# Patient Record
Sex: MALE | Race: OTHER | HISPANIC OR LATINO
[De-identification: names, ages, dates, MRNs, and addresses within clinical notes are randomized per-mention and may not be internally consistent; named-entity substitution may affect disease eponyms.]

---

## 2020-03-18 ENCOUNTER — TRANSCRIPTION ENCOUNTER (OUTPATIENT)
Age: 35
End: 2020-03-18

## 2020-04-07 ENCOUNTER — TRANSCRIPTION ENCOUNTER (OUTPATIENT)
Age: 35
End: 2020-04-07

## 2020-04-09 ENCOUNTER — APPOINTMENT (OUTPATIENT)
Dept: DISASTER EMERGENCY | Facility: CLINIC | Age: 35
End: 2020-04-09

## 2020-04-17 ENCOUNTER — EMERGENCY (EMERGENCY)
Facility: HOSPITAL | Age: 35
LOS: 1 days | Discharge: STILL ADMITTED OR ADMINISTRATIVE | End: 2020-04-17
Attending: EMERGENCY MEDICINE
Payer: COMMERCIAL

## 2020-04-17 VITALS
HEART RATE: 140 BPM | RESPIRATION RATE: 20 BRPM | HEIGHT: 69 IN | SYSTOLIC BLOOD PRESSURE: 116 MMHG | DIASTOLIC BLOOD PRESSURE: 69 MMHG | OXYGEN SATURATION: 100 % | WEIGHT: 212.97 LBS | TEMPERATURE: 99 F

## 2020-04-17 LAB
ABO RH CONFIRMATION: SIGNIFICANT CHANGE UP
ALBUMIN SERPL ELPH-MCNC: 4.3 G/DL — SIGNIFICANT CHANGE UP (ref 3.3–5.2)
ALP SERPL-CCNC: 77 U/L — SIGNIFICANT CHANGE UP (ref 40–120)
ALT FLD-CCNC: 19 U/L — SIGNIFICANT CHANGE UP
ANION GAP SERPL CALC-SCNC: 16 MMOL/L — SIGNIFICANT CHANGE UP (ref 5–17)
APTT BLD: 27.1 SEC — LOW (ref 27.5–36.3)
AST SERPL-CCNC: 9 U/L — SIGNIFICANT CHANGE UP
BASE EXCESS BLDV CALC-SCNC: -2.8 MMOL/L — LOW (ref -2–2)
BASOPHILS # BLD AUTO: 0 K/UL — SIGNIFICANT CHANGE UP (ref 0–0.2)
BASOPHILS NFR BLD AUTO: 0 % — SIGNIFICANT CHANGE UP (ref 0–2)
BILIRUB SERPL-MCNC: 0.5 MG/DL — SIGNIFICANT CHANGE UP (ref 0.4–2)
BLD GP AB SCN SERPL QL: SIGNIFICANT CHANGE UP
BUN SERPL-MCNC: 17 MG/DL — SIGNIFICANT CHANGE UP (ref 8–20)
CA-I SERPL-SCNC: 1.11 MMOL/L — LOW (ref 1.15–1.33)
CALCIUM SERPL-MCNC: 9.3 MG/DL — SIGNIFICANT CHANGE UP (ref 8.6–10.2)
CHLORIDE BLDV-SCNC: 106 MMOL/L — SIGNIFICANT CHANGE UP (ref 98–107)
CHLORIDE SERPL-SCNC: 99 MMOL/L — SIGNIFICANT CHANGE UP (ref 98–107)
CO2 SERPL-SCNC: 21 MMOL/L — LOW (ref 22–29)
CREAT SERPL-MCNC: 0.9 MG/DL — SIGNIFICANT CHANGE UP (ref 0.5–1.3)
D DIMER BLD IA.RAPID-MCNC: <150 NG/ML DDU — SIGNIFICANT CHANGE UP
DIR ANTIGLOB POLYSPECIFIC INTERPRETATION: SIGNIFICANT CHANGE UP
EOSINOPHIL # BLD AUTO: 0.01 K/UL — SIGNIFICANT CHANGE UP (ref 0–0.5)
EOSINOPHIL NFR BLD AUTO: 0.3 % — SIGNIFICANT CHANGE UP (ref 0–6)
ETHANOL SERPL-MCNC: <10 MG/DL — SIGNIFICANT CHANGE UP
FIBRINOGEN PPP-MCNC: 674 MG/DL — HIGH (ref 300–520)
GAS PNL BLDV: 140 MMOL/L — SIGNIFICANT CHANGE UP (ref 135–145)
GAS PNL BLDV: SIGNIFICANT CHANGE UP
GAS PNL BLDV: SIGNIFICANT CHANGE UP
GLUCOSE BLDV-MCNC: 129 MG/DL — HIGH (ref 70–99)
GLUCOSE SERPL-MCNC: 136 MG/DL — HIGH (ref 70–99)
HAPTOGLOB SERPL-MCNC: 386 MG/DL — HIGH (ref 34–200)
HCO3 BLDV-SCNC: 22 MMOL/L — SIGNIFICANT CHANGE UP (ref 21–29)
HCT VFR BLD CALC: 9.9 % — CRITICAL LOW (ref 39–50)
HCT VFR BLDA CALC: 11 — CRITICAL LOW (ref 39–50)
HGB BLD CALC-MCNC: <4.8 G/DL — CRITICAL LOW (ref 13–17)
HGB BLD-MCNC: 3.6 G/DL — CRITICAL LOW (ref 13–17)
IMM GRANULOCYTES NFR BLD AUTO: 0.3 % — SIGNIFICANT CHANGE UP (ref 0–1.5)
INR BLD: 1.19 RATIO — HIGH (ref 0.88–1.16)
LACTATE BLDV-MCNC: 2.9 MMOL/L — HIGH (ref 0.5–2)
LDH SERPL L TO P-CCNC: 100 U/L — SIGNIFICANT CHANGE UP (ref 98–192)
LYMPHOCYTES # BLD AUTO: 2.95 K/UL — SIGNIFICANT CHANGE UP (ref 1–3.3)
LYMPHOCYTES # BLD AUTO: 89.7 % — HIGH (ref 13–44)
MAGNESIUM SERPL-MCNC: 2.3 MG/DL — SIGNIFICANT CHANGE UP (ref 1.6–2.6)
MCHC RBC-ENTMCNC: 32.1 PG — SIGNIFICANT CHANGE UP (ref 27–34)
MCHC RBC-ENTMCNC: 36.4 GM/DL — HIGH (ref 32–36)
MCV RBC AUTO: 88.4 FL — SIGNIFICANT CHANGE UP (ref 80–100)
MONOCYTES # BLD AUTO: 0.08 K/UL — SIGNIFICANT CHANGE UP (ref 0–0.9)
MONOCYTES NFR BLD AUTO: 2.4 % — SIGNIFICANT CHANGE UP (ref 2–14)
NEUTROPHILS # BLD AUTO: 0.24 K/UL — LOW (ref 1.8–7.4)
NEUTROPHILS NFR BLD AUTO: 7.3 % — LOW (ref 43–77)
OB PNL STL: POSITIVE
OTHER CELLS CSF MANUAL: 4 ML/DL — LOW (ref 18–22)
PCO2 BLDV: 36 MMHG — SIGNIFICANT CHANGE UP (ref 35–50)
PH BLDV: 7.39 — SIGNIFICANT CHANGE UP (ref 7.32–7.43)
PLATELET # BLD AUTO: 2 K/UL — CRITICAL LOW (ref 150–400)
PO2 BLDV: 49 MMHG — HIGH (ref 25–45)
POTASSIUM BLDV-SCNC: 3.9 MMOL/L — SIGNIFICANT CHANGE UP (ref 3.4–4.5)
POTASSIUM SERPL-MCNC: 4 MMOL/L — SIGNIFICANT CHANGE UP (ref 3.5–5.3)
POTASSIUM SERPL-SCNC: 4 MMOL/L — SIGNIFICANT CHANGE UP (ref 3.5–5.3)
PROT SERPL-MCNC: 7.4 G/DL — SIGNIFICANT CHANGE UP (ref 6.6–8.7)
PROTHROM AB SERPL-ACNC: 13.5 SEC — HIGH (ref 10–12.9)
RBC # BLD: 1.12 M/UL — LOW (ref 4.2–5.8)
RBC # FLD: 12.9 % — SIGNIFICANT CHANGE UP (ref 10.3–14.5)
SAO2 % BLDV: 84 % — SIGNIFICANT CHANGE UP
SARS-COV-2 RNA SPEC QL NAA+PROBE: SIGNIFICANT CHANGE UP
SODIUM SERPL-SCNC: 136 MMOL/L — SIGNIFICANT CHANGE UP (ref 135–145)
WBC # BLD: 3.29 K/UL — LOW (ref 3.8–10.5)
WBC # FLD AUTO: 3.29 K/UL — LOW (ref 3.8–10.5)

## 2020-04-17 PROCEDURE — 71045 X-RAY EXAM CHEST 1 VIEW: CPT | Mod: 26

## 2020-04-17 PROCEDURE — 99291 CRITICAL CARE FIRST HOUR: CPT

## 2020-04-17 PROCEDURE — 99233 SBSQ HOSP IP/OBS HIGH 50: CPT

## 2020-04-17 PROCEDURE — 93010 ELECTROCARDIOGRAM REPORT: CPT

## 2020-04-17 PROCEDURE — 70450 CT HEAD/BRAIN W/O DYE: CPT | Mod: 26

## 2020-04-17 RX ORDER — VANCOMYCIN HCL 1 G
1000 VIAL (EA) INTRAVENOUS ONCE
Refills: 0 | Status: DISCONTINUED | OUTPATIENT
Start: 2020-04-17 | End: 2020-04-17

## 2020-04-17 RX ORDER — ACETAMINOPHEN 500 MG
650 TABLET ORAL ONCE
Refills: 0 | Status: COMPLETED | OUTPATIENT
Start: 2020-04-17 | End: 2020-04-17

## 2020-04-17 RX ORDER — CEFEPIME 1 G/1
2000 INJECTION, POWDER, FOR SOLUTION INTRAMUSCULAR; INTRAVENOUS ONCE
Refills: 0 | Status: COMPLETED | OUTPATIENT
Start: 2020-04-17 | End: 2020-04-17

## 2020-04-17 RX ADMIN — CEFEPIME 100 MILLIGRAM(S): 1 INJECTION, POWDER, FOR SOLUTION INTRAMUSCULAR; INTRAVENOUS at 19:55

## 2020-04-17 RX ADMIN — Medication 650 MILLIGRAM(S): at 19:55

## 2020-04-17 RX ADMIN — CEFEPIME 2000 MILLIGRAM(S): 1 INJECTION, POWDER, FOR SOLUTION INTRAMUSCULAR; INTRAVENOUS at 20:25

## 2020-04-17 NOTE — ED PROVIDER NOTE - CARE PLAN
Principal Discharge DX:	Anemia  Secondary Diagnosis:	Thrombocytopenia  Secondary Diagnosis:	Leukocytopenia  Secondary Diagnosis:	Gingival bleeding Principal Discharge DX:	Pancytopenia  Secondary Diagnosis:	Gingival bleeding

## 2020-04-17 NOTE — ED ADULT TRIAGE NOTE - CHIEF COMPLAINT QUOTE
Pt with syncopal episode at home, pt reports dizziness x's 10 days, dark stool x's 8 days, fever x's 3 days, bleeding gums x's 7 days, pt pale in color, c/o fatigue and dizziness, MD called to bedside

## 2020-04-17 NOTE — ED PROVIDER NOTE - ATTENDING CONTRIBUTION TO CARE
Agree with resident assessment. Otherwise healthy 34yom w/ pathologic bleeding, labs remarkable for pancytopenia, decreased reticulocyte count, work up so far concerning for acute leukemia, less likely TTP or other hemolytic process. Evaluated by hematology who recommends continued work up at [facility], will need bone marrow biopsy, flow cytometry, etc. Tachycardic but maintaining peripheral perfusion, BP and mental status. Getting 2u PRBC in ED, 1 u platelets. Blood culture sent and empiric antibiotics being given due to neutropenia.

## 2020-04-17 NOTE — ED ADULT NURSE REASSESSMENT NOTE - NS ED NURSE REASSESS COMMENT FT1
blood explained and started, possible reactions explained and pt given call light and instructions to call, RN in room with patient for 10 min and checked again at 15 minutes for vitals, pt stable

## 2020-04-17 NOTE — ED PROVIDER NOTE - NS ED ROS FT
Constitutional: +fever, sweats, and chills, fatigue  Eyes: no pain, no redness, and no visual changes.  ENMT: no ear pain and no hearing problems, no nasal congestion/drainage, no dysphagia, and no throat pain, no neck pain, no stiffness, +bleeding gums  CV: no chest pain, no edema.  Resp: no cough, no dyspnea  GI: no abdominal pain, no bloating, no constipation, no diarrhea, no nausea and no vomiting, +melena  : no dysuria, no hematuria  MSK: no msk pain, no weakness  Skin: no lesions, and no rashes.  Neuro: no LOC, no headache, no sensory deficits, and no weakness.

## 2020-04-17 NOTE — ED ADULT NURSE NOTE - OBJECTIVE STATEMENT
Pt presents with c/o progressively worsening dizziness, tactile fever for the past 8-10 days. + lightheadedness and " not feeling right". skin pale and sl jaundice on exam. denies chest pain or sob.

## 2020-04-17 NOTE — ED ADULT NURSE REASSESSMENT NOTE - NS ED NURSE REASSESS COMMENT FT1
pt doing well, no needs at this time, no signs of transfusion reaction, blood still transfusing, pt aware of wait status

## 2020-04-17 NOTE — ED PROVIDER NOTE - PROGRESS NOTE DETAILS
Willow Campbell, Resident: Pt noted to be anemic, thrombocytopenic, leukocytopenic. pRBCs and platelets ordered. Willow Campbell, Resident: Heme/Onc consulted, spoke to Dr. Schulte who will see the patient. Willow Campbell, Resident: Spoke to Dr. Schulte who recommends transfer to [facility] for further workup by hematology/oncology team including rule out leukemia, MDS etc... Explained reason for transfer to patient and he agrees Brent: Case discussed with Dr. Dent (Heme fellow), [facility] hospitalist, pt to be transferred direct to [address] [place] [address] under hematology service. Will complete 2u PRBC here and then initiate ambulance transfer.

## 2020-04-17 NOTE — ED PROVIDER NOTE - PHYSICAL EXAMINATION
General: FATIGUED, PALLOR OF THE SKIN, NAD  Head:  NC, AT  Eyes: EOMI, PERRLA, no scleral icterus  Ears: no erythema/drainage  Nose: midline, no bleeding/drainage  Throat: MMM  Cardiac: RRR, no m/r/g, no lower extremity edema  Respiratory: CTABL, no wheezes/rales/rhonchi, equal chest wall expansions  Abdomen: soft, ND, NT, no rebound tenderness, no guarding, nonperitonitic  MSK/Vascular: full ROM, distal pulses intact, soft compartments, warm extremities  Neuro: AAOx3, strength 5/5, sensation to light touch intact,  cranial nerves 2-12 intact  Psych: calm, cooperative, normal affect

## 2020-04-17 NOTE — ED PROVIDER NOTE - OBJECTIVE STATEMENT
Pt is a 34 y.o. M PMH asthma presenting with dizziness, dark stools, and fever for 8 days. The pt states he has been progressively more dizzy making it difficult to concentrate. Denies vertigo. +tactile fever, chills. Dark stools, no BRBPR. Denies chest and shortness of breath. +gingival bleeding. Denies abdominal pain, nausea, vomiting, headache.

## 2020-04-17 NOTE — ED ADULT NURSE REASSESSMENT NOTE - NS ED NURSE REASSESS COMMENT FT1
pt updated on plan of care and transfer to [facility] after 2nd unit for higher level of care, questions asked and answered. c/o headache, verbal order for tylenol received.

## 2020-04-17 NOTE — CONSULT NOTE ADULT - SUBJECTIVE AND OBJECTIVE BOX
REASON FOR CONSULTATION: anemia, thrombocytopenia    HPI:  34 year old with pmhx of Asthma who presented with dizziness.   He reports 15 lb weight loss x 3 months, intermittent night sweats. No fevers.  Reports 1 large episode of prolonged epistaxis a few weeks ago.  Denies blood per rectum, melena, hematuria.  Denies any recent infections.  Hgb on admission 3.6, WBC 3, platelets 2000.         REVIEW OF SYSTEMS:  Constitutional, Eyes, ENT, Cardiovascular, Respiratory, Gastrointestinal, Genitourinary, Musculoskeletal, Integumentary, Neurological, Psychiatric, Endocrine, Heme/Lymph, and Allergic/Immunologic review of systems are otherwise negative except as noted in the HPI.    PAST MEDICAL & SURGICAL HISTORY:  Redundant prepuce and phimosis  Asthma, stable  No significant past surgical history      FAMILY HISTORY:  No pertinent family history in first degree relatives      SOCIAL HISTORY:  Denies  smoking.     Allergies    No Known Allergies    Intolerances        MEDICATIONS  (STANDING):  cefepime   IVPB 2000 milliGRAM(s) IV Intermittent once    MEDICATIONS  (PRN):      Vital Signs Last 24 Hrs  T(C): 37.2 (17 Apr 2020 18:47), Max: 37.7 (17 Apr 2020 14:28)  T(F): 98.9 (17 Apr 2020 18:47), Max: 99.8 (17 Apr 2020 14:28)  HR: 128 (17 Apr 2020 18:47) (128 - 140)  BP: 120/69 (17 Apr 2020 18:47) (116/69 - 120/69)  BP(mean): --  RR: 20 (17 Apr 2020 18:47) (20 - 20)  SpO2: 98% (17 Apr 2020 18:47) (98% - 100%)    PHYSICAL EXAM:    GENERAL: pale appearing gentleman, in no acute distress  HEAD:  Atraumatic, Normocephalic  EYES: pale conjunctivae  NECK: Supple, No JVD, Normal thyroid  NERVOUS SYSTEM:  alert awake, slow to respond  CHEST/LUNG: Clear to auscultation bilaterally;   HEART: Regular rate and rhythm;   ABDOMEN: Soft, Nontender, Nondistended;  EXTREMITIES: No edema  LYMPH: No lymphadenopathy noted  SKIN: No rashes       LABS:                        3.6    3.29  )-----------( 2        ( 17 Apr 2020 14:39 )             9.9      04-17    136  |  99  |  17.0  ----------------------------<  136<H>  4.0   |  21.0<L>  |  0.90    Ca    9.3      17 Apr 2020 14:39  Phos  3.0     04-17  Mg     2.3     04-17    TPro  7.4  /  Alb  4.3  /  TBili  0.5  /  DBili  x   /  AST  9   /  ALT  19  /  AlkPhos  77  04-17    PT/INR - ( 17 Apr 2020 14:39 )   PT: 13.5 sec;   INR: 1.19 ratio         PTT - ( 17 Apr 2020 14:39 )  PTT:27.1 sec        RADIOLOGY & ADDITIONAL STUDIES:  CXR - clear

## 2020-04-17 NOTE — CONSULT NOTE ADULT - ASSESSMENT
34 year old gentleman presenting with dizziness, noted to have pancytopenia, severe anemia and severe thrombocytopenia.  Hgb 3.6, platelets 2K, WBC 3, primarily lymphocytosis.  Mildly elevated PT/INR, Normal ddmier  +gingival bleeding, +B symptoms  Peripheral smear with a few large immature cells, essentially no platelets.     Plan:  Transfuse 1 unit platelets and 2 units prbc  R/o acute leukemia, transfer to [facility]  Discussed with ED attending and heme fellow at [facility]

## 2020-04-18 ENCOUNTER — INPATIENT (INPATIENT)
Facility: HOSPITAL | Age: 35
LOS: 31 days | Discharge: HOME | DRG: 810 | End: 2020-05-20
Attending: INTERNAL MEDICINE
Payer: COMMERCIAL

## 2020-04-18 VITALS
SYSTOLIC BLOOD PRESSURE: 145 MMHG | RESPIRATION RATE: 16 BRPM | DIASTOLIC BLOOD PRESSURE: 85 MMHG | TEMPERATURE: 98 F | HEIGHT: 69 IN | WEIGHT: 215.39 LBS | OXYGEN SATURATION: 99 % | HEART RATE: 98 BPM

## 2020-04-18 VITALS
SYSTOLIC BLOOD PRESSURE: 105 MMHG | HEART RATE: 99 BPM | TEMPERATURE: 98 F | DIASTOLIC BLOOD PRESSURE: 55 MMHG | RESPIRATION RATE: 17 BRPM | OXYGEN SATURATION: 100 %

## 2020-04-18 DIAGNOSIS — D69.6 THROMBOCYTOPENIA, UNSPECIFIED: ICD-10-CM

## 2020-04-18 DIAGNOSIS — Z98.890 OTHER SPECIFIED POSTPROCEDURAL STATES: Chronic | ICD-10-CM

## 2020-04-18 DIAGNOSIS — D50.0 IRON DEFICIENCY ANEMIA SECONDARY TO BLOOD LOSS (CHRONIC): ICD-10-CM

## 2020-04-18 DIAGNOSIS — H53.9 UNSPECIFIED VISUAL DISTURBANCE: ICD-10-CM

## 2020-04-18 DIAGNOSIS — C95.90 LEUKEMIA, UNSPECIFIED NOT HAVING ACHIEVED REMISSION: ICD-10-CM

## 2020-04-18 LAB
ALBUMIN SERPL ELPH-MCNC: 3.9 G/DL — SIGNIFICANT CHANGE UP (ref 3.3–5)
ALBUMIN SERPL ELPH-MCNC: 4.3 G/DL — SIGNIFICANT CHANGE UP (ref 3.3–5)
ALP SERPL-CCNC: 62 U/L — SIGNIFICANT CHANGE UP (ref 40–120)
ALP SERPL-CCNC: 75 U/L — SIGNIFICANT CHANGE UP (ref 40–120)
ALT FLD-CCNC: 18 U/L — SIGNIFICANT CHANGE UP (ref 10–45)
ALT FLD-CCNC: 21 U/L — SIGNIFICANT CHANGE UP (ref 10–45)
ANION GAP SERPL CALC-SCNC: 13 MMOL/L — SIGNIFICANT CHANGE UP (ref 5–17)
ANION GAP SERPL CALC-SCNC: 9 MMOL/L — SIGNIFICANT CHANGE UP (ref 5–17)
APPEARANCE UR: CLEAR — SIGNIFICANT CHANGE UP
APTT BLD: 25.9 SEC — LOW (ref 27.5–36.3)
APTT BLD: 27.5 SEC — SIGNIFICANT CHANGE UP (ref 27.5–36.3)
AST SERPL-CCNC: 12 U/L — SIGNIFICANT CHANGE UP (ref 10–40)
AST SERPL-CCNC: 9 U/L — LOW (ref 10–40)
BASE EXCESS BLDV CALC-SCNC: -0.6 MMOL/L — SIGNIFICANT CHANGE UP (ref -2–2)
BASOPHILS # BLD AUTO: 0 K/UL — SIGNIFICANT CHANGE UP (ref 0–0.2)
BASOPHILS NFR BLD AUTO: 0 % — SIGNIFICANT CHANGE UP (ref 0–2)
BILIRUB SERPL-MCNC: 1 MG/DL — SIGNIFICANT CHANGE UP (ref 0.2–1.2)
BILIRUB SERPL-MCNC: 1.1 MG/DL — SIGNIFICANT CHANGE UP (ref 0.2–1.2)
BILIRUB UR-MCNC: NEGATIVE — SIGNIFICANT CHANGE UP
BLD GP AB SCN SERPL QL: NEGATIVE — SIGNIFICANT CHANGE UP
BUN SERPL-MCNC: 15 MG/DL — SIGNIFICANT CHANGE UP (ref 7–23)
BUN SERPL-MCNC: 16 MG/DL — SIGNIFICANT CHANGE UP (ref 7–23)
CA-I SERPL-SCNC: 1.19 MMOL/L — SIGNIFICANT CHANGE UP (ref 1.12–1.3)
CALCIUM SERPL-MCNC: 8.7 MG/DL — SIGNIFICANT CHANGE UP (ref 8.4–10.5)
CALCIUM SERPL-MCNC: 9.2 MG/DL — SIGNIFICANT CHANGE UP (ref 8.4–10.5)
CHLORIDE BLDV-SCNC: 106 MMOL/L — SIGNIFICANT CHANGE UP (ref 96–108)
CHLORIDE SERPL-SCNC: 104 MMOL/L — SIGNIFICANT CHANGE UP (ref 96–108)
CHLORIDE SERPL-SCNC: 104 MMOL/L — SIGNIFICANT CHANGE UP (ref 96–108)
CK MB CFR SERPL CALC: 1 NG/ML — SIGNIFICANT CHANGE UP (ref 0–6.7)
CK SERPL-CCNC: 86 U/L — SIGNIFICANT CHANGE UP (ref 30–200)
CO2 BLDV-SCNC: 25 MMOL/L — SIGNIFICANT CHANGE UP (ref 22–30)
CO2 SERPL-SCNC: 23 MMOL/L — SIGNIFICANT CHANGE UP (ref 22–31)
CO2 SERPL-SCNC: 23 MMOL/L — SIGNIFICANT CHANGE UP (ref 22–31)
COLOR SPEC: YELLOW — SIGNIFICANT CHANGE UP
CREAT SERPL-MCNC: 0.77 MG/DL — SIGNIFICANT CHANGE UP (ref 0.5–1.3)
CREAT SERPL-MCNC: 0.79 MG/DL — SIGNIFICANT CHANGE UP (ref 0.5–1.3)
D DIMER BLD IA.RAPID-MCNC: 193 NG/ML DDU — SIGNIFICANT CHANGE UP
D DIMER BLD IA.RAPID-MCNC: 230 NG/ML DDU — HIGH
DIFF PNL FLD: NEGATIVE — SIGNIFICANT CHANGE UP
EOSINOPHIL # BLD AUTO: 0 K/UL — SIGNIFICANT CHANGE UP (ref 0–0.5)
EOSINOPHIL NFR BLD AUTO: 0 % — SIGNIFICANT CHANGE UP (ref 0–6)
FIBRINOGEN PPP-MCNC: 614 MG/DL — HIGH (ref 350–510)
GAS PNL BLDV: 137 MMOL/L — SIGNIFICANT CHANGE UP (ref 135–145)
GAS PNL BLDV: SIGNIFICANT CHANGE UP
GAS PNL BLDV: SIGNIFICANT CHANGE UP
GLUCOSE BLDV-MCNC: 97 MG/DL — SIGNIFICANT CHANGE UP (ref 70–99)
GLUCOSE SERPL-MCNC: 109 MG/DL — HIGH (ref 70–99)
GLUCOSE SERPL-MCNC: 97 MG/DL — SIGNIFICANT CHANGE UP (ref 70–99)
GLUCOSE UR QL: NEGATIVE MG/DL — SIGNIFICANT CHANGE UP
HAV IGM SER-ACNC: SIGNIFICANT CHANGE UP
HBV CORE AB SER-ACNC: SIGNIFICANT CHANGE UP
HBV CORE IGM SER-ACNC: SIGNIFICANT CHANGE UP
HBV SURFACE AB SER-ACNC: REACTIVE
HBV SURFACE AG SER-ACNC: SIGNIFICANT CHANGE UP
HCO3 BLDV-SCNC: 24 MMOL/L — SIGNIFICANT CHANGE UP (ref 21–29)
HCT VFR BLD CALC: 11.9 % — CRITICAL LOW (ref 39–50)
HCT VFR BLD CALC: 14.8 % — CRITICAL LOW (ref 39–50)
HCT VFR BLDA CALC: 14 % — CRITICAL LOW (ref 39–50)
HCV AB S/CO SERPL IA: 0.09 S/CO — SIGNIFICANT CHANGE UP (ref 0–0.99)
HCV AB SERPL-IMP: SIGNIFICANT CHANGE UP
HGB BLD CALC-MCNC: 4.2 G/DL — CRITICAL LOW (ref 13–17)
HGB BLD-MCNC: 4.2 G/DL — CRITICAL LOW (ref 13–17)
HGB BLD-MCNC: 5.3 G/DL — CRITICAL LOW (ref 13–17)
HIV 1+2 AB+HIV1 P24 AG SERPL QL IA: SIGNIFICANT CHANGE UP
INR BLD: 1.1 RATIO — SIGNIFICANT CHANGE UP (ref 0.88–1.16)
INR BLD: 1.17 RATIO — HIGH (ref 0.88–1.16)
KETONES UR-MCNC: NEGATIVE — SIGNIFICANT CHANGE UP
LACTATE BLDV-MCNC: 0.8 MMOL/L — SIGNIFICANT CHANGE UP (ref 0.7–2)
LDH SERPL L TO P-CCNC: 102 U/L — SIGNIFICANT CHANGE UP (ref 50–242)
LDH SERPL L TO P-CCNC: 109 U/L — SIGNIFICANT CHANGE UP (ref 50–242)
LEUKOCYTE ESTERASE UR-ACNC: NEGATIVE — SIGNIFICANT CHANGE UP
LYMPHOCYTES # BLD AUTO: 1.77 K/UL — SIGNIFICANT CHANGE UP (ref 1–3.3)
LYMPHOCYTES # BLD AUTO: 94.4 % — HIGH (ref 13–44)
MAGNESIUM SERPL-MCNC: 2.3 MG/DL — SIGNIFICANT CHANGE UP (ref 1.6–2.6)
MAGNESIUM SERPL-MCNC: 2.4 MG/DL — SIGNIFICANT CHANGE UP (ref 1.6–2.6)
MANUAL SMEAR VERIFICATION: SIGNIFICANT CHANGE UP
MCHC RBC-ENTMCNC: 30.8 PG — SIGNIFICANT CHANGE UP (ref 27–34)
MCHC RBC-ENTMCNC: 31.1 PG — SIGNIFICANT CHANGE UP (ref 27–34)
MCHC RBC-ENTMCNC: 35.3 GM/DL — SIGNIFICANT CHANGE UP (ref 32–36)
MCHC RBC-ENTMCNC: 35.8 GM/DL — SIGNIFICANT CHANGE UP (ref 32–36)
MCV RBC AUTO: 86 FL — SIGNIFICANT CHANGE UP (ref 80–100)
MCV RBC AUTO: 88.1 FL — SIGNIFICANT CHANGE UP (ref 80–100)
MONOCYTES # BLD AUTO: 0.04 K/UL — SIGNIFICANT CHANGE UP (ref 0–0.9)
MONOCYTES NFR BLD AUTO: 1.9 % — LOW (ref 2–14)
NEUTROPHILS # BLD AUTO: 0.07 K/UL — LOW (ref 1.8–7.4)
NEUTROPHILS NFR BLD AUTO: 3.7 % — LOW (ref 43–77)
NITRITE UR-MCNC: NEGATIVE — SIGNIFICANT CHANGE UP
NRBC # BLD: 0 /100 WBCS — SIGNIFICANT CHANGE UP (ref 0–0)
OTHER CELLS CSF MANUAL: 6 ML/DL — LOW (ref 18–22)
PCO2 BLDV: 38 MMHG — SIGNIFICANT CHANGE UP (ref 35–50)
PH BLDV: 7.4 — SIGNIFICANT CHANGE UP (ref 7.35–7.45)
PH UR: 7 — SIGNIFICANT CHANGE UP (ref 5–8)
PHOSPHATE SERPL-MCNC: 3.1 MG/DL — SIGNIFICANT CHANGE UP (ref 2.5–4.5)
PHOSPHATE SERPL-MCNC: 3.6 MG/DL — SIGNIFICANT CHANGE UP (ref 2.5–4.5)
PLAT MORPH BLD: NORMAL — SIGNIFICANT CHANGE UP
PLATELET # BLD AUTO: 43 K/UL — LOW (ref 150–400)
PLATELET # BLD AUTO: 45 K/UL — LOW (ref 150–400)
PO2 BLDV: 84 MMHG — HIGH (ref 25–45)
POTASSIUM BLDV-SCNC: 4.1 MMOL/L — SIGNIFICANT CHANGE UP (ref 3.5–5.3)
POTASSIUM SERPL-MCNC: 4.1 MMOL/L — SIGNIFICANT CHANGE UP (ref 3.5–5.3)
POTASSIUM SERPL-MCNC: 4.1 MMOL/L — SIGNIFICANT CHANGE UP (ref 3.5–5.3)
POTASSIUM SERPL-SCNC: 4.1 MMOL/L — SIGNIFICANT CHANGE UP (ref 3.5–5.3)
POTASSIUM SERPL-SCNC: 4.1 MMOL/L — SIGNIFICANT CHANGE UP (ref 3.5–5.3)
PROT SERPL-MCNC: 6.5 G/DL — SIGNIFICANT CHANGE UP (ref 6–8.3)
PROT SERPL-MCNC: 7.4 G/DL — SIGNIFICANT CHANGE UP (ref 6–8.3)
PROT UR-MCNC: NEGATIVE MG/DL — SIGNIFICANT CHANGE UP
PROTHROM AB SERPL-ACNC: 12.7 SEC — SIGNIFICANT CHANGE UP (ref 10–12.9)
PROTHROM AB SERPL-ACNC: 13.4 SEC — HIGH (ref 10–12.9)
RBC # BLD: 1.35 M/UL — LOW (ref 4.2–5.8)
RBC # BLD: 1.72 M/UL — LOW (ref 4.2–5.8)
RBC # FLD: 12.8 % — SIGNIFICANT CHANGE UP (ref 10.3–14.5)
RBC # FLD: 12.9 % — SIGNIFICANT CHANGE UP (ref 10.3–14.5)
RBC BLD AUTO: SIGNIFICANT CHANGE UP
RH IG SCN BLD-IMP: POSITIVE — SIGNIFICANT CHANGE UP
RH IG SCN BLD-IMP: POSITIVE — SIGNIFICANT CHANGE UP
SAO2 % BLDV: 98 % — HIGH (ref 67–88)
SODIUM SERPL-SCNC: 136 MMOL/L — SIGNIFICANT CHANGE UP (ref 135–145)
SODIUM SERPL-SCNC: 140 MMOL/L — SIGNIFICANT CHANGE UP (ref 135–145)
SP GR SPEC: 1.01 — SIGNIFICANT CHANGE UP (ref 1.01–1.02)
TROPONIN T, HIGH SENSITIVITY RESULT: 8 NG/L — SIGNIFICANT CHANGE UP (ref 0–51)
URATE SERPL-MCNC: 2.9 MG/DL — LOW (ref 3.4–8.8)
URATE SERPL-MCNC: 3.6 MG/DL — SIGNIFICANT CHANGE UP (ref 3.4–8.8)
UROBILINOGEN FLD QL: NEGATIVE MG/DL — SIGNIFICANT CHANGE UP
WBC # BLD: 1.64 K/UL — LOW (ref 3.8–10.5)
WBC # BLD: 1.87 K/UL — LOW (ref 3.8–10.5)
WBC # FLD AUTO: 1.64 K/UL — LOW (ref 3.8–10.5)
WBC # FLD AUTO: 1.87 K/UL — LOW (ref 3.8–10.5)

## 2020-04-18 PROCEDURE — 93005 ELECTROCARDIOGRAM TRACING: CPT

## 2020-04-18 PROCEDURE — 70450 CT HEAD/BRAIN W/O DYE: CPT

## 2020-04-18 PROCEDURE — 71045 X-RAY EXAM CHEST 1 VIEW: CPT

## 2020-04-18 PROCEDURE — 86850 RBC ANTIBODY SCREEN: CPT

## 2020-04-18 PROCEDURE — 85027 COMPLETE CBC AUTOMATED: CPT

## 2020-04-18 PROCEDURE — 99232 SBSQ HOSP IP/OBS MODERATE 35: CPT | Mod: GC

## 2020-04-18 PROCEDURE — 82330 ASSAY OF CALCIUM: CPT

## 2020-04-18 PROCEDURE — 85384 FIBRINOGEN ACTIVITY: CPT

## 2020-04-18 PROCEDURE — 86900 BLOOD TYPING SEROLOGIC ABO: CPT

## 2020-04-18 PROCEDURE — 80053 COMPREHEN METABOLIC PANEL: CPT

## 2020-04-18 PROCEDURE — 83010 ASSAY OF HAPTOGLOBIN QUANT: CPT

## 2020-04-18 PROCEDURE — 80307 DRUG TEST PRSMV CHEM ANLYZR: CPT

## 2020-04-18 PROCEDURE — 86880 COOMBS TEST DIRECT: CPT

## 2020-04-18 PROCEDURE — 82272 OCCULT BLD FECES 1-3 TESTS: CPT

## 2020-04-18 PROCEDURE — 99291 CRITICAL CARE FIRST HOUR: CPT | Mod: 25

## 2020-04-18 PROCEDURE — 85014 HEMATOCRIT: CPT

## 2020-04-18 PROCEDURE — 84132 ASSAY OF SERUM POTASSIUM: CPT

## 2020-04-18 PROCEDURE — P9037: CPT

## 2020-04-18 PROCEDURE — 85379 FIBRIN DEGRADATION QUANT: CPT

## 2020-04-18 PROCEDURE — 82435 ASSAY OF BLOOD CHLORIDE: CPT

## 2020-04-18 PROCEDURE — 85610 PROTHROMBIN TIME: CPT

## 2020-04-18 PROCEDURE — 82962 GLUCOSE BLOOD TEST: CPT

## 2020-04-18 PROCEDURE — 85045 AUTOMATED RETICULOCYTE COUNT: CPT

## 2020-04-18 PROCEDURE — 83615 LACTATE (LD) (LDH) ENZYME: CPT

## 2020-04-18 PROCEDURE — G0452: CPT | Mod: 26

## 2020-04-18 PROCEDURE — 82947 ASSAY GLUCOSE BLOOD QUANT: CPT

## 2020-04-18 PROCEDURE — 86923 COMPATIBILITY TEST ELECTRIC: CPT

## 2020-04-18 PROCEDURE — 84295 ASSAY OF SERUM SODIUM: CPT

## 2020-04-18 PROCEDURE — 99223 1ST HOSP IP/OBS HIGH 75: CPT

## 2020-04-18 PROCEDURE — 87635 SARS-COV-2 COVID-19 AMP PRB: CPT

## 2020-04-18 PROCEDURE — 83605 ASSAY OF LACTIC ACID: CPT

## 2020-04-18 PROCEDURE — P9016: CPT

## 2020-04-18 PROCEDURE — 88189 FLOWCYTOMETRY/READ 16 & >: CPT

## 2020-04-18 PROCEDURE — 96365 THER/PROPH/DIAG IV INF INIT: CPT | Mod: XU

## 2020-04-18 PROCEDURE — 86901 BLOOD TYPING SEROLOGIC RH(D): CPT

## 2020-04-18 PROCEDURE — 85730 THROMBOPLASTIN TIME PARTIAL: CPT

## 2020-04-18 PROCEDURE — 82803 BLOOD GASES ANY COMBINATION: CPT

## 2020-04-18 PROCEDURE — 93010 ELECTROCARDIOGRAM REPORT: CPT

## 2020-04-18 PROCEDURE — 84100 ASSAY OF PHOSPHORUS: CPT

## 2020-04-18 PROCEDURE — 36430 TRANSFUSION BLD/BLD COMPNT: CPT

## 2020-04-18 PROCEDURE — 81003 URINALYSIS AUTO W/O SCOPE: CPT

## 2020-04-18 PROCEDURE — 84550 ASSAY OF BLOOD/URIC ACID: CPT

## 2020-04-18 PROCEDURE — 36415 COLL VENOUS BLD VENIPUNCTURE: CPT

## 2020-04-18 PROCEDURE — 83735 ASSAY OF MAGNESIUM: CPT

## 2020-04-18 RX ORDER — SODIUM CHLORIDE 9 MG/ML
1000 INJECTION INTRAMUSCULAR; INTRAVENOUS; SUBCUTANEOUS
Refills: 0 | Status: DISCONTINUED | OUTPATIENT
Start: 2020-04-18 | End: 2020-05-15

## 2020-04-18 RX ORDER — TRETINOIN 10 MG/1
50 CAPSULE, LIQUID FILLED ORAL EVERY 12 HOURS
Refills: 0 | Status: DISCONTINUED | OUTPATIENT
Start: 2020-04-18 | End: 2020-04-18

## 2020-04-18 RX ORDER — ACETAMINOPHEN 500 MG
650 TABLET ORAL EVERY 6 HOURS
Refills: 0 | Status: DISCONTINUED | OUTPATIENT
Start: 2020-04-18 | End: 2020-05-11

## 2020-04-18 RX ORDER — SALIVA SUBSTITUTE COMB NO.11 351 MG
5 POWDER IN PACKET (EA) MUCOUS MEMBRANE
Refills: 0 | Status: DISCONTINUED | OUTPATIENT
Start: 2020-04-18 | End: 2020-05-20

## 2020-04-18 RX ORDER — ONDANSETRON 8 MG/1
4 TABLET, FILM COATED ORAL EVERY 6 HOURS
Refills: 0 | Status: DISCONTINUED | OUTPATIENT
Start: 2020-04-18 | End: 2020-05-20

## 2020-04-18 RX ORDER — ALLOPURINOL 300 MG
300 TABLET ORAL DAILY
Refills: 0 | Status: DISCONTINUED | OUTPATIENT
Start: 2020-04-18 | End: 2020-04-24

## 2020-04-18 RX ADMIN — Medication 5 MILLILITER(S): at 12:12

## 2020-04-18 RX ADMIN — SODIUM CHLORIDE 75 MILLILITER(S): 9 INJECTION INTRAMUSCULAR; INTRAVENOUS; SUBCUTANEOUS at 14:00

## 2020-04-18 RX ADMIN — Medication 5 MILLILITER(S): at 22:54

## 2020-04-18 RX ADMIN — SODIUM CHLORIDE 75 MILLILITER(S): 9 INJECTION INTRAMUSCULAR; INTRAVENOUS; SUBCUTANEOUS at 22:55

## 2020-04-18 RX ADMIN — Medication 300 MILLIGRAM(S): at 14:35

## 2020-04-18 RX ADMIN — Medication 650 MILLIGRAM(S): at 22:30

## 2020-04-18 NOTE — H&P ADULT - PROBLEM SELECTOR PLAN 4
pt endorsing some difficulty seeing from R eye; CTH at OSH noted and reviewed, no infarcts or bleeding, orbits appear normal   given severe thrombocytopenia, would obtain ophtho consult for further eval

## 2020-04-18 NOTE — ED ADULT NURSE REASSESSMENT NOTE - NS ED NURSE REASSESS COMMENT FT1
PRBCs finished, vitals taken, platelets started, RN calling transfer center and will tx to heme/onc unit at [facility]

## 2020-04-18 NOTE — CONSULT NOTE ADULT - ATTENDING COMMENTS
34M with no PMH presented to OSH with dizziness x 2 weeks, easy bruising x 2-4 weeks. At home had a syncopal event and called 911, found to have hemoglobin 3s, Platelets 2K, no evidence of DIC. Noted mild petechiae and has had gum bleeding. These have improved. Noted dark stools for 2 weeks. Peripheral smear with immature forms concerning for acute leukemia. Flow and FISH pending.   -f/u flow cytometry, FISH results  -will continue ATRA 50 mg BID until PML-CHANTELLE negative   -CBC, TLS, DIC labs q 12h   -start allopurinol   -transfuse hb< 7, Plt <50k until PML-CHANTELLE negative   -monitor for s/s of bleeding   -will need bone marrow biopsy on Monday

## 2020-04-18 NOTE — CHART NOTE - NSCHARTNOTEFT_GEN_A_CORE
Pt is a 34 y.o. M PMH asthma presenting with 8 days hx of dizziness, gingival bleeding, dark stools, and fever for 8 days. The pt states he has been progressively more dizzy making it difficult to concentrate.  Denies abdominal pain, nausea, vomiting, headache. CBC at [facility] showed pancytopenia with. Pt was seen by Dr. Pierre (aba) at [facility] who noted a few immature cells concerning for acute leukemia. Transferred to [facility] further management. Pt received PRBC x2 and plt x1 at [facility]. Resquested to give a dose of ATRA at [facility].    # Pancytopenia  - Wbc 3.29, lymphocyte predominant, Hgb 3.6, plt 2. Large, immature  cells were noted at [facility] per Dr. Pierre. Will order PBS to review.  - likely acute leukemia (ALL?)  - Pt has mild elevation of PT, normal D-dimer, high fibrinogen and does not appear to be in DIC.  - requested a dose of ATRA to be given at [facility] as pt has gingival bleeding, dark stool which can be seen in APL   - Hemolysis is unlikely with LDH of 139, high haptoglobin and normal bili  - pt received 2 units of PRBC and 1 unit of plt at [facility]  - please check TLS labs daily (uric acid, LDH, phosph).  Replete calcium as needed  - please check daily coags,fibrinogen and d-dimer  - needs bone marrow biopsy on Monday  - will need MUGA and line placement in anticipation of chemotherapy   - monitor labs q12h for now   - transfuse to keep Hg>7 and plts >10 or 15 if febrile    Case discussed with Dr. Atif Larson Chi, MD. PGY 5  Heme-Onc fellow  [phone]; [id number]

## 2020-04-18 NOTE — H&P ADULT - PROBLEM SELECTOR PLAN 2
pt with Hb 3.6 on arrival to OSH - likely 2/2 leukemia and GI bleeding with +FOBT  s/p 2u PRBC at OSH  will recheck CBC stat, transfuse for Hb <7  normal d-dimer and high fibrinogen, less likely DIC  normal LDH and hapto, unlikely hemolysis   check labs q12h for now

## 2020-04-18 NOTE — H&P ADULT - PROBLEM SELECTOR PLAN 1
given pancytopenia with B symptoms, c/f acute leukemic process   hematology at OSH noted few large immature cells  will check peripheral blood smear   check TLS labs daily   check daily coags,fibrinogen and d-dimer  plan for BM bx on Monday  will order MUGA; will need port in prep for chemo

## 2020-04-18 NOTE — H&P ADULT - NSHPLABSRESULTS_GEN_ALL_CORE
Labs, imaging personally reviewed and interpreted by me                           3.6    3.29  )-----------( 2        ( 17 Apr 2020 14:39 )             9.9      04-17    136  |  99  |  17.0  ----------------------------<  136<H>  4.0   |  21.0<L>  |  0.90    Ca    9.3      17 Apr 2020 14:39  Phos  3.0     04-17  Mg     2.3     04-17    TPro  7.4  /  Alb  4.3  /  TBili  0.5  /  DBili  x   /  AST  9   /  ALT  19  /  AlkPhos  77  04-17        PT/INR - ( 17 Apr 2020 14:39 )   PT: 13.5 sec;   INR: 1.19 ratio    PTT - ( 17 Apr 2020 14:39 )  PTT:27.1 sec    < from: CT Head No Cont (04.17.20 @ 16:11) >  There is no acute intracranial hemorrhage or mass effect. The ventricles and sulci are normal in size for patient's age.   There is no extraaxial fluid collection.   There is no displaced calvarial fracture. The visualized orbits are within normal limits. Opacified bilateral frontal sinuses with hyperdense material suggesting inspissated secretions versus fungal sinusitis. Partially opacifiedbilateral ethmoid sinuses. Left maxillary sinus mucosal thickening. The mastoid air cells are well aerated.  IMPRESSION:   1.  No acute intracranial hemorrhage or mass effect.   2.  Paranasal sinusitis.      < from: Xray Chest 1 View- PORTABLE-Urgent (04.17.20 @ 15:19) >  IMPRESSION: Normal chest

## 2020-04-18 NOTE — H&P ADULT - NSHPPHYSICALEXAM_GEN_ALL_CORE
Vital Signs Last 24 Hrs  T(C): 36.6 (18 Apr 2020 03:26), Max: 37.7 (17 Apr 2020 14:28)  T(F): 97.9 (18 Apr 2020 03:26), Max: 99.8 (17 Apr 2020 14:28)  HR: 98 (18 Apr 2020 03:26) (98 - 140)  BP: 145/85 (18 Apr 2020 03:26) (105/55 - 145/85)  BP(mean): --  RR: 16 (18 Apr 2020 03:26) (16 - 20)  SpO2: 99% (18 Apr 2020 03:26) (97% - 100%)    PHYSICAL EXAM:  GENERAL: NAD, well-developed  HEAD:  Atraumatic, normocephalic  EYES: EOMI, pale conjunctiva, sclera clear   NECK: Supple, no JVD  CHEST/LUNG: Clear to auscultation bilaterally; no wheezing or rales  HEART: Regular rate and rhythm; no murmurs  ABDOMEN: Soft, nontender, nondistended; bowel sounds present  EXTREMITIES:  2+ Peripheral Pulses, no edema  PSYCH: calm affect, not anxious  NEUROLOGY: non-focal, AAOx3  SKIN: No rashes or lesions, no ecchymoses/bruising noted   MUSCULOSKELETAL: no back pain, moving all extremities

## 2020-04-18 NOTE — H&P ADULT - ASSESSMENT
34M with no PMH p/w dizziness in setting of 2 weeks of fevers/chills, melena and easy bruising, found to be pancytopenic with Hb 3.6 and platelets of 2, c/f acute leukemic process - s/p PRBC/platelets transfusions and now transferred to [facility] for further work-up.

## 2020-04-18 NOTE — H&P ADULT - NSHPREVIEWOFSYSTEMS_GEN_ALL_CORE
REVIEW OF SYSTEMS:    CONSTITUTIONAL: +weakness, +fevers, +chills  EYES/ENT: +visual changes;  no throat pain   NECK: No pain or stiffness  RESPIRATORY: No cough, +shortness of breath  CARDIOVASCULAR: No chest pain or palpitations  GASTROINTESTINAL: +nausea, no vomiting, no abdominal pain, +melena   GENITOURINARY: no hematuria, no dysuria  NEUROLOGICAL: no numbness, +headaches, no confusion   MUSCULOSKELETAL: no back pain, no focal weakness   SKIN: No rashes, or lesions   PSYCH: no anxiety, depression  HEME: +gum bleeding, +bruising

## 2020-04-18 NOTE — H&P ADULT - PROBLEM SELECTOR PLAN 3
pt with platelets of 2 at OSH; s/p 1u platelets   recheck CBC stat   transfuse for platelets <10 or <15 if febrile   check labs q12h for now

## 2020-04-18 NOTE — CONSULT NOTE ADULT - ASSESSMENT
34M with no PMH p/w dizziness in setting of 2 weeks of fevers/chills, melena and easy bruising, found to be pancytopenic with Hb 3.6 and platelets of 2, c/f acute leukemic process - s/p PRBC/platelets transfusions and now transferred to [facility] for further work-up.     # Pancytopenia  - Wbc 3.29, lymphocyte predominant, Hgb 3.6, plt 2. Large, immature  cells were noted at [facility] per Dr. Pierre. Will order PBS to review.  - likely acute leukemia (ALL?)  - Pt has mild elevation of PT, normal D-dimer, high fibrinogen and does not appear to be in DIC.  - requested a dose of ATRA to be given at [facility] as pt has gingival bleeding, dark stool which can be seen in APL   - Hemolysis is unlikely with LDH of 139, high haptoglobin and normal bili  - pt received 2 units of PRBC and 1 unit of plt at H  - please check TLS labs daily (uric acid, LDH, phosph).  Replete calcium as needed  - please check daily coags,fibrinogen and d-dimer  - needs bone marrow biopsy on Monday  - will need MUGA and line placement in anticipation of chemotherapy   - monitor labs q12h for now   - transfuse to keep Hg>7 and plts >10 or 15 if febrile        Case discussed with Dr. Atif Larson Chi, MD. PGY 5  Heme-Onc fellow  [phone]; [id number]. 34M with no PMH p/w dizziness in setting of 2 weeks of fevers/chills, melena and easy bruising, found to be pancytopenic with Hb 3.6 and platelets of 2, c/f acute leukemic process - s/p PRBC/platelets transfusions and now transferred to [facility] for further work-up.     # Pancytopenia  - Wbc 3.29, lymphocyte predominant, Hgb 3.6, plt 2. Large, immature  cells were noted at [facility] per Dr. Pierre.  - PBS reviewed (4/18)- no blasts were seen.   - likely acute leukemia (ALL?)  - Pt has mild elevation of PT, normal D-dimer, high fibrinogen and does not appear to be in DIC.  - requested a dose of ATRA to be given at [facility] as pt has gingival bleeding, dark stool which can be seen in APL   - Hemolysis is unlikely with LDH of 139, high haptoglobin and normal bili  - pt received 2 units of PRBC and 1 unit of plt at [facility]  - please check TLS labs daily (uric acid, LDH, phosph).  Replete calcium as needed  - please check daily coags,fibrinogen and d-dimer  - needs bone marrow biopsy on Monday  - will need MUGA and line placement in anticipation of chemotherapy   - monitor labs q12h for now   - transfuse to keep Hg>7 and plts >10 or 15 if febrile  - flowcytometry ordered- requisition form left with RN. Spoke to staff in flowcytometry for expedited processing        Case discussed with Dr. Atif Larson Chi, MD. PGY 5  Heme-Onc fellow  [phone]; [id number]. 34M with no PMH p/w dizziness in setting of 2 weeks of fevers/chills, melena and easy bruising, found to be pancytopenic with Hb 3.6 and platelets of 2, c/f acute leukemic process - s/p PRBC/platelets transfusions and now transferred to [facility] for further work-up.     # Pancytopenia  - Wbc 3.29, lymphocyte predominant, Hgb 3.6, plt 2. Large, immature  cells were noted at [facility] per Dr. Pierre.  - PBS reviewed (4/18)- no blasts were seen.   - likely acute leukemia, will follow up flow   - Pt has mild elevation of PT, normal D-dimer, high fibrinogen and does not appear to be in DIC.  - requested a dose of ATRA to be given at [facility] as pt has gingival bleeding, dark stool which can be seen in APL   - Hemolysis is unlikely with LDH of 139, high haptoglobin and normal bili  - pt received 2 units of PRBC and 1 unit of plt at [facility]  - please check TLS labs daily (uric acid, LDH, phosph).  Replete calcium as needed  - please check daily coags,fibrinogen and d-dimer  - needs bone marrow biopsy on Monday  - will need MUGA and line placement in anticipation of chemotherapy   - monitor labs q12h for now   - transfuse to keep Hg>7 and plts >10 or 15 if febrile  - flowcytometry ordered- requisition form left with RN. Spoke to staff in flowcytometry for expedited processing        Case discussed with Dr. Atif Larson Chi, MD. PGY 5  Heme-Onc fellow  [phone]; [id number].

## 2020-04-18 NOTE — H&P ADULT - HISTORY OF PRESENT ILLNESS
34M with no PMH presented to OSH for c/o dizziness and feeling unwell, transferred to [facility] for further hematologic evaluation. Per pt, initially started noticing scattered bruising throughout his body (mostly thighs and arms) about 6 weeks ago; followed by one episode of epistaxis that lasted an hour, which pt has never had before. In the past 2 weeks, pt has also started noticing intermittent fevers and chills, responsive to tylenol. Has also started noticing very dark stools for the same time period and within the past week has started noticing spontaneous gum bleeding and endorsing severe fatigue and SOB with exertion. Also noted 15 lb weight loss, partially intentional over past 2-3 months. Pt with worsening dizziness in the past few days and inability to concentrate or think clearly, precipitating ED visit. +low grade headache loacted in posterior aspect of his head, denies sore throat, cough, +nausea but no vomiting, no abdominal pain, no diarrhea, no BRBPR, no urinary symptoms, no LE swelling, +visual changes.

## 2020-04-19 ENCOUNTER — TRANSCRIPTION ENCOUNTER (OUTPATIENT)
Age: 35
End: 2020-04-19

## 2020-04-19 DIAGNOSIS — Z29.9 ENCOUNTER FOR PROPHYLACTIC MEASURES, UNSPECIFIED: ICD-10-CM

## 2020-04-19 DIAGNOSIS — H53.8 OTHER VISUAL DISTURBANCES: ICD-10-CM

## 2020-04-19 DIAGNOSIS — D61.818 OTHER PANCYTOPENIA: ICD-10-CM

## 2020-04-19 DIAGNOSIS — B99.9 UNSPECIFIED INFECTIOUS DISEASE: ICD-10-CM

## 2020-04-19 LAB
ALBUMIN SERPL ELPH-MCNC: 3.7 G/DL — SIGNIFICANT CHANGE UP (ref 3.3–5)
ALBUMIN SERPL ELPH-MCNC: 4 G/DL — SIGNIFICANT CHANGE UP (ref 3.3–5)
ALP SERPL-CCNC: 66 U/L — SIGNIFICANT CHANGE UP (ref 40–120)
ALP SERPL-CCNC: 84 U/L — SIGNIFICANT CHANGE UP (ref 40–120)
ALT FLD-CCNC: 17 U/L — SIGNIFICANT CHANGE UP (ref 10–45)
ALT FLD-CCNC: 18 U/L — SIGNIFICANT CHANGE UP (ref 10–45)
ANION GAP SERPL CALC-SCNC: 11 MMOL/L — SIGNIFICANT CHANGE UP (ref 5–17)
ANION GAP SERPL CALC-SCNC: 12 MMOL/L — SIGNIFICANT CHANGE UP (ref 5–17)
APTT BLD: 26.6 SEC — LOW (ref 27.5–36.3)
APTT BLD: 27.9 SEC — SIGNIFICANT CHANGE UP (ref 27.5–36.3)
AST SERPL-CCNC: 7 U/L — LOW (ref 10–40)
AST SERPL-CCNC: 9 U/L — LOW (ref 10–40)
BASOPHILS # BLD AUTO: 0 K/UL — SIGNIFICANT CHANGE UP (ref 0–0.2)
BASOPHILS # BLD AUTO: 0 K/UL — SIGNIFICANT CHANGE UP (ref 0–0.2)
BASOPHILS NFR BLD AUTO: 0 % — SIGNIFICANT CHANGE UP (ref 0–2)
BASOPHILS NFR BLD AUTO: 0 % — SIGNIFICANT CHANGE UP (ref 0–2)
BILIRUB SERPL-MCNC: 0.7 MG/DL — SIGNIFICANT CHANGE UP (ref 0.2–1.2)
BILIRUB SERPL-MCNC: 1 MG/DL — SIGNIFICANT CHANGE UP (ref 0.2–1.2)
BLD GP AB SCN SERPL QL: NEGATIVE — SIGNIFICANT CHANGE UP
BUN SERPL-MCNC: 14 MG/DL — SIGNIFICANT CHANGE UP (ref 7–23)
BUN SERPL-MCNC: 14 MG/DL — SIGNIFICANT CHANGE UP (ref 7–23)
CALCIUM SERPL-MCNC: 9 MG/DL — SIGNIFICANT CHANGE UP (ref 8.4–10.5)
CALCIUM SERPL-MCNC: 9.1 MG/DL — SIGNIFICANT CHANGE UP (ref 8.4–10.5)
CHLORIDE SERPL-SCNC: 105 MMOL/L — SIGNIFICANT CHANGE UP (ref 96–108)
CHLORIDE SERPL-SCNC: 106 MMOL/L — SIGNIFICANT CHANGE UP (ref 96–108)
CO2 SERPL-SCNC: 22 MMOL/L — SIGNIFICANT CHANGE UP (ref 22–31)
CO2 SERPL-SCNC: 22 MMOL/L — SIGNIFICANT CHANGE UP (ref 22–31)
CREAT SERPL-MCNC: 0.78 MG/DL — SIGNIFICANT CHANGE UP (ref 0.5–1.3)
CREAT SERPL-MCNC: 0.79 MG/DL — SIGNIFICANT CHANGE UP (ref 0.5–1.3)
D DIMER BLD IA.RAPID-MCNC: 218 NG/ML DDU — SIGNIFICANT CHANGE UP
D DIMER BLD IA.RAPID-MCNC: 360 NG/ML DDU — HIGH
DAT POLY-SP REAG RBC QL: POSITIVE — SIGNIFICANT CHANGE UP
EOSINOPHIL # BLD AUTO: 0.01 K/UL — SIGNIFICANT CHANGE UP (ref 0–0.5)
EOSINOPHIL # BLD AUTO: 0.01 K/UL — SIGNIFICANT CHANGE UP (ref 0–0.5)
EOSINOPHIL NFR BLD AUTO: 0.6 % — SIGNIFICANT CHANGE UP (ref 0–6)
EOSINOPHIL NFR BLD AUTO: 0.6 % — SIGNIFICANT CHANGE UP (ref 0–6)
FIBRINOGEN PPP-MCNC: 562 MG/DL — HIGH (ref 320–500)
FIBRINOGEN PPP-MCNC: 633 MG/DL — HIGH (ref 320–500)
GLUCOSE SERPL-MCNC: 108 MG/DL — HIGH (ref 70–99)
GLUCOSE SERPL-MCNC: 94 MG/DL — SIGNIFICANT CHANGE UP (ref 70–99)
HCT VFR BLD CALC: 13.7 % — CRITICAL LOW (ref 39–50)
HCT VFR BLD CALC: 18.2 % — CRITICAL LOW (ref 39–50)
HGB BLD-MCNC: 4.8 G/DL — CRITICAL LOW (ref 13–17)
HGB BLD-MCNC: 6.6 G/DL — CRITICAL LOW (ref 13–17)
INR BLD: 1.12 RATIO — SIGNIFICANT CHANGE UP (ref 0.88–1.16)
INR BLD: 1.14 RATIO — SIGNIFICANT CHANGE UP (ref 0.88–1.16)
LDH SERPL L TO P-CCNC: 116 U/L — SIGNIFICANT CHANGE UP (ref 50–242)
LDH SERPL L TO P-CCNC: 98 U/L — SIGNIFICANT CHANGE UP (ref 50–242)
LYMPHOCYTES # BLD AUTO: 1.54 K/UL — SIGNIFICANT CHANGE UP (ref 1–3.3)
LYMPHOCYTES # BLD AUTO: 1.6 K/UL — SIGNIFICANT CHANGE UP (ref 1–3.3)
LYMPHOCYTES # BLD AUTO: 87.5 % — HIGH (ref 13–44)
LYMPHOCYTES # BLD AUTO: 89.9 % — HIGH (ref 13–44)
MAGNESIUM SERPL-MCNC: 2.4 MG/DL — SIGNIFICANT CHANGE UP (ref 1.6–2.6)
MAGNESIUM SERPL-MCNC: 2.4 MG/DL — SIGNIFICANT CHANGE UP (ref 1.6–2.6)
MCHC RBC-ENTMCNC: 30.6 PG — SIGNIFICANT CHANGE UP (ref 27–34)
MCHC RBC-ENTMCNC: 31.6 PG — SIGNIFICANT CHANGE UP (ref 27–34)
MCHC RBC-ENTMCNC: 35 GM/DL — SIGNIFICANT CHANGE UP (ref 32–36)
MCHC RBC-ENTMCNC: 36.3 GM/DL — HIGH (ref 32–36)
MCV RBC AUTO: 87.1 FL — SIGNIFICANT CHANGE UP (ref 80–100)
MCV RBC AUTO: 87.3 FL — SIGNIFICANT CHANGE UP (ref 80–100)
MONOCYTES # BLD AUTO: 0.05 K/UL — SIGNIFICANT CHANGE UP (ref 0–0.9)
MONOCYTES # BLD AUTO: 0.07 K/UL — SIGNIFICANT CHANGE UP (ref 0–0.9)
MONOCYTES NFR BLD AUTO: 2.8 % — SIGNIFICANT CHANGE UP (ref 2–14)
MONOCYTES NFR BLD AUTO: 4 % — SIGNIFICANT CHANGE UP (ref 2–14)
NEUTROPHILS # BLD AUTO: 0.12 K/UL — LOW (ref 1.8–7.4)
NEUTROPHILS # BLD AUTO: 0.14 K/UL — LOW (ref 1.8–7.4)
NEUTROPHILS NFR BLD AUTO: 6.7 % — LOW (ref 43–77)
NEUTROPHILS NFR BLD AUTO: 7.9 % — LOW (ref 43–77)
NRBC # BLD: 0 /100 WBCS — SIGNIFICANT CHANGE UP (ref 0–0)
NRBC # BLD: 0 /100 WBCS — SIGNIFICANT CHANGE UP (ref 0–0)
PHOSPHATE SERPL-MCNC: 3.7 MG/DL — SIGNIFICANT CHANGE UP (ref 2.5–4.5)
PHOSPHATE SERPL-MCNC: 3.9 MG/DL — SIGNIFICANT CHANGE UP (ref 2.5–4.5)
PLATELET # BLD AUTO: 36 K/UL — LOW (ref 150–400)
PLATELET # BLD AUTO: 38 K/UL — LOW (ref 150–400)
POTASSIUM SERPL-MCNC: 4.1 MMOL/L — SIGNIFICANT CHANGE UP (ref 3.5–5.3)
POTASSIUM SERPL-MCNC: 4.3 MMOL/L — SIGNIFICANT CHANGE UP (ref 3.5–5.3)
POTASSIUM SERPL-SCNC: 4.1 MMOL/L — SIGNIFICANT CHANGE UP (ref 3.5–5.3)
POTASSIUM SERPL-SCNC: 4.3 MMOL/L — SIGNIFICANT CHANGE UP (ref 3.5–5.3)
PROT SERPL-MCNC: 6.6 G/DL — SIGNIFICANT CHANGE UP (ref 6–8.3)
PROT SERPL-MCNC: 7 G/DL — SIGNIFICANT CHANGE UP (ref 6–8.3)
PROTHROM AB SERPL-ACNC: 12.9 SEC — SIGNIFICANT CHANGE UP (ref 10–12.9)
PROTHROM AB SERPL-ACNC: 13.1 SEC — HIGH (ref 10–12.9)
RBC # BLD: 1.57 M/UL — LOW (ref 4.2–5.8)
RBC # BLD: 2.09 M/UL — LOW (ref 4.2–5.8)
RBC # FLD: 12.9 % — SIGNIFICANT CHANGE UP (ref 10.3–14.5)
RBC # FLD: 13.2 % — SIGNIFICANT CHANGE UP (ref 10.3–14.5)
RH IG SCN BLD-IMP: POSITIVE — SIGNIFICANT CHANGE UP
SODIUM SERPL-SCNC: 138 MMOL/L — SIGNIFICANT CHANGE UP (ref 135–145)
SODIUM SERPL-SCNC: 140 MMOL/L — SIGNIFICANT CHANGE UP (ref 135–145)
URATE SERPL-MCNC: 2.3 MG/DL — LOW (ref 3.4–8.8)
URATE SERPL-MCNC: 2.7 MG/DL — LOW (ref 3.4–8.8)
WBC # BLD: 1.76 K/UL — LOW (ref 3.8–10.5)
WBC # BLD: 1.78 K/UL — LOW (ref 3.8–10.5)
WBC # FLD AUTO: 1.76 K/UL — LOW (ref 3.8–10.5)
WBC # FLD AUTO: 1.78 K/UL — LOW (ref 3.8–10.5)

## 2020-04-19 RX ADMIN — Medication 5 MILLILITER(S): at 23:23

## 2020-04-19 RX ADMIN — Medication 5 MILLILITER(S): at 21:01

## 2020-04-19 RX ADMIN — Medication 5 MILLILITER(S): at 18:50

## 2020-04-19 RX ADMIN — Medication 5 MILLILITER(S): at 06:39

## 2020-04-19 RX ADMIN — SODIUM CHLORIDE 75 MILLILITER(S): 9 INJECTION INTRAMUSCULAR; INTRAVENOUS; SUBCUTANEOUS at 12:21

## 2020-04-19 RX ADMIN — Medication 300 MILLIGRAM(S): at 12:21

## 2020-04-19 RX ADMIN — SODIUM CHLORIDE 75 MILLILITER(S): 9 INJECTION INTRAMUSCULAR; INTRAVENOUS; SUBCUTANEOUS at 06:40

## 2020-04-19 RX ADMIN — Medication 5 MILLILITER(S): at 12:21

## 2020-04-19 RX ADMIN — Medication 5 MILLILITER(S): at 11:23

## 2020-04-19 NOTE — DISCHARGE NOTE PROVIDER - HOSPITAL COURSE
34M with no PMH presented to OSH for c/o dizziness and feeling unwell, transferred to [facility] for further hematologic evaluation. Per pt, initially started noticing scattered bruising throughout his body (mostly thighs and arms) about 6 weeks ago; followed by one episode of epistaxis that lasted an hour, which pt has never had before. In the past 2 weeks, pt has also started noticing intermittent fevers and chills, responsive to tylenol. Has also started noticing very dark stools for the same time period and within the past week has started noticing spontaneous gum bleeding and endorsing severe fatigue and SOB with exertion. Also noted 15 lb weight loss, partially intentional over past 2-3 months. Pt with worsening dizziness in the past few days and inability to concentrate or think clearly, precipitating ED visit. +low grade headache loacted in posterior aspect of his head, denies sore throat, cough, +nausea but no vomiting, no abdominal pain, no diarrhea, no BRBPR, no urinary symptoms, no LE swelling, +visual changes.             Patient transferred to [facility] from [facility] on 4/18. Patient was started on IVF, Levaquin, and allopurinol. Patient transfused blood and platelets. Bone Marrow biopsy performed on ________________ 34M with no PMH presented to OSH for c/o dizziness and feeling unwell, transferred to [facility] for further hematologic evaluation. Per pt, initially started noticing scattered bruising throughout his body (mostly thighs and arms) about 6 weeks ago; followed by one episode of epistaxis that lasted an hour, which pt has never had before. In the past 2 weeks, pt has also started noticing intermittent fevers and chills, responsive to tylenol. Has also started noticing very dark stools for the same time period and within the past week has started noticing spontaneous gum bleeding and endorsing severe fatigue and SOB with exertion. Also noted 15 lb weight loss, partially intentional over past 2-3 months. Pt with worsening dizziness in the past few days and inability to concentrate or think clearly, precipitating ED visit. +low grade headache loacted in posterior aspect of his head, denies sore throat, cough, +nausea but no vomiting, no abdominal pain, no diarrhea, no BRBPR, no urinary symptoms, no LE swelling, +visual changes.             Patient transferred to [facility] from [facility] on 4/18. Patient was started on IVF, Levaquin, and allopurinol. Patient transfused blood and platelets. Bone Marrow biopsy performed on 4/20 revealing ____. 33 y/o M with no PMH presented to OSH for c/o dizziness and feeling unwell, transferred to [facility] for further hematologic evaluation. Per pt, initially started noticing scattered bruising throughout his body (mostly thighs and arms) about 6 weeks ago; followed by one episode of epistaxis that lasted an hour, which pt has never had before. In the past 2 weeks, pt has also started noticing intermittent fevers and chills, responsive to tylenol. Has also started noticing very dark stools for the same time period and within the past week has started noticing spontaneous gum bleeding and endorsing severe fatigue and SOB with exertion. Also noted 15 lbs weight loss, partially intentional over past 2-3 months. Pt with worsening dizziness in the past few days and inability to concentrate or think clearly, precipitating ED visit.  Also c/o mild headache located in posterior aspect of his head, denies sore throat, cough, +nausea but no vomiting, no abdominal pain, no diarrhea, no BRBPR, no urinary symptoms, no LE swelling, +visual changes.             Patient transferred to [facility] from [facility] on 4/18. Patient was started on IVF, Levaquin, and allopurinol. Patient transfused blood and platelets. Bone Marrow biopsy performed on 4/20 consistent with Aplastic Anemia. Horse ATG test dose applied 4/22 with no reaction. Treatment started 4/23 consisting of equine ATG at 40mg/kg/day x 4 days, Cyclosporine 10mg/kg/day divided doses BID, Eltrombopag 150mg daily. Patient had episode of hives with ATG infusion on Day 1 thus steroid regimen was changed from oral prednisone to IV Solumedrol Q8 for the duration of ATG treatment and transitioned back to oral prednisone 1mg/kg daily for days 5-10 then taper. 35 y/o M with no PMH presented to OSH for c/o dizziness and feeling unwell, transferred to [facility] for further hematologic evaluation. Per pt, initially started noticing scattered bruising throughout his body (mostly thighs and arms) about 6 weeks ago; followed by one episode of epistaxis that lasted an hour, which pt has never had before. In the past 2 weeks, pt has also started noticing intermittent fevers and chills, responsive to tylenol. Has also started noticing very dark stools for the same time period and within the past week has started noticing spontaneous gum bleeding and endorsing severe fatigue and SOB with exertion. Also noted 15 lbs weight loss, partially intentional over past 2-3 months. Pt with worsening dizziness in the past few days and inability to concentrate or think clearly, precipitating ED visit.  Also c/o mild headache located in posterior aspect of his head, denies sore throat, cough, +nausea but no vomiting, no abdominal pain, no diarrhea, no BRBPR, no urinary symptoms, no LE swelling, +visual changes.             Patient transferred to [facility] from [facility] on 4/18. Patient was started on IVF, Levaquin, and allopurinol. Patient transfused blood and platelets. Bone Marrow biopsy performed on 4/20 consistent with Aplastic Anemia. Horse ATG test dose applied 4/22 with no reaction. Treatment started 4/23 consisting of equine ATG at 40mg/kg/day x 4 days, Cyclosporine 10mg/kg/day divided doses BID, Eltrombopag 150mg daily. Patient had episode of hives with ATG infusion on Day 1 thus steroid regimen was changed from oral prednisone to IV Solumedrol Q8 for the duration of ATG treatment and transitioned back to oral prednisone 1mg/kg daily for days 5-10 then taper.        The patient had grade 3 transaminitis llikely from Promacta and ATG and Promacta was held as of 5/1. 35 y/o M with no PMH presented to OSH for c/o dizziness and feeling unwell, transferred to [facility] for further hematologic evaluation. Per pt, initially started noticing scattered bruising throughout his body (mostly thighs and arms) about 6 weeks ago; followed by one episode of epistaxis that lasted an hour, which pt has never had before. In the past 2 weeks, pt has also started noticing intermittent fevers and chills, responsive to tylenol. Has also started noticing very dark stools for the same time period and within the past week has started noticing spontaneous gum bleeding and endorsing severe fatigue and SOB with exertion. Also noted 15 lbs weight loss, partially intentional over past 2-3 months. Pt with worsening dizziness in the past few days and inability to concentrate or think clearly, precipitating ED visit.  Also c/o mild headache located in posterior aspect of his head, denies sore throat, cough, +nausea but no vomiting, no abdominal pain, no diarrhea, no BRBPR, no urinary symptoms, no LE swelling, +visual changes.             Patient transferred to [facility] from [facility] on 4/18. Patient was started on IVF, Levaquin, and allopurinol. Patient transfused blood and platelets. Bone Marrow biopsy performed on 4/20 consistent with Aplastic Anemia. Horse ATG test dose applied 4/22 with no reaction. Treatment started 4/23 consisting of equine ATG at 40mg/kg/day x 4 days, Cyclosporine 10mg/kg/day divided doses BID, Eltrombopag 150mg daily. Patient had episode of hives with ATG infusion on Day 1 thus steroid regimen was changed from oral prednisone to IV Solumedrol Q8 for the duration of ATG treatment and transitioned back to oral prednisone 1mg/kg daily for days 5-10 then taper.        The patient had grade 3 transaminitis llikely from Promacta and ATG and Promacta was held as of 5/1.  Pt's ALT transaminitis is slowly improving 35 y/o M with no PMH presented to OSH for c/o dizziness and feeling unwell, transferred to [facility] for further hematologic evaluation. Per pt, initially started noticing scattered bruising throughout his body (mostly thighs and arms) about 6 weeks ago; followed by one episode of epistaxis that lasted an hour, which pt has never had before. In the past 2 weeks, pt has also started noticing intermittent fevers and chills, responsive to tylenol. Has also started noticing very dark stools for the same time period and within the past week has started noticing spontaneous gum bleeding and endorsing severe fatigue and SOB with exertion. Also noted 15 lbs weight loss, partially intentional over past 2-3 months. Pt with worsening dizziness in the past few days and inability to concentrate or think clearly, precipitating ED visit.  Also c/o mild headache located in posterior aspect of his head, denies sore throat, cough, +nausea but no vomiting, no abdominal pain, no diarrhea, no BRBPR, no urinary symptoms, no LE swelling, +visual changes.         Patient transferred to [facility] from [facility] on 4/18. Patient was started on IVF, Levaquin, and allopurinol. Patient transfused blood and platelets. Bone Marrow biopsy performed on 4/20 consistent with Aplastic Anemia. Horse ATG test dose applied 4/22 with no reaction. Treatment started 4/23 consisting of equine ATG at 40mg/kg/day x 4 days, Cyclosporine 10mg/kg/day divided doses BID, Eltrombopag 150mg daily. Patient had episode of hives with ATG infusion on Day 1 thus steroid regimen was changed from oral prednisone to IV Solumedrol Q8 for the duration of ATG treatment and transitioned back to oral prednisone 1mg/kg daily for days 5-10 then taper. The patient had grade 3 transaminitis likely from Promacta and ATG and Promacta was held as of 5/1. 35 y/o M with no PMH presented to OSH for c/o dizziness and feeling unwell, transferred to [facility] for further hematologic evaluation. Per pt, initially started noticing scattered bruising throughout his body (mostly thighs and arms) about 6 weeks ago; followed by one episode of epistaxis that lasted an hour, which pt has never had before. In the past 2 weeks, pt has also started noticing intermittent fevers and chills, responsive to tylenol. Has also started noticing very dark stools for the same time period and within the past week has started noticing spontaneous gum bleeding and endorsing severe fatigue and SOB with exertion. Also noted 15 lbs weight loss, partially intentional over past 2-3 months. Pt with worsening dizziness in the past few days and inability to concentrate or think clearly, precipitating ED visit.  Also c/o mild headache located in posterior aspect of his head, denies sore throat, cough, +nausea but no vomiting, no abdominal pain, no diarrhea, no BRBPR, no urinary symptoms, no LE swelling, +visual changes.         Patient transferred to [facility] from [facility] on 4/18. Patient was started on IVF, Levaquin, and allopurinol. Patient transfused blood and platelets. Bone Marrow biopsy performed on 4/20 consistent with Aplastic Anemia. Horse ATG test dose applied 4/22 with no reaction. Treatment started 4/23 consisting of equine ATG at 40mg/kg/day x 4 days, Cyclosporine 10mg/kg/day divided doses BID, Eltrombopag 150mg daily. Patient had episode of hives with ATG infusion on Day 1 thus steroid regimen was changed from oral prednisone to IV Solumedrol Q8 for the duration of ATG treatment and transitioned back to oral prednisone 1mg/kg daily for days 5-10 then taper. The patient had grade 3 transaminitis likely from Promacta and ATG and Promacta was held as of 5/1.      on 5/12, pt's transaminitis improved(grade 2 total bili, grade 1 AST, grade 2 ALT) Promacta 150 mg po QD resumed. 33 y/o M with no PMH presented to OSH for c/o dizziness and feeling unwell, transferred to [facility] for further hematologic evaluation. Per pt, initially started noticing scattered bruising throughout his body (mostly thighs and arms) about 6 weeks ago; followed by one episode of epistaxis that lasted an hour, which pt has never had before. In the past 2 weeks, pt has also started noticing intermittent fevers and chills, responsive to tylenol. Has also started noticing very dark stools for the same time period and within the past week has started noticing spontaneous gum bleeding and endorsing severe fatigue and SOB with exertion. Also noted 15 lbs weight loss, partially intentional over past 2-3 months. Pt with worsening dizziness in the past few days and inability to concentrate or think clearly, precipitating ED visit.  Also c/o mild headache located in posterior aspect of his head, denies sore throat, cough, +nausea but no vomiting, no abdominal pain, no diarrhea, no BRBPR, no urinary symptoms, no LE swelling, +visual changes.         Patient transferred to [facility] from [facility] on 4/18. Patient was started on IVF, Levaquin, and allopurinol. Patient transfused blood and platelets. Bone Marrow biopsy performed on 4/20 consistent with Aplastic Anemia. Horse ATG test dose applied 4/22 with no reaction. Treatment started 4/23 consisting of equine ATG at 40mg/kg/day x 4 days, Cyclosporine 10mg/kg/day divided doses BID, Eltrombopag 150mg daily. Patient had episode of hives with ATG infusion on Day 1 thus steroid regimen was changed from oral prednisone to IV Solumedrol Q8 for the duration of ATG treatment and transitioned back to oral prednisone 1mg/kg daily for days 5-10 then taper. The patient had grade 3 transaminitis likely from Promacta and ATG and Promacta was held as of 5/1.      on 5/12, pt's transaminitis improved(grade 2 total bili, grade 1 AST, grade 2 ALT) Promacta 150 mg po QD resumed.     Pt's cyclosporine dose was adjusted  according to  the  level(goal 200-400)  and he is on 500 mg po q12h. 33 y/o M with no PMH presented to OSH for c/o dizziness and feeling unwell, transferred to [facility] for further hematologic evaluation. Per pt, initially started noticing scattered bruising throughout his body (mostly thighs and arms) about 6 weeks ago; followed by one episode of epistaxis that lasted an hour, which pt has never had before. In the past 2 weeks, pt has also started noticing intermittent fevers and chills, responsive to tylenol. Has also started noticing very dark stools for the same time period and within the past week has started noticing spontaneous gum bleeding and endorsing severe fatigue and SOB with exertion. Also noted 15 lbs weight loss, partially intentional over past 2-3 months. Pt with worsening dizziness in the past few days and inability to concentrate or think clearly, precipitating ED visit.  Also c/o mild headache located in posterior aspect of his head, denies sore throat, cough, +nausea but no vomiting, no abdominal pain, no diarrhea, no BRBPR, no urinary symptoms, no LE swelling, +visual changes.         Patient transferred to [facility] from [facility] on 4/18. Patient was started on IVF, Levaquin, and allopurinol. Patient transfused blood and platelets. Bone Marrow biopsy performed on 4/20 consistent with Aplastic Anemia. Horse ATG test dose applied 4/22 with no reaction. Treatment started 4/23 consisting of equine ATG at 40mg/kg/day x 4 days, Cyclosporine 10mg/kg/day divided doses BID, Eltrombopag 150mg daily. Patient had episode of hives with ATG infusion on Day 1 thus steroid regimen was changed from oral prednisone to IV Solumedrol Q8 for the duration of ATG treatment and transitioned back to oral prednisone 1mg/kg daily for days 5-10 then taper. The patient had grade 3 transaminitis likely from Promacta and ATG and Promacta was held as of 5/1.      on 5/12, pt's transaminitis improved(grade 2 total bili, grade 1 AST, grade 2 ALT) Promacta 150 mg po QD resumed.     Pt's cyclosporine dose was adjusted  according to  the  level(goal 200-400)  and he is on 500 mg po q12h. On 5/16, patient developed chest discomfort during platelet transfusion which improved with benadryl. EKG and CXR were wnl. Patient originally refused benadryl pre-medication prior to platelets that day. 35 y/o M with no PMH presented to OSH for c/o dizziness and feeling unwell, transferred to [facility] for further hematologic evaluation. Per pt, initially started noticing scattered bruising throughout his body (mostly thighs and arms) about 6 weeks ago; followed by one episode of epistaxis that lasted an hour, which pt has never had before. In the past 2 weeks, pt has also started noticing intermittent fevers and chills, responsive to tylenol. Has also started noticing very dark stools for the same time period and within the past week has started noticing spontaneous gum bleeding and endorsing severe fatigue and SOB with exertion. Also noted 15 lbs weight loss, partially intentional over past 2-3 months. Pt with worsening dizziness in the past few days and inability to concentrate or think clearly, precipitating ED visit.  Also c/o mild headache located in posterior aspect of his head, denies sore throat, cough, +nausea but no vomiting, no abdominal pain, no diarrhea, no BRBPR, no urinary symptoms, no LE swelling, +visual changes.         Patient transferred to [facility] from [facility] on 4/18. Patient was started on IVF, Levaquin, and allopurinol. Patient transfused blood and platelets. Bone Marrow biopsy performed on 4/20 consistent with Aplastic Anemia. Horse ATG test dose applied 4/22 with no reaction. Treatment started 4/23 consisting of equine ATG at 40mg/kg/day x 4 days, Cyclosporine 10mg/kg/day divided doses BID, Eltrombopag 150mg daily. Patient had episode of hives with ATG infusion on Day 1 thus steroid regimen was changed from oral prednisone to IV Solumedrol Q8 for the duration of ATG treatment and transitioned back to oral prednisone 1mg/kg daily for days 5-10 then taper. The patient had grade 3 transaminitis likely from Promacta and ATG and Promacta was held as of 5/1.      on 5/12, pt's transaminitis improved(grade 2 total bili, grade 1 AST, grade 2 ALT) Promacta 150 mg po QD resumed.     Pt's cyclosporine dose was adjusted  according to  the  level(goal 200-400)  and he is on 400 mg po q12h as of 5/18. On 5/16, patient developed chest discomfort during platelet transfusion which improved with benadryl. EKG and CXR were wnl. Patient originally refused benadryl pre-medication prior to platelets that day.  Pt reported side effect with taking Benadryl, it was decided to premedicate pt with Claritin or Zyrtec pre blood transfusion. 35 y/o M with no PMH presented to OSH for c/o dizziness and feeling unwell, transferred to [facility] for further hematologic evaluation. Per pt, initially started noticing scattered bruising throughout his body (mostly thighs and arms) about 6 weeks ago; followed by one episode of epistaxis that lasted an hour, which pt has never had before. In the past 2 weeks, pt has also started noticing intermittent fevers and chills, responsive to tylenol. Has also started noticing very dark stools for the same time period and within the past week has started noticing spontaneous gum bleeding and endorsing severe fatigue and SOB with exertion. Also noted 15 lbs weight loss, partially intentional over past 2-3 months. Pt with worsening dizziness in the past few days and inability to concentrate or think clearly, precipitating ED visit.  Also c/o mild headache located in posterior aspect of his head, denies sore throat, cough, +nausea but no vomiting, no abdominal pain, no diarrhea, no BRBPR, no urinary symptoms, no LE swelling, +visual changes.         Patient transferred to [facility] from [facility] on 4/18. Patient was started on IVF, Levaquin, and allopurinol. Patient transfused blood and platelets. Bone Marrow biopsy performed on 4/20 consistent with Aplastic Anemia. Horse ATG test dose applied 4/22 with no reaction. Treatment started 4/23 consisting of equine ATG at 40mg/kg/day x 4 days, Cyclosporine 10mg/kg/day divided doses BID, Eltrombopag 150mg daily. Patient had episode of hives with ATG infusion on Day 1 thus steroid regimen was changed from oral prednisone to IV Solumedrol Q8 for the duration of ATG treatment and transitioned back to oral prednisone 1mg/kg daily for days 5-10 then taper. The patient had grade 3 transaminitis likely from Promacta and ATG and Promacta was held as of 5/1.      on 5/12, pt's transaminitis improved(grade 2 total bili, grade 1 AST, grade 2 ALT) Promacta 150 mg po QD resumed.     Pt's cyclosporine dose was adjusted  according to  the  level(goal 200-400)  and he is on 400 mg po q12h as of 5/18. On 5/16, patient developed chest discomfort during platelet transfusion which improved with benadryl. EKG and CXR were wnl. Patient originally refused benadryl pre-medication prior to platelets that day.  Pt reported side effect with taking Benadryl, it was decided to premedicate pt with Claritin or Zyrtec pre blood transfusion. On 5/20, cyclosporin level was 60; dose lowered to 300 mg PO BID and will follow up level as an outpatient.

## 2020-04-19 NOTE — PROGRESS NOTE ADULT - SUBJECTIVE AND OBJECTIVE BOX
Diagnosis: r/o new leukemia vs. aplastic anemia0    Protocol/Chemo Regimen: to be determined  Day: n/a     Pt endorsed:    Review of Systems:    Pain scale:                                        Location:    Diet: regular    Allergies: No Known Allergies    ANTIMICROBIALS  levoFLOXacin  Tablet 500 milliGRAM(s) Oral every 24 hours      STANDING MEDICATIONS  allopurinol 300 milliGRAM(s) Oral daily  Biotene Dry Mouth Oral Rinse 5 milliLiter(s) Swish and Spit five times a day  sodium chloride 0.9%. 1000 milliLiter(s) IV Continuous <Continuous>      PRN MEDICATIONS  acetaminophen   Tablet .. 650 milliGRAM(s) Oral every 6 hours PRN  ondansetron Injectable 4 milliGRAM(s) IV Push every 6 hours PRN      Vital Signs Last 24 Hrs  T(C): 37 (19 Apr 2020 00:30), Max: 37.1 (18 Apr 2020 17:40)  T(F): 98.6 (19 Apr 2020 00:30), Max: 98.8 (18 Apr 2020 17:40)  HR: 96 (19 Apr 2020 00:30) (92 - 104)  BP: 115/68 (19 Apr 2020 00:30) (112/68 - 129/70)  RR: 18 (19 Apr 2020 00:30) (16 - 20)  SpO2: 98% (19 Apr 2020 00:30) (97% - 100%)    PHYSICAL EXAM  General: adult in NAD  HEENT: clear oropharynx, anicteric sclera, pink conjunctiva  Neck: supple  CV: normal S1/S2 RRR  Lungs: positive air movement b/l ant lungs,clear to auscultation, no wheezes, no rales  Abdomen: soft non-tender non-distended  Ext: no clubbing cyanosis or edema  Skin: no rashes and no petechiae  Neuro: alert and oriented X 4, no focal deficits  Central Line: normal    LABS:    Cultures:     Culture - Urine (09.24.15 @ 19:14)    Specimen Source: .Urine Clean Catch (Midstream)    Culture Results:   No growth                  RADIOLOGY & ADDITIONAL STUDIES:    from: CT Head No Cont (04.17.20 @ 16:11)   IMPRESSION:   1.  No acute intracranial hemorrhage or mass effect.   2.  Paranasal sinusitis. Diagnosis: r/o new leukemia vs. aplastic anemia0    Protocol/Chemo Regimen: to be determined  Day: n/a     Pt endorsed:    Review of Systems:    Pain scale:                                        Location:    Diet: regular    Allergies: No Known Allergies    ANTIMICROBIALS  levoFLOXacin  Tablet 500 milliGRAM(s) Oral every 24 hours      STANDING MEDICATIONS  allopurinol 300 milliGRAM(s) Oral daily  Biotene Dry Mouth Oral Rinse 5 milliLiter(s) Swish and Spit five times a day  sodium chloride 0.9%. 1000 milliLiter(s) IV Continuous <Continuous>      PRN MEDICATIONS  acetaminophen   Tablet .. 650 milliGRAM(s) Oral every 6 hours PRN  ondansetron Injectable 4 milliGRAM(s) IV Push every 6 hours PRN      Vital Signs Last 24 Hrs  T(C): 37 (2020 00:30), Max: 37.1 (2020 17:40)  T(F): 98.6 (2020 00:30), Max: 98.8 (2020 17:40)  HR: 96 (2020 00:30) (92 - 104)  BP: 115/68 (2020 00:30) (112/68 - 129/70)  RR: 18 (2020 00:30) (16 - 20)  SpO2: 98% (2020 00:30) (97% - 100%)    PHYSICAL EXAM  General: adult in NAD  HEENT: clear oropharynx, anicteric sclera, pink conjunctiva  Neck: supple  CV: normal S1/S2 RRR  Lungs: positive air movement b/l ant lungs,clear to auscultation, no wheezes, no rales  Abdomen: soft non-tender non-distended  Ext: no clubbing cyanosis or edema  Skin: no rashes and no petechiae  Neuro: alert and oriented X 4, no focal deficits  Central Line: normal    LABS:    Cultures:     Culture - Urine (09.24.15 @ 19:14)    Specimen Source: .Urine Clean Catch (Midstream)    Culture Results:   No growth    CARDIAC MARKERS ( 2020 17:50 )  x     / x     / 86 U/L / x     / 1.0 ng/mL                          4.8    1.78  )-----------( 38       ( 2020 07:04 )             13.7     2020 07:04    140    |  106    |  14     ----------------------------<  94     4.3     |  22     |  0.78     Ca    9.0        2020 07:04  Phos  3.7       2020 07:04  Mg     2.4       2020 07:04    TPro  6.6    /  Alb  3.7    /  TBili  0.7    /  DBili  x      /  AST  9      /  ALT  17     /  AlkPhos  66     2020 07:04    PT/INR - ( 2020 07:04 )   PT: 12.9 sec;   INR: 1.12 ratio    PTT - ( 2020 07:04 )  PTT:26.6 sec      LIVER FUNCTIONS - ( 2020 07:04 )  Alb: 3.7 g/dL / Pro: 6.6 g/dL / ALK PHOS: 66 U/L / ALT: 17 U/L / AST: 9 U/L / GGT: x           Urinalysis Basic - ( 2020 09:36 )    Color: Yellow / Appearance: Clear / S.010 / pH: x  Gluc: x / Ketone: Negative  / Bili: Negative / Urobili: Negative mg/dL   Blood: x / Protein: Negative mg/dL / Nitrite: Negative   Leuk Esterase: Negative / RBC: x / WBC x   Sq Epi: x / Non Sq Epi: x / Bacteria: x      RADIOLOGY & ADDITIONAL STUDIES:    from: CT Head No Cont (20 @ 16:11)   IMPRESSION:   1.  No acute intracranial hemorrhage or mass effect.   2.  Paranasal sinusitis. Diagnosis: r/o new leukemia vs. aplastic anemia0    Protocol/Chemo Regimen: to be determined  Day: n/a     Pt endorsed: blurry vision, intermittent headaches; chest discomfort resolved    Review of Systems: denies nausea, vomiting, diarrhea, chest pain, SOB.    Pain scale: 0                                           Diet: regular    Allergies: No Known Allergies    ANTIMICROBIALS  levoFLOXacin  Tablet 500 milliGRAM(s) Oral every 24 hours      STANDING MEDICATIONS  allopurinol 300 milliGRAM(s) Oral daily  Biotene Dry Mouth Oral Rinse 5 milliLiter(s) Swish and Spit five times a day  sodium chloride 0.9%. 1000 milliLiter(s) IV Continuous <Continuous>      PRN MEDICATIONS  acetaminophen   Tablet .. 650 milliGRAM(s) Oral every 6 hours PRN  ondansetron Injectable 4 milliGRAM(s) IV Push every 6 hours PRN      Vital Signs Last 24 Hrs  T(C): 37 (2020 00:30), Max: 37.1 (2020 17:40)  T(F): 98.6 (2020 00:30), Max: 98.8 (2020 17:40)  HR: 96 (2020 00:30) (92 - 104)  BP: 115/68 (2020 00:30) (112/68 - 129/70)  RR: 18 (2020 00:30) (16 - 20)  SpO2: 98% (2020 00:30) (97% - 100%)    PHYSICAL EXAM  General: adult in NAD  HEENT: clear oropharynx, anicteric sclera, pink conjunctiva  Neck: supple  CV: normal S1/S2 RRR  Lungs: positive air movement b/l ant lungs,clear to auscultation, no wheezes, no rales  Abdomen: soft non-tender non-distended  Ext: no clubbing cyanosis or edema  Skin: no rashes and no petechiae  Neuro: alert and oriented X 4, no focal deficits  Central Line: PIV    LABS:    Cultures:     Culture - Urine (09.24.15 @ 19:14)    Specimen Source: .Urine Clean Catch (Midstream)    Culture Results:   No growth    CARDIAC MARKERS ( 2020 17:50 )  x     / x     / 86 U/L / x     / 1.0 ng/mL                          4.8    1.78  )-----------( 38       ( 2020 07:04 )             13.7     2020 07:04    140    |  106    |  14     ----------------------------<  94     4.3     |  22     |  0.78     Ca    9.0        2020 07:04  Phos  3.7       2020 07:04  Mg     2.4       2020 07:04    TPro  6.6    /  Alb  3.7    /  TBili  0.7    /  DBili  x      /  AST  9      /  ALT  17     /  AlkPhos  66     2020 07:04    PT/INR - ( 2020 07:04 )   PT: 12.9 sec;   INR: 1.12 ratio    PTT - ( 2020 07:04 )  PTT:26.6 sec      LIVER FUNCTIONS - ( 2020 07:04 )  Alb: 3.7 g/dL / Pro: 6.6 g/dL / ALK PHOS: 66 U/L / ALT: 17 U/L / AST: 9 U/L / GGT: x           Urinalysis Basic - ( 2020 09:36 )    Color: Yellow / Appearance: Clear / S.010 / pH: x  Gluc: x / Ketone: Negative  / Bili: Negative / Urobili: Negative mg/dL   Blood: x / Protein: Negative mg/dL / Nitrite: Negative   Leuk Esterase: Negative / RBC: x / WBC x   Sq Epi: x / Non Sq Epi: x / Bacteria: x      RADIOLOGY & ADDITIONAL STUDIES:    from: CT Head No Cont (20 @ 16:11)   IMPRESSION:   1.  No acute intracranial hemorrhage or mass effect.   2.  Paranasal sinusitis. Diagnosis: r/o new leukemia vs. aplastic anemia    Protocol/Chemo Regimen: to be determined  Day: n/a     Pt endorsed: blurry vision, intermittent headaches; chest discomfort resolved    Review of Systems: denies nausea, vomiting, diarrhea, chest pain, SOB.    Pain scale: 0                                           Diet: regular    Allergies: No Known Allergies    ANTIMICROBIALS  levoFLOXacin  Tablet 500 milliGRAM(s) Oral every 24 hours      STANDING MEDICATIONS  allopurinol 300 milliGRAM(s) Oral daily  Biotene Dry Mouth Oral Rinse 5 milliLiter(s) Swish and Spit five times a day  sodium chloride 0.9%. 1000 milliLiter(s) IV Continuous <Continuous>      PRN MEDICATIONS  acetaminophen   Tablet .. 650 milliGRAM(s) Oral every 6 hours PRN  ondansetron Injectable 4 milliGRAM(s) IV Push every 6 hours PRN      Vital Signs Last 24 Hrs  T(C): 37 (2020 00:30), Max: 37.1 (2020 17:40)  T(F): 98.6 (2020 00:30), Max: 98.8 (2020 17:40)  HR: 96 (2020 00:30) (92 - 104)  BP: 115/68 (2020 00:30) (112/68 - 129/70)  RR: 18 (2020 00:30) (16 - 20)  SpO2: 98% (2020 00:30) (97% - 100%)    PHYSICAL EXAM  General: adult in NAD  HEENT: clear oropharynx, anicteric sclera, pink conjunctiva  Neck: supple  CV: normal S1/S2 RRR  Lungs: positive air movement b/l ant lungs,clear to auscultation, no wheezes, no rales  Abdomen: soft non-tender non-distended  Ext: no clubbing cyanosis or edema  Skin: no rashes and no petechiae  Neuro: alert and oriented X 4, no focal deficits  Central Line: PIV    LABS:    Cultures:     Culture - Urine (09.24.15 @ 19:14)    Specimen Source: .Urine Clean Catch (Midstream)    Culture Results:   No growth    CARDIAC MARKERS ( 2020 17:50 )  x     / x     / 86 U/L / x     / 1.0 ng/mL                          4.8    1.78  )-----------( 38       ( 2020 07:04 )             13.7     2020 07:04    140    |  106    |  14     ----------------------------<  94     4.3     |  22     |  0.78     Ca    9.0        2020 07:04  Phos  3.7       2020 07:04  Mg     2.4       2020 07:04    TPro  6.6    /  Alb  3.7    /  TBili  0.7    /  DBili  x      /  AST  9      /  ALT  17     /  AlkPhos  66     2020 07:04    PT/INR - ( 2020 07:04 )   PT: 12.9 sec;   INR: 1.12 ratio    PTT - ( 2020 07:04 )  PTT:26.6 sec      LIVER FUNCTIONS - ( 2020 07:04 )  Alb: 3.7 g/dL / Pro: 6.6 g/dL / ALK PHOS: 66 U/L / ALT: 17 U/L / AST: 9 U/L / GGT: x           Urinalysis Basic - ( 2020 09:36 )    Color: Yellow / Appearance: Clear / S.010 / pH: x  Gluc: x / Ketone: Negative  / Bili: Negative / Urobili: Negative mg/dL   Blood: x / Protein: Negative mg/dL / Nitrite: Negative   Leuk Esterase: Negative / RBC: x / WBC x   Sq Epi: x / Non Sq Epi: x / Bacteria: x      RADIOLOGY & ADDITIONAL STUDIES:    from: CT Head No Cont (20 @ 16:11)   IMPRESSION:   1.  No acute intracranial hemorrhage or mass effect.   2.  Paranasal sinusitis.

## 2020-04-19 NOTE — DISCHARGE NOTE PROVIDER - NSDCFUSCHEDAPPT_GEN_ALL_CORE_FT
RENETTA CARDENAS ; 05/28/2020 ; [facility] RENETTA CARDENAS ; 05/23/2020 ; [facility]  RENETTA CARDENAS ; 05/23/2020 ; [facility]  RENETTA CARDENAS ; 05/26/2020 ; [facility]  RENETTA CARDENAS ; 05/28/2020 ; [facility]  RENETTA CARDENAS ; 05/29/2020 ; [facility]

## 2020-04-19 NOTE — DISCHARGE NOTE PROVIDER - NSDCCPCAREPLAN_GEN_ALL_CORE_FT
PRINCIPAL DISCHARGE DIAGNOSIS  Diagnosis: Aplastic anemia  Assessment and Plan of Treatment: Maintain counts, remain free from infection  Notify MD and report to ER for any temperature greater than or equal to 100.4 degrees, intractable nausea, vomiting, diarrhea, or uncontrolled bleeding. PRINCIPAL DISCHARGE DIAGNOSIS  Diagnosis: Aplastic anemia  Assessment and Plan of Treatment: Maintain counts, remain free from infection  Notify MD and report to ER for any temperature greater than or equal to 100.4 degrees, intractable nausea, vomiting, diarrhea, or uncontrolled bleeding. Take Cyclosporin/ Promacta as directed        SECONDARY DISCHARGE DIAGNOSES  Diagnosis: Hypertension  Assessment and Plan of Treatment: Continue to take hydrochlorothizide as directed.    Diagnosis: Prophylactic measure  Assessment and Plan of Treatment: Continue to Acyclovir, Levaquin  and Diflucan as directed

## 2020-04-19 NOTE — PROGRESS NOTE ADULT - ATTENDING COMMENTS
34M with no PMH presented to OSH with dizziness x 2 weeks, easy bruising x 2-4 weeks. At home had a syncopal event and called 911, found to have hemoglobin 3s, Platelets 2K, no evidence of DIC. Noted mild petechiae and has had gum bleeding. These have improved. Noted dark stools for 2 weeks. Peripheral smear with immature forms concerning for acute leukemia. Flow and FISH pending.   -f/u flow cytometry, FISH results  -will continue ATRA 50 mg BID until PML-CHANTELLE negative   -CBC, TLS, DIC labs q 12h   -start allopurinol   -transfuse hb< 7, Plt <50k until PML-CHANTELLE negative   -monitor for s/s of bleeding   -will need bone marrow biopsy on Monday 34M with no PMH presented to OSH with dizziness x 2 weeks, easy bruising x 2-4 weeks. At home had a syncopal event and called 911, found to have hemoglobin 3s, Platelets 2K, no evidence of DIC. Noted mild petechiae and has had gum bleeding. These have improved. Noted dark stools for 2 weeks. Peripheral smear with immature forms concerning for acute leukemia vs. aplastic anemia   -peripheral flow without blast population, FISH pending   -will continue ATRA 50 mg BID until PML-CHANTELLE negative   -CBC, TLS, DIC labs q 12h   -start allopurinol   -transfuse hb< 7, Plt <50k   -optho consult given hazy vision, suspect rentinal bleed given low platelets   -monitor for s/s of bleeding, gum bleeding resolved   -will need bone marrow biopsy on Monday

## 2020-04-19 NOTE — DISCHARGE NOTE PROVIDER - NSDCFUADDAPPT_GEN_ALL_CORE_FT
Follow-Up:  Patient should follow up his/her ophthalmologist or in the [facility] within one week  [address] [facility]. [address] [place] [address] [id number] [phone] Follow-Up:  Patient should follow up his/her ophthalmologist or in the [facility] within one week  [address] [facility]. [address] [place] [id number]  [phone]    You have a telehealth appointment with Dr Goldberg on Thursday 5/28/2- at 3pm Follow-Up:  Patient should follow up his/her ophthalmologist or in the [facility] within one week  [address] [facility]. [address] [place] [id number]  [phone]    You have a telehealth appointment with Dr Goldberg on Thursday 5/28/2- at 3pm  You are scheduled for possible platelet transfusion appointments as followed:  Saturday 5/23 at 1pm  Tuesday 5/26 at 3:30pm  Friday 5/29 at 3:30 pm Follow-Up:  Patient should follow up his/her ophthalmologist or in the [facility] within one week  [address] [facility]. [address] [place] [address]  [phone]    You will have your Cyclosporin level/phos  checked on Saturday 5/23, DON"T take your morning CYCLOSPORIN dose; bring your morning dose with you and follow up your results with Dr Goldberg.     You have a telehealth appointment with Dr Goldberg on Thursday 5/28/2- at 3pm  You are scheduled for possible platelet transfusion appointments as followed:  Saturday 5/23 at 1pm  Tuesday 5/26 at 3:30pm  Friday 5/29 at 3:30 pm

## 2020-04-19 NOTE — PROGRESS NOTE ADULT - ASSESSMENT
34M with no PMH p/w dizziness in setting of 2 weeks of fevers/chills, melena and easy bruising, found to be pancytopenic with Hb 3.6 and platelets of 2. Rule out acute leukemic process vs aplastic anemia. S/p PRBC/platelets transfusions and now transferred to [facility] from [facility] for further work-up. 35 y/o Male with no PMH p/w dizziness in setting of 2 weeks of fevers/chills, melena and easy bruising, found to be pancytopenic with Hb 3.6 and platelets of 2. Rule out acute leukemic process vs aplastic anemia. Patient  PRBC/platelets transfusions and now transferred to [facility] from [facility] for further work-up. Patient has pancytopenia secondary to disease process.

## 2020-04-19 NOTE — DISCHARGE NOTE PROVIDER - NSDCMRMEDTOKEN_GEN_ALL_CORE_FT
albuterol CFC free 90 mcg/inh inhalation aerosol: 2 puff(s) inhaled 4 times a day cycloSPORINE 100 mg/mL oral solution: 4.85 milliliter(s) orally every 12 hours  eltrombopag 25 mg oral tablet: 6 tab(s) orally once a day cycloSPORINE 100 mg/mL oral solution: 4.85 milliliter(s) orally every 12 hours  cycloSPORINE 100 mg/mL oral solution: 6 milliliter(s) orally every 12 hours   eltrombopag 25 mg oral tablet: 6 tab(s) orally once a day acetaminophen 325 mg oral tablet: 2 tab(s) orally every 6 hours, As needed, Temp greater or equal to 38C (100.4F), Mild Pain (1 - 3) over the counter   acyclovir 400 mg oral tablet: 1 tab(s) orally every 8 hours  cycloSPORINE 100 mg oral capsule: 4 cap(s) orally every 12 hours  Diflucan 200 mg oral tablet: 1 tab(s) orally once a day   eltrombopag 25 mg oral tablet: 6 tab(s) orally once a day  famotidine 20 mg oral tablet: 1 tab(s) orally 2 times a day  Levaquin 500 mg oral tablet: 1 tab(s) orally every 24 hours   Maalox Antacid [place] Regular Strength oral suspension: 10 milliliter(s) orally every 6 hours, As Needed dyspepsia Over the counter   sucralfate 1 g/10 mL oral suspension: 10 milliliter(s) orally every 6 hours  Zofran 8 mg oral tablet: 1 tab(s) orally every 8 hours, As Needed -for nausea   ZyrTEC 10 mg oral tablet: 1 tab(s) orally once a day, As Needed pre blood transfusion acetaminophen 325 mg oral tablet: 2 tab(s) orally every 6 hours, As needed, Temp greater or equal to 38C (100.4F), Mild Pain (1 - 3) over the counter   acyclovir 400 mg oral tablet: 1 tab(s) orally every 8 hours  cycloSPORINE 100 mg oral capsule: 4 cap(s) orally every 12 hours  Diflucan 200 mg oral tablet: 1 tab(s) orally once a day   eltrombopag 25 mg oral tablet: 6 tab(s) orally once a day  famotidine 20 mg oral tablet: 1 tab(s) orally 2 times a day  hydroCHLOROthiazide 25 mg oral tablet: 1 tab(s) orally once a day MDD:1  Levaquin 500 mg oral tablet: 1 tab(s) orally every 24 hours   Maalox Antacid [place] Regular Strength oral suspension: 10 milliliter(s) orally every 6 hours, As Needed dyspepsia Over the counter   sucralfate 1 g/10 mL oral suspension: 10 milliliter(s) orally every 6 hours  Zofran 8 mg oral tablet: 1 tab(s) orally every 8 hours, As Needed -for nausea   ZyrTEC 10 mg oral tablet: 1 tab(s) orally once a day, As Needed pre blood transfusion acyclovir 400 mg oral tablet: 1 tab(s) orally every 8 hours  calcium acetate 667 mg oral tablet: 2 tab(s) orally 2 times a day MDD:4 tabs  cycloSPORINE 100 mg oral capsule: 3 cap(s) orally every 12 hours  Diflucan 200 mg oral tablet: 1 tab(s) orally once a day   eltrombopag 25 mg oral tablet: 6 tab(s) orally once a day  famotidine 20 mg oral tablet: 1 tab(s) orally 2 times a day  hydroCHLOROthiazide 25 mg oral tablet: 1 tab(s) orally once a day MDD:1  Levaquin 500 mg oral tablet: 1 tab(s) orally every 24 hours   Maalox Antacid [place] Regular Strength oral suspension: 10 milliliter(s) orally every 6 hours, As Needed dyspepsia Over the counter   sucralfate 1 g/10 mL oral suspension: 10 milliliter(s) orally every 6 hours  Zofran 8 mg oral tablet: 1 tab(s) orally every 8 hours, As Needed -for nausea   ZyrTEC 10 mg oral tablet: 1 tab(s) orally once a day, As Needed pre blood transfusion

## 2020-04-19 NOTE — PROGRESS NOTE ADULT - PROBLEM SELECTOR PLAN 1
rule acute leukemia vs aplastic anemia  peripheral flow cytometry/FISH sent 4/18  Need Bone Marrow biopsy 4/20  ATRA order stopped in lieu of possible aplastic anemia  follow up CBC/TLS/DIC panel Q12  Strict I&Os, daily weighjts, mouth care  Allopurinol 300mg daily  MUGA/Echo pending  HLA typing to send out 4/20  Follow up Willy test, viral serologies CMV, EBV, Parvovirus  HIV/Hepatitis panel negative rule acute leukemia vs aplastic anemia  peripheral flow cytometry/FISH sent 4/18 -follow up results  Need Bone Marrow biopsy 4/20  ATRA order stopped in lieu of possible aplastic anemia  follow up CBC/TLS/DIC panel Q12  Strict I&Os, daily weighjts, mouth care  Allopurinol 300mg daily  MUGA/Echo pending  HLA typing to send out 4/20  Follow up Willy test, viral serologies CMV, EBV, Parvovirus  HIV/Hepatitis panel negative

## 2020-04-19 NOTE — DISCHARGE NOTE PROVIDER - CARE PROVIDER_API CALL
Goldberg, Bradley H (MD)  Hematology; Internal Medicine; Medical Oncology  [address] [place] [address]  Phone: [phone]  Fax: [phone]  Follow Up Time:

## 2020-04-20 ENCOUNTER — RESULT REVIEW (OUTPATIENT)
Age: 35
End: 2020-04-20

## 2020-04-20 LAB
ALBUMIN SERPL ELPH-MCNC: 3.9 G/DL — SIGNIFICANT CHANGE UP (ref 3.3–5)
ALBUMIN SERPL ELPH-MCNC: 4.1 G/DL — SIGNIFICANT CHANGE UP (ref 3.3–5)
ALP SERPL-CCNC: 70 U/L — SIGNIFICANT CHANGE UP (ref 40–120)
ALP SERPL-CCNC: 87 U/L — SIGNIFICANT CHANGE UP (ref 40–120)
ALT FLD-CCNC: 15 U/L — SIGNIFICANT CHANGE UP (ref 10–45)
ALT FLD-CCNC: 16 U/L — SIGNIFICANT CHANGE UP (ref 10–45)
ANION GAP SERPL CALC-SCNC: 12 MMOL/L — SIGNIFICANT CHANGE UP (ref 5–17)
ANION GAP SERPL CALC-SCNC: 15 MMOL/L — SIGNIFICANT CHANGE UP (ref 5–17)
APTT BLD: 26.8 SEC — LOW (ref 27.5–36.3)
APTT BLD: 29.6 SEC — SIGNIFICANT CHANGE UP (ref 27.5–36.3)
AST SERPL-CCNC: 8 U/L — LOW (ref 10–40)
AST SERPL-CCNC: 9 U/L — LOW (ref 10–40)
B19V IGG SER-ACNC: 0.7 INDEX — SIGNIFICANT CHANGE UP (ref 0–0.8)
B19V IGG+IGM SER-IMP: NEGATIVE — SIGNIFICANT CHANGE UP
B19V IGG+IGM SER-IMP: SIGNIFICANT CHANGE UP
B19V IGM FLD-ACNC: 0.1 INDEX — SIGNIFICANT CHANGE UP (ref 0–0.8)
B19V IGM SER-ACNC: NEGATIVE — SIGNIFICANT CHANGE UP
BASOPHILS # BLD AUTO: 0 K/UL — SIGNIFICANT CHANGE UP (ref 0–0.2)
BASOPHILS NFR BLD AUTO: 0 % — SIGNIFICANT CHANGE UP (ref 0–2)
BILIRUB SERPL-MCNC: 0.7 MG/DL — SIGNIFICANT CHANGE UP (ref 0.2–1.2)
BILIRUB SERPL-MCNC: 1.2 MG/DL — SIGNIFICANT CHANGE UP (ref 0.2–1.2)
BLD GP AB SCN SERPL QL: NEGATIVE — SIGNIFICANT CHANGE UP
BUN SERPL-MCNC: 14 MG/DL — SIGNIFICANT CHANGE UP (ref 7–23)
BUN SERPL-MCNC: 15 MG/DL — SIGNIFICANT CHANGE UP (ref 7–23)
CALCIUM SERPL-MCNC: 8.9 MG/DL — SIGNIFICANT CHANGE UP (ref 8.4–10.5)
CALCIUM SERPL-MCNC: 9.1 MG/DL — SIGNIFICANT CHANGE UP (ref 8.4–10.5)
CHLORIDE SERPL-SCNC: 103 MMOL/L — SIGNIFICANT CHANGE UP (ref 96–108)
CHLORIDE SERPL-SCNC: 105 MMOL/L — SIGNIFICANT CHANGE UP (ref 96–108)
CMV DNA CSF QL NAA+PROBE: SIGNIFICANT CHANGE UP
CO2 SERPL-SCNC: 21 MMOL/L — LOW (ref 22–31)
CO2 SERPL-SCNC: 22 MMOL/L — SIGNIFICANT CHANGE UP (ref 22–31)
CREAT SERPL-MCNC: 0.81 MG/DL — SIGNIFICANT CHANGE UP (ref 0.5–1.3)
CREAT SERPL-MCNC: 0.81 MG/DL — SIGNIFICANT CHANGE UP (ref 0.5–1.3)
D DIMER BLD IA.RAPID-MCNC: 531 NG/ML DDU — HIGH
D DIMER BLD IA.RAPID-MCNC: 680 NG/ML DDU — HIGH
EBV EA AB SER IA-ACNC: <5 U/ML — SIGNIFICANT CHANGE UP
EBV EA AB TITR SER IF: POSITIVE
EBV EA IGG SER-ACNC: NEGATIVE — SIGNIFICANT CHANGE UP
EBV NA IGG SER IA-ACNC: >600 U/ML — HIGH
EBV PATRN SPEC IB-IMP: SIGNIFICANT CHANGE UP
EBV VCA IGG AVIDITY SER QL IA: POSITIVE
EBV VCA IGM SER IA-ACNC: 382 U/ML — HIGH
EBV VCA IGM SER IA-ACNC: <10 U/ML — SIGNIFICANT CHANGE UP
EBV VCA IGM TITR FLD: NEGATIVE — SIGNIFICANT CHANGE UP
EOSINOPHIL # BLD AUTO: 0.01 K/UL — SIGNIFICANT CHANGE UP (ref 0–0.5)
EOSINOPHIL NFR BLD AUTO: 0.6 % — SIGNIFICANT CHANGE UP (ref 0–6)
FIBRINOGEN PPP-MCNC: 599 MG/DL — HIGH (ref 320–500)
FIBRINOGEN PPP-MCNC: 655 MG/DL — HIGH (ref 350–510)
GLUCOSE SERPL-MCNC: 144 MG/DL — HIGH (ref 70–99)
GLUCOSE SERPL-MCNC: 92 MG/DL — SIGNIFICANT CHANGE UP (ref 70–99)
HCT VFR BLD CALC: 16.7 % — CRITICAL LOW (ref 39–50)
HCT VFR BLD CALC: 21.2 % — LOW (ref 39–50)
HGB BLD-MCNC: 5.9 G/DL — CRITICAL LOW (ref 13–17)
HGB BLD-MCNC: 7.6 G/DL — LOW (ref 13–17)
INR BLD: 1.09 RATIO — SIGNIFICANT CHANGE UP (ref 0.88–1.16)
INR BLD: 1.17 RATIO — HIGH (ref 0.88–1.16)
LDH SERPL L TO P-CCNC: 106 U/L — SIGNIFICANT CHANGE UP (ref 50–242)
LDH SERPL L TO P-CCNC: 124 U/L — SIGNIFICANT CHANGE UP (ref 50–242)
LYMPHOCYTES # BLD AUTO: 1.62 K/UL — SIGNIFICANT CHANGE UP (ref 1–3.3)
LYMPHOCYTES # BLD AUTO: 91 % — HIGH (ref 13–44)
MAGNESIUM SERPL-MCNC: 2.2 MG/DL — SIGNIFICANT CHANGE UP (ref 1.6–2.6)
MAGNESIUM SERPL-MCNC: 2.3 MG/DL — SIGNIFICANT CHANGE UP (ref 1.6–2.6)
MCHC RBC-ENTMCNC: 31.1 PG — SIGNIFICANT CHANGE UP (ref 27–34)
MCHC RBC-ENTMCNC: 31.4 PG — SIGNIFICANT CHANGE UP (ref 27–34)
MCHC RBC-ENTMCNC: 35.3 GM/DL — SIGNIFICANT CHANGE UP (ref 32–36)
MCHC RBC-ENTMCNC: 35.8 GM/DL — SIGNIFICANT CHANGE UP (ref 32–36)
MCV RBC AUTO: 87.6 FL — SIGNIFICANT CHANGE UP (ref 80–100)
MCV RBC AUTO: 87.9 FL — SIGNIFICANT CHANGE UP (ref 80–100)
MONOCYTES # BLD AUTO: 0.06 K/UL — SIGNIFICANT CHANGE UP (ref 0–0.9)
MONOCYTES NFR BLD AUTO: 3.4 % — SIGNIFICANT CHANGE UP (ref 2–14)
NEUTROPHILS # BLD AUTO: 0.09 K/UL — LOW (ref 1.8–7.4)
NEUTROPHILS NFR BLD AUTO: 5 % — LOW (ref 43–77)
NRBC # BLD: 0 /100 WBCS — SIGNIFICANT CHANGE UP (ref 0–0)
NRBC # BLD: 1 /100 WBCS — HIGH (ref 0–0)
PHOSPHATE SERPL-MCNC: 3.9 MG/DL — SIGNIFICANT CHANGE UP (ref 2.5–4.5)
PHOSPHATE SERPL-MCNC: 4.3 MG/DL — SIGNIFICANT CHANGE UP (ref 2.5–4.5)
PLATELET # BLD AUTO: 62 K/UL — LOW (ref 150–400)
PLATELET # BLD AUTO: 67 K/UL — LOW (ref 150–400)
POTASSIUM SERPL-MCNC: 3.8 MMOL/L — SIGNIFICANT CHANGE UP (ref 3.5–5.3)
POTASSIUM SERPL-MCNC: 4.2 MMOL/L — SIGNIFICANT CHANGE UP (ref 3.5–5.3)
POTASSIUM SERPL-SCNC: 3.8 MMOL/L — SIGNIFICANT CHANGE UP (ref 3.5–5.3)
POTASSIUM SERPL-SCNC: 4.2 MMOL/L — SIGNIFICANT CHANGE UP (ref 3.5–5.3)
PROT SERPL-MCNC: 6.6 G/DL — SIGNIFICANT CHANGE UP (ref 6–8.3)
PROT SERPL-MCNC: 6.9 G/DL — SIGNIFICANT CHANGE UP (ref 6–8.3)
PROTHROM AB SERPL-ACNC: 12.5 SEC — SIGNIFICANT CHANGE UP (ref 10–12.9)
PROTHROM AB SERPL-ACNC: 13.5 SEC — HIGH (ref 10–12.9)
RBC # BLD: 1.9 M/UL — LOW (ref 4.2–5.8)
RBC # BLD: 2.42 M/UL — LOW (ref 4.2–5.8)
RBC # FLD: 12.5 % — SIGNIFICANT CHANGE UP (ref 10.3–14.5)
RBC # FLD: 12.7 % — SIGNIFICANT CHANGE UP (ref 10.3–14.5)
RH IG SCN BLD-IMP: POSITIVE — SIGNIFICANT CHANGE UP
SODIUM SERPL-SCNC: 139 MMOL/L — SIGNIFICANT CHANGE UP (ref 135–145)
SODIUM SERPL-SCNC: 139 MMOL/L — SIGNIFICANT CHANGE UP (ref 135–145)
TM INTERPRETATION: SIGNIFICANT CHANGE UP
URATE SERPL-MCNC: 2.3 MG/DL — LOW (ref 3.4–8.8)
URATE SERPL-MCNC: 2.7 MG/DL — LOW (ref 3.4–8.8)
WBC # BLD: 1.78 K/UL — LOW (ref 3.8–10.5)
WBC # BLD: 2.02 K/UL — LOW (ref 3.8–10.5)
WBC # FLD AUTO: 1.78 K/UL — LOW (ref 3.8–10.5)
WBC # FLD AUTO: 2.02 K/UL — LOW (ref 3.8–10.5)

## 2020-04-20 PROCEDURE — 88189 FLOWCYTOMETRY/READ 16 & >: CPT

## 2020-04-20 PROCEDURE — 88341 IMHCHEM/IMCYTCHM EA ADD ANTB: CPT | Mod: 26,59

## 2020-04-20 PROCEDURE — 99232 SBSQ HOSP IP/OBS MODERATE 35: CPT

## 2020-04-20 PROCEDURE — 88365 INSITU HYBRIDIZATION (FISH): CPT | Mod: 26

## 2020-04-20 PROCEDURE — 88360 TUMOR IMMUNOHISTOCHEM/MANUAL: CPT | Mod: 26

## 2020-04-20 PROCEDURE — 99231 SBSQ HOSP IP/OBS SF/LOW 25: CPT

## 2020-04-20 PROCEDURE — 78472 GATED HEART PLANAR SINGLE: CPT | Mod: 26

## 2020-04-20 PROCEDURE — 88342 IMHCHEM/IMCYTCHM 1ST ANTB: CPT | Mod: 26,59

## 2020-04-20 PROCEDURE — 88313 SPECIAL STAINS GROUP 2: CPT | Mod: 26

## 2020-04-20 PROCEDURE — 88305 TISSUE EXAM BY PATHOLOGIST: CPT | Mod: 26

## 2020-04-20 PROCEDURE — 38222 DX BONE MARROW BX & ASPIR: CPT | Mod: AS

## 2020-04-20 PROCEDURE — 93306 TTE W/DOPPLER COMPLETE: CPT | Mod: 26

## 2020-04-20 RX ORDER — POSACONAZOLE 100 MG/1
300 TABLET, DELAYED RELEASE ORAL DAILY
Refills: 0 | Status: DISCONTINUED | OUTPATIENT
Start: 2020-04-20 | End: 2020-04-29

## 2020-04-20 RX ADMIN — Medication 5 MILLILITER(S): at 07:49

## 2020-04-20 RX ADMIN — POSACONAZOLE 300 MILLIGRAM(S): 100 TABLET, DELAYED RELEASE ORAL at 12:36

## 2020-04-20 RX ADMIN — Medication 300 MILLIGRAM(S): at 12:35

## 2020-04-20 RX ADMIN — ONDANSETRON 4 MILLIGRAM(S): 8 TABLET, FILM COATED ORAL at 10:15

## 2020-04-20 RX ADMIN — SODIUM CHLORIDE 75 MILLILITER(S): 9 INJECTION INTRAMUSCULAR; INTRAVENOUS; SUBCUTANEOUS at 21:30

## 2020-04-20 RX ADMIN — Medication 5 MILLILITER(S): at 14:42

## 2020-04-20 RX ADMIN — Medication 5 MILLILITER(S): at 12:36

## 2020-04-20 RX ADMIN — Medication 5 MILLILITER(S): at 21:30

## 2020-04-20 NOTE — PROGRESS NOTE ADULT - ASSESSMENT
33 y/o Male with no PMH p/w dizziness in setting of 2 weeks of fevers/chills, melena and easy bruising, found to be pancytopenic with Hb 3.6 and platelets of 2,000, rule out acute leukemic process vs aplastic anemia. Patient has been transferred to [facility] from [facility] for further hematologic work-up. Patient has pancytopenia secondary to disease process.

## 2020-04-20 NOTE — PROGRESS NOTE ADULT - SUBJECTIVE AND OBJECTIVE BOX
Diagnosis: r/o acute leukemia vs. aplastic anemia    Protocol/Chemo Regimen: to be determined    Day: n/a     Pt endorsed: blurry vision, intermittent headaches; chest discomfort resolved    Review of Systems: Denies nausea, vomiting, diarrhea, chest pain, SOB     Pain scale: 0                                           Diet: regular    Allergies: No Known Allergies      =========== Diagnosis: r/o acute leukemia vs. aplastic anemia    Protocol/Chemo Regimen: to be determined    Day: n/a     Pt endorsed: blurry vision, intermittent headaches; chest discomfort resolved    Review of Systems: Denies nausea, vomiting, diarrhea, chest pain, SOB     Pain scale: 0                                           Diet: regular    Allergies: No Known Allergies    ANTIMICROBIALS  levoFLOXacin  Tablet 500 milliGRAM(s) Oral every 24 hours    STANDING MEDICATIONS  allopurinol 300 milliGRAM(s) Oral daily  Biotene Dry Mouth Oral Rinse 5 milliLiter(s) Swish and Spit five times a day  sodium chloride 0.9%. 1000 milliLiter(s) IV Continuous <Continuous>    PRN MEDICATIONS  acetaminophen   Tablet .. 650 milliGRAM(s) Oral every 6 hours PRN  ondansetron Injectable 4 milliGRAM(s) IV Push every 6 hours PRN    Vital Signs Last 24 Hrs  T(C): 37.5 (20 Apr 2020 05:20), Max: 37.5 (20 Apr 2020 05:20)  T(F): 99.5 (20 Apr 2020 05:20), Max: 99.5 (20 Apr 2020 05:20)  HR: 94 (20 Apr 2020 05:20) (91 - 97)  BP: 128/75 (20 Apr 2020 05:20) (109/68 - 144/81)  BP(mean): --  RR: 18 (20 Apr 2020 05:20) (18 - 18)  SpO2: 98% (20 Apr 2020 05:20) (97% - 100%)    PHYSICAL EXAM  General: adult in NAD  HEENT: clear oropharynx, no erythema, no ulcers  Neck: supple  CV: normal S1, S2, RRR  Lungs: clear to auscultation, no wheezes, no rales  Abdomen: soft, nontender, nondistended, normal BS  Ext: no edema  Skin: no rash  Neuro: alert and oriented x 3    LABS:                        5.9    1.78  )-----------( 67       ( 20 Apr 2020 07:18 )             16.7         Mean Cell Volume : 87.9 fl  Mean Cell Hemoglobin : 31.1 pg  Mean Cell Hemoglobin Concentration : 35.3 gm/dL  Auto Neutrophil # : x  Auto Lymphocyte # : x  Auto Monocyte # : x  Auto Eosinophil # : x  Auto Basophil # : x  Auto Neutrophil % : x  Auto Lymphocyte % : x  Auto Monocyte % : x  Auto Eosinophil % : x  Auto Basophil % : x    04-20  139  |  105  |  15  ----------------------------<  92  4.2   |  22  |  0.81    Ca    8.9      20 Apr 2020 07:16  Phos  4.3     04-20  Mg     2.3     04-20    TPro  6.6  /  Alb  3.9  /  TBili  0.7  /  DBili  x   /  AST  9<L>  /  ALT  16  /  AlkPhos  70  04-20    Mg 2.3  Phos 4.3  Mg 2.4  Phos 3.9    PT/INR - ( 20 Apr 2020 07:18 )   PT: 12.5 sec;   INR: 1.09 ratio      PTT - ( 20 Apr 2020 07:18 )  PTT:26.8 sec    Uric Acid 2.7    Uric Acid 2.3 Diagnosis: r/o acute leukemia vs. aplastic anemia    Protocol/Chemo Regimen: to be determined    Day: n/a     Pt endorsed: blurry vision     Review of Systems: Denies nausea, vomiting, diarrhea, chest pain, SOB     Pain scale: 0                                           Diet: regular    Allergies: No Known Allergies    ANTIMICROBIALS  levoFLOXacin  Tablet 500 milliGRAM(s) Oral every 24 hours    STANDING MEDICATIONS  allopurinol 300 milliGRAM(s) Oral daily  Biotene Dry Mouth Oral Rinse 5 milliLiter(s) Swish and Spit five times a day  sodium chloride 0.9%. 1000 milliLiter(s) IV Continuous <Continuous>    PRN MEDICATIONS  acetaminophen   Tablet .. 650 milliGRAM(s) Oral every 6 hours PRN  ondansetron Injectable 4 milliGRAM(s) IV Push every 6 hours PRN    Vital Signs Last 24 Hrs  T(C): 37.5 (20 Apr 2020 05:20), Max: 37.5 (20 Apr 2020 05:20)  T(F): 99.5 (20 Apr 2020 05:20), Max: 99.5 (20 Apr 2020 05:20)  HR: 94 (20 Apr 2020 05:20) (91 - 97)  BP: 128/75 (20 Apr 2020 05:20) (109/68 - 144/81)  BP(mean): --  RR: 18 (20 Apr 2020 05:20) (18 - 18)  SpO2: 98% (20 Apr 2020 05:20) (97% - 100%)    PHYSICAL EXAM  General: adult in NAD  HEENT: clear oropharynx, no erythema, no ulcers  Neck: supple  CV: normal S1, S2, RRR  Lungs: clear to auscultation, no wheezes, no rales  Abdomen: soft, nontender, nondistended, normal BS  Ext: no edema  Skin: no rash  Neuro: alert and oriented x 3    LABS:                        5.9    1.78  )-----------( 67       ( 20 Apr 2020 07:18 )             16.7         Mean Cell Volume : 87.9 fl  Mean Cell Hemoglobin : 31.1 pg  Mean Cell Hemoglobin Concentration : 35.3 gm/dL  Auto Neutrophil # : x  Auto Lymphocyte # : x  Auto Monocyte # : x  Auto Eosinophil # : x  Auto Basophil # : x  Auto Neutrophil % : x  Auto Lymphocyte % : x  Auto Monocyte % : x  Auto Eosinophil % : x  Auto Basophil % : x    04-20  139  |  105  |  15  ----------------------------<  92  4.2   |  22  |  0.81    Ca    8.9      20 Apr 2020 07:16  Phos  4.3     04-20  Mg     2.3     04-20    TPro  6.6  /  Alb  3.9  /  TBili  0.7  /  DBili  x   /  AST  9<L>  /  ALT  16  /  AlkPhos  70  04-20    Mg 2.3  Phos 4.3  Mg 2.4  Phos 3.9    PT/INR - ( 20 Apr 2020 07:18 )   PT: 12.5 sec;   INR: 1.09 ratio      PTT - ( 20 Apr 2020 07:18 )  PTT:26.8 sec    Uric Acid 2.7    Uric Acid 2.3 Diagnosis: r/o acute leukemia vs. aplastic anemia    Protocol/Chemo Regimen: to be determined    Day: n/a     Pt endorsed: blurry vision, gum mucosal bleeding this morning     Review of Systems: Denies nausea, vomiting, diarrhea, chest pain, SOB     Pain scale: 0                                           Diet: regular    Allergies: No Known Allergies    ANTIMICROBIALS  levoFLOXacin  Tablet 500 milliGRAM(s) Oral every 24 hours    STANDING MEDICATIONS  allopurinol 300 milliGRAM(s) Oral daily  Biotene Dry Mouth Oral Rinse 5 milliLiter(s) Swish and Spit five times a day  sodium chloride 0.9%. 1000 milliLiter(s) IV Continuous <Continuous>    PRN MEDICATIONS  acetaminophen   Tablet .. 650 milliGRAM(s) Oral every 6 hours PRN  ondansetron Injectable 4 milliGRAM(s) IV Push every 6 hours PRN    Vital Signs Last 24 Hrs  T(C): 37.5 (20 Apr 2020 05:20), Max: 37.5 (20 Apr 2020 05:20)  T(F): 99.5 (20 Apr 2020 05:20), Max: 99.5 (20 Apr 2020 05:20)  HR: 94 (20 Apr 2020 05:20) (91 - 97)  BP: 128/75 (20 Apr 2020 05:20) (109/68 - 144/81)  BP(mean): --  RR: 18 (20 Apr 2020 05:20) (18 - 18)  SpO2: 98% (20 Apr 2020 05:20) (97% - 100%)    PHYSICAL EXAM  General: adult in NAD  HEENT: clear oropharynx, no erythema, no ulcers  Neck: supple  CV: normal S1, S2, RRR  Lungs: clear to auscultation, no wheezes, no rales  Abdomen: soft, nontender, nondistended, normal BS  Ext: no edema  Skin: no rash  Neuro: alert and oriented x 3    LABS:                        5.9    1.78  )-----------( 67       ( 20 Apr 2020 07:18 )             16.7         Mean Cell Volume : 87.9 fl  Mean Cell Hemoglobin : 31.1 pg  Mean Cell Hemoglobin Concentration : 35.3 gm/dL  Auto Neutrophil # : x  Auto Lymphocyte # : x  Auto Monocyte # : x  Auto Eosinophil # : x  Auto Basophil # : x  Auto Neutrophil % : x  Auto Lymphocyte % : x  Auto Monocyte % : x  Auto Eosinophil % : x  Auto Basophil % : x    04-20  139  |  105  |  15  ----------------------------<  92  4.2   |  22  |  0.81    Ca    8.9      20 Apr 2020 07:16  Phos  4.3     04-20  Mg     2.3     04-20    TPro  6.6  /  Alb  3.9  /  TBili  0.7  /  DBili  x   /  AST  9<L>  /  ALT  16  /  AlkPhos  70  04-20    Mg 2.3  Phos 4.3  Mg 2.4  Phos 3.9    PT/INR - ( 20 Apr 2020 07:18 )   PT: 12.5 sec;   INR: 1.09 ratio      PTT - ( 20 Apr 2020 07:18 )  PTT:26.8 sec    Uric Acid 2.7    Uric Acid 2.3

## 2020-04-20 NOTE — PROGRESS NOTE ADULT - PROBLEM SELECTOR PLAN 1
Rule acute leukemia vs aplastic anemia  Peripheral flow cytometry/FISH sent 4/18 -follow up results  Will need Bone Marrow biopsy 4/20  Follow up CBC/TLS/DIC panel Q12  Strict I&Os, daily weights, mouth care  Continue Allopurinol 300mg PO daily  MUGA/Echo pending to evaluate EF prior to chemothearpy   HLA typing to send out 4/20  Follow up Willy test, viral serologies CMV, EBV, Parvovirus  HIV/Hepatitis panel negative Rule acute leukemia vs aplastic anemia  Peripheral flow cytometry/FISH sent 4/18 -follow up results  Will need Bone Marrow biopsy 4/20  Follow up CBC/TLS/DIC panel Q12  Strict I&Os, daily weights, mouth care  Continue Allopurinol 300mg PO daily  MUGA/Echo pending to evaluate EF prior to chemothearpy   HLA typing to send out 4/20  Follow up Willy test, viral serologies CMV, EBV, Parvovirus  HIV/Hepatitis panel negative  - Anemia: PRBCs 1 unit today Rule acute leukemia vs aplastic anemia  Peripheral flow cytometry/FISH sent 4/18 - follow up results  Follow up Bone Marrow biopsy results - performed 4/20   Follow up CBC/TLS/DIC panel Q12  Strict I&Os, daily weights, mouth care  Continue Allopurinol 300mg PO daily  HLA typing to send out 4/20  Follow up Willy test, viral serologies CMV, EBV, Parvovirus  HIV/Hepatitis panel negative  Offered sperm banking to patient prior to possible chemotherapy treatment   Information given to patient regarding   - Anemia: PRBCs 1 unit today Rule acute leukemia vs aplastic anemia  Peripheral flow cytometry/FISH sent 4/18 - follow up results  Follow up Bone Marrow biopsy results - performed 4/20   Follow up CBC/TLS/DIC panel Q12  Strict I&Os, daily weights, mouth care  Continue Allopurinol 300mg PO daily  HLA typing to send out 4/20  Follow up Willy test, viral serologies CMV, EBV, Parvovirus  HIV/Hepatitis panel negative  Offered sperm banking to patient prior to possible chemotherapy treatment regimen. D/w urologist Dr. Maximo England office   - Anemia: PRBCs 1 unit today Rule acute leukemia vs aplastic anemia  Peripheral flow cytometry/FISH sent 4/18 - follow up results  Follow up Bone Marrow biopsy results - performed 4/20   Follow up CBC/TLS/DIC labs panel Q12  Strict I&Os, daily weights, mouth care  Continue Allopurinol 300mg PO daily  HLA typing to send out 4/20  Follow up Willy test, viral serologies CMV, EBV, Parvovirus  HIV/Hepatitis panel negative  Offered sperm banking to patient prior to possible chemotherapy treatment regimen. D/w urologist Dr. Maximo England office. Tentatively scheduled for Wednesday am (4/22)   - Anemia: PRBCs 1 unit today

## 2020-04-20 NOTE — CONSULT NOTE ADULT - ASSESSMENT
Assessment:   34M with no PMH presented to OSH for c/o dizziness and feeling unwell, transferred to [facility] for further hematologic evaluation. Leukemia vs aplastic anemia.   Complained blurred vision for 2-3 days OU  Exam showed intraretinal hemorrhage, Bravo spots and cotton wool spots, which supports anemia retinopathy.   (Thrombocytopenia alone, even severe, is rarely sufficient to cause retinal hemorrhage. However, thrombocytopenia combined with anemia is a known risk factor)  - no emergent intervention from ophthalmology. In most cases, only treatment of the underline etiology is needed, retinopathy will resolve on its own.   - management of the underline anemia and thrombocytopenia per primary team  - findings were discussed with the patient. Patient was instructed to tell the team if any symptoms are getting worse.   - patient needs to follow up in the clinic after being discharge within one week for further evaluation and may need some tests, such as OCT, FA.     S/D/W Dr. Owens  Follow-Up:  Patient should follow up his/her ophthalmologist or in the [facility] within one week  [address] [facility]. [address] [place] [id number]  [phone]

## 2020-04-20 NOTE — PROGRESS NOTE ADULT - PROBLEM SELECTOR PLAN 3
Suspect retinal hemorrhage from thrombocytopenia  Transfuse to keep PLTs above 50k for now  Appreciate Opthalmology consult Suspect retinal hemorrhage from thrombocytopenia  Transfuse to keep PLTs above 50k for now  Appreciate Opthalmology consultation

## 2020-04-20 NOTE — CHART NOTE - NSCHARTNOTEFT_GEN_A_CORE
Hematology/Oncology Procedure Note    Bone Marrow Aspiration/Biopsy    Indication:    Bone marrow aspiration and biopsy procedure description, risks, and benefits were discussed in detail with the patient.  All questions were answered.  Informed consent was obtained and time-out performed.      The area of the left  posterior iliac crest was prepped and draped using sterile technique. Local anesthetic with  2% Lidocaine.    Bone marrow aspiration and biopsy  was performed using sterile technique  by myself. Specimens were obtained.    The procedure was well tolerated and no local bleeding or other complications were observed.  Pressure was applied to the procedure site and a wound dressing was placed.  The patient and nursing staff were advised that the patient is to lie flat for 30 minutes post procedure. Tylenol may be used if no contraindications for pain at the procedure site.

## 2020-04-20 NOTE — PROGRESS NOTE ADULT - PROBLEM SELECTOR PLAN 2
Patient is neutropenic, afebrile  Continue Levaquin for prophylaxis  If febrile, panculture and change Levaquin to Cefepime Patient is neutropenic, afebrile  Continue Levaquin and posaconazole for prophylaxis  If febrile, panculture and change Levaquin to Cefepime

## 2020-04-20 NOTE — CONSULT NOTE ADULT - SUBJECTIVE AND OBJECTIVE BOX
34M with no PMH presented to OSH for c/o dizziness and feeling unwell, transferred to [facility] for further hematologic evaluation. Per pt, initially started noticing scattered bruising throughout his body (mostly thighs and arms) about 6 weeks ago; followed by one episode of epistaxis that lasted an hour, which pt has never had before. In the past 2 weeks, pt has also started noticing intermittent fevers and chills, responsive to tylenol. Has also started noticing very dark stools for the same time period and within the past week has started noticing spontaneous gum bleeding and endorsing severe fatigue and SOB with exertion. Also noted 15 lb weight loss, partially intentional over past 2-3 months. Pt with worsening dizziness in the past few days and inability to concentrate or think clearly, precipitating ED visit. +low grade headache loacted in posterior aspect of his head, denies sore throat, cough, +nausea but no vomiting, no abdominal pain, no diarrhea, no BRBPR, no urinary symptoms, no LE swelling, +visual changes.    CBC at [facility] showed pancytopenia with Wbc 3.29, lymphocyte predominant, Hgb 3.6, plt 2for acute leukemia. Transferred to [facility] further management. Pt received PRBC x2 and plt x1 at [facility]. Resquested to give a dose of ATRA at [facility].      Allergies  No Known Allergies    PAST MEDICAL & SURGICAL HISTORY:  Redundant prepuce and phimosis  Asthma, stable  H/O circumcision      FAMILY HISTORY:  No pertinent family history in first degree relatives      Social History:   former social smoker, quit 5 y ago; social etoh, no drugs, no vaping   lives with roommate    REVIEW OF SYSTEMS:    CONSTITUTIONAL: + fevers, +fatigue or chills  EYES/ENT: No visual changes, no throat pain   RESPIRATORY: No cough, wheezing, hemoptysis; + shortness of breath  CARDIOVASCULAR: No chest pain or palpitations  GASTROINTESTINAL: + nausea, No vomiting, or hematemesis; No diarrhea or constipation. +melena.  GENITOURINARY: No dysuria, frequency or hematuria  MUSCULOSKELETAL: No joint pain.  NEUROLOGICAL: No dizziness, numbness, or weakness  Hematologic: +gum bleeding, + easy bruising  SKIN: + easy bruising. No itching, burning, rashes, or lesions   All other review of systems is negative unless indicated above.    VITAL SIGNS:  Vital Signs Last 24 Hrs  T(C): 36.1 (18 Apr 2020 06:27), Max: 37.7 (17 Apr 2020 14:28)  T(F): 97 (18 Apr 2020 06:27), Max: 99.8 (17 Apr 2020 14:28)  HR: 101 (18 Apr 2020 06:27) (98 - 140)  BP: 100/64 (18 Apr 2020 06:27) (100/64 - 145/85)  BP(mean): --  RR: 16 (18 Apr 2020 06:27) (16 - 20)  SpO2: 99% (18 Apr 2020 06:27) (97% - 100%)      PHYSICAL EXAM:     GENERAL: no acute distress  HEENT: EOMI, neck supple  RESPIRATORY: LCTAB/L, no rhonchi, rales, or wheezing  CARDIOVASCULAR: RRR, no murmurs, gallops, rubs  ABDOMINAL: soft, non-tender, non-distended, positive bowel sounds   EXTREMITIES: no clubbing, cyanosis, or edema  NEUROLOGICAL: alert and oriented x 3, non-focal  SKIN: no rashes or lesions   MUSCULOSKELETAL: no gross joint deformity                          3.6    3.29  )-----------( 2        ( 17 Apr 2020 14:39 )             9.9      04-17    136  |  99  |  17.0  ----------------------------<  136<H>  4.0   |  21.0<L>  |  0.90    Ca    9.3      17 Apr 2020 14:39  Phos  3.0     04-17  Mg     2.3     04-17    TPro  7.4  /  Alb  4.3  /  TBili  0.5  /  DBili  x   /  AST  9   /  ALT  19  /  AlkPhos  77  04-17      MEDICATIONS  (STANDING):
[facility] Ophthalmology Consult Note    HPI: 34M with no PMH presented to OSH for c/o dizziness and feeling unwell, transferred to [facility] for further hematologic evaluation. Per pt, initially started noticing scattered bruising throughout his body (mostly thighs and arms) about 6 weeks ago; followed by one episode of epistaxis that lasted an hour, which pt has never had before. In the past 2 weeks, pt has also started noticing intermittent fevers and chills, responsive to tylenol. Has also started noticing very dark stools for the same time period and within the past week has started noticing spontaneous gum bleeding and endorsing severe fatigue and SOB with exertion. Also noted 15 lb weight loss, partially intentional over past 2-3 months. Pt with worsening dizziness in the past few days and inability to concentrate or think clearly, precipitating ED visit. +low grade headache loacted in posterior aspect of his head, denies sore throat, cough, +nausea but no vomiting, no abdominal pain, no diarrhea, no BRBPR, no urinary symptoms, no LE swelling, +visual changes  Ophthalmology: patient stated the blurry vision started about 2-3 days ago. He denied floaters or flashes. Denied shadows. He stated when he read the chart, some parts in the central vision are a little bit gray.     PMH:   Redundant prepuce and phimosis  Asthma, stable    H/O circumcision  No significant past surgical history    Meds:   acetaminophen   Tablet .. 650 milliGRAM(s) Oral every 6 hours PRN  allopurinol 300 milliGRAM(s) Oral daily  Biotene Dry Mouth Oral Rinse 5 milliLiter(s) Swish and Spit five times a day  levoFLOXacin  Tablet 500 milliGRAM(s) Oral every 24 hours  ondansetron Injectable 4 milliGRAM(s) IV Push every 6 hours PRN  posaconazole DR Tablet 300 milliGRAM(s) Oral daily  sodium chloride 0.9%. 1000 milliLiter(s) IV Continuous <Continuous>    POcHx (including surgeries/lasers/trauma):  denied  Drops: no  FamHx: No pertinent family history in first degree relatives  No glaucoma or other inherited eye problem  Social history: no smoke or drink  Allergies: No Known Allergies      Mood and Affect Appropriate ( x ),  Oriented to Time, Place, and Person x 3 ( x )  T(C): 36.9 (04-20-20 @ 15:40), Max: 37.5 (04-20-20 @ 05:20)  HR: 92 (04-20-20 @ 15:40) (87 - 99)  BP: 106/65 (04-20-20 @ 15:40) (106/65 - 149/86)  RR: 18 (04-20-20 @ 15:40) (18 - 18)  SpO2: 99% (04-20-20 @ 15:40) (97% - 100%)    Ophthalmology Exam    Visual acuity (sc): 20/30 OD; 20/25 os  Pupils: PERRL OU, no APD  Ttono: 12/13 OU,   Extraocular movements (EOMs): grossly full OU,   Confrontational Visual Field (CVF):  FULL OU    Pen light exam  External: wnl   Lids/Lashes/Lacrimal Ducts: Flat OU  Sclera/Conjunctiva:  W+Q OU  Cornea: Cl OU  Anterior Chamber: D+F OU   Iris:  Flat OU  Lens:  Cl OU    Fundus Exam: dilated with 1% tropicamide and 2.5% phenylephrine  Approval obtained from primary team for dilation  Patient aware that pupils can remained dilated for at least 4-6 hours  Exam performed with 20D lens    Vitreous: wnl, clear, OU  Disc, cup/disc: C/D wnl; flame-shape intraretinal hemorrhage on the inferior edge of the optic nerve, white spot in the center of the hemorrhage, OU  Macula:  wnl OU  Vessels:  cotton wool spots along the hemorrhage. OU  Periphery: wnl OU    Diagnostic Testing:  Complete Blood Count + Automated Diff (04.20.20 @ 18:05)    WBC Count: 2.02 K/uL    RBC Count: 2.42 M/uL    Hemoglobin: 7.6 g/dL    Hematocrit: 21.2 %    Mean Cell Volume: 87.6 fl    Mean Cell Hemoglobin: 31.4 pg    Mean Cell Hemoglobin Conc: 35.8 gm/dL    Red Cell Distrib Width: 12.5 %    Platelet Count - Automated: 62 K/uL    Nucleated RBC: 0 /100 WBCs

## 2020-04-20 NOTE — PROGRESS NOTE ADULT - ATTENDING COMMENTS
34M with no PMH presented to OSH with dizziness x 2 weeks, easy bruising x 2-4 weeks. At home had a syncopal event and called 911, found to have hemoglobin 3s, Platelets 2K, no evidence of DIC. Noted mild petechiae and has had gum bleeding. These have improved. Noted dark stools for 2 weeks. Peripheral smear with immature forms concerning for acute leukemia vs. aplastic anemia   -peripheral flow without blast population, FISH pending   -will continue ATRA 50 mg BID until PML-CHANTELLE negative   -CBC, TLS, DIC labs q 12h   -start allopurinol   -transfuse hb< 7, Plt <50k   -optho consult given hazy vision, suspect rentinal bleed given low platelets   -monitor for s/s of bleeding, gum bleeding resolved   -will need bone marrow biopsy on Monday 34M with no PMH presented to OSH with dizziness x 2 weeks, easy bruising x 2-4 weeks. At home had a syncopal event and called 911, found to have hemoglobin 3s, Platelets 2K, no evidence of DIC. Noted mild petechiae and has had gum bleeding. These have improved. Noted dark stools for 2 weeks. Peripheral smear with immature forms concerning for acute leukemia vs. aplastic anemia   -peripheral flow without blast population, FISH pending   -receiving ATRA 50 mg BID until PML-CHANTELLE negative   -CBC, TLS, DIC labs q 12h   -cont allopurinol, IVF, mouth care  -receiving transfusions if Hgb< 7, Plt <50k   -optho consult given hazy vision, suspect retinal bleed given low platelets   -monitor for s/s of bleeding, gum bleeding intermittent   -bone marrow biopsy today 34M with no PMH presented to OSH with dizziness x 2 weeks, easy bruising x 2-4 weeks. At home had a syncopal event and called 911, found to have hemoglobin 3s, Platelets 2K, no evidence of DIC. Noted mild petechiae and has had gum bleeding. These have improved. Noted dark stools for 2 weeks. Peripheral smear with immature forms concerning for acute leukemia vs. aplastic anemia   -peripheral flow without blast population, FISH pending   -receiving ATRA 50 mg BID until PML-CHANTELLE negative   -CBC, TLS, DIC labs q 12h   -cont allopurinol, IVF, mouth care  -receiving transfusions if Hgb< 7, Plt <50k   -optho consult given hazy vision, suspect retinal bleed given low platelets   -monitor for s/s of bleeding, gum bleeding intermittent   -bone marrow biopsy today  -Paranasal sinusitis on CT head(4/17)- cont Levaquin, begin Posaconazole

## 2020-04-21 LAB
ALBUMIN SERPL ELPH-MCNC: 3.7 G/DL — SIGNIFICANT CHANGE UP (ref 3.3–5)
ALBUMIN SERPL ELPH-MCNC: 4.1 G/DL — SIGNIFICANT CHANGE UP (ref 3.3–5)
ALP SERPL-CCNC: 72 U/L — SIGNIFICANT CHANGE UP (ref 40–120)
ALP SERPL-CCNC: 92 U/L — SIGNIFICANT CHANGE UP (ref 40–120)
ALT FLD-CCNC: 17 U/L — SIGNIFICANT CHANGE UP (ref 10–45)
ALT FLD-CCNC: 19 U/L — SIGNIFICANT CHANGE UP (ref 10–45)
ANION GAP SERPL CALC-SCNC: 12 MMOL/L — SIGNIFICANT CHANGE UP (ref 5–17)
ANION GAP SERPL CALC-SCNC: 14 MMOL/L — SIGNIFICANT CHANGE UP (ref 5–17)
APTT BLD: 26.6 SEC — LOW (ref 27.5–36.3)
APTT BLD: 28 SEC — SIGNIFICANT CHANGE UP (ref 27.5–36.3)
AST SERPL-CCNC: 9 U/L — LOW (ref 10–40)
AST SERPL-CCNC: 9 U/L — LOW (ref 10–40)
B19V DNA FLD QL NAA+PROBE: SIGNIFICANT CHANGE UP IU/ML
BASOPHILS # BLD AUTO: 0 K/UL — SIGNIFICANT CHANGE UP (ref 0–0.2)
BASOPHILS NFR BLD AUTO: 0 % — SIGNIFICANT CHANGE UP (ref 0–2)
BILIRUB SERPL-MCNC: 1.1 MG/DL — SIGNIFICANT CHANGE UP (ref 0.2–1.2)
BILIRUB SERPL-MCNC: 1.5 MG/DL — HIGH (ref 0.2–1.2)
BUN SERPL-MCNC: 14 MG/DL — SIGNIFICANT CHANGE UP (ref 7–23)
BUN SERPL-MCNC: 16 MG/DL — SIGNIFICANT CHANGE UP (ref 7–23)
CALCIUM SERPL-MCNC: 9 MG/DL — SIGNIFICANT CHANGE UP (ref 8.4–10.5)
CALCIUM SERPL-MCNC: 9.1 MG/DL — SIGNIFICANT CHANGE UP (ref 8.4–10.5)
CHLORIDE SERPL-SCNC: 104 MMOL/L — SIGNIFICANT CHANGE UP (ref 96–108)
CHLORIDE SERPL-SCNC: 106 MMOL/L — SIGNIFICANT CHANGE UP (ref 96–108)
CO2 SERPL-SCNC: 20 MMOL/L — LOW (ref 22–31)
CO2 SERPL-SCNC: 21 MMOL/L — LOW (ref 22–31)
CREAT SERPL-MCNC: 0.84 MG/DL — SIGNIFICANT CHANGE UP (ref 0.5–1.3)
CREAT SERPL-MCNC: 0.9 MG/DL — SIGNIFICANT CHANGE UP (ref 0.5–1.3)
D DIMER BLD IA.RAPID-MCNC: 341 NG/ML DDU — HIGH
D DIMER BLD IA.RAPID-MCNC: 341 NG/ML DDU — HIGH
EOSINOPHIL # BLD AUTO: 0 K/UL — SIGNIFICANT CHANGE UP (ref 0–0.5)
EOSINOPHIL NFR BLD AUTO: 0 % — SIGNIFICANT CHANGE UP (ref 0–6)
FIBRINOGEN PPP-MCNC: 577 MG/DL — HIGH (ref 320–500)
FIBRINOGEN PPP-MCNC: 645 MG/DL — HIGH (ref 350–510)
G6PD RBC-CCNC: 10.6 U/G HGB — SIGNIFICANT CHANGE UP (ref 7–20.5)
GLUCOSE SERPL-MCNC: 100 MG/DL — HIGH (ref 70–99)
GLUCOSE SERPL-MCNC: 93 MG/DL — SIGNIFICANT CHANGE UP (ref 70–99)
HCT VFR BLD CALC: 17.6 % — CRITICAL LOW (ref 39–50)
HCT VFR BLD CALC: 22.4 % — LOW (ref 39–50)
HCT VFR BLD CALC: 22.5 % — LOW (ref 39–50)
HGB BLD-MCNC: 6.5 G/DL — CRITICAL LOW (ref 13–17)
HGB BLD-MCNC: 8.1 G/DL — LOW (ref 13–17)
HLX FLT3 FINAL REPORT: SIGNIFICANT CHANGE UP
INR BLD: 1.17 RATIO — HIGH (ref 0.88–1.16)
INR BLD: 1.19 RATIO — HIGH (ref 0.88–1.16)
LDH SERPL L TO P-CCNC: 103 U/L — SIGNIFICANT CHANGE UP (ref 50–242)
LDH SERPL L TO P-CCNC: 128 U/L — SIGNIFICANT CHANGE UP (ref 50–242)
LYMPHOCYTES # BLD AUTO: 1.67 K/UL — SIGNIFICANT CHANGE UP (ref 1–3.3)
LYMPHOCYTES # BLD AUTO: 96 % — HIGH (ref 13–44)
MAGNESIUM SERPL-MCNC: 2.3 MG/DL — SIGNIFICANT CHANGE UP (ref 1.6–2.6)
MAGNESIUM SERPL-MCNC: 2.3 MG/DL — SIGNIFICANT CHANGE UP (ref 1.6–2.6)
MCHC RBC-ENTMCNC: 30.7 PG — SIGNIFICANT CHANGE UP (ref 27–34)
MCHC RBC-ENTMCNC: 32 PG — SIGNIFICANT CHANGE UP (ref 27–34)
MCHC RBC-ENTMCNC: 36 GM/DL — SIGNIFICANT CHANGE UP (ref 32–36)
MCHC RBC-ENTMCNC: 36.9 GM/DL — HIGH (ref 32–36)
MCV RBC AUTO: 85.2 FL — SIGNIFICANT CHANGE UP (ref 80–100)
MCV RBC AUTO: 86.7 FL — SIGNIFICANT CHANGE UP (ref 80–100)
MONOCYTES # BLD AUTO: 0.07 K/UL — SIGNIFICANT CHANGE UP (ref 0–0.9)
MONOCYTES NFR BLD AUTO: 4 % — SIGNIFICANT CHANGE UP (ref 2–14)
NEUTROPHILS # BLD AUTO: 0 K/UL — LOW (ref 1.8–7.4)
NEUTROPHILS NFR BLD AUTO: 0 % — LOW (ref 43–77)
NRBC # BLD: 0 /100 WBCS — SIGNIFICANT CHANGE UP (ref 0–0)
PHOSPHATE SERPL-MCNC: 3.9 MG/DL — SIGNIFICANT CHANGE UP (ref 2.5–4.5)
PHOSPHATE SERPL-MCNC: 4.4 MG/DL — SIGNIFICANT CHANGE UP (ref 2.5–4.5)
PLATELET # BLD AUTO: 46 K/UL — LOW (ref 150–400)
PLATELET # BLD AUTO: 53 K/UL — LOW (ref 150–400)
POTASSIUM SERPL-MCNC: 4.2 MMOL/L — SIGNIFICANT CHANGE UP (ref 3.5–5.3)
POTASSIUM SERPL-MCNC: 4.2 MMOL/L — SIGNIFICANT CHANGE UP (ref 3.5–5.3)
POTASSIUM SERPL-SCNC: 4.2 MMOL/L — SIGNIFICANT CHANGE UP (ref 3.5–5.3)
POTASSIUM SERPL-SCNC: 4.2 MMOL/L — SIGNIFICANT CHANGE UP (ref 3.5–5.3)
PROT SERPL-MCNC: 6.5 G/DL — SIGNIFICANT CHANGE UP (ref 6–8.3)
PROT SERPL-MCNC: 7.2 G/DL — SIGNIFICANT CHANGE UP (ref 6–8.3)
PROTHROM AB SERPL-ACNC: 13.5 SEC — HIGH (ref 10–12.9)
PROTHROM AB SERPL-ACNC: 13.6 SEC — HIGH (ref 10–12.9)
RBC # BLD: 2.03 M/UL — LOW (ref 4.2–5.8)
RBC # BLD: 2.64 M/UL — LOW (ref 4.2–5.8)
RBC # FLD: 12.6 % — SIGNIFICANT CHANGE UP (ref 10.3–14.5)
RBC # FLD: 13.3 % — SIGNIFICANT CHANGE UP (ref 10.3–14.5)
SODIUM SERPL-SCNC: 138 MMOL/L — SIGNIFICANT CHANGE UP (ref 135–145)
SODIUM SERPL-SCNC: 139 MMOL/L — SIGNIFICANT CHANGE UP (ref 135–145)
TM INTERPRETATION: SIGNIFICANT CHANGE UP
URATE SERPL-MCNC: 2.5 MG/DL — LOW (ref 3.4–8.8)
URATE SERPL-MCNC: 2.8 MG/DL — LOW (ref 3.4–8.8)
VIT B12 SERPL-MCNC: 325 PG/ML — SIGNIFICANT CHANGE UP (ref 232–1245)
WBC # BLD: 1.73 K/UL — LOW (ref 3.8–10.5)
WBC # BLD: 1.74 K/UL — LOW (ref 3.8–10.5)
WBC # FLD AUTO: 1.73 K/UL — LOW (ref 3.8–10.5)
WBC # FLD AUTO: 1.74 K/UL — LOW (ref 3.8–10.5)

## 2020-04-21 PROCEDURE — 99232 SBSQ HOSP IP/OBS MODERATE 35: CPT

## 2020-04-21 RX ADMIN — Medication 5 MILLILITER(S): at 21:23

## 2020-04-21 RX ADMIN — POSACONAZOLE 300 MILLIGRAM(S): 100 TABLET, DELAYED RELEASE ORAL at 12:13

## 2020-04-21 RX ADMIN — Medication 5 MILLILITER(S): at 00:42

## 2020-04-21 RX ADMIN — Medication 5 MILLILITER(S): at 15:56

## 2020-04-21 RX ADMIN — Medication 5 MILLILITER(S): at 08:44

## 2020-04-21 RX ADMIN — Medication 5 MILLILITER(S): at 12:13

## 2020-04-21 RX ADMIN — Medication 300 MILLIGRAM(S): at 12:13

## 2020-04-21 NOTE — PROGRESS NOTE ADULT - PROBLEM SELECTOR PLAN 1
Rule acute leukemia vs aplastic anemia  Peripheral flow cytometry/FISH sent 4/18 - follow up results  Follow up Bone Marrow biopsy results - performed 4/20   Follow up CBC/TLS/DIC labs panel Q12  Strict I&Os, daily weights, mouth care  Continue Allopurinol 300mg PO daily  HLA typing to send out 4/20  Follow up Willy test, viral serologies CMV, EBV, Parvovirus  HIV/Hepatitis panel negative  Offered sperm banking to patient prior to possible chemotherapy treatment regimen. D/w urologist Dr. Maximo England office. Tentatively scheduled for Wednesday am (4/22)   - Anemia: PRBCs 1 unit today Rule acute leukemia vs aplastic anemia  Peripheral flow cytometry/FISH sent 4/18 - follow up results  Follow up Bone Marrow biopsy results - performed 4/20   Follow up CBC/TLS/DIC labs panel Q12  Strict I&Os, daily weights, mouth care  Continue Allopurinol 300mg PO daily  HLA typing to send out 4/20  Follow up Willy test,   CMV (-)  EBV, Parvovirus  HIV/Hepatitis panel negative  Sperm banking to patient prior to possible chemotherapy treatment regimen. D/w urologist Dr. Maximo England office. Tentatively scheduled for Wednesday am (4/22)   - Anemia: PRBCs 1 unit today  B12, folic acid and EMILIA sent 4/21

## 2020-04-21 NOTE — PROGRESS NOTE ADULT - SUBJECTIVE AND OBJECTIVE BOX
Diagnosis: r/o acute leukemia vs. aplastic anemia    Protocol/Chemo Regimen: to be determined    Day: n/a     Pt endorsed: blurry vision, gum mucosal bleeding this morning     Review of Systems: Denies nausea, vomiting, diarrhea, chest pain, SOB     Pain scale: 0                                           Diet: regular    Allergies    No Known Allergies    Intolerances        ANTIMICROBIALS  levoFLOXacin  Tablet 500 milliGRAM(s) Oral every 24 hours  posaconazole DR Tablet 300 milliGRAM(s) Oral daily      HEME/ONC MEDICATIONS      STANDING MEDICATIONS  allopurinol 300 milliGRAM(s) Oral daily  Biotene Dry Mouth Oral Rinse 5 milliLiter(s) Swish and Spit five times a day  sodium chloride 0.9%. 1000 milliLiter(s) IV Continuous <Continuous>      PRN MEDICATIONS  acetaminophen   Tablet .. 650 milliGRAM(s) Oral every 6 hours PRN  ondansetron Injectable 4 milliGRAM(s) IV Push every 6 hours PRN        Vital Signs Last 24 Hrs  T(C): 36.5 (21 Apr 2020 06:08), Max: 37.2 (20 Apr 2020 18:57)  T(F): 97.7 (21 Apr 2020 06:08), Max: 99 (20 Apr 2020 18:57)  HR: 69 (21 Apr 2020 06:08) (69 - 99)  BP: 109/67 (21 Apr 2020 06:08) (106/65 - 149/86)  BP(mean): --  RR: 16 (21 Apr 2020 06:08) (16 - 18)  SpO2: 98% (21 Apr 2020 06:08) (97% - 100%)    PHYSICAL EXAM  General: adult in NAD  HEENT: clear oropharynx, no erythema, no ulcers  Neck: supple  CV: normal S1, S2, RRR  Lungs: clear to auscultation, no wheezes, no rales  Abdomen: soft, nontender, nondistended, normal BS  Ext: no edema  Skin: no rash  Neuro: alert and oriented x 3    RECENT CULTURES:        LABS: Diagnosis: r/o acute leukemia vs. aplastic anemia    Protocol/Chemo Regimen: to be determined    Day: n/a     Pt endorsed:    Review of Systems: Denies nausea, vomiting, diarrhea, chest pain, SOB     Pain scale: 0                                           Diet: regular    Allergies    No Known Allergies    Intolerances        ANTIMICROBIALS  levoFLOXacin  Tablet 500 milliGRAM(s) Oral every 24 hours  posaconazole DR Tablet 300 milliGRAM(s) Oral daily      HEME/ONC MEDICATIONS      STANDING MEDICATIONS  allopurinol 300 milliGRAM(s) Oral daily  Biotene Dry Mouth Oral Rinse 5 milliLiter(s) Swish and Spit five times a day  sodium chloride 0.9%. 1000 milliLiter(s) IV Continuous <Continuous>      PRN MEDICATIONS  acetaminophen   Tablet .. 650 milliGRAM(s) Oral every 6 hours PRN  ondansetron Injectable 4 milliGRAM(s) IV Push every 6 hours PRN        Vital Signs Last 24 Hrs  T(C): 36.5 (21 Apr 2020 06:08), Max: 37.2 (20 Apr 2020 18:57)  T(F): 97.7 (21 Apr 2020 06:08), Max: 99 (20 Apr 2020 18:57)  HR: 69 (21 Apr 2020 06:08) (69 - 99)  BP: 109/67 (21 Apr 2020 06:08) (106/65 - 149/86)  BP(mean): --  RR: 16 (21 Apr 2020 06:08) (16 - 18)  SpO2: 98% (21 Apr 2020 06:08) (97% - 100%)    PHYSICAL EXAM  General: adult in NAD  HEENT: clear oropharynx, no erythema, no ulcers  Neck: supple  CV: normal S1, S2, RRR  Lungs: clear to auscultation, no wheezes, no rales  Abdomen: soft, nontender, nondistended, normal BS  Ext: no edema  Skin: no rash  Neuro: alert and oriented x 3    RECENT CULTURES:        LABS: Diagnosis: r/o acute leukemia vs. aplastic anemia    Protocol/Chemo Regimen: to be determined    Day: n/a     Pt endorsed:    Review of Systems: Denies nausea, vomiting, diarrhea, chest pain, SOB     Pain scale: 0                                           Diet: regular    Allergies    No Known Allergies    Intolerances        ANTIMICROBIALS  levoFLOXacin  Tablet 500 milliGRAM(s) Oral every 24 hours  posaconazole DR Tablet 300 milliGRAM(s) Oral daily      HEME/ONC MEDICATIONS      STANDING MEDICATIONS  allopurinol 300 milliGRAM(s) Oral daily  Biotene Dry Mouth Oral Rinse 5 milliLiter(s) Swish and Spit five times a day  sodium chloride 0.9%. 1000 milliLiter(s) IV Continuous <Continuous>      PRN MEDICATIONS  acetaminophen   Tablet .. 650 milliGRAM(s) Oral every 6 hours PRN  ondansetron Injectable 4 milliGRAM(s) IV Push every 6 hours PRN        Vital Signs Last 24 Hrs  T(C): 36.5 (21 Apr 2020 06:08), Max: 37.2 (20 Apr 2020 18:57)  T(F): 97.7 (21 Apr 2020 06:08), Max: 99 (20 Apr 2020 18:57)  HR: 69 (21 Apr 2020 06:08) (69 - 99)  BP: 109/67 (21 Apr 2020 06:08) (106/65 - 149/86)  BP(mean): --  RR: 16 (21 Apr 2020 06:08) (16 - 18)  SpO2: 98% (21 Apr 2020 06:08) (97% - 100%)    PHYSICAL EXAM  General: adult in NAD  CV: normal S1, S2, RRR  Lungs: clear to auscultation, no wheezes, no rales  Abdomen: soft, nontender, nondistended, normal BS  Ext: no edema  Skin: no rash  Neuro: alert and oriented x 3    LABS:    Blood Cultures:                           6.5    1.74  )-----------( 53       ( 21 Apr 2020 07:07 )             17.6         Mean Cell Volume : 86.7 fl  Mean Cell Hemoglobin : 32.0 pg  Mean Cell Hemoglobin Concentration : 36.9 gm/dL  Auto Neutrophil # : 0.00 K/uL  Auto Lymphocyte # : 1.67 K/uL  Auto Monocyte # : 0.07 K/uL  Auto Eosinophil # : 0.00 K/uL  Auto Basophil # : 0.00 K/uL  Auto Neutrophil % : 0.0 %  Auto Lymphocyte % : 96.0 %  Auto Monocyte % : 4.0 %  Auto Eosinophil % : 0.0 %  Auto Basophil % : 0.0 %      04-21    139  |  106  |  14  ----------------------------<  93  4.2   |  21<L>  |  0.84    Ca    9.0      21 Apr 2020 07:07  Phos  4.4     04-21  Mg     2.3     04-21    TPro  6.5  /  Alb  3.7  /  TBili  1.1  /  DBili  x   /  AST  9<L>  /  ALT  17  /  AlkPhos  72  04-21      Mg 2.3  Phos 4.4  Mg 2.2  Phos 3.9      PT/INR - ( 21 Apr 2020 07:07 )   PT: 13.5 sec;   INR: 1.17 ratio         PTT - ( 21 Apr 2020 07:07 )  PTT:28.0 sec      Uric Acid 2.8      Uric Acid 2.3 Diagnosis: r/o acute leukemia vs. aplastic anemia    Protocol/Chemo Regimen: to be determined    Day: n/a     Pt endorsed: no complaints    Review of Systems: Denies nausea, vomiting, diarrhea, chest pain, SOB     Pain scale: 0                                           Diet: regular    Allergies    No Known Allergies    Intolerances        ANTIMICROBIALS  levoFLOXacin  Tablet 500 milliGRAM(s) Oral every 24 hours  posaconazole DR Tablet 300 milliGRAM(s) Oral daily      HEME/ONC MEDICATIONS      STANDING MEDICATIONS  allopurinol 300 milliGRAM(s) Oral daily  Biotene Dry Mouth Oral Rinse 5 milliLiter(s) Swish and Spit five times a day  sodium chloride 0.9%. 1000 milliLiter(s) IV Continuous <Continuous>      PRN MEDICATIONS  acetaminophen   Tablet .. 650 milliGRAM(s) Oral every 6 hours PRN  ondansetron Injectable 4 milliGRAM(s) IV Push every 6 hours PRN        Vital Signs Last 24 Hrs  T(C): 36.5 (21 Apr 2020 06:08), Max: 37.2 (20 Apr 2020 18:57)  T(F): 97.7 (21 Apr 2020 06:08), Max: 99 (20 Apr 2020 18:57)  HR: 69 (21 Apr 2020 06:08) (69 - 99)  BP: 109/67 (21 Apr 2020 06:08) (106/65 - 149/86)  BP(mean): --  RR: 16 (21 Apr 2020 06:08) (16 - 18)  SpO2: 98% (21 Apr 2020 06:08) (97% - 100%)    PHYSICAL EXAM  General: adult in NAD  CV: normal S1, S2, RRR  Lungs: clear to auscultation, no wheezes, no rales  Abdomen: soft, nontender, nondistended, normal BS  Ext: no edema  Skin: no rash  Neuro: alert and oriented x 3    LABS:    Blood Cultures:                           6.5    1.74  )-----------( 53       ( 21 Apr 2020 07:07 )             17.6         Mean Cell Volume : 86.7 fl  Mean Cell Hemoglobin : 32.0 pg  Mean Cell Hemoglobin Concentration : 36.9 gm/dL  Auto Neutrophil # : 0.00 K/uL  Auto Lymphocyte # : 1.67 K/uL  Auto Monocyte # : 0.07 K/uL  Auto Eosinophil # : 0.00 K/uL  Auto Basophil # : 0.00 K/uL  Auto Neutrophil % : 0.0 %  Auto Lymphocyte % : 96.0 %  Auto Monocyte % : 4.0 %  Auto Eosinophil % : 0.0 %  Auto Basophil % : 0.0 %      04-21    139  |  106  |  14  ----------------------------<  93  4.2   |  21<L>  |  0.84    Ca    9.0      21 Apr 2020 07:07  Phos  4.4     04-21  Mg     2.3     04-21    TPro  6.5  /  Alb  3.7  /  TBili  1.1  /  DBili  x   /  AST  9<L>  /  ALT  17  /  AlkPhos  72  04-21      Mg 2.3  Phos 4.4  Mg 2.2  Phos 3.9      PT/INR - ( 21 Apr 2020 07:07 )   PT: 13.5 sec;   INR: 1.17 ratio         PTT - ( 21 Apr 2020 07:07 )  PTT:28.0 sec      Uric Acid 2.8      Uric Acid 2.3

## 2020-04-21 NOTE — PROGRESS NOTE ADULT - PROBLEM SELECTOR PLAN 2
Patient is neutropenic, afebrile  Continue Levaquin and posaconazole for prophylaxis  If febrile, panculture and change Levaquin to Cefepime

## 2020-04-21 NOTE — PROGRESS NOTE ADULT - ASSESSMENT
35 y/o Male with no PMH p/w dizziness in setting of 2 weeks of fevers/chills, melena and easy bruising, found to be pancytopenic with Hb 3.6 and platelets of 2,000, rule out acute leukemic process vs aplastic anemia. Patient has been transferred to [facility] from [facility] for further hematologic work-up. Patient has pancytopenia secondary to disease process.

## 2020-04-21 NOTE — PROGRESS NOTE ADULT - PROBLEM SELECTOR PLAN 3
Suspect retinal hemorrhage from thrombocytopenia  Transfuse to keep PLTs above 50k for now  Appreciate Opthalmology consultation Suspect retinal hemorrhage from thrombocytopenia  Transfuse to keep PLTs above 50k for now  Opthalmology [id number]:  Patient should follow up his/her ophthalmologist or in the [facility] Ophthalmology Practice within one week  [address]  [place] [address] [phone]

## 2020-04-21 NOTE — PROGRESS NOTE ADULT - ATTENDING COMMENTS
34M with no PMH presented to OSH with dizziness x 2 weeks, easy bruising x 2-4 weeks. At home had a syncopal event and called 911, found to have hemoglobin 3s, Platelets 2K, no evidence of DIC. Noted mild petechiae and has had gum bleeding. These have improved. Noted dark stools for 2 weeks. Peripheral smear with immature forms concerning for acute leukemia vs. aplastic anemia   -peripheral flow without blast population, FISH pending   -receiving ATRA 50 mg BID until PML-CHANTELLE negative   -CBC, TLS, DIC labs q 12h   -cont allopurinol, IVF, mouth care  -receiving transfusions if Hgb< 7, Plt <50k   -optho consult given hazy vision, suspect retinal bleed given low platelets   -monitor for s/s of bleeding, gum bleeding intermittent   -bone marrow biopsy today  -Paranasal sinusitis on CT head(4/17)- cont Levaquin, begin Posaconazole 34M with no PMH presented to OSH with dizziness x 2 weeks, easy bruising x 2-4 weeks. At home had a syncopal event and called 911, found to have hemoglobin 3s, Platelets 2K, no evidence of DIC. Noted mild petechiae and has had gum bleeding. These have improved. Noted dark stools for 2 weeks. Peripheral smear with immature forms concerning for acute leukemia vs. aplastic anemia   -peripheral flow without blast population, FISH pending   -receiving ATRA 50 mg BID until PML-CHANTELLE negative   -CBC, TLS, DIC labs q 12h   -cont allopurinol, IVF, mouth care  -receiving transfusions if Hgb< 7, Plt <50k   -optho consult given hazy vision, suspect retinal bleed given low platelets   -monitor for s/s of bleeding, gum bleeding intermittent   -await bone marrow biopsy results done on 4/20  -Paranasal sinusitis on CT head(4/17)- cont Levaquin, begin Posaconazole

## 2020-04-22 LAB
ALBUMIN SERPL ELPH-MCNC: 3.8 G/DL — SIGNIFICANT CHANGE UP (ref 3.3–5)
ALBUMIN SERPL ELPH-MCNC: 4.2 G/DL — SIGNIFICANT CHANGE UP (ref 3.3–5)
ALP SERPL-CCNC: 81 U/L — SIGNIFICANT CHANGE UP (ref 40–120)
ALP SERPL-CCNC: 94 U/L — SIGNIFICANT CHANGE UP (ref 40–120)
ALT FLD-CCNC: 21 U/L — SIGNIFICANT CHANGE UP (ref 10–45)
ALT FLD-CCNC: 23 U/L — SIGNIFICANT CHANGE UP (ref 10–45)
ANA TITR SER: NEGATIVE — SIGNIFICANT CHANGE UP
ANION GAP SERPL CALC-SCNC: 12 MMOL/L — SIGNIFICANT CHANGE UP (ref 5–17)
ANION GAP SERPL CALC-SCNC: 17 MMOL/L — SIGNIFICANT CHANGE UP (ref 5–17)
APTT BLD: 19.9 SEC — LOW (ref 27.5–36.3)
APTT BLD: 30.2 SEC — SIGNIFICANT CHANGE UP (ref 27.5–36.3)
AST SERPL-CCNC: 12 U/L — SIGNIFICANT CHANGE UP (ref 10–40)
AST SERPL-CCNC: 12 U/L — SIGNIFICANT CHANGE UP (ref 10–40)
BASOPHILS # BLD AUTO: 0 K/UL — SIGNIFICANT CHANGE UP (ref 0–0.2)
BASOPHILS NFR BLD AUTO: 0 % — SIGNIFICANT CHANGE UP (ref 0–2)
BILIRUB SERPL-MCNC: 0.7 MG/DL — SIGNIFICANT CHANGE UP (ref 0.2–1.2)
BILIRUB SERPL-MCNC: 0.8 MG/DL — SIGNIFICANT CHANGE UP (ref 0.2–1.2)
BUN SERPL-MCNC: 16 MG/DL — SIGNIFICANT CHANGE UP (ref 7–23)
BUN SERPL-MCNC: 16 MG/DL — SIGNIFICANT CHANGE UP (ref 7–23)
CALCIUM SERPL-MCNC: 9.1 MG/DL — SIGNIFICANT CHANGE UP (ref 8.4–10.5)
CALCIUM SERPL-MCNC: 9.5 MG/DL — SIGNIFICANT CHANGE UP (ref 8.4–10.5)
CHLORIDE SERPL-SCNC: 102 MMOL/L — SIGNIFICANT CHANGE UP (ref 96–108)
CHLORIDE SERPL-SCNC: 106 MMOL/L — SIGNIFICANT CHANGE UP (ref 96–108)
CO2 SERPL-SCNC: 20 MMOL/L — LOW (ref 22–31)
CO2 SERPL-SCNC: 21 MMOL/L — LOW (ref 22–31)
CREAT SERPL-MCNC: 0.83 MG/DL — SIGNIFICANT CHANGE UP (ref 0.5–1.3)
CREAT SERPL-MCNC: 0.85 MG/DL — SIGNIFICANT CHANGE UP (ref 0.5–1.3)
D DIMER BLD IA.RAPID-MCNC: 272 NG/ML DDU — HIGH
D DIMER BLD IA.RAPID-MCNC: 279 NG/ML DDU — HIGH
EOSINOPHIL # BLD AUTO: 0 K/UL — SIGNIFICANT CHANGE UP (ref 0–0.5)
EOSINOPHIL NFR BLD AUTO: 0 % — SIGNIFICANT CHANGE UP (ref 0–6)
FIBRINOGEN PPP-MCNC: 580 MG/DL — HIGH (ref 320–500)
FIBRINOGEN PPP-MCNC: 623 MG/DL — HIGH (ref 320–500)
FOLATE RBC-MCNC: 1375 NG/ML — SIGNIFICANT CHANGE UP (ref 499–1504)
GLUCOSE SERPL-MCNC: 116 MG/DL — HIGH (ref 70–99)
GLUCOSE SERPL-MCNC: 140 MG/DL — HIGH (ref 70–99)
HCT VFR BLD CALC: 21.6 % — LOW (ref 39–50)
HCT VFR BLD CALC: 22.2 % — LOW (ref 39–50)
HEMATOPATHOLOGY REPORT: SIGNIFICANT CHANGE UP
HGB BLD-MCNC: 7.8 G/DL — LOW (ref 13–17)
HGB BLD-MCNC: 7.9 G/DL — LOW (ref 13–17)
INR BLD: 1.12 RATIO — SIGNIFICANT CHANGE UP (ref 0.88–1.16)
INR BLD: 1.15 RATIO — SIGNIFICANT CHANGE UP (ref 0.88–1.16)
LDH SERPL L TO P-CCNC: 125 U/L — SIGNIFICANT CHANGE UP (ref 50–242)
LDH SERPL L TO P-CCNC: 128 U/L — SIGNIFICANT CHANGE UP (ref 50–242)
LYMPHOCYTES # BLD AUTO: 1.64 K/UL — SIGNIFICANT CHANGE UP (ref 1–3.3)
LYMPHOCYTES # BLD AUTO: 94.8 % — HIGH (ref 13–44)
MAGNESIUM SERPL-MCNC: 2.2 MG/DL — SIGNIFICANT CHANGE UP (ref 1.6–2.6)
MAGNESIUM SERPL-MCNC: 2.3 MG/DL — SIGNIFICANT CHANGE UP (ref 1.6–2.6)
MCHC RBC-ENTMCNC: 30.6 PG — SIGNIFICANT CHANGE UP (ref 27–34)
MCHC RBC-ENTMCNC: 31 PG — SIGNIFICANT CHANGE UP (ref 27–34)
MCHC RBC-ENTMCNC: 35.6 GM/DL — SIGNIFICANT CHANGE UP (ref 32–36)
MCHC RBC-ENTMCNC: 36.1 GM/DL — HIGH (ref 32–36)
MCV RBC AUTO: 85.7 FL — SIGNIFICANT CHANGE UP (ref 80–100)
MCV RBC AUTO: 86 FL — SIGNIFICANT CHANGE UP (ref 80–100)
MONOCYTES # BLD AUTO: 0.02 K/UL — SIGNIFICANT CHANGE UP (ref 0–0.9)
MONOCYTES NFR BLD AUTO: 0.9 % — LOW (ref 2–14)
NEUTROPHILS # BLD AUTO: 0.07 K/UL — LOW (ref 1.8–7.4)
NEUTROPHILS NFR BLD AUTO: 4.3 % — LOW (ref 43–77)
NRBC # BLD: 0 /100 WBCS — SIGNIFICANT CHANGE UP (ref 0–0)
PHOSPHATE SERPL-MCNC: 3.8 MG/DL — SIGNIFICANT CHANGE UP (ref 2.5–4.5)
PHOSPHATE SERPL-MCNC: 4 MG/DL — SIGNIFICANT CHANGE UP (ref 2.5–4.5)
PLATELET # BLD AUTO: 38 K/UL — LOW (ref 150–400)
PLATELET # BLD AUTO: 74 K/UL — LOW (ref 150–400)
POTASSIUM SERPL-MCNC: 4 MMOL/L — SIGNIFICANT CHANGE UP (ref 3.5–5.3)
POTASSIUM SERPL-MCNC: 4 MMOL/L — SIGNIFICANT CHANGE UP (ref 3.5–5.3)
POTASSIUM SERPL-SCNC: 4 MMOL/L — SIGNIFICANT CHANGE UP (ref 3.5–5.3)
POTASSIUM SERPL-SCNC: 4 MMOL/L — SIGNIFICANT CHANGE UP (ref 3.5–5.3)
PROT SERPL-MCNC: 7 G/DL — SIGNIFICANT CHANGE UP (ref 6–8.3)
PROT SERPL-MCNC: 7.4 G/DL — SIGNIFICANT CHANGE UP (ref 6–8.3)
PROTHROM AB SERPL-ACNC: 12.9 SEC — SIGNIFICANT CHANGE UP (ref 10–12.9)
PROTHROM AB SERPL-ACNC: 13.3 SEC — HIGH (ref 10–12.9)
RBC # BLD: 2.52 M/UL — LOW (ref 4.2–5.8)
RBC # BLD: 2.58 M/UL — LOW (ref 4.2–5.8)
RBC # FLD: 13.1 % — SIGNIFICANT CHANGE UP (ref 10.3–14.5)
RBC # FLD: 13.3 % — SIGNIFICANT CHANGE UP (ref 10.3–14.5)
SARS-COV-2 RNA SPEC QL NAA+PROBE: SIGNIFICANT CHANGE UP
SODIUM SERPL-SCNC: 139 MMOL/L — SIGNIFICANT CHANGE UP (ref 135–145)
SODIUM SERPL-SCNC: 139 MMOL/L — SIGNIFICANT CHANGE UP (ref 135–145)
URATE SERPL-MCNC: 2.5 MG/DL — LOW (ref 3.4–8.8)
URATE SERPL-MCNC: 3 MG/DL — LOW (ref 3.4–8.8)
WBC # BLD: 1.62 K/UL — LOW (ref 3.8–10.5)
WBC # BLD: 1.73 K/UL — LOW (ref 3.8–10.5)
WBC # FLD AUTO: 1.62 K/UL — LOW (ref 3.8–10.5)
WBC # FLD AUTO: 1.73 K/UL — LOW (ref 3.8–10.5)

## 2020-04-22 PROCEDURE — 99233 SBSQ HOSP IP/OBS HIGH 50: CPT

## 2020-04-22 RX ORDER — EPINEPHRINE 0.3 MG/.3ML
0.3 INJECTION INTRAMUSCULAR; SUBCUTANEOUS ONCE
Refills: 0 | Status: DISCONTINUED | OUTPATIENT
Start: 2020-04-22 | End: 2020-05-20

## 2020-04-22 RX ADMIN — Medication 5 MILLILITER(S): at 00:07

## 2020-04-22 RX ADMIN — POSACONAZOLE 300 MILLIGRAM(S): 100 TABLET, DELAYED RELEASE ORAL at 10:57

## 2020-04-22 RX ADMIN — Medication 5 MILLILITER(S): at 23:03

## 2020-04-22 RX ADMIN — Medication 300 MILLIGRAM(S): at 10:56

## 2020-04-22 RX ADMIN — Medication 5 MILLILITER(S): at 07:41

## 2020-04-22 RX ADMIN — Medication 5 MILLILITER(S): at 10:57

## 2020-04-22 RX ADMIN — Medication 5 MILLILITER(S): at 15:38

## 2020-04-22 RX ADMIN — Medication 5 MILLILITER(S): at 21:16

## 2020-04-22 RX ADMIN — SODIUM CHLORIDE 75 MILLILITER(S): 9 INJECTION INTRAMUSCULAR; INTRAVENOUS; SUBCUTANEOUS at 17:21

## 2020-04-22 NOTE — PROGRESS NOTE ADULT - PROBLEM SELECTOR PLAN 2
Patient is neutropenic, afebrile  Continue Levaquin and posaconazole for prophylaxis  If febrile, panculture and change Levaquin to Cefepime Patient is neutropenic, afebrile  Continue Levaquin and posaconazole for prophylaxis  Will need to add Mepron, Acyclovir tomorrow  If febrile, panculture and change Levaquin to Cefepime Patient is neutropenic, afebrile  Continue Levaquin and posaconazole for prophylaxis  Will need to add Mepron, Acyclovir tomorrow  If febrile, panculture and change Levaquin to Cefepime  COVID-19 surveillance 4/22 (-) negative

## 2020-04-22 NOTE — PROGRESS NOTE ADULT - PROBLEM SELECTOR PLAN 1
Rule acute leukemia vs aplastic anemia  Peripheral flow cytometry/FISH sent 4/18 - follow up results  Follow up Bone Marrow biopsy results - performed 4/20   Follow up CBC/TLS/DIC labs panel Q12  Strict I&Os, daily weights, mouth care  Continue Allopurinol 300mg PO daily  HLA typing to send out 4/20  Follow up Willy test,   CMV (-)  EBV, Parvovirus  HIV/Hepatitis panel negative  Sperm banking to patient prior to possible chemotherapy treatment regimen. D/w urologist Dr. Maximo England office. Tentatively scheduled for Wednesday am (4/22)   - Anemia: PRBCs 1 unit today  B12, folic acid and EMILIA sent 4/21 Rule acute leukemia vs aplastic anemia  Peripheral flow cytometry/FISH sent 4/18 - shows increased lymphocytes  Follow up Bone Marrow biopsy results - performed 4/20   FLT3 negative  Follow up CBC/TLS/DIC labs panel Q12  Strict I&Os, daily weights, mouth care  Continue Allopurinol 300mg PO daily  HLA typing sent out 4/20  CMV (-)  EBV, Parvovirus  HIV/Hepatitis panel negative  Sperm banking to patient prior to possible chemotherapy treatment regimen. D/w urologist Dr. Maximo England office. Tentatively scheduled for Wednesday am (4/22)   B12, folic acid and EMILIA sent 4/21 4/20 BM Bx consistent with aplastic anemia  ATG Intradermal test dose today  Plan to start h-ATG, Cyclosporine, Prednisone, Eltrombopag tomorrow:  Horse ATG 40mg/kg/day = 3880 mg day x 4 days  Cyclosporine 10mg/kg/day divided BID = 485mg BID  Prednisone 1mg/kg day = 100mg daily x 10 days  Eltrombopag 150mg daily  Pre-medication prior to ATG with Tylenol/Benadryl/Solu-medrol  Follow up CBC/TLS/DIC labs panel Q12  Strict I&Os, daily weights, mouth care  Continue Allopurinol 300mg PO daily  HLA typing sent out 4/20  CMV (-)  EBV, Parvovirus  HIV/Hepatitis panel negative  Sperm banking to patient prior to possible chemotherapy treatment regimen. D/w urologist Dr. Maximo England office. Tentatively scheduled for Wednesday am (4/22)   B12, folic acid and EMILIA sent 4/21 4/20 BM Bx consistent with aplastic anemia  ATG Intradermal test dose today  Plan to start h-ATG, Cyclosporine, Prednisone, Eltrombopag tomorrow:  Horse ATG 40mg/kg/day = 3880 mg daily x 4 days  Cyclosporine 10mg/kg/day divided BID = 485mg BID  Prednisone 1mg/kg/day = 100mg daily x 10 days, then taper, to prevent serum sickness  Eltrombopag 150mg daily  Pre-medication prior to ATG with Tylenol/Benadryl/Solu-medrol  Follow up CBC/TLS/DIC labs panel Q12  Strict I&Os, daily weights, mouth care  Continue Allopurinol 300mg PO daily  HLA typing sent out 4/20  CMV (-)  EBV, Parvovirus  HIV/Hepatitis panel negative  Sperm banking to patient prior to possible chemotherapy treatment regimen. D/w urologist Dr. Maximo England office. Tentatively scheduled for Wednesday am (4/22)   B12, folic acid and EMILIA sent 4/21

## 2020-04-22 NOTE — PROGRESS NOTE ADULT - ASSESSMENT
35 y/o Male with no PMH p/w dizziness in setting of 2 weeks of fevers/chills, melena and easy bruising, found to be pancytopenic with Hb 3.6 and platelets of 2,000, rule out acute leukemic process vs aplastic anemia. Patient has been transferred to [facility] from [facility] for further hematologic work-up. Patient has pancytopenia secondary to disease process. 35 y/o Male with no PMH p/w dizziness in setting of 2 weeks of fevers/chills, melena and easy bruising, found to be pancytopenic with Hb 3.6 and platelets of 2,000.  Patient has been transferred to [facility] from [facility] for further hematologic work-up. Bone Marrow biopsy performed 4/20 revealed Aplastic Anemia. Patient has pancytopenia secondary to disease process.

## 2020-04-22 NOTE — PROGRESS NOTE ADULT - ATTENDING COMMENTS
34M with no PMH presented to OSH with dizziness x 2 weeks, easy bruising x 2-4 weeks. At home had a syncopal event and called 911, found to have hemoglobin 3s, Platelets 2K, no evidence of DIC. Noted mild petechiae and has had gum bleeding. These have improved. Noted dark stools for 2 weeks. Peripheral smear with immature forms concerning for acute leukemia vs. aplastic anemia   -peripheral flow without blast population, FISH pending   -receiving ATRA 50 mg BID until PML-CHANTELLE negative   -CBC, TLS, DIC labs q 12h   -cont allopurinol, IVF, mouth care  -receiving transfusions if Hgb< 7, Plt <50k   -optho consult given hazy vision, suspect retinal bleed given low platelets   -monitor for s/s of bleeding, gum bleeding intermittent   -await bone marrow biopsy results done on 4/20  -Paranasal sinusitis on CT head(4/17)- cont Levaquin, begin Posaconazole 34M with no PMH presented to OSH with dizziness x 2 weeks, easy bruising x 2-4 weeks. At home had a syncopal event and called 911, found to have hemoglobin 3s, Platelets 2K, no evidence of DIC. Noted mild petechiae and has had gum bleeding. These have improved. Noted dark stools for 2 weeks. Peripheral smear with immature forms concerning for acute leukemia vs. aplastic anemia   -peripheral flow without blast population, FISH pending   -received ATRA 50 mg BID until PML-CHANTELLE negative   -Bone marrow evaluation(4/20) in conjunction with lab studies document severe aplastic anemia(no inciting factors identified).  I had lengthy discussion with patient regarding the diagnosis, prognosis and treatment recommendations at present time: hATG, Cyclosporine, Eltrombopag. Potential benefits as well as side effects of this combination regimen include(but not limited to) fever, chills, headache, dizziness, nausea, vomiting, diarrhea, musculoskeletal pains, liver dysfunction, and rare risk of serum sickness(ATG); kidney dysfunction, electrolyte abnormalities, hypertension, tremors, and with Eltrombopag- increased risk of blood clots if platelet count rapidly rises, liver dysfunction, new or worsening cataracts. Patient asked all of his questions and after all questions addressed, he made the decision to begin recommended treatment regimen. I explained that Prednisone use started with ATG is to prevent serum sickness.   - IVF, mouth care; discontinue Allopurinol  -receiving transfusions if Hgb< 7, Plt <50k   -optho consult given hazy vision, suspect retinal bleed given low platelets   -monitor for s/s of bleeding, gum bleeding intermittent   -Paranasal sinusitis on CT head(4/17)- cont Levaquin, begin Posaconazole; initiate Mepron and Acyclovir with immunosuppressive therapy.

## 2020-04-22 NOTE — PROGRESS NOTE ADULT - PROBLEM SELECTOR PLAN 3
Suspect retinal hemorrhage from thrombocytopenia  Transfuse to keep PLTs above 50k for now  Opthalmology [id number]-Aa:  Patient should follow up his/her ophthalmologist or in the [facility] Ophthalmology Practice within one week  [address]  [place] [address] [phone] Intraretinal hemorrhage from thrombocytopenia + anemia  Transfuse to keep PLTs above 50k for now  Opthalmology [id number]:  Patient should follow up his/her ophthalmologist or in the [facility] Ophthalmology Practice within one week  [address]  [place] [address] [phone]

## 2020-04-22 NOTE — PROGRESS NOTE ADULT - SUBJECTIVE AND OBJECTIVE BOX
Diagnosis:    Protocol/Chemo Regimen:    Day:     Pt endorsed:    Review of Systems:     Pain scale:     Diet:     Allergies    No Known Allergies    Intolerances        ANTIMICROBIALS  levoFLOXacin  Tablet 500 milliGRAM(s) Oral every 24 hours  posaconazole DR Tablet 300 milliGRAM(s) Oral daily      HEME/ONC MEDICATIONS      STANDING MEDICATIONS  allopurinol 300 milliGRAM(s) Oral daily  Biotene Dry Mouth Oral Rinse 5 milliLiter(s) Swish and Spit five times a day  sodium chloride 0.9%. 1000 milliLiter(s) IV Continuous <Continuous>      PRN MEDICATIONS  acetaminophen   Tablet .. 650 milliGRAM(s) Oral every 6 hours PRN  ondansetron Injectable 4 milliGRAM(s) IV Push every 6 hours PRN        Vital Signs Last 24 Hrs  T(C): 36.4 (22 Apr 2020 05:43), Max: 36.8 (21 Apr 2020 09:09)  T(F): 97.5 (22 Apr 2020 05:43), Max: 98.3 (21 Apr 2020 21:08)  HR: 84 (22 Apr 2020 05:43) (72 - 84)  BP: 105/62 (22 Apr 2020 05:43) (105/62 - 125/68)  BP(mean): --  RR: 18 (22 Apr 2020 05:43) (18 - 18)  SpO2: 99% (22 Apr 2020 05:43) (98% - 99%)    PHYSICAL EXAM  General: NAD  HEENT: PERRLA, EOMI, clear oropharynx  Neck: supple  CV: (+) S1/S2 RRR  Lungs: clear to auscultation, no wheezes or rales  Abdomen: soft, non-tender, non-distended (+) BS  Ext: no edema  Skin: no rashes   Neuro: alert and oriented X 3, no focal deficits  Central Line:     RECENT CULTURES:        LABS:                        x      x     )-----------( x        ( 21 Apr 2020 19:25 )             22.4         Mean Cell Volume : 85.2 fl  Mean Cell Hemoglobin : 30.7 pg  Mean Cell Hemoglobin Concentration : 36.0 gm/dL  Auto Neutrophil # : x  Auto Lymphocyte # : x  Auto Monocyte # : x  Auto Eosinophil # : x  Auto Basophil # : x  Auto Neutrophil % : x  Auto Lymphocyte % : x  Auto Monocyte % : x  Auto Eosinophil % : x  Auto Basophil % : x      04-21    138  |  104  |  16  ----------------------------<  100<H>  4.2   |  20<L>  |  0.90    Ca    9.1      21 Apr 2020 17:29  Phos  3.9     04-21  Mg     2.3     04-21    TPro  7.2  /  Alb  4.1  /  TBili  1.5<H>  /  DBili  x   /  AST  9<L>  /  ALT  19  /  AlkPhos  92  04-21      Mg 2.3  Phos 3.9      PT/INR - ( 21 Apr 2020 17:29 )   PT: 13.6 sec;   INR: 1.19 ratio         PTT - ( 21 Apr 2020 17:29 )  PTT:26.6 sec      Uric Acid 2.5        RADIOLOGY & ADDITIONAL STUDIES: Diagnosis: r/o leukemia vs. aplastic anemia    Protocol/Chemo Regimen: to be determined    Day: n/a    Pt endorsed:    Review of Systems:     Pain scale:     Diet: regular    Allergies: No Known Allergies      ANTIMICROBIALS  levoFLOXacin  Tablet 500 milliGRAM(s) Oral every 24 hours  posaconazole DR Tablet 300 milliGRAM(s) Oral daily      STANDING MEDICATIONS  allopurinol 300 milliGRAM(s) Oral daily  Biotene Dry Mouth Oral Rinse 5 milliLiter(s) Swish and Spit five times a day  sodium chloride 0.9%. 1000 milliLiter(s) IV Continuous <Continuous>      PRN MEDICATIONS  acetaminophen   Tablet .. 650 milliGRAM(s) Oral every 6 hours PRN  ondansetron Injectable 4 milliGRAM(s) IV Push every 6 hours PRN      Vital Signs Last 24 Hrs  T(C): 36.4 (22 Apr 2020 05:43), Max: 36.8 (21 Apr 2020 09:09)  T(F): 97.5 (22 Apr 2020 05:43), Max: 98.3 (21 Apr 2020 21:08)  HR: 84 (22 Apr 2020 05:43) (72 - 84)  BP: 105/62 (22 Apr 2020 05:43) (105/62 - 125/68)  RR: 18 (22 Apr 2020 05:43) (18 - 18)  SpO2: 99% (22 Apr 2020 05:43) (98% - 99%)      PHYSICAL EXAM  General: adult in NAD  CV: normal S1, S2, RRR  Lungs: clear to auscultation, no wheezes, no rales  Abdomen: soft, nontender, nondistended, normal BS  Ext: no edema  Skin: no rash  Neuro: alert and oriented x 3    CULTURES: no recent      LABS:                        x      x     )-----------( x        ( 21 Apr 2020 19:25 )             22.4         Mean Cell Volume : 85.2 fl  Mean Cell Hemoglobin : 30.7 pg  Mean Cell Hemoglobin Concentration : 36.0 gm/dL  Auto Neutrophil # : x  Auto Lymphocyte # : x  Auto Monocyte # : x  Auto Eosinophil # : x  Auto Basophil # : x  Auto Neutrophil % : x  Auto Lymphocyte % : x  Auto Monocyte % : x  Auto Eosinophil % : x  Auto Basophil % : x      04-21    138  |  104  |  16  ----------------------------<  100<H>  4.2   |  20<L>  |  0.90    Ca    9.1      21 Apr 2020 17:29  Phos  3.9     04-21  Mg     2.3     04-21    TPro  7.2  /  Alb  4.1  /  TBili  1.5<H>  /  DBili  x   /  AST  9<L>  /  ALT  19  /  AlkPhos  92  04-21      Mg 2.3  Phos 3.9      PT/INR - ( 21 Apr 2020 17:29 )   PT: 13.6 sec;   INR: 1.19 ratio         PTT - ( 21 Apr 2020 17:29 )  PTT:26.6 sec      Uric Acid 2.5        RADIOLOGY & ADDITIONAL STUDIES: Diagnosis: r/o leukemia vs. aplastic anemia    Protocol/Chemo Regimen: to be determined    Day: n/a    Pt endorsed: no complaints    Review of Systems: denies nausea, vomiting, diarrhea, chest pain, SOB    Pain scale: 0    Diet: regular    Allergies: No Known Allergies      ANTIMICROBIALS  levoFLOXacin  Tablet 500 milliGRAM(s) Oral every 24 hours  posaconazole DR Tablet 300 milliGRAM(s) Oral daily      STANDING MEDICATIONS  allopurinol 300 milliGRAM(s) Oral daily  Biotene Dry Mouth Oral Rinse 5 milliLiter(s) Swish and Spit five times a day  sodium chloride 0.9%. 1000 milliLiter(s) IV Continuous <Continuous>      PRN MEDICATIONS  acetaminophen   Tablet .. 650 milliGRAM(s) Oral every 6 hours PRN  ondansetron Injectable 4 milliGRAM(s) IV Push every 6 hours PRN      Vital Signs Last 24 Hrs  T(C): 36.4 (22 Apr 2020 05:43), Max: 36.8 (21 Apr 2020 09:09)  T(F): 97.5 (22 Apr 2020 05:43), Max: 98.3 (21 Apr 2020 21:08)  HR: 84 (22 Apr 2020 05:43) (72 - 84)  BP: 105/62 (22 Apr 2020 05:43) (105/62 - 125/68)  RR: 18 (22 Apr 2020 05:43) (18 - 18)  SpO2: 99% (22 Apr 2020 05:43) (98% - 99%)      PHYSICAL EXAM  General: adult in NAD  CV: normal S1, S2, RRR  Lungs: clear to auscultation, no wheezes, no rales  Abdomen: soft, nontender, nondistended, normal BS  Ext: no edema  Skin: no rash  Neuro: alert and oriented x 3    CULTURES: no recent      LABS:                                  7.8    1.73  )-----------( 38       ( 22 Apr 2020 09:56 )             21.6     22 Apr 2020 09:56    139    |  106    |  16     ----------------------------<  116    4.0     |  21     |  0.85     Ca    9.1        22 Apr 2020 09:56  Phos  3.8       22 Apr 2020 09:56  Mg     2.3       22 Apr 2020 09:56    TPro  7.0    /  Alb  3.8    /  TBili  0.8    /  DBili  x      /  AST  12     /  ALT  21     /  AlkPhos  81     22 Apr 2020 09:56    PT/INR - ( 22 Apr 2020 09:56 )   PT: 12.9 sec;   INR: 1.12 ratio    PTT - ( 22 Apr 2020 09:56 )  PTT:19.9 sec      LIVER FUNCTIONS - ( 22 Apr 2020 09:56 )  Alb: 3.8 g/dL / Pro: 7.0 g/dL / ALK PHOS: 81 U/L / ALT: 21 U/L / AST: 12 U/L / GGT: x             RADIOLOGY & ADDITIONAL STUDIES:    from: CT Head No Cont (04.17.20 @ 16:11)   IMPRESSION:   1.  No acute intracranial hemorrhage or mass effect.   2.  Paranasal sinusitis. Diagnosis: Aplastic anemia    Protocol/Chemo Regimen: ATG/Cyclosporine/Prednisone/Eltrombopag    Day: 0    Pt endorsed: no complaints    Review of Systems: denies nausea, vomiting, diarrhea, chest pain, SOB    Pain scale: 0    Diet: regular    Allergies: No Known Allergies      ANTIMICROBIALS  levoFLOXacin  Tablet 500 milliGRAM(s) Oral every 24 hours  posaconazole DR Tablet 300 milliGRAM(s) Oral daily      STANDING MEDICATIONS  allopurinol 300 milliGRAM(s) Oral daily  Biotene Dry Mouth Oral Rinse 5 milliLiter(s) Swish and Spit five times a day  sodium chloride 0.9%. 1000 milliLiter(s) IV Continuous <Continuous>      PRN MEDICATIONS  acetaminophen   Tablet .. 650 milliGRAM(s) Oral every 6 hours PRN  ondansetron Injectable 4 milliGRAM(s) IV Push every 6 hours PRN      Vital Signs Last 24 Hrs  T(C): 36.4 (22 Apr 2020 05:43), Max: 36.8 (21 Apr 2020 09:09)  T(F): 97.5 (22 Apr 2020 05:43), Max: 98.3 (21 Apr 2020 21:08)  HR: 84 (22 Apr 2020 05:43) (72 - 84)  BP: 105/62 (22 Apr 2020 05:43) (105/62 - 125/68)  RR: 18 (22 Apr 2020 05:43) (18 - 18)  SpO2: 99% (22 Apr 2020 05:43) (98% - 99%)      PHYSICAL EXAM  General: adult in NAD  CV: normal S1, S2, RRR  Lungs: clear to auscultation, no wheezes, no rales  Abdomen: soft, nontender, nondistended, normal BS  Ext: no edema  Skin: no rash  Neuro: alert and oriented x 3    CULTURES: no recent      LABS:                                  7.8    1.73  )-----------( 38       ( 22 Apr 2020 09:56 )             21.6     22 Apr 2020 09:56    139    |  106    |  16     ----------------------------<  116    4.0     |  21     |  0.85     Ca    9.1        22 Apr 2020 09:56  Phos  3.8       22 Apr 2020 09:56  Mg     2.3       22 Apr 2020 09:56    TPro  7.0    /  Alb  3.8    /  TBili  0.8    /  DBili  x      /  AST  12     /  ALT  21     /  AlkPhos  81     22 Apr 2020 09:56    PT/INR - ( 22 Apr 2020 09:56 )   PT: 12.9 sec;   INR: 1.12 ratio    PTT - ( 22 Apr 2020 09:56 )  PTT:19.9 sec      LIVER FUNCTIONS - ( 22 Apr 2020 09:56 )  Alb: 3.8 g/dL / Pro: 7.0 g/dL / ALK PHOS: 81 U/L / ALT: 21 U/L / AST: 12 U/L / GGT: x             RADIOLOGY & ADDITIONAL STUDIES:    from: CT Head No Cont (04.17.20 @ 16:11)   IMPRESSION:   1.  No acute intracranial hemorrhage or mass effect.   2.  Paranasal sinusitis. Diagnosis: Aplastic anemia    Protocol/Chemo Regimen: ATG/Cyclosporine/Prednisone/Eltrombopag    Day: 0    Pt endorsed: slight blurry vision    Review of Systems: denies nausea, vomiting, diarrhea, chest pain, SOB    Pain scale: 0    Diet: regular    Allergies: No Known Allergies      ANTIMICROBIALS  levoFLOXacin  Tablet 500 milliGRAM(s) Oral every 24 hours  posaconazole DR Tablet 300 milliGRAM(s) Oral daily      STANDING MEDICATIONS  allopurinol 300 milliGRAM(s) Oral daily  Biotene Dry Mouth Oral Rinse 5 milliLiter(s) Swish and Spit five times a day  sodium chloride 0.9%. 1000 milliLiter(s) IV Continuous <Continuous>      PRN MEDICATIONS  acetaminophen   Tablet .. 650 milliGRAM(s) Oral every 6 hours PRN  ondansetron Injectable 4 milliGRAM(s) IV Push every 6 hours PRN      Vital Signs Last 24 Hrs  T(C): 36.4 (22 Apr 2020 05:43), Max: 36.8 (21 Apr 2020 09:09)  T(F): 97.5 (22 Apr 2020 05:43), Max: 98.3 (21 Apr 2020 21:08)  HR: 84 (22 Apr 2020 05:43) (72 - 84)  BP: 105/62 (22 Apr 2020 05:43) (105/62 - 125/68)  RR: 18 (22 Apr 2020 05:43) (18 - 18)  SpO2: 99% (22 Apr 2020 05:43) (98% - 99%)      PHYSICAL EXAM  General: adult in NAD  CV: normal S1, S2, RRR  Lungs: clear to auscultation, no wheezes, no rales  Abdomen: soft, nontender, nondistended, normal BS  Ext: no edema  Skin: no rash  Neuro: alert and oriented x 3    CULTURES: no recent      LABS:                                  7.8    1.73  )-----------( 38       ( 22 Apr 2020 09:56 )             21.6     22 Apr 2020 09:56    139    |  106    |  16     ----------------------------<  116    4.0     |  21     |  0.85     Ca    9.1        22 Apr 2020 09:56  Phos  3.8       22 Apr 2020 09:56  Mg     2.3       22 Apr 2020 09:56    TPro  7.0    /  Alb  3.8    /  TBili  0.8    /  DBili  x      /  AST  12     /  ALT  21     /  AlkPhos  81     22 Apr 2020 09:56    PT/INR - ( 22 Apr 2020 09:56 )   PT: 12.9 sec;   INR: 1.12 ratio    PTT - ( 22 Apr 2020 09:56 )  PTT:19.9 sec      LIVER FUNCTIONS - ( 22 Apr 2020 09:56 )  Alb: 3.8 g/dL / Pro: 7.0 g/dL / ALK PHOS: 81 U/L / ALT: 21 U/L / AST: 12 U/L / GGT: x             RADIOLOGY & ADDITIONAL STUDIES:    from: CT Head No Cont (04.17.20 @ 16:11)   IMPRESSION:   1.  No acute intracranial hemorrhage or mass effect.   2.  Paranasal sinusitis.

## 2020-04-23 LAB
ALBUMIN SERPL ELPH-MCNC: 3.9 G/DL — SIGNIFICANT CHANGE UP (ref 3.3–5)
ALBUMIN SERPL ELPH-MCNC: 4.4 G/DL — SIGNIFICANT CHANGE UP (ref 3.3–5)
ALP SERPL-CCNC: 79 U/L — SIGNIFICANT CHANGE UP (ref 40–120)
ALP SERPL-CCNC: 90 U/L — SIGNIFICANT CHANGE UP (ref 40–120)
ALT FLD-CCNC: 24 U/L — SIGNIFICANT CHANGE UP (ref 10–45)
ALT FLD-CCNC: 30 U/L — SIGNIFICANT CHANGE UP (ref 10–45)
ANION GAP SERPL CALC-SCNC: 13 MMOL/L — SIGNIFICANT CHANGE UP (ref 5–17)
ANION GAP SERPL CALC-SCNC: 15 MMOL/L — SIGNIFICANT CHANGE UP (ref 5–17)
APTT BLD: 26.2 SEC — LOW (ref 27.5–36.3)
APTT BLD: 28.5 SEC — SIGNIFICANT CHANGE UP (ref 27.5–36.3)
AST SERPL-CCNC: 13 U/L — SIGNIFICANT CHANGE UP (ref 10–40)
AST SERPL-CCNC: 19 U/L — SIGNIFICANT CHANGE UP (ref 10–40)
BASOPHILS # BLD AUTO: 0 K/UL — SIGNIFICANT CHANGE UP (ref 0–0.2)
BASOPHILS NFR BLD AUTO: 0 % — SIGNIFICANT CHANGE UP (ref 0–2)
BCR/ABL BY RT - PCR QUANTITATIVE: SIGNIFICANT CHANGE UP
BILIRUB SERPL-MCNC: 0.5 MG/DL — SIGNIFICANT CHANGE UP (ref 0.2–1.2)
BILIRUB SERPL-MCNC: 0.6 MG/DL — SIGNIFICANT CHANGE UP (ref 0.2–1.2)
BUN SERPL-MCNC: 15 MG/DL — SIGNIFICANT CHANGE UP (ref 7–23)
BUN SERPL-MCNC: 16 MG/DL — SIGNIFICANT CHANGE UP (ref 7–23)
CALCIUM SERPL-MCNC: 9.2 MG/DL — SIGNIFICANT CHANGE UP (ref 8.4–10.5)
CALCIUM SERPL-MCNC: 9.6 MG/DL — SIGNIFICANT CHANGE UP (ref 8.4–10.5)
CHLORIDE SERPL-SCNC: 105 MMOL/L — SIGNIFICANT CHANGE UP (ref 96–108)
CHLORIDE SERPL-SCNC: 106 MMOL/L — SIGNIFICANT CHANGE UP (ref 96–108)
CO2 SERPL-SCNC: 19 MMOL/L — LOW (ref 22–31)
CO2 SERPL-SCNC: 20 MMOL/L — LOW (ref 22–31)
CREAT SERPL-MCNC: 0.88 MG/DL — SIGNIFICANT CHANGE UP (ref 0.5–1.3)
CREAT SERPL-MCNC: 0.88 MG/DL — SIGNIFICANT CHANGE UP (ref 0.5–1.3)
D DIMER BLD IA.RAPID-MCNC: 267 NG/ML DDU — HIGH
D DIMER BLD IA.RAPID-MCNC: 751 NG/ML DDU — HIGH
EOSINOPHIL # BLD AUTO: 0.01 K/UL — SIGNIFICANT CHANGE UP (ref 0–0.5)
EOSINOPHIL NFR BLD AUTO: 0.5 % — SIGNIFICANT CHANGE UP (ref 0–6)
FIBRINOGEN PPP-MCNC: 598 MG/DL — HIGH (ref 320–500)
FIBRINOGEN PPP-MCNC: 658 MG/DL — HIGH (ref 350–510)
GLUCOSE SERPL-MCNC: 149 MG/DL — HIGH (ref 70–99)
GLUCOSE SERPL-MCNC: 95 MG/DL — SIGNIFICANT CHANGE UP (ref 70–99)
HCT VFR BLD CALC: 20.8 % — CRITICAL LOW (ref 39–50)
HCT VFR BLD CALC: 23 % — LOW (ref 39–50)
HGB BLD-MCNC: 7.2 G/DL — LOW (ref 13–17)
HGB BLD-MCNC: 8.2 G/DL — LOW (ref 13–17)
INR BLD: 1.15 RATIO — SIGNIFICANT CHANGE UP (ref 0.88–1.16)
INR BLD: 1.25 RATIO — HIGH (ref 0.88–1.16)
LDH SERPL L TO P-CCNC: 114 U/L — SIGNIFICANT CHANGE UP (ref 50–242)
LDH SERPL L TO P-CCNC: 163 U/L — SIGNIFICANT CHANGE UP (ref 50–242)
LYMPHOCYTES # BLD AUTO: 1.66 K/UL — SIGNIFICANT CHANGE UP (ref 1–3.3)
LYMPHOCYTES # BLD AUTO: 90.7 % — HIGH (ref 13–44)
MAGNESIUM SERPL-MCNC: 2 MG/DL — SIGNIFICANT CHANGE UP (ref 1.6–2.6)
MAGNESIUM SERPL-MCNC: 2.3 MG/DL — SIGNIFICANT CHANGE UP (ref 1.6–2.6)
MCHC RBC-ENTMCNC: 30.4 PG — SIGNIFICANT CHANGE UP (ref 27–34)
MCHC RBC-ENTMCNC: 30.8 PG — SIGNIFICANT CHANGE UP (ref 27–34)
MCHC RBC-ENTMCNC: 34.6 GM/DL — SIGNIFICANT CHANGE UP (ref 32–36)
MCHC RBC-ENTMCNC: 35.7 GM/DL — SIGNIFICANT CHANGE UP (ref 32–36)
MCV RBC AUTO: 86.5 FL — SIGNIFICANT CHANGE UP (ref 80–100)
MCV RBC AUTO: 87.8 FL — SIGNIFICANT CHANGE UP (ref 80–100)
MONOCYTES # BLD AUTO: 0.06 K/UL — SIGNIFICANT CHANGE UP (ref 0–0.9)
MONOCYTES NFR BLD AUTO: 3.3 % — SIGNIFICANT CHANGE UP (ref 2–14)
NEUTROPHILS # BLD AUTO: 0.1 K/UL — LOW (ref 1.8–7.4)
NEUTROPHILS NFR BLD AUTO: 5.5 % — LOW (ref 43–77)
NRBC # BLD: 0 /100 WBCS — SIGNIFICANT CHANGE UP (ref 0–0)
NRBC # BLD: 0 /100 WBCS — SIGNIFICANT CHANGE UP (ref 0–0)
PHOSPHATE SERPL-MCNC: 2.1 MG/DL — LOW (ref 2.5–4.5)
PHOSPHATE SERPL-MCNC: 4.1 MG/DL — SIGNIFICANT CHANGE UP (ref 2.5–4.5)
PLATELET # BLD AUTO: 33 K/UL — LOW (ref 150–400)
PLATELET # BLD AUTO: 64 K/UL — LOW (ref 150–400)
POTASSIUM SERPL-MCNC: 3.9 MMOL/L — SIGNIFICANT CHANGE UP (ref 3.5–5.3)
POTASSIUM SERPL-MCNC: 4.2 MMOL/L — SIGNIFICANT CHANGE UP (ref 3.5–5.3)
POTASSIUM SERPL-SCNC: 3.9 MMOL/L — SIGNIFICANT CHANGE UP (ref 3.5–5.3)
POTASSIUM SERPL-SCNC: 4.2 MMOL/L — SIGNIFICANT CHANGE UP (ref 3.5–5.3)
PROT SERPL-MCNC: 6.9 G/DL — SIGNIFICANT CHANGE UP (ref 6–8.3)
PROT SERPL-MCNC: 7.8 G/DL — SIGNIFICANT CHANGE UP (ref 6–8.3)
PROTHROM AB SERPL-ACNC: 13.3 SEC — HIGH (ref 10–12.9)
PROTHROM AB SERPL-ACNC: 14.4 SEC — HIGH (ref 10–12.9)
RBC # BLD: 2.37 M/UL — LOW (ref 4.2–5.8)
RBC # BLD: 2.66 M/UL — LOW (ref 4.2–5.8)
RBC # FLD: 12.5 % — SIGNIFICANT CHANGE UP (ref 10.3–14.5)
RBC # FLD: 12.9 % — SIGNIFICANT CHANGE UP (ref 10.3–14.5)
SODIUM SERPL-SCNC: 139 MMOL/L — SIGNIFICANT CHANGE UP (ref 135–145)
SODIUM SERPL-SCNC: 139 MMOL/L — SIGNIFICANT CHANGE UP (ref 135–145)
URATE SERPL-MCNC: 2.8 MG/DL — LOW (ref 3.4–8.8)
URATE SERPL-MCNC: 3.2 MG/DL — LOW (ref 3.4–8.8)
WBC # BLD: 0.14 K/UL — CRITICAL LOW (ref 3.8–10.5)
WBC # BLD: 1.83 K/UL — LOW (ref 3.8–10.5)
WBC # FLD AUTO: 0.14 K/UL — CRITICAL LOW (ref 3.8–10.5)
WBC # FLD AUTO: 1.83 K/UL — LOW (ref 3.8–10.5)

## 2020-04-23 PROCEDURE — 99232 SBSQ HOSP IP/OBS MODERATE 35: CPT

## 2020-04-23 RX ORDER — ACYCLOVIR SODIUM 500 MG
400 VIAL (EA) INTRAVENOUS EVERY 8 HOURS
Refills: 0 | Status: DISCONTINUED | OUTPATIENT
Start: 2020-04-23 | End: 2020-05-20

## 2020-04-23 RX ORDER — DIPHENHYDRAMINE HCL 50 MG
50 CAPSULE ORAL ONCE
Refills: 0 | Status: COMPLETED | OUTPATIENT
Start: 2020-04-23 | End: 2020-04-23

## 2020-04-23 RX ORDER — ELTROMBOPAG OLAMINE 50 MG/1
150 TABLET, FILM COATED ORAL DAILY
Refills: 0 | Status: DISCONTINUED | OUTPATIENT
Start: 2020-04-23 | End: 2020-05-01

## 2020-04-23 RX ORDER — DIPHENHYDRAMINE HCL 50 MG
50 CAPSULE ORAL DAILY
Refills: 0 | Status: COMPLETED | OUTPATIENT
Start: 2020-04-23 | End: 2020-04-26

## 2020-04-23 RX ORDER — ACETAMINOPHEN 500 MG
650 TABLET ORAL ONCE
Refills: 0 | Status: COMPLETED | OUTPATIENT
Start: 2020-04-23 | End: 2020-04-23

## 2020-04-23 RX ORDER — ATOVAQUONE 750 MG/5ML
750 SUSPENSION ORAL EVERY 12 HOURS
Refills: 0 | Status: DISCONTINUED | OUTPATIENT
Start: 2020-04-23 | End: 2020-05-20

## 2020-04-23 RX ORDER — ACETAMINOPHEN 500 MG
650 TABLET ORAL DAILY
Refills: 0 | Status: COMPLETED | OUTPATIENT
Start: 2020-04-23 | End: 2020-04-26

## 2020-04-23 RX ORDER — CYCLOSPORINE 100 MG/1
485 CAPSULE ORAL EVERY 12 HOURS
Refills: 0 | Status: DISCONTINUED | OUTPATIENT
Start: 2020-04-23 | End: 2020-04-30

## 2020-04-23 RX ADMIN — Medication 5 MILLILITER(S): at 11:20

## 2020-04-23 RX ADMIN — Medication 650 MILLIGRAM(S): at 11:20

## 2020-04-23 RX ADMIN — Medication 100 MILLIGRAM(S): at 10:46

## 2020-04-23 RX ADMIN — Medication 5 MILLILITER(S): at 08:20

## 2020-04-23 RX ADMIN — Medication 300 MILLIGRAM(S): at 11:20

## 2020-04-23 RX ADMIN — ELTROMBOPAG OLAMINE 150 MILLIGRAM(S): 50 TABLET, FILM COATED ORAL at 11:21

## 2020-04-23 RX ADMIN — POSACONAZOLE 300 MILLIGRAM(S): 100 TABLET, DELAYED RELEASE ORAL at 11:20

## 2020-04-23 RX ADMIN — Medication 50 MILLIGRAM(S): at 20:08

## 2020-04-23 RX ADMIN — Medication 50 MILLIGRAM(S): at 10:46

## 2020-04-23 RX ADMIN — Medication 5 MILLILITER(S): at 20:09

## 2020-04-23 RX ADMIN — Medication 100 MILLIGRAM(S): at 11:50

## 2020-04-23 RX ADMIN — Medication 5 MILLILITER(S): at 22:56

## 2020-04-23 RX ADMIN — Medication 100 MILLIGRAM(S): at 20:21

## 2020-04-23 RX ADMIN — CYCLOSPORINE 485 MILLIGRAM(S): 100 CAPSULE ORAL at 18:20

## 2020-04-23 RX ADMIN — Medication 400 MILLIGRAM(S): at 14:22

## 2020-04-23 RX ADMIN — Medication 5 MILLILITER(S): at 15:26

## 2020-04-23 RX ADMIN — Medication 100 MILLIGRAM(S): at 18:21

## 2020-04-23 RX ADMIN — SODIUM CHLORIDE 75 MILLILITER(S): 9 INJECTION INTRAMUSCULAR; INTRAVENOUS; SUBCUTANEOUS at 04:45

## 2020-04-23 RX ADMIN — Medication 400 MILLIGRAM(S): at 20:10

## 2020-04-23 RX ADMIN — Medication 650 MILLIGRAM(S): at 15:26

## 2020-04-23 RX ADMIN — ATOVAQUONE 750 MILLIGRAM(S): 750 SUSPENSION ORAL at 18:20

## 2020-04-23 NOTE — PROGRESS NOTE ADULT - SUBJECTIVE AND OBJECTIVE BOX
Diagnosis: Aplastic anemia    Protocol/Chemo Regimen: ATG/Cyclosporine/Prednisone/Eltrombopag    Day: 1    Pt endorsed: slight blurry vision    Review of Systems: denies nausea, vomiting, diarrhea, chest pain, SOB    Pain scale: 0    Diet: regular    Allergies: No Known Allergies      ANTIMICROBIALS  levoFLOXacin  Tablet 500 milliGRAM(s) Oral every 24 hours  posaconazole DR Tablet 300 milliGRAM(s) Oral daily      STANDING MEDICATIONS  allopurinol 300 milliGRAM(s) Oral daily  antithymocyte globulin equine Skin Test 0.1 milliLiter(s) IntraDermal once  Biotene Dry Mouth Oral Rinse 5 milliLiter(s) Swish and Spit five times a day  sodium chloride 0.9%. 1000 milliLiter(s) IV Continuous <Continuous>      PRN MEDICATIONS  acetaminophen   Tablet .. 650 milliGRAM(s) Oral every 6 hours PRN  EPINEPHrine     1 mG/mL Injectable 0.3 milliGRAM(s) IntraMuscular once PRN  methylPREDNISolone sodium succinate IVPB 500 milliGRAM(s) IV Intermittent once PRN  ondansetron Injectable 4 milliGRAM(s) IV Push every 6 hours PRN      Vital Signs Last 24 Hrs  T(C): 36.5 (23 Apr 2020 05:55), Max: 36.9 (22 Apr 2020 16:45)  T(F): 97.7 (23 Apr 2020 05:55), Max: 98.5 (22 Apr 2020 16:45)  HR: 81 (23 Apr 2020 05:55) (80 - 95)  BP: 107/64 (23 Apr 2020 05:55) (107/64 - 141/70)  RR: 18 (23 Apr 2020 05:55) (18 - 18)  SpO2: 99% (23 Apr 2020 05:55) (98% - 99%)      PHYSICAL EXAM  General: adult in NAD  CV: normal S1, S2, RRR  Lungs: clear to auscultation, no wheezes, no rales  Abdomen: soft, nontender, nondistended, normal BS  Ext: no edema. L foreaem no skin reaction to ATG test dose  Skin: no rash  Neuro: alert and oriented x 3    CULTURES: no recent                 RADIOLOGY & ADDITIONAL STUDIES:    from: CT Head No Cont (04.17.20 @ 16:11)   IMPRESSION:   1.  No acute intracranial hemorrhage or mass effect.   2.  Paranasal sinusitis. Diagnosis: Aplastic anemia    Protocol/Chemo Regimen: ATG/Cyclosporine/Prednisone/Eltrombopag    Day: 1    Pt endorsed: blurry vision improved    Review of Systems: denies nausea, vomiting, diarrhea, chest pain, SOB    Pain scale: 0    Diet: regular    Allergies: No Known Allergies      ANTIMICROBIALS  levoFLOXacin  Tablet 500 milliGRAM(s) Oral every 24 hours  posaconazole DR Tablet 300 milliGRAM(s) Oral daily      STANDING MEDICATIONS  allopurinol 300 milliGRAM(s) Oral daily  antithymocyte globulin equine Skin Test 0.1 milliLiter(s) IntraDermal once  Biotene Dry Mouth Oral Rinse 5 milliLiter(s) Swish and Spit five times a day  sodium chloride 0.9%. 1000 milliLiter(s) IV Continuous <Continuous>      PRN MEDICATIONS  acetaminophen   Tablet .. 650 milliGRAM(s) Oral every 6 hours PRN  EPINEPHrine     1 mG/mL Injectable 0.3 milliGRAM(s) IntraMuscular once PRN  methylPREDNISolone sodium succinate IVPB 500 milliGRAM(s) IV Intermittent once PRN  ondansetron Injectable 4 milliGRAM(s) IV Push every 6 hours PRN      Vital Signs Last 24 Hrs  T(C): 36.5 (23 Apr 2020 05:55), Max: 36.9 (22 Apr 2020 16:45)  T(F): 97.7 (23 Apr 2020 05:55), Max: 98.5 (22 Apr 2020 16:45)  HR: 81 (23 Apr 2020 05:55) (80 - 95)  BP: 107/64 (23 Apr 2020 05:55) (107/64 - 141/70)  RR: 18 (23 Apr 2020 05:55) (18 - 18)  SpO2: 99% (23 Apr 2020 05:55) (98% - 99%)      PHYSICAL EXAM  General: adult in NAD  CV: normal S1, S2, RRR  Lungs: clear to auscultation, no wheezes, no rales  Abdomen: soft, nontender, nondistended, normal BS  Ext: no edema. L foreaem no skin reaction to ATG test dose  Skin: no rash  Neuro: alert and oriented x 3    CULTURES: no recent                          7.2    1.83  )-----------( 64       ( 23 Apr 2020 07:11 )             20.8     23 Apr 2020 07:11    139    |  106    |  15     ----------------------------<  95     4.2     |  20     |  0.88     Ca    9.2        23 Apr 2020 07:11  Phos  4.1       23 Apr 2020 07:11  Mg     2.3       23 Apr 2020 07:11    TPro  6.9    /  Alb  3.9    /  TBili  0.6    /  DBili  x      /  AST  13     /  ALT  24     /  AlkPhos  79     23 Apr 2020 07:11    PT/INR - ( 23 Apr 2020 07:11 )   PT: 13.3 sec;   INR: 1.15 ratio    PTT - ( 23 Apr 2020 07:11 )  PTT:28.5 sec    LIVER FUNCTIONS - ( 23 Apr 2020 07:11 )  Alb: 3.9 g/dL / Pro: 6.9 g/dL / ALK PHOS: 79 U/L / ALT: 24 U/L / AST: 13 U/L / GGT: x           Anti-Nuclear Antibody (04.21.20 @ 21:59)    Anti Nuclear Factor Titer: Negative    Vitamin B12, Serum (04.21.20 @ 22:14)    Vitamin B12, Serum: 325 pg/mL    Folic Acid, RBC (04.21.20 @ 19:25)    Hematocrit: 22.4 %    Folate, RBC: 1375 ng/mL      RADIOLOGY & ADDITIONAL STUDIES:    from: CT Head No Cont (04.17.20 @ 16:11)   IMPRESSION:   1.  No acute intracranial hemorrhage or mass effect.   2.  Paranasal sinusitis.

## 2020-04-23 NOTE — PROGRESS NOTE ADULT - PROBLEM SELECTOR PLAN 3
Intraretinal hemorrhage from thrombocytopenia + anemia  Transfuse to keep PLTs above 50k for now  Opthalmology [id number]:  Patient should follow up his/her ophthalmologist or in the [facility] within one week  [address]  [place] [address] [phone]

## 2020-04-23 NOTE — PROVIDER CONTACT NOTE (OTHER) - ASSESSMENT
Pt denies pain & SOB. Pt in no acute signs of distress. cluster of hives noted on bilateral arms & high. HR >100 Pt denies pain & SOB. Pt in no acute signs of distress. cluster of hives noted on bilateral arms & thighs. HR >100

## 2020-04-23 NOTE — PROGRESS NOTE ADULT - ASSESSMENT
35 y/o Male with no PMH p/w dizziness in setting of 2 weeks of fevers/chills, melena and easy bruising, found to be pancytopenic with Hb 3.6 and platelets of 2,000.  Patient has been transferred to [facility] from [facility] for further hematologic work-up. Bone Marrow biopsy performed 4/20 revealed Aplastic Anemia. Patient has pancytopenia secondary to disease process.

## 2020-04-23 NOTE — PROGRESS NOTE ADULT - ATTENDING COMMENTS
34M with no PMH presented to OSH with dizziness x 2 weeks, easy bruising x 2-4 weeks. At home had a syncopal event and called 911, found to have hemoglobin 3s, Platelets 2K, no evidence of DIC. Noted mild petechiae and has had gum bleeding. These have improved. Noted dark stools for 2 weeks. Peripheral smear with immature forms concerning for acute leukemia vs. aplastic anemia   -peripheral flow without blast population, FISH pending   -received ATRA 50 mg BID until PML-CHANTELLE negative   -Bone marrow evaluation(4/20) in conjunction with lab studies document severe aplastic anemia(no inciting factors identified).  I had lengthy discussion with patient regarding the diagnosis, prognosis and treatment recommendations at present time: hATG, Cyclosporine, Eltrombopag. Potential benefits as well as side effects of this combination regimen include(but not limited to) fever, chills, headache, dizziness, nausea, vomiting, diarrhea, musculoskeletal pains, liver dysfunction, and rare risk of serum sickness(ATG); kidney dysfunction, electrolyte abnormalities, hypertension, tremors, and with Eltrombopag- increased risk of blood clots if platelet count rapidly rises, liver dysfunction, new or worsening cataracts. Patient asked all of his questions and after all questions addressed, he made the decision to begin recommended treatment regimen. I explained that Prednisone use started with ATG is to prevent serum sickness.   - IVF, mouth care; discontinue Allopurinol  -receiving transfusions if Hgb< 7, Plt <50k   -optho consult given hazy vision, suspect retinal bleed given low platelets   -monitor for s/s of bleeding, gum bleeding intermittent   -Paranasal sinusitis on CT head(4/17)- cont Levaquin, begin Posaconazole; initiate Mepron and Acyclovir with immunosuppressive therapy. 34M with no PMH presented to OSH with dizziness x 2 weeks, easy bruising x 2-4 weeks. At home had a syncopal event and called 911, found to have hemoglobin 3s, Platelets 2K, no evidence of DIC. Noted mild petechiae and has had gum bleeding. These have improved. Noted dark stools for 2 weeks. Peripheral smear with immature forms concerning for acute leukemia vs. aplastic anemia   -peripheral flow without blast population, FISH pending   -received ATRA 50 mg BID until PML-CHANTELLE negative   -Bone marrow evaluation(4/20) in conjunction with lab studies document severe aplastic anemia(no inciting factors identified).  I had lengthy discussion with patient on 4/22/20 regarding the diagnosis, prognosis and treatment recommendations at present time: hATG, Cyclosporine, Eltrombopag. Potential benefits as well as side effects of this combination regimen include(but not limited to) fever, chills, headache, dizziness, nausea, vomiting, diarrhea, musculoskeletal pains, liver dysfunction, and rare risk of serum sickness(ATG); kidney dysfunction, electrolyte abnormalities, hypertension, tremors, and with Eltrombopag- increased risk of blood clots if platelet count rapidly rises, liver dysfunction, new or worsening cataracts. Patient asked all of his questions and after all questions addressed, he made the decision to begin recommended treatment regimen. I explained that Prednisone use started with ATG is to prevent serum sickness.   - ATG test dose negative. Begin ATG, CSA, Eltrombopag today  - IVF, mouth care; discontinue Allopurinol  -receiving transfusions if Hgb< 7, Plt <50k   -optho consult given hazy vision, suspect retinal bleed given low platelets   -monitor for s/s of bleeding, gum bleeding intermittent   -Paranasal sinusitis on CT head(4/17)- cont Levaquin, begin Posaconazole; initiate Mepron and Acyclovir with immunosuppressive therapy.

## 2020-04-23 NOTE — PROGRESS NOTE ADULT - PROBLEM SELECTOR PLAN 1
4/20 BM Bx consistent with aplastic anemia  s/p ATG Intradermal test dose 4/22 - no reaction  Plan to start h-ATG, Cyclosporine, Prednisone, Eltrombopag today:  Horse ATG 40mg/kg/day = 3880 mg daily x 4 days  Cyclosporine 10mg/kg/day divided BID = 485mg BID  Prednisone 1mg/kg/day = 100mg daily x 10 days, then taper, to prevent serum sickness  Eltrombopag 150mg daily  Pre-medication prior to ATG with Tylenol/Benadryl/Solu-medrol  Follow up CBC/TLS/DIC labs panel Q12  Strict I&Os, daily weights, mouth care  Continue Allopurinol 300mg PO daily  HLA typing sent out 4/20  CMV (-)  EBV, Parvovirus  HIV/Hepatitis panel negative  Sperm banking to patient prior to possible chemotherapy treatment regimen. D/w urologist Dr. Maximo England office. Tentatively scheduled for Wednesday am (4/22)   B12, folic acid and EMILIA sent 4/21 4/20 BM Bx consistent with aplastic anemia  s/p ATG Intradermal test dose 4/22 - no reaction  start h-ATG, Cyclosporine, Prednisone, Eltrombopag today:  Horse ATG 40mg/kg/day = 3880 mg daily x 4 days  Cyclosporine 10mg/kg/day divided BID = 485mg BID  Prednisone 1mg/kg/day = 100mg daily x 10 days, then taper, to prevent serum sickness  Eltrombopag 150mg daily  Pre-medication prior to ATG with Tylenol/Benadryl/Solu-medrol  Follow up CBC/TLS/DIC labs panel Q12  Strict I&Os, daily weights, mouth care  Continue Allopurinol 300mg PO daily  HLA typing sent out 4/20  CMV (-)  EBV, Parvovirus  HIV/Hepatitis panel negative  EMILIA negative

## 2020-04-23 NOTE — CHART NOTE - NSCHARTNOTEFT_GEN_A_CORE
Notified by RN pt with hives while on AGT infusion  Pt examined at a bedside, NAD, non-toxic appearing, A&O  Pt admits to itching on b/l foramrs+ elbow Notified by RN pt with hives while  AGT infusion  Pt examined at a bedside, NAD, non-toxic appearing, A&O  Pt admits to itching on b/l forearms+ elbow and b/l thighs  Pt declines, SOB, CP, abd pain, tongue swelling, HA,     PE:   GEN: NAD, Non-toxic, A&Ox4  CV: RRR  resp: CTA b/l  abd: soft, NT,NS  Ms: no edema, normal gait   neuro: grossly intact  skin: diffuse hives b/l forearms+ elbow and b/l thighs    33 y/o Male w Bone Marrow biopsy performed 4/20 revealed Aplastic Anemia. Pt on day 1 of  ATG/Cyclosporine/Prednisone/Eltrombopag  Now with itchiness and hives while AGT infusing( currently at a rate of 100cc/hr until finished)   Physical exam unremarkable other than hives  which were marked: will monitor for worsening overnight. No angioedema, lungs CTA.   - 50 mg Benadryl IVG administered  -100 mg Solumedrol IVF x1 administered  -rate decreased to 50cc/ hr: will stop infusion if sx worsen   - will monitor/ round frequently overnight  - will call allergy consult in AM  -d/w on-call fellow   - will endorse to day team in AM    Adri Gracia PA-C  BMTU

## 2020-04-23 NOTE — PROGRESS NOTE ADULT - PROBLEM SELECTOR PLAN 2
Patient is neutropenic, afebrile  Continue Levaquin and posaconazole for prophylaxis  Will need to add Mepron, Acyclovir tomorrow  If febrile, panculture and change Levaquin to Cefepime  COVID-19 surveillance 4/22 (-) negative Patient is neutropenic, afebrile  Continue Levaquin and posaconazole for prophylaxis  adding Mepron, Acyclovir today  If febrile, panculture and change Levaquin to Cefepime  COVID-19 surveillance 4/22 (-) negative

## 2020-04-23 NOTE — PROVIDER CONTACT NOTE (OTHER) - ACTION/TREATMENT ORDERED:
IV benadryl & solu-medrol, decrease ATG rate to 50 cc/hr until finished, marked hives on pt's body. continue to monitor

## 2020-04-24 LAB
ALBUMIN SERPL ELPH-MCNC: 4.1 G/DL — SIGNIFICANT CHANGE UP (ref 3.3–5)
ALP SERPL-CCNC: 88 U/L — SIGNIFICANT CHANGE UP (ref 40–120)
ALT FLD-CCNC: 33 U/L — SIGNIFICANT CHANGE UP (ref 10–45)
ANION GAP SERPL CALC-SCNC: 16 MMOL/L — SIGNIFICANT CHANGE UP (ref 5–17)
APTT BLD: 26.9 SEC — LOW (ref 27.5–36.3)
AST SERPL-CCNC: 20 U/L — SIGNIFICANT CHANGE UP (ref 10–40)
BILIRUB SERPL-MCNC: 0.7 MG/DL — SIGNIFICANT CHANGE UP (ref 0.2–1.2)
BLD GP AB SCN SERPL QL: NEGATIVE — SIGNIFICANT CHANGE UP
BUN SERPL-MCNC: 17 MG/DL — SIGNIFICANT CHANGE UP (ref 7–23)
CALCIUM SERPL-MCNC: 9.1 MG/DL — SIGNIFICANT CHANGE UP (ref 8.4–10.5)
CHLORIDE SERPL-SCNC: 104 MMOL/L — SIGNIFICANT CHANGE UP (ref 96–108)
CO2 SERPL-SCNC: 17 MMOL/L — LOW (ref 22–31)
CREAT SERPL-MCNC: 0.73 MG/DL — SIGNIFICANT CHANGE UP (ref 0.5–1.3)
D DIMER BLD IA.RAPID-MCNC: 2254 NG/ML DDU — HIGH
FIBRINOGEN PPP-MCNC: 649 MG/DL — HIGH (ref 320–500)
GLUCOSE SERPL-MCNC: 164 MG/DL — HIGH (ref 70–99)
HCT VFR BLD CALC: 21.1 % — LOW (ref 39–50)
HGB BLD-MCNC: 7.6 G/DL — LOW (ref 13–17)
INR BLD: 1.38 RATIO — HIGH (ref 0.88–1.16)
LDH SERPL L TO P-CCNC: 171 U/L — SIGNIFICANT CHANGE UP (ref 50–242)
MAGNESIUM SERPL-MCNC: 2 MG/DL — SIGNIFICANT CHANGE UP (ref 1.6–2.6)
MCHC RBC-ENTMCNC: 30.8 PG — SIGNIFICANT CHANGE UP (ref 27–34)
MCHC RBC-ENTMCNC: 36 GM/DL — SIGNIFICANT CHANGE UP (ref 32–36)
MCV RBC AUTO: 85.4 FL — SIGNIFICANT CHANGE UP (ref 80–100)
NRBC # BLD: 0 /100 WBCS — SIGNIFICANT CHANGE UP (ref 0–0)
PHOSPHATE SERPL-MCNC: 3.5 MG/DL — SIGNIFICANT CHANGE UP (ref 2.5–4.5)
PLATELET # BLD AUTO: 28 K/UL — LOW (ref 150–400)
POTASSIUM SERPL-MCNC: 4.2 MMOL/L — SIGNIFICANT CHANGE UP (ref 3.5–5.3)
POTASSIUM SERPL-SCNC: 4.2 MMOL/L — SIGNIFICANT CHANGE UP (ref 3.5–5.3)
PROT SERPL-MCNC: 7.4 G/DL — SIGNIFICANT CHANGE UP (ref 6–8.3)
PROTHROM AB SERPL-ACNC: 15.9 SEC — HIGH (ref 10–12.9)
RBC # BLD: 2.47 M/UL — LOW (ref 4.2–5.8)
RBC # FLD: 12.3 % — SIGNIFICANT CHANGE UP (ref 10.3–14.5)
RH IG SCN BLD-IMP: POSITIVE — SIGNIFICANT CHANGE UP
SODIUM SERPL-SCNC: 137 MMOL/L — SIGNIFICANT CHANGE UP (ref 135–145)
URATE SERPL-MCNC: 2.5 MG/DL — LOW (ref 3.4–8.8)
WBC # BLD: 0.22 K/UL — CRITICAL LOW (ref 3.8–10.5)
WBC # FLD AUTO: 0.22 K/UL — CRITICAL LOW (ref 3.8–10.5)

## 2020-04-24 PROCEDURE — 99232 SBSQ HOSP IP/OBS MODERATE 35: CPT

## 2020-04-24 RX ORDER — PHYTONADIONE (VIT K1) 5 MG
10 TABLET ORAL DAILY
Refills: 0 | Status: COMPLETED | OUTPATIENT
Start: 2020-04-24 | End: 2020-04-26

## 2020-04-24 RX ADMIN — CYCLOSPORINE 485 MILLIGRAM(S): 100 CAPSULE ORAL at 06:09

## 2020-04-24 RX ADMIN — SODIUM CHLORIDE 75 MILLILITER(S): 9 INJECTION INTRAMUSCULAR; INTRAVENOUS; SUBCUTANEOUS at 06:09

## 2020-04-24 RX ADMIN — ELTROMBOPAG OLAMINE 150 MILLIGRAM(S): 50 TABLET, FILM COATED ORAL at 12:14

## 2020-04-24 RX ADMIN — ATOVAQUONE 750 MILLIGRAM(S): 750 SUSPENSION ORAL at 18:33

## 2020-04-24 RX ADMIN — Medication 400 MILLIGRAM(S): at 06:08

## 2020-04-24 RX ADMIN — POSACONAZOLE 300 MILLIGRAM(S): 100 TABLET, DELAYED RELEASE ORAL at 12:14

## 2020-04-24 RX ADMIN — Medication 5 MILLILITER(S): at 12:14

## 2020-04-24 RX ADMIN — Medication 300 MILLIGRAM(S): at 12:14

## 2020-04-24 RX ADMIN — CYCLOSPORINE 485 MILLIGRAM(S): 100 CAPSULE ORAL at 18:34

## 2020-04-24 RX ADMIN — Medication 100 MILLIGRAM(S): at 04:31

## 2020-04-24 RX ADMIN — Medication 100 MILLIGRAM(S): at 21:13

## 2020-04-24 RX ADMIN — Medication 5 MILLILITER(S): at 23:04

## 2020-04-24 RX ADMIN — Medication 10 MILLIGRAM(S): at 18:33

## 2020-04-24 RX ADMIN — Medication 650 MILLIGRAM(S): at 15:12

## 2020-04-24 RX ADMIN — Medication 62.5 MILLIMOLE(S): at 04:33

## 2020-04-24 RX ADMIN — Medication 50 MILLIGRAM(S): at 15:15

## 2020-04-24 RX ADMIN — Medication 100 MILLIGRAM(S): at 06:08

## 2020-04-24 RX ADMIN — Medication 400 MILLIGRAM(S): at 21:13

## 2020-04-24 RX ADMIN — Medication 5 MILLILITER(S): at 20:09

## 2020-04-24 RX ADMIN — Medication 100 MILLIGRAM(S): at 15:11

## 2020-04-24 RX ADMIN — ATOVAQUONE 750 MILLIGRAM(S): 750 SUSPENSION ORAL at 06:08

## 2020-04-24 RX ADMIN — Medication 400 MILLIGRAM(S): at 15:12

## 2020-04-24 NOTE — PROGRESS NOTE ADULT - PROBLEM SELECTOR PLAN 3
Intraretinal hemorrhage from thrombocytopenia + anemia  Transfuse to keep PLTs above 50k for now  Opthalmology [id number]:  Patient should follow up his/her ophthalmologist or in the [facility] within one week  [address]  [place] [address] [phone] Intraretinal hemorrhage from thrombocytopenia + anemia  Transfuse to keep PLTs above 50k for now  Opthalmology [id number]:  Patient should follow up his/her ophthalmologist or in the [facility] within one week of discharge.    [address]  [place], [place] [id number] [phone]

## 2020-04-24 NOTE — DIETITIAN INITIAL EVALUATION ADULT. - OTHER INFO
Diet PTA: Pt reports eating well with good appetite at home, reports consuming 5 meals per day with no recent changes in appetite or intake. Pt does not follow any formal dietary restrictions at home.    Pt reports UBW of 212 lbs, pt seems to have had slight weight gain to current admission weight 215.4 lbs (4/18), further weight gain in house 219.9 lbs (4/23), continue to monitor.    Pt with no food allergies, no micronutrient supplementation PTA. No N+V, last BM this morning. No difficulty chewing/swallowing.     RD reviewed importance of adequate po intake with overall goal for weight maintenance, pt requesting protein shake.

## 2020-04-24 NOTE — DIETITIAN INITIAL EVALUATION ADULT. - PHYSICAL APPEARANCE
other (specify)/well nourished/obese Ht: 5'9", Wt: 215.4 lbs, BMI: 31.8 kg/m2, IBW: 160 lbs +/- 10%, %IBW: 135%  No edema, Skin intact per nursing flowsheets

## 2020-04-24 NOTE — PROGRESS NOTE ADULT - ATTENDING COMMENTS
34M with no PMH presented to OSH with dizziness x 2 weeks, easy bruising x 2-4 weeks. At home had a syncopal event and called 911, found to have hemoglobin 3s, Platelets 2K, no evidence of DIC. Noted mild petechiae and has had gum bleeding. These have improved. Noted dark stools for 2 weeks. Peripheral smear with immature forms concerning for acute leukemia vs. aplastic anemia   -peripheral flow without blast population, FISH pending   -received ATRA 50 mg BID until PML-CHANTELLE negative   -Bone marrow evaluation(4/20) in conjunction with lab studies document severe aplastic anemia(no inciting factors identified).  I had lengthy discussion with patient on 4/22/20 regarding the diagnosis, prognosis and treatment recommendations at present time: hATG, Cyclosporine, Eltrombopag. Potential benefits as well as side effects of this combination regimen include(but not limited to) fever, chills, headache, dizziness, nausea, vomiting, diarrhea, musculoskeletal pains, liver dysfunction, and rare risk of serum sickness(ATG); kidney dysfunction, electrolyte abnormalities, hypertension, tremors, and with Eltrombopag- increased risk of blood clots if platelet count rapidly rises, liver dysfunction, new or worsening cataracts. Patient asked all of his questions and after all questions addressed, he made the decision to begin recommended treatment regimen. I explained that Prednisone use started with ATG is to prevent serum sickness.   - ATG test dose negative. Begin ATG, CSA, Eltrombopag today  - IVF, mouth care; discontinue Allopurinol  -receiving transfusions if Hgb< 7, Plt <50k   -optho consult given hazy vision, suspect retinal bleed given low platelets   -monitor for s/s of bleeding, gum bleeding intermittent   -Paranasal sinusitis on CT head(4/17)- cont Levaquin, begin Posaconazole; initiate Mepron and Acyclovir with immunosuppressive therapy. 34M with no PMH presented to OSH with dizziness x 2 weeks, easy bruising x 2-4 weeks. At home had a syncopal event and called 911, found to have hemoglobin 3s, Platelets 2K, no evidence of DIC. Noted mild petechiae and has had gum bleeding. These have improved. Noted dark stools for 2 weeks. Peripheral smear with immature forms concerning for acute leukemia vs. aplastic anemia   -peripheral flow without blast population, FISH pending   -received ATRA 50 mg BID until PML-CHANTELLE negative   -Bone marrow evaluation(4/20) in conjunction with lab studies document severe aplastic anemia(no inciting factors identified).  I had lengthy discussion with patient on 4/22/20 regarding the diagnosis, prognosis and treatment recommendations at present time: hATG, Cyclosporine, Eltrombopag. Potential benefits as well as side effects of this combination regimen include(but not limited to) fever, chills, headache, dizziness, nausea, vomiting, diarrhea, musculoskeletal pains, liver dysfunction, and rare risk of serum sickness(ATG); kidney dysfunction, electrolyte abnormalities, hypertension, tremors, and with Eltrombopag- increased risk of blood clots if platelet count rapidly rises, liver dysfunction, new or worsening cataracts. Patient asked all of his questions and after all questions addressed, he made the decision to begin recommended treatment regimen. I explained that Prednisone use started with ATG is to prevent serum sickness.   - ATG test dose negative. Begin ATG, CSA, Eltrombopag on 4/23  -patient developed hives and short episode of wheezing with ATG infusion, which resolved- will continue Solumedrol pre-med with ATG(hold Prednisone), then resume Prednisone on day 5 to complete recommended course for prevention of serum sickness.  - IVF, mouth care; discontinued Allopurinol  -receiving transfusions if Hgb< 7, Plt <50k   -optho consult given hazy vision, suspect retinal bleed given low platelets   -monitor for s/s of bleeding, gum bleeding intermittent   -Paranasal sinusitis on CT head(4/17)- cont Levaquin, begin Posaconazole; initiate Mepron and Acyclovir with immunosuppressive therapy.

## 2020-04-24 NOTE — PROGRESS NOTE ADULT - ASSESSMENT
35 y/o Male with no PMH p/w dizziness in setting of 2 weeks of fevers/chills, melena and easy bruising, found to be pancytopenic with Hb 3.6 and platelets of 2,000.  Patient has been transferred to [facility] from [facility] for further hematologic work-up. Bone Marrow biopsy performed 4/20 revealed Aplastic Anemia. Patient has pancytopenia secondary to disease process.

## 2020-04-24 NOTE — PROGRESS NOTE ADULT - PROBLEM SELECTOR PLAN 2
Patient is neutropenic, afebrile  Continue Levaquin and posaconazole for prophylaxis  adding Mepron, Acyclovir today  If febrile, panculture and change Levaquin to Cefepime  COVID-19 surveillance 4/22 (-) negative Patient is neutropenic, afebrile  Continue Levaquin and posaconazole for prophylaxis  adding Mepron, Acyclovir.   If febrile, panculture and change Levaquin to Cefepime  COVID-19 surveillance 4/22 (-) negative

## 2020-04-24 NOTE — PROGRESS NOTE ADULT - SUBJECTIVE AND OBJECTIVE BOX
Diagnosis: Aplastic anemia    Protocol/Chemo Regimen: ATG/Cyclosporine/Prednisone/Eltrombopag    Day: 2    Pt endorsed: blurry vision improved    Review of Systems: denies nausea, vomiting, diarrhea, chest pain, SOB    Pain scale: 0    Diet: regular    Allergies    No Known Allergies    Intolerances        ANTIMICROBIALS  acyclovir   Oral Tab/Cap 400 milliGRAM(s) Oral every 8 hours  atovaquone Suspension 750 milliGRAM(s) Oral every 12 hours  levoFLOXacin  Tablet 500 milliGRAM(s) Oral every 24 hours  posaconazole DR Tablet 300 milliGRAM(s) Oral daily      HEME/ONC MEDICATIONS  eltrombopag 150 milliGRAM(s) Oral daily      STANDING MEDICATIONS  acetaminophen   Tablet .. 650 milliGRAM(s) Oral daily  allopurinol 300 milliGRAM(s) Oral daily  antithymocyte globulin equine IVPB 3880 milliGRAM(s) IV Intermittent daily  antithymocyte globulin equine Skin Test 0.1 milliLiter(s) IntraDermal once  Biotene Dry Mouth Oral Rinse 5 milliLiter(s) Swish and Spit five times a day  cycloSPORINE  (SandIMMUNE)  Solution 485 milliGRAM(s) Oral every 12 hours  diphenhydrAMINE   Injectable 50 milliGRAM(s) IV Push daily  methylPREDNISolone sodium succinate Injectable 100 milliGRAM(s) IV Push every 8 hours  predniSONE   Tablet 100 milliGRAM(s) Oral daily  sodium chloride 0.9%. 1000 milliLiter(s) IV Continuous <Continuous>      PRN MEDICATIONS  acetaminophen   Tablet .. 650 milliGRAM(s) Oral every 6 hours PRN  EPINEPHrine     1 mG/mL Injectable 0.3 milliGRAM(s) IntraMuscular once PRN  methylPREDNISolone sodium succinate IVPB 500 milliGRAM(s) IV Intermittent once PRN  ondansetron Injectable 4 milliGRAM(s) IV Push every 6 hours PRN        Vital Signs Last 24 Hrs  T(C): 36.1 (24 Apr 2020 06:00), Max: 37.7 (23 Apr 2020 21:20)  T(F): 97 (24 Apr 2020 06:00), Max: 99.8 (23 Apr 2020 21:20)  HR: 87 (24 Apr 2020 06:00) (74 - 108)  BP: 131/84 (24 Apr 2020 06:00) (108/66 - 161/98)  BP(mean): --  RR: 17 (24 Apr 2020 06:00) (16 - 18)  SpO2: 99% (24 Apr 2020 06:00) (95% - 100%)     PHYSICAL EXAM  General: adult in NAD  CV: normal S1, S2, RRR  Lungs: clear to auscultation, no wheezes, no rales  Abdomen: soft, nontender, nondistended, normal BS  Ext: no edema. L foreaem no skin reaction to ATG test dose  Skin: no rash  Neuro: alert and oriented x 3    RECENT CULTURES:        LABS:                        7.6    0.22  )-----------( 28       ( 24 Apr 2020 04:48 )             21.1         Mean Cell Volume : 85.4 fl  Mean Cell Hemoglobin : 30.8 pg  Mean Cell Hemoglobin Concentration : 36.0 gm/dL  Auto Neutrophil # : x  Auto Lymphocyte # : x  Auto Monocyte # : x  Auto Eosinophil # : x  Auto Basophil # : x  Auto Neutrophil % : x  Auto Lymphocyte % : x  Auto Monocyte % : x  Auto Eosinophil % : x  Auto Basophil % : x      04-24    137  |  104  |  17  ----------------------------<  164<H>  4.2   |  17<L>  |  0.73    Ca    9.1      24 Apr 2020 04:48  Phos  3.5     04-24  Mg     2.0     04-24    TPro  7.4  /  Alb  4.1  /  TBili  0.7  /  DBili  x   /  AST  20  /  ALT  33  /  AlkPhos  88  04-24      Mg 2.0  Phos 3.5  Mg 2.0  Phos 2.1      PT/INR - ( 24 Apr 2020 04:48 )   PT: 15.9 sec;   INR: 1.38 ratio         PTT - ( 24 Apr 2020 04:48 )  PTT:26.9 sec      Uric Acid 2.5      Uric Acid 2.8        RADIOLOGY & ADDITIONAL STUDIES: Diagnosis: Aplastic anemia    Protocol/Chemo Regimen: ATG/Cyclosporine/Prednisone/Eltrombopag    Day: 2    Pt endorsed: hives from ATG infusion. no other complaints.     Review of Systems: denies nausea, vomiting, diarrhea, chest pain, SOB    Pain scale: 0    Diet: regular    Allergies    No Known Allergies    Intolerances        ANTIMICROBIALS  acyclovir   Oral Tab/Cap 400 milliGRAM(s) Oral every 8 hours  atovaquone Suspension 750 milliGRAM(s) Oral every 12 hours  levoFLOXacin  Tablet 500 milliGRAM(s) Oral every 24 hours  posaconazole DR Tablet 300 milliGRAM(s) Oral daily      HEME/ONC MEDICATIONS  eltrombopag 150 milliGRAM(s) Oral daily      STANDING MEDICATIONS  acetaminophen   Tablet .. 650 milliGRAM(s) Oral daily  allopurinol 300 milliGRAM(s) Oral daily  antithymocyte globulin equine IVPB 3880 milliGRAM(s) IV Intermittent daily  antithymocyte globulin equine Skin Test 0.1 milliLiter(s) IntraDermal once  Biotene Dry Mouth Oral Rinse 5 milliLiter(s) Swish and Spit five times a day  cycloSPORINE  (SandIMMUNE)  Solution 485 milliGRAM(s) Oral every 12 hours  diphenhydrAMINE   Injectable 50 milliGRAM(s) IV Push daily  methylPREDNISolone sodium succinate Injectable 100 milliGRAM(s) IV Push every 8 hours  predniSONE   Tablet 100 milliGRAM(s) Oral daily  sodium chloride 0.9%. 1000 milliLiter(s) IV Continuous <Continuous>      PRN MEDICATIONS  acetaminophen   Tablet .. 650 milliGRAM(s) Oral every 6 hours PRN  EPINEPHrine     1 mG/mL Injectable 0.3 milliGRAM(s) IntraMuscular once PRN  methylPREDNISolone sodium succinate IVPB 500 milliGRAM(s) IV Intermittent once PRN  ondansetron Injectable 4 milliGRAM(s) IV Push every 6 hours PRN        Vital Signs Last 24 Hrs  T(C): 36.1 (24 Apr 2020 06:00), Max: 37.7 (23 Apr 2020 21:20)  T(F): 97 (24 Apr 2020 06:00), Max: 99.8 (23 Apr 2020 21:20)  HR: 87 (24 Apr 2020 06:00) (74 - 108)  BP: 131/84 (24 Apr 2020 06:00) (108/66 - 161/98)  BP(mean): --  RR: 17 (24 Apr 2020 06:00) (16 - 18)  SpO2: 99% (24 Apr 2020 06:00) (95% - 100%)     PHYSICAL EXAM  General: adult in NAD  CV: normal S1, S2, RRR  Lungs: clear to auscultation, no wheezes, no rales  Abdomen: soft, nontender, nondistended, normal BS  Ext: no edema. L foreaem no skin reaction to ATG test dose  Skin: no rash  Neuro: alert and oriented x 3    RECENT CULTURES:        LABS:                        7.6    0.22  )-----------( 28       ( 24 Apr 2020 04:48 )             21.1         Mean Cell Volume : 85.4 fl  Mean Cell Hemoglobin : 30.8 pg  Mean Cell Hemoglobin Concentration : 36.0 gm/dL  Auto Neutrophil # : x  Auto Lymphocyte # : x  Auto Monocyte # : x  Auto Eosinophil # : x  Auto Basophil # : x  Auto Neutrophil % : x  Auto Lymphocyte % : x  Auto Monocyte % : x  Auto Eosinophil % : x  Auto Basophil % : x      04-24    137  |  104  |  17  ----------------------------<  164<H>  4.2   |  17<L>  |  0.73    Ca    9.1      24 Apr 2020 04:48  Phos  3.5     04-24  Mg     2.0     04-24    TPro  7.4  /  Alb  4.1  /  TBili  0.7  /  DBili  x   /  AST  20  /  ALT  33  /  AlkPhos  88  04-24      Mg 2.0  Phos 3.5  Mg 2.0  Phos 2.1      PT/INR - ( 24 Apr 2020 04:48 )   PT: 15.9 sec;   INR: 1.38 ratio         PTT - ( 24 Apr 2020 04:48 )  PTT:26.9 sec      Uric Acid 2.5      Uric Acid 2.8        RADIOLOGY & ADDITIONAL STUDIES:

## 2020-04-24 NOTE — PROGRESS NOTE ADULT - PROBLEM SELECTOR PLAN 1
4/20 BM Bx consistent with aplastic anemia  s/p ATG Intradermal test dose 4/22 - no reaction  start h-ATG, Cyclosporine, Prednisone, Eltrombopag today:  Horse ATG 40mg/kg/day = 3880 mg daily x 4 days  Cyclosporine 10mg/kg/day divided BID = 485mg BID  Prednisone 1mg/kg/day = 100mg daily x 10 days, then taper, to prevent serum sickness  Eltrombopag 150mg daily  Pre-medication prior to ATG with Tylenol/Benadryl/Solu-medrol  Follow up CBC/TLS/DIC labs panel Q12  Strict I&Os, daily weights, mouth care  Continue Allopurinol 300mg PO daily  HLA typing sent out 4/20  CMV (-)  EBV, Parvovirus  HIV/Hepatitis panel negative  EMILIA negative 4/20 BM Bx consistent with aplastic anemia  s/p ATG Intradermal test dose 4/22 - no reaction  start h-ATG, Cyclosporine, Prednisone, Eltrombopag 4/23  Horse ATG 40mg/kg/day = 3880 mg daily x 4 days  Cyclosporine 10mg/kg/day divided BID = 485mg BID  Prednisone 1mg/kg/day = 100mg daily x 10 days, then taper, to prevent serum sickness  Eltrombopag 150mg daily  Pre-medication prior to ATG with Tylenol/Benadryl/Solu-medrol  Strict I&Os, daily weights, mouth care  HLA typing sent out 4/20  CMV (-)  EBV,   Parvovirus (-)  HIV/Hepatitis panel negative  EMILIA negative  s/p reaction from ATG on 4/23 (hives) 4/20 BM Bx consistent with aplastic anemia  s/p ATG Intradermal test dose 4/22 - no reaction  start h-ATG, Cyclosporine, Prednisone, Eltrombopag 4/23  Horse ATG 40mg/kg/day = 3880 mg daily x 4 days  Cyclosporine 10mg/kg/day divided BID = 485mg BID  Eltrombopag 150mg daily  Strict I&Os, daily weights, mouth care  HLA typing sent out 4/20  CMV (-)  EBV,   Parvovirus (-)  HIV/Hepatitis panel negative  EMILIA negative  s/p reaction from ATG on 4/23 (hives)  4/24 will leave solumedrol 100mg IV q8 for the 4 days with ATG. Will d/c prednisione and start prednisone on day 5-10  (4/27-5/2) and then order taper.

## 2020-04-24 NOTE — DIETITIAN INITIAL EVALUATION ADULT. - REASON INDICATOR FOR ASSESSMENT
Length of stay. Pt with newly diagnosed aplastic anemia receiving treatment.    Information obtained from pt.

## 2020-04-25 LAB
ALBUMIN SERPL ELPH-MCNC: 4 G/DL — SIGNIFICANT CHANGE UP (ref 3.3–5)
ALP SERPL-CCNC: 74 U/L — SIGNIFICANT CHANGE UP (ref 40–120)
ALT FLD-CCNC: 38 U/L — SIGNIFICANT CHANGE UP (ref 10–45)
ANION GAP SERPL CALC-SCNC: 15 MMOL/L — SIGNIFICANT CHANGE UP (ref 5–17)
APTT BLD: 25.1 SEC — LOW (ref 27.5–36.3)
AST SERPL-CCNC: 17 U/L — SIGNIFICANT CHANGE UP (ref 10–40)
BILIRUB SERPL-MCNC: 0.6 MG/DL — SIGNIFICANT CHANGE UP (ref 0.2–1.2)
BUN SERPL-MCNC: 17 MG/DL — SIGNIFICANT CHANGE UP (ref 7–23)
CALCIUM SERPL-MCNC: 9.2 MG/DL — SIGNIFICANT CHANGE UP (ref 8.4–10.5)
CHLORIDE SERPL-SCNC: 103 MMOL/L — SIGNIFICANT CHANGE UP (ref 96–108)
CO2 SERPL-SCNC: 21 MMOL/L — LOW (ref 22–31)
COMMENT - PATHOLOGY: SIGNIFICANT CHANGE UP
CREAT SERPL-MCNC: 0.61 MG/DL — SIGNIFICANT CHANGE UP (ref 0.5–1.3)
D DIMER BLD IA.RAPID-MCNC: 588 NG/ML DDU — HIGH
GLUCOSE SERPL-MCNC: 164 MG/DL — HIGH (ref 70–99)
HCT VFR BLD CALC: 19.4 % — CRITICAL LOW (ref 39–50)
HGB BLD-MCNC: 6.9 G/DL — CRITICAL LOW (ref 13–17)
INR BLD: 1.32 RATIO — HIGH (ref 0.88–1.16)
LDH SERPL L TO P-CCNC: 155 U/L — SIGNIFICANT CHANGE UP (ref 50–242)
MAGNESIUM SERPL-MCNC: 2.2 MG/DL — SIGNIFICANT CHANGE UP (ref 1.6–2.6)
MCHC RBC-ENTMCNC: 30.5 PG — SIGNIFICANT CHANGE UP (ref 27–34)
MCHC RBC-ENTMCNC: 35.6 GM/DL — SIGNIFICANT CHANGE UP (ref 32–36)
MCV RBC AUTO: 85.8 FL — SIGNIFICANT CHANGE UP (ref 80–100)
NRBC # BLD: 0 /100 WBCS — SIGNIFICANT CHANGE UP (ref 0–0)
PHOSPHATE SERPL-MCNC: 3.2 MG/DL — SIGNIFICANT CHANGE UP (ref 2.5–4.5)
PLATELET # BLD AUTO: 29 K/UL — LOW (ref 150–400)
PNH RBC-COMPLETE AG LOSS: 0.01 % — SIGNIFICANT CHANGE UP (ref 0–0.01)
PNH RBC-PARTIAL AG LOSS: 0.01 % — SIGNIFICANT CHANGE UP (ref 0–0.99)
POTASSIUM SERPL-MCNC: 4 MMOL/L — SIGNIFICANT CHANGE UP (ref 3.5–5.3)
POTASSIUM SERPL-SCNC: 4 MMOL/L — SIGNIFICANT CHANGE UP (ref 3.5–5.3)
PROT SERPL-MCNC: 7.2 G/DL — SIGNIFICANT CHANGE UP (ref 6–8.3)
PROTHROM AB SERPL-ACNC: 15.3 SEC — HIGH (ref 10–12.9)
RBC # BLD: 2.26 M/UL — LOW (ref 4.2–5.8)
RBC # FLD: 12.5 % — SIGNIFICANT CHANGE UP (ref 10.3–14.5)
SODIUM SERPL-SCNC: 139 MMOL/L — SIGNIFICANT CHANGE UP (ref 135–145)
URATE SERPL-MCNC: 1.9 MG/DL — LOW (ref 3.4–8.8)
WBC # BLD: 0.17 K/UL — CRITICAL LOW (ref 3.8–10.5)
WBC # FLD AUTO: 0.17 K/UL — CRITICAL LOW (ref 3.8–10.5)

## 2020-04-25 PROCEDURE — 99233 SBSQ HOSP IP/OBS HIGH 50: CPT

## 2020-04-25 RX ORDER — ACETAMINOPHEN 500 MG
650 TABLET ORAL ONCE
Refills: 0 | Status: COMPLETED | OUTPATIENT
Start: 2020-04-25 | End: 2020-05-07

## 2020-04-25 RX ADMIN — Medication 5 MILLILITER(S): at 08:55

## 2020-04-25 RX ADMIN — Medication 5 MILLILITER(S): at 15:26

## 2020-04-25 RX ADMIN — Medication 50 MILLIGRAM(S): at 15:08

## 2020-04-25 RX ADMIN — POSACONAZOLE 300 MILLIGRAM(S): 100 TABLET, DELAYED RELEASE ORAL at 12:38

## 2020-04-25 RX ADMIN — Medication 650 MILLIGRAM(S): at 14:19

## 2020-04-25 RX ADMIN — SODIUM CHLORIDE 75 MILLILITER(S): 9 INJECTION INTRAMUSCULAR; INTRAVENOUS; SUBCUTANEOUS at 17:31

## 2020-04-25 RX ADMIN — Medication 100 MILLIGRAM(S): at 05:10

## 2020-04-25 RX ADMIN — Medication 100 MILLIGRAM(S): at 15:00

## 2020-04-25 RX ADMIN — Medication 400 MILLIGRAM(S): at 05:10

## 2020-04-25 RX ADMIN — Medication 100 MILLIGRAM(S): at 22:40

## 2020-04-25 RX ADMIN — CYCLOSPORINE 485 MILLIGRAM(S): 100 CAPSULE ORAL at 17:25

## 2020-04-25 RX ADMIN — CYCLOSPORINE 485 MILLIGRAM(S): 100 CAPSULE ORAL at 05:08

## 2020-04-25 RX ADMIN — ATOVAQUONE 750 MILLIGRAM(S): 750 SUSPENSION ORAL at 05:10

## 2020-04-25 RX ADMIN — Medication 10 MILLIGRAM(S): at 12:38

## 2020-04-25 RX ADMIN — Medication 5 MILLILITER(S): at 19:57

## 2020-04-25 RX ADMIN — ELTROMBOPAG OLAMINE 150 MILLIGRAM(S): 50 TABLET, FILM COATED ORAL at 12:38

## 2020-04-25 RX ADMIN — Medication 5 MILLILITER(S): at 12:37

## 2020-04-25 RX ADMIN — Medication 400 MILLIGRAM(S): at 14:19

## 2020-04-25 RX ADMIN — ATOVAQUONE 750 MILLIGRAM(S): 750 SUSPENSION ORAL at 17:25

## 2020-04-25 RX ADMIN — Medication 400 MILLIGRAM(S): at 22:40

## 2020-04-25 NOTE — PROGRESS NOTE ADULT - PROBLEM SELECTOR PLAN 2
Patient is neutropenic, afebrile  Continue Levaquin and posaconazole for prophylaxis  adding Mepron, Acyclovir.   If febrile, panculture and change Levaquin to Cefepime  COVID-19 surveillance 4/22 (-) negative

## 2020-04-25 NOTE — PROGRESS NOTE ADULT - ASSESSMENT
33 y/o Male with no PMH p/w dizziness in setting of 2 weeks of fevers/chills, melena and easy bruising, found to be pancytopenic with Hb 3.6 and platelets of 2,000.  Patient has been transferred to [facility] from [facility] for further hematologic work-up. Bone Marrow biopsy performed 4/20 revealed Aplastic Anemia. Patient has pancytopenia secondary to disease process.

## 2020-04-25 NOTE — PROGRESS NOTE ADULT - SUBJECTIVE AND OBJECTIVE BOX
Diagnosis: Aplastic anemia    Protocol/Chemo Regimen: ATG/Cyclosporine/Prednisone/Eltrombopag    Day: 3    Pt endorsed: hives from ATG infusion. no other complaints.     Review of Systems: denies nausea, vomiting, diarrhea, chest pain, SOB    Pain scale: 0    Diet: regular    Allergies    No Known Allergies    Intolerances        ANTIMICROBIALS  acyclovir   Oral Tab/Cap 400 milliGRAM(s) Oral every 8 hours  atovaquone Suspension 750 milliGRAM(s) Oral every 12 hours  levoFLOXacin  Tablet 500 milliGRAM(s) Oral every 24 hours  posaconazole DR Tablet 300 milliGRAM(s) Oral daily      HEME/ONC MEDICATIONS  eltrombopag 150 milliGRAM(s) Oral daily      STANDING MEDICATIONS  acetaminophen   Tablet .. 650 milliGRAM(s) Oral daily  antithymocyte globulin equine IVPB 3880 milliGRAM(s) IV Intermittent daily  antithymocyte globulin equine Skin Test 0.1 milliLiter(s) IntraDermal once  Biotene Dry Mouth Oral Rinse 5 milliLiter(s) Swish and Spit five times a day  cycloSPORINE  (SandIMMUNE)  Solution 485 milliGRAM(s) Oral every 12 hours  diphenhydrAMINE   Injectable 50 milliGRAM(s) IV Push daily  methylPREDNISolone sodium succinate Injectable 100 milliGRAM(s) IV Push every 8 hours  phytonadione   Solution 10 milliGRAM(s) Oral daily  sodium chloride 0.9%. 1000 milliLiter(s) IV Continuous <Continuous>      PRN MEDICATIONS  acetaminophen   Tablet .. 650 milliGRAM(s) Oral every 6 hours PRN  EPINEPHrine     1 mG/mL Injectable 0.3 milliGRAM(s) IntraMuscular once PRN  methylPREDNISolone sodium succinate IVPB 500 milliGRAM(s) IV Intermittent once PRN  ondansetron Injectable 4 milliGRAM(s) IV Push every 6 hours PRN        Vital Signs Last 24 Hrs  T(C): 36.2 (25 Apr 2020 08:15), Max: 36.9 (24 Apr 2020 16:40)  T(F): 97.1 (25 Apr 2020 08:15), Max: 98.4 (24 Apr 2020 16:40)  HR: 61 (25 Apr 2020 08:15) (61 - 98)  BP: 130/65 (25 Apr 2020 08:15) (122/75 - 156/82)  BP(mean): --  RR: 18 (25 Apr 2020 08:15) (18 - 19)  SpO2: 98% (25 Apr 2020 08:15) (96% - 99%)     PHYSICAL EXAM  General: adult in NAD  CV: normal S1, S2, RRR  Lungs: clear to auscultation, no wheezes, no rales  Abdomen: soft, nontender, nondistended, normal BS  Ext: no edema. L foreaem no skin reaction to ATG test dose  Skin: no rash  Neuro: alert and oriented x 3        RECENT CULTURES:        LABS:                        6.9    0.17  )-----------( 29       ( 25 Apr 2020 05:00 )             19.4         Mean Cell Volume : 85.8 fl  Mean Cell Hemoglobin : 30.5 pg  Mean Cell Hemoglobin Concentration : 35.6 gm/dL  Auto Neutrophil # : x  Auto Lymphocyte # : x  Auto Monocyte # : x  Auto Eosinophil # : x  Auto Basophil # : x  Auto Neutrophil % : x  Auto Lymphocyte % : x  Auto Monocyte % : x  Auto Eosinophil % : x  Auto Basophil % : x      04-25    139  |  103  |  17  ----------------------------<  164<H>  4.0   |  21<L>  |  0.61    Ca    9.2      25 Apr 2020 05:00  Phos  3.2     04-25  Mg     2.2     04-25    TPro  7.2  /  Alb  4.0  /  TBili  0.6  /  DBili  x   /  AST  17  /  ALT  38  /  AlkPhos  74  04-25      Mg 2.2  Phos 3.2      PT/INR - ( 25 Apr 2020 05:00 )   PT: 15.3 sec;   INR: 1.32 ratio         PTT - ( 25 Apr 2020 05:00 )  PTT:25.1 sec      Uric Acid 1.9        RADIOLOGY & ADDITIONAL STUDIES: Diagnosis: Aplastic anemia    Protocol/Chemo Regimen: ATG/Cyclosporine/Prednisone/Eltrombopag    Day: 3    Pt endorsed: No complaints    Review of Systems: denies nausea, vomiting, diarrhea, chest pain, SOB    Pain scale: 0    Diet: regular    Allergies    No Known Allergies    Intolerances        ANTIMICROBIALS  acyclovir   Oral Tab/Cap 400 milliGRAM(s) Oral every 8 hours  atovaquone Suspension 750 milliGRAM(s) Oral every 12 hours  levoFLOXacin  Tablet 500 milliGRAM(s) Oral every 24 hours  posaconazole DR Tablet 300 milliGRAM(s) Oral daily      HEME/ONC MEDICATIONS  eltrombopag 150 milliGRAM(s) Oral daily      STANDING MEDICATIONS  acetaminophen   Tablet .. 650 milliGRAM(s) Oral daily  antithymocyte globulin equine IVPB 3880 milliGRAM(s) IV Intermittent daily  antithymocyte globulin equine Skin Test 0.1 milliLiter(s) IntraDermal once  Biotene Dry Mouth Oral Rinse 5 milliLiter(s) Swish and Spit five times a day  cycloSPORINE  (SandIMMUNE)  Solution 485 milliGRAM(s) Oral every 12 hours  diphenhydrAMINE   Injectable 50 milliGRAM(s) IV Push daily  methylPREDNISolone sodium succinate Injectable 100 milliGRAM(s) IV Push every 8 hours  phytonadione   Solution 10 milliGRAM(s) Oral daily  sodium chloride 0.9%. 1000 milliLiter(s) IV Continuous <Continuous>      PRN MEDICATIONS  acetaminophen   Tablet .. 650 milliGRAM(s) Oral every 6 hours PRN  EPINEPHrine     1 mG/mL Injectable 0.3 milliGRAM(s) IntraMuscular once PRN  methylPREDNISolone sodium succinate IVPB 500 milliGRAM(s) IV Intermittent once PRN  ondansetron Injectable 4 milliGRAM(s) IV Push every 6 hours PRN        Vital Signs Last 24 Hrs  T(C): 36.2 (25 Apr 2020 08:15), Max: 36.9 (24 Apr 2020 16:40)  T(F): 97.1 (25 Apr 2020 08:15), Max: 98.4 (24 Apr 2020 16:40)  HR: 61 (25 Apr 2020 08:15) (61 - 98)  BP: 130/65 (25 Apr 2020 08:15) (122/75 - 156/82)  BP(mean): --  RR: 18 (25 Apr 2020 08:15) (18 - 19)  SpO2: 98% (25 Apr 2020 08:15) (96% - 99%)     PHYSICAL EXAM  General: adult in NAD  CV: normal S1, S2, RRR  Lungs: clear to auscultation, no wheezes, no rales  Abdomen: soft, nontender, nondistended, normal BS  Ext: no edema. L foreaem no skin reaction to ATG test dose  Skin: no rash  Neuro: alert and oriented x 3        RECENT CULTURES:        LABS:                        6.9    0.17  )-----------( 29       ( 25 Apr 2020 05:00 )             19.4         Mean Cell Volume : 85.8 fl  Mean Cell Hemoglobin : 30.5 pg  Mean Cell Hemoglobin Concentration : 35.6 gm/dL  Auto Neutrophil # : x  Auto Lymphocyte # : x  Auto Monocyte # : x  Auto Eosinophil # : x  Auto Basophil # : x  Auto Neutrophil % : x  Auto Lymphocyte % : x  Auto Monocyte % : x  Auto Eosinophil % : x  Auto Basophil % : x      04-25    139  |  103  |  17  ----------------------------<  164<H>  4.0   |  21<L>  |  0.61    Ca    9.2      25 Apr 2020 05:00  Phos  3.2     04-25  Mg     2.2     04-25    TPro  7.2  /  Alb  4.0  /  TBili  0.6  /  DBili  x   /  AST  17  /  ALT  38  /  AlkPhos  74  04-25      Mg 2.2  Phos 3.2      PT/INR - ( 25 Apr 2020 05:00 )   PT: 15.3 sec;   INR: 1.32 ratio         PTT - ( 25 Apr 2020 05:00 )  PTT:25.1 sec      Uric Acid 1.9        RADIOLOGY & ADDITIONAL STUDIES: Diagnosis: Aplastic anemia    Protocol/Chemo Regimen: ATG/Cyclosporine/Eltrombopag (solumedrol until day 4 then prednisone day 5-10).     Day: 3    Pt endorsed: No complaints    Review of Systems: denies nausea, vomiting, diarrhea, chest pain, SOB    Pain scale: 0    Diet: regular    Allergies    No Known Allergies    Intolerances        ANTIMICROBIALS  acyclovir   Oral Tab/Cap 400 milliGRAM(s) Oral every 8 hours  atovaquone Suspension 750 milliGRAM(s) Oral every 12 hours  levoFLOXacin  Tablet 500 milliGRAM(s) Oral every 24 hours  posaconazole DR Tablet 300 milliGRAM(s) Oral daily      HEME/ONC MEDICATIONS  eltrombopag 150 milliGRAM(s) Oral daily      STANDING MEDICATIONS  acetaminophen   Tablet .. 650 milliGRAM(s) Oral daily  antithymocyte globulin equine IVPB 3880 milliGRAM(s) IV Intermittent daily  antithymocyte globulin equine Skin Test 0.1 milliLiter(s) IntraDermal once  Biotene Dry Mouth Oral Rinse 5 milliLiter(s) Swish and Spit five times a day  cycloSPORINE  (SandIMMUNE)  Solution 485 milliGRAM(s) Oral every 12 hours  diphenhydrAMINE   Injectable 50 milliGRAM(s) IV Push daily  methylPREDNISolone sodium succinate Injectable 100 milliGRAM(s) IV Push every 8 hours  phytonadione   Solution 10 milliGRAM(s) Oral daily  sodium chloride 0.9%. 1000 milliLiter(s) IV Continuous <Continuous>      PRN MEDICATIONS  acetaminophen   Tablet .. 650 milliGRAM(s) Oral every 6 hours PRN  EPINEPHrine     1 mG/mL Injectable 0.3 milliGRAM(s) IntraMuscular once PRN  methylPREDNISolone sodium succinate IVPB 500 milliGRAM(s) IV Intermittent once PRN  ondansetron Injectable 4 milliGRAM(s) IV Push every 6 hours PRN        Vital Signs Last 24 Hrs  T(C): 36.2 (25 Apr 2020 08:15), Max: 36.9 (24 Apr 2020 16:40)  T(F): 97.1 (25 Apr 2020 08:15), Max: 98.4 (24 Apr 2020 16:40)  HR: 61 (25 Apr 2020 08:15) (61 - 98)  BP: 130/65 (25 Apr 2020 08:15) (122/75 - 156/82)  BP(mean): --  RR: 18 (25 Apr 2020 08:15) (18 - 19)  SpO2: 98% (25 Apr 2020 08:15) (96% - 99%)     PHYSICAL EXAM  General: adult in NAD  CV: normal S1, S2, RRR  Lungs: clear to auscultation, no wheezes, no rales  Abdomen: soft, nontender, nondistended, normal BS  Ext: no edema. L foreaem no skin reaction to ATG test dose  Skin: no rash  Neuro: alert and oriented x 3        RECENT CULTURES:        LABS:                        6.9    0.17  )-----------( 29       ( 25 Apr 2020 05:00 )             19.4         Mean Cell Volume : 85.8 fl  Mean Cell Hemoglobin : 30.5 pg  Mean Cell Hemoglobin Concentration : 35.6 gm/dL  Auto Neutrophil # : x  Auto Lymphocyte # : x  Auto Monocyte # : x  Auto Eosinophil # : x  Auto Basophil # : x  Auto Neutrophil % : x  Auto Lymphocyte % : x  Auto Monocyte % : x  Auto Eosinophil % : x  Auto Basophil % : x      04-25    139  |  103  |  17  ----------------------------<  164<H>  4.0   |  21<L>  |  0.61    Ca    9.2      25 Apr 2020 05:00  Phos  3.2     04-25  Mg     2.2     04-25    TPro  7.2  /  Alb  4.0  /  TBili  0.6  /  DBili  x   /  AST  17  /  ALT  38  /  AlkPhos  74  04-25      Mg 2.2  Phos 3.2      PT/INR - ( 25 Apr 2020 05:00 )   PT: 15.3 sec;   INR: 1.32 ratio         PTT - ( 25 Apr 2020 05:00 )  PTT:25.1 sec      Uric Acid 1.9        RADIOLOGY & ADDITIONAL STUDIES:

## 2020-04-25 NOTE — PROGRESS NOTE ADULT - ATTENDING COMMENTS
34M with no PMH presented to OSH with dizziness x 2 weeks, easy bruising x 2-4 weeks. At home had a syncopal event and called 911, found to have hemoglobin 3s, Platelets 2K, no evidence of DIC. Noted mild petechiae and has had gum bleeding. These have improved. Noted dark stools for 2 weeks. Peripheral smear with immature forms concerning for acute leukemia vs. aplastic anemia   -peripheral flow without blast population, FISH pending   -received ATRA 50 mg BID until PML-CHANTELLE negative   -Bone marrow evaluation(4/20) in conjunction with lab studies document severe aplastic anemia(no inciting factors identified).  I had lengthy discussion with patient on 4/22/20 regarding the diagnosis, prognosis and treatment recommendations at present time: hATG, Cyclosporine, Eltrombopag. Potential benefits as well as side effects of this combination regimen include(but not limited to) fever, chills, headache, dizziness, nausea, vomiting, diarrhea, musculoskeletal pains, liver dysfunction, and rare risk of serum sickness(ATG); kidney dysfunction, electrolyte abnormalities, hypertension, tremors, and with Eltrombopag- increased risk of blood clots if platelet count rapidly rises, liver dysfunction, new or worsening cataracts. Patient asked all of his questions and after all questions addressed, he made the decision to begin recommended treatment regimen. I explained that Prednisone use started with ATG is to prevent serum sickness.   - ATG test dose negative. Begin ATG, CSA, Eltrombopag on 4/23  -patient developed hives and short episode of wheezing with ATG infusion, which resolved- will continue Solumedrol pre-med with ATG(hold Prednisone), then resume Prednisone on day 5 to complete recommended course for prevention of serum sickness.  - IVF, mouth care; discontinued Allopurinol  -receiving transfusions if Hgb< 7, Plt <50k   -optho consult given hazy vision, suspect retinal bleed given low platelets   -monitor for s/s of bleeding, gum bleeding intermittent   -Paranasal sinusitis on CT head(4/17)- cont Levaquin, begin Posaconazole; initiate Mepron and Acyclovir with immunosuppressive therapy. 34M with no PMH presented to OSH with dizziness x 2 weeks, easy bruising x 2-4 weeks. At home had a syncopal event and called 911, found to have hemoglobin 3s, Platelets 2K, no evidence of DIC. Noted mild petechiae and has had gum bleeding. These have improved. Noted dark stools for 2 weeks. Peripheral smear with immature forms concerning for acute leukemia vs. aplastic anemia   -peripheral flow without blast population, FISH pending   -received ATRA 50 mg BID until PML-CHANTELLE negative   -Bone marrow evaluation(4/20) in conjunction with lab studies document severe aplastic anemia(no inciting factors identified).  I had lengthy discussion with patient on 4/22/20 regarding the diagnosis, prognosis and treatment recommendations at present time: hATG, Cyclosporine, Eltrombopag. Potential benefits as well as side effects of this combination regimen include(but not limited to) fever, chills, headache, dizziness, nausea, vomiting, diarrhea, musculoskeletal pains, liver dysfunction, and rare risk of serum sickness(ATG); kidney dysfunction, electrolyte abnormalities, hypertension, tremors, and with Eltrombopag- increased risk of blood clots if platelet count rapidly rises, liver dysfunction, new or worsening cataracts. Patient asked all of his questions and after all questions addressed, he made the decision to begin recommended treatment regimen. I explained that Prednisone use started with ATG is to prevent serum sickness.   - ATG test dose negative. Begin ATG, CSA, Eltrombopag on 4/23  -patient developed hives and short episode of wheezing with ATG infusion, which resolved- will continue Solumedrol pre-med with ATG(hold Prednisone), then resume Prednisone on day 5 to complete recommended course for prevention of serum sickness.  - IVF, mouth care; discontinued Allopurinol  -receiving transfusions if Hgb< 7, Plt <50k   - vit K for elevated INR  -optho consult given hazy vision, suspect retinal bleed given low platelets   -monitor for s/s of bleeding, gum bleeding intermittent   -Paranasal sinusitis on CT head(4/17)- cont Levaquin, begin Posaconazole; initiate Mepron and Acyclovir with immunosuppressive therapy.

## 2020-04-25 NOTE — PROGRESS NOTE ADULT - PROBLEM SELECTOR PLAN 3
Intraretinal hemorrhage from thrombocytopenia + anemia  Transfuse to keep PLTs above 50k for now  Opthalmology [id number]:  Patient should follow up his/her ophthalmologist or in the [facility] within one week of discharge.    [address]  [place], [place] [id number] [phone]

## 2020-04-26 LAB
ALBUMIN SERPL ELPH-MCNC: 3.7 G/DL — SIGNIFICANT CHANGE UP (ref 3.3–5)
ALBUMIN SERPL ELPH-MCNC: 3.7 G/DL — SIGNIFICANT CHANGE UP (ref 3.3–5)
ALP SERPL-CCNC: 73 U/L — SIGNIFICANT CHANGE UP (ref 40–120)
ALP SERPL-CCNC: 74 U/L — SIGNIFICANT CHANGE UP (ref 40–120)
ALT FLD-CCNC: 110 U/L — HIGH (ref 10–45)
ALT FLD-CCNC: 121 U/L — HIGH (ref 10–45)
ANION GAP SERPL CALC-SCNC: 13 MMOL/L — SIGNIFICANT CHANGE UP (ref 5–17)
ANION GAP SERPL CALC-SCNC: 16 MMOL/L — SIGNIFICANT CHANGE UP (ref 5–17)
APTT BLD: 22 SEC — LOW (ref 27.5–36.3)
APTT BLD: 22.9 SEC — LOW (ref 27.5–36.3)
AST SERPL-CCNC: 25 U/L — SIGNIFICANT CHANGE UP (ref 10–40)
AST SERPL-CCNC: 46 U/L — HIGH (ref 10–40)
BILIRUB SERPL-MCNC: 1.4 MG/DL — HIGH (ref 0.2–1.2)
BILIRUB SERPL-MCNC: 1.4 MG/DL — HIGH (ref 0.2–1.2)
BUN SERPL-MCNC: 18 MG/DL — SIGNIFICANT CHANGE UP (ref 7–23)
BUN SERPL-MCNC: 18 MG/DL — SIGNIFICANT CHANGE UP (ref 7–23)
CALCIUM SERPL-MCNC: 8.8 MG/DL — SIGNIFICANT CHANGE UP (ref 8.4–10.5)
CALCIUM SERPL-MCNC: 8.9 MG/DL — SIGNIFICANT CHANGE UP (ref 8.4–10.5)
CHLORIDE SERPL-SCNC: 102 MMOL/L — SIGNIFICANT CHANGE UP (ref 96–108)
CHLORIDE SERPL-SCNC: 103 MMOL/L — SIGNIFICANT CHANGE UP (ref 96–108)
CO2 SERPL-SCNC: 18 MMOL/L — LOW (ref 22–31)
CO2 SERPL-SCNC: 21 MMOL/L — LOW (ref 22–31)
CREAT SERPL-MCNC: 0.59 MG/DL — SIGNIFICANT CHANGE UP (ref 0.5–1.3)
CREAT SERPL-MCNC: 0.65 MG/DL — SIGNIFICANT CHANGE UP (ref 0.5–1.3)
D DIMER BLD IA.RAPID-MCNC: 362 NG/ML DDU — HIGH
D DIMER BLD IA.RAPID-MCNC: 377 NG/ML DDU — HIGH
FIBRINOGEN PPP-MCNC: 416 MG/DL — SIGNIFICANT CHANGE UP (ref 320–500)
FIBRINOGEN PPP-MCNC: 432 MG/DL — SIGNIFICANT CHANGE UP (ref 320–500)
GLUCOSE SERPL-MCNC: 162 MG/DL — HIGH (ref 70–99)
GLUCOSE SERPL-MCNC: 189 MG/DL — HIGH (ref 70–99)
HCT VFR BLD CALC: 22.4 % — LOW (ref 39–50)
HCT VFR BLD CALC: 23.4 % — LOW (ref 39–50)
HGB BLD-MCNC: 8 G/DL — LOW (ref 13–17)
HGB BLD-MCNC: 8.4 G/DL — LOW (ref 13–17)
INR BLD: 1.17 RATIO — HIGH (ref 0.88–1.16)
INR BLD: 1.25 RATIO — HIGH (ref 0.88–1.16)
LDH SERPL L TO P-CCNC: 147 U/L — SIGNIFICANT CHANGE UP (ref 50–242)
LDH SERPL L TO P-CCNC: 185 U/L — SIGNIFICANT CHANGE UP (ref 50–242)
MAGNESIUM SERPL-MCNC: 2.1 MG/DL — SIGNIFICANT CHANGE UP (ref 1.6–2.6)
MAGNESIUM SERPL-MCNC: 2.2 MG/DL — SIGNIFICANT CHANGE UP (ref 1.6–2.6)
MCHC RBC-ENTMCNC: 30.1 PG — SIGNIFICANT CHANGE UP (ref 27–34)
MCHC RBC-ENTMCNC: 30.4 PG — SIGNIFICANT CHANGE UP (ref 27–34)
MCHC RBC-ENTMCNC: 35.7 GM/DL — SIGNIFICANT CHANGE UP (ref 32–36)
MCHC RBC-ENTMCNC: 35.9 GM/DL — SIGNIFICANT CHANGE UP (ref 32–36)
MCV RBC AUTO: 83.9 FL — SIGNIFICANT CHANGE UP (ref 80–100)
MCV RBC AUTO: 85.2 FL — SIGNIFICANT CHANGE UP (ref 80–100)
NRBC # BLD: 0 /100 WBCS — SIGNIFICANT CHANGE UP (ref 0–0)
NRBC # BLD: 0 /100 WBCS — SIGNIFICANT CHANGE UP (ref 0–0)
PHOSPHATE SERPL-MCNC: 2.7 MG/DL — SIGNIFICANT CHANGE UP (ref 2.5–4.5)
PHOSPHATE SERPL-MCNC: 2.8 MG/DL — SIGNIFICANT CHANGE UP (ref 2.5–4.5)
PLATELET # BLD AUTO: 24 K/UL — LOW (ref 150–400)
PLATELET # BLD AUTO: 25 K/UL — LOW (ref 150–400)
POTASSIUM SERPL-MCNC: 3.8 MMOL/L — SIGNIFICANT CHANGE UP (ref 3.5–5.3)
POTASSIUM SERPL-MCNC: 3.8 MMOL/L — SIGNIFICANT CHANGE UP (ref 3.5–5.3)
POTASSIUM SERPL-SCNC: 3.8 MMOL/L — SIGNIFICANT CHANGE UP (ref 3.5–5.3)
POTASSIUM SERPL-SCNC: 3.8 MMOL/L — SIGNIFICANT CHANGE UP (ref 3.5–5.3)
PROT SERPL-MCNC: 6.7 G/DL — SIGNIFICANT CHANGE UP (ref 6–8.3)
PROT SERPL-MCNC: 6.9 G/DL — SIGNIFICANT CHANGE UP (ref 6–8.3)
PROTHROM AB SERPL-ACNC: 13.4 SEC — HIGH (ref 10–12.9)
PROTHROM AB SERPL-ACNC: 14.5 SEC — HIGH (ref 10–12.9)
RBC # BLD: 2.63 M/UL — LOW (ref 4.2–5.8)
RBC # BLD: 2.79 M/UL — LOW (ref 4.2–5.8)
RBC # FLD: 12.5 % — SIGNIFICANT CHANGE UP (ref 10.3–14.5)
RBC # FLD: 12.6 % — SIGNIFICANT CHANGE UP (ref 10.3–14.5)
SODIUM SERPL-SCNC: 136 MMOL/L — SIGNIFICANT CHANGE UP (ref 135–145)
SODIUM SERPL-SCNC: 137 MMOL/L — SIGNIFICANT CHANGE UP (ref 135–145)
URATE SERPL-MCNC: 2.2 MG/DL — LOW (ref 3.4–8.8)
URATE SERPL-MCNC: 2.6 MG/DL — LOW (ref 3.4–8.8)
WBC # BLD: 0.17 K/UL — CRITICAL LOW (ref 3.8–10.5)
WBC # BLD: 0.28 K/UL — CRITICAL LOW (ref 3.8–10.5)
WBC # FLD AUTO: 0.17 K/UL — CRITICAL LOW (ref 3.8–10.5)
WBC # FLD AUTO: 0.28 K/UL — CRITICAL LOW (ref 3.8–10.5)

## 2020-04-26 PROCEDURE — 99233 SBSQ HOSP IP/OBS HIGH 50: CPT

## 2020-04-26 RX ADMIN — Medication 650 MILLIGRAM(S): at 14:04

## 2020-04-26 RX ADMIN — Medication 100 MILLIGRAM(S): at 17:21

## 2020-04-26 RX ADMIN — Medication 10 MILLIGRAM(S): at 12:07

## 2020-04-26 RX ADMIN — Medication 5 MILLILITER(S): at 12:07

## 2020-04-26 RX ADMIN — Medication 400 MILLIGRAM(S): at 21:40

## 2020-04-26 RX ADMIN — Medication 400 MILLIGRAM(S): at 14:04

## 2020-04-26 RX ADMIN — ATOVAQUONE 750 MILLIGRAM(S): 750 SUSPENSION ORAL at 05:23

## 2020-04-26 RX ADMIN — Medication 5 MILLILITER(S): at 19:49

## 2020-04-26 RX ADMIN — Medication 30 MILLILITER(S): at 00:35

## 2020-04-26 RX ADMIN — CYCLOSPORINE 485 MILLIGRAM(S): 100 CAPSULE ORAL at 05:23

## 2020-04-26 RX ADMIN — POSACONAZOLE 300 MILLIGRAM(S): 100 TABLET, DELAYED RELEASE ORAL at 12:07

## 2020-04-26 RX ADMIN — Medication 50 MILLIGRAM(S): at 14:09

## 2020-04-26 RX ADMIN — Medication 100 MILLIGRAM(S): at 10:19

## 2020-04-26 RX ADMIN — Medication 400 MILLIGRAM(S): at 05:23

## 2020-04-26 RX ADMIN — Medication 5 MILLILITER(S): at 16:25

## 2020-04-26 RX ADMIN — CYCLOSPORINE 485 MILLIGRAM(S): 100 CAPSULE ORAL at 17:22

## 2020-04-26 RX ADMIN — Medication 30 MILLILITER(S): at 17:27

## 2020-04-26 RX ADMIN — ATOVAQUONE 750 MILLIGRAM(S): 750 SUSPENSION ORAL at 17:22

## 2020-04-26 RX ADMIN — Medication 30 MILLILITER(S): at 10:38

## 2020-04-26 RX ADMIN — ELTROMBOPAG OLAMINE 150 MILLIGRAM(S): 50 TABLET, FILM COATED ORAL at 12:07

## 2020-04-26 NOTE — PROGRESS NOTE ADULT - ASSESSMENT
33 y/o Male with no PMH p/w dizziness in setting of 2 weeks of fevers/chills, melena and easy bruising, found to be pancytopenic with Hb 3.6 and platelets of 2,000.  Patient has been transferred to [facility] from [facility] for further hematologic work-up. Bone Marrow biopsy performed 4/20 revealed Aplastic Anemia. Patient has pancytopenia secondary to disease process.

## 2020-04-26 NOTE — PROGRESS NOTE ADULT - PROBLEM SELECTOR PLAN 1
4/20 BM Bx consistent with aplastic anemia  s/p ATG Intradermal test dose 4/22 - no reaction  start h-ATG, Cyclosporine, Prednisone, Eltrombopag 4/23  Horse ATG 40mg/kg/day = 3880 mg daily x 4 days  Cyclosporine 10mg/kg/day divided BID = 485mg BID  Eltrombopag 150mg daily  Strict I&Os, daily weights, mouth care  HLA typing sent out 4/20  CMV (-)  EBV,   Parvovirus (-)  HIV/Hepatitis panel negative  EMILIA negative  s/p reaction from ATG on 4/23 (hives)  4/24 will leave solumedrol 100mg IV q8 for the 4 days with ATG. d/c'd  prednisone. Start prednisone on day 5-10  (4/27-5/2) and then order taper.  Anemia-replace PRBC  Thrombocytopenia-replace plt 4/20 BM Bx consistent with aplastic anemia  s/p ATG Intradermal test dose 4/22 - no reaction  start h-ATG, Cyclosporine, Prednisone, Eltrombopag 4/23  Horse ATG 40mg/kg/day = 3880 mg daily x 4 days  Cyclosporine 10mg/kg/day divided BID = 485mg BID  Eltrombopag 150mg daily  Strict I&Os, daily weights, mouth care  HLA typing sent out 4/20  CMV (-)  EBV,   Parvovirus (-)  HIV/Hepatitis panel negative  EMILIA negative  s/p reaction from ATG on 4/23 (hives)  4/24 will leave solumedrol 100mg IV q8 for the 4 days with ATG. d/c'd  prednisone. Start prednisone on day 5-10  (4/27-5/2) and then order taper.  Thrombocytopenia-replace plt

## 2020-04-26 NOTE — PROGRESS NOTE ADULT - SUBJECTIVE AND OBJECTIVE BOX
Diagnosis: Aplastic anemia    Protocol/Chemo Regimen: ATG/Cyclosporine/Eltrombopag (solumedrol until day 4 then prednisone day 5-10).     Day: 4    Pt endorsed: No complaints    Review of Systems: denies nausea, vomiting, diarrhea, chest pain, SOB    Pain scale: 0    Diet: regular    Allergies    No Known Allergies    Intolerances        ANTIMICROBIALS  acyclovir   Oral Tab/Cap 400 milliGRAM(s) Oral every 8 hours  atovaquone Suspension 750 milliGRAM(s) Oral every 12 hours  levoFLOXacin  Tablet 500 milliGRAM(s) Oral every 24 hours  posaconazole DR Tablet 300 milliGRAM(s) Oral daily      HEME/ONC MEDICATIONS  eltrombopag 150 milliGRAM(s) Oral daily      STANDING MEDICATIONS  acetaminophen   Tablet .. 650 milliGRAM(s) Oral daily  antithymocyte globulin equine IVPB 3880 milliGRAM(s) IV Intermittent daily  antithymocyte globulin equine Skin Test 0.1 milliLiter(s) IntraDermal once  Biotene Dry Mouth Oral Rinse 5 milliLiter(s) Swish and Spit five times a day  cycloSPORINE  (SandIMMUNE)  Solution 485 milliGRAM(s) Oral every 12 hours  diphenhydrAMINE   Injectable 50 milliGRAM(s) IV Push daily  methylPREDNISolone sodium succinate Injectable 100 milliGRAM(s) IV Push every 8 hours  phytonadione   Solution 10 milliGRAM(s) Oral daily  sodium chloride 0.9%. 1000 milliLiter(s) IV Continuous <Continuous>      PRN MEDICATIONS  acetaminophen   Tablet .. 650 milliGRAM(s) Oral every 6 hours PRN  acetaminophen   Tablet .. 650 milliGRAM(s) Oral once PRN  EPINEPHrine     1 mG/mL Injectable 0.3 milliGRAM(s) IntraMuscular once PRN  methylPREDNISolone sodium succinate IVPB 500 milliGRAM(s) IV Intermittent once PRN  ondansetron Injectable 4 milliGRAM(s) IV Push every 6 hours PRN        Vital Signs Last 24 Hrs  T(C): 36.2 (26 Apr 2020 06:41), Max: 36.6 (25 Apr 2020 15:15)  T(F): 97.1 (26 Apr 2020 06:41), Max: 97.9 (25 Apr 2020 15:15)  HR: 62 (26 Apr 2020 06:41) (54 - 96)  BP: 122/79 (26 Apr 2020 06:41) (121/84 - 162/93)  BP(mean): --  RR: 18 (26 Apr 2020 06:41) (17 - 18)  SpO2: 99% (26 Apr 2020 06:41) (94% - 99%)    PHYSICAL EXAM  General: NAD  HEENT: clear oropharynx, anicteric sclera, pink conjunctiva  Neck: supple  CV: (+) S1/S2 RRR  Lungs: positive air movement b/l ant lungs, clear to auscultation, no wheezes, no rales  Abdomen: soft, non-tender, non-distended  Ext: no clubbing cyanosis or edema  Skin: no rashes and no petechiae  Neuro: alert and oriented X 3, no focal deficits  Central Line:     RECENT CULTURES:        LABS:                        6.9    0.17  )-----------( 29       ( 25 Apr 2020 05:00 )             19.4         Mean Cell Volume : 85.8 fl  Mean Cell Hemoglobin : 30.5 pg  Mean Cell Hemoglobin Concentration : 35.6 gm/dL  Auto Neutrophil # : x  Auto Lymphocyte # : x  Auto Monocyte # : x  Auto Eosinophil # : x  Auto Basophil # : x  Auto Neutrophil % : x  Auto Lymphocyte % : x  Auto Monocyte % : x  Auto Eosinophil % : x  Auto Basophil % : x      04-26    137  |  103  |  18  ----------------------------<  162<H>  3.8   |  18<L>  |  0.59    Ca    8.9      26 Apr 2020 07:22  Phos  2.8     04-26  Mg     2.1     04-26    TPro  6.7  /  Alb  3.7  /  TBili  1.4<H>  /  DBili  x   /  AST  46<H>  /  ALT  121<H>  /  AlkPhos  73  04-26      Mg 2.1  Phos 2.8      PT/INR - ( 25 Apr 2020 05:00 )   PT: 15.3 sec;   INR: 1.32 ratio         PTT - ( 25 Apr 2020 05:00 )  PTT:25.1 sec      Uric Acid 2.2        RADIOLOGY & ADDITIONAL STUDIES:

## 2020-04-26 NOTE — PROGRESS NOTE ADULT - PROBLEM SELECTOR PLAN 3
Intraretinal hemorrhage from thrombocytopenia + anemia  Transfuse to keep PLTs above 50k for now  Opthalmology [id number]:  Patient should follow up his/her ophthalmologist or in the [facility] within one week of discharge.    [address] [facility]  [place], [place] [id number] [phone]

## 2020-04-26 NOTE — CHART NOTE - NSCHARTNOTEFT_GEN_A_CORE
Called by RN to evaluate Pt. forabdominal pain    .  Pt seen and evaluated at bedside.  Denies headache, dizziness, visual disturbance, chest pain, cough, SOB, palpitations, abdominal pain, N/V/D , dysuria.   Offers no complaints at present time.      Vital Signs Last 24 Hrs  T(C): 36.2 (26 Apr 2020 00:30), Max: 36.6 (25 Apr 2020 02:55)  T(F): 97.1 (26 Apr 2020 00:30), Max: 97.9 (25 Apr 2020 02:55)  HR: 62 (26 Apr 2020 00:30) (54 - 96)  BP: 151/91 (26 Apr 2020 00:30) (126/69 - 162/93)  BP(mean): --  RR: 18 (26 Apr 2020 00:30) (17 - 19)  SpO2: 98% (26 Apr 2020 00:30) (94% - 99%)    Labs:                        6.9    0.17  )-----------( 29       ( 25 Apr 2020 05:00 )             19.4       04-25    139  |  103  |  17  ----------------------------<  164<H>  4.0   |  21<L>  |  0.61    Ca    9.2      25 Apr 2020 05:00  Phos  3.2     04-25  Mg     2.2     04-25    TPro  7.2  /  Alb  4.0  /  TBili  0.6  /  DBili  x   /  AST  17  /  ALT  38  /  AlkPhos  74  04-25      Antimicrobials:  MEDICATIONS  (STANDING):  acetaminophen   Tablet .. 650 milliGRAM(s) Oral daily  acyclovir   Oral Tab/Cap 400 milliGRAM(s) Oral every 8 hours  antithymocyte globulin equine IVPB 3880 milliGRAM(s) IV Intermittent daily  antithymocyte globulin equine Skin Test 0.1 milliLiter(s) IntraDermal once  atovaquone Suspension 750 milliGRAM(s) Oral every 12 hours  Biotene Dry Mouth Oral Rinse 5 milliLiter(s) Swish and Spit five times a day  cycloSPORINE  (SandIMMUNE)  Solution 485 milliGRAM(s) Oral every 12 hours  diphenhydrAMINE   Injectable 50 milliGRAM(s) IV Push daily  eltrombopag 150 milliGRAM(s) Oral daily  levoFLOXacin  Tablet 500 milliGRAM(s) Oral every 24 hours  methylPREDNISolone sodium succinate Injectable 100 milliGRAM(s) IV Push every 8 hours  phytonadione   Solution 10 milliGRAM(s) Oral daily  posaconazole DR Tablet 300 milliGRAM(s) Oral daily  sodium chloride 0.9%. 1000 milliLiter(s) (75 mL/Hr) IV Continuous <Continuous>        PE:    General: Alert & Oriented x 3, non toxic, in no acute distress  Neuro: non focal  Cardiac: S1, S2, tachycardic  Pulm: Lungs CTA  GI: Abd soft, NT/ND, + bowel sounds x 4 quadrants  Ext: no edema, + pedal pulses BL  Skin: intact      AP:     1.  Neutropenic fever       - continue with current antimicrobials       - continue antipyretic/cooling measures      - continue IV hydration      - BC x 2      - UA/CS      - f/u panculture results      - cxr      - continue to closely monitor      - f/u with primary team in AM    Elli Martinez, ANP x Called by RN to evaluate Pt. for abdominal pain.   Pt seen and evaluated at bedside.  A&O x 3, non-toxic, in no acute distress. Pt endorses having lower abdominal pain, aching 4/10 on pain scale since yesterday, when Day 2 of ATG was started.  Pt. reports +bowel movement earlier this evening, and + gas.  Endorses 4 episodes of loose stool. Denies headache, dizziness, visual disturbance, chest pain, cough, SOB, palpitations, nausea, vomitting , dysuria.  Day 3 ATG currently being adminstered.    Vital Signs Last 24 Hrs  T(C): 36.2 (26 Apr 2020 00:30), Max: 36.6 (25 Apr 2020 02:55)  T(F): 97.1 (26 Apr 2020 00:30), Max: 97.9 (25 Apr 2020 02:55)  HR: 62 (26 Apr 2020 00:30) (54 - 96)  BP: 151/91 (26 Apr 2020 00:30) (126/69 - 162/93)  BP(mean): --  RR: 18 (26 Apr 2020 00:30) (17 - 19)  SpO2: 98% (26 Apr 2020 00:30) (94% - 99%)    MEDICATIONS  (STANDING):  acetaminophen   Tablet .. 650 milliGRAM(s) Oral daily  acyclovir   Oral Tab/Cap 400 milliGRAM(s) Oral every 8 hours  antithymocyte globulin equine IVPB 3880 milliGRAM(s) IV Intermittent daily  antithymocyte globulin equine Skin Test 0.1 milliLiter(s) IntraDermal once  atovaquone Suspension 750 milliGRAM(s) Oral every 12 hours  Biotene Dry Mouth Oral Rinse 5 milliLiter(s) Swish and Spit five times a day  cycloSPORINE  (SandIMMUNE)  Solution 485 milliGRAM(s) Oral every 12 hours  diphenhydrAMINE   Injectable 50 milliGRAM(s) IV Push daily  eltrombopag 150 milliGRAM(s) Oral daily  levoFLOXacin  Tablet 500 milliGRAM(s) Oral every 24 hours  methylPREDNISolone sodium succinate Injectable 100 milliGRAM(s) IV Push every 8 hours  phytonadione   Solution 10 milliGRAM(s) Oral daily  posaconazole DR Tablet 300 milliGRAM(s) Oral daily  sodium chloride 0.9%. 1000 milliLiter(s) (75 mL/Hr) IV Continuous <Continuous>        PE:    General: Alert & Oriented x 3, non toxic, in no acute distress  Neuro: non focal  Cardiac: S1, S2, RRR  Pulm: Lungs CTA  GI: Abd soft, NT/ND, + bowel sounds x 4 quadrants  Ext: no edema, + pedal pulses BL  Skin: intact      AP:     1.  abdominal pain       - continue with current antimicrobials       - continue antipyretic/cooling measures      - continue IV hydration      - BC x 2      - UA/CS      - f/u panculture results      - cxr      - continue to closely monitor      - f/u with primary team in AM    Elli Martinez, ANP x Called by RN to evaluate Pt. for abdominal pain.   Pt seen and evaluated at bedside.  A&O x 3, non-toxic, in no acute distress. Pt endorses having lower abdominal pain, aching 4/10 on pain scale since yesterday, when Day 2 of ATG was started.  Pt. reports +bowel movement earlier this evening, and + gas.  Endorses 4 episodes of loose stool. Denies headache, dizziness, visual disturbance, chest pain, cough, SOB, palpitations, nausea, vomiting , dysuria.  Day 3 ATG currently being administered.    Vital Signs Last 24 Hrs  T(C): 36.2 (26 Apr 2020 00:30), Max: 36.6 (25 Apr 2020 02:55)  T(F): 97.1 (26 Apr 2020 00:30), Max: 97.9 (25 Apr 2020 02:55)  HR: 62 (26 Apr 2020 00:30) (54 - 96)  BP: 151/91 (26 Apr 2020 00:30) (126/69 - 162/93)  BP(mean): --  RR: 18 (26 Apr 2020 00:30) (17 - 19)  SpO2: 98% (26 Apr 2020 00:30) (94% - 99%)    MEDICATIONS  (STANDING):  acetaminophen   Tablet .. 650 milliGRAM(s) Oral daily  acyclovir   Oral Tab/Cap 400 milliGRAM(s) Oral every 8 hours  antithymocyte globulin equine IVPB 3880 milliGRAM(s) IV Intermittent daily  antithymocyte globulin equine Skin Test 0.1 milliLiter(s) IntraDermal once  atovaquone Suspension 750 milliGRAM(s) Oral every 12 hours  Biotene Dry Mouth Oral Rinse 5 milliLiter(s) Swish and Spit five times a day  cycloSPORINE  (SandIMMUNE)  Solution 485 milliGRAM(s) Oral every 12 hours  diphenhydrAMINE   Injectable 50 milliGRAM(s) IV Push daily  eltrombopag 150 milliGRAM(s) Oral daily  levoFLOXacin  Tablet 500 milliGRAM(s) Oral every 24 hours  methylPREDNISolone sodium succinate Injectable 100 milliGRAM(s) IV Push every 8 hours  phytonadione   Solution 10 milliGRAM(s) Oral daily  posaconazole DR Tablet 300 milliGRAM(s) Oral daily  sodium chloride 0.9%. 1000 milliLiter(s) (75 mL/Hr) IV Continuous <Continuous>        PE:    General: Alert & Oriented x 3, non toxic, in no acute distress  Neuro: non focal  Cardiac: S1, S2, RRR  Pulm: Lungs CTA  GI: Abd soft, NT/ND, + bowel sounds x 4 quadrants  Ext: no edema, + pedal pulses BL  Skin: intact      AP: 33 y/o Male with no PMH p/w dizziness in setting of 2 weeks of fevers/chills, melena and easy bruising, found to be pancytopenic with Hb 3.6 and platelets of 2,000.  Patient has been transferred to [facility] from [facility] for further hematologic work-up. Bone Marrow biopsy performed 4/20 revealed Aplastic Anemia. Patient has pancytopenia secondary to disease process. Now with abdominal pain.    1.  abdominal pain       - spoke to hem/onc fellow - pain not likely ATG reaction; OK to continue ATG      - stool culture      - stool cdiff      - tylenol 650 mg PO x 1 for pain      - f/u with primary team in AM    Elli Martinez, ANP x [phone]

## 2020-04-26 NOTE — PROGRESS NOTE ADULT - ATTENDING COMMENTS
34M with no PMH presented to OSH with dizziness x 2 weeks, easy bruising x 2-4 weeks. At home had a syncopal event and called 911, found to have hemoglobin 3s, Platelets 2K, no evidence of DIC. Noted mild petechiae and has had gum bleeding. These have improved. Noted dark stools for 2 weeks. Peripheral smear with immature forms concerning for acute leukemia vs. aplastic anemia   -peripheral flow without blast population, FISH pending   -received ATRA 50 mg BID until PML-CHANTELLE negative   -Bone marrow evaluation(4/20) in conjunction with lab studies document severe aplastic anemia(no inciting factors identified).  I had lengthy discussion with patient on 4/22/20 regarding the diagnosis, prognosis and treatment recommendations at present time: hATG, Cyclosporine, Eltrombopag. Potential benefits as well as side effects of this combination regimen include(but not limited to) fever, chills, headache, dizziness, nausea, vomiting, diarrhea, musculoskeletal pains, liver dysfunction, and rare risk of serum sickness(ATG); kidney dysfunction, electrolyte abnormalities, hypertension, tremors, and with Eltrombopag- increased risk of blood clots if platelet count rapidly rises, liver dysfunction, new or worsening cataracts. Patient asked all of his questions and after all questions addressed, he made the decision to begin recommended treatment regimen. I explained that Prednisone use started with ATG is to prevent serum sickness.   - ATG test dose negative. Begin ATG, CSA, Eltrombopag on 4/23  -patient developed hives and short episode of wheezing with ATG infusion, which resolved- will continue Solumedrol pre-med with ATG(hold Prednisone), then resume Prednisone on day 5 to complete recommended course for prevention of serum sickness.  - IVF, mouth care; discontinued Allopurinol  -receiving transfusions if Hgb< 7, Plt <50k   - vit K for elevated INR  -optho consult given hazy vision, suspect retinal bleed given low platelets   -monitor for s/s of bleeding, gum bleeding intermittent   -Paranasal sinusitis on CT head(4/17)- cont Levaquin, begin Posaconazole; initiate Mepron and Acyclovir with immunosuppressive therapy. 34M with no PMH presented to OSH with dizziness x 2 weeks, easy bruising x 2-4 weeks. At home had a syncopal event and called 911, found to have hemoglobin 3s, Platelets 2K, no evidence of DIC. Noted mild petechiae and has had gum bleeding. These have improved. Noted dark stools for 2 weeks. Peripheral smear with immature forms concerning for acute leukemia vs. aplastic anemia   -peripheral flow without blast population, FISH pending   -received ATRA 50 mg BID until PML-CHANTELLE negative   -Bone marrow evaluation(4/20)c/w severe aplastic anemia. Treatment plan: hATG, Cyclosporine, Eltrombopag.   - ATG test dose negative. Begin ATG, CSA, Eltrombopag on 4/23  -patient developed hives and short episode of wheezing with ATG infusion, which resolved- will continue Solumedrol pre-med with ATG(hold Prednisone), then resume Prednisone on day 5 to complete recommended course for prevention of serum sickness.  - IVF, mouth care; discontinued Allopurinol  -receiving transfusions if Hgb< 7, Plt <50k   - s/p vit K for elevated INR  -optho consult given hazy vision, suspect retinal bleed given low platelets   -monitor for s/s of bleeding, gum bleeding intermittent   -Paranasal sinusitis on CT head(4/17)- cont Levaquin, begin Posaconazole; on Mepron and Acyclovir with immunosuppressive therapy.  - d4 of treatment, cont transfusion support prn

## 2020-04-27 LAB
ALBUMIN SERPL ELPH-MCNC: 3.6 G/DL — SIGNIFICANT CHANGE UP (ref 3.3–5)
ALBUMIN SERPL ELPH-MCNC: 3.9 G/DL — SIGNIFICANT CHANGE UP (ref 3.3–5)
ALP SERPL-CCNC: 64 U/L — SIGNIFICANT CHANGE UP (ref 40–120)
ALP SERPL-CCNC: 74 U/L — SIGNIFICANT CHANGE UP (ref 40–120)
ALT FLD-CCNC: 93 U/L — HIGH (ref 10–45)
ALT FLD-CCNC: 94 U/L — HIGH (ref 10–45)
ANION GAP SERPL CALC-SCNC: 13 MMOL/L — SIGNIFICANT CHANGE UP (ref 5–17)
ANION GAP SERPL CALC-SCNC: 15 MMOL/L — SIGNIFICANT CHANGE UP (ref 5–17)
APTT BLD: 21.7 SEC — LOW (ref 27.5–36.3)
APTT BLD: 22.6 SEC — LOW (ref 27.5–36.3)
AST SERPL-CCNC: 17 U/L — SIGNIFICANT CHANGE UP (ref 10–40)
AST SERPL-CCNC: 21 U/L — SIGNIFICANT CHANGE UP (ref 10–40)
BILIRUB SERPL-MCNC: 1.2 MG/DL — SIGNIFICANT CHANGE UP (ref 0.2–1.2)
BILIRUB SERPL-MCNC: 1.6 MG/DL — HIGH (ref 0.2–1.2)
BUN SERPL-MCNC: 18 MG/DL — SIGNIFICANT CHANGE UP (ref 7–23)
BUN SERPL-MCNC: 20 MG/DL — SIGNIFICANT CHANGE UP (ref 7–23)
CALCIUM SERPL-MCNC: 8.8 MG/DL — SIGNIFICANT CHANGE UP (ref 8.4–10.5)
CALCIUM SERPL-MCNC: 9 MG/DL — SIGNIFICANT CHANGE UP (ref 8.4–10.5)
CHLORIDE SERPL-SCNC: 101 MMOL/L — SIGNIFICANT CHANGE UP (ref 96–108)
CHLORIDE SERPL-SCNC: 103 MMOL/L — SIGNIFICANT CHANGE UP (ref 96–108)
CO2 SERPL-SCNC: 21 MMOL/L — LOW (ref 22–31)
CO2 SERPL-SCNC: 21 MMOL/L — LOW (ref 22–31)
CREAT SERPL-MCNC: 0.61 MG/DL — SIGNIFICANT CHANGE UP (ref 0.5–1.3)
CREAT SERPL-MCNC: 0.64 MG/DL — SIGNIFICANT CHANGE UP (ref 0.5–1.3)
CYCLOSPORINE SER-MCNC: 218 NG/ML — SIGNIFICANT CHANGE UP (ref 150–400)
D DIMER BLD IA.RAPID-MCNC: 576 NG/ML DDU — HIGH
D DIMER BLD IA.RAPID-MCNC: 579 NG/ML DDU — HIGH
FIBRINOGEN PPP-MCNC: 360 MG/DL — SIGNIFICANT CHANGE UP (ref 320–500)
FIBRINOGEN PPP-MCNC: 403 MG/DL — SIGNIFICANT CHANGE UP (ref 320–500)
GLUCOSE SERPL-MCNC: 157 MG/DL — HIGH (ref 70–99)
GLUCOSE SERPL-MCNC: 178 MG/DL — HIGH (ref 70–99)
HCT VFR BLD CALC: 21.1 % — LOW (ref 39–50)
HCT VFR BLD CALC: 24.4 % — LOW (ref 39–50)
HGB BLD-MCNC: 7.6 G/DL — LOW (ref 13–17)
HGB BLD-MCNC: 8.9 G/DL — LOW (ref 13–17)
INR BLD: 1.17 RATIO — HIGH (ref 0.88–1.16)
INR BLD: 1.24 RATIO — HIGH (ref 0.88–1.16)
LDH SERPL L TO P-CCNC: 149 U/L — SIGNIFICANT CHANGE UP (ref 50–242)
LDH SERPL L TO P-CCNC: 160 U/L — SIGNIFICANT CHANGE UP (ref 50–242)
MAGNESIUM SERPL-MCNC: 2.1 MG/DL — SIGNIFICANT CHANGE UP (ref 1.6–2.6)
MAGNESIUM SERPL-MCNC: 2.1 MG/DL — SIGNIFICANT CHANGE UP (ref 1.6–2.6)
MCHC RBC-ENTMCNC: 30.3 PG — SIGNIFICANT CHANGE UP (ref 27–34)
MCHC RBC-ENTMCNC: 30.3 PG — SIGNIFICANT CHANGE UP (ref 27–34)
MCHC RBC-ENTMCNC: 36 GM/DL — SIGNIFICANT CHANGE UP (ref 32–36)
MCHC RBC-ENTMCNC: 36.5 GM/DL — HIGH (ref 32–36)
MCV RBC AUTO: 83 FL — SIGNIFICANT CHANGE UP (ref 80–100)
MCV RBC AUTO: 84.1 FL — SIGNIFICANT CHANGE UP (ref 80–100)
NRBC # BLD: 0 /100 WBCS — SIGNIFICANT CHANGE UP (ref 0–0)
NRBC # BLD: 4 /100 WBCS — HIGH (ref 0–0)
PHOSPHATE SERPL-MCNC: 2.4 MG/DL — LOW (ref 2.5–4.5)
PHOSPHATE SERPL-MCNC: 3.1 MG/DL — SIGNIFICANT CHANGE UP (ref 2.5–4.5)
PLATELET # BLD AUTO: 29 K/UL — LOW (ref 150–400)
PLATELET # BLD AUTO: 68 K/UL — LOW (ref 150–400)
POTASSIUM SERPL-MCNC: 3.4 MMOL/L — LOW (ref 3.5–5.3)
POTASSIUM SERPL-MCNC: 3.9 MMOL/L — SIGNIFICANT CHANGE UP (ref 3.5–5.3)
POTASSIUM SERPL-SCNC: 3.4 MMOL/L — LOW (ref 3.5–5.3)
POTASSIUM SERPL-SCNC: 3.9 MMOL/L — SIGNIFICANT CHANGE UP (ref 3.5–5.3)
PROT SERPL-MCNC: 6.6 G/DL — SIGNIFICANT CHANGE UP (ref 6–8.3)
PROT SERPL-MCNC: 7.1 G/DL — SIGNIFICANT CHANGE UP (ref 6–8.3)
PROTHROM AB SERPL-ACNC: 13.4 SEC — HIGH (ref 10–12.9)
PROTHROM AB SERPL-ACNC: 14.4 SEC — HIGH (ref 10–12.9)
RBC # BLD: 2.51 M/UL — LOW (ref 4.2–5.8)
RBC # BLD: 2.94 M/UL — LOW (ref 4.2–5.8)
RBC # FLD: 12.3 % — SIGNIFICANT CHANGE UP (ref 10.3–14.5)
RBC # FLD: 12.4 % — SIGNIFICANT CHANGE UP (ref 10.3–14.5)
SODIUM SERPL-SCNC: 137 MMOL/L — SIGNIFICANT CHANGE UP (ref 135–145)
SODIUM SERPL-SCNC: 137 MMOL/L — SIGNIFICANT CHANGE UP (ref 135–145)
URATE SERPL-MCNC: 2.3 MG/DL — LOW (ref 3.4–8.8)
URATE SERPL-MCNC: 2.3 MG/DL — LOW (ref 3.4–8.8)
WBC # BLD: 0.24 K/UL — CRITICAL LOW (ref 3.8–10.5)
WBC # BLD: 0.41 K/UL — CRITICAL LOW (ref 3.8–10.5)
WBC # FLD AUTO: 0.24 K/UL — CRITICAL LOW (ref 3.8–10.5)
WBC # FLD AUTO: 0.41 K/UL — CRITICAL LOW (ref 3.8–10.5)

## 2020-04-27 PROCEDURE — 99232 SBSQ HOSP IP/OBS MODERATE 35: CPT | Mod: GC

## 2020-04-27 RX ORDER — CYCLOSPORINE 100 MG/1
4.85 CAPSULE ORAL
Qty: 291 | Refills: 0
Start: 2020-04-27 | End: 2020-05-26

## 2020-04-27 RX ORDER — PANTOPRAZOLE SODIUM 20 MG/1
40 TABLET, DELAYED RELEASE ORAL EVERY 12 HOURS
Refills: 0 | Status: COMPLETED | OUTPATIENT
Start: 2020-04-27 | End: 2020-04-27

## 2020-04-27 RX ORDER — PANTOPRAZOLE SODIUM 20 MG/1
40 TABLET, DELAYED RELEASE ORAL
Refills: 0 | Status: DISCONTINUED | OUTPATIENT
Start: 2020-04-28 | End: 2020-04-28

## 2020-04-27 RX ORDER — ELTROMBOPAG OLAMINE 50 MG/1
6 TABLET, FILM COATED ORAL
Qty: 180 | Refills: 3
Start: 2020-04-27 | End: 2020-08-24

## 2020-04-27 RX ADMIN — CYCLOSPORINE 485 MILLIGRAM(S): 100 CAPSULE ORAL at 06:19

## 2020-04-27 RX ADMIN — Medication 400 MILLIGRAM(S): at 21:57

## 2020-04-27 RX ADMIN — Medication 100 MILLIGRAM(S): at 01:44

## 2020-04-27 RX ADMIN — ATOVAQUONE 750 MILLIGRAM(S): 750 SUSPENSION ORAL at 06:18

## 2020-04-27 RX ADMIN — Medication 5 MILLILITER(S): at 00:11

## 2020-04-27 RX ADMIN — Medication 5 MILLILITER(S): at 15:56

## 2020-04-27 RX ADMIN — Medication 5 MILLILITER(S): at 22:11

## 2020-04-27 RX ADMIN — Medication 5 MILLILITER(S): at 19:09

## 2020-04-27 RX ADMIN — Medication 5 MILLILITER(S): at 09:18

## 2020-04-27 RX ADMIN — Medication 400 MILLIGRAM(S): at 06:18

## 2020-04-27 RX ADMIN — SODIUM CHLORIDE 75 MILLILITER(S): 9 INJECTION INTRAMUSCULAR; INTRAVENOUS; SUBCUTANEOUS at 19:09

## 2020-04-27 RX ADMIN — Medication 5 MILLILITER(S): at 11:13

## 2020-04-27 RX ADMIN — POSACONAZOLE 300 MILLIGRAM(S): 100 TABLET, DELAYED RELEASE ORAL at 11:13

## 2020-04-27 RX ADMIN — Medication 30 MILLILITER(S): at 06:26

## 2020-04-27 RX ADMIN — ELTROMBOPAG OLAMINE 150 MILLIGRAM(S): 50 TABLET, FILM COATED ORAL at 13:33

## 2020-04-27 RX ADMIN — Medication 400 MILLIGRAM(S): at 13:33

## 2020-04-27 RX ADMIN — SODIUM CHLORIDE 75 MILLILITER(S): 9 INJECTION INTRAMUSCULAR; INTRAVENOUS; SUBCUTANEOUS at 21:57

## 2020-04-27 RX ADMIN — Medication 30 MILLILITER(S): at 15:56

## 2020-04-27 RX ADMIN — Medication 100 MILLIGRAM(S): at 06:21

## 2020-04-27 RX ADMIN — Medication 30 MILLILITER(S): at 19:12

## 2020-04-27 RX ADMIN — ATOVAQUONE 750 MILLIGRAM(S): 750 SUSPENSION ORAL at 19:08

## 2020-04-27 RX ADMIN — CYCLOSPORINE 485 MILLIGRAM(S): 100 CAPSULE ORAL at 19:08

## 2020-04-27 RX ADMIN — PANTOPRAZOLE SODIUM 40 MILLIGRAM(S): 20 TABLET, DELAYED RELEASE ORAL at 11:12

## 2020-04-27 NOTE — PROGRESS NOTE ADULT - PROBLEM SELECTOR PLAN 1
4/20 BM Bx consistent with aplastic anemia  s/p ATG Intradermal test dose 4/22 - no reaction  start h-ATG, Cyclosporine, Prednisone, Eltrombopag 4/23  Horse ATG 40mg/kg/day = 3880 mg daily x 4 days  Cyclosporine 10mg/kg/day divided BID = 485mg BID  Eltrombopag 150mg daily  Strict I&Os, daily weights, mouth care  HLA typing sent out 4/20  CMV (-)  EBV,   Parvovirus (-)  HIV/Hepatitis panel negative  EMILIA negative  s/p reaction from ATG on 4/23 (hives)  4/24 will leave solumedrol 100mg IV q8 for the 4 days with ATG. d/c'd  prednisone. Start prednisone on day 5-10  (4/27-5/2) and then order taper.  Thrombocytopenia-replace plt 4/20 BM Bx consistent with aplastic anemia  s/p ATG Intradermal test dose 4/22 - no reaction  started h-ATG, Cyclosporine, Prednisone, Eltrombopag 4/23  Horse ATG 40mg/kg/day = 3880 mg daily x 4 days  Cyclosporine 10mg/kg/day divided BID = 485mg BID  Eltrombopag 150mg daily  Strict I&Os, daily weights, mouth care  HLA typing sent out 4/20  CMV (-)  EBV,   Parvovirus (-)  HIV/Hepatitis panel negative  EMILIA negative  s/p reaction from ATG on 4/23 (hives)  4/24 will leave solumedrol 100mg IV q8 for the 4 days with ATG. d/c'd  prednisone. Start prednisone on day 5-10  (4/27-5/2) and then order taper.  Thrombocytopenia-replace plt 4/20 BM Bx consistent with aplastic anemia  s/p ATG Intradermal test dose 4/22 - no reaction  started h-ATG, Cyclosporine, Prednisone, Eltrombopag 4/23  s/p Horse ATG 40mg/kg/day = 3880 mg daily x 4 days (4/23- 4/26)  Cyclosporine 10mg/kg/day divided BID = 485mg BID  Eltrombopag 150mg daily  Strict I&Os, daily weights, mouth care  HLA typing sent out 4/20  CMV (-)  EBV,   Parvovirus (-)  HIV/Hepatitis panel negative  EMILIA negative  s/p reaction from ATG on 4/23 (hives)  s/p solumedrol 100mg IV q8 for the 4 days with ATG.   Start prednisone today  for  days 5-10  (4/27-5/2) and then order taper.  Thrombocytopenia-replace plt  abdominal discomfort - PPI added today

## 2020-04-27 NOTE — PROGRESS NOTE ADULT - ASSESSMENT
33 y/o Male with no PMH p/w dizziness in setting of 2 weeks of fevers/chills, melena and easy bruising, found to be pancytopenic with Hb 3.6 and platelets of 2,000.  Patient has been transferred to [facility] from [facility] for further hematologic work-up. Bone Marrow biopsy performed 4/20 revealed Aplastic Anemia. Patient has pancytopenia secondary to disease process.

## 2020-04-27 NOTE — PROGRESS NOTE ADULT - PROBLEM SELECTOR PLAN 3
Intraretinal hemorrhage from thrombocytopenia + anemia  Transfuse to keep PLTs above 50k for now  Opthalmology [id number]:  Patient should follow up his/her ophthalmologist or in the [facility] within one week of discharge.    [address]  [place], [place] [id number] [phone] Intraretinal hemorrhage from thrombocytopenia + anemia  Transfuse to keep PLTs above 50k for now  Opthalmology [id number]-Ilfcvo-Pp:  Patient should follow up his/her ophthalmologist or in the [facility] within one week of discharge:    [address]  [place], [place] [id number] [phone]

## 2020-04-27 NOTE — PROGRESS NOTE ADULT - ATTENDING COMMENTS
34M with no PMH presented to OSH with dizziness x 2 weeks, easy bruising x 2-4 weeks. At home had a syncopal event and called 911, found to have hemoglobin 3s, Platelets 2K, no evidence of DIC. Noted mild petechiae and has had gum bleeding. These have improved. Noted dark stools for 2 weeks. Peripheral smear with immature forms concerning for acute leukemia vs. aplastic anemia   -peripheral flow without blast population, FISH pending   -received ATRA 50 mg BID until PML-CHANTELLE negative   -Bone marrow evaluation(4/20)c/w severe aplastic anemia. Treatment plan: hATG, Cyclosporine, Eltrombopag.   - ATG test dose negative. Begin ATG, CSA, Eltrombopag on 4/23  -patient developed hives and short episode of wheezing with ATG infusion, which resolved- will continue Solumedrol pre-med with ATG(hold Prednisone), then resume Prednisone on day 5 to complete recommended course for prevention of serum sickness.  - IVF, mouth care; discontinued Allopurinol  -receiving transfusions if Hgb< 7, Plt <50k   - s/p vit K for elevated INR  -optho consult given hazy vision, suspect retinal bleed given low platelets   -monitor for s/s of bleeding, gum bleeding intermittent   -Paranasal sinusitis on CT head(4/17)- cont Levaquin, begin Posaconazole; on Mepron and Acyclovir with immunosuppressive therapy.  - d4 of treatment, cont transfusion support prn 34M with no PMH presented to OSH with dizziness x 2 weeks, easy bruising x 2-4 weeks. At home had a syncopal event and called 911, found to have hemoglobin 3s, Platelets 2K, no evidence of DIC. Noted mild petechiae and has had gum bleeding. These have improved. Noted dark stools for 2 weeks. Peripheral smear with immature forms concerning for acute leukemia vs. aplastic anemia   -peripheral flow without blast population, FISH pending   -received ATRA 50 mg BID until PML-CHANTELLE negative   -Bone marrow evaluation(4/20)c/w severe aplastic anemia. Treatment plan: hATG, Cyclosporine, Eltrombopag.   - ATG test dose negative. Begin ATG, CSA, Eltrombopag on 4/23  -patient developed hives and short episode of wheezing with ATG infusion, which resolved- will continue Solumedrol pre-med with ATG(hold Prednisone), then resume Prednisone on day 5 to complete recommended course for prevention of serum sickness.  - IVF, mouth care; discontinued Allopurinol  -receiving transfusions if Hgb< 7, Plt <50k   - s/p vit K for elevated INR  -optho consult given hazy vision, suspect retinal bleed given low platelets. Platelet transfusional support for goal >50k.   -monitor for s/s of bleeding, gum bleeding intermittent   -Paranasal sinusitis on CT head(4/17)- cont Levaquin, begin Posaconazole; on Mepron and Acyclovir with immunosuppressive therapy.  - d5 of treatment, cont transfusion support prn

## 2020-04-27 NOTE — PROGRESS NOTE ADULT - PROBLEM SELECTOR PLAN 2
Patient is neutropenic, afebrile  Continue Levaquin and posaconazole for prophylaxis  adding Mepron, Acyclovir.   If febrile, panculture and change Levaquin to Cefepime  COVID-19 surveillance 4/22 (-) negative Patient is neutropenic, afebrile  Continue Levaquin, posaconazole, Mepron, Acyclovir for prophylaxis  If febrile, panculture and change Levaquin to Cefepime  COVID-19 surveillance 4/22 (-) negative

## 2020-04-27 NOTE — PROGRESS NOTE ADULT - SUBJECTIVE AND OBJECTIVE BOX
Diagnosis: Aplastic anemia    Protocol/Chemo Regimen: ATG/Cyclosporine/Eltrombopag (solumedrol until day 4 then prednisone day 5-10).     Day: 5    Pt endorsed: No complaints    Review of Systems: denies nausea, vomiting, diarrhea, chest pain, SOB    Pain scale: 0    Diet: regular    Allergies: No Known Allergies      ANTIMICROBIALS  acyclovir   Oral Tab/Cap 400 milliGRAM(s) Oral every 8 hours  atovaquone Suspension 750 milliGRAM(s) Oral every 12 hours  levoFLOXacin  Tablet 500 milliGRAM(s) Oral every 24 hours  posaconazole DR Tablet 300 milliGRAM(s) Oral daily      HEME/ONC MEDICATIONS  eltrombopag 150 milliGRAM(s) Oral daily      STANDING MEDICATIONS  antithymocyte globulin equine Skin Test 0.1 milliLiter(s) IntraDermal once  Biotene Dry Mouth Oral Rinse 5 milliLiter(s) Swish and Spit five times a day  cycloSPORINE  (SandIMMUNE)  Solution 485 milliGRAM(s) Oral every 12 hours  predniSONE   Tablet 100 milliGRAM(s) Oral daily  sodium chloride 0.9%. 1000 milliLiter(s) IV Continuous <Continuous>      PRN MEDICATIONS  acetaminophen   Tablet .. 650 milliGRAM(s) Oral every 6 hours PRN  acetaminophen   Tablet .. 650 milliGRAM(s) Oral once PRN  aluminum hydroxide/magnesium hydroxide/simethicone Suspension 30 milliLiter(s) Oral every 4 hours PRN  EPINEPHrine     1 mG/mL Injectable 0.3 milliGRAM(s) IntraMuscular once PRN  methylPREDNISolone sodium succinate IVPB 500 milliGRAM(s) IV Intermittent once PRN  ondansetron Injectable 4 milliGRAM(s) IV Push every 6 hours PRN        Vital Signs Last 24 Hrs  T(C): 36.6 (27 Apr 2020 05:34), Max: 36.9 (26 Apr 2020 19:15)  T(F): 97.9 (27 Apr 2020 05:34), Max: 98.5 (26 Apr 2020 20:15)  HR: 57 (27 Apr 2020 05:34) (56 - 86)  BP: 118/72 (27 Apr 2020 05:34) (112/71 - 146/78)  BP(mean): --  RR: 18 (27 Apr 2020 05:34) (18 - 18)  SpO2: 96% (27 Apr 2020 05:34) (93% - 99%)    PHYSICAL EXAM  General: adult in NAD  HEENT: clear oropharynx, anicteric sclera, pink conjunctiva  Neck: supple  CV: normal S1/S2 RRR  Lungs: positive air movement b/l ant lungs,clear to auscultation, no wheezes, no rales  Abdomen: soft non-tender non-distended, no hepatosplenomegaly  Ext: no clubbing cyanosis or edema  Skin: no rashes and no petechiae  Neuro: alert and oriented X 4, no focal deficits  Central Line: normal    LABS:    Blood Cultures:                           7.6    0.24  )-----------( 29       ( 27 Apr 2020 05:41 )             21.1         Mean Cell Volume : 84.1 fl  Mean Cell Hemoglobin : 30.3 pg  Mean Cell Hemoglobin Concentration : 36.0 gm/dL  Auto Neutrophil # : x  Auto Lymphocyte # : x  Auto Monocyte # : x  Auto Eosinophil # : x  Auto Basophil # : x  Auto Neutrophil % : x  Auto Lymphocyte % : x  Auto Monocyte % : x  Auto Eosinophil % : x  Auto Basophil % : x      04-27    137  |  103  |  20  ----------------------------<  157<H>  3.9   |  21<L>  |  0.64    Ca    9.0      27 Apr 2020 05:41  Phos  3.1     04-27  Mg     2.1     04-27    TPro  6.6  /  Alb  3.6  /  TBili  1.2  /  DBili  x   /  AST  21  /  ALT  94<H>  /  AlkPhos  64  04-27      Mg 2.1  Phos 3.1  Mg 2.2  Phos 2.7      PT/INR - ( 27 Apr 2020 05:41 )   PT: 14.4 sec;   INR: 1.24 ratio         PTT - ( 27 Apr 2020 05:41 )  PTT:22.6 sec      Uric Acid 2.3      Uric Acid 2.6        RADIOLOGY & ADDITIONAL STUDIES: Diagnosis: Aplastic anemia    Protocol/Chemo Regimen: ATG/Cyclosporine/Eltrombopag (solumedrol until day 4 then prednisone day 5-10).     Day: 5    Pt endorsed: No complaints    Review of Systems: denies nausea, vomiting, diarrhea, chest pain, SOB    Pain scale: 0    Diet: regular    Allergies: No Known Allergies      ANTIMICROBIALS  acyclovir   Oral Tab/Cap 400 milliGRAM(s) Oral every 8 hours  atovaquone Suspension 750 milliGRAM(s) Oral every 12 hours  levoFLOXacin  Tablet 500 milliGRAM(s) Oral every 24 hours  posaconazole DR Tablet 300 milliGRAM(s) Oral daily      HEME/ONC MEDICATIONS  eltrombopag 150 milliGRAM(s) Oral daily      STANDING MEDICATIONS  antithymocyte globulin equine Skin Test 0.1 milliLiter(s) IntraDermal once  Biotene Dry Mouth Oral Rinse 5 milliLiter(s) Swish and Spit five times a day  cycloSPORINE  (SandIMMUNE)  Solution 485 milliGRAM(s) Oral every 12 hours  predniSONE   Tablet 100 milliGRAM(s) Oral daily  sodium chloride 0.9%. 1000 milliLiter(s) IV Continuous <Continuous>      PRN MEDICATIONS  acetaminophen   Tablet .. 650 milliGRAM(s) Oral every 6 hours PRN  acetaminophen   Tablet .. 650 milliGRAM(s) Oral once PRN  aluminum hydroxide/magnesium hydroxide/simethicone Suspension 30 milliLiter(s) Oral every 4 hours PRN  EPINEPHrine     1 mG/mL Injectable 0.3 milliGRAM(s) IntraMuscular once PRN  methylPREDNISolone sodium succinate IVPB 500 milliGRAM(s) IV Intermittent once PRN  ondansetron Injectable 4 milliGRAM(s) IV Push every 6 hours PRN        Vital Signs Last 24 Hrs  T(C): 36.6 (27 Apr 2020 05:34), Max: 36.9 (26 Apr 2020 19:15)  T(F): 97.9 (27 Apr 2020 05:34), Max: 98.5 (26 Apr 2020 20:15)  HR: 57 (27 Apr 2020 05:34) (56 - 86)  BP: 118/72 (27 Apr 2020 05:34) (112/71 - 146/78)  RR: 18 (27 Apr 2020 05:34) (18 - 18)  SpO2: 96% (27 Apr 2020 05:34) (93% - 99%)      PHYSICAL EXAM  General: NAD  HEENT: clear oropharynx, anicteric sclera, pink conjunctiva  Neck: supple  CV: (+) S1/S2 RRR  Lungs: positive air movement b/l ant lungs, clear to auscultation, no wheezes, no rales  Abdomen: soft, non-tender, non-distended  Ext: no clubbing cyanosis or edema  Skin: no rashes and no petechiae  Neuro: alert and oriented X 3, no focal deficits  Central Line: PIV      LABS:    Cultures:   Culture - Stool (04.26.20 @ 04:51)    Specimen Source: .Stool Feces    Culture Results:   No enteric pathogens to date: Final culture pending  No enteric gram negative rods isolated      Culture - Urine (09.24.15 @ 19:14)    Specimen Source: .Urine Clean Catch (Midstream)    Culture Results:   No growth                          7.6    0.24  )-----------( 29       ( 27 Apr 2020 05:41 )             21.1       Mean Cell Volume : 84.1 fl  Mean Cell Hemoglobin : 30.3 pg  Mean Cell Hemoglobin Concentration : 36.0 gm/dL  Auto Neutrophil # : x  Auto Lymphocyte # : x  Auto Monocyte # : x  Auto Eosinophil # : x  Auto Basophil # : x  Auto Neutrophil % : x  Auto Lymphocyte % : x  Auto Monocyte % : x  Auto Eosinophil % : x  Auto Basophil % : x      04-27    137  |  103  |  20  ----------------------------<  157<H>  3.9   |  21<L>  |  0.64    Ca    9.0      27 Apr 2020 05:41  Phos  3.1     04-27  Mg     2.1     04-27    TPro  6.6  /  Alb  3.6  /  TBili  1.2  /  DBili  x   /  AST  21  /  ALT  94<H>  /  AlkPhos  64  04-27      PT/INR - ( 27 Apr 2020 05:41 )   PT: 14.4 sec;   INR: 1.24 ratio    PTT - ( 27 Apr 2020 05:41 )  PTT:22.6 sec      Uric Acid 2.3      RADIOLOGY & ADDITIONAL STUDIES:    from: CT Head No Cont (04.17.20 @ 16:11)   FINDINGS:    There is no acute intracranial hemorrhage or mass effect. The ventricles and sulci are normal in size for patient's age.     There is no extraaxial fluid collection.     There is no displaced calvarial fracture. The visualized orbits are within normal limits. Opacified bilateral frontal sinuses with hyperdense material suggesting inspissated secretions versus fungal sinusitis. Partially opacifiedbilateral ethmoid sinuses. Left maxillary sinus mucosal thickening. The mastoid air cells are well aerated.    IMPRESSION:   1.  No acute intracranial hemorrhage or mass effect.   2.  Paranasal sinusitis. Diagnosis: Aplastic anemia    Protocol/Chemo Regimen: ATG/Cyclosporine/Eltrombopag (solumedrol until day 4 then prednisone day 5-10).     Day: 5    Pt endorsed: abdominal discomfort    Review of Systems: denies nausea, vomiting, diarrhea, chest pain, SOB    Pain scale: 0    Diet: regular    Allergies: No Known Allergies      ANTIMICROBIALS  acyclovir   Oral Tab/Cap 400 milliGRAM(s) Oral every 8 hours  atovaquone Suspension 750 milliGRAM(s) Oral every 12 hours  levoFLOXacin  Tablet 500 milliGRAM(s) Oral every 24 hours  posaconazole DR Tablet 300 milliGRAM(s) Oral daily      HEME/ONC MEDICATIONS  eltrombopag 150 milliGRAM(s) Oral daily      STANDING MEDICATIONS  antithymocyte globulin equine Skin Test 0.1 milliLiter(s) IntraDermal once  Biotene Dry Mouth Oral Rinse 5 milliLiter(s) Swish and Spit five times a day  cycloSPORINE  (SandIMMUNE)  Solution 485 milliGRAM(s) Oral every 12 hours  predniSONE   Tablet 100 milliGRAM(s) Oral daily  sodium chloride 0.9%. 1000 milliLiter(s) IV Continuous <Continuous>      PRN MEDICATIONS  acetaminophen   Tablet .. 650 milliGRAM(s) Oral every 6 hours PRN  acetaminophen   Tablet .. 650 milliGRAM(s) Oral once PRN  aluminum hydroxide/magnesium hydroxide/simethicone Suspension 30 milliLiter(s) Oral every 4 hours PRN  EPINEPHrine     1 mG/mL Injectable 0.3 milliGRAM(s) IntraMuscular once PRN  methylPREDNISolone sodium succinate IVPB 500 milliGRAM(s) IV Intermittent once PRN  ondansetron Injectable 4 milliGRAM(s) IV Push every 6 hours PRN        Vital Signs Last 24 Hrs  T(C): 36.6 (27 Apr 2020 05:34), Max: 36.9 (26 Apr 2020 19:15)  T(F): 97.9 (27 Apr 2020 05:34), Max: 98.5 (26 Apr 2020 20:15)  HR: 57 (27 Apr 2020 05:34) (56 - 86)  BP: 118/72 (27 Apr 2020 05:34) (112/71 - 146/78)  RR: 18 (27 Apr 2020 05:34) (18 - 18)  SpO2: 96% (27 Apr 2020 05:34) (93% - 99%)      PHYSICAL EXAM  General: NAD  HEENT: clear oropharynx, anicteric sclera, pink conjunctiva  Neck: supple  CV: (+) S1/S2 RRR  Lungs: positive air movement b/l ant lungs, clear to auscultation, no wheezes, no rales  Abdomen: soft, non-tender, non-distended  Ext: no clubbing cyanosis or edema  Skin: no rashes and no petechiae  Neuro: alert and oriented X 3, no focal deficits  Central Line: PIV      LABS:    Cultures:   Culture - Stool (04.26.20 @ 04:51)    Specimen Source: .Stool Feces    Culture Results:   No enteric pathogens to date: Final culture pending  No enteric gram negative rods isolated      Culture - Urine (09.24.15 @ 19:14)    Specimen Source: .Urine Clean Catch (Midstream)    Culture Results:   No growth                          7.6    0.24  )-----------( 29       ( 27 Apr 2020 05:41 )             21.1       Mean Cell Volume : 84.1 fl  Mean Cell Hemoglobin : 30.3 pg  Mean Cell Hemoglobin Concentration : 36.0 gm/dL  Auto Neutrophil # : x  Auto Lymphocyte # : x  Auto Monocyte # : x  Auto Eosinophil # : x  Auto Basophil # : x  Auto Neutrophil % : x  Auto Lymphocyte % : x  Auto Monocyte % : x  Auto Eosinophil % : x  Auto Basophil % : x      04-27    137  |  103  |  20  ----------------------------<  157<H>  3.9   |  21<L>  |  0.64    Ca    9.0      27 Apr 2020 05:41  Phos  3.1     04-27  Mg     2.1     04-27    TPro  6.6  /  Alb  3.6  /  TBili  1.2  /  DBili  x   /  AST  21  /  ALT  94<H>  /  AlkPhos  64  04-27      PT/INR - ( 27 Apr 2020 05:41 )   PT: 14.4 sec;   INR: 1.24 ratio    PTT - ( 27 Apr 2020 05:41 )  PTT:22.6 sec      Uric Acid 2.3      RADIOLOGY & ADDITIONAL STUDIES:    from: CT Head No Cont (04.17.20 @ 16:11)   FINDINGS:    There is no acute intracranial hemorrhage or mass effect. The ventricles and sulci are normal in size for patient's age.     There is no extraaxial fluid collection.     There is no displaced calvarial fracture. The visualized orbits are within normal limits. Opacified bilateral frontal sinuses with hyperdense material suggesting inspissated secretions versus fungal sinusitis. Partially opacifiedbilateral ethmoid sinuses. Left maxillary sinus mucosal thickening. The mastoid air cells are well aerated.    IMPRESSION:   1.  No acute intracranial hemorrhage or mass effect.   2.  Paranasal sinusitis.

## 2020-04-28 LAB
ALBUMIN SERPL ELPH-MCNC: 3.6 G/DL — SIGNIFICANT CHANGE UP (ref 3.3–5)
ALBUMIN SERPL ELPH-MCNC: 3.9 G/DL — SIGNIFICANT CHANGE UP (ref 3.3–5)
ALP SERPL-CCNC: 64 U/L — SIGNIFICANT CHANGE UP (ref 40–120)
ALP SERPL-CCNC: 72 U/L — SIGNIFICANT CHANGE UP (ref 40–120)
ALT FLD-CCNC: 113 U/L — HIGH (ref 10–45)
ALT FLD-CCNC: 157 U/L — HIGH (ref 10–45)
ANION GAP SERPL CALC-SCNC: 12 MMOL/L — SIGNIFICANT CHANGE UP (ref 5–17)
ANION GAP SERPL CALC-SCNC: 13 MMOL/L — SIGNIFICANT CHANGE UP (ref 5–17)
APTT BLD: 21.9 SEC — LOW (ref 27.5–36.3)
APTT BLD: 22.4 SEC — LOW (ref 27.5–36.3)
AST SERPL-CCNC: 30 U/L — SIGNIFICANT CHANGE UP (ref 10–40)
AST SERPL-CCNC: 56 U/L — HIGH (ref 10–40)
BASOPHILS # BLD AUTO: 0 K/UL — SIGNIFICANT CHANGE UP (ref 0–0.2)
BASOPHILS NFR BLD AUTO: 0 % — SIGNIFICANT CHANGE UP (ref 0–2)
BILIRUB SERPL-MCNC: 1.7 MG/DL — HIGH (ref 0.2–1.2)
BILIRUB SERPL-MCNC: 2.3 MG/DL — HIGH (ref 0.2–1.2)
BUN SERPL-MCNC: 22 MG/DL — SIGNIFICANT CHANGE UP (ref 7–23)
BUN SERPL-MCNC: 26 MG/DL — HIGH (ref 7–23)
CALCIUM SERPL-MCNC: 8.6 MG/DL — SIGNIFICANT CHANGE UP (ref 8.4–10.5)
CALCIUM SERPL-MCNC: 9 MG/DL — SIGNIFICANT CHANGE UP (ref 8.4–10.5)
CHLORIDE SERPL-SCNC: 100 MMOL/L — SIGNIFICANT CHANGE UP (ref 96–108)
CHLORIDE SERPL-SCNC: 102 MMOL/L — SIGNIFICANT CHANGE UP (ref 96–108)
CHROM ANALY OVERALL INTERP SPEC-IMP: SIGNIFICANT CHANGE UP
CO2 SERPL-SCNC: 23 MMOL/L — SIGNIFICANT CHANGE UP (ref 22–31)
CO2 SERPL-SCNC: 24 MMOL/L — SIGNIFICANT CHANGE UP (ref 22–31)
CREAT SERPL-MCNC: 0.72 MG/DL — SIGNIFICANT CHANGE UP (ref 0.5–1.3)
CREAT SERPL-MCNC: 0.87 MG/DL — SIGNIFICANT CHANGE UP (ref 0.5–1.3)
CULTURE RESULTS: SIGNIFICANT CHANGE UP
D DIMER BLD IA.RAPID-MCNC: 449 NG/ML DDU — HIGH
D DIMER BLD IA.RAPID-MCNC: 468 NG/ML DDU — HIGH
EOSINOPHIL # BLD AUTO: 0 K/UL — SIGNIFICANT CHANGE UP (ref 0–0.5)
EOSINOPHIL NFR BLD AUTO: 0 % — SIGNIFICANT CHANGE UP (ref 0–6)
FIBRINOGEN PPP-MCNC: 363 MG/DL — SIGNIFICANT CHANGE UP (ref 320–500)
GLUCOSE SERPL-MCNC: 128 MG/DL — HIGH (ref 70–99)
GLUCOSE SERPL-MCNC: 150 MG/DL — HIGH (ref 70–99)
HCT VFR BLD CALC: 23.7 % — LOW (ref 39–50)
HCT VFR BLD CALC: 24.3 % — LOW (ref 39–50)
HGB BLD-MCNC: 8.6 G/DL — LOW (ref 13–17)
HGB BLD-MCNC: 8.8 G/DL — LOW (ref 13–17)
INR BLD: 1.07 RATIO — SIGNIFICANT CHANGE UP (ref 0.88–1.16)
INR BLD: 1.14 RATIO — SIGNIFICANT CHANGE UP (ref 0.88–1.16)
LDH SERPL L TO P-CCNC: 158 U/L — SIGNIFICANT CHANGE UP (ref 50–242)
LDH SERPL L TO P-CCNC: 190 U/L — SIGNIFICANT CHANGE UP (ref 50–242)
LYMPHOCYTES # BLD AUTO: 0.18 K/UL — LOW (ref 1–3.3)
LYMPHOCYTES # BLD AUTO: 37.7 % — SIGNIFICANT CHANGE UP (ref 13–44)
MAGNESIUM SERPL-MCNC: 2.1 MG/DL — SIGNIFICANT CHANGE UP (ref 1.6–2.6)
MAGNESIUM SERPL-MCNC: 2.2 MG/DL — SIGNIFICANT CHANGE UP (ref 1.6–2.6)
MCHC RBC-ENTMCNC: 29.6 PG — SIGNIFICANT CHANGE UP (ref 27–34)
MCHC RBC-ENTMCNC: 29.9 PG — SIGNIFICANT CHANGE UP (ref 27–34)
MCHC RBC-ENTMCNC: 36.2 GM/DL — HIGH (ref 32–36)
MCHC RBC-ENTMCNC: 36.3 GM/DL — HIGH (ref 32–36)
MCV RBC AUTO: 81.8 FL — SIGNIFICANT CHANGE UP (ref 80–100)
MCV RBC AUTO: 82.3 FL — SIGNIFICANT CHANGE UP (ref 80–100)
MONOCYTES # BLD AUTO: 0.06 K/UL — SIGNIFICANT CHANGE UP (ref 0–0.9)
MONOCYTES NFR BLD AUTO: 11.6 % — SIGNIFICANT CHANGE UP (ref 2–14)
NEUTROPHILS # BLD AUTO: 0.24 K/UL — LOW (ref 1.8–7.4)
NEUTROPHILS NFR BLD AUTO: 50.7 % — SIGNIFICANT CHANGE UP (ref 43–77)
NRBC # BLD: 0 /100 WBCS — SIGNIFICANT CHANGE UP (ref 0–0)
PHOSPHATE SERPL-MCNC: 2.9 MG/DL — SIGNIFICANT CHANGE UP (ref 2.5–4.5)
PHOSPHATE SERPL-MCNC: 3.2 MG/DL — SIGNIFICANT CHANGE UP (ref 2.5–4.5)
PLATELET # BLD AUTO: 58 K/UL — LOW (ref 150–400)
PLATELET # BLD AUTO: 59 K/UL — LOW (ref 150–400)
POTASSIUM SERPL-MCNC: 3.5 MMOL/L — SIGNIFICANT CHANGE UP (ref 3.5–5.3)
POTASSIUM SERPL-MCNC: 3.8 MMOL/L — SIGNIFICANT CHANGE UP (ref 3.5–5.3)
POTASSIUM SERPL-SCNC: 3.5 MMOL/L — SIGNIFICANT CHANGE UP (ref 3.5–5.3)
POTASSIUM SERPL-SCNC: 3.8 MMOL/L — SIGNIFICANT CHANGE UP (ref 3.5–5.3)
PROT SERPL-MCNC: 6.5 G/DL — SIGNIFICANT CHANGE UP (ref 6–8.3)
PROT SERPL-MCNC: 7 G/DL — SIGNIFICANT CHANGE UP (ref 6–8.3)
PROTHROM AB SERPL-ACNC: 12.3 SEC — SIGNIFICANT CHANGE UP (ref 10–12.9)
PROTHROM AB SERPL-ACNC: 13.2 SEC — HIGH (ref 10–12.9)
RBC # BLD: 2.88 M/UL — LOW (ref 4.2–5.8)
RBC # BLD: 2.97 M/UL — LOW (ref 4.2–5.8)
RBC # FLD: 12.2 % — SIGNIFICANT CHANGE UP (ref 10.3–14.5)
RBC # FLD: 12.3 % — SIGNIFICANT CHANGE UP (ref 10.3–14.5)
SODIUM SERPL-SCNC: 137 MMOL/L — SIGNIFICANT CHANGE UP (ref 135–145)
SODIUM SERPL-SCNC: 137 MMOL/L — SIGNIFICANT CHANGE UP (ref 135–145)
SPECIMEN SOURCE: SIGNIFICANT CHANGE UP
URATE SERPL-MCNC: 2.9 MG/DL — LOW (ref 3.4–8.8)
URATE SERPL-MCNC: 2.9 MG/DL — LOW (ref 3.4–8.8)
WBC # BLD: 0.48 K/UL — CRITICAL LOW (ref 3.8–10.5)
WBC # BLD: 0.48 K/UL — CRITICAL LOW (ref 3.8–10.5)
WBC # FLD AUTO: 0.48 K/UL — CRITICAL LOW (ref 3.8–10.5)
WBC # FLD AUTO: 0.48 K/UL — CRITICAL LOW (ref 3.8–10.5)

## 2020-04-28 PROCEDURE — 99233 SBSQ HOSP IP/OBS HIGH 50: CPT | Mod: GC

## 2020-04-28 RX ORDER — FAMOTIDINE 10 MG/ML
20 INJECTION INTRAVENOUS DAILY
Refills: 0 | Status: DISCONTINUED | OUTPATIENT
Start: 2020-04-28 | End: 2020-04-30

## 2020-04-28 RX ADMIN — Medication 30 MILLILITER(S): at 22:15

## 2020-04-28 RX ADMIN — SODIUM CHLORIDE 75 MILLILITER(S): 9 INJECTION INTRAMUSCULAR; INTRAVENOUS; SUBCUTANEOUS at 05:29

## 2020-04-28 RX ADMIN — Medication 400 MILLIGRAM(S): at 22:14

## 2020-04-28 RX ADMIN — CYCLOSPORINE 485 MILLIGRAM(S): 100 CAPSULE ORAL at 05:29

## 2020-04-28 RX ADMIN — CYCLOSPORINE 485 MILLIGRAM(S): 100 CAPSULE ORAL at 17:19

## 2020-04-28 RX ADMIN — Medication 5 MILLILITER(S): at 17:19

## 2020-04-28 RX ADMIN — POSACONAZOLE 300 MILLIGRAM(S): 100 TABLET, DELAYED RELEASE ORAL at 11:22

## 2020-04-28 RX ADMIN — Medication 5 MILLILITER(S): at 22:15

## 2020-04-28 RX ADMIN — ATOVAQUONE 750 MILLIGRAM(S): 750 SUSPENSION ORAL at 05:29

## 2020-04-28 RX ADMIN — ELTROMBOPAG OLAMINE 150 MILLIGRAM(S): 50 TABLET, FILM COATED ORAL at 11:22

## 2020-04-28 RX ADMIN — Medication 400 MILLIGRAM(S): at 13:16

## 2020-04-28 RX ADMIN — Medication 30 MILLILITER(S): at 17:22

## 2020-04-28 RX ADMIN — Medication 100 MILLIGRAM(S): at 05:29

## 2020-04-28 RX ADMIN — FAMOTIDINE 20 MILLIGRAM(S): 10 INJECTION INTRAVENOUS at 11:21

## 2020-04-28 RX ADMIN — Medication 5 MILLILITER(S): at 09:06

## 2020-04-28 RX ADMIN — Medication 5 MILLILITER(S): at 11:21

## 2020-04-28 RX ADMIN — Medication 30 MILLILITER(S): at 05:40

## 2020-04-28 RX ADMIN — Medication 400 MILLIGRAM(S): at 05:29

## 2020-04-28 RX ADMIN — ATOVAQUONE 750 MILLIGRAM(S): 750 SUSPENSION ORAL at 17:19

## 2020-04-28 NOTE — PROGRESS NOTE ADULT - ATTENDING COMMENTS
34M with no PMH presented to OSH with dizziness x 2 weeks, easy bruising x 2-4 weeks. At home had a syncopal event and called 911, found to have hemoglobin 3s, Platelets 2K, no evidence of DIC. Noted mild petechiae and has had gum bleeding. These have improved. Noted dark stools for 2 weeks. Peripheral smear with immature forms concerning for acute leukemia vs. aplastic anemia   -peripheral flow without blast population, FISH pending   -received ATRA 50 mg BID until PML-CHANTELLE negative   -Bone marrow evaluation(4/20)c/w severe aplastic anemia. Treatment plan: hATG, Cyclosporine, Eltrombopag.   - ATG test dose negative. Begin ATG, CSA, Eltrombopag on 4/23  -patient developed hives and short episode of wheezing with ATG infusion, which resolved- will continue Solumedrol pre-med with ATG(hold Prednisone), then resume Prednisone on day 5 to complete recommended course for prevention of serum sickness.  - IVF, mouth care; discontinued Allopurinol  -receiving transfusions if Hgb< 7, Plt <50k   - s/p vit K for elevated INR  -optho consult given hazy vision, suspect retinal bleed given low platelets. Platelet transfusional support for goal >50k.   -monitor for s/s of bleeding, gum bleeding intermittent   -Paranasal sinusitis on CT head(4/17)- cont Levaquin, begin Posaconazole; on Mepron and Acyclovir with immunosuppressive therapy.  - d5 of treatment, cont transfusion support prn 34M with no PMH presented to OSH with dizziness x 2 weeks, easy bruising x 2-4 weeks. At home had a syncopal event and called 911, found to have hemoglobin 3s, Platelets 2K, no evidence of DIC. Noted mild petechiae and has had gum bleeding. These have improved. Noted dark stools for 2 weeks. Peripheral smear with immature forms concerning for acute leukemia vs. aplastic anemia   -peripheral flow without blast population, FISH pending   -received ATRA 50 mg BID until PML-CHANTELLE negative   -Bone marrow evaluation(4/20)c/w severe aplastic anemia. Treatment plan: hATG, Cyclosporine, Eltrombopag.   - ATG test dose negative. Begin ATG, CSA, Eltrombopag on 4/23  -patient developed hives and short episode of wheezing with ATG infusion, which resolved- will continue Solumedrol pre-med with ATG(hold Prednisone), then resume Prednisone on day 5 to complete recommended course for prevention of serum sickness.  - IVF, mouth care; discontinued Allopurinol  -receiving transfusions if Hgb< 7, Plt <50k   - s/p vit K for elevated INR  -optho consult given hazy vision, suspect retinal bleed given low platelets. Platelet transfusional support for goal >50k.   -monitor for s/s of bleeding, gum bleeding intermittent   -Paranasal sinusitis on CT head(4/17)- cont Levaquin, begin Posaconazole; on Mepron and Acyclovir with immunosuppressive therapy.  - d6 of treatment, cont transfusion support prn

## 2020-04-28 NOTE — PROGRESS NOTE ADULT - PROBLEM SELECTOR PLAN 1
4/20 BM Bx consistent with aplastic anemia  s/p ATG Intradermal test dose 4/22 - no reaction  started h-ATG, Cyclosporine, Prednisone, Eltrombopag 4/23  s/p Horse ATG 40mg/kg/day = 3880 mg daily x 4 days (4/23- 4/26)  Cyclosporine 10mg/kg/day divided BID = 485mg BID  Eltrombopag 150mg daily  Follow up CBC, CMP daily  Keep PLTs > 50k for retinal hemorrhage  Strict I&Os, daily weights, mouth care  HLA typing sent out 4/20  CMV (-)  EBV,   Parvovirus (-)  HIV/Hepatitis panel negative  EMILIA negative  s/p reaction from ATG on 4/23 (hives)  s/p solumedrol 100mg IV q8 for the 4 days with ATG.   Contniue prednisone for days 5-10  (4/27-5/2) and then order taper.  abdominal discomfort - PPI added 4/20 BM Bx consistent with aplastic anemia  s/p ATG Intradermal test dose 4/22 - no reaction  started h-ATG, Cyclosporine, Prednisone, Eltrombopag 4/23  s/p Horse ATG 40mg/kg/day = 3880 mg daily x 4 days (4/23- 4/26)  Cyclosporine 10mg/kg/day divided BID = 485mg BID  Eltrombopag 150mg daily  Follow up CBC, CMP daily  Keep PLTs > 50k for retinal hemorrhage  Strict I&Os, daily weights, mouth care  HLA typing sent out 4/20  CMV (-)  EBV,   Parvovirus (-)  HIV/Hepatitis panel negative  EMILIA negative  s/p reaction from ATG on 4/23 (hives)  s/p solumedrol 100mg IV q8 for the 4 days with ATG.   Contniue prednisone for days 5-10  (4/27-5/2) and then order taper.  abdominal discomfort - pepcid started, symptoms improved with Maalox 4/20 BM Bx consistent with aplastic anemia  started h-ATG, Cyclosporine, Prednisone, Eltrombopag 4/23  s/p Horse ATG 40mg/kg/day = 3880 mg daily x 4 days (4/23- 4/26)  Cyclosporine 10mg/kg/day divided BID = 485mg BID  Eltrombopag 150mg daily  Follow up CBC, CMP daily  Keep PLTs > 50k for retinal hemorrhage  Strict I&Os, daily weights, mouth care  s/p reaction from ATG on 4/23 (hives)  s/p solumedrol 100mg IV q8 for the 4 days with ATG.   Contniue prednisone for days 5-10  (4/27-5/2) and then order taper.  abdominal discomfort - pepcid started, symptoms improved with Maalox  Hyperbilirubinemia 2.3 - will check Direct Bili  Mild Grade 1  ALT Transaminitis will follow repeat levels

## 2020-04-28 NOTE — PROGRESS NOTE ADULT - ASSESSMENT
35 y/o Male with no PMH p/w dizziness in setting of 2 weeks of fevers/chills, melena and easy bruising, found to be pancytopenic with Hb 3.6 and platelets of 2,000.  Patient has been transferred to [facility] from [facility] for further hematologic work-up. Bone Marrow biopsy performed 4/20 revealed Aplastic Anemia. Patient has pancytopenia secondary to disease process.

## 2020-04-28 NOTE — PROGRESS NOTE ADULT - SUBJECTIVE AND OBJECTIVE BOX
Diagnosis: Aplastic anemia    Protocol/Chemo Regimen: ATG/Cyclosporine/Eltrombopag (solumedrol until day 4 then prednisone day 5-10).     Day: 6    Pt endorsed: abdominal discomfort    Review of Systems: denies nausea, vomiting, diarrhea, chest pain, SOB    Pain scale: 0    Diet: regular    Allergies: No Known Allergies    ANTIMICROBIALS  acyclovir   Oral Tab/Cap 400 milliGRAM(s) Oral every 8 hours  atovaquone Suspension 750 milliGRAM(s) Oral every 12 hours  levoFLOXacin  Tablet 500 milliGRAM(s) Oral every 24 hours  posaconazole DR Tablet 300 milliGRAM(s) Oral daily      HEME/ONC MEDICATIONS  eltrombopag 150 milliGRAM(s) Oral daily      STANDING MEDICATIONS  antithymocyte globulin equine Skin Test 0.1 milliLiter(s) IntraDermal once  Biotene Dry Mouth Oral Rinse 5 milliLiter(s) Swish and Spit five times a day  cycloSPORINE  (SandIMMUNE)  Solution 485 milliGRAM(s) Oral every 12 hours  pantoprazole    Tablet 40 milliGRAM(s) Oral before breakfast  predniSONE   Tablet 100 milliGRAM(s) Oral daily  sodium chloride 0.9%. 1000 milliLiter(s) IV Continuous <Continuous>      PRN MEDICATIONS  acetaminophen   Tablet .. 650 milliGRAM(s) Oral every 6 hours PRN  acetaminophen   Tablet .. 650 milliGRAM(s) Oral once PRN  aluminum hydroxide/magnesium hydroxide/simethicone Suspension 30 milliLiter(s) Oral every 4 hours PRN  EPINEPHrine     1 mG/mL Injectable 0.3 milliGRAM(s) IntraMuscular once PRN  methylPREDNISolone sodium succinate IVPB 500 milliGRAM(s) IV Intermittent once PRN  ondansetron Injectable 4 milliGRAM(s) IV Push every 6 hours PRN      Vital Signs Last 24 Hrs  T(C): 35.9 (28 Apr 2020 05:22), Max: 36.6 (27 Apr 2020 21:12)  T(F): 96.6 (28 Apr 2020 05:22), Max: 97.9 (27 Apr 2020 21:12)  HR: 62 (28 Apr 2020 05:22) (56 - 73)  BP: 128/82 (28 Apr 2020 05:22) (122/75 - 134/74)  RR: 18 (28 Apr 2020 05:22) (18 - 18)  SpO2: 97% (28 Apr 2020 05:22) (96% - 98%)    PHYSICAL EXAM  General: NAD  HEENT: clear oropharynx, anicteric sclera, pink conjunctiva  Neck: supple  CV: (+) S1/S2 RRR  Lungs: positive air movement b/l ant lungs, clear to auscultation, no wheezes, no rales  Abdomen: soft, non-tender, non-distended  Ext: no clubbing cyanosis or edema  Skin: no rashes and no petechiae  Neuro: alert and oriented X 3, no focal deficits  Central Line: PIV      LABS:    Cultures:   Culture - Stool (04.26.20 @ 04:51)    Specimen Source: .Stool Feces    Culture Results:   No enteric pathogens to date: Final culture pending  No enteric gram negative rods isolated      Culture - Urine (09.24.15 @ 19:14)    Specimen Source: .Urine Clean Catch (Midstream)    Culture Results:   No growth             RADIOLOGY & ADDITIONAL STUDIES:    from: CT Head No Cont (04.17.20 @ 16:11)   IMPRESSION:   1.  No acute intracranial hemorrhage or mass effect.   2.  Paranasal sinusitis. Diagnosis: Aplastic anemia    Protocol/Chemo Regimen: ATG/Cyclosporine/Eltrombopag (solumedrol until day 4 then prednisone day 5-10).     Day: 6    Pt endorsed: abdominal discomfort improved with Maalox    Review of Systems: denies nausea, vomiting, diarrhea, chest pain, SOB    Pain scale: 0    Diet: regular    Allergies: No Known Allergies    ANTIMICROBIALS  acyclovir   Oral Tab/Cap 400 milliGRAM(s) Oral every 8 hours  atovaquone Suspension 750 milliGRAM(s) Oral every 12 hours  levoFLOXacin  Tablet 500 milliGRAM(s) Oral every 24 hours  posaconazole DR Tablet 300 milliGRAM(s) Oral daily      HEME/ONC MEDICATIONS  eltrombopag 150 milliGRAM(s) Oral daily      STANDING MEDICATIONS  antithymocyte globulin equine Skin Test 0.1 milliLiter(s) IntraDermal once  Biotene Dry Mouth Oral Rinse 5 milliLiter(s) Swish and Spit five times a day  cycloSPORINE  (SandIMMUNE)  Solution 485 milliGRAM(s) Oral every 12 hours  pantoprazole    Tablet 40 milliGRAM(s) Oral before breakfast  predniSONE   Tablet 100 milliGRAM(s) Oral daily  sodium chloride 0.9%. 1000 milliLiter(s) IV Continuous <Continuous>      PRN MEDICATIONS  acetaminophen   Tablet .. 650 milliGRAM(s) Oral every 6 hours PRN  acetaminophen   Tablet .. 650 milliGRAM(s) Oral once PRN  aluminum hydroxide/magnesium hydroxide/simethicone Suspension 30 milliLiter(s) Oral every 4 hours PRN  EPINEPHrine     1 mG/mL Injectable 0.3 milliGRAM(s) IntraMuscular once PRN  methylPREDNISolone sodium succinate IVPB 500 milliGRAM(s) IV Intermittent once PRN  ondansetron Injectable 4 milliGRAM(s) IV Push every 6 hours PRN      Vital Signs Last 24 Hrs  T(C): 35.9 (28 Apr 2020 05:22), Max: 36.6 (27 Apr 2020 21:12)  T(F): 96.6 (28 Apr 2020 05:22), Max: 97.9 (27 Apr 2020 21:12)  HR: 62 (28 Apr 2020 05:22) (56 - 73)  BP: 128/82 (28 Apr 2020 05:22) (122/75 - 134/74)  RR: 18 (28 Apr 2020 05:22) (18 - 18)  SpO2: 97% (28 Apr 2020 05:22) (96% - 98%)    PHYSICAL EXAM  General: NAD  HEENT: clear oropharynx, anicteric sclera, pink conjunctiva  Neck: supple  CV: (+) S1/S2 RRR  Lungs: positive air movement b/l ant lungs, clear to auscultation, no wheezes, no rales  Abdomen: soft, non-tender, non-distended  Ext: no clubbing cyanosis or edema  Skin: no rashes and no petechiae  Neuro: alert and oriented X 3, no focal deficits  Central Line: PIV      LABS:    Cultures:   Culture - Stool (04.26.20 @ 04:51)    Specimen Source: .Stool Feces    Culture Results:   No enteric pathogens to date: Final culture pending  No enteric gram negative rods isolated      Culture - Urine (09.24.15 @ 19:14)    Specimen Source: .Urine Clean Catch (Midstream)    Culture Results:   No growth                          8.8    0.48  )-----------( 59       ( 28 Apr 2020 09:58 )             24.3     28 Apr 2020 09:58    137    |  100    |  22     ----------------------------<  128    3.8     |  24     |  0.72     Ca    9.0        28 Apr 2020 09:58  Phos  3.2       28 Apr 2020 09:58  Mg     2.2       28 Apr 2020 09:58    TPro  7.0    /  Alb  3.9    /  TBili  2.3    /  DBili  x      /  AST  30     /  ALT  113    /  AlkPhos  64     28 Apr 2020 09:58    PT/INR - ( 28 Apr 2020 09:58 )   PT: 13.2 sec;   INR: 1.14 ratio    PTT - ( 28 Apr 2020 09:58 )  PTT:21.9 sec    LIVER FUNCTIONS - ( 28 Apr 2020 09:58 )  Alb: 3.9 g/dL / Pro: 7.0 g/dL / ALK PHOS: 64 U/L / ALT: 113 U/L / AST: 30 U/L / GGT: x       	           RADIOLOGY & ADDITIONAL STUDIES:    from: CT Head No Cont (04.17.20 @ 16:11)   IMPRESSION:   1.  No acute intracranial hemorrhage or mass effect.   2.  Paranasal sinusitis.

## 2020-04-28 NOTE — PROGRESS NOTE ADULT - PROBLEM SELECTOR PLAN 3
Intraretinal hemorrhage from thrombocytopenia + anemia  Transfuse to keep PLTs above 50k for now  Opthalmology [id number]:  Patient should follow up his/her ophthalmologist or in the [facility] within one week of discharge:    [address]  [place], [place] [id number] [phone]

## 2020-04-28 NOTE — PROGRESS NOTE ADULT - PROBLEM SELECTOR PLAN 2
Patient is neutropenic, afebrile  Continue Levaquin, posaconazole, Mepron, Acyclovir for prophylaxis  If febrile, panculture and change Levaquin to Cefepime  COVID-19 surveillance 4/22 (-) negative

## 2020-04-29 LAB
ALBUMIN SERPL ELPH-MCNC: 3.2 G/DL — LOW (ref 3.3–5)
ALBUMIN SERPL ELPH-MCNC: 3.7 G/DL — SIGNIFICANT CHANGE UP (ref 3.3–5)
ALP SERPL-CCNC: 61 U/L — SIGNIFICANT CHANGE UP (ref 40–120)
ALP SERPL-CCNC: 74 U/L — SIGNIFICANT CHANGE UP (ref 40–120)
ALT FLD-CCNC: 241 U/L — HIGH (ref 10–45)
ALT FLD-CCNC: 248 U/L — HIGH (ref 10–45)
ANION GAP SERPL CALC-SCNC: 11 MMOL/L — SIGNIFICANT CHANGE UP (ref 5–17)
ANION GAP SERPL CALC-SCNC: 16 MMOL/L — SIGNIFICANT CHANGE UP (ref 5–17)
APTT BLD: 22.6 SEC — LOW (ref 27.5–36.3)
APTT BLD: 23 SEC — LOW (ref 27.5–36.3)
AST SERPL-CCNC: 55 U/L — HIGH (ref 10–40)
AST SERPL-CCNC: 71 U/L — HIGH (ref 10–40)
BASOPHILS # BLD AUTO: 0 K/UL — SIGNIFICANT CHANGE UP (ref 0–0.2)
BASOPHILS NFR BLD AUTO: 0 % — SIGNIFICANT CHANGE UP (ref 0–2)
BILIRUB DIRECT SERPL-MCNC: 0.4 MG/DL — HIGH (ref 0–0.2)
BILIRUB SERPL-MCNC: 1.5 MG/DL — HIGH (ref 0.2–1.2)
BILIRUB SERPL-MCNC: 1.9 MG/DL — HIGH (ref 0.2–1.2)
BUN SERPL-MCNC: 21 MG/DL — SIGNIFICANT CHANGE UP (ref 7–23)
BUN SERPL-MCNC: 28 MG/DL — HIGH (ref 7–23)
CALCIUM SERPL-MCNC: 8.2 MG/DL — LOW (ref 8.4–10.5)
CALCIUM SERPL-MCNC: 8.3 MG/DL — LOW (ref 8.4–10.5)
CHLORIDE SERPL-SCNC: 101 MMOL/L — SIGNIFICANT CHANGE UP (ref 96–108)
CHLORIDE SERPL-SCNC: 104 MMOL/L — SIGNIFICANT CHANGE UP (ref 96–108)
CO2 SERPL-SCNC: 21 MMOL/L — LOW (ref 22–31)
CO2 SERPL-SCNC: 24 MMOL/L — SIGNIFICANT CHANGE UP (ref 22–31)
CREAT SERPL-MCNC: 0.7 MG/DL — SIGNIFICANT CHANGE UP (ref 0.5–1.3)
CREAT SERPL-MCNC: 0.85 MG/DL — SIGNIFICANT CHANGE UP (ref 0.5–1.3)
D DIMER BLD IA.RAPID-MCNC: 372 NG/ML DDU — HIGH
D DIMER BLD IA.RAPID-MCNC: 380 NG/ML DDU — HIGH
EOSINOPHIL # BLD AUTO: 0 K/UL — SIGNIFICANT CHANGE UP (ref 0–0.5)
EOSINOPHIL NFR BLD AUTO: 0 % — SIGNIFICANT CHANGE UP (ref 0–6)
FIBRINOGEN PPP-MCNC: 330 MG/DL — SIGNIFICANT CHANGE UP (ref 320–500)
FIBRINOGEN PPP-MCNC: 375 MG/DL — SIGNIFICANT CHANGE UP (ref 320–500)
FIBRINOGEN PPP-MCNC: 432 MG/DL — SIGNIFICANT CHANGE UP (ref 320–500)
GLUCOSE SERPL-MCNC: 153 MG/DL — HIGH (ref 70–99)
GLUCOSE SERPL-MCNC: 98 MG/DL — SIGNIFICANT CHANGE UP (ref 70–99)
HCT VFR BLD CALC: 22.5 % — LOW (ref 39–50)
HCT VFR BLD CALC: 23.3 % — LOW (ref 39–50)
HGB BLD-MCNC: 8.1 G/DL — LOW (ref 13–17)
HGB BLD-MCNC: 8.5 G/DL — LOW (ref 13–17)
INR BLD: 1.03 RATIO — SIGNIFICANT CHANGE UP (ref 0.88–1.16)
INR BLD: 1.08 RATIO — SIGNIFICANT CHANGE UP (ref 0.88–1.16)
LDH SERPL L TO P-CCNC: 167 U/L — SIGNIFICANT CHANGE UP (ref 50–242)
LDH SERPL L TO P-CCNC: 172 U/L — SIGNIFICANT CHANGE UP (ref 50–242)
LYMPHOCYTES # BLD AUTO: 0.44 K/UL — LOW (ref 1–3.3)
LYMPHOCYTES # BLD AUTO: 67.2 % — HIGH (ref 13–44)
MAGNESIUM SERPL-MCNC: 2 MG/DL — SIGNIFICANT CHANGE UP (ref 1.6–2.6)
MAGNESIUM SERPL-MCNC: 2 MG/DL — SIGNIFICANT CHANGE UP (ref 1.6–2.6)
MCHC RBC-ENTMCNC: 30 PG — SIGNIFICANT CHANGE UP (ref 27–34)
MCHC RBC-ENTMCNC: 30 PG — SIGNIFICANT CHANGE UP (ref 27–34)
MCHC RBC-ENTMCNC: 36 GM/DL — SIGNIFICANT CHANGE UP (ref 32–36)
MCHC RBC-ENTMCNC: 36.5 GM/DL — HIGH (ref 32–36)
MCV RBC AUTO: 82.3 FL — SIGNIFICANT CHANGE UP (ref 80–100)
MCV RBC AUTO: 83.3 FL — SIGNIFICANT CHANGE UP (ref 80–100)
MONOCYTES # BLD AUTO: 0.06 K/UL — SIGNIFICANT CHANGE UP (ref 0–0.9)
MONOCYTES NFR BLD AUTO: 9.8 % — SIGNIFICANT CHANGE UP (ref 2–14)
NEUTROPHILS # BLD AUTO: 0.15 K/UL — LOW (ref 1.8–7.4)
NEUTROPHILS NFR BLD AUTO: 23 % — LOW (ref 43–77)
NRBC # BLD: 0 /100 WBCS — SIGNIFICANT CHANGE UP (ref 0–0)
PHOSPHATE SERPL-MCNC: 2.6 MG/DL — SIGNIFICANT CHANGE UP (ref 2.5–4.5)
PHOSPHATE SERPL-MCNC: 3 MG/DL — SIGNIFICANT CHANGE UP (ref 2.5–4.5)
PLATELET # BLD AUTO: 42 K/UL — LOW (ref 150–400)
PLATELET # BLD AUTO: 84 K/UL — LOW (ref 150–400)
POTASSIUM SERPL-MCNC: 3.5 MMOL/L — SIGNIFICANT CHANGE UP (ref 3.5–5.3)
POTASSIUM SERPL-MCNC: 3.9 MMOL/L — SIGNIFICANT CHANGE UP (ref 3.5–5.3)
POTASSIUM SERPL-SCNC: 3.5 MMOL/L — SIGNIFICANT CHANGE UP (ref 3.5–5.3)
POTASSIUM SERPL-SCNC: 3.9 MMOL/L — SIGNIFICANT CHANGE UP (ref 3.5–5.3)
PROT SERPL-MCNC: 5.9 G/DL — LOW (ref 6–8.3)
PROT SERPL-MCNC: 6.5 G/DL — SIGNIFICANT CHANGE UP (ref 6–8.3)
PROTHROM AB SERPL-ACNC: 11.8 SEC — SIGNIFICANT CHANGE UP (ref 10–12.9)
PROTHROM AB SERPL-ACNC: 12.3 SEC — SIGNIFICANT CHANGE UP (ref 10–12.9)
RBC # BLD: 2.7 M/UL — LOW (ref 4.2–5.8)
RBC # BLD: 2.83 M/UL — LOW (ref 4.2–5.8)
RBC # FLD: 12.2 % — SIGNIFICANT CHANGE UP (ref 10.3–14.5)
RBC # FLD: 12.4 % — SIGNIFICANT CHANGE UP (ref 10.3–14.5)
SARS-COV-2 RNA SPEC QL NAA+PROBE: SIGNIFICANT CHANGE UP
SODIUM SERPL-SCNC: 138 MMOL/L — SIGNIFICANT CHANGE UP (ref 135–145)
SODIUM SERPL-SCNC: 139 MMOL/L — SIGNIFICANT CHANGE UP (ref 135–145)
URATE SERPL-MCNC: 2.1 MG/DL — LOW (ref 3.4–8.8)
URATE SERPL-MCNC: 3 MG/DL — LOW (ref 3.4–8.8)
WBC # BLD: 0.46 K/UL — CRITICAL LOW (ref 3.8–10.5)
WBC # BLD: 0.65 K/UL — CRITICAL LOW (ref 3.8–10.5)
WBC # FLD AUTO: 0.46 K/UL — CRITICAL LOW (ref 3.8–10.5)
WBC # FLD AUTO: 0.65 K/UL — CRITICAL LOW (ref 3.8–10.5)

## 2020-04-29 PROCEDURE — 99232 SBSQ HOSP IP/OBS MODERATE 35: CPT | Mod: GC

## 2020-04-29 RX ORDER — CASPOFUNGIN ACETATE 7 MG/ML
70 INJECTION, POWDER, LYOPHILIZED, FOR SOLUTION INTRAVENOUS ONCE
Refills: 0 | Status: COMPLETED | OUTPATIENT
Start: 2020-04-29 | End: 2020-04-29

## 2020-04-29 RX ORDER — CASPOFUNGIN ACETATE 7 MG/ML
INJECTION, POWDER, LYOPHILIZED, FOR SOLUTION INTRAVENOUS
Refills: 0 | Status: DISCONTINUED | OUTPATIENT
Start: 2020-04-29 | End: 2020-05-20

## 2020-04-29 RX ORDER — CASPOFUNGIN ACETATE 7 MG/ML
50 INJECTION, POWDER, LYOPHILIZED, FOR SOLUTION INTRAVENOUS EVERY 24 HOURS
Refills: 0 | Status: DISCONTINUED | OUTPATIENT
Start: 2020-04-30 | End: 2020-05-20

## 2020-04-29 RX ADMIN — CASPOFUNGIN ACETATE 260 MILLIGRAM(S): 7 INJECTION, POWDER, LYOPHILIZED, FOR SOLUTION INTRAVENOUS at 12:24

## 2020-04-29 RX ADMIN — ATOVAQUONE 750 MILLIGRAM(S): 750 SUSPENSION ORAL at 17:03

## 2020-04-29 RX ADMIN — Medication 400 MILLIGRAM(S): at 21:50

## 2020-04-29 RX ADMIN — ATOVAQUONE 750 MILLIGRAM(S): 750 SUSPENSION ORAL at 05:43

## 2020-04-29 RX ADMIN — SODIUM CHLORIDE 75 MILLILITER(S): 9 INJECTION INTRAMUSCULAR; INTRAVENOUS; SUBCUTANEOUS at 05:43

## 2020-04-29 RX ADMIN — Medication 30 MILLILITER(S): at 06:42

## 2020-04-29 RX ADMIN — Medication 30 MILLILITER(S): at 21:50

## 2020-04-29 RX ADMIN — ELTROMBOPAG OLAMINE 150 MILLIGRAM(S): 50 TABLET, FILM COATED ORAL at 12:23

## 2020-04-29 RX ADMIN — Medication 100 MILLIGRAM(S): at 05:43

## 2020-04-29 RX ADMIN — FAMOTIDINE 20 MILLIGRAM(S): 10 INJECTION INTRAVENOUS at 12:23

## 2020-04-29 RX ADMIN — Medication 400 MILLIGRAM(S): at 05:43

## 2020-04-29 RX ADMIN — Medication 5 MILLILITER(S): at 08:42

## 2020-04-29 RX ADMIN — Medication 400 MILLIGRAM(S): at 13:26

## 2020-04-29 RX ADMIN — CYCLOSPORINE 485 MILLIGRAM(S): 100 CAPSULE ORAL at 17:05

## 2020-04-29 RX ADMIN — Medication 30 MILLILITER(S): at 17:05

## 2020-04-29 RX ADMIN — Medication 5 MILLILITER(S): at 12:25

## 2020-04-29 RX ADMIN — CYCLOSPORINE 485 MILLIGRAM(S): 100 CAPSULE ORAL at 05:43

## 2020-04-29 RX ADMIN — Medication 5 MILLILITER(S): at 21:50

## 2020-04-29 NOTE — PROGRESS NOTE ADULT - PROBLEM SELECTOR PLAN 1
4/20 BM Bx consistent with aplastic anemia  started h-ATG, Cyclosporine, Prednisone, Eltrombopag 4/23  s/p Horse ATG 40mg/kg/day = 3880 mg daily x 4 days (4/23- 4/26)  Cyclosporine 10mg/kg/day divided BID = 485mg BID  Eltrombopag 150mg daily  Follow up CBC, CMP daily  Keep PLTs > 50k for retinal hemorrhage  Strict I&Os, daily weights, mouth care  s/p reaction from ATG on 4/23 (hives)  s/p solumedrol 100mg IV q8 for the 4 days with ATG.   Contniue prednisone for days 5-10  (4/27-5/2) and then order taper.  abdominal discomfort - pepcid started, symptoms improved with Maalox  Hyperbilirubinemia 2.3 - will check Direct Bili  Mild Grade 1  ALT Transaminitis will follow repeat levels 4/20 BM Bx consistent with aplastic anemia  started h-ATG, Cyclosporine, Prednisone, Eltrombopag 4/23  s/p Horse ATG 40mg/kg/day = 3880 mg daily x 4 days (4/23- 4/26)  Cyclosporine 10mg/kg/day divided BID = 485mg BID  Eltrombopag 150mg daily  Follow up CBC, CMP daily  Keep PLTs > 50k for retinal hemorrhage  Strict I&Os, daily weights, mouth care  s/p reaction from ATG on 4/23 (hives)  s/p solumedrol 100mg IV q8 for the 4 days with ATG.   Contniue prednisone for days 5-10  (4/27-5/2) and then order taper.  abdominal discomfort - pepcid started, symptoms improved with Maalox  Hyperbilirubinemia 2.3 - improved today 1.5  Grade 3  ALT Transaminitis will discontinue posaconazole, if still rising will consider holding promacta

## 2020-04-29 NOTE — PROGRESS NOTE ADULT - SUBJECTIVE AND OBJECTIVE BOX
Diagnosis: Aplastic anemia    Protocol/Chemo Regimen: ATG/Cyclosporine/Eltrombopag (solumedrol until day 4 then prednisone day 5-10)     Day: 7    Pt endorsed: abdominal discomfort improved with Maalox    Review of Systems: denies nausea, vomiting, diarrhea, chest pain, SOB    Pain scale: 0    Diet: regular    Allergies: No Known Allergies      ANTIMICROBIALS  acyclovir   Oral Tab/Cap 400 milliGRAM(s) Oral every 8 hours  atovaquone Suspension 750 milliGRAM(s) Oral every 12 hours  levoFLOXacin  Tablet 500 milliGRAM(s) Oral every 24 hours  posaconazole DR Tablet 300 milliGRAM(s) Oral daily      HEME/ONC MEDICATIONS  eltrombopag 150 milliGRAM(s) Oral daily      STANDING MEDICATIONS  antithymocyte globulin equine Skin Test 0.1 milliLiter(s) IntraDermal once  Biotene Dry Mouth Oral Rinse 5 milliLiter(s) Swish and Spit five times a day  cycloSPORINE  (SandIMMUNE)  Solution 485 milliGRAM(s) Oral every 12 hours  famotidine    Tablet 20 milliGRAM(s) Oral daily  predniSONE   Tablet 100 milliGRAM(s) Oral daily  sodium chloride 0.9%. 1000 milliLiter(s) IV Continuous <Continuous>      PRN MEDICATIONS  acetaminophen   Tablet .. 650 milliGRAM(s) Oral every 6 hours PRN  acetaminophen   Tablet .. 650 milliGRAM(s) Oral once PRN  aluminum hydroxide/magnesium hydroxide/simethicone Suspension 30 milliLiter(s) Oral every 4 hours PRN  EPINEPHrine     1 mG/mL Injectable 0.3 milliGRAM(s) IntraMuscular once PRN  methylPREDNISolone sodium succinate IVPB 500 milliGRAM(s) IV Intermittent once PRN  ondansetron Injectable 4 milliGRAM(s) IV Push every 6 hours PRN      Vital Signs Last 24 Hrs  T(C): 36.1 (29 Apr 2020 05:57), Max: 36.7 (28 Apr 2020 13:40)  T(F): 96.9 (29 Apr 2020 05:57), Max: 98 (28 Apr 2020 13:40)  HR: 62 (29 Apr 2020 05:57) (62 - 85)  BP: 121/77 (29 Apr 2020 05:57) (111/70 - 143/77)  RR: 18 (29 Apr 2020 05:57) (17 - 18)  SpO2: 98% (29 Apr 2020 05:57) (97% - 98%)    PHYSICAL EXAM  General: NAD  HEENT: clear oropharynx, anicteric sclera, pink conjunctiva  Neck: supple  CV: (+) S1/S2 RRR  Lungs: positive air movement b/l ant lungs, clear to auscultation, no wheezes, no rales  Abdomen: soft, non-tender, non-distended  Ext: no clubbing cyanosis or edema  Skin: no rashes and no petechiae  Neuro: alert and oriented X 3, no focal deficits  Central Line: PIV      LABS:    Cultures:   Culture - Stool (04.26.20 @ 04:51)    Specimen Source: .Stool Feces    Culture Results:   No enteric pathogens to date: Final culture pending  No enteric gram negative rods isolated      Culture - Urine (09.24.15 @ 19:14)    Specimen Source: .Urine Clean Catch (Midstream)    Culture Results:   No growth                                       8.1    x     )-----------( 42       ( 29 Apr 2020 07:23 )             22.5         Mean Cell Volume : 83.3 fl  Mean Cell Hemoglobin : 30.0 pg  Mean Cell Hemoglobin Concentration : 36.0 gm/dL  Auto Neutrophil # : x  Auto Lymphocyte # : x  Auto Monocyte # : x  Auto Eosinophil # : x  Auto Basophil # : x  Auto Neutrophil % : x  Auto Lymphocyte % : x  Auto Monocyte % : x  Auto Eosinophil % : x  Auto Basophil % : x            RADIOLOGY & ADDITIONAL STUDIES:    from: CT Head No Cont (04.17.20 @ 16:11) >  IMPRESSION:   1.  No acute intracranial hemorrhage or mass effect.   2.  Paranasal sinusitis. Diagnosis: Aplastic anemia    Protocol/Chemo Regimen: ATG/Cyclosporine/Eltrombopag (solumedrol until day 4 then prednisone day 5-10)     Day: 7    Pt endorsed: abdominal discomfort improved with Maalox    Review of Systems: denies nausea, vomiting, diarrhea, chest pain, SOB    Pain scale: 0    Diet: regular    Allergies: No Known Allergies      ANTIMICROBIALS  acyclovir   Oral Tab/Cap 400 milliGRAM(s) Oral every 8 hours  atovaquone Suspension 750 milliGRAM(s) Oral every 12 hours  levoFLOXacin  Tablet 500 milliGRAM(s) Oral every 24 hours  posaconazole DR Tablet 300 milliGRAM(s) Oral daily      HEME/ONC MEDICATIONS  eltrombopag 150 milliGRAM(s) Oral daily      STANDING MEDICATIONS  antithymocyte globulin equine Skin Test 0.1 milliLiter(s) IntraDermal once  Biotene Dry Mouth Oral Rinse 5 milliLiter(s) Swish and Spit five times a day  cycloSPORINE  (SandIMMUNE)  Solution 485 milliGRAM(s) Oral every 12 hours  famotidine    Tablet 20 milliGRAM(s) Oral daily  predniSONE   Tablet 100 milliGRAM(s) Oral daily  sodium chloride 0.9%. 1000 milliLiter(s) IV Continuous <Continuous>      PRN MEDICATIONS  acetaminophen   Tablet .. 650 milliGRAM(s) Oral every 6 hours PRN  acetaminophen   Tablet .. 650 milliGRAM(s) Oral once PRN  aluminum hydroxide/magnesium hydroxide/simethicone Suspension 30 milliLiter(s) Oral every 4 hours PRN  EPINEPHrine     1 mG/mL Injectable 0.3 milliGRAM(s) IntraMuscular once PRN  methylPREDNISolone sodium succinate IVPB 500 milliGRAM(s) IV Intermittent once PRN  ondansetron Injectable 4 milliGRAM(s) IV Push every 6 hours PRN      Vital Signs Last 24 Hrs  T(C): 36.1 (29 Apr 2020 05:57), Max: 36.7 (28 Apr 2020 13:40)  T(F): 96.9 (29 Apr 2020 05:57), Max: 98 (28 Apr 2020 13:40)  HR: 62 (29 Apr 2020 05:57) (62 - 85)  BP: 121/77 (29 Apr 2020 05:57) (111/70 - 143/77)  RR: 18 (29 Apr 2020 05:57) (17 - 18)  SpO2: 98% (29 Apr 2020 05:57) (97% - 98%)    PHYSICAL EXAM  General: NAD  HEENT: clear oropharynx, anicteric sclera, pink conjunctiva  Neck: supple  CV: (+) S1/S2 RRR  Lungs: positive air movement b/l ant lungs, clear to auscultation, no wheezes, no rales  Abdomen: soft, non-tender, non-distended  Ext: no clubbing cyanosis or edema  Skin: no rashes and no petechiae  Neuro: alert and oriented X 3, no focal deficits  Central Line: PIV      LABS:    Cultures:   Culture - Stool (04.26.20 @ 04:51)    Specimen Source: .Stool Feces    Culture Results:   No enteric pathogens to date: Final culture pending  No enteric gram negative rods isolated      Culture - Urine (09.24.15 @ 19:14)    Specimen Source: .Urine Clean Catch (Midstream)    Culture Results:   No growth                                    8.1    0.65   )-----------( 42       ( 29 Apr 2020 07:23 )               22.5       Mean Cell Volume : 83.3 fl  Mean Cell Hemoglobin : 30.0 pg  Mean Cell Hemoglobin Concentration : 36.0 gm/dL  Auto Neutrophil # : x  Auto Lymphocyte # : x  Auto Monocyte # : x  Auto Eosinophil # : x  Auto Basophil # : x  Auto Neutrophil % : x  Auto Lymphocyte % : x  Auto Monocyte % : x  Auto Eosinophil % : x  Auto Basophil % : x      29 Apr 2020 07:23    139    |  104    |  28     ----------------------------<  98     3.5     |  24     |  0.85     Ca    8.2        29 Apr 2020 07:23  Phos  2.6       29 Apr 2020 07:23  Mg     2.0       29 Apr 2020 07:23    TPro  5.9    /  Alb  3.2    /  TBili  1.5    /  DBili  0.4    /  AST  71     /  ALT  241    /  AlkPhos  61     29 Apr 2020 07:23    PT/INR - ( 29 Apr 2020 07:23 )   PT: 11.8 sec;   INR: 1.03 ratio    PTT - ( 29 Apr 2020 07:23 )  PTT:22.6 sec      LIVER FUNCTIONS - ( 29 Apr 2020 07:23 )  Alb: 3.2 g/dL / Pro: 5.9 g/dL / ALK PHOS: 61 U/L / ALT: 241 U/L / AST: 71 U/L / GGT: x             RADIOLOGY & ADDITIONAL STUDIES:    from: CT Head No Cont (04.17.20 @ 16:11) >  IMPRESSION:   1.  No acute intracranial hemorrhage or mass effect.   2.  Paranasal sinusitis. Diagnosis: Aplastic anemia    Protocol/Chemo Regimen: ATG/Cyclosporine/Eltrombopag (solumedrol until day 4 then prednisone day 5-10)     Day: 7    Pt endorsed: abdominal discomfort improved with Maalox    Review of Systems: denies nausea, vomiting, diarrhea, chest pain, SOB    Pain scale: 0    Diet: regular    Allergies: No Known Allergies      ANTIMICROBIALS  acyclovir   Oral Tab/Cap 400 milliGRAM(s) Oral every 8 hours  atovaquone Suspension 750 milliGRAM(s) Oral every 12 hours  levoFLOXacin  Tablet 500 milliGRAM(s) Oral every 24 hours  posaconazole DR Tablet 300 milliGRAM(s) Oral daily      HEME/ONC MEDICATIONS  eltrombopag 150 milliGRAM(s) Oral daily      STANDING MEDICATIONS  antithymocyte globulin equine Skin Test 0.1 milliLiter(s) IntraDermal once  Biotene Dry Mouth Oral Rinse 5 milliLiter(s) Swish and Spit five times a day  cycloSPORINE  (SandIMMUNE)  Solution 485 milliGRAM(s) Oral every 12 hours  famotidine    Tablet 20 milliGRAM(s) Oral daily  predniSONE   Tablet 100 milliGRAM(s) Oral daily  sodium chloride 0.9%. 1000 milliLiter(s) IV Continuous <Continuous>      PRN MEDICATIONS  acetaminophen   Tablet .. 650 milliGRAM(s) Oral every 6 hours PRN  acetaminophen   Tablet .. 650 milliGRAM(s) Oral once PRN  aluminum hydroxide/magnesium hydroxide/simethicone Suspension 30 milliLiter(s) Oral every 4 hours PRN  EPINEPHrine     1 mG/mL Injectable 0.3 milliGRAM(s) IntraMuscular once PRN  methylPREDNISolone sodium succinate IVPB 500 milliGRAM(s) IV Intermittent once PRN  ondansetron Injectable 4 milliGRAM(s) IV Push every 6 hours PRN      Vital Signs Last 24 Hrs  T(C): 36.1 (29 Apr 2020 05:57), Max: 36.7 (28 Apr 2020 13:40)  T(F): 96.9 (29 Apr 2020 05:57), Max: 98 (28 Apr 2020 13:40)  HR: 62 (29 Apr 2020 05:57) (62 - 85)  BP: 121/77 (29 Apr 2020 05:57) (111/70 - 143/77)  RR: 18 (29 Apr 2020 05:57) (17 - 18)  SpO2: 98% (29 Apr 2020 05:57) (97% - 98%)    PHYSICAL EXAM  General: NAD  HEENT: clear oropharynx, anicteric sclera, pink conjunctiva  Neck: supple  CV: (+) S1/S2 RRR  Lungs: positive air movement b/l ant lungs, clear to auscultation, no wheezes, no rales  Abdomen: soft, non-tender, non-distended  Ext: no clubbing cyanosis or edema  Skin: no rashes and no petechiae  Neuro: alert and oriented X 3, no focal deficits  Central Line: PIV    Cultures:     Culture - Stool (04.26.20 @ 04:51)    Specimen Source: .Stool Feces    Culture Results:   No enteric pathogens to date: Final culture pending  No enteric gram negative rods isolated      Culture - Urine (09.24.15 @ 19:14)    Specimen Source: .Urine Clean Catch (Midstream)    Culture Results:   No growth    LABS:                                  8.1    0.65   )-----------( 42       ( 29 Apr 2020 07:23 )               22.5       Mean Cell Volume : 83.3 fl  Mean Cell Hemoglobin : 30.0 pg  Mean Cell Hemoglobin Concentration : 36.0 gm/dL  Auto Neutrophil # : x  Auto Lymphocyte # : x  Auto Monocyte # : x  Auto Eosinophil # : x  Auto Basophil # : x  Auto Neutrophil % : x  Auto Lymphocyte % : x  Auto Monocyte % : x  Auto Eosinophil % : x  Auto Basophil % : x      29 Apr 2020 07:23    139    |  104    |  28     ----------------------------<  98     3.5     |  24     |  0.85     Ca    8.2        29 Apr 2020 07:23  Phos  2.6       29 Apr 2020 07:23  Mg     2.0       29 Apr 2020 07:23    TPro  5.9    /  Alb  3.2    /  TBili  1.5    /  DBili  0.4    /  AST  71     /  ALT  241    /  AlkPhos  61     29 Apr 2020 07:23    PT/INR - ( 29 Apr 2020 07:23 )   PT: 11.8 sec;   INR: 1.03 ratio    PTT - ( 29 Apr 2020 07:23 )  PTT:22.6 sec      LIVER FUNCTIONS - ( 29 Apr 2020 07:23 )  Alb: 3.2 g/dL / Pro: 5.9 g/dL / ALK PHOS: 61 U/L / ALT: 241 U/L / AST: 71 U/L / GGT: x             RADIOLOGY & ADDITIONAL STUDIES:    from: CT Head No Cont (04.17.20 @ 16:11) >  IMPRESSION:   1.  No acute intracranial hemorrhage or mass effect.   2.  Paranasal sinusitis.

## 2020-04-29 NOTE — PROGRESS NOTE ADULT - PROBLEM SELECTOR PLAN 3
Intraretinal hemorrhage from thrombocytopenia + anemia  Transfuse to keep PLTs above 50k for now  Opthalmology [id number]:  Patient should follow up his/her ophthalmologist or in the [facility] within one week of discharge:    [address]  [place], [place] [id number] [phone]

## 2020-04-29 NOTE — PROGRESS NOTE ADULT - PROBLEM SELECTOR PLAN 2
Patient is neutropenic, afebrile  Continue Levaquin, posaconazole, Mepron, Acyclovir for prophylaxis  If febrile, panculture and change Levaquin to Cefepime  COVID-19 surveillance 4/22 (-) negative Patient is neutropenic, afebrile  Continue Levaquin, Mepron, Acyclovir for prophylaxis  discontinued Posaconazole due to Grade 3 transaminitis, changed to Caspofungin  If febrile, panculture and change Levaquin to Cefepime  COVID-19 surveillance 4/22 (-) negative

## 2020-04-29 NOTE — PROGRESS NOTE ADULT - ASSESSMENT
33 y/o Male with no PMH p/w dizziness in setting of 2 weeks of fevers/chills, melena and easy bruising, found to be pancytopenic with Hb 3.6 and platelets of 2,000.  Patient has been transferred to [facility] from [facility] for further hematologic work-up. Bone Marrow biopsy performed 4/20 revealed Aplastic Anemia. Patient has pancytopenia secondary to disease process. 33 y/o Male with no PMH p/w dizziness in setting of 2 weeks of fevers/chills, melena and easy bruising, found to be pancytopenic with Hb 3.6 and platelets of 2,000.  Patient has been transferred to [facility] from [facility] for further hematologic work-up. Bone Marrow biopsy performed 4/20 revealed Aplastic Anemia. Treatment consisting of equine ATG, Cyclosporine, Prednisone, and Eltrombopag started 4/23. Hospital course complicated by retinal hemorrhage, reaction to ATG of hives treated with IV solumedrol, hyperbilirubinemia, and grade 3 transaminitis likely drug induced. Patient has pancytopenia secondary to disease process.

## 2020-04-29 NOTE — PROGRESS NOTE ADULT - ATTENDING COMMENTS
34M with no PMH presented to OSH with dizziness x 2 weeks, easy bruising x 2-4 weeks. At home had a syncopal event and called 911, found to have hemoglobin 3s, Platelets 2K, no evidence of DIC. Noted mild petechiae and has had gum bleeding. These have improved. Noted dark stools for 2 weeks. Peripheral smear with immature forms concerning for acute leukemia vs. aplastic anemia   -peripheral flow without blast population, FISH pending   -received ATRA 50 mg BID until PML-CHANTELLE negative   -Bone marrow evaluation(4/20)c/w severe aplastic anemia. Treatment plan: hATG, Cyclosporine, Eltrombopag.   - ATG test dose negative. Begin ATG, CSA, Eltrombopag on 4/23  -patient developed hives and short episode of wheezing with ATG infusion, which resolved- will continue Solumedrol pre-med with ATG(hold Prednisone), then resume Prednisone on day 5 to complete recommended course for prevention of serum sickness.  - IVF, mouth care; discontinued Allopurinol  -receiving transfusions if Hgb< 7, Plt <50k   - s/p vit K for elevated INR  -optho consult given hazy vision, suspect retinal bleed given low platelets. Platelet transfusional support for goal >50k.   -monitor for s/s of bleeding, gum bleeding intermittent   -Paranasal sinusitis on CT head(4/17)- cont Levaquin, begin Posaconazole; on Mepron and Acyclovir with immunosuppressive therapy.  - d6 of treatment, cont transfusion support prn 34M with no PMH presented to OSH with dizziness x 2 weeks, easy bruising x 2-4 weeks. At home had a syncopal event and called 911, found to have hemoglobin 3s, Platelets 2K, no evidence of DIC. Noted mild petechiae and has had gum bleeding. These have improved. Noted dark stools for 2 weeks. Peripheral smear with immature forms concerning for acute leukemia vs. aplastic anemia   -peripheral flow without blast population, FISH pending   -received ATRA 50 mg BID until PML-CHANTELLE negative   -Bone marrow evaluation(4/20)c/w severe aplastic anemia. Treatment plan: hATG, Cyclosporine, Eltrombopag.   - ATG test dose negative. Begin ATG, CSA, Eltrombopag on 4/23  -patient developed hives and short episode of wheezing with ATG infusion, which resolved- will continue Solumedrol pre-med with ATG(hold Prednisone), then resume Prednisone on day 5 to complete recommended course for prevention of serum sickness.  - IVF, mouth care; discontinued Allopurinol  -receiving transfusions if Hgb< 7, Plt <50k   - s/p vit K for elevated INR  -optho consult given hazy vision, suspect retinal bleed given low platelets. Platelet transfusional support for goal >50k.   -monitor for s/s of bleeding, gum bleeding intermittent   -Paranasal sinusitis on CT head(4/17)- cont Levaquin, begin Posaconazole; on Mepron and Acyclovir with immunosuppressive therapy.  - d7 of treatment, cont transfusion support prn

## 2020-04-30 DIAGNOSIS — H35.60 RETINAL HEMORRHAGE, UNSPECIFIED EYE: ICD-10-CM

## 2020-04-30 DIAGNOSIS — R74.0 NONSPECIFIC ELEVATION OF LEVELS OF TRANSAMINASE AND LACTIC ACID DEHYDROGENASE [LDH]: ICD-10-CM

## 2020-04-30 DIAGNOSIS — D61.9 APLASTIC ANEMIA, UNSPECIFIED: ICD-10-CM

## 2020-04-30 LAB
ALBUMIN SERPL ELPH-MCNC: 3.2 G/DL — LOW (ref 3.3–5)
ALBUMIN SERPL ELPH-MCNC: 3.7 G/DL — SIGNIFICANT CHANGE UP (ref 3.3–5)
ALP SERPL-CCNC: 64 U/L — SIGNIFICANT CHANGE UP (ref 40–120)
ALP SERPL-CCNC: 83 U/L — SIGNIFICANT CHANGE UP (ref 40–120)
ALT FLD-CCNC: 275 U/L — HIGH (ref 10–45)
ALT FLD-CCNC: 438 U/L — HIGH (ref 10–45)
ANION GAP SERPL CALC-SCNC: 10 MMOL/L — SIGNIFICANT CHANGE UP (ref 5–17)
ANION GAP SERPL CALC-SCNC: 13 MMOL/L — SIGNIFICANT CHANGE UP (ref 5–17)
APTT BLD: 22.4 SEC — LOW (ref 27.5–36.3)
APTT BLD: 22.8 SEC — LOW (ref 27.5–36.3)
AST SERPL-CCNC: 145 U/L — HIGH (ref 10–40)
AST SERPL-CCNC: 73 U/L — HIGH (ref 10–40)
BASOPHILS # BLD AUTO: 0 K/UL — SIGNIFICANT CHANGE UP (ref 0–0.2)
BASOPHILS NFR BLD AUTO: 0 % — SIGNIFICANT CHANGE UP (ref 0–2)
BILIRUB SERPL-MCNC: 1.6 MG/DL — HIGH (ref 0.2–1.2)
BILIRUB SERPL-MCNC: 2.1 MG/DL — HIGH (ref 0.2–1.2)
BUN SERPL-MCNC: 20 MG/DL — SIGNIFICANT CHANGE UP (ref 7–23)
BUN SERPL-MCNC: 21 MG/DL — SIGNIFICANT CHANGE UP (ref 7–23)
CALCIUM SERPL-MCNC: 8.4 MG/DL — SIGNIFICANT CHANGE UP (ref 8.4–10.5)
CALCIUM SERPL-MCNC: 8.7 MG/DL — SIGNIFICANT CHANGE UP (ref 8.4–10.5)
CHLORIDE SERPL-SCNC: 100 MMOL/L — SIGNIFICANT CHANGE UP (ref 96–108)
CHLORIDE SERPL-SCNC: 104 MMOL/L — SIGNIFICANT CHANGE UP (ref 96–108)
CO2 SERPL-SCNC: 23 MMOL/L — SIGNIFICANT CHANGE UP (ref 22–31)
CO2 SERPL-SCNC: 23 MMOL/L — SIGNIFICANT CHANGE UP (ref 22–31)
CREAT SERPL-MCNC: 0.79 MG/DL — SIGNIFICANT CHANGE UP (ref 0.5–1.3)
CREAT SERPL-MCNC: 0.93 MG/DL — SIGNIFICANT CHANGE UP (ref 0.5–1.3)
CYCLOSPORINE SER-MCNC: 95 NG/ML — LOW (ref 150–400)
D DIMER BLD IA.RAPID-MCNC: 310 NG/ML DDU — HIGH
D DIMER BLD IA.RAPID-MCNC: 334 NG/ML DDU — HIGH
EOSINOPHIL # BLD AUTO: 0 K/UL — SIGNIFICANT CHANGE UP (ref 0–0.5)
EOSINOPHIL NFR BLD AUTO: 0 % — SIGNIFICANT CHANGE UP (ref 0–6)
FIBRINOGEN PPP-MCNC: 382 MG/DL — SIGNIFICANT CHANGE UP (ref 350–510)
FIBRINOGEN PPP-MCNC: 443 MG/DL — SIGNIFICANT CHANGE UP (ref 320–500)
GLUCOSE SERPL-MCNC: 126 MG/DL — HIGH (ref 70–99)
GLUCOSE SERPL-MCNC: 97 MG/DL — SIGNIFICANT CHANGE UP (ref 70–99)
HCT VFR BLD CALC: 20.6 % — CRITICAL LOW (ref 39–50)
HCT VFR BLD CALC: 21.6 % — LOW (ref 39–50)
HGB BLD-MCNC: 7.6 G/DL — LOW (ref 13–17)
HGB BLD-MCNC: 7.9 G/DL — LOW (ref 13–17)
INR BLD: 1.06 RATIO — SIGNIFICANT CHANGE UP (ref 0.88–1.16)
INR BLD: 1.1 RATIO — SIGNIFICANT CHANGE UP (ref 0.88–1.16)
LDH SERPL L TO P-CCNC: 177 U/L — SIGNIFICANT CHANGE UP (ref 50–242)
LDH SERPL L TO P-CCNC: 281 U/L — HIGH (ref 50–242)
LYMPHOCYTES # BLD AUTO: 0.64 K/UL — LOW (ref 1–3.3)
LYMPHOCYTES # BLD AUTO: 64 % — HIGH (ref 13–44)
MAGNESIUM SERPL-MCNC: 1.8 MG/DL — SIGNIFICANT CHANGE UP (ref 1.6–2.6)
MAGNESIUM SERPL-MCNC: 1.9 MG/DL — SIGNIFICANT CHANGE UP (ref 1.6–2.6)
MCHC RBC-ENTMCNC: 29.8 PG — SIGNIFICANT CHANGE UP (ref 27–34)
MCHC RBC-ENTMCNC: 30.5 PG — SIGNIFICANT CHANGE UP (ref 27–34)
MCHC RBC-ENTMCNC: 36.6 GM/DL — HIGH (ref 32–36)
MCHC RBC-ENTMCNC: 36.9 GM/DL — HIGH (ref 32–36)
MCV RBC AUTO: 81.5 FL — SIGNIFICANT CHANGE UP (ref 80–100)
MCV RBC AUTO: 82.7 FL — SIGNIFICANT CHANGE UP (ref 80–100)
MONOCYTES # BLD AUTO: 0.16 K/UL — SIGNIFICANT CHANGE UP (ref 0–0.9)
MONOCYTES NFR BLD AUTO: 16 % — HIGH (ref 2–14)
NEUTROPHILS # BLD AUTO: 0.2 K/UL — LOW (ref 1.8–7.4)
NEUTROPHILS NFR BLD AUTO: 20 % — LOW (ref 43–77)
NRBC # BLD: 1 /100 WBCS — HIGH (ref 0–0)
PHOSPHATE SERPL-MCNC: 2.9 MG/DL — SIGNIFICANT CHANGE UP (ref 2.5–4.5)
PHOSPHATE SERPL-MCNC: 3.1 MG/DL — SIGNIFICANT CHANGE UP (ref 2.5–4.5)
PLATELET # BLD AUTO: 59 K/UL — LOW (ref 150–400)
PLATELET # BLD AUTO: 63 K/UL — LOW (ref 150–400)
POTASSIUM SERPL-MCNC: 3.7 MMOL/L — SIGNIFICANT CHANGE UP (ref 3.5–5.3)
POTASSIUM SERPL-MCNC: 4 MMOL/L — SIGNIFICANT CHANGE UP (ref 3.5–5.3)
POTASSIUM SERPL-SCNC: 3.7 MMOL/L — SIGNIFICANT CHANGE UP (ref 3.5–5.3)
POTASSIUM SERPL-SCNC: 4 MMOL/L — SIGNIFICANT CHANGE UP (ref 3.5–5.3)
PROT SERPL-MCNC: 5.9 G/DL — LOW (ref 6–8.3)
PROT SERPL-MCNC: 6.6 G/DL — SIGNIFICANT CHANGE UP (ref 6–8.3)
PROTHROM AB SERPL-ACNC: 12.1 SEC — SIGNIFICANT CHANGE UP (ref 10–12.9)
PROTHROM AB SERPL-ACNC: 12.7 SEC — SIGNIFICANT CHANGE UP (ref 10–12.9)
RBC # BLD: 2.49 M/UL — LOW (ref 4.2–5.8)
RBC # BLD: 2.65 M/UL — LOW (ref 4.2–5.8)
RBC # FLD: 12.2 % — SIGNIFICANT CHANGE UP (ref 10.3–14.5)
RBC # FLD: 12.2 % — SIGNIFICANT CHANGE UP (ref 10.3–14.5)
SODIUM SERPL-SCNC: 136 MMOL/L — SIGNIFICANT CHANGE UP (ref 135–145)
SODIUM SERPL-SCNC: 137 MMOL/L — SIGNIFICANT CHANGE UP (ref 135–145)
URATE SERPL-MCNC: 2.4 MG/DL — LOW (ref 3.4–8.8)
URATE SERPL-MCNC: 2.5 MG/DL — LOW (ref 3.4–8.8)
WBC # BLD: 0.7 K/UL — CRITICAL LOW (ref 3.8–10.5)
WBC # BLD: 1 K/UL — CRITICAL LOW (ref 3.8–10.5)
WBC # FLD AUTO: 0.7 K/UL — CRITICAL LOW (ref 3.8–10.5)
WBC # FLD AUTO: 1 K/UL — CRITICAL LOW (ref 3.8–10.5)

## 2020-04-30 PROCEDURE — 99232 SBSQ HOSP IP/OBS MODERATE 35: CPT | Mod: GC

## 2020-04-30 RX ORDER — FAMOTIDINE 10 MG/ML
20 INJECTION INTRAVENOUS
Refills: 0 | Status: DISCONTINUED | OUTPATIENT
Start: 2020-04-30 | End: 2020-05-20

## 2020-04-30 RX ORDER — CYCLOSPORINE 100 MG/1
600 CAPSULE ORAL EVERY 12 HOURS
Refills: 0 | Status: DISCONTINUED | OUTPATIENT
Start: 2020-04-30 | End: 2020-05-13

## 2020-04-30 RX ADMIN — ELTROMBOPAG OLAMINE 150 MILLIGRAM(S): 50 TABLET, FILM COATED ORAL at 12:15

## 2020-04-30 RX ADMIN — Medication 5 MILLILITER(S): at 15:01

## 2020-04-30 RX ADMIN — Medication 30 MILLILITER(S): at 17:43

## 2020-04-30 RX ADMIN — Medication 30 MILLILITER(S): at 22:17

## 2020-04-30 RX ADMIN — Medication 100 MILLIGRAM(S): at 06:35

## 2020-04-30 RX ADMIN — Medication 30 MILLILITER(S): at 06:35

## 2020-04-30 RX ADMIN — ATOVAQUONE 750 MILLIGRAM(S): 750 SUSPENSION ORAL at 17:43

## 2020-04-30 RX ADMIN — Medication 400 MILLIGRAM(S): at 06:35

## 2020-04-30 RX ADMIN — Medication 400 MILLIGRAM(S): at 22:17

## 2020-04-30 RX ADMIN — CYCLOSPORINE 485 MILLIGRAM(S): 100 CAPSULE ORAL at 06:35

## 2020-04-30 RX ADMIN — Medication 5 MILLILITER(S): at 12:15

## 2020-04-30 RX ADMIN — FAMOTIDINE 20 MILLIGRAM(S): 10 INJECTION INTRAVENOUS at 17:43

## 2020-04-30 RX ADMIN — ATOVAQUONE 750 MILLIGRAM(S): 750 SUSPENSION ORAL at 06:35

## 2020-04-30 RX ADMIN — CASPOFUNGIN ACETATE 260 MILLIGRAM(S): 7 INJECTION, POWDER, LYOPHILIZED, FOR SOLUTION INTRAVENOUS at 08:22

## 2020-04-30 RX ADMIN — Medication 400 MILLIGRAM(S): at 15:01

## 2020-04-30 RX ADMIN — Medication 5 MILLILITER(S): at 22:17

## 2020-04-30 RX ADMIN — SODIUM CHLORIDE 75 MILLILITER(S): 9 INJECTION INTRAMUSCULAR; INTRAVENOUS; SUBCUTANEOUS at 06:36

## 2020-04-30 RX ADMIN — CYCLOSPORINE 600 MILLIGRAM(S): 100 CAPSULE ORAL at 17:43

## 2020-04-30 NOTE — PROGRESS NOTE ADULT - PROBLEM SELECTOR PLAN 3
Intraretinal hemorrhage from thrombocytopenia + anemia  Transfuse to keep PLTs above 50k for now  Opthalmology [id number]:  Patient should follow up his/her ophthalmologist or in the [facility] Practice within one week of discharge:    [address]  [place], [place] [id number] [phone] Grade 3 transaminitis with hyperbilirubinemia hopefully plateauing  Will consider holding Promacta Grade 3 transaminitis with hyperbilirubinemia  Bilirubin improving and transaminitis appears to be plateauing  Will consider holding Promacta if any further increase in ALT

## 2020-04-30 NOTE — PROGRESS NOTE ADULT - PROBLEM SELECTOR PLAN 1
4/20 BM Bx consistent with aplastic anemia  started h-ATG, Cyclosporine, Prednisone, Eltrombopag 4/23  s/p Horse ATG 40mg/kg/day = 3880 mg daily x 4 days (4/23- 4/26)  Cyclosporine 10mg/kg/day divided BID = 485mg BID  Eltrombopag 150mg daily  Follow up CBC, CMP daily  Keep PLTs > 50k for retinal hemorrhage  Strict I&Os, daily weights, mouth care  s/p reaction from ATG on 4/23 (hives)  s/p solumedrol 100mg IV q8 for the 4 days with ATG.   Contniue prednisone for days 5-10  (4/27-5/2) and then order taper.  abdominal discomfort - pepcid started, symptoms improved with Maalox  Hyperbilirubinemia 2.3 - improved today 1.5  Grade 3  ALT Transaminitis will discontinue posaconazole, if still rising will consider holding promacta Most likely due to Sepsis. Resolved after IVF resuscitation .  Continue to monitor BP  fall precautions. 4/20 BM Bx consistent with Aplastic Anemia  Status post Horse ATG 40mg/kg/day daily x 4 days (4/23- 4/26)  Continue CSA 10mg/kg/day divided BID, Eltrombopag 150mg daily and Prednisone (Days 5-10)  Monitor CBC/Lytes and transfuse/replete PRN  Keep PLT> 50k for retinal hemorrhage  Strict Is and Os/Daily weights/Mouth Care  IVF 4/20 BM Bx consistent with Aplastic Anemia  Status post Horse ATG 40mg/kg/day daily x 4 days (4/23- 4/26)  Continue Prednisone (taper to continue through 5/17), CSA and Eltrombopag  CSA level today 95. Dose increased to 600mh PO q 12 and repeat level due on 5/3  Monitor CBC/Lytes and transfuse/replete PRN  Keep PLT> 50k for retinal hemorrhage  Strict Is and Os/Daily weights/Mouth Care  IVF

## 2020-04-30 NOTE — PROGRESS NOTE ADULT - PROBLEM SELECTOR PLAN 2
Patient is neutropenic, afebrile  Continue Levaquin, Mepron, Acyclovir for prophylaxis  discontinued Posaconazole due to Grade 3 transaminitis, changed to Caspofungin  If febrile, panculture and change Levaquin to Cefepime  COVID-19 surveillance 4/22 (-) negative The patient is neutropenic, afebrile  If ferbile Pan Cx, CXR and change Levaquin to Cefepime  Continue Levaquin, Mepron, Caspofungin and Acyclovir for prophylaxis  COVID-19 surveillance (-) x 2

## 2020-04-30 NOTE — PROGRESS NOTE ADULT - SUBJECTIVE AND OBJECTIVE BOX
Diagnosis:    Protocol/Chemo Regimen:    Day:     Pt endorsed:    Review of Systems:     Pain scale:     Diet:     Allergies    No Known Allergies    Intolerances        ANTIMICROBIALS  acyclovir   Oral Tab/Cap 400 milliGRAM(s) Oral every 8 hours  atovaquone Suspension 750 milliGRAM(s) Oral every 12 hours  caspofungin IVPB      caspofungin IVPB 50 milliGRAM(s) IV Intermittent every 24 hours  levoFLOXacin  Tablet 500 milliGRAM(s) Oral every 24 hours      HEME/ONC MEDICATIONS  eltrombopag 150 milliGRAM(s) Oral daily      STANDING MEDICATIONS  antithymocyte globulin equine Skin Test 0.1 milliLiter(s) IntraDermal once  Biotene Dry Mouth Oral Rinse 5 milliLiter(s) Swish and Spit five times a day  cycloSPORINE  (SandIMMUNE)  Solution 485 milliGRAM(s) Oral every 12 hours  famotidine    Tablet 20 milliGRAM(s) Oral daily  predniSONE   Tablet 100 milliGRAM(s) Oral daily  sodium chloride 0.9%. 1000 milliLiter(s) IV Continuous <Continuous>      PRN MEDICATIONS  acetaminophen   Tablet .. 650 milliGRAM(s) Oral every 6 hours PRN  acetaminophen   Tablet .. 650 milliGRAM(s) Oral once PRN  aluminum hydroxide/magnesium hydroxide/simethicone Suspension 30 milliLiter(s) Oral every 4 hours PRN  EPINEPHrine     1 mG/mL Injectable 0.3 milliGRAM(s) IntraMuscular once PRN  methylPREDNISolone sodium succinate IVPB 500 milliGRAM(s) IV Intermittent once PRN  ondansetron Injectable 4 milliGRAM(s) IV Push every 6 hours PRN        Vital Signs Last 24 Hrs  T(C): 36.1 (30 Apr 2020 05:15), Max: 36.2 (29 Apr 2020 10:27)  T(F): 96.9 (30 Apr 2020 05:15), Max: 97.2 (29 Apr 2020 10:27)  HR: 54 (30 Apr 2020 05:15) (54 - 99)  BP: 137/80 (30 Apr 2020 05:15) (125/78 - 152/79)  BP(mean): --  RR: 18 (30 Apr 2020 05:15) (18 - 18)  SpO2: 98% (30 Apr 2020 05:15) (98% - 99%)    PHYSICAL EXAM  General: NAD  HEENT: PERRLA, EOMI, clear oropharynx  Neck: supple  CV: (+) S1/S2 RRR  Lungs: clear to auscultation, no wheezes or rales  Abdomen: soft, non-tender, non-distended (+) BS  Ext: no edema  Skin: no rashes   Neuro: alert and oriented X 3, no focal deficits  Central Line:     RECENT CULTURES:  04-26 @ 04:51  .Stool Feces  --  --  --    No enteric pathogens isolated.  (Stool culture examined for Salmonella,  Shigella, Campylobacter, Aeromonas, Plesiomonas,  Vibrio, E.coli O157 and Yersinia)  --        LABS:                        8.5    0.46  )-----------( 84       ( 29 Apr 2020 16:33 )             23.3         Mean Cell Volume : 82.3 fl  Mean Cell Hemoglobin : 30.0 pg  Mean Cell Hemoglobin Concentration : 36.5 gm/dL  Auto Neutrophil # : x  Auto Lymphocyte # : x  Auto Monocyte # : x  Auto Eosinophil # : x  Auto Basophil # : x  Auto Neutrophil % : x  Auto Lymphocyte % : x  Auto Monocyte % : x  Auto Eosinophil % : x  Auto Basophil % : x      04-30    137  |  104  |  21  ----------------------------<  97  3.7   |  23  |  0.79    Ca    8.4      30 Apr 2020 07:17  Phos  2.9     04-30  Mg     1.9     04-30    TPro  5.9<L>  /  Alb  3.2<L>  /  TBili  1.6<H>  /  DBili  x   /  AST  73<H>  /  ALT  275<H>  /  AlkPhos  64  04-30      Mg 1.9  Phos 2.9  Mg 2.0  Phos 3.0      PT/INR - ( 30 Apr 2020 07:17 )   PT: 12.1 sec;   INR: 1.06 ratio         PTT - ( 29 Apr 2020 16:33 )  PTT:23.0 sec      Uric Acid 2.5      Uric Acid 2.1        RADIOLOGY & ADDITIONAL STUDIES: Diagnosis: Aplastic anemia    Protocol/Chemo Regimen: ATG/Cyclosporine/Eltrombopag/Prednisone (Previously received Solumedrol Days 1-4)    Day: 8    Pt endorsed: no complaints    Review of Systems: Patient denies headache, dizziness, visual changes, chest pain, palpitations, SOB, abdominal pain, nausea, vomiting, diarrhea or dysuria.    Pain scale: 0    Diet: Regular    Allergies: No Known Allergies      ANTIMICROBIALS  acyclovir   Oral Tab/Cap 400 milliGRAM(s) Oral every 8 hours  atovaquone Suspension 750 milliGRAM(s) Oral every 12 hours   caspofungin IVPB 50 milliGRAM(s) IV Intermittent every 24 hours  levoFLOXacin  Tablet 500 milliGRAM(s) Oral every 24 hours      HEME/ONC MEDICATIONS  eltrombopag 150 milliGRAM(s) Oral daily      STANDING MEDICATIONS  antithymocyte globulin equine Skin Test 0.1 milliLiter(s) IntraDermal once  Biotene Dry Mouth Oral Rinse 5 milliLiter(s) Swish and Spit five times a day  cycloSPORINE  (SandIMMUNE)  Solution 485 milliGRAM(s) Oral every 12 hours  famotidine    Tablet 20 milliGRAM(s) Oral daily  predniSONE   Tablet 100 milliGRAM(s) Oral daily  sodium chloride 0.9%. 1000 milliLiter(s) IV Continuous <Continuous>      PRN MEDICATIONS  acetaminophen   Tablet .. 650 milliGRAM(s) Oral every 6 hours PRN  acetaminophen   Tablet .. 650 milliGRAM(s) Oral once PRN  aluminum hydroxide/magnesium hydroxide/simethicone Suspension 30 milliLiter(s) Oral every 4 hours PRN  EPINEPHrine     1 mG/mL Injectable 0.3 milliGRAM(s) IntraMuscular once PRN  methylPREDNISolone sodium succinate IVPB 500 milliGRAM(s) IV Intermittent once PRN  ondansetron Injectable 4 milliGRAM(s) IV Push every 6 hours PRN      Vital Signs Last 24 Hrs  T(C): 36.1 (30 Apr 2020 05:15), Max: 36.2 (29 Apr 2020 10:27)  T(F): 96.9 (30 Apr 2020 05:15), Max: 97.2 (29 Apr 2020 10:27)  HR: 54 (30 Apr 2020 05:15) (54 - 99)  BP: 137/80 (30 Apr 2020 05:15) (125/78 - 152/79)  BP(mean): --  RR: 18 (30 Apr 2020 05:15) (18 - 18)  SpO2: 98% (30 Apr 2020 05:15) (98% - 99%)      PHYSICAL EXAM  General: NAD  HEENT: PERRLA, EOMI, clear oropharynx  CV: (+) S1/S2 RRR  Lungs: clear to auscultation, no wheezes or rales  Abdomen: soft, non-tender, non-distended (+) BS  Ext: no edema  Skin: no rashes   Neuro: alert and oriented X 3, no focal deficits  PIV: C/D/I           LABS:                        7.6    1.00  )-----------( 63       ( 30 Apr 2020 07:17 )             20.6         Mean Cell Volume : 82.7 fl  Mean Cell Hemoglobin : 30.5 pg  Mean Cell Hemoglobin Concentration : 36.9 gm/dL  Auto Neutrophil # : 0.20 K/uL  Auto Lymphocyte # : 0.64 K/uL  Auto Monocyte # : 0.16 K/uL  Auto Eosinophil # : 0.00 K/uL  Auto Basophil # : 0.00 K/uL  Auto Neutrophil % : 20.0 %  Auto Lymphocyte % : 64.0 %  Auto Monocyte % : 16.0 %  Auto Eosinophil % : 0.0 %  Auto Basophil % : 0.0 %      04-30    137  |  104  |  21  ----------------------------<  97  3.7   |  23  |  0.79    Ca    8.4      30 Apr 2020 07:17  Phos  2.9     04-30  Mg     1.9     04-30    TPro  5.9<L>  /  Alb  3.2<L>  /  TBili  1.6<H>  /  DBili  x   /  AST  73<H>  /  ALT  275<H>  /  AlkPhos  64  04-30      Mg 1.9  Phos 2.9      PT/INR - ( 30 Apr 2020 07:17 )   PT: 12.1 sec;   INR: 1.06 ratio         PTT - ( 30 Apr 2020 07:17 )  PTT:22.8 sec      Uric Acid 2.5 Diagnosis: Aplastic anemia    Protocol/Chemo Regimen: ATG/Cyclosporine/Eltrombopag/Prednisone (Previously received Solumedrol Days 1-4)    Day: 8    Pt endorsed: indigestion relieved with Maalox    Review of Systems: Patient denies headache, dizziness, visual changes, chest pain, palpitations, SOB, abdominal pain, nausea, vomiting, diarrhea or dysuria.    Pain scale: 0    Diet: Regular    Allergies: No Known Allergies      ANTIMICROBIALS  acyclovir   Oral Tab/Cap 400 milliGRAM(s) Oral every 8 hours  atovaquone Suspension 750 milliGRAM(s) Oral every 12 hours   caspofungin IVPB 50 milliGRAM(s) IV Intermittent every 24 hours  levoFLOXacin  Tablet 500 milliGRAM(s) Oral every 24 hours      HEME/ONC MEDICATIONS  eltrombopag 150 milliGRAM(s) Oral daily      STANDING MEDICATIONS  antithymocyte globulin equine Skin Test 0.1 milliLiter(s) IntraDermal once  Biotene Dry Mouth Oral Rinse 5 milliLiter(s) Swish and Spit five times a day  cycloSPORINE  (SandIMMUNE)  Solution 485 milliGRAM(s) Oral every 12 hours  famotidine    Tablet 20 milliGRAM(s) Oral daily  predniSONE   Tablet 100 milliGRAM(s) Oral daily  sodium chloride 0.9%. 1000 milliLiter(s) IV Continuous <Continuous>      PRN MEDICATIONS  acetaminophen   Tablet .. 650 milliGRAM(s) Oral every 6 hours PRN  acetaminophen   Tablet .. 650 milliGRAM(s) Oral once PRN  aluminum hydroxide/magnesium hydroxide/simethicone Suspension 30 milliLiter(s) Oral every 4 hours PRN  EPINEPHrine     1 mG/mL Injectable 0.3 milliGRAM(s) IntraMuscular once PRN  methylPREDNISolone sodium succinate IVPB 500 milliGRAM(s) IV Intermittent once PRN  ondansetron Injectable 4 milliGRAM(s) IV Push every 6 hours PRN      Vital Signs Last 24 Hrs  T(C): 36.1 (30 Apr 2020 05:15), Max: 36.2 (29 Apr 2020 10:27)  T(F): 96.9 (30 Apr 2020 05:15), Max: 97.2 (29 Apr 2020 10:27)  HR: 54 (30 Apr 2020 05:15) (54 - 99)  BP: 137/80 (30 Apr 2020 05:15) (125/78 - 152/79)  BP(mean): --  RR: 18 (30 Apr 2020 05:15) (18 - 18)  SpO2: 98% (30 Apr 2020 05:15) (98% - 99%)      PHYSICAL EXAM  General: NAD  HEENT: PERRLA, EOMI, clear oropharynx  CV: (+) S1/S2 RRR  Lungs: clear to auscultation, no wheezes or rales  Abdomen: soft, non-tender, non-distended (+) BS  Ext: no edema  Skin: no rashes   Neuro: alert and oriented X 3, no focal deficits  PIV: C/D/I           LABS:                        7.6    1.00  )-----------( 63       ( 30 Apr 2020 07:17 )             20.6         Mean Cell Volume : 82.7 fl  Mean Cell Hemoglobin : 30.5 pg  Mean Cell Hemoglobin Concentration : 36.9 gm/dL  Auto Neutrophil # : 0.20 K/uL  Auto Lymphocyte # : 0.64 K/uL  Auto Monocyte # : 0.16 K/uL  Auto Eosinophil # : 0.00 K/uL  Auto Basophil # : 0.00 K/uL  Auto Neutrophil % : 20.0 %  Auto Lymphocyte % : 64.0 %  Auto Monocyte % : 16.0 %  Auto Eosinophil % : 0.0 %  Auto Basophil % : 0.0 %      04-30    137  |  104  |  21  ----------------------------<  97  3.7   |  23  |  0.79    Ca    8.4      30 Apr 2020 07:17  Phos  2.9     04-30  Mg     1.9     04-30    TPro  5.9<L>  /  Alb  3.2<L>  /  TBili  1.6<H>  /  DBili  x   /  AST  73<H>  /  ALT  275<H>  /  AlkPhos  64  04-30      Mg 1.9  Phos 2.9      PT/INR - ( 30 Apr 2020 07:17 )   PT: 12.1 sec;   INR: 1.06 ratio         PTT - ( 30 Apr 2020 07:17 )  PTT:22.8 sec      Uric Acid 2.5

## 2020-04-30 NOTE — PROGRESS NOTE ADULT - ATTENDING COMMENTS
34M with no PMH presented to OSH with dizziness x 2 weeks, easy bruising x 2-4 weeks. At home had a syncopal event and called 911, found to have hemoglobin 3s, Platelets 2K, no evidence of DIC. Noted mild petechiae and has had gum bleeding. These have improved. Noted dark stools for 2 weeks. Peripheral smear with immature forms concerning for acute leukemia vs. aplastic anemia   -peripheral flow without blast population, FISH pending   -received ATRA 50 mg BID until PML-CHANTEL[employer] negative   -Bone marrow evaluation(4/20)c/w severe aplastic anemia. Treatment plan: hATG, Cyclosporine, Eltrombopag.   - ATG test dose negative. Begin ATG, CSA, Eltrombopag on 4/23  -patient developed hives and short episode of wheezing with ATG infusion, which resolved- will continue Solumedrol pre-med with ATG(hold Prednisone), then resume Prednisone on day 5 to complete recommended course for prevention of serum sickness.  - IVF, mouth care; discontinued Allopurinol  -receiving transfusions if Hgb< 7, Plt <50k   - s/p vit K for elevated INR  -optho consult given hazy vision, suspect retinal bleed given low platelets. Platelet transfusional support for goal >50k.   -monitor for s/s of bleeding, gum bleeding intermittent   -Paranasal sinusitis on CT head(4/17)- cont Levaquin, begin Posaconazole; on Mepron and Acyclovir with immunosuppressive therapy.  - d7 of treatment, cont transfusion support prn 34M with no PMH presented to OSH with dizziness x 2 weeks, easy bruising x 2-4 weeks. At home had a syncopal event and called 911, found to have hemoglobin 3s, Platelets 2K, no evidence of DIC. Noted mild petechiae and has had gum bleeding. These have improved. Noted dark stools for 2 weeks. Peripheral smear with immature forms concerning for acute leukemia vs. aplastic anemia   -peripheral flow without blast population, FISH pending   -received ATRA 50 mg BID until PML-[employer] negative   -Bone marrow evaluation(4/20)c/w severe aplastic anemia. Treatment plan: hATG, Cyclosporine, Eltrombopag.   - ATG test dose negative. Begin ATG, CSA, Eltrombopag on 4/23  -patient developed hives and short episode of wheezing with ATG infusion, which resolved- will continue Solumedrol pre-med with ATG(hold Prednisone), then resume Prednisone on day 5 to complete recommended course for prevention of serum sickness.  - IVF, mouth care; discontinued Allopurinol  -receiving transfusions if Hgb< 7, Plt <50k   - s/p vit K for elevated INR  -optho consult given hazy vision, suspect retinal bleed given low platelets. Platelet transfusional support for goal >50k.   -monitor for s/s of bleeding, gum bleeding intermittent   -Paranasal sinusitis on CT head(4/17)- cont Levaquin, begin Posaconazole; on Mepron and Acyclovir with immunosuppressive therapy.  - d8 of treatment, cont transfusion support prn  -some abdominal discomfort, likely gastritis type symptoms due to steroid, increased pepcid to BID.

## 2020-04-30 NOTE — PROGRESS NOTE ADULT - ASSESSMENT
33 y/o Male with no PMH p/w dizziness in setting of 2 weeks of fevers/chills, melena and easy bruising, found to be pancytopenic with Hb 3.6 and platelets of 2,000.  Patient has been transferred to [facility] from [facility] for further hematologic work-up. Bone Marrow biopsy performed 4/20 revealed Aplastic Anemia. Treatment consisting of equine ATG, Cyclosporine, Prednisone, and Eltrombopag started 4/23. Hospital course complicated by retinal hemorrhage, reaction to ATG of hives treated with IV solumedrol, hyperbilirubinemia, and grade 3 transaminitis likely drug induced. Patient has pancytopenia secondary to disease process. This is a 33 yo M with no significant PMHx admitted for management of newly diagnosed Aplastic Anemia. The patient is status post Equine ATG and continues CSA/Prednisone/Eltrombopag. The patients hospital course has been complicated by retinal hemorrhage, ATG reaction and liver dysfunction with hyperbilirubinemia and grade 3 transaminitis likely drug induced. The patient has pancytopenia secondary to treatment and/or disease process.

## 2020-04-30 NOTE — CHART NOTE - NSCHARTNOTEFT_GEN_A_CORE
Nutrition Follow Up Note  Patient seen for: Nutrition follow up. Pt with newly diagnosed Aplastic anemia receiving treatment.     Source: Pt    Diet : Regular diet with ensure enlive 1 daily     Patient reports: No N+V, last BM overnight      PO intake : Pt reports intake is decreased in the morning which pt attributes to morning medication, pt stated his stomach will settle right before lunch and pt is able to eat >75% of lunch and dinner. Pt will take small snacks in between such as applesauce and will drink ensure enlive intermittently.      Weight: 215.4 lbs (4/18), 212.9 lbs (4/24), 211.2 lbs (4/27), 217.5 lbs (4/28), 210.5 lbs (4/29), weight slightly decreased from admission, seems to be stabilizing over the past week with minor fluctuations.     Pertinent Medications: MEDICATIONS  (STANDING):  acyclovir   Oral Tab/Cap 400 milliGRAM(s) Oral every 8 hours  antithymocyte globulin equine Skin Test 0.1 milliLiter(s) IntraDermal once  atovaquone Suspension 750 milliGRAM(s) Oral every 12 hours  Biotene Dry Mouth Oral Rinse 5 milliLiter(s) Swish and Spit five times a day  caspofungin IVPB      caspofungin IVPB 50 milliGRAM(s) IV Intermittent every 24 hours  cycloSPORINE  (SandIMMUNE)  Solution 485 milliGRAM(s) Oral every 12 hours  eltrombopag 150 milliGRAM(s) Oral daily  famotidine    Tablet 20 milliGRAM(s) Oral daily  levoFLOXacin  Tablet 500 milliGRAM(s) Oral every 24 hours  predniSONE   Tablet 100 milliGRAM(s) Oral daily  sodium chloride 0.9%. 1000 milliLiter(s) (75 mL/Hr) IV Continuous <Continuous>    MEDICATIONS  (PRN):  acetaminophen   Tablet .. 650 milliGRAM(s) Oral every 6 hours PRN Temp greater or equal to 38C (100.4F), Mild Pain (1 - 3)  acetaminophen   Tablet .. 650 milliGRAM(s) Oral once PRN Moderate Pain (4 - 6)  aluminum hydroxide/magnesium hydroxide/simethicone Suspension 30 milliLiter(s) Oral every 4 hours PRN Dyspepsia  EPINEPHrine     1 mG/mL Injectable 0.3 milliGRAM(s) IntraMuscular once PRN anaphylaxis  methylPREDNISolone sodium succinate IVPB 500 milliGRAM(s) IV Intermittent once PRN anaphylaxis  ondansetron Injectable 4 milliGRAM(s) IV Push every 6 hours PRN Nausea and/or Vomiting    Pertinent Labs: 04-30 @ 07:17: Na 137, BUN 21, Cr 0.79, BG 97, K+ 3.7, Phos 2.9, Mg 1.9, Alk Phos 64, ALT/SGPT 275<H>, AST/SGOT 73<H>, HbA1c --  04-29 @ 16:33: Na 138, BUN 21, Cr 0.70, <H>, K+ 3.9, Phos 3.0, Mg 2.0, Alk Phos 74, ALT/SGPT 248<H>, AST/SGOT 55<H>, HbA1c --    Finger Sticks: none       Skin per nursing documentation: free of pressure injury   Edema: none     Estimated Needs:   [ x] no change since previous assessment  [ ] recalculated:     Previous Nutrition Diagnosis: Predicted suboptimal energy intake   Nutrition Diagnosis is: ongoing     New Nutrition Diagnosis:  Related to:    As evidenced by:      Interventions:     Recommend  1) Continue regular diet with ensure enlive 1 daily, pt requesting double portions of proteins-will accommodate as able  2) Continue to encourage po intake and obtain/honor food preferences as able , RD reviewed ordering encouraged pt to order nutrient dense snacks with meals to consume in between to mimic smaller, more frequent meals in house with emphasis on foods with protein    Monitoring and Evaluation:     Continue to monitor Nutritional intake, Tolerance to diet prescription, weights, labs, skin integrity    RD remains available upon request and will follow up per protocol    Angie Maki R.D., [facility], Pager #[phone]

## 2020-04-30 NOTE — PROGRESS NOTE ADULT - PROBLEM SELECTOR PLAN 4
Pharmacologic DVT prophylaxis on hold due to thrombocytopenia    Contact: [phone] Intraretinal hemorrhage from thrombocytopenia and anemia  Transfuse to keep PLT >50k   Opthalmology consultation appreciated. Will need to follow up outpatient

## 2020-05-01 ENCOUNTER — TRANSCRIPTION ENCOUNTER (OUTPATIENT)
Age: 35
End: 2020-05-01

## 2020-05-01 LAB
ALBUMIN SERPL ELPH-MCNC: 3.5 G/DL — SIGNIFICANT CHANGE UP (ref 3.3–5)
ALP SERPL-CCNC: 82 U/L — SIGNIFICANT CHANGE UP (ref 40–120)
ALT FLD-CCNC: 528 U/L — HIGH (ref 10–45)
ANION GAP SERPL CALC-SCNC: 11 MMOL/L — SIGNIFICANT CHANGE UP (ref 5–17)
APTT BLD: 22.6 SEC — LOW (ref 27.5–36.3)
AST SERPL-CCNC: 163 U/L — HIGH (ref 10–40)
BASOPHILS # BLD AUTO: 0 K/UL — SIGNIFICANT CHANGE UP (ref 0–0.2)
BASOPHILS NFR BLD AUTO: 0 % — SIGNIFICANT CHANGE UP (ref 0–2)
BILIRUB SERPL-MCNC: 2.9 MG/DL — HIGH (ref 0.2–1.2)
BUN SERPL-MCNC: 19 MG/DL — SIGNIFICANT CHANGE UP (ref 7–23)
CALCIUM SERPL-MCNC: 8.5 MG/DL — SIGNIFICANT CHANGE UP (ref 8.4–10.5)
CHLORIDE SERPL-SCNC: 100 MMOL/L — SIGNIFICANT CHANGE UP (ref 96–108)
CO2 SERPL-SCNC: 26 MMOL/L — SIGNIFICANT CHANGE UP (ref 22–31)
CREAT SERPL-MCNC: 0.82 MG/DL — SIGNIFICANT CHANGE UP (ref 0.5–1.3)
D DIMER BLD IA.RAPID-MCNC: 297 NG/ML DDU — HIGH
EOSINOPHIL # BLD AUTO: 0.02 K/UL — SIGNIFICANT CHANGE UP (ref 0–0.5)
EOSINOPHIL NFR BLD AUTO: 2.5 % — SIGNIFICANT CHANGE UP (ref 0–6)
FIBRINOGEN PPP-MCNC: 437 MG/DL — SIGNIFICANT CHANGE UP (ref 320–500)
GLUCOSE SERPL-MCNC: 124 MG/DL — HIGH (ref 70–99)
HCT VFR BLD CALC: 21.2 % — LOW (ref 39–50)
HGB BLD-MCNC: 7.7 G/DL — LOW (ref 13–17)
INR BLD: 1.07 RATIO — SIGNIFICANT CHANGE UP (ref 0.88–1.16)
LDH SERPL L TO P-CCNC: 258 U/L — HIGH (ref 50–242)
LYMPHOCYTES # BLD AUTO: 0.44 K/UL — LOW (ref 1–3.3)
LYMPHOCYTES # BLD AUTO: 53.2 % — HIGH (ref 13–44)
MAGNESIUM SERPL-MCNC: 1.7 MG/DL — SIGNIFICANT CHANGE UP (ref 1.6–2.6)
MCHC RBC-ENTMCNC: 29.8 PG — SIGNIFICANT CHANGE UP (ref 27–34)
MCHC RBC-ENTMCNC: 36.3 GM/DL — HIGH (ref 32–36)
MCV RBC AUTO: 82.2 FL — SIGNIFICANT CHANGE UP (ref 80–100)
MONOCYTES # BLD AUTO: 0.04 K/UL — SIGNIFICANT CHANGE UP (ref 0–0.9)
MONOCYTES NFR BLD AUTO: 5.1 % — SIGNIFICANT CHANGE UP (ref 2–14)
NEUTROPHILS # BLD AUTO: 0.3 K/UL — LOW (ref 1.8–7.4)
NEUTROPHILS NFR BLD AUTO: 34.2 % — LOW (ref 43–77)
PHOSPHATE SERPL-MCNC: 2.6 MG/DL — SIGNIFICANT CHANGE UP (ref 2.5–4.5)
PLATELET # BLD AUTO: 46 K/UL — LOW (ref 150–400)
POTASSIUM SERPL-MCNC: 3.9 MMOL/L — SIGNIFICANT CHANGE UP (ref 3.5–5.3)
POTASSIUM SERPL-SCNC: 3.9 MMOL/L — SIGNIFICANT CHANGE UP (ref 3.5–5.3)
PROT SERPL-MCNC: 6.4 G/DL — SIGNIFICANT CHANGE UP (ref 6–8.3)
PROTHROM AB SERPL-ACNC: 12.3 SEC — SIGNIFICANT CHANGE UP (ref 10–12.9)
RBC # BLD: 2.58 M/UL — LOW (ref 4.2–5.8)
RBC # FLD: 12.3 % — SIGNIFICANT CHANGE UP (ref 10.3–14.5)
SODIUM SERPL-SCNC: 137 MMOL/L — SIGNIFICANT CHANGE UP (ref 135–145)
URATE SERPL-MCNC: 2.4 MG/DL — LOW (ref 3.4–8.8)
WBC # BLD: 0.82 K/UL — CRITICAL LOW (ref 3.8–10.5)
WBC # FLD AUTO: 0.82 K/UL — CRITICAL LOW (ref 3.8–10.5)

## 2020-05-01 PROCEDURE — 99233 SBSQ HOSP IP/OBS HIGH 50: CPT | Mod: GC

## 2020-05-01 RX ORDER — SUCRALFATE 1 G
1 TABLET ORAL EVERY 6 HOURS
Refills: 0 | Status: DISCONTINUED | OUTPATIENT
Start: 2020-05-01 | End: 2020-05-20

## 2020-05-01 RX ADMIN — Medication 5 MILLILITER(S): at 23:52

## 2020-05-01 RX ADMIN — FAMOTIDINE 20 MILLIGRAM(S): 10 INJECTION INTRAVENOUS at 04:43

## 2020-05-01 RX ADMIN — Medication 1 GRAM(S): at 23:52

## 2020-05-01 RX ADMIN — Medication 400 MILLIGRAM(S): at 21:16

## 2020-05-01 RX ADMIN — CASPOFUNGIN ACETATE 260 MILLIGRAM(S): 7 INJECTION, POWDER, LYOPHILIZED, FOR SOLUTION INTRAVENOUS at 08:56

## 2020-05-01 RX ADMIN — Medication 30 MILLILITER(S): at 04:58

## 2020-05-01 RX ADMIN — ATOVAQUONE 750 MILLIGRAM(S): 750 SUSPENSION ORAL at 04:36

## 2020-05-01 RX ADMIN — Medication 400 MILLIGRAM(S): at 15:18

## 2020-05-01 RX ADMIN — Medication 400 MILLIGRAM(S): at 04:36

## 2020-05-01 RX ADMIN — Medication 5 MILLILITER(S): at 08:56

## 2020-05-01 RX ADMIN — Medication 1 GRAM(S): at 17:40

## 2020-05-01 RX ADMIN — Medication 100 MILLIGRAM(S): at 04:36

## 2020-05-01 RX ADMIN — FAMOTIDINE 20 MILLIGRAM(S): 10 INJECTION INTRAVENOUS at 17:40

## 2020-05-01 RX ADMIN — Medication 1 GRAM(S): at 11:49

## 2020-05-01 RX ADMIN — CYCLOSPORINE 600 MILLIGRAM(S): 100 CAPSULE ORAL at 17:40

## 2020-05-01 RX ADMIN — Medication 5 MILLILITER(S): at 11:49

## 2020-05-01 RX ADMIN — Medication 5 MILLILITER(S): at 15:18

## 2020-05-01 RX ADMIN — CYCLOSPORINE 600 MILLIGRAM(S): 100 CAPSULE ORAL at 04:35

## 2020-05-01 RX ADMIN — Medication 30 MILLILITER(S): at 17:40

## 2020-05-01 RX ADMIN — ATOVAQUONE 750 MILLIGRAM(S): 750 SUSPENSION ORAL at 17:43

## 2020-05-01 RX ADMIN — SODIUM CHLORIDE 75 MILLILITER(S): 9 INJECTION INTRAMUSCULAR; INTRAVENOUS; SUBCUTANEOUS at 11:50

## 2020-05-01 RX ADMIN — Medication 5 MILLILITER(S): at 20:26

## 2020-05-01 NOTE — PROGRESS NOTE ADULT - ATTENDING COMMENTS
34M with no PMH presented to OSH with dizziness x 2 weeks, easy bruising x 2-4 weeks. At home had a syncopal event and called 911, found to have hemoglobin 3s, Platelets 2K, no evidence of DIC. Noted mild petechiae and has had gum bleeding. These have improved. Noted dark stools for 2 weeks. Peripheral smear with immature forms concerning for acute leukemia vs. aplastic anemia   -peripheral flow without blast population, FISH pending   -received ATRA 50 mg BID until PML-[employer] negative   -Bone marrow evaluation(4/20)c/w severe aplastic anemia. Treatment plan: hATG, Cyclosporine, Eltrombopag.   - ATG test dose negative. Begin ATG, CSA, Eltrombopag on 4/23  -patient developed hives and short episode of wheezing with ATG infusion, which resolved- will continue Solumedrol pre-med with ATG(hold Prednisone), then resume Prednisone on day 5 to complete recommended course for prevention of serum sickness.  - IVF, mouth care; discontinued Allopurinol  -receiving transfusions if Hgb< 7, Plt <50k   - s/p vit K for elevated INR  -optho consult given hazy vision, suspect retinal bleed given low platelets. Platelet transfusional support for goal >50k.   -monitor for s/s of bleeding, gum bleeding intermittent   -Paranasal sinusitis on CT head(4/17)- cont Levaquin, begin Posaconazole; on Mepron and Acyclovir with immunosuppressive therapy.  - d8 of treatment, cont transfusion support prn  -some abdominal discomfort, likely gastritis type symptoms due to steroid, increased pepcid to BID. 34M with no PMH presented to OSH with dizziness x 2 weeks, easy bruising x 2-4 weeks. At home had a syncopal event and called 911, found to have hemoglobin 3s, Platelets 2K, no evidence of DIC. Noted mild petechiae and has had gum bleeding. These have improved. Noted dark stools for 2 weeks. Peripheral smear with immature forms concerning for acute leukemia vs. aplastic anemia   -peripheral flow without blast population, FISH pending   -received ATRA 50 mg BID until PML-[employer] negative   -Bone marrow evaluation(4/20)c/w severe aplastic anemia. Treatment plan: hATG, Cyclosporine, Eltrombopag.   - ATG test dose negative. Begin ATG, CSA, Eltrombopag on 4/23  -patient developed hives and short episode of wheezing with ATG infusion, which resolved- will continue Solumedrol pre-med with ATG(hold Prednisone), then resume Prednisone on day 5 to complete recommended course for prevention of serum sickness.  - IVF, mouth care; discontinued Allopurinol  -receiving transfusions if Hgb< 7, Plt <50k   - s/p vit K for elevated INR  -optho consult given hazy vision, suspect retinal bleed given low platelets. Platelet transfusional support for goal >50k.   -monitor for s/s of bleeding, gum bleeding intermittent   -Paranasal sinusitis on CT head(4/17)- cont Levaquin, begin Posaconazole; on Mepron and Acyclovir with immunosuppressive therapy.  - d9 of treatment, cont transfusion support prn  -LFts increasing, had switched posaconazole to caspofungin on 4/29 and now holding promacta.   -some abdominal discomfort, likely gastritis type symptoms due to steroid, increased pepcid to BID yesterday and added carafate today.

## 2020-05-01 NOTE — PROGRESS NOTE ADULT - PROBLEM SELECTOR PLAN 1
4/20 BM Bx consistent with Aplastic Anemia  Status post Horse ATG 40mg/kg/day daily x 4 days (4/23- 4/26)  Continue Prednisone (taper to continue through 5/17), CSA and Eltrombopag  CSA level today 95. Dose increased to 600mh PO q 12 and repeat level due on 5/3  Monitor CBC/Lytes and transfuse/replete PRN  Keep PLT> 50k for retinal hemorrhage  Strict Is and Os/Daily weights/Mouth Care  IVF 4/20 BM Bx consistent with Aplastic Anemia  Status post Horse ATG 40mg/kg/day daily x 4 days (4/23- 4/26)  Continue Prednisone (taper to continue through 5/17) and CSA   CSA level today 95. Dose increased to 600mh PO q 12 and repeat level due on 5/3  Promacta on hold for increasing liver disfunction  Monitor CBC/Lytes and transfuse/replete PRN  Keep PLT> 50k for retinal hemorrhage  Strict Is and Os/Daily weights/Mouth Care  IVF 4/20 BM Bx consistent with Aplastic Anemia  Status post Horse ATG 40mg/kg/day daily x 4 days (4/23- 4/26)  Continue Prednisone (taper to continue through 5/17) and CSA   CSA level today 95. Dose increased to 600mh PO q 12 and repeat level due on 5/3  Promacta on hold for increasing liver disfunction  Monitor CBC/Lytes and transfuse/replete PRN  Keep PLT> 50k for retinal hemorrhage: 1 bag PLT today  Strict Is and Os/Daily weights/Mouth Care  IVF

## 2020-05-01 NOTE — PROGRESS NOTE ADULT - PROBLEM SELECTOR PLAN 4
Intraretinal hemorrhage from thrombocytopenia and anemia  Transfuse to keep PLT >50k   Opthalmology consultation appreciated. Will need to follow up outpatient

## 2020-05-01 NOTE — PROGRESS NOTE ADULT - PROBLEM SELECTOR PLAN 2
The patient is neutropenic, afebrile  If ferbile Pan Cx, CXR and change Levaquin to Cefepime  Continue Levaquin, Mepron, Caspofungin and Acyclovir for prophylaxis  COVID-19 surveillance (-) x 2 The patient is neutropenic, afebrile  If febrile Pan Cx, CXR and change Levaquin to Cefepime  Continue Levaquin, Mepron, Caspofungin and Acyclovir for prophylaxis  COVID-19 surveillance (-) x 2

## 2020-05-01 NOTE — PROGRESS NOTE ADULT - SUBJECTIVE AND OBJECTIVE BOX
Diagnosis:    Protocol/Chemo Regimen:    Day:     Pt endorsed:    Review of Systems:     Pain scale:     Diet:     Allergies    No Known Allergies    Intolerances        ANTIMICROBIALS  acyclovir   Oral Tab/Cap 400 milliGRAM(s) Oral every 8 hours  atovaquone Suspension 750 milliGRAM(s) Oral every 12 hours  caspofungin IVPB      caspofungin IVPB 50 milliGRAM(s) IV Intermittent every 24 hours  levoFLOXacin  Tablet 500 milliGRAM(s) Oral every 24 hours      HEME/ONC MEDICATIONS      STANDING MEDICATIONS  antithymocyte globulin equine Skin Test 0.1 milliLiter(s) IntraDermal once  Biotene Dry Mouth Oral Rinse 5 milliLiter(s) Swish and Spit five times a day  cycloSPORINE  (SandIMMUNE)  Solution 600 milliGRAM(s) Oral every 12 hours  famotidine    Tablet 20 milliGRAM(s) Oral two times a day  predniSONE   Tablet 100 milliGRAM(s) Oral daily  sodium chloride 0.9%. 1000 milliLiter(s) IV Continuous <Continuous>      PRN MEDICATIONS  acetaminophen   Tablet .. 650 milliGRAM(s) Oral every 6 hours PRN  acetaminophen   Tablet .. 650 milliGRAM(s) Oral once PRN  aluminum hydroxide/magnesium hydroxide/simethicone Suspension 30 milliLiter(s) Oral every 4 hours PRN  EPINEPHrine     1 mG/mL Injectable 0.3 milliGRAM(s) IntraMuscular once PRN  methylPREDNISolone sodium succinate IVPB 500 milliGRAM(s) IV Intermittent once PRN  ondansetron Injectable 4 milliGRAM(s) IV Push every 6 hours PRN        Vital Signs Last 24 Hrs  T(C): 36.1 (01 May 2020 05:35), Max: 36.9 (30 Apr 2020 18:14)  T(F): 97 (01 May 2020 05:35), Max: 98.4 (30 Apr 2020 18:14)  HR: 60 (01 May 2020 05:35) (60 - 72)  BP: 144/86 (01 May 2020 05:35) (123/70 - 144/86)  BP(mean): --  RR: 18 (01 May 2020 05:35) (17 - 18)  SpO2: 98% (01 May 2020 05:35) (97% - 98%)    PHYSICAL EXAM  General: NAD  HEENT: PERRLA, EOMI, clear oropharynx  Neck: supple  CV: (+) S1/S2 RRR  Lungs: clear to auscultation, no wheezes or rales  Abdomen: soft, non-tender, non-distended (+) BS  Ext: no edema  Skin: no rashes   Neuro: alert and oriented X 3, no focal deficits  Central Line:     RECENT CULTURES:  04-26 @ 04:51  .Stool Feces  --  --  --    No enteric pathogens isolated.  (Stool culture examined for Salmonella,  Shigella, Campylobacter, Aeromonas, Plesiomonas,  Vibrio, E.coli O157 and Yersinia)  --        LABS:                        7.9    0.70  )-----------( 59       ( 30 Apr 2020 19:49 )             21.6         Mean Cell Volume : 81.5 fl  Mean Cell Hemoglobin : 29.8 pg  Mean Cell Hemoglobin Concentration : 36.6 gm/dL  Auto Neutrophil # : x  Auto Lymphocyte # : x  Auto Monocyte # : x  Auto Eosinophil # : x  Auto Basophil # : x  Auto Neutrophil % : x  Auto Lymphocyte % : x  Auto Monocyte % : x  Auto Eosinophil % : x  Auto Basophil % : x      04-30    136  |  100  |  20  ----------------------------<  126<H>  4.0   |  23  |  0.93    Ca    8.7      30 Apr 2020 19:49  Phos  3.1     04-30  Mg     1.8     04-30    TPro  6.6  /  Alb  3.7  /  TBili  2.1<H>  /  DBili  x   /  AST  145<H>  /  ALT  438<H>  /  AlkPhos  83  04-30      Mg 1.8  Phos 3.1      PT/INR - ( 30 Apr 2020 19:49 )   PT: 12.7 sec;   INR: 1.10 ratio         PTT - ( 30 Apr 2020 19:49 )  PTT:22.4 sec      Uric Acid 2.4        RADIOLOGY & ADDITIONAL STUDIES: Diagnosis: Aplastic anemia    Protocol/Chemo Regimen: ATG/Cyclosporine/Eltrombopag/Prednisone (Previously received Solumedrol Days 1-4)    Day: 9    Pt endorsed: indigestion relieved with Maalox    Review of Systems: Patient denies headache, dizziness, visual changes, chest pain, palpitations, SOB, abdominal pain, nausea, vomiting, diarrhea or dysuria.    Pain scale: 0    Diet: Regular    Allergies: No Known Allergies      ANTIMICROBIALS  acyclovir   Oral Tab/Cap 400 milliGRAM(s) Oral every 8 hours  atovaquone Suspension 750 milliGRAM(s) Oral every 12 hours  caspofungin IVPB 50 milliGRAM(s) IV Intermittent every 24 hours  levoFLOXacin  Tablet 500 milliGRAM(s) Oral every 24 hours      STANDING MEDICATIONS  antithymocyte globulin equine Skin Test 0.1 milliLiter(s) IntraDermal once  Biotene Dry Mouth Oral Rinse 5 milliLiter(s) Swish and Spit five times a day  cycloSPORINE  (SandIMMUNE)  Solution 600 milliGRAM(s) Oral every 12 hours  famotidine    Tablet 20 milliGRAM(s) Oral two times a day  predniSONE   Tablet 100 milliGRAM(s) Oral daily  sodium chloride 0.9%. 1000 milliLiter(s) IV Continuous <Continuous>      PRN MEDICATIONS  acetaminophen   Tablet .. 650 milliGRAM(s) Oral every 6 hours PRN  acetaminophen   Tablet .. 650 milliGRAM(s) Oral once PRN  aluminum hydroxide/magnesium hydroxide/simethicone Suspension 30 milliLiter(s) Oral every 4 hours PRN  EPINEPHrine     1 mG/mL Injectable 0.3 milliGRAM(s) IntraMuscular once PRN  methylPREDNISolone sodium succinate IVPB 500 milliGRAM(s) IV Intermittent once PRN  ondansetron Injectable 4 milliGRAM(s) IV Push every 6 hours PRN      Vital Signs Last 24 Hrs  T(C): 36.1 (01 May 2020 05:35), Max: 36.9 (30 Apr 2020 18:14)  T(F): 97 (01 May 2020 05:35), Max: 98.4 (30 Apr 2020 18:14)  HR: 60 (01 May 2020 05:35) (60 - 72)  BP: 144/86 (01 May 2020 05:35) (123/70 - 144/86)  BP(mean): --  RR: 18 (01 May 2020 05:35) (17 - 18)  SpO2: 98% (01 May 2020 05:35) (97% - 98%)      PHYSICAL EXAM  General: NAD  HEENT: PERRLA, EOMI, clear oropharynx  CV: (+) S1/S2 RRR  Lungs: clear to auscultation, no wheezes or rales  Abdomen: soft, non-tender, non-distended (+) BS  Ext: no edema  Skin: no rashes   Neuro: alert and oriented X 3, no focal deficits  PIV: C/D/I       LABS: Diagnosis: Aplastic anemia    Protocol/Chemo Regimen: ATG/Cyclosporine/Prednisone (Previously received Solumedrol Days 1-4) Promacta on hold as of today for Grade 3 transaminitis    Day: 9    Pt endorsed: indigestion relieved with Maalox    Review of Systems: Patient denies headache, dizziness, visual changes, chest pain, palpitations, SOB, abdominal pain, nausea, vomiting, diarrhea or dysuria.    Pain scale: 0    Diet: Regular    Allergies: No Known Allergies      ANTIMICROBIALS  acyclovir   Oral Tab/Cap 400 milliGRAM(s) Oral every 8 hours  atovaquone Suspension 750 milliGRAM(s) Oral every 12 hours  caspofungin IVPB 50 milliGRAM(s) IV Intermittent every 24 hours  levoFLOXacin  Tablet 500 milliGRAM(s) Oral every 24 hours      STANDING MEDICATIONS  antithymocyte globulin equine Skin Test 0.1 milliLiter(s) IntraDermal once  Biotene Dry Mouth Oral Rinse 5 milliLiter(s) Swish and Spit five times a day  cycloSPORINE  (SandIMMUNE)  Solution 600 milliGRAM(s) Oral every 12 hours  famotidine    Tablet 20 milliGRAM(s) Oral two times a day  predniSONE   Tablet 100 milliGRAM(s) Oral daily  sodium chloride 0.9%. 1000 milliLiter(s) IV Continuous <Continuous>      PRN MEDICATIONS  acetaminophen   Tablet .. 650 milliGRAM(s) Oral every 6 hours PRN  acetaminophen   Tablet .. 650 milliGRAM(s) Oral once PRN  aluminum hydroxide/magnesium hydroxide/simethicone Suspension 30 milliLiter(s) Oral every 4 hours PRN  EPINEPHrine     1 mG/mL Injectable 0.3 milliGRAM(s) IntraMuscular once PRN  methylPREDNISolone sodium succinate IVPB 500 milliGRAM(s) IV Intermittent once PRN  ondansetron Injectable 4 milliGRAM(s) IV Push every 6 hours PRN      Vital Signs Last 24 Hrs  T(C): 36.1 (01 May 2020 05:35), Max: 36.9 (30 Apr 2020 18:14)  T(F): 97 (01 May 2020 05:35), Max: 98.4 (30 Apr 2020 18:14)  HR: 60 (01 May 2020 05:35) (60 - 72)  BP: 144/86 (01 May 2020 05:35) (123/70 - 144/86)  BP(mean): --  RR: 18 (01 May 2020 05:35) (17 - 18)  SpO2: 98% (01 May 2020 05:35) (97% - 98%)      PHYSICAL EXAM  General: NAD  HEENT: PERRLA, EOMI, clear oropharynx  CV: (+) S1/S2 RRR  Lungs: clear to auscultation, no wheezes or rales  Abdomen: soft, non-tender, non-distended (+) BS  Ext: no edema  Skin: no rashes   Neuro: alert and oriented X 3, no focal deficits  PIV: C/D/I         LABS:                        7.7    0.82  )-----------( 46       ( 01 May 2020 08:58 )             21.2         Mean Cell Volume : 82.2 fl  Mean Cell Hemoglobin : 29.8 pg  Mean Cell Hemoglobin Concentration : 36.3 gm/dL  Auto Neutrophil # : x  Auto Lymphocyte # : x  Auto Monocyte # : x  Auto Eosinophil # : x  Auto Basophil # : x  Auto Neutrophil % : x  Auto Lymphocyte % : x  Auto Monocyte % : x  Auto Eosinophil % : x  Auto Basophil % : x      05-01    137  |  100  |  19  ----------------------------<  124<H>  3.9   |  26  |  0.82    Ca    8.5      01 May 2020 09:00  Phos  2.6     05-01  Mg     1.7     05-01    TPro  6.4  /  Alb  3.5  /  TBili  2.9<H>  /  DBili  x   /  AST  163<H>  /  ALT  528<H>  /  AlkPhos  82  05-01      Mg 1.7  Phos 2.6      PT/INR - ( 01 May 2020 09:00 )   PT: 12.3 sec;   INR: 1.07 ratio         PTT - ( 01 May 2020 09:00 )  PTT:22.6 sec      Uric Acid 2.4 Diagnosis: Aplastic anemia    Protocol/Chemo Regimen: ATG/Cyclosporine/Prednisone (Previously received Solumedrol Days 1-4) Promacta on hold as of today for Grade 3 transaminitis    Day: 9    Pt endorsed: indigestion in the morning    Review of Systems: Patient denies headache, dizziness, visual changes, chest pain, palpitations, SOB, abdominal pain, nausea, vomiting, diarrhea or dysuria.    Pain scale: 0    Diet: Regular    Allergies: No Known Allergies      ANTIMICROBIALS  acyclovir   Oral Tab/Cap 400 milliGRAM(s) Oral every 8 hours  atovaquone Suspension 750 milliGRAM(s) Oral every 12 hours  caspofungin IVPB 50 milliGRAM(s) IV Intermittent every 24 hours  levoFLOXacin  Tablet 500 milliGRAM(s) Oral every 24 hours      STANDING MEDICATIONS  antithymocyte globulin equine Skin Test 0.1 milliLiter(s) IntraDermal once  Biotene Dry Mouth Oral Rinse 5 milliLiter(s) Swish and Spit five times a day  cycloSPORINE  (SandIMMUNE)  Solution 600 milliGRAM(s) Oral every 12 hours  famotidine    Tablet 20 milliGRAM(s) Oral two times a day  predniSONE   Tablet 100 milliGRAM(s) Oral daily  sodium chloride 0.9%. 1000 milliLiter(s) IV Continuous <Continuous>      PRN MEDICATIONS  acetaminophen   Tablet .. 650 milliGRAM(s) Oral every 6 hours PRN  acetaminophen   Tablet .. 650 milliGRAM(s) Oral once PRN  aluminum hydroxide/magnesium hydroxide/simethicone Suspension 30 milliLiter(s) Oral every 4 hours PRN  EPINEPHrine     1 mG/mL Injectable 0.3 milliGRAM(s) IntraMuscular once PRN  methylPREDNISolone sodium succinate IVPB 500 milliGRAM(s) IV Intermittent once PRN  ondansetron Injectable 4 milliGRAM(s) IV Push every 6 hours PRN      Vital Signs Last 24 Hrs  T(C): 36.1 (01 May 2020 05:35), Max: 36.9 (30 Apr 2020 18:14)  T(F): 97 (01 May 2020 05:35), Max: 98.4 (30 Apr 2020 18:14)  HR: 60 (01 May 2020 05:35) (60 - 72)  BP: 144/86 (01 May 2020 05:35) (123/70 - 144/86)  BP(mean): --  RR: 18 (01 May 2020 05:35) (17 - 18)  SpO2: 98% (01 May 2020 05:35) (97% - 98%)      PHYSICAL EXAM  General: NAD  HEENT: PERRLA, EOMI, clear oropharynx  CV: (+) S1/S2 RRR  Lungs: clear to auscultation, no wheezes or rales  Abdomen: soft, non-tender, non-distended (+) BS  Ext: no edema  Skin: no rashes   Neuro: alert and oriented X 3, no focal deficits  PIV: C/D/I         LABS:                        7.7    0.82  )-----------( 46       ( 01 May 2020 08:58 )             21.2         Mean Cell Volume : 82.2 fl  Mean Cell Hemoglobin : 29.8 pg  Mean Cell Hemoglobin Concentration : 36.3 gm/dL  Auto Neutrophil # : x  Auto Lymphocyte # : x  Auto Monocyte # : x  Auto Eosinophil # : x  Auto Basophil # : x  Auto Neutrophil % : x  Auto Lymphocyte % : x  Auto Monocyte % : x  Auto Eosinophil % : x  Auto Basophil % : x      05-01    137  |  100  |  19  ----------------------------<  124<H>  3.9   |  26  |  0.82    Ca    8.5      01 May 2020 09:00  Phos  2.6     05-01  Mg     1.7     05-01    TPro  6.4  /  Alb  3.5  /  TBili  2.9<H>  /  DBili  x   /  AST  163<H>  /  ALT  528<H>  /  AlkPhos  82  05-01      Mg 1.7  Phos 2.6      PT/INR - ( 01 May 2020 09:00 )   PT: 12.3 sec;   INR: 1.07 ratio         PTT - ( 01 May 2020 09:00 )  PTT:22.6 sec      Uric Acid 2.4

## 2020-05-01 NOTE — PROGRESS NOTE ADULT - ASSESSMENT
This is a 35 yo M with no significant PMHx admitted for management of newly diagnosed Aplastic Anemia. The patient is status post Equine ATG and continues CSA/Prednisone/Eltrombopag. The patients hospital course has been complicated by retinal hemorrhage, ATG reaction and liver dysfunction with hyperbilirubinemia and grade 3 transaminitis likely drug induced. The patient has pancytopenia secondary to treatment and/or disease process.

## 2020-05-01 NOTE — PROGRESS NOTE ADULT - PROBLEM SELECTOR PLAN 3
Grade 3 transaminitis with hyperbilirubinemia  Bilirubin improving and transaminitis appears to be plateauing  Will consider holding Promacta if any further increase in ALT Grade 3 transaminitis with hyperbilirubinemia, increased  Promacta on hold as of today. Will re-evaluate when to restart

## 2020-05-02 LAB
ALBUMIN SERPL ELPH-MCNC: 3.5 G/DL — SIGNIFICANT CHANGE UP (ref 3.3–5)
ALP SERPL-CCNC: 90 U/L — SIGNIFICANT CHANGE UP (ref 40–120)
ALT FLD-CCNC: 555 U/L — HIGH (ref 10–45)
ANION GAP SERPL CALC-SCNC: 12 MMOL/L — SIGNIFICANT CHANGE UP (ref 5–17)
AST SERPL-CCNC: 138 U/L — HIGH (ref 10–40)
BASOPHILS # BLD AUTO: 0 K/UL — SIGNIFICANT CHANGE UP (ref 0–0.2)
BASOPHILS NFR BLD AUTO: 0 % — SIGNIFICANT CHANGE UP (ref 0–2)
BILIRUB SERPL-MCNC: 2.1 MG/DL — HIGH (ref 0.2–1.2)
BUN SERPL-MCNC: 24 MG/DL — HIGH (ref 7–23)
CALCIUM SERPL-MCNC: 8.8 MG/DL — SIGNIFICANT CHANGE UP (ref 8.4–10.5)
CHLORIDE SERPL-SCNC: 101 MMOL/L — SIGNIFICANT CHANGE UP (ref 96–108)
CO2 SERPL-SCNC: 26 MMOL/L — SIGNIFICANT CHANGE UP (ref 22–31)
CREAT SERPL-MCNC: 0.78 MG/DL — SIGNIFICANT CHANGE UP (ref 0.5–1.3)
EOSINOPHIL # BLD AUTO: 0 K/UL — SIGNIFICANT CHANGE UP (ref 0–0.5)
EOSINOPHIL NFR BLD AUTO: 0 % — SIGNIFICANT CHANGE UP (ref 0–6)
GLUCOSE SERPL-MCNC: 117 MG/DL — HIGH (ref 70–99)
HCT VFR BLD CALC: 20.6 % — CRITICAL LOW (ref 39–50)
HGB BLD-MCNC: 7.5 G/DL — LOW (ref 13–17)
LDH SERPL L TO P-CCNC: 241 U/L — SIGNIFICANT CHANGE UP (ref 50–242)
LYMPHOCYTES # BLD AUTO: 0.95 K/UL — LOW (ref 1–3.3)
LYMPHOCYTES # BLD AUTO: 71.6 % — HIGH (ref 13–44)
MAGNESIUM SERPL-MCNC: 1.7 MG/DL — SIGNIFICANT CHANGE UP (ref 1.6–2.6)
MCHC RBC-ENTMCNC: 30.1 PG — SIGNIFICANT CHANGE UP (ref 27–34)
MCHC RBC-ENTMCNC: 36.4 GM/DL — HIGH (ref 32–36)
MCV RBC AUTO: 82.7 FL — SIGNIFICANT CHANGE UP (ref 80–100)
MONOCYTES # BLD AUTO: 0.05 K/UL — SIGNIFICANT CHANGE UP (ref 0–0.9)
MONOCYTES NFR BLD AUTO: 3.7 % — SIGNIFICANT CHANGE UP (ref 2–14)
NEUTROPHILS # BLD AUTO: 0.3 K/UL — LOW (ref 1.8–7.4)
NEUTROPHILS NFR BLD AUTO: 22.9 % — LOW (ref 43–77)
PHOSPHATE SERPL-MCNC: 3.3 MG/DL — SIGNIFICANT CHANGE UP (ref 2.5–4.5)
PLATELET # BLD AUTO: 69 K/UL — LOW (ref 150–400)
POTASSIUM SERPL-MCNC: 3.1 MMOL/L — LOW (ref 3.5–5.3)
POTASSIUM SERPL-SCNC: 3.1 MMOL/L — LOW (ref 3.5–5.3)
PROT SERPL-MCNC: 6.2 G/DL — SIGNIFICANT CHANGE UP (ref 6–8.3)
RBC # BLD: 2.49 M/UL — LOW (ref 4.2–5.8)
RBC # FLD: 12.3 % — SIGNIFICANT CHANGE UP (ref 10.3–14.5)
SODIUM SERPL-SCNC: 139 MMOL/L — SIGNIFICANT CHANGE UP (ref 135–145)
URATE SERPL-MCNC: 2.8 MG/DL — LOW (ref 3.4–8.8)
WBC # BLD: 1.32 K/UL — LOW (ref 3.8–10.5)
WBC # FLD AUTO: 1.32 K/UL — LOW (ref 3.8–10.5)

## 2020-05-02 PROCEDURE — 99232 SBSQ HOSP IP/OBS MODERATE 35: CPT

## 2020-05-02 RX ORDER — POTASSIUM CHLORIDE 20 MEQ
40 PACKET (EA) ORAL EVERY 4 HOURS
Refills: 0 | Status: COMPLETED | OUTPATIENT
Start: 2020-05-02 | End: 2020-05-02

## 2020-05-02 RX ADMIN — ATOVAQUONE 750 MILLIGRAM(S): 750 SUSPENSION ORAL at 05:42

## 2020-05-02 RX ADMIN — Medication 5 MILLILITER(S): at 08:22

## 2020-05-02 RX ADMIN — FAMOTIDINE 20 MILLIGRAM(S): 10 INJECTION INTRAVENOUS at 05:42

## 2020-05-02 RX ADMIN — Medication 1 GRAM(S): at 17:07

## 2020-05-02 RX ADMIN — CYCLOSPORINE 600 MILLIGRAM(S): 100 CAPSULE ORAL at 17:08

## 2020-05-02 RX ADMIN — Medication 40 MILLIEQUIVALENT(S): at 13:28

## 2020-05-02 RX ADMIN — Medication 5 MILLILITER(S): at 23:10

## 2020-05-02 RX ADMIN — Medication 5 MILLILITER(S): at 20:24

## 2020-05-02 RX ADMIN — Medication 5 MILLILITER(S): at 17:07

## 2020-05-02 RX ADMIN — Medication 30 MILLILITER(S): at 23:11

## 2020-05-02 RX ADMIN — Medication 400 MILLIGRAM(S): at 13:28

## 2020-05-02 RX ADMIN — CYCLOSPORINE 600 MILLIGRAM(S): 100 CAPSULE ORAL at 05:42

## 2020-05-02 RX ADMIN — Medication 1 GRAM(S): at 23:11

## 2020-05-02 RX ADMIN — Medication 1 GRAM(S): at 13:28

## 2020-05-02 RX ADMIN — Medication 1 GRAM(S): at 05:43

## 2020-05-02 RX ADMIN — Medication 400 MILLIGRAM(S): at 21:24

## 2020-05-02 RX ADMIN — Medication 100 MILLIGRAM(S): at 05:42

## 2020-05-02 RX ADMIN — Medication 5 MILLILITER(S): at 13:28

## 2020-05-02 RX ADMIN — FAMOTIDINE 20 MILLIGRAM(S): 10 INJECTION INTRAVENOUS at 17:07

## 2020-05-02 RX ADMIN — CASPOFUNGIN ACETATE 260 MILLIGRAM(S): 7 INJECTION, POWDER, LYOPHILIZED, FOR SOLUTION INTRAVENOUS at 08:23

## 2020-05-02 RX ADMIN — ATOVAQUONE 750 MILLIGRAM(S): 750 SUSPENSION ORAL at 17:07

## 2020-05-02 RX ADMIN — Medication 40 MILLIEQUIVALENT(S): at 08:23

## 2020-05-02 RX ADMIN — Medication 400 MILLIGRAM(S): at 05:43

## 2020-05-02 NOTE — PROGRESS NOTE ADULT - PROBLEM SELECTOR PLAN 2
The patient is neutropenic, afebrile  If febrile Pan Cx, CXR and change Levaquin to Cefepime  Continue Levaquin, Mepron, Caspofungin and Acyclovir for prophylaxis  COVID-19 surveillance (-) x 2

## 2020-05-02 NOTE — PROGRESS NOTE ADULT - SUBJECTIVE AND OBJECTIVE BOX
Diagnosis:    Protocol/Chemo Regimen:    Day:     Pt endorsed:    Review of Systems:     Pain scale:     Diet:     Allergies    No Known Allergies    Intolerances        ANTIMICROBIALS  acyclovir   Oral Tab/Cap 400 milliGRAM(s) Oral every 8 hours  atovaquone Suspension 750 milliGRAM(s) Oral every 12 hours  caspofungin IVPB      caspofungin IVPB 50 milliGRAM(s) IV Intermittent every 24 hours  levoFLOXacin  Tablet 500 milliGRAM(s) Oral every 24 hours      HEME/ONC MEDICATIONS      STANDING MEDICATIONS  antithymocyte globulin equine Skin Test 0.1 milliLiter(s) IntraDermal once  Biotene Dry Mouth Oral Rinse 5 milliLiter(s) Swish and Spit five times a day  cycloSPORINE  (SandIMMUNE)  Solution 600 milliGRAM(s) Oral every 12 hours  famotidine    Tablet 20 milliGRAM(s) Oral two times a day  potassium chloride    Tablet ER 40 milliEquivalent(s) Oral every 4 hours  sodium chloride 0.9%. 1000 milliLiter(s) IV Continuous <Continuous>  sucralfate suspension 1 Gram(s) Oral every 6 hours      PRN MEDICATIONS  acetaminophen   Tablet .. 650 milliGRAM(s) Oral every 6 hours PRN  acetaminophen   Tablet .. 650 milliGRAM(s) Oral once PRN  aluminum hydroxide/magnesium hydroxide/simethicone Suspension 30 milliLiter(s) Oral every 4 hours PRN  EPINEPHrine     1 mG/mL Injectable 0.3 milliGRAM(s) IntraMuscular once PRN  methylPREDNISolone sodium succinate IVPB 500 milliGRAM(s) IV Intermittent once PRN  ondansetron Injectable 4 milliGRAM(s) IV Push every 6 hours PRN        Vital Signs Last 24 Hrs  T(C): 36.6 (02 May 2020 05:04), Max: 36.9 (01 May 2020 12:00)  T(F): 97.8 (02 May 2020 05:04), Max: 98.4 (01 May 2020 12:00)  HR: 73 (02 May 2020 05:04) (61 - 84)  BP: 135/80 (02 May 2020 05:04) (132/76 - 159/95)  BP(mean): --  RR: 18 (02 May 2020 05:04) (18 - 18)  SpO2: 96% (02 May 2020 05:04) (96% - 98%)    PHYSICAL EXAM  General: NAD  HEENT: PERRLA, EOMI, clear oropharynx  Neck: supple  CV: (+) S1/S2 RRR  Lungs: clear to auscultation, no wheezes or rales  Abdomen: soft, non-tender, non-distended (+) BS  Ext: no edema  Skin: no rashes   Neuro: alert and oriented X 3, no focal deficits  Central Line:     RECENT CULTURES:  04-26 @ 04:51  .Stool Feces  --  --  --    No enteric pathogens isolated.  (Stool culture examined for Salmonella,  Shigella, Campylobacter, Aeromonas, Plesiomonas,  Vibrio, E.coli O157 and Yersinia)  --        LABS:                        7.5    1.32  )-----------( 69       ( 02 May 2020 05:58 )             20.6         Mean Cell Volume : 82.7 fl  Mean Cell Hemoglobin : 30.1 pg  Mean Cell Hemoglobin Concentration : 36.4 gm/dL  Auto Neutrophil # : 0.30 K/uL  Auto Lymphocyte # : 0.95 K/uL  Auto Monocyte # : 0.05 K/uL  Auto Eosinophil # : 0.00 K/uL  Auto Basophil # : 0.00 K/uL  Auto Neutrophil % : 22.9 %  Auto Lymphocyte % : 71.6 %  Auto Monocyte % : 3.7 %  Auto Eosinophil % : 0.0 %  Auto Basophil % : 0.0 %      05-02    139  |  101  |  24<H>  ----------------------------<  117<H>  3.1<L>   |  26  |  0.78    Ca    8.8      02 May 2020 05:58  Phos  3.3     05-02  Mg     1.7     05-02    TPro  6.2  /  Alb  3.5  /  TBili  2.1<H>  /  DBili  x   /  AST  138<H>  /  ALT  555<H>  /  AlkPhos  90  05-02      Mg 1.7  Phos 3.3  Mg 1.7  Phos 2.6      PT/INR - ( 01 May 2020 09:00 )   PT: 12.3 sec;   INR: 1.07 ratio         PTT - ( 01 May 2020 09:00 )  PTT:22.6 sec      Uric Acid 2.8      Uric Acid 2.4        RADIOLOGY & ADDITIONAL STUDIES: Diagnosis: Aplastic anemia    Protocol/Chemo Regimen: ATG/Cyclosporine/Prednisone (Previously received Solumedrol Days 1-4) Promacta on hold as of today for Grade 3 transaminitis    Day: 10    Pt endorsed: indigestion in the morning    Review of Systems: Patient denies headache, dizziness, visual changes, chest pain, palpitations, SOB, abdominal pain, nausea, vomiting, diarrhea or dysuria.    Pain scale: 0    Diet: Regular    Allergies: No Known Allergies      ANTIMICROBIALS  acyclovir   Oral Tab/Cap 400 milliGRAM(s) Oral every 8 hours  atovaquone Suspension 750 milliGRAM(s) Oral every 12 hours  caspofungin IVPB 50 milliGRAM(s) IV Intermittent every 24 hours  levoFLOXacin  Tablet 500 milliGRAM(s) Oral every 24 hour      STANDING MEDICATIONS  antithymocyte globulin equine Skin Test 0.1 milliLiter(s) IntraDermal once  Biotene Dry Mouth Oral Rinse 5 milliLiter(s) Swish and Spit five times a day  cycloSPORINE  (SandIMMUNE)  Solution 600 milliGRAM(s) Oral every 12 hours  famotidine    Tablet 20 milliGRAM(s) Oral two times a day  potassium chloride    Tablet ER 40 milliEquivalent(s) Oral every 4 hours  sodium chloride 0.9%. 1000 milliLiter(s) IV Continuous <Continuous>  sucralfate suspension 1 Gram(s) Oral every 6 hours      PRN MEDICATIONS  acetaminophen   Tablet .. 650 milliGRAM(s) Oral every 6 hours PRN  acetaminophen   Tablet .. 650 milliGRAM(s) Oral once PRN  aluminum hydroxide/magnesium hydroxide/simethicone Suspension 30 milliLiter(s) Oral every 4 hours PRN  EPINEPHrine     1 mG/mL Injectable 0.3 milliGRAM(s) IntraMuscular once PRN  methylPREDNISolone sodium succinate IVPB 500 milliGRAM(s) IV Intermittent once PRN  ondansetron Injectable 4 milliGRAM(s) IV Push every 6 hours PRN      Vital Signs Last 24 Hrs  T(C): 36.6 (02 May 2020 05:04), Max: 36.9 (01 May 2020 12:00)  T(F): 97.8 (02 May 2020 05:04), Max: 98.4 (01 May 2020 12:00)  HR: 73 (02 May 2020 05:04) (61 - 84)  BP: 135/80 (02 May 2020 05:04) (132/76 - 159/95)  BP(mean): --  RR: 18 (02 May 2020 05:04) (18 - 18)  SpO2: 96% (02 May 2020 05:04) (96% - 98%)      PHYSICAL EXAM  General: NAD  HEENT: PERRLA, EOMI, clear oropharynx  CV: (+) S1/S2 RRR  Lungs: clear to auscultation, no wheezes or rales  Abdomen: soft, non-tender, non-distended (+) BS  Ext: no edema  Skin: no rashes   Neuro: alert and oriented X 3, no focal deficits  PIV: C/D/I       LABS:                        7.5    1.32  )-----------( 69       ( 02 May 2020 05:58 )             20.6         Mean Cell Volume : 82.7 fl  Mean Cell Hemoglobin : 30.1 pg  Mean Cell Hemoglobin Concentration : 36.4 gm/dL  Auto Neutrophil # : 0.30 K/uL  Auto Lymphocyte # : 0.95 K/uL  Auto Monocyte # : 0.05 K/uL  Auto Eosinophil # : 0.00 K/uL  Auto Basophil # : 0.00 K/uL  Auto Neutrophil % : 22.9 %  Auto Lymphocyte % : 71.6 %  Auto Monocyte % : 3.7 %  Auto Eosinophil % : 0.0 %  Auto Basophil % : 0.0 %      05-02    139  |  101  |  24<H>  ----------------------------<  117<H>  3.1<L>   |  26  |  0.78    Ca    8.8      02 May 2020 05:58  Phos  3.3     05-02  Mg     1.7     05-02    TPro  6.2  /  Alb  3.5  /  TBili  2.1<H>  /  DBili  x   /  AST  138<H>  /  ALT  555<H>  /  AlkPhos  90  05-02      Mg 1.7  Phos 3.3    PT/INR - ( 01 May 2020 09:00 )   PT: 12.3 sec;   INR: 1.07 ratio         PTT - ( 01 May 2020 09:00 )  PTT:22.6 sec      Uric Acid 2.8

## 2020-05-02 NOTE — PROGRESS NOTE ADULT - ATTENDING COMMENTS
34M with no PMH presented to OSH with dizziness x 2 weeks, easy bruising x 2-4 weeks. At home had a syncopal event and called 911, found to have hemoglobin 3s, Platelets 2K, no evidence of DIC. Noted mild petechiae and has had gum bleeding. These have improved. Noted dark stools for 2 weeks. Peripheral smear with immature forms concerning for acute leukemia vs. aplastic anemia   -peripheral flow without blast population, FISH pending   -received ATRA 50 mg BID until PML-[employer] negative   -Bone marrow evaluation(4/20)c/w severe aplastic anemia. Treatment plan: hATG, Cyclosporine, Eltrombopag.   - ATG test dose negative. Begin ATG, CSA, Eltrombopag on 4/23  -patient developed hives and short episode of wheezing with ATG infusion, which resolved- will continue Solumedrol pre-med with ATG(hold Prednisone), then resume Prednisone on day 5 to complete recommended course for prevention of serum sickness.  - IVF, mouth care; discontinued Allopurinol  -receiving transfusions if Hgb< 7, Plt <50k   - s/p vit K for elevated INR  -optho consult given hazy vision, suspect retinal bleed given low platelets. Platelet transfusional support for goal >50k.   -monitor for s/s of bleeding, gum bleeding intermittent   -Paranasal sinusitis on CT head(4/17)- cont Levaquin, begin Posaconazole; on Mepron and Acyclovir with immunosuppressive therapy.  - d9 of treatment, cont transfusion support prn  -LFts increasing, had switched posaconazole to caspofungin on 4/29 and now holding promacta.   -some abdominal discomfort, likely gastritis type symptoms due to steroid, increased pepcid to BID yesterday and added carafate today. 34M with no PMH presented to OSH with dizziness x 2 weeks, easy bruising x 2-4 weeks. At home had a syncopal event and called 911, found to have hemoglobin 3s, Platelets 2K, no evidence of DIC. Noted mild petechiae and has had gum bleeding. These have improved. Noted dark stools for 2 weeks. Peripheral smear with immature forms concerning for acute leukemia vs. aplastic anemia   -peripheral flow without blast population, FISH pending   -received ATRA 50 mg BID until PML-[employer] negative   -Bone marrow evaluation(4/20)c/w severe aplastic anemia. Treatment plan: hATG, Cyclosporine, Eltrombopag.   - ATG test dose negative. Begin ATG, CSA, Eltrombopag on 4/23  -patient developed hives and short episode of wheezing with ATG infusion, which resolved- will continue Solumedrol pre-med with ATG(hold Prednisone), then resume Prednisone on day 5 to complete recommended course for prevention of serum sickness.  - IVF, mouth care; discontinued Allopurinol  -receiving transfusions if Hgb< 7, Plt <50k   - s/p vit K for elevated INR  -optho consult given hazy vision, suspect retinal bleed given low platelets. Platelet transfusional support for goal >50k.   -monitor for s/s of bleeding, gum bleeding intermittent   -Paranasal sinusitis on CT head(4/17)- cont Levaquin, begin Posaconazole; on Mepron and Acyclovir with immunosuppressive therapy.  - d10 of treatment, cont transfusion support prn  -LFts increasing, had switched posaconazole to caspofungin on 4/29 and now holding promacta. LFTs plateauing now. Bili downtrending 5/2.   -some abdominal discomfort, likely gastritis type symptoms due to steroid, increased pepcid to BID yesterday and added carafate.  -Carafate helped significantly with abdominal symptoms.

## 2020-05-02 NOTE — PROGRESS NOTE ADULT - ASSESSMENT
This is a 33 yo M with no significant PMHx admitted for management of newly diagnosed Aplastic Anemia. The patient is status post Equine ATG and continues CSA/Prednisone/Eltrombopag. The patients hospital course has been complicated by retinal hemorrhage, ATG reaction and liver dysfunction with hyperbilirubinemia and grade 3 transaminitis likely drug induced. The patient has pancytopenia secondary to treatment and/or disease process.

## 2020-05-02 NOTE — PROGRESS NOTE ADULT - PROBLEM SELECTOR PLAN 4
Intraretinal hemorrhage from thrombocytopenia and anemia  Transfuse to keep PLT >50k   Opthalmology consultation appreciated. Will need to follow up outpatient Normal rate, regular rhythm.  Heart sounds S1, S2.  No murmurs, rubs or gallops.

## 2020-05-02 NOTE — PROGRESS NOTE ADULT - PROBLEM SELECTOR PLAN 1
4/20 BM Bx consistent with Aplastic Anemia  Status post Horse ATG 40mg/kg/day daily x 4 days (4/23- 4/26)  Continue Prednisone (taper to continue through 5/17) and CSA   CSA level today 95. Dose increased to 600mh PO q 12 and repeat level due on 5/3  Promacta on hold for increasing liver disfunction  Monitor CBC/Lytes and transfuse/replete PRN  Keep PLT> 50k for retinal hemorrhage: 1 bag PLT today  Strict Is and Os/Daily weights/Mouth Care  IVF 4/20 BM Bx consistent with Aplastic Anemia  Status post Horse ATG 40mg/kg/day daily x 4 days (4/23- 4/26)  Continue Prednisone (taper to continue through 5/17) and CSA   CSA level today 95. Dose increased to 600mh PO q 12 and repeat level due on 5/3  Promacta on hold for increasing liver disfunction  Monitor CBC/Lytes and transfuse/replete PRN  Keep PLT> 50k for retinal hemorrhage  Strict Is and Os/Daily weights/Mouth Care  IVF

## 2020-05-02 NOTE — PROGRESS NOTE ADULT - PROBLEM SELECTOR PLAN 3
Grade 3 transaminitis with hyperbilirubinemia, increased  Promacta on hold as of today. Will re-evaluate when to restart Grade 3 transaminitis with hyperbilirubinemia, stable from last CMP  Promacta on hold as of 5/1. Will re-evaluate when to restart

## 2020-05-03 LAB
ALBUMIN SERPL ELPH-MCNC: 3.6 G/DL — SIGNIFICANT CHANGE UP (ref 3.3–5)
ALP SERPL-CCNC: 100 U/L — SIGNIFICANT CHANGE UP (ref 40–120)
ALT FLD-CCNC: 660 U/L — HIGH (ref 10–45)
ANION GAP SERPL CALC-SCNC: 13 MMOL/L — SIGNIFICANT CHANGE UP (ref 5–17)
AST SERPL-CCNC: 149 U/L — HIGH (ref 10–40)
BASOPHILS # BLD AUTO: 0 K/UL — SIGNIFICANT CHANGE UP (ref 0–0.2)
BASOPHILS NFR BLD AUTO: 0 % — SIGNIFICANT CHANGE UP (ref 0–2)
BILIRUB SERPL-MCNC: 2.2 MG/DL — HIGH (ref 0.2–1.2)
BUN SERPL-MCNC: 22 MG/DL — SIGNIFICANT CHANGE UP (ref 7–23)
CALCIUM SERPL-MCNC: 8.8 MG/DL — SIGNIFICANT CHANGE UP (ref 8.4–10.5)
CHLORIDE SERPL-SCNC: 100 MMOL/L — SIGNIFICANT CHANGE UP (ref 96–108)
CO2 SERPL-SCNC: 25 MMOL/L — SIGNIFICANT CHANGE UP (ref 22–31)
CREAT SERPL-MCNC: 0.83 MG/DL — SIGNIFICANT CHANGE UP (ref 0.5–1.3)
CYCLOSPORINE SER-MCNC: 123 NG/ML — LOW (ref 150–400)
EOSINOPHIL # BLD AUTO: 0.01 K/UL — SIGNIFICANT CHANGE UP (ref 0–0.5)
EOSINOPHIL NFR BLD AUTO: 0.9 % — SIGNIFICANT CHANGE UP (ref 0–6)
GLUCOSE SERPL-MCNC: 113 MG/DL — HIGH (ref 70–99)
HCT VFR BLD CALC: 21.1 % — LOW (ref 39–50)
HGB BLD-MCNC: 7.4 G/DL — LOW (ref 13–17)
LDH SERPL L TO P-CCNC: 259 U/L — HIGH (ref 50–242)
LYMPHOCYTES # BLD AUTO: 0.89 K/UL — LOW (ref 1–3.3)
LYMPHOCYTES # BLD AUTO: 63.7 % — HIGH (ref 13–44)
MAGNESIUM SERPL-MCNC: 1.8 MG/DL — SIGNIFICANT CHANGE UP (ref 1.6–2.6)
MCHC RBC-ENTMCNC: 29.5 PG — SIGNIFICANT CHANGE UP (ref 27–34)
MCHC RBC-ENTMCNC: 35.1 GM/DL — SIGNIFICANT CHANGE UP (ref 32–36)
MCV RBC AUTO: 84.1 FL — SIGNIFICANT CHANGE UP (ref 80–100)
MONOCYTES # BLD AUTO: 0.05 K/UL — SIGNIFICANT CHANGE UP (ref 0–0.9)
MONOCYTES NFR BLD AUTO: 3.5 % — SIGNIFICANT CHANGE UP (ref 2–14)
NEUTROPHILS # BLD AUTO: 0.37 K/UL — LOW (ref 1.8–7.4)
NEUTROPHILS NFR BLD AUTO: 26.6 % — LOW (ref 43–77)
PHOSPHATE SERPL-MCNC: 3.7 MG/DL — SIGNIFICANT CHANGE UP (ref 2.5–4.5)
PLATELET # BLD AUTO: 52 K/UL — LOW (ref 150–400)
POTASSIUM SERPL-MCNC: 3.7 MMOL/L — SIGNIFICANT CHANGE UP (ref 3.5–5.3)
POTASSIUM SERPL-SCNC: 3.7 MMOL/L — SIGNIFICANT CHANGE UP (ref 3.5–5.3)
PROT SERPL-MCNC: 6.3 G/DL — SIGNIFICANT CHANGE UP (ref 6–8.3)
RBC # BLD: 2.51 M/UL — LOW (ref 4.2–5.8)
RBC # FLD: 12.4 % — SIGNIFICANT CHANGE UP (ref 10.3–14.5)
SODIUM SERPL-SCNC: 138 MMOL/L — SIGNIFICANT CHANGE UP (ref 135–145)
URATE SERPL-MCNC: 2.8 MG/DL — LOW (ref 3.4–8.8)
WBC # BLD: 1.39 K/UL — LOW (ref 3.8–10.5)
WBC # FLD AUTO: 1.39 K/UL — LOW (ref 3.8–10.5)

## 2020-05-03 PROCEDURE — 99232 SBSQ HOSP IP/OBS MODERATE 35: CPT

## 2020-05-03 RX ADMIN — Medication 80 MILLIGRAM(S): at 05:30

## 2020-05-03 RX ADMIN — Medication 1 GRAM(S): at 17:13

## 2020-05-03 RX ADMIN — Medication 1 GRAM(S): at 13:40

## 2020-05-03 RX ADMIN — FAMOTIDINE 20 MILLIGRAM(S): 10 INJECTION INTRAVENOUS at 05:30

## 2020-05-03 RX ADMIN — ATOVAQUONE 750 MILLIGRAM(S): 750 SUSPENSION ORAL at 17:13

## 2020-05-03 RX ADMIN — Medication 5 MILLILITER(S): at 13:41

## 2020-05-03 RX ADMIN — ATOVAQUONE 750 MILLIGRAM(S): 750 SUSPENSION ORAL at 05:31

## 2020-05-03 RX ADMIN — Medication 5 MILLILITER(S): at 23:22

## 2020-05-03 RX ADMIN — FAMOTIDINE 20 MILLIGRAM(S): 10 INJECTION INTRAVENOUS at 17:13

## 2020-05-03 RX ADMIN — Medication 1 GRAM(S): at 05:29

## 2020-05-03 RX ADMIN — Medication 400 MILLIGRAM(S): at 05:30

## 2020-05-03 RX ADMIN — Medication 400 MILLIGRAM(S): at 21:32

## 2020-05-03 RX ADMIN — Medication 1 GRAM(S): at 23:22

## 2020-05-03 RX ADMIN — Medication 400 MILLIGRAM(S): at 13:41

## 2020-05-03 RX ADMIN — CYCLOSPORINE 600 MILLIGRAM(S): 100 CAPSULE ORAL at 05:30

## 2020-05-03 RX ADMIN — CYCLOSPORINE 600 MILLIGRAM(S): 100 CAPSULE ORAL at 17:14

## 2020-05-03 RX ADMIN — CASPOFUNGIN ACETATE 260 MILLIGRAM(S): 7 INJECTION, POWDER, LYOPHILIZED, FOR SOLUTION INTRAVENOUS at 07:53

## 2020-05-03 RX ADMIN — Medication 5 MILLILITER(S): at 07:53

## 2020-05-03 RX ADMIN — Medication 5 MILLILITER(S): at 20:22

## 2020-05-03 NOTE — PROGRESS NOTE ADULT - PROBLEM SELECTOR PLAN 1
4/20 BM Bx consistent with Aplastic Anemia  Status post Horse ATG 40mg/kg/day daily x 4 days (4/23- 4/26)  Continue Prednisone (taper to continue through 5/17) and CSA   CSA level today 95. Dose increased to 600mh PO q 12 and repeat level due on 5/3  Promacta on hold for increasing liver disfunction  Monitor CBC/Lytes and transfuse/replete PRN  Keep PLT> 50k for retinal hemorrhage  Strict Is and Os/Daily weights/Mouth Care  IVF 4/20 BM Bx consistent with Aplastic Anemia  Status post Horse ATG 40mg/kg/day daily x 4 days (4/23- 4/26)  Continue Prednisone (taper to continue through 5/17) and CSA   CSA level today 123. will continue same dose,  increased to 600mg PO q12 on 4/30  Promacta on hold for increasing liver disfunction  Monitor CBC/Lytes and transfuse/replete PRN  Keep PLT> 50k for retinal hemorrhage  Strict Is and Os/Daily weights/Mouth Care  IVF

## 2020-05-03 NOTE — PROGRESS NOTE ADULT - SUBJECTIVE AND OBJECTIVE BOX
Diagnosis: Aplastic anemia    Protocol/Chemo Regimen: ATG/Cyclosporine/Prednisone (Previously received Solumedrol Days 1-4) Promacta on hold as of today for Grade 3 transaminitis    Day: 11    Pt endorsed: indigestion in the morning    Review of Systems: Patient denies headache, dizziness, visual changes, chest pain, palpitations, SOB, abdominal pain, nausea, vomiting, diarrhea or dysuria.    Pain scale: 0    Diet: Regular    Allergies: No Known Allergies      ANTIMICROBIALS  acyclovir   Oral Tab/Cap 400 milliGRAM(s) Oral every 8 hours  atovaquone Suspension 750 milliGRAM(s) Oral every 12 hours      caspofungin IVPB 50 milliGRAM(s) IV Intermittent every 24 hours  levoFLOXacin  Tablet 500 milliGRAM(s) Oral every 24 hours      STANDING MEDICATIONS  antithymocyte globulin equine Skin Test 0.1 milliLiter(s) IntraDermal once  Biotene Dry Mouth Oral Rinse 5 milliLiter(s) Swish and Spit five times a day  cycloSPORINE  (SandIMMUNE)  Solution 600 milliGRAM(s) Oral every 12 hours  famotidine    Tablet 20 milliGRAM(s) Oral two times a day  predniSONE   Tablet 80 milliGRAM(s) Oral daily  sodium chloride 0.9%. 1000 milliLiter(s) IV Continuous <Continuous>  sucralfate suspension 1 Gram(s) Oral every 6 hours      PRN MEDICATIONS  acetaminophen   Tablet .. 650 milliGRAM(s) Oral every 6 hours PRN  acetaminophen   Tablet .. 650 milliGRAM(s) Oral once PRN  aluminum hydroxide/magnesium hydroxide/simethicone Suspension 30 milliLiter(s) Oral every 4 hours PRN  EPINEPHrine     1 mG/mL Injectable 0.3 milliGRAM(s) IntraMuscular once PRN  methylPREDNISolone sodium succinate IVPB 500 milliGRAM(s) IV Intermittent once PRN  ondansetron Injectable 4 milliGRAM(s) IV Push every 6 hours PRN      Vital Signs Last 24 Hrs  T(C): 36.4 (03 May 2020 05:28), Max: 37.2 (02 May 2020 13:10)  T(F): 97.6 (03 May 2020 05:28), Max: 99 (02 May 2020 13:10)  HR: 76 (03 May 2020 05:28) (60 - 76)  BP: 134/79 (03 May 2020 05:28) (122/78 - 150/83)  RR: 18 (03 May 2020 05:28) (18 - 18)  SpO2: 98% (03 May 2020 05:28) (96% - 98%)    PHYSICAL EXAM  General: adult in NAD  HEENT: clear oropharynx, anicteric sclera, pink conjunctiva  Neck: supple  CV: normal S1/S2 RRR  Lungs: positive air movement b/l ant lungs,clear to auscultation, no wheezes, no rales  Abdomen: soft non-tender non-distended, no hepatosplenomegaly  Ext: no clubbing cyanosis or edema  Skin: no rashes and no petechiae  Neuro: alert and oriented X 4, no focal deficits  Central Line: normal    LABS:    Blood Cultures:                           7.5    1.32  )-----------( 69       ( 02 May 2020 05:58 )             20.6         Mean Cell Volume : 82.7 fl  Mean Cell Hemoglobin : 30.1 pg  Mean Cell Hemoglobin Concentration : 36.4 gm/dL  Auto Neutrophil # : 0.30 K/uL  Auto Lymphocyte # : 0.95 K/uL  Auto Monocyte # : 0.05 K/uL  Auto Eosinophil # : 0.00 K/uL  Auto Basophil # : 0.00 K/uL  Auto Neutrophil % : 22.9 %  Auto Lymphocyte % : 71.6 %  Auto Monocyte % : 3.7 %  Auto Eosinophil % : 0.0 %  Auto Basophil % : 0.0 %      05-02    139  |  101  |  24<H>  ----------------------------<  117<H>  3.1<L>   |  26  |  0.78    Ca    8.8      02 May 2020 05:58  Phos  3.3     05-02  Mg     1.7     05-02    TPro  6.2  /  Alb  3.5  /  TBili  2.1<H>  /  DBili  x   /  AST  138<H>  /  ALT  555<H>  /  AlkPhos  90  05-02          PT/INR - ( 01 May 2020 09:00 )   PT: 12.3 sec;   INR: 1.07 ratio         PTT - ( 01 May 2020 09:00 )  PTT:22.6 sec        RADIOLOGY & ADDITIONAL STUDIES: Diagnosis: Aplastic anemia    Protocol/Chemo Regimen: ATG/Cyclosporine/Prednisone (Previously received Solumedrol Days 1-4) Promacta on hold as of today for Grade 3 transaminitis    Day: 11    Pt endorsed: abdominal discomfort improved    Review of Systems: Patient denies headache, dizziness, visual changes, chest pain, palpitations, SOB, abdominal pain, nausea, vomiting, diarrhea or dysuria.    Pain scale: 0    Diet: Regular    Allergies: No Known Allergies      ANTIMICROBIALS  acyclovir   Oral Tab/Cap 400 milliGRAM(s) Oral every 8 hours  atovaquone Suspension 750 milliGRAM(s) Oral every 12 hours      caspofungin IVPB 50 milliGRAM(s) IV Intermittent every 24 hours  levoFLOXacin  Tablet 500 milliGRAM(s) Oral every 24 hours      STANDING MEDICATIONS  antithymocyte globulin equine Skin Test 0.1 milliLiter(s) IntraDermal once  Biotene Dry Mouth Oral Rinse 5 milliLiter(s) Swish and Spit five times a day  cycloSPORINE  (SandIMMUNE)  Solution 600 milliGRAM(s) Oral every 12 hours  famotidine    Tablet 20 milliGRAM(s) Oral two times a day  predniSONE   Tablet 80 milliGRAM(s) Oral daily  sodium chloride 0.9%. 1000 milliLiter(s) IV Continuous <Continuous>  sucralfate suspension 1 Gram(s) Oral every 6 hours      PRN MEDICATIONS  acetaminophen   Tablet .. 650 milliGRAM(s) Oral every 6 hours PRN  acetaminophen   Tablet .. 650 milliGRAM(s) Oral once PRN  aluminum hydroxide/magnesium hydroxide/simethicone Suspension 30 milliLiter(s) Oral every 4 hours PRN  EPINEPHrine     1 mG/mL Injectable 0.3 milliGRAM(s) IntraMuscular once PRN  methylPREDNISolone sodium succinate IVPB 500 milliGRAM(s) IV Intermittent once PRN  ondansetron Injectable 4 milliGRAM(s) IV Push every 6 hours PRN      Vital Signs Last 24 Hrs  T(C): 36.4 (03 May 2020 05:28), Max: 37.2 (02 May 2020 13:10)  T(F): 97.6 (03 May 2020 05:28), Max: 99 (02 May 2020 13:10)  HR: 76 (03 May 2020 05:28) (60 - 76)  BP: 134/79 (03 May 2020 05:28) (122/78 - 150/83)  RR: 18 (03 May 2020 05:28) (18 - 18)  SpO2: 98% (03 May 2020 05:28) (96% - 98%)      PHYSICAL EXAM  General: NAD  HEENT: PERRLA, EOMI, clear oropharynx  CV: (+) S1/S2 RRR  Lungs: clear to auscultation, no wheezes or rales  Abdomen: soft, non-tender, non-distended (+) BS  Ext: no edema  Skin: no rashes   Neuro: alert and oriented X 3, no focal deficits  PIV: C/D/I     Cultures:     Culture - Stool (04.26.20 @ 04:51)    Specimen Source: .Stool Feces    Culture Results:   No enteric pathogens isolated.  (Stool culture examined for Salmonella,  Shigella, Campylobacter, Aeromonas, Plesiomonas,  Vibrio, E.coli O157 and Yersinia)    Culture - Stool (04.26.20 @ 04:51)    Specimen Source: .Stool Feces    Culture Results:   No enteric pathogens isolated.  (Stool culture examined for Salmonella,  Shigella, Campylobacter, Aeromonas, Plesiomonas,  Vibrio, E.coli O157 and Yersinia)    Culture - Urine (09.24.15 @ 19:14)    Specimen Source: .Urine Clean Catch (Midstream)    Culture Results:   No growth      LABS:                                 7.4    1.39  )-----------( 52       ( 03 May 2020 07:13 )             21.1     03 May 2020 07:13    138    |  100    |  22     ----------------------------<  113    3.7     |  25     |  0.83     Ca    8.8        03 May 2020 07:13  Phos  3.7       03 May 2020 07:13  Mg     1.8       03 May 2020 07:13    TPro  6.3    /  Alb  3.6    /  TBili  2.2    /  DBili  x      /  AST  149    /  ALT  660    /  AlkPhos  100    03 May 2020 07:13    LIVER FUNCTIONS - ( 03 May 2020 07:13 )  Alb: 3.6 g/dL / Pro: 6.3 g/dL / ALK PHOS: 100 U/L / ALT: 660 U/L / AST: 149 U/L / GGT: x             RADIOLOGY & ADDITIONAL STUDIES:    from: CT Head No Cont (04.17.20 @ 16:11)     IMPRESSION:   1.  No acute intracranial hemorrhage or mass effect.   2.  Paranasal sinusitis.

## 2020-05-03 NOTE — PROGRESS NOTE ADULT - PROBLEM SELECTOR PLAN 3
Grade 3 transaminitis with hyperbilirubinemia, stable from last CMP  Promacta on hold as of 5/1. Will re-evaluate when to restart Grade 3 transaminitis with hyperbilirubinemia, still rising  Posaconazole changed to caspofungin  Promacta on hold as of 5/1. Will re-evaluate when to restart  US Abdomen ordered- follow up results

## 2020-05-03 NOTE — PROGRESS NOTE ADULT - ATTENDING COMMENTS
34M with no PMH presented to OSH with dizziness x 2 weeks, easy bruising x 2-4 weeks. At home had a syncopal event and called 911, found to have hemoglobin 3s, Platelets 2K, no evidence of DIC. Noted mild petechiae and has had gum bleeding. These have improved. Noted dark stools for 2 weeks. Peripheral smear with immature forms concerning for acute leukemia vs. aplastic anemia   -peripheral flow without blast population, FISH pending   -received ATRA 50 mg BID until PML-[employer] negative   -Bone marrow evaluation(4/20)c/w severe aplastic anemia. Treatment plan: hATG, Cyclosporine, Eltrombopag.   - ATG test dose negative. Begin ATG, CSA, Eltrombopag on 4/23  -patient developed hives and short episode of wheezing with ATG infusion, which resolved- will continue Solumedrol pre-med with ATG(hold Prednisone), then resume Prednisone on day 5 to complete recommended course for prevention of serum sickness.  - IVF, mouth care; discontinued Allopurinol  -receiving transfusions if Hgb< 7, Plt <50k   - s/p vit K for elevated INR  -optho consult given hazy vision, suspect retinal bleed given low platelets. Platelet transfusional support for goal >50k.   -monitor for s/s of bleeding, gum bleeding intermittent   -Paranasal sinusitis on CT head(4/17)- cont Levaquin, begin Posaconazole; on Mepron and Acyclovir with immunosuppressive therapy.  - d10 of treatment, cont transfusion support prn  -LFts increasing, had switched posaconazole to caspofungin on 4/29 and now holding promacta. LFTs plateauing now. Bili downtrending 5/2.   -some abdominal discomfort, likely gastritis type symptoms due to steroid, increased pepcid to BID yesterday and added carafate.  -Carafate helped significantly with abdominal symptoms. 34M with no PMH presented to OSH with dizziness x 2 weeks, easy bruising x 2-4 weeks. At home had a syncopal event and called 911, found to have hemoglobin 3s, Platelets 2K, no evidence of DIC. Noted mild petechiae and has had gum bleeding. These have improved. Noted dark stools for 2 weeks. Peripheral smear with immature forms concerning for acute leukemia vs. aplastic anemia   -peripheral flow without blast population, FISH pending   -received ATRA 50 mg BID until PML-[employer] negative   -Bone marrow evaluation(4/20)c/w severe aplastic anemia. Treatment plan: hATG, Cyclosporine, Eltrombopag.   - ATG test dose negative. Begin ATG, CSA, Eltrombopag on 4/23  -patient developed hives and short episode of wheezing with ATG infusion, which resolved- will continue Solumedrol pre-med with ATG(hold Prednisone), then resume Prednisone on day 5 to complete recommended course for prevention of serum sickness.  - IVF, mouth care; discontinued Allopurinol  -receiving transfusions if Hgb< 7, Plt <50k   - s/p vit K for elevated INR  -optho consult given hazy vision, suspect retinal bleed given low platelets. Platelet transfusional support for goal >50k.   -monitor for s/s of bleeding, gum bleeding intermittent   -Paranasal sinusitis on CT head(4/17)- cont Levaquin, begin Posaconazole; on Mepron and Acyclovir with immunosuppressive therapy.  - d11 of treatment, cont transfusion support prn  -LFts increasing, had switched posaconazole to caspofungin on 4/29 and when still uptrending subsequently held promacta. LFTs plateauing now. Bili downtrending 5/2-3. ALT still uptrending. Ordered abdominal sono on 5/3.    -some abdominal discomfort, likely gastritis type symptoms due to steroid, increased pepcid to BID and added carafate.  -Carafate helped significantly with abdominal symptoms, which are now mostly resolved.   -Cyclosporine level 123 on Beltran 5/3, however he is on a high dose and liver function unsteady. Will recheck Tuesday 5/5 and if still low may need to increase dose further.

## 2020-05-04 LAB
ALBUMIN SERPL ELPH-MCNC: 3.3 G/DL — SIGNIFICANT CHANGE UP (ref 3.3–5)
ALP SERPL-CCNC: 90 U/L — SIGNIFICANT CHANGE UP (ref 40–120)
ALT FLD-CCNC: 570 U/L — HIGH (ref 10–45)
ANION GAP SERPL CALC-SCNC: 12 MMOL/L — SIGNIFICANT CHANGE UP (ref 5–17)
APTT BLD: 22.8 SEC — LOW (ref 27.5–36.3)
AST SERPL-CCNC: 101 U/L — HIGH (ref 10–40)
BASOPHILS # BLD AUTO: 0 K/UL — SIGNIFICANT CHANGE UP (ref 0–0.2)
BASOPHILS NFR BLD AUTO: 0 % — SIGNIFICANT CHANGE UP (ref 0–2)
BILIRUB SERPL-MCNC: 2.2 MG/DL — HIGH (ref 0.2–1.2)
BLD GP AB SCN SERPL QL: NEGATIVE — SIGNIFICANT CHANGE UP
BUN SERPL-MCNC: 20 MG/DL — SIGNIFICANT CHANGE UP (ref 7–23)
CALCIUM SERPL-MCNC: 8.5 MG/DL — SIGNIFICANT CHANGE UP (ref 8.4–10.5)
CHLORIDE SERPL-SCNC: 100 MMOL/L — SIGNIFICANT CHANGE UP (ref 96–108)
CO2 SERPL-SCNC: 24 MMOL/L — SIGNIFICANT CHANGE UP (ref 22–31)
CREAT SERPL-MCNC: 0.75 MG/DL — SIGNIFICANT CHANGE UP (ref 0.5–1.3)
EOSINOPHIL # BLD AUTO: 0.01 K/UL — SIGNIFICANT CHANGE UP (ref 0–0.5)
EOSINOPHIL NFR BLD AUTO: 0.9 % — SIGNIFICANT CHANGE UP (ref 0–6)
FIBRINOGEN PPP-MCNC: 436 MG/DL — SIGNIFICANT CHANGE UP (ref 350–510)
GLUCOSE SERPL-MCNC: 102 MG/DL — HIGH (ref 70–99)
HCT VFR BLD CALC: 18.7 % — CRITICAL LOW (ref 39–50)
HGB BLD-MCNC: 6.7 G/DL — CRITICAL LOW (ref 13–17)
INR BLD: 1.01 RATIO — SIGNIFICANT CHANGE UP (ref 0.88–1.16)
LDH SERPL L TO P-CCNC: 186 U/L — SIGNIFICANT CHANGE UP (ref 50–242)
LYMPHOCYTES # BLD AUTO: 0.99 K/UL — LOW (ref 1–3.3)
LYMPHOCYTES # BLD AUTO: 75 % — HIGH (ref 13–44)
MAGNESIUM SERPL-MCNC: 1.6 MG/DL — SIGNIFICANT CHANGE UP (ref 1.6–2.6)
MCHC RBC-ENTMCNC: 30 PG — SIGNIFICANT CHANGE UP (ref 27–34)
MCHC RBC-ENTMCNC: 35.8 GM/DL — SIGNIFICANT CHANGE UP (ref 32–36)
MCV RBC AUTO: 83.9 FL — SIGNIFICANT CHANGE UP (ref 80–100)
MONOCYTES # BLD AUTO: 0.05 K/UL — SIGNIFICANT CHANGE UP (ref 0–0.9)
MONOCYTES NFR BLD AUTO: 3.7 % — SIGNIFICANT CHANGE UP (ref 2–14)
NEUTROPHILS # BLD AUTO: 0.27 K/UL — LOW (ref 1.8–7.4)
NEUTROPHILS NFR BLD AUTO: 19.5 % — LOW (ref 43–77)
PHOSPHATE SERPL-MCNC: 4.2 MG/DL — SIGNIFICANT CHANGE UP (ref 2.5–4.5)
PLATELET # BLD AUTO: 31 K/UL — LOW (ref 150–400)
POTASSIUM SERPL-MCNC: 3.9 MMOL/L — SIGNIFICANT CHANGE UP (ref 3.5–5.3)
POTASSIUM SERPL-SCNC: 3.9 MMOL/L — SIGNIFICANT CHANGE UP (ref 3.5–5.3)
PROT SERPL-MCNC: 5.9 G/DL — LOW (ref 6–8.3)
PROTHROM AB SERPL-ACNC: 11.5 SEC — SIGNIFICANT CHANGE UP (ref 10–12.9)
RBC # BLD: 2.23 M/UL — LOW (ref 4.2–5.8)
RBC # FLD: 12.5 % — SIGNIFICANT CHANGE UP (ref 10.3–14.5)
RH IG SCN BLD-IMP: POSITIVE — SIGNIFICANT CHANGE UP
SODIUM SERPL-SCNC: 136 MMOL/L — SIGNIFICANT CHANGE UP (ref 135–145)
URATE SERPL-MCNC: 2.6 MG/DL — LOW (ref 3.4–8.8)
WBC # BLD: 1.32 K/UL — LOW (ref 3.8–10.5)
WBC # FLD AUTO: 1.32 K/UL — LOW (ref 3.8–10.5)

## 2020-05-04 PROCEDURE — 99232 SBSQ HOSP IP/OBS MODERATE 35: CPT | Mod: GC

## 2020-05-04 PROCEDURE — 76705 ECHO EXAM OF ABDOMEN: CPT | Mod: 26,RT

## 2020-05-04 RX ADMIN — Medication 5 MILLILITER(S): at 12:31

## 2020-05-04 RX ADMIN — CYCLOSPORINE 600 MILLIGRAM(S): 100 CAPSULE ORAL at 05:48

## 2020-05-04 RX ADMIN — ATOVAQUONE 750 MILLIGRAM(S): 750 SUSPENSION ORAL at 05:48

## 2020-05-04 RX ADMIN — Medication 400 MILLIGRAM(S): at 05:48

## 2020-05-04 RX ADMIN — CASPOFUNGIN ACETATE 260 MILLIGRAM(S): 7 INJECTION, POWDER, LYOPHILIZED, FOR SOLUTION INTRAVENOUS at 07:32

## 2020-05-04 RX ADMIN — Medication 5 MILLILITER(S): at 20:23

## 2020-05-04 RX ADMIN — FAMOTIDINE 20 MILLIGRAM(S): 10 INJECTION INTRAVENOUS at 05:49

## 2020-05-04 RX ADMIN — Medication 80 MILLIGRAM(S): at 05:48

## 2020-05-04 RX ADMIN — Medication 400 MILLIGRAM(S): at 23:28

## 2020-05-04 RX ADMIN — Medication 5 MILLILITER(S): at 23:26

## 2020-05-04 RX ADMIN — Medication 1 GRAM(S): at 14:01

## 2020-05-04 RX ADMIN — Medication 400 MILLIGRAM(S): at 14:01

## 2020-05-04 RX ADMIN — Medication 1 GRAM(S): at 17:27

## 2020-05-04 RX ADMIN — CYCLOSPORINE 600 MILLIGRAM(S): 100 CAPSULE ORAL at 17:27

## 2020-05-04 RX ADMIN — FAMOTIDINE 20 MILLIGRAM(S): 10 INJECTION INTRAVENOUS at 17:27

## 2020-05-04 RX ADMIN — ATOVAQUONE 750 MILLIGRAM(S): 750 SUSPENSION ORAL at 17:27

## 2020-05-04 RX ADMIN — Medication 1 GRAM(S): at 23:28

## 2020-05-04 RX ADMIN — Medication 1 GRAM(S): at 05:48

## 2020-05-04 RX ADMIN — Medication 5 MILLILITER(S): at 07:32

## 2020-05-04 RX ADMIN — Medication 5 MILLILITER(S): at 17:27

## 2020-05-04 NOTE — PROGRESS NOTE ADULT - PROBLEM SELECTOR PLAN 3
Grade 3 transaminitis with hyperbilirubinemia, still rising  Posaconazole changed to caspofungin  Promacta on hold as of 5/1. Will re-evaluate when to restart  s/p US Abdomen today- follow up results Grade 3 transaminitis with hyperbilirubinemia, improved today  Posaconazole changed to caspofungin  Promacta on hold as of 5/1. Will re-evaluate when to restart  s/p US Abdomen today- unremarkable

## 2020-05-04 NOTE — PROGRESS NOTE ADULT - ATTENDING COMMENTS
34M with no PMH presented to OSH with dizziness x 2 weeks, easy bruising x 2-4 weeks. At home had a syncopal event and called 911, found to have hemoglobin 3s, Platelets 2K, no evidence of DIC. Noted mild petechiae and has had gum bleeding. These have improved. Noted dark stools for 2 weeks. Peripheral smear with immature forms concerning for acute leukemia vs. aplastic anemia   -peripheral flow without blast population, FISH pending   -received ATRA 50 mg BID until PML-[employer] negative   -Bone marrow evaluation(4/20)c/w severe aplastic anemia. Treatment plan: hATG, Cyclosporine, Eltrombopag.   - ATG test dose negative. Begin ATG, CSA, Eltrombopag on 4/23  -patient developed hives and short episode of wheezing with ATG infusion, which resolved- will continue Solumedrol pre-med with ATG(hold Prednisone), then resume Prednisone on day 5 to complete recommended course for prevention of serum sickness.  - IVF, mouth care; discontinued Allopurinol  -receiving transfusions if Hgb< 7, Plt <50k   - s/p vit K for elevated INR  -optho consult given hazy vision, suspect retinal bleed given low platelets. Platelet transfusional support for goal >50k.   -monitor for s/s of bleeding, gum bleeding intermittent   -Paranasal sinusitis on CT head(4/17)- cont Levaquin, begin Posaconazole; on Mepron and Acyclovir with immunosuppressive therapy.  - d11 of treatment, cont transfusion support prn  -LFts increasing, had switched posaconazole to caspofungin on 4/29 and when still uptrending subsequently held promacta. LFTs plateauing now. Bili downtrending 5/2-3. ALT still uptrending. Ordered abdominal sono on 5/3.    -some abdominal discomfort, likely gastritis type symptoms due to steroid, increased pepcid to BID and added carafate.  -Carafate helped significantly with abdominal symptoms, which are now mostly resolved.   -Cyclosporine level 123 on Beltran 5/3, however he is on a high dose and liver function unsteady. Will recheck Tuesday 5/5 and if still low may need to increase dose further. 34M with no PMH presented to OSH with dizziness x 2 weeks, easy bruising x 2-4 weeks. At home had a syncopal event and called 911, found to have hemoglobin 3s, Platelets 2K, no evidence of DIC. Noted mild petechiae and has had gum bleeding. These have improved. Noted dark stools for 2 weeks. Peripheral smear with immature forms concerning for acute leukemia vs. aplastic anemia   -peripheral flow without blast population, FISH pending   -received ATRA 50 mg BID until PML-[employer] negative   -Bone marrow evaluation(4/20) c/w severe aplastic anemia. Treatment plan: hATG, Cyclosporine, Eltrombopag.   - ATG test dose negative. Begin ATG, CSA, Eltrombopag on 4/23, today is day 12  -patient developed hives and short episode of wheezing with ATG infusion, which resolved- will continue Solumedrol pre-med with ATG(hold Prednisone), then resume Prednisone on day 5 to complete recommended course for prevention of serum sickness.  - IVF, mouth care; discontinued Allopurinol  -receiving transfusions if Hgb< 7, Plt <50k   - s/p vit K for elevated INR  -optho consult given hazy vision, suspect retinal bleed given low platelets. Platelet transfusional support for goal >50k.   -monitor for s/s of bleeding, gum bleeding intermittent   -Paranasal sinusitis on CT head(4/17)- cont Levaquin, begin Posaconazole; on Mepron and Acyclovir with immunosuppressive therapy.  - d11 of treatment, cont transfusion support prn  -LFTs increasing, had switched posaconazole to caspofungin on 4/29 and when still uptrending subsequently held promacta on 5/1. Ordered abdominal sono on 5/3 - normal.  LFTs now improving  -some abdominal discomfort, likely gastritis type symptoms due to steroid, increased pepcid to BID and added carafate.  -Carafate helped significantly with abdominal symptoms, which are now mostly resolved.   -Cyclosporine level 123 on Beltran 5/3, however he is on a high dose and liver function unsteady. Will recheck Tuesday 5/5 and if still low may need to increase dose further.

## 2020-05-04 NOTE — PROGRESS NOTE ADULT - SUBJECTIVE AND OBJECTIVE BOX
Diagnosis: Aplastic anemia    Protocol/Chemo Regimen: ATG/Cyclosporine/Prednisone (Previously received Solumedrol Days 1-4) Promacta on hold as of today for Grade 3 transaminitis    Day: 12    Pt endorsed: abdominal discomfort improved    Review of Systems: Patient denies headache, dizziness, visual changes, chest pain, palpitations, SOB, abdominal pain, nausea, vomiting, diarrhea or dysuria.    Pain scale: 0    Diet: Regular    Allergies: No Known Allergies    ANTIMICROBIALS  acyclovir   Oral Tab/Cap 400 milliGRAM(s) Oral every 8 hours  atovaquone Suspension 750 milliGRAM(s) Oral every 12 hours    caspofungin IVPB 50 milliGRAM(s) IV Intermittent every 24 hours  levoFLOXacin  Tablet 500 milliGRAM(s) Oral every 24 hours      STANDING MEDICATIONS  antithymocyte globulin equine Skin Test 0.1 milliLiter(s) IntraDermal once  Biotene Dry Mouth Oral Rinse 5 milliLiter(s) Swish and Spit five times a day  cycloSPORINE  (SandIMMUNE)  Solution 600 milliGRAM(s) Oral every 12 hours  famotidine    Tablet 20 milliGRAM(s) Oral two times a day  predniSONE   Tablet 80 milliGRAM(s) Oral daily  sodium chloride 0.9%. 1000 milliLiter(s) IV Continuous <Continuous>  sucralfate suspension 1 Gram(s) Oral every 6 hours      PRN MEDICATIONS  acetaminophen   Tablet .. 650 milliGRAM(s) Oral every 6 hours PRN  acetaminophen   Tablet .. 650 milliGRAM(s) Oral once PRN  aluminum hydroxide/magnesium hydroxide/simethicone Suspension 30 milliLiter(s) Oral every 4 hours PRN  EPINEPHrine     1 mG/mL Injectable 0.3 milliGRAM(s) IntraMuscular once PRN  methylPREDNISolone sodium succinate IVPB 500 milliGRAM(s) IV Intermittent once PRN  ondansetron Injectable 4 milliGRAM(s) IV Push every 6 hours PRN      Vital Signs Last 24 Hrs  T(C): 36.1 (04 May 2020 05:16), Max: 37.4 (03 May 2020 17:25)  T(F): 97 (04 May 2020 05:16), Max: 99.3 (03 May 2020 17:25)  HR: 70 (04 May 2020 05:16) (70 - 100)  BP: 120/79 (04 May 2020 05:16) (120/79 - 134/78)  RR: 18 (04 May 2020 05:16) (18 - 18)  SpO2: 98% (04 May 2020 05:16) (96% - 98%)    PHYSICAL EXAM  General: NAD  HEENT: PERRLA, EOMI, clear oropharynx  CV: (+) S1/S2 RRR  Lungs: clear to auscultation, no wheezes or rales  Abdomen: soft, non-tender, non-distended (+) BS  Ext: no edema  Skin: no rashes   Neuro: alert and oriented X 3, no focal deficits  PIV: C/D/I     Cultures:     Culture - Stool (04.26.20 @ 04:51)    Specimen Source: .Stool Feces    Culture Results:   No enteric pathogens isolated.  (Stool culture examined for Salmonella,  Shigella, Campylobacter, Aeromonas, Plesiomonas,  Vibrio, E.coli O157 and Yersinia)    Culture - Stool (04.26.20 @ 04:51)    Specimen Source: .Stool Feces    Culture Results:   No enteric pathogens isolated.  (Stool culture examined for Salmonella,  Shigella, Campylobacter, Aeromonas, Plesiomonas,  Vibrio, E.coli O157 and Yersinia)    Culture - Urine (09.24.15 @ 19:14)    Specimen Source: .Urine Clean Catch (Midstream)    Culture Results:   No growth    LABS:                        6.7    1.32  )-----------( 31       ( 04 May 2020 06:49 )             18.7         Mean Cell Volume : 83.9 fl  Mean Cell Hemoglobin : 30.0 pg  Mean Cell Hemoglobin Concentration : 35.8 gm/dL  Auto Neutrophil # : 0.27 K/uL  Auto Lymphocyte # : 0.99 K/uL  Auto Monocyte # : 0.05 K/uL  Auto Eosinophil # : 0.01 K/uL  Auto Basophil # : 0.00 K/uL  Auto Neutrophil % : 19.5 %  Auto Lymphocyte % : 75.0 %  Auto Monocyte % : 3.7 %  Auto Eosinophil % : 0.9 %  Auto Basophil % : 0.0 %      05-04    136  |  100  |  20  ----------------------------<  102<H>  3.9   |  24  |  0.75    Ca    8.5      04 May 2020 06:49  Phos  4.2     05-04  Mg     1.6     05-04    TPro  5.9<L>  /  Alb  3.3  /  TBili  2.2<H>  /  DBili  x   /  AST  101<H>  /  ALT  570<H>  /  AlkPhos  90  05-04    PT/INR - ( 04 May 2020 06:49 )   PT: 11.5 sec;   INR: 1.01 ratio    PTT - ( 04 May 2020 06:49 )  PTT:22.8 sec      Uric Acid 2.6      RADIOLOGY & ADDITIONAL STUDIES:    from: CT Head No Cont (04.17.20 @ 16:11)  IMPRESSION:   1.  No acute intracranial hemorrhage or mass effect.   2.  Paranasal sinusitis.

## 2020-05-05 LAB
ALBUMIN SERPL ELPH-MCNC: 3.3 G/DL — SIGNIFICANT CHANGE UP (ref 3.3–5)
ALP SERPL-CCNC: 104 U/L — SIGNIFICANT CHANGE UP (ref 40–120)
ALT FLD-CCNC: 556 U/L — HIGH (ref 10–45)
ANION GAP SERPL CALC-SCNC: 13 MMOL/L — SIGNIFICANT CHANGE UP (ref 5–17)
AST SERPL-CCNC: 100 U/L — HIGH (ref 10–40)
BASOPHILS # BLD AUTO: 0 K/UL — SIGNIFICANT CHANGE UP (ref 0–0.2)
BASOPHILS NFR BLD AUTO: 0 % — SIGNIFICANT CHANGE UP (ref 0–2)
BILIRUB SERPL-MCNC: 2.7 MG/DL — HIGH (ref 0.2–1.2)
BUN SERPL-MCNC: 20 MG/DL — SIGNIFICANT CHANGE UP (ref 7–23)
CALCIUM SERPL-MCNC: 8.8 MG/DL — SIGNIFICANT CHANGE UP (ref 8.4–10.5)
CHLORIDE SERPL-SCNC: 97 MMOL/L — SIGNIFICANT CHANGE UP (ref 96–108)
CO2 SERPL-SCNC: 26 MMOL/L — SIGNIFICANT CHANGE UP (ref 22–31)
CREAT SERPL-MCNC: 0.82 MG/DL — SIGNIFICANT CHANGE UP (ref 0.5–1.3)
CYCLOSPORINE SER-MCNC: 192 NG/ML — SIGNIFICANT CHANGE UP (ref 150–400)
EOSINOPHIL # BLD AUTO: 0 K/UL — SIGNIFICANT CHANGE UP (ref 0–0.5)
EOSINOPHIL NFR BLD AUTO: 0 % — SIGNIFICANT CHANGE UP (ref 0–6)
GLUCOSE SERPL-MCNC: 108 MG/DL — HIGH (ref 70–99)
HCT VFR BLD CALC: 22.8 % — LOW (ref 39–50)
HGB BLD-MCNC: 8.2 G/DL — LOW (ref 13–17)
LDH SERPL L TO P-CCNC: 205 U/L — SIGNIFICANT CHANGE UP (ref 50–242)
LYMPHOCYTES # BLD AUTO: 1.06 K/UL — SIGNIFICANT CHANGE UP (ref 1–3.3)
LYMPHOCYTES # BLD AUTO: 72.2 % — HIGH (ref 13–44)
MAGNESIUM SERPL-MCNC: 1.7 MG/DL — SIGNIFICANT CHANGE UP (ref 1.6–2.6)
MCHC RBC-ENTMCNC: 29.8 PG — SIGNIFICANT CHANGE UP (ref 27–34)
MCHC RBC-ENTMCNC: 36 GM/DL — SIGNIFICANT CHANGE UP (ref 32–36)
MCV RBC AUTO: 82.9 FL — SIGNIFICANT CHANGE UP (ref 80–100)
MONOCYTES # BLD AUTO: 0.1 K/UL — SIGNIFICANT CHANGE UP (ref 0–0.9)
MONOCYTES NFR BLD AUTO: 6.5 % — SIGNIFICANT CHANGE UP (ref 2–14)
NEUTROPHILS # BLD AUTO: 0.26 K/UL — LOW (ref 1.8–7.4)
NEUTROPHILS NFR BLD AUTO: 17.6 % — LOW (ref 43–77)
PHOSPHATE SERPL-MCNC: 4.9 MG/DL — HIGH (ref 2.5–4.5)
PLATELET # BLD AUTO: 58 K/UL — LOW (ref 150–400)
POTASSIUM SERPL-MCNC: 3.6 MMOL/L — SIGNIFICANT CHANGE UP (ref 3.5–5.3)
POTASSIUM SERPL-SCNC: 3.6 MMOL/L — SIGNIFICANT CHANGE UP (ref 3.5–5.3)
PROT SERPL-MCNC: 5.9 G/DL — LOW (ref 6–8.3)
RBC # BLD: 2.75 M/UL — LOW (ref 4.2–5.8)
RBC # FLD: 13.1 % — SIGNIFICANT CHANGE UP (ref 10.3–14.5)
SODIUM SERPL-SCNC: 136 MMOL/L — SIGNIFICANT CHANGE UP (ref 135–145)
URATE SERPL-MCNC: 2.9 MG/DL — LOW (ref 3.4–8.8)
WBC # BLD: 1.47 K/UL — LOW (ref 3.8–10.5)
WBC # FLD AUTO: 1.47 K/UL — LOW (ref 3.8–10.5)

## 2020-05-05 PROCEDURE — 99232 SBSQ HOSP IP/OBS MODERATE 35: CPT | Mod: GC

## 2020-05-05 RX ORDER — ELTROMBOPAG OLAMINE 50 MG/1
6 TABLET, FILM COATED ORAL
Qty: 180 | Refills: 3
Start: 2020-05-05 | End: 2020-09-01

## 2020-05-05 RX ORDER — CYCLOSPORINE 100 MG/1
6 CAPSULE ORAL
Qty: 360 | Refills: 1
Start: 2020-05-05 | End: 2020-07-03

## 2020-05-05 RX ADMIN — Medication 1 GRAM(S): at 18:29

## 2020-05-05 RX ADMIN — Medication 5 MILLILITER(S): at 09:42

## 2020-05-05 RX ADMIN — ATOVAQUONE 750 MILLIGRAM(S): 750 SUSPENSION ORAL at 18:29

## 2020-05-05 RX ADMIN — CYCLOSPORINE 600 MILLIGRAM(S): 100 CAPSULE ORAL at 05:22

## 2020-05-05 RX ADMIN — Medication 400 MILLIGRAM(S): at 05:23

## 2020-05-05 RX ADMIN — FAMOTIDINE 20 MILLIGRAM(S): 10 INJECTION INTRAVENOUS at 05:23

## 2020-05-05 RX ADMIN — CASPOFUNGIN ACETATE 260 MILLIGRAM(S): 7 INJECTION, POWDER, LYOPHILIZED, FOR SOLUTION INTRAVENOUS at 09:42

## 2020-05-05 RX ADMIN — Medication 400 MILLIGRAM(S): at 21:51

## 2020-05-05 RX ADMIN — ATOVAQUONE 750 MILLIGRAM(S): 750 SUSPENSION ORAL at 05:23

## 2020-05-05 RX ADMIN — Medication 1 GRAM(S): at 13:14

## 2020-05-05 RX ADMIN — Medication 5 MILLILITER(S): at 23:24

## 2020-05-05 RX ADMIN — Medication 400 MILLIGRAM(S): at 13:14

## 2020-05-05 RX ADMIN — CYCLOSPORINE 600 MILLIGRAM(S): 100 CAPSULE ORAL at 18:29

## 2020-05-05 RX ADMIN — Medication 5 MILLILITER(S): at 13:17

## 2020-05-05 RX ADMIN — Medication 5 MILLILITER(S): at 18:29

## 2020-05-05 RX ADMIN — Medication 5 MILLILITER(S): at 21:54

## 2020-05-05 RX ADMIN — Medication 80 MILLIGRAM(S): at 05:23

## 2020-05-05 RX ADMIN — Medication 1 GRAM(S): at 23:24

## 2020-05-05 RX ADMIN — Medication 1 GRAM(S): at 05:23

## 2020-05-05 RX ADMIN — FAMOTIDINE 20 MILLIGRAM(S): 10 INJECTION INTRAVENOUS at 18:29

## 2020-05-05 NOTE — PROGRESS NOTE ADULT - ATTENDING COMMENTS
34M with no PMH presented to OSH with dizziness x 2 weeks, easy bruising x 2-4 weeks. At home had a syncopal event and called 911, found to have hemoglobin 3s, Platelets 2K, no evidence of DIC. Noted mild petechiae and has had gum bleeding. These have improved. Noted dark stools for 2 weeks. Peripheral smear with immature forms concerning for acute leukemia vs. aplastic anemia   -peripheral flow without blast population, FISH pending   -received ATRA 50 mg BID until PML-CHANTELLE negative   -Bone marrow evaluation(4/20) c/w severe aplastic anemia. Treatment plan: hATG, Cyclosporine, Eltrombopag.   - ATG test dose negative. Begin ATG, CSA, Eltrombopag on 4/23, today is day 12  -patient developed hives and short episode of wheezing with ATG infusion, which resolved- will continue Solumedrol pre-med with ATG(hold Prednisone), then resume Prednisone on day 5 to complete recommended course for prevention of serum sickness.  - IVF, mouth care; discontinued Allopurinol  -receiving transfusions if Hgb< 7, Plt <50k   - s/p vit K for elevated INR  -optho consult given hazy vision, suspect retinal bleed given low platelets. Platelet transfusional support for goal >50k.   -monitor for s/s of bleeding, gum bleeding intermittent   -Paranasal sinusitis on CT head(4/17)- cont Levaquin, begin Posaconazole; on Mepron and Acyclovir with immunosuppressive therapy.  - d11 of treatment, cont transfusion support prn  -LFTs increasing, had switched posaconazole to caspofungin on 4/29 and when still uptrending subsequently held promacta on 5/1. Ordered abdominal sono on 5/3 - normal.  LFTs now improving  -some abdominal discomfort, likely gastritis type symptoms due to steroid, increased pepcid to BID and added carafate.  -Carafate helped significantly with abdominal symptoms, which are now mostly resolved.   -Cyclosporine level 123 on [place] 5/3, however he is on a high dose and liver function unsteady. Will recheck Tuesday 5/5 and if still low may need to increase dose further. 34M with no PMH presented to OSH with dizziness x 2 weeks, easy bruising x 2-4 weeks. At home had a syncopal event and called 911, found to have hemoglobin 3s, Platelets 2K, no evidence of DIC. Noted mild petechiae and has had gum bleeding. These have improved. Noted dark stools for 2 weeks.   -Bone marrow evaluation (4/20) c/w severe aplastic anemia. Treatment plan: hATG, Cyclosporine, Eltrombopag.   - ATG test dose negative. Begin ATG, CSA, Eltrombopag on 4/23, today is day 13  -patient developed hives and short episode of wheezing with ATG infusion, which resolved- will continue Solumedrol pre-med with ATG(hold Prednisone), then resume Prednisone on day 5 to complete recommended course for prevention of serum sickness.  - IVF, mouth care; discontinued Allopurinol  -receiving transfusions if Hgb< 7, Plt <50k   - s/p vit K for elevated INR  -optho consult given hazy vision, suspect retinal bleed given low platelets. Platelet transfusional support for goal >50k.   -monitor for s/s of bleeding, gum bleeding intermittent   -Paranasal sinusitis on CT head(4/17)- cont Levaquin, begin Posaconazole; on Mepron and Acyclovir with immunosuppressive therapy.  -LFTs increasing, had switched posaconazole to caspofungin on 4/29 and when still uptrending subsequently held promacta on 5/1. Ordered abdominal sono on 5/3 - normal.  LFTs stable  -some abdominal discomfort, likely gastritis type symptoms due to steroid, increased pepcid to BID and added carafate.  -Carafate helped significantly with abdominal symptoms, which are now mostly resolved.   -Cyclosporine level 192 today, cont same dose

## 2020-05-05 NOTE — PROGRESS NOTE ADULT - PROBLEM SELECTOR PLAN 3
Grade 3 transaminitis with hyperbilirubinemia, improved today  Posaconazole changed to caspofungin  Promacta on hold as of 5/1. Will re-evaluate when to restart  s/p US Abdomen today- unremarkable Grade 3 transaminitis with hyperbilirubinemia, seems to have plateaued   Posaconazole changed to caspofungin  Promacta on hold as of 5/1. Will re-evaluate when to restart  s/p US Abdomen 5/4- unremarkable

## 2020-05-05 NOTE — PROGRESS NOTE ADULT - SUBJECTIVE AND OBJECTIVE BOX
Diagnosis: Aplastic anemia    Protocol/Chemo Regimen: ATG/Cyclosporine/Prednisone (Previously received Solumedrol Days 1-4) Promacta on hold as of today for Grade 3 transaminitis    Day: 13    Pt endorsed: abdominal discomfort improved    Review of Systems: Patient denies headache, dizziness, visual changes, chest pain, palpitations, SOB, abdominal pain, nausea, vomiting, diarrhea or dysuria.    Pain scale: 0    Diet: Regular    Allergies: No Known Allergies    ANTIMICROBIALS  acyclovir   Oral Tab/Cap 400 milliGRAM(s) Oral every 8 hours  atovaquone Suspension 750 milliGRAM(s) Oral every 12 hours    caspofungin IVPB 50 milliGRAM(s) IV Intermittent every 24 hours  levoFLOXacin  Tablet 500 milliGRAM(s) Oral every 24 hours      STANDING MEDICATIONS  antithymocyte globulin equine Skin Test 0.1 milliLiter(s) IntraDermal once  Biotene Dry Mouth Oral Rinse 5 milliLiter(s) Swish and Spit five times a day  cycloSPORINE  (SandIMMUNE)  Solution 600 milliGRAM(s) Oral every 12 hours  famotidine    Tablet 20 milliGRAM(s) Oral two times a day  sodium chloride 0.9%. 1000 milliLiter(s) IV Continuous <Continuous>  sucralfate suspension 1 Gram(s) Oral every 6 hours      PRN MEDICATIONS  acetaminophen   Tablet .. 650 milliGRAM(s) Oral every 6 hours PRN  acetaminophen   Tablet .. 650 milliGRAM(s) Oral once PRN  aluminum hydroxide/magnesium hydroxide/simethicone Suspension 30 milliLiter(s) Oral every 4 hours PRN  EPINEPHrine     1 mG/mL Injectable 0.3 milliGRAM(s) IntraMuscular once PRN  methylPREDNISolone sodium succinate IVPB 500 milliGRAM(s) IV Intermittent once PRN  ondansetron Injectable 4 milliGRAM(s) IV Push every 6 hours PRN      Vital Signs Last 24 Hrs  T(C): 36.4 (05 May 2020 04:30), Max: 37.1 (04 May 2020 12:05)  T(F): 97.6 (05 May 2020 04:30), Max: 98.8 (04 May 2020 12:05)  HR: 67 (05 May 2020 04:30) (62 - 81)  BP: 146/80 (05 May 2020 04:30) (124/81 - 149/85)  RR: 18 (05 May 2020 04:30) (18 - 18)  SpO2: 98% (05 May 2020 04:30) (96% - 98%)    PHYSICAL EXAM  General: NAD  HEENT: PERRLA, EOMI, clear oropharynx  CV: (+) S1/S2 RRR  Lungs: clear to auscultation, no wheezes or rales  Abdomen: soft, non-tender, non-distended (+) BS  Ext: no edema  Skin: no rashes   Neuro: alert and oriented X 3, no focal deficits  PIV: C/D/I     Cultures:     Culture - Stool (04.26.20 @ 04:51)    Specimen Source: .Stool Feces    Culture Results:   No enteric pathogens isolated.  (Stool culture examined for Salmonella,  Shigella, Campylobacter, Aeromonas, Plesiomonas,  Vibrio, E.coli O157 and Yersinia)    Culture - Stool (04.26.20 @ 04:51)    Specimen Source: .Stool Feces    Culture Results:   No enteric pathogens isolated.  (Stool culture examined for Salmonella,  Shigella, Campylobacter, Aeromonas, Plesiomonas,  Vibrio, E.coli O157 and Yersinia)    Culture - Urine (09.24.15 @ 19:14)    Specimen Source: .Urine Clean Catch (Midstream)    Culture Results:   No growth    LABS:                        8.2    1.47  )-----------( 58       ( 05 May 2020 04:40 )             22.8         Mean Cell Volume : 82.9 fl  Mean Cell Hemoglobin : 29.8 pg  Mean Cell Hemoglobin Concentration : 36.0 gm/dL  Auto Neutrophil # : 0.26 K/uL  Auto Lymphocyte # : 1.06 K/uL  Auto Monocyte # : 0.10 K/uL  Auto Eosinophil # : 0.00 K/uL  Auto Basophil # : 0.00 K/uL  Auto Neutrophil % : 17.6 %  Auto Lymphocyte % : 72.2 %  Auto Monocyte % : 6.5 %  Auto Eosinophil % : 0.0 %  Auto Basophil % : 0.0 %      05-05    136  |  97  |  20  ----------------------------<  108<H>  3.6   |  26  |  0.82    Ca    8.8      05 May 2020 04:40  Phos  4.9     05-05  Mg     1.7     05-05    TPro  5.9<L>  /  Alb  3.3  /  TBili  2.7<H>  /  DBili  x   /  AST  100<H>  /  ALT  556<H>  /  AlkPhos  104  05-05    PT/INR - ( 04 May 2020 06:49 )   PT: 11.5 sec;   INR: 1.01 ratio    PTT - ( 04 May 2020 06:49 )  PTT:22.8 sec      Uric Acid 2.9    RADIOLOGY & ADDITIONAL STUDIES:    from: US Abdomen Upper Quadrant Right (05.04.20 @ 09:45)   IMPRESSION:   Normal right upper quadrant abdominal ultrasound.

## 2020-05-05 NOTE — PROGRESS NOTE ADULT - PROBLEM SELECTOR PLAN 1
4/20 BM Bx consistent with Aplastic Anemia  Status post Horse ATG 40mg/kg/day daily x 4 days (4/23- 4/26)  Continue Prednisone (taper to continue through 5/17) and CSA   CSA level today 123. will continue same dose,  increased to 600mg PO q12 on 4/30  Promacta on hold for increasing liver disfunction  Monitor CBC/Lytes and transfuse/replete PRN  Keep PLT> 50k for retinal hemorrhage  Strict Is and Os/Daily weights/Mouth Care  IVF 4/20 BM Bx consistent with Aplastic Anemia  Status post Horse ATG 40mg/kg/day daily x 4 days (4/23- 4/26)  Continue Prednisone (taper to continue through 5/17) and CSA   CSA level today 192. will continue same dose,  increased to 600mg PO q12 on 4/30  Promacta on hold for increasing liver disfunction  Monitor CBC/Lytes and transfuse/replete PRN  Keep PLT> 50k for retinal hemorrhage  Strict Is and Os/Daily weights/Mouth Care  IVF  Awaiting approval for outpatient medications cyclosporine and promacta, letter of medical necessity submitted

## 2020-05-06 LAB
ALBUMIN SERPL ELPH-MCNC: 3.5 G/DL — SIGNIFICANT CHANGE UP (ref 3.3–5)
ALP SERPL-CCNC: 100 U/L — SIGNIFICANT CHANGE UP (ref 40–120)
ALT FLD-CCNC: 456 U/L — HIGH (ref 10–45)
ANION GAP SERPL CALC-SCNC: 13 MMOL/L — SIGNIFICANT CHANGE UP (ref 5–17)
AST SERPL-CCNC: 54 U/L — HIGH (ref 10–40)
BASOPHILS # BLD AUTO: 0 K/UL — SIGNIFICANT CHANGE UP (ref 0–0.2)
BASOPHILS NFR BLD AUTO: 0 % — SIGNIFICANT CHANGE UP (ref 0–2)
BILIRUB SERPL-MCNC: 2.2 MG/DL — HIGH (ref 0.2–1.2)
BUN SERPL-MCNC: 27 MG/DL — HIGH (ref 7–23)
CALCIUM SERPL-MCNC: 9.3 MG/DL — SIGNIFICANT CHANGE UP (ref 8.4–10.5)
CHLORIDE SERPL-SCNC: 99 MMOL/L — SIGNIFICANT CHANGE UP (ref 96–108)
CO2 SERPL-SCNC: 24 MMOL/L — SIGNIFICANT CHANGE UP (ref 22–31)
CREAT SERPL-MCNC: 0.84 MG/DL — SIGNIFICANT CHANGE UP (ref 0.5–1.3)
EOSINOPHIL # BLD AUTO: 0 K/UL — SIGNIFICANT CHANGE UP (ref 0–0.5)
EOSINOPHIL NFR BLD AUTO: 0 % — SIGNIFICANT CHANGE UP (ref 0–6)
GLUCOSE SERPL-MCNC: 109 MG/DL — HIGH (ref 70–99)
HCT VFR BLD CALC: 24.5 % — LOW (ref 39–50)
HGB BLD-MCNC: 8.6 G/DL — LOW (ref 13–17)
LDH SERPL L TO P-CCNC: 164 U/L — SIGNIFICANT CHANGE UP (ref 50–242)
LYMPHOCYTES # BLD AUTO: 1.34 K/UL — SIGNIFICANT CHANGE UP (ref 1–3.3)
LYMPHOCYTES # BLD AUTO: 90.8 % — HIGH (ref 13–44)
MAGNESIUM SERPL-MCNC: 1.7 MG/DL — SIGNIFICANT CHANGE UP (ref 1.6–2.6)
MCHC RBC-ENTMCNC: 29.7 PG — SIGNIFICANT CHANGE UP (ref 27–34)
MCHC RBC-ENTMCNC: 35.1 GM/DL — SIGNIFICANT CHANGE UP (ref 32–36)
MCV RBC AUTO: 84.5 FL — SIGNIFICANT CHANGE UP (ref 80–100)
MONOCYTES # BLD AUTO: 0.01 K/UL — SIGNIFICANT CHANGE UP (ref 0–0.9)
MONOCYTES NFR BLD AUTO: 0.9 % — LOW (ref 2–14)
NEUTROPHILS # BLD AUTO: 0.12 K/UL — LOW (ref 1.8–7.4)
NEUTROPHILS NFR BLD AUTO: 7.4 % — LOW (ref 43–77)
PHOSPHATE SERPL-MCNC: 5.1 MG/DL — HIGH (ref 2.5–4.5)
PLATELET # BLD AUTO: 40 K/UL — LOW (ref 150–400)
PNH GRANULOCYTES: SIGNIFICANT CHANGE UP
PNH MONOCYTES: SIGNIFICANT CHANGE UP
POTASSIUM SERPL-MCNC: 3.6 MMOL/L — SIGNIFICANT CHANGE UP (ref 3.5–5.3)
POTASSIUM SERPL-SCNC: 3.6 MMOL/L — SIGNIFICANT CHANGE UP (ref 3.5–5.3)
PROT SERPL-MCNC: 6.4 G/DL — SIGNIFICANT CHANGE UP (ref 6–8.3)
RBC # BLD: 2.9 M/UL — LOW (ref 4.2–5.8)
RBC # FLD: 13.2 % — SIGNIFICANT CHANGE UP (ref 10.3–14.5)
SODIUM SERPL-SCNC: 136 MMOL/L — SIGNIFICANT CHANGE UP (ref 135–145)
URATE SERPL-MCNC: 3.8 MG/DL — SIGNIFICANT CHANGE UP (ref 3.4–8.8)
WBC # BLD: 1.48 K/UL — LOW (ref 3.8–10.5)
WBC # FLD AUTO: 1.48 K/UL — LOW (ref 3.8–10.5)

## 2020-05-06 PROCEDURE — 99232 SBSQ HOSP IP/OBS MODERATE 35: CPT | Mod: GC

## 2020-05-06 RX ORDER — CALCIUM ACETATE 667 MG
667 TABLET ORAL
Refills: 0 | Status: DISCONTINUED | OUTPATIENT
Start: 2020-05-06 | End: 2020-05-07

## 2020-05-06 RX ADMIN — Medication 1 GRAM(S): at 11:13

## 2020-05-06 RX ADMIN — Medication 1 GRAM(S): at 23:56

## 2020-05-06 RX ADMIN — FAMOTIDINE 20 MILLIGRAM(S): 10 INJECTION INTRAVENOUS at 17:35

## 2020-05-06 RX ADMIN — FAMOTIDINE 20 MILLIGRAM(S): 10 INJECTION INTRAVENOUS at 06:03

## 2020-05-06 RX ADMIN — CYCLOSPORINE 600 MILLIGRAM(S): 100 CAPSULE ORAL at 06:04

## 2020-05-06 RX ADMIN — CASPOFUNGIN ACETATE 260 MILLIGRAM(S): 7 INJECTION, POWDER, LYOPHILIZED, FOR SOLUTION INTRAVENOUS at 09:21

## 2020-05-06 RX ADMIN — ATOVAQUONE 750 MILLIGRAM(S): 750 SUSPENSION ORAL at 06:02

## 2020-05-06 RX ADMIN — Medication 667 MILLIGRAM(S): at 11:09

## 2020-05-06 RX ADMIN — Medication 5 MILLILITER(S): at 23:56

## 2020-05-06 RX ADMIN — Medication 400 MILLIGRAM(S): at 06:03

## 2020-05-06 RX ADMIN — Medication 5 MILLILITER(S): at 11:13

## 2020-05-06 RX ADMIN — SODIUM CHLORIDE 75 MILLILITER(S): 9 INJECTION INTRAMUSCULAR; INTRAVENOUS; SUBCUTANEOUS at 06:05

## 2020-05-06 RX ADMIN — Medication 667 MILLIGRAM(S): at 21:20

## 2020-05-06 RX ADMIN — Medication 400 MILLIGRAM(S): at 21:17

## 2020-05-06 RX ADMIN — Medication 667 MILLIGRAM(S): at 17:38

## 2020-05-06 RX ADMIN — Medication 5 MILLILITER(S): at 17:35

## 2020-05-06 RX ADMIN — Medication 400 MILLIGRAM(S): at 13:18

## 2020-05-06 RX ADMIN — Medication 30 MILLILITER(S): at 06:06

## 2020-05-06 RX ADMIN — CYCLOSPORINE 600 MILLIGRAM(S): 100 CAPSULE ORAL at 17:35

## 2020-05-06 RX ADMIN — Medication 1 GRAM(S): at 17:35

## 2020-05-06 RX ADMIN — Medication 60 MILLIGRAM(S): at 06:03

## 2020-05-06 RX ADMIN — Medication 5 MILLILITER(S): at 21:17

## 2020-05-06 RX ADMIN — ATOVAQUONE 750 MILLIGRAM(S): 750 SUSPENSION ORAL at 17:35

## 2020-05-06 RX ADMIN — Medication 1 GRAM(S): at 06:04

## 2020-05-06 RX ADMIN — ONDANSETRON 4 MILLIGRAM(S): 8 TABLET, FILM COATED ORAL at 08:06

## 2020-05-06 NOTE — PROGRESS NOTE ADULT - ATTENDING COMMENTS
34M with no PMH presented to OSH with dizziness x 2 weeks, easy bruising x 2-4 weeks. At home had a syncopal event and called 911, found to have hemoglobin 3s, Platelets 2K, no evidence of DIC. Noted mild petechiae and has had gum bleeding. These have improved. Noted dark stools for 2 weeks.   -Bone marrow evaluation (4/20) c/w severe aplastic anemia. Treatment plan: hATG, Cyclosporine, Eltrombopag.   - ATG test dose negative. Begin ATG, CSA, Eltrombopag on 4/23, today is day 13  -patient developed hives and short episode of wheezing with ATG infusion, which resolved- will continue Solumedrol pre-med with ATG(hold Prednisone), then resume Prednisone on day 5 to complete recommended course for prevention of serum sickness.  - IVF, mouth care; discontinued Allopurinol  -receiving transfusions if Hgb< 7, Plt <50k   - s/p vit K for elevated INR  -optho consult given hazy vision, suspect retinal bleed given low platelets. Platelet transfusional support for goal >50k.   -monitor for s/s of bleeding, gum bleeding intermittent   -Paranasal sinusitis on CT head(4/17)- cont Levaquin, begin Posaconazole; on Mepron and Acyclovir with immunosuppressive therapy.  -LFTs increasing, had switched posaconazole to caspofungin on 4/29 and when still uptrending subsequently held promacta on 5/1. Ordered abdominal sono on 5/3 - normal.  LFTs stable  -some abdominal discomfort, likely gastritis type symptoms due to steroid, increased pepcid to BID and added carafate.  -Carafate helped significantly with abdominal symptoms, which are now mostly resolved.   -Cyclosporine level 192 today, cont same dose 34M with no PMH presented to OSH with dizziness x 2 weeks, easy bruising x 2-4 weeks. At home had a syncopal event and called 911, found to have hemoglobin 3s, Platelets 2K, no evidence of DIC. Noted mild petechiae and has had gum bleeding. These have improved. Noted dark stools for 2 weeks.   -Bone marrow evaluation (4/20) c/w severe aplastic anemia. Treatment plan: hATG, Cyclosporine, Eltrombopag.   - ATG test dose negative. Begin ATG, CSA, Eltrombopag on 4/23, today is day 14  -patient developed hives and short episode of wheezing with ATG infusion, which resolved- will continue Solumedrol pre-med with ATG(hold Prednisone), then resume Prednisone on day 5 to complete recommended course for prevention of serum sickness.  - IVF, mouth care; discontinued Allopurinol  -receiving transfusions if Hgb< 7, Plt <50k   - s/p vit K for elevated INR  -optho consult given hazy vision, suspect retinal bleed given low platelets. Platelet transfusional support for goal >50k.   -monitor for s/s of bleeding, gum bleeding intermittent   -Paranasal sinusitis on CT head(4/17)- cont Levaquin, begin Posaconazole; on Mepron and Acyclovir with immunosuppressive therapy.  -LFTs increasing, had switched posaconazole to caspofungin on 4/29 and when still uptrending subsequently held promacta on 5/1. Ordered abdominal sono on 5/3 - normal.  LFTs improving  -some abdominal discomfort, likely gastritis type symptoms due to steroid, increased pepcid to BID and added carafate.  -Carafate helped significantly with abdominal symptoms, which are now mostly resolved.   -Cyclosporine level 192 5/5, cont same dose

## 2020-05-06 NOTE — PROGRESS NOTE ADULT - PROBLEM SELECTOR PLAN 3
Grade 3 transaminitis with hyperbilirubinemia, seems to have plateaued   Posaconazole changed to caspofungin  Promacta on hold as of 5/1. Will re-evaluate when to restart  s/p US Abdomen 5/4- unremarkable

## 2020-05-06 NOTE — PROGRESS NOTE ADULT - SUBJECTIVE AND OBJECTIVE BOX
Diagnosis: Aplastic anemia    Protocol/Chemo Regimen: ATG/Cyclosporine/Prednisone (Previously received Solumedrol Days 1-4) Promacta on hold as of today for Grade 3 transaminitis    Day: 14    Pt endorsed: abdominal discomfort improved    Review of Systems: Patient denies headache, dizziness, visual changes, chest pain, palpitations, SOB, abdominal pain, nausea, vomiting, diarrhea or dysuria.    Pain scale: 0    Diet: Regular    Allergies: No Known Allergies      ANTIMICROBIALS  acyclovir   Oral Tab/Cap 400 milliGRAM(s) Oral every 8 hours  atovaquone Suspension 750 milliGRAM(s) Oral every 12 hours  caspofungin IVPB      caspofungin IVPB 50 milliGRAM(s) IV Intermittent every 24 hours  levoFLOXacin  Tablet 500 milliGRAM(s) Oral every 24 hours      STANDING MEDICATIONS  antithymocyte globulin equine Skin Test 0.1 milliLiter(s) IntraDermal once  Biotene Dry Mouth Oral Rinse 5 milliLiter(s) Swish and Spit five times a day  cycloSPORINE  (SandIMMUNE)  Solution 600 milliGRAM(s) Oral every 12 hours  famotidine    Tablet 20 milliGRAM(s) Oral two times a day  predniSONE   Tablet 60 milliGRAM(s) Oral daily  sodium chloride 0.9%. 1000 milliLiter(s) IV Continuous <Continuous>  sucralfate suspension 1 Gram(s) Oral every 6 hours      PRN MEDICATIONS  acetaminophen   Tablet .. 650 milliGRAM(s) Oral every 6 hours PRN  acetaminophen   Tablet .. 650 milliGRAM(s) Oral once PRN  aluminum hydroxide/magnesium hydroxide/simethicone Suspension 30 milliLiter(s) Oral every 4 hours PRN  EPINEPHrine     1 mG/mL Injectable 0.3 milliGRAM(s) IntraMuscular once PRN  methylPREDNISolone sodium succinate IVPB 500 milliGRAM(s) IV Intermittent once PRN  ondansetron Injectable 4 milliGRAM(s) IV Push every 6 hours PRN      Vital Signs Last 24 Hrs  T(C): 36.3 (06 May 2020 04:31), Max: 36.8 (05 May 2020 18:09)  T(F): 97.3 (06 May 2020 04:31), Max: 98.2 (05 May 2020 18:09)  HR: 107 (06 May 2020 04:31) (72 - 107)  BP: 109/68 (06 May 2020 04:31) (109/68 - 148/85)  BP(mean): --  RR: 18 (06 May 2020 04:31) (17 - 18)  SpO2: 97% (06 May 2020 04:31) (96% - 98%)      PHYSICAL EXAM  General: NAD  HEENT: clear oropharynx  CV: (+) S1/S2 RRR  Lungs: clear to auscultation, no wheezes or rales  Abdomen: soft, non-tender, non-distended (+) BS  Ext: no edema  Skin: no rashes   Neuro: alert and oriented X 3  PIV: C/D/I       LABS:                        8.6    1.48  )-----------( 40       ( 06 May 2020 06:51 )             24.5         Mean Cell Volume : 84.5 fl  Mean Cell Hemoglobin : 29.7 pg  Mean Cell Hemoglobin Concentration : 35.1 gm/dL  Auto Neutrophil # : x  Auto Lymphocyte # : x  Auto Monocyte # : x  Auto Eosinophil # : x  Auto Basophil # : x  Auto Neutrophil % : x  Auto Lymphocyte % : x  Auto Monocyte % : x  Auto Eosinophil % : x  Auto Basophil % : x      05-06    136  |  99  |  27<H>  ----------------------------<  109<H>  3.6   |  24  |  0.84    Ca    9.3      06 May 2020 06:51  Phos  5.1     05-06  Mg     1.7     05-06    TPro  6.4  /  Alb  3.5  /  TBili  2.2<H>  /  DBili  x   /  AST  54<H>  /  ALT  456<H>  /  AlkPhos  100  05-06      Mg 1.7  Phos 5.1            Uric Acid 3.8        RADIOLOGY & ADDITIONAL STUDIES:    from: US Abdomen Upper Quadrant Right (05.04.20 @ 09:45)   IMPRESSION:   Normal right upper quadrant abdominal ultrasound. Diagnosis: Aplastic anemia    Protocol/Chemo Regimen: ATG/Cyclosporine/Prednisone (Previously received Solumedrol Days 1-4) Promacta on hold as of today for Grade 3 transaminitis    Day: 14    Pt endorsed: nausea; lips tingling 1-2 times in the past week; abdominal pain improved; right eye haziness much better     Review of Systems: Patient denies headache, dizziness, chest pain, palpitations, SOB, nausea, vomiting, diarrhea or dysuria.    Pain scale: 4-5/10 abd this am, none now     Diet: Regular, ensure enlive qd    Allergies: No Known Allergies      ANTIMICROBIALS  acyclovir   Oral Tab/Cap 400 milliGRAM(s) Oral every 8 hours  atovaquone Suspension 750 milliGRAM(s) Oral every 12 hours  caspofungin IVPB      caspofungin IVPB 50 milliGRAM(s) IV Intermittent every 24 hours  levoFLOXacin  Tablet 500 milliGRAM(s) Oral every 24 hours      STANDING MEDICATIONS  antithymocyte globulin equine Skin Test 0.1 milliLiter(s) IntraDermal once  Biotene Dry Mouth Oral Rinse 5 milliLiter(s) Swish and Spit five times a day  cycloSPORINE  (SandIMMUNE)  Solution 600 milliGRAM(s) Oral every 12 hours  famotidine    Tablet 20 milliGRAM(s) Oral two times a day  predniSONE   Tablet 60 milliGRAM(s) Oral daily  sodium chloride 0.9%. 1000 milliLiter(s) IV Continuous <Continuous>  sucralfate suspension 1 Gram(s) Oral every 6 hours      PRN MEDICATIONS  acetaminophen   Tablet .. 650 milliGRAM(s) Oral every 6 hours PRN  acetaminophen   Tablet .. 650 milliGRAM(s) Oral once PRN  aluminum hydroxide/magnesium hydroxide/simethicone Suspension 30 milliLiter(s) Oral every 4 hours PRN  EPINEPHrine     1 mG/mL Injectable 0.3 milliGRAM(s) IntraMuscular once PRN  methylPREDNISolone sodium succinate IVPB 500 milliGRAM(s) IV Intermittent once PRN  ondansetron Injectable 4 milliGRAM(s) IV Push every 6 hours PRN      Vital Signs Last 24 Hrs  T(C): 36.3 (06 May 2020 04:31), Max: 36.8 (05 May 2020 18:09)  T(F): 97.3 (06 May 2020 04:31), Max: 98.2 (05 May 2020 18:09)  HR: 107 (06 May 2020 04:31) (72 - 107)  BP: 109/68 (06 May 2020 04:31) (109/68 - 148/85)  BP(mean): --  RR: 18 (06 May 2020 04:31) (17 - 18)  SpO2: 97% (06 May 2020 04:31) (96% - 98%)      PHYSICAL EXAM  General: NAD  HEENT: clear oropharynx  CV: (+) S1/S2 RRR  Lungs: clear to auscultation, no wheezes or rales  Abdomen: soft, non-tender, non-distended (+) BS  Ext: no edema  Skin: no rashes   Neuro: alert and oriented X 3  PIV: C/D/I       LABS:                        8.6    1.48  )-----------( 40       ( 06 May 2020 06:51 )             24.5         Mean Cell Volume : 84.5 fl  Mean Cell Hemoglobin : 29.7 pg  Mean Cell Hemoglobin Concentration : 35.1 gm/dL  Auto Neutrophil # : 0.12 K/uL  Auto Lymphocyte # : 1.34 K/uL  Auto Monocyte # : 0.01 K/uL  Auto Eosinophil # : 0.00 K/uL  Auto Basophil # : 0.00 K/uL  Auto Neutrophil % : 7.4 %  Auto Lymphocyte % : 90.8 %  Auto Monocyte % : 0.9 %  Auto Eosinophil % : 0.0 %  Auto Basophil % : 0.0 %      05-06    136  |  99  |  27<H>  ----------------------------<  109<H>  3.6   |  24  |  0.84    Ca    9.3      06 May 2020 06:51  Phos  5.1     05-06  Mg     1.7     05-06    TPro  6.4  /  Alb  3.5  /  TBili  2.2<H>  /  DBili  x   /  AST  54<H>  /  ALT  456<H>  /  AlkPhos  100  05-06      Uric Acid 3.8      RADIOLOGY & ADDITIONAL STUDIES:    from: US Abdomen Upper Quadrant Right (05.04.20 @ 09:45)   IMPRESSION:   Normal right upper quadrant abdominal ultrasound.

## 2020-05-06 NOTE — PROGRESS NOTE ADULT - ASSESSMENT
This is a 35 yo M with no significant PMHx admitted for management of newly diagnosed Aplastic Anemia. The patient is status post Equine ATG and continues CSA/Prednisone/Eltrombopag. The patients hospital course has been complicated by retinal hemorrhage, ATG reaction and liver dysfunction with hyperbilirubinemia and grade 3 transaminitis likely drug induced. The patient has pancytopenia secondary to treatment and/or disease process. This is a 35 yo M with no significant PMHx admitted for management of newly diagnosed Aplastic Anemia. The patient is status post Equine ATG and continues CSA/Prednisone/Eltrombopag. The patients hospital course has been complicated by retinal hemorrhage, ATG reaction and liver dysfunction with hyperbilirubinemia and grade 3 transaminitis likely drug induced. The patient has pancytopenia secondary to chemotherapy and/or disease process.

## 2020-05-06 NOTE — PROGRESS NOTE ADULT - PROBLEM SELECTOR PLAN 1
4/20 BM Bx consistent with Aplastic Anemia  Status post Horse ATG 40mg/kg/day daily x 4 days (4/23- 4/26)  Continue Prednisone (taper to continue through 5/17) and CSA   CSA level today 192. will continue same dose,  increased to 600mg PO q12 on 4/30  Promacta on hold for increasing liver disfunction  Monitor CBC/Lytes and transfuse/replete PRN  Keep PLT> 50k for retinal hemorrhage  Strict Is and Os/Daily weights/Mouth Care  IVF  Awaiting approval for outpatient medications cyclosporine and promacta, letter of medical necessity submitted 4/20 BM Bx consistent with Aplastic Anemia  Status post Horse ATG 40mg/kg/day daily x 4 days (4/23- 4/26)  Continue Prednisone (taper to continue through 5/17) and CSA   CSA level today 192. will continue same dose,  increased to 600mg PO q12 on 4/30  Promacta on hold for increasing liver disfunction  Monitor CBC/Lytes and transfuse/replete PRN  Keep PLT> 50k for retinal hemorrhage  Thrombocytopenia: PLT x1 today  Mild hyperphosphatemia: Phoslo x 4 doses   Strict Is and Os/Daily weights/Mouth Care  IVF  Awaiting approval for outpatient medications cyclosporine and promacta, letter of medical necessity submitted

## 2020-05-06 NOTE — CHART NOTE - NSCHARTNOTEFT_GEN_A_CORE
Nutrition Follow Up Note  Patient seen for: Nutrition follow up. Pt with Aplastic anemia receiving treatment.     Source: Pt    Diet : Regular diet with ensure enlive 1 daily     Patient reports: Slight nausea this morning-first time he had experienced this but stated it has since resolved, pt reports abdominal discomfort in the morning has been much improved and has not been impeding on intake, last BM yesterday.      PO intake : Pt reports appetite and intake have improved, stated he is now able to eat 100% of all meals and will take ensure shakes as well. Pt will order double portions of proteins as desired.       Weight: 215.4 lbs (4/18), 211.2 lbs (4/27), 210.7 lbs (5/3), 212.7 lbs (5/5), weight decreased from admission but has stabilized recently.     Pertinent Medications: MEDICATIONS  (STANDING):  acyclovir   Oral Tab/Cap 400 milliGRAM(s) Oral every 8 hours  antithymocyte globulin equine Skin Test 0.1 milliLiter(s) IntraDermal once  atovaquone Suspension 750 milliGRAM(s) Oral every 12 hours  Biotene Dry Mouth Oral Rinse 5 milliLiter(s) Swish and Spit five times a day  caspofungin IVPB      caspofungin IVPB 50 milliGRAM(s) IV Intermittent every 24 hours  cycloSPORINE  (SandIMMUNE)  Solution 600 milliGRAM(s) Oral every 12 hours  famotidine    Tablet 20 milliGRAM(s) Oral two times a day  levoFLOXacin  Tablet 500 milliGRAM(s) Oral every 24 hours  predniSONE   Tablet 60 milliGRAM(s) Oral daily  sodium chloride 0.9%. 1000 milliLiter(s) (75 mL/Hr) IV Continuous <Continuous>  sucralfate suspension 1 Gram(s) Oral every 6 hours    MEDICATIONS  (PRN):  acetaminophen   Tablet .. 650 milliGRAM(s) Oral every 6 hours PRN Temp greater or equal to 38C (100.4F), Mild Pain (1 - 3)  acetaminophen   Tablet .. 650 milliGRAM(s) Oral once PRN Moderate Pain (4 - 6)  aluminum hydroxide/magnesium hydroxide/simethicone Suspension 30 milliLiter(s) Oral every 4 hours PRN Dyspepsia  EPINEPHrine     1 mG/mL Injectable 0.3 milliGRAM(s) IntraMuscular once PRN anaphylaxis  methylPREDNISolone sodium succinate IVPB 500 milliGRAM(s) IV Intermittent once PRN anaphylaxis  ondansetron Injectable 4 milliGRAM(s) IV Push every 6 hours PRN Nausea and/or Vomiting    Pertinent Labs: 05-06 @ 06:51: Na 136, BUN 27<H>, Cr 0.84, <H>, K+ 3.6, Phos 5.1<H>, Mg 1.7, Alk Phos 100, ALT/SGPT 456<H>, AST/SGOT 54<H>, HbA1c --    Finger Sticks: none       Skin per nursing documentation: free of pressure injury   Edema: none     Estimated Needs:   [ x] no change since previous assessment  [ ] recalculated:     Previous Nutrition Diagnosis: Predicted suboptimal energy intake   Nutrition Diagnosis is: ongoing, addressed with supplements and improved appetite     New Nutrition Diagnosis:  Related to:    As evidenced by:      Interventions:     Recommend  1) Continue regular diet with ensure enlive 1 daily as ordered  2) Continue to encourage po intake and obtain/honor food preferences as able    Monitoring and Evaluation:     Continue to monitor Nutritional intake, Tolerance to diet prescription, weights, labs, skin integrity    RD remains available upon request and will follow up per protocol    Angie Maki R.D., [facility], Pager #[phone]

## 2020-05-07 LAB
ALBUMIN SERPL ELPH-MCNC: 3.2 G/DL — LOW (ref 3.3–5)
ALP SERPL-CCNC: 90 U/L — SIGNIFICANT CHANGE UP (ref 40–120)
ALT FLD-CCNC: 331 U/L — HIGH (ref 10–45)
ANION GAP SERPL CALC-SCNC: 12 MMOL/L — SIGNIFICANT CHANGE UP (ref 5–17)
APTT BLD: 23.9 SEC — LOW (ref 27.5–36.3)
AST SERPL-CCNC: 44 U/L — HIGH (ref 10–40)
BASOPHILS # BLD AUTO: 0 K/UL — SIGNIFICANT CHANGE UP (ref 0–0.2)
BASOPHILS NFR BLD AUTO: 0 % — SIGNIFICANT CHANGE UP (ref 0–2)
BILIRUB SERPL-MCNC: 1.9 MG/DL — HIGH (ref 0.2–1.2)
BUN SERPL-MCNC: 24 MG/DL — HIGH (ref 7–23)
CALCIUM SERPL-MCNC: 9 MG/DL — SIGNIFICANT CHANGE UP (ref 8.4–10.5)
CHLORIDE SERPL-SCNC: 101 MMOL/L — SIGNIFICANT CHANGE UP (ref 96–108)
CO2 SERPL-SCNC: 24 MMOL/L — SIGNIFICANT CHANGE UP (ref 22–31)
CREAT SERPL-MCNC: 0.86 MG/DL — SIGNIFICANT CHANGE UP (ref 0.5–1.3)
EOSINOPHIL # BLD AUTO: 0.01 K/UL — SIGNIFICANT CHANGE UP (ref 0–0.5)
EOSINOPHIL NFR BLD AUTO: 0.9 % — SIGNIFICANT CHANGE UP (ref 0–6)
FIBRINOGEN PPP-MCNC: 496 MG/DL — SIGNIFICANT CHANGE UP (ref 320–500)
GLUCOSE SERPL-MCNC: 100 MG/DL — HIGH (ref 70–99)
HCT VFR BLD CALC: 22.8 % — LOW (ref 39–50)
HGB BLD-MCNC: 8.1 G/DL — LOW (ref 13–17)
INR BLD: 0.93 RATIO — SIGNIFICANT CHANGE UP (ref 0.88–1.16)
LDH SERPL L TO P-CCNC: 160 U/L — SIGNIFICANT CHANGE UP (ref 50–242)
LYMPHOCYTES # BLD AUTO: 1.34 K/UL — SIGNIFICANT CHANGE UP (ref 1–3.3)
LYMPHOCYTES # BLD AUTO: 83.8 % — HIGH (ref 13–44)
MAGNESIUM SERPL-MCNC: 1.6 MG/DL — SIGNIFICANT CHANGE UP (ref 1.6–2.6)
MCHC RBC-ENTMCNC: 30 PG — SIGNIFICANT CHANGE UP (ref 27–34)
MCHC RBC-ENTMCNC: 35.5 GM/DL — SIGNIFICANT CHANGE UP (ref 32–36)
MCV RBC AUTO: 84.4 FL — SIGNIFICANT CHANGE UP (ref 80–100)
MONOCYTES # BLD AUTO: 0.01 K/UL — SIGNIFICANT CHANGE UP (ref 0–0.9)
MONOCYTES NFR BLD AUTO: 0.9 % — LOW (ref 2–14)
NEUTROPHILS # BLD AUTO: 0.19 K/UL — LOW (ref 1.8–7.4)
NEUTROPHILS NFR BLD AUTO: 10.8 % — LOW (ref 43–77)
PHOSPHATE SERPL-MCNC: 5.7 MG/DL — HIGH (ref 2.5–4.5)
PLATELET # BLD AUTO: 60 K/UL — LOW (ref 150–400)
POTASSIUM SERPL-MCNC: 3.9 MMOL/L — SIGNIFICANT CHANGE UP (ref 3.5–5.3)
POTASSIUM SERPL-SCNC: 3.9 MMOL/L — SIGNIFICANT CHANGE UP (ref 3.5–5.3)
PROT SERPL-MCNC: 5.9 G/DL — LOW (ref 6–8.3)
PROTHROM AB SERPL-ACNC: 10.5 SEC — SIGNIFICANT CHANGE UP (ref 10–13.1)
RBC # BLD: 2.7 M/UL — LOW (ref 4.2–5.8)
RBC # FLD: 13.3 % — SIGNIFICANT CHANGE UP (ref 10.3–14.5)
SODIUM SERPL-SCNC: 137 MMOL/L — SIGNIFICANT CHANGE UP (ref 135–145)
URATE SERPL-MCNC: 3.1 MG/DL — LOW (ref 3.4–8.8)
WBC # BLD: 1.6 K/UL — LOW (ref 3.8–10.5)
WBC # FLD AUTO: 1.6 K/UL — LOW (ref 3.8–10.5)

## 2020-05-07 PROCEDURE — 99232 SBSQ HOSP IP/OBS MODERATE 35: CPT | Mod: GC

## 2020-05-07 RX ORDER — CALCIUM ACETATE 667 MG
1334 TABLET ORAL
Refills: 0 | Status: COMPLETED | OUTPATIENT
Start: 2020-05-07 | End: 2020-05-08

## 2020-05-07 RX ADMIN — Medication 5 MILLILITER(S): at 16:48

## 2020-05-07 RX ADMIN — Medication 650 MILLIGRAM(S): at 22:09

## 2020-05-07 RX ADMIN — CASPOFUNGIN ACETATE 260 MILLIGRAM(S): 7 INJECTION, POWDER, LYOPHILIZED, FOR SOLUTION INTRAVENOUS at 08:41

## 2020-05-07 RX ADMIN — Medication 400 MILLIGRAM(S): at 05:44

## 2020-05-07 RX ADMIN — Medication 400 MILLIGRAM(S): at 22:08

## 2020-05-07 RX ADMIN — Medication 400 MILLIGRAM(S): at 13:25

## 2020-05-07 RX ADMIN — Medication 60 MILLIGRAM(S): at 05:44

## 2020-05-07 RX ADMIN — CYCLOSPORINE 600 MILLIGRAM(S): 100 CAPSULE ORAL at 05:44

## 2020-05-07 RX ADMIN — Medication 1334 MILLIGRAM(S): at 22:08

## 2020-05-07 RX ADMIN — FAMOTIDINE 20 MILLIGRAM(S): 10 INJECTION INTRAVENOUS at 16:49

## 2020-05-07 RX ADMIN — Medication 5 MILLILITER(S): at 22:08

## 2020-05-07 RX ADMIN — ATOVAQUONE 750 MILLIGRAM(S): 750 SUSPENSION ORAL at 05:44

## 2020-05-07 RX ADMIN — Medication 1 GRAM(S): at 13:24

## 2020-05-07 RX ADMIN — Medication 1 GRAM(S): at 05:45

## 2020-05-07 RX ADMIN — CYCLOSPORINE 600 MILLIGRAM(S): 100 CAPSULE ORAL at 16:48

## 2020-05-07 RX ADMIN — Medication 5 MILLILITER(S): at 13:24

## 2020-05-07 RX ADMIN — Medication 1334 MILLIGRAM(S): at 13:29

## 2020-05-07 RX ADMIN — Medication 1334 MILLIGRAM(S): at 07:49

## 2020-05-07 RX ADMIN — Medication 5 MILLILITER(S): at 07:50

## 2020-05-07 RX ADMIN — ATOVAQUONE 750 MILLIGRAM(S): 750 SUSPENSION ORAL at 16:48

## 2020-05-07 RX ADMIN — Medication 1 GRAM(S): at 16:48

## 2020-05-07 RX ADMIN — FAMOTIDINE 20 MILLIGRAM(S): 10 INJECTION INTRAVENOUS at 05:44

## 2020-05-07 RX ADMIN — Medication 1334 MILLIGRAM(S): at 16:47

## 2020-05-07 NOTE — PROGRESS NOTE ADULT - SUBJECTIVE AND OBJECTIVE BOX
Diagnosis: Aplastic anemia    Protocol/Chemo Regimen: ATG/Cyclosporine/Prednisone (Previously received Solumedrol Days 1-4) Promacta on hold as of today for Grade 3 transaminitis    Day: 15    Pt endorsed: nausea; lips tingling 1-2 times in the past week; abdominal pain improved; right eye haziness much better     Review of Systems: Patient denies headache, dizziness, chest pain, palpitations, SOB, nausea, vomiting, diarrhea or dysuria.    Pain scale: 4-5/10 abd this am, none now     Diet: Regular, ensure enlive qd    Allergies: No Known Allergies      ANTIMICROBIALS  acyclovir   Oral Tab/Cap 400 milliGRAM(s) Oral every 8 hours  atovaquone Suspension 750 milliGRAM(s) Oral every 12 hours  caspofungin IVPB      caspofungin IVPB 50 milliGRAM(s) IV Intermittent every 24 hours  levoFLOXacin  Tablet 500 milliGRAM(s) Oral every 24 hours      STANDING MEDICATIONS  antithymocyte globulin equine Skin Test 0.1 milliLiter(s) IntraDermal once  Biotene Dry Mouth Oral Rinse 5 milliLiter(s) Swish and Spit five times a day  calcium acetate 1334 milliGRAM(s) Oral four times a day with meals  cycloSPORINE  (SandIMMUNE)  Solution 600 milliGRAM(s) Oral every 12 hours  famotidine    Tablet 20 milliGRAM(s) Oral two times a day  predniSONE   Tablet 60 milliGRAM(s) Oral daily  sodium chloride 0.9%. 1000 milliLiter(s) IV Continuous <Continuous>  sucralfate suspension 1 Gram(s) Oral every 6 hours      PRN MEDICATIONS  acetaminophen   Tablet .. 650 milliGRAM(s) Oral every 6 hours PRN  acetaminophen   Tablet .. 650 milliGRAM(s) Oral once PRN  aluminum hydroxide/magnesium hydroxide/simethicone Suspension 30 milliLiter(s) Oral every 4 hours PRN  EPINEPHrine     1 mG/mL Injectable 0.3 milliGRAM(s) IntraMuscular once PRN  methylPREDNISolone sodium succinate IVPB 500 milliGRAM(s) IV Intermittent once PRN  ondansetron Injectable 4 milliGRAM(s) IV Push every 6 hours PRN      Vital Signs Last 24 Hrs  T(C): 36.4 (07 May 2020 05:13), Max: 36.7 (06 May 2020 08:39)  T(F): 97.6 (07 May 2020 05:13), Max: 98.1 (06 May 2020 08:39)  HR: 69 (07 May 2020 05:13) (67 - 82)  BP: 130/77 (07 May 2020 05:13) (124/83 - 151/90)  BP(mean): --  RR: 18 (07 May 2020 05:13) (18 - 18)  SpO2: 99% (07 May 2020 05:13) (96% - 99%)      PHYSICAL EXAM  General: NAD  HEENT: clear oropharynx  CV: (+) S1/S2 RRR  Lungs: clear to auscultation, no wheezes or rales  Abdomen: soft, non-tender, non-distended (+) BS  Ext: no edema  Skin: no rashes   Neuro: alert and oriented X 3  PIV: C/D/I         LABS:                        8.1    1.60  )-----------( 60       ( 07 May 2020 06:52 )             22.8         Mean Cell Volume : 84.4 fl  Mean Cell Hemoglobin : 30.0 pg  Mean Cell Hemoglobin Concentration : 35.5 gm/dL  Auto Neutrophil # : x  Auto Lymphocyte # : x  Auto Monocyte # : x  Auto Eosinophil # : x  Auto Basophil # : x  Auto Neutrophil % : x  Auto Lymphocyte % : x  Auto Monocyte % : x  Auto Eosinophil % : x  Auto Basophil % : x      05-07    137  |  101  |  24<H>  ----------------------------<  100<H>  3.9   |  24  |  0.86    Ca    9.0      07 May 2020 06:52  Phos  5.7     05-07  Mg     1.6     05-07    TPro  5.9<L>  /  Alb  3.2<L>  /  TBili  1.9<H>  /  DBili  x   /  AST  44<H>  /  ALT  331<H>  /  AlkPhos  90  05-07      Uric Acid 3.1      RADIOLOGY & ADDITIONAL STUDIES:    from: US Abdomen Upper Quadrant Right (05.04.20 @ 09:45)   IMPRESSION:   Normal right upper quadrant abdominal ultrasound. Diagnosis: Aplastic anemia    Protocol/Chemo Regimen: ATG/Cyclosporine/Prednisone (Previously received Solumedrol Days 1-4) Promacta on hold as of today for Grade 3 transaminitis    Day: 15    Pt endorsed: nausea; lips tingling 1-2 times in the past week; abdominal pain improved; right eye haziness much better     Review of Systems: Patient denies headache, dizziness, chest pain, palpitations, SOB, nausea, vomiting, diarrhea or dysuria.    Pain scale: 1/10 abd this am, none now     Diet: Regular, ensure enlive qd    Allergies: No Known Allergies      ANTIMICROBIALS  acyclovir   Oral Tab/Cap 400 milliGRAM(s) Oral every 8 hours  atovaquone Suspension 750 milliGRAM(s) Oral every 12 hours  caspofungin IVPB      caspofungin IVPB 50 milliGRAM(s) IV Intermittent every 24 hours  levoFLOXacin  Tablet 500 milliGRAM(s) Oral every 24 hours      STANDING MEDICATIONS  antithymocyte globulin equine Skin Test 0.1 milliLiter(s) IntraDermal once  Biotene Dry Mouth Oral Rinse 5 milliLiter(s) Swish and Spit five times a day  calcium acetate 1334 milliGRAM(s) Oral four times a day with meals  cycloSPORINE  (SandIMMUNE)  Solution 600 milliGRAM(s) Oral every 12 hours  famotidine    Tablet 20 milliGRAM(s) Oral two times a day  predniSONE   Tablet 60 milliGRAM(s) Oral daily  sodium chloride 0.9%. 1000 milliLiter(s) IV Continuous <Continuous>  sucralfate suspension 1 Gram(s) Oral every 6 hours      PRN MEDICATIONS  acetaminophen   Tablet .. 650 milliGRAM(s) Oral every 6 hours PRN  acetaminophen   Tablet .. 650 milliGRAM(s) Oral once PRN  aluminum hydroxide/magnesium hydroxide/simethicone Suspension 30 milliLiter(s) Oral every 4 hours PRN  EPINEPHrine     1 mG/mL Injectable 0.3 milliGRAM(s) IntraMuscular once PRN  methylPREDNISolone sodium succinate IVPB 500 milliGRAM(s) IV Intermittent once PRN  ondansetron Injectable 4 milliGRAM(s) IV Push every 6 hours PRN      Vital Signs Last 24 Hrs  T(C): 36.4 (07 May 2020 05:13), Max: 36.7 (06 May 2020 08:39)  T(F): 97.6 (07 May 2020 05:13), Max: 98.1 (06 May 2020 08:39)  HR: 69 (07 May 2020 05:13) (67 - 82)  BP: 130/77 (07 May 2020 05:13) (124/83 - 151/90)  BP(mean): --  RR: 18 (07 May 2020 05:13) (18 - 18)  SpO2: 99% (07 May 2020 05:13) (96% - 99%)      PHYSICAL EXAM  General: NAD  HEENT: clear oropharynx  CV: (+) S1/S2 RRR  Lungs: clear to auscultation, no wheezes or rales  Abdomen: soft, non-tender, non-distended (+) BS  Ext: no edema  Skin: no rashes   Neuro: alert and oriented X 3  PIV: C/D/I       LABS:                        8.1    1.60  )-----------( 60       ( 07 May 2020 06:52 )             22.8         Mean Cell Volume : 84.4 fl  Mean Cell Hemoglobin : 30.0 pg  Mean Cell Hemoglobin Concentration : 35.5 gm/dL  Auto Neutrophil # : 0.19 K/uL  Auto Lymphocyte # : 1.34 K/uL  Auto Monocyte # : 0.01 K/uL  Auto Eosinophil # : 0.01 K/uL  Auto Basophil # : 0.00 K/uL  Auto Neutrophil % : 10.8 %  Auto Lymphocyte % : 83.8 %  Auto Monocyte % : 0.9 %  Auto Eosinophil % : 0.9 %  Auto Basophil % : 0.0 %      05-07    137  |  101  |  24<H>  ----------------------------<  100<H>  3.9   |  24  |  0.86    Ca    9.0      07 May 2020 06:52  Phos  5.7     05-07  Mg     1.6     05-07    TPro  5.9<L>  /  Alb  3.2<L>  /  TBili  1.9<H>  /  DBili  x   /  AST  44<H>  /  ALT  331<H>  /  AlkPhos  90  05-07      PT/INR - ( 07 May 2020 08:35 )   PT: 10.5 sec;   INR: 0.93 ratio         PTT - ( 07 May 2020 08:35 )  PTT:23.9 sec      Uric Acid 3.1      RADIOLOGY & ADDITIONAL STUDIES:    from: US Abdomen Upper Quadrant Right (05.04.20 @ 09:45)   IMPRESSION:   Normal right upper quadrant abdominal ultrasound.

## 2020-05-07 NOTE — PROGRESS NOTE ADULT - ASSESSMENT
This is a 35 yo M with no significant PMHx admitted for management of newly diagnosed Aplastic Anemia. The patient is status post Equine ATG and continues CSA/Prednisone/Eltrombopag. The patients hospital course has been complicated by retinal hemorrhage, ATG reaction and liver dysfunction with hyperbilirubinemia and grade 3 transaminitis likely drug induced. The patient has pancytopenia secondary to chemotherapy and/or disease process.

## 2020-05-07 NOTE — PROGRESS NOTE ADULT - PROBLEM SELECTOR PLAN 3
Grade 3 transaminitis with hyperbilirubinemia, seems to have plateaued   Posaconazole changed to caspofungin  Promacta on hold as of 5/1. Will re-evaluate when to restart  s/p US Abdomen 5/4- unremarkable Grade 3 transaminitis with hyperbilirubinemia, improving slowly  Posaconazole changed to caspofungin  Promacta on hold as of 5/1. Will re-evaluate when to restart  s/p US Abdomen 5/4- unremarkable

## 2020-05-07 NOTE — PROGRESS NOTE ADULT - ATTENDING COMMENTS
34M with no PMH presented to OSH with dizziness x 2 weeks, easy bruising x 2-4 weeks. At home had a syncopal event and called 911, found to have hemoglobin 3s, Platelets 2K, no evidence of DIC. Noted mild petechiae and has had gum bleeding. These have improved. Noted dark stools for 2 weeks.   -Bone marrow evaluation (4/20) c/w severe aplastic anemia. Treatment plan: hATG, Cyclosporine, Eltrombopag.   - ATG test dose negative. Begin ATG, CSA, Eltrombopag on 4/23, today is day 14  -patient developed hives and short episode of wheezing with ATG infusion, which resolved- will continue Solumedrol pre-med with ATG(hold Prednisone), then resume Prednisone on day 5 to complete recommended course for prevention of serum sickness.  - IVF, mouth care; discontinued Allopurinol  -receiving transfusions if Hgb< 7, Plt <50k   - s/p vit K for elevated INR  -optho consult given hazy vision, suspect retinal bleed given low platelets. Platelet transfusional support for goal >50k.   -monitor for s/s of bleeding, gum bleeding intermittent   -Paranasal sinusitis on CT head(4/17)- cont Levaquin, begin Posaconazole; on Mepron and Acyclovir with immunosuppressive therapy.  -LFTs increasing, had switched posaconazole to caspofungin on 4/29 and when still uptrending subsequently held promacta on 5/1. Ordered abdominal sono on 5/3 - normal.  LFTs improving  -some abdominal discomfort, likely gastritis type symptoms due to steroid, increased pepcid to BID and added carafate.  -Carafate helped significantly with abdominal symptoms, which are now mostly resolved.   -Cyclosporine level 192 5/5, cont same dose 34M with no PMH presented to OSH with dizziness x 2 weeks, easy bruising x 2-4 weeks. At home had a syncopal event and called 911, found to have hemoglobin 3s, Platelets 2K, no evidence of DIC. Noted mild petechiae and has had gum bleeding. These have improved. Noted dark stools for 2 weeks.   -Bone marrow evaluation (4/20) c/w severe aplastic anemia. Treatment plan: hATG, Cyclosporine, Eltrombopag.   - ATG test dose negative. Begin ATG, CSA, Eltrombopag on 4/23, today is day 15  -patient developed hives and short episode of wheezing with ATG infusion, which resolved- will continue Solumedrol pre-med with ATG(hold Prednisone), then resume Prednisone on day 5 to complete recommended course for prevention of serum sickness. Now on steroid taper  - IVF, mouth care; discontinued Allopurinol  -receiving transfusions if Hgb< 7, Plt <50k   - s/p vit K for elevated INR  -optho consult given hazy vision, suspect retinal bleed given low platelets. Platelet transfusional support for goal >50k.   -monitor for s/s of bleeding, gum bleeding intermittent   -Paranasal sinusitis on CT head(4/17)- cont Levaquin, begin Posaconazole; on Mepron and Acyclovir with immunosuppressive therapy.  -LFTs increasing, had switched posaconazole to caspofungin on 4/29 and when still uptrending subsequently held promacta on 5/1. Ordered abdominal sono on 5/3 - normal.  LFTs improving  -some abdominal discomfort, likely gastritis type symptoms due to steroid, increased pepcid to BID and added carafate.  -Carafate helped significantly with abdominal symptoms, which are now mostly resolved.   -Cyclosporine level 192 5/5, cont same dose. Will repeat tomorrow

## 2020-05-07 NOTE — PROGRESS NOTE ADULT - PROBLEM SELECTOR PLAN 1
4/20 BM Bx consistent with Aplastic Anemia  Status post Horse ATG 40mg/kg/day daily x 4 days (4/23- 4/26)  Continue Prednisone (taper to continue through 5/17) and CSA   CSA level today 192. will continue same dose,  increased to 600mg PO q12 on 4/30  Promacta on hold for increasing liver disfunction  Monitor CBC/Lytes and transfuse/replete PRN  Keep PLT> 50k for retinal hemorrhage  Thrombocytopenia: PLT x1 today  Hyperphosphatemia: increased Phoslo to 1334 mg x 5 doses   Strict Is and Os/Daily weights/Mouth Care  IVF  Awaiting approval for outpatient medications cyclosporine and promacta, letter of medical necessity submitted 4/20 BM Bx consistent with Aplastic Anemia  Status post Horse ATG 40mg/kg/day daily x 4 days (4/23- 4/26)  Continue Prednisone (taper to continue through 5/17) and CSA   CSA level today 192. will continue same dose,  increased to 600mg PO q12 on 4/30  Promacta on hold for increasing liver disfunction, still grade 3 ALT  Monitor CBC/Lytes and transfuse/replete PRN  Keep PLT> 50k for retinal hemorrhage  Hyperphosphatemia: increased Phoslo to 1334 mg x 5 doses   Strict Is and Os/Daily weights/Mouth Care  IVF  Awaiting approval for outpatient medications cyclosporine and promacta, letter of medical necessity submitted

## 2020-05-08 LAB
ALBUMIN SERPL ELPH-MCNC: 3.2 G/DL — LOW (ref 3.3–5)
ALP SERPL-CCNC: 92 U/L — SIGNIFICANT CHANGE UP (ref 40–120)
ALT FLD-CCNC: 296 U/L — HIGH (ref 10–45)
ANION GAP SERPL CALC-SCNC: 11 MMOL/L — SIGNIFICANT CHANGE UP (ref 5–17)
AST SERPL-CCNC: 49 U/L — HIGH (ref 10–40)
BASOPHILS # BLD AUTO: 0 K/UL — SIGNIFICANT CHANGE UP (ref 0–0.2)
BASOPHILS NFR BLD AUTO: 0 % — SIGNIFICANT CHANGE UP (ref 0–2)
BILIRUB SERPL-MCNC: 2.2 MG/DL — HIGH (ref 0.2–1.2)
BUN SERPL-MCNC: 21 MG/DL — SIGNIFICANT CHANGE UP (ref 7–23)
CALCIUM SERPL-MCNC: 8.8 MG/DL — SIGNIFICANT CHANGE UP (ref 8.4–10.5)
CHLORIDE SERPL-SCNC: 100 MMOL/L — SIGNIFICANT CHANGE UP (ref 96–108)
CO2 SERPL-SCNC: 26 MMOL/L — SIGNIFICANT CHANGE UP (ref 22–31)
CREAT SERPL-MCNC: 0.84 MG/DL — SIGNIFICANT CHANGE UP (ref 0.5–1.3)
CYCLOSPORINE SER-MCNC: 283 NG/ML — SIGNIFICANT CHANGE UP (ref 150–400)
EOSINOPHIL # BLD AUTO: 0 K/UL — SIGNIFICANT CHANGE UP (ref 0–0.5)
EOSINOPHIL NFR BLD AUTO: 0 % — SIGNIFICANT CHANGE UP (ref 0–6)
GLUCOSE SERPL-MCNC: 96 MG/DL — SIGNIFICANT CHANGE UP (ref 70–99)
HCT VFR BLD CALC: 21.1 % — LOW (ref 39–50)
HGB BLD-MCNC: 7.4 G/DL — LOW (ref 13–17)
LDH SERPL L TO P-CCNC: 140 U/L — SIGNIFICANT CHANGE UP (ref 50–242)
LYMPHOCYTES # BLD AUTO: 1.4 K/UL — SIGNIFICANT CHANGE UP (ref 1–3.3)
LYMPHOCYTES # BLD AUTO: 85 % — HIGH (ref 13–44)
MAGNESIUM SERPL-MCNC: 1.5 MG/DL — LOW (ref 1.6–2.6)
MCHC RBC-ENTMCNC: 29.5 PG — SIGNIFICANT CHANGE UP (ref 27–34)
MCHC RBC-ENTMCNC: 35.1 GM/DL — SIGNIFICANT CHANGE UP (ref 32–36)
MCV RBC AUTO: 84.1 FL — SIGNIFICANT CHANGE UP (ref 80–100)
MONOCYTES # BLD AUTO: 0.1 K/UL — SIGNIFICANT CHANGE UP (ref 0–0.9)
MONOCYTES NFR BLD AUTO: 6 % — SIGNIFICANT CHANGE UP (ref 2–14)
NEUTROPHILS # BLD AUTO: 0.12 K/UL — LOW (ref 1.8–7.4)
NEUTROPHILS NFR BLD AUTO: 7 % — LOW (ref 43–77)
PHOSPHATE SERPL-MCNC: 5.8 MG/DL — HIGH (ref 2.5–4.5)
PLATELET # BLD AUTO: 43 K/UL — LOW (ref 150–400)
POTASSIUM SERPL-MCNC: 4.3 MMOL/L — SIGNIFICANT CHANGE UP (ref 3.5–5.3)
POTASSIUM SERPL-SCNC: 4.3 MMOL/L — SIGNIFICANT CHANGE UP (ref 3.5–5.3)
PROT SERPL-MCNC: 5.9 G/DL — LOW (ref 6–8.3)
RBC # BLD: 2.51 M/UL — LOW (ref 4.2–5.8)
RBC # FLD: 12.9 % — SIGNIFICANT CHANGE UP (ref 10.3–14.5)
SODIUM SERPL-SCNC: 137 MMOL/L — SIGNIFICANT CHANGE UP (ref 135–145)
URATE SERPL-MCNC: 3.1 MG/DL — LOW (ref 3.4–8.8)
WBC # BLD: 1.65 K/UL — LOW (ref 3.8–10.5)
WBC # FLD AUTO: 1.65 K/UL — LOW (ref 3.8–10.5)

## 2020-05-08 PROCEDURE — 99232 SBSQ HOSP IP/OBS MODERATE 35: CPT | Mod: GC

## 2020-05-08 RX ORDER — CALCIUM ACETATE 667 MG
667 TABLET ORAL
Refills: 0 | Status: COMPLETED | OUTPATIENT
Start: 2020-05-08 | End: 2020-05-08

## 2020-05-08 RX ORDER — MAGNESIUM SULFATE 500 MG/ML
2 VIAL (ML) INJECTION ONCE
Refills: 0 | Status: COMPLETED | OUTPATIENT
Start: 2020-05-08 | End: 2020-05-08

## 2020-05-08 RX ADMIN — Medication 60 MILLIGRAM(S): at 05:27

## 2020-05-08 RX ADMIN — Medication 50 GRAM(S): at 11:23

## 2020-05-08 RX ADMIN — Medication 667 MILLIGRAM(S): at 15:03

## 2020-05-08 RX ADMIN — Medication 30 MILLILITER(S): at 23:57

## 2020-05-08 RX ADMIN — Medication 5 MILLILITER(S): at 20:04

## 2020-05-08 RX ADMIN — Medication 5 MILLILITER(S): at 00:16

## 2020-05-08 RX ADMIN — FAMOTIDINE 20 MILLIGRAM(S): 10 INJECTION INTRAVENOUS at 18:12

## 2020-05-08 RX ADMIN — Medication 400 MILLIGRAM(S): at 21:43

## 2020-05-08 RX ADMIN — CYCLOSPORINE 600 MILLIGRAM(S): 100 CAPSULE ORAL at 05:28

## 2020-05-08 RX ADMIN — Medication 1 GRAM(S): at 00:16

## 2020-05-08 RX ADMIN — Medication 5 MILLILITER(S): at 23:56

## 2020-05-08 RX ADMIN — ATOVAQUONE 750 MILLIGRAM(S): 750 SUSPENSION ORAL at 18:12

## 2020-05-08 RX ADMIN — Medication 1 GRAM(S): at 05:27

## 2020-05-08 RX ADMIN — CASPOFUNGIN ACETATE 260 MILLIGRAM(S): 7 INJECTION, POWDER, LYOPHILIZED, FOR SOLUTION INTRAVENOUS at 09:15

## 2020-05-08 RX ADMIN — Medication 5 MILLILITER(S): at 11:23

## 2020-05-08 RX ADMIN — Medication 1 GRAM(S): at 23:56

## 2020-05-08 RX ADMIN — Medication 5 MILLILITER(S): at 09:16

## 2020-05-08 RX ADMIN — ATOVAQUONE 750 MILLIGRAM(S): 750 SUSPENSION ORAL at 05:27

## 2020-05-08 RX ADMIN — Medication 400 MILLIGRAM(S): at 15:04

## 2020-05-08 RX ADMIN — Medication 5 MILLILITER(S): at 15:03

## 2020-05-08 RX ADMIN — Medication 1334 MILLIGRAM(S): at 09:19

## 2020-05-08 RX ADMIN — Medication 667 MILLIGRAM(S): at 18:13

## 2020-05-08 RX ADMIN — Medication 400 MILLIGRAM(S): at 05:27

## 2020-05-08 RX ADMIN — Medication 1 GRAM(S): at 11:24

## 2020-05-08 RX ADMIN — CYCLOSPORINE 600 MILLIGRAM(S): 100 CAPSULE ORAL at 18:10

## 2020-05-08 RX ADMIN — FAMOTIDINE 20 MILLIGRAM(S): 10 INJECTION INTRAVENOUS at 05:27

## 2020-05-08 RX ADMIN — Medication 1 GRAM(S): at 18:12

## 2020-05-08 NOTE — PROGRESS NOTE ADULT - PROBLEM SELECTOR PLAN 3
Grade 3 transaminitis with hyperbilirubinemia, improving slowly  Posaconazole changed to caspofungin  Promacta on hold as of 5/1. Will re-evaluate when to restart  s/p US Abdomen 5/4- unremarkable

## 2020-05-08 NOTE — PROGRESS NOTE ADULT - PROBLEM SELECTOR PLAN 1
4/20 BM Bx consistent with Aplastic Anemia  Status post Horse ATG 40mg/kg/day daily x 4 days (4/23- 4/26)  Continue Prednisone (taper to continue through 5/17) and CSA   5/5 CSA level 192. will continue same dose,  increased to 600mg PO q12 on 4/30,   Promacta on hold for increasing liver disfunction, still grade 3 ALT  Monitor CBC/Lytes and transfuse/replete PRN  Keep PLT> 50k for retinal hemorrhage  Hyperphosphatemia: increased Phoslo to 1334 mg x 5 doses   Strict Is and Os/Daily weights/Mouth Care  IVF  Awaiting approval for outpatient medications cyclosporine and promacta, letter of medical necessity submitted 4/20 BM Bx consistent with Aplastic Anemia  Status post Horse ATG 40mg/kg/day daily x 4 days (4/23- 4/26)  Continue Prednisone (taper to continue through 5/17) and CSA   5/5 CSA level 192. will continue same dose,  increased to 600mg PO q12 on 4/30,   Promacta on hold for increasing liver disfunction, still grade 3 ALT  Monitor CBC/Lytes and transfuse/replete PRN  Keep PLT> 50k for retinal hemorrhage  platelet x 1 unit   Hyperphosphatemia: increased Phoslo 1 tab x 2 doses   Hypomagnesemia - magnesium sulphate 2 g IV x 1  Strict Is and Os/Daily weights/Mouth Care  IVF  Awaiting approval for outpatient medications cyclosporine and promacta, letter of medical necessity submitted 4/20 BM Bx consistent with Aplastic Anemia  Status post Horse ATG 40mg/kg/day daily x 4 days (4/23- 4/26)  Continue Prednisone (taper to continue through 5/17) and CSA   5/8 CSA level 283. will continue same dose,  increased to 600mg PO q12 on 4/30,  Follow up next level on 5/13/20  Promacta on hold for increasing liver disfunction, still grade 3 ALT  Monitor CBC/Lytes and transfuse/replete PRN  Keep PLT> 50k for retinal hemorrhage  platelet x 1 unit   Hyperphosphatemia: Phoslo 1 tab x 2 doses   Hypomagnesemia - magnesium sulphate 2 g IV x 1  Strict Is and Os/Daily weights/Mouth Care  IVF  Awaiting approval for outpatient medications cyclosporine and promacta, letter of medical necessity submitted

## 2020-05-08 NOTE — PROGRESS NOTE ADULT - ATTENDING COMMENTS
34M with no PMH presented to OSH with dizziness x 2 weeks, easy bruising x 2-4 weeks. At home had a syncopal event and called 911, found to have hemoglobin 3s, Platelets 2K, no evidence of DIC. Noted mild petechiae and has had gum bleeding. These have improved. Noted dark stools for 2 weeks.   -Bone marrow evaluation (4/20) c/w severe aplastic anemia. Treatment plan: hATG, Cyclosporine, Eltrombopag.   - ATG test dose negative. Begin ATG, CSA, Eltrombopag on 4/23, today is day 15  -patient developed hives and short episode of wheezing with ATG infusion, which resolved- will continue Solumedrol pre-med with ATG(hold Prednisone), then resume Prednisone on day 5 to complete recommended course for prevention of serum sickness. Now on steroid taper  - IVF, mouth care; discontinued Allopurinol  -receiving transfusions if Hgb< 7, Plt <50k   - s/p vit K for elevated INR  -optho consult given hazy vision, suspect retinal bleed given low platelets. Platelet transfusional support for goal >50k.   -monitor for s/s of bleeding, gum bleeding intermittent   -Paranasal sinusitis on CT head(4/17)- cont Levaquin, begin Posaconazole; on Mepron and Acyclovir with immunosuppressive therapy.  -LFTs increasing, had switched posaconazole to caspofungin on 4/29 and when still uptrending subsequently held promacta on 5/1. Ordered abdominal sono on 5/3 - normal.  LFTs improving  -some abdominal discomfort, likely gastritis type symptoms due to steroid, increased pepcid to BID and added carafate.  -Carafate helped significantly with abdominal symptoms, which are now mostly resolved.   -Cyclosporine level 192 5/5, cont same dose. Will repeat tomorrow 33yo M with no PMH presented to OSH with dizziness x 2 weeks, easy bruising x 2-4 weeks. At home had a syncopal event and called 911, found to have hemoglobin 3s, Platelets 2K, no evidence of DIC. Noted mild petechiae and has had gum bleeding. These have improved. Noted dark stools for 2 weeks.   -Bone marrow evaluation (4/20) c/w severe aplastic anemia. Treatment plan: hATG, Cyclosporine, Eltrombopag.   - ATG test dose negative. Begin ATG, CSA, Eltrombopag on 4/23, today is day 16  -patient developed hives and short episode of wheezing with ATG infusion, which resolved- will continue Solumedrol pre-med with ATG(hold Prednisone), then resume Prednisone on day 5 to complete recommended course for prevention of serum sickness. Now on steroid taper  - IVF, mouth care; discontinued Allopurinol  -receiving transfusions if Hgb< 7, Plt <50k   - s/p vit K for elevated INR  -optho consult given hazy vision, suspect retinal bleed given low platelets. Platelet transfusional support for goal >50k.   -monitor for s/s of bleeding, gum bleeding intermittent   -Paranasal sinusitis on CT head(4/17)- cont Levaquin, begin Posaconazole; on Mepron and Acyclovir with immunosuppressive therapy.  -LFTs increasing, had switched posaconazole to caspofungin on 4/29 and when still uptrending subsequently held promacta on 5/1. Ordered abdominal sono on 5/3 - normal.  LFTs improving  -some abdominal discomfort, likely gastritis type symptoms due to steroid, increased pepcid to BID and added carafate.  -Carafate helped significantly with abdominal symptoms, which are now mostly resolved.   -Cyclosporine level 283 5/8, cont same dose. Will repeat next week

## 2020-05-08 NOTE — PROGRESS NOTE ADULT - SUBJECTIVE AND OBJECTIVE BOX
Diagnosis: Aplastic anemia    Protocol/Chemo Regimen: ATG/Cyclosporine/Prednisone (Previously received Solumedrol Days 1-4) Promacta on hold as of today for Grade 3 transaminitis    Day: 16    Pt endorsed: nausea; lips tingling 1-2 times in the past week; abdominal pain improved; right eye haziness much better     Review of Systems: Patient denies headache, dizziness, chest pain, palpitations, SOB, nausea, vomiting, diarrhea or dysuria.    Pain scale: denies now     Diet: Regular, ensure enlive qd    Allergies    No Known Allergies    Intolerances        ANTIMICROBIALS  acyclovir   Oral Tab/Cap 400 milliGRAM(s) Oral every 8 hours  atovaquone Suspension 750 milliGRAM(s) Oral every 12 hours  caspofungin IVPB      caspofungin IVPB 50 milliGRAM(s) IV Intermittent every 24 hours  levoFLOXacin  Tablet 500 milliGRAM(s) Oral every 24 hours      HEME/ONC MEDICATIONS      STANDING MEDICATIONS  antithymocyte globulin equine Skin Test 0.1 milliLiter(s) IntraDermal once  Biotene Dry Mouth Oral Rinse 5 milliLiter(s) Swish and Spit five times a day  calcium acetate 1334 milliGRAM(s) Oral four times a day with meals  calcium acetate 667 milliGRAM(s) Oral four times a day with meals  cycloSPORINE  (SandIMMUNE)  Solution 600 milliGRAM(s) Oral every 12 hours  famotidine    Tablet 20 milliGRAM(s) Oral two times a day  magnesium sulfate  IVPB 2 Gram(s) IV Intermittent once  sodium chloride 0.9%. 1000 milliLiter(s) IV Continuous <Continuous>  sucralfate suspension 1 Gram(s) Oral every 6 hours      PRN MEDICATIONS  acetaminophen   Tablet .. 650 milliGRAM(s) Oral every 6 hours PRN  aluminum hydroxide/magnesium hydroxide/simethicone Suspension 30 milliLiter(s) Oral every 4 hours PRN  EPINEPHrine     1 mG/mL Injectable 0.3 milliGRAM(s) IntraMuscular once PRN  methylPREDNISolone sodium succinate IVPB 500 milliGRAM(s) IV Intermittent once PRN  ondansetron Injectable 4 milliGRAM(s) IV Push every 6 hours PRN        Vital Signs Last 24 Hrs  T(C): 36.7 (08 May 2020 04:55), Max: 36.7 (08 May 2020 04:55)  T(F): 98 (08 May 2020 04:55), Max: 98 (08 May 2020 04:55)  HR: 61 (08 May 2020 04:55) (61 - 75)  BP: 136/82 (08 May 2020 04:55) (131/81 - 140/87)  BP(mean): --  RR: 18 (08 May 2020 04:55) (17 - 18)  SpO2: 99% (08 May 2020 04:55) (96% - 99%)    PHYSICAL EXAM  General: NAD  HEENT:  clear oropharynx, anicteric sclera  CV: (+) S1/S2 RRR  Lungs: clear to auscultation, no wheezes or rales  Abdomen: soft, non-tender, non-distended (+) BS x 4Q  Ext: no edema  Skin: no rash  Neuro: alert and oriented X 3  Central Line: PIVL CDI           LABS:                        7.4    1.65  )-----------( 43       ( 08 May 2020 05:05 )             21.1         Mean Cell Volume : 84.1 fl  Mean Cell Hemoglobin : 29.5 pg  Mean Cell Hemoglobin Concentration : 35.1 gm/dL  Auto Neutrophil # : 0.12 K/uL  Auto Lymphocyte # : 1.40 K/uL  Auto Monocyte # : 0.10 K/uL  Auto Eosinophil # : 0.00 K/uL  Auto Basophil # : 0.00 K/uL  Auto Neutrophil % : 7.0 %  Auto Lymphocyte % : 85.0 %  Auto Monocyte % : 6.0 %  Auto Eosinophil % : 0.0 %  Auto Basophil % : 0.0 %      05-08    137  |  100  |  21  ----------------------------<  96  4.3   |  26  |  0.84    Ca    8.8      08 May 2020 05:05  Phos  5.8     05-08  Mg     1.5     05-08    TPro  5.9<L>  /  Alb  3.2<L>  /  TBili  2.2<H>  /  DBili  x   /  AST  49<H>  /  ALT  296<H>  /  AlkPhos  92  05-08        PT/INR - ( 07 May 2020 08:35 )   PT: 10.5 sec;   INR: 0.93 ratio         PTT - ( 07 May 2020 08:35 )  PTT:23.9 sec      Uric Acid 3.1        RADIOLOGY & ADDITIONAL STUDIES:    from: US Abdomen Upper Quadrant Right (05.04.20 @ 09:45)   IMPRESSION:   Normal right upper quadrant abdominal ultrasound. Diagnosis: Aplastic anemia    Protocol/Chemo Regimen: ATG/Cyclosporine/Prednisone (Previously received Solumedrol Days 1-4) Promacta on hold as of today for Grade 3 transaminitis    Day: 16    Pt endorsed: right abdomen pain yesterday    Review of Systems: Patient denies headache, dizziness, chest pain, palpitations, SOB, nausea, vomiting, diarrhea or dysuria.    Pain scale: denies now     Diet: Regular, ensure enlive qd    Allergies    No Known Allergies    Intolerances        ANTIMICROBIALS  acyclovir   Oral Tab/Cap 400 milliGRAM(s) Oral every 8 hours  atovaquone Suspension 750 milliGRAM(s) Oral every 12 hours  caspofungin IVPB      caspofungin IVPB 50 milliGRAM(s) IV Intermittent every 24 hours  levoFLOXacin  Tablet 500 milliGRAM(s) Oral every 24 hours      HEME/ONC MEDICATIONS      STANDING MEDICATIONS  antithymocyte globulin equine Skin Test 0.1 milliLiter(s) IntraDermal once  Biotene Dry Mouth Oral Rinse 5 milliLiter(s) Swish and Spit five times a day  calcium acetate 1334 milliGRAM(s) Oral four times a day with meals  calcium acetate 667 milliGRAM(s) Oral four times a day with meals  cycloSPORINE  (SandIMMUNE)  Solution 600 milliGRAM(s) Oral every 12 hours  famotidine    Tablet 20 milliGRAM(s) Oral two times a day  magnesium sulfate  IVPB 2 Gram(s) IV Intermittent once  sodium chloride 0.9%. 1000 milliLiter(s) IV Continuous <Continuous>  sucralfate suspension 1 Gram(s) Oral every 6 hours      PRN MEDICATIONS  acetaminophen   Tablet .. 650 milliGRAM(s) Oral every 6 hours PRN  aluminum hydroxide/magnesium hydroxide/simethicone Suspension 30 milliLiter(s) Oral every 4 hours PRN  EPINEPHrine     1 mG/mL Injectable 0.3 milliGRAM(s) IntraMuscular once PRN  methylPREDNISolone sodium succinate IVPB 500 milliGRAM(s) IV Intermittent once PRN  ondansetron Injectable 4 milliGRAM(s) IV Push every 6 hours PRN        Vital Signs Last 24 Hrs  T(C): 36.7 (08 May 2020 04:55), Max: 36.7 (08 May 2020 04:55)  T(F): 98 (08 May 2020 04:55), Max: 98 (08 May 2020 04:55)  HR: 61 (08 May 2020 04:55) (61 - 75)  BP: 136/82 (08 May 2020 04:55) (131/81 - 140/87)  BP(mean): --  RR: 18 (08 May 2020 04:55) (17 - 18)  SpO2: 99% (08 May 2020 04:55) (96% - 99%)    PHYSICAL EXAM  General: NAD  HEENT:  clear oropharynx, +icteric sclera  CV: (+) S1/S2 RRR  Lungs: clear to auscultation, no wheezes or rales  Abdomen: soft, non-tender, non-distended (+) BS x 4Q  Ext: no edema  Skin: no rash  Neuro: alert and oriented X 3  Central Line: PIVL CDI           LABS:                        7.4    1.65  )-----------( 43       ( 08 May 2020 05:05 )             21.1         Mean Cell Volume : 84.1 fl  Mean Cell Hemoglobin : 29.5 pg  Mean Cell Hemoglobin Concentration : 35.1 gm/dL  Auto Neutrophil # : 0.12 K/uL  Auto Lymphocyte # : 1.40 K/uL  Auto Monocyte # : 0.10 K/uL  Auto Eosinophil # : 0.00 K/uL  Auto Basophil # : 0.00 K/uL  Auto Neutrophil % : 7.0 %  Auto Lymphocyte % : 85.0 %  Auto Monocyte % : 6.0 %  Auto Eosinophil % : 0.0 %  Auto Basophil % : 0.0 %      05-08    137  |  100  |  21  ----------------------------<  96  4.3   |  26  |  0.84    Ca    8.8      08 May 2020 05:05  Phos  5.8     05-08  Mg     1.5     05-08    TPro  5.9<L>  /  Alb  3.2<L>  /  TBili  2.2<H>  /  DBili  x   /  AST  49<H>  /  ALT  296<H>  /  AlkPhos  92  05-08        PT/INR - ( 07 May 2020 08:35 )   PT: 10.5 sec;   INR: 0.93 ratio         PTT - ( 07 May 2020 08:35 )  PTT:23.9 sec      Uric Acid 3.1        RADIOLOGY & ADDITIONAL STUDIES:    from: US Abdomen Upper Quadrant Right (05.04.20 @ 09:45)   IMPRESSION:   Normal right upper quadrant abdominal ultrasound. Diagnosis: Aplastic anemia    Protocol/Chemo Regimen: ATG/Cyclosporine/Prednisone (Previously received Solumedrol Days 1-4) Promacta on hold as of today for Grade 3 transaminitis    Day: 16    Pt endorsed: right abdomen pain yesterday    Review of Systems: Patient denies headache, dizziness, chest pain, palpitations, SOB, nausea, vomiting, diarrhea or dysuria.    Pain scale: denies now     Diet: Regular, ensure enlive qd    Allergies    No Known Allergies    Intolerances        ANTIMICROBIALS  acyclovir   Oral Tab/Cap 400 milliGRAM(s) Oral every 8 hours  atovaquone Suspension 750 milliGRAM(s) Oral every 12 hours  caspofungin IVPB      caspofungin IVPB 50 milliGRAM(s) IV Intermittent every 24 hours  levoFLOXacin  Tablet 500 milliGRAM(s) Oral every 24 hours      HEME/ONC MEDICATIONS      STANDING MEDICATIONS  antithymocyte globulin equine Skin Test 0.1 milliLiter(s) IntraDermal once  Biotene Dry Mouth Oral Rinse 5 milliLiter(s) Swish and Spit five times a day  calcium acetate 1334 milliGRAM(s) Oral four times a day with meals  calcium acetate 667 milliGRAM(s) Oral four times a day with meals  cycloSPORINE  (SandIMMUNE)  Solution 600 milliGRAM(s) Oral every 12 hours  famotidine    Tablet 20 milliGRAM(s) Oral two times a day  magnesium sulfate  IVPB 2 Gram(s) IV Intermittent once  sodium chloride 0.9%. 1000 milliLiter(s) IV Continuous <Continuous>  sucralfate suspension 1 Gram(s) Oral every 6 hours      PRN MEDICATIONS  acetaminophen   Tablet .. 650 milliGRAM(s) Oral every 6 hours PRN  aluminum hydroxide/magnesium hydroxide/simethicone Suspension 30 milliLiter(s) Oral every 4 hours PRN  EPINEPHrine     1 mG/mL Injectable 0.3 milliGRAM(s) IntraMuscular once PRN  methylPREDNISolone sodium succinate IVPB 500 milliGRAM(s) IV Intermittent once PRN  ondansetron Injectable 4 milliGRAM(s) IV Push every 6 hours PRN        Vital Signs Last 24 Hrs  T(C): 36.7 (08 May 2020 04:55), Max: 36.7 (08 May 2020 04:55)  T(F): 98 (08 May 2020 04:55), Max: 98 (08 May 2020 04:55)  HR: 61 (08 May 2020 04:55) (61 - 75)  BP: 136/82 (08 May 2020 04:55) (131/81 - 140/87)  BP(mean): --  RR: 18 (08 May 2020 04:55) (17 - 18)  SpO2: 99% (08 May 2020 04:55) (96% - 99%)    PHYSICAL EXAM  General: NAD  HEENT:  clear oropharynx, +icteric sclera  CV: (+) S1/S2 RRR  Lungs: clear to auscultation, no wheezes or rales  Abdomen: soft, non-tender, non-distended (+) BS x 4Q  Ext: no edema  Skin: no rash  Neuro: alert and oriented X 3  Central Line: PIVL CDI           LABS:                        7.4    1.65  )-----------( 43       ( 08 May 2020 05:05 )             21.1         Mean Cell Volume : 84.1 fl  Mean Cell Hemoglobin : 29.5 pg  Mean Cell Hemoglobin Concentration : 35.1 gm/dL  Auto Neutrophil # : 0.12 K/uL  Auto Lymphocyte # : 1.40 K/uL  Auto Monocyte # : 0.10 K/uL  Auto Eosinophil # : 0.00 K/uL  Auto Basophil # : 0.00 K/uL  Auto Neutrophil % : 7.0 %  Auto Lymphocyte % : 85.0 %  Auto Monocyte % : 6.0 %  Auto Eosinophil % : 0.0 %  Auto Basophil % : 0.0 %      05-08    137  |  100  |  21  ----------------------------<  96  4.3   |  26  |  0.84    Ca    8.8      08 May 2020 05:05  Phos  5.8     05-08  Mg     1.5     05-08    TPro  5.9<L>  /  Alb  3.2<L>  /  TBili  2.2<H>  /  DBili  x   /  AST  49<H>  /  ALT  296<H>  /  AlkPhos  92  05-08        PT/INR - ( 07 May 2020 08:35 )   PT: 10.5 sec;   INR: 0.93 ratio         PTT - ( 07 May 2020 08:35 )  PTT:23.9 sec      Uric Acid 3.1  Cyclosporine Level: 283: CYCLOSPORINE: Assay performed by Chemiluminescent Microparticle  Immunoassay (CMIA) on the [employer] system.  All immunoassay tests  are subject to rare interferences from heterophile antibodies so that if  atypical or unexpected results are obtained the lab should be notified to  confirm this result by an alternative method before adjusting medication  dosage. The therapeutic range of 150-400 ng/mL is based on trough levels  of Cyclosporine but levels for clinical management will vary depending on  the organ transplanted, post-dose time of draw, length of time  post-transplant and the individual patient's metabolism. ng/mL (05.08.20 @ 08:08)            RADIOLOGY & ADDITIONAL STUDIES:    from: US Abdomen Upper Quadrant Right (05.04.20 @ 09:45)   IMPRESSION:   Normal right upper quadrant abdominal ultrasound. Diagnosis: Aplastic anemia    Protocol/Chemo Regimen: ATG/Cyclosporine/Prednisone (Previously received Solumedrol Days 1-4) Promacta on hold as of today for Grade 3 transaminitis    Day: 16    Pt endorsed: right abdomen pain yesterday    Review of Systems: Patient denies headache, dizziness, chest pain, palpitations, SOB, nausea, vomiting, diarrhea or dysuria.    Pain scale: denies now     Diet: Regular, ensure enlive qd, no concentrated phos     Allergies    No Known Allergies    Intolerances        ANTIMICROBIALS  acyclovir   Oral Tab/Cap 400 milliGRAM(s) Oral every 8 hours  atovaquone Suspension 750 milliGRAM(s) Oral every 12 hours  caspofungin IVPB      caspofungin IVPB 50 milliGRAM(s) IV Intermittent every 24 hours  levoFLOXacin  Tablet 500 milliGRAM(s) Oral every 24 hours      HEME/ONC MEDICATIONS      STANDING MEDICATIONS  antithymocyte globulin equine Skin Test 0.1 milliLiter(s) IntraDermal once  Biotene Dry Mouth Oral Rinse 5 milliLiter(s) Swish and Spit five times a day  calcium acetate 1334 milliGRAM(s) Oral four times a day with meals  calcium acetate 667 milliGRAM(s) Oral four times a day with meals  cycloSPORINE  (SandIMMUNE)  Solution 600 milliGRAM(s) Oral every 12 hours  famotidine    Tablet 20 milliGRAM(s) Oral two times a day  magnesium sulfate  IVPB 2 Gram(s) IV Intermittent once  sodium chloride 0.9%. 1000 milliLiter(s) IV Continuous <Continuous>  sucralfate suspension 1 Gram(s) Oral every 6 hours      PRN MEDICATIONS  acetaminophen   Tablet .. 650 milliGRAM(s) Oral every 6 hours PRN  aluminum hydroxide/magnesium hydroxide/simethicone Suspension 30 milliLiter(s) Oral every 4 hours PRN  EPINEPHrine     1 mG/mL Injectable 0.3 milliGRAM(s) IntraMuscular once PRN  methylPREDNISolone sodium succinate IVPB 500 milliGRAM(s) IV Intermittent once PRN  ondansetron Injectable 4 milliGRAM(s) IV Push every 6 hours PRN        Vital Signs Last 24 Hrs  T(C): 36.7 (08 May 2020 04:55), Max: 36.7 (08 May 2020 04:55)  T(F): 98 (08 May 2020 04:55), Max: 98 (08 May 2020 04:55)  HR: 61 (08 May 2020 04:55) (61 - 75)  BP: 136/82 (08 May 2020 04:55) (131/81 - 140/87)  BP(mean): --  RR: 18 (08 May 2020 04:55) (17 - 18)  SpO2: 99% (08 May 2020 04:55) (96% - 99%)    PHYSICAL EXAM  General: NAD  HEENT:  clear oropharynx, +icteric sclera  CV: (+) S1/S2 RRR  Lungs: clear to auscultation, no wheezes or rales  Abdomen: soft, non-tender, non-distended (+) BS x 4Q  Ext: no edema  Skin: no rash  Neuro: alert and oriented X 3  Central Line: PIVL CDI           LABS:                        7.4    1.65  )-----------( 43       ( 08 May 2020 05:05 )             21.1         Mean Cell Volume : 84.1 fl  Mean Cell Hemoglobin : 29.5 pg  Mean Cell Hemoglobin Concentration : 35.1 gm/dL  Auto Neutrophil # : 0.12 K/uL  Auto Lymphocyte # : 1.40 K/uL  Auto Monocyte # : 0.10 K/uL  Auto Eosinophil # : 0.00 K/uL  Auto Basophil # : 0.00 K/uL  Auto Neutrophil % : 7.0 %  Auto Lymphocyte % : 85.0 %  Auto Monocyte % : 6.0 %  Auto Eosinophil % : 0.0 %  Auto Basophil % : 0.0 %      05-08    137  |  100  |  21  ----------------------------<  96  4.3   |  26  |  0.84    Ca    8.8      08 May 2020 05:05  Phos  5.8     05-08  Mg     1.5     05-08    TPro  5.9<L>  /  Alb  3.2<L>  /  TBili  2.2<H>  /  DBili  x   /  AST  49<H>  /  ALT  296<H>  /  AlkPhos  92  05-08        PT/INR - ( 07 May 2020 08:35 )   PT: 10.5 sec;   INR: 0.93 ratio         PTT - ( 07 May 2020 08:35 )  PTT:23.9 sec      Uric Acid 3.1  Cyclosporine Level: 283: CYCLOSPORINE: Assay performed by Chemiluminescent Microparticle  Immunoassay (CMIA) on the [employer] system.  All immunoassay tests  are subject to rare interferences from heterophile antibodies so that if  atypical or unexpected results are obtained the lab should be notified to  confirm this result by an alternative method before adjusting medication  dosage. The therapeutic range of 150-400 ng/mL is based on trough levels  of Cyclosporine but levels for clinical management will vary depending on  the organ transplanted, post-dose time of draw, length of time  post-transplant and the individual patient's metabolism. ng/mL (05.08.20 @ 08:08)            RADIOLOGY & ADDITIONAL STUDIES:    from: US Abdomen Upper Quadrant Right (05.04.20 @ 09:45)   IMPRESSION:   Normal right upper quadrant abdominal ultrasound.

## 2020-05-09 LAB
ALBUMIN SERPL ELPH-MCNC: 3.3 G/DL — SIGNIFICANT CHANGE UP (ref 3.3–5)
ALP SERPL-CCNC: 95 U/L — SIGNIFICANT CHANGE UP (ref 40–120)
ALT FLD-CCNC: 257 U/L — HIGH (ref 10–45)
ANION GAP SERPL CALC-SCNC: 14 MMOL/L — SIGNIFICANT CHANGE UP (ref 5–17)
AST SERPL-CCNC: 48 U/L — HIGH (ref 10–40)
BASOPHILS # BLD AUTO: 0 K/UL — SIGNIFICANT CHANGE UP (ref 0–0.2)
BASOPHILS NFR BLD AUTO: 0 % — SIGNIFICANT CHANGE UP (ref 0–2)
BILIRUB DIRECT SERPL-MCNC: 0.8 MG/DL — HIGH (ref 0–0.2)
BILIRUB SERPL-MCNC: 2.1 MG/DL — HIGH (ref 0.2–1.2)
BUN SERPL-MCNC: 20 MG/DL — SIGNIFICANT CHANGE UP (ref 7–23)
CALCIUM SERPL-MCNC: 8.9 MG/DL — SIGNIFICANT CHANGE UP (ref 8.4–10.5)
CHLORIDE SERPL-SCNC: 100 MMOL/L — SIGNIFICANT CHANGE UP (ref 96–108)
CO2 SERPL-SCNC: 25 MMOL/L — SIGNIFICANT CHANGE UP (ref 22–31)
CREAT SERPL-MCNC: 0.88 MG/DL — SIGNIFICANT CHANGE UP (ref 0.5–1.3)
EOSINOPHIL # BLD AUTO: 0 K/UL — SIGNIFICANT CHANGE UP (ref 0–0.5)
EOSINOPHIL NFR BLD AUTO: 0 % — SIGNIFICANT CHANGE UP (ref 0–6)
GLUCOSE SERPL-MCNC: 93 MG/DL — SIGNIFICANT CHANGE UP (ref 70–99)
HCT VFR BLD CALC: 21.8 % — LOW (ref 39–50)
HGB BLD-MCNC: 7.8 G/DL — LOW (ref 13–17)
LDH SERPL L TO P-CCNC: 155 U/L — SIGNIFICANT CHANGE UP (ref 50–242)
LYMPHOCYTES # BLD AUTO: 1.72 K/UL — SIGNIFICANT CHANGE UP (ref 1–3.3)
LYMPHOCYTES # BLD AUTO: 86.2 % — HIGH (ref 13–44)
MAGNESIUM SERPL-MCNC: 1.7 MG/DL — SIGNIFICANT CHANGE UP (ref 1.6–2.6)
MCHC RBC-ENTMCNC: 30.4 PG — SIGNIFICANT CHANGE UP (ref 27–34)
MCHC RBC-ENTMCNC: 35.8 GM/DL — SIGNIFICANT CHANGE UP (ref 32–36)
MCV RBC AUTO: 84.8 FL — SIGNIFICANT CHANGE UP (ref 80–100)
MONOCYTES # BLD AUTO: 0 K/UL — SIGNIFICANT CHANGE UP (ref 0–0.9)
MONOCYTES NFR BLD AUTO: 0 % — LOW (ref 2–14)
NEUTROPHILS # BLD AUTO: 0.26 K/UL — LOW (ref 1.8–7.4)
NEUTROPHILS NFR BLD AUTO: 12.9 % — LOW (ref 43–77)
PHOSPHATE SERPL-MCNC: 5.4 MG/DL — HIGH (ref 2.5–4.5)
PLATELET # BLD AUTO: 63 K/UL — LOW (ref 150–400)
POTASSIUM SERPL-MCNC: 4 MMOL/L — SIGNIFICANT CHANGE UP (ref 3.5–5.3)
POTASSIUM SERPL-SCNC: 4 MMOL/L — SIGNIFICANT CHANGE UP (ref 3.5–5.3)
PROT SERPL-MCNC: 6 G/DL — SIGNIFICANT CHANGE UP (ref 6–8.3)
RBC # BLD: 2.57 M/UL — LOW (ref 4.2–5.8)
RBC # FLD: 13 % — SIGNIFICANT CHANGE UP (ref 10.3–14.5)
SODIUM SERPL-SCNC: 139 MMOL/L — SIGNIFICANT CHANGE UP (ref 135–145)
URATE SERPL-MCNC: 3.4 MG/DL — SIGNIFICANT CHANGE UP (ref 3.4–8.8)
WBC # BLD: 2 K/UL — LOW (ref 3.8–10.5)
WBC # FLD AUTO: 2 K/UL — LOW (ref 3.8–10.5)

## 2020-05-09 PROCEDURE — 99232 SBSQ HOSP IP/OBS MODERATE 35: CPT

## 2020-05-09 RX ORDER — CALCIUM ACETATE 667 MG
667 TABLET ORAL
Refills: 0 | Status: COMPLETED | OUTPATIENT
Start: 2020-05-09 | End: 2020-05-09

## 2020-05-09 RX ADMIN — Medication 1 GRAM(S): at 05:49

## 2020-05-09 RX ADMIN — CASPOFUNGIN ACETATE 260 MILLIGRAM(S): 7 INJECTION, POWDER, LYOPHILIZED, FOR SOLUTION INTRAVENOUS at 08:42

## 2020-05-09 RX ADMIN — ATOVAQUONE 750 MILLIGRAM(S): 750 SUSPENSION ORAL at 17:08

## 2020-05-09 RX ADMIN — Medication 400 MILLIGRAM(S): at 22:22

## 2020-05-09 RX ADMIN — FAMOTIDINE 20 MILLIGRAM(S): 10 INJECTION INTRAVENOUS at 17:09

## 2020-05-09 RX ADMIN — Medication 5 MILLILITER(S): at 11:46

## 2020-05-09 RX ADMIN — Medication 667 MILLIGRAM(S): at 10:09

## 2020-05-09 RX ADMIN — ATOVAQUONE 750 MILLIGRAM(S): 750 SUSPENSION ORAL at 05:47

## 2020-05-09 RX ADMIN — Medication 667 MILLIGRAM(S): at 17:09

## 2020-05-09 RX ADMIN — CYCLOSPORINE 600 MILLIGRAM(S): 100 CAPSULE ORAL at 05:47

## 2020-05-09 RX ADMIN — Medication 400 MILLIGRAM(S): at 05:46

## 2020-05-09 RX ADMIN — FAMOTIDINE 20 MILLIGRAM(S): 10 INJECTION INTRAVENOUS at 05:49

## 2020-05-09 RX ADMIN — CYCLOSPORINE 600 MILLIGRAM(S): 100 CAPSULE ORAL at 17:08

## 2020-05-09 RX ADMIN — Medication 40 MILLIGRAM(S): at 05:49

## 2020-05-09 RX ADMIN — Medication 1 GRAM(S): at 22:23

## 2020-05-09 RX ADMIN — Medication 667 MILLIGRAM(S): at 22:23

## 2020-05-09 RX ADMIN — Medication 1 GRAM(S): at 11:50

## 2020-05-09 RX ADMIN — Medication 5 MILLILITER(S): at 15:09

## 2020-05-09 RX ADMIN — Medication 5 MILLILITER(S): at 20:42

## 2020-05-09 RX ADMIN — Medication 667 MILLIGRAM(S): at 13:48

## 2020-05-09 RX ADMIN — Medication 1 GRAM(S): at 17:09

## 2020-05-09 RX ADMIN — Medication 400 MILLIGRAM(S): at 13:47

## 2020-05-09 NOTE — PROGRESS NOTE ADULT - ATTENDING COMMENTS
35yo M with no PMH presented to OSH with dizziness x 2 weeks, easy bruising x 2-4 weeks. At home had a syncopal event and called 911, found to have hemoglobin 3s, Platelets 2K, no evidence of DIC. Noted mild petechiae and has had gum bleeding. These have improved. Noted dark stools for 2 weeks.   -Bone marrow evaluation (4/20) c/w severe aplastic anemia. Treatment plan: hATG, Cyclosporine, Eltrombopag.   - ATG test dose negative. Begin ATG, CSA, Eltrombopag on 4/23, today is day 16  -patient developed hives and short episode of wheezing with ATG infusion, which resolved- will continue Solumedrol pre-med with ATG(hold Prednisone), then resume Prednisone on day 5 to complete recommended course for prevention of serum sickness. Now on steroid taper  - IVF, mouth care; discontinued Allopurinol  -receiving transfusions if Hgb< 7, Plt <50k   - s/p vit K for elevated INR  -optho consult given hazy vision, suspect retinal bleed given low platelets. Platelet transfusional support for goal >50k.   -monitor for s/s of bleeding, gum bleeding intermittent   -Paranasal sinusitis on CT head(4/17)- cont Levaquin, begin Posaconazole; on Mepron and Acyclovir with immunosuppressive therapy.  -LFTs increasing, had switched posaconazole to caspofungin on 4/29 and when still uptrending subsequently held promacta on 5/1. Ordered abdominal sono on 5/3 - normal.  LFTs improving  -some abdominal discomfort, likely gastritis type symptoms due to steroid, increased pepcid to BID and added carafate.  -Carafate helped significantly with abdominal symptoms, which are now mostly resolved.   -Cyclosporine level 283 5/8, cont same dose. Will repeat next week 33yo M with no PMH presented to OSH with dizziness x 2 weeks, easy bruising x 2-4 weeks. At home had a syncopal event and called 911, found to have hemoglobin 3s, Platelets 2K, no evidence of DIC. Noted mild petechiae and has had gum bleeding. These have improved. Noted dark stools for 2 weeks.   -Bone marrow evaluation (4/20) c/w severe aplastic anemia. Treatment plan: hATG, Cyclosporine, Eltrombopag.   - ATG test dose negative. Begin ATG, CSA, Eltrombopag on 4/23, today is day 17  -patient developed hives and short episode of wheezing with ATG infusion, which resolved- will continue Solumedrol pre-med with ATG(hold Prednisone), then resume Prednisone on day 5 to complete recommended course for prevention of serum sickness. Now on steroid taper  - IVF, mouth care; discontinued Allopurinol  -receiving transfusions if Hgb< 7, Plt <50k   - s/p vit K for elevated INR  -optho consult given hazy vision, suspect retinal bleed given low platelets. Platelet transfusional support for goal >50k.   -monitor for s/s of bleeding, gum bleeding intermittent   -Paranasal sinusitis on CT head(4/17)- cont Levaquin, begin Posaconazole; on Mepron and Acyclovir with immunosuppressive therapy.  -LFTs increasing, had switched posaconazole to caspofungin on 4/29 and when still uptrending subsequently held promacta on 5/1. Ordered abdominal sono on 5/3 - normal.  LFTs improving but currently still grade 3. Consider restarting when < grade 2  -some abdominal discomfort, likely gastritis type symptoms due to steroid, increased pepcid to BID and added carafate.  -Carafate helped significantly with abdominal symptoms, which are now mostly resolved.   -Cyclosporine level 283 5/8, cont same dose. Will repeat next week

## 2020-05-09 NOTE — PROGRESS NOTE ADULT - PROBLEM SELECTOR PLAN 3
Grade 3 transaminitis with hyperbilirubinemia, improving slowly  Posaconazole changed to caspofungin  Promacta on hold as of 5/1. Will re-evaluate when to restart  s/p US Abdomen 5/4- unremarkable Grade 3 transaminitis with hyperbilirubinemia, improving   Posaconazole changed to caspofungin  5/1 Promacta placed on hold. Once transaminitis is grade 2 can consider restarting  5/4 US Abdomen unremarkable Grade 3 transaminitis with hyperbilirubinemia, improving   Posaconazole changed to caspofungin  5/1 Promacta placed on hold. Once transaminitis is Grade 2 or less can consider restarting  5/4 US Abdomen unremarkable

## 2020-05-09 NOTE — PROGRESS NOTE ADULT - SUBJECTIVE AND OBJECTIVE BOX
Diagnosis:    Protocol/Chemo Regimen:    Day:     Pt endorsed:    Review of Systems:     Pain scale:     Diet:     Allergies    No Known Allergies    Intolerances        ANTIMICROBIALS  acyclovir   Oral Tab/Cap 400 milliGRAM(s) Oral every 8 hours  atovaquone Suspension 750 milliGRAM(s) Oral every 12 hours  caspofungin IVPB      caspofungin IVPB 50 milliGRAM(s) IV Intermittent every 24 hours  levoFLOXacin  Tablet 500 milliGRAM(s) Oral every 24 hours      HEME/ONC MEDICATIONS      STANDING MEDICATIONS  antithymocyte globulin equine Skin Test 0.1 milliLiter(s) IntraDermal once  Biotene Dry Mouth Oral Rinse 5 milliLiter(s) Swish and Spit five times a day  cycloSPORINE  (SandIMMUNE)  Solution 600 milliGRAM(s) Oral every 12 hours  famotidine    Tablet 20 milliGRAM(s) Oral two times a day  predniSONE   Tablet 40 milliGRAM(s) Oral daily  sodium chloride 0.9%. 1000 milliLiter(s) IV Continuous <Continuous>  sucralfate suspension 1 Gram(s) Oral every 6 hours      PRN MEDICATIONS  acetaminophen   Tablet .. 650 milliGRAM(s) Oral every 6 hours PRN  aluminum hydroxide/magnesium hydroxide/simethicone Suspension 30 milliLiter(s) Oral every 4 hours PRN  EPINEPHrine     1 mG/mL Injectable 0.3 milliGRAM(s) IntraMuscular once PRN  methylPREDNISolone sodium succinate IVPB 500 milliGRAM(s) IV Intermittent once PRN  ondansetron Injectable 4 milliGRAM(s) IV Push every 6 hours PRN        Vital Signs Last 24 Hrs  T(C): 36.2 (09 May 2020 05:51), Max: 36.2 (09 May 2020 05:51)  T(F): 97.1 (09 May 2020 05:51), Max: 97.1 (09 May 2020 05:51)  HR: 62 (09 May 2020 05:51) (62 - 96)  BP: 143/85 (09 May 2020 05:51) (129/87 - 158/92)  BP(mean): --  RR: 18 (09 May 2020 05:51) (18 - 18)  SpO2: 98% (09 May 2020 05:51) (97% - 99%)    PHYSICAL EXAM  General: NAD  HEENT: PERRLA, EOMI, clear oropharynx  Neck: supple  CV: (+) S1/S2 RRR  Lungs: clear to auscultation, no wheezes or rales  Abdomen: soft, non-tender, non-distended (+) BS  Ext: no edema  Skin: no rashes   Neuro: alert and oriented X 3, no focal deficits  Central Line:     RECENT CULTURES:        LABS:                        7.8    2.00  )-----------( 63       ( 09 May 2020 06:52 )             21.8         Mean Cell Volume : 84.8 fl  Mean Cell Hemoglobin : 30.4 pg  Mean Cell Hemoglobin Concentration : 35.8 gm/dL  Auto Neutrophil # : 0.26 K/uL  Auto Lymphocyte # : 1.72 K/uL  Auto Monocyte # : 0.00 K/uL  Auto Eosinophil # : 0.00 K/uL  Auto Basophil # : 0.00 K/uL  Auto Neutrophil % : 12.9 %  Auto Lymphocyte % : 86.2 %  Auto Monocyte % : 0.0 %  Auto Eosinophil % : 0.0 %  Auto Basophil % : 0.0 %      05-09    139  |  100  |  20  ----------------------------<  93  4.0   |  25  |  0.88    Ca    8.9      09 May 2020 06:49  Phos  5.4     05-09  Mg     1.7     05-09    TPro  6.0  /  Alb  3.3  /  TBili  2.1<H>  /  DBili  0.8<H>  /  AST  48<H>  /  ALT  257<H>  /  AlkPhos  95  05-09      Mg 1.7  Phos 5.4            Uric Acid 3.4        RADIOLOGY & ADDITIONAL STUDIES: Diagnosis: Aplastic anemia    Protocol/Chemo Regimen: ATG/Cyclosporine/Prednisone (Previously received Solumedrol Days 1-4) Promacta on hold as of today for Grade 3 transaminitis    Day: 17    Pt endorsed: no complaints    Review of Systems: Patient denies headache, dizziness, visual changes, chest pain, palpitations, SOB, abdominal pain, nausea, vomiting, diarrhea or dysuria.    Pain scale: denies     Diet: Regular    Allergies: No Known Allergies      ANTIMICROBIALS  acyclovir   Oral Tab/Cap 400 milliGRAM(s) Oral every 8 hours  atovaquone Suspension 750 milliGRAM(s) Oral every 12 hours    caspofungin IVPB 50 milliGRAM(s) IV Intermittent every 24 hours  levoFLOXacin  Tablet 500 milliGRAM(s) Oral every 24 hours      STANDING MEDICATIONS  antithymocyte globulin equine Skin Test 0.1 milliLiter(s) IntraDermal once  Biotene Dry Mouth Oral Rinse 5 milliLiter(s) Swish and Spit five times a day  cycloSPORINE  (SandIMMUNE)  Solution 600 milliGRAM(s) Oral every 12 hours  famotidine    Tablet 20 milliGRAM(s) Oral two times a day  predniSONE   Tablet 40 milliGRAM(s) Oral daily  sodium chloride 0.9%. 1000 milliLiter(s) IV Continuous <Continuous>  sucralfate suspension 1 Gram(s) Oral every 6 hours      PRN MEDICATIONS  acetaminophen   Tablet .. 650 milliGRAM(s) Oral every 6 hours PRN  aluminum hydroxide/magnesium hydroxide/simethicone Suspension 30 milliLiter(s) Oral every 4 hours PRN  EPINEPHrine     1 mG/mL Injectable 0.3 milliGRAM(s) IntraMuscular once PRN  methylPREDNISolone sodium succinate IVPB 500 milliGRAM(s) IV Intermittent once PRN  ondansetron Injectable 4 milliGRAM(s) IV Push every 6 hours PRN      Vital Signs Last 24 Hrs  T(C): 36.2 (09 May 2020 05:51), Max: 36.2 (09 May 2020 05:51)  T(F): 97.1 (09 May 2020 05:51), Max: 97.1 (09 May 2020 05:51)  HR: 62 (09 May 2020 05:51) (62 - 96)  BP: 143/85 (09 May 2020 05:51) (129/87 - 158/92)  BP(mean): --  RR: 18 (09 May 2020 05:51) (18 - 18)  SpO2: 98% (09 May 2020 05:51) (97% - 99%)      PHYSICAL EXAM  General: NAD  HEENT: (+) icteric sclera  CV: (+) S1/S2 RRR  Lungs: clear to auscultation, no wheezes or rales  Abdomen: soft, non-tender, non-distended (+) BS  Ext: no edema  Skin: no rashes   Neuro: alert and oriented X 3, no focal deficits  PIV: C/D/I       LABS:                        7.8    2.00  )-----------( 63       ( 09 May 2020 06:52 )             21.8         Mean Cell Volume : 84.8 fl  Mean Cell Hemoglobin : 30.4 pg  Mean Cell Hemoglobin Concentration : 35.8 gm/dL  Auto Neutrophil # : 0.26 K/uL  Auto Lymphocyte # : 1.72 K/uL  Auto Monocyte # : 0.00 K/uL  Auto Eosinophil # : 0.00 K/uL  Auto Basophil # : 0.00 K/uL  Auto Neutrophil % : 12.9 %  Auto Lymphocyte % : 86.2 %  Auto Monocyte % : 0.0 %  Auto Eosinophil % : 0.0 %  Auto Basophil % : 0.0 %      05-09    139  |  100  |  20  ----------------------------<  93  4.0   |  25  |  0.88    Ca    8.9      09 May 2020 06:49  Phos  5.4     05-09  Mg     1.7     05-09    TPro  6.0  /  Alb  3.3  /  TBili  2.1<H>  /  DBili  0.8<H>  /  AST  48<H>  /  ALT  257<H>  /  AlkPhos  95  05-09      Mg 1.7  Phos 5.4      Uric Acid 3.4        RADIOLOGY & ADDITIONAL STUDIES:    EXAM:  US ABDOMEN RT UPR QUADRANT                        PROCEDURE DATE:  05/04/2020   IMPRESSION: Normal right upper quadrant abdominal ultrasound.

## 2020-05-09 NOTE — PROGRESS NOTE ADULT - PROBLEM SELECTOR PLAN 1
4/20 BM Bx consistent with Aplastic Anemia  Status post Horse ATG 40mg/kg/day daily x 4 days (4/23- 4/26)  Continue Prednisone (taper to continue through 5/17) and CSA   5/8 CSA level 283. will continue same dose,  increased to 600mg PO q12 on 4/30,  Follow up next level on 5/13/20  Promacta on hold for increasing liver disfunction, still grade 3 ALT  Monitor CBC/Lytes and transfuse/replete PRN  Keep PLT> 50k for retinal hemorrhage  platelet x 1 unit   Hyperphosphatemia: Phoslo 1 tab x 2 doses   Hypomagnesemia - magnesium sulphate 2 g IV x 1  Strict Is and Os/Daily weights/Mouth Care  IVF  Awaiting approval for outpatient medications cyclosporine and promacta, letter of medical necessity submitted 4/20 BM Bx consistent with Aplastic Anemia  Status post Horse ATG 40mg/kg/day daily x 4 days (4/23- 4/26)  Continue Prednisone (taper to continue through 5/17) and CSA   5/8 CSA level 288 continue same dose and follow up next level on 5/13  Promacta on hold for increasing liver disfunction  Monitor CBC/Lytes and transfuse/replete PRN  Keep PLT> 50k for retinal hemorrhage  Hyperphosphatemia: Phoslo 4 tabs today  Strict Is and Os/Daily weights/Mouth Care  IVF

## 2020-05-10 LAB
ALBUMIN SERPL ELPH-MCNC: 3.3 G/DL — SIGNIFICANT CHANGE UP (ref 3.3–5)
ALP SERPL-CCNC: 90 U/L — SIGNIFICANT CHANGE UP (ref 40–120)
ALT FLD-CCNC: 231 U/L — HIGH (ref 10–45)
ANION GAP SERPL CALC-SCNC: 14 MMOL/L — SIGNIFICANT CHANGE UP (ref 5–17)
AST SERPL-CCNC: 42 U/L — HIGH (ref 10–40)
BASOPHILS # BLD AUTO: 0 K/UL — SIGNIFICANT CHANGE UP (ref 0–0.2)
BASOPHILS NFR BLD AUTO: 0 % — SIGNIFICANT CHANGE UP (ref 0–2)
BILIRUB DIRECT SERPL-MCNC: 0.8 MG/DL — HIGH (ref 0–0.2)
BILIRUB SERPL-MCNC: 2.2 MG/DL — HIGH (ref 0.2–1.2)
BUN SERPL-MCNC: 20 MG/DL — SIGNIFICANT CHANGE UP (ref 7–23)
CALCIUM SERPL-MCNC: 9.1 MG/DL — SIGNIFICANT CHANGE UP (ref 8.4–10.5)
CHLORIDE SERPL-SCNC: 101 MMOL/L — SIGNIFICANT CHANGE UP (ref 96–108)
CO2 SERPL-SCNC: 22 MMOL/L — SIGNIFICANT CHANGE UP (ref 22–31)
CREAT SERPL-MCNC: 0.87 MG/DL — SIGNIFICANT CHANGE UP (ref 0.5–1.3)
EOSINOPHIL # BLD AUTO: 0 K/UL — SIGNIFICANT CHANGE UP (ref 0–0.5)
EOSINOPHIL NFR BLD AUTO: 0 % — SIGNIFICANT CHANGE UP (ref 0–6)
GLUCOSE SERPL-MCNC: 91 MG/DL — SIGNIFICANT CHANGE UP (ref 70–99)
HCT VFR BLD CALC: 22.4 % — LOW (ref 39–50)
HGB BLD-MCNC: 8 G/DL — LOW (ref 13–17)
LDH SERPL L TO P-CCNC: 170 U/L — SIGNIFICANT CHANGE UP (ref 50–242)
LYMPHOCYTES # BLD AUTO: 1.73 K/UL — SIGNIFICANT CHANGE UP (ref 1–3.3)
LYMPHOCYTES # BLD AUTO: 88 % — HIGH (ref 13–44)
MAGNESIUM SERPL-MCNC: 1.6 MG/DL — SIGNIFICANT CHANGE UP (ref 1.6–2.6)
MCHC RBC-ENTMCNC: 30.7 PG — SIGNIFICANT CHANGE UP (ref 27–34)
MCHC RBC-ENTMCNC: 35.7 GM/DL — SIGNIFICANT CHANGE UP (ref 32–36)
MCV RBC AUTO: 85.8 FL — SIGNIFICANT CHANGE UP (ref 80–100)
MONOCYTES # BLD AUTO: 0.08 K/UL — SIGNIFICANT CHANGE UP (ref 0–0.9)
MONOCYTES NFR BLD AUTO: 4 % — SIGNIFICANT CHANGE UP (ref 2–14)
NEUTROPHILS # BLD AUTO: 0.16 K/UL — LOW (ref 1.8–7.4)
NEUTROPHILS NFR BLD AUTO: 8 % — LOW (ref 43–77)
PHOSPHATE SERPL-MCNC: 5.1 MG/DL — HIGH (ref 2.5–4.5)
PLATELET # BLD AUTO: 51 K/UL — LOW (ref 150–400)
POTASSIUM SERPL-MCNC: 4.1 MMOL/L — SIGNIFICANT CHANGE UP (ref 3.5–5.3)
POTASSIUM SERPL-SCNC: 4.1 MMOL/L — SIGNIFICANT CHANGE UP (ref 3.5–5.3)
PROT SERPL-MCNC: 6 G/DL — SIGNIFICANT CHANGE UP (ref 6–8.3)
RBC # BLD: 2.61 M/UL — LOW (ref 4.2–5.8)
RBC # FLD: 13 % — SIGNIFICANT CHANGE UP (ref 10.3–14.5)
SODIUM SERPL-SCNC: 137 MMOL/L — SIGNIFICANT CHANGE UP (ref 135–145)
URATE SERPL-MCNC: 3.6 MG/DL — SIGNIFICANT CHANGE UP (ref 3.4–8.8)
WBC # BLD: 1.97 K/UL — LOW (ref 3.8–10.5)
WBC # FLD AUTO: 1.97 K/UL — LOW (ref 3.8–10.5)

## 2020-05-10 PROCEDURE — 99232 SBSQ HOSP IP/OBS MODERATE 35: CPT

## 2020-05-10 RX ORDER — CALCIUM ACETATE 667 MG
667 TABLET ORAL
Refills: 0 | Status: COMPLETED | OUTPATIENT
Start: 2020-05-10 | End: 2020-05-11

## 2020-05-10 RX ADMIN — ATOVAQUONE 750 MILLIGRAM(S): 750 SUSPENSION ORAL at 05:44

## 2020-05-10 RX ADMIN — Medication 1 GRAM(S): at 23:38

## 2020-05-10 RX ADMIN — Medication 5 MILLILITER(S): at 11:39

## 2020-05-10 RX ADMIN — Medication 400 MILLIGRAM(S): at 05:44

## 2020-05-10 RX ADMIN — Medication 400 MILLIGRAM(S): at 13:24

## 2020-05-10 RX ADMIN — CYCLOSPORINE 600 MILLIGRAM(S): 100 CAPSULE ORAL at 17:10

## 2020-05-10 RX ADMIN — FAMOTIDINE 20 MILLIGRAM(S): 10 INJECTION INTRAVENOUS at 05:45

## 2020-05-10 RX ADMIN — Medication 667 MILLIGRAM(S): at 17:10

## 2020-05-10 RX ADMIN — CASPOFUNGIN ACETATE 260 MILLIGRAM(S): 7 INJECTION, POWDER, LYOPHILIZED, FOR SOLUTION INTRAVENOUS at 07:37

## 2020-05-10 RX ADMIN — Medication 1 GRAM(S): at 05:45

## 2020-05-10 RX ADMIN — Medication 667 MILLIGRAM(S): at 22:32

## 2020-05-10 RX ADMIN — Medication 5 MILLILITER(S): at 17:10

## 2020-05-10 RX ADMIN — Medication 667 MILLIGRAM(S): at 11:37

## 2020-05-10 RX ADMIN — CYCLOSPORINE 600 MILLIGRAM(S): 100 CAPSULE ORAL at 05:45

## 2020-05-10 RX ADMIN — Medication 1 GRAM(S): at 17:10

## 2020-05-10 RX ADMIN — SODIUM CHLORIDE 75 MILLILITER(S): 9 INJECTION INTRAMUSCULAR; INTRAVENOUS; SUBCUTANEOUS at 05:44

## 2020-05-10 RX ADMIN — Medication 1 GRAM(S): at 11:37

## 2020-05-10 RX ADMIN — Medication 5 MILLILITER(S): at 23:38

## 2020-05-10 RX ADMIN — Medication 30 MILLILITER(S): at 22:32

## 2020-05-10 RX ADMIN — ATOVAQUONE 750 MILLIGRAM(S): 750 SUSPENSION ORAL at 17:10

## 2020-05-10 RX ADMIN — Medication 40 MILLIGRAM(S): at 05:45

## 2020-05-10 RX ADMIN — Medication 667 MILLIGRAM(S): at 08:10

## 2020-05-10 RX ADMIN — FAMOTIDINE 20 MILLIGRAM(S): 10 INJECTION INTRAVENOUS at 17:10

## 2020-05-10 RX ADMIN — Medication 5 MILLILITER(S): at 01:11

## 2020-05-10 RX ADMIN — Medication 400 MILLIGRAM(S): at 22:29

## 2020-05-10 RX ADMIN — Medication 5 MILLILITER(S): at 20:14

## 2020-05-10 NOTE — PROGRESS NOTE ADULT - PROBLEM SELECTOR PLAN 3
Grade 3 transaminitis with hyperbilirubinemia, improving   Posaconazole changed to caspofungin  5/1 Promacta placed on hold. Once transaminitis is Grade 2 or less can consider restarting  5/4 US Abdomen unremarkable

## 2020-05-10 NOTE — PROGRESS NOTE ADULT - PROBLEM SELECTOR PLAN 1
4/20 BM Bx consistent with Aplastic Anemia  Status post Horse ATG 40mg/kg/day daily x 4 days (4/23- 4/26)  Continue Prednisone (taper to continue through 5/17) and CSA   5/8 CSA level 288 continue same dose and follow up next level on 5/13  Promacta on hold for increasing liver disfunction  Monitor CBC/Lytes and transfuse/replete PRN  Keep PLT> 50k for retinal hemorrhage  Hyperphosphatemia: Phoslo 4 tabs today  Strict Is and Os/Daily weights/Mouth Care  IVF 4/20 BM Bx consistent with Aplastic Anemia  Status post Horse ATG 40mg/kg/day daily x 4 days (4/23- 4/26)  Continue Prednisone (taper to continue through 5/17) and CSA   5/8 CSA level 288 continue same dose and follow up next level on 5/13  Promacta on hold for increasing liver disfunction  Monitor CBC/Lytes and transfuse/replete PRN  Keep PLT> 50k for retinal hemorrhage  Hyperphosphatemia: Continue Phoslo.  Strict Is and Os/Daily weights/Mouth Care  IVF

## 2020-05-10 NOTE — PROGRESS NOTE ADULT - SUBJECTIVE AND OBJECTIVE BOX
Diagnosis:    Protocol/Chemo Regimen:    Day:     Pt endorsed:    Review of Systems:     Pain scale:     Diet:     Allergies    No Known Allergies    Intolerances        ANTIMICROBIALS  acyclovir   Oral Tab/Cap 400 milliGRAM(s) Oral every 8 hours  atovaquone Suspension 750 milliGRAM(s) Oral every 12 hours  caspofungin IVPB      caspofungin IVPB 50 milliGRAM(s) IV Intermittent every 24 hours  levoFLOXacin  Tablet 500 milliGRAM(s) Oral every 24 hours      HEME/ONC MEDICATIONS      STANDING MEDICATIONS  antithymocyte globulin equine Skin Test 0.1 milliLiter(s) IntraDermal once  Biotene Dry Mouth Oral Rinse 5 milliLiter(s) Swish and Spit five times a day  calcium acetate 667 milliGRAM(s) Oral four times a day with meals  cycloSPORINE  (SandIMMUNE)  Solution 600 milliGRAM(s) Oral every 12 hours  famotidine    Tablet 20 milliGRAM(s) Oral two times a day  predniSONE   Tablet 40 milliGRAM(s) Oral daily  sodium chloride 0.9%. 1000 milliLiter(s) IV Continuous <Continuous>  sucralfate suspension 1 Gram(s) Oral every 6 hours      PRN MEDICATIONS  acetaminophen   Tablet .. 650 milliGRAM(s) Oral every 6 hours PRN  aluminum hydroxide/magnesium hydroxide/simethicone Suspension 30 milliLiter(s) Oral every 4 hours PRN  EPINEPHrine     1 mG/mL Injectable 0.3 milliGRAM(s) IntraMuscular once PRN  methylPREDNISolone sodium succinate IVPB 500 milliGRAM(s) IV Intermittent once PRN  ondansetron Injectable 4 milliGRAM(s) IV Push every 6 hours PRN        Vital Signs Last 24 Hrs  T(C): 36.8 (10 May 2020 05:20), Max: 37 (09 May 2020 21:34)  T(F): 98.3 (10 May 2020 05:20), Max: 98.6 (09 May 2020 21:34)  HR: 65 (10 May 2020 05:20) (65 - 94)  BP: 152/91 (10 May 2020 05:20) (130/80 - 152/91)  BP(mean): --  RR: 18 (10 May 2020 05:20) (18 - 18)  SpO2: 99% (10 May 2020 05:20) (98% - 99%)    PHYSICAL EXAM  General: NAD  HEENT: PERRLA, EOMOI, clear oropharynx, anicteric sclera, pink conjunctiva  Neck: supple  CV: (+) S1/S2 RRR  Lungs: clear to auscultation, no wheezes or rales  Abdomen: soft, non-tender, non-distended (+) BS  Ext: no clubbing, cyanosis or edema  Skin: no rashes and no petechiae  Neuro: alert and oriented X 3, no focal deficits  Central Line:     RECENT CULTURES:        LABS:                        8.0    1.97  )-----------( 51       ( 10 May 2020 07:06 )             22.4         Mean Cell Volume : 85.8 fl  Mean Cell Hemoglobin : 30.7 pg  Mean Cell Hemoglobin Concentration : 35.7 gm/dL  Auto Neutrophil # : 0.16 K/uL  Auto Lymphocyte # : 1.73 K/uL  Auto Monocyte # : 0.08 K/uL  Auto Eosinophil # : 0.00 K/uL  Auto Basophil # : 0.00 K/uL  Auto Neutrophil % : 8.0 %  Auto Lymphocyte % : 88.0 %  Auto Monocyte % : 4.0 %  Auto Eosinophil % : 0.0 %  Auto Basophil % : 0.0 %      05-10    137  |  101  |  20  ----------------------------<  91  4.1   |  22  |  0.87    Ca    9.1      10 May 2020 07:06  Phos  5.1     05-10  Mg     1.6     05-10    TPro  6.0  /  Alb  3.3  /  TBili  2.2<H>  /  DBili  0.8<H>  /  AST  42<H>  /  ALT  231<H>  /  AlkPhos  90  05-10      Mg 1.6  Phos 5.1            Uric Acid 3.6        RADIOLOGY & ADDITIONAL STUDIES: Diagnosis: Aplastic anemia    Protocol/Chemo Regimen: ATG/Cyclosporine/Prednisone (Previously received Solumedrol Days 1-4) Promacta on hold as of today for Grade 3 transaminitis    Day: 18    Pt endorsed: no complaints    Review of Systems: Patient denies headache, dizziness, visual changes, chest pain, palpitations, SOB, abdominal pain, nausea, vomiting, diarrhea or dysuria.    Pain scale: denies     Diet: Regular    Allergies: No Known Allergies    ANTIMICROBIALS  acyclovir   Oral Tab/Cap 400 milliGRAM(s) Oral every 8 hours  atovaquone Suspension 750 milliGRAM(s) Oral every 12 hours  caspofungin IVPB      caspofungin IVPB 50 milliGRAM(s) IV Intermittent every 24 hours  levoFLOXacin  Tablet 500 milliGRAM(s) Oral every 24 hours    STANDING MEDICATIONS  antithymocyte globulin equine Skin Test 0.1 milliLiter(s) IntraDermal once  Biotene Dry Mouth Oral Rinse 5 milliLiter(s) Swish and Spit five times a day  calcium acetate 667 milliGRAM(s) Oral four times a day with meals  cycloSPORINE  (SandIMMUNE)  Solution 600 milliGRAM(s) Oral every 12 hours  famotidine    Tablet 20 milliGRAM(s) Oral two times a day  predniSONE   Tablet 40 milliGRAM(s) Oral daily  sodium chloride 0.9%. 1000 milliLiter(s) IV Continuous <Continuous>  sucralfate suspension 1 Gram(s) Oral every 6 hours    PRN MEDICATIONS  acetaminophen   Tablet .. 650 milliGRAM(s) Oral every 6 hours PRN  aluminum hydroxide/magnesium hydroxide/simethicone Suspension 30 milliLiter(s) Oral every 4 hours PRN  EPINEPHrine     1 mG/mL Injectable 0.3 milliGRAM(s) IntraMuscular once PRN  methylPREDNISolone sodium succinate IVPB 500 milliGRAM(s) IV Intermittent once PRN  ondansetron Injectable 4 milliGRAM(s) IV Push every 6 hours PRN    Vital Signs Last 24 Hrs  T(C): 36.8 (10 May 2020 05:20), Max: 37 (09 May 2020 21:34)  T(F): 98.3 (10 May 2020 05:20), Max: 98.6 (09 May 2020 21:34)  HR: 65 (10 May 2020 05:20) (65 - 94)  BP: 152/91 (10 May 2020 05:20) (130/80 - 152/91)  BP(mean): --  RR: 18 (10 May 2020 05:20) (18 - 18)  SpO2: 99% (10 May 2020 05:20) (98% - 99%)    PHYSICAL EXAM  General: NAD  HEENT: (+) icteric sclera  CV: (+) S1/S2 RRR  Lungs: clear to auscultation, no wheezes or rales  Abdomen: soft, non-tender, non-distended (+) BS  Ext: no edema  Skin: no rashes   Neuro: alert and oriented X 3, no focal deficits  PIV: C/D/I     LABS:                        8.0    1.97  )-----------( 51       ( 10 May 2020 07:06 )             22.4     Mean Cell Volume : 85.8 fl  Mean Cell Hemoglobin : 30.7 pg  Mean Cell Hemoglobin Concentration : 35.7 gm/dL  Auto Neutrophil # : 0.16 K/uL  Auto Lymphocyte # : 1.73 K/uL  Auto Monocyte # : 0.08 K/uL  Auto Eosinophil # : 0.00 K/uL  Auto Basophil # : 0.00 K/uL  Auto Neutrophil % : 8.0 %  Auto Lymphocyte % : 88.0 %  Auto Monocyte % : 4.0 %  Auto Eosinophil % : 0.0 %  Auto Basophil % : 0.0 %      05-10    137  |  101  |  20  ----------------------------<  91  4.1   |  22  |  0.87    Ca    9.1      10 May 2020 07:06  Phos  5.1     05-10  Mg     1.6     05-10    TPro  6.0  /  Alb  3.3  /  TBili  2.2<H>  /  DBili  0.8<H>  /  AST  42<H>  /  ALT  231<H>  /  AlkPhos  90  05-10  Mg 1.6  Phos 5.1    Uric Acid 3.6    RADIOLOGY & ADDITIONAL STUDIES:   US Abdomen Upper Quadrant Right (05.04.20 @ 09:45) >  Normal right upper quadrant abdominal ultrasound.

## 2020-05-10 NOTE — PROGRESS NOTE ADULT - ATTENDING COMMENTS
35yo M with no PMH presented to OSH with dizziness x 2 weeks, easy bruising x 2-4 weeks. At home had a syncopal event and called 911, found to have hemoglobin 3s, Platelets 2K, no evidence of DIC. Noted mild petechiae and has had gum bleeding. These have improved. Noted dark stools for 2 weeks.   -Bone marrow evaluation (4/20) c/w severe aplastic anemia. Treatment plan: hATG, Cyclosporine, Eltrombopag.   - ATG test dose negative. Begin ATG, CSA, Eltrombopag on 4/23, today is day 17  -patient developed hives and short episode of wheezing with ATG infusion, which resolved- will continue Solumedrol pre-med with ATG(hold Prednisone), then resume Prednisone on day 5 to complete recommended course for prevention of serum sickness. Now on steroid taper  - IVF, mouth care; discontinued Allopurinol  -receiving transfusions if Hgb< 7, Plt <50k   - s/p vit K for elevated INR  -optho consult given hazy vision, suspect retinal bleed given low platelets. Platelet transfusional support for goal >50k.   -monitor for s/s of bleeding, gum bleeding intermittent   -Paranasal sinusitis on CT head(4/17)- cont Levaquin, begin Posaconazole; on Mepron and Acyclovir with immunosuppressive therapy.  -LFTs increasing, had switched posaconazole to caspofungin on 4/29 and when still uptrending subsequently held promacta on 5/1. Ordered abdominal sono on 5/3 - normal.  LFTs improving but currently still grade 3. Consider restarting when < grade 2  -some abdominal discomfort, likely gastritis type symptoms due to steroid, increased pepcid to BID and added carafate.  -Carafate helped significantly with abdominal symptoms, which are now mostly resolved.   -Cyclosporine level 283 5/8, cont same dose. Will repeat next week 35yo M with no PMH presented to OSH with dizziness x 2 weeks, easy bruising x 2-4 weeks. At home had a syncopal event and called 911, found to have hemoglobin 3s, Platelets 2K, no evidence of DIC. Noted mild petechiae and has had gum bleeding. These have improved. Noted dark stools for 2 weeks.   -Bone marrow evaluation (4/20) c/w severe aplastic anemia. Treatment plan: hATG, Cyclosporine, Eltrombopag.   - ATG test dose negative. Begin ATG, CSA, Eltrombopag on 4/23, today is day 18  -patient developed hives and short episode of wheezing with ATG infusion, which resolved- will continue Solumedrol pre-med with ATG(hold Prednisone), then resume Prednisone on day 5 to complete recommended course for prevention of serum sickness. Now on steroid taper  - IVF, mouth care; discontinued Allopurinol  -receiving transfusions if Hgb< 7, Plt <50k   - s/p vit K for elevated INR  -optho consult given hazy vision, suspect retinal bleed given low platelets. Platelet transfusional support for goal >50k.   -monitor for s/s of bleeding, gum bleeding intermittent   -Paranasal sinusitis on CT head(4/17)- cont Levaquin, begin Posaconazole; on Mepron and Acyclovir with immunosuppressive therapy.  -LFTs increasing, had switched posaconazole to caspofungin on 4/29 and when still uptrending subsequently held promacta on 5/1. Ordered abdominal sono on 5/3 - normal.  LFTs improving but currently still grade 3. Consider restarting when < grade 2  -some abdominal discomfort, likely gastritis type symptoms due to steroid, increased pepcid to BID and added carafate.  -Carafate helped significantly with abdominal symptoms, which are now mostly resolved.   -Cyclosporine level 283 5/8, cont same dose. Will repeat this week

## 2020-05-11 LAB
ALBUMIN SERPL ELPH-MCNC: 3.3 G/DL — SIGNIFICANT CHANGE UP (ref 3.3–5)
ALP SERPL-CCNC: 84 U/L — SIGNIFICANT CHANGE UP (ref 40–120)
ALT FLD-CCNC: 199 U/L — HIGH (ref 10–45)
ANION GAP SERPL CALC-SCNC: 14 MMOL/L — SIGNIFICANT CHANGE UP (ref 5–17)
APTT BLD: 23.8 SEC — LOW (ref 27.5–36.3)
AST SERPL-CCNC: 46 U/L — HIGH (ref 10–40)
BASOPHILS # BLD AUTO: 0 K/UL — SIGNIFICANT CHANGE UP (ref 0–0.2)
BASOPHILS NFR BLD AUTO: 0 % — SIGNIFICANT CHANGE UP (ref 0–2)
BILIRUB DIRECT SERPL-MCNC: 0.7 MG/DL — HIGH (ref 0–0.2)
BILIRUB SERPL-MCNC: 1.9 MG/DL — HIGH (ref 0.2–1.2)
BLD GP AB SCN SERPL QL: NEGATIVE — SIGNIFICANT CHANGE UP
BUN SERPL-MCNC: 18 MG/DL — SIGNIFICANT CHANGE UP (ref 7–23)
CALCIUM SERPL-MCNC: 9.1 MG/DL — SIGNIFICANT CHANGE UP (ref 8.4–10.5)
CHLORIDE SERPL-SCNC: 102 MMOL/L — SIGNIFICANT CHANGE UP (ref 96–108)
CO2 SERPL-SCNC: 22 MMOL/L — SIGNIFICANT CHANGE UP (ref 22–31)
CREAT SERPL-MCNC: 0.9 MG/DL — SIGNIFICANT CHANGE UP (ref 0.5–1.3)
EOSINOPHIL # BLD AUTO: 0 K/UL — SIGNIFICANT CHANGE UP (ref 0–0.5)
EOSINOPHIL NFR BLD AUTO: 0 % — SIGNIFICANT CHANGE UP (ref 0–6)
FIBRINOGEN PPP-MCNC: 533 MG/DL — HIGH (ref 350–510)
GLUCOSE SERPL-MCNC: 98 MG/DL — SIGNIFICANT CHANGE UP (ref 70–99)
HCT VFR BLD CALC: 20.3 % — CRITICAL LOW (ref 39–50)
HCT VFR BLD CALC: 23.2 % — LOW (ref 39–50)
HGB BLD-MCNC: 6.9 G/DL — CRITICAL LOW (ref 13–17)
HGB BLD-MCNC: 8.1 G/DL — LOW (ref 13–17)
INR BLD: 0.93 RATIO — SIGNIFICANT CHANGE UP (ref 0.88–1.16)
LDH SERPL L TO P-CCNC: 155 U/L — SIGNIFICANT CHANGE UP (ref 50–242)
LYMPHOCYTES # BLD AUTO: 2.04 K/UL — SIGNIFICANT CHANGE UP (ref 1–3.3)
LYMPHOCYTES # BLD AUTO: 91.1 % — HIGH (ref 13–44)
MAGNESIUM SERPL-MCNC: 1.6 MG/DL — SIGNIFICANT CHANGE UP (ref 1.6–2.6)
MCHC RBC-ENTMCNC: 29 PG — SIGNIFICANT CHANGE UP (ref 27–34)
MCHC RBC-ENTMCNC: 29.9 PG — SIGNIFICANT CHANGE UP (ref 27–34)
MCHC RBC-ENTMCNC: 34 GM/DL — SIGNIFICANT CHANGE UP (ref 32–36)
MCHC RBC-ENTMCNC: 34.9 GM/DL — SIGNIFICANT CHANGE UP (ref 32–36)
MCV RBC AUTO: 85.3 FL — SIGNIFICANT CHANGE UP (ref 80–100)
MCV RBC AUTO: 85.6 FL — SIGNIFICANT CHANGE UP (ref 80–100)
MONOCYTES # BLD AUTO: 0.08 K/UL — SIGNIFICANT CHANGE UP (ref 0–0.9)
MONOCYTES NFR BLD AUTO: 3.6 % — SIGNIFICANT CHANGE UP (ref 2–14)
NEUTROPHILS # BLD AUTO: 0.12 K/UL — LOW (ref 1.8–7.4)
NEUTROPHILS NFR BLD AUTO: 5.3 % — LOW (ref 43–77)
NRBC # BLD: 0 /100 WBCS — SIGNIFICANT CHANGE UP (ref 0–0)
NRBC # BLD: 0 /100 WBCS — SIGNIFICANT CHANGE UP (ref 0–0)
PHOSPHATE SERPL-MCNC: 5.2 MG/DL — HIGH (ref 2.5–4.5)
PLATELET # BLD AUTO: 38 K/UL — LOW (ref 150–400)
PLATELET # BLD AUTO: 99 K/UL — LOW (ref 150–400)
PLATELET # BLD AUTO: 99 K/UL — LOW (ref 150–400)
POTASSIUM SERPL-MCNC: 3.9 MMOL/L — SIGNIFICANT CHANGE UP (ref 3.5–5.3)
POTASSIUM SERPL-SCNC: 3.9 MMOL/L — SIGNIFICANT CHANGE UP (ref 3.5–5.3)
PROT SERPL-MCNC: 5.5 G/DL — LOW (ref 6–8.3)
PROTHROM AB SERPL-ACNC: 10.7 SEC — SIGNIFICANT CHANGE UP (ref 10–12.9)
RBC # BLD: 2.38 M/UL — LOW (ref 4.2–5.8)
RBC # BLD: 2.71 M/UL — LOW (ref 4.2–5.8)
RBC # FLD: 13 % — SIGNIFICANT CHANGE UP (ref 10.3–14.5)
RBC # FLD: 13.1 % — SIGNIFICANT CHANGE UP (ref 10.3–14.5)
RH IG SCN BLD-IMP: POSITIVE — SIGNIFICANT CHANGE UP
SODIUM SERPL-SCNC: 138 MMOL/L — SIGNIFICANT CHANGE UP (ref 135–145)
URATE SERPL-MCNC: 3.6 MG/DL — SIGNIFICANT CHANGE UP (ref 3.4–8.8)
WBC # BLD: 1.22 K/UL — LOW (ref 3.8–10.5)
WBC # BLD: 2.24 K/UL — LOW (ref 3.8–10.5)
WBC # FLD AUTO: 1.22 K/UL — LOW (ref 3.8–10.5)
WBC # FLD AUTO: 2.24 K/UL — LOW (ref 3.8–10.5)

## 2020-05-11 PROCEDURE — 86078 PHYS BLOOD BANK SERV REACTJ: CPT

## 2020-05-11 PROCEDURE — 99232 SBSQ HOSP IP/OBS MODERATE 35: CPT | Mod: GC

## 2020-05-11 RX ORDER — DIPHENHYDRAMINE HCL 50 MG
25 CAPSULE ORAL EVERY 12 HOURS
Refills: 0 | Status: DISCONTINUED | OUTPATIENT
Start: 2020-05-11 | End: 2020-05-18

## 2020-05-11 RX ORDER — HYDROCORTISONE 20 MG
50 TABLET ORAL EVERY 12 HOURS
Refills: 0 | Status: DISCONTINUED | OUTPATIENT
Start: 2020-05-11 | End: 2020-05-20

## 2020-05-11 RX ORDER — ACETAMINOPHEN 500 MG
650 TABLET ORAL EVERY 6 HOURS
Refills: 0 | Status: DISCONTINUED | OUTPATIENT
Start: 2020-05-11 | End: 2020-05-20

## 2020-05-11 RX ORDER — DIPHENHYDRAMINE HCL 50 MG
25 CAPSULE ORAL ONCE
Refills: 0 | Status: DISCONTINUED | OUTPATIENT
Start: 2020-05-11 | End: 2020-05-20

## 2020-05-11 RX ORDER — HYDROCORTISONE 20 MG
50 TABLET ORAL ONCE
Refills: 0 | Status: DISCONTINUED | OUTPATIENT
Start: 2020-05-11 | End: 2020-05-20

## 2020-05-11 RX ADMIN — CYCLOSPORINE 600 MILLIGRAM(S): 100 CAPSULE ORAL at 05:43

## 2020-05-11 RX ADMIN — Medication 667 MILLIGRAM(S): at 17:48

## 2020-05-11 RX ADMIN — SODIUM CHLORIDE 75 MILLILITER(S): 9 INJECTION INTRAMUSCULAR; INTRAVENOUS; SUBCUTANEOUS at 08:32

## 2020-05-11 RX ADMIN — CYCLOSPORINE 600 MILLIGRAM(S): 100 CAPSULE ORAL at 17:48

## 2020-05-11 RX ADMIN — Medication 5 MILLILITER(S): at 11:44

## 2020-05-11 RX ADMIN — Medication 5 MILLILITER(S): at 08:00

## 2020-05-11 RX ADMIN — ATOVAQUONE 750 MILLIGRAM(S): 750 SUSPENSION ORAL at 05:43

## 2020-05-11 RX ADMIN — Medication 5 MILLILITER(S): at 21:50

## 2020-05-11 RX ADMIN — ATOVAQUONE 750 MILLIGRAM(S): 750 SUSPENSION ORAL at 17:48

## 2020-05-11 RX ADMIN — CASPOFUNGIN ACETATE 260 MILLIGRAM(S): 7 INJECTION, POWDER, LYOPHILIZED, FOR SOLUTION INTRAVENOUS at 08:32

## 2020-05-11 RX ADMIN — Medication 667 MILLIGRAM(S): at 08:32

## 2020-05-11 RX ADMIN — Medication 400 MILLIGRAM(S): at 05:42

## 2020-05-11 RX ADMIN — Medication 667 MILLIGRAM(S): at 21:50

## 2020-05-11 RX ADMIN — Medication 40 MILLIGRAM(S): at 05:42

## 2020-05-11 RX ADMIN — FAMOTIDINE 20 MILLIGRAM(S): 10 INJECTION INTRAVENOUS at 17:48

## 2020-05-11 RX ADMIN — Medication 400 MILLIGRAM(S): at 21:50

## 2020-05-11 RX ADMIN — Medication 667 MILLIGRAM(S): at 11:44

## 2020-05-11 RX ADMIN — Medication 400 MILLIGRAM(S): at 14:32

## 2020-05-11 RX ADMIN — Medication 1 GRAM(S): at 11:44

## 2020-05-11 RX ADMIN — Medication 5 MILLILITER(S): at 17:48

## 2020-05-11 RX ADMIN — Medication 1 GRAM(S): at 05:43

## 2020-05-11 RX ADMIN — FAMOTIDINE 20 MILLIGRAM(S): 10 INJECTION INTRAVENOUS at 05:42

## 2020-05-11 RX ADMIN — Medication 1 GRAM(S): at 17:48

## 2020-05-11 RX ADMIN — Medication 1 GRAM(S): at 23:58

## 2020-05-11 RX ADMIN — SODIUM CHLORIDE 75 MILLILITER(S): 9 INJECTION INTRAMUSCULAR; INTRAVENOUS; SUBCUTANEOUS at 05:43

## 2020-05-11 NOTE — PROGRESS NOTE ADULT - ATTENDING COMMENTS
35yo M with no PMH presented to OSH with dizziness x 2 weeks, easy bruising x 2-4 weeks. At home had a syncopal event and called 911, found to have hemoglobin 3s, Platelets 2K, no evidence of DIC. Noted mild petechiae and has had gum bleeding. These have improved. Noted dark stools for 2 weeks.   -Bone marrow evaluation (4/20) c/w severe aplastic anemia. Treatment plan: hATG, Cyclosporine, Eltrombopag.   - ATG test dose negative. Begin ATG, CSA, Eltrombopag on 4/23, today is day 18  -patient developed hives and short episode of wheezing with ATG infusion, which resolved- will continue Solumedrol pre-med with ATG(hold Prednisone), then resume Prednisone on day 5 to complete recommended course for prevention of serum sickness. Now on steroid taper  - IVF, mouth care; discontinued Allopurinol  -receiving transfusions if Hgb< 7, Plt <50k   - s/p vit K for elevated INR  -optho consult given hazy vision, suspect retinal bleed given low platelets. Platelet transfusional support for goal >50k.   -monitor for s/s of bleeding, gum bleeding intermittent   -Paranasal sinusitis on CT head(4/17)- cont Levaquin, begin Posaconazole; on Mepron and Acyclovir with immunosuppressive therapy.  -LFTs increasing, had switched posaconazole to caspofungin on 4/29 and when still uptrending subsequently held promacta on 5/1. Ordered abdominal sono on 5/3 - normal.  LFTs improving but currently still grade 3. Consider restarting when < grade 2  -some abdominal discomfort, likely gastritis type symptoms due to steroid, increased pepcid to BID and added carafate.  -Carafate helped significantly with abdominal symptoms, which are now mostly resolved.   -Cyclosporine level 283 5/8, cont same dose. Will repeat this week 33yo M with no PMH presented to OSH with dizziness x 2 weeks, easy bruising x 2-4 weeks. At home had a syncopal event and called 911, found to have hemoglobin 3's, Platelets 2K, no evidence of DIC. Noted mild petechiae and has had gum bleeding. These have improved. Noted dark stools for 2 weeks.   -Bone marrow evaluation (4/20) c/w severe aplastic anemia. Treatment plan: hATG, Cyclosporine, Eltrombopag.   - ATG test dose negative. Begin ATG, CSA, Eltrombopag on 4/23, today is day 19  -patient developed hives and short episode of wheezing with ATG infusion, which resolved- will continue Solumedrol pre-med with ATG(hold Prednisone), then resume Prednisone on day 5 to complete recommended course for prevention of serum sickness. Now on steroid taper  - IVF, mouth care; discontinued Allopurinol  -receiving transfusions if Hgb< 7, Plt <50k   - s/p vit K for elevated INR  -optho consult given hazy vision, suspect retinal bleed given low platelets. Platelet transfusional support for goal >50k.   -monitor for s/s of bleeding, gum bleeding intermittent   -Paranasal sinusitis on CT head(4/17)- cont Levaquin, begin Posaconazole; on Mepron and Acyclovir with immunosuppressive therapy.  -LFTs increasing, had switched posaconazole to caspofungin on 4/29 and when still uptrending subsequently held promacta on 5/1. Ordered abdominal sono on 5/3 - normal.  LFTs improving, now < grade 2. Restart Promacta on 5/12  -some abdominal discomfort, likely gastritis type symptoms due to steroid, increased pepcid to BID and added carafate.  -Carafate helped significantly with abdominal symptoms, which are now mostly resolved.   -Cyclosporine level 283(5/8), cont same dose. Will repeat this week  -OOB

## 2020-05-11 NOTE — PROGRESS NOTE ADULT - PROBLEM SELECTOR PLAN 1
4/20 BM Bx consistent with Aplastic Anemia  Status post Horse ATG 40mg/kg/day daily x 4 days (4/23- 4/26)  Continue Prednisone (taper to continue through 5/17) and CSA   5/8 CSA level 288 continue same dose and follow up next level on 5/13  Promacta on hold for increasing liver disfunction  Monitor CBC/Lytes and transfuse/replete PRN  Keep PLT> 50k for retinal hemorrhage  Hyperphosphatemia: Continue Phoslo.  Strict Is and Os/Daily weights/Mouth Care  IVF

## 2020-05-11 NOTE — PROGRESS NOTE ADULT - SUBJECTIVE AND OBJECTIVE BOX
Diagnosis: Aplastic anemia    Protocol/Chemo Regimen: ATG/Cyclosporine/Prednisone (Previously received Solumedrol Days 1-4) Promacta on hold as of today for Grade 3 transaminitis    Day: 19    Pt endorsed: no complaints    Review of Systems: Patient denies headache, dizziness, visual changes, chest pain, palpitations, SOB, abdominal pain, nausea, vomiting, diarrhea or dysuria.    Pain scale: denies     Diet: Regular    Allergies: No Known Allergies    ANTIMICROBIALS  acyclovir   Oral Tab/Cap 400 milliGRAM(s) Oral every 8 hours  atovaquone Suspension 750 milliGRAM(s) Oral every 12 hours  caspofungin IVPB      caspofungin IVPB 50 milliGRAM(s) IV Intermittent every 24 hours  levoFLOXacin  Tablet 500 milliGRAM(s) Oral every 24 hours    STANDING MEDICATIONS  antithymocyte globulin equine Skin Test 0.1 milliLiter(s) IntraDermal once  Biotene Dry Mouth Oral Rinse 5 milliLiter(s) Swish and Spit five times a day  calcium acetate 667 milliGRAM(s) Oral four times a day with meals  cycloSPORINE  (SandIMMUNE)  Solution 600 milliGRAM(s) Oral every 12 hours  famotidine    Tablet 20 milliGRAM(s) Oral two times a day  predniSONE   Tablet 40 milliGRAM(s) Oral daily  sodium chloride 0.9%. 1000 milliLiter(s) IV Continuous <Continuous>  sucralfate suspension 1 Gram(s) Oral every 6 hours    PRN MEDICATIONS  acetaminophen   Tablet .. 650 milliGRAM(s) Oral every 6 hours PRN  aluminum hydroxide/magnesium hydroxide/simethicone Suspension 30 milliLiter(s) Oral every 4 hours PRN  EPINEPHrine     1 mG/mL Injectable 0.3 milliGRAM(s) IntraMuscular once PRN  methylPREDNISolone sodium succinate IVPB 500 milliGRAM(s) IV Intermittent once PRN  ondansetron Injectable 4 milliGRAM(s) IV Push every 6 hours PRN    Vital Signs Last 24 Hrs  T(C): 36.8 (11 May 2020 05:30), Max: 37.1 (10 May 2020 13:24)  T(F): 98.2 (11 May 2020 05:30), Max: 98.7 (10 May 2020 13:24)  HR: 94 (11 May 2020 05:30) (70 - 109)  BP: 126/82 (11 May 2020 05:30) (126/82 - 145/88)  BP(mean): --  RR: 18 (11 May 2020 05:30) (18 - 18)  SpO2: 99% (11 May 2020 05:30) (98% - 99%)    PHYSICAL EXAM  General: NAD  HEENT: (+) icteric sclera  CV: (+) S1/S2 RRR  Lungs: clear to auscultation, no wheezes or rales  Abdomen: soft, non-tender, non-distended (+) BS  Ext: no edema  Skin: no rashes   Neuro: alert and oriented X 3, no focal deficits  PIV: CDI        RADIOLOGY & ADDITIONAL STUDIES:   US Abdomen Upper Quadrant Right (05.04.20 @ 09:45) >  Normal right upper quadrant abdominal ultrasound. Diagnosis: Aplastic anemia    Protocol/Chemo Regimen: ATG/Cyclosporine/Prednisone (Previously received Solumedrol Days 1-4) Promacta on hold as of today for Grade 3 transaminitis    Day: 19    Pt endorsed: no acute complaints today     Review of Systems: Patient denies headache, dizziness, visual changes, chest pain, palpitations, SOB, abdominal pain, nausea, vomiting, diarrhea or dysuria.    Pain scale: denies     Diet: Regular    Allergies: No Known Allergies    ANTIMICROBIALS  acyclovir   Oral Tab/Cap 400 milliGRAM(s) Oral every 8 hours  atovaquone Suspension 750 milliGRAM(s) Oral every 12 hours  caspofungin IVPB      caspofungin IVPB 50 milliGRAM(s) IV Intermittent every 24 hours  levoFLOXacin  Tablet 500 milliGRAM(s) Oral every 24 hours    STANDING MEDICATIONS  antithymocyte globulin equine Skin Test 0.1 milliLiter(s) IntraDermal once  Biotene Dry Mouth Oral Rinse 5 milliLiter(s) Swish and Spit five times a day  calcium acetate 667 milliGRAM(s) Oral four times a day with meals  cycloSPORINE  (SandIMMUNE)  Solution 600 milliGRAM(s) Oral every 12 hours  famotidine    Tablet 20 milliGRAM(s) Oral two times a day  predniSONE   Tablet 40 milliGRAM(s) Oral daily  sodium chloride 0.9%. 1000 milliLiter(s) IV Continuous <Continuous>  sucralfate suspension 1 Gram(s) Oral every 6 hours    PRN MEDICATIONS  acetaminophen   Tablet .. 650 milliGRAM(s) Oral every 6 hours PRN  aluminum hydroxide/magnesium hydroxide/simethicone Suspension 30 milliLiter(s) Oral every 4 hours PRN  EPINEPHrine     1 mG/mL Injectable 0.3 milliGRAM(s) IntraMuscular once PRN  methylPREDNISolone sodium succinate IVPB 500 milliGRAM(s) IV Intermittent once PRN  ondansetron Injectable 4 milliGRAM(s) IV Push every 6 hours PRN    Vital Signs Last 24 Hrs  T(C): 36.8 (11 May 2020 05:30), Max: 37.1 (10 May 2020 13:24)  T(F): 98.2 (11 May 2020 05:30), Max: 98.7 (10 May 2020 13:24)  HR: 94 (11 May 2020 05:30) (70 - 109)  BP: 126/82 (11 May 2020 05:30) (126/82 - 145/88)  BP(mean): --  RR: 18 (11 May 2020 05:30) (18 - 18)  SpO2: 99% (11 May 2020 05:30) (98% - 99%)    PHYSICAL EXAM  General: NAD  HEENT: Clear oropharynx  (+) icteric sclera  CV: (+) S1/S2 RRR  Lungs: clear to auscultation, no wheezes or rales  Abdomen: soft, non-tender, non-distended (+) BS  Ext: no edema  Skin: no rash  Neuro: alert and oriented X 3  PIV: CDI    LABS:                          6.9    2.24  )-----------( 38       ( 11 May 2020 07:24 )             20.3         Mean Cell Volume : 85.3 fl  Mean Cell Hemoglobin : 29.0 pg  Mean Cell Hemoglobin Concentration : 34.0 gm/dL  Auto Neutrophil # : 0.12 K/uL  Auto Lymphocyte # : 2.04 K/uL  Auto Monocyte # : 0.08 K/uL  Auto Eosinophil # : 0.00 K/uL  Auto Basophil # : 0.00 K/uL  Auto Neutrophil % : 5.3 %  Auto Lymphocyte % : 91.1 %  Auto Monocyte % : 3.6 %  Auto Eosinophil % : 0.0 %  Auto Basophil % : 0.0 %      05-11    138  |  102  |  18  ----------------------------<  98  3.9   |  22  |  0.90    Ca    9.1      11 May 2020 07:24  Phos  5.2     05-11  Mg     1.6     05-11    TPro  5.5<L>  /  Alb  3.3  /  TBili  1.9<H>  /  DBili  0.7<H>  /  AST  46<H>  /  ALT  199<H>  /  AlkPhos  84  05-11    PT/INR - ( 11 May 2020 07:24 )   PT: 10.7 sec;   INR: 0.93 ratio         PTT - ( 11 May 2020 07:24 )  PTT:23.8 sec      Uric Acid 3.6        RADIOLOGY & ADDITIONAL STUDIES:   US Abdomen Upper Quadrant Right (05.04.20 @ 09:45) >  Normal right upper quadrant abdominal ultrasound.

## 2020-05-12 LAB
ALBUMIN SERPL ELPH-MCNC: 3.8 G/DL — SIGNIFICANT CHANGE UP (ref 3.3–5)
ALP SERPL-CCNC: 85 U/L — SIGNIFICANT CHANGE UP (ref 40–120)
ALT FLD-CCNC: 193 U/L — HIGH (ref 10–45)
ANION GAP SERPL CALC-SCNC: 13 MMOL/L — SIGNIFICANT CHANGE UP (ref 5–17)
AST SERPL-CCNC: 41 U/L — HIGH (ref 10–40)
BASOPHILS # BLD AUTO: 0 K/UL — SIGNIFICANT CHANGE UP (ref 0–0.2)
BASOPHILS NFR BLD AUTO: 0 % — SIGNIFICANT CHANGE UP (ref 0–2)
BILIRUB DIRECT SERPL-MCNC: 0.8 MG/DL — HIGH (ref 0–0.2)
BILIRUB SERPL-MCNC: 2 MG/DL — HIGH (ref 0.2–1.2)
BUN SERPL-MCNC: 17 MG/DL — SIGNIFICANT CHANGE UP (ref 7–23)
CALCIUM SERPL-MCNC: 9.2 MG/DL — SIGNIFICANT CHANGE UP (ref 8.4–10.5)
CHLORIDE SERPL-SCNC: 101 MMOL/L — SIGNIFICANT CHANGE UP (ref 96–108)
CO2 SERPL-SCNC: 22 MMOL/L — SIGNIFICANT CHANGE UP (ref 22–31)
CREAT SERPL-MCNC: 0.85 MG/DL — SIGNIFICANT CHANGE UP (ref 0.5–1.3)
EOSINOPHIL # BLD AUTO: 0 K/UL — SIGNIFICANT CHANGE UP (ref 0–0.5)
EOSINOPHIL NFR BLD AUTO: 0 % — SIGNIFICANT CHANGE UP (ref 0–6)
GLUCOSE SERPL-MCNC: 137 MG/DL — HIGH (ref 70–99)
HCT VFR BLD CALC: 20.5 % — CRITICAL LOW (ref 39–50)
HGB BLD-MCNC: 7 G/DL — CRITICAL LOW (ref 13–17)
LDH SERPL L TO P-CCNC: 153 U/L — SIGNIFICANT CHANGE UP (ref 50–242)
LYMPHOCYTES # BLD AUTO: 1.2 K/UL — SIGNIFICANT CHANGE UP (ref 1–3.3)
LYMPHOCYTES # BLD AUTO: 87.6 % — HIGH (ref 13–44)
MAGNESIUM SERPL-MCNC: 1.4 MG/DL — LOW (ref 1.6–2.6)
MCHC RBC-ENTMCNC: 29.5 PG — SIGNIFICANT CHANGE UP (ref 27–34)
MCHC RBC-ENTMCNC: 34.1 GM/DL — SIGNIFICANT CHANGE UP (ref 32–36)
MCV RBC AUTO: 86.5 FL — SIGNIFICANT CHANGE UP (ref 80–100)
MONOCYTES # BLD AUTO: 0.07 K/UL — SIGNIFICANT CHANGE UP (ref 0–0.9)
MONOCYTES NFR BLD AUTO: 5.1 % — SIGNIFICANT CHANGE UP (ref 2–14)
NEUTROPHILS # BLD AUTO: 0.1 K/UL — LOW (ref 1.8–7.4)
NEUTROPHILS NFR BLD AUTO: 7.3 % — LOW (ref 43–77)
NRBC # BLD: 0 /100 WBCS — SIGNIFICANT CHANGE UP (ref 0–0)
PHOSPHATE SERPL-MCNC: 3.9 MG/DL — SIGNIFICANT CHANGE UP (ref 2.5–4.5)
PLATELET # BLD AUTO: 67 K/UL — LOW (ref 150–400)
POTASSIUM SERPL-MCNC: 3.9 MMOL/L — SIGNIFICANT CHANGE UP (ref 3.5–5.3)
POTASSIUM SERPL-SCNC: 3.9 MMOL/L — SIGNIFICANT CHANGE UP (ref 3.5–5.3)
PROT SERPL-MCNC: 6.4 G/DL — SIGNIFICANT CHANGE UP (ref 6–8.3)
RBC # BLD: 2.37 M/UL — LOW (ref 4.2–5.8)
RBC # FLD: 13.2 % — SIGNIFICANT CHANGE UP (ref 10.3–14.5)
SODIUM SERPL-SCNC: 136 MMOL/L — SIGNIFICANT CHANGE UP (ref 135–145)
URATE SERPL-MCNC: 3 MG/DL — LOW (ref 3.4–8.8)
WBC # BLD: 1.37 K/UL — LOW (ref 3.8–10.5)
WBC # FLD AUTO: 1.37 K/UL — LOW (ref 3.8–10.5)

## 2020-05-12 PROCEDURE — 99232 SBSQ HOSP IP/OBS MODERATE 35: CPT | Mod: GC

## 2020-05-12 RX ORDER — MAGNESIUM SULFATE 500 MG/ML
2 VIAL (ML) INJECTION ONCE
Refills: 0 | Status: COMPLETED | OUTPATIENT
Start: 2020-05-12 | End: 2020-05-12

## 2020-05-12 RX ORDER — ELTROMBOPAG OLAMINE 50 MG/1
150 TABLET, FILM COATED ORAL DAILY
Refills: 0 | Status: DISCONTINUED | OUTPATIENT
Start: 2020-05-12 | End: 2020-05-20

## 2020-05-12 RX ADMIN — ATOVAQUONE 750 MILLIGRAM(S): 750 SUSPENSION ORAL at 18:02

## 2020-05-12 RX ADMIN — CASPOFUNGIN ACETATE 260 MILLIGRAM(S): 7 INJECTION, POWDER, LYOPHILIZED, FOR SOLUTION INTRAVENOUS at 08:26

## 2020-05-12 RX ADMIN — Medication 30 MILLILITER(S): at 21:04

## 2020-05-12 RX ADMIN — Medication 5 MILLILITER(S): at 11:18

## 2020-05-12 RX ADMIN — Medication 5 MILLILITER(S): at 00:09

## 2020-05-12 RX ADMIN — Medication 400 MILLIGRAM(S): at 13:15

## 2020-05-12 RX ADMIN — Medication 1 GRAM(S): at 11:18

## 2020-05-12 RX ADMIN — ATOVAQUONE 750 MILLIGRAM(S): 750 SUSPENSION ORAL at 06:04

## 2020-05-12 RX ADMIN — CYCLOSPORINE 600 MILLIGRAM(S): 100 CAPSULE ORAL at 18:03

## 2020-05-12 RX ADMIN — Medication 5 MILLILITER(S): at 21:05

## 2020-05-12 RX ADMIN — CYCLOSPORINE 600 MILLIGRAM(S): 100 CAPSULE ORAL at 06:48

## 2020-05-12 RX ADMIN — Medication 1 GRAM(S): at 06:04

## 2020-05-12 RX ADMIN — Medication 20 MILLIGRAM(S): at 06:04

## 2020-05-12 RX ADMIN — Medication 50 GRAM(S): at 11:18

## 2020-05-12 RX ADMIN — Medication 400 MILLIGRAM(S): at 06:04

## 2020-05-12 RX ADMIN — Medication 1 GRAM(S): at 23:19

## 2020-05-12 RX ADMIN — Medication 5 MILLILITER(S): at 18:03

## 2020-05-12 RX ADMIN — FAMOTIDINE 20 MILLIGRAM(S): 10 INJECTION INTRAVENOUS at 06:04

## 2020-05-12 RX ADMIN — FAMOTIDINE 20 MILLIGRAM(S): 10 INJECTION INTRAVENOUS at 18:03

## 2020-05-12 RX ADMIN — Medication 400 MILLIGRAM(S): at 21:04

## 2020-05-12 RX ADMIN — ELTROMBOPAG OLAMINE 150 MILLIGRAM(S): 50 TABLET, FILM COATED ORAL at 12:41

## 2020-05-12 RX ADMIN — Medication 1 GRAM(S): at 18:03

## 2020-05-12 NOTE — PROGRESS NOTE ADULT - PROBLEM SELECTOR PLAN 3
Grade 3 transaminitis with hyperbilirubinemia, improving   Posaconazole changed to caspofungin  5/1 Promacta placed on hold. Once transaminitis is Grade 2 or less can consider restarting  5/4 US Abdomen unremarkable Grade 3 transaminitis with hyperbilirubinemia, much improved   Posaconazole changed to caspofungin  5/1 Promacta placed on hold. Once transaminitis is Grade 2 or less can consider restarting  5/4 US Abdomen unremarkable

## 2020-05-12 NOTE — CHART NOTE - NSCHARTNOTEFT_GEN_A_CORE
Nutrition Follow Up Note  Patient seen for: Nutrition follow up. Pt with Aplastic anemia receiving treatment. Pt recently with hyperphosphatemia-now resolved     Source: Pt    Diet : No concentrated phosphorus diet with ensure enlive 1 daily     Patient reports: No N+V, last BM yesterday x 2     PO intake : Pt reports appetite continues to improve and pt stated he has been eating 100% of meal and taking ensure enlive.       Weight: 215.4 lbs (4/18), 214.2 lbs (5/6), 213.8 lbs (5/11), weight relatively stable, minor fluctuations noted.     Pertinent Medications: MEDICATIONS  (STANDING):  acyclovir   Oral Tab/Cap 400 milliGRAM(s) Oral every 8 hours  antithymocyte globulin equine Skin Test 0.1 milliLiter(s) IntraDermal once  atovaquone Suspension 750 milliGRAM(s) Oral every 12 hours  Biotene Dry Mouth Oral Rinse 5 milliLiter(s) Swish and Spit five times a day  caspofungin IVPB      caspofungin IVPB 50 milliGRAM(s) IV Intermittent every 24 hours  cycloSPORINE  (SandIMMUNE)  Solution 600 milliGRAM(s) Oral every 12 hours  diphenhydrAMINE   Injectable 25 milliGRAM(s) IV Push once  eltrombopag 150 milliGRAM(s) Oral daily  famotidine    Tablet 20 milliGRAM(s) Oral two times a day  hydrocortisone sodium succinate Injectable 50 milliGRAM(s) IV Push once  levoFLOXacin  Tablet 500 milliGRAM(s) Oral every 24 hours  predniSONE   Tablet 20 milliGRAM(s) Oral daily  sodium chloride 0.9%. 1000 milliLiter(s) (75 mL/Hr) IV Continuous <Continuous>  sucralfate suspension 1 Gram(s) Oral every 6 hours    MEDICATIONS  (PRN):  acetaminophen   Tablet .. 650 milliGRAM(s) Oral every 6 hours PRN Temp greater or equal to 38C (100.4F), Mild Pain (1 - 3)  aluminum hydroxide/magnesium hydroxide/simethicone Suspension 30 milliLiter(s) Oral every 4 hours PRN Dyspepsia  diphenhydrAMINE   Injectable 25 milliGRAM(s) IV Push every 12 hours PRN Itching  EPINEPHrine     1 mG/mL Injectable 0.3 milliGRAM(s) IntraMuscular once PRN anaphylaxis  hydrocortisone sodium succinate Injectable 50 milliGRAM(s) IV Push every 12 hours PRN Pre transfusion for blood products  methylPREDNISolone sodium succinate IVPB 500 milliGRAM(s) IV Intermittent once PRN anaphylaxis  ondansetron Injectable 4 milliGRAM(s) IV Push every 6 hours PRN Nausea and/or Vomiting    Pertinent Labs: 05-12 @ 09:45: Na 136, BUN 17, Cr 0.85, <H>, K+ 3.9, Phos 3.9, Mg 1.4<L>, Alk Phos 85, ALT/SGPT 193<H>, AST/SGOT 41<H>, HbA1c --    Finger Sticks: none       Skin per nursing documentation: free of pressure injury   Edema: none     Estimated Needs:   [x ] no change since previous assessment  [ ] recalculated:     Previous Nutrition Diagnosis: Predicted suboptimal energy intake   Nutrition Diagnosis is: ongoing, addressed with supplements and preferences     New Nutrition Diagnosis:  Related to:    As evidenced by:      Interventions:     Recommend  1) Consider liberalizing no concentrated phosphorus diet as medically feasible-phosphorus levels within desirable limits today, continue ensure enlive 1 daily per pt preferences  2) Continue to encourage po intake and obtain/honor food preferences as able     Monitoring and Evaluation:     Continue to monitor Nutritional intake, Tolerance to diet prescription, weights, labs, skin integrity    RD remains available upon request and will follow up per protocol    Angie Maki R.D., [facility], Pager #[phone]

## 2020-05-12 NOTE — PROGRESS NOTE ADULT - ATTENDING COMMENTS
35yo M with no PMH presented to OSH with dizziness x 2 weeks, easy bruising x 2-4 weeks. At home had a syncopal event and called 911, found to have hemoglobin 3's, Platelets 2K, no evidence of DIC. Noted mild petechiae and has had gum bleeding. These have improved. Noted dark stools for 2 weeks.   -Bone marrow evaluation (4/20) c/w severe aplastic anemia. Treatment plan: hATG, Cyclosporine, Eltrombopag.   - ATG test dose negative. Begin ATG, CSA, Eltrombopag on 4/23, today is day 19  -patient developed hives and short episode of wheezing with ATG infusion, which resolved- will continue Solumedrol pre-med with ATG(hold Prednisone), then resume Prednisone on day 5 to complete recommended course for prevention of serum sickness. Now on steroid taper  - IVF, mouth care; discontinued Allopurinol  -receiving transfusions if Hgb< 7, Plt <50k   - s/p vit K for elevated INR  -optho consult given hazy vision, suspect retinal bleed given low platelets. Platelet transfusional support for goal >50k.   -monitor for s/s of bleeding, gum bleeding intermittent   -Paranasal sinusitis on CT head(4/17)- cont Levaquin, begin Posaconazole; on Mepron and Acyclovir with immunosuppressive therapy.  -LFTs increasing, had switched posaconazole to caspofungin on 4/29 and when still uptrending subsequently held promacta on 5/1. Ordered abdominal sono on 5/3 - normal.  LFTs improving, now < grade 2. Restart Promacta on 5/12  -some abdominal discomfort, likely gastritis type symptoms due to steroid, increased pepcid to BID and added carafate.  -Carafate helped significantly with abdominal symptoms, which are now mostly resolved.   -Cyclosporine level 283(5/8), cont same dose. Will repeat this week  -OOB 33yo M with no PMH presented to OSH with dizziness x 2 weeks, easy bruising x 2-4 weeks. At home had a syncopal event and called 911, found to have hemoglobin 3's, Platelets 2K, no evidence of DIC. Noted mild petechiae and has had gum bleeding. These have improved. Noted dark stools for 2 weeks.   -Bone marrow evaluation (4/20) c/w severe aplastic anemia. Treatment plan: hATG, Cyclosporine, Eltrombopag.   - ATG test dose negative. Begin ATG, CSA, Eltrombopag on 4/23, today is day 20  -patient developed hives and short episode of wheezing with ATG infusion, which resolved- will continue Solumedrol pre-med with ATG(hold Prednisone), then resume Prednisone on day 5 to complete recommended course for prevention of serum sickness. Now on steroid taper  - IVF, mouth care; discontinued Allopurinol  -receiving transfusions if Hgb< 7, Plt <50k   - s/p vit K for elevated INR  -optho consult given hazy vision, suspect retinal bleed given low platelets. Platelet transfusional support for goal >50k.   -monitor for s/s of bleeding, gum bleeding intermittent   -Paranasal sinusitis on CT head(4/17)- cont Levaquin, begin Posaconazole; on Mepron and Acyclovir with immunosuppressive therapy.  -LFTs increasing, had switched posaconazole to caspofungin on 4/29 and when still uptrending subsequently held promacta on 5/1. Ordered abdominal sono on 5/3 - normal.  LFTs improving, now < grade 2. Restart Promacta on 5/12  -some abdominal discomfort, likely gastritis type symptoms due to steroid, increased pepcid to BID and added carafate.  -Carafate helped significantly with abdominal symptoms, which are now mostly resolved.   -Cyclosporine level 283(5/8), cont same dose. Will repeat this week  -OOB/ambulate

## 2020-05-12 NOTE — PROGRESS NOTE ADULT - PROBLEM SELECTOR PLAN 1
4/20 BM Bx consistent with Aplastic Anemia  Status post Horse ATG 40mg/kg/day daily x 4 days (4/23- 4/26)  Continue Prednisone (taper to continue through 5/17) and CSA   5/8 CSA level 288 continue same dose and follow up next level on 5/13  Promacta on hold for increasing liver disfunction  Monitor CBC/Lytes and transfuse/replete PRN  Keep PLT> 50k for retinal hemorrhage  Strict Is and Os/Daily weights/Mouth Care  IVF 4/20 BM Bx consistent with Aplastic Anemia  Status post Horse ATG 40mg/kg/day daily x 4 days (4/23- 4/26)  Continue Prednisone (taper to continue through 5/17) and CSA   5/8 CSA level 288 continue same dose and follow up next level on 5/13  Promacta was held for transaminitis, now improved. Resume Promacta on 5/12   Monitor CBC/Lytes and transfuse/replete PRN  Hypomagnesemia: Mg 2 g IVPB x1  Keep PLT> 50k for retinal hemorrhage  Strict Is and Os/Daily weights/Mouth Care  IVF

## 2020-05-12 NOTE — PROGRESS NOTE ADULT - SUBJECTIVE AND OBJECTIVE BOX
Diagnosis: Aplastic anemia    Protocol/Chemo Regimen: ATG/Cyclosporine/Prednisone (Previously received Solumedrol Days 1-4) Promacta on hold as of today for Grade 3 transaminitis    Day: 20    Pt endorsed: no acute complaints today     Review of Systems: Patient denies headache, dizziness, visual changes, chest pain, palpitations, SOB, abdominal pain, nausea, vomiting, diarrhea or dysuria.    Pain scale: denies     Diet: Regular    Allergies: No Known Allergies    ANTIMICROBIALS  acyclovir   Oral Tab/Cap 400 milliGRAM(s) Oral every 8 hours  atovaquone Suspension 750 milliGRAM(s) Oral every 12 hours  caspofungin IVPB      caspofungin IVPB 50 milliGRAM(s) IV Intermittent every 24 hours  levoFLOXacin  Tablet 500 milliGRAM(s) Oral every 24 hours      STANDING MEDICATIONS  antithymocyte globulin equine Skin Test 0.1 milliLiter(s) IntraDermal once  Biotene Dry Mouth Oral Rinse 5 milliLiter(s) Swish and Spit five times a day  cycloSPORINE  (SandIMMUNE)  Solution 600 milliGRAM(s) Oral every 12 hours  diphenhydrAMINE   Injectable 25 milliGRAM(s) IV Push once  famotidine    Tablet 20 milliGRAM(s) Oral two times a day  hydrocortisone sodium succinate Injectable 50 milliGRAM(s) IV Push once  predniSONE   Tablet 20 milliGRAM(s) Oral daily  sodium chloride 0.9%. 1000 milliLiter(s) IV Continuous <Continuous>  sucralfate suspension 1 Gram(s) Oral every 6 hours      PRN MEDICATIONS  acetaminophen   Tablet .. 650 milliGRAM(s) Oral every 6 hours PRN  aluminum hydroxide/magnesium hydroxide/simethicone Suspension 30 milliLiter(s) Oral every 4 hours PRN  diphenhydrAMINE   Injectable 25 milliGRAM(s) IV Push every 12 hours PRN  EPINEPHrine     1 mG/mL Injectable 0.3 milliGRAM(s) IntraMuscular once PRN  hydrocortisone sodium succinate Injectable 50 milliGRAM(s) IV Push every 12 hours PRN  methylPREDNISolone sodium succinate IVPB 500 milliGRAM(s) IV Intermittent once PRN  ondansetron Injectable 4 milliGRAM(s) IV Push every 6 hours PRN      Vital Signs Last 24 Hrs  T(C): 36.1 (12 May 2020 04:41), Max: 36.8 (11 May 2020 18:09)  T(F): 96.9 (12 May 2020 04:41), Max: 98.3 (11 May 2020 18:09)  HR: 65 (12 May 2020 04:41) (65 - 96)  BP: 147/79 (12 May 2020 04:41) (131/83 - 147/79)  BP(mean): --  RR: 18 (12 May 2020 04:41) (18 - 18)  SpO2: 99% (12 May 2020 04:41) (97% - 100%)      PHYSICAL EXAM  General: NAD  HEENT: Clear oropharynx  (+) icteric sclera; no ulcer or erythema  CV: (+) S1/S2 RRR  Lungs: clear to auscultation, no wheezes or rales  Abdomen: soft, non-tender, non-distended (+) BS  Ext: no edema  Skin: no rash  Neuro: alert and oriented X 3  PIV: CDI      LABS:                        8.1    1.22  )-----------( 99       ( 11 May 2020 10:41 )             23.2         Mean Cell Volume : 85.6 fl  Mean Cell Hemoglobin : 29.9 pg  Mean Cell Hemoglobin Concentration : 34.9 gm/dL  Auto Neutrophil # : x  Auto Lymphocyte # : x  Auto Monocyte # : x  Auto Eosinophil # : x  Auto Basophil # : x  Auto Neutrophil % : x  Auto Lymphocyte % : x  Auto Monocyte % : x  Auto Eosinophil % : x  Auto Basophil % : x      05-11    138  |  102  |  18  ----------------------------<  98  3.9   |  22  |  0.90    Ca    9.1      11 May 2020 07:24  Phos  5.2     05-11  Mg     1.6     05-11    TPro  5.5<L>  /  Alb  3.3  /  TBili  1.9<H>  /  DBili  0.7<H>  /  AST  46<H>  /  ALT  199<H>  /  AlkPhos  84  05-11          PT/INR - ( 11 May 2020 07:24 )   PT: 10.7 sec;   INR: 0.93 ratio         PTT - ( 11 May 2020 07:24 )  PTT:23.8 sec          RADIOLOGY & ADDITIONAL STUDIES:    US Abdomen Upper Quadrant Right (05.04.20 @ 09:45) >  Normal right upper quadrant abdominal ultrasound. Diagnosis: Aplastic anemia    Protocol/Chemo Regimen: ATG/Cyclosporine/Prednisone (Previously received Solumedrol Days 1-4) Promacta on hold as of today for Grade 3 transaminitis    Day: 20    Pt endorsed: no acute complaints today     Review of Systems: Patient denies headache, dizziness, visual changes, chest pain, palpitations, SOB, abdominal pain, nausea, vomiting, diarrhea or dysuria.    Pain scale: denies     Diet: Regular    Allergies: No Known Allergies    ANTIMICROBIALS  acyclovir   Oral Tab/Cap 400 milliGRAM(s) Oral every 8 hours  atovaquone Suspension 750 milliGRAM(s) Oral every 12 hours  caspofungin IVPB      caspofungin IVPB 50 milliGRAM(s) IV Intermittent every 24 hours  levoFLOXacin  Tablet 500 milliGRAM(s) Oral every 24 hours      STANDING MEDICATIONS  antithymocyte globulin equine Skin Test 0.1 milliLiter(s) IntraDermal once  Biotene Dry Mouth Oral Rinse 5 milliLiter(s) Swish and Spit five times a day  cycloSPORINE  (SandIMMUNE)  Solution 600 milliGRAM(s) Oral every 12 hours  diphenhydrAMINE   Injectable 25 milliGRAM(s) IV Push once  famotidine    Tablet 20 milliGRAM(s) Oral two times a day  hydrocortisone sodium succinate Injectable 50 milliGRAM(s) IV Push once  predniSONE   Tablet 20 milliGRAM(s) Oral daily  sodium chloride 0.9%. 1000 milliLiter(s) IV Continuous <Continuous>  sucralfate suspension 1 Gram(s) Oral every 6 hours  Promacta 150 mg po QD       PRN MEDICATIONS  acetaminophen   Tablet .. 650 milliGRAM(s) Oral every 6 hours PRN  aluminum hydroxide/magnesium hydroxide/simethicone Suspension 30 milliLiter(s) Oral every 4 hours PRN  diphenhydrAMINE   Injectable 25 milliGRAM(s) IV Push every 12 hours PRN  EPINEPHrine     1 mG/mL Injectable 0.3 milliGRAM(s) IntraMuscular once PRN  hydrocortisone sodium succinate Injectable 50 milliGRAM(s) IV Push every 12 hours PRN  methylPREDNISolone sodium succinate IVPB 500 milliGRAM(s) IV Intermittent once PRN  ondansetron Injectable 4 milliGRAM(s) IV Push every 6 hours PRN      Vital Signs Last 24 Hrs  T(C): 36.1 (12 May 2020 04:41), Max: 36.8 (11 May 2020 18:09)  T(F): 96.9 (12 May 2020 04:41), Max: 98.3 (11 May 2020 18:09)  HR: 65 (12 May 2020 04:41) (65 - 96)  BP: 147/79 (12 May 2020 04:41) (131/83 - 147/79)  BP(mean): --  RR: 18 (12 May 2020 04:41) (18 - 18)  SpO2: 99% (12 May 2020 04:41) (97% - 100%)      PHYSICAL EXAM  General: NAD  HEENT: Clear oropharynx  (+) icteric sclera; no ulcer or erythema  CV: (+) S1/S2 RRR  Lungs: clear to auscultation, no wheezes or rales  Abdomen: soft, non-tender, non-distended (+) BS  Ext: no edema  Skin: no rash  Neuro: alert and oriented X 3  PIV: CDI      LABS:                          7.0    1.37  )-----------( 67       ( 12 May 2020 09:45 )             20.5         Mean Cell Volume : 86.5 fl  Mean Cell Hemoglobin : 29.5 pg  Mean Cell Hemoglobin Concentration : 34.1 gm/dL  Auto Neutrophil # : x  Auto Lymphocyte # : x  Auto Monocyte # : x  Auto Eosinophil # : x  Auto Basophil # : x  Auto Neutrophil % : x  Auto Lymphocyte % : x  Auto Monocyte % : x  Auto Eosinophil % : x  Auto Basophil % : x      05-12    136  |  101  |  17  ----------------------------<  137<H>  3.9   |  22  |  0.85    Ca    9.2      12 May 2020 09:45  Phos  3.9     05-12  Mg     1.4     05-12    TPro  6.4  /  Alb  3.8  /  TBili  2.0<H>  /  DBili  0.8<H>  /  AST  41<H>  /  ALT  193<H>  /  AlkPhos  85  05-12        PT/INR - ( 11 May 2020 07:24 )   PT: 10.7 sec;   INR: 0.93 ratio         PTT - ( 11 May 2020 07:24 )  PTT:23.8 sec      Uric Acid 3.0    COVID-19 PCR . (04.29.20 @ 11:32)    COVID-19 PCR: NotDetec        RADIOLOGY & ADDITIONAL STUDIES:    US Abdomen Upper Quadrant Right (05.04.20 @ 09:45) >  Normal right upper quadrant abdominal ultrasound. Diagnosis: Aplastic anemia    Protocol/Chemo Regimen: ATG/Cyclosporine/Prednisone (Previously received Solumedrol Days 1-4) Promacta on hold as of today for Grade 3 transaminitis    Day: 20    Pt endorsed: no acute complaints today     Review of Systems: Patient denies headache, dizziness, visual changes, chest pain, palpitations, SOB, abdominal pain, nausea, vomiting, diarrhea or dysuria.    Pain scale: denies     Diet: Regular    Allergies: No Known Allergies    ANTIMICROBIALS  acyclovir   Oral Tab/Cap 400 milliGRAM(s) Oral every 8 hours  atovaquone Suspension 750 milliGRAM(s) Oral every 12 hours  caspofungin IVPB      caspofungin IVPB 50 milliGRAM(s) IV Intermittent every 24 hours  levoFLOXacin  Tablet 500 milliGRAM(s) Oral every 24 hours      STANDING MEDICATIONS  antithymocyte globulin equine Skin Test 0.1 milliLiter(s) IntraDermal once  Biotene Dry Mouth Oral Rinse 5 milliLiter(s) Swish and Spit five times a day  cycloSPORINE  (SandIMMUNE)  Solution 600 milliGRAM(s) Oral every 12 hours  diphenhydrAMINE   Injectable 25 milliGRAM(s) IV Push once  famotidine    Tablet 20 milliGRAM(s) Oral two times a day  hydrocortisone sodium succinate Injectable 50 milliGRAM(s) IV Push once  predniSONE   Tablet 20 milliGRAM(s) Oral daily  sodium chloride 0.9%. 1000 milliLiter(s) IV Continuous <Continuous>  sucralfate suspension 1 Gram(s) Oral every 6 hours  Promacta 150 mg po QD       PRN MEDICATIONS  acetaminophen   Tablet .. 650 milliGRAM(s) Oral every 6 hours PRN  aluminum hydroxide/magnesium hydroxide/simethicone Suspension 30 milliLiter(s) Oral every 4 hours PRN  diphenhydrAMINE   Injectable 25 milliGRAM(s) IV Push every 12 hours PRN  EPINEPHrine     1 mG/mL Injectable 0.3 milliGRAM(s) IntraMuscular once PRN  hydrocortisone sodium succinate Injectable 50 milliGRAM(s) IV Push every 12 hours PRN  methylPREDNISolone sodium succinate IVPB 500 milliGRAM(s) IV Intermittent once PRN  ondansetron Injectable 4 milliGRAM(s) IV Push every 6 hours PRN      Vital Signs Last 24 Hrs  T(C): 36.1 (12 May 2020 04:41), Max: 36.8 (11 May 2020 18:09)  T(F): 96.9 (12 May 2020 04:41), Max: 98.3 (11 May 2020 18:09)  HR: 65 (12 May 2020 04:41) (65 - 96)  BP: 147/79 (12 May 2020 04:41) (131/83 - 147/79)  BP(mean): --  RR: 18 (12 May 2020 04:41) (18 - 18)  SpO2: 99% (12 May 2020 04:41) (97% - 100%)      PHYSICAL EXAM  General: NAD  HEENT: Clear oropharynx, no ulcer or erythema;  (+) icteric sclera  CV: (+) S1/S2 RRR  Lungs: clear to auscultation, no wheezes or rales  Abdomen: soft, non-tender, non-distended (+) BS  Ext: no edema  Skin: no rash  Neuro: alert and oriented X 3  PIV: CDI      LABS:                          7.0    1.37  )-----------( 67       ( 12 May 2020 09:45 )             20.5         Mean Cell Volume : 86.5 fl  Mean Cell Hemoglobin : 29.5 pg  Mean Cell Hemoglobin Concentration : 34.1 gm/dL  Auto Neutrophil # : x  Auto Lymphocyte # : x  Auto Monocyte # : x  Auto Eosinophil # : x  Auto Basophil # : x  Auto Neutrophil % : x  Auto Lymphocyte % : x  Auto Monocyte % : x  Auto Eosinophil % : x  Auto Basophil % : x      05-12    136  |  101  |  17  ----------------------------<  137<H>  3.9   |  22  |  0.85    Ca    9.2      12 May 2020 09:45  Phos  3.9     05-12  Mg     1.4     05-12    TPro  6.4  /  Alb  3.8  /  TBili  2.0<H>  /  DBili  0.8<H>  /  AST  41<H>  /  ALT  193<H>  /  AlkPhos  85  05-12        PT/INR - ( 11 May 2020 07:24 )   PT: 10.7 sec;   INR: 0.93 ratio         PTT - ( 11 May 2020 07:24 )  PTT:23.8 sec      Uric Acid 3.0    COVID-19 PCR . (04.29.20 @ 11:32)    COVID-19 PCR: NotDetec        RADIOLOGY & ADDITIONAL STUDIES:    US Abdomen Upper Quadrant Right (05.04.20 @ 09:45) >  Normal right upper quadrant abdominal ultrasound.

## 2020-05-13 LAB
ALBUMIN SERPL ELPH-MCNC: 3.4 G/DL — SIGNIFICANT CHANGE UP (ref 3.3–5)
ALP SERPL-CCNC: 88 U/L — SIGNIFICANT CHANGE UP (ref 40–120)
ALT FLD-CCNC: 170 U/L — HIGH (ref 10–45)
ANION GAP SERPL CALC-SCNC: 14 MMOL/L — SIGNIFICANT CHANGE UP (ref 5–17)
AST SERPL-CCNC: 41 U/L — HIGH (ref 10–40)
BASOPHILS # BLD AUTO: 0.02 K/UL — SIGNIFICANT CHANGE UP (ref 0–0.2)
BASOPHILS NFR BLD AUTO: 0.9 % — SIGNIFICANT CHANGE UP (ref 0–2)
BILIRUB DIRECT SERPL-MCNC: 0.7 MG/DL — HIGH (ref 0–0.2)
BILIRUB SERPL-MCNC: 1.7 MG/DL — HIGH (ref 0.2–1.2)
BUN SERPL-MCNC: 21 MG/DL — SIGNIFICANT CHANGE UP (ref 7–23)
CALCIUM SERPL-MCNC: 8.7 MG/DL — SIGNIFICANT CHANGE UP (ref 8.4–10.5)
CHLORIDE SERPL-SCNC: 103 MMOL/L — SIGNIFICANT CHANGE UP (ref 96–108)
CO2 SERPL-SCNC: 23 MMOL/L — SIGNIFICANT CHANGE UP (ref 22–31)
CREAT SERPL-MCNC: 0.93 MG/DL — SIGNIFICANT CHANGE UP (ref 0.5–1.3)
CYCLOSPORINE SER-MCNC: 420 NG/ML — HIGH (ref 150–400)
EOSINOPHIL # BLD AUTO: 0 K/UL — SIGNIFICANT CHANGE UP (ref 0–0.5)
EOSINOPHIL NFR BLD AUTO: 0 % — SIGNIFICANT CHANGE UP (ref 0–6)
GLUCOSE SERPL-MCNC: 98 MG/DL — SIGNIFICANT CHANGE UP (ref 70–99)
HCT VFR BLD CALC: 18.7 % — CRITICAL LOW (ref 39–50)
HGB BLD-MCNC: 6.6 G/DL — CRITICAL LOW (ref 13–17)
LDH SERPL L TO P-CCNC: 136 U/L — SIGNIFICANT CHANGE UP (ref 50–242)
LYMPHOCYTES # BLD AUTO: 1.73 K/UL — SIGNIFICANT CHANGE UP (ref 1–3.3)
LYMPHOCYTES # BLD AUTO: 95.5 % — HIGH (ref 13–44)
MAGNESIUM SERPL-MCNC: 1.7 MG/DL — SIGNIFICANT CHANGE UP (ref 1.6–2.6)
MCHC RBC-ENTMCNC: 30.4 PG — SIGNIFICANT CHANGE UP (ref 27–34)
MCHC RBC-ENTMCNC: 35.3 GM/DL — SIGNIFICANT CHANGE UP (ref 32–36)
MCV RBC AUTO: 86.2 FL — SIGNIFICANT CHANGE UP (ref 80–100)
MONOCYTES # BLD AUTO: 0.03 K/UL — SIGNIFICANT CHANGE UP (ref 0–0.9)
MONOCYTES NFR BLD AUTO: 1.8 % — LOW (ref 2–14)
NEUTROPHILS # BLD AUTO: 0.03 K/UL — LOW (ref 1.8–7.4)
NEUTROPHILS NFR BLD AUTO: 1.8 % — LOW (ref 43–77)
OB PNL STL: NEGATIVE — SIGNIFICANT CHANGE UP
PHOSPHATE SERPL-MCNC: 5.1 MG/DL — HIGH (ref 2.5–4.5)
PLATELET # BLD AUTO: 56 K/UL — LOW (ref 150–400)
POTASSIUM SERPL-MCNC: 4 MMOL/L — SIGNIFICANT CHANGE UP (ref 3.5–5.3)
POTASSIUM SERPL-SCNC: 4 MMOL/L — SIGNIFICANT CHANGE UP (ref 3.5–5.3)
PROT SERPL-MCNC: 6 G/DL — SIGNIFICANT CHANGE UP (ref 6–8.3)
RBC # BLD: 2.17 M/UL — LOW (ref 4.2–5.8)
RBC # FLD: 13.2 % — SIGNIFICANT CHANGE UP (ref 10.3–14.5)
SODIUM SERPL-SCNC: 140 MMOL/L — SIGNIFICANT CHANGE UP (ref 135–145)
URATE SERPL-MCNC: 3.1 MG/DL — LOW (ref 3.4–8.8)
WBC # BLD: 1.81 K/UL — LOW (ref 3.8–10.5)
WBC # FLD AUTO: 1.81 K/UL — LOW (ref 3.8–10.5)

## 2020-05-13 PROCEDURE — 99232 SBSQ HOSP IP/OBS MODERATE 35: CPT | Mod: GC

## 2020-05-13 RX ORDER — CYCLOSPORINE 100 MG/1
500 CAPSULE ORAL EVERY 12 HOURS
Refills: 0 | Status: DISCONTINUED | OUTPATIENT
Start: 2020-05-13 | End: 2020-05-13

## 2020-05-13 RX ORDER — CYCLOSPORINE 100 MG/1
500 CAPSULE ORAL EVERY 12 HOURS
Refills: 0 | Status: DISCONTINUED | OUTPATIENT
Start: 2020-05-13 | End: 2020-05-18

## 2020-05-13 RX ADMIN — Medication 5 MILLILITER(S): at 00:35

## 2020-05-13 RX ADMIN — Medication 25 MILLIGRAM(S): at 12:14

## 2020-05-13 RX ADMIN — CASPOFUNGIN ACETATE 260 MILLIGRAM(S): 7 INJECTION, POWDER, LYOPHILIZED, FOR SOLUTION INTRAVENOUS at 08:24

## 2020-05-13 RX ADMIN — Medication 400 MILLIGRAM(S): at 22:16

## 2020-05-13 RX ADMIN — CYCLOSPORINE 500 MILLIGRAM(S): 100 CAPSULE ORAL at 17:28

## 2020-05-13 RX ADMIN — Medication 5 MILLILITER(S): at 08:25

## 2020-05-13 RX ADMIN — Medication 5 MILLILITER(S): at 22:15

## 2020-05-13 RX ADMIN — SODIUM CHLORIDE 75 MILLILITER(S): 9 INJECTION INTRAMUSCULAR; INTRAVENOUS; SUBCUTANEOUS at 05:01

## 2020-05-13 RX ADMIN — FAMOTIDINE 20 MILLIGRAM(S): 10 INJECTION INTRAVENOUS at 04:59

## 2020-05-13 RX ADMIN — FAMOTIDINE 20 MILLIGRAM(S): 10 INJECTION INTRAVENOUS at 17:28

## 2020-05-13 RX ADMIN — Medication 50 MILLIGRAM(S): at 11:44

## 2020-05-13 RX ADMIN — Medication 400 MILLIGRAM(S): at 13:21

## 2020-05-13 RX ADMIN — ATOVAQUONE 750 MILLIGRAM(S): 750 SUSPENSION ORAL at 05:00

## 2020-05-13 RX ADMIN — Medication 650 MILLIGRAM(S): at 11:44

## 2020-05-13 RX ADMIN — ATOVAQUONE 750 MILLIGRAM(S): 750 SUSPENSION ORAL at 17:27

## 2020-05-13 RX ADMIN — Medication 20 MILLIGRAM(S): at 04:59

## 2020-05-13 RX ADMIN — Medication 1 GRAM(S): at 17:28

## 2020-05-13 RX ADMIN — Medication 5 MILLILITER(S): at 11:43

## 2020-05-13 RX ADMIN — Medication 1 GRAM(S): at 05:00

## 2020-05-13 RX ADMIN — Medication 5 MILLILITER(S): at 17:27

## 2020-05-13 RX ADMIN — Medication 400 MILLIGRAM(S): at 04:59

## 2020-05-13 RX ADMIN — CYCLOSPORINE 600 MILLIGRAM(S): 100 CAPSULE ORAL at 05:00

## 2020-05-13 RX ADMIN — Medication 1 GRAM(S): at 22:15

## 2020-05-13 RX ADMIN — ELTROMBOPAG OLAMINE 150 MILLIGRAM(S): 50 TABLET, FILM COATED ORAL at 11:43

## 2020-05-13 RX ADMIN — Medication 1 GRAM(S): at 11:43

## 2020-05-13 NOTE — PROGRESS NOTE ADULT - PROBLEM SELECTOR PLAN 4
Intraretinal hemorrhage from thrombocytopenia and anemia  Transfuse to keep PLT >50k   Opthalmology consultation appreciated. Will need to follow up as outpatient

## 2020-05-13 NOTE — PROGRESS NOTE ADULT - ATTENDING COMMENTS
33yo M with no PMH presented to OSH with dizziness x 2 weeks, easy bruising x 2-4 weeks. At home had a syncopal event and called 911, found to have hemoglobin 3's, Platelets 2K, no evidence of DIC. Noted mild petechiae and has had gum bleeding. These have improved. Noted dark stools for 2 weeks.   -Bone marrow evaluation (4/20) c/w severe aplastic anemia. Treatment plan: hATG, Cyclosporine, Eltrombopag.   - ATG test dose negative. Begin ATG, CSA, Eltrombopag on 4/23, today is day 20  -patient developed hives and short episode of wheezing with ATG infusion, which resolved- will continue Solumedrol pre-med with ATG(hold Prednisone), then resume Prednisone on day 5 to complete recommended course for prevention of serum sickness. Now on steroid taper  - IVF, mouth care; discontinued Allopurinol  -receiving transfusions if Hgb< 7, Plt <50k   - s/p vit K for elevated INR  -optho consult given hazy vision, suspect retinal bleed given low platelets. Platelet transfusional support for goal >50k.   -monitor for s/s of bleeding, gum bleeding intermittent   -Paranasal sinusitis on CT head(4/17)- cont Levaquin, begin Posaconazole; on Mepron and Acyclovir with immunosuppressive therapy.  -LFTs increasing, had switched posaconazole to caspofungin on 4/29 and when still uptrending subsequently held promacta on 5/1. Ordered abdominal sono on 5/3 - normal.  LFTs improving, now < grade 2. Restart Promacta on 5/12  -some abdominal discomfort, likely gastritis type symptoms due to steroid, increased pepcid to BID and added carafate.  -Carafate helped significantly with abdominal symptoms, which are now mostly resolved.   -Cyclosporine level 283(5/8), cont same dose. Will repeat this week  -OOB/ambulate 35yo M with no PMH presented to OSH with dizziness x 2 weeks, easy bruising x 2-4 weeks. At home had a syncopal event and called 911, found to have hemoglobin 3's, Platelets 2K, no evidence of DIC. Noted mild petechiae and has had gum bleeding. These have improved. Noted dark stools for 2 weeks.   -Bone marrow evaluation (4/20) c/w severe aplastic anemia. Treatment plan: hATG, Cyclosporine, Eltrombopag.   - ATG test dose negative. Begin ATG, CSA, Eltrombopag on 4/23, today is day 21  -patient developed hives and short episode of wheezing with ATG infusion, which resolved- will continue Solumedrol pre-med with ATG(hold Prednisone), then resume Prednisone on day 5 to complete recommended course for prevention of serum sickness. Now on steroid taper  - IVF, mouth care; discontinued Allopurinol  -receiving transfusions if Hgb< 7, Plt <50k   - s/p vit K for elevated INR  -optho consult given hazy vision, suspect retinal bleed given low platelets. Platelet transfusional support for goal >50k.   -monitor for s/s of bleeding, gum bleeding intermittent   -Paranasal sinusitis on CT head(4/17)- cont Levaquin, begin Posaconazole; on Mepron and Acyclovir with immunosuppressive therapy.  -LFTs increasing, had switched posaconazole to caspofungin on 4/29 and when still uptrending subsequently held promacta on 5/1. Ordered abdominal sono on 5/3 - normal.  LFTs improving, now < grade 2. Restart Promacta on 5/12  -some abdominal discomfort, likely gastritis type symptoms due to steroid, increased pepcid to BID and added carafate.  -Carafate helped significantly with abdominal symptoms, which are now mostly resolved.   -Cyclosporine level monitored, adjust level to keep therapeutic  -OOB/ambulate

## 2020-05-13 NOTE — PROGRESS NOTE ADULT - SUBJECTIVE AND OBJECTIVE BOX
Diagnosis: Aplastic Anemia     Protocol/Chemo Regimen: ATG/Cyclosporine/Prednisone (Previously received Solumedrol Days 1-4) Promacta on hold as of today for Grade 3 transaminitis      Day: 21    Pt endorsed: No acute complaints     Review of Systems:  Patient denies headache, dizziness, visual changes, chest pain, palpitations, SOB, abdominal pain, nausea, vomiting, diarrhea or dysuria.    Pain scale: Denies     Diet: Regular     Allergies    No Known Allergies    Intolerances        ANTIMICROBIALS  acyclovir   Oral Tab/Cap 400 milliGRAM(s) Oral every 8 hours  atovaquone Suspension 750 milliGRAM(s) Oral every 12 hours  caspofungin IVPB      caspofungin IVPB 50 milliGRAM(s) IV Intermittent every 24 hours  levoFLOXacin  Tablet 500 milliGRAM(s) Oral every 24 hours      HEME/ONC MEDICATIONS  eltrombopag 150 milliGRAM(s) Oral daily      STANDING MEDICATIONS  antithymocyte globulin equine Skin Test 0.1 milliLiter(s) IntraDermal once  Biotene Dry Mouth Oral Rinse 5 milliLiter(s) Swish and Spit five times a day  cycloSPORINE  (SandIMMUNE)  Solution 600 milliGRAM(s) Oral every 12 hours  diphenhydrAMINE   Injectable 25 milliGRAM(s) IV Push once  famotidine    Tablet 20 milliGRAM(s) Oral two times a day  hydrocortisone sodium succinate Injectable 50 milliGRAM(s) IV Push once  predniSONE   Tablet 20 milliGRAM(s) Oral daily  sodium chloride 0.9%. 1000 milliLiter(s) IV Continuous <Continuous>  sucralfate suspension 1 Gram(s) Oral every 6 hours      PRN MEDICATIONS  acetaminophen   Tablet .. 650 milliGRAM(s) Oral every 6 hours PRN  aluminum hydroxide/magnesium hydroxide/simethicone Suspension 30 milliLiter(s) Oral every 4 hours PRN  diphenhydrAMINE   Injectable 25 milliGRAM(s) IV Push every 12 hours PRN  EPINEPHrine     1 mG/mL Injectable 0.3 milliGRAM(s) IntraMuscular once PRN  hydrocortisone sodium succinate Injectable 50 milliGRAM(s) IV Push every 12 hours PRN  methylPREDNISolone sodium succinate IVPB 500 milliGRAM(s) IV Intermittent once PRN  ondansetron Injectable 4 milliGRAM(s) IV Push every 6 hours PRN        Vital Signs Last 24 Hrs  T(C): 36.5 (13 May 2020 09:16), Max: 36.9 (12 May 2020 13:44)  T(F): 97.7 (13 May 2020 09:16), Max: 98.4 (12 May 2020 13:44)  HR: 84 (13 May 2020 09:16) (74 - 98)  BP: 124/78 (13 May 2020 09:16) (119/74 - 150/91)  BP(mean): --  RR: 18 (13 May 2020 09:16) (18 - 18)  SpO2: 98% (13 May 2020 09:16) (98% - 100%)    PHYSICAL EXAM  General: NAD  Oral: no erythema or ulcers  HEENT: PERRLA, EOMI, clear oropharynx  Neck: supple  CV: (+) S1/S2 RRR  Lungs: clear to auscultation, no wheezes or rales  Abdomen: soft, non-tender, non-distended (+) BS  Ext: no edema  Skin: no rash  Neuro: alert and oriented X 3, no focal deficits  Central Line:     RECENT CULTURES:    Culture - Stool (04.26.20 @ 04:51)    Specimen Source: .Stool Feces    Culture Results:   No enteric pathogens isolated.  (Stool culture examined for Salmonella,  Shigella, Campylobacter, Aeromonas, Plesiomonas,  Vibrio, E.coli O157 and Yersinia)    COVID-19 PCR . (04.29.20 @ 11:32)    COVID-19 PCR: NotDetec: This test has been validated by [employer] to be accurate;  though it has not been FDA cleared/approved by the usual pathway.  As with all laboratory tests, results should be correlated with clinical  findings.  https://www.fda.gov/media/986997/download  https://www.fda.gov/media/622423/download            LABS:                        6.6    1.81  )-----------( 56       ( 13 May 2020 07:22 )             18.7         Mean Cell Volume : 86.2 fl  Mean Cell Hemoglobin : 30.4 pg  Mean Cell Hemoglobin Concentration : 35.3 gm/dL  Auto Neutrophil # : 0.03 K/uL  Auto Lymphocyte # : 1.73 K/uL  Auto Monocyte # : 0.03 K/uL  Auto Eosinophil # : 0.00 K/uL  Auto Basophil # : 0.02 K/uL  Auto Neutrophil % : 1.8 %  Auto Lymphocyte % : 95.5 %  Auto Monocyte % : 1.8 %  Auto Eosinophil % : 0.0 %  Auto Basophil % : 0.9 %      05-13    140  |  103  |  21  ----------------------------<  98  4.0   |  23  |  0.93    Ca    8.7      13 May 2020 07:22  Phos  5.1     05-13  Mg     1.7     05-13    TPro  6.0  /  Alb  3.4  /  TBili  1.7<H>  /  DBili  0.7<H>  /  AST  41<H>  /  ALT  170<H>  /  AlkPhos  88  05-13      Mg 1.7  Phos 5.1        Uric Acid 3.1        RADIOLOGY & ADDITIONAL STUDIES:  US Abdomen Upper Quadrant Right (05.04.20 @ 09:45) >  Liver: The right lower liver measures 14.6 cm in length. No focal lesion. Liver contour is smooth. The main portal vein is patent with hepatopedal blood flow. Within normal limits.  Bile ducts: Normal caliber. Common bile duct measures 3 mm.   Gallbladder: No cholelithiasis. Gallbladder wall is not thickened. Within normal limits.      Pancreas: Visualized portions are within normal limits. Distal tail not seen.  Right kidney: 12.5 cm. No hydronephrosis.  Ascites: None.  IVC: Visualized portions are within normal limits. Diagnosis: Aplastic Anemia     Protocol/Chemo Regimen: ATG/Cyclosporine/Prednisone (Previously received Solumedrol Days 1-4) Promacta on hold as of today for Grade 3 transaminitis    Day: 21    Pt endorsed: No acute complaints     Review of Systems:  Patient denies  chest pain, palpitations, SOB, abdominal pain, nausea, vomiting or diarrhea     Pain scale: Denies     Diet: Regular     Allergies    No Known Allergies    Intolerances        ANTIMICROBIALS  acyclovir   Oral Tab/Cap 400 milliGRAM(s) Oral every 8 hours  atovaquone Suspension 750 milliGRAM(s) Oral every 12 hours  caspofungin IVPB      caspofungin IVPB 50 milliGRAM(s) IV Intermittent every 24 hours  levoFLOXacin  Tablet 500 milliGRAM(s) Oral every 24 hours      HEME/ONC MEDICATIONS  eltrombopag 150 milliGRAM(s) Oral daily      STANDING MEDICATIONS  antithymocyte globulin equine Skin Test 0.1 milliLiter(s) IntraDermal once  Biotene Dry Mouth Oral Rinse 5 milliLiter(s) Swish and Spit five times a day  cycloSPORINE  (SandIMMUNE)  Solution 600 milliGRAM(s) Oral every 12 hours  diphenhydrAMINE   Injectable 25 milliGRAM(s) IV Push once  famotidine    Tablet 20 milliGRAM(s) Oral two times a day  hydrocortisone sodium succinate Injectable 50 milliGRAM(s) IV Push once  predniSONE   Tablet 20 milliGRAM(s) Oral daily  sodium chloride 0.9%. 1000 milliLiter(s) IV Continuous <Continuous>  sucralfate suspension 1 Gram(s) Oral every 6 hours      PRN MEDICATIONS  acetaminophen   Tablet .. 650 milliGRAM(s) Oral every 6 hours PRN  aluminum hydroxide/magnesium hydroxide/simethicone Suspension 30 milliLiter(s) Oral every 4 hours PRN  diphenhydrAMINE   Injectable 25 milliGRAM(s) IV Push every 12 hours PRN  EPINEPHrine     1 mG/mL Injectable 0.3 milliGRAM(s) IntraMuscular once PRN  hydrocortisone sodium succinate Injectable 50 milliGRAM(s) IV Push every 12 hours PRN  methylPREDNISolone sodium succinate IVPB 500 milliGRAM(s) IV Intermittent once PRN  ondansetron Injectable 4 milliGRAM(s) IV Push every 6 hours PRN        Vital Signs Last 24 Hrs  T(C): 36.5 (13 May 2020 09:16), Max: 36.9 (12 May 2020 13:44)  T(F): 97.7 (13 May 2020 09:16), Max: 98.4 (12 May 2020 13:44)  HR: 84 (13 May 2020 09:16) (74 - 98)  BP: 124/78 (13 May 2020 09:16) (119/74 - 150/91)  BP(mean): --  RR: 18 (13 May 2020 09:16) (18 - 18)  SpO2: 98% (13 May 2020 09:16) (98% - 100%)    PHYSICAL EXAM  General: NAD  Oral: no erythema or ulcers  HEENT: PERRLA, EOMI, clear oropharynx  Neck: supple  CV: (+) S1/S2 RRR  Lungs: clear to auscultation, no wheezes or rales  Abdomen: soft, non-tender, non-distended (+) BS  Ext: no edema  Skin: no rash  Neuro: alert and oriented X 3, no focal deficits  Central Line:     RECENT CULTURES:    Culture - Stool (04.26.20 @ 04:51)    Specimen Source: .Stool Feces    Culture Results:   No enteric pathogens isolated.  (Stool culture examined for Salmonella,  Shigella, Campylobacter, Aeromonas, Plesiomonas,  Vibrio, E.coli O157 and Yersinia)    COVID-19 PCR . (04.29.20 @ 11:32)    COVID-19 PCR: NotDetec: This test has been validated by [employer] to be accurate;  though it has not been FDA cleared/approved by the usual pathway.  As with all laboratory tests, results should be correlated with clinical  findings.  https://www.fda.gov/media/972365/download  https://www.fda.gov/media/907281/download            LABS:                        6.6    1.81  )-----------( 56       ( 13 May 2020 07:22 )             18.7         Mean Cell Volume : 86.2 fl  Mean Cell Hemoglobin : 30.4 pg  Mean Cell Hemoglobin Concentration : 35.3 gm/dL  Auto Neutrophil # : 0.03 K/uL  Auto Lymphocyte # : 1.73 K/uL  Auto Monocyte # : 0.03 K/uL  Auto Eosinophil # : 0.00 K/uL  Auto Basophil # : 0.02 K/uL  Auto Neutrophil % : 1.8 %  Auto Lymphocyte % : 95.5 %  Auto Monocyte % : 1.8 %  Auto Eosinophil % : 0.0 %  Auto Basophil % : 0.9 %      05-13    140  |  103  |  21  ----------------------------<  98  4.0   |  23  |  0.93    Ca    8.7      13 May 2020 07:22  Phos  5.1     05-13  Mg     1.7     05-13    TPro  6.0  /  Alb  3.4  /  TBili  1.7<H>  /  DBili  0.7<H>  /  AST  41<H>  /  ALT  170<H>  /  AlkPhos  88  05-13      Mg 1.7  Phos 5.1        Uric Acid 3.1        RADIOLOGY & ADDITIONAL STUDIES:  US Abdomen Upper Quadrant Right (05.04.20 @ 09:45) >  Liver: The right lower liver measures 14.6 cm in length. No focal lesion. Liver contour is smooth. The main portal vein is patent with hepatopedal blood flow. Within normal limits.  Bile ducts: Normal caliber. Common bile duct measures 3 mm.   Gallbladder: No cholelithiasis. Gallbladder wall is not thickened. Within normal limits.      Pancreas: Visualized portions are within normal limits. Distal tail not seen.  Right kidney: 12.5 cm. No hydronephrosis.  Ascites: None.  IVC: Visualized portions are within normal limits. Diagnosis: Aplastic Anemia     Protocol/Chemo Regimen: ATG/Cyclosporine/Prednisone (Previously received Solumedrol Days 1-4) Promacta on hold as of today for Grade 3 transaminitis    Day: 21    Pt endorsed: No acute complaints     Review of Systems:  Patient denies  chest pain, palpitations, SOB, abdominal pain, nausea, vomiting or diarrhea     Pain scale: Denies     Diet: Regular     Allergies    No Known Allergies    Intolerances        ANTIMICROBIALS  acyclovir   Oral Tab/Cap 400 milliGRAM(s) Oral every 8 hours  atovaquone Suspension 750 milliGRAM(s) Oral every 12 hours  caspofungin IVPB      caspofungin IVPB 50 milliGRAM(s) IV Intermittent every 24 hours  levoFLOXacin  Tablet 500 milliGRAM(s) Oral every 24 hours      HEME/ONC MEDICATIONS  eltrombopag 150 milliGRAM(s) Oral daily      STANDING MEDICATIONS  antithymocyte globulin equine Skin Test 0.1 milliLiter(s) IntraDermal once  Biotene Dry Mouth Oral Rinse 5 milliLiter(s) Swish and Spit five times a day  cycloSPORINE  (SandIMMUNE)  Solution 600 milliGRAM(s) Oral every 12 hours  diphenhydrAMINE   Injectable 25 milliGRAM(s) IV Push once  famotidine    Tablet 20 milliGRAM(s) Oral two times a day  hydrocortisone sodium succinate Injectable 50 milliGRAM(s) IV Push once  predniSONE   Tablet 20 milliGRAM(s) Oral daily  sodium chloride 0.9%. 1000 milliLiter(s) IV Continuous <Continuous>  sucralfate suspension 1 Gram(s) Oral every 6 hours      PRN MEDICATIONS  acetaminophen   Tablet .. 650 milliGRAM(s) Oral every 6 hours PRN  aluminum hydroxide/magnesium hydroxide/simethicone Suspension 30 milliLiter(s) Oral every 4 hours PRN  diphenhydrAMINE   Injectable 25 milliGRAM(s) IV Push every 12 hours PRN  EPINEPHrine     1 mG/mL Injectable 0.3 milliGRAM(s) IntraMuscular once PRN  hydrocortisone sodium succinate Injectable 50 milliGRAM(s) IV Push every 12 hours PRN  methylPREDNISolone sodium succinate IVPB 500 milliGRAM(s) IV Intermittent once PRN  ondansetron Injectable 4 milliGRAM(s) IV Push every 6 hours PRN        Vital Signs Last 24 Hrs  T(C): 36.5 (13 May 2020 09:16), Max: 36.9 (12 May 2020 13:44)  T(F): 97.7 (13 May 2020 09:16), Max: 98.4 (12 May 2020 13:44)  HR: 84 (13 May 2020 09:16) (74 - 98)  BP: 124/78 (13 May 2020 09:16) (119/74 - 150/91)  BP(mean): --  RR: 18 (13 May 2020 09:16) (18 - 18)  SpO2: 98% (13 May 2020 09:16) (98% - 100%)    PHYSICAL EXAM  General: NAD  CV: (+) S1/S2 RRR  Lungs: clear to auscultation, no wheezes or rales  Abdomen: soft, non-tender, non-distended (+) BS  Ext: no edema  Skin: no rash  Neuro: alert and oriented X 3, no focal deficits  PIV: C/D/I     RECENT CULTURES:    Culture - Stool (04.26.20 @ 04:51)    Specimen Source: .Stool Feces    Culture Results:   No enteric pathogens isolated.  (Stool culture examined for Salmonella,  Shigella, Campylobacter, Aeromonas, Plesiomonas,  Vibrio, E.coli O157 and Yersinia)    COVID-19 PCR . (04.29.20 @ 11:32)    COVID-19 PCR: NotDetec: This test has been validated by [employer] to be accurate;  though it has not been FDA cleared/approved by the usual pathway.  As with all laboratory tests, results should be correlated with clinical  findings.  https://www.fda.gov/media/506162/download  https://www.fda.gov/media/955287/download            LABS:                        6.6    1.81  )-----------( 56       ( 13 May 2020 07:22 )             18.7         Mean Cell Volume : 86.2 fl  Mean Cell Hemoglobin : 30.4 pg  Mean Cell Hemoglobin Concentration : 35.3 gm/dL  Auto Neutrophil # : 0.03 K/uL  Auto Lymphocyte # : 1.73 K/uL  Auto Monocyte # : 0.03 K/uL  Auto Eosinophil # : 0.00 K/uL  Auto Basophil # : 0.02 K/uL  Auto Neutrophil % : 1.8 %  Auto Lymphocyte % : 95.5 %  Auto Monocyte % : 1.8 %  Auto Eosinophil % : 0.0 %  Auto Basophil % : 0.9 %      05-13    140  |  103  |  21  ----------------------------<  98  4.0   |  23  |  0.93    Ca    8.7      13 May 2020 07:22  Phos  5.1     05-13  Mg     1.7     05-13    TPro  6.0  /  Alb  3.4  /  TBili  1.7<H>  /  DBili  0.7<H>  /  AST  41<H>  /  ALT  170<H>  /  AlkPhos  88  05-13      Mg 1.7  Phos 5.1        Uric Acid 3.1        RADIOLOGY & ADDITIONAL STUDIES:  US Abdomen Upper Quadrant Right (05.04.20 @ 09:45) >  Liver: The right lower liver measures 14.6 cm in length. No focal lesion. Liver contour is smooth. The main portal vein is patent with hepatopedal blood flow. Within normal limits.  Bile ducts: Normal caliber. Common bile duct measures 3 mm.   Gallbladder: No cholelithiasis. Gallbladder wall is not thickened. Within normal limits.      Pancreas: Visualized portions are within normal limits. Distal tail not seen.  Right kidney: 12.5 cm. No hydronephrosis.  Ascites: None.  IVC: Visualized portions are within normal limits.

## 2020-05-13 NOTE — PROGRESS NOTE ADULT - PROBLEM SELECTOR PLAN 2
The patient is neutropenic, afebrile  If febrile Pan Cx, CXR and change Levaquin to Cefepime  Continue Levaquin, Mepron, Caspofungin and Acyclovir for prophylaxis  COVID-19 surveillance (-) x 2 ( 4/22, 4/29)

## 2020-05-13 NOTE — PROGRESS NOTE ADULT - ASSESSMENT
This is a 33 yo M with no significant PMHx admitted for management of newly diagnosed Aplastic Anemia. The patient is status post Equine ATG and continues CSA/Prednisone/Eltrombopag. The patients hospital course has been complicated by retinal hemorrhage, ATG reaction and liver dysfunction with hyperbilirubinemia and grade 3 transaminitis likely drug induced. The patient has pancytopenia secondary to chemotherapy and/or disease process.

## 2020-05-13 NOTE — PROGRESS NOTE ADULT - PROBLEM SELECTOR PLAN 1
4/20 BM Bx consistent with Aplastic Anemia  Status post Horse ATG 40mg/kg/day daily x 4 days (4/23- 4/26)  Continue Prednisone (taper to continue through 5/17) and CSA   5/8 CSA level 288 continue same dose and follow up next level on 5/13  Promacta was held for transaminitis, now improved. Promacta resumed on 5/12   Monitor CBC/Lytes and transfuse/replete PRN  Hypomagnesemia: Mg 2 g IVPB x1  Keep PLT> 50k for retinal hemorrhage  Strict Is and Os/Daily weights/Mouth Care  IVF hydration 4/20 BM Bx consistent with Aplastic Anemia  Status post Horse ATG 40mg/kg/day daily x 4 days (4/23- 4/26)  Continue Prednisone (taper to continue through 5/17) and CSA   5/8 CSA level 288 continue same dose and follow up next level on 5/13  Promacta was held for transaminitis, now improved. Promacta resumed on 5/12   Monitor CBC/Lytes and transfuse/replete PRN  Keep PLT> 50k for retinal hemorrhage  Strict Is and Os/Daily weights/Mouth Care  IVF hydration 4/20 BM Bx consistent with Aplastic Anemia  Status post Horse ATG 40mg/kg/day daily x 4 days (4/23- 4/26)  Continue Prednisone (taper to continue through 5/17)   5/13 CSA level = 420, CSA dose increased to 500 mg twice daily.   Promacta previously held for transaminitis, resumed on 5/12   Monitor CBC/Lytes and transfuse/replete PRN  Keep PLT> 50k for retinal hemorrhage  Strict Is and Os/Daily weights/Mouth Care  IVF hydration  5/13 Anemia - 1 unit of PRBC today

## 2020-05-14 LAB
ALBUMIN SERPL ELPH-MCNC: 3.4 G/DL — SIGNIFICANT CHANGE UP (ref 3.3–5)
ALP SERPL-CCNC: 82 U/L — SIGNIFICANT CHANGE UP (ref 40–120)
ALT FLD-CCNC: 145 U/L — HIGH (ref 10–45)
ANION GAP SERPL CALC-SCNC: 12 MMOL/L — SIGNIFICANT CHANGE UP (ref 5–17)
APTT BLD: 24.8 SEC — LOW (ref 27.5–36.3)
AST SERPL-CCNC: 30 U/L — SIGNIFICANT CHANGE UP (ref 10–40)
BASOPHILS # BLD AUTO: 0 K/UL — SIGNIFICANT CHANGE UP (ref 0–0.2)
BASOPHILS NFR BLD AUTO: 0 % — SIGNIFICANT CHANGE UP (ref 0–2)
BILIRUB DIRECT SERPL-MCNC: 0.6 MG/DL — HIGH (ref 0–0.2)
BILIRUB SERPL-MCNC: 1.6 MG/DL — HIGH (ref 0.2–1.2)
BUN SERPL-MCNC: 21 MG/DL — SIGNIFICANT CHANGE UP (ref 7–23)
CALCIUM SERPL-MCNC: 9 MG/DL — SIGNIFICANT CHANGE UP (ref 8.4–10.5)
CHLORIDE SERPL-SCNC: 103 MMOL/L — SIGNIFICANT CHANGE UP (ref 96–108)
CO2 SERPL-SCNC: 23 MMOL/L — SIGNIFICANT CHANGE UP (ref 22–31)
CREAT SERPL-MCNC: 0.91 MG/DL — SIGNIFICANT CHANGE UP (ref 0.5–1.3)
EOSINOPHIL # BLD AUTO: 0 K/UL — SIGNIFICANT CHANGE UP (ref 0–0.5)
EOSINOPHIL NFR BLD AUTO: 0 % — SIGNIFICANT CHANGE UP (ref 0–6)
FIBRINOGEN PPP-MCNC: 586 MG/DL — HIGH (ref 350–510)
GLUCOSE SERPL-MCNC: 98 MG/DL — SIGNIFICANT CHANGE UP (ref 70–99)
HCT VFR BLD CALC: 21.2 % — LOW (ref 39–50)
HGB BLD-MCNC: 7.3 G/DL — LOW (ref 13–17)
INR BLD: 0.95 RATIO — SIGNIFICANT CHANGE UP (ref 0.88–1.16)
LDH SERPL L TO P-CCNC: 151 U/L — SIGNIFICANT CHANGE UP (ref 50–242)
LYMPHOCYTES # BLD AUTO: 1.89 K/UL — SIGNIFICANT CHANGE UP (ref 1–3.3)
LYMPHOCYTES # BLD AUTO: 89.2 % — HIGH (ref 13–44)
MAGNESIUM SERPL-MCNC: 1.4 MG/DL — LOW (ref 1.6–2.6)
MCHC RBC-ENTMCNC: 29.2 PG — SIGNIFICANT CHANGE UP (ref 27–34)
MCHC RBC-ENTMCNC: 34.4 GM/DL — SIGNIFICANT CHANGE UP (ref 32–36)
MCV RBC AUTO: 84.8 FL — SIGNIFICANT CHANGE UP (ref 80–100)
MONOCYTES # BLD AUTO: 0.11 K/UL — SIGNIFICANT CHANGE UP (ref 0–0.9)
MONOCYTES NFR BLD AUTO: 5.2 % — SIGNIFICANT CHANGE UP (ref 2–14)
NEUTROPHILS # BLD AUTO: 0.12 K/UL — LOW (ref 1.8–7.4)
NEUTROPHILS NFR BLD AUTO: 5.6 % — LOW (ref 43–77)
NRBC # BLD: 0 /100 WBCS — SIGNIFICANT CHANGE UP (ref 0–0)
PHOSPHATE SERPL-MCNC: 4.7 MG/DL — HIGH (ref 2.5–4.5)
PLATELET # BLD AUTO: 40 K/UL — LOW (ref 150–400)
POTASSIUM SERPL-MCNC: 4 MMOL/L — SIGNIFICANT CHANGE UP (ref 3.5–5.3)
POTASSIUM SERPL-SCNC: 4 MMOL/L — SIGNIFICANT CHANGE UP (ref 3.5–5.3)
PROT SERPL-MCNC: 6 G/DL — SIGNIFICANT CHANGE UP (ref 6–8.3)
PROTHROM AB SERPL-ACNC: 10.8 SEC — SIGNIFICANT CHANGE UP (ref 10–12.9)
RBC # BLD: 2.5 M/UL — LOW (ref 4.2–5.8)
RBC # FLD: 14.1 % — SIGNIFICANT CHANGE UP (ref 10.3–14.5)
SARS-COV-2 RNA SPEC QL NAA+PROBE: SIGNIFICANT CHANGE UP
SODIUM SERPL-SCNC: 138 MMOL/L — SIGNIFICANT CHANGE UP (ref 135–145)
URATE SERPL-MCNC: 2.7 MG/DL — LOW (ref 3.4–8.8)
WBC # BLD: 2.12 K/UL — LOW (ref 3.8–10.5)
WBC # FLD AUTO: 2.12 K/UL — LOW (ref 3.8–10.5)

## 2020-05-14 PROCEDURE — 99232 SBSQ HOSP IP/OBS MODERATE 35: CPT | Mod: GC

## 2020-05-14 RX ORDER — MAGNESIUM SULFATE 500 MG/ML
2 VIAL (ML) INJECTION ONCE
Refills: 0 | Status: COMPLETED | OUTPATIENT
Start: 2020-05-14 | End: 2020-05-14

## 2020-05-14 RX ADMIN — CYCLOSPORINE 500 MILLIGRAM(S): 100 CAPSULE ORAL at 17:13

## 2020-05-14 RX ADMIN — Medication 50 GRAM(S): at 10:06

## 2020-05-14 RX ADMIN — Medication 20 MILLIGRAM(S): at 05:55

## 2020-05-14 RX ADMIN — Medication 1 GRAM(S): at 11:51

## 2020-05-14 RX ADMIN — Medication 650 MILLIGRAM(S): at 11:48

## 2020-05-14 RX ADMIN — ATOVAQUONE 750 MILLIGRAM(S): 750 SUSPENSION ORAL at 05:55

## 2020-05-14 RX ADMIN — ELTROMBOPAG OLAMINE 150 MILLIGRAM(S): 50 TABLET, FILM COATED ORAL at 11:51

## 2020-05-14 RX ADMIN — FAMOTIDINE 20 MILLIGRAM(S): 10 INJECTION INTRAVENOUS at 05:55

## 2020-05-14 RX ADMIN — Medication 5 MILLILITER(S): at 17:11

## 2020-05-14 RX ADMIN — Medication 30 MILLILITER(S): at 20:48

## 2020-05-14 RX ADMIN — CYCLOSPORINE 500 MILLIGRAM(S): 100 CAPSULE ORAL at 05:56

## 2020-05-14 RX ADMIN — Medication 400 MILLIGRAM(S): at 05:55

## 2020-05-14 RX ADMIN — Medication 1 GRAM(S): at 23:04

## 2020-05-14 RX ADMIN — CASPOFUNGIN ACETATE 260 MILLIGRAM(S): 7 INJECTION, POWDER, LYOPHILIZED, FOR SOLUTION INTRAVENOUS at 07:55

## 2020-05-14 RX ADMIN — FAMOTIDINE 20 MILLIGRAM(S): 10 INJECTION INTRAVENOUS at 17:12

## 2020-05-14 RX ADMIN — Medication 400 MILLIGRAM(S): at 13:25

## 2020-05-14 RX ADMIN — Medication 1 GRAM(S): at 05:55

## 2020-05-14 RX ADMIN — Medication 400 MILLIGRAM(S): at 23:04

## 2020-05-14 RX ADMIN — Medication 50 MILLIGRAM(S): at 11:47

## 2020-05-14 RX ADMIN — Medication 5 MILLILITER(S): at 07:56

## 2020-05-14 RX ADMIN — Medication 5 MILLILITER(S): at 11:52

## 2020-05-14 RX ADMIN — Medication 5 MILLILITER(S): at 20:47

## 2020-05-14 RX ADMIN — ATOVAQUONE 750 MILLIGRAM(S): 750 SUSPENSION ORAL at 17:11

## 2020-05-14 RX ADMIN — Medication 1 GRAM(S): at 17:12

## 2020-05-14 NOTE — PROGRESS NOTE ADULT - PROBLEM SELECTOR PLAN 1
4/20 BM Bx consistent with Aplastic Anemia  Status post Horse ATG 40mg/kg/day daily x 4 days (4/23- 4/26)  Continue Prednisone (taper to continue through 5/17)   5/13 CSA level = 420, CSA dose increased to 500 mg twice daily.   Promacta previously held for transaminitis, resumed on 5/12   Monitor CBC/Lytes and transfuse/replete PRN  Keep PLT> 50k for retinal hemorrhage  Thrombocytopenia: PLT x1  Strict Is and Os/Daily weights/Mouth Care  IVF hydration  5/13 Anemia - 1 unit of PRBC today 4/20 BM Bx consistent with Aplastic Anemia  Status post Horse ATG 40mg/kg/day daily x 4 days (4/23- 4/26)  Continue Prednisone (taper to continue through 5/17)   5/13 CSA level = 420, CSA dose increased to 500 mg twice daily.   Promacta previously held for transaminitis, resumed on 5/12   Monitor CBC/Lytes and transfuse/replete PRN  Keep PLT> 50k for retinal hemorrhage  Thrombocytopenia: PLT x1  Hypomagnesemia: Mg sulfate 2 g IVPB x1  No concentrated phos diet for mild hyperphosphatemia  Strict Is and Os/Daily weights/Mouth Care  IVF hydration

## 2020-05-14 NOTE — PROGRESS NOTE ADULT - PROBLEM SELECTOR PLAN 2
The patient is neutropenic, afebrile  If febrile Pan Cx, CXR and change Levaquin to Cefepime  Continue Levaquin, Mepron, Caspofungin and Acyclovir for prophylaxis  COVID-19 surveillance (-) x 2 ( 4/22, 4/29) The patient is neutropenic, afebrile  If febrile Pan Cx, CXR and change Levaquin to Cefepime  Continue Levaquin, Mepron, Caspofungin and Acyclovir for prophylaxis  COVID-19 surveillance (-) x 2 ( 4/22, 4/29), x1 today

## 2020-05-14 NOTE — PROGRESS NOTE ADULT - ATTENDING COMMENTS
35yo M with no PMH presented to OSH with dizziness x 2 weeks, easy bruising x 2-4 weeks. At home had a syncopal event and called 911, found to have hemoglobin 3's, Platelets 2K, no evidence of DIC. Noted mild petechiae and has had gum bleeding. These have improved. Noted dark stools for 2 weeks.   -Bone marrow evaluation (4/20) c/w severe aplastic anemia. Treatment plan: hATG, Cyclosporine, Eltrombopag.   - ATG test dose negative. Begin ATG, CSA, Eltrombopag on 4/23, today is day 21  -patient developed hives and short episode of wheezing with ATG infusion, which resolved- will continue Solumedrol pre-med with ATG(hold Prednisone), then resume Prednisone on day 5 to complete recommended course for prevention of serum sickness. Now on steroid taper  - IVF, mouth care; discontinued Allopurinol  -receiving transfusions if Hgb< 7, Plt <50k   - s/p vit K for elevated INR  -optho consult given hazy vision, suspect retinal bleed given low platelets. Platelet transfusional support for goal >50k.   -monitor for s/s of bleeding, gum bleeding intermittent   -Paranasal sinusitis on CT head(4/17)- cont Levaquin, begin Posaconazole; on Mepron and Acyclovir with immunosuppressive therapy.  -LFTs increasing, had switched posaconazole to caspofungin on 4/29 and when still uptrending subsequently held promacta on 5/1. Ordered abdominal sono on 5/3 - normal.  LFTs improving, now < grade 2. Restart Promacta on 5/12  -some abdominal discomfort, likely gastritis type symptoms due to steroid, increased pepcid to BID and added carafate.  -Carafate helped significantly with abdominal symptoms, which are now mostly resolved.   -Cyclosporine level monitored, adjust level to keep therapeutic  -OOB/ambulate 33yo M with no PMH presented to OSH with dizziness x 2 weeks, easy bruising x 2-4 weeks. At home had a syncopal event and called 911, found to have hemoglobin 3's, Platelets 2K, no evidence of DIC. Diagnosed with AA on BM bx 4/20/20 -started horse ATG/Cyclosporine/Eltrombopag day +22    -receiving transfusions if Hgb< 7, Plt <50k   -optho consult given hazy vision, suspect retinal bleed given low platelets. Platelet transfusional support for goal >50k.   -Paranasal sinusitis on CT head(4/17)- cont Levaquin, begin Posaconazole; on Mepron and Acyclovir with immunosuppressive therapy.  -LFTs grade 3 - switched posaconazole to caspofungin on 4/29 and when still uptrending subsequently held promacta on 5/1. Ordered abdominal sono on 5/3 - normal.  LFTs improving, now < grade 2. Restart Promacta on 5/12  -abdominal discomfort, likely gastritis -improved, on Pepcid and Carafate  -Cyclosporine level monitored q2-3 days, adjust level to keep therapeutic  -OOB/ambulate

## 2020-05-14 NOTE — PROGRESS NOTE ADULT - SUBJECTIVE AND OBJECTIVE BOX
Diagnosis: Aplastic Anemia     Protocol/Chemo Regimen: ATG/Cyclosporine/Prednisone (Previously received Solumedrol Days 1-4) Promacta on hold as of today for Grade 3 transaminitis    Day: 22    Pt endorsed: No acute complaints     Review of Systems:  Patient denies  chest pain, palpitations, SOB, abdominal pain, nausea, vomiting or diarrhea     Pain scale: Denies     Diet: Regular     Allergies: No Known Allergies      ANTIMICROBIALS  acyclovir   Oral Tab/Cap 400 milliGRAM(s) Oral every 8 hours  atovaquone Suspension 750 milliGRAM(s) Oral every 12 hours  caspofungin IVPB      caspofungin IVPB 50 milliGRAM(s) IV Intermittent every 24 hours  levoFLOXacin  Tablet 500 milliGRAM(s) Oral every 24 hours      HEME/ONC MEDICATIONS  eltrombopag 150 milliGRAM(s) Oral daily      STANDING MEDICATIONS  antithymocyte globulin equine Skin Test 0.1 milliLiter(s) IntraDermal once  Biotene Dry Mouth Oral Rinse 5 milliLiter(s) Swish and Spit five times a day  cycloSPORINE  (SandIMMUNE) 500 milliGRAM(s) Oral every 12 hours  diphenhydrAMINE   Injectable 25 milliGRAM(s) IV Push once  famotidine    Tablet 20 milliGRAM(s) Oral two times a day  hydrocortisone sodium succinate Injectable 50 milliGRAM(s) IV Push once  sodium chloride 0.9%. 1000 milliLiter(s) IV Continuous <Continuous>  sucralfate suspension 1 Gram(s) Oral every 6 hours      PRN MEDICATIONS  acetaminophen   Tablet .. 650 milliGRAM(s) Oral every 6 hours PRN  aluminum hydroxide/magnesium hydroxide/simethicone Suspension 30 milliLiter(s) Oral every 4 hours PRN  diphenhydrAMINE   Injectable 25 milliGRAM(s) IV Push every 12 hours PRN  EPINEPHrine     1 mG/mL Injectable 0.3 milliGRAM(s) IntraMuscular once PRN  hydrocortisone sodium succinate Injectable 50 milliGRAM(s) IV Push every 12 hours PRN  methylPREDNISolone sodium succinate IVPB 500 milliGRAM(s) IV Intermittent once PRN  ondansetron Injectable 4 milliGRAM(s) IV Push every 6 hours PRN      Vital Signs Last 24 Hrs  T(C): 36.3 (14 May 2020 06:30), Max: 36.5 (13 May 2020 09:16)  T(F): 97.4 (14 May 2020 06:30), Max: 97.7 (13 May 2020 09:16)  HR: 78 (14 May 2020 06:06) (77 - 90)  BP: 130/88 (14 May 2020 06:06) (122/74 - 154/71)  BP(mean): --  RR: 18 (14 May 2020 06:06) (18 - 18)  SpO2: 97% (14 May 2020 06:06) (95% - 98%)      PHYSICAL EXAM  General: NAD  HEENT: no oral ulcer or erythema  CV: (+) S1/S2 RRR  Lungs: clear to auscultation, no wheezes or rales  Abdomen: soft, non-tender, non-distended (+) BS  Ext: no edema  Skin: no rash  Neuro: alert and oriented X 3  PIV: C/D/I         LABS:                        7.3    2.12  )-----------( 40       ( 14 May 2020 07:07 )             21.2         Mean Cell Volume : 84.8 fl  Mean Cell Hemoglobin : 29.2 pg  Mean Cell Hemoglobin Concentration : 34.4 gm/dL  Auto Neutrophil # : x  Auto Lymphocyte # : x  Auto Monocyte # : x  Auto Eosinophil # : x  Auto Basophil # : x  Auto Neutrophil % : x  Auto Lymphocyte % : x  Auto Monocyte % : x  Auto Eosinophil % : x  Auto Basophil % : x      05-14    138  |  103  |  21  ----------------------------<  98  4.0   |  23  |  0.91    Ca    9.0      14 May 2020 07:07  Phos  4.7     05-14  Mg     1.4     05-14    TPro  6.0  /  Alb  3.4  /  TBili  1.6<H>  /  DBili  0.6<H>  /  AST  30  /  ALT  145<H>  /  AlkPhos  82  05-14      Mg 1.4  Phos 4.7      PT/INR - ( 14 May 2020 07:07 )   PT: 10.8 sec;   INR: 0.95 ratio         PTT - ( 14 May 2020 07:07 )  PTT:24.8 sec      Uric Acid 2.7    COVID-19 PCR . (04.29.20 @ 11:32)    COVID-19 PCR: NotDetec      RADIOLOGY & ADDITIONAL STUDIES:    < from: US Abdomen Upper Quadrant Right (05.04.20 @ 09:45) >  IMPRESSION:   Normal right upper quadrant abdominal ultrasound. Diagnosis: Aplastic Anemia     Protocol/Chemo Regimen: ATG/Cyclosporine/Prednisone (Previously received Solumedrol Days 1-4) Promacta on hold as of today for Grade 3 transaminitis    Day: 22    Pt endorsed: No acute complaints; stable haziness right eye     Review of Systems:  Patient denies  chest pain, palpitations, SOB, abdominal pain, nausea, vomiting or diarrhea     Pain scale: Denies     Diet: Regular, no concentrated phos     Allergies: No Known Allergies      ANTIMICROBIALS  acyclovir   Oral Tab/Cap 400 milliGRAM(s) Oral every 8 hours  atovaquone Suspension 750 milliGRAM(s) Oral every 12 hours  caspofungin IVPB      caspofungin IVPB 50 milliGRAM(s) IV Intermittent every 24 hours  levoFLOXacin  Tablet 500 milliGRAM(s) Oral every 24 hours      HEME/ONC MEDICATIONS  eltrombopag 150 milliGRAM(s) Oral daily      STANDING MEDICATIONS  antithymocyte globulin equine Skin Test 0.1 milliLiter(s) IntraDermal once  Biotene Dry Mouth Oral Rinse 5 milliLiter(s) Swish and Spit five times a day  cycloSPORINE  (SandIMMUNE) 500 milliGRAM(s) Oral every 12 hours  diphenhydrAMINE   Injectable 25 milliGRAM(s) IV Push once  famotidine    Tablet 20 milliGRAM(s) Oral two times a day  hydrocortisone sodium succinate Injectable 50 milliGRAM(s) IV Push once  sodium chloride 0.9%. 1000 milliLiter(s) IV Continuous <Continuous>  sucralfate suspension 1 Gram(s) Oral every 6 hours      PRN MEDICATIONS  acetaminophen   Tablet .. 650 milliGRAM(s) Oral every 6 hours PRN  aluminum hydroxide/magnesium hydroxide/simethicone Suspension 30 milliLiter(s) Oral every 4 hours PRN  diphenhydrAMINE   Injectable 25 milliGRAM(s) IV Push every 12 hours PRN  EPINEPHrine     1 mG/mL Injectable 0.3 milliGRAM(s) IntraMuscular once PRN  hydrocortisone sodium succinate Injectable 50 milliGRAM(s) IV Push every 12 hours PRN  methylPREDNISolone sodium succinate IVPB 500 milliGRAM(s) IV Intermittent once PRN  ondansetron Injectable 4 milliGRAM(s) IV Push every 6 hours PRN      Vital Signs Last 24 Hrs  T(C): 36.3 (14 May 2020 06:30), Max: 36.5 (13 May 2020 09:16)  T(F): 97.4 (14 May 2020 06:30), Max: 97.7 (13 May 2020 09:16)  HR: 78 (14 May 2020 06:06) (77 - 90)  BP: 130/88 (14 May 2020 06:06) (122/74 - 154/71)  BP(mean): --  RR: 18 (14 May 2020 06:06) (18 - 18)  SpO2: 97% (14 May 2020 06:06) (95% - 98%)      PHYSICAL EXAM  General: NAD  HEENT: no oral ulcer or erythema  CV: (+) S1/S2 RRR  Lungs: clear to auscultation, no wheezes or rales  Abdomen: soft, non-tender, non-distended (+) BS  Ext: no edema  Skin: no rash  Neuro: alert and oriented X 3  PIV: C/D/I       LABS:                        7.3    2.12  )-----------( 40       ( 14 May 2020 07:07 )             21.2         Mean Cell Volume : 84.8 fl  Mean Cell Hemoglobin : 29.2 pg  Mean Cell Hemoglobin Concentration : 34.4 gm/dL  Auto Neutrophil # : 0.12 K/uL  Auto Lymphocyte # : 1.89 K/uL  Auto Monocyte # : 0.11 K/uL  Auto Eosinophil # : 0.00 K/uL  Auto Basophil # : 0.00 K/uL  Auto Neutrophil % : 5.6 %  Auto Lymphocyte % : 89.2 %  Auto Monocyte % : 5.2 %  Auto Eosinophil % : 0.0 %  Auto Basophil % : 0.0 %      05-14    138  |  103  |  21  ----------------------------<  98  4.0   |  23  |  0.91    Ca    9.0      14 May 2020 07:07  Phos  4.7     05-14  Mg     1.4     05-14    TPro  6.0  /  Alb  3.4  /  TBili  1.6<H>  /  DBili  0.6<H>  /  AST  30  /  ALT  145<H>  /  AlkPhos  82  05-14        PT/INR - ( 14 May 2020 07:07 )   PT: 10.8 sec;   INR: 0.95 ratio         PTT - ( 14 May 2020 07:07 )  PTT:24.8 sec      Uric Acid 2.7        COVID-19 PCR . (04.29.20 @ 11:32)    COVID-19 PCR: NotDetec      RADIOLOGY & ADDITIONAL STUDIES:    < from: US Abdomen Upper Quadrant Right (05.04.20 @ 09:45) >  IMPRESSION:   Normal right upper quadrant abdominal ultrasound. Diagnosis: Aplastic Anemia     Protocol/Chemo Regimen: ATG/Cyclosporine/Prednisone (Previously received Solumedrol Days 1-4) Promacta on hold as of today for Grade 3 transaminitis    Day: 22    Pt endorsed: No acute complaints; stable haziness right eye     Review of Systems:  Patient denies  chest pain, palpitations, SOB, abdominal pain, nausea, vomiting or diarrhea     Pain scale: Denies     Diet: Regular, no concentrated phos     Allergies: No Known Allergies      ANTIMICROBIALS  acyclovir   Oral Tab/Cap 400 milliGRAM(s) Oral every 8 hours  atovaquone Suspension 750 milliGRAM(s) Oral every 12 hours  caspofungin IVPB      caspofungin IVPB 50 milliGRAM(s) IV Intermittent every 24 hours  levoFLOXacin  Tablet 500 milliGRAM(s) Oral every 24 hours      HEME/ONC MEDICATIONS  eltrombopag 150 milliGRAM(s) Oral daily      STANDING MEDICATIONS  antithymocyte globulin equine Skin Test 0.1 milliLiter(s) IntraDermal once  Biotene Dry Mouth Oral Rinse 5 milliLiter(s) Swish and Spit five times a day  cycloSPORINE  (SandIMMUNE) 500 milliGRAM(s) Oral every 12 hours  diphenhydrAMINE   Injectable 25 milliGRAM(s) IV Push once  famotidine    Tablet 20 milliGRAM(s) Oral two times a day  hydrocortisone sodium succinate Injectable 50 milliGRAM(s) IV Push once  sodium chloride 0.9%. 1000 milliLiter(s) IV Continuous <Continuous>  sucralfate suspension 1 Gram(s) Oral every 6 hours      PRN MEDICATIONS  acetaminophen   Tablet .. 650 milliGRAM(s) Oral every 6 hours PRN  aluminum hydroxide/magnesium hydroxide/simethicone Suspension 30 milliLiter(s) Oral every 4 hours PRN  diphenhydrAMINE   Injectable 25 milliGRAM(s) IV Push every 12 hours PRN  EPINEPHrine     1 mG/mL Injectable 0.3 milliGRAM(s) IntraMuscular once PRN  hydrocortisone sodium succinate Injectable 50 milliGRAM(s) IV Push every 12 hours PRN  methylPREDNISolone sodium succinate IVPB 500 milliGRAM(s) IV Intermittent once PRN  ondansetron Injectable 4 milliGRAM(s) IV Push every 6 hours PRN      Vital Signs Last 24 Hrs  T(C): 36.3 (14 May 2020 06:30), Max: 36.5 (13 May 2020 09:16)  T(F): 97.4 (14 May 2020 06:30), Max: 97.7 (13 May 2020 09:16)  HR: 78 (14 May 2020 06:06) (77 - 90)  BP: 130/88 (14 May 2020 06:06) (122/74 - 154/71)  BP(mean): --  RR: 18 (14 May 2020 06:06) (18 - 18)  SpO2: 97% (14 May 2020 06:06) (95% - 98%)      PHYSICAL EXAM  General: NAD  HEENT: no oral ulcer or erythema  CV: (+) S1/S2 RRR  Lungs: clear to auscultation, no wheezes or rales  Abdomen: soft, non-tender, non-distended (+) BS  Ext: no edema  Skin: no rash  Neuro: alert and oriented X 3  PIV: C/D/I       LABS:                        7.3    2.12  )-----------( 40       ( 14 May 2020 07:07 )             21.2         Mean Cell Volume : 84.8 fl  Mean Cell Hemoglobin : 29.2 pg  Mean Cell Hemoglobin Concentration : 34.4 gm/dL  Auto Neutrophil # : 0.12 K/uL  Auto Lymphocyte # : 1.89 K/uL  Auto Monocyte # : 0.11 K/uL  Auto Eosinophil # : 0.00 K/uL  Auto Basophil # : 0.00 K/uL  Auto Neutrophil % : 5.6 %  Auto Lymphocyte % : 89.2 %  Auto Monocyte % : 5.2 %  Auto Eosinophil % : 0.0 %  Auto Basophil % : 0.0 %      05-14    138  |  103  |  21  ----------------------------<  98  4.0   |  23  |  0.91    Ca    9.0      14 May 2020 07:07  Phos  4.7     05-14  Mg     1.4     05-14    TPro  6.0  /  Alb  3.4  /  TBili  1.6<H>  /  DBili  0.6<H>  /  AST  30  /  ALT  145<H>  /  AlkPhos  82  05-14        PT/INR - ( 14 May 2020 07:07 )   PT: 10.8 sec;   INR: 0.95 ratio         PTT - ( 14 May 2020 07:07 )  PTT:24.8 sec      Uric Acid 2.7    Cyclosporine Level (05.13.20 @ 08:10)    Cyclosporine Level: 420      COVID-19 PCR . (04.29.20 @ 11:32)    COVID-19 PCR: NotDetec      RADIOLOGY & ADDITIONAL STUDIES:    < from: US Abdomen Upper Quadrant Right (05.04.20 @ 09:45) >  IMPRESSION:   Normal right upper quadrant abdominal ultrasound.

## 2020-05-14 NOTE — PROGRESS NOTE ADULT - PROBLEM SELECTOR PLAN 3
Grade 3 transaminitis with hyperbilirubinemia, improving   Posaconazole changed to caspofungin  5/1 Promacta placed on hold. Once transaminitis is Grade 2 or less can consider restarting  5/4 US Abdomen unremarkable  Promacta resumed on 5/12 and cyclosporin resumed on 5/13, decreased to 500 mg q12h  check cyclosporin level on 5/15

## 2020-05-15 LAB
ALBUMIN SERPL ELPH-MCNC: 3.5 G/DL — SIGNIFICANT CHANGE UP (ref 3.3–5)
ALP SERPL-CCNC: 89 U/L — SIGNIFICANT CHANGE UP (ref 40–120)
ALT FLD-CCNC: 134 U/L — HIGH (ref 10–45)
ANION GAP SERPL CALC-SCNC: 14 MMOL/L — SIGNIFICANT CHANGE UP (ref 5–17)
AST SERPL-CCNC: 43 U/L — HIGH (ref 10–40)
BASOPHILS # BLD AUTO: 0 K/UL — SIGNIFICANT CHANGE UP (ref 0–0.2)
BASOPHILS NFR BLD AUTO: 0 % — SIGNIFICANT CHANGE UP (ref 0–2)
BILIRUB DIRECT SERPL-MCNC: 0.6 MG/DL — HIGH (ref 0–0.2)
BILIRUB SERPL-MCNC: 1.8 MG/DL — HIGH (ref 0.2–1.2)
BUN SERPL-MCNC: 16 MG/DL — SIGNIFICANT CHANGE UP (ref 7–23)
CALCIUM SERPL-MCNC: 9.2 MG/DL — SIGNIFICANT CHANGE UP (ref 8.4–10.5)
CHLORIDE SERPL-SCNC: 102 MMOL/L — SIGNIFICANT CHANGE UP (ref 96–108)
CO2 SERPL-SCNC: 22 MMOL/L — SIGNIFICANT CHANGE UP (ref 22–31)
CREAT SERPL-MCNC: 0.81 MG/DL — SIGNIFICANT CHANGE UP (ref 0.5–1.3)
CYCLOSPORINE SER-MCNC: 342 NG/ML — SIGNIFICANT CHANGE UP (ref 150–400)
EOSINOPHIL # BLD AUTO: 0 K/UL — SIGNIFICANT CHANGE UP (ref 0–0.5)
EOSINOPHIL NFR BLD AUTO: 0 % — SIGNIFICANT CHANGE UP (ref 0–6)
GLUCOSE SERPL-MCNC: 110 MG/DL — HIGH (ref 70–99)
HCT VFR BLD CALC: 20.8 % — CRITICAL LOW (ref 39–50)
HGB BLD-MCNC: 7.1 G/DL — LOW (ref 13–17)
LDH SERPL L TO P-CCNC: 203 U/L — SIGNIFICANT CHANGE UP (ref 50–242)
LYMPHOCYTES # BLD AUTO: 1.79 K/UL — SIGNIFICANT CHANGE UP (ref 1–3.3)
LYMPHOCYTES # BLD AUTO: 94.7 % — HIGH (ref 13–44)
MAGNESIUM SERPL-MCNC: 1.6 MG/DL — SIGNIFICANT CHANGE UP (ref 1.6–2.6)
MCHC RBC-ENTMCNC: 29.1 PG — SIGNIFICANT CHANGE UP (ref 27–34)
MCHC RBC-ENTMCNC: 34.1 GM/DL — SIGNIFICANT CHANGE UP (ref 32–36)
MCV RBC AUTO: 85.2 FL — SIGNIFICANT CHANGE UP (ref 80–100)
MONOCYTES # BLD AUTO: 0.03 K/UL — SIGNIFICANT CHANGE UP (ref 0–0.9)
MONOCYTES NFR BLD AUTO: 1.8 % — LOW (ref 2–14)
NEUTROPHILS # BLD AUTO: 0.07 K/UL — LOW (ref 1.8–7.4)
NEUTROPHILS NFR BLD AUTO: 3.5 % — LOW (ref 43–77)
PHOSPHATE SERPL-MCNC: 4.3 MG/DL — SIGNIFICANT CHANGE UP (ref 2.5–4.5)
PLATELET # BLD AUTO: 67 K/UL — LOW (ref 150–400)
POTASSIUM SERPL-MCNC: 3.5 MMOL/L — SIGNIFICANT CHANGE UP (ref 3.5–5.3)
POTASSIUM SERPL-SCNC: 3.5 MMOL/L — SIGNIFICANT CHANGE UP (ref 3.5–5.3)
PROT SERPL-MCNC: 6.2 G/DL — SIGNIFICANT CHANGE UP (ref 6–8.3)
RBC # BLD: 2.44 M/UL — LOW (ref 4.2–5.8)
RBC # FLD: 13.7 % — SIGNIFICANT CHANGE UP (ref 10.3–14.5)
SODIUM SERPL-SCNC: 138 MMOL/L — SIGNIFICANT CHANGE UP (ref 135–145)
URATE SERPL-MCNC: 2.9 MG/DL — LOW (ref 3.4–8.8)
WBC # BLD: 1.89 K/UL — LOW (ref 3.8–10.5)
WBC # FLD AUTO: 1.89 K/UL — LOW (ref 3.8–10.5)

## 2020-05-15 PROCEDURE — 99232 SBSQ HOSP IP/OBS MODERATE 35: CPT | Mod: GC

## 2020-05-15 RX ADMIN — Medication 400 MILLIGRAM(S): at 22:20

## 2020-05-15 RX ADMIN — ATOVAQUONE 750 MILLIGRAM(S): 750 SUSPENSION ORAL at 17:24

## 2020-05-15 RX ADMIN — Medication 1 GRAM(S): at 17:24

## 2020-05-15 RX ADMIN — Medication 10 MILLIGRAM(S): at 05:05

## 2020-05-15 RX ADMIN — CASPOFUNGIN ACETATE 260 MILLIGRAM(S): 7 INJECTION, POWDER, LYOPHILIZED, FOR SOLUTION INTRAVENOUS at 07:51

## 2020-05-15 RX ADMIN — Medication 400 MILLIGRAM(S): at 05:04

## 2020-05-15 RX ADMIN — CYCLOSPORINE 500 MILLIGRAM(S): 100 CAPSULE ORAL at 17:24

## 2020-05-15 RX ADMIN — Medication 5 MILLILITER(S): at 00:29

## 2020-05-15 RX ADMIN — Medication 5 MILLILITER(S): at 11:27

## 2020-05-15 RX ADMIN — FAMOTIDINE 20 MILLIGRAM(S): 10 INJECTION INTRAVENOUS at 05:04

## 2020-05-15 RX ADMIN — Medication 1 GRAM(S): at 05:04

## 2020-05-15 RX ADMIN — Medication 400 MILLIGRAM(S): at 13:36

## 2020-05-15 RX ADMIN — Medication 5 MILLILITER(S): at 22:21

## 2020-05-15 RX ADMIN — ELTROMBOPAG OLAMINE 150 MILLIGRAM(S): 50 TABLET, FILM COATED ORAL at 11:25

## 2020-05-15 RX ADMIN — FAMOTIDINE 20 MILLIGRAM(S): 10 INJECTION INTRAVENOUS at 17:24

## 2020-05-15 RX ADMIN — Medication 30 MILLILITER(S): at 22:27

## 2020-05-15 RX ADMIN — ATOVAQUONE 750 MILLIGRAM(S): 750 SUSPENSION ORAL at 05:04

## 2020-05-15 RX ADMIN — Medication 1 GRAM(S): at 23:31

## 2020-05-15 RX ADMIN — CYCLOSPORINE 500 MILLIGRAM(S): 100 CAPSULE ORAL at 05:05

## 2020-05-15 RX ADMIN — Medication 1 GRAM(S): at 11:25

## 2020-05-15 RX ADMIN — Medication 5 MILLILITER(S): at 17:23

## 2020-05-15 NOTE — PROGRESS NOTE ADULT - ATTENDING COMMENTS
33yo M with no PMH presented to OSH with dizziness x 2 weeks, easy bruising x 2-4 weeks. At home had a syncopal event and called 911, found to have hemoglobin 3's, Platelets 2K, no evidence of DIC. Diagnosed with AA on BM bx 4/20/20 -started horse ATG/Cyclosporine/Eltrombopag day +22    -receiving transfusions if Hgb< 7, Plt <50k   -optho consult given hazy vision, suspect retinal bleed given low platelets. Platelet transfusional support for goal >50k.   -Paranasal sinusitis on CT head(4/17)- cont Levaquin, begin Posaconazole; on Mepron and Acyclovir with immunosuppressive therapy.  -LFTs grade 3 - switched posaconazole to caspofungin on 4/29 and when still uptrending subsequently held promacta on 5/1. Ordered abdominal sono on 5/3 - normal.  LFTs improving, now < grade 2. Restart Promacta on 5/12  -abdominal discomfort, likely gastritis -improved, on Pepcid and Carafate  -Cyclosporine level monitored q2-3 days, adjust level to keep therapeutic  -OOB/ambulate 33yo M with no PMH presented to OSH with dizziness x 2 weeks, easy bruising x 2-4 weeks. At home had a syncopal event and called 911, found to have hemoglobin 3's, Platelets 2K, no evidence of DIC. Diagnosed with AA on BM bx 4/20/20 -started horse ATG/Cyclosporine/Eltrombopag day +23    -receiving transfusions if Hgb< 7, Plt <50k   -optho consult given hazy vision, suspect retinal bleed given low platelets. Platelet transfusional support for goal >50k.   -Paranasal sinusitis on CT head(4/17)- cont Levaquin, begin Posaconazole; on Mepron and Acyclovir with immunosuppressive therapy.  -LFTs grade 3 - switched posaconazole to caspofungin on 4/29 and when still uptrending subsequently held promacta on 5/1. Ordered abdominal sono on 5/3 - normal.  LFTs improving, now < grade 2. Restart Promacta on 5/12  -abdominal discomfort, likely gastritis -improved, on Pepcid and Carafate  -Cyclosporine level monitored q2-3 days, adjust level to keep therapeutic, today is 342  -OOB/ambulate

## 2020-05-15 NOTE — PROGRESS NOTE ADULT - PROBLEM SELECTOR PLAN 2
The patient is neutropenic, afebrile  If febrile Pan Cx, CXR and change Levaquin to Cefepime  Continue Levaquin, Mepron, Caspofungin and Acyclovir for prophylaxis  COVID-19 surveillance (-) x 3( 4/22, 4/29 & 5/14 The patient is neutropenic, afebrile  If febrile Pan Cx, CXR and change Levaquin to Cefepime  Continue Levaquin, Mepron, Caspofungin and Acyclovir for prophylaxis  COVID-19 surveillance (-) x 3( 4/22, 4/29 & 5/14)

## 2020-05-15 NOTE — PROGRESS NOTE ADULT - SUBJECTIVE AND OBJECTIVE BOX
Diagnosis: Aplastic Anemia     Protocol/Chemo Regimen: ATG/Cyclosporine/Prednisone (Previously received Solumedrol Days 1-4) Promacta on hold as of today for Grade 3 transaminitis    Day: 23    Pt endorsed: No acute complaints; stable haziness right eye     Review of Systems:  Patient denies  chest pain, palpitations, SOB, abdominal pain, nausea, vomiting or diarrhea     Pain scale: Denies     Diet: Regular, no concentrated phos     Allergies: No Known Allergies      ANTIMICROBIALS  acyclovir   Oral Tab/Cap 400 milliGRAM(s) Oral every 8 hours  atovaquone Suspension 750 milliGRAM(s) Oral every 12 hours  caspofungin IVPB      caspofungin IVPB 50 milliGRAM(s) IV Intermittent every 24 hours  levoFLOXacin  Tablet 500 milliGRAM(s) Oral every 24 hours      HEME/ONC MEDICATIONS  eltrombopag 150 milliGRAM(s) Oral daily      STANDING MEDICATIONS  antithymocyte globulin equine Skin Test 0.1 milliLiter(s) IntraDermal once  Biotene Dry Mouth Oral Rinse 5 milliLiter(s) Swish and Spit five times a day  cycloSPORINE  (SandIMMUNE) 500 milliGRAM(s) Oral every 12 hours  diphenhydrAMINE   Injectable 25 milliGRAM(s) IV Push once  famotidine    Tablet 20 milliGRAM(s) Oral two times a day  hydrocortisone sodium succinate Injectable 50 milliGRAM(s) IV Push once  predniSONE   Tablet 10 milliGRAM(s) Oral daily  sodium chloride 0.9%. 1000 milliLiter(s) IV Continuous <Continuous>  sucralfate suspension 1 Gram(s) Oral every 6 hours      PRN MEDICATIONS  acetaminophen   Tablet .. 650 milliGRAM(s) Oral every 6 hours PRN  aluminum hydroxide/magnesium hydroxide/simethicone Suspension 30 milliLiter(s) Oral every 4 hours PRN  diphenhydrAMINE   Injectable 25 milliGRAM(s) IV Push every 12 hours PRN  EPINEPHrine     1 mG/mL Injectable 0.3 milliGRAM(s) IntraMuscular once PRN  hydrocortisone sodium succinate Injectable 50 milliGRAM(s) IV Push every 12 hours PRN  methylPREDNISolone sodium succinate IVPB 500 milliGRAM(s) IV Intermittent once PRN  ondansetron Injectable 4 milliGRAM(s) IV Push every 6 hours PRN      Vital Signs Last 24 Hrs  T(C): 36.1 (15 May 2020 04:51), Max: 36.8 (14 May 2020 17:00)  T(F): 96.9 (15 May 2020 04:51), Max: 98.2 (14 May 2020 17:00)  HR: 88 (15 May 2020 04:51) (73 - 98)  BP: 127/72 (15 May 2020 04:51) (125/83 - 146/97)  BP(mean): --  RR: 18 (15 May 2020 04:51) (18 - 20)  SpO2: 98% (15 May 2020 04:51) (98% - 98%)      PHYSICAL EXAM  General: NAD  HEENT: no oral ulcer or erythema  CV: (+) S1/S2 RRR  Lungs: clear to auscultation, no wheezes or rales  Abdomen: soft, non-tender, non-distended (+) BS  Ext: no edema  Skin: no rash  Neuro: alert and oriented X 3  PIV: C/D/I       LABS:                        7.3    2.12  )-----------( 40       ( 14 May 2020 07:07 )             21.2         Mean Cell Volume : 84.8 fl  Mean Cell Hemoglobin : 29.2 pg  Mean Cell Hemoglobin Concentration : 34.4 gm/dL  Auto Neutrophil # : 0.12 K/uL  Auto Lymphocyte # : 1.89 K/uL  Auto Monocyte # : 0.11 K/uL  Auto Eosinophil # : 0.00 K/uL  Auto Basophil # : 0.00 K/uL  Auto Neutrophil % : 5.6 %  Auto Lymphocyte % : 89.2 %  Auto Monocyte % : 5.2 %  Auto Eosinophil % : 0.0 %  Auto Basophil % : 0.0 %      05-14    138  |  103  |  21  ----------------------------<  98  4.0   |  23  |  0.91    Ca    9.0      14 May 2020 07:07  Phos  4.7     05-14  Mg     1.4     05-14    TPro  6.0  /  Alb  3.4  /  TBili  1.6<H>  /  DBili  0.6<H>  /  AST  30  /  ALT  145<H>  /  AlkPhos  82  05-14          PT/INR - ( 14 May 2020 07:07 )   PT: 10.8 sec;   INR: 0.95 ratio         PTT - ( 14 May 2020 07:07 )  PTT:24.8 sec        Cyclosporine Level (05.13.20 @ 08:10)    Cyclosporine Level: 420      RECENT CULTURES:    COVID-19 PCR . (05.14.20 @ 14:35)    COVID-19 PCR: NotDetec    RADIOLOGY & ADDITIONAL STUDIES:      < from: US Abdomen Upper Quadrant Right (05.04.20 @ 09:45) >  IMPRESSION:   Normal right upper quadrant abdominal ultrasound. Diagnosis: Aplastic Anemia     Protocol/Chemo Regimen: ATG/Cyclosporine/Prednisone (Previously received Solumedrol Days 1-4) Promacta on hold as of today for Grade 3 transaminitis    Day: 23    Pt endorsed: No acute complaints; stable haziness right eye     Review of Systems:  Patient denies  chest pain, palpitations, SOB, abdominal pain, nausea, vomiting or diarrhea     Pain scale: Denies     Diet: Regular, no concentrated phos     Allergies: No Known Allergies      ANTIMICROBIALS  acyclovir   Oral Tab/Cap 400 milliGRAM(s) Oral every 8 hours  atovaquone Suspension 750 milliGRAM(s) Oral every 12 hours  caspofungin IVPB      caspofungin IVPB 50 milliGRAM(s) IV Intermittent every 24 hours  levoFLOXacin  Tablet 500 milliGRAM(s) Oral every 24 hours      HEME/ONC MEDICATIONS  eltrombopag 150 milliGRAM(s) Oral daily      STANDING MEDICATIONS  antithymocyte globulin equine Skin Test 0.1 milliLiter(s) IntraDermal once  Biotene Dry Mouth Oral Rinse 5 milliLiter(s) Swish and Spit five times a day  cycloSPORINE  (SandIMMUNE) 500 milliGRAM(s) Oral every 12 hours  diphenhydrAMINE   Injectable 25 milliGRAM(s) IV Push once  famotidine    Tablet 20 milliGRAM(s) Oral two times a day  hydrocortisone sodium succinate Injectable 50 milliGRAM(s) IV Push once  predniSONE   Tablet 10 milliGRAM(s) Oral daily  sodium chloride 0.9%. 1000 milliLiter(s) IV Continuous <Continuous>  sucralfate suspension 1 Gram(s) Oral every 6 hours      PRN MEDICATIONS  acetaminophen   Tablet .. 650 milliGRAM(s) Oral every 6 hours PRN  aluminum hydroxide/magnesium hydroxide/simethicone Suspension 30 milliLiter(s) Oral every 4 hours PRN  diphenhydrAMINE   Injectable 25 milliGRAM(s) IV Push every 12 hours PRN  EPINEPHrine     1 mG/mL Injectable 0.3 milliGRAM(s) IntraMuscular once PRN  hydrocortisone sodium succinate Injectable 50 milliGRAM(s) IV Push every 12 hours PRN  methylPREDNISolone sodium succinate IVPB 500 milliGRAM(s) IV Intermittent once PRN  ondansetron Injectable 4 milliGRAM(s) IV Push every 6 hours PRN      Vital Signs Last 24 Hrs  T(C): 36.1 (15 May 2020 04:51), Max: 36.8 (14 May 2020 17:00)  T(F): 96.9 (15 May 2020 04:51), Max: 98.2 (14 May 2020 17:00)  HR: 88 (15 May 2020 04:51) (73 - 98)  BP: 127/72 (15 May 2020 04:51) (125/83 - 146/97)  BP(mean): --  RR: 18 (15 May 2020 04:51) (18 - 20)  SpO2: 98% (15 May 2020 04:51) (98% - 98%)      PHYSICAL EXAM  General: NAD  HEENT: no oral ulcer or erythema  CV: (+) S1/S2 RRR  Lungs: clear to auscultation, no wheezes or rales  Abdomen: soft, non-tender, non-distended (+) BS  Ext: no edema  Skin: no rash  Neuro: alert and oriented X 3  PIV: C/D/I       LABS:                        7.1    1.89  )-----------( 67       ( 15 May 2020 06:58 )             20.8         Mean Cell Volume : 85.2 fl  Mean Cell Hemoglobin : 29.1 pg  Mean Cell Hemoglobin Concentration : 34.1 gm/dL  Auto Neutrophil # : 0.07 K/uL  Auto Lymphocyte # : 1.79 K/uL  Auto Monocyte # : 0.03 K/uL  Auto Eosinophil # : 0.00 K/uL  Auto Basophil # : 0.00 K/uL  Auto Neutrophil % : 3.5 %  Auto Lymphocyte % : 94.7 %  Auto Monocyte % : 1.8 %  Auto Eosinophil % : 0.0 %  Auto Basophil % : 0.0 %      05-15    138  |  102  |  16  ----------------------------<  110<H>  3.5   |  22  |  0.81    Ca    9.2      15 May 2020 06:58  Phos  4.3     05-15  Mg     1.6     05-15    TPro  6.2  /  Alb  3.5  /  TBili  1.8<H>  /  DBili  0.6<H>  /  AST  43<H>  /  ALT  134<H>  /  AlkPhos  89  05-15      PT/INR - ( 14 May 2020 07:07 )   PT: 10.8 sec;   INR: 0.95 ratio         PTT - ( 14 May 2020 07:07 )  PTT:24.8 sec      Uric Acid 2.9    Cyclosporine Level (05.15.20 @ 08:24)    Cyclosporine Level: 342    RECENT CULTURES:    COVID-19 PCR . (05.14.20 @ 14:35)    COVID-19 PCR: NotDetec    RADIOLOGY & ADDITIONAL STUDIES:      < from: US Abdomen Upper Quadrant Right (05.04.20 @ 09:45) >  IMPRESSION:   Normal right upper quadrant abdominal ultrasound.

## 2020-05-15 NOTE — PROGRESS NOTE ADULT - PROBLEM SELECTOR PLAN 1
4/20 BM Bx consistent with Aplastic Anemia  Status post Horse ATG 40mg/kg/day daily x 4 days (4/23- 4/26)  Continue Prednisone (taper to continue through 5/17)   5/13 CSA level = 420, CSA dose increased to 500 mg twice daily.   Promacta previously held for transaminitis, resumed on 5/12   Monitor CBC/Lytes and transfuse/replete PRN  Keep PLT> 50k for retinal hemorrhage  No concentrated phos diet due to mild hyperphosphatemia  Strict Is and Os/Daily weights/Mouth Care 4/20 BM Bx consistent with Aplastic Anemia  Status post Horse ATG 40mg/kg/day daily x 4 days (4/23- 4/26)  Continue Prednisone (taper to continue through 5/17)   5/13 CSA level = 420, CSA dose decreased to 500 mg twice daily,  today.   Recheck CSA level on 5/18  Promacta previously held for transaminitis, resumed on 5/12   Monitor CBC/Lytes and transfuse/replete PRN  Keep PLT> 50k for retinal hemorrhage  No concentrated phos diet due to mild hyperphosphatemia  Strict Is and Os/Daily weights/Mouth Care

## 2020-05-15 NOTE — PROGRESS NOTE ADULT - PROBLEM SELECTOR PLAN 3
Grade 3 transaminitis with hyperbilirubinemia, improving   Posaconazole changed to caspofungin  5/1 Promacta placed on hold. Once transaminitis is Grade 2 or less can consider restarting  5/4 US Abdomen unremarkable  Promacta resumed on 5/12 and cyclosporin resumed on 5/13, decreased to 500 mg q12h  check cyclosporin level on 5/15 Grade 3 transaminitis with hyperbilirubinemia, improving   Posaconazole changed to caspofungin  5/1 Promacta placed on hold  5/4 US Abdomen unremarkable  Promacta resumed on 5/12

## 2020-05-16 LAB
ALBUMIN SERPL ELPH-MCNC: 3.3 G/DL — SIGNIFICANT CHANGE UP (ref 3.3–5)
ALP SERPL-CCNC: 96 U/L — SIGNIFICANT CHANGE UP (ref 40–120)
ALT FLD-CCNC: 190 U/L — HIGH (ref 10–45)
ANION GAP SERPL CALC-SCNC: 10 MMOL/L — SIGNIFICANT CHANGE UP (ref 5–17)
AST SERPL-CCNC: 89 U/L — HIGH (ref 10–40)
BASOPHILS # BLD AUTO: 0 K/UL — SIGNIFICANT CHANGE UP (ref 0–0.2)
BASOPHILS NFR BLD AUTO: 0 % — SIGNIFICANT CHANGE UP (ref 0–2)
BILIRUB DIRECT SERPL-MCNC: 1.1 MG/DL — HIGH (ref 0–0.2)
BILIRUB SERPL-MCNC: 2.2 MG/DL — HIGH (ref 0.2–1.2)
BUN SERPL-MCNC: 18 MG/DL — SIGNIFICANT CHANGE UP (ref 7–23)
CALCIUM SERPL-MCNC: 8.8 MG/DL — SIGNIFICANT CHANGE UP (ref 8.4–10.5)
CHLORIDE SERPL-SCNC: 102 MMOL/L — SIGNIFICANT CHANGE UP (ref 96–108)
CO2 SERPL-SCNC: 25 MMOL/L — SIGNIFICANT CHANGE UP (ref 22–31)
CREAT SERPL-MCNC: 0.87 MG/DL — SIGNIFICANT CHANGE UP (ref 0.5–1.3)
EOSINOPHIL # BLD AUTO: 0 K/UL — SIGNIFICANT CHANGE UP (ref 0–0.5)
EOSINOPHIL NFR BLD AUTO: 0 % — SIGNIFICANT CHANGE UP (ref 0–6)
GLUCOSE SERPL-MCNC: 96 MG/DL — SIGNIFICANT CHANGE UP (ref 70–99)
HCT VFR BLD CALC: 20.3 % — CRITICAL LOW (ref 39–50)
HGB BLD-MCNC: 7 G/DL — CRITICAL LOW (ref 13–17)
LDH SERPL L TO P-CCNC: 195 U/L — SIGNIFICANT CHANGE UP (ref 50–242)
LYMPHOCYTES # BLD AUTO: 1.7 K/UL — SIGNIFICANT CHANGE UP (ref 1–3.3)
LYMPHOCYTES # BLD AUTO: 94.7 % — HIGH (ref 13–44)
MAGNESIUM SERPL-MCNC: 1.4 MG/DL — LOW (ref 1.6–2.6)
MCHC RBC-ENTMCNC: 29.5 PG — SIGNIFICANT CHANGE UP (ref 27–34)
MCHC RBC-ENTMCNC: 34.5 GM/DL — SIGNIFICANT CHANGE UP (ref 32–36)
MCV RBC AUTO: 85.7 FL — SIGNIFICANT CHANGE UP (ref 80–100)
MONOCYTES # BLD AUTO: 0.03 K/UL — SIGNIFICANT CHANGE UP (ref 0–0.9)
MONOCYTES NFR BLD AUTO: 1.8 % — LOW (ref 2–14)
NEUTROPHILS # BLD AUTO: 0.06 K/UL — LOW (ref 1.8–7.4)
NEUTROPHILS NFR BLD AUTO: 3.5 % — LOW (ref 43–77)
PHOSPHATE SERPL-MCNC: 4.8 MG/DL — HIGH (ref 2.5–4.5)
PLATELET # BLD AUTO: 46 K/UL — LOW (ref 150–400)
POTASSIUM SERPL-MCNC: 4.1 MMOL/L — SIGNIFICANT CHANGE UP (ref 3.5–5.3)
POTASSIUM SERPL-SCNC: 4.1 MMOL/L — SIGNIFICANT CHANGE UP (ref 3.5–5.3)
PROT SERPL-MCNC: 6 G/DL — SIGNIFICANT CHANGE UP (ref 6–8.3)
RBC # BLD: 2.37 M/UL — LOW (ref 4.2–5.8)
RBC # FLD: 13.6 % — SIGNIFICANT CHANGE UP (ref 10.3–14.5)
SODIUM SERPL-SCNC: 137 MMOL/L — SIGNIFICANT CHANGE UP (ref 135–145)
URATE SERPL-MCNC: 2.6 MG/DL — LOW (ref 3.4–8.8)
WBC # BLD: 1.8 K/UL — LOW (ref 3.8–10.5)
WBC # FLD AUTO: 1.8 K/UL — LOW (ref 3.8–10.5)

## 2020-05-16 PROCEDURE — 86078 PHYS BLOOD BANK SERV REACTJ: CPT

## 2020-05-16 PROCEDURE — 71045 X-RAY EXAM CHEST 1 VIEW: CPT | Mod: 26

## 2020-05-16 PROCEDURE — 99232 SBSQ HOSP IP/OBS MODERATE 35: CPT | Mod: GC

## 2020-05-16 PROCEDURE — 93010 ELECTROCARDIOGRAM REPORT: CPT

## 2020-05-16 RX ORDER — MAGNESIUM SULFATE 500 MG/ML
2 VIAL (ML) INJECTION ONCE
Refills: 0 | Status: COMPLETED | OUTPATIENT
Start: 2020-05-16 | End: 2020-05-16

## 2020-05-16 RX ORDER — MORPHINE SULFATE 50 MG/1
2 CAPSULE, EXTENDED RELEASE ORAL ONCE
Refills: 0 | Status: DISCONTINUED | OUTPATIENT
Start: 2020-05-16 | End: 2020-05-16

## 2020-05-16 RX ORDER — IPRATROPIUM/ALBUTEROL SULFATE 18-103MCG
3 AEROSOL WITH ADAPTER (GRAM) INHALATION ONCE
Refills: 0 | Status: COMPLETED | OUTPATIENT
Start: 2020-05-16 | End: 2020-05-16

## 2020-05-16 RX ADMIN — Medication 25 MILLIGRAM(S): at 12:05

## 2020-05-16 RX ADMIN — Medication 400 MILLIGRAM(S): at 05:47

## 2020-05-16 RX ADMIN — CYCLOSPORINE 500 MILLIGRAM(S): 100 CAPSULE ORAL at 05:49

## 2020-05-16 RX ADMIN — Medication 5 MILLILITER(S): at 11:17

## 2020-05-16 RX ADMIN — Medication 3 MILLILITER(S): at 12:41

## 2020-05-16 RX ADMIN — Medication 5 MILLILITER(S): at 21:09

## 2020-05-16 RX ADMIN — Medication 50 GRAM(S): at 11:21

## 2020-05-16 RX ADMIN — Medication 30 MILLILITER(S): at 17:08

## 2020-05-16 RX ADMIN — Medication 1 GRAM(S): at 11:18

## 2020-05-16 RX ADMIN — FAMOTIDINE 20 MILLIGRAM(S): 10 INJECTION INTRAVENOUS at 05:47

## 2020-05-16 RX ADMIN — ATOVAQUONE 750 MILLIGRAM(S): 750 SUSPENSION ORAL at 17:05

## 2020-05-16 RX ADMIN — Medication 650 MILLIGRAM(S): at 10:36

## 2020-05-16 RX ADMIN — FAMOTIDINE 20 MILLIGRAM(S): 10 INJECTION INTRAVENOUS at 17:07

## 2020-05-16 RX ADMIN — ATOVAQUONE 750 MILLIGRAM(S): 750 SUSPENSION ORAL at 05:47

## 2020-05-16 RX ADMIN — CASPOFUNGIN ACETATE 260 MILLIGRAM(S): 7 INJECTION, POWDER, LYOPHILIZED, FOR SOLUTION INTRAVENOUS at 08:47

## 2020-05-16 RX ADMIN — Medication 1 GRAM(S): at 17:07

## 2020-05-16 RX ADMIN — Medication 5 MILLILITER(S): at 01:00

## 2020-05-16 RX ADMIN — Medication 400 MILLIGRAM(S): at 13:12

## 2020-05-16 RX ADMIN — Medication 5 MILLILITER(S): at 08:47

## 2020-05-16 RX ADMIN — Medication 5 MILLILITER(S): at 17:05

## 2020-05-16 RX ADMIN — CYCLOSPORINE 500 MILLIGRAM(S): 100 CAPSULE ORAL at 17:06

## 2020-05-16 RX ADMIN — Medication 400 MILLIGRAM(S): at 21:15

## 2020-05-16 RX ADMIN — ELTROMBOPAG OLAMINE 150 MILLIGRAM(S): 50 TABLET, FILM COATED ORAL at 11:18

## 2020-05-16 RX ADMIN — Medication 10 MILLIGRAM(S): at 05:47

## 2020-05-16 RX ADMIN — Medication 50 MILLIGRAM(S): at 10:35

## 2020-05-16 RX ADMIN — Medication 1 GRAM(S): at 05:47

## 2020-05-16 NOTE — PROGRESS NOTE ADULT - PROBLEM SELECTOR PLAN 1
4/20 BM Bx consistent with Aplastic Anemia  Status post Horse ATG 40mg/kg/day daily x 4 days (4/23- 4/26)  Continue Prednisone (taper to continue through 5/17)   5/13 CSA level = 420, CSA dose decreased to 500 mg twice daily,  today.   Recheck CSA level on 5/18  Promacta previously held for transaminitis, resumed on 5/12   Monitor CBC/Lytes and transfuse/replete PRN  Keep PLT> 50k for retinal hemorrhage  No concentrated phos diet due to mild hyperphosphatemia  Strict Is and Os/Daily weights/Mouth Care 4/20 BM Bx consistent with Aplastic Anemia  Status post Horse ATG 40mg/kg/day daily x 4 days (4/23- 4/26)  Continue Prednisone (taper to continue through 5/17)   5/13 CSA level = 420, CSA dose decreased to 500 mg twice daily,  today.   Recheck CSA level on 5/18  Promacta previously held for transaminitis, resumed on 5/12   Monitor CBC/Lytes and transfuse/replete PRN  Keep PLT> 50k for retinal hemorrhage  No concentrated phos diet due to mild hyperphosphatemia  Strict Is and Os/Daily weights/Mouth Care  - Thrombocytopenia: Platelets 1 bag today  - Hypomagnesemia: replace magnesium sulfate 2gm iv x 1 4/20 BM Bx consistent with Aplastic Anemia  Status post Horse ATG 40mg/kg/day daily x 4 days (4/23- 4/26)  Continue Prednisone (taper to continue through 5/17)   CSA dose decreased to 500 mg twice daily. Recheck CSA level on 5/18  Promacta previously held for transaminitis, resumed on 5/12   Monitor CBC/Lytes and transfuse/replete PRN  Keep PLT> 50k for retinal hemorrhage  No concentrated phos diet due to mild hyperphosphatemia  Strict Is and Os/Daily weights/Mouth Care  - Thrombocytopenia: Platelets 1 bag today  - Hypomagnesemia: replace magnesium sulfate 2gm iv x 1 4/20 BM Bx consistent with Aplastic Anemia  Status post Horse ATG 40mg/kg/day daily x 4 days (4/23- 4/26)  Continue Prednisone (taper to continue through 5/17)   CSA dose decreased to 500 mg twice daily. Recheck CSA level on 5/18  Promacta previously held for transaminitis, resumed on 5/12   Monitor CBC/Lytes and transfuse/replete PRN  Keep PLT> 50k for retinal hemorrhage  No concentrated phos diet due to mild hyperphosphatemia  Strict Is and Os/Daily weights/Mouth Care  - Thrombocytopenia: Platelets 1 bag today. Reaction during completion of transfusion with chest discomfort (pt initially refused benadyl pre-medication) and improved with administration of benadryl). EKG and CXR wnl   - Hypomagnesemia: replace magnesium sulfate 2gm iv x 1

## 2020-05-16 NOTE — PROGRESS NOTE ADULT - ATTENDING COMMENTS
35yo M with no PMH presented to OSH with dizziness x 2 weeks, easy bruising x 2-4 weeks. At home had a syncopal event and called 911, found to have hemoglobin 3's, Platelets 2K, no evidence of DIC. Diagnosed with AA on BM bx 4/20/20 -started horse ATG/Cyclosporine/Eltrombopag day +23    -receiving transfusions if Hgb< 7, Plt <50k   -optho consult given hazy vision, suspect retinal bleed given low platelets. Platelet transfusional support for goal >50k.   -Paranasal sinusitis on CT head(4/17)- cont Levaquin, begin Posaconazole; on Mepron and Acyclovir with immunosuppressive therapy.  -LFTs grade 3 - switched posaconazole to caspofungin on 4/29 and when still uptrending subsequently held promacta on 5/1. Ordered abdominal sono on 5/3 - normal.  LFTs improving, now < grade 2. Restart Promacta on 5/12  -abdominal discomfort, likely gastritis -improved, on Pepcid and Carafate  -Cyclosporine level monitored q2-3 days, adjust level to keep therapeutic, today is 342  -OOB/ambulate 35yo M with no PMH presented to OSH with dizziness x 2 weeks, easy bruising x 2-4 weeks. At home had a syncopal event and called 911, found to have hemoglobin 3's, Platelets 2K, no evidence of DIC. Diagnosed with AA on BM bx 4/20/20 -started horse ATG/Cyclosporine/Eltrombopag day +24    -receiving transfusions if Hgb< 7, Plt <50k   -optho consult given hazy vision, suspect retinal bleed given low platelets. Platelet transfusional support for goal >50k.   -Paranasal sinusitis on CT head(4/17)- cont Levaquin, begin Posaconazole; on Mepron and Acyclovir with immunosuppressive therapy.  -LFTs grade 3 - switched posaconazole to caspofungin on 4/29 and when still uptrending subsequently held promacta on 5/1. Ordered abdominal sono on 5/3 - normal.  LFTs improving, now < grade 2. Restarted Promacta on 5/12  -hx gastritis -improved, on Pepcid and Carafate  -Cyclosporine level monitored q2-3 days, adjust level to keep therapeutic, next due on 5/18  -OOB/ambulate

## 2020-05-16 NOTE — PROGRESS NOTE ADULT - PROBLEM SELECTOR PLAN 2
The patient is neutropenic, afebrile  If febrile Pan Cx, CXR and change Levaquin to Cefepime  Continue Levaquin, Mepron, Caspofungin and Acyclovir for prophylaxis  COVID-19 surveillance (-) x 3( 4/22, 4/29 & 5/14)

## 2020-05-17 LAB
ALBUMIN SERPL ELPH-MCNC: 3.6 G/DL — SIGNIFICANT CHANGE UP (ref 3.3–5)
ALP SERPL-CCNC: 112 U/L — SIGNIFICANT CHANGE UP (ref 40–120)
ALT FLD-CCNC: 198 U/L — HIGH (ref 10–45)
ANION GAP SERPL CALC-SCNC: 16 MMOL/L — SIGNIFICANT CHANGE UP (ref 5–17)
AST SERPL-CCNC: 71 U/L — HIGH (ref 10–40)
BASOPHILS # BLD AUTO: 0 K/UL — SIGNIFICANT CHANGE UP (ref 0–0.2)
BASOPHILS NFR BLD AUTO: 0 % — SIGNIFICANT CHANGE UP (ref 0–2)
BILIRUB DIRECT SERPL-MCNC: 1.1 MG/DL — HIGH (ref 0–0.2)
BILIRUB SERPL-MCNC: 2.5 MG/DL — HIGH (ref 0.2–1.2)
BUN SERPL-MCNC: 14 MG/DL — SIGNIFICANT CHANGE UP (ref 7–23)
CALCIUM SERPL-MCNC: 9.2 MG/DL — SIGNIFICANT CHANGE UP (ref 8.4–10.5)
CHLORIDE SERPL-SCNC: 101 MMOL/L — SIGNIFICANT CHANGE UP (ref 96–108)
CO2 SERPL-SCNC: 23 MMOL/L — SIGNIFICANT CHANGE UP (ref 22–31)
CREAT SERPL-MCNC: 0.87 MG/DL — SIGNIFICANT CHANGE UP (ref 0.5–1.3)
EOSINOPHIL # BLD AUTO: 0 K/UL — SIGNIFICANT CHANGE UP (ref 0–0.5)
EOSINOPHIL NFR BLD AUTO: 0 % — SIGNIFICANT CHANGE UP (ref 0–6)
GLUCOSE SERPL-MCNC: 89 MG/DL — SIGNIFICANT CHANGE UP (ref 70–99)
HCT VFR BLD CALC: 20.6 % — CRITICAL LOW (ref 39–50)
HGB BLD-MCNC: 7.3 G/DL — LOW (ref 13–17)
LDH SERPL L TO P-CCNC: 207 U/L — SIGNIFICANT CHANGE UP (ref 50–242)
LYMPHOCYTES # BLD AUTO: 1.37 K/UL — SIGNIFICANT CHANGE UP (ref 1–3.3)
LYMPHOCYTES # BLD AUTO: 92 % — HIGH (ref 13–44)
MAGNESIUM SERPL-MCNC: 1.6 MG/DL — SIGNIFICANT CHANGE UP (ref 1.6–2.6)
MCHC RBC-ENTMCNC: 29.9 PG — SIGNIFICANT CHANGE UP (ref 27–34)
MCHC RBC-ENTMCNC: 35.4 GM/DL — SIGNIFICANT CHANGE UP (ref 32–36)
MCV RBC AUTO: 84.4 FL — SIGNIFICANT CHANGE UP (ref 80–100)
MONOCYTES # BLD AUTO: 0.06 K/UL — SIGNIFICANT CHANGE UP (ref 0–0.9)
MONOCYTES NFR BLD AUTO: 4 % — SIGNIFICANT CHANGE UP (ref 2–14)
NEUTROPHILS # BLD AUTO: 0.06 K/UL — LOW (ref 1.8–7.4)
NEUTROPHILS NFR BLD AUTO: 4 % — LOW (ref 43–77)
PHOSPHATE SERPL-MCNC: 4.4 MG/DL — SIGNIFICANT CHANGE UP (ref 2.5–4.5)
PLATELET # BLD AUTO: 62 K/UL — LOW (ref 150–400)
POTASSIUM SERPL-MCNC: 4.1 MMOL/L — SIGNIFICANT CHANGE UP (ref 3.5–5.3)
POTASSIUM SERPL-SCNC: 4.1 MMOL/L — SIGNIFICANT CHANGE UP (ref 3.5–5.3)
PROT SERPL-MCNC: 6.6 G/DL — SIGNIFICANT CHANGE UP (ref 6–8.3)
RBC # BLD: 2.44 M/UL — LOW (ref 4.2–5.8)
RBC # FLD: 13.4 % — SIGNIFICANT CHANGE UP (ref 10.3–14.5)
SODIUM SERPL-SCNC: 140 MMOL/L — SIGNIFICANT CHANGE UP (ref 135–145)
URATE SERPL-MCNC: 2.5 MG/DL — LOW (ref 3.4–8.8)
WBC # BLD: 1.49 K/UL — LOW (ref 3.8–10.5)
WBC # FLD AUTO: 1.49 K/UL — LOW (ref 3.8–10.5)

## 2020-05-17 PROCEDURE — 99232 SBSQ HOSP IP/OBS MODERATE 35: CPT | Mod: GC

## 2020-05-17 RX ADMIN — Medication 1 GRAM(S): at 23:43

## 2020-05-17 RX ADMIN — Medication 30 MILLILITER(S): at 17:28

## 2020-05-17 RX ADMIN — Medication 400 MILLIGRAM(S): at 13:28

## 2020-05-17 RX ADMIN — ONDANSETRON 4 MILLIGRAM(S): 8 TABLET, FILM COATED ORAL at 13:28

## 2020-05-17 RX ADMIN — Medication 1 GRAM(S): at 00:00

## 2020-05-17 RX ADMIN — Medication 5 MILLILITER(S): at 08:04

## 2020-05-17 RX ADMIN — CYCLOSPORINE 500 MILLIGRAM(S): 100 CAPSULE ORAL at 05:28

## 2020-05-17 RX ADMIN — Medication 10 MILLIGRAM(S): at 05:27

## 2020-05-17 RX ADMIN — ATOVAQUONE 750 MILLIGRAM(S): 750 SUSPENSION ORAL at 17:22

## 2020-05-17 RX ADMIN — Medication 400 MILLIGRAM(S): at 21:53

## 2020-05-17 RX ADMIN — Medication 5 MILLILITER(S): at 00:00

## 2020-05-17 RX ADMIN — Medication 1 GRAM(S): at 05:28

## 2020-05-17 RX ADMIN — Medication 5 MILLILITER(S): at 21:52

## 2020-05-17 RX ADMIN — Medication 400 MILLIGRAM(S): at 05:27

## 2020-05-17 RX ADMIN — FAMOTIDINE 20 MILLIGRAM(S): 10 INJECTION INTRAVENOUS at 05:27

## 2020-05-17 RX ADMIN — ATOVAQUONE 750 MILLIGRAM(S): 750 SUSPENSION ORAL at 05:27

## 2020-05-17 RX ADMIN — CASPOFUNGIN ACETATE 260 MILLIGRAM(S): 7 INJECTION, POWDER, LYOPHILIZED, FOR SOLUTION INTRAVENOUS at 08:05

## 2020-05-17 RX ADMIN — Medication 5 MILLILITER(S): at 11:04

## 2020-05-17 RX ADMIN — Medication 1 GRAM(S): at 17:23

## 2020-05-17 RX ADMIN — FAMOTIDINE 20 MILLIGRAM(S): 10 INJECTION INTRAVENOUS at 17:23

## 2020-05-17 RX ADMIN — Medication 1 GRAM(S): at 11:04

## 2020-05-17 RX ADMIN — Medication 5 MILLILITER(S): at 17:21

## 2020-05-17 RX ADMIN — Medication 5 MILLILITER(S): at 23:44

## 2020-05-17 RX ADMIN — ELTROMBOPAG OLAMINE 150 MILLIGRAM(S): 50 TABLET, FILM COATED ORAL at 11:04

## 2020-05-17 RX ADMIN — CYCLOSPORINE 500 MILLIGRAM(S): 100 CAPSULE ORAL at 17:23

## 2020-05-17 NOTE — PROGRESS NOTE ADULT - SUBJECTIVE AND OBJECTIVE BOX
Diagnosis: Aplastic Anemia     Protocol/Chemo Regimen: ATG/Cyclosporine/Prednisone/Promacta   (Promacta was held 5/1-5/11 d/t transaminitis)     Day: 25    Pt endorsed: chest discomfort during platelet transfusion     Review of Systems: Denies nausea, vomiting, diarrhea, chest pain, SOB     Pain scale: Denies     Diet: Regular, no concentrated phos     Allergies: No Known Allergies      ANTIMICROBIALS  acyclovir   Oral Tab/Cap 400 milliGRAM(s) Oral every 8 hours  atovaquone Suspension 750 milliGRAM(s) Oral every 12 hours  caspofungin IVPB      caspofungin IVPB 50 milliGRAM(s) IV Intermittent every 24 hours  levoFLOXacin  Tablet 500 milliGRAM(s) Oral every 24 hours      HEME/ONC MEDICATIONS  eltrombopag 150 milliGRAM(s) Oral daily      STANDING MEDICATIONS  antithymocyte globulin equine Skin Test 0.1 milliLiter(s) IntraDermal once  Biotene Dry Mouth Oral Rinse 5 milliLiter(s) Swish and Spit five times a day  cycloSPORINE  (SandIMMUNE) 500 milliGRAM(s) Oral every 12 hours  diphenhydrAMINE   Injectable 25 milliGRAM(s) IV Push once  famotidine    Tablet 20 milliGRAM(s) Oral two times a day  hydrocortisone sodium succinate Injectable 50 milliGRAM(s) IV Push once  sucralfate suspension 1 Gram(s) Oral every 6 hours      PRN MEDICATIONS  acetaminophen   Tablet .. 650 milliGRAM(s) Oral every 6 hours PRN  aluminum hydroxide/magnesium hydroxide/simethicone Suspension 30 milliLiter(s) Oral every 4 hours PRN  diphenhydrAMINE   Injectable 25 milliGRAM(s) IV Push every 12 hours PRN  EPINEPHrine     1 mG/mL Injectable 0.3 milliGRAM(s) IntraMuscular once PRN  hydrocortisone sodium succinate Injectable 50 milliGRAM(s) IV Push every 12 hours PRN  methylPREDNISolone sodium succinate IVPB 500 milliGRAM(s) IV Intermittent once PRN  ondansetron Injectable 4 milliGRAM(s) IV Push every 6 hours PRN      Vital Signs Last 24 Hrs  T(C): 36.2 (17 May 2020 06:00), Max: 36.9 (16 May 2020 12:00)  T(F): 97.2 (17 May 2020 06:00), Max: 98.4 (16 May 2020 12:00)  HR: 95 (17 May 2020 06:00) (86 - 108)  BP: 147/92 (17 May 2020 06:00) (134/78 - 159/89)  BP(mean): --  RR: 18 (17 May 2020 06:00) (18 - 18)  SpO2: 98% (17 May 2020 06:00) (97% - 100%)      PHYSICAL EXAM  General: adult in NAD  HEENT: clear oropharynx, no erythema, no ulcers  Neck: supple  CV: normal S1, S2, RRR  Lungs: clear to auscultation, no wheezes, no rales  Abdomen: soft, nontender, nondistended, +BS  Ext: no edema  Skin: no rash  Neuro: alert and oriented x 3  Peripheral  line: C/D/I        LABS:                        7.0    1.80  )-----------( 46       ( 16 May 2020 07:05 )             20.3         Mean Cell Volume : 85.7 fl  Mean Cell Hemoglobin : 29.5 pg  Mean Cell Hemoglobin Concentration : 34.5 gm/dL  Auto Neutrophil # : 0.06 K/uL  Auto Lymphocyte # : 1.70 K/uL  Auto Monocyte # : 0.03 K/uL  Auto Eosinophil # : 0.00 K/uL  Auto Basophil # : 0.00 K/uL  Auto Neutrophil % : 3.5 %  Auto Lymphocyte % : 94.7 %  Auto Monocyte % : 1.8 %  Auto Eosinophil % : 0.0 %  Auto Basophil % : 0.0 %      05-16    137  |  102  |  18  ----------------------------<  96  4.1   |  25  |  0.87    Ca    8.8      16 May 2020 07:04  Phos  4.8     05-16  Mg     1.4     05-16    TPro  6.0  /  Alb  3.3  /  TBili  2.2<H>  /  DBili  1.1<H>  /  AST  89<H>  /  ALT  190<H>  /  AlkPhos  96  05-16      RECENT CULTURES:    COVID-19 PCR . (05.14.20 @ 14:35)    COVID-19 PCR: NotDetec    RADIOLOGY & ADDITIONAL STUDIES:    < from: US Abdomen Upper Quadrant Right (05.04.20 @ 09:45) >  IMPRESSION:   Normal right upper quadrant abdominal ultrasound. Diagnosis: Aplastic Anemia     Protocol/Chemo Regimen: ATG/Cyclosporine/Prednisone/Promacta   (Promacta was held 5/1-5/11 d/t transaminitis)     Day: 25    Pt endorsed: chest discomfort during platelet transfusion     Review of Systems: Denies nausea, vomiting, diarrhea, chest pain, SOB     Pain scale: Denies     Diet: Regular, no concentrated phos     Allergies: No Known Allergies      ANTIMICROBIALS  acyclovir   Oral Tab/Cap 400 milliGRAM(s) Oral every 8 hours  atovaquone Suspension 750 milliGRAM(s) Oral every 12 hours  caspofungin IVPB      caspofungin IVPB 50 milliGRAM(s) IV Intermittent every 24 hours  levoFLOXacin  Tablet 500 milliGRAM(s) Oral every 24 hours      HEME/ONC MEDICATIONS  eltrombopag 150 milliGRAM(s) Oral daily      STANDING MEDICATIONS  antithymocyte globulin equine Skin Test 0.1 milliLiter(s) IntraDermal once  Biotene Dry Mouth Oral Rinse 5 milliLiter(s) Swish and Spit five times a day  cycloSPORINE  (SandIMMUNE) 500 milliGRAM(s) Oral every 12 hours  diphenhydrAMINE   Injectable 25 milliGRAM(s) IV Push once  famotidine    Tablet 20 milliGRAM(s) Oral two times a day  hydrocortisone sodium succinate Injectable 50 milliGRAM(s) IV Push once  sucralfate suspension 1 Gram(s) Oral every 6 hours      PRN MEDICATIONS  acetaminophen   Tablet .. 650 milliGRAM(s) Oral every 6 hours PRN  aluminum hydroxide/magnesium hydroxide/simethicone Suspension 30 milliLiter(s) Oral every 4 hours PRN  diphenhydrAMINE   Injectable 25 milliGRAM(s) IV Push every 12 hours PRN  EPINEPHrine     1 mG/mL Injectable 0.3 milliGRAM(s) IntraMuscular once PRN  hydrocortisone sodium succinate Injectable 50 milliGRAM(s) IV Push every 12 hours PRN  methylPREDNISolone sodium succinate IVPB 500 milliGRAM(s) IV Intermittent once PRN  ondansetron Injectable 4 milliGRAM(s) IV Push every 6 hours PRN      Vital Signs Last 24 Hrs  T(C): 36.2 (17 May 2020 06:00), Max: 36.9 (16 May 2020 12:00)  T(F): 97.2 (17 May 2020 06:00), Max: 98.4 (16 May 2020 12:00)  HR: 95 (17 May 2020 06:00) (86 - 108)  BP: 147/92 (17 May 2020 06:00) (134/78 - 159/89)  BP(mean): --  RR: 18 (17 May 2020 06:00) (18 - 18)  SpO2: 98% (17 May 2020 06:00) (97% - 100%)      PHYSICAL EXAM  General: adult in NAD  HEENT: clear oropharynx, no erythema, no ulcers  Neck: supple  CV: normal S1, S2, RRR  Lungs: clear to auscultation, no wheezes, no rales  Abdomen: soft, nontender, nondistended, +BS  Ext: no edema  Skin: no rash  Neuro: alert and oriented x 3  Peripheral  line: C/D/I      LABS:                          7.3    1.49  )-----------( 62       ( 17 May 2020 07:10 )             20.6         Mean Cell Volume : 84.4 fl  Mean Cell Hemoglobin : 29.9 pg  Mean Cell Hemoglobin Concentration : 35.4 gm/dL  Auto Neutrophil # : 0.06 K/uL  Auto Lymphocyte # : 1.37 K/uL  Auto Monocyte # : 0.06 K/uL  Auto Eosinophil # : 0.00 K/uL  Auto Basophil # : 0.00 K/uL  Auto Neutrophil % : 4.0 %  Auto Lymphocyte % : 92.0 %  Auto Monocyte % : 4.0 %  Auto Eosinophil % : 0.0 %  Auto Basophil % : 0.0 %      05-17    140  |  101  |  14  ----------------------------<  89  4.1   |  23  |  0.87    Ca    9.2      17 May 2020 07:10  Phos  4.4     05-17  Mg     1.6     05-17    TPro  6.6  /  Alb  3.6  /  TBili  2.5<H>  /  DBili  1.1<H>  /  AST  71<H>  /  ALT  198<H>  /  AlkPhos  112  05-17      Uric Acid 2.5      RECENT CULTURES:    COVID-19 PCR . (05.14.20 @ 14:35)    COVID-19 PCR: NotDetec    RADIOLOGY & ADDITIONAL STUDIES:    < from: US Abdomen Upper Quadrant Right (05.04.20 @ 09:45) >  IMPRESSION:   Normal right upper quadrant abdominal ultrasound.

## 2020-05-17 NOTE — PROGRESS NOTE ADULT - ATTENDING COMMENTS
35yo M with no PMH presented to OSH with dizziness x 2 weeks, easy bruising x 2-4 weeks. At home had a syncopal event and called 911, found to have hemoglobin 3's, Platelets 2K, no evidence of DIC. Diagnosed with AA on BM bx 4/20/20 -started horse ATG/Cyclosporine/Eltrombopag day +24    -receiving transfusions if Hgb< 7, Plt <50k   -optho consult given hazy vision, suspect retinal bleed given low platelets. Platelet transfusional support for goal >50k.   -Paranasal sinusitis on CT head(4/17)- cont Levaquin, begin Posaconazole; on Mepron and Acyclovir with immunosuppressive therapy.  -LFTs grade 3 - switched posaconazole to caspofungin on 4/29 and when still uptrending subsequently held promacta on 5/1. Ordered abdominal sono on 5/3 - normal.  LFTs improving, now < grade 2. Restarted Promacta on 5/12  -hx gastritis -improved, on Pepcid and Carafate  -Cyclosporine level monitored q2-3 days, adjust level to keep therapeutic, next due on 5/18  -OOB/ambulate 33yo M with no PMH presented to OSH with dizziness x 2 weeks, easy bruising x 2-4 weeks. At home had a syncopal event and called 911, found to have hemoglobin 3's, Platelets 2K, no evidence of DIC. Diagnosed with AA on BM bx 4/20/20 -started horse ATG/Cyclosporine/Eltrombopag day +25    -receiving transfusions if Hgb< 7, Plt <50k   -optho consult given hazy vision, suspect retinal bleed given low platelets. Platelet transfusional support for goal >50k.   -Paranasal sinusitis on CT head(4/17)- cont Levaquin, begin Posaconazole; on Mepron and Acyclovir with immunosuppressive therapy.  -LFTs grade 3 - switched posaconazole to caspofungin on 4/29 and when still uptrending subsequently held promacta on 5/1. Ordered abdominal sono on 5/3 - normal.  Restarted Promacta on 5/12. Now AST grade 1, ALT grade 2  -hx gastritis -improved, on Pepcid and Carafate  -Cyclosporine level monitored q2-3 days, adjust level to keep therapeutic, next due on 5/18  -OOB/ambulate

## 2020-05-17 NOTE — PROGRESS NOTE ADULT - PROBLEM SELECTOR PLAN 1
4/20 BM Bx consistent with Aplastic Anemia  Status post Horse ATG 40mg/kg/day daily x 4 days (4/23- 4/26)  Continue Prednisone (taper to continue through 5/17)   CSA dose decreased to 500 mg twice daily. Recheck CSA level on 5/18  Promacta previously held for transaminitis, resumed on 5/12   Monitor CBC/Lytes and transfuse/replete PRN  Keep PLT> 50k for retinal hemorrhage  No concentrated phos diet due to mild hyperphosphatemia  Strict Is and Os/Daily weights/Mouth Care  PLT transfusion reaction during completion of transfusion with chest discomfort (pt initially refused benadryl pre-medication) and improved with administration of benadryl). EKG and CXR wnl 4/20 BM Bx consistent with Aplastic Anemia  Status post Horse ATG 40mg/kg/day daily x 4 days (4/23- 4/26)  Continue Prednisone (taper to continue through 5/17)   CSA dose decreased to 500 mg twice daily. Recheck CSA level on 5/18  Promacta previously held for transaminitis, resumed on 5/12   Monitor CBC/Lytes and transfuse/replete PRN  Keep PLT> 50k for retinal hemorrhage  No concentrated phos diet due to mild hyperphosphatemia  Strict Is and Os/Daily weights/Mouth Care  PLT transfusion reaction during completion of transfusion with chest discomfort (pt initially refused benadryl pre-medication) and improved with administration of benadryl). EKG and CXR wnl.

## 2020-05-17 NOTE — PROGRESS NOTE ADULT - PROBLEM SELECTOR PLAN 3
Grade 3 transaminitis with hyperbilirubinemia, improving   Posaconazole changed to caspofungin  5/1 Promacta placed on hold  5/4 US Abdomen unremarkable  Promacta resumed on 5/12 Grade 3 transaminitis with hyperbilirubinemia, improved, AST grade 1, ALT/bili grade 2  Posaconazole changed to caspofungin  5/1 Promacta placed on hold  5/4 US Abdomen unremarkable  Promacta resumed on 5/12

## 2020-05-18 DIAGNOSIS — Z02.9 ENCOUNTER FOR ADMINISTRATIVE EXAMINATIONS, UNSPECIFIED: ICD-10-CM

## 2020-05-18 LAB
ALBUMIN SERPL ELPH-MCNC: 3.5 G/DL — SIGNIFICANT CHANGE UP (ref 3.3–5)
ALP SERPL-CCNC: 111 U/L — SIGNIFICANT CHANGE UP (ref 40–120)
ALT FLD-CCNC: 157 U/L — HIGH (ref 10–45)
ANION GAP SERPL CALC-SCNC: 13 MMOL/L — SIGNIFICANT CHANGE UP (ref 5–17)
APTT BLD: 23 SEC — LOW (ref 27.5–36.3)
AST SERPL-CCNC: 47 U/L — HIGH (ref 10–40)
BASOPHILS # BLD AUTO: 0 K/UL — SIGNIFICANT CHANGE UP (ref 0–0.2)
BASOPHILS NFR BLD AUTO: 0 % — SIGNIFICANT CHANGE UP (ref 0–2)
BILIRUB DIRECT SERPL-MCNC: 1 MG/DL — HIGH (ref 0–0.2)
BILIRUB SERPL-MCNC: 2.3 MG/DL — HIGH (ref 0.2–1.2)
BLD GP AB SCN SERPL QL: NEGATIVE — SIGNIFICANT CHANGE UP
BUN SERPL-MCNC: 24 MG/DL — HIGH (ref 7–23)
CALCIUM SERPL-MCNC: 9.4 MG/DL — SIGNIFICANT CHANGE UP (ref 8.4–10.5)
CHLORIDE SERPL-SCNC: 103 MMOL/L — SIGNIFICANT CHANGE UP (ref 96–108)
CO2 SERPL-SCNC: 22 MMOL/L — SIGNIFICANT CHANGE UP (ref 22–31)
CREAT SERPL-MCNC: 0.91 MG/DL — SIGNIFICANT CHANGE UP (ref 0.5–1.3)
CYCLOSPORINE SER-MCNC: 514 NG/ML — CRITICAL HIGH (ref 150–400)
EOSINOPHIL # BLD AUTO: 0 K/UL — SIGNIFICANT CHANGE UP (ref 0–0.5)
EOSINOPHIL NFR BLD AUTO: 0 % — SIGNIFICANT CHANGE UP (ref 0–6)
FIBRINOGEN PPP-MCNC: 661 MG/DL — HIGH (ref 350–510)
GLUCOSE SERPL-MCNC: 94 MG/DL — SIGNIFICANT CHANGE UP (ref 70–99)
HCT VFR BLD CALC: 19.4 % — CRITICAL LOW (ref 39–50)
HGB BLD-MCNC: 6.6 G/DL — CRITICAL LOW (ref 13–17)
INR BLD: 0.98 RATIO — SIGNIFICANT CHANGE UP (ref 0.88–1.16)
LDH SERPL L TO P-CCNC: 199 U/L — SIGNIFICANT CHANGE UP (ref 50–242)
LYMPHOCYTES # BLD AUTO: 1.46 K/UL — SIGNIFICANT CHANGE UP (ref 1–3.3)
LYMPHOCYTES # BLD AUTO: 88 % — HIGH (ref 13–44)
MAGNESIUM SERPL-MCNC: 1.6 MG/DL — SIGNIFICANT CHANGE UP (ref 1.6–2.6)
MCHC RBC-ENTMCNC: 29.1 PG — SIGNIFICANT CHANGE UP (ref 27–34)
MCHC RBC-ENTMCNC: 34 GM/DL — SIGNIFICANT CHANGE UP (ref 32–36)
MCV RBC AUTO: 85.5 FL — SIGNIFICANT CHANGE UP (ref 80–100)
MONOCYTES # BLD AUTO: 0.06 K/UL — SIGNIFICANT CHANGE UP (ref 0–0.9)
MONOCYTES NFR BLD AUTO: 3.6 % — SIGNIFICANT CHANGE UP (ref 2–14)
NEUTROPHILS # BLD AUTO: 0.14 K/UL — LOW (ref 1.8–7.4)
NEUTROPHILS NFR BLD AUTO: 8.4 % — LOW (ref 43–77)
NRBC # BLD: 0 /100 WBCS — SIGNIFICANT CHANGE UP (ref 0–0)
PHOSPHATE SERPL-MCNC: 5.4 MG/DL — HIGH (ref 2.5–4.5)
PLATELET # BLD AUTO: 49 K/UL — LOW (ref 150–400)
POTASSIUM SERPL-MCNC: 4.1 MMOL/L — SIGNIFICANT CHANGE UP (ref 3.5–5.3)
POTASSIUM SERPL-SCNC: 4.1 MMOL/L — SIGNIFICANT CHANGE UP (ref 3.5–5.3)
PROT SERPL-MCNC: 6.3 G/DL — SIGNIFICANT CHANGE UP (ref 6–8.3)
PROTHROM AB SERPL-ACNC: 11.2 SEC — SIGNIFICANT CHANGE UP (ref 10–12.9)
RBC # BLD: 2.27 M/UL — LOW (ref 4.2–5.8)
RBC # FLD: 13.5 % — SIGNIFICANT CHANGE UP (ref 10.3–14.5)
RH IG SCN BLD-IMP: POSITIVE — SIGNIFICANT CHANGE UP
SODIUM SERPL-SCNC: 138 MMOL/L — SIGNIFICANT CHANGE UP (ref 135–145)
URATE SERPL-MCNC: 2.7 MG/DL — LOW (ref 3.4–8.8)
WBC # BLD: 1.66 K/UL — LOW (ref 3.8–10.5)
WBC # FLD AUTO: 1.66 K/UL — LOW (ref 3.8–10.5)

## 2020-05-18 PROCEDURE — 99232 SBSQ HOSP IP/OBS MODERATE 35: CPT | Mod: GC

## 2020-05-18 RX ORDER — ACETAMINOPHEN 500 MG
2 TABLET ORAL
Qty: 0 | Refills: 0 | DISCHARGE
Start: 2020-05-18

## 2020-05-18 RX ORDER — CIPROFLOXACIN LACTATE 400MG/40ML
1 VIAL (ML) INTRAVENOUS
Qty: 30 | Refills: 0
Start: 2020-05-18 | End: 2020-06-16

## 2020-05-18 RX ORDER — ONDANSETRON 8 MG/1
1 TABLET, FILM COATED ORAL
Qty: 90 | Refills: 3
Start: 2020-05-18 | End: 2020-09-14

## 2020-05-18 RX ORDER — ACYCLOVIR SODIUM 500 MG
1 VIAL (EA) INTRAVENOUS
Qty: 90 | Refills: 0
Start: 2020-05-18 | End: 2020-06-16

## 2020-05-18 RX ORDER — SUCRALFATE 1 G
10 TABLET ORAL
Qty: 1200 | Refills: 0
Start: 2020-05-18 | End: 2020-06-16

## 2020-05-18 RX ORDER — CALCIUM ACETATE 667 MG
667 TABLET ORAL
Refills: 0 | Status: DISCONTINUED | OUTPATIENT
Start: 2020-05-18 | End: 2020-05-20

## 2020-05-18 RX ORDER — CYCLOSPORINE 100 MG/1
400 CAPSULE ORAL EVERY 12 HOURS
Refills: 0 | Status: DISCONTINUED | OUTPATIENT
Start: 2020-05-18 | End: 2020-05-20

## 2020-05-18 RX ORDER — CYCLOSPORINE 100 MG/1
4 CAPSULE ORAL
Qty: 240 | Refills: 0
Start: 2020-05-18 | End: 2020-06-16

## 2020-05-18 RX ORDER — LORATADINE 10 MG/1
10 TABLET ORAL DAILY
Refills: 0 | Status: DISCONTINUED | OUTPATIENT
Start: 2020-05-18 | End: 2020-05-20

## 2020-05-18 RX ORDER — FAMOTIDINE 10 MG/ML
1 INJECTION INTRAVENOUS
Qty: 60 | Refills: 0
Start: 2020-05-18 | End: 2020-06-16

## 2020-05-18 RX ORDER — FLUCONAZOLE 150 MG/1
1 TABLET ORAL
Qty: 30 | Refills: 0
Start: 2020-05-18 | End: 2020-06-16

## 2020-05-18 RX ADMIN — Medication 5 MILLILITER(S): at 12:00

## 2020-05-18 RX ADMIN — Medication 650 MILLIGRAM(S): at 09:26

## 2020-05-18 RX ADMIN — Medication 667 MILLIGRAM(S): at 21:31

## 2020-05-18 RX ADMIN — Medication 25 MILLIGRAM(S): at 10:32

## 2020-05-18 RX ADMIN — CASPOFUNGIN ACETATE 260 MILLIGRAM(S): 7 INJECTION, POWDER, LYOPHILIZED, FOR SOLUTION INTRAVENOUS at 07:39

## 2020-05-18 RX ADMIN — Medication 1 GRAM(S): at 12:00

## 2020-05-18 RX ADMIN — Medication 5 MILLILITER(S): at 17:55

## 2020-05-18 RX ADMIN — Medication 667 MILLIGRAM(S): at 17:55

## 2020-05-18 RX ADMIN — Medication 1 GRAM(S): at 17:55

## 2020-05-18 RX ADMIN — ONDANSETRON 4 MILLIGRAM(S): 8 TABLET, FILM COATED ORAL at 09:24

## 2020-05-18 RX ADMIN — Medication 5 MILLILITER(S): at 07:40

## 2020-05-18 RX ADMIN — Medication 30 MILLILITER(S): at 21:32

## 2020-05-18 RX ADMIN — Medication 1 GRAM(S): at 06:03

## 2020-05-18 RX ADMIN — FAMOTIDINE 20 MILLIGRAM(S): 10 INJECTION INTRAVENOUS at 17:55

## 2020-05-18 RX ADMIN — ATOVAQUONE 750 MILLIGRAM(S): 750 SUSPENSION ORAL at 17:55

## 2020-05-18 RX ADMIN — CYCLOSPORINE 500 MILLIGRAM(S): 100 CAPSULE ORAL at 06:02

## 2020-05-18 RX ADMIN — Medication 50 MILLIGRAM(S): at 09:26

## 2020-05-18 RX ADMIN — Medication 400 MILLIGRAM(S): at 06:03

## 2020-05-18 RX ADMIN — Medication 400 MILLIGRAM(S): at 21:32

## 2020-05-18 RX ADMIN — CYCLOSPORINE 400 MILLIGRAM(S): 100 CAPSULE ORAL at 17:55

## 2020-05-18 RX ADMIN — Medication 400 MILLIGRAM(S): at 13:15

## 2020-05-18 RX ADMIN — Medication 5 MILLILITER(S): at 21:31

## 2020-05-18 RX ADMIN — FAMOTIDINE 20 MILLIGRAM(S): 10 INJECTION INTRAVENOUS at 06:03

## 2020-05-18 RX ADMIN — ELTROMBOPAG OLAMINE 150 MILLIGRAM(S): 50 TABLET, FILM COATED ORAL at 12:00

## 2020-05-18 RX ADMIN — Medication 667 MILLIGRAM(S): at 10:31

## 2020-05-18 RX ADMIN — ATOVAQUONE 750 MILLIGRAM(S): 750 SUSPENSION ORAL at 06:04

## 2020-05-18 NOTE — PROGRESS NOTE ADULT - PROBLEM SELECTOR PLAN 4
Intraretinal hemorrhage from thrombocytopenia and anemia  Transfuse to keep PLT >50k   Opthalmology consultation appreciated. Will need to follow up as outpatient Intraretinal hemorrhage from thrombocytopenia and anemia  Transfuse to keep PLT >=20k   Opthalmology consultation appreciated. Will need to follow up as outpatient

## 2020-05-18 NOTE — PROVIDER CONTACT NOTE (CRITICAL VALUE NOTIFICATION) - ASSESSMENT
NAD
A/Ox4, no signs of bleeding, no c/o abdominal pain
Asymptomatic
NAD, afebrile  Pt denies SOB, chest pain
NAD, no signs of bleeding, VSS
No signs of bleeding, VSS
Pt A&Ox4. VSS, afebrile. OOB, independent. Denies chest pain, headaches, SOB, N/V/D, bruising, swelling, bleeding.
Pt alert and oriented. No acute distress noted. No signs/symptoms of bleeding noted.
asymptomatic

## 2020-05-18 NOTE — PROGRESS NOTE ADULT - PROBLEM SELECTOR PLAN 3
Grade 3 transaminitis with hyperbilirubinemia, improved, AST grade 1, ALT/bili grade 2  Posaconazole changed to caspofungin  5/1 Promacta placed on hold  5/4 US Abdomen unremarkable  Promacta resumed on 5/12

## 2020-05-18 NOTE — CHART NOTE - NSCHARTNOTEFT_GEN_A_CORE
Nutrition Follow Up Note  Patient seen for: Nutrition follow up. Pt with newly diagnosed aplastic anemia receiving treatment. Pt currently with hyperphosphatemia.     Source: Pt    Diet : No concentrated phosphorus diet with ensure enlive 1 daily     Patient reports: recent nausea, no episodes of vomiting, normal bowel movements      PO intake : Pt reports appetite has been fair and due to nausea he has been eating more starchy foods and less meat, pt will try to have eggs and turkey sausage and chicken noodle soup which he tolerates more. Pt has not been taking ensure enlive as frequently, requests these to be discontinued.       Weight: 215.4 lbs (4/18), 214.2 lbs (5/6), 213.8 lbs (5/11), 213.9 lbs (5/17), minor fluctuations noted, relatively stable.     Pertinent Medications: MEDICATIONS  (STANDING):  acyclovir   Oral Tab/Cap 400 milliGRAM(s) Oral every 8 hours  antithymocyte globulin equine Skin Test 0.1 milliLiter(s) IntraDermal once  atovaquone Suspension 750 milliGRAM(s) Oral every 12 hours  Biotene Dry Mouth Oral Rinse 5 milliLiter(s) Swish and Spit five times a day  calcium acetate 667 milliGRAM(s) Oral four times a day with meals  caspofungin IVPB      caspofungin IVPB 50 milliGRAM(s) IV Intermittent every 24 hours  cycloSPORINE  (SandIMMUNE) 400 milliGRAM(s) Oral every 12 hours  diphenhydrAMINE   Injectable 25 milliGRAM(s) IV Push once  eltrombopag 150 milliGRAM(s) Oral daily  famotidine    Tablet 20 milliGRAM(s) Oral two times a day  hydrocortisone sodium succinate Injectable 50 milliGRAM(s) IV Push once  levoFLOXacin  Tablet 500 milliGRAM(s) Oral every 24 hours  sucralfate suspension 1 Gram(s) Oral every 6 hours    MEDICATIONS  (PRN):  acetaminophen   Tablet .. 650 milliGRAM(s) Oral every 6 hours PRN Temp greater or equal to 38C (100.4F), Mild Pain (1 - 3)  aluminum hydroxide/magnesium hydroxide/simethicone Suspension 30 milliLiter(s) Oral every 4 hours PRN Dyspepsia  EPINEPHrine     1 mG/mL Injectable 0.3 milliGRAM(s) IntraMuscular once PRN anaphylaxis  hydrocortisone sodium succinate Injectable 50 milliGRAM(s) IV Push every 12 hours PRN Pre transfusion for blood products  loratadine 10 milliGRAM(s) Oral daily PRN pre blood product  methylPREDNISolone sodium succinate IVPB 500 milliGRAM(s) IV Intermittent once PRN anaphylaxis  ondansetron Injectable 4 milliGRAM(s) IV Push every 6 hours PRN Nausea and/or Vomiting    Pertinent Labs: 05-18 @ 07:00: Na 138, BUN 24<H>, Cr 0.91, BG 94, K+ 4.1, Phos 5.4<H>, Mg 1.6, Alk Phos 111, ALT/SGPT 157<H>, AST/SGOT 47<H>, HbA1c --    Finger Sticks: none       Skin per nursing documentation: free of pressure injury   Edema: none     Estimated Needs:   [ x] no change since previous assessment  [ ] recalculated:     Previous Nutrition Diagnosis: Predicted suboptimal energy intake   Nutrition Diagnosis is: ongoing, addressed with supplements and preferences     New Nutrition Diagnosis:  Related to:    As evidenced by:      Interventions:     Recommend  1) Continue no concentrated phosphorus diet as ordered, consider discontinuing ensure shakes for now  2) Continue to encourage po intake and obtain/honor food preferences as able, RD reviewed N+V nutrition therapy    Monitoring and Evaluation:     Continue to monitor Nutritional intake, Tolerance to diet prescription, weights, labs, skin integrity    RD remains available upon request and will follow up per protocol    Angie Maki R.D., [facility], Pager #[phone]

## 2020-05-18 NOTE — PROGRESS NOTE ADULT - ATTENDING COMMENTS
33yo M with no PMH presented to OSH with dizziness x 2 weeks, easy bruising x 2-4 weeks. At home had a syncopal event and called 911, found to have hemoglobin 3's, Platelets 2K, no evidence of DIC. Diagnosed with AA on BM bx 4/20/20 -started horse ATG/Cyclosporine/Eltrombopag day +25    -receiving transfusions if Hgb< 7, Plt <50k   -optho consult given hazy vision, suspect retinal bleed given low platelets. Platelet transfusional support for goal >50k.   -Paranasal sinusitis on CT head(4/17)- cont Levaquin, begin Posaconazole; on Mepron and Acyclovir with immunosuppressive therapy.  -LFTs grade 3 - switched posaconazole to caspofungin on 4/29 and when still uptrending subsequently held promacta on 5/1. Ordered abdominal sono on 5/3 - normal.  Restarted Promacta on 5/12. Now AST grade 1, ALT grade 2  -hx gastritis -improved, on Pepcid and Carafate  -Cyclosporine level monitored q2-3 days, adjust level to keep therapeutic, next due on 5/18  -OOB/ambulate 33yo M with no PMH presented to OSH with dizziness x 2 weeks, easy bruising x 2-4 weeks. At home had a syncopal event and called 911, found to have hemoglobin 3's, Platelets 2K, no evidence of DIC. Diagnosed with AA on BM bx 4/20/20 -started horse ATG/Cyclosporine/Eltrombopag day +26    -receiving transfusions if Hgb< 7, Plt <50k   -optho consult given hazy vision, suspect retinal bleed given low platelets. Platelet transfusional support for goal >50k.   -Paranasal sinusitis on CT head(4/17)- cont Levaquin, begin Posaconazole; on Mepron and Acyclovir with immunosuppressive therapy.  -LFTs went up to grade 3 - switched posaconazole to caspofungin on 4/29 and when still up trending subsequently held promacta on 5/1. Ordered abdominal sono on 5/3 - normal.  Restarted Promacta on 5/12. Now AST grade 1, ALT grade 2  -hx gastritis -improved, on Pepcid and Carafate  -Cyclosporine level monitored q2-3 days, adjust level to keep therapeutic, due on 5/18 -f/u results  -OOB/ambulate

## 2020-05-18 NOTE — PROGRESS NOTE ADULT - SUBJECTIVE AND OBJECTIVE BOX
Diagnosis: Aplastic Anemia     Protocol/Chemo Regimen: ATG/Cyclosporine/Prednisone/Promacta   (Promacta was held 5/1-5/11 d/t transaminitis)     Day: 26    Pt endorsed: chest discomfort during platelet transfusion, improved      Review of Systems: Denies nausea, vomiting, diarrhea, chest pain, SOB     Pain scale: Denies     Diet: Regular, no concentrated phos     Allergies: No Known Allergies      ANTIMICROBIALS  acyclovir   Oral Tab/Cap 400 milliGRAM(s) Oral every 8 hours  atovaquone Suspension 750 milliGRAM(s) Oral every 12 hours  caspofungin IVPB      caspofungin IVPB 50 milliGRAM(s) IV Intermittent every 24 hours  levoFLOXacin  Tablet 500 milliGRAM(s) Oral every 24 hours      HEME/ONC MEDICATIONS  eltrombopag 150 milliGRAM(s) Oral daily      STANDING MEDICATIONS  antithymocyte globulin equine Skin Test 0.1 milliLiter(s) IntraDermal once  Biotene Dry Mouth Oral Rinse 5 milliLiter(s) Swish and Spit five times a day  cycloSPORINE  (SandIMMUNE) 500 milliGRAM(s) Oral every 12 hours  diphenhydrAMINE   Injectable 25 milliGRAM(s) IV Push once  famotidine    Tablet 20 milliGRAM(s) Oral two times a day  hydrocortisone sodium succinate Injectable 50 milliGRAM(s) IV Push once  sucralfate suspension 1 Gram(s) Oral every 6 hours      PRN MEDICATIONS  acetaminophen   Tablet .. 650 milliGRAM(s) Oral every 6 hours PRN  aluminum hydroxide/magnesium hydroxide/simethicone Suspension 30 milliLiter(s) Oral every 4 hours PRN  diphenhydrAMINE   Injectable 25 milliGRAM(s) IV Push every 12 hours PRN  EPINEPHrine     1 mG/mL Injectable 0.3 milliGRAM(s) IntraMuscular once PRN  hydrocortisone sodium succinate Injectable 50 milliGRAM(s) IV Push every 12 hours PRN  methylPREDNISolone sodium succinate IVPB 500 milliGRAM(s) IV Intermittent once PRN  ondansetron Injectable 4 milliGRAM(s) IV Push every 6 hours PRN      Vital Signs Last 24 Hrs  T(C): 35.8 (18 May 2020 05:10), Max: 36.4 (17 May 2020 17:20)  T(F): 96.5 (18 May 2020 05:10), Max: 97.5 (17 May 2020 17:20)  HR: 91 (18 May 2020 05:10) (91 - 101)  BP: 128/77 (18 May 2020 05:10) (128/77 - 152/90)  BP(mean): --  RR: 18 (18 May 2020 05:10) (18 - 18)  SpO2: 98% (18 May 2020 05:10) (97% - 99%)      PHYSICAL EXAM  General: adult in NAD  HEENT: clear oropharynx, no erythema, no ulcers  Neck: supple  CV: normal S1, S2, RRR  Lungs: clear to auscultation, no wheezes, no rales  Abdomen: soft, nontender, nondistended, +BS  Ext: no edema  Skin: no rash  Neuro: alert and oriented x 3  Peripheral  line: C/D/I        LABS:                        7.3    1.49  )-----------( 62       ( 17 May 2020 07:10 )             20.6         Mean Cell Volume : 84.4 fl  Mean Cell Hemoglobin : 29.9 pg  Mean Cell Hemoglobin Concentration : 35.4 gm/dL  Auto Neutrophil # : 0.06 K/uL  Auto Lymphocyte # : 1.37 K/uL  Auto Monocyte # : 0.06 K/uL  Auto Eosinophil # : 0.00 K/uL  Auto Basophil # : 0.00 K/uL  Auto Neutrophil % : 4.0 %  Auto Lymphocyte % : 92.0 %  Auto Monocyte % : 4.0 %  Auto Eosinophil % : 0.0 %  Auto Basophil % : 0.0 %      05-17    140  |  101  |  14  ----------------------------<  89  4.1   |  23  |  0.87    Ca    9.2      17 May 2020 07:10  Phos  4.4     05-17  Mg     1.6     05-17    TPro  6.6  /  Alb  3.6  /  TBili  2.5<H>  /  DBili  1.1<H>  /  AST  71<H>  /  ALT  198<H>  /  AlkPhos  112  05-17                  RECENT CULTURES:    COVID-19 PCR . (05.14.20 @ 14:35)    COVID-19 PCR: NotDetec      RADIOLOGY & ADDITIONAL STUDIES:    < from: US Abdomen Upper Quadrant Right (05.04.20 @ 09:45) >  IMPRESSION:   Normal right upper quadrant abdominal ultrasound. Diagnosis: Aplastic Anemia     Protocol/Chemo Regimen: ATG/Cyclosporine/Prednisone/Promacta   (Promacta was held 5/1-5/11 d/t transaminitis)     Day: 26    Pt endorsed: mild nausea; mild abd pain     Review of Systems: Denies nausea, vomiting, diarrhea, chest pain, SOB     Pain scale: 3-4/10    Diet: Regular, no concentrated phos     Allergies: No Known Allergies      ANTIMICROBIALS  acyclovir   Oral Tab/Cap 400 milliGRAM(s) Oral every 8 hours  atovaquone Suspension 750 milliGRAM(s) Oral every 12 hours  caspofungin IVPB      caspofungin IVPB 50 milliGRAM(s) IV Intermittent every 24 hours  levoFLOXacin  Tablet 500 milliGRAM(s) Oral every 24 hours      HEME/ONC MEDICATIONS  eltrombopag 150 milliGRAM(s) Oral daily      STANDING MEDICATIONS  antithymocyte globulin equine Skin Test 0.1 milliLiter(s) IntraDermal once  Biotene Dry Mouth Oral Rinse 5 milliLiter(s) Swish and Spit five times a day  cycloSPORINE  (SandIMMUNE) 500 milliGRAM(s) Oral every 12 hours  diphenhydrAMINE   Injectable 25 milliGRAM(s) IV Push once  famotidine    Tablet 20 milliGRAM(s) Oral two times a day  hydrocortisone sodium succinate Injectable 50 milliGRAM(s) IV Push once  sucralfate suspension 1 Gram(s) Oral every 6 hours      PRN MEDICATIONS  acetaminophen   Tablet .. 650 milliGRAM(s) Oral every 6 hours PRN  aluminum hydroxide/magnesium hydroxide/simethicone Suspension 30 milliLiter(s) Oral every 4 hours PRN  diphenhydrAMINE   Injectable 25 milliGRAM(s) IV Push every 12 hours PRN  EPINEPHrine     1 mG/mL Injectable 0.3 milliGRAM(s) IntraMuscular once PRN  hydrocortisone sodium succinate Injectable 50 milliGRAM(s) IV Push every 12 hours PRN  methylPREDNISolone sodium succinate IVPB 500 milliGRAM(s) IV Intermittent once PRN  ondansetron Injectable 4 milliGRAM(s) IV Push every 6 hours PRN      Vital Signs Last 24 Hrs  T(C): 35.8 (18 May 2020 05:10), Max: 36.4 (17 May 2020 17:20)  T(F): 96.5 (18 May 2020 05:10), Max: 97.5 (17 May 2020 17:20)  HR: 91 (18 May 2020 05:10) (91 - 101)  BP: 128/77 (18 May 2020 05:10) (128/77 - 152/90)  BP(mean): --  RR: 18 (18 May 2020 05:10) (18 - 18)  SpO2: 98% (18 May 2020 05:10) (97% - 99%)      PHYSICAL EXAM  General: adult in NAD  HEENT: clear oropharynx, no erythema, no ulcers  Neck: supple  CV: normal S1, S2, RRR  Lungs: clear to auscultation, no wheezes, no rales  Abdomen: soft, nontender, nondistended, +BS  Ext: no edema  Skin: no rash  Neuro: alert and oriented x 3  Peripheral  line: C/D/I      LABS:                        6.6    1.66  )-----------( 49       ( 18 May 2020 07:00 )             19.4         Mean Cell Volume : 85.5 fl  Mean Cell Hemoglobin : 29.1 pg  Mean Cell Hemoglobin Concentration : 34.0 gm/dL  Auto Neutrophil # : 0.14 K/uL  Auto Lymphocyte # : 1.46 K/uL  Auto Monocyte # : 0.06 K/uL  Auto Eosinophil # : 0.00 K/uL  Auto Basophil # : 0.00 K/uL  Auto Neutrophil % : 8.4 %  Auto Lymphocyte % : 88.0 %  Auto Monocyte % : 3.6 %  Auto Eosinophil % : 0.0 %  Auto Basophil % : 0.0 %      05-18    138  |  103  |  24<H>  ----------------------------<  94  4.1   |  22  |  0.91    Ca    9.4      18 May 2020 07:00  Phos  5.4     05-18  Mg     1.6     05-18    TPro  6.3  /  Alb  3.5  /  TBili  2.3<H>  /  DBili  1.0<H>  /  AST  47<H>  /  ALT  157<H>  /  AlkPhos  111  05-18    PT/INR - ( 18 May 2020 07:00 )   PT: 11.2 sec;   INR: 0.98 ratio         PTT - ( 18 May 2020 07:00 )  PTT:23.0 sec      Uric Acid 2.7      RECENT CULTURES:    COVID-19 PCR . (05.14.20 @ 14:35)    COVID-19 PCR: NotDetec      RADIOLOGY & ADDITIONAL STUDIES:    < from: US Abdomen Upper Quadrant Right (05.04.20 @ 09:45) >  IMPRESSION:   Normal right upper quadrant abdominal ultrasound. Diagnosis: Aplastic Anemia     Protocol/Chemo Regimen: ATG/Cyclosporine/Prednisone/Promacta   (Promacta was held 5/1-5/11 d/t transaminitis)     Day: 26    Pt endorsed: mild nausea; mild abd pain     Review of Systems: Denies nausea, vomiting, diarrhea, chest pain, SOB     Pain scale: 3-4/10    Diet: Regular, no concentrated phos     Allergies: No Known Allergies      ANTIMICROBIALS  acyclovir   Oral Tab/Cap 400 milliGRAM(s) Oral every 8 hours  atovaquone Suspension 750 milliGRAM(s) Oral every 12 hours  caspofungin IVPB      caspofungin IVPB 50 milliGRAM(s) IV Intermittent every 24 hours  levoFLOXacin  Tablet 500 milliGRAM(s) Oral every 24 hours      HEME/ONC MEDICATIONS  eltrombopag 150 milliGRAM(s) Oral daily      STANDING MEDICATIONS  antithymocyte globulin equine Skin Test 0.1 milliLiter(s) IntraDermal once  Biotene Dry Mouth Oral Rinse 5 milliLiter(s) Swish and Spit five times a day  cycloSPORINE  (SandIMMUNE) 500 milliGRAM(s) Oral every 12 hours  diphenhydrAMINE   Injectable 25 milliGRAM(s) IV Push once  famotidine    Tablet 20 milliGRAM(s) Oral two times a day  hydrocortisone sodium succinate Injectable 50 milliGRAM(s) IV Push once  sucralfate suspension 1 Gram(s) Oral every 6 hours      PRN MEDICATIONS  acetaminophen   Tablet .. 650 milliGRAM(s) Oral every 6 hours PRN  aluminum hydroxide/magnesium hydroxide/simethicone Suspension 30 milliLiter(s) Oral every 4 hours PRN  diphenhydrAMINE   Injectable 25 milliGRAM(s) IV Push every 12 hours PRN  EPINEPHrine     1 mG/mL Injectable 0.3 milliGRAM(s) IntraMuscular once PRN  hydrocortisone sodium succinate Injectable 50 milliGRAM(s) IV Push every 12 hours PRN  methylPREDNISolone sodium succinate IVPB 500 milliGRAM(s) IV Intermittent once PRN  ondansetron Injectable 4 milliGRAM(s) IV Push every 6 hours PRN      Vital Signs Last 24 Hrs  T(C): 35.8 (18 May 2020 05:10), Max: 36.4 (17 May 2020 17:20)  T(F): 96.5 (18 May 2020 05:10), Max: 97.5 (17 May 2020 17:20)  HR: 91 (18 May 2020 05:10) (91 - 101)  BP: 128/77 (18 May 2020 05:10) (128/77 - 152/90)  BP(mean): --  RR: 18 (18 May 2020 05:10) (18 - 18)  SpO2: 98% (18 May 2020 05:10) (97% - 99%)      PHYSICAL EXAM  General: adult in NAD  HEENT: clear oropharynx, no erythema, no ulcers  Neck: supple  CV: normal S1, S2, RRR  Lungs: clear to auscultation, no wheezes, no rales  Abdomen: soft, nontender, nondistended, +BS  Ext: no edema  Skin: no rash  Neuro: alert and oriented x 3  Peripheral  line: C/D/I      LABS:                        6.6    1.66  )-----------( 49       ( 18 May 2020 07:00 )             19.4         Mean Cell Volume : 85.5 fl  Mean Cell Hemoglobin : 29.1 pg  Mean Cell Hemoglobin Concentration : 34.0 gm/dL  Auto Neutrophil # : 0.14 K/uL  Auto Lymphocyte # : 1.46 K/uL  Auto Monocyte # : 0.06 K/uL  Auto Eosinophil # : 0.00 K/uL  Auto Basophil # : 0.00 K/uL  Auto Neutrophil % : 8.4 %  Auto Lymphocyte % : 88.0 %  Auto Monocyte % : 3.6 %  Auto Eosinophil % : 0.0 %  Auto Basophil % : 0.0 %      05-18    138  |  103  |  24<H>  ----------------------------<  94  4.1   |  22  |  0.91    Ca    9.4      18 May 2020 07:00  Phos  5.4     05-18  Mg     1.6     05-18    TPro  6.3  /  Alb  3.5  /  TBili  2.3<H>  /  DBili  1.0<H>  /  AST  47<H>  /  ALT  157<H>  /  AlkPhos  111  05-18    PT/INR - ( 18 May 2020 07:00 )   PT: 11.2 sec;   INR: 0.98 ratio         PTT - ( 18 May 2020 07:00 )  PTT:23.0 sec      Uric Acid 2.7    < from: 12 Lead ECG (05.16.20 @ 12:17) >  QTC Calculation(Bezet) 415 ms  Diagnosis Line SINUS RHYTHM WITH MARKED SINUS ARRHYTHMIA  OTHERWISE NORMAL ECG      RECENT CULTURES:    COVID-19 PCR . (05.14.20 @ 14:35)    COVID-19 PCR: NotDetec      RADIOLOGY & ADDITIONAL STUDIES:    < from: US Abdomen Upper Quadrant Right (05.04.20 @ 09:45) >  IMPRESSION:   Normal right upper quadrant abdominal ultrasound.

## 2020-05-18 NOTE — PROVIDER CONTACT NOTE (CRITICAL VALUE NOTIFICATION) - ACTION/TREATMENT ORDERED:
No orders at this time
No orders given
transfusion
no orders received
1U PRBCs
Cyclosporine dose decreased from 500 mg to 400 mg PO
1 unit PRBC
1 unit PRBCs will pass onto day RN. Will continue to monitor.
1 unit of PRBCs
1 unit plt  1 unit PRBC
No new orders at this time
No orders at this time.

## 2020-05-18 NOTE — PROGRESS NOTE ADULT - PROBLEM SELECTOR PLAN 1
4/20 BM Bx consistent with Aplastic Anemia  Status post Horse ATG 40mg/kg/day daily x 4 days (4/23- 4/26)  Continue Prednisone (taper to continue through 5/17)   CSA dose decreased to 500 mg twice daily. Recheck CSA level on 5/18  Promacta previously held for transaminitis, resumed on 5/12   Monitor CBC/Lytes and transfuse/replete PRN  Keep PLT> 50k for retinal hemorrhage  No concentrated phos diet due to mild hyperphosphatemia  Strict Is and Os/Daily weights/Mouth Care  PLT transfusion reaction during completion of transfusion with chest discomfort (pt initially refused benadryl pre-medication) and improved with administration of benadryl). EKG and CXR wnl. 4/20 BM Bx consistent with Aplastic Anemia  Status post Horse ATG 40mg/kg/day daily x 4 days (4/23- 4/26)  Continue Prednisone (taper to continue through 5/17)   CSA dose decreased to 500 mg twice daily. Recheck CSA level on 5/18  Promacta previously held for transaminitis, resumed on 5/12   Monitor CBC/Lytes and transfuse/replete PRN  Keep PLT>=20k for retinal hemorrhage(seen by ophthal 4/20)  Add phoslo and continue No concentrated phos diet due to mild hyperphosphatemia  Strict Is and Os/Daily weights/Mouth Care  PLT transfusion reaction during completion of transfusion with chest discomfort (pt initially refused benadryl pre-medication) and improved with administration of benadryl). EKG and CXR wnl.

## 2020-05-18 NOTE — PROGRESS NOTE ADULT - PROBLEM SELECTOR PLAN 6
will switch to Diflucan upon DC home   To send new pt e mail   FU meds coverage and delivery from [facility] will switch to Diflucan upon DC home   Pt has MD appt   [facility]meds sent to [facility], pending PA for Zofran, Carafate and cyclosporin

## 2020-05-18 NOTE — PROGRESS NOTE ADULT - PROBLEM SELECTOR PROBLEM 3
Subjective


Progress Note for:: 12/31/19


Subjective:: 


Patient is a 75 year old female with a past medical history of MI and PNA who 

was admitted 12/29/19 for left hip fracture and underwent hip arthroplasty by 

Dr. Grigsby today.





Patient was seen on afternoon rounds while preparing to work with physical 

therapy for the first time postoperatively.  She was lying in bed, comfortably, 

on room air.  She had no specific questions or concerns at the time of my visit,

though understandably distracted by physical therapy activity.


Denies chest pain, shortness breath, abdominal pain, nausea and vomiting.


No other questions or concerns at this time.


No concerns per nursing.





Reason For Visit: 


LEFT FEMORAL NECK FRACTURE








Physical Exam


Vital Signs: 


                                        











Temp Pulse Resp BP Pulse Ox


 


 98.4 F   79   15   139/74 H  91 L


 


 12/31/19 16:30  12/31/19 16:30  12/31/19 16:30  12/31/19 16:30  12/31/19 16:30








                                 Intake & Output











 12/30/19 12/31/19 01/01/20





 06:59 06:59 06:59


 


Intake Total   1867


 


Output Total 550 600 550


 


Balance -550 -600 1317


 


Weight 60.3 kg 60.3 kg 











General appearance: PRESENT: no acute distress, cooperative, well-developed, 

well-nourished


Head exam: PRESENT: atraumatic, normocephalic


Eye exam: PRESENT: conjunctiva pink, EOMI, PERRLA.  ABSENT: scleral icterus


Ear exam: PRESENT: normal external ear exam


Mouth exam: PRESENT: moist, tongue midline


Respiratory exam: PRESENT: clear to auscultation phoebe, symmetrical, unlabored.  

ABSENT: rales, rhonchi, wheezes


Cardiovascular exam: PRESENT: RRR, +S1, +S2.  ABSENT: diastolic murmur, rubs, 

systolic murmur


Pulses: PRESENT: normal dorsalis pedis pul


Vascular exam: PRESENT: normal capillary refill


GI/Abdominal exam: PRESENT: normal bowel sounds, soft.  ABSENT: distended, 

guarding, mass, organolmegaly, rebound, tenderness


Rectal exam: PRESENT: deferred


Extremities exam: ABSENT: calf tenderness, clubbing, full ROM - LUE to sling; 

limited LLE ROM r/t pain, pedal edema


Neurological exam: PRESENT: alert, awake, oriented to person, oriented to place,

oriented to time, oriented to situation, CN II-XII grossly intact.  ABSENT: 

motor sensory deficit


Psychiatric exam: PRESENT: appropriate affect, normal mood.  ABSENT: homicidal 

ideation, suicidal ideation


Skin exam: PRESENT: dry, warm.  ABSENT: cyanosis, intact - Lt hip post op 

dressing not visualized, rash





Results


Laboratory Results: 


                                        





                                 12/31/19 04:00 





                                 12/31/19 04:00 





                                        











  12/31/19 12/31/19





  04:00 04:00


 


WBC  10.0 


 


RBC  3.56 L 


 


Hgb  11.9 L 


 


Hct  34.9 L 


 


MCV  98 H 


 


MCH  33.4 


 


MCHC  34.1 


 


RDW  13.5 


 


Plt Count  239 


 


Sodium   138.0


 


Potassium   3.9


 


Chloride   103


 


Carbon Dioxide   28


 


Anion Gap   7


 


BUN   19


 


Creatinine   0.77


 


Est GFR (African Amer)   > 60


 


Glucose   107


 


Calcium   8.9


 


Total Bilirubin   0.6


 


AST   26


 


Alkaline Phosphatase   71


 


Total Protein   5.7 L


 


Albumin   3.4 L








                                        











  12/29/19 12/29/19





  23:56 23:56


 


Creatine Kinase  220 H 


 


Troponin I   < 0.012











Impressions: 


                                        





Chest X-Ray  12/29/19 23:13


IMPRESSION:


 


No acute cardiopulmonary process


 


 


copyright 2011 [employer]- All Rights Reserved


 








Shoulder X-Ray  12/30/19 01:20


IMPRESSION:


 


1. Interval reduction of the left shoulder dislocation.


2. Mildly displaced fracture through the greater tuberosity.


3. Mild diffuse bone demineralization. 


 








Fluoroscopy  12/31/19 00:00


IMPRESSION:  IMAGE(S) OBTAINED DURING PROCEDURE.


 








Hip X-Ray  12/31/19 00:00


IMPRESSION:  IMAGE(S) OBTAINED DURING PROCEDURE.


 








Pelvis X-Ray  12/31/19 00:00


IMPRESSION:   SATISFACTORY POSTOPERATIVE LEFT HIP.


 














Assessment and Plan





- Diagnosis


(1) Hip fracture, left


Qualifiers: 


   Encounter type: initial encounter   Fracture type: closed   Qualified 

Code(s): S72.002A - Fracture of unspecified part of neck of left femur, initial 

encounter for closed fracture   


Is this a current diagnosis for this admission?: Yes   


Plan: 


Now s/p left hip arthroplasty by Dr. Grigsby. 


Recommends full dose aspirin daily x6 weeks for DVT prophylaxis.


Analgesics as needed.


PT/OT consulted.


Discharge planning consulted.








(2) HLD (hyperlipidemia)


Is this a current diagnosis for this admission?: Yes   


Plan: 


Patient is placed on a regular diet.


Continue home dose statin therapy.








(3) Tobacco dependence


Is this a current diagnosis for this admission?: Yes   


Plan: 


Smoking cessation encouraged.


Nicotine replacement therapies provided.





Aggressive pulmonary toilet during the intraoperative period; IS and flutter 

valve to bedside.  As needed nebulizer treatments available.








(4) Shoulder fracture, left


Qualifiers: 


   Encounter type: initial encounter   Fracture type: closed   Qualified 

Code(s): S42.92XA - Fracture of left shoulder girdle, part unspecified, initial 

encounter for closed fracture   


Is this a current diagnosis for this admission?: Yes   


Plan: 


Primary management per orthopedic team.


Sling.


Analgesics as needed.








(5) Dislocated shoulder


Qualifiers: 


   Laterality: left 


Is this a current diagnosis for this admission?: Yes   


Plan: 


Status post reduction while in the emergency department.


Primary management per orthopedic team.


Analgesics as needed.








(6) Acute alcohol intoxication


Qualifiers: 


   Complication of substance-induced condition: uncomplicated   Qualified 

Code(s): F10.920 - Alcohol use, unspecified with intoxication, uncomplicated   


Is this a current diagnosis for this admission?: Yes   


Plan: 


At time of admission, serum alcohol level of 111.


Patient admits to occasional/social alcohol intake.  She denies alcohol 

dependence or history of withdrawal.





We will provide daily multivitamin, folic acid, and thiamine supplementation.


Monitor for evidence of alcohol dependence/withdrawal.








- Time


Time Spent with patient: Less than 15 minutes


Anticipated discharge: SNF


Within: when bed available Transaminitis

## 2020-05-19 DIAGNOSIS — I10 ESSENTIAL (PRIMARY) HYPERTENSION: ICD-10-CM

## 2020-05-19 LAB
ALBUMIN SERPL ELPH-MCNC: 3.7 G/DL — SIGNIFICANT CHANGE UP (ref 3.3–5)
ALP SERPL-CCNC: 127 U/L — HIGH (ref 40–120)
ALT FLD-CCNC: 145 U/L — HIGH (ref 10–45)
ANION GAP SERPL CALC-SCNC: 16 MMOL/L — SIGNIFICANT CHANGE UP (ref 5–17)
AST SERPL-CCNC: 41 U/L — HIGH (ref 10–40)
BASOPHILS # BLD AUTO: 0 K/UL — SIGNIFICANT CHANGE UP (ref 0–0.2)
BASOPHILS NFR BLD AUTO: 0 % — SIGNIFICANT CHANGE UP (ref 0–2)
BILIRUB DIRECT SERPL-MCNC: 1 MG/DL — HIGH (ref 0–0.2)
BILIRUB SERPL-MCNC: 2.6 MG/DL — HIGH (ref 0.2–1.2)
BUN SERPL-MCNC: 19 MG/DL — SIGNIFICANT CHANGE UP (ref 7–23)
CALCIUM SERPL-MCNC: 9.9 MG/DL — SIGNIFICANT CHANGE UP (ref 8.4–10.5)
CHLORIDE SERPL-SCNC: 101 MMOL/L — SIGNIFICANT CHANGE UP (ref 96–108)
CO2 SERPL-SCNC: 23 MMOL/L — SIGNIFICANT CHANGE UP (ref 22–31)
CREAT SERPL-MCNC: 0.99 MG/DL — SIGNIFICANT CHANGE UP (ref 0.5–1.3)
EOSINOPHIL # BLD AUTO: 0 K/UL — SIGNIFICANT CHANGE UP (ref 0–0.5)
EOSINOPHIL NFR BLD AUTO: 0 % — SIGNIFICANT CHANGE UP (ref 0–6)
GLUCOSE SERPL-MCNC: 89 MG/DL — SIGNIFICANT CHANGE UP (ref 70–99)
HCT VFR BLD CALC: 22.7 % — LOW (ref 39–50)
HGB BLD-MCNC: 7.9 G/DL — LOW (ref 13–17)
IMM GRANULOCYTES NFR BLD AUTO: 0.6 % — SIGNIFICANT CHANGE UP (ref 0–1.5)
LDH SERPL L TO P-CCNC: 188 U/L — SIGNIFICANT CHANGE UP (ref 50–242)
LYMPHOCYTES # BLD AUTO: 1.41 K/UL — SIGNIFICANT CHANGE UP (ref 1–3.3)
LYMPHOCYTES # BLD AUTO: 86 % — HIGH (ref 13–44)
MAGNESIUM SERPL-MCNC: 1.5 MG/DL — LOW (ref 1.6–2.6)
MCHC RBC-ENTMCNC: 29.5 PG — SIGNIFICANT CHANGE UP (ref 27–34)
MCHC RBC-ENTMCNC: 34.8 GM/DL — SIGNIFICANT CHANGE UP (ref 32–36)
MCV RBC AUTO: 84.7 FL — SIGNIFICANT CHANGE UP (ref 80–100)
MONOCYTES # BLD AUTO: 0.08 K/UL — SIGNIFICANT CHANGE UP (ref 0–0.9)
MONOCYTES NFR BLD AUTO: 4.9 % — SIGNIFICANT CHANGE UP (ref 2–14)
NEUTROPHILS # BLD AUTO: 0.14 K/UL — LOW (ref 1.8–7.4)
NEUTROPHILS NFR BLD AUTO: 8.5 % — LOW (ref 43–77)
NRBC # BLD: 0 /100 WBCS — SIGNIFICANT CHANGE UP (ref 0–0)
PHOSPHATE SERPL-MCNC: 5.5 MG/DL — HIGH (ref 2.5–4.5)
PLATELET # BLD AUTO: 32 K/UL — LOW (ref 150–400)
POTASSIUM SERPL-MCNC: 3.8 MMOL/L — SIGNIFICANT CHANGE UP (ref 3.5–5.3)
POTASSIUM SERPL-SCNC: 3.8 MMOL/L — SIGNIFICANT CHANGE UP (ref 3.5–5.3)
PROT SERPL-MCNC: 6.6 G/DL — SIGNIFICANT CHANGE UP (ref 6–8.3)
RBC # BLD: 2.68 M/UL — LOW (ref 4.2–5.8)
RBC # FLD: 13 % — SIGNIFICANT CHANGE UP (ref 10.3–14.5)
SODIUM SERPL-SCNC: 140 MMOL/L — SIGNIFICANT CHANGE UP (ref 135–145)
URATE SERPL-MCNC: 3.3 MG/DL — LOW (ref 3.4–8.8)
WBC # BLD: 1.64 K/UL — LOW (ref 3.8–10.5)
WBC # FLD AUTO: 1.64 K/UL — LOW (ref 3.8–10.5)

## 2020-05-19 PROCEDURE — 99232 SBSQ HOSP IP/OBS MODERATE 35: CPT | Mod: GC

## 2020-05-19 RX ORDER — MAGNESIUM SULFATE 500 MG/ML
2 VIAL (ML) INJECTION ONCE
Refills: 0 | Status: COMPLETED | OUTPATIENT
Start: 2020-05-19 | End: 2020-05-19

## 2020-05-19 RX ORDER — HYDROCHLOROTHIAZIDE 25 MG
25 TABLET ORAL DAILY
Refills: 0 | Status: DISCONTINUED | OUTPATIENT
Start: 2020-05-19 | End: 2020-05-20

## 2020-05-19 RX ORDER — HYDROCHLOROTHIAZIDE 25 MG
1 TABLET ORAL
Qty: 30 | Refills: 1
Start: 2020-05-19 | End: 2020-07-17

## 2020-05-19 RX ADMIN — Medication 667 MILLIGRAM(S): at 22:13

## 2020-05-19 RX ADMIN — Medication 25 MILLIGRAM(S): at 13:13

## 2020-05-19 RX ADMIN — Medication 1 GRAM(S): at 13:12

## 2020-05-19 RX ADMIN — Medication 5 MILLILITER(S): at 15:43

## 2020-05-19 RX ADMIN — Medication 400 MILLIGRAM(S): at 13:15

## 2020-05-19 RX ADMIN — Medication 667 MILLIGRAM(S): at 13:13

## 2020-05-19 RX ADMIN — Medication 5 MILLILITER(S): at 13:14

## 2020-05-19 RX ADMIN — ATOVAQUONE 750 MILLIGRAM(S): 750 SUSPENSION ORAL at 17:14

## 2020-05-19 RX ADMIN — Medication 5 MILLILITER(S): at 20:57

## 2020-05-19 RX ADMIN — CYCLOSPORINE 400 MILLIGRAM(S): 100 CAPSULE ORAL at 06:04

## 2020-05-19 RX ADMIN — Medication 400 MILLIGRAM(S): at 06:04

## 2020-05-19 RX ADMIN — FAMOTIDINE 20 MILLIGRAM(S): 10 INJECTION INTRAVENOUS at 17:15

## 2020-05-19 RX ADMIN — Medication 667 MILLIGRAM(S): at 07:56

## 2020-05-19 RX ADMIN — Medication 1 GRAM(S): at 06:04

## 2020-05-19 RX ADMIN — Medication 1 GRAM(S): at 23:28

## 2020-05-19 RX ADMIN — ATOVAQUONE 750 MILLIGRAM(S): 750 SUSPENSION ORAL at 06:04

## 2020-05-19 RX ADMIN — Medication 400 MILLIGRAM(S): at 22:13

## 2020-05-19 RX ADMIN — ELTROMBOPAG OLAMINE 150 MILLIGRAM(S): 50 TABLET, FILM COATED ORAL at 13:14

## 2020-05-19 RX ADMIN — Medication 5 MILLILITER(S): at 07:56

## 2020-05-19 RX ADMIN — Medication 667 MILLIGRAM(S): at 17:14

## 2020-05-19 RX ADMIN — FAMOTIDINE 20 MILLIGRAM(S): 10 INJECTION INTRAVENOUS at 06:04

## 2020-05-19 RX ADMIN — Medication 5 MILLILITER(S): at 00:10

## 2020-05-19 RX ADMIN — CYCLOSPORINE 400 MILLIGRAM(S): 100 CAPSULE ORAL at 17:14

## 2020-05-19 RX ADMIN — Medication 50 GRAM(S): at 09:35

## 2020-05-19 RX ADMIN — Medication 1 GRAM(S): at 17:15

## 2020-05-19 RX ADMIN — Medication 1 GRAM(S): at 00:09

## 2020-05-19 RX ADMIN — Medication 5 MILLILITER(S): at 23:28

## 2020-05-19 RX ADMIN — CASPOFUNGIN ACETATE 260 MILLIGRAM(S): 7 INJECTION, POWDER, LYOPHILIZED, FOR SOLUTION INTRAVENOUS at 07:56

## 2020-05-19 NOTE — PROGRESS NOTE ADULT - PROBLEM SELECTOR PLAN 1
4/20 BM Bx consistent with Aplastic Anemia  Status post Horse ATG 40mg/kg/day daily x 4 days (4/23- 4/26)  Continue Prednisone (taper to continue through 5/17)   CSA dose decreased to 500 mg twice daily. Recheck CSA level on 5/18  Promacta previously held for transaminitis, resumed on 5/12   Monitor CBC/Lytes and transfuse/replete PRN  Keep PLT>=20k for retinal hemorrhage(seen by ophthal 4/20)  Add phoslo and continue No concentrated phos diet due to mild hyperphosphatemia  Strict Is and Os/Daily weights/Mouth Care  PLT transfusion reaction during completion of transfusion with chest discomfort (pt initially refused benadryl pre-medication) and improved with administration of benadryl). EKG and CXR wnl. 4/20 BM Bx consistent with Aplastic Anemia  Status post Horse ATG 40mg/kg/day daily x 4 days (4/23- 4/26)  Continue Prednisone (taper to continue through 5/17)   CSA dose decreased to 400 mg twice daily. Recheck CSA level 5/20  Promacta previously held for transaminitis, resumed on 5/12   Monitor CBC/Lytes and transfuse/replete PRN  Keep PLT>=20k for retinal hemorrhage(seen by ophthal 4/20)  Add phoslo and continue No concentrated phos diet due to mild hyperphosphatemia  Strict Is and Os/Daily weights/Mouth Care  PLT transfusion reaction during completion of transfusion with chest discomfort (pt initially refused benadryl pre-medication) and improved with administration of benadryl). EKG and CXR wnl.

## 2020-05-19 NOTE — PROGRESS NOTE ADULT - PROBLEM SELECTOR PLAN 6
will switch to Diflucan upon DC home   Pt has MD appt   Allmeds sent to [facility], pending PA for Zofran, Carafate and cyclosporin will switch to Diflucan upon DC home   Pt has MD appt   Allmeds sent to [facility], pending PA for carafate which will be sent to pts home after d/c all other meds to be delivered 5/20 in afternoon.

## 2020-05-19 NOTE — PROGRESS NOTE ADULT - ATTENDING COMMENTS
35yo M with no PMH presented to OSH with dizziness x 2 weeks, easy bruising x 2-4 weeks. At home had a syncopal event and called 911, found to have hemoglobin 3's, Platelets 2K, no evidence of DIC. Diagnosed with AA on BM bx 4/20/20 -started horse ATG/Cyclosporine/Eltrombopag day +26    -receiving transfusions if Hgb< 7, Plt <50k   -optho consult given hazy vision, suspect retinal bleed given low platelets. Platelet transfusional support for goal >50k.   -Paranasal sinusitis on CT head(4/17)- cont Levaquin, begin Posaconazole; on Mepron and Acyclovir with immunosuppressive therapy.  -LFTs went up to grade 3 - switched posaconazole to caspofungin on 4/29 and when still up trending subsequently held promacta on 5/1. Ordered abdominal sono on 5/3 - normal.  Restarted Promacta on 5/12. Now AST grade 1, ALT grade 2  -hx gastritis -improved, on Pepcid and Carafate  -Cyclosporine level monitored q2-3 days, adjust level to keep therapeutic, due on 5/18 -f/u results  -OOB/ambulate 35yo M with no PMH presented to OSH with dizziness x 2 weeks, easy bruising x 2-4 weeks. At home had a syncopal event and called 911, found to have hemoglobin 3's, Platelets 2K, no evidence of DIC. Diagnosed with AA on BM bx 4/20/20 -started horse ATG/Cyclosporine/Eltrombopag day +27    -receiving transfusions if Hgb< 7. Changed plt parameters to give if less than 20k/ul. Pt had retinal bleed about 1 month ago -should f/u with optho  -Paranasal sinusitis on CT head(4/17)- cont Levaquin, on Mepron and Acyclovir with immunosuppressive therapy.  -LFTs went up to grade 3 - switched posaconazole to caspofungin on 4/29 and when still up trending subsequently held promacta on 5/1. Ordered abdominal sono on 5/3 - normal.  Restarted Promacta on 5/12. Now AST grade 1, ALT grade 2, down trending. Will d/c home on Fluconazole (less hepatotoxicity)  -hx gastritis -improved, on Pepcid and Carafate  -Cyclosporine level monitored q2-3 days, adjust level to keep therapeutic. High CSA level on 5/18, adjusted dose down to 400mg twice daily, repeat tomorrow  -OOB/ambulate

## 2020-05-19 NOTE — PROGRESS NOTE ADULT - PROBLEM SELECTOR PLAN 4
Intraretinal hemorrhage from thrombocytopenia and anemia  Transfuse to keep PLT >=20k   Opthalmology consultation appreciated. Will need to follow up as outpatient

## 2020-05-19 NOTE — PROGRESS NOTE ADULT - SUBJECTIVE AND OBJECTIVE BOX
Diagnosis: Aplastic Anemia     Protocol/Chemo Regimen: ATG/Cyclosporine/Prednisone/Promacta   (Promacta was held 5/1-5/11 d/t transaminitis)     Day: 27    Pt endorsed: mild nausea; mild abd pain     Review of Systems: Denies nausea, vomiting, diarrhea, chest pain, SOB     Pain scale: 3-4/10    Diet: Regular, no concentrated phos     Allergies    No Known Allergies    Intolerances        ANTIMICROBIALS  acyclovir   Oral Tab/Cap 400 milliGRAM(s) Oral every 8 hours  atovaquone Suspension 750 milliGRAM(s) Oral every 12 hours  caspofungin IVPB      caspofungin IVPB 50 milliGRAM(s) IV Intermittent every 24 hours  levoFLOXacin  Tablet 500 milliGRAM(s) Oral every 24 hours      HEME/ONC MEDICATIONS  eltrombopag 150 milliGRAM(s) Oral daily      STANDING MEDICATIONS  antithymocyte globulin equine Skin Test 0.1 milliLiter(s) IntraDermal once  Biotene Dry Mouth Oral Rinse 5 milliLiter(s) Swish and Spit five times a day  calcium acetate 667 milliGRAM(s) Oral four times a day with meals  cycloSPORINE  (SandIMMUNE) 400 milliGRAM(s) Oral every 12 hours  diphenhydrAMINE   Injectable 25 milliGRAM(s) IV Push once  famotidine    Tablet 20 milliGRAM(s) Oral two times a day  hydrocortisone sodium succinate Injectable 50 milliGRAM(s) IV Push once  sucralfate suspension 1 Gram(s) Oral every 6 hours      PRN MEDICATIONS  acetaminophen   Tablet .. 650 milliGRAM(s) Oral every 6 hours PRN  aluminum hydroxide/magnesium hydroxide/simethicone Suspension 30 milliLiter(s) Oral every 4 hours PRN  EPINEPHrine     1 mG/mL Injectable 0.3 milliGRAM(s) IntraMuscular once PRN  hydrocortisone sodium succinate Injectable 50 milliGRAM(s) IV Push every 12 hours PRN  loratadine 10 milliGRAM(s) Oral daily PRN  methylPREDNISolone sodium succinate IVPB 500 milliGRAM(s) IV Intermittent once PRN  ondansetron Injectable 4 milliGRAM(s) IV Push every 6 hours PRN        Vital Signs Last 24 Hrs  T(C): 36.2 (19 May 2020 04:55), Max: 36.6 (18 May 2020 14:30)  T(F): 97.1 (19 May 2020 04:55), Max: 97.9 (18 May 2020 14:30)  HR: 66 (19 May 2020 04:55) (66 - 96)  BP: 129/83 (19 May 2020 04:55) (129/83 - 158/98)  BP(mean): --  RR: 18 (19 May 2020 04:55) (18 - 183)  SpO2: 98% (19 May 2020 04:55) (96% - 99%)    PHYSICAL EXAM  General: adult in NAD  HEENT: clear oropharynx, no erythema, no ulcers  Neck: supple  CV: normal S1, S2, RRR  Lungs: clear to auscultation, no wheezes, no rales  Abdomen: soft, nontender, nondistended, +BS  Ext: no edema  Skin: no rash  Neuro: alert and oriented x 3  Peripheral  line: C/D/I     RECENT CULTURES:        LABS: Diagnosis: Aplastic Anemia     Protocol/Chemo Regimen: ATG/Cyclosporine/Prednisone/Promacta   (Promacta was held 5/1-5/11 d/t transaminitis)     Day: 27    Pt endorsed: no complaints.     Review of Systems: Denies nausea, vomiting, diarrhea, chest pain, SOB     Pain scale: Denies    Diet: Regular, no concentrated phos     Allergies    No Known Allergies    Intolerances        ANTIMICROBIALS  acyclovir   Oral Tab/Cap 400 milliGRAM(s) Oral every 8 hours  atovaquone Suspension 750 milliGRAM(s) Oral every 12 hours  caspofungin IVPB      caspofungin IVPB 50 milliGRAM(s) IV Intermittent every 24 hours  levoFLOXacin  Tablet 500 milliGRAM(s) Oral every 24 hours      HEME/ONC MEDICATIONS  eltrombopag 150 milliGRAM(s) Oral daily      STANDING MEDICATIONS  antithymocyte globulin equine Skin Test 0.1 milliLiter(s) IntraDermal once  Biotene Dry Mouth Oral Rinse 5 milliLiter(s) Swish and Spit five times a day  calcium acetate 667 milliGRAM(s) Oral four times a day with meals  cycloSPORINE  (SandIMMUNE) 400 milliGRAM(s) Oral every 12 hours  diphenhydrAMINE   Injectable 25 milliGRAM(s) IV Push once  famotidine    Tablet 20 milliGRAM(s) Oral two times a day  hydrocortisone sodium succinate Injectable 50 milliGRAM(s) IV Push once  sucralfate suspension 1 Gram(s) Oral every 6 hours      PRN MEDICATIONS  acetaminophen   Tablet .. 650 milliGRAM(s) Oral every 6 hours PRN  aluminum hydroxide/magnesium hydroxide/simethicone Suspension 30 milliLiter(s) Oral every 4 hours PRN  EPINEPHrine     1 mG/mL Injectable 0.3 milliGRAM(s) IntraMuscular once PRN  hydrocortisone sodium succinate Injectable 50 milliGRAM(s) IV Push every 12 hours PRN  loratadine 10 milliGRAM(s) Oral daily PRN  methylPREDNISolone sodium succinate IVPB 500 milliGRAM(s) IV Intermittent once PRN  ondansetron Injectable 4 milliGRAM(s) IV Push every 6 hours PRN        Vital Signs Last 24 Hrs  T(C): 36.2 (19 May 2020 04:55), Max: 36.6 (18 May 2020 14:30)  T(F): 97.1 (19 May 2020 04:55), Max: 97.9 (18 May 2020 14:30)  HR: 66 (19 May 2020 04:55) (66 - 96)  BP: 129/83 (19 May 2020 04:55) (129/83 - 158/98)  BP(mean): --  RR: 18 (19 May 2020 04:55) (18 - 183)  SpO2: 98% (19 May 2020 04:55) (96% - 99%)    PHYSICAL EXAM  General: adult in NAD  HEENT: clear oropharynx, no erythema, no ulcers  Neck: supple  CV: normal S1, S2, RRR  Lungs: clear to auscultation, no wheezes, no rales  Abdomen: soft, nontender, nondistended, +BS  Ext: no edema  Skin: no rash  Neuro: alert and oriented x 3  Peripheral  line: C/D/I     RECENT CULTURES:    LABS:    Blood Cultures:                           7.9    1.64  )-----------( 32       ( 19 May 2020 07:09 )             22.7         Mean Cell Volume : 84.7 fl  Mean Cell Hemoglobin : 29.5 pg  Mean Cell Hemoglobin Concentration : 34.8 gm/dL  Auto Neutrophil # : 0.14 K/uL  Auto Lymphocyte # : 1.41 K/uL  Auto Monocyte # : 0.08 K/uL  Auto Eosinophil # : 0.00 K/uL  Auto Basophil # : 0.00 K/uL  Auto Neutrophil % : 8.5 %  Auto Lymphocyte % : 86.0 %  Auto Monocyte % : 4.9 %  Auto Eosinophil % : 0.0 %  Auto Basophil % : 0.0 %      05-19    140  |  101  |  19  ----------------------------<  89  3.8   |  23  |  0.99    Ca    9.9      19 May 2020 07:09  Phos  5.5     05-19  Mg     1.5     05-19    TPro  6.6  /  Alb  3.7  /  TBili  2.6<H>  /  DBili  1.0<H>  /  AST  41<H>  /  ALT  145<H>  /  AlkPhos  127<H>  05-19      Mg 1.5  Phos 5.5      PT/INR - ( 18 May 2020 07:00 )   PT: 11.2 sec;   INR: 0.98 ratio         PTT - ( 18 May 2020 07:00 )  PTT:23.0 sec      Uric Acid 3.3

## 2020-05-20 ENCOUNTER — OUTPATIENT (OUTPATIENT)
Dept: OUTPATIENT SERVICES | Facility: HOSPITAL | Age: 35
LOS: 1 days | Discharge: HOME | End: 2020-05-20

## 2020-05-20 ENCOUNTER — TRANSCRIPTION ENCOUNTER (OUTPATIENT)
Age: 35
End: 2020-05-20

## 2020-05-20 VITALS
TEMPERATURE: 97 F | DIASTOLIC BLOOD PRESSURE: 76 MMHG | OXYGEN SATURATION: 97 % | HEART RATE: 97 BPM | RESPIRATION RATE: 18 BRPM | SYSTOLIC BLOOD PRESSURE: 130 MMHG

## 2020-05-20 DIAGNOSIS — I10 ESSENTIAL (PRIMARY) HYPERTENSION: ICD-10-CM

## 2020-05-20 DIAGNOSIS — Z98.890 OTHER SPECIFIED POSTPROCEDURAL STATES: Chronic | ICD-10-CM

## 2020-05-20 DIAGNOSIS — D64.9 ANEMIA, UNSPECIFIED: ICD-10-CM

## 2020-05-20 LAB
ALBUMIN SERPL ELPH-MCNC: 3.8 G/DL — SIGNIFICANT CHANGE UP (ref 3.3–5)
ALP SERPL-CCNC: 152 U/L — HIGH (ref 40–120)
ALT FLD-CCNC: 134 U/L — HIGH (ref 10–45)
ANION GAP SERPL CALC-SCNC: 16 MMOL/L — SIGNIFICANT CHANGE UP (ref 5–17)
AST SERPL-CCNC: 38 U/L — SIGNIFICANT CHANGE UP (ref 10–40)
BASOPHILS # BLD AUTO: 0 K/UL — SIGNIFICANT CHANGE UP (ref 0–0.2)
BASOPHILS NFR BLD AUTO: 0 % — SIGNIFICANT CHANGE UP (ref 0–2)
BILIRUB DIRECT SERPL-MCNC: 1.2 MG/DL — HIGH (ref 0–0.2)
BILIRUB SERPL-MCNC: 2.9 MG/DL — HIGH (ref 0.2–1.2)
BUN SERPL-MCNC: 20 MG/DL — SIGNIFICANT CHANGE UP (ref 7–23)
CALCIUM SERPL-MCNC: 9.7 MG/DL — SIGNIFICANT CHANGE UP (ref 8.4–10.5)
CHLORIDE SERPL-SCNC: 97 MMOL/L — SIGNIFICANT CHANGE UP (ref 96–108)
CO2 SERPL-SCNC: 24 MMOL/L — SIGNIFICANT CHANGE UP (ref 22–31)
CREAT SERPL-MCNC: 1.01 MG/DL — SIGNIFICANT CHANGE UP (ref 0.5–1.3)
CYCLOSPORINE SER-MCNC: 460 NG/ML — HIGH (ref 150–400)
EOSINOPHIL # BLD AUTO: 0 K/UL — SIGNIFICANT CHANGE UP (ref 0–0.5)
EOSINOPHIL NFR BLD AUTO: 0 % — SIGNIFICANT CHANGE UP (ref 0–6)
GLUCOSE SERPL-MCNC: 97 MG/DL — SIGNIFICANT CHANGE UP (ref 70–99)
HCT VFR BLD CALC: 24.1 % — LOW (ref 39–50)
HGB BLD-MCNC: 8.5 G/DL — LOW (ref 13–17)
LDH SERPL L TO P-CCNC: 209 U/L — SIGNIFICANT CHANGE UP (ref 50–242)
LYMPHOCYTES # BLD AUTO: 1.22 K/UL — SIGNIFICANT CHANGE UP (ref 1–3.3)
LYMPHOCYTES # BLD AUTO: 81.6 % — HIGH (ref 13–44)
MAGNESIUM SERPL-MCNC: 1.5 MG/DL — LOW (ref 1.6–2.6)
MCHC RBC-ENTMCNC: 29.7 PG — SIGNIFICANT CHANGE UP (ref 27–34)
MCHC RBC-ENTMCNC: 35.3 GM/DL — SIGNIFICANT CHANGE UP (ref 32–36)
MCV RBC AUTO: 84.3 FL — SIGNIFICANT CHANGE UP (ref 80–100)
MONOCYTES # BLD AUTO: 0.09 K/UL — SIGNIFICANT CHANGE UP (ref 0–0.9)
MONOCYTES NFR BLD AUTO: 5.8 % — SIGNIFICANT CHANGE UP (ref 2–14)
NEUTROPHILS # BLD AUTO: 0.19 K/UL — LOW (ref 1.8–7.4)
NEUTROPHILS NFR BLD AUTO: 12.6 % — LOW (ref 43–77)
PHOSPHATE SERPL-MCNC: 6.2 MG/DL — HIGH (ref 2.5–4.5)
PLATELET # BLD AUTO: 28 K/UL — LOW (ref 150–400)
POTASSIUM SERPL-MCNC: 3.8 MMOL/L — SIGNIFICANT CHANGE UP (ref 3.5–5.3)
POTASSIUM SERPL-SCNC: 3.8 MMOL/L — SIGNIFICANT CHANGE UP (ref 3.5–5.3)
PROT SERPL-MCNC: 6.8 G/DL — SIGNIFICANT CHANGE UP (ref 6–8.3)
RBC # BLD: 2.86 M/UL — LOW (ref 4.2–5.8)
RBC # FLD: 13 % — SIGNIFICANT CHANGE UP (ref 10.3–14.5)
SARS-COV-2 RNA SPEC QL NAA+PROBE: SIGNIFICANT CHANGE UP
SODIUM SERPL-SCNC: 137 MMOL/L — SIGNIFICANT CHANGE UP (ref 135–145)
URATE SERPL-MCNC: 3.4 MG/DL — SIGNIFICANT CHANGE UP (ref 3.4–8.8)
WBC # BLD: 1.49 K/UL — LOW (ref 3.8–10.5)
WBC # FLD AUTO: 1.49 K/UL — LOW (ref 3.8–10.5)

## 2020-05-20 PROCEDURE — C8929: CPT

## 2020-05-20 PROCEDURE — 84100 ASSAY OF PHOSPHORUS: CPT

## 2020-05-20 PROCEDURE — P9037: CPT

## 2020-05-20 PROCEDURE — 87799 DETECT AGENT NOS DNA QUANT: CPT

## 2020-05-20 PROCEDURE — 87046 STOOL CULTR AEROBIC BACT EA: CPT

## 2020-05-20 PROCEDURE — 81382 HLA II TYPING 1 LOC HR: CPT

## 2020-05-20 PROCEDURE — 88313 SPECIAL STAINS GROUP 2: CPT

## 2020-05-20 PROCEDURE — 82550 ASSAY OF CK (CPK): CPT

## 2020-05-20 PROCEDURE — P9040: CPT

## 2020-05-20 PROCEDURE — 85014 HEMATOCRIT: CPT

## 2020-05-20 PROCEDURE — 87635 SARS-COV-2 COVID-19 AMP PRB: CPT

## 2020-05-20 PROCEDURE — 86747 PARVOVIRUS ANTIBODY: CPT

## 2020-05-20 PROCEDURE — 86664 EPSTEIN-BARR NUCLEAR ANTIGEN: CPT

## 2020-05-20 PROCEDURE — 81379 HLA I TYPING COMPLETE HR: CPT

## 2020-05-20 PROCEDURE — 81206 BCR/ABL1 GENE MAJOR BP: CPT

## 2020-05-20 PROCEDURE — 82955 ASSAY OF G6PD ENZYME: CPT

## 2020-05-20 PROCEDURE — 84132 ASSAY OF SERUM POTASSIUM: CPT

## 2020-05-20 PROCEDURE — 80053 COMPREHEN METABOLIC PANEL: CPT

## 2020-05-20 PROCEDURE — 86706 HEP B SURFACE ANTIBODY: CPT

## 2020-05-20 PROCEDURE — 83605 ASSAY OF LACTIC ACID: CPT

## 2020-05-20 PROCEDURE — 76705 ECHO EXAM OF ABDOMEN: CPT

## 2020-05-20 PROCEDURE — 82747 ASSAY OF FOLIC ACID RBC: CPT

## 2020-05-20 PROCEDURE — 82272 OCCULT BLD FECES 1-3 TESTS: CPT

## 2020-05-20 PROCEDURE — 82803 BLOOD GASES ANY COMBINATION: CPT

## 2020-05-20 PROCEDURE — 84484 ASSAY OF TROPONIN QUANT: CPT

## 2020-05-20 PROCEDURE — 82553 CREATINE MB FRACTION: CPT

## 2020-05-20 PROCEDURE — 87389 HIV-1 AG W/HIV-1&-2 AB AG IA: CPT

## 2020-05-20 PROCEDURE — 86900 BLOOD TYPING SEROLOGIC ABO: CPT

## 2020-05-20 PROCEDURE — 87205 SMEAR GRAM STAIN: CPT

## 2020-05-20 PROCEDURE — 88342 IMHCHEM/IMCYTCHM 1ST ANTB: CPT

## 2020-05-20 PROCEDURE — 85379 FIBRIN DEGRADATION QUANT: CPT

## 2020-05-20 PROCEDURE — 88264 CHROMOSOME ANALYSIS 20-25: CPT

## 2020-05-20 PROCEDURE — 88305 TISSUE EXAM BY PATHOLOGIST: CPT

## 2020-05-20 PROCEDURE — 71045 X-RAY EXAM CHEST 1 VIEW: CPT

## 2020-05-20 PROCEDURE — 78472 GATED HEART PLANAR SINGLE: CPT

## 2020-05-20 PROCEDURE — 88237 TISSUE CULTURE BONE MARROW: CPT

## 2020-05-20 PROCEDURE — 85610 PROTHROMBIN TIME: CPT

## 2020-05-20 PROCEDURE — 94640 AIRWAY INHALATION TREATMENT: CPT

## 2020-05-20 PROCEDURE — 85049 AUTOMATED PLATELET COUNT: CPT

## 2020-05-20 PROCEDURE — 83735 ASSAY OF MAGNESIUM: CPT

## 2020-05-20 PROCEDURE — 82330 ASSAY OF CALCIUM: CPT

## 2020-05-20 PROCEDURE — 99231 SBSQ HOSP IP/OBS SF/LOW 25: CPT | Mod: GC

## 2020-05-20 PROCEDURE — 85384 FIBRINOGEN ACTIVITY: CPT

## 2020-05-20 PROCEDURE — 80074 ACUTE HEPATITIS PANEL: CPT

## 2020-05-20 PROCEDURE — 82947 ASSAY GLUCOSE BLOOD QUANT: CPT

## 2020-05-20 PROCEDURE — 85027 COMPLETE CBC AUTOMATED: CPT

## 2020-05-20 PROCEDURE — 81207 BCR/ABL1 GENE MINOR BP: CPT

## 2020-05-20 PROCEDURE — 36430 TRANSFUSION BLD/BLD COMPNT: CPT

## 2020-05-20 PROCEDURE — 88280 CHROMOSOME KARYOTYPE STUDY: CPT

## 2020-05-20 PROCEDURE — 83615 LACTATE (LD) (LDH) ENZYME: CPT

## 2020-05-20 PROCEDURE — 82435 ASSAY OF BLOOD CHLORIDE: CPT

## 2020-05-20 PROCEDURE — 88341 IMHCHEM/IMCYTCHM EA ADD ANTB: CPT

## 2020-05-20 PROCEDURE — 86923 COMPATIBILITY TEST ELECTRIC: CPT

## 2020-05-20 PROCEDURE — 86880 COOMBS TEST DIRECT: CPT

## 2020-05-20 PROCEDURE — 93005 ELECTROCARDIOGRAM TRACING: CPT

## 2020-05-20 PROCEDURE — 88360 TUMOR IMMUNOHISTOCHEM/MANUAL: CPT

## 2020-05-20 PROCEDURE — 86901 BLOOD TYPING SEROLOGIC RH(D): CPT

## 2020-05-20 PROCEDURE — A9560: CPT

## 2020-05-20 PROCEDURE — 81245 FLT3 GENE: CPT

## 2020-05-20 PROCEDURE — 84550 ASSAY OF BLOOD/URIC ACID: CPT

## 2020-05-20 PROCEDURE — 84295 ASSAY OF SERUM SODIUM: CPT

## 2020-05-20 PROCEDURE — 82248 BILIRUBIN DIRECT: CPT

## 2020-05-20 PROCEDURE — 82607 VITAMIN B-12: CPT

## 2020-05-20 PROCEDURE — 88185 FLOWCYTOMETRY/TC ADD-ON: CPT

## 2020-05-20 PROCEDURE — 87045 FECES CULTURE AEROBIC BACT: CPT

## 2020-05-20 PROCEDURE — 86038 ANTINUCLEAR ANTIBODIES: CPT

## 2020-05-20 PROCEDURE — 86663 EPSTEIN-BARR ANTIBODY: CPT

## 2020-05-20 PROCEDURE — 86850 RBC ANTIBODY SCREEN: CPT

## 2020-05-20 PROCEDURE — 80158 DRUG ASSAY CYCLOSPORINE: CPT

## 2020-05-20 PROCEDURE — 86665 EPSTEIN-BARR CAPSID VCA: CPT

## 2020-05-20 PROCEDURE — 86704 HEP B CORE ANTIBODY TOTAL: CPT

## 2020-05-20 PROCEDURE — 85730 THROMBOPLASTIN TIME PARTIAL: CPT

## 2020-05-20 PROCEDURE — 88365 INSITU HYBRIDIZATION (FISH): CPT

## 2020-05-20 PROCEDURE — 88184 FLOWCYTOMETRY/ TC 1 MARKER: CPT

## 2020-05-20 RX ORDER — CYCLOSPORINE 100 MG/1
3 CAPSULE ORAL
Qty: 0 | Refills: 0 | DISCHARGE
Start: 2020-05-20

## 2020-05-20 RX ORDER — CYCLOSPORINE 100 MG/1
300 CAPSULE ORAL EVERY 12 HOURS
Refills: 0 | Status: DISCONTINUED | OUTPATIENT
Start: 2020-05-20 | End: 2020-05-20

## 2020-05-20 RX ORDER — CALCIUM ACETATE 667 MG
2 TABLET ORAL
Qty: 16 | Refills: 0
Start: 2020-05-20 | End: 2020-05-23

## 2020-05-20 RX ORDER — MAGNESIUM SULFATE 500 MG/ML
2 VIAL (ML) INJECTION ONCE
Refills: 0 | Status: COMPLETED | OUTPATIENT
Start: 2020-05-20 | End: 2020-05-20

## 2020-05-20 RX ORDER — CALCIUM ACETATE 667 MG
1334 TABLET ORAL
Refills: 0 | Status: DISCONTINUED | OUTPATIENT
Start: 2020-05-20 | End: 2020-05-20

## 2020-05-20 RX ADMIN — Medication 667 MILLIGRAM(S): at 08:25

## 2020-05-20 RX ADMIN — Medication 5 MILLILITER(S): at 08:24

## 2020-05-20 RX ADMIN — ONDANSETRON 4 MILLIGRAM(S): 8 TABLET, FILM COATED ORAL at 08:57

## 2020-05-20 RX ADMIN — Medication 400 MILLIGRAM(S): at 13:08

## 2020-05-20 RX ADMIN — LORATADINE 10 MILLIGRAM(S): 10 TABLET ORAL at 11:30

## 2020-05-20 RX ADMIN — Medication 5 MILLILITER(S): at 11:30

## 2020-05-20 RX ADMIN — CYCLOSPORINE 400 MILLIGRAM(S): 100 CAPSULE ORAL at 05:23

## 2020-05-20 RX ADMIN — Medication 400 MILLIGRAM(S): at 05:23

## 2020-05-20 RX ADMIN — Medication 1 GRAM(S): at 11:30

## 2020-05-20 RX ADMIN — Medication 1334 MILLIGRAM(S): at 11:31

## 2020-05-20 RX ADMIN — CASPOFUNGIN ACETATE 260 MILLIGRAM(S): 7 INJECTION, POWDER, LYOPHILIZED, FOR SOLUTION INTRAVENOUS at 08:25

## 2020-05-20 RX ADMIN — FAMOTIDINE 20 MILLIGRAM(S): 10 INJECTION INTRAVENOUS at 05:23

## 2020-05-20 RX ADMIN — Medication 50 MILLIGRAM(S): at 11:30

## 2020-05-20 RX ADMIN — Medication 650 MILLIGRAM(S): at 11:29

## 2020-05-20 RX ADMIN — Medication 1 GRAM(S): at 05:24

## 2020-05-20 RX ADMIN — Medication 50 GRAM(S): at 10:14

## 2020-05-20 RX ADMIN — ELTROMBOPAG OLAMINE 150 MILLIGRAM(S): 50 TABLET, FILM COATED ORAL at 11:31

## 2020-05-20 RX ADMIN — ATOVAQUONE 750 MILLIGRAM(S): 750 SUSPENSION ORAL at 05:23

## 2020-05-20 RX ADMIN — Medication 25 MILLIGRAM(S): at 05:23

## 2020-05-20 NOTE — PROGRESS NOTE ADULT - SUBJECTIVE AND OBJECTIVE BOX
Diagnosis: Aplastic Anemia     Protocol/Chemo Regimen: ATG/Cyclosporine/Prednisone/Promacta   (Promacta was held 5/1-5/11 d/t transaminitis)     Day: 28    Pt endorsed: no complaints.     Review of Systems: Denies nausea, vomiting, diarrhea, chest pain, SOB     Pain scale: Denies    Diet: Regular, no concentrated phos     Allergies    No Known Allergies    Intolerances        ANTIMICROBIALS  acyclovir   Oral Tab/Cap 400 milliGRAM(s) Oral every 8 hours  atovaquone Suspension 750 milliGRAM(s) Oral every 12 hours  caspofungin IVPB      caspofungin IVPB 50 milliGRAM(s) IV Intermittent every 24 hours  levoFLOXacin  Tablet 500 milliGRAM(s) Oral every 24 hours      HEME/ONC MEDICATIONS  eltrombopag 150 milliGRAM(s) Oral daily      STANDING MEDICATIONS  antithymocyte globulin equine Skin Test 0.1 milliLiter(s) IntraDermal once  Biotene Dry Mouth Oral Rinse 5 milliLiter(s) Swish and Spit five times a day  calcium acetate 667 milliGRAM(s) Oral four times a day with meals  cycloSPORINE  (SandIMMUNE) 400 milliGRAM(s) Oral every 12 hours  diphenhydrAMINE   Injectable 25 milliGRAM(s) IV Push once  famotidine    Tablet 20 milliGRAM(s) Oral two times a day  hydrochlorothiazide 25 milliGRAM(s) Oral daily  hydrocortisone sodium succinate Injectable 50 milliGRAM(s) IV Push once  magnesium sulfate  IVPB 2 Gram(s) IV Intermittent once  sucralfate suspension 1 Gram(s) Oral every 6 hours      PRN MEDICATIONS  acetaminophen   Tablet .. 650 milliGRAM(s) Oral every 6 hours PRN  aluminum hydroxide/magnesium hydroxide/simethicone Suspension 30 milliLiter(s) Oral every 4 hours PRN  EPINEPHrine     1 mG/mL Injectable 0.3 milliGRAM(s) IntraMuscular once PRN  hydrocortisone sodium succinate Injectable 50 milliGRAM(s) IV Push every 12 hours PRN  loratadine 10 milliGRAM(s) Oral daily PRN  methylPREDNISolone sodium succinate IVPB 500 milliGRAM(s) IV Intermittent once PRN  ondansetron Injectable 4 milliGRAM(s) IV Push every 6 hours PRN        Vital Signs Last 24 Hrs  T(C): 36.4 (20 May 2020 05:22), Max: 36.6 (19 May 2020 17:00)  T(F): 97.6 (20 May 2020 05:22), Max: 97.9 (19 May 2020 17:00)  HR: 67 (20 May 2020 05:22) (67 - 94)  BP: 146/96 (20 May 2020 05:22) (132/90 - 150/92)  BP(mean): --  RR: 18 (20 May 2020 05:22) (18 - 20)  SpO2: 97% (20 May 2020 05:22) (97% - 100%)    PHYSICAL EXAM  General: NAD  HEENT:  clear oropharynx, +icteric sclera  CV: (+) S1/S2 RRR  Lungs: clear to auscultation, no wheezes or rales  Abdomen: soft, non-tender, non-distended (+) BS x 4Q  Ext: no edema  Skin: no rash  Neuro: alert and oriented X 3  Central Line: PIVL CDI           LABS:                        8.5    1.49  )-----------( 28       ( 20 May 2020 06:47 )             24.1         Mean Cell Volume : 84.3 fl  Mean Cell Hemoglobin : 29.7 pg  Mean Cell Hemoglobin Concentration : 35.3 gm/dL  Auto Neutrophil # : 0.19 K/uL  Auto Lymphocyte # : 1.22 K/uL  Auto Monocyte # : 0.09 K/uL  Auto Eosinophil # : 0.00 K/uL  Auto Basophil # : 0.00 K/uL  Auto Neutrophil % : 12.6 %  Auto Lymphocyte % : 81.6 %  Auto Monocyte % : 5.8 %  Auto Eosinophil % : 0.0 %  Auto Basophil % : 0.0 %      05-20    137  |  97  |  20  ----------------------------<  97  3.8   |  24  |  1.01    Ca    9.7      20 May 2020 06:47  Phos  6.2     05-20  Mg     1.5     05-20    TPro  6.8  /  Alb  3.8  /  TBili  2.9<H>  /  DBili  1.2<H>  /  AST  38  /  ALT  134<H>  /  AlkPhos  152<H>  05-20      Uric Acid 3.4      RADIOLOGY & ADDITIONAL STUDIES:  Xray Chest 1 View- PORTABLE-Urgent (05.16.20 @ 12:44)   IMPRESSION:   Clear lungs. Diagnosis: Aplastic Anemia     Protocol/Chemo Regimen: ATG/Cyclosporine/Prednisone/Promacta   (Promacta was held 5/1-5/11 d/t transaminitis)     Day: 28    Pt endorsed: Intermittent nausea    Review of Systems: Denies  vomiting, diarrhea, chest pain, SOB     Pain scale: Denies    Diet: Regular, no concentrated phos     Allergies    No Known Allergies    Intolerances        ANTIMICROBIALS  acyclovir   Oral Tab/Cap 400 milliGRAM(s) Oral every 8 hours  atovaquone Suspension 750 milliGRAM(s) Oral every 12 hours  caspofungin IVPB      caspofungin IVPB 50 milliGRAM(s) IV Intermittent every 24 hours  levoFLOXacin  Tablet 500 milliGRAM(s) Oral every 24 hours      HEME/ONC MEDICATIONS  eltrombopag 150 milliGRAM(s) Oral daily      STANDING MEDICATIONS  antithymocyte globulin equine Skin Test 0.1 milliLiter(s) IntraDermal once  Biotene Dry Mouth Oral Rinse 5 milliLiter(s) Swish and Spit five times a day  calcium acetate 667 milliGRAM(s) Oral four times a day with meals  cycloSPORINE  (SandIMMUNE) 400 milliGRAM(s) Oral every 12 hours  diphenhydrAMINE   Injectable 25 milliGRAM(s) IV Push once  famotidine    Tablet 20 milliGRAM(s) Oral two times a day  hydrochlorothiazide 25 milliGRAM(s) Oral daily  hydrocortisone sodium succinate Injectable 50 milliGRAM(s) IV Push once  magnesium sulfate  IVPB 2 Gram(s) IV Intermittent once  sucralfate suspension 1 Gram(s) Oral every 6 hours      PRN MEDICATIONS  acetaminophen   Tablet .. 650 milliGRAM(s) Oral every 6 hours PRN  aluminum hydroxide/magnesium hydroxide/simethicone Suspension 30 milliLiter(s) Oral every 4 hours PRN  EPINEPHrine     1 mG/mL Injectable 0.3 milliGRAM(s) IntraMuscular once PRN  hydrocortisone sodium succinate Injectable 50 milliGRAM(s) IV Push every 12 hours PRN  loratadine 10 milliGRAM(s) Oral daily PRN  methylPREDNISolone sodium succinate IVPB 500 milliGRAM(s) IV Intermittent once PRN  ondansetron Injectable 4 milliGRAM(s) IV Push every 6 hours PRN        Vital Signs Last 24 Hrs  T(C): 36.4 (20 May 2020 05:22), Max: 36.6 (19 May 2020 17:00)  T(F): 97.6 (20 May 2020 05:22), Max: 97.9 (19 May 2020 17:00)  HR: 67 (20 May 2020 05:22) (67 - 94)  BP: 146/96 (20 May 2020 05:22) (132/90 - 150/92)  BP(mean): --  RR: 18 (20 May 2020 05:22) (18 - 20)  SpO2: 97% (20 May 2020 05:22) (97% - 100%)    PHYSICAL EXAM  General: NAD  HEENT:  clear oropharynx, +icteric sclera  CV: (+) S1/S2 RRR  Lungs: clear to auscultation, no wheezes or rales  Abdomen: soft, non-tender, non-distended (+) BS x 4Q  Ext: no edema  Skin: no rash  Neuro: alert and oriented X 3  Central Line: PIVL CDI           LABS:                        8.5    1.49  )-----------( 28       ( 20 May 2020 06:47 )             24.1         Mean Cell Volume : 84.3 fl  Mean Cell Hemoglobin : 29.7 pg  Mean Cell Hemoglobin Concentration : 35.3 gm/dL  Auto Neutrophil # : 0.19 K/uL  Auto Lymphocyte # : 1.22 K/uL  Auto Monocyte # : 0.09 K/uL  Auto Eosinophil # : 0.00 K/uL  Auto Basophil # : 0.00 K/uL  Auto Neutrophil % : 12.6 %  Auto Lymphocyte % : 81.6 %  Auto Monocyte % : 5.8 %  Auto Eosinophil % : 0.0 %  Auto Basophil % : 0.0 %      05-20    137  |  97  |  20  ----------------------------<  97  3.8   |  24  |  1.01    Ca    9.7      20 May 2020 06:47  Phos  6.2     05-20  Mg     1.5     05-20    TPro  6.8  /  Alb  3.8  /  TBili  2.9<H>  /  DBili  1.2<H>  /  AST  38  /  ALT  134<H>  /  AlkPhos  152<H>  05-20      Uric Acid 3.4      RADIOLOGY & ADDITIONAL STUDIES:  Xray Chest 1 View- PORTABLE-Urgent (05.16.20 @ 12:44)   IMPRESSION:   Clear lungs.

## 2020-05-20 NOTE — PROGRESS NOTE ADULT - PROBLEM SELECTOR PLAN 1
4/20 BM Bx consistent with Aplastic Anemia  Status post Horse ATG 40mg/kg/day daily x 4 days (4/23- 4/26)  status post Prednisone (taper to continue through 5/17)   CSA dose decreased to 400 mg twice daily. Recheck CSA level 5/20  Promacta previously held for transaminitis, resumed on 5/12   Monitor CBC/Lytes and transfuse/replete PRN  Keep PLT>=20k for retinal hemorrhage(seen by ophthal 4/20)  Add phoslo and continue No concentrated phos diet due to mild hyperphosphatemia  Strict Is and Os/Daily weights/Mouth Care  PLT transfusion reaction during completion of transfusion with chest discomfort (pt initially refused benadryl pre-medication) and improved with administration of benadryl). EKG and CXR wnl. 4/20 BM Bx consistent with Aplastic Anemia  Status post Horse ATG 40mg/kg/day daily x 4 days (4/23- 4/26)  status post Prednisone (taper to continue through 5/17)   CSA dose decreased to 400 mg twice daily. Recheck CSA level 5/20  Promacta previously held for transaminitis, resumed on 5/12   Monitor CBC/Lytes and transfuse/replete PRN  Hypomagnesemia: Magnesium sulphate 2g IV x 1    Keep PLT>=20k for retinal hemorrhage(seen by ophthal 4/20)  Add phoslo and continue No concentrated phos diet due to mild hyperphosphatemia  Strict Is and Os/Daily weights/Mouth Care  PLT transfusion reaction during completion of transfusion with chest discomfort (pt initially refused benadryl pre-medication) and improved with administration of benadryl). EKG and CXR wnl.

## 2020-05-20 NOTE — PROGRESS NOTE ADULT - COVID-19 NEGATIVE LAB RESULT
COVID-19 ruled out

## 2020-05-20 NOTE — PROGRESS NOTE ADULT - PROBLEM SELECTOR PROBLEM 2
Infectious disease

## 2020-05-20 NOTE — PROGRESS NOTE ADULT - NSHPATTENDINGPLANDISCUSS_GEN_ALL_CORE
team

## 2020-05-20 NOTE — PROGRESS NOTE ADULT - PROBLEM SELECTOR PLAN 6
will switch to Diflucan upon DC home   Pt has MD appt   [facility]meds sent to [facility], pending [place] for carafate which will be sent to pts home after d/c all other meds to be delivered 5/20 in afternoon. Add HCTZ on 5/19 for mildly elevated BP likely due to CSA.

## 2020-05-20 NOTE — PROGRESS NOTE ADULT - PROBLEM SELECTOR PLAN 5
No pharmacologic  prophylaxis  due to thrombocytopenia  Encourage ambulation         Contact Information [phone]
No pharmacologic  prophylaxis  due to thrombocytopenia  Encourage ambulation         Contact Information [phone]
No pharmacologic  prophylaxis  due to thrombocytopenia        Contact Information [phone]
No pharmacologic  prophylaxis  due to thrombocytopenia        Contact Information [phone]
No pharmacologic  prophylaxis  due to thrombocytopenia        Contact Information [phone]
No pharmacologic  prophylaxis  due to thrombocytopenia        Contact Information [phone]
No pharmacologic  prophylaxis  due to thrombocytopenia        Contact Information [phone]
No pharmacologic  prophylaxis  due to thrombocytopenia  Encourage ambulation         Contact Information [phone]
No pharmacologic  prophylaxis  due to thrombocytopenia  Encourage ambulation         Contact Information [phone]
No pharmacologic  prophylaxis  due to thrombocytopenia  Encourage ambulation         Contact Information [phone]
No pharmacologic  prophylaxis  due to thrombocytopenia  Encourage ambulation         Contact Information [phone]
No pharmacologic  prophylaxis  due to thrombocytopenia  Encourage ambulation         Contact Information [phone]
No pharmacologic  prophylaxis  due to thrombocytopenia  Encourage ambulation         Contact Information [phone]
No pharmacologic  prophylaxis  due to thrombocytopenia  Encourage ambulation         Contact Information [phone]
No pharmacologic  prophylaxis  due to thrombocytopenia  Encourage ambulation         Contact Information [phone]
No pharmacologic  prophylaxis  due to thrombocytopenia  Encourage ambulation         Contact Information [phone]
No pharmacologic  prophylaxis  due to thrombocytopenia  Encourage ambulation         Contact Information [phone]
No pharmacologic  prophylaxis  due to thrombocytopenia  Encourage ambulation         Contact Information [phone]
No pharmacologic  prophylaxis  due to thrombocytopenia  Encourage ambulation         Contact Information [phone]
No pharmacologic  prophylaxis  due to thrombocytopenia  Encourage ambulation         Contact Information [phone]
No pharmacologic  prophylaxis  due to thrombocytopenia        Contact Information [phone]

## 2020-05-20 NOTE — PROGRESS NOTE ADULT - PROBLEM SELECTOR PLAN 7
Add HCTZ on 5/19 for mildly elevated BP likely due to CSA. will switch to Diflucan upon DC home   Pt has MD appt   All meds sent to [facility], pending PA for carafate which will be sent to pts home after d/c all other meds to be delivered 5/20 in afternoon.

## 2020-05-20 NOTE — PROGRESS NOTE ADULT - REASON FOR ADMISSION
dizziness

## 2020-05-20 NOTE — PHARMACOTHERAPY INTERVENTION NOTE - COMMENTS
Discussed discharge medication name, dose, frequency, and side effects. Medication information handout provided from Med Essential Fact Sheet ([employer]® [employer]®).

## 2020-05-20 NOTE — PROGRESS NOTE ADULT - ATTENDING COMMENTS
33yo M with no PMH presented to OSH with dizziness x 2 weeks, easy bruising x 2-4 weeks. At home had a syncopal event and called 911, found to have hemoglobin 3's, Platelets 2K, no evidence of DIC. Diagnosed with AA on BM bx 4/20/20 -started horse ATG/Cyclosporine/Eltrombopag day +27    -receiving transfusions if Hgb< 7. Changed plt parameters to give if less than 20k/ul. Pt had retinal bleed about 1 month ago -should f/u with optho  -Paranasal sinusitis on CT head(4/17)- cont Levaquin, on Mepron and Acyclovir with immunosuppressive therapy.  -LFTs went up to grade 3 - switched posaconazole to caspofungin on 4/29 and when still up trending subsequently held promacta on 5/1. Ordered abdominal sono on 5/3 - normal.  Restarted Promacta on 5/12. Now AST grade 1, ALT grade 2, down trending. Will d/c home on Fluconazole (less hepatotoxicity)  -hx gastritis -improved, on Pepcid and Carafate  -Cyclosporine level monitored q2-3 days, adjust level to keep therapeutic. High CSA level on 5/18, adjusted dose down to 400mg twice daily, repeat tomorrow  -OOB/ambulate 35yo M with no PMH presented to OSH with dizziness x 2 weeks, easy bruising x 2-4 weeks. At home had a syncopal event and called 911, found to have hemoglobin 3's, Platelets 2K, no evidence of DIC. Diagnosed with AA on BM bx 4/20/20 -started horse ATG/Cyclosporine/Eltrombopag day +28    -receiving transfusions if Hgb< 7. Changed plt parameters to give if less than 20k/ul. Pt had retinal bleed about 1 month ago -should f/u with optho  -Paranasal sinusitis on CT head(4/17)- cont Levaquin, on Mepron and Acyclovir with immunosuppressive therapy.  -LFTs went up to grade 3 - switched posaconazole to caspofungin on 4/29 and when still up trending subsequently held promacta on 5/1. Ordered abdominal sono on 5/3 - normal.  Restarted Promacta on 5/12. Now AST grade 1, ALT grade 2, down trending. Will d/c home on Fluconazole (less hepatotoxicity)  -hx gastritis -improved, on Pepcid and Carafate  -Cyclosporine level monitored q2-3 days, adjust level to keep therapeutic. High CSA level on 5/18, adjusted dose down to 400mg twice daily, repeat tomorrow  -OOB/ambulate  -d/c home today with close outpt f/u

## 2020-05-20 NOTE — DISCHARGE NOTE NURSING/CASE MANAGEMENT/SOCIAL WORK - PATIENT PORTAL LINK FT
You can access the FollowMyHealth Patient Portal offered by [facility] by registering at the following website: [email or web address]. By joining [employer]’s FollowMyHealth portal, you will also be able to view your health information using other applications (apps) compatible with our system.

## 2020-05-20 NOTE — DISCHARGE NOTE NURSING/CASE MANAGEMENT/SOCIAL WORK - NSDCFUADDAPPT_GEN_ALL_CORE_FT
Follow-Up:  Patient should follow up his/her ophthalmologist or in the [facility] within one week  [address] [facility]. [address] [place] [id number]  [phone]    You will have your Cyclosporin level/phos  checked on Saturday 5/23, DON"T take your morning CYCLOSPORIN dose; bring your morning dose with you and follow up your results with Dr Goldberg.     You have a telehealth appointment with Dr Goldberg on Thursday 5/28/2- at 3pm  You are scheduled for possible platelet transfusion appointments as followed:  Saturday 5/23 at 1pm  Tuesday 5/26 at 3:30pm  Friday 5/29 at 3:30 pm

## 2020-05-22 ENCOUNTER — OUTPATIENT (OUTPATIENT)
Dept: OUTPATIENT SERVICES | Facility: HOSPITAL | Age: 35
LOS: 1 days | End: 2020-05-22
Payer: COMMERCIAL

## 2020-05-22 DIAGNOSIS — Z98.890 OTHER SPECIFIED POSTPROCEDURAL STATES: Chronic | ICD-10-CM

## 2020-05-22 DIAGNOSIS — D64.9 ANEMIA, UNSPECIFIED: ICD-10-CM

## 2020-05-23 ENCOUNTER — APPOINTMENT (OUTPATIENT)
Dept: INFUSION THERAPY | Facility: HOSPITAL | Age: 35
End: 2020-05-23

## 2020-05-23 ENCOUNTER — RESULT REVIEW (OUTPATIENT)
Age: 35
End: 2020-05-23

## 2020-05-23 LAB
BASOPHILS # BLD AUTO: 0 K/UL — SIGNIFICANT CHANGE UP (ref 0–0.2)
BASOPHILS NFR BLD AUTO: 0 % — SIGNIFICANT CHANGE UP (ref 0–2)
EOSINOPHIL # BLD AUTO: 0 K/UL — SIGNIFICANT CHANGE UP (ref 0–0.5)
EOSINOPHIL NFR BLD AUTO: 0 % — SIGNIFICANT CHANGE UP (ref 0–6)
HCT VFR BLD CALC: 21 % — CRITICAL LOW (ref 39–50)
HGB BLD-MCNC: 7.7 G/DL — LOW (ref 13–17)
LYMPHOCYTES # BLD AUTO: 1.39 K/UL — SIGNIFICANT CHANGE UP (ref 1–3.3)
LYMPHOCYTES # BLD AUTO: 81 % — HIGH (ref 13–44)
MCHC RBC-ENTMCNC: 31.4 PG — SIGNIFICANT CHANGE UP (ref 27–34)
MCHC RBC-ENTMCNC: 36.7 GM/DL — HIGH (ref 32–36)
MCV RBC AUTO: 85.7 FL — SIGNIFICANT CHANGE UP (ref 80–100)
MONOCYTES # BLD AUTO: 0.1 K/UL — SIGNIFICANT CHANGE UP (ref 0–0.9)
MONOCYTES NFR BLD AUTO: 6 % — SIGNIFICANT CHANGE UP (ref 2–14)
NEUTROPHILS # BLD AUTO: 0.22 K/UL — LOW (ref 1.8–7.4)
NEUTROPHILS NFR BLD AUTO: 11 % — LOW (ref 43–77)
NEUTS BAND # BLD: 2 % — SIGNIFICANT CHANGE UP (ref 0–8)
NRBC # BLD: 1 /100 — HIGH (ref 0–0)
NRBC # BLD: SIGNIFICANT CHANGE UP /100 WBCS (ref 0–0)
PLAT MORPH BLD: NORMAL — SIGNIFICANT CHANGE UP
PLATELET # BLD AUTO: 37 K/UL — LOW (ref 150–400)
RBC # BLD: 2.45 M/UL — LOW (ref 4.2–5.8)
RBC # FLD: 13.2 % — SIGNIFICANT CHANGE UP (ref 10.3–14.5)
RBC BLD AUTO: SIGNIFICANT CHANGE UP
SMUDGE CELLS # BLD: PRESENT — SIGNIFICANT CHANGE UP
WBC # BLD: 1.71 K/UL — LOW (ref 3.8–10.5)
WBC # FLD AUTO: 1.71 K/UL — LOW (ref 3.8–10.5)

## 2020-05-26 ENCOUNTER — APPOINTMENT (OUTPATIENT)
Dept: INFUSION THERAPY | Facility: HOSPITAL | Age: 35
End: 2020-05-26

## 2020-05-28 ENCOUNTER — APPOINTMENT (OUTPATIENT)
Dept: HEMATOLOGY ONCOLOGY | Facility: CLINIC | Age: 35
End: 2020-05-28
Payer: MEDICAID

## 2020-05-28 DIAGNOSIS — Z78.9 OTHER SPECIFIED HEALTH STATUS: ICD-10-CM

## 2020-05-28 PROCEDURE — 99205 OFFICE O/P NEW HI 60 MIN: CPT | Mod: 95

## 2020-05-28 NOTE — RESULTS/DATA
[FreeTextEntry1] : Labs on presentation:\par WBC 3.29\par Hb 3.6\par Hct 9.9\par Plt 2\par Abs Retic 6.8\par  (dropped to 70 the next day) \par \par \par ECHO 4/20/2020\par Conclusions:\par 1. Normal mitral valve. Minimal mitral regurgitation.\par 2. Normal trileaflet aortic valve. No aortic valve\par regurgitation seen.\par 3. Endocardial visualization enhanced with intravenous\par injection of Ultrasonic Enhancing Agent (Definity). Normal\par left ventricular systolic function. No segmental wall\par motion abnormalities. No left ventricular thrombus.\par 4. Normal diastolic function\par 5. Normal right ventricular size and function.\par *** No previous Echo exam.

## 2020-05-28 NOTE — ASSESSMENT
[FreeTextEntry1] : 33 y/o M with recent diagnosis of Aplastic anemia (very severe AA) confirmed on bone marrow biopsy at [facility] and now s/p inpatient treatment beginning 4/23/2020 with horse ATG + CsA + promacta, now following up to establish outpatient care. \par \par -Patient will come to treatment room tomorrow for possible platelet appointment, suspect he may need platelet transfusion given gum bleeding. \par -Patient with Hb 7.7 last Saturday 4/23, will plan for blood transfusion this Saturday. Will need consent and T&S tomorrow. \par -Currently on CsA 300 mg BID, will check trough level tomorrow AM with platelet appointment directly before AM dose (~10 AM). Goal trough is 200-400. \par -Will check CMP as well tomorrow. \par -Will re-prescribe sucralfate. \par -Plan will be for transfusional and antibiotic support for the time being. He is still neutropenic and on prophylaxis with Levaquin and diflucan. CsA and Promacta will be planned for 6 months, followed by a taper based on response. \par -Patient instructed to report to ER if any fever over 100.3. He understands and agrees to this. Instructed patient on COVID19 precautions, and educated on proper hand washing and social distancing. \par - All questions answered to the best of my ability and to the patient's apparent satisfaction.\par \par The visit was completed via tele-medicine visit due to the restrictions of the COVID-19 pandemic. All issues as above were discussed and addressed but no physical exam was performed. If it was felt that the patient should be evaluated in the clinic then they were directed there. The patient verbally consented to visit.\par

## 2020-05-28 NOTE — HISTORY OF PRESENT ILLNESS
[Disease:__________________________] : Disease: [unfilled] [Therapy: ___] : Therapy: [unfilled] [Home] : at home, [unfilled] , at the time of the visit. [Medical Office: ([facility])___] : at the medical office located in  [de-identified] : 35 y/o M with no PMH presented to OSH originally for c/o dizziness and feeling unwell and was then transferred to [facility] for further hematologic evaluation as patient was found to be severely pancytopenic. Patient first started noticing scattered bruising throughout his body (mostly thighs and arms) about 6 weeks prior to presentation, and also had epistaxis. Pt had also noticed intermittent fevers and chills, responsive to tylenol, and noticed very dark stools for the same time period. Also with spontaneous gum bleeding and endorsed severe fatigue and SOB with exertion. Also noted 15 lbs weight loss, partially intentional over past 2-3 months prior to presentation.  Also c/o mild headache located in posterior aspect of his head, denies sore throat, cough, +nausea but no vomiting, no abdominal pain, no diarrhea, no BRBPR, no urinary symptoms, no LE swelling, +visual changes.     \par \par Patient was transferred to [facility] from [facility] on 4/18. Patient was started on IVF, Levaquin, and allopurinol. Patient transfused blood and platelets. Bone Marrow biopsy performed on 4/20 consistent with Aplastic Anemia. 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 Because of this, patient's platelet transfusion threshold was made 50k. Horse ATG test dose was applied 4/22 with no reaction. Treatment started 4/23 consisting of equine ATG at 40mg/kg/day x 4 days, Cyclosporine 10mg/kg/day divided doses BID, Eltrombopag 150mg daily. Patient had episode of hives with ATG infusion on Day 1 thus steroid regimen was changed from oral prednisone to IV Solumedrol Q8 for the duration of ATG treatment and transitioned back to oral prednisone 1mg/kg daily for days 5-10 then tapered. The patient had grade 3 transaminitis likely from Promacta and ATG and Promacta was held as of 5/1. While patient was in the hospital, his course was also complicated by epigastric pain consistent with dyspepsia, most likely due to cyclosporine. Was managed on pepcid BID and sucralfate q6 hours and mylanta as needed with good relief. \par \par On 5/12 the patient's transaminitis had improved(grade 2 total bili, grade 1 AST, grade 2 ALT) and Promacta 150 mg po QD was resumed. Pt's cyclosporine dose was adjusted  according to the level (goal 200-400) and he was on 400 mg po q12h as of 5/18. On 5/16, patient developed chest discomfort during platelet transfusion which improved with benadryl. EKG and CXR were wnl. Patient originally refused benadryl pre-medication prior to platelets that day due to side effects with taking Benadryl, and it was decided to premedicate pt with Claritin or Zyrtec pre blood transfusion instead. On 5/20, cyclosporine level was 460, therefore dose was lowered to 300 mg PO BID. Patient's vision had improved, so platelet transfusion threshold liberalized to 20k. \par  [Verbal consent obtained from patient] : the patient, [unfilled] [de-identified] : - Markedly hypocellular bone marrow (mostly 5% and focal 10-\par 15%) with focal minimal erythropoiesis, minimal to absent\par myelopoiesis, absent megakaryocytes, increased iron stores. Aplastic bone marrow.  [de-identified] : very severe Aplastic Anemia [de-identified] : Cytogenetics: \par \par Result: Male karyotype\par Karyotype: 46,XY {12 } [de-identified] : Since patient had been home he reports that he feels OK, only his epigastric pain is back. He notices that he feels like he is hungry even after eating a meal. This improves slightly with Tylenol. He has not been able to obtain sucralfate since discharge. Otherwise he does complain of a sharp infrequent R sided chest pain without palpitations or dyspnea, which improves after Tylenol. He does not recall if it is reproducible. It typically lasts 1 hour. He denies fatigue or dizziness, however he has had the recurrence of some mild gum bleeding, mainly after brushing teeth and first thing in the morning. His appetite is OK, but he is slightly constipated. He reports some nausea managed with Zofran, but no vomiting. Denies fevers and chills.   [FreeTextEntry1] : Therapy initiated 4/23/2020

## 2020-05-28 NOTE — REVIEW OF SYSTEMS
[Fever] : no fever [Fatigue] : no fatigue [Night Sweats] : no night sweats [Chills] : no chills [Vision Problems] : vision problems [Eye Pain] : no eye pain [Nosebleeds] : no nosebleeds [Dysphagia] : no dysphagia [Odynophagia] : no odynophagia [Chest Pain] : chest pain [Palpitations] : no palpitations [Lower Ext Edema] : no lower extremity edema [Wheezing] : no wheezing [Shortness Of Breath] : no shortness of breath [Cough] : no cough [SOB on Exertion] : no shortness of breath during exertion [Vomiting] : no vomiting [Abdominal Pain] : no abdominal pain [Constipation] : constipation [Joint Pain] : no joint pain [Dysuria] : no dysuria [Joint Stiffness] : no joint stiffness [Skin Rash] : no skin rash [Dizziness] : no dizziness [Fainting] : no fainting [Anxiety] : no anxiety [Depression] : no depression [Easy Bleeding] : a tendency for easy bleeding [Easy Bruising] : a tendency for easy bruising [FreeTextEntry3] : still not at 100%, but vision improved significantly.  [FreeTextEntry4] : gum bleeding as per interval history.  [FreeTextEntry5] : atypical pattern of chest pain.  [FreeTextEntry7] : +nausea

## 2020-05-28 NOTE — PHYSICAL EXAM
[de-identified] : Cannot perform physical exam due to nature of telemedicine visit, however on telemedicine patient appears to not be in any acute distress

## 2020-05-29 ENCOUNTER — RESULT REVIEW (OUTPATIENT)
Age: 35
End: 2020-05-29

## 2020-05-29 ENCOUNTER — APPOINTMENT (OUTPATIENT)
Dept: HEMATOLOGY ONCOLOGY | Facility: CLINIC | Age: 35
End: 2020-05-29

## 2020-05-29 ENCOUNTER — APPOINTMENT (OUTPATIENT)
Dept: INFUSION THERAPY | Facility: HOSPITAL | Age: 35
End: 2020-05-29

## 2020-05-29 LAB
BASOPHILS # BLD AUTO: 0 K/UL — SIGNIFICANT CHANGE UP (ref 0–0.2)
BASOPHILS NFR BLD AUTO: 0 % — SIGNIFICANT CHANGE UP (ref 0–2)
EOSINOPHIL # BLD AUTO: 0 K/UL — SIGNIFICANT CHANGE UP (ref 0–0.5)
EOSINOPHIL NFR BLD AUTO: 0 % — SIGNIFICANT CHANGE UP (ref 0–6)
HCT VFR BLD CALC: 20.6 % — CRITICAL LOW (ref 39–50)
HGB BLD-MCNC: 7.4 G/DL — LOW (ref 13–17)
LYMPHOCYTES # BLD AUTO: 0.9 K/UL — LOW (ref 1–3.3)
LYMPHOCYTES # BLD AUTO: 61 % — HIGH (ref 13–44)
MCHC RBC-ENTMCNC: 30.8 PG — SIGNIFICANT CHANGE UP (ref 27–34)
MCHC RBC-ENTMCNC: 35.9 GM/DL — SIGNIFICANT CHANGE UP (ref 32–36)
MCV RBC AUTO: 85.8 FL — SIGNIFICANT CHANGE UP (ref 80–100)
MONOCYTES # BLD AUTO: 0.24 K/UL — SIGNIFICANT CHANGE UP (ref 0–0.9)
MONOCYTES NFR BLD AUTO: 16 % — HIGH (ref 2–14)
NEUTROPHILS # BLD AUTO: 0.34 K/UL — LOW (ref 1.8–7.4)
NEUTROPHILS NFR BLD AUTO: 23 % — LOW (ref 43–77)
NRBC # BLD: 0 /100 — SIGNIFICANT CHANGE UP (ref 0–0)
NRBC # BLD: SIGNIFICANT CHANGE UP /100 WBCS (ref 0–0)
PLAT MORPH BLD: NORMAL — SIGNIFICANT CHANGE UP
PLATELET # BLD AUTO: 23 K/UL — LOW (ref 150–400)
RBC # BLD: 2.4 M/UL — LOW (ref 4.2–5.8)
RBC # FLD: 13.2 % — SIGNIFICANT CHANGE UP (ref 10.3–14.5)
RBC BLD AUTO: SIGNIFICANT CHANGE UP
WBC # BLD: 1.48 K/UL — LOW (ref 3.8–10.5)
WBC # FLD AUTO: 1.48 K/UL — LOW (ref 3.8–10.5)

## 2020-05-29 PROCEDURE — 86923 COMPATIBILITY TEST ELECTRIC: CPT

## 2020-05-29 PROCEDURE — 86900 BLOOD TYPING SEROLOGIC ABO: CPT

## 2020-05-29 PROCEDURE — 86850 RBC ANTIBODY SCREEN: CPT

## 2020-05-29 PROCEDURE — 86901 BLOOD TYPING SEROLOGIC RH(D): CPT

## 2020-05-29 RX ORDER — SUCRALFATE 1 G/1
1 TABLET ORAL 4 TIMES DAILY
Qty: 1 | Refills: 0 | Status: DISCONTINUED | COMMUNITY
Start: 2020-05-28 | End: 2020-05-29

## 2020-05-30 ENCOUNTER — APPOINTMENT (OUTPATIENT)
Dept: INFUSION THERAPY | Facility: HOSPITAL | Age: 35
End: 2020-05-30

## 2020-06-01 DIAGNOSIS — R11.2 NAUSEA WITH VOMITING, UNSPECIFIED: ICD-10-CM

## 2020-06-01 DIAGNOSIS — Z51.11 ENCOUNTER FOR ANTINEOPLASTIC CHEMOTHERAPY: ICD-10-CM

## 2020-06-01 LAB
ALBUMIN SERPL ELPH-MCNC: 4.4 G/DL
ALP BLD-CCNC: 147 U/L
ALT SERPL-CCNC: 31 U/L
ANION GAP SERPL CALC-SCNC: 15 MMOL/L
AST SERPL-CCNC: 14 U/L
BILIRUB SERPL-MCNC: 1.2 MG/DL
BUN SERPL-MCNC: 28 MG/DL
CALCIUM SERPL-MCNC: 9.5 MG/DL
CHLORIDE SERPL-SCNC: 101 MMOL/L
CO2 SERPL-SCNC: 23 MMOL/L
CREAT SERPL-MCNC: 1.53 MG/DL
CYCLOSPORINE SER-MCNC: 366 NG/ML
GLUCOSE SERPL-MCNC: 126 MG/DL
PHOSPHATE SERPL-MCNC: 5.1 MG/DL
POTASSIUM SERPL-SCNC: 4.4 MMOL/L
PROT SERPL-MCNC: 7.3 G/DL
SODIUM SERPL-SCNC: 139 MMOL/L

## 2020-06-03 ENCOUNTER — APPOINTMENT (OUTPATIENT)
Dept: INFUSION THERAPY | Facility: HOSPITAL | Age: 35
End: 2020-06-03

## 2020-06-03 ENCOUNTER — RESULT REVIEW (OUTPATIENT)
Age: 35
End: 2020-06-03

## 2020-06-03 LAB
BASOPHILS # BLD AUTO: 0 K/UL — SIGNIFICANT CHANGE UP (ref 0–0.2)
BASOPHILS NFR BLD AUTO: 0 % — SIGNIFICANT CHANGE UP (ref 0–2)
EOSINOPHIL # BLD AUTO: 0 K/UL — SIGNIFICANT CHANGE UP (ref 0–0.5)
EOSINOPHIL NFR BLD AUTO: 0 % — SIGNIFICANT CHANGE UP (ref 0–6)
HCT VFR BLD CALC: 22 % — LOW (ref 39–50)
HGB BLD-MCNC: 8.1 G/DL — LOW (ref 13–17)
LYMPHOCYTES # BLD AUTO: 1.08 K/UL — SIGNIFICANT CHANGE UP (ref 1–3.3)
LYMPHOCYTES # BLD AUTO: 44 % — SIGNIFICANT CHANGE UP (ref 13–44)
MCHC RBC-ENTMCNC: 31.3 PG — SIGNIFICANT CHANGE UP (ref 27–34)
MCHC RBC-ENTMCNC: 36.2 GM/DL — HIGH (ref 32–36)
MCV RBC AUTO: 86.5 FL — SIGNIFICANT CHANGE UP (ref 80–100)
MONOCYTES # BLD AUTO: 0.64 K/UL — SIGNIFICANT CHANGE UP (ref 0–0.9)
MONOCYTES NFR BLD AUTO: 26 % — HIGH (ref 2–14)
NEUTROPHILS # BLD AUTO: 0.74 K/UL — LOW (ref 1.8–7.4)
NEUTROPHILS NFR BLD AUTO: 30 % — LOW (ref 43–77)
NRBC # BLD: 0 /100 — SIGNIFICANT CHANGE UP (ref 0–0)
NRBC # BLD: SIGNIFICANT CHANGE UP /100 WBCS (ref 0–0)
PLAT MORPH BLD: NORMAL — SIGNIFICANT CHANGE UP
PLATELET # BLD AUTO: 35 K/UL — LOW (ref 150–400)
RBC # BLD: 2.59 M/UL — LOW (ref 4.2–5.8)
RBC # FLD: 14.8 % — HIGH (ref 10.3–14.5)
RBC BLD AUTO: SIGNIFICANT CHANGE UP
WBC # BLD: 2.46 K/UL — LOW (ref 3.8–10.5)
WBC # FLD AUTO: 2.46 K/UL — LOW (ref 3.8–10.5)

## 2020-06-05 ENCOUNTER — APPOINTMENT (OUTPATIENT)
Dept: INFUSION THERAPY | Facility: HOSPITAL | Age: 35
End: 2020-06-05

## 2020-06-05 ENCOUNTER — APPOINTMENT (OUTPATIENT)
Dept: HEMATOLOGY ONCOLOGY | Facility: CLINIC | Age: 35
End: 2020-06-05

## 2020-06-05 ENCOUNTER — RESULT REVIEW (OUTPATIENT)
Age: 35
End: 2020-06-05

## 2020-06-05 ENCOUNTER — LABORATORY RESULT (OUTPATIENT)
Age: 35
End: 2020-06-05

## 2020-06-05 LAB
ANISOCYTOSIS BLD QL: SLIGHT — SIGNIFICANT CHANGE UP
BASOPHILS # BLD AUTO: 0 K/UL — SIGNIFICANT CHANGE UP (ref 0–0.2)
BASOPHILS NFR BLD AUTO: 0 % — SIGNIFICANT CHANGE UP (ref 0–2)
DACRYOCYTES BLD QL SMEAR: SLIGHT — SIGNIFICANT CHANGE UP
EOSINOPHIL # BLD AUTO: 0 K/UL — SIGNIFICANT CHANGE UP (ref 0–0.5)
EOSINOPHIL NFR BLD AUTO: 0 % — SIGNIFICANT CHANGE UP (ref 0–6)
HCT VFR BLD CALC: 21.7 % — LOW (ref 39–50)
HGB BLD-MCNC: 7.9 G/DL — LOW (ref 13–17)
LYMPHOCYTES # BLD AUTO: 1.1 K/UL — SIGNIFICANT CHANGE UP (ref 1–3.3)
LYMPHOCYTES # BLD AUTO: 44 % — SIGNIFICANT CHANGE UP (ref 13–44)
MCHC RBC-ENTMCNC: 31.7 PG — SIGNIFICANT CHANGE UP (ref 27–34)
MCHC RBC-ENTMCNC: 36.4 GM/DL — HIGH (ref 32–36)
MCV RBC AUTO: 87.1 FL — SIGNIFICANT CHANGE UP (ref 80–100)
MONOCYTES # BLD AUTO: 0.65 K/UL — SIGNIFICANT CHANGE UP (ref 0–0.9)
MONOCYTES NFR BLD AUTO: 26 % — HIGH (ref 2–14)
NEUTROPHILS # BLD AUTO: 0.75 K/UL — LOW (ref 1.8–7.4)
NEUTROPHILS NFR BLD AUTO: 30 % — LOW (ref 43–77)
NRBC # BLD: 0 /100 — SIGNIFICANT CHANGE UP (ref 0–0)
NRBC # BLD: SIGNIFICANT CHANGE UP /100 WBCS (ref 0–0)
PLAT MORPH BLD: NORMAL — SIGNIFICANT CHANGE UP
PLATELET # BLD AUTO: 46 K/UL — LOW (ref 150–400)
POIKILOCYTOSIS BLD QL AUTO: SLIGHT — SIGNIFICANT CHANGE UP
RBC # BLD: 2.49 M/UL — LOW (ref 4.2–5.8)
RBC # FLD: 15.9 % — HIGH (ref 10.3–14.5)
RBC BLD AUTO: ABNORMAL
WBC # BLD: 2.51 K/UL — LOW (ref 3.8–10.5)
WBC # FLD AUTO: 2.51 K/UL — LOW (ref 3.8–10.5)

## 2020-06-08 ENCOUNTER — APPOINTMENT (OUTPATIENT)
Dept: INFUSION THERAPY | Facility: HOSPITAL | Age: 35
End: 2020-06-08

## 2020-06-08 ENCOUNTER — APPOINTMENT (OUTPATIENT)
Dept: HEMATOLOGY ONCOLOGY | Facility: CLINIC | Age: 35
End: 2020-06-08
Payer: COMMERCIAL

## 2020-06-08 ENCOUNTER — RESULT REVIEW (OUTPATIENT)
Age: 35
End: 2020-06-08

## 2020-06-08 VITALS
HEIGHT: 68.9 IN | TEMPERATURE: 98.4 F | WEIGHT: 188.5 LBS | SYSTOLIC BLOOD PRESSURE: 114 MMHG | BODY MASS INDEX: 27.92 KG/M2 | RESPIRATION RATE: 17 BRPM | OXYGEN SATURATION: 97 % | HEART RATE: 110 BPM | DIASTOLIC BLOOD PRESSURE: 80 MMHG

## 2020-06-08 LAB
BASOPHILS # BLD AUTO: 0.01 K/UL — SIGNIFICANT CHANGE UP (ref 0–0.2)
BASOPHILS NFR BLD AUTO: 0.3 % — SIGNIFICANT CHANGE UP (ref 0–2)
EOSINOPHIL # BLD AUTO: 0.01 K/UL — SIGNIFICANT CHANGE UP (ref 0–0.5)
EOSINOPHIL NFR BLD AUTO: 0.3 % — SIGNIFICANT CHANGE UP (ref 0–6)
HCT VFR BLD CALC: 25.1 % — LOW (ref 39–50)
HGB BLD-MCNC: 8.7 G/DL — LOW (ref 13–17)
IMM GRANULOCYTES NFR BLD AUTO: 1.5 % — SIGNIFICANT CHANGE UP (ref 0–1.5)
LYMPHOCYTES # BLD AUTO: 1.27 K/UL — SIGNIFICANT CHANGE UP (ref 1–3.3)
LYMPHOCYTES # BLD AUTO: 38.4 % — SIGNIFICANT CHANGE UP (ref 13–44)
MCHC RBC-ENTMCNC: 31.2 PG — SIGNIFICANT CHANGE UP (ref 27–34)
MCHC RBC-ENTMCNC: 34.7 GM/DL — SIGNIFICANT CHANGE UP (ref 32–36)
MCV RBC AUTO: 90 FL — SIGNIFICANT CHANGE UP (ref 80–100)
MONOCYTES # BLD AUTO: 0.52 K/UL — SIGNIFICANT CHANGE UP (ref 0–0.9)
MONOCYTES NFR BLD AUTO: 15.7 % — HIGH (ref 2–14)
NEUTROPHILS # BLD AUTO: 1.45 K/UL — LOW (ref 1.8–7.4)
NEUTROPHILS NFR BLD AUTO: 43.8 % — SIGNIFICANT CHANGE UP (ref 43–77)
NRBC # BLD: 0 /100 WBCS — SIGNIFICANT CHANGE UP (ref 0–0)
PLATELET # BLD AUTO: 60 K/UL — LOW (ref 150–400)
RBC # BLD: 2.79 M/UL — LOW (ref 4.2–5.8)
RBC # FLD: 17.9 % — HIGH (ref 10.3–14.5)
WBC # BLD: 3.31 K/UL — LOW (ref 3.8–10.5)
WBC # FLD AUTO: 3.31 K/UL — LOW (ref 3.8–10.5)

## 2020-06-08 PROCEDURE — 99214 OFFICE O/P EST MOD 30 MIN: CPT

## 2020-06-08 RX ORDER — LEVOFLOXACIN 500 MG/1
500 TABLET, FILM COATED ORAL DAILY
Refills: 0 | Status: DISCONTINUED | COMMUNITY
End: 2020-06-08

## 2020-06-08 RX ORDER — FLUCONAZOLE 200 MG/1
200 TABLET ORAL DAILY
Qty: 30 | Refills: 0 | Status: DISCONTINUED | COMMUNITY
End: 2020-06-08

## 2020-06-08 NOTE — REVIEW OF SYSTEMS
[Vision Problems] : vision problems [Easy Bleeding] : a tendency for easy bleeding [Easy Bruising] : a tendency for easy bruising [Vomiting] : vomiting [Chills] : no chills [Fever] : no fever [Night Sweats] : no night sweats [Fatigue] : no fatigue [Eye Pain] : no eye pain [Dysphagia] : no dysphagia [Nosebleeds] : no nosebleeds [Chest Pain] : no chest pain [Odynophagia] : no odynophagia [Palpitations] : no palpitations [Lower Ext Edema] : no lower extremity edema [Shortness Of Breath] : no shortness of breath [Wheezing] : no wheezing [Cough] : no cough [SOB on Exertion] : no shortness of breath during exertion [Abdominal Pain] : no abdominal pain [Constipation] : no constipation [Diarrhea] : no diarrhea [Dysuria] : no dysuria [Joint Pain] : no joint pain [Joint Stiffness] : no joint stiffness [Skin Rash] : no skin rash [Dizziness] : no dizziness [Fainting] : no fainting [Anxiety] : no anxiety [Depression] : no depression [FreeTextEntry3] : still not at 100%, but vision improved significantly.  [FreeTextEntry7] : +nausea

## 2020-06-08 NOTE — HISTORY OF PRESENT ILLNESS
[Disease:__________________________] : Disease: [unfilled] [Therapy: ___] : Therapy: [unfilled] [de-identified] : 35 y/o M with no PMH presented to OSH originally for c/o dizziness and feeling unwell and was then transferred to [facility] for further hematologic evaluation as patient was found to be severely pancytopenic. Patient first started noticing scattered bruising throughout his body (mostly thighs and arms) about 6 weeks prior to presentation, and also had epistaxis. Pt had also noticed intermittent fevers and chills, responsive to tylenol, and noticed very dark stools for the same time period. Also with spontaneous gum bleeding and endorsed severe fatigue and SOB with exertion. Also noted 15 lbs weight loss, partially intentional over past 2-3 months prior to presentation.  Also c/o mild headache located in posterior aspect of his head, denies sore throat, cough, +nausea but no vomiting, no abdominal pain, no diarrhea, no BRBPR, no urinary symptoms, no LE swelling, +visual changes.     \par \par Patient was transferred to [facility] from [facility] on 4/18. Patient was started on IVF, Levaquin, and allopurinol. Patient transfused blood and platelets. Bone Marrow biopsy performed on 4/20 consistent with Aplastic Anemia. 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 Because of this, patient's platelet transfusion threshold was made 50k. Horse ATG test dose was applied 4/22 with no reaction. Treatment started 4/23 consisting of equine ATG at 40mg/kg/day x 4 days, Cyclosporine 10mg/kg/day divided doses BID, Eltrombopag 150mg daily. Patient had episode of hives with ATG infusion on Day 1 thus steroid regimen was changed from oral prednisone to IV Solumedrol Q8 for the duration of ATG treatment and transitioned back to oral prednisone 1mg/kg daily for days 5-10 then tapered. The patient had grade 3 transaminitis likely from Promacta and ATG and Promacta was held as of 5/1. While patient was in the hospital, his course was also complicated by epigastric pain consistent with dyspepsia, most likely due to cyclosporine. Was managed on pepcid BID and sucralfate q6 hours and mylanta as needed with good relief. \par \par On 5/12 the patient's transaminitis had improved(grade 2 total bili, grade 1 AST, grade 2 ALT) and Promacta 150 mg po QD was resumed. Pt's cyclosporine dose was adjusted  according to the level (goal 200-400) and he was on 400 mg po q12h as of 5/18. On 5/16, patient developed chest discomfort during platelet transfusion which improved with benadryl. EKG and CXR were wnl. Patient originally refused benadryl pre-medication prior to platelets that day due to side effects with taking Benadryl, and it was decided to premedicate pt with Claritin or Zyrtec pre blood transfusion instead. On 5/20, cyclosporine level was 460, therefore dose was lowered to 300 mg PO BID. Patient's vision had improved, so platelet transfusion threshold liberalized to 20k. \par \par Since discharge, patients blood counts have continued to improve. \par  [de-identified] : - Markedly hypocellular bone marrow (mostly 5% and focal 10-\par 15%) with focal minimal erythropoiesis, minimal to absent\par myelopoiesis, absent megakaryocytes, increased iron stores. Aplastic bone marrow.  [de-identified] : very severe Aplastic Anemia [de-identified] : Cytogenetics: \par \par Result: Male karyotype\par Karyotype: 46,XY  {12  } [FreeTextEntry1] : Therapy initiated 4/23/2020 [de-identified] : Patient reports that he had one episode of nausea and vomiting this weekend, however that has mostly resolved. He still feels a little nauseous but not as severe. He took Zofran but that didn't help. Otherwise he still has epigastric pain however not as intense and it comes and goes. He has not been able to obtain sucralfate as of yet. No fevers or chills, no diarrhea, no chest pain or dyspnea.

## 2020-06-08 NOTE — PHYSICAL EXAM
[Fully active, able to carry on all pre-disease performance without restriction] : Status 0 - Fully active, able to carry on all pre-disease performance without restriction [Normal] : affect appropriate [Ulcers] : no ulcers [Thrush] : no thrush [Mucositis] : no mucositis

## 2020-06-08 NOTE — ASSESSMENT
[FreeTextEntry1] : 35 y/o M with recent diagnosis of Aplastic anemia (very severe AA) confirmed on bone marrow biopsy at [facility] and now s/p inpatient treatment beginning 4/23/2020 with horse ATG + CsA + promacta.\par \par -Hematologic parameters improving, indicating response to IST. No longer requiring transfusional support. \par -Platelets up to 60k, will have one more possible plt appointment Thursday but then can move to weekly checks.\par -Patient now only mildly neutropenic, can stop diflucan and levaquin. \par -Currently on CsA 300 mg BID, rechecking trough today.\par -Creatinine previously uptrending but now normal, likely was a result of supratherapeutic CsA level inpatient.  \par -Will follow up appeal for sucralfate. \par - CsA and Promacta will be planned for 6 months, followed by a taper based on response. \par -Instructed patient on COVID19 precautions, and educated on proper hand washing and social distancing. \par - All questions answered to the best of my ability and to the patient's apparent satisfaction.

## 2020-06-10 LAB — CYCLOSPORINE SER-MCNC: 617 NG/ML

## 2020-06-12 ENCOUNTER — RESULT REVIEW (OUTPATIENT)
Age: 35
End: 2020-06-12

## 2020-06-12 ENCOUNTER — APPOINTMENT (OUTPATIENT)
Dept: INFUSION THERAPY | Facility: HOSPITAL | Age: 35
End: 2020-06-12

## 2020-06-12 LAB
BASOPHILS # BLD AUTO: 0.03 K/UL — SIGNIFICANT CHANGE UP (ref 0–0.2)
BASOPHILS NFR BLD AUTO: 1 % — SIGNIFICANT CHANGE UP (ref 0–2)
EOSINOPHIL # BLD AUTO: 0.08 K/UL — SIGNIFICANT CHANGE UP (ref 0–0.5)
EOSINOPHIL NFR BLD AUTO: 3 % — SIGNIFICANT CHANGE UP (ref 0–6)
HCT VFR BLD CALC: 23.3 % — LOW (ref 39–50)
HGB BLD-MCNC: 8.2 G/DL — LOW (ref 13–17)
LYMPHOCYTES # BLD AUTO: 0.79 K/UL — LOW (ref 1–3.3)
LYMPHOCYTES # BLD AUTO: 31 % — SIGNIFICANT CHANGE UP (ref 13–44)
MCHC RBC-ENTMCNC: 32.4 PG — SIGNIFICANT CHANGE UP (ref 27–34)
MCHC RBC-ENTMCNC: 35.2 GM/DL — SIGNIFICANT CHANGE UP (ref 32–36)
MCV RBC AUTO: 92.1 FL — SIGNIFICANT CHANGE UP (ref 80–100)
MONOCYTES # BLD AUTO: 0.38 K/UL — SIGNIFICANT CHANGE UP (ref 0–0.9)
MONOCYTES NFR BLD AUTO: 15 % — HIGH (ref 2–14)
NEUTROPHILS # BLD AUTO: 1.28 K/UL — LOW (ref 1.8–7.4)
NEUTROPHILS NFR BLD AUTO: 50 % — SIGNIFICANT CHANGE UP (ref 43–77)
NRBC # BLD: 1 /100 — HIGH (ref 0–0)
NRBC # BLD: SIGNIFICANT CHANGE UP /100 WBCS (ref 0–0)
PLAT MORPH BLD: NORMAL — SIGNIFICANT CHANGE UP
PLATELET # BLD AUTO: 69 K/UL — LOW (ref 150–400)
RBC # BLD: 2.53 M/UL — LOW (ref 4.2–5.8)
RBC # FLD: 20.1 % — HIGH (ref 10.3–14.5)
RBC BLD AUTO: SIGNIFICANT CHANGE UP
WBC # BLD: 2.55 K/UL — LOW (ref 3.8–10.5)
WBC # FLD AUTO: 2.55 K/UL — LOW (ref 3.8–10.5)

## 2020-06-15 LAB
ALBUMIN SERPL ELPH-MCNC: 4.6 G/DL
ALP BLD-CCNC: 108 U/L
ALT SERPL-CCNC: 23 U/L
ANION GAP SERPL CALC-SCNC: 14 MMOL/L
AST SERPL-CCNC: 18 U/L
BILIRUB SERPL-MCNC: 0.8 MG/DL
BUN SERPL-MCNC: 32 MG/DL
CALCIUM SERPL-MCNC: 9.4 MG/DL
CHLORIDE SERPL-SCNC: 103 MMOL/L
CO2 SERPL-SCNC: 24 MMOL/L
CREAT SERPL-MCNC: 1.28 MG/DL
CYCLOSPORINE SER-MCNC: 449 NG/ML
GLUCOSE SERPL-MCNC: 136 MG/DL
POTASSIUM SERPL-SCNC: 4.1 MMOL/L
PROT SERPL-MCNC: 7.2 G/DL
SODIUM SERPL-SCNC: 141 MMOL/L

## 2020-06-17 ENCOUNTER — OUTPATIENT (OUTPATIENT)
Dept: OUTPATIENT SERVICES | Facility: HOSPITAL | Age: 35
LOS: 1 days | Discharge: HOME | End: 2020-06-17

## 2020-06-17 DIAGNOSIS — D64.9 ANEMIA, UNSPECIFIED: ICD-10-CM

## 2020-06-17 DIAGNOSIS — Z98.890 OTHER SPECIFIED POSTPROCEDURAL STATES: Chronic | ICD-10-CM

## 2020-06-22 ENCOUNTER — APPOINTMENT (OUTPATIENT)
Dept: HEMATOLOGY ONCOLOGY | Facility: CLINIC | Age: 35
End: 2020-06-22
Payer: MEDICAID

## 2020-06-22 PROCEDURE — 99214 OFFICE O/P EST MOD 30 MIN: CPT | Mod: 95

## 2020-06-22 RX ORDER — SUCRALFATE 1 G/10ML
1 SUSPENSION ORAL 4 TIMES DAILY
Qty: 3 | Refills: 3 | Status: DISCONTINUED | COMMUNITY
Start: 2020-05-29 | End: 2020-06-22

## 2020-06-22 RX ORDER — CYCLOSPORINE 100 MG/1
100 CAPSULE, GELATIN COATED ORAL TWICE DAILY
Refills: 0 | Status: DISCONTINUED | COMMUNITY
End: 2020-06-22

## 2020-06-22 NOTE — ASSESSMENT
[FreeTextEntry1] : 35 y/o M with recent diagnosis of Aplastic anemia (very severe AA) confirmed on bone marrow biopsy at [facility] and now s/p inpatient treatment beginning 4/23/2020 with horse ATG + CsA + promacta.\par \par -Hematologic parameters improving, indicating response to IST. No longer requiring transfusional support. \par -Weekly CBC checks. \par -Patient now only mildly neutropenic, previously stopped diflucan and levaquin. \par -Currently on CsA 250 mg BID, rechecking trough tomorrow AM.\par -Creatinine previously uptrending but now normal, likely was a result of supratherapeutic CsA level.  \par - CsA and Promacta will be planned for 6 months, followed by a taper based on response. \par -Instructed patient on COVID19 precautions, and educated on proper hand washing and social distancing. \par - All questions answered to the best of my ability and to the patient's apparent satisfaction.\par \par The visit was completed via tele-medicine visit due to the restrictions of the COVID-19 pandemic. All issues as above were discussed and addressed but no physical exam was performed. If it was felt that the patient should be evaluated in the clinic then they were directed there. The patient verbally consented to visit.\par

## 2020-06-22 NOTE — REVIEW OF SYSTEMS
[Night Sweats] : no night sweats [Chills] : no chills [Fever] : no fever [Fatigue] : no fatigue [Vision Problems] : no vision problems [Eye Pain] : no eye pain [Odynophagia] : no odynophagia [Nosebleeds] : no nosebleeds [Dysphagia] : no dysphagia [Chest Pain] : no chest pain [Palpitations] : no palpitations [Lower Ext Edema] : no lower extremity edema [Shortness Of Breath] : no shortness of breath [Wheezing] : no wheezing [Cough] : no cough [SOB on Exertion] : no shortness of breath during exertion [Vomiting] : no vomiting [Constipation] : no constipation [Abdominal Pain] : no abdominal pain [Diarrhea] : no diarrhea [Joint Pain] : no joint pain [Dysuria] : no dysuria [Joint Stiffness] : no joint stiffness [Skin Rash] : no skin rash [Dizziness] : no dizziness [Depression] : no depression [Anxiety] : no anxiety [Fainting] : no fainting [Easy Bleeding] : a tendency for easy bleeding [Easy Bruising] : a tendency for easy bruising

## 2020-06-22 NOTE — HISTORY OF PRESENT ILLNESS
[Verbal consent obtained from patient] : the patient, [unfilled] [Medical Office: ([facility])___] : at the medical office located in  [Home] : at home, [unfilled] , at the time of the visit. [de-identified] : 35 y/o M with no PMH presented to OSH originally for c/o dizziness and feeling unwell and was then transferred to [facility] for further hematologic evaluation as patient was found to be severely pancytopenic. Patient first started noticing scattered bruising throughout his body (mostly thighs and arms) about 6 weeks prior to presentation, and also had epistaxis. Pt had also noticed intermittent fevers and chills, responsive to tylenol, and noticed very dark stools for the same time period. Also with spontaneous gum bleeding and endorsed severe fatigue and SOB with exertion. Also noted 15 lbs weight loss, partially intentional over past 2-3 months prior to presentation.  Also c/o mild headache located in posterior aspect of his head, denies sore throat, cough, +nausea but no vomiting, no abdominal pain, no diarrhea, no BRBPR, no urinary symptoms, no LE swelling, +visual changes.     \par \par Patient was transferred to [facility] from [facility] on 4/18. Patient was started on IVF, Levaquin, and allopurinol. Patient transfused blood and platelets. Bone Marrow biopsy performed on 4/20 consistent with Aplastic Anemia. 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 Because of this, patient's platelet transfusion threshold was made 50k. Horse ATG test dose was applied 4/22 with no reaction. Treatment started 4/23 consisting of equine ATG at 40mg/kg/day x 4 days, Cyclosporine 10mg/kg/day divided doses BID, Eltrombopag 150mg daily. Patient had episode of hives with ATG infusion on Day 1 thus steroid regimen was changed from oral prednisone to IV Solumedrol Q8 for the duration of ATG treatment and transitioned back to oral prednisone 1mg/kg daily for days 5-10 then tapered. The patient had grade 3 transaminitis likely from Promacta and ATG and Promacta was held as of 5/1. While patient was in the hospital, his course was also complicated by epigastric pain consistent with dyspepsia, most likely due to cyclosporine. Was managed on pepcid BID and sucralfate q6 hours and mylanta as needed with good relief. \par \par On 5/12 the patient's transaminitis had improved(grade 2 total bili, grade 1 AST, grade 2 ALT) and Promacta 150 mg po QD was resumed. Pt's cyclosporine dose was adjusted  according to the level (goal 200-400) and he was on 400 mg po q12h as of 5/18. On 5/16, patient developed chest discomfort during platelet transfusion which improved with benadryl. EKG and CXR were wnl. Patient originally refused benadryl pre-medication prior to platelets that day due to side effects with taking Benadryl, and it was decided to premedicate pt with Claritin or Zyrtec pre blood transfusion instead. On 5/20, cyclosporine level was 460, therefore dose was lowered to 300 mg PO BID. Patient's vision had improved, so platelet transfusion threshold liberalized to 20k. \par \par Since discharge, patients blood counts have continued to improve.\par  [Disease:__________________________] : Disease: [unfilled] [de-identified] : - Markedly hypocellular bone marrow (mostly 5% and focal 10-\par 15%) with focal minimal erythropoiesis, minimal to absent\par myelopoiesis, absent megakaryocytes, increased iron stores. Aplastic bone marrow.  [de-identified] : Cytogenetics: \par \par Result: Male karyotype\par Karyotype: 46,XY   {12   } [de-identified] : very severe Aplastic Anemia [Therapy: ___] : Therapy: [unfilled] [de-identified] : Patient reports that he feels very well at this point. He has no more stomach pain. Taking the liquid cyclosporine now, as his level was still elevated on last check and changed to 250 mg BID.  [FreeTextEntry1] : Therapy initiated 4/23/2020

## 2020-06-22 NOTE — PHYSICAL EXAM
[de-identified] : Cannot perform physical exam due to nature of telemedicine visit, however on telemedicine patient appears to not be in any acute distress [Fully active, able to carry on all pre-disease performance without restriction] : Status 0 - Fully active, able to carry on all pre-disease performance without restriction

## 2020-06-23 ENCOUNTER — RESULT REVIEW (OUTPATIENT)
Age: 35
End: 2020-06-23

## 2020-06-23 ENCOUNTER — APPOINTMENT (OUTPATIENT)
Dept: HEMATOLOGY ONCOLOGY | Facility: CLINIC | Age: 35
End: 2020-06-23

## 2020-06-23 LAB
ANISOCYTOSIS BLD QL: SLIGHT — SIGNIFICANT CHANGE UP
BASOPHILS # BLD AUTO: 0.03 K/UL — SIGNIFICANT CHANGE UP (ref 0–0.2)
BASOPHILS NFR BLD AUTO: 1 % — SIGNIFICANT CHANGE UP (ref 0–2)
EOSINOPHIL # BLD AUTO: 0.12 K/UL — SIGNIFICANT CHANGE UP (ref 0–0.5)
EOSINOPHIL NFR BLD AUTO: 4 % — SIGNIFICANT CHANGE UP (ref 0–6)
HCT VFR BLD CALC: 25.2 % — LOW (ref 39–50)
HGB BLD-MCNC: 8.7 G/DL — LOW (ref 13–17)
HYPOCHROMIA BLD QL: SLIGHT — SIGNIFICANT CHANGE UP
LYMPHOCYTES # BLD AUTO: 1.01 K/UL — SIGNIFICANT CHANGE UP (ref 1–3.3)
LYMPHOCYTES # BLD AUTO: 34 % — SIGNIFICANT CHANGE UP (ref 13–44)
MACROCYTES BLD QL: SLIGHT — SIGNIFICANT CHANGE UP
MCHC RBC-ENTMCNC: 33.3 PG — SIGNIFICANT CHANGE UP (ref 27–34)
MCHC RBC-ENTMCNC: 34.5 GM/DL — SIGNIFICANT CHANGE UP (ref 32–36)
MCV RBC AUTO: 96.6 FL — SIGNIFICANT CHANGE UP (ref 80–100)
MICROCYTES BLD QL: SLIGHT — SIGNIFICANT CHANGE UP
MONOCYTES # BLD AUTO: 0.27 K/UL — SIGNIFICANT CHANGE UP (ref 0–0.9)
MONOCYTES NFR BLD AUTO: 9 % — SIGNIFICANT CHANGE UP (ref 2–14)
NEUTROPHILS # BLD AUTO: 1.54 K/UL — LOW (ref 1.8–7.4)
NEUTROPHILS NFR BLD AUTO: 52 % — SIGNIFICANT CHANGE UP (ref 43–77)
NRBC # BLD: 2 /100 — HIGH (ref 0–0)
NRBC # BLD: SIGNIFICANT CHANGE UP /100 WBCS (ref 0–0)
PLAT MORPH BLD: NORMAL — SIGNIFICANT CHANGE UP
PLATELET # BLD AUTO: 89 K/UL — LOW (ref 150–400)
POLYCHROMASIA BLD QL SMEAR: SLIGHT — SIGNIFICANT CHANGE UP
RBC # BLD: 2.61 M/UL — LOW (ref 4.2–5.8)
RBC # FLD: SIGNIFICANT CHANGE UP (ref 10.3–14.5)
RBC BLD AUTO: ABNORMAL
WBC # BLD: 2.96 K/UL — LOW (ref 3.8–10.5)
WBC # FLD AUTO: 2.96 K/UL — LOW (ref 3.8–10.5)

## 2020-06-24 LAB
ALBUMIN SERPL ELPH-MCNC: 4.5 G/DL
ALP BLD-CCNC: 88 U/L
ALT SERPL-CCNC: 24 U/L
ANION GAP SERPL CALC-SCNC: 11 MMOL/L
AST SERPL-CCNC: 21 U/L
BILIRUB SERPL-MCNC: 0.7 MG/DL
BUN SERPL-MCNC: 20 MG/DL
CALCIUM SERPL-MCNC: 9.2 MG/DL
CHLORIDE SERPL-SCNC: 105 MMOL/L
CO2 SERPL-SCNC: 23 MMOL/L
CREAT SERPL-MCNC: 0.98 MG/DL
CYCLOSPORINE SER-MCNC: 249 NG/ML
GLUCOSE SERPL-MCNC: 101 MG/DL
HLA-A,B SEROLOGICPLT RESULT: SIGNIFICANT CHANGE UP
POTASSIUM SERPL-SCNC: 4.2 MMOL/L
PROT SERPL-MCNC: 7 G/DL
SODIUM SERPL-SCNC: 139 MMOL/L

## 2020-07-10 ENCOUNTER — RESULT REVIEW (OUTPATIENT)
Age: 35
End: 2020-07-10

## 2020-07-10 ENCOUNTER — APPOINTMENT (OUTPATIENT)
Dept: HEMATOLOGY ONCOLOGY | Facility: CLINIC | Age: 35
End: 2020-07-10

## 2020-07-10 LAB
BASOPHILS # BLD AUTO: 0.02 K/UL — SIGNIFICANT CHANGE UP (ref 0–0.2)
BASOPHILS NFR BLD AUTO: 0.5 % — SIGNIFICANT CHANGE UP (ref 0–2)
EOSINOPHIL # BLD AUTO: 0.1 K/UL — SIGNIFICANT CHANGE UP (ref 0–0.5)
EOSINOPHIL NFR BLD AUTO: 2.4 % — SIGNIFICANT CHANGE UP (ref 0–6)
HCT VFR BLD CALC: 29.6 % — LOW (ref 39–50)
HGB BLD-MCNC: 10.3 G/DL — LOW (ref 13–17)
IMM GRANULOCYTES NFR BLD AUTO: 1.4 % — SIGNIFICANT CHANGE UP (ref 0–1.5)
LYMPHOCYTES # BLD AUTO: 1.43 K/UL — SIGNIFICANT CHANGE UP (ref 1–3.3)
LYMPHOCYTES # BLD AUTO: 34.4 % — SIGNIFICANT CHANGE UP (ref 13–44)
MCHC RBC-ENTMCNC: 33.8 PG — SIGNIFICANT CHANGE UP (ref 27–34)
MCHC RBC-ENTMCNC: 34.8 GM/DL — SIGNIFICANT CHANGE UP (ref 32–36)
MCV RBC AUTO: 97 FL — SIGNIFICANT CHANGE UP (ref 80–100)
MONOCYTES # BLD AUTO: 0.42 K/UL — SIGNIFICANT CHANGE UP (ref 0–0.9)
MONOCYTES NFR BLD AUTO: 10.1 % — SIGNIFICANT CHANGE UP (ref 2–14)
NEUTROPHILS # BLD AUTO: 2.13 K/UL — SIGNIFICANT CHANGE UP (ref 1.8–7.4)
NEUTROPHILS NFR BLD AUTO: 51.2 % — SIGNIFICANT CHANGE UP (ref 43–77)
NRBC # BLD: 0 /100 WBCS — SIGNIFICANT CHANGE UP (ref 0–0)
PLATELET # BLD AUTO: 171 K/UL — SIGNIFICANT CHANGE UP (ref 150–400)
RBC # BLD: 3.05 M/UL — LOW (ref 4.2–5.8)
RBC # FLD: 18.2 % — HIGH (ref 10.3–14.5)
WBC # BLD: 4.16 K/UL — SIGNIFICANT CHANGE UP (ref 3.8–10.5)
WBC # FLD AUTO: 4.16 K/UL — SIGNIFICANT CHANGE UP (ref 3.8–10.5)

## 2020-07-13 LAB
ALBUMIN SERPL ELPH-MCNC: 4.8 G/DL
ALP BLD-CCNC: 85 U/L
ALT SERPL-CCNC: 32 U/L
ANION GAP SERPL CALC-SCNC: 13 MMOL/L
AST SERPL-CCNC: 20 U/L
BILIRUB SERPL-MCNC: 0.6 MG/DL
BUN SERPL-MCNC: 26 MG/DL
CALCIUM SERPL-MCNC: 9.5 MG/DL
CHLORIDE SERPL-SCNC: 104 MMOL/L
CO2 SERPL-SCNC: 24 MMOL/L
CREAT SERPL-MCNC: 0.94 MG/DL
CYCLOSPORINE SER-MCNC: 235 NG/ML
GLUCOSE SERPL-MCNC: 119 MG/DL
POTASSIUM SERPL-SCNC: 4.1 MMOL/L
PROT SERPL-MCNC: 7.3 G/DL
SODIUM SERPL-SCNC: 141 MMOL/L

## 2020-07-17 ENCOUNTER — RESULT REVIEW (OUTPATIENT)
Age: 35
End: 2020-07-17

## 2020-07-17 ENCOUNTER — LABORATORY RESULT (OUTPATIENT)
Age: 35
End: 2020-07-17

## 2020-07-17 ENCOUNTER — APPOINTMENT (OUTPATIENT)
Dept: HEMATOLOGY ONCOLOGY | Facility: CLINIC | Age: 35
End: 2020-07-17

## 2020-07-17 LAB
BASOPHILS # BLD AUTO: 0.02 K/UL — SIGNIFICANT CHANGE UP (ref 0–0.2)
BASOPHILS NFR BLD AUTO: 0.5 % — SIGNIFICANT CHANGE UP (ref 0–2)
EOSINOPHIL # BLD AUTO: 0.15 K/UL — SIGNIFICANT CHANGE UP (ref 0–0.5)
EOSINOPHIL NFR BLD AUTO: 3.5 % — SIGNIFICANT CHANGE UP (ref 0–6)
HCT VFR BLD CALC: 32.6 % — LOW (ref 39–50)
HGB BLD-MCNC: 11.5 G/DL — LOW (ref 13–17)
IMM GRANULOCYTES NFR BLD AUTO: 0.9 % — SIGNIFICANT CHANGE UP (ref 0–1.5)
LYMPHOCYTES # BLD AUTO: 1.54 K/UL — SIGNIFICANT CHANGE UP (ref 1–3.3)
LYMPHOCYTES # BLD AUTO: 36.2 % — SIGNIFICANT CHANGE UP (ref 13–44)
MCHC RBC-ENTMCNC: 34 PG — SIGNIFICANT CHANGE UP (ref 27–34)
MCHC RBC-ENTMCNC: 35.3 GM/DL — SIGNIFICANT CHANGE UP (ref 32–36)
MCV RBC AUTO: 96.4 FL — SIGNIFICANT CHANGE UP (ref 80–100)
MONOCYTES # BLD AUTO: 0.36 K/UL — SIGNIFICANT CHANGE UP (ref 0–0.9)
MONOCYTES NFR BLD AUTO: 8.5 % — SIGNIFICANT CHANGE UP (ref 2–14)
NEUTROPHILS # BLD AUTO: 2.14 K/UL — SIGNIFICANT CHANGE UP (ref 1.8–7.4)
NEUTROPHILS NFR BLD AUTO: 50.4 % — SIGNIFICANT CHANGE UP (ref 43–77)
NRBC # BLD: 0 /100 WBCS — SIGNIFICANT CHANGE UP (ref 0–0)
PLATELET # BLD AUTO: 186 K/UL — SIGNIFICANT CHANGE UP (ref 150–400)
RBC # BLD: 3.38 M/UL — LOW (ref 4.2–5.8)
RBC # FLD: 16.1 % — HIGH (ref 10.3–14.5)
WBC # BLD: 4.25 K/UL — SIGNIFICANT CHANGE UP (ref 3.8–10.5)
WBC # FLD AUTO: 4.25 K/UL — SIGNIFICANT CHANGE UP (ref 3.8–10.5)

## 2020-07-21 ENCOUNTER — OUTPATIENT (OUTPATIENT)
Dept: OUTPATIENT SERVICES | Facility: HOSPITAL | Age: 35
LOS: 1 days | Discharge: HOME | End: 2020-07-21

## 2020-07-21 DIAGNOSIS — D64.9 ANEMIA, UNSPECIFIED: ICD-10-CM

## 2020-07-21 DIAGNOSIS — Z98.890 OTHER SPECIFIED POSTPROCEDURAL STATES: Chronic | ICD-10-CM

## 2020-07-21 LAB
ALBUMIN SERPL ELPH-MCNC: 4.8 G/DL
ALP BLD-CCNC: 87 U/L
ALT SERPL-CCNC: 26 U/L
ANION GAP SERPL CALC-SCNC: 14 MMOL/L
AST SERPL-CCNC: 16 U/L
BILIRUB SERPL-MCNC: 0.7 MG/DL
BUN SERPL-MCNC: 17 MG/DL
CALCIUM SERPL-MCNC: 9.5 MG/DL
CHLORIDE SERPL-SCNC: 104 MMOL/L
CO2 SERPL-SCNC: 24 MMOL/L
CREAT SERPL-MCNC: 1.06 MG/DL
GLUCOSE SERPL-MCNC: 134 MG/DL
POTASSIUM SERPL-SCNC: 4.2 MMOL/L
PROT SERPL-MCNC: 7.4 G/DL
SODIUM SERPL-SCNC: 143 MMOL/L

## 2020-07-24 ENCOUNTER — APPOINTMENT (OUTPATIENT)
Dept: HEMATOLOGY ONCOLOGY | Facility: CLINIC | Age: 35
End: 2020-07-24
Payer: COMMERCIAL

## 2020-07-24 ENCOUNTER — RESULT REVIEW (OUTPATIENT)
Age: 35
End: 2020-07-24

## 2020-07-24 ENCOUNTER — APPOINTMENT (OUTPATIENT)
Dept: HEMATOLOGY ONCOLOGY | Facility: CLINIC | Age: 35
End: 2020-07-24

## 2020-07-24 VITALS
BODY MASS INDEX: 34.09 KG/M2 | TEMPERATURE: 98 F | OXYGEN SATURATION: 99 % | WEIGHT: 230.16 LBS | SYSTOLIC BLOOD PRESSURE: 130 MMHG | RESPIRATION RATE: 18 BRPM | DIASTOLIC BLOOD PRESSURE: 83 MMHG | HEART RATE: 99 BPM

## 2020-07-24 LAB
BASOPHILS # BLD AUTO: 0.03 K/UL — SIGNIFICANT CHANGE UP (ref 0–0.2)
BASOPHILS NFR BLD AUTO: 0.5 % — SIGNIFICANT CHANGE UP (ref 0–2)
EOSINOPHIL # BLD AUTO: 0.17 K/UL — SIGNIFICANT CHANGE UP (ref 0–0.5)
EOSINOPHIL NFR BLD AUTO: 3.1 % — SIGNIFICANT CHANGE UP (ref 0–6)
HCT VFR BLD CALC: 32.8 % — LOW (ref 39–50)
HGB BLD-MCNC: 11.8 G/DL — LOW (ref 13–17)
IMM GRANULOCYTES NFR BLD AUTO: 2 % — HIGH (ref 0–1.5)
LYMPHOCYTES # BLD AUTO: 2.05 K/UL — SIGNIFICANT CHANGE UP (ref 1–3.3)
LYMPHOCYTES # BLD AUTO: 37.1 % — SIGNIFICANT CHANGE UP (ref 13–44)
MCHC RBC-ENTMCNC: 33.8 PG — SIGNIFICANT CHANGE UP (ref 27–34)
MCHC RBC-ENTMCNC: 36 GM/DL — SIGNIFICANT CHANGE UP (ref 32–36)
MCV RBC AUTO: 94 FL — SIGNIFICANT CHANGE UP (ref 80–100)
MONOCYTES # BLD AUTO: 0.49 K/UL — SIGNIFICANT CHANGE UP (ref 0–0.9)
MONOCYTES NFR BLD AUTO: 8.9 % — SIGNIFICANT CHANGE UP (ref 2–14)
NEUTROPHILS # BLD AUTO: 2.68 K/UL — SIGNIFICANT CHANGE UP (ref 1.8–7.4)
NEUTROPHILS NFR BLD AUTO: 48.4 % — SIGNIFICANT CHANGE UP (ref 43–77)
NRBC # BLD: 0 /100 WBCS — SIGNIFICANT CHANGE UP (ref 0–0)
PLATELET # BLD AUTO: 188 K/UL — SIGNIFICANT CHANGE UP (ref 150–400)
RBC # BLD: 3.49 M/UL — LOW (ref 4.2–5.8)
RBC # FLD: 14.5 % — SIGNIFICANT CHANGE UP (ref 10.3–14.5)
WBC # BLD: 5.53 K/UL — SIGNIFICANT CHANGE UP (ref 3.8–10.5)
WBC # FLD AUTO: 5.53 K/UL — SIGNIFICANT CHANGE UP (ref 3.8–10.5)

## 2020-07-24 PROCEDURE — 99214 OFFICE O/P EST MOD 30 MIN: CPT

## 2020-07-24 NOTE — REVIEW OF SYSTEMS
[Fever] : no fever [Night Sweats] : no night sweats [Fatigue] : no fatigue [Chills] : no chills [Vision Problems] : no vision problems [Eye Pain] : no eye pain [Nosebleeds] : no nosebleeds [Odynophagia] : no odynophagia [Dysphagia] : no dysphagia [Lower Ext Edema] : no lower extremity edema [Chest Pain] : no chest pain [Palpitations] : no palpitations [Wheezing] : no wheezing [Cough] : no cough [Shortness Of Breath] : no shortness of breath [Vomiting] : no vomiting [SOB on Exertion] : no shortness of breath during exertion [Abdominal Pain] : no abdominal pain [Constipation] : no constipation [Diarrhea] : no diarrhea [Dysuria] : no dysuria [Skin Rash] : no skin rash [Joint Stiffness] : no joint stiffness [Joint Pain] : no joint pain [Fainting] : no fainting [Anxiety] : no anxiety [Dizziness] : no dizziness [Easy Bruising] : no tendency for easy bruising [Depression] : no depression [Easy Bleeding] : no tendency for easy bleeding

## 2020-07-24 NOTE — HISTORY OF PRESENT ILLNESS
[de-identified] : 33 y/o M with no PMH presented to OSH originally for c/o dizziness and feeling unwell and was then transferred to [facility] for further hematologic evaluation as patient was found to be severely pancytopenic. Patient first started noticing scattered bruising throughout his body (mostly thighs and arms) about 6 weeks prior to presentation, and also had epistaxis. Pt had also noticed intermittent fevers and chills, responsive to tylenol, and noticed very dark stools for the same time period. Also with spontaneous gum bleeding and endorsed severe fatigue and SOB with exertion. Also noted 15 lbs weight loss, partially intentional over past 2-3 months prior to presentation.  Also c/o mild headache located in posterior aspect of his head, denies sore throat, cough, +nausea but no vomiting, no abdominal pain, no diarrhea, no BRBPR, no urinary symptoms, no LE swelling, +visual changes.     \par \par Patient was transferred to [facility] from [facility] on 4/18. Patient was started on IVF, Levaquin, and allopurinol. Patient transfused blood and platelets. Bone Marrow biopsy performed on 4/20 consistent with Aplastic Anemia. 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 Because of this, patient's platelet transfusion threshold was made 50k. Horse ATG test dose was applied 4/22 with no reaction. Treatment started 4/23 consisting of equine ATG at 40mg/kg/day x 4 days, Cyclosporine 10mg/kg/day divided doses BID, Eltrombopag 150mg daily. Patient had episode of hives with ATG infusion on Day 1 thus steroid regimen was changed from oral prednisone to IV Solumedrol Q8 for the duration of ATG treatment and transitioned back to oral prednisone 1mg/kg daily for days 5-10 then tapered. The patient had grade 3 transaminitis likely from Promacta and ATG and Promacta was held as of 5/1. While patient was in the hospital, his course was also complicated by epigastric pain consistent with dyspepsia, most likely due to cyclosporine. Was managed on pepcid BID and sucralfate q6 hours and mylanta as needed with good relief. \par \par On 5/12 the patient's transaminitis had improved(grade 2 total bili, grade 1 AST, grade 2 ALT) and Promacta 150 mg po QD was resumed. Pt's cyclosporine dose was adjusted  according to the level (goal 200-400) and he was on 400 mg po q12h as of 5/18. On 5/16, patient developed chest discomfort during platelet transfusion which improved with benadryl. EKG and CXR were wnl. Patient originally refused benadryl pre-medication prior to platelets that day due to side effects with taking Benadryl, and it was decided to premedicate pt with Claritin or Zyrtec pre blood transfusion instead. On 5/20, cyclosporine level was 460, therefore dose was lowered to 300 mg PO BID. Patient's vision had improved, so platelet transfusion threshold liberalized to 20k. \par \par Since discharge, patients blood counts have continued to improve.\par  [Disease:__________________________] : Disease: [unfilled] [de-identified] : very severe Aplastic Anemia [de-identified] : Cytogenetics: \par \par Result: Male karyotype\par Karyotype: 46,XY    {12    } [Therapy: ___] : Therapy: [unfilled] [de-identified] : - Markedly hypocellular bone marrow (mostly 5% and focal 10-\par 15%) with focal minimal erythropoiesis, minimal to absent\par myelopoiesis, absent megakaryocytes, increased iron stores. Aplastic bone marrow.  [FreeTextEntry1] : Therapy initiated 4/23/2020 [de-identified] : Today patient feels "very well". He reports his stomach issues are in the past and he feels strong and back to his normal self.

## 2020-07-24 NOTE — ASSESSMENT
[FreeTextEntry1] : 35 y/o M with recent diagnosis of Aplastic anemia (very severe AA) confirmed on bone marrow biopsy at [facility] and now s/p inpatient treatment beginning 4/23/2020 with horse ATG + CsA + promacta.\par \par -Hematologic parameters back to almost completely normal (outside of still slightly anemic), indicating excellent response to IST. No longer requiring any transfusional support. \par -CBC checks q 2weeeks. \par -Currently on CsA 250 mg BID, trough levels have been therapeutic.\par -Creatinine previously uptrending but now normal, likely was a result of supratherapeutic CsA level.  \par - CsA and Promacta will be planned for 6 months, followed by a taper based on response. This will be scheduled to end October 23rd 2020. \par -Instructed patient on COVID19 precautions, and educated on proper hand washing and social distancing. \par - All questions answered to the best of my ability and to the patient's apparent satisfaction.\par

## 2020-07-31 ENCOUNTER — APPOINTMENT (OUTPATIENT)
Dept: HEMATOLOGY ONCOLOGY | Facility: CLINIC | Age: 35
End: 2020-07-31

## 2020-08-07 ENCOUNTER — APPOINTMENT (OUTPATIENT)
Dept: HEMATOLOGY ONCOLOGY | Facility: CLINIC | Age: 35
End: 2020-08-07

## 2020-08-16 ENCOUNTER — OUTPATIENT (OUTPATIENT)
Dept: OUTPATIENT SERVICES | Facility: HOSPITAL | Age: 35
LOS: 1 days | Discharge: HOME | End: 2020-08-16

## 2020-08-16 DIAGNOSIS — Z98.890 OTHER SPECIFIED POSTPROCEDURAL STATES: Chronic | ICD-10-CM

## 2020-08-16 DIAGNOSIS — D64.9 ANEMIA, UNSPECIFIED: ICD-10-CM

## 2020-08-17 ENCOUNTER — RESULT REVIEW (OUTPATIENT)
Age: 35
End: 2020-08-17

## 2020-08-21 ENCOUNTER — APPOINTMENT (OUTPATIENT)
Dept: HEMATOLOGY ONCOLOGY | Facility: CLINIC | Age: 35
End: 2020-08-21

## 2020-08-21 ENCOUNTER — RESULT REVIEW (OUTPATIENT)
Age: 35
End: 2020-08-21

## 2020-08-21 ENCOUNTER — LABORATORY RESULT (OUTPATIENT)
Age: 35
End: 2020-08-21

## 2020-08-21 LAB
BASOPHILS # BLD AUTO: 0.03 K/UL — SIGNIFICANT CHANGE UP (ref 0–0.2)
BASOPHILS NFR BLD AUTO: 0.6 % — SIGNIFICANT CHANGE UP (ref 0–2)
EOSINOPHIL # BLD AUTO: 0.23 K/UL — SIGNIFICANT CHANGE UP (ref 0–0.5)
EOSINOPHIL NFR BLD AUTO: 4.2 % — SIGNIFICANT CHANGE UP (ref 0–6)
HCT VFR BLD CALC: 37.7 % — LOW (ref 39–50)
HGB BLD-MCNC: 13.2 G/DL — SIGNIFICANT CHANGE UP (ref 13–17)
IMM GRANULOCYTES NFR BLD AUTO: 0.6 % — SIGNIFICANT CHANGE UP (ref 0–1.5)
LYMPHOCYTES # BLD AUTO: 1.49 K/UL — SIGNIFICANT CHANGE UP (ref 1–3.3)
LYMPHOCYTES # BLD AUTO: 27.3 % — SIGNIFICANT CHANGE UP (ref 13–44)
MCHC RBC-ENTMCNC: 33.8 PG — SIGNIFICANT CHANGE UP (ref 27–34)
MCHC RBC-ENTMCNC: 35 GM/DL — SIGNIFICANT CHANGE UP (ref 32–36)
MCV RBC AUTO: 96.4 FL — SIGNIFICANT CHANGE UP (ref 80–100)
MONOCYTES # BLD AUTO: 0.38 K/UL — SIGNIFICANT CHANGE UP (ref 0–0.9)
MONOCYTES NFR BLD AUTO: 7 % — SIGNIFICANT CHANGE UP (ref 2–14)
NEUTROPHILS # BLD AUTO: 3.29 K/UL — SIGNIFICANT CHANGE UP (ref 1.8–7.4)
NEUTROPHILS NFR BLD AUTO: 60.3 % — SIGNIFICANT CHANGE UP (ref 43–77)
NRBC # BLD: 0 /100 WBCS — SIGNIFICANT CHANGE UP (ref 0–0)
PLATELET # BLD AUTO: 206 K/UL — SIGNIFICANT CHANGE UP (ref 150–400)
RBC # BLD: 3.91 M/UL — LOW (ref 4.2–5.8)
RBC # FLD: 11.8 % — SIGNIFICANT CHANGE UP (ref 10.3–14.5)
WBC # BLD: 5.45 K/UL — SIGNIFICANT CHANGE UP (ref 3.8–10.5)
WBC # FLD AUTO: 5.45 K/UL — SIGNIFICANT CHANGE UP (ref 3.8–10.5)

## 2020-08-26 ENCOUNTER — APPOINTMENT (OUTPATIENT)
Dept: HEMATOLOGY ONCOLOGY | Facility: CLINIC | Age: 35
End: 2020-08-26

## 2020-08-28 ENCOUNTER — APPOINTMENT (OUTPATIENT)
Dept: HEMATOLOGY ONCOLOGY | Facility: CLINIC | Age: 35
End: 2020-08-28
Payer: COMMERCIAL

## 2020-08-28 PROCEDURE — 99214 OFFICE O/P EST MOD 30 MIN: CPT | Mod: 95

## 2020-08-28 NOTE — HISTORY OF PRESENT ILLNESS
[Medical Office: ([facility])___] : at the medical office located in  [Home] : at home, [unfilled] , at the time of the visit. [Verbal consent obtained from patient] : the patient, [unfilled] [Disease:__________________________] : Disease: [unfilled] [de-identified] : 35 y/o M with no PMH presented to OSH originally for c/o dizziness and feeling unwell and was then transferred to [facility] for further hematologic evaluation as patient was found to be severely pancytopenic. Patient first started noticing scattered bruising throughout his body (mostly thighs and arms) about 6 weeks prior to presentation, and also had epistaxis. Pt had also noticed intermittent fevers and chills, responsive to tylenol, and noticed very dark stools for the same time period. Also with spontaneous gum bleeding and endorsed severe fatigue and SOB with exertion. Also noted 15 lbs weight loss, partially intentional over past 2-3 months prior to presentation.  Also c/o mild headache located in posterior aspect of his head, denies sore throat, cough, +nausea but no vomiting, no abdominal pain, no diarrhea, no BRBPR, no urinary symptoms, no LE swelling, +visual changes.     \par \par Patient was transferred to [facility] from [facility] on 4/18. Patient was started on IVF, Levaquin, and allopurinol. Patient transfused blood and platelets. Bone Marrow biopsy performed on 4/20 consistent with Aplastic Anemia. 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 Because of this, patient's platelet transfusion threshold was made 50k. Horse ATG test dose was applied 4/22 with no reaction. Treatment started 4/23 consisting of equine ATG at 40mg/kg/day x 4 days, Cyclosporine 10mg/kg/day divided doses BID, Eltrombopag 150mg daily. Patient had episode of hives with ATG infusion on Day 1 thus steroid regimen was changed from oral prednisone to IV Solumedrol Q8 for the duration of ATG treatment and transitioned back to oral prednisone 1mg/kg daily for days 5-10 then tapered. The patient had grade 3 transaminitis likely from Promacta and ATG and Promacta was held as of 5/1. While patient was in the hospital, his course was also complicated by epigastric pain consistent with dyspepsia, most likely due to cyclosporine. Was managed on pepcid BID and sucralfate q6 hours and mylanta as needed with good relief. \par \par On 5/12 the patient's transaminitis had improved(grade 2 total bili, grade 1 AST, grade 2 ALT) and Promacta 150 mg po QD was resumed. Pt's cyclosporine dose was adjusted  according to the level (goal 200-400) and he was on 400 mg po q12h as of 5/18. On 5/16, patient developed chest discomfort during platelet transfusion which improved with benadryl. EKG and CXR were wnl. Patient originally refused benadryl pre-medication prior to platelets that day due to side effects with taking Benadryl, and it was decided to premedicate pt with Claritin or Zyrtec pre blood transfusion instead. On 5/20, cyclosporine level was 460, therefore dose was lowered to 300 mg PO BID. Patient's vision had improved, so platelet transfusion threshold liberalized to 20k. \par \par Patient has since had a complete recovery of blood counts. \par  [de-identified] : Cytogenetics: \par \par Result: Male karyotype\par Karyotype: 46,XY     {12     } [de-identified] : - Markedly hypocellular bone marrow (mostly 5% and focal 10-\par 15%) with focal minimal erythropoiesis, minimal to absent\par myelopoiesis, absent megakaryocytes, increased iron stores. Aplastic bone marrow.  [Therapy: ___] : Therapy: [unfilled] [FreeTextEntry1] : Therapy initiated 4/23/2020 [de-identified] : very severe Aplastic Anemia [de-identified] : Patient has no complaints. His stomach no longer bothers him. Normal appetite. Normal energy levels.

## 2020-08-28 NOTE — ASSESSMENT
[FreeTextEntry1] : 33 y/o M presented April 2020 with aplastic anemia (very severe AA) confirmed on bone marrow biopsy at [facility] and now s/p inpatient treatment beginning 4/23/2020 with horse ATG + CsA + promacta with complete remission.\par \par -Hematologic parameters back to normal, excellent response to IST. No longer requiring any transfusional support. \par -CBC check monthly. \par -Currently on CsA 250 mg BID, trough levels have been therapeutic. Most recent trough measurement at 197, will repeat next week (prefer between 200-250). \par -Creatinine previously uptrending but now normal, likely was a result of supratherapeutic CsA level at that time.  \par - CsA and Promacta will be planned for 6 months, followed by a taper. This will be scheduled to end October 23rd 2020. \par -Instructed patient on COVID19 precautions, and educated on proper hand washing and social distancing. \par - All questions answered to the best of my ability and to the patient's apparent satisfaction.\par \par The visit was completed via tele-medicine visit due to the restrictions of the COVID-19 pandemic. All issues as above were discussed and addressed but no physical exam was performed. If it was felt that the patient should be evaluated in the clinic then they were directed there. The patient verbally consented to visit.\par \par

## 2020-08-28 NOTE — PHYSICAL EXAM
[Fully active, able to carry on all pre-disease performance without restriction] : Status 0 - Fully active, able to carry on all pre-disease performance without restriction [de-identified] : Cannot perform physical exam due to nature of telemedicine visit, however on telemedicine patient appears to not be in any acute distress

## 2020-08-28 NOTE — REVIEW OF SYSTEMS
[Chills] : no chills [Fever] : no fever [Fatigue] : no fatigue [Eye Pain] : no eye pain [Night Sweats] : no night sweats [Vision Problems] : no vision problems [Dysphagia] : no dysphagia [Nosebleeds] : no nosebleeds [Palpitations] : no palpitations [Chest Pain] : no chest pain [Odynophagia] : no odynophagia [Lower Ext Edema] : no lower extremity edema [Wheezing] : no wheezing [Cough] : no cough [Shortness Of Breath] : no shortness of breath [Abdominal Pain] : no abdominal pain [SOB on Exertion] : no shortness of breath during exertion [Diarrhea] : no diarrhea [Constipation] : no constipation [Vomiting] : no vomiting [Dysuria] : no dysuria [Joint Pain] : no joint pain [Joint Stiffness] : no joint stiffness [Skin Rash] : no skin rash [Anxiety] : no anxiety [Fainting] : no fainting [Dizziness] : no dizziness [Depression] : no depression [Easy Bleeding] : no tendency for easy bleeding [Easy Bruising] : no tendency for easy bruising

## 2020-09-04 ENCOUNTER — RESULT REVIEW (OUTPATIENT)
Age: 35
End: 2020-09-04

## 2020-09-04 ENCOUNTER — APPOINTMENT (OUTPATIENT)
Dept: HEMATOLOGY ONCOLOGY | Facility: CLINIC | Age: 35
End: 2020-09-04

## 2020-09-04 LAB
BASOPHILS # BLD AUTO: 0.01 K/UL — SIGNIFICANT CHANGE UP (ref 0–0.2)
BASOPHILS NFR BLD AUTO: 0.2 % — SIGNIFICANT CHANGE UP (ref 0–2)
EOSINOPHIL # BLD AUTO: 0.07 K/UL — SIGNIFICANT CHANGE UP (ref 0–0.5)
EOSINOPHIL NFR BLD AUTO: 1.4 % — SIGNIFICANT CHANGE UP (ref 0–6)
HCT VFR BLD CALC: 39.8 % — SIGNIFICANT CHANGE UP (ref 39–50)
HGB BLD-MCNC: 14 G/DL — SIGNIFICANT CHANGE UP (ref 13–17)
IMM GRANULOCYTES NFR BLD AUTO: 0.2 % — SIGNIFICANT CHANGE UP (ref 0–1.5)
LYMPHOCYTES # BLD AUTO: 1.39 K/UL — SIGNIFICANT CHANGE UP (ref 1–3.3)
LYMPHOCYTES # BLD AUTO: 27.3 % — SIGNIFICANT CHANGE UP (ref 13–44)
MCHC RBC-ENTMCNC: 33.6 PG — SIGNIFICANT CHANGE UP (ref 27–34)
MCHC RBC-ENTMCNC: 35.2 GM/DL — SIGNIFICANT CHANGE UP (ref 32–36)
MCV RBC AUTO: 95.4 FL — SIGNIFICANT CHANGE UP (ref 80–100)
MONOCYTES # BLD AUTO: 0.41 K/UL — SIGNIFICANT CHANGE UP (ref 0–0.9)
MONOCYTES NFR BLD AUTO: 8.1 % — SIGNIFICANT CHANGE UP (ref 2–14)
NEUTROPHILS # BLD AUTO: 3.2 K/UL — SIGNIFICANT CHANGE UP (ref 1.8–7.4)
NEUTROPHILS NFR BLD AUTO: 62.8 % — SIGNIFICANT CHANGE UP (ref 43–77)
NRBC # BLD: 0 /100 WBCS — SIGNIFICANT CHANGE UP (ref 0–0)
PLATELET # BLD AUTO: 205 K/UL — SIGNIFICANT CHANGE UP (ref 150–400)
RBC # BLD: 4.17 M/UL — LOW (ref 4.2–5.8)
RBC # FLD: 11.4 % — SIGNIFICANT CHANGE UP (ref 10.3–14.5)
WBC # BLD: 5.09 K/UL — SIGNIFICANT CHANGE UP (ref 3.8–10.5)
WBC # FLD AUTO: 5.09 K/UL — SIGNIFICANT CHANGE UP (ref 3.8–10.5)

## 2020-09-08 LAB — CYCLOSPORINE SER-MCNC: 212 NG/ML

## 2020-09-16 ENCOUNTER — OUTPATIENT (OUTPATIENT)
Dept: OUTPATIENT SERVICES | Facility: HOSPITAL | Age: 35
LOS: 1 days | Discharge: HOME | End: 2020-09-16

## 2020-09-16 DIAGNOSIS — D64.9 ANEMIA, UNSPECIFIED: ICD-10-CM

## 2020-09-16 DIAGNOSIS — Z98.890 OTHER SPECIFIED POSTPROCEDURAL STATES: Chronic | ICD-10-CM

## 2020-09-18 ENCOUNTER — APPOINTMENT (OUTPATIENT)
Dept: HEMATOLOGY ONCOLOGY | Facility: CLINIC | Age: 35
End: 2020-09-18

## 2020-09-18 ENCOUNTER — RESULT REVIEW (OUTPATIENT)
Age: 35
End: 2020-09-18

## 2020-09-18 LAB
BASOPHILS # BLD AUTO: 0.02 K/UL — SIGNIFICANT CHANGE UP (ref 0–0.2)
BASOPHILS NFR BLD AUTO: 0.4 % — SIGNIFICANT CHANGE UP (ref 0–2)
EOSINOPHIL # BLD AUTO: 0.07 K/UL — SIGNIFICANT CHANGE UP (ref 0–0.5)
EOSINOPHIL NFR BLD AUTO: 1.4 % — SIGNIFICANT CHANGE UP (ref 0–6)
HCT VFR BLD CALC: 39 % — SIGNIFICANT CHANGE UP (ref 39–50)
HGB BLD-MCNC: 13.7 G/DL — SIGNIFICANT CHANGE UP (ref 13–17)
IMM GRANULOCYTES NFR BLD AUTO: 0.6 % — SIGNIFICANT CHANGE UP (ref 0–1.5)
LYMPHOCYTES # BLD AUTO: 1.42 K/UL — SIGNIFICANT CHANGE UP (ref 1–3.3)
LYMPHOCYTES # BLD AUTO: 28.3 % — SIGNIFICANT CHANGE UP (ref 13–44)
MCHC RBC-ENTMCNC: 33 PG — SIGNIFICANT CHANGE UP (ref 27–34)
MCHC RBC-ENTMCNC: 35.1 G/DL — SIGNIFICANT CHANGE UP (ref 32–36)
MCV RBC AUTO: 94 FL — SIGNIFICANT CHANGE UP (ref 80–100)
MONOCYTES # BLD AUTO: 0.4 K/UL — SIGNIFICANT CHANGE UP (ref 0–0.9)
MONOCYTES NFR BLD AUTO: 8 % — SIGNIFICANT CHANGE UP (ref 2–14)
NEUTROPHILS # BLD AUTO: 3.08 K/UL — SIGNIFICANT CHANGE UP (ref 1.8–7.4)
NEUTROPHILS NFR BLD AUTO: 61.3 % — SIGNIFICANT CHANGE UP (ref 43–77)
NRBC # BLD: 0 /100 WBCS — SIGNIFICANT CHANGE UP (ref 0–0)
PLATELET # BLD AUTO: 209 K/UL — SIGNIFICANT CHANGE UP (ref 150–400)
RBC # BLD: 4.15 M/UL — LOW (ref 4.2–5.8)
RBC # FLD: 11.4 % — SIGNIFICANT CHANGE UP (ref 10.3–14.5)
WBC # BLD: 5.02 K/UL — SIGNIFICANT CHANGE UP (ref 3.8–10.5)
WBC # FLD AUTO: 5.02 K/UL — SIGNIFICANT CHANGE UP (ref 3.8–10.5)

## 2020-09-22 LAB
ALBUMIN SERPL ELPH-MCNC: 4.9 G/DL
ALP BLD-CCNC: 93 U/L
ALT SERPL-CCNC: 37 U/L
ANION GAP SERPL CALC-SCNC: 13 MMOL/L
AST SERPL-CCNC: 23 U/L
BILIRUB SERPL-MCNC: 0.6 MG/DL
BUN SERPL-MCNC: 19 MG/DL
CALCIUM SERPL-MCNC: 10 MG/DL
CHLORIDE SERPL-SCNC: 102 MMOL/L
CO2 SERPL-SCNC: 26 MMOL/L
CREAT SERPL-MCNC: 1.04 MG/DL
CYCLOSPORINE SER-MCNC: 205 NG/ML
GLUCOSE SERPL-MCNC: 116 MG/DL
LDH SERPL-CCNC: 200 U/L
POTASSIUM SERPL-SCNC: 4.1 MMOL/L
PROT SERPL-MCNC: 7.5 G/DL
SODIUM SERPL-SCNC: 141 MMOL/L

## 2020-09-25 ENCOUNTER — RESULT REVIEW (OUTPATIENT)
Age: 35
End: 2020-09-25

## 2020-09-25 ENCOUNTER — APPOINTMENT (OUTPATIENT)
Dept: HEMATOLOGY ONCOLOGY | Facility: CLINIC | Age: 35
End: 2020-09-25
Payer: MEDICAID

## 2020-09-25 VITALS
SYSTOLIC BLOOD PRESSURE: 146 MMHG | TEMPERATURE: 98 F | RESPIRATION RATE: 14 BRPM | OXYGEN SATURATION: 99 % | BODY MASS INDEX: 34.28 KG/M2 | HEART RATE: 75 BPM | WEIGHT: 231.49 LBS | DIASTOLIC BLOOD PRESSURE: 92 MMHG

## 2020-09-25 LAB
BASOPHILS # BLD AUTO: 0.02 K/UL — SIGNIFICANT CHANGE UP (ref 0–0.2)
BASOPHILS NFR BLD AUTO: 0.3 % — SIGNIFICANT CHANGE UP (ref 0–2)
EOSINOPHIL # BLD AUTO: 0.1 K/UL — SIGNIFICANT CHANGE UP (ref 0–0.5)
EOSINOPHIL NFR BLD AUTO: 1.7 % — SIGNIFICANT CHANGE UP (ref 0–6)
HCT VFR BLD CALC: 37.4 % — LOW (ref 39–50)
HGB BLD-MCNC: 13.1 G/DL — SIGNIFICANT CHANGE UP (ref 13–17)
IMM GRANULOCYTES NFR BLD AUTO: 0.7 % — SIGNIFICANT CHANGE UP (ref 0–1.5)
LYMPHOCYTES # BLD AUTO: 1.66 K/UL — SIGNIFICANT CHANGE UP (ref 1–3.3)
LYMPHOCYTES # BLD AUTO: 27.6 % — SIGNIFICANT CHANGE UP (ref 13–44)
MCHC RBC-ENTMCNC: 32.5 PG — SIGNIFICANT CHANGE UP (ref 27–34)
MCHC RBC-ENTMCNC: 35 G/DL — SIGNIFICANT CHANGE UP (ref 32–36)
MCV RBC AUTO: 92.8 FL — SIGNIFICANT CHANGE UP (ref 80–100)
MONOCYTES # BLD AUTO: 0.63 K/UL — SIGNIFICANT CHANGE UP (ref 0–0.9)
MONOCYTES NFR BLD AUTO: 10.5 % — SIGNIFICANT CHANGE UP (ref 2–14)
NEUTROPHILS # BLD AUTO: 3.57 K/UL — SIGNIFICANT CHANGE UP (ref 1.8–7.4)
NEUTROPHILS NFR BLD AUTO: 59.2 % — SIGNIFICANT CHANGE UP (ref 43–77)
NRBC # BLD: 0 /100 WBCS — SIGNIFICANT CHANGE UP (ref 0–0)
PLATELET # BLD AUTO: 213 K/UL — SIGNIFICANT CHANGE UP (ref 150–400)
RBC # BLD: 4.03 M/UL — LOW (ref 4.2–5.8)
RBC # FLD: 11.6 % — SIGNIFICANT CHANGE UP (ref 10.3–14.5)
WBC # BLD: 6.02 K/UL — SIGNIFICANT CHANGE UP (ref 3.8–10.5)
WBC # FLD AUTO: 6.02 K/UL — SIGNIFICANT CHANGE UP (ref 3.8–10.5)

## 2020-09-25 PROCEDURE — 99214 OFFICE O/P EST MOD 30 MIN: CPT

## 2020-09-25 NOTE — REVIEW OF SYSTEMS
[Fever] : no fever [Chills] : no chills [Night Sweats] : no night sweats [Fatigue] : no fatigue [Eye Pain] : no eye pain [Vision Problems] : no vision problems [Dysphagia] : no dysphagia [Nosebleeds] : no nosebleeds [Odynophagia] : no odynophagia [Chest Pain] : no chest pain [Palpitations] : no palpitations [Lower Ext Edema] : no lower extremity edema [Shortness Of Breath] : no shortness of breath [Wheezing] : no wheezing [Cough] : no cough [SOB on Exertion] : no shortness of breath during exertion [Abdominal Pain] : no abdominal pain [Vomiting] : no vomiting [Constipation] : no constipation [Diarrhea] : no diarrhea [Dysuria] : no dysuria [Joint Pain] : no joint pain [Joint Stiffness] : no joint stiffness [Skin Rash] : no skin rash [Dizziness] : no dizziness [Fainting] : no fainting [Anxiety] : no anxiety [Depression] : no depression [Easy Bleeding] : no tendency for easy bleeding [Easy Bruising] : no tendency for easy bruising

## 2020-09-25 NOTE — HISTORY OF PRESENT ILLNESS
[Disease:__________________________] : Disease: [unfilled] [Therapy: ___] : Therapy: [unfilled] [Home] : at home, [unfilled] , at the time of the visit. [Medical Office: ([facility])___] : at the medical office located in  [Verbal consent obtained from patient] : the patient, [unfilled] [de-identified] : 35 y/o M with no PMH presented to OSH originally for c/o dizziness and feeling unwell and was then transferred to [facility] for further hematologic evaluation as patient was found to be severely pancytopenic. Patient first started noticing scattered bruising throughout his body (mostly thighs and arms) about 6 weeks prior to presentation, and also had epistaxis. Pt had also noticed intermittent fevers and chills, responsive to tylenol, and noticed very dark stools for the same time period. Also with spontaneous gum bleeding and endorsed severe fatigue and SOB with exertion. Also noted 15 lbs weight loss, partially intentional over past 2-3 months prior to presentation.  Also c/o mild headache located in posterior aspect of his head, denies sore throat, cough, +nausea but no vomiting, no abdominal pain, no diarrhea, no BRBPR, no urinary symptoms, no LE swelling, +visual changes.     \par \par Patient was transferred to [facility] from [facility] on 4/18. Patient was started on IVF, Levaquin, and allopurinol. Patient transfused blood and platelets. Bone Marrow biopsy performed on 4/20 consistent with Aplastic Anemia. 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 Because of this, patient's platelet transfusion threshold was made 50k. Horse ATG test dose was applied 4/22 with no reaction. Treatment started 4/23 consisting of equine ATG at 40mg/kg/day x 4 days, Cyclosporine 10mg/kg/day divided doses BID, Eltrombopag 150mg daily. Patient had episode of hives with ATG infusion on Day 1 thus steroid regimen was changed from oral prednisone to IV Solumedrol Q8 for the duration of ATG treatment and transitioned back to oral prednisone 1mg/kg daily for days 5-10 then tapered. The patient had grade 3 transaminitis likely from Promacta and ATG and Promacta was held as of 5/1. While patient was in the hospital, his course was also complicated by epigastric pain consistent with dyspepsia, most likely due to cyclosporine. Was managed on pepcid BID and sucralfate q6 hours and mylanta as needed with good relief. \par \par On 5/12 the patient's transaminitis had improved(grade 2 total bili, grade 1 AST, grade 2 ALT) and Promacta 150 mg po QD was resumed. Pt's cyclosporine dose was adjusted  according to the level (goal 200-400) and he was on 400 mg po q12h as of 5/18. On 5/16, patient developed chest discomfort during platelet transfusion which improved with benadryl. EKG and CXR were wnl. Patient originally refused benadryl pre-medication prior to platelets that day due to side effects with taking Benadryl, and it was decided to premedicate pt with Claritin or Zyrtec pre blood transfusion instead. On 5/20, cyclosporine level was 460, therefore dose was lowered to 300 mg PO BID. Patient's vision had improved, so platelet transfusion threshold liberalized to 20k. \par \par Patient has since had a complete recovery of blood counts. \par  [de-identified] : - Markedly hypocellular bone marrow (mostly 5% and focal 10-\par 15%) with focal minimal erythropoiesis, minimal to absent\par myelopoiesis, absent megakaryocytes, increased iron stores. Aplastic bone marrow.  [de-identified] : Cytogenetics: \par \par Result: Male karyotype\par Karyotype: 46,XY      {12      } [de-identified] : very severe Aplastic Anemia [FreeTextEntry1] : Therapy initiated 4/23/2020 [de-identified] : Patient still taking both cyclosporine and promacta. Cyclosporine levels have been therapeutic. Patient reports that he feels OK now, he takes the sucralfate only as needed when he has abdominal pain which is extremely rare now. He denies any bleeding. He reports good appetite, no fevers or chills, and no further nausea symptoms. His energy level is good.

## 2020-09-25 NOTE — ASSESSMENT
[FreeTextEntry1] : 33 y/o M presented April 2020 with aplastic anemia (very severe AA) confirmed on bone marrow biopsy at [facility] and now s/p inpatient treatment beginning 4/23/2020 with horse ATG + CsA + promacta with complete remission.\par \par -Hematologic parameters back to normal, excellent response to IST. No longer requiring any transfusional support. \par -CBC check monthly. \par -Currently on CsA 250 mg BID, trough levels have been therapeutic. Continue current management with CsA for 12 months, at which time will begin taper (end of April 2021).  \par - Promacta was planned for 6 months of therapy. This will end next month. Will perform bone marrow biopsy for confirmation of remission. \par -Instructed patient on COVID19 precautions, and educated on proper hand washing and social distancing. \par - All questions answered to the best of my ability and to the patient's apparent satisfaction.\par \par

## 2020-09-30 LAB
ALBUMIN SERPL ELPH-MCNC: 4.8 G/DL
ALP BLD-CCNC: 90 U/L
ALT SERPL-CCNC: 25 U/L
ANION GAP SERPL CALC-SCNC: 15 MMOL/L
AST SERPL-CCNC: 18 U/L
BILIRUB SERPL-MCNC: 0.7 MG/DL
BUN SERPL-MCNC: 21 MG/DL
CALCIUM SERPL-MCNC: 9.7 MG/DL
CHLORIDE SERPL-SCNC: 99 MMOL/L
CO2 SERPL-SCNC: 26 MMOL/L
CREAT SERPL-MCNC: 1.03 MG/DL
CYCLOSPORINE SER-MCNC: 918 NG/ML
GLUCOSE SERPL-MCNC: 106 MG/DL
LDH SERPL-CCNC: 215 U/L
POTASSIUM SERPL-SCNC: 3.8 MMOL/L
PROT SERPL-MCNC: 7.4 G/DL
SODIUM SERPL-SCNC: 140 MMOL/L

## 2020-10-02 ENCOUNTER — RESULT REVIEW (OUTPATIENT)
Age: 35
End: 2020-10-02

## 2020-10-02 ENCOUNTER — APPOINTMENT (OUTPATIENT)
Dept: HEMATOLOGY ONCOLOGY | Facility: CLINIC | Age: 35
End: 2020-10-02

## 2020-10-02 LAB
BASOPHILS # BLD AUTO: 0.01 K/UL — SIGNIFICANT CHANGE UP (ref 0–0.2)
BASOPHILS NFR BLD AUTO: 0.2 % — SIGNIFICANT CHANGE UP (ref 0–2)
EOSINOPHIL # BLD AUTO: 0.07 K/UL — SIGNIFICANT CHANGE UP (ref 0–0.5)
EOSINOPHIL NFR BLD AUTO: 1.3 % — SIGNIFICANT CHANGE UP (ref 0–6)
HCT VFR BLD CALC: 38.2 % — LOW (ref 39–50)
HGB BLD-MCNC: 13.1 G/DL — SIGNIFICANT CHANGE UP (ref 13–17)
IMM GRANULOCYTES NFR BLD AUTO: 0.8 % — SIGNIFICANT CHANGE UP (ref 0–1.5)
LYMPHOCYTES # BLD AUTO: 1.46 K/UL — SIGNIFICANT CHANGE UP (ref 1–3.3)
LYMPHOCYTES # BLD AUTO: 27.8 % — SIGNIFICANT CHANGE UP (ref 13–44)
MCHC RBC-ENTMCNC: 32.3 PG — SIGNIFICANT CHANGE UP (ref 27–34)
MCHC RBC-ENTMCNC: 34.3 G/DL — SIGNIFICANT CHANGE UP (ref 32–36)
MCV RBC AUTO: 94.3 FL — SIGNIFICANT CHANGE UP (ref 80–100)
MONOCYTES # BLD AUTO: 0.36 K/UL — SIGNIFICANT CHANGE UP (ref 0–0.9)
MONOCYTES NFR BLD AUTO: 6.8 % — SIGNIFICANT CHANGE UP (ref 2–14)
NEUTROPHILS # BLD AUTO: 3.32 K/UL — SIGNIFICANT CHANGE UP (ref 1.8–7.4)
NEUTROPHILS NFR BLD AUTO: 63.1 % — SIGNIFICANT CHANGE UP (ref 43–77)
NRBC # BLD: 0 /100 WBCS — SIGNIFICANT CHANGE UP (ref 0–0)
PLATELET # BLD AUTO: 230 K/UL — SIGNIFICANT CHANGE UP (ref 150–400)
RBC # BLD: 4.05 M/UL — LOW (ref 4.2–5.8)
RBC # FLD: 11.9 % — SIGNIFICANT CHANGE UP (ref 10.3–14.5)
WBC # BLD: 5.26 K/UL — SIGNIFICANT CHANGE UP (ref 3.8–10.5)
WBC # FLD AUTO: 5.26 K/UL — SIGNIFICANT CHANGE UP (ref 3.8–10.5)

## 2020-10-05 ENCOUNTER — APPOINTMENT (OUTPATIENT)
Dept: FAMILY MEDICINE | Facility: CLINIC | Age: 35
End: 2020-10-05
Payer: COMMERCIAL

## 2020-10-05 VITALS
SYSTOLIC BLOOD PRESSURE: 120 MMHG | DIASTOLIC BLOOD PRESSURE: 84 MMHG | OXYGEN SATURATION: 98 % | BODY MASS INDEX: 33.95 KG/M2 | HEIGHT: 68 IN | HEART RATE: 85 BPM | WEIGHT: 224 LBS | TEMPERATURE: 98.7 F

## 2020-10-05 VITALS — SYSTOLIC BLOOD PRESSURE: 118 MMHG | DIASTOLIC BLOOD PRESSURE: 74 MMHG

## 2020-10-05 DIAGNOSIS — Z82.49 FAMILY HISTORY OF ISCHEMIC HEART DISEASE AND OTHER DISEASES OF THE CIRCULATORY SYSTEM: ICD-10-CM

## 2020-10-05 DIAGNOSIS — Z23 ENCOUNTER FOR IMMUNIZATION: ICD-10-CM

## 2020-10-05 DIAGNOSIS — Z13.21 ENCOUNTER FOR SCREENING FOR NUTRITIONAL DISORDER: ICD-10-CM

## 2020-10-05 DIAGNOSIS — Z00.00 ENCOUNTER FOR GENERAL ADULT MEDICAL EXAMINATION W/OUT ABNORMAL FINDINGS: ICD-10-CM

## 2020-10-05 PROCEDURE — 99385 PREV VISIT NEW AGE 18-39: CPT | Mod: 25

## 2020-10-05 PROCEDURE — 36415 COLL VENOUS BLD VENIPUNCTURE: CPT

## 2020-10-05 NOTE — HISTORY OF PRESENT ILLNESS
[FreeTextEntry1] : NP-CPE.  [de-identified] : CPE \par as above +ve FAST no CP/SOB c activity no dizziness no palpitations no N/V/D +BM qd NL no bloody/black stools \par no urinary complaints \par \par +ve CV exercise  2-3x/week  'hiking"

## 2020-10-05 NOTE — PHYSICAL EXAM
[No Acute Distress] : no acute distress [Well Nourished] : well nourished [Well Developed] : well developed [Well-Appearing] : well-appearing [EOMI] : extraocular movements intact [Normal Outer Ear/Nose] : the outer ears and nose were normal in appearance [No JVD] : no jugular venous distention [No Respiratory Distress] : no respiratory distress  [No Accessory Muscle Use] : no accessory muscle use [Clear to Auscultation] : lungs were clear to auscultation bilaterally [Normal Rate] : normal rate  [Regular Rhythm] : with a regular rhythm [Normal S1, S2] : normal S1 and S2 [No Carotid Bruits] : no carotid bruits [No Edema] : there was no peripheral edema [No Palpable Aorta] : no palpable aorta [No Extremity Clubbing/Cyanosis] : no extremity clubbing/cyanosis [Normal Appearance] : normal in appearance [Soft] : abdomen soft [Non Tender] : non-tender [Non-distended] : non-distended [No Masses] : no abdominal mass palpated [No HSM] : no HSM [Normal Bowel Sounds] : normal bowel sounds [Normal Posterior Cervical Nodes] : no posterior cervical lymphadenopathy [Normal Anterior Cervical Nodes] : no anterior cervical lymphadenopathy [No CVA Tenderness] : no CVA  tenderness [Grossly Normal Strength/Tone] : grossly normal strength/tone [No Rash] : no rash [Coordination Grossly Intact] : coordination grossly intact [No Focal Deficits] : no focal deficits [Normal Gait] : normal gait [Normal Affect] : the affect was normal [Normal Mood] : the mood was normal [Normal Insight/Judgement] : insight and judgment were intact

## 2020-10-05 NOTE — PLAN
[FreeTextEntry1] : cont'd f/u c Dr. Bradley Goldberg (Haem/Onc)  [facility] ([place])    f/u MONTHLY \par \par cont current meds \par \par see lab orders \par \par EKG UTD, medical record to be obtained

## 2020-10-07 LAB
25(OH)D3 SERPL-MCNC: 14.7 NG/ML
ALBUMIN SERPL ELPH-MCNC: 5 G/DL
ALP BLD-CCNC: 89 U/L
ALT SERPL-CCNC: 22 U/L
ANION GAP SERPL CALC-SCNC: 13 MMOL/L
APPEARANCE: CLEAR
AST SERPL-CCNC: 15 U/L
BACTERIA: NEGATIVE
BASOPHILS # BLD AUTO: 0.03 K/UL
BASOPHILS NFR BLD AUTO: 0.5 %
BILIRUB SERPL-MCNC: 0.8 MG/DL
BILIRUBIN URINE: NEGATIVE
BLOOD URINE: NEGATIVE
BUN SERPL-MCNC: 18 MG/DL
CALCIUM SERPL-MCNC: 9.6 MG/DL
CHLORIDE SERPL-SCNC: 101 MMOL/L
CHOLEST SERPL-MCNC: 273 MG/DL
CHOLEST/HDLC SERPL: 12.9 RATIO
CO2 SERPL-SCNC: 23 MMOL/L
COLOR: YELLOW
CREAT SERPL-MCNC: 1.13 MG/DL
EOSINOPHIL # BLD AUTO: 0.08 K/UL
EOSINOPHIL NFR BLD AUTO: 1.4 %
ESTIMATED AVERAGE GLUCOSE: 120 MG/DL
GLUCOSE QUALITATIVE U: NEGATIVE
GLUCOSE SERPL-MCNC: 99 MG/DL
HBA1C MFR BLD HPLC: 5.8 %
HCT VFR BLD CALC: 41.4 %
HDLC SERPL-MCNC: 21 MG/DL
HGB BLD-MCNC: 13.9 G/DL
HYALINE CASTS: 1 /LPF
IMM GRANULOCYTES NFR BLD AUTO: 0.9 %
KETONES URINE: NEGATIVE
LDLC SERPL CALC-MCNC: 195 MG/DL
LEUKOCYTE ESTERASE URINE: NEGATIVE
LYMPHOCYTES # BLD AUTO: 1.75 K/UL
LYMPHOCYTES NFR BLD AUTO: 30.4 %
MAN DIFF?: NORMAL
MCHC RBC-ENTMCNC: 31.9 PG
MCHC RBC-ENTMCNC: 33.6 GM/DL
MCV RBC AUTO: 95 FL
MICROSCOPIC-UA: NORMAL
MONOCYTES # BLD AUTO: 0.47 K/UL
MONOCYTES NFR BLD AUTO: 8.2 %
NEUTROPHILS # BLD AUTO: 3.37 K/UL
NEUTROPHILS NFR BLD AUTO: 58.6 %
NITRITE URINE: NEGATIVE
PH URINE: 6
PLATELET # BLD AUTO: 247 K/UL
POTASSIUM SERPL-SCNC: 4.3 MMOL/L
PROT SERPL-MCNC: 7.4 G/DL
PROTEIN URINE: NORMAL
RBC # BLD: 4.36 M/UL
RBC # FLD: 11.9 %
RED BLOOD CELLS URINE: 1 /HPF
SARS-COV-2 IGG SERPL IA-ACNC: 0.09 INDEX
SARS-COV-2 IGG SERPL QL IA: NEGATIVE
SODIUM SERPL-SCNC: 137 MMOL/L
SPECIFIC GRAVITY URINE: 1.03
SQUAMOUS EPITHELIAL CELLS: 0 /HPF
T3 SERPL-MCNC: 104 NG/DL
T4 SERPL-MCNC: 5.8 UG/DL
TRIGL SERPL-MCNC: 285 MG/DL
TSH SERPL-ACNC: 1.72 UIU/ML
URATE SERPL-MCNC: 6.8 MG/DL
UROBILINOGEN URINE: NORMAL
WBC # FLD AUTO: 5.75 K/UL
WHITE BLOOD CELLS URINE: 0 /HPF

## 2020-10-08 LAB
ALBUMIN SERPL ELPH-MCNC: 4.8 G/DL
ALP BLD-CCNC: 91 U/L
ALT SERPL-CCNC: 28 U/L
ANION GAP SERPL CALC-SCNC: 14 MMOL/L
AST SERPL-CCNC: 15 U/L
BILIRUB SERPL-MCNC: 0.6 MG/DL
BUN SERPL-MCNC: 22 MG/DL
CALCIUM SERPL-MCNC: 9.6 MG/DL
CHLORIDE SERPL-SCNC: 103 MMOL/L
CO2 SERPL-SCNC: 25 MMOL/L
CREAT SERPL-MCNC: 1.17 MG/DL
CYCLOSPORINE SER-MCNC: 228 NG/ML
GLUCOSE SERPL-MCNC: 118 MG/DL
POTASSIUM SERPL-SCNC: 4 MMOL/L
PROT SERPL-MCNC: 7.1 G/DL
SODIUM SERPL-SCNC: 142 MMOL/L

## 2020-10-16 ENCOUNTER — APPOINTMENT (OUTPATIENT)
Dept: HEMATOLOGY ONCOLOGY | Facility: CLINIC | Age: 35
End: 2020-10-16

## 2020-10-16 ENCOUNTER — RESULT REVIEW (OUTPATIENT)
Age: 35
End: 2020-10-16

## 2020-10-16 LAB
BASOPHILS # BLD AUTO: 0.02 K/UL — SIGNIFICANT CHANGE UP (ref 0–0.2)
BASOPHILS NFR BLD AUTO: 0.3 % — SIGNIFICANT CHANGE UP (ref 0–2)
EOSINOPHIL # BLD AUTO: 0.08 K/UL — SIGNIFICANT CHANGE UP (ref 0–0.5)
EOSINOPHIL NFR BLD AUTO: 1.3 % — SIGNIFICANT CHANGE UP (ref 0–6)
HCT VFR BLD CALC: 39.8 % — SIGNIFICANT CHANGE UP (ref 39–50)
HGB BLD-MCNC: 13.9 G/DL — SIGNIFICANT CHANGE UP (ref 13–17)
IMM GRANULOCYTES NFR BLD AUTO: 1 % — SIGNIFICANT CHANGE UP (ref 0–1.5)
LYMPHOCYTES # BLD AUTO: 1.79 K/UL — SIGNIFICANT CHANGE UP (ref 1–3.3)
LYMPHOCYTES # BLD AUTO: 29.8 % — SIGNIFICANT CHANGE UP (ref 13–44)
MCHC RBC-ENTMCNC: 32.6 PG — SIGNIFICANT CHANGE UP (ref 27–34)
MCHC RBC-ENTMCNC: 34.9 G/DL — SIGNIFICANT CHANGE UP (ref 32–36)
MCV RBC AUTO: 93.4 FL — SIGNIFICANT CHANGE UP (ref 80–100)
MONOCYTES # BLD AUTO: 0.54 K/UL — SIGNIFICANT CHANGE UP (ref 0–0.9)
MONOCYTES NFR BLD AUTO: 9 % — SIGNIFICANT CHANGE UP (ref 2–14)
NEUTROPHILS # BLD AUTO: 3.52 K/UL — SIGNIFICANT CHANGE UP (ref 1.8–7.4)
NEUTROPHILS NFR BLD AUTO: 58.6 % — SIGNIFICANT CHANGE UP (ref 43–77)
NRBC # BLD: 0 /100 WBCS — SIGNIFICANT CHANGE UP (ref 0–0)
PLATELET # BLD AUTO: 252 K/UL — SIGNIFICANT CHANGE UP (ref 150–400)
RBC # BLD: 4.26 M/UL — SIGNIFICANT CHANGE UP (ref 4.2–5.8)
RBC # FLD: 11.9 % — SIGNIFICANT CHANGE UP (ref 10.3–14.5)
WBC # BLD: 6.01 K/UL — SIGNIFICANT CHANGE UP (ref 3.8–10.5)
WBC # FLD AUTO: 6.01 K/UL — SIGNIFICANT CHANGE UP (ref 3.8–10.5)

## 2020-10-20 NOTE — ED PROVIDER NOTE - RATE
Please follow-up with your primary care doctor and/or the orthopedic surgeon for reassessment.  You may take acetaminophen (less than 3 g per 24 hours for symptom control).  Please use the knee brace for stability and a walker/cane if needed.  
134

## 2020-10-22 LAB
ALBUMIN SERPL ELPH-MCNC: 5.1 G/DL
ALP BLD-CCNC: 85 U/L
ALT SERPL-CCNC: 22 U/L
ANION GAP SERPL CALC-SCNC: 12 MMOL/L
AST SERPL-CCNC: 14 U/L
BILIRUB SERPL-MCNC: 0.8 MG/DL
BUN SERPL-MCNC: 21 MG/DL
CALCIUM SERPL-MCNC: 9.8 MG/DL
CHLORIDE SERPL-SCNC: 102 MMOL/L
CO2 SERPL-SCNC: 27 MMOL/L
CREAT SERPL-MCNC: 1.26 MG/DL
CYCLOSPORINE SER-MCNC: 252 NG/ML
GLUCOSE SERPL-MCNC: 104 MG/DL
POTASSIUM SERPL-SCNC: 4.6 MMOL/L
PROT SERPL-MCNC: 7.7 G/DL
SODIUM SERPL-SCNC: 141 MMOL/L
URATE SERPL-MCNC: 8.2 MG/DL

## 2020-10-26 ENCOUNTER — OUTPATIENT (OUTPATIENT)
Dept: OUTPATIENT SERVICES | Facility: HOSPITAL | Age: 35
LOS: 1 days | Discharge: HOME | End: 2020-10-26

## 2020-10-26 DIAGNOSIS — D64.9 ANEMIA, UNSPECIFIED: ICD-10-CM

## 2020-10-26 DIAGNOSIS — Z98.890 OTHER SPECIFIED POSTPROCEDURAL STATES: Chronic | ICD-10-CM

## 2020-10-30 ENCOUNTER — APPOINTMENT (OUTPATIENT)
Dept: HEMATOLOGY ONCOLOGY | Facility: CLINIC | Age: 35
End: 2020-10-30

## 2020-11-06 ENCOUNTER — OUTPATIENT (OUTPATIENT)
Dept: OUTPATIENT SERVICES | Facility: HOSPITAL | Age: 35
LOS: 1 days | End: 2020-11-06
Payer: COMMERCIAL

## 2020-11-06 DIAGNOSIS — D61.9 APLASTIC ANEMIA, UNSPECIFIED: ICD-10-CM

## 2020-11-06 DIAGNOSIS — Z98.890 OTHER SPECIFIED POSTPROCEDURAL STATES: Chronic | ICD-10-CM

## 2020-11-09 ENCOUNTER — RESULT REVIEW (OUTPATIENT)
Age: 35
End: 2020-11-09

## 2020-11-09 ENCOUNTER — LABORATORY RESULT (OUTPATIENT)
Age: 35
End: 2020-11-09

## 2020-11-09 ENCOUNTER — APPOINTMENT (OUTPATIENT)
Dept: HEMATOLOGY ONCOLOGY | Facility: CLINIC | Age: 35
End: 2020-11-09
Payer: COMMERCIAL

## 2020-11-09 VITALS
BODY MASS INDEX: 34.02 KG/M2 | SYSTOLIC BLOOD PRESSURE: 128 MMHG | TEMPERATURE: 98.1 F | OXYGEN SATURATION: 99 % | DIASTOLIC BLOOD PRESSURE: 84 MMHG | HEIGHT: 68.9 IN | RESPIRATION RATE: 14 BRPM | WEIGHT: 229.72 LBS | HEART RATE: 75 BPM

## 2020-11-09 VITALS
HEART RATE: 75 BPM | HEIGHT: 68.9 IN | DIASTOLIC BLOOD PRESSURE: 84 MMHG | WEIGHT: 229.72 LBS | SYSTOLIC BLOOD PRESSURE: 128 MMHG | TEMPERATURE: 98.1 F | BODY MASS INDEX: 34.02 KG/M2 | OXYGEN SATURATION: 99 % | RESPIRATION RATE: 14 BRPM

## 2020-11-09 LAB
BASOPHILS # BLD AUTO: 0.01 K/UL — SIGNIFICANT CHANGE UP (ref 0–0.2)
BASOPHILS NFR BLD AUTO: 0.2 % — SIGNIFICANT CHANGE UP (ref 0–2)
EOSINOPHIL # BLD AUTO: 0.07 K/UL — SIGNIFICANT CHANGE UP (ref 0–0.5)
EOSINOPHIL NFR BLD AUTO: 1.4 % — SIGNIFICANT CHANGE UP (ref 0–6)
HCT VFR BLD CALC: 37.6 % — LOW (ref 39–50)
HGB BLD-MCNC: 13.2 G/DL — SIGNIFICANT CHANGE UP (ref 13–17)
IMM GRANULOCYTES NFR BLD AUTO: 1 % — SIGNIFICANT CHANGE UP (ref 0–1.5)
LYMPHOCYTES # BLD AUTO: 1.73 K/UL — SIGNIFICANT CHANGE UP (ref 1–3.3)
LYMPHOCYTES # BLD AUTO: 33.7 % — SIGNIFICANT CHANGE UP (ref 13–44)
MCHC RBC-ENTMCNC: 32 PG — SIGNIFICANT CHANGE UP (ref 27–34)
MCHC RBC-ENTMCNC: 35.1 G/DL — SIGNIFICANT CHANGE UP (ref 32–36)
MCV RBC AUTO: 91.3 FL — SIGNIFICANT CHANGE UP (ref 80–100)
MONOCYTES # BLD AUTO: 0.46 K/UL — SIGNIFICANT CHANGE UP (ref 0–0.9)
MONOCYTES NFR BLD AUTO: 8.9 % — SIGNIFICANT CHANGE UP (ref 2–14)
NEUTROPHILS # BLD AUTO: 2.82 K/UL — SIGNIFICANT CHANGE UP (ref 1.8–7.4)
NEUTROPHILS NFR BLD AUTO: 54.8 % — SIGNIFICANT CHANGE UP (ref 43–77)
NRBC # BLD: 0 /100 WBCS — SIGNIFICANT CHANGE UP (ref 0–0)
PLATELET # BLD AUTO: 267 K/UL — SIGNIFICANT CHANGE UP (ref 150–400)
RBC # BLD: 4.12 M/UL — LOW (ref 4.2–5.8)
RBC # FLD: 12.5 % — SIGNIFICANT CHANGE UP (ref 10.3–14.5)
WBC # BLD: 5.14 K/UL — SIGNIFICANT CHANGE UP (ref 3.8–10.5)
WBC # FLD AUTO: 5.14 K/UL — SIGNIFICANT CHANGE UP (ref 3.8–10.5)

## 2020-11-09 PROCEDURE — 88264 CHROMOSOME ANALYSIS 20-25: CPT

## 2020-11-09 PROCEDURE — 88280 CHROMOSOME KARYOTYPE STUDY: CPT

## 2020-11-09 PROCEDURE — 99072 ADDL SUPL MATRL&STAF TM PHE: CPT

## 2020-11-09 PROCEDURE — 38222 DX BONE MARROW BX & ASPIR: CPT | Mod: RT

## 2020-11-09 PROCEDURE — 88237 TISSUE CULTURE BONE MARROW: CPT

## 2020-11-09 PROCEDURE — 99213 OFFICE O/P EST LOW 20 MIN: CPT | Mod: 25

## 2020-11-09 NOTE — PROCEDURE
[Bone Marrow Biopsy] : bone marrow biopsy [Bone Marrow Aspiration] : bone marrow aspiration  [Patient] : the patient [Patient identification verified] : patient identification verified [Procedure verified and consent obtained] : procedure verified and consent obtained [Correct positioning] : correct positioning [Prone] : prone [The right posterior iliac crest was prepped with betadine and draped, using sterile technique.] : The right posterior iliac crest was prepped with betadine and draped, using sterile technique. [Lidocaine was injected and into the periosteum overlying the site.] : Lidocaine was injected and into the periosteum overlying the site. [Aspirate] : aspirate [Cytogenetics] : cytogenetics [FISH] : FISH [Biopsy] : biopsy [Flow Cytometry] : flow cytometry [] : The patient was instructed to remove the bandage the following AM. The patient may bathe. Acetaminophen may be taken for discomfort, as per package directions.If there are any other problems, the patient was instructed to call the office. The patient verbalized understanding, and is aware of the office contact numbers. [FreeTextEntry1] : 26 yo male dxed w/ aplastic anemia s/p tx w/ horse ATG+CSA+promacta w/CR, BMBX to assess remission  [FreeTextEntry2] : CBC prior to procedure\par WBC 5.14\par Hgb  13.2 Hct 37.6\par Plt 267\par BM Bx and aspiration was performed by KISHA Tavarez. 2 lavender + 2 green top tubes of BM aspirate and 1 cassette of BM core specimen sent to lab.\par \par

## 2020-11-09 NOTE — HISTORY OF PRESENT ILLNESS
[Disease:__________________________] : Disease: [unfilled] [Therapy: ___] : Therapy: [unfilled] [de-identified] : 35 y/o M with no PMH presented to OSH originally for c/o dizziness and feeling unwell and was then transferred to [facility] for further hematologic evaluation as patient was found to be severely pancytopenic. Patient first started noticing scattered bruising throughout his body (mostly thighs and arms) about 6 weeks prior to presentation, and also had epistaxis. Pt had also noticed intermittent fevers and chills, responsive to tylenol, and noticed very dark stools for the same time period. Also with spontaneous gum bleeding and endorsed severe fatigue and SOB with exertion. Also noted 15 lbs weight loss, partially intentional over past 2-3 months prior to presentation.  Also c/o mild headache located in posterior aspect of his head, denies sore throat, cough, +nausea but no vomiting, no abdominal pain, no diarrhea, no BRBPR, no urinary symptoms, no LE swelling, +visual changes.     \par \par Patient was transferred to [facility] from [facility] on 4/18. Patient was started on IVF, Levaquin, and allopurinol. Patient transfused blood and platelets. Bone Marrow biopsy performed on 4/20 consistent with Aplastic Anemia. 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 Because of this, patient's platelet transfusion threshold was made 50k. Horse ATG test dose was applied 4/22 with no reaction. Treatment started 4/23 consisting of equine ATG at 40mg/kg/day x 4 days, Cyclosporine 10mg/kg/day divided doses BID, Eltrombopag 150mg daily. Patient had episode of hives with ATG infusion on Day 1 thus steroid regimen was changed from oral prednisone to IV Solumedrol Q8 for the duration of ATG treatment and transitioned back to oral prednisone 1mg/kg daily for days 5-10 then tapered. The patient had grade 3 transaminitis likely from Promacta and ATG and Promacta was held as of 5/1. While patient was in the hospital, his course was also complicated by epigastric pain consistent with dyspepsia, most likely due to cyclosporine. Was managed on pepcid BID and sucralfate q6 hours and mylanta as needed with good relief. \par \par On 5/12 the patient's transaminitis had improved(grade 2 total bili, grade 1 AST, grade 2 ALT) and Promacta 150 mg po QD was resumed. Pt's cyclosporine dose was adjusted  according to the level (goal 200-400) and he was on 400 mg po q12h as of 5/18. On 5/16, patient developed chest discomfort during platelet transfusion which improved with benadryl. EKG and CXR were wnl. Patient originally refused benadryl pre-medication prior to platelets that day due to side effects with taking Benadryl, and it was decided to premedicate pt with Claritin or Zyrtec pre blood transfusion instead. On 5/20, cyclosporine level was 460, therefore dose was lowered to 300 mg PO BID. Patient's vision had improved, so platelet transfusion threshold liberalized to 20k. \par \par Patient has since had a complete recovery of blood counts. \par  [de-identified] : - Markedly hypocellular bone marrow (mostly 5% and focal 10-\par 15%) with focal minimal erythropoiesis, minimal to absent\par myelopoiesis, absent megakaryocytes, increased iron stores. Aplastic bone marrow.  [de-identified] : Cytogenetics: \par \par Result: Male karyotype\par Karyotype: 46,XY       {12       } [de-identified] : very severe Aplastic Anemia [FreeTextEntry1] : Therapy initiated 4/23/2020 [de-identified] : Weird cramping feeling in R abdomen, not painful and not related to eating. No nausea or vomiting, no fevers or chills. Appetite is normal, weight is stable. No bleeding. No headaches dizziness or lightheadedness. Normal urination, normal bowel movements. No new skin rash. No shortness of breath or cough, no chest pain.

## 2020-11-09 NOTE — REASON FOR VISIT
[Bone Marrow Biopsy] : bone marrow biopsy [Bone Marrow Aspiration] : bone marrow aspiration [FreeTextEntry2] : 24 yo male dxed w/ aplastic anemia s/p tx w/ horse ATG+CSA+promacta w/CR, BMBX to assess remission

## 2020-11-09 NOTE — ASSESSMENT
[FreeTextEntry1] : 35 y/o M presented April 2020 with aplastic anemia (very severe AA) confirmed on bone marrow biopsy at [facility] and now s/p inpatient treatment beginning 4/23/2020 with horse ATG + CsA + promacta with complete remission.\par \par -Hematologic parameters back to normal, excellent response to IST. No longer requiring any transfusional support. \par -CBC check monthly. \par -Currently on CsA 250 mg BID, trough levels have been therapeutic. Continue current management with CsA for 12 months, at which time will begin taper (end of April 2021).  \par - Promacta was planned for 6 months of therapy. This is the point we are at now. Will plan for bone marrow biopsy, and if stable will discontinue Promacta and check CBC in 2 weeks. \par -Instructed patient on COVID19 precautions, and educated on proper hand washing and social distancing. \par - All questions answered to the best of my ability and to the patient's apparent satisfaction.\par \par

## 2020-11-11 LAB
ALBUMIN SERPL ELPH-MCNC: 4.8 G/DL
ALP BLD-CCNC: 81 U/L
ALT SERPL-CCNC: 26 U/L
ANION GAP SERPL CALC-SCNC: 12 MMOL/L
AST SERPL-CCNC: 15 U/L
BILIRUB SERPL-MCNC: 1 MG/DL
BUN SERPL-MCNC: 24 MG/DL
CALCIUM SERPL-MCNC: 9.4 MG/DL
CHLORIDE SERPL-SCNC: 101 MMOL/L
CO2 SERPL-SCNC: 25 MMOL/L
CREAT SERPL-MCNC: 1.23 MG/DL
CYCLOSPORINE SER-MCNC: 215 NG/ML
GLUCOSE SERPL-MCNC: 99 MG/DL
LDH SERPL-CCNC: 195 U/L
POTASSIUM SERPL-SCNC: 4.2 MMOL/L
PROT SERPL-MCNC: 7.2 G/DL
SODIUM SERPL-SCNC: 139 MMOL/L
URATE SERPL-MCNC: 8.2 MG/DL

## 2020-11-19 LAB — CHROM ANALY OVERALL INTERP SPEC-IMP: SIGNIFICANT CHANGE UP

## 2020-11-27 ENCOUNTER — OUTPATIENT (OUTPATIENT)
Dept: OUTPATIENT SERVICES | Facility: HOSPITAL | Age: 35
LOS: 1 days | Discharge: HOME | End: 2020-11-27

## 2020-11-27 DIAGNOSIS — Z98.890 OTHER SPECIFIED POSTPROCEDURAL STATES: Chronic | ICD-10-CM

## 2020-11-27 DIAGNOSIS — D64.9 ANEMIA, UNSPECIFIED: ICD-10-CM

## 2020-12-01 ENCOUNTER — RESULT REVIEW (OUTPATIENT)
Age: 35
End: 2020-12-01

## 2020-12-01 ENCOUNTER — APPOINTMENT (OUTPATIENT)
Dept: HEMATOLOGY ONCOLOGY | Facility: CLINIC | Age: 35
End: 2020-12-01
Payer: COMMERCIAL

## 2020-12-01 VITALS
HEART RATE: 85 BPM | BODY MASS INDEX: 33.96 KG/M2 | OXYGEN SATURATION: 98 % | SYSTOLIC BLOOD PRESSURE: 126 MMHG | RESPIRATION RATE: 16 BRPM | TEMPERATURE: 98.6 F | DIASTOLIC BLOOD PRESSURE: 82 MMHG | HEIGHT: 68.9 IN | WEIGHT: 229.28 LBS

## 2020-12-01 LAB
BASOPHILS # BLD AUTO: 0.02 K/UL — SIGNIFICANT CHANGE UP (ref 0–0.2)
BASOPHILS NFR BLD AUTO: 0.3 % — SIGNIFICANT CHANGE UP (ref 0–2)
EOSINOPHIL # BLD AUTO: 0.07 K/UL — SIGNIFICANT CHANGE UP (ref 0–0.5)
EOSINOPHIL NFR BLD AUTO: 1.1 % — SIGNIFICANT CHANGE UP (ref 0–6)
HCT VFR BLD CALC: 37.2 % — LOW (ref 39–50)
HGB BLD-MCNC: 13.2 G/DL — SIGNIFICANT CHANGE UP (ref 13–17)
IMM GRANULOCYTES NFR BLD AUTO: 0.5 % — SIGNIFICANT CHANGE UP (ref 0–1.5)
LYMPHOCYTES # BLD AUTO: 1.49 K/UL — SIGNIFICANT CHANGE UP (ref 1–3.3)
LYMPHOCYTES # BLD AUTO: 24.3 % — SIGNIFICANT CHANGE UP (ref 13–44)
MCHC RBC-ENTMCNC: 32.5 PG — SIGNIFICANT CHANGE UP (ref 27–34)
MCHC RBC-ENTMCNC: 35.5 G/DL — SIGNIFICANT CHANGE UP (ref 32–36)
MCV RBC AUTO: 91.6 FL — SIGNIFICANT CHANGE UP (ref 80–100)
MONOCYTES # BLD AUTO: 0.5 K/UL — SIGNIFICANT CHANGE UP (ref 0–0.9)
MONOCYTES NFR BLD AUTO: 8.2 % — SIGNIFICANT CHANGE UP (ref 2–14)
NEUTROPHILS # BLD AUTO: 4.01 K/UL — SIGNIFICANT CHANGE UP (ref 1.8–7.4)
NEUTROPHILS NFR BLD AUTO: 65.6 % — SIGNIFICANT CHANGE UP (ref 43–77)
NRBC # BLD: 0 /100 WBCS — SIGNIFICANT CHANGE UP (ref 0–0)
PLATELET # BLD AUTO: 305 K/UL — SIGNIFICANT CHANGE UP (ref 150–400)
RBC # BLD: 4.06 M/UL — LOW (ref 4.2–5.8)
RBC # FLD: 12.7 % — SIGNIFICANT CHANGE UP (ref 10.3–14.5)
WBC # BLD: 6.12 K/UL — SIGNIFICANT CHANGE UP (ref 3.8–10.5)
WBC # FLD AUTO: 6.12 K/UL — SIGNIFICANT CHANGE UP (ref 3.8–10.5)

## 2020-12-01 PROCEDURE — 99072 ADDL SUPL MATRL&STAF TM PHE: CPT

## 2020-12-01 PROCEDURE — 99214 OFFICE O/P EST MOD 30 MIN: CPT

## 2020-12-11 LAB
ALBUMIN SERPL ELPH-MCNC: 5.2 G/DL
ALP BLD-CCNC: 87 U/L
ALT SERPL-CCNC: 23 U/L
ANION GAP SERPL CALC-SCNC: 13 MMOL/L
AST SERPL-CCNC: 17 U/L
BILIRUB SERPL-MCNC: 1.1 MG/DL
BUN SERPL-MCNC: 23 MG/DL
CALCIUM SERPL-MCNC: 9.9 MG/DL
CHLORIDE SERPL-SCNC: 102 MMOL/L
CO2 SERPL-SCNC: 24 MMOL/L
CREAT SERPL-MCNC: 1.2 MG/DL
CYCLOSPORINE SER-MCNC: 854 NG/ML
GLUCOSE SERPL-MCNC: 114 MG/DL
LDH SERPL-CCNC: 210 U/L
POTASSIUM SERPL-SCNC: 4.2 MMOL/L
PROT SERPL-MCNC: 7.4 G/DL
SODIUM SERPL-SCNC: 139 MMOL/L
URATE SERPL-MCNC: 8.4 MG/DL

## 2020-12-15 ENCOUNTER — RESULT REVIEW (OUTPATIENT)
Age: 35
End: 2020-12-15

## 2020-12-15 ENCOUNTER — APPOINTMENT (OUTPATIENT)
Dept: HEMATOLOGY ONCOLOGY | Facility: CLINIC | Age: 35
End: 2020-12-15

## 2020-12-15 LAB
BASOPHILS # BLD AUTO: 0.03 K/UL — SIGNIFICANT CHANGE UP (ref 0–0.2)
BASOPHILS NFR BLD AUTO: 0.6 % — SIGNIFICANT CHANGE UP (ref 0–2)
EOSINOPHIL # BLD AUTO: 0.13 K/UL — SIGNIFICANT CHANGE UP (ref 0–0.5)
EOSINOPHIL NFR BLD AUTO: 2.4 % — SIGNIFICANT CHANGE UP (ref 0–6)
HCT VFR BLD CALC: 40.4 % — SIGNIFICANT CHANGE UP (ref 39–50)
HGB BLD-MCNC: 13.6 G/DL — SIGNIFICANT CHANGE UP (ref 13–17)
IMM GRANULOCYTES NFR BLD AUTO: 0.6 % — SIGNIFICANT CHANGE UP (ref 0–1.5)
LYMPHOCYTES # BLD AUTO: 1.57 K/UL — SIGNIFICANT CHANGE UP (ref 1–3.3)
LYMPHOCYTES # BLD AUTO: 28.9 % — SIGNIFICANT CHANGE UP (ref 13–44)
MCHC RBC-ENTMCNC: 31.8 PG — SIGNIFICANT CHANGE UP (ref 27–34)
MCHC RBC-ENTMCNC: 33.7 G/DL — SIGNIFICANT CHANGE UP (ref 32–36)
MCV RBC AUTO: 94.4 FL — SIGNIFICANT CHANGE UP (ref 80–100)
MONOCYTES # BLD AUTO: 0.52 K/UL — SIGNIFICANT CHANGE UP (ref 0–0.9)
MONOCYTES NFR BLD AUTO: 9.6 % — SIGNIFICANT CHANGE UP (ref 2–14)
NEUTROPHILS # BLD AUTO: 3.15 K/UL — SIGNIFICANT CHANGE UP (ref 1.8–7.4)
NEUTROPHILS NFR BLD AUTO: 57.9 % — SIGNIFICANT CHANGE UP (ref 43–77)
NRBC # BLD: 0 /100 WBCS — SIGNIFICANT CHANGE UP (ref 0–0)
PLATELET # BLD AUTO: 302 K/UL — SIGNIFICANT CHANGE UP (ref 150–400)
RBC # BLD: 4.28 M/UL — SIGNIFICANT CHANGE UP (ref 4.2–5.8)
RBC # FLD: 12.8 % — SIGNIFICANT CHANGE UP (ref 10.3–14.5)
WBC # BLD: 5.43 K/UL — SIGNIFICANT CHANGE UP (ref 3.8–10.5)
WBC # FLD AUTO: 5.43 K/UL — SIGNIFICANT CHANGE UP (ref 3.8–10.5)

## 2020-12-16 LAB
ALBUMIN SERPL ELPH-MCNC: 5 G/DL
ALP BLD-CCNC: 98 U/L
ALT SERPL-CCNC: 34 U/L
ANION GAP SERPL CALC-SCNC: 13 MMOL/L
AST SERPL-CCNC: 17 U/L
BILIRUB SERPL-MCNC: 1 MG/DL
BUN SERPL-MCNC: 19 MG/DL
CALCIUM SERPL-MCNC: 9.7 MG/DL
CHLORIDE SERPL-SCNC: 99 MMOL/L
CO2 SERPL-SCNC: 28 MMOL/L
CREAT SERPL-MCNC: 1.3 MG/DL
CYCLOSPORINE SER-MCNC: 270 NG/ML
GLUCOSE SERPL-MCNC: 101 MG/DL
LDH SERPL-CCNC: 199 U/L
POTASSIUM SERPL-SCNC: 4.1 MMOL/L
PROT SERPL-MCNC: 7.4 G/DL
SODIUM SERPL-SCNC: 140 MMOL/L
URATE SERPL-MCNC: 8.9 MG/DL

## 2020-12-17 NOTE — RESULTS/DATA
[FreeTextEntry1] : Labs on presentation:\par WBC 3.29\par Hb 3.6\par Hct 9.9\par Plt 2\par Abs Retic 6.8\par  (dropped to 70 the next day) \par \par \par ECHO 4/20/2020\par Conclusions:\par 1. Normal mitral valve. Minimal mitral regurgitation.\par 2. Normal trileaflet aortic valve. No aortic valve\par regurgitation seen.\par 3. Endocardial visualization enhanced with intravenous\par injection of Ultrasonic Enhancing Agent (Definity). Normal\par left ventricular systolic function. No segmental wall\par motion abnormalities. No left ventricular thrombus.\par 4. Normal diastolic function\par 5. Normal right ventricular size and function.\par *** No previous Echo exam.\par \par Bone Marrow Biopsy 11/9/20\par Hematopathology Report\par Final Diagnosis\par           1, 2, 3. Bone marrow biopsy, bone marrow clot, and bone marrow\par           aspirate\par - Regeneration marrow; hypocellular for age bone marrow (20-\par           50% cellularity) with trilineage maturation and slight myeloid\par           predominance.\par - Hematogones are present (by flow cytometry).\par           See note and description.\par Diagnostic note:\par           The current biopsy shows hypocellular bone marrow for age\par           withnormal myeloid to erythroid ratio. In comparison with\par           patients previous biopsy, the overall cellularity has improved\par           (ranging from 20-50%) showing marrow regeneration. Correlation\par           with ancillary cytogenetic studies and overall clinical history\par           is recommended. Comprehensive report with results of pending\par           ancillary studies to follow.\par \par Ancillary studies\par           Special stains:\par           Bone marrow aspirate iron stain: Iron stores are increased; no\par           ring sideroblasts are seen.\par Flow cytometry:  The myeloid immunophenotype shows a normal\par           myeloid blast population (<1% of total cells); there is normal\par           myeloid granularity, no increase in myeloid immaturity, and left\par           shifted myeloid antigen maturation pattern.\par           The lymphocyte immunophenotypic findings show no diagnostic\par           abnormalities. Hematogones are present.\par Immunohistochemical stains: (block 1A: CD34, ,\par           myeloperoxidase, CD71, E-cadherin, factor VIII, CD15, CD61) show\par           no significant increase in CD34 positive blasts (less than 1%),\par           myeloid predominance (positive with myeloperoxidase and CD15)\par           with normal proportion of erythroid elements (positive with\par           CD71), normal proportion of pronormoblasts (positive with E-\par           cadherin, ), and normal proportion of megakaryocytes\par           (positive with factor VIII, CD61).\par           Cytogenetics/FISH:  pending and will be reported in an addendum.\par  Microscopic description:\par           1, 2. Biopsy/clot:  Sections of bone marrow biopsy (with crush\par           and aspiration artefact) and bone marrow fragments in clot show\par           normocellularity (approximately 20-50% cellularity), trilineage\par           hematopoiesis, normal M:E ratio,megakaryocytes (predominately in the clot section) are normal in\par           number and morphology. Iron stores are present and increased.\par           Cortical bone is histopathologically unremarkable.\par \par           3. Aspirate:  A few hypocellular spicules are present for\par           evaluation.  Maturing and mature myeloid and erythroid elements\par           are present with normal M:E ratio (2.78:1). No dysplasia is\par           present or increase in blasts. Megakaryocytes appear normal in\par           morphology.\par Bone Marrow Aspirate Differential: (200 Cells).\par           Type            %    Normal*\par           Blast                1%   0-3\par           Neutrophil and\par              Precursors        61%  33-63\par           Eosinophil           0%   1-5\par           Basophil        0%   0-1\par           Pronormoblast        1%   0-2\par           Normoblast           21%  15-25\par           Monocyte        0%   0-2\par           Lymphocyte           15%  10-15\par           Plasma cell          0%   0-1\par                 *Adult Range\par \par           Peripheral smear evaluation: Mature and maturing myeloids and\par           rare lymphocytes.\par \par           Comment\par           Iron stain (examined to evaluate for iron stores; see microscopic\par           description) and Giemsa stain (shows appropriate\par           staining pattern) are performed and evaluated on block(s): 1A,\par           2A.

## 2020-12-17 NOTE — ASSESSMENT
[FreeTextEntry1] : 36 y/o M presented April 2020 with aplastic anemia (very severe AA) confirmed on bone marrow biopsy at [facility] and now s/p inpatient treatment beginning 4/23/2020 with horse ATG + CsA + promacta with complete remission. Bone marrow bx on 11/9/20 revealed hypocellular marrow, overall cellularity improved (20-50%) showing marrow regeneration. \par \par -Hematologic parameters back to normal, excellent response to IST. No longer requiring any transfusional support. \par -Currently on CsA 250 mg BID, trough levels have been therapeutic. Continue current management with CsA for 12 months, at which time will begin taper (end of April 2021).  \par - Promacta was planned for 6 months of therapy. This is the point we are at now. Counts stable will stop at this time. \par -Check CBC in 2 weeks\par \par -Follow up with Dr. Goldberg in 1 month\par \par

## 2020-12-17 NOTE — HISTORY OF PRESENT ILLNESS
[Disease:__________________________] : Disease: [unfilled] [Therapy: ___] : Therapy: [unfilled] [de-identified] : 33 y/o M with no PMH presented to OSH originally for c/o dizziness and feeling unwell and was then transferred to [facility] for further hematologic evaluation as patient was found to be severely pancytopenic. Patient first started noticing scattered bruising throughout his body (mostly thighs and arms) about 6 weeks prior to presentation, and also had epistaxis. Pt had also noticed intermittent fevers and chills, responsive to tylenol, and noticed very dark stools for the same time period. Also with spontaneous gum bleeding and endorsed severe fatigue and SOB with exertion. Also noted 15 lbs weight loss, partially intentional over past 2-3 months prior to presentation.  Also c/o mild headache located in posterior aspect of his head, denies sore throat, cough, +nausea but no vomiting, no abdominal pain, no diarrhea, no BRBPR, no urinary symptoms, no LE swelling, +visual changes.     \par \par Patient was transferred to [facility] from [facility] on 4/18. Patient was started on IVF, Levaquin, and allopurinol. Patient transfused blood and platelets. Bone Marrow biopsy performed on 4/20 consistent with Aplastic Anemia. 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 Because of this, patient's platelet transfusion threshold was made 50k. Horse ATG test dose was applied 4/22 with no reaction. Treatment started 4/23 consisting of equine ATG at 40mg/kg/day x 4 days, Cyclosporine 10mg/kg/day divided doses BID, Eltrombopag 150mg daily. Patient had episode of hives with ATG infusion on Day 1 thus steroid regimen was changed from oral prednisone to IV Solumedrol Q8 for the duration of ATG treatment and transitioned back to oral prednisone 1mg/kg daily for days 5-10 then tapered. The patient had grade 3 transaminitis likely from Promacta and ATG and Promacta was held as of 5/1. While patient was in the hospital, his course was also complicated by epigastric pain consistent with dyspepsia, most likely due to cyclosporine. Was managed on pepcid BID and sucralfate q6 hours and mylanta as needed with good relief. \par \par On 5/12 the patient's transaminitis had improved(grade 2 total bili, grade 1 AST, grade 2 ALT) and Promacta 150 mg po QD was resumed. Pt's cyclosporine dose was adjusted  according to the level (goal 200-400) and he was on 400 mg po q12h as of 5/18. On 5/16, patient developed chest discomfort during platelet transfusion which improved with benadryl. EKG and CXR were wnl. Patient originally refused benadryl pre-medication prior to platelets that day due to side effects with taking Benadryl, and it was decided to premedicate pt with Claritin or Zyrtec pre blood transfusion instead. On 5/20, cyclosporine level was 460, therefore dose was lowered to 300 mg PO BID. Patient's vision had improved, so platelet transfusion threshold liberalized to 20k. \par \par Patient has since had a complete recovery of blood counts. \par  [de-identified] : - Markedly hypocellular bone marrow (mostly 5% and focal 10-\par 15%) with focal minimal erythropoiesis, minimal to absent\par myelopoiesis, absent megakaryocytes, increased iron stores. Aplastic bone marrow.  [de-identified] : Cytogenetics: \par \par Result: Male karyotype\par Karyotype: 46,XY        {12        } [de-identified] : very severe Aplastic Anemia [FreeTextEntry1] : Therapy initiated 4/23/2020 [de-identified] : Weird cramping feeling in R side of abdomen, since BM bx, not painful. This has improved in frequency and intensity over the past few weeks. No nausea or vomiting, no fevers or chills. Appetite is normal, weight is stable. No bleeding. No headaches dizziness or lightheadedness. Normal urination, normal bowel movements. No new skin rash. No shortness of breath or cough, no chest pain.

## 2020-12-17 NOTE — REVIEW OF SYSTEMS
[Negative] : Heme/Lymph [Eye Pain] : no eye pain [Vision Problems] : no vision problems [Dysphagia] : no dysphagia [Nosebleeds] : no nosebleeds [Odynophagia] : no odynophagia [Chest Pain] : no chest pain [Palpitations] : no palpitations [Lower Ext Edema] : no lower extremity edema [Dysuria] : no dysuria [Joint Pain] : no joint pain [Joint Stiffness] : no joint stiffness [Muscle Pain] : no muscle pain [Dizziness] : no dizziness [Fainting] : no fainting [Insomnia] : no insomnia [Anxiety] : no anxiety [Hot Flashes] : no hot flashes

## 2020-12-17 NOTE — PHYSICAL EXAM
[Fully active, able to carry on all pre-disease performance without restriction] : Status 0 - Fully active, able to carry on all pre-disease performance without restriction [Normal] : affect appropriate [de-identified] : JEANETH [de-identified] : supple [de-identified] : (+) S1S2 RRR [de-identified] : no edema (+)pp [de-identified] : no tenderness [de-identified] : ROM intact [de-identified] : warm/dry [de-identified] : A&Ox3

## 2020-12-23 ENCOUNTER — OUTPATIENT (OUTPATIENT)
Dept: OUTPATIENT SERVICES | Facility: HOSPITAL | Age: 35
LOS: 1 days | Discharge: HOME | End: 2020-12-23

## 2020-12-23 DIAGNOSIS — D64.9 ANEMIA, UNSPECIFIED: ICD-10-CM

## 2020-12-23 DIAGNOSIS — Z98.890 OTHER SPECIFIED POSTPROCEDURAL STATES: Chronic | ICD-10-CM

## 2020-12-29 ENCOUNTER — RESULT REVIEW (OUTPATIENT)
Age: 35
End: 2020-12-29

## 2020-12-29 ENCOUNTER — APPOINTMENT (OUTPATIENT)
Dept: HEMATOLOGY ONCOLOGY | Facility: CLINIC | Age: 35
End: 2020-12-29
Payer: COMMERCIAL

## 2020-12-29 VITALS
HEART RATE: 95 BPM | OXYGEN SATURATION: 99 % | HEIGHT: 68.9 IN | SYSTOLIC BLOOD PRESSURE: 136 MMHG | RESPIRATION RATE: 16 BRPM | DIASTOLIC BLOOD PRESSURE: 85 MMHG | TEMPERATURE: 97.9 F | WEIGHT: 229.5 LBS | BODY MASS INDEX: 33.99 KG/M2

## 2020-12-29 LAB
BASOPHILS # BLD AUTO: 0.03 K/UL — SIGNIFICANT CHANGE UP (ref 0–0.2)
BASOPHILS NFR BLD AUTO: 0.5 % — SIGNIFICANT CHANGE UP (ref 0–2)
EOSINOPHIL # BLD AUTO: 0.1 K/UL — SIGNIFICANT CHANGE UP (ref 0–0.5)
EOSINOPHIL NFR BLD AUTO: 1.7 % — SIGNIFICANT CHANGE UP (ref 0–6)
HCT VFR BLD CALC: 37.8 % — LOW (ref 39–50)
HGB BLD-MCNC: 13.1 G/DL — SIGNIFICANT CHANGE UP (ref 13–17)
IMM GRANULOCYTES NFR BLD AUTO: 0.3 % — SIGNIFICANT CHANGE UP (ref 0–1.5)
LYMPHOCYTES # BLD AUTO: 1.34 K/UL — SIGNIFICANT CHANGE UP (ref 1–3.3)
LYMPHOCYTES # BLD AUTO: 23.3 % — SIGNIFICANT CHANGE UP (ref 13–44)
MCHC RBC-ENTMCNC: 32 PG — SIGNIFICANT CHANGE UP (ref 27–34)
MCHC RBC-ENTMCNC: 34.7 G/DL — SIGNIFICANT CHANGE UP (ref 32–36)
MCV RBC AUTO: 92.4 FL — SIGNIFICANT CHANGE UP (ref 80–100)
MONOCYTES # BLD AUTO: 0.4 K/UL — SIGNIFICANT CHANGE UP (ref 0–0.9)
MONOCYTES NFR BLD AUTO: 7 % — SIGNIFICANT CHANGE UP (ref 2–14)
NEUTROPHILS # BLD AUTO: 3.86 K/UL — SIGNIFICANT CHANGE UP (ref 1.8–7.4)
NEUTROPHILS NFR BLD AUTO: 67.2 % — SIGNIFICANT CHANGE UP (ref 43–77)
NRBC # BLD: 0 /100 WBCS — SIGNIFICANT CHANGE UP (ref 0–0)
PLATELET # BLD AUTO: 264 K/UL — SIGNIFICANT CHANGE UP (ref 150–400)
RBC # BLD: 4.09 M/UL — LOW (ref 4.2–5.8)
RBC # FLD: 12.1 % — SIGNIFICANT CHANGE UP (ref 10.3–14.5)
WBC # BLD: 5.75 K/UL — SIGNIFICANT CHANGE UP (ref 3.8–10.5)
WBC # FLD AUTO: 5.75 K/UL — SIGNIFICANT CHANGE UP (ref 3.8–10.5)

## 2020-12-29 PROCEDURE — 99072 ADDL SUPL MATRL&STAF TM PHE: CPT

## 2020-12-29 PROCEDURE — 99214 OFFICE O/P EST MOD 30 MIN: CPT

## 2020-12-29 RX ORDER — ELTROMBOPAG OLAMINE 25 MG/1
25 TABLET, FILM COATED ORAL DAILY
Qty: 180 | Refills: 2 | Status: DISCONTINUED | COMMUNITY
End: 2020-12-29

## 2020-12-30 LAB
ALBUMIN SERPL ELPH-MCNC: 4.7 G/DL
ALP BLD-CCNC: 87 U/L
ALT SERPL-CCNC: 30 U/L
ANION GAP SERPL CALC-SCNC: 12 MMOL/L
AST SERPL-CCNC: 18 U/L
BILIRUB SERPL-MCNC: 0.6 MG/DL
BUN SERPL-MCNC: 17 MG/DL
CALCIUM SERPL-MCNC: 9.5 MG/DL
CHLORIDE SERPL-SCNC: 103 MMOL/L
CO2 SERPL-SCNC: 25 MMOL/L
CREAT SERPL-MCNC: 1.25 MG/DL
CYCLOSPORINE SER-MCNC: 255 NG/ML
GLUCOSE SERPL-MCNC: 101 MG/DL
LDH SERPL-CCNC: 197 U/L
POTASSIUM SERPL-SCNC: 4.3 MMOL/L
PROT SERPL-MCNC: 7.1 G/DL
SODIUM SERPL-SCNC: 140 MMOL/L
URATE SERPL-MCNC: 8.3 MG/DL

## 2020-12-30 NOTE — HISTORY OF PRESENT ILLNESS
[Disease:__________________________] : Disease: [unfilled] [Therapy: ___] : Therapy: [unfilled] [de-identified] : 33 y/o M with no PMH presented to OSH originally for c/o dizziness and feeling unwell and was then transferred to [facility] for further hematologic evaluation as patient was found to be severely pancytopenic. Patient first started noticing scattered bruising throughout his body (mostly thighs and arms) about 6 weeks prior to presentation, and also had epistaxis. Pt had also noticed intermittent fevers and chills, responsive to tylenol, and noticed very dark stools for the same time period. Also with spontaneous gum bleeding and endorsed severe fatigue and SOB with exertion. Also noted 15 lbs weight loss, partially intentional over past 2-3 months prior to presentation.  Also c/o mild headache located in posterior aspect of his head, denies sore throat, cough, +nausea but no vomiting, no abdominal pain, no diarrhea, no BRBPR, no urinary symptoms, no LE swelling, +visual changes.     \par \par Patient was transferred to [facility] from [facility] on 4/18. Patient was started on IVF, Levaquin, and allopurinol. Patient transfused blood and platelets. Bone Marrow biopsy performed on 4/20 consistent with Aplastic Anemia. 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 Because of this, patient's platelet transfusion threshold was made 50k. Horse ATG test dose was applied 4/22 with no reaction. Treatment started 4/23 consisting of equine ATG at 40mg/kg/day x 4 days, Cyclosporine 10mg/kg/day divided doses BID, Eltrombopag 150mg daily. Patient had episode of hives with ATG infusion on Day 1 thus steroid regimen was changed from oral prednisone to IV Solumedrol Q8 for the duration of ATG treatment and transitioned back to oral prednisone 1mg/kg daily for days 5-10 then tapered. The patient had grade 3 transaminitis likely from Promacta and ATG and Promacta was held as of 5/1. While patient was in the hospital, his course was also complicated by epigastric pain consistent with dyspepsia, most likely due to cyclosporine. Was managed on pepcid BID and sucralfate q6 hours and mylanta as needed with good relief. \par \par On 5/12 the patient's transaminitis had improved(grade 2 total bili, grade 1 AST, grade 2 ALT) and Promacta 150 mg po QD was resumed. Pt's cyclosporine dose was adjusted  according to the level (goal 200-400) and he was on 400 mg po q12h as of 5/18. On 5/16, patient developed chest discomfort during platelet transfusion which improved with benadryl. EKG and CXR were wnl. Patient originally refused benadryl pre-medication prior to platelets that day due to side effects with taking Benadryl, and it was decided to premedicate pt with Claritin or Zyrtec pre blood transfusion instead. On 5/20, cyclosporine level was 460, therefore dose was lowered to 300 mg PO BID. Patient's vision had improved, so platelet transfusion threshold liberalized to 20k. \par \par Patient has since had a complete recovery of blood counts. \par  [de-identified] : - Markedly hypocellular bone marrow (mostly 5% and focal 10-\par 15%) with focal minimal erythropoiesis, minimal to absent\par myelopoiesis, absent megakaryocytes, increased iron stores. Aplastic bone marrow.  [de-identified] : Cytogenetics: \par \par Result: Male karyotype\par Karyotype: 46,XY         {12         } [de-identified] : very severe Aplastic Anemia [FreeTextEntry1] : Therapy initiated 4/23/2020 [de-identified] : Feeling well, no longer has cramping abdominal pain. No nausea or vomiting, fevers or chills. Appetite is normal, weight is stable. No bleeding. No headaches dizziness or lightheadedness. Normal urination, normal bowel movements. No new skin rash. No shortness of breath or cough, no chest pain.

## 2020-12-30 NOTE — ASSESSMENT
[FreeTextEntry1] : 34 y/o M presented April 2020 with aplastic anemia (very severe AA) confirmed on bone marrow biopsy at [facility] and now s/p inpatient treatment beginning 4/23/2020 with horse ATG + CsA + promacta with complete remission. Bone marrow bx on 11/9/20 revealed hypocellular marrow, overall cellularity improved (20-50%) showing marrow regeneration. \par \par -Hematologic parameters back to normal, excellent response to IST. No longer requiring any transfusional support. \par -Currently on CsA 250 mg BID, trough levels have been therapeutic with the exception of one level that was elevated due to patient taking dose prior to visit. Patient held dose today as previously instructed for accurate level. Continue current management with CsA for 12 months, at which time will begin taper (end of April 2021).\par -Promacta was planned for 6 months of therapy, this had been discontinued earlier this month. Follow up CBC showed stable counts. \par -Continue Acyclovir while on CSA\par \par -Follow up with Dr. Goldberg in 1 month\par \par

## 2020-12-30 NOTE — PHYSICAL EXAM
[Fully active, able to carry on all pre-disease performance without restriction] : Status 0 - Fully active, able to carry on all pre-disease performance without restriction [Normal] : affect appropriate [de-identified] : JEANETH [de-identified] : supple [de-identified] : (+) S1S2 RRR [de-identified] : no edema (+)pp [de-identified] : no tenderness [de-identified] : ROM intact [de-identified] : warm/dry [de-identified] : A&Ox3

## 2020-12-30 NOTE — REVIEW OF SYSTEMS
[Negative] : Heme/Lymph [Eye Pain] : no eye pain [Vision Problems] : no vision problems [Dysphagia] : no dysphagia [Nosebleeds] : no nosebleeds [Odynophagia] : no odynophagia [Chest Pain] : no chest pain [Palpitations] : no palpitations [Lower Ext Edema] : no lower extremity edema [Joint Pain] : no joint pain [Joint Stiffness] : no joint stiffness [Muscle Pain] : no muscle pain [Dizziness] : no dizziness [Fainting] : no fainting [Insomnia] : no insomnia [Anxiety] : no anxiety [Hot Flashes] : no hot flashes

## 2021-01-08 ENCOUNTER — LABORATORY RESULT (OUTPATIENT)
Age: 36
End: 2021-01-08

## 2021-01-11 ENCOUNTER — EMERGENCY (EMERGENCY)
Facility: HOSPITAL | Age: 36
LOS: 1 days | Discharge: HOME | End: 2021-01-11
Attending: EMERGENCY MEDICINE
Payer: COMMERCIAL

## 2021-01-11 ENCOUNTER — NON-APPOINTMENT (OUTPATIENT)
Age: 36
End: 2021-01-11

## 2021-01-11 VITALS — RESPIRATION RATE: 18 BRPM | OXYGEN SATURATION: 99 %

## 2021-01-11 VITALS
WEIGHT: 229.06 LBS | DIASTOLIC BLOOD PRESSURE: 105 MMHG | HEIGHT: 69 IN | HEART RATE: 93 BPM | SYSTOLIC BLOOD PRESSURE: 147 MMHG | RESPIRATION RATE: 18 BRPM | OXYGEN SATURATION: 99 % | TEMPERATURE: 99 F

## 2021-01-11 DIAGNOSIS — Z98.890 OTHER SPECIFIED POSTPROCEDURAL STATES: Chronic | ICD-10-CM

## 2021-01-11 LAB
ALBUMIN SERPL ELPH-MCNC: 4.7 G/DL — SIGNIFICANT CHANGE UP (ref 3.3–5)
ALP SERPL-CCNC: 83 U/L — SIGNIFICANT CHANGE UP (ref 40–120)
ALT FLD-CCNC: 33 U/L — SIGNIFICANT CHANGE UP (ref 10–45)
ANION GAP SERPL CALC-SCNC: 12 MMOL/L — SIGNIFICANT CHANGE UP (ref 5–17)
AST SERPL-CCNC: 24 U/L — SIGNIFICANT CHANGE UP (ref 10–40)
BASOPHILS # BLD AUTO: 0.01 K/UL — SIGNIFICANT CHANGE UP (ref 0–0.2)
BASOPHILS NFR BLD AUTO: 0.1 % — SIGNIFICANT CHANGE UP (ref 0–2)
BILIRUB SERPL-MCNC: 0.7 MG/DL — SIGNIFICANT CHANGE UP (ref 0.2–1.2)
BUN SERPL-MCNC: 19 MG/DL — SIGNIFICANT CHANGE UP (ref 7–23)
CALCIUM SERPL-MCNC: 9.4 MG/DL — SIGNIFICANT CHANGE UP (ref 8.4–10.5)
CHLORIDE SERPL-SCNC: 101 MMOL/L — SIGNIFICANT CHANGE UP (ref 96–108)
CO2 SERPL-SCNC: 26 MMOL/L — SIGNIFICANT CHANGE UP (ref 22–31)
CREAT SERPL-MCNC: 1.03 MG/DL — SIGNIFICANT CHANGE UP (ref 0.5–1.3)
EOSINOPHIL # BLD AUTO: 0.04 K/UL — SIGNIFICANT CHANGE UP (ref 0–0.5)
EOSINOPHIL NFR BLD AUTO: 0.6 % — SIGNIFICANT CHANGE UP (ref 0–6)
GLUCOSE SERPL-MCNC: 90 MG/DL — SIGNIFICANT CHANGE UP (ref 70–99)
HCT VFR BLD CALC: 38.9 % — LOW (ref 39–50)
HGB BLD-MCNC: 13.5 G/DL — SIGNIFICANT CHANGE UP (ref 13–17)
IMM GRANULOCYTES NFR BLD AUTO: 0.4 % — SIGNIFICANT CHANGE UP (ref 0–1.5)
LYMPHOCYTES # BLD AUTO: 1.59 K/UL — SIGNIFICANT CHANGE UP (ref 1–3.3)
LYMPHOCYTES # BLD AUTO: 23.6 % — SIGNIFICANT CHANGE UP (ref 13–44)
MCHC RBC-ENTMCNC: 31.8 PG — SIGNIFICANT CHANGE UP (ref 27–34)
MCHC RBC-ENTMCNC: 34.7 GM/DL — SIGNIFICANT CHANGE UP (ref 32–36)
MCV RBC AUTO: 91.7 FL — SIGNIFICANT CHANGE UP (ref 80–100)
MONOCYTES # BLD AUTO: 0.5 K/UL — SIGNIFICANT CHANGE UP (ref 0–0.9)
MONOCYTES NFR BLD AUTO: 7.4 % — SIGNIFICANT CHANGE UP (ref 2–14)
NEUTROPHILS # BLD AUTO: 4.56 K/UL — SIGNIFICANT CHANGE UP (ref 1.8–7.4)
NEUTROPHILS NFR BLD AUTO: 67.9 % — SIGNIFICANT CHANGE UP (ref 43–77)
NRBC # BLD: 0 /100 WBCS — SIGNIFICANT CHANGE UP (ref 0–0)
PLATELET # BLD AUTO: 187 K/UL — SIGNIFICANT CHANGE UP (ref 150–400)
POTASSIUM SERPL-MCNC: 4.1 MMOL/L — SIGNIFICANT CHANGE UP (ref 3.5–5.3)
POTASSIUM SERPL-SCNC: 4.1 MMOL/L — SIGNIFICANT CHANGE UP (ref 3.5–5.3)
PROT SERPL-MCNC: 7.8 G/DL — SIGNIFICANT CHANGE UP (ref 6–8.3)
RBC # BLD: 4.24 M/UL — SIGNIFICANT CHANGE UP (ref 4.2–5.8)
RBC # FLD: 12 % — SIGNIFICANT CHANGE UP (ref 10.3–14.5)
SODIUM SERPL-SCNC: 139 MMOL/L — SIGNIFICANT CHANGE UP (ref 135–145)
WBC # BLD: 6.73 K/UL — SIGNIFICANT CHANGE UP (ref 3.8–10.5)
WBC # FLD AUTO: 6.73 K/UL — SIGNIFICANT CHANGE UP (ref 3.8–10.5)

## 2021-01-11 PROCEDURE — 99284 EMERGENCY DEPT VISIT MOD MDM: CPT

## 2021-01-11 PROCEDURE — 99283 EMERGENCY DEPT VISIT LOW MDM: CPT

## 2021-01-11 PROCEDURE — 85025 COMPLETE CBC W/AUTO DIFF WBC: CPT

## 2021-01-11 PROCEDURE — 80158 DRUG ASSAY CYCLOSPORINE: CPT

## 2021-01-11 PROCEDURE — 80053 COMPREHEN METABOLIC PANEL: CPT

## 2021-01-11 RX ORDER — SODIUM CHLORIDE 9 MG/ML
1000 INJECTION INTRAMUSCULAR; INTRAVENOUS; SUBCUTANEOUS ONCE
Refills: 0 | Status: COMPLETED | OUTPATIENT
Start: 2021-01-11 | End: 2021-01-11

## 2021-01-11 RX ADMIN — SODIUM CHLORIDE 1000 MILLILITER(S): 9 INJECTION INTRAMUSCULAR; INTRAVENOUS; SUBCUTANEOUS at 17:10

## 2021-01-11 NOTE — ED PROVIDER NOTE - NS_ ATTENDINGSCRIBEDETAILS _ED_A_ED_FT
I performed a history and physical exam of the patient and discussed their management with the resident. I reviewed the scribe's note and agree with the documented findings and plan of care.  Janette Saucedo MD

## 2021-01-11 NOTE — ED ADULT TRIAGE NOTE - CHIEF COMPLAINT QUOTE
pt hx of aplastic anemia positive for Covid sent in for renal failure ]e and fluids pt hx of aplastic anemia positive for Covid sent in for renal failure  and fluids

## 2021-01-11 NOTE — ED PROVIDER NOTE - PROGRESS NOTE DETAILS
Pablo Garcia, PGY-1- Patient monitored in ED. Received IVF. Labs WNL. Cyclosporine level as requested by Heme/Onc is pending. Patient aware of this. Given copy of labs to follow up out patient with. All questions answered regarding plans. Discussed results of work up with patient. Patient agrees with plan to discharge home. Strict return precautions given.

## 2021-01-11 NOTE — ED PROVIDER NOTE - OBJECTIVE STATEMENT
Janette Saucedo (MD): 35y M with PMHx of aplastic anemia dx 2020 has been doing well until Friday when he had a fever, tmax 101. Pt went to the Dr and was found to be covid positive. BW was done and told today to come to ED for elevated creatinine 1.6. Pt has had no symptoms since Friday. Denies f/c, sore throat, cough, urinary symptoms.

## 2021-01-11 NOTE — ED PROVIDER NOTE - CLINICAL SUMMARY MEDICAL DECISION MAKING FREE TEXT BOX
Janette Saucedo (MD): 35y M with hx of aplastic anemia, covid pos 1/8/21, here for elevated creatinine with no symptoms. Will repeat labs, IV hydration, contact hematology oncology and reassess. Janette Saucedo (MD): 35y M with hx of aplastic anemia, covid pos 1/8/21, here for elevated creatinine with no symptoms. Will repeat labs, IV hydration, contact hematology oncology and reassess. Labs WNL. Cyclosporine level pending

## 2021-01-11 NOTE — ED CLERICAL - NS ED CLERK NOTE PRE-ARRIVAL INFORMATION; ADDITIONAL PRE-ARRIVAL INFORMATION
CC/Reason For referral: hx aplastic anemia, elevated creat - COVID POSITIVE 1/7  Preferred Consultant(if applicable):  Who admits for you (if needed):  Do you have documents you would like to fax over? NO  Would you still like to speak to an ED attending? YES

## 2021-01-11 NOTE — ED ADULT NURSE NOTE - NSIMPLEMENTINTERV_GEN_ALL_ED
Implemented All Universal Safety Interventions:  [place]er to call system. Call bell, personal items and telephone within reach. Instruct patient to call for assistance. Room bathroom lighting operational. Non-slip footwear when patient is off stretcher. Physically safe environment: no spills, clutter or unnecessary equipment. Stretcher in lowest position, wheels locked, appropriate side rails in place.

## 2021-01-11 NOTE — ED PROVIDER NOTE - PATIENT PORTAL LINK FT
You can access the FollowMyHealth Patient Portal offered by [facility] by registering at the following website: [email or web address]. By joining [employer]’s FollowMyHealth portal, you will also be able to view your health information using other applications (apps) compatible with our system.

## 2021-01-11 NOTE — ED PROVIDER NOTE - NSFOLLOWUPINSTRUCTIONS_ED_ALL_ED_FT
1) Follow up with your doctor within 1 week of being evaluated in the Emergency Department   2) Return to the ED immediately for new or worsening symptoms difficulty breathing, chest pain, shortness of breath, fevers unable to be controlled with Tylenol/Motrin, decrease urine out put   3) Please continue to take any home medications as prescribed  4) Your test results from your ED visit were discussed with you prior to discharge  5) You were provided with a copy of your test results

## 2021-01-11 NOTE — ED PROVIDER NOTE - ATTENDING CONTRIBUTION TO CARE
I performed a history and physical exam of the patient and discussed their management with the resident. I reviewed the resident's note and agree with the documented findings and plan of care.  Janette Saucedo MD

## 2021-01-11 NOTE — ED ADULT NURSE NOTE - OBJECTIVE STATEMENT
pt here for covid + and high creatine of 1.6  pt appears well and "feels fine"  sats are above 94% sitting and walking  denies fever

## 2021-01-12 LAB — CYCLOSPORINE SER-MCNC: 444 NG/ML — HIGH (ref 150–400)

## 2021-01-13 LAB
ALBUMIN SERPL ELPH-MCNC: 5.2 G/DL
ALP BLD-CCNC: 90 U/L
ALT SERPL-CCNC: 40 U/L
ANION GAP SERPL CALC-SCNC: 13 MMOL/L
AST SERPL-CCNC: 21 U/L
BASOPHILS # BLD AUTO: 0.01 K/UL
BASOPHILS NFR BLD AUTO: 0.2 %
BILIRUB SERPL-MCNC: 0.6 MG/DL
BUN SERPL-MCNC: 26 MG/DL
CALCIUM SERPL-MCNC: 9.8 MG/DL
CHLORIDE SERPL-SCNC: 102 MMOL/L
CO2 SERPL-SCNC: 24 MMOL/L
CREAT SERPL-MCNC: 1.62 MG/DL
EOSINOPHIL # BLD AUTO: 0.02 K/UL
EOSINOPHIL NFR BLD AUTO: 0.5 %
GLUCOSE SERPL-MCNC: 94 MG/DL
HCT VFR BLD CALC: 44.7 %
HGB BLD-MCNC: 14.9 G/DL
IMM GRANULOCYTES NFR BLD AUTO: 0.2 %
LDH SERPL-CCNC: 189 U/L
LYMPHOCYTES # BLD AUTO: 1.65 K/UL
LYMPHOCYTES NFR BLD AUTO: 37.5 %
MAN DIFF?: NORMAL
MCHC RBC-ENTMCNC: 32 PG
MCHC RBC-ENTMCNC: 33.3 GM/DL
MCV RBC AUTO: 95.9 FL
MONOCYTES # BLD AUTO: 0.78 K/UL
MONOCYTES NFR BLD AUTO: 17.7 %
NEUTROPHILS # BLD AUTO: 1.93 K/UL
NEUTROPHILS NFR BLD AUTO: 43.9 %
PLATELET # BLD AUTO: 219 K/UL
POTASSIUM SERPL-SCNC: 4.5 MMOL/L
PROT SERPL-MCNC: 7.6 G/DL
RAPID RVP RESULT: DETECTED
RBC # BLD: 4.66 M/UL
RBC # FLD: 13 %
SARS-COV-2 RNA PNL RESP NAA+PROBE: DETECTED
SODIUM SERPL-SCNC: 139 MMOL/L
WBC # FLD AUTO: 4.4 K/UL

## 2021-01-13 NOTE — ED POST DISCHARGE NOTE - DETAILS
1/13/20- [facility] fo 152.  Ruma Spoke with patient and informed him of abnormal result.  Patient reports that he had taken a dose of cyclosporine before coming to the ER so this was not a true trough level.  patient instructed to follow up with his heme/onc for further management.  He expressed understanding.  -Ambrosio Gasca PA-C

## 2021-01-14 ENCOUNTER — TRANSCRIPTION ENCOUNTER (OUTPATIENT)
Age: 36
End: 2021-01-14

## 2021-01-15 ENCOUNTER — APPOINTMENT (OUTPATIENT)
Dept: DISASTER EMERGENCY | Facility: CLINIC | Age: 36
End: 2021-01-15

## 2021-01-15 ENCOUNTER — OUTPATIENT (OUTPATIENT)
Dept: INPATIENT UNIT | Facility: HOSPITAL | Age: 36
LOS: 1 days | Discharge: HOME | End: 2021-01-15

## 2021-01-15 VITALS
OXYGEN SATURATION: 97 % | DIASTOLIC BLOOD PRESSURE: 73 MMHG | HEART RATE: 81 BPM | TEMPERATURE: 98 F | RESPIRATION RATE: 19 BRPM | SYSTOLIC BLOOD PRESSURE: 125 MMHG

## 2021-01-15 VITALS
DIASTOLIC BLOOD PRESSURE: 97 MMHG | OXYGEN SATURATION: 98 % | TEMPERATURE: 98 F | RESPIRATION RATE: 19 BRPM | HEART RATE: 95 BPM | SYSTOLIC BLOOD PRESSURE: 146 MMHG

## 2021-01-15 DIAGNOSIS — Z98.890 OTHER SPECIFIED POSTPROCEDURAL STATES: Chronic | ICD-10-CM

## 2021-01-15 RX ORDER — BAMLANIVIMAB 35 MG/ML
700 INJECTION, SOLUTION INTRAVENOUS ONCE
Refills: 0 | Status: COMPLETED | OUTPATIENT
Start: 2021-01-15 | End: 2021-01-15

## 2021-01-15 RX ADMIN — BAMLANIVIMAB 270 MILLIGRAM(S): 35 INJECTION, SOLUTION INTRAVENOUS at 11:00

## 2021-01-15 NOTE — CHART NOTE - NSCHARTNOTEFT_GEN_A_CORE
CC: Monoclonal Antibody Infusion/COVID 19 Positive  35yMale    Pt is a 36 yo male, COVID + 1/8. Symptoms began 1/7 with fever and body aches.    PMH: aplastic anemia    MEDS: cyclosporin, acyclovir, pepsid, HCTZ    ALLERGIES: NKDA    HT/WT: 5'9" 230 LBS    exam/findings:  T(C): --  HR: --  BP: --  RR: --  SpO2: --      PE:   Appearance: NAD	  HEENT:   Normal oral mucosa,   Lymphatic: No lymphadenopathy  Cardiovascular: Normal S1 S2, No JVD, No murmurs, No edema  Respiratory: Lungs clear to auscultation	  Gastrointestinal:  Soft, Non-tender, + BS	  Skin: warm and dry  Neurologic: Non-focal  Extremities: Normal range of motion,    ASSESSMENT:  Pt is a 36 yo male, Covid + 1/8 referred by Dr. Bender, who presents to infusion center for Monoclonal antibody infusion (Bamlanivimab)  Symptoms/ Criteria:  fever, body aches  Risk Profile includes: immuno suppressant disease (aplastic anemia)    PLAN:  - infusion procedure explained to patient   -Consent for monoclonal antibody infusion obtained   - Risk & benefits discussed/all questions answered   -infuse  Bamlanivimab 700mg  IV over one hour   -observe patient for one hour post infusion   - pt was discharged in stable condition.  - pt tolerated the procedure well.  - pt was instructed to continue quarantine and hand hygiene  - pt was instructed to go to the emergency department if they experience difficulty breathing, shortness of breath, fever over 100.4 and/or pulse ox under 94%    Jillian D'Amico, PA-C CC: Monoclonal Antibody Infusion/COVID 19 Positive  35yMale    Pt is a 36 yo male, COVID + 1/8. Symptoms began 1/7 with fever and body aches.    PMH: aplastic anemia    MEDS: cyclosporin, acyclovir, pepsid, HCTZ    ALLERGIES: NKDA    HT/WT: 5'9" 230 LBS    exam/findings:  Vital Signs Last 24 Hrs  T(C): 36.6 (15 Dilan 2021 11:00), Max: 36.6 (15 Dilan 2021 11:00)  T(F): 97.8 (15 Dilan 2021 11:00), Max: 97.8 (15 Dilan 2021 11:00)  HR: 95 (15 Dilan 2021 11:00) (95 - 95)  BP: 146/97 (15 Dilan 2021 11:00) (146/97 - 146/97)  BP(mean): --  RR: 19 (15 Dilan 2021 11:00) (19 - 19)  SpO2: 98% (15 Dilan 2021 11:00) (98% - 98%)    PE:   Appearance: NAD	  HEENT:   Normal oral mucosa,   Lymphatic: No lymphadenopathy  Cardiovascular: Normal S1 S2, No JVD, No murmurs, No edema  Respiratory: Lungs clear to auscultation	  Gastrointestinal:  Soft, Non-tender, + BS	  Skin: warm and dry  Neurologic: Non-focal  Extremities: Normal range of motion,    ASSESSMENT:  Pt is a 36 yo male, Covid + 1/8 referred by Dr. Bender, who presents to infusion center for Monoclonal antibody infusion (Bamlanivimab)  Symptoms/ Criteria:  fever, body aches  Risk Profile includes: immuno suppressant disease (aplastic anemia)    PLAN:  - infusion procedure explained to patient   -Consent for monoclonal antibody infusion obtained   - Risk & benefits discussed/all questions answered   -infuse  Bamlanivimab 700mg  IV over one hour   -observe patient for one hour post infusion   - pt was discharged in stable condition.  - pt tolerated the procedure well.  - pt was instructed to continue quarantine and hand hygiene  - pt was instructed to go to the emergency department if they experience difficulty breathing, shortness of breath, fever over 100.4 and/or pulse ox under 94%    Jillian D'Amico, PA-C

## 2021-01-16 ENCOUNTER — TRANSCRIPTION ENCOUNTER (OUTPATIENT)
Age: 36
End: 2021-01-16

## 2021-01-19 DIAGNOSIS — U07.1 COVID-19: ICD-10-CM

## 2021-01-20 ENCOUNTER — TRANSCRIPTION ENCOUNTER (OUTPATIENT)
Age: 36
End: 2021-01-20

## 2021-02-02 ENCOUNTER — OUTPATIENT (OUTPATIENT)
Dept: OUTPATIENT SERVICES | Facility: HOSPITAL | Age: 36
LOS: 1 days | Discharge: HOME | End: 2021-02-02

## 2021-02-02 DIAGNOSIS — D64.9 ANEMIA, UNSPECIFIED: ICD-10-CM

## 2021-02-02 DIAGNOSIS — Z98.890 OTHER SPECIFIED POSTPROCEDURAL STATES: Chronic | ICD-10-CM

## 2021-02-05 ENCOUNTER — RESULT REVIEW (OUTPATIENT)
Age: 36
End: 2021-02-05

## 2021-02-05 ENCOUNTER — APPOINTMENT (OUTPATIENT)
Dept: HEMATOLOGY ONCOLOGY | Facility: CLINIC | Age: 36
End: 2021-02-05
Payer: COMMERCIAL

## 2021-02-05 VITALS
HEIGHT: 68.9 IN | OXYGEN SATURATION: 98 % | WEIGHT: 225.75 LBS | DIASTOLIC BLOOD PRESSURE: 87 MMHG | BODY MASS INDEX: 33.44 KG/M2 | TEMPERATURE: 98.4 F | HEART RATE: 86 BPM | RESPIRATION RATE: 16 BRPM | SYSTOLIC BLOOD PRESSURE: 143 MMHG

## 2021-02-05 LAB
BASOPHILS # BLD AUTO: 0.02 K/UL — SIGNIFICANT CHANGE UP (ref 0–0.2)
BASOPHILS NFR BLD AUTO: 0.3 % — SIGNIFICANT CHANGE UP (ref 0–2)
EOSINOPHIL # BLD AUTO: 0.02 K/UL — SIGNIFICANT CHANGE UP (ref 0–0.5)
EOSINOPHIL NFR BLD AUTO: 0.3 % — SIGNIFICANT CHANGE UP (ref 0–6)
HCT VFR BLD CALC: 37.9 % — LOW (ref 39–50)
HGB BLD-MCNC: 14.1 G/DL — SIGNIFICANT CHANGE UP (ref 13–17)
IMM GRANULOCYTES NFR BLD AUTO: 0.3 % — SIGNIFICANT CHANGE UP (ref 0–1.5)
LYMPHOCYTES # BLD AUTO: 2.04 K/UL — SIGNIFICANT CHANGE UP (ref 1–3.3)
LYMPHOCYTES # BLD AUTO: 33.5 % — SIGNIFICANT CHANGE UP (ref 13–44)
MCHC RBC-ENTMCNC: 32.5 PG — SIGNIFICANT CHANGE UP (ref 27–34)
MCHC RBC-ENTMCNC: 37.2 G/DL — HIGH (ref 32–36)
MCV RBC AUTO: 87.3 FL — SIGNIFICANT CHANGE UP (ref 80–100)
MONOCYTES # BLD AUTO: 0.57 K/UL — SIGNIFICANT CHANGE UP (ref 0–0.9)
MONOCYTES NFR BLD AUTO: 9.4 % — SIGNIFICANT CHANGE UP (ref 2–14)
NEUTROPHILS # BLD AUTO: 3.42 K/UL — SIGNIFICANT CHANGE UP (ref 1.8–7.4)
NEUTROPHILS NFR BLD AUTO: 56.2 % — SIGNIFICANT CHANGE UP (ref 43–77)
NRBC # BLD: 0 /100 WBCS — SIGNIFICANT CHANGE UP (ref 0–0)
PLATELET # BLD AUTO: 229 K/UL — SIGNIFICANT CHANGE UP (ref 150–400)
RBC # BLD: 4.34 M/UL — SIGNIFICANT CHANGE UP (ref 4.2–5.8)
RBC # FLD: 11.9 % — SIGNIFICANT CHANGE UP (ref 10.3–14.5)
WBC # BLD: 6.09 K/UL — SIGNIFICANT CHANGE UP (ref 3.8–10.5)
WBC # FLD AUTO: 6.09 K/UL — SIGNIFICANT CHANGE UP (ref 3.8–10.5)

## 2021-02-05 PROCEDURE — 99214 OFFICE O/P EST MOD 30 MIN: CPT

## 2021-02-05 PROCEDURE — 99072 ADDL SUPL MATRL&STAF TM PHE: CPT

## 2021-02-05 NOTE — PHYSICAL EXAM
[Fully active, able to carry on all pre-disease performance without restriction] : Status 0 - Fully active, able to carry on all pre-disease performance without restriction [Normal] : affect appropriate [de-identified] : JEANETH [de-identified] : supple [de-identified] : (+) S1S2 RRR [de-identified] : no edema (+)pp [de-identified] : no tenderness [de-identified] : ROM intact [de-identified] : warm/dry [de-identified] : A&Ox3

## 2021-02-05 NOTE — ASSESSMENT
[FreeTextEntry1] : 34 y/o M presented April 2020 with aplastic anemia (very severe AA) confirmed on bone marrow biopsy at [facility] and now s/p inpatient treatment beginning 4/23/2020 with horse ATG + CsA + promacta with complete remission. Bone marrow bx on 11/9/20 revealed hypocellular marrow, overall cellularity improved (20-50%) showing marrow regeneration. \par \par -Hematologic parameters back to normal, excellent response to IST. No longer requiring any transfusional support. \par -Currently on CsA 250 mg BID, trough levels have been therapeutic with the exception of one level that was elevated due to patient taking dose prior to visit. Patient held dose today as previously instructed for accurate level. Continue current management with CsA for 12 months, at which time will begin taper (end of April 2021).\par -Promacta was planned for 6 months of therapy, this had been discontinued earlier this month. Follow up CBC showed stable counts. \par -Continue Acyclovir while on CSA\par -Patient with mild elevation in Cr on previous measurement due to COVID, will recheck today with cyclosporine level. \par -RTC in 1 month. \par \par

## 2021-02-05 NOTE — HISTORY OF PRESENT ILLNESS
[Disease:__________________________] : Disease: [unfilled] [Therapy: ___] : Therapy: [unfilled] [de-identified] : 33 y/o M with no PMH presented to OSH originally for c/o dizziness and feeling unwell and was then transferred to [facility] for further hematologic evaluation as patient was found to be severely pancytopenic. Patient first started noticing scattered bruising throughout his body (mostly thighs and arms) about 6 weeks prior to presentation, and also had epistaxis. Pt had also noticed intermittent fevers and chills, responsive to tylenol, and noticed very dark stools for the same time period. Also with spontaneous gum bleeding and endorsed severe fatigue and SOB with exertion. Also noted 15 lbs weight loss, partially intentional over past 2-3 months prior to presentation.  Also c/o mild headache located in posterior aspect of his head, denies sore throat, cough, +nausea but no vomiting, no abdominal pain, no diarrhea, no BRBPR, no urinary symptoms, no LE swelling, +visual changes.     \par \par Patient was transferred to [facility] from [facility] on 4/18. Patient was started on IVF, Levaquin, and allopurinol. Patient transfused blood and platelets. Bone Marrow biopsy performed on 4/20 consistent with Aplastic Anemia. 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 Because of this, patient's platelet transfusion threshold was made 50k. Horse ATG test dose was applied 4/22 with no reaction. Treatment started 4/23 consisting of equine ATG at 40mg/kg/day x 4 days, Cyclosporine 10mg/kg/day divided doses BID, Eltrombopag 150mg daily. Patient had episode of hives with ATG infusion on Day 1 thus steroid regimen was changed from oral prednisone to IV Solumedrol Q8 for the duration of ATG treatment and transitioned back to oral prednisone 1mg/kg daily for days 5-10 then tapered. The patient had grade 3 transaminitis likely from Promacta and ATG and Promacta was held as of 5/1. While patient was in the hospital, his course was also complicated by epigastric pain consistent with dyspepsia, most likely due to cyclosporine. Was managed on pepcid BID and sucralfate q6 hours and mylanta as needed with good relief. \par \par On 5/12 the patient's transaminitis had improved(grade 2 total bili, grade 1 AST, grade 2 ALT) and Promacta 150 mg po QD was resumed. Pt's cyclosporine dose was adjusted  according to the level (goal 200-400) and he was on 400 mg po q12h as of 5/18. On 5/16, patient developed chest discomfort during platelet transfusion which improved with benadryl. EKG and CXR were wnl. Patient originally refused benadryl pre-medication prior to platelets that day due to side effects with taking Benadryl, and it was decided to premedicate pt with Claritin or Zyrtec pre blood transfusion instead. On 5/20, cyclosporine level was 460, therefore dose was lowered to 300 mg PO BID. Patient's vision had improved, so platelet transfusion threshold liberalized to 20k. \par \par Patient has since had a complete recovery of blood counts. \par  [de-identified] : - Markedly hypocellular bone marrow (mostly 5% and focal 10-\par 15%) with focal minimal erythropoiesis, minimal to absent\par myelopoiesis, absent megakaryocytes, increased iron stores. Aplastic bone marrow.  [de-identified] : Cytogenetics: \par \par Result: Male karyotype\par Karyotype: 46,XY          {12          } [de-identified] : very severe Aplastic Anemia [FreeTextEntry1] : Therapy initiated 4/23/2020 [de-identified] : Patient without complaints today doing well.

## 2021-02-10 LAB
ALBUMIN SERPL ELPH-MCNC: 5 G/DL
ALP BLD-CCNC: 85 U/L
ALT SERPL-CCNC: 31 U/L
ANION GAP SERPL CALC-SCNC: 14 MMOL/L
AST SERPL-CCNC: 17 U/L
BILIRUB SERPL-MCNC: 0.7 MG/DL
BUN SERPL-MCNC: 20 MG/DL
CALCIUM SERPL-MCNC: 9.6 MG/DL
CHLORIDE SERPL-SCNC: 101 MMOL/L
CO2 SERPL-SCNC: 23 MMOL/L
CREAT SERPL-MCNC: 1.46 MG/DL
CYCLOSPORINE SER-MCNC: 37 NG/ML
GLUCOSE SERPL-MCNC: 96 MG/DL
LDH SERPL-CCNC: 193 U/L
POTASSIUM SERPL-SCNC: 4.1 MMOL/L
PROT SERPL-MCNC: 7.1 G/DL
SODIUM SERPL-SCNC: 138 MMOL/L
URATE SERPL-MCNC: 9.2 MG/DL

## 2021-02-12 ENCOUNTER — RESULT REVIEW (OUTPATIENT)
Age: 36
End: 2021-02-12

## 2021-02-12 ENCOUNTER — APPOINTMENT (OUTPATIENT)
Dept: HEMATOLOGY ONCOLOGY | Facility: CLINIC | Age: 36
End: 2021-02-12

## 2021-02-12 LAB
BASOPHILS # BLD AUTO: 0.01 K/UL — SIGNIFICANT CHANGE UP (ref 0–0.2)
BASOPHILS NFR BLD AUTO: 0.2 % — SIGNIFICANT CHANGE UP (ref 0–2)
EOSINOPHIL # BLD AUTO: 0.02 K/UL — SIGNIFICANT CHANGE UP (ref 0–0.5)
EOSINOPHIL NFR BLD AUTO: 0.4 % — SIGNIFICANT CHANGE UP (ref 0–6)
HCT VFR BLD CALC: 39 % — SIGNIFICANT CHANGE UP (ref 39–50)
HGB BLD-MCNC: 13.8 G/DL — SIGNIFICANT CHANGE UP (ref 13–17)
IMM GRANULOCYTES NFR BLD AUTO: 0.4 % — SIGNIFICANT CHANGE UP (ref 0–1.5)
LYMPHOCYTES # BLD AUTO: 1.7 K/UL — SIGNIFICANT CHANGE UP (ref 1–3.3)
LYMPHOCYTES # BLD AUTO: 35.6 % — SIGNIFICANT CHANGE UP (ref 13–44)
MCHC RBC-ENTMCNC: 31.8 PG — SIGNIFICANT CHANGE UP (ref 27–34)
MCHC RBC-ENTMCNC: 35.4 G/DL — SIGNIFICANT CHANGE UP (ref 32–36)
MCV RBC AUTO: 89.9 FL — SIGNIFICANT CHANGE UP (ref 80–100)
MONOCYTES # BLD AUTO: 0.39 K/UL — SIGNIFICANT CHANGE UP (ref 0–0.9)
MONOCYTES NFR BLD AUTO: 8.2 % — SIGNIFICANT CHANGE UP (ref 2–14)
NEUTROPHILS # BLD AUTO: 2.63 K/UL — SIGNIFICANT CHANGE UP (ref 1.8–7.4)
NEUTROPHILS NFR BLD AUTO: 55.2 % — SIGNIFICANT CHANGE UP (ref 43–77)
NRBC # BLD: 0 /100 WBCS — SIGNIFICANT CHANGE UP (ref 0–0)
PLATELET # BLD AUTO: 226 K/UL — SIGNIFICANT CHANGE UP (ref 150–400)
RBC # BLD: 4.34 M/UL — SIGNIFICANT CHANGE UP (ref 4.2–5.8)
RBC # FLD: 12.2 % — SIGNIFICANT CHANGE UP (ref 10.3–14.5)
WBC # BLD: 4.77 K/UL — SIGNIFICANT CHANGE UP (ref 3.8–10.5)
WBC # FLD AUTO: 4.77 K/UL — SIGNIFICANT CHANGE UP (ref 3.8–10.5)

## 2021-02-17 LAB — CYCLOSPORINE SER-MCNC: 258 NG/ML

## 2021-03-02 ENCOUNTER — OUTPATIENT (OUTPATIENT)
Dept: OUTPATIENT SERVICES | Facility: HOSPITAL | Age: 36
LOS: 1 days | Discharge: HOME | End: 2021-03-02

## 2021-03-02 DIAGNOSIS — D64.9 ANEMIA, UNSPECIFIED: ICD-10-CM

## 2021-03-02 DIAGNOSIS — Z98.890 OTHER SPECIFIED POSTPROCEDURAL STATES: Chronic | ICD-10-CM

## 2021-03-05 ENCOUNTER — APPOINTMENT (OUTPATIENT)
Dept: HEMATOLOGY ONCOLOGY | Facility: CLINIC | Age: 36
End: 2021-03-05
Payer: COMMERCIAL

## 2021-03-05 DIAGNOSIS — Z20.822 CONTACT WITH AND (SUSPECTED) EXPOSURE TO COVID-19: ICD-10-CM

## 2021-03-05 PROCEDURE — 99214 OFFICE O/P EST MOD 30 MIN: CPT | Mod: 95

## 2021-03-07 PROBLEM — Z20.822 EXPOSURE TO 2019 NOVEL CORONAVIRUS: Status: ACTIVE | Noted: 2020-10-05

## 2021-03-07 NOTE — REVIEW OF SYSTEMS
[Eye Pain] : no eye pain [Vision Problems] : no vision problems [Dysphagia] : no dysphagia [Nosebleeds] : no nosebleeds [Odynophagia] : no odynophagia [Chest Pain] : no chest pain [Palpitations] : no palpitations [Lower Ext Edema] : no lower extremity edema [Joint Pain] : no joint pain [Joint Stiffness] : no joint stiffness [Muscle Pain] : no muscle pain [Dizziness] : no dizziness [Fainting] : no fainting [Insomnia] : no insomnia [Anxiety] : no anxiety [Hot Flashes] : no hot flashes [Negative] : Heme/Lymph

## 2021-03-07 NOTE — PHYSICAL EXAM
[Fully active, able to carry on all pre-disease performance without restriction] : Status 0 - Fully active, able to carry on all pre-disease performance without restriction [Normal] : affect appropriate [de-identified] : JEANETH [de-identified] : supple [de-identified] : (+) S1S2 RRR [de-identified] : no edema (+)pp [de-identified] : no tenderness [de-identified] : ROM intact [de-identified] : warm/dry [de-identified] : A&Ox3

## 2021-03-07 NOTE — ASSESSMENT
[FreeTextEntry1] : 36 y/o M presented April 2020 with aplastic anemia (very severe AA) confirmed on bone marrow biopsy at [facility] and now s/p inpatient treatment beginning 4/23/2020 with horse ATG + CsA + promacta with complete remission. Bone marrow bx on 11/9/20 revealed hypocellular marrow, overall cellularity improved (20-50%) showing marrow regeneration. \par \par -Hematologic parameters back to normal, excellent response to IST. No longer requiring any transfusional support. \par -Currently on CsA 250 mg BID, trough levels have been therapeutic.. Continue current management with CsA for 12 months, at which time will begin taper (end of April 2021).\par -Promacta was planned for 6 months of therapy, this had been discontinued earlier this month. Follow up CBC showed stable counts. \par -Continue Acyclovir while on CSA\par -Patient with mild elevation in Cr on previous measurement due to COVID, this was trending towards normal. Will follow this to resolution. \par -RTC in 1 month. Will get bone marrow biopsy at end of April. \par \par

## 2021-03-07 NOTE — HISTORY OF PRESENT ILLNESS
[Home] : at home, [unfilled] , at the time of the visit. [Medical Office: ([facility])___] : at the medical office located in  [Verbal consent obtained from patient] : the patient, [unfilled] [Disease:__________________________] : Disease: [unfilled] [de-identified] : 35 y/o M with no PMH presented to OSH originally for c/o dizziness and feeling unwell and was then transferred to [facility] for further hematologic evaluation as patient was found to be severely pancytopenic. Patient first started noticing scattered bruising throughout his body (mostly thighs and arms) about 6 weeks prior to presentation, and also had epistaxis. Pt had also noticed intermittent fevers and chills, responsive to tylenol, and noticed very dark stools for the same time period. Also with spontaneous gum bleeding and endorsed severe fatigue and SOB with exertion. Also noted 15 lbs weight loss, partially intentional over past 2-3 months prior to presentation.  Also c/o mild headache located in posterior aspect of his head, denies sore throat, cough, +nausea but no vomiting, no abdominal pain, no diarrhea, no BRBPR, no urinary symptoms, no LE swelling, +visual changes.     \par \par Patient was transferred to [facility] from [facility] on 4/18. Patient was started on IVF, Levaquin, and allopurinol. Patient transfused blood and platelets. Bone Marrow biopsy performed on 4/20 consistent with Aplastic Anemia. 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 Because of this, patient's platelet transfusion threshold was made 50k. Horse ATG test dose was applied 4/22 with no reaction. Treatment started 4/23 consisting of equine ATG at 40mg/kg/day x 4 days, Cyclosporine 10mg/kg/day divided doses BID, Eltrombopag 150mg daily. Patient had episode of hives with ATG infusion on Day 1 thus steroid regimen was changed from oral prednisone to IV Solumedrol Q8 for the duration of ATG treatment and transitioned back to oral prednisone 1mg/kg daily for days 5-10 then tapered. The patient had grade 3 transaminitis likely from Promacta and ATG and Promacta was held as of 5/1. While patient was in the hospital, his course was also complicated by epigastric pain consistent with dyspepsia, most likely due to cyclosporine. Was managed on pepcid BID and sucralfate q6 hours and mylanta as needed with good relief. \par \par On 5/12 the patient's transaminitis had improved(grade 2 total bili, grade 1 AST, grade 2 ALT) and Promacta 150 mg po QD was resumed. Pt's cyclosporine dose was adjusted  according to the level (goal 200-400) and he was on 400 mg po q12h as of 5/18. On 5/16, patient developed chest discomfort during platelet transfusion which improved with benadryl. EKG and CXR were wnl. Patient originally refused benadryl pre-medication prior to platelets that day due to side effects with taking Benadryl, and it was decided to premedicate pt with Claritin or Zyrtec pre blood transfusion instead. On 5/20, cyclosporine level was 460, therefore dose was lowered to 300 mg PO BID. Patient's vision had improved, so platelet transfusion threshold liberalized to 20k. \par \par Patient has since had a complete recovery of blood counts. He discontinued Promacta after 6 months of therapy. He has been monitored since then wit stable CBC and therapeutic cyclosporine levels. \par  [de-identified] : - Markedly hypocellular bone marrow (mostly 5% and focal 10-\par 15%) with focal minimal erythropoiesis, minimal to absent\par myelopoiesis, absent megakaryocytes, increased iron stores. Aplastic bone marrow.  [de-identified] : Cytogenetics: \par \par Result: Male karyotype\par Karyotype: 46,XY           {12           } [de-identified] : very severe Aplastic Anemia [Therapy: ___] : Therapy: [unfilled] [FreeTextEntry1] : Therapy initiated 4/23/2020 [de-identified] : Patient without complaints today doing well.

## 2021-03-11 ENCOUNTER — APPOINTMENT (OUTPATIENT)
Dept: HEMATOLOGY ONCOLOGY | Facility: CLINIC | Age: 36
End: 2021-03-11

## 2021-03-11 ENCOUNTER — RESULT REVIEW (OUTPATIENT)
Age: 36
End: 2021-03-11

## 2021-03-11 LAB
BASOPHILS # BLD AUTO: 0.02 K/UL — SIGNIFICANT CHANGE UP (ref 0–0.2)
BASOPHILS NFR BLD AUTO: 0.4 % — SIGNIFICANT CHANGE UP (ref 0–2)
EOSINOPHIL # BLD AUTO: 0.03 K/UL — SIGNIFICANT CHANGE UP (ref 0–0.5)
EOSINOPHIL NFR BLD AUTO: 0.6 % — SIGNIFICANT CHANGE UP (ref 0–6)
HCT VFR BLD CALC: 39.2 % — SIGNIFICANT CHANGE UP (ref 39–50)
HGB BLD-MCNC: 13.8 G/DL — SIGNIFICANT CHANGE UP (ref 13–17)
IMM GRANULOCYTES NFR BLD AUTO: 0.4 % — SIGNIFICANT CHANGE UP (ref 0–1.5)
LYMPHOCYTES # BLD AUTO: 1.63 K/UL — SIGNIFICANT CHANGE UP (ref 1–3.3)
LYMPHOCYTES # BLD AUTO: 35.3 % — SIGNIFICANT CHANGE UP (ref 13–44)
MCHC RBC-ENTMCNC: 31.4 PG — SIGNIFICANT CHANGE UP (ref 27–34)
MCHC RBC-ENTMCNC: 35.2 G/DL — SIGNIFICANT CHANGE UP (ref 32–36)
MCV RBC AUTO: 89.1 FL — SIGNIFICANT CHANGE UP (ref 80–100)
MONOCYTES # BLD AUTO: 0.37 K/UL — SIGNIFICANT CHANGE UP (ref 0–0.9)
MONOCYTES NFR BLD AUTO: 8 % — SIGNIFICANT CHANGE UP (ref 2–14)
NEUTROPHILS # BLD AUTO: 2.55 K/UL — SIGNIFICANT CHANGE UP (ref 1.8–7.4)
NEUTROPHILS NFR BLD AUTO: 55.3 % — SIGNIFICANT CHANGE UP (ref 43–77)
NRBC # BLD: 0 /100 WBCS — SIGNIFICANT CHANGE UP (ref 0–0)
PLATELET # BLD AUTO: 228 K/UL — SIGNIFICANT CHANGE UP (ref 150–400)
RBC # BLD: 4.4 M/UL — SIGNIFICANT CHANGE UP (ref 4.2–5.8)
RBC # FLD: 12.3 % — SIGNIFICANT CHANGE UP (ref 10.3–14.5)
WBC # BLD: 4.62 K/UL — SIGNIFICANT CHANGE UP (ref 3.8–10.5)
WBC # FLD AUTO: 4.62 K/UL — SIGNIFICANT CHANGE UP (ref 3.8–10.5)

## 2021-04-07 LAB
ALBUMIN SERPL ELPH-MCNC: 4.8 G/DL
ALP BLD-CCNC: 82 U/L
ALT SERPL-CCNC: 32 U/L
ANION GAP SERPL CALC-SCNC: 10 MMOL/L
AST SERPL-CCNC: 20 U/L
BILIRUB SERPL-MCNC: 0.8 MG/DL
BUN SERPL-MCNC: 25 MG/DL
CALCIUM SERPL-MCNC: 9.6 MG/DL
CHLORIDE SERPL-SCNC: 102 MMOL/L
CO2 SERPL-SCNC: 26 MMOL/L
CREAT SERPL-MCNC: 1.26 MG/DL
CYCLOSPORINE SER-MCNC: 274 NG/ML
GLUCOSE SERPL-MCNC: 103 MG/DL
MAGNESIUM SERPL-MCNC: 2 MG/DL
PHOSPHATE SERPL-MCNC: 3.1 MG/DL
POTASSIUM SERPL-SCNC: 4.9 MMOL/L
PROT SERPL-MCNC: 7.4 G/DL
SODIUM SERPL-SCNC: 138 MMOL/L

## 2021-05-14 ENCOUNTER — OUTPATIENT (OUTPATIENT)
Dept: OUTPATIENT SERVICES | Facility: HOSPITAL | Age: 36
LOS: 1 days | Discharge: HOME | End: 2021-05-14

## 2021-05-14 DIAGNOSIS — Z98.890 OTHER SPECIFIED POSTPROCEDURAL STATES: Chronic | ICD-10-CM

## 2021-05-14 DIAGNOSIS — D64.9 ANEMIA, UNSPECIFIED: ICD-10-CM

## 2021-05-18 ENCOUNTER — RESULT REVIEW (OUTPATIENT)
Age: 36
End: 2021-05-18

## 2021-05-18 ENCOUNTER — LABORATORY RESULT (OUTPATIENT)
Age: 36
End: 2021-05-18

## 2021-05-18 ENCOUNTER — APPOINTMENT (OUTPATIENT)
Dept: HEMATOLOGY ONCOLOGY | Facility: CLINIC | Age: 36
End: 2021-05-18
Payer: COMMERCIAL

## 2021-05-18 VITALS
BODY MASS INDEX: 33.43 KG/M2 | HEART RATE: 75 BPM | WEIGHT: 225.75 LBS | SYSTOLIC BLOOD PRESSURE: 148 MMHG | RESPIRATION RATE: 14 BRPM | OXYGEN SATURATION: 99 % | DIASTOLIC BLOOD PRESSURE: 87 MMHG | TEMPERATURE: 98 F

## 2021-05-18 LAB
BASOPHILS # BLD AUTO: 0.02 K/UL — SIGNIFICANT CHANGE UP (ref 0–0.2)
BASOPHILS NFR BLD AUTO: 0.3 % — SIGNIFICANT CHANGE UP (ref 0–2)
EOSINOPHIL # BLD AUTO: 0.09 K/UL — SIGNIFICANT CHANGE UP (ref 0–0.5)
EOSINOPHIL NFR BLD AUTO: 1.5 % — SIGNIFICANT CHANGE UP (ref 0–6)
HCT VFR BLD CALC: 38.5 % — LOW (ref 39–50)
HGB BLD-MCNC: 14 G/DL — SIGNIFICANT CHANGE UP (ref 13–17)
IMM GRANULOCYTES NFR BLD AUTO: 0.5 % — SIGNIFICANT CHANGE UP (ref 0–1.5)
LYMPHOCYTES # BLD AUTO: 2.03 K/UL — SIGNIFICANT CHANGE UP (ref 1–3.3)
LYMPHOCYTES # BLD AUTO: 34 % — SIGNIFICANT CHANGE UP (ref 13–44)
MCHC RBC-ENTMCNC: 32.4 PG — SIGNIFICANT CHANGE UP (ref 27–34)
MCHC RBC-ENTMCNC: 36.4 G/DL — HIGH (ref 32–36)
MCV RBC AUTO: 89.1 FL — SIGNIFICANT CHANGE UP (ref 80–100)
MONOCYTES # BLD AUTO: 0.54 K/UL — SIGNIFICANT CHANGE UP (ref 0–0.9)
MONOCYTES NFR BLD AUTO: 9 % — SIGNIFICANT CHANGE UP (ref 2–14)
NEUTROPHILS # BLD AUTO: 3.26 K/UL — SIGNIFICANT CHANGE UP (ref 1.8–7.4)
NEUTROPHILS NFR BLD AUTO: 54.7 % — SIGNIFICANT CHANGE UP (ref 43–77)
NRBC # BLD: 0 /100 WBCS — SIGNIFICANT CHANGE UP (ref 0–0)
PLATELET # BLD AUTO: 221 K/UL — SIGNIFICANT CHANGE UP (ref 150–400)
RBC # BLD: 4.32 M/UL — SIGNIFICANT CHANGE UP (ref 4.2–5.8)
RBC # FLD: 12.8 % — SIGNIFICANT CHANGE UP (ref 10.3–14.5)
WBC # BLD: 5.97 K/UL — SIGNIFICANT CHANGE UP (ref 3.8–10.5)
WBC # FLD AUTO: 5.97 K/UL — SIGNIFICANT CHANGE UP (ref 3.8–10.5)

## 2021-05-18 PROCEDURE — 38222 DX BONE MARROW BX & ASPIR: CPT | Mod: LT

## 2021-05-18 RX ORDER — ONDANSETRON 8 MG/1
8 TABLET ORAL
Refills: 0 | Status: DISCONTINUED | COMMUNITY
End: 2021-05-18

## 2021-05-18 RX ORDER — MAG HYDROX/ALUMINUM HYD/SIMETH 200-200-20
SUSPENSION, ORAL (FINAL DOSE FORM) ORAL
Refills: 0 | Status: DISCONTINUED | COMMUNITY
End: 2021-05-18

## 2021-05-18 NOTE — REASON FOR VISIT
[Bone Marrow Biopsy] : bone marrow biopsy [Bone Marrow Aspiration] : bone marrow aspiration [FreeTextEntry2] : 36 yo male dxed w/ aplastic anemia s/p tx w/ horse ATG+CSA+promacta w/CR, BMBX to assess remission

## 2021-05-18 NOTE — PROCEDURE
[Bone Marrow Biopsy] : bone marrow biopsy [Bone Marrow Aspiration] : bone marrow aspiration  [Patient] : the patient [Verbal Consent Obtained] : verbal consent was obtained prior to the procedure [Patient identification verified] : patient identification verified [Procedure verified and consent obtained] : procedure verified and consent obtained [Laterality verified and correct site marked] : laterality verified and correct site marked [Correct positioning] : correct positioning [Prone] : prone [Superior iliac spine was identified] : the superior iliac spine was identified. [Lidocaine was injected and into the periosteum overlying the site.] : Lidocaine was injected and into the periosteum overlying the site. [Aspirate] : aspirate [Cytogenetics] : cytogenetics [FISH] : FISH [Biopsy] : biopsy [Flow Cytometry] : flow cytometry [] : The patient was instructed to remove the bandage the following AM. The patient may bathe. Acetaminophen may be taken for discomfort, as per package directions.If there are any other problems, the patient was instructed to call the office. The patient verbalized understanding, and is aware of the office contact numbers. [Left] : site: left [The left posterior iliac crest was prepped with betadine and draped, using sterile technique.] : The left posterior iliac crest was prepped with betadine and draped, using sterile technique. [FreeTextEntry1] : 34 yo male dxed w/ aplastic anemia s/p tx w/ horse ATG+CSA+promacta w/CR, BMBX to assess remission  [FreeTextEntry2] : WBC: 5.97\par Hgb: 14\par PLT: 221K\par \par Bone marrow biopsy and aspiration completed. Patient completed without difficulty.

## 2021-05-19 ENCOUNTER — LABORATORY RESULT (OUTPATIENT)
Age: 36
End: 2021-05-19

## 2021-05-25 ENCOUNTER — RESULT REVIEW (OUTPATIENT)
Age: 36
End: 2021-05-25

## 2021-05-25 ENCOUNTER — APPOINTMENT (OUTPATIENT)
Dept: HEMATOLOGY ONCOLOGY | Facility: CLINIC | Age: 36
End: 2021-05-25
Payer: COMMERCIAL

## 2021-05-25 ENCOUNTER — NON-APPOINTMENT (OUTPATIENT)
Age: 36
End: 2021-05-25

## 2021-05-25 ENCOUNTER — LABORATORY RESULT (OUTPATIENT)
Age: 36
End: 2021-05-25

## 2021-05-25 VITALS
BODY MASS INDEX: 33.96 KG/M2 | DIASTOLIC BLOOD PRESSURE: 89 MMHG | RESPIRATION RATE: 14 BRPM | SYSTOLIC BLOOD PRESSURE: 145 MMHG | HEIGHT: 68.9 IN | WEIGHT: 229.28 LBS | HEART RATE: 62 BPM | TEMPERATURE: 97.3 F | OXYGEN SATURATION: 99 %

## 2021-05-25 LAB
BASOPHILS # BLD AUTO: 0.02 K/UL — SIGNIFICANT CHANGE UP (ref 0–0.2)
BASOPHILS NFR BLD AUTO: 0.4 % — SIGNIFICANT CHANGE UP (ref 0–2)
EOSINOPHIL # BLD AUTO: 0.06 K/UL — SIGNIFICANT CHANGE UP (ref 0–0.5)
EOSINOPHIL NFR BLD AUTO: 1.1 % — SIGNIFICANT CHANGE UP (ref 0–6)
HCT VFR BLD CALC: 41.2 % — SIGNIFICANT CHANGE UP (ref 39–50)
HGB BLD-MCNC: 14.2 G/DL — SIGNIFICANT CHANGE UP (ref 13–17)
IMM GRANULOCYTES NFR BLD AUTO: 0.6 % — SIGNIFICANT CHANGE UP (ref 0–1.5)
LYMPHOCYTES # BLD AUTO: 2.11 K/UL — SIGNIFICANT CHANGE UP (ref 1–3.3)
LYMPHOCYTES # BLD AUTO: 39.1 % — SIGNIFICANT CHANGE UP (ref 13–44)
MCHC RBC-ENTMCNC: 31 PG — SIGNIFICANT CHANGE UP (ref 27–34)
MCHC RBC-ENTMCNC: 34.5 G/DL — SIGNIFICANT CHANGE UP (ref 32–36)
MCV RBC AUTO: 90 FL — SIGNIFICANT CHANGE UP (ref 80–100)
MONOCYTES # BLD AUTO: 0.45 K/UL — SIGNIFICANT CHANGE UP (ref 0–0.9)
MONOCYTES NFR BLD AUTO: 8.3 % — SIGNIFICANT CHANGE UP (ref 2–14)
NEUTROPHILS # BLD AUTO: 2.72 K/UL — SIGNIFICANT CHANGE UP (ref 1.8–7.4)
NEUTROPHILS NFR BLD AUTO: 50.5 % — SIGNIFICANT CHANGE UP (ref 43–77)
NRBC # BLD: 0 /100 WBCS — SIGNIFICANT CHANGE UP (ref 0–0)
PLATELET # BLD AUTO: 227 K/UL — SIGNIFICANT CHANGE UP (ref 150–400)
RBC # BLD: 4.58 M/UL — SIGNIFICANT CHANGE UP (ref 4.2–5.8)
RBC # FLD: 12.8 % — SIGNIFICANT CHANGE UP (ref 10.3–14.5)
WBC # BLD: 5.39 K/UL — SIGNIFICANT CHANGE UP (ref 3.8–10.5)
WBC # FLD AUTO: 5.39 K/UL — SIGNIFICANT CHANGE UP (ref 3.8–10.5)

## 2021-05-25 PROCEDURE — 99214 OFFICE O/P EST MOD 30 MIN: CPT

## 2021-05-25 NOTE — ASSESSMENT
[FreeTextEntry1] : 36 y/o M presented April 2020 with aplastic anemia (very severe AA) confirmed on bone marrow biopsy at [facility] and now s/p inpatient treatment beginning 4/23/2020 with horse ATG + CsA + promacta with complete remission. Bone marrow bx on 11/9/20 revealed hypocellular marrow, overall cellularity improved (20-50%) showing marrow regeneration. BMBx on 5/18/2021 showing normal morphology with normal cellularity. \par \par -Hematologic parameters back to normal, excellent response to IST. No longer requiring any transfusional support. \par -Currently on CsA 250 mg BID, trough levels have been therapeutic.\par -Patient is over a year out from diagnosis now. With normal bone marrow biopsy, will now begin cyclosporine taper. Will taper slowly, plan will be to decrease by 50 mg total dose every 2 weeks and check CBC every 2 weeks. With this schedule will plan to be off cyclosporine by end of September 2021. Discussed this with patient and elda out taper schedule. He agrees with this plan. \par -Promacta was planned for 6 months of therapy, and was successfully discontinued.Platelets have been stable.\par -Continue Acyclovir while on CSA\par -Checking COVID antibodies, may wait until after taper for vaccine as may be more effective. \par -RTC in 2 months. \par

## 2021-05-25 NOTE — HISTORY OF PRESENT ILLNESS
[Disease:__________________________] : Disease: [unfilled] [de-identified] : 33 y/o M with no PMH presented to OSH originally for c/o dizziness and feeling unwell and was then transferred to [facility] for further hematologic evaluation as patient was found to be severely pancytopenic. Patient first started noticing scattered bruising throughout his body (mostly thighs and arms) about 6 weeks prior to presentation, and also had epistaxis. Pt had also noticed intermittent fevers and chills, responsive to tylenol, and noticed very dark stools for the same time period. Also with spontaneous gum bleeding and endorsed severe fatigue and SOB with exertion. Also noted 15 lbs weight loss, partially intentional over past 2-3 months prior to presentation.  Also c/o mild headache located in posterior aspect of his head, denies sore throat, cough, +nausea but no vomiting, no abdominal pain, no diarrhea, no BRBPR, no urinary symptoms, no LE swelling, +visual changes.     \par \par Patient was transferred to [facility] from [facility] on 4/18. Patient was started on IVF, Levaquin, and allopurinol. Patient transfused blood and platelets. Bone Marrow biopsy performed on 4/20 consistent with Aplastic Anemia. 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 Because of this, patient's platelet transfusion threshold was made 50k. Horse ATG test dose was applied 4/22 with no reaction. Treatment started 4/23 consisting of equine ATG at 40mg/kg/day x 4 days, Cyclosporine 10mg/kg/day divided doses BID, Eltrombopag 150mg daily. Patient had episode of hives with ATG infusion on Day 1 thus steroid regimen was changed from oral prednisone to IV Solumedrol Q8 for the duration of ATG treatment and transitioned back to oral prednisone 1mg/kg daily for days 5-10 then tapered. The patient had grade 3 transaminitis likely from Promacta and ATG and Promacta was held as of 5/1. While patient was in the hospital, his course was also complicated by epigastric pain consistent with dyspepsia, most likely due to cyclosporine. Was managed on pepcid BID and sucralfate q6 hours and mylanta as needed with good relief. \par \par On 5/12 the patient's transaminitis had improved(grade 2 total bili, grade 1 AST, grade 2 ALT) and Promacta 150 mg po QD was resumed. Pt's cyclosporine dose was adjusted  according to the level (goal 200-400) and he was on 400 mg po q12h as of 5/18. On 5/16, patient developed chest discomfort during platelet transfusion which improved with benadryl. EKG and CXR were wnl. Patient originally refused benadryl pre-medication prior to platelets that day due to side effects with taking Benadryl, and it was decided to premedicate pt with Claritin or Zyrtec pre blood transfusion instead. On 5/20, cyclosporine level was 460, therefore dose was lowered to 300 mg PO BID. Patient's vision had improved, so platelet transfusion threshold liberalized to 20k. \par \par Patient has since had a complete recovery of blood counts. He discontinued Promacta after 6 months of therapy. He has been monitored since then wit stable CBC and therapeutic cyclosporine levels. \par \par He has since had repeat bone marrow biopsy on 5/18/2021 with normal morphology and cellularity\par  [de-identified] : - Markedly hypocellular bone marrow (mostly 5% and focal 10-\par 15%) with focal minimal erythropoiesis, minimal to absent\par myelopoiesis, absent megakaryocytes, increased iron stores. Aplastic bone marrow.  [de-identified] : Cytogenetics: \par \par Result: Male karyotype\par Karyotype: 46,XY            {12            } [de-identified] : very severe Aplastic Anemia [Therapy: ___] : Therapy: [unfilled] [FreeTextEntry1] : Therapy initiated 4/23/2020 [de-identified] : Patient without complaints today doing well. He reports that his stomach feels completely normal and he is off sucralfate at this point. His bone marrow results were reviewed with him in the office today.

## 2021-05-25 NOTE — PHYSICAL EXAM
[Fully active, able to carry on all pre-disease performance without restriction] : Status 0 - Fully active, able to carry on all pre-disease performance without restriction [Normal] : affect appropriate [de-identified] : JEANETH [de-identified] : supple [de-identified] : (+) S1S2 RRR [de-identified] : no edema (+)pp [de-identified] : no tenderness [de-identified] : ROM intact [de-identified] : warm/dry [de-identified] : A&Ox3

## 2021-05-26 LAB
ALBUMIN SERPL ELPH-MCNC: 4.8 G/DL
ALP BLD-CCNC: 75 U/L
ALT SERPL-CCNC: 26 U/L
ANION GAP SERPL CALC-SCNC: 14 MMOL/L
AST SERPL-CCNC: 18 U/L
BILIRUB SERPL-MCNC: 0.8 MG/DL
BUN SERPL-MCNC: 21 MG/DL
CALCIUM SERPL-MCNC: 9.8 MG/DL
CHLORIDE SERPL-SCNC: 100 MMOL/L
CO2 SERPL-SCNC: 24 MMOL/L
CREAT SERPL-MCNC: 1.17 MG/DL
CYCLOSPORINE SER-MCNC: 662 NG/ML
GLUCOSE SERPL-MCNC: 106 MG/DL
LDH SERPL-CCNC: 194 U/L
POTASSIUM SERPL-SCNC: 4.4 MMOL/L
PROT SERPL-MCNC: 7.5 G/DL
SODIUM SERPL-SCNC: 138 MMOL/L
URATE SERPL-MCNC: 8.5 MG/DL

## 2021-06-08 ENCOUNTER — RESULT REVIEW (OUTPATIENT)
Age: 36
End: 2021-06-08

## 2021-06-08 ENCOUNTER — APPOINTMENT (OUTPATIENT)
Dept: HEMATOLOGY ONCOLOGY | Facility: CLINIC | Age: 36
End: 2021-06-08

## 2021-06-08 LAB
BASOPHILS # BLD AUTO: 0.01 K/UL — SIGNIFICANT CHANGE UP (ref 0–0.2)
BASOPHILS NFR BLD AUTO: 0.2 % — SIGNIFICANT CHANGE UP (ref 0–2)
EOSINOPHIL # BLD AUTO: 0.03 K/UL — SIGNIFICANT CHANGE UP (ref 0–0.5)
EOSINOPHIL NFR BLD AUTO: 0.5 % — SIGNIFICANT CHANGE UP (ref 0–6)
HCT VFR BLD CALC: 39.1 % — SIGNIFICANT CHANGE UP (ref 39–50)
HGB BLD-MCNC: 13.7 G/DL — SIGNIFICANT CHANGE UP (ref 13–17)
IMM GRANULOCYTES NFR BLD AUTO: 0.7 % — SIGNIFICANT CHANGE UP (ref 0–1.5)
LYMPHOCYTES # BLD AUTO: 1.18 K/UL — SIGNIFICANT CHANGE UP (ref 1–3.3)
LYMPHOCYTES # BLD AUTO: 19.3 % — SIGNIFICANT CHANGE UP (ref 13–44)
MCHC RBC-ENTMCNC: 31.5 PG — SIGNIFICANT CHANGE UP (ref 27–34)
MCHC RBC-ENTMCNC: 35 G/DL — SIGNIFICANT CHANGE UP (ref 32–36)
MCV RBC AUTO: 89.9 FL — SIGNIFICANT CHANGE UP (ref 80–100)
MONOCYTES # BLD AUTO: 0.43 K/UL — SIGNIFICANT CHANGE UP (ref 0–0.9)
MONOCYTES NFR BLD AUTO: 7 % — SIGNIFICANT CHANGE UP (ref 2–14)
NEUTROPHILS # BLD AUTO: 4.42 K/UL — SIGNIFICANT CHANGE UP (ref 1.8–7.4)
NEUTROPHILS NFR BLD AUTO: 72.3 % — SIGNIFICANT CHANGE UP (ref 43–77)
NRBC # BLD: 0 /100 WBCS — SIGNIFICANT CHANGE UP (ref 0–0)
PLATELET # BLD AUTO: 226 K/UL — SIGNIFICANT CHANGE UP (ref 150–400)
RBC # BLD: 4.35 M/UL — SIGNIFICANT CHANGE UP (ref 4.2–5.8)
RBC # FLD: 12.3 % — SIGNIFICANT CHANGE UP (ref 10.3–14.5)
WBC # BLD: 6.11 K/UL — SIGNIFICANT CHANGE UP (ref 3.8–10.5)
WBC # FLD AUTO: 6.11 K/UL — SIGNIFICANT CHANGE UP (ref 3.8–10.5)

## 2021-06-18 ENCOUNTER — OUTPATIENT (OUTPATIENT)
Dept: OUTPATIENT SERVICES | Facility: HOSPITAL | Age: 36
LOS: 1 days | Discharge: HOME | End: 2021-06-18

## 2021-06-18 DIAGNOSIS — D64.9 ANEMIA, UNSPECIFIED: ICD-10-CM

## 2021-06-18 DIAGNOSIS — Z98.890 OTHER SPECIFIED POSTPROCEDURAL STATES: Chronic | ICD-10-CM

## 2021-06-22 ENCOUNTER — RESULT REVIEW (OUTPATIENT)
Age: 36
End: 2021-06-22

## 2021-06-22 ENCOUNTER — APPOINTMENT (OUTPATIENT)
Dept: HEMATOLOGY ONCOLOGY | Facility: CLINIC | Age: 36
End: 2021-06-22

## 2021-06-22 LAB
BASOPHILS # BLD AUTO: 0.01 K/UL — SIGNIFICANT CHANGE UP (ref 0–0.2)
BASOPHILS NFR BLD AUTO: 0.2 % — SIGNIFICANT CHANGE UP (ref 0–2)
EOSINOPHIL # BLD AUTO: 0.05 K/UL — SIGNIFICANT CHANGE UP (ref 0–0.5)
EOSINOPHIL NFR BLD AUTO: 1 % — SIGNIFICANT CHANGE UP (ref 0–6)
HCT VFR BLD CALC: 40.2 % — SIGNIFICANT CHANGE UP (ref 39–50)
HGB BLD-MCNC: 14.2 G/DL — SIGNIFICANT CHANGE UP (ref 13–17)
IMM GRANULOCYTES NFR BLD AUTO: 0.4 % — SIGNIFICANT CHANGE UP (ref 0–1.5)
LYMPHOCYTES # BLD AUTO: 1.97 K/UL — SIGNIFICANT CHANGE UP (ref 1–3.3)
LYMPHOCYTES # BLD AUTO: 38 % — SIGNIFICANT CHANGE UP (ref 13–44)
MCHC RBC-ENTMCNC: 31.9 PG — SIGNIFICANT CHANGE UP (ref 27–34)
MCHC RBC-ENTMCNC: 35.3 G/DL — SIGNIFICANT CHANGE UP (ref 32–36)
MCV RBC AUTO: 90.3 FL — SIGNIFICANT CHANGE UP (ref 80–100)
MONOCYTES # BLD AUTO: 0.51 K/UL — SIGNIFICANT CHANGE UP (ref 0–0.9)
MONOCYTES NFR BLD AUTO: 9.8 % — SIGNIFICANT CHANGE UP (ref 2–14)
NEUTROPHILS # BLD AUTO: 2.63 K/UL — SIGNIFICANT CHANGE UP (ref 1.8–7.4)
NEUTROPHILS NFR BLD AUTO: 50.6 % — SIGNIFICANT CHANGE UP (ref 43–77)
NRBC # BLD: 0 /100 WBCS — SIGNIFICANT CHANGE UP (ref 0–0)
PLATELET # BLD AUTO: 230 K/UL — SIGNIFICANT CHANGE UP (ref 150–400)
RBC # BLD: 4.45 M/UL — SIGNIFICANT CHANGE UP (ref 4.2–5.8)
RBC # FLD: 12.5 % — SIGNIFICANT CHANGE UP (ref 10.3–14.5)
WBC # BLD: 5.19 K/UL — SIGNIFICANT CHANGE UP (ref 3.8–10.5)
WBC # FLD AUTO: 5.19 K/UL — SIGNIFICANT CHANGE UP (ref 3.8–10.5)

## 2021-06-23 ENCOUNTER — NON-APPOINTMENT (OUTPATIENT)
Age: 36
End: 2021-06-23

## 2021-07-06 ENCOUNTER — RESULT REVIEW (OUTPATIENT)
Age: 36
End: 2021-07-06

## 2021-07-06 ENCOUNTER — APPOINTMENT (OUTPATIENT)
Dept: HEMATOLOGY ONCOLOGY | Facility: CLINIC | Age: 36
End: 2021-07-06

## 2021-07-06 LAB
BASOPHILS # BLD AUTO: 0.02 K/UL — SIGNIFICANT CHANGE UP (ref 0–0.2)
BASOPHILS NFR BLD AUTO: 0.3 % — SIGNIFICANT CHANGE UP (ref 0–2)
EOSINOPHIL # BLD AUTO: 0.22 K/UL — SIGNIFICANT CHANGE UP (ref 0–0.5)
EOSINOPHIL NFR BLD AUTO: 3.8 % — SIGNIFICANT CHANGE UP (ref 0–6)
HCT VFR BLD CALC: 39.9 % — SIGNIFICANT CHANGE UP (ref 39–50)
HGB BLD-MCNC: 13.9 G/DL — SIGNIFICANT CHANGE UP (ref 13–17)
IMM GRANULOCYTES NFR BLD AUTO: 0.5 % — SIGNIFICANT CHANGE UP (ref 0–1.5)
LYMPHOCYTES # BLD AUTO: 2.05 K/UL — SIGNIFICANT CHANGE UP (ref 1–3.3)
LYMPHOCYTES # BLD AUTO: 35.6 % — SIGNIFICANT CHANGE UP (ref 13–44)
MCHC RBC-ENTMCNC: 31.4 PG — SIGNIFICANT CHANGE UP (ref 27–34)
MCHC RBC-ENTMCNC: 34.8 G/DL — SIGNIFICANT CHANGE UP (ref 32–36)
MCV RBC AUTO: 90.1 FL — SIGNIFICANT CHANGE UP (ref 80–100)
MONOCYTES # BLD AUTO: 0.49 K/UL — SIGNIFICANT CHANGE UP (ref 0–0.9)
MONOCYTES NFR BLD AUTO: 8.5 % — SIGNIFICANT CHANGE UP (ref 2–14)
NEUTROPHILS # BLD AUTO: 2.95 K/UL — SIGNIFICANT CHANGE UP (ref 1.8–7.4)
NEUTROPHILS NFR BLD AUTO: 51.3 % — SIGNIFICANT CHANGE UP (ref 43–77)
NRBC # BLD: 0 /100 WBCS — SIGNIFICANT CHANGE UP (ref 0–0)
PLATELET # BLD AUTO: 221 K/UL — SIGNIFICANT CHANGE UP (ref 150–400)
RBC # BLD: 4.43 M/UL — SIGNIFICANT CHANGE UP (ref 4.2–5.8)
RBC # FLD: 12.2 % — SIGNIFICANT CHANGE UP (ref 10.3–14.5)
WBC # BLD: 5.76 K/UL — SIGNIFICANT CHANGE UP (ref 3.8–10.5)
WBC # FLD AUTO: 5.76 K/UL — SIGNIFICANT CHANGE UP (ref 3.8–10.5)

## 2021-07-16 ENCOUNTER — OUTPATIENT (OUTPATIENT)
Dept: OUTPATIENT SERVICES | Facility: HOSPITAL | Age: 36
LOS: 1 days | Discharge: HOME | End: 2021-07-16

## 2021-07-16 DIAGNOSIS — D64.9 ANEMIA, UNSPECIFIED: ICD-10-CM

## 2021-07-16 DIAGNOSIS — Z98.890 OTHER SPECIFIED POSTPROCEDURAL STATES: Chronic | ICD-10-CM

## 2021-07-19 ENCOUNTER — APPOINTMENT (OUTPATIENT)
Dept: HEMATOLOGY ONCOLOGY | Facility: CLINIC | Age: 36
End: 2021-07-19
Payer: COMMERCIAL

## 2021-07-19 ENCOUNTER — RESULT REVIEW (OUTPATIENT)
Age: 36
End: 2021-07-19

## 2021-07-19 VITALS
RESPIRATION RATE: 16 BRPM | WEIGHT: 227.07 LBS | BODY MASS INDEX: 34.41 KG/M2 | OXYGEN SATURATION: 98 % | HEIGHT: 68.19 IN | SYSTOLIC BLOOD PRESSURE: 127 MMHG | DIASTOLIC BLOOD PRESSURE: 75 MMHG | TEMPERATURE: 98.2 F | HEART RATE: 98 BPM

## 2021-07-19 LAB
BASOPHILS # BLD AUTO: 0.01 K/UL — SIGNIFICANT CHANGE UP (ref 0–0.2)
BASOPHILS NFR BLD AUTO: 0.2 % — SIGNIFICANT CHANGE UP (ref 0–2)
EOSINOPHIL # BLD AUTO: 0.1 K/UL — SIGNIFICANT CHANGE UP (ref 0–0.5)
EOSINOPHIL NFR BLD AUTO: 1.9 % — SIGNIFICANT CHANGE UP (ref 0–6)
HCT VFR BLD CALC: 40.4 % — SIGNIFICANT CHANGE UP (ref 39–50)
HGB BLD-MCNC: 14.2 G/DL — SIGNIFICANT CHANGE UP (ref 13–17)
IMM GRANULOCYTES NFR BLD AUTO: 0.6 % — SIGNIFICANT CHANGE UP (ref 0–1.5)
LYMPHOCYTES # BLD AUTO: 1.79 K/UL — SIGNIFICANT CHANGE UP (ref 1–3.3)
LYMPHOCYTES # BLD AUTO: 34 % — SIGNIFICANT CHANGE UP (ref 13–44)
MCHC RBC-ENTMCNC: 31.1 PG — SIGNIFICANT CHANGE UP (ref 27–34)
MCHC RBC-ENTMCNC: 35.1 G/DL — SIGNIFICANT CHANGE UP (ref 32–36)
MCV RBC AUTO: 88.6 FL — SIGNIFICANT CHANGE UP (ref 80–100)
MONOCYTES # BLD AUTO: 0.51 K/UL — SIGNIFICANT CHANGE UP (ref 0–0.9)
MONOCYTES NFR BLD AUTO: 9.7 % — SIGNIFICANT CHANGE UP (ref 2–14)
NEUTROPHILS # BLD AUTO: 2.82 K/UL — SIGNIFICANT CHANGE UP (ref 1.8–7.4)
NEUTROPHILS NFR BLD AUTO: 53.6 % — SIGNIFICANT CHANGE UP (ref 43–77)
NRBC # BLD: 0 /100 WBCS — SIGNIFICANT CHANGE UP (ref 0–0)
PLATELET # BLD AUTO: 232 K/UL — SIGNIFICANT CHANGE UP (ref 150–400)
RBC # BLD: 4.56 M/UL — SIGNIFICANT CHANGE UP (ref 4.2–5.8)
RBC # FLD: 12.3 % — SIGNIFICANT CHANGE UP (ref 10.3–14.5)
WBC # BLD: 5.26 K/UL — SIGNIFICANT CHANGE UP (ref 3.8–10.5)
WBC # FLD AUTO: 5.26 K/UL — SIGNIFICANT CHANGE UP (ref 3.8–10.5)

## 2021-07-19 PROCEDURE — 99213 OFFICE O/P EST LOW 20 MIN: CPT

## 2021-07-19 RX ORDER — CETIRIZINE HCL 10 MG
10 TABLET ORAL
Refills: 0 | Status: DISCONTINUED | COMMUNITY
End: 2021-07-19

## 2021-07-20 ENCOUNTER — APPOINTMENT (OUTPATIENT)
Dept: HEMATOLOGY ONCOLOGY | Facility: CLINIC | Age: 36
End: 2021-07-20

## 2021-07-25 LAB
ALBUMIN SERPL ELPH-MCNC: 4.7 G/DL
ALP BLD-CCNC: 81 U/L
ALT SERPL-CCNC: 27 U/L
ANION GAP SERPL CALC-SCNC: 11 MMOL/L
AST SERPL-CCNC: 16 U/L
BILIRUB SERPL-MCNC: 0.5 MG/DL
BUN SERPL-MCNC: 16 MG/DL
CALCIUM SERPL-MCNC: 9.6 MG/DL
CHLORIDE SERPL-SCNC: 101 MMOL/L
CO2 SERPL-SCNC: 26 MMOL/L
CREAT SERPL-MCNC: 1.08 MG/DL
GLUCOSE SERPL-MCNC: 131 MG/DL
LDH SERPL-CCNC: 178 U/L
POTASSIUM SERPL-SCNC: 4.1 MMOL/L
PROT SERPL-MCNC: 7.2 G/DL
SODIUM SERPL-SCNC: 138 MMOL/L
URATE SERPL-MCNC: 8 MG/DL

## 2021-08-02 NOTE — ASSESSMENT
[FreeTextEntry1] : 34 y/o M presented April 2020 with aplastic anemia (very severe AA) confirmed on bone marrow biopsy at [facility] and now s/p inpatient treatment beginning 4/23/2020 with horse ATG + CsA + promacta with complete remission. Bone marrow bx on 11/9/20 revealed hypocellular marrow, overall cellularity improved (20-50%) showing marrow regeneration. BMBx on 5/18/2021 showing normal morphology with normal cellularity. \par \par -Hematologic parameters back to normal, excellent response to IST. No longer requiring any transfusional support. \par -Patient is over a year out from diagnosis now. With normal bone marrow biopsy, will now begin cyclosporine taper. Will taper slowly, plan will be to decrease by 50 mg total dose every 2 weeks and check CBC every 2 weeks. With this schedule will plan to be off cyclosporine by end of September 2021. Discussed this with patient and elda out taper schedule. He agrees with this plan. He will be starting 1mL/1.5mL tomorrow\par -Promacta was planned for 6 months of therapy, and was successfully discontinued. Platelets have been stable.\par -Continue Acyclovir while on CSA\par -COVID antibodies previously checked and positive, may wait until after taper for vaccine as may be more effective. \par -RTC in 2 months. \par

## 2021-08-02 NOTE — PHYSICAL EXAM
[Fully active, able to carry on all pre-disease performance without restriction] : Status 0 - Fully active, able to carry on all pre-disease performance without restriction [Normal] : affect appropriate [de-identified] : JEANETH [de-identified] : (+) S1S2 RRR [de-identified] : supple [de-identified] : no edema (+)pp [de-identified] : no tenderness [de-identified] : ROM intact [de-identified] : warm/dry [de-identified] : A&Ox3

## 2021-08-02 NOTE — HISTORY OF PRESENT ILLNESS
[Disease:__________________________] : Disease: [unfilled] [Therapy: ___] : Therapy: [unfilled] [de-identified] : 33 y/o M with no PMH presented to OSH originally for c/o dizziness and feeling unwell and was then transferred to [facility] for further hematologic evaluation as patient was found to be severely pancytopenic. Patient first started noticing scattered bruising throughout his body (mostly thighs and arms) about 6 weeks prior to presentation, and also had epistaxis. Pt had also noticed intermittent fevers and chills, responsive to tylenol, and noticed very dark stools for the same time period. Also with spontaneous gum bleeding and endorsed severe fatigue and SOB with exertion. Also noted 15 lbs weight loss, partially intentional over past 2-3 months prior to presentation.  Also c/o mild headache located in posterior aspect of his head, denies sore throat, cough, +nausea but no vomiting, no abdominal pain, no diarrhea, no BRBPR, no urinary symptoms, no LE swelling, +visual changes.     \par \par Patient was transferred to [facility] from [facility] on 4/18. Patient was started on IVF, Levaquin, and allopurinol. Patient transfused blood and platelets. Bone Marrow biopsy performed on 4/20 consistent with Aplastic Anemia. 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 Because of this, patient's platelet transfusion threshold was made 50k. Horse ATG test dose was applied 4/22 with no reaction. Treatment started 4/23 consisting of equine ATG at 40mg/kg/day x 4 days, Cyclosporine 10mg/kg/day divided doses BID, Eltrombopag 150mg daily. Patient had episode of hives with ATG infusion on Day 1 thus steroid regimen was changed from oral prednisone to IV Solumedrol Q8 for the duration of ATG treatment and transitioned back to oral prednisone 1mg/kg daily for days 5-10 then tapered. The patient had grade 3 transaminitis likely from Promacta and ATG and Promacta was held as of 5/1. While patient was in the hospital, his course was also complicated by epigastric pain consistent with dyspepsia, most likely due to cyclosporine. Was managed on pepcid BID and sucralfate q6 hours and mylanta as needed with good relief. \par \par On 5/12 the patient's transaminitis had improved(grade 2 total bili, grade 1 AST, grade 2 ALT) and Promacta 150 mg po QD was resumed. Pt's cyclosporine dose was adjusted  according to the level (goal 200-400) and he was on 400 mg po q12h as of 5/18. On 5/16, patient developed chest discomfort during platelet transfusion which improved with benadryl. EKG and CXR were wnl. Patient originally refused benadryl pre-medication prior to platelets that day due to side effects with taking Benadryl, and it was decided to premedicate pt with Claritin or Zyrtec pre blood transfusion instead. On 5/20, cyclosporine level was 460, therefore dose was lowered to 300 mg PO BID. Patient's vision had improved, so platelet transfusion threshold liberalized to 20k. \par \par Patient has since had a complete recovery of blood counts. He discontinued Promacta after 6 months of therapy. He has been monitored since then wit stable CBC and therapeutic cyclosporine levels. \par \par He has since had repeat bone marrow biopsy on 5/18/2021 with normal morphology and cellularity\par  [de-identified] : - Markedly hypocellular bone marrow (mostly 5% and focal 10-\par 15%) with focal minimal erythropoiesis, minimal to absent\par myelopoiesis, absent megakaryocytes, increased iron stores. Aplastic bone marrow.  [de-identified] : Cytogenetics: \par \par Result: Male karyotype\par Karyotype: 46,XY             {12             } [de-identified] : very severe Aplastic Anemia [FreeTextEntry1] : Therapy initiated 4/23/2020 [de-identified] : Patient without complaints today doing well.

## 2021-08-03 ENCOUNTER — APPOINTMENT (OUTPATIENT)
Dept: HEMATOLOGY ONCOLOGY | Facility: CLINIC | Age: 36
End: 2021-08-03

## 2021-08-03 ENCOUNTER — RESULT REVIEW (OUTPATIENT)
Age: 36
End: 2021-08-03

## 2021-08-03 LAB
BASOPHILS # BLD AUTO: 0.01 K/UL — SIGNIFICANT CHANGE UP (ref 0–0.2)
BASOPHILS NFR BLD AUTO: 0.2 % — SIGNIFICANT CHANGE UP (ref 0–2)
EOSINOPHIL # BLD AUTO: 0.06 K/UL — SIGNIFICANT CHANGE UP (ref 0–0.5)
EOSINOPHIL NFR BLD AUTO: 1.3 % — SIGNIFICANT CHANGE UP (ref 0–6)
HCT VFR BLD CALC: 41.8 % — SIGNIFICANT CHANGE UP (ref 39–50)
HGB BLD-MCNC: 14.7 G/DL — SIGNIFICANT CHANGE UP (ref 13–17)
IMM GRANULOCYTES NFR BLD AUTO: 0.4 % — SIGNIFICANT CHANGE UP (ref 0–1.5)
LYMPHOCYTES # BLD AUTO: 1.65 K/UL — SIGNIFICANT CHANGE UP (ref 1–3.3)
LYMPHOCYTES # BLD AUTO: 35.9 % — SIGNIFICANT CHANGE UP (ref 13–44)
MCHC RBC-ENTMCNC: 31.3 PG — SIGNIFICANT CHANGE UP (ref 27–34)
MCHC RBC-ENTMCNC: 35.2 G/DL — SIGNIFICANT CHANGE UP (ref 32–36)
MCV RBC AUTO: 89.1 FL — SIGNIFICANT CHANGE UP (ref 80–100)
MONOCYTES # BLD AUTO: 0.37 K/UL — SIGNIFICANT CHANGE UP (ref 0–0.9)
MONOCYTES NFR BLD AUTO: 8.1 % — SIGNIFICANT CHANGE UP (ref 2–14)
NEUTROPHILS # BLD AUTO: 2.48 K/UL — SIGNIFICANT CHANGE UP (ref 1.8–7.4)
NEUTROPHILS NFR BLD AUTO: 54.1 % — SIGNIFICANT CHANGE UP (ref 43–77)
NRBC # BLD: 0 /100 WBCS — SIGNIFICANT CHANGE UP (ref 0–0)
PLATELET # BLD AUTO: 227 K/UL — SIGNIFICANT CHANGE UP (ref 150–400)
RBC # BLD: 4.69 M/UL — SIGNIFICANT CHANGE UP (ref 4.2–5.8)
RBC # FLD: 12.5 % — SIGNIFICANT CHANGE UP (ref 10.3–14.5)
WBC # BLD: 4.59 K/UL — SIGNIFICANT CHANGE UP (ref 3.8–10.5)
WBC # FLD AUTO: 4.59 K/UL — SIGNIFICANT CHANGE UP (ref 3.8–10.5)

## 2021-08-13 ENCOUNTER — OUTPATIENT (OUTPATIENT)
Dept: OUTPATIENT SERVICES | Facility: HOSPITAL | Age: 36
LOS: 1 days | Discharge: HOME | End: 2021-08-13

## 2021-08-13 DIAGNOSIS — Z98.890 OTHER SPECIFIED POSTPROCEDURAL STATES: Chronic | ICD-10-CM

## 2021-08-13 DIAGNOSIS — D64.9 ANEMIA, UNSPECIFIED: ICD-10-CM

## 2021-08-17 ENCOUNTER — RESULT REVIEW (OUTPATIENT)
Age: 36
End: 2021-08-17

## 2021-08-17 ENCOUNTER — APPOINTMENT (OUTPATIENT)
Dept: HEMATOLOGY ONCOLOGY | Facility: CLINIC | Age: 36
End: 2021-08-17

## 2021-08-17 LAB
BASOPHILS # BLD AUTO: 0.01 K/UL — SIGNIFICANT CHANGE UP (ref 0–0.2)
BASOPHILS NFR BLD AUTO: 0.2 % — SIGNIFICANT CHANGE UP (ref 0–2)
EOSINOPHIL # BLD AUTO: 0.07 K/UL — SIGNIFICANT CHANGE UP (ref 0–0.5)
EOSINOPHIL NFR BLD AUTO: 1.6 % — SIGNIFICANT CHANGE UP (ref 0–6)
HCT VFR BLD CALC: 42.1 % — SIGNIFICANT CHANGE UP (ref 39–50)
HGB BLD-MCNC: 14.6 G/DL — SIGNIFICANT CHANGE UP (ref 13–17)
IMM GRANULOCYTES NFR BLD AUTO: 0.2 % — SIGNIFICANT CHANGE UP (ref 0–1.5)
LYMPHOCYTES # BLD AUTO: 1.75 K/UL — SIGNIFICANT CHANGE UP (ref 1–3.3)
LYMPHOCYTES # BLD AUTO: 40 % — SIGNIFICANT CHANGE UP (ref 13–44)
MCHC RBC-ENTMCNC: 31.1 PG — SIGNIFICANT CHANGE UP (ref 27–34)
MCHC RBC-ENTMCNC: 34.7 G/DL — SIGNIFICANT CHANGE UP (ref 32–36)
MCV RBC AUTO: 89.6 FL — SIGNIFICANT CHANGE UP (ref 80–100)
MONOCYTES # BLD AUTO: 0.53 K/UL — SIGNIFICANT CHANGE UP (ref 0–0.9)
MONOCYTES NFR BLD AUTO: 12.1 % — SIGNIFICANT CHANGE UP (ref 2–14)
NEUTROPHILS # BLD AUTO: 2.01 K/UL — SIGNIFICANT CHANGE UP (ref 1.8–7.4)
NEUTROPHILS NFR BLD AUTO: 45.9 % — SIGNIFICANT CHANGE UP (ref 43–77)
NRBC # BLD: 0 /100 WBCS — SIGNIFICANT CHANGE UP (ref 0–0)
PLATELET # BLD AUTO: 222 K/UL — SIGNIFICANT CHANGE UP (ref 150–400)
RBC # BLD: 4.7 M/UL — SIGNIFICANT CHANGE UP (ref 4.2–5.8)
RBC # FLD: 12.4 % — SIGNIFICANT CHANGE UP (ref 10.3–14.5)
WBC # BLD: 4.38 K/UL — SIGNIFICANT CHANGE UP (ref 3.8–10.5)
WBC # FLD AUTO: 4.38 K/UL — SIGNIFICANT CHANGE UP (ref 3.8–10.5)

## 2021-08-24 LAB
ALBUMIN SERPL ELPH-MCNC: 4.8 G/DL
ALP BLD-CCNC: 82 U/L
ALT SERPL-CCNC: 28 U/L
ANION GAP SERPL CALC-SCNC: 14 MMOL/L
AST SERPL-CCNC: 19 U/L
BILIRUB SERPL-MCNC: 0.7 MG/DL
BUN SERPL-MCNC: 15 MG/DL
CALCIUM SERPL-MCNC: 9.6 MG/DL
CHLORIDE SERPL-SCNC: 102 MMOL/L
CO2 SERPL-SCNC: 23 MMOL/L
CREAT SERPL-MCNC: 1.04 MG/DL
GLUCOSE SERPL-MCNC: 108 MG/DL
POTASSIUM SERPL-SCNC: 4.3 MMOL/L
PROT SERPL-MCNC: 7.3 G/DL
SODIUM SERPL-SCNC: 139 MMOL/L

## 2021-08-31 ENCOUNTER — RESULT REVIEW (OUTPATIENT)
Age: 36
End: 2021-08-31

## 2021-08-31 ENCOUNTER — APPOINTMENT (OUTPATIENT)
Dept: HEMATOLOGY ONCOLOGY | Facility: CLINIC | Age: 36
End: 2021-08-31

## 2021-08-31 LAB
BASOPHILS # BLD AUTO: 0.01 K/UL — SIGNIFICANT CHANGE UP (ref 0–0.2)
BASOPHILS NFR BLD AUTO: 0.2 % — SIGNIFICANT CHANGE UP (ref 0–2)
EOSINOPHIL # BLD AUTO: 0.07 K/UL — SIGNIFICANT CHANGE UP (ref 0–0.5)
EOSINOPHIL NFR BLD AUTO: 1.4 % — SIGNIFICANT CHANGE UP (ref 0–6)
HCT VFR BLD CALC: 40.3 % — SIGNIFICANT CHANGE UP (ref 39–50)
HGB BLD-MCNC: 14 G/DL — SIGNIFICANT CHANGE UP (ref 13–17)
IMM GRANULOCYTES NFR BLD AUTO: 0.6 % — SIGNIFICANT CHANGE UP (ref 0–1.5)
LYMPHOCYTES # BLD AUTO: 1.97 K/UL — SIGNIFICANT CHANGE UP (ref 1–3.3)
LYMPHOCYTES # BLD AUTO: 38.8 % — SIGNIFICANT CHANGE UP (ref 13–44)
MCHC RBC-ENTMCNC: 31.3 PG — SIGNIFICANT CHANGE UP (ref 27–34)
MCHC RBC-ENTMCNC: 34.7 G/DL — SIGNIFICANT CHANGE UP (ref 32–36)
MCV RBC AUTO: 90.2 FL — SIGNIFICANT CHANGE UP (ref 80–100)
MONOCYTES # BLD AUTO: 0.56 K/UL — SIGNIFICANT CHANGE UP (ref 0–0.9)
MONOCYTES NFR BLD AUTO: 11 % — SIGNIFICANT CHANGE UP (ref 2–14)
NEUTROPHILS # BLD AUTO: 2.44 K/UL — SIGNIFICANT CHANGE UP (ref 1.8–7.4)
NEUTROPHILS NFR BLD AUTO: 48 % — SIGNIFICANT CHANGE UP (ref 43–77)
NRBC # BLD: 0 /100 WBCS — SIGNIFICANT CHANGE UP (ref 0–0)
PLATELET # BLD AUTO: 233 K/UL — SIGNIFICANT CHANGE UP (ref 150–400)
RBC # BLD: 4.47 M/UL — SIGNIFICANT CHANGE UP (ref 4.2–5.8)
RBC # FLD: 12.6 % — SIGNIFICANT CHANGE UP (ref 10.3–14.5)
WBC # BLD: 5.08 K/UL — SIGNIFICANT CHANGE UP (ref 3.8–10.5)
WBC # FLD AUTO: 5.08 K/UL — SIGNIFICANT CHANGE UP (ref 3.8–10.5)

## 2021-09-08 LAB
ALBUMIN SERPL ELPH-MCNC: 4.7 G/DL
ALP BLD-CCNC: 73 U/L
ALT SERPL-CCNC: 43 U/L
ANION GAP SERPL CALC-SCNC: 10 MMOL/L
AST SERPL-CCNC: 29 U/L
BILIRUB SERPL-MCNC: 0.5 MG/DL
BUN SERPL-MCNC: 15 MG/DL
CALCIUM SERPL-MCNC: 9.7 MG/DL
CHLORIDE SERPL-SCNC: 104 MMOL/L
CO2 SERPL-SCNC: 25 MMOL/L
CREAT SERPL-MCNC: 1.02 MG/DL
GLUCOSE SERPL-MCNC: 108 MG/DL
POTASSIUM SERPL-SCNC: 4.1 MMOL/L
PROT SERPL-MCNC: 7 G/DL
SODIUM SERPL-SCNC: 140 MMOL/L

## 2021-09-12 ENCOUNTER — OUTPATIENT (OUTPATIENT)
Dept: OUTPATIENT SERVICES | Facility: HOSPITAL | Age: 36
LOS: 1 days | Discharge: HOME | End: 2021-09-12

## 2021-09-12 DIAGNOSIS — D64.9 ANEMIA, UNSPECIFIED: ICD-10-CM

## 2021-09-12 DIAGNOSIS — Z98.890 OTHER SPECIFIED POSTPROCEDURAL STATES: Chronic | ICD-10-CM

## 2021-09-14 ENCOUNTER — APPOINTMENT (OUTPATIENT)
Dept: HEMATOLOGY ONCOLOGY | Facility: CLINIC | Age: 36
End: 2021-09-14

## 2021-09-28 ENCOUNTER — RESULT REVIEW (OUTPATIENT)
Age: 36
End: 2021-09-28

## 2021-09-28 ENCOUNTER — APPOINTMENT (OUTPATIENT)
Dept: HEMATOLOGY ONCOLOGY | Facility: CLINIC | Age: 36
End: 2021-09-28

## 2021-09-28 LAB
BASOPHILS # BLD AUTO: 0.02 K/UL — SIGNIFICANT CHANGE UP (ref 0–0.2)
BASOPHILS NFR BLD AUTO: 0.4 % — SIGNIFICANT CHANGE UP (ref 0–2)
EOSINOPHIL # BLD AUTO: 0.07 K/UL — SIGNIFICANT CHANGE UP (ref 0–0.5)
EOSINOPHIL NFR BLD AUTO: 1.3 % — SIGNIFICANT CHANGE UP (ref 0–6)
HCT VFR BLD CALC: 43.9 % — SIGNIFICANT CHANGE UP (ref 39–50)
HGB BLD-MCNC: 15.1 G/DL — SIGNIFICANT CHANGE UP (ref 13–17)
IMM GRANULOCYTES NFR BLD AUTO: 0.6 % — SIGNIFICANT CHANGE UP (ref 0–1.5)
LYMPHOCYTES # BLD AUTO: 1.9 K/UL — SIGNIFICANT CHANGE UP (ref 1–3.3)
LYMPHOCYTES # BLD AUTO: 35.8 % — SIGNIFICANT CHANGE UP (ref 13–44)
MCHC RBC-ENTMCNC: 30.4 PG — SIGNIFICANT CHANGE UP (ref 27–34)
MCHC RBC-ENTMCNC: 34.4 G/DL — SIGNIFICANT CHANGE UP (ref 32–36)
MCV RBC AUTO: 88.5 FL — SIGNIFICANT CHANGE UP (ref 80–100)
MONOCYTES # BLD AUTO: 0.51 K/UL — SIGNIFICANT CHANGE UP (ref 0–0.9)
MONOCYTES NFR BLD AUTO: 9.6 % — SIGNIFICANT CHANGE UP (ref 2–14)
NEUTROPHILS # BLD AUTO: 2.77 K/UL — SIGNIFICANT CHANGE UP (ref 1.8–7.4)
NEUTROPHILS NFR BLD AUTO: 52.3 % — SIGNIFICANT CHANGE UP (ref 43–77)
NRBC # BLD: 0 /100 WBCS — SIGNIFICANT CHANGE UP (ref 0–0)
PLATELET # BLD AUTO: 235 K/UL — SIGNIFICANT CHANGE UP (ref 150–400)
RBC # BLD: 4.96 M/UL — SIGNIFICANT CHANGE UP (ref 4.2–5.8)
RBC # FLD: 12.6 % — SIGNIFICANT CHANGE UP (ref 10.3–14.5)
WBC # BLD: 5.3 K/UL — SIGNIFICANT CHANGE UP (ref 3.8–10.5)
WBC # FLD AUTO: 5.3 K/UL — SIGNIFICANT CHANGE UP (ref 3.8–10.5)

## 2021-09-29 LAB
ALBUMIN SERPL ELPH-MCNC: 4.8 G/DL
ALP BLD-CCNC: 78 U/L
ALT SERPL-CCNC: 37 U/L
ANION GAP SERPL CALC-SCNC: 12 MMOL/L
AST SERPL-CCNC: 20 U/L
BILIRUB SERPL-MCNC: 0.5 MG/DL
BUN SERPL-MCNC: 14 MG/DL
CALCIUM SERPL-MCNC: 9.7 MG/DL
CHLORIDE SERPL-SCNC: 101 MMOL/L
CO2 SERPL-SCNC: 27 MMOL/L
CREAT SERPL-MCNC: 1.07 MG/DL
CYCLOSPORINE SER-MCNC: <30 NG/ML
GLUCOSE SERPL-MCNC: 105 MG/DL
LDH SERPL-CCNC: 179 U/L
POTASSIUM SERPL-SCNC: 4.2 MMOL/L
PROT SERPL-MCNC: 7.2 G/DL
SODIUM SERPL-SCNC: 140 MMOL/L
URATE SERPL-MCNC: 8.7 MG/DL

## 2021-10-12 ENCOUNTER — APPOINTMENT (OUTPATIENT)
Dept: HEMATOLOGY ONCOLOGY | Facility: CLINIC | Age: 36
End: 2021-10-12

## 2021-10-26 ENCOUNTER — RESULT REVIEW (OUTPATIENT)
Age: 36
End: 2021-10-26

## 2021-10-26 ENCOUNTER — OUTPATIENT (OUTPATIENT)
Dept: OUTPATIENT SERVICES | Facility: HOSPITAL | Age: 36
LOS: 1 days | Discharge: HOME | End: 2021-10-26

## 2021-10-26 ENCOUNTER — APPOINTMENT (OUTPATIENT)
Dept: HEMATOLOGY ONCOLOGY | Facility: CLINIC | Age: 36
End: 2021-10-26

## 2021-10-26 DIAGNOSIS — D64.9 ANEMIA, UNSPECIFIED: ICD-10-CM

## 2021-10-26 DIAGNOSIS — Z98.890 OTHER SPECIFIED POSTPROCEDURAL STATES: Chronic | ICD-10-CM

## 2021-10-26 LAB
BASOPHILS # BLD AUTO: 0 K/UL — SIGNIFICANT CHANGE UP (ref 0–0.2)
BASOPHILS NFR BLD AUTO: 0 % — SIGNIFICANT CHANGE UP (ref 0–2)
EOSINOPHIL # BLD AUTO: 0.32 K/UL — SIGNIFICANT CHANGE UP (ref 0–0.5)
EOSINOPHIL NFR BLD AUTO: 7 % — HIGH (ref 0–6)
HCT VFR BLD CALC: 42.4 % — SIGNIFICANT CHANGE UP (ref 39–50)
HGB BLD-MCNC: 14.5 G/DL — SIGNIFICANT CHANGE UP (ref 13–17)
LYMPHOCYTES # BLD AUTO: 2.09 K/UL — SIGNIFICANT CHANGE UP (ref 1–3.3)
LYMPHOCYTES # BLD AUTO: 46 % — HIGH (ref 13–44)
MCHC RBC-ENTMCNC: 30.9 PG — SIGNIFICANT CHANGE UP (ref 27–34)
MCHC RBC-ENTMCNC: 34.2 G/DL — SIGNIFICANT CHANGE UP (ref 32–36)
MCV RBC AUTO: 90.2 FL — SIGNIFICANT CHANGE UP (ref 80–100)
MONOCYTES # BLD AUTO: 0.36 K/UL — SIGNIFICANT CHANGE UP (ref 0–0.9)
MONOCYTES NFR BLD AUTO: 8 % — SIGNIFICANT CHANGE UP (ref 2–14)
NEUTROPHILS # BLD AUTO: 1.77 K/UL — LOW (ref 1.8–7.4)
NEUTROPHILS NFR BLD AUTO: 39 % — LOW (ref 43–77)
NRBC # BLD: 0 /100 — SIGNIFICANT CHANGE UP (ref 0–0)
NRBC # BLD: SIGNIFICANT CHANGE UP /100 WBCS (ref 0–0)
PLAT MORPH BLD: NORMAL — SIGNIFICANT CHANGE UP
PLATELET # BLD AUTO: 136 K/UL — LOW (ref 150–400)
RBC # BLD: 4.7 M/UL — SIGNIFICANT CHANGE UP (ref 4.2–5.8)
RBC # FLD: 12.6 % — SIGNIFICANT CHANGE UP (ref 10.3–14.5)
RBC BLD AUTO: SIGNIFICANT CHANGE UP
WBC # BLD: 4.54 K/UL — SIGNIFICANT CHANGE UP (ref 3.8–10.5)
WBC # FLD AUTO: 4.54 K/UL — SIGNIFICANT CHANGE UP (ref 3.8–10.5)

## 2021-10-29 LAB
ALBUMIN SERPL ELPH-MCNC: 4.7 G/DL
ALP BLD-CCNC: 70 U/L
ALT SERPL-CCNC: 29 U/L
ANION GAP SERPL CALC-SCNC: 9 MMOL/L
AST SERPL-CCNC: 17 U/L
BILIRUB SERPL-MCNC: 0.3 MG/DL
BUN SERPL-MCNC: 12 MG/DL
CALCIUM SERPL-MCNC: 9.3 MG/DL
CHLORIDE SERPL-SCNC: 106 MMOL/L
CO2 SERPL-SCNC: 25 MMOL/L
CREAT SERPL-MCNC: 0.9 MG/DL
GLUCOSE SERPL-MCNC: 99 MG/DL
POTASSIUM SERPL-SCNC: 4.4 MMOL/L
PROT SERPL-MCNC: 7.1 G/DL
SODIUM SERPL-SCNC: 140 MMOL/L

## 2021-11-22 ENCOUNTER — RESULT REVIEW (OUTPATIENT)
Age: 36
End: 2021-11-22

## 2021-11-22 ENCOUNTER — NON-APPOINTMENT (OUTPATIENT)
Age: 36
End: 2021-11-22

## 2021-11-22 ENCOUNTER — OUTPATIENT (OUTPATIENT)
Dept: OUTPATIENT SERVICES | Facility: HOSPITAL | Age: 36
LOS: 1 days | End: 2021-11-22
Payer: COMMERCIAL

## 2021-11-22 ENCOUNTER — APPOINTMENT (OUTPATIENT)
Dept: INFUSION THERAPY | Facility: HOSPITAL | Age: 36
End: 2021-11-22

## 2021-11-22 ENCOUNTER — APPOINTMENT (OUTPATIENT)
Dept: HEMATOLOGY ONCOLOGY | Facility: CLINIC | Age: 36
End: 2021-11-22

## 2021-11-22 DIAGNOSIS — D61.9 APLASTIC ANEMIA, UNSPECIFIED: ICD-10-CM

## 2021-11-22 DIAGNOSIS — Z98.890 OTHER SPECIFIED POSTPROCEDURAL STATES: Chronic | ICD-10-CM

## 2021-11-22 LAB
BASOPHILS # BLD AUTO: 0 K/UL — SIGNIFICANT CHANGE UP (ref 0–0.2)
BASOPHILS NFR BLD AUTO: 0 % — SIGNIFICANT CHANGE UP (ref 0–2)
EOSINOPHIL # BLD AUTO: 0.07 K/UL — SIGNIFICANT CHANGE UP (ref 0–0.5)
EOSINOPHIL NFR BLD AUTO: 1.6 % — SIGNIFICANT CHANGE UP (ref 0–6)
HCT VFR BLD CALC: 38.3 % — LOW (ref 39–50)
HGB BLD-MCNC: 13.4 G/DL — SIGNIFICANT CHANGE UP (ref 13–17)
IMM GRANULOCYTES NFR BLD AUTO: 0.2 % — SIGNIFICANT CHANGE UP (ref 0–1.5)
LYMPHOCYTES # BLD AUTO: 2.34 K/UL — SIGNIFICANT CHANGE UP (ref 1–3.3)
LYMPHOCYTES # BLD AUTO: 52.1 % — HIGH (ref 13–44)
MCHC RBC-ENTMCNC: 31.2 PG — SIGNIFICANT CHANGE UP (ref 27–34)
MCHC RBC-ENTMCNC: 35 G/DL — SIGNIFICANT CHANGE UP (ref 32–36)
MCV RBC AUTO: 89.3 FL — SIGNIFICANT CHANGE UP (ref 80–100)
MONOCYTES # BLD AUTO: 0.2 K/UL — SIGNIFICANT CHANGE UP (ref 0–0.9)
MONOCYTES NFR BLD AUTO: 4.5 % — SIGNIFICANT CHANGE UP (ref 2–14)
NEUTROPHILS # BLD AUTO: 1.87 K/UL — SIGNIFICANT CHANGE UP (ref 1.8–7.4)
NEUTROPHILS NFR BLD AUTO: 41.6 % — LOW (ref 43–77)
NRBC # BLD: 0 /100 WBCS — SIGNIFICANT CHANGE UP (ref 0–0)
PLATELET # BLD AUTO: 7 K/UL — CRITICAL LOW (ref 150–400)
RBC # BLD: 4.29 M/UL — SIGNIFICANT CHANGE UP (ref 4.2–5.8)
RBC # FLD: 12.9 % — SIGNIFICANT CHANGE UP (ref 10.3–14.5)
WBC # BLD: 4.49 K/UL — SIGNIFICANT CHANGE UP (ref 3.8–10.5)
WBC # FLD AUTO: 4.49 K/UL — SIGNIFICANT CHANGE UP (ref 3.8–10.5)

## 2021-11-22 PROCEDURE — 86901 BLOOD TYPING SEROLOGIC RH(D): CPT

## 2021-11-22 PROCEDURE — 86078 PHYS BLOOD BANK SERV REACTJ: CPT

## 2021-11-22 PROCEDURE — 86850 RBC ANTIBODY SCREEN: CPT

## 2021-11-22 PROCEDURE — 86900 BLOOD TYPING SEROLOGIC ABO: CPT

## 2021-11-23 DIAGNOSIS — R11.2 NAUSEA WITH VOMITING, UNSPECIFIED: ICD-10-CM

## 2021-11-23 DIAGNOSIS — D50.9 IRON DEFICIENCY ANEMIA, UNSPECIFIED: ICD-10-CM

## 2021-11-24 ENCOUNTER — RESULT REVIEW (OUTPATIENT)
Age: 36
End: 2021-11-24

## 2021-11-24 ENCOUNTER — OUTPATIENT (OUTPATIENT)
Dept: OUTPATIENT SERVICES | Facility: HOSPITAL | Age: 36
LOS: 1 days | Discharge: HOME | End: 2021-11-24

## 2021-11-24 ENCOUNTER — APPOINTMENT (OUTPATIENT)
Dept: INFUSION THERAPY | Facility: HOSPITAL | Age: 36
End: 2021-11-24

## 2021-11-24 DIAGNOSIS — D64.9 ANEMIA, UNSPECIFIED: ICD-10-CM

## 2021-11-24 DIAGNOSIS — Z98.890 OTHER SPECIFIED POSTPROCEDURAL STATES: Chronic | ICD-10-CM

## 2021-11-24 LAB
BASOPHILS # BLD AUTO: 0 K/UL — SIGNIFICANT CHANGE UP (ref 0–0.2)
BASOPHILS NFR BLD AUTO: 0 % — SIGNIFICANT CHANGE UP (ref 0–2)
EOSINOPHIL # BLD AUTO: 0.03 K/UL — SIGNIFICANT CHANGE UP (ref 0–0.5)
EOSINOPHIL NFR BLD AUTO: 1 % — SIGNIFICANT CHANGE UP (ref 0–6)
HCT VFR BLD CALC: 37 % — LOW (ref 39–50)
HGB BLD-MCNC: 13.4 G/DL — SIGNIFICANT CHANGE UP (ref 13–17)
IMM GRANULOCYTES NFR BLD AUTO: 0.3 % — SIGNIFICANT CHANGE UP (ref 0–1.5)
LYMPHOCYTES # BLD AUTO: 1.86 K/UL — SIGNIFICANT CHANGE UP (ref 1–3.3)
LYMPHOCYTES # BLD AUTO: 59.6 % — HIGH (ref 13–44)
MCHC RBC-ENTMCNC: 31.1 PG — SIGNIFICANT CHANGE UP (ref 27–34)
MCHC RBC-ENTMCNC: 36.2 G/DL — HIGH (ref 32–36)
MCV RBC AUTO: 85.8 FL — SIGNIFICANT CHANGE UP (ref 80–100)
MONOCYTES # BLD AUTO: 0.11 K/UL — SIGNIFICANT CHANGE UP (ref 0–0.9)
MONOCYTES NFR BLD AUTO: 3.5 % — SIGNIFICANT CHANGE UP (ref 2–14)
NEUTROPHILS # BLD AUTO: 1.11 K/UL — LOW (ref 1.8–7.4)
NEUTROPHILS NFR BLD AUTO: 35.6 % — LOW (ref 43–77)
NRBC # BLD: 0 /100 WBCS — SIGNIFICANT CHANGE UP (ref 0–0)
PLATELET # BLD AUTO: 32 K/UL — LOW (ref 150–400)
RBC # BLD: 4.31 M/UL — SIGNIFICANT CHANGE UP (ref 4.2–5.8)
RBC # FLD: 12.7 % — SIGNIFICANT CHANGE UP (ref 10.3–14.5)
WBC # BLD: 3.12 K/UL — LOW (ref 3.8–10.5)
WBC # FLD AUTO: 3.12 K/UL — LOW (ref 3.8–10.5)

## 2021-11-25 LAB
ALBUMIN SERPL ELPH-MCNC: 4.7 G/DL
ALP BLD-CCNC: 82 U/L
ALT SERPL-CCNC: 26 U/L
ANION GAP SERPL CALC-SCNC: 12 MMOL/L
AST SERPL-CCNC: 15 U/L
BILIRUB SERPL-MCNC: 0.4 MG/DL
BUN SERPL-MCNC: 15 MG/DL
CALCIUM SERPL-MCNC: 9.2 MG/DL
CHLORIDE SERPL-SCNC: 103 MMOL/L
CO2 SERPL-SCNC: 24 MMOL/L
CREAT SERPL-MCNC: 0.97 MG/DL
GLUCOSE SERPL-MCNC: 97 MG/DL
LDH SERPL-CCNC: 174 U/L
POTASSIUM SERPL-SCNC: 4.4 MMOL/L
PROT SERPL-MCNC: 7.3 G/DL
SODIUM SERPL-SCNC: 138 MMOL/L
URATE SERPL-MCNC: 5.1 MG/DL

## 2021-11-27 ENCOUNTER — RESULT REVIEW (OUTPATIENT)
Age: 36
End: 2021-11-27

## 2021-11-27 ENCOUNTER — APPOINTMENT (OUTPATIENT)
Dept: INFUSION THERAPY | Facility: HOSPITAL | Age: 36
End: 2021-11-27

## 2021-11-27 LAB
BASOPHILS # BLD AUTO: 0.01 K/UL — SIGNIFICANT CHANGE UP (ref 0–0.2)
BASOPHILS NFR BLD AUTO: 0.3 % — SIGNIFICANT CHANGE UP (ref 0–2)
EOSINOPHIL # BLD AUTO: 0.04 K/UL — SIGNIFICANT CHANGE UP (ref 0–0.5)
EOSINOPHIL NFR BLD AUTO: 1.3 % — SIGNIFICANT CHANGE UP (ref 0–6)
HCT VFR BLD CALC: 37.3 % — LOW (ref 39–50)
HGB BLD-MCNC: 13.4 G/DL — SIGNIFICANT CHANGE UP (ref 13–17)
IMM GRANULOCYTES NFR BLD AUTO: 3.7 % — HIGH (ref 0–1.5)
LYMPHOCYTES # BLD AUTO: 1.81 K/UL — SIGNIFICANT CHANGE UP (ref 1–3.3)
LYMPHOCYTES # BLD AUTO: 60.7 % — HIGH (ref 13–44)
MCHC RBC-ENTMCNC: 30.8 PG — SIGNIFICANT CHANGE UP (ref 27–34)
MCHC RBC-ENTMCNC: 35.9 G/DL — SIGNIFICANT CHANGE UP (ref 32–36)
MCV RBC AUTO: 85.7 FL — SIGNIFICANT CHANGE UP (ref 80–100)
MONOCYTES # BLD AUTO: 0.11 K/UL — SIGNIFICANT CHANGE UP (ref 0–0.9)
MONOCYTES NFR BLD AUTO: 3.7 % — SIGNIFICANT CHANGE UP (ref 2–14)
NEUTROPHILS # BLD AUTO: 0.9 K/UL — LOW (ref 1.8–7.4)
NEUTROPHILS NFR BLD AUTO: 30.3 % — LOW (ref 43–77)
NRBC # BLD: 1 /100 WBCS — HIGH (ref 0–0)
PLATELET # BLD AUTO: 17 K/UL — CRITICAL LOW (ref 150–400)
RBC # BLD: 4.35 M/UL — SIGNIFICANT CHANGE UP (ref 4.2–5.8)
RBC # FLD: 12.6 % — SIGNIFICANT CHANGE UP (ref 10.3–14.5)
WBC # BLD: 2.98 K/UL — LOW (ref 3.8–10.5)
WBC # FLD AUTO: 2.98 K/UL — LOW (ref 3.8–10.5)

## 2021-11-30 ENCOUNTER — RESULT REVIEW (OUTPATIENT)
Age: 36
End: 2021-11-30

## 2021-11-30 ENCOUNTER — APPOINTMENT (OUTPATIENT)
Dept: HEMATOLOGY ONCOLOGY | Facility: CLINIC | Age: 36
End: 2021-11-30
Payer: COMMERCIAL

## 2021-11-30 ENCOUNTER — APPOINTMENT (OUTPATIENT)
Dept: INFUSION THERAPY | Facility: HOSPITAL | Age: 36
End: 2021-11-30

## 2021-11-30 ENCOUNTER — NON-APPOINTMENT (OUTPATIENT)
Age: 36
End: 2021-11-30

## 2021-11-30 LAB
BASOPHILS # BLD AUTO: 0 K/UL — SIGNIFICANT CHANGE UP (ref 0–0.2)
BASOPHILS NFR BLD AUTO: 0 % — SIGNIFICANT CHANGE UP (ref 0–2)
EOSINOPHIL # BLD AUTO: 0.06 K/UL — SIGNIFICANT CHANGE UP (ref 0–0.5)
EOSINOPHIL NFR BLD AUTO: 2 % — SIGNIFICANT CHANGE UP (ref 0–6)
HCT VFR BLD CALC: 35.2 % — LOW (ref 39–50)
HGB BLD-MCNC: 12.7 G/DL — LOW (ref 13–17)
LYMPHOCYTES # BLD AUTO: 2.45 K/UL — SIGNIFICANT CHANGE UP (ref 1–3.3)
LYMPHOCYTES # BLD AUTO: 78 % — HIGH (ref 13–44)
MCHC RBC-ENTMCNC: 31 PG — SIGNIFICANT CHANGE UP (ref 27–34)
MCHC RBC-ENTMCNC: 36.1 G/DL — HIGH (ref 32–36)
MCV RBC AUTO: 85.9 FL — SIGNIFICANT CHANGE UP (ref 80–100)
MONOCYTES # BLD AUTO: 0.06 K/UL — SIGNIFICANT CHANGE UP (ref 0–0.9)
MONOCYTES NFR BLD AUTO: 2 % — SIGNIFICANT CHANGE UP (ref 2–14)
NEUTROPHILS # BLD AUTO: 0.57 K/UL — LOW (ref 1.8–7.4)
NEUTROPHILS NFR BLD AUTO: 18 % — LOW (ref 43–77)
NRBC # BLD: 0 /100 — SIGNIFICANT CHANGE UP (ref 0–0)
NRBC # BLD: SIGNIFICANT CHANGE UP /100 WBCS (ref 0–0)
PLAT MORPH BLD: NORMAL — SIGNIFICANT CHANGE UP
PLATELET # BLD AUTO: 6 K/UL — CRITICAL LOW (ref 150–400)
RBC # BLD: 4.1 M/UL — LOW (ref 4.2–5.8)
RBC # FLD: 12.8 % — SIGNIFICANT CHANGE UP (ref 10.3–14.5)
RBC BLD AUTO: SIGNIFICANT CHANGE UP
WBC # BLD: 3.14 K/UL — LOW (ref 3.8–10.5)
WBC # FLD AUTO: 3.14 K/UL — LOW (ref 3.8–10.5)

## 2021-11-30 PROCEDURE — 99214 OFFICE O/P EST MOD 30 MIN: CPT

## 2021-12-01 VITALS
DIASTOLIC BLOOD PRESSURE: 88 MMHG | HEART RATE: 84 BPM | RESPIRATION RATE: 18 BRPM | SYSTOLIC BLOOD PRESSURE: 135 MMHG | TEMPERATURE: 98.2 F

## 2021-12-01 DIAGNOSIS — Z51.89 ENCOUNTER FOR OTHER SPECIFIED AFTERCARE: ICD-10-CM

## 2021-12-01 DIAGNOSIS — R11.2 NAUSEA WITH VOMITING, UNSPECIFIED: ICD-10-CM

## 2021-12-01 NOTE — HISTORY OF PRESENT ILLNESS
[Disease:__________________________] : Disease: [unfilled] [Therapy: ___] : Therapy: [unfilled] [de-identified] : 35 y/o M with no PMH presented to OSH originally for c/o dizziness and feeling unwell and was then transferred to [facility] for further hematologic evaluation as patient was found to be severely pancytopenic. Patient first started noticing scattered bruising throughout his body (mostly thighs and arms) about 6 weeks prior to presentation, and also had epistaxis. Pt had also noticed intermittent fevers and chills, responsive to tylenol, and noticed very dark stools for the same time period. Also with spontaneous gum bleeding and endorsed severe fatigue and SOB with exertion. Also noted 15 lbs weight loss, partially intentional over past 2-3 months prior to presentation.  Also c/o mild headache located in posterior aspect of his head, denies sore throat, cough, +nausea but no vomiting, no abdominal pain, no diarrhea, no BRBPR, no urinary symptoms, no LE swelling, +visual changes.     \par \par Patient was transferred to [facility] from [facility] on 4/18. Patient was started on IVF, Levaquin, and allopurinol. Patient transfused blood and platelets. Bone Marrow biopsy performed on 4/20 consistent with Aplastic Anemia. 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 Because of this, patient's platelet transfusion threshold was made 50k. Horse ATG test dose was applied 4/22 with no reaction. Treatment started 4/23 consisting of equine ATG at 40mg/kg/day x 4 days, Cyclosporine 10mg/kg/day divided doses BID, Eltrombopag 150mg daily. Patient had episode of hives with ATG infusion on Day 1 thus steroid regimen was changed from oral prednisone to IV Solumedrol Q8 for the duration of ATG treatment and transitioned back to oral prednisone 1mg/kg daily for days 5-10 then tapered. The patient had grade 3 transaminitis likely from Promacta and ATG and Promacta was held as of 5/1. While patient was in the hospital, his course was also complicated by epigastric pain consistent with dyspepsia, most likely due to cyclosporine. Was managed on pepcid BID and sucralfate q6 hours and mylanta as needed with good relief. \par \par On 5/12 the patient's transaminitis had improved(grade 2 total bili, grade 1 AST, grade 2 ALT) and Promacta 150 mg po QD was resumed. Pt's cyclosporine dose was adjusted  according to the level (goal 200-400) and he was on 400 mg po q12h as of 5/18. On 5/16, patient developed chest discomfort during platelet transfusion which improved with benadryl. EKG and CXR were wnl. Patient originally refused benadryl pre-medication prior to platelets that day due to side effects with taking Benadryl, and it was decided to premedicate pt with Claritin or Zyrtec pre blood transfusion instead. On 5/20, cyclosporine level was 460, therefore dose was lowered to 300 mg PO BID. Patient's vision had improved, so platelet transfusion threshold liberalized to 20k. \par \par Patient has since had a complete recovery of blood counts. He discontinued Promacta after 6 months of therapy. He has been monitored since then wit stable CBC and therapeutic cyclosporine levels. \par \par He has since had repeat bone marrow biopsy on 5/18/2021 with normal morphology and cellularity\par  [de-identified] : - Markedly hypocellular bone marrow (mostly 5% and focal 10-\par 15%) with focal minimal erythropoiesis, minimal to absent\par myelopoiesis, absent megakaryocytes, increased iron stores. Aplastic bone marrow.  [de-identified] : Cytogenetics: \par \par Result: Male karyotype\par Karyotype: 46,XY              {12              } [de-identified] : very severe Aplastic Anemia [FreeTextEntry1] : Therapy initiated 4/23/2020 [de-identified] : Patient noticed increased petechiae on his shins so came in for bloodwork and was found with profound thrombocytopenia. He has since been found to have progressive pancytopenia and is now neutropenic. He has been receiving platelets transfusions and responding, and reports no bleeding. He denies any fevers or other constitutional symptoms at this time. No night sweats at the moment. He reports his appetite is good, he has no shortness of breath, chest pain or palpitations, and he feels energetic and otherwise not ill.

## 2021-12-01 NOTE — RESULTS/DATA
[FreeTextEntry1] : Labs on presentation:\par WBC 3.29\par Hb 3.6\par Hct 9.9\par Plt 2\par Abs Retic 6.8\par  (dropped to 70 the next day) \par \par

## 2021-12-01 NOTE — ASSESSMENT
[FreeTextEntry1] : 36 y/o M presented April 2020 with aplastic anemia (very severe AA) confirmed on bone marrow biopsy at [facility] and now s/p inpatient treatment beginning 4/23/2020 with horse ATG + CsA + promacta with complete remission. Bone marrow bx on 11/9/20 revealed hypocellular marrow, overall cellularity improved (20-50%) showing marrow regeneration. BMBx on 5/18/2021 showing normal morphology with normal cellularity. \par \par -Hematologic parameters had been back to normal, with excellent response to IST. He had no longer requiring any transfusional support. \par -s/p cyclosporine taper over 2-3 months and had stopped Promacta after 6 months, however now he has had a likely relapse as he is becoming pancytopenic and is platelet transfusion dependent again. \par -Will plan for urgent bone marrow biopsy. \par -Possible platelets three times weekly for now. \par -Discussed allogeneic bone marrow transplantation with the patient and that repeat courses of IST is also an option although with a lower response rate. Also, given recent cyclosporine taper completed 2 months ago one option can be to re-escalate cyclosporine, although that is with an uncertain endpoint. Will revisit after BMBX. \par -Patient understands and agrees with plan. All information explained to the best of my ability.\par \par

## 2021-12-01 NOTE — PHYSICAL EXAM
[Fully active, able to carry on all pre-disease performance without restriction] : Status 0 - Fully active, able to carry on all pre-disease performance without restriction [Normal] : affect appropriate [de-identified] : JEANETH [de-identified] : supple [de-identified] : (+) S1S2 RRR [de-identified] : no edema (+)pp [de-identified] : no tenderness [de-identified] : ROM intact [de-identified] : no obvious petechiae - some mild on anterior lower shin [de-identified] : A&Ox3

## 2021-12-02 ENCOUNTER — LABORATORY RESULT (OUTPATIENT)
Age: 36
End: 2021-12-02

## 2021-12-02 ENCOUNTER — RESULT REVIEW (OUTPATIENT)
Age: 36
End: 2021-12-02

## 2021-12-02 ENCOUNTER — APPOINTMENT (OUTPATIENT)
Dept: HEMATOLOGY ONCOLOGY | Facility: CLINIC | Age: 36
End: 2021-12-02
Payer: COMMERCIAL

## 2021-12-02 VITALS
HEIGHT: 68.19 IN | TEMPERATURE: 97.4 F | HEART RATE: 101 BPM | BODY MASS INDEX: 34.95 KG/M2 | SYSTOLIC BLOOD PRESSURE: 132 MMHG | OXYGEN SATURATION: 98 % | DIASTOLIC BLOOD PRESSURE: 90 MMHG | WEIGHT: 230.6 LBS | RESPIRATION RATE: 16 BRPM

## 2021-12-02 LAB
BASOPHILS # BLD AUTO: 0 K/UL — SIGNIFICANT CHANGE UP (ref 0–0.2)
BASOPHILS NFR BLD AUTO: 0 % — SIGNIFICANT CHANGE UP (ref 0–2)
EOSINOPHIL # BLD AUTO: 0.03 K/UL — SIGNIFICANT CHANGE UP (ref 0–0.5)
EOSINOPHIL NFR BLD AUTO: 1 % — SIGNIFICANT CHANGE UP (ref 0–6)
HCT VFR BLD CALC: 35.4 % — LOW (ref 39–50)
HGB BLD-MCNC: 12.9 G/DL — LOW (ref 13–17)
LYMPHOCYTES # BLD AUTO: 2.75 K/UL — SIGNIFICANT CHANGE UP (ref 1–3.3)
LYMPHOCYTES # BLD AUTO: 81 % — HIGH (ref 13–44)
MCHC RBC-ENTMCNC: 30.9 PG — SIGNIFICANT CHANGE UP (ref 27–34)
MCHC RBC-ENTMCNC: 36.4 G/DL — HIGH (ref 32–36)
MCV RBC AUTO: 84.7 FL — SIGNIFICANT CHANGE UP (ref 80–100)
MONOCYTES # BLD AUTO: 0.17 K/UL — SIGNIFICANT CHANGE UP (ref 0–0.9)
MONOCYTES NFR BLD AUTO: 5 % — SIGNIFICANT CHANGE UP (ref 2–14)
NEUTROPHILS # BLD AUTO: 0.44 K/UL — LOW (ref 1.8–7.4)
NEUTROPHILS NFR BLD AUTO: 13 % — LOW (ref 43–77)
NRBC # BLD: 0 /100 — SIGNIFICANT CHANGE UP (ref 0–0)
NRBC # BLD: SIGNIFICANT CHANGE UP /100 WBCS (ref 0–0)
PLAT MORPH BLD: NORMAL — SIGNIFICANT CHANGE UP
PLATELET # BLD AUTO: 29 K/UL — LOW (ref 150–400)
RBC # BLD: 4.18 M/UL — LOW (ref 4.2–5.8)
RBC # FLD: 12.8 % — SIGNIFICANT CHANGE UP (ref 10.3–14.5)
RBC BLD AUTO: SIGNIFICANT CHANGE UP
WBC # BLD: 3.39 K/UL — LOW (ref 3.8–10.5)
WBC # FLD AUTO: 3.39 K/UL — LOW (ref 3.8–10.5)

## 2021-12-02 PROCEDURE — 38222 DX BONE MARROW BX & ASPIR: CPT | Mod: LT

## 2021-12-02 RX ORDER — CYCLOSPORINE 100 MG/ML
100 SOLUTION ORAL
Qty: 150 | Refills: 2 | Status: DISCONTINUED | COMMUNITY
Start: 2020-06-15 | End: 2021-12-02

## 2021-12-02 NOTE — REASON FOR VISIT
[Bone Marrow Biopsy] : bone marrow biopsy [Bone Marrow Aspiration] : bone marrow aspiration [FreeTextEntry2] : 35 yo male dxed w/ aplastic anemia s/p tx w/ horse ATG+CSA+promacta now with profound thrombocytopenia and neutropenia likely relapsed disease

## 2021-12-02 NOTE — PROCEDURE
[Bone Marrow Biopsy] : bone marrow biopsy [Bone Marrow Aspiration] : bone marrow aspiration  [Patient] : the patient [Verbal Consent Obtained] : verbal consent was obtained prior to the procedure [Patient identification verified] : patient identification verified [Procedure verified and consent obtained] : procedure verified and consent obtained [Laterality verified and correct site marked] : laterality verified and correct site marked [Correct positioning] : correct positioning [Prone] : prone [Superior iliac spine was identified] : the superior iliac spine was identified. [Lidocaine was injected and into the periosteum overlying the site.] : Lidocaine was injected and into the periosteum overlying the site. [Aspirate] : aspirate [Cytogenetics] : cytogenetics [FISH] : FISH [Biopsy] : biopsy [Flow Cytometry] : flow cytometry [] : The patient was instructed to remove the bandage the following AM. The patient may bathe. Acetaminophen may be taken for discomfort, as per package directions.If there are any other problems, the patient was instructed to call the office. The patient verbalized understanding, and is aware of the office contact numbers. [Left] : site: left [The left posterior iliac crest was prepped with betadine and draped, using sterile technique.] : The left posterior iliac crest was prepped with betadine and draped, using sterile technique. [FreeTextEntry1] : 35 yo male dxed w/ aplastic anemia s/p tx w/ horse ATG+CSA+promacta now with profound thrombocytopenia and neutropenia likely relapsed disease [FreeTextEntry2] : WBC: 3.39\par Hgb: 12.9\par PLT: 29K\par \par Bone marrow biopsy and aspiration completed. Patient completed without difficulty.

## 2021-12-03 ENCOUNTER — RESULT REVIEW (OUTPATIENT)
Age: 36
End: 2021-12-03

## 2021-12-03 ENCOUNTER — APPOINTMENT (OUTPATIENT)
Dept: INFUSION THERAPY | Facility: HOSPITAL | Age: 36
End: 2021-12-03

## 2021-12-03 LAB
BASOPHILS # BLD AUTO: 0 K/UL — SIGNIFICANT CHANGE UP (ref 0–0.2)
BASOPHILS NFR BLD AUTO: 0 % — SIGNIFICANT CHANGE UP (ref 0–2)
EOSINOPHIL # BLD AUTO: 0.02 K/UL — SIGNIFICANT CHANGE UP (ref 0–0.5)
EOSINOPHIL NFR BLD AUTO: 0.8 % — SIGNIFICANT CHANGE UP (ref 0–6)
HCT VFR BLD CALC: 33.1 % — LOW (ref 39–50)
HGB BLD-MCNC: 12.3 G/DL — LOW (ref 13–17)
IMM GRANULOCYTES NFR BLD AUTO: 0 % — SIGNIFICANT CHANGE UP (ref 0–1.5)
LYMPHOCYTES # BLD AUTO: 2.05 K/UL — SIGNIFICANT CHANGE UP (ref 1–3.3)
LYMPHOCYTES # BLD AUTO: 80.1 % — HIGH (ref 13–44)
MCHC RBC-ENTMCNC: 31.6 PG — SIGNIFICANT CHANGE UP (ref 27–34)
MCHC RBC-ENTMCNC: 37.2 G/DL — HIGH (ref 32–36)
MCV RBC AUTO: 85.1 FL — SIGNIFICANT CHANGE UP (ref 80–100)
MONOCYTES # BLD AUTO: 0.11 K/UL — SIGNIFICANT CHANGE UP (ref 0–0.9)
MONOCYTES NFR BLD AUTO: 4.3 % — SIGNIFICANT CHANGE UP (ref 2–14)
NEUTROPHILS # BLD AUTO: 0.38 K/UL — LOW (ref 1.8–7.4)
NEUTROPHILS NFR BLD AUTO: 14.8 % — LOW (ref 43–77)
NRBC # BLD: 0 /100 WBCS — SIGNIFICANT CHANGE UP (ref 0–0)
PLATELET # BLD AUTO: 22 K/UL — LOW (ref 150–400)
RBC # BLD: 3.89 M/UL — LOW (ref 4.2–5.8)
RBC # FLD: 12.7 % — SIGNIFICANT CHANGE UP (ref 10.3–14.5)
WBC # BLD: 2.56 K/UL — LOW (ref 3.8–10.5)
WBC # FLD AUTO: 2.56 K/UL — LOW (ref 3.8–10.5)

## 2021-12-07 ENCOUNTER — NON-APPOINTMENT (OUTPATIENT)
Age: 36
End: 2021-12-07

## 2021-12-07 ENCOUNTER — RESULT REVIEW (OUTPATIENT)
Age: 36
End: 2021-12-07

## 2021-12-07 ENCOUNTER — APPOINTMENT (OUTPATIENT)
Dept: INFUSION THERAPY | Facility: HOSPITAL | Age: 36
End: 2021-12-07

## 2021-12-07 LAB
BASOPHILS # BLD AUTO: 0 K/UL — SIGNIFICANT CHANGE UP (ref 0–0.2)
BASOPHILS NFR BLD AUTO: 0 % — SIGNIFICANT CHANGE UP (ref 0–2)
EOSINOPHIL # BLD AUTO: 0 K/UL — SIGNIFICANT CHANGE UP (ref 0–0.5)
EOSINOPHIL NFR BLD AUTO: 0 % — SIGNIFICANT CHANGE UP (ref 0–6)
HCT VFR BLD CALC: 32.8 % — LOW (ref 39–50)
HGB BLD-MCNC: 12.1 G/DL — LOW (ref 13–17)
LYMPHOCYTES # BLD AUTO: 2.49 K/UL — SIGNIFICANT CHANGE UP (ref 1–3.3)
LYMPHOCYTES # BLD AUTO: 89 % — HIGH (ref 13–44)
MCHC RBC-ENTMCNC: 30.9 PG — SIGNIFICANT CHANGE UP (ref 27–34)
MCHC RBC-ENTMCNC: 36.9 G/DL — HIGH (ref 32–36)
MCV RBC AUTO: 83.9 FL — SIGNIFICANT CHANGE UP (ref 80–100)
MONOCYTES # BLD AUTO: 0.06 K/UL — SIGNIFICANT CHANGE UP (ref 0–0.9)
MONOCYTES NFR BLD AUTO: 2 % — SIGNIFICANT CHANGE UP (ref 2–14)
NEUTROPHILS # BLD AUTO: 0.25 K/UL — LOW (ref 1.8–7.4)
NEUTROPHILS NFR BLD AUTO: 9 % — LOW (ref 43–77)
NRBC # BLD: 0 /100 — SIGNIFICANT CHANGE UP (ref 0–0)
NRBC # BLD: SIGNIFICANT CHANGE UP /100 WBCS (ref 0–0)
PLAT MORPH BLD: NORMAL — SIGNIFICANT CHANGE UP
PLATELET # BLD AUTO: 6 K/UL — CRITICAL LOW (ref 150–400)
RBC # BLD: 3.91 M/UL — LOW (ref 4.2–5.8)
RBC # FLD: 12.6 % — SIGNIFICANT CHANGE UP (ref 10.3–14.5)
RBC BLD AUTO: SIGNIFICANT CHANGE UP
WBC # BLD: 2.8 K/UL — LOW (ref 3.8–10.5)
WBC # FLD AUTO: 2.8 K/UL — LOW (ref 3.8–10.5)

## 2021-12-08 ENCOUNTER — NON-APPOINTMENT (OUTPATIENT)
Age: 36
End: 2021-12-08

## 2021-12-08 RX ORDER — CYCLOSPORINE 100 MG/1
100 CAPSULE, GELATIN COATED ORAL
Qty: 180 | Refills: 0 | Status: DISCONTINUED | COMMUNITY
Start: 2021-12-08 | End: 2021-12-08

## 2021-12-09 ENCOUNTER — NON-APPOINTMENT (OUTPATIENT)
Age: 36
End: 2021-12-09

## 2021-12-10 ENCOUNTER — RESULT REVIEW (OUTPATIENT)
Age: 36
End: 2021-12-10

## 2021-12-10 ENCOUNTER — APPOINTMENT (OUTPATIENT)
Dept: HEMATOLOGY ONCOLOGY | Facility: CLINIC | Age: 36
End: 2021-12-10

## 2021-12-10 ENCOUNTER — NON-APPOINTMENT (OUTPATIENT)
Age: 36
End: 2021-12-10

## 2021-12-10 ENCOUNTER — APPOINTMENT (OUTPATIENT)
Dept: INFUSION THERAPY | Facility: HOSPITAL | Age: 36
End: 2021-12-10

## 2021-12-10 LAB
BASOPHILS # BLD AUTO: 0 K/UL — SIGNIFICANT CHANGE UP (ref 0–0.2)
BASOPHILS NFR BLD AUTO: 0 % — SIGNIFICANT CHANGE UP (ref 0–2)
EOSINOPHIL # BLD AUTO: 0 K/UL — SIGNIFICANT CHANGE UP (ref 0–0.5)
EOSINOPHIL NFR BLD AUTO: 0 % — SIGNIFICANT CHANGE UP (ref 0–6)
HCT VFR BLD CALC: 31.4 % — LOW (ref 39–50)
HGB BLD-MCNC: 11.6 G/DL — LOW (ref 13–17)
LYMPHOCYTES # BLD AUTO: 2.59 K/UL — SIGNIFICANT CHANGE UP (ref 1–3.3)
LYMPHOCYTES # BLD AUTO: 92 % — HIGH (ref 13–44)
MCHC RBC-ENTMCNC: 30.9 PG — SIGNIFICANT CHANGE UP (ref 27–34)
MCHC RBC-ENTMCNC: 36.9 G/DL — HIGH (ref 32–36)
MCV RBC AUTO: 83.7 FL — SIGNIFICANT CHANGE UP (ref 80–100)
MONOCYTES # BLD AUTO: 0.08 K/UL — SIGNIFICANT CHANGE UP (ref 0–0.9)
MONOCYTES NFR BLD AUTO: 3 % — SIGNIFICANT CHANGE UP (ref 2–14)
NEUTROPHILS # BLD AUTO: 0.14 K/UL — LOW (ref 1.8–7.4)
NEUTROPHILS NFR BLD AUTO: 5 % — LOW (ref 43–77)
NRBC # BLD: 0 /100 — SIGNIFICANT CHANGE UP (ref 0–0)
NRBC # BLD: SIGNIFICANT CHANGE UP /100 WBCS (ref 0–0)
PLAT MORPH BLD: NORMAL — SIGNIFICANT CHANGE UP
PLATELET # BLD AUTO: 20 K/UL — CRITICAL LOW (ref 150–400)
RBC # BLD: 3.75 M/UL — LOW (ref 4.2–5.8)
RBC # FLD: 12.6 % — SIGNIFICANT CHANGE UP (ref 10.3–14.5)
RBC BLD AUTO: SIGNIFICANT CHANGE UP
WBC # BLD: 2.81 K/UL — LOW (ref 3.8–10.5)
WBC # FLD AUTO: 2.81 K/UL — LOW (ref 3.8–10.5)

## 2021-12-14 ENCOUNTER — RESULT REVIEW (OUTPATIENT)
Age: 36
End: 2021-12-14

## 2021-12-14 ENCOUNTER — NON-APPOINTMENT (OUTPATIENT)
Age: 36
End: 2021-12-14

## 2021-12-14 ENCOUNTER — APPOINTMENT (OUTPATIENT)
Dept: HEMATOLOGY ONCOLOGY | Facility: CLINIC | Age: 36
End: 2021-12-14

## 2021-12-14 ENCOUNTER — APPOINTMENT (OUTPATIENT)
Dept: INFUSION THERAPY | Facility: HOSPITAL | Age: 36
End: 2021-12-14

## 2021-12-14 LAB
BASOPHILS # BLD AUTO: 0 K/UL — SIGNIFICANT CHANGE UP (ref 0–0.2)
BASOPHILS NFR BLD AUTO: 0 % — SIGNIFICANT CHANGE UP (ref 0–2)
EOSINOPHIL # BLD AUTO: 0 K/UL — SIGNIFICANT CHANGE UP (ref 0–0.5)
EOSINOPHIL NFR BLD AUTO: 0 % — SIGNIFICANT CHANGE UP (ref 0–6)
HCT VFR BLD CALC: 29.5 % — LOW (ref 39–50)
HGB BLD-MCNC: 11.2 G/DL — LOW (ref 13–17)
LYMPHOCYTES # BLD AUTO: 3.05 K/UL — SIGNIFICANT CHANGE UP (ref 1–3.3)
LYMPHOCYTES # BLD AUTO: 91 % — HIGH (ref 13–44)
MCHC RBC-ENTMCNC: 31.5 PG — SIGNIFICANT CHANGE UP (ref 27–34)
MCHC RBC-ENTMCNC: 38 G/DL — HIGH (ref 32–36)
MCV RBC AUTO: 82.9 FL — SIGNIFICANT CHANGE UP (ref 80–100)
MONOCYTES # BLD AUTO: 0.07 K/UL — SIGNIFICANT CHANGE UP (ref 0–0.9)
MONOCYTES NFR BLD AUTO: 2 % — SIGNIFICANT CHANGE UP (ref 2–14)
NEUTROPHILS # BLD AUTO: 0.23 K/UL — LOW (ref 1.8–7.4)
NEUTROPHILS NFR BLD AUTO: 7 % — LOW (ref 43–77)
NRBC # BLD: 0 /100 — SIGNIFICANT CHANGE UP (ref 0–0)
NRBC # BLD: SIGNIFICANT CHANGE UP /100 WBCS (ref 0–0)
PLAT MORPH BLD: NORMAL — SIGNIFICANT CHANGE UP
PLATELET # BLD AUTO: 5 K/UL — CRITICAL LOW (ref 150–400)
RBC # BLD: 3.56 M/UL — LOW (ref 4.2–5.8)
RBC # FLD: 12.3 % — SIGNIFICANT CHANGE UP (ref 10.3–14.5)
RBC BLD AUTO: SIGNIFICANT CHANGE UP
WBC # BLD: 3.35 K/UL — LOW (ref 3.8–10.5)
WBC # FLD AUTO: 3.35 K/UL — LOW (ref 3.8–10.5)

## 2021-12-17 ENCOUNTER — RESULT REVIEW (OUTPATIENT)
Age: 36
End: 2021-12-17

## 2021-12-17 ENCOUNTER — APPOINTMENT (OUTPATIENT)
Dept: INFUSION THERAPY | Facility: HOSPITAL | Age: 36
End: 2021-12-17

## 2021-12-17 LAB
BASOPHILS # BLD AUTO: 0 K/UL — SIGNIFICANT CHANGE UP (ref 0–0.2)
BASOPHILS NFR BLD AUTO: 0 % — SIGNIFICANT CHANGE UP (ref 0–2)
EOSINOPHIL # BLD AUTO: 0.03 K/UL — SIGNIFICANT CHANGE UP (ref 0–0.5)
EOSINOPHIL NFR BLD AUTO: 1 % — SIGNIFICANT CHANGE UP (ref 0–6)
HCT VFR BLD CALC: 29.8 % — LOW (ref 39–50)
HGB BLD-MCNC: 10.9 G/DL — LOW (ref 13–17)
LYMPHOCYTES # BLD AUTO: 2.34 K/UL — SIGNIFICANT CHANGE UP (ref 1–3.3)
LYMPHOCYTES # BLD AUTO: 85 % — HIGH (ref 13–44)
MCHC RBC-ENTMCNC: 30 PG — SIGNIFICANT CHANGE UP (ref 27–34)
MCHC RBC-ENTMCNC: 36.6 G/DL — HIGH (ref 32–36)
MCV RBC AUTO: 82.1 FL — SIGNIFICANT CHANGE UP (ref 80–100)
MONOCYTES # BLD AUTO: 0.14 K/UL — SIGNIFICANT CHANGE UP (ref 0–0.9)
MONOCYTES NFR BLD AUTO: 5 % — SIGNIFICANT CHANGE UP (ref 2–14)
NEUTROPHILS # BLD AUTO: 0.17 K/UL — LOW (ref 1.8–7.4)
NEUTROPHILS NFR BLD AUTO: 6 % — LOW (ref 43–77)
NRBC # BLD: 0 /100 — SIGNIFICANT CHANGE UP (ref 0–0)
NRBC # BLD: SIGNIFICANT CHANGE UP /100 WBCS (ref 0–0)
PLAT MORPH BLD: NORMAL — SIGNIFICANT CHANGE UP
PLATELET # BLD AUTO: 24 K/UL — LOW (ref 150–400)
RBC # BLD: 3.63 M/UL — LOW (ref 4.2–5.8)
RBC # FLD: 12.4 % — SIGNIFICANT CHANGE UP (ref 10.3–14.5)
RBC BLD AUTO: SIGNIFICANT CHANGE UP
VARIANT LYMPHS # BLD: 3 % — SIGNIFICANT CHANGE UP (ref 0–6)
WBC # BLD: 2.75 K/UL — LOW (ref 3.8–10.5)
WBC # FLD AUTO: 2.75 K/UL — LOW (ref 3.8–10.5)

## 2021-12-21 ENCOUNTER — APPOINTMENT (OUTPATIENT)
Dept: INFUSION THERAPY | Facility: HOSPITAL | Age: 36
End: 2021-12-21

## 2021-12-21 ENCOUNTER — INPATIENT (INPATIENT)
Facility: HOSPITAL | Age: 36
LOS: 29 days | Discharge: HOME HEALTH | DRG: 809 | End: 2022-01-20
Attending: INTERNAL MEDICINE | Admitting: HOSPITALIST
Payer: COMMERCIAL

## 2021-12-21 ENCOUNTER — APPOINTMENT (OUTPATIENT)
Dept: HEMATOLOGY ONCOLOGY | Facility: CLINIC | Age: 36
End: 2021-12-21

## 2021-12-21 VITALS
WEIGHT: 227.96 LBS | HEART RATE: 118 BPM | OXYGEN SATURATION: 99 % | HEIGHT: 69 IN | TEMPERATURE: 98 F | RESPIRATION RATE: 18 BRPM | SYSTOLIC BLOOD PRESSURE: 137 MMHG | DIASTOLIC BLOOD PRESSURE: 93 MMHG

## 2021-12-21 DIAGNOSIS — K08.89 OTHER SPECIFIED DISORDERS OF TEETH AND SUPPORTING STRUCTURES: ICD-10-CM

## 2021-12-21 DIAGNOSIS — D61.818 OTHER PANCYTOPENIA: ICD-10-CM

## 2021-12-21 DIAGNOSIS — D70.9 NEUTROPENIA, UNSPECIFIED: ICD-10-CM

## 2021-12-21 DIAGNOSIS — R50.9 FEVER, UNSPECIFIED: ICD-10-CM

## 2021-12-21 DIAGNOSIS — Z29.9 ENCOUNTER FOR PROPHYLACTIC MEASURES, UNSPECIFIED: ICD-10-CM

## 2021-12-21 DIAGNOSIS — Z98.890 OTHER SPECIFIED POSTPROCEDURAL STATES: Chronic | ICD-10-CM

## 2021-12-21 DIAGNOSIS — D61.9 APLASTIC ANEMIA, UNSPECIFIED: ICD-10-CM

## 2021-12-21 DIAGNOSIS — R04.0 EPISTAXIS: ICD-10-CM

## 2021-12-21 LAB
ALBUMIN SERPL ELPH-MCNC: 4.5 G/DL — SIGNIFICANT CHANGE UP (ref 3.3–5)
ALP SERPL-CCNC: 87 U/L — SIGNIFICANT CHANGE UP (ref 40–120)
ALT FLD-CCNC: 37 U/L — SIGNIFICANT CHANGE UP (ref 10–45)
ANION GAP SERPL CALC-SCNC: 14 MMOL/L — SIGNIFICANT CHANGE UP (ref 5–17)
APPEARANCE UR: CLEAR — SIGNIFICANT CHANGE UP
APTT BLD: 34.3 SEC — SIGNIFICANT CHANGE UP (ref 27.5–35.5)
AST SERPL-CCNC: 17 U/L — SIGNIFICANT CHANGE UP (ref 10–40)
BACTERIA # UR AUTO: NEGATIVE — SIGNIFICANT CHANGE UP
BASOPHILS # BLD AUTO: 0 K/UL — SIGNIFICANT CHANGE UP (ref 0–0.2)
BASOPHILS NFR BLD AUTO: 0 % — SIGNIFICANT CHANGE UP (ref 0–2)
BILIRUB SERPL-MCNC: 0.9 MG/DL — SIGNIFICANT CHANGE UP (ref 0.2–1.2)
BILIRUB UR-MCNC: NEGATIVE — SIGNIFICANT CHANGE UP
BLD GP AB SCN SERPL QL: NEGATIVE — SIGNIFICANT CHANGE UP
BUN SERPL-MCNC: 22 MG/DL — SIGNIFICANT CHANGE UP (ref 7–23)
CALCIUM SERPL-MCNC: 9.3 MG/DL — SIGNIFICANT CHANGE UP (ref 8.4–10.5)
CHLORIDE SERPL-SCNC: 103 MMOL/L — SIGNIFICANT CHANGE UP (ref 96–108)
CO2 SERPL-SCNC: 21 MMOL/L — LOW (ref 22–31)
COLOR SPEC: YELLOW — SIGNIFICANT CHANGE UP
COMMENT - URINE: SIGNIFICANT CHANGE UP
CREAT SERPL-MCNC: 1.18 MG/DL — SIGNIFICANT CHANGE UP (ref 0.5–1.3)
CYCLOSPORINE SER-MCNC: 316 NG/ML — SIGNIFICANT CHANGE UP (ref 150–400)
DIFF PNL FLD: NEGATIVE — SIGNIFICANT CHANGE UP
EOSINOPHIL # BLD AUTO: 0 K/UL — SIGNIFICANT CHANGE UP (ref 0–0.5)
EOSINOPHIL NFR BLD AUTO: 0 % — SIGNIFICANT CHANGE UP (ref 0–6)
EPI CELLS # UR: 0 /HPF — SIGNIFICANT CHANGE UP
GLUCOSE SERPL-MCNC: 101 MG/DL — HIGH (ref 70–99)
GLUCOSE UR QL: NEGATIVE — SIGNIFICANT CHANGE UP
HCT VFR BLD CALC: 24.8 % — LOW (ref 39–50)
HCT VFR BLD CALC: 27.5 % — LOW (ref 39–50)
HGB BLD-MCNC: 10.1 G/DL — LOW (ref 13–17)
HGB BLD-MCNC: 9 G/DL — LOW (ref 13–17)
HYALINE CASTS # UR AUTO: 4 /LPF — HIGH (ref 0–2)
INR BLD: 1.18 RATIO — HIGH (ref 0.88–1.16)
KETONES UR-MCNC: NEGATIVE — SIGNIFICANT CHANGE UP
LEUKOCYTE ESTERASE UR-ACNC: NEGATIVE — SIGNIFICANT CHANGE UP
LYMPHOCYTES # BLD AUTO: 2.08 K/UL — SIGNIFICANT CHANGE UP (ref 1–3.3)
LYMPHOCYTES # BLD AUTO: 84.7 % — HIGH (ref 13–44)
MCHC RBC-ENTMCNC: 30.1 PG — SIGNIFICANT CHANGE UP (ref 27–34)
MCHC RBC-ENTMCNC: 30.9 PG — SIGNIFICANT CHANGE UP (ref 27–34)
MCHC RBC-ENTMCNC: 36.3 GM/DL — HIGH (ref 32–36)
MCHC RBC-ENTMCNC: 36.7 GM/DL — HIGH (ref 32–36)
MCV RBC AUTO: 82.9 FL — SIGNIFICANT CHANGE UP (ref 80–100)
MCV RBC AUTO: 84.1 FL — SIGNIFICANT CHANGE UP (ref 80–100)
MONOCYTES # BLD AUTO: 0.07 K/UL — SIGNIFICANT CHANGE UP (ref 0–0.9)
MONOCYTES NFR BLD AUTO: 2.7 % — SIGNIFICANT CHANGE UP (ref 2–14)
NEUTROPHILS # BLD AUTO: 0.31 K/UL — LOW (ref 1.8–7.4)
NEUTROPHILS NFR BLD AUTO: 11.7 % — LOW (ref 43–77)
NITRITE UR-MCNC: NEGATIVE — SIGNIFICANT CHANGE UP
NRBC # BLD: 0 /100 WBCS — SIGNIFICANT CHANGE UP (ref 0–0)
PH UR: 6 — SIGNIFICANT CHANGE UP (ref 5–8)
PLATELET # BLD AUTO: 4 K/UL — CRITICAL LOW (ref 150–400)
PLATELET # BLD AUTO: 42 K/UL — LOW (ref 150–400)
POTASSIUM SERPL-MCNC: 4.4 MMOL/L — SIGNIFICANT CHANGE UP (ref 3.5–5.3)
POTASSIUM SERPL-SCNC: 4.4 MMOL/L — SIGNIFICANT CHANGE UP (ref 3.5–5.3)
PROT SERPL-MCNC: 7.6 G/DL — SIGNIFICANT CHANGE UP (ref 6–8.3)
PROT UR-MCNC: ABNORMAL
PROTHROM AB SERPL-ACNC: 14.1 SEC — HIGH (ref 10.6–13.6)
RAPID RVP RESULT: SIGNIFICANT CHANGE UP
RBC # BLD: 2.99 M/UL — LOW (ref 4.2–5.8)
RBC # BLD: 3.27 M/UL — LOW (ref 4.2–5.8)
RBC # FLD: 12.3 % — SIGNIFICANT CHANGE UP (ref 10.3–14.5)
RBC # FLD: 12.4 % — SIGNIFICANT CHANGE UP (ref 10.3–14.5)
RBC CASTS # UR COMP ASSIST: 2 /HPF — SIGNIFICANT CHANGE UP (ref 0–4)
RH IG SCN BLD-IMP: POSITIVE — SIGNIFICANT CHANGE UP
SARS-COV-2 RNA SPEC QL NAA+PROBE: SIGNIFICANT CHANGE UP
SODIUM SERPL-SCNC: 138 MMOL/L — SIGNIFICANT CHANGE UP (ref 135–145)
SP GR SPEC: 1.03 — HIGH (ref 1.01–1.02)
UROBILINOGEN FLD QL: NEGATIVE — SIGNIFICANT CHANGE UP
WBC # BLD: 2.08 K/UL — LOW (ref 3.8–10.5)
WBC # BLD: 2.46 K/UL — LOW (ref 3.8–10.5)
WBC # FLD AUTO: 2.08 K/UL — LOW (ref 3.8–10.5)
WBC # FLD AUTO: 2.46 K/UL — LOW (ref 3.8–10.5)
WBC UR QL: 1 /HPF — SIGNIFICANT CHANGE UP (ref 0–5)

## 2021-12-21 PROCEDURE — 71045 X-RAY EXAM CHEST 1 VIEW: CPT | Mod: 26

## 2021-12-21 PROCEDURE — 93010 ELECTROCARDIOGRAM REPORT: CPT

## 2021-12-21 PROCEDURE — 99285 EMERGENCY DEPT VISIT HI MDM: CPT

## 2021-12-21 PROCEDURE — 99223 1ST HOSP IP/OBS HIGH 75: CPT

## 2021-12-21 RX ORDER — CEFEPIME 1 G/1
2000 INJECTION, POWDER, FOR SOLUTION INTRAMUSCULAR; INTRAVENOUS ONCE
Refills: 0 | Status: COMPLETED | OUTPATIENT
Start: 2021-12-21 | End: 2021-12-21

## 2021-12-21 RX ORDER — DIPHENHYDRAMINE HCL 50 MG
25 CAPSULE ORAL ONCE
Refills: 0 | Status: COMPLETED | OUTPATIENT
Start: 2021-12-21 | End: 2021-12-21

## 2021-12-21 RX ORDER — FLUCONAZOLE 150 MG/1
200 TABLET ORAL DAILY
Refills: 0 | Status: DISCONTINUED | OUTPATIENT
Start: 2021-12-22 | End: 2021-12-22

## 2021-12-21 RX ORDER — FAMOTIDINE 10 MG/ML
20 INJECTION INTRAVENOUS
Refills: 0 | Status: DISCONTINUED | OUTPATIENT
Start: 2021-12-21 | End: 2021-12-29

## 2021-12-21 RX ORDER — CETIRIZINE HYDROCHLORIDE 10 MG/1
1 TABLET ORAL
Qty: 0 | Refills: 0 | DISCHARGE

## 2021-12-21 RX ORDER — ACYCLOVIR SODIUM 500 MG
400 VIAL (EA) INTRAVENOUS EVERY 8 HOURS
Refills: 0 | Status: DISCONTINUED | OUTPATIENT
Start: 2021-12-22 | End: 2021-12-22

## 2021-12-21 RX ORDER — ACETAMINOPHEN 500 MG
650 TABLET ORAL ONCE
Refills: 0 | Status: COMPLETED | OUTPATIENT
Start: 2021-12-21 | End: 2021-12-21

## 2021-12-21 RX ORDER — CEFEPIME 1 G/1
2000 INJECTION, POWDER, FOR SOLUTION INTRAMUSCULAR; INTRAVENOUS EVERY 8 HOURS
Refills: 0 | Status: DISCONTINUED | OUTPATIENT
Start: 2021-12-22 | End: 2022-01-09

## 2021-12-21 RX ADMIN — Medication 25 MILLIGRAM(S): at 22:37

## 2021-12-21 RX ADMIN — CEFEPIME 100 MILLIGRAM(S): 1 INJECTION, POWDER, FOR SOLUTION INTRAMUSCULAR; INTRAVENOUS at 16:41

## 2021-12-21 RX ADMIN — CEFEPIME 100 MILLIGRAM(S): 1 INJECTION, POWDER, FOR SOLUTION INTRAMUSCULAR; INTRAVENOUS at 23:13

## 2021-12-21 RX ADMIN — Medication 25 MILLIGRAM(S): at 18:23

## 2021-12-21 RX ADMIN — Medication 650 MILLIGRAM(S): at 22:36

## 2021-12-21 RX ADMIN — Medication 650 MILLIGRAM(S): at 16:40

## 2021-12-21 RX ADMIN — Medication 650 MILLIGRAM(S): at 19:05

## 2021-12-21 NOTE — ED PROVIDER NOTE - NS ED ROS FT
Constitutional:  (-) fever, (-) chills, (-) fatigue  Eyes:  (-) eye pain (-) visual changes  ENMT: (+) nose bleed, (-) sore throat. (-) neck pain or stiffness  Cardiac: (-) chest pain (-) palpitations  Respiratory:  (-) cough (-) shortness of breath  GI:  (-) nausea (-) vomiting (-) diarrhea (-) abdominal pain  :  (-) dysuria (-) frequency (-) burning  MS:  (-) back pain (-) joint pain  Neuro:  (-) headache (-) numbness (-) tingling (-) focal weakness  Skin:  (-) rash  Except as documented in the HPI,  all other systems are negative

## 2021-12-21 NOTE — H&P ADULT - PROBLEM SELECTOR PLAN 1
- ANC of 380  - possible infection: dental abscess vs viral meningitis vs other etiology  - awaiting blood culture, urine culture, UA   - Ordered MRSA /MSSA PCR  - Ordered Cefepime 2 g TID   - If pt develops fever, will consult ID for further management

## 2021-12-21 NOTE — H&P ADULT - ATTENDING COMMENTS
AGree with above. Patient presents with one day of fever in setting of neutropenia. physical exam as above. Labs reviewed, pancytopenia witn ANC<500.   CXR personally reviewed, no consolidation pattern.  Telemetry reviewed, occasional PVC    #Neutropenic fevers  -continue cefepime.  -no evidence or mucositis, rash can hold off vancomycin for now  -follow up bloodcultures  -RVP negative  -dental consult for possible tooth as source  -trend ANC    #Nosebleed  -minimal at this time.  -if worsens will call ENT for packing.  -plt transfusion.    #aplastic anemia  -hold cyclosporine.  -erick does not have promata 2/2 insurance issues. Will inform hematology to see if can get approval.    rest as above

## 2021-12-21 NOTE — H&P ADULT - PROBLEM SELECTOR PLAN 6
- Diet: regular   - No DVT prophylaxis given low platelet, low risk for thrombosis and patient ambulatory   - c/w Pepcid for ulcer prophylaxis

## 2021-12-21 NOTE — PATIENT PROFILE ADULT - FALL HARM RISK - HARM RISK INTERVENTIONS

## 2021-12-21 NOTE — ED ADULT NURSE NOTE - OBJECTIVE STATEMENT
36y male arrived to ED complaining of headache, fever and epistaxis. Patient PMHx Redundant prepuce phimosis, asthma. Patient reports onset of symptoms last night. Reports fever at home of 103. Took Tylenol prior to arrival. Patient endorses headache, neck stiffness, L nare bloody nose. Denies CP, SOB, n/v/d, abd pain. Patient not in distress. A&Ox4, ambulatory, VS stable.

## 2021-12-21 NOTE — CHART NOTE - NSCHARTNOTEFT_GEN_A_CORE
Hematology aware patient is in the ED. Will need some work up before we see the patient.    35 year old man presented April 2020 with aplastic anemia (very severe AA) confirmed on bone marrow biopsy at [facility] and now s/p inpatient treatment beginning 4/23/2020 with horse ATG + CsA + promacta with complete remission. Bone marrow bx on 11/9/20 revealed hypocellular marrow, overall cellularity improved (20-50%) showing marrow regeneration. BMBx on 5/18/2021 showing normal morphology with normal cellularity.    Post BM bx 12/2021  Primary hematologist reviewed the results of BM bx with patient. Plan was to restart Promacta 150mg daily and CSA 300mg BID. Patient will have lab work weekly on Tuesday prior to possible platelets to check for renal or hepatic toxicity and follow CSA levels.     -Recommendations  -Pt on cyclosporine, PLEASE check level and hold cyclosporine given fevers  -CBC/CMP/Bcx/UCx  -Infectious work up given fevers  -c/w promacta 150mg, this is a home medication and cannot be entered. Will need to run it by the pharmacy  -transfuse hgb <7 and plts <10 and <20 with fevers    Please page with questions or concerns. Will follow with you.      Pancho Samuels M.D.  Hematology/Oncology Fellow PGY4  Pager [phone]  After 5pm, please contact on-call team. Hematology aware patient is in the ED. Will need some work up before we see the patient.    35 year old man presented April 2020 with aplastic anemia (very severe AA) confirmed on bone marrow biopsy at [facility] and now s/p inpatient treatment beginning 4/23/2020 with horse ATG + CsA + promacta with complete remission. Bone marrow bx on 11/9/20 revealed hypocellular marrow, overall cellularity improved (20-50%) showing marrow regeneration. BMBx on 5/18/2021 showing normal morphology with normal cellularity.    Post BM bx 12/2021  Primary hematologist reviewed the results of BM bx with patient. Plan was to restart Promacta 150mg daily and CSA 300mg BID. Patient will have lab work weekly on Tuesday prior to possible platelets to check for renal or hepatic toxicity and follow CSA levels.     -Recommendations  -Pt on cyclosporine, PLEASE check level and hold cyclosporine given fevers  -CBC/CMP/Bcx/UCx  -Infectious work up given fevers  -c/w promacta 150mg, this is a home medication that we cannot prescribe, have pt take his own medication. Will need to run it by the pharmacy  -transfuse hgb <7 and plts <10 and <20 with fevers    Please page with questions or concerns. Will follow with you.      Pancho Samuels M.D.  Hematology/Oncology Fellow PGY4  Pager [phone]  After 5pm, please contact on-call team. Hematology aware patient is in the ED. Will need some work up before we see the patient.    35 year old man presented April 2020 with aplastic anemia (very severe AA) confirmed on bone marrow biopsy at [facility] and now s/p inpatient treatment beginning 4/23/2020 with horse ATG + CsA + promacta with complete remission. Bone marrow bx on 11/9/20 revealed hypocellular marrow, overall cellularity improved (20-50%) showing marrow regeneration. BMBx on 5/18/2021 showing normal morphology with normal cellularity.    Post BM bx 12/2021  Primary hematologist reviewed the results of BM bx with patient. Plan was to restart Promacta 150mg daily and CSA 300mg BID. Patient will have lab work weekly on Tuesday prior to possible platelets to check for renal or hepatic toxicity and follow CSA levels.     -Recommendations  -Pt on cyclosporine, PLEASE check level and hold cyclosporine given fevers  -CBC/CMP/Bcx/UCx  -Infectious work up given fevers  -c/w promacta 150mg, this is a home medication that we cannot prescribe, have pt take his own medication. Will need to run it by the pharmacy  -transfuse hgb <7 and plts <10K and <20K with fevers    Please page with questions or concerns. Will follow with you.      Pancho Samuels M.D.  Hematology/Oncology Fellow PGY4  Pager [phone]  After 5pm, please contact on-call team.

## 2021-12-21 NOTE — ASSESSMENT
[FreeTextEntry1] : 34 y/o M presented April 2020 with aplastic anemia (very severe AA) confirmed on bone marrow biopsy at [facility] and now s/p inpatient treatment beginning 4/23/2020 with horse ATG + CsA + promacta with complete remission. Bone marrow bx on 11/9/20 revealed hypocellular marrow, overall cellularity improved (20-50%) showing marrow regeneration. BMBx on 5/18/2021 showing normal morphology with normal cellularity. \par \par -Hematologic parameters had been back to normal, with excellent response to IST. He had no longer requiring any transfusional support. \par -s/p cyclosporine taper over 2-3 months and had stopped Promacta after 6 months, however now he has had a likely relapse as he is becoming pancytopenic and is platelet transfusion dependent again. \par -Will plan for urgent bone marrow biopsy. \par -Possible platelets three times weekly for now. \par -Discussed allogeneic bone marrow transplantation with the patient and that repeat courses of IST is also an option although with a lower response rate. Also, given recent cyclosporine taper completed 2 months ago one option can be to re-escalate cyclosporine, although that is with an uncertain endpoint. Will revisit after BMBX. \par -Patient understands and agrees with plan. All information explained to the best of my ability.\par \par

## 2021-12-21 NOTE — ED PROVIDER NOTE - ATTENDING CONTRIBUTION TO CARE
attending Dudley: 36yM h/o aplastic anemia dx 2020 p/w nose bleed and fever (Tmax 103) x 1 day. Epistaxis since resolved. Denies focal infectious complaints. Not vaccinated against covid. Will obtain labs eval for neutropenia, RVP, cxr, UA/Ucx, likely admit

## 2021-12-21 NOTE — H&P ADULT - PROBLEM SELECTOR PLAN 4
- hold cyclosporine per Hemonc due to episode of fever  - Will discuss with Hemonc regarding pt unable to get Promata for tx   - c/w with abx prophylaxis: acylovir 400 mg TID, diflucan 200 mg daily   - not started on levofloxin due to intolerability

## 2021-12-21 NOTE — ED PROVIDER NOTE - OBJECTIVE STATEMENT
37 yo M with hx of aplastic anemia dx 2020 p/w nose bleed and fever (Tmax 103) x 1 day. States that his partner noted that he had rigors last night. This morning woke up with a persistent nose bleed. Questioned patient regarding triage note referring to "stiff neck" states that it was sore this morning, now resolved. Denies cough, chest pain, shortness of breath, urinary symptoms.     Megan- Dr. Bradley Goldberg

## 2021-12-21 NOTE — H&P ADULT - NSHPPHYSICALEXAM_GEN_ALL_CORE
Appearance: Alert and oriented x 3.   Mouth: nonerythematous oropharynx, petechia alone hard palate, black seen on two teeth on R. side in upper and lower   Nose: Visible bleeding from bilateral nostril   Neck: no cervical lymphadenopathy. FROM of neck with no stiffness. Negative Kernig.   Cardio: Abnormal rhythm, tachycardiac. no murmur, rubs or gallops  Respiratory: Clear to ascultation bilaterally   GI: Normal bowel sounds. No tenderness to palpation.  Extremities: no pedal edema   Vascular: 2+ pulses of upper and lower extremities Vital Signs Last 24 Hrs  T(C): 37 (21 Dec 2021 18:41), Max: 37.1 (21 Dec 2021 13:12)  T(F): 98.6 (21 Dec 2021 18:41), Max: 98.7 (21 Dec 2021 13:12)  HR: 109 (21 Dec 2021 18:41) (109 - 118)  BP: 150/93 (21 Dec 2021 18:41) (137/93 - 150/96)  BP(mean): --  RR: 19 (21 Dec 2021 18:41) (17 - 19)  SpO2: 99% (21 Dec 2021 18:41) (99% - 99%)    Appearance: Alert and oriented x 3.   Eyes: Anicteric Sclera  Mouth: nonerythematous oropharynx, petechia alone hard palate, black seen on two teeth on R. side in upper and lower   Nose: Visible bleeding from bilateral nostril   Neck: no cervical lymphadenopathy. FROM of neck with no stiffness. Negative Kernig.   Cardio: Abnormal rhythm, tachycardiac. no murmur, rubs or gallops  Respiratory: Clear to ascultation bilaterally   GI: Normal bowel sounds. No tenderness to palpation.  Extremities: no pedal edema   Vascular: 2+ pulses of upper and lower extremities  Neuro: AAOx4, moving all 4 extremities. CN II-XII intact.  LAD: No cervical or submandibularLAD  Psych: Normal Affect and behavior.

## 2021-12-21 NOTE — ED PROVIDER NOTE - CLINICAL SUMMARY MEDICAL DECISION MAKING FREE TEXT BOX
37 yo M with hx of aplastic anemia dx 2020 p/w nose bleed and fever (Tmax 103) x 1 day. well appearing, dried blood in nares, clear lungs, abdomen soft nontender. C/f neutropenic fever. Plan for sepsis labs, transfuse as necessary and admit.

## 2021-12-21 NOTE — H&P ADULT - NSHPLABSRESULTS_GEN_ALL_CORE
CBC Full  -  ( 21 Dec 2021 13:39 )  WBC Count : 2.46 K/uL  RBC Count : 3.27 M/uL  Hemoglobin : 10.1 g/dL  Hematocrit : 27.5 %  Platelet Count - Automated : 4 K/uL  Mean Cell Volume : 84.1 fl  Mean Cell Hemoglobin : 30.9 pg  Mean Cell Hemoglobin Concentration : 36.7 gm/dL  Auto Neutrophil # : 0.31 K/uL  Auto Lymphocyte # : 2.08 K/uL  Auto Monocyte # : 0.07 K/uL  Auto Eosinophil # : 0.00 K/uL  Auto Basophil # : 0.00 K/uL  Auto Neutrophil % : 11.7 %  Auto Lymphocyte % : 84.7 %  Auto Monocyte % : 2.7 %  Auto Eosinophil % : 0.0 %  Auto Basophil % : 0.0 %    12-21    138  |  103  |  22  ----------------------------<  101<H>  4.4   |  21<L>  |  1.18    Ca    9.3      21 Dec 2021 13:39    TPro  7.6  /  Alb  4.5  /  TBili  0.9  /  DBili  x   /  AST  17  /  ALT  37  /  AlkPhos  87  12-21    Activated Partial Thromboplastin Time in AM (12.21.21 @ 13:39) Prothrombin Time, Plasma: 14.1 sec (12.21.21 @ 13:39) INR: 1.18    Lactate: 1.1     < from: Xray Chest 1 View- PORTABLE-Urgent (Xray Chest 1 View- PORTABLE-Urgent .) (12.21.21 @ 14:08) >    EXAM:  XR CHEST PORTABLE URGENT 1V                          PROCEDURE DATE:  12/21/2021      INTERPRETATION:  Chest one view    HISTORY: Fever    COMPARISON STUDY: 5/16/2020    Frontal expiratory view of the chest shows the heart to be normal in   size. The lungs are clear and there is no evidence of pneumothorax nor   pleural effusion.    IMPRESSION:  No active pulmonary disease.    < end of copied text >

## 2021-12-21 NOTE — REVIEW OF SYSTEMS
[Eye Pain] : no eye pain [Vision Problems] : no vision problems [Dysphagia] : no dysphagia [Nosebleeds] : no nosebleeds [Odynophagia] : no odynophagia [Chest Pain] : no chest pain [Palpitations] : no palpitations [Lower Ext Edema] : no lower extremity edema [Joint Pain] : no joint pain [Joint Stiffness] : no joint stiffness [Muscle Pain] : no muscle pain [Dizziness] : no dizziness [Fainting] : no fainting [Insomnia] : no insomnia [Anxiety] : no anxiety [Hot Flashes] : no hot flashes

## 2021-12-21 NOTE — H&P ADULT - ASSESSMENT
36 y.o M with history of aplastic anemia currently in treatment with cyclosporine and following with Dr. Goldberg who presents with persistent nose bleeding and fever in the setting of neutropenia.

## 2021-12-21 NOTE — H&P ADULT - PROBLEM SELECTOR PLAN 5
- CBC in AM   - Ordered LDH to check for hemolysis   - s/p 2 units of platelet transfusion   - if pt develops head, will be head CT given plt of 4 high risk for spontaneous hemorrhage

## 2021-12-21 NOTE — HISTORY OF PRESENT ILLNESS
[de-identified] : 33 y/o M with no PMH presented to OSH originally for c/o dizziness and feeling unwell and was then transferred to [facility] for further hematologic evaluation as patient was found to be severely pancytopenic. Patient first started noticing scattered bruising throughout his body (mostly thighs and arms) about 6 weeks prior to presentation, and also had epistaxis. Pt had also noticed intermittent fevers and chills, responsive to tylenol, and noticed very dark stools for the same time period. Also with spontaneous gum bleeding and endorsed severe fatigue and SOB with exertion. Also noted 15 lbs weight loss, partially intentional over past 2-3 months prior to presentation.  Also c/o mild headache located in posterior aspect of his head, denies sore throat, cough, +nausea but no vomiting, no abdominal pain, no diarrhea, no BRBPR, no urinary symptoms, no LE swelling, +visual changes.     \par \par Patient was transferred to [facility] from [facility] on 4/18. Patient was started on IVF, Levaquin, and allopurinol. Patient transfused blood and platelets. Bone Marrow biopsy performed on 4/20 consistent with Aplastic Anemia. 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 Because of this, patient's platelet transfusion threshold was made 50k. Horse ATG test dose was applied 4/22 with no reaction. Treatment started 4/23 consisting of equine ATG at 40mg/kg/day x 4 days, Cyclosporine 10mg/kg/day divided doses BID, Eltrombopag 150mg daily. Patient had episode of hives with ATG infusion on Day 1 thus steroid regimen was changed from oral prednisone to IV Solumedrol Q8 for the duration of ATG treatment and transitioned back to oral prednisone 1mg/kg daily for days 5-10 then tapered. The patient had grade 3 transaminitis likely from Promacta and ATG and Promacta was held as of 5/1. While patient was in the hospital, his course was also complicated by epigastric pain consistent with dyspepsia, most likely due to cyclosporine. Was managed on pepcid BID and sucralfate q6 hours and mylanta as needed with good relief. \par \par On 5/12 the patient's transaminitis had improved(grade 2 total bili, grade 1 AST, grade 2 ALT) and Promacta 150 mg po QD was resumed. Pt's cyclosporine dose was adjusted  according to the level (goal 200-400) and he was on 400 mg po q12h as of 5/18. On 5/16, patient developed chest discomfort during platelet transfusion which improved with benadryl. EKG and CXR were wnl. Patient originally refused benadryl pre-medication prior to platelets that day due to side effects with taking Benadryl, and it was decided to premedicate pt with Claritin or Zyrtec pre blood transfusion instead. On 5/20, cyclosporine level was 460, therefore dose was lowered to 300 mg PO BID. Patient's vision had improved, so platelet transfusion threshold liberalized to 20k. \par \par Patient has since had a complete recovery of blood counts. He discontinued Promacta after 6 months of therapy. He has been monitored since then wit stable CBC and therapeutic cyclosporine levels. \par \par He has since had repeat bone marrow biopsy on 5/18/2021 with normal morphology and cellularity\par  [de-identified] : - Markedly hypocellular bone marrow (mostly 5% and focal 10-\par 15%) with focal minimal erythropoiesis, minimal to absent\par myelopoiesis, absent megakaryocytes, increased iron stores. Aplastic bone marrow.  [de-identified] : Cytogenetics: \par \par Result: Male karyotype\par Karyotype: 46,XY               {12               } [de-identified] : very severe Aplastic Anemia [FreeTextEntry1] : Therapy initiated 4/23/2020 [de-identified] : Patient noticed increased petechiae on his shins so came in for bloodwork and was found with profound thrombocytopenia. He has since been found to have progressive pancytopenia and is now neutropenic. He has been receiving platelets transfusions and responding, and reports no bleeding. He denies any fevers or other constitutional symptoms at this time. No night sweats at the moment. He reports his appetite is good, he has no shortness of breath, chest pain or palpitations, and he feels energetic and otherwise not ill.

## 2021-12-21 NOTE — H&P ADULT - NSHPSOCIALHISTORY_GEN_ALL_CORE
Currently living with girlfriend Currently living with girlfriend. Not smoker. No illicit drug use. Works as .

## 2021-12-21 NOTE — H&P ADULT - NSICDXFAMILYHX_GEN_ALL_CORE_FT
FAMILY HISTORY:  Grandparent  Still living? Unknown  FH: type 2 diabetes mellitus, Age at diagnosis: Age Unknown

## 2021-12-21 NOTE — H&P ADULT - NSHPREVIEWOFSYSTEMS_GEN_ALL_CORE
HEENT: no nasal congestion, positive for HA, no chills   Cardio: no chest pain  Respiratory: no SOB  GI: No changes in bowel movement, no hematochezia   : no dysuria or hematuria

## 2021-12-21 NOTE — PHYSICAL EXAM
[de-identified] : JEANETH [de-identified] : supple [de-identified] : (+) S1S2 RRR [de-identified] : no edema (+)pp [de-identified] : no tenderness [de-identified] : ROM intact [de-identified] : no obvious petechiae - some mild on anterior lower shin [de-identified] : A&Ox3

## 2021-12-21 NOTE — H&P ADULT - HISTORY OF PRESENT ILLNESS
36 y.o M hx of aplastic anemia dx via bone biospy at [facility] 4/2020 who presents with active nose bleeding and fever of Tmax 103.            In the ED:   VItals; BP , HR < Tmax , RR and O2 saturation of   Labs: Plt 4, WBC 2.46, Hbg 10.1, bicarb 21   Imaging: Cxr w/o acute findings   Interventions: Acetaminophen 650mg, 2 units of Platelets and Cefepime 2grams x1 at 1600  36 y.o M hx of aplastic anemia dx via bone biospy at [facility] 4/2020 following with Dr. Goldberg and currently in treatment with cyclosporine who presents with  nose bleeding and fever of Tmax 103.  Last night pt's gf saw pt with rigors, described as extreme shaking throughout the night. In the morning, his fever was taken due to tactile fever with no chills which showed temp of 103.0 oral. Pt took Tylenol and said he was fine afterwards. Pt mentions he woke up with some new bruising on L arm and had nose bleed from b/l nostril. Pt states bleeding was not excessive but was persistently occurring intermittently throughout the day. Pt also mentions having some R. side jaw pain associated with toothache on lower back tooth. Pt denies any chest pain, shortness of breath, cough, nasal congestion, abdominal pain.    In the ED:   VItals; BP  137/93, ,  Temp 97.9 , RR 18 and O2 saturation of 99  Labs: Plt 4, WBC 2.46, Hbg 10.1, bicarb 21, , RVP neg   Imaging: Cxr w/o acute findings   Interventions: Acetaminophen 650mg, 2 units of Platelets and Cefepime 2grams x1 at 1600

## 2021-12-22 ENCOUNTER — OUTPATIENT (OUTPATIENT)
Dept: OUTPATIENT SERVICES | Facility: HOSPITAL | Age: 36
LOS: 1 days | Discharge: HOME | End: 2021-12-22

## 2021-12-22 DIAGNOSIS — D64.9 ANEMIA, UNSPECIFIED: ICD-10-CM

## 2021-12-22 DIAGNOSIS — Z98.890 OTHER SPECIFIED POSTPROCEDURAL STATES: Chronic | ICD-10-CM

## 2021-12-22 LAB
ALBUMIN SERPL ELPH-MCNC: 4.2 G/DL — SIGNIFICANT CHANGE UP (ref 3.3–5)
ALP SERPL-CCNC: 74 U/L — SIGNIFICANT CHANGE UP (ref 40–120)
ALT FLD-CCNC: 30 U/L — SIGNIFICANT CHANGE UP (ref 10–45)
ANION GAP SERPL CALC-SCNC: 12 MMOL/L — SIGNIFICANT CHANGE UP (ref 5–17)
ANION GAP SERPL CALC-SCNC: 13 MMOL/L — SIGNIFICANT CHANGE UP (ref 5–17)
AST SERPL-CCNC: 14 U/L — SIGNIFICANT CHANGE UP (ref 10–40)
BASOPHILS # BLD AUTO: 0 K/UL — SIGNIFICANT CHANGE UP (ref 0–0.2)
BASOPHILS NFR BLD AUTO: 0 % — SIGNIFICANT CHANGE UP (ref 0–2)
BILIRUB SERPL-MCNC: 0.8 MG/DL — SIGNIFICANT CHANGE UP (ref 0.2–1.2)
BLD GP AB SCN SERPL QL: NEGATIVE — SIGNIFICANT CHANGE UP
BUN SERPL-MCNC: 20 MG/DL — SIGNIFICANT CHANGE UP (ref 7–23)
BUN SERPL-MCNC: 22 MG/DL — SIGNIFICANT CHANGE UP (ref 7–23)
CALCIUM SERPL-MCNC: 10.1 MG/DL — SIGNIFICANT CHANGE UP (ref 8.4–10.5)
CALCIUM SERPL-MCNC: 9.3 MG/DL — SIGNIFICANT CHANGE UP (ref 8.4–10.5)
CHLORIDE SERPL-SCNC: 102 MMOL/L — SIGNIFICANT CHANGE UP (ref 96–108)
CHLORIDE SERPL-SCNC: 104 MMOL/L — SIGNIFICANT CHANGE UP (ref 96–108)
CO2 SERPL-SCNC: 22 MMOL/L — SIGNIFICANT CHANGE UP (ref 22–31)
CO2 SERPL-SCNC: 23 MMOL/L — SIGNIFICANT CHANGE UP (ref 22–31)
CREAT SERPL-MCNC: 1.06 MG/DL — SIGNIFICANT CHANGE UP (ref 0.5–1.3)
CREAT SERPL-MCNC: 1.19 MG/DL — SIGNIFICANT CHANGE UP (ref 0.5–1.3)
CULTURE RESULTS: NO GROWTH — SIGNIFICANT CHANGE UP
EOSINOPHIL # BLD AUTO: 0.01 K/UL — SIGNIFICANT CHANGE UP (ref 0–0.5)
EOSINOPHIL NFR BLD AUTO: 0.6 % — SIGNIFICANT CHANGE UP (ref 0–6)
GLUCOSE SERPL-MCNC: 107 MG/DL — HIGH (ref 70–99)
GLUCOSE SERPL-MCNC: 98 MG/DL — SIGNIFICANT CHANGE UP (ref 70–99)
HCT VFR BLD CALC: 24.7 % — LOW (ref 39–50)
HCT VFR BLD CALC: 24.9 % — LOW (ref 39–50)
HGB BLD-MCNC: 8.7 G/DL — LOW (ref 13–17)
HGB BLD-MCNC: 8.9 G/DL — LOW (ref 13–17)
LDH SERPL L TO P-CCNC: 186 U/L — SIGNIFICANT CHANGE UP (ref 50–242)
LYMPHOCYTES # BLD AUTO: 1.4 K/UL — SIGNIFICANT CHANGE UP (ref 1–3.3)
LYMPHOCYTES # BLD AUTO: 83.8 % — HIGH (ref 13–44)
MAGNESIUM SERPL-MCNC: 1.5 MG/DL — LOW (ref 1.6–2.6)
MAGNESIUM SERPL-MCNC: 2 MG/DL — SIGNIFICANT CHANGE UP (ref 1.6–2.6)
MCHC RBC-ENTMCNC: 29.7 PG — SIGNIFICANT CHANGE UP (ref 27–34)
MCHC RBC-ENTMCNC: 30.4 PG — SIGNIFICANT CHANGE UP (ref 27–34)
MCHC RBC-ENTMCNC: 35.2 GM/DL — SIGNIFICANT CHANGE UP (ref 32–36)
MCHC RBC-ENTMCNC: 35.7 GM/DL — SIGNIFICANT CHANGE UP (ref 32–36)
MCV RBC AUTO: 84.3 FL — SIGNIFICANT CHANGE UP (ref 80–100)
MCV RBC AUTO: 85 FL — SIGNIFICANT CHANGE UP (ref 80–100)
MONOCYTES # BLD AUTO: 0.09 K/UL — SIGNIFICANT CHANGE UP (ref 0–0.9)
MONOCYTES NFR BLD AUTO: 5.4 % — SIGNIFICANT CHANGE UP (ref 2–14)
NEUTROPHILS # BLD AUTO: 0.17 K/UL — LOW (ref 1.8–7.4)
NEUTROPHILS NFR BLD AUTO: 10.2 % — LOW (ref 43–77)
NRBC # BLD: 0 /100 WBCS — SIGNIFICANT CHANGE UP (ref 0–0)
NRBC # BLD: 0 /100 WBCS — SIGNIFICANT CHANGE UP (ref 0–0)
PHOSPHATE SERPL-MCNC: 3.4 MG/DL — SIGNIFICANT CHANGE UP (ref 2.5–4.5)
PHOSPHATE SERPL-MCNC: 3.9 MG/DL — SIGNIFICANT CHANGE UP (ref 2.5–4.5)
PLATELET # BLD AUTO: 69 K/UL — LOW (ref 150–400)
PLATELET # BLD AUTO: 76 K/UL — LOW (ref 150–400)
POTASSIUM SERPL-MCNC: 4 MMOL/L — SIGNIFICANT CHANGE UP (ref 3.5–5.3)
POTASSIUM SERPL-MCNC: 4.4 MMOL/L — SIGNIFICANT CHANGE UP (ref 3.5–5.3)
POTASSIUM SERPL-SCNC: 4 MMOL/L — SIGNIFICANT CHANGE UP (ref 3.5–5.3)
POTASSIUM SERPL-SCNC: 4.4 MMOL/L — SIGNIFICANT CHANGE UP (ref 3.5–5.3)
PROT SERPL-MCNC: 7.1 G/DL — SIGNIFICANT CHANGE UP (ref 6–8.3)
RBC # BLD: 2.93 M/UL — LOW (ref 4.2–5.8)
RBC # BLD: 2.93 M/UL — LOW (ref 4.2–5.8)
RBC # FLD: 12.3 % — SIGNIFICANT CHANGE UP (ref 10.3–14.5)
RBC # FLD: 12.5 % — SIGNIFICANT CHANGE UP (ref 10.3–14.5)
RH IG SCN BLD-IMP: POSITIVE — SIGNIFICANT CHANGE UP
SODIUM SERPL-SCNC: 138 MMOL/L — SIGNIFICANT CHANGE UP (ref 135–145)
SODIUM SERPL-SCNC: 138 MMOL/L — SIGNIFICANT CHANGE UP (ref 135–145)
SPECIMEN SOURCE: SIGNIFICANT CHANGE UP
TROPONIN T, HIGH SENSITIVITY RESULT: <6 NG/L — SIGNIFICANT CHANGE UP (ref 0–51)
WBC # BLD: 1.67 K/UL — LOW (ref 3.8–10.5)
WBC # BLD: 2.01 K/UL — LOW (ref 3.8–10.5)
WBC # FLD AUTO: 1.67 K/UL — LOW (ref 3.8–10.5)
WBC # FLD AUTO: 2.01 K/UL — LOW (ref 3.8–10.5)

## 2021-12-22 PROCEDURE — 93010 ELECTROCARDIOGRAM REPORT: CPT

## 2021-12-22 PROCEDURE — 99233 SBSQ HOSP IP/OBS HIGH 50: CPT | Mod: GC

## 2021-12-22 PROCEDURE — 99232 SBSQ HOSP IP/OBS MODERATE 35: CPT | Mod: GC

## 2021-12-22 PROCEDURE — 99223 1ST HOSP IP/OBS HIGH 75: CPT

## 2021-12-22 RX ORDER — FLUCONAZOLE 150 MG/1
200 TABLET ORAL DAILY
Refills: 0 | Status: DISCONTINUED | OUTPATIENT
Start: 2021-12-22 | End: 2022-01-13

## 2021-12-22 RX ORDER — ACYCLOVIR SODIUM 500 MG
400 VIAL (EA) INTRAVENOUS EVERY 8 HOURS
Refills: 0 | Status: DISCONTINUED | OUTPATIENT
Start: 2021-12-22 | End: 2022-01-20

## 2021-12-22 RX ORDER — SODIUM CHLORIDE 9 MG/ML
500 INJECTION, SOLUTION INTRAVENOUS ONCE
Refills: 0 | Status: COMPLETED | OUTPATIENT
Start: 2021-12-22 | End: 2021-12-22

## 2021-12-22 RX ORDER — MAGNESIUM SULFATE 500 MG/ML
1 VIAL (ML) INJECTION ONCE
Refills: 0 | Status: COMPLETED | OUTPATIENT
Start: 2021-12-22 | End: 2021-12-22

## 2021-12-22 RX ADMIN — FLUCONAZOLE 200 MILLIGRAM(S): 150 TABLET ORAL at 13:38

## 2021-12-22 RX ADMIN — SODIUM CHLORIDE 500 MILLILITER(S): 9 INJECTION, SOLUTION INTRAVENOUS at 22:46

## 2021-12-22 RX ADMIN — Medication 400 MILLIGRAM(S): at 14:54

## 2021-12-22 RX ADMIN — FAMOTIDINE 20 MILLIGRAM(S): 10 INJECTION INTRAVENOUS at 05:14

## 2021-12-22 RX ADMIN — FAMOTIDINE 20 MILLIGRAM(S): 10 INJECTION INTRAVENOUS at 17:16

## 2021-12-22 RX ADMIN — CEFEPIME 100 MILLIGRAM(S): 1 INJECTION, POWDER, FOR SOLUTION INTRAMUSCULAR; INTRAVENOUS at 14:53

## 2021-12-22 RX ADMIN — CEFEPIME 100 MILLIGRAM(S): 1 INJECTION, POWDER, FOR SOLUTION INTRAMUSCULAR; INTRAVENOUS at 21:50

## 2021-12-22 RX ADMIN — CEFEPIME 100 MILLIGRAM(S): 1 INJECTION, POWDER, FOR SOLUTION INTRAMUSCULAR; INTRAVENOUS at 05:14

## 2021-12-22 RX ADMIN — Medication 400 MILLIGRAM(S): at 05:14

## 2021-12-22 RX ADMIN — Medication 100 GRAM(S): at 13:38

## 2021-12-22 RX ADMIN — Medication 400 MILLIGRAM(S): at 21:50

## 2021-12-22 NOTE — CONSULT NOTE ADULT - SUBJECTIVE AND OBJECTIVE BOX
Hematology/Oncology Consult Note    HPI:  36 y.o M hx of aplastic anemia dx via bone biospy at [facility] 4/2020 following with Dr. Goldberg and currently in treatment with cyclosporine who presents with  nose bleeding and fever of Tmax 103.  Last night pt's gf saw pt with rigors, described as extreme shaking throughout the night. In the morning, his fever was taken due to tactile fever with no chills which showed temp of 103.0 oral. Pt took Tylenol and said he was fine afterwards. Pt mentions he woke up with some new bruising on L arm and had nose bleed from b/l nostril. Pt states bleeding was not excessive but was persistently occurring intermittently throughout the day. Pt also mentions having some R. side jaw pain associated with toothache on lower back tooth. Pt denies any chest pain, shortness of breath, cough, nasal congestion, abdominal pain.    In the ED:   VItals; BP  137/93, ,  Temp 97.9 , RR 18 and O2 saturation of 99  Labs: Plt 4, WBC 2.46, Hbg 10.1, bicarb 21, , RVP neg   Imaging: Cxr w/o acute findings   Interventions: Acetaminophen 650mg, 2 units of Platelets and Cefepime 2grams x1 at 1600  (21 Dec 2021 17:22)      Allergies    No Known Allergies    Intolerances        MEDICATIONS  (STANDING):  acyclovir   Oral Tab/Cap 400 milliGRAM(s) Oral every 8 hours  cefepime   IVPB 2000 milliGRAM(s) IV Intermittent every 8 hours  famotidine    Tablet 20 milliGRAM(s) Oral two times a day  fluconAZOLE   Tablet 200 milliGRAM(s) Oral daily    MEDICATIONS  (PRN):      PAST MEDICAL & SURGICAL HISTORY:  Asthma, stable    Redundant prepuce and phimosis    H/O circumcision        FAMILY HISTORY:  FH: type 2 diabetes mellitus (Grandparent)        SOCIAL HISTORY: No EtOH, no tobacco    REVIEW OF SYSTEMS:    CONSTITUTIONAL: No weakness, fevers or chills  EYES/ENT: No visual changes;  No vertigo or throat pain   NECK: No pain or stiffness  RESPIRATORY: No cough, wheezing, hemoptysis; No shortness of breath  CARDIOVASCULAR: No chest pain or palpitations  GASTROINTESTINAL: No abdominal or epigastric pain. No nausea, vomiting, or hematemesis; No diarrhea or constipation. No melena or hematochezia.  GENITOURINARY: No dysuria, frequency or hematuria  NEUROLOGICAL: No numbness or weakness  SKIN: No itching, burning, rashes, or lesions   All other review of systems is negative unless indicated above.    Height (cm): 175.3 (12-21 @ 22:08)  Weight (kg): 104.1 (12-21 @ 22:08)  BMI (kg/m2): 33.9 (12-21 @ 22:08)  BSA (m2): 2.19 (12-21 @ 22:08)    T(F): 98.2 (12-22-21 @ 05:14), Max: 98.7 (12-21-21 @ 13:12)  HR: 100 (12-22-21 @ 05:14) (75 - 118)  BP: 113/71 (12-22-21 @ 05:14)  RR: 18 (12-22-21 @ 05:14)  SpO2: 99% (12-22-21 @ 05:14) (98% - 99%)    Physical Exam  GENERAL: NAD, well-developed  HEAD:  Atraumatic, Normocephalic  EYES: EOMI, PERRLA, conjunctiva and sclera clear  NECK: Supple, No JVD  CHEST/LUNG: Clear to auscultation bilaterally; No wheeze  HEART: Regular rate and rhythm; No murmurs, rubs, or gallops  ABDOMEN: Soft, Nontender, Nondistended; Bowel sounds present  EXTREMITIES:  2+ Peripheral Pulses, No clubbing, cyanosis, or edema  NEUROLOGY: non-focal  SKIN: No rashes or lesions                          8.7    1.67  )-----------( 76       ( 22 Dec 2021 07:05 )             24.7       12-22    138  |  104  |  22  ----------------------------<  98  4.4   |  22  |  1.06    Ca    9.3      22 Dec 2021 07:05  Phos  3.4     12-22  Mg     1.5     12-22    TPro  7.1  /  Alb  4.2  /  TBili  0.8  /  DBili  x   /  AST  14  /  ALT  30  /  AlkPhos  74  12-22      Magnesium, Serum: 1.5 mg/dL (12-22 @ 07:05)  Phosphorus Level, Serum: 3.4 mg/dL (12-22 @ 07:05)  Lactate Dehydrogenase, Serum: 186 U/L (12-22 @ 07:05)          Imaging:  < from: Xray Chest 1 View- PORTABLE-Urgent (Xray Chest 1 View- PORTABLE-Urgent .) (12.21.21 @ 14:08) >  IMPRESSION:  No active pulmonary disease.

## 2021-12-22 NOTE — CONSULT NOTE ADULT - ATTENDING COMMENTS
36-yr-old man with h/o MADAY (2020) s/p ATG and CSA achieved CR but unfortunately appears to have been relapsing. Did not get upfront SCT likely due to unavailability of matched sibling. Reportedly did not continue Promacta due to insurance issue. Will now need rATG-CSA challenge. Transfer to [facility] when bed available. Supportive. (Also should think of unrelated match donor SCT.) 36-yr-old man with h/o MADAY (2020) s/p ATG and CSA achieved CR but unfortunately appears to have been relapsing (admitted with epistaxis, pancytopenia).  Did not get upfront SCT likely due to unavailability of matched sibling donor. Reportedly did not continue Promacta due to insurance issue. Will now need rATG-CSA challenge. Transfer to [facility] when bed available. Supportive. (Also should think of unrelated match donor SCT.)

## 2021-12-22 NOTE — CONSULT NOTE ADULT - SUBJECTIVE AND OBJECTIVE BOX
Alma Baker - [phone]    Patient is a 36y old  Male who presents with a chief complaint of nosebleed and fever (22 Dec 2021 14:22)    HPI:  36M found to be pancytopenic in . Bone Marrow biopsy at that time was consistent with Aplastic Anemia.  Hospitalized for treatment. Course complicated by intraretinal hemorrhage.  Received equine ATG, Cyclosporine, and Promacta.  Went into remission.  BM bx 2021 with normal morphology and cellularity.  However, developed pancytopenia and BM bx repeated.  Admitted overnight with fever/rigors and epistaxis.  He also noted right jaw pain associated with toothache on lower back tooth.  Seen by dental who did not find odontogenic problem.  Found to be neutropenic.      ID asked to help management    prior hospital charts reviewed [x  ]  primary team notes reviewed [x  ]  other consultant notes reviewed [ x ]    PAST MEDICAL & SURGICAL HISTORY:  Aplastic Anemia  Asthma, stable  Redundant prepuce and phimosis  H/O circumcision    Allergies  No Known Allergies    ANTIMICROBIALS:  acyclovir   Oral Tab/Cap 400 every 8 hours  cefepime   IVPB 2000 every 8 hours (-)  fluconAZOLE   Tablet 200 daily    MEDICATIONS  (STANDING):  famotidine    Tablet 20 two times a day    SOCIAL HISTORY: former smoker, social etoh    FAMILY HISTORY:  FH: type 2 diabetes mellitus (Grandparent)    REVIEW OF SYSTEMS  [  ] ROS unobtainable because:    [  ] All other systems negative except as noted below:	    Constitutional:  [ ] fever [ ] chills  [ ] weight loss  [ ] weakness  Skin:  [ ] rash [ ] phlebitis	  Eyes: [ ] icterus [ ] pain  [ ] discharge	  ENMT: [ ] sore throat  [ ] thrush [ ] ulcers [ ] exudates  Respiratory: [ ] dyspnea [ ] hemoptysis [ ] cough [ ] sputum	  Cardiovascular:  [ ] chest pain [ ] palpitations [ ] edema	  Gastrointestinal:  [ ] nausea [ ] vomiting [ ] diarrhea [ ] constipation [ ] pain	  Genitourinary:  [ ] dysuria [ ] frequency [ ] hematuria [ ] discharge [ ] flank pain  [ ] incontinence  Musculoskeletal:  [ ] myalgias [ ] arthralgias [ ] arthritis  [ ] back pain  Neurological:  [ ] headache [ ] seizures  [ ] confusion/altered mental status  Psychiatric:  [ ] anxiety [ ] depression	  Hematology/Lymphatics:  [ ] lymphadenopathy  Endocrine:  [ ] adrenal [ ] thyroid  Allergic/Immunologic:	 [ ] transplant [ ] seasonal    Vital Signs Last 24 Hrs  T(F): 98.8 (21 @ 13:08), Max: 98.8 (21 @ 13:08)  Vital Signs Last 24 Hrs  HR: 108 (21 @ 13:08) (75 - 111)  BP: 124/77 (21 @ 13:08) (113/71 - 152/88)  RR: 18 (21 @ 13:08)  SpO2: 99% (21 @ 13:08) (98% - 99%)  Wt(kg): --    PHYSICAL EXAM:                              8.7    1.67  )-----------( 76       ( 22 Dec 2021 07:05 )             24.7     138  |  104  |  22  ----------------------------<  98  4.4   |  22  |  1.06    Ca    9.3      22 Dec 2021 07:05  Phos  3.4       Mg     1.5         TPro  7.1  /  Alb  4.2  /  TBili  0.8  /  DBili  x   /  AST  14  /  ALT  30  /  AlkPhos  74      Auto Neutrophil #: 0.17 K/uL (21 @ 07:05)  Auto Neutrophil #: 0.31 K/uL (21 @ 13:39)  Auto Neutrophil #: 0.17 K/uL (21 @ 16:30)  Auto Neutrophil #: 0.23 K/uL (21 @ 16:04)  Auto Neutrophil #: 0.14 K/uL (12-10-21 @ 16:12)  Auto Neutrophil #: 0.25 K/uL (21 @ 16:31)  Auto Neutrophil #: 0.38 K/uL (21 @ 16:19)  Auto Neutrophil #: 0.44 K/uL (21 @ 09:07)    Urinalysis Basic - ( 21 Dec 2021 20:53 )  Color: Yellow / Appearance: Clear / S.034 / pH: x  Gluc: x / Ketone: Negative  / Bili: Negative / Urobili: Negative   Blood: x / Protein: 30 mg/dL / Nitrite: Negative   Leuk Esterase: Negative / RBC: 2 /hpf / WBC 1 /HPF   Sq Epi: x / Non Sq Epi: 0 /hpf / Bacteria: Negative    MICROBIOLOGY:   BC, UC pending    Rapid RVP Result: NotDetec ( @ 13:37)    RADIOLOGY:  imaging below personally reviewed and agree with findings    Xray Chest 1 View- PORTABLE-Urgent (Xray Chest 1 View- PORTABLE-Urgent .) (21 @ 14:08) >  IMPRESSION:  No active pulmonary disease.     Alma Baker - [phone]    Patient is a 36y old  Male who presents with a chief complaint of nosebleed and fever (22 Dec 2021 14:22)    HPI:  36M found to be pancytopenic in . Bone Marrow biopsy at that time was consistent with Aplastic Anemia.  Hospitalized for treatment. Course complicated by intraretinal hemorrhage.  Received equine ATG, Cyclosporine, and Promacta.  Went into remission.  BM bx 2021 with normal morphology and cellularity.  However, developed pancytopenia and BM bx repeated.  Admitted overnight with fever/rigors and epistaxis.  He also noted right jaw pain associated with toothache on lower back tooth.  Seen by dental who did not find odontogenic problem.  Found to be neutropenic.  He lives at home with his dog and girlfriend.  No recent travel.  Works from home.  Denies headache.  No n/v/d. No dysuria.  Mild sorethroat.  Denies sick contacts.  Not COVID vaccinated.  His girlfriend just received Dose #2.      ID asked to help management    prior hospital charts reviewed [x  ]  primary team notes reviewed [x  ]  other consultant notes reviewed [ x ]    PAST MEDICAL & SURGICAL HISTORY:  Aplastic Anemia  Asthma, stable  Redundant prepuce and phimosis  H/O circumcision    Allergies  No Known Allergies    ANTIMICROBIALS:  acyclovir   Oral Tab/Cap 400 every 8 hours  cefepime   IVPB 2000 every 8 hours (-)  fluconAZOLE   Tablet 200 daily    MEDICATIONS  (STANDING):  famotidine    Tablet 20 two times a day    SOCIAL HISTORY: former smoker, social etoh    FAMILY HISTORY:  FH: type 2 diabetes mellitus (Grandparent)    REVIEW OF SYSTEMS  [  ] ROS unobtainable because:    [x  ] All other systems negative except as noted below:	    Constitutional:  [ x] fever [ x] chills  [ ] weight loss  [ ] weakness  Skin:  [ ] rash [ ] phlebitis	  Eyes: [ ] icterus [ ] pain  [x ] epistaxis	  ENMT: [x ] sore throat  [ ] thrush [ ] ulcers [ ] exudates  Respiratory: [ ] dyspnea [ ] hemoptysis [ ] cough [ ] sputum	  Cardiovascular:  [ ] chest pain [ ] palpitations [ ] edema	  Gastrointestinal:  [ ] nausea [ ] vomiting [ ] diarrhea [ ] constipation [ ] pain	  Genitourinary:  [ ] dysuria [ ] frequency [ ] hematuria [ ] discharge [ ] flank pain  [ ] incontinence  Musculoskeletal:  [ ] myalgias [ ] arthralgias [ ] arthritis  [ ] back pain  Neurological:  [ ] headache [ ] seizures  [ ] confusion/altered mental status  Psychiatric:  [ ] anxiety [ ] depression	  Hematology/Lymphatics:  [ ] lymphadenopathy  Endocrine:  [ ] adrenal [ ] thyroid  Allergic/Immunologic:	 [ ] transplant [ ] seasonal    Vital Signs Last 24 Hrs  T(F): 98.8 (21 @ 13:08), Max: 98.8 (21 @ 13:08)  Vital Signs Last 24 Hrs  HR: 108 (21 @ 13:08) (75 - 111)  BP: 124/77 (21 @ 13:08) (113/71 - 152/88)  RR: 18 (21 @ 13:08)  SpO2: 99% (21 @ 13:08) (98% - 99%)  Wt(kg): --    PHYSICAL EXAM:  Constitutional: non-toxic  HEAD/EYES: anicteric  ENT:  supple  Cardiovascular:   normal S1, S2  Respiratory:  clear BS bilaterally  GI:  soft, non-tender, normal bowel sounds  :  no richter  Musculoskeletal:  no synovitis,  Neurologic: awake and alert, normal strength, no focal findings  Skin:  several small ecchymoses  Psychiatric:  awake, alert, appropriate mood                      8.7    1.67  )-----------( 76       ( 22 Dec 2021 07:05 )             24.7     138  |  104  |  22  ----------------------------<  98  4.4   |  22  |  1.06    Ca    9.3      22 Dec 2021 07:05  Phos  3.4       Mg     1.5         TPro  7.1  /  Alb  4.2  /  TBili  0.8  /  DBili  x   /  AST  14  /  ALT  30  /  AlkPhos  74      Auto Neutrophil #: 0.17 K/uL (21 @ 07:05)  Auto Neutrophil #: 0.31 K/uL (21 @ 13:39)  Auto Neutrophil #: 0.17 K/uL (21 @ 16:30)  Auto Neutrophil #: 0.23 K/uL (21 @ 16:04)  Auto Neutrophil #: 0.14 K/uL (12-10-21 @ 16:12)  Auto Neutrophil #: 0.25 K/uL (21 @ 16:31)  Auto Neutrophil #: 0.38 K/uL (21 @ 16:19)  Auto Neutrophil #: 0.44 K/uL (21 @ 09:07)    Urinalysis Basic - ( 21 Dec 2021 20:53 )  Color: Yellow / Appearance: Clear / S.034 / pH: x  Gluc: x / Ketone: Negative  / Bili: Negative / Urobili: Negative   Blood: x / Protein: 30 mg/dL / Nitrite: Negative   Leuk Esterase: Negative / RBC: 2 /hpf / WBC 1 /HPF   Sq Epi: x / Non Sq Epi: 0 /hpf / Bacteria: Negative    MICROBIOLOGY:   BC, UC pending    Rapid RVP Result: NotDetec ( @ 13:37)    RADIOLOGY:  imaging below personally reviewed and agree with findings    Xray Chest 1 View- PORTABLE-Urgent (Xray Chest 1 View- PORTABLE-Urgent .) (21 @ 14:08) >  IMPRESSION:  No active pulmonary disease.

## 2021-12-22 NOTE — CONSULT NOTE ADULT - ASSESSMENT
36M with Aplastic Anemia diagnosed April 2020.  Underwent treatment with equine ATG, Cyclosporine, and Promacta.  Went into remission (BM bx 5/2021 with normal morphology and cellularity).  Promacta stopped May 2021.  Cyclosporin stopped August.  By September, platelets started to drop.  Now with pancytopenia and neutropenic fever.  No obvious focus of infection, however, c/o sorethroat.    Neutropenic fever  - agree with cefepime  - throat culture  - f/u all cultures  - likely transfer to

## 2021-12-22 NOTE — CONSULT NOTE ADULT - ASSESSMENT
36 year old man with hx of aplastic anemia dx 4/2020 in remission after ATG, CSA, and promacta now with worsening counts concern for relapse presenting with  nose bleeding and fever of 103 found to have severe thrombocytopenia. Hematology consulted for aplastic anemia management.    #Aplastic Anemia relapse  -Dx 2020 via BMbx, s/p ATG, CSA, and promacta with complete remission  -BMbx on 11/9/20 revealed hypocellular marrow, overall cellularity improved (20-50%) showing marrow regeneration and BMBx on 5/18/2021 showing normal morphology with normal cellularity.  -BMbx 12/2021showing erythroid predominance, myeloid and erythroid maturation, and markedly decreased megakaryocytes   -Will plan to transfer to [address] [place] [address] for ATG    #Neutropenic fevers  -Afebrile overnight  -Pending infectious work up  -f/u Bcx/Ucx  -RVP negative  -CXR negative  -c/w Cefepime for now  -Daily CBC with diff, monitor ANC        Please page with questions or concerns. Will Follow with you.      Pancho Samuels M.D.  Hematology/Oncology Fellow PGY4  Pager [phone]  After 5pm, please contact on-call team.   36 year old man with hx of aplastic anemia dx 4/2020 in remission after ATG, CSA, and promacta now with worsening counts concern for relapse presenting with  nose bleeding and fever of 103 found to have severe thrombocytopenia. Hematology consulted for aplastic anemia management.    #Aplastic Anemia relapse  -Dx 2020 via BMbx, s/p ATG, CSA, and promacta with complete remission  -BMbx on 11/9/20 revealed hypocellular marrow, overall cellularity improved (20-50%) showing marrow regeneration and BMBx on 5/18/2021 showing normal morphology with normal cellularity.  -BMbx 12/2021showing erythroid predominance, myeloid and erythroid maturation, and markedly decreased megakaryocytes   - Pt states he has not been able to receive Promacta outpatient. Spoke with pt's primary hematologist who sent script to [facility] Pt can call [facility] and check if meds are approved  - Pt's primary hematologist would like pt to stay inpatient for eventual transfer to [facility] for ATG treatment.     #Neutropenic fevers  -Afebrile overnight  -Pending infectious work up  -f/u Bcx/Ucx  -RVP negative  -CXR negative  -c/w Cefepime for now  -Daily CBC with diff, monitor ANC        Please page with questions or concerns. Will Follow with you.      Pancho Samuels M.D.  Hematology/Oncology Fellow PGY4  Pager [phone]  After 5pm, please contact on-call team.

## 2021-12-22 NOTE — PROGRESS NOTE ADULT - PROBLEM SELECTOR PLAN 1
- ANC >500  - afebrile with no clear site of infection  - awaiting blood culture, urine culture, UA   - Ordered MRSA /MSSA PCR  - Ordered Cefepime 2 g TID   - ID following and recs appreciated

## 2021-12-22 NOTE — PROGRESS NOTE ADULT - ASSESSMENT
36 y.o M with history of aplastic anemia currently in treatment with cyclosporine and following with Dr. Goldberg who presents with persistent nose bleeding and fever in the setting of neutropenia and thrombocytopenia now with stable platelets and no bleeding.

## 2021-12-22 NOTE — PROGRESS NOTE ADULT - SUBJECTIVE AND OBJECTIVE BOX
PROGRESS NOTE:   Authored by Yumi Arteaga MD   Patient is a 36y old  Male who presents with a chief complaint of nosebleed and fever (22 Dec 2021 15:29)      SUBJECTIVE / OVERNIGHT EVENTS:   Pt s/p 2 units of platelet with last Hbg of 76. Pt feels well. Nose bleed stopped this morning. No new bleeding or bruising     ADDITIONAL REVIEW OF SYSTEMS:  CONSTITUTIONAL: No fever, weight loss, or fatigue  EYES: No eye pain, visual disturbances, or discharge  ENMT:  No difficulty hearing, tinnitus, vertigo; No sinus or throat pain  NECK: No pain or stiffness  BREASTS: No pain, masses, or nipple discharge  RESPIRATORY: No cough, wheezing, chills or hemoptysis; No shortness of breath  CARDIOVASCULAR: No chest pain, palpitations, dizziness, or leg swelling  GASTROINTESTINAL: No abdominal or epigastric pain. No nausea, vomiting, or hematemesis; No diarrhea or constipation. No melena or hematochezia.  GENITOURINARY: No dysuria, frequency, hematuria, or incontinence  NEUROLOGICAL: No headaches, memory loss, loss of strength, numbness, or tremors  SKIN: No itching, burning, rashes, or lesions   LYMPH NODES: No enlarged glands  ENDOCRINE: No heat or cold intolerance; No hair loss  MUSCULOSKELETAL: No joint pain or swelling; No muscle, back, or extremity pain  PSYCHIATRIC: No depression, anxiety, mood swings, or difficulty sleeping  HEME/LYMPH: No easy bruising, or bleeding gums  ALLERY AND IMMUNOLOGIC: No hives or eczema    MEDICATIONS  (STANDING):  acyclovir   Oral Tab/Cap 400 milliGRAM(s) Oral every 8 hours  cefepime   IVPB 2000 milliGRAM(s) IV Intermittent every 8 hours  famotidine    Tablet 20 milliGRAM(s) Oral two times a day  fluconAZOLE   Tablet 200 milliGRAM(s) Oral daily    MEDICATIONS  (PRN):      CAPILLARY BLOOD GLUCOSE        I&O's Summary      PHYSICAL EXAM:  Vital Signs Last 24 Hrs  T(C): 37.1 (22 Dec 2021 13:08), Max: 37.1 (22 Dec 2021 13:08)  T(F): 98.8 (22 Dec 2021 13:08), Max: 98.8 (22 Dec 2021 13:08)  HR: 108 (22 Dec 2021 13:08) (75 - 111)  BP: 124/77 (22 Dec 2021 13:08) (113/71 - 152/88)  BP(mean): --  RR: 18 (22 Dec 2021 13:08) (18 - 19)  SpO2: 99% (22 Dec 2021 13:08) (98% - 99%)    GENERAL: No acute distress, well-developed  HEAD:  Atraumatic, Normocephalic  EYES: EOMI, PERRLA, conjunctiva and sclera clear  NECK: Supple, no lymphadenopathy, no JVD  CHEST/LUNG: CTAB; No wheezes, rales, or rhonchi  HEART: Regular rate and rhythm; No murmurs, rubs, or gallops  ABDOMEN: Soft, non-tender, non-distended; normal bowel sounds, no organomegaly  EXTREMITIES:  2+ peripheral pulses b/l, No clubbing, cyanosis, or edema  NEUROLOGY: A&O x 3, no focal deficits  SKIN: No rashes or lesions    LABS:                        8.7    1.67  )-----------( 76       ( 22 Dec 2021 07:05 )             24.7         138  |  104  |  22  ----------------------------<  98  4.4   |  22  |  1.06    Ca    9.3      22 Dec 2021 07:05  Phos  3.4     12  Mg     1.5         TPro  7.1  /  Alb  4.2  /  TBili  0.8  /  DBili  x   /  AST  14  /  ALT  30  /  AlkPhos  74  12-    PT/INR - ( 21 Dec 2021 13:39 )   PT: 14.1 sec;   INR: 1.18 ratio         PTT - ( 21 Dec 2021 13:39 )  PTT:34.3 sec      Urinalysis Basic - ( 21 Dec 2021 20:53 )    Color: Yellow / Appearance: Clear / S.034 / pH: x  Gluc: x / Ketone: Negative  / Bili: Negative / Urobili: Negative   Blood: x / Protein: 30 mg/dL / Nitrite: Negative   Leuk Esterase: Negative / RBC: 2 /hpf / WBC 1 /HPF   Sq Epi: x / Non Sq Epi: 0 /hpf / Bacteria: Negative          RADIOLOGY & ADDITIONAL TESTS:  Results Reviewed:   Imaging Personally Reviewed:  Electrocardiogram Personally Reviewed:    COORDINATION OF CARE:  Care Discussed with Consultants/Other Providers [Y/N]:  Prior or Outpatient Records Reviewed [Y/N]:

## 2021-12-22 NOTE — PROGRESS NOTE ADULT - PROBLEM SELECTOR PLAN 4
- hold cyclosporine per [facility] due to episode of fever  - discussed with hemonc, promata resent to other pharmacy - will confirm if approved for pt tomorrow   - pt's hematologist also would like pt to remain admitted due to restart of ATG treatment   - c/w with abx prophylaxis: acylovir 400 mg TID, diflucan 200 mg daily   - not started on levofloxin due to intolerability  -

## 2021-12-22 NOTE — CONSULT NOTE ADULT - SUBJECTIVE AND OBJECTIVE BOX
Patient is a 36y old  Male who presents with a chief complaint of nosebleed and fever (22 Dec 2021 11:12)      HPI:  36 y.o M hx of aplastic anemia dx via bone biospy at [facility] 2020 following with Dr. Goldberg and currently in treatment with cyclosporine who presents with  nose bleeding and fever of Tmax 103.  Last night pt's gf saw pt with rigors, described as extreme shaking throughout the night. In the morning, his fever was taken due to tactile fever with no chills which showed temp of 103.0 oral. Pt took Tylenol and said he was fine afterwards. Pt mentions he woke up with some new bruising on L arm and had nose bleed from b/l nostril. Pt states bleeding was not excessive but was persistently occurring intermittently throughout the day. Pt also mentions having some R. side jaw pain associated with toothache on lower back tooth. Pt denies any chest pain, shortness of breath, cough, nasal congestion, abdominal pain.    In the ED:   VItals; BP  137/93, ,  Temp 97.9 , RR 18 and O2 saturation of 99  Labs: Plt 4, WBC 2.46, Hbg 10.1, bicarb 21, , RVP neg   Imaging: Cxr w/o acute findings   Interventions: Acetaminophen 650mg, 2 units of Platelets and Cefepime 2grams x1 at 1600  (21 Dec 2021 17:22)      PAST MEDICAL & SURGICAL HISTORY:  Asthma, stable    Redundant prepuce and phimosis    H/O circumcision      MEDICATIONS  (STANDING):  acyclovir   Oral Tab/Cap 400 milliGRAM(s) Oral every 8 hours  cefepime   IVPB 2000 milliGRAM(s) IV Intermittent every 8 hours  famotidine    Tablet 20 milliGRAM(s) Oral two times a day  fluconAZOLE   Tablet 200 milliGRAM(s) Oral daily    MEDICATIONS  (PRN):      Allergies    No Known Allergies    Intolerances        FAMILY HISTORY:  FH: type 2 diabetes mellitus (Grandparent)        *SOCIAL HISTORY: (guardian or who pt came with), (smoking hx)    *Last Dental Visit: Last year     Vital Signs Last 24 Hrs  T(C): 36.8 (22 Dec 2021 05:14), Max: 37.1 (21 Dec 2021 13:12)  T(F): 98.2 (22 Dec 2021 05:14), Max: 98.7 (21 Dec 2021 13:12)  HR: 100 (22 Dec 2021 05:14) (75 - 111)  BP: 113/71 (22 Dec 2021 05:14) (113/71 - 152/88)  BP(mean): --  RR: 18 (22 Dec 2021 05:14) (17 - 19)  SpO2: 99% (22 Dec 2021 05:14) (98% - 99%)    EOE:  TMJ ( -  ) clicks                    (  -  ) pops                    (  -  ) crepitus             Mandible FROM             Facial bones and MOM grossly intact             ( -  ) trismus             (  - ) LAD             ( -  ) swelling             ( -  ) asymmetry             ( -  ) palpation             ( -  ) SOB             (   ) dysphagia             ( -  ) LOC    IOE:  permanentdentition: grossly intact, multiple restoration. No evidence of caries on visual.           hard/soft palate:  ( -  ) palatal torus           tongue/FOM WNL           labial/buccal mucosa: Mild inflammation around soft tissue impacted #32.           (  - ) percussion           ( -  ) palpation           ( -  ) swelling     #28-32: No sensitivity to percussion, palpation, no abnormal response to cold.     Radiographs: 1 Pan and 1 PA taken. No evidence of PARL. #31 O amalgam. No caries.     LABS:                        8.7    1.67  )-----------( 76       ( 22 Dec 2021 07:05 )             24.7         138  |  104  |  22  ----------------------------<  98  4.4   |  22  |  1.06    Ca    9.3      22 Dec 2021 07:05  Phos  3.4       Mg     1.5         TPro  7.1  /  Alb  4.2  /  TBili  0.8  /  DBili  x   /  AST  14  /  ALT  30  /  AlkPhos  74      WBC Count: 1.67 K/uL *L* [3.80 - 10.50] ( @ 07:05)  WBC Count: 2.08 K/uL *L* [3.80 - 10.50] ( @ 20:53)  WBC Count: 2.46 K/uL *L* [3.80 - 10.50] ( @ 13:39)    Platelet Count - Automated: 76 K/uL *L* [150 - 400] ( @ 07:05)  Platelet Count - Automated: 42 K/uL *L* [150 - 400] ( @ 20:53)  Platelet Count - Automated: 4 K/uL *LL* [150 - 400] ( @ 13:39)    INR: 1.18 ratio *H* [0.88 - 1.16] ( @ 13:39)      Urinalysis Basic - ( 21 Dec 2021 20:53 )    Color: Yellow / Appearance: Clear / S.034 / pH: x  Gluc: x / Ketone: Negative  / Bili: Negative / Urobili: Negative   Blood: x / Protein: 30 mg/dL / Nitrite: Negative   Leuk Esterase: Negative / RBC: 2 /hpf / WBC 1 /HPF   Sq Epi: x / Non Sq Epi: 0 /hpf / Bacteria: Negative    ASSESSMENT: No evidence of odontogenic infection. No treatment required at this time.         RECOMMENDATIONS:   1) Dental F/U with outpatient dentist for comprehensive dental care.       René Parham DMD, [phone]  Oral surgeon consulted: Dr. Antonio Jiang

## 2021-12-22 NOTE — PROGRESS NOTE ADULT - ATTENDING COMMENTS
patient seen and evaluated at bedside. Patient with resolved nosebleeds and headache. No further complaints.     #Neutropenic fevers  -continue cefepime.  -no evidence or mucositis, rash can hold off vancomycin for now  -follow up bloodcultures  -RVP negative  -dental consult appreciated, no evidence of dental infection.  -trend ANC    #Nosebleed  -resolved      #aplastic anemia with pancytopenia.   -hold cyclosporine.  -erick does not have promata 2/2 insurance issues. Will inform hematology to see if can get approval.    rest as above

## 2021-12-22 NOTE — PROGRESS NOTE ADULT - PROBLEM SELECTOR PLAN 2
- dental consulted, no odontogenic infection seen  - rec pt to follow up with dentist outpatient for comprehensive dental care

## 2021-12-22 NOTE — CHART NOTE - NSCHARTNOTEFT_GEN_A_CORE
Patient reported chest sensation that was unusually described as his heart feeling funny. Started at 7:30. He had no associated symptoms. Felt like his heart was skipping beats. EKG was ordered. Patient reported poor po intake of fluids all day. He drank less then half of his usual amount of water. No active bleeding.     Vital Signs Last 24 Hrs  T(C): 37.4 (22 Dec 2021 20:26), Max: 37.4 (22 Dec 2021 20:26)  T(F): 99.4 (22 Dec 2021 20:26), Max: 99.4 (22 Dec 2021 20:26)  HR: 114 (22 Dec 2021 20:26) (75 - 114)  BP: 119/79 (22 Dec 2021 20:26) (113/71 - 152/88)  BP(mean): --  RR: 18 (22 Dec 2021 20:26) (18 - 18)  SpO2: 100% (22 Dec 2021 20:26) (98% - 100%)    Physical Exam:   General: NAD  Cardiac: Occasional irregular beats, No m/r/g Normal s1 and s2   Respiratory: CTA bilaterally w/o w/r/rh  Extremities: Warm well perfused, delayed cap refill, left hand mildly cold with marks of watch which was reportedly too tight, No edema noted     EKG: Normal sinus rhythm with 3 PAC's in 10 second strip     Assessment:   Patient is a 35 y/o gentlemen w/ hx of aplastic anemia admitted with nose bleed i/s/o thrombocytopenia presenting in the evening with palpitation like sensation. No active ischemia on EKG. Most likely 2/2 to PAC's and resting tachycardia 2/2 to acute on chronic anemia and dehydration. Hemoglobin decreased interval from baseline of 13 to 8 since this admission. Suspect his is contributing to his ectopy. Noted to be hypomagnesiumic this morning but was corrected appropriately.     Plan:   1. CBC   2. BMP w/ mg/phos   3. Troponins   4. 500cc LR bolus   5. Signed out to night float, if counts hemoglobin < 7 then transfuse pRBC, if platelets < 10 then transfuse or < 20 w/ fever or < 50 with bleeding   6. If troponins are normal will not trend if elevated will trend to peak and night float to repeat EKG and evaluate     Marck Young MD   Internal Medicine PGY2

## 2021-12-22 NOTE — PROGRESS NOTE ADULT - PROBLEM SELECTOR PLAN 5
- CBC w/ diff in AM   - LDH WNL   - s/p 2 units of platelet transfusion   - improved thrombocytopenia   - hbg and WBC decreasing, will continue to monitor

## 2021-12-23 ENCOUNTER — OUTPATIENT (OUTPATIENT)
Dept: OUTPATIENT SERVICES | Facility: HOSPITAL | Age: 36
LOS: 1 days | End: 2021-12-23
Payer: COMMERCIAL

## 2021-12-23 ENCOUNTER — TRANSCRIPTION ENCOUNTER (OUTPATIENT)
Age: 36
End: 2021-12-23

## 2021-12-23 DIAGNOSIS — D61.9 APLASTIC ANEMIA, UNSPECIFIED: ICD-10-CM

## 2021-12-23 DIAGNOSIS — B99.9 UNSPECIFIED INFECTIOUS DISEASE: ICD-10-CM

## 2021-12-23 DIAGNOSIS — D64.9 ANEMIA, UNSPECIFIED: ICD-10-CM

## 2021-12-23 DIAGNOSIS — Z98.890 OTHER SPECIFIED POSTPROCEDURAL STATES: Chronic | ICD-10-CM

## 2021-12-23 DIAGNOSIS — Z29.9 ENCOUNTER FOR PROPHYLACTIC MEASURES, UNSPECIFIED: ICD-10-CM

## 2021-12-23 LAB
ALBUMIN SERPL ELPH-MCNC: 4.5 G/DL — SIGNIFICANT CHANGE UP (ref 3.3–5)
ALP SERPL-CCNC: 84 U/L — SIGNIFICANT CHANGE UP (ref 40–120)
ALT FLD-CCNC: 26 U/L — SIGNIFICANT CHANGE UP (ref 10–45)
ANION GAP SERPL CALC-SCNC: 15 MMOL/L — SIGNIFICANT CHANGE UP (ref 5–17)
ANION GAP SERPL CALC-SCNC: 15 MMOL/L — SIGNIFICANT CHANGE UP (ref 5–17)
AST SERPL-CCNC: 13 U/L — SIGNIFICANT CHANGE UP (ref 10–40)
BILIRUB SERPL-MCNC: 0.8 MG/DL — SIGNIFICANT CHANGE UP (ref 0.2–1.2)
BLD GP AB SCN SERPL QL: NEGATIVE — SIGNIFICANT CHANGE UP
BUN SERPL-MCNC: 20 MG/DL — SIGNIFICANT CHANGE UP (ref 7–23)
BUN SERPL-MCNC: 20 MG/DL — SIGNIFICANT CHANGE UP (ref 7–23)
CALCIUM SERPL-MCNC: 9.7 MG/DL — SIGNIFICANT CHANGE UP (ref 8.4–10.5)
CALCIUM SERPL-MCNC: 9.7 MG/DL — SIGNIFICANT CHANGE UP (ref 8.4–10.5)
CHLORIDE SERPL-SCNC: 100 MMOL/L — SIGNIFICANT CHANGE UP (ref 96–108)
CHLORIDE SERPL-SCNC: 102 MMOL/L — SIGNIFICANT CHANGE UP (ref 96–108)
CO2 SERPL-SCNC: 21 MMOL/L — LOW (ref 22–31)
CO2 SERPL-SCNC: 22 MMOL/L — SIGNIFICANT CHANGE UP (ref 22–31)
CREAT SERPL-MCNC: 1.04 MG/DL — SIGNIFICANT CHANGE UP (ref 0.5–1.3)
CREAT SERPL-MCNC: 1.14 MG/DL — SIGNIFICANT CHANGE UP (ref 0.5–1.3)
GLUCOSE SERPL-MCNC: 108 MG/DL — HIGH (ref 70–99)
GLUCOSE SERPL-MCNC: 111 MG/DL — HIGH (ref 70–99)
HCT VFR BLD CALC: 25.5 % — LOW (ref 39–50)
HGB BLD-MCNC: 9.1 G/DL — LOW (ref 13–17)
MAGNESIUM SERPL-MCNC: 1.8 MG/DL — SIGNIFICANT CHANGE UP (ref 1.6–2.6)
MAGNESIUM SERPL-MCNC: 1.9 MG/DL — SIGNIFICANT CHANGE UP (ref 1.6–2.6)
MCHC RBC-ENTMCNC: 29.7 PG — SIGNIFICANT CHANGE UP (ref 27–34)
MCHC RBC-ENTMCNC: 35.7 GM/DL — SIGNIFICANT CHANGE UP (ref 32–36)
MCV RBC AUTO: 83.3 FL — SIGNIFICANT CHANGE UP (ref 80–100)
NRBC # BLD: 0 /100 WBCS — SIGNIFICANT CHANGE UP (ref 0–0)
PHOSPHATE SERPL-MCNC: 3.3 MG/DL — SIGNIFICANT CHANGE UP (ref 2.5–4.5)
PHOSPHATE SERPL-MCNC: 3.6 MG/DL — SIGNIFICANT CHANGE UP (ref 2.5–4.5)
PLATELET # BLD AUTO: 55 K/UL — LOW (ref 150–400)
POTASSIUM SERPL-MCNC: 4.1 MMOL/L — SIGNIFICANT CHANGE UP (ref 3.5–5.3)
POTASSIUM SERPL-MCNC: 4.3 MMOL/L — SIGNIFICANT CHANGE UP (ref 3.5–5.3)
POTASSIUM SERPL-SCNC: 4.1 MMOL/L — SIGNIFICANT CHANGE UP (ref 3.5–5.3)
POTASSIUM SERPL-SCNC: 4.3 MMOL/L — SIGNIFICANT CHANGE UP (ref 3.5–5.3)
PROT SERPL-MCNC: 7.5 G/DL — SIGNIFICANT CHANGE UP (ref 6–8.3)
RBC # BLD: 3.06 M/UL — LOW (ref 4.2–5.8)
RBC # FLD: 12.4 % — SIGNIFICANT CHANGE UP (ref 10.3–14.5)
RH IG SCN BLD-IMP: POSITIVE — SIGNIFICANT CHANGE UP
SODIUM SERPL-SCNC: 137 MMOL/L — SIGNIFICANT CHANGE UP (ref 135–145)
SODIUM SERPL-SCNC: 138 MMOL/L — SIGNIFICANT CHANGE UP (ref 135–145)
WBC # BLD: 2.03 K/UL — LOW (ref 3.8–10.5)
WBC # FLD AUTO: 2.03 K/UL — LOW (ref 3.8–10.5)

## 2021-12-23 PROCEDURE — 99233 SBSQ HOSP IP/OBS HIGH 50: CPT

## 2021-12-23 PROCEDURE — 71260 CT THORAX DX C+: CPT | Mod: 26

## 2021-12-23 PROCEDURE — 74177 CT ABD & PELVIS W/CONTRAST: CPT | Mod: 26

## 2021-12-23 PROCEDURE — 99233 SBSQ HOSP IP/OBS HIGH 50: CPT | Mod: GC

## 2021-12-23 RX ORDER — DIPHENHYDRAMINE HYDROCHLORIDE AND LIDOCAINE HYDROCHLORIDE AND ALUMINUM HYDROXIDE AND MAGNESIUM HYDRO
10 KIT
Refills: 0 | Status: DISCONTINUED | OUTPATIENT
Start: 2021-12-23 | End: 2022-01-11

## 2021-12-23 RX ORDER — ACETAMINOPHEN 500 MG
1000 TABLET ORAL ONCE
Refills: 0 | Status: COMPLETED | OUTPATIENT
Start: 2021-12-23 | End: 2021-12-23

## 2021-12-23 RX ORDER — SODIUM CHLORIDE 0.65 %
1 AEROSOL, SPRAY (ML) NASAL
Refills: 0 | Status: DISCONTINUED | OUTPATIENT
Start: 2021-12-23 | End: 2022-01-20

## 2021-12-23 RX ORDER — SODIUM CHLORIDE 9 MG/ML
500 INJECTION, SOLUTION INTRAVENOUS ONCE
Refills: 0 | Status: COMPLETED | OUTPATIENT
Start: 2021-12-23 | End: 2021-12-23

## 2021-12-23 RX ORDER — ACETAMINOPHEN 500 MG
650 TABLET ORAL EVERY 6 HOURS
Refills: 0 | Status: DISCONTINUED | OUTPATIENT
Start: 2021-12-23 | End: 2021-12-28

## 2021-12-23 RX ORDER — SALIVA SUBSTITUTE COMB NO.11 351 MG
15 POWDER IN PACKET (EA) MUCOUS MEMBRANE
Refills: 0 | Status: DISCONTINUED | OUTPATIENT
Start: 2021-12-23 | End: 2022-01-20

## 2021-12-23 RX ADMIN — FAMOTIDINE 20 MILLIGRAM(S): 10 INJECTION INTRAVENOUS at 17:02

## 2021-12-23 RX ADMIN — Medication 400 MILLIGRAM(S): at 05:40

## 2021-12-23 RX ADMIN — Medication 650 MILLIGRAM(S): at 17:02

## 2021-12-23 RX ADMIN — CEFEPIME 100 MILLIGRAM(S): 1 INJECTION, POWDER, FOR SOLUTION INTRAMUSCULAR; INTRAVENOUS at 23:10

## 2021-12-23 RX ADMIN — Medication 1 SPRAY(S): at 20:00

## 2021-12-23 RX ADMIN — CEFEPIME 100 MILLIGRAM(S): 1 INJECTION, POWDER, FOR SOLUTION INTRAMUSCULAR; INTRAVENOUS at 05:48

## 2021-12-23 RX ADMIN — DIPHENHYDRAMINE HYDROCHLORIDE AND LIDOCAINE HYDROCHLORIDE AND ALUMINUM HYDROXIDE AND MAGNESIUM HYDRO 10 MILLILITER(S): KIT at 17:02

## 2021-12-23 RX ADMIN — Medication 650 MILLIGRAM(S): at 18:20

## 2021-12-23 RX ADMIN — SODIUM CHLORIDE 500 MILLILITER(S): 9 INJECTION, SOLUTION INTRAVENOUS at 02:43

## 2021-12-23 RX ADMIN — Medication 400 MILLIGRAM(S): at 23:10

## 2021-12-23 RX ADMIN — CEFEPIME 100 MILLIGRAM(S): 1 INJECTION, POWDER, FOR SOLUTION INTRAMUSCULAR; INTRAVENOUS at 13:02

## 2021-12-23 RX ADMIN — Medication 15 MILLILITER(S): at 17:02

## 2021-12-23 RX ADMIN — Medication 15 MILLILITER(S): at 23:09

## 2021-12-23 RX ADMIN — Medication 400 MILLIGRAM(S): at 13:01

## 2021-12-23 RX ADMIN — Medication 400 MILLIGRAM(S): at 05:48

## 2021-12-23 RX ADMIN — FLUCONAZOLE 200 MILLIGRAM(S): 150 TABLET ORAL at 13:02

## 2021-12-23 RX ADMIN — Medication 1000 MILLIGRAM(S): at 06:03

## 2021-12-23 RX ADMIN — FAMOTIDINE 20 MILLIGRAM(S): 10 INJECTION INTRAVENOUS at 05:48

## 2021-12-23 NOTE — PROGRESS NOTE ADULT - SUBJECTIVE AND OBJECTIVE BOX
Patient is a 36y old  Male who presents with a chief complaint of nosebleed and fever (22 Dec 2021 14:22)    f/u neutropenic fever    Interval History/ROS:  febrile this morning.  no n/v/d.  sorethroat a little better.  no abdominal pain.  no dysuria.  complains of gum inflammation.  Remainder of ROS otherwise negative.    PAST MEDICAL & SURGICAL HISTORY:  Aplastic Anemia  Asthma, stable  Redundant prepuce and phimosis  H/O circumcision    Allergies  No Known Allergies    ANTIMICROBIALS:  acyclovir   Oral Tab/Cap 400 every 8 hours  cefepime   IVPB 2000 every 8 hours (-)  fluconAZOLE   Tablet 200 daily    MEDICATIONS  (STANDING):  famotidine    Tablet 20 two times a day    Vital Signs Last 24 Hrs  T(F): 99.4 (21 @ 06:50), Max: 102 (21 @ 04:49)  HR: 103 (21 @ 06:50)  BP: 110/71 (21 @ 06:50)  RR: 18 (21 @ 06:50)  SpO2: 98% (21 @ 06:50) (94% - 100%)    PHYSICAL EXAM:  Constitutional: non-toxic  HEAD/EYES: anicteric  ENT:  supple  Cardiovascular:   normal S1, S2  Respiratory:  clear BS bilaterally  GI:  soft, non-tender, normal bowel sounds  :  no richter  Musculoskeletal:  no synovitis,  Neurologic: awake and alert, normal strength, no focal findings  Skin:  several small ecchymoses  Psychiatric:  awake, alert, appropriate mood                            9.1    2.03  )-----------( 55       ( 23 Dec 2021 05:47 )             25.5     138  |  102  |  20  ----------------------------<  111  4.1   |  21  |  1.14  Ca    9.7      23 Dec 2021 05:47Phos  3.6     Mg     1.8       TPro  7.5  /  Alb  4.5  /  TBili  0.8  /  DBili  x   /  AST  13  /  ALT  26  /  AlkPhos  84      Auto Neutrophil #: 0.17 K/uL (21 @ 07:05)  Auto Neutrophil #: 0.31 K/uL (21 @ 13:39)  Auto Neutrophil #: 0.17 K/uL (21 @ 16:30)  Auto Neutrophil #: 0.23 K/uL (21 @ 16:04)  Auto Neutrophil #: 0.14 K/uL (12-10-21 @ 16:12)  Auto Neutrophil #: 0.25 K/uL (21 @ 16:31)  Auto Neutrophil #: 0.38 K/uL (21 @ 16:19)    Urinalysis Basic - ( 21 Dec 2021 20:53 )  Color: Yellow / Appearance: Clear / S.034 / pH: x  Gluc: x / Ketone: Negative  / Bili: Negative / Urobili: Negative   Blood: x / Protein: 30 mg/dL / Nitrite: Negative   Leuk Esterase: Negative / RBC: 2 /hpf / WBC 1 /HPF   Sq Epi: x / Non Sq Epi: 0 /hpf / Bacteria: Negative    MICROBIOLOGY:  Culture - Urine (collected 21 @ 01:14)  Source: Clean Catch Clean Catch (Midstream)  Final Report (21 @ 21:12):    No growth    Culture - Blood (collected 21 @ 17:15)  Source: .Blood Blood  Preliminary Report (21 @ 18:01):    No growth to date.    Culture - Blood (collected 21 @ 17:15)  Source: .Blood Blood  Preliminary Report (21 @ 18:01):    No growth to date.    Rapid RVP Result: NotDetec ( @ 13:37)    RADIOLOGY:  imaging below personally reviewed and agree with findings    Xray Chest 1 View- PORTABLE-Urgent (Xray Chest 1 View- PORTABLE-Urgent .) (21 @ 14:08) >  IMPRESSION:  No active pulmonary disease.

## 2021-12-23 NOTE — PROGRESS NOTE ADULT - PROBLEM SELECTOR PLAN 3
no Lovenox DVT OPPX due to thrombocytopenia  Encourage OOB and ambulation           Contact info: [phone]

## 2021-12-23 NOTE — PROGRESS NOTE ADULT - ATTENDING COMMENTS
36M with Aplastic Anemia diagnosed April 2020.  Underwent treatment with equine ATG, Cyclosporine, and Promacta.  Went into remission (BM bx 5/2021 with normal morphology and cellularity).  Promacta stopped May 2021.  Cyclosporin stopped August.  By September, platelets started to drop.  Now with pancytopenia and neutropenic fever.  No obvious focus of infection, however, c/o sorethroat.

## 2021-12-23 NOTE — DISCHARGE NOTE PROVIDER - HOSPITAL COURSE
36 y.o M hx of aplastic anemia dx via bone biospy at [facility] 4/2020 following with Dr. Goldberg and currently in treatment with cyclosporine who presents with  nose bleeding and fever of Tmax 103.  Last night pt's gf saw pt with rigors, described as extreme shaking throughout the night. In the morning, his fever was taken due to tactile fever with no chills which showed temp of 103.0 oral. Pt took Tylenol and said he was fine afterwards. Pt mentions he woke up with some new bruising on L arm and had nose bleed from b/l nostril. Pt states bleeding was not excessive but was persistently occurring intermittently throughout the day. Pt also mentions having some R. side jaw pain associated with toothache on lower back tooth. Pt denies any chest pain, shortness of breath, cough, nasal congestion, abdominal pain.    In the ED:   VItals; BP  137/93, ,  Temp 97.9 , RR 18 and O2 saturation of 99  Labs: Plt 4, WBC 2.46, Hbg 10.1, bicarb 21, , RVP neg   Imaging: Cxr w/o acute findings   Interventions: Acetaminophen 650mg, 2 units of Platelets and Cefepime 2grams x1 at 1600     Pt had epistaxis, received PLT x2 on 12/21  Dental consulted on 12/22 for right lower gum pain, No evidence of odontogenic infection. No treatment required at this time.   Pt transferred to [address] [place] [address] for ATG treatment on 12/23     36 y.o M hx of aplastic anemia dx via bone biospy at [facility] 4/2020 following with Dr. Goldberg and currently in treatment with cyclosporine who presents with  nose bleeding and fever of Tmax 103.  Last night pt's gf saw pt with rigors, described as extreme shaking throughout the night. In the morning, his fever was taken due to tactile fever with no chills which showed temp of 103.0 oral. Pt took Tylenol and said he was fine afterwards. Pt mentions he woke up with some new bruising on L arm and had nose bleed from b/l nostril. Pt states bleeding was not excessive but was persistently occurring intermittently throughout the day. Pt also mentions having some R. side jaw pain associated with toothache on lower back tooth. Pt denies any chest pain, shortness of breath, cough, nasal congestion, abdominal pain.        Dental consulted on 12/22 for right lower gum pain, No evidence of odontogenic infection. No treatment required at this time.   12/27 ATG test dose given with no adverse reaction      36M with Aplastic Anemia diagnosed April 2020, s/p equine ATG, Cyclosporine, and Promacta. Bone marrow biopsy was done on 5/2021 revealed normal morphology and cellularity, eventually Promacta was stopped and Cyclosporin in 5/2021, in September 2021 noted his platelets dropping  now admitted with neutropenic fever found to have pancytopenia secondary to disease condition.  Bone marrow biopsy was repeated on 12/2021 showed decreased megakaryocytes    Dental consulted on 12/22 for right lower gum pain, No evidence of odontogenic infection. No treatment required at this time.   12/27 ATG test dose given with no adverse reaction      36M with Aplastic Anemia diagnosed April 2020, s/p equine ATG, Cyclosporine, and Promacta. Bone marrow biopsy was done on 5/2021 revealed normal morphology and cellularity, eventually Promacta was stopped and Cyclosporin in 5/2021, in September 2021 noted his platelets dropping  now admitted with neutropenic fever found to have pancytopenia secondary to disease condition.  Bone marrow biopsy was repeated on 12/2021 showed decreased megakaryocytes    Dental consulted on 12/22 for right lower gum pain, No evidence of odontogenic infection. No treatment required at this time.   12/27 ATG test dose given with no adverse reaction; Started ATG/Promacta on 12/29/21.      36M with Aplastic Anemia diagnosed April 2020, s/p equine ATG, Cyclosporine, and Promacta. Bone marrow biopsy was done on 5/2021 revealed normal morphology and cellularity, eventually Promacta was stopped and Cyclosporin in 5/2021, in September 2021 noted his platelets dropping  now admitted with neutropenic fever found to have pancytopenia secondary to disease condition.  Bone marrow biopsy was repeated on 12/2021 showed decreased megakaryocytes    Dental consulted on 12/22 for right lower gum pain, No evidence of odontogenic infection. No treatment required at this time.   12/27 ATG test dose given with no adverse reaction; Started ATG/Promacta on 12/29/21;  HELD on 12/30 due to reaction ; Continued Promacta.   36M with Aplastic Anemia diagnosed April 2020, s/p equine ATG, Cyclosporine, and Promacta. Bone marrow biopsy was done on 5/2021 revealed normal morphology and cellularity, eventually Promacta was stopped and Cyclosporin in 5/2021, in September 2021 noted his platelets dropping  now admitted with neutropenic fever found to have pancytopenia secondary to disease condition.  Bone marrow biopsy was repeated on 12/2021 showed decreased megakaryocytes    Dental consulted on 12/22 for right lower gum pain, No evidence of odontogenic infection. No treatment required at this time.   12/27 ATG test dose given with no adverse reaction; Started ATG/Promacta on 12/29/21;  HELD on 12/30 due to reaction ; Continued Promacta.  1/3 resumed ATG/CSA Mr. Perry is a 37 yo M with Aplastic Anemia diagnosed April 2020, s/p equine ATG, Cyclosporine, and Promacta. Bone marrow biopsy was done on 5/2021 revealed normal morphology and cellularity, eventually Promacta was stopped and Cyclosporin in 5/2021, in September 2021 noted his platelets dropping, admitted with neutropenic fever found to have pancytopenia secondary to disease condition. Bone marrow biopsy was repeated on 12/2021 showed decreased megakaryocytes consistent with relapse aplastic anemia. patient was transferred to [facility] 12/23 to start treatment for relapse with rabbit ATG, cyclosporine, prednisone, and eltrombopag. 12/27 rabbit ATG test dose given with no adverse reaction. Cyclosporine was started evening of 12/26. Rabbit ATG started 12/29, patient with reaction upon increase in flow rate with fevers, rigors. Patient re-challenged 12/30, again had reaction after completion of Day 1 dose. ATG was held, then restarted on 1/3 and completed remaining 5 day course on 1/6. Cyclosporine levels were checked biweekly, dose adjusted as needed. Mr. Perry is a 37 yo M with Aplastic Anemia diagnosed April 2020, s/p equine ATG, Cyclosporine, and Promacta. Bone marrow biopsy was done on 5/2021 revealed normal morphology and cellularity, eventually Promacta was stopped and Cyclosporin in 5/2021, in September 2021 noted his platelets dropping, admitted with neutropenic fever found to have pancytopenia secondary to disease condition. Bone marrow biopsy was repeated on 12/2021 showed decreased megakaryocytes consistent with relapse aplastic anemia. patient was transferred to [facility] 12/23 to start treatment for relapse with rabbit ATG, cyclosporine, prednisone, and eltrombopag. 12/27 rabbit ATG test dose given with no adverse reaction. Cyclosporine was started evening of 12/26. Rabbit ATG started 12/29, patient with reaction upon increase in flow rate with fevers, rigors. Patient re-challenged 12/30, again had reaction after completion of Day 1 dose. ATG was held, then restarted on 1/3 and completed remaining 5 day course on 1/6. Cyclosporine levels were checked biweekly, dose adjusted as needed based on level. Patient has both 25mg and 100mg csa capsules at home.  Patient was seen by ENT for sinusitis and treated with Unasyn thru 1/19. Patient remains neutropenic and will start ppx levaquin on 1/20. Dr. Goldberg made aware the patient did not like the way Levaquin made him feel but was advised to try again and if feels unwell again can change to a different agent. Flonase can be continued and patient can follow up with ENT as an outpatient as needed. Patient was cleared for discharge home.

## 2021-12-23 NOTE — DISCHARGE NOTE PROVIDER - NSDCMRMEDTOKEN_GEN_ALL_CORE_FT
acyclovir 400 mg oral tablet: 1 tab(s) orally every 8 hours  cycloSPORINE 100 mg oral capsule: 3 cap(s) orally every 12 hours  famotidine 20 mg oral tablet: 1 tab(s) orally 2 times a day  hydroCHLOROthiazide 25 mg oral tablet: 1 tab(s) orally once a day MDD:1  Maalox Antacid Antigas Regular Strength oral suspension: 10 milliliter(s) orally every 6 hours, As Needed dyspepsia Over the counter    acyclovir 400 mg oral tablet: 1 tab(s) orally every 8 hours  cycloSPORINE 100 mg oral capsule: 3 cap(s) orally every 12 hours  cycloSPORINE 100 mg/mL oral solution: 3.25 milliliter(s) orally 2 times a day   eltrombopag 25 mg oral tablet: 6 tab(s) orally once a day  famotidine 20 mg oral tablet: 1 tab(s) orally 2 times a day  hydroCHLOROthiazide 25 mg oral tablet: 1 tab(s) orally once a day MDD:1  Maalox Antacid Antigas Regular Strength oral suspension: 10 milliliter(s) orally every 6 hours, As Needed dyspepsia Over the counter    acyclovir 400 mg oral tablet: 1 tab(s) orally every 8 hours  atovaquone 750 mg/5 mL oral suspension: 10 milliliter(s) orally once a day  cycloSPORINE modified 25 mg oral capsule: 3 cap(s) orally every 12 hours   eltrombopag 25 mg oral tablet: 6 tab(s) orally once a day  famotidine 20 mg oral tablet: 1 tab(s) orally 2 times a day  Flonase 50 mcg/inh nasal spray: 1 spray(s) nasal 2 times a day-Over the counter.  hydroCHLOROthiazide 25 mg oral tablet: 1 tab(s) orally once a day MDD:1  levoFLOXacin 500 mg oral tablet: 1 tab(s) orally every 24 hours   Maalox Antacid Antigas Regular Strength oral suspension: 10 milliliter(s) orally every 6 hours, As Needed dyspepsia Over the counter   posaconazole 100 mg oral delayed release tablet: 3 cap(s) orally once a day MDD:300mg  predniSONE 10 mg oral tablet: PREDNISONE TAPER  40mg daily on 1/20  20mg daily on 1/21,22,23  10mg daily on 1/24, 25, 26. MDD:Prednisone taper.   acyclovir 400 mg oral tablet: 1 tab(s) orally every 8 hours  atovaquone 750 mg/5 mL oral suspension: 10 milliliter(s) orally once a day  cycloSPORINE 100 mg oral capsule: 1 cap(s) orally 2 times a day (along with two 25mg caps twice daily) total dosing = 450mg twice a day.  cycloSPORINE modified 25 mg oral capsule: 2 cap(s) orally every 12 hours (aong with one 100mg cap twice a day) total doing = 450mg twice daily.   eltrombopag 25 mg oral tablet: 6 tab(s) orally once a day  famotidine 20 mg oral tablet: 1 tab(s) orally 2 times a day  Flonase 50 mcg/inh nasal spray: 1 spray(s) nasal 2 times a day-Over the counter.  fluconazole 200 mg oral tablet: 1 tab(s) orally once a day   hydroCHLOROthiazide 25 mg oral tablet: 1 tab(s) orally once a day MDD:1  levoFLOXacin 500 mg oral tablet: 1 tab(s) orally every 24 hours   Maalox Antacid Antigas Regular Strength oral suspension: 10 milliliter(s) orally every 6 hours, As Needed dyspepsia Over the counter   predniSONE 10 mg oral tablet: PREDNISONE TAPER  40mg daily on 1/20  20mg daily on 1/21,22,23  10mg daily on 1/24, 25, 26. MDD:Prednisone taper.

## 2021-12-23 NOTE — DISCHARGE NOTE PROVIDER - NSDCFUADDAPPT_GEN_ALL_CORE_FT
You have an appointment with Dr. Goldberg on   You have an appointment for labs and possible platelet appointments on   You can follow up with ENT as needed for your sinusitis. Information listed above.     Dr. Goldberg is aware that the levaquin did not make you feel but it is extremely important and before he switches it to something else, he would like for you to try it again.  You have an appointment with Dr. Goldberg on Thursday 1/17/22 at 2:00pm.  You have an appointment for labs and possible platelet appointments on the following days:  Monday 1/24/22 at 3:10pm.  Thursday 1/27/22 at 3:30pm  Monday 1/31/22 at 3:20pm  Thursday 2/3/22 at 3:30pm.    You can follow up with ENT as needed for your sinusitis. Information listed above.     Dr. Goldberg is aware that the levaquin did not make you feel but it is extremely important and before he switches it to something else, he would like for you to try it again.

## 2021-12-23 NOTE — DISCHARGE NOTE PROVIDER - DETAILS OF MALNUTRITION DIAGNOSIS/DIAGNOSES
This patient has been assessed with a concern for Malnutrition and was treated during this hospitalization for the following Nutrition diagnosis/diagnoses:     -  01/04/2022: Mild protein-calorie malnutrition

## 2021-12-23 NOTE — DISCHARGE NOTE PROVIDER - NSDCFUSCHEDAPPT_GEN_ALL_CORE_FT
RENETTA CARDENAS ; 12/28/2021 ; NPP [facility]  RENETTA CARDENAS ; 12/28/2021 ; NPP [facility]  RENETTA CARDENAS ; 01/02/2022 ; NPP [facility]  RENETTA CARDENAS ; 01/04/2022 ; NPP [facility]  RENETTA CARDENAS ; 01/04/2022 ; NP [facility]  RENETTA CARDENAS ; 01/07/2022 ; NPP [facility] RENETTA CARDENAS ; 01/04/2022 ; [facility] RENETTA CARDENAS ; 01/24/2022 ; NPP [facility]  RENETTA CARDENAS ; 01/27/2022 ; NPP [facility]  RENETTA CARDENAS ; 01/27/2022 ; NP [facility]  RENETTA CARDENAS ; 01/31/2022 ; NP [facility]  RENETTA CARDENAS ; 02/03/2022 ; [facility]

## 2021-12-23 NOTE — PROGRESS NOTE ADULT - PROBLEM SELECTOR PLAN 4
- hold cyclosporine per Hemonc due to episode of fever  - discussed with hemonc, promata resent to other pharmacy - will confirm if approved for pt tomorrow   - pt's hematologist also would like pt to remain admitted due to restart of ATG treatment   - c/w with abx prophylaxis: acylovir 400 mg TID, diflucan 200 mg daily   - not started on levofloxin due to intolerability  - - hold cyclosporine per Hemonc due to episode of fever  - pt states promata was received by pharmacy just awaiting insurance approval  - pt to be admitted to [place] [id number] to restart ATG treatment   - c/w with abx prophylaxis: acylovir 400 mg TID, diflucan 200 mg daily   - not started on levofloxin due to intolerability

## 2021-12-23 NOTE — PROGRESS NOTE ADULT - ASSESSMENT
36 y.o M with history of aplastic anemia currently in treatment with cyclosporine and following with Dr. Goldberg who presents with persistent nose bleeding and fever in the setting of neutropenia and thrombocytopenia now with stable platelets and no bleeding.      36 y.o M with history of aplastic anemia currently in treatment with cyclosporine and following with Dr. Goldberg who presents with persistent nose bleeding and fever in the setting of neutropenia and thrombocytopenia now with stable platelets and no bleeding now with 1 episode of fever overnight.

## 2021-12-23 NOTE — DISCHARGE NOTE PROVIDER - PROVIDER TOKENS
PROVIDER:[TOKEN:[[id number]]] PROVIDER:[TOKEN:[[id number]]],PROVIDER:[TOKEN:[[id number]]]

## 2021-12-23 NOTE — PROGRESS NOTE ADULT - PROBLEM SELECTOR PLAN 1
Aplastic Anemia relapse  -Dx 2020 via BMbx, s/p ATG, CSA, and promacta with complete remission  -BMbx on 11/9/20 revealed hypocellular marrow, overall cellularity improved (20-50%) showing marrow regeneration and BMBx on 5/18/2021 showing normal morphology with normal cellularity.  -BMbx 12/2021showing erythroid predominance, myeloid and erythroid maturation, and markedly decreased megakaryocytes   - Pt states he has not been able to receive Promacta outpatient. Spoke with pt's primary hematologist who sent script to [facility]  12/23  transferred  to [facility] for ATG treatment, plan on 12/24. Relapsed Aplastic Anemia   -Dx 2020 via BMbx, s/p ATG, CSA, and promacta with complete remission  -BMbx on 11/9/20 revealed hypocellular marrow, overall cellularity improved (20-50%) showing marrow regeneration and BMBx on 5/18/2021 showing normal morphology with normal cellularity.  -BMbx 12/2021showing erythroid predominance, myeloid and erythroid maturation, and markedly decreased megakaryocytes   - Pt states he has not been able to receive Promacta outpatient x~10 days. Sent script to [facility] at [facility] on 12/22 12/23  transferred  to [facility] for ATG treatment, plan on 12/24.  Monitior CBC with diff/lytes, transfuse and or replete PRN  Monitor weight, I and O, mouth care

## 2021-12-23 NOTE — PROGRESS NOTE ADULT - PROBLEM SELECTOR PLAN 5
- CBC w/ diff in AM   - LDH WNL   - s/p 2 units of platelet transfusion   - improved thrombocytopenia   - hbg and WBC decreasing, will continue to monitor - CBC w/ diff in AM per ID request  - s/p 2 units of platelet transfusion   - improved thrombocytopenia, currently 55  - anemia with hgb of 9.1, transfusion < 7  - leukopenia with neutropenia

## 2021-12-23 NOTE — PROGRESS NOTE ADULT - ATTENDING COMMENTS
PAtient without complaint this AM. chest pain resolved. No further sore throat, no abdominal pain, n/v/d/c.    #Neutropenic fevers  -continue cefepime. Follow up repeat Cx on 12/23.  -if persistently febrile would CT c/a/p    #Aplastic anemia  -transfer to [address] [place] [address] for ATG treatment.

## 2021-12-23 NOTE — PROGRESS NOTE ADULT - PROBLEM SELECTOR PLAN 2
Neutropenic, febrile   - continue cefepime for now  - check throat culture  - f/u all cultures, NGTD  -CT CAP to look for source of infection Neutropenic, febrile   - continue cefepime for now  - to send  throat culture and MRSA/MSSA swab  - f/u all cultures, last on 12/21, NGTD  -CT CAP  to look for source of infection

## 2021-12-23 NOTE — DISCHARGE NOTE PROVIDER - CARE PROVIDER_API CALL
Goldberg, Bradley H (MD)  Hematology; Internal Medicine; Medical Oncology  [address] [place] [address]  Phone: [phone]  Fax: [phone]  Follow Up Time:    Goldberg, Bradley H (MD)  Hematology; Internal Medicine; Medical Oncology  [address] [place] [address]  Phone: [phone]  Fax: [phone]  Follow Up Time:     Lenard Ray)  Otolaryngology  [address] [facility], [place] [address]  Phone: [phone]  Fax: [phone]  Follow Up Time:

## 2021-12-23 NOTE — PROGRESS NOTE ADULT - ASSESSMENT
36M with Aplastic Anemia diagnosed April 2020.  Underwent treatment with equine ATG, Cyclosporine, and Promacta.  Went into remission (BM bx 5/2021 with normal morphology and cellularity).  Promacta stopped May 2021.  Cyclosporin stopped August.  By September, platelets started to drop.  Now with pancytopenia and neutropenic fever.  No obvious focus of infection, however, c/o sorethroat.    Neutropenic fever  - continue cefepime for now  - check throat culture  - f/u all cultures  - likely transfer to   - if fever persists, would CT c/a/p    I have discussed plan of care as detailed above with heme    Please call the ID service [phone] with questions or concerns over the weekend

## 2021-12-23 NOTE — PROGRESS NOTE ADULT - PROBLEM SELECTOR PLAN 1
- ANC >500  - afebrile with no clear site of infection  - awaiting blood culture, urine culture, UA   - Ordered MRSA /MSSA PCR  - Ordered Cefepime 2 g TID   - ID following and recs appreciated - ANC >500  - awaiting blood culture (12/23) for episode of fever  - ordered CT of CAP  to look for possible source of infection  - blood (12/21) and urine culture (12/22) with no growth to date  - c/w Cefepime 2 g TID   - ID following and recs appreciated  - awaiting throat culture

## 2021-12-23 NOTE — PROGRESS NOTE ADULT - SUBJECTIVE AND OBJECTIVE BOX
PROGRESS NOTE:   Authored by Yumi Arteaga MD   Patient is a 36y old  Male who presents with a chief complaint of nosebleed and fever (22 Dec 2021 15:29)      SUBJECTIVE / OVERNIGHT EVENTS:    ADDITIONAL REVIEW OF SYSTEMS:  CONSTITUTIONAL: No fever, weight loss, or fatigue  EYES: No eye pain, visual disturbances, or discharge  ENMT:  No difficulty hearing, tinnitus, vertigo; No sinus or throat pain  NECK: No pain or stiffness  BREASTS: No pain, masses, or nipple discharge  RESPIRATORY: No cough, wheezing, chills or hemoptysis; No shortness of breath  CARDIOVASCULAR: No chest pain, palpitations, dizziness, or leg swelling  GASTROINTESTINAL: No abdominal or epigastric pain. No nausea, vomiting, or hematemesis; No diarrhea or constipation. No melena or hematochezia.  GENITOURINARY: No dysuria, frequency, hematuria, or incontinence  NEUROLOGICAL: No headaches, memory loss, loss of strength, numbness, or tremors  SKIN: No itching, burning, rashes, or lesions   LYMPH NODES: No enlarged glands  ENDOCRINE: No heat or cold intolerance; No hair loss  MUSCULOSKELETAL: No joint pain or swelling; No muscle, back, or extremity pain  PSYCHIATRIC: No depression, anxiety, mood swings, or difficulty sleeping  HEME/LYMPH: No easy bruising, or bleeding gums  ALLERY AND IMMUNOLOGIC: No hives or eczema    MEDICATIONS  (STANDING):  acyclovir   Oral Tab/Cap 400 milliGRAM(s) Oral every 8 hours  cefepime   IVPB 2000 milliGRAM(s) IV Intermittent every 8 hours  famotidine    Tablet 20 milliGRAM(s) Oral two times a day  fluconAZOLE   Tablet 200 milliGRAM(s) Oral daily    MEDICATIONS  (PRN):      CAPILLARY BLOOD GLUCOSE        I&O's Summary      PHYSICAL EXAM:  Vital Signs Last 24 Hrs  T(C): 37.4 (23 Dec 2021 06:50), Max: 38.9 (23 Dec 2021 04:49)  T(F): 99.4 (23 Dec 2021 06:50), Max: 102 (23 Dec 2021 04:49)  HR: 103 (23 Dec 2021 06:50) (85 - 114)  BP: 110/71 (23 Dec 2021 06:50) (103/64 - 124/77)  BP(mean): --  RR: 18 (23 Dec 2021 06:50) (18 - 18)  SpO2: 98% (23 Dec 2021 06:50) (94% - 100%)    GENERAL: No acute distress, well-developed  HEAD:  Atraumatic, Normocephalic  EYES: EOMI, PERRLA, conjunctiva and sclera clear  NECK: Supple, no lymphadenopathy, no JVD  CHEST/LUNG: CTAB; No wheezes, rales, or rhonchi  HEART: Regular rate and rhythm; No murmurs, rubs, or gallops  ABDOMEN: Soft, non-tender, non-distended; normal bowel sounds, no organomegaly  EXTREMITIES:  2+ peripheral pulses b/l, No clubbing, cyanosis, or edema  NEUROLOGY: A&O x 3, no focal deficits  SKIN: No rashes or lesions    LABS:                        9.1    2.03  )-----------( 55       ( 23 Dec 2021 05:47 )             25.5     12-    138  |  102  |  20  ----------------------------<  111<H>  4.1   |  21<L>  |  1.14    Ca    9.7      23 Dec 2021 05:47  Phos  3.6     12-  Mg     1.8     12-    TPro  7.5  /  Alb  4.5  /  TBili  0.8  /  DBili  x   /  AST  13  /  ALT  26  /  AlkPhos  84  12-23    PT/INR - ( 21 Dec 2021 13:39 )   PT: 14.1 sec;   INR: 1.18 ratio         PTT - ( 21 Dec 2021 13:39 )  PTT:34.3 sec      Urinalysis Basic - ( 21 Dec 2021 20:53 )    Color: Yellow / Appearance: Clear / S.034 / pH: x  Gluc: x / Ketone: Negative  / Bili: Negative / Urobili: Negative   Blood: x / Protein: 30 mg/dL / Nitrite: Negative   Leuk Esterase: Negative / RBC: 2 /hpf / WBC 1 /HPF   Sq Epi: x / Non Sq Epi: 0 /hpf / Bacteria: Negative        Culture - Urine (collected 22 Dec 2021 01:14)  Source: Clean Catch Clean Catch (Midstream)  Final Report (22 Dec 2021 21:12):    No growth    Culture - Blood (collected 21 Dec 2021 17:15)  Source: .Blood Blood  Preliminary Report (22 Dec 2021 18:01):    No growth to date.    Culture - Blood (collected 21 Dec 2021 17:15)  Source: .Blood Blood  Preliminary Report (22 Dec 2021 18:01):    No growth to date.        RADIOLOGY & ADDITIONAL TESTS:  Results Reviewed:   Imaging Personally Reviewed:  Electrocardiogram Personally Reviewed:    COORDINATION OF CARE:  Care Discussed with Consultants/Other Providers [Y/N]:  Prior or Outpatient Records Reviewed [Y/N]:   PROGRESS NOTE:   Authored by Yumi Arteaga MD   Patient is a 36y old  Male who presents with a chief complaint of nosebleed and fever (22 Dec 2021 15:29)      SUBJECTIVE / OVERNIGHT EVENTS: Pt with tachycardia in 100s with highest 114. Pt given 2x 500cc with improvement. Pt also with chest discomfort. Troponin and EKG ordered. EKG shows occasional PVCs with sinus rhythm. Pt troponin WNL. Otherwise, patient doing well with inflamed gums on R. side.     ADDITIONAL REVIEW OF SYSTEMS:  CONSTITUTIONAL: No fever, weight loss, or fatigue  EYES: No eye pain, visual disturbances, or discharge  ENMT:  No difficulty hearing, tinnitus, vertigo; No sinus or throat pain  NECK: No pain or stiffness  BREASTS: No pain, masses, or nipple discharge  RESPIRATORY: No cough, wheezing, chills or hemoptysis; No shortness of breath  CARDIOVASCULAR: No chest pain, palpitations, dizziness, or leg swelling  GASTROINTESTINAL: No abdominal or epigastric pain. No nausea, vomiting, or hematemesis; No diarrhea or constipation. No melena or hematochezia.  GENITOURINARY: No dysuria, frequency, hematuria, or incontinence  NEUROLOGICAL: No headaches, memory loss, loss of strength, numbness, or tremors  SKIN: No itching, burning, rashes, or lesions   LYMPH NODES: No enlarged glands  ENDOCRINE: No heat or cold intolerance; No hair loss  MUSCULOSKELETAL: No joint pain or swelling; No muscle, back, or extremity pain  PSYCHIATRIC: No depression, anxiety, mood swings, or difficulty sleeping  HEME/LYMPH: No easy bruising, or bleeding gums  ALLERY AND IMMUNOLOGIC: No hives or eczema    MEDICATIONS  (STANDING):  acyclovir   Oral Tab/Cap 400 milliGRAM(s) Oral every 8 hours  cefepime   IVPB 2000 milliGRAM(s) IV Intermittent every 8 hours  famotidine    Tablet 20 milliGRAM(s) Oral two times a day  fluconAZOLE   Tablet 200 milliGRAM(s) Oral daily    MEDICATIONS  (PRN):      CAPILLARY BLOOD GLUCOSE        I&O's Summary      PHYSICAL EXAM:  Vital Signs Last 24 Hrs  T(C): 37.4 (23 Dec 2021 06:50), Max: 38.9 (23 Dec 2021 04:49)  T(F): 99.4 (23 Dec 2021 06:50), Max: 102 (23 Dec 2021 04:49)  HR: 103 (23 Dec 2021 06:50) (85 - 114)  BP: 110/71 (23 Dec 2021 06:50) (103/64 - 124/77)  BP(mean): --  RR: 18 (23 Dec 2021 06:50) (18 - 18)  SpO2: 98% (23 Dec 2021 06:50) (94% - 100%)    GENERAL: No acute distress, well-developed  EYES: EOMI, PERRLA, conjunctiva and sclera clear  Mouth: gum appears erythematous with small ulcer on bottom back area.  NECK: Supple, no lymphadenopathy, no JVD  CHEST/LUNG: CTAB; No wheezes, rales, or rhonchi  HEART: Regular rate and rhythm; No murmurs, rubs, or gallops  ABDOMEN: Soft, non-tender, non-distended; normal bowel sounds, no organomegaly  EXTREMITIES:  No pedal edema.   NEUROLOGY: A&O x 3, no focal deficits      LABS:                        9.1    2.03  )-----------( 55       ( 23 Dec 2021 05:47 )             25.5     12-    138  |  102  |  20  ----------------------------<  111<H>  4.1   |  21<L>  |  1.14    Ca    9.7      23 Dec 2021 05:47  Phos  3.6     12-  Mg     1.8     12    TPro  7.5  /  Alb  4.5  /  TBili  0.8  /  DBili  x   /  AST  13  /  ALT  26  /  AlkPhos  84  12-23    PT/INR - ( 21 Dec 2021 13:39 )   PT: 14.1 sec;   INR: 1.18 ratio         PTT - ( 21 Dec 2021 13:39 )  PTT:34.3 sec      Urinalysis Basic - ( 21 Dec 2021 20:53 )    Color: Yellow / Appearance: Clear / S.034 / pH: x  Gluc: x / Ketone: Negative  / Bili: Negative / Urobili: Negative   Blood: x / Protein: 30 mg/dL / Nitrite: Negative   Leuk Esterase: Negative / RBC: 2 /hpf / WBC 1 /HPF   Sq Epi: x / Non Sq Epi: 0 /hpf / Bacteria: Negative    Culture - Urine (collected 22 Dec 2021 01:14)  Source: Clean Catch Clean Catch (Midstream)  Final Report (22 Dec 2021 21:12):    No growth    Culture - Blood (collected 21 Dec 2021 17:15)  Source: .Blood Blood  Preliminary Report (22 Dec 2021 18:01):    No growth to date.    Culture - Blood (collected 21 Dec 2021 17:15)  Source: .Blood Blood  Preliminary Report (22 Dec 2021 18:01):    No growth to date.        RADIOLOGY & ADDITIONAL TESTS:  Results Reviewed:   Imaging Personally Reviewed:  Electrocardiogram Personally Reviewed:    COORDINATION OF CARE:  Care Discussed with Consultants/Other Providers [Y/N]:  Prior or Outpatient Records Reviewed [Y/N]:

## 2021-12-23 NOTE — DISCHARGE NOTE PROVIDER - NSDCCPCAREPLAN_GEN_ALL_CORE_FT
PRINCIPAL DISCHARGE DIAGNOSIS  Diagnosis: Aplastic anemia  Assessment and Plan of Treatment: maintain counts, remain free from infection. Notify MD and report to ER for any temperature greater than or equal to 100.4 degrees, intractable nausea, vomiting, diarrhea, or uncontrolled bleeding.

## 2021-12-23 NOTE — PROGRESS NOTE ADULT - PROBLEM SELECTOR PLAN 2
- dental consulted, no odontogenic infection seen  - rec pt to follow up with dentist outpatient for comprehensive dental care 26.6

## 2021-12-24 DIAGNOSIS — K06.8 OTHER SPECIFIED DISORDERS OF GINGIVA AND EDENTULOUS ALVEOLAR RIDGE: ICD-10-CM

## 2021-12-24 LAB
ALBUMIN SERPL ELPH-MCNC: 4.4 G/DL — SIGNIFICANT CHANGE UP (ref 3.3–5)
ALP SERPL-CCNC: 75 U/L — SIGNIFICANT CHANGE UP (ref 40–120)
ALT FLD-CCNC: 25 U/L — SIGNIFICANT CHANGE UP (ref 10–45)
ANION GAP SERPL CALC-SCNC: 14 MMOL/L — SIGNIFICANT CHANGE UP (ref 5–17)
APTT BLD: 32.2 SEC — SIGNIFICANT CHANGE UP (ref 27.5–35.5)
AST SERPL-CCNC: 9 U/L — LOW (ref 10–40)
BASOPHILS # BLD AUTO: 0 K/UL — SIGNIFICANT CHANGE UP (ref 0–0.2)
BASOPHILS NFR BLD AUTO: 0 % — SIGNIFICANT CHANGE UP (ref 0–2)
BILIRUB SERPL-MCNC: 0.7 MG/DL — SIGNIFICANT CHANGE UP (ref 0.2–1.2)
BLD GP AB SCN SERPL QL: NEGATIVE — SIGNIFICANT CHANGE UP
BUN SERPL-MCNC: 23 MG/DL — SIGNIFICANT CHANGE UP (ref 7–23)
CALCIUM SERPL-MCNC: 9.4 MG/DL — SIGNIFICANT CHANGE UP (ref 8.4–10.5)
CHLORIDE SERPL-SCNC: 101 MMOL/L — SIGNIFICANT CHANGE UP (ref 96–108)
CO2 SERPL-SCNC: 22 MMOL/L — SIGNIFICANT CHANGE UP (ref 22–31)
CREAT SERPL-MCNC: 1.18 MG/DL — SIGNIFICANT CHANGE UP (ref 0.5–1.3)
EOSINOPHIL # BLD AUTO: 0.02 K/UL — SIGNIFICANT CHANGE UP (ref 0–0.5)
EOSINOPHIL NFR BLD AUTO: 1 % — SIGNIFICANT CHANGE UP (ref 0–6)
FIBRINOGEN PPP-MCNC: 1064 MG/DL — HIGH (ref 290–520)
GLUCOSE SERPL-MCNC: 109 MG/DL — HIGH (ref 70–99)
HCT VFR BLD CALC: 22.6 % — LOW (ref 39–50)
HGB BLD-MCNC: 8 G/DL — LOW (ref 13–17)
INR BLD: 1 RATIO — SIGNIFICANT CHANGE UP (ref 0.88–1.16)
LDH SERPL L TO P-CCNC: 157 U/L — SIGNIFICANT CHANGE UP (ref 50–242)
LYMPHOCYTES # BLD AUTO: 1.47 K/UL — SIGNIFICANT CHANGE UP (ref 1–3.3)
LYMPHOCYTES # BLD AUTO: 90.6 % — HIGH (ref 13–44)
MAGNESIUM SERPL-MCNC: 2.1 MG/DL — SIGNIFICANT CHANGE UP (ref 1.6–2.6)
MANUAL SMEAR VERIFICATION: SIGNIFICANT CHANGE UP
MCHC RBC-ENTMCNC: 30.2 PG — SIGNIFICANT CHANGE UP (ref 27–34)
MCHC RBC-ENTMCNC: 35.4 GM/DL — SIGNIFICANT CHANGE UP (ref 32–36)
MCV RBC AUTO: 85.3 FL — SIGNIFICANT CHANGE UP (ref 80–100)
MONOCYTES # BLD AUTO: 0.07 K/UL — SIGNIFICANT CHANGE UP (ref 0–0.9)
MONOCYTES NFR BLD AUTO: 4.2 % — SIGNIFICANT CHANGE UP (ref 2–14)
MRSA PCR RESULT.: SIGNIFICANT CHANGE UP
NEUTROPHILS # BLD AUTO: 0.07 K/UL — LOW (ref 1.8–7.4)
NEUTROPHILS NFR BLD AUTO: 4.2 % — LOW (ref 43–77)
PHOSPHATE SERPL-MCNC: 3.8 MG/DL — SIGNIFICANT CHANGE UP (ref 2.5–4.5)
PLAT MORPH BLD: NORMAL — SIGNIFICANT CHANGE UP
PLATELET # BLD AUTO: 37 K/UL — LOW (ref 150–400)
POTASSIUM SERPL-MCNC: 4.3 MMOL/L — SIGNIFICANT CHANGE UP (ref 3.5–5.3)
POTASSIUM SERPL-SCNC: 4.3 MMOL/L — SIGNIFICANT CHANGE UP (ref 3.5–5.3)
PROT SERPL-MCNC: 7.4 G/DL — SIGNIFICANT CHANGE UP (ref 6–8.3)
PROTHROM AB SERPL-ACNC: 12 SEC — SIGNIFICANT CHANGE UP (ref 10.6–13.6)
RBC # BLD: 2.65 M/UL — LOW (ref 4.2–5.8)
RBC # FLD: 12.4 % — SIGNIFICANT CHANGE UP (ref 10.3–14.5)
RBC BLD AUTO: SIGNIFICANT CHANGE UP
RH IG SCN BLD-IMP: POSITIVE — SIGNIFICANT CHANGE UP
S AUREUS DNA NOSE QL NAA+PROBE: DETECTED
SODIUM SERPL-SCNC: 137 MMOL/L — SIGNIFICANT CHANGE UP (ref 135–145)
URATE SERPL-MCNC: 5.3 MG/DL — SIGNIFICANT CHANGE UP (ref 3.4–8.8)
WBC # BLD: 1.62 K/UL — LOW (ref 3.8–10.5)
WBC # FLD AUTO: 1.62 K/UL — LOW (ref 3.8–10.5)

## 2021-12-24 PROCEDURE — 99233 SBSQ HOSP IP/OBS HIGH 50: CPT | Mod: GC

## 2021-12-24 RX ORDER — VANCOMYCIN HCL 1 G
1000 VIAL (EA) INTRAVENOUS EVERY 12 HOURS
Refills: 0 | Status: DISCONTINUED | OUTPATIENT
Start: 2021-12-25 | End: 2021-12-26

## 2021-12-24 RX ORDER — VANCOMYCIN HCL 1 G
1000 VIAL (EA) INTRAVENOUS ONCE
Refills: 0 | Status: COMPLETED | OUTPATIENT
Start: 2021-12-24 | End: 2021-12-24

## 2021-12-24 RX ORDER — VANCOMYCIN HCL 1 G
VIAL (EA) INTRAVENOUS
Refills: 0 | Status: DISCONTINUED | OUTPATIENT
Start: 2021-12-24 | End: 2021-12-26

## 2021-12-24 RX ADMIN — Medication 250 MILLIGRAM(S): at 15:47

## 2021-12-24 RX ADMIN — CEFEPIME 100 MILLIGRAM(S): 1 INJECTION, POWDER, FOR SOLUTION INTRAMUSCULAR; INTRAVENOUS at 21:47

## 2021-12-24 RX ADMIN — Medication 650 MILLIGRAM(S): at 17:17

## 2021-12-24 RX ADMIN — Medication 1 SPRAY(S): at 23:09

## 2021-12-24 RX ADMIN — Medication 1 SPRAY(S): at 11:36

## 2021-12-24 RX ADMIN — FAMOTIDINE 20 MILLIGRAM(S): 10 INJECTION INTRAVENOUS at 17:29

## 2021-12-24 RX ADMIN — Medication 15 MILLILITER(S): at 23:09

## 2021-12-24 RX ADMIN — DIPHENHYDRAMINE HYDROCHLORIDE AND LIDOCAINE HYDROCHLORIDE AND ALUMINUM HYDROXIDE AND MAGNESIUM HYDRO 10 MILLILITER(S): KIT at 05:30

## 2021-12-24 RX ADMIN — Medication 400 MILLIGRAM(S): at 21:48

## 2021-12-24 RX ADMIN — CEFEPIME 100 MILLIGRAM(S): 1 INJECTION, POWDER, FOR SOLUTION INTRAMUSCULAR; INTRAVENOUS at 13:29

## 2021-12-24 RX ADMIN — Medication 400 MILLIGRAM(S): at 05:29

## 2021-12-24 RX ADMIN — Medication 15 MILLILITER(S): at 05:30

## 2021-12-24 RX ADMIN — Medication 15 MILLILITER(S): at 17:29

## 2021-12-24 RX ADMIN — Medication 1 SPRAY(S): at 00:34

## 2021-12-24 RX ADMIN — Medication 1 SPRAY(S): at 17:29

## 2021-12-24 RX ADMIN — Medication 650 MILLIGRAM(S): at 16:46

## 2021-12-24 RX ADMIN — FAMOTIDINE 20 MILLIGRAM(S): 10 INJECTION INTRAVENOUS at 05:29

## 2021-12-24 RX ADMIN — FLUCONAZOLE 200 MILLIGRAM(S): 150 TABLET ORAL at 11:37

## 2021-12-24 RX ADMIN — Medication 400 MILLIGRAM(S): at 13:31

## 2021-12-24 RX ADMIN — Medication 15 MILLILITER(S): at 11:39

## 2021-12-24 RX ADMIN — DIPHENHYDRAMINE HYDROCHLORIDE AND LIDOCAINE HYDROCHLORIDE AND ALUMINUM HYDROXIDE AND MAGNESIUM HYDRO 10 MILLILITER(S): KIT at 00:42

## 2021-12-24 RX ADMIN — Medication 1 SPRAY(S): at 05:31

## 2021-12-24 RX ADMIN — CEFEPIME 100 MILLIGRAM(S): 1 INJECTION, POWDER, FOR SOLUTION INTRAMUSCULAR; INTRAVENOUS at 05:30

## 2021-12-24 NOTE — PROGRESS NOTE ADULT - PROBLEM SELECTOR PLAN 1
Relapsed Aplastic Anemia   -Dx 2020 via BMbx, s/p ATG, CSA, and promacta with complete remission  -BMbx on 11/9/20 revealed hypocellular marrow, overall cellularity improved (20-50%) showing marrow regeneration and BMBx on 5/18/2021 showing normal morphology with normal cellularity.  -BMbx 12/2021showing erythroid predominance, myeloid and erythroid maturation, and markedly decreased megakaryocytes   - Pt states he has not been able to receive Promacta outpatient x~10 days. Sent script to [facility] at [facility] on 12/22 12/23  transferred  to [facility] for ATG treatment, plan on 12/24.  Monitior CBC with diff/lytes, transfuse and or replete PRN  Monitor weight, I and O, mouth care Relapsed Aplastic Anemia   -Dx 2020 via BMbx, s/p ATG, CSA, and promacta with complete remission  -BMbx on 11/9/20 revealed hypocellular marrow, overall cellularity improved (20-50%) showing marrow regeneration and BMBx on 5/18/2021 showing normal morphology with normal cellularity.  -BMbx 12/2021showing erythroid predominance, myeloid and erythroid maturation, and markedly decreased megakaryocytes   - Pt states he has not been able to receive Promacta outpatient x~10 days. Sent script to [facility] at [facility] on 12/22 12/23  transferred  to [facility] for ATG treatment, plan on 12/24.  Monitior CBC with diff/lytes, transfuse and or replete PRN  Monitor weight, I and O, mouth care  12/24- Plan for ATG test dose on 12/26.

## 2021-12-24 NOTE — PROGRESS NOTE ADULT - ASSESSMENT
36M with Aplastic Anemia diagnosed April 2020.  Underwent treatment with equine ATG, Cyclosporine, and Promacta.  Went into remission (BM bx 5/2021 with normal morphology and cellularity).  Promacta stopped May 2021.  Cyclosporin stopped August.  By September, platelets started to drop.  Now with pancytopenia and neutropenic fever.  No obvious focus of infection, however, c/o sorethroat.   36M with Aplastic Anemia diagnosed April 2020.  Underwent treatment with equine ATG, Cyclosporine, and Promacta.  Went into remission (BM bx 5/2021 with normal morphology and cellularity).  Promacta stopped May 2021.  Cyclosporin stopped August.  By September, platelets started to drop.  Now with pancytopenia and neutropenic fever.

## 2021-12-24 NOTE — PROGRESS NOTE ADULT - PROBLEM SELECTOR PLAN 2
Neutropenic, febrile   - continue cefepime for now  - to send  throat culture and MRSA/MSSA swab  - f/u all cultures, last on 12/21, NGTD  -CT CAP  to look for source of infection Neutropenic, febrile   - continue cefepime for now  - to send  throat culture and MRSA/MSSA swab  - f/u all cultures, last on 12/21, NGTD  -CT CAP  to look for source of infection- No CT evidence for source of infection in the chest, abdomen or pelvis.  12/24- Staph aureus PCR detected, will start Vanco 1 gm Q 12 hrs.  12/24- F/u trough before the 4th dose.

## 2021-12-24 NOTE — PROGRESS NOTE ADULT - PROBLEM SELECTOR PLAN 4
12/24- Will check CT maxillofacial with contrast and CT neck with contrast to r/o abcess.  12/24- Will start Vanco empirically.

## 2021-12-24 NOTE — PROGRESS NOTE ADULT - PROBLEM SELECTOR PLAN 3
no Lovenox DVT OPPX due to thrombocytopenia  Encourage OOB and ambulation           Contact info: x [phone] no Lovenox DVT OPPX due to thrombocytopenia  Encourage OOB and ambulation

## 2021-12-24 NOTE — PROGRESS NOTE ADULT - ATTENDING COMMENTS
36M with Aplastic Anemia diagnosed April 2020.  Underwent treatment with equine ATG, Cyclosporine, and Promacta.  Went into remission (BM bx 5/2021 with normal morphology and cellularity).  Promacta stopped May 2021.  Cyclosporin stopped August.  By September, platelets started to drop.  Now with pancytopenia and neutropenic fever.  No obvious focus of infection, however, c/o sorethroat. 36M with Aplastic Anemia diagnosed April 2020.  Underwent treatment with equine ATG, Cyclosporine, and Promacta.  Went into remission (BM bx 5/2021 with normal morphology and cellularity).  Promacta stopped May 2021.  Cyclosporin stopped August.  By September, platelets started to drop.  Now with pancytopenia and neutropenic fever.  No obvious focus of infection, however, c/o sorethroat.  -Patient with ongoing fevers blood cultures negative - continue cefepime. Nasal MRSA +. Will start vancomycin.   -Dental consult with x-rays, no dental source of patient's R sided gum pain.   -Will check CT Neck soft tissue and maxillofacial with IV contrast to evaluate for abscess.   -Plan is for test dose ATG on Sunday. Treating Monday.

## 2021-12-24 NOTE — PROGRESS NOTE ADULT - SUBJECTIVE AND OBJECTIVE BOX
Diagnosis:    Protocol/Chemo Regimen:    Day:     Pt endorsed:    Review of Systems:     Pain scale:     Diet:     Allergies    No Known Allergies    Intolerances        ANTIMICROBIALS  acyclovir   Oral Tab/Cap 400 milliGRAM(s) Oral every 8 hours  cefepime   IVPB 2000 milliGRAM(s) IV Intermittent every 8 hours  fluconAZOLE   Tablet 200 milliGRAM(s) Oral daily      HEME/ONC MEDICATIONS      STANDING MEDICATIONS  Biotene Dry Mouth Oral Rinse 15 milliLiter(s) Swish and Spit four times a day  famotidine    Tablet 20 milliGRAM(s) Oral two times a day  FIRST- Mouthwash  BLM 10 milliLiter(s) Swish and Spit four times a day  sodium chloride 0.65% Nasal 1 Spray(s) Both Nostrils four times a day      PRN MEDICATIONS  acetaminophen     Tablet .. 650 milliGRAM(s) Oral every 6 hours PRN        Vital Signs Last 24 Hrs  T(C): 37.6 (24 Dec 2021 05:41), Max: 39.2 (23 Dec 2021 17:17)  T(F): 99.7 (24 Dec 2021 05:41), Max: 102.5 (23 Dec 2021 17:17)  HR: 106 (24 Dec 2021 05:41) (104 - 112)  BP: 116/78 (24 Dec 2021 05:41) (116/78 - 137/70)  BP(mean): --  RR: 17 (24 Dec 2021 05:41) (16 - 18)  SpO2: 98% (24 Dec 2021 05:41) (97% - 100%)    PHYSICAL EXAM  General: NAD  HEENT: PERRLA, EOMOI, clear oropharynx, anicteric sclera, pink conjunctiva  Neck: supple  CV: (+) S1/S2 RRR  Lungs: clear to auscultation, no wheezes or rales  Abdomen: soft, non-tender, non-distended (+) BS  Ext: no clubbing, cyanosis or edema  Skin: no rashes and no petechiae  Neuro: alert and oriented X 3, no focal deficits  Central Line:     RECENT CULTURES:  12-22 @ 01:14  Clean Catch Clean Catch (Midstream)  --  --  --    No growth  --  12-21 @ 17:15  .Blood Blood  --  --  --    No growth to date.  --        LABS:                        8.0    1.62  )-----------( 37       ( 24 Dec 2021 06:51 )             22.6         Mean Cell Volume : 85.3 fl  Mean Cell Hemoglobin : 30.2 pg  Mean Cell Hemoglobin Concentration : 35.4 gm/dL  Auto Neutrophil # : x  Auto Lymphocyte # : x  Auto Monocyte # : x  Auto Eosinophil # : x  Auto Basophil # : x  Auto Neutrophil % : x  Auto Lymphocyte % : x  Auto Monocyte % : x  Auto Eosinophil % : x  Auto Basophil % : x      12-24    137  |  101  |  23  ----------------------------<  109<H>  4.3   |  22  |  1.18    Ca    9.4      24 Dec 2021 06:51  Phos  3.8     12-24  Mg     2.1     12-24    TPro  7.4  /  Alb  4.4  /  TBili  0.7  /  DBili  x   /  AST  9<L>  /  ALT  25  /  AlkPhos  75  12-24      Mg 2.1  Phos 3.8  Mg 1.9  Phos 3.3            Uric Acid 5.3        RADIOLOGY & ADDITIONAL STUDIES:         Diagnosis: Relapsed aplastic anemia    Protocol/Chemo Regimen: pending rabbit ATG    Day: TBA     Pt endorsed: right lower gum discomfort, mild increased today    Review of Systems: Patient denied nausea, vomiting, odynophagia, chest pain, cough, dyspnea, abdominal pain, constipation, diarrhea, rash, fatigue, headache    Pain scale:     3-4/10                                    Location: right lower gum     Diet: regular    Allergies: No Known Allergies    ANTIMICROBIALS  acyclovir   Oral Tab/Cap 400 milliGRAM(s) Oral every 8 hours  cefepime   IVPB 2000 milliGRAM(s) IV Intermittent every 8 hours  fluconAZOLE   Tablet 200 milliGRAM(s) Oral daily    STANDING MEDICATIONS  Biotene Dry Mouth Oral Rinse 15 milliLiter(s) Swish and Spit four times a day  famotidine    Tablet 20 milliGRAM(s) Oral two times a day  FIRST- Mouthwash  BLM 10 milliLiter(s) Swish and Spit four times a day  sodium chloride 0.65% Nasal 1 Spray(s) Both Nostrils four times a day    PRN MEDICATIONS  acetaminophen     Tablet .. 650 milliGRAM(s) Oral every 6 hours PRN    Vital Signs Last 24 Hrs  T(C): 37.6 (24 Dec 2021 05:41), Max: 39.2 (23 Dec 2021 17:17)  T(F): 99.7 (24 Dec 2021 05:41), Max: 102.5 (23 Dec 2021 17:17)  HR: 106 (24 Dec 2021 05:41) (104 - 112)  BP: 116/78 (24 Dec 2021 05:41) (116/78 - 137/70)  BP(mean): --  RR: 17 (24 Dec 2021 05:41) (16 - 18)  SpO2: 98% (24 Dec 2021 05:41) (97% - 100%)    PHYSICAL EXAM   General: adult in NAD  HEENT: clear oropharynx, anicteric sclera  CV: normal S1/S2 RRR  Lungs: positive air movement b/l ant lungs,clear to auscultation, no wheezes, no rales  Abdomen: soft, + BS,  non-tender non-distended  Ext: no edema  Skin: no rash  Neuro: alert and oriented X 3  Central Line: PIV CDI    RECENT CULTURES:  12-22 @ 01:14  Clean Catch Clean Catch (Midstream)  No growth    12-21 @ 17:15  .Blood Blood  No growth to date.    LABS:                        8.0    1.62  )-----------( 37       ( 24 Dec 2021 06:51 )             22.6     Mean Cell Volume : 85.3 fl  Mean Cell Hemoglobin : 30.2 pg  Mean Cell Hemoglobin Concentration : 35.4 gm/dL  Auto Neutrophil # : x  Auto Lymphocyte # : x  Auto Monocyte # : x  Auto Eosinophil # : x  Auto Basophil # : x  Auto Neutrophil % : x  Auto Lymphocyte % : x  Auto Monocyte % : x  Auto Eosinophil % : x  Auto Basophil % : x    12-24    137  |  101  |  23  ----------------------------<  109<H>  4.3   |  22  |  1.18    Ca    9.4      24 Dec 2021 06:51  Phos  3.8     12-24  Mg     2.1     12-24    TPro  7.4  /  Alb  4.4  /  TBili  0.7  /  DBili  x   /  AST  9<L>  /  ALT  25  /  AlkPhos  75  12-24    Mg 2.1  Phos 3.8  Mg 1.9  Phos 3.3      Uric Acid 5.3    RADIOLOGY & ADDITIONAL STUDIES:   CT Abdomen and Pelvis w/ IV Cont (12.23.21 @ 20:04)   No CT evidence for source of infection in the chest, abdomen or pelvis.

## 2021-12-25 LAB
ALBUMIN SERPL ELPH-MCNC: 4.3 G/DL — SIGNIFICANT CHANGE UP (ref 3.3–5)
ALP SERPL-CCNC: 73 U/L — SIGNIFICANT CHANGE UP (ref 40–120)
ALT FLD-CCNC: 26 U/L — SIGNIFICANT CHANGE UP (ref 10–45)
ANION GAP SERPL CALC-SCNC: 13 MMOL/L — SIGNIFICANT CHANGE UP (ref 5–17)
APTT BLD: 30.2 SEC — SIGNIFICANT CHANGE UP (ref 27.5–35.5)
AST SERPL-CCNC: 12 U/L — SIGNIFICANT CHANGE UP (ref 10–40)
BASOPHILS # BLD AUTO: 0 K/UL — SIGNIFICANT CHANGE UP (ref 0–0.2)
BASOPHILS NFR BLD AUTO: 0 % — SIGNIFICANT CHANGE UP (ref 0–2)
BILIRUB SERPL-MCNC: 0.5 MG/DL — SIGNIFICANT CHANGE UP (ref 0.2–1.2)
BUN SERPL-MCNC: 21 MG/DL — SIGNIFICANT CHANGE UP (ref 7–23)
CALCIUM SERPL-MCNC: 9.3 MG/DL — SIGNIFICANT CHANGE UP (ref 8.4–10.5)
CHLORIDE SERPL-SCNC: 103 MMOL/L — SIGNIFICANT CHANGE UP (ref 96–108)
CO2 SERPL-SCNC: 22 MMOL/L — SIGNIFICANT CHANGE UP (ref 22–31)
CREAT SERPL-MCNC: 0.98 MG/DL — SIGNIFICANT CHANGE UP (ref 0.5–1.3)
CULTURE RESULTS: SIGNIFICANT CHANGE UP
CULTURE RESULTS: SIGNIFICANT CHANGE UP
EOSINOPHIL # BLD AUTO: 0.02 K/UL — SIGNIFICANT CHANGE UP (ref 0–0.5)
EOSINOPHIL NFR BLD AUTO: 1.5 % — SIGNIFICANT CHANGE UP (ref 0–6)
FIBRINOGEN PPP-MCNC: 962 MG/DL — HIGH (ref 290–520)
GLUCOSE SERPL-MCNC: 108 MG/DL — HIGH (ref 70–99)
HCT VFR BLD CALC: 23.4 % — LOW (ref 39–50)
HGB BLD-MCNC: 8.4 G/DL — LOW (ref 13–17)
INR BLD: 1.05 RATIO — SIGNIFICANT CHANGE UP (ref 0.88–1.16)
LDH SERPL L TO P-CCNC: 162 U/L — SIGNIFICANT CHANGE UP (ref 50–242)
LYMPHOCYTES # BLD AUTO: 1.3 K/UL — SIGNIFICANT CHANGE UP (ref 1–3.3)
LYMPHOCYTES # BLD AUTO: 81.5 % — HIGH (ref 13–44)
MAGNESIUM SERPL-MCNC: 2.1 MG/DL — SIGNIFICANT CHANGE UP (ref 1.6–2.6)
MANUAL SMEAR VERIFICATION: SIGNIFICANT CHANGE UP
MCHC RBC-ENTMCNC: 30.1 PG — SIGNIFICANT CHANGE UP (ref 27–34)
MCHC RBC-ENTMCNC: 35.9 GM/DL — SIGNIFICANT CHANGE UP (ref 32–36)
MCV RBC AUTO: 83.9 FL — SIGNIFICANT CHANGE UP (ref 80–100)
MONOCYTES # BLD AUTO: 0.1 K/UL — SIGNIFICANT CHANGE UP (ref 0–0.9)
MONOCYTES NFR BLD AUTO: 6.2 % — SIGNIFICANT CHANGE UP (ref 2–14)
NEUTROPHILS # BLD AUTO: 0.1 K/UL — LOW (ref 1.8–7.4)
NEUTROPHILS NFR BLD AUTO: 6.2 % — LOW (ref 43–77)
NRBC # BLD: 2 /100 — HIGH (ref 0–0)
PHOSPHATE SERPL-MCNC: 3.9 MG/DL — SIGNIFICANT CHANGE UP (ref 2.5–4.5)
PLAT MORPH BLD: NORMAL — SIGNIFICANT CHANGE UP
PLATELET # BLD AUTO: 28 K/UL — LOW (ref 150–400)
POTASSIUM SERPL-MCNC: 4.4 MMOL/L — SIGNIFICANT CHANGE UP (ref 3.5–5.3)
POTASSIUM SERPL-SCNC: 4.4 MMOL/L — SIGNIFICANT CHANGE UP (ref 3.5–5.3)
PROT SERPL-MCNC: 7.4 G/DL — SIGNIFICANT CHANGE UP (ref 6–8.3)
PROTHROM AB SERPL-ACNC: 12.6 SEC — SIGNIFICANT CHANGE UP (ref 10.6–13.6)
RBC # BLD: 2.79 M/UL — LOW (ref 4.2–5.8)
RBC # FLD: 12.2 % — SIGNIFICANT CHANGE UP (ref 10.3–14.5)
RBC BLD AUTO: SIGNIFICANT CHANGE UP
SODIUM SERPL-SCNC: 138 MMOL/L — SIGNIFICANT CHANGE UP (ref 135–145)
SPECIMEN SOURCE: SIGNIFICANT CHANGE UP
SPECIMEN SOURCE: SIGNIFICANT CHANGE UP
URATE SERPL-MCNC: 5.2 MG/DL — SIGNIFICANT CHANGE UP (ref 3.4–8.8)
VANCOMYCIN TROUGH SERPL-MCNC: <4 UG/ML — LOW (ref 10–20)
VARIANT LYMPHS # BLD: 4.6 % — SIGNIFICANT CHANGE UP (ref 0–6)
WBC # BLD: 1.6 K/UL — LOW (ref 3.8–10.5)
WBC # FLD AUTO: 1.6 K/UL — LOW (ref 3.8–10.5)

## 2021-12-25 PROCEDURE — 99233 SBSQ HOSP IP/OBS HIGH 50: CPT | Mod: GC

## 2021-12-25 PROCEDURE — 70492 CT SFT TSUE NCK W/O & W/DYE: CPT | Mod: 26

## 2021-12-25 RX ADMIN — CEFEPIME 100 MILLIGRAM(S): 1 INJECTION, POWDER, FOR SOLUTION INTRAMUSCULAR; INTRAVENOUS at 05:40

## 2021-12-25 RX ADMIN — Medication 400 MILLIGRAM(S): at 13:21

## 2021-12-25 RX ADMIN — Medication 250 MILLIGRAM(S): at 05:44

## 2021-12-25 RX ADMIN — FLUCONAZOLE 200 MILLIGRAM(S): 150 TABLET ORAL at 11:42

## 2021-12-25 RX ADMIN — Medication 15 MILLILITER(S): at 11:43

## 2021-12-25 RX ADMIN — Medication 1 SPRAY(S): at 05:44

## 2021-12-25 RX ADMIN — Medication 1 SPRAY(S): at 11:42

## 2021-12-25 RX ADMIN — Medication 15 MILLILITER(S): at 05:44

## 2021-12-25 RX ADMIN — DIPHENHYDRAMINE HYDROCHLORIDE AND LIDOCAINE HYDROCHLORIDE AND ALUMINUM HYDROXIDE AND MAGNESIUM HYDRO 10 MILLILITER(S): KIT at 05:43

## 2021-12-25 RX ADMIN — FAMOTIDINE 20 MILLIGRAM(S): 10 INJECTION INTRAVENOUS at 17:56

## 2021-12-25 RX ADMIN — Medication 650 MILLIGRAM(S): at 17:31

## 2021-12-25 RX ADMIN — CEFEPIME 100 MILLIGRAM(S): 1 INJECTION, POWDER, FOR SOLUTION INTRAMUSCULAR; INTRAVENOUS at 13:21

## 2021-12-25 RX ADMIN — Medication 250 MILLIGRAM(S): at 17:54

## 2021-12-25 RX ADMIN — Medication 1 SPRAY(S): at 17:56

## 2021-12-25 RX ADMIN — FAMOTIDINE 20 MILLIGRAM(S): 10 INJECTION INTRAVENOUS at 05:42

## 2021-12-25 RX ADMIN — Medication 400 MILLIGRAM(S): at 05:43

## 2021-12-25 RX ADMIN — Medication 15 MILLILITER(S): at 18:00

## 2021-12-25 RX ADMIN — Medication 650 MILLIGRAM(S): at 18:01

## 2021-12-25 RX ADMIN — DIPHENHYDRAMINE HYDROCHLORIDE AND LIDOCAINE HYDROCHLORIDE AND ALUMINUM HYDROXIDE AND MAGNESIUM HYDRO 10 MILLILITER(S): KIT at 11:42

## 2021-12-25 RX ADMIN — CEFEPIME 100 MILLIGRAM(S): 1 INJECTION, POWDER, FOR SOLUTION INTRAMUSCULAR; INTRAVENOUS at 21:34

## 2021-12-25 RX ADMIN — Medication 400 MILLIGRAM(S): at 21:35

## 2021-12-25 NOTE — PROGRESS NOTE ADULT - PROBLEM SELECTOR PLAN 1
Relapsed Aplastic Anemia   -Dx 2020 via BMbx, s/p ATG, CSA, and promacta with complete remission  -BMbx on 11/9/20 revealed hypocellular marrow, overall cellularity improved (20-50%) showing marrow regeneration and BMBx on 5/18/2021 showing normal morphology with normal cellularity.  -BMbx 12/2021showing erythroid predominance, myeloid and erythroid maturation, and markedly decreased megakaryocytes   - Pt states he has not been able to receive Promacta outpatient x~10 days. Sent script to [facility] at [facility] on 12/22 12/23  transferred  to [facility] for ATG treatment, plan on 12/24.  Monitior CBC with diff/lytes, transfuse and or replete PRN  Monitor weight, I and O, mouth care  12/24- Plan for ATG test dose on 12/26. Relapsed Aplastic Anemia   -Dx 2020 via BM bx, s/p ATG, CSA, and promacta with complete remission  -BM bx on 11/9/20 revealed hypocellular marrow, overall cellularity improved (20-50%) showing marrow regeneration and BM Bx on 5/18/2021 showing normal morphology with normal cellularity.  -BMbx 12/2021showing erythroid predominance, myeloid and erythroid maturation, and markedly decreased megakaryocytes   - Pt states he has not been able to receive Promacta outpatient x~10 days. Sent script to [facility] at [facility] on 12/22 12/23  transferred  to [facility] for ATG treatment, plan on 12/24.  Monitor CBC with diff/lytes, transfuse and or replete PRN  Monitor weight, I and O, mouth care  12/24- Plan for ATG test dose on 12/26.

## 2021-12-25 NOTE — PROGRESS NOTE ADULT - ATTENDING COMMENTS
36M with Aplastic Anemia diagnosed April 2020.  Underwent treatment with equine ATG, Cyclosporine, and Promacta.  Went into remission (BM bx 5/2021 with normal morphology and cellularity).  Promacta stopped May 2021.  Cyclosporin stopped August.  By September, platelets started to drop.  Now with pancytopenia and neutropenic fever.  No obvious focus of infection, however, c/o sorethroat.  -Patient with ongoing fevers blood cultures negative - continue cefepime. Nasal MRSA +. Will start vancomycin.   -Dental consult with x-rays, no dental source of patient's R sided gum pain.   -Will check CT Neck soft tissue and maxillofacial with IV contrast to evaluate for abscess.   -Plan is for test dose ATG on Sunday. Treating Monday. 36M with Aplastic Anemia diagnosed April 2020.  Underwent treatment with equine ATG, Cyclosporine, and Promacta.  Went into remission (BM bx 5/2021 with normal morphology and cellularity).  Promacta stopped May 2021.  Cyclosporin stopped August.  By September, platelets started to drop.  Now with pancytopenia and neutropenic fever.  No obvious focus of infection, however, c/o sorethroat.  -Patient with ongoing fevers blood cultures negative - continue cefepime. Nasal MRSA + for staph aureus (not detected though for MRSA). Will start vancomycin.   -Dental consult with x-rays, no dental source of patient's R sided gum pain.   -Will check CT Neck soft tissue and maxillofacial with IV contrast to evaluate for abscess. Will get this scan today.   -Discussed with patient today about the possibility of going straight to allogeneic transplant, he has 30 year old sister. He will think about that tonight and will discuss further tomorrow. Have discussed transplant with patient extensively in the past.   -For now the tentative plan is for test dose ATG on Sunday.

## 2021-12-25 NOTE — PROGRESS NOTE ADULT - ASSESSMENT
36M with Aplastic Anemia diagnosed April 2020.  Underwent treatment with equine ATG, Cyclosporine, and Promacta.  Went into remission (BM bx 5/2021 with normal morphology and cellularity).  Promacta stopped May 2021.  Cyclosporin stopped August.  By September, platelets started to drop.  Now with pancytopenia and neutropenic fever.   36M with Aplastic Anemia diagnosed April 2020, s/p  equine ATG, Cyclosporine, and Promacta. Bone marrow biopsy was done on 5/2021 revealed normal morphology and cellularity, eventually Promacta was stopped and Cyclosporin in 5/2021, in 2/2021 noted his platelets dropping  now admitted with neutropenic fever found to have pancytopenia secondary to disease condition.  Bone marrow biopsy was repeated on 12/2021 showed decreased megakaryocytes with tentative plan to start ATG.

## 2021-12-25 NOTE — PROGRESS NOTE ADULT - SUBJECTIVE AND OBJECTIVE BOX
Diagnosis:    Protocol/Chemo Regimen:    Day:     Pt endorsed:    Review of Systems:     Pain scale:     Diet:     Allergies    No Known Allergies    Intolerances        ANTIMICROBIALS  acyclovir   Oral Tab/Cap 400 milliGRAM(s) Oral every 8 hours  cefepime   IVPB 2000 milliGRAM(s) IV Intermittent every 8 hours  fluconAZOLE   Tablet 200 milliGRAM(s) Oral daily  vancomycin  IVPB      vancomycin  IVPB 1000 milliGRAM(s) IV Intermittent every 12 hours      HEME/ONC MEDICATIONS      STANDING MEDICATIONS  Biotene Dry Mouth Oral Rinse 15 milliLiter(s) Swish and Spit four times a day  famotidine    Tablet 20 milliGRAM(s) Oral two times a day  FIRST- Mouthwash  BLM 10 milliLiter(s) Swish and Spit four times a day  sodium chloride 0.65% Nasal 1 Spray(s) Both Nostrils four times a day      PRN MEDICATIONS  acetaminophen     Tablet .. 650 milliGRAM(s) Oral every 6 hours PRN        Vital Signs Last 24 Hrs  T(C): 37.1 (25 Dec 2021 05:45), Max: 37.1 (24 Dec 2021 21:20)  T(F): 98.8 (25 Dec 2021 05:45), Max: 98.8 (24 Dec 2021 21:20)  HR: 79 (25 Dec 2021 05:45) (79 - 106)  BP: 127/77 (25 Dec 2021 05:45) (120/75 - 150/60)  BP(mean): --  RR: 18 (25 Dec 2021 05:45) (18 - 24)  SpO2: 97% (25 Dec 2021 05:45) (97% - 99%)    PHYSICAL EXAM  General: NAD  HEENT: PERRLA, EOMOI, clear oropharynx, anicteric sclera, pink conjunctiva  Neck: supple  CV: (+) S1/S2 RRR  Lungs: clear to auscultation, no wheezes or rales  Abdomen: soft, non-tender, non-distended (+) BS  Ext: no clubbing, cyanosis or edema  Skin: no rashes and no petechiae  Neuro: alert and oriented X 3, no focal deficits  Central Line:     RECENT CULTURES:  12-23 @ 17:32  .Throat Throat  --  --  --    No Streptococcus pyogenes (Group A) isolated  --  12-23 @ 14:58  .Throat Throat  --  --  --    No Streptococcus pyogenes (Group A) isolated  --  12-23 @ 09:16  .Blood Blood-Peripheral  --  --  --    No growth to date.  --  12-22 @ 01:14  Clean Catch Clean Catch (Midstream)  --  --  --    No growth  --  12-21 @ 17:15  .Blood Blood  --  --  --    No growth to date.  --        LABS:                        8.4    1.60  )-----------( 28       ( 25 Dec 2021 07:14 )             23.4         Mean Cell Volume : 83.9 fl  Mean Cell Hemoglobin : 30.1 pg  Mean Cell Hemoglobin Concentration : 35.9 gm/dL  Auto Neutrophil # : x  Auto Lymphocyte # : x  Auto Monocyte # : x  Auto Eosinophil # : x  Auto Basophil # : x  Auto Neutrophil % : x  Auto Lymphocyte % : x  Auto Monocyte % : x  Auto Eosinophil % : x  Auto Basophil % : x      12-25    138  |  103  |  21  ----------------------------<  108<H>  4.4   |  22  |  0.98    Ca    9.3      25 Dec 2021 07:14  Phos  3.9     12-25  Mg     2.1     12-25    TPro  7.4  /  Alb  4.3  /  TBili  0.5  /  DBili  x   /  AST  12  /  ALT  26  /  AlkPhos  73  12-25      Mg 2.1  Phos 3.9      PT/INR - ( 24 Dec 2021 06:51 )   PT: 12.0 sec;   INR: 1.00 ratio         PTT - ( 24 Dec 2021 06:51 )  PTT:32.2 sec      Uric Acid 5.2        RADIOLOGY & ADDITIONAL STUDIES:         Diagnosis: Relapsed aplastic anemia    Protocol/Chemo Regimen: pending rabbit ATG    Day: TBA     Pt endorsed: right lower gum discomfort    Review of Systems: Denies any chest pain ,palpitation, SOB, nausea, vomiting or diarrhea.    Pain scale:    4/10         Location: right lower gum     Diet: regular    Allergies: No Known Allergies    ANTIMICROBIALS  acyclovir   Oral Tab/Cap 400 milliGRAM(s) Oral every 8 hours  cefepime   IVPB 2000 milliGRAM(s) IV Intermittent every 8 hours  fluconAZOLE   Tablet 200 milliGRAM(s) Oral daily  vancomycin  IVPB      vancomycin  IVPB 1000 milliGRAM(s) IV Intermittent every 12 hours    STANDING MEDICATIONS  Biotene Dry Mouth Oral Rinse 15 milliLiter(s) Swish and Spit four times a day  famotidine    Tablet 20 milliGRAM(s) Oral two times a day  FIRST- Mouthwash  BLM 10 milliLiter(s) Swish and Spit four times a day  sodium chloride 0.65% Nasal 1 Spray(s) Both Nostrils four times a day    PRN MEDICATIONS  acetaminophen     Tablet .. 650 milliGRAM(s) Oral every 6 hours PRN    Vital Signs Last 24 Hrs  T(C): 37.1 (25 Dec 2021 05:45), Max: 37.1 (24 Dec 2021 21:20)  T(F): 98.8 (25 Dec 2021 05:45), Max: 98.8 (24 Dec 2021 21:20)  HR: 79 (25 Dec 2021 05:45) (79 - 106)  BP: 127/77 (25 Dec 2021 05:45) (120/75 - 150/60)  BP(mean): --  RR: 18 (25 Dec 2021 05:45) (18 - 24)  SpO2: 97% (25 Dec 2021 05:45) (97% - 99%)    PHYSICAL EXAM   General: adult in NAD  HEENT: clear oropharynx, anicteric sclera  CV: normal S1/S2 RRR  Lungs: positive air movement b/l ant lungs,clear to auscultation, no wheezes, no rales  Abdomen: soft, + BS,  non-tender non-distended  Ext: no edema  Skin: no rash  Neuro: alert and oriented X 3  Central Line: PIV CDI    RECENT CULTURES:  12-23 @ 17:32  .Throat Throat  No Streptococcus pyogenes (Group A) isolated    12-23 @ 14:58  .Throat Throat  No Streptococcus pyogenes (Group A) isolated    12-23 @ 09:16  .Blood Blood-Peripheral  No growth to date.    12-22 @ 01:14  Clean Catch Clean Catch (Midstream)  No growth    12-21 @ 17:15  .Blood Blood  No growth to date.    LABS:                        8.4    1.60  )-----------( 28       ( 25 Dec 2021 07:14 )             23.4   Mean Cell Volume : 83.9 fl  Mean Cell Hemoglobin : 30.1 pg  Mean Cell Hemoglobin Concentration : 35.9 gm/dL  Auto Neutrophil # : x  Auto Lymphocyte # : x  Auto Monocyte # : x  Auto Eosinophil # : x  Auto Basophil # : x  Auto Neutrophil % : x  Auto Lymphocyte % : x  Auto Monocyte % : x  Auto Eosinophil % : x  Auto Basophil % : x    12-25    138  |  103  |  21  ----------------------------<  108<H>  4.4   |  22  |  0.98    Ca    9.3      25 Dec 2021 07:14  Phos  3.9     12-25  Mg     2.1     12-25    TPro  7.4  /  Alb  4.3  /  TBili  0.5  /  DBili  x   /  AST  12  /  ALT  26  /  AlkPhos  73  12-25  Mg 2.1  Phos 3.9  PT/INR - ( 24 Dec 2021 06:51 )   PT: 12.0 sec;   INR: 1.00 ratio    PTT - ( 24 Dec 2021 06:51 )  PTT:32.2 sec    Uric Acid 5.2    RADIOLOGY & ADDITIONAL STUDIES:   CT Neck Soft Tissue w/wo IV Cont (12.25.21 @ 12:00) >   Moderate mucosal thickening within the LEFT maxillary,   BILATERAL ethmoid and frontal sinuses. Minimal increased density seen   within the BILATERAL frontal sinuses which may reflect inspissated   secretions or possibly fungal material, less likely hemorrhage.   Minimal   reactive lymph nodes noted..

## 2021-12-25 NOTE — PROGRESS NOTE ADULT - PROBLEM SELECTOR PLAN 2
Neutropenic, febrile   - continue cefepime for now  - to send  throat culture and MRSA/MSSA swab  - f/u all cultures, last on 12/21, NGTD  -CT CAP  to look for source of infection- No CT evidence for source of infection in the chest, abdomen or pelvis.  12/24- Staph aureus PCR detected, will start Vanco 1 gm Q 12 hrs.  12/24- F/u trough before the 4th dose. Neutropenic, febrile   continue cefepime for now  -  throat culture (-).  - MRSA (-), staph aureus PCR +.  - f/u all cultures, last on 12/21, NGTD  -CT CAP  to look for source of infection- No CT evidence for source of infection in the chest, abdomen or pelvis.  12/24- Staph aureus PCR detected, will start Vanco 1 gm Q 12 hrs.  12/24- F/u trough before the 4th dose, 12/26 before am dose.

## 2021-12-25 NOTE — PROGRESS NOTE ADULT - PROBLEM SELECTOR PLAN 4
12/24- Will check CT maxillofacial with contrast and CT neck with contrast to r/o abcess.  12/24- Will start Vanco empirically. 12/24- Will check CT maxillofacial with contrast and CT neck with contrast to r/o abscess.  12/24- Will start Vanco empirically, f/u before the 4th dose.

## 2021-12-26 ENCOUNTER — APPOINTMENT (OUTPATIENT)
Dept: INFUSION THERAPY | Facility: HOSPITAL | Age: 36
End: 2021-12-26

## 2021-12-26 LAB
ALBUMIN SERPL ELPH-MCNC: 4.1 G/DL — SIGNIFICANT CHANGE UP (ref 3.3–5)
ALP SERPL-CCNC: 71 U/L — SIGNIFICANT CHANGE UP (ref 40–120)
ALT FLD-CCNC: 23 U/L — SIGNIFICANT CHANGE UP (ref 10–45)
ANION GAP SERPL CALC-SCNC: 13 MMOL/L — SIGNIFICANT CHANGE UP (ref 5–17)
APTT BLD: 29.3 SEC — SIGNIFICANT CHANGE UP (ref 27.5–35.5)
AST SERPL-CCNC: 10 U/L — SIGNIFICANT CHANGE UP (ref 10–40)
BASOPHILS # BLD AUTO: 0 K/UL — SIGNIFICANT CHANGE UP (ref 0–0.2)
BASOPHILS NFR BLD AUTO: 0 % — SIGNIFICANT CHANGE UP (ref 0–2)
BILIRUB SERPL-MCNC: 0.5 MG/DL — SIGNIFICANT CHANGE UP (ref 0.2–1.2)
BUN SERPL-MCNC: 20 MG/DL — SIGNIFICANT CHANGE UP (ref 7–23)
CALCIUM SERPL-MCNC: 9.1 MG/DL — SIGNIFICANT CHANGE UP (ref 8.4–10.5)
CHLORIDE SERPL-SCNC: 102 MMOL/L — SIGNIFICANT CHANGE UP (ref 96–108)
CO2 SERPL-SCNC: 22 MMOL/L — SIGNIFICANT CHANGE UP (ref 22–31)
CREAT SERPL-MCNC: 0.96 MG/DL — SIGNIFICANT CHANGE UP (ref 0.5–1.3)
CULTURE RESULTS: SIGNIFICANT CHANGE UP
CULTURE RESULTS: SIGNIFICANT CHANGE UP
EOSINOPHIL # BLD AUTO: 0 K/UL — SIGNIFICANT CHANGE UP (ref 0–0.5)
EOSINOPHIL NFR BLD AUTO: 0 % — SIGNIFICANT CHANGE UP (ref 0–6)
FIBRINOGEN PPP-MCNC: 914 MG/DL — HIGH (ref 290–520)
GLUCOSE SERPL-MCNC: 101 MG/DL — HIGH (ref 70–99)
HCT VFR BLD CALC: 19.4 % — CRITICAL LOW (ref 39–50)
HGB BLD-MCNC: 7.1 G/DL — LOW (ref 13–17)
INR BLD: 1.11 RATIO — SIGNIFICANT CHANGE UP (ref 0.88–1.16)
LDH SERPL L TO P-CCNC: 140 U/L — SIGNIFICANT CHANGE UP (ref 50–242)
LYMPHOCYTES # BLD AUTO: 1.44 K/UL — SIGNIFICANT CHANGE UP (ref 1–3.3)
LYMPHOCYTES # BLD AUTO: 90.3 % — HIGH (ref 13–44)
MAGNESIUM SERPL-MCNC: 2.1 MG/DL — SIGNIFICANT CHANGE UP (ref 1.6–2.6)
MANUAL SMEAR VERIFICATION: SIGNIFICANT CHANGE UP
MCHC RBC-ENTMCNC: 30.5 PG — SIGNIFICANT CHANGE UP (ref 27–34)
MCHC RBC-ENTMCNC: 36.6 GM/DL — HIGH (ref 32–36)
MCV RBC AUTO: 83.3 FL — SIGNIFICANT CHANGE UP (ref 80–100)
MONOCYTES # BLD AUTO: 0.03 K/UL — SIGNIFICANT CHANGE UP (ref 0–0.9)
MONOCYTES NFR BLD AUTO: 1.8 % — LOW (ref 2–14)
NEUTROPHILS # BLD AUTO: 0.13 K/UL — LOW (ref 1.8–7.4)
NEUTROPHILS NFR BLD AUTO: 7.9 % — LOW (ref 43–77)
PHOSPHATE SERPL-MCNC: 3.3 MG/DL — SIGNIFICANT CHANGE UP (ref 2.5–4.5)
PLAT MORPH BLD: NORMAL — SIGNIFICANT CHANGE UP
PLATELET # BLD AUTO: 21 K/UL — LOW (ref 150–400)
POTASSIUM SERPL-MCNC: 4.2 MMOL/L — SIGNIFICANT CHANGE UP (ref 3.5–5.3)
POTASSIUM SERPL-SCNC: 4.2 MMOL/L — SIGNIFICANT CHANGE UP (ref 3.5–5.3)
PROT SERPL-MCNC: 6.9 G/DL — SIGNIFICANT CHANGE UP (ref 6–8.3)
PROTHROM AB SERPL-ACNC: 13.2 SEC — SIGNIFICANT CHANGE UP (ref 10.6–13.6)
RBC # BLD: 2.33 M/UL — LOW (ref 4.2–5.8)
RBC # FLD: 12.2 % — SIGNIFICANT CHANGE UP (ref 10.3–14.5)
RBC BLD AUTO: SIGNIFICANT CHANGE UP
SODIUM SERPL-SCNC: 137 MMOL/L — SIGNIFICANT CHANGE UP (ref 135–145)
SPECIMEN SOURCE: SIGNIFICANT CHANGE UP
SPECIMEN SOURCE: SIGNIFICANT CHANGE UP
URATE SERPL-MCNC: 4.8 MG/DL — SIGNIFICANT CHANGE UP (ref 3.4–8.8)
VANCOMYCIN TROUGH SERPL-MCNC: 5.3 UG/ML — LOW (ref 10–20)
WBC # BLD: 1.6 K/UL — LOW (ref 3.8–10.5)
WBC # FLD AUTO: 1.6 K/UL — LOW (ref 3.8–10.5)

## 2021-12-26 PROCEDURE — 99233 SBSQ HOSP IP/OBS HIGH 50: CPT | Mod: GC

## 2021-12-26 PROCEDURE — 99232 SBSQ HOSP IP/OBS MODERATE 35: CPT

## 2021-12-26 RX ORDER — CYCLOSPORINE 100 MG/1
300 CAPSULE ORAL EVERY 12 HOURS
Refills: 0 | Status: DISCONTINUED | OUTPATIENT
Start: 2021-12-26 | End: 2021-12-29

## 2021-12-26 RX ORDER — LIDOCAINE 4 G/100G
2 CREAM TOPICAL
Refills: 0 | Status: DISCONTINUED | OUTPATIENT
Start: 2021-12-26 | End: 2022-01-11

## 2021-12-26 RX ORDER — CHLORHEXIDINE GLUCONATE 213 G/1000ML
15 SOLUTION TOPICAL
Refills: 0 | Status: COMPLETED | OUTPATIENT
Start: 2021-12-26 | End: 2021-12-29

## 2021-12-26 RX ORDER — ACETAMINOPHEN 500 MG
1000 TABLET ORAL ONCE
Refills: 0 | Status: COMPLETED | OUTPATIENT
Start: 2021-12-26 | End: 2021-12-26

## 2021-12-26 RX ADMIN — Medication 400 MILLIGRAM(S): at 14:43

## 2021-12-26 RX ADMIN — Medication 1 SPRAY(S): at 17:15

## 2021-12-26 RX ADMIN — LIDOCAINE 2 MILLILITER(S): 4 CREAM TOPICAL at 17:15

## 2021-12-26 RX ADMIN — Medication 400 MILLIGRAM(S): at 16:29

## 2021-12-26 RX ADMIN — DIPHENHYDRAMINE HYDROCHLORIDE AND LIDOCAINE HYDROCHLORIDE AND ALUMINUM HYDROXIDE AND MAGNESIUM HYDRO 10 MILLILITER(S): KIT at 11:50

## 2021-12-26 RX ADMIN — DIPHENHYDRAMINE HYDROCHLORIDE AND LIDOCAINE HYDROCHLORIDE AND ALUMINUM HYDROXIDE AND MAGNESIUM HYDRO 10 MILLILITER(S): KIT at 23:20

## 2021-12-26 RX ADMIN — DIPHENHYDRAMINE HYDROCHLORIDE AND LIDOCAINE HYDROCHLORIDE AND ALUMINUM HYDROXIDE AND MAGNESIUM HYDRO 10 MILLILITER(S): KIT at 06:51

## 2021-12-26 RX ADMIN — Medication 650 MILLIGRAM(S): at 22:35

## 2021-12-26 RX ADMIN — FLUCONAZOLE 200 MILLIGRAM(S): 150 TABLET ORAL at 11:50

## 2021-12-26 RX ADMIN — FAMOTIDINE 20 MILLIGRAM(S): 10 INJECTION INTRAVENOUS at 17:15

## 2021-12-26 RX ADMIN — CYCLOSPORINE 300 MILLIGRAM(S): 100 CAPSULE ORAL at 17:14

## 2021-12-26 RX ADMIN — FAMOTIDINE 20 MILLIGRAM(S): 10 INJECTION INTRAVENOUS at 06:48

## 2021-12-26 RX ADMIN — CHLORHEXIDINE GLUCONATE 15 MILLILITER(S): 213 SOLUTION TOPICAL at 22:19

## 2021-12-26 RX ADMIN — Medication 15 MILLILITER(S): at 11:49

## 2021-12-26 RX ADMIN — Medication 650 MILLIGRAM(S): at 07:25

## 2021-12-26 RX ADMIN — CEFEPIME 100 MILLIGRAM(S): 1 INJECTION, POWDER, FOR SOLUTION INTRAMUSCULAR; INTRAVENOUS at 06:49

## 2021-12-26 RX ADMIN — LIDOCAINE 2 MILLILITER(S): 4 CREAM TOPICAL at 23:24

## 2021-12-26 RX ADMIN — Medication 650 MILLIGRAM(S): at 22:05

## 2021-12-26 RX ADMIN — Medication 400 MILLIGRAM(S): at 06:48

## 2021-12-26 RX ADMIN — Medication 15 MILLILITER(S): at 17:14

## 2021-12-26 RX ADMIN — Medication 1 SPRAY(S): at 11:49

## 2021-12-26 RX ADMIN — Medication 15 MILLILITER(S): at 23:19

## 2021-12-26 RX ADMIN — Medication 1 SPRAY(S): at 23:20

## 2021-12-26 RX ADMIN — CEFEPIME 100 MILLIGRAM(S): 1 INJECTION, POWDER, FOR SOLUTION INTRAMUSCULAR; INTRAVENOUS at 14:42

## 2021-12-26 RX ADMIN — Medication 250 MILLIGRAM(S): at 06:59

## 2021-12-26 RX ADMIN — Medication 650 MILLIGRAM(S): at 08:25

## 2021-12-26 RX ADMIN — Medication 400 MILLIGRAM(S): at 21:57

## 2021-12-26 RX ADMIN — Medication 15 MILLILITER(S): at 06:51

## 2021-12-26 RX ADMIN — Medication 1000 MILLIGRAM(S): at 16:44

## 2021-12-26 RX ADMIN — Medication 1 SPRAY(S): at 06:51

## 2021-12-26 RX ADMIN — CEFEPIME 100 MILLIGRAM(S): 1 INJECTION, POWDER, FOR SOLUTION INTRAMUSCULAR; INTRAVENOUS at 21:57

## 2021-12-26 NOTE — PROGRESS NOTE ADULT - SUBJECTIVE AND OBJECTIVE BOX
Diagnosis:    Protocol/Chemo Regimen:    Day:     Pt endorsed:    Review of Systems:     Pain scale:     Diet:     Allergies    No Known Allergies    Intolerances        ANTIMICROBIALS  acyclovir   Oral Tab/Cap 400 milliGRAM(s) Oral every 8 hours  cefepime   IVPB 2000 milliGRAM(s) IV Intermittent every 8 hours  fluconAZOLE   Tablet 200 milliGRAM(s) Oral daily  vancomycin  IVPB      vancomycin  IVPB 1000 milliGRAM(s) IV Intermittent every 12 hours      HEME/ONC MEDICATIONS      STANDING MEDICATIONS  Biotene Dry Mouth Oral Rinse 15 milliLiter(s) Swish and Spit four times a day  famotidine    Tablet 20 milliGRAM(s) Oral two times a day  FIRST- Mouthwash  BLM 10 milliLiter(s) Swish and Spit four times a day  sodium chloride 0.65% Nasal 1 Spray(s) Both Nostrils four times a day      PRN MEDICATIONS  acetaminophen     Tablet .. 650 milliGRAM(s) Oral every 6 hours PRN        Vital Signs Last 24 Hrs  T(C): 36.7 (26 Dec 2021 07:06), Max: 37 (25 Dec 2021 17:20)  T(F): 98.1 (26 Dec 2021 07:06), Max: 98.6 (25 Dec 2021 17:20)  HR: 84 (26 Dec 2021 07:06) (84 - 101)  BP: 116/77 (26 Dec 2021 07:06) (116/72 - 142/84)  BP(mean): --  RR: 18 (26 Dec 2021 07:06) (16 - 18)  SpO2: 98% (26 Dec 2021 07:06) (95% - 98%)    PHYSICAL EXAM  General: NAD  HEENT: PERRLA, EOMOI, clear oropharynx, anicteric sclera, pink conjunctiva  Neck: supple  CV: (+) S1/S2 RRR  Lungs: clear to auscultation, no wheezes or rales  Abdomen: soft, non-tender, non-distended (+) BS  Ext: no clubbing, cyanosis or edema  Skin: no rashes and no petechiae  Neuro: alert and oriented X 3, no focal deficits  Central Line:     RECENT CULTURES:  12-23 @ 17:32  .Throat Throat  --  --  --    No Streptococcus pyogenes (Group A) isolated  --  12-23 @ 14:58  .Throat Throat  --  --  --    No Streptococcus pyogenes (Group A) isolated  --  12-23 @ 09:16  .Blood Blood-Peripheral  --  --  --    No growth to date.  --  12-22 @ 01:14  Clean Catch Clean Catch (Midstream)  --  --  --    No growth  --  12-21 @ 17:15  .Blood Blood  --  --  --    No growth to date.  --        LABS:                        8.4    1.60  )-----------( 28       ( 25 Dec 2021 07:14 )             23.4         Mean Cell Volume : 83.9 fl  Mean Cell Hemoglobin : 30.1 pg  Mean Cell Hemoglobin Concentration : 35.9 gm/dL  Auto Neutrophil # : 0.10 K/uL  Auto Lymphocyte # : 1.30 K/uL  Auto Monocyte # : 0.10 K/uL  Auto Eosinophil # : 0.02 K/uL  Auto Basophil # : 0.00 K/uL  Auto Neutrophil % : 6.2 %  Auto Lymphocyte % : 81.5 %  Auto Monocyte % : 6.2 %  Auto Eosinophil % : 1.5 %  Auto Basophil % : 0.0 %      12-25    138  |  103  |  21  ----------------------------<  108<H>  4.4   |  22  |  0.98    Ca    9.3      25 Dec 2021 07:14  Phos  3.9     12-25  Mg     2.1     12-25    TPro  7.4  /  Alb  4.3  /  TBili  0.5  /  DBili  x   /  AST  12  /  ALT  26  /  AlkPhos  73  12-25          PT/INR - ( 25 Dec 2021 07:14 )   PT: 12.6 sec;   INR: 1.05 ratio         PTT - ( 25 Dec 2021 07:14 )  PTT:30.2 sec        RADIOLOGY & ADDITIONAL STUDIES:         Diagnosis: Relapsed aplastic anemia    Protocol/Chemo Regimen: pending rabbit ATG    Day: TBA     Pt endorsed: right lower gum discomfort/pain    Review of Systems: Denies any chest pain ,palpitation, SOB, nausea, vomiting or diarrhea.    Pain scale:    2-3/10         Location: right lower gum     Diet: regular    Allergies: No Known Allergies    ANTIMICROBIALS  acyclovir   Oral Tab/Cap 400 milliGRAM(s) Oral every 8 hours  cefepime   IVPB 2000 milliGRAM(s) IV Intermittent every 8 hours  fluconAZOLE   Tablet 200 milliGRAM(s) Oral daily  vancomycin  IVPB      vancomycin  IVPB 1000 milliGRAM(s) IV Intermittent every 12 hours    STANDING MEDICATIONS  Biotene Dry Mouth Oral Rinse 15 milliLiter(s) Swish and Spit four times a day  famotidine    Tablet 20 milliGRAM(s) Oral two times a day  FIRST- Mouthwash  BLM 10 milliLiter(s) Swish and Spit four times a day  sodium chloride 0.65% Nasal 1 Spray(s) Both Nostrils four times a day    PRN MEDICATIONS  acetaminophen     Tablet .. 650 milliGRAM(s) Oral every 6 hours PRN    Vital Signs Last 24 Hrs  T(C): 36.7 (26 Dec 2021 07:06), Max: 37 (25 Dec 2021 17:20)  T(F): 98.1 (26 Dec 2021 07:06), Max: 98.6 (25 Dec 2021 17:20)  HR: 84 (26 Dec 2021 07:06) (84 - 101)  BP: 116/77 (26 Dec 2021 07:06) (116/72 - 142/84)  BP(mean): --  RR: 18 (26 Dec 2021 07:06) (16 - 18)  SpO2: 98% (26 Dec 2021 07:06) (95% - 98%)    PHYSICAL EXAM  General: adult in NAD  HEENT: clear oropharynx, anicteric sclera, tenderness + in right submandibular area.  CV: normal S1/S2 RRR  Lungs: positive air movement b/l ant lungs,clear to auscultation, no wheezes, no rales  Abdomen: soft, + BS,  non-tender non-distended  Ext: no edema in BLE   Skin: no rash  Neuro: alert and oriented X 3  Central Line: PIV CDI    RECENT CULTURES:  12-23 @ 17:32  Throat Throat  No Streptococcus pyogenes (Group A) isolated    12-23 @ 14:58  .Throat Throat  No Streptococcus pyogenes (Group A) isolated    12-23 @ 09:16  .Blood Blood-Peripheral  No growth to date.    12-22 @ 01:14  Clean Catch Clean Catch (Midstream)  No growth    12-21 @ 17:15  .Blood Blood  No growth to date.    LABS:                          7.1    1.60  )-----------( 21       ( 26 Dec 2021 07:42 )             19.4     Mean Cell Volume : 83.3 fl  Mean Cell Hemoglobin : 30.5 pg  Mean Cell Hemoglobin Concentration : 36.6 gm/dL  Auto Neutrophil # : 0.13 K/uL  Auto Lymphocyte # : 1.44 K/uL  Auto Monocyte # : 0.03 K/uL  Auto Eosinophil # : 0.00 K/uL  Auto Basophil # : 0.00 K/uL  Auto Neutrophil % : 7.9 %  Auto Lymphocyte % : 90.3 %  Auto Monocyte % : 1.8 %  Auto Eosinophil % : 0.0 %  Auto Basophil % : 0.0 %      12-26    137  |  102  |  20  ----------------------------<  101<H>  4.2   |  22  |  0.96    Ca    9.1      26 Dec 2021 07:42  Phos  3.3     12-26  Mg     2.1     12-26    TPro  6.9  /  Alb  4.1  /  TBili  0.5  /  DBili  x   /  AST  10  /  ALT  23  /  AlkPhos  71  12-26  Mg 2.1  Phos 3.3  PT/INR - ( 26 Dec 2021 07:42 )   PT: 13.2 sec;   INR: 1.11 ratio    PTT - ( 26 Dec 2021 07:42 )  PTT:29.3 sec    Uric Acid 4.8    RADIOLOGY & ADDITIONAL STUDIES:  CT Neck Soft Tissue w/wo IV Cont (12.25.21 @ 12:00) >  Moderate mucosal thickening within the LEFT maxillary,   BILATERAL ethmoid and frontal sinuses. Minimal increased density seen   within the BILATERAL frontal sinuses which may reflect inspissated   secretions or possibly fungal material, less likely hemorrhage.   Minimal   reactive lymph nodes noted..

## 2021-12-26 NOTE — PROGRESS NOTE ADULT - ASSESSMENT
36M with Aplastic Anemia diagnosed April 2020.  Underwent treatment with equine ATG, Cyclosporine, and Promacta.  Went into remission (BM bx 5/2021 with normal morphology and cellularity).  Promacta stopped May 2021.  Cyclosporin stopped August.  By September, platelets started to drop.  Now with pancytopenia and neutropenic fever.  No obvious focus of infection, however, c/o sore throat.  RVP, blood cx, urine cx and throat cx negative  abd/plevis CT negative  neck soft tissue CT: some sinus changes  which could be secretions or fungal material but pt asymptomatic anyway    Neutropenic fever, negative infectious w/u  * c/w cefepime for now, started 12/21, now day 6  * discontinue vanco as all cultures negative, the sore throat has resolved as well  * c/w acylovir and fluconazole ppx as per the protocol    The above assessment and plan was discussed with the primary team    Ritika Valiente MD  Pager [phone]  After 5pm and on weekends call [phone]

## 2021-12-26 NOTE — PROGRESS NOTE ADULT - ATTENDING COMMENTS
36M with Aplastic Anemia diagnosed April 2020.  Underwent treatment with equine ATG, Cyclosporine, and Promacta.  Went into remission (BM bx 5/2021 with normal morphology and cellularity).  Promacta stopped May 2021.  Cyclosporin stopped August.  By September, platelets started to drop.  Now with pancytopenia and neutropenic fever.  No obvious focus of infection, however, c/o sorethroat.  -Patient with ongoing fevers blood cultures negative - continue cefepime. Nasal MRSA + for staph aureus (not detected though for MRSA). Will start vancomycin.   -Dental consult with x-rays, no dental source of patient's R sided gum pain.   -Will check CT Neck soft tissue and maxillofacial with IV contrast to evaluate for abscess. Will get this scan today.   -Discussed with patient today about the possibility of going straight to allogeneic transplant, he has 30 year old sister. He will think about that tonight and will discuss further tomorrow. Have discussed transplant with patient extensively in the past.   -For now the tentative plan is for test dose ATG on Sunday. 36M with Aplastic Anemia diagnosed April 2020.  Underwent treatment with equine ATG, Cyclosporine, and Promacta.  Went into remission (BM bx 5/2021 with normal morphology and cellularity).  Promacta stopped May 2021.  Cyclosporin stopped August.  By September, platelets started to drop.  Now with pancytopenia and neutropenic fever.  No obvious focus of infection, however, c/o sorethroat.  -Patient with ongoing fevers blood cultures negative - continue cefepime. Nasal MRSA + for staph aureus (not detected though for MRSA). Will start vancomycin.   -Dental consult with x-rays, no dental source of patient's R sided gum pain. CT scan showed some sinus opacification but asymptomatic from that standpoint.  -Discussed with patient today about the possibility of going straight to allogeneic transplant, he has 30 year old sister. Have discussed transplant with patient extensively in the past.   -Afebrile now for over 24 hours. Discontinued vancomycin.   -Will restart cyclosporine as 300 mg BID and check level Wednesday morning.   -Possible rabbit ATG, will revisit daily but prefer transplant. Patient reported he plans to speak to his sister today. Will continue to follow up daily about this.

## 2021-12-26 NOTE — PROGRESS NOTE ADULT - SUBJECTIVE AND OBJECTIVE BOX
Follow Up:  neutropenic fever    Interval History: pt afebrile and asymptomatic, doing well, neck soft tissue CT negative, ?sinus changes but no symptoms anyway    ROS:      All other systems negative    Constitutional: no fever, no chills  ENT:  no sore throat, slight rhinorrhea since he has been here  Cardiovascular:  no chest pain, no palpitation  Respiratory:  no SOB, no cough  GI:  no abd pain, no vomiting, no diarrhea  urinary: no dysuria, no hematuria, no flank pain  musculoskeletal:  no joint pain, no joint swelling  skin:  no rash  neurology:  no headache, no seizure      Allergies  No Known Allergies        ANTIMICROBIALS:  acyclovir   Oral Tab/Cap 400 every 8 hours  cefepime   IVPB 2000 every 8 hours  fluconAZOLE   Tablet 200 daily      OTHER MEDS:  acetaminophen     Tablet .. 650 milliGRAM(s) Oral every 6 hours PRN  Biotene Dry Mouth Oral Rinse 15 milliLiter(s) Swish and Spit four times a day  famotidine    Tablet 20 milliGRAM(s) Oral two times a day  FIRST- Mouthwash  BLM 10 milliLiter(s) Swish and Spit four times a day  sodium chloride 0.65% Nasal 1 Spray(s) Both Nostrils four times a day      Vital Signs Last 24 Hrs  T(C): 36.8 (26 Dec 2021 07:45), Max: 37 (25 Dec 2021 17:20)  T(F): 98.2 (26 Dec 2021 07:45), Max: 98.6 (25 Dec 2021 17:20)  HR: 99 (26 Dec 2021 07:45) (84 - 101)  BP: 124/76 (26 Dec 2021 07:45) (116/72 - 142/84)  BP(mean): 92 (26 Dec 2021 07:45) (92 - 92)  RR: 17 (26 Dec 2021 07:45) (16 - 18)  SpO2: 98% (26 Dec 2021 07:45) (95% - 98%)    Physical Exam:  General:    NAD,  non toxic  ENT:    petechiae on pharynx, no exudate   Cardio:     regular S1, S2,  no murmur  Respiratory:    clear b/l,    no wheezing  abd:     soft,   BS +,   no tenderness  :   no CVAT,  no suprapubic tenderness,   no  richter  Musculoskeletal:   no joint swelling  vascular: no phlebitis  Skin:    no rash                          7.1    1.60  )-----------( 21       ( 26 Dec 2021 07:42 )             19.4       12-26    137  |  102  |  20  ----------------------------<  101<H>  4.2   |  22  |  0.96    Ca    9.1      26 Dec 2021 07:42  Phos  3.3     12-26  Mg     2.1     12-26    TPro  6.9  /  Alb  4.1  /  TBili  0.5  /  DBili  x   /  AST  10  /  ALT  23  /  AlkPhos  71  12-26          MICROBIOLOGY:  Vancomycin Level, Trough: 5.3 ug/mL (12-26-21 @ 07:42)  v  .Throat Throat  12-23-21   No Streptococcus pyogenes (Group A) isolated  --  --      .Throat Throat  12-23-21   No Streptococcus pyogenes (Group A) isolated  --  --      .Blood Blood-Peripheral  12-23-21   No growth to date.  --  --      Clean Catch Clean Catch (Midstream)  12-22-21   No growth  --  --      .Blood Blood  12-21-21   No growth to date.  --  --          Rapid RVP Result: NotDetec (12-21 @ 13:37)        RADIOLOGY:  Images independently visualized and reviewed personally, findings as below  < from: CT Neck Soft Tissue w/wo IV Cont (12.25.21 @ 12:00) >    IMPRESSION: Moderate mucosal thickening within the LEFT maxillary,   BILATERAL ethmoid and frontal sinuses. Minimal increased density seen   within the BILATERAL frontal sinuses which may reflect inspissated   secretions or possibly fungal material, less likely hemorrhage.   Minimal   reactive lymph nodes noted..    < end of copied text >  < from: CT Abdomen and Pelvis w/ IV Cont (12.23.21 @ 20:04) >  IMPRESSION:  No CT evidence for source of infection in the chest, abdomen or pelvis.      < end of copied text >

## 2021-12-26 NOTE — PROGRESS NOTE ADULT - PROBLEM SELECTOR PLAN 2
Neutropenic, febrile   continue cefepime for now  -  throat culture (-).  - MRSA (-), staph aureus PCR +.  - f/u all cultures, last on 12/21, NGTD  -CT CAP  to look for source of infection- No CT evidence for source of infection in the chest, abdomen or pelvis.  12/24- Staph aureus PCR detected, will start Vanco 1 gm Q 12 hrs.  12/24- F/u trough before the 4th dose, 12/26 before am dose. Neutropenic, afebrile   continue cefepime for now  throat culture (-).  MRSA (-), staph aureus PCR +.  f/u all cultures, last on 12/21, NGTD  CT CAP  to look for source of infection- No CT evidence for source of infection in the chest, abdomen or pelvis.  12/24- Staph aureus PCR detected, will start Vanco 1 gm Q 12 hrs.  12/26- Vanco discontinued on 12/26 as patient continue to be afebrile and CT maxillo facial did not reveal any abscess.

## 2021-12-26 NOTE — PROGRESS NOTE ADULT - PROBLEM SELECTOR PLAN 4
12/24- Will check CT maxillofacial with contrast and CT neck with contrast to r/o abscess.  12/24- Will start Vanco empirically, f/u before the 4th dose. 12/24- Will check CT maxillofacial with contrast and CT neck with contrast to r/o abscess. (-)  12/26- pain control., First mouth wash, local Lidocaine adm with Q tips.

## 2021-12-26 NOTE — PROGRESS NOTE ADULT - PROBLEM SELECTOR PLAN 1
Relapsed Aplastic Anemia   -Dx 2020 via BM bx, s/p ATG, CSA, and promacta with complete remission  -BM bx on 11/9/20 revealed hypocellular marrow, overall cellularity improved (20-50%) showing marrow regeneration and BM Bx on 5/18/2021 showing normal morphology with normal cellularity.  -BMbx 12/2021showing erythroid predominance, myeloid and erythroid maturation, and markedly decreased megakaryocytes   - Pt states he has not been able to receive Promacta outpatient x~10 days. Sent script to [facility] at [facility] on 12/22 12/23  transferred  to [facility] for ATG treatment, plan on 12/24.  Monitor CBC with diff/lytes, transfuse and or replete PRN  Monitor weight, I and O, mouth care  12/24- Plan for ATG test dose on 12/26. Relapsed Aplastic Anemia   S/p ATG, CSA, and promacta with complete remission  BM bx on 11/9/20 revealed hypocellular marrow.  BMbx 12/2021showing erythroid predominance, myeloid and erythroid maturation, and markedly decreased megakaryocytes   Pt states he has not been able to receive Promacta outpatient x~10 days. Sent script to [facility] at [facility] on 12/22 12/23  transferred  to [facility] for ATG treatment, plan on 12/24.  Monitor CBC with diff/lytes, transfuse and or replete PRN  Monitor weight, I and O, mouth care  12/24- Plan for ATG test dose this week.  12/26- Start Cyclosporine 300 mg PO Q 12 hrs with f/u level on 12/29.

## 2021-12-26 NOTE — PROGRESS NOTE ADULT - ASSESSMENT
36M with Aplastic Anemia diagnosed April 2020, s/p  equine ATG, Cyclosporine, and Promacta. Bone marrow biopsy was done on 5/2021 revealed normal morphology and cellularity, eventually Promacta was stopped and Cyclosporin in 5/2021, in 2/2021 noted his platelets dropping  now admitted with neutropenic fever found to have pancytopenia secondary to disease condition.  Bone marrow biopsy was repeated on 12/2021 showed decreased megakaryocytes with tentative plan to start ATG.   36M with Aplastic Anemia diagnosed April 2020, s/p equine ATG, Cyclosporine, and Promacta. Bone marrow biopsy was done on 5/2021 revealed normal morphology and cellularity, eventually Promacta was stopped and Cyclosporin in 5/2021, in September 2021 noted his platelets dropping  now admitted with neutropenic fever found to have pancytopenia secondary to disease condition.  Bone marrow biopsy was repeated on 12/2021 showed decreased megakaryocytes with tentative plan to start ATG this week.

## 2021-12-27 LAB
ALBUMIN SERPL ELPH-MCNC: 4.4 G/DL — SIGNIFICANT CHANGE UP (ref 3.3–5)
ALP SERPL-CCNC: 79 U/L — SIGNIFICANT CHANGE UP (ref 40–120)
ALT FLD-CCNC: 41 U/L — SIGNIFICANT CHANGE UP (ref 10–45)
ANION GAP SERPL CALC-SCNC: 10 MMOL/L — SIGNIFICANT CHANGE UP (ref 5–17)
APTT BLD: 31 SEC — SIGNIFICANT CHANGE UP (ref 27.5–35.5)
AST SERPL-CCNC: 21 U/L — SIGNIFICANT CHANGE UP (ref 10–40)
BASOPHILS # BLD AUTO: 0 K/UL — SIGNIFICANT CHANGE UP (ref 0–0.2)
BASOPHILS NFR BLD AUTO: 0 % — SIGNIFICANT CHANGE UP (ref 0–2)
BILIRUB SERPL-MCNC: 0.4 MG/DL — SIGNIFICANT CHANGE UP (ref 0.2–1.2)
BUN SERPL-MCNC: 21 MG/DL — SIGNIFICANT CHANGE UP (ref 7–23)
CALCIUM SERPL-MCNC: 9.3 MG/DL — SIGNIFICANT CHANGE UP (ref 8.4–10.5)
CHLORIDE SERPL-SCNC: 104 MMOL/L — SIGNIFICANT CHANGE UP (ref 96–108)
CO2 SERPL-SCNC: 23 MMOL/L — SIGNIFICANT CHANGE UP (ref 22–31)
CREAT SERPL-MCNC: 1.02 MG/DL — SIGNIFICANT CHANGE UP (ref 0.5–1.3)
EOSINOPHIL # BLD AUTO: 0.02 K/UL — SIGNIFICANT CHANGE UP (ref 0–0.5)
EOSINOPHIL NFR BLD AUTO: 0.9 % — SIGNIFICANT CHANGE UP (ref 0–6)
FIBRINOGEN PPP-MCNC: 902 MG/DL — HIGH (ref 290–520)
GLUCOSE SERPL-MCNC: 114 MG/DL — HIGH (ref 70–99)
HCT VFR BLD CALC: 21.1 % — LOW (ref 39–50)
HGB BLD-MCNC: 7.5 G/DL — LOW (ref 13–17)
INR BLD: 1.07 RATIO — SIGNIFICANT CHANGE UP (ref 0.88–1.16)
LDH SERPL L TO P-CCNC: 154 U/L — SIGNIFICANT CHANGE UP (ref 50–242)
LYMPHOCYTES # BLD AUTO: 1.8 K/UL — SIGNIFICANT CHANGE UP (ref 1–3.3)
LYMPHOCYTES # BLD AUTO: 91.1 % — HIGH (ref 13–44)
MAGNESIUM SERPL-MCNC: 2.1 MG/DL — SIGNIFICANT CHANGE UP (ref 1.6–2.6)
MANUAL SMEAR VERIFICATION: SIGNIFICANT CHANGE UP
MCHC RBC-ENTMCNC: 29.9 PG — SIGNIFICANT CHANGE UP (ref 27–34)
MCHC RBC-ENTMCNC: 35.5 GM/DL — SIGNIFICANT CHANGE UP (ref 32–36)
MCV RBC AUTO: 84.1 FL — SIGNIFICANT CHANGE UP (ref 80–100)
MONOCYTES # BLD AUTO: 0.05 K/UL — SIGNIFICANT CHANGE UP (ref 0–0.9)
MONOCYTES NFR BLD AUTO: 2.7 % — SIGNIFICANT CHANGE UP (ref 2–14)
NEUTROPHILS # BLD AUTO: 0.09 K/UL — LOW (ref 1.8–7.4)
NEUTROPHILS NFR BLD AUTO: 4.4 % — LOW (ref 43–77)
PHOSPHATE SERPL-MCNC: 4.1 MG/DL — SIGNIFICANT CHANGE UP (ref 2.5–4.5)
PLAT MORPH BLD: NORMAL — SIGNIFICANT CHANGE UP
PLATELET # BLD AUTO: 14 K/UL — CRITICAL LOW (ref 150–400)
POTASSIUM SERPL-MCNC: 4.1 MMOL/L — SIGNIFICANT CHANGE UP (ref 3.5–5.3)
POTASSIUM SERPL-SCNC: 4.1 MMOL/L — SIGNIFICANT CHANGE UP (ref 3.5–5.3)
PROT SERPL-MCNC: 7.5 G/DL — SIGNIFICANT CHANGE UP (ref 6–8.3)
PROTHROM AB SERPL-ACNC: 12.8 SEC — SIGNIFICANT CHANGE UP (ref 10.6–13.6)
RBC # BLD: 2.51 M/UL — LOW (ref 4.2–5.8)
RBC # FLD: 11.9 % — SIGNIFICANT CHANGE UP (ref 10.3–14.5)
RBC BLD AUTO: SIGNIFICANT CHANGE UP
SODIUM SERPL-SCNC: 137 MMOL/L — SIGNIFICANT CHANGE UP (ref 135–145)
URATE SERPL-MCNC: 4.8 MG/DL — SIGNIFICANT CHANGE UP (ref 3.4–8.8)
VARIANT LYMPHS # BLD: 0.9 % — SIGNIFICANT CHANGE UP (ref 0–6)
WBC # BLD: 1.98 K/UL — LOW (ref 3.8–10.5)
WBC # FLD AUTO: 1.98 K/UL — LOW (ref 3.8–10.5)

## 2021-12-27 PROCEDURE — 99232 SBSQ HOSP IP/OBS MODERATE 35: CPT

## 2021-12-27 PROCEDURE — 99233 SBSQ HOSP IP/OBS HIGH 50: CPT

## 2021-12-27 RX ORDER — DIPHENHYDRAMINE HCL 50 MG
25 CAPSULE ORAL ONCE
Refills: 0 | Status: DISCONTINUED | OUTPATIENT
Start: 2021-12-27 | End: 2022-01-20

## 2021-12-27 RX ORDER — DIPHENHYDRAMINE HCL 50 MG
25 CAPSULE ORAL ONCE
Refills: 0 | Status: DISCONTINUED | OUTPATIENT
Start: 2021-12-27 | End: 2021-12-27

## 2021-12-27 RX ORDER — ANTI-THYMOCYTE GLOBULIN,RABBIT 25 MG
0.1 VIAL (EA) INTRAVENOUS ONCE
Refills: 0 | Status: COMPLETED | OUTPATIENT
Start: 2021-12-27 | End: 2021-12-27

## 2021-12-27 RX ORDER — EPINEPHRINE 0.3 MG/.3ML
0.3 INJECTION INTRAMUSCULAR; SUBCUTANEOUS ONCE
Refills: 0 | Status: DISCONTINUED | OUTPATIENT
Start: 2021-12-27 | End: 2021-12-27

## 2021-12-27 RX ORDER — EPINEPHRINE 0.3 MG/.3ML
0.3 INJECTION INTRAMUSCULAR; SUBCUTANEOUS ONCE
Refills: 0 | Status: DISCONTINUED | OUTPATIENT
Start: 2021-12-27 | End: 2022-01-20

## 2021-12-27 RX ADMIN — FAMOTIDINE 20 MILLIGRAM(S): 10 INJECTION INTRAVENOUS at 05:55

## 2021-12-27 RX ADMIN — Medication 650 MILLIGRAM(S): at 23:24

## 2021-12-27 RX ADMIN — Medication 0.1 MILLILITER(S): at 17:25

## 2021-12-27 RX ADMIN — CYCLOSPORINE 300 MILLIGRAM(S): 100 CAPSULE ORAL at 17:44

## 2021-12-27 RX ADMIN — Medication 15 MILLILITER(S): at 23:04

## 2021-12-27 RX ADMIN — Medication 650 MILLIGRAM(S): at 11:44

## 2021-12-27 RX ADMIN — LIDOCAINE 2 MILLILITER(S): 4 CREAM TOPICAL at 23:04

## 2021-12-27 RX ADMIN — Medication 400 MILLIGRAM(S): at 13:06

## 2021-12-27 RX ADMIN — Medication 400 MILLIGRAM(S): at 23:04

## 2021-12-27 RX ADMIN — Medication 15 MILLILITER(S): at 05:50

## 2021-12-27 RX ADMIN — Medication 15 MILLILITER(S): at 11:42

## 2021-12-27 RX ADMIN — CHLORHEXIDINE GLUCONATE 15 MILLILITER(S): 213 SOLUTION TOPICAL at 17:44

## 2021-12-27 RX ADMIN — DIPHENHYDRAMINE HYDROCHLORIDE AND LIDOCAINE HYDROCHLORIDE AND ALUMINUM HYDROXIDE AND MAGNESIUM HYDRO 10 MILLILITER(S): KIT at 23:04

## 2021-12-27 RX ADMIN — Medication 1 SPRAY(S): at 05:56

## 2021-12-27 RX ADMIN — Medication 15 MILLILITER(S): at 17:43

## 2021-12-27 RX ADMIN — CEFEPIME 100 MILLIGRAM(S): 1 INJECTION, POWDER, FOR SOLUTION INTRAMUSCULAR; INTRAVENOUS at 23:03

## 2021-12-27 RX ADMIN — FLUCONAZOLE 200 MILLIGRAM(S): 150 TABLET ORAL at 11:43

## 2021-12-27 RX ADMIN — Medication 400 MILLIGRAM(S): at 05:55

## 2021-12-27 RX ADMIN — DIPHENHYDRAMINE HYDROCHLORIDE AND LIDOCAINE HYDROCHLORIDE AND ALUMINUM HYDROXIDE AND MAGNESIUM HYDRO 10 MILLILITER(S): KIT at 05:55

## 2021-12-27 RX ADMIN — CEFEPIME 100 MILLIGRAM(S): 1 INJECTION, POWDER, FOR SOLUTION INTRAMUSCULAR; INTRAVENOUS at 13:05

## 2021-12-27 RX ADMIN — Medication 650 MILLIGRAM(S): at 23:54

## 2021-12-27 RX ADMIN — CHLORHEXIDINE GLUCONATE 15 MILLILITER(S): 213 SOLUTION TOPICAL at 05:50

## 2021-12-27 RX ADMIN — FAMOTIDINE 20 MILLIGRAM(S): 10 INJECTION INTRAVENOUS at 17:43

## 2021-12-27 RX ADMIN — CYCLOSPORINE 300 MILLIGRAM(S): 100 CAPSULE ORAL at 05:50

## 2021-12-27 RX ADMIN — Medication 650 MILLIGRAM(S): at 12:14

## 2021-12-27 RX ADMIN — CEFEPIME 100 MILLIGRAM(S): 1 INJECTION, POWDER, FOR SOLUTION INTRAMUSCULAR; INTRAVENOUS at 05:51

## 2021-12-27 RX ADMIN — LIDOCAINE 2 MILLILITER(S): 4 CREAM TOPICAL at 05:50

## 2021-12-27 NOTE — PROGRESS NOTE ADULT - PROBLEM SELECTOR PLAN 4
12/24-  CT maxillofacial with contrast and CT neck with contrast to r/o abscess. (-)  12/26- pain control., First mouth wash, local Lidocaine adm with Q tips.

## 2021-12-27 NOTE — PROGRESS NOTE ADULT - PROBLEM SELECTOR PLAN 1
Relapsed Aplastic Anemia   S/p ATG, CSA, and promacta with complete remission  BM bx on 11/9/20 revealed hypocellular marrow.  BMbx 12/2021showing erythroid predominance, myeloid and erythroid maturation, and markedly decreased megakaryocytes   Pt states he has not been able to receive Promacta outpatient x~10 days. Sent script to [facility] on 12/22 12/23  transferred  to [facility] for ATG treatment, plan on 12/24.  Monitor CBC with diff/lytes, transfuse and or replete PRN  Monitor weight, I and O, mouth care  12/24- Plan for ATG test dose this week.  12/26- Start Cyclosporine 300 mg PO Q 12 hrs with f/u level on 12/29. Relapsed Aplastic Anemia   S/p ATG, CSA, and promacta with complete remission  BM bx on 11/9/20 revealed hypocellular marrow.  BMbx 12/2021showing erythroid predominance, myeloid and erythroid maturation, and markedly decreased megakaryocytes   Pt states he has not been able to receive Promacta outpatient x~10 days. Sent script to [facility] at [facility] on 12/22 12/23  transferred  to [facility] for ATG treatment, plan on 12/24.  Monitor CBC with diff/lytes, transfuse and or replete PRN  Monitor weight, I and O, mouth care  12/26- Start Cyclosporine 300 mg PO Q 12 hrs with f/u level on 12/29.  12/27 test dose today

## 2021-12-27 NOTE — PROGRESS NOTE ADULT - SUBJECTIVE AND OBJECTIVE BOX
Diagnosis: Relapsed aplastic anemia    Protocol/Chemo Regimen: pending rabbit ATG    Day: TBA     Pt endorsed: right lower gum discomfort/pain- improved     Review of Systems: Denies any chest pain ,palpitation, SOB, nausea, vomiting or diarrhea.    Pain scale:    2-3/10         Location: right lower gum     Diet: regular    Allergies: No Known Allergies    ANTIMICROBIALS  acyclovir   Oral Tab/Cap 400 milliGRAM(s) Oral every 8 hours  cefepime   IVPB 2000 milliGRAM(s) IV Intermittent every 8 hours  fluconAZOLE   Tablet 200 milliGRAM(s) Oral daily  vancomycin  IVPB      vancomycin  IVPB 1000 milliGRAM(s) IV Intermittent every 12 hours    STANDING MEDICATIONS  Biotene Dry Mouth Oral Rinse 15 milliLiter(s) Swish and Spit four times a day  famotidine    Tablet 20 milliGRAM(s) Oral two times a day  FIRST- Mouthwash  BLM 10 milliLiter(s) Swish and Spit four times a day  sodium chloride 0.65% Nasal 1 Spray(s) Both Nostrils four times a day    PRN MEDICATIONS  acetaminophen     Tablet .. 650 milliGRAM(s) Oral every 6 hours PRN  Vital Signs Last 24 Hrs  T(C): 36.8 (27 Dec 2021 04:55), Max: 36.9 (26 Dec 2021 13:10)  T(F): 98.2 (27 Dec 2021 04:55), Max: 98.4 (26 Dec 2021 13:10)  HR: 88 (27 Dec 2021 04:55) (82 - 91)  BP: 121/74 (27 Dec 2021 04:55) (116/71 - 138/77)  BP(mean): --  RR: 18 (27 Dec 2021 04:55) (16 - 18)  SpO2: 98% (27 Dec 2021 04:55) (97% - 99%)    PHYSICAL EXAM  General: adult in NAD  HEENT: clear oropharynx, anicteric sclera, tenderness + in right submandibular area.  CV: normal S1/S2 RRR  Lungs: positive air movement b/l ant lungs,clear to auscultation, no wheezes, no rales  Abdomen: soft, + BS,  non-tender non-distended  Ext: no edema in BLE   Skin: no rash  Neuro: alert and oriented X 3  Central Line: PIV CDI    RECENT CULTURES:  12-23 @ 17:32  Throat Throat  No Streptococcus pyogenes (Group A) isolated    12-23 @ 14:58  .Throat Throat  No Streptococcus pyogenes (Group A) isolated    12-23 @ 09:16  .Blood Blood-Peripheral  No growth to date.    12-22 @ 01:14  Clean Catch Clean Catch (Midstream)  No growth    12-21 @ 17:15  .Blood Blood  No growth to date.    LABS:                          7.5    1.98  )-----------( 14       ( 27 Dec 2021 07:15 )             21.1         Mean Cell Volume : 84.1 fl  Mean Cell Hemoglobin : 29.9 pg  Mean Cell Hemoglobin Concentration : 35.5 gm/dL  Auto Neutrophil # : 0.09 K/uL  Auto Lymphocyte # : 1.80 K/uL  Auto Monocyte # : 0.05 K/uL  Auto Eosinophil # : 0.02 K/uL  Auto Basophil # : 0.00 K/uL  Auto Neutrophil % : 4.4 %  Auto Lymphocyte % : 91.1 %  Auto Monocyte % : 2.7 %  Auto Eosinophil % : 0.9 %  Auto Basophil % : 0.0 %      12-27    137  |  104  |  21  ----------------------------<  114<H>  4.1   |  23  |  1.02    Ca    9.3      27 Dec 2021 07:15  Phos  4.1     12-27  Mg     2.1     12-27    TPro  7.5  /  Alb  4.4  /  TBili  0.4  /  DBili  x   /  AST  21  /  ALT  41  /  AlkPhos  79  12-27      PT/INR - ( 27 Dec 2021 07:15 )   PT: 12.8 sec;   INR: 1.07 ratio         PTT - ( 27 Dec 2021 07:15 )  PTT:31.0 sec      Uric Acid 4.8          RADIOLOGY & ADDITIONAL STUDIES:  CT Neck Soft Tissue w/wo IV Cont (12.25.21 @ 12:00) >  Moderate mucosal thickening within the LEFT maxillary,   BILATERAL ethmoid and frontal sinuses. Minimal increased density seen   within the BILATERAL frontal sinuses which may reflect inspissated   secretions or possibly fungal material, less likely hemorrhage.   Minimal   reactive lymph nodes noted..         Diagnosis: Relapsed aplastic anemia    Protocol/Chemo Regimen: pending rabbit ATG    Day: TBA     Pt endorsed: right lower gum discomfort/pain- improved     Review of Systems: Denies any chest pain ,palpitation, SOB, nausea, vomiting or diarrhea.    Pain scale:    2-3/10         Location: right lower gum     Diet: regular    Allergies: No Known Allergies    ANTIMICROBIALS  acyclovir   Oral Tab/Cap 400 milliGRAM(s) Oral every 8 hours  cefepime   IVPB 2000 milliGRAM(s) IV Intermittent every 8 hours  fluconAZOLE   Tablet 200 milliGRAM(s) Oral daily      STANDING MEDICATIONS  Biotene Dry Mouth Oral Rinse 15 milliLiter(s) Swish and Spit four times a day  famotidine    Tablet 20 milliGRAM(s) Oral two times a day  FIRST- Mouthwash  BLM 10 milliLiter(s) Swish and Spit four times a day  sodium chloride 0.65% Nasal 1 Spray(s) Both Nostrils four times a day    PRN MEDICATIONS  acetaminophen     Tablet .. 650 milliGRAM(s) Oral every 6 hours PRN  Vital Signs Last 24 Hrs  T(C): 36.8 (27 Dec 2021 04:55), Max: 36.9 (26 Dec 2021 13:10)  T(F): 98.2 (27 Dec 2021 04:55), Max: 98.4 (26 Dec 2021 13:10)  HR: 88 (27 Dec 2021 04:55) (82 - 91)  BP: 121/74 (27 Dec 2021 04:55) (116/71 - 138/77)  BP(mean): --  RR: 18 (27 Dec 2021 04:55) (16 - 18)  SpO2: 98% (27 Dec 2021 04:55) (97% - 99%)    PHYSICAL EXAM  General: adult in NAD  HEENT: clear oropharynx, anicteric sclera, tenderness + in right submandibular area.  CV: normal S1/S2 RRR  Lungs: positive air movement b/l ant lungs,clear to auscultation, no wheezes, no rales  Abdomen: soft, + BS,  non-tender non-distended  Ext: no edema in BLE   Skin: no rash  Neuro: alert and oriented X 3  Central Line: PIV CDI    RECENT CULTURES:  12-23 @ 17:32  Throat Throat  No Streptococcus pyogenes (Group A) isolated    12-23 @ 14:58  .Throat Throat  No Streptococcus pyogenes (Group A) isolated    12-23 @ 09:16  .Blood Blood-Peripheral  No growth to date.    12-22 @ 01:14  Clean Catch Clean Catch (Midstream)  No growth    12-21 @ 17:15  .Blood Blood  No growth to date.    LABS:                          7.5    1.98  )-----------( 14       ( 27 Dec 2021 07:15 )             21.1         Mean Cell Volume : 84.1 fl  Mean Cell Hemoglobin : 29.9 pg  Mean Cell Hemoglobin Concentration : 35.5 gm/dL  Auto Neutrophil # : 0.09 K/uL  Auto Lymphocyte # : 1.80 K/uL  Auto Monocyte # : 0.05 K/uL  Auto Eosinophil # : 0.02 K/uL  Auto Basophil # : 0.00 K/uL  Auto Neutrophil % : 4.4 %  Auto Lymphocyte % : 91.1 %  Auto Monocyte % : 2.7 %  Auto Eosinophil % : 0.9 %  Auto Basophil % : 0.0 %      12-27    137  |  104  |  21  ----------------------------<  114<H>  4.1   |  23  |  1.02    Ca    9.3      27 Dec 2021 07:15  Phos  4.1     12-27  Mg     2.1     12-27    TPro  7.5  /  Alb  4.4  /  TBili  0.4  /  DBili  x   /  AST  21  /  ALT  41  /  AlkPhos  79  12-27      PT/INR - ( 27 Dec 2021 07:15 )   PT: 12.8 sec;   INR: 1.07 ratio         PTT - ( 27 Dec 2021 07:15 )  PTT:31.0 sec      Uric Acid 4.8          RADIOLOGY & ADDITIONAL STUDIES:  CT Neck Soft Tissue w/wo IV Cont (12.25.21 @ 12:00) >  Moderate mucosal thickening within the LEFT maxillary,   BILATERAL ethmoid and frontal sinuses. Minimal increased density seen   within the BILATERAL frontal sinuses which may reflect inspissated   secretions or possibly fungal material, less likely hemorrhage.   Minimal   reactive lymph nodes noted..         Diagnosis: Relapsed aplastic anemia    Protocol/Chemo Regimen: pending rabbit ATG    Day: TBA     Pt endorsed: right lower gum discomfort/pain- improved     Review of Systems: Denies any chest pain ,palpitation, SOB, nausea, vomiting or diarrhea.    Pain scale:    2-3/10         Location: right lower gum     Diet: regular    Allergies: No Known Allergies    ANTIMICROBIALS  acyclovir   Oral Tab/Cap 400 milliGRAM(s) Oral every 8 hours  cefepime   IVPB 2000 milliGRAM(s) IV Intermittent every 8 hours  fluconAZOLE   Tablet 200 milliGRAM(s) Oral daily      STANDING MEDICATIONS  Biotene Dry Mouth Oral Rinse 15 milliLiter(s) Swish and Spit four times a day  famotidine    Tablet 20 milliGRAM(s) Oral two times a day  FIRST- Mouthwash  BLM 10 milliLiter(s) Swish and Spit four times a day  sodium chloride 0.65% Nasal 1 Spray(s) Both Nostrils four times a day    PRN MEDICATIONS  acetaminophen     Tablet .. 650 milliGRAM(s) Oral every 6 hours PRN  Vital Signs Last 24 Hrs  T(C): 36.8 (27 Dec 2021 04:55), Max: 36.9 (26 Dec 2021 13:10)  T(F): 98.2 (27 Dec 2021 04:55), Max: 98.4 (26 Dec 2021 13:10)  HR: 88 (27 Dec 2021 04:55) (82 - 91)  BP: 121/74 (27 Dec 2021 04:55) (116/71 - 138/77)  BP(mean): --  RR: 18 (27 Dec 2021 04:55) (16 - 18)  SpO2: 98% (27 Dec 2021 04:55) (97% - 99%)    PHYSICAL EXAM  General: adult in NAD  HEENT: clear oropharynx, anicteric sclera, tenderness + in right submandibular area.  CV: normal S1/S2 RRR  Lungs: positive air movement b/l ant lungs,clear to auscultation, no wheezes, no rales  Abdomen: soft, + BS,  non-tender non-distended  Ext: no edema in BLE   Skin: no rash  Neuro: alert and oriented X 3  Central Line: PIV CDI        LABS:                          7.5    1.98  )-----------( 14       ( 27 Dec 2021 07:15 )             21.1         Mean Cell Volume : 84.1 fl  Mean Cell Hemoglobin : 29.9 pg  Mean Cell Hemoglobin Concentration : 35.5 gm/dL  Auto Neutrophil # : 0.09 K/uL  Auto Lymphocyte # : 1.80 K/uL  Auto Monocyte # : 0.05 K/uL  Auto Eosinophil # : 0.02 K/uL  Auto Basophil # : 0.00 K/uL  Auto Neutrophil % : 4.4 %  Auto Lymphocyte % : 91.1 %  Auto Monocyte % : 2.7 %  Auto Eosinophil % : 0.9 %  Auto Basophil % : 0.0 %      12-27    137  |  104  |  21  ----------------------------<  114<H>  4.1   |  23  |  1.02    Ca    9.3      27 Dec 2021 07:15  Phos  4.1     12-27  Mg     2.1     12-27    TPro  7.5  /  Alb  4.4  /  TBili  0.4  /  DBili  x   /  AST  21  /  ALT  41  /  AlkPhos  79  12-27      PT/INR - ( 27 Dec 2021 07:15 )   PT: 12.8 sec;   INR: 1.07 ratio         PTT - ( 27 Dec 2021 07:15 )  PTT:31.0 sec      Uric Acid 4.8    RECENT CULTURES:  12-23 @ 17:32  Throat Throat  No Streptococcus pyogenes (Group A) isolated    12-23 @ 14:58  .Throat Throat  No Streptococcus pyogenes (Group A) isolated    12-23 @ 09:16  .Blood Blood-Peripheral  No growth to date.    12-22 @ 01:14  Clean Catch Clean Catch (Midstream)  No growth    12-21 @ 17:15  .Blood Blood  No growth to date.      RADIOLOGY & ADDITIONAL STUDIES:  CT Neck Soft Tissue w/wo IV Cont (12.25.21 @ 12:00) >  Moderate mucosal thickening within the LEFT maxillary,   BILATERAL ethmoid and frontal sinuses. Minimal increased density seen   within the BILATERAL frontal sinuses which may reflect inspissated   secretions or possibly fungal material, less likely hemorrhage.   Minimal   reactive lymph nodes noted..

## 2021-12-27 NOTE — PROGRESS NOTE ADULT - ASSESSMENT
36M with Aplastic Anemia diagnosed April 2020.  Underwent treatment with equine ATG, Cyclosporine, and Promacta.  Went into remission (BM bx 5/2021 with normal morphology and cellularity).  Promacta stopped May 2021.  Cyclosporin stopped August.  By September, platelets started to drop.  Now with pancytopenia and neutropenic fever.  Dental issue?  still complains of severe R posterior gum pain.  CT neck noted as well    Neutropenic fever  - continue cefepime  - would have dental come re-eval the patient  - monitor for further fevers  - if fever recurs, would change diflucan to voriconazole    I have discussed plan of care as detailed above with heme/onc NP

## 2021-12-27 NOTE — PROGRESS NOTE ADULT - ASSESSMENT
36M with Aplastic Anemia diagnosed April 2020, s/p equine ATG, Cyclosporine, and Promacta. Bone marrow biopsy was done on 5/2021 revealed normal morphology and cellularity, eventually Promacta was stopped and Cyclosporin in 5/2021, in September 2021 noted his platelets dropping  now admitted with neutropenic fever found to have pancytopenia secondary to disease condition.  Bone marrow biopsy was repeated on 12/2021 showed decreased megakaryocytes with tentative plan to start ATG this week.

## 2021-12-27 NOTE — PROGRESS NOTE ADULT - SUBJECTIVE AND OBJECTIVE BOX
Patient is a 36y old  Male who presents with a chief complaint of nosebleed and fever (22 Dec 2021 14:22)    f/u neutropenic fever    Interval History/ROS:  remains afebrile.  no n/v/d.  no sore throat but does continue to have R posterior lower gum pain. no abdominal pain.  no dysuria.  Remainder of ROS otherwise negative.    PAST MEDICAL & SURGICAL HISTORY:  Aplastic Anemia  Asthma, stable  Redundant prepuce and phimosis  H/O circumcision    Allergies  No Known Allergies    ANTIMICROBIALS:  acyclovir   Oral Tab/Cap 400 every 8 hours  cefepime   IVPB 2000 every 8 hours (-)  fluconAZOLE   Tablet 200 daily    MEDICATIONS  (STANDING):  cycloSPORINE  (SandIMMUNE) 300 every 12 hours  famotidine    Tablet 20 two times a day    Vital Signs Last 24 Hrs  T(F): 98.2 (21 @ 10:06), Max: 98.4 (21 @ 13:10)  HR: 108 (21 @ 10:06)  BP: 136/74 (21 @ 10:06)  RR: 18 (21 @ 10:06)  SpO2: 100% (21 @ 10:06) (97% - 100%)    PHYSICAL EXAM:  Constitutional: non-toxic  HEAD/EYES: anicteric  ENT:  supple, R lower posterior gum ? ulcer  Cardiovascular:   normal S1, S2  Respiratory:  clear BS bilaterally  GI:  soft, non-tender, normal bowel sounds  :  no richter  Musculoskeletal:  no synovitis,  Neurologic: awake and alert, normal strength, no focal findings  Skin:  several small ecchymoses  Psychiatric:  awake, alert, appropriate mood                                  7.5    1.98  )-----------( 14       ( 27 Dec 2021 07:15 )             21.1     137  |  104  |  21  ----------------------------<  114  4.1   |  23  |  1.02  Ca    9.3      27 Dec 2021 07:15Phos  4.1     Mg     2.1       TPro  7.5  /  Alb  4.4  /  TBili  0.4  /  DBili  x   /  AST  21  /  ALT  41  /  AlkPhos  79      Auto Neutrophil #: 0.09 K/uL (21 @ 07:15)  Auto Neutrophil #: 0.13 K/uL (21 @ 07:42)  Auto Neutrophil #: 0.10 K/uL (21 @ 07:14)    Urinalysis Basic - ( 21 Dec 2021 20:53 )  Color: Yellow / Appearance: Clear / S.034 / pH: x  Gluc: x / Ketone: Negative  / Bili: Negative / Urobili: Negative   Blood: x / Protein: 30 mg/dL / Nitrite: Negative   Leuk Esterase: Negative / RBC: 2 /hpf / WBC 1 /HPF   Sq Epi: x / Non Sq Epi: 0 /hpf / Bacteria: Negative    MICROBIOLOGY:  Culture - Group A Streptococcus (collected 21 @ 17:32)  Source: .Throat Throat  Final Report (21 @ 08:04):    No Streptococcus pyogenes (Group A) isolated    Culture - Group A Streptococcus (collected 21 @ 14:58)  Source: .Throat Throat  Final Report (21 @ 08:00):    No Streptococcus pyogenes (Group A) isolated    Culture - Blood (collected 21 @ 09:16)  Source: .Blood Blood-Venous  Preliminary Report (21 @ 10:01):    No growth to date.    Culture - Blood (collected 21 @ 09:16)  Source: .Blood Blood-Peripheral  Preliminary Report (21 @ 10:01):    No growth to date.    Culture - Urine (collected 21 @ 01:14)  Source: Clean Catch Clean Catch (Midstream)  Final Report (21 @ 21:12):    No growth    Culture - Blood (collected 21 @ 17:15)  Source: .Blood Blood  Final Report (21 @ 18:00):    No Growth Final    Culture - Blood (collected 21 @ 17:15)  Source: .Blood Blood  Final Report (21 @ 18:00):    No Growth Final    Rapid RVP Result: NotDetec ( @ 13:37)    RADIOLOGY:  imaging below personally reviewed and agree with findings    CT Neck Soft Tissue w/wo IV Cont (21 @ 12:00) >  IMPRESSION: Moderate mucosal thickening within the LEFT maxillary, BILATERAL ethmoid and frontal sinuses. Minimal increased density seen within the BILATERAL frontal sinuses which may reflect inspissated secretions or possibly fungal material, less likely hemorrhage.   Minimal reactive lymph nodes noted..    CT Abdomen and Pelvis w/ IV Cont (21 @ 20:04) >  IMPRESSION: No CT evidence for source of infection in the chest, abdomen or pelvis.     Xray Chest 1 View- PORTABLE-Urgent (Xray Chest 1 View- PORTABLE-Urgent .) (21 @ 14:08) >  IMPRESSION:  No active pulmonary disease.

## 2021-12-27 NOTE — PROGRESS NOTE ADULT - PROBLEM SELECTOR PLAN 2
Neutropenic, afebrile   continue cefepime for now  throat culture (-).  MRSA (-), staph aureus PCR +.  f/u all cultures, last on 12/21, NGTD  CT CAP  to look for source of infection- No CT evidence for source of infection in the chest, abdomen or pelvis.  12/24- Staph aureus PCR detected, will start Vanco 1 gm Q 12 hrs.  12/26- Vanco discontinued on 12/26 as patient continue to be afebrile and CT maxillo facial did not reveal any abscess. Neutropenic, afebrile   continue cefepime , diflucan, Acyclovir for now  throat culture (-).  MRSA (-), staph aureus PCR +.  CT CAP  to look for source of infection- No CT evidence for source of infection in the chest, abdomen or pelvis.  12/24- Staph aureus PCR detected, will start Vanco 1 gm Q 12 hrs.  12/26- Vanco discontinued on 12/26 as patient continue to be afebrile and CT maxillo facial did not reveal any abscess.

## 2021-12-27 NOTE — PROGRESS NOTE ADULT - ATTENDING COMMENTS
36M with Aplastic Anemia diagnosed April 2020.  Underwent treatment with equine ATG, Cyclosporine, and Promacta.  Went into remission (BM bx 5/2021 with normal morphology and cellularity).  Promacta stopped May 2021.  Cyclosporin stopped August.  By September, platelets started to drop.  Now with pancytopenia and neutropenic fever.  No obvious focus of infection, however, c/o sorethroat.  -Patient with ongoing fevers blood cultures negative - continue cefepime. Nasal MRSA + for staph aureus (not detected though for MRSA). Will start vancomycin.   -Dental consult with x-rays, no dental source of patient's R sided gum pain. CT scan showed some sinus opacification but asymptomatic from that standpoint.  -Discussed with patient today about the possibility of going straight to allogeneic transplant, he has 30 year old sister. Have discussed transplant with patient extensively in the past.   -Afebrile now for over 24 hours. Discontinued vancomycin.   -Will restart cyclosporine as 300 mg BID and check level Wednesday morning.   -Possible rabbit ATG, will revisit daily but prefer transplant. Patient reported he plans to speak to his sister today. Will continue to follow up daily about this. 36M with Aplastic Anemia diagnosed April 2020.  Underwent treatment with equine ATG, Cyclosporine, and Promacta.  Went into remission (BM bx 5/2021 with normal morphology and cellularity).  Promacta stopped May 2021.  Cyclosporin stopped August 2021.  By September, platelets started to drop.  Now with pancytopenia and neutropenic fever.  No obvious focus of infection, however, c/o sorethroat.  On cyclosporine 300 mg BID (started on 12/26/21), first level on 12/29/21 -- PENDING  Today is day 2    - Febrile on 12/23/21, on cefepime and fluconazole, blood cultures negative, continue cefepime for now  - Nasal MRSA + for staph aureus (not detected though for MRSA). Will start vancomycin.   - Dental consult with x-rays, no dental source of patient's R sided gum pain. CT scan showed some sinus opacification but asymptomatic from that standpoint.  - Discussed with patient today about the possibility of going straight to allogeneic transplant, he has 30 year old sister. Have discussed transplant with patient extensively in the past.   - Afebrile now for over 24 hours. Discontinued vancomycin.   - Will restart cyclosporine as 300 mg BID and check level Wednesday morning.   - Possible rabbit ATG, will revisit daily but prefer transplant. Patient reported he plans to speak to his sister today. Will continue to follow up daily about this.

## 2021-12-28 ENCOUNTER — APPOINTMENT (OUTPATIENT)
Dept: INFUSION THERAPY | Facility: HOSPITAL | Age: 36
End: 2021-12-28

## 2021-12-28 ENCOUNTER — APPOINTMENT (OUTPATIENT)
Dept: HEMATOLOGY ONCOLOGY | Facility: CLINIC | Age: 36
End: 2021-12-28

## 2021-12-28 LAB
ALBUMIN SERPL ELPH-MCNC: 4.5 G/DL — SIGNIFICANT CHANGE UP (ref 3.3–5)
ALP SERPL-CCNC: 77 U/L — SIGNIFICANT CHANGE UP (ref 40–120)
ALT FLD-CCNC: 35 U/L — SIGNIFICANT CHANGE UP (ref 10–45)
ANION GAP SERPL CALC-SCNC: 13 MMOL/L — SIGNIFICANT CHANGE UP (ref 5–17)
APTT BLD: 30.4 SEC — SIGNIFICANT CHANGE UP (ref 27.5–35.5)
AST SERPL-CCNC: 15 U/L — SIGNIFICANT CHANGE UP (ref 10–40)
BASOPHILS # BLD AUTO: 0.01 K/UL — SIGNIFICANT CHANGE UP (ref 0–0.2)
BASOPHILS NFR BLD AUTO: 0.9 % — SIGNIFICANT CHANGE UP (ref 0–2)
BILIRUB SERPL-MCNC: 0.5 MG/DL — SIGNIFICANT CHANGE UP (ref 0.2–1.2)
BUN SERPL-MCNC: 25 MG/DL — HIGH (ref 7–23)
CALCIUM SERPL-MCNC: 9.3 MG/DL — SIGNIFICANT CHANGE UP (ref 8.4–10.5)
CHLORIDE SERPL-SCNC: 104 MMOL/L — SIGNIFICANT CHANGE UP (ref 96–108)
CO2 SERPL-SCNC: 21 MMOL/L — LOW (ref 22–31)
CREAT SERPL-MCNC: 0.96 MG/DL — SIGNIFICANT CHANGE UP (ref 0.5–1.3)
CULTURE RESULTS: SIGNIFICANT CHANGE UP
CULTURE RESULTS: SIGNIFICANT CHANGE UP
EOSINOPHIL # BLD AUTO: 0 K/UL — SIGNIFICANT CHANGE UP (ref 0–0.5)
EOSINOPHIL NFR BLD AUTO: 0 % — SIGNIFICANT CHANGE UP (ref 0–6)
FIBRINOGEN PPP-MCNC: 836 MG/DL — HIGH (ref 290–520)
GLUCOSE SERPL-MCNC: 117 MG/DL — HIGH (ref 70–99)
HCT VFR BLD CALC: 21.2 % — LOW (ref 39–50)
HGB BLD-MCNC: 7.7 G/DL — LOW (ref 13–17)
INR BLD: 1.14 RATIO — SIGNIFICANT CHANGE UP (ref 0.88–1.16)
LDH SERPL L TO P-CCNC: 157 U/L — SIGNIFICANT CHANGE UP (ref 50–242)
LYMPHOCYTES # BLD AUTO: 1.47 K/UL — SIGNIFICANT CHANGE UP (ref 1–3.3)
LYMPHOCYTES # BLD AUTO: 90.9 % — HIGH (ref 13–44)
MAGNESIUM SERPL-MCNC: 2 MG/DL — SIGNIFICANT CHANGE UP (ref 1.6–2.6)
MANUAL SMEAR VERIFICATION: SIGNIFICANT CHANGE UP
MCHC RBC-ENTMCNC: 29.8 PG — SIGNIFICANT CHANGE UP (ref 27–34)
MCHC RBC-ENTMCNC: 36.3 GM/DL — HIGH (ref 32–36)
MCV RBC AUTO: 82.2 FL — SIGNIFICANT CHANGE UP (ref 80–100)
MONOCYTES # BLD AUTO: 0.03 K/UL — SIGNIFICANT CHANGE UP (ref 0–0.9)
MONOCYTES NFR BLD AUTO: 1.8 % — LOW (ref 2–14)
NEUTROPHILS # BLD AUTO: 0.1 K/UL — LOW (ref 1.8–7.4)
NEUTROPHILS NFR BLD AUTO: 6.4 % — LOW (ref 43–77)
NRBC # BLD: 2 /100 — HIGH (ref 0–0)
PHOSPHATE SERPL-MCNC: 3.1 MG/DL — SIGNIFICANT CHANGE UP (ref 2.5–4.5)
PLAT MORPH BLD: NORMAL — SIGNIFICANT CHANGE UP
PLATELET # BLD AUTO: 37 K/UL — LOW (ref 150–400)
PLATELET # BLD AUTO: 8 K/UL — CRITICAL LOW (ref 150–400)
POTASSIUM SERPL-MCNC: 4.2 MMOL/L — SIGNIFICANT CHANGE UP (ref 3.5–5.3)
POTASSIUM SERPL-SCNC: 4.2 MMOL/L — SIGNIFICANT CHANGE UP (ref 3.5–5.3)
PROT SERPL-MCNC: 7.6 G/DL — SIGNIFICANT CHANGE UP (ref 6–8.3)
PROTHROM AB SERPL-ACNC: 13.6 SEC — SIGNIFICANT CHANGE UP (ref 10.6–13.6)
RBC # BLD: 2.58 M/UL — LOW (ref 4.2–5.8)
RBC # FLD: 11.9 % — SIGNIFICANT CHANGE UP (ref 10.3–14.5)
RBC BLD AUTO: SIGNIFICANT CHANGE UP
SARS-COV-2 RNA SPEC QL NAA+PROBE: SIGNIFICANT CHANGE UP
SODIUM SERPL-SCNC: 138 MMOL/L — SIGNIFICANT CHANGE UP (ref 135–145)
SPECIMEN SOURCE: SIGNIFICANT CHANGE UP
SPECIMEN SOURCE: SIGNIFICANT CHANGE UP
URATE SERPL-MCNC: 5 MG/DL — SIGNIFICANT CHANGE UP (ref 3.4–8.8)
WBC # BLD: 1.62 K/UL — LOW (ref 3.8–10.5)
WBC # FLD AUTO: 1.62 K/UL — LOW (ref 3.8–10.5)

## 2021-12-28 PROCEDURE — 99232 SBSQ HOSP IP/OBS MODERATE 35: CPT

## 2021-12-28 PROCEDURE — 71045 X-RAY EXAM CHEST 1 VIEW: CPT | Mod: 26

## 2021-12-28 PROCEDURE — 99233 SBSQ HOSP IP/OBS HIGH 50: CPT | Mod: GC

## 2021-12-28 RX ORDER — ACETAMINOPHEN 500 MG
650 TABLET ORAL EVERY 6 HOURS
Refills: 0 | Status: DISCONTINUED | OUTPATIENT
Start: 2021-12-28 | End: 2022-01-20

## 2021-12-28 RX ORDER — HYDROCORTISONE 20 MG
50 TABLET ORAL EVERY 12 HOURS
Refills: 0 | Status: DISCONTINUED | OUTPATIENT
Start: 2021-12-28 | End: 2022-01-20

## 2021-12-28 RX ORDER — CALCIUM CARBONATE 500(1250)
1 TABLET ORAL EVERY 4 HOURS
Refills: 0 | Status: DISCONTINUED | OUTPATIENT
Start: 2021-12-28 | End: 2022-01-20

## 2021-12-28 RX ORDER — CHLORHEXIDINE GLUCONATE 213 G/1000ML
1 SOLUTION TOPICAL
Refills: 0 | Status: DISCONTINUED | OUTPATIENT
Start: 2021-12-28 | End: 2022-01-20

## 2021-12-28 RX ORDER — PANTOPRAZOLE SODIUM 20 MG/1
40 TABLET, DELAYED RELEASE ORAL
Refills: 0 | Status: DISCONTINUED | OUTPATIENT
Start: 2021-12-28 | End: 2022-01-11

## 2021-12-28 RX ORDER — ACETAMINOPHEN 500 MG
650 TABLET ORAL DAILY
Refills: 0 | Status: COMPLETED | OUTPATIENT
Start: 2021-12-28 | End: 2022-01-01

## 2021-12-28 RX ORDER — ELTROMBOPAG OLAMINE 50 MG/1
150 TABLET, FILM COATED ORAL DAILY
Refills: 0 | Status: DISCONTINUED | OUTPATIENT
Start: 2021-12-28 | End: 2022-01-20

## 2021-12-28 RX ORDER — SODIUM CHLORIDE 9 MG/ML
1000 INJECTION INTRAMUSCULAR; INTRAVENOUS; SUBCUTANEOUS
Refills: 0 | Status: DISCONTINUED | OUTPATIENT
Start: 2021-12-28 | End: 2022-01-20

## 2021-12-28 RX ORDER — DIPHENHYDRAMINE HCL 50 MG
50 CAPSULE ORAL DAILY
Refills: 0 | Status: COMPLETED | OUTPATIENT
Start: 2021-12-28 | End: 2022-01-01

## 2021-12-28 RX ORDER — ELTROMBOPAG OLAMINE 50 MG/1
150 TABLET, FILM COATED ORAL DAILY
Refills: 0 | Status: DISCONTINUED | OUTPATIENT
Start: 2021-12-28 | End: 2021-12-28

## 2021-12-28 RX ORDER — DIPHENHYDRAMINE HCL 50 MG
25 CAPSULE ORAL EVERY 12 HOURS
Refills: 0 | Status: DISCONTINUED | OUTPATIENT
Start: 2021-12-28 | End: 2021-12-28

## 2021-12-28 RX ORDER — DIPHENHYDRAMINE HCL 50 MG
25 CAPSULE ORAL EVERY 12 HOURS
Refills: 0 | Status: DISCONTINUED | OUTPATIENT
Start: 2021-12-28 | End: 2022-01-20

## 2021-12-28 RX ADMIN — CYCLOSPORINE 300 MILLIGRAM(S): 100 CAPSULE ORAL at 17:07

## 2021-12-28 RX ADMIN — DIPHENHYDRAMINE HYDROCHLORIDE AND LIDOCAINE HYDROCHLORIDE AND ALUMINUM HYDROXIDE AND MAGNESIUM HYDRO 10 MILLILITER(S): KIT at 11:15

## 2021-12-28 RX ADMIN — LIDOCAINE 2 MILLILITER(S): 4 CREAM TOPICAL at 17:07

## 2021-12-28 RX ADMIN — Medication 400 MILLIGRAM(S): at 14:11

## 2021-12-28 RX ADMIN — FLUCONAZOLE 200 MILLIGRAM(S): 150 TABLET ORAL at 11:16

## 2021-12-28 RX ADMIN — CEFEPIME 100 MILLIGRAM(S): 1 INJECTION, POWDER, FOR SOLUTION INTRAMUSCULAR; INTRAVENOUS at 22:47

## 2021-12-28 RX ADMIN — PANTOPRAZOLE SODIUM 40 MILLIGRAM(S): 20 TABLET, DELAYED RELEASE ORAL at 22:45

## 2021-12-28 RX ADMIN — ELTROMBOPAG OLAMINE 150 MILLIGRAM(S): 50 TABLET, FILM COATED ORAL at 12:18

## 2021-12-28 RX ADMIN — CHLORHEXIDINE GLUCONATE 15 MILLILITER(S): 213 SOLUTION TOPICAL at 05:54

## 2021-12-28 RX ADMIN — FAMOTIDINE 20 MILLIGRAM(S): 10 INJECTION INTRAVENOUS at 17:07

## 2021-12-28 RX ADMIN — CYCLOSPORINE 300 MILLIGRAM(S): 100 CAPSULE ORAL at 05:56

## 2021-12-28 RX ADMIN — SODIUM CHLORIDE 50 MILLILITER(S): 9 INJECTION INTRAMUSCULAR; INTRAVENOUS; SUBCUTANEOUS at 22:43

## 2021-12-28 RX ADMIN — Medication 400 MILLIGRAM(S): at 22:44

## 2021-12-28 RX ADMIN — Medication 650 MILLIGRAM(S): at 17:11

## 2021-12-28 RX ADMIN — Medication 650 MILLIGRAM(S): at 18:05

## 2021-12-28 RX ADMIN — Medication 15 MILLILITER(S): at 17:10

## 2021-12-28 RX ADMIN — DIPHENHYDRAMINE HYDROCHLORIDE AND LIDOCAINE HYDROCHLORIDE AND ALUMINUM HYDROXIDE AND MAGNESIUM HYDRO 10 MILLILITER(S): KIT at 17:07

## 2021-12-28 RX ADMIN — Medication 15 MILLILITER(S): at 23:00

## 2021-12-28 RX ADMIN — LIDOCAINE 2 MILLILITER(S): 4 CREAM TOPICAL at 05:55

## 2021-12-28 RX ADMIN — ELTROMBOPAG OLAMINE 150 MILLIGRAM(S): 50 TABLET, FILM COATED ORAL at 22:43

## 2021-12-28 RX ADMIN — Medication 15 MILLILITER(S): at 05:51

## 2021-12-28 RX ADMIN — Medication 15 MILLILITER(S): at 11:24

## 2021-12-28 RX ADMIN — Medication 25 MILLIGRAM(S): at 11:48

## 2021-12-28 RX ADMIN — CEFEPIME 100 MILLIGRAM(S): 1 INJECTION, POWDER, FOR SOLUTION INTRAMUSCULAR; INTRAVENOUS at 14:12

## 2021-12-28 RX ADMIN — FAMOTIDINE 20 MILLIGRAM(S): 10 INJECTION INTRAVENOUS at 05:56

## 2021-12-28 RX ADMIN — LIDOCAINE 2 MILLILITER(S): 4 CREAM TOPICAL at 11:16

## 2021-12-28 RX ADMIN — CHLORHEXIDINE GLUCONATE 1 APPLICATION(S): 213 SOLUTION TOPICAL at 06:00

## 2021-12-28 RX ADMIN — Medication 400 MILLIGRAM(S): at 05:56

## 2021-12-28 RX ADMIN — Medication 650 MILLIGRAM(S): at 11:18

## 2021-12-28 RX ADMIN — CHLORHEXIDINE GLUCONATE 15 MILLILITER(S): 213 SOLUTION TOPICAL at 17:06

## 2021-12-28 RX ADMIN — CEFEPIME 100 MILLIGRAM(S): 1 INJECTION, POWDER, FOR SOLUTION INTRAMUSCULAR; INTRAVENOUS at 05:56

## 2021-12-28 RX ADMIN — DIPHENHYDRAMINE HYDROCHLORIDE AND LIDOCAINE HYDROCHLORIDE AND ALUMINUM HYDROXIDE AND MAGNESIUM HYDRO 10 MILLILITER(S): KIT at 05:55

## 2021-12-28 NOTE — PROGRESS NOTE ADULT - PROBLEM SELECTOR PLAN 2
Neutropenic, afebrile   continue cefepime , diflucan, Acyclovir for now  throat culture (-).  MRSA (-), staph aureus PCR +.  CT CAP  to look for source of infection- No CT evidence for source of infection in the chest, abdomen or pelvis.  12/24- Staph aureus PCR detected, will start Vanco 1 gm Q 12 hrs.  12/26- Vanco discontinued on 12/26 as patient continue to be afebrile and CT maxillo facial did not reveal any abscess.

## 2021-12-28 NOTE — CHART NOTE - NSCHARTNOTEFT_GEN_A_CORE
MEDICINE PA EPISODIC NOTE    Notified by RN of pt. c/o dental pain. Pt. was seen and examined at bedside, sleeping, in NAD. VSS. Will continue current pain medication regimen and f/u in AM w/ dental recommendations for maxillary mucosal thickening. Endorsed to day team in AM. Will continue to closely monitor.     Sandra Tavares PA-C  Dept. of Medicine  Spectra [phone] MEDICINE PA EPISODIC NOTE    Notified by RN of pt. c/o gum pain. Pt. was seen and examined at bedside, sleeping, in NAD. VSS. Will continue current pain medication regimen and f/u in AM w/ dental recommendations for maxillary mucosal thickening. Endorsed to day team in AM. Will continue to closely monitor.     Sandra Tavares PA-C  Dept. of Medicine  Spectra [phone]

## 2021-12-28 NOTE — PROGRESS NOTE ADULT - ASSESSMENT
36M with Aplastic Anemia diagnosed April 2020, s/p equine ATG, Cyclosporine, and Promacta. Bone marrow biopsy was done on 5/2021 revealed normal morphology and cellularity, eventually Promacta was stopped and Cyclosporin in 5/2021, in September 2021 noted his platelets dropping  now admitted with neutropenic fever found to have pancytopenia secondary to disease condition.  Bone marrow biopsy was repeated on 12/2021 showed decreased megakaryocytes with tentative plan to start ATG this week.   36M with Aplastic Anemia diagnosed April 2020, s/p equine ATG, Cyclosporine, and Promacta. Bone marrow biopsy was done on 5/2021 revealed normal morphology and cellularity, eventually Promacta was stopped and Cyclosporin in 5/2021, in September 2021 noted his platelets dropping  now admitted with neutropenic fever found to have pancytopenia secondary to disease condition.  Bone marrow biopsy was repeated on 12/2021 showed decreased megakaryocytes; Rabbit ATG/ prednisone started on 12/28/21. Pt has pancytopenia secondary to chemotherapy and or disease   36M with Aplastic Anemia diagnosed April 2020, s/p equine ATG, Cyclosporine, and Promacta. Bone marrow biopsy was done on 5/2021 revealed normal morphology and cellularity, eventually Promacta was stopped and Cyclosporin in 5/2021, in September 2021 noted his platelets dropping  now admitted with neutropenic fever found to have pancytopenia secondary to disease condition.  Bone marrow biopsy was repeated on 12/2021 showed decreased megakaryocytes; Rabbit ATG/ prednisone will be started on 12/29/21. Pt has pancytopenia secondary to chemotherapy and or disease

## 2021-12-28 NOTE — PROGRESS NOTE ADULT - PROBLEM SELECTOR PLAN 4
12/24-  CT maxillofacial with contrast and CT neck with contrast to r/o abscess. (-)  12/26- pain control., First mouth wash, local Lidocaine adm with Q tips. 12/24-  CT maxillofacial with contrast and CT neck with contrast to r/o abscess. (-)  12/26- pain control., First mouth wash, local Lidocaine adm with Q tips.  Dental following

## 2021-12-28 NOTE — PROGRESS NOTE ADULT - PROBLEM SELECTOR PLAN 1
Relapsed Aplastic Anemia   S/p ATG, CSA, and promacta with complete remission  BM bx on 11/9/20 revealed hypocellular marrow.  BMbx 12/2021showing erythroid predominance, myeloid and erythroid maturation, and markedly decreased megakaryocytes   Pt states he has not been able to receive Promacta outpatient x~10 days. Sent script to [facility] at [facility] on 12/22 12/23  transferred  to [facility] for ATG treatment, plan on 12/24.  Monitor CBC with diff/lytes, transfuse and or replete PRN  Monitor weight, I and O, mouth care  12/26- Start Cyclosporine 300 mg PO Q 12 hrs with f/u level on 12/29.  12/27 test dose today Relapsed Aplastic Anemia   S/p ATG, CSA, and promacta with complete remission  BM bx on 11/9/20 revealed hypocellular marrow.  BMbx 12/2021showing erythroid predominance, myeloid and erythroid maturation, and markedly decreased megakaryocytes   Pt states he has not been able to receive Promacta outpatient x~10 days. Sent script to [facility] at [facility] on 12/22 12/23  transferred  to [facility] for ATG treatment, plan on 12/24.  Monitor CBC with diff/lytes, transfuse and or replete PRN  Monitor weight, I and O, mouth care  12/26- Start Cyclosporine 300 mg PO Q 12 hrs with f/u level on 12/29.  12/27 test dose - no reaction noted  12/28 started Rabbit ATG/prednisone Relapsed Aplastic Anemia   S/p ATG, CSA, and promacta with complete remission  BM bx on 11/9/20 revealed hypocellular marrow.  BMbx 12/2021showing erythroid predominance, myeloid and erythroid maturation, and markedly decreased megakaryocytes   Pt states he has not been able to receive Promacta outpatient x~10 days. Sent script to [facility] at [facility] on 12/22 12/23  transferred  to [facility] for ATG treatment, plan on 12/24.  Monitor CBC with diff/lytes, transfuse and or replete PRN  Monitor weight, I and O, mouth care  12/26- Start Cyclosporine 300 mg PO Q 12 hrs with f/u level on 12/29.  12/27 test dose - no reaction noted  12/28 PICC placement  12/28 Thrombocytopenia platelet x 1unit    12/29 will start  Rabbit ATG/prednisone/promacta

## 2021-12-28 NOTE — PROGRESS NOTE ADULT - ASSESSMENT
36M with Aplastic Anemia diagnosed April 2020.  Underwent treatment with equine ATG, Cyclosporine, and Promacta.  Went into remission (BM bx 5/2021 with normal morphology and cellularity).  Promacta stopped May 2021.  Cyclosporin stopped August.  By September, platelets started to drop.  Now with pancytopenia and neutropenic fever.  Dental issue?  still complains of severe R posterior gum pain.  CT neck noted as well    Neutropenic fever  - continue cefepime  - would have dental come re-eval the patient  - monitor for further fevers  - if fever recurs, would change diflucan to voriconazole    I will be away, returning 1/3/2022.  My associates to cover.  Please call ID as needed [phone].

## 2021-12-28 NOTE — PROGRESS NOTE ADULT - SUBJECTIVE AND OBJECTIVE BOX
Please refer to previous dental consult note for additional information.       MEDICATIONS  (STANDING):  acetaminophen     Tablet .. 650 milliGRAM(s) Oral daily  acyclovir   Oral Tab/Cap 400 milliGRAM(s) Oral every 8 hours  Biotene Dry Mouth Oral Rinse 15 milliLiter(s) Swish and Spit four times a day  cefepime   IVPB 2000 milliGRAM(s) IV Intermittent every 8 hours  chlorhexidine 0.12% Liquid 15 milliLiter(s) Swish and Spit two times a day  chlorhexidine 2% Cloths 1 Application(s) Topical <User Schedule>  cycloSPORINE  (SandIMMUNE) 300 milliGRAM(s) Oral every 12 hours  diphenhydrAMINE Injectable 50 milliGRAM(s) IV Push daily  eltrombopag 150 milliGRAM(s) Oral daily  famotidine    Tablet 20 milliGRAM(s) Oral two times a day  FIRST- Mouthwash  BLM 10 milliLiter(s) Swish and Spit four times a day  fluconAZOLE   Tablet 200 milliGRAM(s) Oral daily  lidocaine 2% Viscous 2 milliLiter(s) Oral four times a day  methylPREDNISolone sodium succinate IVPB 500 milliGRAM(s) IV Intermittent once  pantoprazole    Tablet 40 milliGRAM(s) Oral before breakfast  sodium chloride 0.65% Nasal 1 Spray(s) Both Nostrils four times a day  sodium chloride 0.9%. 1000 milliLiter(s) (50 mL/Hr) IV Continuous <Continuous>    MEDICATIONS  (PRN):  acetaminophen     Tablet .. 650 milliGRAM(s) Oral every 6 hours PRN Temp greater or equal to 38C (100.4F), Mild Pain (1 - 3)  aluminum hydroxide/magnesium hydroxide/simethicone Suspension 30 milliLiter(s) Oral every 4 hours PRN Dyspepsia  calcium carbonate    500 mG (Tums) Chewable 1 Tablet(s) Chew every 4 hours PRN Heartburn  diphenhydrAMINE 25 milliGRAM(s) Oral every 12 hours PRN Rash and/or Itching  diphenhydrAMINE Injectable 25 milliGRAM(s) IV Push once PRN Rash  EPINEPHrine     1 mG/mL Injectable 0.3 milliGRAM(s) IntraMuscular once PRN anaphylactic reaction  hydrocortisone sodium succinate Injectable 50 milliGRAM(s) IV Push every 12 hours PRN pre transfusion      Allergies    No Known Allergies    Intolerances        Vital Signs Last 24 Hrs  T(C): 36.9 (28 Dec 2021 17:06), Max: 37 (28 Dec 2021 12:45)  T(F): 98.4 (28 Dec 2021 17:06), Max: 98.6 (28 Dec 2021 12:45)  HR: 84 (28 Dec 2021 17:06) (84 - 106)  BP: 143/86 (28 Dec 2021 17:06) (120/73 - 156/80)  BP(mean): --  RR: 18 (28 Dec 2021 17:06) (16 - 18)  SpO2: 100% (28 Dec 2021 17:06) (97% - 100%)    LABS:                        x      x     )-----------( 37       ( 28 Dec 2021 14:56 )             x        12-28    138  |  104  |  25<H>  ----------------------------<  117<H>  4.2   |  21<L>  |  0.96    Ca    9.3      28 Dec 2021 09:47  Phos  3.1     12-28  Mg     2.0     12-28    TPro  7.6  /  Alb  4.5  /  TBili  0.5  /  DBili  x   /  AST  15  /  ALT  35  /  AlkPhos  77  12-28    Platelet Count - Automated: 37 K/uL *L* [150 - 400] (12-28 @ 14:56)  INR: 1.14 ratio [0.88 - 1.16] (12-28 @ 09:47)  WBC Count: 1.62 K/uL *L* [3.80 - 10.50] (12-28 @ 09:47)  Platelet Count - Automated: 8 K/uL *LL* [150 - 400] (12-28 @ 09:47)        CLINICAL EXAM:   Extra oral exam: No swelling, LAD, Asymmetry. TMJ FROM, free from pain, clicking and popping.   Intra oral exam: Full dentate. Multiple intact restoration. No intra oral swelling or notable lesions.  Inflammation of impacted soft tissue around #32: tender to palpation     ASSESSMENT: Pericoronitis and mechanical trauma of soft tissue around #32     PROCEDURE:  Irrigated soft tissue with peridex and monojet syringe.      RECOMMENDATIONS:  1) Irrigation of soft tissue around #32  2 x per day with peridex. Pain management per med team.   2) F/U with outpatient dentist for comprehensive dental care upon discharge.      René Parham [facility], #[phone]

## 2021-12-28 NOTE — PROGRESS NOTE ADULT - ATTENDING COMMENTS
36M with Aplastic Anemia diagnosed April 2020.  Underwent treatment with equine ATG, Cyclosporine, and Promacta.  Went into remission (BM bx 5/2021 with normal morphology and cellularity).  Promacta stopped May 2021.  Cyclosporin stopped August 2021.  By September, platelets started to drop.  Now with pancytopenia and neutropenic fever.  No obvious focus of infection, however, c/o sorethroat.  On cyclosporine 300 mg BID (started on 12/26/21), first level on 12/29/21 -- PENDING  Today is day 2    - Febrile on 12/23/21, on cefepime and fluconazole, blood cultures negative, continue cefepime for now  - Nasal MRSA + for staph aureus (not detected though for MRSA). Will start vancomycin.   - Dental consult with x-rays, no dental source of patient's R sided gum pain. CT scan showed some sinus opacification but asymptomatic from that standpoint.  - Discussed with patient today about the possibility of going straight to allogeneic transplant, he has 30 year old sister. Have discussed transplant with patient extensively in the past.   - Afebrile now for over 24 hours. Discontinued vancomycin.   - Will restart cyclosporine as 300 mg BID and check level Wednesday morning.   - Possible rabbit ATG, will revisit daily but prefer transplant. Patient reported he plans to speak to his sister today. Will continue to follow up daily about this. 36M with Aplastic Anemia diagnosed April 2020.  Underwent treatment with equine ATG, Cyclosporine, and Promacta.  Went into remission (BM bx 5/2021 with normal morphology and cellularity).  Promacta stopped May 2021.  Cyclosporin stopped August 2021.  By September, platelets started to drop.  Now with pancytopenia and neutropenic fever.  No obvious focus of infection, however, c/o sorethroat.  On cyclosporine 300 mg BID (started on 12/26/21), first level on 12/29/21 -- PENDING  Today is day 3    - Febrile on 12/23/21, on cefepime and fluconazole, blood cultures negative, continue cefepime for now  - Nasal MRSA + for staph aureus (not detected though for MRSA). Will start vancomycin.   - Dental consult with x-rays, no dental source of patient's R sided gum pain. CT scan showed some sinus opacification but asymptomatic from that standpoint.  - Discussed with patient today about the possibility of going straight to allogeneic transplant, he has 30 year old sister. Have discussed transplant with patient extensively in the past.   - Afebrile now for over 24 hours. Discontinued vancomycin.   - Will restart cyclosporine as 300 mg BID and check level Wednesday morning.   - Pt decided for rabbit ATG, s/p test dose 12/27 tolerated well. To start ATG today daily x5d. Will also add Promacta.  - prednisone for serum sickness PPX

## 2021-12-28 NOTE — PROGRESS NOTE ADULT - SUBJECTIVE AND OBJECTIVE BOX
Diagnosis: Relapsed aplastic anemia    Protocol/Chemo Regimen:  Rabbit ATG/prednisone     Day: 1     Pt endorsed: right lower gum discomfort/pain- stable    Review of Systems: Denies any chest pain ,palpitation, SOB, nausea, vomiting or diarrhea.    Pain scale:    2-3/10         Location: right lower gum     Diet: regular    Allergies: No Known Allergies    ANTIMICROBIALS  acyclovir   Oral Tab/Cap 400 milliGRAM(s) Oral every 8 hours  cefepime   IVPB 2000 milliGRAM(s) IV Intermittent every 8 hours  fluconAZOLE   Tablet 200 milliGRAM(s) Oral daily      STANDING MEDICATIONS  Biotene Dry Mouth Oral Rinse 15 milliLiter(s) Swish and Spit four times a day  famotidine    Tablet 20 milliGRAM(s) Oral two times a day  FIRST- Mouthwash  BLM 10 milliLiter(s) Swish and Spit four times a day  sodium chloride 0.65% Nasal 1 Spray(s) Both Nostrils four times a day    PRN MEDICATIONS  acetaminophen     Tablet .. 650 milliGRAM(s) Oral every 6 hours PRN  Vital Signs Last 24 Hrs  T(C): 36.8 (27 Dec 2021 04:55), Max: 36.9 (26 Dec 2021 13:10)  T(F): 98.2 (27 Dec 2021 04:55), Max: 98.4 (26 Dec 2021 13:10)  HR: 88 (27 Dec 2021 04:55) (82 - 91)  BP: 121/74 (27 Dec 2021 04:55) (116/71 - 138/77)  BP(mean): --  RR: 18 (27 Dec 2021 04:55) (16 - 18)  SpO2: 98% (27 Dec 2021 04:55) (97% - 99%)    PHYSICAL EXAM  General: adult in NAD  HEENT: clear oropharynx, anicteric sclera, tenderness + in right submandibular area.  CV: normal S1/S2 RRR  Lungs: positive air movement b/l ant lungs,clear to auscultation, no wheezes, no rales  Abdomen: soft, + BS,  non-tender non-distended  Ext: no edema in BLE   Skin: no rash  Neuro: alert and oriented X 3  Central Line: PIV CDI        LABS:                          7.5    1.98  )-----------( 14       ( 27 Dec 2021 07:15 )             21.1         Mean Cell Volume : 84.1 fl  Mean Cell Hemoglobin : 29.9 pg  Mean Cell Hemoglobin Concentration : 35.5 gm/dL  Auto Neutrophil # : 0.09 K/uL  Auto Lymphocyte # : 1.80 K/uL  Auto Monocyte # : 0.05 K/uL  Auto Eosinophil # : 0.02 K/uL  Auto Basophil # : 0.00 K/uL  Auto Neutrophil % : 4.4 %  Auto Lymphocyte % : 91.1 %  Auto Monocyte % : 2.7 %  Auto Eosinophil % : 0.9 %  Auto Basophil % : 0.0 %      12-27    137  |  104  |  21  ----------------------------<  114<H>  4.1   |  23  |  1.02    Ca    9.3      27 Dec 2021 07:15  Phos  4.1     12-27  Mg     2.1     12-27    TPro  7.5  /  Alb  4.4  /  TBili  0.4  /  DBili  x   /  AST  21  /  ALT  41  /  AlkPhos  79  12-27      PT/INR - ( 27 Dec 2021 07:15 )   PT: 12.8 sec;   INR: 1.07 ratio         PTT - ( 27 Dec 2021 07:15 )  PTT:31.0 sec      Uric Acid 4.8    RECENT CULTURES:  12-23 @ 17:32  Throat Throat  No Streptococcus pyogenes (Group A) isolated    12-23 @ 14:58  .Throat Throat  No Streptococcus pyogenes (Group A) isolated    12-23 @ 09:16  .Blood Blood-Peripheral  No growth to date.    12-22 @ 01:14  Clean Catch Clean Catch (Midstream)  No growth    12-21 @ 17:15  .Blood Blood  No growth to date.      RADIOLOGY & ADDITIONAL STUDIES:  CT Neck Soft Tissue w/wo IV Cont (12.25.21 @ 12:00) >  Moderate mucosal thickening within the LEFT maxillary,   BILATERAL ethmoid and frontal sinuses. Minimal increased density seen   within the BILATERAL frontal sinuses which may reflect inspissated   secretions or possibly fungal material, less likely hemorrhage.   Minimal   reactive lymph nodes noted..         Diagnosis: Relapsed aplastic anemia    Protocol/Chemo Regimen:  Rabbit ATG/prednisone     Day: 1     Pt endorsed: right lower gum discomfort/pain- stable    Review of Systems: Denies any chest pain ,palpitation, SOB, nausea, vomiting or diarrhea.    Pain scale:    2-3/10         Location: right lower gum     Diet: regular    Allergies: No Known Allergies    ANTIMICROBIALS  acyclovir   Oral Tab/Cap 400 milliGRAM(s) Oral every 8 hours  cefepime   IVPB 2000 milliGRAM(s) IV Intermittent every 8 hours  fluconAZOLE   Tablet 200 milliGRAM(s) Oral daily      STANDING MEDICATIONS  Biotene Dry Mouth Oral Rinse 15 milliLiter(s) Swish and Spit four times a day  famotidine    Tablet 20 milliGRAM(s) Oral two times a day  FIRST- Mouthwash  BLM 10 milliLiter(s) Swish and Spit four times a day  sodium chloride 0.65% Nasal 1 Spray(s) Both Nostrils four times a day    PRN MEDICATIONS  acetaminophen     Tablet .. 650 milliGRAM(s) Oral every 6 hours PRN  Vital Signs Last 24 Hrs  T(C): 36.9 (28 Dec 2021 04:33), Max: 36.9 (28 Dec 2021 04:33)  T(F): 98.5 (28 Dec 2021 04:33), Max: 98.5 (28 Dec 2021 04:33)  HR: 102 (28 Dec 2021 04:33) (100 - 108)  BP: 145/78 (28 Dec 2021 04:33) (120/73 - 156/80)  BP(mean): --  RR: 18 (28 Dec 2021 04:33) (16 - 18)  SpO2: 97% (28 Dec 2021 04:33) (96% - 100%)  PHYSICAL EXAM  General: adult in NAD  HEENT: clear oropharynx, anicteric sclera, tenderness + in right submandibular area.  CV: normal S1/S2 RRR  Lungs: positive air movement b/l ant lungs,clear to auscultation, no wheezes, no rales  Abdomen: soft, + BS,  non-tender non-distended  Ext: no edema in BLE   Skin: no rash  Neuro: alert and oriented X 3  Central Line: PIV CDI    LABS:                          7.5    1.98  )-----------( 14       ( 27 Dec 2021 07:15 )             21.1         Mean Cell Volume : 84.1 fl  Mean Cell Hemoglobin : 29.9 pg  Mean Cell Hemoglobin Concentration : 35.5 gm/dL  Auto Neutrophil # : 0.09 K/uL  Auto Lymphocyte # : 1.80 K/uL  Auto Monocyte # : 0.05 K/uL  Auto Eosinophil # : 0.02 K/uL  Auto Basophil # : 0.00 K/uL  Auto Neutrophil % : 4.4 %  Auto Lymphocyte % : 91.1 %  Auto Monocyte % : 2.7 %  Auto Eosinophil % : 0.9 %  Auto Basophil % : 0.0 %      12-27    137  |  104  |  21  ----------------------------<  114<H>  4.1   |  23  |  1.02    Ca    9.3      27 Dec 2021 07:15  Phos  4.1     12-27  Mg     2.1     12-27    TPro  7.5  /  Alb  4.4  /  TBili  0.4  /  DBili  x   /  AST  21  /  ALT  41  /  AlkPhos  79  12-27          PT/INR - ( 27 Dec 2021 07:15 )   PT: 12.8 sec;   INR: 1.07 ratio         PTT - ( 27 Dec 2021 07:15 )  PTT:31.0 sec                          RECENT CULTURES:  12-23 @ 17:32  Throat Throat  No Streptococcus pyogenes (Group A) isolated    12-23 @ 14:58  .Throat Throat  No Streptococcus pyogenes (Group A) isolated    12-23 @ 09:16  .Blood Blood-Peripheral  No growth to date.    12-22 @ 01:14  Clean Catch Clean Catch (Midstream)  No growth    12-21 @ 17:15  .Blood Blood  No growth to date.      RADIOLOGY & ADDITIONAL STUDIES:  CT Neck Soft Tissue w/wo IV Cont (12.25.21 @ 12:00) >  Moderate mucosal thickening within the LEFT maxillary,   BILATERAL ethmoid and frontal sinuses. Minimal increased density seen   within the BILATERAL frontal sinuses which may reflect inspissated   secretions or possibly fungal material, less likely hemorrhage.   Minimal   reactive lymph nodes noted..         Diagnosis: Relapsed aplastic anemia    Protocol/Chemo Regimen:  Rabbit ATG/prednisone/promacta will start tomorrow      Day: n/a      Pt endorsed: right lower gum discomfort/pain- stable    Review of Systems: Denies any chest pain ,palpitation, SOB, nausea, vomiting or diarrhea.    Pain scale:    2-3/10         Location: right lower gum     Diet: regular    Allergies: No Known Allergies    ANTIMICROBIALS  acyclovir   Oral Tab/Cap 400 milliGRAM(s) Oral every 8 hours  cefepime   IVPB 2000 milliGRAM(s) IV Intermittent every 8 hours  fluconAZOLE   Tablet 200 milliGRAM(s) Oral daily      STANDING MEDICATIONS  Biotene Dry Mouth Oral Rinse 15 milliLiter(s) Swish and Spit four times a day  famotidine    Tablet 20 milliGRAM(s) Oral two times a day  FIRST- Mouthwash  BLM 10 milliLiter(s) Swish and Spit four times a day  sodium chloride 0.65% Nasal 1 Spray(s) Both Nostrils four times a day    PRN MEDICATIONS  acetaminophen     Tablet .. 650 milliGRAM(s) Oral every 6 hours PRN  Vital Signs Last 24 Hrs  T(C): 36.9 (28 Dec 2021 04:33), Max: 36.9 (28 Dec 2021 04:33)  T(F): 98.5 (28 Dec 2021 04:33), Max: 98.5 (28 Dec 2021 04:33)  HR: 102 (28 Dec 2021 04:33) (100 - 108)  BP: 145/78 (28 Dec 2021 04:33) (120/73 - 156/80)  BP(mean): --  RR: 18 (28 Dec 2021 04:33) (16 - 18)  SpO2: 97% (28 Dec 2021 04:33) (96% - 100%)  PHYSICAL EXAM  General: adult in NAD  HEENT: clear oropharynx, anicteric sclera, tenderness + in right submandibular area.  CV: normal S1/S2 RRR  Lungs: positive air movement b/l ant lungs,clear to auscultation, no wheezes, no rales  Abdomen: soft, + BS,  non-tender non-distended  Ext: no edema in BLE   Skin: no rash  Neuro: alert and oriented X 3  Central Line: PIV CDI      LABS:                          x      x     )-----------( 37       ( 28 Dec 2021 14:56 )             x            Mean Cell Volume : 82.2 fl  Mean Cell Hemoglobin : 29.8 pg  Mean Cell Hemoglobin Concentration : 36.3 gm/dL  Auto Neutrophil # : 0.10 K/uL  Auto Lymphocyte # : 1.47 K/uL  Auto Monocyte # : 0.03 K/uL  Auto Eosinophil # : 0.00 K/uL  Auto Basophil # : 0.01 K/uL  Auto Neutrophil % : 6.4 %  Auto Lymphocyte % : 90.9 %  Auto Monocyte % : 1.8 %  Auto Eosinophil % : 0.0 %  Auto Basophil % : 0.9 %      12-28    138  |  104  |  25<H>  ----------------------------<  117<H>  4.2   |  21<L>  |  0.96    Ca    9.3      28 Dec 2021 09:47  Phos  3.1     12-28  Mg     2.0     12-28    TPro  7.6  /  Alb  4.5  /  TBili  0.5  /  DBili  x   /  AST  15  /  ALT  35  /  AlkPhos  77  12-28          PT/INR - ( 28 Dec 2021 09:47 )   PT: 13.6 sec;   INR: 1.14 ratio         PTT - ( 28 Dec 2021 09:47 )  PTT:30.4 sec      Uric Acid 5.0        RECENT CULTURES:  12-23 @ 17:32  Throat Throat  No Streptococcus pyogenes (Group A) isolated    12-23 @ 14:58  .Throat Throat  No Streptococcus pyogenes (Group A) isolated    12-23 @ 09:16  .Blood Blood-Peripheral  No growth to date.    12-22 @ 01:14  Clean Catch Clean Catch (Midstream)  No growth    12-21 @ 17:15  .Blood Blood  No growth to date.      RADIOLOGY & ADDITIONAL STUDIES:  CT Neck Soft Tissue w/wo IV Cont (12.25.21 @ 12:00) >  Moderate mucosal thickening within the LEFT maxillary,   BILATERAL ethmoid and frontal sinuses. Minimal increased density seen   within the BILATERAL frontal sinuses which may reflect inspissated   secretions or possibly fungal material, less likely hemorrhage.   Minimal   reactive lymph nodes noted..

## 2021-12-29 LAB
ALBUMIN SERPL ELPH-MCNC: 4.1 G/DL — SIGNIFICANT CHANGE UP (ref 3.3–5)
ALP SERPL-CCNC: 69 U/L — SIGNIFICANT CHANGE UP (ref 40–120)
ALT FLD-CCNC: 31 U/L — SIGNIFICANT CHANGE UP (ref 10–45)
ANION GAP SERPL CALC-SCNC: 12 MMOL/L — SIGNIFICANT CHANGE UP (ref 5–17)
APPEARANCE UR: CLEAR — SIGNIFICANT CHANGE UP
APTT BLD: 28.6 SEC — SIGNIFICANT CHANGE UP (ref 27.5–35.5)
AST SERPL-CCNC: 13 U/L — SIGNIFICANT CHANGE UP (ref 10–40)
BASOPHILS # BLD AUTO: 0 K/UL — SIGNIFICANT CHANGE UP (ref 0–0.2)
BASOPHILS NFR BLD AUTO: 0 % — SIGNIFICANT CHANGE UP (ref 0–2)
BILIRUB SERPL-MCNC: 0.4 MG/DL — SIGNIFICANT CHANGE UP (ref 0.2–1.2)
BILIRUB UR-MCNC: NEGATIVE — SIGNIFICANT CHANGE UP
BLD GP AB SCN SERPL QL: NEGATIVE — SIGNIFICANT CHANGE UP
BUN SERPL-MCNC: 23 MG/DL — SIGNIFICANT CHANGE UP (ref 7–23)
CALCIUM SERPL-MCNC: 8.9 MG/DL — SIGNIFICANT CHANGE UP (ref 8.4–10.5)
CHLORIDE SERPL-SCNC: 104 MMOL/L — SIGNIFICANT CHANGE UP (ref 96–108)
CO2 SERPL-SCNC: 21 MMOL/L — LOW (ref 22–31)
COLOR SPEC: SIGNIFICANT CHANGE UP
CREAT SERPL-MCNC: 0.93 MG/DL — SIGNIFICANT CHANGE UP (ref 0.5–1.3)
CYCLOSPORINE SER-MCNC: 197 NG/ML — SIGNIFICANT CHANGE UP (ref 150–400)
DIFF PNL FLD: NEGATIVE — SIGNIFICANT CHANGE UP
EOSINOPHIL # BLD AUTO: 0.02 K/UL — SIGNIFICANT CHANGE UP (ref 0–0.5)
EOSINOPHIL NFR BLD AUTO: 0.9 % — SIGNIFICANT CHANGE UP (ref 0–6)
FIBRINOGEN PPP-MCNC: 665 MG/DL — HIGH (ref 290–520)
GLUCOSE SERPL-MCNC: 95 MG/DL — SIGNIFICANT CHANGE UP (ref 70–99)
GLUCOSE UR QL: NEGATIVE — SIGNIFICANT CHANGE UP
HCT VFR BLD CALC: 18.1 % — CRITICAL LOW (ref 39–50)
HGB BLD-MCNC: 6.4 G/DL — CRITICAL LOW (ref 13–17)
INR BLD: 1.1 RATIO — SIGNIFICANT CHANGE UP (ref 0.88–1.16)
KETONES UR-MCNC: NEGATIVE — SIGNIFICANT CHANGE UP
LDH SERPL L TO P-CCNC: 148 U/L — SIGNIFICANT CHANGE UP (ref 50–242)
LEUKOCYTE ESTERASE UR-ACNC: NEGATIVE — SIGNIFICANT CHANGE UP
LYMPHOCYTES # BLD AUTO: 1.45 K/UL — SIGNIFICANT CHANGE UP (ref 1–3.3)
LYMPHOCYTES # BLD AUTO: 78.9 % — HIGH (ref 13–44)
MAGNESIUM SERPL-MCNC: 1.8 MG/DL — SIGNIFICANT CHANGE UP (ref 1.6–2.6)
MANUAL SMEAR VERIFICATION: SIGNIFICANT CHANGE UP
MCHC RBC-ENTMCNC: 29.8 PG — SIGNIFICANT CHANGE UP (ref 27–34)
MCHC RBC-ENTMCNC: 35.4 GM/DL — SIGNIFICANT CHANGE UP (ref 32–36)
MCV RBC AUTO: 84.2 FL — SIGNIFICANT CHANGE UP (ref 80–100)
MONOCYTES # BLD AUTO: 0.08 K/UL — SIGNIFICANT CHANGE UP (ref 0–0.9)
MONOCYTES NFR BLD AUTO: 4.6 % — SIGNIFICANT CHANGE UP (ref 2–14)
NEUTROPHILS # BLD AUTO: 0.29 K/UL — LOW (ref 1.8–7.4)
NEUTROPHILS NFR BLD AUTO: 15.6 % — LOW (ref 43–77)
NITRITE UR-MCNC: NEGATIVE — SIGNIFICANT CHANGE UP
PH UR: 5.5 — SIGNIFICANT CHANGE UP (ref 5–8)
PHOSPHATE SERPL-MCNC: 3.2 MG/DL — SIGNIFICANT CHANGE UP (ref 2.5–4.5)
PLAT MORPH BLD: NORMAL — SIGNIFICANT CHANGE UP
PLATELET # BLD AUTO: 30 K/UL — LOW (ref 150–400)
POTASSIUM SERPL-MCNC: 4.4 MMOL/L — SIGNIFICANT CHANGE UP (ref 3.5–5.3)
POTASSIUM SERPL-SCNC: 4.4 MMOL/L — SIGNIFICANT CHANGE UP (ref 3.5–5.3)
PROT SERPL-MCNC: 6.9 G/DL — SIGNIFICANT CHANGE UP (ref 6–8.3)
PROT UR-MCNC: NEGATIVE — SIGNIFICANT CHANGE UP
PROTHROM AB SERPL-ACNC: 13.1 SEC — SIGNIFICANT CHANGE UP (ref 10.6–13.6)
RBC # BLD: 2.15 M/UL — LOW (ref 4.2–5.8)
RBC # FLD: 12.1 % — SIGNIFICANT CHANGE UP (ref 10.3–14.5)
RBC BLD AUTO: SIGNIFICANT CHANGE UP
RH IG SCN BLD-IMP: POSITIVE — SIGNIFICANT CHANGE UP
SMUDGE CELLS # BLD: PRESENT — SIGNIFICANT CHANGE UP
SODIUM SERPL-SCNC: 137 MMOL/L — SIGNIFICANT CHANGE UP (ref 135–145)
SP GR SPEC: 1.02 — SIGNIFICANT CHANGE UP (ref 1.01–1.02)
URATE SERPL-MCNC: 5.1 MG/DL — SIGNIFICANT CHANGE UP (ref 3.4–8.8)
UROBILINOGEN FLD QL: NEGATIVE — SIGNIFICANT CHANGE UP
WBC # BLD: 1.84 K/UL — LOW (ref 3.8–10.5)
WBC # FLD AUTO: 1.84 K/UL — LOW (ref 3.8–10.5)

## 2021-12-29 PROCEDURE — 99232 SBSQ HOSP IP/OBS MODERATE 35: CPT

## 2021-12-29 RX ORDER — CYCLOSPORINE 100 MG/1
3.25 CAPSULE ORAL
Qty: 195 | Refills: 1
Start: 2021-12-29 | End: 2022-02-26

## 2021-12-29 RX ORDER — ELTROMBOPAG OLAMINE 50 MG/1
6 TABLET, FILM COATED ORAL
Qty: 0 | Refills: 0 | DISCHARGE
Start: 2021-12-29

## 2021-12-29 RX ORDER — ACETAMINOPHEN 500 MG
1000 TABLET ORAL ONCE
Refills: 0 | Status: COMPLETED | OUTPATIENT
Start: 2021-12-29 | End: 2021-12-29

## 2021-12-29 RX ORDER — HYDROCORTISONE 20 MG
100 TABLET ORAL ONCE
Refills: 0 | Status: COMPLETED | OUTPATIENT
Start: 2021-12-29 | End: 2021-12-29

## 2021-12-29 RX ORDER — FAMOTIDINE 10 MG/ML
20 INJECTION INTRAVENOUS ONCE
Refills: 0 | Status: COMPLETED | OUTPATIENT
Start: 2021-12-29 | End: 2021-12-29

## 2021-12-29 RX ORDER — FAMOTIDINE 10 MG/ML
20 INJECTION INTRAVENOUS EVERY 12 HOURS
Refills: 0 | Status: DISCONTINUED | OUTPATIENT
Start: 2021-12-30 | End: 2022-01-04

## 2021-12-29 RX ORDER — DIPHENHYDRAMINE HCL 50 MG
50 CAPSULE ORAL ONCE
Refills: 0 | Status: COMPLETED | OUTPATIENT
Start: 2021-12-29 | End: 2021-12-29

## 2021-12-29 RX ORDER — CYCLOSPORINE 100 MG/1
325 CAPSULE ORAL EVERY 12 HOURS
Refills: 0 | Status: DISCONTINUED | OUTPATIENT
Start: 2021-12-29 | End: 2022-01-07

## 2021-12-29 RX ADMIN — FAMOTIDINE 20 MILLIGRAM(S): 10 INJECTION INTRAVENOUS at 17:13

## 2021-12-29 RX ADMIN — ELTROMBOPAG OLAMINE 150 MILLIGRAM(S): 50 TABLET, FILM COATED ORAL at 11:59

## 2021-12-29 RX ADMIN — CEFEPIME 100 MILLIGRAM(S): 1 INJECTION, POWDER, FOR SOLUTION INTRAMUSCULAR; INTRAVENOUS at 21:27

## 2021-12-29 RX ADMIN — CEFEPIME 100 MILLIGRAM(S): 1 INJECTION, POWDER, FOR SOLUTION INTRAMUSCULAR; INTRAVENOUS at 14:09

## 2021-12-29 RX ADMIN — Medication 400 MILLIGRAM(S): at 06:30

## 2021-12-29 RX ADMIN — Medication 400 MILLIGRAM(S): at 14:09

## 2021-12-29 RX ADMIN — CEFEPIME 100 MILLIGRAM(S): 1 INJECTION, POWDER, FOR SOLUTION INTRAMUSCULAR; INTRAVENOUS at 06:31

## 2021-12-29 RX ADMIN — Medication 650 MILLIGRAM(S): at 15:05

## 2021-12-29 RX ADMIN — Medication 50 MILLIGRAM(S): at 15:04

## 2021-12-29 RX ADMIN — Medication 100 MILLIGRAM(S): at 06:27

## 2021-12-29 RX ADMIN — CHLORHEXIDINE GLUCONATE 1 APPLICATION(S): 213 SOLUTION TOPICAL at 07:36

## 2021-12-29 RX ADMIN — Medication 400 MILLIGRAM(S): at 21:27

## 2021-12-29 RX ADMIN — CHLORHEXIDINE GLUCONATE 15 MILLILITER(S): 213 SOLUTION TOPICAL at 06:29

## 2021-12-29 RX ADMIN — CHLORHEXIDINE GLUCONATE 15 MILLILITER(S): 213 SOLUTION TOPICAL at 17:13

## 2021-12-29 RX ADMIN — FLUCONAZOLE 200 MILLIGRAM(S): 150 TABLET ORAL at 11:59

## 2021-12-29 RX ADMIN — FAMOTIDINE 20 MILLIGRAM(S): 10 INJECTION INTRAVENOUS at 06:27

## 2021-12-29 RX ADMIN — Medication 25 MILLIGRAM(S): at 08:53

## 2021-12-29 RX ADMIN — PANTOPRAZOLE SODIUM 40 MILLIGRAM(S): 20 TABLET, DELAYED RELEASE ORAL at 06:31

## 2021-12-29 RX ADMIN — Medication 15 MILLILITER(S): at 11:58

## 2021-12-29 RX ADMIN — Medication 15 MILLILITER(S): at 06:27

## 2021-12-29 RX ADMIN — CYCLOSPORINE 325 MILLIGRAM(S): 100 CAPSULE ORAL at 17:21

## 2021-12-29 RX ADMIN — LIDOCAINE 2 MILLILITER(S): 4 CREAM TOPICAL at 06:32

## 2021-12-29 RX ADMIN — Medication 650 MILLIGRAM(S): at 08:54

## 2021-12-29 RX ADMIN — Medication 15 MILLILITER(S): at 17:13

## 2021-12-29 RX ADMIN — CYCLOSPORINE 300 MILLIGRAM(S): 100 CAPSULE ORAL at 06:30

## 2021-12-29 RX ADMIN — Medication 50 MILLIGRAM(S): at 08:54

## 2021-12-29 RX ADMIN — Medication 100 MILLIGRAM(S): at 17:55

## 2021-12-29 RX ADMIN — Medication 1000 MILLIGRAM(S): at 18:59

## 2021-12-29 RX ADMIN — FAMOTIDINE 20 MILLIGRAM(S): 10 INJECTION INTRAVENOUS at 17:55

## 2021-12-29 RX ADMIN — Medication 400 MILLIGRAM(S): at 18:35

## 2021-12-29 RX ADMIN — Medication 50 MILLIGRAM(S): at 18:00

## 2021-12-29 NOTE — PROGRESS NOTE ADULT - ASSESSMENT
36M with Aplastic Anemia diagnosed April 2020.  Underwent treatment with equine ATG, Cyclosporine, and Promacta.  Went into remission (BM bx 5/2021 with normal morphology and cellularity).  Promacta stopped May 2021.  Cyclosporin stopped August.  By September, platelets started to drop.  Now with pancytopenia and neutropenic fever.  No obvious focus of infection, however, c/o sore throat.  RVP, blood cx, urine cx and throat cx negative  abd/plevis CT negative  neck soft tissue CT: some sinus changes  which could be secretions or fungal material but pt asymptomatic anyway    Neutropenic fever, negative infectious w/u (blood, urine throat cx)  Pericoronitis and mechanical trauma of soft tissue around #32, seen by dental  s/p PICC 12/28, started on ATG, cyclosporine, prednisone and promacta 12/29  pt still neutropenic, no fever  * c/w cefepime for now, started 12/21  * c/w acylovir and fluconazole ppx as per the protocol  * monitor CBC/diff and temp curve    The above assessment and plan was discussed with the primary team    Ritika Valiente MD  Pager [phone]  After 5pm and on weekends call [phone]

## 2021-12-29 NOTE — PROGRESS NOTE ADULT - ATTENDING COMMENTS
36M with Aplastic Anemia diagnosed April 2020.  Underwent treatment with equine ATG, Cyclosporine, and Promacta.  Went into remission (BM bx 5/2021 with normal morphology and cellularity).  Promacta stopped May 2021.  Cyclosporin stopped August 2021.  By September, platelets started to drop.  Now with pancytopenia and neutropenic fever.  No obvious focus of infection, however, c/o sorethroat.  On cyclosporine 300 mg BID (started on 12/26/21), first level on 12/29/21 -- PENDING  Today is day 3    - Febrile on 12/23/21, on cefepime and fluconazole, blood cultures negative, continue cefepime for now  - Nasal MRSA + for staph aureus (not detected though for MRSA). Will start vancomycin.   - Dental consult with x-rays, no dental source of patient's R sided gum pain. CT scan showed some sinus opacification but asymptomatic from that standpoint.  - Discussed with patient today about the possibility of going straight to allogeneic transplant, he has 30 year old sister. Have discussed transplant with patient extensively in the past.   - Afebrile now for over 24 hours. Discontinued vancomycin.   - Will restart cyclosporine as 300 mg BID and check level Wednesday morning.   - Pt decided for rabbit ATG, s/p test dose 12/27 tolerated well. To start ATG today daily x5d. Will also add Promacta.  - prednisone for serum sickness PPX 36M with Aplastic Anemia diagnosed April 2020.  Underwent treatment with equine ATG, Cyclosporine, and Promacta.  Went into remission (BM bx 5/2021 with normal morphology and cellularity).  Promacta stopped May 2021.  Cyclosporin stopped August 2021.  By September, platelets started to drop.  Now with pancytopenia and neutropenic fever.  No obvious focus of infection, however, c/o sorethroat.  On cyclosporine 300 mg BID (started on 12/26/21), first level on 12/29/21 -- PENDING  Today is day 4    - Febrile on 12/23/21, on cefepime and fluconazole, blood cultures negative, continue cefepime for now  - Nasal MRSA + for staph aureus (not detected though for MRSA). Will start vancomycin.   - Dental consult with x-rays, no dental source of patient's R sided gum pain. CT scan showed some sinus opacification but asymptomatic from that standpoint.  - Discussed with patient today about the possibility of going straight to allogeneic transplant, he has 30 year old sister. Have discussed transplant with patient extensively in the past.   - Afebrile now for over 24 hours. Discontinued vancomycin.   - 12/29 : Day 1 :cyclosporine as 300 mg BID (monitor level), rabit ATG, promacta  - Pt decided for rabbit ATG, s/p test dose 12/27 tolerated well. started 12/29 daily x5d.   - prednisone for serum sickness PPX

## 2021-12-29 NOTE — PROGRESS NOTE ADULT - PROBLEM SELECTOR PLAN 1
Relapsed Aplastic Anemia   S/p ATG, CSA, and promacta with complete remission  BM bx on 11/9/20 revealed hypocellular marrow.  BMbx 12/2021showing erythroid predominance, myeloid and erythroid maturation, and markedly decreased megakaryocytes   Pt states he has not been able to receive Promacta outpatient x~10 days. Sent script to [facility] at [facility] on 12/22 12/23  transferred  to [facility] for ATG treatment, plan on 12/24.  Monitor CBC with diff/lytes, transfuse and or replete PRN  Anemia PRBC x 1 unit   Monitor weight, I and O, mouth care  12/26- Start Cyclosporine 300 mg PO Q 12 hrs with f/u level on 12/29.  12/27 test dose - no reaction noted  12/28 PICC placement  12/29 Started  Rabbit ATG/prednisone/promacta Relapsed Aplastic Anemia   S/p ATG, CSA, and promacta with complete remission  BM bx on 11/9/20 revealed hypocellular marrow.  BMbx 12/2021showing erythroid predominance, myeloid and erythroid maturation, and markedly decreased megakaryocytes   Pt states he has not been able to receive Promacta outpatient x~10 days. Sent script to [facility] at [facility] on 12/22 12/23  transferred  to [facility] for ATG treatment, plan on 12/24.  Monitor CBC with diff/lytes, transfuse and or replete PRN  Anemia PRBC x 1 unit   Monitor weight, I and O, mouth care  12/26- Start Cyclosporine 300 mg PO Q 12 hrs   12/27 test dose - no reaction noted  12/28 PICC placement  12/29 Started  Rabbit ATG/prednisone/promacta  12/29 SCA level 197, dose increased to 325 PO BID, follow up CSA level on Saturday 1/1/22 Relapsed Aplastic Anemia   S/p ATG, CSA, and promacta with complete remission  BM bx on 11/9/20 revealed hypocellular marrow.  BMbx 12/2021showing erythroid predominance, myeloid and erythroid maturation, and markedly decreased megakaryocytes   Pt states he has not been able to receive Promacta outpatient x~10 days. Sent script to [facility] at [facility] on 12/22 12/23  transferred  to [facility] for ATG treatment, plan on 12/24.  Monitor CBC with diff/lytes, transfuse and or replete PRN  Anemia PRBC x 1 unit   Monitor weight, I and O, mouth care  12/26- Start Cyclosporine 300 mg PO Q 12 hrs   12/27 test dose - no reaction noted  12/28 PICC placement  12/29 Started  Rabbit ATG/prednisone/promacta  12/29 SCA level 197, dose increased to 325 PO BID, follow up CSA level on Saturday 1/1/22 12/29 Reaction to ATG- Benadryl, Hydrocortisone given

## 2021-12-29 NOTE — PROGRESS NOTE ADULT - PROBLEM SELECTOR PLAN 2
Neutropenic, afebrile   continue cefepime , diflucan, Acyclovir for now  throat culture (-).  MRSA (-), staph aureus PCR +.  CT CAP  to look for source of infection- No CT evidence for source of infection in the chest, abdomen or pelvis.  12/24- Staph aureus PCR detected, will start Vanco 1 gm Q 12 hrs.  12/26- Vanco discontinued on 12/26 as patient continue to be afebrile and CT maxillo facial did not reveal any abscess. Neutropenic, febrile   continue cefepime , diflucan, Acyclovir for now  throat culture (-).  MRSA (-), staph aureus PCR +.  CT CAP  to look for source of infection- No CT evidence for source of infection in the chest, abdomen or pelvis.  12/24- Staph aureus PCR detected, will start Vanco 1 gm Q 12 hrs.  12/26- Vanco discontinued on 12/26 as patient continue to be afebrile and CT maxillo facial did not reveal any abscess.

## 2021-12-29 NOTE — PROGRESS NOTE ADULT - ASSESSMENT
36M with Aplastic Anemia diagnosed April 2020, s/p equine ATG, Cyclosporine, and Promacta. Bone marrow biopsy was done on 5/2021 revealed normal morphology and cellularity, eventually Promacta was stopped and Cyclosporin in 5/2021, in September 2021 noted his platelets dropping  now admitted with neutropenic fever found to have pancytopenia secondary to disease condition.  Bone marrow biopsy was repeated on 12/2021 showed decreased megakaryocytes; Rabbit ATG/ prednisone  started on 12/29/21. Pt has pancytopenia secondary to chemotherapy and or disease

## 2021-12-29 NOTE — PROGRESS NOTE ADULT - SUBJECTIVE AND OBJECTIVE BOX
Diagnosis: Relapsed aplastic anemia    Protocol/Chemo Regimen:  Rabbit ATG/prednisone/promacta     Day: 1     Pt endorsed: right lower gum discomfort/pain- stable    Review of Systems: Denies any chest pain ,palpitation, SOB, nausea, vomiting or diarrhea.    Pain scale:    2-3/10         Location: right lower gum     Diet: regular    Allergies: No Known Allergies    ANTIMICROBIALS  acyclovir   Oral Tab/Cap 400 milliGRAM(s) Oral every 8 hours  cefepime   IVPB 2000 milliGRAM(s) IV Intermittent every 8 hours  fluconAZOLE   Tablet 200 milliGRAM(s) Oral daily      STANDING MEDICATIONS  Biotene Dry Mouth Oral Rinse 15 milliLiter(s) Swish and Spit four times a day  famotidine    Tablet 20 milliGRAM(s) Oral two times a day  FIRST- Mouthwash  BLM 10 milliLiter(s) Swish and Spit four times a day  sodium chloride 0.65% Nasal 1 Spray(s) Both Nostrils four times a day    PRN MEDICATIONS  acetaminophen     Tablet .. 650 milliGRAM(s) Oral every 6 hours PRN  Vital Signs Last 24 Hrs  T(C): 36.7 (29 Dec 2021 04:45), Max: 37.1 (28 Dec 2021 21:05)  T(F): 98.1 (29 Dec 2021 04:45), Max: 98.7 (28 Dec 2021 21:05)  HR: 83 (29 Dec 2021 04:45) (83 - 93)  BP: 137/85 (29 Dec 2021 04:45) (130/87 - 143/86)  BP(mean): --  RR: 16 (29 Dec 2021 04:45) (16 - 18)  SpO2: 98% (29 Dec 2021 04:45) (98% - 100%)  PHYSICAL EXAM  General: adult in NAD  HEENT: clear oropharynx, anicteric sclera, tenderness + in right submandibular area.  CV: normal S1/S2 RRR  Lungs: positive air movement b/l ant lungs,clear to auscultation, no wheezes, no rales  Abdomen: soft, + BS,  non-tender non-distended  Ext: no edema in BLE   Skin: no rash  Neuro: alert and oriented X 3  Central Line: PIV CDI    LABS:                          6.4    1.84  )-----------( 30       ( 29 Dec 2021 07:38 )             18.1         Mean Cell Volume : 84.2 fl  Mean Cell Hemoglobin : 29.8 pg  Mean Cell Hemoglobin Concentration : 35.4 gm/dL  Auto Neutrophil # : x  Auto Lymphocyte # : x  Auto Monocyte # : x  Auto Eosinophil # : x  Auto Basophil # : x  Auto Neutrophil % : x  Auto Lymphocyte % : x  Auto Monocyte % : x  Auto Eosinophil % : x  Auto Basophil % : x      12-29    137  |  104  |  23  ----------------------------<  95  4.4   |  21<L>  |  0.93    Ca    8.9      29 Dec 2021 07:38  Phos  3.2     12-29  Mg     1.8     12-29    TPro  6.9  /  Alb  4.1  /  TBili  0.4  /  DBili  x   /  AST  13  /  ALT  31  /  AlkPhos  69  12-29        PT/INR - ( 29 Dec 2021 07:38 )   PT: 13.1 sec;   INR: 1.10 ratio         PTT - ( 29 Dec 2021 07:38 )  PTT:28.6 sec      Uric Acid 5.1      RECENT CULTURES:  12-23 @ 17:32  Throat Throat  No Streptococcus pyogenes (Group A) isolated    12-23 @ 14:58  .Throat Throat  No Streptococcus pyogenes (Group A) isolated    12-23 @ 09:16  .Blood Blood-Peripheral  No growth to date.    12-22 @ 01:14  Clean Catch Clean Catch (Midstream)  No growth    12-21 @ 17:15  .Blood Blood  No growth to date.      RADIOLOGY & ADDITIONAL STUDIES:  CT Neck Soft Tissue w/wo IV Cont (12.25.21 @ 12:00) >  Moderate mucosal thickening within the LEFT maxillary,   BILATERAL ethmoid and frontal sinuses. Minimal increased density seen   within the BILATERAL frontal sinuses which may reflect inspissated   secretions or possibly fungal material, less likely hemorrhage.   Minimal   reactive lymph nodes noted..         Diagnosis: Relapsed aplastic anemia    Protocol/Chemo Regimen:  Rabbit ATG/prednisone/promacta     Day: 1     Pt endorsed: right lower gum discomfort/pain- stable    Review of Systems: Denies any chest pain ,palpitation, SOB, nausea, vomiting or diarrhea.    Pain scale:    2-3/10         Location: right lower gum     Diet: regular    Allergies: No Known Allergies    ANTIMICROBIALS  acyclovir   Oral Tab/Cap 400 milliGRAM(s) Oral every 8 hours  cefepime   IVPB 2000 milliGRAM(s) IV Intermittent every 8 hours  fluconAZOLE   Tablet 200 milliGRAM(s) Oral daily      STANDING MEDICATIONS  Biotene Dry Mouth Oral Rinse 15 milliLiter(s) Swish and Spit four times a day  famotidine    Tablet 20 milliGRAM(s) Oral two times a day  FIRST- Mouthwash  BLM 10 milliLiter(s) Swish and Spit four times a day  sodium chloride 0.65% Nasal 1 Spray(s) Both Nostrils four times a day    PRN MEDICATIONS  acetaminophen     Tablet .. 650 milliGRAM(s) Oral every 6 hours PRN  Vital Signs Last 24 Hrs  T(C): 36.7 (29 Dec 2021 04:45), Max: 37.1 (28 Dec 2021 21:05)  T(F): 98.1 (29 Dec 2021 04:45), Max: 98.7 (28 Dec 2021 21:05)  HR: 83 (29 Dec 2021 04:45) (83 - 93)  BP: 137/85 (29 Dec 2021 04:45) (130/87 - 143/86)  BP(mean): --  RR: 16 (29 Dec 2021 04:45) (16 - 18)  SpO2: 98% (29 Dec 2021 04:45) (98% - 100%)  PHYSICAL EXAM  General: adult in NAD  HEENT: clear oropharynx, anicteric sclera, tenderness + in right submandibular area.  CV: normal S1/S2 RRR  Lungs: positive air movement b/l ant lungs,clear to auscultation, no wheezes, no rales  Abdomen: soft, + BS,  non-tender non-distended  Ext: no edema in BLE   Skin: no rash  Neuro: alert and oriented X 3  Central Line: PIV CDI    LABS:                          6.4    1.84  )-----------( 30       ( 29 Dec 2021 07:38 )             18.1         Mean Cell Volume : 84.2 fl  Mean Cell Hemoglobin : 29.8 pg  Mean Cell Hemoglobin Concentration : 35.4 gm/dL  Auto Neutrophil # : x  Auto Lymphocyte # : x  Auto Monocyte # : x  Auto Eosinophil # : x  Auto Basophil # : x  Auto Neutrophil % : x  Auto Lymphocyte % : x  Auto Monocyte % : x  Auto Eosinophil % : x  Auto Basophil % : x      12-29    137  |  104  |  23  ----------------------------<  95  4.4   |  21<L>  |  0.93    Ca    8.9      29 Dec 2021 07:38  Phos  3.2     12-29  Mg     1.8     12-29    TPro  6.9  /  Alb  4.1  /  TBili  0.4  /  DBili  x   /  AST  13  /  ALT  31  /  AlkPhos  69  12-29        PT/INR - ( 29 Dec 2021 07:38 )   PT: 13.1 sec;   INR: 1.10 ratio         PTT - ( 29 Dec 2021 07:38 )  PTT:28.6 sec      Uric Acid 5.1      RECENT CULTURES:  12-23 @ 17:32  Throat Throat  No Streptococcus pyogenes (Group A) isolated    12-23 @ 14:58  .Throat Throat  No Streptococcus pyogenes (Group A) isolated    12-23 @ 09:16  .Blood Blood-Peripheral  No growth to date.    12-22 @ 01:14  Clean Catch Clean Catch (Midstream)  No growth    12-21 @ 17:15  .Blood Blood  No growth to date.      RADIOLOGY & ADDITIONAL STUDIES:    CT Neck Soft Tissue w/wo IV Cont (12.25.21 @ 12:00) >  Moderate mucosal thickening within the LEFT maxillary,   BILATERAL ethmoid and frontal sinuses. Minimal increased density seen   within the BILATERAL frontal sinuses which may reflect inspissated   secretions or possibly fungal material, less likely hemorrhage.   Minimal   reactive lymph nodes noted..

## 2021-12-29 NOTE — PROGRESS NOTE ADULT - PROBLEM SELECTOR PLAN 4
12/24-  CT maxillofacial with contrast and CT neck with contrast to r/o abscess. (-)  12/26- pain control., First mouth wash, local Lidocaine adm with Q tips.  Dental following

## 2021-12-29 NOTE — PROGRESS NOTE ADULT - SUBJECTIVE AND OBJECTIVE BOX
Follow Up:  neutropenic fever    Interval History: pt afebrile , had R lower gum pain, s/p PICC yesterday and started on Rabbit ATG/prednisone/promacta today    ROS:      All other systems negative    Constitutional: no fever, no chills  ENT:  R lower gum pain  Cardiovascular:  no chest pain, no palpitation  Respiratory:  no SOB, no cough  GI:  no abd pain, no vomiting, no diarrhea  urinary: no dysuria, no hematuria, no flank pain  musculoskeletal:  no joint pain, no joint swelling  skin:  no rash  neurology:  no headache, no seizure          Allergies  No Known Allergies        ANTIMICROBIALS:  acyclovir   Oral Tab/Cap 400 every 8 hours  cefepime   IVPB 2000 every 8 hours  fluconAZOLE   Tablet 200 daily      OTHER MEDS:  acetaminophen     Tablet .. 650 milliGRAM(s) Oral every 6 hours PRN  acetaminophen     Tablet .. 650 milliGRAM(s) Oral daily  aluminum hydroxide/magnesium hydroxide/simethicone Suspension 30 milliLiter(s) Oral every 4 hours PRN  antithymocyte globulin rabbit IVPB 350 milliGRAM(s) IV Intermittent daily  Biotene Dry Mouth Oral Rinse 15 milliLiter(s) Swish and Spit four times a day  calcium carbonate    500 mG (Tums) Chewable 1 Tablet(s) Chew every 4 hours PRN  chlorhexidine 0.12% Liquid 15 milliLiter(s) Swish and Spit two times a day  chlorhexidine 2% Cloths 1 Application(s) Topical <User Schedule>  cycloSPORINE  (SandIMMUNE) 325 milliGRAM(s) Oral every 12 hours  diphenhydrAMINE 25 milliGRAM(s) Oral every 12 hours PRN  diphenhydrAMINE Injectable 50 milliGRAM(s) IV Push daily  diphenhydrAMINE Injectable 25 milliGRAM(s) IV Push once PRN  eltrombopag 150 milliGRAM(s) Oral daily  EPINEPHrine     1 mG/mL Injectable 0.3 milliGRAM(s) IntraMuscular once PRN  famotidine    Tablet 20 milliGRAM(s) Oral two times a day  FIRST- Mouthwash  BLM 10 milliLiter(s) Swish and Spit four times a day  hydrocortisone sodium succinate Injectable 50 milliGRAM(s) IV Push every 12 hours PRN  lidocaine 2% Viscous 2 milliLiter(s) Oral four times a day  methylPREDNISolone sodium succinate IVPB 500 milliGRAM(s) IV Intermittent once  pantoprazole    Tablet 40 milliGRAM(s) Oral before breakfast  predniSONE   Tablet 100 milliGRAM(s) Oral daily  sodium chloride 0.65% Nasal 1 Spray(s) Both Nostrils four times a day  sodium chloride 0.9%. 1000 milliLiter(s) IV Continuous <Continuous>      Vital Signs Last 24 Hrs  T(C): 36.9 (29 Dec 2021 13:00), Max: 37.1 (28 Dec 2021 21:05)  T(F): 98.4 (29 Dec 2021 13:00), Max: 98.7 (28 Dec 2021 21:05)  HR: 83 (29 Dec 2021 13:00) (83 - 91)  BP: 148/93 (29 Dec 2021 13:00) (125/79 - 149/82)  BP(mean): --  RR: 18 (29 Dec 2021 13:00) (16 - 19)  SpO2: 100% (29 Dec 2021 13:00) (98% - 100%)    Physical Exam:  General:    NAD,  non toxic, sitting on a chair  Cardio:     regular S1, S2,  no murmur  Respiratory:    clear b/l,    no wheezing  abd:     soft,   BS +,   no tenderness  :   no CVAT,  no suprapubic tenderness,   no  richter  Musculoskeletal:   no joint swelling  vascular: no phlebitis, RUE picc  Skin:    no rash                            6.4    1.84  )-----------( 30       ( 29 Dec 2021 07:38 )             18.1       12-29    137  |  104  |  23  ----------------------------<  95  4.4   |  21<L>  |  0.93    Ca    8.9      29 Dec 2021 07:38  Phos  3.2     12-29  Mg     1.8     12-29    TPro  6.9  /  Alb  4.1  /  TBili  0.4  /  DBili  x   /  AST  13  /  ALT  31  /  AlkPhos  69  12-29          MICROBIOLOGY:  v  .Throat Throat  12-23-21   No Streptococcus pyogenes (Group A) isolated  --  --      .Throat Throat  12-23-21   No Streptococcus pyogenes (Group A) isolated  --  --      .Blood Blood-Peripheral  12-23-21   No Growth Final  --  --      Clean Catch Clean Catch (Midstream)  12-22-21   No growth  --  --      .Blood Blood  12-21-21   No Growth Final  --  --                RADIOLOGY:  Images independently visualized and reviewed personally, findings as below  < from: Xray Chest 1 View- PORTABLE-Urgent (Xray Chest 1 View- PORTABLE-Urgent .) (12.28.21 @ 17:44) >    IMPRESSION: Right upper extremity PICC line in good position.    < end of copied text >  < from: CT Neck Soft Tissue w/wo IV Cont (12.25.21 @ 12:00) >  IMPRESSION: Moderate mucosal thickening within the LEFT maxillary,   BILATERAL ethmoid and frontal sinuses. Minimal increased density seen   within the BILATERAL frontal sinuses which may reflect inspissated   secretions or possibly fungal material, less likely hemorrhage.   Minimal   reactive lymph nodes noted..      < end of copied text >  < from: CT Abdomen and Pelvis w/ IV Cont (12.23.21 @ 20:04) >  IMPRESSION:  No CT evidence for source of infection in the chest, abdomen or pelvis.      < end of copied text >

## 2021-12-30 LAB
ALBUMIN SERPL ELPH-MCNC: 3.8 G/DL — SIGNIFICANT CHANGE UP (ref 3.3–5)
ALP SERPL-CCNC: 65 U/L — SIGNIFICANT CHANGE UP (ref 40–120)
ALT FLD-CCNC: 24 U/L — SIGNIFICANT CHANGE UP (ref 10–45)
ANION GAP SERPL CALC-SCNC: 10 MMOL/L — SIGNIFICANT CHANGE UP (ref 5–17)
APTT BLD: 27.3 SEC — LOW (ref 27.5–35.5)
AST SERPL-CCNC: 12 U/L — SIGNIFICANT CHANGE UP (ref 10–40)
BILIRUB SERPL-MCNC: 0.6 MG/DL — SIGNIFICANT CHANGE UP (ref 0.2–1.2)
BUN SERPL-MCNC: 22 MG/DL — SIGNIFICANT CHANGE UP (ref 7–23)
CALCIUM SERPL-MCNC: 8.8 MG/DL — SIGNIFICANT CHANGE UP (ref 8.4–10.5)
CHLORIDE SERPL-SCNC: 106 MMOL/L — SIGNIFICANT CHANGE UP (ref 96–108)
CO2 SERPL-SCNC: 20 MMOL/L — LOW (ref 22–31)
CREAT SERPL-MCNC: 0.87 MG/DL — SIGNIFICANT CHANGE UP (ref 0.5–1.3)
CULTURE RESULTS: NO GROWTH — SIGNIFICANT CHANGE UP
FIBRINOGEN PPP-MCNC: 628 MG/DL — HIGH (ref 290–520)
GLUCOSE SERPL-MCNC: 112 MG/DL — HIGH (ref 70–99)
HCT VFR BLD CALC: 18.4 % — CRITICAL LOW (ref 39–50)
HGB BLD-MCNC: 6.8 G/DL — CRITICAL LOW (ref 13–17)
INR BLD: 1.15 RATIO — SIGNIFICANT CHANGE UP (ref 0.88–1.16)
LDH SERPL L TO P-CCNC: 181 U/L — SIGNIFICANT CHANGE UP (ref 50–242)
MAGNESIUM SERPL-MCNC: 1.7 MG/DL — SIGNIFICANT CHANGE UP (ref 1.6–2.6)
MCHC RBC-ENTMCNC: 31.2 PG — SIGNIFICANT CHANGE UP (ref 27–34)
MCHC RBC-ENTMCNC: 37 GM/DL — HIGH (ref 32–36)
MCV RBC AUTO: 84.4 FL — SIGNIFICANT CHANGE UP (ref 80–100)
NRBC # BLD: 0 /100 WBCS — SIGNIFICANT CHANGE UP (ref 0–0)
PHOSPHATE SERPL-MCNC: 3.1 MG/DL — SIGNIFICANT CHANGE UP (ref 2.5–4.5)
PLATELET # BLD AUTO: 18 K/UL — CRITICAL LOW (ref 150–400)
POTASSIUM SERPL-MCNC: 4.3 MMOL/L — SIGNIFICANT CHANGE UP (ref 3.5–5.3)
POTASSIUM SERPL-SCNC: 4.3 MMOL/L — SIGNIFICANT CHANGE UP (ref 3.5–5.3)
PROT SERPL-MCNC: 6.6 G/DL — SIGNIFICANT CHANGE UP (ref 6–8.3)
PROTHROM AB SERPL-ACNC: 13.7 SEC — HIGH (ref 10.6–13.6)
RBC # BLD: 2.18 M/UL — LOW (ref 4.2–5.8)
RBC # FLD: 12.2 % — SIGNIFICANT CHANGE UP (ref 10.3–14.5)
SODIUM SERPL-SCNC: 136 MMOL/L — SIGNIFICANT CHANGE UP (ref 135–145)
SPECIMEN SOURCE: SIGNIFICANT CHANGE UP
URATE SERPL-MCNC: 3.9 MG/DL — SIGNIFICANT CHANGE UP (ref 3.4–8.8)
WBC # BLD: 0.28 K/UL — CRITICAL LOW (ref 3.8–10.5)
WBC # FLD AUTO: 0.28 K/UL — CRITICAL LOW (ref 3.8–10.5)

## 2021-12-30 PROCEDURE — 71045 X-RAY EXAM CHEST 1 VIEW: CPT | Mod: 26

## 2021-12-30 PROCEDURE — 99232 SBSQ HOSP IP/OBS MODERATE 35: CPT

## 2021-12-30 PROCEDURE — 93010 ELECTROCARDIOGRAM REPORT: CPT

## 2021-12-30 RX ORDER — MAGNESIUM SULFATE 500 MG/ML
1 VIAL (ML) INJECTION ONCE
Refills: 0 | Status: COMPLETED | OUTPATIENT
Start: 2021-12-30 | End: 2021-12-30

## 2021-12-30 RX ORDER — DIPHENHYDRAMINE HCL 50 MG
50 CAPSULE ORAL ONCE
Refills: 0 | Status: COMPLETED | OUTPATIENT
Start: 2021-12-30 | End: 2021-12-30

## 2021-12-30 RX ORDER — METOCLOPRAMIDE HCL 10 MG
10 TABLET ORAL EVERY 6 HOURS
Refills: 0 | Status: DISCONTINUED | OUTPATIENT
Start: 2021-12-30 | End: 2022-01-20

## 2021-12-30 RX ORDER — METOCLOPRAMIDE HCL 10 MG
10 TABLET ORAL EVERY 6 HOURS
Refills: 0 | Status: DISCONTINUED | OUTPATIENT
Start: 2021-12-30 | End: 2021-12-30

## 2021-12-30 RX ORDER — ACETAMINOPHEN 500 MG
1000 TABLET ORAL ONCE
Refills: 0 | Status: COMPLETED | OUTPATIENT
Start: 2021-12-30 | End: 2021-12-30

## 2021-12-30 RX ORDER — ACETAMINOPHEN 500 MG
1000 TABLET ORAL ONCE
Refills: 0 | Status: DISCONTINUED | OUTPATIENT
Start: 2021-12-30 | End: 2022-01-20

## 2021-12-30 RX ORDER — ACETAMINOPHEN 500 MG
650 TABLET ORAL ONCE
Refills: 0 | Status: COMPLETED | OUTPATIENT
Start: 2021-12-30 | End: 2021-12-30

## 2021-12-30 RX ORDER — HYDROCORTISONE 20 MG
50 TABLET ORAL ONCE
Refills: 0 | Status: DISCONTINUED | OUTPATIENT
Start: 2021-12-30 | End: 2021-12-30

## 2021-12-30 RX ORDER — ONDANSETRON 8 MG/1
8 TABLET, FILM COATED ORAL EVERY 8 HOURS
Refills: 0 | Status: DISCONTINUED | OUTPATIENT
Start: 2021-12-30 | End: 2022-01-20

## 2021-12-30 RX ADMIN — DIPHENHYDRAMINE HYDROCHLORIDE AND LIDOCAINE HYDROCHLORIDE AND ALUMINUM HYDROXIDE AND MAGNESIUM HYDRO 10 MILLILITER(S): KIT at 12:03

## 2021-12-30 RX ADMIN — Medication 25 MILLIGRAM(S): at 23:44

## 2021-12-30 RX ADMIN — CEFEPIME 100 MILLIGRAM(S): 1 INJECTION, POWDER, FOR SOLUTION INTRAMUSCULAR; INTRAVENOUS at 21:20

## 2021-12-30 RX ADMIN — Medication 100 GRAM(S): at 11:33

## 2021-12-30 RX ADMIN — LIDOCAINE 2 MILLILITER(S): 4 CREAM TOPICAL at 12:04

## 2021-12-30 RX ADMIN — Medication 400 MILLIGRAM(S): at 21:20

## 2021-12-30 RX ADMIN — Medication 650 MILLIGRAM(S): at 08:43

## 2021-12-30 RX ADMIN — Medication 15 MILLILITER(S): at 12:02

## 2021-12-30 RX ADMIN — Medication 50 MILLIGRAM(S): at 09:05

## 2021-12-30 RX ADMIN — FLUCONAZOLE 200 MILLIGRAM(S): 150 TABLET ORAL at 12:14

## 2021-12-30 RX ADMIN — CHLORHEXIDINE GLUCONATE 1 APPLICATION(S): 213 SOLUTION TOPICAL at 08:38

## 2021-12-30 RX ADMIN — Medication 400 MILLIGRAM(S): at 05:28

## 2021-12-30 RX ADMIN — PANTOPRAZOLE SODIUM 40 MILLIGRAM(S): 20 TABLET, DELAYED RELEASE ORAL at 06:05

## 2021-12-30 RX ADMIN — Medication 1000 MILLIGRAM(S): at 21:49

## 2021-12-30 RX ADMIN — Medication 400 MILLIGRAM(S): at 13:57

## 2021-12-30 RX ADMIN — Medication 15 MILLILITER(S): at 23:52

## 2021-12-30 RX ADMIN — CEFEPIME 100 MILLIGRAM(S): 1 INJECTION, POWDER, FOR SOLUTION INTRAMUSCULAR; INTRAVENOUS at 05:28

## 2021-12-30 RX ADMIN — Medication 50 MILLIGRAM(S): at 09:03

## 2021-12-30 RX ADMIN — Medication 650 MILLIGRAM(S): at 14:31

## 2021-12-30 RX ADMIN — Medication 400 MILLIGRAM(S): at 21:19

## 2021-12-30 RX ADMIN — FAMOTIDINE 20 MILLIGRAM(S): 10 INJECTION INTRAVENOUS at 05:48

## 2021-12-30 RX ADMIN — Medication 50 MILLIGRAM(S): at 23:41

## 2021-12-30 RX ADMIN — ELTROMBOPAG OLAMINE 150 MILLIGRAM(S): 50 TABLET, FILM COATED ORAL at 12:04

## 2021-12-30 RX ADMIN — Medication 50 MILLIGRAM(S): at 00:53

## 2021-12-30 RX ADMIN — SODIUM CHLORIDE 100 MILLILITER(S): 9 INJECTION INTRAMUSCULAR; INTRAVENOUS; SUBCUTANEOUS at 11:34

## 2021-12-30 RX ADMIN — Medication 1 SPRAY(S): at 12:03

## 2021-12-30 RX ADMIN — SODIUM CHLORIDE 100 MILLILITER(S): 9 INJECTION INTRAMUSCULAR; INTRAVENOUS; SUBCUTANEOUS at 05:49

## 2021-12-30 RX ADMIN — CYCLOSPORINE 325 MILLIGRAM(S): 100 CAPSULE ORAL at 18:00

## 2021-12-30 RX ADMIN — FAMOTIDINE 20 MILLIGRAM(S): 10 INJECTION INTRAVENOUS at 08:52

## 2021-12-30 RX ADMIN — CEFEPIME 100 MILLIGRAM(S): 1 INJECTION, POWDER, FOR SOLUTION INTRAMUSCULAR; INTRAVENOUS at 13:56

## 2021-12-30 RX ADMIN — Medication 650 MILLIGRAM(S): at 00:53

## 2021-12-30 NOTE — PROGRESS NOTE ADULT - PROBLEM SELECTOR PLAN 1
Relapsed Aplastic Anemia   S/p ATG, CSA, and promacta with complete remission  BM bx on 11/9/20 revealed hypocellular marrow.  BMbx 12/2021showing erythroid predominance, myeloid and erythroid maturation, and markedly decreased megakaryocytes   Pt states he has not been able to receive Promacta outpatient x~10 days. Sent script to [facility] on 12/22 12/23  transferred  to [facility] for ATG treatment, plan on 12/24.  Monitor CBC with diff/lytes, transfuse and or replete PRN  Anemia PRBC x 1 unit   Monitor weight, I and O, mouth care  12/26- Start Cyclosporine 300 mg PO Q 12 hrs   12/27 test dose - no reaction noted  12/28 PICC placement  12/29 Started  Rabbit ATG/prednisone/promacta  12/29 SCA level 197, dose increased to 325 PO BID, follow up CSA level on Saturday 1/1/22 12/29 Reaction to ATG- Benadryl, Hydrocortisone given Relapsed Aplastic Anemia   S/p ATG, CSA, and promacta with complete remission  BM bx on 11/9/20 revealed hypocellular marrow.  BMbx 12/2021showing erythroid predominance, myeloid and erythroid maturation, and markedly decreased megakaryocytes   Pt states he has not been able to receive Promacta outpatient x~10 days. Sent script to [facility] at [facility] on 12/22 12/23  transferred  to [facility] for ATG treatment, plan on 12/24.  Monitor CBC with diff/lytes, transfuse and or replete PRN  Anemia PRBC x 1 unit   Thrombocytopenia Platelet x 1 unit   Hypomagnesemia- Magnesium sulphate 1 g IV x 1   Monitor weight, I and O, mouth care  12/26- Start Cyclosporine 300 mg PO Q 12 hrs   12/27 test dose - no reaction noted  12/28 PICC placement  12/29 Started  Rabbit ATG/prednisone/;  12/29 SCA level 197, dose increased to 325 PO BID, follow up CSA level on Saturday 1/1/22 12/29 Reaction to ATG- Benadryl, Hydrocortisone given   HELD on 12/30 due reaction ; Continue promacta  12/31 will reassess in am Relapsed Aplastic Anemia   S/p ATG, CSA, and promacta with complete remission  BM bx on 11/9/20 revealed hypocellular marrow.  BMbx 12/2021showing erythroid predominance, myeloid and erythroid maturation, and markedly decreased megakaryocytes   Pt states he has not been able to receive Promacta outpatient x~10 days. Sent script to [facility] at [facility] on 12/22 12/23  transferred  to [facility] for ATG treatment, plan on 12/24.  Monitor CBC with diff/lytes, transfuse and or replete PRN  Anemia PRBC x 1 unit   Thrombocytopenia Platelet x 1 unit   Hypomagnesemia- Magnesium sulphate 1 g IV x 1   Monitor weight, I and O, mouth care  12/26- Start Cyclosporine 300 mg PO Q 12 hrs   12/27 test dose - no reaction noted  12/28 PICC placement  12/29 Started  Rabbit ATG/prednisone/;  12/29 SCA level 197, dose increased to 325 PO BID, follow up CSA level on Saturday 1/1/22 12/29 Reaction to ATG- Benadryl, Hydrocortisone given   HELD on 12/30 due to reaction ; Continue promacta  12/31 will reassess in am

## 2021-12-30 NOTE — PROGRESS NOTE ADULT - ASSESSMENT
36M with Aplastic Anemia diagnosed April 2020.  Underwent treatment with equine ATG, Cyclosporine, and Promacta.  Went into remission (BM bx 5/2021 with normal morphology and cellularity).  Promacta stopped May 2021.  Cyclosporin stopped August.  By September, platelets started to drop.  Now with pancytopenia and neutropenic fever.  No obvious focus of infection, however, c/o sore throat.  RVP, blood cx, urine cx and throat cx negative  abd/plevis CT negative  neck soft tissue CT: some sinus changes  which could be secretions or fungal material but pt asymptomatic anyway    Neutropenic fever, negative infectious w/u (blood, urine throat cx)  Pericoronitis and mechanical trauma of soft tissue around #32, seen by dental  s/p PICC 12/28, started on ATG, cyclosporine, prednisone and promacta 12/29 and now fever, chills after, likely chemo related  * f/u the blood and urine cx  * c/w cefepime for now, started 12/21  * c/w acylovir and fluconazole ppx as per the protocol  * monitor CBC/diff and temp curve    The above assessment and plan was discussed with the primary team    Ritika Valiente MD  Pager [phone]  After 5pm and on weekends call [phone]

## 2021-12-30 NOTE — PROGRESS NOTE ADULT - ATTENDING COMMENTS
36M with Aplastic Anemia diagnosed April 2020.  Underwent treatment with equine ATG, Cyclosporine, and Promacta.  Went into remission (BM bx 5/2021 with normal morphology and cellularity).  Promacta stopped May 2021.  Cyclosporin stopped August 2021.  By September, platelets started to drop.  Now with pancytopenia and neutropenic fever.  No obvious focus of infection, however, c/o sorethroat.  On cyclosporine 300 mg BID (started on 12/26/21), first level on 12/29/21 -- PENDING  Today is day 4    - Febrile on 12/23/21, on cefepime and fluconazole, blood cultures negative, continue cefepime for now  - Nasal MRSA + for staph aureus (not detected though for MRSA). Will start vancomycin.   - Dental consult with x-rays, no dental source of patient's R sided gum pain. CT scan showed some sinus opacification but asymptomatic from that standpoint.  - Discussed with patient today about the possibility of going straight to allogeneic transplant, he has 30 year old sister. Have discussed transplant with patient extensively in the past.   - Afebrile now for over 24 hours. Discontinued vancomycin.   - 12/29 : Day 1 :cyclosporine as 300 mg BID (monitor level), rabit ATG, promacta  - Pt decided for rabbit ATG, s/p test dose 12/27 tolerated well. started 12/29 daily x5d.   - prednisone for serum sickness PPX 36M with Aplastic Anemia diagnosed April 2020.  Underwent treatment with equine ATG, Cyclosporine, and Promacta.  Went into remission (BM bx 5/2021 with normal morphology and cellularity).  Promacta stopped May 2021.  Cyclosporin stopped August 2021.  By September, platelets started to drop.  Now with pancytopenia and neutropenic fever.  No obvious focus of infection, however, c/o sorethroat.  On cyclosporine 300 mg BID (started on 12/26/21), first level on 12/29/21 -- PENDING  Today is day 5    Today: 12/30 tachy and febrile after dose 1 of ATG, EKG shows sinus tachy. Will hold dose 2. Reevaluate tomorrow.  - Febrile on 12/23/21, on cefepime and fluconazole, blood cultures negative, continue cefepime for now  - Nasal MRSA + for staph aureus (not detected though for MRSA). Will start vancomycin.   - Dental consult with x-rays, no dental source of patient's R sided gum pain. CT scan showed some sinus opacification but asymptomatic from that standpoint.  - Discussed with patient today about the possibility of going straight to allogeneic transplant, he has 30 year old sister. Have discussed transplant with patient extensively in the past.   - Afebrile now for over 24 hours. Discontinued vancomycin.   - 12/29 : Day 1 :cyclosporine as 300 mg BID (monitor level), rabit ATG, promacta  - Pt decided for rabbit ATG, s/p test dose 12/27 tolerated well. started 12/29 daily x5d.   - prednisone for serum sickness PPX

## 2021-12-30 NOTE — PROGRESS NOTE ADULT - SUBJECTIVE AND OBJECTIVE BOX
Diagnosis: Relapsed aplastic anemia    Protocol/Chemo Regimen:  Rabbit ATG/prednisone/promacta     Day: 2     Pt endorsed: right lower gum discomfort/pain- stable    Overnight events ATG reaction last night     Review of Systems: Denies any chest pain ,palpitation, SOB, nausea, vomiting or diarrhea.    Pain scale:    2-3/10         Location: right lower gum     Diet: regular    Allergies: No Known Allergies    ANTIMICROBIALS  acyclovir   Oral Tab/Cap 400 milliGRAM(s) Oral every 8 hours  cefepime   IVPB 2000 milliGRAM(s) IV Intermittent every 8 hours  fluconAZOLE   Tablet 200 milliGRAM(s) Oral daily      STANDING MEDICATIONS  Biotene Dry Mouth Oral Rinse 15 milliLiter(s) Swish and Spit four times a day  famotidine    Tablet 20 milliGRAM(s) Oral two times a day  FIRST- Mouthwash  BLM 10 milliLiter(s) Swish and Spit four times a day  sodium chloride 0.65% Nasal 1 Spray(s) Both Nostrils four times a day    PRN MEDICATIONS  acetaminophen     Tablet .. 650 milliGRAM(s) Oral every 6 hours PRN  Vital Signs Last 24 Hrs  T(C): 38.5 (30 Dec 2021 08:30), Max: 38.5 (30 Dec 2021 08:30)  T(F): 101.3 (30 Dec 2021 08:30), Max: 101.3 (30 Dec 2021 08:30)  HR: 130 (30 Dec 2021 08:30) (74 - 130)  BP: 100/58 (30 Dec 2021 08:30) (100/58 - 167/98)  BP(mean): 99 (30 Dec 2021 08:30) (99 - 99)  RR: 18 (30 Dec 2021 08:30) (18 - 20)  SpO2: 99% (30 Dec 2021 08:30) (96% - 100%)  PHYSICAL EXAM  General: adult in NAD  HEENT: clear oropharynx, anicteric sclera, tenderness + in right submandibular area.  CV: normal S1/S2 RRR  Lungs: positive air movement b/l ant lungs,clear to auscultation, no wheezes, no rales  Abdomen: soft, + BS,  non-tender non-distended  Ext: no edema in BLE   Skin: no rash  Neuro: alert and oriented X 3  Central Line: PIV CDI    LABS:                          6.8    0.28  )-----------( 18       ( 30 Dec 2021 07:10 )             18.4         Mean Cell Volume : 84.4 fl  Mean Cell Hemoglobin : 31.2 pg  Mean Cell Hemoglobin Concentration : 37.0 gm/dL  Auto Neutrophil # : x  Auto Lymphocyte # : x  Auto Monocyte # : x  Auto Eosinophil # : x  Auto Basophil # : x  Auto Neutrophil % : x  Auto Lymphocyte % : x  Auto Monocyte % : x  Auto Eosinophil % : x  Auto Basophil % : x      12-30    136  |  106  |  22  ----------------------------<  112<H>  4.3   |  20<L>  |  0.87    Ca    8.8      30 Dec 2021 07:09  Phos  3.1     12-30  Mg     1.7     12-30    TPro  6.6  /  Alb  3.8  /  TBili  0.6  /  DBili  x   /  AST  12  /  ALT  24  /  AlkPhos  65  12-30        PT/INR - ( 30 Dec 2021 07:10 )   PT: 13.7 sec;   INR: 1.15 ratio         PTT - ( 30 Dec 2021 07:10 )  PTT:27.3 sec      Uric Acid 3.9                    RECENT CULTURES:  12-23 @ 17:32  Throat Throat  No Streptococcus pyogenes (Group A) isolated    12-23 @ 14:58  .Throat Throat  No Streptococcus pyogenes (Group A) isolated    12-23 @ 09:16  .Blood Blood-Peripheral  No growth to date.    12-22 @ 01:14  Clean Catch Clean Catch (Midstream)  No growth    12-21 @ 17:15  .Blood Blood  No growth to date.      RADIOLOGY & ADDITIONAL STUDIES:    CT Neck Soft Tissue w/wo IV Cont (12.25.21 @ 12:00) >  Moderate mucosal thickening within the LEFT maxillary,   BILATERAL ethmoid and frontal sinuses. Minimal increased density seen   within the BILATERAL frontal sinuses which may reflect inspissated   secretions or possibly fungal material, less likely hemorrhage.   Minimal   reactive lymph nodes noted..         Diagnosis: Relapsed aplastic anemia    Protocol/Chemo Regimen:  Rabbit ATG/prednisone on HOLD due to reaction  /continue Promacta     Day: 2     Pt endorsed: right lower gum discomfort/pain- stable, vomited, nausea, blood tinged sputum    Overnight events ATG reaction last night     Review of Systems: Denies any chest pain ,palpitation, SOB,  diarrhea.    Pain scale:    2-3/10         Location: right lower gum     Diet: regular    Allergies: No Known Allergies    ANTIMICROBIALS  acyclovir   Oral Tab/Cap 400 milliGRAM(s) Oral every 8 hours  cefepime   IVPB 2000 milliGRAM(s) IV Intermittent every 8 hours  fluconAZOLE   Tablet 200 milliGRAM(s) Oral daily      STANDING MEDICATIONS  Biotene Dry Mouth Oral Rinse 15 milliLiter(s) Swish and Spit four times a day  famotidine    Tablet 20 milliGRAM(s) Oral two times a day  FIRST- Mouthwash  BLM 10 milliLiter(s) Swish and Spit four times a day  sodium chloride 0.65% Nasal 1 Spray(s) Both Nostrils four times a day    PRN MEDICATIONS  acetaminophen     Tablet .. 650 milliGRAM(s) Oral every 6 hours PRN  Vital Signs Last 24 Hrs  T(C): 38.5 (30 Dec 2021 08:30), Max: 38.5 (30 Dec 2021 08:30)  T(F): 101.3 (30 Dec 2021 08:30), Max: 101.3 (30 Dec 2021 08:30)  HR: 130 (30 Dec 2021 08:30) (74 - 130)  BP: 100/58 (30 Dec 2021 08:30) (100/58 - 167/98)  BP(mean): 99 (30 Dec 2021 08:30) (99 - 99)  RR: 18 (30 Dec 2021 08:30) (18 - 20)  SpO2: 99% (30 Dec 2021 08:30) (96% - 100%)  PHYSICAL EXAM  General: adult in NAD  HEENT: clear oropharynx, anicteric sclera, tenderness + in right submandibular area- improved   CV: normal S1/S2 tachy RR  Lungs: positive air movement b/l ant lungs,clear to auscultation, no wheezes, no rales  Abdomen: soft, + BS,  non-tender non-distended  Ext: no edema in BLE   Skin: no rash  Neuro: alert and oriented X 3  Central Line: PIV CDI    LABS:                          6.8    0.28  )-----------( 18       ( 30 Dec 2021 07:10 )             18.4         Mean Cell Volume : 84.4 fl  Mean Cell Hemoglobin : 31.2 pg  Mean Cell Hemoglobin Concentration : 37.0 gm/dL  Auto Neutrophil # : x  Auto Lymphocyte # : x  Auto Monocyte # : x  Auto Eosinophil # : x  Auto Basophil # : x  Auto Neutrophil % : x  Auto Lymphocyte % : x  Auto Monocyte % : x  Auto Eosinophil % : x  Auto Basophil % : x      12-30    136  |  106  |  22  ----------------------------<  112<H>  4.3   |  20<L>  |  0.87    Ca    8.8      30 Dec 2021 07:09  Phos  3.1     12-30  Mg     1.7     12-30    TPro  6.6  /  Alb  3.8  /  TBili  0.6  /  DBili  x   /  AST  12  /  ALT  24  /  AlkPhos  65  12-30        PT/INR - ( 30 Dec 2021 07:10 )   PT: 13.7 sec;   INR: 1.15 ratio         PTT - ( 30 Dec 2021 07:10 )  PTT:27.3 sec      Uric Acid 3.9        RECENT CULTURES:  12-23 @ 17:32  Throat Throat  No Streptococcus pyogenes (Group A) isolated    12-23 @ 14:58  .Throat Throat  No Streptococcus pyogenes (Group A) isolated    12-23 @ 09:16  .Blood Blood-Peripheral  No growth to date.    12-22 @ 01:14  Clean Catch Clean Catch (Midstream)  No growth    12-21 @ 17:15  .Blood Blood  No growth to date.      RADIOLOGY & ADDITIONAL STUDIES:    CT Neck Soft Tissue w/wo IV Cont (12.25.21 @ 12:00) >  Moderate mucosal thickening within the LEFT maxillary,   BILATERAL ethmoid and frontal sinuses. Minimal increased density seen   within the BILATERAL frontal sinuses which may reflect inspissated   secretions or possibly fungal material, less likely hemorrhage.   Minimal   reactive lymph nodes noted..

## 2021-12-30 NOTE — CHART NOTE - NSCHARTNOTEFT_GEN_A_CORE
MEDICINE PA    Notified by RN patient with temperature of 102.8F.   Seen and examined patient at bedside.   Patient is alert, NAD.   Denies HA, CP, SOB, cough, N/V, or abd pain.    VITAL SIGNS:  T(C): 38.7 (12-30-21 @ 18:35), Max: 39.5 (12-30-21 @ 16:06)  HR: 130 (12-30-21 @ 16:06) (86 - 130)  BP: 123/73 (12-30-21 @ 16:06) (99/56 - 139/94)  RR: 22 (12-30-21 @ 16:06) (18 - 22)  SpO2: 99% (12-30-21 @ 16:06) (96% - 100%)  Wt(kg): --      LABORATORY:                          6.8    0.28  )-----------( 18       ( 30 Dec 2021 07:10 )             18.4       12-30    136  |  106  |  22  ----------------------------<  112<H>  4.3   |  20<L>  |  0.87    Ca    8.8      30 Dec 2021 07:09  Phos  3.1     12-30  Mg     1.7     12-30    TPro  6.6  /  Alb  3.8  /  TBili  0.6  /  DBili  x   /  AST  12  /  ALT  24  /  AlkPhos  65  12-30          MICROBIOLOGY:   - Blood cultures collected 12/29/2021 and pending results.  - Urine culture collected 12/29/2021 and pending results.        RADIOLOGY:  < from: Xray Chest 1 View- PORTABLE-Urgent (Xray Chest 1 View- PORTABLE-Urgent .) (12.30.21 @ 11:22) >    IMPRESSION:  Clear lungs.        PHYSICAL EXAM:  Constitutional: AOx3. NAD.  Respiratory: clear lungs bilaterally. No wheezing, rhonchi, or crackles.  Cardiovascular: S1 S2. No murmurs.  Gastrointestinal: BS X4 active. soft. nontender.  Extremities/Vascular: +2 pulses bilaterally. No BLE edema.      ASSESSMENT/PLAN:   HPI:  36 y.o M hx of aplastic anemia dx via bone biospy at [facility] 4/2020 following with Dr. Goldberg and currently in treatment with cyclosporine who presents with  nose bleeding and fever of Tmax 103.  Last night pt's gf saw pt with rigors, described as extreme shaking throughout the night. In the morning, his fever was taken due to tactile fever with no chills which showed temp of 103.0 oral. Pt took Tylenol and said he was fine afterwards. Pt mentions he woke up with some new bruising on L arm and had nose bleed from b/l nostril. Pt states bleeding was not excessive but was persistently occurring intermittently throughout the day. Pt also mentions having some R. side jaw pain associated with toothache on lower back tooth. Pt denies any chest pain, shortness of breath, cough, nasal congestion, abdominal pain.    Now presents with recurrent neutropenic fever.      1.) Neutropenic Fever  - IV Tylenol and cooling measures prn for pyrexia.  - BC x2, UA/UC (see above).  - CXR (see above).  - C/w IV Cefepime, Acyclovir and Diflucan.  - Will continue to trend patient's fever curve, monitor clinical status and vital signs.  - Will endorse to the primary team in the AM.      Ariana Spear PA-C   Department of Medicine   Spectra [phone]

## 2021-12-30 NOTE — PROGRESS NOTE ADULT - PROBLEM SELECTOR PLAN 2
Neutropenic, febrile   continue cefepime , diflucan, Acyclovir for now  throat culture (-).  MRSA (-), staph aureus PCR +.  CT CAP  to look for source of infection- No CT evidence for source of infection in the chest, abdomen or pelvis.  12/24- Staph aureus PCR detected, will start Vanco 1 gm Q 12 hrs.  12/26- Vanco discontinued on 12/26 as patient continue to be afebrile and CT maxillo facial did not reveal any abscess.  ID following

## 2021-12-30 NOTE — PROGRESS NOTE ADULT - ASSESSMENT
36M with Aplastic Anemia diagnosed April 2020, s/p equine ATG, Cyclosporine, and Promacta. Bone marrow biopsy was done on 5/2021 revealed normal morphology and cellularity, eventually Promacta was stopped and Cyclosporin in 5/2021, in September 2021 noted his platelets dropping  now admitted with neutropenic fever found to have pancytopenia secondary to disease condition.  Bone marrow biopsy was repeated on 12/2021 showed decreased megakaryocytes; Rabbit ATG/ prednisone  started on 12/29/21. Hospital course complicated by ATG reaction, and neutropenic fever  Pt has pancytopenia secondary to chemotherapy and or disease   36M with Aplastic Anemia diagnosed April 2020, s/p equine ATG, Cyclosporine, and Promacta. Bone marrow biopsy was done on 5/2021 revealed normal morphology and cellularity, eventually Promacta was stopped and Cyclosporin in 5/2021, in September 2021 noted his platelets dropping  now admitted with neutropenic fever found to have pancytopenia secondary to disease condition.  Bone marrow biopsy was repeated on 12/2021 showed decreased megakaryocytes; Rabbit ATG/ prednisone  started on 12/29/21. Hospital course complicated by ATG reaction, and neutropenic fever. Pt has pancytopenia secondary to chemotherapy and or disease

## 2021-12-30 NOTE — CHART NOTE - NSCHARTNOTEFT_GEN_A_CORE
MEDICINE PA EPISODIC NOTE    Notified by RN of pt. having small episode of blood tinged emesis. Pt. seen and examined at bedside, sitting upright, AOx3, in NAD. Upon questioning, pt. states he had 1 episode of emesis otherwise feeling better. Pt. denies lightheadedness, dizziness, HA, CP, palpitations, difficulty breathing. Noted to be tachycardic w/ HR of 104. VSS otherwise. Will continue to monitor and endorse to day team in AM. Consider PRN anti-nausea medication if pt. continues to have nausea/vomiting. Monitor AM labs.    Sandra Tavares PA-C  Dept. of Medicine  Spectra [phone]

## 2021-12-30 NOTE — PROGRESS NOTE ADULT - SUBJECTIVE AND OBJECTIVE BOX
Follow Up:  neutropenic fever    Interval History: pt afebrile , had R lower gum pain, s/p PICC yesterday and started on Rabbit ATG/prednisone/promacta yesterday and has been febrile after    ROS:      All other systems negative    Constitutional: + fever, chills  ENT:  R lower gum pain  Cardiovascular:  no chest pain, no palpitation  Respiratory:  no SOB, no cough  GI:  no abd pain, no vomiting, no diarrhea  urinary: no dysuria, no hematuria, no flank pain  musculoskeletal:  no joint pain, no joint swelling  skin:  no rash  neurology:  no headache, no seizure        Allergies  No Known Allergies        ANTIMICROBIALS:  acyclovir   Oral Tab/Cap 400 every 8 hours  cefepime   IVPB 2000 every 8 hours  fluconAZOLE   Tablet 200 daily      OTHER MEDS:  acetaminophen     Tablet .. 650 milliGRAM(s) Oral every 6 hours PRN  acetaminophen     Tablet .. 650 milliGRAM(s) Oral daily  aluminum hydroxide/magnesium hydroxide/simethicone Suspension 30 milliLiter(s) Oral every 4 hours PRN  antithymocyte globulin rabbit IVPB 350 milliGRAM(s) IV Intermittent daily  Biotene Dry Mouth Oral Rinse 15 milliLiter(s) Swish and Spit four times a day  calcium carbonate    500 mG (Tums) Chewable 1 Tablet(s) Chew every 4 hours PRN  chlorhexidine 2% Cloths 1 Application(s) Topical <User Schedule>  cycloSPORINE  (SandIMMUNE) 325 milliGRAM(s) Oral every 12 hours  diphenhydrAMINE 25 milliGRAM(s) Oral every 12 hours PRN  diphenhydrAMINE Injectable 50 milliGRAM(s) IV Push daily  diphenhydrAMINE Injectable 25 milliGRAM(s) IV Push once PRN  eltrombopag 150 milliGRAM(s) Oral daily  EPINEPHrine     1 mG/mL Injectable 0.3 milliGRAM(s) IntraMuscular once PRN  famotidine Injectable 20 milliGRAM(s) IV Push every 12 hours  FIRST- Mouthwash  BLM 10 milliLiter(s) Swish and Spit four times a day  hydrocortisone sodium succinate Injectable 50 milliGRAM(s) IV Push every 12 hours PRN  lidocaine 2% Viscous 2 milliLiter(s) Oral four times a day  methylPREDNISolone sodium succinate IVPB 500 milliGRAM(s) IV Intermittent once  metoclopramide Injectable 10 milliGRAM(s) IV Push every 6 hours PRN  ondansetron Injectable 8 milliGRAM(s) IV Push every 8 hours PRN  pantoprazole    Tablet 40 milliGRAM(s) Oral before breakfast  predniSONE   Tablet 100 milliGRAM(s) Oral daily  sodium chloride 0.65% Nasal 1 Spray(s) Both Nostrils four times a day  sodium chloride 0.9%. 1000 milliLiter(s) IV Continuous <Continuous>      Vital Signs Last 24 Hrs  T(C): 37.7 (30 Dec 2021 13:40), Max: 38.5 (30 Dec 2021 08:30)  T(F): 99.9 (30 Dec 2021 13:40), Max: 101.3 (30 Dec 2021 08:30)  HR: 99 (30 Dec 2021 13:40) (74 - 130)  BP: 104/67 (30 Dec 2021 13:40) (99/56 - 167/98)  BP(mean): --  RR: 18 (30 Dec 2021 13:40) (18 - 20)  SpO2: 97% (30 Dec 2021 13:40) (96% - 100%)    Physical Exam:  General:    NAD,  non toxic  Cardio:     regular S1, S2,  no murmur  Respiratory:    clear b/l,    no wheezing  abd:     soft,   BS +,   no tenderness  :   no CVAT,  no suprapubic tenderness,   no  richter  Musculoskeletal:   no joint swelling  vascular: no phlebitis, RUE picc  Skin:    no rash                          6.8    0.28  )-----------( 18       ( 30 Dec 2021 07:10 )             18.4           136  |  106  |  22  ----------------------------<  112<H>  4.3   |  20<L>  |  0.87    Ca    8.8      30 Dec 2021 07:09  Phos  3.1     12-  Mg     1.7         TPro  6.6  /  Alb  3.8  /  TBili  0.6  /  DBili  x   /  AST  12  /  ALT  24  /  AlkPhos  65        Urinalysis Basic - ( 29 Dec 2021 22:35 )    Color: Light Yellow / Appearance: Clear / S.016 / pH: x  Gluc: x / Ketone: Negative  / Bili: Negative / Urobili: Negative   Blood: x / Protein: Negative / Nitrite: Negative   Leuk Esterase: Negative / RBC: x / WBC x   Sq Epi: x / Non Sq Epi: x / Bacteria: x        MICROBIOLOGY:  v  .Throat Throat  21   No Streptococcus pyogenes (Group A) isolated  --  --      .Throat Throat  21   No Streptococcus pyogenes (Group A) isolated  --  --      .Blood Blood-Peripheral  21   No Growth Final  --  --      Clean Catch Clean Catch (Midstream)  21   No growth  --  --      .Blood Blood  21   No Growth Final  --  --                RADIOLOGY:  Images independently visualized and reviewed personally, findings as below  < from: Xray Chest 1 View- PORTABLE-Urgent (Xray Chest 1 View- PORTABLE-Urgent .) (21 @ 17:44) >  FINDINGS:  The cardiac silhouette is normal in size. There are no focal   consolidations or pleural effusions. The hilar and mediastinal structures   appear unremarkable. The osseous structures are intact. There is a right   upper extremity PICC line withtip overlying the superior vena cava.    IMPRESSION: Right upper extremity PICC line in good position.    < end of copied text >  < from: CT Neck Soft Tissue w/wo IV Cont (21 @ 12:00) >    IMPRESSION: Moderate mucosal thickening within the LEFT maxillary,   BILATERAL ethmoid and frontal sinuses. Minimal increased density seen   within the BILATERAL frontal sinuses which may reflect inspissated   secretions or possibly fungal material, less likely hemorrhage.   Minimal   reactive lymph nodes noted..      < end of copied text >  < from: CT Abdomen and Pelvis w/ IV Cont (21 @ 20:04) >  IMPRESSION:  No CT evidence for source of infection in the chest, abdomen or pelvis.    < end of copied text >

## 2021-12-31 LAB
ALBUMIN SERPL ELPH-MCNC: 3.4 G/DL — SIGNIFICANT CHANGE UP (ref 3.3–5)
ALP SERPL-CCNC: 57 U/L — SIGNIFICANT CHANGE UP (ref 40–120)
ALT FLD-CCNC: 24 U/L — SIGNIFICANT CHANGE UP (ref 10–45)
ANION GAP SERPL CALC-SCNC: 10 MMOL/L — SIGNIFICANT CHANGE UP (ref 5–17)
APTT BLD: 34.4 SEC — SIGNIFICANT CHANGE UP (ref 27.5–35.5)
AST SERPL-CCNC: 13 U/L — SIGNIFICANT CHANGE UP (ref 10–40)
BILIRUB SERPL-MCNC: 0.7 MG/DL — SIGNIFICANT CHANGE UP (ref 0.2–1.2)
BLD GP AB SCN SERPL QL: NEGATIVE — SIGNIFICANT CHANGE UP
BUN SERPL-MCNC: 26 MG/DL — HIGH (ref 7–23)
CALCIUM SERPL-MCNC: 8.4 MG/DL — SIGNIFICANT CHANGE UP (ref 8.4–10.5)
CHLORIDE SERPL-SCNC: 108 MMOL/L — SIGNIFICANT CHANGE UP (ref 96–108)
CO2 SERPL-SCNC: 20 MMOL/L — LOW (ref 22–31)
CREAT SERPL-MCNC: 1.14 MG/DL — SIGNIFICANT CHANGE UP (ref 0.5–1.3)
FIBRINOGEN PPP-MCNC: 828 MG/DL — HIGH (ref 290–520)
GLUCOSE SERPL-MCNC: 109 MG/DL — HIGH (ref 70–99)
HCT VFR BLD CALC: 20.1 % — CRITICAL LOW (ref 39–50)
HGB BLD-MCNC: 7.3 G/DL — LOW (ref 13–17)
INR BLD: 1.28 RATIO — HIGH (ref 0.88–1.16)
LDH SERPL L TO P-CCNC: 168 U/L — SIGNIFICANT CHANGE UP (ref 50–242)
MAGNESIUM SERPL-MCNC: 1.9 MG/DL — SIGNIFICANT CHANGE UP (ref 1.6–2.6)
MCHC RBC-ENTMCNC: 30.4 PG — SIGNIFICANT CHANGE UP (ref 27–34)
MCHC RBC-ENTMCNC: 36.3 GM/DL — HIGH (ref 32–36)
MCV RBC AUTO: 83.8 FL — SIGNIFICANT CHANGE UP (ref 80–100)
NRBC # BLD: 0 /100 WBCS — SIGNIFICANT CHANGE UP (ref 0–0)
PHOSPHATE SERPL-MCNC: 3.7 MG/DL — SIGNIFICANT CHANGE UP (ref 2.5–4.5)
PLATELET # BLD AUTO: 31 K/UL — LOW (ref 150–400)
POTASSIUM SERPL-MCNC: 3.8 MMOL/L — SIGNIFICANT CHANGE UP (ref 3.5–5.3)
POTASSIUM SERPL-SCNC: 3.8 MMOL/L — SIGNIFICANT CHANGE UP (ref 3.5–5.3)
PROT SERPL-MCNC: 6.1 G/DL — SIGNIFICANT CHANGE UP (ref 6–8.3)
PROTHROM AB SERPL-ACNC: 15.2 SEC — HIGH (ref 10.6–13.6)
RBC # BLD: 2.4 M/UL — LOW (ref 4.2–5.8)
RBC # FLD: 12.9 % — SIGNIFICANT CHANGE UP (ref 10.3–14.5)
RH IG SCN BLD-IMP: POSITIVE — SIGNIFICANT CHANGE UP
SODIUM SERPL-SCNC: 138 MMOL/L — SIGNIFICANT CHANGE UP (ref 135–145)
URATE SERPL-MCNC: 4.1 MG/DL — SIGNIFICANT CHANGE UP (ref 3.4–8.8)
WBC # BLD: 0.34 K/UL — CRITICAL LOW (ref 3.8–10.5)
WBC # FLD AUTO: 0.34 K/UL — CRITICAL LOW (ref 3.8–10.5)

## 2021-12-31 PROCEDURE — 99233 SBSQ HOSP IP/OBS HIGH 50: CPT

## 2021-12-31 RX ADMIN — Medication 650 MILLIGRAM(S): at 17:38

## 2021-12-31 RX ADMIN — CHLORHEXIDINE GLUCONATE 1 APPLICATION(S): 213 SOLUTION TOPICAL at 16:00

## 2021-12-31 RX ADMIN — Medication 15 MILLILITER(S): at 17:38

## 2021-12-31 RX ADMIN — CYCLOSPORINE 325 MILLIGRAM(S): 100 CAPSULE ORAL at 05:58

## 2021-12-31 RX ADMIN — SODIUM CHLORIDE 100 MILLILITER(S): 9 INJECTION INTRAMUSCULAR; INTRAVENOUS; SUBCUTANEOUS at 06:36

## 2021-12-31 RX ADMIN — Medication 15 MILLILITER(S): at 11:32

## 2021-12-31 RX ADMIN — PANTOPRAZOLE SODIUM 40 MILLIGRAM(S): 20 TABLET, DELAYED RELEASE ORAL at 05:58

## 2021-12-31 RX ADMIN — CEFEPIME 100 MILLIGRAM(S): 1 INJECTION, POWDER, FOR SOLUTION INTRAMUSCULAR; INTRAVENOUS at 13:33

## 2021-12-31 RX ADMIN — CEFEPIME 100 MILLIGRAM(S): 1 INJECTION, POWDER, FOR SOLUTION INTRAMUSCULAR; INTRAVENOUS at 21:20

## 2021-12-31 RX ADMIN — ELTROMBOPAG OLAMINE 150 MILLIGRAM(S): 50 TABLET, FILM COATED ORAL at 11:32

## 2021-12-31 RX ADMIN — CEFEPIME 100 MILLIGRAM(S): 1 INJECTION, POWDER, FOR SOLUTION INTRAMUSCULAR; INTRAVENOUS at 05:56

## 2021-12-31 RX ADMIN — Medication 400 MILLIGRAM(S): at 21:20

## 2021-12-31 RX ADMIN — Medication 1 SPRAY(S): at 17:39

## 2021-12-31 RX ADMIN — Medication 15 MILLILITER(S): at 05:56

## 2021-12-31 RX ADMIN — Medication 400 MILLIGRAM(S): at 13:32

## 2021-12-31 RX ADMIN — FLUCONAZOLE 200 MILLIGRAM(S): 150 TABLET ORAL at 11:32

## 2021-12-31 RX ADMIN — CYCLOSPORINE 325 MILLIGRAM(S): 100 CAPSULE ORAL at 17:44

## 2021-12-31 RX ADMIN — Medication 1 SPRAY(S): at 11:32

## 2021-12-31 RX ADMIN — SODIUM CHLORIDE 100 MILLILITER(S): 9 INJECTION INTRAMUSCULAR; INTRAVENOUS; SUBCUTANEOUS at 21:22

## 2021-12-31 RX ADMIN — Medication 400 MILLIGRAM(S): at 05:57

## 2021-12-31 NOTE — PROGRESS NOTE ADULT - SUBJECTIVE AND OBJECTIVE BOX
Diagnosis: Relapsed aplastic anemia    Protocol/Chemo Regimen:  Rabbit ATG/prednisone on HOLD due to reaction  /continue Promacta     Day: 3       Pt endorsed:    Review of Systems:      Pain scale:                                        Location:    Diet:     Allergies: No Known Allergies      ANTIMICROBIALS  acyclovir   Oral Tab/Cap 400 milliGRAM(s) Oral every 8 hours  cefepime   IVPB 2000 milliGRAM(s) IV Intermittent every 8 hours  fluconAZOLE   Tablet 200 milliGRAM(s) Oral daily      HEME/ONC MEDICATIONS  eltrombopag 150 milliGRAM(s) Oral daily      STANDING MEDICATIONS  acetaminophen     Tablet .. 650 milliGRAM(s) Oral daily  Biotene Dry Mouth Oral Rinse 15 milliLiter(s) Swish and Spit four times a day  chlorhexidine 2% Cloths 1 Application(s) Topical <User Schedule>  cycloSPORINE  (SandIMMUNE) 325 milliGRAM(s) Oral every 12 hours  diphenhydrAMINE Injectable 50 milliGRAM(s) IV Push daily  famotidine Injectable 20 milliGRAM(s) IV Push every 12 hours  FIRST- Mouthwash  BLM 10 milliLiter(s) Swish and Spit four times a day  lidocaine 2% Viscous 2 milliLiter(s) Oral four times a day  methylPREDNISolone sodium succinate IVPB 500 milliGRAM(s) IV Intermittent once  pantoprazole    Tablet 40 milliGRAM(s) Oral before breakfast  sodium chloride 0.65% Nasal 1 Spray(s) Both Nostrils four times a day  sodium chloride 0.9%. 1000 milliLiter(s) IV Continuous <Continuous>      PRN MEDICATIONS  acetaminophen     Tablet .. 650 milliGRAM(s) Oral every 6 hours PRN  acetaminophen   IVPB .. 1000 milliGRAM(s) IV Intermittent once PRN  aluminum hydroxide/magnesium hydroxide/simethicone Suspension 30 milliLiter(s) Oral every 4 hours PRN  calcium carbonate    500 mG (Tums) Chewable 1 Tablet(s) Chew every 4 hours PRN  diphenhydrAMINE 25 milliGRAM(s) Oral every 12 hours PRN  diphenhydrAMINE Injectable 25 milliGRAM(s) IV Push once PRN  EPINEPHrine     1 mG/mL Injectable 0.3 milliGRAM(s) IntraMuscular once PRN  hydrocortisone sodium succinate Injectable 50 milliGRAM(s) IV Push every 12 hours PRN  metoclopramide Injectable 10 milliGRAM(s) IV Push every 6 hours PRN  ondansetron Injectable 8 milliGRAM(s) IV Push every 8 hours PRN      Vital Signs Last 24 Hrs  T(C): 37.2 (31 Dec 2021 03:00), Max: 39.5 (30 Dec 2021 16:06)  T(F): 99 (31 Dec 2021 03:00), Max: 103.1 (30 Dec 2021 16:06)  HR: 94 (31 Dec 2021 03:00) (91 - 130)  BP: 131/75 (31 Dec 2021 03:00) (99/56 - 139/94)  RR: 18 (31 Dec 2021 03:00) (18 - 22)  SpO2: 99% (31 Dec 2021 03:00) (97% - 100%)    PHYSICAL EXAM  General: adult in NAD  HEENT: clear oropharynx, anicteric sclera, pink conjunctiva  Neck: supple  CV: normal S1/S2 RRR  Lungs: positive air movement b/l ant lungs,clear to auscultation, no wheezes, no rales  Abdomen: soft non-tender non-distended, no hepatosplenomegaly  Ext: no clubbing cyanosis or edema  Skin: no rashes and no petechiae  Neuro: alert and oriented X 4, no focal deficits  Central Line: normal    LABS:    Blood Cultures:                           6.8    0.28  )-----------( 18       ( 30 Dec 2021 07:10 )             18.4         Mean Cell Volume : 84.4 fl  Mean Cell Hemoglobin : 31.2 pg  Mean Cell Hemoglobin Concentration : 37.0 gm/dL  Auto Neutrophil # : x  Auto Lymphocyte # : x  Auto Monocyte # : x  Auto Eosinophil # : x  Auto Basophil # : x  Auto Neutrophil % : x  Auto Lymphocyte % : x  Auto Monocyte % : x  Auto Eosinophil % : x  Auto Basophil % : x      12-30    136  |  106  |  22  ----------------------------<  112<H>  4.3   |  20<L>  |  0.87    Ca    8.8      30 Dec 2021 07:09  Phos  3.1     12-30  Mg     1.7     12-30    TPro  6.6  /  Alb  3.8  /  TBili  0.6  /  DBili  x   /  AST  12  /  ALT  24  /  AlkPhos  65  12-30      Mg 1.7  Phos 3.1      PT/INR - ( 30 Dec 2021 07:10 )   PT: 13.7 sec;   INR: 1.15 ratio         PTT - ( 30 Dec 2021 07:10 )  PTT:27.3 sec      Uric Acid 3.9        RADIOLOGY & ADDITIONAL STUDIES:         Diagnosis: Relapsed aplastic anemia    Protocol/Chemo Regimen:  Rabbit ATG/prednisone on HOLD due to reaction  /continue Promacta     Day: 3    Pt endorsed: fever overnight with chills, mild headache    Review of Systems: all other ROS negative    Pain scale:                                        Location:    Diet: regular    Allergies: No Known Allergies    ANTIMICROBIALS  acyclovir   Oral Tab/Cap 400 milliGRAM(s) Oral every 8 hours  cefepime   IVPB 2000 milliGRAM(s) IV Intermittent every 8 hours  fluconAZOLE   Tablet 200 milliGRAM(s) Oral daily      HEME/ONC MEDICATIONS  eltrombopag 150 milliGRAM(s) Oral daily      STANDING MEDICATIONS  acetaminophen     Tablet .. 650 milliGRAM(s) Oral daily  Biotene Dry Mouth Oral Rinse 15 milliLiter(s) Swish and Spit four times a day  chlorhexidine 2% Cloths 1 Application(s) Topical <User Schedule>  cycloSPORINE  (SandIMMUNE) 325 milliGRAM(s) Oral every 12 hours  diphenhydrAMINE Injectable 50 milliGRAM(s) IV Push daily  famotidine Injectable 20 milliGRAM(s) IV Push every 12 hours  FIRST- Mouthwash  BLM 10 milliLiter(s) Swish and Spit four times a day  lidocaine 2% Viscous 2 milliLiter(s) Oral four times a day  methylPREDNISolone sodium succinate IVPB 500 milliGRAM(s) IV Intermittent once  pantoprazole    Tablet 40 milliGRAM(s) Oral before breakfast  sodium chloride 0.65% Nasal 1 Spray(s) Both Nostrils four times a day  sodium chloride 0.9%. 1000 milliLiter(s) IV Continuous <Continuous>      PRN MEDICATIONS  acetaminophen     Tablet .. 650 milliGRAM(s) Oral every 6 hours PRN  acetaminophen   IVPB .. 1000 milliGRAM(s) IV Intermittent once PRN  aluminum hydroxide/magnesium hydroxide/simethicone Suspension 30 milliLiter(s) Oral every 4 hours PRN  calcium carbonate    500 mG (Tums) Chewable 1 Tablet(s) Chew every 4 hours PRN  diphenhydrAMINE 25 milliGRAM(s) Oral every 12 hours PRN  diphenhydrAMINE Injectable 25 milliGRAM(s) IV Push once PRN  EPINEPHrine     1 mG/mL Injectable 0.3 milliGRAM(s) IntraMuscular once PRN  hydrocortisone sodium succinate Injectable 50 milliGRAM(s) IV Push every 12 hours PRN  metoclopramide Injectable 10 milliGRAM(s) IV Push every 6 hours PRN  ondansetron Injectable 8 milliGRAM(s) IV Push every 8 hours PRN      Vital Signs Last 24 Hrs  T(C): 37.2 (31 Dec 2021 03:00), Max: 39.5 (30 Dec 2021 16:06)  T(F): 99 (31 Dec 2021 03:00), Max: 103.1 (30 Dec 2021 16:06)  HR: 94 (31 Dec 2021 03:00) (91 - 130)  BP: 131/75 (31 Dec 2021 03:00) (99/56 - 139/94)  RR: 18 (31 Dec 2021 03:00) (18 - 22)  SpO2: 99% (31 Dec 2021 03:00) (97% - 100%)    PHYSICAL EXAM  General: adult in NAD  HEENT: clear oropharynx, anicteric sclera, pink conjunctiva  Neck: supple  CV: normal S1/S2 RRR  Lungs: positive air movement b/l ant lungs,clear to auscultation, no wheezes, no rales  Abdomen: soft non-tender non-distended, no hepatosplenomegaly  Ext: no clubbing cyanosis or edema  Skin: no rashes and no petechiae  Neuro: alert and oriented X 4, no focal deficits  Central Line: normal    LABS:    Blood Cultures:                           6.8    0.28  )-----------( 18       ( 30 Dec 2021 07:10 )             18.4         Mean Cell Volume : 84.4 fl  Mean Cell Hemoglobin : 31.2 pg  Mean Cell Hemoglobin Concentration : 37.0 gm/dL  Auto Neutrophil # : x  Auto Lymphocyte # : x  Auto Monocyte # : x  Auto Eosinophil # : x  Auto Basophil # : x  Auto Neutrophil % : x  Auto Lymphocyte % : x  Auto Monocyte % : x  Auto Eosinophil % : x  Auto Basophil % : x      12-30    136  |  106  |  22  ----------------------------<  112<H>  4.3   |  20<L>  |  0.87    Ca    8.8      30 Dec 2021 07:09  Phos  3.1     12-30  Mg     1.7     12-30    TPro  6.6  /  Alb  3.8  /  TBili  0.6  /  DBili  x   /  AST  12  /  ALT  24  /  AlkPhos  65  12-30      Mg 1.7  Phos 3.1      PT/INR - ( 30 Dec 2021 07:10 )   PT: 13.7 sec;   INR: 1.15 ratio         PTT - ( 30 Dec 2021 07:10 )  PTT:27.3 sec      Uric Acid 3.9        RADIOLOGY & ADDITIONAL STUDIES:         Diagnosis: Relapsed aplastic anemia    Protocol/Chemo Regimen:  Rabbit ATG/prednisone on HOLD due to reaction  /continue Promacta     Day: 3    Pt endorsed: fever overnight with chills, mild headache    Review of Systems: all other ROS negative    Pain scale:                                        Location:    Diet: regular    Allergies: No Known Allergies    ANTIMICROBIALS  acyclovir   Oral Tab/Cap 400 milliGRAM(s) Oral every 8 hours  cefepime   IVPB 2000 milliGRAM(s) IV Intermittent every 8 hours  fluconAZOLE   Tablet 200 milliGRAM(s) Oral daily      HEME/ONC MEDICATIONS  eltrombopag 150 milliGRAM(s) Oral daily      STANDING MEDICATIONS  acetaminophen     Tablet .. 650 milliGRAM(s) Oral daily  Biotene Dry Mouth Oral Rinse 15 milliLiter(s) Swish and Spit four times a day  chlorhexidine 2% Cloths 1 Application(s) Topical <User Schedule>  cycloSPORINE  (SandIMMUNE) 325 milliGRAM(s) Oral every 12 hours  diphenhydrAMINE Injectable 50 milliGRAM(s) IV Push daily  famotidine Injectable 20 milliGRAM(s) IV Push every 12 hours  FIRST- Mouthwash  BLM 10 milliLiter(s) Swish and Spit four times a day  lidocaine 2% Viscous 2 milliLiter(s) Oral four times a day  methylPREDNISolone sodium succinate IVPB 500 milliGRAM(s) IV Intermittent once  pantoprazole    Tablet 40 milliGRAM(s) Oral before breakfast  sodium chloride 0.65% Nasal 1 Spray(s) Both Nostrils four times a day  sodium chloride 0.9%. 1000 milliLiter(s) IV Continuous <Continuous>      PRN MEDICATIONS  acetaminophen     Tablet .. 650 milliGRAM(s) Oral every 6 hours PRN  acetaminophen   IVPB .. 1000 milliGRAM(s) IV Intermittent once PRN  aluminum hydroxide/magnesium hydroxide/simethicone Suspension 30 milliLiter(s) Oral every 4 hours PRN  calcium carbonate    500 mG (Tums) Chewable 1 Tablet(s) Chew every 4 hours PRN  diphenhydrAMINE 25 milliGRAM(s) Oral every 12 hours PRN  diphenhydrAMINE Injectable 25 milliGRAM(s) IV Push once PRN  EPINEPHrine     1 mG/mL Injectable 0.3 milliGRAM(s) IntraMuscular once PRN  hydrocortisone sodium succinate Injectable 50 milliGRAM(s) IV Push every 12 hours PRN  metoclopramide Injectable 10 milliGRAM(s) IV Push every 6 hours PRN  ondansetron Injectable 8 milliGRAM(s) IV Push every 8 hours PRN      Vital Signs Last 24 Hrs  T(C): 37.2 (31 Dec 2021 03:00), Max: 39.5 (30 Dec 2021 16:06)  T(F): 99 (31 Dec 2021 03:00), Max: 103.1 (30 Dec 2021 16:06)  HR: 94 (31 Dec 2021 03:00) (91 - 130)  BP: 131/75 (31 Dec 2021 03:00) (99/56 - 139/94)  RR: 18 (31 Dec 2021 03:00) (18 - 22)  SpO2: 99% (31 Dec 2021 03:00) (97% - 100%)    PHYSICAL EXAM  General: adult in NAD  HEENT: clear oropharynx  Neck: supple  CV: normal S1/S2 RRR  Lungs: positive air movement b/l ant lungs,clear to auscultation, no wheezes, no rales  Abdomen: soft non-tender non-distended  Ext: no edema  Skin: no rashes and no petechiae  Neuro: alert and oriented X 4, no focal deficits  Central Line: picc c/d/i    Cultures:  Culture - Urine (12.30.21 @ 00:57)    Specimen Source: Clean Catch Clean Catch (Midstream)    Culture Results:   No growth    Culture - Blood (12.29.21 @ 21:56)    Specimen Source: .Blood Blood-Peripheral    Culture Results:   No growth to date.    Culture - Blood (12.29.21 @ 21:56)    Specimen Source: .Blood Blood-Catheter    Culture Results:   No growth to date.    LABS:                      7.3    0.34  )-----------( 31       ( 31 Dec 2021 07:29 )             20.1     31 Dec 2021 07:31    138    |  108    |  26     ----------------------------<  109    3.8     |  20     |  1.14     Ca    8.4        31 Dec 2021 07:31  Phos  3.7       31 Dec 2021 07:31  Mg     1.9       31 Dec 2021 07:31    TPro  6.1    /  Alb  3.4    /  TBili  0.7    /  DBili  x      /  AST  13     /  ALT  24     /  AlkPhos  57     31 Dec 2021 07:31    PT/INR - ( 31 Dec 2021 07:31 )   PT: 15.2 sec;   INR: 1.28 ratio    PTT - ( 31 Dec 2021 07:31 )  PTT:34.4 sec    LIVER FUNCTIONS - ( 31 Dec 2021 07:31 )  Alb: 3.4 g/dL / Pro: 6.1 g/dL / ALK PHOS: 57 U/L / ALT: 24 U/L / AST: 13 U/L / GGT: x

## 2021-12-31 NOTE — PROGRESS NOTE ADULT - ATTENDING COMMENTS
36M with Aplastic Anemia diagnosed April 2020.  Underwent treatment with equine ATG, Cyclosporine, and Promacta.  Went into remission (BM bx 5/2021 with normal morphology and cellularity).  Promacta stopped May 2021.  Cyclosporin stopped August 2021.  By September, platelets started to drop.  Now with pancytopenia and neutropenic fever.  No obvious focus of infection, however, c/o sorethroat.  On cyclosporine 300 mg BID (started on 12/26/21), first level on 12/29/21 -- PENDING  Today is day 5    Today: 12/30 tachy and febrile after dose 1 of ATG, EKG shows sinus tachy. Will hold dose 2. Reevaluate tomorrow.  - Febrile on 12/23/21, on cefepime and fluconazole, blood cultures negative, continue cefepime for now  - Nasal MRSA + for staph aureus (not detected though for MRSA). Will start vancomycin.   - Dental consult with x-rays, no dental source of patient's R sided gum pain. CT scan showed some sinus opacification but asymptomatic from that standpoint.  - Discussed with patient today about the possibility of going straight to allogeneic transplant, he has 30 year old sister. Have discussed transplant with patient extensively in the past.   - Afebrile now for over 24 hours. Discontinued vancomycin.   - 12/29 : Day 1 :cyclosporine as 300 mg BID (monitor level), rabit ATG, promacta  - Pt decided for rabbit ATG, s/p test dose 12/27 tolerated well. started 12/29 daily x5d.   - prednisone for serum sickness PPX 36M with Aplastic Anemia diagnosed April 2020.  Underwent treatment with equine ATG, Cyclosporine, and Promacta.  Went into remission (BM bx 5/2021 with normal morphology and cellularity).  Promacta stopped May 2021.  Cyclosporin stopped August 2021.  By September, platelets started to drop.  Now with pancytopenia and neutropenic fever.  No obvious focus of infection, however, c/o sorethroat.  On cyclosporine 300 mg BID (started on 12/26/21), first level on 12/29/21 -- PENDING  Today is day 6    Today: 12/30 tachy and febrile after dose 1 of ATG, EKG shows sinus tachy. Will hold dose 2. Reevaluate tomorrow.  - Febrile on 12/23/21, on cefepime and fluconazole, blood cultures negative, continue cefepime for now  - Nasal MRSA + for staph aureus (not detected though for MRSA). Will start vancomycin.   - Dental consult with x-rays, no dental source of patient's R sided gum pain. CT scan showed some sinus opacification but asymptomatic from that standpoint.  - Discussed with patient today about the possibility of going straight to allogeneic transplant, he has 30 year old sister. Have discussed transplant with patient extensively in the past.   - Afebrile now for over 24 hours. Discontinued vancomycin.   - 12/29 : Day 1 :cyclosporine as 300 mg BID (monitor level), rabit ATG, promacta  - Pt decided for rabbit ATG, s/p test dose 12/27 tolerated well. started 12/29 daily x5d, but Held due to infusion reaction with ATG  - prednisone for serum sickness PPX

## 2021-12-31 NOTE — PROGRESS NOTE ADULT - ASSESSMENT
36M with Aplastic Anemia diagnosed April 2020, s/p equine ATG, Cyclosporine, and Promacta. Bone marrow biopsy was done on 5/2021 revealed normal morphology and cellularity, eventually Promacta was stopped and Cyclosporin in 5/2021, in September 2021 noted his platelets dropping  now admitted with neutropenic fever found to have pancytopenia secondary to disease condition.  Bone marrow biopsy was repeated on 12/2021 showed decreased megakaryocytes; Rabbit ATG/ prednisone  started on 12/29/21. Hospital course complicated by ATG reaction, and neutropenic fever. Pt has pancytopenia secondary to chemotherapy and or disease

## 2021-12-31 NOTE — PROGRESS NOTE ADULT - PROBLEM SELECTOR PLAN 1
Relapsed Aplastic Anemia   S/p ATG, CSA, and promacta with complete remission  BM bx on 11/9/20 revealed hypocellular marrow.  BMbx 12/2021showing erythroid predominance, myeloid and erythroid maturation, and markedly decreased megakaryocytes   Pt states he has not been able to receive Promacta outpatient x~10 days. Sent script to [facility] on 12/22 12/23  transferred  to [facility] for ATG treatment, plan on 12/24.  Monitor CBC with diff/lytes, transfuse and or replete PRN  Anemia PRBC x 1 unit   Thrombocytopenia Platelet x 1 unit   Hypomagnesemia- Magnesium sulphate 1 g IV x 1   Monitor weight, I and O, mouth care  12/26- Start Cyclosporine 300 mg PO Q 12 hrs   12/27 test dose - no reaction noted  12/28 PICC placement  12/29 Started  Rabbit ATG/prednisone/;  12/29 SCA level 197, dose increased to 325 PO BID, follow up CSA level on Saturday 1/1/22 12/29 Reaction to ATG- Benadryl, Hydrocortisone given   HELD on 12/30 due to reaction ; Continue promacta  12/31 will reassess in am

## 2022-01-01 LAB
ALBUMIN SERPL ELPH-MCNC: 3.4 G/DL — SIGNIFICANT CHANGE UP (ref 3.3–5)
ALP SERPL-CCNC: 56 U/L — SIGNIFICANT CHANGE UP (ref 40–120)
ALT FLD-CCNC: 26 U/L — SIGNIFICANT CHANGE UP (ref 10–45)
ANION GAP SERPL CALC-SCNC: 12 MMOL/L — SIGNIFICANT CHANGE UP (ref 5–17)
APTT BLD: 28.2 SEC — SIGNIFICANT CHANGE UP (ref 27.5–35.5)
AST SERPL-CCNC: 12 U/L — SIGNIFICANT CHANGE UP (ref 10–40)
BASOPHILS # BLD AUTO: 0 K/UL — SIGNIFICANT CHANGE UP (ref 0–0.2)
BASOPHILS NFR BLD AUTO: 0 % — SIGNIFICANT CHANGE UP (ref 0–2)
BILIRUB SERPL-MCNC: 0.8 MG/DL — SIGNIFICANT CHANGE UP (ref 0.2–1.2)
BUN SERPL-MCNC: 16 MG/DL — SIGNIFICANT CHANGE UP (ref 7–23)
CALCIUM SERPL-MCNC: 9.1 MG/DL — SIGNIFICANT CHANGE UP (ref 8.4–10.5)
CHLORIDE SERPL-SCNC: 105 MMOL/L — SIGNIFICANT CHANGE UP (ref 96–108)
CO2 SERPL-SCNC: 20 MMOL/L — LOW (ref 22–31)
CREAT SERPL-MCNC: 0.93 MG/DL — SIGNIFICANT CHANGE UP (ref 0.5–1.3)
CYCLOSPORINE SER-MCNC: 230 NG/ML — SIGNIFICANT CHANGE UP (ref 150–400)
EOSINOPHIL # BLD AUTO: 0 K/UL — SIGNIFICANT CHANGE UP (ref 0–0.5)
EOSINOPHIL NFR BLD AUTO: 0 % — SIGNIFICANT CHANGE UP (ref 0–6)
FIBRINOGEN PPP-MCNC: 926 MG/DL — HIGH (ref 290–520)
GLUCOSE SERPL-MCNC: 95 MG/DL — SIGNIFICANT CHANGE UP (ref 70–99)
HCT VFR BLD CALC: 19.3 % — CRITICAL LOW (ref 39–50)
HGB BLD-MCNC: 7.1 G/DL — LOW (ref 13–17)
INR BLD: 1.22 RATIO — HIGH (ref 0.88–1.16)
LDH SERPL L TO P-CCNC: 198 U/L — SIGNIFICANT CHANGE UP (ref 50–242)
LYMPHOCYTES # BLD AUTO: 0.33 K/UL — LOW (ref 1–3.3)
LYMPHOCYTES # BLD AUTO: 61.5 % — HIGH (ref 13–44)
MAGNESIUM SERPL-MCNC: 1.8 MG/DL — SIGNIFICANT CHANGE UP (ref 1.6–2.6)
MANUAL SMEAR VERIFICATION: SIGNIFICANT CHANGE UP
MCHC RBC-ENTMCNC: 30.9 PG — SIGNIFICANT CHANGE UP (ref 27–34)
MCHC RBC-ENTMCNC: 36.8 GM/DL — HIGH (ref 32–36)
MCV RBC AUTO: 83.9 FL — SIGNIFICANT CHANGE UP (ref 80–100)
MONOCYTES # BLD AUTO: 0.02 K/UL — SIGNIFICANT CHANGE UP (ref 0–0.9)
MONOCYTES NFR BLD AUTO: 3.9 % — SIGNIFICANT CHANGE UP (ref 2–14)
NEUTROPHILS # BLD AUTO: 0.19 K/UL — LOW (ref 1.8–7.4)
NEUTROPHILS NFR BLD AUTO: 34.6 % — LOW (ref 43–77)
NRBC # BLD: 4 /100 — HIGH (ref 0–0)
PHOSPHATE SERPL-MCNC: 2.6 MG/DL — SIGNIFICANT CHANGE UP (ref 2.5–4.5)
PLAT MORPH BLD: NORMAL — SIGNIFICANT CHANGE UP
PLATELET # BLD AUTO: 20 K/UL — CRITICAL LOW (ref 150–400)
POTASSIUM SERPL-MCNC: 3.9 MMOL/L — SIGNIFICANT CHANGE UP (ref 3.5–5.3)
POTASSIUM SERPL-SCNC: 3.9 MMOL/L — SIGNIFICANT CHANGE UP (ref 3.5–5.3)
PROT SERPL-MCNC: 6.3 G/DL — SIGNIFICANT CHANGE UP (ref 6–8.3)
PROTHROM AB SERPL-ACNC: 14.5 SEC — HIGH (ref 10.6–13.6)
RBC # BLD: 2.3 M/UL — LOW (ref 4.2–5.8)
RBC # FLD: 12.7 % — SIGNIFICANT CHANGE UP (ref 10.3–14.5)
RBC BLD AUTO: SIGNIFICANT CHANGE UP
SODIUM SERPL-SCNC: 137 MMOL/L — SIGNIFICANT CHANGE UP (ref 135–145)
URATE SERPL-MCNC: 3.4 MG/DL — SIGNIFICANT CHANGE UP (ref 3.4–8.8)
WBC # BLD: 0.54 K/UL — CRITICAL LOW (ref 3.8–10.5)
WBC # FLD AUTO: 0.54 K/UL — CRITICAL LOW (ref 3.8–10.5)

## 2022-01-01 PROCEDURE — 99233 SBSQ HOSP IP/OBS HIGH 50: CPT

## 2022-01-01 RX ADMIN — Medication 650 MILLIGRAM(S): at 00:40

## 2022-01-01 RX ADMIN — Medication 400 MILLIGRAM(S): at 13:26

## 2022-01-01 RX ADMIN — Medication 650 MILLIGRAM(S): at 15:10

## 2022-01-01 RX ADMIN — Medication 15 MILLILITER(S): at 11:37

## 2022-01-01 RX ADMIN — Medication 15 MILLILITER(S): at 17:37

## 2022-01-01 RX ADMIN — Medication 400 MILLIGRAM(S): at 21:11

## 2022-01-01 RX ADMIN — CEFEPIME 100 MILLIGRAM(S): 1 INJECTION, POWDER, FOR SOLUTION INTRAMUSCULAR; INTRAVENOUS at 05:42

## 2022-01-01 RX ADMIN — CEFEPIME 100 MILLIGRAM(S): 1 INJECTION, POWDER, FOR SOLUTION INTRAMUSCULAR; INTRAVENOUS at 21:12

## 2022-01-01 RX ADMIN — CHLORHEXIDINE GLUCONATE 1 APPLICATION(S): 213 SOLUTION TOPICAL at 10:30

## 2022-01-01 RX ADMIN — Medication 15 MILLILITER(S): at 00:29

## 2022-01-01 RX ADMIN — CYCLOSPORINE 325 MILLIGRAM(S): 100 CAPSULE ORAL at 05:41

## 2022-01-01 RX ADMIN — Medication 1 SPRAY(S): at 05:45

## 2022-01-01 RX ADMIN — Medication 650 MILLIGRAM(S): at 00:10

## 2022-01-01 RX ADMIN — ELTROMBOPAG OLAMINE 150 MILLIGRAM(S): 50 TABLET, FILM COATED ORAL at 14:47

## 2022-01-01 RX ADMIN — Medication 15 MILLILITER(S): at 05:41

## 2022-01-01 RX ADMIN — FAMOTIDINE 20 MILLIGRAM(S): 10 INJECTION INTRAVENOUS at 13:29

## 2022-01-01 RX ADMIN — Medication 650 MILLIGRAM(S): at 14:49

## 2022-01-01 RX ADMIN — Medication 1 SPRAY(S): at 17:37

## 2022-01-01 RX ADMIN — FLUCONAZOLE 200 MILLIGRAM(S): 150 TABLET ORAL at 11:37

## 2022-01-01 RX ADMIN — FAMOTIDINE 20 MILLIGRAM(S): 10 INJECTION INTRAVENOUS at 23:08

## 2022-01-01 RX ADMIN — CYCLOSPORINE 325 MILLIGRAM(S): 100 CAPSULE ORAL at 17:35

## 2022-01-01 RX ADMIN — FAMOTIDINE 20 MILLIGRAM(S): 10 INJECTION INTRAVENOUS at 00:11

## 2022-01-01 RX ADMIN — PANTOPRAZOLE SODIUM 40 MILLIGRAM(S): 20 TABLET, DELAYED RELEASE ORAL at 05:41

## 2022-01-01 RX ADMIN — CEFEPIME 100 MILLIGRAM(S): 1 INJECTION, POWDER, FOR SOLUTION INTRAMUSCULAR; INTRAVENOUS at 13:29

## 2022-01-01 RX ADMIN — SODIUM CHLORIDE 50 MILLILITER(S): 9 INJECTION INTRAMUSCULAR; INTRAVENOUS; SUBCUTANEOUS at 21:13

## 2022-01-01 RX ADMIN — Medication 1 SPRAY(S): at 11:40

## 2022-01-01 RX ADMIN — Medication 400 MILLIGRAM(S): at 05:41

## 2022-01-01 RX ADMIN — SODIUM CHLORIDE 50 MILLILITER(S): 9 INJECTION INTRAMUSCULAR; INTRAVENOUS; SUBCUTANEOUS at 17:39

## 2022-01-01 NOTE — PROGRESS NOTE ADULT - ATTENDING COMMENTS
36M with Aplastic Anemia diagnosed April 2020.  Underwent treatment with equine ATG, Cyclosporine, and Promacta.  Went into remission (BM bx 5/2021 with normal morphology and cellularity).  Promacta stopped May 2021.  Cyclosporin stopped August 2021.  By September, platelets started to drop.  Now with pancytopenia and neutropenic fever.  No obvious focus of infection, however, c/o sorethroat.  On cyclosporine 300 mg BID (started on 12/26/21), first level on 12/29/21 -- PENDING  Today is day 6    Today: 12/30 tachy and febrile after dose 1 of ATG, EKG shows sinus tachy. Will hold dose 2. Reevaluate tomorrow.  - Febrile on 12/23/21, on cefepime and fluconazole, blood cultures negative, continue cefepime for now  - Nasal MRSA + for staph aureus (not detected though for MRSA). Will start vancomycin.   - Dental consult with x-rays, no dental source of patient's R sided gum pain. CT scan showed some sinus opacification but asymptomatic from that standpoint.  - Discussed with patient today about the possibility of going straight to allogeneic transplant, he has 30 year old sister. Have discussed transplant with patient extensively in the past.   - Afebrile now for over 24 hours. Discontinued vancomycin.   - 12/29 : Day 1 :cyclosporine as 300 mg BID (monitor level), rabit ATG, promacta  - Pt decided for rabbit ATG, s/p test dose 12/27 tolerated well. started 12/29 daily x5d, but Held due to infusion reaction with ATG  - prednisone for serum sickness PPX 36M with Aplastic Anemia diagnosed April 2020.  Underwent treatment with equine ATG, Cyclosporine, and Promacta.  Went into remission (BM bx 5/2021 with normal morphology and cellularity).  Promacta stopped May 2021.  Cyclosporin stopped August 2021.  By September, platelets started to drop.  Now with pancytopenia and neutropenic fever.  No obvious focus of infection, however, c/o sorethroat.  On cyclosporine 300 mg BID (started on 12/26/21), first level on 12/29/21 -- PENDING  Today is day 7    Today: 12/30 tachy and febrile after dose 1 of ATG, EKG shows sinus tachy. Will hold dose 2. Reevaluate tomorrow.  - Febrile on 12/23/21, on cefepime and fluconazole, blood cultures negative, continue cefepime for now  - Nasal MRSA + for staph aureus (not detected though for MRSA). Will start vancomycin.   - Dental consult with x-rays, no dental source of patient's R sided gum pain. CT scan showed some sinus opacification but asymptomatic from that standpoint.  - Discussed with patient today about the possibility of going straight to allogeneic transplant, he has 30 year old sister. Have discussed transplant with patient extensively in the past.   - Afebrile now for over 24 hours. Discontinued vancomycin.   - 12/29 : Day 1 :cyclosporine as 300 mg BID (monitor level), rabit ATG, promacta  - Pt decided for rabbit ATG, s/p test dose 12/27 tolerated well. started 12/29 daily x5d, but Held due to infusion reaction with ATG  - prednisone for serum sickness PPX

## 2022-01-01 NOTE — PROGRESS NOTE ADULT - SUBJECTIVE AND OBJECTIVE BOX
Diagnosis: Relapsed aplastic anemia    Protocol/Chemo Regimen:  Rabbit ATG/prednisone on HOLD due to reaction  /continue Promacta     Day: 2 ATG (on hold)      Pt endorsed:    Review of Systems:    Pain scale:                                        Location:    Diet:     Allergies: No Known Allergies    ANTIMICROBIALS  acyclovir   Oral Tab/Cap 400 milliGRAM(s) Oral every 8 hours  cefepime   IVPB 2000 milliGRAM(s) IV Intermittent every 8 hours  fluconAZOLE   Tablet 200 milliGRAM(s) Oral daily      HEME/ONC MEDICATIONS  eltrombopag 150 milliGRAM(s) Oral daily      STANDING MEDICATIONS  acetaminophen     Tablet .. 650 milliGRAM(s) Oral daily  Biotene Dry Mouth Oral Rinse 15 milliLiter(s) Swish and Spit four times a day  chlorhexidine 2% Cloths 1 Application(s) Topical <User Schedule>  cycloSPORINE  (SandIMMUNE) 325 milliGRAM(s) Oral every 12 hours  diphenhydrAMINE Injectable 50 milliGRAM(s) IV Push daily  famotidine Injectable 20 milliGRAM(s) IV Push every 12 hours  FIRST- Mouthwash  BLM 10 milliLiter(s) Swish and Spit four times a day  lidocaine 2% Viscous 2 milliLiter(s) Oral four times a day  methylPREDNISolone sodium succinate IVPB 500 milliGRAM(s) IV Intermittent once  pantoprazole    Tablet 40 milliGRAM(s) Oral before breakfast  sodium chloride 0.65% Nasal 1 Spray(s) Both Nostrils four times a day  sodium chloride 0.9%. 1000 milliLiter(s) IV Continuous <Continuous>      PRN MEDICATIONS  acetaminophen     Tablet .. 650 milliGRAM(s) Oral every 6 hours PRN  acetaminophen   IVPB .. 1000 milliGRAM(s) IV Intermittent once PRN  aluminum hydroxide/magnesium hydroxide/simethicone Suspension 30 milliLiter(s) Oral every 4 hours PRN  calcium carbonate    500 mG (Tums) Chewable 1 Tablet(s) Chew every 4 hours PRN  diphenhydrAMINE 25 milliGRAM(s) Oral every 12 hours PRN  diphenhydrAMINE Injectable 25 milliGRAM(s) IV Push once PRN  EPINEPHrine     1 mG/mL Injectable 0.3 milliGRAM(s) IntraMuscular once PRN  hydrocortisone sodium succinate Injectable 50 milliGRAM(s) IV Push every 12 hours PRN  metoclopramide Injectable 10 milliGRAM(s) IV Push every 6 hours PRN  ondansetron Injectable 8 milliGRAM(s) IV Push every 8 hours PRN      Vital Signs Last 24 Hrs  T(C): 36.3 (01 Jan 2022 05:31), Max: 37.5 (31 Dec 2021 17:20)  T(F): 97.4 (01 Jan 2022 05:31), Max: 99.5 (31 Dec 2021 17:20)  HR: 97 (01 Jan 2022 05:31) (79 - 111)  BP: 108/62 (01 Jan 2022 05:31) (108/62 - 139/94)  RR: 18 (01 Jan 2022 05:31) (18 - 18)  SpO2: 95% (01 Jan 2022 05:31) (95% - 99%)    PHYSICAL EXAM  General: adult in NAD  HEENT: clear oropharynx  Neck: supple  CV: normal S1/S2 RRR  Lungs: positive air movement b/l ant lungs,clear to auscultation, no wheezes, no rales  Abdomen: soft non-tender non-distended  Ext: no edema  Skin: no rashes and no petechiae  Neuro: alert and oriented X 4, no focal deficits  Central Line: picc c/d/i    Cultures:  Culture - Urine (12.30.21 @ 00:57)    Specimen Source: Clean Catch Clean Catch (Midstream)    Culture Results:   No growth    Culture - Blood (12.29.21 @ 21:56)    Specimen Source: .Blood Blood-Peripheral    Culture Results:   No growth to date.    Culture - Blood (12.29.21 @ 21:56)    Specimen Source: .Blood Blood-Catheter    Culture Results:   No growth to date.    LABS:                                     RADIOLOGY & ADDITIONAL STUDIES:         Diagnosis: Relapsed aplastic anemia    Protocol/Chemo Regimen:  Rabbit ATG/prednisone on HOLD due to reaction  /continue Promacta     Day: 2 ATG (on hold)      Pt endorsed: still with mild headache; a bit depressed    Review of Systems: all other ROS negative    Pain scale:  2/10      Location: head    Diet: regular    Allergies: No Known Allergies    ANTIMICROBIALS  acyclovir   Oral Tab/Cap 400 milliGRAM(s) Oral every 8 hours  cefepime   IVPB 2000 milliGRAM(s) IV Intermittent every 8 hours  fluconAZOLE   Tablet 200 milliGRAM(s) Oral daily      HEME/ONC MEDICATIONS  eltrombopag 150 milliGRAM(s) Oral daily      STANDING MEDICATIONS  acetaminophen     Tablet .. 650 milliGRAM(s) Oral daily  Biotene Dry Mouth Oral Rinse 15 milliLiter(s) Swish and Spit four times a day  chlorhexidine 2% Cloths 1 Application(s) Topical <User Schedule>  cycloSPORINE  (SandIMMUNE) 325 milliGRAM(s) Oral every 12 hours  diphenhydrAMINE Injectable 50 milliGRAM(s) IV Push daily  famotidine Injectable 20 milliGRAM(s) IV Push every 12 hours  FIRST- Mouthwash  BLM 10 milliLiter(s) Swish and Spit four times a day  lidocaine 2% Viscous 2 milliLiter(s) Oral four times a day  methylPREDNISolone sodium succinate IVPB 500 milliGRAM(s) IV Intermittent once  pantoprazole    Tablet 40 milliGRAM(s) Oral before breakfast  sodium chloride 0.65% Nasal 1 Spray(s) Both Nostrils four times a day  sodium chloride 0.9%. 1000 milliLiter(s) IV Continuous <Continuous>      PRN MEDICATIONS  acetaminophen     Tablet .. 650 milliGRAM(s) Oral every 6 hours PRN  acetaminophen   IVPB .. 1000 milliGRAM(s) IV Intermittent once PRN  aluminum hydroxide/magnesium hydroxide/simethicone Suspension 30 milliLiter(s) Oral every 4 hours PRN  calcium carbonate    500 mG (Tums) Chewable 1 Tablet(s) Chew every 4 hours PRN  diphenhydrAMINE 25 milliGRAM(s) Oral every 12 hours PRN  diphenhydrAMINE Injectable 25 milliGRAM(s) IV Push once PRN  EPINEPHrine     1 mG/mL Injectable 0.3 milliGRAM(s) IntraMuscular once PRN  hydrocortisone sodium succinate Injectable 50 milliGRAM(s) IV Push every 12 hours PRN  metoclopramide Injectable 10 milliGRAM(s) IV Push every 6 hours PRN  ondansetron Injectable 8 milliGRAM(s) IV Push every 8 hours PRN      Vital Signs Last 24 Hrs  T(C): 36.3 (01 Jan 2022 05:31), Max: 37.5 (31 Dec 2021 17:20)  T(F): 97.4 (01 Jan 2022 05:31), Max: 99.5 (31 Dec 2021 17:20)  HR: 97 (01 Jan 2022 05:31) (79 - 111)  BP: 108/62 (01 Jan 2022 05:31) (108/62 - 139/94)  RR: 18 (01 Jan 2022 05:31) (18 - 18)  SpO2: 95% (01 Jan 2022 05:31) (95% - 99%)    PHYSICAL EXAM  General: adult in NAD  HEENT: clear oropharynx  Neck: supple  CV: normal S1/S2 RRR  Lungs: positive air movement b/l ant lungs,clear to auscultation, no wheezes, no rales  Abdomen: soft non-tender non-distended  Ext: no edema  Skin: no rashes and no petechiae  Neuro: alert and oriented X 4, no focal deficits  Central Line: picc c/d/i    Cultures:  Culture - Urine (12.30.21 @ 00:57)    Specimen Source: Clean Catch Clean Catch (Midstream)    Culture Results:   No growth    Culture - Blood (12.29.21 @ 21:56)    Specimen Source: .Blood Blood-Peripheral    Culture Results:   No growth to date.    Culture - Blood (12.29.21 @ 21:56)    Specimen Source: .Blood Blood-Catheter    Culture Results:   No growth to date.    LABS:                               7.1    0.54  )-----------( 20       ( 01 Jan 2022 07:17 )             19.3     01 Jan 2022 07:23    137    |  105    |  16     ----------------------------<  95     3.9     |  20     |  0.93     Ca    9.1        01 Jan 2022 07:23  Phos  2.6       01 Jan 2022 07:23  Mg     1.8       01 Jan 2022 07:23    TPro  6.3    /  Alb  3.4    /  TBili  0.8    /  DBili  x      /  AST  12     /  ALT  26     /  AlkPhos  56     01 Jan 2022 07:23    PT/INR - ( 01 Jan 2022 07:26 )   PT: 14.5 sec;   INR: 1.22 ratio    PTT - ( 01 Jan 2022 07:26 )  PTT:28.2 sec    LIVER FUNCTIONS - ( 01 Jan 2022 07:23 )  Alb: 3.4 g/dL / Pro: 6.3 g/dL / ALK PHOS: 56 U/L / ALT: 26 U/L / AST: 12 U/L / GGT: x           RADIOLOGY & ADDITIONAL STUDIES:  from: Xray Chest 1 View- PORTABLE-Urgent (Xray Chest 1 View- PORTABLE-Urgent .) (12.30.21 @ 11:22)   FINDINGS:  Clear lungs. No pleural effusion or pneumothorax.  The heart is normal in size. Right-sided central venous catheter   unchanged prior exam.  No acute osseous findings.    IMPRESSION:  Clear lungs.         Diagnosis: Relapsed aplastic anemia    Protocol/Chemo Regimen:  Rabbit ATG/prednisone on HOLD due to reaction  /continue Promacta     Day: 2 ATG (on hold)      Pt endorsed: still with mild headache; a bit depressed    Review of Systems: all other ROS negative    Pain scale:  2/10      Location: head    Diet: regular    Allergies: No Known Allergies    ANTIMICROBIALS  acyclovir   Oral Tab/Cap 400 milliGRAM(s) Oral every 8 hours  cefepime   IVPB 2000 milliGRAM(s) IV Intermittent every 8 hours  fluconAZOLE   Tablet 200 milliGRAM(s) Oral daily      HEME/ONC MEDICATIONS  eltrombopag 150 milliGRAM(s) Oral daily      STANDING MEDICATIONS  acetaminophen     Tablet .. 650 milliGRAM(s) Oral daily  Biotene Dry Mouth Oral Rinse 15 milliLiter(s) Swish and Spit four times a day  chlorhexidine 2% Cloths 1 Application(s) Topical <User Schedule>  cycloSPORINE  (SandIMMUNE) 325 milliGRAM(s) Oral every 12 hours  diphenhydrAMINE Injectable 50 milliGRAM(s) IV Push daily  famotidine Injectable 20 milliGRAM(s) IV Push every 12 hours  FIRST- Mouthwash  BLM 10 milliLiter(s) Swish and Spit four times a day  lidocaine 2% Viscous 2 milliLiter(s) Oral four times a day  methylPREDNISolone sodium succinate IVPB 500 milliGRAM(s) IV Intermittent once  pantoprazole    Tablet 40 milliGRAM(s) Oral before breakfast  sodium chloride 0.65% Nasal 1 Spray(s) Both Nostrils four times a day  sodium chloride 0.9%. 1000 milliLiter(s) IV Continuous <Continuous>      PRN MEDICATIONS  acetaminophen     Tablet .. 650 milliGRAM(s) Oral every 6 hours PRN  acetaminophen   IVPB .. 1000 milliGRAM(s) IV Intermittent once PRN  aluminum hydroxide/magnesium hydroxide/simethicone Suspension 30 milliLiter(s) Oral every 4 hours PRN  calcium carbonate    500 mG (Tums) Chewable 1 Tablet(s) Chew every 4 hours PRN  diphenhydrAMINE 25 milliGRAM(s) Oral every 12 hours PRN  diphenhydrAMINE Injectable 25 milliGRAM(s) IV Push once PRN  EPINEPHrine     1 mG/mL Injectable 0.3 milliGRAM(s) IntraMuscular once PRN  hydrocortisone sodium succinate Injectable 50 milliGRAM(s) IV Push every 12 hours PRN  metoclopramide Injectable 10 milliGRAM(s) IV Push every 6 hours PRN  ondansetron Injectable 8 milliGRAM(s) IV Push every 8 hours PRN      Vital Signs Last 24 Hrs  T(C): 36.3 (01 Jan 2022 05:31), Max: 37.5 (31 Dec 2021 17:20)  T(F): 97.4 (01 Jan 2022 05:31), Max: 99.5 (31 Dec 2021 17:20)  HR: 97 (01 Jan 2022 05:31) (79 - 111)  BP: 108/62 (01 Jan 2022 05:31) (108/62 - 139/94)  RR: 18 (01 Jan 2022 05:31) (18 - 18)  SpO2: 95% (01 Jan 2022 05:31) (95% - 99%)    PHYSICAL EXAM  General: adult in NAD  HEENT: clear oropharynx  Neck: supple  CV: normal S1/S2 RRR  Lungs: clear to auscultation, no wheezes, no rales  Abdomen: soft non-tender non-distended  Ext: no edema  Skin: no rash  Neuro: alert and oriented X 4  Central Line: picc c/d/i    Cultures:  Culture - Urine (12.30.21 @ 00:57)    Specimen Source: Clean Catch Clean Catch (Midstream)    Culture Results:   No growth    Culture - Blood (12.29.21 @ 21:56)    Specimen Source: .Blood Blood-Peripheral    Culture Results:   No growth to date.    Culture - Blood (12.29.21 @ 21:56)    Specimen Source: .Blood Blood-Catheter    Culture Results:   No growth to date.    LABS:                               7.1    0.54  )-----------( 20       ( 01 Jan 2022 07:17 )             19.3     01 Jan 2022 07:23    137    |  105    |  16     ----------------------------<  95     3.9     |  20     |  0.93     Ca    9.1        01 Jan 2022 07:23  Phos  2.6       01 Jan 2022 07:23  Mg     1.8       01 Jan 2022 07:23    TPro  6.3    /  Alb  3.4    /  TBili  0.8    /  DBili  x      /  AST  12     /  ALT  26     /  AlkPhos  56     01 Jan 2022 07:23    PT/INR - ( 01 Jan 2022 07:26 )   PT: 14.5 sec;   INR: 1.22 ratio    PTT - ( 01 Jan 2022 07:26 )  PTT:28.2 sec    LIVER FUNCTIONS - ( 01 Jan 2022 07:23 )  Alb: 3.4 g/dL / Pro: 6.3 g/dL / ALK PHOS: 56 U/L / ALT: 26 U/L / AST: 12 U/L / GGT: x           RADIOLOGY & ADDITIONAL STUDIES:  from: Xray Chest 1 View- PORTABLE-Urgent (Xray Chest 1 View- PORTABLE-Urgent .) (12.30.21 @ 11:22)   FINDINGS:  Clear lungs. No pleural effusion or pneumothorax.  The heart is normal in size. Right-sided central venous catheter   unchanged prior exam.  No acute osseous findings.    IMPRESSION:  Clear lungs.

## 2022-01-01 NOTE — PROGRESS NOTE ADULT - TIME BILLING
The time was used discussed with patient, family, primary team, consultants, and acute management. The time was used in discussion with patient, primary team, consultants, and acute management.

## 2022-01-01 NOTE — PROGRESS NOTE ADULT - PROBLEM SELECTOR PLAN 1
Relapsed Aplastic Anemia   S/p ATG, CSA, and promacta with complete remission  BM bx on 11/9/20 revealed hypocellular marrow.  BMbx 12/2021showing erythroid predominance, myeloid and erythroid maturation, and markedly decreased megakaryocytes   Pt states he has not been able to receive Promacta outpatient x~10 days. Sent script to [facility] at [facility] on 12/22 12/23  transferred  to [facility] for ATG treatment, plan on 12/24.  Monitor CBC with diff/lytes, transfuse and or replete PRN  Anemia PRBC x 1 unit   Thrombocytopenia Platelet x 1 unit   Hypomagnesemia- Magnesium sulphate 1 g IV x 1   Monitor weight, I and O, mouth care  12/26- Start Cyclosporine 300 mg PO Q 12 hrs   12/27 test dose - no reaction noted  12/28 PICC placement  12/29 Started  Rabbit ATG/prednisone/;  12/29 SCA level 197, dose increased to 325 PO BID, follow up CSA level on Saturday 1/1/22 12/29 Reaction to ATG- Benadryl, Hydrocortisone given   HELD on 12/30 due to reaction ; Continue promacta  12/31 will reassess in am

## 2022-01-02 ENCOUNTER — APPOINTMENT (OUTPATIENT)
Dept: INFUSION THERAPY | Facility: HOSPITAL | Age: 37
End: 2022-01-02

## 2022-01-02 LAB
ALBUMIN SERPL ELPH-MCNC: 3.6 G/DL — SIGNIFICANT CHANGE UP (ref 3.3–5)
ALP SERPL-CCNC: 62 U/L — SIGNIFICANT CHANGE UP (ref 40–120)
ALT FLD-CCNC: 33 U/L — SIGNIFICANT CHANGE UP (ref 10–45)
ANION GAP SERPL CALC-SCNC: 14 MMOL/L — SIGNIFICANT CHANGE UP (ref 5–17)
APTT BLD: 29.1 SEC — SIGNIFICANT CHANGE UP (ref 27.5–35.5)
AST SERPL-CCNC: 15 U/L — SIGNIFICANT CHANGE UP (ref 10–40)
BASOPHILS # BLD AUTO: 0 K/UL — SIGNIFICANT CHANGE UP (ref 0–0.2)
BASOPHILS NFR BLD AUTO: 0 % — SIGNIFICANT CHANGE UP (ref 0–2)
BILIRUB SERPL-MCNC: 0.9 MG/DL — SIGNIFICANT CHANGE UP (ref 0.2–1.2)
BUN SERPL-MCNC: 16 MG/DL — SIGNIFICANT CHANGE UP (ref 7–23)
CALCIUM SERPL-MCNC: 8.7 MG/DL — SIGNIFICANT CHANGE UP (ref 8.4–10.5)
CHLORIDE SERPL-SCNC: 105 MMOL/L — SIGNIFICANT CHANGE UP (ref 96–108)
CO2 SERPL-SCNC: 18 MMOL/L — LOW (ref 22–31)
CREAT SERPL-MCNC: 0.88 MG/DL — SIGNIFICANT CHANGE UP (ref 0.5–1.3)
EOSINOPHIL # BLD AUTO: 0.04 K/UL — SIGNIFICANT CHANGE UP (ref 0–0.5)
EOSINOPHIL NFR BLD AUTO: 7.7 % — HIGH (ref 0–6)
FIBRINOGEN PPP-MCNC: 912 MG/DL — HIGH (ref 290–520)
GLUCOSE SERPL-MCNC: 93 MG/DL — SIGNIFICANT CHANGE UP (ref 70–99)
HCT VFR BLD CALC: 20.9 % — CRITICAL LOW (ref 39–50)
HGB BLD-MCNC: 7.6 G/DL — LOW (ref 13–17)
INR BLD: 1.14 RATIO — SIGNIFICANT CHANGE UP (ref 0.88–1.16)
LDH SERPL L TO P-CCNC: 225 U/L — SIGNIFICANT CHANGE UP (ref 50–242)
LYMPHOCYTES # BLD AUTO: 0.38 K/UL — LOW (ref 1–3.3)
LYMPHOCYTES # BLD AUTO: 66.7 % — HIGH (ref 13–44)
MAGNESIUM SERPL-MCNC: 1.7 MG/DL — SIGNIFICANT CHANGE UP (ref 1.6–2.6)
MANUAL SMEAR VERIFICATION: SIGNIFICANT CHANGE UP
MCHC RBC-ENTMCNC: 30.5 PG — SIGNIFICANT CHANGE UP (ref 27–34)
MCHC RBC-ENTMCNC: 36.4 GM/DL — HIGH (ref 32–36)
MCV RBC AUTO: 83.9 FL — SIGNIFICANT CHANGE UP (ref 80–100)
MONOCYTES # BLD AUTO: 0.03 K/UL — SIGNIFICANT CHANGE UP (ref 0–0.9)
MONOCYTES NFR BLD AUTO: 5.1 % — SIGNIFICANT CHANGE UP (ref 2–14)
NEUTROPHILS # BLD AUTO: 0.12 K/UL — LOW (ref 1.8–7.4)
NEUTROPHILS NFR BLD AUTO: 17.9 % — LOW (ref 43–77)
NEUTS BAND # BLD: 2.6 % — SIGNIFICANT CHANGE UP (ref 0–8)
NRBC # BLD: 3 /100 — HIGH (ref 0–0)
PHOSPHATE SERPL-MCNC: 3.1 MG/DL — SIGNIFICANT CHANGE UP (ref 2.5–4.5)
PLAT MORPH BLD: NORMAL — SIGNIFICANT CHANGE UP
PLATELET # BLD AUTO: 15 K/UL — CRITICAL LOW (ref 150–400)
POTASSIUM SERPL-MCNC: 4 MMOL/L — SIGNIFICANT CHANGE UP (ref 3.5–5.3)
POTASSIUM SERPL-SCNC: 4 MMOL/L — SIGNIFICANT CHANGE UP (ref 3.5–5.3)
PROT SERPL-MCNC: 6.6 G/DL — SIGNIFICANT CHANGE UP (ref 6–8.3)
PROTHROM AB SERPL-ACNC: 13.6 SEC — SIGNIFICANT CHANGE UP (ref 10.6–13.6)
RBC # BLD: 2.49 M/UL — LOW (ref 4.2–5.8)
RBC # FLD: 12.8 % — SIGNIFICANT CHANGE UP (ref 10.3–14.5)
RBC BLD AUTO: SIGNIFICANT CHANGE UP
SODIUM SERPL-SCNC: 137 MMOL/L — SIGNIFICANT CHANGE UP (ref 135–145)
URATE SERPL-MCNC: 3.3 MG/DL — LOW (ref 3.4–8.8)
WBC # BLD: 0.57 K/UL — CRITICAL LOW (ref 3.8–10.5)
WBC # FLD AUTO: 0.57 K/UL — CRITICAL LOW (ref 3.8–10.5)

## 2022-01-02 PROCEDURE — 99233 SBSQ HOSP IP/OBS HIGH 50: CPT

## 2022-01-02 RX ADMIN — FAMOTIDINE 20 MILLIGRAM(S): 10 INJECTION INTRAVENOUS at 11:42

## 2022-01-02 RX ADMIN — SODIUM CHLORIDE 50 MILLILITER(S): 9 INJECTION INTRAMUSCULAR; INTRAVENOUS; SUBCUTANEOUS at 21:18

## 2022-01-02 RX ADMIN — Medication 1 SPRAY(S): at 23:44

## 2022-01-02 RX ADMIN — SODIUM CHLORIDE 50 MILLILITER(S): 9 INJECTION INTRAMUSCULAR; INTRAVENOUS; SUBCUTANEOUS at 17:57

## 2022-01-02 RX ADMIN — FLUCONAZOLE 200 MILLIGRAM(S): 150 TABLET ORAL at 14:05

## 2022-01-02 RX ADMIN — FAMOTIDINE 20 MILLIGRAM(S): 10 INJECTION INTRAVENOUS at 23:43

## 2022-01-02 RX ADMIN — Medication 15 MILLILITER(S): at 05:07

## 2022-01-02 RX ADMIN — CEFEPIME 100 MILLIGRAM(S): 1 INJECTION, POWDER, FOR SOLUTION INTRAMUSCULAR; INTRAVENOUS at 05:08

## 2022-01-02 RX ADMIN — Medication 15 MILLILITER(S): at 11:42

## 2022-01-02 RX ADMIN — CHLORHEXIDINE GLUCONATE 1 APPLICATION(S): 213 SOLUTION TOPICAL at 08:19

## 2022-01-02 RX ADMIN — CEFEPIME 100 MILLIGRAM(S): 1 INJECTION, POWDER, FOR SOLUTION INTRAMUSCULAR; INTRAVENOUS at 21:17

## 2022-01-02 RX ADMIN — CEFEPIME 100 MILLIGRAM(S): 1 INJECTION, POWDER, FOR SOLUTION INTRAMUSCULAR; INTRAVENOUS at 14:05

## 2022-01-02 RX ADMIN — Medication 400 MILLIGRAM(S): at 14:09

## 2022-01-02 RX ADMIN — PANTOPRAZOLE SODIUM 40 MILLIGRAM(S): 20 TABLET, DELAYED RELEASE ORAL at 05:05

## 2022-01-02 RX ADMIN — ELTROMBOPAG OLAMINE 150 MILLIGRAM(S): 50 TABLET, FILM COATED ORAL at 11:42

## 2022-01-02 RX ADMIN — Medication 15 MILLILITER(S): at 17:44

## 2022-01-02 RX ADMIN — Medication 1 SPRAY(S): at 05:30

## 2022-01-02 RX ADMIN — Medication 1 SPRAY(S): at 00:07

## 2022-01-02 RX ADMIN — CYCLOSPORINE 325 MILLIGRAM(S): 100 CAPSULE ORAL at 05:05

## 2022-01-02 RX ADMIN — Medication 400 MILLIGRAM(S): at 21:17

## 2022-01-02 RX ADMIN — Medication 650 MILLIGRAM(S): at 23:49

## 2022-01-02 RX ADMIN — Medication 15 MILLILITER(S): at 00:07

## 2022-01-02 RX ADMIN — Medication 400 MILLIGRAM(S): at 05:05

## 2022-01-02 RX ADMIN — CYCLOSPORINE 325 MILLIGRAM(S): 100 CAPSULE ORAL at 17:45

## 2022-01-02 RX ADMIN — Medication 15 MILLILITER(S): at 23:43

## 2022-01-02 NOTE — PROGRESS NOTE ADULT - PROBLEM SELECTOR PLAN 2
Neutropenic, febrile   continue cefepime , diflucan, Acyclovir for now  throat culture (-).  MRSA (-), staph aureus PCR +.  CT CAP  to look for source of infection- No CT evidence for source of infection in the chest, abdomen or pelvis.  12/24- Staph aureus PCR detected, will start Vanco 1 gm Q 12 hrs.  12/26- Vanco discontinued on 12/26 as patient continue to be afebrile and CT maxillo facial did not reveal any abscess.  ID following Neutropenic, afebrile   Cultures 12/29 no growth  continue cefepime , diflucan, Acyclovir for now  throat culture (-).  MRSA (-), staph aureus PCR +.  CT CAP  to look for source of infection- No CT evidence for source of infection in the chest, abdomen or pelvis.  12/24- Staph aureus PCR detected, will start Vanco 1 gm Q 12 hrs.  12/26- Vanco discontinued on 12/26 as patient continue to be afebrile and CT maxillo facial did not reveal any abscess.  ID following

## 2022-01-02 NOTE — PROGRESS NOTE ADULT - ATTENDING COMMENTS
36M with Aplastic Anemia diagnosed April 2020.  Underwent treatment with equine ATG, Cyclosporine, and Promacta.  Went into remission (BM bx 5/2021 with normal morphology and cellularity).  Promacta stopped May 2021.  Cyclosporin stopped August 2021.  By September, platelets started to drop.  Now with pancytopenia and neutropenic fever.  No obvious focus of infection, however, c/o sorethroat.  On cyclosporine 300 mg BID (started on 12/26/21), first level on 12/29/21 -- PENDING  Today is day 7    Today: 12/30 tachy and febrile after dose 1 of ATG, EKG shows sinus tachy. Will hold dose 2. Reevaluate tomorrow.  - Febrile on 12/23/21, on cefepime and fluconazole, blood cultures negative, continue cefepime for now  - Nasal MRSA + for staph aureus (not detected though for MRSA). Will start vancomycin.   - Dental consult with x-rays, no dental source of patient's R sided gum pain. CT scan showed some sinus opacification but asymptomatic from that standpoint.  - Discussed with patient today about the possibility of going straight to allogeneic transplant, he has 30 year old sister. Have discussed transplant with patient extensively in the past.   - Afebrile now for over 24 hours. Discontinued vancomycin.   - 12/29 : Day 1 :cyclosporine as 300 mg BID (monitor level), rabit ATG, promacta  - Pt decided for rabbit ATG, s/p test dose 12/27 tolerated well. started 12/29 daily x5d, but Held due to infusion reaction with ATG  - prednisone for serum sickness PPX 36M with Aplastic Anemia diagnosed April 2020.  Underwent treatment with equine ATG, Cyclosporine, and Promacta.  Went into remission (BM bx 5/2021 with normal morphology and cellularity).  Promacta stopped May 2021.  Cyclosporin stopped August 2021.  By September, platelets started to drop.  Now with pancytopenia and neutropenic fever.  No obvious focus of infection, however, c/o sorethroat.  On cyclosporine 300 mg BID (started on 12/26/21), first level on 12/29/21 --197. increased dose to 325mg bid, repeat level 230.  Today is day 7    Today: 12/30 tachy and febrile after dose 1 of ATG, EKG shows sinus tachy. Will hold dose 2. Reevaluate tomorrow.  - Febrile on 12/23/21, on cefepime and fluconazole, blood cultures negative, continue cefepime for now  - Nasal MRSA + for staph aureus (not detected though for MRSA). Will start vancomycin.   - Dental consult with x-rays, no dental source of patient's R sided gum pain. CT scan showed some sinus opacification but asymptomatic from that standpoint.  - Discussed with patient about the possibility of going straight to allogeneic transplant, he has 30 year old sister. Have discussed transplant with patient extensively in the past.   - Afebrile now for over 24 hours. Discontinued vancomycin.   - 12/29 : Day 1 :cyclosporine as 300 mg BID (monitor level), rabit ATG, promacta  - Pt decided for rabbit ATG, s/p test dose 12/27 tolerated well. started 12/29 daily x5d, but Held due to infusion reaction with ATG  - prednisone for serum sickness PPX 36M with Aplastic Anemia diagnosed April 2020.  Underwent treatment with equine ATG, Cyclosporine, and Promacta.  Went into remission (BM bx 5/2021 with normal morphology and cellularity).  Promacta stopped May 2021.  Cyclosporin stopped August 2021.  By September, platelets started to drop.  Now with pancytopenia and neutropenic fever.  No obvious focus of infection, however, c/o sorethroat.  On cyclosporine 300 mg BID (started on 12/26/21), first level on 12/29/21 --197. increased dose to 325mg bid, repeat level 230.  Today is day 8    Today: 12/30 tachy and febrile after dose 1 of ATG, EKG shows sinus tachy. Will hold dose 2. Reevaluate tomorrow.  - Febrile on 12/23/21, on cefepime and fluconazole, blood cultures negative, continue cefepime for now  - Nasal MRSA + for staph aureus (not detected though for MRSA). Will start vancomycin.   - Dental consult with x-rays, no dental source of patient's R sided gum pain. CT scan showed some sinus opacification but asymptomatic from that standpoint.  - Discussed with patient about the possibility of going straight to allogeneic transplant, he has 30 year old sister. Have discussed transplant with patient extensively in the past.   - Afebrile now for over 24 hours. Discontinued vancomycin.   - 12/29 : Day 1 :cyclosporine as 300 mg BID (monitor level), rabit ATG, promacta  - Pt decided for rabbit ATG, s/p test dose 12/27 tolerated well. started 12/29 daily x5d, but Held due to infusion reaction with ATG  - prednisone for serum sickness PPX

## 2022-01-02 NOTE — PROGRESS NOTE ADULT - PROBLEM SELECTOR PLAN 3
no Lovenox DVT OPPX due to thrombocytopenia  Encourage OOB and ambulation 12/24-  CT maxillofacial with contrast and CT neck with contrast to r/o abscess. (-)  12/26- pain control., First mouth wash, local Lidocaine adm with Q tips.  Dental following

## 2022-01-02 NOTE — PROGRESS NOTE ADULT - ASSESSMENT
36M with Aplastic Anemia diagnosed April 2020, s/p equine ATG, Cyclosporine, and Promacta. Bone marrow biopsy was done on 5/2021 revealed normal morphology and cellularity, eventually Promacta was stopped and Cyclosporin in 5/2021, in September 2021 noted his platelets dropping  now admitted with neutropenic fever found to have pancytopenia secondary to disease condition.  Bone marrow biopsy was repeated on 12/2021 showed decreased megakaryocytes; Rabbit ATG/ prednisone  started on 12/29/21. Hospital course complicated by ATG reaction, and neutropenic fever. Pt has pancytopenia secondary to chemotherapy and or disease   36M with Aplastic Anemia diagnosed April 2020, s/p equine ATG, Cyclosporine, and Promacta. Bone marrow biopsy was done on 5/2021 revealed normal morphology and cellularity, eventually Promacta and Cyclosporine was stopped in 5/2021. Then noted his platelets dropping in September 2021.   now admitted with neutropenic fever found to have pancytopenia secondary to disease condition.  Bone marrow biopsy was repeated on 12/2021 showed decreased megakaryocytes; Rabbit ATG/ prednisone  started on 12/29/21. Hospital course complicated by ATG reaction, and neutropenic fever. Pt has pancytopenia secondary to chemotherapy and or disease   Mr. Olivera is a 35 yo M with Aplastic Anemia diagnosed  April 2020, s/p equine ATG, Cyclosporine, and Promacta. Bone marrow biopsy was done on 5/2021 revealed normal morphology and cellularity, eventually Promacta and Cyclosporine was stopped in 5/2021. Then noted his platelets dropping in September 2021.   Now admitted with neutropenic fever found to have pancytopenia secondary to disease condition.  Bone marrow biopsy was repeated on 12/2021 showed decreased megakaryocytes c/w relapsed disease. Rabbit ATG/ prednisone  started on 12/29/21. Hospital course complicated by ATG reaction, and neutropenic fever. Pt has pancytopenia secondary to chemotherapy and or disease

## 2022-01-02 NOTE — PROGRESS NOTE ADULT - PROBLEM SELECTOR PLAN 4
12/24-  CT maxillofacial with contrast and CT neck with contrast to r/o abscess. (-)  12/26- pain control., First mouth wash, local Lidocaine adm with Q tips.  Dental following no Lovenox DVT OPPX due to thrombocytopenia  Encourage OOB and ambulation

## 2022-01-02 NOTE — PROGRESS NOTE ADULT - PROBLEM SELECTOR PLAN 1
Relapsed Aplastic Anemia   S/p ATG, CSA, and promacta with complete remission  BM bx on 11/9/20 revealed hypocellular marrow.  BMbx 12/2021showing erythroid predominance, myeloid and erythroid maturation, and markedly decreased megakaryocytes   Pt states he has not been able to receive Promacta outpatient x~10 days. Sent script to [facility] at [facility] on 12/22 12/23  transferred  to [facility] for ATG treatment, plan on 12/24.  Monitor CBC with diff/lytes, transfuse and or replete PRN  Anemia PRBC x 1 unit   Thrombocytopenia Platelet x 1 unit   Hypomagnesemia- Magnesium sulphate 1 g IV x 1   Monitor weight, I and O, mouth care  12/26- Start Cyclosporine 300 mg PO Q 12 hrs   12/27 test dose - no reaction noted  12/28 PICC placement  12/29 Started  Rabbit ATG/prednisone/;  12/29 SCA level 197, dose increased to 325 PO BID, follow up CSA level on Saturday 1/1/22 12/29 Reaction to ATG- Benadryl, Hydrocortisone given   HELD on 12/30 due to reaction ; Continue promacta  12/31 will reassess in am Relapsed Aplastic Anemia   S/p ATG, CSA, and promacta with complete remission  BM bx on 11/9/20 revealed hypocellular marrow.  BMbx 12/2021showing erythroid predominance, myeloid and erythroid maturation, and markedly decreased megakaryocytes   Pt states he has not been able to receive Promacta outpatient x~10 days. Sent script to [facility] at [facility] on12/22 12/23  transferred  to [facility] for ATG treatment, plan on 12/24.  Monitor CBC with diff/lytes, transfuse and or replete PRN  Monitor weight, I and O, mouth care  12/26- Started Cyclosporine 300 mg PO Q 12 hrs   12/27 test dose - no reaction noted  12/28 PICC placed  12/29 Started  Rabbit ATG/prednisone  12/29 SCA level 197, dose increased to 325 PO BID, follow up CSA level on Saturday 1/1/22 = 230  12/29 Reaction to ATG- Benadryl, Hydrocortisone given   HELD on 12/30 due to reaction ; Continue promacta  Fever 12/30 pm, cultures negative  1/3 discuss restarting rabbit ATG Relapsed Aplastic Anemia   s/p hATG, CSA, and promacta with complete remission in 2020  BM bx on 11/9/20 revealed hypocellular marrow.  BMbx 12/2021 showing erythroid predominance, myeloid and erythroid maturation, and markedly decreased megakaryocytes c/w relapse  unable to receive Promacta outpatient x~10 days. Sent script to [facility] on 12/22 12/23-   transferred  to [facility] for rATG treatment  Monitor CBC with diff/lytes, transfuse and or replete PRN  Monitor weight, I and O, mouth care  12/26- Started Cyclosporine 300 mg PO Q 12 hrs   12/27 test dose - no reaction noted  12/28 PICC placed  12/29 Started  Rabbit ATG/prednisone  12/29 SCA level 197, dose increased to 325 PO BID, follow up CSA level on Saturday 1/1/22 = 230  12/29 Reaction to rATG- Benadryl, Hydrocortisone given   HELD on 12/30 due to reaction ; Continue promacta  Fever 12/30 pm, cultures negative  1/3 discuss restarting rabbit ATG

## 2022-01-02 NOTE — PROGRESS NOTE ADULT - SUBJECTIVE AND OBJECTIVE BOX
Diagnosis: Relapsed aplastic anemia    Protocol/Chemo Regimen:  Rabbit ATG/Cyclosporine/prednisone/Promacta     Day: D8 CSA / Rabbit ATG (D2 suspended due to reaction)     Pt endorsed:    Review of Systems:    Pain scale:                                        Location:    Diet:     Allergies: No Known Allergies      ANTIMICROBIALS  acyclovir   Oral Tab/Cap 400 milliGRAM(s) Oral every 8 hours  cefepime   IVPB 2000 milliGRAM(s) IV Intermittent every 8 hours  fluconAZOLE   Tablet 200 milliGRAM(s) Oral daily      HEME/ONC MEDICATIONS  eltrombopag 150 milliGRAM(s) Oral daily      STANDING MEDICATIONS  Biotene Dry Mouth Oral Rinse 15 milliLiter(s) Swish and Spit four times a day  chlorhexidine 2% Cloths 1 Application(s) Topical <User Schedule>  cycloSPORINE  (SandIMMUNE) 325 milliGRAM(s) Oral every 12 hours  famotidine Injectable 20 milliGRAM(s) IV Push every 12 hours  FIRST- Mouthwash  BLM 10 milliLiter(s) Swish and Spit four times a day  lidocaine 2% Viscous 2 milliLiter(s) Oral four times a day  methylPREDNISolone sodium succinate IVPB 500 milliGRAM(s) IV Intermittent once  pantoprazole    Tablet 40 milliGRAM(s) Oral before breakfast  sodium chloride 0.65% Nasal 1 Spray(s) Both Nostrils four times a day  sodium chloride 0.9%. 1000 milliLiter(s) IV Continuous <Continuous>      PRN MEDICATIONS  acetaminophen     Tablet .. 650 milliGRAM(s) Oral every 6 hours PRN  acetaminophen   IVPB .. 1000 milliGRAM(s) IV Intermittent once PRN  aluminum hydroxide/magnesium hydroxide/simethicone Suspension 30 milliLiter(s) Oral every 4 hours PRN  calcium carbonate    500 mG (Tums) Chewable 1 Tablet(s) Chew every 4 hours PRN  diphenhydrAMINE 25 milliGRAM(s) Oral every 12 hours PRN  diphenhydrAMINE Injectable 25 milliGRAM(s) IV Push once PRN  EPINEPHrine     1 mG/mL Injectable 0.3 milliGRAM(s) IntraMuscular once PRN  hydrocortisone sodium succinate Injectable 50 milliGRAM(s) IV Push every 12 hours PRN  metoclopramide Injectable 10 milliGRAM(s) IV Push every 6 hours PRN  ondansetron Injectable 8 milliGRAM(s) IV Push every 8 hours PRN        Vital Signs Last 24 Hrs  T(C): 37 (02 Jan 2022 04:57), Max: 37.1 (02 Jan 2022 00:53)  T(F): 98.6 (02 Jan 2022 04:57), Max: 98.7 (02 Jan 2022 00:53)  HR: 97 (02 Jan 2022 04:57) (84 - 98)  BP: 134/76 (02 Jan 2022 04:57) (111/69 - 140/93)  BP(mean): --  RR: 18 (02 Jan 2022 04:57) (16 - 18)  SpO2: 96% (02 Jan 2022 04:57) (96% - 98%)    PHYSICAL EXAM  General: adult in NAD  HEENT: clear oropharynx, anicteric sclera, pink conjunctiva  Neck: supple  CV: normal S1/S2 RRR  Lungs: positive air movement b/l ant lungs,clear to auscultation, no wheezes, no rales  Abdomen: soft non-tender non-distended, no hepatosplenomegaly  Ext: no clubbing cyanosis or edema  Skin: no rashes and no petechiae  Neuro: alert and oriented X 4, no focal deficits  Central Line: normal    LABS:    Blood Cultures:                           7.1    0.54  )-----------( 20       ( 01 Jan 2022 07:17 )             19.3         Mean Cell Volume : 83.9 fl  Mean Cell Hemoglobin : 30.9 pg  Mean Cell Hemoglobin Concentration : 36.8 gm/dL  Auto Neutrophil # : 0.19 K/uL  Auto Lymphocyte # : 0.33 K/uL  Auto Monocyte # : 0.02 K/uL  Auto Eosinophil # : 0.00 K/uL  Auto Basophil # : 0.00 K/uL  Auto Neutrophil % : 34.6 %  Auto Lymphocyte % : 61.5 %  Auto Monocyte % : 3.9 %  Auto Eosinophil % : 0.0 %  Auto Basophil % : 0.0 %      01-01    137  |  105  |  16  ----------------------------<  95  3.9   |  20<L>  |  0.93    Ca    9.1      01 Jan 2022 07:23  Phos  2.6     01-01  Mg     1.8     01-01    TPro  6.3  /  Alb  3.4  /  TBili  0.8  /  DBili  x   /  AST  12  /  ALT  26  /  AlkPhos  56  01-01          PT/INR - ( 01 Jan 2022 07:26 )   PT: 14.5 sec;   INR: 1.22 ratio         PTT - ( 01 Jan 2022 07:26 )  PTT:28.2 sec        RADIOLOGY & ADDITIONAL STUDIES:         Diagnosis: Relapsed aplastic anemia    Protocol/Chemo Regimen:  Rabbit ATG/Cyclosporine/prednisone/Promacta     Day: D8 CSA / Rabbit ATG (D2 suspended due to reaction)     Pt endorsed:    Review of Systems:    Pain scale:                                        Location:    Diet:     Allergies: No Known Allergies      ANTIMICROBIALS  acyclovir   Oral Tab/Cap 400 milliGRAM(s) Oral every 8 hours  cefepime   IVPB 2000 milliGRAM(s) IV Intermittent every 8 hours  fluconAZOLE   Tablet 200 milliGRAM(s) Oral daily      HEME/ONC MEDICATIONS  eltrombopag 150 milliGRAM(s) Oral daily      STANDING MEDICATIONS  Biotene Dry Mouth Oral Rinse 15 milliLiter(s) Swish and Spit four times a day  chlorhexidine 2% Cloths 1 Application(s) Topical <User Schedule>  cycloSPORINE  (SandIMMUNE) 325 milliGRAM(s) Oral every 12 hours  famotidine Injectable 20 milliGRAM(s) IV Push every 12 hours  FIRST- Mouthwash  BLM 10 milliLiter(s) Swish and Spit four times a day  lidocaine 2% Viscous 2 milliLiter(s) Oral four times a day  methylPREDNISolone sodium succinate IVPB 500 milliGRAM(s) IV Intermittent once  pantoprazole    Tablet 40 milliGRAM(s) Oral before breakfast  sodium chloride 0.65% Nasal 1 Spray(s) Both Nostrils four times a day  sodium chloride 0.9%. 1000 milliLiter(s) IV Continuous <Continuous>      PRN MEDICATIONS  acetaminophen     Tablet .. 650 milliGRAM(s) Oral every 6 hours PRN  acetaminophen   IVPB .. 1000 milliGRAM(s) IV Intermittent once PRN  aluminum hydroxide/magnesium hydroxide/simethicone Suspension 30 milliLiter(s) Oral every 4 hours PRN  calcium carbonate    500 mG (Tums) Chewable 1 Tablet(s) Chew every 4 hours PRN  diphenhydrAMINE 25 milliGRAM(s) Oral every 12 hours PRN  diphenhydrAMINE Injectable 25 milliGRAM(s) IV Push once PRN  EPINEPHrine     1 mG/mL Injectable 0.3 milliGRAM(s) IntraMuscular once PRN  hydrocortisone sodium succinate Injectable 50 milliGRAM(s) IV Push every 12 hours PRN  metoclopramide Injectable 10 milliGRAM(s) IV Push every 6 hours PRN  ondansetron Injectable 8 milliGRAM(s) IV Push every 8 hours PRN        Vital Signs Last 24 Hrs  T(C): 37 (02 Jan 2022 04:57), Max: 37.1 (02 Jan 2022 00:53)  T(F): 98.6 (02 Jan 2022 04:57), Max: 98.7 (02 Jan 2022 00:53)  HR: 97 (02 Jan 2022 04:57) (84 - 98)  BP: 134/76 (02 Jan 2022 04:57) (111/69 - 140/93)  BP(mean): --  RR: 18 (02 Jan 2022 04:57) (16 - 18)  SpO2: 96% (02 Jan 2022 04:57) (96% - 98%)    PHYSICAL EXAM  General: adult in NAD  HEENT: clear oropharynx  Neck: supple  CV: normal S1/S2 RRR  Lungs: clear to auscultation, no wheezes, no rales  Abdomen: soft non-tender non-distended  Ext: no edema  Skin: no rash  Neuro: alert and oriented X 4  Central Line: picc c/d/i    Cultures:  Culture - Urine (12.30.21 @ 00:57)    Specimen Source: Clean Catch Clean Catch (Midstream)    Culture Results:   No growth    Culture - Blood (12.29.21 @ 21:56)    Specimen Source: .Blood Blood-Peripheral    Culture Results:   No growth to date.    Culture - Blood (12.29.21 @ 21:56)    Specimen Source: .Blood Blood-Catheter    Culture Results:   No growth to date.    LABS:                                        7.6    0.57  )-----------( 15       ( 02 Jan 2022 07:13 )             20.9     02 Jan 2022 07:13    137    |  105    |  16     ----------------------------<  93     4.0     |  18     |  0.88     Ca    8.7        02 Jan 2022 07:13  Phos  3.1       02 Jan 2022 07:13  Mg     1.7       02 Jan 2022 07:13    TPro  6.6    /  Alb  3.6    /  TBili  0.9    /  DBili  x      /  AST  15     /  ALT  33     /  AlkPhos  62     02 Jan 2022 07:13    PT/INR - ( 01 Jan 2022 07:26 )   PT: 14.5 sec;   INR: 1.22 ratio    PTT - ( 01 Jan 2022 07:26 )  PTT:28.2 sec    LIVER FUNCTIONS - ( 02 Jan 2022 07:13 )  Alb: 3.6 g/dL / Pro: 6.6 g/dL / ALK PHOS: 62 U/L / ALT: 33 U/L / AST: 15 U/L / GGT: x           RADIOLOGY & ADDITIONAL STUDIES:  from: Xray Chest 1 View- PORTABLE-Urgent (Xray Chest 1 View- PORTABLE-Urgent .) (12.30.21 @ 11:22)   FINDINGS:  Clear lungs. No pleural effusion or pneumothorax.  The heart is normal in size. Right-sided central venous catheter   unchanged prior exam.  No acute osseous findings.    IMPRESSION:  Clear lungs.     Diagnosis: Relapsed aplastic anemia    Protocol/Chemo Regimen:  Rabbit ATG/Cyclosporine/prednisone/Promacta     Day: D8 CSA / Rabbit ATG (D2 suspended due to reaction)     Pt endorsed: intermittent headache, currently resolved    Review of Systems: denies nausea, vomiting, diarrhea, dizziness, chest pain , SOB    Pain scale: 0                                          Diet: regular    Allergies: No Known Allergies      ANTIMICROBIALS  acyclovir   Oral Tab/Cap 400 milliGRAM(s) Oral every 8 hours  cefepime   IVPB 2000 milliGRAM(s) IV Intermittent every 8 hours  fluconAZOLE   Tablet 200 milliGRAM(s) Oral daily      HEME/ONC MEDICATIONS  eltrombopag 150 milliGRAM(s) Oral daily      STANDING MEDICATIONS  Biotene Dry Mouth Oral Rinse 15 milliLiter(s) Swish and Spit four times a day  chlorhexidine 2% Cloths 1 Application(s) Topical <User Schedule>  cycloSPORINE  (SandIMMUNE) 325 milliGRAM(s) Oral every 12 hours  famotidine Injectable 20 milliGRAM(s) IV Push every 12 hours  FIRST- Mouthwash  BLM 10 milliLiter(s) Swish and Spit four times a day  lidocaine 2% Viscous 2 milliLiter(s) Oral four times a day  methylPREDNISolone sodium succinate IVPB 500 milliGRAM(s) IV Intermittent once  pantoprazole    Tablet 40 milliGRAM(s) Oral before breakfast  sodium chloride 0.65% Nasal 1 Spray(s) Both Nostrils four times a day  sodium chloride 0.9%. 1000 milliLiter(s) IV Continuous <Continuous>      PRN MEDICATIONS  acetaminophen     Tablet .. 650 milliGRAM(s) Oral every 6 hours PRN  acetaminophen   IVPB .. 1000 milliGRAM(s) IV Intermittent once PRN  aluminum hydroxide/magnesium hydroxide/simethicone Suspension 30 milliLiter(s) Oral every 4 hours PRN  calcium carbonate    500 mG (Tums) Chewable 1 Tablet(s) Chew every 4 hours PRN  diphenhydrAMINE 25 milliGRAM(s) Oral every 12 hours PRN  diphenhydrAMINE Injectable 25 milliGRAM(s) IV Push once PRN  EPINEPHrine     1 mG/mL Injectable 0.3 milliGRAM(s) IntraMuscular once PRN  hydrocortisone sodium succinate Injectable 50 milliGRAM(s) IV Push every 12 hours PRN  metoclopramide Injectable 10 milliGRAM(s) IV Push every 6 hours PRN  ondansetron Injectable 8 milliGRAM(s) IV Push every 8 hours PRN      Vital Signs Last 24 Hrs  T(C): 37 (02 Jan 2022 04:57), Max: 37.1 (02 Jan 2022 00:53)  T(F): 98.6 (02 Jan 2022 04:57), Max: 98.7 (02 Jan 2022 00:53)  HR: 97 (02 Jan 2022 04:57) (84 - 98)  BP: 134/76 (02 Jan 2022 04:57) (111/69 - 140/93)  RR: 18 (02 Jan 2022 04:57) (16 - 18)  SpO2: 96% (02 Jan 2022 04:57) (96% - 98%)    PHYSICAL EXAM  General: adult in NAD  HEENT: clear oropharynx  Neck: supple  CV: normal S1/S2 RRR  Lungs: clear to auscultation, no wheezes, no rales  Abdomen: soft non-tender non-distended  Ext: no edema  Skin: no rash  Neuro: alert and oriented X 4  Central Line: RUE PICC  c/d/i    Cultures:  Culture - Urine (12.30.21 @ 00:57)    Specimen Source: Clean Catch Clean Catch (Midstream)    Culture Results:   No growth    Culture - Blood (12.29.21 @ 21:56)    Specimen Source: .Blood Blood-Peripheral    Culture Results:   No growth to date.    Culture - Blood (12.29.21 @ 21:56)    Specimen Source: .Blood Blood-Catheter    Culture Results:   No growth to date.      LABS:                                        7.6    0.57  )-----------( 15       ( 02 Jan 2022 07:13 )             20.9     02 Jan 2022 07:13    137    |  105    |  16     ----------------------------<  93     4.0     |  18     |  0.88     Ca    8.7        02 Jan 2022 07:13  Phos  3.1       02 Jan 2022 07:13  Mg     1.7       02 Jan 2022 07:13    TPro  6.6    /  Alb  3.6    /  TBili  0.9    /  DBili  x      /  AST  15     /  ALT  33     /  AlkPhos  62     02 Jan 2022 07:13    PT/INR - ( 01 Jan 2022 07:26 )   PT: 14.5 sec;   INR: 1.22 ratio    PTT - ( 01 Jan 2022 07:26 )  PTT:28.2 sec    LIVER FUNCTIONS - ( 02 Jan 2022 07:13 )  Alb: 3.6 g/dL / Pro: 6.6 g/dL / ALK PHOS: 62 U/L / ALT: 33 U/L / AST: 15 U/L / GGT: x           RADIOLOGY & ADDITIONAL STUDIES:  from: Xray Chest 1 View- PORTABLE-Urgent (Xray Chest 1 View- PORTABLE-Urgent .) (12.30.21 @ 11:22)   FINDINGS:  Clear lungs. No pleural effusion or pneumothorax.  The heart is normal in size. Right-sided central venous catheter   unchanged prior exam.  No acute osseous findings.    IMPRESSION:  Clear lungs.     Diagnosis: Relapsed aplastic anemia    Protocol/Chemo Regimen:  Rabbit ATG/Cyclosporine/prednisone/Promacta     Day: D8 CSA / Rabbit ATG (D2 suspended due to reaction)     Pt endorsed: intermittent headache, currently resolved    Review of Systems: denies nausea, vomiting, diarrhea, dizziness, chest pain , SOB    Pain scale: 0                                          Diet: regular    Allergies: No Known Allergies      ANTIMICROBIALS  acyclovir   Oral Tab/Cap 400 milliGRAM(s) Oral every 8 hours  cefepime   IVPB 2000 milliGRAM(s) IV Intermittent every 8 hours  fluconAZOLE   Tablet 200 milliGRAM(s) Oral daily      HEME/ONC MEDICATIONS  eltrombopag 150 milliGRAM(s) Oral daily      STANDING MEDICATIONS  Biotene Dry Mouth Oral Rinse 15 milliLiter(s) Swish and Spit four times a day  chlorhexidine 2% Cloths 1 Application(s) Topical <User Schedule>  cycloSPORINE  (SandIMMUNE) 325 milliGRAM(s) Oral every 12 hours  famotidine Injectable 20 milliGRAM(s) IV Push every 12 hours  FIRST- Mouthwash  BLM 10 milliLiter(s) Swish and Spit four times a day  lidocaine 2% Viscous 2 milliLiter(s) Oral four times a day  methylPREDNISolone sodium succinate IVPB 500 milliGRAM(s) IV Intermittent once  pantoprazole    Tablet 40 milliGRAM(s) Oral before breakfast  sodium chloride 0.65% Nasal 1 Spray(s) Both Nostrils four times a day  sodium chloride 0.9%. 1000 milliLiter(s) IV Continuous <Continuous>      PRN MEDICATIONS  acetaminophen     Tablet .. 650 milliGRAM(s) Oral every 6 hours PRN  acetaminophen   IVPB .. 1000 milliGRAM(s) IV Intermittent once PRN  aluminum hydroxide/magnesium hydroxide/simethicone Suspension 30 milliLiter(s) Oral every 4 hours PRN  calcium carbonate    500 mG (Tums) Chewable 1 Tablet(s) Chew every 4 hours PRN  diphenhydrAMINE 25 milliGRAM(s) Oral every 12 hours PRN  diphenhydrAMINE Injectable 25 milliGRAM(s) IV Push once PRN  EPINEPHrine     1 mG/mL Injectable 0.3 milliGRAM(s) IntraMuscular once PRN  hydrocortisone sodium succinate Injectable 50 milliGRAM(s) IV Push every 12 hours PRN  metoclopramide Injectable 10 milliGRAM(s) IV Push every 6 hours PRN  ondansetron Injectable 8 milliGRAM(s) IV Push every 8 hours PRN      Vital Signs Last 24 Hrs  T(C): 37 (02 Jan 2022 04:57), Max: 37.1 (02 Jan 2022 00:53)  T(F): 98.6 (02 Jan 2022 04:57), Max: 98.7 (02 Jan 2022 00:53)  HR: 97 (02 Jan 2022 04:57) (84 - 98)  BP: 134/76 (02 Jan 2022 04:57) (111/69 - 140/93)  RR: 18 (02 Jan 2022 04:57) (16 - 18)  SpO2: 96% (02 Jan 2022 04:57) (96% - 98%)    PHYSICAL EXAM  General: adult in NAD  HEENT: clear oropharynx  Neck: supple  CV: normal S1/S2 RRR  Lungs: clear to auscultation, no wheezes, no rales  Abdomen: soft non-tender non-distended  Ext: no edema  Skin: no rash  Neuro: alert and oriented X 4  Central Line: RUE PICC  c/d/i    Cultures:  Culture - Urine (12.30.21 @ 00:57)    Specimen Source: Clean Catch Clean Catch (Midstream)    Culture Results:   No growth    Culture - Blood (12.29.21 @ 21:56)    Specimen Source: .Blood Blood-Peripheral    Culture Results:   No growth to date.    Culture - Blood (12.29.21 @ 21:56)    Specimen Source: .Blood Blood-Catheter    Culture Results:   No growth to date.      LABS:                                          7.6    0.57  )-----------( 15       ( 02 Jan 2022 07:13 )             20.9     02 Jan 2022 07:13    137    |  105    |  16     ----------------------------<  93     4.0     |  18     |  0.88     Ca    8.7        02 Jan 2022 07:13  Phos  3.1       02 Jan 2022 07:13  Mg     1.7       02 Jan 2022 07:13    TPro  6.6    /  Alb  3.6    /  TBili  0.9    /  DBili  x      /  AST  15     /  ALT  33     /  AlkPhos  62     02 Jan 2022 07:13    PT/INR - ( 01 Jan 2022 07:26 )   PT: 14.5 sec;   INR: 1.22 ratio    PTT - ( 01 Jan 2022 07:26 )  PTT:28.2 sec    LIVER FUNCTIONS - ( 02 Jan 2022 07:13 )  Alb: 3.6 g/dL / Pro: 6.6 g/dL / ALK PHOS: 62 U/L / ALT: 33 U/L / AST: 15 U/L / GGT: x           Cyclosporine Level (01.01.22 @ 08:49)    Cyclosporine Level: 230:     RADIOLOGY & ADDITIONAL STUDIES:  from: Xray Chest 1 View- PORTABLE-Urgent (Xray Chest 1 View- PORTABLE-Urgent .) (12.30.21 @ 11:22)   FINDINGS:  Clear lungs. No pleural effusion or pneumothorax.  The heart is normal in size. Right-sided central venous catheter   unchanged prior exam.  No acute osseous findings.    IMPRESSION:  Clear lungs.

## 2022-01-03 LAB
ALBUMIN SERPL ELPH-MCNC: 3.7 G/DL — SIGNIFICANT CHANGE UP (ref 3.3–5)
ALBUMIN SERPL ELPH-MCNC: 4.7 G/DL
ALP BLD-CCNC: 84 U/L
ALP SERPL-CCNC: 58 U/L — SIGNIFICANT CHANGE UP (ref 40–120)
ALT FLD-CCNC: 32 U/L — SIGNIFICANT CHANGE UP (ref 10–45)
ALT SERPL-CCNC: 37 U/L
ANION GAP SERPL CALC-SCNC: 12 MMOL/L
ANION GAP SERPL CALC-SCNC: 13 MMOL/L — SIGNIFICANT CHANGE UP (ref 5–17)
APTT BLD: 29.9 SEC — SIGNIFICANT CHANGE UP (ref 27.5–35.5)
AST SERPL-CCNC: 13 U/L — SIGNIFICANT CHANGE UP (ref 10–40)
AST SERPL-CCNC: 20 U/L
BASOPHILS # BLD AUTO: 0 K/UL — SIGNIFICANT CHANGE UP (ref 0–0.2)
BASOPHILS NFR BLD AUTO: 0 % — SIGNIFICANT CHANGE UP (ref 0–2)
BILIRUB SERPL-MCNC: 0.6 MG/DL
BILIRUB SERPL-MCNC: 1.2 MG/DL — SIGNIFICANT CHANGE UP (ref 0.2–1.2)
BLD GP AB SCN SERPL QL: NEGATIVE — SIGNIFICANT CHANGE UP
BUN SERPL-MCNC: 17 MG/DL — SIGNIFICANT CHANGE UP (ref 7–23)
BUN SERPL-MCNC: 18 MG/DL
CALCIUM SERPL-MCNC: 8.8 MG/DL — SIGNIFICANT CHANGE UP (ref 8.4–10.5)
CALCIUM SERPL-MCNC: 9.5 MG/DL
CHLORIDE SERPL-SCNC: 105 MMOL/L
CHLORIDE SERPL-SCNC: 105 MMOL/L — SIGNIFICANT CHANGE UP (ref 96–108)
CO2 SERPL-SCNC: 19 MMOL/L — LOW (ref 22–31)
CO2 SERPL-SCNC: 24 MMOL/L
CREAT SERPL-MCNC: 0.87 MG/DL — SIGNIFICANT CHANGE UP (ref 0.5–1.3)
CREAT SERPL-MCNC: 1.05 MG/DL
CULTURE RESULTS: SIGNIFICANT CHANGE UP
CULTURE RESULTS: SIGNIFICANT CHANGE UP
CYCLOSPORINE SER-MCNC: 788 NG/ML
EOSINOPHIL # BLD AUTO: 0 K/UL — SIGNIFICANT CHANGE UP (ref 0–0.5)
EOSINOPHIL NFR BLD AUTO: 0 % — SIGNIFICANT CHANGE UP (ref 0–6)
FIBRINOGEN PPP-MCNC: 838 MG/DL — HIGH (ref 290–520)
GLUCOSE SERPL-MCNC: 117 MG/DL
GLUCOSE SERPL-MCNC: 87 MG/DL — SIGNIFICANT CHANGE UP (ref 70–99)
HCT VFR BLD CALC: 20.3 % — CRITICAL LOW (ref 39–50)
HGB BLD-MCNC: 7.3 G/DL — LOW (ref 13–17)
INR BLD: 1.2 RATIO — HIGH (ref 0.88–1.16)
LDH SERPL L TO P-CCNC: 211 U/L — SIGNIFICANT CHANGE UP (ref 50–242)
LYMPHOCYTES # BLD AUTO: 0.45 K/UL — LOW (ref 1–3.3)
LYMPHOCYTES # BLD AUTO: 67.6 % — HIGH (ref 13–44)
MAGNESIUM SERPL-MCNC: 1.6 MG/DL — SIGNIFICANT CHANGE UP (ref 1.6–2.6)
MANUAL SMEAR VERIFICATION: SIGNIFICANT CHANGE UP
MCHC RBC-ENTMCNC: 30.5 PG — SIGNIFICANT CHANGE UP (ref 27–34)
MCHC RBC-ENTMCNC: 36 GM/DL — SIGNIFICANT CHANGE UP (ref 32–36)
MCV RBC AUTO: 84.9 FL — SIGNIFICANT CHANGE UP (ref 80–100)
MONOCYTES # BLD AUTO: 0.09 K/UL — SIGNIFICANT CHANGE UP (ref 0–0.9)
MONOCYTES NFR BLD AUTO: 13.5 % — SIGNIFICANT CHANGE UP (ref 2–14)
NEUTROPHILS # BLD AUTO: 0.12 K/UL — LOW (ref 1.8–7.4)
NEUTROPHILS NFR BLD AUTO: 18.9 % — LOW (ref 43–77)
PHOSPHATE SERPL-MCNC: 3.7 MG/DL — SIGNIFICANT CHANGE UP (ref 2.5–4.5)
PLAT MORPH BLD: NORMAL — SIGNIFICANT CHANGE UP
PLATELET # BLD AUTO: 10 K/UL — CRITICAL LOW (ref 150–400)
POTASSIUM SERPL-MCNC: 3.9 MMOL/L — SIGNIFICANT CHANGE UP (ref 3.5–5.3)
POTASSIUM SERPL-SCNC: 3.9 MMOL/L — SIGNIFICANT CHANGE UP (ref 3.5–5.3)
POTASSIUM SERPL-SCNC: 4.6 MMOL/L
PROT SERPL-MCNC: 6.7 G/DL — SIGNIFICANT CHANGE UP (ref 6–8.3)
PROT SERPL-MCNC: 7.2 G/DL
PROTHROM AB SERPL-ACNC: 14.3 SEC — HIGH (ref 10.6–13.6)
RBC # BLD: 2.39 M/UL — LOW (ref 4.2–5.8)
RBC # FLD: 13 % — SIGNIFICANT CHANGE UP (ref 10.3–14.5)
RBC BLD AUTO: SIGNIFICANT CHANGE UP
RH IG SCN BLD-IMP: POSITIVE — SIGNIFICANT CHANGE UP
SARS-COV-2 RNA SPEC QL NAA+PROBE: SIGNIFICANT CHANGE UP
SMUDGE CELLS # BLD: PRESENT — SIGNIFICANT CHANGE UP
SODIUM SERPL-SCNC: 137 MMOL/L — SIGNIFICANT CHANGE UP (ref 135–145)
SODIUM SERPL-SCNC: 141 MMOL/L
SPECIMEN SOURCE: SIGNIFICANT CHANGE UP
SPECIMEN SOURCE: SIGNIFICANT CHANGE UP
URATE SERPL-MCNC: 3.4 MG/DL — SIGNIFICANT CHANGE UP (ref 3.4–8.8)
WBC # BLD: 0.66 K/UL — CRITICAL LOW (ref 3.8–10.5)
WBC # FLD AUTO: 0.66 K/UL — CRITICAL LOW (ref 3.8–10.5)

## 2022-01-03 PROCEDURE — 99232 SBSQ HOSP IP/OBS MODERATE 35: CPT

## 2022-01-03 PROCEDURE — 99233 SBSQ HOSP IP/OBS HIGH 50: CPT | Mod: GC

## 2022-01-03 RX ORDER — ATOVAQUONE 750 MG/5ML
1500 SUSPENSION ORAL DAILY
Refills: 0 | Status: DISCONTINUED | OUTPATIENT
Start: 2022-01-03 | End: 2022-01-20

## 2022-01-03 RX ORDER — ACETAMINOPHEN 500 MG
650 TABLET ORAL DAILY
Refills: 0 | Status: COMPLETED | OUTPATIENT
Start: 2022-01-03 | End: 2022-01-06

## 2022-01-03 RX ORDER — DIPHENHYDRAMINE HCL 50 MG
50 CAPSULE ORAL DAILY
Refills: 0 | Status: DISCONTINUED | OUTPATIENT
Start: 2022-01-03 | End: 2022-01-05

## 2022-01-03 RX ADMIN — Medication 400 MILLIGRAM(S): at 05:33

## 2022-01-03 RX ADMIN — SODIUM CHLORIDE 50 MILLILITER(S): 9 INJECTION INTRAMUSCULAR; INTRAVENOUS; SUBCUTANEOUS at 12:00

## 2022-01-03 RX ADMIN — Medication 50 MILLIGRAM(S): at 14:13

## 2022-01-03 RX ADMIN — CEFEPIME 100 MILLIGRAM(S): 1 INJECTION, POWDER, FOR SOLUTION INTRAMUSCULAR; INTRAVENOUS at 13:46

## 2022-01-03 RX ADMIN — CEFEPIME 100 MILLIGRAM(S): 1 INJECTION, POWDER, FOR SOLUTION INTRAMUSCULAR; INTRAVENOUS at 21:07

## 2022-01-03 RX ADMIN — CYCLOSPORINE 325 MILLIGRAM(S): 100 CAPSULE ORAL at 05:32

## 2022-01-03 RX ADMIN — Medication 1 SPRAY(S): at 05:38

## 2022-01-03 RX ADMIN — Medication 100 MILLIGRAM(S): at 14:14

## 2022-01-03 RX ADMIN — Medication 650 MILLIGRAM(S): at 14:13

## 2022-01-03 RX ADMIN — PANTOPRAZOLE SODIUM 40 MILLIGRAM(S): 20 TABLET, DELAYED RELEASE ORAL at 05:33

## 2022-01-03 RX ADMIN — CEFEPIME 100 MILLIGRAM(S): 1 INJECTION, POWDER, FOR SOLUTION INTRAMUSCULAR; INTRAVENOUS at 05:33

## 2022-01-03 RX ADMIN — Medication 15 MILLILITER(S): at 05:33

## 2022-01-03 RX ADMIN — ELTROMBOPAG OLAMINE 150 MILLIGRAM(S): 50 TABLET, FILM COATED ORAL at 11:35

## 2022-01-03 RX ADMIN — SODIUM CHLORIDE 50 MILLILITER(S): 9 INJECTION INTRAMUSCULAR; INTRAVENOUS; SUBCUTANEOUS at 21:08

## 2022-01-03 RX ADMIN — CYCLOSPORINE 325 MILLIGRAM(S): 100 CAPSULE ORAL at 17:34

## 2022-01-03 RX ADMIN — FAMOTIDINE 20 MILLIGRAM(S): 10 INJECTION INTRAVENOUS at 11:41

## 2022-01-03 RX ADMIN — FLUCONAZOLE 200 MILLIGRAM(S): 150 TABLET ORAL at 13:47

## 2022-01-03 RX ADMIN — Medication 400 MILLIGRAM(S): at 21:06

## 2022-01-03 RX ADMIN — CHLORHEXIDINE GLUCONATE 1 APPLICATION(S): 213 SOLUTION TOPICAL at 10:39

## 2022-01-03 RX ADMIN — Medication 15 MILLILITER(S): at 11:35

## 2022-01-03 RX ADMIN — Medication 400 MILLIGRAM(S): at 13:47

## 2022-01-03 RX ADMIN — ATOVAQUONE 1500 MILLIGRAM(S): 750 SUSPENSION ORAL at 17:26

## 2022-01-03 RX ADMIN — Medication 650 MILLIGRAM(S): at 00:19

## 2022-01-03 RX ADMIN — Medication 15 MILLILITER(S): at 17:31

## 2022-01-03 NOTE — PROGRESS NOTE ADULT - PROBLEM SELECTOR PLAN 2
Neutropenic, afebrile   Cultures 12/29 no growth  continue cefepime , diflucan, Acyclovir for now  throat culture (-).  MRSA (-), staph aureus PCR +.  CT CAP  to look for source of infection- No CT evidence for source of infection in the chest, abdomen or pelvis.  12/24- Staph aureus PCR detected, will start Vanco 1 gm Q 12 hrs.  12/26- Vanco discontinued on 12/26 as patient continue to be afebrile and CT maxillo facial did not reveal any abscess.  ID following Neutropenic, afebrile   continue cefepime , diflucan, Acyclovir for now  throat culture (-).  MRSA (-), staph aureus PCR +.  CT CAP  to look for source of infection- No CT evidence for source of infection in the chest, abdomen or pelvis.  12/24- Staph aureus PCR detected, will start Vanco 1 gm Q 12 hrs.  12/26- Vanco discontinued on 12/26 as patient continue to be afebrile and CT maxillo facial did not reveal any abscess.  ID following Neutropenic, afebrile   continue cefepime , diflucan, Acyclovir for now  throat culture (-).  MRSA (-), staph aureus PCR +.  CT CAP  to look for source of infection- No CT evidence for source of infection in the chest, abdomen or pelvis.  12/24- Staph aureus PCR detected, will start Vanco 1 gm Q 12 hrs.  12/26- Vanco discontinued on 12/26 as patient continue to be afebrile and CT maxillo facial did not reveal any abscess.  1/3 Started Mepron for ppx  ID following

## 2022-01-03 NOTE — PROGRESS NOTE ADULT - SUBJECTIVE AND OBJECTIVE BOX
Diagnosis: Relapsed aplastic anemia    Protocol/Chemo Regimen:  Rabbit ATG/Cyclosporine/prednisone/Promacta     Day: D8 CSA / Rabbit ATG (D2 suspended due to reaction)     Pt endorsed: intermittent headache, currently resolved    Review of Systems: denies nausea, vomiting, diarrhea, dizziness, chest pain , SOB    Pain scale: 0                                          Diet: regular    Allergies: No Known Allergies      ANTIMICROBIALS  acyclovir   Oral Tab/Cap 400 milliGRAM(s) Oral every 8 hours  cefepime   IVPB 2000 milliGRAM(s) IV Intermittent every 8 hours  fluconAZOLE   Tablet 200 milliGRAM(s) Oral daily      HEME/ONC MEDICATIONS  eltrombopag 150 milliGRAM(s) Oral daily      STANDING MEDICATIONS  Biotene Dry Mouth Oral Rinse 15 milliLiter(s) Swish and Spit four times a day  chlorhexidine 2% Cloths 1 Application(s) Topical <User Schedule>  cycloSPORINE  (SandIMMUNE) 325 milliGRAM(s) Oral every 12 hours  famotidine Injectable 20 milliGRAM(s) IV Push every 12 hours  FIRST- Mouthwash  BLM 10 milliLiter(s) Swish and Spit four times a day  lidocaine 2% Viscous 2 milliLiter(s) Oral four times a day  methylPREDNISolone sodium succinate IVPB 500 milliGRAM(s) IV Intermittent once  pantoprazole    Tablet 40 milliGRAM(s) Oral before breakfast  sodium chloride 0.65% Nasal 1 Spray(s) Both Nostrils four times a day  sodium chloride 0.9%. 1000 milliLiter(s) IV Continuous <Continuous>      PRN MEDICATIONS  acetaminophen     Tablet .. 650 milliGRAM(s) Oral every 6 hours PRN  acetaminophen   IVPB .. 1000 milliGRAM(s) IV Intermittent once PRN  aluminum hydroxide/magnesium hydroxide/simethicone Suspension 30 milliLiter(s) Oral every 4 hours PRN  calcium carbonate    500 mG (Tums) Chewable 1 Tablet(s) Chew every 4 hours PRN  diphenhydrAMINE 25 milliGRAM(s) Oral every 12 hours PRN  diphenhydrAMINE Injectable 25 milliGRAM(s) IV Push once PRN  EPINEPHrine     1 mG/mL Injectable 0.3 milliGRAM(s) IntraMuscular once PRN  hydrocortisone sodium succinate Injectable 50 milliGRAM(s) IV Push every 12 hours PRN  metoclopramide Injectable 10 milliGRAM(s) IV Push every 6 hours PRN  ondansetron Injectable 8 milliGRAM(s) IV Push every 8 hours PRN      Vital Signs Last 24 Hrs  T(C): 36.7 (03 Jan 2022 05:00), Max: 37.1 (02 Jan 2022 21:12)  T(F): 98.1 (03 Jan 2022 05:00), Max: 98.8 (02 Jan 2022 21:12)  HR: 85 (03 Jan 2022 05:00) (79 - 98)  BP: 117/76 (03 Jan 2022 05:00) (117/76 - 142/97)  BP(mean): --  RR: 18 (03 Jan 2022 05:00) (18 - 18)  SpO2: 96% (03 Jan 2022 05:00) (96% - 100%)    PHYSICAL EXAM  General: adult in NAD  HEENT: clear oropharynx  Neck: supple  CV: normal S1/S2 RRR  Lungs: clear to auscultation, no wheezes, no rales  Abdomen: soft non-tender non-distended  Ext: no edema  Skin: no rash  Neuro: alert and oriented X 4  Central Line: RUE PICC  c/d/i    Cultures:  Culture - Urine (12.30.21 @ 00:57)    Specimen Source: Clean Catch Clean Catch (Midstream)    Culture Results:   No growth    Culture - Blood (12.29.21 @ 21:56)    Specimen Source: .Blood Blood-Peripheral    Culture Results:   No growth to date.    Culture - Blood (12.29.21 @ 21:56)    Specimen Source: .Blood Blood-Catheter    Culture Results:   No growth to date.      LABS:                                          7.6    0.57  )-----------( 15       ( 02 Jan 2022 07:13 )             20.9     02 Jan 2022 07:13    137    |  105    |  16     ----------------------------<  93     4.0     |  18     |  0.88     Ca    8.7        02 Jan 2022 07:13  Phos  3.1       02 Jan 2022 07:13  Mg     1.7       02 Jan 2022 07:13    TPro  6.6    /  Alb  3.6    /  TBili  0.9    /  DBili  x      /  AST  15     /  ALT  33     /  AlkPhos  62     02 Jan 2022 07:13    PT/INR - ( 01 Jan 2022 07:26 )   PT: 14.5 sec;   INR: 1.22 ratio    PTT - ( 01 Jan 2022 07:26 )  PTT:28.2 sec    LIVER FUNCTIONS - ( 02 Jan 2022 07:13 )  Alb: 3.6 g/dL / Pro: 6.6 g/dL / ALK PHOS: 62 U/L / ALT: 33 U/L / AST: 15 U/L / GGT: x           Cyclosporine Level (01.01.22 @ 08:49)    Cyclosporine Level: 230:     RADIOLOGY & ADDITIONAL STUDIES:  from: Xray Chest 1 View- PORTABLE-Urgent (Xray Chest 1 View- PORTABLE-Urgent .) (12.30.21 @ 11:22)   FINDINGS:  Clear lungs. No pleural effusion or pneumothorax.  The heart is normal in size. Right-sided central venous catheter   unchanged prior exam.  No acute osseous findings.    IMPRESSION:  Clear lungs.     Diagnosis: Relapsed aplastic anemia    Protocol/Chemo Regimen:  Rabbit ATG/Cyclosporine/prednisone/Promacta     Day: D8 CSA / Rabbit ATG (D2 suspended due to reaction)     Pt endorsed: No acute complaints     Review of Systems: denies nausea, vomiting, diarrhea, dizziness, chest pain , SOB    Pain scale: 0                                          Diet: regular    Allergies: No Known Allergies      ANTIMICROBIALS  acyclovir   Oral Tab/Cap 400 milliGRAM(s) Oral every 8 hours  cefepime   IVPB 2000 milliGRAM(s) IV Intermittent every 8 hours  fluconAZOLE   Tablet 200 milliGRAM(s) Oral daily      HEME/ONC MEDICATIONS  eltrombopag 150 milliGRAM(s) Oral daily      STANDING MEDICATIONS  Biotene Dry Mouth Oral Rinse 15 milliLiter(s) Swish and Spit four times a day  chlorhexidine 2% Cloths 1 Application(s) Topical <User Schedule>  cycloSPORINE  (SandIMMUNE) 325 milliGRAM(s) Oral every 12 hours  famotidine Injectable 20 milliGRAM(s) IV Push every 12 hours  FIRST- Mouthwash  BLM 10 milliLiter(s) Swish and Spit four times a day  lidocaine 2% Viscous 2 milliLiter(s) Oral four times a day  methylPREDNISolone sodium succinate IVPB 500 milliGRAM(s) IV Intermittent once  pantoprazole    Tablet 40 milliGRAM(s) Oral before breakfast  sodium chloride 0.65% Nasal 1 Spray(s) Both Nostrils four times a day  sodium chloride 0.9%. 1000 milliLiter(s) IV Continuous <Continuous>      PRN MEDICATIONS  acetaminophen     Tablet .. 650 milliGRAM(s) Oral every 6 hours PRN  acetaminophen   IVPB .. 1000 milliGRAM(s) IV Intermittent once PRN  aluminum hydroxide/magnesium hydroxide/simethicone Suspension 30 milliLiter(s) Oral every 4 hours PRN  calcium carbonate    500 mG (Tums) Chewable 1 Tablet(s) Chew every 4 hours PRN  diphenhydrAMINE 25 milliGRAM(s) Oral every 12 hours PRN  diphenhydrAMINE Injectable 25 milliGRAM(s) IV Push once PRN  EPINEPHrine     1 mG/mL Injectable 0.3 milliGRAM(s) IntraMuscular once PRN  hydrocortisone sodium succinate Injectable 50 milliGRAM(s) IV Push every 12 hours PRN  metoclopramide Injectable 10 milliGRAM(s) IV Push every 6 hours PRN  ondansetron Injectable 8 milliGRAM(s) IV Push every 8 hours PRN      Vital Signs Last 24 Hrs  T(C): 36.7 (03 Jan 2022 05:00), Max: 37.1 (02 Jan 2022 21:12)  T(F): 98.1 (03 Jan 2022 05:00), Max: 98.8 (02 Jan 2022 21:12)  HR: 85 (03 Jan 2022 05:00) (79 - 98)  BP: 117/76 (03 Jan 2022 05:00) (117/76 - 142/97)  BP(mean): --  RR: 18 (03 Jan 2022 05:00) (18 - 18)  SpO2: 96% (03 Jan 2022 05:00) (96% - 100%)    PHYSICAL EXAM  General: adult in NAD  HEENT: clear oropharynx  CV: normal S1/S2 RRR  Lungs: clear to auscultation, no wheezes, no rales  Abdomen: soft non-tender non-distended  Ext: no edema  Skin: no rash  Neuro: alert and oriented X 4  Central Line: RUE PICC  c/d/i  LABS:                          7.3    0.66  )-----------( 10       ( 03 Jan 2022 06:34 )             20.3         Mean Cell Volume : 84.9 fl  Mean Cell Hemoglobin : 30.5 pg  Mean Cell Hemoglobin Concentration : 36.0 gm/dL  Auto Neutrophil # : 0.12 K/uL  Auto Lymphocyte # : 0.45 K/uL  Auto Monocyte # : 0.09 K/uL  Auto Eosinophil # : 0.00 K/uL  Auto Basophil # : 0.00 K/uL  Auto Neutrophil % : 18.9 %  Auto Lymphocyte % : 67.6 %  Auto Monocyte % : 13.5 %  Auto Eosinophil % : 0.0 %  Auto Basophil % : 0.0 %      01-03    137  |  105  |  17  ----------------------------<  87  3.9   |  19<L>  |  0.87    Ca    8.8      03 Jan 2022 06:34  Phos  3.7     01-03  Mg     1.6     01-03    TPro  6.7  /  Alb  3.7  /  TBili  1.2  /  DBili  x   /  AST  13  /  ALT  32  /  AlkPhos  58  01-03        PT/INR - ( 03 Jan 2022 06:34 )   PT: 14.3 sec;   INR: 1.20 ratio         PTT - ( 03 Jan 2022 06:34 )  PTT:29.9 sec      Uric Acid 3.4                  Cultures:  Culture - Urine (12.30.21 @ 00:57)    Specimen Source: Clean Catch Clean Catch (Midstream)    Culture Results:   No growth    Culture - Blood (12.29.21 @ 21:56)    Specimen Source: .Blood Blood-Peripheral    Culture Results:   No growth to date.    Culture - Blood (12.29.21 @ 21:56)    Specimen Source: .Blood Blood-Catheter    Culture Results:   No growth to date.                  Cyclosporine Level (01.01.22 @ 08:49)    Cyclosporine Level: 230:     RADIOLOGY & ADDITIONAL STUDIES:  from: Xray Chest 1 View- PORTABLE-Urgent (Xray Chest 1 View- PORTABLE-Urgent .) (12.30.21 @ 11:22)   FINDINGS:  Clear lungs. No pleural effusion or pneumothorax.  The heart is normal in size. Right-sided central venous catheter   unchanged prior exam.  No acute osseous findings.    IMPRESSION:  Clear lungs.     Diagnosis: Relapsed aplastic anemia    Protocol/Chemo Regimen:  Rabbit ATG/Cyclosporine/prednisone/Promacta     Day: D8 CSA / Rabbit ATG day 2  (D2 suspended due to reaction; resumed on 1/3)     Pt endorsed: No acute complaints     Review of Systems: denies nausea, vomiting, diarrhea, dizziness, chest pain , SOB    Pain scale: 0                                          Diet: regular    Allergies: No Known Allergies      ANTIMICROBIALS  acyclovir   Oral Tab/Cap 400 milliGRAM(s) Oral every 8 hours  cefepime   IVPB 2000 milliGRAM(s) IV Intermittent every 8 hours  fluconAZOLE   Tablet 200 milliGRAM(s) Oral daily      HEME/ONC MEDICATIONS  eltrombopag 150 milliGRAM(s) Oral daily      STANDING MEDICATIONS  Biotene Dry Mouth Oral Rinse 15 milliLiter(s) Swish and Spit four times a day  chlorhexidine 2% Cloths 1 Application(s) Topical <User Schedule>  cycloSPORINE  (SandIMMUNE) 325 milliGRAM(s) Oral every 12 hours  famotidine Injectable 20 milliGRAM(s) IV Push every 12 hours  FIRST- Mouthwash  BLM 10 milliLiter(s) Swish and Spit four times a day  lidocaine 2% Viscous 2 milliLiter(s) Oral four times a day  methylPREDNISolone sodium succinate IVPB 500 milliGRAM(s) IV Intermittent once  pantoprazole    Tablet 40 milliGRAM(s) Oral before breakfast  sodium chloride 0.65% Nasal 1 Spray(s) Both Nostrils four times a day  sodium chloride 0.9%. 1000 milliLiter(s) IV Continuous <Continuous>      PRN MEDICATIONS  acetaminophen     Tablet .. 650 milliGRAM(s) Oral every 6 hours PRN  acetaminophen   IVPB .. 1000 milliGRAM(s) IV Intermittent once PRN  aluminum hydroxide/magnesium hydroxide/simethicone Suspension 30 milliLiter(s) Oral every 4 hours PRN  calcium carbonate    500 mG (Tums) Chewable 1 Tablet(s) Chew every 4 hours PRN  diphenhydrAMINE 25 milliGRAM(s) Oral every 12 hours PRN  diphenhydrAMINE Injectable 25 milliGRAM(s) IV Push once PRN  EPINEPHrine     1 mG/mL Injectable 0.3 milliGRAM(s) IntraMuscular once PRN  hydrocortisone sodium succinate Injectable 50 milliGRAM(s) IV Push every 12 hours PRN  metoclopramide Injectable 10 milliGRAM(s) IV Push every 6 hours PRN  ondansetron Injectable 8 milliGRAM(s) IV Push every 8 hours PRN      Vital Signs Last 24 Hrs  T(C): 36.7 (03 Jan 2022 05:00), Max: 37.1 (02 Jan 2022 21:12)  T(F): 98.1 (03 Jan 2022 05:00), Max: 98.8 (02 Jan 2022 21:12)  HR: 85 (03 Jan 2022 05:00) (79 - 98)  BP: 117/76 (03 Jan 2022 05:00) (117/76 - 142/97)  BP(mean): --  RR: 18 (03 Jan 2022 05:00) (18 - 18)  SpO2: 96% (03 Jan 2022 05:00) (96% - 100%)    PHYSICAL EXAM  General: adult in NAD  HEENT: clear oropharynx  CV: normal S1/S2 RRR  Lungs: clear to auscultation, no wheezes, no rales  Abdomen: soft non-tender non-distended  Ext: no edema  Skin: no rash  Neuro: alert and oriented X 4  Central Line: RUE PICC  c/d/i  LABS:                          7.3    0.66  )-----------( 10       ( 03 Jan 2022 06:34 )             20.3         Mean Cell Volume : 84.9 fl  Mean Cell Hemoglobin : 30.5 pg  Mean Cell Hemoglobin Concentration : 36.0 gm/dL  Auto Neutrophil # : 0.12 K/uL  Auto Lymphocyte # : 0.45 K/uL  Auto Monocyte # : 0.09 K/uL  Auto Eosinophil # : 0.00 K/uL  Auto Basophil # : 0.00 K/uL  Auto Neutrophil % : 18.9 %  Auto Lymphocyte % : 67.6 %  Auto Monocyte % : 13.5 %  Auto Eosinophil % : 0.0 %  Auto Basophil % : 0.0 %      01-03    137  |  105  |  17  ----------------------------<  87  3.9   |  19<L>  |  0.87    Ca    8.8      03 Jan 2022 06:34  Phos  3.7     01-03  Mg     1.6     01-03    TPro  6.7  /  Alb  3.7  /  TBili  1.2  /  DBili  x   /  AST  13  /  ALT  32  /  AlkPhos  58  01-03        PT/INR - ( 03 Jan 2022 06:34 )   PT: 14.3 sec;   INR: 1.20 ratio         PTT - ( 03 Jan 2022 06:34 )  PTT:29.9 sec      Uric Acid 3.4      Cultures:  Culture - Urine (12.30.21 @ 00:57)    Specimen Source: Clean Catch Clean Catch (Midstream)    Culture Results:   No growth    Culture - Blood (12.29.21 @ 21:56)    Specimen Source: .Blood Blood-Peripheral    Culture Results:   No growth to date.    Culture - Blood (12.29.21 @ 21:56)    Specimen Source: .Blood Blood-Catheter    Culture Results:   No growth to date.                  Cyclosporine Level (01.01.22 @ 08:49)    Cyclosporine Level: 230:     RADIOLOGY & ADDITIONAL STUDIES:  from: Xray Chest 1 View- PORTABLE-Urgent (Xray Chest 1 View- PORTABLE-Urgent .) (12.30.21 @ 11:22)   FINDINGS:  Clear lungs. No pleural effusion or pneumothorax.  The heart is normal in size. Right-sided central venous catheter   unchanged prior exam.  No acute osseous findings.    IMPRESSION:  Clear lungs.

## 2022-01-03 NOTE — PROGRESS NOTE ADULT - ASSESSMENT
36M with Aplastic Anemia diagnosed April 2020.  Underwent treatment with equine ATG, Cyclosporine, and Promacta.  Went into remission (BM bx 5/2021 with normal morphology and cellularity).  Promacta stopped May 2021.  Cyclosporin stopped August.  By September, platelets started to drop.  Now with pancytopenia and neutropenic fever.  Dental issue?  still complains of severe R posterior gum pain.  CT neck noted as well    Neutropenic fever  - continue cefepime  - would have dental come re-eval the patient  - monitor for further fevers  - if fever recurs, would change diflucan to voriconazole    I have discussed plan of care as detailed above with NP

## 2022-01-03 NOTE — PROGRESS NOTE ADULT - ATTENDING COMMENTS
36M with Aplastic Anemia diagnosed April 2020.  Underwent treatment with equine ATG, Cyclosporine, and Promacta.  Went into remission (BM bx 5/2021 with normal morphology and cellularity).  Promacta stopped May 2021.  Cyclosporin stopped August 2021.  By September, platelets started to drop.  Now with pancytopenia and neutropenic fever.  No obvious focus of infection, however, c/o sorethroat.  On cyclosporine 300 mg BID (started on 12/26/21), first level on 12/29/21 --197. increased dose to 325mg bid, repeat level 230.  Today is day 8    Today: 12/30 tachy and febrile after dose 1 of ATG, EKG shows sinus tachy. Will hold dose 2. Reevaluate tomorrow.  - Febrile on 12/23/21, on cefepime and fluconazole, blood cultures negative, continue cefepime for now  - Nasal MRSA + for staph aureus (not detected though for MRSA). Will start vancomycin.   - Dental consult with x-rays, no dental source of patient's R sided gum pain. CT scan showed some sinus opacification but asymptomatic from that standpoint.  - Discussed with patient about the possibility of going straight to allogeneic transplant, he has 30 year old sister. Have discussed transplant with patient extensively in the past.   - Afebrile now for over 24 hours. Discontinued vancomycin.   - 12/29 : Day 1 :cyclosporine as 300 mg BID (monitor level), rabit ATG, promacta  - Pt decided for rabbit ATG, s/p test dose 12/27 tolerated well. started 12/29 daily x5d, but Held due to infusion reaction with ATG  - prednisone for serum sickness PPX 36M with Aplastic Anemia diagnosed April 2020.  Underwent treatment with equine ATG, Cyclosporine, and Promacta.  Went into remission (BM bx 5/2021 with normal morphology and cellularity).  Promacta stopped May 2021.  Cyclosporin stopped August 2021.  By September, platelets started to drop.  Now with pancytopenia and neutropenic fever.  No obvious focus of infection, however, c/o sorethroat.  On cyclosporine 300 mg BID (started on 12/26/21), first level on 12/29/21 --197. increased dose to 325mg bid, repeat level 230.  Today is day 8    Resume r-ATG.  Continue treatment for AA.    JACKELINE CHEW MD

## 2022-01-03 NOTE — PROGRESS NOTE ADULT - ASSESSMENT
Mr. Olivera is a 37 yo M with Aplastic Anemia diagnosed  April 2020, s/p equine ATG, Cyclosporine, and Promacta. Bone marrow biopsy was done on 5/2021 revealed normal morphology and cellularity, eventually Promacta and Cyclosporine was stopped in 5/2021. Then noted his platelets dropping in September 2021.   Now admitted with neutropenic fever found to have pancytopenia secondary to disease condition.  Bone marrow biopsy was repeated on 12/2021 showed decreased megakaryocytes c/w relapsed disease. Rabbit ATG/ prednisone  started on 12/29/21. Hospital course complicated by ATG reaction, and neutropenic fever. Pt has pancytopenia secondary to chemotherapy and or disease   Mr. Olivera is a 35 yo M with Aplastic Anemia diagnosed  April 2020, s/p equine ATG, Cyclosporine, and Promacta. Bone marrow biopsy was done on 5/2021 revealed normal morphology and cellularity, eventually Promacta and Cyclosporine was stopped in 5/2021. Then noted his platelets dropping in September 2021.   Now admitted with neutropenic fever found to have pancytopenia secondary to disease condition.  Bone marrow biopsy was repeated on 12/2021 showed decreased megakaryocytes c/w relapsed disease. Rabbit ATG/ prednisone  started on 12/29/21; discontinued on 12/30 due to reaction and resumed on 1/3/21. Hospital course complicated by ATG reaction, and neutropenic fever. Pt has pancytopenia secondary to chemotherapy and or disease

## 2022-01-03 NOTE — PROGRESS NOTE ADULT - SUBJECTIVE AND OBJECTIVE BOX
Patient is a 36y old  Male who presents with a chief complaint of nosebleed and fever (22 Dec 2021 14:22)    f/u neutropenic fever    Interval History/ROS:  for ATG today.  no further fever.  feels okay.  gum is better.  no SOB/cough, abdominal pain.  no dysuria.  Remainder of ROS otherwise negative.    PAST MEDICAL & SURGICAL HISTORY:  Aplastic Anemia  Asthma, stable  Redundant prepuce and phimosis  H/O circumcision    Allergies  No Known Allergies    ANTIMICROBIALS:  acyclovir   Oral Tab/Cap 400 every 8 hours  antithymocyte globulin rabbit IVPB 350 daily  atovaquone  Suspension 1500 daily  cefepime   IVPB 2000 every 8 hours  cycloSPORINE  (SandIMMUNE) 325 every 12 hours  fluconAZOLE   Tablet 200 daily    MEDICATIONS  (STANDING):  acetaminophen     Tablet .. 650 daily  antithymocyte globulin rabbit IVPB 350 daily  cycloSPORINE  (SandIMMUNE) 325 every 12 hours  eltrombopag 150 daily  famotidine Injectable 20 every 12 hours  methylPREDNISolone sodium succinate IVPB 500 once  pantoprazole    Tablet 40 before breakfast  predniSONE   Tablet 100 daily    Vital Signs Last 24 Hrs  T(F): 98.4 (22 @ 09:25), Max: 98.8 (22 @ 21:12)  HR: 79 (22 @ 09:25)  BP: 122/77 (22 @ 09:25)  RR: 18 (22 @ 09:25)  SpO2: 98% (22 @ 09:25) (96% - 100%)  Wt(kg): --    PHYSICAL EXAM:  Constitutional: non-toxic  HEAD/EYES: anicteric  ENT:  supple  Cardiovascular:   normal S1, S2  Respiratory:  clear BS bilaterally  GI:  soft, non-tender, normal bowel sounds  :  no richter  Musculoskeletal:  no synovitis,  Neurologic: awake and alert, normal strength, no focal findings  Skin:  several small ecchymoses, L forearm, early phlebitis?  Psychiatric:  awake, alert, appropriate mood                                 7.3    0.66  )-----------( 10       ( 2022 06:34 )             20.3     137  |  105  |  17  ----------------------------<  87  3.9   |  19  |  0.87  Ca    8.8      2022 06:34Phos  3.7     -Mg     1.6       TPro  6.7  /  Alb  3.7  /  TBili  1.2  /  DBili  x   /  AST  13  /  ALT  32  /  AlkPhos  58      Urinalysis Basic - ( 21 Dec 2021 20:53 )  Color: Yellow / Appearance: Clear / S.034 / pH: x  Gluc: x / Ketone: Negative  / Bili: Negative / Urobili: Negative   Blood: x / Protein: 30 mg/dL / Nitrite: Negative   Leuk Esterase: Negative / RBC: 2 /hpf / WBC 1 /HPF   Sq Epi: x / Non Sq Epi: 0 /hpf / Bacteria: Negative    MICROBIOLOGY:  Culture - Urine (collected 21 @ 00:57)  Source: Clean Catch Clean Catch (Midstream)  Final Report (21 @ 22:38):    No growth    Culture - Blood (collected 21 @ 21:56)  Source: .Blood Blood-Peripheral  Preliminary Report (21 @ 22:02):    No growth to date.    Culture - Blood (collected 21 @ 21:56)  Source: .Blood Blood-Catheter  Preliminary Report (21 @ 22:02):    No growth to date.    Culture - Group A Streptococcus (collected 21 @ 17:32)  Source: .Throat Throat  Final Report (21 @ 08:04):    No Streptococcus pyogenes (Group A) isolated    Culture - Group A Streptococcus (collected 21 @ 14:58)  Source: .Throat Throat  Final Report (21 @ 08:00):    No Streptococcus pyogenes (Group A) isolated    Culture - Blood (collected 21 @ 09:16)  Source: .Blood Blood-Venous  Preliminary Report (21 @ 10:01):    No growth to date.    Culture - Blood (collected 21 @ 09:16)  Source: .Blood Blood-Peripheral  Preliminary Report (21 @ 10:01):    No growth to date.    Culture - Urine (collected 21 @ 01:14)  Source: Clean Catch Clean Catch (Midstream)  Final Report (21 @ 21:12):    No growth    Culture - Blood (collected 21 @ 17:15)  Source: .Blood Blood  Final Report (21 @ 18:00):    No Growth Final    Culture - Blood (collected 21 @ 17:15)  Source: .Blood Blood  Final Report (21 @ 18:00):    No Growth Final    Rapid RVP Result: NotDetec ( @ 13:37)    RADIOLOGY:  imaging below personally reviewed and agree with findings    CT Neck Soft Tissue w/wo IV Cont (21 @ 12:00) >  IMPRESSION: Moderate mucosal thickening within the LEFT maxillary, BILATERAL ethmoid and frontal sinuses. Minimal increased density seen within the BILATERAL frontal sinuses which may reflect inspissated secretions or possibly fungal material, less likely hemorrhage.   Minimal reactive lymph nodes noted..    CT Abdomen and Pelvis w/ IV Cont (21 @ 20:04) >  IMPRESSION: No CT evidence for source of infection in the chest, abdomen or pelvis.     Xray Chest 1 View- PORTABLE-Urgent (Xray Chest 1 View- PORTABLE-Urgent .) (21 @ 14:08) >  IMPRESSION:  No active pulmonary disease.

## 2022-01-03 NOTE — PROGRESS NOTE ADULT - PROBLEM SELECTOR PLAN 1
Relapsed Aplastic Anemia   s/p hATG, CSA, and promacta with complete remission in 2020  BM bx on 11/9/20 revealed hypocellular marrow.  BMbx 12/2021 showing erythroid predominance, myeloid and erythroid maturation, and markedly decreased megakaryocytes c/w relapse  unable to receive Promacta outpatient x~10 days. Sent script to [facility] on 12/22 12/23-   transferred  to [facility] for rATG treatment  Monitor CBC with diff/lytes, transfuse and or replete PRN  Monitor weight, I and O, mouth care  12/26- Started Cyclosporine 300 mg PO Q 12 hrs   12/27 test dose - no reaction noted  12/28 PICC placed  12/29 Started  Rabbit ATG/prednisone  12/29 SCA level 197, dose increased to 325 PO BID, follow up CSA level on Saturday 1/1/22 = 230  12/29 Reaction to rATG- Benadryl, Hydrocortisone given   HELD on 12/30 due to reaction ; Continue promacta  Fever 12/30 pm, cultures negative  1/3 discuss restarting rabbit ATG Relapsed Aplastic Anemia   s/p hATG, CSA, and promacta with complete remission in 2020  BM bx on 11/9/20 revealed hypocellular marrow.  BMbx 12/2021 showing erythroid predominance, myeloid and erythroid maturation, and markedly decreased megakaryocytes c/w relapse  unable to receive Promacta outpatient x~10 days. Sent script to [facility] on 12/22 12/23-   transferred  to [facility] for rATG treatment  Monitor CBC with diff/lytes, transfuse and or replete PRN  Monitor weight, I and O, mouth care  12/26- Started Cyclosporine 300 mg PO Q 12 hrs   12/27 test dose - no reaction noted  12/28 PICC placed  12/29 Started  Rabbit ATG/prednisone  12/29 SCA level 197, dose increased to 325 PO BID, follow up CSA level on Saturday 1/1/22 = 230  12/29 Reaction to rATG- Benadryl, Hydrocortisone given   HELD on 12/30 due to reaction ; Continue promacta  1/3 discuss restarting rabbit ATG Relapsed Aplastic Anemia   s/p hATG, CSA, and promacta with complete remission in 2020  BM bx on 11/9/20 revealed hypocellular marrow.  BMbx 12/2021 showing erythroid predominance, myeloid and erythroid maturation, and markedly decreased megakaryocytes c/w relapse  unable to receive Promacta outpatient x~10 days. Sent script to [facility] on 12/22 12/23-   transferred  to [facility] for rATG treatment  Monitor CBC with diff/lytes, transfuse and or replete PRN  Monitor weight, I and O, mouth care  12/26- Started Cyclosporine 300 mg PO Q 12 hrs   12/27 test dose - no reaction noted  12/28 PICC placed  12/29 Started  Rabbit ATG/prednisone  12/29 SCA level 197, dose increased to 325 PO BID, follow up CSA level on Saturday 1/1/22 = 230  12/29 Reaction to rATG- Benadryl, Hydrocortisone given   HELD on 12/30 due to reaction ; Continue promacta  1/3 Resumed  rabbit ATG/prednisone Relapsed Aplastic Anemia   s/p hATG, CSA, and promacta with complete remission in 2020  BM bx on 11/9/20 revealed hypocellular marrow.  BMbx 12/2021 showing erythroid predominance, myeloid and erythroid maturation, and markedly decreased megakaryocytes c/w relapse  unable to receive Promacta outpatient x~10 days. Sent script to [facility] on 12/22 12/23-   transferred  to [facility] for rATG treatment  Monitor CBC with diff/lytes, transfuse and or replete PRN  Monitor weight, I and O, mouth care  12/26- Started Cyclosporine 300 mg PO Q 12 hrs   12/27 test dose - no reaction noted  12/28 PICC placed  12/29 Started  Rabbit ATG/prednisone  12/29 SCA level 197, dose increased to 325 PO BID,  CSA level on Saturday 1/1/22 = 230  12/29 Reaction to rATG- Benadryl, Hydrocortisone given   HELD on 12/30 due to reaction ; Continue promacta  1/3 Resumed  rabbit ATG/prednisone  1/4 Follow up CSA level

## 2022-01-04 ENCOUNTER — APPOINTMENT (OUTPATIENT)
Dept: INFUSION THERAPY | Facility: HOSPITAL | Age: 37
End: 2022-01-04

## 2022-01-04 ENCOUNTER — APPOINTMENT (OUTPATIENT)
Dept: HEMATOLOGY ONCOLOGY | Facility: CLINIC | Age: 37
End: 2022-01-04

## 2022-01-04 LAB
ALBUMIN SERPL ELPH-MCNC: 4.2 G/DL — SIGNIFICANT CHANGE UP (ref 3.3–5)
ALP SERPL-CCNC: 70 U/L — SIGNIFICANT CHANGE UP (ref 40–120)
ALT FLD-CCNC: 92 U/L — HIGH (ref 10–45)
ANION GAP SERPL CALC-SCNC: 16 MMOL/L — SIGNIFICANT CHANGE UP (ref 5–17)
APTT BLD: 29.9 SEC — SIGNIFICANT CHANGE UP (ref 27.5–35.5)
AST SERPL-CCNC: 53 U/L — HIGH (ref 10–40)
BILIRUB SERPL-MCNC: 1.2 MG/DL — SIGNIFICANT CHANGE UP (ref 0.2–1.2)
BUN SERPL-MCNC: 23 MG/DL — SIGNIFICANT CHANGE UP (ref 7–23)
CALCIUM SERPL-MCNC: 9.1 MG/DL — SIGNIFICANT CHANGE UP (ref 8.4–10.5)
CHLORIDE SERPL-SCNC: 105 MMOL/L — SIGNIFICANT CHANGE UP (ref 96–108)
CO2 SERPL-SCNC: 17 MMOL/L — LOW (ref 22–31)
CREAT SERPL-MCNC: 0.78 MG/DL — SIGNIFICANT CHANGE UP (ref 0.5–1.3)
CYCLOSPORINE SER-MCNC: 276 NG/ML — SIGNIFICANT CHANGE UP (ref 150–400)
FIBRINOGEN PPP-MCNC: 836 MG/DL — HIGH (ref 290–520)
GLUCOSE SERPL-MCNC: 137 MG/DL — HIGH (ref 70–99)
HCT VFR BLD CALC: 21.6 % — LOW (ref 39–50)
HGB BLD-MCNC: 7.8 G/DL — LOW (ref 13–17)
INR BLD: 1.18 RATIO — HIGH (ref 0.88–1.16)
LDH SERPL L TO P-CCNC: 237 U/L — SIGNIFICANT CHANGE UP (ref 50–242)
MAGNESIUM SERPL-MCNC: 1.7 MG/DL — SIGNIFICANT CHANGE UP (ref 1.6–2.6)
MCHC RBC-ENTMCNC: 30.2 PG — SIGNIFICANT CHANGE UP (ref 27–34)
MCHC RBC-ENTMCNC: 36.1 GM/DL — HIGH (ref 32–36)
MCV RBC AUTO: 83.7 FL — SIGNIFICANT CHANGE UP (ref 80–100)
PHOSPHATE SERPL-MCNC: 3.5 MG/DL — SIGNIFICANT CHANGE UP (ref 2.5–4.5)
PLATELET # BLD AUTO: 7 K/UL — CRITICAL LOW (ref 150–400)
POTASSIUM SERPL-MCNC: 4.6 MMOL/L — SIGNIFICANT CHANGE UP (ref 3.5–5.3)
POTASSIUM SERPL-SCNC: 4.6 MMOL/L — SIGNIFICANT CHANGE UP (ref 3.5–5.3)
PROT SERPL-MCNC: 7.4 G/DL — SIGNIFICANT CHANGE UP (ref 6–8.3)
PROTHROM AB SERPL-ACNC: 14 SEC — HIGH (ref 10.6–13.6)
RBC # BLD: 2.58 M/UL — LOW (ref 4.2–5.8)
RBC # FLD: 12.8 % — SIGNIFICANT CHANGE UP (ref 10.3–14.5)
SODIUM SERPL-SCNC: 138 MMOL/L — SIGNIFICANT CHANGE UP (ref 135–145)
URATE SERPL-MCNC: 2.9 MG/DL — LOW (ref 3.4–8.8)
WBC # BLD: 0.15 K/UL — CRITICAL LOW (ref 3.8–10.5)
WBC # FLD AUTO: 0.15 K/UL — CRITICAL LOW (ref 3.8–10.5)

## 2022-01-04 PROCEDURE — 99232 SBSQ HOSP IP/OBS MODERATE 35: CPT

## 2022-01-04 RX ORDER — FAMOTIDINE 10 MG/ML
20 INJECTION INTRAVENOUS DAILY
Refills: 0 | Status: DISCONTINUED | OUTPATIENT
Start: 2022-01-04 | End: 2022-01-20

## 2022-01-04 RX ADMIN — CEFEPIME 100 MILLIGRAM(S): 1 INJECTION, POWDER, FOR SOLUTION INTRAMUSCULAR; INTRAVENOUS at 13:06

## 2022-01-04 RX ADMIN — Medication 15 MILLILITER(S): at 17:27

## 2022-01-04 RX ADMIN — Medication 15 MILLILITER(S): at 13:05

## 2022-01-04 RX ADMIN — Medication 50 MILLIGRAM(S): at 12:07

## 2022-01-04 RX ADMIN — Medication 15 MILLILITER(S): at 05:19

## 2022-01-04 RX ADMIN — Medication 400 MILLIGRAM(S): at 16:07

## 2022-01-04 RX ADMIN — CEFEPIME 100 MILLIGRAM(S): 1 INJECTION, POWDER, FOR SOLUTION INTRAMUSCULAR; INTRAVENOUS at 22:50

## 2022-01-04 RX ADMIN — CYCLOSPORINE 325 MILLIGRAM(S): 100 CAPSULE ORAL at 17:11

## 2022-01-04 RX ADMIN — Medication 15 MILLILITER(S): at 00:10

## 2022-01-04 RX ADMIN — Medication 650 MILLIGRAM(S): at 12:06

## 2022-01-04 RX ADMIN — ATOVAQUONE 1500 MILLIGRAM(S): 750 SUSPENSION ORAL at 16:10

## 2022-01-04 RX ADMIN — Medication 25 MILLIGRAM(S): at 12:06

## 2022-01-04 RX ADMIN — Medication 100 MILLIGRAM(S): at 16:14

## 2022-01-04 RX ADMIN — FAMOTIDINE 20 MILLIGRAM(S): 10 INJECTION INTRAVENOUS at 13:16

## 2022-01-04 RX ADMIN — ELTROMBOPAG OLAMINE 150 MILLIGRAM(S): 50 TABLET, FILM COATED ORAL at 16:12

## 2022-01-04 RX ADMIN — Medication 50 MILLIGRAM(S): at 16:11

## 2022-01-04 RX ADMIN — CEFEPIME 100 MILLIGRAM(S): 1 INJECTION, POWDER, FOR SOLUTION INTRAMUSCULAR; INTRAVENOUS at 05:18

## 2022-01-04 RX ADMIN — Medication 400 MILLIGRAM(S): at 05:15

## 2022-01-04 RX ADMIN — Medication 650 MILLIGRAM(S): at 12:30

## 2022-01-04 RX ADMIN — Medication 400 MILLIGRAM(S): at 22:50

## 2022-01-04 RX ADMIN — FAMOTIDINE 20 MILLIGRAM(S): 10 INJECTION INTRAVENOUS at 00:10

## 2022-01-04 RX ADMIN — CHLORHEXIDINE GLUCONATE 1 APPLICATION(S): 213 SOLUTION TOPICAL at 13:08

## 2022-01-04 RX ADMIN — CYCLOSPORINE 325 MILLIGRAM(S): 100 CAPSULE ORAL at 05:15

## 2022-01-04 RX ADMIN — FLUCONAZOLE 200 MILLIGRAM(S): 150 TABLET ORAL at 13:10

## 2022-01-04 RX ADMIN — PANTOPRAZOLE SODIUM 40 MILLIGRAM(S): 20 TABLET, DELAYED RELEASE ORAL at 05:15

## 2022-01-04 NOTE — DIETITIAN INITIAL EVALUATION ADULT. - PERSON TAUGHT/METHOD
RD provided education regarding importance of increasing protein and calories intake, recommended strategies and available food options. Pt demonstrated understanding/verbal instruction/patient instructed Importance of increasing protein and calories intake. Recommended strategies and available food options. Pt demonstrated understanding/verbal instruction/patient instructed

## 2022-01-04 NOTE — PROGRESS NOTE ADULT - ASSESSMENT
Mr. Olivera is a 35 yo M with Aplastic Anemia diagnosed  April 2020, s/p equine ATG, Cyclosporine, and Promacta. Bone marrow biopsy was done on 5/2021 revealed normal morphology and cellularity, eventually Promacta and Cyclosporine was stopped in 5/2021. Then noted his platelets dropping in September 2021.   Now admitted with neutropenic fever found to have pancytopenia secondary to disease condition.  Bone marrow biopsy was repeated on 12/2021 showed decreased megakaryocytes c/w relapsed disease. Rabbit ATG/ prednisone  started on 12/29/21; discontinued on 12/30 due to reaction and resumed on 1/3/21. Hospital course complicated by ATG reaction, and neutropenic fever. Pt has pancytopenia secondary to chemotherapy and or disease

## 2022-01-04 NOTE — DIETITIAN INITIAL EVALUATION ADULT. - ADD RECOMMEND
1. Continue current diet as ordered/tolerated 2. Add Ensure Enlive x1/day 3. Encourage adequate PO intake of meals/supplements 3. Continue to monitor labs, skin integrity, GI distress, intake and tolerance 4. Reinforce education as needed 1. Continue current diet as ordered/tolerated 2. Add Ensure Enlive x1/day 3. Encourage adequate PO intake of meals/supplements 3. Continue to monitor labs, skin integrity, GI distress, intake and tolerance 4. Reinforce education as needed 5. Malnutrition notification placed

## 2022-01-04 NOTE — DIETITIAN INITIAL EVALUATION ADULT. - MALNUTRITION
Moderate malnutrition in the setting of acute illness Moderate Mild malnutrition in the setting of acute illness Mild

## 2022-01-04 NOTE — DIETITIAN INITIAL EVALUATION ADULT. - SIGNS/SYMPTOMS
as evidenced by 1.2% weight loss x2 weeks and ~50% of PO intake >7 days as evidenced by 1.2% weight loss x2 weeks and suspected <75% of EER

## 2022-01-04 NOTE — PROGRESS NOTE ADULT - PROBLEM SELECTOR PLAN 1
Relapsed Aplastic Anemia   s/p hATG, CSA, and promacta with complete remission in 2020  BM bx on 11/9/20 revealed hypocellular marrow.  BMbx 12/2021 showing erythroid predominance, myeloid and erythroid maturation, and markedly decreased megakaryocytes c/w relapse  unable to receive Promacta outpatient x~10 days. Sent script to [facility] on 12/22 12/23-   transferred  to [facility] for rATG treatment  Monitor CBC with diff/lytes, transfuse and or replete PRN  Monitor weight, I and O, mouth care  12/26- Started Cyclosporine 300 mg PO Q 12 hrs   12/27 test dose - no reaction noted  12/28 PICC placed  12/29 Started  Rabbit ATG/prednisone  12/29 SCA level 197, dose increased to 325 PO BID,  CSA level on Saturday 1/1/22 = 230  12/29 Reaction to rATG- Benadryl, Hydrocortisone given   HELD on 12/30 due to reaction ; Continue promacta  1/3 Resumed  rabbit ATG/prednisone  1/4 Follow up CSA level Relapsed Aplastic Anemia   s/p hATG, CSA, and promacta with complete remission in 2020, BM bx on 11/9/20 revealed hypocellular marrow.  BMbx 12/2021 showing erythroid predominance, myeloid and erythroid maturation, and markedly decreased megakaryocytes c/w relapse  12/23-   transferred  to [facility] for rATG treatment  Monitor CBC with diff/lytes, transfuse and or replete PRN, Monitor weight, I and O, mouth care  12/26- Started Cyclosporine 300 mg PO Q 12 hrs , 12/27 test dose - no reaction noted  12/28 PICC placed  12/29 Started  Rabbit ATG/prednisone-Reaction to rATG- HELD on 12/30 due to reaction ; Continue promacta  1/3 Resumed  rabbit ATG/prednisone  Thrombocytopenia- Replace plt

## 2022-01-04 NOTE — PROGRESS NOTE ADULT - SUBJECTIVE AND OBJECTIVE BOX
Patient is a 36y old  Male who presents with a chief complaint of nosebleed and fever (22 Dec 2021 14:22)    f/u neutropenic fever    Interval History/ROS:  c/o yellowing of the eyes.  no n/v/d.  gum is better.  no SOB/cough, no dysuria.  Remainder of ROS otherwise negative.    PAST MEDICAL & SURGICAL HISTORY:  Aplastic Anemia  Asthma, stable  Redundant prepuce and phimosis  H/O circumcision    Allergies  No Known Allergies    ANTIMICROBIALS:  acyclovir   Oral Tab/Cap 400 every 8 hours  antithymocyte globulin rabbit IVPB 350 daily  atovaquone  Suspension 1500 daily  cefepime   IVPB 2000 every 8 hours  cycloSPORINE  (SandIMMUNE) 325 every 12 hours  fluconAZOLE   Tablet 200 daily    MEDICATIONS  (STANDING):  acetaminophen     Tablet .. 650 daily  antithymocyte globulin rabbit IVPB 350 daily  cycloSPORINE  (SandIMMUNE) 325 every 12 hours  diphenhydrAMINE Injectable 50 daily  eltrombopag 150 daily  famotidine Injectable 20 every 12 hours  methylPREDNISolone sodium succinate IVPB 500 once  pantoprazole    Tablet 40 before breakfast  predniSONE   Tablet 100 daily    Vital Signs Last 24 Hrs  T(F): 97.8 (22 @ 09:00), Max: 98.6 (22 @ 21:00)  HR: 91 (22 @ 09:00)  BP: 138/91 (22 @ 09:00)  RR: 18 (22 @ 09:00)  SpO2: 98% (22 @ 09:00) (94% - 98%)  Wt(kg): --    PHYSICAL EXAM:  Constitutional: non-toxic  HEAD/EYES: anicteric  ENT:  supple  Cardiovascular:   normal S1, S2  Respiratory:  clear BS bilaterally  GI:  soft, non-tender, normal bowel sounds  :  no richter  Musculoskeletal:  no synovitis,  Neurologic: awake and alert, normal strength, no focal findings  Psychiatric:  awake, alert, appropriate mood                           7.8    0.15  )-----------( 7        ( 2022 06:50 )             21.6     138  |  105  |  23  ----------------------------<  137  4.6   |  17  |  0.78  Ca    9.1      2022 06:50Phos  3.5     Mg     1.7       TPro  7.4  /  Alb  4.2  /  TBili  1.2  /  DBili  x   /  AST  53  /  ALT  92  /  AlkPhos  70      Bilirubin Total, Serum: 1.2 mg/dL (22 @ 06:50)  Bilirubin Total, Serum: 1.2 mg/dL (22 @ 06:34)  Bilirubin Total, Serum: 0.9 mg/dL (22 @ 07:13)  Bilirubin Total, Serum: 0.8 mg/dL (22 @ 07:23)  Bilirubin Total, Serum: 0.7 mg/dL (21 @ 07:31)  Bilirubin Total, Serum: 0.6 mg/dL (21 @ 07:09)  Bilirubin Total, Serum: 0.4 mg/dL (21 @ 07:38)    Alanine Aminotransferase (ALT/SGPT): 92 U/L (22 @ 06:50)  Alanine Aminotransferase (ALT/SGPT): 32 U/L (22 @ 06:34)  Alanine Aminotransferase (ALT/SGPT): 33 U/L (22 @ 07:13)  Alanine Aminotransferase (ALT/SGPT): 26 U/L (22 @ 07:23)  Alanine Aminotransferase (ALT/SGPT): 24 U/L (21 @ 07:31)    Urinalysis Basic - ( 21 Dec 2021 20:53 )  Color: Yellow / Appearance: Clear / S.034 / pH: x  Gluc: x / Ketone: Negative  / Bili: Negative / Urobili: Negative   Blood: x / Protein: 30 mg/dL / Nitrite: Negative   Leuk Esterase: Negative / RBC: 2 /hpf / WBC 1 /HPF   Sq Epi: x / Non Sq Epi: 0 /hpf / Bacteria: Negative    MICROBIOLOGY:  Culture - Urine (collected 21 @ 00:57)  Source: Clean Catch Clean Catch (Midstream)  Final Report (21 @ 22:38):    No growth    Culture - Blood (collected 21 @ 21:56)  Source: .Blood Blood-Peripheral  Preliminary Report (21 @ 22:02):    No growth to date.    Culture - Blood (collected 21 @ 21:56)  Source: .Blood Blood-Catheter  Preliminary Report (21 @ 22:02):    No growth to date.    Culture - Group A Streptococcus (collected 21 @ 17:32)  Source: .Throat Throat  Final Report (21 @ 08:04):    No Streptococcus pyogenes (Group A) isolated    Culture - Group A Streptococcus (collected 21 @ 14:58)  Source: .Throat Throat  Final Report (21 @ 08:00):    No Streptococcus pyogenes (Group A) isolated    Culture - Blood (collected 21 @ 09:16)  Source: .Blood Blood-Venous  Preliminary Report (21 @ 10:01):    No growth to date.    Culture - Blood (collected 21 @ 09:16)  Source: .Blood Blood-Peripheral  Preliminary Report (21 @ 10:01):    No growth to date.    Culture - Urine (collected 21 @ 01:14)  Source: Clean Catch Clean Catch (Midstream)  Final Report (21 @ 21:12):    No growth    Culture - Blood (collected 21 @ 17:15)  Source: .Blood Blood  Final Report (21 @ 18:00):    No Growth Final    Culture - Blood (collected 21 @ 17:15)  Source: .Blood Blood  Final Report (21 @ 18:00):    No Growth Final    Rapid RVP Result: NotDetec ( @ 13:37)    RADIOLOGY:  imaging below personally reviewed and agree with findings    CT Neck Soft Tissue w/wo IV Cont (21 @ 12:00) >  IMPRESSION: Moderate mucosal thickening within the LEFT maxillary, BILATERAL ethmoid and frontal sinuses. Minimal increased density seen within the BILATERAL frontal sinuses which may reflect inspissated secretions or possibly fungal material, less likely hemorrhage.   Minimal reactive lymph nodes noted..    CT Abdomen and Pelvis w/ IV Cont (21 @ 20:04) >  IMPRESSION: No CT evidence for source of infection in the chest, abdomen or pelvis.     Xray Chest 1 View- PORTABLE-Urgent (Xray Chest 1 View- PORTABLE-Urgent .) (21 @ 14:08) >  IMPRESSION:  No active pulmonary disease.     Patient is a 36y old  Male who presents with a chief complaint of nosebleed and fever (22 Dec 2021 14:22)    f/u neutropenic fever    Interval History/ROS:  c/o yellowing of the eyes.  no n/v/d.  gum is better.  no SOB/cough, no dysuria.  Remainder of ROS otherwise negative.    PAST MEDICAL & SURGICAL HISTORY:  Aplastic Anemia  Asthma, stable  Redundant prepuce and phimosis  H/O circumcision    Allergies  No Known Allergies    ANTIMICROBIALS:  acyclovir   Oral Tab/Cap 400 every 8 hours  antithymocyte globulin rabbit IVPB 350 daily  atovaquone  Suspension 1500 daily  cefepime   IVPB 2000 every 8 hours  cycloSPORINE  (SandIMMUNE) 325 every 12 hours  fluconAZOLE   Tablet 200 daily    MEDICATIONS  (STANDING):  acetaminophen     Tablet .. 650 daily  antithymocyte globulin rabbit IVPB 350 daily  cycloSPORINE  (SandIMMUNE) 325 every 12 hours  diphenhydrAMINE Injectable 50 daily  eltrombopag 150 daily  famotidine Injectable 20 every 12 hours  methylPREDNISolone sodium succinate IVPB 500 once  pantoprazole    Tablet 40 before breakfast  predniSONE   Tablet 100 daily    Vital Signs Last 24 Hrs  T(F): 97.8 (22 @ 09:00), Max: 98.6 (22 @ 21:00)  HR: 91 (22 @ 09:00)  BP: 138/91 (22 @ 09:00)  RR: 18 (22 @ 09:00)  SpO2: 98% (22 @ 09:00) (94% - 98%)  Wt(kg): --    PHYSICAL EXAM:  Constitutional: non-toxic  HEAD/EYES: mild icterus  ENT:  supple  Cardiovascular:   normal S1, S2  Respiratory:  clear BS bilaterally  GI:  soft, non-tender, normal bowel sounds  :  no richter  Musculoskeletal:  no synovitis,  Neurologic: awake and alert, normal strength, no focal findings  Psychiatric:  awake, alert, appropriate mood                           7.8    0.15  )-----------( 7        ( 2022 06:50 )             21.6     138  |  105  |  23  ----------------------------<  137  4.6   |  17  |  0.78  Ca    9.1      2022 06:50Phos  3.5     Mg     1.7       TPro  7.4  /  Alb  4.2  /  TBili  1.2  /  DBili  x   /  AST  53  /  ALT  92  /  AlkPhos  70      Bilirubin Total, Serum: 1.2 mg/dL (22 @ 06:50)  Bilirubin Total, Serum: 1.2 mg/dL (22 @ 06:34)  Bilirubin Total, Serum: 0.9 mg/dL (22 @ 07:13)  Bilirubin Total, Serum: 0.8 mg/dL (22 @ 07:23)  Bilirubin Total, Serum: 0.7 mg/dL (21 @ 07:31)  Bilirubin Total, Serum: 0.6 mg/dL (21 @ 07:09)  Bilirubin Total, Serum: 0.4 mg/dL (21 @ 07:38)    Alanine Aminotransferase (ALT/SGPT): 92 U/L (22 @ 06:50)  Alanine Aminotransferase (ALT/SGPT): 32 U/L (22 @ 06:34)  Alanine Aminotransferase (ALT/SGPT): 33 U/L (22 @ 07:13)  Alanine Aminotransferase (ALT/SGPT): 26 U/L (22 @ 07:23)  Alanine Aminotransferase (ALT/SGPT): 24 U/L (21 @ 07:31)    Urinalysis Basic - ( 21 Dec 2021 20:53 )  Color: Yellow / Appearance: Clear / S.034 / pH: x  Gluc: x / Ketone: Negative  / Bili: Negative / Urobili: Negative   Blood: x / Protein: 30 mg/dL / Nitrite: Negative   Leuk Esterase: Negative / RBC: 2 /hpf / WBC 1 /HPF   Sq Epi: x / Non Sq Epi: 0 /hpf / Bacteria: Negative    MICROBIOLOGY:  Culture - Urine (collected 21 @ 00:57)  Source: Clean Catch Clean Catch (Midstream)  Final Report (21 @ 22:38):    No growth    Culture - Blood (collected 21 @ 21:56)  Source: .Blood Blood-Peripheral  Preliminary Report (21 @ 22:02):    No growth to date.    Culture - Blood (collected 21 @ 21:56)  Source: .Blood Blood-Catheter  Preliminary Report (21 @ 22:02):    No growth to date.    Culture - Group A Streptococcus (collected 21 @ 17:32)  Source: .Throat Throat  Final Report (21 @ 08:04):    No Streptococcus pyogenes (Group A) isolated    Culture - Group A Streptococcus (collected 21 @ 14:58)  Source: .Throat Throat  Final Report (21 @ 08:00):    No Streptococcus pyogenes (Group A) isolated    Culture - Blood (collected 21 @ 09:16)  Source: .Blood Blood-Venous  Preliminary Report (21 @ 10:01):    No growth to date.    Culture - Blood (collected 21 @ 09:16)  Source: .Blood Blood-Peripheral  Preliminary Report (21 @ 10:01):    No growth to date.    Culture - Urine (collected 21 @ 01:14)  Source: Clean Catch Clean Catch (Midstream)  Final Report (21 @ 21:12):    No growth    Culture - Blood (collected 21 @ 17:15)  Source: .Blood Blood  Final Report (21 @ 18:00):    No Growth Final    Culture - Blood (collected 21 @ 17:15)  Source: .Blood Blood  Final Report (21 @ 18:00):    No Growth Final    Rapid RVP Result: NotDetec ( @ 13:37)    RADIOLOGY:  imaging below personally reviewed and agree with findings    CT Neck Soft Tissue w/wo IV Cont (21 @ 12:00) >  IMPRESSION: Moderate mucosal thickening within the LEFT maxillary, BILATERAL ethmoid and frontal sinuses. Minimal increased density seen within the BILATERAL frontal sinuses which may reflect inspissated secretions or possibly fungal material, less likely hemorrhage.   Minimal reactive lymph nodes noted..    CT Abdomen and Pelvis w/ IV Cont (21 @ 20:04) >  IMPRESSION: No CT evidence for source of infection in the chest, abdomen or pelvis.     Xray Chest 1 View- PORTABLE-Urgent (Xray Chest 1 View- PORTABLE-Urgent .) (21 @ 14:08) >  IMPRESSION:  No active pulmonary disease.

## 2022-01-04 NOTE — DIETITIAN INITIAL EVALUATION ADULT. - OTHER INFO
Reports good appetite and ~50% of PO intake - per flowsheet, consumes 25% of meals 1/3. Compared to PTA, pt states decreased appetite and PO intake, however, couldn't specify the cause. Pt amenable to adding Ensure Enlive (250cal/9 g pro) x1/day, declined any other food items offered.     Denies chewing or swallowing problems. No N/V/C/D with last BM 1/3 per flowsheet - currently on Reglan, Zofran and Maalox.     Reports UBW of 225 pounds before admission 12/21, weighs 222.4 pounds as of 1/3 - 2.6 (1.2%) weight loss x2 weeks. NFPE shows no muscle or fat loss. Per ASPEN guidelines, pt meets the criteria of moderate malnutrition in the setting of acute illness. Reports fair appetite and ~50% of PO intake - per flowsheet, consumes 25% of meals 1/3. Compared to PTA, pt states decreased appetite and PO intake, however, couldn't specify the cause. Pt amenable to adding Ensure Enlive (350cal/20 g pro) x1/day, declined any other food items offered.     Denies chewing or swallowing problems. No N/V/C/D with last BM 1/3 per flowsheet - currently on Reglan, Zofran and Maalox.     Reports UBW of 225 pounds before admission 12/21. Since admission, weights have been trending down as per flowsheet: 228.3 pounds (12/30), 227.9 pounds (1/1), 225.3 pounds (1/2), 222.4 pounds (1/3) and 222.2 pounds (1/4) - 2.6 (1.2%) weight loss x2 weeks. NFPE shows no muscle or fat loss. Per ASPEN guidelines, pt meets the criteria of mild malnutrition in the setting of acute illness.

## 2022-01-04 NOTE — PROGRESS NOTE ADULT - PROBLEM SELECTOR PLAN 2
Neutropenic, afebrile   continue cefepime , diflucan, Acyclovir for now  throat culture (-).  MRSA (-), staph aureus PCR +.  CT CAP  to look for source of infection- No CT evidence for source of infection in the chest, abdomen or pelvis.  12/24- Staph aureus PCR detected, will start Vanco 1 gm Q 12 hrs.  12/26- Vanco discontinued on 12/26 as patient continue to be afebrile and CT maxillo facial did not reveal any abscess.  1/3 Started Mepron for ppx  ID following Neutropenic, afebrile   continue cefepime , diflucan, Acyclovir for now  throat culture (-). MRSA (-)  CT CAP  to look for source of infection- No CT evidence for source of infection in the chest, abdomen or pelvis.  12/24- Staph aureus PCR detected, will start Vanco 1 gm Q 12 hrs stopped 12/26. CT maxillo facial did not reveal any abscess.   1/3 Started Mepron for ppx  ID following

## 2022-01-04 NOTE — DIETITIAN INITIAL EVALUATION ADULT. - OTHER CALCULATIONS
Based on actual body weight as of 1/3 (100.9kg/222.4 pounds), adjusted for age and malnutrition Based on actual body weight as of 1/3 (100.9kg/222.4 pounds), adjusted for age and malnutrition. Fluid needs deferred to team.

## 2022-01-04 NOTE — DIETITIAN INITIAL EVALUATION ADULT. - CHIEF COMPLAINT
35 yo M with PMH of asthma, redundant prepuce/phimosis and aplastic anemia (diagnosed at [facility] on April 2020) - remission on May 2020, however, platelets decreased on September 2021. Admitted due to b/l nosebleed and fever. Found to have relapsed aplastic anemia - currently on Cyclosporine and to resume rabbit ATG/Prednisone starting 1/3. Per infectious note, continue Cefepime for fever.

## 2022-01-04 NOTE — DIETITIAN INITIAL EVALUATION ADULT. - REASON
2.6 (1.2%) weight loss x2 weeks and ~50% of PO intake since admission 2.6 (1.2%) weight loss x2 weeks and ~50% of PO intake since admission. NFPE performed with verbal consent from patient with RD preceptor present

## 2022-01-04 NOTE — DIETITIAN INITIAL EVALUATION ADULT. - PERTINENT MEDS FT
Mepron   Zovirax   Maxipime   Diflucan   NaCl IV   Thymoglobulin   Deltasone   Pepcid   Reglan   Zofran   Maalox   Tums   Solu-Cortef   Solu-Medrol   Protonix

## 2022-01-04 NOTE — PROGRESS NOTE ADULT - SUBJECTIVE AND OBJECTIVE BOX
Diagnosis: Relapsed aplastic anemia    Protocol/Chemo Regimen:  Rabbit ATG/Cyclosporine/prednisone/Promacta     Day: D9 CSA / Rabbit ATG day 3  (D2 suspended due to reaction; resumed on 1/3)     Pt endorsed: No acute complaints     Review of Systems: denies nausea, vomiting, diarrhea, dizziness, chest pain , SOB    Pain scale: 0                                          Diet: regular     Allergies    No Known Allergies    Intolerances        ANTIMICROBIALS  acyclovir   Oral Tab/Cap 400 milliGRAM(s) Oral every 8 hours  atovaquone  Suspension 1500 milliGRAM(s) Oral daily  cefepime   IVPB 2000 milliGRAM(s) IV Intermittent every 8 hours  fluconAZOLE   Tablet 200 milliGRAM(s) Oral daily      HEME/ONC MEDICATIONS  eltrombopag 150 milliGRAM(s) Oral daily      STANDING MEDICATIONS  acetaminophen     Tablet .. 650 milliGRAM(s) Oral daily  antithymocyte globulin rabbit IVPB 350 milliGRAM(s) IV Intermittent daily  Biotene Dry Mouth Oral Rinse 15 milliLiter(s) Swish and Spit four times a day  chlorhexidine 2% Cloths 1 Application(s) Topical <User Schedule>  cycloSPORINE  (SandIMMUNE) 325 milliGRAM(s) Oral every 12 hours  diphenhydrAMINE Injectable 50 milliGRAM(s) IV Push daily  famotidine Injectable 20 milliGRAM(s) IV Push every 12 hours  FIRST- Mouthwash  BLM 10 milliLiter(s) Swish and Spit four times a day  lidocaine 2% Viscous 2 milliLiter(s) Oral four times a day  methylPREDNISolone sodium succinate IVPB 500 milliGRAM(s) IV Intermittent once  pantoprazole    Tablet 40 milliGRAM(s) Oral before breakfast  predniSONE   Tablet 100 milliGRAM(s) Oral daily  sodium chloride 0.65% Nasal 1 Spray(s) Both Nostrils four times a day  sodium chloride 0.9%. 1000 milliLiter(s) IV Continuous <Continuous>      PRN MEDICATIONS  acetaminophen     Tablet .. 650 milliGRAM(s) Oral every 6 hours PRN  acetaminophen   IVPB .. 1000 milliGRAM(s) IV Intermittent once PRN  aluminum hydroxide/magnesium hydroxide/simethicone Suspension 30 milliLiter(s) Oral every 4 hours PRN  calcium carbonate    500 mG (Tums) Chewable 1 Tablet(s) Chew every 4 hours PRN  diphenhydrAMINE 25 milliGRAM(s) Oral every 12 hours PRN  diphenhydrAMINE Injectable 25 milliGRAM(s) IV Push once PRN  EPINEPHrine     1 mG/mL Injectable 0.3 milliGRAM(s) IntraMuscular once PRN  hydrocortisone sodium succinate Injectable 50 milliGRAM(s) IV Push every 12 hours PRN  metoclopramide Injectable 10 milliGRAM(s) IV Push every 6 hours PRN  ondansetron Injectable 8 milliGRAM(s) IV Push every 8 hours PRN        Vital Signs Last 24 Hrs  T(C): 36.8 (04 Jan 2022 05:00), Max: 37 (03 Jan 2022 21:00)  T(F): 98.2 (04 Jan 2022 05:00), Max: 98.6 (03 Jan 2022 21:00)  HR: 71 (04 Jan 2022 07:00) (70 - 95)  BP: 120/72 (04 Jan 2022 07:00) (115/66 - 146/86)  BP(mean): --  RR: 16 (04 Jan 2022 07:00) (16 - 18)  SpO2: 97% (04 Jan 2022 07:00) (94% - 98%)    PHYSICAL EXAM  General: adult in NAD  HEENT: clear oropharynx  CV: normal S1/S2 RRR  Lungs: clear to auscultation, no wheezes, no rales  Abdomen: soft non-tender non-distended  Ext: no edema  Skin: no rash  Neuro: alert and oriented X 4  Central Line: RUE PICC  c/d/i    LABS: Diagnosis: Relapsed aplastic anemia    Protocol/Chemo Regimen:  Rabbit ATG/Cyclosporine/prednisone/Promacta     Day: D9 CSA / Rabbit ATG day 3  (D2 suspended due to reaction; resumed on 1/3)     Pt endorsed: No acute complaints     Review of Systems: denies nausea, vomiting, diarrhea, dizziness, chest pain , SOB    Pain scale: 0                                          Diet: regular     Allergies    No Known Allergies    Intolerances        ANTIMICROBIALS  acyclovir   Oral Tab/Cap 400 milliGRAM(s) Oral every 8 hours  atovaquone  Suspension 1500 milliGRAM(s) Oral daily  cefepime   IVPB 2000 milliGRAM(s) IV Intermittent every 8 hours  fluconAZOLE   Tablet 200 milliGRAM(s) Oral daily      HEME/ONC MEDICATIONS  eltrombopag 150 milliGRAM(s) Oral daily      STANDING MEDICATIONS  acetaminophen     Tablet .. 650 milliGRAM(s) Oral daily  antithymocyte globulin rabbit IVPB 350 milliGRAM(s) IV Intermittent daily  Biotene Dry Mouth Oral Rinse 15 milliLiter(s) Swish and Spit four times a day  chlorhexidine 2% Cloths 1 Application(s) Topical <User Schedule>  cycloSPORINE  (SandIMMUNE) 325 milliGRAM(s) Oral every 12 hours  diphenhydrAMINE Injectable 50 milliGRAM(s) IV Push daily  famotidine Injectable 20 milliGRAM(s) IV Push every 12 hours  FIRST- Mouthwash  BLM 10 milliLiter(s) Swish and Spit four times a day  lidocaine 2% Viscous 2 milliLiter(s) Oral four times a day  methylPREDNISolone sodium succinate IVPB 500 milliGRAM(s) IV Intermittent once  pantoprazole    Tablet 40 milliGRAM(s) Oral before breakfast  predniSONE   Tablet 100 milliGRAM(s) Oral daily  sodium chloride 0.65% Nasal 1 Spray(s) Both Nostrils four times a day  sodium chloride 0.9%. 1000 milliLiter(s) IV Continuous <Continuous>      PRN MEDICATIONS  acetaminophen     Tablet .. 650 milliGRAM(s) Oral every 6 hours PRN  acetaminophen   IVPB .. 1000 milliGRAM(s) IV Intermittent once PRN  aluminum hydroxide/magnesium hydroxide/simethicone Suspension 30 milliLiter(s) Oral every 4 hours PRN  calcium carbonate    500 mG (Tums) Chewable 1 Tablet(s) Chew every 4 hours PRN  diphenhydrAMINE 25 milliGRAM(s) Oral every 12 hours PRN  diphenhydrAMINE Injectable 25 milliGRAM(s) IV Push once PRN  EPINEPHrine     1 mG/mL Injectable 0.3 milliGRAM(s) IntraMuscular once PRN  hydrocortisone sodium succinate Injectable 50 milliGRAM(s) IV Push every 12 hours PRN  metoclopramide Injectable 10 milliGRAM(s) IV Push every 6 hours PRN  ondansetron Injectable 8 milliGRAM(s) IV Push every 8 hours PRN        Vital Signs Last 24 Hrs  T(C): 36.8 (04 Jan 2022 05:00), Max: 37 (03 Jan 2022 21:00)  T(F): 98.2 (04 Jan 2022 05:00), Max: 98.6 (03 Jan 2022 21:00)  HR: 71 (04 Jan 2022 07:00) (70 - 95)  BP: 120/72 (04 Jan 2022 07:00) (115/66 - 146/86)  BP(mean): --  RR: 16 (04 Jan 2022 07:00) (16 - 18)  SpO2: 97% (04 Jan 2022 07:00) (94% - 98%)    PHYSICAL EXAM  General: adult in NAD  HEENT: clear oropharynx  CV: normal S1/S2 RRR  Lungs: clear to auscultation, no wheezes, no rales  Abdomen: soft non-tender non-distended  Ext: no edema  Skin: no rash  Neuro: alert and oriented X 4  Central Line: RUE PICC  c/d/i    LABS:                  7.8    0.15  )-----------( 7        ( 04 Jan 2022 06:50 )             21.6         Mean Cell Volume : 83.7 fl  Mean Cell Hemoglobin : 30.2 pg  Mean Cell Hemoglobin Concentration : 36.1 gm/dL  Auto Neutrophil # : x  Auto Lymphocyte # : x  Auto Monocyte # : x  Auto Eosinophil # : x  Auto Basophil # : x  Auto Neutrophil % : x  Auto Lymphocyte % : x  Auto Monocyte % : x  Auto Eosinophil % : x  Auto Basophil % : x      01-04    138  |  105  |  23  ----------------------------<  137<H>  4.6   |  17<L>  |  0.78    Ca    9.1      04 Jan 2022 06:50  Phos  3.5     01-04  Mg     1.7     01-04    TPro  7.4  /  Alb  4.2  /  TBili  1.2  /  DBili  x   /  AST  53<H>  /  ALT  92<H>  /  AlkPhos  70  01-04    PT/INR - ( 04 Jan 2022 06:52 )   PT: 14.0 sec;   INR: 1.18 ratio         PTT - ( 04 Jan 2022 06:52 )  PTT:29.9 sec      Uric Acid 2.9

## 2022-01-04 NOTE — DIETITIAN INITIAL EVALUATION ADULT. - RD TO REMAIN AVAILABLE
Mabel Brown RD (Pager #[phone]). Jenn Garcia, Dietetic Intern/yes Mabel Brown RD (Pager #[phone]). Jenn Garcia, Dietetic Intern (Pager #[phone])/yes

## 2022-01-04 NOTE — DIETITIAN INITIAL EVALUATION ADULT. - CURRENT DIET ORDER MEETS ESTIMATED NUTRIENT REQUIREMENTS
This is a 33 yr old male with hx of schizophrenia, GERD presents from [facility] for increasing stressors.  Patient confirms that he stated to triage RN " I want humans to be extinct." Patient is vague and elusive with questions. Asking for admission to [facility]. Denies SI/HI Denies AH/VH Denies ETOH/Illicit drugs  Patient reports he dies not like his residence and is looking for a break from there. Reports he is not getting along with other members of his facility. patient has been seen here before with similar complaints and discharged.
Statement Selected

## 2022-01-04 NOTE — PROGRESS NOTE ADULT - ASSESSMENT
36M with Aplastic Anemia diagnosed April 2020.  Underwent treatment with equine ATG, Cyclosporine, and Promacta.  Went into remission (BM bx 5/2021 with normal morphology and cellularity).  Promacta stopped May 2021.  Cyclosporin stopped August.  By September, platelets started to drop.  Now with pancytopenia and neutropenic fever.  s/p ATG    Neutropenic fever  - fever resolved  - remains neutropenic  - can continue cefepime  - monitor for further fevers  - if fever recurs, would change diflucan to voriconazole    Increased LFT  - if worsens, check RUQ sono

## 2022-01-04 NOTE — PROGRESS NOTE ADULT - ATTENDING COMMENTS
36M with Aplastic Anemia diagnosed April 2020.  Underwent treatment with equine ATG, Cyclosporine, and Promacta.  Went into remission (BM bx 5/2021 with normal morphology and cellularity).  Promacta stopped May 2021.  Cyclosporin stopped August 2021.  By September, platelets started to drop.  Now with pancytopenia and neutropenic fever.  No obvious focus of infection, however, c/o sorethroat.  On cyclosporine 300 mg BID (started on 12/26/21), first level on 12/29/21 --197. increased dose to 325mg bid, repeat level 230.  Today is day 8    Resume r-ATG.  Continue treatment for AA.    JACKELINE CHEW MD 36M with Aplastic Anemia diagnosed April 2020.  Underwent treatment with equine ATG, Cyclosporine, and Promacta.  Went into remission (BM bx 5/2021 with normal morphology and cellularity).  Promacta stopped May 2021.  Cyclosporin stopped August 2021.  By September, platelets started to drop.  Now with pancytopenia and neutropenic fever.  No obvious focus of infection. On cefepime, diflucan, acyclovir and mepron.  On cyclosporine 300 mg BID (started on 12/26/21), first level on 12/29/21 --197. increased dose to 325mg bid, repeat level 230. Receiving rATG day 3 and continues with prednisone and Promacta.

## 2022-01-04 NOTE — DIETITIAN INITIAL EVALUATION ADULT. - ORAL INTAKE PTA/DIET HISTORY
Reports good appetite and intake. Consumes x3 meals and x2 snacks/day of generally well-balanced meals including all food groups. Mostly cooks meals at home with girlfriend. Follows no therapeutic restrictions, NFKA. Denies use of micronutrient supplements. Drinks Ensure Enlive x1/day.

## 2022-01-05 LAB
ALBUMIN SERPL ELPH-MCNC: 4.2 G/DL — SIGNIFICANT CHANGE UP (ref 3.3–5)
ALP SERPL-CCNC: 62 U/L — SIGNIFICANT CHANGE UP (ref 40–120)
ALT FLD-CCNC: 83 U/L — HIGH (ref 10–45)
ANION GAP SERPL CALC-SCNC: 14 MMOL/L — SIGNIFICANT CHANGE UP (ref 5–17)
APTT BLD: 27.3 SEC — LOW (ref 27.5–35.5)
AST SERPL-CCNC: 19 U/L — SIGNIFICANT CHANGE UP (ref 10–40)
BILIRUB SERPL-MCNC: 1 MG/DL — SIGNIFICANT CHANGE UP (ref 0.2–1.2)
BUN SERPL-MCNC: 22 MG/DL — SIGNIFICANT CHANGE UP (ref 7–23)
CALCIUM SERPL-MCNC: 9 MG/DL — SIGNIFICANT CHANGE UP (ref 8.4–10.5)
CHLORIDE SERPL-SCNC: 104 MMOL/L — SIGNIFICANT CHANGE UP (ref 96–108)
CO2 SERPL-SCNC: 19 MMOL/L — LOW (ref 22–31)
CREAT SERPL-MCNC: 0.76 MG/DL — SIGNIFICANT CHANGE UP (ref 0.5–1.3)
FIBRINOGEN PPP-MCNC: 641 MG/DL — HIGH (ref 290–520)
GLUCOSE SERPL-MCNC: 146 MG/DL — HIGH (ref 70–99)
HCT VFR BLD CALC: 19.1 % — CRITICAL LOW (ref 39–50)
HGB BLD-MCNC: 7 G/DL — CRITICAL LOW (ref 13–17)
INR BLD: 1.15 RATIO — SIGNIFICANT CHANGE UP (ref 0.88–1.16)
LDH SERPL L TO P-CCNC: 181 U/L — SIGNIFICANT CHANGE UP (ref 50–242)
MAGNESIUM SERPL-MCNC: 1.8 MG/DL — SIGNIFICANT CHANGE UP (ref 1.6–2.6)
MCHC RBC-ENTMCNC: 30.6 PG — SIGNIFICANT CHANGE UP (ref 27–34)
MCHC RBC-ENTMCNC: 36.6 GM/DL — HIGH (ref 32–36)
MCV RBC AUTO: 83.4 FL — SIGNIFICANT CHANGE UP (ref 80–100)
NRBC # BLD: 0 /100 WBCS — SIGNIFICANT CHANGE UP (ref 0–0)
PHOSPHATE SERPL-MCNC: 3.6 MG/DL — SIGNIFICANT CHANGE UP (ref 2.5–4.5)
PLATELET # BLD AUTO: 19 K/UL — CRITICAL LOW (ref 150–400)
POTASSIUM SERPL-MCNC: 4.4 MMOL/L — SIGNIFICANT CHANGE UP (ref 3.5–5.3)
POTASSIUM SERPL-SCNC: 4.4 MMOL/L — SIGNIFICANT CHANGE UP (ref 3.5–5.3)
PROT SERPL-MCNC: 7 G/DL — SIGNIFICANT CHANGE UP (ref 6–8.3)
PROTHROM AB SERPL-ACNC: 13.7 SEC — HIGH (ref 10.6–13.6)
RBC # BLD: 2.29 M/UL — LOW (ref 4.2–5.8)
RBC # FLD: 12.3 % — SIGNIFICANT CHANGE UP (ref 10.3–14.5)
SODIUM SERPL-SCNC: 137 MMOL/L — SIGNIFICANT CHANGE UP (ref 135–145)
URATE SERPL-MCNC: 2.7 MG/DL — LOW (ref 3.4–8.8)
WBC # BLD: 0.15 K/UL — CRITICAL LOW (ref 3.8–10.5)
WBC # FLD AUTO: 0.15 K/UL — CRITICAL LOW (ref 3.8–10.5)

## 2022-01-05 PROCEDURE — 99232 SBSQ HOSP IP/OBS MODERATE 35: CPT

## 2022-01-05 PROCEDURE — 99233 SBSQ HOSP IP/OBS HIGH 50: CPT | Mod: GC

## 2022-01-05 RX ORDER — DIPHENHYDRAMINE HCL 50 MG
50 CAPSULE ORAL DAILY
Refills: 0 | Status: COMPLETED | OUTPATIENT
Start: 2022-01-05 | End: 2022-01-06

## 2022-01-05 RX ADMIN — CEFEPIME 100 MILLIGRAM(S): 1 INJECTION, POWDER, FOR SOLUTION INTRAMUSCULAR; INTRAVENOUS at 06:49

## 2022-01-05 RX ADMIN — ELTROMBOPAG OLAMINE 150 MILLIGRAM(S): 50 TABLET, FILM COATED ORAL at 11:25

## 2022-01-05 RX ADMIN — Medication 400 MILLIGRAM(S): at 14:58

## 2022-01-05 RX ADMIN — CHLORHEXIDINE GLUCONATE 1 APPLICATION(S): 213 SOLUTION TOPICAL at 08:55

## 2022-01-05 RX ADMIN — Medication 15 MILLILITER(S): at 11:24

## 2022-01-05 RX ADMIN — Medication 15 MILLILITER(S): at 00:45

## 2022-01-05 RX ADMIN — Medication 650 MILLIGRAM(S): at 14:58

## 2022-01-05 RX ADMIN — SODIUM CHLORIDE 50 MILLILITER(S): 9 INJECTION INTRAMUSCULAR; INTRAVENOUS; SUBCUTANEOUS at 06:50

## 2022-01-05 RX ADMIN — Medication 15 MILLILITER(S): at 06:50

## 2022-01-05 RX ADMIN — CYCLOSPORINE 325 MILLIGRAM(S): 100 CAPSULE ORAL at 06:50

## 2022-01-05 RX ADMIN — Medication 15 MILLILITER(S): at 18:03

## 2022-01-05 RX ADMIN — FAMOTIDINE 20 MILLIGRAM(S): 10 INJECTION INTRAVENOUS at 11:25

## 2022-01-05 RX ADMIN — ATOVAQUONE 1500 MILLIGRAM(S): 750 SUSPENSION ORAL at 11:24

## 2022-01-05 RX ADMIN — CEFEPIME 100 MILLIGRAM(S): 1 INJECTION, POWDER, FOR SOLUTION INTRAMUSCULAR; INTRAVENOUS at 14:57

## 2022-01-05 RX ADMIN — CYCLOSPORINE 325 MILLIGRAM(S): 100 CAPSULE ORAL at 18:03

## 2022-01-05 RX ADMIN — Medication 50 MILLIGRAM(S): at 16:57

## 2022-01-05 RX ADMIN — CEFEPIME 100 MILLIGRAM(S): 1 INJECTION, POWDER, FOR SOLUTION INTRAMUSCULAR; INTRAVENOUS at 22:00

## 2022-01-05 RX ADMIN — FLUCONAZOLE 200 MILLIGRAM(S): 150 TABLET ORAL at 11:25

## 2022-01-05 RX ADMIN — Medication 400 MILLIGRAM(S): at 22:00

## 2022-01-05 RX ADMIN — Medication 100 MILLIGRAM(S): at 14:58

## 2022-01-05 RX ADMIN — SODIUM CHLORIDE 50 MILLILITER(S): 9 INJECTION INTRAMUSCULAR; INTRAVENOUS; SUBCUTANEOUS at 08:55

## 2022-01-05 RX ADMIN — Medication 15 MILLILITER(S): at 23:35

## 2022-01-05 RX ADMIN — LIDOCAINE 2 MILLILITER(S): 4 CREAM TOPICAL at 11:24

## 2022-01-05 RX ADMIN — Medication 400 MILLIGRAM(S): at 06:49

## 2022-01-05 RX ADMIN — PANTOPRAZOLE SODIUM 40 MILLIGRAM(S): 20 TABLET, DELAYED RELEASE ORAL at 06:49

## 2022-01-05 RX ADMIN — Medication 650 MILLIGRAM(S): at 15:09

## 2022-01-05 NOTE — PROGRESS NOTE ADULT - PROBLEM SELECTOR PLAN 2
Neutropenic, afebrile   continue cefepime , diflucan, Acyclovir for now  throat culture (-). MRSA (-)  CT CAP  to look for source of infection- No CT evidence for source of infection in the chest, abdomen or pelvis.  12/24- Staph aureus PCR detected, will start Vanco 1 gm Q 12 hrs stopped 12/26. CT maxillo facial did not reveal any abscess.   1/3 Started Mepron for ppx  ID following Neutropenic, afebrile   continue cefepime , diflucan, Acyclovir for now  throat culture (-). MRSA (-)  CT CAP  to look for source of infection- No CT evidence for source of infection in the chest, abdomen or pelvis.  12/24- Staph aureus PCR detected, will start Vanco 1 gm Q 12 hrs stopped 12/26. CT maxillo facial did not reveal any abscess.   1/3 Started Mepron for ppx  1/5 if fever recurs will discontinue Diflucan and start voriconazole as per ID  ID following

## 2022-01-05 NOTE — PROGRESS NOTE ADULT - ATTENDING COMMENTS
36M with Aplastic Anemia diagnosed April 2020.  Underwent treatment with equine ATG, Cyclosporine, and Promacta.  Went into remission (BM bx 5/2021 with normal morphology and cellularity).  Promacta stopped May 2021.  Cyclosporin stopped August 2021.  By September, platelets started to drop.  Now with pancytopenia and neutropenic fever.  No obvious focus of infection. On cefepime, diflucan, acyclovir and mepron.  On cyclosporine 300 mg BID (started on 12/26/21), first level on 12/29/21 --197. increased dose to 325mg bid, repeat level 230. Receiving rATG day 3 and continues with prednisone and Promacta. 36M with Aplastic Anemia diagnosed April 2020.  Underwent treatment with equine ATG, Cyclosporine, and Promacta.  Went into remission (BM bx 5/2021 with normal morphology and cellularity).  Promacta stopped May 2021.  Cyclosporin stopped August 2021.  By September, platelets started to drop.  Now with pancytopenia and neutropenic fever.  No obvious focus of infection. On cefepime, diflucan, acyclovir and mepron.  On cyclosporine 300 mg BID (started on 12/26/21), first level on 12/29/21 --197. increased dose to 325mg bid, repeat level 230. Receiving rATG day 3 and continues with prednisone and Promacta.      JACKELINE CHEW MD

## 2022-01-05 NOTE — PROGRESS NOTE ADULT - ASSESSMENT
36M with Aplastic Anemia diagnosed April 2020.  Underwent treatment with equine ATG, Cyclosporine, and Promacta.  Went into remission (BM bx 5/2021 with normal morphology and cellularity).  Promacta stopped May 2021.  Cyclosporin stopped August.  By September, platelets started to drop.  Now with pancytopenia and neutropenic fever.  s/p ATG    Neutropenic fever  - fever resolved  - remains neutropenic  - can continue cefepime  - monitor for further fevers  - if fever recurs, would change diflucan to voriconazole    Increased LFT  - better    Please call Infectious Diseases if there is a change in status.  Thank you.  [phone].

## 2022-01-05 NOTE — PROGRESS NOTE ADULT - PROBLEM SELECTOR PLAN 1
Relapsed Aplastic Anemia   s/p hATG, CSA, and promacta with complete remission in 2020, BM bx on 11/9/20 revealed hypocellular marrow.  BMbx 12/2021 showing erythroid predominance, myeloid and erythroid maturation, and markedly decreased megakaryocytes c/w relapse  12/23-   transferred  to [facility] for rATG treatment  Monitor CBC with diff/lytes, transfuse and or replete PRN, Monitor weight, I and O, mouth care  12/26- Started Cyclosporine 300 mg PO Q 12 hrs , 12/27 test dose - no reaction noted  12/28 PICC placed  12/29 Started  Rabbit ATG/prednisone-Reaction to rATG- HELD on 12/30 due to reaction ; Continue promacta  1/3 Resumed  rabbit ATG/prednisone  Thrombocytopenia- Replace plt Relapsed Aplastic Anemia   s/p hATG, CSA, and promacta with complete remission in 2020, BM bx on 11/9/20 revealed hypocellular marrow.  BMbx 12/2021 showing erythroid predominance, myeloid and erythroid maturation, and markedly decreased megakaryocytes c/w relapse  12/23-   transferred  to [facility] for rATG treatment  Monitor CBC with diff/lytes, transfuse and or replete PRN, Monitor weight, I and O, mouth care  12/26- Started Cyclosporine 300 mg PO Q 12 hrs , 12/27 test dose - no reaction noted  12/28 PICC placed  12/29 Started  Rabbit ATG/prednisone-Reaction to rATG- HELD on 12/30 due to reaction ; Continue promacta  1/3 Resumed  rabbit ATG/prednisone Relapsed Aplastic Anemia   s/p hATG, CSA, and promacta with complete remission in 2020, BM bx on 11/9/20 revealed hypocellular marrow.  BMbx 12/2021 showing erythroid predominance, myeloid and erythroid maturation, and markedly decreased megakaryocytes c/w relapse  12/23-   transferred  to [facility] for rATG treatment  Monitor CBC with diff/lytes, transfuse and or replete PRN, Monitor weight, I and O, mouth care  12/26- Started Cyclosporine 300 mg PO Q 12 hrs , 12/27 test dose - no reaction noted  12/28 PICC placed  12/29 Started  Rabbit ATG/prednisone-Reaction to rATG- HELD on 12/30 due to reaction ; Continue promacta  1/3 Resumed  rabbit ATG/prednisone  1/5 discharge planning

## 2022-01-05 NOTE — PROGRESS NOTE ADULT - SUBJECTIVE AND OBJECTIVE BOX
Diagnosis: Relapsed aplastic anemia    Protocol/Chemo Regimen:  Rabbit ATG/Cyclosporine/prednisone/Promacta     Day: D10 CSA / Rabbit ATG day 4  (D2 suspended due to reaction; resumed on 1/3)     Pt endorsed: No acute complaints     Review of Systems: denies nausea, vomiting, diarrhea, dizziness, chest pain , SOB    Pain scale: 0                                          Diet: regular     Allergies    No Known Allergies    Intolerances        ANTIMICROBIALS  acyclovir   Oral Tab/Cap 400 milliGRAM(s) Oral every 8 hours  atovaquone  Suspension 1500 milliGRAM(s) Oral daily  cefepime   IVPB 2000 milliGRAM(s) IV Intermittent every 8 hours  fluconAZOLE   Tablet 200 milliGRAM(s) Oral daily      HEME/ONC MEDICATIONS  eltrombopag 150 milliGRAM(s) Oral daily      STANDING MEDICATIONS  acetaminophen     Tablet .. 650 milliGRAM(s) Oral daily  antithymocyte globulin rabbit IVPB 350 milliGRAM(s) IV Intermittent daily  Biotene Dry Mouth Oral Rinse 15 milliLiter(s) Swish and Spit four times a day  chlorhexidine 2% Cloths 1 Application(s) Topical <User Schedule>  cycloSPORINE  (SandIMMUNE) 325 milliGRAM(s) Oral every 12 hours  diphenhydrAMINE Injectable 50 milliGRAM(s) IV Push daily  famotidine Injectable 20 milliGRAM(s) IV Push every 12 hours  FIRST- Mouthwash  BLM 10 milliLiter(s) Swish and Spit four times a day  lidocaine 2% Viscous 2 milliLiter(s) Oral four times a day  methylPREDNISolone sodium succinate IVPB 500 milliGRAM(s) IV Intermittent once  pantoprazole    Tablet 40 milliGRAM(s) Oral before breakfast  predniSONE   Tablet 100 milliGRAM(s) Oral daily  sodium chloride 0.65% Nasal 1 Spray(s) Both Nostrils four times a day  sodium chloride 0.9%. 1000 milliLiter(s) IV Continuous <Continuous>      PRN MEDICATIONS  acetaminophen     Tablet .. 650 milliGRAM(s) Oral every 6 hours PRN  acetaminophen   IVPB .. 1000 milliGRAM(s) IV Intermittent once PRN  aluminum hydroxide/magnesium hydroxide/simethicone Suspension 30 milliLiter(s) Oral every 4 hours PRN  calcium carbonate    500 mG (Tums) Chewable 1 Tablet(s) Chew every 4 hours PRN  diphenhydrAMINE 25 milliGRAM(s) Oral every 12 hours PRN  diphenhydrAMINE Injectable 25 milliGRAM(s) IV Push once PRN  EPINEPHrine     1 mG/mL Injectable 0.3 milliGRAM(s) IntraMuscular once PRN  hydrocortisone sodium succinate Injectable 50 milliGRAM(s) IV Push every 12 hours PRN  metoclopramide Injectable 10 milliGRAM(s) IV Push every 6 hours PRN  ondansetron Injectable 8 milliGRAM(s) IV Push every 8 hours PRN        Vital Signs Last 24 Hrs  T(C): 36.8 (04 Jan 2022 05:00), Max: 37 (03 Jan 2022 21:00)  T(F): 98.2 (04 Jan 2022 05:00), Max: 98.6 (03 Jan 2022 21:00)  HR: 71 (04 Jan 2022 07:00) (70 - 95)  BP: 120/72 (04 Jan 2022 07:00) (115/66 - 146/86)  BP(mean): --  RR: 16 (04 Jan 2022 07:00) (16 - 18)  SpO2: 97% (04 Jan 2022 07:00) (94% - 98%)    PHYSICAL EXAM  General: adult in NAD  HEENT: clear oropharynx  CV: normal S1/S2 RRR  Lungs: clear to auscultation, no wheezes, no rales  Abdomen: soft non-tender non-distended  Ext: no edema  Skin: no rash  Neuro: alert and oriented X 4  Central Line: RUE PICC  c/d/i    LABS:                  7.8    0.15  )-----------( 7        ( 04 Jan 2022 06:50 )             21.6         Mean Cell Volume : 83.7 fl  Mean Cell Hemoglobin : 30.2 pg  Mean Cell Hemoglobin Concentration : 36.1 gm/dL  Auto Neutrophil # : x  Auto Lymphocyte # : x  Auto Monocyte # : x  Auto Eosinophil # : x  Auto Basophil # : x  Auto Neutrophil % : x  Auto Lymphocyte % : x  Auto Monocyte % : x  Auto Eosinophil % : x  Auto Basophil % : x      01-04    138  |  105  |  23  ----------------------------<  137<H>  4.6   |  17<L>  |  0.78    Ca    9.1      04 Jan 2022 06:50  Phos  3.5     01-04  Mg     1.7     01-04    TPro  7.4  /  Alb  4.2  /  TBili  1.2  /  DBili  x   /  AST  53<H>  /  ALT  92<H>  /  AlkPhos  70  01-04    PT/INR - ( 04 Jan 2022 06:52 )   PT: 14.0 sec;   INR: 1.18 ratio         PTT - ( 04 Jan 2022 06:52 )  PTT:29.9 sec      Uric Acid 2.9               Diagnosis: Relapsed aplastic anemia    Protocol/Chemo Regimen:  Rabbit ATG/Cyclosporine/prednisone/Promacta     Day: D10 CSA / Rabbit ATG day 4  (D2 suspended due to reaction; resumed on 1/3)     Pt endorsed: No acute complaints     Review of Systems: denies nausea, vomiting, diarrhea, dizziness, chest pain , SOB    Pain scale: 0                                          Diet: regular     Allergies    No Known Allergies    Intolerances    ANTIMICROBIALS  acyclovir   Oral Tab/Cap 400 milliGRAM(s) Oral every 8 hours  atovaquone  Suspension 1500 milliGRAM(s) Oral daily  cefepime   IVPB 2000 milliGRAM(s) IV Intermittent every 8 hours  fluconAZOLE   Tablet 200 milliGRAM(s) Oral daily      HEME/ONC MEDICATIONS  eltrombopag 150 milliGRAM(s) Oral daily      STANDING MEDICATIONS  acetaminophen     Tablet .. 650 milliGRAM(s) Oral daily  antithymocyte globulin rabbit IVPB 350 milliGRAM(s) IV Intermittent daily  Biotene Dry Mouth Oral Rinse 15 milliLiter(s) Swish and Spit four times a day  chlorhexidine 2% Cloths 1 Application(s) Topical <User Schedule>  cycloSPORINE  (SandIMMUNE) 325 milliGRAM(s) Oral every 12 hours  diphenhydrAMINE Injectable 50 milliGRAM(s) IV Push daily  famotidine Injectable 20 milliGRAM(s) IV Push every 12 hours  FIRST- Mouthwash  BLM 10 milliLiter(s) Swish and Spit four times a day  lidocaine 2% Viscous 2 milliLiter(s) Oral four times a day  methylPREDNISolone sodium succinate IVPB 500 milliGRAM(s) IV Intermittent once  pantoprazole    Tablet 40 milliGRAM(s) Oral before breakfast  predniSONE   Tablet 100 milliGRAM(s) Oral daily  sodium chloride 0.65% Nasal 1 Spray(s) Both Nostrils four times a day  sodium chloride 0.9%. 1000 milliLiter(s) IV Continuous <Continuous>      PRN MEDICATIONS  acetaminophen     Tablet .. 650 milliGRAM(s) Oral every 6 hours PRN  acetaminophen   IVPB .. 1000 milliGRAM(s) IV Intermittent once PRN  aluminum hydroxide/magnesium hydroxide/simethicone Suspension 30 milliLiter(s) Oral every 4 hours PRN  calcium carbonate    500 mG (Tums) Chewable 1 Tablet(s) Chew every 4 hours PRN  diphenhydrAMINE 25 milliGRAM(s) Oral every 12 hours PRN  diphenhydrAMINE Injectable 25 milliGRAM(s) IV Push once PRN  EPINEPHrine     1 mG/mL Injectable 0.3 milliGRAM(s) IntraMuscular once PRN  hydrocortisone sodium succinate Injectable 50 milliGRAM(s) IV Push every 12 hours PRN  metoclopramide Injectable 10 milliGRAM(s) IV Push every 6 hours PRN  ondansetron Injectable 8 milliGRAM(s) IV Push every 8 hours PRN    Vital Signs Last 24 Hrs  T(C): 36 (05 Jan 2022 09:00), Max: 36.8 (04 Jan 2022 17:02)  T(F): 96.8 (05 Jan 2022 09:00), Max: 98.3 (04 Jan 2022 17:02)  HR: 86 (05 Jan 2022 09:00) (73 - 103)  BP: 133/85 (05 Jan 2022 09:00) (131/82 - 153/95)  BP(mean): --  RR: 18 (05 Jan 2022 09:00) (18 - 18)  SpO2: 99% (05 Jan 2022 09:00) (96% - 99%)    PHYSICAL EXAM  General: adult in NAD  HEENT: clear oropharynx  CV: normal S1/S2 RRR  Lungs: clear to auscultation, no wheezes, no rales  Abdomen: soft non-tender non-distended  Ext: no edema  Skin: no rash  Neuro: alert and oriented X 4  Central Line: RUE PICC  c/d/i    LABS:                            7.0    0.15  )-----------( 19       ( 05 Jan 2022 07:18 )             19.1     05 Jan 2022 07:16    137    |  104    |  22     ----------------------------<  146    4.4     |  19     |  0.76     Ca    9.0        05 Jan 2022 07:16  Phos  3.6       05 Jan 2022 07:16  Mg     1.8       05 Jan 2022 07:16    TPro  7.0    /  Alb  4.2    /  TBili  1.0    /  DBili  x      /  AST  19     /  ALT  83     /  AlkPhos  62     05 Jan 2022 07:16    PT/INR - ( 05 Jan 2022 07:16 )   PT: 13.7 sec;   INR: 1.15 ratio      PTT - ( 05 Jan 2022 07:16 )  PTT:27.3 sec    LIVER FUNCTIONS - ( 05 Jan 2022 07:16 )  Alb: 4.2 g/dL / Pro: 7.0 g/dL / ALK PHOS: 62 U/L / ALT: 83 U/L / AST: 19 U/L / GGT: x                 Uric Acid 2.7               Diagnosis: Relapsed aplastic anemia    Protocol/Chemo Regimen:  Rabbit ATG/Cyclosporine/prednisone/Promacta     Day: D10 CSA / Rabbit ATG day 4  (D2 suspended due to reaction; resumed on 1/3)     Pt endorsed: No acute complaints     Review of Systems: denies nausea, vomiting, diarrhea, dizziness, chest pain , SOB    Pain scale: 0                                          Diet: regular     Allergies    No Known Allergies    Intolerances    ANTIMICROBIALS  acyclovir   Oral Tab/Cap 400 milliGRAM(s) Oral every 8 hours  atovaquone  Suspension 1500 milliGRAM(s) Oral daily  cefepime   IVPB 2000 milliGRAM(s) IV Intermittent every 8 hours  fluconAZOLE   Tablet 200 milliGRAM(s) Oral daily      HEME/ONC MEDICATIONS  eltrombopag 150 milliGRAM(s) Oral daily      STANDING MEDICATIONS  acetaminophen     Tablet .. 650 milliGRAM(s) Oral daily  antithymocyte globulin rabbit IVPB 350 milliGRAM(s) IV Intermittent daily  Biotene Dry Mouth Oral Rinse 15 milliLiter(s) Swish and Spit four times a day  chlorhexidine 2% Cloths 1 Application(s) Topical <User Schedule>  cycloSPORINE  (SandIMMUNE) 325 milliGRAM(s) Oral every 12 hours  diphenhydrAMINE Injectable 50 milliGRAM(s) IV Push daily  famotidine Injectable 20 milliGRAM(s) IV Push every 12 hours  FIRST- Mouthwash  BLM 10 milliLiter(s) Swish and Spit four times a day  lidocaine 2% Viscous 2 milliLiter(s) Oral four times a day  methylPREDNISolone sodium succinate IVPB 500 milliGRAM(s) IV Intermittent once  pantoprazole    Tablet 40 milliGRAM(s) Oral before breakfast  predniSONE   Tablet 100 milliGRAM(s) Oral daily  sodium chloride 0.65% Nasal 1 Spray(s) Both Nostrils four times a day  sodium chloride 0.9%. 1000 milliLiter(s) IV Continuous <Continuous>      PRN MEDICATIONS  acetaminophen     Tablet .. 650 milliGRAM(s) Oral every 6 hours PRN  acetaminophen   IVPB .. 1000 milliGRAM(s) IV Intermittent once PRN  aluminum hydroxide/magnesium hydroxide/simethicone Suspension 30 milliLiter(s) Oral every 4 hours PRN  calcium carbonate    500 mG (Tums) Chewable 1 Tablet(s) Chew every 4 hours PRN  diphenhydrAMINE 25 milliGRAM(s) Oral every 12 hours PRN  diphenhydrAMINE Injectable 25 milliGRAM(s) IV Push once PRN  EPINEPHrine     1 mG/mL Injectable 0.3 milliGRAM(s) IntraMuscular once PRN  hydrocortisone sodium succinate Injectable 50 milliGRAM(s) IV Push every 12 hours PRN  metoclopramide Injectable 10 milliGRAM(s) IV Push every 6 hours PRN  ondansetron Injectable 8 milliGRAM(s) IV Push every 8 hours PRN    Vital Signs Last 24 Hrs  T(C): 36.8 (05 Jan 2022 13:45), Max: 36.8 (04 Jan 2022 17:02)  T(F): 98.2 (05 Jan 2022 13:45), Max: 98.3 (04 Jan 2022 17:02)  HR: 84 (05 Jan 2022 13:45) (73 - 103)  BP: 150/93 (05 Jan 2022 13:45) (131/82 - 153/95)  BP(mean): --  RR: 18 (05 Jan 2022 13:45) (18 - 18)  SpO2: 97% (05 Jan 2022 13:45) (96% - 99%)    PHYSICAL EXAM  General: adult in NAD  HEENT: clear oropharynx  CV: normal S1/S2 RRR  Lungs: clear to auscultation, no wheezes, no rales  Abdomen: soft non-tender non-distended  Ext: no edema  Skin: no rash  Neuro: alert and oriented X 4  Central Line: RUE PICC  c/d/i    LABS:                            7.0    0.15  )-----------( 19       ( 05 Jan 2022 07:18 )             19.1     05 Jan 2022 07:16    137    |  104    |  22     ----------------------------<  146    4.4     |  19     |  0.76     Ca    9.0        05 Jan 2022 07:16  Phos  3.6       05 Jan 2022 07:16  Mg     1.8       05 Jan 2022 07:16    TPro  7.0    /  Alb  4.2    /  TBili  1.0    /  DBili  x      /  AST  19     /  ALT  83     /  AlkPhos  62     05 Jan 2022 07:16    PT/INR - ( 05 Jan 2022 07:16 )   PT: 13.7 sec;   INR: 1.15 ratio      PTT - ( 05 Jan 2022 07:16 )  PTT:27.3 sec    LIVER FUNCTIONS - ( 05 Jan 2022 07:16 )  Alb: 4.2 g/dL / Pro: 7.0 g/dL / ALK PHOS: 62 U/L / ALT: 83 U/L / AST: 19 U/L / GGT: x                 Uric Acid 2.7

## 2022-01-05 NOTE — PROGRESS NOTE ADULT - SUBJECTIVE AND OBJECTIVE BOX
Patient is a 36y old  Male who presents with a chief complaint of nosebleed and fever (22 Dec 2021 14:22)    f/u neutropenic fever    Interval History/ROS:  icterus better.  feels generally better.  no SOB/cough, no dysuria.  Remainder of ROS otherwise negative.    PAST MEDICAL & SURGICAL HISTORY:  Aplastic Anemia  Asthma, stable  Redundant prepuce and phimosis  H/O circumcision    Allergies  No Known Allergies    ANTIMICROBIALS:  acyclovir   Oral Tab/Cap 400 every 8 hours  antithymocyte globulin rabbit IVPB 350 daily  atovaquone  Suspension 1500 daily  cefepime   IVPB 2000 every 8 hours  cycloSPORINE  (SandIMMUNE) 325 every 12 hours  fluconAZOLE   Tablet 200 daily    MEDICATIONS  (STANDING):  acetaminophen     Tablet .. 650 daily  antithymocyte globulin rabbit IVPB 350 daily  cycloSPORINE  (SandIMMUNE) 325 every 12 hours  diphenhydrAMINE Injectable 50 daily  eltrombopag 150 daily  famotidine    Tablet 20 daily  methylPREDNISolone sodium succinate IVPB 500 once  pantoprazole    Tablet 40 before breakfast  predniSONE   Tablet 100 daily    Vital Signs Last 24 Hrs  T(F): 96.8 (22 @ 09:00), Max: 98.3 (22 @ 17:02)  HR: 86 (22 @ 09:00)  BP: 133/85 (22 @ 09:00)  RR: 18 (22 @ 09:00)  SpO2: 99% (22 @ 09:00) (96% - 99%)    PHYSICAL EXAM:  Constitutional: non-toxic  HEAD/EYES: mild icterus  ENT:  supple  Cardiovascular:   normal S1, S2  Respiratory:  clear BS bilaterally  GI:  soft, non-tender, normal bowel sounds  :  no richter  Musculoskeletal:  no synovitis,  Neurologic: awake and alert, normal strength, no focal findings  Psychiatric:  awake, alert, appropriate mood                                    7.0    0.15  )-----------( 19       ( 2022 07:18 )             19.1     137  |  104  |  22  ----------------------------<  146  4.4   |  19  |  0.76  Ca    9.0      2022 07:16Phos  3.6     -Mg     1.8       TPro  7.0  /  Alb  4.2  /  TBili  1.0  /  DBili  x   /  AST  19  /  ALT  83  /  AlkPhos  62      Urinalysis Basic - ( 21 Dec 2021 20:53 )  Color: Yellow / Appearance: Clear / S.034 / pH: x  Gluc: x / Ketone: Negative  / Bili: Negative / Urobili: Negative   Blood: x / Protein: 30 mg/dL / Nitrite: Negative   Leuk Esterase: Negative / RBC: 2 /hpf / WBC 1 /HPF   Sq Epi: x / Non Sq Epi: 0 /hpf / Bacteria: Negative    MICROBIOLOGY:  Culture - Urine (collected 21 @ 00:57)  Source: Clean Catch Clean Catch (Midstream)  Final Report (21 @ 22:38):    No growth    Culture - Blood (collected 21 @ 21:56)  Source: .Blood Blood-Peripheral  Preliminary Report (21 @ 22:02):    No growth to date.    Culture - Blood (collected 21 @ 21:56)  Source: .Blood Blood-Catheter  Preliminary Report (21 @ 22:02):    No growth to date.    Culture - Group A Streptococcus (collected 21 @ 17:32)  Source: .Throat Throat  Final Report (21 @ 08:04):    No Streptococcus pyogenes (Group A) isolated    Culture - Group A Streptococcus (collected 21 @ 14:58)  Source: .Throat Throat  Final Report (21 @ 08:00):    No Streptococcus pyogenes (Group A) isolated    Culture - Blood (collected 21 @ 09:16)  Source: .Blood Blood-Venous  Preliminary Report (21 @ 10:01):    No growth to date.    Culture - Blood (collected 21 @ 09:16)  Source: .Blood Blood-Peripheral  Preliminary Report (21 @ 10:01):    No growth to date.    Culture - Urine (collected 21 @ 01:14)  Source: Clean Catch Clean Catch (Midstream)  Final Report (21 @ 21:12):    No growth    Culture - Blood (collected 21 @ 17:15)  Source: .Blood Blood  Final Report (21 @ 18:00):    No Growth Final    Culture - Blood (collected 21 @ 17:15)  Source: .Blood Blood  Final Report (21 @ 18:00):    No Growth Final    Rapid RVP Result: NotDetec ( @ 13:37)    RADIOLOGY:  imaging below personally reviewed and agree with findings    CT Neck Soft Tissue w/wo IV Cont (21 @ 12:00) >  IMPRESSION: Moderate mucosal thickening within the LEFT maxillary, BILATERAL ethmoid and frontal sinuses. Minimal increased density seen within the BILATERAL frontal sinuses which may reflect inspissated secretions or possibly fungal material, less likely hemorrhage.   Minimal reactive lymph nodes noted..    CT Abdomen and Pelvis w/ IV Cont (21 @ 20:04) >  IMPRESSION: No CT evidence for source of infection in the chest, abdomen or pelvis.     Xray Chest 1 View- PORTABLE-Urgent (Xray Chest 1 View- PORTABLE-Urgent .) (21 @ 14:08) >  IMPRESSION:  No active pulmonary disease.

## 2022-01-05 NOTE — PROGRESS NOTE ADULT - PROBLEM SELECTOR PROBLEM 4
Prophylactic measure Hpi Title: Evaluation of Skin Lesions Have Your Spot(S) Been Treated In The Past?: has not been treated

## 2022-01-06 LAB
ALBUMIN SERPL ELPH-MCNC: 4 G/DL — SIGNIFICANT CHANGE UP (ref 3.3–5)
ALP SERPL-CCNC: 63 U/L — SIGNIFICANT CHANGE UP (ref 40–120)
ALT FLD-CCNC: 71 U/L — HIGH (ref 10–45)
ANION GAP SERPL CALC-SCNC: 12 MMOL/L — SIGNIFICANT CHANGE UP (ref 5–17)
APTT BLD: 23.9 SEC — LOW (ref 27.5–35.5)
AST SERPL-CCNC: 17 U/L — SIGNIFICANT CHANGE UP (ref 10–40)
BILIRUB SERPL-MCNC: 1.1 MG/DL — SIGNIFICANT CHANGE UP (ref 0.2–1.2)
BUN SERPL-MCNC: 22 MG/DL — SIGNIFICANT CHANGE UP (ref 7–23)
CALCIUM SERPL-MCNC: 8.7 MG/DL — SIGNIFICANT CHANGE UP (ref 8.4–10.5)
CHLORIDE SERPL-SCNC: 104 MMOL/L — SIGNIFICANT CHANGE UP (ref 96–108)
CO2 SERPL-SCNC: 22 MMOL/L — SIGNIFICANT CHANGE UP (ref 22–31)
CREAT SERPL-MCNC: 0.71 MG/DL — SIGNIFICANT CHANGE UP (ref 0.5–1.3)
FIBRINOGEN PPP-MCNC: 575 MG/DL — HIGH (ref 290–520)
GLUCOSE SERPL-MCNC: 126 MG/DL — HIGH (ref 70–99)
HCT VFR BLD CALC: 19.1 % — CRITICAL LOW (ref 39–50)
HGB BLD-MCNC: 7 G/DL — CRITICAL LOW (ref 13–17)
INR BLD: 1.12 RATIO — SIGNIFICANT CHANGE UP (ref 0.88–1.16)
LDH SERPL L TO P-CCNC: 152 U/L — SIGNIFICANT CHANGE UP (ref 50–242)
MAGNESIUM SERPL-MCNC: 1.8 MG/DL — SIGNIFICANT CHANGE UP (ref 1.6–2.6)
MCHC RBC-ENTMCNC: 30.8 PG — SIGNIFICANT CHANGE UP (ref 27–34)
MCHC RBC-ENTMCNC: 36.6 GM/DL — HIGH (ref 32–36)
MCV RBC AUTO: 84.1 FL — SIGNIFICANT CHANGE UP (ref 80–100)
NRBC # BLD: 20 /100 WBCS — HIGH (ref 0–0)
PHOSPHATE SERPL-MCNC: 3 MG/DL — SIGNIFICANT CHANGE UP (ref 2.5–4.5)
PLATELET # BLD AUTO: 2 K/UL — CRITICAL LOW (ref 150–400)
POTASSIUM SERPL-MCNC: 4.1 MMOL/L — SIGNIFICANT CHANGE UP (ref 3.5–5.3)
POTASSIUM SERPL-SCNC: 4.1 MMOL/L — SIGNIFICANT CHANGE UP (ref 3.5–5.3)
PROT SERPL-MCNC: 6.7 G/DL — SIGNIFICANT CHANGE UP (ref 6–8.3)
PROTHROM AB SERPL-ACNC: 13.4 SEC — SIGNIFICANT CHANGE UP (ref 10.6–13.6)
RBC # BLD: 2.27 M/UL — LOW (ref 4.2–5.8)
RBC # FLD: 12.3 % — SIGNIFICANT CHANGE UP (ref 10.3–14.5)
SODIUM SERPL-SCNC: 138 MMOL/L — SIGNIFICANT CHANGE UP (ref 135–145)
URATE SERPL-MCNC: 2.6 MG/DL — LOW (ref 3.4–8.8)
WBC # BLD: <0.1 K/UL — CRITICAL LOW (ref 3.8–10.5)
WBC # FLD AUTO: <0.1 K/UL — CRITICAL LOW (ref 3.8–10.5)

## 2022-01-06 PROCEDURE — 99233 SBSQ HOSP IP/OBS HIGH 50: CPT | Mod: GC

## 2022-01-06 RX ADMIN — ATOVAQUONE 1500 MILLIGRAM(S): 750 SUSPENSION ORAL at 12:05

## 2022-01-06 RX ADMIN — Medication 650 MILLIGRAM(S): at 10:53

## 2022-01-06 RX ADMIN — Medication 50 MILLIGRAM(S): at 10:53

## 2022-01-06 RX ADMIN — Medication 15 MILLILITER(S): at 21:14

## 2022-01-06 RX ADMIN — Medication 650 MILLIGRAM(S): at 12:03

## 2022-01-06 RX ADMIN — CEFEPIME 100 MILLIGRAM(S): 1 INJECTION, POWDER, FOR SOLUTION INTRAMUSCULAR; INTRAVENOUS at 15:01

## 2022-01-06 RX ADMIN — Medication 100 MILLIGRAM(S): at 15:00

## 2022-01-06 RX ADMIN — ELTROMBOPAG OLAMINE 150 MILLIGRAM(S): 50 TABLET, FILM COATED ORAL at 12:05

## 2022-01-06 RX ADMIN — CEFEPIME 100 MILLIGRAM(S): 1 INJECTION, POWDER, FOR SOLUTION INTRAMUSCULAR; INTRAVENOUS at 06:00

## 2022-01-06 RX ADMIN — Medication 650 MILLIGRAM(S): at 15:00

## 2022-01-06 RX ADMIN — Medication 25 MILLIGRAM(S): at 10:53

## 2022-01-06 RX ADMIN — ONDANSETRON 8 MILLIGRAM(S): 8 TABLET, FILM COATED ORAL at 06:21

## 2022-01-06 RX ADMIN — FLUCONAZOLE 200 MILLIGRAM(S): 150 TABLET ORAL at 12:05

## 2022-01-06 RX ADMIN — CYCLOSPORINE 325 MILLIGRAM(S): 100 CAPSULE ORAL at 17:37

## 2022-01-06 RX ADMIN — CEFEPIME 100 MILLIGRAM(S): 1 INJECTION, POWDER, FOR SOLUTION INTRAMUSCULAR; INTRAVENOUS at 21:14

## 2022-01-06 RX ADMIN — Medication 15 MILLILITER(S): at 06:18

## 2022-01-06 RX ADMIN — CYCLOSPORINE 325 MILLIGRAM(S): 100 CAPSULE ORAL at 06:14

## 2022-01-06 RX ADMIN — CHLORHEXIDINE GLUCONATE 1 APPLICATION(S): 213 SOLUTION TOPICAL at 08:36

## 2022-01-06 RX ADMIN — Medication 400 MILLIGRAM(S): at 21:13

## 2022-01-06 RX ADMIN — PANTOPRAZOLE SODIUM 40 MILLIGRAM(S): 20 TABLET, DELAYED RELEASE ORAL at 06:01

## 2022-01-06 RX ADMIN — SODIUM CHLORIDE 50 MILLILITER(S): 9 INJECTION INTRAMUSCULAR; INTRAVENOUS; SUBCUTANEOUS at 12:06

## 2022-01-06 RX ADMIN — Medication 15 MILLILITER(S): at 17:37

## 2022-01-06 RX ADMIN — Medication 400 MILLIGRAM(S): at 15:00

## 2022-01-06 RX ADMIN — Medication 400 MILLIGRAM(S): at 06:01

## 2022-01-06 RX ADMIN — Medication 50 MILLIGRAM(S): at 14:59

## 2022-01-06 RX ADMIN — FAMOTIDINE 20 MILLIGRAM(S): 10 INJECTION INTRAVENOUS at 12:05

## 2022-01-06 RX ADMIN — Medication 15 MILLILITER(S): at 12:05

## 2022-01-06 RX ADMIN — Medication 650 MILLIGRAM(S): at 15:04

## 2022-01-06 RX ADMIN — SODIUM CHLORIDE 50 MILLILITER(S): 9 INJECTION INTRAMUSCULAR; INTRAVENOUS; SUBCUTANEOUS at 06:18

## 2022-01-06 NOTE — PROGRESS NOTE ADULT - ATTENDING COMMENTS
36M with Aplastic Anemia diagnosed April 2020.  Underwent treatment with equine ATG, Cyclosporine, and Promacta.  Went into remission (BM bx 5/2021 with normal morphology and cellularity).  Promacta stopped May 2021.  Cyclosporin stopped August 2021.  By September, platelets started to drop.  Now with pancytopenia and neutropenic fever.  No obvious focus of infection. On cefepime, diflucan, acyclovir and mepron.  On cyclosporine 300 mg BID (started on 12/26/21), first level on 12/29/21 --197. increased dose to 325mg bid, repeat level 230. Receiving rATG day 3 and continues with prednisone and Promacta.      JACKELINE CHEW MD 36M with Aplastic Anemia diagnosed April 2020.  Underwent treatment with equine ATG, Cyclosporine, and Promacta.  Went into remission (BM bx 5/2021 with normal morphology and cellularity).  Promacta stopped May 2021.  Cyclosporin stopped August 2021.  By September, platelets started to drop.  Now with pancytopenia and neutropenic fever.  No obvious focus of infection. On cefepime, diflucan, acyclovir and mepron.  On cyclosporine 300 mg BID (started on 12/26/21), first level on 12/29/21 --197. increased dose to 325mg bid, repeat level 230. Receiving rATG day 3 and continues with prednisone and Promacta. 36M with Aplastic Anemia diagnosed April 2020.  Underwent treatment with equine ATG, Cyclosporine, and Promacta.  Went into remission (BM bx 5/2021 with normal morphology and cellularity).  Promacta stopped May 2021.  Cyclosporin stopped August 2021.  By September, platelets started to drop.  Now with pancytopenia and neutropenic fever.  No obvious focus of infection. On cefepime, diflucan, acyclovir and mepron.  On cyclosporine 300 mg BID (started on 12/26/21), first level on 12/29/21 --197 increased dose to 325mg bid, repeat level 230.   Today is cyclosporine day 10  Completed rATG and continues with prednisone and Promacta.  Doing well, no complaints.

## 2022-01-06 NOTE — PROGRESS NOTE ADULT - PROBLEM SELECTOR PLAN 2
Neutropenic, afebrile   continue cefepime , diflucan, Acyclovir for now  throat culture (-). MRSA (-)  CT CAP  to look for source of infection- No CT evidence for source of infection in the chest, abdomen or pelvis.  12/24- Staph aureus PCR detected, will start Vanco 1 gm Q 12 hrs stopped 12/26. CT maxillo facial did not reveal any abscess.   1/3 Started Mepron for ppx  1/5 if fever recurs will discontinue Diflucan and start voriconazole as per ID  ID following

## 2022-01-06 NOTE — PROGRESS NOTE ADULT - SUBJECTIVE AND OBJECTIVE BOX
Diagnosis: Relapsed aplastic anemia    Protocol/Chemo Regimen:  Rabbit ATG/Cyclosporine/prednisone/Promacta     Day: D11 CSA / Rabbit ATG day 5  (D2 suspended due to reaction; resumed on 1/3)     Pt endorsed:    Review of Systems:    Pain scale:                                        Location:    Diet: regular    Allergies: No Known Allergies    ANTIMICROBIALS  acyclovir   Oral Tab/Cap 400 milliGRAM(s) Oral every 8 hours  atovaquone  Suspension 1500 milliGRAM(s) Oral daily  cefepime   IVPB 2000 milliGRAM(s) IV Intermittent every 8 hours  fluconAZOLE   Tablet 200 milliGRAM(s) Oral daily      HEME/ONC MEDICATIONS  eltrombopag 150 milliGRAM(s) Oral daily      STANDING MEDICATIONS  acetaminophen     Tablet .. 650 milliGRAM(s) Oral daily  antithymocyte globulin rabbit IVPB 350 milliGRAM(s) IV Intermittent daily  Biotene Dry Mouth Oral Rinse 15 milliLiter(s) Swish and Spit four times a day  chlorhexidine 2% Cloths 1 Application(s) Topical <User Schedule>  cycloSPORINE  (SandIMMUNE) 325 milliGRAM(s) Oral every 12 hours  diphenhydrAMINE 50 milliGRAM(s) Oral daily  famotidine    Tablet 20 milliGRAM(s) Oral daily  FIRST- Mouthwash  BLM 10 milliLiter(s) Swish and Spit four times a day  lidocaine 2% Viscous 2 milliLiter(s) Oral four times a day  methylPREDNISolone sodium succinate IVPB 500 milliGRAM(s) IV Intermittent once  pantoprazole    Tablet 40 milliGRAM(s) Oral before breakfast  predniSONE   Tablet 100 milliGRAM(s) Oral daily  sodium chloride 0.65% Nasal 1 Spray(s) Both Nostrils four times a day  sodium chloride 0.9%. 1000 milliLiter(s) IV Continuous <Continuous>      PRN MEDICATIONS  acetaminophen     Tablet .. 650 milliGRAM(s) Oral every 6 hours PRN  acetaminophen   IVPB .. 1000 milliGRAM(s) IV Intermittent once PRN  aluminum hydroxide/magnesium hydroxide/simethicone Suspension 30 milliLiter(s) Oral every 4 hours PRN  calcium carbonate    500 mG (Tums) Chewable 1 Tablet(s) Chew every 4 hours PRN  diphenhydrAMINE 25 milliGRAM(s) Oral every 12 hours PRN  diphenhydrAMINE Injectable 25 milliGRAM(s) IV Push once PRN  EPINEPHrine     1 mG/mL Injectable 0.3 milliGRAM(s) IntraMuscular once PRN  hydrocortisone sodium succinate Injectable 50 milliGRAM(s) IV Push every 12 hours PRN  metoclopramide Injectable 10 milliGRAM(s) IV Push every 6 hours PRN  ondansetron Injectable 8 milliGRAM(s) IV Push every 8 hours PRN      Vital Signs Last 24 Hrs  T(C): 36.1 (06 Jan 2022 01:00), Max: 37 (05 Jan 2022 15:45)  T(F): 97 (06 Jan 2022 01:00), Max: 98.6 (05 Jan 2022 15:45)  HR: 76 (06 Jan 2022 06:30) (63 - 101)  BP: 149/81 (06 Jan 2022 06:30) (106/64 - 156/95)  RR: 18 (06 Jan 2022 06:30) (18 - 19)  SpO2: 96% (06 Jan 2022 06:30) (95% - 99%)    PHYSICAL EXAM  General: adult in NAD  HEENT: clear oropharynx  CV: normal S1/S2 RRR  Lungs: clear to auscultation, no wheezes, no rales  Abdomen: soft non-tender non-distended  Ext: no edema  Skin: no rash  Neuro: alert and oriented X 4  Central Line: RUE PICC  c/d/i    LABS:    Blood Cultures:                  RADIOLOGY & ADDITIONAL STUDIES:         Diagnosis: Relapsed aplastic anemia    Protocol/Chemo Regimen:  Rabbit ATG/Cyclosporine/prednisone/Promacta     Day: D11 CSA / Rabbit ATG day 5  (D2 suspended due to reaction; resumed on 1/3)     Pt endorsed:    Review of Systems:    Pain scale:                                        Location:    Diet: regular    Allergies: No Known Allergies    ANTIMICROBIALS  acyclovir   Oral Tab/Cap 400 milliGRAM(s) Oral every 8 hours  atovaquone  Suspension 1500 milliGRAM(s) Oral daily  cefepime   IVPB 2000 milliGRAM(s) IV Intermittent every 8 hours  fluconAZOLE   Tablet 200 milliGRAM(s) Oral daily      HEME/ONC MEDICATIONS  eltrombopag 150 milliGRAM(s) Oral daily      STANDING MEDICATIONS  acetaminophen     Tablet .. 650 milliGRAM(s) Oral daily  antithymocyte globulin rabbit IVPB 350 milliGRAM(s) IV Intermittent daily  Biotene Dry Mouth Oral Rinse 15 milliLiter(s) Swish and Spit four times a day  chlorhexidine 2% Cloths 1 Application(s) Topical <User Schedule>  cycloSPORINE  (SandIMMUNE) 325 milliGRAM(s) Oral every 12 hours  diphenhydrAMINE 50 milliGRAM(s) Oral daily  famotidine    Tablet 20 milliGRAM(s) Oral daily  FIRST- Mouthwash  BLM 10 milliLiter(s) Swish and Spit four times a day  lidocaine 2% Viscous 2 milliLiter(s) Oral four times a day  methylPREDNISolone sodium succinate IVPB 500 milliGRAM(s) IV Intermittent once  pantoprazole    Tablet 40 milliGRAM(s) Oral before breakfast  predniSONE   Tablet 100 milliGRAM(s) Oral daily  sodium chloride 0.65% Nasal 1 Spray(s) Both Nostrils four times a day  sodium chloride 0.9%. 1000 milliLiter(s) IV Continuous <Continuous>      PRN MEDICATIONS  acetaminophen     Tablet .. 650 milliGRAM(s) Oral every 6 hours PRN  acetaminophen   IVPB .. 1000 milliGRAM(s) IV Intermittent once PRN  aluminum hydroxide/magnesium hydroxide/simethicone Suspension 30 milliLiter(s) Oral every 4 hours PRN  calcium carbonate    500 mG (Tums) Chewable 1 Tablet(s) Chew every 4 hours PRN  diphenhydrAMINE 25 milliGRAM(s) Oral every 12 hours PRN  diphenhydrAMINE Injectable 25 milliGRAM(s) IV Push once PRN  EPINEPHrine     1 mG/mL Injectable 0.3 milliGRAM(s) IntraMuscular once PRN  hydrocortisone sodium succinate Injectable 50 milliGRAM(s) IV Push every 12 hours PRN  metoclopramide Injectable 10 milliGRAM(s) IV Push every 6 hours PRN  ondansetron Injectable 8 milliGRAM(s) IV Push every 8 hours PRN      Vital Signs Last 24 Hrs  T(C): 36.1 (06 Jan 2022 01:00), Max: 37 (05 Jan 2022 15:45)  T(F): 97 (06 Jan 2022 01:00), Max: 98.6 (05 Jan 2022 15:45)  HR: 76 (06 Jan 2022 06:30) (63 - 101)  BP: 149/81 (06 Jan 2022 06:30) (106/64 - 156/95)  RR: 18 (06 Jan 2022 06:30) (18 - 19)  SpO2: 96% (06 Jan 2022 06:30) (95% - 99%)    PHYSICAL EXAM  General: adult in NAD  HEENT: clear oropharynx  CV: normal S1/S2 RRR  Lungs: clear to auscultation, no wheezes, no rales  Abdomen: soft non-tender non-distended  Ext: no edema  Skin: no rash  Neuro: alert and oriented X 4  Central Line: RUE PICC  c/d/i    Cultures:    Culture - Urine (collected 12-30-21 @ 00:57)  Source: Clean Catch Clean Catch (Midstream)  Final Report (12-30-21 @ 22:38):    No growth    Culture - Blood (collected 12-29-21 @ 21:56)  Source: .Blood Blood-Peripheral  Preliminary Report (12-30-21 @ 22:02):    No growth to date.    Culture - Blood (collected 12-29-21 @ 21:56)  Source: .Blood Blood-Catheter  Preliminary Report (12-30-21 @ 22:02):    No growth to date.    Culture - Group A Streptococcus (collected 12-23-21 @ 17:32)  Source: .Throat Throat  Final Report (12-25-21 @ 08:04):    No Streptococcus pyogenes (Group A) isolated    Culture - Group A Streptococcus (collected 12-23-21 @ 14:58)  Source: .Throat Throat  Final Report (12-25-21 @ 08:00):    No Streptococcus pyogenes (Group A) isolated    Culture - Blood (collected 12-23-21 @ 09:16)  Source: .Blood Blood-Venous  Preliminary Report (12-24-21 @ 10:01):    No growth to date.    Culture - Blood (collected 12-23-21 @ 09:16)  Source: .Blood Blood-Peripheral  Preliminary Report (12-24-21 @ 10:01):    No growth to date.    Culture - Urine (collected 12-22-21 @ 01:14)  Source: Clean Catch Clean Catch (Midstream)  Final Report (12-22-21 @ 21:12):    No growth    Culture - Blood (collected 12-21-21 @ 17:15)  Source: .Blood Blood  Final Report (12-26-21 @ 18:00):    No Growth Final    Culture - Blood (collected 12-21-21 @ 17:15)  Source: .Blood Blood  Final Report (12-26-21 @ 18:00):    No Growth Final    Rapid RVP Result: NotDetec (12-21 @ 13:37)    LABS:                   RADIOLOGY & ADDITIONAL STUDIES:         Diagnosis: Relapsed aplastic anemia    Protocol/Chemo Regimen:  Rabbit ATG/Cyclosporine/prednisone/Promacta     Day: D11 CSA / Rabbit ATG day 5  (D2 suspended due to reaction; resumed on 1/3)     Pt endorsed:    Review of Systems:    Pain scale:                                        Location:    Diet: regular    Allergies: No Known Allergies    ANTIMICROBIALS  acyclovir   Oral Tab/Cap 400 milliGRAM(s) Oral every 8 hours  atovaquone  Suspension 1500 milliGRAM(s) Oral daily  cefepime   IVPB 2000 milliGRAM(s) IV Intermittent every 8 hours  fluconAZOLE   Tablet 200 milliGRAM(s) Oral daily      HEME/ONC MEDICATIONS  eltrombopag 150 milliGRAM(s) Oral daily      STANDING MEDICATIONS  acetaminophen     Tablet .. 650 milliGRAM(s) Oral daily  antithymocyte globulin rabbit IVPB 350 milliGRAM(s) IV Intermittent daily  Biotene Dry Mouth Oral Rinse 15 milliLiter(s) Swish and Spit four times a day  chlorhexidine 2% Cloths 1 Application(s) Topical <User Schedule>  cycloSPORINE  (SandIMMUNE) 325 milliGRAM(s) Oral every 12 hours  diphenhydrAMINE 50 milliGRAM(s) Oral daily  famotidine    Tablet 20 milliGRAM(s) Oral daily  FIRST- Mouthwash  BLM 10 milliLiter(s) Swish and Spit four times a day  lidocaine 2% Viscous 2 milliLiter(s) Oral four times a day  methylPREDNISolone sodium succinate IVPB 500 milliGRAM(s) IV Intermittent once  pantoprazole    Tablet 40 milliGRAM(s) Oral before breakfast  predniSONE   Tablet 100 milliGRAM(s) Oral daily  sodium chloride 0.65% Nasal 1 Spray(s) Both Nostrils four times a day  sodium chloride 0.9%. 1000 milliLiter(s) IV Continuous <Continuous>      PRN MEDICATIONS  acetaminophen     Tablet .. 650 milliGRAM(s) Oral every 6 hours PRN  acetaminophen   IVPB .. 1000 milliGRAM(s) IV Intermittent once PRN  aluminum hydroxide/magnesium hydroxide/simethicone Suspension 30 milliLiter(s) Oral every 4 hours PRN  calcium carbonate    500 mG (Tums) Chewable 1 Tablet(s) Chew every 4 hours PRN  diphenhydrAMINE 25 milliGRAM(s) Oral every 12 hours PRN  diphenhydrAMINE Injectable 25 milliGRAM(s) IV Push once PRN  EPINEPHrine     1 mG/mL Injectable 0.3 milliGRAM(s) IntraMuscular once PRN  hydrocortisone sodium succinate Injectable 50 milliGRAM(s) IV Push every 12 hours PRN  metoclopramide Injectable 10 milliGRAM(s) IV Push every 6 hours PRN  ondansetron Injectable 8 milliGRAM(s) IV Push every 8 hours PRN      Vital Signs Last 24 Hrs  T(C): 36.1 (06 Jan 2022 01:00), Max: 37 (05 Jan 2022 15:45)  T(F): 97 (06 Jan 2022 01:00), Max: 98.6 (05 Jan 2022 15:45)  HR: 76 (06 Jan 2022 06:30) (63 - 101)  BP: 149/81 (06 Jan 2022 06:30) (106/64 - 156/95)  RR: 18 (06 Jan 2022 06:30) (18 - 19)  SpO2: 96% (06 Jan 2022 06:30) (95% - 99%)      PHYSICAL EXAM  General: adult in NAD  HEENT: clear oropharynx  CV: normal S1/S2 RRR  Lungs: clear to auscultation, no wheezes, no rales  Abdomen: soft non-tender non-distended  Ext: no edema  Skin: no rash  Neuro: alert and oriented X 4  Central Line: RUE PICC  c/d/i    Cultures:    Culture - Urine (collected 12-30-21 @ 00:57)  Source: Clean Catch Clean Catch (Midstream)  Final Report (12-30-21 @ 22:38):    No growth    Culture - Blood (collected 12-29-21 @ 21:56)  Source: .Blood Blood-Peripheral  Preliminary Report (12-30-21 @ 22:02):    No growth to date.    Culture - Blood (collected 12-29-21 @ 21:56)  Source: .Blood Blood-Catheter  Preliminary Report (12-30-21 @ 22:02):    No growth to date.    Culture - Group A Streptococcus (collected 12-23-21 @ 17:32)  Source: .Throat Throat  Final Report (12-25-21 @ 08:04):    No Streptococcus pyogenes (Group A) isolated    Culture - Group A Streptococcus (collected 12-23-21 @ 14:58)  Source: .Throat Throat  Final Report (12-25-21 @ 08:00):    No Streptococcus pyogenes (Group A) isolated    Culture - Blood (collected 12-23-21 @ 09:16)  Source: .Blood Blood-Venous  Preliminary Report (12-24-21 @ 10:01):    No growth to date.    Culture - Blood (collected 12-23-21 @ 09:16)  Source: .Blood Blood-Peripheral  Preliminary Report (12-24-21 @ 10:01):    No growth to date.    Culture - Urine (collected 12-22-21 @ 01:14)  Source: Clean Catch Clean Catch (Midstream)  Final Report (12-22-21 @ 21:12):    No growth    Culture - Blood (collected 12-21-21 @ 17:15)  Source: .Blood Blood  Final Report (12-26-21 @ 18:00):    No Growth Final    Culture - Blood (collected 12-21-21 @ 17:15)  Source: .Blood Blood  Final Report (12-26-21 @ 18:00):    No Growth Final    Rapid RVP Result: NotDetec (12-21 @ 13:37)    LABS:                      7.0    0.15  )-----------( 19       ( 05 Jan 2022 07:18 )             19.1     05 Jan 2022 07:16    137    |  104    |  22     ----------------------------<  146    4.4     |  19     |  0.76     Ca    9.0        05 Jan 2022 07:16  Phos  3.6       05 Jan 2022 07:16  Mg     1.8       05 Jan 2022 07:16    TPro  7.0    /  Alb  4.2    /  TBili  1.0    /  DBili  x      /  AST  19     /  ALT  83     /  AlkPhos  62     05 Jan 2022 07:16    PT/INR - ( 05 Jan 2022 07:16 )   PT: 13.7 sec;   INR: 1.15 ratio    PTT - ( 05 Jan 2022 07:16 )  PTT:27.3 sec    LIVER FUNCTIONS - ( 05 Jan 2022 07:16 )  Alb: 4.2 g/dL / Pro: 7.0 g/dL / ALK PHOS: 62 U/L / ALT: 83 U/L / AST: 19 U/L / GGT: x           RADIOLOGY & ADDITIONAL STUDIES:  from: Xray Chest 1 View- PORTABLE-Urgent (Xray Chest 1 View- PORTABLE-Urgent .) (12.30.21 @ 11:22)   FINDINGS:  Clear lungs. No pleural effusion or pneumothorax.  The heart is normal in size. Right-sided central venous catheter   unchanged prior exam.  No acute osseous findings.    IMPRESSION:  Clear lungs.           Diagnosis: Relapsed aplastic anemia    Protocol/Chemo Regimen:  Rabbit ATG/Cyclosporine/prednisone/Promacta     Day: D11 CSA / Rabbit ATG day 5  (D2 suspended due to reaction; resumed on 1/3)     Pt endorsed:    Review of Systems:    Pain scale:                                        Location:    Diet: regular    Allergies: No Known Allergies    ANTIMICROBIALS  acyclovir   Oral Tab/Cap 400 milliGRAM(s) Oral every 8 hours  atovaquone  Suspension 1500 milliGRAM(s) Oral daily  cefepime   IVPB 2000 milliGRAM(s) IV Intermittent every 8 hours  fluconAZOLE   Tablet 200 milliGRAM(s) Oral daily      HEME/ONC MEDICATIONS  eltrombopag 150 milliGRAM(s) Oral daily      STANDING MEDICATIONS  acetaminophen     Tablet .. 650 milliGRAM(s) Oral daily  antithymocyte globulin rabbit IVPB 350 milliGRAM(s) IV Intermittent daily  Biotene Dry Mouth Oral Rinse 15 milliLiter(s) Swish and Spit four times a day  chlorhexidine 2% Cloths 1 Application(s) Topical <User Schedule>  cycloSPORINE  (SandIMMUNE) 325 milliGRAM(s) Oral every 12 hours  diphenhydrAMINE 50 milliGRAM(s) Oral daily  famotidine    Tablet 20 milliGRAM(s) Oral daily  FIRST- Mouthwash  BLM 10 milliLiter(s) Swish and Spit four times a day  lidocaine 2% Viscous 2 milliLiter(s) Oral four times a day  methylPREDNISolone sodium succinate IVPB 500 milliGRAM(s) IV Intermittent once  pantoprazole    Tablet 40 milliGRAM(s) Oral before breakfast  predniSONE   Tablet 100 milliGRAM(s) Oral daily  sodium chloride 0.65% Nasal 1 Spray(s) Both Nostrils four times a day  sodium chloride 0.9%. 1000 milliLiter(s) IV Continuous <Continuous>      PRN MEDICATIONS  acetaminophen     Tablet .. 650 milliGRAM(s) Oral every 6 hours PRN  acetaminophen   IVPB .. 1000 milliGRAM(s) IV Intermittent once PRN  aluminum hydroxide/magnesium hydroxide/simethicone Suspension 30 milliLiter(s) Oral every 4 hours PRN  calcium carbonate    500 mG (Tums) Chewable 1 Tablet(s) Chew every 4 hours PRN  diphenhydrAMINE 25 milliGRAM(s) Oral every 12 hours PRN  diphenhydrAMINE Injectable 25 milliGRAM(s) IV Push once PRN  EPINEPHrine     1 mG/mL Injectable 0.3 milliGRAM(s) IntraMuscular once PRN  hydrocortisone sodium succinate Injectable 50 milliGRAM(s) IV Push every 12 hours PRN  metoclopramide Injectable 10 milliGRAM(s) IV Push every 6 hours PRN  ondansetron Injectable 8 milliGRAM(s) IV Push every 8 hours PRN      Vital Signs Last 24 Hrs  T(C): 36.1 (06 Jan 2022 01:00), Max: 37 (05 Jan 2022 15:45)  T(F): 97 (06 Jan 2022 01:00), Max: 98.6 (05 Jan 2022 15:45)  HR: 76 (06 Jan 2022 06:30) (63 - 101)  BP: 149/81 (06 Jan 2022 06:30) (106/64 - 156/95)  RR: 18 (06 Jan 2022 06:30) (18 - 19)  SpO2: 96% (06 Jan 2022 06:30) (95% - 99%)      PHYSICAL EXAM  General: adult in NAD  HEENT: clear oropharynx  CV: normal S1/S2 RRR  Lungs: clear to auscultation, no wheezes, no rales  Abdomen: soft non-tender non-distended  Ext: no edema  Skin: no rash  Neuro: alert and oriented X 4  Central Line: RUE PICC  c/d/i    Cultures:    Culture - Urine (collected 12-30-21 @ 00:57)  Source: Clean Catch Clean Catch (Midstream)  Final Report (12-30-21 @ 22:38):    No growth    Culture - Blood (collected 12-29-21 @ 21:56)  Source: .Blood Blood-Peripheral  Preliminary Report (12-30-21 @ 22:02):    No growth to date.    Culture - Blood (collected 12-29-21 @ 21:56)  Source: .Blood Blood-Catheter  Preliminary Report (12-30-21 @ 22:02):    No growth to date.    Culture - Group A Streptococcus (collected 12-23-21 @ 17:32)  Source: .Throat Throat  Final Report (12-25-21 @ 08:04):    No Streptococcus pyogenes (Group A) isolated    Culture - Group A Streptococcus (collected 12-23-21 @ 14:58)  Source: .Throat Throat  Final Report (12-25-21 @ 08:00):    No Streptococcus pyogenes (Group A) isolated    Culture - Blood (collected 12-23-21 @ 09:16)  Source: .Blood Blood-Venous  Preliminary Report (12-24-21 @ 10:01):    No growth to date.    Culture - Blood (collected 12-23-21 @ 09:16)  Source: .Blood Blood-Peripheral  Preliminary Report (12-24-21 @ 10:01):    No growth to date.    Culture - Urine (collected 12-22-21 @ 01:14)  Source: Clean Catch Clean Catch (Midstream)  Final Report (12-22-21 @ 21:12):    No growth    Culture - Blood (collected 12-21-21 @ 17:15)  Source: .Blood Blood  Final Report (12-26-21 @ 18:00):    No Growth Final    Culture - Blood (collected 12-21-21 @ 17:15)  Source: .Blood Blood  Final Report (12-26-21 @ 18:00):    No Growth Final    Rapid RVP Result: NotDetec (12-21 @ 13:37)    LABS:                               7.0    <0.10 )-----------( 2        ( 06 Jan 2022 06:37 )             19.1     06 Jan 2022 06:44    138    |  104    |  22     ----------------------------<  126    4.1     |  22     |  0.71     Ca    8.7        06 Jan 2022 06:44  Phos  3.0       06 Jan 2022 06:44  Mg     1.8       06 Jan 2022 06:44    TPro  6.7    /  Alb  4.0    /  TBili  1.1    /  DBili  x      /  AST  17     /  ALT  71     /  AlkPhos  63     06 Jan 2022 06:44    PT/INR - ( 06 Jan 2022 06:46 )   PT: 13.4 sec;   INR: 1.12 ratio    PTT - ( 06 Jan 2022 06:46 )  PTT:23.9 sec    LIVER FUNCTIONS - ( 06 Jan 2022 06:44 )  Alb: 4.0 g/dL / Pro: 6.7 g/dL / ALK PHOS: 63 U/L / ALT: 71 U/L / AST: 17 U/L / GGT: x             RADIOLOGY & ADDITIONAL STUDIES:  from: Xray Chest 1 View- PORTABLE-Urgent (Xray Chest 1 View- PORTABLE-Urgent .) (12.30.21 @ 11:22)   FINDINGS:  Clear lungs. No pleural effusion or pneumothorax.  The heart is normal in size. Right-sided central venous catheter   unchanged prior exam.  No acute osseous findings.    IMPRESSION:  Clear lungs.           Diagnosis: Relapsed aplastic anemia    Protocol/Chemo Regimen:  Rabbit ATG/Cyclosporine/prednisone/Promacta     Day: D11 CSA / Rabbit ATG day 5  (D2 suspended due to reaction; resumed on 1/3)     Pt endorsed: doing well, no complaints    Review of Systems: denies nausea, vomiting, diarrhea, chest pain, sob    Pain scale: 0                                           Diet: regular    Allergies: No Known Allergies    ANTIMICROBIALS  acyclovir   Oral Tab/Cap 400 milliGRAM(s) Oral every 8 hours  atovaquone  Suspension 1500 milliGRAM(s) Oral daily  cefepime   IVPB 2000 milliGRAM(s) IV Intermittent every 8 hours  fluconAZOLE   Tablet 200 milliGRAM(s) Oral daily      HEME/ONC MEDICATIONS  eltrombopag 150 milliGRAM(s) Oral daily      STANDING MEDICATIONS  acetaminophen     Tablet .. 650 milliGRAM(s) Oral daily  antithymocyte globulin rabbit IVPB 350 milliGRAM(s) IV Intermittent daily  Biotene Dry Mouth Oral Rinse 15 milliLiter(s) Swish and Spit four times a day  chlorhexidine 2% Cloths 1 Application(s) Topical <User Schedule>  cycloSPORINE  (SandIMMUNE) 325 milliGRAM(s) Oral every 12 hours  diphenhydrAMINE 50 milliGRAM(s) Oral daily  famotidine    Tablet 20 milliGRAM(s) Oral daily  FIRST- Mouthwash  BLM 10 milliLiter(s) Swish and Spit four times a day  lidocaine 2% Viscous 2 milliLiter(s) Oral four times a day  methylPREDNISolone sodium succinate IVPB 500 milliGRAM(s) IV Intermittent once  pantoprazole    Tablet 40 milliGRAM(s) Oral before breakfast  predniSONE   Tablet 100 milliGRAM(s) Oral daily  sodium chloride 0.65% Nasal 1 Spray(s) Both Nostrils four times a day  sodium chloride 0.9%. 1000 milliLiter(s) IV Continuous <Continuous>      PRN MEDICATIONS  acetaminophen     Tablet .. 650 milliGRAM(s) Oral every 6 hours PRN  acetaminophen   IVPB .. 1000 milliGRAM(s) IV Intermittent once PRN  aluminum hydroxide/magnesium hydroxide/simethicone Suspension 30 milliLiter(s) Oral every 4 hours PRN  calcium carbonate    500 mG (Tums) Chewable 1 Tablet(s) Chew every 4 hours PRN  diphenhydrAMINE 25 milliGRAM(s) Oral every 12 hours PRN  diphenhydrAMINE Injectable 25 milliGRAM(s) IV Push once PRN  EPINEPHrine     1 mG/mL Injectable 0.3 milliGRAM(s) IntraMuscular once PRN  hydrocortisone sodium succinate Injectable 50 milliGRAM(s) IV Push every 12 hours PRN  metoclopramide Injectable 10 milliGRAM(s) IV Push every 6 hours PRN  ondansetron Injectable 8 milliGRAM(s) IV Push every 8 hours PRN      Vital Signs Last 24 Hrs  T(C): 36.1 (06 Jan 2022 01:00), Max: 37 (05 Jan 2022 15:45)  T(F): 97 (06 Jan 2022 01:00), Max: 98.6 (05 Jan 2022 15:45)  HR: 76 (06 Jan 2022 06:30) (63 - 101)  BP: 149/81 (06 Jan 2022 06:30) (106/64 - 156/95)  RR: 18 (06 Jan 2022 06:30) (18 - 19)  SpO2: 96% (06 Jan 2022 06:30) (95% - 99%)      PHYSICAL EXAM  General: adult in NAD  HEENT: clear oropharynx  CV: normal S1/S2 RRR  Lungs: clear to auscultation, no wheezes, no rales  Abdomen: soft non-tender non-distended  Ext: no edema  Skin: no rash  Neuro: alert and oriented X 4  Central Line: RUE PICC  c/d/i    Cultures:    Culture - Urine (collected 12-30-21 @ 00:57)  Source: Clean Catch Clean Catch (Midstream)  Final Report (12-30-21 @ 22:38):    No growth    Culture - Blood (collected 12-29-21 @ 21:56)  Source: .Blood Blood-Peripheral  Preliminary Report (12-30-21 @ 22:02):    No growth to date.    Culture - Blood (collected 12-29-21 @ 21:56)  Source: .Blood Blood-Catheter  Preliminary Report (12-30-21 @ 22:02):    No growth to date.    Culture - Group A Streptococcus (collected 12-23-21 @ 17:32)  Source: .Throat Throat  Final Report (12-25-21 @ 08:04):    No Streptococcus pyogenes (Group A) isolated    Culture - Group A Streptococcus (collected 12-23-21 @ 14:58)  Source: .Throat Throat  Final Report (12-25-21 @ 08:00):    No Streptococcus pyogenes (Group A) isolated    Culture - Blood (collected 12-23-21 @ 09:16)  Source: .Blood Blood-Venous  Preliminary Report (12-24-21 @ 10:01):    No growth to date.    Culture - Blood (collected 12-23-21 @ 09:16)  Source: .Blood Blood-Peripheral  Preliminary Report (12-24-21 @ 10:01):    No growth to date.    Culture - Urine (collected 12-22-21 @ 01:14)  Source: Clean Catch Clean Catch (Midstream)  Final Report (12-22-21 @ 21:12):    No growth    Culture - Blood (collected 12-21-21 @ 17:15)  Source: .Blood Blood  Final Report (12-26-21 @ 18:00):    No Growth Final    Culture - Blood (collected 12-21-21 @ 17:15)  Source: .Blood Blood  Final Report (12-26-21 @ 18:00):    No Growth Final    Rapid RVP Result: NotDete (12-21 @ 13:37)    LABS:                               7.0    <0.10 )-----------( 2        ( 06 Jan 2022 06:37 )             19.1     06 Jan 2022 06:44    138    |  104    |  22     ----------------------------<  126    4.1     |  22     |  0.71     Ca    8.7        06 Jan 2022 06:44  Phos  3.0       06 Jan 2022 06:44  Mg     1.8       06 Jan 2022 06:44    TPro  6.7    /  Alb  4.0    /  TBili  1.1    /  DBili  x      /  AST  17     /  ALT  71     /  AlkPhos  63     06 Jan 2022 06:44    PT/INR - ( 06 Jan 2022 06:46 )   PT: 13.4 sec;   INR: 1.12 ratio    PTT - ( 06 Jan 2022 06:46 )  PTT:23.9 sec    LIVER FUNCTIONS - ( 06 Jan 2022 06:44 )  Alb: 4.0 g/dL / Pro: 6.7 g/dL / ALK PHOS: 63 U/L / ALT: 71 U/L / AST: 17 U/L / GGT: x             RADIOLOGY & ADDITIONAL STUDIES:  from: Xray Chest 1 View- PORTABLE-Urgent (Xray Chest 1 View- PORTABLE-Urgent .) (12.30.21 @ 11:22)   FINDINGS:  Clear lungs. No pleural effusion or pneumothorax.  The heart is normal in size. Right-sided central venous catheter   unchanged prior exam.  No acute osseous findings.    IMPRESSION:  Clear lungs.

## 2022-01-06 NOTE — PROGRESS NOTE ADULT - PROBLEM SELECTOR PLAN 1
Relapsed Aplastic Anemia   s/p hATG, CSA, and promacta with complete remission in 2020, BM bx on 11/9/20 revealed hypocellular marrow.  BMbx 12/2021 showing erythroid predominance, myeloid and erythroid maturation, and markedly decreased megakaryocytes c/w relapse  12/23-   transferred  to [facility] for rATG treatment  Monitor CBC with diff/lytes, transfuse and or replete PRN, Monitor weight, I and O, mouth care  12/26- Started Cyclosporine 300 mg PO Q 12 hrs , 12/27 test dose - no reaction noted  12/28 PICC placed  12/29 Started  Rabbit ATG/prednisone-Reaction to rATG- HELD on 12/30 due to reaction ; Continue promacta  1/3 Resumed  rabbit ATG/prednisone  1/5 discharge planning Relapsed Aplastic Anemia   s/p hATG, CSA, and promacta with complete remission in 2020, BM bx on 11/9/20 revealed hypocellular marrow.  BMbx 12/2021 showing erythroid predominance, myeloid and erythroid maturation, and markedly decreased megakaryocytes c/w relapse  12/23-   transferred  to [facility] for rATG treatment  Monitor CBC with diff/lytes, transfuse and or replete PRN, Monitor weight, I and O, mouth care  12/26- Started Cyclosporine 300 mg PO Q 12 hrs , 12/27 test dose - no reaction noted  12/28 PICC placed  12/29 Started  Rabbit ATG/prednisone-Reaction to rATG- HELD on 12/30 due to reaction ; Continue promacta  1/3 Resumed  rabbit ATG/prednisone  1/5 discharge planning  at [place] Relapsed Aplastic Anemia   s/p hATG, CSA, and promacta with complete remission in 2020, BM bx on 11/9/20 revealed hypocellular marrow.  BMbx 12/2021 showing erythroid predominance, myeloid and erythroid maturation, and markedly decreased megakaryocytes c/w relapse  12/23-   transferred  to [facility] for rATG treatment  Monitor CBC with diff/lytes, transfuse and or replete PRN, Monitor weight, I and O, mouth care  12/26- Started Cyclosporine 300 mg PO Q 12 hrs , 12/27 test dose - no reaction noted  12/28 PICC placed  12/29 Started  Rabbit ATG/prednisone-Reaction to rATG- HELD on 12/30 due to reaction ; Continue promacta  1/3 Resumed  rabbit ATG/prednisone  1/5 discharge planning  1/6 completing rATG today. PLTs x 1 abg. counts at clint.

## 2022-01-07 ENCOUNTER — APPOINTMENT (OUTPATIENT)
Dept: INFUSION THERAPY | Facility: HOSPITAL | Age: 37
End: 2022-01-07

## 2022-01-07 ENCOUNTER — RESULT REVIEW (OUTPATIENT)
Age: 37
End: 2022-01-07

## 2022-01-07 LAB
ALBUMIN SERPL ELPH-MCNC: 3.9 G/DL — SIGNIFICANT CHANGE UP (ref 3.3–5)
ALP SERPL-CCNC: 58 U/L — SIGNIFICANT CHANGE UP (ref 40–120)
ALT FLD-CCNC: 50 U/L — HIGH (ref 10–45)
ANION GAP SERPL CALC-SCNC: 13 MMOL/L — SIGNIFICANT CHANGE UP (ref 5–17)
AST SERPL-CCNC: 10 U/L — SIGNIFICANT CHANGE UP (ref 10–40)
BILIRUB SERPL-MCNC: 1.4 MG/DL — HIGH (ref 0.2–1.2)
BUN SERPL-MCNC: 23 MG/DL — SIGNIFICANT CHANGE UP (ref 7–23)
CALCIUM SERPL-MCNC: 9 MG/DL — SIGNIFICANT CHANGE UP (ref 8.4–10.5)
CHLORIDE SERPL-SCNC: 104 MMOL/L — SIGNIFICANT CHANGE UP (ref 96–108)
CO2 SERPL-SCNC: 22 MMOL/L — SIGNIFICANT CHANGE UP (ref 22–31)
CREAT SERPL-MCNC: 0.76 MG/DL — SIGNIFICANT CHANGE UP (ref 0.5–1.3)
CYCLOSPORINE SER-MCNC: 133 NG/ML — LOW (ref 150–400)
GLUCOSE SERPL-MCNC: 102 MG/DL — HIGH (ref 70–99)
HCT VFR BLD CALC: 16.8 % — CRITICAL LOW (ref 39–50)
HGB BLD-MCNC: 6.2 G/DL — CRITICAL LOW (ref 13–17)
LDH SERPL L TO P-CCNC: 140 U/L — SIGNIFICANT CHANGE UP (ref 50–242)
MAGNESIUM SERPL-MCNC: 1.7 MG/DL — SIGNIFICANT CHANGE UP (ref 1.6–2.6)
MCHC RBC-ENTMCNC: 30.8 PG — SIGNIFICANT CHANGE UP (ref 27–34)
MCHC RBC-ENTMCNC: 36.9 GM/DL — HIGH (ref 32–36)
MCV RBC AUTO: 83.6 FL — SIGNIFICANT CHANGE UP (ref 80–100)
NRBC # BLD: 14 /100 WBCS — HIGH (ref 0–0)
PHOSPHATE SERPL-MCNC: 3.6 MG/DL — SIGNIFICANT CHANGE UP (ref 2.5–4.5)
PLATELET # BLD AUTO: 19 K/UL — CRITICAL LOW (ref 150–400)
POTASSIUM SERPL-MCNC: 4.1 MMOL/L — SIGNIFICANT CHANGE UP (ref 3.5–5.3)
POTASSIUM SERPL-SCNC: 4.1 MMOL/L — SIGNIFICANT CHANGE UP (ref 3.5–5.3)
PROT SERPL-MCNC: 6.7 G/DL — SIGNIFICANT CHANGE UP (ref 6–8.3)
RBC # BLD: 2.01 M/UL — LOW (ref 4.2–5.8)
RBC # FLD: 12.1 % — SIGNIFICANT CHANGE UP (ref 10.3–14.5)
SODIUM SERPL-SCNC: 139 MMOL/L — SIGNIFICANT CHANGE UP (ref 135–145)
URATE SERPL-MCNC: 2.3 MG/DL — LOW (ref 3.4–8.8)
WBC # BLD: <0.1 K/UL — CRITICAL LOW (ref 3.8–10.5)
WBC # FLD AUTO: <0.1 K/UL — CRITICAL LOW (ref 3.8–10.5)

## 2022-01-07 PROCEDURE — 99233 SBSQ HOSP IP/OBS HIGH 50: CPT | Mod: GC

## 2022-01-07 RX ORDER — CYCLOSPORINE 100 MG/1
350 CAPSULE ORAL EVERY 12 HOURS
Refills: 0 | Status: DISCONTINUED | OUTPATIENT
Start: 2022-01-07 | End: 2022-01-07

## 2022-01-07 RX ORDER — CYCLOSPORINE 100 MG/1
350 CAPSULE ORAL EVERY 12 HOURS
Refills: 0 | Status: DISCONTINUED | OUTPATIENT
Start: 2022-01-07 | End: 2022-01-10

## 2022-01-07 RX ADMIN — Medication 650 MILLIGRAM(S): at 10:30

## 2022-01-07 RX ADMIN — CEFEPIME 100 MILLIGRAM(S): 1 INJECTION, POWDER, FOR SOLUTION INTRAMUSCULAR; INTRAVENOUS at 22:12

## 2022-01-07 RX ADMIN — Medication 15 MILLILITER(S): at 17:51

## 2022-01-07 RX ADMIN — Medication 100 MILLIGRAM(S): at 12:01

## 2022-01-07 RX ADMIN — Medication 50 MILLIGRAM(S): at 11:59

## 2022-01-07 RX ADMIN — Medication 400 MILLIGRAM(S): at 14:08

## 2022-01-07 RX ADMIN — FLUCONAZOLE 200 MILLIGRAM(S): 150 TABLET ORAL at 12:00

## 2022-01-07 RX ADMIN — ATOVAQUONE 1500 MILLIGRAM(S): 750 SUSPENSION ORAL at 12:00

## 2022-01-07 RX ADMIN — Medication 650 MILLIGRAM(S): at 09:20

## 2022-01-07 RX ADMIN — Medication 25 MILLIGRAM(S): at 12:01

## 2022-01-07 RX ADMIN — PANTOPRAZOLE SODIUM 40 MILLIGRAM(S): 20 TABLET, DELAYED RELEASE ORAL at 06:25

## 2022-01-07 RX ADMIN — Medication 400 MILLIGRAM(S): at 06:24

## 2022-01-07 RX ADMIN — CYCLOSPORINE 325 MILLIGRAM(S): 100 CAPSULE ORAL at 06:23

## 2022-01-07 RX ADMIN — FAMOTIDINE 20 MILLIGRAM(S): 10 INJECTION INTRAVENOUS at 12:00

## 2022-01-07 RX ADMIN — Medication 400 MILLIGRAM(S): at 22:07

## 2022-01-07 RX ADMIN — CYCLOSPORINE 350 MILLIGRAM(S): 100 CAPSULE ORAL at 17:46

## 2022-01-07 RX ADMIN — Medication 15 MILLILITER(S): at 12:10

## 2022-01-07 RX ADMIN — CEFEPIME 100 MILLIGRAM(S): 1 INJECTION, POWDER, FOR SOLUTION INTRAMUSCULAR; INTRAVENOUS at 06:24

## 2022-01-07 RX ADMIN — CEFEPIME 100 MILLIGRAM(S): 1 INJECTION, POWDER, FOR SOLUTION INTRAMUSCULAR; INTRAVENOUS at 14:06

## 2022-01-07 RX ADMIN — CHLORHEXIDINE GLUCONATE 1 APPLICATION(S): 213 SOLUTION TOPICAL at 12:10

## 2022-01-07 RX ADMIN — ELTROMBOPAG OLAMINE 150 MILLIGRAM(S): 50 TABLET, FILM COATED ORAL at 11:59

## 2022-01-07 NOTE — PROGRESS NOTE ADULT - SUBJECTIVE AND OBJECTIVE BOX
Diagnosis: Relapsed aplastic anemia    Protocol/Chemo Regimen:  Rabbit ATG/Cyclosporine/prednisone/Promacta     Day: D12 CSA / Rabbit ATG day 6 (D2 suspended due to reaction; resumed on 1/3)     Pt endorsed: doing well, no complaints    Review of Systems: denies nausea, vomiting, diarrhea, chest pain, sob    Pain scale: 0                                           Diet: regular    Allergies: No Known Allergies    ANTIMICROBIALS  acyclovir   Oral Tab/Cap 400 milliGRAM(s) Oral every 8 hours  atovaquone  Suspension 1500 milliGRAM(s) Oral daily  cefepime   IVPB 2000 milliGRAM(s) IV Intermittent every 8 hours  fluconAZOLE   Tablet 200 milliGRAM(s) Oral daily      HEME/ONC MEDICATIONS  eltrombopag 150 milliGRAM(s) Oral daily      STANDING MEDICATIONS  Biotene Dry Mouth Oral Rinse 15 milliLiter(s) Swish and Spit four times a day  chlorhexidine 2% Cloths 1 Application(s) Topical <User Schedule>  cycloSPORINE  (SandIMMUNE) 325 milliGRAM(s) Oral every 12 hours  famotidine    Tablet 20 milliGRAM(s) Oral daily  FIRST- Mouthwash  BLM 10 milliLiter(s) Swish and Spit four times a day  lidocaine 2% Viscous 2 milliLiter(s) Oral four times a day  methylPREDNISolone sodium succinate IVPB 500 milliGRAM(s) IV Intermittent once  pantoprazole    Tablet 40 milliGRAM(s) Oral before breakfast  predniSONE   Tablet 100 milliGRAM(s) Oral daily  sodium chloride 0.65% Nasal 1 Spray(s) Both Nostrils four times a day  sodium chloride 0.9%. 1000 milliLiter(s) IV Continuous <Continuous>      PRN MEDICATIONS  acetaminophen     Tablet .. 650 milliGRAM(s) Oral every 6 hours PRN  acetaminophen   IVPB .. 1000 milliGRAM(s) IV Intermittent once PRN  aluminum hydroxide/magnesium hydroxide/simethicone Suspension 30 milliLiter(s) Oral every 4 hours PRN  calcium carbonate    500 mG (Tums) Chewable 1 Tablet(s) Chew every 4 hours PRN  diphenhydrAMINE 25 milliGRAM(s) Oral every 12 hours PRN  diphenhydrAMINE Injectable 25 milliGRAM(s) IV Push once PRN  EPINEPHrine     1 mG/mL Injectable 0.3 milliGRAM(s) IntraMuscular once PRN  hydrocortisone sodium succinate Injectable 50 milliGRAM(s) IV Push every 12 hours PRN  metoclopramide Injectable 10 milliGRAM(s) IV Push every 6 hours PRN  ondansetron Injectable 8 milliGRAM(s) IV Push every 8 hours PRN      Vital Signs Last 24 Hrs  T(C): 36.7 (06 Jan 2022 18:31), Max: 37.2 (06 Jan 2022 15:33)  T(F): 98.1 (06 Jan 2022 18:31), Max: 99 (06 Jan 2022 15:33)  HR: 66 (07 Jan 2022 04:10) (66 - 105)  BP: 120/71 (07 Jan 2022 04:10) (106/62 - 148/84)  RR: 18 (06 Jan 2022 17:03) (18 - 18)  SpO2: 97% (06 Jan 2022 15:33) (97% - 98%)    PHYSICAL EXAM  General: adult in NAD  HEENT: clear oropharynx  CV: normal S1/S2 RRR  Lungs: clear to auscultation, no wheezes, no rales  Abdomen: soft non-tender non-distended  Ext: no edema  Skin: no rash  Neuro: alert and oriented X 4  Central Line: RUE PICC  c/d/i    Cultures:    Culture - Urine (collected 12-30-21 @ 00:57)  Source: Clean Catch Clean Catch (Midstream)  Final Report (12-30-21 @ 22:38):    No growth    Culture - Blood (collected 12-29-21 @ 21:56)  Source: .Blood Blood-Peripheral  Preliminary Report (12-30-21 @ 22:02):    No growth to date.    Culture - Blood (collected 12-29-21 @ 21:56)  Source: .Blood Blood-Catheter  Preliminary Report (12-30-21 @ 22:02):    No growth to date.    Culture - Group A Streptococcus (collected 12-23-21 @ 17:32)  Source: .Throat Throat  Final Report (12-25-21 @ 08:04):    No Streptococcus pyogenes (Group A) isolated    Culture - Group A Streptococcus (collected 12-23-21 @ 14:58)  Source: .Throat Throat  Final Report (12-25-21 @ 08:00):    No Streptococcus pyogenes (Group A) isolated    Culture - Blood (collected 12-23-21 @ 09:16)  Source: .Blood Blood-Venous  Preliminary Report (12-24-21 @ 10:01):    No growth to date.    Culture - Blood (collected 12-23-21 @ 09:16)  Source: .Blood Blood-Peripheral  Preliminary Report (12-24-21 @ 10:01):    No growth to date.    Culture - Urine (collected 12-22-21 @ 01:14)  Source: Clean Catch Clean Catch (Midstream)  Final Report (12-22-21 @ 21:12):    No growth    Culture - Blood (collected 12-21-21 @ 17:15)  Source: .Blood Blood  Final Report (12-26-21 @ 18:00):    No Growth Final    Culture - Blood (collected 12-21-21 @ 17:15)  Source: .Blood Blood  Final Report (12-26-21 @ 18:00):    No Growth Final    Rapid RVP Result: NotDetec (12-21 @ 13:37)    LABS:                                        RADIOLOGY & ADDITIONAL STUDIES:  from: Xray Chest 1 View- PORTABLE-Urgent (Xray Chest 1 View- PORTABLE-Urgent .) (12.30.21 @ 11:22)   FINDINGS:  Clear lungs. No pleural effusion or pneumothorax.  The heart is normal in size. Right-sided central venous catheter   unchanged prior exam.  No acute osseous findings.    IMPRESSION:  Clear lungs.         Diagnosis: Relapsed aplastic anemia    Protocol/Chemo Regimen:  Rabbit ATG/Cyclosporine/prednisone/Promacta     Day: D12 CSA / Rabbit ATG day 6 (D2 suspended due to reaction; resumed on 1/3)     Pt endorsed: doing well, no complaints    Review of Systems: denies nausea, vomiting, diarrhea, chest pain, sob    Pain scale: 0                                           Diet: regular    Allergies: No Known Allergies    ANTIMICROBIALS  acyclovir   Oral Tab/Cap 400 milliGRAM(s) Oral every 8 hours  atovaquone  Suspension 1500 milliGRAM(s) Oral daily  cefepime   IVPB 2000 milliGRAM(s) IV Intermittent every 8 hours  fluconAZOLE   Tablet 200 milliGRAM(s) Oral daily      HEME/ONC MEDICATIONS  eltrombopag 150 milliGRAM(s) Oral daily      STANDING MEDICATIONS  Biotene Dry Mouth Oral Rinse 15 milliLiter(s) Swish and Spit four times a day  chlorhexidine 2% Cloths 1 Application(s) Topical <User Schedule>  cycloSPORINE  (SandIMMUNE) 325 milliGRAM(s) Oral every 12 hours  famotidine    Tablet 20 milliGRAM(s) Oral daily  FIRST- Mouthwash  BLM 10 milliLiter(s) Swish and Spit four times a day  lidocaine 2% Viscous 2 milliLiter(s) Oral four times a day  methylPREDNISolone sodium succinate IVPB 500 milliGRAM(s) IV Intermittent once  pantoprazole    Tablet 40 milliGRAM(s) Oral before breakfast  predniSONE   Tablet 100 milliGRAM(s) Oral daily  sodium chloride 0.65% Nasal 1 Spray(s) Both Nostrils four times a day  sodium chloride 0.9%. 1000 milliLiter(s) IV Continuous <Continuous>      PRN MEDICATIONS  acetaminophen     Tablet .. 650 milliGRAM(s) Oral every 6 hours PRN  acetaminophen   IVPB .. 1000 milliGRAM(s) IV Intermittent once PRN  aluminum hydroxide/magnesium hydroxide/simethicone Suspension 30 milliLiter(s) Oral every 4 hours PRN  calcium carbonate    500 mG (Tums) Chewable 1 Tablet(s) Chew every 4 hours PRN  diphenhydrAMINE 25 milliGRAM(s) Oral every 12 hours PRN  diphenhydrAMINE Injectable 25 milliGRAM(s) IV Push once PRN  EPINEPHrine     1 mG/mL Injectable 0.3 milliGRAM(s) IntraMuscular once PRN  hydrocortisone sodium succinate Injectable 50 milliGRAM(s) IV Push every 12 hours PRN  metoclopramide Injectable 10 milliGRAM(s) IV Push every 6 hours PRN  ondansetron Injectable 8 milliGRAM(s) IV Push every 8 hours PRN      Vital Signs Last 24 Hrs  T(C): 36.7 (06 Jan 2022 18:31), Max: 37.2 (06 Jan 2022 15:33)  T(F): 98.1 (06 Jan 2022 18:31), Max: 99 (06 Jan 2022 15:33)  HR: 66 (07 Jan 2022 04:10) (66 - 105)  BP: 120/71 (07 Jan 2022 04:10) (106/62 - 148/84)  RR: 18 (06 Jan 2022 17:03) (18 - 18)  SpO2: 97% (06 Jan 2022 15:33) (97% - 98%)    PHYSICAL EXAM  General: adult in NAD  HEENT: clear oropharynx  CV: normal S1/S2 RRR  Lungs: clear to auscultation, no wheezes, no rales  Abdomen: soft non-tender non-distended  Ext: no edema  Skin: no rash  Neuro: alert and oriented X 4  Central Line: RUE PICC  c/d/i    Cultures:    Culture - Urine (collected 12-30-21 @ 00:57)  Source: Clean Catch Clean Catch (Midstream)  Final Report (12-30-21 @ 22:38):    No growth    Culture - Blood (collected 12-29-21 @ 21:56)  Source: .Blood Blood-Peripheral  Preliminary Report (12-30-21 @ 22:02):    No growth to date.    Culture - Blood (collected 12-29-21 @ 21:56)  Source: .Blood Blood-Catheter  Preliminary Report (12-30-21 @ 22:02):    No growth to date.    Culture - Group A Streptococcus (collected 12-23-21 @ 17:32)  Source: .Throat Throat  Final Report (12-25-21 @ 08:04):    No Streptococcus pyogenes (Group A) isolated    Culture - Group A Streptococcus (collected 12-23-21 @ 14:58)  Source: .Throat Throat  Final Report (12-25-21 @ 08:00):    No Streptococcus pyogenes (Group A) isolated    Culture - Blood (collected 12-23-21 @ 09:16)  Source: .Blood Blood-Venous  Preliminary Report (12-24-21 @ 10:01):    No growth to date.    Culture - Blood (collected 12-23-21 @ 09:16)  Source: .Blood Blood-Peripheral  Preliminary Report (12-24-21 @ 10:01):    No growth to date.    Culture - Urine (collected 12-22-21 @ 01:14)  Source: Clean Catch Clean Catch (Midstream)  Final Report (12-22-21 @ 21:12):    No growth    Culture - Blood (collected 12-21-21 @ 17:15)  Source: .Blood Blood  Final Report (12-26-21 @ 18:00):    No Growth Final    Culture - Blood (collected 12-21-21 @ 17:15)  Source: .Blood Blood  Final Report (12-26-21 @ 18:00):    No Growth Final    Rapid RVP Result: NotDetec (12-21 @ 13:37)    LABS:                                          6.2    <0.10 )-----------( 19       ( 07 Jan 2022 07:51 )             16.8     07 Jan 2022 07:45    139    |  104    |  23     ----------------------------<  102    4.1     |  22     |  0.76     Ca    9.0        07 Jan 2022 07:45  Phos  3.6       07 Jan 2022 07:45  Mg     1.7       07 Jan 2022 07:45    TPro  6.7    /  Alb  3.9    /  TBili  1.4    /  DBili  x      /  AST  10     /  ALT  50     /  AlkPhos  58     07 Jan 2022 07:45    PT/INR - ( 06 Jan 2022 06:46 )   PT: 13.4 sec;   INR: 1.12 ratio    PTT - ( 06 Jan 2022 06:46 )  PTT:23.9 sec    LIVER FUNCTIONS - ( 07 Jan 2022 07:45 )  Alb: 3.9 g/dL / Pro: 6.7 g/dL / ALK PHOS: 58 U/L / ALT: 50 U/L / AST: 10 U/L / GGT: x           RADIOLOGY & ADDITIONAL STUDIES:  from: Xray Chest 1 View- PORTABLE-Urgent (Xray Chest 1 View- PORTABLE-Urgent .) (12.30.21 @ 11:22)   FINDINGS:  Clear lungs. No pleural effusion or pneumothorax.  The heart is normal in size. Right-sided central venous catheter   unchanged prior exam.  No acute osseous findings.    IMPRESSION:  Clear lungs.

## 2022-01-07 NOTE — PROGRESS NOTE ADULT - ASSESSMENT
Mr. Olivera is a 37 yo M with Aplastic Anemia diagnosed  April 2020, s/p equine ATG, Cyclosporine, and Promacta. Bone marrow biopsy was done on 5/2021 revealed normal morphology and cellularity, eventually Promacta and Cyclosporine was stopped in 5/2021. Then noted his platelets dropping in September 2021.   Now admitted with neutropenic fever found to have pancytopenia secondary to disease condition.  Bone marrow biopsy was repeated on 12/2021 showed decreased megakaryocytes c/w relapsed disease. Rabbit ATG/ prednisone  started on 12/29/21; discontinued on 12/30 due to reaction and resumed on 1/3/21. Hospital course complicated by ATG reaction, and neutropenic fever. Pt has pancytopenia secondary to chemotherapy and or disease   Mr. Olivera is a 37 yo M with Aplastic Anemia diagnosed  April 2020, s/p equine ATG, Cyclosporine, and Promacta. Bone marrow biopsy was done on 5/2021 revealed normal morphology and cellularity, eventually Promacta and Cyclosporine was stopped in 5/2021. Then noted his platelets dropping in September 2021.   Now admitted with neutropenic fever found to have pancytopenia secondary to disease condition.  Bone marrow biopsy was repeated on 12/2021 showed decreased megakaryocytes c/w relapsed disease. Rabbit ATG/ prednisone  started on 12/29/21; ATG held starting on 12/30 due to reaction and resumed on 1/3/22. Hospital course complicated by ATG reaction, and neutropenic fever. Pt has pancytopenia secondary to chemotherapy and or disease

## 2022-01-07 NOTE — PROGRESS NOTE ADULT - SUBJECTIVE AND OBJECTIVE BOX
Diagnosis: Relapsed aplastic anemia    Protocol/Chemo Regimen: pending rabbit ATG  Day: TBA     Pt endorsed:    Review of Systems: Patient denied nausea, vomiting, odynophagia, chest pain, cough, dyspnea, abdominal pain, constipation, diarrhea, rash, fatigue, headache      Pain scale:                                        Location:    Diet: regular    Allergies:     No Known Allergies      ANTIMICROBIALS  acyclovir   Oral Tab/Cap 400 milliGRAM(s) Oral every 8 hours  cefepime   IVPB 2000 milliGRAM(s) IV Intermittent every 8 hours  fluconAZOLE   Tablet 200 milliGRAM(s) Oral daily      STANDING MEDICATIONS  famotidine    Tablet 20 milliGRAM(s) Oral two times a day      Vital Signs Last 24 Hrs  T(C): 36.9 (23 Dec 2021 12:36), Max: 38.9 (23 Dec 2021 04:49)  T(F): 98.4 (23 Dec 2021 12:36), Max: 102 (23 Dec 2021 04:49)  HR: 111 (23 Dec 2021 12:36) (85 - 114)  BP: 137/70 (23 Dec 2021 12:36) (103/64 - 137/70)  BP(mean): --  RR: 18 (23 Dec 2021 12:36) (18 - 18)  SpO2: 100% (23 Dec 2021 12:36) (94% - 100%)      PHYSICAL EXAM  General: adult in NAD  HEENT: clear oropharynx, anicteric sclera  Neck: supple  CV: normal S1/S2 RRR  Lungs: positive air movement b/l ant lungs,clear to auscultation, no wheezes, no rales  Abdomen: soft, + BS,  non-tender non-distended, no hepatosplenomegaly  Ext: no edema  Skin: no rash  Neuro: alert and oriented X 3, no focal deficits  Central Line: normal    LABS:                           9.1    2.03  )-----------( 55       ( 23 Dec 2021 05:47 )             25.5         Mean Cell Volume : 83.3 fl  Mean Cell Hemoglobin : 29.7 pg  Mean Cell Hemoglobin Concentration : 35.7 gm/dL  Auto Neutrophil # : x  Auto Lymphocyte # : x  Auto Monocyte # : x  Auto Eosinophil # : x  Auto Basophil # : x  Auto Neutrophil % : x  Auto Lymphocyte % : x  Auto Monocyte % : x  Auto Eosinophil % : x  Auto Basophil % : x      12-23    137  |  100  |  20  ----------------------------<  108<H>  4.3   |  22  |  1.04    Ca    9.7      23 Dec 2021 11:43  Phos  3.3     12-23  Mg     1.9     12-23    TPro  7.5  /  Alb  4.5  /  TBili  0.8  /  DBili  x   /  AST  13  /  ALT  26  /  AlkPhos  84  12-23      Mg 1.9  Phos 3.3  Mg 1.8  Phos 3.6  Mg 2.0  Phos 3.9      PT/INR - ( 21 Dec 2021 13:39 )   PT: 14.1 sec;   INR: 1.18 ratio         PTT - ( 21 Dec 2021 13:39 )  PTT:34.3 sec        RADIOLOGY & ADDITIONAL STUDIES:    < from: Xray Chest 1 View- PORTABLE-Urgent (Xray Chest 1 View- PORTABLE-Urgent .) (12.21.21 @ 14:08) >  IMPRESSION:  No active pulmonary disease.     Diagnosis: Relapsed aplastic anemia    Protocol/Chemo Regimen: pending rabbit ATG  Day: TBA     Pt endorsed: right lower gum discomfort, mild increased today    Review of Systems: Patient denied nausea, vomiting, odynophagia, chest pain, cough, dyspnea, abdominal pain, constipation, diarrhea, rash, fatigue, headache      Pain scale:     3-4/10                                    Location: right lower gum     Diet: regular    Allergies:     No Known Allergies      ANTIMICROBIALS  acyclovir   Oral Tab/Cap 400 milliGRAM(s) Oral every 8 hours  cefepime   IVPB 2000 milliGRAM(s) IV Intermittent every 8 hours  fluconAZOLE   Tablet 200 milliGRAM(s) Oral daily      STANDING MEDICATIONS  famotidine    Tablet 20 milliGRAM(s) Oral two times a day      Vital Signs Last 24 Hrs  T(C): 36.9 (23 Dec 2021 12:36), Max: 38.9 (23 Dec 2021 04:49)  T(F): 98.4 (23 Dec 2021 12:36), Max: 102 (23 Dec 2021 04:49)  HR: 111 (23 Dec 2021 12:36) (85 - 114)  BP: 137/70 (23 Dec 2021 12:36) (103/64 - 137/70)  BP(mean): --  RR: 18 (23 Dec 2021 12:36) (18 - 18)  SpO2: 100% (23 Dec 2021 12:36) (94% - 100%)      PHYSICAL EXAM  General: adult in NAD  HEENT: clear oropharynx, anicteric sclera  CV: normal S1/S2 RRR  Lungs: positive air movement b/l ant lungs,clear to auscultation, no wheezes, no rales  Abdomen: soft, + BS,  non-tender non-distended  Ext: no edema  Skin: no rash  Neuro: alert and oriented X 3  Central Line: PIV CDI      LABS:                        9.1    2.03  )-----------( 55       ( 23 Dec 2021 05:47 )             25.5         Mean Cell Volume : 83.3 fl  Mean Cell Hemoglobin : 29.7 pg  Mean Cell Hemoglobin Concentration : 35.7 gm/dL  Auto Neutrophil # : x  Auto Lymphocyte # : x  Auto Monocyte # : x  Auto Eosinophil # : x  Auto Basophil # : x  Auto Neutrophil % : x  Auto Lymphocyte % : x  Auto Monocyte % : x  Auto Eosinophil % : x  Auto Basophil % : x      12-23    137  |  100  |  20  ----------------------------<  108<H>  4.3   |  22  |  1.04    Ca    9.7      23 Dec 2021 11:43  Phos  3.3     12-23  Mg     1.9     12-23    TPro  7.5  /  Alb  4.5  /  TBili  0.8  /  DBili  x   /  AST  13  /  ALT  26  /  AlkPhos  84  12-23    Cultures:     Culture - Urine (12.22.21 @ 01:14)    Specimen Source: Clean Catch Clean Catch (Midstream)    Culture Results:   No growth    Culture - Blood (12.21.21 @ 17:15)    Specimen Source: .Blood Blood    Culture Results:   No growth to date.    Culture - Blood (12.21.21 @ 17:15)    Specimen Source: .Blood Blood    Culture Results:   No growth to date.        RADIOLOGY & ADDITIONAL STUDIES:    < from: Xray Chest 1 View- PORTABLE-Urgent (Xray Chest 1 View- PORTABLE-Urgent .) (12.21.21 @ 14:08) >  IMPRESSION:  No active pulmonary disease.     no

## 2022-01-07 NOTE — PROGRESS NOTE ADULT - PROBLEM SELECTOR PLAN 1
Relapsed Aplastic Anemia   s/p hATG, CSA, and promacta with complete remission in 2020, BM bx on 11/9/20 revealed hypocellular marrow.  BMbx 12/2021 showing erythroid predominance, myeloid and erythroid maturation, and markedly decreased megakaryocytes c/w relapse  12/23-   transferred  to [facility] for rATG treatment  Monitor CBC with diff/lytes, transfuse and or replete PRN, Monitor weight, I and O, mouth care  12/26- Started Cyclosporine 300 mg PO Q 12 hrs , 12/27 test dose - no reaction noted  12/28 PICC placed  12/29 Started  Rabbit ATG/prednisone-Reaction to rATG- HELD on 12/30 due to reaction ; Continue promacta  1/3 Resumed  rabbit ATG/prednisone  1/5 discharge planning  1/6 completing rATG today. PLTs x 1 abg. counts at clint. Relapsed Aplastic Anemia   s/p hATG, CSA, and promacta with complete remission in 2020, BM bx on 11/9/20 revealed hypocellular marrow.  BMbx 12/2021 showing erythroid predominance, myeloid and erythroid maturation, and markedly decreased megakaryocytes c/w relapse  12/23-   transferred  to [facility] for rATG treatment  Monitor CBC with diff/lytes, transfuse and or replete PRN, Monitor weight, I and O, mouth care  12/26- Started Cyclosporine 300 mg PO Q 12 hrs , 12/27 test dose - no reaction noted  12/28 PICC placed  12/29 Started  Rabbit ATG/prednisone-Reaction to rATG- HELD on 12/30 due to reaction ; Continue promacta  1/3 Resumed  rabbit ATG/prednisone  1/6 completed rATG. PLTs x 1 abg. counts at clint.  1/7 1u pRBC, CSA level 133, increased Cyclosporine dose to 350mg BID. re-check levels qTues/Fri

## 2022-01-07 NOTE — PROGRESS NOTE ADULT - ATTENDING COMMENTS
36M with Aplastic Anemia diagnosed April 2020.  Underwent treatment with equine ATG, Cyclosporine, and Promacta.  Went into remission (BM bx 5/2021 with normal morphology and cellularity).  Promacta stopped May 2021.  Cyclosporin stopped August 2021.  By September, platelets started to drop.  Now with pancytopenia and neutropenic fever.  No obvious focus of infection. On cefepime, diflucan, acyclovir and mepron.  On cyclosporine 300 mg BID (started on 12/26/21), first level on 12/29/21 --197 increased dose to 325mg bid, repeat level 230.   Today is cyclosporine day 10  Completed rATG and continues with prednisone and Promacta.  Doing well, no complaints. 36M with Aplastic Anemia diagnosed April 2020.  Underwent treatment with equine ATG, Cyclosporine, and Promacta.  Went into remission (BM bx 5/2021 with normal morphology and cellularity).  Promacta stopped May 2021.  Cyclosporin stopped August 2021.  By September, platelets started to drop.  Now with pancytopenia and neutropenic fever.  No obvious focus of infection. On cefepime, diflucan, acyclovir and mepron.  On cyclosporine 300 mg BID (started on 12/26/21), first level on 12/29/21 --197 increased dose to 325mg bid, repeat level  133, increasing CsA to 350 q12hr.   Today is cyclosporine day 12, day 6 rATG  Completed rATG and continues with prednisone and Promacta.  Doing well, no complaints.  follow CsA trough, Mg, LFTs

## 2022-01-08 DIAGNOSIS — J34.89 OTHER SPECIFIED DISORDERS OF NOSE AND NASAL SINUSES: ICD-10-CM

## 2022-01-08 DIAGNOSIS — R79.1 ABNORMAL COAGULATION PROFILE: ICD-10-CM

## 2022-01-08 LAB
ALBUMIN SERPL ELPH-MCNC: 3.9 G/DL — SIGNIFICANT CHANGE UP (ref 3.3–5)
ALP SERPL-CCNC: 59 U/L — SIGNIFICANT CHANGE UP (ref 40–120)
ALT FLD-CCNC: 42 U/L — SIGNIFICANT CHANGE UP (ref 10–45)
ANION GAP SERPL CALC-SCNC: 13 MMOL/L — SIGNIFICANT CHANGE UP (ref 5–17)
APTT BLD: 24.8 SEC — LOW (ref 27.5–35.5)
AST SERPL-CCNC: 14 U/L — SIGNIFICANT CHANGE UP (ref 10–40)
BILIRUB SERPL-MCNC: 1.5 MG/DL — HIGH (ref 0.2–1.2)
BUN SERPL-MCNC: 21 MG/DL — SIGNIFICANT CHANGE UP (ref 7–23)
CALCIUM SERPL-MCNC: 9 MG/DL — SIGNIFICANT CHANGE UP (ref 8.4–10.5)
CHLORIDE SERPL-SCNC: 102 MMOL/L — SIGNIFICANT CHANGE UP (ref 96–108)
CO2 SERPL-SCNC: 22 MMOL/L — SIGNIFICANT CHANGE UP (ref 22–31)
CREAT SERPL-MCNC: 0.74 MG/DL — SIGNIFICANT CHANGE UP (ref 0.5–1.3)
FIBRINOGEN PPP-MCNC: 708 MG/DL — HIGH (ref 290–520)
GLUCOSE SERPL-MCNC: 103 MG/DL — HIGH (ref 70–99)
HCT VFR BLD CALC: 18.8 % — CRITICAL LOW (ref 39–50)
HGB BLD-MCNC: 6.9 G/DL — CRITICAL LOW (ref 13–17)
INR BLD: 1.21 RATIO — HIGH (ref 0.88–1.16)
LDH SERPL L TO P-CCNC: 144 U/L — SIGNIFICANT CHANGE UP (ref 50–242)
MAGNESIUM SERPL-MCNC: 1.7 MG/DL — SIGNIFICANT CHANGE UP (ref 1.6–2.6)
MCHC RBC-ENTMCNC: 30.4 PG — SIGNIFICANT CHANGE UP (ref 27–34)
MCHC RBC-ENTMCNC: 36.7 GM/DL — HIGH (ref 32–36)
MCV RBC AUTO: 82.8 FL — SIGNIFICANT CHANGE UP (ref 80–100)
NRBC # BLD: 7 /100 WBCS — HIGH (ref 0–0)
PHOSPHATE SERPL-MCNC: 3 MG/DL — SIGNIFICANT CHANGE UP (ref 2.5–4.5)
PLATELET # BLD AUTO: 7 K/UL — CRITICAL LOW (ref 150–400)
POTASSIUM SERPL-MCNC: 3.6 MMOL/L — SIGNIFICANT CHANGE UP (ref 3.5–5.3)
POTASSIUM SERPL-SCNC: 3.6 MMOL/L — SIGNIFICANT CHANGE UP (ref 3.5–5.3)
PROT SERPL-MCNC: 6.8 G/DL — SIGNIFICANT CHANGE UP (ref 6–8.3)
PROTHROM AB SERPL-ACNC: 14.4 SEC — HIGH (ref 10.6–13.6)
RBC # BLD: 2.27 M/UL — LOW (ref 4.2–5.8)
RBC # FLD: 11.9 % — SIGNIFICANT CHANGE UP (ref 10.3–14.5)
SODIUM SERPL-SCNC: 137 MMOL/L — SIGNIFICANT CHANGE UP (ref 135–145)
URATE SERPL-MCNC: 2.4 MG/DL — LOW (ref 3.4–8.8)
WBC # BLD: 0.14 K/UL — CRITICAL LOW (ref 3.8–10.5)
WBC # FLD AUTO: 0.14 K/UL — CRITICAL LOW (ref 3.8–10.5)

## 2022-01-08 PROCEDURE — 99232 SBSQ HOSP IP/OBS MODERATE 35: CPT | Mod: GC

## 2022-01-08 RX ORDER — PHYTONADIONE (VIT K1) 5 MG
5 TABLET ORAL DAILY
Refills: 0 | Status: COMPLETED | OUTPATIENT
Start: 2022-01-08 | End: 2022-01-10

## 2022-01-08 RX ORDER — LORATADINE 10 MG/1
10 TABLET ORAL DAILY
Refills: 0 | Status: DISCONTINUED | OUTPATIENT
Start: 2022-01-08 | End: 2022-01-20

## 2022-01-08 RX ORDER — FLUTICASONE PROPIONATE 50 MCG
1 SPRAY, SUSPENSION NASAL
Refills: 0 | Status: DISCONTINUED | OUTPATIENT
Start: 2022-01-08 | End: 2022-01-20

## 2022-01-08 RX ORDER — ACETAMINOPHEN 500 MG
1000 TABLET ORAL ONCE
Refills: 0 | Status: COMPLETED | OUTPATIENT
Start: 2022-01-08 | End: 2022-01-08

## 2022-01-08 RX ADMIN — Medication 25 MILLIGRAM(S): at 09:12

## 2022-01-08 RX ADMIN — Medication 1 SPRAY(S): at 17:27

## 2022-01-08 RX ADMIN — CEFEPIME 100 MILLIGRAM(S): 1 INJECTION, POWDER, FOR SOLUTION INTRAMUSCULAR; INTRAVENOUS at 05:07

## 2022-01-08 RX ADMIN — ATOVAQUONE 1500 MILLIGRAM(S): 750 SUSPENSION ORAL at 12:00

## 2022-01-08 RX ADMIN — SODIUM CHLORIDE 50 MILLILITER(S): 9 INJECTION INTRAMUSCULAR; INTRAVENOUS; SUBCUTANEOUS at 21:57

## 2022-01-08 RX ADMIN — Medication 400 MILLIGRAM(S): at 13:34

## 2022-01-08 RX ADMIN — Medication 50 MILLIGRAM(S): at 09:12

## 2022-01-08 RX ADMIN — Medication 1 SPRAY(S): at 17:24

## 2022-01-08 RX ADMIN — Medication 15 MILLILITER(S): at 17:24

## 2022-01-08 RX ADMIN — Medication 400 MILLIGRAM(S): at 05:07

## 2022-01-08 RX ADMIN — CYCLOSPORINE 350 MILLIGRAM(S): 100 CAPSULE ORAL at 17:24

## 2022-01-08 RX ADMIN — LORATADINE 10 MILLIGRAM(S): 10 TABLET ORAL at 12:00

## 2022-01-08 RX ADMIN — FAMOTIDINE 20 MILLIGRAM(S): 10 INJECTION INTRAVENOUS at 12:00

## 2022-01-08 RX ADMIN — Medication 400 MILLIGRAM(S): at 09:11

## 2022-01-08 RX ADMIN — ELTROMBOPAG OLAMINE 150 MILLIGRAM(S): 50 TABLET, FILM COATED ORAL at 13:34

## 2022-01-08 RX ADMIN — Medication 400 MILLIGRAM(S): at 21:55

## 2022-01-08 RX ADMIN — Medication 100 MILLIGRAM(S): at 12:00

## 2022-01-08 RX ADMIN — CHLORHEXIDINE GLUCONATE 1 APPLICATION(S): 213 SOLUTION TOPICAL at 13:17

## 2022-01-08 RX ADMIN — Medication 15 MILLILITER(S): at 12:05

## 2022-01-08 RX ADMIN — PANTOPRAZOLE SODIUM 40 MILLIGRAM(S): 20 TABLET, DELAYED RELEASE ORAL at 08:23

## 2022-01-08 RX ADMIN — CEFEPIME 100 MILLIGRAM(S): 1 INJECTION, POWDER, FOR SOLUTION INTRAMUSCULAR; INTRAVENOUS at 16:06

## 2022-01-08 RX ADMIN — Medication 1 SPRAY(S): at 12:01

## 2022-01-08 RX ADMIN — FLUCONAZOLE 200 MILLIGRAM(S): 150 TABLET ORAL at 11:59

## 2022-01-08 RX ADMIN — Medication 5 MILLIGRAM(S): at 12:01

## 2022-01-08 RX ADMIN — CYCLOSPORINE 350 MILLIGRAM(S): 100 CAPSULE ORAL at 05:08

## 2022-01-08 RX ADMIN — Medication 1000 MILLIGRAM(S): at 09:35

## 2022-01-08 RX ADMIN — CEFEPIME 100 MILLIGRAM(S): 1 INJECTION, POWDER, FOR SOLUTION INTRAMUSCULAR; INTRAVENOUS at 21:56

## 2022-01-08 NOTE — PROGRESS NOTE ADULT - SUBJECTIVE AND OBJECTIVE BOX
Diagnosis:    Protocol/Chemo Regimen:    Day:     Pt endorsed:    Review of Systems:     Pain scale:     Diet:     Allergies    No Known Allergies    Intolerances        ANTIMICROBIALS  acyclovir   Oral Tab/Cap 400 milliGRAM(s) Oral every 8 hours  atovaquone  Suspension 1500 milliGRAM(s) Oral daily  cefepime   IVPB 2000 milliGRAM(s) IV Intermittent every 8 hours  fluconAZOLE   Tablet 200 milliGRAM(s) Oral daily      HEME/ONC MEDICATIONS  eltrombopag 150 milliGRAM(s) Oral daily      STANDING MEDICATIONS  Biotene Dry Mouth Oral Rinse 15 milliLiter(s) Swish and Spit four times a day  chlorhexidine 2% Cloths 1 Application(s) Topical <User Schedule>  cycloSPORINE  (SandIMMUNE) 350 milliGRAM(s) Oral every 12 hours  famotidine    Tablet 20 milliGRAM(s) Oral daily  FIRST- Mouthwash  BLM 10 milliLiter(s) Swish and Spit four times a day  lidocaine 2% Viscous 2 milliLiter(s) Oral four times a day  methylPREDNISolone sodium succinate IVPB 500 milliGRAM(s) IV Intermittent once  pantoprazole    Tablet 40 milliGRAM(s) Oral before breakfast  predniSONE   Tablet 100 milliGRAM(s) Oral daily  sodium chloride 0.65% Nasal 1 Spray(s) Both Nostrils four times a day  sodium chloride 0.9%. 1000 milliLiter(s) IV Continuous <Continuous>      PRN MEDICATIONS  acetaminophen     Tablet .. 650 milliGRAM(s) Oral every 6 hours PRN  acetaminophen   IVPB .. 1000 milliGRAM(s) IV Intermittent once PRN  aluminum hydroxide/magnesium hydroxide/simethicone Suspension 30 milliLiter(s) Oral every 4 hours PRN  calcium carbonate    500 mG (Tums) Chewable 1 Tablet(s) Chew every 4 hours PRN  diphenhydrAMINE 25 milliGRAM(s) Oral every 12 hours PRN  diphenhydrAMINE Injectable 25 milliGRAM(s) IV Push once PRN  EPINEPHrine     1 mG/mL Injectable 0.3 milliGRAM(s) IntraMuscular once PRN  hydrocortisone sodium succinate Injectable 50 milliGRAM(s) IV Push every 12 hours PRN  metoclopramide Injectable 10 milliGRAM(s) IV Push every 6 hours PRN  ondansetron Injectable 8 milliGRAM(s) IV Push every 8 hours PRN        Vital Signs Last 24 Hrs  T(C): 37.1 (08 Jan 2022 05:44), Max: 37.5 (07 Jan 2022 13:05)  T(F): 98.7 (08 Jan 2022 05:44), Max: 99.5 (07 Jan 2022 13:05)  HR: 86 (08 Jan 2022 05:44) (86 - 98)  BP: 131/71 (08 Jan 2022 05:44) (114/74 - 148/83)  BP(mean): --  RR: 18 (08 Jan 2022 05:44) (18 - 18)  SpO2: 98% (08 Jan 2022 05:44) (96% - 99%)    PHYSICAL EXAM  General: NAD  HEENT: PERRLA, EOMOI, clear oropharynx, anicteric sclera, pink conjunctiva  Neck: supple  CV: (+) S1/S2 RRR  Lungs: clear to auscultation, no wheezes or rales  Abdomen: soft, non-tender, non-distended (+) BS  Ext: no clubbing, cyanosis or edema  Skin: no rashes and no petechiae  Neuro: alert and oriented X 3, no focal deficits  Central Line:     RECENT CULTURES:        LABS:                        6.2    <0.10 )-----------( 19       ( 07 Jan 2022 07:51 )             16.8         Mean Cell Volume : 83.6 fl  Mean Cell Hemoglobin : 30.8 pg  Mean Cell Hemoglobin Concentration : 36.9 gm/dL  Auto Neutrophil # : x  Auto Lymphocyte # : x  Auto Monocyte # : x  Auto Eosinophil # : x  Auto Basophil # : x  Auto Neutrophil % : x  Auto Lymphocyte % : x  Auto Monocyte % : x  Auto Eosinophil % : x  Auto Basophil % : x      01-07    139  |  104  |  23  ----------------------------<  102<H>  4.1   |  22  |  0.76    Ca    9.0      07 Jan 2022 07:45  Phos  3.6     01-07  Mg     1.7     01-07    TPro  6.7  /  Alb  3.9  /  TBili  1.4<H>  /  DBili  x   /  AST  10  /  ALT  50<H>  /  AlkPhos  58  01-07      Mg 1.7  Phos 3.6            Uric Acid 2.3        RADIOLOGY & ADDITIONAL STUDIES:         Diagnosis: Relapsed aplastic anemia    Protocol/Chemo Regimen:  Rabbit ATG/Cyclosporine/prednisone/Promacta     Day: D13 CSA / Rabbit ATG day 7 (D2 suspended due to reaction; resumed on 1/3)     Pt endorsed: +headache    Review of Systems: denies nausea, vomiting, diarrhea, chest pain, sob    Pain scale: 0                                           Diet: regular    Allergies: No Known Allergies    ANTIMICROBIALS  acyclovir   Oral Tab/Cap 400 milliGRAM(s) Oral every 8 hours  atovaquone  Suspension 1500 milliGRAM(s) Oral daily  cefepime   IVPB 2000 milliGRAM(s) IV Intermittent every 8 hours  fluconAZOLE   Tablet 200 milliGRAM(s) Oral daily    HEME/ONC MEDICATIONS  eltrombopag 150 milliGRAM(s) Oral daily    STANDING MEDICATIONS  Biotene Dry Mouth Oral Rinse 15 milliLiter(s) Swish and Spit four times a day  chlorhexidine 2% Cloths 1 Application(s) Topical <User Schedule>  cycloSPORINE  (SandIMMUNE) 350 milliGRAM(s) Oral every 12 hours  famotidine    Tablet 20 milliGRAM(s) Oral daily  FIRST- Mouthwash  BLM 10 milliLiter(s) Swish and Spit four times a day  lidocaine 2% Viscous 2 milliLiter(s) Oral four times a day  methylPREDNISolone sodium succinate IVPB 500 milliGRAM(s) IV Intermittent once  pantoprazole    Tablet 40 milliGRAM(s) Oral before breakfast  predniSONE   Tablet 100 milliGRAM(s) Oral daily  sodium chloride 0.65% Nasal 1 Spray(s) Both Nostrils four times a day  sodium chloride 0.9%. 1000 milliLiter(s) IV Continuous <Continuous>    PRN MEDICATIONS  acetaminophen     Tablet .. 650 milliGRAM(s) Oral every 6 hours PRN  acetaminophen   IVPB .. 1000 milliGRAM(s) IV Intermittent once PRN  aluminum hydroxide/magnesium hydroxide/simethicone Suspension 30 milliLiter(s) Oral every 4 hours PRN  calcium carbonate    500 mG (Tums) Chewable 1 Tablet(s) Chew every 4 hours PRN  diphenhydrAMINE 25 milliGRAM(s) Oral every 12 hours PRN  diphenhydrAMINE Injectable 25 milliGRAM(s) IV Push once PRN  EPINEPHrine     1 mG/mL Injectable 0.3 milliGRAM(s) IntraMuscular once PRN  hydrocortisone sodium succinate Injectable 50 milliGRAM(s) IV Push every 12 hours PRN  metoclopramide Injectable 10 milliGRAM(s) IV Push every 6 hours PRN  ondansetron Injectable 8 milliGRAM(s) IV Push every 8 hours PRN    Vital Signs Last 24 Hrs  T(C): 37.1 (08 Jan 2022 05:44), Max: 37.5 (07 Jan 2022 13:05)  T(F): 98.7 (08 Jan 2022 05:44), Max: 99.5 (07 Jan 2022 13:05)  HR: 86 (08 Jan 2022 05:44) (86 - 98)  BP: 131/71 (08 Jan 2022 05:44) (114/74 - 148/83)  BP(mean): --  RR: 18 (08 Jan 2022 05:44) (18 - 18)  SpO2: 98% (08 Jan 2022 05:44) (96% - 99%)    PHYSICAL EXAM  General: adult in NAD  HEENT: clear oropharynx, +tenderness at the ethmoidal sinus  CV: normal S1/S2 RRR  Lungs: clear to auscultation, no wheezes, no rales  Abdomen: soft non-tender non-distended  Ext: no edema  Skin: no rash  Neuro: alert and oriented X 4  Central Line: RUE PICC  c/d/i    RECENT CULTURES:  Culture - Blood (12.29.21 @ 21:56)    Specimen Source: .Blood Blood-Peripheral    Culture Results:   No Growth Final    LABS:                       6.9    0.14  )-----------( 7        ( 08 Jan 2022 07:22 )             18.8     Mean Cell Volume : 82.8 fl  Mean Cell Hemoglobin : 30.4 pg  Mean Cell Hemoglobin Concentration : 36.7 gm/dL  Auto Neutrophil # : x  Auto Lymphocyte # : x  Auto Monocyte # : x  Auto Eosinophil # : x  Auto Basophil # : x  Auto Neutrophil % : x  Auto Lymphocyte % : x  Auto Monocyte % : x  Auto Eosinophil % : x  Auto Basophil % : x    01-08    137  |  102  |  21  ----------------------------<  103<H>  3.6   |  22  |  0.74    Ca    9.0      08 Jan 2022 07:25  Phos  3.0     01-08  Mg     1.7     01-08    TPro  6.8  /  Alb  3.9  /  TBili  1.5<H>  /  DBili  x   /  AST  14  /  ALT  42  /  AlkPhos  59  01-08  Mg 1.7  Phos 3.0  PT/INR - ( 08 Jan 2022 10:06 )   PT: 14.4 sec;   INR: 1.21 ratio    PTT - ( 08 Jan 2022 10:06 )  PTT:24.8 sec      Uric Acid 2.4    RADIOLOGY & ADDITIONAL STUDIES:  Xray Chest 1 View- PORTABLE-Urgent (Xray Chest 1 View- PORTABLE-Urgent .) (12.30.21 @ 11:22) >  Clear lungs.

## 2022-01-08 NOTE — PROGRESS NOTE ADULT - PROBLEM SELECTOR PROBLEM 6
"PT prolonged

## 2022-01-08 NOTE — PROGRESS NOTE ADULT - ATTENDING COMMENTS
36M with Aplastic Anemia diagnosed April 2020.  Underwent treatment with equine ATG, Cyclosporine, and Promacta.  Went into remission (BM bx 5/2021 with normal morphology and cellularity).  Promacta stopped May 2021.  Cyclosporin stopped August 2021.  By September, platelets started to drop.  Now with pancytopenia and neutropenic fever.  No obvious focus of infection. On cefepime, diflucan, acyclovir and mepron.  On cyclosporine 300 mg BID (started on 12/26/21), first level on 12/29/21 --197 increased dose to 325mg bid, repeat level  133, increasing CsA to 350 q12hr.   Today is cyclosporine day 12, day 6 rATG  Completed rATG and continues with prednisone and Promacta.  Doing well, no complaints.  follow CsA trough, Mg, LFTs 36M with Aplastic Anemia diagnosed April 2020.  Underwent treatment with equine ATG, Cyclosporine, and Promacta.  Went into remission (BM bx 5/2021 with normal morphology and cellularity).  Promacta stopped May 2021.  Cyclosporin stopped August 2021.  By September, platelets started to drop.  Now with pancytopenia and neutropenic fever.  No obvious focus of infection. On cefepime, diflucan, acyclovir and mepron.  On cyclosporine 300 mg BID (started on 12/26/21), first level on 12/29/21 --197 increased dose to 325mg bid, repeat level  133, increasing CsA to 350 q12hr.   Today is cyclosporine day 13, day 7 rATG  Completed rATG and continues with prednisone and Promacta.  Doing well, new sinus pressure congestion today, will check CT sinuses   continue tylenol PRN  follow CsA trough, Mg, LFTs

## 2022-01-08 NOTE — PROGRESS NOTE ADULT - PROBLEM SELECTOR PLAN 1
Relapsed Aplastic Anemia   s/p hATG, CSA, and promacta with complete remission in 2020, BM bx on 11/9/20 revealed hypocellular marrow.  BMbx 12/2021 showing erythroid predominance, myeloid and erythroid maturation, and markedly decreased megakaryocytes c/w relapse  12/23-   transferred  to [facility] for rATG treatment  Monitor CBC with diff/lytes, transfuse and or replete PRN, Monitor weight, I and O, mouth care  12/26- Started Cyclosporine 300 mg PO Q 12 hrs , 12/27 test dose - no reaction noted  12/28 PICC placed  12/29 Started  Rabbit ATG/prednisone-Reaction to rATG- HELD on 12/30 due to reaction ; Continue promacta  1/3 Resumed  rabbit ATG/prednisone  1/6 completed rATG. PLTs x 1 abg. counts at clint.  1/7 1u pRBC, CSA level 133, increased Cyclosporine dose to 350mg BID. re-check levels qTues/Fri Relapsed Aplastic Anemia   s/p hATG, CSA, and promacta with complete remission in 2020, BM bx on 11/9/20 revealed hypocellular marrow.  BMbx 12/2021 showing erythroid predominance, myeloid and erythroid maturation, and markedly decreased megakaryocytes c/w relapse  12/23-   transferred  to [facility] for rATG treatment  Monitor CBC with diff/lytes, transfuse and or replete PRN, Monitor weight, I and O, mouth care  12/26- Started Cyclosporine 300 mg PO Q 12 hrs , 12/27 test dose - no reaction noted  12/28 PICC placed  12/29 Started  Rabbit ATG/prednisone-Reaction to rATG- HELD on 12/30 due to reaction ; Continue promacta  1/3 Resumed  rabbit ATG/prednisone  1/6 completed rATG. PLTs x 1 abg. counts at clint.  1/7 1u pRBC, CSA level 133, increased Cyclosporine dose to 350mg BID. re-check levels qTues/Fri  1/8- F/u Cyclosporine level on 1/9 before am dose.

## 2022-01-08 NOTE — PROGRESS NOTE ADULT - ASSESSMENT
Mr. Olivera is a 37 yo M with Aplastic Anemia diagnosed  April 2020, s/p equine ATG, Cyclosporine, and Promacta. Bone marrow biopsy was done on 5/2021 revealed normal morphology and cellularity, eventually Promacta and Cyclosporine was stopped in 5/2021. Then noted his platelets dropping in September 2021.   Now admitted with neutropenic fever found to have pancytopenia secondary to disease condition.  Bone marrow biopsy was repeated on 12/2021 showed decreased megakaryocytes c/w relapsed disease. Rabbit ATG/ prednisone  started on 12/29/21; ATG held starting on 12/30 due to reaction and resumed on 1/3/22. Hospital course complicated by ATG reaction, and neutropenic fever. Pt has pancytopenia secondary to chemotherapy and or disease

## 2022-01-09 LAB
ALBUMIN SERPL ELPH-MCNC: 3.8 G/DL — SIGNIFICANT CHANGE UP (ref 3.3–5)
ALP SERPL-CCNC: 62 U/L — SIGNIFICANT CHANGE UP (ref 40–120)
ALT FLD-CCNC: 36 U/L — SIGNIFICANT CHANGE UP (ref 10–45)
ANION GAP SERPL CALC-SCNC: 13 MMOL/L — SIGNIFICANT CHANGE UP (ref 5–17)
APTT BLD: 25 SEC — LOW (ref 27.5–35.5)
AST SERPL-CCNC: 8 U/L — LOW (ref 10–40)
BILIRUB SERPL-MCNC: 1.3 MG/DL — HIGH (ref 0.2–1.2)
BUN SERPL-MCNC: 22 MG/DL — SIGNIFICANT CHANGE UP (ref 7–23)
CALCIUM SERPL-MCNC: 9.1 MG/DL — SIGNIFICANT CHANGE UP (ref 8.4–10.5)
CHLORIDE SERPL-SCNC: 102 MMOL/L — SIGNIFICANT CHANGE UP (ref 96–108)
CO2 SERPL-SCNC: 21 MMOL/L — LOW (ref 22–31)
CREAT SERPL-MCNC: 0.8 MG/DL — SIGNIFICANT CHANGE UP (ref 0.5–1.3)
CYCLOSPORINE SER-MCNC: 87 NG/ML — LOW (ref 150–400)
FIBRINOGEN PPP-MCNC: 878 MG/DL — HIGH (ref 290–520)
GLUCOSE SERPL-MCNC: 98 MG/DL — SIGNIFICANT CHANGE UP (ref 70–99)
HCT VFR BLD CALC: 20.4 % — CRITICAL LOW (ref 39–50)
HGB BLD-MCNC: 7.5 G/DL — LOW (ref 13–17)
INR BLD: 1.13 RATIO — SIGNIFICANT CHANGE UP (ref 0.88–1.16)
LDH SERPL L TO P-CCNC: 143 U/L — SIGNIFICANT CHANGE UP (ref 50–242)
MAGNESIUM SERPL-MCNC: 1.8 MG/DL — SIGNIFICANT CHANGE UP (ref 1.6–2.6)
MCHC RBC-ENTMCNC: 29.6 PG — SIGNIFICANT CHANGE UP (ref 27–34)
MCHC RBC-ENTMCNC: 36.8 GM/DL — HIGH (ref 32–36)
MCV RBC AUTO: 80.6 FL — SIGNIFICANT CHANGE UP (ref 80–100)
NRBC # BLD: 0 /100 WBCS — SIGNIFICANT CHANGE UP (ref 0–0)
PHOSPHATE SERPL-MCNC: 3.1 MG/DL — SIGNIFICANT CHANGE UP (ref 2.5–4.5)
PLATELET # BLD AUTO: 23 K/UL — LOW (ref 150–400)
POTASSIUM SERPL-MCNC: 3.5 MMOL/L — SIGNIFICANT CHANGE UP (ref 3.5–5.3)
POTASSIUM SERPL-SCNC: 3.5 MMOL/L — SIGNIFICANT CHANGE UP (ref 3.5–5.3)
PROT SERPL-MCNC: 6.5 G/DL — SIGNIFICANT CHANGE UP (ref 6–8.3)
PROTHROM AB SERPL-ACNC: 13.5 SEC — SIGNIFICANT CHANGE UP (ref 10.6–13.6)
RBC # BLD: 2.53 M/UL — LOW (ref 4.2–5.8)
RBC # FLD: 12.1 % — SIGNIFICANT CHANGE UP (ref 10.3–14.5)
SARS-COV-2 RNA SPEC QL NAA+PROBE: SIGNIFICANT CHANGE UP
SODIUM SERPL-SCNC: 136 MMOL/L — SIGNIFICANT CHANGE UP (ref 135–145)
URATE SERPL-MCNC: 2.2 MG/DL — LOW (ref 3.4–8.8)
WBC # BLD: 0.36 K/UL — CRITICAL LOW (ref 3.8–10.5)
WBC # FLD AUTO: 0.36 K/UL — CRITICAL LOW (ref 3.8–10.5)

## 2022-01-09 PROCEDURE — 99232 SBSQ HOSP IP/OBS MODERATE 35: CPT | Mod: GC

## 2022-01-09 PROCEDURE — 70487 CT MAXILLOFACIAL W/DYE: CPT | Mod: 26

## 2022-01-09 RX ORDER — AMPICILLIN SODIUM AND SULBACTAM SODIUM 250; 125 MG/ML; MG/ML
INJECTION, POWDER, FOR SUSPENSION INTRAMUSCULAR; INTRAVENOUS
Refills: 0 | Status: COMPLETED | OUTPATIENT
Start: 2022-01-09 | End: 2022-01-19

## 2022-01-09 RX ORDER — AZITHROMYCIN 500 MG/1
500 TABLET, FILM COATED ORAL DAILY
Refills: 0 | Status: DISCONTINUED | OUTPATIENT
Start: 2022-01-09 | End: 2022-01-09

## 2022-01-09 RX ORDER — AMPICILLIN SODIUM AND SULBACTAM SODIUM 250; 125 MG/ML; MG/ML
3 INJECTION, POWDER, FOR SUSPENSION INTRAMUSCULAR; INTRAVENOUS EVERY 6 HOURS
Refills: 0 | Status: COMPLETED | OUTPATIENT
Start: 2022-01-10 | End: 2022-01-19

## 2022-01-09 RX ORDER — AMPICILLIN SODIUM AND SULBACTAM SODIUM 250; 125 MG/ML; MG/ML
3 INJECTION, POWDER, FOR SUSPENSION INTRAMUSCULAR; INTRAVENOUS ONCE
Refills: 0 | Status: COMPLETED | OUTPATIENT
Start: 2022-01-09 | End: 2022-01-09

## 2022-01-09 RX ORDER — OXYMETAZOLINE HYDROCHLORIDE 0.5 MG/ML
1 SPRAY NASAL EVERY 12 HOURS
Refills: 0 | Status: DISCONTINUED | OUTPATIENT
Start: 2022-01-09 | End: 2022-01-10

## 2022-01-09 RX ORDER — OXYCODONE HYDROCHLORIDE 5 MG/1
5 TABLET ORAL ONCE
Refills: 0 | Status: DISCONTINUED | OUTPATIENT
Start: 2022-01-09 | End: 2022-01-09

## 2022-01-09 RX ORDER — AMPICILLIN SODIUM AND SULBACTAM SODIUM 250; 125 MG/ML; MG/ML
INJECTION, POWDER, FOR SUSPENSION INTRAMUSCULAR; INTRAVENOUS
Refills: 0 | Status: DISCONTINUED | OUTPATIENT
Start: 2022-01-09 | End: 2022-01-09

## 2022-01-09 RX ADMIN — Medication 400 MILLIGRAM(S): at 06:44

## 2022-01-09 RX ADMIN — CEFEPIME 100 MILLIGRAM(S): 1 INJECTION, POWDER, FOR SOLUTION INTRAMUSCULAR; INTRAVENOUS at 13:16

## 2022-01-09 RX ADMIN — Medication 15 MILLILITER(S): at 11:44

## 2022-01-09 RX ADMIN — Medication 400 MILLIGRAM(S): at 13:15

## 2022-01-09 RX ADMIN — LORATADINE 10 MILLIGRAM(S): 10 TABLET ORAL at 11:39

## 2022-01-09 RX ADMIN — ATOVAQUONE 1500 MILLIGRAM(S): 750 SUSPENSION ORAL at 11:40

## 2022-01-09 RX ADMIN — Medication 15 MILLILITER(S): at 00:04

## 2022-01-09 RX ADMIN — SODIUM CHLORIDE 50 MILLILITER(S): 9 INJECTION INTRAMUSCULAR; INTRAVENOUS; SUBCUTANEOUS at 06:45

## 2022-01-09 RX ADMIN — CYCLOSPORINE 350 MILLIGRAM(S): 100 CAPSULE ORAL at 17:10

## 2022-01-09 RX ADMIN — OXYMETAZOLINE HYDROCHLORIDE 1 SPRAY(S): 0.5 SPRAY NASAL at 20:02

## 2022-01-09 RX ADMIN — Medication 1 SPRAY(S): at 17:13

## 2022-01-09 RX ADMIN — ELTROMBOPAG OLAMINE 150 MILLIGRAM(S): 50 TABLET, FILM COATED ORAL at 11:39

## 2022-01-09 RX ADMIN — Medication 15 MILLILITER(S): at 23:32

## 2022-01-09 RX ADMIN — PANTOPRAZOLE SODIUM 40 MILLIGRAM(S): 20 TABLET, DELAYED RELEASE ORAL at 06:47

## 2022-01-09 RX ADMIN — Medication 5 MILLIGRAM(S): at 11:46

## 2022-01-09 RX ADMIN — CHLORHEXIDINE GLUCONATE 1 APPLICATION(S): 213 SOLUTION TOPICAL at 11:47

## 2022-01-09 RX ADMIN — FLUCONAZOLE 200 MILLIGRAM(S): 150 TABLET ORAL at 11:40

## 2022-01-09 RX ADMIN — FAMOTIDINE 20 MILLIGRAM(S): 10 INJECTION INTRAVENOUS at 11:41

## 2022-01-09 RX ADMIN — OXYCODONE HYDROCHLORIDE 5 MILLIGRAM(S): 5 TABLET ORAL at 12:15

## 2022-01-09 RX ADMIN — Medication 100 MILLIGRAM(S): at 11:41

## 2022-01-09 RX ADMIN — AMPICILLIN SODIUM AND SULBACTAM SODIUM 200 GRAM(S): 250; 125 INJECTION, POWDER, FOR SUSPENSION INTRAMUSCULAR; INTRAVENOUS at 23:32

## 2022-01-09 RX ADMIN — CYCLOSPORINE 350 MILLIGRAM(S): 100 CAPSULE ORAL at 06:45

## 2022-01-09 RX ADMIN — AMPICILLIN SODIUM AND SULBACTAM SODIUM 200 GRAM(S): 250; 125 INJECTION, POWDER, FOR SUSPENSION INTRAMUSCULAR; INTRAVENOUS at 17:13

## 2022-01-09 RX ADMIN — Medication 1 SPRAY(S): at 06:44

## 2022-01-09 RX ADMIN — Medication 15 MILLILITER(S): at 17:13

## 2022-01-09 RX ADMIN — SODIUM CHLORIDE 50 MILLILITER(S): 9 INJECTION INTRAMUSCULAR; INTRAVENOUS; SUBCUTANEOUS at 20:03

## 2022-01-09 RX ADMIN — CEFEPIME 100 MILLIGRAM(S): 1 INJECTION, POWDER, FOR SOLUTION INTRAMUSCULAR; INTRAVENOUS at 06:46

## 2022-01-09 RX ADMIN — Medication 15 MILLILITER(S): at 06:46

## 2022-01-09 RX ADMIN — Medication 400 MILLIGRAM(S): at 23:32

## 2022-01-09 RX ADMIN — OXYCODONE HYDROCHLORIDE 5 MILLIGRAM(S): 5 TABLET ORAL at 11:36

## 2022-01-09 NOTE — PROGRESS NOTE ADULT - PROBLEM SELECTOR PLAN 3
12/24-  CT maxillofacial with contrast and CT neck with contrast to r/o abscess. (-)  12/26- pain control., First mouth wash, local Lidocaine adm with Q tips.  Dental following 12/24-  CT maxillofacial with contrast and CT neck with contrast to r/o abscess. (-)  12/26- pain control., First mouth wash, local Lidocaine adm with Q tips.

## 2022-01-09 NOTE — PROGRESS NOTE ADULT - PROBLEM SELECTOR PLAN 2
Neutropenic, afebrile   continue cefepime , diflucan, Acyclovir for now  throat culture (-). MRSA (-)  CT CAP  to look for source of infection- No CT evidence for source of infection in the chest, abdomen or pelvis.  12/24- Staph aureus PCR detected, will start Vanco 1 gm Q 12 hrs stopped 12/26. CT maxillo facial did not reveal any abscess.   1/3 Started Mepron for ppx  1/5 if fever recurs will discontinue Diflucan and start voriconazole as per ID  ID following Neutropenic, afebrile   continue diflucan, Acyclovir.  CT CAP  to look for source of infection- No CT evidence for source of infection in the chest, abdomen or pelvis.  12/24- Staph aureus PCR detected, will start Vanco 1 gm Q 12 hrs stopped 12/26. CT maxillo facial did not reveal any abscess.   1/3 Started Mepron for ppx  1/5 if fever recurs will discontinue Diflucan and start voriconazole as per ID.  1/9- CT sinus with contrast shows pansinusitis, ENT consulted started on Unasyn 3gm Q 6 hrs, d/adriana Cefepime and Zithromax.  ID following

## 2022-01-09 NOTE — CONSULT NOTE ADULT - SUBJECTIVE AND OBJECTIVE BOX
CC: Headache/sinus pain and pressure    HPI: 35 y/o Male w/ phx of aplastic anemia dx via bone biopsy at [facility] 4/2020 following with Dr. Goldberg and currently in treatment with cyclosporine who presents with  nose bleeding and fever of Tmax 103. Pt had rigors through out the night 12/21. Pt took Tylenol and said he was fine afterwards. Pt had intermittent nose bleeds through out the day that is currently controlled. ENT was consulted for chronic head aches and worsening sinus pain and pressure.        PAST MEDICAL & SURGICAL HISTORY:  Asthma, stable    Redundant prepuce and phimosis    H/O circumcision      Allergies    No Known Allergies    Intolerances      MEDICATIONS  (STANDING):  acyclovir   Oral Tab/Cap 400 milliGRAM(s) Oral every 8 hours  atovaquone  Suspension 1500 milliGRAM(s) Oral daily  azithromycin   Tablet 500 milliGRAM(s) Oral daily  Biotene Dry Mouth Oral Rinse 15 milliLiter(s) Swish and Spit four times a day  cefepime   IVPB 2000 milliGRAM(s) IV Intermittent every 8 hours  chlorhexidine 2% Cloths 1 Application(s) Topical <User Schedule>  cycloSPORINE  (SandIMMUNE) 350 milliGRAM(s) Oral every 12 hours  eltrombopag 150 milliGRAM(s) Oral daily  famotidine    Tablet 20 milliGRAM(s) Oral daily  FIRST- Mouthwash  BLM 10 milliLiter(s) Swish and Spit four times a day  fluconAZOLE   Tablet 200 milliGRAM(s) Oral daily  fluticasone propionate 50 MICROgram(s)/spray Nasal Spray 1 Spray(s) Both Nostrils two times a day  lidocaine 2% Viscous 2 milliLiter(s) Oral four times a day  loratadine 10 milliGRAM(s) Oral daily  methylPREDNISolone sodium succinate IVPB 500 milliGRAM(s) IV Intermittent once  pantoprazole    Tablet 40 milliGRAM(s) Oral before breakfast  phytonadione   Solution 5 milliGRAM(s) Oral daily  predniSONE   Tablet 100 milliGRAM(s) Oral daily  sodium chloride 0.65% Nasal 1 Spray(s) Both Nostrils four times a day  sodium chloride 0.9%. 1000 milliLiter(s) (50 mL/Hr) IV Continuous <Continuous>    MEDICATIONS  (PRN):  acetaminophen     Tablet .. 650 milliGRAM(s) Oral every 6 hours PRN Temp greater or equal to 38C (100.4F), Mild Pain (1 - 3)  acetaminophen   IVPB .. 1000 milliGRAM(s) IV Intermittent once PRN Temp greater or equal to 38C (100.4F)  aluminum hydroxide/magnesium hydroxide/simethicone Suspension 30 milliLiter(s) Oral every 4 hours PRN Dyspepsia  calcium carbonate    500 mG (Tums) Chewable 1 Tablet(s) Chew every 4 hours PRN Heartburn  diphenhydrAMINE 25 milliGRAM(s) Oral every 12 hours PRN Rash and/or Itching  diphenhydrAMINE Injectable 25 milliGRAM(s) IV Push once PRN Rash  EPINEPHrine     1 mG/mL Injectable 0.3 milliGRAM(s) IntraMuscular once PRN anaphylactic reaction  hydrocortisone sodium succinate Injectable 50 milliGRAM(s) IV Push every 12 hours PRN pre transfusion  metoclopramide Injectable 10 milliGRAM(s) IV Push every 6 hours PRN for nausea and or vomiting  ondansetron Injectable 8 milliGRAM(s) IV Push every 8 hours PRN Nausea and/or Vomiting      Social History: **??**    Family history: **??**    ROS:   ENT: all negative except as noted in HPI   CV: denies palpitations  Pulm: denies SOB, cough, hemoptysis  GI: denies change in apetite, indigestion, n/v  : denies pertinent urinary symptoms, urgency  Neuro: denies numbness/tingling, loss of sensation  Psych: denies anxiety  MS: denies muscle weakness, instability  Heme: denies easy bruising or bleeding  Endo: denies heat/cold intolerance, excessive sweating  Vascular: denies LE edema    Vital Signs Last 24 Hrs  T(C): 36.7 (09 Jan 2022 05:53), Max: 37.1 (08 Jan 2022 15:45)  T(F): 98.1 (09 Jan 2022 05:53), Max: 98.8 (08 Jan 2022 15:45)  HR: 86 (09 Jan 2022 05:53) (71 - 91)  BP: 139/82 (09 Jan 2022 05:53) (116/68 - 143/83)  BP(mean): --  RR: 18 (09 Jan 2022 05:53) (18 - 18)  SpO2: 96% (09 Jan 2022 05:53) (96% - 97%)                          7.5    0.36  )-----------( 23       ( 09 Jan 2022 08:05 )             20.4    01-09    136  |  102  |  22  ----------------------------<  98  3.5   |  21<L>  |  0.80    Ca    9.1      09 Jan 2022 08:05  Phos  3.1     01-09  Mg     1.8     01-09    TPro  6.5  /  Alb  3.8  /  TBili  1.3<H>  /  DBili  x   /  AST  8<L>  /  ALT  36  /  AlkPhos  62  01-09   PT/INR - ( 09 Jan 2022 08:05 )   PT: 13.5 sec;   INR: 1.13 ratio         PTT - ( 09 Jan 2022 08:05 )  PTT:25.0 sec    PHYSICAL EXAM:  Gen: NAD  Skin: No rashes, bruises, or lesions  Head: Normocephalic, Atraumatic  Face: no edema, erythema, or fluctuance. Parotid glands soft without mass  Eyes: no scleral injection  Ears: Right - ear canal clear, TM intact without effusion or erythema. No evidence of any fluid drainage. No mastoid tenderness, erythema, or ear bulging            Left - ear canal clear, TM intact without effusion or erythema. No evidence of any fluid drainage. No mastoid tenderness, erythema, or ear bulging  Nose: Nares bilaterally patent, no discharge  Mouth: No Stridor / Drooling / Trismus.  Mucosa moist, tongue/uvula midline, oropharynx clear  Neck: Flat, supple, no lymphadenopathy, trachea midline, no masses  Lymphatic: No lymphadenopathy  Resp: breathing easily, no stridor  CV: no peripheral edema/cyanosis  GI: nondistended   Peripheral vascular: no JVD or edema  Neuro: facial nerve intact, no facial droop      Diagnostic Nasal Endoscopy: (Scope #2 used)      IMAGING/ADDITIONAL STUDIES:  CC: Headache/sinus pain and pressure    HPI: 37 y/o Male w/ phx of aplastic anemia dx via bone biopsy at [facility] 4/2020 following with Dr. Goldberg and currently in treatment with cyclosporine who presents with  nose bleeding and fever of Tmax 103. Pt had rigors through out the night on 12/21. Pt took Tylenol and said he was fine afterwards. Pt had intermittent nose bleeds through out the day that is currently controlled. ENT was consulted for chronic head aches and worsening sinus pain and pressure. The patient states his symptoms have been going on for about 36 hours. He has pain and pressure over his frontal sinus, behind his eyes and temples. He states today he started to have some numbness tingling above his eye brows. His sinus pain is associated with headaches. He states Tylenol is not helping him but oxycodone helped with his HA. He denies any facial weakness, loss of sensation, throat pain, ear pain, tinnitus, dizziness, LOC, CP, SOB, fever, chills, n/v/d.         PAST MEDICAL & SURGICAL HISTORY:  Asthma, stable    Redundant prepuce and phimosis    H/O circumcision      Allergies    No Known Allergies    Intolerances      MEDICATIONS  (STANDING):  acyclovir   Oral Tab/Cap 400 milliGRAM(s) Oral every 8 hours  atovaquone  Suspension 1500 milliGRAM(s) Oral daily  azithromycin   Tablet 500 milliGRAM(s) Oral daily  Biotene Dry Mouth Oral Rinse 15 milliLiter(s) Swish and Spit four times a day  cefepime   IVPB 2000 milliGRAM(s) IV Intermittent every 8 hours  chlorhexidine 2% Cloths 1 Application(s) Topical <User Schedule>  cycloSPORINE  (SandIMMUNE) 350 milliGRAM(s) Oral every 12 hours  eltrombopag 150 milliGRAM(s) Oral daily  famotidine    Tablet 20 milliGRAM(s) Oral daily  FIRST- Mouthwash  BLM 10 milliLiter(s) Swish and Spit four times a day  fluconAZOLE   Tablet 200 milliGRAM(s) Oral daily  fluticasone propionate 50 MICROgram(s)/spray Nasal Spray 1 Spray(s) Both Nostrils two times a day  lidocaine 2% Viscous 2 milliLiter(s) Oral four times a day  loratadine 10 milliGRAM(s) Oral daily  methylPREDNISolone sodium succinate IVPB 500 milliGRAM(s) IV Intermittent once  pantoprazole    Tablet 40 milliGRAM(s) Oral before breakfast  phytonadione   Solution 5 milliGRAM(s) Oral daily  predniSONE   Tablet 100 milliGRAM(s) Oral daily  sodium chloride 0.65% Nasal 1 Spray(s) Both Nostrils four times a day  sodium chloride 0.9%. 1000 milliLiter(s) (50 mL/Hr) IV Continuous <Continuous>    MEDICATIONS  (PRN):  acetaminophen     Tablet .. 650 milliGRAM(s) Oral every 6 hours PRN Temp greater or equal to 38C (100.4F), Mild Pain (1 - 3)  acetaminophen   IVPB .. 1000 milliGRAM(s) IV Intermittent once PRN Temp greater or equal to 38C (100.4F)  aluminum hydroxide/magnesium hydroxide/simethicone Suspension 30 milliLiter(s) Oral every 4 hours PRN Dyspepsia  calcium carbonate    500 mG (Tums) Chewable 1 Tablet(s) Chew every 4 hours PRN Heartburn  diphenhydrAMINE 25 milliGRAM(s) Oral every 12 hours PRN Rash and/or Itching  diphenhydrAMINE Injectable 25 milliGRAM(s) IV Push once PRN Rash  EPINEPHrine     1 mG/mL Injectable 0.3 milliGRAM(s) IntraMuscular once PRN anaphylactic reaction  hydrocortisone sodium succinate Injectable 50 milliGRAM(s) IV Push every 12 hours PRN pre transfusion  metoclopramide Injectable 10 milliGRAM(s) IV Push every 6 hours PRN for nausea and or vomiting  ondansetron Injectable 8 milliGRAM(s) IV Push every 8 hours PRN Nausea and/or Vomiting      Social History: no tobacco use, alcohol or illicit drug use    Family history:  type 2 diabetes mellitus    ROS:   ENT: all negative except as noted in HPI   CV: denies palpitations  Pulm: denies SOB, cough, hemoptysis  GI: denies change in apetite, indigestion, n/v  : denies pertinent urinary symptoms, urgency  Neuro: denies numbness/tingling, loss of sensation  Psych: denies anxiety  MS: denies muscle weakness, instability  Heme: denies easy bruising or bleeding  Endo: denies heat/cold intolerance, excessive sweating  Vascular: denies LE edema    Vital Signs Last 24 Hrs  T(C): 36.7 (09 Jan 2022 05:53), Max: 37.1 (08 Jan 2022 15:45)  T(F): 98.1 (09 Jan 2022 05:53), Max: 98.8 (08 Jan 2022 15:45)  HR: 86 (09 Jan 2022 05:53) (71 - 91)  BP: 139/82 (09 Jan 2022 05:53) (116/68 - 143/83)  BP(mean): --  RR: 18 (09 Jan 2022 05:53) (18 - 18)  SpO2: 96% (09 Jan 2022 05:53) (96% - 97%)                          7.5    0.36  )-----------( 23       ( 09 Jan 2022 08:05 )             20.4    01-09    136  |  102  |  22  ----------------------------<  98  3.5   |  21<L>  |  0.80    Ca    9.1      09 Jan 2022 08:05  Phos  3.1     01-09  Mg     1.8     01-09    TPro  6.5  /  Alb  3.8  /  TBili  1.3<H>  /  DBili  x   /  AST  8<L>  /  ALT  36  /  AlkPhos  62  01-09   PT/INR - ( 09 Jan 2022 08:05 )   PT: 13.5 sec;   INR: 1.13 ratio         PTT - ( 09 Jan 2022 08:05 )  PTT:25.0 sec    PHYSICAL EXAM:  Gen: NAD  Skin: No rashes, bruises, or lesions  Head: Normocephalic, Atraumatic  Face: no edema, erythema, or fluctuance. Parotid glands soft without mass, + frontal sinus tenderness on palpation, + temple tenderness on palpation   Eyes: no scleral injection  Ears: Right-No mastoid tenderness, erythema, or ear bulging            Left - No mastoid tenderness, erythema, or ear bulging  Nose: Nares bilaterally patent, nasal mucosa pink, no dusky/black or gray nasal mucosa, no discharge  Mouth: No Stridor / Drooling / Trismus.  Mucosa moist, tongue/uvula midline, oropharynx clear  Neck: Flat, supple, no lymphadenopathy, trachea midline, no masses  Lymphatic: No lymphadenopathy  Resp: non labored, no stridor  CV: no peripheral edema/cyanosis  GI: nondistended   Peripheral vascular: no JVD or edema  Neuro: facial nerve intact, no facial droop        IMAGING/ADDITIONAL STUDIES:   < from: CT Sinuses w/ IV Cont (01.09.22 @ 11:32) >  INTERPRETATION:  .    CLINICAL INFORMATION: Relapsed aplastic anemia. Sinus tenderness.   Evaluate for infectious process.    TECHNIQUE: Axial CT images were obtained through the paranasal sinuses,   and orbits. 70 cc's of IV Omnipaque 300 was administered without   immediate complication and 30 cc's was discarded. Coronal and sagittal   reconstruction images were also obtained.    COMPARISON: No prior sinus CT studies are available for comparison.   Comparison is made with prior neck CT examination from 12/25/2021.    FINDINGS: The paranasal sinuses are well-developed and the walls   surrounding them are intact. Near complete opacification of the bifrontal   sinuses and outflow tracts, slightly worsened when compared to the prior   study. Scattered opacification of the bilateral ethmoid air cells are   notable. Mucosal thickening and aerated secretions are seen within the   left sphenoid sinus. Partial obstruction of the left sphenoethmoidal   recess is appreciated. The right sphenoid sinus is mostly clear. The   right sphenoethmoidal recess is clear.    Polypoidal mucosal thickening is seen along the walls of the maxillary   sinuses. Previously seen air-fluid level in the left maxillary sinus has   resolved in the interim.    The bilateral ostiomeatal complexes are at least partially obstructed.    Rightward deviation of the nasal septum is seen with septal spurring.   Secretions within the bilateral superior nasal cavities are again notable.    The bilateral globes, extra ocular muscles, optic nerves, and orbital fat   appear unremarkable.    The maxilla and imaged portions of the mandible are intact.    Limited field-of-view through the intracranial structures and stress no   abnormalities.    IMPRESSION: Pansinusitis.    --- End of Report ---    < end of copied text >

## 2022-01-09 NOTE — PROGRESS NOTE ADULT - SUBJECTIVE AND OBJECTIVE BOX
Diagnosis:    Protocol/Chemo Regimen:    Day:     Pt endorsed:    Review of Systems:     Pain scale:     Diet:     Allergies    No Known Allergies    Intolerances        ANTIMICROBIALS  acyclovir   Oral Tab/Cap 400 milliGRAM(s) Oral every 8 hours  atovaquone  Suspension 1500 milliGRAM(s) Oral daily  cefepime   IVPB 2000 milliGRAM(s) IV Intermittent every 8 hours  fluconAZOLE   Tablet 200 milliGRAM(s) Oral daily      HEME/ONC MEDICATIONS  eltrombopag 150 milliGRAM(s) Oral daily      STANDING MEDICATIONS  Biotene Dry Mouth Oral Rinse 15 milliLiter(s) Swish and Spit four times a day  chlorhexidine 2% Cloths 1 Application(s) Topical <User Schedule>  cycloSPORINE  (SandIMMUNE) 350 milliGRAM(s) Oral every 12 hours  famotidine    Tablet 20 milliGRAM(s) Oral daily  FIRST- Mouthwash  BLM 10 milliLiter(s) Swish and Spit four times a day  fluticasone propionate 50 MICROgram(s)/spray Nasal Spray 1 Spray(s) Both Nostrils two times a day  lidocaine 2% Viscous 2 milliLiter(s) Oral four times a day  loratadine 10 milliGRAM(s) Oral daily  methylPREDNISolone sodium succinate IVPB 500 milliGRAM(s) IV Intermittent once  pantoprazole    Tablet 40 milliGRAM(s) Oral before breakfast  phytonadione   Solution 5 milliGRAM(s) Oral daily  predniSONE   Tablet 100 milliGRAM(s) Oral daily  sodium chloride 0.65% Nasal 1 Spray(s) Both Nostrils four times a day  sodium chloride 0.9%. 1000 milliLiter(s) IV Continuous <Continuous>      PRN MEDICATIONS  acetaminophen     Tablet .. 650 milliGRAM(s) Oral every 6 hours PRN  acetaminophen   IVPB .. 1000 milliGRAM(s) IV Intermittent once PRN  aluminum hydroxide/magnesium hydroxide/simethicone Suspension 30 milliLiter(s) Oral every 4 hours PRN  calcium carbonate    500 mG (Tums) Chewable 1 Tablet(s) Chew every 4 hours PRN  diphenhydrAMINE 25 milliGRAM(s) Oral every 12 hours PRN  diphenhydrAMINE Injectable 25 milliGRAM(s) IV Push once PRN  EPINEPHrine     1 mG/mL Injectable 0.3 milliGRAM(s) IntraMuscular once PRN  hydrocortisone sodium succinate Injectable 50 milliGRAM(s) IV Push every 12 hours PRN  metoclopramide Injectable 10 milliGRAM(s) IV Push every 6 hours PRN  ondansetron Injectable 8 milliGRAM(s) IV Push every 8 hours PRN        Vital Signs Last 24 Hrs  T(C): 36.7 (09 Jan 2022 05:53), Max: 37.5 (08 Jan 2022 10:28)  T(F): 98.1 (09 Jan 2022 05:53), Max: 99.5 (08 Jan 2022 10:28)  HR: 86 (09 Jan 2022 05:53) (71 - 113)  BP: 139/82 (09 Jan 2022 05:53) (116/68 - 143/83)  BP(mean): --  RR: 18 (09 Jan 2022 05:53) (18 - 18)  SpO2: 96% (09 Jan 2022 05:53) (96% - 99%)    PHYSICAL EXAM  General: NAD  HEENT: PERRLA, EOMOI, clear oropharynx, anicteric sclera, pink conjunctiva  Neck: supple  CV: (+) S1/S2 RRR  Lungs: clear to auscultation, no wheezes or rales  Abdomen: soft, non-tender, non-distended (+) BS  Ext: no clubbing, cyanosis or edema  Skin: no rashes and no petechiae  Neuro: alert and oriented X 3, no focal deficits  Central Line:     RECENT CULTURES:        LABS:                        6.9    0.14  )-----------( 7        ( 08 Jan 2022 07:22 )             18.8         Mean Cell Volume : 82.8 fl  Mean Cell Hemoglobin : 30.4 pg  Mean Cell Hemoglobin Concentration : 36.7 gm/dL  Auto Neutrophil # : x  Auto Lymphocyte # : x  Auto Monocyte # : x  Auto Eosinophil # : x  Auto Basophil # : x  Auto Neutrophil % : x  Auto Lymphocyte % : x  Auto Monocyte % : x  Auto Eosinophil % : x  Auto Basophil % : x      01-08    137  |  102  |  21  ----------------------------<  103<H>  3.6   |  22  |  0.74    Ca    9.0      08 Jan 2022 07:25  Phos  3.0     01-08  Mg     1.7     01-08    TPro  6.8  /  Alb  3.9  /  TBili  1.5<H>  /  DBili  x   /  AST  14  /  ALT  42  /  AlkPhos  59  01-08          PT/INR - ( 08 Jan 2022 10:06 )   PT: 14.4 sec;   INR: 1.21 ratio         PTT - ( 08 Jan 2022 10:06 )  PTT:24.8 sec        RADIOLOGY & ADDITIONAL STUDIES:         Diagnosis: Relapsed aplastic anemia    Protocol/Chemo Regimen:  Rabbit ATG/Cyclosporine/prednisone/Promacta     Day: D14 CSA / Rabbit ATG day 8  (D2 suspended due to reaction; resumed on 1/3)     Pt endorsed: +Sinus pain / pressure    Review of Systems: denies nausea, vomiting, diarrhea, chest pain, sob    Pain scale: 0                                           Diet: regular    Allergies: No Known Allergies    ANTIMICROBIALS  acyclovir   Oral Tab/Cap 400 milliGRAM(s) Oral every 8 hours  atovaquone  Suspension 1500 milliGRAM(s) Oral daily  cefepime   IVPB 2000 milliGRAM(s) IV Intermittent every 8 hours  fluconAZOLE   Tablet 200 milliGRAM(s) Oral daily    HEME/ONC MEDICATIONS  eltrombopag 150 milliGRAM(s) Oral daily    STANDING MEDICATIONS  Biotene Dry Mouth Oral Rinse 15 milliLiter(s) Swish and Spit four times a day  chlorhexidine 2% Cloths 1 Application(s) Topical <User Schedule>  cycloSPORINE  (SandIMMUNE) 350 milliGRAM(s) Oral every 12 hours  famotidine    Tablet 20 milliGRAM(s) Oral daily  FIRST- Mouthwash  BLM 10 milliLiter(s) Swish and Spit four times a day  fluticasone propionate 50 MICROgram(s)/spray Nasal Spray 1 Spray(s) Both Nostrils two times a day  lidocaine 2% Viscous 2 milliLiter(s) Oral four times a day  loratadine 10 milliGRAM(s) Oral daily  methylPREDNISolone sodium succinate IVPB 500 milliGRAM(s) IV Intermittent once  pantoprazole    Tablet 40 milliGRAM(s) Oral before breakfast  phytonadione   Solution 5 milliGRAM(s) Oral daily  predniSONE   Tablet 100 milliGRAM(s) Oral daily  sodium chloride 0.65% Nasal 1 Spray(s) Both Nostrils four times a day  sodium chloride 0.9%. 1000 milliLiter(s) IV Continuous <Continuous>    PRN MEDICATIONS  acetaminophen     Tablet .. 650 milliGRAM(s) Oral every 6 hours PRN  acetaminophen   IVPB .. 1000 milliGRAM(s) IV Intermittent once PRN  aluminum hydroxide/magnesium hydroxide/simethicone Suspension 30 milliLiter(s) Oral every 4 hours PRN  calcium carbonate    500 mG (Tums) Chewable 1 Tablet(s) Chew every 4 hours PRN  diphenhydrAMINE 25 milliGRAM(s) Oral every 12 hours PRN  diphenhydrAMINE Injectable 25 milliGRAM(s) IV Push once PRN  EPINEPHrine     1 mG/mL Injectable 0.3 milliGRAM(s) IntraMuscular once PRN  hydrocortisone sodium succinate Injectable 50 milliGRAM(s) IV Push every 12 hours PRN  metoclopramide Injectable 10 milliGRAM(s) IV Push every 6 hours PRN  ondansetron Injectable 8 milliGRAM(s) IV Push every 8 hours PRN    Vital Signs Last 24 Hrs  T(C): 36.7 (09 Jan 2022 05:53), Max: 37.5 (08 Jan 2022 10:28)  T(F): 98.1 (09 Jan 2022 05:53), Max: 99.5 (08 Jan 2022 10:28)  HR: 86 (09 Jan 2022 05:53) (71 - 113)  BP: 139/82 (09 Jan 2022 05:53) (116/68 - 143/83)  BP(mean): --  RR: 18 (09 Jan 2022 05:53) (18 - 18)  SpO2: 96% (09 Jan 2022 05:53) (96% - 99%)    PHYSICAL EXAM  General: adult in NAD  HEENT: clear oropharynx, +tenderness at the ethmoidal sinus  CV: normal S1/S2 RRR  Lungs: clear to auscultation, no wheezes, no rales  Abdomen: soft non-tender non-distended  Ext: no edema  Skin: no rash  Neuro: alert and oriented X 4  Central Line: RUE PICC  c/d/i    RECENT CULTURES:  Culture - Blood (12.29.21 @ 21:56)    Specimen Source: .Blood Blood-Peripheral    Culture Results:   No Growth Final    Culture - Urine (12.30.21 @ 00:57)    Specimen Source: Clean Catch Clean Catch (Midstream)    Culture Results:   No growth    LABS:                        7.5    0.36  )-----------( 23       ( 09 Jan 2022 08:05 )             20.4     Mean Cell Volume : 80.6 fl  Mean Cell Hemoglobin : 29.6 pg  Mean Cell Hemoglobin Concentration : 36.8 gm/dL  Auto Neutrophil # : x  Auto Lymphocyte # : x  Auto Monocyte # : x  Auto Eosinophil # : x  Auto Basophil # : x  Auto Neutrophil % : x  Auto Lymphocyte % : x  Auto Monocyte % : x  Auto Eosinophil % : x  Auto Basophil % : x    01-09    136  |  102  |  22  ----------------------------<  98  3.5   |  21<L>  |  0.80    Ca    9.1      09 Jan 2022 08:05  Phos  3.1     01-09  Mg     1.8     01-09    TPro  6.5  /  Alb  3.8  /  TBili  1.3<H>  /  DBili  x   /  AST  8<L>  /  ALT  36  /  AlkPhos  62  01-09  Mg 1.8  Phos 3.1  PT/INR - ( 09 Jan 2022 08:05 )   PT: 13.5 sec;   INR: 1.13 ratio    PTT - ( 09 Jan 2022 08:05 )  PTT:25.0 sec    Uric Acid 2.2    RADIOLOGY & ADDITIONAL STUDIES:   CT Sinuses w/ IV Cont (01.09.22 @ 11:32) >  Pansinusitis.       Diagnosis: Relapsed aplastic anemia    Protocol/Chemo Regimen:  Rabbit ATG/Cyclosporine/prednisone/Promacta     Day: D14 CSA / Rabbit ATG day 8  (D2 suspended due to reaction; resumed on 1/3)     Pt endorsed: +Sinus pain / pressure    Review of Systems: denies nausea, vomiting, diarrhea, chest pain, sob    Pain scale: 0                                           Diet: regular    Allergies: No Known Allergies    ANTIMICROBIALS  acyclovir   Oral Tab/Cap 400 milliGRAM(s) Oral every 8 hours  atovaquone  Suspension 1500 milliGRAM(s) Oral daily  cefepime   IVPB 2000 milliGRAM(s) IV Intermittent every 8 hours  fluconAZOLE   Tablet 200 milliGRAM(s) Oral daily    HEME/ONC MEDICATIONS  eltrombopag 150 milliGRAM(s) Oral daily    STANDING MEDICATIONS  Biotene Dry Mouth Oral Rinse 15 milliLiter(s) Swish and Spit four times a day  chlorhexidine 2% Cloths 1 Application(s) Topical <User Schedule>  cycloSPORINE  (SandIMMUNE) 350 milliGRAM(s) Oral every 12 hours  famotidine    Tablet 20 milliGRAM(s) Oral daily  FIRST- Mouthwash  BLM 10 milliLiter(s) Swish and Spit four times a day  fluticasone propionate 50 MICROgram(s)/spray Nasal Spray 1 Spray(s) Both Nostrils two times a day  lidocaine 2% Viscous 2 milliLiter(s) Oral four times a day  loratadine 10 milliGRAM(s) Oral daily  methylPREDNISolone sodium succinate IVPB 500 milliGRAM(s) IV Intermittent once  pantoprazole    Tablet 40 milliGRAM(s) Oral before breakfast  phytonadione   Solution 5 milliGRAM(s) Oral daily  predniSONE   Tablet 100 milliGRAM(s) Oral daily  sodium chloride 0.65% Nasal 1 Spray(s) Both Nostrils four times a day  sodium chloride 0.9%. 1000 milliLiter(s) IV Continuous <Continuous>    PRN MEDICATIONS  acetaminophen     Tablet .. 650 milliGRAM(s) Oral every 6 hours PRN  acetaminophen   IVPB .. 1000 milliGRAM(s) IV Intermittent once PRN  aluminum hydroxide/magnesium hydroxide/simethicone Suspension 30 milliLiter(s) Oral every 4 hours PRN  calcium carbonate    500 mG (Tums) Chewable 1 Tablet(s) Chew every 4 hours PRN  diphenhydrAMINE 25 milliGRAM(s) Oral every 12 hours PRN  diphenhydrAMINE Injectable 25 milliGRAM(s) IV Push once PRN  EPINEPHrine     1 mG/mL Injectable 0.3 milliGRAM(s) IntraMuscular once PRN  hydrocortisone sodium succinate Injectable 50 milliGRAM(s) IV Push every 12 hours PRN  metoclopramide Injectable 10 milliGRAM(s) IV Push every 6 hours PRN  ondansetron Injectable 8 milliGRAM(s) IV Push every 8 hours PRN    Vital Signs Last 24 Hrs  T(C): 36.7 (09 Jan 2022 05:53), Max: 37.5 (08 Jan 2022 10:28)  T(F): 98.1 (09 Jan 2022 05:53), Max: 99.5 (08 Jan 2022 10:28)  HR: 86 (09 Jan 2022 05:53) (71 - 113)  BP: 139/82 (09 Jan 2022 05:53) (116/68 - 143/83)  BP(mean): --  RR: 18 (09 Jan 2022 05:53) (18 - 18)  SpO2: 96% (09 Jan 2022 05:53) (96% - 99%)    PHYSICAL EXAM  General: adult in NAD  HEENT: clear oropharynx, +tenderness at the ethmoidal, frontal sinus  CV: normal S1/S2 RRR  Lungs: clear to auscultation, no wheezes, no rales  Abdomen: soft non-tender non-distended  Ext: no edema  Skin: no rash  Neuro: alert and oriented X 4  Central Line: RUE PICC  c/d/i    RECENT CULTURES:  Culture - Blood (12.29.21 @ 21:56)    Specimen Source: .Blood Blood-Peripheral    Culture Results:   No Growth Final    Culture - Urine (12.30.21 @ 00:57)    Specimen Source: Clean Catch Clean Catch (Midstream)    Culture Results:   No growth    LABS:                        7.5    0.36  )-----------( 23       ( 09 Jan 2022 08:05 )             20.4     Mean Cell Volume : 80.6 fl  Mean Cell Hemoglobin : 29.6 pg  Mean Cell Hemoglobin Concentration : 36.8 gm/dL  Auto Neutrophil # : x  Auto Lymphocyte # : x  Auto Monocyte # : x  Auto Eosinophil # : x  Auto Basophil # : x  Auto Neutrophil % : x  Auto Lymphocyte % : x  Auto Monocyte % : x  Auto Eosinophil % : x  Auto Basophil % : x    01-09    136  |  102  |  22  ----------------------------<  98  3.5   |  21<L>  |  0.80    Ca    9.1      09 Jan 2022 08:05  Phos  3.1     01-09  Mg     1.8     01-09    TPro  6.5  /  Alb  3.8  /  TBili  1.3<H>  /  DBili  x   /  AST  8<L>  /  ALT  36  /  AlkPhos  62  01-09  Mg 1.8  Phos 3.1  PT/INR - ( 09 Jan 2022 08:05 )   PT: 13.5 sec;   INR: 1.13 ratio    PTT - ( 09 Jan 2022 08:05 )  PTT:25.0 sec    Uric Acid 2.2    RADIOLOGY & ADDITIONAL STUDIES:   CT Sinuses w/ IV Cont (01.09.22 @ 11:32) >  Pansinusitis.

## 2022-01-09 NOTE — CONSULT NOTE ADULT - ASSESSMENT
Assessment: 37 y/o Male w/ phx of aplastic anemia dx via bone biopsy at [facility] 4/2020 who was admitted for a nose bleeding and fever of Tmax 103. ENT is now being called for sinus pain and pressure that has been worsening. CT sinus was completed and showed ...      Plan:   Assessment: 35 y/o Male w/ phx of aplastic anemia dx via bone biopsy at [facility] 4/2020 who was admitted for a nose bleeding and fever of Tmax 103. ENT is now being called for sinus pain and pressure that has been worsening. CT sinus was completed and showed pansinusitis. Pt vitals stable with no fever.      Plan:  -Continue use of Flonase 2 sprays in each nostril daily   -Continue ocean spray TID  -Recommend starting Afrin BID sprays every 10-12hours as needed for 4 days  -Recommend starting on Unasyn for pansinusitis   -If symptoms persist repeat CT scan or order an MRI  -ENT will follow  -Plan discussed and agreed with by Dr. Cory Glynn PA  ENT  [id number]

## 2022-01-09 NOTE — PROGRESS NOTE ADULT - ASSESSMENT
Mr. Olivera is a 35 yo M with Aplastic Anemia diagnosed  April 2020, s/p equine ATG, Cyclosporine, and Promacta. Bone marrow biopsy was done on 5/2021 revealed normal morphology and cellularity, eventually Promacta and Cyclosporine was stopped in 5/2021. Then noted his platelets dropping in September 2021.   Now admitted with neutropenic fever found to have pancytopenia secondary to disease condition.  Bone marrow biopsy was repeated on 12/2021 showed decreased megakaryocytes c/w relapsed disease. Rabbit ATG/ prednisone  started on 12/29/21; ATG held starting on 12/30 due to reaction and resumed on 1/3/22. Hospital course complicated by ATG reaction, and neutropenic fever. Pt has pancytopenia secondary to chemotherapy and or disease

## 2022-01-09 NOTE — PROGRESS NOTE ADULT - PROBLEM SELECTOR PLAN 1
Relapsed Aplastic Anemia   s/p hATG, CSA, and promacta with complete remission in 2020, BM bx on 11/9/20 revealed hypocellular marrow.  BMbx 12/2021 showing erythroid predominance, myeloid and erythroid maturation, and markedly decreased megakaryocytes c/w relapse  12/23-   transferred  to [facility] for rATG treatment  Monitor CBC with diff/lytes, transfuse and or replete PRN, Monitor weight, I and O, mouth care  12/26- Started Cyclosporine 300 mg PO Q 12 hrs , 12/27 test dose - no reaction noted  12/28 PICC placed  12/29 Started  Rabbit ATG/prednisone-Reaction to rATG- HELD on 12/30 due to reaction ; Continue promacta  1/3 Resumed  rabbit ATG/prednisone  1/6 completed rATG. PLTs x 1 abg. counts at clint.  1/7 1u pRBC, CSA level 133, increased Cyclosporine dose to 350mg BID. re-check levels qTues/Fri  1/8- F/u Cyclosporine level on 1/9 before am dose. Relapsed Aplastic Anemia   s/p hATG, CSA, and promacta with complete remission in 2020, BM bx on 11/9/20 revealed hypocellular marrow.  BMbx 12/2021 showing erythroid predominance, myeloid and erythroid maturation, and markedly decreased megakaryocytes c/w relapse  12/23-   transferred  to [facility] for rATG treatment  Monitor CBC with diff/lytes, transfuse and or replete PRN, Monitor weight, I and O, mouth care  12/26- Started Cyclosporine 300 mg PO Q 12 hrs , 12/27 test dose - no reaction noted  12/28 PICC placed  12/29 Started  Rabbit ATG/prednisone-Reaction to rATG- HELD on 12/30 due to reaction ; Continue promacta  1/3 Resumed  rabbit ATG/prednisone  1/6 completed rATG. PLTs x 1 abg. counts at clint.  1/7  CSA level 133, increased Cyclosporine dose to 350mg BID. re-check levels qTues/Fri  1/8- F/u Cyclosporine level on 1/9 before am dose- 87.

## 2022-01-09 NOTE — PROGRESS NOTE ADULT - PROBLEM SELECTOR PLAN 5
1/8- Tenderness + at the sinus, will check CT sinus with contrat.  1/8- Flonase and Claritin. 1/8- Tenderness + at the sinus, will check CT sinus with contrat.  1/8- Flonase and Claritin.  1/9- CT Sinus with contrast shows pansinusitis-   Continue ocean spray TID  Started Unasyn for pansinusitis   Start  Afrin BID sprays every 10-12hours as needed for 4 days  -If symptoms persist repeat CT scan or order an MRI  -ENT will follow

## 2022-01-09 NOTE — PROGRESS NOTE ADULT - ATTENDING COMMENTS
36M with Aplastic Anemia diagnosed April 2020.  Underwent treatment with equine ATG, Cyclosporine, and Promacta.  Went into remission (BM bx 5/2021 with normal morphology and cellularity).  Promacta stopped May 2021.  Cyclosporin stopped August 2021.  By September, platelets started to drop.  Now with pancytopenia and neutropenic fever.  No obvious focus of infection. On cefepime, diflucan, acyclovir and mepron.  On cyclosporine 300 mg BID (started on 12/26/21), first level on 12/29/21 --197 increased dose to 325mg bid, repeat level  133, increasing CsA to 350 q12hr.   Today is cyclosporine day 13, day 7 rATG  Completed rATG and continues with prednisone and Promacta.  Doing well, new sinus pressure congestion today, will check CT sinuses   continue tylenol PRN  follow CsA trough, Mg, LFTs 36M with Aplastic Anemia diagnosed April 2020.  Underwent treatment with equine ATG, Cyclosporine, and Promacta.  Went into remission (BM bx 5/2021 with normal morphology and cellularity).  Promacta stopped May 2021.  Cyclosporin stopped August 2021.  By September, platelets started to drop.  Now with pancytopenia and neutropenic fever.  No obvious focus of infection. On cefepime, diflucan, acyclovir and mepron.  On cyclosporine 300 mg BID (started on 12/26/21), first level on 12/29/21 --197 increased dose to 325mg bid, repeat level  133, increasing CsA to 350 q12hr.   Today is cyclosporine day 14, day 8 rATG  Completed rATG and continues with prednisone and Promacta.  CT sinuses with pansinutitis, appreciate ENT recs   continue tylenol PRN  follow CsA trough, Mg, LFTs

## 2022-01-10 ENCOUNTER — RESULT REVIEW (OUTPATIENT)
Age: 37
End: 2022-01-10

## 2022-01-10 LAB
ALBUMIN SERPL ELPH-MCNC: 3.8 G/DL — SIGNIFICANT CHANGE UP (ref 3.3–5)
ALP SERPL-CCNC: 66 U/L — SIGNIFICANT CHANGE UP (ref 40–120)
ALT FLD-CCNC: 36 U/L — SIGNIFICANT CHANGE UP (ref 10–45)
ANION GAP SERPL CALC-SCNC: 14 MMOL/L — SIGNIFICANT CHANGE UP (ref 5–17)
APTT BLD: 25.1 SEC — LOW (ref 27.5–35.5)
AST SERPL-CCNC: 8 U/L — LOW (ref 10–40)
BILIRUB SERPL-MCNC: 1 MG/DL — SIGNIFICANT CHANGE UP (ref 0.2–1.2)
BUN SERPL-MCNC: 25 MG/DL — HIGH (ref 7–23)
CALCIUM SERPL-MCNC: 9.2 MG/DL — SIGNIFICANT CHANGE UP (ref 8.4–10.5)
CHLORIDE SERPL-SCNC: 104 MMOL/L — SIGNIFICANT CHANGE UP (ref 96–108)
CO2 SERPL-SCNC: 21 MMOL/L — LOW (ref 22–31)
CREAT SERPL-MCNC: 0.76 MG/DL — SIGNIFICANT CHANGE UP (ref 0.5–1.3)
CYCLOSPORINE SER-MCNC: 77 NG/ML — LOW (ref 150–400)
FIBRINOGEN PPP-MCNC: 880 MG/DL — HIGH (ref 290–520)
GLUCOSE SERPL-MCNC: 114 MG/DL — HIGH (ref 70–99)
HCT VFR BLD CALC: 19.7 % — CRITICAL LOW (ref 39–50)
HGB BLD-MCNC: 7.2 G/DL — LOW (ref 13–17)
INR BLD: 1.06 RATIO — SIGNIFICANT CHANGE UP (ref 0.88–1.16)
LDH SERPL L TO P-CCNC: 126 U/L — SIGNIFICANT CHANGE UP (ref 50–242)
MAGNESIUM SERPL-MCNC: 1.9 MG/DL — SIGNIFICANT CHANGE UP (ref 1.6–2.6)
MCHC RBC-ENTMCNC: 30.4 PG — SIGNIFICANT CHANGE UP (ref 27–34)
MCHC RBC-ENTMCNC: 36.5 GM/DL — HIGH (ref 32–36)
MCV RBC AUTO: 83.1 FL — SIGNIFICANT CHANGE UP (ref 80–100)
NRBC # BLD: 2 /100 WBCS — HIGH (ref 0–0)
PHOSPHATE SERPL-MCNC: 3.3 MG/DL — SIGNIFICANT CHANGE UP (ref 2.5–4.5)
PLATELET # BLD AUTO: 7 K/UL — CRITICAL LOW (ref 150–400)
POTASSIUM SERPL-MCNC: 3.3 MMOL/L — LOW (ref 3.5–5.3)
POTASSIUM SERPL-SCNC: 3.3 MMOL/L — LOW (ref 3.5–5.3)
PROT SERPL-MCNC: 6.5 G/DL — SIGNIFICANT CHANGE UP (ref 6–8.3)
PROTHROM AB SERPL-ACNC: 12.7 SEC — SIGNIFICANT CHANGE UP (ref 10.6–13.6)
RBC # BLD: 2.37 M/UL — LOW (ref 4.2–5.8)
RBC # FLD: 12.4 % — SIGNIFICANT CHANGE UP (ref 10.3–14.5)
SODIUM SERPL-SCNC: 139 MMOL/L — SIGNIFICANT CHANGE UP (ref 135–145)
URATE SERPL-MCNC: 2.2 MG/DL — LOW (ref 3.4–8.8)
WBC # BLD: 0.4 K/UL — CRITICAL LOW (ref 3.8–10.5)
WBC # FLD AUTO: 0.4 K/UL — CRITICAL LOW (ref 3.8–10.5)

## 2022-01-10 PROCEDURE — 99232 SBSQ HOSP IP/OBS MODERATE 35: CPT | Mod: GC

## 2022-01-10 RX ORDER — CYCLOSPORINE 100 MG/1
400 CAPSULE ORAL EVERY 12 HOURS
Refills: 0 | Status: DISCONTINUED | OUTPATIENT
Start: 2022-01-10 | End: 2022-01-12

## 2022-01-10 RX ORDER — POTASSIUM CHLORIDE 20 MEQ
20 PACKET (EA) ORAL
Refills: 0 | Status: COMPLETED | OUTPATIENT
Start: 2022-01-10 | End: 2022-01-10

## 2022-01-10 RX ORDER — OXYCODONE HYDROCHLORIDE 5 MG/1
5 TABLET ORAL EVERY 6 HOURS
Refills: 0 | Status: DISCONTINUED | OUTPATIENT
Start: 2022-01-10 | End: 2022-01-16

## 2022-01-10 RX ADMIN — ATOVAQUONE 1500 MILLIGRAM(S): 750 SUSPENSION ORAL at 11:59

## 2022-01-10 RX ADMIN — AMPICILLIN SODIUM AND SULBACTAM SODIUM 200 GRAM(S): 250; 125 INJECTION, POWDER, FOR SUSPENSION INTRAMUSCULAR; INTRAVENOUS at 14:09

## 2022-01-10 RX ADMIN — Medication 1 SPRAY(S): at 06:19

## 2022-01-10 RX ADMIN — Medication 15 MILLILITER(S): at 17:37

## 2022-01-10 RX ADMIN — AMPICILLIN SODIUM AND SULBACTAM SODIUM 200 GRAM(S): 250; 125 INJECTION, POWDER, FOR SUSPENSION INTRAMUSCULAR; INTRAVENOUS at 23:55

## 2022-01-10 RX ADMIN — LORATADINE 10 MILLIGRAM(S): 10 TABLET ORAL at 11:49

## 2022-01-10 RX ADMIN — Medication 650 MILLIGRAM(S): at 14:18

## 2022-01-10 RX ADMIN — OXYMETAZOLINE HYDROCHLORIDE 1 SPRAY(S): 0.5 SPRAY NASAL at 06:19

## 2022-01-10 RX ADMIN — AMPICILLIN SODIUM AND SULBACTAM SODIUM 200 GRAM(S): 250; 125 INJECTION, POWDER, FOR SUSPENSION INTRAMUSCULAR; INTRAVENOUS at 06:18

## 2022-01-10 RX ADMIN — Medication 5 MILLIGRAM(S): at 11:59

## 2022-01-10 RX ADMIN — ELTROMBOPAG OLAMINE 150 MILLIGRAM(S): 50 TABLET, FILM COATED ORAL at 11:50

## 2022-01-10 RX ADMIN — Medication 15 MILLILITER(S): at 23:19

## 2022-01-10 RX ADMIN — OXYCODONE HYDROCHLORIDE 5 MILLIGRAM(S): 5 TABLET ORAL at 12:55

## 2022-01-10 RX ADMIN — AMPICILLIN SODIUM AND SULBACTAM SODIUM 200 GRAM(S): 250; 125 INJECTION, POWDER, FOR SUSPENSION INTRAMUSCULAR; INTRAVENOUS at 19:03

## 2022-01-10 RX ADMIN — Medication 400 MILLIGRAM(S): at 15:51

## 2022-01-10 RX ADMIN — Medication 400 MILLIGRAM(S): at 22:03

## 2022-01-10 RX ADMIN — Medication 50 MILLIGRAM(S): at 12:02

## 2022-01-10 RX ADMIN — Medication 25 MILLIGRAM(S): at 11:50

## 2022-01-10 RX ADMIN — Medication 15 MILLILITER(S): at 12:00

## 2022-01-10 RX ADMIN — Medication 50 MILLIEQUIVALENT(S): at 14:55

## 2022-01-10 RX ADMIN — FAMOTIDINE 20 MILLIGRAM(S): 10 INJECTION INTRAVENOUS at 11:49

## 2022-01-10 RX ADMIN — CYCLOSPORINE 400 MILLIGRAM(S): 100 CAPSULE ORAL at 17:41

## 2022-01-10 RX ADMIN — FLUCONAZOLE 200 MILLIGRAM(S): 150 TABLET ORAL at 11:49

## 2022-01-10 RX ADMIN — Medication 400 MILLIGRAM(S): at 06:18

## 2022-01-10 RX ADMIN — Medication 1 SPRAY(S): at 17:38

## 2022-01-10 RX ADMIN — CYCLOSPORINE 350 MILLIGRAM(S): 100 CAPSULE ORAL at 06:19

## 2022-01-10 RX ADMIN — CHLORHEXIDINE GLUCONATE 1 APPLICATION(S): 213 SOLUTION TOPICAL at 07:24

## 2022-01-10 RX ADMIN — SODIUM CHLORIDE 50 MILLILITER(S): 9 INJECTION INTRAMUSCULAR; INTRAVENOUS; SUBCUTANEOUS at 23:55

## 2022-01-10 RX ADMIN — Medication 650 MILLIGRAM(S): at 11:50

## 2022-01-10 RX ADMIN — PANTOPRAZOLE SODIUM 40 MILLIGRAM(S): 20 TABLET, DELAYED RELEASE ORAL at 06:19

## 2022-01-10 RX ADMIN — Medication 15 MILLILITER(S): at 06:18

## 2022-01-10 RX ADMIN — OXYCODONE HYDROCHLORIDE 5 MILLIGRAM(S): 5 TABLET ORAL at 11:59

## 2022-01-10 RX ADMIN — Medication 50 MILLIEQUIVALENT(S): at 17:37

## 2022-01-10 RX ADMIN — Medication 100 MILLIGRAM(S): at 11:49

## 2022-01-10 NOTE — PROGRESS NOTE ADULT - ASSESSMENT
Mr. Olivera is a 37 yo M with Aplastic Anemia diagnosed  April 2020, s/p equine ATG, Cyclosporine, and Promacta. Bone marrow biopsy was done on 5/2021 revealed normal morphology and cellularity, eventually Promacta and Cyclosporine was stopped in 5/2021. Then noted his platelets dropping in September 2021.   Now admitted with neutropenic fever found to have pancytopenia secondary to disease condition.  Bone marrow biopsy was repeated on 12/2021 showed decreased megakaryocytes c/w relapsed disease. Rabbit ATG/ prednisone  started on 12/29/21; ATG held starting on 12/30 due to reaction and resumed on 1/3/22. Hospital course complicated by ATG reaction, and neutropenic fever. Pt has pancytopenia secondary to chemotherapy and or disease   Mr. Olivera is a 35 yo M with Aplastic Anemia diagnosed  April 2020, s/p equine ATG, Cyclosporine, and Promacta. Bone marrow biopsy was done on 5/2021 revealed normal morphology and cellularity, eventually Promacta and Cyclosporine was stopped in 5/2021. Then noted his platelets dropping in September 2021.   Now admitted with neutropenic fever found to have pancytopenia secondary to disease condition.  Bone marrow biopsy was repeated on 12/2021 showed decreased megakaryocytes c/w relapsed disease. Rabbit ATG/ prednisone  started on 12/29/21; ATG held starting on 12/30 due to reaction and resumed on 1/3/22. Hospital course complicated by ATG reaction, and neutropenic fever. Pt has pancytopenia secondary to chemotherapy and or disease condition.

## 2022-01-10 NOTE — PROGRESS NOTE ADULT - PROBLEM SELECTOR PLAN 5
1/8- Tenderness + at the sinus, will check CT sinus with contrat.  1/8- Flonase and Claritin.  1/9- CT Sinus with contrast shows pansinusitis-   Continue ocean spray TID  Started Unasyn for pansinusitis   Start  Afrin BID sprays every 10-12hours as needed for 4 days  -If symptoms persist repeat CT scan or order an MRI  -ENT will follow 1/8- Tenderness + at the sinus, will check CT sinus with contrat.  1/8- Flonase and Claritin.  1/9- CT Sinus with contrast shows pansinusitis-   Continue ocean spray TID  Started Unasyn for pansinusitis   1/10- D/adriana Afrin on 1/10 as patient developed worsening sinus pain/discomfort.  -If symptoms persist repeat CT scan or order an MRI  -ENT will follow

## 2022-01-10 NOTE — PROGRESS NOTE ADULT - SUBJECTIVE AND OBJECTIVE BOX
Diagnosis:    Protocol/Chemo Regimen:    Day:     Pt endorsed:    Review of Systems:     Pain scale:     Diet:     Allergies    No Known Allergies    Intolerances        ANTIMICROBIALS  acyclovir   Oral Tab/Cap 400 milliGRAM(s) Oral every 8 hours  ampicillin/sulbactam  IVPB      ampicillin/sulbactam  IVPB 3 Gram(s) IV Intermittent every 6 hours  atovaquone  Suspension 1500 milliGRAM(s) Oral daily  fluconAZOLE   Tablet 200 milliGRAM(s) Oral daily      HEME/ONC MEDICATIONS  eltrombopag 150 milliGRAM(s) Oral daily      STANDING MEDICATIONS  Biotene Dry Mouth Oral Rinse 15 milliLiter(s) Swish and Spit four times a day  chlorhexidine 2% Cloths 1 Application(s) Topical <User Schedule>  cycloSPORINE  (SandIMMUNE) 350 milliGRAM(s) Oral every 12 hours  famotidine    Tablet 20 milliGRAM(s) Oral daily  FIRST- Mouthwash  BLM 10 milliLiter(s) Swish and Spit four times a day  fluticasone propionate 50 MICROgram(s)/spray Nasal Spray 1 Spray(s) Both Nostrils two times a day  lidocaine 2% Viscous 2 milliLiter(s) Oral four times a day  loratadine 10 milliGRAM(s) Oral daily  methylPREDNISolone sodium succinate IVPB 500 milliGRAM(s) IV Intermittent once  oxymetazoline 0.05% Nasal Spray 1 Spray(s) Both Nostrils every 12 hours  pantoprazole    Tablet 40 milliGRAM(s) Oral before breakfast  phytonadione   Solution 5 milliGRAM(s) Oral daily  predniSONE   Tablet 100 milliGRAM(s) Oral daily  sodium chloride 0.65% Nasal 1 Spray(s) Both Nostrils four times a day  sodium chloride 0.9%. 1000 milliLiter(s) IV Continuous <Continuous>      PRN MEDICATIONS  acetaminophen     Tablet .. 650 milliGRAM(s) Oral every 6 hours PRN  acetaminophen   IVPB .. 1000 milliGRAM(s) IV Intermittent once PRN  aluminum hydroxide/magnesium hydroxide/simethicone Suspension 30 milliLiter(s) Oral every 4 hours PRN  calcium carbonate    500 mG (Tums) Chewable 1 Tablet(s) Chew every 4 hours PRN  diphenhydrAMINE 25 milliGRAM(s) Oral every 12 hours PRN  diphenhydrAMINE Injectable 25 milliGRAM(s) IV Push once PRN  EPINEPHrine     1 mG/mL Injectable 0.3 milliGRAM(s) IntraMuscular once PRN  hydrocortisone sodium succinate Injectable 50 milliGRAM(s) IV Push every 12 hours PRN  metoclopramide Injectable 10 milliGRAM(s) IV Push every 6 hours PRN  ondansetron Injectable 8 milliGRAM(s) IV Push every 8 hours PRN        Vital Signs Last 24 Hrs  T(C): 36.9 (10 Dilan 2022 05:08), Max: 37.1 (09 Jan 2022 13:27)  T(F): 98.4 (10 Dilan 2022 05:08), Max: 98.8 (09 Jan 2022 13:27)  HR: 73 (10 Dilan 2022 05:08) (73 - 98)  BP: 103/63 (10 Dilan 2022 05:08) (103/63 - 125/80)  BP(mean): --  RR: 18 (10 Dilan 2022 05:08) (18 - 18)  SpO2: 96% (10 Dilan 2022 05:08) (95% - 97%)    PHYSICAL EXAM  General: NAD  HEENT: PERRLA, EOMOI, clear oropharynx, anicteric sclera, pink conjunctiva  Neck: supple  CV: (+) S1/S2 RRR  Lungs: clear to auscultation, no wheezes or rales  Abdomen: soft, non-tender, non-distended (+) BS  Ext: no clubbing, cyanosis or edema  Skin: no rashes and no petechiae  Neuro: alert and oriented X 3, no focal deficits  Central Line:     RECENT CULTURES:        LABS:                        7.5    0.36  )-----------( 23       ( 09 Jan 2022 08:05 )             20.4         Mean Cell Volume : 80.6 fl  Mean Cell Hemoglobin : 29.6 pg  Mean Cell Hemoglobin Concentration : 36.8 gm/dL  Auto Neutrophil # : x  Auto Lymphocyte # : x  Auto Monocyte # : x  Auto Eosinophil # : x  Auto Basophil # : x  Auto Neutrophil % : x  Auto Lymphocyte % : x  Auto Monocyte % : x  Auto Eosinophil % : x  Auto Basophil % : x      01-09    136  |  102  |  22  ----------------------------<  98  3.5   |  21<L>  |  0.80    Ca    9.1      09 Jan 2022 08:05  Phos  3.1     01-09  Mg     1.8     01-09    TPro  6.5  /  Alb  3.8  /  TBili  1.3<H>  /  DBili  x   /  AST  8<L>  /  ALT  36  /  AlkPhos  62  01-09      Mg 1.8  Phos 3.1      PT/INR - ( 09 Jan 2022 08:05 )   PT: 13.5 sec;   INR: 1.13 ratio         PTT - ( 09 Jan 2022 08:05 )  PTT:25.0 sec      Uric Acid 2.2        RADIOLOGY & ADDITIONAL STUDIES:         Diagnosis: Relapsed aplastic anemia    Protocol/Chemo Regimen:  Rabbit ATG/Cyclosporine/prednisone/Promacta     Day: D15 CSA / Rabbit ATG day 9  (D2 suspended due to reaction; resumed on 1/3)     Pt endorsed: +Sinus pain after using Afrin    Review of Systems: denies nausea, vomiting, diarrhea, chest pain, sob    Pain scale: 0                                           Diet: regular    Allergies: No Known Allergies     ANTIMICROBIALS  acyclovir   Oral Tab/Cap 400 milliGRAM(s) Oral every 8 hours  ampicillin/sulbactam  IVPB      ampicillin/sulbactam  IVPB 3 Gram(s) IV Intermittent every 6 hours  atovaquone  Suspension 1500 milliGRAM(s) Oral daily  fluconAZOLE   Tablet 200 milliGRAM(s) Oral daily    HEME/ONC MEDICATIONS  eltrombopag 150 milliGRAM(s) Oral daily    STANDING MEDICATIONS  Biotene Dry Mouth Oral Rinse 15 milliLiter(s) Swish and Spit four times a day  chlorhexidine 2% Cloths 1 Application(s) Topical <User Schedule>  cycloSPORINE  (SandIMMUNE) 350 milliGRAM(s) Oral every 12 hours  famotidine    Tablet 20 milliGRAM(s) Oral daily  FIRST- Mouthwash  BLM 10 milliLiter(s) Swish and Spit four times a day  fluticasone propionate 50 MICROgram(s)/spray Nasal Spray 1 Spray(s) Both Nostrils two times a day  lidocaine 2% Viscous 2 milliLiter(s) Oral four times a day  loratadine 10 milliGRAM(s) Oral daily  methylPREDNISolone sodium succinate IVPB 500 milliGRAM(s) IV Intermittent once  oxymetazoline 0.05% Nasal Spray 1 Spray(s) Both Nostrils every 12 hours  pantoprazole    Tablet 40 milliGRAM(s) Oral before breakfast  phytonadione   Solution 5 milliGRAM(s) Oral daily  predniSONE   Tablet 100 milliGRAM(s) Oral daily  sodium chloride 0.65% Nasal 1 Spray(s) Both Nostrils four times a day  sodium chloride 0.9%. 1000 milliLiter(s) IV Continuous <Continuous>    PRN MEDICATIONS  acetaminophen     Tablet .. 650 milliGRAM(s) Oral every 6 hours PRN  acetaminophen   IVPB .. 1000 milliGRAM(s) IV Intermittent once PRN  aluminum hydroxide/magnesium hydroxide/simethicone Suspension 30 milliLiter(s) Oral every 4 hours PRN  calcium carbonate    500 mG (Tums) Chewable 1 Tablet(s) Chew every 4 hours PRN  diphenhydrAMINE 25 milliGRAM(s) Oral every 12 hours PRN  diphenhydrAMINE Injectable 25 milliGRAM(s) IV Push once PRN  EPINEPHrine     1 mG/mL Injectable 0.3 milliGRAM(s) IntraMuscular once PRN  hydrocortisone sodium succinate Injectable 50 milliGRAM(s) IV Push every 12 hours PRN  metoclopramide Injectable 10 milliGRAM(s) IV Push every 6 hours PRN  ondansetron Injectable 8 milliGRAM(s) IV Push every 8 hours PRN    Vital Signs Last 24 Hrs  T(C): 36.9 (10 Dilan 2022 05:08), Max: 37.1 (09 Jan 2022 13:27)  T(F): 98.4 (10 Dilan 2022 05:08), Max: 98.8 (09 Jan 2022 13:27)  HR: 73 (10 Dilan 2022 05:08) (73 - 98)  BP: 103/63 (10 Dilan 2022 05:08) (103/63 - 125/80)  BP(mean): --  RR: 18 (10 Dilan 2022 05:08) (18 - 18)  SpO2: 96% (10 Dilan 2022 05:08) (95% - 97%)    PHYSICAL EXAM  General: adult in NAD  HEENT: clear oropharynx, +tenderness at the ethmoidal, frontal sinus  CV: normal S1/S2 RRR  Lungs: clear to auscultation, no wheezes, no rales  Abdomen: soft non-tender non-distended  Ext: no edema  Skin: no rash  Neuro: alert and oriented X 4  Central Line: RUE PICC  c/d/i    RECENT CULTURES:  Culture - Blood (12.29.21 @ 21:56)    Specimen Source: .Blood Blood-Peripheral    Culture Results:   No Growth Final    Culture - Urine (12.30.21 @ 00:57)    Specimen Source: Clean Catch Clean Catch (Midstream)    Culture Results:   No growth    LABS:                        7.2    0.40  )-----------( 7        ( 10 Dilan 2022 07:41 )             19.7     Mean Cell Volume : 83.1 fl  Mean Cell Hemoglobin : 30.4 pg  Mean Cell Hemoglobin Concentration : 36.5 gm/dL  Auto Neutrophil # : x  Auto Lymphocyte # : x  Auto Monocyte # : x  Auto Eosinophil # : x  Auto Basophil # : x  Auto Neutrophil % : x  Auto Lymphocyte % : x  Auto Monocyte % : x  Auto Eosinophil % : x  Auto Basophil % : x    01-10    139  |  104  |  25<H>  ----------------------------<  114<H>  3.3<L>   |  21<L>  |  0.76    Ca    9.2      10 Dilan 2022 07:39  Phos  3.3     01-10  Mg     1.9     01-10    TPro  6.5  /  Alb  3.8  /  TBili  1.0  /  DBili  x   /  AST  8<L>  /  ALT  36  /  AlkPhos  66  01-10  Mg 1.9  Phos 3.3  PT/INR - ( 10 Dilan 2022 07:41 )   PT: 12.7 sec;   INR: 1.06 ratio    PTT - ( 10 Dilan 2022 07:41 )  PTT:25.1 sec    Uric Acid 2.2    RADIOLOGY & ADDITIONAL STUDIES:  CT Sinuses w/ IV Cont (01.09.22 @ 11:32) >   Pansinusitis.

## 2022-01-10 NOTE — PROGRESS NOTE ADULT - PROBLEM SELECTOR PLAN 2
Neutropenic, afebrile   continue diflucan, Acyclovir.  CT CAP  to look for source of infection- No CT evidence for source of infection in the chest, abdomen or pelvis.  12/24- Staph aureus PCR detected, will start Vanco 1 gm Q 12 hrs stopped 12/26. CT maxillo facial did not reveal any abscess.   1/3 Started Mepron for ppx  1/5 if fever recurs will discontinue Diflucan and start voriconazole as per ID.  1/9- CT sinus with contrast shows pansinusitis, ENT consulted started on Unasyn 3gm Q 6 hrs, d/adriana Cefepime and Zithromax.  ID following Neutropenic, afebrile   continue diflucan, Acyclovir, Mepron.  CT CAP  to look for source of infection- No CT evidence for source of infection in the chest, abdomen or pelvis.  12/24- Staph aureus PCR detected, will start Vanco 1 gm Q 12 hrs stopped 12/26. CT maxillo facial did not reveal any abscess.   1/5 if fever recurs will discontinue Diflucan and start voriconazole as per ID.  1/9- CT sinus with contrast shows pansinusitis, ENT consulted started on Unasyn 3gm Q 6 hrs, d/adriana Cefepime and Zithromax.  1/10- If febrile start Voriconazole.  ID following

## 2022-01-10 NOTE — PROGRESS NOTE ADULT - ATTENDING COMMENTS
36M with Aplastic Anemia diagnosed April 2020.  Underwent treatment with equine ATG, Cyclosporine, and Promacta.  Went into remission (BM bx 5/2021 with normal morphology and cellularity).  Promacta stopped May 2021.  Cyclosporin stopped August 2021.  By September, platelets started to drop.  Now with pancytopenia and neutropenic fever.  No obvious focus of infection. On cefepime, diflucan, acyclovir and mepron.  On cyclosporine 300 mg BID (started on 12/26/21), first level on 12/29/21 --197 increased dose to 325mg bid, repeat level  133, increasing CsA to 350 q12hr.   Today is cyclosporine day 14, day 8 rATG  Completed rATG and continues with prednisone and Promacta.  CT sinuses with pansinutitis, appreciate ENT recs   continue tylenol PRN  follow CsA trough, Mg, LFTs 36M with Aplastic Anemia diagnosed April 2020.  Underwent treatment with equine ATG, Cyclosporine, and Promacta.  Went into remission (BM bx 5/2021 with normal morphology and cellularity).  Promacta stopped May 2021.  Cyclosporin stopped August 2021.  By September, platelets started to drop.  Now with pancytopenia and neutropenic fever.  No obvious focus of infection. On cefepime, diflucan, acyclovir and mepron.  On cyclosporine 300 mg BID (started on 12/26/21), first level on 12/29/21 --197 increased dose to 325mg bid, repeat level  133, increasing CsA to 350 q12hr.   Today is cyclosporine day 14, day 8 rATG  Completed rATG and continues with prednisone and Promacta.  CT sinuses with pansinutitis, appreciate ENT recs, on unasyn, monitor for improvement  continue tylenol PRN  follow CsA trough,, Mg, LFTs

## 2022-01-10 NOTE — PROGRESS NOTE ADULT - PROBLEM SELECTOR PLAN 1
Relapsed Aplastic Anemia   s/p hATG, CSA, and promacta with complete remission in 2020, BM bx on 11/9/20 revealed hypocellular marrow.  BMbx 12/2021 showing erythroid predominance, myeloid and erythroid maturation, and markedly decreased megakaryocytes c/w relapse  12/23-   transferred  to [facility] for rATG treatment  Monitor CBC with diff/lytes, transfuse and or replete PRN, Monitor weight, I and O, mouth care  12/26- Started Cyclosporine 300 mg PO Q 12 hrs , 12/27 test dose - no reaction noted  12/28 PICC placed  12/29 Started  Rabbit ATG/prednisone-Reaction to rATG- HELD on 12/30 due to reaction ; Continue promacta  1/3 Resumed  rabbit ATG/prednisone  1/6 completed rATG. PLTs x 1 abg. counts at clint.  1/7  CSA level 133, increased Cyclosporine dose to 350mg BID. re-check levels qTues/Fri  1/8- F/u Cyclosporine level on 1/9 before am dose- 87. Relapsed Aplastic Anemia   s/p hATG, CSA, and promacta with complete remission in 2020, BM bx on 11/9/20 revealed hypocellular marrow.  BMbx 12/2021 showing erythroid predominance, myeloid and erythroid maturation, and markedly decreased megakaryocytes c/w relapse  12/23-   transferred  to [facility] for rATG treatment  Monitor CBC with diff/lytes, transfuse and or replete PRN, Monitor weight, I and O, mouth care  12/26- Started Cyclosporine 300 mg PO Q 12 hrs , 12/27 test dose - no reaction noted  12/28 PICC placed  12/29 Started  Rabbit ATG/prednisone-Reaction to rATG- HELD on 12/30 due to reaction ; Continue promacta  1/3 Resumed  rabbit ATG/prednisone  1/6 completed rATG. PLTs x 1 abg. counts at clint.  1/7  CSA level 133, increased Cyclosporine dose to 350mg BID. re-check levels qTues/Fri 1/8- F/u Cyclosporine level on 1/9 before am dose- 87.  1/10- CSP level- 77, increased CSP to 400 mg BID with f/u level on Wed 1/12 and 1/14.  1/10- Prednisone taper start on 1/12.

## 2022-01-11 LAB
ALBUMIN SERPL ELPH-MCNC: 3.8 G/DL — SIGNIFICANT CHANGE UP (ref 3.3–5)
ALP SERPL-CCNC: 62 U/L — SIGNIFICANT CHANGE UP (ref 40–120)
ALT FLD-CCNC: 79 U/L — HIGH (ref 10–45)
ANION GAP SERPL CALC-SCNC: 12 MMOL/L — SIGNIFICANT CHANGE UP (ref 5–17)
APTT BLD: 25.6 SEC — LOW (ref 27.5–35.5)
AST SERPL-CCNC: 37 U/L — SIGNIFICANT CHANGE UP (ref 10–40)
BILIRUB SERPL-MCNC: 1.1 MG/DL — SIGNIFICANT CHANGE UP (ref 0.2–1.2)
BUN SERPL-MCNC: 18 MG/DL — SIGNIFICANT CHANGE UP (ref 7–23)
CALCIUM SERPL-MCNC: 9.1 MG/DL — SIGNIFICANT CHANGE UP (ref 8.4–10.5)
CHLORIDE SERPL-SCNC: 102 MMOL/L — SIGNIFICANT CHANGE UP (ref 96–108)
CO2 SERPL-SCNC: 24 MMOL/L — SIGNIFICANT CHANGE UP (ref 22–31)
CREAT SERPL-MCNC: 0.7 MG/DL — SIGNIFICANT CHANGE UP (ref 0.5–1.3)
FIBRINOGEN PPP-MCNC: 892 MG/DL — HIGH (ref 290–520)
GLUCOSE SERPL-MCNC: 117 MG/DL — HIGH (ref 70–99)
HCT VFR BLD CALC: 18.6 % — CRITICAL LOW (ref 39–50)
HGB BLD-MCNC: 6.8 G/DL — CRITICAL LOW (ref 13–17)
INR BLD: 1.18 RATIO — HIGH (ref 0.88–1.16)
LDH SERPL L TO P-CCNC: 179 U/L — SIGNIFICANT CHANGE UP (ref 50–242)
MAGNESIUM SERPL-MCNC: 1.8 MG/DL — SIGNIFICANT CHANGE UP (ref 1.6–2.6)
MCHC RBC-ENTMCNC: 30.4 PG — SIGNIFICANT CHANGE UP (ref 27–34)
MCHC RBC-ENTMCNC: 36.6 GM/DL — HIGH (ref 32–36)
MCV RBC AUTO: 83 FL — SIGNIFICANT CHANGE UP (ref 80–100)
NRBC # BLD: 2 /100 WBCS — HIGH (ref 0–0)
PHOSPHATE SERPL-MCNC: 3.4 MG/DL — SIGNIFICANT CHANGE UP (ref 2.5–4.5)
PLATELET # BLD AUTO: 24 K/UL — LOW (ref 150–400)
POTASSIUM SERPL-MCNC: 3.7 MMOL/L — SIGNIFICANT CHANGE UP (ref 3.5–5.3)
POTASSIUM SERPL-SCNC: 3.7 MMOL/L — SIGNIFICANT CHANGE UP (ref 3.5–5.3)
PROT SERPL-MCNC: 6.6 G/DL — SIGNIFICANT CHANGE UP (ref 6–8.3)
PROTHROM AB SERPL-ACNC: 14.1 SEC — HIGH (ref 10.6–13.6)
RBC # BLD: 2.24 M/UL — LOW (ref 4.2–5.8)
RBC # FLD: 12.2 % — SIGNIFICANT CHANGE UP (ref 10.3–14.5)
SODIUM SERPL-SCNC: 138 MMOL/L — SIGNIFICANT CHANGE UP (ref 135–145)
URATE SERPL-MCNC: 2.1 MG/DL — LOW (ref 3.4–8.8)
WBC # BLD: 0.45 K/UL — CRITICAL LOW (ref 3.8–10.5)
WBC # FLD AUTO: 0.45 K/UL — CRITICAL LOW (ref 3.8–10.5)

## 2022-01-11 PROCEDURE — 99233 SBSQ HOSP IP/OBS HIGH 50: CPT | Mod: GC

## 2022-01-11 RX ORDER — POTASSIUM CHLORIDE 20 MEQ
20 PACKET (EA) ORAL ONCE
Refills: 0 | Status: COMPLETED | OUTPATIENT
Start: 2022-01-11 | End: 2022-01-11

## 2022-01-11 RX ORDER — MAGNESIUM SULFATE 500 MG/ML
1 VIAL (ML) INJECTION ONCE
Refills: 0 | Status: COMPLETED | OUTPATIENT
Start: 2022-01-11 | End: 2022-01-11

## 2022-01-11 RX ADMIN — AMPICILLIN SODIUM AND SULBACTAM SODIUM 200 GRAM(S): 250; 125 INJECTION, POWDER, FOR SUSPENSION INTRAMUSCULAR; INTRAVENOUS at 23:55

## 2022-01-11 RX ADMIN — ELTROMBOPAG OLAMINE 150 MILLIGRAM(S): 50 TABLET, FILM COATED ORAL at 12:50

## 2022-01-11 RX ADMIN — AMPICILLIN SODIUM AND SULBACTAM SODIUM 200 GRAM(S): 250; 125 INJECTION, POWDER, FOR SUSPENSION INTRAMUSCULAR; INTRAVENOUS at 17:14

## 2022-01-11 RX ADMIN — Medication 50 MILLIGRAM(S): at 09:10

## 2022-01-11 RX ADMIN — FAMOTIDINE 20 MILLIGRAM(S): 10 INJECTION INTRAVENOUS at 12:51

## 2022-01-11 RX ADMIN — SODIUM CHLORIDE 50 MILLILITER(S): 9 INJECTION INTRAMUSCULAR; INTRAVENOUS; SUBCUTANEOUS at 21:25

## 2022-01-11 RX ADMIN — AMPICILLIN SODIUM AND SULBACTAM SODIUM 200 GRAM(S): 250; 125 INJECTION, POWDER, FOR SUSPENSION INTRAMUSCULAR; INTRAVENOUS at 12:48

## 2022-01-11 RX ADMIN — Medication 25 MILLIGRAM(S): at 09:10

## 2022-01-11 RX ADMIN — ATOVAQUONE 1500 MILLIGRAM(S): 750 SUSPENSION ORAL at 12:50

## 2022-01-11 RX ADMIN — Medication 15 MILLILITER(S): at 23:55

## 2022-01-11 RX ADMIN — Medication 15 MILLILITER(S): at 05:52

## 2022-01-11 RX ADMIN — Medication 15 MILLILITER(S): at 12:49

## 2022-01-11 RX ADMIN — Medication 100 GRAM(S): at 12:48

## 2022-01-11 RX ADMIN — CHLORHEXIDINE GLUCONATE 1 APPLICATION(S): 213 SOLUTION TOPICAL at 09:11

## 2022-01-11 RX ADMIN — LORATADINE 10 MILLIGRAM(S): 10 TABLET ORAL at 12:51

## 2022-01-11 RX ADMIN — Medication 1 SPRAY(S): at 17:14

## 2022-01-11 RX ADMIN — CYCLOSPORINE 400 MILLIGRAM(S): 100 CAPSULE ORAL at 05:51

## 2022-01-11 RX ADMIN — Medication 100 MILLIGRAM(S): at 13:03

## 2022-01-11 RX ADMIN — Medication 1 SPRAY(S): at 05:51

## 2022-01-11 RX ADMIN — Medication 1 SPRAY(S): at 23:55

## 2022-01-11 RX ADMIN — Medication 400 MILLIGRAM(S): at 21:26

## 2022-01-11 RX ADMIN — AMPICILLIN SODIUM AND SULBACTAM SODIUM 200 GRAM(S): 250; 125 INJECTION, POWDER, FOR SUSPENSION INTRAMUSCULAR; INTRAVENOUS at 05:53

## 2022-01-11 RX ADMIN — FLUCONAZOLE 200 MILLIGRAM(S): 150 TABLET ORAL at 12:51

## 2022-01-11 RX ADMIN — Medication 1 SPRAY(S): at 12:49

## 2022-01-11 RX ADMIN — Medication 20 MILLIEQUIVALENT(S): at 12:48

## 2022-01-11 RX ADMIN — PANTOPRAZOLE SODIUM 40 MILLIGRAM(S): 20 TABLET, DELAYED RELEASE ORAL at 05:52

## 2022-01-11 RX ADMIN — Medication 15 MILLILITER(S): at 17:14

## 2022-01-11 RX ADMIN — Medication 650 MILLIGRAM(S): at 09:10

## 2022-01-11 RX ADMIN — CYCLOSPORINE 400 MILLIGRAM(S): 100 CAPSULE ORAL at 18:14

## 2022-01-11 RX ADMIN — Medication 400 MILLIGRAM(S): at 14:15

## 2022-01-11 RX ADMIN — Medication 400 MILLIGRAM(S): at 05:53

## 2022-01-11 NOTE — PROGRESS NOTE ADULT - PROBLEM SELECTOR PLAN 2
Neutropenic, afebrile   continue diflucan, Acyclovir, Mepron.  CT CAP  to look for source of infection- No CT evidence for source of infection in the chest, abdomen or pelvis.  12/24- Staph aureus PCR detected, will start Vanco 1 gm Q 12 hrs stopped 12/26. CT maxillo facial did not reveal any abscess.   1/5 if fever recurs will discontinue Diflucan and start voriconazole as per ID.  1/9- CT sinus with contrast shows pansinusitis, ENT consulted started on Unasyn 3gm Q 6 hrs, d/adriana Cefepime and Zithromax.  1/10- If febrile start Voriconazole.  ID following Neutropenic, afebrile   continue diflucan, Acyclovir, Mepron.  CT CAP  to look for source of infection- No CT evidence for source of infection in the chest, abdomen or pelvis.  12/24- Staph aureus PCR detected, will start Vanco 1 gm Q 12 hrs stopped 12/26. CT maxillo facial did not reveal any abscess.   1/5 if fever recurs will discontinue Diflucan and start voriconazole as per ID.  1/9- CT sinus with contrast shows pansinusitis, ENT consulted started on Unasyn 3gm Q 6 hrs, d/adriana Cefepime and Zithromax.  1/10- If febrile, switch Diflucan to  Voriconazole.  ID following  Pancx CXR if fever

## 2022-01-11 NOTE — PROGRESS NOTE ADULT - SUBJECTIVE AND OBJECTIVE BOX
ENT ISSUE/POD: Headache/sinus pain and pressure    HPI: 37 y/o Male w/ pmhx of aplastic anemia dx via bone biopsy at [facility] 4/2020 following with Dr. Goldberg and currently in treatment with cyclosporine who presents with  nose bleeding and fever of Tmax 103. Pt had rigors through out the night on 12/21. Pt took Tylenol and said he was fine afterwards. Pt had intermittent nose bleeds through out the day that is currently controlled. ENT was consulted for chronic head aches and worsening sinus pain and pressure. The patient states his symptoms have been going on for about 36 hours. He has pain and pressure over his frontal sinus, behind his eyes and temples. He states today he started to have some numbness tingling above his eye brows. His sinus pain is associated with headaches. He states Tylenol is not helping him but oxycodone helped with his HA. He denies any facial weakness, loss of sensation, throat pain, ear pain, tinnitus, dizziness, LOC, CP, SOB, fever, chills, n/v/d.     CT significant for frontal sinusitis. Chronic mucosal thickening throughout other sinus cavities. Pt since started on afrin x3 days followed by flonase and saline, and abx. Pt today says he is feeling somewhat relieved since starting treatment two days ago      PAST MEDICAL & SURGICAL HISTORY:  Asthma, stable    Redundant prepuce and phimosis    H/O circumcision      Allergies    No Known Allergies    Intolerances      MEDICATIONS  (STANDING):  acyclovir   Oral Tab/Cap 400 milliGRAM(s) Oral every 8 hours  ampicillin/sulbactam  IVPB      ampicillin/sulbactam  IVPB 3 Gram(s) IV Intermittent every 6 hours  atovaquone  Suspension 1500 milliGRAM(s) Oral daily  Biotene Dry Mouth Oral Rinse 15 milliLiter(s) Swish and Spit four times a day  chlorhexidine 2% Cloths 1 Application(s) Topical <User Schedule>  cycloSPORINE  (SandIMMUNE) 400 milliGRAM(s) Oral every 12 hours  eltrombopag 150 milliGRAM(s) Oral daily  famotidine    Tablet 20 milliGRAM(s) Oral daily  FIRST- Mouthwash  BLM 10 milliLiter(s) Swish and Spit four times a day  fluconAZOLE   Tablet 200 milliGRAM(s) Oral daily  fluticasone propionate 50 MICROgram(s)/spray Nasal Spray 1 Spray(s) Both Nostrils two times a day  lidocaine 2% Viscous 2 milliLiter(s) Oral four times a day  loratadine 10 milliGRAM(s) Oral daily  methylPREDNISolone sodium succinate IVPB 500 milliGRAM(s) IV Intermittent once  pantoprazole    Tablet 40 milliGRAM(s) Oral before breakfast  predniSONE   Tablet 100 milliGRAM(s) Oral daily  sodium chloride 0.65% Nasal 1 Spray(s) Both Nostrils four times a day  sodium chloride 0.9%. 1000 milliLiter(s) (50 mL/Hr) IV Continuous <Continuous>    MEDICATIONS  (PRN):  acetaminophen     Tablet .. 650 milliGRAM(s) Oral every 6 hours PRN Temp greater or equal to 38C (100.4F), Mild Pain (1 - 3)  acetaminophen   IVPB .. 1000 milliGRAM(s) IV Intermittent once PRN Temp greater or equal to 38C (100.4F)  aluminum hydroxide/magnesium hydroxide/simethicone Suspension 30 milliLiter(s) Oral every 4 hours PRN Dyspepsia  calcium carbonate    500 mG (Tums) Chewable 1 Tablet(s) Chew every 4 hours PRN Heartburn  diphenhydrAMINE 25 milliGRAM(s) Oral every 12 hours PRN Rash and/or Itching  diphenhydrAMINE Injectable 25 milliGRAM(s) IV Push once PRN Rash  EPINEPHrine     1 mG/mL Injectable 0.3 milliGRAM(s) IntraMuscular once PRN anaphylactic reaction  hydrocortisone sodium succinate Injectable 50 milliGRAM(s) IV Push every 12 hours PRN pre transfusion  metoclopramide Injectable 10 milliGRAM(s) IV Push every 6 hours PRN for nausea and or vomiting  ondansetron Injectable 8 milliGRAM(s) IV Push every 8 hours PRN Nausea and/or Vomiting  oxyCODONE    IR 5 milliGRAM(s) Oral every 6 hours PRN Moderate Pain (4 - 6)          ROS:   ENT: all negative except as noted in HPI   Pulm: denies SOB, cough, hemoptysis  Neuro: denies numbness/tingling, loss of sensation  Endo: denies heat/cold intolerance, excessive sweating      Vital Signs Last 24 Hrs  T(C): 37 (11 Jan 2022 05:01), Max: 37.1 (10 Dilan 2022 13:00)  T(F): 98.6 (11 Jan 2022 05:01), Max: 98.8 (10 Dilan 2022 13:00)  HR: 88 (11 Jan 2022 05:01) (88 - 100)  BP: 126/71 (11 Jan 2022 05:01) (103/66 - 143/92)  BP(mean): --  RR: 18 (11 Jan 2022 05:01) (18 - 18)  SpO2: 96% (11 Jan 2022 05:01) (96% - 99%)                          6.8    0.45  )-----------( 24       ( 11 Jan 2022 08:02 )             18.6    01-11    138  |  102  |  18  ----------------------------<  117<H>  3.7   |  24  |  0.70    Ca    9.1      11 Jan 2022 08:00  Phos  3.4     01-11  Mg     1.8     01-11    TPro  6.6  /  Alb  3.8  /  TBili  1.1  /  DBili  x   /  AST  37  /  ALT  79<H>  /  AlkPhos  62  01-11   PT/INR - ( 11 Jan 2022 08:03 )   PT: 14.1 sec;   INR: 1.18 ratio         PTT - ( 11 Jan 2022 08:03 )  PTT:25.6 sec    PHYSICAL EXAM:  Gen: NAD  Skin: No rashes, bruises, or lesions  Head: Normocephalic, Atraumatic  Face: no edema, erythema, or fluctuance. Parotid glands soft without mass  Eyes: no scleral injection  Nose: Nares bilaterally patent, no discharge  Mouth: No Stridor / Drooling / Trismus.  Mucosa moist, tongue/uvula midline, oropharynx clear  Neck: Flat, supple, no lymphadenopathy, trachea midline, no masses  Lymphatic: No lymphadenopathy  Resp: breathing easily, no stridor  Neuro: facial nerve intact, no facial droop

## 2022-01-11 NOTE — PROGRESS NOTE ADULT - SUBJECTIVE AND OBJECTIVE BOX
Diagnosis: Relapsed aplastic anemia    Protocol/Chemo Regimen:  Rabbit ATG/Cyclosporine/prednisone/Promacta     Day:  Rabbit ATG day  10 (D2 suspended due to reaction; resumed on 1/3)     Pt endorsed: +Sinus pain after using Afrin    Review of Systems: denies nausea, vomiting, diarrhea, chest pain, sob    Pain scale: 0                                           Diet: regular    Allergies: No Known Allergies      ANTIMICROBIALS  acyclovir   Oral Tab/Cap 400 milliGRAM(s) Oral every 8 hours  ampicillin/sulbactam  IVPB      ampicillin/sulbactam  IVPB 3 Gram(s) IV Intermittent every 6 hours  atovaquone  Suspension 1500 milliGRAM(s) Oral daily  fluconAZOLE   Tablet 200 milliGRAM(s) Oral daily      HEME/ONC MEDICATIONS  eltrombopag 150 milliGRAM(s) Oral daily      STANDING MEDICATIONS  Biotene Dry Mouth Oral Rinse 15 milliLiter(s) Swish and Spit four times a day  chlorhexidine 2% Cloths 1 Application(s) Topical <User Schedule>  cycloSPORINE  (SandIMMUNE) 400 milliGRAM(s) Oral every 12 hours  famotidine    Tablet 20 milliGRAM(s) Oral daily  FIRST- Mouthwash  BLM 10 milliLiter(s) Swish and Spit four times a day  fluticasone propionate 50 MICROgram(s)/spray Nasal Spray 1 Spray(s) Both Nostrils two times a day  lidocaine 2% Viscous 2 milliLiter(s) Oral four times a day  loratadine 10 milliGRAM(s) Oral daily  methylPREDNISolone sodium succinate IVPB 500 milliGRAM(s) IV Intermittent once  pantoprazole    Tablet 40 milliGRAM(s) Oral before breakfast  predniSONE   Tablet 100 milliGRAM(s) Oral daily  sodium chloride 0.65% Nasal 1 Spray(s) Both Nostrils four times a day  sodium chloride 0.9%. 1000 milliLiter(s) IV Continuous <Continuous>      PRN MEDICATIONS  acetaminophen     Tablet .. 650 milliGRAM(s) Oral every 6 hours PRN  acetaminophen   IVPB .. 1000 milliGRAM(s) IV Intermittent once PRN  aluminum hydroxide/magnesium hydroxide/simethicone Suspension 30 milliLiter(s) Oral every 4 hours PRN  calcium carbonate    500 mG (Tums) Chewable 1 Tablet(s) Chew every 4 hours PRN  diphenhydrAMINE 25 milliGRAM(s) Oral every 12 hours PRN  diphenhydrAMINE Injectable 25 milliGRAM(s) IV Push once PRN  EPINEPHrine     1 mG/mL Injectable 0.3 milliGRAM(s) IntraMuscular once PRN  hydrocortisone sodium succinate Injectable 50 milliGRAM(s) IV Push every 12 hours PRN  metoclopramide Injectable 10 milliGRAM(s) IV Push every 6 hours PRN  ondansetron Injectable 8 milliGRAM(s) IV Push every 8 hours PRN  oxyCODONE    IR 5 milliGRAM(s) Oral every 6 hours PRN      Vital Signs Last 24 Hrs  T(C): 37 (11 Jan 2022 05:01), Max: 37.1 (10 Dilan 2022 13:00)  T(F): 98.6 (11 Jan 2022 05:01), Max: 98.8 (10 Dilan 2022 13:00)  HR: 88 (11 Jan 2022 05:01) (88 - 100)  BP: 126/71 (11 Jan 2022 05:01) (103/66 - 143/92)  BP(mean): --  RR: 18 (11 Jan 2022 05:01) (18 - 18)  SpO2: 96% (11 Jan 2022 05:01) (96% - 99%)        PHYSICAL EXAM  General: adult in NAD  HEENT: clear oropharynx, +tenderness at the ethmoidal, frontal sinus  CV: normal S1/S2 RRR  Lungs: clear to auscultation, no wheezes, no rales  Abdomen: soft non-tender non-distended  Ext: no edema  Skin: no rash  Neuro: alert and oriented X 4  Central Line: RUE PICC  c/d/i        LABS:                        7.2    0.40  )-----------( 7        ( 10 Dilan 2022 07:41 )             19.7         Mean Cell Volume : 83.1 fl  Mean Cell Hemoglobin : 30.4 pg  Mean Cell Hemoglobin Concentration : 36.5 gm/dL  Auto Neutrophil # : x  Auto Lymphocyte # : x  Auto Monocyte # : x  Auto Eosinophil # : x  Auto Basophil # : x  Auto Neutrophil % : x  Auto Lymphocyte % : x  Auto Monocyte % : x  Auto Eosinophil % : x  Auto Basophil % : x      01-10    139  |  104  |  25<H>  ----------------------------<  114<H>  3.3<L>   |  21<L>  |  0.76    Ca    9.2      10 Dilan 2022 07:39  Phos  3.3     01-10  Mg     1.9     01-10    TPro  6.5  /  Alb  3.8  /  TBili  1.0  /  DBili  x   /  AST  8<L>  /  ALT  36  /  AlkPhos  66  01-10          PT/INR - ( 10 Dilan 2022 07:41 )   PT: 12.7 sec;   INR: 1.06 ratio         PTT - ( 10 Dilan 2022 07:41 )  PTT:25.1 sec        RECENT CULTURES:      RADIOLOGY & ADDITIONAL STUDIES:    CT Sinuses w/ IV Cont (01.09.22 @ 11:32) >   Pansinusitis.         Diagnosis: Relapsed aplastic anemia    Protocol/Chemo Regimen:  Rabbit ATG/Cyclosporine/prednisone/Promacta     Day:  Rabbit ATG day  10 (D2 suspended due to reaction; resumed on 1/3)     Pt endorsed: improved sinus pressure around eyebrows     Review of Systems: Patient denied nausea, vomiting, odynophagia, chest pain, cough, dyspnea, abdominal pain, constipation, diarrhea, rash, fatigue, headache      Pain scale: 0                                           Diet: regular Enlive QD    Allergies: No Known Allergies      ANTIMICROBIALS  acyclovir   Oral Tab/Cap 400 milliGRAM(s) Oral every 8 hours  ampicillin/sulbactam  IVPB      ampicillin/sulbactam  IVPB 3 Gram(s) IV Intermittent every 6 hours  atovaquone  Suspension 1500 milliGRAM(s) Oral daily  fluconAZOLE   Tablet 200 milliGRAM(s) Oral daily      HEME/ONC MEDICATIONS  eltrombopag 150 milliGRAM(s) Oral daily      STANDING MEDICATIONS  Biotene Dry Mouth Oral Rinse 15 milliLiter(s) Swish and Spit four times a day  chlorhexidine 2% Cloths 1 Application(s) Topical <User Schedule>  cycloSPORINE  (SandIMMUNE) 400 milliGRAM(s) Oral every 12 hours  famotidine    Tablet 20 milliGRAM(s) Oral daily  FIRST- Mouthwash  BLM 10 milliLiter(s) Swish and Spit four times a day  fluticasone propionate 50 MICROgram(s)/spray Nasal Spray 1 Spray(s) Both Nostrils two times a day  lidocaine 2% Viscous 2 milliLiter(s) Oral four times a day  loratadine 10 milliGRAM(s) Oral daily  methylPREDNISolone sodium succinate IVPB 500 milliGRAM(s) IV Intermittent once  pantoprazole    Tablet 40 milliGRAM(s) Oral before breakfast  predniSONE   Tablet 100 milliGRAM(s) Oral daily  sodium chloride 0.65% Nasal 1 Spray(s) Both Nostrils four times a day  sodium chloride 0.9%. 1000 milliLiter(s) IV Continuous <Continuous>      PRN MEDICATIONS  acetaminophen     Tablet .. 650 milliGRAM(s) Oral every 6 hours PRN  acetaminophen   IVPB .. 1000 milliGRAM(s) IV Intermittent once PRN  aluminum hydroxide/magnesium hydroxide/simethicone Suspension 30 milliLiter(s) Oral every 4 hours PRN  calcium carbonate    500 mG (Tums) Chewable 1 Tablet(s) Chew every 4 hours PRN  diphenhydrAMINE 25 milliGRAM(s) Oral every 12 hours PRN  diphenhydrAMINE Injectable 25 milliGRAM(s) IV Push once PRN  EPINEPHrine     1 mG/mL Injectable 0.3 milliGRAM(s) IntraMuscular once PRN  hydrocortisone sodium succinate Injectable 50 milliGRAM(s) IV Push every 12 hours PRN  metoclopramide Injectable 10 milliGRAM(s) IV Push every 6 hours PRN  ondansetron Injectable 8 milliGRAM(s) IV Push every 8 hours PRN  oxyCODONE    IR 5 milliGRAM(s) Oral every 6 hours PRN      Vital Signs Last 24 Hrs  T(C): 37 (11 Jan 2022 05:01), Max: 37.1 (10 Dilan 2022 13:00)  T(F): 98.6 (11 Jan 2022 05:01), Max: 98.8 (10 Dilan 2022 13:00)  HR: 88 (11 Jan 2022 05:01) (88 - 100)  BP: 126/71 (11 Jan 2022 05:01) (103/66 - 143/92)  BP(mean): --  RR: 18 (11 Jan 2022 05:01) (18 - 18)  SpO2: 96% (11 Jan 2022 05:01) (96% - 99%)      PHYSICAL EXAM  General: adult in NAD  HEENT: clear oropharynx, +tenderness at the ethmoidal, frontal sinus  CV: normal S1/S2 RRR  Lungs: clear to auscultation, no wheezes, no rales  Abdomen: soft non-tender non-distended  Ext: no edema  Skin: no rash  Neuro: alert and oriented X 4  Central Line: RUE PICC  c/d/i      LABS:                          6.8    0.45  )-----------( 24       ( 11 Jan 2022 08:02 )             18.6         Mean Cell Volume : 83.0 fl  Mean Cell Hemoglobin : 30.4 pg  Mean Cell Hemoglobin Concentration : 36.6 gm/dL  Auto Neutrophil # : x  Auto Lymphocyte # : x  Auto Monocyte # : x  Auto Eosinophil # : x  Auto Basophil # : x  Auto Neutrophil % : x  Auto Lymphocyte % : x  Auto Monocyte % : x  Auto Eosinophil % : x  Auto Basophil % : x      01-11    138  |  102  |  18  ----------------------------<  117<H>  3.7   |  24  |  0.70    Ca    9.1      11 Jan 2022 08:00  Phos  3.4     01-11  Mg     1.8     01-11    TPro  6.6  /  Alb  3.8  /  TBili  1.1  /  DBili  x   /  AST  37  /  ALT  79<H>  /  AlkPhos  62  01-11      PT/INR - ( 11 Jan 2022 08:03 )   PT: 14.1 sec;   INR: 1.18 ratio         PTT - ( 11 Jan 2022 08:03 )  PTT:25.6 sec      Uric Acid 2.1          RECENT CULTURES:    COVID-19 PCR (01.09.22 @ 07:58)    COVID-19 PCR: NotDetec      RADIOLOGY & ADDITIONAL STUDIES:    CT Sinuses w/ IV Cont (01.09.22 @ 11:32) >   Pansinusitis.

## 2022-01-11 NOTE — PROGRESS NOTE ADULT - PROBLEM SELECTOR PLAN 3
12/24-  CT maxillofacial with contrast and CT neck with contrast to r/o abscess. (-)  12/26- pain control., First mouth wash, local Lidocaine adm with Q tips. 12/24-  CT maxillofacial with contrast and CT neck with contrast to r/o abscess. (-)  12/26- pain control., First mouth wash, local Lidocaine adm with Q tips.  1/11 resolved, First mouth wash &  local Lidocaine discontinued

## 2022-01-11 NOTE — PROGRESS NOTE ADULT - PROBLEM SELECTOR PLAN 5
1/8- Tenderness + at the sinus, will check CT sinus with contrat.  1/8- Flonase and Claritin.  1/9- CT Sinus with contrast shows pansinusitis-   Continue ocean spray TID  Started Unasyn for pansinusitis   1/10- D/adriana Afrin on 1/10 as patient developed worsening sinus pain/discomfort.  -If symptoms persist repeat CT scan or order an MRI  -ENT will follow 1/8- Tenderness + at the sinus, will check CT sinus with contrat.  1/8- Flonase and Claritin.  1/9- CT Sinus with contrast shows pansinusitis-   Continue ocean spray TID  Started Unasyn for pansinusitis   1/10- D/adriana Afrin on 1/10 as patient developed worsening sinus pain/discomfort.  -If symptoms persist repeat CT scan or order an MRI  -ENT following  1/11 sinus pressure improving

## 2022-01-11 NOTE — PROGRESS NOTE ADULT - PROBLEM SELECTOR PLAN 1
Relapsed Aplastic Anemia   s/p hATG, CSA, and promacta with complete remission in 2020, BM bx on 11/9/20 revealed hypocellular marrow.  BMbx 12/2021 showing erythroid predominance, myeloid and erythroid maturation, and markedly decreased megakaryocytes c/w relapse  12/23-   transferred  to [facility] for rATG treatment  Monitor CBC with diff/lytes, transfuse and or replete PRN, Monitor weight, I and O, mouth care  12/26- Started Cyclosporine 300 mg PO Q 12 hrs , 12/27 test dose - no reaction noted  12/28 PICC placed  12/29 Started  Rabbit ATG/prednisone-Reaction to rATG- HELD on 12/30 due to reaction ; Continue promacta  1/3 Resumed  rabbit ATG/prednisone  1/6 completed rATG. PLTs x 1 abg. counts at clint.  1/7  CSA level 133, increased Cyclosporine dose to 350mg BID. re-check levels qTues/Fri 1/8- F/u Cyclosporine level on 1/9 before am dose- 87.  1/10- CSP level- 77, increased CSP to 400 mg BID with f/u level on Wed 1/12 and 1/14.  1/10- Prednisone taper start on 1/12. Relapsed Aplastic Anemia   s/p hATG, CSA, and promacta with complete remission in 2020, BM bx on 11/9/20 revealed hypocellular marrow.  BMbx 12/2021 showing erythroid predominance, myeloid and erythroid maturation, and markedly decreased megakaryocytes c/w relapse  12/23-   transferred  to [facility] for rATG treatment  Monitor CBC with diff/lytes, transfuse and or replete PRN, Monitor weight, I and O, mouth care  12/26- Started Cyclosporine 300 mg PO Q 12 hrs , 12/27 test dose - no reaction noted  12/28 PICC placed  12/29 Started  Rabbit ATG/prednisone-Reaction to rATG- HELD on 12/30 due to reaction ; Continue promacta  1/3 Resumed  rabbit ATG/prednisone  1/6 completed rATG. PLTs x 1 abg. counts at clint.  1/7  CSA level 133, increased Cyclosporine dose to 350mg BID. re-check levels qTues/Fri 1/8- F/u Cyclosporine level on 1/9 before am dose- 87.  1/10- CSP level- 77, increased CSP to 400 mg BID with f/u level on Wed 1/12 and 1/14.  1/10- Prednisone taper start on 1/12.  Anemia: PRBC x1  Hypokalemia: KCL 20 meq po x1  Hypomagnesemia: Mg 1 g IVPB x1

## 2022-01-11 NOTE — PROGRESS NOTE ADULT - ASSESSMENT
37 yo male being tx'd for aplastic anemia with acute on chronic sinusitis. Currently on unasyn and nasal sprays with some relief since starting treatment 48 hours ago

## 2022-01-11 NOTE — PROGRESS NOTE ADULT - ATTENDING COMMENTS
36M with Aplastic Anemia diagnosed April 2020.  Underwent treatment with equine ATG, Cyclosporine, and Promacta.  Went into remission (BM bx 5/2021 with normal morphology and cellularity).  Promacta stopped May 2021.  Cyclosporin stopped August 2021.  By September, platelets started to drop.  Now with pancytopenia and neutropenic fever.  No obvious focus of infection. On cefepime, diflucan, acyclovir and mepron.  On cyclosporine 300 mg BID (started on 12/26/21), first level on 12/29/21 --197 increased dose to 325mg bid, repeat level  133, increasing CsA to 350 q12hr.   Today is cyclosporine day 14, day 8 rATG  Completed rATG and continues with prednisone and Promacta.  CT sinuses with pansinutitis, appreciate ENT recs, on unasyn, monitor for improvement  continue tylenol PRN  follow CsA trough,, Mg, LFTs 36M with Aplastic Anemia diagnosed April 2020.  Underwent treatment with equine ATG, Cyclosporine, and Promacta.  Went into remission (BM bx 5/2021 with normal morphology and cellularity).  Promacta stopped May 2021.  Cyclosporin stopped August 2021.  By September, platelets started to drop.  Now with pancytopenia and neutropenic fever.  No obvious focus of infection. On cefepime, diflucan, acyclovir and mepron.  On cyclosporine 300 mg BID (started on 12/26/21), first level on 12/29/21 --197 increased dose to 325mg bid, repeat level  133, increasing CsA to 350 q12hr.   Today is cyclosporine day 16, day 10 rATG  Completed rATG and continues with prednisone and Promacta.  CT sinuses with pansinutitis, appreciate ENT recs, on unasyn, monitor for improvement  continue tylenol PRN  follow CsA trough, Mg, LFTs

## 2022-01-11 NOTE — PROGRESS NOTE ADULT - PROBLEM SELECTOR PLAN 6
PTT prolonged, start Vitamin K 5 mg PO x 3 days (1/8,9,10). PTT prolonged, s/p Vitamin K 5 mg PO x 3 days (1/8,9,10).

## 2022-01-11 NOTE — PROGRESS NOTE ADULT - PROBLEM SELECTOR PLAN 1
-Complete afrin no more than 2 sprays x3 days  -Saline spray   -Start flonase once afrin completed  -Complete course of abx  -pain control

## 2022-01-11 NOTE — PROGRESS NOTE ADULT - ASSESSMENT
Mr. Olivera is a 35 yo M with Aplastic Anemia diagnosed  April 2020, s/p equine ATG, Cyclosporine, and Promacta. Bone marrow biopsy was done on 5/2021 revealed normal morphology and cellularity, eventually Promacta and Cyclosporine was stopped in 5/2021. Then noted his platelets dropping in September 2021.   Now admitted with neutropenic fever found to have pancytopenia secondary to disease condition.  Bone marrow biopsy was repeated on 12/2021 showed decreased megakaryocytes c/w relapsed disease. Rabbit ATG/ prednisone  started on 12/29/21; ATG held starting on 12/30 due to reaction and resumed on 1/3/22. Hospital course complicated by ATG reaction, and neutropenic fever. Pt has pancytopenia secondary to chemotherapy and or disease condition.

## 2022-01-12 LAB
ALBUMIN SERPL ELPH-MCNC: 3.8 G/DL — SIGNIFICANT CHANGE UP (ref 3.3–5)
ALP SERPL-CCNC: 64 U/L — SIGNIFICANT CHANGE UP (ref 40–120)
ALT FLD-CCNC: 164 U/L — HIGH (ref 10–45)
ANION GAP SERPL CALC-SCNC: 11 MMOL/L — SIGNIFICANT CHANGE UP (ref 5–17)
AST SERPL-CCNC: 58 U/L — HIGH (ref 10–40)
BASOPHILS # BLD AUTO: 0 K/UL — SIGNIFICANT CHANGE UP (ref 0–0.2)
BASOPHILS NFR BLD AUTO: 0 % — SIGNIFICANT CHANGE UP (ref 0–2)
BILIRUB SERPL-MCNC: 1.5 MG/DL — HIGH (ref 0.2–1.2)
BUN SERPL-MCNC: 18 MG/DL — SIGNIFICANT CHANGE UP (ref 7–23)
CALCIUM SERPL-MCNC: 9 MG/DL — SIGNIFICANT CHANGE UP (ref 8.4–10.5)
CHLORIDE SERPL-SCNC: 103 MMOL/L — SIGNIFICANT CHANGE UP (ref 96–108)
CO2 SERPL-SCNC: 25 MMOL/L — SIGNIFICANT CHANGE UP (ref 22–31)
CREAT SERPL-MCNC: 0.76 MG/DL — SIGNIFICANT CHANGE UP (ref 0.5–1.3)
CYCLOSPORINE SER-MCNC: 143 NG/ML — LOW (ref 150–400)
EOSINOPHIL # BLD AUTO: 0 K/UL — SIGNIFICANT CHANGE UP (ref 0–0.5)
EOSINOPHIL NFR BLD AUTO: 0 % — SIGNIFICANT CHANGE UP (ref 0–6)
GIANT PLATELETS BLD QL SMEAR: PRESENT — SIGNIFICANT CHANGE UP
GLUCOSE SERPL-MCNC: 119 MG/DL — HIGH (ref 70–99)
HCT VFR BLD CALC: 20.5 % — CRITICAL LOW (ref 39–50)
HGB BLD-MCNC: 7.5 G/DL — LOW (ref 13–17)
LDH SERPL L TO P-CCNC: 203 U/L — SIGNIFICANT CHANGE UP (ref 50–242)
LYMPHOCYTES # BLD AUTO: 0.07 K/UL — LOW (ref 1–3.3)
LYMPHOCYTES # BLD AUTO: 12.3 % — LOW (ref 13–44)
MAGNESIUM SERPL-MCNC: 1.9 MG/DL — SIGNIFICANT CHANGE UP (ref 1.6–2.6)
MANUAL SMEAR VERIFICATION: SIGNIFICANT CHANGE UP
MCHC RBC-ENTMCNC: 30.1 PG — SIGNIFICANT CHANGE UP (ref 27–34)
MCHC RBC-ENTMCNC: 36.6 GM/DL — HIGH (ref 32–36)
MCV RBC AUTO: 82.3 FL — SIGNIFICANT CHANGE UP (ref 80–100)
MONOCYTES # BLD AUTO: 0.09 K/UL — SIGNIFICANT CHANGE UP (ref 0–0.9)
MONOCYTES NFR BLD AUTO: 17.5 % — HIGH (ref 2–14)
MYELOCYTES NFR BLD: 1.8 % — HIGH (ref 0–0)
NEUTROPHILS # BLD AUTO: 0.35 K/UL — LOW (ref 1.8–7.4)
NEUTROPHILS NFR BLD AUTO: 64.9 % — SIGNIFICANT CHANGE UP (ref 43–77)
NEUTS BAND # BLD: 1.7 % — SIGNIFICANT CHANGE UP (ref 0–8)
PHOSPHATE SERPL-MCNC: 3.6 MG/DL — SIGNIFICANT CHANGE UP (ref 2.5–4.5)
PLAT MORPH BLD: NORMAL — SIGNIFICANT CHANGE UP
PLATELET # BLD AUTO: 15 K/UL — CRITICAL LOW (ref 150–400)
POTASSIUM SERPL-MCNC: 4 MMOL/L — SIGNIFICANT CHANGE UP (ref 3.5–5.3)
POTASSIUM SERPL-SCNC: 4 MMOL/L — SIGNIFICANT CHANGE UP (ref 3.5–5.3)
PROT SERPL-MCNC: 6.5 G/DL — SIGNIFICANT CHANGE UP (ref 6–8.3)
RBC # BLD: 2.49 M/UL — LOW (ref 4.2–5.8)
RBC # FLD: 12.6 % — SIGNIFICANT CHANGE UP (ref 10.3–14.5)
RBC BLD AUTO: SIGNIFICANT CHANGE UP
SODIUM SERPL-SCNC: 139 MMOL/L — SIGNIFICANT CHANGE UP (ref 135–145)
URATE SERPL-MCNC: 1.9 MG/DL — LOW (ref 3.4–8.8)
VARIANT LYMPHS # BLD: 1.8 % — SIGNIFICANT CHANGE UP (ref 0–6)
WBC # BLD: 0.53 K/UL — CRITICAL LOW (ref 3.8–10.5)
WBC # FLD AUTO: 0.53 K/UL — CRITICAL LOW (ref 3.8–10.5)

## 2022-01-12 PROCEDURE — 99233 SBSQ HOSP IP/OBS HIGH 50: CPT | Mod: GC

## 2022-01-12 RX ORDER — CYCLOSPORINE 100 MG/1
450 CAPSULE ORAL EVERY 12 HOURS
Refills: 0 | Status: DISCONTINUED | OUTPATIENT
Start: 2022-01-12 | End: 2022-01-14

## 2022-01-12 RX ADMIN — CYCLOSPORINE 450 MILLIGRAM(S): 100 CAPSULE ORAL at 18:15

## 2022-01-12 RX ADMIN — Medication 15 MILLILITER(S): at 06:36

## 2022-01-12 RX ADMIN — AMPICILLIN SODIUM AND SULBACTAM SODIUM 200 GRAM(S): 250; 125 INJECTION, POWDER, FOR SUSPENSION INTRAMUSCULAR; INTRAVENOUS at 17:56

## 2022-01-12 RX ADMIN — Medication 15 MILLILITER(S): at 17:58

## 2022-01-12 RX ADMIN — AMPICILLIN SODIUM AND SULBACTAM SODIUM 200 GRAM(S): 250; 125 INJECTION, POWDER, FOR SUSPENSION INTRAMUSCULAR; INTRAVENOUS at 11:27

## 2022-01-12 RX ADMIN — Medication 400 MILLIGRAM(S): at 13:45

## 2022-01-12 RX ADMIN — LORATADINE 10 MILLIGRAM(S): 10 TABLET ORAL at 11:25

## 2022-01-12 RX ADMIN — CHLORHEXIDINE GLUCONATE 1 APPLICATION(S): 213 SOLUTION TOPICAL at 08:30

## 2022-01-12 RX ADMIN — Medication 80 MILLIGRAM(S): at 06:44

## 2022-01-12 RX ADMIN — Medication 1 SPRAY(S): at 06:44

## 2022-01-12 RX ADMIN — Medication 1 SPRAY(S): at 11:26

## 2022-01-12 RX ADMIN — FAMOTIDINE 20 MILLIGRAM(S): 10 INJECTION INTRAVENOUS at 11:24

## 2022-01-12 RX ADMIN — ATOVAQUONE 1500 MILLIGRAM(S): 750 SUSPENSION ORAL at 11:24

## 2022-01-12 RX ADMIN — Medication 1 SPRAY(S): at 17:56

## 2022-01-12 RX ADMIN — AMPICILLIN SODIUM AND SULBACTAM SODIUM 200 GRAM(S): 250; 125 INJECTION, POWDER, FOR SUSPENSION INTRAMUSCULAR; INTRAVENOUS at 06:36

## 2022-01-12 RX ADMIN — CYCLOSPORINE 400 MILLIGRAM(S): 100 CAPSULE ORAL at 06:41

## 2022-01-12 RX ADMIN — Medication 400 MILLIGRAM(S): at 06:41

## 2022-01-12 RX ADMIN — ELTROMBOPAG OLAMINE 150 MILLIGRAM(S): 50 TABLET, FILM COATED ORAL at 15:00

## 2022-01-12 RX ADMIN — Medication 1 SPRAY(S): at 06:39

## 2022-01-12 RX ADMIN — Medication 400 MILLIGRAM(S): at 21:28

## 2022-01-12 RX ADMIN — Medication 15 MILLILITER(S): at 11:24

## 2022-01-12 RX ADMIN — AMPICILLIN SODIUM AND SULBACTAM SODIUM 200 GRAM(S): 250; 125 INJECTION, POWDER, FOR SUSPENSION INTRAMUSCULAR; INTRAVENOUS at 23:47

## 2022-01-12 RX ADMIN — FLUCONAZOLE 200 MILLIGRAM(S): 150 TABLET ORAL at 11:25

## 2022-01-12 NOTE — PROGRESS NOTE ADULT - PROBLEM SELECTOR PLAN 1
Relapsed Aplastic Anemia   s/p hATG, CSA, and promacta with complete remission in 2020, BM bx on 11/9/20 revealed hypocellular marrow.  BMbx 12/2021 showing erythroid predominance, myeloid and erythroid maturation, and markedly decreased megakaryocytes c/w relapse  12/23-   transferred  to [facility] for rATG treatment  Monitor CBC with diff/lytes, transfuse and or replete PRN, Monitor weight, I and O, mouth care  12/26- Started Cyclosporine 300 mg PO Q 12 hrs , 12/27 test dose - no reaction noted  12/28 PICC placed  12/29 Started  Rabbit ATG/prednisone-Reaction to rATG- HELD on 12/30 due to reaction ; Continue promacta  1/3 Resumed  rabbit ATG/prednisone  1/6 completed rATG. PLTs x 1 abg. counts at clint.  1/7  CSA level 133, increased Cyclosporine dose to 350mg BID. re-check levels qTues/Fri 1/8- F/u Cyclosporine level on 1/9 before am dose- 87.  1/10- CSP level- 77, increased CSP to 400 mg BID with f/u level on Wed 1/12 and 1/14.  1/10- Prednisone taper start on 1/12.  Anemia: PRBC x1  Hypokalemia: KCL 20 meq po x1  Hypomagnesemia: Mg 1 g IVPB x1 Relapsed Aplastic Anemia   s/p hATG, CSA, and promacta with complete remission in 2020, BM bx on 11/9/20 revealed hypocellular marrow.  BMbx 12/2021 showing erythroid predominance, myeloid and erythroid maturation, and markedly decreased megakaryocytes c/w relapse  12/23-   transferred  to [facility] for rATG treatment  Monitor CBC with diff/lytes, transfuse and or replete PRN, Monitor weight, I and O, mouth care  12/26- Started Cyclosporine 300 mg PO Q 12 hrs , 12/27 test dose - no reaction noted  12/28 PICC placed  12/29 Started  Rabbit ATG/prednisone-Reaction to rATG- HELD on 12/30 due to reaction ; Continue promacta  1/3 Resumed  rabbit ATG/prednisone  1/6 completed rATG. PLTs x 1 abg. counts at clint.  1/7  CSA level 133, increased Cyclosporine dose to 350mg BID. re-check levels qTues/Fri 1/8- F/u Cyclosporine level on 1/9 before am dose- 87.  1/10- CSP level- 77, increased CSP to 400 mg BID with f/u level on Wed 1/12 and 1/14.  1/10- Prednisone taper start on 1/12. Relapsed Aplastic Anemia   s/p hATG, CSA, and promacta with complete remission in 2020, BM bx on 11/9/20 revealed hypocellular marrow.  BMbx 12/2021 showing erythroid predominance, myeloid and erythroid maturation, and markedly decreased megakaryocytes c/w relapse  12/23-   transferred  to [facility] for rATG treatment  Monitor CBC with diff/lytes, transfuse and or replete PRN, Monitor weight, I and O, mouth care  12/26- Started Cyclosporine 300 mg PO Q 12 hrs , 12/27 test dose - no reaction noted  12/28 PICC placed  12/29 Started  Rabbit ATG/prednisone-Reaction to rATG- HELD on 12/30 due to reaction ; Continue promacta  1/3 Resumed  rabbit ATG/prednisone  1/6 completed rATG. PLTs x 1 abg. counts at clint.  1/7  CSA level 133, increased Cyclosporine dose to 350mg BID. re-check levels qTues/Fri  1/8- F/u Cyclosporine level on 1/9 before am dose- 87.  1/10- CSP level- 77, increased CSP to 400 mg BID with f/u level on Wed 1/12 and 1/14.  1/10- Prednisone taper start on 1/12.  1/12 monitor transaminitis and hyperbilirubinemia

## 2022-01-12 NOTE — PROGRESS NOTE ADULT - ATTENDING COMMENTS
36M with Aplastic Anemia diagnosed April 2020.  Underwent treatment with equine ATG, Cyclosporine, and Promacta.  Went into remission (BM bx 5/2021 with normal morphology and cellularity).  Promacta stopped May 2021.  Cyclosporin stopped August 2021.  By September, platelets started to drop.  Now with pancytopenia and neutropenic fever.  No obvious focus of infection. On cefepime, diflucan, acyclovir and mepron.  On cyclosporine 300 mg BID (started on 12/26/21), first level on 12/29/21 --197 increased dose to 325mg bid, repeat level  133, increasing CsA to 350 q12hr.   Today is cyclosporine day 16, day 10 rATG  Completed rATG and continues with prednisone and Promacta.  CT sinuses with pansinutitis, appreciate ENT recs, on unasyn, monitor for improvement  continue tylenol PRN  follow CsA trough, Mg, LFTs 36M with Aplastic Anemia diagnosed April 2020.  Underwent treatment with equine ATG, Cyclosporine, and Promacta.  Went into remission (BM bx 5/2021 with normal morphology and cellularity).  Promacta stopped May 2021.  Cyclosporin stopped August 2021.  By September, platelets started to drop.  Now with pancytopenia and neutropenic fever.  No obvious focus of infection. On cefepime, diflucan, acyclovir and mepron.  On cyclosporine 300 mg BID (started on 12/26/21), first level on 12/29/21 --197 increased dose to 325mg bid, repeat level  133, increasing CsA to 350 q12hr.   Today is cyclosporine day 17, day 11 rATG  Completed rATG and continues with prednisone and Promacta.  CT sinuses with pansinutitis, appreciate ENT recs, on unasyn, monitor for improvement  continue tylenol PRN  follow CsA trough, Mg, LFTs

## 2022-01-12 NOTE — PROGRESS NOTE ADULT - PROBLEM SELECTOR PLAN 3
12/24-  CT maxillofacial with contrast and CT neck with contrast to r/o abscess. (-)  12/26- pain control., First mouth wash, local Lidocaine adm with Q tips.  1/11 resolved, First mouth wash &  local Lidocaine discontinued

## 2022-01-12 NOTE — PROGRESS NOTE ADULT - SUBJECTIVE AND OBJECTIVE BOX
Diagnosis: Relapsed aplastic anemia    Protocol/Chemo Regimen:  Rabbit ATG/Cyclosporine/prednisone/Promacta     Day:  Rabbit ATG day  11 (D2 suspended due to reaction; resumed on 1/3)     Pt endorsed: improved sinus pressure around eyebrows     Review of Systems: Patient denied nausea, vomiting, odynophagia, chest pain, cough, dyspnea, abdominal pain, constipation, diarrhea, rash, fatigue, headache      Pain scale: 0                                           Diet: regular Enlive QD    Allergies: No Known Allergies      ANTIMICROBIALS  acyclovir   Oral Tab/Cap 400 milliGRAM(s) Oral every 8 hours  ampicillin/sulbactam  IVPB      ampicillin/sulbactam  IVPB 3 Gram(s) IV Intermittent every 6 hours  atovaquone  Suspension 1500 milliGRAM(s) Oral daily  fluconAZOLE   Tablet 200 milliGRAM(s) Oral daily      HEME/ONC MEDICATIONS  eltrombopag 150 milliGRAM(s) Oral daily      STANDING MEDICATIONS  Biotene Dry Mouth Oral Rinse 15 milliLiter(s) Swish and Spit four times a day  chlorhexidine 2% Cloths 1 Application(s) Topical <User Schedule>  cycloSPORINE  (SandIMMUNE) 400 milliGRAM(s) Oral every 12 hours  famotidine    Tablet 20 milliGRAM(s) Oral daily  fluticasone propionate 50 MICROgram(s)/spray Nasal Spray 1 Spray(s) Both Nostrils two times a day  loratadine 10 milliGRAM(s) Oral daily  methylPREDNISolone sodium succinate IVPB 500 milliGRAM(s) IV Intermittent once  predniSONE   Tablet 80 milliGRAM(s) Oral daily  sodium chloride 0.65% Nasal 1 Spray(s) Both Nostrils four times a day  sodium chloride 0.9%. 1000 milliLiter(s) IV Continuous <Continuous>      PRN MEDICATIONS  acetaminophen     Tablet .. 650 milliGRAM(s) Oral every 6 hours PRN  acetaminophen   IVPB .. 1000 milliGRAM(s) IV Intermittent once PRN  aluminum hydroxide/magnesium hydroxide/simethicone Suspension 30 milliLiter(s) Oral every 4 hours PRN  calcium carbonate    500 mG (Tums) Chewable 1 Tablet(s) Chew every 4 hours PRN  diphenhydrAMINE 25 milliGRAM(s) Oral every 12 hours PRN  diphenhydrAMINE Injectable 25 milliGRAM(s) IV Push once PRN  EPINEPHrine     1 mG/mL Injectable 0.3 milliGRAM(s) IntraMuscular once PRN  hydrocortisone sodium succinate Injectable 50 milliGRAM(s) IV Push every 12 hours PRN  metoclopramide Injectable 10 milliGRAM(s) IV Push every 6 hours PRN  ondansetron Injectable 8 milliGRAM(s) IV Push every 8 hours PRN  oxyCODONE    IR 5 milliGRAM(s) Oral every 6 hours PRN      Vital Signs Last 24 Hrs  T(C): 36.6 (12 Jan 2022 05:03), Max: 37 (11 Jan 2022 10:25)  T(F): 97.9 (12 Jan 2022 05:03), Max: 98.6 (11 Jan 2022 10:25)  HR: 79 (12 Jan 2022 05:03) (76 - 100)  BP: 130/79 (12 Jan 2022 05:03) (130/79 - 156/95)  BP(mean): --  RR: 18 (12 Jan 2022 05:03) (17 - 18)  SpO2: 97% (12 Jan 2022 05:03) (95% - 100%)      PHYSICAL EXAM  General: adult in NAD  HEENT: clear oropharynx, +tenderness at the ethmoidal, frontal sinus  CV: normal S1/S2 RRR  Lungs: clear to auscultation, no wheezes, no rales  Abdomen: soft non-tender non-distended  Ext: no edema  Skin: no rash  Neuro: alert and oriented X 4  Central Line: RUE PICC  c/d/i          LABS:                        6.8    0.45  )-----------( 24       ( 11 Jan 2022 08:02 )             18.6         Mean Cell Volume : 83.0 fl  Mean Cell Hemoglobin : 30.4 pg  Mean Cell Hemoglobin Concentration : 36.6 gm/dL  Auto Neutrophil # : x  Auto Lymphocyte # : x  Auto Monocyte # : x  Auto Eosinophil # : x  Auto Basophil # : x  Auto Neutrophil % : x  Auto Lymphocyte % : x  Auto Monocyte % : x  Auto Eosinophil % : x  Auto Basophil % : x      01-11    138  |  102  |  18  ----------------------------<  117<H>  3.7   |  24  |  0.70    Ca    9.1      11 Jan 2022 08:00  Phos  3.4     01-11  Mg     1.8     01-11    TPro  6.6  /  Alb  3.8  /  TBili  1.1  /  DBili  x   /  AST  37  /  ALT  79<H>  /  AlkPhos  62  01-11      Mg 1.8  Phos 3.4      PT/INR - ( 11 Jan 2022 08:03 )   PT: 14.1 sec;   INR: 1.18 ratio         PTT - ( 11 Jan 2022 08:03 )  PTT:25.6 sec      Uric Acid 2.1        RECENT CULTURES:    COVID-19 PCR (01.09.22 @ 07:58)    COVID-19 PCR: NotDetec      RADIOLOGY & ADDITIONAL STUDIES:    CT Sinuses w/ IV Cont (01.09.22 @ 11:32) >   Pansinusitis.         Diagnosis: Relapsed aplastic anemia    Protocol/Chemo Regimen:  Rabbit ATG/Cyclosporine/prednisone/Promacta     Day:  Rabbit ATG day  11 (D2 suspended due to reaction; resumed on 1/3)     Pt endorsed: improved sinus pressure around eyebrows     Review of Systems: Patient denied nausea, vomiting, odynophagia, chest pain, cough, dyspnea, abdominal pain, constipation, diarrhea, rash, fatigue, headache      Pain scale: 0                                           Diet: regular Enlive QD    Allergies: No Known Allergies      ANTIMICROBIALS  acyclovir   Oral Tab/Cap 400 milliGRAM(s) Oral every 8 hours  ampicillin/sulbactam  IVPB      ampicillin/sulbactam  IVPB 3 Gram(s) IV Intermittent every 6 hours  atovaquone  Suspension 1500 milliGRAM(s) Oral daily  fluconAZOLE   Tablet 200 milliGRAM(s) Oral daily      HEME/ONC MEDICATIONS  eltrombopag 150 milliGRAM(s) Oral daily      STANDING MEDICATIONS  Biotene Dry Mouth Oral Rinse 15 milliLiter(s) Swish and Spit four times a day  chlorhexidine 2% Cloths 1 Application(s) Topical <User Schedule>  cycloSPORINE  (SandIMMUNE) 400 milliGRAM(s) Oral every 12 hours  famotidine    Tablet 20 milliGRAM(s) Oral daily  fluticasone propionate 50 MICROgram(s)/spray Nasal Spray 1 Spray(s) Both Nostrils two times a day  loratadine 10 milliGRAM(s) Oral daily  methylPREDNISolone sodium succinate IVPB 500 milliGRAM(s) IV Intermittent once  predniSONE   Tablet 80 milliGRAM(s) Oral daily  sodium chloride 0.65% Nasal 1 Spray(s) Both Nostrils four times a day  sodium chloride 0.9%. 1000 milliLiter(s) IV Continuous <Continuous>      PRN MEDICATIONS  acetaminophen     Tablet .. 650 milliGRAM(s) Oral every 6 hours PRN  acetaminophen   IVPB .. 1000 milliGRAM(s) IV Intermittent once PRN  aluminum hydroxide/magnesium hydroxide/simethicone Suspension 30 milliLiter(s) Oral every 4 hours PRN  calcium carbonate    500 mG (Tums) Chewable 1 Tablet(s) Chew every 4 hours PRN  diphenhydrAMINE 25 milliGRAM(s) Oral every 12 hours PRN  diphenhydrAMINE Injectable 25 milliGRAM(s) IV Push once PRN  EPINEPHrine     1 mG/mL Injectable 0.3 milliGRAM(s) IntraMuscular once PRN  hydrocortisone sodium succinate Injectable 50 milliGRAM(s) IV Push every 12 hours PRN  metoclopramide Injectable 10 milliGRAM(s) IV Push every 6 hours PRN  ondansetron Injectable 8 milliGRAM(s) IV Push every 8 hours PRN  oxyCODONE    IR 5 milliGRAM(s) Oral every 6 hours PRN      Vital Signs Last 24 Hrs  T(C): 36.6 (12 Jan 2022 05:03), Max: 37 (11 Jan 2022 10:25)  T(F): 97.9 (12 Jan 2022 05:03), Max: 98.6 (11 Jan 2022 10:25)  HR: 79 (12 Jan 2022 05:03) (76 - 100)  BP: 130/79 (12 Jan 2022 05:03) (130/79 - 156/95)  BP(mean): --  RR: 18 (12 Jan 2022 05:03) (17 - 18)  SpO2: 97% (12 Jan 2022 05:03) (95% - 100%)      PHYSICAL EXAM  General: adult in NAD  HEENT: clear oropharynx, +mild tenderness  around eyebrows   CV: normal S1/S2 RRR  Lungs: clear to auscultation, no wheezes, no rales  Abdomen: soft non-tender non-distended  Ext: no edema  Skin: no rash  Neuro: alert and oriented X 4  Central Line: RUE PICC  c/d/i      LABS:                        7.5    0.53  )-----------( 15       ( 12 Jan 2022 08:03 )             20.5         Mean Cell Volume : 82.3 fl  Mean Cell Hemoglobin : 30.1 pg  Mean Cell Hemoglobin Concentration : 36.6 gm/dL  Auto Neutrophil # : 0.35 K/uL  Auto Lymphocyte # : 0.07 K/uL  Auto Monocyte # : 0.09 K/uL  Auto Eosinophil # : 0.00 K/uL  Auto Basophil # : 0.00 K/uL  Auto Neutrophil % : 64.9 %  Auto Lymphocyte % : 12.3 %  Auto Monocyte % : 17.5 %  Auto Eosinophil % : 0.0 %  Auto Basophil % : 0.0 %      01-12    139  |  103  |  18  ----------------------------<  119<H>  4.0   |  25  |  0.76    Ca    9.0      12 Jan 2022 08:03  Phos  3.6     01-12  Mg     1.9     01-12    TPro  6.5  /  Alb  3.8  /  TBili  1.5<H>  /  DBili  x   /  AST  58<H>  /  ALT  164<H>  /  AlkPhos  64  01-12    PT/INR - ( 11 Jan 2022 08:03 )   PT: 14.1 sec;   INR: 1.18 ratio         PTT - ( 11 Jan 2022 08:03 )  PTT:25.6 sec      Uric Acid 1.9      RECENT CULTURES:    COVID-19 PCR (01.09.22 @ 07:58)    COVID-19 PCR: NotDetec      RADIOLOGY & ADDITIONAL STUDIES:    CT Sinuses w/ IV Cont (01.09.22 @ 11:32) >   Pansinusitis.

## 2022-01-12 NOTE — PROGRESS NOTE ADULT - ASSESSMENT
Mr. Olivera is a 37 yo M with Aplastic Anemia diagnosed  April 2020, s/p equine ATG, Cyclosporine, and Promacta. Bone marrow biopsy was done on 5/2021 revealed normal morphology and cellularity, eventually Promacta and Cyclosporine was stopped in 5/2021. Then noted his platelets dropping in September 2021.   Now admitted with neutropenic fever found to have pancytopenia secondary to disease condition.  Bone marrow biopsy was repeated on 12/2021 showed decreased megakaryocytes c/w relapsed disease. Rabbit ATG/ prednisone  started on 12/29/21; ATG held starting on 12/30 due to reaction and resumed on 1/3/22. Hospital course complicated by ATG reaction, and neutropenic fever. Pt has pancytopenia secondary to chemotherapy and or disease condition.

## 2022-01-12 NOTE — PROGRESS NOTE ADULT - PROBLEM SELECTOR PLAN 5
1/8- Tenderness + at the sinus, will check CT sinus with contrat.  1/8- Flonase and Claritin.  1/9- CT Sinus with contrast shows pansinusitis-   Continue ocean spray TID  Started Unasyn for pansinusitis   1/10- D/adriana Afrin on 1/10 as patient developed worsening sinus pain/discomfort.  -If symptoms persist repeat CT scan or order an MRI  -ENT following  1/11 sinus pressure improving

## 2022-01-12 NOTE — PROGRESS NOTE ADULT - PROBLEM SELECTOR PLAN 2
Neutropenic, afebrile   continue diflucan, Acyclovir, Mepron.  CT CAP  to look for source of infection- No CT evidence for source of infection in the chest, abdomen or pelvis.  12/24- Staph aureus PCR detected, will start Vanco 1 gm Q 12 hrs stopped 12/26. CT maxillo facial did not reveal any abscess.   1/5 if fever recurs will discontinue Diflucan and start voriconazole as per ID.  1/9- CT sinus with contrast shows pansinusitis, ENT consulted started on Unasyn 3gm Q 6 hrs, d/adriana Cefepime and Zithromax.  1/10- If febrile, switch Diflucan to  Voriconazole.  ID following  Pancx CXR if fever

## 2022-01-13 LAB
ALBUMIN SERPL ELPH-MCNC: 3.7 G/DL — SIGNIFICANT CHANGE UP (ref 3.3–5)
ALP SERPL-CCNC: 70 U/L — SIGNIFICANT CHANGE UP (ref 40–120)
ALT FLD-CCNC: 248 U/L — HIGH (ref 10–45)
ANION GAP SERPL CALC-SCNC: 13 MMOL/L — SIGNIFICANT CHANGE UP (ref 5–17)
APTT BLD: 22.6 SEC — LOW (ref 27.5–35.5)
AST SERPL-CCNC: 58 U/L — HIGH (ref 10–40)
BASOPHILS # BLD AUTO: 0 K/UL — SIGNIFICANT CHANGE UP (ref 0–0.2)
BASOPHILS NFR BLD AUTO: 0 % — SIGNIFICANT CHANGE UP (ref 0–2)
BILIRUB SERPL-MCNC: 1.2 MG/DL — SIGNIFICANT CHANGE UP (ref 0.2–1.2)
BUN SERPL-MCNC: 19 MG/DL — SIGNIFICANT CHANGE UP (ref 7–23)
CALCIUM SERPL-MCNC: 8.6 MG/DL — SIGNIFICANT CHANGE UP (ref 8.4–10.5)
CHLORIDE SERPL-SCNC: 105 MMOL/L — SIGNIFICANT CHANGE UP (ref 96–108)
CO2 SERPL-SCNC: 23 MMOL/L — SIGNIFICANT CHANGE UP (ref 22–31)
CREAT SERPL-MCNC: 0.77 MG/DL — SIGNIFICANT CHANGE UP (ref 0.5–1.3)
EOSINOPHIL # BLD AUTO: 0 K/UL — SIGNIFICANT CHANGE UP (ref 0–0.5)
EOSINOPHIL NFR BLD AUTO: 0 % — SIGNIFICANT CHANGE UP (ref 0–6)
FIBRINOGEN PPP-MCNC: 560 MG/DL — HIGH (ref 290–520)
GLUCOSE SERPL-MCNC: 98 MG/DL — SIGNIFICANT CHANGE UP (ref 70–99)
HCT VFR BLD CALC: 20.6 % — CRITICAL LOW (ref 39–50)
HGB BLD-MCNC: 7.5 G/DL — LOW (ref 13–17)
INR BLD: 1.03 RATIO — SIGNIFICANT CHANGE UP (ref 0.88–1.16)
LDH SERPL L TO P-CCNC: 205 U/L — SIGNIFICANT CHANGE UP (ref 50–242)
LYMPHOCYTES # BLD AUTO: 0.21 K/UL — LOW (ref 1–3.3)
LYMPHOCYTES # BLD AUTO: 36 % — SIGNIFICANT CHANGE UP (ref 13–44)
MAGNESIUM SERPL-MCNC: 1.6 MG/DL — SIGNIFICANT CHANGE UP (ref 1.6–2.6)
MANUAL SMEAR VERIFICATION: SIGNIFICANT CHANGE UP
MCHC RBC-ENTMCNC: 30 PG — SIGNIFICANT CHANGE UP (ref 27–34)
MCHC RBC-ENTMCNC: 36.4 GM/DL — HIGH (ref 32–36)
MCV RBC AUTO: 82.4 FL — SIGNIFICANT CHANGE UP (ref 80–100)
MONOCYTES # BLD AUTO: 0.07 K/UL — SIGNIFICANT CHANGE UP (ref 0–0.9)
MONOCYTES NFR BLD AUTO: 12 % — SIGNIFICANT CHANGE UP (ref 2–14)
NEUTROPHILS # BLD AUTO: 0.31 K/UL — LOW (ref 1.8–7.4)
NEUTROPHILS NFR BLD AUTO: 48 % — SIGNIFICANT CHANGE UP (ref 43–77)
NEUTS BAND # BLD: 4 % — SIGNIFICANT CHANGE UP (ref 0–8)
NRBC # BLD: 0 /100 — SIGNIFICANT CHANGE UP (ref 0–0)
PHOSPHATE SERPL-MCNC: 3.7 MG/DL — SIGNIFICANT CHANGE UP (ref 2.5–4.5)
PLAT MORPH BLD: NORMAL — SIGNIFICANT CHANGE UP
PLATELET # BLD AUTO: 6 K/UL — CRITICAL LOW (ref 150–400)
POTASSIUM SERPL-MCNC: 3.3 MMOL/L — LOW (ref 3.5–5.3)
POTASSIUM SERPL-SCNC: 3.3 MMOL/L — LOW (ref 3.5–5.3)
PROT SERPL-MCNC: 6.1 G/DL — SIGNIFICANT CHANGE UP (ref 6–8.3)
PROTHROM AB SERPL-ACNC: 12.3 SEC — SIGNIFICANT CHANGE UP (ref 10.6–13.6)
RBC # BLD: 2.5 M/UL — LOW (ref 4.2–5.8)
RBC # FLD: 12.4 % — SIGNIFICANT CHANGE UP (ref 10.3–14.5)
RBC BLD AUTO: SIGNIFICANT CHANGE UP
SODIUM SERPL-SCNC: 141 MMOL/L — SIGNIFICANT CHANGE UP (ref 135–145)
URATE SERPL-MCNC: 2 MG/DL — LOW (ref 3.4–8.8)
WBC # BLD: 0.59 K/UL — CRITICAL LOW (ref 3.8–10.5)
WBC # FLD AUTO: 0.59 K/UL — CRITICAL LOW (ref 3.8–10.5)

## 2022-01-13 PROCEDURE — 99233 SBSQ HOSP IP/OBS HIGH 50: CPT

## 2022-01-13 RX ORDER — POTASSIUM CHLORIDE 20 MEQ
20 PACKET (EA) ORAL
Refills: 0 | Status: COMPLETED | OUTPATIENT
Start: 2022-01-13 | End: 2022-01-13

## 2022-01-13 RX ORDER — CASPOFUNGIN ACETATE 7 MG/ML
50 INJECTION, POWDER, LYOPHILIZED, FOR SOLUTION INTRAVENOUS EVERY 24 HOURS
Refills: 0 | Status: DISCONTINUED | OUTPATIENT
Start: 2022-01-14 | End: 2022-01-18

## 2022-01-13 RX ORDER — CASPOFUNGIN ACETATE 7 MG/ML
70 INJECTION, POWDER, LYOPHILIZED, FOR SOLUTION INTRAVENOUS ONCE
Refills: 0 | Status: COMPLETED | OUTPATIENT
Start: 2022-01-13 | End: 2022-01-13

## 2022-01-13 RX ORDER — CASPOFUNGIN ACETATE 7 MG/ML
INJECTION, POWDER, LYOPHILIZED, FOR SOLUTION INTRAVENOUS
Refills: 0 | Status: DISCONTINUED | OUTPATIENT
Start: 2022-01-13 | End: 2022-01-18

## 2022-01-13 RX ADMIN — Medication 1 SPRAY(S): at 05:42

## 2022-01-13 RX ADMIN — CYCLOSPORINE 450 MILLIGRAM(S): 100 CAPSULE ORAL at 05:41

## 2022-01-13 RX ADMIN — Medication 15 MILLILITER(S): at 05:42

## 2022-01-13 RX ADMIN — Medication 400 MILLIGRAM(S): at 05:41

## 2022-01-13 RX ADMIN — Medication 80 MILLIGRAM(S): at 05:42

## 2022-01-13 RX ADMIN — Medication 50 MILLIGRAM(S): at 10:51

## 2022-01-13 RX ADMIN — Medication 15 MILLILITER(S): at 12:23

## 2022-01-13 RX ADMIN — SODIUM CHLORIDE 50 MILLILITER(S): 9 INJECTION INTRAMUSCULAR; INTRAVENOUS; SUBCUTANEOUS at 06:52

## 2022-01-13 RX ADMIN — Medication 20 MILLIEQUIVALENT(S): at 10:45

## 2022-01-13 RX ADMIN — AMPICILLIN SODIUM AND SULBACTAM SODIUM 200 GRAM(S): 250; 125 INJECTION, POWDER, FOR SUSPENSION INTRAMUSCULAR; INTRAVENOUS at 17:43

## 2022-01-13 RX ADMIN — Medication 15 MILLILITER(S): at 23:00

## 2022-01-13 RX ADMIN — Medication 1 SPRAY(S): at 12:23

## 2022-01-13 RX ADMIN — CHLORHEXIDINE GLUCONATE 1 APPLICATION(S): 213 SOLUTION TOPICAL at 08:50

## 2022-01-13 RX ADMIN — LORATADINE 10 MILLIGRAM(S): 10 TABLET ORAL at 12:24

## 2022-01-13 RX ADMIN — CASPOFUNGIN ACETATE 260 MILLIGRAM(S): 7 INJECTION, POWDER, LYOPHILIZED, FOR SOLUTION INTRAVENOUS at 10:30

## 2022-01-13 RX ADMIN — AMPICILLIN SODIUM AND SULBACTAM SODIUM 200 GRAM(S): 250; 125 INJECTION, POWDER, FOR SUSPENSION INTRAMUSCULAR; INTRAVENOUS at 23:00

## 2022-01-13 RX ADMIN — AMPICILLIN SODIUM AND SULBACTAM SODIUM 200 GRAM(S): 250; 125 INJECTION, POWDER, FOR SUSPENSION INTRAMUSCULAR; INTRAVENOUS at 12:24

## 2022-01-13 RX ADMIN — Medication 25 MILLIGRAM(S): at 10:50

## 2022-01-13 RX ADMIN — CYCLOSPORINE 450 MILLIGRAM(S): 100 CAPSULE ORAL at 17:45

## 2022-01-13 RX ADMIN — Medication 1 SPRAY(S): at 23:00

## 2022-01-13 RX ADMIN — Medication 20 MILLIEQUIVALENT(S): at 13:49

## 2022-01-13 RX ADMIN — Medication 400 MILLIGRAM(S): at 13:49

## 2022-01-13 RX ADMIN — Medication 1 SPRAY(S): at 17:42

## 2022-01-13 RX ADMIN — Medication 650 MILLIGRAM(S): at 10:50

## 2022-01-13 RX ADMIN — FAMOTIDINE 20 MILLIGRAM(S): 10 INJECTION INTRAVENOUS at 12:24

## 2022-01-13 RX ADMIN — ELTROMBOPAG OLAMINE 150 MILLIGRAM(S): 50 TABLET, FILM COATED ORAL at 12:23

## 2022-01-13 RX ADMIN — Medication 20 MILLIEQUIVALENT(S): at 12:30

## 2022-01-13 RX ADMIN — AMPICILLIN SODIUM AND SULBACTAM SODIUM 200 GRAM(S): 250; 125 INJECTION, POWDER, FOR SUSPENSION INTRAMUSCULAR; INTRAVENOUS at 05:43

## 2022-01-13 RX ADMIN — Medication 15 MILLILITER(S): at 17:42

## 2022-01-13 RX ADMIN — ATOVAQUONE 1500 MILLIGRAM(S): 750 SUSPENSION ORAL at 12:24

## 2022-01-13 RX ADMIN — Medication 400 MILLIGRAM(S): at 22:57

## 2022-01-13 NOTE — PROGRESS NOTE ADULT - SUBJECTIVE AND OBJECTIVE BOX
Diagnosis: Relapsed aplastic anemia    Protocol/Chemo Regimen:  Rabbit ATG/Cyclosporine/prednisone/Promacta     Day:  Rabbit ATG day  12 (D2 suspended due to reaction; resumed on 1/3)     Pt endorsed: improved sinus pressure around eyebrows     Review of Systems: Patient denied nausea, vomiting, odynophagia, chest pain, cough, dyspnea, abdominal pain, constipation, diarrhea, rash, fatigue, headache      Pain scale: 0                                           Diet: regular Enlive QD    Allergies: No Known Allergies      ANTIMICROBIALS  acyclovir   Oral Tab/Cap 400 milliGRAM(s) Oral every 8 hours  ampicillin/sulbactam  IVPB      ampicillin/sulbactam  IVPB 3 Gram(s) IV Intermittent every 6 hours  atovaquone  Suspension 1500 milliGRAM(s) Oral daily  fluconAZOLE   Tablet 200 milliGRAM(s) Oral daily      HEME/ONC MEDICATIONS  eltrombopag 150 milliGRAM(s) Oral daily      STANDING MEDICATIONS  Biotene Dry Mouth Oral Rinse 15 milliLiter(s) Swish and Spit four times a day  chlorhexidine 2% Cloths 1 Application(s) Topical <User Schedule>  cycloSPORINE  (SandIMMUNE) 450 milliGRAM(s) Oral every 12 hours  famotidine    Tablet 20 milliGRAM(s) Oral daily  fluticasone propionate 50 MICROgram(s)/spray Nasal Spray 1 Spray(s) Both Nostrils two times a day  loratadine 10 milliGRAM(s) Oral daily  methylPREDNISolone sodium succinate IVPB 500 milliGRAM(s) IV Intermittent once  predniSONE   Tablet 80 milliGRAM(s) Oral daily  sodium chloride 0.65% Nasal 1 Spray(s) Both Nostrils four times a day  sodium chloride 0.9%. 1000 milliLiter(s) IV Continuous <Continuous>      PRN MEDICATIONS  acetaminophen     Tablet .. 650 milliGRAM(s) Oral every 6 hours PRN  acetaminophen   IVPB .. 1000 milliGRAM(s) IV Intermittent once PRN  aluminum hydroxide/magnesium hydroxide/simethicone Suspension 30 milliLiter(s) Oral every 4 hours PRN  calcium carbonate    500 mG (Tums) Chewable 1 Tablet(s) Chew every 4 hours PRN  diphenhydrAMINE 25 milliGRAM(s) Oral every 12 hours PRN  diphenhydrAMINE Injectable 25 milliGRAM(s) IV Push once PRN  EPINEPHrine     1 mG/mL Injectable 0.3 milliGRAM(s) IntraMuscular once PRN  hydrocortisone sodium succinate Injectable 50 milliGRAM(s) IV Push every 12 hours PRN  metoclopramide Injectable 10 milliGRAM(s) IV Push every 6 hours PRN  ondansetron Injectable 8 milliGRAM(s) IV Push every 8 hours PRN  oxyCODONE    IR 5 milliGRAM(s) Oral every 6 hours PRN      Vital Signs Last 24 Hrs  T(C): 36.7 (13 Jan 2022 06:16), Max: 36.9 (12 Jan 2022 09:15)  T(F): 98 (13 Jan 2022 06:16), Max: 98.5 (12 Jan 2022 09:15)  HR: 75 (13 Jan 2022 06:16) (75 - 94)  BP: 137/81 (13 Jan 2022 06:16) (127/74 - 144/80)  BP(mean): --  RR: 18 (13 Jan 2022 06:16) (18 - 18)  SpO2: 97% (13 Jan 2022 06:16) (96% - 98%)      PHYSICAL EXAM  General: adult in NAD  HEENT: clear oropharynx, +mild tenderness  around eyebrows   CV: normal S1/S2 RRR  Lungs: clear to auscultation, no wheezes, no rales  Abdomen: soft non-tender non-distended  Ext: no edema  Skin: no rash  Neuro: alert and oriented X 4  Central Line: RUE PICC  c/d/i            LABS:                        7.5    0.53  )-----------( 15       ( 12 Jan 2022 08:03 )             20.5         Mean Cell Volume : 82.3 fl  Mean Cell Hemoglobin : 30.1 pg  Mean Cell Hemoglobin Concentration : 36.6 gm/dL  Auto Neutrophil # : 0.35 K/uL  Auto Lymphocyte # : 0.07 K/uL  Auto Monocyte # : 0.09 K/uL  Auto Eosinophil # : 0.00 K/uL  Auto Basophil # : 0.00 K/uL  Auto Neutrophil % : 64.9 %  Auto Lymphocyte % : 12.3 %  Auto Monocyte % : 17.5 %  Auto Eosinophil % : 0.0 %  Auto Basophil % : 0.0 %      01-12    139  |  103  |  18  ----------------------------<  119<H>  4.0   |  25  |  0.76    Ca    9.0      12 Jan 2022 08:03  Phos  3.6     01-12  Mg     1.9     01-12    TPro  6.5  /  Alb  3.8  /  TBili  1.5<H>  /  DBili  x   /  AST  58<H>  /  ALT  164<H>  /  AlkPhos  64  01-12      Mg 1.9  Phos 3.6      PT/INR - ( 11 Jan 2022 08:03 )   PT: 14.1 sec;   INR: 1.18 ratio         PTT - ( 11 Jan 2022 08:03 )  PTT:25.6 sec      Uric Acid 1.9        RECENT CULTURES:    COVID-19 PCR (01.09.22 @ 07:58)    COVID-19 PCR: NotDetec      RADIOLOGY & ADDITIONAL STUDIES:    CT Sinuses w/ IV Cont (01.09.22 @ 11:32) >   Pansinusitis.           Diagnosis: Relapsed aplastic anemia    Protocol/Chemo Regimen:  Rabbit ATG/Cyclosporine/prednisone/Promacta     Day:  Rabbit ATG day  12 (D2 suspended due to reaction; resumed on 1/3)     Pt endorsed: improved sinus pressure around eyebrows     Review of Systems: Patient denied nausea, vomiting, odynophagia, chest pain, cough, dyspnea, abdominal pain, constipation, diarrhea, rash, fatigue, headache      Pain scale: 0                                           Diet: regular Enlive QD    Allergies: No Known Allergies      ANTIMICROBIALS  acyclovir   Oral Tab/Cap 400 milliGRAM(s) Oral every 8 hours  ampicillin/sulbactam  IVPB      ampicillin/sulbactam  IVPB 3 Gram(s) IV Intermittent every 6 hours  atovaquone  Suspension 1500 milliGRAM(s) Oral daily  fluconAZOLE   Tablet 200 milliGRAM(s) Oral daily      HEME/ONC MEDICATIONS  eltrombopag 150 milliGRAM(s) Oral daily      STANDING MEDICATIONS  Biotene Dry Mouth Oral Rinse 15 milliLiter(s) Swish and Spit four times a day  chlorhexidine 2% Cloths 1 Application(s) Topical <User Schedule>  cycloSPORINE  (SandIMMUNE) 450 milliGRAM(s) Oral every 12 hours  famotidine    Tablet 20 milliGRAM(s) Oral daily  fluticasone propionate 50 MICROgram(s)/spray Nasal Spray 1 Spray(s) Both Nostrils two times a day  loratadine 10 milliGRAM(s) Oral daily  methylPREDNISolone sodium succinate IVPB 500 milliGRAM(s) IV Intermittent once  predniSONE   Tablet 80 milliGRAM(s) Oral daily  sodium chloride 0.65% Nasal 1 Spray(s) Both Nostrils four times a day  sodium chloride 0.9%. 1000 milliLiter(s) IV Continuous <Continuous>      PRN MEDICATIONS  acetaminophen     Tablet .. 650 milliGRAM(s) Oral every 6 hours PRN  acetaminophen   IVPB .. 1000 milliGRAM(s) IV Intermittent once PRN  aluminum hydroxide/magnesium hydroxide/simethicone Suspension 30 milliLiter(s) Oral every 4 hours PRN  calcium carbonate    500 mG (Tums) Chewable 1 Tablet(s) Chew every 4 hours PRN  diphenhydrAMINE 25 milliGRAM(s) Oral every 12 hours PRN  diphenhydrAMINE Injectable 25 milliGRAM(s) IV Push once PRN  EPINEPHrine     1 mG/mL Injectable 0.3 milliGRAM(s) IntraMuscular once PRN  hydrocortisone sodium succinate Injectable 50 milliGRAM(s) IV Push every 12 hours PRN  metoclopramide Injectable 10 milliGRAM(s) IV Push every 6 hours PRN  ondansetron Injectable 8 milliGRAM(s) IV Push every 8 hours PRN  oxyCODONE    IR 5 milliGRAM(s) Oral every 6 hours PRN      Vital Signs Last 24 Hrs  T(C): 36.7 (13 Jan 2022 06:16), Max: 36.9 (12 Jan 2022 09:15)  T(F): 98 (13 Jan 2022 06:16), Max: 98.5 (12 Jan 2022 09:15)  HR: 75 (13 Jan 2022 06:16) (75 - 94)  BP: 137/81 (13 Jan 2022 06:16) (127/74 - 144/80)  BP(mean): --  RR: 18 (13 Jan 2022 06:16) (18 - 18)  SpO2: 97% (13 Jan 2022 06:16) (96% - 98%)      PHYSICAL EXAM  General: adult in NAD  HEENT: clear oropharynx, +mild tenderness  around eyebrows   CV: normal S1/S2 RRR  Lungs: clear to auscultation, no wheezes, no rales  Abdomen: soft non-tender non-distended  Ext: no edema  Skin: no rash  Neuro: alert and oriented X 4  Central Line: RUE PICC  c/d/i    LABS:                          7.5    0.59  )-----------( 6        ( 13 Jan 2022 07:02 )             20.6         Mean Cell Volume : 82.4 fl  Mean Cell Hemoglobin : 30.0 pg  Mean Cell Hemoglobin Concentration : 36.4 gm/dL  Auto Neutrophil # : 0.31 K/uL  Auto Lymphocyte # : 0.21 K/uL  Auto Monocyte # : 0.07 K/uL  Auto Eosinophil # : 0.00 K/uL  Auto Basophil # : 0.00 K/uL  Auto Neutrophil % : 48.0 %  Auto Lymphocyte % : 36.0 %  Auto Monocyte % : 12.0 %  Auto Eosinophil % : 0.0 %  Auto Basophil % : 0.0 %      01-13    141  |  105  |  19  ----------------------------<  98  3.3<L>   |  23  |  0.77    Ca    8.6      13 Jan 2022 07:02  Phos  3.7     01-13  Mg     1.6     01-13    TPro  6.1  /  Alb  3.7  /  TBili  1.2  /  DBili  x   /  AST  58<H>  /  ALT  248<H>  /  AlkPhos  70  01-13      PT/INR - ( 13 Jan 2022 07:02 )   PT: 12.3 sec;   INR: 1.03 ratio         PTT - ( 13 Jan 2022 07:02 )  PTT:22.6 sec      Uric Acid 2.0          RECENT CULTURES:    COVID-19 PCR (01.09.22 @ 07:58)    COVID-19 PCR: NotDetec      RADIOLOGY & ADDITIONAL STUDIES:    CT Sinuses w/ IV Cont (01.09.22 @ 11:32) >   Pansinusitis.

## 2022-01-13 NOTE — PROGRESS NOTE ADULT - ATTENDING COMMENTS
36M with Aplastic Anemia diagnosed April 2020.  Underwent treatment with equine ATG, Cyclosporine, and Promacta.  Went into remission (BM bx 5/2021 with normal morphology and cellularity).  Promacta stopped May 2021.  Cyclosporin stopped August 2021.  By September, platelets started to drop.  Now with pancytopenia and neutropenic fever.  No obvious focus of infection. On cefepime, diflucan, acyclovir and mepron.  On cyclosporine 300 mg BID (started on 12/26/21), first level on 12/29/21 --197 increased dose to 325mg bid, repeat level  133, increasing CsA to 350 q12hr.   Today is cyclosporine day 17, day 11 rATG  Completed rATG and continues with prednisone and Promacta.  CT sinuses with pansinutitis, appreciate ENT recs, on unasyn, monitor for improvement  continue tylenol PRN  follow CsA trough, Mg, LFTs 36M with Aplastic Anemia diagnosed April 2020.  Underwent treatment with equine ATG, Cyclosporine, and Promacta.  Went into remission (BM bx 5/2021 with normal morphology and cellularity).  Promacta stopped May 2021.  Cyclosporin stopped August 2021.  By September, platelets started to drop.  Now with pancytopenia and neutropenic fever.  No obvious focus of infection. On cefepime, diflucan, acyclovir and mepron.  On cyclosporine 300 mg BID (started on 12/26/21), first level on 12/29/21 --197 increased dose to 325mg bid, repeat level  133, increasing CsA to 350 q12hr.   Today is cyclosporine day 18, day 12 rATG    Completed rATG and continues with prednisone and Promacta.  CT sinuses with pansinutitis, appreciate ENT recs, on unasyn, monitor for improvement  continue tylenol PRN  follow CsA trough, Mg, LFTs 36M with Aplastic Anemia diagnosed April 2020.  Underwent treatment with equine ATG, Cyclosporine, and Promacta.  Went into remission (BM bx 5/2021 with normal morphology and cellularity).  Promacta stopped May 2021.  Cyclosporin stopped August 2021.  By September, platelets started to drop.  Now with pancytopenia and neutropenic fever.  No obvious focus of infection. On cefepime, diflucan, acyclovir and mepron.  On cyclosporine 300 mg BID (started on 12/26/21), first level on 12/29/21 --197 increased dose to 325mg bid, repeat level  133, increasing CsA to 350 q12hr.   Today is cyclosporine day 18, day 12 rATG    - Feels well today, requiring transfusions daily  - Completed rATG and continues with prednisone and Promacta.  - CT sinuses with pansinutitis, appreciate ENT recs, on unasyn, monitor for improvement  - continue tylenol PRN  - follow CsA trough, adjust as needed, follow Mg, LFTs

## 2022-01-13 NOTE — PROGRESS NOTE ADULT - PROBLEM SELECTOR PLAN 1
Relapsed Aplastic Anemia   s/p hATG, CSA, and promacta with complete remission in 2020, BM bx on 11/9/20 revealed hypocellular marrow.  BMbx 12/2021 showing erythroid predominance, myeloid and erythroid maturation, and markedly decreased megakaryocytes c/w relapse  12/23-   transferred  to [facility] for rATG treatment  Monitor CBC with diff/lytes, transfuse and or replete PRN, Monitor weight, I and O, mouth care  12/26- Started Cyclosporine 300 mg PO Q 12 hrs , 12/27 test dose - no reaction noted  12/28 PICC placed  12/29 Started  Rabbit ATG/prednisone-Reaction to rATG- HELD on 12/30 due to reaction ; Continue promacta  1/3 Resumed  rabbit ATG/prednisone  1/6 completed rATG. PLTs x 1 abg. counts at clint.  1/7  CSA level 133, increased Cyclosporine dose to 350mg BID. re-check levels qTues/Fri  1/8- F/u Cyclosporine level on 1/9 before am dose- 87.  1/10- CSP level- 77, increased CSP to 400 mg BID with f/u level on Wed 1/12 and 1/14.  1/10- Prednisone taper start on 1/12.  1/12 monitor transaminitis and hyperbilirubinemia Relapsed Aplastic Anemia   s/p hATG, CSA, and promacta with complete remission in 2020, BM bx on 11/9/20 revealed hypocellular marrow.  BMbx 12/2021 showing erythroid predominance, myeloid and erythroid maturation, and markedly decreased megakaryocytes c/w relapse  12/23-   transferred  to [facility] for rATG treatment  Monitor CBC with diff/lytes, transfuse and or replete PRN, Monitor weight, I and O, mouth care  12/26- Started Cyclosporine 300 mg PO Q 12 hrs , 12/27 test dose - no reaction noted  12/28 PICC placed  12/29 Started  Rabbit ATG/prednisone-Reaction to rATG- HELD on 12/30 due to reaction ; Continue promacta  1/3 Resumed  rabbit ATG/prednisone  1/6 completed rATG. PLTs x 1 abg. counts at clint.  1/7  CSA level 133, increased Cyclosporine dose to 350mg BID. re-check levels qTues/Fri 1/8- F/u Cyclosporine level on 1/9 before am dose- 87.  1/10- CSP level- 77, increased CSP to 400 mg BID with f/u level on Wed 1/12 and 1/14.  1/10- Prednisone taper start on 1/12.  1/12 monitor transaminitis and hyperbilirubinemia  Hypokalemia: KCL 20 meq po x3  Thrombocytopenia: PLT x1

## 2022-01-13 NOTE — PROGRESS NOTE ADULT - PROBLEM SELECTOR PLAN 4
no Lovenox DVT OPPX due to thrombocytopenia  Encourage OOB and ambulation no Lovenox DVT PPX due to thrombocytopenia  Encourage OOB and ambulation

## 2022-01-13 NOTE — PROGRESS NOTE ADULT - PROBLEM SELECTOR PLAN 2
Neutropenic, afebrile   continue diflucan, Acyclovir, Mepron.  CT CAP  to look for source of infection- No CT evidence for source of infection in the chest, abdomen or pelvis.  12/24- Staph aureus PCR detected, will start Vanco 1 gm Q 12 hrs stopped 12/26. CT maxillo facial did not reveal any abscess.   1/5 if fever recurs will discontinue Diflucan and start voriconazole as per ID.  1/9- CT sinus with contrast shows pansinusitis, ENT consulted started on Unasyn 3gm Q 6 hrs, d/adriana Cefepime and Zithromax.  1/10- If febrile, switch Diflucan to  Voriconazole.  ID following  Pancx CXR if fever Neutropenic, afebrile   continue diflucan, Acyclovir, Mepron.  CT CAP  to look for source of infection- No CT evidence for source of infection in the chest, abdomen or pelvis.  12/24- Staph aureus PCR detected, will start Vanco 1 gm Q 12 hrs stopped 12/26. CT maxillo facial did not reveal any abscess.   1/5 if fever recurs will discontinue Diflucan and start voriconazole as per ID.  1/9- CT sinus with contrast shows pansinusitis, ENT consulted started on Unasyn 3gm Q 6 hrs, d/adriana Cefepime and Zithromax.  1/10- If febrile, switch Diflucan to  Voriconazole.  ID following  Pancx CXR if fever  1/13 switch Diflucan to Caspofungin d/t transaminitis

## 2022-01-14 DIAGNOSIS — R74.01 ELEVATION OF LEVELS OF LIVER TRANSAMINASE LEVELS: ICD-10-CM

## 2022-01-14 LAB
ALBUMIN SERPL ELPH-MCNC: 3.4 G/DL — SIGNIFICANT CHANGE UP (ref 3.3–5)
ALP SERPL-CCNC: 65 U/L — SIGNIFICANT CHANGE UP (ref 40–120)
ALT FLD-CCNC: 196 U/L — HIGH (ref 10–45)
ANION GAP SERPL CALC-SCNC: 13 MMOL/L — SIGNIFICANT CHANGE UP (ref 5–17)
APTT BLD: 23.3 SEC — LOW (ref 27.5–35.5)
AST SERPL-CCNC: 25 U/L — SIGNIFICANT CHANGE UP (ref 10–40)
BASOPHILS # BLD AUTO: 0 K/UL — SIGNIFICANT CHANGE UP (ref 0–0.2)
BASOPHILS NFR BLD AUTO: 0 % — SIGNIFICANT CHANGE UP (ref 0–2)
BILIRUB SERPL-MCNC: 1 MG/DL — SIGNIFICANT CHANGE UP (ref 0.2–1.2)
BUN SERPL-MCNC: 20 MG/DL — SIGNIFICANT CHANGE UP (ref 7–23)
CALCIUM SERPL-MCNC: 8.9 MG/DL — SIGNIFICANT CHANGE UP (ref 8.4–10.5)
CHLORIDE SERPL-SCNC: 103 MMOL/L — SIGNIFICANT CHANGE UP (ref 96–108)
CO2 SERPL-SCNC: 23 MMOL/L — SIGNIFICANT CHANGE UP (ref 22–31)
CREAT SERPL-MCNC: 0.73 MG/DL — SIGNIFICANT CHANGE UP (ref 0.5–1.3)
CYCLOSPORINE SER-MCNC: 192 NG/ML — SIGNIFICANT CHANGE UP (ref 150–400)
EOSINOPHIL # BLD AUTO: 0 K/UL — SIGNIFICANT CHANGE UP (ref 0–0.5)
EOSINOPHIL NFR BLD AUTO: 0 % — SIGNIFICANT CHANGE UP (ref 0–6)
FIBRINOGEN PPP-MCNC: 510 MG/DL — SIGNIFICANT CHANGE UP (ref 290–520)
GLUCOSE SERPL-MCNC: 89 MG/DL — SIGNIFICANT CHANGE UP (ref 70–99)
HCT VFR BLD CALC: 20.2 % — CRITICAL LOW (ref 39–50)
HGB BLD-MCNC: 7.1 G/DL — LOW (ref 13–17)
IMM GRANULOCYTES NFR BLD AUTO: 1.6 % — HIGH (ref 0–1.5)
INR BLD: 1.02 RATIO — SIGNIFICANT CHANGE UP (ref 0.88–1.16)
LDH SERPL L TO P-CCNC: 188 U/L — SIGNIFICANT CHANGE UP (ref 50–242)
LYMPHOCYTES # BLD AUTO: 0.17 K/UL — LOW (ref 1–3.3)
LYMPHOCYTES # BLD AUTO: 27.4 % — SIGNIFICANT CHANGE UP (ref 13–44)
MAGNESIUM SERPL-MCNC: 1.6 MG/DL — SIGNIFICANT CHANGE UP (ref 1.6–2.6)
MANUAL SMEAR VERIFICATION: SIGNIFICANT CHANGE UP
MCHC RBC-ENTMCNC: 29.5 PG — SIGNIFICANT CHANGE UP (ref 27–34)
MCHC RBC-ENTMCNC: 35.1 GM/DL — SIGNIFICANT CHANGE UP (ref 32–36)
MCV RBC AUTO: 83.8 FL — SIGNIFICANT CHANGE UP (ref 80–100)
MONOCYTES # BLD AUTO: 0.12 K/UL — SIGNIFICANT CHANGE UP (ref 0–0.9)
MONOCYTES NFR BLD AUTO: 19.4 % — HIGH (ref 2–14)
NEUTROPHILS # BLD AUTO: 0.32 K/UL — LOW (ref 1.8–7.4)
NEUTROPHILS NFR BLD AUTO: 51.6 % — SIGNIFICANT CHANGE UP (ref 43–77)
NRBC # BLD: 2 /100 WBCS — HIGH (ref 0–0)
PHOSPHATE SERPL-MCNC: 3.6 MG/DL — SIGNIFICANT CHANGE UP (ref 2.5–4.5)
PLAT MORPH BLD: NORMAL — SIGNIFICANT CHANGE UP
PLATELET # BLD AUTO: 27 K/UL — LOW (ref 150–400)
POTASSIUM SERPL-MCNC: 3.9 MMOL/L — SIGNIFICANT CHANGE UP (ref 3.5–5.3)
POTASSIUM SERPL-SCNC: 3.9 MMOL/L — SIGNIFICANT CHANGE UP (ref 3.5–5.3)
PROT SERPL-MCNC: 6 G/DL — SIGNIFICANT CHANGE UP (ref 6–8.3)
PROTHROM AB SERPL-ACNC: 12.2 SEC — SIGNIFICANT CHANGE UP (ref 10.6–13.6)
RBC # BLD: 2.41 M/UL — LOW (ref 4.2–5.8)
RBC # FLD: 12.1 % — SIGNIFICANT CHANGE UP (ref 10.3–14.5)
RBC BLD AUTO: SIGNIFICANT CHANGE UP
SODIUM SERPL-SCNC: 139 MMOL/L — SIGNIFICANT CHANGE UP (ref 135–145)
URATE SERPL-MCNC: 2 MG/DL — LOW (ref 3.4–8.8)
WBC # BLD: 0.62 K/UL — CRITICAL LOW (ref 3.8–10.5)
WBC # FLD AUTO: 0.62 K/UL — CRITICAL LOW (ref 3.8–10.5)

## 2022-01-14 PROCEDURE — 99233 SBSQ HOSP IP/OBS HIGH 50: CPT

## 2022-01-14 RX ORDER — CYCLOSPORINE 100 MG/1
475 CAPSULE ORAL EVERY 12 HOURS
Refills: 0 | Status: DISCONTINUED | OUTPATIENT
Start: 2022-01-14 | End: 2022-01-16

## 2022-01-14 RX ADMIN — Medication 650 MILLIGRAM(S): at 12:32

## 2022-01-14 RX ADMIN — AMPICILLIN SODIUM AND SULBACTAM SODIUM 200 GRAM(S): 250; 125 INJECTION, POWDER, FOR SUSPENSION INTRAMUSCULAR; INTRAVENOUS at 11:13

## 2022-01-14 RX ADMIN — CHLORHEXIDINE GLUCONATE 1 APPLICATION(S): 213 SOLUTION TOPICAL at 08:30

## 2022-01-14 RX ADMIN — Medication 15 MILLILITER(S): at 05:49

## 2022-01-14 RX ADMIN — AMPICILLIN SODIUM AND SULBACTAM SODIUM 200 GRAM(S): 250; 125 INJECTION, POWDER, FOR SUSPENSION INTRAMUSCULAR; INTRAVENOUS at 05:55

## 2022-01-14 RX ADMIN — AMPICILLIN SODIUM AND SULBACTAM SODIUM 200 GRAM(S): 250; 125 INJECTION, POWDER, FOR SUSPENSION INTRAMUSCULAR; INTRAVENOUS at 23:39

## 2022-01-14 RX ADMIN — Medication 15 MILLILITER(S): at 17:50

## 2022-01-14 RX ADMIN — Medication 400 MILLIGRAM(S): at 05:50

## 2022-01-14 RX ADMIN — Medication 80 MILLIGRAM(S): at 05:49

## 2022-01-14 RX ADMIN — Medication 1 SPRAY(S): at 11:05

## 2022-01-14 RX ADMIN — ELTROMBOPAG OLAMINE 150 MILLIGRAM(S): 50 TABLET, FILM COATED ORAL at 11:13

## 2022-01-14 RX ADMIN — LORATADINE 10 MILLIGRAM(S): 10 TABLET ORAL at 11:13

## 2022-01-14 RX ADMIN — Medication 400 MILLIGRAM(S): at 21:14

## 2022-01-14 RX ADMIN — Medication 1 SPRAY(S): at 17:51

## 2022-01-14 RX ADMIN — Medication 15 MILLILITER(S): at 11:04

## 2022-01-14 RX ADMIN — CASPOFUNGIN ACETATE 260 MILLIGRAM(S): 7 INJECTION, POWDER, LYOPHILIZED, FOR SOLUTION INTRAVENOUS at 11:03

## 2022-01-14 RX ADMIN — Medication 1 SPRAY(S): at 05:50

## 2022-01-14 RX ADMIN — CYCLOSPORINE 475 MILLIGRAM(S): 100 CAPSULE ORAL at 17:52

## 2022-01-14 RX ADMIN — CYCLOSPORINE 450 MILLIGRAM(S): 100 CAPSULE ORAL at 05:52

## 2022-01-14 RX ADMIN — AMPICILLIN SODIUM AND SULBACTAM SODIUM 200 GRAM(S): 250; 125 INJECTION, POWDER, FOR SUSPENSION INTRAMUSCULAR; INTRAVENOUS at 17:51

## 2022-01-14 RX ADMIN — Medication 650 MILLIGRAM(S): at 12:58

## 2022-01-14 RX ADMIN — SODIUM CHLORIDE 50 MILLILITER(S): 9 INJECTION INTRAMUSCULAR; INTRAVENOUS; SUBCUTANEOUS at 06:45

## 2022-01-14 RX ADMIN — ATOVAQUONE 1500 MILLIGRAM(S): 750 SUSPENSION ORAL at 11:12

## 2022-01-14 RX ADMIN — Medication 400 MILLIGRAM(S): at 14:22

## 2022-01-14 RX ADMIN — FAMOTIDINE 20 MILLIGRAM(S): 10 INJECTION INTRAVENOUS at 11:13

## 2022-01-14 NOTE — PROVIDER CONTACT NOTE (OTHER) - ASSESSMENT
4/10 gum pain at rest that worsens when eating. Radiates to head causing headache.
Pt A&Ox4 and denies pain or discomfort at R PICC site, dressing WDL and intact, pt denies pain or discomfort well flushing, R PICC Flushes w/o resistance.
Pt axox4, tachycardic, asymptomatic and denies chest tightness.
Pt AOx4, responsive. pt 102.8 temp, c/o lightheadedness and h/a. /64, 98% on RA, 110 HR. CXR, UA, and blood cultures completed.
Pt axox4, febrile, tachycardic, asymptomatic and denies chest tightness.

## 2022-01-14 NOTE — PROVIDER CONTACT NOTE (OTHER) - REASON
Pt tachycardic with HR of 114.
Blood at insertion site of R PICC line
Pt oral temp 102
pt 102.8 temp, c/o lightheadedness and h/a
Patient complaining of worsening gum pain

## 2022-01-14 NOTE — PROGRESS NOTE ADULT - ATTENDING COMMENTS
36M with Aplastic Anemia diagnosed April 2020.  Underwent treatment with equine ATG, Cyclosporine, and Promacta.  Went into remission (BM bx 5/2021 with normal morphology and cellularity).  Promacta stopped May 2021.  Cyclosporin stopped August 2021.  By September, platelets started to drop.  Now with pancytopenia and neutropenic fever.  No obvious focus of infection. On cefepime, diflucan, acyclovir and mepron.  On cyclosporine 300 mg BID (started on 12/26/21), first level on 12/29/21 --197 increased dose to 325mg bid, repeat level  133, increasing CsA to 350 q12hr.   Today is cyclosporine day 18, day 12 rATG    - Feels well today, requiring transfusions daily  - Completed rATG and continues with prednisone and Promacta.  - CT sinuses with pansinutitis, appreciate ENT recs, on unasyn, monitor for improvement  - continue tylenol PRN  - follow CsA trough, adjust as needed, follow Mg, LFTs 36M with Aplastic Anemia diagnosed April 2020.  Underwent treatment with equine ATG, Cyclosporine, and Promacta.  Went into remission (BM bx 5/2021 with normal morphology and cellularity).  Promacta stopped May 2021.  Cyclosporin stopped August 2021.  By September, platelets started to drop.  Now with pancytopenia and neutropenic fever.  No obvious focus of infection. On cefepime, diflucan, acyclovir and mepron.  On cyclosporine 300 mg BID (started on 12/26/21), first level on 12/29/21 --197 increased dose to 325mg bid, repeat level  133, increasing CsA to 350 q12hr.   Today is cyclosporine day 19, day 13 rATG    - Feels well today, requiring transfusions daily  - Completed rATG and continues with prednisone and Promacta.  - CT sinuses with pansinutitis, appreciate ENT recs, on unasyn, monitor for improvement  - continue tylenol PRN  - follow CsA trough, adjust as needed, follow Mg, LFTs  - Transaminitis now downtrending, switched to caspofungin 1/13/22

## 2022-01-14 NOTE — PROGRESS NOTE ADULT - SUBJECTIVE AND OBJECTIVE BOX
Diagnosis:    Protocol/Chemo Regimen:    Day:     Pt endorsed:    Review of Systems:     Pain scale:     Diet:     Allergies    No Known Allergies    Intolerances        ANTIMICROBIALS  acyclovir   Oral Tab/Cap 400 milliGRAM(s) Oral every 8 hours  ampicillin/sulbactam  IVPB      ampicillin/sulbactam  IVPB 3 Gram(s) IV Intermittent every 6 hours  atovaquone  Suspension 1500 milliGRAM(s) Oral daily  caspofungin IVPB      caspofungin IVPB 50 milliGRAM(s) IV Intermittent every 24 hours      HEME/ONC MEDICATIONS  eltrombopag 150 milliGRAM(s) Oral daily      STANDING MEDICATIONS  Biotene Dry Mouth Oral Rinse 15 milliLiter(s) Swish and Spit four times a day  chlorhexidine 2% Cloths 1 Application(s) Topical <User Schedule>  cycloSPORINE  (SandIMMUNE) 450 milliGRAM(s) Oral every 12 hours  famotidine    Tablet 20 milliGRAM(s) Oral daily  fluticasone propionate 50 MICROgram(s)/spray Nasal Spray 1 Spray(s) Both Nostrils two times a day  loratadine 10 milliGRAM(s) Oral daily  methylPREDNISolone sodium succinate IVPB 500 milliGRAM(s) IV Intermittent once  sodium chloride 0.65% Nasal 1 Spray(s) Both Nostrils four times a day  sodium chloride 0.9%. 1000 milliLiter(s) IV Continuous <Continuous>      PRN MEDICATIONS  acetaminophen     Tablet .. 650 milliGRAM(s) Oral every 6 hours PRN  acetaminophen   IVPB .. 1000 milliGRAM(s) IV Intermittent once PRN  aluminum hydroxide/magnesium hydroxide/simethicone Suspension 30 milliLiter(s) Oral every 4 hours PRN  calcium carbonate    500 mG (Tums) Chewable 1 Tablet(s) Chew every 4 hours PRN  diphenhydrAMINE 25 milliGRAM(s) Oral every 12 hours PRN  diphenhydrAMINE Injectable 25 milliGRAM(s) IV Push once PRN  EPINEPHrine     1 mG/mL Injectable 0.3 milliGRAM(s) IntraMuscular once PRN  hydrocortisone sodium succinate Injectable 50 milliGRAM(s) IV Push every 12 hours PRN  metoclopramide Injectable 10 milliGRAM(s) IV Push every 6 hours PRN  ondansetron Injectable 8 milliGRAM(s) IV Push every 8 hours PRN  oxyCODONE    IR 5 milliGRAM(s) Oral every 6 hours PRN        Vital Signs Last 24 Hrs  T(C): 36.7 (14 Jan 2022 05:36), Max: 36.9 (13 Jan 2022 13:15)  T(F): 98.1 (14 Jan 2022 05:36), Max: 98.4 (13 Jan 2022 13:15)  HR: 75 (14 Jan 2022 05:36) (75 - 94)  BP: 120/76 (14 Jan 2022 05:36) (120/76 - 152/80)  BP(mean): --  RR: 18 (14 Jan 2022 05:36) (18 - 18)  SpO2: 97% (14 Jan 2022 05:36) (95% - 98%)    PHYSICAL EXAM  General: NAD  HEENT: PERRLA, EOMOI, clear oropharynx, anicteric sclera, pink conjunctiva  Neck: supple  CV: (+) S1/S2 RRR  Lungs: clear to auscultation, no wheezes or rales  Abdomen: soft, non-tender, non-distended (+) BS  Ext: no clubbing, cyanosis or edema  Skin: no rashes and no petechiae  Neuro: alert and oriented X 3, no focal deficits  Central Line:     RECENT CULTURES:        LABS:                        7.5    0.59  )-----------( 6        ( 13 Jan 2022 07:02 )             20.6         Mean Cell Volume : 82.4 fl  Mean Cell Hemoglobin : 30.0 pg  Mean Cell Hemoglobin Concentration : 36.4 gm/dL  Auto Neutrophil # : 0.31 K/uL  Auto Lymphocyte # : 0.21 K/uL  Auto Monocyte # : 0.07 K/uL  Auto Eosinophil # : 0.00 K/uL  Auto Basophil # : 0.00 K/uL  Auto Neutrophil % : 48.0 %  Auto Lymphocyte % : 36.0 %  Auto Monocyte % : 12.0 %  Auto Eosinophil % : 0.0 %  Auto Basophil % : 0.0 %      01-13    141  |  105  |  19  ----------------------------<  98  3.3<L>   |  23  |  0.77    Ca    8.6      13 Jan 2022 07:02  Phos  3.7     01-13  Mg     1.6     01-13    TPro  6.1  /  Alb  3.7  /  TBili  1.2  /  DBili  x   /  AST  58<H>  /  ALT  248<H>  /  AlkPhos  70  01-13          PT/INR - ( 13 Jan 2022 07:02 )   PT: 12.3 sec;   INR: 1.03 ratio         PTT - ( 13 Jan 2022 07:02 )  PTT:22.6 sec        RADIOLOGY & ADDITIONAL STUDIES:         Diagnosis: Relapsed aplastic anemia    Protocol/Chemo Regimen:  Rabbit ATG/Cyclosporine/prednisone/Promacta     Day:  Rabbit ATG day  13 (D2 suspended due to reaction; resumed on 1/3)     Pt endorsed: + sinus pain improved     Review of Systems: Denies any chest pain, palpitation, SOB, abdominal pain or dysuria.    Pain scale: Denies                                         Diet: regular Enlive QD    Allergies: No Known Allergies    ANTIMICROBIALS  acyclovir   Oral Tab/Cap 400 milliGRAM(s) Oral every 8 hours  ampicillin/sulbactam  IVPB      ampicillin/sulbactam  IVPB 3 Gram(s) IV Intermittent every 6 hours  atovaquone  Suspension 1500 milliGRAM(s) Oral daily  caspofungin IVPB      caspofungin IVPB 50 milliGRAM(s) IV Intermittent every 24 hours    HEME/ONC MEDICATIONS  eltrombopag 150 milliGRAM(s) Oral daily    STANDING MEDICATIONS  Biotene Dry Mouth Oral Rinse 15 milliLiter(s) Swish and Spit four times a day  chlorhexidine 2% Cloths 1 Application(s) Topical <User Schedule>  cycloSPORINE  (SandIMMUNE) 450 milliGRAM(s) Oral every 12 hours  famotidine    Tablet 20 milliGRAM(s) Oral daily  fluticasone propionate 50 MICROgram(s)/spray Nasal Spray 1 Spray(s) Both Nostrils two times a day  loratadine 10 milliGRAM(s) Oral daily  methylPREDNISolone sodium succinate IVPB 500 milliGRAM(s) IV Intermittent once  sodium chloride 0.65% Nasal 1 Spray(s) Both Nostrils four times a day  sodium chloride 0.9%. 1000 milliLiter(s) IV Continuous <Continuous>    PRN MEDICATIONS  acetaminophen     Tablet .. 650 milliGRAM(s) Oral every 6 hours PRN  acetaminophen   IVPB .. 1000 milliGRAM(s) IV Intermittent once PRN  aluminum hydroxide/magnesium hydroxide/simethicone Suspension 30 milliLiter(s) Oral every 4 hours PRN  calcium carbonate    500 mG (Tums) Chewable 1 Tablet(s) Chew every 4 hours PRN  diphenhydrAMINE 25 milliGRAM(s) Oral every 12 hours PRN  diphenhydrAMINE Injectable 25 milliGRAM(s) IV Push once PRN  EPINEPHrine     1 mG/mL Injectable 0.3 milliGRAM(s) IntraMuscular once PRN  hydrocortisone sodium succinate Injectable 50 milliGRAM(s) IV Push every 12 hours PRN  metoclopramide Injectable 10 milliGRAM(s) IV Push every 6 hours PRN  ondansetron Injectable 8 milliGRAM(s) IV Push every 8 hours PRN  oxyCODONE    IR 5 milliGRAM(s) Oral every 6 hours PRN    Vital Signs Last 24 Hrs  T(C): 36.7 (14 Jan 2022 05:36), Max: 36.9 (13 Jan 2022 13:15)  T(F): 98.1 (14 Jan 2022 05:36), Max: 98.4 (13 Jan 2022 13:15)  HR: 75 (14 Jan 2022 05:36) (75 - 94)  BP: 120/76 (14 Jan 2022 05:36) (120/76 - 152/80)  BP(mean): --  RR: 18 (14 Jan 2022 05:36) (18 - 18)  SpO2: 97% (14 Jan 2022 05:36) (95% - 98%)    PHYSICAL EXAM  General: adult in NAD  HEENT: clear oropharynx, +mild tenderness  around eyebrows   CV: normal S1/S2 RRR  Lungs: clear to auscultation, no wheezes, no rales  Abdomen: soft non-tender non-distended  Ext: no edema  Skin: no rash  Neuro: alert and oriented X 4  Central Line: RUE PICC  c/d/i    RECENT CULTURES:  Culture - Blood (12.29.21 @ 21:56)    Specimen Source: .Blood Blood-Peripheral    Culture Results:   No Growth Final    Culture - Urine (12.30.21 @ 00:57)    Specimen Source: Clean Catch Clean Catch (Midstream)    Culture Results:   No growth    LABS:                         7.1    0.62  )-----------( 27       ( 14 Jan 2022 07:12 )             20.2     Mean Cell Volume : 83.8 fl  Mean Cell Hemoglobin : 29.5 pg  Mean Cell Hemoglobin Concentration : 35.1 gm/dL  Auto Neutrophil # : 0.32 K/uL  Auto Lymphocyte # : 0.17 K/uL  Auto Monocyte # : 0.12 K/uL  Auto Eosinophil # : 0.00 K/uL  Auto Basophil # : 0.00 K/uL  Auto Neutrophil % : 51.6 %  Auto Lymphocyte % : 27.4 %  Auto Monocyte % : 19.4 %  Auto Eosinophil % : 0.0 %  Auto Basophil % : 0.0 %      01-14    139  |  103  |  20  ----------------------------<  89  3.9   |  23  |  0.73    Ca    8.9      14 Jan 2022 07:09  Phos  3.6     01-14  Mg     1.6     01-14    TPro  6.0  /  Alb  3.4  /  TBili  1.0  /  DBili  x   /  AST  25  /  ALT  196<H>  /  AlkPhos  65  01-14  Mg 1.6  Phos 3.6  PT/INR - ( 14 Jan 2022 07:25 )   PT: 12.2 sec;   INR: 1.02 ratio    PTT - ( 14 Jan 2022 07:25 )  PTT:23.3 sec    Uric Acid 2.0    RADIOLOGY & ADDITIONAL STUDIES:   CT Sinuses w/ IV Cont (01.09.22 @ 11:32) >  Pansinusitis.

## 2022-01-14 NOTE — PROVIDER CONTACT NOTE (OTHER) - ACTION/TREATMENT ORDERED:
EKG performed, Team 7 Dr. Young came and assessed patient, bolus given, labs drawn, will continue to monitor.
Provider notified and aware, continue to monitor for changes in pt status, continue to monitor for changes at R PICC site, no further interventions at this time
IV Tylenol given, will continue to monitor.
provider notified, IV tylenol ordered. Will continue to monitor temperature curve.
IV Tylenol once and lidocaine 2% mouth rinse four times a day.

## 2022-01-14 NOTE — PROVIDER CONTACT NOTE (OTHER) - BACKGROUND
36 yr old male hx of aplastic anemia. Gum pain is not new. Patient has PRN Tylenol and received a dose at 7:25 AM but no relief and stated at the time he did not want something stronger.
Pt admitted for fever due to unspecified condition. HX of Asthma, redundant prepuce and phimosis.
pt admitted with aplastic anemia
Pt admitted for fever due to unspecified condition. HX of Asthma, redundant prepuce and phimosis.
Pt admitted for fever due to unspecified condition. Pmhx of redundant prepuce and phimosis, asthma, aplastic anemia, and pancytopenia.

## 2022-01-14 NOTE — PROGRESS NOTE ADULT - PROBLEM SELECTOR PLAN 1
Relapsed Aplastic Anemia   s/p hATG, CSA, and promacta with complete remission in 2020, BM bx on 11/9/20 revealed hypocellular marrow.  BMbx 12/2021 showing erythroid predominance, myeloid and erythroid maturation, and markedly decreased megakaryocytes c/w relapse  12/23-   transferred  to [facility] for rATG treatment  Monitor CBC with diff/lytes, transfuse and or replete PRN, Monitor weight, I and O, mouth care  12/26- Started Cyclosporine 300 mg PO Q 12 hrs , 12/27 test dose - no reaction noted  12/28 PICC placed  12/29 Started  Rabbit ATG/prednisone-Reaction to rATG- HELD on 12/30 due to reaction ; Continue promacta  1/3 Resumed  rabbit ATG/prednisone  1/6 completed rATG. PLTs x 1 abg. counts at clint.  1/7  CSA level 133, increased Cyclosporine dose to 350mg BID. re-check levels qTues/Fri 1/8- F/u Cyclosporine level on 1/9 before am dose- 87.  1/10- CSP level- 77, increased CSP to 400 mg BID with f/u level on Wed 1/12 and 1/14.  1/10- Prednisone taper start on 1/12.  1/12 monitor transaminitis and hyperbilirubinemia  Hypokalemia: KCL 20 meq po x3  Thrombocytopenia: PLT x1 Relapsed Aplastic Anemia   s/p hATG, CSA, and promacta with complete remission in 2020, BM bx on 11/9/20 revealed hypocellular marrow.  BMbx 12/2021 showing erythroid predominance, myeloid and erythroid maturation, and markedly decreased megakaryocytes c/w relapse  12/23-   transferred  to [facility] for rATG treatment  Monitor CBC with diff/lytes, transfuse and or replete PRN, Monitor weight, I and O, mouth care  12/26- Started Cyclosporine 300 mg PO Q 12 hrs , 12/27 test dose - no reaction noted  12/28 PICC placed  12/29 Started  Rabbit ATG/prednisone-Reaction to rATG- HELD on 12/30 due to reaction ; Continue promacta  1/3 Resumed  rabbit ATG/prednisone  1/6 completed rATG. PLTs x 1 abg. counts at clint.  1/7  CSA level 133, increased Cyclosporine dose to 350mg BID. re-check levels qTues/Fri  1/8- F/u Cyclosporine level on 1/9 before am dose- 87.  1/10- CSP level- 77, increased CSP to 400 mg BID with f/u level on Wed 1/12 and 1/14.  1/10- Prednisone taper start on 1/12.  1/12 monitor transaminitis and hyperbilirubinemia  1/14-Cyclosporine level- 192, increase Cyclosporine to 475 mg Q 12hrs.

## 2022-01-14 NOTE — PROVIDER CONTACT NOTE (OTHER) - RECOMMENDATIONS
IV Tylenol. Will continue to monitor.
EKG and labs. Will continue to monitor.
Lidocaine mouth rinse.
Notify Provider
notify provider. IV tylenol?

## 2022-01-14 NOTE — PROVIDER CONTACT NOTE (OTHER) - SITUATION
Pt oral temp 102
Blood at insertion site of R PICC line
pt 102.8 temp, c/o lightheadedness and h/a
Patient presents with worsening gum pain that radiates to his head causing a headache. The gum pain, patient states 4/10 pain and the headache is a 2/10 pain.
Pt tachycardic with HR of 114.

## 2022-01-14 NOTE — PROGRESS NOTE ADULT - PROBLEM SELECTOR PLAN 2
Neutropenic, afebrile   continue diflucan, Acyclovir, Mepron.  CT CAP  to look for source of infection- No CT evidence for source of infection in the chest, abdomen or pelvis.  12/24- Staph aureus PCR detected, will start Vanco 1 gm Q 12 hrs stopped 12/26. CT maxillo facial did not reveal any abscess.   1/5 if fever recurs will discontinue Diflucan and start voriconazole as per ID.  1/9- CT sinus with contrast shows pansinusitis, ENT consulted started on Unasyn 3gm Q 6 hrs, d/adriana Cefepime and Zithromax.  1/10- If febrile, switch Diflucan to  Voriconazole.  ID following  Pancx CXR if fever  1/13 switch Diflucan to Caspofungin d/t transaminitis Neutropenic, afebrile   continue diflucan, Acyclovir, Mepron.  CT CAP  to look for source of infection- No CT evidence for source of infection in the chest, abdomen or pelvis.  12/24- Staph aureus PCR detected, will start Vanco 1 gm Q 12 hrs stopped 12/26. CT maxillo facial did not reveal any abscess.   1/5 if fever recurs will discontinue Diflucan and start voriconazole as per ID.  1/9- CT sinus with contrast shows pansinusitis, ENT consulted started on Unasyn 3gm Q 6 hrs, d/adriaan Cefepime and Zithromax.  1/10- If febrile, switch Diflucan to  Voriconazole.  ID following  Pan cx CXR if fever  1/13 switch Diflucan to Caspofungin d/t transaminitis

## 2022-01-15 LAB
ALBUMIN SERPL ELPH-MCNC: 3.7 G/DL — SIGNIFICANT CHANGE UP (ref 3.3–5)
ALP SERPL-CCNC: 68 U/L — SIGNIFICANT CHANGE UP (ref 40–120)
ALT FLD-CCNC: 149 U/L — HIGH (ref 10–45)
ANION GAP SERPL CALC-SCNC: 13 MMOL/L — SIGNIFICANT CHANGE UP (ref 5–17)
APTT BLD: 24.6 SEC — LOW (ref 27.5–35.5)
AST SERPL-CCNC: 14 U/L — SIGNIFICANT CHANGE UP (ref 10–40)
BASOPHILS # BLD AUTO: 0 K/UL — SIGNIFICANT CHANGE UP (ref 0–0.2)
BASOPHILS NFR BLD AUTO: 0 % — SIGNIFICANT CHANGE UP (ref 0–2)
BILIRUB SERPL-MCNC: 1.1 MG/DL — SIGNIFICANT CHANGE UP (ref 0.2–1.2)
BUN SERPL-MCNC: 28 MG/DL — HIGH (ref 7–23)
CALCIUM SERPL-MCNC: 9 MG/DL — SIGNIFICANT CHANGE UP (ref 8.4–10.5)
CHLORIDE SERPL-SCNC: 100 MMOL/L — SIGNIFICANT CHANGE UP (ref 96–108)
CO2 SERPL-SCNC: 25 MMOL/L — SIGNIFICANT CHANGE UP (ref 22–31)
CREAT SERPL-MCNC: 0.83 MG/DL — SIGNIFICANT CHANGE UP (ref 0.5–1.3)
CYCLOSPORINE SER-MCNC: 156 NG/ML — SIGNIFICANT CHANGE UP (ref 150–400)
EOSINOPHIL # BLD AUTO: 0.02 K/UL — SIGNIFICANT CHANGE UP (ref 0–0.5)
EOSINOPHIL NFR BLD AUTO: 4.2 % — SIGNIFICANT CHANGE UP (ref 0–6)
FIBRINOGEN PPP-MCNC: 484 MG/DL — SIGNIFICANT CHANGE UP (ref 290–520)
GLUCOSE SERPL-MCNC: 95 MG/DL — SIGNIFICANT CHANGE UP (ref 70–99)
HCT VFR BLD CALC: 20 % — CRITICAL LOW (ref 39–50)
HGB BLD-MCNC: 7.2 G/DL — LOW (ref 13–17)
INR BLD: 1.01 RATIO — SIGNIFICANT CHANGE UP (ref 0.88–1.16)
LDH SERPL L TO P-CCNC: 194 U/L — SIGNIFICANT CHANGE UP (ref 50–242)
LYMPHOCYTES # BLD AUTO: 0.1 K/UL — LOW (ref 1–3.3)
LYMPHOCYTES # BLD AUTO: 18 % — SIGNIFICANT CHANGE UP (ref 13–44)
MAGNESIUM SERPL-MCNC: 1.7 MG/DL — SIGNIFICANT CHANGE UP (ref 1.6–2.6)
MANUAL SMEAR VERIFICATION: SIGNIFICANT CHANGE UP
MCHC RBC-ENTMCNC: 30 PG — SIGNIFICANT CHANGE UP (ref 27–34)
MCHC RBC-ENTMCNC: 36 GM/DL — SIGNIFICANT CHANGE UP (ref 32–36)
MCV RBC AUTO: 83.3 FL — SIGNIFICANT CHANGE UP (ref 80–100)
MONOCYTES # BLD AUTO: 0.08 K/UL — SIGNIFICANT CHANGE UP (ref 0–0.9)
MONOCYTES NFR BLD AUTO: 15.3 % — HIGH (ref 2–14)
NEUTROPHILS # BLD AUTO: 0.34 K/UL — LOW (ref 1.8–7.4)
NEUTROPHILS NFR BLD AUTO: 62.5 % — SIGNIFICANT CHANGE UP (ref 43–77)
NRBC # BLD: 3 /100 — HIGH (ref 0–0)
PHOSPHATE SERPL-MCNC: 4.5 MG/DL — SIGNIFICANT CHANGE UP (ref 2.5–4.5)
PLAT MORPH BLD: NORMAL — SIGNIFICANT CHANGE UP
PLATELET # BLD AUTO: 17 K/UL — CRITICAL LOW (ref 150–400)
POTASSIUM SERPL-MCNC: 3.9 MMOL/L — SIGNIFICANT CHANGE UP (ref 3.5–5.3)
POTASSIUM SERPL-SCNC: 3.9 MMOL/L — SIGNIFICANT CHANGE UP (ref 3.5–5.3)
PROT SERPL-MCNC: 6.2 G/DL — SIGNIFICANT CHANGE UP (ref 6–8.3)
PROTHROM AB SERPL-ACNC: 12.1 SEC — SIGNIFICANT CHANGE UP (ref 10.6–13.6)
RBC # BLD: 2.4 M/UL — LOW (ref 4.2–5.8)
RBC # FLD: 12.2 % — SIGNIFICANT CHANGE UP (ref 10.3–14.5)
RBC BLD AUTO: SIGNIFICANT CHANGE UP
SARS-COV-2 RNA SPEC QL NAA+PROBE: SIGNIFICANT CHANGE UP
SODIUM SERPL-SCNC: 138 MMOL/L — SIGNIFICANT CHANGE UP (ref 135–145)
TOXIC GRANULES BLD QL SMEAR: PRESENT — SIGNIFICANT CHANGE UP
URATE SERPL-MCNC: 2.7 MG/DL — LOW (ref 3.4–8.8)
WBC # BLD: 0.54 K/UL — CRITICAL LOW (ref 3.8–10.5)
WBC # FLD AUTO: 0.54 K/UL — CRITICAL LOW (ref 3.8–10.5)

## 2022-01-15 PROCEDURE — 99232 SBSQ HOSP IP/OBS MODERATE 35: CPT

## 2022-01-15 RX ADMIN — ELTROMBOPAG OLAMINE 150 MILLIGRAM(S): 50 TABLET, FILM COATED ORAL at 11:40

## 2022-01-15 RX ADMIN — CYCLOSPORINE 475 MILLIGRAM(S): 100 CAPSULE ORAL at 05:33

## 2022-01-15 RX ADMIN — Medication 400 MILLIGRAM(S): at 22:12

## 2022-01-15 RX ADMIN — Medication 15 MILLILITER(S): at 18:09

## 2022-01-15 RX ADMIN — Medication 60 MILLIGRAM(S): at 05:32

## 2022-01-15 RX ADMIN — LORATADINE 10 MILLIGRAM(S): 10 TABLET ORAL at 11:40

## 2022-01-15 RX ADMIN — ATOVAQUONE 1500 MILLIGRAM(S): 750 SUSPENSION ORAL at 11:40

## 2022-01-15 RX ADMIN — FAMOTIDINE 20 MILLIGRAM(S): 10 INJECTION INTRAVENOUS at 11:40

## 2022-01-15 RX ADMIN — CHLORHEXIDINE GLUCONATE 1 APPLICATION(S): 213 SOLUTION TOPICAL at 08:30

## 2022-01-15 RX ADMIN — AMPICILLIN SODIUM AND SULBACTAM SODIUM 200 GRAM(S): 250; 125 INJECTION, POWDER, FOR SUSPENSION INTRAMUSCULAR; INTRAVENOUS at 18:08

## 2022-01-15 RX ADMIN — Medication 15 MILLILITER(S): at 05:32

## 2022-01-15 RX ADMIN — Medication 400 MILLIGRAM(S): at 13:30

## 2022-01-15 RX ADMIN — Medication 1 SPRAY(S): at 18:09

## 2022-01-15 RX ADMIN — Medication 400 MILLIGRAM(S): at 05:34

## 2022-01-15 RX ADMIN — CASPOFUNGIN ACETATE 260 MILLIGRAM(S): 7 INJECTION, POWDER, LYOPHILIZED, FOR SOLUTION INTRAVENOUS at 10:56

## 2022-01-15 RX ADMIN — CYCLOSPORINE 475 MILLIGRAM(S): 100 CAPSULE ORAL at 18:10

## 2022-01-15 RX ADMIN — AMPICILLIN SODIUM AND SULBACTAM SODIUM 200 GRAM(S): 250; 125 INJECTION, POWDER, FOR SUSPENSION INTRAMUSCULAR; INTRAVENOUS at 11:39

## 2022-01-15 RX ADMIN — AMPICILLIN SODIUM AND SULBACTAM SODIUM 200 GRAM(S): 250; 125 INJECTION, POWDER, FOR SUSPENSION INTRAMUSCULAR; INTRAVENOUS at 05:32

## 2022-01-15 NOTE — PROGRESS NOTE ADULT - PROBLEM SELECTOR PLAN 7
ALT- 196, trending down, Diflucan changed to Caspo.  Will monitor closely. ALT- 149, trending down, Diflucan changed to Caspo.  Will monitor closely.

## 2022-01-15 NOTE — PROGRESS NOTE ADULT - PROBLEM SELECTOR PLAN 1
Relapsed Aplastic Anemia   s/p hATG, CSA, and promacta with complete remission in 2020, BM bx on 11/9/20 revealed hypocellular marrow.  BMbx 12/2021 showing erythroid predominance, myeloid and erythroid maturation, and markedly decreased megakaryocytes c/w relapse  12/23-   transferred  to [facility] for rATG treatment  Monitor CBC with diff/lytes, transfuse and or replete PRN, Monitor weight, I and O, mouth care  12/26- Started Cyclosporine 300 mg PO Q 12 hrs , 12/27 test dose - no reaction noted  12/28 PICC placed  12/29 Started  Rabbit ATG/prednisone-Reaction to rATG- HELD on 12/30 due to reaction ; Continue promacta  1/3 Resumed  rabbit ATG/prednisone  1/6 completed rATG. PLTs x 1 abg. counts at clint.  1/7  CSA level 133, increased Cyclosporine dose to 350mg BID. re-check levels qTues/Fri  1/8- F/u Cyclosporine level on 1/9 before am dose- 87.  1/10- CSP level- 77, increased CSP to 400 mg BID with f/u level on Wed 1/12 and 1/14.  1/10- Prednisone taper start on 1/12.  1/12 monitor transaminitis and hyperbilirubinemia  1/14-Cyclosporine level- 192, increase Cyclosporine to 475 mg Q 12hrs. Relapsed Aplastic Anemia   s/p hATG, CSA, and promacta with complete remission in 2020, BM bx on 11/9/20 revealed hypocellular marrow.  BMbx 12/2021 showing erythroid predominance, myeloid and erythroid maturation, and markedly decreased megakaryocytes c/w relapse  12/23-   transferred  to [facility] for rATG treatment  Monitor CBC with diff/lytes, transfuse and or replete PRN, Monitor weight, I and O, mouth care.  12/26- Started Cyclosporine 300 mg PO Q 12 hrs , 12/27 test dose - no reaction noted  12/28 PICC placed  12/29 Started  Rabbit ATG/prednisone-Reaction to rATG- HELD on 12/30 due to reaction ; Continue promacta  1/3 Resumed  rabbit ATG/prednisone  1/6 completed rATG. PLTs x 1 abg. counts at clint.  1/7  CSA level 133, increased Cyclosporine dose to 350mg BID. re-check levels qTues/Fri  1/8- F/u Cyclosporine level on 1/9 before am dose- 87.  1/10- CSP level- 77, increased CSP to 400 mg BID with f/u level on Wed 1/12 and 1/14.  1/10- Prednisone taper start on 1/12.  1/12 monitor transaminitis and hyperbilirubinemia  1/14-Cyclosporine level- 192, increase Cyclosporine to 475 mg Q 12hrs.  1/15- Cyclosporine level - 156, no change in dose.

## 2022-01-15 NOTE — PROGRESS NOTE ADULT - SUBJECTIVE AND OBJECTIVE BOX
Diagnosis:    Protocol/Chemo Regimen:    Day:     Pt endorsed:    Review of Systems:     Pain scale:     Diet:     Allergies    No Known Allergies    Intolerances        ANTIMICROBIALS  acyclovir   Oral Tab/Cap 400 milliGRAM(s) Oral every 8 hours  ampicillin/sulbactam  IVPB      ampicillin/sulbactam  IVPB 3 Gram(s) IV Intermittent every 6 hours  atovaquone  Suspension 1500 milliGRAM(s) Oral daily  caspofungin IVPB      caspofungin IVPB 50 milliGRAM(s) IV Intermittent every 24 hours      HEME/ONC MEDICATIONS  eltrombopag 150 milliGRAM(s) Oral daily      STANDING MEDICATIONS  Biotene Dry Mouth Oral Rinse 15 milliLiter(s) Swish and Spit four times a day  chlorhexidine 2% Cloths 1 Application(s) Topical <User Schedule>  cycloSPORINE  (SandIMMUNE) 475 milliGRAM(s) Oral every 12 hours  famotidine    Tablet 20 milliGRAM(s) Oral daily  fluticasone propionate 50 MICROgram(s)/spray Nasal Spray 1 Spray(s) Both Nostrils two times a day  loratadine 10 milliGRAM(s) Oral daily  methylPREDNISolone sodium succinate IVPB 500 milliGRAM(s) IV Intermittent once  predniSONE   Tablet 60 milliGRAM(s) Oral daily  sodium chloride 0.65% Nasal 1 Spray(s) Both Nostrils four times a day  sodium chloride 0.9%. 1000 milliLiter(s) IV Continuous <Continuous>      PRN MEDICATIONS  acetaminophen     Tablet .. 650 milliGRAM(s) Oral every 6 hours PRN  acetaminophen   IVPB .. 1000 milliGRAM(s) IV Intermittent once PRN  aluminum hydroxide/magnesium hydroxide/simethicone Suspension 30 milliLiter(s) Oral every 4 hours PRN  calcium carbonate    500 mG (Tums) Chewable 1 Tablet(s) Chew every 4 hours PRN  diphenhydrAMINE 25 milliGRAM(s) Oral every 12 hours PRN  diphenhydrAMINE Injectable 25 milliGRAM(s) IV Push once PRN  EPINEPHrine     1 mG/mL Injectable 0.3 milliGRAM(s) IntraMuscular once PRN  hydrocortisone sodium succinate Injectable 50 milliGRAM(s) IV Push every 12 hours PRN  metoclopramide Injectable 10 milliGRAM(s) IV Push every 6 hours PRN  ondansetron Injectable 8 milliGRAM(s) IV Push every 8 hours PRN  oxyCODONE    IR 5 milliGRAM(s) Oral every 6 hours PRN        Vital Signs Last 24 Hrs  T(C): 36.5 (15 Dilan 2022 05:02), Max: 37.2 (14 Jan 2022 20:55)  T(F): 97.7 (15 Dilan 2022 05:02), Max: 98.9 (14 Jan 2022 20:55)  HR: 66 (15 Dilan 2022 05:02) (66 - 100)  BP: 133/83 (15 Dilan 2022 05:02) (133/73 - 160/96)  BP(mean): --  RR: 18 (15 Dilan 2022 05:02) (18 - 18)  SpO2: 96% (15 Dilan 2022 05:02) (95% - 96%)    PHYSICAL EXAM  General: NAD  HEENT: PERRLA, EOMOI, clear oropharynx, anicteric sclera, pink conjunctiva  Neck: supple  CV: (+) S1/S2 RRR  Lungs: clear to auscultation, no wheezes or rales  Abdomen: soft, non-tender, non-distended (+) BS  Ext: no clubbing, cyanosis or edema  Skin: no rashes and no petechiae  Neuro: alert and oriented X 3, no focal deficits  Central Line:     RECENT CULTURES:        LABS:                        7.1    0.62  )-----------( 27       ( 14 Jan 2022 07:12 )             20.2         Mean Cell Volume : 83.8 fl  Mean Cell Hemoglobin : 29.5 pg  Mean Cell Hemoglobin Concentration : 35.1 gm/dL  Auto Neutrophil # : 0.32 K/uL  Auto Lymphocyte # : 0.17 K/uL  Auto Monocyte # : 0.12 K/uL  Auto Eosinophil # : 0.00 K/uL  Auto Basophil # : 0.00 K/uL  Auto Neutrophil % : 51.6 %  Auto Lymphocyte % : 27.4 %  Auto Monocyte % : 19.4 %  Auto Eosinophil % : 0.0 %  Auto Basophil % : 0.0 %      01-14    139  |  103  |  20  ----------------------------<  89  3.9   |  23  |  0.73    Ca    8.9      14 Jan 2022 07:09  Phos  3.6     01-14  Mg     1.6     01-14    TPro  6.0  /  Alb  3.4  /  TBili  1.0  /  DBili  x   /  AST  25  /  ALT  196<H>  /  AlkPhos  65  01-14          PT/INR - ( 14 Jan 2022 07:25 )   PT: 12.2 sec;   INR: 1.02 ratio         PTT - ( 14 Jan 2022 07:25 )  PTT:23.3 sec        RADIOLOGY & ADDITIONAL STUDIES:         Diagnosis: Relapsed aplastic anemia    Protocol/Chemo Regimen:  Rabbit ATG/Cyclosporine/prednisone/Promacta     Day:  Rabbit ATG day  14 (D2 suspended due to reaction; resumed on 1/3)     Pt endorsed: + sinus pain improved     Review of Systems: Denies any chest pain, palpitation, SOB, abdominal pain or dysuria.    Pain scale: Denies                                         Diet: regular Enlive QD    Allergies: No Known Allergies    ANTIMICROBIALS  acyclovir   Oral Tab/Cap 400 milliGRAM(s) Oral every 8 hours  ampicillin/sulbactam  IVPB      ampicillin/sulbactam  IVPB 3 Gram(s) IV Intermittent every 6 hours  atovaquone  Suspension 1500 milliGRAM(s) Oral daily  caspofungin IVPB      caspofungin IVPB 50 milliGRAM(s) IV Intermittent every 24 hours    HEME/ONC MEDICATIONS  eltrombopag 150 milliGRAM(s) Oral daily    STANDING MEDICATIONS  Biotene Dry Mouth Oral Rinse 15 milliLiter(s) Swish and Spit four times a day  chlorhexidine 2% Cloths 1 Application(s) Topical <User Schedule>  cycloSPORINE  (SandIMMUNE) 475 milliGRAM(s) Oral every 12 hours  famotidine    Tablet 20 milliGRAM(s) Oral daily  fluticasone propionate 50 MICROgram(s)/spray Nasal Spray 1 Spray(s) Both Nostrils two times a day  loratadine 10 milliGRAM(s) Oral daily  methylPREDNISolone sodium succinate IVPB 500 milliGRAM(s) IV Intermittent once  predniSONE   Tablet 60 milliGRAM(s) Oral daily  sodium chloride 0.65% Nasal 1 Spray(s) Both Nostrils four times a day  sodium chloride 0.9%. 1000 milliLiter(s) IV Continuous <Continuous>    PRN MEDICATIONS  acetaminophen     Tablet .. 650 milliGRAM(s) Oral every 6 hours PRN  acetaminophen   IVPB .. 1000 milliGRAM(s) IV Intermittent once PRN  aluminum hydroxide/magnesium hydroxide/simethicone Suspension 30 milliLiter(s) Oral every 4 hours PRN  calcium carbonate    500 mG (Tums) Chewable 1 Tablet(s) Chew every 4 hours PRN  diphenhydrAMINE 25 milliGRAM(s) Oral every 12 hours PRN  diphenhydrAMINE Injectable 25 milliGRAM(s) IV Push once PRN  EPINEPHrine     1 mG/mL Injectable 0.3 milliGRAM(s) IntraMuscular once PRN  hydrocortisone sodium succinate Injectable 50 milliGRAM(s) IV Push every 12 hours PRN  metoclopramide Injectable 10 milliGRAM(s) IV Push every 6 hours PRN  ondansetron Injectable 8 milliGRAM(s) IV Push every 8 hours PRN  oxyCODONE    IR 5 milliGRAM(s) Oral every 6 hours PRN    Vital Signs Last 24 Hrs  T(C): 36.5 (15 Dilan 2022 05:02), Max: 37.2 (14 Jan 2022 20:55)  T(F): 97.7 (15 Dilan 2022 05:02), Max: 98.9 (14 Jan 2022 20:55)  HR: 66 (15 Dilan 2022 05:02) (66 - 100)  BP: 133/83 (15 Dilan 2022 05:02) (133/73 - 160/96)  BP(mean): --  RR: 18 (15 Dilan 2022 05:02) (18 - 18)  SpO2: 96% (15 Dilan 2022 05:02) (95% - 96%)    PHYSICAL EXAM  General: adult in NAD  HEENT: clear oropharynx, +mild tenderness  around eyebrows   CV: normal S1/S2 RRR  Lungs: clear to auscultation, no wheezes, no rales  Abdomen: soft non-tender non-distended  Ext: no edema  Skin: no rash  Neuro: alert and oriented X 4  Central Line: RUE PICC  c/d/i    RECENT CULTURES:  Culture - Blood (12.29.21 @ 21:56)    Specimen Source: .Blood Blood-Peripheral    Culture Results:   No Growth Final    LABS:                         7.2    0.54  )-----------( 17       ( 15 Dilan 2022 07:51 )             20.0     Mean Cell Volume : 83.3 fl  Mean Cell Hemoglobin : 30.0 pg  Mean Cell Hemoglobin Concentration : 36.0 gm/dL  Auto Neutrophil # : 0.34 K/uL  Auto Lymphocyte # : 0.10 K/uL  Auto Monocyte # : 0.08 K/uL  Auto Eosinophil # : 0.02 K/uL  Auto Basophil # : 0.00 K/uL  Auto Neutrophil % : 62.5 %  Auto Lymphocyte % : 18.0 %  Auto Monocyte % : 15.3 %  Auto Eosinophil % : 4.2 %  Auto Basophil % : 0.0 %    01-15    138  |  100  |  28<H>  ----------------------------<  95  3.9   |  25  |  0.83    Ca    9.0      15 Dilan 2022 07:51  Phos  4.5     01-15  Mg     1.7     01-15    TPro  6.2  /  Alb  3.7  /  TBili  1.1  /  DBili  x   /  AST  14  /  ALT  149<H>  /  AlkPhos  68  01-15  Mg 1.7  Phos 4.5  PT/INR - ( 15 Dilan 2022 07:51 )   PT: 12.1 sec;   INR: 1.01 ratio    PTT - ( 15 Dilan 2022 07:51 )  PTT:24.6 sec    Uric Acid 2.7    RADIOLOGY & ADDITIONAL STUDIES:  CT Sinuses w/ IV Cont (01.09.22 @ 11:32) >   Pansinusitis.

## 2022-01-15 NOTE — PROGRESS NOTE ADULT - PROBLEM SELECTOR PROBLEM 5
HISTORY OF PRESENT ILLNESS:  Mr. Castano is a 71-year-old gentleman who was   recently discharged from the hospital 3 days ago, he said he felt well after   discharge from the hospital, was tolerating ambulation fairly well, had the   sudden onset of shortness of breath.  When his fiancee took his pulse, it was 35   beats per minute.  He was directed to come back to the Emergency Room for   further evaluation and treatment.  In the Emergency Room, he was noted to have   sinus bradycardia, was admitted to the Intensive Care Unit for further   evaluation.  Note that he has paroxysmal atrial flutter, was recently on   amiodarone.  The amiodarone was discontinued and he was placed on Multaq and   progressively increasing doses of carvedilol.  Most recently, he has been on   carvedilol 25 mg twice a day.  Amlodipine was reintroduced for control of his   blood pressure.  Recently, the amiodarone, the Jardiance was discontinued,   because he was noted to have azotemia.  After hydration and discontinuation of   Lasix and Jardiance, the renal function returned to his normal baseline level.    He had complained last week of wheezing and more shortness of breath.  He was   seen by Dr. Sam Perez and placed on Spiriva.  Pulmonary function studies   were performed and I do not have access to that report.  For the past history,   etc., refer to the notes of the recent admissions.    PHYSICAL EXAMINATION:  GENERAL:  Reveals a heavyset man, in no acute distress.  VITAL SIGNS:  Blood pressure of 170/90 and a pulse of 60 beats per minute.  HEENT:  The sclerae are nonicteric.  The conjunctivae are pink.  The ENT exam is   unremarkable.  NECK:  Supple.  There is no jugular venous distention.  The carotid upstroke is   brisk.  There are no carotid bruits.  CHEST:  Clear.  HEART:  Size is grossly normal.  S1 and S2 are normal.  There is no audible   murmur or gallop.  ABDOMEN:  Soft and nontender.  The bowel sounds are  normal.  EXTREMITIES:  There is no clubbing or cyanosis.  There is trace ankle edema.  NEUROLOGIC:  Grossly, the neurological exam is intact.    IMPRESSION ON ADMISSION:  1.  Breathlessness with associated profound bradycardia.  2.  Sick sinus syndrome with paroxysmal atrial flutter, paroxysmal atrial   fibrillation and inappropriate sinus bradycardia on beta-blockers.  3.  Uncontrolled hypertension.  4.  Stable coronary artery disease.  5.  Diabetes mellitus.  6.  Morbid obesity.  7.  Recent azotemia, now resolved.  8.  Previous stroke.      SUKHDEV/ED  dd: 05/15/2018 17:40:38 (CDT)  td: 05/16/2018 02:04:45 (CDT)  Doc ID   #[id number]  Job ID #[id number]    CC:    Sinus pain

## 2022-01-15 NOTE — PROGRESS NOTE ADULT - PROBLEM SELECTOR PLAN 2
Neutropenic, afebrile   continue diflucan, Acyclovir, Mepron.  CT CAP  to look for source of infection- No CT evidence for source of infection in the chest, abdomen or pelvis.  12/24- Staph aureus PCR detected, will start Vanco 1 gm Q 12 hrs stopped 12/26. CT maxillo facial did not reveal any abscess.   1/5 if fever recurs will discontinue Diflucan and start voriconazole as per ID.  1/9- CT sinus with contrast shows pansinusitis, ENT consulted started on Unasyn 3gm Q 6 hrs, d/adriana Cefepime and Zithromax.  1/10- If febrile, switch Diflucan to  Voriconazole.  ID following  Pan cx CXR if fever  1/13 switch Diflucan to Caspofungin d/t transaminitis

## 2022-01-15 NOTE — PROGRESS NOTE ADULT - ATTENDING COMMENTS
36M with Aplastic Anemia diagnosed April 2020.  Underwent treatment with equine ATG, Cyclosporine, and Promacta.  Went into remission (BM bx 5/2021 with normal morphology and cellularity).  Promacta stopped May 2021.  Cyclosporin stopped August 2021.  By September, platelets started to drop.  Now with pancytopenia and neutropenic fever.  No obvious focus of infection. On cefepime, diflucan, acyclovir and mepron.  On cyclosporine 300 mg BID (started on 12/26/21), first level on 12/29/21 --197 increased dose to 325mg bid, repeat level  133, increasing CsA to 350 q12hr.   Today is cyclosporine day 19, day 13 rATG    - Feels well today, requiring transfusions daily  - Completed rATG and continues with prednisone and Promacta.  - CT sinuses with pansinutitis, appreciate ENT recs, on unasyn, monitor for improvement  - continue tylenol PRN  - follow CsA trough, adjust as needed, follow Mg, LFTs  - Transaminitis now downtrending, switched to caspofungin 1/13/22 36M with Aplastic Anemia diagnosed April 2020.  Underwent treatment with equine ATG, Cyclosporine, and Promacta.  Went into remission (BM bx 5/2021 with normal morphology and cellularity).  Promacta stopped May 2021.  Cyclosporin stopped August 2021.  By September, platelets started to drop.  Now with pancytopenia and neutropenic fever.  No obvious focus of infection. On cefepime, diflucan, acyclovir and mepron.  On cyclosporine 300 mg BID (started on 12/26/21), first level on 12/29/21 --197 increased dose to 325mg bid, repeat level  133, increasing CsA to 350 q12hr.   Today is cyclosporine day 19, day 13 rATG      - Completed rATG and continues with prednisone and Promacta.  - Feels well today, requiring transfusions less frequently  - CT sinuses with pansinutitis, appreciate ENT recs, on unasyn, continues to improve, pain is significantly less  - follow CsA trough, adjust as needed, follow Mg, LFTs  - Transaminitis improved, switched to caspofungin 1/13/22

## 2022-01-16 LAB
ALBUMIN SERPL ELPH-MCNC: 3.6 G/DL — SIGNIFICANT CHANGE UP (ref 3.3–5)
ALP SERPL-CCNC: 72 U/L — SIGNIFICANT CHANGE UP (ref 40–120)
ALT FLD-CCNC: 114 U/L — HIGH (ref 10–45)
ANION GAP SERPL CALC-SCNC: 11 MMOL/L — SIGNIFICANT CHANGE UP (ref 5–17)
APTT BLD: 21.6 SEC — LOW (ref 27.5–35.5)
AST SERPL-CCNC: 12 U/L — SIGNIFICANT CHANGE UP (ref 10–40)
BASOPHILS # BLD AUTO: 0 K/UL — SIGNIFICANT CHANGE UP (ref 0–0.2)
BASOPHILS NFR BLD AUTO: 0 % — SIGNIFICANT CHANGE UP (ref 0–2)
BILIRUB DIRECT SERPL-MCNC: 0.3 MG/DL — SIGNIFICANT CHANGE UP (ref 0–0.3)
BILIRUB SERPL-MCNC: 1.3 MG/DL — HIGH (ref 0.2–1.2)
BUN SERPL-MCNC: 20 MG/DL — SIGNIFICANT CHANGE UP (ref 7–23)
CALCIUM SERPL-MCNC: 8.8 MG/DL — SIGNIFICANT CHANGE UP (ref 8.4–10.5)
CHLORIDE SERPL-SCNC: 102 MMOL/L — SIGNIFICANT CHANGE UP (ref 96–108)
CO2 SERPL-SCNC: 24 MMOL/L — SIGNIFICANT CHANGE UP (ref 22–31)
CREAT SERPL-MCNC: 0.79 MG/DL — SIGNIFICANT CHANGE UP (ref 0.5–1.3)
CYCLOSPORINE SER-MCNC: 404 NG/ML — HIGH (ref 150–400)
EOSINOPHIL # BLD AUTO: 0 K/UL — SIGNIFICANT CHANGE UP (ref 0–0.5)
EOSINOPHIL NFR BLD AUTO: 0 % — SIGNIFICANT CHANGE UP (ref 0–6)
FIBRINOGEN PPP-MCNC: 474 MG/DL — SIGNIFICANT CHANGE UP (ref 290–520)
GLUCOSE SERPL-MCNC: 94 MG/DL — SIGNIFICANT CHANGE UP (ref 70–99)
HCT VFR BLD CALC: 20.1 % — CRITICAL LOW (ref 39–50)
HGB BLD-MCNC: 7.2 G/DL — LOW (ref 13–17)
INR BLD: 0.94 RATIO — SIGNIFICANT CHANGE UP (ref 0.88–1.16)
LDH SERPL L TO P-CCNC: 198 U/L — SIGNIFICANT CHANGE UP (ref 50–242)
LYMPHOCYTES # BLD AUTO: 0.06 K/UL — LOW (ref 1–3.3)
LYMPHOCYTES # BLD AUTO: 11.1 % — LOW (ref 13–44)
MAGNESIUM SERPL-MCNC: 1.7 MG/DL — SIGNIFICANT CHANGE UP (ref 1.6–2.6)
MANUAL SMEAR VERIFICATION: SIGNIFICANT CHANGE UP
MCHC RBC-ENTMCNC: 30 PG — SIGNIFICANT CHANGE UP (ref 27–34)
MCHC RBC-ENTMCNC: 35.8 GM/DL — SIGNIFICANT CHANGE UP (ref 32–36)
MCV RBC AUTO: 83.8 FL — SIGNIFICANT CHANGE UP (ref 80–100)
MONOCYTES # BLD AUTO: 0.06 K/UL — SIGNIFICANT CHANGE UP (ref 0–0.9)
MONOCYTES NFR BLD AUTO: 11.1 % — SIGNIFICANT CHANGE UP (ref 2–14)
NEUTROPHILS # BLD AUTO: 0.4 K/UL — LOW (ref 1.8–7.4)
NEUTROPHILS NFR BLD AUTO: 66.7 % — SIGNIFICANT CHANGE UP (ref 43–77)
NEUTS BAND # BLD: 11.1 % — HIGH (ref 0–8)
PHOSPHATE SERPL-MCNC: 4.7 MG/DL — HIGH (ref 2.5–4.5)
PLAT MORPH BLD: NORMAL — SIGNIFICANT CHANGE UP
PLATELET # BLD AUTO: 13 K/UL — CRITICAL LOW (ref 150–400)
POTASSIUM SERPL-MCNC: 3.8 MMOL/L — SIGNIFICANT CHANGE UP (ref 3.5–5.3)
POTASSIUM SERPL-SCNC: 3.8 MMOL/L — SIGNIFICANT CHANGE UP (ref 3.5–5.3)
PROT SERPL-MCNC: 6.1 G/DL — SIGNIFICANT CHANGE UP (ref 6–8.3)
PROTHROM AB SERPL-ACNC: 11.3 SEC — SIGNIFICANT CHANGE UP (ref 10.6–13.6)
RBC # BLD: 2.4 M/UL — LOW (ref 4.2–5.8)
RBC # FLD: 12.3 % — SIGNIFICANT CHANGE UP (ref 10.3–14.5)
RBC BLD AUTO: SIGNIFICANT CHANGE UP
SODIUM SERPL-SCNC: 137 MMOL/L — SIGNIFICANT CHANGE UP (ref 135–145)
URATE SERPL-MCNC: 2.5 MG/DL — LOW (ref 3.4–8.8)
WBC # BLD: 0.51 K/UL — CRITICAL LOW (ref 3.8–10.5)
WBC # FLD AUTO: 0.51 K/UL — CRITICAL LOW (ref 3.8–10.5)

## 2022-01-16 PROCEDURE — 99232 SBSQ HOSP IP/OBS MODERATE 35: CPT

## 2022-01-16 RX ORDER — OXYCODONE HYDROCHLORIDE 5 MG/1
5 TABLET ORAL EVERY 6 HOURS
Refills: 0 | Status: DISCONTINUED | OUTPATIENT
Start: 2022-01-16 | End: 2022-01-20

## 2022-01-16 RX ORDER — CALCIUM ACETATE 667 MG
667 TABLET ORAL
Refills: 0 | Status: COMPLETED | OUTPATIENT
Start: 2022-01-16 | End: 2022-01-18

## 2022-01-16 RX ORDER — CYCLOSPORINE 100 MG/1
450 CAPSULE ORAL EVERY 12 HOURS
Refills: 0 | Status: DISCONTINUED | OUTPATIENT
Start: 2022-01-16 | End: 2022-01-18

## 2022-01-16 RX ADMIN — Medication 650 MILLIGRAM(S): at 18:46

## 2022-01-16 RX ADMIN — ATOVAQUONE 1500 MILLIGRAM(S): 750 SUSPENSION ORAL at 11:54

## 2022-01-16 RX ADMIN — Medication 1 SPRAY(S): at 05:48

## 2022-01-16 RX ADMIN — CHLORHEXIDINE GLUCONATE 1 APPLICATION(S): 213 SOLUTION TOPICAL at 11:53

## 2022-01-16 RX ADMIN — Medication 15 MILLILITER(S): at 17:40

## 2022-01-16 RX ADMIN — Medication 667 MILLIGRAM(S): at 22:54

## 2022-01-16 RX ADMIN — AMPICILLIN SODIUM AND SULBACTAM SODIUM 200 GRAM(S): 250; 125 INJECTION, POWDER, FOR SUSPENSION INTRAMUSCULAR; INTRAVENOUS at 00:20

## 2022-01-16 RX ADMIN — ELTROMBOPAG OLAMINE 150 MILLIGRAM(S): 50 TABLET, FILM COATED ORAL at 11:58

## 2022-01-16 RX ADMIN — SODIUM CHLORIDE 50 MILLILITER(S): 9 INJECTION INTRAMUSCULAR; INTRAVENOUS; SUBCUTANEOUS at 22:55

## 2022-01-16 RX ADMIN — LORATADINE 10 MILLIGRAM(S): 10 TABLET ORAL at 11:54

## 2022-01-16 RX ADMIN — AMPICILLIN SODIUM AND SULBACTAM SODIUM 200 GRAM(S): 250; 125 INJECTION, POWDER, FOR SUSPENSION INTRAMUSCULAR; INTRAVENOUS at 11:57

## 2022-01-16 RX ADMIN — Medication 1 SPRAY(S): at 17:41

## 2022-01-16 RX ADMIN — Medication 60 MILLIGRAM(S): at 05:47

## 2022-01-16 RX ADMIN — AMPICILLIN SODIUM AND SULBACTAM SODIUM 200 GRAM(S): 250; 125 INJECTION, POWDER, FOR SUSPENSION INTRAMUSCULAR; INTRAVENOUS at 06:02

## 2022-01-16 RX ADMIN — Medication 15 MILLILITER(S): at 00:21

## 2022-01-16 RX ADMIN — Medication 667 MILLIGRAM(S): at 11:56

## 2022-01-16 RX ADMIN — Medication 667 MILLIGRAM(S): at 17:45

## 2022-01-16 RX ADMIN — Medication 400 MILLIGRAM(S): at 22:54

## 2022-01-16 RX ADMIN — Medication 400 MILLIGRAM(S): at 16:18

## 2022-01-16 RX ADMIN — AMPICILLIN SODIUM AND SULBACTAM SODIUM 200 GRAM(S): 250; 125 INJECTION, POWDER, FOR SUSPENSION INTRAMUSCULAR; INTRAVENOUS at 23:16

## 2022-01-16 RX ADMIN — AMPICILLIN SODIUM AND SULBACTAM SODIUM 200 GRAM(S): 250; 125 INJECTION, POWDER, FOR SUSPENSION INTRAMUSCULAR; INTRAVENOUS at 17:44

## 2022-01-16 RX ADMIN — Medication 1 SPRAY(S): at 23:16

## 2022-01-16 RX ADMIN — Medication 400 MILLIGRAM(S): at 05:47

## 2022-01-16 RX ADMIN — FAMOTIDINE 20 MILLIGRAM(S): 10 INJECTION INTRAVENOUS at 11:54

## 2022-01-16 RX ADMIN — CYCLOSPORINE 475 MILLIGRAM(S): 100 CAPSULE ORAL at 05:47

## 2022-01-16 RX ADMIN — Medication 15 MILLILITER(S): at 11:53

## 2022-01-16 RX ADMIN — Medication 1 SPRAY(S): at 00:20

## 2022-01-16 RX ADMIN — SODIUM CHLORIDE 50 MILLILITER(S): 9 INJECTION INTRAMUSCULAR; INTRAVENOUS; SUBCUTANEOUS at 05:48

## 2022-01-16 RX ADMIN — Medication 15 MILLILITER(S): at 23:15

## 2022-01-16 RX ADMIN — Medication 15 MILLILITER(S): at 06:01

## 2022-01-16 RX ADMIN — CYCLOSPORINE 450 MILLIGRAM(S): 100 CAPSULE ORAL at 17:40

## 2022-01-16 RX ADMIN — CASPOFUNGIN ACETATE 260 MILLIGRAM(S): 7 INJECTION, POWDER, LYOPHILIZED, FOR SOLUTION INTRAVENOUS at 16:18

## 2022-01-16 NOTE — PROGRESS NOTE ADULT - PROBLEM SELECTOR PLAN 1
Relapsed Aplastic Anemia   s/p hATG, CSA, and promacta with complete remission in 2020, BM bx on 11/9/20 revealed hypocellular marrow.  BMbx 12/2021 showing erythroid predominance, myeloid and erythroid maturation, and markedly decreased megakaryocytes c/w relapse  12/23-   transferred  to [facility] for rATG treatment  Monitor CBC with diff/lytes, transfuse and or replete PRN, Monitor weight, I and O, mouth care.  12/26- Started Cyclosporine 300 mg PO Q 12 hrs , 12/27 test dose - no reaction noted  12/28 PICC placed  12/29 Started  Rabbit ATG/prednisone-Reaction to rATG- HELD on 12/30 due to reaction ; Continue promacta  1/3 Resumed  rabbit ATG/prednisone  1/6 completed rATG. PLTs x 1 abg. counts at clint.  1/7  CSA level 133, increased Cyclosporine dose to 350mg BID. re-check levels qTues/Fri  1/8- F/u Cyclosporine level on 1/9 before am dose- 87.  1/10- CSP level- 77, increased CSP to 400 mg BID with f/u level on Wed 1/12 and 1/14.  1/10- Prednisone taper start on 1/12.  1/12 monitor transaminitis and hyperbilirubinemia  1/14-Cyclosporine level- 192, increase Cyclosporine to 475 mg Q 12hrs.  1/15- Cyclosporine level - 156, no change in dose. Relapsed Aplastic Anemia   s/p hATG, CSA, and promacta with complete remission in 2020, BM bx on 11/9/20 revealed hypocellular marrow.  BMbx 12/2021 showing erythroid predominance, myeloid and erythroid maturation, and markedly decreased megakaryocytes c/w relapse  12/23-   transferred  to [facility] for rATG treatment  Monitor CBC with diff/lytes, transfuse and or replete PRN, Monitor weight, I and O, mouth care.  12/26- Started Cyclosporine 300 mg PO Q 12 hrs , 12/27 test dose - no reaction noted  12/28 PICC placed  12/29 Started  Rabbit ATG/prednisone-Reaction to rATG- HELD on 12/30 due to reaction ; Continue promacta  1/3 Resumed  rabbit ATG/prednisone  1/6 completed rATG. PLTs x 1 abg. counts at clint.  1/7  CSA level 133, increased Cyclosporine dose to 350mg BID. re-check levels qTues/Fri  1/8- F/u Cyclosporine level on 1/9 before am dose- 87.  1/10- CSP level- 77, increased CSP to 400 mg BID with f/u level on Wed 1/12 and 1/14.  1/10- Prednisone taper start on 1/12.  1/12 monitor transaminitis and hyperbilirubinemia  1/14-Cyclosporine level- 192, increase Cyclosporine to 475 mg Q 12hrs.  1/15- Cyclosporine level - 156, no change in dose.  1/16- Cyclosporine level - 404, will lower cyclosporine dose to 450 mg PO Q q12 hrs with f/u level in am.

## 2022-01-16 NOTE — PROGRESS NOTE ADULT - ATTENDING COMMENTS
36M with Aplastic Anemia diagnosed April 2020.  Underwent treatment with equine ATG, Cyclosporine, and Promacta.  Went into remission (BM bx 5/2021 with normal morphology and cellularity).  Promacta stopped May 2021.  Cyclosporin stopped August 2021.  By September, platelets started to drop.  Now with pancytopenia and neutropenic fever.  No obvious focus of infection. On cefepime, diflucan, acyclovir and mepron.  On cyclosporine 300 mg BID (started on 12/26/21), first level on 12/29/21 --197 increased dose to 325mg bid, repeat level  133, increasing CsA to 350 q12hr.   Today is cyclosporine day 19, day 13 rATG      - Completed rATG and continues with prednisone and Promacta.  - Feels well today, requiring transfusions less frequently  - CT sinuses with pansinutitis, appreciate ENT recs, on unasyn, continues to improve, pain is significantly less  - follow CsA trough, adjust as needed, follow Mg, LFTs  - Transaminitis improved, switched to caspofungin 1/13/22 36M with Aplastic Anemia diagnosed April 2020.  Underwent treatment with equine ATG, Cyclosporine, and Promacta.  Went into remission (BM bx 5/2021 with normal morphology and cellularity).  Promacta stopped May 2021.  Cyclosporin stopped August 2021.  By September, platelets started to drop.  Now with pancytopenia and neutropenic fever.  No obvious focus of infection. On cefepime, diflucan, acyclovir and mepron.  On cyclosporine 300 mg BID (started on 12/26/21), first level on 12/29/21 --197 increased dose to 325mg bid, repeat level  133, increasing CsA to 350 q12hr.   Today is cyclosporine day 21, day 15 rATG      - Completed rATG and continues with prednisone and Promacta.  - Feels well today, requiring transfusions less frequently- last plt 1/13  - CT sinuses with pansinutitis, appreciate ENT recs, on unasyn, continues to improve, pain is significantly less  - csa changed to 450q12, adjust as needed, follow Mg, LFTs  - Transaminitis improved, switched to caspofungin 1/13/22  -if infection improved, consider dc planning on Tuesday/wednesday

## 2022-01-16 NOTE — PROGRESS NOTE ADULT - SUBJECTIVE AND OBJECTIVE BOX
Diagnosis:    Protocol/Chemo Regimen:    Day:     Pt endorsed:    Review of Systems:     Pain scale:     Diet:     Allergies    No Known Allergies    Intolerances        ANTIMICROBIALS  acyclovir   Oral Tab/Cap 400 milliGRAM(s) Oral every 8 hours  ampicillin/sulbactam  IVPB      ampicillin/sulbactam  IVPB 3 Gram(s) IV Intermittent every 6 hours  atovaquone  Suspension 1500 milliGRAM(s) Oral daily  caspofungin IVPB      caspofungin IVPB 50 milliGRAM(s) IV Intermittent every 24 hours      HEME/ONC MEDICATIONS  eltrombopag 150 milliGRAM(s) Oral daily      STANDING MEDICATIONS  Biotene Dry Mouth Oral Rinse 15 milliLiter(s) Swish and Spit four times a day  chlorhexidine 2% Cloths 1 Application(s) Topical <User Schedule>  cycloSPORINE  (SandIMMUNE) 475 milliGRAM(s) Oral every 12 hours  famotidine    Tablet 20 milliGRAM(s) Oral daily  fluticasone propionate 50 MICROgram(s)/spray Nasal Spray 1 Spray(s) Both Nostrils two times a day  loratadine 10 milliGRAM(s) Oral daily  methylPREDNISolone sodium succinate IVPB 500 milliGRAM(s) IV Intermittent once  predniSONE   Tablet 60 milliGRAM(s) Oral daily  sodium chloride 0.65% Nasal 1 Spray(s) Both Nostrils four times a day  sodium chloride 0.9%. 1000 milliLiter(s) IV Continuous <Continuous>      PRN MEDICATIONS  acetaminophen     Tablet .. 650 milliGRAM(s) Oral every 6 hours PRN  acetaminophen   IVPB .. 1000 milliGRAM(s) IV Intermittent once PRN  aluminum hydroxide/magnesium hydroxide/simethicone Suspension 30 milliLiter(s) Oral every 4 hours PRN  calcium carbonate    500 mG (Tums) Chewable 1 Tablet(s) Chew every 4 hours PRN  diphenhydrAMINE 25 milliGRAM(s) Oral every 12 hours PRN  diphenhydrAMINE Injectable 25 milliGRAM(s) IV Push once PRN  EPINEPHrine     1 mG/mL Injectable 0.3 milliGRAM(s) IntraMuscular once PRN  hydrocortisone sodium succinate Injectable 50 milliGRAM(s) IV Push every 12 hours PRN  metoclopramide Injectable 10 milliGRAM(s) IV Push every 6 hours PRN  ondansetron Injectable 8 milliGRAM(s) IV Push every 8 hours PRN  oxyCODONE    IR 5 milliGRAM(s) Oral every 6 hours PRN        Vital Signs Last 24 Hrs  T(C): 36.7 (16 Jan 2022 05:27), Max: 37.1 (15 Dilan 2022 16:35)  T(F): 98.1 (16 Jan 2022 05:27), Max: 98.7 (15 Dilan 2022 16:35)  HR: 87 (16 Jan 2022 05:27) (78 - 87)  BP: 134/84 (16 Jan 2022 05:27) (130/73 - 150/82)  BP(mean): --  RR: 18 (16 Jan 2022 05:27) (18 - 18)  SpO2: 98% (16 Jan 2022 05:27) (94% - 98%)    PHYSICAL EXAM  General: NAD  HEENT: PERRLA, EOMOI, clear oropharynx, anicteric sclera, pink conjunctiva  Neck: supple  CV: (+) S1/S2 RRR  Lungs: clear to auscultation, no wheezes or rales  Abdomen: soft, non-tender, non-distended (+) BS  Ext: no clubbing, cyanosis or edema  Skin: no rashes and no petechiae  Neuro: alert and oriented X 3, no focal deficits  Central Line:     RECENT CULTURES:        LABS:                        7.2    0.54  )-----------( 17       ( 15 Dilan 2022 07:51 )             20.0         Mean Cell Volume : 83.3 fl  Mean Cell Hemoglobin : 30.0 pg  Mean Cell Hemoglobin Concentration : 36.0 gm/dL  Auto Neutrophil # : 0.34 K/uL  Auto Lymphocyte # : 0.10 K/uL  Auto Monocyte # : 0.08 K/uL  Auto Eosinophil # : 0.02 K/uL  Auto Basophil # : 0.00 K/uL  Auto Neutrophil % : 62.5 %  Auto Lymphocyte % : 18.0 %  Auto Monocyte % : 15.3 %  Auto Eosinophil % : 4.2 %  Auto Basophil % : 0.0 %      01-15    138  |  100  |  28<H>  ----------------------------<  95  3.9   |  25  |  0.83    Ca    9.0      15 Dilan 2022 07:51  Phos  4.5     01-15  Mg     1.7     01-15    TPro  6.2  /  Alb  3.7  /  TBili  1.1  /  DBili  x   /  AST  14  /  ALT  149<H>  /  AlkPhos  68  01-15          PT/INR - ( 16 Jan 2022 07:41 )   PT: 11.3 sec;   INR: 0.94 ratio         PTT - ( 16 Jan 2022 07:41 )  PTT:21.6 sec        RADIOLOGY & ADDITIONAL STUDIES:         Diagnosis: Relapsed aplastic anemia    Protocol/Chemo Regimen:  Rabbit ATG/Cyclosporine/prednisone/Promacta     Day:  Rabbit ATG day  15 (D2 suspended due to reaction; resumed on 1/3)     Pt endorsed: + sinus pain improved continue to feel sinus pressure    Review of Systems: Denies any chest pain, palpitation, SOB, abdominal pain or dysuria.    Pain scale: Denies                                         Diet: regular Enlive QD    Allergies: No Known Allergies    ANTIMICROBIALS  acyclovir   Oral Tab/Cap 400 milliGRAM(s) Oral every 8 hours  ampicillin/sulbactam  IVPB      ampicillin/sulbactam  IVPB 3 Gram(s) IV Intermittent every 6 hours  atovaquone  Suspension 1500 milliGRAM(s) Oral daily  caspofungin IVPB      caspofungin IVPB 50 milliGRAM(s) IV Intermittent every 24 hours    HEME/ONC MEDICATIONS  eltrombopag 150 milliGRAM(s) Oral daily    STANDING MEDICATIONS  Biotene Dry Mouth Oral Rinse 15 milliLiter(s) Swish and Spit four times a day  chlorhexidine 2% Cloths 1 Application(s) Topical <User Schedule>  cycloSPORINE  (SandIMMUNE) 475 milliGRAM(s) Oral every 12 hours  famotidine    Tablet 20 milliGRAM(s) Oral daily  fluticasone propionate 50 MICROgram(s)/spray Nasal Spray 1 Spray(s) Both Nostrils two times a day  loratadine 10 milliGRAM(s) Oral daily  methylPREDNISolone sodium succinate IVPB 500 milliGRAM(s) IV Intermittent once  predniSONE   Tablet 60 milliGRAM(s) Oral daily  sodium chloride 0.65% Nasal 1 Spray(s) Both Nostrils four times a day  sodium chloride 0.9%. 1000 milliLiter(s) IV Continuous <Continuous>    PRN MEDICATIONS  acetaminophen     Tablet .. 650 milliGRAM(s) Oral every 6 hours PRN  acetaminophen   IVPB .. 1000 milliGRAM(s) IV Intermittent once PRN  aluminum hydroxide/magnesium hydroxide/simethicone Suspension 30 milliLiter(s) Oral every 4 hours PRN  calcium carbonate    500 mG (Tums) Chewable 1 Tablet(s) Chew every 4 hours PRN  diphenhydrAMINE 25 milliGRAM(s) Oral every 12 hours PRN  diphenhydrAMINE Injectable 25 milliGRAM(s) IV Push once PRN  EPINEPHrine     1 mG/mL Injectable 0.3 milliGRAM(s) IntraMuscular once PRN  hydrocortisone sodium succinate Injectable 50 milliGRAM(s) IV Push every 12 hours PRN  metoclopramide Injectable 10 milliGRAM(s) IV Push every 6 hours PRN  ondansetron Injectable 8 milliGRAM(s) IV Push every 8 hours PRN  oxyCODONE    IR 5 milliGRAM(s) Oral every 6 hours PRN    Vital Signs Last 24 Hrs  T(C): 36.7 (16 Jan 2022 05:27), Max: 37.1 (15 Dilan 2022 16:35)  T(F): 98.1 (16 Jan 2022 05:27), Max: 98.7 (15 Dilan 2022 16:35)  HR: 87 (16 Jan 2022 05:27) (78 - 87)  BP: 134/84 (16 Jan 2022 05:27) (130/73 - 150/82)  BP(mean): --  RR: 18 (16 Jan 2022 05:27) (18 - 18)  SpO2: 98% (16 Jan 2022 05:27) (94% - 98%)    PHYSICAL EXAM  General: adult in NAD  HEENT: clear oropharynx, +mild tenderness  around eyebrows   CV: normal S1/S2 RRR  Lungs: clear to auscultation, no wheezes, no rales  Abdomen: soft non-tender non-distended  Ext: no edema  Skin: no rash  Neuro: alert and oriented X 4  Central Line: RUE PICC  c/d/i    RECENT CULTURES:  Culture - Blood (12.29.21 @ 21:56)    Specimen Source: .Blood Blood-Peripheral    Culture Results:   No Growth Final    Culture - Urine (12.30.21 @ 00:57)    Specimen Source: Clean Catch Clean Catch (Midstream)    Culture Results:   No growth    LABS:                        7.2    0.51  )-----------( 13       ( 16 Jan 2022 07:40 )             20.1     Mean Cell Volume : 83.8 fl  Mean Cell Hemoglobin : 30.0 pg  Mean Cell Hemoglobin Concentration : 35.8 gm/dL  Auto Neutrophil # : 0.40 K/uL  Auto Lymphocyte # : 0.06 K/uL  Auto Monocyte # : 0.06 K/uL  Auto Eosinophil # : 0.00 K/uL  Auto Basophil # : 0.00 K/uL  Auto Neutrophil % : 66.7 %  Auto Lymphocyte % : 11.1 %  Auto Monocyte % : 11.1 %  Auto Eosinophil % : 0.0 %  Auto Basophil % : 0.0 %    01-16    137  |  102  |  20  ----------------------------<  94  3.8   |  24  |  0.79    Ca    8.8      16 Jan 2022 07:31  Phos  4.7     01-16  Mg     1.7     01-16    TPro  6.1  /  Alb  3.6  /  TBili  1.3<H>  /  DBili  0.3  /  AST  12  /  ALT  114<H>  /  AlkPhos  72  01-16  Mg 1.7  Phos 4.7  PT/INR - ( 16 Jan 2022 07:41 )   PT: 11.3 sec;   INR: 0.94 ratio    PTT - ( 16 Jan 2022 07:41 )  PTT:21.6 sec    Uric Acid 2.5    RADIOLOGY & ADDITIONAL STUDIES:  CT Sinuses w/ IV Cont (01.09.22 @ 11:32) >  Pansinusitis.         Diagnosis: Relapsed aplastic anemia    Protocol/Chemo Regimen:  Rabbit ATG/Cyclosporine/prednisone/Promacta     Day:  Rabbit ATG day  15 (D2 suspended due to reaction; resumed on 1/3)     Pt endorsed: + sinus pain improved continue to feel sinus pressure    Review of Systems: Denies any chest pain, palpitation, SOB, abdominal pain or dysuria.    Pain scale: Denies                                         Diet: regular Enlive QD    Allergies: No Known Allergies    ANTIMICROBIALS  acyclovir   Oral Tab/Cap 400 milliGRAM(s) Oral every 8 hours  ampicillin/sulbactam  IVPB      ampicillin/sulbactam  IVPB 3 Gram(s) IV Intermittent every 6 hours  atovaquone  Suspension 1500 milliGRAM(s) Oral daily  caspofungin IVPB      caspofungin IVPB 50 milliGRAM(s) IV Intermittent every 24 hours    HEME/ONC MEDICATIONS  eltrombopag 150 milliGRAM(s) Oral daily    STANDING MEDICATIONS  Biotene Dry Mouth Oral Rinse 15 milliLiter(s) Swish and Spit four times a day  chlorhexidine 2% Cloths 1 Application(s) Topical <User Schedule>  cycloSPORINE  (SandIMMUNE) 475 milliGRAM(s) Oral every 12 hours  famotidine    Tablet 20 milliGRAM(s) Oral daily  fluticasone propionate 50 MICROgram(s)/spray Nasal Spray 1 Spray(s) Both Nostrils two times a day  loratadine 10 milliGRAM(s) Oral daily  methylPREDNISolone sodium succinate IVPB 500 milliGRAM(s) IV Intermittent once  predniSONE   Tablet 60 milliGRAM(s) Oral daily  sodium chloride 0.65% Nasal 1 Spray(s) Both Nostrils four times a day  sodium chloride 0.9%. 1000 milliLiter(s) IV Continuous <Continuous>    PRN MEDICATIONS  acetaminophen     Tablet .. 650 milliGRAM(s) Oral every 6 hours PRN  acetaminophen   IVPB .. 1000 milliGRAM(s) IV Intermittent once PRN  aluminum hydroxide/magnesium hydroxide/simethicone Suspension 30 milliLiter(s) Oral every 4 hours PRN  calcium carbonate    500 mG (Tums) Chewable 1 Tablet(s) Chew every 4 hours PRN  diphenhydrAMINE 25 milliGRAM(s) Oral every 12 hours PRN  diphenhydrAMINE Injectable 25 milliGRAM(s) IV Push once PRN  EPINEPHrine     1 mG/mL Injectable 0.3 milliGRAM(s) IntraMuscular once PRN  hydrocortisone sodium succinate Injectable 50 milliGRAM(s) IV Push every 12 hours PRN  metoclopramide Injectable 10 milliGRAM(s) IV Push every 6 hours PRN  ondansetron Injectable 8 milliGRAM(s) IV Push every 8 hours PRN  oxyCODONE    IR 5 milliGRAM(s) Oral every 6 hours PRN    Vital Signs Last 24 Hrs  T(C): 36.7 (16 Jan 2022 05:27), Max: 37.1 (15 Dilan 2022 16:35)  T(F): 98.1 (16 Jan 2022 05:27), Max: 98.7 (15 Dilan 2022 16:35)  HR: 87 (16 Jan 2022 05:27) (78 - 87)  BP: 134/84 (16 Jan 2022 05:27) (130/73 - 150/82)  BP(mean): --  RR: 18 (16 Jan 2022 05:27) (18 - 18)  SpO2: 98% (16 Jan 2022 05:27) (94% - 98%)    PHYSICAL EXAM  General: adult in NAD  HEENT: clear oropharynx, +mild tenderness  around eyebrows   CV: normal S1/S2 RRR  Lungs: clear to auscultation, no wheezes, no rales  Abdomen: soft non-tender non-distended  Ext: no edema in BLE  Skin: no rash  Neuro: alert and oriented X 4  Central Line: RUE PICC  c/d/i    RECENT CULTURES:  Culture - Blood (12.29.21 @ 21:56)    Specimen Source: .Blood Blood-Peripheral    Culture Results:   No Growth Final    Culture - Urine (12.30.21 @ 00:57)    Specimen Source: Clean Catch Clean Catch (Midstream)    Culture Results:   No growth    LABS:                        7.2    0.51  )-----------( 13       ( 16 Jan 2022 07:40 )             20.1     Mean Cell Volume : 83.8 fl  Mean Cell Hemoglobin : 30.0 pg  Mean Cell Hemoglobin Concentration : 35.8 gm/dL  Auto Neutrophil # : 0.40 K/uL  Auto Lymphocyte # : 0.06 K/uL  Auto Monocyte # : 0.06 K/uL  Auto Eosinophil # : 0.00 K/uL  Auto Basophil # : 0.00 K/uL  Auto Neutrophil % : 66.7 %  Auto Lymphocyte % : 11.1 %  Auto Monocyte % : 11.1 %  Auto Eosinophil % : 0.0 %  Auto Basophil % : 0.0 %    01-16    137  |  102  |  20  ----------------------------<  94  3.8   |  24  |  0.79    Ca    8.8      16 Jan 2022 07:31  Phos  4.7     01-16  Mg     1.7     01-16    TPro  6.1  /  Alb  3.6  /  TBili  1.3<H>  /  DBili  0.3  /  AST  12  /  ALT  114<H>  /  AlkPhos  72  01-16  Mg 1.7  Phos 4.7  PT/INR - ( 16 Jan 2022 07:41 )   PT: 11.3 sec;   INR: 0.94 ratio    PTT - ( 16 Jan 2022 07:41 )  PTT:21.6 sec    Uric Acid 2.5    RADIOLOGY & ADDITIONAL STUDIES:  CT Sinuses w/ IV Cont (01.09.22 @ 11:32) >  Pansinusitis.

## 2022-01-16 NOTE — PROGRESS NOTE ADULT - PROBLEM SELECTOR PLAN 6
PTT prolonged, s/p Vitamin K 5 mg PO x 3 days (1/8,9,10). ALT- 149, trending down, Diflucan changed to Caspo.  Will monitor closely.

## 2022-01-16 NOTE — PROGRESS NOTE ADULT - PROBLEM SELECTOR PLAN 2
Neutropenic, afebrile   continue diflucan, Acyclovir, Mepron.  CT CAP  to look for source of infection- No CT evidence for source of infection in the chest, abdomen or pelvis.  12/24- Staph aureus PCR detected, will start Vanco 1 gm Q 12 hrs stopped 12/26. CT maxillo facial did not reveal any abscess.   1/5 if fever recurs will discontinue Diflucan and start voriconazole as per ID.  1/9- CT sinus with contrast shows pansinusitis, ENT consulted started on Unasyn 3gm Q 6 hrs, d/adriana Cefepime and Zithromax.  1/10- If febrile, switch Diflucan to  Voriconazole.  ID following  Pan cx CXR if fever  1/13 switch Diflucan to Caspofungin d/t transaminitis Neutropenic, afebrile   continue diflucan, Acyclovir, Mepron.  CT CAP  to look for source of infection- No CT evidence for source of infection in the chest, abdomen or pelvis.  12/24- Staph aureus PCR detected, will start Vanco 1 gm Q 12 hrs stopped 12/26. CT maxillo facial did not reveal any abscess.   1/5 if fever recurs will discontinue Diflucan and start voriconazole as per ID.  1/9- CT sinus with contrast shows pansinusitis, ENT consulted started on Unasyn 3gm Q 6 hrs, d/adriana Cefepime and Zithromax.  ID following  Pan cx CXR if fever  1/13 switch Diflucan to Caspofungin d/t transaminitis

## 2022-01-16 NOTE — PROGRESS NOTE ADULT - ASSESSMENT
Mr. Olivera is a 37 yo M with Aplastic Anemia diagnosed  April 2020, s/p equine ATG, Cyclosporine, and Promacta. Bone marrow biopsy was done on 5/2021 revealed normal morphology and cellularity, eventually Promacta and Cyclosporine was stopped in 5/2021. Then noted his platelets dropping in September 2021.   Now admitted with neutropenic fever found to have pancytopenia secondary to disease condition.  Bone marrow biopsy was repeated on 12/2021 showed decreased megakaryocytes c/w relapsed disease. Rabbit ATG/ prednisone  started on 12/29/21; ATG held starting on 12/30 due to reaction and resumed on 1/3/22. Hospital course complicated by ATG reaction, and neutropenic fever. Pt has pancytopenia secondary to chemotherapy and or disease condition.   Mr. Olivera is a 35 yo M with Aplastic Anemia diagnosed  April 2020, s/p equine ATG, Cyclosporine, and Promacta. Bone marrow biopsy was done on 5/2021 revealed normal morphology and cellularity, eventually Promacta and Cyclosporine was stopped in 5/2021. Then noted his platelets dropping in September 2021.   Now admitted with neutropenic fever found to have pancytopenia secondary to disease condition.  Bone marrow biopsy was repeated on 12/2021 showed decreased megakaryocytes c/w relapsed disease. Rabbit ATG/ prednisone  started on 12/29/21, ATG held starting on 12/30 due to reaction and resumed on 1/3/22. Hospital course complicated by ATG reaction, and neutropenic fever. Pt has pancytopenia secondary to chemotherapy and or disease condition.

## 2022-01-17 LAB
ALBUMIN SERPL ELPH-MCNC: 3.6 G/DL — SIGNIFICANT CHANGE UP (ref 3.3–5)
ALP SERPL-CCNC: 71 U/L — SIGNIFICANT CHANGE UP (ref 40–120)
ALT FLD-CCNC: 85 U/L — HIGH (ref 10–45)
ANION GAP SERPL CALC-SCNC: 13 MMOL/L — SIGNIFICANT CHANGE UP (ref 5–17)
APTT BLD: 23.9 SEC — LOW (ref 27.5–35.5)
AST SERPL-CCNC: 10 U/L — SIGNIFICANT CHANGE UP (ref 10–40)
BASOPHILS # BLD AUTO: 0 K/UL — SIGNIFICANT CHANGE UP (ref 0–0.2)
BASOPHILS NFR BLD AUTO: 0 % — SIGNIFICANT CHANGE UP (ref 0–2)
BILIRUB SERPL-MCNC: 1.2 MG/DL — SIGNIFICANT CHANGE UP (ref 0.2–1.2)
BUN SERPL-MCNC: 21 MG/DL — SIGNIFICANT CHANGE UP (ref 7–23)
CALCIUM SERPL-MCNC: 8.7 MG/DL — SIGNIFICANT CHANGE UP (ref 8.4–10.5)
CHLORIDE SERPL-SCNC: 102 MMOL/L — SIGNIFICANT CHANGE UP (ref 96–108)
CO2 SERPL-SCNC: 23 MMOL/L — SIGNIFICANT CHANGE UP (ref 22–31)
CREAT SERPL-MCNC: 0.8 MG/DL — SIGNIFICANT CHANGE UP (ref 0.5–1.3)
CYCLOSPORINE SER-MCNC: 163 NG/ML — SIGNIFICANT CHANGE UP (ref 150–400)
EOSINOPHIL # BLD AUTO: 0.01 K/UL — SIGNIFICANT CHANGE UP (ref 0–0.5)
EOSINOPHIL NFR BLD AUTO: 1.1 % — SIGNIFICANT CHANGE UP (ref 0–6)
FIBRINOGEN PPP-MCNC: 447 MG/DL — SIGNIFICANT CHANGE UP (ref 290–520)
GLUCOSE SERPL-MCNC: 94 MG/DL — SIGNIFICANT CHANGE UP (ref 70–99)
HCT VFR BLD CALC: 19.7 % — CRITICAL LOW (ref 39–50)
HGB BLD-MCNC: 7.2 G/DL — LOW (ref 13–17)
INR BLD: 1 RATIO — SIGNIFICANT CHANGE UP (ref 0.88–1.16)
LDH SERPL L TO P-CCNC: 180 U/L — SIGNIFICANT CHANGE UP (ref 50–242)
LYMPHOCYTES # BLD AUTO: 0.14 K/UL — LOW (ref 1–3.3)
LYMPHOCYTES # BLD AUTO: 25.5 % — SIGNIFICANT CHANGE UP (ref 13–44)
MAGNESIUM SERPL-MCNC: 1.8 MG/DL — SIGNIFICANT CHANGE UP (ref 1.6–2.6)
MANUAL SMEAR VERIFICATION: SIGNIFICANT CHANGE UP
MCHC RBC-ENTMCNC: 30.4 PG — SIGNIFICANT CHANGE UP (ref 27–34)
MCHC RBC-ENTMCNC: 36.5 GM/DL — HIGH (ref 32–36)
MCV RBC AUTO: 83.1 FL — SIGNIFICANT CHANGE UP (ref 80–100)
MONOCYTES # BLD AUTO: 0.05 K/UL — SIGNIFICANT CHANGE UP (ref 0–0.9)
MONOCYTES NFR BLD AUTO: 8.5 % — SIGNIFICANT CHANGE UP (ref 2–14)
NEUTROPHILS # BLD AUTO: 0.35 K/UL — LOW (ref 1.8–7.4)
NEUTROPHILS NFR BLD AUTO: 63.8 % — SIGNIFICANT CHANGE UP (ref 43–77)
NEUTS BAND # BLD: 1.1 % — SIGNIFICANT CHANGE UP (ref 0–8)
NRBC # BLD: 4 /100 — HIGH (ref 0–0)
PHOSPHATE SERPL-MCNC: 4.8 MG/DL — HIGH (ref 2.5–4.5)
PLAT MORPH BLD: NORMAL — SIGNIFICANT CHANGE UP
PLATELET # BLD AUTO: 9 K/UL — CRITICAL LOW (ref 150–400)
POTASSIUM SERPL-MCNC: 3.9 MMOL/L — SIGNIFICANT CHANGE UP (ref 3.5–5.3)
POTASSIUM SERPL-SCNC: 3.9 MMOL/L — SIGNIFICANT CHANGE UP (ref 3.5–5.3)
PROT SERPL-MCNC: 6.2 G/DL — SIGNIFICANT CHANGE UP (ref 6–8.3)
PROTHROM AB SERPL-ACNC: 12 SEC — SIGNIFICANT CHANGE UP (ref 10.6–13.6)
RBC # BLD: 2.37 M/UL — LOW (ref 4.2–5.8)
RBC # FLD: 12.5 % — SIGNIFICANT CHANGE UP (ref 10.3–14.5)
RBC BLD AUTO: SIGNIFICANT CHANGE UP
SODIUM SERPL-SCNC: 138 MMOL/L — SIGNIFICANT CHANGE UP (ref 135–145)
URATE SERPL-MCNC: 2.6 MG/DL — LOW (ref 3.4–8.8)
WBC # BLD: 0.54 K/UL — CRITICAL LOW (ref 3.8–10.5)
WBC # FLD AUTO: 0.54 K/UL — CRITICAL LOW (ref 3.8–10.5)

## 2022-01-17 PROCEDURE — 99232 SBSQ HOSP IP/OBS MODERATE 35: CPT

## 2022-01-17 RX ADMIN — Medication 400 MILLIGRAM(S): at 17:11

## 2022-01-17 RX ADMIN — AMPICILLIN SODIUM AND SULBACTAM SODIUM 200 GRAM(S): 250; 125 INJECTION, POWDER, FOR SUSPENSION INTRAMUSCULAR; INTRAVENOUS at 06:59

## 2022-01-17 RX ADMIN — CHLORHEXIDINE GLUCONATE 1 APPLICATION(S): 213 SOLUTION TOPICAL at 08:53

## 2022-01-17 RX ADMIN — CASPOFUNGIN ACETATE 260 MILLIGRAM(S): 7 INJECTION, POWDER, LYOPHILIZED, FOR SOLUTION INTRAVENOUS at 12:17

## 2022-01-17 RX ADMIN — Medication 667 MILLIGRAM(S): at 08:51

## 2022-01-17 RX ADMIN — LORATADINE 10 MILLIGRAM(S): 10 TABLET ORAL at 12:19

## 2022-01-17 RX ADMIN — Medication 50 MILLIGRAM(S): at 08:51

## 2022-01-17 RX ADMIN — FAMOTIDINE 20 MILLIGRAM(S): 10 INJECTION INTRAVENOUS at 12:19

## 2022-01-17 RX ADMIN — Medication 667 MILLIGRAM(S): at 22:20

## 2022-01-17 RX ADMIN — ELTROMBOPAG OLAMINE 150 MILLIGRAM(S): 50 TABLET, FILM COATED ORAL at 12:18

## 2022-01-17 RX ADMIN — Medication 400 MILLIGRAM(S): at 07:00

## 2022-01-17 RX ADMIN — CYCLOSPORINE 450 MILLIGRAM(S): 100 CAPSULE ORAL at 17:10

## 2022-01-17 RX ADMIN — ATOVAQUONE 1500 MILLIGRAM(S): 750 SUSPENSION ORAL at 12:18

## 2022-01-17 RX ADMIN — Medication 1 SPRAY(S): at 06:59

## 2022-01-17 RX ADMIN — Medication 15 MILLILITER(S): at 07:00

## 2022-01-17 RX ADMIN — Medication 1 SPRAY(S): at 17:12

## 2022-01-17 RX ADMIN — Medication 15 MILLILITER(S): at 17:12

## 2022-01-17 RX ADMIN — Medication 650 MILLIGRAM(S): at 08:51

## 2022-01-17 RX ADMIN — SODIUM CHLORIDE 50 MILLILITER(S): 9 INJECTION INTRAMUSCULAR; INTRAVENOUS; SUBCUTANEOUS at 22:19

## 2022-01-17 RX ADMIN — AMPICILLIN SODIUM AND SULBACTAM SODIUM 200 GRAM(S): 250; 125 INJECTION, POWDER, FOR SUSPENSION INTRAMUSCULAR; INTRAVENOUS at 23:16

## 2022-01-17 RX ADMIN — Medication 667 MILLIGRAM(S): at 17:11

## 2022-01-17 RX ADMIN — Medication 15 MILLILITER(S): at 12:18

## 2022-01-17 RX ADMIN — Medication 15 MILLILITER(S): at 23:15

## 2022-01-17 RX ADMIN — AMPICILLIN SODIUM AND SULBACTAM SODIUM 200 GRAM(S): 250; 125 INJECTION, POWDER, FOR SUSPENSION INTRAMUSCULAR; INTRAVENOUS at 17:10

## 2022-01-17 RX ADMIN — Medication 400 MILLIGRAM(S): at 22:20

## 2022-01-17 RX ADMIN — AMPICILLIN SODIUM AND SULBACTAM SODIUM 200 GRAM(S): 250; 125 INJECTION, POWDER, FOR SUSPENSION INTRAMUSCULAR; INTRAVENOUS at 12:17

## 2022-01-17 RX ADMIN — Medication 60 MILLIGRAM(S): at 07:01

## 2022-01-17 RX ADMIN — Medication 25 MILLIGRAM(S): at 08:51

## 2022-01-17 RX ADMIN — CYCLOSPORINE 450 MILLIGRAM(S): 100 CAPSULE ORAL at 07:00

## 2022-01-17 RX ADMIN — Medication 667 MILLIGRAM(S): at 12:18

## 2022-01-17 RX ADMIN — Medication 1 SPRAY(S): at 07:01

## 2022-01-17 NOTE — PROGRESS NOTE ADULT - ATTENDING COMMENTS
36M with Aplastic Anemia diagnosed April 2020.  Underwent treatment with equine ATG, Cyclosporine, and Promacta.  Went into remission (BM bx 5/2021 with normal morphology and cellularity).  Promacta stopped May 2021.  Cyclosporin stopped August 2021.  By September, platelets started to drop.  Now with pancytopenia and neutropenic fever.  No obvious focus of infection. On cefepime, diflucan, acyclovir and mepron.  On cyclosporine 300 mg BID (started on 12/26/21), first level on 12/29/21 --197 increased dose to 325mg bid, repeat level  133, increasing CsA to 350 q12hr.   Today is cyclosporine day 21, day 15 rATG      - Completed rATG and continues with prednisone and Promacta.  - Feels well today, requiring transfusions less frequently- last plt 1/13  - CT sinuses with pansinutitis, appreciate ENT recs, on unasyn, continues to improve, pain is significantly less  - csa changed to 450q12, adjust as needed, follow Mg, LFTs  - Transaminitis improved, switched to caspofungin 1/13/22  -if infection improved, consider dc planning on Tuesday/wednesday 36M with Aplastic Anemia diagnosed April 2020.  Underwent treatment with equine ATG, Cyclosporine, and Promacta.  Went into remission (BM bx 5/2021 with normal morphology and cellularity).  Promacta stopped May 2021.  Cyclosporin stopped August 2021.  By September, platelets started to drop.  Now with pancytopenia and neutropenic fever.  No obvious focus of infection. On cefepime, diflucan, acyclovir and mepron.  On cyclosporine 300 mg BID (started on 12/26/21), first level on 12/29/21 --197 increased dose to 325mg bid, repeat level  133, increasing CsA to 350 q12hr.   Today is cyclosporine day 21, day 16 rATG      - Completed rATG and continues with prednisone and Promacta.  - Feels well today, requiring transfusions less frequently-  - CT sinuses with pansinutitis, appreciate ENT recs, on unasyn through wednesday   - csa changed to 450q12, level went down today, will look at tomorrow and adjust accordingly. if still low will increase to 475q12  - Transaminitis improved, switched to caspofungin 1/13/22  -if infection improved, consider dc planning Thursday If transfusion requirements are ok for dc

## 2022-01-17 NOTE — PROGRESS NOTE ADULT - PROBLEM SELECTOR PLAN 1
Relapsed Aplastic Anemia   s/p hATG, CSA, and promacta with complete remission in 2020, BM bx on 11/9/20 revealed hypocellular marrow.  BMbx 12/2021 showing erythroid predominance, myeloid and erythroid maturation, and markedly decreased megakaryocytes c/w relapse  12/23-   transferred  to [facility] for rATG treatment  Monitor CBC with diff/lytes, transfuse and or replete PRN, Monitor weight, I and O, mouth care.  12/26- Started Cyclosporine 300 mg PO Q 12 hrs , 12/27 test dose - no reaction noted  12/28 PICC placed  12/29 Started  Rabbit ATG/prednisone-Reaction to rATG- HELD on 12/30 due to reaction ; Continue promacta  1/3 Resumed  rabbit ATG/prednisone  1/6 completed rATG. PLTs x 1 abg. counts at clint.  1/7  CSA level 133, increased Cyclosporine dose to 350mg BID. re-check levels qTues/Fri  1/8- F/u Cyclosporine level on 1/9 before am dose- 87.  1/10- CSP level- 77, increased CSP to 400 mg BID with f/u level on Wed 1/12 and 1/14.  1/10- Prednisone taper start on 1/12.  1/12 monitor transaminitis and hyperbilirubinemia  1/14-Cyclosporine level- 192, increase Cyclosporine to 475 mg Q 12hrs.  1/15- Cyclosporine level - 156, no change in dose.  1/16- Cyclosporine level - 404, will lower cyclosporine dose to 450 mg PO Q q12 hrs with f/u level in am. Relapsed Aplastic Anemia   s/p hATG, CSA, and promacta with complete remission in 2020, BM bx on 11/9/20 revealed hypocellular marrow.  BMbx 12/2021 showing erythroid predominance, myeloid and erythroid maturation, and markedly decreased megakaryocytes c/w relapse  12/23-   transferred  to [facility] for rATG treatment  Monitor CBC with diff/lytes, transfuse and or replete PRN, Monitor weight, I and O, mouth care.  12/26- Started Cyclosporine 300 mg PO Q 12 hrs , 12/27 test dose - no reaction noted  12/29 Started  Rabbit ATG/prednisone-Reaction to rATG- HELD on 12/30 due to reaction ; Continue promacta  1/3 Resumed  rabbit ATG/prednisone  1/6 completed rATG. PLTs x 1 abg. counts at clint.  1/7  CSA level 133, increased Cyclosporine dose to 350mg BID. re-check levels qTues/Fri 1/8- F/u Cyclosporine level on 1/9 before am dose- 87.  1/10- CSP level- 77, increased CSP to 400 mg BID with f/u level on Wed 1/12 and 1/14.  1/10- Prednisone taper start on 1/12.  1/12 monitor transaminitis and hyperbilirubinemia  1/14-Cyclosporine level- 192, increase Cyclosporine to 475 mg Q 12hrs.  1/15- Cyclosporine level - 156, no change in dose.  1/16- Cyclosporine level - 404, will lower cyclosporine dose to 450 mg PO Q q12 hrs with f/u level in am.  1/17- CSP level level- 163- will continue Cyclosporine 450 mg Q12, f/u level in am.  1/17- Possible D/C home on 1/120 if requiring less transfusions.

## 2022-01-17 NOTE — PROGRESS NOTE ADULT - ASSESSMENT
Mr. Olivera is a 37 yo M with Aplastic Anemia diagnosed  April 2020, s/p equine ATG, Cyclosporine, and Promacta. Bone marrow biopsy was done on 5/2021 revealed normal morphology and cellularity, eventually Promacta and Cyclosporine was stopped in 5/2021. Then noted his platelets dropping in September 2021.   Now admitted with neutropenic fever found to have pancytopenia secondary to disease condition.  Bone marrow biopsy was repeated on 12/2021 showed decreased megakaryocytes c/w relapsed disease. Rabbit ATG/ prednisone  started on 12/29/21, ATG held starting on 12/30 due to reaction and resumed on 1/3/22. Hospital course complicated by ATG reaction, and neutropenic fever. Pt has pancytopenia secondary to chemotherapy and or disease condition.

## 2022-01-17 NOTE — PROGRESS NOTE ADULT - SUBJECTIVE AND OBJECTIVE BOX
Diagnosis:    Protocol/Chemo Regimen:    Day:     Pt endorsed:    Review of Systems:     Pain scale:     Diet:     Allergies    No Known Allergies    Intolerances        ANTIMICROBIALS  acyclovir   Oral Tab/Cap 400 milliGRAM(s) Oral every 8 hours  ampicillin/sulbactam  IVPB      ampicillin/sulbactam  IVPB 3 Gram(s) IV Intermittent every 6 hours  atovaquone  Suspension 1500 milliGRAM(s) Oral daily  caspofungin IVPB      caspofungin IVPB 50 milliGRAM(s) IV Intermittent every 24 hours      HEME/ONC MEDICATIONS  eltrombopag 150 milliGRAM(s) Oral daily      STANDING MEDICATIONS  Biotene Dry Mouth Oral Rinse 15 milliLiter(s) Swish and Spit four times a day  calcium acetate 667 milliGRAM(s) Oral four times a day with meals  chlorhexidine 2% Cloths 1 Application(s) Topical <User Schedule>  cycloSPORINE  (SandIMMUNE) 450 milliGRAM(s) Oral every 12 hours  famotidine    Tablet 20 milliGRAM(s) Oral daily  fluticasone propionate 50 MICROgram(s)/spray Nasal Spray 1 Spray(s) Both Nostrils two times a day  loratadine 10 milliGRAM(s) Oral daily  methylPREDNISolone sodium succinate IVPB 500 milliGRAM(s) IV Intermittent once  sodium chloride 0.65% Nasal 1 Spray(s) Both Nostrils four times a day  sodium chloride 0.9%. 1000 milliLiter(s) IV Continuous <Continuous>      PRN MEDICATIONS  acetaminophen     Tablet .. 650 milliGRAM(s) Oral every 6 hours PRN  acetaminophen   IVPB .. 1000 milliGRAM(s) IV Intermittent once PRN  aluminum hydroxide/magnesium hydroxide/simethicone Suspension 30 milliLiter(s) Oral every 4 hours PRN  calcium carbonate    500 mG (Tums) Chewable 1 Tablet(s) Chew every 4 hours PRN  diphenhydrAMINE 25 milliGRAM(s) Oral every 12 hours PRN  diphenhydrAMINE Injectable 25 milliGRAM(s) IV Push once PRN  EPINEPHrine     1 mG/mL Injectable 0.3 milliGRAM(s) IntraMuscular once PRN  hydrocortisone sodium succinate Injectable 50 milliGRAM(s) IV Push every 12 hours PRN  metoclopramide Injectable 10 milliGRAM(s) IV Push every 6 hours PRN  ondansetron Injectable 8 milliGRAM(s) IV Push every 8 hours PRN  oxyCODONE    IR 5 milliGRAM(s) Oral every 6 hours PRN        Vital Signs Last 24 Hrs  T(C): 36.8 (17 Jan 2022 00:53), Max: 37.2 (16 Jan 2022 20:53)  T(F): 98.3 (17 Jan 2022 00:53), Max: 98.9 (16 Jan 2022 20:53)  HR: 97 (17 Jan 2022 00:53) (84 - 99)  BP: 129/82 (17 Jan 2022 00:53) (114/69 - 147/77)  BP(mean): --  RR: 18 (17 Jan 2022 00:53) (18 - 18)  SpO2: 96% (17 Jan 2022 00:53) (96% - 99%)    PHYSICAL EXAM  General: NAD  HEENT: PERRLA, EOMOI, clear oropharynx, anicteric sclera, pink conjunctiva  Neck: supple  CV: (+) S1/S2 RRR  Lungs: clear to auscultation, no wheezes or rales  Abdomen: soft, non-tender, non-distended (+) BS  Ext: no clubbing, cyanosis or edema  Skin: no rashes and no petechiae  Neuro: alert and oriented X 3, no focal deficits  Central Line:     RECENT CULTURES:        LABS:                        7.2    0.51  )-----------( 13       ( 16 Jan 2022 07:40 )             20.1         Mean Cell Volume : 83.8 fl  Mean Cell Hemoglobin : 30.0 pg  Mean Cell Hemoglobin Concentration : 35.8 gm/dL  Auto Neutrophil # : 0.40 K/uL  Auto Lymphocyte # : 0.06 K/uL  Auto Monocyte # : 0.06 K/uL  Auto Eosinophil # : 0.00 K/uL  Auto Basophil # : 0.00 K/uL  Auto Neutrophil % : 66.7 %  Auto Lymphocyte % : 11.1 %  Auto Monocyte % : 11.1 %  Auto Eosinophil % : 0.0 %  Auto Basophil % : 0.0 %      01-16    137  |  102  |  20  ----------------------------<  94  3.8   |  24  |  0.79    Ca    8.8      16 Jan 2022 07:31  Phos  4.7     01-16  Mg     1.7     01-16    TPro  6.1  /  Alb  3.6  /  TBili  1.3<H>  /  DBili  0.3  /  AST  12  /  ALT  114<H>  /  AlkPhos  72  01-16      Mg 1.7  Phos 4.7      PT/INR - ( 16 Jan 2022 07:41 )   PT: 11.3 sec;   INR: 0.94 ratio         PTT - ( 16 Jan 2022 07:41 )  PTT:21.6 sec      Uric Acid 2.5        RADIOLOGY & ADDITIONAL STUDIES:         Diagnosis: Relapsed aplastic anemia    Protocol/Chemo Regimen:  Rabbit ATG/Cyclosporine/prednisone/Promacta     Day:  Rabbit ATG day  16 (D2 suspended due to reaction; resumed on 1/3)     Pt endorsed: + sinus pain improved continue to feel sinus pressure    Review of Systems: Denies any chest pain, palpitation, SOB, abdominal pain or dysuria.    Pain scale: Denies                                         Diet: regular Enlive QD    Allergies: No Known Allergies    ANTIMICROBIALS  acyclovir   Oral Tab/Cap 400 milliGRAM(s) Oral every 8 hours  ampicillin/sulbactam  IVPB      ampicillin/sulbactam  IVPB 3 Gram(s) IV Intermittent every 6 hours  atovaquone  Suspension 1500 milliGRAM(s) Oral daily  caspofungin IVPB      caspofungin IVPB 50 milliGRAM(s) IV Intermittent every 24 hours    HEME/ONC MEDICATIONS  eltrombopag 150 milliGRAM(s) Oral daily    STANDING MEDICATIONS  Biotene Dry Mouth Oral Rinse 15 milliLiter(s) Swish and Spit four times a day  calcium acetate 667 milliGRAM(s) Oral four times a day with meals  chlorhexidine 2% Cloths 1 Application(s) Topical <User Schedule>  cycloSPORINE  (SandIMMUNE) 450 milliGRAM(s) Oral every 12 hours  famotidine    Tablet 20 milliGRAM(s) Oral daily  fluticasone propionate 50 MICROgram(s)/spray Nasal Spray 1 Spray(s) Both Nostrils two times a day  loratadine 10 milliGRAM(s) Oral daily  methylPREDNISolone sodium succinate IVPB 500 milliGRAM(s) IV Intermittent once  sodium chloride 0.65% Nasal 1 Spray(s) Both Nostrils four times a day  sodium chloride 0.9%. 1000 milliLiter(s) IV Continuous <Continuous>    PRN MEDICATIONS  acetaminophen     Tablet .. 650 milliGRAM(s) Oral every 6 hours PRN  acetaminophen   IVPB .. 1000 milliGRAM(s) IV Intermittent once PRN  aluminum hydroxide/magnesium hydroxide/simethicone Suspension 30 milliLiter(s) Oral every 4 hours PRN  calcium carbonate    500 mG (Tums) Chewable 1 Tablet(s) Chew every 4 hours PRN  diphenhydrAMINE 25 milliGRAM(s) Oral every 12 hours PRN  diphenhydrAMINE Injectable 25 milliGRAM(s) IV Push once PRN  EPINEPHrine     1 mG/mL Injectable 0.3 milliGRAM(s) IntraMuscular once PRN  hydrocortisone sodium succinate Injectable 50 milliGRAM(s) IV Push every 12 hours PRN  metoclopramide Injectable 10 milliGRAM(s) IV Push every 6 hours PRN  ondansetron Injectable 8 milliGRAM(s) IV Push every 8 hours PRN  oxyCODONE    IR 5 milliGRAM(s) Oral every 6 hours PRN    Vital Signs Last 24 Hrs  T(C): 36.8 (17 Jan 2022 00:53), Max: 37.2 (16 Jan 2022 20:53)  T(F): 98.3 (17 Jan 2022 00:53), Max: 98.9 (16 Jan 2022 20:53)  HR: 97 (17 Jan 2022 00:53) (84 - 99)  BP: 129/82 (17 Jan 2022 00:53) (114/69 - 147/77)  BP(mean): --  RR: 18 (17 Jan 2022 00:53) (18 - 18)  SpO2: 96% (17 Jan 2022 00:53) (96% - 99%)    PHYSICAL EXAM  General: adult in NAD  HEENT: clear oropharynx, sinus tenderness resolved.  CV: normal S1/S2 RRR  Lungs: clear to auscultation, no wheezes, no rales  Abdomen: soft non-tender non-distended  Ext: no edema in BLE  Skin: no rash  Neuro: alert and oriented X 4  Central Line: RUE PICC  c/d/i    RECENT CULTURES:  Culture - Blood (12.29.21 @ 21:56)    Specimen Source: .Blood Blood-Peripheral    Culture Results:   No Growth Final    Culture - Urine (12.30.21 @ 00:57)    Specimen Source: Clean Catch Clean Catch (Midstream)    Culture Results:   No growth            LABS:                        7.2    0.54  )-----------( 9        ( 17 Jan 2022 07:31 )             19.7     Mean Cell Volume : 83.1 fl  Mean Cell Hemoglobin : 30.4 pg  Mean Cell Hemoglobin Concentration : 36.5 gm/dL  Auto Neutrophil # : 0.35 K/uL  Auto Lymphocyte # : 0.14 K/uL  Auto Monocyte # : 0.05 K/uL  Auto Eosinophil # : 0.01 K/uL  Auto Basophil # : 0.00 K/uL  Auto Neutrophil % : 63.8 %  Auto Lymphocyte % : 25.5 %  Auto Monocyte % : 8.5 %  Auto Eosinophil % : 1.1 %  Auto Basophil % : 0.0 %      01-17    138  |  102  |  21  ----------------------------<  94  3.9   |  23  |  0.80    Ca    8.7      17 Jan 2022 07:31  Phos  4.8     01-17  Mg     1.8     01-17    TPro  6.2  /  Alb  3.6  /  TBili  1.2  /  DBili  x   /  AST  10  /  ALT  85<H>  /  AlkPhos  71  01-17  Mg 1.8  Phos 4.8  PT/INR - ( 17 Jan 2022 07:31 )   PT: 12.0 sec;   INR: 1.00 ratio    PTT - ( 17 Jan 2022 07:31 )  PTT:23.9 sec    Uric Acid 2.6    RADIOLOGY & ADDITIONAL STUDIES:   CT Sinuses w/ IV Cont (01.09.22 @ 11:32) >  Pansinusitis.

## 2022-01-17 NOTE — PROGRESS NOTE ADULT - PROBLEM SELECTOR PLAN 2
Neutropenic, afebrile   continue diflucan, Acyclovir, Mepron.  CT CAP  to look for source of infection- No CT evidence for source of infection in the chest, abdomen or pelvis.  12/24- Staph aureus PCR detected, will start Vanco 1 gm Q 12 hrs stopped 12/26. CT maxillo facial did not reveal any abscess.   1/5 if fever recurs will discontinue Diflucan and start voriconazole as per ID.  1/9- CT sinus with contrast shows pansinusitis, ENT consulted started on Unasyn 3gm Q 6 hrs, d/adriana Cefepime and Zithromax.  ID following  Pan cx CXR if fever  1/13 switch Diflucan to Caspofungin d/t transaminitis Neutropenic, afebrile   continue diflucan, Acyclovir, Mepron.  CT CAP  to look for source of infection- No CT evidence for source of infection in the chest, abdomen or pelvis.  12/24- Staph aureus PCR detected, will start Vanco 1 gm Q 12 hrs stopped 12/26. CT maxillo facial did not reveal any abscess.   1/5 if fever recurs will discontinue Diflucan and start voriconazole as per ID.  1/9- CT sinus with contrast shows pansinusitis, ENT consulted started on Unasyn 3gm Q 6 hrs, d/adriana Cefepime and Zithromax, continue Unasyn for 10 days thru 1/19.  ID following  Pan cx CXR if fever  1/13 switch Diflucan to Caspofungin d/t transaminitis

## 2022-01-17 NOTE — PROGRESS NOTE ADULT - PROBLEM SELECTOR PLAN 6
ALT- 149, trending down, Diflucan changed to Caspo.  Will monitor closely. LFT trending down, Diflucan changed to Caspo.  Will monitor closely.

## 2022-01-18 LAB
ALBUMIN SERPL ELPH-MCNC: 3.6 G/DL — SIGNIFICANT CHANGE UP (ref 3.3–5)
ALP SERPL-CCNC: 65 U/L — SIGNIFICANT CHANGE UP (ref 40–120)
ALT FLD-CCNC: 64 U/L — HIGH (ref 10–45)
ANION GAP SERPL CALC-SCNC: 11 MMOL/L — SIGNIFICANT CHANGE UP (ref 5–17)
APTT BLD: 20.7 SEC — LOW (ref 27.5–35.5)
AST SERPL-CCNC: 10 U/L — SIGNIFICANT CHANGE UP (ref 10–40)
BILIRUB SERPL-MCNC: 1.2 MG/DL — SIGNIFICANT CHANGE UP (ref 0.2–1.2)
BUN SERPL-MCNC: 23 MG/DL — SIGNIFICANT CHANGE UP (ref 7–23)
CALCIUM SERPL-MCNC: 8.8 MG/DL — SIGNIFICANT CHANGE UP (ref 8.4–10.5)
CHLORIDE SERPL-SCNC: 103 MMOL/L — SIGNIFICANT CHANGE UP (ref 96–108)
CO2 SERPL-SCNC: 26 MMOL/L — SIGNIFICANT CHANGE UP (ref 22–31)
CREAT SERPL-MCNC: 0.91 MG/DL — SIGNIFICANT CHANGE UP (ref 0.5–1.3)
CYCLOSPORINE SER-MCNC: 161 NG/ML — SIGNIFICANT CHANGE UP (ref 150–400)
FIBRINOGEN PPP-MCNC: 428 MG/DL — SIGNIFICANT CHANGE UP (ref 290–520)
GLUCOSE SERPL-MCNC: 95 MG/DL — SIGNIFICANT CHANGE UP (ref 70–99)
HCT VFR BLD CALC: 17.8 % — CRITICAL LOW (ref 39–50)
HGB BLD-MCNC: 6.4 G/DL — CRITICAL LOW (ref 13–17)
INR BLD: 1 RATIO — SIGNIFICANT CHANGE UP (ref 0.88–1.16)
LDH SERPL L TO P-CCNC: 184 U/L — SIGNIFICANT CHANGE UP (ref 50–242)
MAGNESIUM SERPL-MCNC: 1.6 MG/DL — SIGNIFICANT CHANGE UP (ref 1.6–2.6)
MCHC RBC-ENTMCNC: 30.2 PG — SIGNIFICANT CHANGE UP (ref 27–34)
MCHC RBC-ENTMCNC: 36 GM/DL — SIGNIFICANT CHANGE UP (ref 32–36)
MCV RBC AUTO: 84 FL — SIGNIFICANT CHANGE UP (ref 80–100)
NRBC # BLD: 2 /100 WBCS — HIGH (ref 0–0)
PHOSPHATE SERPL-MCNC: 5.2 MG/DL — HIGH (ref 2.5–4.5)
PLATELET # BLD AUTO: 29 K/UL — LOW (ref 150–400)
POTASSIUM SERPL-MCNC: 3.9 MMOL/L — SIGNIFICANT CHANGE UP (ref 3.5–5.3)
POTASSIUM SERPL-SCNC: 3.9 MMOL/L — SIGNIFICANT CHANGE UP (ref 3.5–5.3)
PROT SERPL-MCNC: 5.9 G/DL — LOW (ref 6–8.3)
PROTHROM AB SERPL-ACNC: 12 SEC — SIGNIFICANT CHANGE UP (ref 10.6–13.6)
RBC # BLD: 2.12 M/UL — LOW (ref 4.2–5.8)
RBC # FLD: 12.4 % — SIGNIFICANT CHANGE UP (ref 10.3–14.5)
SODIUM SERPL-SCNC: 140 MMOL/L — SIGNIFICANT CHANGE UP (ref 135–145)
URATE SERPL-MCNC: 2.9 MG/DL — LOW (ref 3.4–8.8)
WBC # BLD: 0.48 K/UL — CRITICAL LOW (ref 3.8–10.5)
WBC # FLD AUTO: 0.48 K/UL — CRITICAL LOW (ref 3.8–10.5)

## 2022-01-18 PROCEDURE — 99233 SBSQ HOSP IP/OBS HIGH 50: CPT

## 2022-01-18 RX ORDER — CYCLOSPORINE 100 MG/1
3 CAPSULE ORAL
Qty: 180 | Refills: 3
Start: 2022-01-18 | End: 2022-05-17

## 2022-01-18 RX ORDER — CYCLOSPORINE 100 MG/1
19 CAPSULE ORAL
Qty: 1140 | Refills: 1
Start: 2022-01-18 | End: 2022-03-18

## 2022-01-18 RX ORDER — FLUTICASONE PROPIONATE 50 MCG
1 SPRAY, SUSPENSION NASAL
Qty: 0 | Refills: 0 | DISCHARGE
Start: 2022-01-18

## 2022-01-18 RX ORDER — CYCLOSPORINE 100 MG/1
475 CAPSULE ORAL EVERY 12 HOURS
Refills: 0 | Status: DISCONTINUED | OUTPATIENT
Start: 2022-01-18 | End: 2022-01-20

## 2022-01-18 RX ORDER — POSACONAZOLE 100 MG/1
3 TABLET, DELAYED RELEASE ORAL
Qty: 90 | Refills: 2
Start: 2022-01-18 | End: 2022-04-17

## 2022-01-18 RX ORDER — POSACONAZOLE 100 MG/1
300 TABLET, DELAYED RELEASE ORAL DAILY
Refills: 0 | Status: DISCONTINUED | OUTPATIENT
Start: 2022-01-18 | End: 2022-01-20

## 2022-01-18 RX ORDER — CALCIUM ACETATE 667 MG
667 TABLET ORAL
Refills: 0 | Status: COMPLETED | OUTPATIENT
Start: 2022-01-18 | End: 2022-01-19

## 2022-01-18 RX ORDER — ATOVAQUONE 750 MG/5ML
10 SUSPENSION ORAL
Qty: 300 | Refills: 3
Start: 2022-01-18 | End: 2022-05-17

## 2022-01-18 RX ADMIN — Medication 1 SPRAY(S): at 06:31

## 2022-01-18 RX ADMIN — ATOVAQUONE 1500 MILLIGRAM(S): 750 SUSPENSION ORAL at 11:48

## 2022-01-18 RX ADMIN — AMPICILLIN SODIUM AND SULBACTAM SODIUM 200 GRAM(S): 250; 125 INJECTION, POWDER, FOR SUSPENSION INTRAMUSCULAR; INTRAVENOUS at 11:37

## 2022-01-18 RX ADMIN — LORATADINE 10 MILLIGRAM(S): 10 TABLET ORAL at 11:47

## 2022-01-18 RX ADMIN — Medication 667 MILLIGRAM(S): at 07:53

## 2022-01-18 RX ADMIN — Medication 15 MILLILITER(S): at 06:29

## 2022-01-18 RX ADMIN — AMPICILLIN SODIUM AND SULBACTAM SODIUM 200 GRAM(S): 250; 125 INJECTION, POWDER, FOR SUSPENSION INTRAMUSCULAR; INTRAVENOUS at 23:23

## 2022-01-18 RX ADMIN — Medication 1 SPRAY(S): at 17:36

## 2022-01-18 RX ADMIN — Medication 400 MILLIGRAM(S): at 06:30

## 2022-01-18 RX ADMIN — CYCLOSPORINE 475 MILLIGRAM(S): 100 CAPSULE ORAL at 17:39

## 2022-01-18 RX ADMIN — CHLORHEXIDINE GLUCONATE 1 APPLICATION(S): 213 SOLUTION TOPICAL at 09:06

## 2022-01-18 RX ADMIN — POSACONAZOLE 300 MILLIGRAM(S): 100 TABLET, DELAYED RELEASE ORAL at 11:45

## 2022-01-18 RX ADMIN — FAMOTIDINE 20 MILLIGRAM(S): 10 INJECTION INTRAVENOUS at 11:47

## 2022-01-18 RX ADMIN — Medication 1 SPRAY(S): at 23:23

## 2022-01-18 RX ADMIN — Medication 40 MILLIGRAM(S): at 06:29

## 2022-01-18 RX ADMIN — Medication 400 MILLIGRAM(S): at 13:50

## 2022-01-18 RX ADMIN — Medication 15 MILLILITER(S): at 11:36

## 2022-01-18 RX ADMIN — CYCLOSPORINE 450 MILLIGRAM(S): 100 CAPSULE ORAL at 06:31

## 2022-01-18 RX ADMIN — Medication 25 MILLIGRAM(S): at 12:36

## 2022-01-18 RX ADMIN — SODIUM CHLORIDE 50 MILLILITER(S): 9 INJECTION INTRAMUSCULAR; INTRAVENOUS; SUBCUTANEOUS at 22:47

## 2022-01-18 RX ADMIN — AMPICILLIN SODIUM AND SULBACTAM SODIUM 200 GRAM(S): 250; 125 INJECTION, POWDER, FOR SUSPENSION INTRAMUSCULAR; INTRAVENOUS at 17:35

## 2022-01-18 RX ADMIN — Medication 667 MILLIGRAM(S): at 11:47

## 2022-01-18 RX ADMIN — Medication 1 SPRAY(S): at 11:36

## 2022-01-18 RX ADMIN — Medication 400 MILLIGRAM(S): at 22:48

## 2022-01-18 RX ADMIN — ELTROMBOPAG OLAMINE 150 MILLIGRAM(S): 50 TABLET, FILM COATED ORAL at 12:35

## 2022-01-18 RX ADMIN — Medication 15 MILLILITER(S): at 17:35

## 2022-01-18 RX ADMIN — Medication 50 MILLIGRAM(S): at 12:37

## 2022-01-18 RX ADMIN — Medication 15 MILLILITER(S): at 23:23

## 2022-01-18 RX ADMIN — AMPICILLIN SODIUM AND SULBACTAM SODIUM 200 GRAM(S): 250; 125 INJECTION, POWDER, FOR SUSPENSION INTRAMUSCULAR; INTRAVENOUS at 06:31

## 2022-01-18 RX ADMIN — Medication 667 MILLIGRAM(S): at 17:35

## 2022-01-18 NOTE — PROGRESS NOTE ADULT - PROBLEM SELECTOR PLAN 4
no Lovenox DVT PPX due to thrombocytopenia  Encourage OOB and ambulation 1/9- CT Sinus with contrast shows pansinusitis-   Continue ocean spray TID  Started Unasyn for pansinusitis thru 1/19  ENT following  Sinus pressure improving

## 2022-01-18 NOTE — PROGRESS NOTE ADULT - PROBLEM SELECTOR PLAN 1
Relapsed Aplastic Anemia   s/p hATG, CSA, and promacta with complete remission in 2020, BM bx on 11/9/20 revealed hypocellular marrow.  BMbx 12/2021 showing erythroid predominance, myeloid and erythroid maturation, and markedly decreased megakaryocytes c/w relapse  12/23-   transferred  to [facility] for rATG treatment  Monitor CBC with diff/lytes, transfuse and or replete PRN, Monitor weight, I and O, mouth care.  12/26- Started Cyclosporine 300 mg PO Q 12 hrs , 12/27 test dose - no reaction noted  12/29 Started  Rabbit ATG/prednisone-Reaction to rATG- HELD on 12/30 due to reaction ; Continue promacta  1/3 Resumed  rabbit ATG/prednisone  1/6 completed rATG. PLTs x 1 abg. counts at clint.  1/7  CSA level 133, increased Cyclosporine dose to 350mg BID. re-check levels qTues/Fri 1/8- F/u Cyclosporine level on 1/9 before am dose- 87.  1/10- CSP level- 77, increased CSP to 400 mg BID with f/u level on Wed 1/12 and 1/14.  1/10- Prednisone taper start on 1/12.  1/12 monitor transaminitis and hyperbilirubinemia  1/14-Cyclosporine level- 192, increase Cyclosporine to 475 mg Q 12hrs.  1/15- Cyclosporine level - 156, no change in dose.  1/16- Cyclosporine level - 404, will lower cyclosporine dose to 450 mg PO Q q12 hrs with f/u level in am.  1/17- CSP level level- 163- will continue Cyclosporine 450 mg Q12, f/u level in am.  1/17- Possible D/C home on 1/120 if requiring less transfusions. Relapsed Aplastic Anemia   s/p hATG, CSA, and promacta with complete remission in 2020, BM bx on 11/9/20 revealed hypocellular marrow.  BMbx 12/2021 showing erythroid predominance, myeloid and erythroid maturation, and markedly decreased megakaryocytes c/w relapse  12/23-   transferred  to [facility] for rATG treatment  Monitor CBC with diff/lytes, transfuse and or replete PRN, Monitor weight, I and O, mouth care.  12/26- Started Cyclosporine, 12/27 test dose - no reaction noted  12/29 Started  Rabbit ATG/prednisone-Reaction to rATG- HELD on 12/30 due to reaction ; Continue promacta  1/3 Resumed  rabbit ATG/prednisone  1/6 completed rATG.   1/10- Prednisone taper start on 1/12.  1/12 monitor transaminitis and hyperbilirubinemia  1/17- Possible D/C home on 1/120 if requiring less transfusions. Relapsed Aplastic Anemia   s/p hATG, CSA, and promacta with complete remission in 2020, BM bx on 11/9/20 revealed hypocellular marrow.  BMbx 12/2021 showing erythroid predominance, myeloid and erythroid maturation, and markedly decreased megakaryocytes c/w relapse  12/23-   transferred  to [facility] for rATG treatment  Monitor CBC with diff/lytes, transfuse and or replete PRN, Monitor weight, I and O, mouth care.  12/26- Started Cyclosporine.  12/27 test dose - no reaction noted  12/29 Started  Rabbit ATG/prednisone-Reaction to rATG- HELD on 12/30 due to reaction ; Continue promacta  1/3 Resumed  rabbit ATG/prednisone  1/6 completed rATG.   1/10- Prednisone taper start on 1/12.  Elevated CSA level on 1/16 was likely lab error. Dose increased to 475mg 1/18 with repeat level on Thurs 1/20.   1/17- Possible D/C home on 1/120 if requiring less transfusions. Relapsed Aplastic Anemia   s/p hATG, CSA, and promacta with complete remission in 2020, BM bx on 11/9/20 revealed hypocellular marrow.  BMbx 12/2021 showing erythroid predominance, myeloid and erythroid maturation, and markedly decreased megakaryocytes c/w relapse  12/23-   transferred  to [facility] for rATG treatment  Monitor CBC with diff/lytes, transfuse and or replete PRN, Monitor weight, I and O, mouth care.  12/26- Started Cyclosporine.  12/27 test dose - no reaction noted  12/29 Started  Rabbit ATG/prednisone-Reaction to rATG- HELD on 12/30 due to reaction ; Continue promacta  1/3 Resumed  rabbit ATG/prednisone  1/6 completed rATG.   1/10- Prednisone taper start on 1/12.  Elevated CSA level on 1/16 was likely lab error-falsely elevated 404 . Dose increased to 475mg 1/18 because level was low 2 days in a row (163/161) now due for repeat level on Thurs 1/20.   1/17- Possible D/C home on 1/120 if requiring less transfusions.

## 2022-01-18 NOTE — PROGRESS NOTE ADULT - SUBJECTIVE AND OBJECTIVE BOX
Diagnosis: Relapsed aplastic anemia    Protocol/Chemo Regimen:  Rabbit ATG/Cyclosporine/prednisone/Promacta     Day:  Rabbit ATG day  17 (D2 suspended due to reaction; resumed on 1/3)     Pt endorsed: + sinus pain improved continue to feel sinus pressure    Review of Systems: Denies any chest pain, palpitation, SOB, abdominal pain or dysuria.    Pain scale: Denies                                         Diet: regular Enlive QD    Allergies    No Known Allergies    Intolerances        ANTIMICROBIALS  acyclovir   Oral Tab/Cap 400 milliGRAM(s) Oral every 8 hours  ampicillin/sulbactam  IVPB      ampicillin/sulbactam  IVPB 3 Gram(s) IV Intermittent every 6 hours  atovaquone  Suspension 1500 milliGRAM(s) Oral daily  caspofungin IVPB      caspofungin IVPB 50 milliGRAM(s) IV Intermittent every 24 hours      HEME/ONC MEDICATIONS  eltrombopag 150 milliGRAM(s) Oral daily      STANDING MEDICATIONS  Biotene Dry Mouth Oral Rinse 15 milliLiter(s) Swish and Spit four times a day  calcium acetate 667 milliGRAM(s) Oral four times a day with meals  chlorhexidine 2% Cloths 1 Application(s) Topical <User Schedule>  cycloSPORINE  (SandIMMUNE) 450 milliGRAM(s) Oral every 12 hours  famotidine    Tablet 20 milliGRAM(s) Oral daily  fluticasone propionate 50 MICROgram(s)/spray Nasal Spray 1 Spray(s) Both Nostrils two times a day  loratadine 10 milliGRAM(s) Oral daily  methylPREDNISolone sodium succinate IVPB 500 milliGRAM(s) IV Intermittent once  predniSONE   Tablet 40 milliGRAM(s) Oral daily  sodium chloride 0.65% Nasal 1 Spray(s) Both Nostrils four times a day  sodium chloride 0.9%. 1000 milliLiter(s) IV Continuous <Continuous>      PRN MEDICATIONS  acetaminophen     Tablet .. 650 milliGRAM(s) Oral every 6 hours PRN  acetaminophen   IVPB .. 1000 milliGRAM(s) IV Intermittent once PRN  aluminum hydroxide/magnesium hydroxide/simethicone Suspension 30 milliLiter(s) Oral every 4 hours PRN  calcium carbonate    500 mG (Tums) Chewable 1 Tablet(s) Chew every 4 hours PRN  diphenhydrAMINE 25 milliGRAM(s) Oral every 12 hours PRN  diphenhydrAMINE Injectable 25 milliGRAM(s) IV Push once PRN  EPINEPHrine     1 mG/mL Injectable 0.3 milliGRAM(s) IntraMuscular once PRN  hydrocortisone sodium succinate Injectable 50 milliGRAM(s) IV Push every 12 hours PRN  metoclopramide Injectable 10 milliGRAM(s) IV Push every 6 hours PRN  ondansetron Injectable 8 milliGRAM(s) IV Push every 8 hours PRN  oxyCODONE    IR 5 milliGRAM(s) Oral every 6 hours PRN        Vital Signs Last 24 Hrs  T(C): 36.9 (18 Jan 2022 05:33), Max: 36.9 (17 Jan 2022 21:30)  T(F): 98.5 (18 Jan 2022 05:33), Max: 98.5 (18 Jan 2022 05:33)  HR: 90 (18 Jan 2022 05:33) (80 - 106)  BP: 126/75 (18 Jan 2022 05:33) (120/77 - 142/86)  BP(mean): --  RR: 16 (18 Jan 2022 05:33) (16 - 18)  SpO2: 95% (18 Jan 2022 05:33) (95% - 98%)        LABS:                       Diagnosis: Relapsed aplastic anemia    Protocol/Chemo Regimen:  Rabbit ATG/Cyclosporine/prednisone/Promacta     Day:  Rabbit ATG day  17 (D2 suspended due to reaction; resumed on 1/3)     Pt endorsed: No complaints.     Review of Systems: Denies any chest pain, palpitation, SOB, abdominal pain or dysuria.    Pain scale: Denies                                         Diet: regular Enlive QD    Allergies    No Known Allergies    Intolerances    ANTIMICROBIALS  acyclovir   Oral Tab/Cap 400 milliGRAM(s) Oral every 8 hours  ampicillin/sulbactam  IVPB      ampicillin/sulbactam  IVPB 3 Gram(s) IV Intermittent every 6 hours  atovaquone  Suspension 1500 milliGRAM(s) Oral daily  caspofungin IVPB      caspofungin IVPB 50 milliGRAM(s) IV Intermittent every 24 hours      HEME/ONC MEDICATIONS  eltrombopag 150 milliGRAM(s) Oral daily      STANDING MEDICATIONS  Biotene Dry Mouth Oral Rinse 15 milliLiter(s) Swish and Spit four times a day  calcium acetate 667 milliGRAM(s) Oral four times a day with meals  chlorhexidine 2% Cloths 1 Application(s) Topical <User Schedule>  cycloSPORINE  (SandIMMUNE) 450 milliGRAM(s) Oral every 12 hours  famotidine    Tablet 20 milliGRAM(s) Oral daily  fluticasone propionate 50 MICROgram(s)/spray Nasal Spray 1 Spray(s) Both Nostrils two times a day  loratadine 10 milliGRAM(s) Oral daily  methylPREDNISolone sodium succinate IVPB 500 milliGRAM(s) IV Intermittent once  predniSONE   Tablet 40 milliGRAM(s) Oral daily  sodium chloride 0.65% Nasal 1 Spray(s) Both Nostrils four times a day  sodium chloride 0.9%. 1000 milliLiter(s) IV Continuous <Continuous>      PRN MEDICATIONS  acetaminophen     Tablet .. 650 milliGRAM(s) Oral every 6 hours PRN  acetaminophen   IVPB .. 1000 milliGRAM(s) IV Intermittent once PRN  aluminum hydroxide/magnesium hydroxide/simethicone Suspension 30 milliLiter(s) Oral every 4 hours PRN  calcium carbonate    500 mG (Tums) Chewable 1 Tablet(s) Chew every 4 hours PRN  diphenhydrAMINE 25 milliGRAM(s) Oral every 12 hours PRN  diphenhydrAMINE Injectable 25 milliGRAM(s) IV Push once PRN  EPINEPHrine     1 mG/mL Injectable 0.3 milliGRAM(s) IntraMuscular once PRN  hydrocortisone sodium succinate Injectable 50 milliGRAM(s) IV Push every 12 hours PRN  metoclopramide Injectable 10 milliGRAM(s) IV Push every 6 hours PRN  ondansetron Injectable 8 milliGRAM(s) IV Push every 8 hours PRN  oxyCODONE    IR 5 milliGRAM(s) Oral every 6 hours PRN        Vital Signs Last 24 Hrs  T(C): 36.9 (18 Jan 2022 05:33), Max: 36.9 (17 Jan 2022 21:30)  T(F): 98.5 (18 Jan 2022 05:33), Max: 98.5 (18 Jan 2022 05:33)  HR: 90 (18 Jan 2022 05:33) (80 - 106)  BP: 126/75 (18 Jan 2022 05:33) (120/77 - 142/86)  BP(mean): --  RR: 16 (18 Jan 2022 05:33) (16 - 18)  SpO2: 95% (18 Jan 2022 05:33) (95% - 98%)    LABS:    Blood Cultures:                           6.4    0.48  )-----------( 29       ( 18 Jan 2022 08:21 )             17.8         Mean Cell Volume : 84.0 fl  Mean Cell Hemoglobin : 30.2 pg  Mean Cell Hemoglobin Concentration : 36.0 gm/dL  Auto Neutrophil # : x  Auto Lymphocyte # : x  Auto Monocyte # : x  Auto Eosinophil # : x  Auto Basophil # : x  Auto Neutrophil % : x  Auto Lymphocyte % : x  Auto Monocyte % : x  Auto Eosinophil % : x  Auto Basophil % : x      01-18    140  |  103  |  23  ----------------------------<  95  3.9   |  26  |  0.91    Ca    8.8      18 Jan 2022 08:21  Phos  5.2     01-18  Mg     1.6     01-18    TPro  5.9<L>  /  Alb  3.6  /  TBili  1.2  /  DBili  x   /  AST  10  /  ALT  64<H>  /  AlkPhos  65  01-18    PT/INR - ( 18 Jan 2022 08:21 )   PT: 12.0 sec;   INR: 1.00 ratio    PTT - ( 18 Jan 2022 08:21 )  PTT:20.7 sec      Uric Acid 2.9                                     Diagnosis: Relapsed aplastic anemia    Protocol/Chemo Regimen:  Rabbit ATG/Cyclosporine/prednisone/Promacta     Day:  Rabbit ATG day  17 (D2 suspended due to reaction; resumed on 1/3)     Pt endorsed: No complaints.     Review of Systems: Denies any chest pain, palpitation, SOB, abdominal pain or dysuria.    Pain scale: Denies                                         Diet: regular Enlive QD    Allergies    No Known Allergies    Intolerances    ANTIMICROBIALS  acyclovir   Oral Tab/Cap 400 milliGRAM(s) Oral every 8 hours  ampicillin/sulbactam  IVPB      ampicillin/sulbactam  IVPB 3 Gram(s) IV Intermittent every 6 hours  atovaquone  Suspension 1500 milliGRAM(s) Oral daily  caspofungin IVPB      caspofungin IVPB 50 milliGRAM(s) IV Intermittent every 24 hours      HEME/ONC MEDICATIONS  eltrombopag 150 milliGRAM(s) Oral daily      STANDING MEDICATIONS  Biotene Dry Mouth Oral Rinse 15 milliLiter(s) Swish and Spit four times a day  calcium acetate 667 milliGRAM(s) Oral four times a day with meals  chlorhexidine 2% Cloths 1 Application(s) Topical <User Schedule>  cycloSPORINE  (SandIMMUNE) 450 milliGRAM(s) Oral every 12 hours  famotidine    Tablet 20 milliGRAM(s) Oral daily  fluticasone propionate 50 MICROgram(s)/spray Nasal Spray 1 Spray(s) Both Nostrils two times a day  loratadine 10 milliGRAM(s) Oral daily  methylPREDNISolone sodium succinate IVPB 500 milliGRAM(s) IV Intermittent once  predniSONE   Tablet 40 milliGRAM(s) Oral daily  sodium chloride 0.65% Nasal 1 Spray(s) Both Nostrils four times a day  sodium chloride 0.9%. 1000 milliLiter(s) IV Continuous <Continuous>      PRN MEDICATIONS  acetaminophen     Tablet .. 650 milliGRAM(s) Oral every 6 hours PRN  acetaminophen   IVPB .. 1000 milliGRAM(s) IV Intermittent once PRN  aluminum hydroxide/magnesium hydroxide/simethicone Suspension 30 milliLiter(s) Oral every 4 hours PRN  calcium carbonate    500 mG (Tums) Chewable 1 Tablet(s) Chew every 4 hours PRN  diphenhydrAMINE 25 milliGRAM(s) Oral every 12 hours PRN  diphenhydrAMINE Injectable 25 milliGRAM(s) IV Push once PRN  EPINEPHrine     1 mG/mL Injectable 0.3 milliGRAM(s) IntraMuscular once PRN  hydrocortisone sodium succinate Injectable 50 milliGRAM(s) IV Push every 12 hours PRN  metoclopramide Injectable 10 milliGRAM(s) IV Push every 6 hours PRN  ondansetron Injectable 8 milliGRAM(s) IV Push every 8 hours PRN  oxyCODONE    IR 5 milliGRAM(s) Oral every 6 hours PRN        Vital Signs Last 24 Hrs  T(C): 36.9 (18 Jan 2022 05:33), Max: 36.9 (17 Jan 2022 21:30)  T(F): 98.5 (18 Jan 2022 05:33), Max: 98.5 (18 Jan 2022 05:33)  HR: 90 (18 Jan 2022 05:33) (80 - 106)  BP: 126/75 (18 Jan 2022 05:33) (120/77 - 142/86)  BP(mean): --  RR: 16 (18 Jan 2022 05:33) (16 - 18)  SpO2: 95% (18 Jan 2022 05:33) (95% - 98%)    LABS:                 6.4    0.48  )-----------( 29       ( 18 Jan 2022 08:21 )             17.8         Mean Cell Volume : 84.0 fl  Mean Cell Hemoglobin : 30.2 pg  Mean Cell Hemoglobin Concentration : 36.0 gm/dL  Auto Neutrophil # : x  Auto Lymphocyte # : x  Auto Monocyte # : x  Auto Eosinophil # : x  Auto Basophil # : x  Auto Neutrophil % : x  Auto Lymphocyte % : x  Auto Monocyte % : x  Auto Eosinophil % : x  Auto Basophil % : x      01-18    140  |  103  |  23  ----------------------------<  95  3.9   |  26  |  0.91    Ca    8.8      18 Jan 2022 08:21  Phos  5.2     01-18  Mg     1.6     01-18    TPro  5.9<L>  /  Alb  3.6  /  TBili  1.2  /  DBili  x   /  AST  10  /  ALT  64<H>  /  AlkPhos  65  01-18    PT/INR - ( 18 Jan 2022 08:21 )   PT: 12.0 sec;   INR: 1.00 ratio    PTT - ( 18 Jan 2022 08:21 )  PTT:20.7 sec      Uric Acid 2.9                                     Diagnosis: Relapsed aplastic anemia    Protocol/Chemo Regimen:  Rabbit ATG/Cyclosporine/prednisone/Promacta     Day:  Rabbit ATG day  17 (D2 suspended due to reaction; resumed on 1/3)     Pt endorsed: No complaints.     Review of Systems: Denies any chest pain, palpitation, SOB, abdominal pain or dysuria.    Pain scale: Denies                                         Diet: regular Enlive QD    Allergies    No Known Allergies    Intolerances    ANTIMICROBIALS  acyclovir   Oral Tab/Cap 400 milliGRAM(s) Oral every 8 hours  ampicillin/sulbactam  IVPB      ampicillin/sulbactam  IVPB 3 Gram(s) IV Intermittent every 6 hours  atovaquone  Suspension 1500 milliGRAM(s) Oral daily  caspofungin IVPB      caspofungin IVPB 50 milliGRAM(s) IV Intermittent every 24 hours      HEME/ONC MEDICATIONS  eltrombopag 150 milliGRAM(s) Oral daily      STANDING MEDICATIONS  Biotene Dry Mouth Oral Rinse 15 milliLiter(s) Swish and Spit four times a day  calcium acetate 667 milliGRAM(s) Oral four times a day with meals  chlorhexidine 2% Cloths 1 Application(s) Topical <User Schedule>  cycloSPORINE  (SandIMMUNE) 450 milliGRAM(s) Oral every 12 hours  famotidine    Tablet 20 milliGRAM(s) Oral daily  fluticasone propionate 50 MICROgram(s)/spray Nasal Spray 1 Spray(s) Both Nostrils two times a day  loratadine 10 milliGRAM(s) Oral daily  methylPREDNISolone sodium succinate IVPB 500 milliGRAM(s) IV Intermittent once  predniSONE   Tablet 40 milliGRAM(s) Oral daily  sodium chloride 0.65% Nasal 1 Spray(s) Both Nostrils four times a day  sodium chloride 0.9%. 1000 milliLiter(s) IV Continuous <Continuous>      PRN MEDICATIONS  acetaminophen     Tablet .. 650 milliGRAM(s) Oral every 6 hours PRN  acetaminophen   IVPB .. 1000 milliGRAM(s) IV Intermittent once PRN  aluminum hydroxide/magnesium hydroxide/simethicone Suspension 30 milliLiter(s) Oral every 4 hours PRN  calcium carbonate    500 mG (Tums) Chewable 1 Tablet(s) Chew every 4 hours PRN  diphenhydrAMINE 25 milliGRAM(s) Oral every 12 hours PRN  diphenhydrAMINE Injectable 25 milliGRAM(s) IV Push once PRN  EPINEPHrine     1 mG/mL Injectable 0.3 milliGRAM(s) IntraMuscular once PRN  hydrocortisone sodium succinate Injectable 50 milliGRAM(s) IV Push every 12 hours PRN  metoclopramide Injectable 10 milliGRAM(s) IV Push every 6 hours PRN  ondansetron Injectable 8 milliGRAM(s) IV Push every 8 hours PRN  oxyCODONE    IR 5 milliGRAM(s) Oral every 6 hours PRN    PHYSICAL EXAM  General: adult in NAD  HEENT: clear oropharynx, sinus tenderness resolved.  CV: normal S1/S2 RRR  Lungs: clear to auscultation, no wheezes, no rales  Abdomen: soft non-tender non-distended  Ext: no edema in BLE  Skin: no rash  Neuro: alert and oriented X 4  Central Line: RUE PICC  c/d/i    Vital Signs Last 24 Hrs  T(C): 36.9 (18 Jan 2022 05:33), Max: 36.9 (17 Jan 2022 21:30)  T(F): 98.5 (18 Jan 2022 05:33), Max: 98.5 (18 Jan 2022 05:33)  HR: 90 (18 Jan 2022 05:33) (80 - 106)  BP: 126/75 (18 Jan 2022 05:33) (120/77 - 142/86)  BP(mean): --  RR: 16 (18 Jan 2022 05:33) (16 - 18)  SpO2: 95% (18 Jan 2022 05:33) (95% - 98%)    LABS:                 6.4    0.48  )-----------( 29       ( 18 Jan 2022 08:21 )             17.8         Mean Cell Volume : 84.0 fl  Mean Cell Hemoglobin : 30.2 pg  Mean Cell Hemoglobin Concentration : 36.0 gm/dL  Auto Neutrophil # : x  Auto Lymphocyte # : x  Auto Monocyte # : x  Auto Eosinophil # : x  Auto Basophil # : x  Auto Neutrophil % : x  Auto Lymphocyte % : x  Auto Monocyte % : x  Auto Eosinophil % : x  Auto Basophil % : x      01-18    140  |  103  |  23  ----------------------------<  95  3.9   |  26  |  0.91    Ca    8.8      18 Jan 2022 08:21  Phos  5.2     01-18  Mg     1.6     01-18    TPro  5.9<L>  /  Alb  3.6  /  TBili  1.2  /  DBili  x   /  AST  10  /  ALT  64<H>  /  AlkPhos  65  01-18    PT/INR - ( 18 Jan 2022 08:21 )   PT: 12.0 sec;   INR: 1.00 ratio    PTT - ( 18 Jan 2022 08:21 )  PTT:20.7 sec      Uric Acid 2.9

## 2022-01-18 NOTE — PROGRESS NOTE ADULT - PROBLEM SELECTOR PLAN 2
Neutropenic, afebrile   continue diflucan, Acyclovir, Mepron.  CT CAP  to look for source of infection- No CT evidence for source of infection in the chest, abdomen or pelvis.  12/24- Staph aureus PCR detected, will start Vanco 1 gm Q 12 hrs stopped 12/26. CT maxillo facial did not reveal any abscess.   1/5 if fever recurs will discontinue Diflucan and start voriconazole as per ID.  1/9- CT sinus with contrast shows pansinusitis, ENT consulted started on Unasyn 3gm Q 6 hrs, d/adriana Cefepime and Zithromax, continue Unasyn for 10 days thru 1/19.  ID following  Pan cx CXR if fever  1/13 switch Diflucan to Caspofungin d/t transaminitis Neutropenic, afebrile   Continue acyclovir. Change caspo to posa 1/18 12/24- Staph aureus PCR detected, will start Vanco 1 gm Q 12 hrs stopped 12/26. CT maxillo facial did not reveal any abscess.   1/9- CT sinus with contrast shows pansinusitis, ENT consulted started on Unasyn 3gm Q 6 hrs, d/adriana Cefepime and Zithromax, continue Unasyn for 10 days thru 1/19.  ID following  Pan cx CXR if fever

## 2022-01-18 NOTE — PROGRESS NOTE ADULT - PROBLEM SELECTOR PLAN 3
12/24-  CT maxillofacial with contrast and CT neck with contrast to r/o abscess. (-)  12/26- pain control., First mouth wash, local Lidocaine adm with Q tips.  1/11 resolved, First mouth wash &  local Lidocaine discontinued no Lovenox DVT PPX due to thrombocytopenia  Encourage OOB and ambulation

## 2022-01-18 NOTE — PROGRESS NOTE ADULT - ATTENDING COMMENTS
36M with Aplastic Anemia diagnosed April 2020.  Underwent treatment with equine ATG, Cyclosporine, and Promacta.  Went into remission (BM bx 5/2021 with normal morphology and cellularity).  Promacta stopped May 2021.  Cyclosporin stopped August 2021.  By September, platelets started to drop.  Now with pancytopenia and neutropenic fever.  No obvious focus of infection. On cefepime, diflucan, acyclovir and mepron.  On cyclosporine 300 mg BID (started on 12/26/21), first level on 12/29/21 --197 increased dose to 325mg bid, repeat level  133, increasing CsA to 350 q12hr.   Today is cyclosporine day 21, day 16 rATG      - Completed rATG and continues with prednisone and Promacta.  - Feels well today, requiring transfusions less frequently-  - CT sinuses with pansinutitis, appreciate ENT recs, on unasyn through wednesday   - csa changed to 450q12, level went down today, will look at tomorrow and adjust accordingly. if still low will increase to 475q12  - Transaminitis improved, switched to caspofungin 1/13/22  -if infection improved, consider dc planning Thursday If transfusion requirements are ok for dc 36M with Aplastic Anemia diagnosed April 2020.  Underwent treatment with equine ATG, Cyclosporine, and Promacta.  Went into remission (BM bx 5/2021 with normal morphology and cellularity).  Promacta stopped May 2021.  Cyclosporin stopped August 2021.  By September, platelets started to drop.  Now with pancytopenia and neutropenic fever.  No obvious focus of infection. On cefepime, diflucan, acyclovir and mepron.  On cyclosporine 300 mg BID (started on 12/26/21), first level on 12/29/21 --197 increased dose to 325mg bid, repeat level  133, increasing CsA to 350 q12hr.   Today is cyclosporine day 23, day 17 rATG    - Completed rATG and continues with prednisone and Promacta.  - Feels well today, requiring transfusions less frequently  - CT sinuses with pansinutitis, appreciate ENT recs, on unasyn through Wednesday 1/19  - Csa level still low (161) - will increase to 475q12 starting tonight and repeat level on Thurs  - Transaminitis improved, switched to caspofungin 1/13/22. Will change to posaconazole for dispo planning  - consider d/c planning for Thursday

## 2022-01-18 NOTE — PROGRESS NOTE ADULT - PROBLEM SELECTOR PLAN 5
1/8- Tenderness + at the sinus, will check CT sinus with contrat.  1/8- Flonase and Claritin.  1/9- CT Sinus with contrast shows pansinusitis-   Continue ocean spray TID  Started Unasyn for pansinusitis   1/10- D/adriana Afrin on 1/10 as patient developed worsening sinus pain/discomfort.  -If symptoms persist repeat CT scan or order an MRI  -ENT following  1/11 sinus pressure improving LFT trending down  Will monitor closely.

## 2022-01-19 LAB
ALBUMIN SERPL ELPH-MCNC: 3.7 G/DL — SIGNIFICANT CHANGE UP (ref 3.3–5)
ALP SERPL-CCNC: 70 U/L — SIGNIFICANT CHANGE UP (ref 40–120)
ALT FLD-CCNC: 55 U/L — HIGH (ref 10–45)
ANION GAP SERPL CALC-SCNC: 13 MMOL/L — SIGNIFICANT CHANGE UP (ref 5–17)
APTT BLD: 19 SEC — LOW (ref 27.5–35.5)
AST SERPL-CCNC: 9 U/L — LOW (ref 10–40)
BASOPHILS # BLD AUTO: 0.01 K/UL — SIGNIFICANT CHANGE UP (ref 0–0.2)
BASOPHILS NFR BLD AUTO: 1 % — SIGNIFICANT CHANGE UP (ref 0–2)
BILIRUB SERPL-MCNC: 1.4 MG/DL — HIGH (ref 0.2–1.2)
BUN SERPL-MCNC: 20 MG/DL — SIGNIFICANT CHANGE UP (ref 7–23)
CALCIUM SERPL-MCNC: 9 MG/DL — SIGNIFICANT CHANGE UP (ref 8.4–10.5)
CHLORIDE SERPL-SCNC: 103 MMOL/L — SIGNIFICANT CHANGE UP (ref 96–108)
CO2 SERPL-SCNC: 24 MMOL/L — SIGNIFICANT CHANGE UP (ref 22–31)
CREAT SERPL-MCNC: 0.97 MG/DL — SIGNIFICANT CHANGE UP (ref 0.5–1.3)
EOSINOPHIL # BLD AUTO: 0.01 K/UL — SIGNIFICANT CHANGE UP (ref 0–0.5)
EOSINOPHIL NFR BLD AUTO: 2.1 % — SIGNIFICANT CHANGE UP (ref 0–6)
FIBRINOGEN PPP-MCNC: 402 MG/DL — SIGNIFICANT CHANGE UP (ref 290–520)
GLUCOSE SERPL-MCNC: 107 MG/DL — HIGH (ref 70–99)
HCT VFR BLD CALC: 20.5 % — CRITICAL LOW (ref 39–50)
HGB BLD-MCNC: 7.3 G/DL — LOW (ref 13–17)
INR BLD: 0.95 RATIO — SIGNIFICANT CHANGE UP (ref 0.88–1.16)
LDH SERPL L TO P-CCNC: 193 U/L — SIGNIFICANT CHANGE UP (ref 50–242)
LYMPHOCYTES # BLD AUTO: 0.18 K/UL — LOW (ref 1–3.3)
LYMPHOCYTES # BLD AUTO: 29.9 % — SIGNIFICANT CHANGE UP (ref 13–44)
MAGNESIUM SERPL-MCNC: 1.6 MG/DL — SIGNIFICANT CHANGE UP (ref 1.6–2.6)
MANUAL SMEAR VERIFICATION: SIGNIFICANT CHANGE UP
MCHC RBC-ENTMCNC: 29.3 PG — SIGNIFICANT CHANGE UP (ref 27–34)
MCHC RBC-ENTMCNC: 35.6 GM/DL — SIGNIFICANT CHANGE UP (ref 32–36)
MCV RBC AUTO: 82.3 FL — SIGNIFICANT CHANGE UP (ref 80–100)
METAMYELOCYTES # FLD: 1 % — HIGH (ref 0–0)
MONOCYTES # BLD AUTO: 0.1 K/UL — SIGNIFICANT CHANGE UP (ref 0–0.9)
MONOCYTES NFR BLD AUTO: 16.5 % — HIGH (ref 2–14)
MYELOCYTES NFR BLD: 1 % — HIGH (ref 0–0)
NEUTROPHILS # BLD AUTO: 0.29 K/UL — LOW (ref 1.8–7.4)
NEUTROPHILS NFR BLD AUTO: 48.5 % — SIGNIFICANT CHANGE UP (ref 43–77)
PHOSPHATE SERPL-MCNC: 5.5 MG/DL — HIGH (ref 2.5–4.5)
PLAT MORPH BLD: NORMAL — SIGNIFICANT CHANGE UP
PLATELET # BLD AUTO: 26 K/UL — LOW (ref 150–400)
POTASSIUM SERPL-MCNC: 3.7 MMOL/L — SIGNIFICANT CHANGE UP (ref 3.5–5.3)
POTASSIUM SERPL-SCNC: 3.7 MMOL/L — SIGNIFICANT CHANGE UP (ref 3.5–5.3)
PROT SERPL-MCNC: 6 G/DL — SIGNIFICANT CHANGE UP (ref 6–8.3)
PROTHROM AB SERPL-ACNC: 11.4 SEC — SIGNIFICANT CHANGE UP (ref 10.6–13.6)
RBC # BLD: 2.49 M/UL — LOW (ref 4.2–5.8)
RBC # FLD: 12.7 % — SIGNIFICANT CHANGE UP (ref 10.3–14.5)
RBC BLD AUTO: SIGNIFICANT CHANGE UP
SODIUM SERPL-SCNC: 140 MMOL/L — SIGNIFICANT CHANGE UP (ref 135–145)
URATE SERPL-MCNC: 2.5 MG/DL — LOW (ref 3.4–8.8)
WBC # BLD: 0.59 K/UL — CRITICAL LOW (ref 3.8–10.5)
WBC # FLD AUTO: 0.59 K/UL — CRITICAL LOW (ref 3.8–10.5)

## 2022-01-19 PROCEDURE — 99232 SBSQ HOSP IP/OBS MODERATE 35: CPT | Mod: GC

## 2022-01-19 RX ADMIN — Medication 400 MILLIGRAM(S): at 21:01

## 2022-01-19 RX ADMIN — ATOVAQUONE 1500 MILLIGRAM(S): 750 SUSPENSION ORAL at 11:41

## 2022-01-19 RX ADMIN — FAMOTIDINE 20 MILLIGRAM(S): 10 INJECTION INTRAVENOUS at 11:41

## 2022-01-19 RX ADMIN — LORATADINE 10 MILLIGRAM(S): 10 TABLET ORAL at 11:41

## 2022-01-19 RX ADMIN — Medication 15 MILLILITER(S): at 17:14

## 2022-01-19 RX ADMIN — Medication 15 MILLILITER(S): at 11:37

## 2022-01-19 RX ADMIN — Medication 40 MILLIGRAM(S): at 06:31

## 2022-01-19 RX ADMIN — AMPICILLIN SODIUM AND SULBACTAM SODIUM 200 GRAM(S): 250; 125 INJECTION, POWDER, FOR SUSPENSION INTRAMUSCULAR; INTRAVENOUS at 11:37

## 2022-01-19 RX ADMIN — Medication 1 SPRAY(S): at 17:14

## 2022-01-19 RX ADMIN — AMPICILLIN SODIUM AND SULBACTAM SODIUM 200 GRAM(S): 250; 125 INJECTION, POWDER, FOR SUSPENSION INTRAMUSCULAR; INTRAVENOUS at 06:34

## 2022-01-19 RX ADMIN — AMPICILLIN SODIUM AND SULBACTAM SODIUM 200 GRAM(S): 250; 125 INJECTION, POWDER, FOR SUSPENSION INTRAMUSCULAR; INTRAVENOUS at 17:18

## 2022-01-19 RX ADMIN — CYCLOSPORINE 475 MILLIGRAM(S): 100 CAPSULE ORAL at 06:32

## 2022-01-19 RX ADMIN — Medication 667 MILLIGRAM(S): at 09:48

## 2022-01-19 RX ADMIN — Medication 1 SPRAY(S): at 17:15

## 2022-01-19 RX ADMIN — Medication 50 MILLIGRAM(S): at 12:32

## 2022-01-19 RX ADMIN — CHLORHEXIDINE GLUCONATE 1 APPLICATION(S): 213 SOLUTION TOPICAL at 09:48

## 2022-01-19 RX ADMIN — Medication 1 SPRAY(S): at 11:37

## 2022-01-19 RX ADMIN — Medication 15 MILLILITER(S): at 23:13

## 2022-01-19 RX ADMIN — Medication 25 MILLIGRAM(S): at 12:31

## 2022-01-19 RX ADMIN — ELTROMBOPAG OLAMINE 150 MILLIGRAM(S): 50 TABLET, FILM COATED ORAL at 12:32

## 2022-01-19 RX ADMIN — POSACONAZOLE 300 MILLIGRAM(S): 100 TABLET, DELAYED RELEASE ORAL at 11:41

## 2022-01-19 RX ADMIN — Medication 1 SPRAY(S): at 06:33

## 2022-01-19 RX ADMIN — Medication 400 MILLIGRAM(S): at 13:39

## 2022-01-19 RX ADMIN — Medication 15 MILLILITER(S): at 06:33

## 2022-01-19 RX ADMIN — CYCLOSPORINE 475 MILLIGRAM(S): 100 CAPSULE ORAL at 17:16

## 2022-01-19 RX ADMIN — Medication 400 MILLIGRAM(S): at 06:31

## 2022-01-19 NOTE — PROGRESS NOTE ADULT - PROBLEM SELECTOR PLAN 2
Neutropenic, afebrile   Continue acyclovir. Change caspo to posa 1/18 12/24- Staph aureus PCR detected, will start Vanco 1 gm Q 12 hrs stopped 12/26. CT maxillo facial did not reveal any abscess.   1/9- CT sinus with contrast shows pansinusitis, ENT consulted started on Unasyn 3gm Q 6 hrs, d/adriana Cefepime and Zithromax, continue Unasyn for 10 days thru 1/19.  ID following  Pan cx CXR if fever Neutropenic, afebrile   Continue acyclovir. Change caspo to posa 1/18 12/24- Staph aureus PCR detected, will start Vanco 1 gm Q 12 hrs stopped 12/26. CT maxillo facial did not reveal any abscess.   ID following  Pan cx CXR if fever Neutropenic, afebrile   Will start levaquin for ppx on 1/20 as patient finishing Unasyn for sinusitis 1/19.  Continue acyclovir. Change caspo to posa 1/18 12/24- Staph aureus PCR detected, will start Vanco 1 gm Q 12 hrs stopped 12/26. CT maxillo facial did not reveal any abscess.   ID following  Pan cx CXR if fever

## 2022-01-19 NOTE — PROGRESS NOTE ADULT - PROBLEM SELECTOR PLAN 4
1/9- CT Sinus with contrast shows pansinusitis-   Continue ocean spray TID  Started Unasyn for pansinusitis thru 1/19  ENT following  Sinus pressure improving

## 2022-01-19 NOTE — PROGRESS NOTE ADULT - ATTENDING COMMENTS
36M with Aplastic Anemia diagnosed April 2020.  Underwent treatment with equine ATG, Cyclosporine, and Promacta.  Went into remission (BM bx 5/2021 with normal morphology and cellularity).  Promacta stopped May 2021.  Cyclosporin stopped August 2021.  By September, platelets started to drop.  Now with pancytopenia and neutropenic fever.  No obvious focus of infection. On cefepime, diflucan, acyclovir and mepron.  On cyclosporine 300 mg BID (started on 12/26/21), first level on 12/29/21 --197 increased dose to 325mg bid, repeat level  133, increasing CsA to 350 q12hr.   Today is cyclosporine day 23, day 17 rATG    - Completed rATG and continues with prednisone and Promacta.  - Feels well today, requiring transfusions less frequently  - CT sinuses with pansinutitis, appreciate ENT recs, on unasyn through Wednesday 1/19  - Csa level still low (161) - will increase to 475q12 starting tonight and repeat level on Thurs  - Transaminitis improved, switched to caspofungin 1/13/22. Will change to posaconazole for dispo planning  - consider d/c planning for Thursday 36M with Aplastic Anemia diagnosed April 2020.  Underwent treatment with equine ATG, Cyclosporine, and Promacta.  Went into remission (BM bx 5/2021 with normal morphology and cellularity).  Promacta stopped May 2021.  Cyclosporin stopped August 2021.  By September, platelets started to drop.  Now with pancytopenia and neutropenic fever.  No obvious focus of infection. On cefepime, diflucan, acyclovir and mepron.  On cyclosporine 300 mg BID (started on 12/26/21), first level on 12/29/21 --197 increased dose to 325mg bid, repeat level  133, increasing CsA to 350 q12hr.   Today is cyclosporine day 24, day 18 rATG    - Completed rATG and continues with prednisone and Promacta.  - Feels well today, requiring transfusions less frequently  - CT sinuses with pansinutitis, appreciate ENT recs, on unasyn through Wednesday 1/19; significantly improved symptoms  - Csa level still low (161) - continue dose 475q12 starting tonight and repeat level on Thurs  - Transaminitis improved, switched to caspofungin 1/13/22. Will change to posaconazole for dispo planning  - consider d/c planning for Thursday

## 2022-01-19 NOTE — PROVIDER CONTACT NOTE (CRITICAL VALUE NOTIFICATION) - ASSESSMENT
a/ox4
alert  weakness
no active bleeding
no s/s of bleeding, sob, chest pain
Pt alert and oriented x4, vss, no active bleeding
NAD
A/Ox4, VSS. No signs of bleeding/bruising, SOB. No signs of acute distress.
w/ on & off fever/no bleeding
Pt alert and oriented X4, VSS, Pt denies SOB, chest pain,
no bleedin/afebrile
A/Ox4, VSS. No signs of bleeding/bruising, SOB. No signs of acute distress.

## 2022-01-19 NOTE — PROVIDER CONTACT NOTE (CRITICAL VALUE NOTIFICATION) - SITUATION
Patient H/H-6.9/18.8 Plt=7
Plt 17
hct 20.9 ,plat 15
hgb/hct 7.1/20.2
PLT 8
hgb 6.4, hct 18.1
HCT 20.3, PLAT 20
HCT 20
low blood counts
low hct
Patient H/H=6.2/16.8 plt=19

## 2022-01-19 NOTE — PROGRESS NOTE ADULT - SUBJECTIVE AND OBJECTIVE BOX
Diagnosis: Relapsed aplastic anemia    Protocol/Chemo Regimen:  Rabbit ATG/Cyclosporine/prednisone/Promacta     Day:  Rabbit ATG day  18 (D2 suspended due to reaction; resumed on 1/3)     Pt endorsed: No complaints.     Review of Systems: Denies any chest pain, palpitation, SOB, abdominal pain or dysuria.    Pain scale: Denies                                         Diet: regular Enlive QD    Allergies    No Known Allergies    Intolerances        ANTIMICROBIALS  acyclovir   Oral Tab/Cap 400 milliGRAM(s) Oral every 8 hours  ampicillin/sulbactam  IVPB 3 Gram(s) IV Intermittent every 6 hours  ampicillin/sulbactam  IVPB      atovaquone  Suspension 1500 milliGRAM(s) Oral daily  posaconazole DR Tablet 300 milliGRAM(s) Oral daily      HEME/ONC MEDICATIONS  eltrombopag 150 milliGRAM(s) Oral daily      STANDING MEDICATIONS  Biotene Dry Mouth Oral Rinse 15 milliLiter(s) Swish and Spit four times a day  calcium acetate 667 milliGRAM(s) Oral three times a day with meals  chlorhexidine 2% Cloths 1 Application(s) Topical <User Schedule>  cycloSPORINE  (SandIMMUNE) 475 milliGRAM(s) Oral every 12 hours  famotidine    Tablet 20 milliGRAM(s) Oral daily  fluticasone propionate 50 MICROgram(s)/spray Nasal Spray 1 Spray(s) Both Nostrils two times a day  loratadine 10 milliGRAM(s) Oral daily  methylPREDNISolone sodium succinate IVPB 500 milliGRAM(s) IV Intermittent once  predniSONE   Tablet 40 milliGRAM(s) Oral daily  sodium chloride 0.65% Nasal 1 Spray(s) Both Nostrils four times a day  sodium chloride 0.9%. 1000 milliLiter(s) IV Continuous <Continuous>      PRN MEDICATIONS  acetaminophen     Tablet .. 650 milliGRAM(s) Oral every 6 hours PRN  acetaminophen   IVPB .. 1000 milliGRAM(s) IV Intermittent once PRN  aluminum hydroxide/magnesium hydroxide/simethicone Suspension 30 milliLiter(s) Oral every 4 hours PRN  calcium carbonate    500 mG (Tums) Chewable 1 Tablet(s) Chew every 4 hours PRN  diphenhydrAMINE 25 milliGRAM(s) Oral every 12 hours PRN  diphenhydrAMINE Injectable 25 milliGRAM(s) IV Push once PRN  EPINEPHrine     1 mG/mL Injectable 0.3 milliGRAM(s) IntraMuscular once PRN  hydrocortisone sodium succinate Injectable 50 milliGRAM(s) IV Push every 12 hours PRN  metoclopramide Injectable 10 milliGRAM(s) IV Push every 6 hours PRN  ondansetron Injectable 8 milliGRAM(s) IV Push every 8 hours PRN  oxyCODONE    IR 5 milliGRAM(s) Oral every 6 hours PRN        Vital Signs Last 24 Hrs  T(C): 36.8 (19 Jan 2022 05:06), Max: 36.9 (19 Jan 2022 00:37)  T(F): 98.3 (19 Jan 2022 05:06), Max: 98.4 (19 Jan 2022 00:37)  HR: 89 (19 Jan 2022 05:06) (80 - 100)  BP: 147/78 (19 Jan 2022 05:06) (121/68 - 147/78)  BP(mean): --  RR: 18 (19 Jan 2022 05:06) (18 - 18)  SpO2: 95% (19 Jan 2022 05:06) (95% - 97%)        RECENT CULTURES:        LABS:                        6.4    0.48  )-----------( 29       ( 18 Jan 2022 08:21 )             17.8         Mean Cell Volume : 84.0 fl  Mean Cell Hemoglobin : 30.2 pg  Mean Cell Hemoglobin Concentration : 36.0 gm/dL  Auto Neutrophil # : x  Auto Lymphocyte # : x  Auto Monocyte # : x  Auto Eosinophil # : x  Auto Basophil # : x  Auto Neutrophil % : x  Auto Lymphocyte % : x  Auto Monocyte % : x  Auto Eosinophil % : x  Auto Basophil % : x      01-18    140  |  103  |  23  ----------------------------<  95  3.9   |  26  |  0.91    Ca    8.8      18 Jan 2022 08:21  Phos  5.2     01-18  Mg     1.6     01-18    TPro  5.9<L>  /  Alb  3.6  /  TBili  1.2  /  DBili  x   /  AST  10  /  ALT  64<H>  /  AlkPhos  65  01-18      Mg 1.6  Phos 5.2      PT/INR - ( 18 Jan 2022 08:21 )   PT: 12.0 sec;   INR: 1.00 ratio         PTT - ( 18 Jan 2022 08:21 )  PTT:20.7 sec      Uric Acid 2.9        RADIOLOGY & ADDITIONAL STUDIES:         Diagnosis: Relapsed aplastic anemia    Protocol/Chemo Regimen:  Rabbit ATG/Cyclosporine/prednisone/Promacta     Day:  Rabbit ATG day  18 (D2 suspended due to reaction; resumed on 1/3)     Pt endorsed: No complaints.     Review of Systems: Denies any chest pain, palpitation, SOB, abdominal pain or dysuria.    Pain scale: Denies                                         Diet: regular Enlive QD    Allergies    No Known Allergies    Intolerances        ANTIMICROBIALS  acyclovir   Oral Tab/Cap 400 milliGRAM(s) Oral every 8 hours  ampicillin/sulbactam  IVPB 3 Gram(s) IV Intermittent every 6 hours  ampicillin/sulbactam  IVPB      atovaquone  Suspension 1500 milliGRAM(s) Oral daily  posaconazole DR Tablet 300 milliGRAM(s) Oral daily      HEME/ONC MEDICATIONS  eltrombopag 150 milliGRAM(s) Oral daily      STANDING MEDICATIONS  Biotene Dry Mouth Oral Rinse 15 milliLiter(s) Swish and Spit four times a day  calcium acetate 667 milliGRAM(s) Oral three times a day with meals  chlorhexidine 2% Cloths 1 Application(s) Topical <User Schedule>  cycloSPORINE  (SandIMMUNE) 475 milliGRAM(s) Oral every 12 hours  famotidine    Tablet 20 milliGRAM(s) Oral daily  fluticasone propionate 50 MICROgram(s)/spray Nasal Spray 1 Spray(s) Both Nostrils two times a day  loratadine 10 milliGRAM(s) Oral daily  methylPREDNISolone sodium succinate IVPB 500 milliGRAM(s) IV Intermittent once  predniSONE   Tablet 40 milliGRAM(s) Oral daily  sodium chloride 0.65% Nasal 1 Spray(s) Both Nostrils four times a day  sodium chloride 0.9%. 1000 milliLiter(s) IV Continuous <Continuous>      PRN MEDICATIONS  acetaminophen     Tablet .. 650 milliGRAM(s) Oral every 6 hours PRN  acetaminophen   IVPB .. 1000 milliGRAM(s) IV Intermittent once PRN  aluminum hydroxide/magnesium hydroxide/simethicone Suspension 30 milliLiter(s) Oral every 4 hours PRN  calcium carbonate    500 mG (Tums) Chewable 1 Tablet(s) Chew every 4 hours PRN  diphenhydrAMINE 25 milliGRAM(s) Oral every 12 hours PRN  diphenhydrAMINE Injectable 25 milliGRAM(s) IV Push once PRN  EPINEPHrine     1 mG/mL Injectable 0.3 milliGRAM(s) IntraMuscular once PRN  hydrocortisone sodium succinate Injectable 50 milliGRAM(s) IV Push every 12 hours PRN  metoclopramide Injectable 10 milliGRAM(s) IV Push every 6 hours PRN  ondansetron Injectable 8 milliGRAM(s) IV Push every 8 hours PRN  oxyCODONE    IR 5 milliGRAM(s) Oral every 6 hours PRN    PHYSICAL EXAM  General: adult in NAD  HEENT: clear oropharynx, sinus tenderness resolved.  CV: normal S1/S2 RRR  Lungs: clear to auscultation, no wheezes, no rales  Abdomen: soft non-tender non-distended  Ext: no edema in BLE  Skin: no rash  Neuro: alert and oriented X 4  Central Line: RUE PICC  c/d/i    Vital Signs Last 24 Hrs  T(C): 36.8 (19 Jan 2022 05:06), Max: 36.9 (19 Jan 2022 00:37)  T(F): 98.3 (19 Jan 2022 05:06), Max: 98.4 (19 Jan 2022 00:37)  HR: 89 (19 Jan 2022 05:06) (80 - 100)  BP: 147/78 (19 Jan 2022 05:06) (121/68 - 147/78)  BP(mean): --  RR: 18 (19 Jan 2022 05:06) (18 - 18)  SpO2: 95% (19 Jan 2022 05:06) (95% - 97%)    LABS:                        Diagnosis: Relapsed aplastic anemia    Protocol/Chemo Regimen:  Rabbit ATG/Cyclosporine/prednisone/Promacta     Day:  Rabbit ATG day 18 (D2 suspended due to reaction; resumed on 1/3)     Pt endorsed: No complaints.     Review of Systems: Denies any chest pain, palpitation, SOB, abdominal pain or dysuria.    Pain scale: Denies                                         Diet: regular Enlive QD    Allergies    No Known Allergies    Intolerances    ANTIMICROBIALS  acyclovir   Oral Tab/Cap 400 milliGRAM(s) Oral every 8 hours  ampicillin/sulbactam  IVPB 3 Gram(s) IV Intermittent every 6 hours  ampicillin/sulbactam  IVPB      atovaquone  Suspension 1500 milliGRAM(s) Oral daily  posaconazole DR Tablet 300 milliGRAM(s) Oral daily      HEME/ONC MEDICATIONS  eltrombopag 150 milliGRAM(s) Oral daily      STANDING MEDICATIONS  Biotene Dry Mouth Oral Rinse 15 milliLiter(s) Swish and Spit four times a day  calcium acetate 667 milliGRAM(s) Oral three times a day with meals  chlorhexidine 2% Cloths 1 Application(s) Topical <User Schedule>  cycloSPORINE  (SandIMMUNE) 475 milliGRAM(s) Oral every 12 hours  famotidine    Tablet 20 milliGRAM(s) Oral daily  fluticasone propionate 50 MICROgram(s)/spray Nasal Spray 1 Spray(s) Both Nostrils two times a day  loratadine 10 milliGRAM(s) Oral daily  methylPREDNISolone sodium succinate IVPB 500 milliGRAM(s) IV Intermittent once  predniSONE   Tablet 40 milliGRAM(s) Oral daily  sodium chloride 0.65% Nasal 1 Spray(s) Both Nostrils four times a day  sodium chloride 0.9%. 1000 milliLiter(s) IV Continuous <Continuous>      PRN MEDICATIONS  acetaminophen     Tablet .. 650 milliGRAM(s) Oral every 6 hours PRN  acetaminophen   IVPB .. 1000 milliGRAM(s) IV Intermittent once PRN  aluminum hydroxide/magnesium hydroxide/simethicone Suspension 30 milliLiter(s) Oral every 4 hours PRN  calcium carbonate    500 mG (Tums) Chewable 1 Tablet(s) Chew every 4 hours PRN  diphenhydrAMINE 25 milliGRAM(s) Oral every 12 hours PRN  diphenhydrAMINE Injectable 25 milliGRAM(s) IV Push once PRN  EPINEPHrine     1 mG/mL Injectable 0.3 milliGRAM(s) IntraMuscular once PRN  hydrocortisone sodium succinate Injectable 50 milliGRAM(s) IV Push every 12 hours PRN  metoclopramide Injectable 10 milliGRAM(s) IV Push every 6 hours PRN  ondansetron Injectable 8 milliGRAM(s) IV Push every 8 hours PRN  oxyCODONE    IR 5 milliGRAM(s) Oral every 6 hours PRN    PHYSICAL EXAM  General: adult in NAD  HEENT: clear oropharynx, sinus tenderness resolved.  CV: normal S1/S2 RRR  Lungs: clear to auscultation, no wheezes, no rales  Abdomen: soft non-tender non-distended  Ext: no edema in BLE  Skin: no rash  Neuro: alert and oriented X 4  Central Line: RUE PICC  c/d/i    Vital Signs Last 24 Hrs  T(C): 36.8 (19 Jan 2022 05:06), Max: 36.9 (19 Jan 2022 00:37)  T(F): 98.3 (19 Jan 2022 05:06), Max: 98.4 (19 Jan 2022 00:37)  HR: 89 (19 Jan 2022 05:06) (80 - 100)  BP: 147/78 (19 Jan 2022 05:06) (121/68 - 147/78)  BP(mean): --  RR: 18 (19 Jan 2022 05:06) (18 - 18)  SpO2: 95% (19 Jan 2022 05:06) (95% - 97%)    LABS:    Blood Cultures:                           7.3    0.59  )-----------( 26       ( 19 Jan 2022 07:16 )             20.5         Mean Cell Volume : 82.3 fl  Mean Cell Hemoglobin : 29.3 pg  Mean Cell Hemoglobin Concentration : 35.6 gm/dL  Auto Neutrophil # : 0.29 K/uL  Auto Lymphocyte # : 0.18 K/uL  Auto Monocyte # : 0.10 K/uL  Auto Eosinophil # : 0.01 K/uL  Auto Basophil # : 0.01 K/uL  Auto Neutrophil % : 48.5 %  Auto Lymphocyte % : 29.9 %  Auto Monocyte % : 16.5 %  Auto Eosinophil % : 2.1 %  Auto Basophil % : 1.0 %      01-19    140  |  103  |  20  ----------------------------<  107<H>  3.7   |  24  |  0.97    Ca    9.0      19 Jan 2022 07:16  Phos  5.5     01-19  Mg     1.6     01-19    TPro  6.0  /  Alb  3.7  /  TBili  1.4<H>  /  DBili  x   /  AST  9<L>  /  ALT  55<H>  /  AlkPhos  70  01-19  PT/INR - ( 19 Jan 2022 07:18 )   PT: 11.4 sec;   INR: 0.95 ratio    PTT - ( 19 Jan 2022 07:18 )  PTT:19.0 sec    Uric Acid 2.5

## 2022-01-19 NOTE — PROGRESS NOTE ADULT - PROBLEM SELECTOR PLAN 1
Relapsed Aplastic Anemia   s/p hATG, CSA, and promacta with complete remission in 2020, BM bx on 11/9/20 revealed hypocellular marrow.  BMbx 12/2021 showing erythroid predominance, myeloid and erythroid maturation, and markedly decreased megakaryocytes c/w relapse  12/23-   transferred  to [facility] for rATG treatment  Monitor CBC with diff/lytes, transfuse and or replete PRN, Monitor weight, I and O, mouth care.  12/26- Started Cyclosporine.  12/27 test dose - no reaction noted  12/29 Started  Rabbit ATG/prednisone-Reaction to rATG- HELD on 12/30 due to reaction ; Continue promacta  1/3 Resumed  rabbit ATG/prednisone  1/6 completed rATG.   1/10- Prednisone taper start on 1/12.  Elevated CSA level on 1/16 was likely lab error. Dose increased to 475mg 1/18 with repeat level on Thurs 1/20.   1/17- Possible D/C home on 1/120 if requiring less transfusions. Relapsed Aplastic Anemia   s/p hATG, CSA, and promacta with complete remission in 2020, BM bx on 11/9/20 revealed hypocellular marrow.  BMbx 12/2021 showing erythroid predominance, myeloid and erythroid maturation, and markedly decreased megakaryocytes c/w relapse  12/23-   transferred  to [facility] for rATG treatment  Monitor CBC with diff/lytes, transfuse and or replete PRN, Monitor weight, I and O, mouth care.  12/26- Started Cyclosporine.  12/27 test dose - no reaction noted  12/29 Started  Rabbit ATG/prednisone-Reaction to rATG- HELD on 12/30 due to reaction ; Continue promacta  1/3 Resumed  rabbit ATG/prednisone  1/6 completed rATG.   1/10- Prednisone taper start on 1/12.  Elevated CSA level on 1/16 was likely lab error-falsely elevated 404 . Dose increased to 475mg 1/18 because level was low 2 days in a row (163/161) now due for repeat level on Thurs 1/20. Relapsed Aplastic Anemia   s/p hATG, CSA, and promacta with complete remission in 2020, BM bx on 11/9/20 revealed hypocellular marrow.  BMbx 12/2021 showing erythroid predominance, myeloid and erythroid maturation, and markedly decreased megakaryocytes c/w relapse  12/23-   transferred  to [facility] for rATG treatment  Monitor CBC with diff/lytes, transfuse and or replete PRN, Monitor weight, I and O, mouth care.  12/26- Started Cyclosporine.  12/27 test dose - no reaction noted  12/29 Started  Rabbit ATG/prednisone-Reaction to rATG- HELD on 12/30 due to reaction ; Continue promacta  1/3 Resumed  rabbit ATG/prednisone, 1/6 completed rATG.   1/10- Prednisone taper 1/12-1/26  Elevated CSA level on 1/16 was likely lab error-falsely elevated 404 . Dose increased to 475mg 1/18 because level was low 2 days in a row (163/161) now due for repeat level on Thurs 1/20.

## 2022-01-19 NOTE — PROVIDER CONTACT NOTE (CRITICAL VALUE NOTIFICATION) - BACKGROUND
Aplastic anemia
Aplastic anemia
aml
pt with aplastic anemia
Aplastic anemia
aml
Aplastic anemia
Aplastic Anemia

## 2022-01-19 NOTE — PROGRESS NOTE ADULT - ASSESSMENT
Mr. Olivera is a 35 yo M with Aplastic Anemia diagnosed  April 2020, s/p equine ATG, Cyclosporine, and Promacta. Bone marrow biopsy was done on 5/2021 revealed normal morphology and cellularity, eventually Promacta and Cyclosporine was stopped in 5/2021. Then noted his platelets dropping in September 2021.   Now admitted with neutropenic fever found to have pancytopenia secondary to disease condition.  Bone marrow biopsy was repeated on 12/2021 showed decreased megakaryocytes c/w relapsed disease. Rabbit ATG/ prednisone  started on 12/29/21, ATG held starting on 12/30 due to reaction and resumed on 1/3/22. Hospital course complicated by ATG reaction, and neutropenic fever. Pt has pancytopenia secondary to chemotherapy and or disease condition.

## 2022-01-19 NOTE — PROVIDER CONTACT NOTE (CRITICAL VALUE NOTIFICATION) - RECOMMENDATIONS
PLT/PRBC TX
1 U PRBCs transfusion
monitoring for pending transfusions
Transfuse PRBCs and platelets
to get transfused
PLT TX
Transfuse PRBCs
Continue to monitor
monitor pt

## 2022-01-20 ENCOUNTER — TRANSCRIPTION ENCOUNTER (OUTPATIENT)
Age: 37
End: 2022-01-20

## 2022-01-20 VITALS
SYSTOLIC BLOOD PRESSURE: 150 MMHG | RESPIRATION RATE: 18 BRPM | OXYGEN SATURATION: 97 % | HEART RATE: 91 BPM | DIASTOLIC BLOOD PRESSURE: 77 MMHG | TEMPERATURE: 99 F

## 2022-01-20 LAB
ALBUMIN SERPL ELPH-MCNC: 3.7 G/DL — SIGNIFICANT CHANGE UP (ref 3.3–5)
ALP SERPL-CCNC: 69 U/L — SIGNIFICANT CHANGE UP (ref 40–120)
ALT FLD-CCNC: 65 U/L — HIGH (ref 10–45)
ANION GAP SERPL CALC-SCNC: 12 MMOL/L — SIGNIFICANT CHANGE UP (ref 5–17)
APTT BLD: 22.3 SEC — LOW (ref 27.5–35.5)
AST SERPL-CCNC: 21 U/L — SIGNIFICANT CHANGE UP (ref 10–40)
BASOPHILS # BLD AUTO: 0 K/UL — SIGNIFICANT CHANGE UP (ref 0–0.2)
BASOPHILS NFR BLD AUTO: 0 % — SIGNIFICANT CHANGE UP (ref 0–2)
BILIRUB DIRECT SERPL-MCNC: 0.7 MG/DL — HIGH (ref 0–0.3)
BILIRUB SERPL-MCNC: 2.4 MG/DL — HIGH (ref 0.2–1.2)
BUN SERPL-MCNC: 20 MG/DL — SIGNIFICANT CHANGE UP (ref 7–23)
CALCIUM SERPL-MCNC: 9 MG/DL — SIGNIFICANT CHANGE UP (ref 8.4–10.5)
CHLORIDE SERPL-SCNC: 104 MMOL/L — SIGNIFICANT CHANGE UP (ref 96–108)
CO2 SERPL-SCNC: 23 MMOL/L — SIGNIFICANT CHANGE UP (ref 22–31)
CREAT SERPL-MCNC: 0.93 MG/DL — SIGNIFICANT CHANGE UP (ref 0.5–1.3)
CYCLOSPORINE SER-MCNC: 386 NG/ML — SIGNIFICANT CHANGE UP (ref 150–400)
EOSINOPHIL # BLD AUTO: 0.01 K/UL — SIGNIFICANT CHANGE UP (ref 0–0.5)
EOSINOPHIL NFR BLD AUTO: 0.9 % — SIGNIFICANT CHANGE UP (ref 0–6)
FIBRINOGEN PPP-MCNC: 406 MG/DL — SIGNIFICANT CHANGE UP (ref 290–520)
GLUCOSE SERPL-MCNC: 108 MG/DL — HIGH (ref 70–99)
HCT VFR BLD CALC: 22.8 % — LOW (ref 39–50)
HGB BLD-MCNC: 8.1 G/DL — LOW (ref 13–17)
INR BLD: 1.04 RATIO — SIGNIFICANT CHANGE UP (ref 0.88–1.16)
LDH SERPL L TO P-CCNC: 217 U/L — SIGNIFICANT CHANGE UP (ref 50–242)
LYMPHOCYTES # BLD AUTO: 0.18 K/UL — LOW (ref 1–3.3)
LYMPHOCYTES # BLD AUTO: 31 % — SIGNIFICANT CHANGE UP (ref 13–44)
MAGNESIUM SERPL-MCNC: 1.6 MG/DL — SIGNIFICANT CHANGE UP (ref 1.6–2.6)
MANUAL SMEAR VERIFICATION: SIGNIFICANT CHANGE UP
MCHC RBC-ENTMCNC: 29.5 PG — SIGNIFICANT CHANGE UP (ref 27–34)
MCHC RBC-ENTMCNC: 35.5 GM/DL — SIGNIFICANT CHANGE UP (ref 32–36)
MCV RBC AUTO: 82.9 FL — SIGNIFICANT CHANGE UP (ref 80–100)
MONOCYTES # BLD AUTO: 0.09 K/UL — SIGNIFICANT CHANGE UP (ref 0–0.9)
MONOCYTES NFR BLD AUTO: 15 % — HIGH (ref 2–14)
MYELOCYTES NFR BLD: 1.8 % — HIGH (ref 0–0)
NEUTROPHILS # BLD AUTO: 0.29 K/UL — LOW (ref 1.8–7.4)
NEUTROPHILS NFR BLD AUTO: 51.3 % — SIGNIFICANT CHANGE UP (ref 43–77)
PHOSPHATE SERPL-MCNC: 5.2 MG/DL — HIGH (ref 2.5–4.5)
PLAT MORPH BLD: NORMAL — SIGNIFICANT CHANGE UP
PLATELET # BLD AUTO: 14 K/UL — CRITICAL LOW (ref 150–400)
POTASSIUM SERPL-MCNC: 3.9 MMOL/L — SIGNIFICANT CHANGE UP (ref 3.5–5.3)
POTASSIUM SERPL-SCNC: 3.9 MMOL/L — SIGNIFICANT CHANGE UP (ref 3.5–5.3)
PROT SERPL-MCNC: 6.3 G/DL — SIGNIFICANT CHANGE UP (ref 6–8.3)
PROTHROM AB SERPL-ACNC: 12.4 SEC — SIGNIFICANT CHANGE UP (ref 10.6–13.6)
RBC # BLD: 2.75 M/UL — LOW (ref 4.2–5.8)
RBC # FLD: 12.8 % — SIGNIFICANT CHANGE UP (ref 10.3–14.5)
RBC BLD AUTO: SIGNIFICANT CHANGE UP
SODIUM SERPL-SCNC: 139 MMOL/L — SIGNIFICANT CHANGE UP (ref 135–145)
URATE SERPL-MCNC: 2.2 MG/DL — LOW (ref 3.4–8.8)
WBC # BLD: 0.57 K/UL — CRITICAL LOW (ref 3.8–10.5)
WBC # FLD AUTO: 0.57 K/UL — CRITICAL LOW (ref 3.8–10.5)

## 2022-01-20 PROCEDURE — 80202 ASSAY OF VANCOMYCIN: CPT

## 2022-01-20 PROCEDURE — 36569 INSJ PICC 5 YR+ W/O IMAGING: CPT

## 2022-01-20 PROCEDURE — 86923 COMPATIBILITY TEST ELECTRIC: CPT

## 2022-01-20 PROCEDURE — 84132 ASSAY OF SERUM POTASSIUM: CPT

## 2022-01-20 PROCEDURE — P9073: CPT

## 2022-01-20 PROCEDURE — 85610 PROTHROMBIN TIME: CPT

## 2022-01-20 PROCEDURE — 85027 COMPLETE CBC AUTOMATED: CPT

## 2022-01-20 PROCEDURE — 84550 ASSAY OF BLOOD/URIC ACID: CPT

## 2022-01-20 PROCEDURE — 83735 ASSAY OF MAGNESIUM: CPT

## 2022-01-20 PROCEDURE — 81001 URINALYSIS AUTO W/SCOPE: CPT

## 2022-01-20 PROCEDURE — 82947 ASSAY GLUCOSE BLOOD QUANT: CPT

## 2022-01-20 PROCEDURE — 71045 X-RAY EXAM CHEST 1 VIEW: CPT

## 2022-01-20 PROCEDURE — P9037: CPT

## 2022-01-20 PROCEDURE — 85014 HEMATOCRIT: CPT

## 2022-01-20 PROCEDURE — 86901 BLOOD TYPING SEROLOGIC RH(D): CPT

## 2022-01-20 PROCEDURE — 36430 TRANSFUSION BLD/BLD COMPNT: CPT

## 2022-01-20 PROCEDURE — 85025 COMPLETE CBC W/AUTO DIFF WBC: CPT

## 2022-01-20 PROCEDURE — 80053 COMPREHEN METABOLIC PANEL: CPT

## 2022-01-20 PROCEDURE — 80048 BASIC METABOLIC PNL TOTAL CA: CPT

## 2022-01-20 PROCEDURE — 85018 HEMOGLOBIN: CPT

## 2022-01-20 PROCEDURE — 83615 LACTATE (LD) (LDH) ENZYME: CPT

## 2022-01-20 PROCEDURE — 80158 DRUG ASSAY CYCLOSPORINE: CPT

## 2022-01-20 PROCEDURE — 93005 ELECTROCARDIOGRAM TRACING: CPT

## 2022-01-20 PROCEDURE — 84100 ASSAY OF PHOSPHORUS: CPT

## 2022-01-20 PROCEDURE — 82803 BLOOD GASES ANY COMBINATION: CPT

## 2022-01-20 PROCEDURE — 87641 MR-STAPH DNA AMP PROBE: CPT

## 2022-01-20 PROCEDURE — 0225U NFCT DS DNA&RNA 21 SARSCOV2: CPT

## 2022-01-20 PROCEDURE — 94640 AIRWAY INHALATION TREATMENT: CPT

## 2022-01-20 PROCEDURE — 82330 ASSAY OF CALCIUM: CPT

## 2022-01-20 PROCEDURE — 83605 ASSAY OF LACTIC ACID: CPT

## 2022-01-20 PROCEDURE — 99285 EMERGENCY DEPT VISIT HI MDM: CPT

## 2022-01-20 PROCEDURE — 87086 URINE CULTURE/COLONY COUNT: CPT

## 2022-01-20 PROCEDURE — U0005: CPT

## 2022-01-20 PROCEDURE — 99239 HOSP IP/OBS DSCHRG MGMT >30: CPT

## 2022-01-20 PROCEDURE — 81003 URINALYSIS AUTO W/O SCOPE: CPT

## 2022-01-20 PROCEDURE — 70492 CT SFT TSUE NCK W/O & W/DYE: CPT

## 2022-01-20 PROCEDURE — 82248 BILIRUBIN DIRECT: CPT

## 2022-01-20 PROCEDURE — C1751: CPT

## 2022-01-20 PROCEDURE — 74177 CT ABD & PELVIS W/CONTRAST: CPT

## 2022-01-20 PROCEDURE — U0003: CPT

## 2022-01-20 PROCEDURE — 36415 COLL VENOUS BLD VENIPUNCTURE: CPT

## 2022-01-20 PROCEDURE — 84295 ASSAY OF SERUM SODIUM: CPT

## 2022-01-20 PROCEDURE — 85384 FIBRINOGEN ACTIVITY: CPT

## 2022-01-20 PROCEDURE — 82435 ASSAY OF BLOOD CHLORIDE: CPT

## 2022-01-20 PROCEDURE — 87640 STAPH A DNA AMP PROBE: CPT

## 2022-01-20 PROCEDURE — 85730 THROMBOPLASTIN TIME PARTIAL: CPT

## 2022-01-20 PROCEDURE — 85049 AUTOMATED PLATELET COUNT: CPT

## 2022-01-20 PROCEDURE — 86850 RBC ANTIBODY SCREEN: CPT

## 2022-01-20 PROCEDURE — 86900 BLOOD TYPING SEROLOGIC ABO: CPT

## 2022-01-20 PROCEDURE — 84484 ASSAY OF TROPONIN QUANT: CPT

## 2022-01-20 PROCEDURE — 87040 BLOOD CULTURE FOR BACTERIA: CPT

## 2022-01-20 PROCEDURE — 87081 CULTURE SCREEN ONLY: CPT

## 2022-01-20 PROCEDURE — P9040: CPT

## 2022-01-20 PROCEDURE — 70487 CT MAXILLOFACIAL W/DYE: CPT

## 2022-01-20 PROCEDURE — 71260 CT THORAX DX C+: CPT

## 2022-01-20 RX ORDER — CYCLOSPORINE 100 MG/1
2 CAPSULE ORAL
Qty: 120 | Refills: 3
Start: 2022-01-20 | End: 2022-05-19

## 2022-01-20 RX ORDER — FLUCONAZOLE 150 MG/1
1 TABLET ORAL
Qty: 30 | Refills: 0
Start: 2022-01-20 | End: 2022-02-18

## 2022-01-20 RX ORDER — CYCLOSPORINE 100 MG/1
1 CAPSULE ORAL
Qty: 40 | Refills: 0
Start: 2022-01-20 | End: 2022-02-08

## 2022-01-20 RX ADMIN — ATOVAQUONE 1500 MILLIGRAM(S): 750 SUSPENSION ORAL at 12:36

## 2022-01-20 RX ADMIN — Medication 50 MILLIGRAM(S): at 10:16

## 2022-01-20 RX ADMIN — FAMOTIDINE 20 MILLIGRAM(S): 10 INJECTION INTRAVENOUS at 12:33

## 2022-01-20 RX ADMIN — ELTROMBOPAG OLAMINE 150 MILLIGRAM(S): 50 TABLET, FILM COATED ORAL at 14:17

## 2022-01-20 RX ADMIN — Medication 40 MILLIGRAM(S): at 05:44

## 2022-01-20 RX ADMIN — Medication 650 MILLIGRAM(S): at 11:15

## 2022-01-20 RX ADMIN — Medication 25 MILLIGRAM(S): at 10:16

## 2022-01-20 RX ADMIN — Medication 650 MILLIGRAM(S): at 10:16

## 2022-01-20 RX ADMIN — Medication 400 MILLIGRAM(S): at 05:44

## 2022-01-20 RX ADMIN — SODIUM CHLORIDE 50 MILLILITER(S): 9 INJECTION INTRAMUSCULAR; INTRAVENOUS; SUBCUTANEOUS at 05:44

## 2022-01-20 RX ADMIN — CHLORHEXIDINE GLUCONATE 1 APPLICATION(S): 213 SOLUTION TOPICAL at 08:13

## 2022-01-20 RX ADMIN — POSACONAZOLE 300 MILLIGRAM(S): 100 TABLET, DELAYED RELEASE ORAL at 12:33

## 2022-01-20 RX ADMIN — Medication 1 SPRAY(S): at 12:26

## 2022-01-20 RX ADMIN — Medication 15 MILLILITER(S): at 12:25

## 2022-01-20 RX ADMIN — CYCLOSPORINE 475 MILLIGRAM(S): 100 CAPSULE ORAL at 05:44

## 2022-01-20 RX ADMIN — Medication 400 MILLIGRAM(S): at 14:17

## 2022-01-20 RX ADMIN — LORATADINE 10 MILLIGRAM(S): 10 TABLET ORAL at 12:33

## 2022-01-20 RX ADMIN — Medication 15 MILLILITER(S): at 05:45

## 2022-01-20 NOTE — PROGRESS NOTE ADULT - PROVIDER SPECIALTY LIST ADULT
Infectious Disease
Dental
Heme/Onc
Heme/Onc
Infectious Disease
Internal Medicine
Heme/Onc
Infectious Disease
ENT
Heme/Onc
Heme/Onc
Infectious Disease
Heme/Onc
Internal Medicine
Heme/Onc

## 2022-01-20 NOTE — PROVIDER CONTACT NOTE (CRITICAL VALUE NOTIFICATION) - PERSON GIVING RESULT:
Ada, Lab
Augustine Veliz (lab)
Augustine Ovalle
Chica Weinstein (reynaldo)
ELVIN Weinstein
Hirem Mistery
VINICIUS TRAORE/reynaldo
jeanette jackman
madelin
Chica Villa
[place], Lab
perez jackman
[place]
Augustine Lau
Veena Barker
Allen Coto
[place]
shira

## 2022-01-20 NOTE — PROVIDER CONTACT NOTE (CRITICAL VALUE NOTIFICATION) - ACTION/TREATMENT ORDERED:
1 unit of PRBCs ordered. Will continue to monitor.
No active bleeding
1 unit PLT TX/1 unit PRBC TX ordered
no orders for now
no orders received
no orders received
transfusion
1 unit of PRBCs and platelets transfused. Will continue to monitor.
1 unit plts over 1 hour
no further instructions given
will follow up
Above NP will ordere PLT TX
monitoring for pending transfusions
Np agreed with recommendation
NP agreed with recommendation

## 2022-01-20 NOTE — PROGRESS NOTE ADULT - REASON FOR ADMISSION
nosebleed and fever

## 2022-01-20 NOTE — PROGRESS NOTE ADULT - NUTRITIONAL ASSESSMENT
This patient has been assessed with a concern for Malnutrition and has been determined to have a diagnosis/diagnoses of Mild protein-calorie malnutrition.    This patient is being managed with:   Diet Regular-  Supplement Feeding Modality:  Oral  Ensure Enlive Cans or Servings Per Day:  1       Frequency:  Daily  Entered: Jan 4 2022  3:29PM    

## 2022-01-20 NOTE — DISCHARGE NOTE NURSING/CASE MANAGEMENT/SOCIAL WORK - NSDCFUADDAPPT_GEN_ALL_CORE_FT
You have an appointment with Dr. Goldberg on   You have an appointment for labs and possible platelet appointments on   You can follow up with ENT as needed for your sinusitis. Information listed above.     Dr. Goldberg is aware that the levaquin did not make you feel but it is extremely important and before he switches it to something else, he would like for you to try it again.

## 2022-01-20 NOTE — CHART NOTE - NSCHARTNOTEFT_GEN_A_CORE
Nutrition Follow Up Note  Patient seen for: Pt seen for mild malnutrition follow up.    Chart reviewed, events noted. Patient is 37 yo male with Aplastic Anemia diagnosed  2020, s/p equine ATG, Cyclosporine, and Promacta. Bone marrow biopsy was done on 2021 revealed normal morphology and cellularity, eventually Promacta and Cyclosporine was stopped in 2021. Then noted his platelets dropping in 2021.   Now admitted with neutropenic fever found to have pancytopenia secondary to disease condition.  Bone marrow biopsy was repeated on 2021 showed decreased megakaryocytes c/w relapsed disease. Rabbit ATG/ prednisone  started on 21, ATG held starting on  due to reaction and resumed on 1/3/22. Hospital course complicated by ATG reaction, and neutropenic fever. Pt has pancytopenia secondary to chemotherapy and or disease condition.    Source: [x] Patient       [x] EMR        [] RN        [] Family at bedside       [] Other:    Diet Order:   Diet, Regular:     Supplement Feeding Modality:  Oral    Ensure Enlive Cans or Servings Per Day:  1       Frequency:  Daily (22)    - Is current order appropriate/adequate? [] Yes  [x]  No:     - PO intake :   [x] >75%  Adequate    [] 50-75%  Fair       [] <50%  Poor    - Nutrition-related concerns:   Pt states good appetite/PO intake, consuming 100% of meals/supplements during length of stay. Reports no chewing/swallowing difficulty. Denies any nausea, vomiting, diarrhea, or constipation. PTA, Pt reports taking Vitamin B and C and a protein shake daily. Recommend he takes a multivitamin daily.      GI:  Last BM 22.   Bowel Regimen? [] Yes   [x] No    Weights:   Daily Weight in k.5 (-), Weight in k.2 (18), Weight in k.3 (-17), Weight in k.1 (-16), Weight in k.2 (-15)  Weights have been stable for 5 days.    Nutritionally Pertinent MEDICATIONS  (STANDING):  acyclovir   Oral Tab/Cap  atovaquone  Suspension  famotidine    Tablet  levoFLOXacin  Tablet  methylPREDNISolone sodium succinate IVPB  posaconazole DR Tablet  sodium chloride 0.9%.    Pertinent Labs:  @ 06:38: Na 139, BUN 20, Cr 0.93, <H>, K+ 3.9, Phos 5.2<H>, Mg 1.6, Alk Phos 69, ALT/SGPT 65<H>, AST/SGOT 21, HbA1c --    Skin per nursing documentation:   Edema: No edema noted per chart.    Estimated Needs:   [x] no change since previous assessment  [] recalculated:     Previous Nutrition Diagnosis: Mild malnutrition  Nutrition Diagnosis is: [x] ongoing  [] resolved [] not applicable     New Nutrition Diagnosis: [x] Not applicable    Nutrition Care Plan:  [] In Progress  [x] Achieved  [] Not applicable    Nutrition Interventions:     Education Provided: Discussed and provided High Calorie, High Protein Nutrition Therapy and Recipes.      [x] Yes:  [] No:        Recommendations:         [x] Continue current diet order as tolerated     [x] Continue oral nutrition supplement: Ensure Enlive 1/day     [x] Add micronutrient supplementation: multivitamin      [x] Encourage adequate consumption of meals/supplement to optimize protein/energy intake     [x] Continue to monitor and replete electrolytes as needed.     Monitoring and Evaluation:   Continue to monitor nutritional intake, tolerance to diet prescription, weights, labs, skin integrity    RD remains available upon request and will follow up per protocol    Melvi Amato, Dietetic Intern Nutrition Follow Up Note  Patient seen for: Pt seen for mild malnutrition follow up.    Chart reviewed, events noted. Patient is 37 yo male with Aplastic Anemia diagnosed  2020, s/p equine ATG, Cyclosporine, and Promacta. Bone marrow biopsy was done on 2021 revealed normal morphology and cellularity, eventually Promacta and Cyclosporine was stopped in 2021. Then noted his platelets dropping in 2021.   Now admitted with neutropenic fever found to have pancytopenia secondary to disease condition.  Bone marrow biopsy was repeated on 2021 showed decreased megakaryocytes c/w relapsed disease. Rabbit ATG/ prednisone  started on 21, ATG held starting on  due to reaction and resumed on 1/3/22. Hospital course complicated by ATG reaction, and neutropenic fever. Pt has pancytopenia secondary to chemotherapy and or disease condition.    Source: [x] Patient       [x] EMR        [] RN        [] Family at bedside       [] Other:    Diet Order:   Diet, Regular:     Supplement Feeding Modality:  Oral    Ensure Enlive Cans or Servings Per Day:  1       Frequency:  Daily (22)    - Is current order appropriate/adequate? [] Yes  [x]  No:     - PO intake :   [x] >75%  Adequate    [] 50-75%  Fair       [] <50%  Poor    - Nutrition-related concerns:   Pt states good appetite/PO intake, consuming 100% of meals/supplements during length of stay. Reports no chewing/swallowing difficulty. Denies any nausea, vomiting, diarrhea, or constipation. PTA, Pt reports taking Vitamin B and C and a protein shake daily. Recommend he takes a multivitamin daily.      GI:  Last BM 22.   Bowel Regimen? [] Yes   [x] No    Weights:   Daily Weight in k.5 (-), Weight in k.2 (18), Weight in k.3 (-17), Weight in k.1 (-16), Weight in k.2 (-15)  Weights have been stable for 5 days.    Nutritionally Pertinent MEDICATIONS  (STANDING):  acyclovir   Oral Tab/Cap  atovaquone  Suspension  famotidine    Tablet  levoFLOXacin  Tablet  methylPREDNISolone sodium succinate IVPB  posaconazole DR Tablet  sodium chloride 0.9%.    Pertinent Labs:  @ 06:38: Na 139, BUN 20, Cr 0.93, <H>, K+ 3.9, Phos 5.2<H>, Mg 1.6, Alk Phos 69, ALT/SGPT 65<H>, AST/SGOT 21, HbA1c --    Skin per nursing documentation:   Edema: No edema noted per chart.    No pressure ulcers noted per chart    Estimated Needs:   [x] no change since previous assessment  [] recalculated:     Previous Nutrition Diagnosis: Mild malnutrition  Nutrition Diagnosis is: [x] ongoing  [] resolved [] not applicable     New Nutrition Diagnosis: [x] Not applicable    Nutrition Care Plan:  [] In Progress  [x] Achieved  [] Not applicable    Nutrition Interventions:     Education Provided: Discussed and provided High Calorie, High Protein Nutrition Therapy and Recipes.      [x] Yes:  [] No:        Recommendations:         [x] Continue current diet order as tolerated     [x] Continue oral nutrition supplement: Ensure Enlive 1/day     [x] Add micronutrient supplementation: multivitamin      [x] Encourage adequate consumption of meals/supplement to optimize protein/energy intake     [x] Continue to monitor and replete electrolytes as needed.     Monitoring and Evaluation:   Continue to monitor nutritional intake, tolerance to diet prescription, weights, labs, skin integrity    RD remains available upon request and will follow up per protocol    Melvi Amato, Dietetic Intern Nutrition Follow Up Note  Patient seen for: Pt seen for mild malnutrition follow up.    Chart reviewed, events noted. Patient is 37 yo male with Aplastic Anemia diagnosed  2020, s/p equine ATG, Cyclosporine, and Promacta. Bone marrow biopsy was done on 2021 revealed normal morphology and cellularity, eventually Promacta and Cyclosporine was stopped in 2021. Then noted his platelets dropping in 2021.   Now admitted with neutropenic fever found to have pancytopenia secondary to disease condition.  Bone marrow biopsy was repeated on 2021 showed decreased megakaryocytes c/w relapsed disease. Rabbit ATG/ prednisone  started on 21, ATG held starting on  due to reaction and resumed on 1/3/22. Hospital course complicated by ATG reaction, and neutropenic fever. Pt has pancytopenia secondary to chemotherapy and or disease condition.    Source: [x] Patient       [x] EMR        [] RN        [] Family at bedside       [] Other:    Diet Order:   Diet, Regular:     Supplement Feeding Modality:  Oral    Ensure Enlive Cans or Servings Per Day:  1       Frequency:  Daily (22)    - Is current order appropriate/adequate? [] Yes  [x]  No:     - PO intake :   [x] >75%  Adequate    [] 50-75%  Fair       [] <50%  Poor    - Nutrition-related concerns:   Pt states good appetite/PO intake, consuming 100% of meals/supplements during length of stay. Reports no chewing/swallowing difficulty. Denies any nausea, vomiting, diarrhea, or constipation. PTA, Pt reports taking vitamin B and C and a protein shake daily. Recommended Pt speak with Oncologist about continuing vitamins.    GI:  Last BM 22.   Bowel Regimen? [] Yes   [x] No    Weights:   Daily Weight in k.5 (-), Weight in k.2 (-18), Weight in k.3 (-17), Weight in k.1 (-16), Weight in k.2 (-15)  Weights have been stable for 5 days.    Nutritionally Pertinent MEDICATIONS  (STANDING):  acyclovir   Oral Tab/Cap  atovaquone  Suspension  famotidine    Tablet  levoFLOXacin  Tablet  methylPREDNISolone sodium succinate IVPB  posaconazole DR Tablet  sodium chloride 0.9%.    Pertinent Labs:  @ 06:38: Na 139, BUN 20, Cr 0.93, <H>, K+ 3.9, Phos 5.2<H>, Mg 1.6, Alk Phos 69, ALT/SGPT 65<H>, AST/SGOT 21, HbA1c --    Skin per nursing documentation:   Edema: No edema noted per chart.    No pressure ulcers noted per chart    Estimated Needs:   [x] no change since previous assessment  [] recalculated:     Previous Nutrition Diagnosis: Mild malnutrition  Nutrition Diagnosis is: [x] ongoing  [] resolved [] not applicable     New Nutrition Diagnosis: [x] Not applicable    Nutrition Care Plan:  [] In Progress  [x] Achieved  [] Not applicable    Nutrition Interventions:     Education Provided: Discussed and provided High Calorie, High Protein Nutrition Therapy and Recipes.      [x] Yes:  [] No:        Recommendations:         [x] Continue current diet order as tolerated     [x] Continue oral nutrition supplement: Ensure Enlive 1/day (provides 350 kcal, 20 g protein)     [x] Add micronutrient supplementation: multivitamin      [x] Encourage adequate consumption of meals/supplement to optimize protein/energy intake     [x] Continue to monitor and replete electrolytes as needed.     Monitoring and Evaluation:   Continue to monitor nutritional intake, tolerance to diet prescription, weights, labs, skin integrity    RD remains available upon request and will follow up per protocol    Melvi Amato, Dietetic Intern Nutrition Follow Up Note  Patient seen for: Pt seen for mild malnutrition follow up.    Chart reviewed, events noted. "Patient is 37 yo male with Aplastic Anemia diagnosed  2020, s/p equine ATG, Cyclosporine, and Promacta. Bone marrow biopsy was done on 2021 revealed normal morphology and cellularity, eventually Promacta and Cyclosporine was stopped in 2021. Then noted his platelets dropping in 2021.   Now admitted with neutropenic fever found to have pancytopenia secondary to disease condition.  Bone marrow biopsy was repeated on 2021 showed decreased megakaryocytes c/w relapsed disease. Rabbit ATG/ prednisone  started on 21, ATG held starting on  due to reaction and resumed on 1/3/22. Hospital course complicated by ATG reaction, and neutropenic fever. Pt has pancytopenia secondary to chemotherapy and or disease condition." Likely discharge today.     Source: [x] Patient       [x] EMR        [] RN        [] Family at bedside       [] Other:    Diet Order:   Diet, Regular:   Supplement Feeding Modality:  Oral  Ensure Enlive Cans or Servings Per Day:  1       Frequency:  Daily (22)    - Is current order appropriate/adequate? [X] Yes  []  No:     - PO intake :   [x] >75%  Adequate    [] 50-75%  Fair       [] <50%  Poor    - Nutrition-related concerns:   -Pt improved appetite/PO intake, consuming 100% of meals/supplements ( Ensure Enlive 1x/day) during length of stay.   - Reports no chewing/swallowing difficulty. Denies any nausea, vomiting, diarrhea, or constipation.     GI:  Last BM 22.   Bowel Regimen? [] Yes   [x] No    Weights:   Daily Weight in k.5 (), Weight in k.2 (), Weight in k.3 (), Weight in k.1 (), Weight in k.2 (01-15)  * Pt relatively weight stable at this time.    Nutritionally Pertinent MEDICATIONS  (STANDING):  acyclovir   Oral Tab/Cap  atovaquone  Suspension  famotidine    Tablet  levoFLOXacin  Tablet  methylPREDNISolone sodium succinate IVPB  posaconazole DR Tablet  sodium chloride 0.9%.    Pertinent Labs:  @ 06:38: Na 139, BUN 20, Cr 0.93, <H>, K+ 3.9, Phos 5.2<H>, Mg 1.6, Alk Phos 69, ALT/SGPT 65<H>, AST/SGOT 21, HbA1c --    Skin per nursing documentation: No pressure ulcers noted per chart  Edema: No edema noted per chart.    Estimated Needs:   [x] no change since previous assessment  [] recalculated:     Previous Nutrition Diagnosis: Mild malnutrition  Nutrition Diagnosis is: [x] ongoing  - being addressed with PO intake, oral nutritional supplements    New Nutrition Diagnosis: [x] Not applicable    Nutrition Care Plan:  [] In Progress  [x] Achieved  [] Not applicable    Nutrition Interventions:     Education Provided: Discussed and provided High Calorie, High Protein Nutrition Therapy and Recipes (literature provided)      [x] Yes:  [] No:        Recommendations:         [x] Continue current diet order as tolerated     [x] Continue oral nutrition supplement: Ensure Enlive 1/day (provides 350 kcal, 20 g protein)     [x] Add micronutrient supplementation: multivitamin (unless contraindicated)     [x] Encourage adequate consumption of meals/supplement to optimize protein/energy intake     [x] Continue to monitor and replete electrolytes as needed.      [x] Diet education reviewed, reinforce as needed.        Monitoring and Evaluation:   Continue to monitor nutritional intake, tolerance to diet prescription, weights, labs, skin integrity    RD remains available upon request and will follow up per protocol  Melvi Amato, Dietetic Intern

## 2022-01-20 NOTE — PROGRESS NOTE ADULT - ATTENDING COMMENTS
36M with Aplastic Anemia diagnosed April 2020.  Underwent treatment with equine ATG, Cyclosporine, and Promacta.  Went into remission (BM bx 5/2021 with normal morphology and cellularity).  Promacta stopped May 2021.  Cyclosporin stopped August 2021.  By September, platelets started to drop.  Now with pancytopenia and neutropenic fever.  No obvious focus of infection. On cefepime, diflucan, acyclovir and mepron.  On cyclosporine 300 mg BID (started on 12/26/21), first level on 12/29/21 --197 increased dose to 325mg bid, repeat level  133, increasing CsA to 350 q12hr.   Today is cyclosporine day 24, day 18 rATG    - Completed rATG and continues with prednisone and Promacta.  - Feels well today, requiring transfusions less frequently  - CT sinuses with pansinutitis, appreciate ENT recs, on unasyn through Wednesday 1/19; significantly improved symptoms  - Csa level still low (161) - continue dose 475q12 starting tonight and repeat level on Thurs  - Transaminitis improved, switched to caspofungin 1/13/22. Will change to posaconazole for dispo planning  - consider d/c planning for Thursday 36M with Aplastic Anemia diagnosed April 2020.  Underwent treatment with equine ATG, Cyclosporine, and Promacta.  Went into remission (BM bx 5/2021 with normal morphology and cellularity).  Promacta stopped May 2021.  Cyclosporin stopped August 2021.  By September, platelets started to drop.  Now with pancytopenia and neutropenic fever.  No obvious focus of infection. On cefepime, diflucan, acyclovir and mepron.  On cyclosporine 300 mg BID (started on 12/26/21), first level on 12/29/21 --197 increased dose to 325mg bid, repeat level  133, increasing CsA to 350 q12hr. Now on 475q12.   Today is cyclosporine day 25, day 19 rATG    - Completed rATG and continues with prednisone and Promacta.  - Feels well today, requiring transfusions less frequently  - CT sinuses with pansinutitis, appreciate ENT recs, on unasyn through Wednesday 1/19; significantly improved symptoms  - Csa 475q12 w/ repeat level on Thurs -386. Will decrease back down to 450q12  - Transaminitis improved, switched to caspofungin 1/13/22. Will change to posaconazole for dispo planning. Has mild bilirubinemia now, will change to fluconazole as pt was previously on that as outpatient  - d/c planning for today.

## 2022-01-20 NOTE — PROGRESS NOTE ADULT - PROBLEM SELECTOR PLAN 1
Relapsed Aplastic Anemia   s/p hATG, CSA, and promacta with complete remission in 2020, BM bx on 11/9/20 revealed hypocellular marrow.  BMbx 12/2021 showing erythroid predominance, myeloid and erythroid maturation, and markedly decreased megakaryocytes c/w relapse  12/23-   transferred  to [facility] for rATG treatment  Monitor CBC with diff/lytes, transfuse and or replete PRN, Monitor weight, I and O, mouth care.  12/26- Started Cyclosporine.  12/27 test dose - no reaction noted  12/29 Started  Rabbit ATG/prednisone-Reaction to rATG- HELD on 12/30 due to reaction ; Continue promacta  1/3 Resumed  rabbit ATG/prednisone, 1/6 completed rATG.   1/10- Prednisone taper 1/12-1/26  Elevated CSA level on 1/16 was likely lab error-falsely elevated 404 . Dose increased to 475mg 1/18 because level was low 2 days in a row (163/161) now due for repeat level on Thurs 1/20. Relapsed Aplastic Anemia   s/p hATG, CSA, and promacta with complete remission in 2020, BM bx on 11/9/20 revealed hypocellular marrow.  BMbx 12/2021 showing erythroid predominance, myeloid and erythroid maturation, and markedly decreased megakaryocytes c/w relapse  12/23-   transferred  to [facility] for rATG treatment  Monitor CBC with diff/lytes, transfuse and or replete PRN, Monitor weight, I and O, mouth care.  12/26- Started Cyclosporine.  12/27 test dose - no reaction noted  12/29 Started  Rabbit ATG/prednisone-Reaction to rATG- HELD on 12/30 due to reaction ; Continue promacta  1/3 Resumed  rabbit ATG/prednisone, 1/6 completed rATG.   1/10- Prednisone taper 1/12-1/26  Elevated CSA level on 1/16 was likely lab error-falsely elevated 404 . Dose increased to 475mg 1/18 because level was low 2 days in a row (163/161) now due for repeat level on Thurs 1/20.  1/20: repeat level CSA level 381 will reduce dose Cyclosporine to 450 q12. Discharge home today. Will repeat labs as outpatient on 1/24

## 2022-01-20 NOTE — DISCHARGE NOTE NURSING/CASE MANAGEMENT/SOCIAL WORK - PATIENT PORTAL LINK FT
You can access the FollowMyHealth Patient Portal offered by [facility] by registering at the following website: [email or web address]. By joining [facility]’s FollowMyHealth portal, you will also be able to view your health information using other applications (apps) compatible with our system.

## 2022-01-20 NOTE — PROGRESS NOTE ADULT - PROBLEM SELECTOR PROBLEM 1
Aplastic anemia
Aplastic anemia
Sinus pain
Aplastic anemia
Aplastic anemia
Neutropenic fever
Aplastic anemia
Neutropenic fever

## 2022-01-20 NOTE — PROGRESS NOTE ADULT - PROBLEM SELECTOR PROBLEM 2
Infection
Toothache
Infection
Toothache
Infection

## 2022-01-20 NOTE — PROGRESS NOTE ADULT - SUBJECTIVE AND OBJECTIVE BOX
Diagnosis: Relapsed aplastic anemia    Protocol/Chemo Regimen:  Rabbit ATG/Cyclosporine/prednisone/Promacta     Day:  Rabbit ATG day 19 (D2 suspended due to reaction; resumed on 1/3)     Pt endorsed: No complaints.     Review of Systems: Denies any chest pain, palpitation, SOB, abdominal pain or dysuria.    Pain scale: Denies                                         Diet: regular Enlive QD    Allergies: No Known Allergies    ANTIMICROBIALS  acyclovir   Oral Tab/Cap 400 milliGRAM(s) Oral every 8 hours  atovaquone  Suspension 1500 milliGRAM(s) Oral daily  levoFLOXacin  Tablet 500 milliGRAM(s) Oral every 24 hours  posaconazole DR Tablet 300 milliGRAM(s) Oral daily      HEME/ONC MEDICATIONS  eltrombopag 150 milliGRAM(s) Oral daily      STANDING MEDICATIONS  Biotene Dry Mouth Oral Rinse 15 milliLiter(s) Swish and Spit four times a day  chlorhexidine 2% Cloths 1 Application(s) Topical <User Schedule>  cycloSPORINE  (SandIMMUNE) 475 milliGRAM(s) Oral every 12 hours  famotidine    Tablet 20 milliGRAM(s) Oral daily  fluticasone propionate 50 MICROgram(s)/spray Nasal Spray 1 Spray(s) Both Nostrils two times a day  loratadine 10 milliGRAM(s) Oral daily  methylPREDNISolone sodium succinate IVPB 500 milliGRAM(s) IV Intermittent once  sodium chloride 0.65% Nasal 1 Spray(s) Both Nostrils four times a day  sodium chloride 0.9%. 1000 milliLiter(s) IV Continuous <Continuous>      PRN MEDICATIONS  acetaminophen     Tablet .. 650 milliGRAM(s) Oral every 6 hours PRN  acetaminophen   IVPB .. 1000 milliGRAM(s) IV Intermittent once PRN  aluminum hydroxide/magnesium hydroxide/simethicone Suspension 30 milliLiter(s) Oral every 4 hours PRN  calcium carbonate    500 mG (Tums) Chewable 1 Tablet(s) Chew every 4 hours PRN  diphenhydrAMINE 25 milliGRAM(s) Oral every 12 hours PRN  diphenhydrAMINE Injectable 25 milliGRAM(s) IV Push once PRN  EPINEPHrine     1 mG/mL Injectable 0.3 milliGRAM(s) IntraMuscular once PRN  hydrocortisone sodium succinate Injectable 50 milliGRAM(s) IV Push every 12 hours PRN  metoclopramide Injectable 10 milliGRAM(s) IV Push every 6 hours PRN  ondansetron Injectable 8 milliGRAM(s) IV Push every 8 hours PRN  oxyCODONE    IR 5 milliGRAM(s) Oral every 6 hours PRN      Vital Signs Last 24 Hrs  T(C): 36.7 (20 Jan 2022 05:45), Max: 36.9 (19 Jan 2022 21:00)  T(F): 98.1 (20 Jan 2022 05:45), Max: 98.5 (19 Jan 2022 21:00)  HR: 91 (20 Jan 2022 05:45) (79 - 95)  BP: 138/86 (20 Jan 2022 05:45) (130/77 - 147/88)  RR: 18 (20 Jan 2022 05:45) (18 - 18)  SpO2: 95% (20 Jan 2022 05:45) (95% - 96%)    PHYSICAL EXAM  General: adult in NAD  HEENT: clear oropharynx, anicteric sclera, pink conjunctiva  Neck: supple  CV: normal S1/S2 RRR  Lungs: positive air movement b/l ant lungs,clear to auscultation, no wheezes, no rales  Abdomen: soft non-tender non-distended, no hepatosplenomegaly  Ext: no clubbing cyanosis or edema  Skin: no rashes and no petechiae  Neuro: alert and oriented X 4, no focal deficits  Central Line: normal    LABS:    Blood Cultures:                           8.1    0.57  )-----------( 14       ( 20 Jan 2022 06:38 )             22.8         Mean Cell Volume : 82.9 fl  Mean Cell Hemoglobin : 29.5 pg  Mean Cell Hemoglobin Concentration : 35.5 gm/dL  Auto Neutrophil # : 0.29 K/uL  Auto Lymphocyte # : 0.18 K/uL  Auto Monocyte # : 0.09 K/uL  Auto Eosinophil # : 0.01 K/uL  Auto Basophil # : 0.00 K/uL  Auto Neutrophil % : 51.3 %  Auto Lymphocyte % : 31.0 %  Auto Monocyte % : 15.0 %  Auto Eosinophil % : 0.9 %  Auto Basophil % : 0.0 %      01-20    139  |  104  |  20  ----------------------------<  108<H>  3.9   |  23  |  0.93    Ca    9.0      20 Jan 2022 06:38  Phos  5.2     01-20  Mg     1.6     01-20    TPro  6.3  /  Alb  3.7  /  TBili  2.4<H>  /  DBili  x   /  AST  21  /  ALT  65<H>  /  AlkPhos  69  01-20      Mg 1.6  Phos 5.2      PT/INR - ( 20 Jan 2022 06:38 )   PT: 12.4 sec;   INR: 1.04 ratio         PTT - ( 20 Jan 2022 06:38 )  PTT:22.3 sec      Uric Acid 2.2        RADIOLOGY & ADDITIONAL STUDIES:         Diagnosis: Relapsed aplastic anemia    Protocol/Chemo Regimen:  Rabbit ATG/Cyclosporine/prednisone/Promacta     Day:  Rabbit ATG day 19 (D2 suspended due to reaction; resumed on 1/3)     Pt endorsed: No complaints.     Review of Systems: Denies any chest pain, palpitation, SOB, abdominal pain or dysuria.    Pain scale: Denies                                         Diet: regular Enlive QD    Allergies: No Known Allergies    ANTIMICROBIALS  acyclovir   Oral Tab/Cap 400 milliGRAM(s) Oral every 8 hours  atovaquone  Suspension 1500 milliGRAM(s) Oral daily  levoFLOXacin  Tablet 500 milliGRAM(s) Oral every 24 hours  posaconazole DR Tablet 300 milliGRAM(s) Oral daily      HEME/ONC MEDICATIONS  eltrombopag 150 milliGRAM(s) Oral daily      STANDING MEDICATIONS  Biotene Dry Mouth Oral Rinse 15 milliLiter(s) Swish and Spit four times a day  chlorhexidine 2% Cloths 1 Application(s) Topical <User Schedule>  cycloSPORINE  (SandIMMUNE) 475 milliGRAM(s) Oral every 12 hours  famotidine    Tablet 20 milliGRAM(s) Oral daily  fluticasone propionate 50 MICROgram(s)/spray Nasal Spray 1 Spray(s) Both Nostrils two times a day  loratadine 10 milliGRAM(s) Oral daily  methylPREDNISolone sodium succinate IVPB 500 milliGRAM(s) IV Intermittent once  sodium chloride 0.65% Nasal 1 Spray(s) Both Nostrils four times a day  sodium chloride 0.9%. 1000 milliLiter(s) IV Continuous <Continuous>      PRN MEDICATIONS  acetaminophen     Tablet .. 650 milliGRAM(s) Oral every 6 hours PRN  acetaminophen   IVPB .. 1000 milliGRAM(s) IV Intermittent once PRN  aluminum hydroxide/magnesium hydroxide/simethicone Suspension 30 milliLiter(s) Oral every 4 hours PRN  calcium carbonate    500 mG (Tums) Chewable 1 Tablet(s) Chew every 4 hours PRN  diphenhydrAMINE 25 milliGRAM(s) Oral every 12 hours PRN  diphenhydrAMINE Injectable 25 milliGRAM(s) IV Push once PRN  EPINEPHrine     1 mG/mL Injectable 0.3 milliGRAM(s) IntraMuscular once PRN  hydrocortisone sodium succinate Injectable 50 milliGRAM(s) IV Push every 12 hours PRN  metoclopramide Injectable 10 milliGRAM(s) IV Push every 6 hours PRN  ondansetron Injectable 8 milliGRAM(s) IV Push every 8 hours PRN  oxyCODONE    IR 5 milliGRAM(s) Oral every 6 hours PRN      Vital Signs Last 24 Hrs  T(C): 36.7 (20 Jan 2022 05:45), Max: 36.9 (19 Jan 2022 21:00)  T(F): 98.1 (20 Jan 2022 05:45), Max: 98.5 (19 Jan 2022 21:00)  HR: 91 (20 Jan 2022 05:45) (79 - 95)  BP: 138/86 (20 Jan 2022 05:45) (130/77 - 147/88)  RR: 18 (20 Jan 2022 05:45) (18 - 18)  SpO2: 95% (20 Jan 2022 05:45) (95% - 96%)    PHYSICAL EXAM  General: adult in NAD  HEENT: clear oropharynx, sinus tenderness resolved.  CV: normal S1/S2 RRR  Lungs: clear to auscultation, no wheezes, no rales  Abdomen: soft non-tender non-distended  Ext: no edema in BLE  Skin: no rash  Neuro: alert and oriented X 4  Central Line: RUE PICC  c/d/i    LABS:                        8.1    0.57  )-----------( 14       ( 20 Jan 2022 06:38 )             22.8         Mean Cell Volume : 82.9 fl  Mean Cell Hemoglobin : 29.5 pg  Mean Cell Hemoglobin Concentration : 35.5 gm/dL  Auto Neutrophil # : 0.29 K/uL  Auto Lymphocyte # : 0.18 K/uL  Auto Monocyte # : 0.09 K/uL  Auto Eosinophil # : 0.01 K/uL  Auto Basophil # : 0.00 K/uL  Auto Neutrophil % : 51.3 %  Auto Lymphocyte % : 31.0 %  Auto Monocyte % : 15.0 %  Auto Eosinophil % : 0.9 %  Auto Basophil % : 0.0 %      01-20    139  |  104  |  20  ----------------------------<  108<H>  3.9   |  23  |  0.93    Ca    9.0      20 Jan 2022 06:38  Phos  5.2     01-20  Mg     1.6     01-20    TPro  6.3  /  Alb  3.7  /  TBili  2.4<H>  /  DBili  x   /  AST  21  /  ALT  65<H>  /  AlkPhos  69  01-20      Mg 1.6  Phos 5.2      PT/INR - ( 20 Jan 2022 06:38 )   PT: 12.4 sec;   INR: 1.04 ratio         PTT - ( 20 Jan 2022 06:38 )  PTT:22.3 sec      Uric Acid 2.2        RADIOLOGY & ADDITIONAL STUDIES:

## 2022-01-20 NOTE — PROVIDER CONTACT NOTE (CRITICAL VALUE NOTIFICATION) - NAME OF MD/NP/PA/DO NOTIFIED:
Ioana, NP
Delphine
Ioana, NP
REANNA Merritt
Viola BEARD
michelle BEARD
REANNA CRUZ
Ian
Mita Akers
Earnest BEARD
NP
Buck Sosa NP
REANNA Merritt
Ioana, NP
Marcellus BEARD
sherrie BEARD
REANNA Mukherjee
phoenix fair np

## 2022-01-20 NOTE — PROVIDER CONTACT NOTE (CRITICAL VALUE NOTIFICATION) - TEST AND RESULT REPORTED:
hgb 6.4, hct 18.1
hgb 7.5 hct 20.8 plt ct 6
hct 20.9 ,plat 15
Hct 20.1 plqatelet 13
PLT-10,Hct-20.5
hct=20.5
H/H/plat=7/19.1/2
HCT 20.3, PLAT 20
wbc o.53, Hct 20.5 Platelet 15
Hgb 6.8, Hct 18.6
Plt 17
H/H=6.2/16.8 plt=19
PLT=18,Hgb-6.8,Hct-18.4
hct 20
platelets 14
PLT 8
H/H-6.9/18.8 Plt=7

## 2022-01-20 NOTE — CHART NOTE - NSCHARTNOTESELECT_GEN_ALL_CORE
Chest discomfort/Event Note
Event Note
Neutropenic Fever/Event Note
ATG test dose/Event Note
Event Note
Hematology
Nutrition Services

## 2022-01-20 NOTE — DISCHARGE NOTE NURSING/CASE MANAGEMENT/SOCIAL WORK - NSDCPEFALRISK_GEN_ALL_CORE
For information on Fall & Injury Prevention, visit: [email or web address] OR  [email or web address] OR  https://www.cdc.gov/steadi/patient.html

## 2022-01-20 NOTE — PROGRESS NOTE ADULT - PROBLEM SELECTOR PLAN 2
Neutropenic, afebrile   Will start levaquin for ppx on 1/20 as patient finishing Unasyn for sinusitis 1/19.  Continue acyclovir. Change caspo to posa 1/18 12/24- Staph aureus PCR detected, will start Vanco 1 gm Q 12 hrs stopped 12/26. CT maxillo facial did not reveal any abscess.   ID following  Pan cx CXR if fever

## 2022-01-20 NOTE — PROGRESS NOTE ADULT - ATTENDING SUPERVISION STATEMENT
ACP
Resident
ACP
Resident
ACP
ACP

## 2022-01-21 ENCOUNTER — OUTPATIENT (OUTPATIENT)
Dept: OUTPATIENT SERVICES | Facility: HOSPITAL | Age: 37
LOS: 1 days | Discharge: HOME | End: 2022-01-21

## 2022-01-21 DIAGNOSIS — D64.9 ANEMIA, UNSPECIFIED: ICD-10-CM

## 2022-01-21 DIAGNOSIS — Z98.890 OTHER SPECIFIED POSTPROCEDURAL STATES: Chronic | ICD-10-CM

## 2022-01-24 ENCOUNTER — APPOINTMENT (OUTPATIENT)
Dept: INFUSION THERAPY | Facility: HOSPITAL | Age: 37
End: 2022-01-24

## 2022-01-24 ENCOUNTER — LABORATORY RESULT (OUTPATIENT)
Age: 37
End: 2022-01-24

## 2022-01-24 ENCOUNTER — RESULT REVIEW (OUTPATIENT)
Age: 37
End: 2022-01-24

## 2022-01-24 ENCOUNTER — NON-APPOINTMENT (OUTPATIENT)
Age: 37
End: 2022-01-24

## 2022-01-24 LAB
HCT VFR BLD CALC: 25.7 % — LOW (ref 39–50)
HGB BLD-MCNC: 9.2 G/DL — LOW (ref 13–17)
MCHC RBC-ENTMCNC: 30.4 PG — SIGNIFICANT CHANGE UP (ref 27–34)
MCHC RBC-ENTMCNC: 35.8 G/DL — SIGNIFICANT CHANGE UP (ref 32–36)
MCV RBC AUTO: 84.8 FL — SIGNIFICANT CHANGE UP (ref 80–100)
NRBC # BLD: 0 /100 WBCS — SIGNIFICANT CHANGE UP (ref 0–0)
PLATELET # BLD AUTO: 8 K/UL — CRITICAL LOW (ref 150–400)
RBC # BLD: 3.03 M/UL — LOW (ref 4.2–5.8)
RBC # FLD: 13.7 % — SIGNIFICANT CHANGE UP (ref 10.3–14.5)
WBC # BLD: 0.39 K/UL — CRITICAL LOW (ref 3.8–10.5)
WBC # FLD AUTO: 0.39 K/UL — CRITICAL LOW (ref 3.8–10.5)

## 2022-01-25 DIAGNOSIS — R11.2 NAUSEA WITH VOMITING, UNSPECIFIED: ICD-10-CM

## 2022-01-25 DIAGNOSIS — Z51.89 ENCOUNTER FOR OTHER SPECIFIED AFTERCARE: ICD-10-CM

## 2022-01-25 DIAGNOSIS — Z51.11 ENCOUNTER FOR ANTINEOPLASTIC CHEMOTHERAPY: ICD-10-CM

## 2022-01-27 ENCOUNTER — APPOINTMENT (OUTPATIENT)
Dept: HEMATOLOGY ONCOLOGY | Facility: CLINIC | Age: 37
End: 2022-01-27
Payer: COMMERCIAL

## 2022-01-27 ENCOUNTER — APPOINTMENT (OUTPATIENT)
Dept: INFUSION THERAPY | Facility: HOSPITAL | Age: 37
End: 2022-01-27

## 2022-01-27 ENCOUNTER — RESULT REVIEW (OUTPATIENT)
Age: 37
End: 2022-01-27

## 2022-01-27 VITALS
WEIGHT: 229.72 LBS | DIASTOLIC BLOOD PRESSURE: 102 MMHG | RESPIRATION RATE: 17 BRPM | HEART RATE: 111 BPM | TEMPERATURE: 97.4 F | HEIGHT: 68.19 IN | BODY MASS INDEX: 34.82 KG/M2 | SYSTOLIC BLOOD PRESSURE: 152 MMHG | OXYGEN SATURATION: 98 %

## 2022-01-27 LAB
HCT VFR BLD CALC: 22.6 % — LOW (ref 39–50)
HGB BLD-MCNC: 8.2 G/DL — LOW (ref 13–17)
MCHC RBC-ENTMCNC: 31.1 PG — SIGNIFICANT CHANGE UP (ref 27–34)
MCHC RBC-ENTMCNC: 36.3 G/DL — HIGH (ref 32–36)
MCV RBC AUTO: 85.6 FL — SIGNIFICANT CHANGE UP (ref 80–100)
NRBC # BLD: 0 /100 WBCS — SIGNIFICANT CHANGE UP (ref 0–0)
PLATELET # BLD AUTO: 5 K/UL — CRITICAL LOW (ref 150–400)
RBC # BLD: 2.64 M/UL — LOW (ref 4.2–5.8)
RBC # FLD: 14.1 % — SIGNIFICANT CHANGE UP (ref 10.3–14.5)
WBC # BLD: 0.33 K/UL — CRITICAL LOW (ref 3.8–10.5)
WBC # FLD AUTO: 0.33 K/UL — CRITICAL LOW (ref 3.8–10.5)

## 2022-01-27 PROCEDURE — 99215 OFFICE O/P EST HI 40 MIN: CPT

## 2022-01-28 ENCOUNTER — NON-APPOINTMENT (OUTPATIENT)
Age: 37
End: 2022-01-28

## 2022-01-28 LAB
ALBUMIN SERPL ELPH-MCNC: 4.1 G/DL
ALP BLD-CCNC: 128 U/L
ALT SERPL-CCNC: 145 U/L
ANION GAP SERPL CALC-SCNC: 15 MMOL/L
AST SERPL-CCNC: 33 U/L
BILIRUB SERPL-MCNC: 1.6 MG/DL
BUN SERPL-MCNC: 32 MG/DL
CALCIUM SERPL-MCNC: 9.5 MG/DL
CHLORIDE SERPL-SCNC: 101 MMOL/L
CO2 SERPL-SCNC: 22 MMOL/L
CREAT SERPL-MCNC: 1.37 MG/DL
CYCLOSPORINE SER-MCNC: 434 NG/ML
GLUCOSE SERPL-MCNC: 135 MG/DL
LDH SERPL-CCNC: 321 U/L
POTASSIUM SERPL-SCNC: 4.2 MMOL/L
PROT SERPL-MCNC: 7.3 G/DL
SODIUM SERPL-SCNC: 138 MMOL/L

## 2022-01-28 NOTE — HISTORY OF PRESENT ILLNESS
[Disease:__________________________] : Disease: [unfilled] [Therapy: ___] : Therapy: [unfilled] [de-identified] : 33 y/o M with no PMH presented to OSH originally for c/o dizziness and feeling unwell and was then transferred to [facility] for further hematologic evaluation as patient was found to be severely pancytopenic. Patient first started noticing scattered bruising throughout his body (mostly thighs and arms) about 6 weeks prior to presentation, and also had epistaxis. Pt had also noticed intermittent fevers and chills, responsive to tylenol, and noticed very dark stools for the same time period. Also with spontaneous gum bleeding and endorsed severe fatigue and SOB with exertion. Also noted 15 lbs weight loss, partially intentional over past 2-3 months prior to presentation.  Also c/o mild headache located in posterior aspect of his head, denies sore throat, cough, +nausea but no vomiting, no abdominal pain, no diarrhea, no BRBPR, no urinary symptoms, no LE swelling, +visual changes.     \par \par Patient was transferred to [facility] from [facility] on 4/18. Patient was started on IVF, Levaquin, and allopurinol. Patient transfused blood and platelets. Bone Marrow biopsy performed on 4/20 consistent with Aplastic Anemia. 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 Because of this, patient's platelet transfusion threshold was made 50k. Horse ATG test dose was applied 4/22 with no reaction. Treatment started 4/23 consisting of equine ATG at 40mg/kg/day x 4 days, Cyclosporine 10mg/kg/day divided doses BID, Eltrombopag 150mg daily. Patient had episode of hives with ATG infusion on Day 1 thus steroid regimen was changed from oral prednisone to IV Solumedrol Q8 for the duration of ATG treatment and transitioned back to oral prednisone 1mg/kg daily for days 5-10 then tapered. The patient had grade 3 transaminitis likely from Promacta and ATG and Promacta was held as of 5/1. While patient was in the hospital, his course was also complicated by epigastric pain consistent with dyspepsia, most likely due to cyclosporine. Was managed on pepcid BID and sucralfate q6 hours and mylanta as needed with good relief. \par \par On 5/12 the patient's transaminitis had improved(grade 2 total bili, grade 1 AST, grade 2 ALT) and Promacta 150 mg po QD was resumed. Pt's cyclosporine dose was adjusted  according to the level (goal 200-400) and he was on 400 mg po q12h as of 5/18. On 5/16, patient developed chest discomfort during platelet transfusion which improved with benadryl. EKG and CXR were wnl. Patient originally refused benadryl pre-medication prior to platelets that day due to side effects with taking Benadryl, and it was decided to premedicate pt with Claritin or Zyrtec pre blood transfusion instead. On 5/20, cyclosporine level was 460, therefore dose was lowered to 300 mg PO BID. Patient's vision had improved, so platelet transfusion threshold liberalized to 20k. \par \par Patient has since had a complete recovery of blood counts. He discontinued Promacta after 6 months of therapy. He has been monitored since then wit stable CBC and therapeutic cyclosporine levels. \par \par He has since had repeat bone marrow biopsy on 5/18/2021 with normal morphology and cellularity\par \par In September 2021 noted his platelets dropping, and ultimately slowly was becoming pancytopenia. He was restarted on cyclosporine at home, but ultimately was admitted in late December with neutropenic fever. Bone marrow biopsy was repeated on 12/2021 and showed decreased megakaryocytes and other findings consistent with relapse aplastic anemia. Patient was transferred to [address] [place] [address] on 12/23 to start treatment for relapse with rabbit ATG, cyclosporine, prednisone, and eltrombopag. On 12/27 rabbit ATG test dose was given with no adverse reaction. Cyclosporine was started evening of 12/26. Rabbit ATG started 12/29, patient with reaction upon increase in flow rate with fevers, rigors. Patient re-challenged 12/30, again had reaction after completion of Day 1 dose. ATG was subsequently held, but then restarted on 1/3 and completed remaining 5 day course on 1/6. Cyclosporine levels were checked biweekly, dose adjusted as needed based on level. Ultimately was discharged on 450 mg BID cyclosporine. Patient was seen by ENT for sinusitis and treated with Unasyn thru 1/19. Patient remained neutropenic and started ppx w/ levaquin on 1/20. [de-identified] : - Markedly hypocellular bone marrow (mostly 5% and focal 10-\par 15%) with focal minimal erythropoiesis, minimal to absent\par myelopoiesis, absent megakaryocytes, increased iron stores. Aplastic bone marrow.  [de-identified] : Cytogenetics: \par \par Result: Male karyotype\par Karyotype: 46,XY               {12               } [de-identified] : very severe Aplastic Anemia [FreeTextEntry1] : Therapy initiated 4/23/2020 [de-identified] : On 1/27/2022 patient reported he felt very tired and achy. His knees are very painful and he has walked a lot. \par He woke up with a fever on 1/26 in the morning of around 100 degrees, his girlfriend thought he felt a little warm. He took a tylenol and it improved. It hasn't recurred since then. \par His appetite was up and down. He complained of a lot of nausea, he picked up prescription to help. \par Increased Promacta from 150 mg to 300 mg daily when discharged from the hospital.

## 2022-01-28 NOTE — PHYSICAL EXAM
[Fully active, able to carry on all pre-disease performance without restriction] : Status 0 - Fully active, able to carry on all pre-disease performance without restriction [Normal] : affect appropriate [de-identified] : jaundiced appearing.  [de-identified] : EOMI, scleral icterus [de-identified] : supple [de-identified] : (+) S1S2 RRR [de-identified] : no edema (+)pp [de-identified] : no tenderness [de-identified] : ROM intact [de-identified] : no obvious petechiae - some mild on anterior lower shin [de-identified] : A&Ox3

## 2022-01-28 NOTE — ASSESSMENT
[FreeTextEntry1] : 34 y/o M presented April 2020 with aplastic anemia (very severe AA) confirmed on bone marrow biopsy at [facility] and now s/p inpatient treatment beginning 4/23/2020 with horse ATG + CsA + promacta with complete remission. Bone marrow bx on 11/9/20 revealed hypocellular marrow, overall cellularity improved (20-50%) showing marrow regeneration. BMBx on 5/18/2021 showing normal morphology with normal cellularity. Patient with worsening cytopenias in November 2021 and with neutropenic fever as well, sent inpatient, s/p treatment with rabbit ATG, prednisone, cyclosporine and Promacta, here for outpatient follow up. \par \par -Patient remains pancytopenia. Severe neutropenia. \par -Jaundiced and labs from 1/24 showing increase in bilirubin to 2.5 and elevated ALT to 297. Likely Promacta related. Patient discharged on 300 mg daily Promacta, had been taking 150 mg inpatient. Will decrease back to 150 mg daily and repeat CMP today. Patient advised that if any worsening feeling of malaise, nausea /vomiting, or abdominal pain he should report directly to [facility] ED. He understands and agrees. Also informed him if he or his partner notice him becoming more jaundiced to report to the [facility] ED. \par -Patient with plts 5 today, will need plt transfusion and possible platelets TIW. Will also plan for blood transfusion on next appointment has Hb downtrending now 8.2. \par -Severe neutropenia - continue with prophylaxis with levaquin, diflucan, and acyclovir.\par -Patient with low grade temperature yesterday, took tylenol. Informed patient if he has any fevers he needs to immediately present to [facility] ED. He understands and agrees. \par -Continuing cyclosporine, on 450 mg BID but level slightly high the other day. Wasn't checked very appropriately then, rechecked this morning at true trough level. Will likely decrease to 400 mg BID but will await today's results. \par -Monitoring Cr closely as well to monitor for toxicity due to cyclosporine. \par -Once again discussed with patient the indication for allogeneic transplant. He prefers to wait and see if the immunosuppression works again, but is ready to pursue transplant if this fails. \par -Patient understands and agrees with plan. All information explained to the best of my ability.\par -RTC in 2 weeks.

## 2022-01-31 ENCOUNTER — RESULT REVIEW (OUTPATIENT)
Age: 37
End: 2022-01-31

## 2022-01-31 ENCOUNTER — APPOINTMENT (OUTPATIENT)
Dept: INFUSION THERAPY | Facility: HOSPITAL | Age: 37
End: 2022-01-31

## 2022-01-31 ENCOUNTER — NON-APPOINTMENT (OUTPATIENT)
Age: 37
End: 2022-01-31

## 2022-01-31 LAB
ANISOCYTOSIS BLD QL: SLIGHT — SIGNIFICANT CHANGE UP
BASOPHILS # BLD AUTO: 0 K/UL — SIGNIFICANT CHANGE UP (ref 0–0.2)
BASOPHILS NFR BLD AUTO: 0 % — SIGNIFICANT CHANGE UP (ref 0–2)
DACRYOCYTES BLD QL SMEAR: SLIGHT — SIGNIFICANT CHANGE UP
EOSINOPHIL # BLD AUTO: 0 K/UL — SIGNIFICANT CHANGE UP (ref 0–0.5)
EOSINOPHIL NFR BLD AUTO: 0 % — SIGNIFICANT CHANGE UP (ref 0–6)
HCT VFR BLD CALC: 18.9 % — CRITICAL LOW (ref 39–50)
HGB BLD-MCNC: 7 G/DL — CRITICAL LOW (ref 13–17)
LYMPHOCYTES # BLD AUTO: 0.4 K/UL — LOW (ref 1–3.3)
LYMPHOCYTES # BLD AUTO: 42 % — SIGNIFICANT CHANGE UP (ref 13–44)
MCHC RBC-ENTMCNC: 31.1 PG — SIGNIFICANT CHANGE UP (ref 27–34)
MCHC RBC-ENTMCNC: 37 G/DL — HIGH (ref 32–36)
MCV RBC AUTO: 84 FL — SIGNIFICANT CHANGE UP (ref 80–100)
MONOCYTES # BLD AUTO: 0.27 K/UL — SIGNIFICANT CHANGE UP (ref 0–0.9)
MONOCYTES NFR BLD AUTO: 28 % — HIGH (ref 2–14)
NEUTROPHILS # BLD AUTO: 0.29 K/UL — LOW (ref 1.8–7.4)
NEUTROPHILS NFR BLD AUTO: 30 % — LOW (ref 43–77)
NRBC # BLD: 0 /100 — SIGNIFICANT CHANGE UP (ref 0–0)
NRBC # BLD: SIGNIFICANT CHANGE UP /100 WBCS (ref 0–0)
PLAT MORPH BLD: NORMAL — SIGNIFICANT CHANGE UP
PLATELET # BLD AUTO: 3 K/UL — CRITICAL LOW (ref 150–400)
POIKILOCYTOSIS BLD QL AUTO: SLIGHT — SIGNIFICANT CHANGE UP
RBC # BLD: 2.25 M/UL — LOW (ref 4.2–5.8)
RBC # FLD: 14 % — SIGNIFICANT CHANGE UP (ref 10.3–14.5)
RBC BLD AUTO: ABNORMAL
WBC # BLD: 0.96 K/UL — CRITICAL LOW (ref 3.8–10.5)
WBC # FLD AUTO: 0.96 K/UL — CRITICAL LOW (ref 3.8–10.5)

## 2022-02-01 ENCOUNTER — APPOINTMENT (OUTPATIENT)
Dept: INFUSION THERAPY | Facility: HOSPITAL | Age: 37
End: 2022-02-01

## 2022-02-03 ENCOUNTER — RESULT REVIEW (OUTPATIENT)
Age: 37
End: 2022-02-03

## 2022-02-03 ENCOUNTER — APPOINTMENT (OUTPATIENT)
Dept: INFUSION THERAPY | Facility: HOSPITAL | Age: 37
End: 2022-02-03

## 2022-02-03 ENCOUNTER — APPOINTMENT (OUTPATIENT)
Dept: HEMATOLOGY ONCOLOGY | Facility: CLINIC | Age: 37
End: 2022-02-03
Payer: COMMERCIAL

## 2022-02-03 VITALS
RESPIRATION RATE: 16 BRPM | HEIGHT: 68.19 IN | HEART RATE: 78 BPM | TEMPERATURE: 97.5 F | WEIGHT: 230.6 LBS | DIASTOLIC BLOOD PRESSURE: 84 MMHG | OXYGEN SATURATION: 97 % | BODY MASS INDEX: 34.95 KG/M2 | SYSTOLIC BLOOD PRESSURE: 151 MMHG

## 2022-02-03 LAB
BASOPHILS # BLD AUTO: 0 K/UL — SIGNIFICANT CHANGE UP (ref 0–0.2)
BASOPHILS NFR BLD AUTO: 0 % — SIGNIFICANT CHANGE UP (ref 0–2)
EOSINOPHIL # BLD AUTO: 0 K/UL — SIGNIFICANT CHANGE UP (ref 0–0.5)
EOSINOPHIL NFR BLD AUTO: 0 % — SIGNIFICANT CHANGE UP (ref 0–6)
HCT VFR BLD CALC: 25.3 % — LOW (ref 39–50)
HGB BLD-MCNC: 8.8 G/DL — LOW (ref 13–17)
IMM GRANULOCYTES NFR BLD AUTO: 0.8 % — SIGNIFICANT CHANGE UP (ref 0–1.5)
LYMPHOCYTES # BLD AUTO: 0.77 K/UL — LOW (ref 1–3.3)
LYMPHOCYTES # BLD AUTO: 60.6 % — HIGH (ref 13–44)
MCHC RBC-ENTMCNC: 29.4 PG — SIGNIFICANT CHANGE UP (ref 27–34)
MCHC RBC-ENTMCNC: 34.5 G/DL — SIGNIFICANT CHANGE UP (ref 32–36)
MCV RBC AUTO: 85.1 FL — SIGNIFICANT CHANGE UP (ref 80–100)
MONOCYTES # BLD AUTO: 0.17 K/UL — SIGNIFICANT CHANGE UP (ref 0–0.9)
MONOCYTES NFR BLD AUTO: 13.4 % — SIGNIFICANT CHANGE UP (ref 2–14)
NEUTROPHILS # BLD AUTO: 0.32 K/UL — LOW (ref 1.8–7.4)
NEUTROPHILS NFR BLD AUTO: 25.2 % — LOW (ref 43–77)
NRBC # BLD: 0 /100 WBCS — SIGNIFICANT CHANGE UP (ref 0–0)
PLATELET # BLD AUTO: 14 K/UL — CRITICAL LOW (ref 150–400)
RBC # BLD: 2.96 M/UL — LOW (ref 4.2–5.8)
RBC # FLD: 14.1 % — SIGNIFICANT CHANGE UP (ref 10.3–14.5)
WBC # BLD: 1.27 K/UL — LOW (ref 3.8–10.5)
WBC # FLD AUTO: 1.27 K/UL — LOW (ref 3.8–10.5)

## 2022-02-03 PROCEDURE — 99214 OFFICE O/P EST MOD 30 MIN: CPT

## 2022-02-03 NOTE — PHYSICAL EXAM
[Fully active, able to carry on all pre-disease performance without restriction] : Status 0 - Fully active, able to carry on all pre-disease performance without restriction [Normal] : affect appropriate [Ulcers] : no ulcers [Mucositis] : no mucositis [Thrush] : no thrush [Vesicles] : no vesicles [de-identified] : jaundiced appearing [de-identified] : EOMI, scleral icterus [de-identified] : (+) area on left buccal mucosa with bruising where he bit his mouth yesterday no signs of infection [de-identified] : supple [de-identified] : (+) S1S2 RRR [de-identified] : no edema (+)pp [de-identified] : no tenderness [de-identified] : ROM intact [de-identified] : warm/dry [de-identified] : A&Ox3

## 2022-02-03 NOTE — HISTORY OF PRESENT ILLNESS
[Disease:__________________________] : Disease: [unfilled] [Therapy: ___] : Therapy: [unfilled] [de-identified] : 35 y/o M with no PMH presented to OSH originally for c/o dizziness and feeling unwell and was then transferred to [facility] for further hematologic evaluation as patient was found to be severely pancytopenic. Patient first started noticing scattered bruising throughout his body (mostly thighs and arms) about 6 weeks prior to presentation, and also had epistaxis. Pt had also noticed intermittent fevers and chills, responsive to tylenol, and noticed very dark stools for the same time period. Also with spontaneous gum bleeding and endorsed severe fatigue and SOB with exertion. Also noted 15 lbs weight loss, partially intentional over past 2-3 months prior to presentation.  Also c/o mild headache located in posterior aspect of his head, denies sore throat, cough, +nausea but no vomiting, no abdominal pain, no diarrhea, no BRBPR, no urinary symptoms, no LE swelling, +visual changes.     \par \par Patient was transferred to [facility] from [facility] on 4/18. Patient was started on IVF, Levaquin, and allopurinol. Patient transfused blood and platelets. Bone Marrow biopsy performed on 4/20 consistent with Aplastic Anemia. 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 Because of this, patient's platelet transfusion threshold was made 50k. Horse ATG test dose was applied 4/22 with no reaction. Treatment started 4/23 consisting of equine ATG at 40mg/kg/day x 4 days, Cyclosporine 10mg/kg/day divided doses BID, Eltrombopag 150mg daily. Patient had episode of hives with ATG infusion on Day 1 thus steroid regimen was changed from oral prednisone to IV Solumedrol Q8 for the duration of ATG treatment and transitioned back to oral prednisone 1mg/kg daily for days 5-10 then tapered. The patient had grade 3 transaminitis likely from Promacta and ATG and Promacta was held as of 5/1. While patient was in the hospital, his course was also complicated by epigastric pain consistent with dyspepsia, most likely due to cyclosporine. Was managed on pepcid BID and sucralfate q6 hours and mylanta as needed with good relief. \par \par On 5/12 the patient's transaminitis had improved(grade 2 total bili, grade 1 AST, grade 2 ALT) and Promacta 150 mg po QD was resumed. Pt's cyclosporine dose was adjusted  according to the level (goal 200-400) and he was on 400 mg po q12h as of 5/18. On 5/16, patient developed chest discomfort during platelet transfusion which improved with benadryl. EKG and CXR were wnl. Patient originally refused benadryl pre-medication prior to platelets that day due to side effects with taking Benadryl, and it was decided to premedicate pt with Claritin or Zyrtec pre blood transfusion instead. On 5/20, cyclosporine level was 460, therefore dose was lowered to 300 mg PO BID. Patient's vision had improved, so platelet transfusion threshold liberalized to 20k. \par \par Patient has since had a complete recovery of blood counts. He discontinued Promacta after 6 months of therapy. He has been monitored since then wit stable CBC and therapeutic cyclosporine levels. \par \par He has since had repeat bone marrow biopsy on 5/18/2021 with normal morphology and cellularity\par \par In September 2021 noted his platelets dropping, and ultimately slowly was becoming pancytopenia. He was restarted on cyclosporine at home, but ultimately was admitted in late December with neutropenic fever. Bone marrow biopsy was repeated on 12/2021 and showed decreased megakaryocytes and other findings consistent with relapse aplastic anemia. Patient was transferred to [address] [place] [address] on 12/23 to start treatment for relapse with rabbit ATG, cyclosporine, prednisone, and eltrombopag. On 12/27 rabbit ATG test dose was given with no adverse reaction. Cyclosporine was started evening of 12/26. Rabbit ATG started 12/29, patient with reaction upon increase in flow rate with fevers, rigors. Patient re-challenged 12/30, again had reaction after completion of Day 1 dose. ATG was subsequently held, but then restarted on 1/3 and completed remaining 5 day course on 1/6. Cyclosporine levels were checked biweekly, dose adjusted as needed based on level. Ultimately was discharged on 450 mg BID cyclosporine. Patient was seen by ENT for sinusitis and treated with Unasyn thru 1/19. Patient remained neutropenic and started ppx w/ levaquin on 1/20. [de-identified] : - Markedly hypocellular bone marrow (mostly 5% and focal 10-\par 15%) with focal minimal erythropoiesis, minimal to absent\par myelopoiesis, absent megakaryocytes, increased iron stores. Aplastic bone marrow.  [de-identified] : Cytogenetics: \par \par Result: Male karyotype\par Karyotype: 46,XY                {12                } [de-identified] : very severe Aplastic Anemia [FreeTextEntry1] : Therapy initiated 4/23/2020 [de-identified] : Patient is here today with girlfriend for follow up. He is currently feeling a little better than he was when last seen in the office. His joint pain has improved since lowering doses of Promacta and CSA. He does continue to get intermittent nausea in the am when he takes his pills but Reglan helps to resolve. No abdominal pain or vomiting. His appetite is ok. No further low grade temps. He bit the inside of his mouth yesterday, there was no bleeding. Has headaches at times that resolve with Tylenol, he denies any dizziness, blurred vision, or any other neurologic complaints associated with them. Denies any bruising.

## 2022-02-03 NOTE — ASSESSMENT
[FreeTextEntry1] : 37 y/o M presented April 2020 with aplastic anemia (very severe AA) confirmed on bone marrow biopsy at [facility] and now s/p inpatient treatment beginning 4/23/2020 with horse ATG + CsA + promacta with complete remission. Bone marrow bx on 11/9/20 revealed hypocellular marrow, overall cellularity improved (20-50%) showing marrow regeneration. BMBx on 5/18/2021 showing normal morphology with normal cellularity. Patient with worsening cytopenias in November 2021 and with neutropenic fever as well, sent inpatient, s/p treatment with rabbit ATG, prednisone, cyclosporine and Promacta, here for outpatient follow up. \par \par -Patient remains pancytopenia. Severe neutropenia. \par -Severe neutropenia - continue with prophylaxis with levaquin, diflucan, and acyclovir.\par -Jaundiced and labs from 1/24 showing increase in bilirubin to 2.5 and elevated ALT to 297. Likely Promacta related. Patient discharged on 300 mg daily Promacta, had been taking 150 mg inpatient. Was decreased back to 150 mg daily CMP continues to show improvement with T. Bili and ALT. Patient advised that if any worsening feeling of malaise, nausea /vomiting, or abdominal pain he should report directly to [facility] ED. He understands and agrees. Also informed him if he or his partner notice him becoming more jaundiced to report to the [facility] ED. \par -Patient with plts 14 today, will need plt transfusion and continue possible platelets TIW as well as PRBCs as needed. He is s/p 2 unit on 2/1 and Hgb today is 8.8.  \par -Patient no longer with low grade temperatures but still neutropenic.  Informed patient if he has any fevers he needs to immediately present to [facility] ED. He understands and agrees. \par -Continuing cyclosporine, was decreased on 1/28 to 400 mg BID, rechecked this morning at true trough level and now 550. He was instructed to reduce dose to 350mg BID and this will be rechecked next week.\par -Monitoring Cr closely as well to monitor for toxicity due to cyclosporine, now normalized.\par -Once again discussed with patient the indication for allogeneic transplant. He prefers to wait and see if the immunosuppression works again, but is ready to pursue transplant if this fails. \par -Patient understands and agrees with plan. All information explained to the best of my ability.\par -RTC in 1 week.

## 2022-02-03 NOTE — REVIEW OF SYSTEMS
[Joint Pain] : joint pain [Negative] : Neurological [Eye Pain] : no eye pain [Vision Problems] : no vision problems [Dysphagia] : no dysphagia [Nosebleeds] : no nosebleeds [Odynophagia] : no odynophagia [Chest Pain] : no chest pain [Palpitations] : no palpitations [Lower Ext Edema] : no lower extremity edema [Abdominal Pain] : no abdominal pain [Vomiting] : no vomiting [Constipation] : no constipation [Diarrhea] : no diarrhea [Joint Stiffness] : no joint stiffness [Muscle Pain] : no muscle pain [Skin Rash] : no skin rash [Skin Wound] : no skin wound [Insomnia] : no insomnia [Anxiety] : no anxiety [Hot Flashes] : no hot flashes [FreeTextEntry7] : (+)nausea per HPI [FreeTextEntry9] : per HPI [de-identified] : inside left side of mouth

## 2022-02-04 ENCOUNTER — OUTPATIENT (OUTPATIENT)
Dept: OUTPATIENT SERVICES | Facility: HOSPITAL | Age: 37
LOS: 1 days | End: 2022-02-04
Payer: COMMERCIAL

## 2022-02-04 DIAGNOSIS — D64.9 ANEMIA, UNSPECIFIED: ICD-10-CM

## 2022-02-04 DIAGNOSIS — Z98.890 OTHER SPECIFIED POSTPROCEDURAL STATES: Chronic | ICD-10-CM

## 2022-02-04 LAB
ALBUMIN SERPL ELPH-MCNC: 4.4 G/DL
ALP BLD-CCNC: 119 U/L
ALT SERPL-CCNC: 49 U/L
ANION GAP SERPL CALC-SCNC: 15 MMOL/L
AST SERPL-CCNC: 22 U/L
BILIRUB SERPL-MCNC: 1.6 MG/DL
BUN SERPL-MCNC: 30 MG/DL
CALCIUM SERPL-MCNC: 9.3 MG/DL
CHLORIDE SERPL-SCNC: 104 MMOL/L
CO2 SERPL-SCNC: 20 MMOL/L
CREAT SERPL-MCNC: 1.2 MG/DL
CYCLOSPORINE SER-MCNC: 515 NG/ML
GLUCOSE SERPL-MCNC: 142 MG/DL
LDH SERPL-CCNC: 325 U/L
POTASSIUM SERPL-SCNC: 4.2 MMOL/L
PROT SERPL-MCNC: 8.1 G/DL
SODIUM SERPL-SCNC: 139 MMOL/L

## 2022-02-05 ENCOUNTER — RESULT REVIEW (OUTPATIENT)
Age: 37
End: 2022-02-05

## 2022-02-05 ENCOUNTER — APPOINTMENT (OUTPATIENT)
Dept: INFUSION THERAPY | Facility: HOSPITAL | Age: 37
End: 2022-02-05

## 2022-02-05 LAB
BASOPHILS # BLD AUTO: 0.01 K/UL — SIGNIFICANT CHANGE UP (ref 0–0.2)
BASOPHILS NFR BLD AUTO: 0.6 % — SIGNIFICANT CHANGE UP (ref 0–2)
EOSINOPHIL # BLD AUTO: 0.01 K/UL — SIGNIFICANT CHANGE UP (ref 0–0.5)
EOSINOPHIL NFR BLD AUTO: 0.6 % — SIGNIFICANT CHANGE UP (ref 0–6)
HCT VFR BLD CALC: 24.5 % — LOW (ref 39–50)
HGB BLD-MCNC: 8.7 G/DL — LOW (ref 13–17)
IMM GRANULOCYTES NFR BLD AUTO: 1.9 % — HIGH (ref 0–1.5)
LYMPHOCYTES # BLD AUTO: 0.9 K/UL — LOW (ref 1–3.3)
LYMPHOCYTES # BLD AUTO: 58.4 % — HIGH (ref 13–44)
MCHC RBC-ENTMCNC: 29.8 PG — SIGNIFICANT CHANGE UP (ref 27–34)
MCHC RBC-ENTMCNC: 35.5 G/DL — SIGNIFICANT CHANGE UP (ref 32–36)
MCV RBC AUTO: 83.9 FL — SIGNIFICANT CHANGE UP (ref 80–100)
MONOCYTES # BLD AUTO: 0.2 K/UL — SIGNIFICANT CHANGE UP (ref 0–0.9)
MONOCYTES NFR BLD AUTO: 13 % — SIGNIFICANT CHANGE UP (ref 2–14)
NEUTROPHILS # BLD AUTO: 0.39 K/UL — LOW (ref 1.8–7.4)
NEUTROPHILS NFR BLD AUTO: 25.5 % — LOW (ref 43–77)
NRBC # BLD: 0 /100 WBCS — SIGNIFICANT CHANGE UP (ref 0–0)
PLATELET # BLD AUTO: 28 K/UL — LOW (ref 150–400)
RBC # BLD: 2.92 M/UL — LOW (ref 4.2–5.8)
RBC # FLD: 13.6 % — SIGNIFICANT CHANGE UP (ref 10.3–14.5)
WBC # BLD: 1.54 K/UL — LOW (ref 3.8–10.5)
WBC # FLD AUTO: 1.54 K/UL — LOW (ref 3.8–10.5)

## 2022-02-08 ENCOUNTER — RESULT REVIEW (OUTPATIENT)
Age: 37
End: 2022-02-08

## 2022-02-08 ENCOUNTER — APPOINTMENT (OUTPATIENT)
Dept: HEMATOLOGY ONCOLOGY | Facility: CLINIC | Age: 37
End: 2022-02-08
Payer: COMMERCIAL

## 2022-02-08 ENCOUNTER — APPOINTMENT (OUTPATIENT)
Dept: INFUSION THERAPY | Facility: HOSPITAL | Age: 37
End: 2022-02-08

## 2022-02-08 VITALS
DIASTOLIC BLOOD PRESSURE: 94 MMHG | HEIGHT: 68.11 IN | OXYGEN SATURATION: 96 % | RESPIRATION RATE: 19 BRPM | BODY MASS INDEX: 35.08 KG/M2 | HEART RATE: 118 BPM | WEIGHT: 231.49 LBS | TEMPERATURE: 98.2 F | SYSTOLIC BLOOD PRESSURE: 137 MMHG

## 2022-02-08 LAB
BASOPHILS # BLD AUTO: 0.01 K/UL — SIGNIFICANT CHANGE UP (ref 0–0.2)
BASOPHILS NFR BLD AUTO: 0.4 % — SIGNIFICANT CHANGE UP (ref 0–2)
EOSINOPHIL # BLD AUTO: 0.01 K/UL — SIGNIFICANT CHANGE UP (ref 0–0.5)
EOSINOPHIL NFR BLD AUTO: 0.4 % — SIGNIFICANT CHANGE UP (ref 0–6)
HCT VFR BLD CALC: 23.1 % — LOW (ref 39–50)
HGB BLD-MCNC: 8.3 G/DL — LOW (ref 13–17)
IMM GRANULOCYTES NFR BLD AUTO: 3.7 % — HIGH (ref 0–1.5)
LYMPHOCYTES # BLD AUTO: 1.45 K/UL — SIGNIFICANT CHANGE UP (ref 1–3.3)
LYMPHOCYTES # BLD AUTO: 59.2 % — HIGH (ref 13–44)
MCHC RBC-ENTMCNC: 29.7 PG — SIGNIFICANT CHANGE UP (ref 27–34)
MCHC RBC-ENTMCNC: 35.9 G/DL — SIGNIFICANT CHANGE UP (ref 32–36)
MCV RBC AUTO: 82.8 FL — SIGNIFICANT CHANGE UP (ref 80–100)
MONOCYTES # BLD AUTO: 0.46 K/UL — SIGNIFICANT CHANGE UP (ref 0–0.9)
MONOCYTES NFR BLD AUTO: 18.8 % — HIGH (ref 2–14)
NEUTROPHILS # BLD AUTO: 0.43 K/UL — LOW (ref 1.8–7.4)
NEUTROPHILS NFR BLD AUTO: 17.5 % — LOW (ref 43–77)
NRBC # BLD: 0 /100 WBCS — SIGNIFICANT CHANGE UP (ref 0–0)
PLATELET # BLD AUTO: 10 K/UL — CRITICAL LOW (ref 150–400)
RBC # BLD: 2.79 M/UL — LOW (ref 4.2–5.8)
RBC # FLD: 13.1 % — SIGNIFICANT CHANGE UP (ref 10.3–14.5)
WBC # BLD: 2.45 K/UL — LOW (ref 3.8–10.5)
WBC # FLD AUTO: 2.45 K/UL — LOW (ref 3.8–10.5)

## 2022-02-08 PROCEDURE — 99215 OFFICE O/P EST HI 40 MIN: CPT

## 2022-02-08 NOTE — REVIEW OF SYSTEMS
[Joint Pain] : joint pain [Negative] : Heme/Lymph [Eye Pain] : no eye pain [Vision Problems] : no vision problems [Dysphagia] : no dysphagia [Nosebleeds] : no nosebleeds [Odynophagia] : no odynophagia [Chest Pain] : no chest pain [Palpitations] : no palpitations [Lower Ext Edema] : no lower extremity edema [Abdominal Pain] : no abdominal pain [Vomiting] : no vomiting [Constipation] : no constipation [Diarrhea] : no diarrhea [Joint Stiffness] : no joint stiffness [Muscle Pain] : no muscle pain [Skin Rash] : no skin rash [Skin Wound] : no skin wound [Insomnia] : no insomnia [Anxiety] : no anxiety [Hot Flashes] : no hot flashes [FreeTextEntry7] : (+)nausea per HPI [FreeTextEntry9] : per HPI [de-identified] : inside left side of mouth

## 2022-02-08 NOTE — PHYSICAL EXAM
[Fully active, able to carry on all pre-disease performance without restriction] : Status 0 - Fully active, able to carry on all pre-disease performance without restriction [Normal] : affect appropriate [Ulcers] : no ulcers [Mucositis] : no mucositis [Thrush] : no thrush [Vesicles] : no vesicles [de-identified] : jaundiced appearing [de-identified] : EOMI, scleral icterus [de-identified] : (+) area on left buccal mucosa with bruising where he bit his mouth yesterday no signs of infection [de-identified] : supple [de-identified] : (+) S1S2 RRR [de-identified] : no edema (+)pp [de-identified] : no tenderness [de-identified] : ROM intact [de-identified] : warm/dry [de-identified] : A&Ox3

## 2022-02-08 NOTE — ASSESSMENT
[FreeTextEntry1] : 37 y/o M presented April 2020 with aplastic anemia (very severe AA) confirmed on bone marrow biopsy at [facility] and now s/p inpatient treatment beginning 4/23/2020 with horse ATG + CsA + promacta with complete remission. Bone marrow bx on 11/9/20 revealed hypocellular marrow, overall cellularity improved (20-50%) showing marrow regeneration. BMBx on 5/18/2021 showing normal morphology with normal cellularity. Patient with worsening cytopenias in November 2021 and with neutropenic fever as well, sent inpatient, s/p treatment with rabbit ATG, prednisone, cyclosporine and Promacta, here for outpatient follow up. \par \par -Patient remains pancytopenia. Severe neutropenia. However, on today's CBC neutrophils beginning to uptrend, offered encouragement to the patient today in the office. \par -Severe neutropenia - continue with prophylaxis with levaquin, diflucan, and acyclovir.\par -Jaundiced and labs from 1/24 showing increase in bilirubin to 2.5 and elevated ALT to 297. Likely Promacta related. Patient discharged on 300 mg daily Promacta, had been taking 150 mg inpatient. Was decreased back to 150 mg daily CMP continues to show improvement with T. Bili and ALT. Patient advised that if any worsening feeling of malaise, nausea /vomiting, or abdominal pain he should report directly to [facility] ED. He understands and agrees. Also informed him if he or his partner notice him becoming more jaundiced to report to the [facility] ED. \par -Patient with plts 10 today, will need plt transfusion and continue possible platelets TIW as well as PRBCs as needed. \par -Patient no longer with low grade temperatures but still neutropenic.  Informed patient if he has any fevers he needs to immediately present to [facility] ED. He understands and agrees. \par -Continuing cyclosporine, was decreased on 1/28 to 400 mg BID, rechecked this morning at true trough level and then became 550. He was instructed to reduce dose to 350mg BID at that point. Rechecking morning of 2/10. \par -Monitoring Cr closely as well to monitor for toxicity due to cyclosporine, now normalized.\par -Patient with sinus tachycardia today, likely due to nausea and vomiting yesterday / dehydration. Will add on a liter of normal saline to the treatment room today. \par -Once again discussed with patient the indication for allogeneic transplant. He prefers to wait and see if the immunosuppression works again, but is ready to pursue transplant if this fails. \par -Patient understands and agrees with plan. All information explained to the best of my ability.\par -RTC in 1 week.

## 2022-02-08 NOTE — HISTORY OF PRESENT ILLNESS
[Disease:__________________________] : Disease: [unfilled] [Therapy: ___] : Therapy: [unfilled] [de-identified] : 35 y/o M with no PMH presented to OSH originally for c/o dizziness and feeling unwell and was then transferred to [facility] for further hematologic evaluation as patient was found to be severely pancytopenic. Patient first started noticing scattered bruising throughout his body (mostly thighs and arms) about 6 weeks prior to presentation, and also had epistaxis. Pt had also noticed intermittent fevers and chills, responsive to tylenol, and noticed very dark stools for the same time period. Also with spontaneous gum bleeding and endorsed severe fatigue and SOB with exertion. Also noted 15 lbs weight loss, partially intentional over past 2-3 months prior to presentation.  Also c/o mild headache located in posterior aspect of his head, denies sore throat, cough, +nausea but no vomiting, no abdominal pain, no diarrhea, no BRBPR, no urinary symptoms, no LE swelling, +visual changes.     \par \par Patient was transferred to [facility] from [facility] on 4/18. Patient was started on IVF, Levaquin, and allopurinol. Patient transfused blood and platelets. Bone Marrow biopsy performed on 4/20 consistent with Aplastic Anemia. 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 Because of this, patient's platelet transfusion threshold was made 50k. Horse ATG test dose was applied 4/22 with no reaction. Treatment started 4/23 consisting of equine ATG at 40mg/kg/day x 4 days, Cyclosporine 10mg/kg/day divided doses BID, Eltrombopag 150mg daily. Patient had episode of hives with ATG infusion on Day 1 thus steroid regimen was changed from oral prednisone to IV Solumedrol Q8 for the duration of ATG treatment and transitioned back to oral prednisone 1mg/kg daily for days 5-10 then tapered. The patient had grade 3 transaminitis likely from Promacta and ATG and Promacta was held as of 5/1. While patient was in the hospital, his course was also complicated by epigastric pain consistent with dyspepsia, most likely due to cyclosporine. Was managed on pepcid BID and sucralfate q6 hours and mylanta as needed with good relief. \par \par On 5/12 the patient's transaminitis had improved(grade 2 total bili, grade 1 AST, grade 2 ALT) and Promacta 150 mg po QD was resumed. Pt's cyclosporine dose was adjusted  according to the level (goal 200-400) and he was on 400 mg po q12h as of 5/18. On 5/16, patient developed chest discomfort during platelet transfusion which improved with benadryl. EKG and CXR were wnl. Patient originally refused benadryl pre-medication prior to platelets that day due to side effects with taking Benadryl, and it was decided to premedicate pt with Claritin or Zyrtec pre blood transfusion instead. On 5/20, cyclosporine level was 460, therefore dose was lowered to 300 mg PO BID. Patient's vision had improved, so platelet transfusion threshold liberalized to 20k. \par \par Patient has since had a complete recovery of blood counts. He discontinued Promacta after 6 months of therapy. He has been monitored since then wit stable CBC and therapeutic cyclosporine levels. \par \par He has since had repeat bone marrow biopsy on 5/18/2021 with normal morphology and cellularity\par \par In September 2021 noted his platelets dropping, and ultimately slowly was becoming pancytopenia. He was restarted on cyclosporine at home, but ultimately was admitted in late December with neutropenic fever. Bone marrow biopsy was repeated on 12/2021 and showed decreased megakaryocytes and other findings consistent with relapse aplastic anemia. Patient was transferred to [address] [place] [address] on 12/23 to start treatment for relapse with rabbit ATG, cyclosporine, prednisone, and eltrombopag. On 12/27 rabbit ATG test dose was given with no adverse reaction. Cyclosporine was started evening of 12/26. Rabbit ATG started 12/29, patient with reaction upon increase in flow rate with fevers, rigors. Patient re-challenged 12/30, again had reaction after completion of Day 1 dose. ATG was subsequently held, but then restarted on 1/3 and completed remaining 5 day course on 1/6. Cyclosporine levels were checked biweekly, dose adjusted as needed based on level. Ultimately was discharged on 450 mg BID cyclosporine. Patient was seen by ENT for sinusitis and treated with Unasyn thru 1/19. Patient remained neutropenic and started ppx w/ levaquin on 1/20. [de-identified] : - Markedly hypocellular bone marrow (mostly 5% and focal 10-\par 15%) with focal minimal erythropoiesis, minimal to absent\par myelopoiesis, absent megakaryocytes, increased iron stores. Aplastic bone marrow.  [de-identified] : Cytogenetics: \par \par Result: Male karyotype\par Karyotype: 46,XY                 {12                 } [de-identified] : very severe Aplastic Anemia [FreeTextEntry1] : Therapy initiated 4/23/2020 [de-identified] : Patient is here today again with girlfriend for follow up. He reported vomiting on 2/7/2022 and he feels this was due to the fact that he went a very long time without eating. He did still have a little bit of nausea today and he had been able to eat breakfast and lunch but maybe not as much. He had been hydrating. Denied any chest pain or palpitations. Denied any fevers at home. Reported a little bit of lightheadedness upon waking up in the morning but that went away. Denied dyspnea. Weekend 2/5-6 he had some diarrhea but that had resolved.

## 2022-02-10 ENCOUNTER — RESULT REVIEW (OUTPATIENT)
Age: 37
End: 2022-02-10

## 2022-02-10 ENCOUNTER — APPOINTMENT (OUTPATIENT)
Dept: INFUSION THERAPY | Facility: HOSPITAL | Age: 37
End: 2022-02-10

## 2022-02-10 ENCOUNTER — NON-APPOINTMENT (OUTPATIENT)
Age: 37
End: 2022-02-10

## 2022-02-10 ENCOUNTER — APPOINTMENT (OUTPATIENT)
Dept: HEMATOLOGY ONCOLOGY | Facility: CLINIC | Age: 37
End: 2022-02-10

## 2022-02-10 LAB
BASOPHILS # BLD AUTO: 0 K/UL — SIGNIFICANT CHANGE UP (ref 0–0.2)
BASOPHILS NFR BLD AUTO: 0 % — SIGNIFICANT CHANGE UP (ref 0–2)
BLD GP AB SCN SERPL QL: NEGATIVE — SIGNIFICANT CHANGE UP
EOSINOPHIL # BLD AUTO: 0 K/UL — SIGNIFICANT CHANGE UP (ref 0–0.5)
EOSINOPHIL NFR BLD AUTO: 0 % — SIGNIFICANT CHANGE UP (ref 0–6)
HCT VFR BLD CALC: 19.7 % — CRITICAL LOW (ref 39–50)
HGB BLD-MCNC: 7 G/DL — CRITICAL LOW (ref 13–17)
IMM GRANULOCYTES NFR BLD AUTO: 0.5 % — SIGNIFICANT CHANGE UP (ref 0–1.5)
LYMPHOCYTES # BLD AUTO: 1.34 K/UL — SIGNIFICANT CHANGE UP (ref 1–3.3)
LYMPHOCYTES # BLD AUTO: 67.7 % — HIGH (ref 13–44)
MCHC RBC-ENTMCNC: 30 PG — SIGNIFICANT CHANGE UP (ref 27–34)
MCHC RBC-ENTMCNC: 35.5 G/DL — SIGNIFICANT CHANGE UP (ref 32–36)
MCV RBC AUTO: 84.5 FL — SIGNIFICANT CHANGE UP (ref 80–100)
MONOCYTES # BLD AUTO: 0.24 K/UL — SIGNIFICANT CHANGE UP (ref 0–0.9)
MONOCYTES NFR BLD AUTO: 12.1 % — SIGNIFICANT CHANGE UP (ref 2–14)
NEUTROPHILS # BLD AUTO: 0.39 K/UL — LOW (ref 1.8–7.4)
NEUTROPHILS NFR BLD AUTO: 19.7 % — LOW (ref 43–77)
NRBC # BLD: 0 /100 WBCS — SIGNIFICANT CHANGE UP (ref 0–0)
PLATELET # BLD AUTO: 26 K/UL — LOW (ref 150–400)
RBC # BLD: 2.33 M/UL — LOW (ref 4.2–5.8)
RBC # FLD: 13 % — SIGNIFICANT CHANGE UP (ref 10.3–14.5)
RH IG SCN BLD-IMP: POSITIVE — SIGNIFICANT CHANGE UP
WBC # BLD: 1.98 K/UL — LOW (ref 3.8–10.5)
WBC # FLD AUTO: 1.98 K/UL — LOW (ref 3.8–10.5)

## 2022-02-10 PROCEDURE — 86923 COMPATIBILITY TEST ELECTRIC: CPT

## 2022-02-10 PROCEDURE — 86901 BLOOD TYPING SEROLOGIC RH(D): CPT

## 2022-02-10 PROCEDURE — 86850 RBC ANTIBODY SCREEN: CPT

## 2022-02-10 PROCEDURE — 86900 BLOOD TYPING SEROLOGIC ABO: CPT

## 2022-02-12 ENCOUNTER — APPOINTMENT (OUTPATIENT)
Dept: INFUSION THERAPY | Facility: HOSPITAL | Age: 37
End: 2022-02-12

## 2022-02-12 ENCOUNTER — RESULT REVIEW (OUTPATIENT)
Age: 37
End: 2022-02-12

## 2022-02-12 LAB
BASOPHILS # BLD AUTO: 0 K/UL — SIGNIFICANT CHANGE UP (ref 0–0.2)
BASOPHILS NFR BLD AUTO: 0 % — SIGNIFICANT CHANGE UP (ref 0–2)
EOSINOPHIL # BLD AUTO: 0 K/UL — SIGNIFICANT CHANGE UP (ref 0–0.5)
EOSINOPHIL NFR BLD AUTO: 0 % — SIGNIFICANT CHANGE UP (ref 0–6)
HCT VFR BLD CALC: 18.2 % — CRITICAL LOW (ref 39–50)
HGB BLD-MCNC: 6.6 G/DL — CRITICAL LOW (ref 13–17)
IMM GRANULOCYTES NFR BLD AUTO: 0.4 % — SIGNIFICANT CHANGE UP (ref 0–1.5)
LYMPHOCYTES # BLD AUTO: 1.9 K/UL — SIGNIFICANT CHANGE UP (ref 1–3.3)
LYMPHOCYTES # BLD AUTO: 73.6 % — HIGH (ref 13–44)
MCHC RBC-ENTMCNC: 30.4 PG — SIGNIFICANT CHANGE UP (ref 27–34)
MCHC RBC-ENTMCNC: 36.3 G/DL — HIGH (ref 32–36)
MCV RBC AUTO: 83.9 FL — SIGNIFICANT CHANGE UP (ref 80–100)
MONOCYTES # BLD AUTO: 0.26 K/UL — SIGNIFICANT CHANGE UP (ref 0–0.9)
MONOCYTES NFR BLD AUTO: 10.1 % — SIGNIFICANT CHANGE UP (ref 2–14)
NEUTROPHILS # BLD AUTO: 0.41 K/UL — LOW (ref 1.8–7.4)
NEUTROPHILS NFR BLD AUTO: 15.9 % — LOW (ref 43–77)
NRBC # BLD: 0 /100 WBCS — SIGNIFICANT CHANGE UP (ref 0–0)
PLATELET # BLD AUTO: 16 K/UL — CRITICAL LOW (ref 150–400)
RBC # BLD: 2.17 M/UL — LOW (ref 4.2–5.8)
RBC # FLD: 12.7 % — SIGNIFICANT CHANGE UP (ref 10.3–14.5)
WBC # BLD: 2.58 K/UL — LOW (ref 3.8–10.5)
WBC # FLD AUTO: 2.58 K/UL — LOW (ref 3.8–10.5)

## 2022-02-15 ENCOUNTER — RESULT REVIEW (OUTPATIENT)
Age: 37
End: 2022-02-15

## 2022-02-15 ENCOUNTER — NON-APPOINTMENT (OUTPATIENT)
Age: 37
End: 2022-02-15

## 2022-02-15 ENCOUNTER — APPOINTMENT (OUTPATIENT)
Dept: INFUSION THERAPY | Facility: HOSPITAL | Age: 37
End: 2022-02-15

## 2022-02-15 LAB
BASOPHILS # BLD AUTO: 0.01 K/UL — SIGNIFICANT CHANGE UP (ref 0–0.2)
BASOPHILS NFR BLD AUTO: 0.3 % — SIGNIFICANT CHANGE UP (ref 0–2)
EOSINOPHIL # BLD AUTO: 0.06 K/UL — SIGNIFICANT CHANGE UP (ref 0–0.5)
EOSINOPHIL NFR BLD AUTO: 1.7 % — SIGNIFICANT CHANGE UP (ref 0–6)
HCT VFR BLD CALC: 20.5 % — CRITICAL LOW (ref 39–50)
HGB BLD-MCNC: 7.6 G/DL — LOW (ref 13–17)
IMM GRANULOCYTES NFR BLD AUTO: 1.2 % — SIGNIFICANT CHANGE UP (ref 0–1.5)
LYMPHOCYTES # BLD AUTO: 2.28 K/UL — SIGNIFICANT CHANGE UP (ref 1–3.3)
LYMPHOCYTES # BLD AUTO: 66.3 % — HIGH (ref 13–44)
MCHC RBC-ENTMCNC: 30.5 PG — SIGNIFICANT CHANGE UP (ref 27–34)
MCHC RBC-ENTMCNC: 37.1 G/DL — HIGH (ref 32–36)
MCV RBC AUTO: 82.3 FL — SIGNIFICANT CHANGE UP (ref 80–100)
MONOCYTES # BLD AUTO: 0.48 K/UL — SIGNIFICANT CHANGE UP (ref 0–0.9)
MONOCYTES NFR BLD AUTO: 14 % — SIGNIFICANT CHANGE UP (ref 2–14)
NEUTROPHILS # BLD AUTO: 0.57 K/UL — LOW (ref 1.8–7.4)
NEUTROPHILS NFR BLD AUTO: 16.5 % — LOW (ref 43–77)
NRBC # BLD: 3 /100 WBCS — HIGH (ref 0–0)
PLATELET # BLD AUTO: 14 K/UL — CRITICAL LOW (ref 150–400)
RBC # BLD: 2.49 M/UL — LOW (ref 4.2–5.8)
RBC # FLD: 13.2 % — SIGNIFICANT CHANGE UP (ref 10.3–14.5)
WBC # BLD: 3.44 K/UL — LOW (ref 3.8–10.5)
WBC # FLD AUTO: 3.44 K/UL — LOW (ref 3.8–10.5)

## 2022-02-17 ENCOUNTER — RESULT REVIEW (OUTPATIENT)
Age: 37
End: 2022-02-17

## 2022-02-17 ENCOUNTER — APPOINTMENT (OUTPATIENT)
Dept: INFUSION THERAPY | Facility: HOSPITAL | Age: 37
End: 2022-02-17

## 2022-02-17 ENCOUNTER — APPOINTMENT (OUTPATIENT)
Dept: HEMATOLOGY ONCOLOGY | Facility: CLINIC | Age: 37
End: 2022-02-17
Payer: COMMERCIAL

## 2022-02-17 LAB
ALBUMIN SERPL ELPH-MCNC: 3.9 G/DL — SIGNIFICANT CHANGE UP (ref 3.3–5)
ALP SERPL-CCNC: 99 U/L — SIGNIFICANT CHANGE UP (ref 40–120)
ALT FLD-CCNC: <5 U/L — LOW (ref 10–45)
ANION GAP SERPL CALC-SCNC: 16 MMOL/L — SIGNIFICANT CHANGE UP (ref 5–17)
AST SERPL-CCNC: 22 U/L — SIGNIFICANT CHANGE UP (ref 10–40)
BASOPHILS # BLD AUTO: 0 K/UL — SIGNIFICANT CHANGE UP (ref 0–0.2)
BASOPHILS NFR BLD AUTO: 0 % — SIGNIFICANT CHANGE UP (ref 0–2)
BILIRUB SERPL-MCNC: 0.9 MG/DL — SIGNIFICANT CHANGE UP (ref 0.2–1.2)
BUN SERPL-MCNC: 27 MG/DL — HIGH (ref 7–23)
CALCIUM SERPL-MCNC: 8.8 MG/DL — SIGNIFICANT CHANGE UP (ref 8.4–10.5)
CHLORIDE SERPL-SCNC: 105 MMOL/L — SIGNIFICANT CHANGE UP (ref 96–108)
CO2 SERPL-SCNC: 17 MMOL/L — LOW (ref 22–31)
CREAT SERPL-MCNC: 1.36 MG/DL — HIGH (ref 0.5–1.3)
CYCLOSPORINE SER-MCNC: 319 NG/ML — SIGNIFICANT CHANGE UP (ref 150–400)
EOSINOPHIL # BLD AUTO: 0 K/UL — SIGNIFICANT CHANGE UP (ref 0–0.5)
EOSINOPHIL NFR BLD AUTO: 0 % — SIGNIFICANT CHANGE UP (ref 0–6)
GLUCOSE SERPL-MCNC: 116 MG/DL — HIGH (ref 70–99)
HCT VFR BLD CALC: 19.9 % — CRITICAL LOW (ref 39–50)
HGB BLD-MCNC: 7.4 G/DL — LOW (ref 13–17)
LDH SERPL L TO P-CCNC: 348 U/L — HIGH (ref 50–242)
LYMPHOCYTES # BLD AUTO: 1.06 K/UL — SIGNIFICANT CHANGE UP (ref 1–3.3)
LYMPHOCYTES # BLD AUTO: 53 % — HIGH (ref 13–44)
MCHC RBC-ENTMCNC: 30.5 PG — SIGNIFICANT CHANGE UP (ref 27–34)
MCHC RBC-ENTMCNC: 37.2 G/DL — HIGH (ref 32–36)
MCV RBC AUTO: 82 FL — SIGNIFICANT CHANGE UP (ref 80–100)
MONOCYTES # BLD AUTO: 0.22 K/UL — SIGNIFICANT CHANGE UP (ref 0–0.9)
MONOCYTES NFR BLD AUTO: 11 % — SIGNIFICANT CHANGE UP (ref 2–14)
NEUTROPHILS # BLD AUTO: 0.72 K/UL — LOW (ref 1.8–7.4)
NEUTROPHILS NFR BLD AUTO: 36 % — LOW (ref 43–77)
NRBC # BLD: 0 /100 — SIGNIFICANT CHANGE UP (ref 0–0)
NRBC # BLD: SIGNIFICANT CHANGE UP /100 WBCS (ref 0–0)
PLAT MORPH BLD: NORMAL — SIGNIFICANT CHANGE UP
PLATELET # BLD AUTO: 29 K/UL — LOW (ref 150–400)
POTASSIUM SERPL-MCNC: 4.1 MMOL/L — SIGNIFICANT CHANGE UP (ref 3.5–5.3)
POTASSIUM SERPL-SCNC: 4.1 MMOL/L — SIGNIFICANT CHANGE UP (ref 3.5–5.3)
PROT SERPL-MCNC: 7.3 G/DL — SIGNIFICANT CHANGE UP (ref 6–8.3)
RBC # BLD: 2.39 M/UL — LOW (ref 4.2–5.8)
RBC # FLD: 13.6 % — SIGNIFICANT CHANGE UP (ref 10.3–14.5)
RBC BLD AUTO: SIGNIFICANT CHANGE UP
SODIUM SERPL-SCNC: 139 MMOL/L — SIGNIFICANT CHANGE UP (ref 135–145)
WBC # BLD: 2 K/UL — LOW (ref 3.8–10.5)
WBC # FLD AUTO: 2 K/UL — LOW (ref 3.8–10.5)

## 2022-02-17 PROCEDURE — 99213 OFFICE O/P EST LOW 20 MIN: CPT

## 2022-02-17 RX ORDER — CYCLOSPORINE 100 MG/1
100 CAPSULE, LIQUID FILLED ORAL TWICE DAILY
Qty: 240 | Refills: 4 | Status: DISCONTINUED | COMMUNITY
Start: 2021-12-08 | End: 2022-02-17

## 2022-02-18 ENCOUNTER — NON-APPOINTMENT (OUTPATIENT)
Age: 37
End: 2022-02-18

## 2022-02-19 ENCOUNTER — RESULT REVIEW (OUTPATIENT)
Age: 37
End: 2022-02-19

## 2022-02-19 ENCOUNTER — APPOINTMENT (OUTPATIENT)
Dept: INFUSION THERAPY | Facility: HOSPITAL | Age: 37
End: 2022-02-19

## 2022-02-19 LAB
BASOPHILS # BLD AUTO: 0.02 K/UL — SIGNIFICANT CHANGE UP (ref 0–0.2)
BASOPHILS NFR BLD AUTO: 0.8 % — SIGNIFICANT CHANGE UP (ref 0–2)
EOSINOPHIL # BLD AUTO: 0.01 K/UL — SIGNIFICANT CHANGE UP (ref 0–0.5)
EOSINOPHIL NFR BLD AUTO: 0.4 % — SIGNIFICANT CHANGE UP (ref 0–6)
HCT VFR BLD CALC: 25.7 % — LOW (ref 39–50)
HGB BLD-MCNC: 9.4 G/DL — LOW (ref 13–17)
IMM GRANULOCYTES NFR BLD AUTO: 3.7 % — HIGH (ref 0–1.5)
LYMPHOCYTES # BLD AUTO: 1.22 K/UL — SIGNIFICANT CHANGE UP (ref 1–3.3)
LYMPHOCYTES # BLD AUTO: 50.6 % — HIGH (ref 13–44)
MCHC RBC-ENTMCNC: 30.2 PG — SIGNIFICANT CHANGE UP (ref 27–34)
MCHC RBC-ENTMCNC: 36.6 G/DL — HIGH (ref 32–36)
MCV RBC AUTO: 82.6 FL — SIGNIFICANT CHANGE UP (ref 80–100)
MONOCYTES # BLD AUTO: 0.38 K/UL — SIGNIFICANT CHANGE UP (ref 0–0.9)
MONOCYTES NFR BLD AUTO: 15.8 % — HIGH (ref 2–14)
NEUTROPHILS # BLD AUTO: 0.69 K/UL — LOW (ref 1.8–7.4)
NEUTROPHILS NFR BLD AUTO: 28.7 % — LOW (ref 43–77)
NRBC # BLD: 2 /100 WBCS — HIGH (ref 0–0)
PLATELET # BLD AUTO: 26 K/UL — LOW (ref 150–400)
RBC # BLD: 3.11 M/UL — LOW (ref 4.2–5.8)
RBC # FLD: 13.7 % — SIGNIFICANT CHANGE UP (ref 10.3–14.5)
WBC # BLD: 2.41 K/UL — LOW (ref 3.8–10.5)
WBC # FLD AUTO: 2.41 K/UL — LOW (ref 3.8–10.5)

## 2022-02-20 ENCOUNTER — OUTPATIENT (OUTPATIENT)
Dept: OUTPATIENT SERVICES | Facility: HOSPITAL | Age: 37
LOS: 1 days | Discharge: HOME | End: 2022-02-20

## 2022-02-20 DIAGNOSIS — Z98.890 OTHER SPECIFIED POSTPROCEDURAL STATES: Chronic | ICD-10-CM

## 2022-02-20 DIAGNOSIS — D64.9 ANEMIA, UNSPECIFIED: ICD-10-CM

## 2022-02-22 ENCOUNTER — APPOINTMENT (OUTPATIENT)
Dept: INFUSION THERAPY | Facility: HOSPITAL | Age: 37
End: 2022-02-22

## 2022-02-22 ENCOUNTER — RESULT REVIEW (OUTPATIENT)
Age: 37
End: 2022-02-22

## 2022-02-22 ENCOUNTER — NON-APPOINTMENT (OUTPATIENT)
Age: 37
End: 2022-02-22

## 2022-02-22 LAB
ANISOCYTOSIS BLD QL: SLIGHT — SIGNIFICANT CHANGE UP
BASOPHILS # BLD AUTO: 0 K/UL — SIGNIFICANT CHANGE UP (ref 0–0.2)
BASOPHILS NFR BLD AUTO: 0 % — SIGNIFICANT CHANGE UP (ref 0–2)
DACRYOCYTES BLD QL SMEAR: SLIGHT — SIGNIFICANT CHANGE UP
EOSINOPHIL # BLD AUTO: 0 K/UL — SIGNIFICANT CHANGE UP (ref 0–0.5)
EOSINOPHIL NFR BLD AUTO: 0 % — SIGNIFICANT CHANGE UP (ref 0–6)
HCT VFR BLD CALC: 24.9 % — LOW (ref 39–50)
HGB BLD-MCNC: 9.4 G/DL — LOW (ref 13–17)
LYMPHOCYTES # BLD AUTO: 1.22 K/UL — SIGNIFICANT CHANGE UP (ref 1–3.3)
LYMPHOCYTES # BLD AUTO: 56.6 % — HIGH (ref 13–44)
MCHC RBC-ENTMCNC: 31.6 PG — SIGNIFICANT CHANGE UP (ref 27–34)
MCHC RBC-ENTMCNC: 37.8 G/DL — HIGH (ref 32–36)
MCV RBC AUTO: 83.8 FL — SIGNIFICANT CHANGE UP (ref 80–100)
MONOCYTES # BLD AUTO: 0.41 K/UL — SIGNIFICANT CHANGE UP (ref 0–0.9)
MONOCYTES NFR BLD AUTO: 19.2 % — HIGH (ref 2–14)
NEUTROPHILS # BLD AUTO: 0.52 K/UL — LOW (ref 1.8–7.4)
NEUTROPHILS NFR BLD AUTO: 24.2 % — LOW (ref 43–77)
NRBC # BLD: 1 /100 — HIGH (ref 0–0)
NRBC # BLD: SIGNIFICANT CHANGE UP /100 WBCS (ref 0–0)
PLAT MORPH BLD: NORMAL — SIGNIFICANT CHANGE UP
PLATELET # BLD AUTO: 14 K/UL — CRITICAL LOW (ref 150–400)
POIKILOCYTOSIS BLD QL AUTO: SLIGHT — SIGNIFICANT CHANGE UP
RBC # BLD: 2.97 M/UL — LOW (ref 4.2–5.8)
RBC # FLD: 14.4 % — SIGNIFICANT CHANGE UP (ref 10.3–14.5)
RBC BLD AUTO: ABNORMAL
WBC # BLD: 2.15 K/UL — LOW (ref 3.8–10.5)
WBC # FLD AUTO: 2.15 K/UL — LOW (ref 3.8–10.5)

## 2022-02-23 LAB
ALBUMIN SERPL ELPH-MCNC: 4.6 G/DL
ALP BLD-CCNC: 113 U/L
ALT SERPL-CCNC: 28 U/L
ANION GAP SERPL CALC-SCNC: 13 MMOL/L
AST SERPL-CCNC: 19 U/L
BILIRUB SERPL-MCNC: 1.3 MG/DL
BUN SERPL-MCNC: 36 MG/DL
CALCIUM SERPL-MCNC: 9.5 MG/DL
CHLORIDE SERPL-SCNC: 104 MMOL/L
CO2 SERPL-SCNC: 21 MMOL/L
CREAT SERPL-MCNC: 1.39 MG/DL
CYCLOSPORINE SER-MCNC: 455 NG/ML
GLUCOSE SERPL-MCNC: 108 MG/DL
POTASSIUM SERPL-SCNC: 4.4 MMOL/L
PROT SERPL-MCNC: 8.1 G/DL
SODIUM SERPL-SCNC: 139 MMOL/L

## 2022-02-24 ENCOUNTER — RESULT REVIEW (OUTPATIENT)
Age: 37
End: 2022-02-24

## 2022-02-24 ENCOUNTER — NON-APPOINTMENT (OUTPATIENT)
Age: 37
End: 2022-02-24

## 2022-02-24 ENCOUNTER — APPOINTMENT (OUTPATIENT)
Dept: HEMATOLOGY ONCOLOGY | Facility: CLINIC | Age: 37
End: 2022-02-24
Payer: COMMERCIAL

## 2022-02-24 ENCOUNTER — APPOINTMENT (OUTPATIENT)
Dept: INFUSION THERAPY | Facility: HOSPITAL | Age: 37
End: 2022-02-24

## 2022-02-24 VITALS
DIASTOLIC BLOOD PRESSURE: 70 MMHG | TEMPERATURE: 98.1 F | WEIGHT: 231.48 LBS | OXYGEN SATURATION: 97 % | RESPIRATION RATE: 18 BRPM | HEART RATE: 106 BPM | SYSTOLIC BLOOD PRESSURE: 121 MMHG

## 2022-02-24 LAB
BASOPHILS # BLD AUTO: 0 K/UL — SIGNIFICANT CHANGE UP (ref 0–0.2)
BASOPHILS NFR BLD AUTO: 0 % — SIGNIFICANT CHANGE UP (ref 0–2)
EOSINOPHIL # BLD AUTO: 0.01 K/UL — SIGNIFICANT CHANGE UP (ref 0–0.5)
EOSINOPHIL NFR BLD AUTO: 0.6 % — SIGNIFICANT CHANGE UP (ref 0–6)
HCT VFR BLD CALC: 25.6 % — LOW (ref 39–50)
HGB BLD-MCNC: 9 G/DL — LOW (ref 13–17)
IMM GRANULOCYTES NFR BLD AUTO: 0.6 % — SIGNIFICANT CHANGE UP (ref 0–1.5)
LYMPHOCYTES # BLD AUTO: 0.85 K/UL — LOW (ref 1–3.3)
LYMPHOCYTES # BLD AUTO: 48 % — HIGH (ref 13–44)
MCHC RBC-ENTMCNC: 30.3 PG — SIGNIFICANT CHANGE UP (ref 27–34)
MCHC RBC-ENTMCNC: 35.2 G/DL — SIGNIFICANT CHANGE UP (ref 32–36)
MCV RBC AUTO: 86.2 FL — SIGNIFICANT CHANGE UP (ref 80–100)
MONOCYTES # BLD AUTO: 0.26 K/UL — SIGNIFICANT CHANGE UP (ref 0–0.9)
MONOCYTES NFR BLD AUTO: 14.7 % — HIGH (ref 2–14)
NEUTROPHILS # BLD AUTO: 0.64 K/UL — LOW (ref 1.8–7.4)
NEUTROPHILS NFR BLD AUTO: 36.1 % — LOW (ref 43–77)
NRBC # BLD: 0 /100 WBCS — SIGNIFICANT CHANGE UP (ref 0–0)
PLATELET # BLD AUTO: 12 K/UL — CRITICAL LOW (ref 150–400)
RBC # BLD: 2.97 M/UL — LOW (ref 4.2–5.8)
RBC # FLD: 14.6 % — HIGH (ref 10.3–14.5)
WBC # BLD: 1.77 K/UL — LOW (ref 3.8–10.5)
WBC # FLD AUTO: 1.77 K/UL — LOW (ref 3.8–10.5)

## 2022-02-24 PROCEDURE — 99214 OFFICE O/P EST MOD 30 MIN: CPT

## 2022-02-24 PROCEDURE — 86850 RBC ANTIBODY SCREEN: CPT

## 2022-02-24 PROCEDURE — 86901 BLOOD TYPING SEROLOGIC RH(D): CPT

## 2022-02-24 PROCEDURE — 86923 COMPATIBILITY TEST ELECTRIC: CPT

## 2022-02-24 PROCEDURE — 86900 BLOOD TYPING SEROLOGIC ABO: CPT

## 2022-02-24 NOTE — ASSESSMENT
[FreeTextEntry1] : 35 y/o M presented April 2020 with aplastic anemia (very severe AA) confirmed on bone marrow biopsy at [facility] and now s/p inpatient treatment beginning 4/23/2020 with horse ATG + CsA + promacta with complete remission. Bone marrow bx on 11/9/20 revealed hypocellular marrow, overall cellularity improved (20-50%) showing marrow regeneration. BMBx on 5/18/2021 showing normal morphology with normal cellularity. Patient with worsening cytopenias in November 2021 and with neutropenic fever as well, sent inpatient, s/p treatment with rabbit ATG, prednisone, cyclosporine and Promacta, here for follow up. \par \par -Started Rabbit ATG on 12/27- approaching two months of therapy now. \par -Patient remains pancytopenic and transfusion dependent. Severe neutropenia, however now sustained over 500 so offered encouragement to the patient today in the office. With AA especially when relapsed it can take a few months to respond. He is now demonstrating at least a partial response at this point. \par -Severe neutropenia - continue with prophylaxis with levaquin, diflucan, and acyclovir for now. If ANC continuing to uptrend past 750 should be able to drop fluconazole. \par -Jaundiced and labs from 1/24 showing increase in bilirubin to 2.5 and elevated ALT to 297. Likely Promacta related. Patient discharged on 300 mg daily Promacta, had been taking 150 mg inpatient. Was decreased back to 150 mg daily CMP continues to show improvement with T. Bili and ALT.\par -Continue possible platelets TIW as well as PRBCs as needed. \par -Patient no longer with low grade temperatures but still neutropenic.  Informed patient if he has any fevers he needs to immediately present to [facility] ED. He understands and agrees. \par -Continuing cyclosporine, was decreased on 1/28 to 400 mg BID, rechecked at true trough level and then became 550. He was instructed to reduce dose to 350mg BID at that point. Rechecked morning of 2/10 and decreased dose again to 300mg. Will repeat CSA level again today. \par -Monitoring Cr closely as well to monitor for toxicity due to cyclosporine, checking today. \par -Abdominal pain / nausea likely related to gastritis - type symptoms as side effect to therapy, as this occurred with his initial course as well. Sucralfate helped him then, recommended him to take 3 times daily. \par -Once again discussed with patient the indication for allogeneic transplant. He prefers to wait and see if the immunosuppression works again, but is ready to pursue transplant if this fails. \par -Will plan for bone marrow biopsy in one month to evaluate for development of alternate bone marrow pathologies if counts not improving further. \par -Patient understands and agrees with plan. All information explained to the best of my ability.\par -RTC in 2 weeks.

## 2022-02-24 NOTE — REVIEW OF SYSTEMS
[Nosebleeds] : nosebleeds [Abdominal Pain] : abdominal pain [Negative] : Heme/Lymph [Vision Problems] : no vision problems [Dysphagia] : no dysphagia [Odynophagia] : no odynophagia [Chest Pain] : no chest pain [Palpitations] : no palpitations [Lower Ext Edema] : no lower extremity edema [Vomiting] : no vomiting [Constipation] : no constipation [Diarrhea] : no diarrhea [Muscle Weakness] : no muscle weakness [Insomnia] : no insomnia [Anxiety] : no anxiety [Hot Flashes] : no hot flashes [FreeTextEntry4] : occasional when platelets are low and limited in amount [FreeTextEntry7] : nausea

## 2022-02-24 NOTE — HISTORY OF PRESENT ILLNESS
[Disease:__________________________] : Disease: [unfilled] [Therapy: ___] : Therapy: [unfilled] [de-identified] : 37 y/o M with no PMH presented to OSH originally for c/o dizziness and feeling unwell and was then transferred to [facility] for further hematologic evaluation as patient was found to be severely pancytopenic. Patient first started noticing scattered bruising throughout his body (mostly thighs and arms) about 6 weeks prior to presentation, and also had epistaxis. Pt had also noticed intermittent fevers and chills, responsive to tylenol, and noticed very dark stools for the same time period. Also with spontaneous gum bleeding and endorsed severe fatigue and SOB with exertion. Also noted 15 lbs weight loss, partially intentional over past 2-3 months prior to presentation.  Also c/o mild headache located in posterior aspect of his head, denies sore throat, cough, +nausea but no vomiting, no abdominal pain, no diarrhea, no BRBPR, no urinary symptoms, no LE swelling, +visual changes.     \par \par Patient was transferred to [facility] from [facility] on 4/18. Patient was started on IVF, Levaquin, and allopurinol. Patient transfused blood and platelets. Bone Marrow biopsy performed on 4/20 consistent with Aplastic Anemia. 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 Because of this, patient's platelet transfusion threshold was made 50k. Horse ATG test dose was applied 4/22 with no reaction. Treatment started 4/23 consisting of equine ATG at 40mg/kg/day x 4 days, Cyclosporine 10mg/kg/day divided doses BID, Eltrombopag 150mg daily. Patient had episode of hives with ATG infusion on Day 1 thus steroid regimen was changed from oral prednisone to IV Solumedrol Q8 for the duration of ATG treatment and transitioned back to oral prednisone 1mg/kg daily for days 5-10 then tapered. The patient had grade 3 transaminitis likely from Promacta and ATG and Promacta was held as of 5/1. While patient was in the hospital, his course was also complicated by epigastric pain consistent with dyspepsia, most likely due to cyclosporine. Was managed on pepcid BID and sucralfate q6 hours and mylanta as needed with good relief. \par \par On 5/12 the patient's transaminitis had improved(grade 2 total bili, grade 1 AST, grade 2 ALT) and Promacta 150 mg po QD was resumed. Pt's cyclosporine dose was adjusted  according to the level (goal 200-400) and he was on 400 mg po q12h as of 5/18. On 5/16, patient developed chest discomfort during platelet transfusion which improved with benadryl. EKG and CXR were wnl. Patient originally refused benadryl pre-medication prior to platelets that day due to side effects with taking Benadryl, and it was decided to premedicate pt with Claritin or Zyrtec pre blood transfusion instead. On 5/20, cyclosporine level was 460, therefore dose was lowered to 300 mg PO BID. Patient's vision had improved, so platelet transfusion threshold liberalized to 20k. \par \par Patient has since had a complete recovery of blood counts. He discontinued Promacta after 6 months of therapy. He has been monitored since then wit stable CBC and therapeutic cyclosporine levels. \par \par He has since had repeat bone marrow biopsy on 5/18/2021 with normal morphology and cellularity\par \par In September 2021 noted his platelets dropping, and ultimately slowly was becoming pancytopenia. He was restarted on cyclosporine at home, but ultimately was admitted in late December with neutropenic fever. Bone marrow biopsy was repeated on 12/2021 and showed decreased megakaryocytes and other findings consistent with relapse aplastic anemia. Patient was transferred to [address] [place] [address] on 12/23 to start treatment for relapse with rabbit ATG, cyclosporine, prednisone, and eltrombopag. On 12/27 rabbit ATG test dose was given with no adverse reaction. Cyclosporine was started evening of 12/26. Rabbit ATG started 12/29, patient with reaction upon increase in flow rate with fevers, rigors. Patient re-challenged 12/30, again had reaction after completion of Day 1 dose. ATG was subsequently held, but then restarted on 1/3 and completed remaining 5 day course on 1/6. Cyclosporine levels were checked biweekly, dose adjusted as needed based on level. Ultimately was discharged on 450 mg BID cyclosporine. Patient was seen by ENT for sinusitis and treated with Unasyn thru 1/19. Patient remained neutropenic and started ppx w/ levaquin on 1/20. [de-identified] : - Markedly hypocellular bone marrow (mostly 5% and focal 10-\par 15%) with focal minimal erythropoiesis, minimal to absent\par myelopoiesis, absent megakaryocytes, increased iron stores. Aplastic bone marrow.  [de-identified] : Cytogenetics: \par \par Result: Male karyotype\par Karyotype: 46,XY                   {12                   } [de-identified] : very severe Aplastic Anemia [FreeTextEntry1] : Therapy initiated 4/23/2020 [de-identified] : Patient with persistent nausea, but vomiting stopped. Still transfusion dependent. Recently found with supratherapeutic cyclosporine level, came down on the dose checking today. Did reports pain in the left side of his stomach which coincides with when his nausea occurs. Appetite is normal.

## 2022-02-24 NOTE — PHYSICAL EXAM
[Normal] : affect appropriate [Fully active, able to carry on all pre-disease performance without restriction] : Status 0 - Fully active, able to carry on all pre-disease performance without restriction [de-identified] : jaundice appearance imporving [de-identified] : EOMI, scleral icterus improving [de-identified] : supple [de-identified] : (+) S1S2 RRR [de-identified] : no edema (+)pp [de-identified] : no tenderness [de-identified] : ROM intact [de-identified] : A&Ox3 [de-identified] : warm/dry

## 2022-02-25 DIAGNOSIS — R11.2 NAUSEA WITH VOMITING, UNSPECIFIED: ICD-10-CM

## 2022-02-25 DIAGNOSIS — Z51.89 ENCOUNTER FOR OTHER SPECIFIED AFTERCARE: ICD-10-CM

## 2022-02-26 ENCOUNTER — RESULT REVIEW (OUTPATIENT)
Age: 37
End: 2022-02-26

## 2022-02-26 ENCOUNTER — APPOINTMENT (OUTPATIENT)
Dept: INFUSION THERAPY | Facility: HOSPITAL | Age: 37
End: 2022-02-26

## 2022-02-26 LAB
BASOPHILS # BLD AUTO: 0.02 K/UL — SIGNIFICANT CHANGE UP (ref 0–0.2)
BASOPHILS NFR BLD AUTO: 0.7 % — SIGNIFICANT CHANGE UP (ref 0–2)
EOSINOPHIL # BLD AUTO: 0.01 K/UL — SIGNIFICANT CHANGE UP (ref 0–0.5)
EOSINOPHIL NFR BLD AUTO: 0.4 % — SIGNIFICANT CHANGE UP (ref 0–6)
HCT VFR BLD CALC: 23.7 % — LOW (ref 39–50)
HGB BLD-MCNC: 8.6 G/DL — LOW (ref 13–17)
IMM GRANULOCYTES NFR BLD AUTO: 3 % — HIGH (ref 0–1.5)
LYMPHOCYTES # BLD AUTO: 1.33 K/UL — SIGNIFICANT CHANGE UP (ref 1–3.3)
LYMPHOCYTES # BLD AUTO: 49.3 % — HIGH (ref 13–44)
MCHC RBC-ENTMCNC: 30.7 PG — SIGNIFICANT CHANGE UP (ref 27–34)
MCHC RBC-ENTMCNC: 36.3 G/DL — HIGH (ref 32–36)
MCV RBC AUTO: 84.6 FL — SIGNIFICANT CHANGE UP (ref 80–100)
MONOCYTES # BLD AUTO: 0.54 K/UL — SIGNIFICANT CHANGE UP (ref 0–0.9)
MONOCYTES NFR BLD AUTO: 20 % — HIGH (ref 2–14)
NEUTROPHILS # BLD AUTO: 0.72 K/UL — LOW (ref 1.8–7.4)
NEUTROPHILS NFR BLD AUTO: 26.6 % — LOW (ref 43–77)
NRBC # BLD: 2 /100 WBCS — HIGH (ref 0–0)
PLATELET # BLD AUTO: 28 K/UL — LOW (ref 150–400)
RBC # BLD: 2.8 M/UL — LOW (ref 4.2–5.8)
RBC # FLD: 14.8 % — HIGH (ref 10.3–14.5)
WBC # BLD: 2.7 K/UL — LOW (ref 3.8–10.5)
WBC # FLD AUTO: 2.7 K/UL — LOW (ref 3.8–10.5)

## 2022-03-01 ENCOUNTER — APPOINTMENT (OUTPATIENT)
Dept: INFUSION THERAPY | Facility: HOSPITAL | Age: 37
End: 2022-03-01

## 2022-03-01 ENCOUNTER — RESULT REVIEW (OUTPATIENT)
Age: 37
End: 2022-03-01

## 2022-03-01 ENCOUNTER — OUTPATIENT (OUTPATIENT)
Dept: OUTPATIENT SERVICES | Facility: HOSPITAL | Age: 37
LOS: 1 days | End: 2022-03-01
Payer: COMMERCIAL

## 2022-03-01 DIAGNOSIS — D64.9 ANEMIA, UNSPECIFIED: ICD-10-CM

## 2022-03-01 DIAGNOSIS — Z98.890 OTHER SPECIFIED POSTPROCEDURAL STATES: Chronic | ICD-10-CM

## 2022-03-01 LAB
BASOPHILS # BLD AUTO: 0.01 K/UL — SIGNIFICANT CHANGE UP (ref 0–0.2)
BASOPHILS NFR BLD AUTO: 0.3 % — SIGNIFICANT CHANGE UP (ref 0–2)
EOSINOPHIL # BLD AUTO: 0.01 K/UL — SIGNIFICANT CHANGE UP (ref 0–0.5)
EOSINOPHIL NFR BLD AUTO: 0.3 % — SIGNIFICANT CHANGE UP (ref 0–6)
HCT VFR BLD CALC: 24.4 % — LOW (ref 39–50)
HGB BLD-MCNC: 8.9 G/DL — LOW (ref 13–17)
IMM GRANULOCYTES NFR BLD AUTO: 0.7 % — SIGNIFICANT CHANGE UP (ref 0–1.5)
LYMPHOCYTES # BLD AUTO: 1.67 K/UL — SIGNIFICANT CHANGE UP (ref 1–3.3)
LYMPHOCYTES # BLD AUTO: 55.9 % — HIGH (ref 13–44)
MCHC RBC-ENTMCNC: 30.9 PG — SIGNIFICANT CHANGE UP (ref 27–34)
MCHC RBC-ENTMCNC: 36.5 G/DL — HIGH (ref 32–36)
MCV RBC AUTO: 84.7 FL — SIGNIFICANT CHANGE UP (ref 80–100)
MONOCYTES # BLD AUTO: 0.46 K/UL — SIGNIFICANT CHANGE UP (ref 0–0.9)
MONOCYTES NFR BLD AUTO: 15.4 % — HIGH (ref 2–14)
NEUTROPHILS # BLD AUTO: 0.82 K/UL — LOW (ref 1.8–7.4)
NEUTROPHILS NFR BLD AUTO: 27.4 % — LOW (ref 43–77)
NRBC # BLD: 0 /100 WBCS — SIGNIFICANT CHANGE UP (ref 0–0)
PLATELET # BLD AUTO: 23 K/UL — LOW (ref 150–400)
RBC # BLD: 2.88 M/UL — LOW (ref 4.2–5.8)
RBC # FLD: 16 % — HIGH (ref 10.3–14.5)
WBC # BLD: 2.99 K/UL — LOW (ref 3.8–10.5)
WBC # FLD AUTO: 2.99 K/UL — LOW (ref 3.8–10.5)

## 2022-03-03 ENCOUNTER — APPOINTMENT (OUTPATIENT)
Dept: INFUSION THERAPY | Facility: HOSPITAL | Age: 37
End: 2022-03-03

## 2022-03-03 ENCOUNTER — RESULT REVIEW (OUTPATIENT)
Age: 37
End: 2022-03-03

## 2022-03-03 LAB
BASOPHILS # BLD AUTO: 0.01 K/UL — SIGNIFICANT CHANGE UP (ref 0–0.2)
BASOPHILS NFR BLD AUTO: 0.3 % — SIGNIFICANT CHANGE UP (ref 0–2)
EOSINOPHIL # BLD AUTO: 0.01 K/UL — SIGNIFICANT CHANGE UP (ref 0–0.5)
EOSINOPHIL NFR BLD AUTO: 0.3 % — SIGNIFICANT CHANGE UP (ref 0–6)
HCT VFR BLD CALC: 23.1 % — LOW (ref 39–50)
HGB BLD-MCNC: 8.4 G/DL — LOW (ref 13–17)
IMM GRANULOCYTES NFR BLD AUTO: 0.3 % — SIGNIFICANT CHANGE UP (ref 0–1.5)
LYMPHOCYTES # BLD AUTO: 1.9 K/UL — SIGNIFICANT CHANGE UP (ref 1–3.3)
LYMPHOCYTES # BLD AUTO: 59 % — HIGH (ref 13–44)
MCHC RBC-ENTMCNC: 31 PG — SIGNIFICANT CHANGE UP (ref 27–34)
MCHC RBC-ENTMCNC: 36.4 G/DL — HIGH (ref 32–36)
MCV RBC AUTO: 85.2 FL — SIGNIFICANT CHANGE UP (ref 80–100)
MONOCYTES # BLD AUTO: 0.46 K/UL — SIGNIFICANT CHANGE UP (ref 0–0.9)
MONOCYTES NFR BLD AUTO: 14.3 % — HIGH (ref 2–14)
NEUTROPHILS # BLD AUTO: 0.83 K/UL — LOW (ref 1.8–7.4)
NEUTROPHILS NFR BLD AUTO: 25.8 % — LOW (ref 43–77)
NRBC # BLD: 0 /100 WBCS — SIGNIFICANT CHANGE UP (ref 0–0)
PLATELET # BLD AUTO: 26 K/UL — LOW (ref 150–400)
RBC # BLD: 2.71 M/UL — LOW (ref 4.2–5.8)
RBC # FLD: 16.9 % — HIGH (ref 10.3–14.5)
WBC # BLD: 3.22 K/UL — LOW (ref 3.8–10.5)
WBC # FLD AUTO: 3.22 K/UL — LOW (ref 3.8–10.5)

## 2022-03-05 ENCOUNTER — RESULT REVIEW (OUTPATIENT)
Age: 37
End: 2022-03-05

## 2022-03-05 ENCOUNTER — APPOINTMENT (OUTPATIENT)
Dept: INFUSION THERAPY | Facility: HOSPITAL | Age: 37
End: 2022-03-05

## 2022-03-05 LAB
BASOPHILS # BLD AUTO: 0 K/UL — SIGNIFICANT CHANGE UP (ref 0–0.2)
BASOPHILS NFR BLD AUTO: 0 % — SIGNIFICANT CHANGE UP (ref 0–2)
DACRYOCYTES BLD QL SMEAR: SLIGHT — SIGNIFICANT CHANGE UP
EOSINOPHIL # BLD AUTO: 0 K/UL — SIGNIFICANT CHANGE UP (ref 0–0.5)
EOSINOPHIL NFR BLD AUTO: 0 % — SIGNIFICANT CHANGE UP (ref 0–6)
HCT VFR BLD CALC: 22.3 % — LOW (ref 39–50)
HGB BLD-MCNC: 8.2 G/DL — LOW (ref 13–17)
LYMPHOCYTES # BLD AUTO: 1.26 K/UL — SIGNIFICANT CHANGE UP (ref 1–3.3)
LYMPHOCYTES # BLD AUTO: 48 % — HIGH (ref 13–44)
MCHC RBC-ENTMCNC: 31.8 PG — SIGNIFICANT CHANGE UP (ref 27–34)
MCHC RBC-ENTMCNC: 36.8 G/DL — HIGH (ref 32–36)
MCV RBC AUTO: 86.4 FL — SIGNIFICANT CHANGE UP (ref 80–100)
MONOCYTES # BLD AUTO: 0.29 K/UL — SIGNIFICANT CHANGE UP (ref 0–0.9)
MONOCYTES NFR BLD AUTO: 11 % — SIGNIFICANT CHANGE UP (ref 2–14)
NEUTROPHILS # BLD AUTO: 1.08 K/UL — LOW (ref 1.8–7.4)
NEUTROPHILS NFR BLD AUTO: 41 % — LOW (ref 43–77)
NRBC # BLD: 0 /100 — SIGNIFICANT CHANGE UP (ref 0–0)
NRBC # BLD: SIGNIFICANT CHANGE UP /100 WBCS (ref 0–0)
PLAT MORPH BLD: NORMAL — SIGNIFICANT CHANGE UP
PLATELET # BLD AUTO: 44 K/UL — LOW (ref 150–400)
POIKILOCYTOSIS BLD QL AUTO: SLIGHT — SIGNIFICANT CHANGE UP
RBC # BLD: 2.58 M/UL — LOW (ref 4.2–5.8)
RBC # FLD: 17.4 % — HIGH (ref 10.3–14.5)
RBC BLD AUTO: ABNORMAL
WBC # BLD: 2.63 K/UL — LOW (ref 3.8–10.5)
WBC # FLD AUTO: 2.63 K/UL — LOW (ref 3.8–10.5)

## 2022-03-08 ENCOUNTER — APPOINTMENT (OUTPATIENT)
Dept: INFUSION THERAPY | Facility: HOSPITAL | Age: 37
End: 2022-03-08

## 2022-03-08 ENCOUNTER — RESULT REVIEW (OUTPATIENT)
Age: 37
End: 2022-03-08

## 2022-03-08 ENCOUNTER — LABORATORY RESULT (OUTPATIENT)
Age: 37
End: 2022-03-08

## 2022-03-08 LAB
BASOPHILS # BLD AUTO: 0 K/UL — SIGNIFICANT CHANGE UP (ref 0–0.2)
BASOPHILS NFR BLD AUTO: 0 % — SIGNIFICANT CHANGE UP (ref 0–2)
EOSINOPHIL # BLD AUTO: 0.03 K/UL — SIGNIFICANT CHANGE UP (ref 0–0.5)
EOSINOPHIL NFR BLD AUTO: 1 % — SIGNIFICANT CHANGE UP (ref 0–6)
HCT VFR BLD CALC: 23.3 % — LOW (ref 39–50)
HGB BLD-MCNC: 8.7 G/DL — LOW (ref 13–17)
IMM GRANULOCYTES NFR BLD AUTO: 3.4 % — HIGH (ref 0–1.5)
LYMPHOCYTES # BLD AUTO: 1.31 K/UL — SIGNIFICANT CHANGE UP (ref 1–3.3)
LYMPHOCYTES # BLD AUTO: 44.9 % — HIGH (ref 13–44)
MCHC RBC-ENTMCNC: 32.6 PG — SIGNIFICANT CHANGE UP (ref 27–34)
MCHC RBC-ENTMCNC: 37.3 G/DL — HIGH (ref 32–36)
MCV RBC AUTO: 87.3 FL — SIGNIFICANT CHANGE UP (ref 80–100)
MONOCYTES # BLD AUTO: 0.42 K/UL — SIGNIFICANT CHANGE UP (ref 0–0.9)
MONOCYTES NFR BLD AUTO: 14.4 % — HIGH (ref 2–14)
NEUTROPHILS # BLD AUTO: 1.06 K/UL — LOW (ref 1.8–7.4)
NEUTROPHILS NFR BLD AUTO: 36.3 % — LOW (ref 43–77)
NRBC # BLD: 1 /100 WBCS — HIGH (ref 0–0)
PLATELET # BLD AUTO: 32 K/UL — LOW (ref 150–400)
RBC # BLD: 2.67 M/UL — LOW (ref 4.2–5.8)
RBC # FLD: 19.3 % — HIGH (ref 10.3–14.5)
WBC # BLD: 2.92 K/UL — LOW (ref 3.8–10.5)
WBC # FLD AUTO: 2.92 K/UL — LOW (ref 3.8–10.5)

## 2022-03-10 ENCOUNTER — RESULT REVIEW (OUTPATIENT)
Age: 37
End: 2022-03-10

## 2022-03-10 ENCOUNTER — APPOINTMENT (OUTPATIENT)
Dept: HEMATOLOGY ONCOLOGY | Facility: CLINIC | Age: 37
End: 2022-03-10
Payer: COMMERCIAL

## 2022-03-10 ENCOUNTER — APPOINTMENT (OUTPATIENT)
Dept: INFUSION THERAPY | Facility: HOSPITAL | Age: 37
End: 2022-03-10

## 2022-03-10 VITALS
HEART RATE: 109 BPM | RESPIRATION RATE: 16 BRPM | OXYGEN SATURATION: 97 % | DIASTOLIC BLOOD PRESSURE: 84 MMHG | SYSTOLIC BLOOD PRESSURE: 144 MMHG | WEIGHT: 229.28 LBS | TEMPERATURE: 98.4 F | BODY MASS INDEX: 34.75 KG/M2 | HEIGHT: 68.11 IN

## 2022-03-10 LAB
BASOPHILS # BLD AUTO: 0 K/UL — SIGNIFICANT CHANGE UP (ref 0–0.2)
BASOPHILS NFR BLD AUTO: 0 % — SIGNIFICANT CHANGE UP (ref 0–2)
EOSINOPHIL # BLD AUTO: 0.02 K/UL — SIGNIFICANT CHANGE UP (ref 0–0.5)
EOSINOPHIL NFR BLD AUTO: 0.8 % — SIGNIFICANT CHANGE UP (ref 0–6)
HCT VFR BLD CALC: 22.3 % — LOW (ref 39–50)
HGB BLD-MCNC: 8 G/DL — LOW (ref 13–17)
IMM GRANULOCYTES NFR BLD AUTO: 0.4 % — SIGNIFICANT CHANGE UP (ref 0–1.5)
LYMPHOCYTES # BLD AUTO: 1.23 K/UL — SIGNIFICANT CHANGE UP (ref 1–3.3)
LYMPHOCYTES # BLD AUTO: 48.8 % — HIGH (ref 13–44)
MCHC RBC-ENTMCNC: 32.4 PG — SIGNIFICANT CHANGE UP (ref 27–34)
MCHC RBC-ENTMCNC: 35.9 G/DL — SIGNIFICANT CHANGE UP (ref 32–36)
MCV RBC AUTO: 90.3 FL — SIGNIFICANT CHANGE UP (ref 80–100)
MONOCYTES # BLD AUTO: 0.38 K/UL — SIGNIFICANT CHANGE UP (ref 0–0.9)
MONOCYTES NFR BLD AUTO: 15.1 % — HIGH (ref 2–14)
NEUTROPHILS # BLD AUTO: 0.88 K/UL — LOW (ref 1.8–7.4)
NEUTROPHILS NFR BLD AUTO: 34.9 % — LOW (ref 43–77)
NRBC # BLD: 0 /100 WBCS — SIGNIFICANT CHANGE UP (ref 0–0)
PLATELET # BLD AUTO: 31 K/UL — LOW (ref 150–400)
RBC # BLD: 2.47 M/UL — LOW (ref 4.2–5.8)
RBC # FLD: 19.8 % — HIGH (ref 10.3–14.5)
WBC # BLD: 2.52 K/UL — LOW (ref 3.8–10.5)
WBC # FLD AUTO: 2.52 K/UL — LOW (ref 3.8–10.5)

## 2022-03-10 PROCEDURE — 99213 OFFICE O/P EST LOW 20 MIN: CPT

## 2022-03-12 ENCOUNTER — RESULT REVIEW (OUTPATIENT)
Age: 37
End: 2022-03-12

## 2022-03-12 ENCOUNTER — APPOINTMENT (OUTPATIENT)
Dept: INFUSION THERAPY | Facility: HOSPITAL | Age: 37
End: 2022-03-12

## 2022-03-12 LAB
BASOPHILS # BLD AUTO: 0 K/UL — SIGNIFICANT CHANGE UP (ref 0–0.2)
BASOPHILS NFR BLD AUTO: 0 % — SIGNIFICANT CHANGE UP (ref 0–2)
EOSINOPHIL # BLD AUTO: 0.02 K/UL — SIGNIFICANT CHANGE UP (ref 0–0.5)
EOSINOPHIL NFR BLD AUTO: 1 % — SIGNIFICANT CHANGE UP (ref 0–6)
HCT VFR BLD CALC: 21.4 % — LOW (ref 39–50)
HGB BLD-MCNC: 7.7 G/DL — LOW (ref 13–17)
IMM GRANULOCYTES NFR BLD AUTO: 1 % — SIGNIFICANT CHANGE UP (ref 0–1.5)
LYMPHOCYTES # BLD AUTO: 0.82 K/UL — LOW (ref 1–3.3)
LYMPHOCYTES # BLD AUTO: 41.4 % — SIGNIFICANT CHANGE UP (ref 13–44)
MCHC RBC-ENTMCNC: 32.5 PG — SIGNIFICANT CHANGE UP (ref 27–34)
MCHC RBC-ENTMCNC: 36 G/DL — SIGNIFICANT CHANGE UP (ref 32–36)
MCV RBC AUTO: 90.3 FL — SIGNIFICANT CHANGE UP (ref 80–100)
MONOCYTES # BLD AUTO: 0.34 K/UL — SIGNIFICANT CHANGE UP (ref 0–0.9)
MONOCYTES NFR BLD AUTO: 17.2 % — HIGH (ref 2–14)
NEUTROPHILS # BLD AUTO: 0.78 K/UL — LOW (ref 1.8–7.4)
NEUTROPHILS NFR BLD AUTO: 39.4 % — LOW (ref 43–77)
NRBC # BLD: 0 /100 WBCS — SIGNIFICANT CHANGE UP (ref 0–0)
PLATELET # BLD AUTO: 29 K/UL — LOW (ref 150–400)
RBC # BLD: 2.37 M/UL — LOW (ref 4.2–5.8)
RBC # FLD: 20.1 % — HIGH (ref 10.3–14.5)
WBC # BLD: 1.98 K/UL — LOW (ref 3.8–10.5)
WBC # FLD AUTO: 1.98 K/UL — LOW (ref 3.8–10.5)

## 2022-03-15 ENCOUNTER — RESULT REVIEW (OUTPATIENT)
Age: 37
End: 2022-03-15

## 2022-03-15 ENCOUNTER — APPOINTMENT (OUTPATIENT)
Dept: INFUSION THERAPY | Facility: HOSPITAL | Age: 37
End: 2022-03-15

## 2022-03-15 LAB
BASOPHILS # BLD AUTO: 0 K/UL — SIGNIFICANT CHANGE UP (ref 0–0.2)
BASOPHILS NFR BLD AUTO: 0 % — SIGNIFICANT CHANGE UP (ref 0–2)
EOSINOPHIL # BLD AUTO: 0.02 K/UL — SIGNIFICANT CHANGE UP (ref 0–0.5)
EOSINOPHIL NFR BLD AUTO: 0.7 % — SIGNIFICANT CHANGE UP (ref 0–6)
HCT VFR BLD CALC: 21.6 % — LOW (ref 39–50)
HGB BLD-MCNC: 8 G/DL — LOW (ref 13–17)
IMM GRANULOCYTES NFR BLD AUTO: 0.7 % — SIGNIFICANT CHANGE UP (ref 0–1.5)
LYMPHOCYTES # BLD AUTO: 1.33 K/UL — SIGNIFICANT CHANGE UP (ref 1–3.3)
LYMPHOCYTES # BLD AUTO: 49.3 % — HIGH (ref 13–44)
MCHC RBC-ENTMCNC: 33.1 PG — SIGNIFICANT CHANGE UP (ref 27–34)
MCHC RBC-ENTMCNC: 37 G/DL — HIGH (ref 32–36)
MCV RBC AUTO: 89.3 FL — SIGNIFICANT CHANGE UP (ref 80–100)
MONOCYTES # BLD AUTO: 0.28 K/UL — SIGNIFICANT CHANGE UP (ref 0–0.9)
MONOCYTES NFR BLD AUTO: 10.4 % — SIGNIFICANT CHANGE UP (ref 2–14)
NEUTROPHILS # BLD AUTO: 1.05 K/UL — LOW (ref 1.8–7.4)
NEUTROPHILS NFR BLD AUTO: 38.9 % — LOW (ref 43–77)
NRBC # BLD: 0 /100 WBCS — SIGNIFICANT CHANGE UP (ref 0–0)
PLATELET # BLD AUTO: 35 K/UL — LOW (ref 150–400)
RBC # BLD: 2.42 M/UL — LOW (ref 4.2–5.8)
RBC # FLD: 21.8 % — HIGH (ref 10.3–14.5)
WBC # BLD: 2.7 K/UL — LOW (ref 3.8–10.5)
WBC # FLD AUTO: 2.7 K/UL — LOW (ref 3.8–10.5)

## 2022-03-17 ENCOUNTER — APPOINTMENT (OUTPATIENT)
Dept: INFUSION THERAPY | Facility: HOSPITAL | Age: 37
End: 2022-03-17

## 2022-03-19 ENCOUNTER — RESULT REVIEW (OUTPATIENT)
Age: 37
End: 2022-03-19

## 2022-03-19 ENCOUNTER — APPOINTMENT (OUTPATIENT)
Dept: INFUSION THERAPY | Facility: HOSPITAL | Age: 37
End: 2022-03-19

## 2022-03-19 LAB
BASOPHILS # BLD AUTO: 0.01 K/UL — SIGNIFICANT CHANGE UP (ref 0–0.2)
BASOPHILS NFR BLD AUTO: 0.3 % — SIGNIFICANT CHANGE UP (ref 0–2)
EOSINOPHIL # BLD AUTO: 0.03 K/UL — SIGNIFICANT CHANGE UP (ref 0–0.5)
EOSINOPHIL NFR BLD AUTO: 1 % — SIGNIFICANT CHANGE UP (ref 0–6)
HCT VFR BLD CALC: 26.1 % — LOW (ref 39–50)
HGB BLD-MCNC: 9.1 G/DL — LOW (ref 13–17)
IMM GRANULOCYTES NFR BLD AUTO: 0.7 % — SIGNIFICANT CHANGE UP (ref 0–1.5)
LYMPHOCYTES # BLD AUTO: 1.19 K/UL — SIGNIFICANT CHANGE UP (ref 1–3.3)
LYMPHOCYTES # BLD AUTO: 41 % — SIGNIFICANT CHANGE UP (ref 13–44)
MCHC RBC-ENTMCNC: 32.2 PG — SIGNIFICANT CHANGE UP (ref 27–34)
MCHC RBC-ENTMCNC: 34.9 G/DL — SIGNIFICANT CHANGE UP (ref 32–36)
MCV RBC AUTO: 92.2 FL — SIGNIFICANT CHANGE UP (ref 80–100)
MONOCYTES # BLD AUTO: 0.5 K/UL — SIGNIFICANT CHANGE UP (ref 0–0.9)
MONOCYTES NFR BLD AUTO: 17.2 % — HIGH (ref 2–14)
NEUTROPHILS # BLD AUTO: 1.15 K/UL — LOW (ref 1.8–7.4)
NEUTROPHILS NFR BLD AUTO: 39.8 % — LOW (ref 43–77)
NRBC # BLD: 0 /100 WBCS — SIGNIFICANT CHANGE UP (ref 0–0)
PLATELET # BLD AUTO: 36 K/UL — LOW (ref 150–400)
RBC # BLD: 2.83 M/UL — LOW (ref 4.2–5.8)
RBC # FLD: 21.2 % — HIGH (ref 10.3–14.5)
WBC # BLD: 2.9 K/UL — LOW (ref 3.8–10.5)
WBC # FLD AUTO: 2.9 K/UL — LOW (ref 3.8–10.5)

## 2022-03-22 ENCOUNTER — APPOINTMENT (OUTPATIENT)
Dept: INFUSION THERAPY | Facility: HOSPITAL | Age: 37
End: 2022-03-22

## 2022-03-22 ENCOUNTER — RESULT REVIEW (OUTPATIENT)
Age: 37
End: 2022-03-22

## 2022-03-22 ENCOUNTER — OUTPATIENT (OUTPATIENT)
Dept: OUTPATIENT SERVICES | Facility: HOSPITAL | Age: 37
LOS: 1 days | Discharge: HOME | End: 2022-03-22

## 2022-03-22 DIAGNOSIS — Z98.890 OTHER SPECIFIED POSTPROCEDURAL STATES: Chronic | ICD-10-CM

## 2022-03-22 DIAGNOSIS — D64.9 ANEMIA, UNSPECIFIED: ICD-10-CM

## 2022-03-22 LAB
BASOPHILS # BLD AUTO: 0 K/UL — SIGNIFICANT CHANGE UP (ref 0–0.2)
BASOPHILS NFR BLD AUTO: 0 % — SIGNIFICANT CHANGE UP (ref 0–2)
EOSINOPHIL # BLD AUTO: 0.06 K/UL — SIGNIFICANT CHANGE UP (ref 0–0.5)
EOSINOPHIL NFR BLD AUTO: 2 % — SIGNIFICANT CHANGE UP (ref 0–6)
HCT VFR BLD CALC: 23.9 % — LOW (ref 39–50)
HGB BLD-MCNC: 8.8 G/DL — LOW (ref 13–17)
LYMPHOCYTES # BLD AUTO: 1.19 K/UL — SIGNIFICANT CHANGE UP (ref 1–3.3)
LYMPHOCYTES # BLD AUTO: 42 % — SIGNIFICANT CHANGE UP (ref 13–44)
MCHC RBC-ENTMCNC: 33.7 PG — SIGNIFICANT CHANGE UP (ref 27–34)
MCHC RBC-ENTMCNC: 36.8 G/DL — HIGH (ref 32–36)
MCV RBC AUTO: 91.6 FL — SIGNIFICANT CHANGE UP (ref 80–100)
MONOCYTES # BLD AUTO: 0.48 K/UL — SIGNIFICANT CHANGE UP (ref 0–0.9)
MONOCYTES NFR BLD AUTO: 17 % — HIGH (ref 2–14)
NEUTROPHILS # BLD AUTO: 1.11 K/UL — LOW (ref 1.8–7.4)
NEUTROPHILS NFR BLD AUTO: 39 % — LOW (ref 43–77)
NRBC # BLD: 0 /100 — SIGNIFICANT CHANGE UP (ref 0–0)
NRBC # BLD: SIGNIFICANT CHANGE UP /100 WBCS (ref 0–0)
PLAT MORPH BLD: NORMAL — SIGNIFICANT CHANGE UP
PLATELET # BLD AUTO: 28 K/UL — LOW (ref 150–400)
RBC # BLD: 2.61 M/UL — LOW (ref 4.2–5.8)
RBC # FLD: 21.4 % — HIGH (ref 10.3–14.5)
RBC BLD AUTO: SIGNIFICANT CHANGE UP
WBC # BLD: 2.84 K/UL — LOW (ref 3.8–10.5)
WBC # FLD AUTO: 2.84 K/UL — LOW (ref 3.8–10.5)

## 2022-03-23 DIAGNOSIS — R11.2 NAUSEA WITH VOMITING, UNSPECIFIED: ICD-10-CM

## 2022-03-23 DIAGNOSIS — Z51.89 ENCOUNTER FOR OTHER SPECIFIED AFTERCARE: ICD-10-CM

## 2022-03-23 LAB
ALBUMIN SERPL ELPH-MCNC: 4.7 G/DL
ALBUMIN SERPL ELPH-MCNC: 4.9 G/DL
ALBUMIN SERPL ELPH-MCNC: 4.9 G/DL
ALP BLD-CCNC: 106 U/L
ALP BLD-CCNC: 108 U/L
ALP BLD-CCNC: 99 U/L
ALT SERPL-CCNC: 33 U/L
ALT SERPL-CCNC: 35 U/L
ALT SERPL-CCNC: 38 U/L
AMYLASE/CREAT SERPL: 56 U/L
ANION GAP SERPL CALC-SCNC: 13 MMOL/L
ANION GAP SERPL CALC-SCNC: 14 MMOL/L
ANION GAP SERPL CALC-SCNC: 15 MMOL/L
AST SERPL-CCNC: 30 U/L
AST SERPL-CCNC: 32 U/L
AST SERPL-CCNC: 33 U/L
BILIRUB INDIRECT SERPL-MCNC: 0.8 MG/DL
BILIRUB SERPL-MCNC: 1 MG/DL
BILIRUB SERPL-MCNC: 1 MG/DL
BILIRUB SERPL-MCNC: 1.2 MG/DL
BUN SERPL-MCNC: 27 MG/DL
BUN SERPL-MCNC: 32 MG/DL
BUN SERPL-MCNC: 40 MG/DL
CALCIUM SERPL-MCNC: 10.1 MG/DL
CALCIUM SERPL-MCNC: 9.7 MG/DL
CALCIUM SERPL-MCNC: 9.8 MG/DL
CHLORIDE SERPL-SCNC: 103 MMOL/L
CHLORIDE SERPL-SCNC: 103 MMOL/L
CHLORIDE SERPL-SCNC: 99 MMOL/L
CO2 SERPL-SCNC: 22 MMOL/L
CO2 SERPL-SCNC: 23 MMOL/L
CO2 SERPL-SCNC: 23 MMOL/L
CREAT SERPL-MCNC: 1.17 MG/DL
CREAT SERPL-MCNC: 1.47 MG/DL
CREAT SERPL-MCNC: 1.77 MG/DL
CYCLOSPORINE SER-MCNC: 210 NG/ML
CYCLOSPORINE SER-MCNC: 238 NG/ML
CYCLOSPORINE SER-MCNC: 264 NG/ML
EGFR: 51 ML/MIN/1.73M2
EGFR: 64 ML/MIN/1.73M2
GLUCOSE SERPL-MCNC: 111 MG/DL
GLUCOSE SERPL-MCNC: 130 MG/DL
GLUCOSE SERPL-MCNC: 131 MG/DL
LDH SERPL-CCNC: 429 U/L
LDH SERPL-CCNC: 506 U/L
LDH SERPL-CCNC: 530 U/L
LPL SERPL-CCNC: 28 U/L
POTASSIUM SERPL-SCNC: 4.1 MMOL/L
POTASSIUM SERPL-SCNC: 4.3 MMOL/L
POTASSIUM SERPL-SCNC: 4.5 MMOL/L
PROT SERPL-MCNC: 8.2 G/DL
PROT SERPL-MCNC: 8.4 G/DL
PROT SERPL-MCNC: 8.4 G/DL
SODIUM SERPL-SCNC: 135 MMOL/L
SODIUM SERPL-SCNC: 139 MMOL/L
SODIUM SERPL-SCNC: 140 MMOL/L

## 2022-03-24 ENCOUNTER — RESULT REVIEW (OUTPATIENT)
Age: 37
End: 2022-03-24

## 2022-03-24 ENCOUNTER — APPOINTMENT (OUTPATIENT)
Dept: INFUSION THERAPY | Facility: HOSPITAL | Age: 37
End: 2022-03-24

## 2022-03-24 ENCOUNTER — APPOINTMENT (OUTPATIENT)
Dept: HEMATOLOGY ONCOLOGY | Facility: CLINIC | Age: 37
End: 2022-03-24
Payer: COMMERCIAL

## 2022-03-24 VITALS
RESPIRATION RATE: 16 BRPM | OXYGEN SATURATION: 99 % | TEMPERATURE: 97.8 F | BODY MASS INDEX: 35.05 KG/M2 | DIASTOLIC BLOOD PRESSURE: 88 MMHG | HEART RATE: 111 BPM | WEIGHT: 231.26 LBS | HEIGHT: 68.11 IN | SYSTOLIC BLOOD PRESSURE: 139 MMHG

## 2022-03-24 LAB
BASOPHILS # BLD AUTO: 0 K/UL — SIGNIFICANT CHANGE UP (ref 0–0.2)
BASOPHILS NFR BLD AUTO: 0 % — SIGNIFICANT CHANGE UP (ref 0–2)
EOSINOPHIL # BLD AUTO: 0.03 K/UL — SIGNIFICANT CHANGE UP (ref 0–0.5)
EOSINOPHIL NFR BLD AUTO: 1.4 % — SIGNIFICANT CHANGE UP (ref 0–6)
HCT VFR BLD CALC: 23.5 % — LOW (ref 39–50)
HGB BLD-MCNC: 8.8 G/DL — LOW (ref 13–17)
IMM GRANULOCYTES NFR BLD AUTO: 1 % — SIGNIFICANT CHANGE UP (ref 0–1.5)
LYMPHOCYTES # BLD AUTO: 0.84 K/UL — LOW (ref 1–3.3)
LYMPHOCYTES # BLD AUTO: 40.4 % — SIGNIFICANT CHANGE UP (ref 13–44)
MCHC RBC-ENTMCNC: 34.5 PG — HIGH (ref 27–34)
MCHC RBC-ENTMCNC: 37.4 G/DL — HIGH (ref 32–36)
MCV RBC AUTO: 92.2 FL — SIGNIFICANT CHANGE UP (ref 80–100)
MONOCYTES # BLD AUTO: 0.24 K/UL — SIGNIFICANT CHANGE UP (ref 0–0.9)
MONOCYTES NFR BLD AUTO: 11.5 % — SIGNIFICANT CHANGE UP (ref 2–14)
NEUTROPHILS # BLD AUTO: 0.95 K/UL — LOW (ref 1.8–7.4)
NEUTROPHILS NFR BLD AUTO: 45.7 % — SIGNIFICANT CHANGE UP (ref 43–77)
NRBC # BLD: 0 /100 WBCS — SIGNIFICANT CHANGE UP (ref 0–0)
PLATELET # BLD AUTO: 61 K/UL — LOW (ref 150–400)
RBC # BLD: 2.55 M/UL — LOW (ref 4.2–5.8)
RBC # FLD: 21.4 % — HIGH (ref 10.3–14.5)
WBC # BLD: 2.08 K/UL — LOW (ref 3.8–10.5)
WBC # FLD AUTO: 2.08 K/UL — LOW (ref 3.8–10.5)

## 2022-03-24 PROCEDURE — 86900 BLOOD TYPING SEROLOGIC ABO: CPT

## 2022-03-24 PROCEDURE — 99214 OFFICE O/P EST MOD 30 MIN: CPT

## 2022-03-24 PROCEDURE — 86850 RBC ANTIBODY SCREEN: CPT

## 2022-03-24 PROCEDURE — 86923 COMPATIBILITY TEST ELECTRIC: CPT

## 2022-03-24 PROCEDURE — 86901 BLOOD TYPING SEROLOGIC RH(D): CPT

## 2022-03-24 RX ORDER — CYCLOSPORINE 100 MG/1
100 CAPSULE, LIQUID FILLED ORAL
Qty: 180 | Refills: 0 | Status: DISCONTINUED | COMMUNITY
Start: 2022-01-27 | End: 2022-03-24

## 2022-03-24 RX ORDER — LEVOFLOXACIN 500 MG/1
500 TABLET, FILM COATED ORAL
Qty: 30 | Refills: 0 | Status: DISCONTINUED | COMMUNITY
Start: 2021-11-30 | End: 2022-03-24

## 2022-03-24 RX ORDER — FLUCONAZOLE 200 MG/1
200 TABLET ORAL DAILY
Qty: 30 | Refills: 3 | Status: DISCONTINUED | COMMUNITY
Start: 2021-11-30 | End: 2022-03-24

## 2022-03-24 RX ORDER — METOCLOPRAMIDE 10 MG/1
10 TABLET ORAL EVERY 6 HOURS
Qty: 40 | Refills: 3 | Status: DISCONTINUED | COMMUNITY
Start: 2020-06-08 | End: 2022-03-24

## 2022-03-25 LAB
ALBUMIN SERPL ELPH-MCNC: 5 G/DL
ALP BLD-CCNC: 97 U/L
ALT SERPL-CCNC: 36 U/L
ANION GAP SERPL CALC-SCNC: 13 MMOL/L
AST SERPL-CCNC: 32 U/L
BILIRUB SERPL-MCNC: 1.1 MG/DL
BUN SERPL-MCNC: 26 MG/DL
CALCIUM SERPL-MCNC: 9.8 MG/DL
CHLORIDE SERPL-SCNC: 102 MMOL/L
CO2 SERPL-SCNC: 23 MMOL/L
CREAT SERPL-MCNC: 1.38 MG/DL
CYCLOSPORINE SER-MCNC: 346 NG/ML
EGFR: 69 ML/MIN/1.73M2
GLUCOSE SERPL-MCNC: 116 MG/DL
LDH SERPL-CCNC: 560 U/L
POTASSIUM SERPL-SCNC: 4.1 MMOL/L
PROT SERPL-MCNC: 8 G/DL
SODIUM SERPL-SCNC: 138 MMOL/L

## 2022-03-25 NOTE — REVIEW OF SYSTEMS
[Abdominal Pain] : abdominal pain [Negative] : Heme/Lymph [Vision Problems] : no vision problems [Dysphagia] : no dysphagia [Nosebleeds] : no nosebleeds [Odynophagia] : no odynophagia [Chest Pain] : no chest pain [Palpitations] : no palpitations [Lower Ext Edema] : no lower extremity edema [Vomiting] : no vomiting [Constipation] : no constipation [Diarrhea] : no diarrhea [Muscle Weakness] : no muscle weakness [Insomnia] : no insomnia [Anxiety] : no anxiety [Hot Flashes] : no hot flashes [FreeTextEntry7] : nausea

## 2022-03-25 NOTE — ASSESSMENT
[FreeTextEntry1] : 37 y/o M presented April 2020 with aplastic anemia (very severe AA) confirmed on bone marrow biopsy at [facility] and now s/p inpatient treatment beginning 4/23/2020 with horse ATG + CsA + promacta with complete remission. Bone marrow bx on 11/9/20 revealed hypocellular marrow, overall cellularity improved (20-50%) showing marrow regeneration. BMBx on 5/18/2021 showing normal morphology with normal cellularity. Patient with worsening cytopenias in November 2021 and with neutropenic fever as well, sent inpatient, s/p treatment with rabbit ATG, prednisone, cyclosporine and Promacta, here for follow up. \par \par -Started Rabbit ATG on 12/27- now s/p 3 months of therapy\par -Patient remains pancytopenic but transfusion dependence is greatly reduced now. Patient no longer with severe neutropenia, now it is mild. Will discontinue antibacterial and antifungal prophylaxis.\par -Jaundiced early 2021 likely Promacta related. Patient discharged on 300 mg daily Promacta, had been taking 150 mg inpatient. Was decreased back to 150 mg daily CMP now showing normal T. Bili and ALT.\par -Patient with recent ANSON (Cr up to 1.77) likely related to recent supratherapeutic cyclosporine levels. WIll repeat today. \par -Will decrease platelet visits to twice a week. \par -Cyclosporine has been therapeutic at 300 mg twice a day. Will switch to liquid formulation, as patient reports this causes less GI irritation for him. \par -Monitoring Cr closely as well to monitor for toxicity due to cyclosporine, checking today. \par -Abdominal pain / nausea likely related to gastritis - type symptoms as side effect to therapy, as this occurred with his initial course as well. Sucralfate helped him then, recommended him to take 3 times daily. \par -Patient understands and agrees with plan. All information explained to the best of my ability.\par -Lab visit in 2 weeks, RTC in 1 month

## 2022-03-25 NOTE — PHYSICAL EXAM
[Fully active, able to carry on all pre-disease performance without restriction] : Status 0 - Fully active, able to carry on all pre-disease performance without restriction [Normal] : affect appropriate [de-identified] : jaundice appearance imporving [de-identified] : EOMI, scleral icterus improving [de-identified] : supple [de-identified] : (+) S1S2 RRR [de-identified] : no edema (+)pp [de-identified] : no tenderness [de-identified] : ROM intact [de-identified] : warm/dry [de-identified] : A&Ox3

## 2022-03-25 NOTE — HISTORY OF PRESENT ILLNESS
[Disease:__________________________] : Disease: [unfilled] [Therapy: ___] : Therapy: [unfilled] [de-identified] : 37 y/o M with no PMH presented to OSH originally for c/o dizziness and feeling unwell and was then transferred to [facility] for further hematologic evaluation as patient was found to be severely pancytopenic. Patient first started noticing scattered bruising throughout his body (mostly thighs and arms) about 6 weeks prior to presentation, and also had epistaxis. Pt had also noticed intermittent fevers and chills, responsive to tylenol, and noticed very dark stools for the same time period. Also with spontaneous gum bleeding and endorsed severe fatigue and SOB with exertion. Also noted 15 lbs weight loss, partially intentional over past 2-3 months prior to presentation.  Also c/o mild headache located in posterior aspect of his head, denies sore throat, cough, +nausea but no vomiting, no abdominal pain, no diarrhea, no BRBPR, no urinary symptoms, no LE swelling, +visual changes.     \par \par Patient was transferred to [facility] from [facility] on 4/18. Patient was started on IVF, Levaquin, and allopurinol. Patient transfused blood and platelets. Bone Marrow biopsy performed on 4/20 consistent with Aplastic Anemia. 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 Because of this, patient's platelet transfusion threshold was made 50k. Horse ATG test dose was applied 4/22 with no reaction. Treatment started 4/23 consisting of equine ATG at 40mg/kg/day x 4 days, Cyclosporine 10mg/kg/day divided doses BID, Eltrombopag 150mg daily. Patient had episode of hives with ATG infusion on Day 1 thus steroid regimen was changed from oral prednisone to IV Solumedrol Q8 for the duration of ATG treatment and transitioned back to oral prednisone 1mg/kg daily for days 5-10 then tapered. The patient had grade 3 transaminitis likely from Promacta and ATG and Promacta was held as of 5/1. While patient was in the hospital, his course was also complicated by epigastric pain consistent with dyspepsia, most likely due to cyclosporine. Was managed on pepcid BID and sucralfate q6 hours and mylanta as needed with good relief. \par \par On 5/12 the patient's transaminitis had improved(grade 2 total bili, grade 1 AST, grade 2 ALT) and Promacta 150 mg po QD was resumed. Pt's cyclosporine dose was adjusted  according to the level (goal 200-400) and he was on 400 mg po q12h as of 5/18. On 5/16, patient developed chest discomfort during platelet transfusion which improved with benadryl. EKG and CXR were wnl. Patient originally refused benadryl pre-medication prior to platelets that day due to side effects with taking Benadryl, and it was decided to premedicate pt with Claritin or Zyrtec pre blood transfusion instead. On 5/20, cyclosporine level was 460, therefore dose was lowered to 300 mg PO BID. Patient's vision had improved, so platelet transfusion threshold liberalized to 20k. \par \par Patient has since had a complete recovery of blood counts. He discontinued Promacta after 6 months of therapy. He has been monitored since then wit stable CBC and therapeutic cyclosporine levels. \par \par He has since had repeat bone marrow biopsy on 5/18/2021 with normal morphology and cellularity\par \par In September 2021 noted his platelets dropping, and ultimately slowly was becoming pancytopenia. He was restarted on cyclosporine at home, but ultimately was admitted in late December with neutropenic fever. Bone marrow biopsy was repeated on 12/2021 and showed decreased megakaryocytes and other findings consistent with relapse aplastic anemia. Patient was transferred to [address] [place] [address] on 12/23 to start treatment for relapse with rabbit ATG, cyclosporine, prednisone, and eltrombopag. On 12/27 rabbit ATG test dose was given with no adverse reaction. Cyclosporine was started evening of 12/26. Rabbit ATG started 12/29, patient with reaction upon increase in flow rate with fevers, rigors. Patient re-challenged 12/30, again had reaction after completion of Day 1 dose. ATG was subsequently held, but then restarted on 1/3 and completed remaining 5 day course on 1/6. Cyclosporine levels were checked biweekly, dose adjusted as needed based on level. Ultimately was discharged on 450 mg BID cyclosporine. Patient was seen by ENT for sinusitis and treated with Unasyn thru 1/19. Patient remained neutropenic and started ppx w/ levaquin on 1/20. [de-identified] : - Markedly hypocellular bone marrow (mostly 5% and focal 10-\par 15%) with focal minimal erythropoiesis, minimal to absent\par myelopoiesis, absent megakaryocytes, increased iron stores. Aplastic bone marrow.  [de-identified] : Cytogenetics: \par \par Result: Male karyotype\par Karyotype: 46,XY                     {12                     } [de-identified] : very severe Aplastic Anemia [FreeTextEntry1] : Therapy initiated 4/23/2020 [de-identified] : Patient presents today for follow up. No further abdominal pain, and nausea is a lot improved. Appetite is fine. This past Tuesday 3/22 his plts were 28 but he did have epistaxis that morning (lasted only a minute, had some dry sinuses and blew his nose hard). Therefore he did receive a unit of platelets but other than that he hasn't had a plt transfusion in weeks. He has also been transfusion independent from PRBCs for a while at this point. He is improving overall.

## 2022-03-26 ENCOUNTER — APPOINTMENT (OUTPATIENT)
Dept: INFUSION THERAPY | Facility: HOSPITAL | Age: 37
End: 2022-03-26

## 2022-03-29 ENCOUNTER — APPOINTMENT (OUTPATIENT)
Dept: INFUSION THERAPY | Facility: HOSPITAL | Age: 37
End: 2022-03-29

## 2022-03-29 ENCOUNTER — RESULT REVIEW (OUTPATIENT)
Age: 37
End: 2022-03-29

## 2022-03-29 LAB
BASOPHILS # BLD AUTO: 0.01 K/UL — SIGNIFICANT CHANGE UP (ref 0–0.2)
BASOPHILS NFR BLD AUTO: 0.3 % — SIGNIFICANT CHANGE UP (ref 0–2)
EOSINOPHIL # BLD AUTO: 0.09 K/UL — SIGNIFICANT CHANGE UP (ref 0–0.5)
EOSINOPHIL NFR BLD AUTO: 2.9 % — SIGNIFICANT CHANGE UP (ref 0–6)
HCT VFR BLD CALC: 24.1 % — LOW (ref 39–50)
HGB BLD-MCNC: 8.7 G/DL — LOW (ref 13–17)
IMM GRANULOCYTES NFR BLD AUTO: 2.5 % — HIGH (ref 0–1.5)
LYMPHOCYTES # BLD AUTO: 1.28 K/UL — SIGNIFICANT CHANGE UP (ref 1–3.3)
LYMPHOCYTES # BLD AUTO: 40.6 % — SIGNIFICANT CHANGE UP (ref 13–44)
MCHC RBC-ENTMCNC: 34.5 PG — HIGH (ref 27–34)
MCHC RBC-ENTMCNC: 36.1 G/DL — HIGH (ref 32–36)
MCV RBC AUTO: 95.6 FL — SIGNIFICANT CHANGE UP (ref 80–100)
MONOCYTES # BLD AUTO: 0.41 K/UL — SIGNIFICANT CHANGE UP (ref 0–0.9)
MONOCYTES NFR BLD AUTO: 13 % — SIGNIFICANT CHANGE UP (ref 2–14)
NEUTROPHILS # BLD AUTO: 1.28 K/UL — LOW (ref 1.8–7.4)
NEUTROPHILS NFR BLD AUTO: 40.7 % — LOW (ref 43–77)
NRBC # BLD: 1 /100 WBCS — HIGH (ref 0–0)
PLATELET # BLD AUTO: 41 K/UL — LOW (ref 150–400)
RBC # BLD: 2.52 M/UL — LOW (ref 4.2–5.8)
RBC # FLD: 22.4 % — HIGH (ref 10.3–14.5)
WBC # BLD: 3.15 K/UL — LOW (ref 3.8–10.5)
WBC # FLD AUTO: 3.15 K/UL — LOW (ref 3.8–10.5)

## 2022-03-31 ENCOUNTER — APPOINTMENT (OUTPATIENT)
Dept: INFUSION THERAPY | Facility: HOSPITAL | Age: 37
End: 2022-03-31

## 2022-03-31 ENCOUNTER — RESULT REVIEW (OUTPATIENT)
Age: 37
End: 2022-03-31

## 2022-03-31 LAB
BASOPHILS # BLD AUTO: 0.01 K/UL — SIGNIFICANT CHANGE UP (ref 0–0.2)
BASOPHILS NFR BLD AUTO: 0.4 % — SIGNIFICANT CHANGE UP (ref 0–2)
EOSINOPHIL # BLD AUTO: 0.02 K/UL — SIGNIFICANT CHANGE UP (ref 0–0.5)
EOSINOPHIL NFR BLD AUTO: 0.8 % — SIGNIFICANT CHANGE UP (ref 0–6)
HCT VFR BLD CALC: 22.6 % — LOW (ref 39–50)
HGB BLD-MCNC: 8.1 G/DL — LOW (ref 13–17)
IMM GRANULOCYTES NFR BLD AUTO: 0.4 % — SIGNIFICANT CHANGE UP (ref 0–1.5)
LYMPHOCYTES # BLD AUTO: 1.05 K/UL — SIGNIFICANT CHANGE UP (ref 1–3.3)
LYMPHOCYTES # BLD AUTO: 43.8 % — SIGNIFICANT CHANGE UP (ref 13–44)
MCHC RBC-ENTMCNC: 33.8 PG — SIGNIFICANT CHANGE UP (ref 27–34)
MCHC RBC-ENTMCNC: 35.8 G/DL — SIGNIFICANT CHANGE UP (ref 32–36)
MCV RBC AUTO: 94.2 FL — SIGNIFICANT CHANGE UP (ref 80–100)
MONOCYTES # BLD AUTO: 0.33 K/UL — SIGNIFICANT CHANGE UP (ref 0–0.9)
MONOCYTES NFR BLD AUTO: 13.8 % — SIGNIFICANT CHANGE UP (ref 2–14)
NEUTROPHILS # BLD AUTO: 0.98 K/UL — LOW (ref 1.8–7.4)
NEUTROPHILS NFR BLD AUTO: 40.8 % — LOW (ref 43–77)
NRBC # BLD: 0 /100 WBCS — SIGNIFICANT CHANGE UP (ref 0–0)
PLATELET # BLD AUTO: 40 K/UL — LOW (ref 150–400)
RBC # BLD: 2.4 M/UL — LOW (ref 4.2–5.8)
RBC # FLD: 21.7 % — HIGH (ref 10.3–14.5)
WBC # BLD: 2.4 K/UL — LOW (ref 3.8–10.5)
WBC # FLD AUTO: 2.4 K/UL — LOW (ref 3.8–10.5)

## 2022-04-01 ENCOUNTER — OUTPATIENT (OUTPATIENT)
Dept: OUTPATIENT SERVICES | Facility: HOSPITAL | Age: 37
LOS: 1 days | End: 2022-04-01
Payer: COMMERCIAL

## 2022-04-01 DIAGNOSIS — Z98.890 OTHER SPECIFIED POSTPROCEDURAL STATES: Chronic | ICD-10-CM

## 2022-04-01 DIAGNOSIS — D64.9 ANEMIA, UNSPECIFIED: ICD-10-CM

## 2022-04-02 ENCOUNTER — APPOINTMENT (OUTPATIENT)
Dept: INFUSION THERAPY | Facility: HOSPITAL | Age: 37
End: 2022-04-02

## 2022-04-02 ENCOUNTER — RESULT REVIEW (OUTPATIENT)
Age: 37
End: 2022-04-02

## 2022-04-02 LAB
BASOPHILS # BLD AUTO: 0.01 K/UL — SIGNIFICANT CHANGE UP (ref 0–0.2)
BASOPHILS NFR BLD AUTO: 0.3 % — SIGNIFICANT CHANGE UP (ref 0–2)
EOSINOPHIL # BLD AUTO: 0.06 K/UL — SIGNIFICANT CHANGE UP (ref 0–0.5)
EOSINOPHIL NFR BLD AUTO: 2 % — SIGNIFICANT CHANGE UP (ref 0–6)
HCT VFR BLD CALC: 21.6 % — LOW (ref 39–50)
HGB BLD-MCNC: 8 G/DL — LOW (ref 13–17)
IMM GRANULOCYTES NFR BLD AUTO: 1.7 % — HIGH (ref 0–1.5)
LYMPHOCYTES # BLD AUTO: 1.26 K/UL — SIGNIFICANT CHANGE UP (ref 1–3.3)
LYMPHOCYTES # BLD AUTO: 42.9 % — SIGNIFICANT CHANGE UP (ref 13–44)
MCHC RBC-ENTMCNC: 35.1 PG — HIGH (ref 27–34)
MCHC RBC-ENTMCNC: 37 G/DL — HIGH (ref 32–36)
MCV RBC AUTO: 94.7 FL — SIGNIFICANT CHANGE UP (ref 80–100)
MONOCYTES # BLD AUTO: 0.42 K/UL — SIGNIFICANT CHANGE UP (ref 0–0.9)
MONOCYTES NFR BLD AUTO: 14.3 % — HIGH (ref 2–14)
NEUTROPHILS # BLD AUTO: 1.14 K/UL — LOW (ref 1.8–7.4)
NEUTROPHILS NFR BLD AUTO: 38.8 % — LOW (ref 43–77)
NRBC # BLD: 0 /100 WBCS — SIGNIFICANT CHANGE UP (ref 0–0)
PLATELET # BLD AUTO: 42 K/UL — LOW (ref 150–400)
RBC # BLD: 2.28 M/UL — LOW (ref 4.2–5.8)
RBC # FLD: 21.9 % — HIGH (ref 10.3–14.5)
WBC # BLD: 2.94 K/UL — LOW (ref 3.8–10.5)
WBC # FLD AUTO: 2.94 K/UL — LOW (ref 3.8–10.5)

## 2022-04-07 ENCOUNTER — APPOINTMENT (OUTPATIENT)
Dept: INFUSION THERAPY | Facility: HOSPITAL | Age: 37
End: 2022-04-07

## 2022-04-07 ENCOUNTER — RESULT REVIEW (OUTPATIENT)
Age: 37
End: 2022-04-07

## 2022-04-07 ENCOUNTER — NON-APPOINTMENT (OUTPATIENT)
Age: 37
End: 2022-04-07

## 2022-04-07 LAB
BASOPHILS # BLD AUTO: 0.01 K/UL — SIGNIFICANT CHANGE UP (ref 0–0.2)
BASOPHILS NFR BLD AUTO: 0.4 % — SIGNIFICANT CHANGE UP (ref 0–2)
EOSINOPHIL # BLD AUTO: 0.08 K/UL — SIGNIFICANT CHANGE UP (ref 0–0.5)
EOSINOPHIL NFR BLD AUTO: 3.3 % — SIGNIFICANT CHANGE UP (ref 0–6)
HCT VFR BLD CALC: 22.5 % — LOW (ref 39–50)
HGB BLD-MCNC: 8.1 G/DL — LOW (ref 13–17)
IMM GRANULOCYTES NFR BLD AUTO: 2 % — HIGH (ref 0–1.5)
LYMPHOCYTES # BLD AUTO: 0.91 K/UL — LOW (ref 1–3.3)
LYMPHOCYTES # BLD AUTO: 37.1 % — SIGNIFICANT CHANGE UP (ref 13–44)
MCHC RBC-ENTMCNC: 34.8 PG — HIGH (ref 27–34)
MCHC RBC-ENTMCNC: 36 G/DL — SIGNIFICANT CHANGE UP (ref 32–36)
MCV RBC AUTO: 96.6 FL — SIGNIFICANT CHANGE UP (ref 80–100)
MONOCYTES # BLD AUTO: 0.34 K/UL — SIGNIFICANT CHANGE UP (ref 0–0.9)
MONOCYTES NFR BLD AUTO: 13.9 % — SIGNIFICANT CHANGE UP (ref 2–14)
NEUTROPHILS # BLD AUTO: 1.06 K/UL — LOW (ref 1.8–7.4)
NEUTROPHILS NFR BLD AUTO: 43.3 % — SIGNIFICANT CHANGE UP (ref 43–77)
NRBC # BLD: 2 /100 WBCS — HIGH (ref 0–0)
PLATELET # BLD AUTO: 38 K/UL — LOW (ref 150–400)
RBC # BLD: 2.33 M/UL — LOW (ref 4.2–5.8)
RBC # FLD: 22.5 % — HIGH (ref 10.3–14.5)
WBC # BLD: 2.45 K/UL — LOW (ref 3.8–10.5)
WBC # FLD AUTO: 2.45 K/UL — LOW (ref 3.8–10.5)

## 2022-04-08 NOTE — REVIEW OF SYSTEMS
[Nosebleeds] : nosebleeds [Abdominal Pain] : abdominal pain [Negative] : Integumentary [Vision Problems] : no vision problems [Dysphagia] : no dysphagia [Odynophagia] : no odynophagia [Chest Pain] : no chest pain [Palpitations] : no palpitations [Lower Ext Edema] : no lower extremity edema [Vomiting] : no vomiting [Constipation] : no constipation [Diarrhea] : no diarrhea [Muscle Weakness] : no muscle weakness [Insomnia] : no insomnia [Anxiety] : no anxiety [Hot Flashes] : no hot flashes [FreeTextEntry4] : occasional when platelets are low and limited in amount [FreeTextEntry7] : (+) RLQ pain and nausea per HPI

## 2022-04-08 NOTE — ASSESSMENT
[FreeTextEntry1] : 35 y/o M presented April 2020 with aplastic anemia (very severe AA) confirmed on bone marrow biopsy at [facility] and now s/p inpatient treatment beginning 4/23/2020 with horse ATG + CsA + promacta with complete remission. Bone marrow bx on 11/9/20 revealed hypocellular marrow, overall cellularity improved (20-50%) showing marrow regeneration. BMBx on 5/18/2021 showing normal morphology with normal cellularity. Patient with worsening cytopenias in November 2021 and with neutropenic fever as well, sent inpatient, s/p treatment with rabbit ATG, prednisone, cyclosporine and Promacta, here for follow up. \par \par -Started Rabbit ATG on 12/27- approaching two months of therapy now. \par -Patient remains pancytopenic and transfusion dependent. Severe neutropenia, however now sustained over 500 so offered encouragement to the patient today in the office. With AA especially when relapsed it can take a few months to respond. He is now demonstrating at least a partial response at this point. He is also requiring less transfusions which was reassuring to patient as well.\par -Severe neutropenia - continue with prophylaxis with levaquin, diflucan, and acyclovir for now. If ANC continuing to uptrend past 750 should be able to drop fluconazole. Today dropped to 880 so will continue for now.\par -Jaundiced and labs from 1/24 showing increase in bilirubin to 2.5 and elevated ALT to 297. Likely Promacta related. Patient discharged on 300 mg daily Promacta, had been taking 150 mg inpatient. Was decreased back to 150 mg daily CMP now showing normal T. Bili and ALT.\par -Labs on 3/8 showed a bump in creat to 1.4 likely pre-renal due to lack of PO intake 2/2 abdominal pain, n/v. This has mostly resolved and he has been tolerating a PO diet. Will repeat today. Patient aware that if he has similar symptoms going forward to call the office so we can assess him. \par -Continue possible platelets TIW as well as PRBCs as needed. Discussed proper oral hygiene while thrombocytopenic.\par -Patient no longer with low grade temperatures but still neutropenic.  Informed patient if he has any fevers he needs to immediately present to [facility] ED. He understands and agrees. \par -Continuing cyclosporine, was decreased on 1/28 to 400 mg BID, rechecked at true trough level and then became 550. He was instructed to reduce dose to 350mg BID at that point. Rechecked morning of 2/10 and decreased dose again to 300mg which he currently still continues with therapeutic levels. Will repeat CSA level again today. \par -Monitoring Cr closely as well to monitor for toxicity due to cyclosporine, checking today. \par -Abdominal pain / nausea likely related to gastritis - type symptoms as side effect to therapy, as this occurred with his initial course as well. Sucralfate helped him then, recommended him to take 3 times daily. \par -Once again discussed with patient the indication for allogeneic transplant. He prefers to wait and see if the immunosuppression works again, but is ready to pursue transplant if this fails. \par -Will plan for bone marrow biopsy in one month to evaluate for development of alternate bone marrow pathologies if counts not improving further. \par -Patient understands and agrees with plan. All information explained to the best of my ability.\par -RTC in 2 weeks.

## 2022-04-08 NOTE — PHYSICAL EXAM
[Fully active, able to carry on all pre-disease performance without restriction] : Status 0 - Fully active, able to carry on all pre-disease performance without restriction [Normal] : pharynx is unremarkable, moist mucus membrane, no oral lesions [de-identified] : jaundice appearance imporving [de-identified] : EOMI, scleral icterus improving [de-identified] : supple [de-identified] : (+) S1S2 RRR [de-identified] : no edema (+)pp [de-identified] : no tenderness [de-identified] : ROM intact [de-identified] : warm/dry [de-identified] : A&Ox3

## 2022-04-08 NOTE — HISTORY OF PRESENT ILLNESS
[Disease:__________________________] : Disease: [unfilled] [Therapy: ___] : Therapy: [unfilled] [de-identified] : 37 y/o M with no PMH presented to OSH originally for c/o dizziness and feeling unwell and was then transferred to [facility] for further hematologic evaluation as patient was found to be severely pancytopenic. Patient first started noticing scattered bruising throughout his body (mostly thighs and arms) about 6 weeks prior to presentation, and also had epistaxis. Pt had also noticed intermittent fevers and chills, responsive to tylenol, and noticed very dark stools for the same time period. Also with spontaneous gum bleeding and endorsed severe fatigue and SOB with exertion. Also noted 15 lbs weight loss, partially intentional over past 2-3 months prior to presentation.  Also c/o mild headache located in posterior aspect of his head, denies sore throat, cough, +nausea but no vomiting, no abdominal pain, no diarrhea, no BRBPR, no urinary symptoms, no LE swelling, +visual changes.     \par \par Patient was transferred to [facility] from [facility] on 4/18. Patient was started on IVF, Levaquin, and allopurinol. Patient transfused blood and platelets. Bone Marrow biopsy performed on 4/20 consistent with Aplastic Anemia. 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 Because of this, patient's platelet transfusion threshold was made 50k. Horse ATG test dose was applied 4/22 with no reaction. Treatment started 4/23 consisting of equine ATG at 40mg/kg/day x 4 days, Cyclosporine 10mg/kg/day divided doses BID, Eltrombopag 150mg daily. Patient had episode of hives with ATG infusion on Day 1 thus steroid regimen was changed from oral prednisone to IV Solumedrol Q8 for the duration of ATG treatment and transitioned back to oral prednisone 1mg/kg daily for days 5-10 then tapered. The patient had grade 3 transaminitis likely from Promacta and ATG and Promacta was held as of 5/1. While patient was in the hospital, his course was also complicated by epigastric pain consistent with dyspepsia, most likely due to cyclosporine. Was managed on pepcid BID and sucralfate q6 hours and mylanta as needed with good relief. \par \par On 5/12 the patient's transaminitis had improved(grade 2 total bili, grade 1 AST, grade 2 ALT) and Promacta 150 mg po QD was resumed. Pt's cyclosporine dose was adjusted  according to the level (goal 200-400) and he was on 400 mg po q12h as of 5/18. On 5/16, patient developed chest discomfort during platelet transfusion which improved with benadryl. EKG and CXR were wnl. Patient originally refused benadryl pre-medication prior to platelets that day due to side effects with taking Benadryl, and it was decided to premedicate pt with Claritin or Zyrtec pre blood transfusion instead. On 5/20, cyclosporine level was 460, therefore dose was lowered to 300 mg PO BID. Patient's vision had improved, so platelet transfusion threshold liberalized to 20k. \par \par Patient has since had a complete recovery of blood counts. He discontinued Promacta after 6 months of therapy. He has been monitored since then wit stable CBC and therapeutic cyclosporine levels. \par \par He has since had repeat bone marrow biopsy on 5/18/2021 with normal morphology and cellularity\par \par In September 2021 noted his platelets dropping, and ultimately slowly was becoming pancytopenia. He was restarted on cyclosporine at home, but ultimately was admitted in late December with neutropenic fever. Bone marrow biopsy was repeated on 12/2021 and showed decreased megakaryocytes and other findings consistent with relapse aplastic anemia. Patient was transferred to [address] [place] [address] on 12/23 to start treatment for relapse with rabbit ATG, cyclosporine, prednisone, and eltrombopag. On 12/27 rabbit ATG test dose was given with no adverse reaction. Cyclosporine was started evening of 12/26. Rabbit ATG started 12/29, patient with reaction upon increase in flow rate with fevers, rigors. Patient re-challenged 12/30, again had reaction after completion of Day 1 dose. ATG was subsequently held, but then restarted on 1/3 and completed remaining 5 day course on 1/6. Cyclosporine levels were checked biweekly, dose adjusted as needed based on level. Ultimately was discharged on 450 mg BID cyclosporine. Patient was seen by ENT for sinusitis and treated with Unasyn thru 1/19. Patient remained neutropenic and started ppx w/ levaquin on 1/20. [de-identified] : - Markedly hypocellular bone marrow (mostly 5% and focal 10-\par 15%) with focal minimal erythropoiesis, minimal to absent\par myelopoiesis, absent megakaryocytes, increased iron stores. Aplastic bone marrow.  [de-identified] : Cytogenetics: \par \par Result: Male karyotype\par Karyotype: 46,XY                    {12                    } [de-identified] : very severe Aplastic Anemia [FreeTextEntry1] : Therapy initiated 4/23/2020 [de-identified] : Patient presents today for follow up. He states overall he had been feeling better and in terms of GI complaints was no longer having any abdominal pain, only lingering nausea until this past weekend. He started getting abdominal pain with nausea and vomited once on Saturday. His girlfriend had told him his eyes were starting to look more yellow. Over the next three days the generalized abdominal pain continued with nausea and he was unable to eat or drink anything. His symptoms slowly started improving and when he was here on Tuesday for possible platelets labs were drawn showing normal liver and pancreas function, only a mildly elevated creat likely due to dehydration. CSA level was also WNL. Today he is feeling better only complains of the usual nausea that he has had since relapse and gum bleeding while using an electronic toothbrush. Still transfusion dependent although this is becoming less frequent.

## 2022-04-08 NOTE — ASSESSMENT
[FreeTextEntry1] : 35 y/o M presented April 2020 with aplastic anemia (very severe AA) confirmed on bone marrow biopsy at [facility] and now s/p inpatient treatment beginning 4/23/2020 with horse ATG + CsA + promacta with complete remission. Bone marrow bx on 11/9/20 revealed hypocellular marrow, overall cellularity improved (20-50%) showing marrow regeneration. BMBx on 5/18/2021 showing normal morphology with normal cellularity. Patient with worsening cytopenias in November 2021 and with neutropenic fever as well, sent inpatient, s/p treatment with rabbit ATG, prednisone, cyclosporine and Promacta, here for follow up. \par \par -Patient remains pancytopenia. Severe neutropenia. However, on today's CBC neutrophils beginning to uptrend, offered encouragement to the patient today in the office. \par -Severe neutropenia - continue with prophylaxis with levaquin, diflucan, and acyclovir.\par -Jaundiced and labs from 1/24 showing increase in bilirubin to 2.5 and elevated ALT to 297. Likely Promacta related. Patient discharged on 300 mg daily Promacta, had been taking 150 mg inpatient. Was decreased back to 150 mg daily CMP continues to show improvement with T. Bili and ALT. Patient advised that if any worsening feeling of malaise, nausea /vomiting, or abdominal pain he should report directly to [facility] ED. He understands and agrees. Also informed him if he or his partner notice him becoming more jaundiced to report to the [facility] ED. \par -Continue possible platelets TIW as well as PRBCs as needed. \par -Patient no longer with low grade temperatures but still neutropenic.  Informed patient if he has any fevers he needs to immediately present to [facility] ED. He understands and agrees. \par -Continuing cyclosporine, was decreased on 1/28 to 400 mg BID, rechecked this morning at true trough level and then became 550. He was instructed to reduce dose to 350mg BID at that point. Rechecked morning of 2/10 and decreased again to 300mg. Will repeat CSA level again today.\par -Monitoring Cr closely as well to monitor for toxicity due to cyclosporine, now normalized.\par -Once again discussed with patient the indication for allogeneic transplant. He prefers to wait and see if the immunosuppression works again, but is ready to pursue transplant if this fails. \par -Patient understands and agrees with plan. All information explained to the best of my ability.\par -RTC in 1 week.

## 2022-04-08 NOTE — PHYSICAL EXAM
[Fully active, able to carry on all pre-disease performance without restriction] : Status 0 - Fully active, able to carry on all pre-disease performance without restriction [Normal] : affect appropriate [de-identified] : jaundice appearance imporving [de-identified] : EOMI, scleral icterus improving [de-identified] : supple [de-identified] : (+) S1S2 RRR [de-identified] : no edema (+)pp [de-identified] : no tenderness [de-identified] : ROM intact [de-identified] : warm/dry [de-identified] : A&Ox3

## 2022-04-08 NOTE — HISTORY OF PRESENT ILLNESS
[Disease:__________________________] : Disease: [unfilled] [Therapy: ___] : Therapy: [unfilled] [de-identified] : 35 y/o M with no PMH presented to OSH originally for c/o dizziness and feeling unwell and was then transferred to [facility] for further hematologic evaluation as patient was found to be severely pancytopenic. Patient first started noticing scattered bruising throughout his body (mostly thighs and arms) about 6 weeks prior to presentation, and also had epistaxis. Pt had also noticed intermittent fevers and chills, responsive to tylenol, and noticed very dark stools for the same time period. Also with spontaneous gum bleeding and endorsed severe fatigue and SOB with exertion. Also noted 15 lbs weight loss, partially intentional over past 2-3 months prior to presentation.  Also c/o mild headache located in posterior aspect of his head, denies sore throat, cough, +nausea but no vomiting, no abdominal pain, no diarrhea, no BRBPR, no urinary symptoms, no LE swelling, +visual changes.     \par \par Patient was transferred to [facility] from [facility] on 4/18. Patient was started on IVF, Levaquin, and allopurinol. Patient transfused blood and platelets. Bone Marrow biopsy performed on 4/20 consistent with Aplastic Anemia. 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 Because of this, patient's platelet transfusion threshold was made 50k. Horse ATG test dose was applied 4/22 with no reaction. Treatment started 4/23 consisting of equine ATG at 40mg/kg/day x 4 days, Cyclosporine 10mg/kg/day divided doses BID, Eltrombopag 150mg daily. Patient had episode of hives with ATG infusion on Day 1 thus steroid regimen was changed from oral prednisone to IV Solumedrol Q8 for the duration of ATG treatment and transitioned back to oral prednisone 1mg/kg daily for days 5-10 then tapered. The patient had grade 3 transaminitis likely from Promacta and ATG and Promacta was held as of 5/1. While patient was in the hospital, his course was also complicated by epigastric pain consistent with dyspepsia, most likely due to cyclosporine. Was managed on pepcid BID and sucralfate q6 hours and mylanta as needed with good relief. \par \par On 5/12 the patient's transaminitis had improved(grade 2 total bili, grade 1 AST, grade 2 ALT) and Promacta 150 mg po QD was resumed. Pt's cyclosporine dose was adjusted  according to the level (goal 200-400) and he was on 400 mg po q12h as of 5/18. On 5/16, patient developed chest discomfort during platelet transfusion which improved with benadryl. EKG and CXR were wnl. Patient originally refused benadryl pre-medication prior to platelets that day due to side effects with taking Benadryl, and it was decided to premedicate pt with Claritin or Zyrtec pre blood transfusion instead. On 5/20, cyclosporine level was 460, therefore dose was lowered to 300 mg PO BID. Patient's vision had improved, so platelet transfusion threshold liberalized to 20k. \par \par Patient has since had a complete recovery of blood counts. He discontinued Promacta after 6 months of therapy. He has been monitored since then wit stable CBC and therapeutic cyclosporine levels. \par \par He has since had repeat bone marrow biopsy on 5/18/2021 with normal morphology and cellularity\par \par In September 2021 noted his platelets dropping, and ultimately slowly was becoming pancytopenia. He was restarted on cyclosporine at home, but ultimately was admitted in late December with neutropenic fever. Bone marrow biopsy was repeated on 12/2021 and showed decreased megakaryocytes and other findings consistent with relapse aplastic anemia. Patient was transferred to [address] [place] [address] on 12/23 to start treatment for relapse with rabbit ATG, cyclosporine, prednisone, and eltrombopag. On 12/27 rabbit ATG test dose was given with no adverse reaction. Cyclosporine was started evening of 12/26. Rabbit ATG started 12/29, patient with reaction upon increase in flow rate with fevers, rigors. Patient re-challenged 12/30, again had reaction after completion of Day 1 dose. ATG was subsequently held, but then restarted on 1/3 and completed remaining 5 day course on 1/6. Cyclosporine levels were checked biweekly, dose adjusted as needed based on level. Ultimately was discharged on 450 mg BID cyclosporine. Patient was seen by ENT for sinusitis and treated with Unasyn thru 1/19. Patient remained neutropenic and started ppx w/ levaquin on 1/20. [de-identified] : - Markedly hypocellular bone marrow (mostly 5% and focal 10-\par 15%) with focal minimal erythropoiesis, minimal to absent\par myelopoiesis, absent megakaryocytes, increased iron stores. Aplastic bone marrow.  [de-identified] : Cytogenetics: \par \par Result: Male karyotype\par Karyotype: 46,XY                  {12                  } [de-identified] : very severe Aplastic Anemia [FreeTextEntry1] : Therapy initiated 4/23/2020 [de-identified] : Patient is here today for blood transfusion and seen in treatment room for follow up. He has been feeing overall well, he does get fatigued from time to time and at that time he requires transfusional support. He states the following day his fatigue improves greatly. He has been having intermittent nausea and lower left quadrant pain that occurs every few days after he takes his medication, this happened in the past and he took Carafate which helped. He has been able to eat pretty well and been hydrating. Denies any new bruising or bleeding, CP, palpitations, SOB, vomiting, diarrhea or constipation.

## 2022-04-12 ENCOUNTER — RESULT REVIEW (OUTPATIENT)
Age: 37
End: 2022-04-12

## 2022-04-12 ENCOUNTER — APPOINTMENT (OUTPATIENT)
Dept: HEMATOLOGY ONCOLOGY | Facility: CLINIC | Age: 37
End: 2022-04-12

## 2022-04-12 ENCOUNTER — APPOINTMENT (OUTPATIENT)
Dept: INFUSION THERAPY | Facility: HOSPITAL | Age: 37
End: 2022-04-12

## 2022-04-12 LAB
BASOPHILS # BLD AUTO: 0.01 K/UL — SIGNIFICANT CHANGE UP (ref 0–0.2)
BASOPHILS NFR BLD AUTO: 0.5 % — SIGNIFICANT CHANGE UP (ref 0–2)
EOSINOPHIL # BLD AUTO: 0.03 K/UL — SIGNIFICANT CHANGE UP (ref 0–0.5)
EOSINOPHIL NFR BLD AUTO: 1.5 % — SIGNIFICANT CHANGE UP (ref 0–6)
HCT VFR BLD CALC: 22.6 % — LOW (ref 39–50)
HGB BLD-MCNC: 8 G/DL — LOW (ref 13–17)
IMM GRANULOCYTES NFR BLD AUTO: 0.5 % — SIGNIFICANT CHANGE UP (ref 0–1.5)
LYMPHOCYTES # BLD AUTO: 0.7 K/UL — LOW (ref 1–3.3)
LYMPHOCYTES # BLD AUTO: 34 % — SIGNIFICANT CHANGE UP (ref 13–44)
MCHC RBC-ENTMCNC: 35.2 PG — HIGH (ref 27–34)
MCHC RBC-ENTMCNC: 35.4 G/DL — SIGNIFICANT CHANGE UP (ref 32–36)
MCV RBC AUTO: 99.6 FL — SIGNIFICANT CHANGE UP (ref 80–100)
MONOCYTES # BLD AUTO: 0.26 K/UL — SIGNIFICANT CHANGE UP (ref 0–0.9)
MONOCYTES NFR BLD AUTO: 12.6 % — SIGNIFICANT CHANGE UP (ref 2–14)
NEUTROPHILS # BLD AUTO: 1.05 K/UL — LOW (ref 1.8–7.4)
NEUTROPHILS NFR BLD AUTO: 50.9 % — SIGNIFICANT CHANGE UP (ref 43–77)
NRBC # BLD: 0 /100 WBCS — SIGNIFICANT CHANGE UP (ref 0–0)
PLATELET # BLD AUTO: 43 K/UL — LOW (ref 150–400)
RBC # BLD: 2.27 M/UL — LOW (ref 4.2–5.8)
RBC # FLD: SIGNIFICANT CHANGE UP (ref 10.3–14.5)
WBC # BLD: 2.06 K/UL — LOW (ref 3.8–10.5)
WBC # FLD AUTO: 2.06 K/UL — LOW (ref 3.8–10.5)

## 2022-04-13 ENCOUNTER — NON-APPOINTMENT (OUTPATIENT)
Age: 37
End: 2022-04-13

## 2022-04-13 LAB
ALBUMIN SERPL ELPH-MCNC: 4.7 G/DL
ALP BLD-CCNC: 82 U/L
ALT SERPL-CCNC: 42 U/L
ANION GAP SERPL CALC-SCNC: 15 MMOL/L
AST SERPL-CCNC: 40 U/L
BILIRUB SERPL-MCNC: 1.5 MG/DL
BUN SERPL-MCNC: 27 MG/DL
CALCIUM SERPL-MCNC: 9.4 MG/DL
CHLORIDE SERPL-SCNC: 103 MMOL/L
CO2 SERPL-SCNC: 22 MMOL/L
CREAT SERPL-MCNC: 1.44 MG/DL
CYCLOSPORINE SER-MCNC: 348 NG/ML
EGFR: 65 ML/MIN/1.73M2
GLUCOSE SERPL-MCNC: 140 MG/DL
POTASSIUM SERPL-SCNC: 4.4 MMOL/L
PROT SERPL-MCNC: 7.6 G/DL
SODIUM SERPL-SCNC: 140 MMOL/L

## 2022-04-14 ENCOUNTER — APPOINTMENT (OUTPATIENT)
Dept: INFUSION THERAPY | Facility: HOSPITAL | Age: 37
End: 2022-04-14

## 2022-04-19 ENCOUNTER — APPOINTMENT (OUTPATIENT)
Dept: INFUSION THERAPY | Facility: HOSPITAL | Age: 37
End: 2022-04-19

## 2022-04-19 ENCOUNTER — RESULT REVIEW (OUTPATIENT)
Age: 37
End: 2022-04-19

## 2022-04-19 ENCOUNTER — APPOINTMENT (OUTPATIENT)
Dept: HEMATOLOGY ONCOLOGY | Facility: CLINIC | Age: 37
End: 2022-04-19

## 2022-04-19 LAB
BASOPHILS # BLD AUTO: 0.01 K/UL — SIGNIFICANT CHANGE UP (ref 0–0.2)
BASOPHILS NFR BLD AUTO: 0.4 % — SIGNIFICANT CHANGE UP (ref 0–2)
EOSINOPHIL # BLD AUTO: 0.05 K/UL — SIGNIFICANT CHANGE UP (ref 0–0.5)
EOSINOPHIL NFR BLD AUTO: 2.1 % — SIGNIFICANT CHANGE UP (ref 0–6)
HCT VFR BLD CALC: 22.8 % — LOW (ref 39–50)
HGB BLD-MCNC: 8.1 G/DL — LOW (ref 13–17)
IMM GRANULOCYTES NFR BLD AUTO: 0.8 % — SIGNIFICANT CHANGE UP (ref 0–1.5)
LYMPHOCYTES # BLD AUTO: 0.75 K/UL — LOW (ref 1–3.3)
LYMPHOCYTES # BLD AUTO: 31.4 % — SIGNIFICANT CHANGE UP (ref 13–44)
MCHC RBC-ENTMCNC: 35.5 G/DL — SIGNIFICANT CHANGE UP (ref 32–36)
MCHC RBC-ENTMCNC: 36.5 PG — HIGH (ref 27–34)
MCV RBC AUTO: 102.7 FL — HIGH (ref 80–100)
MONOCYTES # BLD AUTO: 0.35 K/UL — SIGNIFICANT CHANGE UP (ref 0–0.9)
MONOCYTES NFR BLD AUTO: 14.6 % — HIGH (ref 2–14)
NEUTROPHILS # BLD AUTO: 1.21 K/UL — LOW (ref 1.8–7.4)
NEUTROPHILS NFR BLD AUTO: 50.7 % — SIGNIFICANT CHANGE UP (ref 43–77)
NRBC # BLD: 0 /100 WBCS — SIGNIFICANT CHANGE UP (ref 0–0)
PLATELET # BLD AUTO: 52 K/UL — LOW (ref 150–400)
RBC # BLD: 2.22 M/UL — LOW (ref 4.2–5.8)
RBC # FLD: SIGNIFICANT CHANGE UP (ref 10.3–14.5)
WBC # BLD: 2.39 K/UL — LOW (ref 3.8–10.5)
WBC # FLD AUTO: 2.39 K/UL — LOW (ref 3.8–10.5)

## 2022-04-20 LAB
ALBUMIN SERPL ELPH-MCNC: 4.9 G/DL
ALP BLD-CCNC: 83 U/L
ALT SERPL-CCNC: 40 U/L
ANION GAP SERPL CALC-SCNC: 13 MMOL/L
AST SERPL-CCNC: 39 U/L
BILIRUB SERPL-MCNC: 1.2 MG/DL
BUN SERPL-MCNC: 28 MG/DL
CALCIUM SERPL-MCNC: 9.9 MG/DL
CHLORIDE SERPL-SCNC: 104 MMOL/L
CO2 SERPL-SCNC: 24 MMOL/L
CREAT SERPL-MCNC: 1.24 MG/DL
CYCLOSPORINE SER-MCNC: 181 NG/ML
EGFR: 78 ML/MIN/1.73M2
GLUCOSE SERPL-MCNC: 111 MG/DL
LDH SERPL-CCNC: 974 U/L
POTASSIUM SERPL-SCNC: 4.2 MMOL/L
PROT SERPL-MCNC: 7.9 G/DL
SODIUM SERPL-SCNC: 141 MMOL/L

## 2022-04-21 ENCOUNTER — APPOINTMENT (OUTPATIENT)
Dept: INFUSION THERAPY | Facility: HOSPITAL | Age: 37
End: 2022-04-21

## 2022-04-22 ENCOUNTER — OUTPATIENT (OUTPATIENT)
Dept: OUTPATIENT SERVICES | Facility: HOSPITAL | Age: 37
LOS: 1 days | Discharge: HOME | End: 2022-04-22

## 2022-04-22 ENCOUNTER — NON-APPOINTMENT (OUTPATIENT)
Age: 37
End: 2022-04-22

## 2022-04-22 DIAGNOSIS — Z98.890 OTHER SPECIFIED POSTPROCEDURAL STATES: Chronic | ICD-10-CM

## 2022-04-22 DIAGNOSIS — D64.9 ANEMIA, UNSPECIFIED: ICD-10-CM

## 2022-04-25 NOTE — PROGRESS NOTE ADULT - PROBLEM SELECTOR PLAN 1
4/20 BM Bx consistent with Aplastic Anemia  Status post Horse ATG 40mg/kg/day daily x 4 days (4/23- 4/26)  Continue Prednisone (taper to continue through 5/17) and CSA   CSA level today 123. will continue same dose,  increased to 600mg PO q12 on 4/30  Promacta on hold for increasing liver disfunction  Monitor CBC/Lytes and transfuse/replete PRN  Keep PLT> 50k for retinal hemorrhage  Strict Is and Os/Daily weights/Mouth Care  IVF stated

## 2022-04-26 ENCOUNTER — RESULT REVIEW (OUTPATIENT)
Age: 37
End: 2022-04-26

## 2022-04-26 ENCOUNTER — APPOINTMENT (OUTPATIENT)
Dept: HEMATOLOGY ONCOLOGY | Facility: CLINIC | Age: 37
End: 2022-04-26

## 2022-04-26 ENCOUNTER — APPOINTMENT (OUTPATIENT)
Dept: INFUSION THERAPY | Facility: HOSPITAL | Age: 37
End: 2022-04-26

## 2022-04-26 LAB
BASOPHILS # BLD AUTO: 0 K/UL — SIGNIFICANT CHANGE UP (ref 0–0.2)
BASOPHILS NFR BLD AUTO: 0 % — SIGNIFICANT CHANGE UP (ref 0–2)
EOSINOPHIL # BLD AUTO: 0.02 K/UL — SIGNIFICANT CHANGE UP (ref 0–0.5)
EOSINOPHIL NFR BLD AUTO: 1 % — SIGNIFICANT CHANGE UP (ref 0–6)
HCT VFR BLD CALC: 21.7 % — LOW (ref 39–50)
HGB BLD-MCNC: 7.5 G/DL — LOW (ref 13–17)
IMM GRANULOCYTES NFR BLD AUTO: 0.5 % — SIGNIFICANT CHANGE UP (ref 0–1.5)
LYMPHOCYTES # BLD AUTO: 0.66 K/UL — LOW (ref 1–3.3)
LYMPHOCYTES # BLD AUTO: 32.7 % — SIGNIFICANT CHANGE UP (ref 13–44)
MCHC RBC-ENTMCNC: 34.6 G/DL — SIGNIFICANT CHANGE UP (ref 32–36)
MCHC RBC-ENTMCNC: 36.2 PG — HIGH (ref 27–34)
MCV RBC AUTO: 104.8 FL — HIGH (ref 80–100)
MONOCYTES # BLD AUTO: 0.31 K/UL — SIGNIFICANT CHANGE UP (ref 0–0.9)
MONOCYTES NFR BLD AUTO: 15.3 % — HIGH (ref 2–14)
NEUTROPHILS # BLD AUTO: 1.02 K/UL — LOW (ref 1.8–7.4)
NEUTROPHILS NFR BLD AUTO: 50.5 % — SIGNIFICANT CHANGE UP (ref 43–77)
NRBC # BLD: 0 /100 WBCS — SIGNIFICANT CHANGE UP (ref 0–0)
PLATELET # BLD AUTO: 46 K/UL — LOW (ref 150–400)
RBC # BLD: 2.07 M/UL — LOW (ref 4.2–5.8)
RBC # FLD: 20.6 % — HIGH (ref 10.3–14.5)
WBC # BLD: 2.02 K/UL — LOW (ref 3.8–10.5)
WBC # FLD AUTO: 2.02 K/UL — LOW (ref 3.8–10.5)

## 2022-04-28 ENCOUNTER — APPOINTMENT (OUTPATIENT)
Dept: INFUSION THERAPY | Facility: HOSPITAL | Age: 37
End: 2022-04-28

## 2022-05-05 ENCOUNTER — RESULT REVIEW (OUTPATIENT)
Age: 37
End: 2022-05-05

## 2022-05-05 ENCOUNTER — APPOINTMENT (OUTPATIENT)
Dept: HEMATOLOGY ONCOLOGY | Facility: CLINIC | Age: 37
End: 2022-05-05
Payer: COMMERCIAL

## 2022-05-05 VITALS
WEIGHT: 234.35 LBS | OXYGEN SATURATION: 98 % | TEMPERATURE: 99.9 F | RESPIRATION RATE: 18 BRPM | HEIGHT: 68.11 IN | DIASTOLIC BLOOD PRESSURE: 70 MMHG | HEART RATE: 106 BPM | BODY MASS INDEX: 35.52 KG/M2 | SYSTOLIC BLOOD PRESSURE: 108 MMHG

## 2022-05-05 DIAGNOSIS — D61.818 OTHER PANCYTOPENIA: ICD-10-CM

## 2022-05-05 LAB
BASOPHILS # BLD AUTO: 0.01 K/UL — SIGNIFICANT CHANGE UP (ref 0–0.2)
BASOPHILS NFR BLD AUTO: 0.4 % — SIGNIFICANT CHANGE UP (ref 0–2)
EOSINOPHIL # BLD AUTO: 0.03 K/UL — SIGNIFICANT CHANGE UP (ref 0–0.5)
EOSINOPHIL NFR BLD AUTO: 1.3 % — SIGNIFICANT CHANGE UP (ref 0–6)
HCT VFR BLD CALC: 21.4 % — LOW (ref 39–50)
HGB BLD-MCNC: 7.4 G/DL — LOW (ref 13–17)
IMM GRANULOCYTES NFR BLD AUTO: 0.9 % — SIGNIFICANT CHANGE UP (ref 0–1.5)
LYMPHOCYTES # BLD AUTO: 1.03 K/UL — SIGNIFICANT CHANGE UP (ref 1–3.3)
LYMPHOCYTES # BLD AUTO: 44 % — SIGNIFICANT CHANGE UP (ref 13–44)
MCHC RBC-ENTMCNC: 34.6 G/DL — SIGNIFICANT CHANGE UP (ref 32–36)
MCHC RBC-ENTMCNC: 37.4 PG — HIGH (ref 27–34)
MCV RBC AUTO: 108.1 FL — HIGH (ref 80–100)
MONOCYTES # BLD AUTO: 0.34 K/UL — SIGNIFICANT CHANGE UP (ref 0–0.9)
MONOCYTES NFR BLD AUTO: 14.5 % — HIGH (ref 2–14)
NEUTROPHILS # BLD AUTO: 0.91 K/UL — LOW (ref 1.8–7.4)
NEUTROPHILS NFR BLD AUTO: 38.9 % — LOW (ref 43–77)
NRBC # BLD: 0 /100 WBCS — SIGNIFICANT CHANGE UP (ref 0–0)
PLATELET # BLD AUTO: 50 K/UL — LOW (ref 150–400)
RBC # BLD: 1.98 M/UL — LOW (ref 4.2–5.8)
RBC # FLD: 19.2 % — HIGH (ref 10.3–14.5)
WBC # BLD: 2.34 K/UL — LOW (ref 3.8–10.5)
WBC # FLD AUTO: 2.34 K/UL — LOW (ref 3.8–10.5)

## 2022-05-05 PROCEDURE — 99214 OFFICE O/P EST MOD 30 MIN: CPT

## 2022-05-06 ENCOUNTER — OUTPATIENT (OUTPATIENT)
Dept: OUTPATIENT SERVICES | Facility: HOSPITAL | Age: 37
LOS: 1 days | End: 2022-05-06
Payer: COMMERCIAL

## 2022-05-06 ENCOUNTER — NON-APPOINTMENT (OUTPATIENT)
Age: 37
End: 2022-05-06

## 2022-05-06 DIAGNOSIS — D64.9 ANEMIA, UNSPECIFIED: ICD-10-CM

## 2022-05-06 DIAGNOSIS — Z98.890 OTHER SPECIFIED POSTPROCEDURAL STATES: Chronic | ICD-10-CM

## 2022-05-06 PROBLEM — D61.818 PANCYTOPENIA: Status: ACTIVE | Noted: 2021-12-01

## 2022-05-06 NOTE — PHYSICAL EXAM
[Fully active, able to carry on all pre-disease performance without restriction] : Status 0 - Fully active, able to carry on all pre-disease performance without restriction [Normal] : affect appropriate [de-identified] : mild jaundice [de-identified] : EOMI, scleral icterus improving [de-identified] : supple [de-identified] : (+) S1S2 RRR [de-identified] : no edema (+)pp [de-identified] : no tenderness [de-identified] : ROM intact [de-identified] : warm/dry [de-identified] : A&Ox3

## 2022-05-06 NOTE — HISTORY OF PRESENT ILLNESS
[Disease:__________________________] : Disease: [unfilled] [Therapy: ___] : Therapy: [unfilled] [de-identified] : 37 y/o M with no PMH presented to OSH originally for c/o dizziness and feeling unwell and was then transferred to [facility] for further hematologic evaluation as patient was found to be severely pancytopenic. Patient first started noticing scattered bruising throughout his body (mostly thighs and arms) about 6 weeks prior to presentation, and also had epistaxis. Pt had also noticed intermittent fevers and chills, responsive to tylenol, and noticed very dark stools for the same time period. Also with spontaneous gum bleeding and endorsed severe fatigue and SOB with exertion. Also noted 15 lbs weight loss, partially intentional over past 2-3 months prior to presentation.  Also c/o mild headache located in posterior aspect of his head, denies sore throat, cough, +nausea but no vomiting, no abdominal pain, no diarrhea, no BRBPR, no urinary symptoms, no LE swelling, +visual changes.     \par \par Patient was transferred to [facility] from [facility] on 4/18. Patient was started on IVF, Levaquin, and allopurinol. Patient transfused blood and platelets. Bone Marrow biopsy performed on 4/20 consistent with Aplastic Anemia. 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 Because of this, patient's platelet transfusion threshold was made 50k. Horse ATG test dose was applied 4/22 with no reaction. Treatment started 4/23 consisting of equine ATG at 40mg/kg/day x 4 days, Cyclosporine 10mg/kg/day divided doses BID, Eltrombopag 150mg daily. Patient had episode of hives with ATG infusion on Day 1 thus steroid regimen was changed from oral prednisone to IV Solumedrol Q8 for the duration of ATG treatment and transitioned back to oral prednisone 1mg/kg daily for days 5-10 then tapered. The patient had grade 3 transaminitis likely from Promacta and ATG and Promacta was held as of 5/1. While patient was in the hospital, his course was also complicated by epigastric pain consistent with dyspepsia, most likely due to cyclosporine. Was managed on pepcid BID and sucralfate q6 hours and mylanta as needed with good relief. \par \par On 5/12 the patient's transaminitis had improved(grade 2 total bili, grade 1 AST, grade 2 ALT) and Promacta 150 mg po QD was resumed. Pt's cyclosporine dose was adjusted  according to the level (goal 200-400) and he was on 400 mg po q12h as of 5/18. On 5/16, patient developed chest discomfort during platelet transfusion which improved with benadryl. EKG and CXR were wnl. Patient originally refused benadryl pre-medication prior to platelets that day due to side effects with taking Benadryl, and it was decided to premedicate pt with Claritin or Zyrtec pre blood transfusion instead. On 5/20, cyclosporine level was 460, therefore dose was lowered to 300 mg PO BID. Patient's vision had improved, so platelet transfusion threshold liberalized to 20k. \par \par Patient has since had a complete recovery of blood counts. He discontinued Promacta after 6 months of therapy. He has been monitored since then wit stable CBC and therapeutic cyclosporine levels. \par \par He has since had repeat bone marrow biopsy on 5/18/2021 with normal morphology and cellularity\par \par In September 2021 noted his platelets dropping, and ultimately slowly was becoming pancytopenia. He was restarted on cyclosporine at home, but ultimately was admitted in late December with neutropenic fever. Bone marrow biopsy was repeated on 12/2021 and showed decreased megakaryocytes and other findings consistent with relapse aplastic anemia. Patient was transferred to [address] [place] [address] on 12/23 to start treatment for relapse with rabbit ATG, cyclosporine, prednisone, and eltrombopag. On 12/27 rabbit ATG test dose was given with no adverse reaction. Cyclosporine was started evening of 12/26. Rabbit ATG started 12/29, patient with reaction upon increase in flow rate with fevers, rigors. Patient re-challenged 12/30, again had reaction after completion of Day 1 dose. ATG was subsequently held, but then restarted on 1/3 and completed remaining 5 day course on 1/6. Cyclosporine levels were checked biweekly, dose adjusted as needed based on level. Ultimately was discharged on 450 mg BID cyclosporine. Patient was seen by ENT for sinusitis and treated with Unasyn thru 1/19. Patient remained neutropenic and started ppx w/ levaquin on 1/20. [de-identified] : - Markedly hypocellular bone marrow (mostly 5% and focal 10-\par 15%) with focal minimal erythropoiesis, minimal to absent\par myelopoiesis, absent megakaryocytes, increased iron stores. Aplastic bone marrow.  [de-identified] : Cytogenetics: \par \par Result: Male karyotype\par Karyotype: 46,XY                      {12                      } [de-identified] : very severe Aplastic Anemia [FreeTextEntry1] : Therapy initiated 4/23/2020 [de-identified] : Patient presents today for follow up. He "feels fine". He has not had a plt transfusion done in over 2 months. His plts today are 50k. He still feels the nausea sporadically but no definitive episodes of vomiting, more dry heaves from time to time. He feels fatigued with exertion, and additionally dyspnea on exertion which is relatively new. He denies having any recent fevers, chills. Appetite is normal.\par

## 2022-05-06 NOTE — ASSESSMENT
[FreeTextEntry1] : 35 y/o M presented April 2020 with aplastic anemia (very severe AA) confirmed on bone marrow biopsy at [facility] and now s/p inpatient treatment beginning 4/23/2020 with horse ATG + CsA + promacta with complete remission. Bone marrow bx on 11/9/20 revealed hypocellular marrow, overall cellularity improved (20-50%) showing marrow regeneration. BMBx on 5/18/2021 showing normal morphology with normal cellularity. Patient with worsening cytopenias in November 2021 and with neutropenic fever as well, sent inpatient, s/p treatment with rabbit ATG, prednisone, cyclosporine and Promacta, here for follow up. \par \par -Started Rabbit ATG on 12/27- now s/p 4 months of therapy\par -Patient remains pancytopenic but transfusion dependence is greatly reduced now. Patient no longer with severe neutropenia, now it is mild. Hasn't been on antibacterial and antifungal prophylaxis.\par -Still requiring PRBC transfusion every 3 weeks around. Hemoglobin 7.4 today. Also symptomatic with HAQ. Will plan to transfuse 2 units PRBC on Saturday 5/7. Will monitor ferritin levels, likely will need to begin chelation soon. \par -Jaundiced early 2021 likely Promacta related. Patient discharged on 300 mg daily Promacta, had been taking 150 mg inpatient. Was decreased back to 150 mg daily. Still with mildly elevated tbili. Will monitor LFTS closely to monitor for worsening hyperbilirubinemia. \par -Patient with recent ANSON (Cr up to 1.77) likely related to recent supratherapeutic cyclosporine levels. This has resolved and stabilized. Of note, patient took cyclosporine prior to trough level drawn today, will be innaccurate level today. To repeat in 2 weeks as long as creatinine remains at baseline. \par -Currently no longer platelet transfusion dependent. Cancelling possible platelet appointments for now. \par -Cyclosporine has been therapeutic at 300 mg twice a day. \par -Monitoring Cr closely as well to monitor for toxicity due to cyclosporine, checking today. \par -Abdominal pain / nausea likely related to gastritis - type symptoms as side effect to therapy, as this occurred with his initial course as well. Sucralfate helped him then, continue as needed. \par -Patient understands and agrees with plan. All information explained to the best of my ability.\par -Lab visit in 2 weeks, RTC in 1 month

## 2022-05-07 ENCOUNTER — RESULT REVIEW (OUTPATIENT)
Age: 37
End: 2022-05-07

## 2022-05-07 ENCOUNTER — APPOINTMENT (OUTPATIENT)
Dept: INFUSION THERAPY | Facility: HOSPITAL | Age: 37
End: 2022-05-07

## 2022-05-09 DIAGNOSIS — Z51.11 ENCOUNTER FOR ANTINEOPLASTIC CHEMOTHERAPY: ICD-10-CM

## 2022-05-09 DIAGNOSIS — R11.2 NAUSEA WITH VOMITING, UNSPECIFIED: ICD-10-CM

## 2022-05-09 DIAGNOSIS — Z51.89 ENCOUNTER FOR OTHER SPECIFIED AFTERCARE: ICD-10-CM

## 2022-05-13 LAB
ALBUMIN SERPL ELPH-MCNC: 4.7 G/DL
ALBUMIN SERPL ELPH-MCNC: 4.7 G/DL
ALP BLD-CCNC: 83 U/L
ALP BLD-CCNC: 85 U/L
ALT SERPL-CCNC: 23 U/L
ALT SERPL-CCNC: 36 U/L
ANION GAP SERPL CALC-SCNC: 14 MMOL/L
ANION GAP SERPL CALC-SCNC: 14 MMOL/L
AST SERPL-CCNC: 40 U/L
AST SERPL-CCNC: 52 U/L
BILIRUB SERPL-MCNC: 1.7 MG/DL
BILIRUB SERPL-MCNC: 2 MG/DL
BUN SERPL-MCNC: 26 MG/DL
BUN SERPL-MCNC: 27 MG/DL
CALCIUM SERPL-MCNC: 9 MG/DL
CALCIUM SERPL-MCNC: 9.3 MG/DL
CHLORIDE SERPL-SCNC: 104 MMOL/L
CHLORIDE SERPL-SCNC: 104 MMOL/L
CO2 SERPL-SCNC: 21 MMOL/L
CO2 SERPL-SCNC: 22 MMOL/L
CREAT SERPL-MCNC: 1.28 MG/DL
CREAT SERPL-MCNC: 1.41 MG/DL
CYCLOSPORINE SER-MCNC: 254 NG/ML
CYCLOSPORINE SER-MCNC: 621 NG/ML
EGFR: 67 ML/MIN/1.73M2
EGFR: 75 ML/MIN/1.73M2
GLUCOSE SERPL-MCNC: 101 MG/DL
GLUCOSE SERPL-MCNC: 124 MG/DL
LDH SERPL-CCNC: 915 U/L
POTASSIUM SERPL-SCNC: 4.1 MMOL/L
POTASSIUM SERPL-SCNC: 4.3 MMOL/L
PROT SERPL-MCNC: 7.1 G/DL
PROT SERPL-MCNC: 7.3 G/DL
SODIUM SERPL-SCNC: 139 MMOL/L
SODIUM SERPL-SCNC: 141 MMOL/L

## 2022-05-19 ENCOUNTER — RESULT REVIEW (OUTPATIENT)
Age: 37
End: 2022-05-19

## 2022-05-19 ENCOUNTER — APPOINTMENT (OUTPATIENT)
Dept: HEMATOLOGY ONCOLOGY | Facility: CLINIC | Age: 37
End: 2022-05-19

## 2022-05-19 LAB
BASOPHILS # BLD AUTO: 0 K/UL — SIGNIFICANT CHANGE UP (ref 0–0.2)
BASOPHILS NFR BLD AUTO: 0 % — SIGNIFICANT CHANGE UP (ref 0–2)
EOSINOPHIL # BLD AUTO: 0.08 K/UL — SIGNIFICANT CHANGE UP (ref 0–0.5)
EOSINOPHIL NFR BLD AUTO: 3.4 % — SIGNIFICANT CHANGE UP (ref 0–6)
HCT VFR BLD CALC: 26.5 % — LOW (ref 39–50)
HGB BLD-MCNC: 9.2 G/DL — LOW (ref 13–17)
IMM GRANULOCYTES NFR BLD AUTO: 0.9 % — SIGNIFICANT CHANGE UP (ref 0–1.5)
LYMPHOCYTES # BLD AUTO: 0.77 K/UL — LOW (ref 1–3.3)
LYMPHOCYTES # BLD AUTO: 33 % — SIGNIFICANT CHANGE UP (ref 13–44)
MCHC RBC-ENTMCNC: 34.7 G/DL — SIGNIFICANT CHANGE UP (ref 32–36)
MCHC RBC-ENTMCNC: 35.8 PG — HIGH (ref 27–34)
MCV RBC AUTO: 103.1 FL — HIGH (ref 80–100)
MONOCYTES # BLD AUTO: 0.28 K/UL — SIGNIFICANT CHANGE UP (ref 0–0.9)
MONOCYTES NFR BLD AUTO: 12 % — SIGNIFICANT CHANGE UP (ref 2–14)
NEUTROPHILS # BLD AUTO: 1.18 K/UL — LOW (ref 1.8–7.4)
NEUTROPHILS NFR BLD AUTO: 50.7 % — SIGNIFICANT CHANGE UP (ref 43–77)
NRBC # BLD: 0 /100 WBCS — SIGNIFICANT CHANGE UP (ref 0–0)
PLATELET # BLD AUTO: 58 K/UL — LOW (ref 150–400)
RBC # BLD: 2.57 M/UL — LOW (ref 4.2–5.8)
RBC # FLD: 17.5 % — HIGH (ref 10.3–14.5)
WBC # BLD: 2.33 K/UL — LOW (ref 3.8–10.5)
WBC # FLD AUTO: 2.33 K/UL — LOW (ref 3.8–10.5)

## 2022-05-19 PROCEDURE — 86923 COMPATIBILITY TEST ELECTRIC: CPT

## 2022-05-19 PROCEDURE — 86901 BLOOD TYPING SEROLOGIC RH(D): CPT

## 2022-05-19 PROCEDURE — 86900 BLOOD TYPING SEROLOGIC ABO: CPT

## 2022-05-19 PROCEDURE — 86850 RBC ANTIBODY SCREEN: CPT

## 2022-05-24 ENCOUNTER — NON-APPOINTMENT (OUTPATIENT)
Age: 37
End: 2022-05-24

## 2022-05-28 LAB
ABR COMMENT: NORMAL
ALBUMIN SERPL ELPH-MCNC: 4.9 G/DL
ALP BLD-CCNC: 88 U/L
ALT SERPL-CCNC: 46 U/L
ANION GAP SERPL CALC-SCNC: 13 MMOL/L
AST SERPL-CCNC: 43 U/L
BILIRUB SERPL-MCNC: 1.5 MG/DL
BUN SERPL-MCNC: 21 MG/DL
CALCIUM SERPL-MCNC: 9.9 MG/DL
CHLORIDE SERPL-SCNC: 102 MMOL/L
CO2 SERPL-SCNC: 24 MMOL/L
CREAT SERPL-MCNC: 1.17 MG/DL
CYCLOSPORINE SER-MCNC: 122 NG/ML
EGFR: 84 ML/MIN/1.73M2
GLUCOSE SERPL-MCNC: 122 MG/DL
HAPTOGLOB SERPL-MCNC: <20 MG/DL
PNH GRANULOCYTES: 72.46 %
PNH MONOCYTES: 75.88 %
PNH RBC - COMPLETE: 8.37 %
PNH RBC - PARTIAL: 0.07 %
POTASSIUM SERPL-SCNC: 4.2 MMOL/L
PROT SERPL-MCNC: 7.7 G/DL
SODIUM SERPL-SCNC: 138 MMOL/L

## 2022-06-02 ENCOUNTER — OUTPATIENT (OUTPATIENT)
Dept: OUTPATIENT SERVICES | Facility: HOSPITAL | Age: 37
LOS: 1 days | Discharge: HOME | End: 2022-06-02

## 2022-06-02 DIAGNOSIS — Z98.890 OTHER SPECIFIED POSTPROCEDURAL STATES: Chronic | ICD-10-CM

## 2022-06-02 DIAGNOSIS — D64.9 ANEMIA, UNSPECIFIED: ICD-10-CM

## 2022-06-06 ENCOUNTER — RESULT REVIEW (OUTPATIENT)
Age: 37
End: 2022-06-06

## 2022-06-06 ENCOUNTER — APPOINTMENT (OUTPATIENT)
Dept: HEMATOLOGY ONCOLOGY | Facility: CLINIC | Age: 37
End: 2022-06-06

## 2022-06-06 ENCOUNTER — LABORATORY RESULT (OUTPATIENT)
Age: 37
End: 2022-06-06

## 2022-06-06 LAB
BASOPHILS # BLD AUTO: 0.01 K/UL — SIGNIFICANT CHANGE UP (ref 0–0.2)
BASOPHILS NFR BLD AUTO: 0.4 % — SIGNIFICANT CHANGE UP (ref 0–2)
EOSINOPHIL # BLD AUTO: 0.06 K/UL — SIGNIFICANT CHANGE UP (ref 0–0.5)
EOSINOPHIL NFR BLD AUTO: 2.6 % — SIGNIFICANT CHANGE UP (ref 0–6)
HCT VFR BLD CALC: 24.2 % — LOW (ref 39–50)
HGB BLD-MCNC: 8.3 G/DL — LOW (ref 13–17)
IMM GRANULOCYTES NFR BLD AUTO: 0.9 % — SIGNIFICANT CHANGE UP (ref 0–1.5)
LYMPHOCYTES # BLD AUTO: 1.05 K/UL — SIGNIFICANT CHANGE UP (ref 1–3.3)
LYMPHOCYTES # BLD AUTO: 44.7 % — HIGH (ref 13–44)
MCHC RBC-ENTMCNC: 34.3 G/DL — SIGNIFICANT CHANGE UP (ref 32–36)
MCHC RBC-ENTMCNC: 35.6 PG — HIGH (ref 27–34)
MCV RBC AUTO: 103.9 FL — HIGH (ref 80–100)
MONOCYTES # BLD AUTO: 0.32 K/UL — SIGNIFICANT CHANGE UP (ref 0–0.9)
MONOCYTES NFR BLD AUTO: 13.6 % — SIGNIFICANT CHANGE UP (ref 2–14)
NEUTROPHILS # BLD AUTO: 0.89 K/UL — LOW (ref 1.8–7.4)
NEUTROPHILS NFR BLD AUTO: 37.8 % — LOW (ref 43–77)
NRBC # BLD: 0 /100 WBCS — SIGNIFICANT CHANGE UP (ref 0–0)
PLATELET # BLD AUTO: 55 K/UL — LOW (ref 150–400)
RBC # BLD: 2.33 M/UL — LOW (ref 4.2–5.8)
RBC # FLD: 17.3 % — HIGH (ref 10.3–14.5)
WBC # BLD: 2.35 K/UL — LOW (ref 3.8–10.5)
WBC # FLD AUTO: 2.35 K/UL — LOW (ref 3.8–10.5)

## 2022-06-06 PROCEDURE — 86901 BLOOD TYPING SEROLOGIC RH(D): CPT

## 2022-06-06 PROCEDURE — 86900 BLOOD TYPING SEROLOGIC ABO: CPT

## 2022-06-06 PROCEDURE — 86850 RBC ANTIBODY SCREEN: CPT

## 2022-06-06 PROCEDURE — 86880 COOMBS TEST DIRECT: CPT

## 2022-06-08 ENCOUNTER — RESULT REVIEW (OUTPATIENT)
Age: 37
End: 2022-06-08

## 2022-06-08 ENCOUNTER — APPOINTMENT (OUTPATIENT)
Dept: HEMATOLOGY ONCOLOGY | Facility: CLINIC | Age: 37
End: 2022-06-08
Payer: COMMERCIAL

## 2022-06-08 VITALS
SYSTOLIC BLOOD PRESSURE: 119 MMHG | DIASTOLIC BLOOD PRESSURE: 79 MMHG | OXYGEN SATURATION: 97 % | RESPIRATION RATE: 16 BRPM | HEART RATE: 81 BPM | BODY MASS INDEX: 35.78 KG/M2 | TEMPERATURE: 97.7 F | HEIGHT: 68.11 IN | WEIGHT: 236.12 LBS

## 2022-06-08 DIAGNOSIS — R74.01 ELEVATION OF LEVELS OF LIVER TRANSAMINASE LEVELS: ICD-10-CM

## 2022-06-08 LAB
BASOPHILS # BLD AUTO: 0.01 K/UL — SIGNIFICANT CHANGE UP (ref 0–0.2)
BASOPHILS NFR BLD AUTO: 0.4 % — SIGNIFICANT CHANGE UP (ref 0–2)
EOSINOPHIL # BLD AUTO: 0.04 K/UL — SIGNIFICANT CHANGE UP (ref 0–0.5)
EOSINOPHIL NFR BLD AUTO: 1.6 % — SIGNIFICANT CHANGE UP (ref 0–6)
HCT VFR BLD CALC: 23.4 % — LOW (ref 39–50)
HGB BLD-MCNC: 8.5 G/DL — LOW (ref 13–17)
IMM GRANULOCYTES NFR BLD AUTO: 2.7 % — HIGH (ref 0–1.5)
LYMPHOCYTES # BLD AUTO: 1.11 K/UL — SIGNIFICANT CHANGE UP (ref 1–3.3)
LYMPHOCYTES # BLD AUTO: 43.2 % — SIGNIFICANT CHANGE UP (ref 13–44)
MCHC RBC-ENTMCNC: 36.3 G/DL — HIGH (ref 32–36)
MCHC RBC-ENTMCNC: 36.6 PG — HIGH (ref 27–34)
MCV RBC AUTO: 100.9 FL — HIGH (ref 80–100)
MONOCYTES # BLD AUTO: 0.31 K/UL — SIGNIFICANT CHANGE UP (ref 0–0.9)
MONOCYTES NFR BLD AUTO: 12.1 % — SIGNIFICANT CHANGE UP (ref 2–14)
NEUTROPHILS # BLD AUTO: 1.03 K/UL — LOW (ref 1.8–7.4)
NEUTROPHILS NFR BLD AUTO: 40 % — LOW (ref 43–77)
NRBC # BLD: 0 /100 WBCS — SIGNIFICANT CHANGE UP (ref 0–0)
PLATELET # BLD AUTO: 52 K/UL — LOW (ref 150–400)
RBC # BLD: 2.32 M/UL — LOW (ref 4.2–5.8)
RBC # FLD: 17.5 % — HIGH (ref 10.3–14.5)
WBC # BLD: 2.57 K/UL — LOW (ref 3.8–10.5)
WBC # FLD AUTO: 2.57 K/UL — LOW (ref 3.8–10.5)

## 2022-06-08 PROCEDURE — 99214 OFFICE O/P EST MOD 30 MIN: CPT

## 2022-06-08 RX ORDER — CYCLOSPORINE 25 MG/1
25 CAPSULE, LIQUID FILLED ORAL
Qty: 180 | Refills: 0 | Status: DISCONTINUED | COMMUNITY
Start: 2022-01-18

## 2022-06-08 RX ORDER — PREDNISONE 10 MG/1
10 TABLET ORAL
Qty: 13 | Refills: 0 | Status: DISCONTINUED | COMMUNITY
Start: 2022-01-19

## 2022-06-08 RX ORDER — POSACONAZOLE 100 MG/1
100 TABLET, DELAYED RELEASE ORAL
Qty: 90 | Refills: 0 | Status: DISCONTINUED | COMMUNITY
Start: 2022-01-18

## 2022-06-08 NOTE — HISTORY OF PRESENT ILLNESS
[Disease:__________________________] : Disease: [unfilled] [Therapy: ___] : Therapy: [unfilled] [de-identified] : 37 y/o M with no PMH presented to OSH originally for c/o dizziness and feeling unwell and was then transferred to [facility] for further hematologic evaluation as patient was found to be severely pancytopenic. Patient first started noticing scattered bruising throughout his body (mostly thighs and arms) about 6 weeks prior to presentation, and also had epistaxis. Pt had also noticed intermittent fevers and chills, responsive to tylenol, and noticed very dark stools for the same time period. Also with spontaneous gum bleeding and endorsed severe fatigue and SOB with exertion. Also noted 15 lbs weight loss, partially intentional over past 2-3 months prior to presentation.  Also c/o mild headache located in posterior aspect of his head, denies sore throat, cough, +nausea but no vomiting, no abdominal pain, no diarrhea, no BRBPR, no urinary symptoms, no LE swelling, +visual changes.     \par \par Patient was transferred to [facility] from [facility] on 4/18. Patient was started on IVF, Levaquin, and allopurinol. Patient transfused blood and platelets. Bone Marrow biopsy performed on 4/20 consistent with Aplastic Anemia. 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 Because of this, patient's platelet transfusion threshold was made 50k. Horse ATG test dose was applied 4/22 with no reaction. Treatment started 4/23 consisting of equine ATG at 40mg/kg/day x 4 days, Cyclosporine 10mg/kg/day divided doses BID, Eltrombopag 150mg daily. Patient had episode of hives with ATG infusion on Day 1 thus steroid regimen was changed from oral prednisone to IV Solumedrol Q8 for the duration of ATG treatment and transitioned back to oral prednisone 1mg/kg daily for days 5-10 then tapered. The patient had grade 3 transaminitis likely from Promacta and ATG and Promacta was held as of 5/1. While patient was in the hospital, his course was also complicated by epigastric pain consistent with dyspepsia, most likely due to cyclosporine. Was managed on pepcid BID and sucralfate q6 hours and mylanta as needed with good relief. \par \par On 5/12 the patient's transaminitis had improved(grade 2 total bili, grade 1 AST, grade 2 ALT) and Promacta 150 mg po QD was resumed. Pt's cyclosporine dose was adjusted  according to the level (goal 200-400) and he was on 400 mg po q12h as of 5/18. On 5/16, patient developed chest discomfort during platelet transfusion which improved with benadryl. EKG and CXR were wnl. Patient originally refused benadryl pre-medication prior to platelets that day due to side effects with taking Benadryl, and it was decided to premedicate pt with Claritin or Zyrtec pre blood transfusion instead. On 5/20, cyclosporine level was 460, therefore dose was lowered to 300 mg PO BID. Patient's vision had improved, so platelet transfusion threshold liberalized to 20k. \par \par Patient has since had a complete recovery of blood counts. He discontinued Promacta after 6 months of therapy. He has been monitored since then wit stable CBC and therapeutic cyclosporine levels. \par \par He has since had repeat bone marrow biopsy on 5/18/2021 with normal morphology and cellularity\par \par In September 2021 noted his platelets dropping, and ultimately slowly was becoming pancytopenia. He was restarted on cyclosporine at home, but ultimately was admitted in late December with neutropenic fever. Bone marrow biopsy was repeated on 12/2021 and showed decreased megakaryocytes and other findings consistent with relapse aplastic anemia. Patient was transferred to [address] [place] [address] on 12/23 to start treatment for relapse with rabbit ATG, cyclosporine, prednisone, and eltrombopag. On 12/27 rabbit ATG test dose was given with no adverse reaction. Cyclosporine was started evening of 12/26. Rabbit ATG started 12/29, patient with reaction upon increase in flow rate with fevers, rigors. Patient re-challenged 12/30, again had reaction after completion of Day 1 dose. ATG was subsequently held, but then restarted on 1/3 and completed remaining 5 day course on 1/6. Cyclosporine levels were checked biweekly, dose adjusted as needed based on level. Ultimately was discharged on 450 mg BID cyclosporine. Patient was seen by ENT for sinusitis and treated with Unasyn thru 1/19. Patient remained neutropenic and started ppx w/ levaquin on 1/20. [de-identified] : - Markedly hypocellular bone marrow (mostly 5% and focal 10-\par 15%) with focal minimal erythropoiesis, minimal to absent\par myelopoiesis, absent megakaryocytes, increased iron stores. Aplastic bone marrow.  [de-identified] : Cytogenetics: \par \par Result: Male karyotype\par Karyotype: 46,XY                       {12                       } [de-identified] : very severe Aplastic Anemia [FreeTextEntry1] : Therapy initiated 4/23/2020 [de-identified] : On follow up on 6/8/22 patient presents today for follow up. He "feels normal" today. He does not feel fatigued with exertion anymore. He is interested in having PICC removed. He denied any nausea and vomiting. He only feels nausea after taking Promacta slightly but does not usually need to take supportive medicine for it. Denied any swelling in the abdomen. No issues with bowel or urinary function.  He denies having any recent fevers, chills. Appetite is normal. He has been gaining weight.

## 2022-06-08 NOTE — ASSESSMENT
[FreeTextEntry1] : 35 y/o M presented April 2020 with aplastic anemia (very severe AA) confirmed on bone marrow biopsy at [facility] and now s/p inpatient treatment beginning 4/23/2020 with horse ATG + CsA + promacta with complete remission. Bone marrow bx on 11/9/20 revealed hypocellular marrow, overall cellularity improved (20-50%) showing marrow regeneration. BMBx on 5/18/2021 showing normal morphology with normal cellularity. Patient with worsening cytopenias in November 2021 and with neutropenic fever as well, sent inpatient, s/p treatment with rabbit ATG, prednisone, cyclosporine and Promacta, here for follow up. \par \par -Started Rabbit ATG on 12/27- now s/p 5 months of therapy\par -Patient remains pancytopenic but no longer requiring transfusions and not severely neutropenic. \par -Jaundiced early 2021 likely Promacta related. Patient discharged on 300 mg daily Promacta, had been taking 150 mg inpatient. Was decreased back to 150 mg daily. Still with mildly elevated tbili which has not resolved. Will check abdominal ultrasound and if normal consider decreasing to 75 mg daily. \par -Patient with recent ANSON likely related to recent supratherapeutic cyclosporine levels. This had resolved and stabilized. Cyclosporine level (trough) on 6/6/22 was 337, and creatinine up to 1.53. Cyclosporine now on 300 mg twice daily. Decreasing to 250 mg twice daily and will recheck in 2 weeks. \par -Monitoring Cr closely as well to monitor for toxicity due to cyclosporine.\par -Abdominal pain / nausea likely related to gastritis - type symptoms as side effect to therapy, as this occurred with his initial course as well. Sucralfate helped him then, continue as needed. This has greatly improved. \par -Discussed patient becoming a , and how at this point I feel based on his counts he is still not safe to perform a job like that \par -Plan to discontinue the PICC line.  \par -Patient understands and agrees with plan. All information explained to the best of my ability.\par -Lab visit in 2 weeks, RTC in 1 month

## 2022-06-08 NOTE — RESULTS/DATA
[FreeTextEntry1] : Labs on presentation:\par WBC 3.29\par Hb 3.6\par Hct 9.9\par Plt 2\par Abs Retic 6.8\par  (dropped to 70 the next day) \par \par  PAST SURGICAL HISTORY:  H/O partial thyroidectomy     S/P partial hysterectomy

## 2022-06-08 NOTE — PHYSICAL EXAM
[Fully active, able to carry on all pre-disease performance without restriction] : Status 0 - Fully active, able to carry on all pre-disease performance without restriction [Normal] : affect appropriate [de-identified] : mild jaundice [de-identified] : EOMI, scleral icterus [de-identified] : supple [de-identified] : (+) S1S2 RRR [de-identified] : no edema (+)pp [de-identified] : no tenderness [de-identified] : ROM intact [de-identified] : warm/dry [de-identified] : A&Ox3

## 2022-06-28 ENCOUNTER — APPOINTMENT (OUTPATIENT)
Dept: HEMATOLOGY ONCOLOGY | Facility: CLINIC | Age: 37
End: 2022-06-28

## 2022-07-13 ENCOUNTER — APPOINTMENT (OUTPATIENT)
Dept: ULTRASOUND IMAGING | Facility: CLINIC | Age: 37
End: 2022-07-13
Payer: COMMERCIAL

## 2022-07-13 ENCOUNTER — OUTPATIENT (OUTPATIENT)
Dept: OUTPATIENT SERVICES | Facility: HOSPITAL | Age: 37
LOS: 1 days | End: 2022-07-13

## 2022-07-13 DIAGNOSIS — Z98.890 OTHER SPECIFIED POSTPROCEDURAL STATES: Chronic | ICD-10-CM

## 2022-07-13 DIAGNOSIS — R74.01 ELEVATION OF LEVELS OF LIVER TRANSAMINASE LEVELS: ICD-10-CM

## 2022-07-13 PROCEDURE — 76700 US EXAM ABDOM COMPLETE: CPT | Mod: 26

## 2022-07-14 ENCOUNTER — APPOINTMENT (OUTPATIENT)
Dept: HEMATOLOGY ONCOLOGY | Facility: CLINIC | Age: 37
End: 2022-07-14

## 2022-07-19 ENCOUNTER — NON-APPOINTMENT (OUTPATIENT)
Age: 37
End: 2022-07-19

## 2022-07-19 ENCOUNTER — RESULT REVIEW (OUTPATIENT)
Age: 37
End: 2022-07-19

## 2022-07-19 ENCOUNTER — APPOINTMENT (OUTPATIENT)
Dept: HEMATOLOGY ONCOLOGY | Facility: CLINIC | Age: 37
End: 2022-07-19

## 2022-07-19 ENCOUNTER — OUTPATIENT (OUTPATIENT)
Dept: OUTPATIENT SERVICES | Facility: HOSPITAL | Age: 37
LOS: 1 days | End: 2022-07-19
Payer: COMMERCIAL

## 2022-07-19 VITALS
TEMPERATURE: 98.4 F | BODY MASS INDEX: 34.75 KG/M2 | WEIGHT: 229.28 LBS | DIASTOLIC BLOOD PRESSURE: 80 MMHG | HEART RATE: 106 BPM | OXYGEN SATURATION: 97 % | RESPIRATION RATE: 16 BRPM | SYSTOLIC BLOOD PRESSURE: 126 MMHG | HEIGHT: 68.11 IN

## 2022-07-19 DIAGNOSIS — D61.9 APLASTIC ANEMIA, UNSPECIFIED: ICD-10-CM

## 2022-07-19 DIAGNOSIS — R11.0 NAUSEA: ICD-10-CM

## 2022-07-19 DIAGNOSIS — Z98.890 OTHER SPECIFIED POSTPROCEDURAL STATES: Chronic | ICD-10-CM

## 2022-07-19 LAB
BASOPHILS # BLD AUTO: 0 K/UL — SIGNIFICANT CHANGE UP (ref 0–0.2)
BASOPHILS NFR BLD AUTO: 0 % — SIGNIFICANT CHANGE UP (ref 0–2)
EOSINOPHIL # BLD AUTO: 0.02 K/UL — SIGNIFICANT CHANGE UP (ref 0–0.5)
EOSINOPHIL NFR BLD AUTO: 1 % — SIGNIFICANT CHANGE UP (ref 0–6)
HCT VFR BLD CALC: 18.4 % — CRITICAL LOW (ref 39–50)
HGB BLD-MCNC: 6.4 G/DL — CRITICAL LOW (ref 13–17)
IMM GRANULOCYTES NFR BLD AUTO: 0.5 % — SIGNIFICANT CHANGE UP (ref 0–1.5)
LYMPHOCYTES # BLD AUTO: 0.82 K/UL — LOW (ref 1–3.3)
LYMPHOCYTES # BLD AUTO: 41 % — SIGNIFICANT CHANGE UP (ref 13–44)
MCHC RBC-ENTMCNC: 34.8 G/DL — SIGNIFICANT CHANGE UP (ref 32–36)
MCHC RBC-ENTMCNC: 38.1 PG — HIGH (ref 27–34)
MCV RBC AUTO: 109.5 FL — HIGH (ref 80–100)
MONOCYTES # BLD AUTO: 0.31 K/UL — SIGNIFICANT CHANGE UP (ref 0–0.9)
MONOCYTES NFR BLD AUTO: 15.5 % — HIGH (ref 2–14)
NEUTROPHILS # BLD AUTO: 0.84 K/UL — LOW (ref 1.8–7.4)
NEUTROPHILS NFR BLD AUTO: 42 % — LOW (ref 43–77)
NRBC # BLD: 0 /100 WBCS — SIGNIFICANT CHANGE UP (ref 0–0)
PLATELET # BLD AUTO: 59 K/UL — LOW (ref 150–400)
RBC # BLD: 1.68 M/UL — LOW (ref 4.2–5.8)
RBC # FLD: 19 % — HIGH (ref 10.3–14.5)
WBC # BLD: 2 K/UL — LOW (ref 3.8–10.5)
WBC # FLD AUTO: 2 K/UL — LOW (ref 3.8–10.5)

## 2022-07-19 PROCEDURE — 99214 OFFICE O/P EST MOD 30 MIN: CPT

## 2022-07-20 ENCOUNTER — APPOINTMENT (OUTPATIENT)
Dept: INFUSION THERAPY | Facility: HOSPITAL | Age: 37
End: 2022-07-20

## 2022-07-20 PROBLEM — R11.0 NAUSEA: Status: ACTIVE | Noted: 2020-06-08

## 2022-07-20 NOTE — PHYSICAL EXAM
[Fully active, able to carry on all pre-disease performance without restriction] : Status 0 - Fully active, able to carry on all pre-disease performance without restriction [Normal] : affect appropriate [Obese] : obese [de-identified] : JEANETH [de-identified] : supple [de-identified] : (+) S1S2 regular, tachy [de-identified] : no edema  [de-identified] : no tenderness [de-identified] : ROM intact [de-identified] : warm/dry [de-identified] : A&Ox3

## 2022-07-20 NOTE — HISTORY OF PRESENT ILLNESS
[Disease:__________________________] : Disease: [unfilled] [Therapy: ___] : Therapy: [unfilled] [de-identified] : 37 y/o M with no PMH presented to OSH originally for c/o dizziness and feeling unwell and was then transferred to [facility] for further hematologic evaluation as patient was found to be severely pancytopenic. Patient first started noticing scattered bruising throughout his body (mostly thighs and arms) about 6 weeks prior to presentation, and also had epistaxis. Pt had also noticed intermittent fevers and chills, responsive to tylenol, and noticed very dark stools for the same time period. Also with spontaneous gum bleeding and endorsed severe fatigue and SOB with exertion. Also noted 15 lbs weight loss, partially intentional over past 2-3 months prior to presentation.  Also c/o mild headache located in posterior aspect of his head, denies sore throat, cough, +nausea but no vomiting, no abdominal pain, no diarrhea, no BRBPR, no urinary symptoms, no LE swelling, +visual changes.     \par \par Patient was transferred to [facility] from [facility] on 4/18. Patient was started on IVF, Levaquin, and allopurinol. Patient transfused blood and platelets. Bone Marrow biopsy performed on 4/20 consistent with Aplastic Anemia. 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 Because of this, patient's platelet transfusion threshold was made 50k. Horse ATG test dose was applied 4/22 with no reaction. Treatment started 4/23 consisting of equine ATG at 40mg/kg/day x 4 days, Cyclosporine 10mg/kg/day divided doses BID, Eltrombopag 150mg daily. Patient had episode of hives with ATG infusion on Day 1 thus steroid regimen was changed from oral prednisone to IV Solumedrol Q8 for the duration of ATG treatment and transitioned back to oral prednisone 1mg/kg daily for days 5-10 then tapered. The patient had grade 3 transaminitis likely from Promacta and ATG and Promacta was held as of 5/1. While patient was in the hospital, his course was also complicated by epigastric pain consistent with dyspepsia, most likely due to cyclosporine. Was managed on pepcid BID and sucralfate q6 hours and mylanta as needed with good relief. \par \par On 5/12 the patient's transaminitis had improved(grade 2 total bili, grade 1 AST, grade 2 ALT) and Promacta 150 mg po QD was resumed. Pt's cyclosporine dose was adjusted  according to the level (goal 200-400) and he was on 400 mg po q12h as of 5/18. On 5/16, patient developed chest discomfort during platelet transfusion which improved with benadryl. EKG and CXR were wnl. Patient originally refused benadryl pre-medication prior to platelets that day due to side effects with taking Benadryl, and it was decided to premedicate pt with Claritin or Zyrtec pre blood transfusion instead. On 5/20, cyclosporine level was 460, therefore dose was lowered to 300 mg PO BID. Patient's vision had improved, so platelet transfusion threshold liberalized to 20k. \par \par Patient has since had a complete recovery of blood counts. He discontinued Promacta after 6 months of therapy. He has been monitored since then wit stable CBC and therapeutic cyclosporine levels. \par \par He has since had repeat bone marrow biopsy on 5/18/2021 with normal morphology and cellularity\par \par In September 2021 noted his platelets dropping, and ultimately slowly was becoming pancytopenia. He was restarted on cyclosporine at home, but ultimately was admitted in late December with neutropenic fever. Bone marrow biopsy was repeated on 12/2021 and showed decreased megakaryocytes and other findings consistent with relapse aplastic anemia. Patient was transferred to [address] [place] [address] on 12/23 to start treatment for relapse with rabbit ATG, cyclosporine, prednisone, and eltrombopag. On 12/27 rabbit ATG test dose was given with no adverse reaction. Cyclosporine was started evening of 12/26. Rabbit ATG started 12/29, patient with reaction upon increase in flow rate with fevers, rigors. Patient re-challenged 12/30, again had reaction after completion of Day 1 dose. ATG was subsequently held, but then restarted on 1/3 and completed remaining 5 day course on 1/6. Cyclosporine levels were checked biweekly, dose adjusted as needed based on level. Ultimately was discharged on 450 mg BID cyclosporine. Patient was seen by ENT for sinusitis and treated with Unasyn thru 1/19. Patient remained neutropenic and started ppx w/ levaquin on 1/20. [de-identified] : - Markedly hypocellular bone marrow (mostly 5% and focal 10-\par 15%) with focal minimal erythropoiesis, minimal to absent\par myelopoiesis, absent megakaryocytes, increased iron stores. Aplastic bone marrow.  [de-identified] : Cytogenetics: \par \par Result: Male karyotype\par Karyotype: 46,XY                        {12                        } [de-identified] : very severe Aplastic Anemia [FreeTextEntry1] : Therapy initiated 4/23/2020 [de-identified] : Today patient presents today for follow up. He states that he has been feeling more tired lately with accompanying shortness of breath. At times he will do simple tasks such as walking a short distance and "feels like he has walked a mile". This had been occurring prior to his last appointment and then resolved however this started to reoccur about 2 weeks ago. He does experience during these times he "feels his heart" but denies any CP or dizziness. PICC was removed after last visit. He does get nausea after taking Promacta which is not new but does not have any episodes of vomiting. Zofran and Reglan have not been helpful with this. Denied any abdominal pain or swelling in the abdomen. No issues with bowel or urinary function.  He denies having any recent fevers, chills, bruising, or bleeding. Appetite is normal. Weight stable.

## 2022-07-20 NOTE — REVIEW OF SYSTEMS
[Negative] : Heme/Lymph [Fatigue] : fatigue [Palpitations] : palpitations [SOB on Exertion] : shortness of breath during exertion [Fever] : no fever [Chills] : no chills [Night Sweats] : no night sweats [Recent Change In Weight] : ~T no recent weight change [Vision Problems] : no vision problems [Dysphagia] : no dysphagia [Nosebleeds] : no nosebleeds [Odynophagia] : no odynophagia [Chest Pain] : no chest pain [Lower Ext Edema] : no lower extremity edema [Wheezing] : no wheezing [Cough] : no cough [Abdominal Pain] : no abdominal pain [Vomiting] : no vomiting [Constipation] : no constipation [Diarrhea] : no diarrhea [Muscle Weakness] : no muscle weakness [Insomnia] : no insomnia [Anxiety] : no anxiety [Hot Flashes] : no hot flashes [FreeTextEntry7] : nausea

## 2022-07-20 NOTE — ASSESSMENT
[FreeTextEntry1] : 37 y/o M presented April 2020 with aplastic anemia (very severe AA) confirmed on bone marrow biopsy at [facility] and now s/p inpatient treatment beginning 4/23/2020 with horse ATG + CsA + promacta with complete remission. Bone marrow bx on 11/9/20 revealed hypocellular marrow, overall cellularity improved (20-50%) showing marrow regeneration. BMBx on 5/18/2021 showing normal morphology with normal cellularity. Patient with worsening cytopenias in November 2021 and with neutropenic fever as well, sent inpatient, s/p treatment with rabbit ATG, prednisone, cyclosporine and Promacta, here for follow up. \par \par -Started Rabbit ATG on 12/27- now s/p 5 months of therapy\par -Patient remains pancytopenic requiring intermittent transfusions. ANC today 840 will hold off on ppx until <750.\par -Hgb 6.4 today will transfuse 2 units.\par -Will repeat BM bx in 2 weeks to assess status. Will discuss adding Solaris after bx completed.\par -Jaundiced early 2021 likely Promacta related. Patient discharged on 300 mg daily Promacta, had been taking 150 mg inpatient. Was decreased back to 150 mg daily. Still with mildly elevated tbili which has not resolved. Abd US showed cholelithiasis without evidence of acute cholecystitis. Today T. Bili is now 2.5, will decrease Promacta to 75 mg daily, will fractionate tbili as well this is likely due to hemolysis. Will repeat abdominal US to r/o portal vein thrombosis. \par -Patient with recent ANSON likely related to recent supratherapeutic cyclosporine levels. This had resolved and stabilized. Cyclosporine level (trough) on 6/6/22 was 337, and creatinine up to 1.53. Cyclosporine now on 300 mg twice daily. Decreased to 250 mg twice daily at last visit no 2 week follow up labs completed. level today 89 however labs were drawn late in the day, will repeat early this Friday to verify level. \par -Will discuss transplant again with patient at upcoming visit.\par -Monitoring Cr closely as well to monitor for toxicity due to cyclosporine.\par -Abdominal pain / nausea likely related to gastritis - type symptoms as side effect to therapy, as this occurred with his initial course as well. Sucralfate helped him then, continue as needed. This has overall improved but patient does have some mild nausea after taking Promacta. Encouraged him to take Promacta before bed to see if this helps symptoms.\par -Discussed patient becoming a , and how at this point I feel based on his counts he is still not safe to perform a job like that \par -Patient also has not had COVID vaccination. This was discussed at length as well as risks and benefits. Last was positive for COVID at the end of June. Encouraged patient to get vaccinated due to immunocompromised state once clear. \par -PICC line discontinued.\par -Patient understands and agrees with plan. All information explained to the best of my ability.\par - RTC in 2 weeks

## 2022-07-21 ENCOUNTER — APPOINTMENT (OUTPATIENT)
Dept: INFUSION THERAPY | Facility: HOSPITAL | Age: 37
End: 2022-07-21

## 2022-07-21 DIAGNOSIS — Z51.89 ENCOUNTER FOR OTHER SPECIFIED AFTERCARE: ICD-10-CM

## 2022-07-21 DIAGNOSIS — R11.2 NAUSEA WITH VOMITING, UNSPECIFIED: ICD-10-CM

## 2022-07-22 ENCOUNTER — RESULT REVIEW (OUTPATIENT)
Age: 37
End: 2022-07-22

## 2022-07-22 ENCOUNTER — LABORATORY RESULT (OUTPATIENT)
Age: 37
End: 2022-07-22

## 2022-07-22 ENCOUNTER — APPOINTMENT (OUTPATIENT)
Dept: HEMATOLOGY ONCOLOGY | Facility: CLINIC | Age: 37
End: 2022-07-22

## 2022-07-22 ENCOUNTER — NON-APPOINTMENT (OUTPATIENT)
Age: 37
End: 2022-07-22

## 2022-07-22 ENCOUNTER — OUTPATIENT (OUTPATIENT)
Dept: OUTPATIENT SERVICES | Facility: HOSPITAL | Age: 37
LOS: 1 days | Discharge: HOME | End: 2022-07-22

## 2022-07-22 DIAGNOSIS — Z98.890 OTHER SPECIFIED POSTPROCEDURAL STATES: Chronic | ICD-10-CM

## 2022-07-22 DIAGNOSIS — D64.9 ANEMIA, UNSPECIFIED: ICD-10-CM

## 2022-07-22 LAB
ALBUMIN SERPL ELPH-MCNC: 4.5 G/DL
ALBUMIN SERPL ELPH-MCNC: 4.5 G/DL
ALP BLD-CCNC: 67 U/L
ALP BLD-CCNC: 68 U/L
ALT SERPL-CCNC: 28 U/L
ALT SERPL-CCNC: 30 U/L
ANION GAP SERPL CALC-SCNC: 13 MMOL/L
ANION GAP SERPL CALC-SCNC: 9 MMOL/L
ANISOCYTOSIS BLD QL: SLIGHT — SIGNIFICANT CHANGE UP
AST SERPL-CCNC: 66 U/L
AST SERPL-CCNC: 84 U/L
BASOPHILS # BLD AUTO: 0 K/UL — SIGNIFICANT CHANGE UP (ref 0–0.2)
BASOPHILS NFR BLD AUTO: 0 % — SIGNIFICANT CHANGE UP (ref 0–2)
BILIRUB INDIRECT SERPL-MCNC: 2.5 MG/DL
BILIRUB SERPL-MCNC: 2.5 MG/DL
BILIRUB SERPL-MCNC: 3.1 MG/DL
BUN SERPL-MCNC: 18 MG/DL
BUN SERPL-MCNC: 27 MG/DL
CALCIUM SERPL-MCNC: 8.9 MG/DL
CALCIUM SERPL-MCNC: 9.2 MG/DL
CHLORIDE SERPL-SCNC: 103 MMOL/L
CHLORIDE SERPL-SCNC: 108 MMOL/L
CO2 SERPL-SCNC: 22 MMOL/L
CO2 SERPL-SCNC: 24 MMOL/L
CREAT SERPL-MCNC: 1.03 MG/DL
CREAT SERPL-MCNC: 1.29 MG/DL
CYCLOSPORINE SER-MCNC: 240 NG/ML
CYCLOSPORINE SER-MCNC: 89 NG/ML
EGFR: 75 ML/MIN/1.73M2
EGFR: 98 ML/MIN/1.73M2
EOSINOPHIL # BLD AUTO: 0.02 K/UL — SIGNIFICANT CHANGE UP (ref 0–0.5)
EOSINOPHIL NFR BLD AUTO: 1 % — SIGNIFICANT CHANGE UP (ref 0–6)
GLUCOSE SERPL-MCNC: 118 MG/DL
GLUCOSE SERPL-MCNC: 131 MG/DL
HCT VFR BLD CALC: 24.6 % — LOW (ref 39–50)
HGB BLD-MCNC: 8.6 G/DL — LOW (ref 13–17)
LDH SERPL-CCNC: 2193 U/L
LYMPHOCYTES # BLD AUTO: 0.83 K/UL — LOW (ref 1–3.3)
LYMPHOCYTES # BLD AUTO: 38 % — SIGNIFICANT CHANGE UP (ref 13–44)
MACROCYTES BLD QL: SLIGHT — SIGNIFICANT CHANGE UP
MCHC RBC-ENTMCNC: 35.2 G/DL — SIGNIFICANT CHANGE UP (ref 32–36)
MCHC RBC-ENTMCNC: 35.5 PG — HIGH (ref 27–34)
MCV RBC AUTO: 101.7 FL — HIGH (ref 80–100)
MONOCYTES # BLD AUTO: 0.24 K/UL — SIGNIFICANT CHANGE UP (ref 0–0.9)
MONOCYTES NFR BLD AUTO: 11 % — SIGNIFICANT CHANGE UP (ref 2–14)
NEUTROPHILS # BLD AUTO: 1.1 K/UL — LOW (ref 1.8–7.4)
NEUTROPHILS NFR BLD AUTO: 50 % — SIGNIFICANT CHANGE UP (ref 43–77)
NRBC # BLD: 0 /100 — SIGNIFICANT CHANGE UP (ref 0–0)
NRBC # BLD: SIGNIFICANT CHANGE UP /100 WBCS (ref 0–0)
PLAT MORPH BLD: NORMAL — SIGNIFICANT CHANGE UP
PLATELET # BLD AUTO: 53 K/UL — LOW (ref 150–400)
POTASSIUM SERPL-SCNC: 4 MMOL/L
POTASSIUM SERPL-SCNC: 4.5 MMOL/L
PROT SERPL-MCNC: 6.9 G/DL
PROT SERPL-MCNC: 7.1 G/DL
RBC # BLD: 2.42 M/UL — LOW (ref 4.2–5.8)
RBC # FLD: 21.1 % — HIGH (ref 10.3–14.5)
RBC BLD AUTO: ABNORMAL
SODIUM SERPL-SCNC: 139 MMOL/L
SODIUM SERPL-SCNC: 140 MMOL/L
WBC # BLD: 2.19 K/UL — LOW (ref 3.8–10.5)
WBC # FLD AUTO: 2.19 K/UL — LOW (ref 3.8–10.5)

## 2022-07-28 ENCOUNTER — RESULT REVIEW (OUTPATIENT)
Age: 37
End: 2022-07-28

## 2022-07-28 ENCOUNTER — APPOINTMENT (OUTPATIENT)
Dept: HEMATOLOGY ONCOLOGY | Facility: CLINIC | Age: 37
End: 2022-07-28

## 2022-07-28 ENCOUNTER — OUTPATIENT (OUTPATIENT)
Dept: OUTPATIENT SERVICES | Facility: HOSPITAL | Age: 37
LOS: 1 days | End: 2022-07-28

## 2022-07-28 ENCOUNTER — LABORATORY RESULT (OUTPATIENT)
Age: 37
End: 2022-07-28

## 2022-07-28 ENCOUNTER — APPOINTMENT (OUTPATIENT)
Dept: ULTRASOUND IMAGING | Facility: CLINIC | Age: 37
End: 2022-07-28

## 2022-07-28 DIAGNOSIS — Z98.890 OTHER SPECIFIED POSTPROCEDURAL STATES: Chronic | ICD-10-CM

## 2022-07-28 DIAGNOSIS — D61.9 APLASTIC ANEMIA, UNSPECIFIED: ICD-10-CM

## 2022-07-28 LAB
BASOPHILS # BLD AUTO: 0 K/UL — SIGNIFICANT CHANGE UP (ref 0–0.2)
BASOPHILS NFR BLD AUTO: 0 % — SIGNIFICANT CHANGE UP (ref 0–2)
EOSINOPHIL # BLD AUTO: 0.01 K/UL — SIGNIFICANT CHANGE UP (ref 0–0.5)
EOSINOPHIL NFR BLD AUTO: 0.5 % — SIGNIFICANT CHANGE UP (ref 0–6)
HCT VFR BLD CALC: 25.3 % — LOW (ref 39–50)
HGB BLD-MCNC: 8.8 G/DL — LOW (ref 13–17)
IMM GRANULOCYTES NFR BLD AUTO: 0 % — SIGNIFICANT CHANGE UP (ref 0–1.5)
LYMPHOCYTES # BLD AUTO: 0.87 K/UL — LOW (ref 1–3.3)
LYMPHOCYTES # BLD AUTO: 45.1 % — HIGH (ref 13–44)
MCHC RBC-ENTMCNC: 34.8 G/DL — SIGNIFICANT CHANGE UP (ref 32–36)
MCHC RBC-ENTMCNC: 34.8 PG — HIGH (ref 27–34)
MCV RBC AUTO: 100 FL — SIGNIFICANT CHANGE UP (ref 80–100)
MONOCYTES # BLD AUTO: 0.28 K/UL — SIGNIFICANT CHANGE UP (ref 0–0.9)
MONOCYTES NFR BLD AUTO: 14.5 % — HIGH (ref 2–14)
NEUTROPHILS # BLD AUTO: 0.77 K/UL — LOW (ref 1.8–7.4)
NEUTROPHILS NFR BLD AUTO: 39.9 % — LOW (ref 43–77)
NRBC # BLD: 0 /100 WBCS — SIGNIFICANT CHANGE UP (ref 0–0)
PLATELET # BLD AUTO: 43 K/UL — LOW (ref 150–400)
RBC # BLD: 2.53 M/UL — LOW (ref 4.2–5.8)
RBC # FLD: 18.5 % — HIGH (ref 10.3–14.5)
WBC # BLD: 1.93 K/UL — LOW (ref 3.8–10.5)
WBC # FLD AUTO: 1.93 K/UL — LOW (ref 3.8–10.5)

## 2022-07-28 PROCEDURE — 93975 VASCULAR STUDY: CPT | Mod: 26

## 2022-07-29 LAB
ALBUMIN SERPL ELPH-MCNC: 5 G/DL
ALP BLD-CCNC: 73 U/L
ALT SERPL-CCNC: 34 U/L
ANION GAP SERPL CALC-SCNC: 12 MMOL/L
AST SERPL-CCNC: 59 U/L
BILIRUB INDIRECT SERPL-MCNC: 2.4 MG/DL
BILIRUB SERPL-MCNC: 2.9 MG/DL
BUN SERPL-MCNC: 20 MG/DL
CALCIUM SERPL-MCNC: 9.6 MG/DL
CHLORIDE SERPL-SCNC: 105 MMOL/L
CO2 SERPL-SCNC: 25 MMOL/L
CREAT SERPL-MCNC: 1.06 MG/DL
EGFR: 94 ML/MIN/1.73M2
GLUCOSE SERPL-MCNC: 97 MG/DL
LDH SERPL-CCNC: 1907 U/L
POTASSIUM SERPL-SCNC: 4.5 MMOL/L
PROT SERPL-MCNC: 7.5 G/DL
SODIUM SERPL-SCNC: 142 MMOL/L

## 2022-08-01 ENCOUNTER — LABORATORY RESULT (OUTPATIENT)
Age: 37
End: 2022-08-01

## 2022-08-02 ENCOUNTER — APPOINTMENT (OUTPATIENT)
Dept: HEMATOLOGY ONCOLOGY | Facility: CLINIC | Age: 37
End: 2022-08-02
Payer: COMMERCIAL

## 2022-08-02 ENCOUNTER — RESULT REVIEW (OUTPATIENT)
Age: 37
End: 2022-08-02

## 2022-08-02 ENCOUNTER — LABORATORY RESULT (OUTPATIENT)
Age: 37
End: 2022-08-02

## 2022-08-02 VITALS
OXYGEN SATURATION: 98 % | DIASTOLIC BLOOD PRESSURE: 89 MMHG | HEART RATE: 97 BPM | BODY MASS INDEX: 34.55 KG/M2 | HEIGHT: 68.11 IN | WEIGHT: 227.96 LBS | RESPIRATION RATE: 16 BRPM | TEMPERATURE: 97.2 F | SYSTOLIC BLOOD PRESSURE: 132 MMHG

## 2022-08-02 LAB
ANISOCYTOSIS BLD QL: SLIGHT — SIGNIFICANT CHANGE UP
BASOPHILS # BLD AUTO: 0 K/UL — SIGNIFICANT CHANGE UP (ref 0–0.2)
BASOPHILS NFR BLD AUTO: 0 % — SIGNIFICANT CHANGE UP (ref 0–2)
DACRYOCYTES BLD QL SMEAR: SLIGHT — SIGNIFICANT CHANGE UP
EOSINOPHIL # BLD AUTO: 0.04 K/UL — SIGNIFICANT CHANGE UP (ref 0–0.5)
EOSINOPHIL NFR BLD AUTO: 2 % — SIGNIFICANT CHANGE UP (ref 0–6)
HCT VFR BLD CALC: 23.6 % — LOW (ref 39–50)
HGB BLD-MCNC: 8.3 G/DL — LOW (ref 13–17)
LYMPHOCYTES # BLD AUTO: 0.82 K/UL — LOW (ref 1–3.3)
LYMPHOCYTES # BLD AUTO: 38 % — SIGNIFICANT CHANGE UP (ref 13–44)
MACROCYTES BLD QL: SLIGHT — SIGNIFICANT CHANGE UP
MCHC RBC-ENTMCNC: 35.2 G/DL — SIGNIFICANT CHANGE UP (ref 32–36)
MCHC RBC-ENTMCNC: 35.6 PG — HIGH (ref 27–34)
MCV RBC AUTO: 101.3 FL — HIGH (ref 80–100)
MONOCYTES # BLD AUTO: 0.24 K/UL — SIGNIFICANT CHANGE UP (ref 0–0.9)
MONOCYTES NFR BLD AUTO: 11 % — SIGNIFICANT CHANGE UP (ref 2–14)
NEUTROPHILS # BLD AUTO: 1.06 K/UL — LOW (ref 1.8–7.4)
NEUTROPHILS NFR BLD AUTO: 49 % — SIGNIFICANT CHANGE UP (ref 43–77)
NRBC # BLD: 0 /100 — SIGNIFICANT CHANGE UP (ref 0–0)
NRBC # BLD: SIGNIFICANT CHANGE UP /100 WBCS (ref 0–0)
OVALOCYTES BLD QL SMEAR: SLIGHT — SIGNIFICANT CHANGE UP
PLAT MORPH BLD: NORMAL — SIGNIFICANT CHANGE UP
PLATELET # BLD AUTO: 44 K/UL — LOW (ref 150–400)
POIKILOCYTOSIS BLD QL AUTO: SLIGHT — SIGNIFICANT CHANGE UP
RBC # BLD: 2.33 M/UL — LOW (ref 4.2–5.8)
RBC # FLD: 18.3 % — HIGH (ref 10.3–14.5)
RBC BLD AUTO: ABNORMAL
WBC # BLD: 2.17 K/UL — LOW (ref 3.8–10.5)
WBC # FLD AUTO: 2.17 K/UL — LOW (ref 3.8–10.5)

## 2022-08-02 PROCEDURE — 99215 OFFICE O/P EST HI 40 MIN: CPT | Mod: 25

## 2022-08-02 PROCEDURE — 38221 DX BONE MARROW BIOPSIES: CPT

## 2022-08-02 NOTE — REASON FOR VISIT
[Bone Marrow Biopsy] : bone marrow biopsy [Bone Marrow Aspiration] : bone marrow aspiration [FreeTextEntry2] : 37 yo male with PMHx of relapsed aplastic anemia s/p tx with rATG+CSA+promacta with continued cytopenias

## 2022-08-02 NOTE — PROCEDURE
[Bone Marrow Biopsy] : bone marrow biopsy [Bone Marrow Aspiration] : bone marrow aspiration  [Patient] : the patient [Verbal Consent Obtained] : verbal consent was obtained prior to the procedure [Patient identification verified] : patient identification verified [Procedure verified and consent obtained] : procedure verified and consent obtained [Laterality verified and correct site marked] : laterality verified and correct site marked [Right] : site: right [Correct positioning] : correct positioning [Prone] : prone [Superior iliac spine was identified] : the superior iliac spine was identified. [The right posterior iliac crest was prepped with betadine and draped, using sterile technique.] : The right posterior iliac crest was prepped with betadine and draped, using sterile technique. [Lidocaine was injected and into the periosteum overlying the site.] : Lidocaine was injected and into the periosteum overlying the site. [Aspirate] : aspirate [Cytogenetics] : cytogenetics [FISH] : FISH [Biopsy] : biopsy [Flow Cytometry] : flow cytometry [] : The patient was instructed to remove the bandage the following AM. The patient may bathe. Acetaminophen may be taken for discomfort, as per package directions.If there are any other problems, the patient was instructed to call the office. The patient verbalized understanding, and is aware of the office contact numbers. [FreeTextEntry1] : 37 yo male with PMHx of relapsed aplastic anemia s/p tx with rATG+CSA+promacta with continued cytopenias [FreeTextEntry2] : WBC: 2.17\par Hgb: 8.3\par PLT: 44K\par \par Bone marrow biopsy and aspiration completed. Patient completed without difficulty.

## 2022-08-05 ENCOUNTER — RESULT REVIEW (OUTPATIENT)
Age: 37
End: 2022-08-05

## 2022-08-05 ENCOUNTER — LABORATORY RESULT (OUTPATIENT)
Age: 37
End: 2022-08-05

## 2022-08-05 ENCOUNTER — APPOINTMENT (OUTPATIENT)
Dept: INFUSION THERAPY | Facility: HOSPITAL | Age: 37
End: 2022-08-05

## 2022-08-05 ENCOUNTER — APPOINTMENT (OUTPATIENT)
Dept: HEMATOLOGY ONCOLOGY | Facility: CLINIC | Age: 37
End: 2022-08-05

## 2022-08-05 VITALS
TEMPERATURE: 97.2 F | WEIGHT: 227.95 LBS | RESPIRATION RATE: 16 BRPM | DIASTOLIC BLOOD PRESSURE: 89 MMHG | HEART RATE: 97 BPM | SYSTOLIC BLOOD PRESSURE: 132 MMHG | BODY MASS INDEX: 34.55 KG/M2 | OXYGEN SATURATION: 98 % | HEIGHT: 68.11 IN

## 2022-08-05 LAB
ALBUMIN SERPL ELPH-MCNC: 4.7 G/DL
ALBUMIN SERPL ELPH-MCNC: 5 G/DL
ALP BLD-CCNC: 69 U/L
ALP BLD-CCNC: 72 U/L
ALT SERPL-CCNC: 27 U/L
ALT SERPL-CCNC: 29 U/L
ANION GAP SERPL CALC-SCNC: 10 MMOL/L
ANION GAP SERPL CALC-SCNC: 12 MMOL/L
AST SERPL-CCNC: 62 U/L
AST SERPL-CCNC: 76 U/L
BASOPHILS # BLD AUTO: 0.01 K/UL — SIGNIFICANT CHANGE UP (ref 0–0.2)
BASOPHILS NFR BLD AUTO: 0.4 % — SIGNIFICANT CHANGE UP (ref 0–2)
BILIRUB INDIRECT SERPL-MCNC: 1.9 MG/DL
BILIRUB INDIRECT SERPL-MCNC: 3.2 MG/DL
BILIRUB SERPL-MCNC: 2.4 MG/DL
BILIRUB SERPL-MCNC: 3.8 MG/DL
BUN SERPL-MCNC: 25 MG/DL
BUN SERPL-MCNC: 31 MG/DL
CALCIUM SERPL-MCNC: 9.2 MG/DL
CALCIUM SERPL-MCNC: 9.7 MG/DL
CHLORIDE SERPL-SCNC: 102 MMOL/L
CHLORIDE SERPL-SCNC: 107 MMOL/L
CO2 SERPL-SCNC: 24 MMOL/L
CO2 SERPL-SCNC: 24 MMOL/L
CREAT SERPL-MCNC: 1.21 MG/DL
CREAT SERPL-MCNC: 1.61 MG/DL
CYCLOSPORINE SER-MCNC: 202 NG/ML
CYCLOSPORINE SER-MCNC: 227 NG/ML
EGFR: 57 ML/MIN/1.73M2
EGFR: 81 ML/MIN/1.73M2
EOSINOPHIL # BLD AUTO: 0.03 K/UL — SIGNIFICANT CHANGE UP (ref 0–0.5)
EOSINOPHIL NFR BLD AUTO: 1.3 % — SIGNIFICANT CHANGE UP (ref 0–6)
GLUCOSE SERPL-MCNC: 101 MG/DL
GLUCOSE SERPL-MCNC: 99 MG/DL
HCT VFR BLD CALC: 22.7 % — LOW (ref 39–50)
HGB BLD-MCNC: 7.9 G/DL — LOW (ref 13–17)
IMM GRANULOCYTES NFR BLD AUTO: 0 % — SIGNIFICANT CHANGE UP (ref 0–1.5)
LDH SERPL-CCNC: 1693 U/L
LYMPHOCYTES # BLD AUTO: 1.1 K/UL — SIGNIFICANT CHANGE UP (ref 1–3.3)
LYMPHOCYTES # BLD AUTO: 46.4 % — HIGH (ref 13–44)
MCHC RBC-ENTMCNC: 34.8 G/DL — SIGNIFICANT CHANGE UP (ref 32–36)
MCHC RBC-ENTMCNC: 35.9 PG — HIGH (ref 27–34)
MCV RBC AUTO: 103.2 FL — HIGH (ref 80–100)
MONOCYTES # BLD AUTO: 0.33 K/UL — SIGNIFICANT CHANGE UP (ref 0–0.9)
MONOCYTES NFR BLD AUTO: 13.9 % — SIGNIFICANT CHANGE UP (ref 2–14)
NEUTROPHILS # BLD AUTO: 0.9 K/UL — LOW (ref 1.8–7.4)
NEUTROPHILS NFR BLD AUTO: 38 % — LOW (ref 43–77)
NRBC # BLD: 0 /100 WBCS — SIGNIFICANT CHANGE UP (ref 0–0)
PLATELET # BLD AUTO: 37 K/UL — LOW (ref 150–400)
POTASSIUM SERPL-SCNC: 4.6 MMOL/L
POTASSIUM SERPL-SCNC: 4.6 MMOL/L
PROT SERPL-MCNC: 7.1 G/DL
PROT SERPL-MCNC: 7.5 G/DL
RBC # BLD: 2.2 M/UL — LOW (ref 4.2–5.8)
RBC # FLD: 18.6 % — HIGH (ref 10.3–14.5)
SODIUM SERPL-SCNC: 138 MMOL/L
SODIUM SERPL-SCNC: 142 MMOL/L
WBC # BLD: 2.37 K/UL — LOW (ref 3.8–10.5)
WBC # FLD AUTO: 2.37 K/UL — LOW (ref 3.8–10.5)

## 2022-08-05 NOTE — REVIEW OF SYSTEMS
[Fatigue] : fatigue [Palpitations] : palpitations [SOB on Exertion] : shortness of breath during exertion [Negative] : Heme/Lymph [Fever] : no fever [Chills] : no chills [Night Sweats] : no night sweats [Recent Change In Weight] : ~T no recent weight change [Vision Problems] : no vision problems [Dysphagia] : no dysphagia [Nosebleeds] : no nosebleeds [Odynophagia] : no odynophagia [Chest Pain] : no chest pain [Lower Ext Edema] : no lower extremity edema [Wheezing] : no wheezing [Cough] : no cough [Abdominal Pain] : no abdominal pain [Vomiting] : no vomiting [Constipation] : no constipation [Diarrhea] : no diarrhea [Muscle Weakness] : no muscle weakness [Insomnia] : no insomnia [Anxiety] : no anxiety [Hot Flashes] : no hot flashes [FreeTextEntry7] : nausea

## 2022-08-05 NOTE — PHYSICAL EXAM
[Fully active, able to carry on all pre-disease performance without restriction] : Status 0 - Fully active, able to carry on all pre-disease performance without restriction [Obese] : obese [Normal] : affect appropriate [de-identified] : JEANETH [de-identified] : supple [de-identified] : (+) S1S2 regular, tachy [de-identified] : no edema  [de-identified] : no tenderness [de-identified] : ROM intact [de-identified] : warm/dry [de-identified] : A&Ox3

## 2022-08-05 NOTE — ASSESSMENT
[FreeTextEntry1] : 35 y/o M presented April 2020 with aplastic anemia (very severe AA) confirmed on bone marrow biopsy at [facility] and now s/p inpatient treatment beginning 4/23/2020 with horse ATG + CsA + promacta with complete remission. Bone marrow bx on 11/9/20 revealed hypocellular marrow, overall cellularity improved (20-50%) showing marrow regeneration. BMBx on 5/18/2021 showing normal morphology with normal cellularity. Patient with worsening cytopenias in November 2021 and with neutropenic fever as well, sent inpatient, s/p treatment with rabbit ATG, prednisone, cyclosporine and Promacta, here for follow up. \par \par -Started Rabbit ATG on 12/27\par -Patient remains pancytopenic requiring intermittent transfusions.\par -Still requiring transfusions, and persistently jaundiced and with elevated bilirubin. In early 2021 thought likely Promacta related. Patient discharged on 300 mg daily Promacta, had been taking 150 mg inpatient. Was decreased back to 150 mg daily. Still with mildly elevated tbili which has not resolved. Abd US showed cholelithiasis without evidence of acute cholecystitis. Decreased Promacta to 75 mg daily, and ultimately determined more likely due to hemolysis. 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 \par -Repeat abdominal US with doppler - ruled out portal vein thrombosis. \par -Patient with recent ANSON likely related to recent supratherapeutic cyclosporine levels. This had resolved and stabilized. Cr again rising although cyclosporine level in 230s - will decrease cyclosporine A dose to 200 mg BID (total 100 mg decrease) and check level and creatinine this Friday. 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 BMBx done today and will follow up results. If confirmed will plan for eculizumab therapy. \par -Will need to plan for meningitis vaccine\par -Patient understands and agrees with plan. All information explained to the best of my ability.\par - RTC in 1 week

## 2022-08-05 NOTE — HISTORY OF PRESENT ILLNESS
[Disease:__________________________] : Disease: [unfilled] [Therapy: ___] : Therapy: [unfilled] [de-identified] : 37 y/o M with no PMH presented to OSH originally for c/o dizziness and feeling unwell and was then transferred to [facility] for further hematologic evaluation as patient was found to be severely pancytopenic. Patient first started noticing scattered bruising throughout his body (mostly thighs and arms) about 6 weeks prior to presentation, and also had epistaxis. Pt had also noticed intermittent fevers and chills, responsive to tylenol, and noticed very dark stools for the same time period. Also with spontaneous gum bleeding and endorsed severe fatigue and SOB with exertion. Also noted 15 lbs weight loss, partially intentional over past 2-3 months prior to presentation.  Also c/o mild headache located in posterior aspect of his head, denies sore throat, cough, +nausea but no vomiting, no abdominal pain, no diarrhea, no BRBPR, no urinary symptoms, no LE swelling, +visual changes.     \par \par Patient was transferred to [facility] from [facility] on 4/18. Patient was started on IVF, Levaquin, and allopurinol. Patient transfused blood and platelets. Bone Marrow biopsy performed on 4/20 consistent with Aplastic Anemia. 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 Because of this, patient's platelet transfusion threshold was made 50k. Horse ATG test dose was applied 4/22 with no reaction. Treatment started 4/23 consisting of equine ATG at 40mg/kg/day x 4 days, Cyclosporine 10mg/kg/day divided doses BID, Eltrombopag 150mg daily. Patient had episode of hives with ATG infusion on Day 1 thus steroid regimen was changed from oral prednisone to IV Solumedrol Q8 for the duration of ATG treatment and transitioned back to oral prednisone 1mg/kg daily for days 5-10 then tapered. The patient had grade 3 transaminitis likely from Promacta and ATG and Promacta was held as of 5/1. While patient was in the hospital, his course was also complicated by epigastric pain consistent with dyspepsia, most likely due to cyclosporine. Was managed on pepcid BID and sucralfate q6 hours and mylanta as needed with good relief. \par \par On 5/12 the patient's transaminitis had improved(grade 2 total bili, grade 1 AST, grade 2 ALT) and Promacta 150 mg po QD was resumed. Pt's cyclosporine dose was adjusted  according to the level (goal 200-400) and he was on 400 mg po q12h as of 5/18. On 5/16, patient developed chest discomfort during platelet transfusion which improved with benadryl. EKG and CXR were wnl. Patient originally refused benadryl pre-medication prior to platelets that day due to side effects with taking Benadryl, and it was decided to premedicate pt with Claritin or Zyrtec pre blood transfusion instead. On 5/20, cyclosporine level was 460, therefore dose was lowered to 300 mg PO BID. Patient's vision had improved, so platelet transfusion threshold liberalized to 20k. \par \par Patient has since had a complete recovery of blood counts. He discontinued Promacta after 6 months of therapy. He has been monitored since then wit stable CBC and therapeutic cyclosporine levels. \par \par He has since had repeat bone marrow biopsy on 5/18/2021 with normal morphology and cellularity\par \par In September 2021 noted his platelets dropping, and ultimately slowly was becoming pancytopenia. He was restarted on cyclosporine at home, but ultimately was admitted in late December with neutropenic fever. Bone marrow biopsy was repeated on 12/2021 and showed decreased megakaryocytes and other findings consistent with relapse aplastic anemia. Patient was transferred to [address] [place] [address] on 12/23 to start treatment for relapse with rabbit ATG, cyclosporine, prednisone, and eltrombopag. On 12/27 rabbit ATG test dose was given with no adverse reaction. Cyclosporine was started evening of 12/26. Rabbit ATG started 12/29, patient with reaction upon increase in flow rate with fevers, rigors. Patient re-challenged 12/30, again had reaction after completion of Day 1 dose. ATG was subsequently held, but then restarted on 1/3 and completed remaining 5 day course on 1/6. Cyclosporine levels were checked biweekly, dose adjusted as needed based on level. Ultimately was discharged on 450 mg BID cyclosporine. Patient was seen by ENT for sinusitis and treated with Unasyn thru 1/19. Patient remained neutropenic and started ppx w/ levaquin on 1/20. [de-identified] : - Markedly hypocellular bone marrow (mostly 5% and focal 10-\par 15%) with focal minimal erythropoiesis, minimal to absent\par myelopoiesis, absent megakaryocytes, increased iron stores. Aplastic bone marrow.  [de-identified] : Cytogenetics: \par \par Result: Male karyotype\par Karyotype: 46,XY                         {12                         } [de-identified] : very severe Aplastic Anemia [FreeTextEntry1] : Therapy initiated 4/23/2020 [de-identified] : Patient presented for follow up on 8/2/22. Patient reports nausea.  Reports no febrile illnesses. No headaches, light-headedness No weight loss or night sweats. No chest pain, palpitations or shortness of breath. Denies any V/D. No pain symptoms at the present time. Normal bowel movements and normal urinary habits. Patient reports a good appetite at this time. Bilirubin is persistently elevated as are all other hemolytic markers. Patient with rising PNH clone, with BMBx done today.

## 2022-08-08 DIAGNOSIS — R11.2 NAUSEA WITH VOMITING, UNSPECIFIED: ICD-10-CM

## 2022-08-08 DIAGNOSIS — Z51.89 ENCOUNTER FOR OTHER SPECIFIED AFTERCARE: ICD-10-CM

## 2022-08-09 ENCOUNTER — RESULT REVIEW (OUTPATIENT)
Age: 37
End: 2022-08-09

## 2022-08-09 ENCOUNTER — APPOINTMENT (OUTPATIENT)
Dept: INFUSION THERAPY | Facility: HOSPITAL | Age: 37
End: 2022-08-09

## 2022-08-09 ENCOUNTER — NON-APPOINTMENT (OUTPATIENT)
Age: 37
End: 2022-08-09

## 2022-08-09 ENCOUNTER — APPOINTMENT (OUTPATIENT)
Dept: HEMATOLOGY ONCOLOGY | Facility: CLINIC | Age: 37
End: 2022-08-09

## 2022-08-09 ENCOUNTER — LABORATORY RESULT (OUTPATIENT)
Age: 37
End: 2022-08-09

## 2022-08-09 VITALS
SYSTOLIC BLOOD PRESSURE: 122 MMHG | BODY MASS INDEX: 35.08 KG/M2 | HEART RATE: 93 BPM | TEMPERATURE: 97.3 F | OXYGEN SATURATION: 96 % | WEIGHT: 231.48 LBS | RESPIRATION RATE: 17 BRPM | DIASTOLIC BLOOD PRESSURE: 73 MMHG

## 2022-08-09 LAB
BASOPHILS # BLD AUTO: 0 K/UL — SIGNIFICANT CHANGE UP (ref 0–0.2)
BASOPHILS NFR BLD AUTO: 0 % — SIGNIFICANT CHANGE UP (ref 0–2)
EOSINOPHIL # BLD AUTO: 0.03 K/UL — SIGNIFICANT CHANGE UP (ref 0–0.5)
EOSINOPHIL NFR BLD AUTO: 1.1 % — SIGNIFICANT CHANGE UP (ref 0–6)
HCT VFR BLD CALC: 24.2 % — LOW (ref 39–50)
HGB BLD-MCNC: 8.4 G/DL — LOW (ref 13–17)
IMM GRANULOCYTES NFR BLD AUTO: 1.1 % — SIGNIFICANT CHANGE UP (ref 0–1.5)
LYMPHOCYTES # BLD AUTO: 1.11 K/UL — SIGNIFICANT CHANGE UP (ref 1–3.3)
LYMPHOCYTES # BLD AUTO: 38.9 % — SIGNIFICANT CHANGE UP (ref 13–44)
MCHC RBC-ENTMCNC: 34.1 PG — HIGH (ref 27–34)
MCHC RBC-ENTMCNC: 34.7 G/DL — SIGNIFICANT CHANGE UP (ref 32–36)
MCV RBC AUTO: 98.4 FL — SIGNIFICANT CHANGE UP (ref 80–100)
MONOCYTES # BLD AUTO: 0.36 K/UL — SIGNIFICANT CHANGE UP (ref 0–0.9)
MONOCYTES NFR BLD AUTO: 12.6 % — SIGNIFICANT CHANGE UP (ref 2–14)
NEUTROPHILS # BLD AUTO: 1.32 K/UL — LOW (ref 1.8–7.4)
NEUTROPHILS NFR BLD AUTO: 46.3 % — SIGNIFICANT CHANGE UP (ref 43–77)
NRBC # BLD: 0 /100 WBCS — SIGNIFICANT CHANGE UP (ref 0–0)
PLATELET # BLD AUTO: 49 K/UL — LOW (ref 150–400)
RBC # BLD: 2.46 M/UL — LOW (ref 4.2–5.8)
RBC # FLD: 20.2 % — HIGH (ref 10.3–14.5)
WBC # BLD: 2.85 K/UL — LOW (ref 3.8–10.5)
WBC # FLD AUTO: 2.85 K/UL — LOW (ref 3.8–10.5)

## 2022-08-09 PROCEDURE — 99214 OFFICE O/P EST MOD 30 MIN: CPT

## 2022-08-09 PROCEDURE — 90734 MENACWYD/MENACWYCRM VACC IM: CPT

## 2022-08-09 PROCEDURE — 90471 IMMUNIZATION ADMIN: CPT

## 2022-08-09 NOTE — PHYSICAL EXAM
[Fully active, able to carry on all pre-disease performance without restriction] : Status 0 - Fully active, able to carry on all pre-disease performance without restriction [Obese] : obese [Normal] : affect appropriate [de-identified] : JEANETH [de-identified] : supple [de-identified] : (+) S1S2 regular, tachy [de-identified] : no edema  [de-identified] : no tenderness [de-identified] : ROM intact [de-identified] : warm/dry [de-identified] : A&Ox3

## 2022-08-09 NOTE — ASSESSMENT
[FreeTextEntry1] : 35 y/o M presented April 2020 with aplastic anemia (very severe AA) confirmed on bone marrow biopsy at [facility] and now s/p inpatient treatment beginning 4/23/2020 with horse ATG + CsA + promacta with complete remission. Bone marrow bx on 11/9/20 revealed hypocellular marrow, overall cellularity improved (20-50%) showing marrow regeneration. BMBx on 5/18/2021 showing normal morphology with normal cellularity. Patient with worsening cytopenias in November 2021 and with neutropenic fever as well, sent inpatient, s/p treatment with rabbit ATG, prednisone, cyclosporine and Promacta, here for follow up. \par \par -Started Rabbit ATG on 12/27\par -Patient remains pancytopenic requiring intermittent transfusions.\par -Still requiring transfusions, and persistently jaundiced and with elevated bilirubin. In early 2021 thought likely Promacta related. Patient discharged on 300 mg daily Promacta, had been taking 150 mg inpatient. Was decreased back to 150 mg daily. Still with mildly elevated tbili which has not resolved. Abd US showed cholelithiasis without evidence of acute cholecystitis. Decreased Promacta to 75 mg daily, and ultimately determined more likely due to hemolysis. 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 \par -Repeat abdominal US with doppler - ruled out portal vein thrombosis. \par -Patient with recent ANSON likely related to recent supratherapeutic cyclosporine levels. This had resolved and stabilized. Cr again rising although cyclosporine level in 230s - will decrease cyclosporine A dose to 200 mg BID (total 100 mg decrease) and check level and creatinine this Friday. 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 BMBx done last week, awaiting for results. If confirmed will plan for eculizumab therapy. \par -Meningitis vaccine series starting today.\par -Patient understands and agrees with plan. All information explained to the best of my ability.\par - RTC in 2 weeks - Patient travelling to [place] to see family for 5 days. Will send him with script for bloodwork while he's there which will be faxed to me.   yes

## 2022-08-09 NOTE — HISTORY OF PRESENT ILLNESS
[Disease:__________________________] : Disease: [unfilled] [Therapy: ___] : Therapy: [unfilled] [de-identified] : 37 y/o M with no PMH presented to OSH originally for c/o dizziness and feeling unwell and was then transferred to [facility] for further hematologic evaluation as patient was found to be severely pancytopenic. Patient first started noticing scattered bruising throughout his body (mostly thighs and arms) about 6 weeks prior to presentation, and also had epistaxis. Pt had also noticed intermittent fevers and chills, responsive to tylenol, and noticed very dark stools for the same time period. Also with spontaneous gum bleeding and endorsed severe fatigue and SOB with exertion. Also noted 15 lbs weight loss, partially intentional over past 2-3 months prior to presentation.  Also c/o mild headache located in posterior aspect of his head, denies sore throat, cough, +nausea but no vomiting, no abdominal pain, no diarrhea, no BRBPR, no urinary symptoms, no LE swelling, +visual changes.     \par \par Patient was transferred to [facility] from [facility] on 4/18. Patient was started on IVF, Levaquin, and allopurinol. Patient transfused blood and platelets. Bone Marrow biopsy performed on 4/20 consistent with Aplastic Anemia. 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 Because of this, patient's platelet transfusion threshold was made 50k. Horse ATG test dose was applied 4/22 with no reaction. Treatment started 4/23 consisting of equine ATG at 40mg/kg/day x 4 days, Cyclosporine 10mg/kg/day divided doses BID, Eltrombopag 150mg daily. Patient had episode of hives with ATG infusion on Day 1 thus steroid regimen was changed from oral prednisone to IV Solumedrol Q8 for the duration of ATG treatment and transitioned back to oral prednisone 1mg/kg daily for days 5-10 then tapered. The patient had grade 3 transaminitis likely from Promacta and ATG and Promacta was held as of 5/1. While patient was in the hospital, his course was also complicated by epigastric pain consistent with dyspepsia, most likely due to cyclosporine. Was managed on pepcid BID and sucralfate q6 hours and mylanta as needed with good relief. \par \par On 5/12 the patient's transaminitis had improved(grade 2 total bili, grade 1 AST, grade 2 ALT) and Promacta 150 mg po QD was resumed. Pt's cyclosporine dose was adjusted  according to the level (goal 200-400) and he was on 400 mg po q12h as of 5/18. On 5/16, patient developed chest discomfort during platelet transfusion which improved with benadryl. EKG and CXR were wnl. Patient originally refused benadryl pre-medication prior to platelets that day due to side effects with taking Benadryl, and it was decided to premedicate pt with Claritin or Zyrtec pre blood transfusion instead. On 5/20, cyclosporine level was 460, therefore dose was lowered to 300 mg PO BID. Patient's vision had improved, so platelet transfusion threshold liberalized to 20k. \par \par Patient has since had a complete recovery of blood counts. He discontinued Promacta after 6 months of therapy. He has been monitored since then wit stable CBC and therapeutic cyclosporine levels. \par \par He has since had repeat bone marrow biopsy on 5/18/2021 with normal morphology and cellularity\par \par In September 2021 noted his platelets dropping, and ultimately slowly was becoming pancytopenia. He was restarted on cyclosporine at home, but ultimately was admitted in late December with neutropenic fever. Bone marrow biopsy was repeated on 12/2021 and showed decreased megakaryocytes and other findings consistent with relapse aplastic anemia. Patient was transferred to [address] [place] [address] on 12/23 to start treatment for relapse with rabbit ATG, cyclosporine, prednisone, and eltrombopag. On 12/27 rabbit ATG test dose was given with no adverse reaction. Cyclosporine was started evening of 12/26. Rabbit ATG started 12/29, patient with reaction upon increase in flow rate with fevers, rigors. Patient re-challenged 12/30, again had reaction after completion of Day 1 dose. ATG was subsequently held, but then restarted on 1/3 and completed remaining 5 day course on 1/6. Cyclosporine levels were checked biweekly, dose adjusted as needed based on level. Ultimately was discharged on 450 mg BID cyclosporine. Patient was seen by ENT for sinusitis and treated with Unasyn thru 1/19. Patient remained neutropenic and started ppx w/ levaquin on 1/20. [de-identified] : - Markedly hypocellular bone marrow (mostly 5% and focal 10-\par 15%) with focal minimal erythropoiesis, minimal to absent\par myelopoiesis, absent megakaryocytes, increased iron stores. Aplastic bone marrow.  [de-identified] : Cytogenetics: \par \par Result: Male karyotype\par Karyotype: 46,XY                          {12                          } [de-identified] : very severe Aplastic Anemia [FreeTextEntry1] : Therapy initiated 4/23/2020 [de-identified] : Patient presented for follow up on 8/2/22. Patient reports nausea.  Reports no febrile illnesses. No headaches, light-headedness No weight loss or night sweats. No chest pain, palpitations or shortness of breath. Denies any V/D. No pain symptoms at the present time. Normal bowel movements and normal urinary habits. Patient reports a good appetite at this time. Bilirubin is persistently elevated as are all other hemolytic markers. Patient with rising PNH clone, with BMBx done that day. Patient again presented for follow up on 8/09/22. Patient with no complaints today. He is scheduled for meningococcal vaccine today. No pain symptoms at the present time. Bilirubin is persistently elevated as are all other hemolytic markers but less so. Stable.

## 2022-08-11 LAB
ALBUMIN SERPL ELPH-MCNC: 4.7 G/DL
ALP BLD-CCNC: 74 U/L
ALT SERPL-CCNC: 30 U/L
ANION GAP SERPL CALC-SCNC: 12 MMOL/L
AST SERPL-CCNC: 56 U/L
BILIRUB INDIRECT SERPL-MCNC: 1.7 MG/DL
BILIRUB SERPL-MCNC: 2.2 MG/DL
BUN SERPL-MCNC: 17 MG/DL
CALCIUM SERPL-MCNC: 9 MG/DL
CHLORIDE SERPL-SCNC: 105 MMOL/L
CO2 SERPL-SCNC: 21 MMOL/L
CREAT SERPL-MCNC: 1 MG/DL
CYCLOSPORINE SER-MCNC: 929 NG/ML
EGFR: 101 ML/MIN/1.73M2
GLUCOSE SERPL-MCNC: 99 MG/DL
LDH SERPL-CCNC: 1551 U/L
POTASSIUM SERPL-SCNC: 4.2 MMOL/L
PROT SERPL-MCNC: 7 G/DL
SODIUM SERPL-SCNC: 137 MMOL/L

## 2022-08-18 ENCOUNTER — RESULT REVIEW (OUTPATIENT)
Age: 37
End: 2022-08-18

## 2022-08-18 ENCOUNTER — APPOINTMENT (OUTPATIENT)
Dept: HEMATOLOGY ONCOLOGY | Facility: CLINIC | Age: 37
End: 2022-08-18

## 2022-08-18 VITALS
HEART RATE: 100 BPM | SYSTOLIC BLOOD PRESSURE: 122 MMHG | TEMPERATURE: 98.2 F | BODY MASS INDEX: 35.25 KG/M2 | RESPIRATION RATE: 16 BRPM | WEIGHT: 232.59 LBS | OXYGEN SATURATION: 98 % | DIASTOLIC BLOOD PRESSURE: 80 MMHG

## 2022-08-18 LAB
BASOPHILS # BLD AUTO: 0.02 K/UL — SIGNIFICANT CHANGE UP (ref 0–0.2)
BASOPHILS NFR BLD AUTO: 0.6 % — SIGNIFICANT CHANGE UP (ref 0–2)
EOSINOPHIL # BLD AUTO: 0.08 K/UL — SIGNIFICANT CHANGE UP (ref 0–0.5)
EOSINOPHIL NFR BLD AUTO: 2.4 % — SIGNIFICANT CHANGE UP (ref 0–6)
HCT VFR BLD CALC: 22.7 % — LOW (ref 39–50)
HGB BLD-MCNC: 8 G/DL — LOW (ref 13–17)
IMM GRANULOCYTES NFR BLD AUTO: 2.4 % — HIGH (ref 0–1.5)
LYMPHOCYTES # BLD AUTO: 0.95 K/UL — LOW (ref 1–3.3)
LYMPHOCYTES # BLD AUTO: 29.1 % — SIGNIFICANT CHANGE UP (ref 13–44)
MCHC RBC-ENTMCNC: 35.2 G/DL — SIGNIFICANT CHANGE UP (ref 32–36)
MCHC RBC-ENTMCNC: 35.6 PG — HIGH (ref 27–34)
MCV RBC AUTO: 100.9 FL — HIGH (ref 80–100)
MONOCYTES # BLD AUTO: 0.36 K/UL — SIGNIFICANT CHANGE UP (ref 0–0.9)
MONOCYTES NFR BLD AUTO: 11 % — SIGNIFICANT CHANGE UP (ref 2–14)
NEUTROPHILS # BLD AUTO: 1.78 K/UL — LOW (ref 1.8–7.4)
NEUTROPHILS NFR BLD AUTO: 54.5 % — SIGNIFICANT CHANGE UP (ref 43–77)
NRBC # BLD: 0 /100 WBCS — SIGNIFICANT CHANGE UP (ref 0–0)
PLATELET # BLD AUTO: 42 K/UL — LOW (ref 150–400)
RBC # BLD: 2.25 M/UL — LOW (ref 4.2–5.8)
RBC # FLD: 20.6 % — HIGH (ref 10.3–14.5)
WBC # BLD: 3.27 K/UL — LOW (ref 3.8–10.5)
WBC # FLD AUTO: 3.27 K/UL — LOW (ref 3.8–10.5)

## 2022-08-18 PROCEDURE — 99214 OFFICE O/P EST MOD 30 MIN: CPT

## 2022-08-19 NOTE — PHYSICAL EXAM
[Fully active, able to carry on all pre-disease performance without restriction] : Status 0 - Fully active, able to carry on all pre-disease performance without restriction [Obese] : obese [Normal] : affect appropriate [de-identified] : JEANETH [de-identified] : supple [de-identified] : (+) S1S2 regular, tachy [de-identified] : no edema  [de-identified] : no tenderness [de-identified] : ROM intact [de-identified] : warm/dry [de-identified] : A&Ox3

## 2022-08-19 NOTE — ASSESSMENT
[FreeTextEntry1] : 35 y/o M presented April 2020 with aplastic anemia (very severe AA) confirmed on bone marrow biopsy at [facility] and now s/p inpatient treatment beginning 4/23/2020 with horse ATG + CsA + promacta with complete remission. Bone marrow bx on 11/9/20 revealed hypocellular marrow, overall cellularity improved (20-50%) showing marrow regeneration. BMBx on 5/18/2021 showing normal morphology with normal cellularity. Patient with worsening cytopenias in November 2021 and with neutropenic fever as well, sent inpatient, s/p treatment with rabbit ATG, prednisone, cyclosporine and Promacta, here for follow up. \par \par -Started Rabbit ATG on 12/27\par -Patient remains pancytopenic requiring intermittent transfusions.\par -Still requiring transfusions, and persistently jaundiced and with elevated bilirubin. In early 2021 thought likely Promacta related. Patient discharged on 300 mg daily Promacta, had been taking 150 mg inpatient. Was decreased back to 150 mg daily. Still with mildly elevated tbili which has not resolved. Abd US showed cholelithiasis without evidence of acute cholecystitis. Decreased Promacta to 75 mg daily, and ultimately determined more likely due to hemolysis. 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 \par -Repeat abdominal US with doppler - ruled out portal vein thrombosis. \par -Patient with recent ANSON likely related to recent supratherapeutic cyclosporine levels. This had resolved and stabilized. Cr again rising although cyclosporine level in 230s - will decrease cyclosporine A dose to 200 mg BID (total 100 mg decrease) and check level and creatinine this Friday. 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 BMBx done 8/1/22 which showed erythroid predominant trilineage hematopoiesis with maturation and mildly decreased megakaryocytes.\par - Consent obtained in the office today for Ravulizumab.\par -Meningitis vaccine series started 8/9/22. Plan will be to start Ravulizumab next week. \par -Patient understands and agrees with plan. All information explained to the best of my ability.\par - RTC in 2 weeks

## 2022-08-19 NOTE — HISTORY OF PRESENT ILLNESS
[Disease:__________________________] : Disease: [unfilled] [Therapy: ___] : Therapy: [unfilled] [de-identified] : 37 y/o M with no PMH presented to OSH originally for c/o dizziness and feeling unwell and was then transferred to [facility] for further hematologic evaluation as patient was found to be severely pancytopenic. Patient first started noticing scattered bruising throughout his body (mostly thighs and arms) about 6 weeks prior to presentation, and also had epistaxis. Pt had also noticed intermittent fevers and chills, responsive to tylenol, and noticed very dark stools for the same time period. Also with spontaneous gum bleeding and endorsed severe fatigue and SOB with exertion. Also noted 15 lbs weight loss, partially intentional over past 2-3 months prior to presentation.  Also c/o mild headache located in posterior aspect of his head, denies sore throat, cough, +nausea but no vomiting, no abdominal pain, no diarrhea, no BRBPR, no urinary symptoms, no LE swelling, +visual changes.     \par \par Patient was transferred to [facility] from [facility] on 4/18. Patient was started on IVF, Levaquin, and allopurinol. Patient transfused blood and platelets. Bone Marrow biopsy performed on 4/20 consistent with Aplastic Anemia. 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 Because of this, patient's platelet transfusion threshold was made 50k. Horse ATG test dose was applied 4/22 with no reaction. Treatment started 4/23 consisting of equine ATG at 40mg/kg/day x 4 days, Cyclosporine 10mg/kg/day divided doses BID, Eltrombopag 150mg daily. Patient had episode of hives with ATG infusion on Day 1 thus steroid regimen was changed from oral prednisone to IV Solumedrol Q8 for the duration of ATG treatment and transitioned back to oral prednisone 1mg/kg daily for days 5-10 then tapered. The patient had grade 3 transaminitis likely from Promacta and ATG and Promacta was held as of 5/1. While patient was in the hospital, his course was also complicated by epigastric pain consistent with dyspepsia, most likely due to cyclosporine. Was managed on pepcid BID and sucralfate q6 hours and mylanta as needed with good relief. \par \par On 5/12 the patient's transaminitis had improved(grade 2 total bili, grade 1 AST, grade 2 ALT) and Promacta 150 mg po QD was resumed. Pt's cyclosporine dose was adjusted  according to the level (goal 200-400) and he was on 400 mg po q12h as of 5/18. On 5/16, patient developed chest discomfort during platelet transfusion which improved with benadryl. EKG and CXR were wnl. Patient originally refused benadryl pre-medication prior to platelets that day due to side effects with taking Benadryl, and it was decided to premedicate pt with Claritin or Zyrtec pre blood transfusion instead. On 5/20, cyclosporine level was 460, therefore dose was lowered to 300 mg PO BID. Patient's vision had improved, so platelet transfusion threshold liberalized to 20k. \par \par Patient has since had a complete recovery of blood counts. He discontinued Promacta after 6 months of therapy. He has been monitored since then wit stable CBC and therapeutic cyclosporine levels. \par \par He has since had repeat bone marrow biopsy on 5/18/2021 with normal morphology and cellularity\par \par In September 2021 noted his platelets dropping, and ultimately slowly was becoming pancytopenia. He was restarted on cyclosporine at home, but ultimately was admitted in late December with neutropenic fever. Bone marrow biopsy was repeated on 12/2021 and showed decreased megakaryocytes and other findings consistent with relapse aplastic anemia. Patient was transferred to [address] [place] [address] on 12/23 to start treatment for relapse with rabbit ATG, cyclosporine, prednisone, and eltrombopag. On 12/27 rabbit ATG test dose was given with no adverse reaction. Cyclosporine was started evening of 12/26. Rabbit ATG started 12/29, patient with reaction upon increase in flow rate with fevers, rigors. Patient re-challenged 12/30, again had reaction after completion of Day 1 dose. ATG was subsequently held, but then restarted on 1/3 and completed remaining 5 day course on 1/6. Cyclosporine levels were checked biweekly, dose adjusted as needed based on level. Ultimately was discharged on 450 mg BID cyclosporine. Patient was seen by ENT for sinusitis and treated with Unasyn thru 1/19. Patient remained neutropenic and started ppx w/ levaquin on 1/20. [de-identified] : - Markedly hypocellular bone marrow (mostly 5% and focal 10-\par 15%) with focal minimal erythropoiesis, minimal to absent\par myelopoiesis, absent megakaryocytes, increased iron stores. Aplastic bone marrow.  [de-identified] : Cytogenetics: \par \par Result: Male karyotype\par Karyotype: 46,XY                           {12                           } [de-identified] : very severe Aplastic Anemia [FreeTextEntry1] : Therapy initiated 4/23/2020 [de-identified] : Patient presented for follow up on 8/2/22. Patient reports nausea.  Reports no febrile illnesses. No headaches, light-headedness No weight loss or night sweats. No chest pain, palpitations or shortness of breath. Denies any V/D. No pain symptoms at the present time. Normal bowel movements and normal urinary habits. Patient reports a good appetite at this time. Bilirubin is persistently elevated as are all other hemolytic markers. Patient with rising PNH clone, with BMBx done that day. Patient again presented for follow up on 8/09/22. Patient with no complaints today. He is scheduled for meningococcal vaccine today. No pain symptoms at the present time. Bilirubin is persistently elevated as are all other hemolytic markers but less so. Stable. \par \par Patient presented for follow up on 8/18/22. He felt well overall. Patient reported mild fatigue. He received the meningococcal vaccine with side effects of mild fatigue and fever.

## 2022-08-23 ENCOUNTER — RESULT REVIEW (OUTPATIENT)
Age: 37
End: 2022-08-23

## 2022-08-23 ENCOUNTER — APPOINTMENT (OUTPATIENT)
Dept: HEMATOLOGY ONCOLOGY | Facility: CLINIC | Age: 37
End: 2022-08-23

## 2022-08-23 ENCOUNTER — OUTPATIENT (OUTPATIENT)
Dept: OUTPATIENT SERVICES | Facility: HOSPITAL | Age: 37
LOS: 1 days | End: 2022-08-23
Payer: COMMERCIAL

## 2022-08-23 DIAGNOSIS — Z98.890 OTHER SPECIFIED POSTPROCEDURAL STATES: Chronic | ICD-10-CM

## 2022-08-23 DIAGNOSIS — D61.9 APLASTIC ANEMIA, UNSPECIFIED: ICD-10-CM

## 2022-08-23 LAB
ANISOCYTOSIS BLD QL: SLIGHT — SIGNIFICANT CHANGE UP
BASOPHILS # BLD AUTO: 0 K/UL — SIGNIFICANT CHANGE UP (ref 0–0.2)
BASOPHILS NFR BLD AUTO: 0 % — SIGNIFICANT CHANGE UP (ref 0–2)
DACRYOCYTES BLD QL SMEAR: SLIGHT — SIGNIFICANT CHANGE UP
ELLIPTOCYTES BLD QL SMEAR: SLIGHT — SIGNIFICANT CHANGE UP
EOSINOPHIL # BLD AUTO: 0.04 K/UL — SIGNIFICANT CHANGE UP (ref 0–0.5)
EOSINOPHIL NFR BLD AUTO: 2 % — SIGNIFICANT CHANGE UP (ref 0–6)
HCT VFR BLD CALC: 21.6 % — LOW (ref 39–50)
HGB BLD-MCNC: 7.4 G/DL — LOW (ref 13–17)
LYMPHOCYTES # BLD AUTO: 0.79 K/UL — LOW (ref 1–3.3)
LYMPHOCYTES # BLD AUTO: 41 % — SIGNIFICANT CHANGE UP (ref 13–44)
MACROCYTES BLD QL: SLIGHT — SIGNIFICANT CHANGE UP
MCHC RBC-ENTMCNC: 34.3 G/DL — SIGNIFICANT CHANGE UP (ref 32–36)
MCHC RBC-ENTMCNC: 35.1 PG — HIGH (ref 27–34)
MCV RBC AUTO: 102.4 FL — HIGH (ref 80–100)
MONOCYTES # BLD AUTO: 0.14 K/UL — SIGNIFICANT CHANGE UP (ref 0–0.9)
MONOCYTES NFR BLD AUTO: 7 % — SIGNIFICANT CHANGE UP (ref 2–14)
NEUTROPHILS # BLD AUTO: 0.97 K/UL — LOW (ref 1.8–7.4)
NEUTROPHILS NFR BLD AUTO: 50 % — SIGNIFICANT CHANGE UP (ref 43–77)
NRBC # BLD: 0 /100 — SIGNIFICANT CHANGE UP (ref 0–0)
NRBC # BLD: SIGNIFICANT CHANGE UP /100 WBCS (ref 0–0)
PLAT MORPH BLD: NORMAL — SIGNIFICANT CHANGE UP
PLATELET # BLD AUTO: 38 K/UL — LOW (ref 150–400)
POIKILOCYTOSIS BLD QL AUTO: SLIGHT — SIGNIFICANT CHANGE UP
RBC # BLD: 2.11 M/UL — LOW (ref 4.2–5.8)
RBC # FLD: 21.2 % — HIGH (ref 10.3–14.5)
RBC BLD AUTO: ABNORMAL
WBC # BLD: 1.93 K/UL — LOW (ref 3.8–10.5)
WBC # FLD AUTO: 1.93 K/UL — LOW (ref 3.8–10.5)

## 2022-08-23 PROCEDURE — 86850 RBC ANTIBODY SCREEN: CPT

## 2022-08-23 PROCEDURE — 86900 BLOOD TYPING SEROLOGIC ABO: CPT

## 2022-08-23 PROCEDURE — 86901 BLOOD TYPING SEROLOGIC RH(D): CPT

## 2022-08-23 PROCEDURE — 86923 COMPATIBILITY TEST ELECTRIC: CPT

## 2022-08-25 ENCOUNTER — APPOINTMENT (OUTPATIENT)
Dept: INFUSION THERAPY | Facility: HOSPITAL | Age: 37
End: 2022-08-25

## 2022-08-26 ENCOUNTER — NON-APPOINTMENT (OUTPATIENT)
Age: 37
End: 2022-08-26

## 2022-08-26 DIAGNOSIS — D59.5: ICD-10-CM

## 2022-08-30 ENCOUNTER — RESULT REVIEW (OUTPATIENT)
Age: 37
End: 2022-08-30

## 2022-08-30 ENCOUNTER — APPOINTMENT (OUTPATIENT)
Dept: HEMATOLOGY ONCOLOGY | Facility: CLINIC | Age: 37
End: 2022-08-30

## 2022-08-30 LAB
BASOPHILS # BLD AUTO: 0.01 K/UL — SIGNIFICANT CHANGE UP (ref 0–0.2)
BASOPHILS NFR BLD AUTO: 0.3 % — SIGNIFICANT CHANGE UP (ref 0–2)
EOSINOPHIL # BLD AUTO: 0.06 K/UL — SIGNIFICANT CHANGE UP (ref 0–0.5)
EOSINOPHIL NFR BLD AUTO: 1.9 % — SIGNIFICANT CHANGE UP (ref 0–6)
HCT VFR BLD CALC: 24.3 % — LOW (ref 39–50)
HGB BLD-MCNC: 8.4 G/DL — LOW (ref 13–17)
IMM GRANULOCYTES NFR BLD AUTO: 0.6 % — SIGNIFICANT CHANGE UP (ref 0–1.5)
LYMPHOCYTES # BLD AUTO: 0.84 K/UL — LOW (ref 1–3.3)
LYMPHOCYTES # BLD AUTO: 25.9 % — SIGNIFICANT CHANGE UP (ref 13–44)
MCHC RBC-ENTMCNC: 34.3 PG — HIGH (ref 27–34)
MCHC RBC-ENTMCNC: 34.6 G/DL — SIGNIFICANT CHANGE UP (ref 32–36)
MCV RBC AUTO: 99.2 FL — SIGNIFICANT CHANGE UP (ref 80–100)
MONOCYTES # BLD AUTO: 0.27 K/UL — SIGNIFICANT CHANGE UP (ref 0–0.9)
MONOCYTES NFR BLD AUTO: 8.3 % — SIGNIFICANT CHANGE UP (ref 2–14)
NEUTROPHILS # BLD AUTO: 2.04 K/UL — SIGNIFICANT CHANGE UP (ref 1.8–7.4)
NEUTROPHILS NFR BLD AUTO: 63 % — SIGNIFICANT CHANGE UP (ref 43–77)
NRBC # BLD: 0 /100 WBCS — SIGNIFICANT CHANGE UP (ref 0–0)
PLATELET # BLD AUTO: 66 K/UL — LOW (ref 150–400)
RBC # BLD: 2.45 M/UL — LOW (ref 4.2–5.8)
RBC # FLD: 20.8 % — HIGH (ref 10.3–14.5)
WBC # BLD: 3.24 K/UL — LOW (ref 3.8–10.5)
WBC # FLD AUTO: 3.24 K/UL — LOW (ref 3.8–10.5)

## 2022-09-06 ENCOUNTER — APPOINTMENT (OUTPATIENT)
Dept: INFUSION THERAPY | Facility: HOSPITAL | Age: 37
End: 2022-09-06

## 2022-09-07 ENCOUNTER — RESULT REVIEW (OUTPATIENT)
Age: 37
End: 2022-09-07

## 2022-09-07 ENCOUNTER — APPOINTMENT (OUTPATIENT)
Dept: HEMATOLOGY ONCOLOGY | Facility: CLINIC | Age: 37
End: 2022-09-07

## 2022-09-07 VITALS
SYSTOLIC BLOOD PRESSURE: 145 MMHG | HEART RATE: 64 BPM | RESPIRATION RATE: 16 BRPM | DIASTOLIC BLOOD PRESSURE: 85 MMHG | WEIGHT: 229.06 LBS | TEMPERATURE: 97 F | OXYGEN SATURATION: 99 % | BODY MASS INDEX: 34.72 KG/M2

## 2022-09-07 LAB
ANISOCYTOSIS BLD QL: SLIGHT — SIGNIFICANT CHANGE UP
BASOPHILS # BLD AUTO: 0.03 K/UL — SIGNIFICANT CHANGE UP (ref 0–0.2)
BASOPHILS NFR BLD AUTO: 1 % — SIGNIFICANT CHANGE UP (ref 0–2)
DACRYOCYTES BLD QL SMEAR: SLIGHT — SIGNIFICANT CHANGE UP
ELLIPTOCYTES BLD QL SMEAR: SLIGHT — SIGNIFICANT CHANGE UP
EOSINOPHIL # BLD AUTO: 0.08 K/UL — SIGNIFICANT CHANGE UP (ref 0–0.5)
EOSINOPHIL NFR BLD AUTO: 3 % — SIGNIFICANT CHANGE UP (ref 0–6)
HCT VFR BLD CALC: 22.8 % — LOW (ref 39–50)
HGB BLD-MCNC: 7.9 G/DL — LOW (ref 13–17)
LYMPHOCYTES # BLD AUTO: 1.11 K/UL — SIGNIFICANT CHANGE UP (ref 1–3.3)
LYMPHOCYTES # BLD AUTO: 43 % — SIGNIFICANT CHANGE UP (ref 13–44)
MACROCYTES BLD QL: SLIGHT — SIGNIFICANT CHANGE UP
MCHC RBC-ENTMCNC: 34.6 G/DL — SIGNIFICANT CHANGE UP (ref 32–36)
MCHC RBC-ENTMCNC: 35.1 PG — HIGH (ref 27–34)
MCV RBC AUTO: 101.3 FL — HIGH (ref 80–100)
MONOCYTES # BLD AUTO: 0.28 K/UL — SIGNIFICANT CHANGE UP (ref 0–0.9)
MONOCYTES NFR BLD AUTO: 11 % — SIGNIFICANT CHANGE UP (ref 2–14)
NEUTROPHILS # BLD AUTO: 1.08 K/UL — LOW (ref 1.8–7.4)
NEUTROPHILS NFR BLD AUTO: 42 % — LOW (ref 43–77)
NRBC # BLD: 0 /100 — SIGNIFICANT CHANGE UP (ref 0–0)
NRBC # BLD: SIGNIFICANT CHANGE UP /100 WBCS (ref 0–0)
PLAT MORPH BLD: NORMAL — SIGNIFICANT CHANGE UP
PLATELET # BLD AUTO: 75 K/UL — LOW (ref 150–400)
POIKILOCYTOSIS BLD QL AUTO: SLIGHT — SIGNIFICANT CHANGE UP
POLYCHROMASIA BLD QL SMEAR: SLIGHT — SIGNIFICANT CHANGE UP
RBC # BLD: 2.25 M/UL — LOW (ref 4.2–5.8)
RBC # FLD: 21.5 % — HIGH (ref 10.3–14.5)
RBC BLD AUTO: ABNORMAL
RETICS #: 150.5 K/UL — HIGH (ref 25–125)
RETICS/RBC NFR: 6.7 % — HIGH (ref 0.5–2.5)
WBC # BLD: 2.58 K/UL — LOW (ref 3.8–10.5)
WBC # FLD AUTO: 2.58 K/UL — LOW (ref 3.8–10.5)

## 2022-09-07 PROCEDURE — 99214 OFFICE O/P EST MOD 30 MIN: CPT

## 2022-09-07 RX ORDER — HYDROCHLOROTHIAZIDE 25 MG/1
25 TABLET ORAL DAILY
Qty: 90 | Refills: 1 | Status: DISCONTINUED | COMMUNITY
End: 2022-09-07

## 2022-09-08 ENCOUNTER — APPOINTMENT (OUTPATIENT)
Dept: INFUSION THERAPY | Facility: HOSPITAL | Age: 37
End: 2022-09-08

## 2022-09-08 DIAGNOSIS — Z51.11 ENCOUNTER FOR ANTINEOPLASTIC CHEMOTHERAPY: ICD-10-CM

## 2022-09-08 PROCEDURE — 90471 IMMUNIZATION ADMIN: CPT

## 2022-09-08 PROCEDURE — 90733 MPSV4 VACCINE SUBQ: CPT

## 2022-09-09 LAB
ALBUMIN SERPL ELPH-MCNC: 4.6 G/DL
ALP BLD-CCNC: 93 U/L
ALT SERPL-CCNC: 24 U/L
ANION GAP SERPL CALC-SCNC: 11 MMOL/L
AST SERPL-CCNC: 20 U/L
BILIRUB DIRECT SERPL-MCNC: 0.6 MG/DL
BILIRUB SERPL-MCNC: 2.2 MG/DL
BUN SERPL-MCNC: 15 MG/DL
CALCIUM SERPL-MCNC: 9.2 MG/DL
CHLORIDE SERPL-SCNC: 106 MMOL/L
CO2 SERPL-SCNC: 23 MMOL/L
CREAT SERPL-MCNC: 1.06 MG/DL
CYCLOSPORINE SER-MCNC: 362 NG/ML
EGFR: 94 ML/MIN/1.73M2
GLUCOSE SERPL-MCNC: 123 MG/DL
LDH SERPL-CCNC: 394 U/L
POTASSIUM SERPL-SCNC: 4.4 MMOL/L
PROT SERPL-MCNC: 7 G/DL
SARS-COV-2 N GENE NPH QL NAA+PROBE: NOT DETECTED
SODIUM SERPL-SCNC: 139 MMOL/L

## 2022-09-13 ENCOUNTER — RESULT REVIEW (OUTPATIENT)
Age: 37
End: 2022-09-13

## 2022-09-13 ENCOUNTER — NON-APPOINTMENT (OUTPATIENT)
Age: 37
End: 2022-09-13

## 2022-09-13 ENCOUNTER — APPOINTMENT (OUTPATIENT)
Dept: HEMATOLOGY ONCOLOGY | Facility: CLINIC | Age: 37
End: 2022-09-13

## 2022-09-13 LAB
ANISOCYTOSIS BLD QL: SLIGHT — SIGNIFICANT CHANGE UP
BASOPHILS # BLD AUTO: 0 K/UL — SIGNIFICANT CHANGE UP (ref 0–0.2)
BASOPHILS NFR BLD AUTO: 0 % — SIGNIFICANT CHANGE UP (ref 0–2)
DACRYOCYTES BLD QL SMEAR: SLIGHT — SIGNIFICANT CHANGE UP
ELLIPTOCYTES BLD QL SMEAR: SLIGHT — SIGNIFICANT CHANGE UP
EOSINOPHIL # BLD AUTO: 0.12 K/UL — SIGNIFICANT CHANGE UP (ref 0–0.5)
EOSINOPHIL NFR BLD AUTO: 6 % — SIGNIFICANT CHANGE UP (ref 0–6)
HCT VFR BLD CALC: 20.8 % — CRITICAL LOW (ref 39–50)
HGB BLD-MCNC: 6.9 G/DL — CRITICAL LOW (ref 13–17)
LYMPHOCYTES # BLD AUTO: 0.72 K/UL — LOW (ref 1–3.3)
LYMPHOCYTES # BLD AUTO: 35 % — SIGNIFICANT CHANGE UP (ref 13–44)
MACROCYTES BLD QL: SLIGHT — SIGNIFICANT CHANGE UP
MCHC RBC-ENTMCNC: 33.2 G/DL — SIGNIFICANT CHANGE UP (ref 32–36)
MCHC RBC-ENTMCNC: 33.8 PG — SIGNIFICANT CHANGE UP (ref 27–34)
MCV RBC AUTO: 102 FL — HIGH (ref 80–100)
MONOCYTES # BLD AUTO: 0.19 K/UL — SIGNIFICANT CHANGE UP (ref 0–0.9)
MONOCYTES NFR BLD AUTO: 9 % — SIGNIFICANT CHANGE UP (ref 2–14)
NEUTROPHILS # BLD AUTO: 1.04 K/UL — LOW (ref 1.8–7.4)
NEUTROPHILS NFR BLD AUTO: 50 % — SIGNIFICANT CHANGE UP (ref 43–77)
NRBC # BLD: 1 /100 — HIGH (ref 0–0)
NRBC # BLD: SIGNIFICANT CHANGE UP /100 WBCS (ref 0–0)
PLAT MORPH BLD: NORMAL — SIGNIFICANT CHANGE UP
PLATELET # BLD AUTO: 76 K/UL — LOW (ref 150–400)
POIKILOCYTOSIS BLD QL AUTO: SLIGHT — SIGNIFICANT CHANGE UP
RBC # BLD: 2.04 M/UL — LOW (ref 4.2–5.8)
RBC # FLD: 21.3 % — HIGH (ref 10.3–14.5)
RBC BLD AUTO: ABNORMAL
WBC # BLD: 2.07 K/UL — LOW (ref 3.8–10.5)
WBC # FLD AUTO: 2.07 K/UL — LOW (ref 3.8–10.5)

## 2022-09-13 PROCEDURE — 86901 BLOOD TYPING SEROLOGIC RH(D): CPT

## 2022-09-13 PROCEDURE — 86900 BLOOD TYPING SEROLOGIC ABO: CPT

## 2022-09-13 PROCEDURE — 86923 COMPATIBILITY TEST ELECTRIC: CPT

## 2022-09-13 PROCEDURE — 86850 RBC ANTIBODY SCREEN: CPT

## 2022-09-14 ENCOUNTER — APPOINTMENT (OUTPATIENT)
Dept: INFUSION THERAPY | Facility: HOSPITAL | Age: 37
End: 2022-09-14

## 2022-09-16 LAB
ALBUMIN SERPL ELPH-MCNC: 4.7 G/DL
ALP BLD-CCNC: 92 U/L
ALT SERPL-CCNC: 22 U/L
ANION GAP SERPL CALC-SCNC: 12 MMOL/L
AST SERPL-CCNC: 18 U/L
BILIRUB SERPL-MCNC: 1.7 MG/DL
BUN SERPL-MCNC: 15 MG/DL
CALCIUM SERPL-MCNC: 9.1 MG/DL
CHLORIDE SERPL-SCNC: 108 MMOL/L
CO2 SERPL-SCNC: 22 MMOL/L
CREAT SERPL-MCNC: 0.86 MG/DL
CYCLOSPORINE SER-MCNC: 57 NG/ML
EGFR: 117 ML/MIN/1.73M2
GLUCOSE SERPL-MCNC: 114 MG/DL
POTASSIUM SERPL-SCNC: 4.2 MMOL/L
PROT SERPL-MCNC: 6.7 G/DL
SODIUM SERPL-SCNC: 142 MMOL/L

## 2022-09-19 ENCOUNTER — NON-APPOINTMENT (OUTPATIENT)
Age: 37
End: 2022-09-19

## 2022-09-22 ENCOUNTER — OUTPATIENT (OUTPATIENT)
Dept: OUTPATIENT SERVICES | Facility: HOSPITAL | Age: 37
LOS: 1 days | Discharge: HOME | End: 2022-09-22

## 2022-09-22 DIAGNOSIS — D64.9 ANEMIA, UNSPECIFIED: ICD-10-CM

## 2022-09-22 DIAGNOSIS — Z98.890 OTHER SPECIFIED POSTPROCEDURAL STATES: Chronic | ICD-10-CM

## 2022-09-22 NOTE — PATIENT PROFILE ADULT - DO YOU LACK THE NECESSARY SUPPORT TO HELP YOU COPE WITH LIFE CHALLENGES?
Outreach attempt was made to schedule a Medicare Wellness Visit. This was the first attempt. Contact was made, MW[facility] appointment scheduled.   no

## 2022-09-27 ENCOUNTER — APPOINTMENT (OUTPATIENT)
Dept: HEMATOLOGY ONCOLOGY | Facility: CLINIC | Age: 37
End: 2022-09-27

## 2022-09-27 ENCOUNTER — OUTPATIENT (OUTPATIENT)
Dept: OUTPATIENT SERVICES | Facility: HOSPITAL | Age: 37
LOS: 1 days | End: 2022-09-27
Payer: COMMERCIAL

## 2022-09-27 ENCOUNTER — LABORATORY RESULT (OUTPATIENT)
Age: 37
End: 2022-09-27

## 2022-09-27 ENCOUNTER — RESULT REVIEW (OUTPATIENT)
Age: 37
End: 2022-09-27

## 2022-09-27 DIAGNOSIS — D61.9 APLASTIC ANEMIA, UNSPECIFIED: ICD-10-CM

## 2022-09-27 DIAGNOSIS — Z98.890 OTHER SPECIFIED POSTPROCEDURAL STATES: Chronic | ICD-10-CM

## 2022-09-27 LAB
BASOPHILS # BLD AUTO: 0.01 K/UL — SIGNIFICANT CHANGE UP (ref 0–0.2)
BASOPHILS NFR BLD AUTO: 0.4 % — SIGNIFICANT CHANGE UP (ref 0–2)
EOSINOPHIL # BLD AUTO: 0.16 K/UL — SIGNIFICANT CHANGE UP (ref 0–0.5)
EOSINOPHIL NFR BLD AUTO: 6.6 % — HIGH (ref 0–6)
HCT VFR BLD CALC: 26.4 % — LOW (ref 39–50)
HGB BLD-MCNC: 9.1 G/DL — LOW (ref 13–17)
IMM GRANULOCYTES NFR BLD AUTO: 0.4 % — SIGNIFICANT CHANGE UP (ref 0–0.9)
LYMPHOCYTES # BLD AUTO: 0.87 K/UL — LOW (ref 1–3.3)
LYMPHOCYTES # BLD AUTO: 36.1 % — SIGNIFICANT CHANGE UP (ref 13–44)
MCHC RBC-ENTMCNC: 33.6 PG — SIGNIFICANT CHANGE UP (ref 27–34)
MCHC RBC-ENTMCNC: 34.6 G/DL — SIGNIFICANT CHANGE UP (ref 32–36)
MCV RBC AUTO: 97 FL — SIGNIFICANT CHANGE UP (ref 80–100)
MONOCYTES # BLD AUTO: 0.3 K/UL — SIGNIFICANT CHANGE UP (ref 0–0.9)
MONOCYTES NFR BLD AUTO: 12.4 % — SIGNIFICANT CHANGE UP (ref 2–14)
NEUTROPHILS # BLD AUTO: 1.06 K/UL — LOW (ref 1.8–7.4)
NEUTROPHILS NFR BLD AUTO: 44.1 % — SIGNIFICANT CHANGE UP (ref 43–77)
NRBC # BLD: 0 /100 WBCS — SIGNIFICANT CHANGE UP (ref 0–0)
PLATELET # BLD AUTO: 90 K/UL — LOW (ref 150–400)
RBC # BLD: 2.71 M/UL — LOW (ref 4.2–5.8)
RBC # FLD: 21.1 % — HIGH (ref 10.3–14.5)
WBC # BLD: 2.41 K/UL — LOW (ref 3.8–10.5)
WBC # FLD AUTO: 2.41 K/UL — LOW (ref 3.8–10.5)

## 2022-10-03 ENCOUNTER — RESULT REVIEW (OUTPATIENT)
Age: 37
End: 2022-10-03

## 2022-10-03 ENCOUNTER — APPOINTMENT (OUTPATIENT)
Dept: HEMATOLOGY ONCOLOGY | Facility: CLINIC | Age: 37
End: 2022-10-03

## 2022-10-03 ENCOUNTER — LABORATORY RESULT (OUTPATIENT)
Age: 37
End: 2022-10-03

## 2022-10-03 VITALS
BODY MASS INDEX: 34.45 KG/M2 | DIASTOLIC BLOOD PRESSURE: 80 MMHG | HEART RATE: 81 BPM | TEMPERATURE: 96.3 F | RESPIRATION RATE: 15 BRPM | OXYGEN SATURATION: 99 % | SYSTOLIC BLOOD PRESSURE: 117 MMHG | WEIGHT: 227.27 LBS

## 2022-10-03 LAB
ANISOCYTOSIS BLD QL: SLIGHT — SIGNIFICANT CHANGE UP
BASOPHILS # BLD AUTO: 0 K/UL — SIGNIFICANT CHANGE UP (ref 0–0.2)
BASOPHILS NFR BLD AUTO: 0 % — SIGNIFICANT CHANGE UP (ref 0–2)
DACRYOCYTES BLD QL SMEAR: SLIGHT — SIGNIFICANT CHANGE UP
ELLIPTOCYTES BLD QL SMEAR: SLIGHT — SIGNIFICANT CHANGE UP
EOSINOPHIL # BLD AUTO: 0.28 K/UL — SIGNIFICANT CHANGE UP (ref 0–0.5)
EOSINOPHIL NFR BLD AUTO: 9.8 % — HIGH (ref 0–6)
HCT VFR BLD CALC: 25.5 % — LOW (ref 39–50)
HGB BLD-MCNC: 8.5 G/DL — LOW (ref 13–17)
IMM GRANULOCYTES NFR BLD AUTO: 1.1 % — HIGH (ref 0–0.9)
LYMPHOCYTES # BLD AUTO: 1.13 K/UL — SIGNIFICANT CHANGE UP (ref 1–3.3)
LYMPHOCYTES # BLD AUTO: 39.6 % — SIGNIFICANT CHANGE UP (ref 13–44)
MACROCYTES BLD QL: SLIGHT — SIGNIFICANT CHANGE UP
MCHC RBC-ENTMCNC: 33.1 PG — SIGNIFICANT CHANGE UP (ref 27–34)
MCHC RBC-ENTMCNC: 33.3 G/DL — SIGNIFICANT CHANGE UP (ref 32–36)
MCV RBC AUTO: 99.2 FL — SIGNIFICANT CHANGE UP (ref 80–100)
MONOCYTES # BLD AUTO: 0.42 K/UL — SIGNIFICANT CHANGE UP (ref 0–0.9)
MONOCYTES NFR BLD AUTO: 14.7 % — HIGH (ref 2–14)
NEUTROPHILS # BLD AUTO: 0.99 K/UL — LOW (ref 1.8–7.4)
NEUTROPHILS NFR BLD AUTO: 34.8 % — LOW (ref 43–77)
NRBC # BLD: 0 /100 WBCS — SIGNIFICANT CHANGE UP (ref 0–0)
PLAT MORPH BLD: NORMAL — SIGNIFICANT CHANGE UP
PLATELET # BLD AUTO: 106 K/UL — LOW (ref 150–400)
POIKILOCYTOSIS BLD QL AUTO: SLIGHT — SIGNIFICANT CHANGE UP
POLYCHROMASIA BLD QL SMEAR: SLIGHT — SIGNIFICANT CHANGE UP
RBC # BLD: 2.57 M/UL — LOW (ref 4.2–5.8)
RBC # FLD: 21.1 % — HIGH (ref 10.3–14.5)
RBC BLD AUTO: ABNORMAL
RETICS #: 136.5 K/UL — HIGH (ref 25–125)
RETICS/RBC NFR: 5.3 % — HIGH (ref 0.5–2.5)
SPHEROCYTES BLD QL SMEAR: SLIGHT — SIGNIFICANT CHANGE UP
WBC # BLD: 2.85 K/UL — LOW (ref 3.8–10.5)
WBC # FLD AUTO: 2.85 K/UL — LOW (ref 3.8–10.5)

## 2022-10-03 PROCEDURE — 99214 OFFICE O/P EST MOD 30 MIN: CPT

## 2022-10-03 NOTE — HISTORY OF PRESENT ILLNESS
[Disease:__________________________] : Disease: [unfilled] [Therapy: ___] : Therapy: [unfilled] [de-identified] : 35 y/o M with no PMH presented to OSH originally for c/o dizziness and feeling unwell and was then transferred to [facility] for further hematologic evaluation as patient was found to be severely pancytopenic. Patient first started noticing scattered bruising throughout his body (mostly thighs and arms) about 6 weeks prior to presentation, and also had epistaxis. Pt had also noticed intermittent fevers and chills, responsive to tylenol, and noticed very dark stools for the same time period. Also with spontaneous gum bleeding and endorsed severe fatigue and SOB with exertion. Also noted 15 lbs weight loss, partially intentional over past 2-3 months prior to presentation.  Also c/o mild headache located in posterior aspect of his head, denies sore throat, cough, +nausea but no vomiting, no abdominal pain, no diarrhea, no BRBPR, no urinary symptoms, no LE swelling, +visual changes.     \par \par Patient was transferred to [facility] from [facility] on 4/18. Patient was started on IVF, Levaquin, and allopurinol. Patient transfused blood and platelets. Bone Marrow biopsy performed on 4/20 consistent with Aplastic Anemia. 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 Because of this, patient's platelet transfusion threshold was made 50k. Horse ATG test dose was applied 4/22 with no reaction. Treatment started 4/23 consisting of equine ATG at 40mg/kg/day x 4 days, Cyclosporine 10mg/kg/day divided doses BID, Eltrombopag 150mg daily. Patient had episode of hives with ATG infusion on Day 1 thus steroid regimen was changed from oral prednisone to IV Solumedrol Q8 for the duration of ATG treatment and transitioned back to oral prednisone 1mg/kg daily for days 5-10 then tapered. The patient had grade 3 transaminitis likely from Promacta and ATG and Promacta was held as of 5/1. While patient was in the hospital, his course was also complicated by epigastric pain consistent with dyspepsia, most likely due to cyclosporine. Was managed on pepcid BID and sucralfate q6 hours and mylanta as needed with good relief. \par \par On 5/12 the patient's transaminitis had improved(grade 2 total bili, grade 1 AST, grade 2 ALT) and Promacta 150 mg po QD was resumed. Pt's cyclosporine dose was adjusted  according to the level (goal 200-400) and he was on 400 mg po q12h as of 5/18. On 5/16, patient developed chest discomfort during platelet transfusion which improved with benadryl. EKG and CXR were wnl. Patient originally refused benadryl pre-medication prior to platelets that day due to side effects with taking Benadryl, and it was decided to premedicate pt with Claritin or Zyrtec pre blood transfusion instead. On 5/20, cyclosporine level was 460, therefore dose was lowered to 300 mg PO BID. Patient's vision had improved, so platelet transfusion threshold liberalized to 20k. \par \par Patient has since had a complete recovery of blood counts. He discontinued Promacta after 6 months of therapy. He has been monitored since then wit stable CBC and therapeutic cyclosporine levels. \par \par He has since had repeat bone marrow biopsy on 5/18/2021 with normal morphology and cellularity\par \par In September 2021 noted his platelets dropping, and ultimately slowly was becoming pancytopenia. He was restarted on cyclosporine at home, but ultimately was admitted in late December with neutropenic fever. Bone marrow biopsy was repeated on 12/2021 and showed decreased megakaryocytes and other findings consistent with relapse aplastic anemia. Patient was transferred to [address] [place] [address] on 12/23 to start treatment for relapse with rabbit ATG, cyclosporine, prednisone, and eltrombopag. On 12/27 rabbit ATG test dose was given with no adverse reaction. Cyclosporine was started evening of 12/26. Rabbit ATG started 12/29, patient with reaction upon increase in flow rate with fevers, rigors. Patient re-challenged 12/30, again had reaction after completion of Day 1 dose. ATG was subsequently held, but then restarted on 1/3 and completed remaining 5 day course on 1/6. Cyclosporine levels were checked biweekly, dose adjusted as needed based on level. Ultimately was discharged on 450 mg BID cyclosporine. Patient was seen by ENT for sinusitis and treated with Unasyn thru 1/19. 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 BMBx showed erythroid predominant trilineage hematopoiesis with maturation and mildly decreased megakaryocytes. Patient ultimately given meningococcal vaccine and subsequently began treatment with loading dose of Ravulizumab (Ultomiris) on 8/25/2022. \par  [de-identified] : - Markedly hypocellular bone marrow (mostly 5% and focal 10-\par 15%) with focal minimal erythropoiesis, minimal to absent\par myelopoiesis, absent megakaryocytes, increased iron stores. Aplastic bone marrow.  [de-identified] : Cytogenetics: \par \par Result: Male karyotype\par Karyotype: 46,XY                             {12                             } [de-identified] : very severe Aplastic Anemia [FreeTextEntry1] : Ravulizumab started 8/25/22 [de-identified] : When patient started treatment with Ravulizumab he experienced nausea and vomiting. Patient presented for follow up on 10/03/2022. Patient reported feeling much better. He tries to be active but still does get tired easily. No fevers, chills, night sweats. No new pain symptoms at the present time. Nausea has resolved. No abdominal pain. Good appetite. Of note, patient's hemolytic markers and counts have markedly improved since starting treatment.

## 2022-10-03 NOTE — PHYSICAL EXAM
[Fully active, able to carry on all pre-disease performance without restriction] : Status 0 - Fully active, able to carry on all pre-disease performance without restriction [Obese] : obese [Normal] : affect appropriate [de-identified] : JEANETH [de-identified] : supple [de-identified] : (+) S1S2 RRR [de-identified] : no edema  [de-identified] : no tenderness [de-identified] : ROM intact [de-identified] : warm/dry [de-identified] : A&Ox3

## 2022-10-03 NOTE — REVIEW OF SYSTEMS
[Fatigue] : fatigue [Recent Change In Weight] : ~T recent weight change [Negative] : Heme/Lymph [Fever] : no fever [Chills] : no chills [Night Sweats] : no night sweats [Vision Problems] : no vision problems [Dysphagia] : no dysphagia [Nosebleeds] : no nosebleeds [Odynophagia] : no odynophagia [Chest Pain] : no chest pain [Palpitations] : no palpitations [Lower Ext Edema] : no lower extremity edema [Shortness Of Breath] : no shortness of breath [Abdominal Pain] : no abdominal pain [Vomiting] : no vomiting [Constipation] : no constipation [Diarrhea] : no diarrhea [Muscle Weakness] : no muscle weakness [Insomnia] : no insomnia [Anxiety] : no anxiety [Hot Flashes] : no hot flashes [FreeTextEntry2] : mild fatigue [FreeTextEntry7] : (+) nausea

## 2022-10-04 ENCOUNTER — APPOINTMENT (OUTPATIENT)
Dept: INFUSION THERAPY | Facility: HOSPITAL | Age: 37
End: 2022-10-04

## 2022-10-04 PROCEDURE — 90734 MENACWYD/MENACWYCRM VACC IM: CPT

## 2022-10-04 PROCEDURE — 90471 IMMUNIZATION ADMIN: CPT

## 2022-10-07 LAB
ALBUMIN SERPL ELPH-MCNC: 4.7 G/DL
ALBUMIN SERPL ELPH-MCNC: 4.9 G/DL
ALP BLD-CCNC: 103 U/L
ALP BLD-CCNC: 95 U/L
ALT SERPL-CCNC: 21 U/L
ALT SERPL-CCNC: 24 U/L
ANION GAP SERPL CALC-SCNC: 10 MMOL/L
ANION GAP SERPL CALC-SCNC: 11 MMOL/L
AST SERPL-CCNC: 16 U/L
AST SERPL-CCNC: 19 U/L
BILIRUB INDIRECT SERPL-MCNC: 1.6 MG/DL
BILIRUB INDIRECT SERPL-MCNC: 1.6 MG/DL
BILIRUB SERPL-MCNC: 2 MG/DL
BILIRUB SERPL-MCNC: 2 MG/DL
BUN SERPL-MCNC: 16 MG/DL
BUN SERPL-MCNC: 17 MG/DL
CALCIUM SERPL-MCNC: 9.1 MG/DL
CALCIUM SERPL-MCNC: 9.5 MG/DL
CHLORIDE SERPL-SCNC: 105 MMOL/L
CHLORIDE SERPL-SCNC: 106 MMOL/L
CO2 SERPL-SCNC: 24 MMOL/L
CO2 SERPL-SCNC: 24 MMOL/L
CREAT SERPL-MCNC: 0.9 MG/DL
CREAT SERPL-MCNC: 1 MG/DL
CYCLOSPORINE SER-MCNC: 85 NG/ML
EGFR: 101 ML/MIN/1.73M2
EGFR: 115 ML/MIN/1.73M2
GLUCOSE SERPL-MCNC: 134 MG/DL
GLUCOSE SERPL-MCNC: 96 MG/DL
HAPTOGLOB SERPL-MCNC: 74 MG/DL
HAPTOGLOB SERPL-MCNC: 85 MG/DL
LDH SERPL-CCNC: 267 U/L
LDH SERPL-CCNC: 284 U/L
POTASSIUM SERPL-SCNC: 4.2 MMOL/L
POTASSIUM SERPL-SCNC: 4.3 MMOL/L
PROT SERPL-MCNC: 7.3 G/DL
PROT SERPL-MCNC: 7.4 G/DL
SODIUM SERPL-SCNC: 139 MMOL/L
SODIUM SERPL-SCNC: 142 MMOL/L

## 2022-10-07 NOTE — ASSESSMENT
[FreeTextEntry1] : 35 y/o M presented April 2020 with aplastic anemia (very severe AA) confirmed on bone marrow biopsy at [facility] and now s/p inpatient treatment beginning 4/23/2020 with horse ATG + CsA + promacta with complete remission. Bone marrow bx on 11/9/20 revealed hypocellular marrow, overall cellularity improved (20-50%) showing marrow regeneration. BMBx on 5/18/2021 showing normal morphology with normal cellularity. 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 In early 2021 thought likely Promacta related. Patient discharged on 300 mg daily Promacta, had been taking 150 mg inpatient. Was decreased back to 150 mg daily. Still with mildly elevated t. bili which has not resolved. Abd US showed cholelithiasis without evidence of acute cholecystitis. Decreased Promacta to 75 mg daily, and ultimately determined more likely due to hemolysis. \par -Repeat abdominal US with doppler - ruled out portal vein thrombosis. \par -Patient with recent ANSON likely related to recent supratherapeutic cyclosporine levels. This had resolved and stabilized. 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 BMBx done 8/1/22 which showed erythroid predominant trilineage hematopoiesis with maturation and mildly decreased megakaryocytes.\par -Continue Ravulizumab. Next dose is scheduled for tomorrow, 9/8 then next dose will be in 2 months.\par -Meningitis vaccine series started 8/9/22. \par -Continue lab work every 2 weeks. Will defer transfusion for Hgb of 7.9 as patient is asymptomatic and add T&C to labs on 9/13.\par -Discussed patient becoming a , and how at this point I feel based on his counts he is still not safe to perform a job like that \par -Patient understands and agrees with plan. All information explained to the best of my ability.\par -RTC in 1 month

## 2022-10-07 NOTE — PHYSICAL EXAM
[Fully active, able to carry on all pre-disease performance without restriction] : Status 0 - Fully active, able to carry on all pre-disease performance without restriction [Obese] : obese [Normal] : affect appropriate [de-identified] : JEANETH [de-identified] : supple [de-identified] : (+) S1S2 RRR [de-identified] : no edema  [de-identified] : no tenderness [de-identified] : ROM intact [de-identified] : warm/dry [de-identified] : A&Ox3

## 2022-10-07 NOTE — HISTORY OF PRESENT ILLNESS
[Disease:__________________________] : Disease: [unfilled] [Therapy: ___] : Therapy: [unfilled] [de-identified] : 35 y/o M with no PMH presented to OSH originally for c/o dizziness and feeling unwell and was then transferred to [facility] for further hematologic evaluation as patient was found to be severely pancytopenic. Patient first started noticing scattered bruising throughout his body (mostly thighs and arms) about 6 weeks prior to presentation, and also had epistaxis. Pt had also noticed intermittent fevers and chills, responsive to tylenol, and noticed very dark stools for the same time period. Also with spontaneous gum bleeding and endorsed severe fatigue and SOB with exertion. Also noted 15 lbs weight loss, partially intentional over past 2-3 months prior to presentation.  Also c/o mild headache located in posterior aspect of his head, denies sore throat, cough, +nausea but no vomiting, no abdominal pain, no diarrhea, no BRBPR, no urinary symptoms, no LE swelling, +visual changes.     \par \par Patient was transferred to [facility] from [facility] on 4/18. Patient was started on IVF, Levaquin, and allopurinol. Patient transfused blood and platelets. Bone Marrow biopsy performed on 4/20 consistent with Aplastic Anemia. 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 Because of this, patient's platelet transfusion threshold was made 50k. Horse ATG test dose was applied 4/22 with no reaction. Treatment started 4/23 consisting of equine ATG at 40mg/kg/day x 4 days, Cyclosporine 10mg/kg/day divided doses BID, Eltrombopag 150mg daily. Patient had episode of hives with ATG infusion on Day 1 thus steroid regimen was changed from oral prednisone to IV Solumedrol Q8 for the duration of ATG treatment and transitioned back to oral prednisone 1mg/kg daily for days 5-10 then tapered. The patient had grade 3 transaminitis likely from Promacta and ATG and Promacta was held as of 5/1. While patient was in the hospital, his course was also complicated by epigastric pain consistent with dyspepsia, most likely due to cyclosporine. Was managed on pepcid BID and sucralfate q6 hours and mylanta as needed with good relief. \par \par On 5/12 the patient's transaminitis had improved(grade 2 total bili, grade 1 AST, grade 2 ALT) and Promacta 150 mg po QD was resumed. Pt's cyclosporine dose was adjusted  according to the level (goal 200-400) and he was on 400 mg po q12h as of 5/18. On 5/16, patient developed chest discomfort during platelet transfusion which improved with benadryl. EKG and CXR were wnl. Patient originally refused benadryl pre-medication prior to platelets that day due to side effects with taking Benadryl, and it was decided to premedicate pt with Claritin or Zyrtec pre blood transfusion instead. On 5/20, cyclosporine level was 460, therefore dose was lowered to 300 mg PO BID. Patient's vision had improved, so platelet transfusion threshold liberalized to 20k. \par \par Patient has since had a complete recovery of blood counts. He discontinued Promacta after 6 months of therapy. He has been monitored since then wit stable CBC and therapeutic cyclosporine levels. \par \par He has since had repeat bone marrow biopsy on 5/18/2021 with normal morphology and cellularity\par \par In September 2021 noted his platelets dropping, and ultimately slowly was becoming pancytopenia. He was restarted on cyclosporine at home, but ultimately was admitted in late December with neutropenic fever. Bone marrow biopsy was repeated on 12/2021 and showed decreased megakaryocytes and other findings consistent with relapse aplastic anemia. Patient was transferred to [address] [place] [address] on 12/23 to start treatment for relapse with rabbit ATG, cyclosporine, prednisone, and eltrombopag. On 12/27 rabbit ATG test dose was given with no adverse reaction. Cyclosporine was started evening of 12/26. Rabbit ATG started 12/29, patient with reaction upon increase in flow rate with fevers, rigors. Patient re-challenged 12/30, again had reaction after completion of Day 1 dose. ATG was subsequently held, but then restarted on 1/3 and completed remaining 5 day course on 1/6. Cyclosporine levels were checked biweekly, dose adjusted as needed based on level. Ultimately was discharged on 450 mg BID cyclosporine. Patient was seen by ENT for sinusitis and treated with Unasyn thru 1/19. Patient remained neutropenic and started ppx w/ levaquin on 1/20. [de-identified] : - Markedly hypocellular bone marrow (mostly 5% and focal 10-\par 15%) with focal minimal erythropoiesis, minimal to absent\par myelopoiesis, absent megakaryocytes, increased iron stores. Aplastic bone marrow.  [de-identified] : Cytogenetics: \par \par Result: Male karyotype\par Karyotype: 46,XY                            {12                            } [de-identified] : very severe Aplastic Anemia [FreeTextEntry1] : Ravulizumab started 8/25/22 [de-identified] : Patient presented for follow up today. Patient report he has been having a decreased appetite since starting Ravulizumab and has had a 4lb weight loss since last visit. He states that he has been forcing himself to eat in the morning and if he does not do that he typically wont get hungry until dinner time. He has started to eat cereal and fruits as snacks and at times will get nauseous and have to take Zofran which does not always work. Reports no fevers since the one he experienced after getting meningococcal vaccine last month. Of note the patient states he got out of bed in the morning about two weeks ago and felt lightheaded and lowered himself to the floor because he felt he was going to fall. His BP at the time was normal and this has not happened since. He had no LOC or trauma associated with the incidence. States he feels mildly tired but not like he did prior to starting treatment.  Denies any headaches, night sweats, chest pain, palpitations or shortness of breath. Denies any V/D. No pain symptoms at the present time. Normal bowel movements and normal urinary habits.

## 2022-10-07 NOTE — REVIEW OF SYSTEMS
[Fatigue] : fatigue [Negative] : Heme/Lymph [Recent Change In Weight] : ~T recent weight change [Fever] : no fever [Chills] : no chills [Night Sweats] : no night sweats [Vision Problems] : no vision problems [Dysphagia] : no dysphagia [Nosebleeds] : no nosebleeds [Odynophagia] : no odynophagia [Chest Pain] : no chest pain [Palpitations] : no palpitations [Lower Ext Edema] : no lower extremity edema [Shortness Of Breath] : no shortness of breath [Abdominal Pain] : no abdominal pain [Vomiting] : no vomiting [Constipation] : no constipation [Diarrhea] : no diarrhea [Muscle Weakness] : no muscle weakness [Insomnia] : no insomnia [Anxiety] : no anxiety [Hot Flashes] : no hot flashes [FreeTextEntry2] : mild fatigue [FreeTextEntry7] : (+) nausea

## 2022-10-17 ENCOUNTER — RESULT REVIEW (OUTPATIENT)
Age: 37
End: 2022-10-17

## 2022-10-17 ENCOUNTER — APPOINTMENT (OUTPATIENT)
Dept: HEMATOLOGY ONCOLOGY | Facility: CLINIC | Age: 37
End: 2022-10-17

## 2022-10-17 LAB
BASOPHILS # BLD AUTO: 0.02 K/UL — SIGNIFICANT CHANGE UP (ref 0–0.2)
BASOPHILS NFR BLD AUTO: 0.7 % — SIGNIFICANT CHANGE UP (ref 0–2)
EOSINOPHIL # BLD AUTO: 0.13 K/UL — SIGNIFICANT CHANGE UP (ref 0–0.5)
EOSINOPHIL NFR BLD AUTO: 4.8 % — SIGNIFICANT CHANGE UP (ref 0–6)
HCT VFR BLD CALC: 21.8 % — LOW (ref 39–50)
HGB BLD-MCNC: 7.6 G/DL — LOW (ref 13–17)
IMM GRANULOCYTES NFR BLD AUTO: 1.5 % — HIGH (ref 0–0.9)
LYMPHOCYTES # BLD AUTO: 1.04 K/UL — SIGNIFICANT CHANGE UP (ref 1–3.3)
LYMPHOCYTES # BLD AUTO: 38.4 % — SIGNIFICANT CHANGE UP (ref 13–44)
MCHC RBC-ENTMCNC: 34.7 PG — HIGH (ref 27–34)
MCHC RBC-ENTMCNC: 34.9 G/DL — SIGNIFICANT CHANGE UP (ref 32–36)
MCV RBC AUTO: 99.5 FL — SIGNIFICANT CHANGE UP (ref 80–100)
MONOCYTES # BLD AUTO: 0.37 K/UL — SIGNIFICANT CHANGE UP (ref 0–0.9)
MONOCYTES NFR BLD AUTO: 13.7 % — SIGNIFICANT CHANGE UP (ref 2–14)
NEUTROPHILS # BLD AUTO: 1.11 K/UL — LOW (ref 1.8–7.4)
NEUTROPHILS NFR BLD AUTO: 40.9 % — LOW (ref 43–77)
NRBC # BLD: 0 /100 WBCS — SIGNIFICANT CHANGE UP (ref 0–0)
PLATELET # BLD AUTO: 102 K/UL — LOW (ref 150–400)
RBC # BLD: 2.19 M/UL — LOW (ref 4.2–5.8)
RBC # FLD: 23.3 % — HIGH (ref 10.3–14.5)
WBC # BLD: 2.71 K/UL — LOW (ref 3.8–10.5)
WBC # FLD AUTO: 2.71 K/UL — LOW (ref 3.8–10.5)

## 2022-10-19 ENCOUNTER — NON-APPOINTMENT (OUTPATIENT)
Age: 37
End: 2022-10-19

## 2022-10-20 ENCOUNTER — APPOINTMENT (OUTPATIENT)
Dept: INFUSION THERAPY | Facility: HOSPITAL | Age: 37
End: 2022-10-20

## 2022-10-21 DIAGNOSIS — Z51.89 ENCOUNTER FOR OTHER SPECIFIED AFTERCARE: ICD-10-CM

## 2022-10-21 DIAGNOSIS — R11.2 NAUSEA WITH VOMITING, UNSPECIFIED: ICD-10-CM

## 2022-10-21 DIAGNOSIS — D61.9 APLASTIC ANEMIA, UNSPECIFIED: ICD-10-CM

## 2022-10-21 NOTE — ED PROVIDER NOTE - PHYSICAL EXAMINATION
N/A
N/A
Gen: NAD, AAOx3, non-toxic appearing.  HEENT: NCAT, normal conjunctiva, oral mucosa moist, dried blood in nares.   Lung: speaking in full sentences, good aeration bilaterally, lungs CTA b/l.  CV: regular rate and rhythm. cap refill <2x. peripheral pulses 2+bilaterally.   Abd: soft, ND, NT.  MSK: no visible deformities.  Neuro: No focal deficits.  Skin: Intact.  Psych: normal affect.

## 2022-10-26 LAB
ALBUMIN SERPL ELPH-MCNC: 4.8 G/DL
ALP BLD-CCNC: 95 U/L
ALT SERPL-CCNC: 29 U/L
ANION GAP SERPL CALC-SCNC: 11 MMOL/L
AST SERPL-CCNC: 21 U/L
BILIRUB DIRECT SERPL-MCNC: 0.4 MG/DL
BILIRUB SERPL-MCNC: 2.2 MG/DL
BUN SERPL-MCNC: 17 MG/DL
CALCIUM SERPL-MCNC: 9.1 MG/DL
CHLORIDE SERPL-SCNC: 106 MMOL/L
CO2 SERPL-SCNC: 24 MMOL/L
CREAT SERPL-MCNC: 0.92 MG/DL
CYCLOSPORINE SER-MCNC: 103 NG/ML
EGFR: 112 ML/MIN/1.73M2
GLUCOSE SERPL-MCNC: 97 MG/DL
LDH SERPL-CCNC: 309 U/L
POTASSIUM SERPL-SCNC: 4.4 MMOL/L
PROT SERPL-MCNC: 7.2 G/DL
SODIUM SERPL-SCNC: 141 MMOL/L

## 2022-11-03 ENCOUNTER — RESULT REVIEW (OUTPATIENT)
Age: 37
End: 2022-11-03

## 2022-11-03 ENCOUNTER — APPOINTMENT (OUTPATIENT)
Dept: INFUSION THERAPY | Facility: HOSPITAL | Age: 37
End: 2022-11-03

## 2022-11-03 LAB
ALBUMIN SERPL ELPH-MCNC: 4.5 G/DL — SIGNIFICANT CHANGE UP (ref 3.3–5)
ALP SERPL-CCNC: 88 U/L — SIGNIFICANT CHANGE UP (ref 40–120)
ALT FLD-CCNC: 26 U/L — SIGNIFICANT CHANGE UP (ref 10–45)
ANION GAP SERPL CALC-SCNC: 10 MMOL/L — SIGNIFICANT CHANGE UP (ref 5–17)
AST SERPL-CCNC: 19 U/L — SIGNIFICANT CHANGE UP (ref 10–40)
BASOPHILS # BLD AUTO: 0.01 K/UL — SIGNIFICANT CHANGE UP (ref 0–0.2)
BASOPHILS NFR BLD AUTO: 0.4 % — SIGNIFICANT CHANGE UP (ref 0–2)
BILIRUB DIRECT SERPL-MCNC: 0.5 MG/DL — HIGH (ref 0–0.3)
BILIRUB SERPL-MCNC: 2.8 MG/DL — HIGH (ref 0.2–1.2)
BUN SERPL-MCNC: 18 MG/DL — SIGNIFICANT CHANGE UP (ref 7–23)
CALCIUM SERPL-MCNC: 9.2 MG/DL — SIGNIFICANT CHANGE UP (ref 8.4–10.5)
CHLORIDE SERPL-SCNC: 105 MMOL/L — SIGNIFICANT CHANGE UP (ref 96–108)
CO2 SERPL-SCNC: 26 MMOL/L — SIGNIFICANT CHANGE UP (ref 22–31)
CREAT SERPL-MCNC: 0.99 MG/DL — SIGNIFICANT CHANGE UP (ref 0.5–1.3)
EGFR: 103 ML/MIN/1.73M2 — SIGNIFICANT CHANGE UP
EOSINOPHIL # BLD AUTO: 0.08 K/UL — SIGNIFICANT CHANGE UP (ref 0–0.5)
EOSINOPHIL NFR BLD AUTO: 3.4 % — SIGNIFICANT CHANGE UP (ref 0–6)
GLUCOSE SERPL-MCNC: 123 MG/DL — HIGH (ref 70–99)
HCT VFR BLD CALC: 25.3 % — LOW (ref 39–50)
HGB BLD-MCNC: 8.8 G/DL — LOW (ref 13–17)
IMM GRANULOCYTES NFR BLD AUTO: 0.4 % — SIGNIFICANT CHANGE UP (ref 0–0.9)
LDH SERPL L TO P-CCNC: 299 U/L — HIGH (ref 50–242)
LYMPHOCYTES # BLD AUTO: 0.85 K/UL — LOW (ref 1–3.3)
LYMPHOCYTES # BLD AUTO: 36.2 % — SIGNIFICANT CHANGE UP (ref 13–44)
MCHC RBC-ENTMCNC: 34.4 PG — HIGH (ref 27–34)
MCHC RBC-ENTMCNC: 34.8 G/DL — SIGNIFICANT CHANGE UP (ref 32–36)
MCV RBC AUTO: 98.8 FL — SIGNIFICANT CHANGE UP (ref 80–100)
MONOCYTES # BLD AUTO: 0.28 K/UL — SIGNIFICANT CHANGE UP (ref 0–0.9)
MONOCYTES NFR BLD AUTO: 11.9 % — SIGNIFICANT CHANGE UP (ref 2–14)
NEUTROPHILS # BLD AUTO: 1.12 K/UL — LOW (ref 1.8–7.4)
NEUTROPHILS NFR BLD AUTO: 47.7 % — SIGNIFICANT CHANGE UP (ref 43–77)
NRBC # BLD: 0 /100 WBCS — SIGNIFICANT CHANGE UP (ref 0–0)
PLATELET # BLD AUTO: 119 K/UL — LOW (ref 150–400)
POTASSIUM SERPL-MCNC: 4 MMOL/L — SIGNIFICANT CHANGE UP (ref 3.5–5.3)
POTASSIUM SERPL-SCNC: 4 MMOL/L — SIGNIFICANT CHANGE UP (ref 3.5–5.3)
PROT SERPL-MCNC: 7 G/DL — SIGNIFICANT CHANGE UP (ref 6–8.3)
RBC # BLD: 2.56 M/UL — LOW (ref 4.2–5.8)
RBC # FLD: 23.2 % — HIGH (ref 10.3–14.5)
SODIUM SERPL-SCNC: 140 MMOL/L — SIGNIFICANT CHANGE UP (ref 135–145)
WBC # BLD: 2.35 K/UL — LOW (ref 3.8–10.5)
WBC # FLD AUTO: 2.35 K/UL — LOW (ref 3.8–10.5)

## 2022-11-03 PROCEDURE — 86923 COMPATIBILITY TEST ELECTRIC: CPT

## 2022-11-03 PROCEDURE — 86900 BLOOD TYPING SEROLOGIC ABO: CPT

## 2022-11-03 PROCEDURE — 86850 RBC ANTIBODY SCREEN: CPT

## 2022-11-03 PROCEDURE — 86901 BLOOD TYPING SEROLOGIC RH(D): CPT

## 2022-11-09 ENCOUNTER — LABORATORY RESULT (OUTPATIENT)
Age: 37
End: 2022-11-09

## 2022-11-09 ENCOUNTER — RESULT REVIEW (OUTPATIENT)
Age: 37
End: 2022-11-09

## 2022-11-09 ENCOUNTER — OUTPATIENT (OUTPATIENT)
Dept: OUTPATIENT SERVICES | Facility: HOSPITAL | Age: 37
LOS: 1 days | End: 2022-11-09
Payer: COMMERCIAL

## 2022-11-09 ENCOUNTER — APPOINTMENT (OUTPATIENT)
Dept: HEMATOLOGY ONCOLOGY | Facility: CLINIC | Age: 37
End: 2022-11-09

## 2022-11-09 VITALS
BODY MASS INDEX: 34.92 KG/M2 | HEART RATE: 81 BPM | OXYGEN SATURATION: 99 % | TEMPERATURE: 97 F | DIASTOLIC BLOOD PRESSURE: 88 MMHG | RESPIRATION RATE: 16 BRPM | WEIGHT: 230.38 LBS | SYSTOLIC BLOOD PRESSURE: 141 MMHG

## 2022-11-09 DIAGNOSIS — Z98.890 OTHER SPECIFIED POSTPROCEDURAL STATES: Chronic | ICD-10-CM

## 2022-11-09 DIAGNOSIS — D61.9 APLASTIC ANEMIA, UNSPECIFIED: ICD-10-CM

## 2022-11-09 LAB
ANISOCYTOSIS BLD QL: SLIGHT — SIGNIFICANT CHANGE UP
BASOPHILS # BLD AUTO: 0 K/UL — SIGNIFICANT CHANGE UP (ref 0–0.2)
BASOPHILS NFR BLD AUTO: 0 % — SIGNIFICANT CHANGE UP (ref 0–2)
DACRYOCYTES BLD QL SMEAR: SLIGHT — SIGNIFICANT CHANGE UP
EOSINOPHIL # BLD AUTO: 0.27 K/UL — SIGNIFICANT CHANGE UP (ref 0–0.5)
EOSINOPHIL NFR BLD AUTO: 9 % — HIGH (ref 0–6)
HCT VFR BLD CALC: 26 % — LOW (ref 39–50)
HGB BLD-MCNC: 8.6 G/DL — LOW (ref 13–17)
LYMPHOCYTES # BLD AUTO: 1.48 K/UL — SIGNIFICANT CHANGE UP (ref 1–3.3)
LYMPHOCYTES # BLD AUTO: 50 % — HIGH (ref 13–44)
MACROCYTES BLD QL: SLIGHT — SIGNIFICANT CHANGE UP
MCHC RBC-ENTMCNC: 33.1 G/DL — SIGNIFICANT CHANGE UP (ref 32–36)
MCHC RBC-ENTMCNC: 34.1 PG — HIGH (ref 27–34)
MCV RBC AUTO: 103.2 FL — HIGH (ref 80–100)
MONOCYTES # BLD AUTO: 0.3 K/UL — SIGNIFICANT CHANGE UP (ref 0–0.9)
MONOCYTES NFR BLD AUTO: 10 % — SIGNIFICANT CHANGE UP (ref 2–14)
MYELOCYTES NFR BLD: 1 % — HIGH (ref 0–0)
NEUTROPHILS # BLD AUTO: 0.89 K/UL — LOW (ref 1.8–7.4)
NEUTROPHILS NFR BLD AUTO: 30 % — LOW (ref 43–77)
NRBC # BLD: 0 /100 — SIGNIFICANT CHANGE UP (ref 0–0)
NRBC # BLD: SIGNIFICANT CHANGE UP /100 WBCS (ref 0–0)
OVALOCYTES BLD QL SMEAR: SLIGHT — SIGNIFICANT CHANGE UP
PLAT MORPH BLD: NORMAL — SIGNIFICANT CHANGE UP
PLATELET # BLD AUTO: 126 K/UL — LOW (ref 150–400)
POIKILOCYTOSIS BLD QL AUTO: SLIGHT — SIGNIFICANT CHANGE UP
POLYCHROMASIA BLD QL SMEAR: SLIGHT — SIGNIFICANT CHANGE UP
RBC # BLD: 2.52 M/UL — LOW (ref 4.2–5.8)
RBC # FLD: 23.9 % — HIGH (ref 10.3–14.5)
RBC BLD AUTO: ABNORMAL
WBC # BLD: 2.95 K/UL — LOW (ref 3.8–10.5)
WBC # FLD AUTO: 2.95 K/UL — LOW (ref 3.8–10.5)

## 2022-11-09 PROCEDURE — 99213 OFFICE O/P EST LOW 20 MIN: CPT

## 2022-11-09 RX ORDER — SUCRALFATE 1 G/1
1 TABLET ORAL 4 TIMES DAILY
Qty: 120 | Refills: 3 | Status: DISCONTINUED | COMMUNITY
Start: 2020-06-10 | End: 2022-11-09

## 2022-11-18 LAB
ALBUMIN SERPL ELPH-MCNC: 4.8 G/DL
ALP BLD-CCNC: 94 U/L
ALT SERPL-CCNC: 32 U/L
ANION GAP SERPL CALC-SCNC: 10 MMOL/L
AST SERPL-CCNC: 22 U/L
BILIRUB INDIRECT SERPL-MCNC: 2.5 MG/DL
BILIRUB SERPL-MCNC: 3 MG/DL
BUN SERPL-MCNC: 12 MG/DL
CALCIUM SERPL-MCNC: 9.3 MG/DL
CHLORIDE SERPL-SCNC: 105 MMOL/L
CO2 SERPL-SCNC: 24 MMOL/L
CREAT SERPL-MCNC: 0.88 MG/DL
CYCLOSPORINE SER-MCNC: 171 NG/ML
EGFR: 116 ML/MIN/1.73M2
GLUCOSE SERPL-MCNC: 100 MG/DL
LDH SERPL-CCNC: 308 U/L
POTASSIUM SERPL-SCNC: 4.7 MMOL/L
PROT SERPL-MCNC: 7.5 G/DL
SODIUM SERPL-SCNC: 139 MMOL/L

## 2022-11-18 NOTE — REVIEW OF SYSTEMS
[Dizziness] : dizziness [Negative] : Gastrointestinal [Vision Problems] : no vision problems [Dysphagia] : no dysphagia [Nosebleeds] : no nosebleeds [Odynophagia] : no odynophagia [Chest Pain] : no chest pain [Palpitations] : no palpitations [Lower Ext Edema] : no lower extremity edema [Shortness Of Breath] : no shortness of breath [Muscle Weakness] : no muscle weakness [Confused] : no confusion [Fainting] : no fainting [Difficulty Walking] : no difficulty walking [Insomnia] : no insomnia [Anxiety] : no anxiety [Hot Flashes] : no hot flashes [FreeTextEntry5] : per HPI [de-identified] : per HPI

## 2022-11-18 NOTE — PHYSICAL EXAM
[Fully active, able to carry on all pre-disease performance without restriction] : Status 0 - Fully active, able to carry on all pre-disease performance without restriction [Obese] : obese [Normal] : affect appropriate [de-identified] : EOMI (+) mild scleral icterus [de-identified] : supple [de-identified] : (+) S1S2 RRR [de-identified] : no edema  [de-identified] : no tenderness [de-identified] : ROM intact [de-identified] : warm/dry [de-identified] : A&Ox3

## 2022-11-18 NOTE — ASSESSMENT
[FreeTextEntry1] : 35 y/o M presented April 2020 with aplastic anemia (very severe AA) confirmed on bone marrow biopsy at [facility] and now s/p inpatient treatment beginning 4/23/2020 with horse ATG + CsA + promacta with complete remission. Bone marrow bx on 11/9/20 revealed hypocellular marrow, overall cellularity improved (20-50%) showing marrow regeneration. BMBx on 5/18/2021 showing normal morphology with normal cellularity. Patient with worsening cytopenias in November 2021 and with neutropenic fever as well, sent inpatient, s/p treatment starting 12/27/21 with rabbit ATG, prednisone, cyclosporine and Promacta with partial response. 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 \par \par -Patient had remained pancytopenic requiring transfusion, but now he hasn't had PRBC transfusion in 3 weeks and hemoglobin is 8.5 today without any symptoms outside of mild fatigue on exertion. \par -Platelets over 100k now. On Promacta 75 mg daily. Will taper over time. \par -Hemolytic markers downtrending, including an impressive drop of LDH from a high of 2193 to 267 most recently. \par -Repeated abdominal US with doppler - ruled out portal vein thrombosis in July 2022 given PNH and abdominal pain. \par -Patient with history of ANSON in the past likely related to supratherapeutic cyclosporine levels. Now on 225mg BID of cyclosporine, and repeat level to be checked today.  \par -Continue Ravulizumab. Dosing [id number] at this point. \par -Meningitis vaccine series started 8/9/22 and completed on 10/4. \par -Continue lab work every 2 weeks. May be transfusion independent at this point. \par -Patient to be swabbed for COVID today although index of suspicion is not high. After appointment he had left and he was called and will get swabbed at local urgent care if his symptoms persist or get worse.\par -Discussed exercise intolerance in the setting of anemia and the risks associated. \par -Patient understands and agrees with plan. All information explained to the best of my ability.\par -RTC in 1 month

## 2022-11-18 NOTE — HISTORY OF PRESENT ILLNESS
[Disease:__________________________] : Disease: [unfilled] [Therapy: ___] : Therapy: [unfilled] [de-identified] : 37 y/o M with no PMH presented to OSH originally for c/o dizziness and feeling unwell and was then transferred to [facility] for further hematologic evaluation as patient was found to be severely pancytopenic. Patient first started noticing scattered bruising throughout his body (mostly thighs and arms) about 6 weeks prior to presentation, and also had epistaxis. Pt had also noticed intermittent fevers and chills, responsive to tylenol, and noticed very dark stools for the same time period. Also with spontaneous gum bleeding and endorsed severe fatigue and SOB with exertion. Also noted 15 lbs weight loss, partially intentional over past 2-3 months prior to presentation.  Also c/o mild headache located in posterior aspect of his head, denies sore throat, cough, +nausea but no vomiting, no abdominal pain, no diarrhea, no BRBPR, no urinary symptoms, no LE swelling, +visual changes.     \par \par Patient was transferred to [facility] from [facility] on 4/18. Patient was started on IVF, Levaquin, and allopurinol. Patient transfused blood and platelets. Bone Marrow biopsy performed on 4/20 consistent with Aplastic Anemia. 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 Because of this, patient's platelet transfusion threshold was made 50k. Horse ATG test dose was applied 4/22 with no reaction. Treatment started 4/23 consisting of equine ATG at 40mg/kg/day x 4 days, Cyclosporine 10mg/kg/day divided doses BID, Eltrombopag 150mg daily. Patient had episode of hives with ATG infusion on Day 1 thus steroid regimen was changed from oral prednisone to IV Solumedrol Q8 for the duration of ATG treatment and transitioned back to oral prednisone 1mg/kg daily for days 5-10 then tapered. The patient had grade 3 transaminitis likely from Promacta and ATG and Promacta was held as of 5/1. While patient was in the hospital, his course was also complicated by epigastric pain consistent with dyspepsia, most likely due to cyclosporine. Was managed on pepcid BID and sucralfate q6 hours and mylanta as needed with good relief. \par \par On 5/12 the patient's transaminitis had improved(grade 2 total bili, grade 1 AST, grade 2 ALT) and Promacta 150 mg po QD was resumed. Pt's cyclosporine dose was adjusted  according to the level (goal 200-400) and he was on 400 mg po q12h as of 5/18. On 5/16, patient developed chest discomfort during platelet transfusion which improved with benadryl. EKG and CXR were wnl. Patient originally refused benadryl pre-medication prior to platelets that day due to side effects with taking Benadryl, and it was decided to premedicate pt with Claritin or Zyrtec pre blood transfusion instead. On 5/20, cyclosporine level was 460, therefore dose was lowered to 300 mg PO BID. Patient's vision had improved, so platelet transfusion threshold liberalized to 20k. \par \par Patient has since had a complete recovery of blood counts. He discontinued Promacta after 6 months of therapy. He has been monitored since then wit stable CBC and therapeutic cyclosporine levels. \par \par He has since had repeat bone marrow biopsy on 5/18/2021 with normal morphology and cellularity\par \par In September 2021 noted his platelets dropping, and ultimately slowly was becoming pancytopenia. He was restarted on cyclosporine at home, but ultimately was admitted in late December with neutropenic fever. Bone marrow biopsy was repeated on 12/2021 and showed decreased megakaryocytes and other findings consistent with relapse aplastic anemia. Patient was transferred to [address] [place] [address] on 12/23 to start treatment for relapse with rabbit ATG, cyclosporine, prednisone, and eltrombopag. On 12/27 rabbit ATG test dose was given with no adverse reaction. Cyclosporine was started evening of 12/26. Rabbit ATG started 12/29, patient with reaction upon increase in flow rate with fevers, rigors. Patient re-challenged 12/30, again had reaction after completion of Day 1 dose. ATG was subsequently held, but then restarted on 1/3 and completed remaining 5 day course on 1/6. Cyclosporine levels were checked biweekly, dose adjusted as needed based on level. Ultimately was discharged on 450 mg BID cyclosporine. Patient was seen by ENT for sinusitis and treated with Unasyn thru 1/19. 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 BMBx showed erythroid predominant trilineage hematopoiesis with maturation and mildly decreased megakaryocytes. Patient ultimately given meningococcal vaccine and subsequently began treatment with loading dose of Ravulizumab (Ultomiris) on 8/25/2022. \par  [de-identified] : - Markedly hypocellular bone marrow (mostly 5% and focal 10-\par 15%) with focal minimal erythropoiesis, minimal to absent\par myelopoiesis, absent megakaryocytes, increased iron stores. Aplastic bone marrow.  [de-identified] : Cytogenetics: \par \par Result: Male karyotype\par Karyotype: 46,XY                              {12                              } [de-identified] : very severe Aplastic Anemia [FreeTextEntry1] : Ravulizumab started 8/25/22 [de-identified] : Patient seen today for follow up. Overall he is feeling well, he continues with daily Promacta and CSA 225mg twice a day. After he is here for treatment he states he loses his appetite for about a day and then he has no further issues. He has had no nausea or vomiting in a few weeks. He tries to be active and use the treadmill at home but admits to feeling slight dizziness and his heart racing after he is on it for a while. Felt he may have had a cold over the weekend, he mostly felt nasal congestion and this has improved.  No fevers, chills, night sweats. No new pain symptoms at the present time.

## 2022-11-21 ENCOUNTER — RESULT REVIEW (OUTPATIENT)
Age: 37
End: 2022-11-21

## 2022-11-21 ENCOUNTER — APPOINTMENT (OUTPATIENT)
Dept: HEMATOLOGY ONCOLOGY | Facility: CLINIC | Age: 37
End: 2022-11-21

## 2022-11-21 LAB
BASOPHILS # BLD AUTO: 0.01 K/UL — SIGNIFICANT CHANGE UP (ref 0–0.2)
BASOPHILS NFR BLD AUTO: 0.3 % — SIGNIFICANT CHANGE UP (ref 0–2)
EOSINOPHIL # BLD AUTO: 0.06 K/UL — SIGNIFICANT CHANGE UP (ref 0–0.5)
EOSINOPHIL NFR BLD AUTO: 1.9 % — SIGNIFICANT CHANGE UP (ref 0–6)
HCT VFR BLD CALC: 23.5 % — LOW (ref 39–50)
HGB BLD-MCNC: 8 G/DL — LOW (ref 13–17)
IMM GRANULOCYTES NFR BLD AUTO: 1 % — HIGH (ref 0–0.9)
LYMPHOCYTES # BLD AUTO: 1.27 K/UL — SIGNIFICANT CHANGE UP (ref 1–3.3)
LYMPHOCYTES # BLD AUTO: 40.4 % — SIGNIFICANT CHANGE UP (ref 13–44)
MCHC RBC-ENTMCNC: 34 G/DL — SIGNIFICANT CHANGE UP (ref 32–36)
MCHC RBC-ENTMCNC: 35.1 PG — HIGH (ref 27–34)
MCV RBC AUTO: 103.1 FL — HIGH (ref 80–100)
MONOCYTES # BLD AUTO: 0.35 K/UL — SIGNIFICANT CHANGE UP (ref 0–0.9)
MONOCYTES NFR BLD AUTO: 11.1 % — SIGNIFICANT CHANGE UP (ref 2–14)
NEUTROPHILS # BLD AUTO: 1.42 K/UL — LOW (ref 1.8–7.4)
NEUTROPHILS NFR BLD AUTO: 45.3 % — SIGNIFICANT CHANGE UP (ref 43–77)
NRBC # BLD: 0 /100 WBCS — SIGNIFICANT CHANGE UP (ref 0–0)
PLATELET # BLD AUTO: 126 K/UL — LOW (ref 150–400)
RBC # BLD: 2.28 M/UL — LOW (ref 4.2–5.8)
RBC # FLD: 23.9 % — HIGH (ref 10.3–14.5)
WBC # BLD: 3.14 K/UL — LOW (ref 3.8–10.5)
WBC # FLD AUTO: 3.14 K/UL — LOW (ref 3.8–10.5)

## 2022-11-23 LAB
ALBUMIN SERPL ELPH-MCNC: 4.7 G/DL
ALP BLD-CCNC: 93 U/L
ALT SERPL-CCNC: 25 U/L
ANION GAP SERPL CALC-SCNC: 13 MMOL/L
AST SERPL-CCNC: 18 U/L
BILIRUB DIRECT SERPL-MCNC: 0.5 MG/DL
BILIRUB SERPL-MCNC: 3.4 MG/DL
BUN SERPL-MCNC: 16 MG/DL
CALCIUM SERPL-MCNC: 9.2 MG/DL
CHLORIDE SERPL-SCNC: 105 MMOL/L
CO2 SERPL-SCNC: 22 MMOL/L
CREAT SERPL-MCNC: 0.92 MG/DL
EGFR: 112 ML/MIN/1.73M2
GLUCOSE SERPL-MCNC: 100 MG/DL
HAPTOGLOB SERPL-MCNC: 65 MG/DL
LDH SERPL-CCNC: 292 U/L
POTASSIUM SERPL-SCNC: 4.5 MMOL/L
PROT SERPL-MCNC: 7.6 G/DL
SODIUM SERPL-SCNC: 140 MMOL/L

## 2022-11-25 ENCOUNTER — NON-APPOINTMENT (OUTPATIENT)
Age: 37
End: 2022-11-25

## 2022-11-29 ENCOUNTER — OUTPATIENT (OUTPATIENT)
Dept: OUTPATIENT SERVICES | Facility: HOSPITAL | Age: 37
LOS: 1 days | Discharge: HOME | End: 2022-11-29

## 2022-11-29 DIAGNOSIS — D61.9 APLASTIC ANEMIA, UNSPECIFIED: ICD-10-CM

## 2022-11-29 DIAGNOSIS — Z98.890 OTHER SPECIFIED POSTPROCEDURAL STATES: Chronic | ICD-10-CM

## 2022-12-01 ENCOUNTER — RESULT REVIEW (OUTPATIENT)
Age: 37
End: 2022-12-01

## 2022-12-01 ENCOUNTER — APPOINTMENT (OUTPATIENT)
Dept: HEMATOLOGY ONCOLOGY | Facility: CLINIC | Age: 37
End: 2022-12-01

## 2022-12-01 VITALS
HEIGHT: 68.11 IN | HEART RATE: 82 BPM | BODY MASS INDEX: 34.62 KG/M2 | DIASTOLIC BLOOD PRESSURE: 83 MMHG | SYSTOLIC BLOOD PRESSURE: 131 MMHG | OXYGEN SATURATION: 98 % | RESPIRATION RATE: 16 BRPM | TEMPERATURE: 97.2 F | WEIGHT: 228.4 LBS

## 2022-12-01 LAB
BASOPHILS # BLD AUTO: 0.02 K/UL — SIGNIFICANT CHANGE UP (ref 0–0.2)
BASOPHILS NFR BLD AUTO: 0.7 % — SIGNIFICANT CHANGE UP (ref 0–2)
EOSINOPHIL # BLD AUTO: 0.08 K/UL — SIGNIFICANT CHANGE UP (ref 0–0.5)
EOSINOPHIL NFR BLD AUTO: 2.7 % — SIGNIFICANT CHANGE UP (ref 0–6)
HCT VFR BLD CALC: 23.1 % — LOW (ref 39–50)
HGB BLD-MCNC: 7.7 G/DL — LOW (ref 13–17)
IMM GRANULOCYTES NFR BLD AUTO: 1.4 % — HIGH (ref 0–0.9)
LYMPHOCYTES # BLD AUTO: 1.22 K/UL — SIGNIFICANT CHANGE UP (ref 1–3.3)
LYMPHOCYTES # BLD AUTO: 41.6 % — SIGNIFICANT CHANGE UP (ref 13–44)
MCHC RBC-ENTMCNC: 33.3 G/DL — SIGNIFICANT CHANGE UP (ref 32–36)
MCHC RBC-ENTMCNC: 36.2 PG — HIGH (ref 27–34)
MCV RBC AUTO: 108.5 FL — HIGH (ref 80–100)
MONOCYTES # BLD AUTO: 0.31 K/UL — SIGNIFICANT CHANGE UP (ref 0–0.9)
MONOCYTES NFR BLD AUTO: 10.6 % — SIGNIFICANT CHANGE UP (ref 2–14)
NEUTROPHILS # BLD AUTO: 1.26 K/UL — LOW (ref 1.8–7.4)
NEUTROPHILS NFR BLD AUTO: 43 % — SIGNIFICANT CHANGE UP (ref 43–77)
NRBC # BLD: 0 /100 WBCS — SIGNIFICANT CHANGE UP (ref 0–0)
PLATELET # BLD AUTO: 112 K/UL — LOW (ref 150–400)
RBC # BLD: 2.13 M/UL — LOW (ref 4.2–5.8)
RBC # FLD: 24 % — HIGH (ref 10.3–14.5)
WBC # BLD: 2.93 K/UL — LOW (ref 3.8–10.5)
WBC # FLD AUTO: 2.93 K/UL — LOW (ref 3.8–10.5)

## 2022-12-01 PROCEDURE — 99214 OFFICE O/P EST MOD 30 MIN: CPT

## 2022-12-02 ENCOUNTER — APPOINTMENT (OUTPATIENT)
Dept: INFUSION THERAPY | Facility: HOSPITAL | Age: 37
End: 2022-12-02

## 2022-12-02 NOTE — PHYSICAL EXAM
[Fully active, able to carry on all pre-disease performance without restriction] : Status 0 - Fully active, able to carry on all pre-disease performance without restriction [Obese] : obese [Normal] : affect appropriate [de-identified] : EOMI (+) mild scleral icterus [de-identified] : supple [de-identified] : (+) S1S2 RRR [de-identified] : no edema  [de-identified] : no tenderness [de-identified] : ROM intact [de-identified] : warm/dry [de-identified] : A&Ox3

## 2022-12-02 NOTE — ASSESSMENT
[FreeTextEntry1] : 36 y/o M presented April 2020 with aplastic anemia (very severe AA) confirmed on bone marrow biopsy at [facility] and now s/p inpatient treatment beginning 4/23/2020 with horse ATG + CsA + promacta with complete remission. Bone marrow bx on 11/9/20 revealed hypocellular marrow, overall cellularity improved (20-50%) showing marrow regeneration. BMBx on 5/18/2021 showing normal morphology with normal cellularity. Patient with worsening cytopenias in November 2021 and with neutropenic fever as well, sent inpatient, s/p treatment starting 12/27/21 with rabbit ATG, prednisone, cyclosporine and Promacta with partial response. 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 \par \par -Patient had remained pancytopenic requiring transfusion, transfusion requirements definitively decreased ut hemoglobin <8 today and planned for 2 units PRBC tomorrow - will check folic acid levels (and B12) as MCV elevated but no longer actively hemolyzing - will precribe folic acid supplementation. \par -Platelets over 100k now. \par -Hemolytic markers downtrended, including an impressive drop of LD. \par -Repeated abdominal US with doppler - ruled out portal vein thrombosis in July 2022 given PNH and abdominal pain. \par -Patient with history of ANSON in the past likely related to supratherapeutic cyclosporine levels. Now on 225mg BID of cyclosporine repeat level was 171. Repeat level to be checked today.  \par -Continue Ravulizumab. Dosing [id number] at this point. \par -Meningitis vaccine series started 8/9/22 and completed on 10/4. \par -Continue lab work every 2 weeks.\par -Begin taking folic acid 1 Mg twice daily for 2 weeks and then once daily. \par -Patient understands and agrees with plan. All information explained to the best of my ability.\par -RTC in 1 month

## 2022-12-02 NOTE — HISTORY OF PRESENT ILLNESS
[Disease:__________________________] : Disease: [unfilled] [Therapy: ___] : Therapy: [unfilled] [de-identified] : 35 y/o M with no PMH presented to OSH originally for c/o dizziness and feeling unwell and was then transferred to [facility] for further hematologic evaluation as patient was found to be severely pancytopenic. Patient first started noticing scattered bruising throughout his body (mostly thighs and arms) about 6 weeks prior to presentation, and also had epistaxis. Pt had also noticed intermittent fevers and chills, responsive to tylenol, and noticed very dark stools for the same time period. Also with spontaneous gum bleeding and endorsed severe fatigue and SOB with exertion. Also noted 15 lbs weight loss, partially intentional over past 2-3 months prior to presentation.  Also c/o mild headache located in posterior aspect of his head, denies sore throat, cough, +nausea but no vomiting, no abdominal pain, no diarrhea, no BRBPR, no urinary symptoms, no LE swelling, +visual changes.     \par \par Patient was transferred to [facility] from [facility] on 4/18. Patient was started on IVF, Levaquin, and allopurinol. Patient transfused blood and platelets. Bone Marrow biopsy performed on 4/20 consistent with Aplastic Anemia. 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 Because of this, patient's platelet transfusion threshold was made 50k. Horse ATG test dose was applied 4/22 with no reaction. Treatment started 4/23 consisting of equine ATG at 40mg/kg/day x 4 days, Cyclosporine 10mg/kg/day divided doses BID, Eltrombopag 150mg daily. Patient had episode of hives with ATG infusion on Day 1 thus steroid regimen was changed from oral prednisone to IV Solumedrol Q8 for the duration of ATG treatment and transitioned back to oral prednisone 1mg/kg daily for days 5-10 then tapered. The patient had grade 3 transaminitis likely from Promacta and ATG and Promacta was held as of 5/1. While patient was in the hospital, his course was also complicated by epigastric pain consistent with dyspepsia, most likely due to cyclosporine. Was managed on pepcid BID and sucralfate q6 hours and mylanta as needed with good relief. \par \par On 5/12 the patient's transaminitis had improved(grade 2 total bili, grade 1 AST, grade 2 ALT) and Promacta 150 mg po QD was resumed. Pt's cyclosporine dose was adjusted  according to the level (goal 200-400) and he was on 400 mg po q12h as of 5/18. On 5/16, patient developed chest discomfort during platelet transfusion which improved with benadryl. EKG and CXR were wnl. Patient originally refused benadryl pre-medication prior to platelets that day due to side effects with taking Benadryl, and it was decided to premedicate pt with Claritin or Zyrtec pre blood transfusion instead. On 5/20, cyclosporine level was 460, therefore dose was lowered to 300 mg PO BID. Patient's vision had improved, so platelet transfusion threshold liberalized to 20k. \par \par Patient has since had a complete recovery of blood counts. He discontinued Promacta after 6 months of therapy. He has been monitored since then wit stable CBC and therapeutic cyclosporine levels. \par \par He has since had repeat bone marrow biopsy on 5/18/2021 with normal morphology and cellularity\par \par In September 2021 noted his platelets dropping, and ultimately slowly was becoming pancytopenia. He was restarted on cyclosporine at home, but ultimately was admitted in late December with neutropenic fever. Bone marrow biopsy was repeated on 12/2021 and showed decreased megakaryocytes and other findings consistent with relapse aplastic anemia. Patient was transferred to [address] [place] [address] on 12/23 to start treatment for relapse with rabbit ATG, cyclosporine, prednisone, and eltrombopag. On 12/27 rabbit ATG test dose was given with no adverse reaction. Cyclosporine was started evening of 12/26. Rabbit ATG started 12/29, patient with reaction upon increase in flow rate with fevers, rigors. Patient re-challenged 12/30, again had reaction after completion of Day 1 dose. ATG was subsequently held, but then restarted on 1/3 and completed remaining 5 day course on 1/6. Cyclosporine levels were checked biweekly, dose adjusted as needed based on level. Ultimately was discharged on 450 mg BID cyclosporine. Patient was seen by ENT for sinusitis and treated with Unasyn thru 1/19. 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 BMBx showed erythroid predominant trilineage hematopoiesis with maturation and mildly decreased megakaryocytes. Patient ultimately given meningococcal vaccine and subsequently began treatment with loading dose of Ravulizumab (Ultomiris) on 8/25/2022. \par  [de-identified] : - Markedly hypocellular bone marrow (mostly 5% and focal 10-\par 15%) with focal minimal erythropoiesis, minimal to absent\par myelopoiesis, absent megakaryocytes, increased iron stores. Aplastic bone marrow.  [de-identified] : Cytogenetics: \par \par Result: Male karyotype\par Karyotype: 46,XY                               {12                               } [de-identified] : very severe Aplastic Anemia [FreeTextEntry1] : Ravulizumab started 8/25/22 [de-identified] : Patient presented for follow up on 12/01/2022. Patient has a transfusion scheduled tomorrow. Patient felt fatigued, he continues to take cyclosporine. Patient got dizzy on 11/28/22 when he was getting ready for work which has never happened before, he can't remember how long it lasted. The room was spinning for him for the rest of the day. No abdominal pain, n/v/d. No fevers, chills, night sweats.  No new pain symptoms at the present time. Good appetite, lost 2 lbs.

## 2022-12-02 NOTE — REVIEW OF SYSTEMS
[Dizziness] : dizziness [Negative] : Heme/Lymph [Vision Problems] : no vision problems [Dysphagia] : no dysphagia [Nosebleeds] : no nosebleeds [Odynophagia] : no odynophagia [Chest Pain] : no chest pain [Palpitations] : no palpitations [Lower Ext Edema] : no lower extremity edema [Shortness Of Breath] : no shortness of breath [Muscle Weakness] : no muscle weakness [Confused] : no confusion [Fainting] : no fainting [Difficulty Walking] : no difficulty walking [Insomnia] : no insomnia [Anxiety] : no anxiety [Hot Flashes] : no hot flashes [FreeTextEntry5] : per HPI [de-identified] : per HPI

## 2022-12-05 ENCOUNTER — APPOINTMENT (OUTPATIENT)
Dept: HEMATOLOGY ONCOLOGY | Facility: CLINIC | Age: 37
End: 2022-12-05

## 2022-12-05 DIAGNOSIS — Z51.89 ENCOUNTER FOR OTHER SPECIFIED AFTERCARE: ICD-10-CM

## 2022-12-05 DIAGNOSIS — Z51.11 ENCOUNTER FOR ANTINEOPLASTIC CHEMOTHERAPY: ICD-10-CM

## 2022-12-05 DIAGNOSIS — R11.2 NAUSEA WITH VOMITING, UNSPECIFIED: ICD-10-CM

## 2022-12-07 NOTE — DIETITIAN INITIAL EVALUATION ADULT. - PHYSICAL ASSESSMENT THIGH
Accupril changed to lisinopril 40.  Prescription sent to pharmacy.  Patient will need to be notify that his previous medicine has been recalled and that a replacement will be sent   none

## 2022-12-14 LAB
ALBUMIN SERPL ELPH-MCNC: 4.7 G/DL
ALP BLD-CCNC: 88 U/L
ALT SERPL-CCNC: 42 U/L
ANION GAP SERPL CALC-SCNC: 12 MMOL/L
AST SERPL-CCNC: 24 U/L
BILIRUB SERPL-MCNC: 2.6 MG/DL
BUN SERPL-MCNC: 17 MG/DL
CALCIUM SERPL-MCNC: 9.6 MG/DL
CHLORIDE SERPL-SCNC: 104 MMOL/L
CO2 SERPL-SCNC: 22 MMOL/L
CREAT SERPL-MCNC: 0.9 MG/DL
CYCLOSPORINE SER-MCNC: 109 NG/ML
EGFR: 114 ML/MIN/1.73M2
FOLATE SERPL-MCNC: 12.8 NG/ML
GLUCOSE SERPL-MCNC: 94 MG/DL
LDH SERPL-CCNC: 295 U/L
POTASSIUM SERPL-SCNC: 4.5 MMOL/L
PROT SERPL-MCNC: 7.2 G/DL
SODIUM SERPL-SCNC: 138 MMOL/L
VIT B12 SERPL-MCNC: 304 PG/ML

## 2022-12-29 ENCOUNTER — APPOINTMENT (OUTPATIENT)
Dept: HEMATOLOGY ONCOLOGY | Facility: CLINIC | Age: 37
End: 2022-12-29
Payer: COMMERCIAL

## 2022-12-29 ENCOUNTER — RESULT REVIEW (OUTPATIENT)
Age: 37
End: 2022-12-29

## 2022-12-29 ENCOUNTER — APPOINTMENT (OUTPATIENT)
Dept: INFUSION THERAPY | Facility: HOSPITAL | Age: 37
End: 2022-12-29

## 2022-12-29 LAB
ALBUMIN SERPL ELPH-MCNC: 4.6 G/DL — SIGNIFICANT CHANGE UP (ref 3.3–5)
ALP SERPL-CCNC: 93 U/L — SIGNIFICANT CHANGE UP (ref 40–120)
ALT FLD-CCNC: 31 U/L — SIGNIFICANT CHANGE UP (ref 10–45)
ANION GAP SERPL CALC-SCNC: 11 MMOL/L — SIGNIFICANT CHANGE UP (ref 5–17)
AST SERPL-CCNC: 19 U/L — SIGNIFICANT CHANGE UP (ref 10–40)
BASOPHILS # BLD AUTO: 0.01 K/UL — SIGNIFICANT CHANGE UP (ref 0–0.2)
BASOPHILS NFR BLD AUTO: 0.3 % — SIGNIFICANT CHANGE UP (ref 0–2)
BILIRUB SERPL-MCNC: 2.3 MG/DL — HIGH (ref 0.2–1.2)
BUN SERPL-MCNC: 17 MG/DL — SIGNIFICANT CHANGE UP (ref 7–23)
CALCIUM SERPL-MCNC: 8.8 MG/DL — SIGNIFICANT CHANGE UP (ref 8.4–10.5)
CHLORIDE SERPL-SCNC: 107 MMOL/L — SIGNIFICANT CHANGE UP (ref 96–108)
CO2 SERPL-SCNC: 24 MMOL/L — SIGNIFICANT CHANGE UP (ref 22–31)
CREAT SERPL-MCNC: 0.92 MG/DL — SIGNIFICANT CHANGE UP (ref 0.5–1.3)
EGFR: 111 ML/MIN/1.73M2 — SIGNIFICANT CHANGE UP
EOSINOPHIL # BLD AUTO: 0.06 K/UL — SIGNIFICANT CHANGE UP (ref 0–0.5)
EOSINOPHIL NFR BLD AUTO: 1.7 % — SIGNIFICANT CHANGE UP (ref 0–6)
GLUCOSE SERPL-MCNC: 98 MG/DL — SIGNIFICANT CHANGE UP (ref 70–99)
HCT VFR BLD CALC: 24.7 % — LOW (ref 39–50)
HGB BLD-MCNC: 8.6 G/DL — LOW (ref 13–17)
IMM GRANULOCYTES NFR BLD AUTO: 2 % — HIGH (ref 0–0.9)
LDH SERPL L TO P-CCNC: 303 U/L — HIGH (ref 50–242)
LYMPHOCYTES # BLD AUTO: 1.61 K/UL — SIGNIFICANT CHANGE UP (ref 1–3.3)
LYMPHOCYTES # BLD AUTO: 46 % — HIGH (ref 13–44)
MCHC RBC-ENTMCNC: 34.8 G/DL — SIGNIFICANT CHANGE UP (ref 32–36)
MCHC RBC-ENTMCNC: 35.5 PG — HIGH (ref 27–34)
MCV RBC AUTO: 102.1 FL — HIGH (ref 80–100)
MONOCYTES # BLD AUTO: 0.32 K/UL — SIGNIFICANT CHANGE UP (ref 0–0.9)
MONOCYTES NFR BLD AUTO: 9.1 % — SIGNIFICANT CHANGE UP (ref 2–14)
NEUTROPHILS # BLD AUTO: 1.43 K/UL — LOW (ref 1.8–7.4)
NEUTROPHILS NFR BLD AUTO: 40.9 % — LOW (ref 43–77)
NRBC # BLD: 0 /100 WBCS — SIGNIFICANT CHANGE UP (ref 0–0)
PLATELET # BLD AUTO: 155 K/UL — SIGNIFICANT CHANGE UP (ref 150–400)
POTASSIUM SERPL-MCNC: 4.3 MMOL/L — SIGNIFICANT CHANGE UP (ref 3.5–5.3)
POTASSIUM SERPL-SCNC: 4.3 MMOL/L — SIGNIFICANT CHANGE UP (ref 3.5–5.3)
PROT SERPL-MCNC: 7.1 G/DL — SIGNIFICANT CHANGE UP (ref 6–8.3)
RBC # BLD: 2.42 M/UL — LOW (ref 4.2–5.8)
RBC # FLD: 21 % — HIGH (ref 10.3–14.5)
SODIUM SERPL-SCNC: 142 MMOL/L — SIGNIFICANT CHANGE UP (ref 135–145)
WBC # BLD: 3.5 K/UL — LOW (ref 3.8–10.5)
WBC # FLD AUTO: 3.5 K/UL — LOW (ref 3.8–10.5)

## 2022-12-29 PROCEDURE — 86900 BLOOD TYPING SEROLOGIC ABO: CPT

## 2022-12-29 PROCEDURE — 99214 OFFICE O/P EST MOD 30 MIN: CPT

## 2022-12-29 PROCEDURE — 86901 BLOOD TYPING SEROLOGIC RH(D): CPT

## 2022-12-29 PROCEDURE — 86923 COMPATIBILITY TEST ELECTRIC: CPT

## 2022-12-29 PROCEDURE — 86850 RBC ANTIBODY SCREEN: CPT

## 2023-01-06 NOTE — PHYSICAL EXAM
[Fully active, able to carry on all pre-disease performance without restriction] : Status 0 - Fully active, able to carry on all pre-disease performance without restriction [Obese] : obese [Normal] : affect appropriate [de-identified] : EOMI (+) mild scleral icterus [de-identified] : supple [de-identified] : (+) S1S2 RRR [de-identified] : no edema  [de-identified] : no tenderness [de-identified] : ROM intact [de-identified] : warm/dry [de-identified] : A&Ox3

## 2023-01-06 NOTE — HISTORY OF PRESENT ILLNESS
[Disease:__________________________] : Disease: [unfilled] [Therapy: ___] : Therapy: [unfilled] [de-identified] : 38 y/o M with no PMH presented to OSH originally for c/o dizziness and feeling unwell and was then transferred to [facility] for further hematologic evaluation as patient was found to be severely pancytopenic. Patient first started noticing scattered bruising throughout his body (mostly thighs and arms) about 6 weeks prior to presentation, and also had epistaxis. Pt had also noticed intermittent fevers and chills, responsive to tylenol, and noticed very dark stools for the same time period. Also with spontaneous gum bleeding and endorsed severe fatigue and SOB with exertion. Also noted 15 lbs weight loss, partially intentional over past 2-3 months prior to presentation.  Also c/o mild headache located in posterior aspect of his head, denies sore throat, cough, +nausea but no vomiting, no abdominal pain, no diarrhea, no BRBPR, no urinary symptoms, no LE swelling, +visual changes.     \par \par Patient was transferred to [facility] from [facility] on 4/18. Patient was started on IVF, Levaquin, and allopurinol. Patient transfused blood and platelets. Bone Marrow biopsy performed on 4/20 consistent with Aplastic Anemia. 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 Because of this, patient's platelet transfusion threshold was made 50k. Horse ATG test dose was applied 4/22 with no reaction. Treatment started 4/23 consisting of equine ATG at 40mg/kg/day x 4 days, Cyclosporine 10mg/kg/day divided doses BID, Eltrombopag 150mg daily. Patient had episode of hives with ATG infusion on Day 1 thus steroid regimen was changed from oral prednisone to IV Solumedrol Q8 for the duration of ATG treatment and transitioned back to oral prednisone 1mg/kg daily for days 5-10 then tapered. The patient had grade 3 transaminitis likely from Promacta and ATG and Promacta was held as of 5/1. While patient was in the hospital, his course was also complicated by epigastric pain consistent with dyspepsia, most likely due to cyclosporine. Was managed on pepcid BID and sucralfate q6 hours and mylanta as needed with good relief. \par \par On 5/12 the patient's transaminitis had improved(grade 2 total bili, grade 1 AST, grade 2 ALT) and Promacta 150 mg po QD was resumed. Pt's cyclosporine dose was adjusted  according to the level (goal 200-400) and he was on 400 mg po q12h as of 5/18. On 5/16, patient developed chest discomfort during platelet transfusion which improved with benadryl. EKG and CXR were wnl. Patient originally refused benadryl pre-medication prior to platelets that day due to side effects with taking Benadryl, and it was decided to premedicate pt with Claritin or Zyrtec pre blood transfusion instead. On 5/20, cyclosporine level was 460, therefore dose was lowered to 300 mg PO BID. Patient's vision had improved, so platelet transfusion threshold liberalized to 20k. \par \par Patient has since had a complete recovery of blood counts. He discontinued Promacta after 6 months of therapy. He has been monitored since then wit stable CBC and therapeutic cyclosporine levels. \par \par He has since had repeat bone marrow biopsy on 5/18/2021 with normal morphology and cellularity\par \par In September 2021 noted his platelets dropping, and ultimately slowly was becoming pancytopenia. He was restarted on cyclosporine at home, but ultimately was admitted in late December with neutropenic fever. Bone marrow biopsy was repeated on 12/2021 and showed decreased megakaryocytes and other findings consistent with relapse aplastic anemia. Patient was transferred to [address] [place] [address] on 12/23 to start treatment for relapse with rabbit ATG, cyclosporine, prednisone, and eltrombopag. On 12/27 rabbit ATG test dose was given with no adverse reaction. Cyclosporine was started evening of 12/26. Rabbit ATG started 12/29, patient with reaction upon increase in flow rate with fevers, rigors. Patient re-challenged 12/30, again had reaction after completion of Day 1 dose. ATG was subsequently held, but then restarted on 1/3 and completed remaining 5 day course on 1/6. Cyclosporine levels were checked biweekly, dose adjusted as needed based on level. Ultimately was discharged on 450 mg BID cyclosporine. Patient was seen by ENT for sinusitis and treated with Unasyn thru 1/19. 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 BMBx showed erythroid predominant trilineage hematopoiesis with maturation and mildly decreased megakaryocytes. Patient ultimately given meningococcal vaccine and subsequently began treatment with loading dose of Ravulizumab (Ultomiris) on 8/25/2022. \par  [de-identified] : - Markedly hypocellular bone marrow (mostly 5% and focal 10-\par 15%) with focal minimal erythropoiesis, minimal to absent\par myelopoiesis, absent megakaryocytes, increased iron stores. Aplastic bone marrow.  [de-identified] : Cytogenetics: \par \par Result: Male karyotype\par Karyotype: 46,XY                                {12                                } [de-identified] : very severe Aplastic Anemia [FreeTextEntry1] : Ravulizumab started 8/25/22 [de-identified] : Patient presented for follow up and treatment today. Overall patient is in his usual state of health. He last received blood (2 units) on 12/1. Since then he had felt better and more energy like usual after receiving transfusions but this time he did not feel that his fatigue and energy levels improved for as long as they had in the past. Today he does feel a little fatigued. He continues to have exertional dyspnea walking up the stairs and will feel his heart beating faster at that time which has been ongoing, denies CP. Continues to take cyclosporine. He is no longer feeling dizzy. No abdominal pain, n/v/d. No fevers, chills, night sweats.  No new pain symptoms at the present time. Good appetite.

## 2023-01-06 NOTE — ASSESSMENT
[FreeTextEntry1] : 38 y/o M presented April 2020 with aplastic anemia (very severe AA) confirmed on bone marrow biopsy at [facility] and now s/p inpatient treatment beginning 4/23/2020 with horse ATG + CsA + promacta with complete remission. Bone marrow bx on 11/9/20 revealed hypocellular marrow, overall cellularity improved (20-50%) showing marrow regeneration. BMBx on 5/18/2021 showing normal morphology with normal cellularity. Patient with worsening cytopenias in November 2021 and with neutropenic fever as well, sent inpatient, s/p treatment starting 12/27/21 with rabbit ATG, prednisone, cyclosporine and Promacta with partial response. 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 \par \par -Patient had remained pancytopenic requiring transfusion, transfusion requirements definitively decreased.\par -Hgb today 8.6.\par -Continue folic acid daily as MCV elevated but no longer actively hemolyzing. MCV today decreased to 102.1.\par -Platelets over 100k now. \par -Hemolytic markers downtrended, including an impressive drop of LD. \par -Repeated abdominal US with doppler - ruled out portal vein thrombosis in July 2022 given PNH and abdominal pain. \par -Patient with history of ANSON in the past likely related to supratherapeutic cyclosporine levels. Now on 225mg BID of cyclosporine repeat level was 171. Repeat level to be checked today.  \par -Continue Ravulizumab. Dosing [id number] at this point. \par -Meningitis vaccine series started 8/9/22 and completed on 10/4. \par -Continue lab work every 2 weeks.\par -Discussed meeting with transplant team and patient in agreement. Will send referral. \par -Patient understands and agrees with plan. All information explained to the best of my ability.\par -RTC in 1 month

## 2023-01-06 NOTE — REVIEW OF SYSTEMS
[Fatigue] : fatigue [SOB on Exertion] : shortness of breath during exertion [Negative] : Neurological [Fever] : no fever [Chills] : no chills [Night Sweats] : no night sweats [Recent Change In Weight] : ~T no recent weight change [Vision Problems] : no vision problems [Dysphagia] : no dysphagia [Nosebleeds] : no nosebleeds [Odynophagia] : no odynophagia [Chest Pain] : no chest pain [Palpitations] : no palpitations [Lower Ext Edema] : no lower extremity edema [Shortness Of Breath] : no shortness of breath [Wheezing] : no wheezing [Cough] : no cough [Muscle Weakness] : no muscle weakness [Insomnia] : no insomnia [Anxiety] : no anxiety [Hot Flashes] : no hot flashes [FreeTextEntry5] : per HPI

## 2023-01-12 ENCOUNTER — RESULT REVIEW (OUTPATIENT)
Age: 38
End: 2023-01-12

## 2023-01-12 ENCOUNTER — APPOINTMENT (OUTPATIENT)
Dept: HEMATOLOGY ONCOLOGY | Facility: CLINIC | Age: 38
End: 2023-01-12

## 2023-01-12 ENCOUNTER — OUTPATIENT (OUTPATIENT)
Dept: OUTPATIENT SERVICES | Facility: HOSPITAL | Age: 38
LOS: 1 days | End: 2023-01-12
Payer: COMMERCIAL

## 2023-01-12 DIAGNOSIS — D61.9 APLASTIC ANEMIA, UNSPECIFIED: ICD-10-CM

## 2023-01-12 DIAGNOSIS — Z98.890 OTHER SPECIFIED POSTPROCEDURAL STATES: Chronic | ICD-10-CM

## 2023-01-12 LAB
ANISOCYTOSIS BLD QL: SLIGHT — SIGNIFICANT CHANGE UP
BASOPHILS # BLD AUTO: 0.03 K/UL — SIGNIFICANT CHANGE UP (ref 0–0.2)
BASOPHILS NFR BLD AUTO: 1 % — SIGNIFICANT CHANGE UP (ref 0–2)
DACRYOCYTES BLD QL SMEAR: SLIGHT — SIGNIFICANT CHANGE UP
EOSINOPHIL # BLD AUTO: 0.06 K/UL — SIGNIFICANT CHANGE UP (ref 0–0.5)
EOSINOPHIL NFR BLD AUTO: 2 % — SIGNIFICANT CHANGE UP (ref 0–6)
HCT VFR BLD CALC: 24.8 % — LOW (ref 39–50)
HGB BLD-MCNC: 8.4 G/DL — LOW (ref 13–17)
LYMPHOCYTES # BLD AUTO: 1.41 K/UL — SIGNIFICANT CHANGE UP (ref 1–3.3)
LYMPHOCYTES # BLD AUTO: 48 % — HIGH (ref 13–44)
MACROCYTES BLD QL: SLIGHT — SIGNIFICANT CHANGE UP
MCHC RBC-ENTMCNC: 33.9 G/DL — SIGNIFICANT CHANGE UP (ref 32–36)
MCHC RBC-ENTMCNC: 35.7 PG — HIGH (ref 27–34)
MCV RBC AUTO: 105.5 FL — HIGH (ref 80–100)
METAMYELOCYTES # FLD: 1 % — HIGH (ref 0–0)
MONOCYTES # BLD AUTO: 0.26 K/UL — SIGNIFICANT CHANGE UP (ref 0–0.9)
MONOCYTES NFR BLD AUTO: 9 % — SIGNIFICANT CHANGE UP (ref 2–14)
MYELOCYTES NFR BLD: 1 % — HIGH (ref 0–0)
NEUTROPHILS # BLD AUTO: 1.12 K/UL — LOW (ref 1.8–7.4)
NEUTROPHILS NFR BLD AUTO: 38 % — LOW (ref 43–77)
NRBC # BLD: 0 /100 — SIGNIFICANT CHANGE UP (ref 0–0)
NRBC # BLD: SIGNIFICANT CHANGE UP /100 WBCS (ref 0–0)
PLAT MORPH BLD: NORMAL — SIGNIFICANT CHANGE UP
PLATELET # BLD AUTO: 143 K/UL — LOW (ref 150–400)
POIKILOCYTOSIS BLD QL AUTO: SLIGHT — SIGNIFICANT CHANGE UP
POLYCHROMASIA BLD QL SMEAR: SLIGHT — SIGNIFICANT CHANGE UP
RBC # BLD: 2.35 M/UL — LOW (ref 4.2–5.8)
RBC # FLD: 21.9 % — HIGH (ref 10.3–14.5)
RBC BLD AUTO: ABNORMAL
WBC # BLD: 2.94 K/UL — LOW (ref 3.8–10.5)
WBC # FLD AUTO: 2.94 K/UL — LOW (ref 3.8–10.5)

## 2023-01-20 ENCOUNTER — OUTPATIENT (OUTPATIENT)
Dept: OUTPATIENT SERVICES | Facility: HOSPITAL | Age: 38
LOS: 1 days | Discharge: HOME | End: 2023-01-20

## 2023-01-20 DIAGNOSIS — D61.9 APLASTIC ANEMIA, UNSPECIFIED: ICD-10-CM

## 2023-01-20 DIAGNOSIS — Z98.890 OTHER SPECIFIED POSTPROCEDURAL STATES: Chronic | ICD-10-CM

## 2023-01-25 LAB
ALBUMIN SERPL ELPH-MCNC: 4.8 G/DL
ALP BLD-CCNC: 89 U/L
ALT SERPL-CCNC: 40 U/L
ANION GAP SERPL CALC-SCNC: 12 MMOL/L
AST SERPL-CCNC: 23 U/L
BILIRUB SERPL-MCNC: 3.1 MG/DL
BUN SERPL-MCNC: 19 MG/DL
CALCIUM SERPL-MCNC: 9.2 MG/DL
CHLORIDE SERPL-SCNC: 105 MMOL/L
CO2 SERPL-SCNC: 24 MMOL/L
CREAT SERPL-MCNC: 1.04 MG/DL
CYCLOSPORINE SER-MCNC: 138 NG/ML
EGFR: 96 ML/MIN/1.73M2
GLUCOSE SERPL-MCNC: 130 MG/DL
LDH SERPL-CCNC: 265 U/L
POTASSIUM SERPL-SCNC: 4.5 MMOL/L
PROT SERPL-MCNC: 7.4 G/DL
SODIUM SERPL-SCNC: 141 MMOL/L

## 2023-01-26 ENCOUNTER — APPOINTMENT (OUTPATIENT)
Dept: HEMATOLOGY ONCOLOGY | Facility: CLINIC | Age: 38
End: 2023-01-26

## 2023-01-27 ENCOUNTER — RESULT REVIEW (OUTPATIENT)
Age: 38
End: 2023-01-27

## 2023-01-27 ENCOUNTER — APPOINTMENT (OUTPATIENT)
Dept: HEMATOLOGY ONCOLOGY | Facility: CLINIC | Age: 38
End: 2023-01-27
Payer: COMMERCIAL

## 2023-01-27 VITALS
OXYGEN SATURATION: 99 % | TEMPERATURE: 97.9 F | DIASTOLIC BLOOD PRESSURE: 88 MMHG | SYSTOLIC BLOOD PRESSURE: 138 MMHG | BODY MASS INDEX: 34.08 KG/M2 | RESPIRATION RATE: 16 BRPM | WEIGHT: 224.87 LBS | HEART RATE: 85 BPM

## 2023-01-27 LAB
BASOPHILS # BLD AUTO: 0.01 K/UL — SIGNIFICANT CHANGE UP (ref 0–0.2)
BASOPHILS NFR BLD AUTO: 0.4 % — SIGNIFICANT CHANGE UP (ref 0–2)
EOSINOPHIL # BLD AUTO: 0.05 K/UL — SIGNIFICANT CHANGE UP (ref 0–0.5)
EOSINOPHIL NFR BLD AUTO: 1.9 % — SIGNIFICANT CHANGE UP (ref 0–6)
HCT VFR BLD CALC: 24 % — LOW (ref 39–50)
HGB BLD-MCNC: 8.1 G/DL — LOW (ref 13–17)
IMM GRANULOCYTES NFR BLD AUTO: 0.7 % — SIGNIFICANT CHANGE UP (ref 0–0.9)
LYMPHOCYTES # BLD AUTO: 1.18 K/UL — SIGNIFICANT CHANGE UP (ref 1–3.3)
LYMPHOCYTES # BLD AUTO: 43.9 % — SIGNIFICANT CHANGE UP (ref 13–44)
MCHC RBC-ENTMCNC: 33.8 G/DL — SIGNIFICANT CHANGE UP (ref 32–36)
MCHC RBC-ENTMCNC: 36.8 PG — HIGH (ref 27–34)
MCV RBC AUTO: 109.1 FL — HIGH (ref 80–100)
MONOCYTES # BLD AUTO: 0.29 K/UL — SIGNIFICANT CHANGE UP (ref 0–0.9)
MONOCYTES NFR BLD AUTO: 10.8 % — SIGNIFICANT CHANGE UP (ref 2–14)
NEUTROPHILS # BLD AUTO: 1.14 K/UL — LOW (ref 1.8–7.4)
NEUTROPHILS NFR BLD AUTO: 42.3 % — LOW (ref 43–77)
NRBC # BLD: 0 /100 WBCS — SIGNIFICANT CHANGE UP (ref 0–0)
PLATELET # BLD AUTO: 135 K/UL — LOW (ref 150–400)
RBC # BLD: 2.2 M/UL — LOW (ref 4.2–5.8)
RBC # FLD: 21.8 % — HIGH (ref 10.3–14.5)
WBC # BLD: 2.69 K/UL — LOW (ref 3.8–10.5)
WBC # FLD AUTO: 2.69 K/UL — LOW (ref 3.8–10.5)

## 2023-01-27 PROCEDURE — 99215 OFFICE O/P EST HI 40 MIN: CPT

## 2023-01-30 ENCOUNTER — NON-APPOINTMENT (OUTPATIENT)
Age: 38
End: 2023-01-30

## 2023-02-01 NOTE — ASSESSMENT
[FreeTextEntry1] : 38 y/o M with no significant PMHx diagnosed with very severe aplastic anemia in April 2020. H confirmed on bone marrow biopsy at [facility] and now s/p inpatient treatment beginning 4/23/2020 with horse ATG + CsA + promacta with complete remission. Bone marrow bx on 11/9/20 revealed hypocellular marrow, overall cellularity improved (20-50%) showing marrow regeneration. BMBx on 5/18/2021 showing normal morphology with normal cellularity. Unfortunately patient relapsed in November 2021 with worsening cytopenias and neutropenic fever. He was admitted for treatment with rabbit ATG, prednisone, cyclosporine and Promacta with partial response  starting 12/27/21. 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 He is currently asymptomatic.\par \par We had a long discussion regarding the risks, benefits, and timing of allogeneic stem cell transplantation in aplastic anemia.   We reviewed his current disease status. Upfront IST has a complete response rate of about 50% with a high risk of relapse of about 35. However, a consolidative allogeneic stem cell transplant can be a curative treatment with 90% success (Junior et al, Blood Advances August 2018).\par \par He has 1 sister and 1 half brother. He does not have any children. He will provided HLA samples for his siblings.  He may have a matched related donor or haplo donor options. His HLA typing is in process. He is of [place] descent making it less likely he will have an unrelated donor in the registry, we will perform a preliminary search to determine unrelated options. We briefly discussed haplo transplants. He understands that as we know his donor options, we will discuss the applicable options in more detail.\par  \par \par We reviewed that an allo-transplant involves chemotherapy +/- radiation as conditioning regimen to ablate the patient's pathologic hematopoietic stem cells as well as to suppress the patient's immune system to allow engraftment.  We reviewed that conditioning treatment may be complicated by prolonged cytopenias, infections, nausea, vomiting, diarrhea, and mouth ulcers.  To reduce his risk of infection we will start prophylactic antibacterial, fungal and viral medications while he is immunosuppressed.  After conditioning the patient is given healthy donor hematopoietic stem cells.   We reviewed that Allo-SCT is a type of immunotherapy, in which lymphocytes mediate a graft vs. tumor effect.  However, donor lymphocytes may also target host tissues in the skin, GI tract, and other areas, which is known as GVHD.  In matched related and unrelated transplants, there is a 40% chance of some acute GVHD with 10% severe and 60% chance of some chronic GVHD with 15% severe. He understands that the prevention strategy/medications will depend on the donor/protocol.\par We discussed that the entire bone marrow transplant may require up to 4-6 weeks admission but often may take up to 1 year for patients to return to their baseline level of functioning. \par \par We reviewed that there are three factors to consider prior to proceeding with bone marrow transplantation: Patient, disease, and graft related factors.  We reviewed that outcomes with transplant are better the less allo antibodies and organ damage he has from iron at the time of transplant. \par \par Regarding patient related factors, we discussed that patients with fewer comorbidities have less complications with transplant and improved survival. The patient still has to undergo pretransplant workup including echocardiogram, MUGA, sinus xray, 24 hr urine, and PFTs.    \par \par Regarding graft related factors, we discussed that an improved match will decrease his risk of graft vs host disease (GVHD).  We recommend HLA typing of the patient, siblings and his children. We will initiate a donor search for an URD.  \par \par The patient's questions were answered to his apparent satisfaction. He indicated understanding of our discussion above and agreement with the proposed treatment plan. He knows to contact us if any new questions or concerns arise.\par \par I personally have spent a total of 60 minutes of time on the date of this encounter reviewing test results, documenting findings, coordinating care and directly consulting with the patient and/or designated family member.\par \par Plan:\par -will obtain HLA typing \par -will get sister and half brother swabs for HLA typing\par -will proceed with pre-testing\par -RTC in 6 weeks\par

## 2023-02-01 NOTE — HISTORY OF PRESENT ILLNESS
[de-identified] : 38 y/o M with no significant PMHx diagnosed with very severe aplastic anemia in April 2020. H confirmed on bone marrow biopsy at [facility] and now s/p inpatient treatment beginning 4/23/2020 with horse ATG + CsA + promacta with complete remission. Bone marrow bx on 11/9/20 revealed hypocellular marrow, overall cellularity improved (20-50%) showing marrow regeneration. BMBx on 5/18/2021 showing normal morphology with normal cellularity. Unfortunately patient relapsed in November 2021 with worsening cytopenias and neutropenic fever. He was admitted for treatment with rabbit ATG, prednisone, cyclosporine and Promacta with partial response  starting 12/27/21. 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 He reports doing well with ravulizumab and is able to continue working full time.\par He denies bleeding, bruising, fever, chills, night sweats, appetite changes.\par \par He presents today with his partner for consideration of a consolidative allogeneic stem cell transplant. He is originally form [facility]. He has 1 sister age 31 with 2 kids and a ½ brother age 22. \par \par \par \par PMHX:\par Asthma\par \par Allergy:\par NKDA\par \par SocialHx:\par Lives in  with partner and her family\par No children\par Manages oil terminal \par Denies smoking, ETOH use, illicit drug use\par \par \par FamilyHx:\par Father – unknown\par Mother – HTN\par Siblings – no medical history\par \par Dentist: needs to go\par

## 2023-02-02 ENCOUNTER — NON-APPOINTMENT (OUTPATIENT)
Age: 38
End: 2023-02-02

## 2023-02-02 ENCOUNTER — APPOINTMENT (OUTPATIENT)
Dept: HEMATOLOGY ONCOLOGY | Facility: CLINIC | Age: 38
End: 2023-02-02
Payer: COMMERCIAL

## 2023-02-02 ENCOUNTER — RESULT REVIEW (OUTPATIENT)
Age: 38
End: 2023-02-02

## 2023-02-02 VITALS
SYSTOLIC BLOOD PRESSURE: 124 MMHG | BODY MASS INDEX: 34.58 KG/M2 | WEIGHT: 228.16 LBS | DIASTOLIC BLOOD PRESSURE: 79 MMHG | TEMPERATURE: 98.4 F | OXYGEN SATURATION: 99 % | HEART RATE: 78 BPM | RESPIRATION RATE: 16 BRPM

## 2023-02-02 LAB
BASOPHILS # BLD AUTO: 0.01 K/UL — SIGNIFICANT CHANGE UP (ref 0–0.2)
BASOPHILS NFR BLD AUTO: 0.4 % — SIGNIFICANT CHANGE UP (ref 0–2)
EOSINOPHIL # BLD AUTO: 0.02 K/UL — SIGNIFICANT CHANGE UP (ref 0–0.5)
EOSINOPHIL NFR BLD AUTO: 0.8 % — SIGNIFICANT CHANGE UP (ref 0–6)
HCT VFR BLD CALC: 23.2 % — LOW (ref 39–50)
HGB BLD-MCNC: 7.7 G/DL — LOW (ref 13–17)
IMM GRANULOCYTES NFR BLD AUTO: 1.2 % — HIGH (ref 0–0.9)
LYMPHOCYTES # BLD AUTO: 1.05 K/UL — SIGNIFICANT CHANGE UP (ref 1–3.3)
LYMPHOCYTES # BLD AUTO: 41.2 % — SIGNIFICANT CHANGE UP (ref 13–44)
MCHC RBC-ENTMCNC: 33.2 G/DL — SIGNIFICANT CHANGE UP (ref 32–36)
MCHC RBC-ENTMCNC: 36.3 PG — HIGH (ref 27–34)
MCV RBC AUTO: 109.4 FL — HIGH (ref 80–100)
MONOCYTES # BLD AUTO: 0.26 K/UL — SIGNIFICANT CHANGE UP (ref 0–0.9)
MONOCYTES NFR BLD AUTO: 10.2 % — SIGNIFICANT CHANGE UP (ref 2–14)
NEUTROPHILS # BLD AUTO: 1.18 K/UL — LOW (ref 1.8–7.4)
NEUTROPHILS NFR BLD AUTO: 46.2 % — SIGNIFICANT CHANGE UP (ref 43–77)
NRBC # BLD: 0 /100 WBCS — SIGNIFICANT CHANGE UP (ref 0–0)
PLATELET # BLD AUTO: 135 K/UL — LOW (ref 150–400)
RBC # BLD: 2.12 M/UL — LOW (ref 4.2–5.8)
RBC # FLD: 22.1 % — HIGH (ref 10.3–14.5)
WBC # BLD: 2.55 K/UL — LOW (ref 3.8–10.5)
WBC # FLD AUTO: 2.55 K/UL — LOW (ref 3.8–10.5)

## 2023-02-02 PROCEDURE — 81382 HLA II TYPING 1 LOC HR: CPT

## 2023-02-02 PROCEDURE — 99215 OFFICE O/P EST HI 40 MIN: CPT

## 2023-02-02 PROCEDURE — 81378 HLA I & II TYPING HR: CPT

## 2023-02-02 PROCEDURE — 86900 BLOOD TYPING SEROLOGIC ABO: CPT

## 2023-02-02 PROCEDURE — 86850 RBC ANTIBODY SCREEN: CPT

## 2023-02-02 PROCEDURE — 86901 BLOOD TYPING SEROLOGIC RH(D): CPT

## 2023-02-02 PROCEDURE — 86923 COMPATIBILITY TEST ELECTRIC: CPT

## 2023-02-04 ENCOUNTER — APPOINTMENT (OUTPATIENT)
Dept: INFUSION THERAPY | Facility: HOSPITAL | Age: 38
End: 2023-02-04

## 2023-02-04 NOTE — HISTORY OF PRESENT ILLNESS
[Disease:__________________________] : Disease: [unfilled] [Therapy: ___] : Therapy: [unfilled] [de-identified] : 36 y/o M with no PMH presented to OSH originally for c/o dizziness and feeling unwell and was then transferred to [facility] for further hematologic evaluation as patient was found to be severely pancytopenic. Patient first started noticing scattered bruising throughout his body (mostly thighs and arms) about 6 weeks prior to presentation, and also had epistaxis. Pt had also noticed intermittent fevers and chills, responsive to tylenol, and noticed very dark stools for the same time period. Also with spontaneous gum bleeding and endorsed severe fatigue and SOB with exertion. Also noted 15 lbs weight loss, partially intentional over past 2-3 months prior to presentation.  Also c/o mild headache located in posterior aspect of his head, denies sore throat, cough, +nausea but no vomiting, no abdominal pain, no diarrhea, no BRBPR, no urinary symptoms, no LE swelling, +visual changes.     \par \par Patient was transferred to [facility] from [facility] on 4/18. Patient was started on IVF, Levaquin, and allopurinol. Patient transfused blood and platelets. Bone Marrow biopsy performed on 4/20 consistent with Aplastic Anemia. 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 Because of this, patient's platelet transfusion threshold was made 50k. Horse ATG test dose was applied 4/22 with no reaction. Treatment started 4/23 consisting of equine ATG at 40mg/kg/day x 4 days, Cyclosporine 10mg/kg/day divided doses BID, Eltrombopag 150mg daily. Patient had episode of hives with ATG infusion on Day 1 thus steroid regimen was changed from oral prednisone to IV Solumedrol Q8 for the duration of ATG treatment and transitioned back to oral prednisone 1mg/kg daily for days 5-10 then tapered. The patient had grade 3 transaminitis likely from Promacta and ATG and Promacta was held as of 5/1. While patient was in the hospital, his course was also complicated by epigastric pain consistent with dyspepsia, most likely due to cyclosporine. Was managed on pepcid BID and sucralfate q6 hours and mylanta as needed with good relief. \par \par On 5/12 the patient's transaminitis had improved(grade 2 total bili, grade 1 AST, grade 2 ALT) and Promacta 150 mg po QD was resumed. Pt's cyclosporine dose was adjusted  according to the level (goal 200-400) and he was on 400 mg po q12h as of 5/18. On 5/16, patient developed chest discomfort during platelet transfusion which improved with benadryl. EKG and CXR were wnl. Patient originally refused benadryl pre-medication prior to platelets that day due to side effects with taking Benadryl, and it was decided to premedicate pt with Claritin or Zyrtec pre blood transfusion instead. On 5/20, cyclosporine level was 460, therefore dose was lowered to 300 mg PO BID. Patient's vision had improved, so platelet transfusion threshold liberalized to 20k. \par \par Patient has since had a complete recovery of blood counts. He discontinued Promacta after 6 months of therapy. He has been monitored since then wit stable CBC and therapeutic cyclosporine levels. \par \par He has since had repeat bone marrow biopsy on 5/18/2021 with normal morphology and cellularity\par \par In September 2021 noted his platelets dropping, and ultimately slowly was becoming pancytopenia. He was restarted on cyclosporine at home, but ultimately was admitted in late December with neutropenic fever. Bone marrow biopsy was repeated on 12/2021 and showed decreased megakaryocytes and other findings consistent with relapse aplastic anemia. Patient was transferred to [address] [place] [address] on 12/23 to start treatment for relapse with rabbit ATG, cyclosporine, prednisone, and eltrombopag. On 12/27 rabbit ATG test dose was given with no adverse reaction. Cyclosporine was started evening of 12/26. Rabbit ATG started 12/29, patient with reaction upon increase in flow rate with fevers, rigors. Patient re-challenged 12/30, again had reaction after completion of Day 1 dose. ATG was subsequently held, but then restarted on 1/3 and completed remaining 5 day course on 1/6. Cyclosporine levels were checked biweekly, dose adjusted as needed based on level. Ultimately was discharged on 450 mg BID cyclosporine. Patient was seen by ENT for sinusitis and treated with Unasyn thru 1/19. 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 BMBx showed erythroid predominant trilineage hematopoiesis with maturation and mildly decreased megakaryocytes. Patient ultimately given meningococcal vaccine and subsequently began treatment with loading dose of Ravulizumab (Ultomiris) on 8/25/2022. \par  [de-identified] : - Markedly hypocellular bone marrow (mostly 5% and focal 10-\par 15%) with focal minimal erythropoiesis, minimal to absent\par myelopoiesis, absent megakaryocytes, increased iron stores. Aplastic bone marrow.  [de-identified] : Cytogenetics: \par \par Result: Male karyotype\par Karyotype: 46,XY                                 {12                                 } [de-identified] : very severe Aplastic Anemia [FreeTextEntry1] : Ravulizumab started 8/25/22 [de-identified] : Patient seen for follow up on 02/02/2023. CsA was increased to 250 mg BID on 1/30/23 given decreasing hemoglobin. No abdominal pain, no vomiting but he did have a little nausea today but not significant. Appetite is normal at this point. Moving his bowels normally, normal brown color. Urinating normally, only dark when he knows he's dehydrated other than that it is pretty clear to him. No new skin rashes. Since his last visit he has visited with the bone marrow transplant team and he plans to meet with his siblings this weekend to discuss getting tested for a match.

## 2023-02-04 NOTE — REVIEW OF SYSTEMS
[Fatigue] : fatigue [SOB on Exertion] : shortness of breath during exertion [Negative] : Heme/Lymph [Fever] : no fever [Chills] : no chills [Night Sweats] : no night sweats [Recent Change In Weight] : ~T no recent weight change [Vision Problems] : no vision problems [Dysphagia] : no dysphagia [Nosebleeds] : no nosebleeds [Odynophagia] : no odynophagia [Chest Pain] : no chest pain [Palpitations] : no palpitations [Lower Ext Edema] : no lower extremity edema [Shortness Of Breath] : no shortness of breath [Wheezing] : no wheezing [Cough] : no cough [Muscle Weakness] : no muscle weakness [Insomnia] : no insomnia [Anxiety] : no anxiety [Hot Flashes] : no hot flashes [FreeTextEntry5] : per HPI

## 2023-02-04 NOTE — ASSESSMENT
[FreeTextEntry1] : 36 y/o M presented April 2020 with aplastic anemia (very severe AA) confirmed on bone marrow biopsy at [facility] and now s/p inpatient treatment beginning 4/23/2020 with horse ATG + CsA + promacta with complete remission. Bone marrow bx on 11/9/20 revealed hypocellular marrow, overall cellularity improved (20-50%) showing marrow regeneration. BMBx on 5/18/2021 showing normal morphology with normal cellularity. Patient with worsening cytopenias in November 2021 and with neutropenic fever as well, sent inpatient, s/p treatment starting 12/27/21 with rabbit ATG, prednisone, cyclosporine and Promacta with partial response. 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 \par \par -Patient had remained pancytopenic requiring transfusion, but transfusion requirements have considerably decreased. Hemoglobin today is 7.7 and he is symptomatic with mild fatigue, will plan for transfusion of 2 units PRBC. \par -Continue with folic acid for hemolysis - improved but LDH and bilirubin still mildly elevated .\par -Platelets over 100k now and sustaining for now. Promacta 75 mg daily still on board. Discussed future possibility if desires to conceive with his significant other, would need to hold Promacta. He understands this. \par -Repeated abdominal US with doppler - ruled out portal vein thrombosis in July 2022 given PNH and abdominal pain. \par -Patient with history of ANSON in the past likely related to supratherapeutic cyclosporine levels. \par -cyclosporine level subtherapeutic on most recent check and on 1/30/23 was increased to 250 mg BID. Will recheck levels in 2 weeks. \par -Continue Ravulizumab. Dosing [id number] at this point. \par -Meningitis vaccine series started 8/9/22 and completed on 10/4. \par -s/p appointment with transplant team and will be discussing with his family this weekend. \par -Patient understands and agrees with plan. All information explained to the best of my ability.\par -RTC in 1 month

## 2023-02-04 NOTE — PHYSICAL EXAM
[Fully active, able to carry on all pre-disease performance without restriction] : Status 0 - Fully active, able to carry on all pre-disease performance without restriction [Obese] : obese [Normal] : affect appropriate [de-identified] : EOMI (+) mild scleral icterus [de-identified] : supple [de-identified] : (+) S1S2 RRR [de-identified] : no edema  [de-identified] : no tenderness [de-identified] : ROM intact [de-identified] : warm/dry [de-identified] : A&Ox3

## 2023-02-05 LAB
ALBUMIN SERPL ELPH-MCNC: 4.7 G/DL
ALBUMIN SERPL ELPH-MCNC: 5 G/DL
ALP BLD-CCNC: 90 U/L
ALP BLD-CCNC: 91 U/L
ALT SERPL-CCNC: 29 U/L
ALT SERPL-CCNC: 30 U/L
ANION GAP SERPL CALC-SCNC: 11 MMOL/L
ANION GAP SERPL CALC-SCNC: 12 MMOL/L
AST SERPL-CCNC: 18 U/L
AST SERPL-CCNC: 23 U/L
BILIRUB SERPL-MCNC: 3.4 MG/DL
BILIRUB SERPL-MCNC: 3.8 MG/DL
BUN SERPL-MCNC: 15 MG/DL
BUN SERPL-MCNC: 17 MG/DL
CALCIUM SERPL-MCNC: 9.4 MG/DL
CALCIUM SERPL-MCNC: 9.6 MG/DL
CHLORIDE SERPL-SCNC: 104 MMOL/L
CHLORIDE SERPL-SCNC: 106 MMOL/L
CO2 SERPL-SCNC: 23 MMOL/L
CO2 SERPL-SCNC: 23 MMOL/L
CREAT SERPL-MCNC: 0.89 MG/DL
CREAT SERPL-MCNC: 0.97 MG/DL
CYCLOSPORINE SER-MCNC: 125 NG/ML
CYCLOSPORINE SER-MCNC: 135 NG/ML
EGFR: 104 ML/MIN/1.73M2
EGFR: 115 ML/MIN/1.73M2
GLUCOSE SERPL-MCNC: 101 MG/DL
GLUCOSE SERPL-MCNC: 98 MG/DL
LDH SERPL-CCNC: 292 U/L
LDH SERPL-CCNC: 303 U/L
POTASSIUM SERPL-SCNC: 4.4 MMOL/L
POTASSIUM SERPL-SCNC: 4.5 MMOL/L
PROT SERPL-MCNC: 7.3 G/DL
PROT SERPL-MCNC: 7.5 G/DL
SODIUM SERPL-SCNC: 138 MMOL/L
SODIUM SERPL-SCNC: 140 MMOL/L

## 2023-02-07 DIAGNOSIS — Z51.89 ENCOUNTER FOR OTHER SPECIFIED AFTERCARE: ICD-10-CM

## 2023-02-07 DIAGNOSIS — R11.2 NAUSEA WITH VOMITING, UNSPECIFIED: ICD-10-CM

## 2023-02-16 ENCOUNTER — RESULT REVIEW (OUTPATIENT)
Age: 38
End: 2023-02-16

## 2023-02-16 ENCOUNTER — OUTPATIENT (OUTPATIENT)
Dept: OUTPATIENT SERVICES | Facility: HOSPITAL | Age: 38
LOS: 1 days | End: 2023-02-16
Payer: COMMERCIAL

## 2023-02-16 ENCOUNTER — APPOINTMENT (OUTPATIENT)
Dept: HEMATOLOGY ONCOLOGY | Facility: CLINIC | Age: 38
End: 2023-02-16

## 2023-02-16 DIAGNOSIS — Z98.890 OTHER SPECIFIED POSTPROCEDURAL STATES: Chronic | ICD-10-CM

## 2023-02-16 DIAGNOSIS — D59.5: ICD-10-CM

## 2023-02-16 LAB
BASOPHILS # BLD AUTO: 0.01 K/UL — SIGNIFICANT CHANGE UP (ref 0–0.2)
BASOPHILS NFR BLD AUTO: 0.3 % — SIGNIFICANT CHANGE UP (ref 0–2)
EOSINOPHIL # BLD AUTO: 0.06 K/UL — SIGNIFICANT CHANGE UP (ref 0–0.5)
EOSINOPHIL NFR BLD AUTO: 1.7 % — SIGNIFICANT CHANGE UP (ref 0–6)
HCT VFR BLD CALC: 29 % — LOW (ref 39–50)
HGB BLD-MCNC: 9.8 G/DL — LOW (ref 13–17)
IMM GRANULOCYTES NFR BLD AUTO: 1.4 % — HIGH (ref 0–0.9)
LYMPHOCYTES # BLD AUTO: 1.66 K/UL — SIGNIFICANT CHANGE UP (ref 1–3.3)
LYMPHOCYTES # BLD AUTO: 47.7 % — HIGH (ref 13–44)
MCHC RBC-ENTMCNC: 33.8 G/DL — SIGNIFICANT CHANGE UP (ref 32–36)
MCHC RBC-ENTMCNC: 33.9 PG — SIGNIFICANT CHANGE UP (ref 27–34)
MCV RBC AUTO: 100 FL — SIGNIFICANT CHANGE UP (ref 80–100)
MONOCYTES # BLD AUTO: 0.32 K/UL — SIGNIFICANT CHANGE UP (ref 0–0.9)
MONOCYTES NFR BLD AUTO: 9.2 % — SIGNIFICANT CHANGE UP (ref 2–14)
NEUTROPHILS # BLD AUTO: 1.38 K/UL — LOW (ref 1.8–7.4)
NEUTROPHILS NFR BLD AUTO: 39.7 % — LOW (ref 43–77)
NRBC # BLD: 0 /100 WBCS — SIGNIFICANT CHANGE UP (ref 0–0)
PLATELET # BLD AUTO: 145 K/UL — LOW (ref 150–400)
RBC # BLD: 2.89 M/UL — LOW (ref 4.2–5.8)
RBC # FLD: 21.4 % — HIGH (ref 10.3–14.5)
WBC # BLD: 3.48 K/UL — LOW (ref 3.8–10.5)
WBC # FLD AUTO: 3.48 K/UL — LOW (ref 3.8–10.5)

## 2023-02-17 ENCOUNTER — NON-APPOINTMENT (OUTPATIENT)
Age: 38
End: 2023-02-17

## 2023-02-20 LAB
ALBUMIN SERPL ELPH-MCNC: 4.9 G/DL
ALP BLD-CCNC: 108 U/L
ALT SERPL-CCNC: 32 U/L
ANION GAP SERPL CALC-SCNC: 11 MMOL/L
AST SERPL-CCNC: 20 U/L
BILIRUB SERPL-MCNC: 2.3 MG/DL
BUN SERPL-MCNC: 16 MG/DL
CALCIUM SERPL-MCNC: 9.5 MG/DL
CHLORIDE SERPL-SCNC: 104 MMOL/L
CO2 SERPL-SCNC: 22 MMOL/L
CREAT SERPL-MCNC: 0.89 MG/DL
CYCLOSPORINE SER-MCNC: 108 NG/ML
EGFR: 115 ML/MIN/1.73M2
GLUCOSE SERPL-MCNC: 97 MG/DL
LDH SERPL-CCNC: 263 U/L
POTASSIUM SERPL-SCNC: 4.2 MMOL/L
PROT SERPL-MCNC: 7.6 G/DL
SODIUM SERPL-SCNC: 138 MMOL/L

## 2023-02-23 ENCOUNTER — APPOINTMENT (OUTPATIENT)
Dept: INFUSION THERAPY | Facility: HOSPITAL | Age: 38
End: 2023-02-23

## 2023-02-23 DIAGNOSIS — Z51.11 ENCOUNTER FOR ANTINEOPLASTIC CHEMOTHERAPY: ICD-10-CM

## 2023-03-02 ENCOUNTER — RESULT REVIEW (OUTPATIENT)
Age: 38
End: 2023-03-02

## 2023-03-02 ENCOUNTER — APPOINTMENT (OUTPATIENT)
Dept: HEMATOLOGY ONCOLOGY | Facility: CLINIC | Age: 38
End: 2023-03-02

## 2023-03-10 ENCOUNTER — APPOINTMENT (OUTPATIENT)
Dept: HEMATOLOGY ONCOLOGY | Facility: CLINIC | Age: 38
End: 2023-03-10
Payer: COMMERCIAL

## 2023-03-10 ENCOUNTER — RESULT REVIEW (OUTPATIENT)
Age: 38
End: 2023-03-10

## 2023-03-10 VITALS
DIASTOLIC BLOOD PRESSURE: 85 MMHG | SYSTOLIC BLOOD PRESSURE: 133 MMHG | HEART RATE: 85 BPM | TEMPERATURE: 97.2 F | RESPIRATION RATE: 16 BRPM | OXYGEN SATURATION: 99 % | WEIGHT: 230.16 LBS | BODY MASS INDEX: 34.88 KG/M2

## 2023-03-10 LAB
BASOPHILS # BLD AUTO: 0.01 K/UL — SIGNIFICANT CHANGE UP (ref 0–0.2)
BASOPHILS NFR BLD AUTO: 0.3 % — SIGNIFICANT CHANGE UP (ref 0–2)
EOSINOPHIL # BLD AUTO: 0.03 K/UL — SIGNIFICANT CHANGE UP (ref 0–0.5)
EOSINOPHIL NFR BLD AUTO: 0.8 % — SIGNIFICANT CHANGE UP (ref 0–6)
HCT VFR BLD CALC: 25.9 % — LOW (ref 39–50)
HGB BLD-MCNC: 8.7 G/DL — LOW (ref 13–17)
IMM GRANULOCYTES NFR BLD AUTO: 1.9 % — HIGH (ref 0–0.9)
LYMPHOCYTES # BLD AUTO: 1.57 K/UL — SIGNIFICANT CHANGE UP (ref 1–3.3)
LYMPHOCYTES # BLD AUTO: 43.4 % — SIGNIFICANT CHANGE UP (ref 13–44)
MCHC RBC-ENTMCNC: 33.6 G/DL — SIGNIFICANT CHANGE UP (ref 32–36)
MCHC RBC-ENTMCNC: 34.7 PG — HIGH (ref 27–34)
MCV RBC AUTO: 103.2 FL — HIGH (ref 80–100)
MONOCYTES # BLD AUTO: 0.42 K/UL — SIGNIFICANT CHANGE UP (ref 0–0.9)
MONOCYTES NFR BLD AUTO: 11.6 % — SIGNIFICANT CHANGE UP (ref 2–14)
NEUTROPHILS # BLD AUTO: 1.52 K/UL — LOW (ref 1.8–7.4)
NEUTROPHILS NFR BLD AUTO: 42 % — LOW (ref 43–77)
NRBC # BLD: 0 /100 WBCS — SIGNIFICANT CHANGE UP (ref 0–0)
PLATELET # BLD AUTO: 167 K/UL — SIGNIFICANT CHANGE UP (ref 150–400)
RBC # BLD: 2.51 M/UL — LOW (ref 4.2–5.8)
RBC # FLD: 23.3 % — HIGH (ref 10.3–14.5)
WBC # BLD: 3.62 K/UL — LOW (ref 3.8–10.5)
WBC # FLD AUTO: 3.62 K/UL — LOW (ref 3.8–10.5)

## 2023-03-10 PROCEDURE — 86901 BLOOD TYPING SEROLOGIC RH(D): CPT

## 2023-03-10 PROCEDURE — 86923 COMPATIBILITY TEST ELECTRIC: CPT

## 2023-03-10 PROCEDURE — 99215 OFFICE O/P EST HI 40 MIN: CPT

## 2023-03-10 PROCEDURE — 81378 HLA I & II TYPING HR: CPT

## 2023-03-10 PROCEDURE — 86900 BLOOD TYPING SEROLOGIC ABO: CPT

## 2023-03-10 PROCEDURE — 81382 HLA II TYPING 1 LOC HR: CPT

## 2023-03-10 PROCEDURE — 81373 HLA I TYPING 1 LOCUS LR: CPT

## 2023-03-10 PROCEDURE — 86850 RBC ANTIBODY SCREEN: CPT

## 2023-03-10 NOTE — HISTORY OF PRESENT ILLNESS
[de-identified] : 38 y/o M with no significant PMHx diagnosed with very severe aplastic anemia in April 2020. H confirmed on bone marrow biopsy at [facility] and now s/p inpatient treatment beginning 4/23/2020 with horse ATG + CsA + promacta with complete remission. Bone marrow bx on 11/9/20 revealed hypocellular marrow, overall cellularity improved (20-50%) showing marrow regeneration. BMBx on 5/18/2021 showing normal morphology with normal cellularity. Unfortunately patient relapsed in November 2021 with worsening cytopenias and neutropenic fever. He was admitted for treatment with rabbit ATG, prednisone, cyclosporine and Promacta with partial response  starting 12/27/21. 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 He reports doing well with ravulizumab and is able to continue working full time.\par He denies bleeding, bruising, fever, chills, night sweats, appetite changes.\par \par He presents today with his partner for follow up. He reports feeling okay, he continues to work full time. His energy, activity, and appetite are good. He however does feel fatigued when his Hb is low.  We discussed the plans for transplant. We will likely used his haplo sister as a donor and are completing work up for the MUD as back up. He is completing his pre-testing in the coming weeks. He would like to proceed with transplant at the end of April to the beginning of May.\par \par \par PMHX:\par Asthma\par \par Allergy:\par NKDA\par \par SocialHx:\par Lives in  with partner and her family\par No children\par Manages oil terminal \par Denies smoking, ETOH use, illicit drug use\par \par \par FamilyHx:\par Father – unknown\par Mother – HTN\par Siblings – no medical history\par \par Dentist: needs to go\par

## 2023-03-10 NOTE — ASSESSMENT
[FreeTextEntry1] : 36 y/o M with no significant PMHx diagnosed with very severe aplastic anemia in April 2020. It was confirmed on bone marrow biopsy at [facility] and now s/p inpatient treatment beginning 4/23/2020 with horse ATG + CsA + promacta with complete remission. Bone marrow bx on 11/9/20 revealed hypocellular marrow, overall cellularity improved (20-50%) showing marrow regeneration. BMBx on 5/18/2021 showing normal morphology with normal cellularity. Unfortunately patient relapsed in November 2021 with worsening cytopenias and neutropenic fever. He was admitted for treatment with rabbit ATG, prednisone, cyclosporine and Promacta with partial response  starting 12/27/21. 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 He is currently mildly symptomatic when his Hb is low.\par \par Severe aplastic anemia\par -continue with Ravulizumab and promacta with Dr. Goldberg\par -will finish pre-testing in the next couple of week\par -will likely use sister as the donor, MUD as back up\par -will plan to proceed with an alloSCT at the end of April to early May\par \par -RTC in 6 weeks\par \par I personally have spent a total of 40 minutes of time on the date of this encounter reviewing test results, documenting findings, coordinating care and directly consulting with the patient and/or designated family member.\par \par

## 2023-03-13 ENCOUNTER — APPOINTMENT (OUTPATIENT)
Dept: INFUSION THERAPY | Facility: HOSPITAL | Age: 38
End: 2023-03-13

## 2023-03-16 ENCOUNTER — APPOINTMENT (OUTPATIENT)
Dept: NUCLEAR MEDICINE | Facility: HOSPITAL | Age: 38
End: 2023-03-16
Payer: COMMERCIAL

## 2023-03-16 ENCOUNTER — OUTPATIENT (OUTPATIENT)
Dept: OUTPATIENT SERVICES | Facility: HOSPITAL | Age: 38
LOS: 1 days | End: 2023-03-16
Payer: COMMERCIAL

## 2023-03-16 DIAGNOSIS — Z01.818 ENCOUNTER FOR OTHER PREPROCEDURAL EXAMINATION: ICD-10-CM

## 2023-03-16 PROCEDURE — 70220 X-RAY EXAM OF SINUSES: CPT | Mod: 26

## 2023-03-16 PROCEDURE — 78472 GATED HEART PLANAR SINGLE: CPT | Mod: 26

## 2023-03-16 PROCEDURE — 78472 GATED HEART PLANAR SINGLE: CPT

## 2023-03-16 PROCEDURE — 70220 X-RAY EXAM OF SINUSES: CPT

## 2023-03-16 PROCEDURE — A9560: CPT

## 2023-03-21 ENCOUNTER — RESULT REVIEW (OUTPATIENT)
Age: 38
End: 2023-03-21

## 2023-03-21 ENCOUNTER — LABORATORY RESULT (OUTPATIENT)
Age: 38
End: 2023-03-21

## 2023-03-21 ENCOUNTER — APPOINTMENT (OUTPATIENT)
Dept: HEMATOLOGY ONCOLOGY | Facility: CLINIC | Age: 38
End: 2023-03-21

## 2023-03-21 ENCOUNTER — APPOINTMENT (OUTPATIENT)
Dept: PULMONOLOGY | Facility: CLINIC | Age: 38
End: 2023-03-21
Payer: COMMERCIAL

## 2023-03-21 VITALS
SYSTOLIC BLOOD PRESSURE: 124 MMHG | HEIGHT: 68 IN | WEIGHT: 230 LBS | HEART RATE: 80 BPM | BODY MASS INDEX: 34.86 KG/M2 | DIASTOLIC BLOOD PRESSURE: 79 MMHG | OXYGEN SATURATION: 100 %

## 2023-03-21 LAB
BASOPHILS # BLD AUTO: 0.01 K/UL — SIGNIFICANT CHANGE UP (ref 0–0.2)
BASOPHILS NFR BLD AUTO: 0.3 % — SIGNIFICANT CHANGE UP (ref 0–2)
EOSINOPHIL # BLD AUTO: 0.03 K/UL — SIGNIFICANT CHANGE UP (ref 0–0.5)
EOSINOPHIL NFR BLD AUTO: 1 % — SIGNIFICANT CHANGE UP (ref 0–6)
HCT VFR BLD CALC: 27.7 % — LOW (ref 39–50)
HGB BLD-MCNC: 9.4 G/DL — LOW (ref 13–17)
IMM GRANULOCYTES NFR BLD AUTO: 0.6 % — SIGNIFICANT CHANGE UP (ref 0–0.9)
LYMPHOCYTES # BLD AUTO: 1.33 K/UL — SIGNIFICANT CHANGE UP (ref 1–3.3)
LYMPHOCYTES # BLD AUTO: 42.4 % — SIGNIFICANT CHANGE UP (ref 13–44)
MCHC RBC-ENTMCNC: 32.3 PG — SIGNIFICANT CHANGE UP (ref 27–34)
MCHC RBC-ENTMCNC: 33.9 G/DL — SIGNIFICANT CHANGE UP (ref 32–36)
MCV RBC AUTO: 95.1 FL — SIGNIFICANT CHANGE UP (ref 80–100)
MONOCYTES # BLD AUTO: 0.33 K/UL — SIGNIFICANT CHANGE UP (ref 0–0.9)
MONOCYTES NFR BLD AUTO: 10.5 % — SIGNIFICANT CHANGE UP (ref 2–14)
NEUTROPHILS # BLD AUTO: 1.42 K/UL — LOW (ref 1.8–7.4)
NEUTROPHILS NFR BLD AUTO: 45.2 % — SIGNIFICANT CHANGE UP (ref 43–77)
NRBC # BLD: 0 /100 WBCS — SIGNIFICANT CHANGE UP (ref 0–0)
PLATELET # BLD AUTO: 157 K/UL — SIGNIFICANT CHANGE UP (ref 150–400)
RBC # BLD: 2.91 M/UL — LOW (ref 4.2–5.8)
RBC # FLD: 22.5 % — HIGH (ref 10.3–14.5)
WBC # BLD: 3.14 K/UL — LOW (ref 3.8–10.5)
WBC # FLD AUTO: 3.14 K/UL — LOW (ref 3.8–10.5)

## 2023-03-21 PROCEDURE — 94726 PLETHYSMOGRAPHY LUNG VOLUMES: CPT

## 2023-03-21 PROCEDURE — 94729 DIFFUSING CAPACITY: CPT

## 2023-03-21 PROCEDURE — 94010 BREATHING CAPACITY TEST: CPT

## 2023-03-21 PROCEDURE — 82803 BLOOD GASES ANY COMBINATION: CPT

## 2023-03-21 PROCEDURE — 36600 WITHDRAWAL OF ARTERIAL BLOOD: CPT | Mod: 59

## 2023-03-26 ENCOUNTER — OUTPATIENT (OUTPATIENT)
Dept: OUTPATIENT SERVICES | Facility: HOSPITAL | Age: 38
LOS: 1 days | Discharge: HOME | End: 2023-03-26

## 2023-03-26 DIAGNOSIS — D61.9 APLASTIC ANEMIA, UNSPECIFIED: ICD-10-CM

## 2023-03-26 DIAGNOSIS — Z98.890 OTHER SPECIFIED POSTPROCEDURAL STATES: Chronic | ICD-10-CM

## 2023-03-29 ENCOUNTER — APPOINTMENT (OUTPATIENT)
Dept: HEMATOLOGY ONCOLOGY | Facility: CLINIC | Age: 38
End: 2023-03-29
Payer: COMMERCIAL

## 2023-03-29 ENCOUNTER — RESULT REVIEW (OUTPATIENT)
Age: 38
End: 2023-03-29

## 2023-03-29 VITALS
OXYGEN SATURATION: 99 % | RESPIRATION RATE: 16 BRPM | HEART RATE: 97 BPM | TEMPERATURE: 97.1 F | BODY MASS INDEX: 35.03 KG/M2 | WEIGHT: 230.38 LBS | SYSTOLIC BLOOD PRESSURE: 137 MMHG | DIASTOLIC BLOOD PRESSURE: 83 MMHG

## 2023-03-29 DIAGNOSIS — N17.9 ACUTE KIDNEY FAILURE, UNSPECIFIED: ICD-10-CM

## 2023-03-29 DIAGNOSIS — R17 UNSPECIFIED JAUNDICE: ICD-10-CM

## 2023-03-29 LAB
ALBUMIN SERPL ELPH-MCNC: 4.8 G/DL
ALP BLD-CCNC: 100 U/L
ALT SERPL-CCNC: 28 U/L
ANION GAP SERPL CALC-SCNC: 11 MMOL/L
AST SERPL-CCNC: 18 U/L
BASOPHILS # BLD AUTO: 0.02 K/UL — SIGNIFICANT CHANGE UP (ref 0–0.2)
BASOPHILS NFR BLD AUTO: 0.5 % — SIGNIFICANT CHANGE UP (ref 0–2)
BILIRUB SERPL-MCNC: 2.9 MG/DL
BUN SERPL-MCNC: 15 MG/DL
CALCIUM SERPL-MCNC: 9.6 MG/DL
CHLORIDE SERPL-SCNC: 104 MMOL/L
CO2 SERPL-SCNC: 23 MMOL/L
CREAT SERPL-MCNC: 0.89 MG/DL
CYCLOSPORINE SER-MCNC: 178 NG/ML
EGFR: 115 ML/MIN/1.73M2
EOSINOPHIL # BLD AUTO: 0.07 K/UL — SIGNIFICANT CHANGE UP (ref 0–0.5)
EOSINOPHIL NFR BLD AUTO: 1.6 % — SIGNIFICANT CHANGE UP (ref 0–6)
GLUCOSE SERPL-MCNC: 91 MG/DL
HCT VFR BLD CALC: 26.2 % — LOW (ref 39–50)
HGB BLD-MCNC: 9 G/DL — LOW (ref 13–17)
IMM GRANULOCYTES NFR BLD AUTO: 1.4 % — HIGH (ref 0–0.9)
LDH SERPL-CCNC: 274 U/L
LYMPHOCYTES # BLD AUTO: 1.78 K/UL — SIGNIFICANT CHANGE UP (ref 1–3.3)
LYMPHOCYTES # BLD AUTO: 40.9 % — SIGNIFICANT CHANGE UP (ref 13–44)
MCHC RBC-ENTMCNC: 32.8 PG — SIGNIFICANT CHANGE UP (ref 27–34)
MCHC RBC-ENTMCNC: 34.4 G/DL — SIGNIFICANT CHANGE UP (ref 32–36)
MCV RBC AUTO: 95.6 FL — SIGNIFICANT CHANGE UP (ref 80–100)
MONOCYTES # BLD AUTO: 0.4 K/UL — SIGNIFICANT CHANGE UP (ref 0–0.9)
MONOCYTES NFR BLD AUTO: 9.2 % — SIGNIFICANT CHANGE UP (ref 2–14)
NEUTROPHILS # BLD AUTO: 2.02 K/UL — SIGNIFICANT CHANGE UP (ref 1.8–7.4)
NEUTROPHILS NFR BLD AUTO: 46.4 % — SIGNIFICANT CHANGE UP (ref 43–77)
NRBC # BLD: 0 /100 WBCS — SIGNIFICANT CHANGE UP (ref 0–0)
PLATELET # BLD AUTO: 160 K/UL — SIGNIFICANT CHANGE UP (ref 150–400)
POTASSIUM SERPL-SCNC: 4.5 MMOL/L
PROT SERPL-MCNC: 7.2 G/DL
RBC # BLD: 2.74 M/UL — LOW (ref 4.2–5.8)
RBC # FLD: 23.2 % — HIGH (ref 10.3–14.5)
SODIUM SERPL-SCNC: 138 MMOL/L
WBC # BLD: 4.35 K/UL — SIGNIFICANT CHANGE UP (ref 3.8–10.5)
WBC # FLD AUTO: 4.35 K/UL — SIGNIFICANT CHANGE UP (ref 3.8–10.5)

## 2023-03-29 PROCEDURE — 99215 OFFICE O/P EST HI 40 MIN: CPT

## 2023-03-29 NOTE — ASSESSMENT
[FreeTextEntry1] : 38 y/o M with no significant PMHx diagnosed with very severe aplastic anemia in April 2020. It was confirmed on bone marrow biopsy at [facility] and now s/p inpatient treatment beginning 4/23/2020 with horse ATG + CsA + promacta with complete remission. Bone marrow bx on 11/9/20 revealed hypocellular marrow, overall cellularity improved (20-50%) showing marrow regeneration. BMBx on 5/18/2021 showing normal morphology with normal cellularity. Unfortunately patient relapsed in November 2021 with worsening cytopenias and neutropenic fever. He was admitted for treatment with rabbit ATG, prednisone, cyclosporine and Promacta with partial response  starting 12/27/21. 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 He is currently mildly symptomatic when his Hb is low.\par Discussed risks, benefits, alternatives, logistics and rationale for allogeneic stem cell transplant, quests addressed\par Severe aplastic anemia\par -continue with Ravulizumab and promacta with Dr. Goldberg\par -will finish pre-testing in the next couple of weeks\par -will likely use haplo sister as the donor, unrelated donor as back up...confirm VCM\par flu / cy / tbi prep with post transplant ctx, mmf and tacro....will confirm prep for haplo MADAY\par -initially planned to proceed with an alloSCT at the end of April to early May..pt requests admission in june...will admit 6/2 with transplant 6/8, given dx of MADAY discussed bm harvesting...sister is approx 140 pounds...collection would be 6/7..need to evaluate and clear donor...will confirm cmv status and blood types as well\par \par -RTC last week of april\par pt to see his dentist, TBI eval needed as well as orientation\par repeat bm bx third week of may\par quests addressed\par \par I personally have spent a total of 50 minutes of time on the date of this encounter reviewing test results, documenting findings, coordinating care and directly consulting with the patient and/or designated family member.\par \par

## 2023-03-29 NOTE — HISTORY OF PRESENT ILLNESS
[de-identified] : 38 y/o M with no significant PMHx diagnosed with very severe aplastic anemia in April 2020. H confirmed on bone marrow biopsy at [facility] and now s/p inpatient treatment beginning 4/23/2020 with horse ATG + CsA + promacta with complete remission. Bone marrow bx on 11/9/20 revealed hypocellular marrow, overall cellularity improved (20-50%) showing marrow regeneration. BMBx on 5/18/2021 showing normal morphology with normal cellularity. Unfortunately patient relapsed in November 2021 with worsening cytopenias and neutropenic fever. He was admitted for treatment with rabbit ATG, prednisone, cyclosporine and Promacta with partial response  starting 12/27/21. 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 He reports doing well with ravulizumab and is able to continue working full time.\par He denies bleeding, bruising, fever, chills, night sweats, appetite changes.\par \par He presents today with his partner for follow up. He reports feeling okay, he continues to work full time. His energy, activity, and appetite are good. He however does feel fatigued when his Hb is low.  We discussed the plans for transplant. We will likely used his haplo sister as a donor and are completing work up for the [facility] as back up. He is completing his pre-testing in the coming weeks. He would like to proceed with transplant at the end of April to the beginning of May.\par \par \par PMHX:\par Asthma\par \par Allergy:\par NKDA\par \par SocialHx:\par Lives in  with partner and her family\par No children\par Manages oil terminal \par Denies smoking, ETOH use, illicit drug use\par \par \par FamilyHx:\par Father – unknown\par Mother – HTN\par Siblings – no medical history\par \par Dentist: needs to go\par  [de-identified] : he for f/u and further discussion regarding allogeneic stem cell transplant...he is feeling well, he is here with his sig other

## 2023-04-05 ENCOUNTER — NON-APPOINTMENT (OUTPATIENT)
Age: 38
End: 2023-04-05

## 2023-04-19 ENCOUNTER — OUTPATIENT (OUTPATIENT)
Dept: OUTPATIENT SERVICES | Facility: HOSPITAL | Age: 38
LOS: 1 days | End: 2023-04-19
Payer: COMMERCIAL

## 2023-04-19 ENCOUNTER — RESULT REVIEW (OUTPATIENT)
Age: 38
End: 2023-04-19

## 2023-04-19 ENCOUNTER — APPOINTMENT (OUTPATIENT)
Dept: HEMATOLOGY ONCOLOGY | Facility: CLINIC | Age: 38
End: 2023-04-19

## 2023-04-19 DIAGNOSIS — Z98.890 OTHER SPECIFIED POSTPROCEDURAL STATES: Chronic | ICD-10-CM

## 2023-04-19 DIAGNOSIS — D59.5: ICD-10-CM

## 2023-04-19 LAB
BASOPHILS # BLD AUTO: 0.01 K/UL — SIGNIFICANT CHANGE UP (ref 0–0.2)
BASOPHILS NFR BLD AUTO: 0.3 % — SIGNIFICANT CHANGE UP (ref 0–2)
EOSINOPHIL # BLD AUTO: 0.03 K/UL — SIGNIFICANT CHANGE UP (ref 0–0.5)
EOSINOPHIL NFR BLD AUTO: 0.8 % — SIGNIFICANT CHANGE UP (ref 0–6)
HCT VFR BLD CALC: 26.1 % — LOW (ref 39–50)
HGB BLD-MCNC: 8.7 G/DL — LOW (ref 13–17)
IMM GRANULOCYTES NFR BLD AUTO: 1.3 % — HIGH (ref 0–0.9)
LYMPHOCYTES # BLD AUTO: 1.47 K/UL — SIGNIFICANT CHANGE UP (ref 1–3.3)
LYMPHOCYTES # BLD AUTO: 39.6 % — SIGNIFICANT CHANGE UP (ref 13–44)
MCHC RBC-ENTMCNC: 33.3 G/DL — SIGNIFICANT CHANGE UP (ref 32–36)
MCHC RBC-ENTMCNC: 34.3 PG — HIGH (ref 27–34)
MCV RBC AUTO: 102.8 FL — HIGH (ref 80–100)
MONOCYTES # BLD AUTO: 0.41 K/UL — SIGNIFICANT CHANGE UP (ref 0–0.9)
MONOCYTES NFR BLD AUTO: 11.1 % — SIGNIFICANT CHANGE UP (ref 2–14)
NEUTROPHILS # BLD AUTO: 1.74 K/UL — LOW (ref 1.8–7.4)
NEUTROPHILS NFR BLD AUTO: 46.9 % — SIGNIFICANT CHANGE UP (ref 43–77)
NRBC # BLD: 0 /100 WBCS — SIGNIFICANT CHANGE UP (ref 0–0)
PLATELET # BLD AUTO: 175 K/UL — SIGNIFICANT CHANGE UP (ref 150–400)
RBC # BLD: 2.54 M/UL — LOW (ref 4.2–5.8)
RBC # FLD: 26.6 % — HIGH (ref 10.3–14.5)
WBC # BLD: 3.71 K/UL — LOW (ref 3.8–10.5)
WBC # FLD AUTO: 3.71 K/UL — LOW (ref 3.8–10.5)

## 2023-04-20 ENCOUNTER — APPOINTMENT (OUTPATIENT)
Dept: INFUSION THERAPY | Facility: HOSPITAL | Age: 38
End: 2023-04-20

## 2023-04-20 DIAGNOSIS — Z51.11 ENCOUNTER FOR ANTINEOPLASTIC CHEMOTHERAPY: ICD-10-CM

## 2023-04-20 DIAGNOSIS — R11.2 NAUSEA WITH VOMITING, UNSPECIFIED: ICD-10-CM

## 2023-04-21 DIAGNOSIS — Z51.89 ENCOUNTER FOR OTHER SPECIFIED AFTERCARE: ICD-10-CM

## 2023-04-23 LAB
ALBUMIN SERPL ELPH-MCNC: 4.9 G/DL
ALP BLD-CCNC: 94 U/L
ALT SERPL-CCNC: 41 U/L
ANION GAP SERPL CALC-SCNC: 11 MMOL/L
AST SERPL-CCNC: 24 U/L
BILIRUB SERPL-MCNC: 2.4 MG/DL
BUN SERPL-MCNC: 12 MG/DL
CALCIUM SERPL-MCNC: 9.4 MG/DL
CHLORIDE SERPL-SCNC: 106 MMOL/L
CO2 SERPL-SCNC: 22 MMOL/L
CREAT SERPL-MCNC: 0.94 MG/DL
EGFR: 108 ML/MIN/1.73M2
GLUCOSE SERPL-MCNC: 94 MG/DL
LDH SERPL-CCNC: 306 U/L
POTASSIUM SERPL-SCNC: 4.5 MMOL/L
PROT SERPL-MCNC: 6.9 G/DL
SODIUM SERPL-SCNC: 139 MMOL/L

## 2023-04-26 ENCOUNTER — LABORATORY RESULT (OUTPATIENT)
Age: 38
End: 2023-04-26

## 2023-04-26 ENCOUNTER — RESULT REVIEW (OUTPATIENT)
Age: 38
End: 2023-04-26

## 2023-04-26 ENCOUNTER — NON-APPOINTMENT (OUTPATIENT)
Age: 38
End: 2023-04-26

## 2023-04-26 ENCOUNTER — APPOINTMENT (OUTPATIENT)
Dept: HEMATOLOGY ONCOLOGY | Facility: CLINIC | Age: 38
End: 2023-04-26
Payer: COMMERCIAL

## 2023-04-26 VITALS
OXYGEN SATURATION: 98 % | WEIGHT: 227.08 LBS | RESPIRATION RATE: 16 BRPM | SYSTOLIC BLOOD PRESSURE: 144 MMHG | HEIGHT: 67.99 IN | BODY MASS INDEX: 34.41 KG/M2 | HEART RATE: 103 BPM | DIASTOLIC BLOOD PRESSURE: 79 MMHG | TEMPERATURE: 98 F

## 2023-04-26 LAB
ANISOCYTOSIS BLD QL: SLIGHT — SIGNIFICANT CHANGE UP
BASOPHILS # BLD AUTO: 0.05 K/UL — SIGNIFICANT CHANGE UP (ref 0–0.2)
BASOPHILS NFR BLD AUTO: 2 % — SIGNIFICANT CHANGE UP (ref 0–2)
DACRYOCYTES BLD QL SMEAR: SLIGHT — SIGNIFICANT CHANGE UP
ELLIPTOCYTES BLD QL SMEAR: SLIGHT — SIGNIFICANT CHANGE UP
EOSINOPHIL # BLD AUTO: 0.03 K/UL — SIGNIFICANT CHANGE UP (ref 0–0.5)
EOSINOPHIL NFR BLD AUTO: 1 % — SIGNIFICANT CHANGE UP (ref 0–6)
HCT VFR BLD CALC: 25.8 % — LOW (ref 39–50)
HGB BLD-MCNC: 8.6 G/DL — LOW (ref 13–17)
LYMPHOCYTES # BLD AUTO: 1.25 K/UL — SIGNIFICANT CHANGE UP (ref 1–3.3)
LYMPHOCYTES # BLD AUTO: 46 % — HIGH (ref 13–44)
MACROCYTES BLD QL: SLIGHT — SIGNIFICANT CHANGE UP
MCHC RBC-ENTMCNC: 33.2 PG — SIGNIFICANT CHANGE UP (ref 27–34)
MCHC RBC-ENTMCNC: 33.3 G/DL — SIGNIFICANT CHANGE UP (ref 32–36)
MCV RBC AUTO: 99.6 FL — SIGNIFICANT CHANGE UP (ref 80–100)
MONOCYTES # BLD AUTO: 0.33 K/UL — SIGNIFICANT CHANGE UP (ref 0–0.9)
MONOCYTES NFR BLD AUTO: 12 % — SIGNIFICANT CHANGE UP (ref 2–14)
NEUTROPHILS # BLD AUTO: 1.06 K/UL — LOW (ref 1.8–7.4)
NEUTROPHILS NFR BLD AUTO: 39 % — LOW (ref 43–77)
NRBC # BLD: 0 /100 — SIGNIFICANT CHANGE UP (ref 0–0)
NRBC # BLD: SIGNIFICANT CHANGE UP /100 WBCS (ref 0–0)
PLAT MORPH BLD: NORMAL — SIGNIFICANT CHANGE UP
PLATELET # BLD AUTO: 154 K/UL — SIGNIFICANT CHANGE UP (ref 150–400)
POIKILOCYTOSIS BLD QL AUTO: SLIGHT — SIGNIFICANT CHANGE UP
POLYCHROMASIA BLD QL SMEAR: SLIGHT — SIGNIFICANT CHANGE UP
RBC # BLD: 2.59 M/UL — LOW (ref 4.2–5.8)
RBC # FLD: 23.7 % — HIGH (ref 10.3–14.5)
RBC BLD AUTO: ABNORMAL
WBC # BLD: 2.71 K/UL — LOW (ref 3.8–10.5)
WBC # FLD AUTO: 2.71 K/UL — LOW (ref 3.8–10.5)

## 2023-04-26 PROCEDURE — 99215 OFFICE O/P EST HI 40 MIN: CPT

## 2023-05-01 NOTE — HISTORY OF PRESENT ILLNESS
[de-identified] : 38 y/o M with no significant PMHx diagnosed with very severe aplastic anemia in April 2020. H confirmed on bone marrow biopsy at [facility] and now s/p inpatient treatment beginning 4/23/2020 with horse ATG + CsA + promacta with complete remission. Bone marrow bx on 11/9/20 revealed hypocellular marrow, overall cellularity improved (20-50%) showing marrow regeneration. BMBx on 5/18/2021 showing normal morphology with normal cellularity. Unfortunately patient relapsed in November 2021 with worsening cytopenias and neutropenic fever. He was admitted for treatment with rabbit ATG, prednisone, cyclosporine and Promacta with partial response  starting 12/27/21. 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 He reports doing well with ravulizumab and is able to continue working full time.\par He denies bleeding, bruising, fever, chills, night sweats, appetite changes.\par \par He presents today with his partner for follow up. He reports feeling okay, he continues to work full time. His energy, activity, and appetite are good. He however does feel fatigued when his Hb is low.  We discussed the plans for transplant. We will likely used his haplo sister as a donor and are completing work up for the [facility] as back up. He is completing his pre-testing in the coming weeks. He would like to proceed with transplant at the end of April to the beginning of May.\par \par \par PMHX:\par Asthma\par \par Allergy:\par NKDA\par \par SocialHx:\par Lives in  with partner and her family\par No children\par Manages oil terminal \par Denies smoking, ETOH use, illicit drug use\par \par \par FamilyHx:\par Father – unknown\par Mother – HTN\par Siblings – no medical history\par \par Dentist: needs to go\par  [de-identified] : he for f/u and further discussion regarding allogeneic stem cell transplant...he is feeling well,

## 2023-05-01 NOTE — ASSESSMENT
[FreeTextEntry1] : 36 y/o M with no significant PMHx diagnosed with very severe aplastic anemia in April 2020. It was confirmed on bone marrow biopsy at [facility] and now s/p inpatient treatment beginning 4/23/2020 with horse ATG + CsA + promacta with complete remission. Bone marrow bx on 11/9/20 revealed hypocellular marrow, overall cellularity improved (20-50%) showing marrow regeneration. BMBx on 5/18/2021 showing normal morphology with normal cellularity. Unfortunately patient relapsed in November 2021 with worsening cytopenias and neutropenic fever. He was admitted for treatment with rabbit ATG, prednisone, cyclosporine and Promacta with partial response  starting 12/27/21. 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 He is currently mildly symptomatic when his Hb is low.\par Discussed risks, benefits, alternatives, logistics and rationale for allogeneic stem cell transplant, quests addressed\par Severe aplastic anemia\par -continue with Ravulizumab and promacta with Dr. Goldberg\par -will finish pre-testing in the next couple of weeks\par -will likely use haplo sister as the donor, unrelated donor as back up...confirm VCM\par flu / cy / tbi prep with post transplant ctx, mmf and tacro....will confirm prep for haplo MADAY\par -initially planned to proceed with an alloSCT at the end of April to early May..pt requests admission in june...will admit 6/2 with transplant 6/8, given dx of MADAY discussed bm harvesting...sister is approx 140 pounds...collection would be 6/7..need to evaluate and clear donor...will confirm cmv status and blood types as well\par \par -RTC 4 weeks\par pt to see his dentist, TBI eval needed as well as orientation\par repeat bm bx third week of may\par quests addressed\par \par I personally have spent a total of 50 minutes of time on the date of this encounter reviewing test results, documenting findings, coordinating care and directly consulting with the patient and/or designated family member.\par \par

## 2023-05-02 ENCOUNTER — NON-APPOINTMENT (OUTPATIENT)
Age: 38
End: 2023-05-02

## 2023-05-12 ENCOUNTER — NON-APPOINTMENT (OUTPATIENT)
Age: 38
End: 2023-05-12

## 2023-05-15 ENCOUNTER — APPOINTMENT (OUTPATIENT)
Dept: RADIATION ONCOLOGY | Facility: CLINIC | Age: 38
End: 2023-05-15
Payer: COMMERCIAL

## 2023-05-15 PROCEDURE — 99204 OFFICE O/P NEW MOD 45 MIN: CPT | Mod: 25,95

## 2023-05-15 RX ORDER — ELTROMBOPAG OLAMINE 75 MG/1
75 TABLET, FILM COATED ORAL
Qty: 30 | Refills: 5 | Status: DISCONTINUED | COMMUNITY
Start: 2021-12-08 | End: 2023-05-15

## 2023-05-15 NOTE — REASON FOR VISIT
[Consideration of Curative Therapy] : consideration of curative therapy for [Other: ___] : [unfilled] [Home] : at home, [unfilled] , at the time of the visit. [Medical Office: ([facility])___] : at the medical office located in  [Patient] : the patient [Self] : self

## 2023-05-15 NOTE — VITALS
[Maximal Pain Intensity: 0/10] : 0/10 [Least Pain Intensity: 0/10] : 0/10 [NoTreatment Scheduled] : no treatment scheduled [90: Able to carry normal activity; minor signs or symptoms of disease.] : 90: Able to carry normal activity; minor signs or symptoms of disease.  [ECOG Performance Status: 1 - Restricted in physically strenuous activity but ambulatory and able to carry out work of a light or sedentary nature] : Performance Status: 1 - Restricted in physically strenuous activity but ambulatory and able to carry out work of a light or sedentary nature, e.g., light house work, office work [Date: ____________] : Patient's last distress assessment performed on [unfilled]. [0 - No Distress] : Distress Level: 0 [FreeTextEntry7] : Telehealth patient .. follows closely with transplant social work

## 2023-05-15 NOTE — HISTORY OF PRESENT ILLNESS
[FreeTextEntry1] : Mr. Puckett is a 37 year old male who presents in consultation via Telehealth for consideration of radiation therapy in the setting of SCT.\par \par Diagnosis:  Aplastic Anemia, seen to discuss total body irradiation as part of stem cell transplant\par \par HPI : \par Patient was diagnosed with severe Aplastic Anemia in April 2020 after presenting to hospital with complaints of dizziness, feeling unwell, and with scattered bruising over his body as well as fever/chills.  He was started on systemic therapy and followed with Dr. Goldberg (hematology).   In May 2021 bone marrow biopsy showed normal morphology with normal cellularity.  Unfortunately in November 2021 he relapsed with worsening cytopenias and neutropenic fevers.  He was admitted to the hospital and was started on treatment for his relapsed disease.  He remained on treatment and continued to follow closely with Dr. Goldberg (hematology).  In August 2022 he was started on Ravulizumab due to rising PNH clone and worsening hemolysis with pancytopenia.  In January 2023 he was seen for consideration of consolidative allogeneic stem cell transplant.   He remains on treatment with Ravulizumab and plan is for allogeneic stem cell transplant using his sister as donor in June 2023.  He has been referred for radiation consult in preparation for transplant. \par \par \par 5/15/23 - presents via Telehealth in consultation to discuss radiation therapy.  Mr. Puckett is feeing well, denies any pain.  He looks forward to next steps in his treatment.

## 2023-05-17 ENCOUNTER — APPOINTMENT (OUTPATIENT)
Dept: HEMATOLOGY ONCOLOGY | Facility: CLINIC | Age: 38
End: 2023-05-17
Payer: COMMERCIAL

## 2023-05-17 ENCOUNTER — LABORATORY RESULT (OUTPATIENT)
Age: 38
End: 2023-05-17

## 2023-05-17 ENCOUNTER — NON-APPOINTMENT (OUTPATIENT)
Age: 38
End: 2023-05-17

## 2023-05-17 ENCOUNTER — OUTPATIENT (OUTPATIENT)
Dept: OUTPATIENT SERVICES | Facility: HOSPITAL | Age: 38
LOS: 1 days | Discharge: HOME | End: 2023-05-17

## 2023-05-17 ENCOUNTER — RESULT REVIEW (OUTPATIENT)
Age: 38
End: 2023-05-17

## 2023-05-17 VITALS
TEMPERATURE: 97.2 F | BODY MASS INDEX: 34.75 KG/M2 | HEART RATE: 78 BPM | HEIGHT: 67.99 IN | WEIGHT: 229.28 LBS | RESPIRATION RATE: 16 BRPM | OXYGEN SATURATION: 98 % | SYSTOLIC BLOOD PRESSURE: 136 MMHG | DIASTOLIC BLOOD PRESSURE: 83 MMHG

## 2023-05-17 DIAGNOSIS — D61.9 APLASTIC ANEMIA, UNSPECIFIED: ICD-10-CM

## 2023-05-17 DIAGNOSIS — Z98.890 OTHER SPECIFIED POSTPROCEDURAL STATES: Chronic | ICD-10-CM

## 2023-05-17 LAB
BASOPHILS # BLD AUTO: 0.02 K/UL — SIGNIFICANT CHANGE UP (ref 0–0.2)
BASOPHILS NFR BLD AUTO: 0.6 % — SIGNIFICANT CHANGE UP (ref 0–2)
EOSINOPHIL # BLD AUTO: 0.06 K/UL — SIGNIFICANT CHANGE UP (ref 0–0.5)
EOSINOPHIL NFR BLD AUTO: 1.7 % — SIGNIFICANT CHANGE UP (ref 0–6)
HCT VFR BLD CALC: 22.9 % — LOW (ref 39–50)
HGB BLD-MCNC: 7.7 G/DL — LOW (ref 13–17)
IMM GRANULOCYTES NFR BLD AUTO: 1.1 % — HIGH (ref 0–0.9)
LYMPHOCYTES # BLD AUTO: 1.65 K/UL — SIGNIFICANT CHANGE UP (ref 1–3.3)
LYMPHOCYTES # BLD AUTO: 45.6 % — HIGH (ref 13–44)
MCHC RBC-ENTMCNC: 33.6 G/DL — SIGNIFICANT CHANGE UP (ref 32–36)
MCHC RBC-ENTMCNC: 35.8 PG — HIGH (ref 27–34)
MCV RBC AUTO: 106.5 FL — HIGH (ref 80–100)
MONOCYTES # BLD AUTO: 0.29 K/UL — SIGNIFICANT CHANGE UP (ref 0–0.9)
MONOCYTES NFR BLD AUTO: 8 % — SIGNIFICANT CHANGE UP (ref 2–14)
NEUTROPHILS # BLD AUTO: 1.56 K/UL — LOW (ref 1.8–7.4)
NEUTROPHILS NFR BLD AUTO: 43 % — SIGNIFICANT CHANGE UP (ref 43–77)
NRBC # BLD: 0 /100 WBCS — SIGNIFICANT CHANGE UP (ref 0–0)
PLATELET # BLD AUTO: 143 K/UL — LOW (ref 150–400)
RBC # BLD: 2.15 M/UL — LOW (ref 4.2–5.8)
RBC # FLD: 26 % — HIGH (ref 10.3–14.5)
RETICS #: 293.5 K/UL — HIGH (ref 25–125)
RETICS/RBC NFR: 12.9 % — HIGH (ref 0.5–2.5)
WBC # BLD: 3.62 K/UL — LOW (ref 3.8–10.5)
WBC # FLD AUTO: 3.62 K/UL — LOW (ref 3.8–10.5)

## 2023-05-17 PROCEDURE — 86900 BLOOD TYPING SEROLOGIC ABO: CPT

## 2023-05-17 PROCEDURE — 86923 COMPATIBILITY TEST ELECTRIC: CPT

## 2023-05-17 PROCEDURE — 86901 BLOOD TYPING SEROLOGIC RH(D): CPT

## 2023-05-17 PROCEDURE — 81376 HLA II TYPING 1 LOCUS LR: CPT

## 2023-05-17 PROCEDURE — 38222 DX BONE MARROW BX & ASPIR: CPT | Mod: LT

## 2023-05-17 PROCEDURE — 81373 HLA I TYPING 1 LOCUS LR: CPT

## 2023-05-17 PROCEDURE — 86850 RBC ANTIBODY SCREEN: CPT

## 2023-05-19 ENCOUNTER — OUTPATIENT (OUTPATIENT)
Dept: OUTPATIENT SERVICES | Facility: HOSPITAL | Age: 38
LOS: 1 days | End: 2023-05-19

## 2023-05-19 DIAGNOSIS — Z98.890 OTHER SPECIFIED POSTPROCEDURAL STATES: Chronic | ICD-10-CM

## 2023-05-19 DIAGNOSIS — D59.5: ICD-10-CM

## 2023-05-19 LAB
ALBUMIN SERPL ELPH-MCNC: 4.7 G/DL
ALP BLD-CCNC: 98 U/L
ALT SERPL-CCNC: 38 U/L
ANION GAP SERPL CALC-SCNC: 11 MMOL/L
AST SERPL-CCNC: 22 U/L
BILIRUB DIRECT SERPL-MCNC: 0.5 MG/DL
BILIRUB SERPL-MCNC: 3.5 MG/DL
BUN SERPL-MCNC: 16 MG/DL
CALCIUM SERPL-MCNC: 9.3 MG/DL
CHLORIDE SERPL-SCNC: 108 MMOL/L
CO2 SERPL-SCNC: 24 MMOL/L
CREAT SERPL-MCNC: 0.87 MG/DL
EGFR: 115 ML/MIN/1.73M2
GLUCOSE SERPL-MCNC: 125 MG/DL
HAPTOGLOB SERPL-MCNC: 43 MG/DL
LDH SERPL-CCNC: 271 U/L
POTASSIUM SERPL-SCNC: 4.4 MMOL/L
PROT SERPL-MCNC: 7.1 G/DL
SODIUM SERPL-SCNC: 143 MMOL/L

## 2023-05-20 ENCOUNTER — APPOINTMENT (OUTPATIENT)
Dept: INFUSION THERAPY | Facility: HOSPITAL | Age: 38
End: 2023-05-20

## 2023-05-21 DIAGNOSIS — Z51.89 ENCOUNTER FOR OTHER SPECIFIED AFTERCARE: ICD-10-CM

## 2023-05-21 DIAGNOSIS — R11.2 NAUSEA WITH VOMITING, UNSPECIFIED: ICD-10-CM

## 2023-05-22 ENCOUNTER — OUTPATIENT (OUTPATIENT)
Dept: OUTPATIENT SERVICES | Facility: HOSPITAL | Age: 38
LOS: 1 days | Discharge: HOME | End: 2023-05-22
Payer: COMMERCIAL

## 2023-05-22 DIAGNOSIS — Z98.890 OTHER SPECIFIED POSTPROCEDURAL STATES: Chronic | ICD-10-CM

## 2023-05-22 PROCEDURE — 77262 THER RADIOLOGY TX PLNG INTRM: CPT

## 2023-05-24 ENCOUNTER — NON-APPOINTMENT (OUTPATIENT)
Age: 38
End: 2023-05-24

## 2023-05-25 PROCEDURE — 77470 SPECIAL RADIATION TREATMENT: CPT | Mod: 26

## 2023-05-26 ENCOUNTER — RESULT REVIEW (OUTPATIENT)
Age: 38
End: 2023-05-26

## 2023-05-26 ENCOUNTER — LABORATORY RESULT (OUTPATIENT)
Age: 38
End: 2023-05-26

## 2023-05-26 ENCOUNTER — APPOINTMENT (OUTPATIENT)
Dept: HEMATOLOGY ONCOLOGY | Facility: CLINIC | Age: 38
End: 2023-05-26

## 2023-05-26 LAB
BASOPHILS # BLD AUTO: 0.03 K/UL — SIGNIFICANT CHANGE UP (ref 0–0.2)
BASOPHILS NFR BLD AUTO: 1 % — SIGNIFICANT CHANGE UP (ref 0–2)
EOSINOPHIL # BLD AUTO: 0.05 K/UL — SIGNIFICANT CHANGE UP (ref 0–0.5)
EOSINOPHIL NFR BLD AUTO: 2 % — SIGNIFICANT CHANGE UP (ref 0–6)
HCT VFR BLD CALC: 28.9 % — LOW (ref 39–50)
HGB BLD-MCNC: 9.8 G/DL — LOW (ref 13–17)
LYMPHOCYTES # BLD AUTO: 1.36 K/UL — SIGNIFICANT CHANGE UP (ref 1–3.3)
LYMPHOCYTES # BLD AUTO: 51 % — HIGH (ref 13–44)
MCHC RBC-ENTMCNC: 33.9 G/DL — SIGNIFICANT CHANGE UP (ref 32–36)
MCHC RBC-ENTMCNC: 34.4 PG — HIGH (ref 27–34)
MCV RBC AUTO: 101.4 FL — HIGH (ref 80–100)
MONOCYTES # BLD AUTO: 0.4 K/UL — SIGNIFICANT CHANGE UP (ref 0–0.9)
MONOCYTES NFR BLD AUTO: 15 % — HIGH (ref 2–14)
NEUTROPHILS # BLD AUTO: 0.82 K/UL — LOW (ref 1.8–7.4)
NEUTROPHILS NFR BLD AUTO: 31 % — LOW (ref 43–77)
NRBC # BLD: 0 /100 — SIGNIFICANT CHANGE UP (ref 0–0)
NRBC # BLD: SIGNIFICANT CHANGE UP /100 WBCS (ref 0–0)
PLAT MORPH BLD: NORMAL — SIGNIFICANT CHANGE UP
PLATELET # BLD AUTO: 141 K/UL — LOW (ref 150–400)
RBC # BLD: 2.85 M/UL — LOW (ref 4.2–5.8)
RBC # FLD: 20.5 % — HIGH (ref 10.3–14.5)
RBC BLD AUTO: SIGNIFICANT CHANGE UP
WBC # BLD: 2.66 K/UL — LOW (ref 3.8–10.5)
WBC # FLD AUTO: 2.66 K/UL — LOW (ref 3.8–10.5)

## 2023-05-26 NOTE — PROCEDURE
[Bone Marrow Biopsy] : bone marrow biopsy [Bone Marrow Aspiration] : bone marrow aspiration  [Patient] : the patient [Verbal Consent Obtained] : verbal consent was obtained prior to the procedure [Patient identification verified] : patient identification verified [Procedure verified and consent obtained] : procedure verified and consent obtained [Laterality verified and correct site marked] : laterality verified and correct site marked [Left] : site: left [Correct positioning] : correct positioning [Prone] : prone [Superior iliac spine was identified] : the superior iliac spine was identified. [The left posterior iliac crest was prepped with betadine and draped, using sterile technique.] : The left posterior iliac crest was prepped with betadine and draped, using sterile technique. [Lidocaine was injected and into the periosteum overlying the site.] : Lidocaine was injected and into the periosteum overlying the site. [Aspirate] : aspirate [Cytogenetics] : cytogenetics [FISH] : FISH [Biopsy] : biopsy [Flow Cytometry] : flow cytometry [] : The patient was instructed to remove the bandage the following AM. The patient may bathe. Acetaminophen may be taken for discomfort, as per package directions.If there are any other problems, the patient was instructed to call the office. The patient verbalized understanding, and is aware of the office contact numbers. [FreeTextEntry1] : 38 yo M with PMHx of relapsed aplastic anemia s/p tx with rATG+CSA+promacta with continued cytopenias found to have PNH s/p [employer] evaluating for AlloPBSCT [FreeTextEntry2] : 8mL of 1% Lidocaine was used for procedure\par \par WBC: 3.62\par Hgb: 7.7\par PLT: 143K\par \par Bone marrow biopsy and aspiration completed. Patient completed without difficulty. Molecular testing for PNH requested at home:

## 2023-05-26 NOTE — REASON FOR VISIT
[Bone Marrow Biopsy] : bone marrow biopsy [Bone Marrow Aspiration] : bone marrow aspiration [FreeTextEntry2] : 38 yo M with PMHx of relapsed aplastic anemia s/p tx with rATG+CSA+promacta with continued cytopenias found to have PNH s/p [employer] evaluating for AlloPBSCT

## 2023-05-30 PROCEDURE — 77332 RADIATION TREATMENT AID(S): CPT | Mod: 26

## 2023-05-30 PROCEDURE — 77290 THER RAD SIMULAJ FIELD CPLX: CPT | Mod: 26

## 2023-05-31 ENCOUNTER — APPOINTMENT (OUTPATIENT)
Dept: HEMATOLOGY ONCOLOGY | Facility: CLINIC | Age: 38
End: 2023-05-31
Payer: COMMERCIAL

## 2023-05-31 ENCOUNTER — RESULT REVIEW (OUTPATIENT)
Age: 38
End: 2023-05-31

## 2023-05-31 VITALS
SYSTOLIC BLOOD PRESSURE: 136 MMHG | OXYGEN SATURATION: 98 % | DIASTOLIC BLOOD PRESSURE: 71 MMHG | HEART RATE: 66 BPM | BODY MASS INDEX: 34.67 KG/M2 | WEIGHT: 227.94 LBS | TEMPERATURE: 97 F

## 2023-05-31 DIAGNOSIS — Z01.818 ENCOUNTER FOR OTHER PREPROCEDURAL EXAMINATION: ICD-10-CM

## 2023-05-31 LAB
ALBUMIN SERPL ELPH-MCNC: 4.9 G/DL
ALP BLD-CCNC: 102 U/L
ALT SERPL-CCNC: 57 U/L
ANION GAP SERPL CALC-SCNC: 10 MMOL/L
APTT BLD: 28.5 SEC
AST SERPL-CCNC: 30 U/L
BASOPHILS # BLD AUTO: 0.03 K/UL — SIGNIFICANT CHANGE UP (ref 0–0.2)
BASOPHILS NFR BLD AUTO: 0.7 % — SIGNIFICANT CHANGE UP (ref 0–2)
BILIRUB SERPL-MCNC: 2.3 MG/DL
BUN SERPL-MCNC: 14 MG/DL
CALCIUM SERPL-MCNC: 9.4 MG/DL
CHLORIDE SERPL-SCNC: 105 MMOL/L
CO2 SERPL-SCNC: 26 MMOL/L
CREAT SERPL-MCNC: 0.9 MG/DL
EGFR: 114 ML/MIN/1.73M2
EOSINOPHIL # BLD AUTO: 0.07 K/UL — SIGNIFICANT CHANGE UP (ref 0–0.5)
EOSINOPHIL NFR BLD AUTO: 1.6 % — SIGNIFICANT CHANGE UP (ref 0–6)
GLUCOSE SERPL-MCNC: 101 MG/DL
HCT VFR BLD CALC: 28.4 % — LOW (ref 39–50)
HGB BLD-MCNC: 9.7 G/DL — LOW (ref 13–17)
IMM GRANULOCYTES NFR BLD AUTO: 1.8 % — HIGH (ref 0–0.9)
INR PPP: 1.08 RATIO
LDH SERPL-CCNC: 292 U/L
LYMPHOCYTES # BLD AUTO: 1.65 K/UL — SIGNIFICANT CHANGE UP (ref 1–3.3)
LYMPHOCYTES # BLD AUTO: 37.1 % — SIGNIFICANT CHANGE UP (ref 13–44)
MAGNESIUM SERPL-MCNC: 2.2 MG/DL
MCHC RBC-ENTMCNC: 34.2 G/DL — SIGNIFICANT CHANGE UP (ref 32–36)
MCHC RBC-ENTMCNC: 34.6 PG — HIGH (ref 27–34)
MCV RBC AUTO: 101.4 FL — HIGH (ref 80–100)
MONOCYTES # BLD AUTO: 0.47 K/UL — SIGNIFICANT CHANGE UP (ref 0–0.9)
MONOCYTES NFR BLD AUTO: 10.6 % — SIGNIFICANT CHANGE UP (ref 2–14)
NEUTROPHILS # BLD AUTO: 2.15 K/UL — SIGNIFICANT CHANGE UP (ref 1.8–7.4)
NEUTROPHILS NFR BLD AUTO: 48.2 % — SIGNIFICANT CHANGE UP (ref 43–77)
NRBC # BLD: 0 /100 WBCS — SIGNIFICANT CHANGE UP (ref 0–0)
PLATELET # BLD AUTO: 167 K/UL — SIGNIFICANT CHANGE UP (ref 150–400)
POTASSIUM SERPL-SCNC: 4.6 MMOL/L
PROT SERPL-MCNC: 7.4 G/DL
PT BLD: 12.6 SEC
RBC # BLD: 2.8 M/UL — LOW (ref 4.2–5.8)
RBC # FLD: 20.9 % — HIGH (ref 10.3–14.5)
SODIUM SERPL-SCNC: 141 MMOL/L
WBC # BLD: 4.45 K/UL — SIGNIFICANT CHANGE UP (ref 3.8–10.5)
WBC # FLD AUTO: 4.45 K/UL — SIGNIFICANT CHANGE UP (ref 3.8–10.5)

## 2023-05-31 PROCEDURE — 99215 OFFICE O/P EST HI 40 MIN: CPT

## 2023-05-31 PROCEDURE — 77334 RADIATION TREATMENT AID(S): CPT | Mod: 26

## 2023-05-31 PROCEDURE — 77300 RADIATION THERAPY DOSE PLAN: CPT | Mod: 26

## 2023-06-01 PROBLEM — Z01.818 PRE-TRANSPLANT EVALUATION FOR STEM CELL TRANSPLANT: Status: ACTIVE | Noted: 2023-02-28

## 2023-06-01 LAB — SARS-COV-2 N GENE NPH QL NAA+PROBE: NOT DETECTED

## 2023-06-01 NOTE — ASSESSMENT
[FreeTextEntry1] : 36 y/o M with no significant PMHx diagnosed with very severe aplastic anemia in April 2020. It was confirmed on bone marrow biopsy at [facility] and now s/p inpatient treatment beginning 4/23/2020 with horse ATG + CsA + promacta with complete remission. Bone marrow bx on 11/9/20 revealed hypocellular marrow, overall cellularity improved (20-50%) showing marrow regeneration. BMBx on 5/18/2021 showing normal morphology with normal cellularity. Unfortunately patient relapsed in November 2021 with worsening cytopenias and neutropenic fever. He was admitted for treatment with rabbit ATG, prednisone, cyclosporine and Promacta with partial response  starting 12/27/21. 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 He is currently mildly symptomatic when his Hb is low.\par Discussed risks, benefits, alternatives, logistics and rationale for allogeneic stem cell transplant, quests addressed\par Severe aplastic anemia\par -continue with Ravulizumab and promacta with Dr. Goldberg\par -will finish pre-testing in the next couple of weeks\par -will likely use haplo sister as the donor, unrelated donor as back up...confirm VCM\par flu / cy / tbi prep with post transplant ctx, mmf and tacro....will confirm prep for haplo MADAY\par -initially planned to proceed with an alloSCT at the end of April to early May..pt requests admission in june...will admit 6/2 with transplant 6/8, given dx of MADAY discussed bm harvesting...sister is approx 140 pounds...collection would be 6/7.. evaluated and cleared donor...will confirm cmv status and blood types as well\par -stop csa as of today\par consents signed\par -borderline bili..??low grade hemolysis , gilbert's\par \par -RTC pot d/c\par pt to see his dentist, TBI eval completed as well as orientation\par repeat bm bx third week of may reviewed\par quests addressed\par \par I personally have spent a total of 50 minutes of time on the date of this encounter reviewing test results, documenting findings, coordinating care and directly consulting with the patient and/or designated family member.\par \par

## 2023-06-01 NOTE — HISTORY OF PRESENT ILLNESS
[de-identified] : 38 y/o M with no significant PMHx diagnosed with very severe aplastic anemia in April 2020. H confirmed on bone marrow biopsy at [facility] and now s/p inpatient treatment beginning 4/23/2020 with horse ATG + CsA + promacta with complete remission. Bone marrow bx on 11/9/20 revealed hypocellular marrow, overall cellularity improved (20-50%) showing marrow regeneration. BMBx on 5/18/2021 showing normal morphology with normal cellularity. Unfortunately patient relapsed in November 2021 with worsening cytopenias and neutropenic fever. He was admitted for treatment with rabbit ATG, prednisone, cyclosporine and Promacta with partial response  starting 12/27/21. 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 He reports doing well with ravulizumab and is able to continue working full time.\par He denies bleeding, bruising, fever, chills, night sweats, appetite changes.\par \par He presents today with his partner for follow up. He reports feeling okay, he continues to work full time. His energy, activity, and appetite are good. He however does feel fatigued when his Hb is low.  We discussed the plans for transplant. We will likely used his haplo sister as a donor and are completing work up for the [facility] as back up. He is completing his pre-testing in the coming weeks. He would like to proceed with transplant at the end of April to the beginning of May.\par \par \par PMHX:\par Asthma\par \par Allergy:\par NKDA\par \par SocialHx:\par Lives in  with partner and her family\par No children\par Manages oil terminal \par Denies smoking, ETOH use, illicit drug use\par \par \par FamilyHx:\par Father – unknown\par Mother – HTN\par Siblings – no medical history\par \par Dentist: needs to go\par  [de-identified] : he for f/u and further discussion regarding allogeneic stem cell transplant...he is feeling well,

## 2023-06-02 ENCOUNTER — INPATIENT (INPATIENT)
Facility: HOSPITAL | Age: 38
LOS: 27 days | Discharge: HOME HEALTH | DRG: 14 | End: 2023-06-30
Attending: INTERNAL MEDICINE | Admitting: INTERNAL MEDICINE
Payer: COMMERCIAL

## 2023-06-02 VITALS
HEART RATE: 82 BPM | RESPIRATION RATE: 18 BRPM | WEIGHT: 224.43 LBS | TEMPERATURE: 98 F | DIASTOLIC BLOOD PRESSURE: 77 MMHG | SYSTOLIC BLOOD PRESSURE: 132 MMHG | HEIGHT: 68.5 IN | OXYGEN SATURATION: 97 %

## 2023-06-02 DIAGNOSIS — Z94.84 STEM CELLS TRANSPLANT STATUS: ICD-10-CM

## 2023-06-02 DIAGNOSIS — D61.9 APLASTIC ANEMIA, UNSPECIFIED: ICD-10-CM

## 2023-06-02 DIAGNOSIS — Z29.9 ENCOUNTER FOR PROPHYLACTIC MEASURES, UNSPECIFIED: ICD-10-CM

## 2023-06-02 DIAGNOSIS — Z98.890 OTHER SPECIFIED POSTPROCEDURAL STATES: Chronic | ICD-10-CM

## 2023-06-02 LAB
ALBUMIN SERPL ELPH-MCNC: 4.8 G/DL — SIGNIFICANT CHANGE UP (ref 3.3–5)
ALP SERPL-CCNC: 94 U/L — SIGNIFICANT CHANGE UP (ref 40–120)
ALT FLD-CCNC: 59 U/L — HIGH (ref 10–45)
ANION GAP SERPL CALC-SCNC: 13 MMOL/L — SIGNIFICANT CHANGE UP (ref 5–17)
APPEARANCE UR: CLEAR — SIGNIFICANT CHANGE UP
APTT BLD: 26.5 SEC — LOW (ref 27.5–35.5)
AST SERPL-CCNC: 28 U/L — SIGNIFICANT CHANGE UP (ref 10–40)
BACTERIA # UR AUTO: NEGATIVE — SIGNIFICANT CHANGE UP
BASOPHILS # BLD AUTO: 0.02 K/UL — SIGNIFICANT CHANGE UP (ref 0–0.2)
BASOPHILS NFR BLD AUTO: 0.5 % — SIGNIFICANT CHANGE UP (ref 0–2)
BILIRUB DIRECT SERPL-MCNC: 0.3 MG/DL — SIGNIFICANT CHANGE UP (ref 0–0.3)
BILIRUB INDIRECT FLD-MCNC: 1.9 MG/DL — HIGH (ref 0.2–1)
BILIRUB SERPL-MCNC: 2.2 MG/DL — HIGH (ref 0.2–1.2)
BILIRUB UR-MCNC: NEGATIVE — SIGNIFICANT CHANGE UP
BUN SERPL-MCNC: 16 MG/DL — SIGNIFICANT CHANGE UP (ref 7–23)
CALCIUM SERPL-MCNC: 9.4 MG/DL — SIGNIFICANT CHANGE UP (ref 8.4–10.5)
CHLORIDE SERPL-SCNC: 104 MMOL/L — SIGNIFICANT CHANGE UP (ref 96–108)
CO2 SERPL-SCNC: 22 MMOL/L — SIGNIFICANT CHANGE UP (ref 22–31)
COLOR SPEC: YELLOW — SIGNIFICANT CHANGE UP
CREAT SERPL-MCNC: 0.84 MG/DL — SIGNIFICANT CHANGE UP (ref 0.5–1.3)
DIFF PNL FLD: NEGATIVE — SIGNIFICANT CHANGE UP
EGFR: 117 ML/MIN/1.73M2 — SIGNIFICANT CHANGE UP
EOSINOPHIL # BLD AUTO: 0.06 K/UL — SIGNIFICANT CHANGE UP (ref 0–0.5)
EOSINOPHIL NFR BLD AUTO: 1.6 % — SIGNIFICANT CHANGE UP (ref 0–6)
EPI CELLS # UR: 1 /HPF — SIGNIFICANT CHANGE UP
FIBRINOGEN PPP-MCNC: 302 MG/DL — SIGNIFICANT CHANGE UP (ref 200–445)
GLUCOSE SERPL-MCNC: 97 MG/DL — SIGNIFICANT CHANGE UP (ref 70–99)
GLUCOSE UR QL: NEGATIVE — SIGNIFICANT CHANGE UP
HCT VFR BLD CALC: 27.7 % — LOW (ref 39–50)
HGB BLD-MCNC: 9.5 G/DL — LOW (ref 13–17)
IGA FLD-MCNC: 162 MG/DL — SIGNIFICANT CHANGE UP (ref 84–499)
IGD SER-MCNC: <1 MG/DL — SIGNIFICANT CHANGE UP
IGG FLD-MCNC: 1203 MG/DL — SIGNIFICANT CHANGE UP (ref 610–1660)
IGM SERPL-MCNC: 101 MG/DL — SIGNIFICANT CHANGE UP (ref 35–242)
IMM GRANULOCYTES NFR BLD AUTO: 0.8 % — SIGNIFICANT CHANGE UP (ref 0–0.9)
INR BLD: 1.08 RATIO — SIGNIFICANT CHANGE UP (ref 0.88–1.16)
KAPPA LC SER QL IFE: 2.01 MG/DL — HIGH (ref 0.33–1.94)
KAPPA/LAMBDA FREE LIGHT CHAIN RATIO, SERUM: 1.34 RATIO — SIGNIFICANT CHANGE UP (ref 0.26–1.65)
KETONES UR-MCNC: NEGATIVE — SIGNIFICANT CHANGE UP
LAMBDA LC SER QL IFE: 1.5 MG/DL — SIGNIFICANT CHANGE UP (ref 0.57–2.63)
LDH SERPL L TO P-CCNC: 271 U/L — HIGH (ref 50–242)
LEUKOCYTE ESTERASE UR-ACNC: NEGATIVE — SIGNIFICANT CHANGE UP
LYMPHOCYTES # BLD AUTO: 1.27 K/UL — SIGNIFICANT CHANGE UP (ref 1–3.3)
LYMPHOCYTES # BLD AUTO: 33.9 % — SIGNIFICANT CHANGE UP (ref 13–44)
MAGNESIUM SERPL-MCNC: 2.2 MG/DL — SIGNIFICANT CHANGE UP (ref 1.6–2.6)
MCHC RBC-ENTMCNC: 34.3 GM/DL — SIGNIFICANT CHANGE UP (ref 32–36)
MCHC RBC-ENTMCNC: 35.1 PG — HIGH (ref 27–34)
MCV RBC AUTO: 102.2 FL — HIGH (ref 80–100)
MONOCYTES # BLD AUTO: 0.5 K/UL — SIGNIFICANT CHANGE UP (ref 0–0.9)
MONOCYTES NFR BLD AUTO: 13.3 % — SIGNIFICANT CHANGE UP (ref 2–14)
NEUTROPHILS # BLD AUTO: 1.87 K/UL — SIGNIFICANT CHANGE UP (ref 1.8–7.4)
NEUTROPHILS NFR BLD AUTO: 49.9 % — SIGNIFICANT CHANGE UP (ref 43–77)
NITRITE UR-MCNC: NEGATIVE — SIGNIFICANT CHANGE UP
NRBC # BLD: 1 /100 WBCS — HIGH (ref 0–0)
PH UR: 6.5 — SIGNIFICANT CHANGE UP (ref 5–8)
PHOSPHATE SERPL-MCNC: 3.1 MG/DL — SIGNIFICANT CHANGE UP (ref 2.5–4.5)
PLATELET # BLD AUTO: 154 K/UL — SIGNIFICANT CHANGE UP (ref 150–400)
POTASSIUM SERPL-MCNC: 3.8 MMOL/L — SIGNIFICANT CHANGE UP (ref 3.5–5.3)
POTASSIUM SERPL-SCNC: 3.8 MMOL/L — SIGNIFICANT CHANGE UP (ref 3.5–5.3)
PROT SERPL-MCNC: 7.5 G/DL — SIGNIFICANT CHANGE UP (ref 6–8.3)
PROT UR-MCNC: ABNORMAL
PROTHROM AB SERPL-ACNC: 12.4 SEC — SIGNIFICANT CHANGE UP (ref 10.5–13.4)
RBC # BLD: 2.71 M/UL — LOW (ref 4.2–5.8)
RBC # BLD: 2.71 M/UL — LOW (ref 4.2–5.8)
RBC # FLD: 21.5 % — HIGH (ref 10.3–14.5)
RBC CASTS # UR COMP ASSIST: 3 /HPF — SIGNIFICANT CHANGE UP (ref 0–4)
RETICS #: 288.9 K/UL — HIGH (ref 25–125)
RETICS/RBC NFR: 10.7 % — HIGH (ref 0.5–2.5)
SODIUM SERPL-SCNC: 139 MMOL/L — SIGNIFICANT CHANGE UP (ref 135–145)
SP GR SPEC: 1.03 — HIGH (ref 1.01–1.02)
SP GR UR STRIP: 1 — LOW (ref 1.01–1.02)
SP GR UR STRIP: 1.02 — SIGNIFICANT CHANGE UP (ref 1.01–1.02)
UROBILINOGEN FLD QL: ABNORMAL
WBC # BLD: 3.75 K/UL — LOW (ref 3.8–10.5)
WBC # FLD AUTO: 3.75 K/UL — LOW (ref 3.8–10.5)
WBC UR QL: 2 /HPF — SIGNIFICANT CHANGE UP (ref 0–5)

## 2023-06-02 PROCEDURE — 36556 INSERT NON-TUNNEL CV CATH: CPT

## 2023-06-02 PROCEDURE — 36556 INSERT NON-TUNNEL CV CATH: CPT | Mod: RT

## 2023-06-02 PROCEDURE — 99291 CRITICAL CARE FIRST HOUR: CPT

## 2023-06-02 PROCEDURE — 77001 FLUOROGUIDE FOR VEIN DEVICE: CPT | Mod: 26

## 2023-06-02 PROCEDURE — 76937 US GUIDE VASCULAR ACCESS: CPT | Mod: 26

## 2023-06-02 RX ORDER — SODIUM BICARBONATE 1 MEQ/ML
0.07 SYRINGE (ML) INTRAVENOUS
Qty: 50 | Refills: 0 | Status: DISCONTINUED | OUTPATIENT
Start: 2023-06-07 | End: 2023-06-30

## 2023-06-02 RX ORDER — MESNA 100 MG/ML
1200 INJECTION, SOLUTION INTRAVENOUS
Refills: 0 | Status: COMPLETED | OUTPATIENT
Start: 2023-06-12 | End: 2023-06-13

## 2023-06-02 RX ORDER — SODIUM CHLORIDE 9 MG/ML
10 INJECTION INTRAMUSCULAR; INTRAVENOUS; SUBCUTANEOUS
Refills: 0 | Status: DISCONTINUED | OUTPATIENT
Start: 2023-06-02 | End: 2023-06-30

## 2023-06-02 RX ORDER — FAMOTIDINE 10 MG/ML
20 INJECTION INTRAVENOUS
Refills: 0 | Status: DISCONTINUED | OUTPATIENT
Start: 2023-06-02 | End: 2023-06-30

## 2023-06-02 RX ORDER — MESNA 100 MG/ML
1200 INJECTION, SOLUTION INTRAVENOUS
Refills: 0 | Status: COMPLETED | OUTPATIENT
Start: 2023-06-11 | End: 2023-06-11

## 2023-06-02 RX ORDER — SODIUM BICARBONATE 1 MEQ/ML
10 SYRINGE (ML) INTRAVENOUS
Refills: 0 | Status: DISCONTINUED | OUTPATIENT
Start: 2023-06-02 | End: 2023-06-30

## 2023-06-02 RX ORDER — FOLIC ACID 0.8 MG
1 TABLET ORAL DAILY
Refills: 0 | Status: DISCONTINUED | OUTPATIENT
Start: 2023-06-02 | End: 2023-06-30

## 2023-06-02 RX ORDER — HYDROCORTISONE 20 MG
50 TABLET ORAL ONCE
Refills: 0 | Status: COMPLETED | OUTPATIENT
Start: 2023-06-08 | End: 2023-06-08

## 2023-06-02 RX ORDER — SODIUM CHLORIDE 9 MG/ML
1000 INJECTION INTRAMUSCULAR; INTRAVENOUS; SUBCUTANEOUS
Refills: 0 | Status: DISCONTINUED | OUTPATIENT
Start: 2023-06-02 | End: 2023-06-30

## 2023-06-02 RX ORDER — SODIUM CHLORIDE 9 MG/ML
1000 INJECTION, SOLUTION INTRAVENOUS
Refills: 0 | Status: DISCONTINUED | OUTPATIENT
Start: 2023-06-10 | End: 2023-06-30

## 2023-06-02 RX ORDER — MESNA 100 MG/ML
1200 INJECTION, SOLUTION INTRAVENOUS ONCE
Refills: 0 | Status: COMPLETED | OUTPATIENT
Start: 2023-06-02 | End: 2023-06-02

## 2023-06-02 RX ORDER — CHLORHEXIDINE GLUCONATE 213 G/1000ML
1 SOLUTION TOPICAL
Refills: 0 | Status: DISCONTINUED | OUTPATIENT
Start: 2023-06-02 | End: 2023-06-30

## 2023-06-02 RX ORDER — CYCLOPHOSPHAMIDE 100 MG
1150 VIAL (EA) INTRAVENOUS DAILY
Refills: 0 | Status: COMPLETED | OUTPATIENT
Start: 2023-06-03 | End: 2023-06-03

## 2023-06-02 RX ORDER — ACETAMINOPHEN 500 MG
650 TABLET ORAL EVERY 6 HOURS
Refills: 0 | Status: DISCONTINUED | OUTPATIENT
Start: 2023-06-02 | End: 2023-06-30

## 2023-06-02 RX ORDER — LIDOCAINE AND PRILOCAINE CREAM 25; 25 MG/G; MG/G
1 CREAM TOPICAL DAILY
Refills: 0 | Status: DISCONTINUED | OUTPATIENT
Start: 2023-06-13 | End: 2023-06-30

## 2023-06-02 RX ORDER — ONDANSETRON 8 MG/1
8 TABLET, FILM COATED ORAL EVERY 8 HOURS
Refills: 0 | Status: COMPLETED | OUTPATIENT
Start: 2023-06-02 | End: 2023-06-14

## 2023-06-02 RX ORDER — MYCOPHENOLATE MOFETIL 250 MG/1
1000 CAPSULE ORAL THREE TIMES A DAY
Refills: 0 | Status: DISCONTINUED | OUTPATIENT
Start: 2023-06-13 | End: 2023-06-19

## 2023-06-02 RX ORDER — DIPHENHYDRAMINE HCL 50 MG
25 CAPSULE ORAL ONCE
Refills: 0 | Status: COMPLETED | OUTPATIENT
Start: 2023-06-06 | End: 2023-06-06

## 2023-06-02 RX ORDER — MESNA 100 MG/ML
1200 INJECTION, SOLUTION INTRAVENOUS ONCE
Refills: 0 | Status: COMPLETED | OUTPATIENT
Start: 2023-06-03 | End: 2023-06-03

## 2023-06-02 RX ORDER — FUROSEMIDE 40 MG
20 TABLET ORAL ONCE
Refills: 0 | Status: COMPLETED | OUTPATIENT
Start: 2023-06-12 | End: 2023-06-12

## 2023-06-02 RX ORDER — CYCLOPHOSPHAMIDE 100 MG
3860 VIAL (EA) INTRAVENOUS DAILY
Refills: 0 | Status: COMPLETED | OUTPATIENT
Start: 2023-06-12 | End: 2023-06-12

## 2023-06-02 RX ORDER — ACETAMINOPHEN 500 MG
650 TABLET ORAL ONCE
Refills: 0 | Status: COMPLETED | OUTPATIENT
Start: 2023-06-08 | End: 2023-06-08

## 2023-06-02 RX ORDER — ACYCLOVIR SODIUM 500 MG
400 VIAL (EA) INTRAVENOUS EVERY 8 HOURS
Refills: 0 | Status: DISCONTINUED | OUTPATIENT
Start: 2023-06-02 | End: 2023-06-23

## 2023-06-02 RX ORDER — APREPITANT 80 MG/1
125 CAPSULE ORAL ONCE
Refills: 0 | Status: COMPLETED | OUTPATIENT
Start: 2023-06-02 | End: 2023-06-02

## 2023-06-02 RX ORDER — FLUDARABINE PHOSPHATE 25 MG/ML
58 INJECTION, SOLUTION INTRAVENOUS EVERY 24 HOURS
Refills: 0 | Status: COMPLETED | OUTPATIENT
Start: 2023-06-02 | End: 2023-06-06

## 2023-06-02 RX ORDER — FUROSEMIDE 40 MG
40 TABLET ORAL ONCE
Refills: 0 | Status: COMPLETED | OUTPATIENT
Start: 2023-06-03 | End: 2023-06-03

## 2023-06-02 RX ORDER — FILGRASTIM 480MCG/1.6
480 VIAL (ML) INJECTION EVERY 24 HOURS
Refills: 0 | Status: DISCONTINUED | OUTPATIENT
Start: 2023-06-13 | End: 2023-06-30

## 2023-06-02 RX ORDER — TACROLIMUS 5 MG/1
1 CAPSULE ORAL
Refills: 0 | Status: DISCONTINUED | OUTPATIENT
Start: 2023-06-13 | End: 2023-06-15

## 2023-06-02 RX ORDER — PANTOPRAZOLE SODIUM 20 MG/1
40 TABLET, DELAYED RELEASE ORAL
Refills: 0 | Status: DISCONTINUED | OUTPATIENT
Start: 2023-06-02 | End: 2023-06-02

## 2023-06-02 RX ORDER — FUROSEMIDE 40 MG
40 TABLET ORAL ONCE
Refills: 0 | Status: COMPLETED | OUTPATIENT
Start: 2023-06-04 | End: 2023-06-04

## 2023-06-02 RX ORDER — SALIVA SUBSTITUTE COMB NO.11 351 MG
5 POWDER IN PACKET (EA) MUCOUS MEMBRANE
Refills: 0 | Status: DISCONTINUED | OUTPATIENT
Start: 2023-06-02 | End: 2023-06-30

## 2023-06-02 RX ORDER — HEPARIN SODIUM 5000 [USP'U]/ML
424 INJECTION INTRAVENOUS; SUBCUTANEOUS
Qty: 25000 | Refills: 0 | Status: DISCONTINUED | OUTPATIENT
Start: 2023-06-02 | End: 2023-06-30

## 2023-06-02 RX ORDER — CYCLOPHOSPHAMIDE 100 MG
1150 VIAL (EA) INTRAVENOUS DAILY
Refills: 0 | Status: COMPLETED | OUTPATIENT
Start: 2023-06-02 | End: 2023-06-02

## 2023-06-02 RX ORDER — CLOTRIMAZOLE 10 MG
1 TROCHE MUCOUS MEMBRANE
Refills: 0 | Status: DISCONTINUED | OUTPATIENT
Start: 2023-06-02 | End: 2023-06-30

## 2023-06-02 RX ORDER — APREPITANT 80 MG/1
80 CAPSULE ORAL EVERY 24 HOURS
Refills: 0 | Status: COMPLETED | OUTPATIENT
Start: 2023-06-03 | End: 2023-06-13

## 2023-06-02 RX ORDER — FUROSEMIDE 40 MG
40 TABLET ORAL ONCE
Refills: 0 | Status: COMPLETED | OUTPATIENT
Start: 2023-06-02 | End: 2023-06-02

## 2023-06-02 RX ORDER — ACETAMINOPHEN 500 MG
650 TABLET ORAL ONCE
Refills: 0 | Status: COMPLETED | OUTPATIENT
Start: 2023-06-06 | End: 2023-06-06

## 2023-06-02 RX ORDER — DIPHENHYDRAMINE HCL 50 MG
25 CAPSULE ORAL ONCE
Refills: 0 | Status: COMPLETED | OUTPATIENT
Start: 2023-06-08 | End: 2023-06-08

## 2023-06-02 RX ORDER — METOCLOPRAMIDE HCL 10 MG
10 TABLET ORAL EVERY 6 HOURS
Refills: 0 | Status: DISCONTINUED | OUTPATIENT
Start: 2023-06-02 | End: 2023-06-06

## 2023-06-02 RX ORDER — FLUCONAZOLE 150 MG/1
400 TABLET ORAL DAILY
Refills: 0 | Status: DISCONTINUED | OUTPATIENT
Start: 2023-06-02 | End: 2023-06-30

## 2023-06-02 RX ORDER — SENNA PLUS 8.6 MG/1
1 TABLET ORAL AT BEDTIME
Refills: 0 | Status: DISCONTINUED | OUTPATIENT
Start: 2023-06-02 | End: 2023-06-30

## 2023-06-02 RX ORDER — FUROSEMIDE 40 MG
20 TABLET ORAL ONCE
Refills: 0 | Status: COMPLETED | OUTPATIENT
Start: 2023-06-11 | End: 2023-06-11

## 2023-06-02 RX ORDER — URSODIOL 250 MG/1
300 TABLET, FILM COATED ORAL
Refills: 0 | Status: DISCONTINUED | OUTPATIENT
Start: 2023-06-02 | End: 2023-06-30

## 2023-06-02 RX ORDER — CYCLOPHOSPHAMIDE 100 MG
3860 VIAL (EA) INTRAVENOUS DAILY
Refills: 0 | Status: COMPLETED | OUTPATIENT
Start: 2023-06-11 | End: 2023-06-11

## 2023-06-02 RX ORDER — IMMUNE GLOBULIN (HUMAN) 10 G/100ML
30 INJECTION INTRAVENOUS; SUBCUTANEOUS ONCE
Refills: 0 | Status: COMPLETED | OUTPATIENT
Start: 2023-06-06 | End: 2023-06-06

## 2023-06-02 RX ADMIN — SODIUM CHLORIDE 1000 MILLILITER(S): 9 INJECTION INTRAMUSCULAR; INTRAVENOUS; SUBCUTANEOUS at 12:51

## 2023-06-02 RX ADMIN — Medication 10 MILLILITER(S): at 20:58

## 2023-06-02 RX ADMIN — URSODIOL 300 MILLIGRAM(S): 250 TABLET, FILM COATED ORAL at 17:30

## 2023-06-02 RX ADMIN — MESNA 1200 MILLIGRAM(S): 100 INJECTION, SOLUTION INTRAVENOUS at 20:58

## 2023-06-02 RX ADMIN — Medication 1 LOZENGE: at 20:58

## 2023-06-02 RX ADMIN — FAMOTIDINE 20 MILLIGRAM(S): 10 INJECTION INTRAVENOUS at 17:33

## 2023-06-02 RX ADMIN — Medication 5 MILLILITER(S): at 20:59

## 2023-06-02 RX ADMIN — Medication 1 MILLIGRAM(S): at 12:35

## 2023-06-02 RX ADMIN — Medication 650 MILLIGRAM(S): at 22:08

## 2023-06-02 RX ADMIN — Medication 1150 MILLIGRAM(S): at 21:26

## 2023-06-02 RX ADMIN — Medication 10 MILLILITER(S): at 15:14

## 2023-06-02 RX ADMIN — FLUDARABINE PHOSPHATE 58 MILLIGRAM(S): 25 INJECTION, SOLUTION INTRAVENOUS at 18:01

## 2023-06-02 RX ADMIN — Medication 650 MILLIGRAM(S): at 21:08

## 2023-06-02 RX ADMIN — Medication 400 MILLIGRAM(S): at 15:15

## 2023-06-02 RX ADMIN — APREPITANT 125 MILLIGRAM(S): 80 CAPSULE ORAL at 16:25

## 2023-06-02 RX ADMIN — ONDANSETRON 8 MILLIGRAM(S): 8 TABLET, FILM COATED ORAL at 16:25

## 2023-06-02 RX ADMIN — Medication 1 TABLET(S): at 17:30

## 2023-06-02 RX ADMIN — Medication 1 LOZENGE: at 15:15

## 2023-06-02 RX ADMIN — Medication 400 MILLIGRAM(S): at 22:29

## 2023-06-02 RX ADMIN — Medication 40 MILLIGRAM(S): at 17:30

## 2023-06-02 RX ADMIN — Medication 5 MILLILITER(S): at 12:48

## 2023-06-02 RX ADMIN — Medication 650 MILLIGRAM(S): at 12:35

## 2023-06-02 RX ADMIN — FLUCONAZOLE 400 MILLIGRAM(S): 150 TABLET ORAL at 12:34

## 2023-06-02 RX ADMIN — Medication 1 TABLET(S): at 12:34

## 2023-06-02 RX ADMIN — SODIUM CHLORIDE 20 MILLILITER(S): 9 INJECTION INTRAMUSCULAR; INTRAVENOUS; SUBCUTANEOUS at 13:53

## 2023-06-02 RX ADMIN — Medication 1 LOZENGE: at 12:49

## 2023-06-02 RX ADMIN — ONDANSETRON 8 MILLIGRAM(S): 8 TABLET, FILM COATED ORAL at 22:29

## 2023-06-02 RX ADMIN — Medication 5 MILLILITER(S): at 15:15

## 2023-06-02 RX ADMIN — Medication 650 MILLIGRAM(S): at 13:00

## 2023-06-02 RX ADMIN — CHLORHEXIDINE GLUCONATE 1 APPLICATION(S): 213 SOLUTION TOPICAL at 12:48

## 2023-06-02 NOTE — H&P ADULT - HISTORY OF PRESENT ILLNESS
This is a 37 year old male with severe aplastic anemia admitted for a haplo-identical pbsct from his sister (6/12) with Flu / Cy / TBI prep regimen. Hematologic history as follows: initially diagnosed by bone marrow biopsy 4/20, began treatment 4/23/20 with horse ATG + CSA + promacta and achieved CR1. A bone marrow biopsy 11/9/20 showed hypocellular marrow, with overall improved cellularity showing marrow regeneration. Bone marrow biopsy 5/18/21 showed normal morphology and normal cellularity. He relapsed 11/2021, developing worsening cytopenias and neutropenic fevers. He was then treated with rabbit ATG, prednisone, CSA and promacta with partial response 12/27/21. He later developed worsening hemolysis and pancytopenia, with a rising PNH clone and was treated with Ravulizumab. He has no complaints on admission.  This is a 37 year old male with severe aplastic anemia admitted for a haplo-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 Hematologic history as follows: initially diagnosed by bone marrow biopsy 4/20, began treatment 4/23/20 with horse ATG + CSA + promacta and achieved CR1. A bone marrow biopsy 11/9/20 showed hypocellular marrow, with overall improved cellularity showing marrow regeneration. Bone marrow biopsy 5/18/21 showed normal morphology and normal cellularity. He relapsed 11/2021, developing worsening cytopenias and neutropenic fevers. He was then treated with rabbit ATG, prednisone, CSA and promacta with partial response 12/27/21. He later developed worsening hemolysis and pancytopenia, with a rising PNH clone and was treated with Ravulizumab. He has no complaints on admission.

## 2023-06-02 NOTE — H&P ADULT - ASSESSMENT
37 year old male with severe aplastic anemia with multiple lines of treatment admitted for a haplo-identical pbsct with high dose Flu / Cy / TBI prep regimen  37 year old male with severe aplastic anemia with multiple lines of treatment admitted for a haplo-identical bone marrow transplant with  Flu / Cy / TBI preparative regimen

## 2023-06-02 NOTE — PATIENT PROFILE ADULT - FALL HARM RISK - HARM RISK INTERVENTIONS

## 2023-06-02 NOTE — H&P ADULT - REASON FOR ADMISSION
Haplo-identical pbsct from his sister (6/12) with Flu / Cy / TBI prep regimen for treatment of severe aplastic anemia Haplo-identical bone marrow transplant from his sister (6/12) with Flu / Cy / TBI prep regimen for treatment of severe aplastic anemia

## 2023-06-02 NOTE — ADVANCED PRACTICE NURSE CONSULT - ASSESSMENT
Pt. seen in bed a/ox4,denies any discomfort at this time. Chemotherapy teachings done.Pt. verbalized understanding. Pt. has right TLC inserted today . accessed by primary RN with NS  .Dsg dry and intact with small blood noted. no bleeding noted .Site with no s/s of redness ,swelling or pain. With positive blood return noted and flushing easily with 10 ML NS. Drug verification done by 2 RN.'s.  Lab. values reviewed by Dr. Daniel  prior to signing orders. T. Bili = 2.2 . Severo BEARD aware . Ok to give chemo. Pt. received zofran 8 mg IVP and emend 125 mg po  as premedications. at 1801, fludarabine IVPB (eMAR) [Known as FLUDARA EMAR] - Start Date: 02-Jun-2023/Day (-6) to Day (-2))  58 milliGRAM(s) in sodium chloride 0.9% 100 milliLiter(s), IV Intermittent, every 24 hours, infuse over 30 Minute(s), infuse at 204.64 mL/Hr, attached as a secondary line of saline line to alaris pump. Left pt.   comfortable in bed .Primary RN aware of present treatment.

## 2023-06-02 NOTE — PATIENT PROFILE ADULT - DO YOU FEEL LIKE HURTING YOURSELF OR OTHERS?
Problem: At Risk for Falls  Goal: # Patient does not fall  Outcome: Outcome Met, Continue evaluating goal progress toward completion  Goal: # Takes action to control fall-related risks  Outcome: Outcome Met, Continue evaluating goal progress toward completion     Problem: Stroke: Ischemic (Transient/Permanent)  Goal: Normal temperature is maintained  Outcome: Outcome Met, Continue evaluating goal progress toward completion  Goal: Elimination status is maintained/returned to baseline  Description: Remove indwelling urinary catheter as soon as possible or collaborate with provider for order/reason for continued use.   Outcome: Outcome Met, Continue evaluating goal progress toward completion  Goal: #Depressive s/s (self-reported/observed) are recognized and monitored  Outcome: Outcome Met, Continue evaluating goal progress toward completion     Problem: Pain  Goal: #Acceptable pain level achieved/maintained at rest using NRS/Faces  Description: This goal is used for patients who can self-report.  Acceptable means the level is at or below the identified comfort/function goal.  Outcome: Outcome Met, Continue evaluating goal progress toward completion     Problem: Pressure Injury, Risk for  Goal: # Skin remains intact  Outcome: Outcome Met, Continue evaluating goal progress toward completion     Problem: VTE, Risk for  Goal: # No s/s of VTE  Outcome: Outcome Met, Continue evaluating goal progress toward completion      no

## 2023-06-02 NOTE — PRE PROCEDURE NOTE - PRE PROCEDURE EVALUATION
Interventional Radiology    HPI: 37y Male with with Aplastic Anemia here today in IR for Non Tunneled CVC    Allergies: No Known Allergies    Medications (Abx/Cardiac/Anticoagulation/Blood Products)      Data:  174  101.8  T(C): 36.8  HR: 82  BP: 132/77  RR: 18  SpO2: 97%    Exam  General: No acute distress  Chest: Non labored breathing  Abdomen: Non-distended  Extremities: No swelling, warm    -WBC 4.45 / HgB 9.7 / Hct 28.4 / Plt 167    Plan: 37y Male presents for Non Tunneled CVC  -Risks/Benefits/alternatives explained with the patient and/or healthcare proxy and witnessed informed consent obtained.

## 2023-06-02 NOTE — H&P ADULT - CRITICAL CARE ATTENDING COMMENT
37 year old male with severe aplastic anemia with multiple lines of treatment admitted for a haplo-identical bone marrow transplant with  Flu / Cy / TBI preparative regimen     Today is day -6    1. Admit to BMTU   2. TLC placement in IR   3. Day -6 - day + 5 - antiemetics   4. Day - 6 start CTX hydration - 1/2NS @ 200ml /hr    5. Strict I&O, daily weights, prn diuresis   6. Day -6 - day -2 - Fludarabine 30mg/m2 IV  7. Day -6 - day - 5 - CTX 14.5mg/kg/day IV. CTX to start after urine SG < 1.010. Mesna  12mg / kg - hemorrhagic cystitis ppx   8. Day -2 - IVIG 0.4 g/kg (ideal body weight) every 3 weeks. Premedication with Tylenol and benadryl  9. Day -1 -  cGy x 1 dose   10. Day - 1 - at 2200 begin transplant hydration : 0.45Ns + 1 amp (50mEq) sodium bicarbonate at 150ml /hr; continue transplant hydration for 24 hours post infusion   11. Day 0 – HPC transplant   12. Day + 2 at 2200 - begin CTX hydration (0.45NS + 10 mEq KCl/ @150ml /m2  continue 24 hours post last dose of CTX   13. Day + 3 - + 4 - CTX 50mg/kg/day IV. Begin CTX when urine SG < 1.010. EKG daily. Mesna for hemorraghic cystitis ppx.   14. Day + 5 - start Zarxio,  MMF and Tacrolimus. Check Tacrolimus levels on Monday and Thursday.   15. Anxiolytics, antinausea, antidiarrhea medications as needed   16. Lasix PRN while being aggressively hydrated to avoid VOD   17. Nutritional support, pain management  Need for prophylactic measures:  1. VOD prophylaxis - low dose heparin gtt (dosed at 100 units / kg / day), glutamine supplementation, Actigall BID   2. PCP prophylaxis - Bactrim DS through day -2    3. Antiviral prophylaxis - Continue Acyclovir   4. Antifungal prophylaxis- Diflucan 400 mg po daily.  5.. GI prophylaxis - Protonix po QD   6. Antibacterial prophylaxis - when ANC < 500, start Cipro 500mg po BID. If becomes febrile, pan cx, CXR and change Cipro to Cefepime 2g IV q 8 hours. Continue until count recovery  7. Aggressive mouth care and skin care as per protocol  8. GVHD prophylaxis- high dose CTX; MMF and Tacrolimus

## 2023-06-02 NOTE — H&P ADULT - PROBLEM SELECTOR PLAN 2
1. VOD prophylaxis - low dose heparin gtt (dosed at 100 units / kg / day), glutamine supplementation, Actigall BID   2. PCP prophylaxis - Bactrim DS through day -2    3. Antiviral prophylaxis - Continue Acyclovir   4. Antifungal prophylaxis- Diflucan 400 mg po daily.  5.. GI prophylaxis - Protonix po QD   6. Antibacterial prophylaxis - when ANC < 500, start Cipro 500mg po BID. If becomes febrile, pan cx, CXR and change Cipro to Cefepime 2g IV q 8 hours. Continue until count recovery  7. Aggressive mouth care and skin care as per protocol 1. VOD prophylaxis - low dose heparin gtt (dosed at 100 units / kg / day), glutamine supplementation, Actigall BID   2. PCP prophylaxis - Bactrim DS through day -2    3. Antiviral prophylaxis - Continue Acyclovir   4. Antifungal prophylaxis- Diflucan 400 mg po daily.  5.. GI prophylaxis - Protonix po QD   6. Antibacterial prophylaxis - when ANC < 500, start Cipro 500mg po BID. If becomes febrile, pan cx, CXR and change Cipro to Cefepime 2g IV q 8 hours. Continue until count recovery  7. Aggressive mouth care and skin care as per protocol  8. GVHD prophylaxis- high dose CTX; MMF and Tacrolimus

## 2023-06-02 NOTE — H&P ADULT - PROBLEM SELECTOR PLAN 1
1. Admit to BMTU   2. TLC placement in IR   3. Day -6 - day + 5 - antiemetics   4. Day - 6 start CTX hydration - 1/2NS @ 200ml /hr    5. Strict I&O, daily weights, prn diuresis   6. Day -6 - day -2 - Fludarabine 30mg/m2   7. Day -6 - day - 5 - CTX 14.5mg/kg/day. CTX to start after urine SG < 1.010. Mesna  12mg / kg - hemorrhagic cystitis ppx   8. Day -2 - IVIG 0.4 g/kg (ideal body weight) every 3 weeks. Premedication with Tylenol and benadryl  9. Day -1 -  cGy x 1 dose   10. Day - 1 - at 2200 begin transplant hydration : 0.45Ns + 1 amp (50mEq) sodium bicarbonate at 150ml /hr; continue transplant hydration for 24 hours post infusion   11. Day 0 – HPC transplant   12. Day + 2 at 2200 - begin CTX hydration (0.45NS + 10 mEq KCl/ @150ml /m2  continue 24 hours post last dose of CTX   13. Day + 3 - + 4 - CTX 50mg/kg.day. Begin CTX when urine SG < 1.010. EKG daily. Mesna for hemorraghic cystitis ppx.   14. Day + 5 - start Zarxio,  MMF and Tacrolimus. Check Tacrolimus levels on Monday and Thursday.   15. Anxiolytics, antinausea, antidiarrhea medications as needed   16. Lasix PRN while being aggressively hydrated to avoid VOD   17. Nutritional support, pain management 1. Admit to BMTU   2. TLC placement in IR   3. Day -6 - day + 5 - antiemetics   4. Day - 6 start CTX hydration - 1/2NS @ 200ml /hr    5. Strict I&O, daily weights, prn diuresis   6. Day -6 - day -2 - Fludarabine 30mg/m2 IV  7. Day -6 - day - 5 - CTX 14.5mg/kg/day IV. CTX to start after urine SG < 1.010. Mesna  12mg / kg - hemorrhagic cystitis ppx   8. Day -2 - IVIG 0.4 g/kg (ideal body weight) every 3 weeks. Premedication with Tylenol and benadryl  9. Day -1 -  cGy x 1 dose   10. Day - 1 - at 2200 begin transplant hydration : 0.45Ns + 1 amp (50mEq) sodium bicarbonate at 150ml /hr; continue transplant hydration for 24 hours post infusion   11. Day 0 – HPC transplant   12. Day + 2 at 2200 - begin CTX hydration (0.45NS + 10 mEq KCl/ @150ml /m2  continue 24 hours post last dose of CTX   13. Day + 3 - + 4 - CTX 50mg/kg/day IV. Begin CTX when urine SG < 1.010. EKG daily. Mesna for hemorraghic cystitis ppx.   14. Day + 5 - start Zarxio,  MMF and Tacrolimus. Check Tacrolimus levels on Monday and Thursday.   15. Anxiolytics, antinausea, antidiarrhea medications as needed   16. Lasix PRN while being aggressively hydrated to avoid VOD   17. Nutritional support, pain management

## 2023-06-02 NOTE — H&P ADULT - GASTROINTESTINAL
details… soft/nontender/nondistended/normal active bowel sounds soft/nontender/nondistended/normal active bowel sounds/no organomegaly

## 2023-06-02 NOTE — PROCEDURE NOTE - NSPOSTPRCRAD_GEN_A_CORE
Fluoroscopy/central line located in the superior vena cava/no pneumothorax/post-procedure radiography performed

## 2023-06-03 LAB
ALBUMIN SERPL ELPH-MCNC: 4.9 G/DL — SIGNIFICANT CHANGE UP (ref 3.3–5)
ALP SERPL-CCNC: 90 U/L — SIGNIFICANT CHANGE UP (ref 40–120)
ALT FLD-CCNC: 56 U/L — HIGH (ref 10–45)
ANION GAP SERPL CALC-SCNC: 13 MMOL/L — SIGNIFICANT CHANGE UP (ref 5–17)
AST SERPL-CCNC: 25 U/L — SIGNIFICANT CHANGE UP (ref 10–40)
BASOPHILS # BLD AUTO: 0 K/UL — SIGNIFICANT CHANGE UP (ref 0–0.2)
BASOPHILS NFR BLD AUTO: 0 % — SIGNIFICANT CHANGE UP (ref 0–2)
BILIRUB DIRECT SERPL-MCNC: 0.3 MG/DL — SIGNIFICANT CHANGE UP (ref 0–0.3)
BILIRUB INDIRECT FLD-MCNC: 3 MG/DL — HIGH (ref 0.2–1)
BILIRUB SERPL-MCNC: 3.3 MG/DL — HIGH (ref 0.2–1.2)
BILIRUB SERPL-MCNC: 3.3 MG/DL — HIGH (ref 0.2–1.2)
BUN SERPL-MCNC: 13 MG/DL — SIGNIFICANT CHANGE UP (ref 7–23)
CALCIUM SERPL-MCNC: 9 MG/DL — SIGNIFICANT CHANGE UP (ref 8.4–10.5)
CHLORIDE SERPL-SCNC: 101 MMOL/L — SIGNIFICANT CHANGE UP (ref 96–108)
CO2 SERPL-SCNC: 23 MMOL/L — SIGNIFICANT CHANGE UP (ref 22–31)
CREAT SERPL-MCNC: 0.97 MG/DL — SIGNIFICANT CHANGE UP (ref 0.5–1.3)
EGFR: 104 ML/MIN/1.73M2 — SIGNIFICANT CHANGE UP
EOSINOPHIL # BLD AUTO: 0.08 K/UL — SIGNIFICANT CHANGE UP (ref 0–0.5)
EOSINOPHIL NFR BLD AUTO: 2.6 % — SIGNIFICANT CHANGE UP (ref 0–6)
GLUCOSE SERPL-MCNC: 93 MG/DL — SIGNIFICANT CHANGE UP (ref 70–99)
HCT VFR BLD CALC: 26.4 % — LOW (ref 39–50)
HGB BLD-MCNC: 9.3 G/DL — LOW (ref 13–17)
IGE SERPL-ACNC: 210 KU/L — HIGH
LDH SERPL L TO P-CCNC: 274 U/L — HIGH (ref 50–242)
LYMPHOCYTES # BLD AUTO: 0.85 K/UL — LOW (ref 1–3.3)
LYMPHOCYTES # BLD AUTO: 28.9 % — SIGNIFICANT CHANGE UP (ref 13–44)
MAGNESIUM SERPL-MCNC: 2 MG/DL — SIGNIFICANT CHANGE UP (ref 1.6–2.6)
MANUAL SMEAR VERIFICATION: SIGNIFICANT CHANGE UP
MCHC RBC-ENTMCNC: 35.2 GM/DL — SIGNIFICANT CHANGE UP (ref 32–36)
MCHC RBC-ENTMCNC: 36.3 PG — HIGH (ref 27–34)
MCV RBC AUTO: 103.1 FL — HIGH (ref 80–100)
MONOCYTES # BLD AUTO: 0.16 K/UL — SIGNIFICANT CHANGE UP (ref 0–0.9)
MONOCYTES NFR BLD AUTO: 5.3 % — SIGNIFICANT CHANGE UP (ref 2–14)
MYELOCYTES NFR BLD: 0.9 % — HIGH (ref 0–0)
NEUTROPHILS # BLD AUTO: 1.8 K/UL — SIGNIFICANT CHANGE UP (ref 1.8–7.4)
NEUTROPHILS NFR BLD AUTO: 61.4 % — SIGNIFICANT CHANGE UP (ref 43–77)
NRBC # BLD: 2 /100 — HIGH (ref 0–0)
PHOSPHATE SERPL-MCNC: 3.2 MG/DL — SIGNIFICANT CHANGE UP (ref 2.5–4.5)
PLAT MORPH BLD: NORMAL — SIGNIFICANT CHANGE UP
PLATELET # BLD AUTO: 164 K/UL — SIGNIFICANT CHANGE UP (ref 150–400)
POTASSIUM SERPL-MCNC: 3.6 MMOL/L — SIGNIFICANT CHANGE UP (ref 3.5–5.3)
POTASSIUM SERPL-SCNC: 3.6 MMOL/L — SIGNIFICANT CHANGE UP (ref 3.5–5.3)
PROT SERPL-MCNC: 7.2 G/DL — SIGNIFICANT CHANGE UP (ref 6–8.3)
RBC # BLD: 2.56 M/UL — LOW (ref 4.2–5.8)
RBC # FLD: 21.3 % — HIGH (ref 10.3–14.5)
RBC BLD AUTO: SIGNIFICANT CHANGE UP
SODIUM SERPL-SCNC: 137 MMOL/L — SIGNIFICANT CHANGE UP (ref 135–145)
SP GR UR STRIP: 1.01 — LOW (ref 1.01–1.02)
VARIANT LYMPHS # BLD: 0.9 % — SIGNIFICANT CHANGE UP (ref 0–6)
WBC # BLD: 2.93 K/UL — LOW (ref 3.8–10.5)
WBC # FLD AUTO: 2.93 K/UL — LOW (ref 3.8–10.5)

## 2023-06-03 PROCEDURE — 93010 ELECTROCARDIOGRAM REPORT: CPT

## 2023-06-03 PROCEDURE — 99291 CRITICAL CARE FIRST HOUR: CPT

## 2023-06-03 RX ORDER — FUROSEMIDE 40 MG
40 TABLET ORAL ONCE
Refills: 0 | Status: DISCONTINUED | OUTPATIENT
Start: 2023-06-03 | End: 2023-06-03

## 2023-06-03 RX ORDER — FUROSEMIDE 40 MG
40 TABLET ORAL ONCE
Refills: 0 | Status: COMPLETED | OUTPATIENT
Start: 2023-06-03 | End: 2023-06-03

## 2023-06-03 RX ADMIN — CHLORHEXIDINE GLUCONATE 1 APPLICATION(S): 213 SOLUTION TOPICAL at 09:07

## 2023-06-03 RX ADMIN — Medication 5 MILLILITER(S): at 23:31

## 2023-06-03 RX ADMIN — Medication 5 MILLILITER(S): at 12:28

## 2023-06-03 RX ADMIN — Medication 1 LOZENGE: at 00:47

## 2023-06-03 RX ADMIN — Medication 1 TABLET(S): at 18:34

## 2023-06-03 RX ADMIN — Medication 40 MILLIGRAM(S): at 03:33

## 2023-06-03 RX ADMIN — Medication 1150 MILLIGRAM(S): at 20:46

## 2023-06-03 RX ADMIN — Medication 1 TABLET(S): at 06:25

## 2023-06-03 RX ADMIN — FAMOTIDINE 20 MILLIGRAM(S): 10 INJECTION INTRAVENOUS at 18:34

## 2023-06-03 RX ADMIN — Medication 1 LOZENGE: at 18:32

## 2023-06-03 RX ADMIN — Medication 10 MILLILITER(S): at 00:48

## 2023-06-03 RX ADMIN — APREPITANT 80 MILLIGRAM(S): 80 CAPSULE ORAL at 12:30

## 2023-06-03 RX ADMIN — FAMOTIDINE 20 MILLIGRAM(S): 10 INJECTION INTRAVENOUS at 06:25

## 2023-06-03 RX ADMIN — FLUDARABINE PHOSPHATE 58 MILLIGRAM(S): 25 INJECTION, SOLUTION INTRAVENOUS at 17:22

## 2023-06-03 RX ADMIN — HEPARIN SODIUM 4.24 UNIT(S)/HR: 5000 INJECTION INTRAVENOUS; SUBCUTANEOUS at 05:35

## 2023-06-03 RX ADMIN — Medication 10 MILLIGRAM(S): at 15:09

## 2023-06-03 RX ADMIN — Medication 400 MILLIGRAM(S): at 13:52

## 2023-06-03 RX ADMIN — Medication 400 MILLIGRAM(S): at 06:25

## 2023-06-03 RX ADMIN — Medication 1 TABLET(S): at 12:32

## 2023-06-03 RX ADMIN — Medication 400 MILLIGRAM(S): at 21:04

## 2023-06-03 RX ADMIN — URSODIOL 300 MILLIGRAM(S): 250 TABLET, FILM COATED ORAL at 06:25

## 2023-06-03 RX ADMIN — SODIUM CHLORIDE 1000 MILLILITER(S): 9 INJECTION INTRAMUSCULAR; INTRAVENOUS; SUBCUTANEOUS at 23:31

## 2023-06-03 RX ADMIN — Medication 10 MILLILITER(S): at 23:31

## 2023-06-03 RX ADMIN — ONDANSETRON 8 MILLIGRAM(S): 8 TABLET, FILM COATED ORAL at 21:03

## 2023-06-03 RX ADMIN — Medication 5 MILLILITER(S): at 09:06

## 2023-06-03 RX ADMIN — Medication 1 LOZENGE: at 23:31

## 2023-06-03 RX ADMIN — Medication 1 LOZENGE: at 20:24

## 2023-06-03 RX ADMIN — URSODIOL 300 MILLIGRAM(S): 250 TABLET, FILM COATED ORAL at 18:34

## 2023-06-03 RX ADMIN — MESNA 1200 MILLIGRAM(S): 100 INJECTION, SOLUTION INTRAVENOUS at 20:24

## 2023-06-03 RX ADMIN — Medication 10 MILLIGRAM(S): at 09:34

## 2023-06-03 RX ADMIN — Medication 10 MILLILITER(S): at 20:24

## 2023-06-03 RX ADMIN — Medication 1 MILLIGRAM(S): at 12:32

## 2023-06-03 RX ADMIN — ONDANSETRON 8 MILLIGRAM(S): 8 TABLET, FILM COATED ORAL at 05:35

## 2023-06-03 RX ADMIN — SODIUM CHLORIDE 20 MILLILITER(S): 9 INJECTION INTRAMUSCULAR; INTRAVENOUS; SUBCUTANEOUS at 05:34

## 2023-06-03 RX ADMIN — Medication 10 MILLILITER(S): at 12:28

## 2023-06-03 RX ADMIN — ONDANSETRON 8 MILLIGRAM(S): 8 TABLET, FILM COATED ORAL at 13:51

## 2023-06-03 RX ADMIN — Medication 1 LOZENGE: at 12:29

## 2023-06-03 RX ADMIN — Medication 1 LOZENGE: at 09:07

## 2023-06-03 RX ADMIN — Medication 10 MILLILITER(S): at 18:31

## 2023-06-03 RX ADMIN — Medication 5 MILLILITER(S): at 18:32

## 2023-06-03 RX ADMIN — FLUCONAZOLE 400 MILLIGRAM(S): 150 TABLET ORAL at 12:32

## 2023-06-03 RX ADMIN — Medication 10 MILLILITER(S): at 09:07

## 2023-06-03 RX ADMIN — SODIUM CHLORIDE 1000 MILLILITER(S): 9 INJECTION INTRAMUSCULAR; INTRAVENOUS; SUBCUTANEOUS at 05:37

## 2023-06-03 RX ADMIN — Medication 5 MILLILITER(S): at 20:24

## 2023-06-03 RX ADMIN — Medication 5 MILLILITER(S): at 00:47

## 2023-06-03 RX ADMIN — Medication 40 MILLIGRAM(S): at 12:00

## 2023-06-03 NOTE — PROGRESS NOTE ADULT - CRITICAL CARE ATTENDING COMMENT
· Assessment    37 year old male with severe aplastic anemia with multiple lines of treatment admitted for a haplo-identical pbsct with high dose Flu / Cy / TBI prep regimen       Problem/Plan - 1:  ·  Problem: Stem cells transplant status.   ·  Plan: 1. Admit to BMTU   2. TLC placement in IR   3. Day -6 - day + 5 - antiemetics   4. Day - 6 start CTX hydration - 1/2NS @ 200ml /hr    5. Strict I&O, daily weights, prn diuresis   6. Day -6 - day -2 - Fludarabine 30mg/m2   7. Day -6 - day - 5 - CTX 14.5mg/kg/day. CTX to start after urine SG < 1.010. Mesna  12mg / kg - hemorrhagic cystitis ppx   8. Day -2 - IVIG 0.4 g/kg (ideal body weight) every 3 weeks. Premedication with Tylenol and benadryl  9. Day -1 -  cGy x 1 dose   10. Day - 1 - at 2200 begin transplant hydration : 0.45Ns + 1 amp (50mEq) sodium bicarbonate at 150ml /hr; continue transplant hydration for 24 hours post infusion   11. Day 0 – HPC transplant   12. Day + 2 at 2200 - begin CTX hydration (0.45NS + 10 mEq KCl/ @150ml /m2  continue 24 hours post last dose of CTX   13. Day + 3 - + 4 - CTX 50mg/kg.day. Begin CTX when urine SG < 1.010. EKG daily. Mesna for hemorraghic cystitis ppx.   14. Day + 5 - start Zarxio,  MMF and Tacrolimus. Check Tacrolimus levels on Monday and Thursday.   15. Anxiolytics, antinausea, antidiarrhea medications as needed   16. Lasix PRN while being aggressively hydrated to avoid VOD   17. Nutritional support, pain management.    Problem/Plan - 2:  ·  Problem: Need for prophylactic measure.   ·  Plan: 1. VOD prophylaxis - low dose heparin gtt (dosed at 100 units / kg / day), glutamine supplementation, Actigall BID   2. PCP prophylaxis - Bactrim DS through day -2    3. Antiviral prophylaxis - Continue Acyclovir   4. Antifungal prophylaxis- Diflucan 400 mg po daily.  5.. GI prophylaxis - Protonix po QD   6. Antibacterial prophylaxis - when ANC < 500, start Cipro 500mg po BID. If becomes febrile, pan cx, CXR and change Cipro to Cefepime 2g IV q 8 hours. Continue until count recovery  7. Aggressive mouth care and skin care as per protocol. 1. Admit to BMTU   2. TLC placement in IR   3. Day -6 - day + 5 - antiemetics   4. Day - 6 start CTX hydration - 1/2NS @ 200ml /hr    5. Strict I&O, daily weights, prn diuresis   6. Day -6 - day -2 - Fludarabine 30mg/m2 IV  7. Day -6 - day - 5 - CTX 14.5mg/kg/day IV. CTX to start after urine SG < 1.010. Mesna  12mg / kg - hemorrhagic cystitis ppx   8. Day -2 - IVIG 0.4 g/kg (ideal body weight) every 3 weeks. Premedication with Tylenol and benadryl  9. Day -1 -  cGy x 1 dose   10. Day - 1 - at 2200 begin transplant hydration : 0.45Ns + 1 amp (50mEq) sodium bicarbonate at 150ml /hr; continue transplant hydration for 24 hours post infusion   11. Day 0 – HPC transplant   12. Day + 2 at 2200 - begin CTX hydration (0.45NS + 10 mEq KCl/ @150ml /m2  continue 24 hours post last dose of CTX   13. Day + 3 - + 4 - CTX 50mg/kg/day IV. Begin CTX when urine SG < 1.010. EKG daily. Mesna for hemorraghic cystitis ppx.   14. Day + 5 - start Zarxio,  MMF and Tacrolimus. Check Tacrolimus levels on Monday and Thursday.   15. Anxiolytics, antinausea, antidiarrhea medications as needed   16. Lasix PRN while being aggressively hydrated to avoid VOD   17. Nutritional support, pain management  Need for prophylactic measures:  1. VOD prophylaxis - low dose heparin gtt (dosed at 100 units / kg / day), glutamine supplementation, Actigall BID   2. PCP prophylaxis - Bactrim DS through day -2    3. Antiviral prophylaxis - Continue Acyclovir   4. Antifungal prophylaxis- Diflucan 400 mg po daily.  5.. GI prophylaxis - Protonix po QD   6. Antibacterial prophylaxis - when ANC < 500, start Cipro 500mg po BID. If becomes febrile, pan cx, CXR and change Cipro to Cefepime 2g IV q 8 hours. Continue until count recovery  7. Aggressive mouth care and skin care as per protocol  8. GVHD prophylaxis- high dose CTX; MMF and Tacrolimus. 1. Admit to BMTU Today is day -5  2. TLC placement in IR   3. Day -6 - day + 5 - antiemetics   4. Day - 6 start CTX hydration - 1/2NS @ 200ml /hr    5. Strict I&O, daily weights, prn diuresis   6. Day -6 - day -2 - Fludarabine 30mg/m2 IV  7. Day -6 - day - 5 - CTX 14.5mg/kg/day IV. CTX to start after urine SG < 1.010. Mesna  12mg / kg - hemorrhagic cystitis ppx   8. Day -2 - IVIG 0.4 g/kg (ideal body weight) every 3 weeks. Premedication with Tylenol and benadryl  9. Day -1 -  cGy x 1 dose   10. Day - 1 - at 2200 begin transplant hydration : 0.45Ns + 1 amp (50mEq) sodium bicarbonate at 150ml /hr; continue transplant hydration for 24 hours post infusion   11. Day 0 – HPC transplant   12. Day + 2 at 2200 - begin CTX hydration (0.45NS + 10 mEq KCl/ @150ml /m2  continue 24 hours post last dose of CTX   13. Day + 3 - + 4 - CTX 50mg/kg/day IV. Begin CTX when urine SG < 1.010. EKG daily. Mesna for hemorraghic cystitis ppx.   14. Day + 5 - start Zarxio,  MMF and Tacrolimus. Check Tacrolimus levels on Monday and Thursday.   15. Anxiolytics, antinausea, antidiarrhea medications as needed   16. Lasix PRN while being aggressively hydrated to avoid VOD   17. Nutritional support, pain management  Need for prophylactic measures:  1. VOD prophylaxis - low dose heparin gtt (dosed at 100 units / kg / day), glutamine supplementation, Actigall BID   2. PCP prophylaxis - Bactrim DS through day -2    3. Antiviral prophylaxis - Continue Acyclovir   4. Antifungal prophylaxis- Diflucan 400 mg po daily.  5.. GI prophylaxis - Protonix po QD   6. Antibacterial prophylaxis - when ANC < 500, start Cipro 500mg po BID. If becomes febrile, pan cx, CXR and change Cipro to Cefepime 2g IV q 8 hours. Continue until count recovery  7. Aggressive mouth care and skin care as per protocol  8. GVHD prophylaxis- high dose CTX; MMF and Tacrolimus.

## 2023-06-03 NOTE — ADVANCED PRACTICE NURSE CONSULT - ASSESSMENT
Cyclophosphamide 1150mg IV over 30 minutes was administered via right TLC. 2 RN identifiers completed. TLC was flushed with no resistance positive blood return noted. Mesna 1200mg IV was administered 15 minutes prior infusion. Urine specific gravity was 1.004. Patient tolerated chemotherapy with no adverse reactions.

## 2023-06-03 NOTE — PROGRESS NOTE ADULT - REASON FOR ADMISSION
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

## 2023-06-03 NOTE — PROGRESS NOTE ADULT - SUBJECTIVE AND OBJECTIVE BOX
[facility] Transplant Team                                                      Critical / Counseling Time Provided: 30 minutes                                                                                                                                                        Chief Complaint:     S: Patient seen and examined with [facility] Transplant Team:   Denies mouth / tongue / throat pain, dyspnea, cough, nausea, vomiting, diarrhea, abdominal pain     O: Vitals:   Vital Signs Last 24 Hrs  T(C): 38.2 (03 Jun 2023 05:18), Max: 38.2 (03 Jun 2023 05:18)  T(F): 100.8 (03 Jun 2023 05:18), Max: 100.8 (03 Jun 2023 05:18)  HR: 100 (03 Jun 2023 05:18) (79 - 100)  BP: 119/77 (03 Jun 2023 05:18) (112/67 - 148/91)  BP(mean): --  RR: 18 (03 Jun 2023 05:18) (18 - 18)  SpO2: 98% (03 Jun 2023 05:18) (97% - 99%)    Parameters below as of 03 Jun 2023 05:18  Patient On (Oxygen Delivery Method): room air        Admit weight:   Daily Height in cm: 174 (02 Jun 2023 09:29)    Daily     Intake / Output:   06-02 @ 07:01  -  06-03 @ 07:00  --------------------------------------------------------  IN: 4908.9 mL / OUT: 3790 mL / NET: 1118.9 mL          PE:   Oropharynx:   Oral Mucositis:                                                        Grade:   CVS:   Lungs:   Abdomen:  Extremities:   Gastric Mucositis:                                                  Grade:   Intestinal Mucositis:                                              Grade:   Skin:   TLC:   Neuro:   Pain:     Labs:   CBC Full  -  ( 02 Jun 2023 13:05 )  WBC Count : 3.75 K/uL  Hemoglobin : 9.5 g/dL  Hematocrit : 27.7 %  Platelet Count - Automated : 154 K/uL  Mean Cell Volume : 102.2 fl  Mean Cell Hemoglobin : 35.1 pg  Mean Cell Hemoglobin Concentration : 34.3 gm/dL  Auto Neutrophil # : 1.87 K/uL  Auto Lymphocyte # : 1.27 K/uL  Auto Monocyte # : 0.50 K/uL  Auto Eosinophil # : 0.06 K/uL  Auto Basophil # : 0.02 K/uL  Auto Neutrophil % : 49.9 %  Auto Lymphocyte % : 33.9 %  Auto Monocyte % : 13.3 %  Auto Eosinophil % : 1.6 %  Auto Basophil % : 0.5 %                          9.5    3.75  )-----------( 154      ( 02 Jun 2023 13:05 )             27.7     06-02    139  |  104  |  16  ----------------------------<  97  3.8   |  22  |  0.84    Ca    9.4      02 Jun 2023 13:05  Phos  3.1     06-02  Mg     2.2     06-02    TPro  7.5  /  Alb  4.8  /  TBili  2.2<H>  /  DBili  0.3  /  AST  28  /  ALT  59<H>  /  AlkPhos  94  06-02    PT/INR - ( 02 Jun 2023 13:05 )   PT: 12.4 sec;   INR: 1.08 ratio         PTT - ( 02 Jun 2023 13:05 )  PTT:26.5 sec  LIVER FUNCTIONS - ( 02 Jun 2023 13:05 )  Alb: 4.8 g/dL / Pro: 7.5 g/dL / ALK PHOS: 94 U/L / ALT: 59 U/L / AST: 28 U/L / GGT: x           Lactate Dehydrogenase, Serum: 271 U/L (06-02 @ 13:05)          Karnofsky / Lansky Scale:   GVHD:   Skin:   Liver:   Gut:   Overall Grade:       Cultures:         Radiology:       Meds:   Antimicrobials:   acyclovir   Oral Tab/Cap 400 milliGRAM(s) Oral every 8 hours  clotrimazole Lozenge 1 Lozenge Oral five times a day  fluconAZOLE   Tablet 400 milliGRAM(s) Oral daily  trimethoprim  160 mG/sulfamethoxazole 800 mG 1 Tablet(s) Oral every 12 hours      Heme / Onc:   cyclophosphamide IVPB (eMAR) 1150 milliGRAM(s) IV Intermittent daily  fludarabine IVPB (eMAR) 58 milliGRAM(s) IV Intermittent every 24 hours  heparin  Infusion 424 Unit(s)/Hr IV Continuous <Continuous>  mesna IVPB (eMAR) 1200 milliGRAM(s) IV Intermittent once      GI:  famotidine    Tablet 20 milliGRAM(s) Oral two times a day  senna 1 Tablet(s) Oral at bedtime PRN  sodium bicarbonate Mouth Rinse 10 milliLiter(s) Swish and Spit five times a day  ursodiol Capsule 300 milliGRAM(s) Oral two times a day      Cardiovascular:   furosemide   Injectable 40 milliGRAM(s) IV Push once      Immunologic:       Other medications:   acetaminophen     Tablet .. 650 milliGRAM(s) Oral every 6 hours  aprepitant (Chemo) 80 milliGRAM(s) Oral every 24 hours  Biotene Dry Mouth Oral Rinse 5 milliLiter(s) Swish and Spit five times a day  Chemotherapy Worklist Order      chlorhexidine 4% Liquid 1 Application(s) Topical <User Schedule>  folic acid 1 milliGRAM(s) Oral daily  Infuse HPC Product      multivitamin 1 Tablet(s) Oral daily  ondansetron Injectable (Chemo) 8 milliGRAM(s) IV Push every 8 hours  sodium chloride 0.9%. 1000 milliLiter(s) IV Continuous <Continuous>  sodium chloride 0.9%. (Chemo) 1000 milliLiter(s) IV Continuous <Continuous>      PRN:   acetaminophen     Tablet .. 650 milliGRAM(s) Oral every 6 hours PRN  LORazepam   Injectable 1 milliGRAM(s) IV Push every 6 hours PRN  metoclopramide Injectable 10 milliGRAM(s) IV Push every 6 hours PRN  senna 1 Tablet(s) Oral at bedtime PRN  sodium chloride 0.9% lock flush 10 milliLiter(s) IV Push every 1 hour PRN      A/P:      · Assessment    37 year old male with severe aplastic anemia with multiple lines of treatment admitted for a haplo-identical pbsct with high dose Flu / Cy / TBI prep regimen       Problem/Plan - 1:  ·  Problem: Stem cells transplant status.   ·  Plan: 1. Admit to BMTU   2. TLC placement in IR   3. Day -6 - day + 5 - antiemetics   4. Day - 6 start CTX hydration - 1/2NS @ 200ml /hr    5. Strict I&O, daily weights, prn diuresis   6. Day -6 - day -2 - Fludarabine 30mg/m2   7. Day -6 - day - 5 - CTX 14.5mg/kg/day. CTX to start after urine SG < 1.010. Mesna  12mg / kg - hemorrhagic cystitis ppx   8. Day -2 - IVIG 0.4 g/kg (ideal body weight) every 3 weeks. Premedication with Tylenol and benadryl  9. Day -1 -  cGy x 1 dose   10. Day - 1 - at 2200 begin transplant hydration : 0.45Ns + 1 amp (50mEq) sodium bicarbonate at 150ml /hr; continue transplant hydration for 24 hours post infusion   11. Day 0 – HPC transplant   12. Day + 2 at 2200 - begin CTX hydration (0.45NS + 10 mEq KCl/ @150ml /m2  continue 24 hours post last dose of CTX   13. Day + 3 - + 4 - CTX 50mg/kg.day. Begin CTX when urine SG < 1.010. EKG daily. Mesna for hemorraghic cystitis ppx.   14. Day + 5 - start Zarxio,  MMF and Tacrolimus. Check Tacrolimus levels on Monday and Thursday.   15. Anxiolytics, antinausea, antidiarrhea medications as needed   16. Lasix PRN while being aggressively hydrated to avoid VOD   17. Nutritional support, pain management.    Problem/Plan - 2:  ·  Problem: Need for prophylactic measure.   ·  Plan: 1. VOD prophylaxis - low dose heparin gtt (dosed at 100 units / kg / day), glutamine supplementation, Actigall BID   2. PCP prophylaxis - Bactrim DS through day -2    3. Antiviral prophylaxis - Continue Acyclovir   4. Antifungal prophylaxis- Diflucan 400 mg po daily.  5.. GI prophylaxis - Protonix po QD   6. Antibacterial prophylaxis - when ANC < 500, start Cipro 500mg po BID. If becomes febrile, pan cx, CXR and change Cipro to Cefepime 2g IV q 8 hours. Continue until count recovery  7. Aggressive mouth care and skin care as per protocol.      1. Infectious Disease:   Fluconazole, Acyclovir     2. VOD Prophylaxis: Actigall, Glutamine, Heparin (dosed at 100 units / kg / day)     3. GI Prophylaxis:  Protonix    4. Mouthcare - NS / NaHCO3 rinses, Mycelex, Caphosol, skin care     5. GVHD prophylaxis     6. Transfuse & replete electrolytes prn     7. IV hydration, daily weights, strict I&O, prn diuresis     8. PO intake as tolerated, nutrition follow up as needed, MVI, folic acid     9. Antiemetics, anti-diarrhea medications:   Reglan, Ativan    10. OOB as tolerated, physical therapy consult if needed     11. Monitor coags / fibrinogen 2x week, vitamin K as needed     12. Monitor closely for clinical changes, monitor for fevers     13. Emotional support provided, plan of care discussed with patient and family, questions addressed     14. Patient education done regarding chemotherapy prep, plan of care, restrictions and discharge planning     15. Continue regular social work input     I have written the above note for Dr. Lima who performed service with me in the room.   Buck Sosa  NP-C ([phone])    I have seen and examined patient with NP, I agree with above note as scribed.                    {\rtf1\gtpjsl27584\ansi\gktvhpl2124\ftnbj\uc1\deff0  {\fonttbl{\f0 \fnil Segoe UI;}{\f1 \fnil \fcharset0 Segoe UI;}{\f2 \fnil Times New Dante;}}  {\colortbl ;\thw227\dzkoy811\assq007 ;\red0\green0\blue0 ;\red0\green0\nbwv527 ;\red0\green0\blue0 ;}  {\stylesheet{\f0\fs20 Normal;}{\cs1 Default Paragraph Font;}{\cs2\f0\fs16 Line Number;}{\cs3\f2\fs24\ul\cf3 Hyperlink;}}  {\*\revtbl{Unknown;}}  \lklzwy12226\ebcobz12476\qqfyg4017\eusbs7562\thflm0161\ffdgv7955\yvpnqzb290\rsysxjk399\nogrowautofit\dvhsiu451\formshade\nofeaturethrottle1\dntblnsbdb\fet4\aendnotes\aftnnrlc\pgbrdrhead\pgbrdrfoot  \sectd\nxbdbp92028\hupuup86584\guttersxn0\ojoxjuya0404\zdmbogcj1219\pwiejnwi3946\lhdzfcpm8745\nlttrwu674\ujqijyz833\sbkpage\pgncont\pgndec  \plain\plain\f0\fs24\pard\plain\f0\fs24\plain\f0\fs20\bfhe2475\hich\f0\dbch\f0\loch\f0\fs20 [facility] Transplant Team                                                      Critical / Counseling Time Provided: 30 minutes\par                                                                                                                                                      \par  Chief Complaint: \par  \par  S: Patient seen and examined with [facility] Transplant Team: \par  Denies mouth / tongue / throat pain, dyspnea, cough, nausea, vomiting, diarrhea, abdominal pain \par  \par  O: Vitals: \par  Vital Signs Last 24 Hrs\par  T(C): 38.2 (03 Jun 2023 05:18), Max: 38.2 (03 Jun 2023 05:18)\par  T(F): 100.8 (03 Jun 2023 05:18), Max: 100.8 (03 Jun 2023 05:18)\par  HR: 100 (03 Jun 2023 05:18) (79 - 100)\par  BP: 119/77 (03 Jun 2023 05:18) (112/67 - 148/91)\par  BP(mean): --\par  RR: 18 (03 Jun 2023 05:18) (18 - 18)\par  SpO2: 98% (03 Jun 2023 05:18) (97% - 99%)\par  \par  Parameters below as of 03 Jun 2023 05:18\par  Patient On (Oxygen Delivery Method): room air\par  \par  \par  \par  Admit weight: \par  Daily Height in cm: 174 (02 Jun 2023 09:29)  \par  Daily \par  \par  Intake / Output: \par  06-02 @ 07:01  -  06-03 @ 07:00\par  --------------------------------------------------------\par  IN: 4908.9 mL / OUT: 3790 mL / NET: 1118.9 mL\par  \par  \par  \par  \par  PE: \par  Oropharynx: \par  Oral Mucositis:                                                        Grade: \par  CVS: \par  Lungs: \par  Abdomen:\par  Extremities: \par  Gastric Mucositis:                                                  Grade: \par  Intestinal Mucositis:                                              Grade: \par  Skin: \par  TLC: \par  Neuro: \par  Pain: \par  \par  Labs: \par  CBC Full  -  ( 02 Jun 2023 13:05 )\par  WBC Count : 3.75 K/uL\par  Hemoglobin : 9.5 g/dL\par  Hematocrit : 27.7 %\par  Platelet Count - Automated : 154 K/uL\par  Mean Cell Volume : 102.2 fl\par  Mean Cell Hemoglobin : 35.1 pg\par  Mean Cell Hemoglobin Concentration : 34.3 gm/dL\par  Auto Neutrophil # : 1.87 K/uL\par  Auto Lymphocyte # : 1.27 K/uL\par  Auto Monocyte # : 0.50 K/uL\par  Auto Eosinophil # : 0.06 K/uL\par  Auto Basophil # : 0.02 K/uL\par  Auto Neutrophil % : 49.9 %\par  Auto Lymphocyte % : 33.9 %\par  Auto Monocyte % : 13.3 %\par  Auto Eosinophil % : 1.6 %\par  Auto Basophil % : 0.5 %\par  \par           \par             9.5  \par  3.75  )-----------( 154      ( 02 Jun 2023 13:05 )\par             27.7 \par  \par  06-02\par  \par  139  |  104  |  16\par  ----------------------------<  97\par  3.8   |  22  |  0.84\par  \par  Ca    9.4      02 Jun 2023 13:05\par  Phos  3.1     06-02\par  Mg     2.2     06-02\par  \par  TPro  7.5  /  Alb  4.8  /  TBili  2.2<H>  /  DBili  0.3  /  AST  28  /  ALT  59<H>  /  AlkPhos  94  06-02\par  \par  PT/INR - ( 02 Jun 2023 13:05 )   PT: 12.4 sec;   INR: 1.08 ratio  \par  \par   \par  PTT - ( 02 Jun 2023 13:05 )  PTT:26.5 sec\par  LIVER FUNCTIONS - ( 02 Jun 2023 13:05 )\par  Alb: 4.8 g/dL / Pro: 7.5 g/dL / ALK PHOS: 94 U/L / ALT: 59 U/L / AST: 28 U/L / GGT: x       \par  \par  Lactate Dehydrogenase, Serum: 271 U/L (06-02 @ 13:05)\par  \par  \par  \par  \par  Karnofsky / Lansky Scale: \par  GVHD: \par  Skin: \par  Liver: \par  Gut: \par  Overall Grade: \par  \par  \par  Cultures: \par  \par  \par  \par  Radiology: \par  \par  \par  Meds: \par  Antimicrobials: \par  acyclovir   Oral Tab/Cap 400 milliGRAM(s) Oral every 8 hours\par  clotrimazole Lozenge 1 Lozenge Oral five times a day\par  fluconAZOLE   Tablet 400 milliGRAM(s) Oral daily\par  trimethoprim  160 mG/sulfamethoxazole 800 mG 1 Tablet(s) Oral every 12 hours\par  \par  \par  Heme / Onc: \par  cyclophosphamide IVPB (eMAR) 1150 milliGRAM(s) IV Intermittent daily\par  fludarabine IVPB (eMAR) 58 milliGRAM(s) IV Intermittent every 24 hours\par  heparin  Infusion 424 Unit(s)/Hr IV Continuous <Continuous>\par  mesna IVPB (eMAR) 1200 milliGRAM(s) IV Intermittent once\par  \par  \par  GI:\par  famotidine    Tablet 20 milliGRAM(s) Oral two times a day\par  senna 1 Tablet(s) Oral at bedtime PRN\par  sodium bicarbonate Mouth Rinse 10 milliLiter(s) Swish and Spit five times a day\par  ursodiol Capsule 300 milliGRAM(s) Oral two times a day\par  \par  \par  Cardiovascular: \par  furosemide   Injectable 40 milliGRAM(s) IV Push once\par  \par  \par  Immunologic: \par  \par  \par  Other medications: \par  acetaminophen     Tablet .. 650 milliGRAM(s) Oral every 6 hours\par  aprepitant (Chemo) 80 milliGRAM(s) Oral every 24 hours\par  Biotene Dry Mouth Oral Rinse 5 milliLiter(s) Swish and Spit five times a day\par  Chemotherapy Worklist Order    \par  chlorhexidine 4% Liquid 1 Application(s) Topical <User Schedule>\par  folic acid 1 milliGRAM(s) Oral daily\par  Infuse HPC Product    \par  multivitamin 1 Tablet(s) Oral daily\par  ondansetron Injectable (Chemo) 8 milliGRAM(s) IV Push every 8 hours\par  sodium chloride 0.9%. 1000 milliLiter(s) IV Continuous <Continuous>\par  sodium chloride 0.9%. (Chemo) 1000 milliLiter(s) IV Continuous <Continuous>\par  \par  \par  PRN: \par  acetaminophen     Tablet .. 650 milliGRAM(s) Oral every 6 hours PRN\par  LORazepam   Injectable 1 milliGRAM(s) IV Push every 6 hours PRN\par  metoclopramide Injectable 10 milliGRAM(s) IV Push every 6 hours PRN\par  senna 1 Tablet(s) Oral at bedtime PRN\par  sodium chloride 0.9% lock flush 10 milliLiter(s) IV Push every 1 hour PRN\par  \par  \par  A/P:  37 year old male with severe aplastic anemia with multiple lines of treatment admitted for a haplo-identical bone marrow transplant with  Flu / Cy / TBI preparative regimen \par  \par  \ql\plain\f0\fs24\plain\f1\fs16\osbi5337\hich\f1\dbch\f1\loch\f1\cf2\fs16 \'b7  {\*\bkmkstart [id number]}{\*\bkmkend [id number]}Problem: {\*\bkmkstart [id number]}{\*\bkmkend [id number]}Stem cells transplant status. \par  \'b7  {\*\bkmkstart [id number]}{\*\bkmkend [id number]}Plan: {\*\bkmkstart [id number]}{\*\bkmkend [id number]}1. Admit to BMTU \par  2. TLC placement in IR \par  3. Day -6 - day + 5 - antiemetics \par  4. Day - 6 start CTX hydration - 1/2NS @ 200ml /hr  \par  5. Strict I&O, daily weights, prn diuresis \par  6. Day -6 - day -2 - Fludarabine 30mg/m2 IV\par  7. Day -6 - day - 5 - CTX 14.5mg/kg/day IV. CTX to start after urine SG < 1.010. Mesna  12mg / kg - hemorrhagic cystitis ppx \par  8. Day -2 - IVIG 0.4 g/kg (ideal body weight) every 3 weeks. Premedication with Tylenol and benadryl\par  9. Day -1 -  cGy x 1 dose \par  10. Day - 1 - at 2200 begin transplant hydration : 0.45Ns + 1 amp (50mEq) sodium bicarbonate at 150ml /hr; continue transplant hydration for 24 hours post infusion \par  11. Day 0 \[id number] ? HPC transplant \par  12. Day + 2 at 2200 - begin CTX hydration (0.45NS + 10 mEq KCl/ @150ml /m2  continue 24 hours post last dose of CTX \par  13. Day + 3 - + 4 - CTX 50mg/kg/day IV. Begin CTX when urine SG < 1.010. EKG daily. Mesna for hemorraghic cystitis ppx. \par  14. Day + 5 - start Zarxio,  MMF and Tacrolimus. Check Tacrolimus levels on Monday and Thursday. \par  15. Anxiolytics, antinausea, antidiarrhea medications as needed \par  16. Lasix PRN while being aggressively hydrated to avoid VOD \par  17. Nutritional support, pain management.\plain\f1\fs16\igxr4624\hich\f1\dbch\f1\loch\f1\cf2\fs16\strike\plain\f1\fs16\ziwj7408\hich\f1\dbch\f1\loch\f1\cf2\fs16\par  \par  \plain\f1\fs16\swum6789\hich\f1\dbch\f1\loch\f1\cf2\fs16\b\ul{\field{\*\fldinst HYPERLINK [id number] }{\fldrslt Problem/Plan - 2:}}\plain\f1\fs16\hovg0022\hich\f1\dbch\f1\loch\f1\cf2\fs16\ql\par  \'b7  {\*\bkmkstart [id number]}{\*\bkmkend [id number]}Problem: {\*\bkmkstart [id number]}{\*\bkmkend [id number]}Need for prophylactic measure. \par  \'b7  {\*\bkmkstart [id number]}{\*\bkmkend [id number]}Plan: {\*\bkmkstart [id number]}{\*\bkmkend [id number]}1. VOD prophylaxis - low dose heparin gtt (dosed at 100 units / kg / day), glutamine supplementation, Actigall BID \par  2. PCP prophylaxis - Bactrim DS through day -2  \par  3. Antiviral prophylaxis - Continue Acyclovir \par  4. Antifungal prophylaxis- Diflucan 400 mg po daily.\par  5.. GI prophylaxis - Protonix po QD \par  6. Antibacterial prophylaxis - when ANC < 500, start Cipro 500mg po BID. If becomes febrile, pan cx, CXR and change Cipro to Cefepime 2g IV q 8 hours. Continue until count recovery\par  7. Aggressive mouth care and skin care as per protocol\par  8. GVHD prophylaxis- high dose CTX; MMF and Tacrolimus.\plain\f1\fs16\qvdz6692\hich\f1\dbch\f1\loch\f1\cf2\fs16\strike\plain\f1\fs16\xbvz8130\hich\f1\dbch\f1\loch\f1\cf2\fs16\par  \pard\plain\f0\fs24\plain\f0\fs20\lrlz1287\hich\f0\dbch\f0\loch\f0\fs20\par  \par  \par  \par  1. Infectious Disease: \par  Fluconazole, Acyclovir \par  \par  2. VOD Prophylaxis: Actigall, Glutamine, Heparin (dosed at 100 units / kg / day) \par  \par  3. GI Prophylaxis:  Protonix\par  \par  4. Mouthcare - NS / NaHCO3 rinses, Mycelex, Caphosol, skin care \par  \par  5. GVHD prophylaxis \par  \par  6. Transfuse & replete electrolytes prn \par  \par  7. IV hydration, daily weights, strict I&O, prn diuresis \par  \par  8. PO intake as tolerated, nutrition follow up as needed, MVI, folic acid \par  \par  9. Antiemetics, anti-diarrhea medications: \par  Reglan, Ativan\par  \par  10. OOB as tolerated, physical therapy consult if needed \par  \par  11. Monitor coags / fibrinogen 2x week, vitamin K as needed \par  \par  12. Monitor closely for clinical changes, monitor for fevers \par  \par  13. Emotional support provided, plan of care discussed with patient and family, questions addressed \par  \par  14. Patient education done regarding chemotherapy prep, plan of care, restrictions and discharge planning \par  \par  15. Continue regular social work input \par  \par  I have written the above note for Dr. Lima who performed service with me in the room. \par  Buck JORDAN ([phone])\par  \par  I have seen and examined patient with NP, I agree with above note as scribed. \par  \par  \par  \par  \par  \par  \par  \par  \par  \ql\plain\f0\fs24\plain\f0\fs20\etkl0504\hich\f0\dbch\f0\loch\f0\fs20\par  }   [facility]                                                      Critical / Counseling Time Provided: 30 minutes                                                                                                                                                        Chief Complaint:     S: Patient seen and examined with hospitals Transplant Team:   + intermittent nausea    All other ROS negative.      O: Vitals:   Vital Signs Last 24 Hrs  T(C): 38.2 (03 Jun 2023 05:18), Max: 38.2 (03 Jun 2023 05:18)  T(F): 100.8 (03 Jun 2023 05:18), Max: 100.8 (03 Jun 2023 05:18)  HR: 100 (03 Jun 2023 05:18) (79 - 100)  BP: 119/77 (03 Jun 2023 05:18) (112/67 - 148/91)  BP(mean): --  RR: 18 (03 Jun 2023 05:18) (18 - 18)  SpO2: 98% (03 Jun 2023 05:18) (97% - 99%)    Parameters below as of 03 Jun 2023 05:18  Patient On (Oxygen Delivery Method): room air        Admit weight:   Daily Height in cm: 174 (02 Jun 2023 09:29)    Daily     Intake / Output:   06-02 @ 07:01  -  06-03 @ 07:00  --------------------------------------------------------  IN: 4908.9 mL / OUT: 3790 mL / NET: 1118.9 mL          PE:   Oropharynx:   Oral Mucositis:                                                        Grade:   CVS:   Lungs:   Abdomen:  Extremities:   Gastric Mucositis:                                                  Grade:   Intestinal Mucositis:                                              Grade:   Skin:   TLC:   Neuro:   Pain:     Labs:   CBC Full  -  ( 02 Jun 2023 13:05 )  WBC Count : 3.75 K/uL  Hemoglobin : 9.5 g/dL  Hematocrit : 27.7 %  Platelet Count - Automated : 154 K/uL  Mean Cell Volume : 102.2 fl  Mean Cell Hemoglobin : 35.1 pg  Mean Cell Hemoglobin Concentration : 34.3 gm/dL  Auto Neutrophil # : 1.87 K/uL  Auto Lymphocyte # : 1.27 K/uL  Auto Monocyte # : 0.50 K/uL  Auto Eosinophil # : 0.06 K/uL  Auto Basophil # : 0.02 K/uL  Auto Neutrophil % : 49.9 %  Auto Lymphocyte % : 33.9 %  Auto Monocyte % : 13.3 %  Auto Eosinophil % : 1.6 %  Auto Basophil % : 0.5 %                          9.5    3.75  )-----------( 154      ( 02 Jun 2023 13:05 )             27.7     06-02    139  |  104  |  16  ----------------------------<  97  3.8   |  22  |  0.84    Ca    9.4      02 Jun 2023 13:05  Phos  3.1     06-02  Mg     2.2     06-02    TPro  7.5  /  Alb  4.8  /  TBili  2.2<H>  /  DBili  0.3  /  AST  28  /  ALT  59<H>  /  AlkPhos  94  06-02    PT/INR - ( 02 Jun 2023 13:05 )   PT: 12.4 sec;   INR: 1.08 ratio         PTT - ( 02 Jun 2023 13:05 )  PTT:26.5 sec  LIVER FUNCTIONS - ( 02 Jun 2023 13:05 )  Alb: 4.8 g/dL / Pro: 7.5 g/dL / ALK PHOS: 94 U/L / ALT: 59 U/L / AST: 28 U/L / GGT: x           Lactate Dehydrogenase, Serum: 271 U/L (06-02 @ 13:05)          Karnofsky / Lansky Scale:   GVHD:   Skin:   Liver:   Gut:   Overall Grade:       Cultures:         Radiology:       Meds:   Antimicrobials:   acyclovir   Oral Tab/Cap 400 milliGRAM(s) Oral every 8 hours  clotrimazole Lozenge 1 Lozenge Oral five times a day  fluconAZOLE   Tablet 400 milliGRAM(s) Oral daily  trimethoprim  160 mG/sulfamethoxazole 800 mG 1 Tablet(s) Oral every 12 hours      Heme / Onc:   cyclophosphamide IVPB (eMAR) 1150 milliGRAM(s) IV Intermittent daily  fludarabine IVPB (eMAR) 58 milliGRAM(s) IV Intermittent every 24 hours  heparin  Infusion 424 Unit(s)/Hr IV Continuous <Continuous>  mesna IVPB (eMAR) 1200 milliGRAM(s) IV Intermittent once      GI:  famotidine    Tablet 20 milliGRAM(s) Oral two times a day  senna 1 Tablet(s) Oral at bedtime PRN  sodium bicarbonate Mouth Rinse 10 milliLiter(s) Swish and Spit five times a day  ursodiol Capsule 300 milliGRAM(s) Oral two times a day      Cardiovascular:   furosemide   Injectable 40 milliGRAM(s) IV Push once      Immunologic:       Other medications:   acetaminophen     Tablet .. 650 milliGRAM(s) Oral every 6 hours  aprepitant (Chemo) 80 milliGRAM(s) Oral every 24 hours  Biotene Dry Mouth Oral Rinse 5 milliLiter(s) Swish and Spit five times a day  Chemotherapy Worklist Order      chlorhexidine 4% Liquid 1 Application(s) Topical <User Schedule>  folic acid 1 milliGRAM(s) Oral daily  Infuse HPC Product      multivitamin 1 Tablet(s) Oral daily  ondansetron Injectable (Chemo) 8 milliGRAM(s) IV Push every 8 hours  sodium chloride 0.9%. 1000 milliLiter(s) IV Continuous <Continuous>  sodium chloride 0.9%. (Chemo) 1000 milliLiter(s) IV Continuous <Continuous>      PRN:   acetaminophen     Tablet .. 650 milliGRAM(s) Oral every 6 hours PRN  LORazepam   Injectable 1 milliGRAM(s) IV Push every 6 hours PRN  metoclopramide Injectable 10 milliGRAM(s) IV Push every 6 hours PRN  senna 1 Tablet(s) Oral at bedtime PRN  sodium chloride 0.9% lock flush 10 milliLiter(s) IV Push every 1 hour PRN      A/P:  37 year old male with severe aplastic anemia with multiple lines of treatment admitted for a haplo-identical bone marrow transplant with  Flu / Cy / TBI preparative regimen     ·  Problem: Stem cells transplant status.   ·  Plan: 1. Admit to BMTU   2. TLC placement in IR   3. Day -6 - day + 5 - antiemetics   4. Day - 6 start CTX hydration - 1/2NS @ 200ml /hr    5. Strict I&O, daily weights, prn diuresis   6. Day -6 - day -2 - Fludarabine 30mg/m2 IV  7. Day -6 - day - 5 - CTX 14.5mg/kg/day IV. CTX to start after urine SG < 1.010. Mesna  12mg / kg - hemorrhagic cystitis ppx   8. Day -2 - IVIG 0.4 g/kg (ideal body weight) every 3 weeks. Premedication with Tylenol and benadryl  9. Day -1 -  cGy x 1 dose   10. Day - 1 - at 2200 begin transplant hydration : 0.45Ns + 1 amp (50mEq) sodium bicarbonate at 150ml /hr; continue transplant hydration for 24 hours post infusion   11. Day 0 – [facility] transplant   12. Day + 2 at 2200 - begin CTX hydration (0.45NS + 10 mEq KCl/ @150ml /m2  continue 24 hours post last dose of CTX   13. Day + 3 - + 4 - CTX 50mg/kg/day IV. Begin CTX when urine SG < 1.010. EKG daily. Mesna for hemorraghic cystitis ppx.   14. Day + 5 - start Zarxio,  MMF and Tacrolimus. Check Tacrolimus levels on Monday and Thursday.   15. Anxiolytics, antinausea, antidiarrhea medications as needed   16. Lasix PRN while being aggressively hydrated to avoid VOD   17. Nutritional support, pain management.    Problem/Plan - 2:  ·  Problem: Need for prophylactic measure.   ·  Plan: 1. VOD prophylaxis - low dose heparin gtt (dosed at 100 units / kg / day), glutamine supplementation, Actigall BID   2. PCP prophylaxis - Bactrim DS through day -2    3. Antiviral prophylaxis - Continue Acyclovir   4. Antifungal prophylaxis- Diflucan 400 mg po daily.  5.. GI prophylaxis - Protonix po QD   6. Antibacterial prophylaxis - when ANC < 500, start Cipro 500mg po BID. If becomes febrile, pan cx, CXR and change Cipro to Cefepime 2g IV q 8 hours. Continue until count recovery  7. Aggressive mouth care and skin care as per protocol  8. GVHD prophylaxis- high dose CTX; MMF and Tacrolimus.          1. Infectious Disease:   Fluconazole, Acyclovir     2. VOD Prophylaxis: Actigall, Glutamine, Heparin (dosed at 100 units / kg / day)     3. GI Prophylaxis:  Protonix    4. Mouthcare - NS / NaHCO3 rinses, Mycelex, Caphosol, skin care     5. GVHD prophylaxis     6. Transfuse & replete electrolytes prn     7. IV hydration, daily weights, strict I&O, prn diuresis     8. PO intake as tolerated, nutrition follow up as needed, MVI, folic acid     9. Antiemetics, anti-diarrhea medications:   Reglan, Ativan    10. OOB as tolerated, physical therapy consult if needed     11. Monitor coags / fibrinogen 2x week, vitamin K as needed     12. Monitor closely for clinical changes, monitor for fevers     13. Emotional support provided, plan of care discussed with patient and family, questions addressed     14. Patient education done regarding chemotherapy prep, plan of care, restrictions and discharge planning     15. Continue regular social work input     I have written the above note for Dr. Lima who performed service with me in the room.   Buck Sosa  NP-C ([phone])    I have seen and examined patient with NP, I agree with above note as scribed.                    [facility] Transplant Team                                                      Critical / Counseling Time Provided: 30 minutes                                                                                                                                                        Chief Complaint:  aplastic anemia admitted for bone marrow transplant from his sister    S: Patient seen and examined with [facility] Transplant Team:   + intermittent nausea    All other ROS negative.    O: Vitals:   Vital Signs Last 24 Hrs  T(C): 38.2 (03 Jun 2023 05:18), Max: 38.2 (03 Jun 2023 05:18)  T(F): 100.8 (03 Jun 2023 05:18), Max: 100.8 (03 Jun 2023 05:18)  HR: 100 (03 Jun 2023 05:18) (79 - 100)  BP: 119/77 (03 Jun 2023 05:18) (112/67 - 148/91)  BP(mean): --  RR: 18 (03 Jun 2023 05:18) (18 - 18)  SpO2: 98% (03 Jun 2023 05:18) (97% - 99%)    Parameters below as of 03 Jun 2023 05:18  Patient On (Oxygen Delivery Method): room air    Admit weight: 102.8 kg  Daily weight: 102.3 kg    Intake / Output:   06-02 @ 07:01  -  06-03 @ 07:00  --------------------------------------------------------  IN: 4908.9 mL / OUT: 3790 mL / NET: 1118.9 mL      PE:   Oropharynx: Clear  Oral Mucositis: (-)                                                        Grade:   CVS: RRR, +S1,S2  Lungs: CTA B/L  Abdomen: Soft, NT, ND, +BS  Extremities: No edema in BLE  Gastric Mucositis: (-)                                                 Grade:   Intestinal Mucositis:   (-)                                           Grade:   Skin: Intact  TLC: CDI  Neuro: Alert, Ox3  Pain: Denies    Labs:                         9.3    2.93  )-----------( 164      ( 03 Jun 2023 07:17 )             26.4     Mean Cell Volume : 103.1 fl  Mean Cell Hemoglobin : 36.3 pg  Mean Cell Hemoglobin Concentration : 35.2 gm/dL  Auto Neutrophil # : 1.80 K/uL  Auto Lymphocyte # : 0.85 K/uL  Auto Monocyte # : 0.16 K/uL  Auto Eosinophil # : 0.08 K/uL  Auto Basophil # : 0.00 K/uL  Auto Neutrophil % : 61.4 %  Auto Lymphocyte % : 28.9 %  Auto Monocyte % : 5.3 %  Auto Eosinophil % : 2.6 %  Auto Basophil % : 0.0 %    06-03    137  |  101  |  13  ----------------------------<  93  3.6   |  23  |  0.97    Ca    9.0      03 Jun 2023 07:17  Phos  3.2     06-03  Mg     2.0     06-03    TPro  7.2  /  Alb  4.9  /  TBili  3.3<H>  /  DBili  0.3  /  AST  25  /  ALT  56<H>  /  AlkPhos  90  06-03    Mg 2.0  Phos 3.2  PT/INR - ( 02 Jun 2023 13:05 )   PT: 12.4 sec;   INR: 1.08 ratio    PTT - ( 02 Jun 2023 13:05 )  PTT:26.5 sec      Uric Acid --    Cultures:   NA    Radiology:   NA    Meds:   Antimicrobials:   acyclovir   Oral Tab/Cap 400 milliGRAM(s) Oral every 8 hours  clotrimazole Lozenge 1 Lozenge Oral five times a day  fluconAZOLE   Tablet 400 milliGRAM(s) Oral daily  trimethoprim  160 mG/sulfamethoxazole 800 mG 1 Tablet(s) Oral every 12 hours      Heme / Onc:   cyclophosphamide IVPB (eMAR) 1150 milliGRAM(s) IV Intermittent daily  fludarabine IVPB (eMAR) 58 milliGRAM(s) IV Intermittent every 24 hours  heparin  Infusion 424 Unit(s)/Hr IV Continuous <Continuous>  mesna IVPB (eMAR) 1200 milliGRAM(s) IV Intermittent once      GI:  famotidine    Tablet 20 milliGRAM(s) Oral two times a day  senna 1 Tablet(s) Oral at bedtime PRN  sodium bicarbonate Mouth Rinse 10 milliLiter(s) Swish and Spit five times a day  ursodiol Capsule 300 milliGRAM(s) Oral two times a day      Cardiovascular:   furosemide   Injectable 40 milliGRAM(s) IV Push once      Other medications:   acetaminophen     Tablet .. 650 milliGRAM(s) Oral every 6 hours  aprepitant (Chemo) 80 milliGRAM(s) Oral every 24 hours  Biotene Dry Mouth Oral Rinse 5 milliLiter(s) Swish and Spit five times a day  Chemotherapy Worklist Order      chlorhexidine 4% Liquid 1 Application(s) Topical <User Schedule>  folic acid 1 milliGRAM(s) Oral daily  Infuse [facility] Product      multivitamin 1 Tablet(s) Oral daily  ondansetron Injectable (Chemo) 8 milliGRAM(s) IV Push every 8 hours  sodium chloride 0.9%. 1000 milliLiter(s) IV Continuous <Continuous>  sodium chloride 0.9%. (Chemo) 1000 milliLiter(s) IV Continuous <Continuous>      PRN:   acetaminophen     Tablet .. 650 milliGRAM(s) Oral every 6 hours PRN  LORazepam   Injectable 1 milliGRAM(s) IV Push every 6 hours PRN  metoclopramide Injectable 10 milliGRAM(s) IV Push every 6 hours PRN  senna 1 Tablet(s) Oral at bedtime PRN  sodium chloride 0.9% lock flush 10 milliLiter(s) IV Push every 1 hour PRN    A/P:  37 year old male with severe aplastic anemia with multiple lines of treatment admitted for a haplo-identical bone marrow transplant with  Flu / Cy / TBI preparative regimen   Day - 6 start CTX hydration - 1/2NS @ 200ml /hr    Strict I&O, daily weights, prn diuresis   Day -6 - day -2 - Fludarabine 30mg/m2 IV  Day -6 - day - 5 - CTX 14.5mg/kg/day IV. CTX to start after urine SG < 1.010. Mesna  12mg / kg - hemorrhagic cystitis ppx   Day -2 - IVIG 0.4 g/kg (ideal body weight) every 3 weeks. Premedication with Tylenol and benadryl   Day -1 -  cGy x 1 dose   Day - 1 - at 2200 begin transplant hydration : 0.45Ns + 1 amp (50mEq) sodium bicarbonate at 150ml /hr; continue transplant hydration for 24 hours post infusion    Day + 2 at 2200 - begin CTX hydration (0.45NS + 10 mEq KCl/ @150ml /m2  continue 24 hours post last dose of CTX   Day + 3 - + 4 - CTX 50mg/kg/day IV. Begin CTX when urine SG < 1.010. EKG daily. Mesna for hemorraghic cystitis ppx.   Day + 5 - start Zarxio,  MMF and Tacrolimus. Check Tacrolimus levels on Monday and Thursday.   when ANC < 500, start Cipro 500mg po BID. If becomes febrile, pan cx, CXR and change Cipro to Cefepime 2g IV q 8 hours. Continue until count recovery    1. Infectious Disease:   acyclovir   Oral Tab/Cap 400 milliGRAM(s) Oral every 8 hours  clotrimazole Lozenge 1 Lozenge Oral five times a day  fluconAZOLE   Tablet 400 milliGRAM(s) Oral daily  trimethoprim  160 mG/sulfamethoxazole 800 mG 1 Tablet(s) Oral every 12 hours    2. VOD Prophylaxis: Actigall, Glutamine, Heparin (dosed at 100 units / kg / day)     3. GI Prophylaxis:  Protonix    4. Mouthcare - NS / NaHCO3 rinses, Mycelex, Biotene, skin care     5. GVHD prophylaxis    Day + 5 - start Zarxio,  MMF and Tacrolimus. Check Tacrolimus levels on Monday and Thursday.     6. Transfuse & replete electrolytes prn     7. IV hydration, daily weights, strict I&O, prn diuresis     8. PO intake as tolerated, nutrition follow up as needed, MVI, folic acid     9. Antiemetics, anti-diarrhea medications:   Reglan, Ativan    10. OOB as tolerated, physical therapy consult if needed     11. Monitor coags / fibrinogen 2x week, vitamin K as needed     12. Monitor closely for clinical changes, monitor for fevers     13. Emotional support provided, plan of care discussed with patient and family, questions addressed     14. Patient education done regarding chemotherapy prep, plan of care, restrictions and discharge planning     15. Continue regular social work input     I have written the above note for Dr. Goldberg  who performed service with me in the room.   Buck Sosa  NP-C ([phone])    I have seen and examined patient with NP, I agree with above note as scribed.

## 2023-06-04 LAB
ALBUMIN SERPL ELPH-MCNC: 4 G/DL — SIGNIFICANT CHANGE UP (ref 3.3–5)
ALP SERPL-CCNC: 78 U/L — SIGNIFICANT CHANGE UP (ref 40–120)
ALT FLD-CCNC: 41 U/L — SIGNIFICANT CHANGE UP (ref 10–45)
ANION GAP SERPL CALC-SCNC: 11 MMOL/L — SIGNIFICANT CHANGE UP (ref 5–17)
AST SERPL-CCNC: 20 U/L — SIGNIFICANT CHANGE UP (ref 10–40)
BASOPHILS # BLD AUTO: 0.01 K/UL — SIGNIFICANT CHANGE UP (ref 0–0.2)
BASOPHILS # BLD AUTO: 0.02 K/UL — SIGNIFICANT CHANGE UP (ref 0–0.2)
BASOPHILS NFR BLD AUTO: 0.5 % — SIGNIFICANT CHANGE UP (ref 0–2)
BASOPHILS NFR BLD AUTO: 0.9 % — SIGNIFICANT CHANGE UP (ref 0–2)
BILIRUB DIRECT SERPL-MCNC: 0.3 MG/DL — SIGNIFICANT CHANGE UP (ref 0–0.3)
BILIRUB SERPL-MCNC: 2.4 MG/DL — HIGH (ref 0.2–1.2)
BUN SERPL-MCNC: 11 MG/DL — SIGNIFICANT CHANGE UP (ref 7–23)
CALCIUM SERPL-MCNC: 8.5 MG/DL — SIGNIFICANT CHANGE UP (ref 8.4–10.5)
CHLORIDE SERPL-SCNC: 106 MMOL/L — SIGNIFICANT CHANGE UP (ref 96–108)
CO2 SERPL-SCNC: 21 MMOL/L — LOW (ref 22–31)
CREAT SERPL-MCNC: 0.86 MG/DL — SIGNIFICANT CHANGE UP (ref 0.5–1.3)
EGFR: 116 ML/MIN/1.73M2 — SIGNIFICANT CHANGE UP
EOSINOPHIL # BLD AUTO: 0.04 K/UL — SIGNIFICANT CHANGE UP (ref 0–0.5)
EOSINOPHIL # BLD AUTO: 0.08 K/UL — SIGNIFICANT CHANGE UP (ref 0–0.5)
EOSINOPHIL NFR BLD AUTO: 1.8 % — SIGNIFICANT CHANGE UP (ref 0–6)
EOSINOPHIL NFR BLD AUTO: 4.2 % — SIGNIFICANT CHANGE UP (ref 0–6)
GLUCOSE SERPL-MCNC: 92 MG/DL — SIGNIFICANT CHANGE UP (ref 70–99)
HCT VFR BLD CALC: 21.6 % — LOW (ref 39–50)
HCT VFR BLD CALC: 24.8 % — LOW (ref 39–50)
HGB BLD-MCNC: 7.3 G/DL — LOW (ref 13–17)
HGB BLD-MCNC: 8.4 G/DL — LOW (ref 13–17)
IMM GRANULOCYTES NFR BLD AUTO: 0.5 % — SIGNIFICANT CHANGE UP (ref 0–0.9)
LDH SERPL L TO P-CCNC: 230 U/L — SIGNIFICANT CHANGE UP (ref 50–242)
LYMPHOCYTES # BLD AUTO: 0.22 K/UL — LOW (ref 1–3.3)
LYMPHOCYTES # BLD AUTO: 0.25 K/UL — LOW (ref 1–3.3)
LYMPHOCYTES # BLD AUTO: 10.4 % — LOW (ref 13–44)
LYMPHOCYTES # BLD AUTO: 13.1 % — SIGNIFICANT CHANGE UP (ref 13–44)
MAGNESIUM SERPL-MCNC: 2.1 MG/DL — SIGNIFICANT CHANGE UP (ref 1.6–2.6)
MANUAL SMEAR VERIFICATION: SIGNIFICANT CHANGE UP
MCHC RBC-ENTMCNC: 33.8 GM/DL — SIGNIFICANT CHANGE UP (ref 32–36)
MCHC RBC-ENTMCNC: 33.9 GM/DL — SIGNIFICANT CHANGE UP (ref 32–36)
MCHC RBC-ENTMCNC: 34.9 PG — HIGH (ref 27–34)
MCHC RBC-ENTMCNC: 35 PG — HIGH (ref 27–34)
MCV RBC AUTO: 103.3 FL — HIGH (ref 80–100)
MCV RBC AUTO: 103.3 FL — HIGH (ref 80–100)
MONOCYTES # BLD AUTO: 0.17 K/UL — SIGNIFICANT CHANGE UP (ref 0–0.9)
MONOCYTES # BLD AUTO: 0.2 K/UL — SIGNIFICANT CHANGE UP (ref 0–0.9)
MONOCYTES NFR BLD AUTO: 10.5 % — SIGNIFICANT CHANGE UP (ref 2–14)
MONOCYTES NFR BLD AUTO: 7.8 % — SIGNIFICANT CHANGE UP (ref 2–14)
NEUTROPHILS # BLD AUTO: 1.36 K/UL — LOW (ref 1.8–7.4)
NEUTROPHILS # BLD AUTO: 1.68 K/UL — LOW (ref 1.8–7.4)
NEUTROPHILS NFR BLD AUTO: 71.2 % — SIGNIFICANT CHANGE UP (ref 43–77)
NEUTROPHILS NFR BLD AUTO: 79.1 % — HIGH (ref 43–77)
NRBC # BLD: 0 /100 WBCS — SIGNIFICANT CHANGE UP (ref 0–0)
NRBC # BLD: 2 /100 — HIGH (ref 0–0)
PHOSPHATE SERPL-MCNC: 2.6 MG/DL — SIGNIFICANT CHANGE UP (ref 2.5–4.5)
PLAT MORPH BLD: NORMAL — SIGNIFICANT CHANGE UP
PLATELET # BLD AUTO: 137 K/UL — LOW (ref 150–400)
PLATELET # BLD AUTO: 145 K/UL — LOW (ref 150–400)
POTASSIUM SERPL-MCNC: 3.9 MMOL/L — SIGNIFICANT CHANGE UP (ref 3.5–5.3)
POTASSIUM SERPL-SCNC: 3.9 MMOL/L — SIGNIFICANT CHANGE UP (ref 3.5–5.3)
PROT SERPL-MCNC: 6.1 G/DL — SIGNIFICANT CHANGE UP (ref 6–8.3)
RBC # BLD: 2.09 M/UL — LOW (ref 4.2–5.8)
RBC # BLD: 2.4 M/UL — LOW (ref 4.2–5.8)
RBC # FLD: 20.9 % — HIGH (ref 10.3–14.5)
RBC # FLD: 21 % — HIGH (ref 10.3–14.5)
RBC BLD AUTO: SIGNIFICANT CHANGE UP
SODIUM SERPL-SCNC: 138 MMOL/L — SIGNIFICANT CHANGE UP (ref 135–145)
WBC # BLD: 1.91 K/UL — LOW (ref 3.8–10.5)
WBC # BLD: 2.13 K/UL — LOW (ref 3.8–10.5)
WBC # FLD AUTO: 1.91 K/UL — LOW (ref 3.8–10.5)
WBC # FLD AUTO: 2.13 K/UL — LOW (ref 3.8–10.5)

## 2023-06-04 PROCEDURE — 99291 CRITICAL CARE FIRST HOUR: CPT

## 2023-06-04 RX ADMIN — Medication 400 MILLIGRAM(S): at 05:32

## 2023-06-04 RX ADMIN — Medication 1 TABLET(S): at 17:40

## 2023-06-04 RX ADMIN — Medication 10 MILLILITER(S): at 13:07

## 2023-06-04 RX ADMIN — Medication 1 TABLET(S): at 05:31

## 2023-06-04 RX ADMIN — Medication 1 LOZENGE: at 08:45

## 2023-06-04 RX ADMIN — Medication 5 MILLILITER(S): at 16:57

## 2023-06-04 RX ADMIN — Medication 10 MILLILITER(S): at 20:07

## 2023-06-04 RX ADMIN — Medication 1 LOZENGE: at 20:07

## 2023-06-04 RX ADMIN — ONDANSETRON 8 MILLIGRAM(S): 8 TABLET, FILM COATED ORAL at 05:31

## 2023-06-04 RX ADMIN — APREPITANT 80 MILLIGRAM(S): 80 CAPSULE ORAL at 13:12

## 2023-06-04 RX ADMIN — Medication 10 MILLILITER(S): at 16:57

## 2023-06-04 RX ADMIN — FLUCONAZOLE 400 MILLIGRAM(S): 150 TABLET ORAL at 13:09

## 2023-06-04 RX ADMIN — FAMOTIDINE 20 MILLIGRAM(S): 10 INJECTION INTRAVENOUS at 05:31

## 2023-06-04 RX ADMIN — ONDANSETRON 8 MILLIGRAM(S): 8 TABLET, FILM COATED ORAL at 22:59

## 2023-06-04 RX ADMIN — Medication 1 MILLIGRAM(S): at 13:08

## 2023-06-04 RX ADMIN — Medication 5 MILLILITER(S): at 20:06

## 2023-06-04 RX ADMIN — URSODIOL 300 MILLIGRAM(S): 250 TABLET, FILM COATED ORAL at 17:40

## 2023-06-04 RX ADMIN — Medication 1 TABLET(S): at 13:08

## 2023-06-04 RX ADMIN — Medication 10 MILLIGRAM(S): at 14:11

## 2023-06-04 RX ADMIN — URSODIOL 300 MILLIGRAM(S): 250 TABLET, FILM COATED ORAL at 05:32

## 2023-06-04 RX ADMIN — Medication 5 MILLILITER(S): at 08:45

## 2023-06-04 RX ADMIN — Medication 1 LOZENGE: at 16:59

## 2023-06-04 RX ADMIN — ONDANSETRON 8 MILLIGRAM(S): 8 TABLET, FILM COATED ORAL at 13:11

## 2023-06-04 RX ADMIN — FLUDARABINE PHOSPHATE 58 MILLIGRAM(S): 25 INJECTION, SOLUTION INTRAVENOUS at 17:03

## 2023-06-04 RX ADMIN — Medication 40 MILLIGRAM(S): at 08:46

## 2023-06-04 RX ADMIN — Medication 400 MILLIGRAM(S): at 13:11

## 2023-06-04 RX ADMIN — Medication 10 MILLIGRAM(S): at 20:06

## 2023-06-04 RX ADMIN — CHLORHEXIDINE GLUCONATE 1 APPLICATION(S): 213 SOLUTION TOPICAL at 08:46

## 2023-06-04 RX ADMIN — Medication 5 MILLILITER(S): at 13:07

## 2023-06-04 RX ADMIN — Medication 400 MILLIGRAM(S): at 21:11

## 2023-06-04 RX ADMIN — FAMOTIDINE 20 MILLIGRAM(S): 10 INJECTION INTRAVENOUS at 17:40

## 2023-06-04 RX ADMIN — Medication 10 MILLILITER(S): at 08:45

## 2023-06-04 RX ADMIN — Medication 1 LOZENGE: at 13:08

## 2023-06-04 RX ADMIN — Medication 10 MILLIGRAM(S): at 08:13

## 2023-06-04 NOTE — PROGRESS NOTE ADULT - CRITICAL CARE ATTENDING COMMENT
1. Admit to BMTU Today is day -5  2. TLC placement in IR   3. Day -6 - day + 5 - antiemetics   4. Day - 6 start CTX hydration - 1/2NS @ 200ml /hr    5. Strict I&O, daily weights, prn diuresis   6. Day -6 - day -2 - Fludarabine 30mg/m2 IV  7. Day -6 - day - 5 - CTX 14.5mg/kg/day IV. CTX to start after urine SG < 1.010. Mesna  12mg / kg - hemorrhagic cystitis ppx   8. Day -2 - IVIG 0.4 g/kg (ideal body weight) every 3 weeks. Premedication with Tylenol and benadryl  9. Day -1 -  cGy x 1 dose   10. Day - 1 - at 2200 begin transplant hydration : 0.45Ns + 1 amp (50mEq) sodium bicarbonate at 150ml /hr; continue transplant hydration for 24 hours post infusion   11. Day 0 – [facility] transplant   12. Day + 2 at 2200 - begin CTX hydration (0.45NS + 10 mEq KCl/ @150ml /m2  continue 24 hours post last dose of CTX   13. Day + 3 - + 4 - CTX 50mg/kg/day IV. Begin CTX when urine SG < 1.010. EKG daily. Mesna for hemorraghic cystitis ppx.   14. Day + 5 - start Zarxio,  MMF and Tacrolimus. Check Tacrolimus levels on Monday and Thursday.   15. Anxiolytics, antinausea, antidiarrhea medications as needed   16. Lasix PRN while being aggressively hydrated to avoid VOD   17. Nutritional support, pain management  Need for prophylactic measures:  1. VOD prophylaxis - low dose heparin gtt (dosed at 100 units / kg / day), glutamine supplementation, Actigall BID   2. PCP prophylaxis - Bactrim DS through day -2    3. Antiviral prophylaxis - Continue Acyclovir   4. Antifungal prophylaxis- Diflucan 400 mg po daily.  5.. GI prophylaxis - Protonix po QD   6. Antibacterial prophylaxis - when ANC < 500, start Cipro 500mg po BID. If becomes febrile, pan cx, CXR and change Cipro to Cefepime 2g IV q 8 hours. Continue until count recovery  7. Aggressive mouth care and skin care as per protocol  8. GVHD prophylaxis- high dose CTX; MMF and Tacrolimus. 1. Admit to BMTU Today is day -4  2. TLC placement in IR   3. Day -6 - day + 5 - antiemetics   4. Day - 6 start CTX hydration - 1/2NS @ 200ml /hr    5. Strict I&O, daily weights, prn diuresis   6. Day -6 - day -2 - Fludarabine 30mg/m2 IV  7. Day -6 - day - 5 - CTX 14.5mg/kg/day IV. CTX to start after urine SG < 1.010. Mesna  12mg / kg - hemorrhagic cystitis ppx   8. Day -2 - IVIG 0.4 g/kg (ideal body weight) every 3 weeks. Premedication with Tylenol and benadryl  9. Day -1 -  cGy x 1 dose   10. Day - 1 - at 2200 begin transplant hydration : 0.45Ns + 1 amp (50mEq) sodium bicarbonate at 150ml /hr; continue transplant hydration for 24 hours post infusion   11. Day 0 – [facility] transplant   12. Day + 2 at 2200 - begin CTX hydration (0.45NS + 10 mEq KCl/ @150ml /m2  continue 24 hours post last dose of CTX   13. Day + 3 - + 4 - CTX 50mg/kg/day IV. Begin CTX when urine SG < 1.010. EKG daily. Mesna for hemorraghic cystitis ppx.   14. Day + 5 - start Zarxio,  MMF and Tacrolimus. Check Tacrolimus levels on Monday and Thursday.   15. Anxiolytics, antinausea, antidiarrhea medications as needed   16. Lasix PRN while being aggressively hydrated to avoid VOD   17. Nutritional support, pain management  Need for prophylactic measures:  1. VOD prophylaxis - low dose heparin gtt (dosed at 100 units / kg / day), glutamine supplementation, Actigall BID   2. PCP prophylaxis - Bactrim DS through day -2    3. Antiviral prophylaxis - Continue Acyclovir   4. Antifungal prophylaxis- Diflucan 400 mg po daily.  5.. GI prophylaxis - Protonix po QD   6. Antibacterial prophylaxis - when ANC < 500, start Cipro 500mg po BID. If becomes febrile, pan cx, CXR and change Cipro to Cefepime 2g IV q 8 hours. Continue until count recovery  7. Aggressive mouth care and skin care as per protocol  8. GVHD prophylaxis- high dose CTX; MMF and Tacrolimus.    Patient with hemoglobin drop of ~2  on 6/4, likely due to chemotherapy however will repeat CBC tonight to establish stability, likely will require transfusion.

## 2023-06-04 NOTE — PROGRESS NOTE ADULT - SUBJECTIVE AND OBJECTIVE BOX
[facility] Transplant Team                                                      Critical / Counseling Time Provided: 30 minutes                                                                                                                                                          Chief Complaint:  aplastic anemia admitted for bone marrow transplant from his sister    S: Patient seen and examined with [facility]PC Transplant Team:   + intermittent nausea    All other ROS negative.       O: Vitals:   Vital Signs Last 24 Hrs  T(C): 36.7 (04 Jun 2023 05:15), Max: 36.7 (04 Jun 2023 05:15)  T(F): 98.1 (04 Jun 2023 05:15), Max: 98.1 (04 Jun 2023 05:15)  HR: 89 (04 Jun 2023 05:15) (64 - 89)  BP: 103/67 (04 Jun 2023 05:15) (103/67 - 128/84)  BP(mean): --  RR: 18 (04 Jun 2023 05:15) (18 - 18)  SpO2: 97% (04 Jun 2023 05:15) (97% - 100%)    Parameters below as of 04 Jun 2023 05:15  Patient On (Oxygen Delivery Method): room air    Admit weight: 102.8 kg  Daily weight: 102.3 kg      Intake / Output:   06-03 @ 07:01  -  06-04 @ 07:00  --------------------------------------------------------  IN: 6484 mL / OUT: 5850 mL / NET: 634 mL          PE:     Oropharynx: Clear  Oral Mucositis: (-)                                                        Grade:   CVS: RRR, +S1,S2  Lungs: CTA B/L  Abdomen: Soft, NT, ND, +BS  Extremities: No edema in BLE  Gastric Mucositis: (-)                                                 Grade:   Intestinal Mucositis:   (-)                                           Grade:   Skin: Intact  TLC: CDI  Neuro: Alert, Ox3  Pain: Denies        Labs:   CBC Full  -  ( 04 Jun 2023 06:38 )  WBC Count : 2.13 K/uL  Hemoglobin : 7.3 g/dL  Hematocrit : 21.6 %  Platelet Count - Automated : 137 K/uL  Mean Cell Volume : 103.3 fl  Mean Cell Hemoglobin : 34.9 pg  Mean Cell Hemoglobin Concentration : 33.8 gm/dL  Auto Neutrophil # : x  Auto Lymphocyte # : x  Auto Monocyte # : x  Auto Eosinophil # : x  Auto Basophil # : x  Auto Neutrophil % : x  Auto Lymphocyte % : x  Auto Monocyte % : x  Auto Eosinophil % : x  Auto Basophil % : x                          7.3    2.13  )-----------( 137      ( 04 Jun 2023 06:38 )             21.6     06-04    138  |  106  |  11  ----------------------------<  92  3.9   |  21<L>  |  0.86    Ca    8.5      04 Jun 2023 06:37  Phos  2.6     06-04  Mg     2.1     06-04    TPro  6.1  /  Alb  4.0  /  TBili  2.4<H>  /  DBili  x   /  AST  20  /  ALT  41  /  AlkPhos  78  06-04    PT/INR - ( 02 Jun 2023 13:05 )   PT: 12.4 sec;   INR: 1.08 ratio         PTT - ( 02 Jun 2023 13:05 )  PTT:26.5 sec  LIVER FUNCTIONS - ( 04 Jun 2023 06:37 )  Alb: 4.0 g/dL / Pro: 6.1 g/dL / ALK PHOS: 78 U/L / ALT: 41 U/L / AST: 20 U/L / GGT: x           Lactate Dehydrogenase, Serum: 230 U/L (06-04 @ 06:37)          Karnofsky / Lansky Scale:   GVHD:   Skin:   Liver:   Gut:   Overall Grade:       Cultures:         Radiology:       Meds:   Antimicrobials:   acyclovir   Oral Tab/Cap 400 milliGRAM(s) Oral every 8 hours  clotrimazole Lozenge 1 Lozenge Oral five times a day  fluconAZOLE   Tablet 400 milliGRAM(s) Oral daily  trimethoprim  160 mG/sulfamethoxazole 800 mG 1 Tablet(s) Oral every 12 hours      Heme / Onc:   fludarabine IVPB (eMAR) 58 milliGRAM(s) IV Intermittent every 24 hours  heparin  Infusion 424 Unit(s)/Hr IV Continuous <Continuous>      GI:  famotidine    Tablet 20 milliGRAM(s) Oral two times a day  senna 1 Tablet(s) Oral at bedtime PRN  sodium bicarbonate Mouth Rinse 10 milliLiter(s) Swish and Spit five times a day  ursodiol Capsule 300 milliGRAM(s) Oral two times a day      Cardiovascular:   furosemide   Injectable 40 milliGRAM(s) IV Push once      Immunologic:       Other medications:   acetaminophen     Tablet .. 650 milliGRAM(s) Oral every 6 hours  aprepitant (Chemo) 80 milliGRAM(s) Oral every 24 hours  Biotene Dry Mouth Oral Rinse 5 milliLiter(s) Swish and Spit five times a day  Chemotherapy Worklist Order      chlorhexidine 4% Liquid 1 Application(s) Topical <User Schedule>  folic acid 1 milliGRAM(s) Oral daily  Infuse HPC Product      multivitamin 1 Tablet(s) Oral daily  ondansetron Injectable (Chemo) 8 milliGRAM(s) IV Push every 8 hours  sodium chloride 0.9%. 1000 milliLiter(s) IV Continuous <Continuous>  sodium chloride 0.9%. (Chemo) 1000 milliLiter(s) IV Continuous <Continuous>      PRN:   acetaminophen     Tablet .. 650 milliGRAM(s) Oral every 6 hours PRN  LORazepam   Injectable 1 milliGRAM(s) IV Push every 6 hours PRN  metoclopramide Injectable 10 milliGRAM(s) IV Push every 6 hours PRN  senna 1 Tablet(s) Oral at bedtime PRN  sodium chloride 0.9% lock flush 10 milliLiter(s) IV Push every 1 hour PRN      A/P:       37 year old male with severe aplastic anemia with multiple lines of treatment admitted for a haplo-identical bone marrow transplant with  Flu / Cy / TBI preparative regimen   Day - 6 start CTX hydration - 1/2NS @ 200ml /hr    Strict I&O, daily weights, prn diuresis   Day -6 - day -2 - Fludarabine 30mg/m2 IV  Day -6 - day - 5 - CTX 14.5mg/kg/day IV. CTX to start after urine SG < 1.010. Mesna  12mg / kg - hemorrhagic cystitis ppx   Day -2 - IVIG 0.4 g/kg (ideal body weight) every 3 weeks. Premedication with Tylenol and benadryl   Day -1 -  cGy x 1 dose   Day - 1 - at 2200 begin transplant hydration : 0.45Ns + 1 amp (50mEq) sodium bicarbonate at 150ml /hr; continue transplant hydration for 24 hours post infusion    Day + 2 at 2200 - begin CTX hydration (0.45NS + 10 mEq KCl/ @150ml /m2  continue 24 hours post last dose of CTX   Day + 3 - + 4 - CTX 50mg/kg/day IV. Begin CTX when urine SG < 1.010. EKG daily. Mesna for hemorraghic cystitis ppx.   Day + 5 - start Zarxio,  MMF and Tacrolimus. Check Tacrolimus levels on Monday and Thursday.   when ANC < 500, start Cipro 500mg po BID. If becomes febrile, pan cx, CXR and change Cipro to Cefepime 2g IV q 8 hours. Continue until count recovery    1. Infectious Disease:   acyclovir   Oral Tab/Cap 400 milliGRAM(s) Oral every 8 hours  clotrimazole Lozenge 1 Lozenge Oral five times a day  fluconAZOLE   Tablet 400 milliGRAM(s) Oral daily  trimethoprim  160 mG/sulfamethoxazole 800 mG 1 Tablet(s) Oral every 12 hours    2. VOD Prophylaxis: Actigall, Glutamine, Heparin (dosed at 100 units / kg / day)     3. GI Prophylaxis:  Protonix    4. Mouthcare - NS / NaHCO3 rinses, Mycelex, Biotene, skin care     5. GVHD prophylaxis    Day + 5 - start Zarxio,  MMF and Tacrolimus. Check Tacrolimus levels on Monday and Thursday.     6. Transfuse & replete electrolytes prn     7. IV hydration, daily weights, strict I&O, prn diuresis     8. PO intake as tolerated, nutrition follow up as needed, MVI, folic acid     9. Antiemetics, anti-diarrhea medications:   Reglan, Ativan    10. OOB as tolerated, physical therapy consult if needed     11. Monitor coags / fibrinogen 2x week, vitamin K as needed     12. Monitor closely for clinical changes, monitor for fevers     13. Emotional support provided, plan of care discussed with patient and family, questions addressed     14. Patient education done regarding chemotherapy prep, plan of care, restrictions and discharge planning     15. Continue regular social work input         I have written the above note for Dr. Lima who performed service with me in the room.   Buck Sosa  NP-C ([phone])    I have seen and examined patient with NP, I agree with above note as scribed.                    [facility] Transplant Team                                                      Critical / Counseling Time Provided: 30 minutes                                                                                                                                                          Chief Complaint:  aplastic anemia admitted for bone marrow transplant from his sister    S: Patient seen and examined with [facility]PC Transplant Team:   + intermittent nausea    All other ROS negative.       O: Vitals:   Vital Signs Last 24 Hrs  T(C): 36.7 (04 Jun 2023 05:15), Max: 36.7 (04 Jun 2023 05:15)  T(F): 98.1 (04 Jun 2023 05:15), Max: 98.1 (04 Jun 2023 05:15)  HR: 89 (04 Jun 2023 05:15) (64 - 89)  BP: 103/67 (04 Jun 2023 05:15) (103/67 - 128/84)  BP(mean): --  RR: 18 (04 Jun 2023 05:15) (18 - 18)  SpO2: 97% (04 Jun 2023 05:15) (97% - 100%)    Parameters below as of 04 Jun 2023 05:15  Patient On (Oxygen Delivery Method): room air    Admit weight: 102.8 kg  Daily weight: 102.3 kg    Intake / Output:   06-03 @ 07:01  -  06-04 @ 07:00  --------------------------------------------------------  IN: 6484 mL / OUT: 5850 mL / NET: 634 mL    PE:   Oropharynx: Clear  Oral Mucositis: (-)                                                        Grade:   CVS: RRR, +S1,S2  Lungs: CTA B/L  Abdomen: Soft, NT, ND, +BS  Extremities: No edema in BLE  Gastric Mucositis: (-)                                                 Grade:   Intestinal Mucositis:   (-)                                           Grade:   Skin: Intact  TLC: CDI  Neuro: Alert, Ox3  Pain: Denies    Labs:   CBC Full  -  ( 04 Jun 2023 06:38 )  WBC Count : 2.13 K/uL  Hemoglobin : 7.3 g/dL  Hematocrit : 21.6 %  Platelet Count - Automated : 137 K/uL  Mean Cell Volume : 103.3 fl  Mean Cell Hemoglobin : 34.9 pg  Mean Cell Hemoglobin Concentration : 33.8 gm/dL  Auto Neutrophil # : x  Auto Lymphocyte # : x  Auto Monocyte # : x  Auto Eosinophil # : x  Auto Basophil # : x  Auto Neutrophil % : x  Auto Lymphocyte % : x  Auto Monocyte % : x  Auto Eosinophil % : x  Auto Basophil % : x                          7.3    2.13  )-----------( 137      ( 04 Jun 2023 06:38 )             21.6     06-04    138  |  106  |  11  ----------------------------<  92  3.9   |  21<L>  |  0.86    Ca    8.5      04 Jun 2023 06:37  Phos  2.6     06-04  Mg     2.1     06-04    TPro  6.1  /  Alb  4.0  /  TBili  2.4<H>  /  DBili  x   /  AST  20  /  ALT  41  /  AlkPhos  78  06-04    PT/INR - ( 02 Jun 2023 13:05 )   PT: 12.4 sec;   INR: 1.08 ratio      PTT - ( 02 Jun 2023 13:05 )  PTT:26.5 sec  LIVER FUNCTIONS - ( 04 Jun 2023 06:37 )  Alb: 4.0 g/dL / Pro: 6.1 g/dL / ALK PHOS: 78 U/L / ALT: 41 U/L / AST: 20 U/L / GGT: x           Lactate Dehydrogenase, Serum: 230 U/L (06-04 @ 06:37)    Cultures:   NA    Radiology:   NA    Meds:   Antimicrobials:   acyclovir   Oral Tab/Cap 400 milliGRAM(s) Oral every 8 hours  clotrimazole Lozenge 1 Lozenge Oral five times a day  fluconAZOLE   Tablet 400 milliGRAM(s) Oral daily  trimethoprim  160 mG/sulfamethoxazole 800 mG 1 Tablet(s) Oral every 12 hours    Heme / Onc:   fludarabine IVPB (eMAR) 58 milliGRAM(s) IV Intermittent every 24 hours  heparin  Infusion 424 Unit(s)/Hr IV Continuous <Continuous>    GI:  famotidine    Tablet 20 milliGRAM(s) Oral two times a day  senna 1 Tablet(s) Oral at bedtime PRN  sodium bicarbonate Mouth Rinse 10 milliLiter(s) Swish and Spit five times a day  ursodiol Capsule 300 milliGRAM(s) Oral two times a day    Cardiovascular:   furosemide   Injectable 40 milliGRAM(s) IV Push once    Other medications:   acetaminophen     Tablet .. 650 milliGRAM(s) Oral every 6 hours  aprepitant (Chemo) 80 milliGRAM(s) Oral every 24 hours  Biotene Dry Mouth Oral Rinse 5 milliLiter(s) Swish and Spit five times a day  Chemotherapy Worklist Order      chlorhexidine 4% Liquid 1 Application(s) Topical <User Schedule>  folic acid 1 milliGRAM(s) Oral daily  Infuse HPC Product      multivitamin 1 Tablet(s) Oral daily  ondansetron Injectable (Chemo) 8 milliGRAM(s) IV Push every 8 hours  sodium chloride 0.9%. 1000 milliLiter(s) IV Continuous <Continuous>  sodium chloride 0.9%. (Chemo) 1000 milliLiter(s) IV Continuous <Continuous>    PRN:   acetaminophen     Tablet .. 650 milliGRAM(s) Oral every 6 hours PRN  LORazepam   Injectable 1 milliGRAM(s) IV Push every 6 hours PRN  metoclopramide Injectable 10 milliGRAM(s) IV Push every 6 hours PRN  senna 1 Tablet(s) Oral at bedtime PRN  sodium chloride 0.9% lock flush 10 milliLiter(s) IV Push every 1 hour PRN    A/P:  37 year old male with severe aplastic anemia with multiple lines of treatment admitted for a haplo-identical bone marrow transplant with  Flu / Cy / TBI preparative regimen   Day - 5  start CTX hydration - 1/2NS @ 200ml /hr    Strict I&O, daily weights, prn diuresis   Day -6 - day -2 - Fludarabine 30mg/m2 IV  Day -6 - day - 5 - CTX 14.5mg/kg/day IV. CTX to start after urine SG < 1.010. Mesna  12mg / kg - hemorrhagic cystitis ppx   Day -2 - IVIG 0.4 g/kg (ideal body weight) every 3 weeks. Premedication with Tylenol and benadryl   Day -1 -  cGy x 1 dose   Day - 1 - at 2200 begin transplant hydration : 0.45Ns + 1 amp (50mEq) sodium bicarbonate at 150ml /hr; continue transplant hydration for 24 hours post infusion    Day + 2 at 2200 - begin CTX hydration (0.45NS + 10 mEq KCl/ @150ml /m2  continue 24 hours post last dose of CTX   Day + 3 - + 4 - CTX 50mg/kg/day IV. Begin CTX when urine SG < 1.010. EKG daily. Mesna for hemorraghic cystitis ppx.   Day + 5 - start Zarxio,  MMF and Tacrolimus. Check Tacrolimus levels on Monday and Thursday.   when ANC < 500, start Cipro 500mg po BID. If becomes febrile, pan cx, CXR and change Cipro to Cefepime 2g IV q 8 hours. Continue until count recovery    1. Infectious Disease:   acyclovir   Oral Tab/Cap 400 milliGRAM(s) Oral every 8 hours  clotrimazole Lozenge 1 Lozenge Oral five times a day  fluconAZOLE   Tablet 400 milliGRAM(s) Oral daily  trimethoprim  160 mG/sulfamethoxazole 800 mG 1 Tablet(s) Oral every 12 hours    2. VOD Prophylaxis: Actigall, Glutamine, Heparin (dosed at 100 units / kg / day)     3. GI Prophylaxis:  Protonix    4. Mouthcare - NS / NaHCO3 rinses, Mycelex, Biotene, skin care     5. GVHD prophylaxis    Day + 5 - start Zarxio,  MMF and Tacrolimus. Check Tacrolimus levels on Monday and Thursday.     6. Transfuse & replete electrolytes prn     7. IV hydration, daily weights, strict I&O, prn diuresis     8. PO intake as tolerated, nutrition follow up as needed, MVI, folic acid     9. Antiemetics, anti-diarrhea medications:   Reglan, Ativan    10. OOB as tolerated, physical therapy consult if needed     11. Monitor coags / fibrinogen 2x week, vitamin K as needed     12. Monitor closely for clinical changes, monitor for fevers     13. Emotional support provided, plan of care discussed with patient and family, questions addressed     14. Patient education done regarding chemotherapy prep, plan of care, restrictions and discharge planning     15. Continue regular social work input     I have written the above note for Dr. Goldberg who performed service with me in the room.   Buck Sosa  NP-C ([phone])    I have seen and examined patient with NP, I agree with above note as scribed.

## 2023-06-04 NOTE — PROGRESS NOTE ADULT - REASON FOR ADMISSION
Haplo-identical bone marrow transplant from his sister (6/12) with Flu / Cy / TBI prep regimen for treatment of severe aplastic anemia

## 2023-06-05 LAB
ALBUMIN SERPL ELPH-MCNC: 4.1 G/DL — SIGNIFICANT CHANGE UP (ref 3.3–5)
ALP SERPL-CCNC: 84 U/L — SIGNIFICANT CHANGE UP (ref 40–120)
ALT FLD-CCNC: 40 U/L — SIGNIFICANT CHANGE UP (ref 10–45)
ANION GAP SERPL CALC-SCNC: 10 MMOL/L — SIGNIFICANT CHANGE UP (ref 5–17)
ANISOCYTOSIS BLD QL: SLIGHT — SIGNIFICANT CHANGE UP
APTT BLD: 26.4 SEC — LOW (ref 27.5–35.5)
AST SERPL-CCNC: 23 U/L — SIGNIFICANT CHANGE UP (ref 10–40)
BASOPHILS # BLD AUTO: 0.01 K/UL — SIGNIFICANT CHANGE UP (ref 0–0.2)
BASOPHILS NFR BLD AUTO: 0.9 % — SIGNIFICANT CHANGE UP (ref 0–2)
BILIRUB SERPL-MCNC: 2 MG/DL — HIGH (ref 0.2–1.2)
BLD GP AB SCN SERPL QL: NEGATIVE — SIGNIFICANT CHANGE UP
BUN SERPL-MCNC: 11 MG/DL — SIGNIFICANT CHANGE UP (ref 7–23)
CALCIUM SERPL-MCNC: 8.6 MG/DL — SIGNIFICANT CHANGE UP (ref 8.4–10.5)
CHLORIDE SERPL-SCNC: 105 MMOL/L — SIGNIFICANT CHANGE UP (ref 96–108)
CO2 SERPL-SCNC: 21 MMOL/L — LOW (ref 22–31)
CREAT SERPL-MCNC: 0.93 MG/DL — SIGNIFICANT CHANGE UP (ref 0.5–1.3)
DACRYOCYTES BLD QL SMEAR: SLIGHT — SIGNIFICANT CHANGE UP
EGFR: 110 ML/MIN/1.73M2 — SIGNIFICANT CHANGE UP
EOSINOPHIL # BLD AUTO: 0.04 K/UL — SIGNIFICANT CHANGE UP (ref 0–0.5)
EOSINOPHIL NFR BLD AUTO: 2.7 % — SIGNIFICANT CHANGE UP (ref 0–6)
FIBRINOGEN PPP-MCNC: 341 MG/DL — SIGNIFICANT CHANGE UP (ref 200–445)
GLUCOSE SERPL-MCNC: 93 MG/DL — SIGNIFICANT CHANGE UP (ref 70–99)
HCT VFR BLD CALC: 21.9 % — LOW (ref 39–50)
HGB BLD-MCNC: 7.4 G/DL — LOW (ref 13–17)
INR BLD: 1.14 RATIO — SIGNIFICANT CHANGE UP (ref 0.88–1.16)
LDH SERPL L TO P-CCNC: 260 U/L — HIGH (ref 50–242)
LYMPHOCYTES # BLD AUTO: 0.06 K/UL — LOW (ref 1–3.3)
LYMPHOCYTES # BLD AUTO: 3.5 % — LOW (ref 13–44)
MAGNESIUM SERPL-MCNC: 2.3 MG/DL — SIGNIFICANT CHANGE UP (ref 1.6–2.6)
MANUAL SMEAR VERIFICATION: SIGNIFICANT CHANGE UP
MCHC RBC-ENTMCNC: 33.8 GM/DL — SIGNIFICANT CHANGE UP (ref 32–36)
MCHC RBC-ENTMCNC: 34.7 PG — HIGH (ref 27–34)
MCV RBC AUTO: 102.8 FL — HIGH (ref 80–100)
MONOCYTES # BLD AUTO: 0.17 K/UL — SIGNIFICANT CHANGE UP (ref 0–0.9)
MONOCYTES NFR BLD AUTO: 10.6 % — SIGNIFICANT CHANGE UP (ref 2–14)
NEUTROPHILS # BLD AUTO: 1.36 K/UL — LOW (ref 1.8–7.4)
NEUTROPHILS NFR BLD AUTO: 80.5 % — HIGH (ref 43–77)
NEUTS BAND # BLD: 1.8 % — SIGNIFICANT CHANGE UP (ref 0–8)
NRBC # BLD: 1 /100 — HIGH (ref 0–0)
OVALOCYTES BLD QL SMEAR: SLIGHT — SIGNIFICANT CHANGE UP
PHOSPHATE SERPL-MCNC: 2.6 MG/DL — SIGNIFICANT CHANGE UP (ref 2.5–4.5)
PLAT MORPH BLD: NORMAL — SIGNIFICANT CHANGE UP
PLATELET # BLD AUTO: 143 K/UL — LOW (ref 150–400)
POIKILOCYTOSIS BLD QL AUTO: SLIGHT — SIGNIFICANT CHANGE UP
POLYCHROMASIA BLD QL SMEAR: SLIGHT — SIGNIFICANT CHANGE UP
POTASSIUM SERPL-MCNC: 3.9 MMOL/L — SIGNIFICANT CHANGE UP (ref 3.5–5.3)
POTASSIUM SERPL-SCNC: 3.9 MMOL/L — SIGNIFICANT CHANGE UP (ref 3.5–5.3)
PROT SERPL-MCNC: 6.4 G/DL — SIGNIFICANT CHANGE UP (ref 6–8.3)
PROTHROM AB SERPL-ACNC: 13.2 SEC — SIGNIFICANT CHANGE UP (ref 10.5–13.4)
RBC # BLD: 2.13 M/UL — LOW (ref 4.2–5.8)
RBC # FLD: 20.6 % — HIGH (ref 10.3–14.5)
RBC BLD AUTO: ABNORMAL
RH IG SCN BLD-IMP: POSITIVE — SIGNIFICANT CHANGE UP
SODIUM SERPL-SCNC: 136 MMOL/L — SIGNIFICANT CHANGE UP (ref 135–145)
WBC # BLD: 1.65 K/UL — LOW (ref 3.8–10.5)
WBC # FLD AUTO: 1.65 K/UL — LOW (ref 3.8–10.5)

## 2023-06-05 PROCEDURE — ZZZZZ: CPT

## 2023-06-05 RX ORDER — OLANZAPINE 15 MG/1
5 TABLET, FILM COATED ORAL AT BEDTIME
Refills: 0 | Status: DISCONTINUED | OUTPATIENT
Start: 2023-06-05 | End: 2023-06-05

## 2023-06-05 RX ADMIN — FAMOTIDINE 20 MILLIGRAM(S): 10 INJECTION INTRAVENOUS at 05:49

## 2023-06-05 RX ADMIN — Medication 10 MILLILITER(S): at 17:07

## 2023-06-05 RX ADMIN — Medication 1 MILLIGRAM(S): at 11:52

## 2023-06-05 RX ADMIN — Medication 5 MILLILITER(S): at 20:05

## 2023-06-05 RX ADMIN — Medication 1 LOZENGE: at 11:53

## 2023-06-05 RX ADMIN — Medication 10 MILLILITER(S): at 11:53

## 2023-06-05 RX ADMIN — FLUCONAZOLE 400 MILLIGRAM(S): 150 TABLET ORAL at 11:52

## 2023-06-05 RX ADMIN — Medication 1 LOZENGE: at 17:07

## 2023-06-05 RX ADMIN — CHLORHEXIDINE GLUCONATE 1 APPLICATION(S): 213 SOLUTION TOPICAL at 08:00

## 2023-06-05 RX ADMIN — Medication 10 MILLILITER(S): at 00:13

## 2023-06-05 RX ADMIN — URSODIOL 300 MILLIGRAM(S): 250 TABLET, FILM COATED ORAL at 17:05

## 2023-06-05 RX ADMIN — Medication 10 MILLILITER(S): at 20:05

## 2023-06-05 RX ADMIN — FLUDARABINE PHOSPHATE 58 MILLIGRAM(S): 25 INJECTION, SOLUTION INTRAVENOUS at 16:51

## 2023-06-05 RX ADMIN — Medication 5 MILLILITER(S): at 17:06

## 2023-06-05 RX ADMIN — Medication 10 MILLIGRAM(S): at 14:01

## 2023-06-05 RX ADMIN — Medication 400 MILLIGRAM(S): at 21:45

## 2023-06-05 RX ADMIN — Medication 1 TABLET(S): at 11:52

## 2023-06-05 RX ADMIN — Medication 1 TABLET(S): at 05:50

## 2023-06-05 RX ADMIN — Medication 1 LOZENGE: at 00:14

## 2023-06-05 RX ADMIN — Medication 5 MILLILITER(S): at 08:00

## 2023-06-05 RX ADMIN — URSODIOL 300 MILLIGRAM(S): 250 TABLET, FILM COATED ORAL at 05:49

## 2023-06-05 RX ADMIN — Medication 10 MILLIGRAM(S): at 07:59

## 2023-06-05 RX ADMIN — Medication 1 LOZENGE: at 08:00

## 2023-06-05 RX ADMIN — Medication 1 TABLET(S): at 17:05

## 2023-06-05 RX ADMIN — ONDANSETRON 8 MILLIGRAM(S): 8 TABLET, FILM COATED ORAL at 21:45

## 2023-06-05 RX ADMIN — Medication 1 LOZENGE: at 20:05

## 2023-06-05 RX ADMIN — ONDANSETRON 8 MILLIGRAM(S): 8 TABLET, FILM COATED ORAL at 05:49

## 2023-06-05 RX ADMIN — Medication 5 MILLILITER(S): at 11:53

## 2023-06-05 RX ADMIN — HEPARIN SODIUM 4.24 UNIT(S)/HR: 5000 INJECTION INTRAVENOUS; SUBCUTANEOUS at 00:15

## 2023-06-05 RX ADMIN — Medication 5 MILLILITER(S): at 00:13

## 2023-06-05 RX ADMIN — FAMOTIDINE 20 MILLIGRAM(S): 10 INJECTION INTRAVENOUS at 17:05

## 2023-06-05 RX ADMIN — APREPITANT 80 MILLIGRAM(S): 80 CAPSULE ORAL at 11:52

## 2023-06-05 RX ADMIN — ONDANSETRON 8 MILLIGRAM(S): 8 TABLET, FILM COATED ORAL at 13:38

## 2023-06-05 RX ADMIN — Medication 400 MILLIGRAM(S): at 05:50

## 2023-06-05 RX ADMIN — Medication 10 MILLILITER(S): at 08:00

## 2023-06-05 RX ADMIN — Medication 400 MILLIGRAM(S): at 13:38

## 2023-06-05 NOTE — PROGRESS NOTE ADULT - SUBJECTIVE AND OBJECTIVE BOX
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 () with Flu / Cy / TBI prep regimen for treatment of severe aplastic anemia    S: Patient seen and examined with HPC Transplant Team:       O: Vitals:   Vital Signs Last 24 Hrs  T(C): 37.2 (2023 05:45), Max: 37.2 (2023 05:45)  T(F): 99 (2023 05:45), Max: 99 (2023 05:45)  HR: 90 (2023 05:45) (80 - 99)  BP: 100/62 (2023 05:45) (100/62 - 132/71)  BP(mean): --  RR: 18 (2023 05:45) (18 - 18)  SpO2: 97% (2023 05:45) (97% - 99%)    Parameters below as of 2023 05:45  Patient On (Oxygen Delivery Method): room air      Admit weight: 102.8kg   Daily Weight in k.8 (2023 10:24)  Today's weight:     Intake / Output:   06-04 @ 07:01  -  06-05 @ 07:00  --------------------------------------------------------  IN: 4373 mL / OUT: 5930 mL / NET: -1557 mL      PE:   Oropharynx: Clear  Oral Mucositis: (-)                                                        Grade:   CVS: RRR, +S1,S2  Lungs: CTA B/L  Abdomen: Soft, NT, ND, +BS  Extremities: No edema in BLE  Gastric Mucositis: (-)                                                 Grade:   Intestinal Mucositis:   (-)                                           Grade:   Skin: Intact  TLC: CDI  Neuro: Alert, Ox3  Pain: Denies    Labs:   CBC Full  -  ( 2023 07:13 )  WBC Count : 1.65 K/uL  Hemoglobin : 7.4 g/dL  Hematocrit : 21.9 %  Platelet Count - Automated : 143 K/uL  Mean Cell Volume : 102.8 fl  Mean Cell Hemoglobin : 34.7 pg  Mean Cell Hemoglobin Concentration : 33.8 gm/dL  Auto Neutrophil # : x  Auto Lymphocyte # : x  Auto Monocyte # : x  Auto Eosinophil # : x  Auto Basophil # : x  Auto Neutrophil % : x  Auto Lymphocyte % : x  Auto Monocyte % : x  Auto Eosinophil % : x  Auto Basophil % : x                          7.4    1.65  )-----------( 143      ( 2023 07:13 )             21.9       Meds:   Antimicrobials:   acyclovir   Oral Tab/Cap 400 milliGRAM(s) Oral every 8 hours  clotrimazole Lozenge 1 Lozenge Oral five times a day  fluconAZOLE   Tablet 400 milliGRAM(s) Oral daily  trimethoprim  160 mG/sulfamethoxazole 800 mG 1 Tablet(s) Oral every 12 hours      Heme / Onc:   fludarabine IVPB (eMAR) 58 milliGRAM(s) IV Intermittent every 24 hours  heparin  Infusion 424 Unit(s)/Hr IV Continuous <Continuous>      GI:  famotidine    Tablet 20 milliGRAM(s) Oral two times a day  senna 1 Tablet(s) Oral at bedtime PRN  sodium bicarbonate Mouth Rinse 10 milliLiter(s) Swish and Spit five times a day  ursodiol Capsule 300 milliGRAM(s) Oral two times a day      Cardiovascular:       Immunologic:       Other medications:   acetaminophen     Tablet .. 650 milliGRAM(s) Oral every 6 hours  aprepitant (Chemo) 80 milliGRAM(s) Oral every 24 hours  Biotene Dry Mouth Oral Rinse 5 milliLiter(s) Swish and Spit five times a day  Chemotherapy Worklist Order      chlorhexidine 4% Liquid 1 Application(s) Topical <User Schedule>  folic acid 1 milliGRAM(s) Oral daily  Infuse HPC Product      multivitamin 1 Tablet(s) Oral daily  ondansetron Injectable (Chemo) 8 milliGRAM(s) IV Push every 8 hours  sodium chloride 0.9%. 1000 milliLiter(s) IV Continuous <Continuous>  sodium chloride 0.9%. (Chemo) 1000 milliLiter(s) IV Continuous <Continuous>      PRN:   acetaminophen     Tablet .. 650 milliGRAM(s) Oral every 6 hours PRN  LORazepam   Injectable 1 milliGRAM(s) IV Push every 6 hours PRN  metoclopramide Injectable 10 milliGRAM(s) IV Push every 6 hours PRN  senna 1 Tablet(s) Oral at bedtime PRN  sodium chloride 0.9% lock flush 10 milliLiter(s) IV Push every 1 hour PRN    A/P:  37 year old male with severe aplastic anemia with multiple lines of treatment admitted for a haplo-identical bone marrow transplant with  Flu / Cy / TBI preparative regimen   Day -  3  start CTX hydration - 1/2NS @ 200ml /hr    Strict I&O, daily weights, prn diuresis   Day -6 - day -2 - Fludarabine 30mg/m2 IV  Day -6 - day - 5 - CTX 14.5mg/kg/day IV. CTX to start after urine SG < 1.010. Mesna  12mg / kg - hemorrhagic cystitis ppx   Day -2 - IVIG 0.4 g/kg (ideal body weight) every 3 weeks. Premedication with Tylenol and benadryl   Day -1 -  cGy x 1 dose   Day - 1 - at 2200 begin transplant hydration : 0.45Ns + 1 amp (50mEq) sodium bicarbonate at 150ml /hr; continue transplant hydration for 24 hours post infusion    Day + 2 at 2200 - begin CTX hydration (0.45NS + 10 mEq KCl/ @150ml /m2  continue 24 hours post last dose of CTX   Day + 3 - + 4 - CTX 50mg/kg/day IV. Begin CTX when urine SG < 1.010. EKG daily. Mesna for hemorraghic cystitis ppx.   Day + 5 - start Zarxio,  MMF and Tacrolimus. Check Tacrolimus levels on Monday and Thursday.   when ANC < 500, start Cipro 500mg po BID. If becomes febrile, pan cx, CXR and change Cipro to Cefepime 2g IV q 8 hours. Continue until count recovery  /- Fludarabine 4/5, intermittent nausea, continue antiemetics     1. Infectious Disease:   acyclovir   Oral Tab/Cap 400 milliGRAM(s) Oral every 8 hours  clotrimazole Lozenge 1 Lozenge Oral five times a day  fluconAZOLE   Tablet 400 milliGRAM(s) Oral daily  trimethoprim  160 mG/sulfamethoxazole 800 mG 1 Tablet(s) Oral every 12 hours    2. VOD Prophylaxis: Actigall, Glutamine, Heparin (dosed at 100 units / kg / day)     3. GI Prophylaxis:   famotidine    Tablet 20 milliGRAM(s) Oral two times a day    4. Mouthcare - NS / NaHCO3 rinses, Mycelex, Biotene; Skin care     5. GVHD prophylaxis   TBI day -1   CTX days +3, +4   MMF, tacro to start on day + 5     6. Transfuse & replete electrolytes prn     7. IV hydration, daily weights, strict I&O, prn diuresis     8. PO intake as tolerated, nutrition follow up as needed, MVI, folic acid     9. Antiemetics, anti-diarrhea medications:   LORazepam   Injectable 1 milliGRAM(s) IV Push every 6 hours PRN  metoclopramide Injectable 10 milliGRAM(s) IV Push every 6 hours PRN  ondansetron Injectable (Chemo) 8 milliGRAM(s) IV Push every 8 hours  aprepitant (Chemo) 80 milliGRAM(s) Oral every 24 hours    10. OOB as tolerated, physical therapy consult if needed     11. Monitor coags / fibrinogen 2x week, vitamin K as needed     12. Monitor closely for clinical changes, monitor for fevers     13. Emotional support provided, plan of care discussed and questions addressed     14. Patient education done regarding chemotherapy prep, plan of care, restrictions and discharge planning     15. Continue regular social work input     I have written the above note for Dr. Wilder who performed service with me in the room.   Mila De Guzman NP-C ([phone])    I have seen and examined patient with NP, I agree with above note as scribed.                    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 () with Flu / Cy / TBI prep regimen for treatment of severe aplastic anemia    S: Patient seen and examined with HPC Transplant Team:   + intermittent nausea     O: Vitals:   Vital Signs Last 24 Hrs  T(C): 37.2 (2023 05:45), Max: 37.2 (2023 05:45)  T(F): 99 (2023 05:45), Max: 99 (2023 05:45)  HR: 90 (2023 05:45) (80 - 99)  BP: 100/62 (2023 05:45) (100/62 - 132/71)  BP(mean): --  RR: 18 (2023 05:45) (18 - 18)  SpO2: 97% (2023 05:45) (97% - 99%)    Parameters below as of 2023 05:45  Patient On (Oxygen Delivery Method): room air    Admit weight: 102.8kg   Daily Weight in k.8 (2023 10:24)  Today's weight: 100.6     Intake / Output:   -04 @ 07:01  -  06-05 @ 07:00  --------------------------------------------------------  IN: 4373 mL / OUT: 5930 mL / NET: -1557 mL    PE:   Oropharynx: Clear  Oral Mucositis: (-)                                                        Grade:   CVS: RRR, +S1,S2  Lungs: CTA B/L  Abdomen: Soft, NT, ND, +BS  Extremities: No edema in BLE  Gastric Mucositis: (-)                                                 Grade:   Intestinal Mucositis:   (-)                                           Grade:   Skin: Intact  TLC: CDI  Neuro: Alert, Ox3  Pain: Denies    Labs:   CBC Full  -  ( 2023 07:13 )  WBC Count : 1.65 K/uL  Hemoglobin : 7.4 g/dL  Hematocrit : 21.9 %  Platelet Count - Automated : 143 K/uL  Mean Cell Volume : 102.8 fl  Mean Cell Hemoglobin : 34.7 pg  Mean Cell Hemoglobin Concentration : 33.8 gm/dL  Auto Neutrophil # : x  Auto Lymphocyte # : x  Auto Monocyte # : x  Auto Eosinophil # : x  Auto Basophil # : x  Auto Neutrophil % : x  Auto Lymphocyte % : x  Auto Monocyte % : x  Auto Eosinophil % : x  Auto Basophil % : x                          7.4    1.65  )-----------( 143      ( 2023 07:13 )             21.9       Meds:   Antimicrobials:   acyclovir   Oral Tab/Cap 400 milliGRAM(s) Oral every 8 hours  clotrimazole Lozenge 1 Lozenge Oral five times a day  fluconAZOLE   Tablet 400 milliGRAM(s) Oral daily  trimethoprim  160 mG/sulfamethoxazole 800 mG 1 Tablet(s) Oral every 12 hours      Heme / Onc:   fludarabine IVPB (eMAR) 58 milliGRAM(s) IV Intermittent every 24 hours  heparin  Infusion 424 Unit(s)/Hr IV Continuous <Continuous>      GI:  famotidine    Tablet 20 milliGRAM(s) Oral two times a day  senna 1 Tablet(s) Oral at bedtime PRN  sodium bicarbonate Mouth Rinse 10 milliLiter(s) Swish and Spit five times a day  ursodiol Capsule 300 milliGRAM(s) Oral two times a day      Cardiovascular:       Immunologic:       Other medications:   acetaminophen     Tablet .. 650 milliGRAM(s) Oral every 6 hours  aprepitant (Chemo) 80 milliGRAM(s) Oral every 24 hours  Biotene Dry Mouth Oral Rinse 5 milliLiter(s) Swish and Spit five times a day  Chemotherapy Worklist Order      chlorhexidine 4% Liquid 1 Application(s) Topical <User Schedule>  folic acid 1 milliGRAM(s) Oral daily  Infuse HPC Product      multivitamin 1 Tablet(s) Oral daily  ondansetron Injectable (Chemo) 8 milliGRAM(s) IV Push every 8 hours  sodium chloride 0.9%. 1000 milliLiter(s) IV Continuous <Continuous>  sodium chloride 0.9%. (Chemo) 1000 milliLiter(s) IV Continuous <Continuous>      PRN:   acetaminophen     Tablet .. 650 milliGRAM(s) Oral every 6 hours PRN  LORazepam   Injectable 1 milliGRAM(s) IV Push every 6 hours PRN  metoclopramide Injectable 10 milliGRAM(s) IV Push every 6 hours PRN  senna 1 Tablet(s) Oral at bedtime PRN  sodium chloride 0.9% lock flush 10 milliLiter(s) IV Push every 1 hour PRN    A/P:  37 year old male with severe aplastic anemia with multiple lines of treatment admitted for a haplo-identical bone marrow transplant with  Flu / Cy / TBI preparative regimen   Day -  3  start CTX hydration - 1/2NS @ 200ml /hr    Strict I&O, daily weights, prn diuresis   Day -6 - day -2 - Fludarabine 30mg/m2 IV  Day -6 - day - 5 - CTX 14.5mg/kg/day IV. CTX to start after urine SG < 1.010. Mesna  12mg / kg - hemorrhagic cystitis ppx   Day -2 - IVIG 0.4 g/kg (ideal body weight) every 3 weeks. Premedication with Tylenol and benadryl   Day -1 -  cGy x 1 dose   Day - 1 - at 2200 begin transplant hydration : 0.45Ns + 1 amp (50mEq) sodium bicarbonate at 150ml /hr; continue transplant hydration for 24 hours post infusion    Day + 2 at 0 - begin CTX hydration (0.45NS + 10 mEq KCl/ @150ml /m2  continue 24 hours post last dose of CTX   Day + 3 - + 4 - CTX 50mg/kg/day IV. Begin CTX when urine SG < 1.010. EKG daily. Mesna for hemorraghic cystitis ppx.   Day + 5 - start Zarxio,  MMF and Tacrolimus. Check Tacrolimus levels on Monday and Thursday.   when ANC < 500, start Cipro 500mg po BID. If becomes febrile, pan cx, CXR and change Cipro to Cefepime 2g IV q 8 hours. Continue until count recovery  - Fludarabine , intermittent nausea, continue antiemetics     1. Infectious Disease:   acyclovir   Oral Tab/Cap 400 milliGRAM(s) Oral every 8 hours  clotrimazole Lozenge 1 Lozenge Oral five times a day  fluconAZOLE   Tablet 400 milliGRAM(s) Oral daily  trimethoprim  160 mG/sulfamethoxazole 800 mG 1 Tablet(s) Oral every 12 hours    2. VOD Prophylaxis: Actigall, Glutamine, Heparin (dosed at 100 units / kg / day)     3. GI Prophylaxis:   famotidine    Tablet 20 milliGRAM(s) Oral two times a day    4. Mouthcare - NS / NaHCO3 rinses, Mycelex, Biotene; Skin care     5. GVHD prophylaxis   TBI day -1   CTX days +3, +4   MMF, tacro to start on day + 5     6. Transfuse & replete electrolytes prn     7. IV hydration, daily weights, strict I&O, prn diuresis     8. PO intake as tolerated, nutrition follow up as needed, MVI, folic acid     9. Antiemetics, anti-diarrhea medications:   LORazepam   Injectable 1 milliGRAM(s) IV Push every 6 hours PRN  metoclopramide Injectable 10 milliGRAM(s) IV Push every 6 hours PRN  ondansetron Injectable (Chemo) 8 milliGRAM(s) IV Push every 8 hours  aprepitant (Chemo) 80 milliGRAM(s) Oral every 24 hours  Olanzapine 5mg PO x 3 doses     10. OOB as tolerated, physical therapy consult if needed     11. Monitor coags / fibrinogen 2x week, vitamin K as needed     12. Monitor closely for clinical changes, monitor for fevers     13. Emotional support provided, plan of care discussed and questions addressed     14. Patient education done regarding chemotherapy prep, plan of care, restrictions and discharge planning     15. Continue regular social work input     I have written the above note for Dr. Wilder who performed service with me in the room.   Mila De Guzman NP-C ([phone])    I have seen and examined patient with NP, I agree with above note as scribed.                    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 () with Flu / Cy / TBI prep regimen for treatment of severe aplastic anemia    S: Patient seen and examined with HPC Transplant Team:   + intermittent nausea     O: Vitals:   Vital Signs Last 24 Hrs  T(C): 37.2 (2023 05:45), Max: 37.2 (2023 05:45)  T(F): 99 (2023 05:45), Max: 99 (2023 05:45)  HR: 90 (2023 05:45) (80 - 99)  BP: 100/62 (2023 05:45) (100/62 - 132/71)  BP(mean): --  RR: 18 (2023 05:45) (18 - 18)  SpO2: 97% (2023 05:45) (97% - 99%)    Parameters below as of 2023 05:45  Patient On (Oxygen Delivery Method): room air    Admit weight: 102.8kg   Daily Weight in k.8 (2023 10:24)  Today's weight: 100.6     Intake / Output:   -04 @ 07:01  -  06-05 @ 07:00  --------------------------------------------------------  IN: 4373 mL / OUT: 5930 mL / NET: -1557 mL    PE:   Oropharynx: Clear  Oral Mucositis: (-)                                                        Grade:   CVS: RRR, +S1,S2  Lungs: CTA B/L  Abdomen: Soft, NT, ND, +BS  Extremities: No edema in BLE  Gastric Mucositis: (-)                                                 Grade:   Intestinal Mucositis:   (-)                                           Grade:   Skin: Intact  TLC: CDI  Neuro: Alert, Ox3  Pain: Denies    Labs:   CBC Full  -  ( 2023 07:13 )  WBC Count : 1.65 K/uL  Hemoglobin : 7.4 g/dL  Hematocrit : 21.9 %  Platelet Count - Automated : 143 K/uL  Mean Cell Volume : 102.8 fl  Mean Cell Hemoglobin : 34.7 pg  Mean Cell Hemoglobin Concentration : 33.8 gm/dL  Auto Neutrophil # : x  Auto Lymphocyte # : x  Auto Monocyte # : x  Auto Eosinophil # : x  Auto Basophil # : x  Auto Neutrophil % : x  Auto Lymphocyte % : x  Auto Monocyte % : x  Auto Eosinophil % : x  Auto Basophil % : x                          7.4    1.65  )-----------( 143      ( 2023 07:13 )             21.9     Meds:   Antimicrobials:   acyclovir   Oral Tab/Cap 400 milliGRAM(s) Oral every 8 hours  clotrimazole Lozenge 1 Lozenge Oral five times a day  fluconAZOLE   Tablet 400 milliGRAM(s) Oral daily  trimethoprim  160 mG/sulfamethoxazole 800 mG 1 Tablet(s) Oral every 12 hours    Heme / Onc:   fludarabine IVPB (eMAR) 58 milliGRAM(s) IV Intermittent every 24 hours  heparin  Infusion 424 Unit(s)/Hr IV Continuous <Continuous>    GI:  famotidine    Tablet 20 milliGRAM(s) Oral two times a day  senna 1 Tablet(s) Oral at bedtime PRN  sodium bicarbonate Mouth Rinse 10 milliLiter(s) Swish and Spit five times a day  ursodiol Capsule 300 milliGRAM(s) Oral two times a day    Other medications:   acetaminophen     Tablet .. 650 milliGRAM(s) Oral every 6 hours  aprepitant (Chemo) 80 milliGRAM(s) Oral every 24 hours  Biotene Dry Mouth Oral Rinse 5 milliLiter(s) Swish and Spit five times a day  Chemotherapy Worklist Order      chlorhexidine 4% Liquid 1 Application(s) Topical <User Schedule>  folic acid 1 milliGRAM(s) Oral daily  Infuse HPC Product      multivitamin 1 Tablet(s) Oral daily  ondansetron Injectable (Chemo) 8 milliGRAM(s) IV Push every 8 hours  sodium chloride 0.9%. 1000 milliLiter(s) IV Continuous <Continuous>  sodium chloride 0.9%. (Chemo) 1000 milliLiter(s) IV Continuous <Continuous>    PRN:   acetaminophen     Tablet .. 650 milliGRAM(s) Oral every 6 hours PRN  LORazepam   Injectable 1 milliGRAM(s) IV Push every 6 hours PRN  metoclopramide Injectable 10 milliGRAM(s) IV Push every 6 hours PRN  senna 1 Tablet(s) Oral at bedtime PRN  sodium chloride 0.9% lock flush 10 milliLiter(s) IV Push every 1 hour PRN    A/P:  37 year old male with severe aplastic anemia with multiple lines of treatment admitted for a haplo-identical bone marrow transplant with  Flu / Cy / TBI preparative regimen   Day -  3  start CTX hydration - 1/2NS @ 200ml /hr    Strict I&O, daily weights, prn diuresis   Day -6 - day -2 - Fludarabine 30mg/m2 IV  Day -6 - day - 5 - CTX 14.5mg/kg/day IV. CTX to start after urine SG < 1.010. Mesna  12mg / kg - hemorrhagic cystitis ppx   Day -2 - IVIG 0.4 g/kg (ideal body weight) every 3 weeks. Premedication with Tylenol and benadryl   Day -1 -  cGy x 1 dose   Day - 1 - at 2200 begin transplant hydration : 0.45Ns + 1 amp (50mEq) sodium bicarbonate at 150ml /hr; continue transplant hydration for 24 hours post infusion    Day + 2 at 2200 - begin CTX hydration (0.45NS + 10 mEq KCl/ @150ml /m2  continue 24 hours post last dose of CTX   Day + 3 - + 4 - CTX 50mg/kg/day IV. Begin CTX when urine SG < 1.010. EKG daily. Mesna for hemorraghic cystitis ppx.   Day + 5 - start Zarxio,  MMF and Tacrolimus. Check Tacrolimus levels on Monday and Thursday.   when ANC < 500, start Cipro 500mg po BID. If becomes febrile, pan cx, CXR and change Cipro to Cefepime 2g IV q 8 hours. Continue until count recovery  - Fludarabine /, intermittent nausea, continue antiemetics     1. Infectious Disease:   acyclovir   Oral Tab/Cap 400 milliGRAM(s) Oral every 8 hours  clotrimazole Lozenge 1 Lozenge Oral five times a day  fluconAZOLE   Tablet 400 milliGRAM(s) Oral daily  trimethoprim  160 mG/sulfamethoxazole 800 mG 1 Tablet(s) Oral every 12 hours    2. VOD Prophylaxis: Actigall, Glutamine, Heparin (dosed at 100 units / kg / day)     3. GI Prophylaxis:   famotidine    Tablet 20 milliGRAM(s) Oral two times a day    4. Mouthcare - NS / NaHCO3 rinses, Mycelex, Biotene; Skin care     5. GVHD prophylaxis   TBI day -1   CTX days +3, +4   MMF, tacro to start on day + 5     6. Transfuse & replete electrolytes prn     7. IV hydration, daily weights, strict I&O, prn diuresis     8. PO intake as tolerated, nutrition follow up as needed, MVI, folic acid     9. Antiemetics, anti-diarrhea medications:   LORazepam   Injectable 1 milliGRAM(s) IV Push every 6 hours PRN  metoclopramide Injectable 10 milliGRAM(s) IV Push every 6 hours PRN  ondansetron Injectable (Chemo) 8 milliGRAM(s) IV Push every 8 hours  aprepitant (Chemo) 80 milliGRAM(s) Oral every 24 hours  Olanzapine 5mg PO x 3 doses     10. OOB as tolerated, physical therapy consult if needed     11. Monitor coags / fibrinogen 2x week, vitamin K as needed     12. Monitor closely for clinical changes, monitor for fevers     13. Emotional support provided, plan of care discussed and questions addressed     14. Patient education done regarding chemotherapy prep, plan of care, restrictions and discharge planning     15. Continue regular social work input     I have written the above note for Dr. Wilder who performed service with me in the room.   Mila De Guzman NP-C ([phone])    I have seen and examined patient with NP, I agree with above note as scribed.

## 2023-06-05 NOTE — DIETITIAN INITIAL EVALUATION ADULT - OTHER INFO
- GI: persistent nausea (improving with Reglan, Ativan), No other GI distress reported at time of RD visit. Last BM: 06/05 . Bowel regimen: none. Continues on Pepcid.   - Renal: K+ Phos and Mg WNL today 06/06. Ordered for IVF, diuretics.   - Micronutrient/Other supplementation: multivitamin, folic acid

## 2023-06-05 NOTE — DIETITIAN INITIAL EVALUATION ADULT - PERSON TAUGHT/METHOD
Discussed importance of adequate consumption of meals/supplements to optimize protein-energy intake. Encouraged small/frequent meals, nutrient dense snacks, prioritizing protein foods at meal time. Reviewed BMT nutrition recommendations: food safety recommendations, increased protein-energy needs, Glutasolve 1x/day./verbal instruction/individual instruction/teach back - (Patient repeats in own words)/patient instructed

## 2023-06-05 NOTE — DIETITIAN INITIAL EVALUATION ADULT - ADD RECOMMEND
4) Encourage PO intake, obtain food preferences, provide feeding assistance as needed.   5) Monitor nutritional intake/tolerance, weights, labs, hydration status, skin integrity, BM, GI symptoms.   6) Continue micronutrient supplementation: multivitamin, folic acid

## 2023-06-05 NOTE — PROGRESS NOTE ADULT - CRITICAL CARE ATTENDING COMMENT
1. Admit to BMTU Today is day - 3  2. TLC placement in IR   3. Day -6 - day + 5 - antiemetics   4. Day - 6 start CTX hydration - 1/2NS @ 200ml /hr    5. Strict I&O, daily weights, prn diuresis   6. Day -6 - day -2 - Fludarabine 30mg/m2 IV  7. Day -6 - day - 5 - CTX 14.5mg/kg/day IV. CTX to start after urine SG < 1.010. Mesna  12mg / kg - hemorrhagic cystitis ppx   8. Day -2 - IVIG 0.4 g/kg (ideal body weight) every 3 weeks. Premedication with Tylenol and benadryl  9. Day -1 -  cGy x 1 dose   10. Day - 1 - at 2200 begin transplant hydration : 0.45Ns + 1 amp (50mEq) sodium bicarbonate at 150ml /hr; continue transplant hydration for 24 hours post infusion   11. Day 0 – HPC transplant   12. Day + 2 at 2200 - begin CTX hydration (0.45NS + 10 mEq KCl/ @150ml /m2  continue 24 hours post last dose of CTX   13. Day + 3 - + 4 - CTX 50mg/kg/day IV. Begin CTX when urine SG < 1.010. EKG daily. Mesna for hemorraghic cystitis ppx.   14. Day + 5 - start Zarxio,  MMF and Tacrolimus. Check Tacrolimus levels on Monday and Thursday.   15. Anxiolytics, antinausea, antidiarrhea medications as needed   16. Lasix PRN while being aggressively hydrated to avoid VOD   17. Nutritional support, pain management  Need for prophylactic measures:  1. VOD prophylaxis - low dose heparin gtt (dosed at 100 units / kg / day), glutamine supplementation, Actigall BID   2. PCP prophylaxis - Bactrim DS through day -2    3. Antiviral prophylaxis - Continue Acyclovir   4. Antifungal prophylaxis- Diflucan 400 mg po daily.  5.. GI prophylaxis - Protonix po QD   6. Antibacterial prophylaxis - when ANC < 500, start Cipro 500mg po BID. If becomes febrile, pan cx, CXR and change Cipro to Cefepime 2g IV q 8 hours. Continue until count recovery  7. Aggressive mouth care and skin care as per protocol  8. GVHD prophylaxis- high dose CTX; MMF and Tacrolimus.    Patient with hemoglobin drop of ~2  on 6/4, likely due to chemotherapy however will repeat CBC tonight to establish stability, likely will require transfusion. 1. Admit to BMTU Today is day - 3  2. TLC placement in IR   3. Day -6 - day + 5 - antiemetics   4. Day - 6 start CTX hydration - 1/2NS @ 200ml /hr    5. Strict I&O, daily weights, prn diuresis   6. Day -6 - day -2 - Fludarabine 30mg/m2 IV  7. Day -6 - day - 5 - CTX 14.5mg/kg/day IV. CTX to start after urine SG < 1.010. Mesna  12mg / kg - hemorrhagic cystitis ppx   8. Day -2 - IVIG 0.4 g/kg (ideal body weight) every 3 weeks. Premedication with Tylenol and benadryl  9. Day -1 -  cGy x 1 dose   10. Day - 1 - at 2200 begin transplant hydration : 0.45Ns + 1 amp (50mEq) sodium bicarbonate at 150ml /hr; continue transplant hydration for 24 hours post infusion   11. Day 0 – HPC transplant   12. Day + 2 at 2200 - begin CTX hydration (0.45NS + 10 mEq KCl/ @150ml /m2  continue 24 hours post last dose of CTX   13. Day + 3 - + 4 - CTX 50mg/kg/day IV. Begin CTX when urine SG < 1.010. EKG daily. Mesna for hemorraghic cystitis ppx.   14. Day + 5 - start Zarxio,  MMF and Tacrolimus. Check Tacrolimus levels on Monday and Thursday.   15. Anxiolytics, antinausea, antidiarrhea medications as needed   16. Lasix PRN while being aggressively hydrated to avoid VOD   17. Nutritional support, pain management  Need for prophylactic measures:  1. VOD prophylaxis - low dose heparin gtt (dosed at 100 units / kg / day), glutamine supplementation, Actigall BID   2. PCP prophylaxis - Bactrim DS through day -2    3. Antiviral prophylaxis - Continue Acyclovir   4. Antifungal prophylaxis- Diflucan 400 mg po daily.  5.. GI prophylaxis - Protonix po QD   6. Antibacterial prophylaxis - when ANC < 500, start Cipro 500mg po BID. If becomes febrile, pan cx, CXR and change Cipro to Cefepime 2g IV q 8 hours. Continue until count recovery  7. Aggressive mouth care and skin care as per protocol  8. GVHD prophylaxis- high dose CTX; MMF and Tacrolimus.    Patient with hemoglobin drop of ~2  on 6/4, likely due to chemotherapy however will repeat CBC tonight to establish stability, likely will require transfusion.  6/5 describes nausea

## 2023-06-05 NOTE — DIETITIAN INITIAL EVALUATION ADULT - PHYSCIAL ASSESSMENT
Weight Hx Per:  - Source: Pt   - UBW: 225 pounds   - Reported weight changes: none    Current Admission Weights:  - Dosing weight: 226.6 pounds (06/02)    Weight Change:  - Weight stable.  Will continue to monitor weight trends as available/able.     IBW: 157 pounds   %IBW: 145%

## 2023-06-05 NOTE — DIETITIAN INITIAL EVALUATION ADULT - DIET TYPE
2) Add oral nutrition supplement: Ensure Clear 2/day  3) RD will provide protein-fortified smoothie of day 1x/day (Monday-Friday, 300 vinny 18 gm protein) 2) Add oral nutrition supplement: Ensure Clear 2/day  3) RD will provide vegan protein-fortified smoothie of day 1x/day (Monday-Friday, 300 vinny 13 gm protein)

## 2023-06-05 NOTE — DIETITIAN INITIAL EVALUATION ADULT - ENERGY INTAKE
Poor (<50%) Decreased appetite/PO intake since admission, reports consuming <50% of meals x 2 days.   - Amenable to receiving oral nutritional supplement to optimize PO intake: Ensure Clear 2x/day  - RD will provide protein-fortified smoothie of day 1x/day (Monday-Friday, 300 vinny 18 gm protein)   - Food preferences obtained; RD to honor as able.  Decreased appetite/PO intake since admission, reports consuming <50% of meals x 2 days.   - Amenable to receiving oral nutritional supplement to optimize PO intake: Ensure Clear 2x/day  - RD will provide vegan protein-fortified smoothie of day 1x/day (Monday-Friday, 300 vinny 13 gm protein)   - Food preferences obtained; RD to honor as able.

## 2023-06-05 NOTE — DIETITIAN INITIAL EVALUATION ADULT - PROBLEM SELECTOR PLAN 2
1. VOD prophylaxis - low dose heparin gtt (dosed at 100 units / kg / day), glutamine supplementation, Actigall BID   2. PCP prophylaxis - Bactrim DS through day -2    3. Antiviral prophylaxis - Continue Acyclovir   4. Antifungal prophylaxis- Diflucan 400 mg po daily.  5.. GI prophylaxis - Protonix po QD   6. Antibacterial prophylaxis - when ANC < 500, start Cipro 500mg po BID. If becomes febrile, pan cx, CXR and change Cipro to Cefepime 2g IV q 8 hours. Continue until count recovery  7. Aggressive mouth care and skin care as per protocol  8. GVHD prophylaxis- high dose CTX; MMF and Tacrolimus

## 2023-06-05 NOTE — DIETITIAN INITIAL EVALUATION ADULT - REASON FOR ADMISSION
Per chart " 37 year old male with severe aplastic anemia with multiple lines of treatment admitted for a haplo-identical bone marrow transplant with  Flu / Cy / TBI preparative regimen . Day -  3"

## 2023-06-05 NOTE — DIETITIAN INITIAL EVALUATION ADULT - PROBLEM SELECTOR PLAN 1
1. Admit to BMTU   2. TLC placement in IR   3. Day -6 - day + 5 - antiemetics   4. Day - 6 start CTX hydration - 1/2NS @ 200ml /hr    5. Strict I&O, daily weights, prn diuresis   6. Day -6 - day -2 - Fludarabine 30mg/m2 IV  7. Day -6 - day - 5 - CTX 14.5mg/kg/day IV. CTX to start after urine SG < 1.010. Mesna  12mg / kg - hemorrhagic cystitis ppx   8. Day -2 - IVIG 0.4 g/kg (ideal body weight) every 3 weeks. Premedication with Tylenol and benadryl  9. Day -1 -  cGy x 1 dose   10. Day - 1 - at 2200 begin transplant hydration : 0.45Ns + 1 amp (50mEq) sodium bicarbonate at 150ml /hr; continue transplant hydration for 24 hours post infusion   11. Day 0 – HPC transplant   12. Day + 2 at 2200 - begin CTX hydration (0.45NS + 10 mEq KCl/ @150ml /m2  continue 24 hours post last dose of CTX   13. Day + 3 - + 4 - CTX 50mg/kg/day IV. Begin CTX when urine SG < 1.010. EKG daily. Mesna for hemorraghic cystitis ppx.   14. Day + 5 - start Zarxio,  MMF and Tacrolimus. Check Tacrolimus levels on Monday and Thursday.   15. Anxiolytics, antinausea, antidiarrhea medications as needed   16. Lasix PRN while being aggressively hydrated to avoid VOD   17. Nutritional support, pain management

## 2023-06-05 NOTE — DIETITIAN INITIAL EVALUATION ADULT - PERTINENT MEDS FT
MEDICATIONS  (STANDING):  acetaminophen     Tablet .. 650 milliGRAM(s) Oral every 6 hours  acyclovir   Oral Tab/Cap 400 milliGRAM(s) Oral every 8 hours  aprepitant (Chemo) 80 milliGRAM(s) Oral every 24 hours  Biotene Dry Mouth Oral Rinse 5 milliLiter(s) Swish and Spit five times a day  Chemotherapy Worklist Order      chlorhexidine 4% Liquid 1 Application(s) Topical <User Schedule>  clotrimazole Lozenge 1 Lozenge Oral five times a day  famotidine    Tablet 20 milliGRAM(s) Oral two times a day  fluconAZOLE   Tablet 400 milliGRAM(s) Oral daily  fludarabine IVPB (eMAR) 58 milliGRAM(s) IV Intermittent every 24 hours  folic acid 1 milliGRAM(s) Oral daily  heparin  Infusion 424 Unit(s)/Hr (4.24 mL/Hr) IV Continuous <Continuous>  Infuse HPC Product      multivitamin 1 Tablet(s) Oral daily  ondansetron Injectable (Chemo) 8 milliGRAM(s) IV Push every 8 hours  sodium bicarbonate Mouth Rinse 10 milliLiter(s) Swish and Spit five times a day  sodium chloride 0.9%. 1000 milliLiter(s) (20 mL/Hr) IV Continuous <Continuous>  sodium chloride 0.9%. (Chemo) 1000 milliLiter(s) (200 mL/Hr) IV Continuous <Continuous>  trimethoprim  160 mG/sulfamethoxazole 800 mG 1 Tablet(s) Oral every 12 hours  ursodiol Capsule 300 milliGRAM(s) Oral two times a day    MEDICATIONS  (PRN):  acetaminophen     Tablet .. 650 milliGRAM(s) Oral every 6 hours PRN Temp greater or equal to 38C (100.4F), Mild Pain (1 - 3)  LORazepam   Injectable 1 milliGRAM(s) IV Push every 6 hours PRN Anxiety / Nausea / vomiting  metoclopramide Injectable 10 milliGRAM(s) IV Push every 6 hours PRN Nausea and/or Vomiting  senna 1 Tablet(s) Oral at bedtime PRN Constipation  sodium chloride 0.9% lock flush 10 milliLiter(s) IV Push every 1 hour PRN Pre/post blood products, medications, blood draw, and to maintain line patency

## 2023-06-06 LAB
ALBUMIN SERPL ELPH-MCNC: 4.3 G/DL — SIGNIFICANT CHANGE UP (ref 3.3–5)
ALP SERPL-CCNC: 93 U/L — SIGNIFICANT CHANGE UP (ref 40–120)
ALT FLD-CCNC: 54 U/L — HIGH (ref 10–45)
ANION GAP SERPL CALC-SCNC: 12 MMOL/L — SIGNIFICANT CHANGE UP (ref 5–17)
ANISOCYTOSIS BLD QL: SLIGHT — SIGNIFICANT CHANGE UP
AST SERPL-CCNC: 38 U/L — SIGNIFICANT CHANGE UP (ref 10–40)
BASOPHILS # BLD AUTO: 0.02 K/UL — SIGNIFICANT CHANGE UP (ref 0–0.2)
BASOPHILS NFR BLD AUTO: 1.8 % — SIGNIFICANT CHANGE UP (ref 0–2)
BILIRUB SERPL-MCNC: 2 MG/DL — HIGH (ref 0.2–1.2)
BUN SERPL-MCNC: 13 MG/DL — SIGNIFICANT CHANGE UP (ref 7–23)
CALCIUM SERPL-MCNC: 8.7 MG/DL — SIGNIFICANT CHANGE UP (ref 8.4–10.5)
CHLORIDE SERPL-SCNC: 104 MMOL/L — SIGNIFICANT CHANGE UP (ref 96–108)
CO2 SERPL-SCNC: 21 MMOL/L — LOW (ref 22–31)
CREAT SERPL-MCNC: 0.94 MG/DL — SIGNIFICANT CHANGE UP (ref 0.5–1.3)
DACRYOCYTES BLD QL SMEAR: SLIGHT — SIGNIFICANT CHANGE UP
EGFR: 108 ML/MIN/1.73M2 — SIGNIFICANT CHANGE UP
EOSINOPHIL # BLD AUTO: 0.05 K/UL — SIGNIFICANT CHANGE UP (ref 0–0.5)
EOSINOPHIL NFR BLD AUTO: 5.4 % — SIGNIFICANT CHANGE UP (ref 0–6)
GLUCOSE SERPL-MCNC: 98 MG/DL — SIGNIFICANT CHANGE UP (ref 70–99)
HCT VFR BLD CALC: 21.9 % — LOW (ref 39–50)
HGB BLD-MCNC: 7.6 G/DL — LOW (ref 13–17)
LDH SERPL L TO P-CCNC: 295 U/L — HIGH (ref 50–242)
LYMPHOCYTES # BLD AUTO: 0.08 K/UL — LOW (ref 1–3.3)
LYMPHOCYTES # BLD AUTO: 8.9 % — LOW (ref 13–44)
MAGNESIUM SERPL-MCNC: 2.3 MG/DL — SIGNIFICANT CHANGE UP (ref 1.6–2.6)
MANUAL SMEAR VERIFICATION: SIGNIFICANT CHANGE UP
MCHC RBC-ENTMCNC: 34.7 GM/DL — SIGNIFICANT CHANGE UP (ref 32–36)
MCHC RBC-ENTMCNC: 34.7 PG — HIGH (ref 27–34)
MCV RBC AUTO: 100 FL — SIGNIFICANT CHANGE UP (ref 80–100)
MONOCYTES # BLD AUTO: 0.1 K/UL — SIGNIFICANT CHANGE UP (ref 0–0.9)
MONOCYTES NFR BLD AUTO: 10.7 % — SIGNIFICANT CHANGE UP (ref 2–14)
MYELOCYTES NFR BLD: 0.9 % — HIGH (ref 0–0)
NEUTROPHILS # BLD AUTO: 0.64 K/UL — LOW (ref 1.8–7.4)
NEUTROPHILS NFR BLD AUTO: 66.1 % — SIGNIFICANT CHANGE UP (ref 43–77)
NEUTS BAND # BLD: 6.2 % — SIGNIFICANT CHANGE UP (ref 0–8)
OVALOCYTES BLD QL SMEAR: SLIGHT — SIGNIFICANT CHANGE UP
PHOSPHATE SERPL-MCNC: 3 MG/DL — SIGNIFICANT CHANGE UP (ref 2.5–4.5)
PLAT MORPH BLD: NORMAL — SIGNIFICANT CHANGE UP
PLATELET # BLD AUTO: 152 K/UL — SIGNIFICANT CHANGE UP (ref 150–400)
POIKILOCYTOSIS BLD QL AUTO: SLIGHT — SIGNIFICANT CHANGE UP
POLYCHROMASIA BLD QL SMEAR: SLIGHT — SIGNIFICANT CHANGE UP
POTASSIUM SERPL-MCNC: 4 MMOL/L — SIGNIFICANT CHANGE UP (ref 3.5–5.3)
POTASSIUM SERPL-SCNC: 4 MMOL/L — SIGNIFICANT CHANGE UP (ref 3.5–5.3)
PROT SERPL-MCNC: 7 G/DL — SIGNIFICANT CHANGE UP (ref 6–8.3)
RBC # BLD: 2.19 M/UL — LOW (ref 4.2–5.8)
RBC # FLD: 19.6 % — HIGH (ref 10.3–14.5)
RBC BLD AUTO: ABNORMAL
ROULEAUX BLD QL SMEAR: PRESENT
SODIUM SERPL-SCNC: 137 MMOL/L — SIGNIFICANT CHANGE UP (ref 135–145)
WBC # BLD: 0.89 K/UL — CRITICAL LOW (ref 3.8–10.5)
WBC # FLD AUTO: 0.89 K/UL — CRITICAL LOW (ref 3.8–10.5)

## 2023-06-06 PROCEDURE — ZZZZZ: CPT

## 2023-06-06 RX ORDER — PROCHLORPERAZINE MALEATE 5 MG
10 TABLET ORAL EVERY 6 HOURS
Refills: 0 | Status: DISCONTINUED | OUTPATIENT
Start: 2023-06-06 | End: 2023-06-08

## 2023-06-06 RX ORDER — CIPROFLOXACIN LACTATE 400MG/40ML
500 VIAL (ML) INTRAVENOUS EVERY 12 HOURS
Refills: 0 | Status: DISCONTINUED | OUTPATIENT
Start: 2023-06-06 | End: 2023-06-10

## 2023-06-06 RX ADMIN — Medication 10 MILLILITER(S): at 11:32

## 2023-06-06 RX ADMIN — Medication 10 MILLILITER(S): at 17:54

## 2023-06-06 RX ADMIN — Medication 650 MILLIGRAM(S): at 08:57

## 2023-06-06 RX ADMIN — Medication 1 TABLET(S): at 11:31

## 2023-06-06 RX ADMIN — CHLORHEXIDINE GLUCONATE 1 APPLICATION(S): 213 SOLUTION TOPICAL at 08:59

## 2023-06-06 RX ADMIN — FLUCONAZOLE 400 MILLIGRAM(S): 150 TABLET ORAL at 11:31

## 2023-06-06 RX ADMIN — SODIUM CHLORIDE 20 MILLILITER(S): 9 INJECTION INTRAMUSCULAR; INTRAVENOUS; SUBCUTANEOUS at 06:19

## 2023-06-06 RX ADMIN — FAMOTIDINE 20 MILLIGRAM(S): 10 INJECTION INTRAVENOUS at 06:16

## 2023-06-06 RX ADMIN — Medication 10 MILLILITER(S): at 19:55

## 2023-06-06 RX ADMIN — IMMUNE GLOBULIN (HUMAN) 30 GRAM(S): 10 INJECTION INTRAVENOUS; SUBCUTANEOUS at 10:00

## 2023-06-06 RX ADMIN — FLUDARABINE PHOSPHATE 58 MILLIGRAM(S): 25 INJECTION, SOLUTION INTRAVENOUS at 16:43

## 2023-06-06 RX ADMIN — Medication 1 LOZENGE: at 19:54

## 2023-06-06 RX ADMIN — HEPARIN SODIUM 4.24 UNIT(S)/HR: 5000 INJECTION INTRAVENOUS; SUBCUTANEOUS at 06:54

## 2023-06-06 RX ADMIN — Medication 5 MILLILITER(S): at 11:32

## 2023-06-06 RX ADMIN — Medication 5 MILLILITER(S): at 19:54

## 2023-06-06 RX ADMIN — Medication 1 LOZENGE: at 11:32

## 2023-06-06 RX ADMIN — Medication 1 LOZENGE: at 08:59

## 2023-06-06 RX ADMIN — APREPITANT 80 MILLIGRAM(S): 80 CAPSULE ORAL at 11:31

## 2023-06-06 RX ADMIN — Medication 25 MILLIGRAM(S): at 08:57

## 2023-06-06 RX ADMIN — Medication 1 LOZENGE: at 00:13

## 2023-06-06 RX ADMIN — Medication 400 MILLIGRAM(S): at 06:16

## 2023-06-06 RX ADMIN — Medication 10 MILLILITER(S): at 08:59

## 2023-06-06 RX ADMIN — Medication 400 MILLIGRAM(S): at 13:08

## 2023-06-06 RX ADMIN — Medication 500 MILLIGRAM(S): at 17:56

## 2023-06-06 RX ADMIN — Medication 1 TABLET(S): at 06:19

## 2023-06-06 RX ADMIN — FAMOTIDINE 20 MILLIGRAM(S): 10 INJECTION INTRAVENOUS at 17:54

## 2023-06-06 RX ADMIN — Medication 400 MILLIGRAM(S): at 21:36

## 2023-06-06 RX ADMIN — Medication 5 MILLILITER(S): at 00:13

## 2023-06-06 RX ADMIN — ONDANSETRON 8 MILLIGRAM(S): 8 TABLET, FILM COATED ORAL at 21:32

## 2023-06-06 RX ADMIN — Medication 1 TABLET(S): at 17:54

## 2023-06-06 RX ADMIN — Medication 5 MILLILITER(S): at 17:54

## 2023-06-06 RX ADMIN — URSODIOL 300 MILLIGRAM(S): 250 TABLET, FILM COATED ORAL at 17:54

## 2023-06-06 RX ADMIN — Medication 1 LOZENGE: at 17:54

## 2023-06-06 RX ADMIN — ONDANSETRON 8 MILLIGRAM(S): 8 TABLET, FILM COATED ORAL at 13:08

## 2023-06-06 RX ADMIN — Medication 1 MILLIGRAM(S): at 11:31

## 2023-06-06 RX ADMIN — URSODIOL 300 MILLIGRAM(S): 250 TABLET, FILM COATED ORAL at 06:16

## 2023-06-06 RX ADMIN — Medication 10 MILLILITER(S): at 00:14

## 2023-06-06 RX ADMIN — ONDANSETRON 8 MILLIGRAM(S): 8 TABLET, FILM COATED ORAL at 06:17

## 2023-06-06 RX ADMIN — Medication 5 MILLILITER(S): at 08:57

## 2023-06-06 NOTE — PHARMACOTHERAPY INTERVENTION NOTE - COMMENTS
Diagnosis: Aplastic Anemia  BMT Attending: Dr. Wilder      Darren Puckett is a 37-year-old male with severe aplastic anemia previously treated with eATG +CsA + eltrombopag in April 2020. He relapsed November 2021 and was treated with rATG + CSA + prednisone + eltombopag and then ravulizumab. He is here for a haploidentical allogeneic stem cell transplant, today is day -2. Performed a medication reconciliation that is outlined below and assisted with placing chemotherapy orders in CPOE. Educated patient on chemotherapy including administration, schedule, supportive care (fluids, growth factor shots), immunosuppression and anti-infectives that will be administered. Also counseled patient on possible adverse events to expect as below.    Anticipated Transplant Regimen: Nonmyeloablative transplant from related donor (sister)  Conditioning Regimen: Flu/Cy/TBI  GVHD Prophylaxis: Post transplant cyclophosphamide, tacrolimus, and MMF      Medication Assessment and Plan:  Drug interactions: Tacrolimus with fluconazole (CYP 3A4 inhibitor) and letermovir (CYP 3A4 inhibitor)      Allergies:  Patient confirmed documented allergies are correct: NKDA    Chemotherapy Dose Adjustment Consideration: Patient’s CrCl ranges between X and X. 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 Was instructed to hold in anticipation of transplant admission      Medication Reconciliation:   Patient reports taking the following at home:  - Acyclovir 400 mg tablet three times daily  - Famotidine 20 mg tablet twice daily  - Folic acid 1 mg tablet daily  - Hydrochlorothiazide 25 mg capsule daily  - Cyclosporine   - Eltrombopag 75 mg capsule daily    Counseling Points for Conditioning Regimen:    Fludarabine:  - Route: Intravenously  - Expected ADRs: Myelosuppression, neuropathy (numbness and tingling of hands and feet), rash, diarrhea, taste changes (metallic taste), mouth sores, loss of appetite   - Rare: Neurotoxicity (coma, blindness, seizure – not very common)    Cyclophosphamide:  - Route: Intravenously  - Expected ADRs: nausea/vomiting (starts day of and can last for 6-7 days), myelosuppression, hair loss (3-6 weeks after), lower appetite, discoloration of skin or nails, diarrhea, mouth sores  - Rare: Cardiotoxicity, bladder toxicity, secondary cancers (~1% risk), loss of fertility,  - Supportive care: Fluids and Mesna, EKG, diuretics     TBI  - Route: Beams through a machine to eradicate disease in marrow   	To destroy cancer cells in areas where chemotherapy can’t easily reach (such as your nervous system, bones, skin, or testes).  	To create space for new marrow  - Expected ADRs: nausea/vomiting (1 to 2 hours post treatment), headache, diarrhea, skin reactions (more sensitive skin, change in complexion – pink or tan, will heal in 3 to 4 weeks), hair loss  - Long term: Having lower amounts of thyroid hormones, IPS (inflammation of your lungs), infertility, cataracts, secondary cancers    Time Spent:     Asmita Carranza, PharmD, BCOP  Stem Cell Transplant Clinical Pharmacy Specialist   Diagnosis: Aplastic Anemia  BMT Attending: Dr. Wilder      Darren Puckett is a 37-year-old male with severe aplastic anemia previously treated with eATG +CsA + eltrombopag in April 2020. He relapsed November 2021 and was treated with rATG + CSA + prednisone + eltombopag and then ravulizumab. He is here for a haploidentical allogeneic stem cell transplant, today is day -2. Performed a medication reconciliation that is outlined below and assisted with placing chemotherapy orders in CPOE. Educated patient on chemotherapy including administration, schedule, supportive care (fluids, growth factor shots), immunosuppression and anti-infectives that will be administered. Also counseled patient on possible adverse events to expect as below.    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 Was instructed to hold in anticipation of transplant admission  - Patient reports good relief with current nausea regimen      Medication Reconciliation:   Patient reports taking the following at home:  - Acyclovir 400 mg tablet three times daily  - Famotidine 20 mg tablet twice daily  - Folic acid 1 mg tablet daily  - Cyclosporine 275 mg (2.75 mL) every 12 hours    Counseling Points for Conditioning Regimen:    Fludarabine:  - Route: Intravenously  - Expected ADRs: Myelosuppression, neuropathy (numbness and tingling of hands and feet), rash, diarrhea, taste changes (metallic taste), mouth sores, loss of appetite   - Rare: Neurotoxicity (coma, blindness, seizure – not very common)    Cyclophosphamide:  - Route: Intravenously  - Expected ADRs: nausea/vomiting (starts day of and can last for 6-7 days), myelosuppression, hair loss (3-6 weeks after), lower appetite, discoloration of skin or nails, diarrhea, mouth sores  - Rare: Cardiotoxicity, bladder toxicity, secondary cancers (~1% risk), loss of fertility,  - Supportive care: Fluids and Mesna, EKG, diuretics     TBI  - Route: Beams through a machine to eradicate disease in marrow   	To destroy cancer cells in areas where chemotherapy can’t easily reach (such as your nervous system, bones, skin, or testes).  	To create space for new marrow  - Expected ADRs: nausea/vomiting (1 to 2 hours post treatment), headache, diarrhea, skin reactions (more sensitive skin, change in complexion – pink or tan, will heal in 3 to 4 weeks), hair loss  - Long term: Having lower amounts of thyroid hormones, IPS (inflammation of your lungs), infertility, cataracts, secondary cancers    Time Spent: 20 minutes    Asmita Carranza, PharmD, BCOP  Stem Cell Transplant Clinical Pharmacy Specialist

## 2023-06-06 NOTE — PATIENT PROFILE ADULT - FUNCTIONAL SCREEN CURRENT LEVEL: SWALLOWING (IF SCORE 2 OR MORE FOR ANY ITEM, CONSULT REHAB SERVICES), MLM)
0 = swallows foods/liquids without difficulty For information on Fall & Injury Prevention, visit: [email or web address] OR  [email or web address] OR  https://www.cdc.gov/steadi/patient.html

## 2023-06-06 NOTE — PROGRESS NOTE ADULT - CRITICAL CARE ATTENDING COMMENT
1. Admit to BMTU Today is day - 2  2. TLC placement in IR   3. Day -6 - day + 5 - antiemetics   4. Day - 6 start CTX hydration - 1/2NS @ 200ml /hr    5. Strict I&O, daily weights, prn diuresis   6. Day -6 - day -2 - Fludarabine 30mg/m2 IV  7. Day -6 - day - 5 - CTX 14.5mg/kg/day IV. CTX to start after urine SG < 1.010. Mesna  12mg / kg - hemorrhagic cystitis ppx   8. Day -2 - IVIG 0.4 g/kg (ideal body weight) every 3 weeks. Premedication with Tylenol and benadryl  9. Day -1 -  cGy x 1 dose   10. Day - 1 - at 2200 begin transplant hydration : 0.45Ns + 1 amp (50mEq) sodium bicarbonate at 150ml /hr; continue transplant hydration for 24 hours post infusion   11. Day 0 – HPC transplant   12. Day + 2 at 2200 - begin CTX hydration (0.45NS + 10 mEq KCl/ @150ml /m2  continue 24 hours post last dose of CTX   13. Day + 3 - + 4 - CTX 50mg/kg/day IV. Begin CTX when urine SG < 1.010. EKG daily. Mesna for hemorraghic cystitis ppx.   14. Day + 5 - start Zarxio,  MMF and Tacrolimus. Check Tacrolimus levels on Monday and Thursday.   15. Anxiolytics, antinausea, antidiarrhea medications as needed   16. Lasix PRN while being aggressively hydrated to avoid VOD   17. Nutritional support, pain management  Need for prophylactic measures:  1. VOD prophylaxis - low dose heparin gtt (dosed at 100 units / kg / day), glutamine supplementation, Actigall BID   2. PCP prophylaxis - Bactrim DS through day -2    3. Antiviral prophylaxis - Continue Acyclovir   4. Antifungal prophylaxis- Diflucan 400 mg po daily.  5.. GI prophylaxis - Protonix po QD   6. Antibacterial prophylaxis - when ANC < 500, start Cipro 500mg po BID. If becomes febrile, pan cx, CXR and change Cipro to Cefepime 2g IV q 8 hours. Continue until count recovery  7. Aggressive mouth care and skin care as per protocol  8. GVHD prophylaxis- high dose CTX; MMF and Tacrolimus.    Patient with hemoglobin drop of ~2  on 6/4, likely due to chemotherapy however will repeat CBC tonight to establish stability, likely will require transfusion.  6/5 describes nausea 1. Admit to BMTU Today is day - 2.  2. TLC placement in IR   3. Day -6 - day + 5 - antiemetics   4. Day - 6 start CTX hydration - 1/2NS @ 200ml /hr    5. Strict I&O, daily weights, prn diuresis   6. Day -6 - day -2 - Fludarabine 30mg/m2 IV  7. Day -6 - day - 5 - CTX 14.5mg/kg/day IV. CTX to start after urine SG < 1.010. Mesna  12mg / kg - hemorrhagic cystitis ppx   8. Day -2 - IVIG 0.4 g/kg (ideal body weight) every 3 weeks. Premedication with Tylenol and benadryl  9. Day -1 -  cGy x 1 dose   10. Day - 1 - at 2200 begin transplant hydration : 0.45Ns + 1 amp (50mEq) sodium bicarbonate at 150ml /hr; continue transplant hydration for 24 hours post infusion   11. Day 0 – HPC transplant   12. Day + 2 at 2200 - begin CTX hydration (0.45NS + 10 mEq KCl/ @150ml /m2  continue 24 hours post last dose of CTX   13. Day + 3 - + 4 - CTX 50mg/kg/day IV. Begin CTX when urine SG < 1.010. EKG daily. Mesna for hemorraghic cystitis ppx.   14. Day + 5 - start Zarxio,  MMF and Tacrolimus. Check Tacrolimus levels on Monday and Thursday.   15. Anxiolytics, antinausea, antidiarrhea medications as needed   16. Lasix PRN while being aggressively hydrated to avoid VOD   17. Nutritional support, pain management  Need for prophylactic measures:  1. VOD prophylaxis - low dose heparin gtt (dosed at 100 units / kg / day), glutamine supplementation, Actigall BID   2. PCP prophylaxis - Bactrim DS through day -2    3. Antiviral prophylaxis - Continue Acyclovir   4. Antifungal prophylaxis- Diflucan 400 mg po daily.  5.. GI prophylaxis - Protonix po QD   6. Antibacterial prophylaxis - when ANC < 500, start Cipro 500mg po BID. If becomes febrile, pan cx, CXR and change Cipro to Cefepime 2g IV q 8 hours. Continue until count recovery  7. Aggressive mouth care and skin care as per protocol  8. GVHD prophylaxis- high dose CTX; MMF and Tacrolimus.    Patient with hemoglobin drop of ~2  on 6/4, likely due to chemotherapy however will repeat CBC tonight to establish stability, likely will require transfusion.  6/5 describes nausea

## 2023-06-06 NOTE — PROGRESS NOTE ADULT - SUBJECTIVE AND OBJECTIVE BOX
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 () with Flu / Cy / TBI prep regimen for treatment of severe aplastic anemia    S: Patient seen and examined with HPC Transplant Team:       O: Vitals:   Vital Signs Last 24 Hrs  T(C): 37.2 (2023 06:30), Max: 37.2 (2023 06:30)  T(F): 99 (2023 06:30), Max: 99 (2023 06:30)  HR: 103 (2023 06:30) (85 - 103)  BP: 109/69 (2023 06:30) (98/58 - 137/80)  BP(mean): --  RR: 18 (2023 06:30) (18 - 18)  SpO2: 98% (2023 06:30) (97% - 99%)    Parameters below as of 2023 06:30  Patient On (Oxygen Delivery Method): room air      Admit weight: 102.8kg   Daily Weight in k.6 (2023 08:45)  Today's weight:     Intake / Output:   06-05 @ 07:01  -  06-06 @ 07:00  --------------------------------------------------------  IN: 2495.4 mL / OUT: 2925 mL / NET: -429.6 mL      PE:   Oropharynx: Clear  Oral Mucositis: (-)                                                        Grade:   CVS: RRR, +S1,S2  Lungs: CTA B/L  Abdomen: Soft, NT, ND, +BS  Extremities: No edema in BLE  Gastric Mucositis: (-)                                                 Grade:   Intestinal Mucositis:   (-)                                           Grade:   Skin: Intact  TLC: CDI  Neuro: Alert, Ox3  Pain: Denies    Labs:       Meds:   Antimicrobials:   acyclovir   Oral Tab/Cap 400 milliGRAM(s) Oral every 8 hours  clotrimazole Lozenge 1 Lozenge Oral five times a day  fluconAZOLE   Tablet 400 milliGRAM(s) Oral daily  trimethoprim  160 mG/sulfamethoxazole 800 mG 1 Tablet(s) Oral every 12 hours      Heme / Onc:   fludarabine IVPB (eMAR) 58 milliGRAM(s) IV Intermittent every 24 hours  heparin  Infusion 424 Unit(s)/Hr IV Continuous <Continuous>      GI:  famotidine    Tablet 20 milliGRAM(s) Oral two times a day  senna 1 Tablet(s) Oral at bedtime PRN  sodium bicarbonate Mouth Rinse 10 milliLiter(s) Swish and Spit five times a day  ursodiol Capsule 300 milliGRAM(s) Oral two times a day      Cardiovascular:       Immunologic:   immune   globulin 10% (GAMMAGARD) IVPB 30 Gram(s) IV Intermittent once      Other medications:   acetaminophen     Tablet .. 650 milliGRAM(s) Oral every 6 hours  acetaminophen   Tablet (Chemo) 650 milliGRAM(s) Oral once  aprepitant (Chemo) 80 milliGRAM(s) Oral every 24 hours  Biotene Dry Mouth Oral Rinse 5 milliLiter(s) Swish and Spit five times a day  Chemotherapy Worklist Order      chlorhexidine 4% Liquid 1 Application(s) Topical <User Schedule>  diphenhydrAMINE  Injectable (Chemo) 25 milliGRAM(s) IV Push once  folic acid 1 milliGRAM(s) Oral daily  Infuse HPC Product      multivitamin 1 Tablet(s) Oral daily  ondansetron Injectable (Chemo) 8 milliGRAM(s) IV Push every 8 hours  sodium chloride 0.9%. 1000 milliLiter(s) IV Continuous <Continuous>  sodium chloride 0.9%. (Chemo) 1000 milliLiter(s) IV Continuous <Continuous>      PRN:   acetaminophen     Tablet .. 650 milliGRAM(s) Oral every 6 hours PRN  LORazepam   Injectable 1 milliGRAM(s) IV Push every 6 hours PRN  metoclopramide Injectable 10 milliGRAM(s) IV Push every 6 hours PRN  senna 1 Tablet(s) Oral at bedtime PRN  sodium chloride 0.9% lock flush 10 milliLiter(s) IV Push every 1 hour PRN    A/P:  37 year old male with severe aplastic anemia with multiple lines of treatment admitted for a haplo-identical bone marrow transplant with  Flu / Cy / TBI preparative regimen   Day -  2  start CTX hydration - 1/2NS @ 200ml /hr    Strict I&O, daily weights, prn diuresis   Day -6 - day -2 - Fludarabine 30mg/m2 IV  Day -6 - day - 5 - CTX 14.5mg/kg/day IV. CTX to start after urine SG < 1.010. Mesna  12mg / kg - hemorrhagic cystitis ppx   Day -2 - IVIG 0.4 g/kg (ideal body weight) every 3 weeks. Premedication with Tylenol and benadryl   Day -1 -  cGy x 1 dose   Day - 1 - at 2200 begin transplant hydration : 0.45Ns + 1 amp (50mEq) sodium bicarbonate at 150ml /hr; continue transplant hydration for 24 hours post infusion    Day + 2 at 2200 - begin CTX hydration (0.45NS + 10 mEq KCl/ @150ml /m2  continue 24 hours post last dose of CTX   Day + 3 - + 4 - CTX 50mg/kg/day IV. Begin CTX when urine SG < 1.010. EKG daily. Mesna for hemorraghic cystitis ppx.   Day + 5 - start Zarxio,  MMF and Tacrolimus. Check Tacrolimus levels on Monday and Thursday.   when ANC < 500, start Cipro 500mg po BID. If becomes febrile, pan cx, CXR and change Cipro to Cefepime 2g IV q 8 hours. Continue until count recovery  6/5- Fludarabine , intermittent nausea, continue antiemetics   6- Fludarabine /5, IVIG today     1. Infectious Disease:   acyclovir   Oral Tab/Cap 400 milliGRAM(s) Oral every 8 hours  clotrimazole Lozenge 1 Lozenge Oral five times a day  fluconAZOLE   Tablet 400 milliGRAM(s) Oral daily  trimethoprim  160 mG/sulfamethoxazole 800 mG 1 Tablet(s) Oral every 12 hours    2. VOD Prophylaxis: Actigall, Glutamine, Heparin (dosed at 100 units / kg / day)     3. GI Prophylaxis:    famotidine    Tablet 20 milliGRAM(s) Oral two times a day    4. Mouthcare - NS / NaHCO3 rinses, Mycelex, Biotene; Skin care     5. GVHD prophylaxis   TBI day -1   CTX days +3, +4  MMF, tacrolimus to start day + 5     6. Transfuse & replete electrolytes prn     7. IV hydration, daily weights, strict I&O, prn diuresis     8. PO intake as tolerated, nutrition follow up as needed, MVI, folic acid     9. Antiemetics, anti-diarrhea medications:   ondansetron Injectable (Chemo) 8 milliGRAM(s) IV Push every 8 hours  LORazepam   Injectable 1 milliGRAM(s) IV Push every 6 hours PRN  metoclopramide Injectable 10 milliGRAM(s) IV Push every 6 hours PRN    10. OOB as tolerated, physical therapy consult if needed     11. Monitor coags / fibrinogen 2x week, vitamin K as needed     12. Monitor closely for clinical changes, monitor for fevers     13. Emotional support provided, plan of care discussed and questions addressed     14. Patient education done regarding chemotherapy prep, plan of care, restrictions and discharge planning     15. Continue regular social work input     I have written the above note for Dr. Wilder performed service with me in the room.   Mila De Guzman NP-C ([phone])    I have seen and examined patient with NP, I agree with above note as scribed.                    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 () with Flu / Cy / TBI prep regimen for treatment of severe aplastic anemia    S: Patient seen and examined with HPC Transplant Team:   + fatigue   + nausea     O: Vitals:   Vital Signs Last 24 Hrs  T(C): 37.2 (2023 06:30), Max: 37.2 (2023 06:30)  T(F): 99 (2023 06:30), Max: 99 (2023 06:30)  HR: 103 (2023 06:30) (85 - 103)  BP: 109/69 (2023 06:30) (98/58 - 137/80)  BP(mean): --  RR: 18 (2023 06:30) (18 - 18)  SpO2: 98% (2023 06:30) (97% - 99%)    Parameters below as of 2023 06:30  Patient On (Oxygen Delivery Method): room air      Admit weight: 102.8kg   Daily Weight in k.6 (2023 08:45)  Today's weight: 100kg    Intake / Output:   -05 @ 07:01  -  -06 @ 07:00  --------------------------------------------------------  IN: 2495.4 mL / OUT: 2925 mL / NET: -429.6 mL      PE:   Oropharynx: Clear  Oral Mucositis: (-)                                                        Grade:   CVS: RRR, +S1,S2  Lungs: CTA B/L  Abdomen: Soft, NT, ND, +BS  Extremities: No edema in BLE  Gastric Mucositis: (-)                                                 Grade:   Intestinal Mucositis:   (-)                                           Grade:   Skin: Intact  TLC: CDI  Neuro: Alert, Ox3  Pain: Denies    Labs:       Meds:   Antimicrobials:   acyclovir   Oral Tab/Cap 400 milliGRAM(s) Oral every 8 hours  clotrimazole Lozenge 1 Lozenge Oral five times a day  fluconAZOLE   Tablet 400 milliGRAM(s) Oral daily  trimethoprim  160 mG/sulfamethoxazole 800 mG 1 Tablet(s) Oral every 12 hours      Heme / Onc:   fludarabine IVPB (eMAR) 58 milliGRAM(s) IV Intermittent every 24 hours  heparin  Infusion 424 Unit(s)/Hr IV Continuous <Continuous>      GI:  famotidine    Tablet 20 milliGRAM(s) Oral two times a day  senna 1 Tablet(s) Oral at bedtime PRN  sodium bicarbonate Mouth Rinse 10 milliLiter(s) Swish and Spit five times a day  ursodiol Capsule 300 milliGRAM(s) Oral two times a day      Cardiovascular:       Immunologic:   immune   globulin 10% (GAMMAGARD) IVPB 30 Gram(s) IV Intermittent once      Other medications:   acetaminophen     Tablet .. 650 milliGRAM(s) Oral every 6 hours  acetaminophen   Tablet (Chemo) 650 milliGRAM(s) Oral once  aprepitant (Chemo) 80 milliGRAM(s) Oral every 24 hours  Biotene Dry Mouth Oral Rinse 5 milliLiter(s) Swish and Spit five times a day  Chemotherapy Worklist Order      chlorhexidine 4% Liquid 1 Application(s) Topical <User Schedule>  diphenhydrAMINE  Injectable (Chemo) 25 milliGRAM(s) IV Push once  folic acid 1 milliGRAM(s) Oral daily  Infuse HPC Product      multivitamin 1 Tablet(s) Oral daily  ondansetron Injectable (Chemo) 8 milliGRAM(s) IV Push every 8 hours  sodium chloride 0.9%. 1000 milliLiter(s) IV Continuous <Continuous>  sodium chloride 0.9%. (Chemo) 1000 milliLiter(s) IV Continuous <Continuous>      PRN:   acetaminophen     Tablet .. 650 milliGRAM(s) Oral every 6 hours PRN  LORazepam   Injectable 1 milliGRAM(s) IV Push every 6 hours PRN  metoclopramide Injectable 10 milliGRAM(s) IV Push every 6 hours PRN  senna 1 Tablet(s) Oral at bedtime PRN  sodium chloride 0.9% lock flush 10 milliLiter(s) IV Push every 1 hour PRN    A/P:  37 year old male with severe aplastic anemia with multiple lines of treatment admitted for a haplo-identical bone marrow transplant with  Flu / Cy / TBI preparative regimen   Day -  2  start CTX hydration - 1/2NS @ 200ml /hr    Strict I&O, daily weights, prn diuresis   Day -6 - day -2 - Fludarabine 30mg/m2 IV  Day -6 - day - 5 - CTX 14.5mg/kg/day IV. CTX to start after urine SG < 1.010. Mesna  12mg / kg - hemorrhagic cystitis ppx   Day -2 - IVIG 0.4 g/kg (ideal body weight) every 3 weeks. Premedication with Tylenol and benadryl   Day -1 -  cGy x 1 dose   Day - 1 - at 2200 begin transplant hydration : 0.45Ns + 1 amp (50mEq) sodium bicarbonate at 150ml /hr; continue transplant hydration for 24 hours post infusion    Day + 2 at 2200 - begin CTX hydration (0.45NS + 10 mEq KCl/ @150ml /m2  continue 24 hours post last dose of CTX   Day + 3 - + 4 - CTX 50mg/kg/day IV. Begin CTX when urine SG < 1.010. EKG daily. Mesna for hemorraghic cystitis ppx.   Day + 5 - start Zarxio,  MMF and Tacrolimus. Check Tacrolimus levels on Monday and Thursday.   when ANC < 500, start Cipro 500mg po BID. If becomes febrile, pan cx, CXR and change Cipro to Cefepime 2g IV q 8 hours. Continue until count recovery  6/5- Fludarabine , intermittent nausea, continue antiemetics   6- Fludarabine /5, IVIG today     1. Infectious Disease:   acyclovir   Oral Tab/Cap 400 milliGRAM(s) Oral every 8 hours  clotrimazole Lozenge 1 Lozenge Oral five times a day  fluconAZOLE   Tablet 400 milliGRAM(s) Oral daily  trimethoprim  160 mG/sulfamethoxazole 800 mG 1 Tablet(s) Oral every 12 hours    2. VOD Prophylaxis: Actigall, Glutamine, Heparin (dosed at 100 units / kg / day)     3. GI Prophylaxis:    famotidine    Tablet 20 milliGRAM(s) Oral two times a day    4. Mouthcare - NS / NaHCO3 rinses, Mycelex, Biotene; Skin care     5. GVHD prophylaxis   TBI day -1   CTX days +3, +4  MMF, tacrolimus to start day + 5     6. Transfuse & replete electrolytes prn     7. IV hydration, daily weights, strict I&O, prn diuresis     8. PO intake as tolerated, nutrition follow up as needed, MVI, folic acid     9. Antiemetics, anti-diarrhea medications:   ondansetron Injectable (Chemo) 8 milliGRAM(s) IV Push every 8 hours  LORazepam   Injectable 1 milliGRAM(s) IV Push every 6 hours PRN  metoclopramide Injectable 10 milliGRAM(s) IV Push every 6 hours PRN    10. OOB as tolerated, physical therapy consult if needed     11. Monitor coags / fibrinogen 2x week, vitamin K as needed     12. Monitor closely for clinical changes, monitor for fevers     13. Emotional support provided, plan of care discussed and questions addressed     14. Patient education done regarding chemotherapy prep, plan of care, restrictions and discharge planning     15. Continue regular social work input     I have written the above note for Dr. Wilder performed service with me in the room.   Mila De Guzman NP-C ([phone])    I have seen and examined patient with NP, I agree with above note as scribed.                    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 () with Flu / Cy / TBI prep regimen for treatment of severe aplastic anemia    S: Patient seen and examined with [facility]PC Transplant Team:   + fatigue   + nausea   + runny nose this am    O: Vitals:   Vital Signs Last 24 Hrs  T(C): 37.2 (2023 06:30), Max: 37.2 (2023 06:30)  T(F): 99 (2023 06:30), Max: 99 (2023 06:30)  HR: 103 (2023 06:30) (85 - 103)  BP: 109/69 (2023 06:30) (98/58 - 137/80)  RR: 18 (2023 06:30) (18 - 18)  SpO2: 98% (2023 06:30) (97% - 99%)    Parameters below as of 2023 06:30  Patient On (Oxygen Delivery Method): room air      Admit weight: 102.8kg   Daily Weight in k.6 (2023 08:45)  Today's weight: 100kg    Intake / Output:   06-05 @ 07:01  -  06-06 @ 07:00  --------------------------------------------------------  IN: 2495.4 mL / OUT: 2925 mL / NET: -429.6 mL      PE:   Oropharynx: Clear  Oral Mucositis: (-)                                                        Grade:   CVS: RRR, +S1,S2  Lungs: CTA B/L  Abdomen: Soft, NT, ND, +BS  Extremities: No edema in BLE  Gastric Mucositis: (-)                                                 Grade:   Intestinal Mucositis:   (-)                                           Grade:   Skin: Intact  TLC: CDI  Neuro: Alert, Ox3  Pain: Denies    Labs:                       7.6    0.89  )-----------( 152      ( 2023 07:05 )             21.9     2023 07:03    137    |  104    |  13     ----------------------------<  98     4.0     |  21     |  0.94     Ca    8.7        2023 07:03  Phos  3.0       2023 07:03  Mg     2.3       2023 07:03    TPro  7.0    /  Alb  4.3    /  TBili  2.0    /  DBili  x      /  AST  38     /  ALT  54     /  AlkPhos  93     2023 07:03    PT/INR - ( 2023 07:13 )   PT: 13.2 sec;   INR: 1.14 ratio    PTT - ( 2023 07:13 )  PTT:26.4 sec    LIVER FUNCTIONS - ( 2023 07:03 )  Alb: 4.3 g/dL / Pro: 7.0 g/dL / ALK PHOS: 93 U/L / ALT: 54 U/L / AST: 38 U/L / GGT: x           Meds:   Antimicrobials:   acyclovir   Oral Tab/Cap 400 milliGRAM(s) Oral every 8 hours  clotrimazole Lozenge 1 Lozenge Oral five times a day  fluconAZOLE   Tablet 400 milliGRAM(s) Oral daily  trimethoprim  160 mG/sulfamethoxazole 800 mG 1 Tablet(s) Oral every 12 hours      Heme / Onc:   fludarabine IVPB (eMAR) 58 milliGRAM(s) IV Intermittent every 24 hours  heparin  Infusion 424 Unit(s)/Hr IV Continuous <Continuous>      GI:  famotidine    Tablet 20 milliGRAM(s) Oral two times a day  senna 1 Tablet(s) Oral at bedtime PRN  sodium bicarbonate Mouth Rinse 10 milliLiter(s) Swish and Spit five times a day  ursodiol Capsule 300 milliGRAM(s) Oral two times a day    Immunologic:   immune   globulin 10% (GAMMAGARD) IVPB 30 Gram(s) IV Intermittent once      Other medications:   acetaminophen     Tablet .. 650 milliGRAM(s) Oral every 6 hours  acetaminophen   Tablet (Chemo) 650 milliGRAM(s) Oral once  aprepitant (Chemo) 80 milliGRAM(s) Oral every 24 hours  Biotene Dry Mouth Oral Rinse 5 milliLiter(s) Swish and Spit five times a day  Chemotherapy Worklist Order      chlorhexidine 4% Liquid 1 Application(s) Topical <User Schedule>  diphenhydrAMINE  Injectable (Chemo) 25 milliGRAM(s) IV Push once  folic acid 1 milliGRAM(s) Oral daily  Infuse HPC Product      multivitamin 1 Tablet(s) Oral daily  ondansetron Injectable (Chemo) 8 milliGRAM(s) IV Push every 8 hours  sodium chloride 0.9%. 1000 milliLiter(s) IV Continuous <Continuous>  sodium chloride 0.9%. (Chemo) 1000 milliLiter(s) IV Continuous <Continuous>      PRN:   acetaminophen     Tablet .. 650 milliGRAM(s) Oral every 6 hours PRN  LORazepam   Injectable 1 milliGRAM(s) IV Push every 6 hours PRN  metoclopramide Injectable 10 milliGRAM(s) IV Push every 6 hours PRN  senna 1 Tablet(s) Oral at bedtime PRN  sodium chloride 0.9% lock flush 10 milliLiter(s) IV Push every 1 hour PRN    A/P:  37 year old male with severe aplastic anemia with multiple lines of treatment admitted for a haplo-identical bone marrow transplant with  Flu / Cy / TBI preparative regimen   Day -  2  start CTX hydration - 1/2NS @ 200ml /hr    Strict I&O, daily weights, prn diuresis   Day -6 - day -2 - Fludarabine 30mg/m2 IV  Day -6 - day - 5 - CTX 14.5mg/kg/day IV. CTX to start after urine SG < 1.010. Mesna  12mg / kg - hemorrhagic cystitis ppx   Day -2 - IVIG 0.4 g/kg (ideal body weight) every 3 weeks. Premedication with Tylenol and benadryl   Day -1 -  cGy x 1 dose   Day - 1 - at 2200 begin transplant hydration : 0.45Ns + 1 amp (50mEq) sodium bicarbonate at 150ml /hr; continue transplant hydration for 24 hours post infusion    Day + 2 at 2200 - begin CTX hydration (0.45NS + 10 mEq KCl/ @150ml /m2  continue 24 hours post last dose of CTX   Day + 3 - + 4 - CTX 50mg/kg/day IV. Begin CTX when urine SG < 1.010. EKG daily. Mesna for hemorraghic cystitis ppx.   Day + 5 - start Zarxio,  MMF and Tacrolimus. Check Tacrolimus levels on Monday and Thursday.   when ANC < 500, start Cipro 500mg po BID. If becomes febrile, pan cx, CXR and change Cipro to Cefepime 2g IV q 8 hours. Continue until count recovery  6/- Fludarabine , intermittent nausea, continue antiemetics   6- Fludarabine , IVIG today     1. Infectious Disease:   acyclovir   Oral Tab/Cap 400 milliGRAM(s) Oral every 8 hours  clotrimazole Lozenge 1 Lozenge Oral five times a day  fluconAZOLE   Tablet 400 milliGRAM(s) Oral daily  trimethoprim  160 mG/sulfamethoxazole 800 mG 1 Tablet(s) Oral every 12 hours    2. VOD Prophylaxis: Actigall, Glutamine, Heparin (dosed at 100 units / kg / day)     3. GI Prophylaxis:    famotidine    Tablet 20 milliGRAM(s) Oral two times a day    4. Mouthcare - NS / NaHCO3 rinses, Mycelex, Biotene; Skin care     5. GVHD prophylaxis   TBI day -1   CTX days +3, +4  MMF, tacrolimus to start day + 5     6. Transfuse & replete electrolytes prn     7. IV hydration, daily weights, strict I&O, prn diuresis     8. PO intake as tolerated, nutrition follow up as needed, MVI, folic acid     9. Antiemetics, anti-diarrhea medications:   ondansetron Injectable (Chemo) 8 milliGRAM(s) IV Push every 8 hours  LORazepam   Injectable 1 milliGRAM(s) IV Push every 6 hours PRN  metoclopramide Injectable 10 milliGRAM(s) IV Push every 6 hours PRN    10. OOB as tolerated, physical therapy consult if needed     11. Monitor coags / fibrinogen 2x week, vitamin K as needed     12. Monitor closely for clinical changes, monitor for fevers     13. Emotional support provided, plan of care discussed and questions addressed     14. Patient education done regarding chemotherapy prep, plan of care, restrictions and discharge planning     15. Continue regular social work input     I have written the above note for Dr. Wilder performed service with me in the room.   Mila De Guzman NP-C ([phone])    I have seen and examined patient with NP, I agree with above note as scribed.

## 2023-06-07 ENCOUNTER — OUTPATIENT (OUTPATIENT)
Dept: OUTPATIENT SERVICES | Facility: HOSPITAL | Age: 38
LOS: 1 days | Discharge: HOME | End: 2023-06-07
Payer: COMMERCIAL

## 2023-06-07 DIAGNOSIS — Z98.890 OTHER SPECIFIED POSTPROCEDURAL STATES: Chronic | ICD-10-CM

## 2023-06-07 LAB
ALBUMIN SERPL ELPH-MCNC: 4.5 G/DL — SIGNIFICANT CHANGE UP (ref 3.3–5)
ALP SERPL-CCNC: 91 U/L — SIGNIFICANT CHANGE UP (ref 40–120)
ALT FLD-CCNC: 63 U/L — HIGH (ref 10–45)
ANION GAP SERPL CALC-SCNC: 9 MMOL/L — SIGNIFICANT CHANGE UP (ref 5–17)
ANISOCYTOSIS BLD QL: SLIGHT — SIGNIFICANT CHANGE UP
AST SERPL-CCNC: 39 U/L — SIGNIFICANT CHANGE UP (ref 10–40)
BASOPHILS # BLD AUTO: 0 K/UL — SIGNIFICANT CHANGE UP (ref 0–0.2)
BASOPHILS NFR BLD AUTO: 0 % — SIGNIFICANT CHANGE UP (ref 0–2)
BILIRUB DIRECT SERPL-MCNC: 0.2 MG/DL — SIGNIFICANT CHANGE UP (ref 0–0.3)
BILIRUB INDIRECT FLD-MCNC: 1 MG/DL — SIGNIFICANT CHANGE UP (ref 0.2–1)
BILIRUB SERPL-MCNC: 1.2 MG/DL — SIGNIFICANT CHANGE UP (ref 0.2–1.2)
BLD GP AB SCN SERPL QL: NEGATIVE — SIGNIFICANT CHANGE UP
BUN SERPL-MCNC: 13 MG/DL — SIGNIFICANT CHANGE UP (ref 7–23)
CALCIUM SERPL-MCNC: 8.9 MG/DL — SIGNIFICANT CHANGE UP (ref 8.4–10.5)
CD3 CELLS # SPEC: 1554 CELLS/UL — SIGNIFICANT CHANGE UP
CD3 CELLS # SPEC: 1569 CELLS/UL — SIGNIFICANT CHANGE UP
CD3 VIABILILTY: 98 % — SIGNIFICANT CHANGE UP
CD3 VIABILILTY: 99 % — SIGNIFICANT CHANGE UP
CD34 CELLS # FLD: 104 /UL — SIGNIFICANT CHANGE UP
CD34 CELLS # FLD: 82 /UL — SIGNIFICANT CHANGE UP
CD34 VIABILITY: 96 % — SIGNIFICANT CHANGE UP
CD34 VIABILITY: 98 % — SIGNIFICANT CHANGE UP
CHLORIDE SERPL-SCNC: 104 MMOL/L — SIGNIFICANT CHANGE UP (ref 96–108)
CO2 SERPL-SCNC: 22 MMOL/L — SIGNIFICANT CHANGE UP (ref 22–31)
CREAT SERPL-MCNC: 0.9 MG/DL — SIGNIFICANT CHANGE UP (ref 0.5–1.3)
DACRYOCYTES BLD QL SMEAR: SLIGHT — SIGNIFICANT CHANGE UP
EGFR: 114 ML/MIN/1.73M2 — SIGNIFICANT CHANGE UP
EOSINOPHIL # BLD AUTO: 0.07 K/UL — SIGNIFICANT CHANGE UP (ref 0–0.5)
EOSINOPHIL NFR BLD AUTO: 11.1 % — HIGH (ref 0–6)
G6PD RBC-CCNC: 14.4 U/G HGB — SIGNIFICANT CHANGE UP (ref 7–20.5)
GLUCOSE SERPL-MCNC: 93 MG/DL — SIGNIFICANT CHANGE UP (ref 70–99)
GRAM STN FLD: SIGNIFICANT CHANGE UP
GRAM STN FLD: SIGNIFICANT CHANGE UP
HCT VFR BLD CALC: 20.3 % — CRITICAL LOW (ref 39–50)
HGB BLD-MCNC: 6.9 G/DL — CRITICAL LOW (ref 13–17)
LDH SERPL L TO P-CCNC: 281 U/L — HIGH (ref 50–242)
LYMPHOCYTES # BLD AUTO: 0.02 K/UL — LOW (ref 1–3.3)
LYMPHOCYTES # BLD AUTO: 4 % — LOW (ref 13–44)
MACROCYTES BLD QL: SLIGHT — SIGNIFICANT CHANGE UP
MAGNESIUM SERPL-MCNC: 2.3 MG/DL — SIGNIFICANT CHANGE UP (ref 1.6–2.6)
MANUAL SMEAR VERIFICATION: SIGNIFICANT CHANGE UP
MCHC RBC-ENTMCNC: 34 GM/DL — SIGNIFICANT CHANGE UP (ref 32–36)
MCHC RBC-ENTMCNC: 34 PG — SIGNIFICANT CHANGE UP (ref 27–34)
MCV RBC AUTO: 100 FL — SIGNIFICANT CHANGE UP (ref 80–100)
METAMYELOCYTES # FLD: 1 % — HIGH (ref 0–0)
MONOCYTES # BLD AUTO: 0.04 K/UL — SIGNIFICANT CHANGE UP (ref 0–0.9)
MONOCYTES NFR BLD AUTO: 7.1 % — SIGNIFICANT CHANGE UP (ref 2–14)
NEUTROPHILS # BLD AUTO: 0.46 K/UL — LOW (ref 1.8–7.4)
NEUTROPHILS NFR BLD AUTO: 70.7 % — SIGNIFICANT CHANGE UP (ref 43–77)
NEUTS BAND # BLD: 6.1 % — SIGNIFICANT CHANGE UP (ref 0–8)
NRBC # BLD: 1 /100 — HIGH (ref 0–0)
PHOSPHATE SERPL-MCNC: 2.8 MG/DL — SIGNIFICANT CHANGE UP (ref 2.5–4.5)
PLAT MORPH BLD: NORMAL — SIGNIFICANT CHANGE UP
PLATELET # BLD AUTO: 148 K/UL — LOW (ref 150–400)
POIKILOCYTOSIS BLD QL AUTO: SLIGHT — SIGNIFICANT CHANGE UP
POTASSIUM SERPL-MCNC: 3.9 MMOL/L — SIGNIFICANT CHANGE UP (ref 3.5–5.3)
POTASSIUM SERPL-SCNC: 3.9 MMOL/L — SIGNIFICANT CHANGE UP (ref 3.5–5.3)
PROT SERPL-MCNC: 7.4 G/DL — SIGNIFICANT CHANGE UP (ref 6–8.3)
RBC # BLD: 2.03 M/UL — LOW (ref 4.2–5.8)
RBC # FLD: 18.6 % — HIGH (ref 10.3–14.5)
RBC BLD AUTO: ABNORMAL
RH IG SCN BLD-IMP: POSITIVE — SIGNIFICANT CHANGE UP
SODIUM SERPL-SCNC: 135 MMOL/L — SIGNIFICANT CHANGE UP (ref 135–145)
SPECIMEN SOURCE: SIGNIFICANT CHANGE UP
SPECIMEN SOURCE: SIGNIFICANT CHANGE UP
WBC # BLD: 0.6 K/UL — CRITICAL LOW (ref 3.8–10.5)
WBC # FLD AUTO: 0.6 K/UL — CRITICAL LOW (ref 3.8–10.5)

## 2023-06-07 PROCEDURE — 99291 CRITICAL CARE FIRST HOUR: CPT

## 2023-06-07 PROCEDURE — 77331 SPECIAL RADIATION DOSIMETRY: CPT | Mod: 26

## 2023-06-07 RX ORDER — ACETAMINOPHEN 500 MG
650 TABLET ORAL EVERY 12 HOURS
Refills: 0 | Status: DISCONTINUED | OUTPATIENT
Start: 2023-06-07 | End: 2023-06-07

## 2023-06-07 RX ORDER — DIPHENHYDRAMINE HCL 50 MG
25 CAPSULE ORAL ONCE
Refills: 0 | Status: COMPLETED | OUTPATIENT
Start: 2023-06-07 | End: 2023-06-07

## 2023-06-07 RX ORDER — DIPHENHYDRAMINE HCL 50 MG
25 CAPSULE ORAL EVERY 12 HOURS
Refills: 0 | Status: DISCONTINUED | OUTPATIENT
Start: 2023-06-07 | End: 2023-06-12

## 2023-06-07 RX ADMIN — Medication 5 MILLILITER(S): at 20:09

## 2023-06-07 RX ADMIN — Medication 1 TABLET(S): at 12:28

## 2023-06-07 RX ADMIN — Medication 5 MILLILITER(S): at 16:55

## 2023-06-07 RX ADMIN — Medication 500 MILLIGRAM(S): at 05:14

## 2023-06-07 RX ADMIN — Medication 10 MILLILITER(S): at 10:08

## 2023-06-07 RX ADMIN — Medication 400 MILLIGRAM(S): at 05:14

## 2023-06-07 RX ADMIN — Medication 5 MILLILITER(S): at 12:27

## 2023-06-07 RX ADMIN — Medication 650 MILLIGRAM(S): at 10:38

## 2023-06-07 RX ADMIN — Medication 10 MILLILITER(S): at 20:10

## 2023-06-07 RX ADMIN — CHLORHEXIDINE GLUCONATE 1 APPLICATION(S): 213 SOLUTION TOPICAL at 10:11

## 2023-06-07 RX ADMIN — Medication 10 MILLILITER(S): at 12:28

## 2023-06-07 RX ADMIN — Medication 500 MILLIGRAM(S): at 17:32

## 2023-06-07 RX ADMIN — ONDANSETRON 8 MILLIGRAM(S): 8 TABLET, FILM COATED ORAL at 21:16

## 2023-06-07 RX ADMIN — Medication 5 MILLILITER(S): at 00:09

## 2023-06-07 RX ADMIN — Medication 1 LOZENGE: at 16:56

## 2023-06-07 RX ADMIN — APREPITANT 80 MILLIGRAM(S): 80 CAPSULE ORAL at 12:30

## 2023-06-07 RX ADMIN — Medication 650 MILLIGRAM(S): at 11:15

## 2023-06-07 RX ADMIN — Medication 400 MILLIGRAM(S): at 21:16

## 2023-06-07 RX ADMIN — Medication 1 LOZENGE: at 00:09

## 2023-06-07 RX ADMIN — Medication 1 LOZENGE: at 12:28

## 2023-06-07 RX ADMIN — Medication 10 MILLIGRAM(S): at 09:12

## 2023-06-07 RX ADMIN — Medication 1 LOZENGE: at 10:09

## 2023-06-07 RX ADMIN — FAMOTIDINE 20 MILLIGRAM(S): 10 INJECTION INTRAVENOUS at 05:14

## 2023-06-07 RX ADMIN — ONDANSETRON 8 MILLIGRAM(S): 8 TABLET, FILM COATED ORAL at 05:15

## 2023-06-07 RX ADMIN — URSODIOL 300 MILLIGRAM(S): 250 TABLET, FILM COATED ORAL at 05:14

## 2023-06-07 RX ADMIN — Medication 400 MILLIGRAM(S): at 15:02

## 2023-06-07 RX ADMIN — URSODIOL 300 MILLIGRAM(S): 250 TABLET, FILM COATED ORAL at 17:32

## 2023-06-07 RX ADMIN — HEPARIN SODIUM 4.24 UNIT(S)/HR: 5000 INJECTION INTRAVENOUS; SUBCUTANEOUS at 05:43

## 2023-06-07 RX ADMIN — FLUCONAZOLE 400 MILLIGRAM(S): 150 TABLET ORAL at 12:29

## 2023-06-07 RX ADMIN — Medication 10 MILLILITER(S): at 16:56

## 2023-06-07 RX ADMIN — Medication 1 LOZENGE: at 20:10

## 2023-06-07 RX ADMIN — Medication 5 MILLILITER(S): at 10:08

## 2023-06-07 RX ADMIN — FAMOTIDINE 20 MILLIGRAM(S): 10 INJECTION INTRAVENOUS at 17:32

## 2023-06-07 RX ADMIN — ONDANSETRON 8 MILLIGRAM(S): 8 TABLET, FILM COATED ORAL at 14:01

## 2023-06-07 RX ADMIN — SODIUM CHLORIDE 20 MILLILITER(S): 9 INJECTION INTRAMUSCULAR; INTRAVENOUS; SUBCUTANEOUS at 05:43

## 2023-06-07 RX ADMIN — Medication 25 MILLIGRAM(S): at 10:39

## 2023-06-07 RX ADMIN — Medication 10 MILLILITER(S): at 00:09

## 2023-06-07 RX ADMIN — Medication 1 MILLIGRAM(S): at 12:29

## 2023-06-07 RX ADMIN — Medication 0.07 MEQ/KG/HR: at 22:03

## 2023-06-07 NOTE — PROGRESS NOTE ADULT - SUBJECTIVE AND OBJECTIVE BOX
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 () with Flu / Cy / TBI prep regimen for treatment of severe aplastic anemia    S: Patient seen and examined with HPC Transplant Team:       O:     Vital Signs Last 24 Hrs  T(C): 37.2 (2023 05:30), Max: 37.4 (2023 21:24)  T(F): 99 (2023 05:30), Max: 99.3 (2023 21:24)  HR: 90 (2023 05:30) (83 - 101)  BP: 124/69 (2023 05:30) (101/60 - 130/71)  BP(mean): --  RR: 18 (2023 05:30) (17 - 18)  SpO2: 98% (2023 05:30) (97% - 100%)    Parameters below as of 2023 05:30  Patient On (Oxygen Delivery Method): room air        Admit weight: 102.8kg  Daily Weight in k (2023 10:09)    Intake / Output:    @ 07:01  -  06-07 @ 07:00  --------------------------------------------------------  IN: 2886.7 mL / OUT: 2700 mL / NET: 186.7 mL    PE:   Oropharynx: Clear  Oral Mucositis: (-)                                                        Grade:   CVS: RRR, +S1,S2  Lungs: CTA B/L  Abdomen: Soft, NT, ND, +BS  Extremities: No edema in BLE  Gastric Mucositis: (-)                                                 Grade:   Intestinal Mucositis:   (-)                                           Grade:   Skin: Intact  TLC: CDI  Neuro: Alert, Ox3  Pain: Denies        Labs:     --------      Cultures:         Radiology:       Meds:   Antimicrobials:   acyclovir   Oral Tab/Cap 400 milliGRAM(s) Oral every 8 hours  ciprofloxacin     Tablet 500 milliGRAM(s) Oral every 12 hours  clotrimazole Lozenge 1 Lozenge Oral five times a day  fluconAZOLE   Tablet 400 milliGRAM(s) Oral daily      Heme / Onc:   heparin  Infusion 424 Unit(s)/Hr IV Continuous <Continuous>      GI:  famotidine    Tablet 20 milliGRAM(s) Oral two times a day  senna 1 Tablet(s) Oral at bedtime PRN  sodium bicarbonate Mouth Rinse 10 milliLiter(s) Swish and Spit five times a day  ursodiol Capsule 300 milliGRAM(s) Oral two times a day      Cardiovascular:       Immunologic:       Other medications:   acetaminophen     Tablet .. 650 milliGRAM(s) Oral every 6 hours  aprepitant (Chemo) 80 milliGRAM(s) Oral every 24 hours  Biotene Dry Mouth Oral Rinse 5 milliLiter(s) Swish and Spit five times a day  Chemotherapy Worklist Order      chlorhexidine 4% Liquid 1 Application(s) Topical <User Schedule>  folic acid 1 milliGRAM(s) Oral daily  Infuse HPC Product      multivitamin 1 Tablet(s) Oral daily  ondansetron Injectable (Chemo) 8 milliGRAM(s) IV Push every 8 hours  sodium chloride 0.9%. 1000 milliLiter(s) IV Continuous <Continuous>  sodium chloride 0.9%. (Chemo) 1000 milliLiter(s) IV Continuous <Continuous>      PRN:   acetaminophen     Tablet .. 650 milliGRAM(s) Oral every 6 hours PRN  LORazepam   Injectable 1 milliGRAM(s) IV Push every 6 hours PRN  prochlorperazine   Injectable 10 milliGRAM(s) IntraMuscular every 6 hours PRN  senna 1 Tablet(s) Oral at bedtime PRN  sodium chloride 0.9% lock flush 10 milliLiter(s) IV Push every 1 hour PRN      A/P:  37 year old male with severe aplastic anemia with multiple lines of treatment admitted for a haplo-identical bone marrow transplant with  Flu / Cy / TBI preparative regimen   Day -  1  start CTX hydration - 1/2NS @ 200ml /hr    Strict I&O, daily weights, prn diuresis   Day -6 - day -2 - Fludarabine 30mg/m2 IV  Day -6 - day - 5 - CTX 14.5mg/kg/day IV. CTX to start after urine SG < 1.010. Mesna  12mg / kg - hemorrhagic cystitis ppx   Day -2 - IVIG 0.4 g/kg (ideal body weight) every 3 weeks. Premedication with Tylenol and benadryl   Day -1 -  cGy x 1 dose   Day - 1 - at 2200 begin transplant hydration : 0.45Ns + 1 amp (50mEq) sodium bicarbonate at 150ml /hr; continue transplant hydration for 24 hours post infusion    Day + 2 at 2200 - begin CTX hydration (0.45NS + 10 mEq KCl/ @150ml /m2  continue 24 hours post last dose of CTX   Day + 3 - + 4 - CTX 50mg/kg/day IV. Begin CTX when urine SG < 1.010. EKG daily. Mesna for hemorraghic cystitis ppx.   Day + 5 - start Zarxio,  MMF and Tacrolimus. Check Tacrolimus levels on Monday and Thursday.   when ANC < 500, start Cipro 500mg po BID. If becomes febrile, pan cx, CXR and change Cipro to Cefepime 2g IV q 8 hours. Continue until count recovery  - Fludarabine , intermittent nausea, continue antiemetics   - Fludarabine , IVIG today     1. Infectious Disease:   acyclovir   Oral Tab/Cap 400 milliGRAM(s) Oral every 8 hours  clotrimazole Lozenge 1 Lozenge Oral five times a day  fluconAZOLE   Tablet 400 milliGRAM(s) Oral daily  trimethoprim  160 mG/sulfamethoxazole 800 mG 1 Tablet(s) Oral every 12 hours    2. VOD Prophylaxis: Actigall, Glutamine, Heparin (dosed at 100 units / kg / day)     3. GI Prophylaxis:    famotidine    Tablet 20 milliGRAM(s) Oral two times a day    4. Mouthcare - NS / NaHCO3 rinses, Mycelex, Biotene; Skin care     5. GVHD prophylaxis   TBI day -1   CTX days +3, +4  MMF, tacrolimus to start day + 5     6. Transfuse & replete electrolytes prn     7. IV hydration, daily weights, strict I&O, prn diuresis     8. PO intake as tolerated, nutrition follow up as needed, MVI, folic acid     9. Antiemetics, anti-diarrhea medications:   ondansetron Injectable (Chemo) 8 milliGRAM(s) IV Push every 8 hours  LORazepam   Injectable 1 milliGRAM(s) IV Push every 6 hours PRN  metoclopramide Injectable 10 milliGRAM(s) IV Push every 6 hours PRN    10. OOB as tolerated, physical therapy consult if needed     11. Monitor coags / fibrinogen 2x week, vitamin K as needed     12. Monitor closely for clinical changes, monitor for fevers     13. Emotional support provided, plan of care discussed and questions addressed     14. Patient education done regarding chemotherapy prep, plan of care, restrictions and discharge planning     15. Continue regular social work input     I have written the above note for Dr. Wilder performed service with me in the room.   Axel Goncalves PA-C ([phone])    I have seen and examined patient with NP, I agree with above note as scribed.            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 () with Flu / Cy / TBI prep regimen for treatment of severe aplastic anemia    S: Patient seen and examined with [facility]PC Transplant Team:       O:     Vital Signs Last 24 Hrs  T(C): 37.2 (2023 05:30), Max: 37.4 (2023 21:24)  T(F): 99 (2023 05:30), Max: 99.3 (2023 21:24)  HR: 90 (2023 05:30) (83 - 101)  BP: 124/69 (2023 05:30) (101/60 - 130/71)  BP(mean): --  RR: 18 (2023 05:30) (17 - 18)  SpO2: 98% (2023 05:30) (97% - 100%)    Parameters below as of 2023 05:30  Patient On (Oxygen Delivery Method): room air        Admit weight: 102.8kg  Daily Weight in k (2023 10:09)    Intake / Output:   -06 @ 07:01  -  06-07 @ 07:00  --------------------------------------------------------  IN: 2886.7 mL / OUT: 2700 mL / NET: 186.7 mL    PE:   Oropharynx: Clear  Oral Mucositis: (-)                                                        Grade:   CVS: RRR, +S1,S2  Lungs: CTA B/L  Abdomen: Soft, NT, ND, +BS  Extremities: No edema in BLE  Gastric Mucositis: (-)                                                 Grade:   Intestinal Mucositis:   (-)                                           Grade:   Skin: Intact  TLC: CDI  Neuro: Alert, Ox3  Pain: Denies        Labs:                         6.9    0.60  )-----------( 148      ( 2023 06:52 )             20.3     06-07    135  |  104  |  13  ----------------------------<  93  3.9   |  22  |  0.90    Ca    8.9      2023 06:52  Phos  2.8       Mg     2.3         TPro  7.4  /  Alb  4.5  /  TBili  1.2  /  DBili  0.2  /  AST  39  /  ALT  63<H>  /  AlkPhos  91        Meds:   Antimicrobials:   acyclovir   Oral Tab/Cap 400 milliGRAM(s) Oral every 8 hours  ciprofloxacin     Tablet 500 milliGRAM(s) Oral every 12 hours  clotrimazole Lozenge 1 Lozenge Oral five times a day  fluconAZOLE   Tablet 400 milliGRAM(s) Oral daily      Heme / Onc:   heparin  Infusion 424 Unit(s)/Hr IV Continuous <Continuous>      GI:  famotidine    Tablet 20 milliGRAM(s) Oral two times a day  senna 1 Tablet(s) Oral at bedtime PRN  sodium bicarbonate Mouth Rinse 10 milliLiter(s) Swish and Spit five times a day  ursodiol Capsule 300 milliGRAM(s) Oral two times a day      Cardiovascular:       Immunologic:       Other medications:   acetaminophen     Tablet .. 650 milliGRAM(s) Oral every 6 hours  aprepitant (Chemo) 80 milliGRAM(s) Oral every 24 hours  Biotene Dry Mouth Oral Rinse 5 milliLiter(s) Swish and Spit five times a day  Chemotherapy Worklist Order      chlorhexidine 4% Liquid 1 Application(s) Topical <User Schedule>  folic acid 1 milliGRAM(s) Oral daily  Infuse HPC Product      multivitamin 1 Tablet(s) Oral daily  ondansetron Injectable (Chemo) 8 milliGRAM(s) IV Push every 8 hours  sodium chloride 0.9%. 1000 milliLiter(s) IV Continuous <Continuous>  sodium chloride 0.9%. (Chemo) 1000 milliLiter(s) IV Continuous <Continuous>      PRN:   acetaminophen     Tablet .. 650 milliGRAM(s) Oral every 6 hours PRN  LORazepam   Injectable 1 milliGRAM(s) IV Push every 6 hours PRN  prochlorperazine   Injectable 10 milliGRAM(s) IntraMuscular every 6 hours PRN  senna 1 Tablet(s) Oral at bedtime PRN  sodium chloride 0.9% lock flush 10 milliLiter(s) IV Push every 1 hour PRN      A/P:  37 year old male with severe aplastic anemia with multiple lines of treatment admitted for a haplo-identical bone marrow transplant with  Flu / Cy / TBI preparative regimen   Day -  1  start CTX hydration - 1/2NS @ 200ml /hr    Strict I&O, daily weights, prn diuresis   Day -6 - day -2 - Fludarabine 30mg/m2 IV  Day -6 - day - 5 - CTX 14.5mg/kg/day IV. CTX to start after urine SG < 1.010. Mesna  12mg / kg - hemorrhagic cystitis ppx   Day -2 - IVIG 0.4 g/kg (ideal body weight) every 3 weeks. Premedication with Tylenol and benadryl   Day -1 -  cGy x 1 dose   Day - 1 - at 2200 begin transplant hydration : 0.45Ns + 1 amp (50mEq) sodium bicarbonate at 150ml /hr; continue transplant hydration for 24 hours post infusion    Day + 2 at 2200 - begin CTX hydration (0.45NS + 10 mEq KCl/ @150ml /m2  continue 24 hours post last dose of CTX   Day + 3 - + 4 - CTX 50mg/kg/day IV. Begin CTX when urine SG < 1.010. EKG daily. Mesna for hemorraghic cystitis ppx.   Day + 5 - start Zarxio,  MMF and Tacrolimus. Check Tacrolimus levels on Monday and Thursday.   when ANC < 500, start Cipro 500mg po BID. If becomes febrile, pan cx, CXR and change Cipro to Cefepime 2g IV q 8 hours. Continue until count recovery  6/- Fludarabine 4/5, intermittent nausea, continue antiemetics   /6- Fludarabine 5/5, IVIG today     1. Infectious Disease:   acyclovir   Oral Tab/Cap 400 milliGRAM(s) Oral every 8 hours  clotrimazole Lozenge 1 Lozenge Oral five times a day  fluconAZOLE   Tablet 400 milliGRAM(s) Oral daily  trimethoprim  160 mG/sulfamethoxazole 800 mG 1 Tablet(s) Oral every 12 hours    2. VOD Prophylaxis: Actigall, Glutamine, Heparin (dosed at 100 units / kg / day)     3. GI Prophylaxis:    famotidine    Tablet 20 milliGRAM(s) Oral two times a day    4. Mouthcare - NS / NaHCO3 rinses, Mycelex, Biotene; Skin care     5. GVHD prophylaxis   TBI day -1   CTX days +3, +4  MMF, tacrolimus to start day + 5     6. Transfuse & replete electrolytes prn   1 unit PRBC     7. IV hydration, daily weights, strict I&O, prn diuresis     8. PO intake as tolerated, nutrition follow up as needed, MVI, folic acid     9. Antiemetics, anti-diarrhea medications:   ondansetron Injectable (Chemo) 8 milliGRAM(s) IV Push every 8 hours  LORazepam   Injectable 1 milliGRAM(s) IV Push every 6 hours PRN  metoclopramide Injectable 10 milliGRAM(s) IV Push every 6 hours PRN    10. OOB as tolerated, physical therapy consult if needed     11. Monitor coags / fibrinogen 2x week, vitamin K as needed     12. Monitor closely for clinical changes, monitor for fevers     13. Emotional support provided, plan of care discussed and questions addressed     14. Patient education done regarding chemotherapy prep, plan of care, restrictions and discharge planning     15. Continue regular social work input     I have written the above note for Dr. Wilder performed service with me in the room.   Axel Goncalves PA-C ([phone])    I have seen and examined patient with NP, I agree with above note as scribed.            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) with Flu / Cy / TBI prep regimen for treatment of severe aplastic anemia    S: Patient seen and examined with [facility]PC Transplant Team:   +Nausea  +Fatigue    O: Rest of ROS negative    Vital Signs Last 24 Hrs  T(C): 37.2 (07 Jun 2023 05:30), Max: 37.4 (06 Jun 2023 21:24)  T(F): 99 (07 Jun 2023 05:30), Max: 99.3 (06 Jun 2023 21:24)  HR: 90 (07 Jun 2023 05:30) (83 - 101)  BP: 124/69 (07 Jun 2023 05:30) (101/60 - 130/71)  BP(mean): --  RR: 18 (07 Jun 2023 05:30) (17 - 18)  SpO2: 98% (07 Jun 2023 05:30) (97% - 100%)    Parameters below as of 07 Jun 2023 05:30  Patient On (Oxygen Delivery Method): room air      Admit weight: 102.8kg  Daily Weight in kG: pend    Intake / Output:   06-06 @ 07:01  -  06-07 @ 07:00  --------------------------------------------------------  IN: 2886.7 mL / OUT: 2700 mL / NET: 186.7 mL    PE:   Oropharynx: Clear  Oral Mucositis: (-)                                                        Grade:   CVS: RRR, +S1,S2  Lungs: CTA B/L  Abdomen: Soft, NT, ND, +BS  Extremities: No edema in BLE  Gastric Mucositis: (-)                                                 Grade:   Intestinal Mucositis:   (-)                                           Grade:   Skin: Intact  TLC: CDI  Neuro: Alert, Ox3  Pain: Denies        Labs:                         6.9    0.60  )-----------( 148      ( 07 Jun 2023 06:52 )             20.3     06-07    135  |  104  |  13  ----------------------------<  93  3.9   |  22  |  0.90    Ca    8.9      07 Jun 2023 06:52  Phos  2.8     06-07  Mg     2.3     06-07    TPro  7.4  /  Alb  4.5  /  TBili  1.2  /  DBili  0.2  /  AST  39  /  ALT  63<H>  /  AlkPhos  91  06-07      Meds:   Antimicrobials:   acyclovir   Oral Tab/Cap 400 milliGRAM(s) Oral every 8 hours  ciprofloxacin     Tablet 500 milliGRAM(s) Oral every 12 hours  clotrimazole Lozenge 1 Lozenge Oral five times a day  fluconAZOLE   Tablet 400 milliGRAM(s) Oral daily      Heme / Onc:   heparin  Infusion 424 Unit(s)/Hr IV Continuous <Continuous>      GI:  famotidine    Tablet 20 milliGRAM(s) Oral two times a day  senna 1 Tablet(s) Oral at bedtime PRN  sodium bicarbonate Mouth Rinse 10 milliLiter(s) Swish and Spit five times a day  ursodiol Capsule 300 milliGRAM(s) Oral two times a day      Cardiovascular:       Immunologic:       Other medications:   acetaminophen     Tablet .. 650 milliGRAM(s) Oral every 6 hours  aprepitant (Chemo) 80 milliGRAM(s) Oral every 24 hours  Biotene Dry Mouth Oral Rinse 5 milliLiter(s) Swish and Spit five times a day  Chemotherapy Worklist Order      chlorhexidine 4% Liquid 1 Application(s) Topical <User Schedule>  folic acid 1 milliGRAM(s) Oral daily  Infuse HPC Product      multivitamin 1 Tablet(s) Oral daily  ondansetron Injectable (Chemo) 8 milliGRAM(s) IV Push every 8 hours  sodium chloride 0.9%. 1000 milliLiter(s) IV Continuous <Continuous>  sodium chloride 0.9%. (Chemo) 1000 milliLiter(s) IV Continuous <Continuous>      PRN:   acetaminophen     Tablet .. 650 milliGRAM(s) Oral every 6 hours PRN  LORazepam   Injectable 1 milliGRAM(s) IV Push every 6 hours PRN  prochlorperazine   Injectable 10 milliGRAM(s) IntraMuscular every 6 hours PRN  senna 1 Tablet(s) Oral at bedtime PRN  sodium chloride 0.9% lock flush 10 milliLiter(s) IV Push every 1 hour PRN      A/P:  37 year old male with severe aplastic anemia with multiple lines of treatment admitted for a haplo-identical bone marrow transplant with  Flu / Cy / TBI preparative regimen   Day -  1  start CTX hydration - 1/2NS @ 200ml /hr    Strict I&O, daily weights, prn diuresis   Day -6 - day -2 - Fludarabine 30mg/m2 IV  Day -6 - day - 5 - CTX 14.5mg/kg/day IV. CTX to start after urine SG < 1.010. Mesna  12mg / kg - hemorrhagic cystitis ppx   Day -2 - IVIG 0.4 g/kg (ideal body weight) every 3 weeks. Premedication with Tylenol and benadryl   Day -1 -  cGy x 1 dose   Day - 1 - at 2200 begin transplant hydration : 0.45Ns + 1 amp (50mEq) sodium bicarbonate at 150ml /hr; continue transplant hydration for 24 hours post infusion    Day + 2 at 2200 - begin CTX hydration (0.45NS + 10 mEq KCl/ @150ml /m2  continue 24 hours post last dose of CTX   Day + 3 - + 4 - CTX 50mg/kg/day IV. Begin CTX when urine SG < 1.010. EKG daily. Mesna for hemorraghic cystitis ppx.   Day + 5 - start Zarxio,  MMF and Tacrolimus. Check Tacrolimus levels on Monday and Thursday.   when ANC < 500, start Cipro 500mg po BID. If becomes febrile, pan cx, CXR and change Cipro to Cefepime 2g IV q 8 hours. Continue until count recovery  6/5- Fludarabine 4/5, intermittent nausea, continue antiemetics   6/6- Fludarabine 5/5, IVIG today   6/7 TBI today    1. Infectious Disease:   acyclovir   Oral Tab/Cap 400 milliGRAM(s) Oral every 8 hours  ciprofloxacin     Tablet 500 milliGRAM(s) Oral every 12 hours  clotrimazole Lozenge 1 Lozenge Oral five times a day  fluconAZOLE   Tablet 400 milliGRAM(s) Oral daily    2. VOD Prophylaxis: Actigall, Glutamine, Heparin (dosed at 100 units / kg / day)     3. GI Prophylaxis:    famotidine    Tablet 20 milliGRAM(s) Oral two times a day    4. Mouthcare - NS / NaHCO3 rinses, Mycelex, Biotene; Skin care     5. GVHD prophylaxis   TBI day -1   CTX days +3, +4  MMF, tacrolimus to start day + 5     6. Transfuse & replete electrolytes prn   1 unit PRBC     7. IV hydration, daily weights, strict I&O, prn diuresis     8. PO intake as tolerated, nutrition follow up as needed, MVI, folic acid     9. Antiemetics, anti-diarrhea medications:   ondansetron Injectable (Chemo) 8 milliGRAM(s) IV Push every 8 hours  LORazepam   Injectable 1 milliGRAM(s) IV Push every 6 hours PRN  metoclopramide Injectable 10 milliGRAM(s) IV Push every 6 hours PRN    10. OOB as tolerated, physical therapy consult if needed     11. Monitor coags / fibrinogen 2x week, vitamin K as needed     12. Monitor closely for clinical changes, monitor for fevers     13. Emotional support provided, plan of care discussed and questions addressed     14. Patient education done regarding chemotherapy prep, plan of care, restrictions and discharge planning     15. Continue regular social work input     I have written the above note for Dr. Wilder performed service with me in the room.   Axel Goncalves PA-C ([phone])    I have seen and examined patient with NP, I agree with above note as scribed.

## 2023-06-07 NOTE — PROGRESS NOTE ADULT - CRITICAL CARE ATTENDING COMMENT
1. Admit to BMTU Today is day - 2.  2. TLC placement in IR   3. Day -6 - day + 5 - antiemetics   4. Day - 6 start CTX hydration - 1/2NS @ 200ml /hr    5. Strict I&O, daily weights, prn diuresis   6. Day -6 - day -2 - Fludarabine 30mg/m2 IV  7. Day -6 - day - 5 - CTX 14.5mg/kg/day IV. CTX to start after urine SG < 1.010. Mesna  12mg / kg - hemorrhagic cystitis ppx   8. Day -2 - IVIG 0.4 g/kg (ideal body weight) every 3 weeks. Premedication with Tylenol and benadryl  9. Day -1 -  cGy x 1 dose   10. Day - 1 - at 2200 begin transplant hydration : 0.45Ns + 1 amp (50mEq) sodium bicarbonate at 150ml /hr; continue transplant hydration for 24 hours post infusion   11. Day 0 – [facility]PC transplant   12. Day + 2 at 2200 - begin CTX hydration (0.45NS + 10 mEq KCl/ @150ml /m2  continue 24 hours post last dose of CTX   13. Day + 3 - + 4 - CTX 50mg/kg/day IV. Begin CTX when urine SG < 1.010. EKG daily. Mesna for hemorraghic cystitis ppx.   14. Day + 5 - start Zarxio,  MMF and Tacrolimus. Check Tacrolimus levels on Monday and Thursday.   15. Anxiolytics, antinausea, antidiarrhea medications as needed   16. Lasix PRN while being aggressively hydrated to avoid VOD   17. Nutritional support, pain management  Need for prophylactic measures:  1. VOD prophylaxis - low dose heparin gtt (dosed at 100 units / kg / day), glutamine supplementation, Actigall BID   2. PCP prophylaxis - Bactrim DS through day -2    3. Antiviral prophylaxis - Continue Acyclovir   4. Antifungal prophylaxis- Diflucan 400 mg po daily.  5.. GI prophylaxis - Protonix po QD   6. Antibacterial prophylaxis - when ANC < 500, start Cipro 500mg po BID. If becomes febrile, pan cx, CXR and change Cipro to Cefepime 2g IV q 8 hours. Continue until count recovery  7. Aggressive mouth care and skin care as per protocol  8. GVHD prophylaxis- high dose CTX; MMF and Tacrolimus.    Patient with hemoglobin drop of ~2  on 6/4, likely due to chemotherapy however will repeat CBC tonight to establish stability, likely will require transfusion.  6/5 describes nausea 1. Admit to BMTU Today is day - 1.  2. TLC placement in IR   3. Day -6 - day + 5 - antiemetics   4. Day - 6 start CTX hydration - 1/2NS @ 200ml /hr    5. Strict I&O, daily weights, prn diuresis   6. Day -6 - day -2 - Fludarabine 30mg/m2 IV  7. Day -6 - day - 5 - CTX 14.5mg/kg/day IV. CTX to start after urine SG < 1.010. Mesna  12mg / kg - hemorrhagic cystitis ppx   8. Day -2 - IVIG 0.4 g/kg (ideal body weight) every 3 weeks. Premedication with Tylenol and benadryl  9. Day -1 -  cGy x 1 dose   10. Day - 1 - at 2200 begin transplant hydration : 0.45Ns + 1 amp (50mEq) sodium bicarbonate at 150ml /hr; continue transplant hydration for 24 hours post infusion   11. Day 0 – [facility] transplant   12. Day + 2 at 2200 - begin CTX hydration (0.45NS + 10 mEq KCl/ @150ml /m2  continue 24 hours post last dose of CTX   13. Day + 3 - + 4 - CTX 50mg/kg/day IV. Begin CTX when urine SG < 1.010. EKG daily. Mesna for hemorraghic cystitis ppx.   14. Day + 5 - start Zarxio,  MMF and Tacrolimus. Check Tacrolimus levels on Monday and Thursday.   15. Anxiolytics, antinausea, antidiarrhea medications as needed   16. Lasix PRN while being aggressively hydrated to avoid VOD   17. Nutritional support, pain management  Need for prophylactic measures:  1. VOD prophylaxis - low dose heparin gtt (dosed at 100 units / kg / day), glutamine supplementation, Actigall BID   2. PCP prophylaxis - Bactrim DS through day -2    3. Antiviral prophylaxis - Continue Acyclovir   4. Antifungal prophylaxis- Diflucan 400 mg po daily.  5.. GI prophylaxis - Protonix po QD   6. Antibacterial prophylaxis - when ANC < 500, start Cipro 500mg po BID. If becomes febrile, pan cx, CXR and change Cipro to Cefepime 2g IV q 8 hours. Continue until count recovery  7. Aggressive mouth care and skin care as per protocol  8. GVHD prophylaxis- high dose CTX; MMF and Tacrolimus.    Patient with hemoglobin drop of ~2  on 6/4, likely due to chemotherapy however will repeat CBC tonight to establish stability, likely will require transfusion.  6/5 describes nausea  6/7 sister collected w/o complication

## 2023-06-08 LAB
ALBUMIN SERPL ELPH-MCNC: 3.9 G/DL — SIGNIFICANT CHANGE UP (ref 3.3–5)
ALP SERPL-CCNC: 86 U/L — SIGNIFICANT CHANGE UP (ref 40–120)
ALT FLD-CCNC: 62 U/L — HIGH (ref 10–45)
ANION GAP SERPL CALC-SCNC: 12 MMOL/L — SIGNIFICANT CHANGE UP (ref 5–17)
APTT BLD: 25.7 SEC — LOW (ref 27.5–35.5)
AST SERPL-CCNC: 39 U/L — SIGNIFICANT CHANGE UP (ref 10–40)
BASOPHILS # BLD AUTO: 0 K/UL — SIGNIFICANT CHANGE UP (ref 0–0.2)
BASOPHILS NFR BLD AUTO: 0 % — SIGNIFICANT CHANGE UP (ref 0–2)
BILIRUB SERPL-MCNC: 1.1 MG/DL — SIGNIFICANT CHANGE UP (ref 0.2–1.2)
BUN SERPL-MCNC: 13 MG/DL — SIGNIFICANT CHANGE UP (ref 7–23)
CALCIUM SERPL-MCNC: 8.8 MG/DL — SIGNIFICANT CHANGE UP (ref 8.4–10.5)
CHLORIDE SERPL-SCNC: 103 MMOL/L — SIGNIFICANT CHANGE UP (ref 96–108)
CO2 SERPL-SCNC: 22 MMOL/L — SIGNIFICANT CHANGE UP (ref 22–31)
CREAT SERPL-MCNC: 0.82 MG/DL — SIGNIFICANT CHANGE UP (ref 0.5–1.3)
EGFR: 117 ML/MIN/1.73M2 — SIGNIFICANT CHANGE UP
EOSINOPHIL # BLD AUTO: 0.02 K/UL — SIGNIFICANT CHANGE UP (ref 0–0.5)
EOSINOPHIL NFR BLD AUTO: 4.6 % — SIGNIFICANT CHANGE UP (ref 0–6)
FIBRINOGEN PPP-MCNC: 370 MG/DL — SIGNIFICANT CHANGE UP (ref 200–445)
GLUCOSE SERPL-MCNC: 89 MG/DL — SIGNIFICANT CHANGE UP (ref 70–99)
HCT VFR BLD CALC: 20.2 % — CRITICAL LOW (ref 39–50)
HGB BLD-MCNC: 7.1 G/DL — LOW (ref 13–17)
INR BLD: 1.14 RATIO — SIGNIFICANT CHANGE UP (ref 0.88–1.16)
LDH SERPL L TO P-CCNC: 283 U/L — HIGH (ref 50–242)
LYMPHOCYTES # BLD AUTO: 0.02 K/UL — LOW (ref 1–3.3)
LYMPHOCYTES # BLD AUTO: 3.1 % — LOW (ref 13–44)
MAGNESIUM SERPL-MCNC: 2.2 MG/DL — SIGNIFICANT CHANGE UP (ref 1.6–2.6)
MANUAL SMEAR VERIFICATION: SIGNIFICANT CHANGE UP
MCHC RBC-ENTMCNC: 33.3 PG — SIGNIFICANT CHANGE UP (ref 27–34)
MCHC RBC-ENTMCNC: 35.1 GM/DL — SIGNIFICANT CHANGE UP (ref 32–36)
MCV RBC AUTO: 94.8 FL — SIGNIFICANT CHANGE UP (ref 80–100)
MONOCYTES # BLD AUTO: 0.05 K/UL — SIGNIFICANT CHANGE UP (ref 0–0.9)
MONOCYTES NFR BLD AUTO: 9.2 % — SIGNIFICANT CHANGE UP (ref 2–14)
NEUTROPHILS # BLD AUTO: 0.42 K/UL — LOW (ref 1.8–7.4)
NEUTROPHILS NFR BLD AUTO: 78.5 % — HIGH (ref 43–77)
NEUTS BAND # BLD: 4.6 % — SIGNIFICANT CHANGE UP (ref 0–8)
PHOSPHATE SERPL-MCNC: 3.5 MG/DL — SIGNIFICANT CHANGE UP (ref 2.5–4.5)
PLAT MORPH BLD: NORMAL — SIGNIFICANT CHANGE UP
PLATELET # BLD AUTO: 126 K/UL — LOW (ref 150–400)
POTASSIUM SERPL-MCNC: 3.8 MMOL/L — SIGNIFICANT CHANGE UP (ref 3.5–5.3)
POTASSIUM SERPL-SCNC: 3.8 MMOL/L — SIGNIFICANT CHANGE UP (ref 3.5–5.3)
PROT SERPL-MCNC: 6.8 G/DL — SIGNIFICANT CHANGE UP (ref 6–8.3)
PROTHROM AB SERPL-ACNC: 13.1 SEC — SIGNIFICANT CHANGE UP (ref 10.5–13.4)
RBC # BLD: 2.13 M/UL — LOW (ref 4.2–5.8)
RBC # FLD: 17.7 % — HIGH (ref 10.3–14.5)
RBC BLD AUTO: SIGNIFICANT CHANGE UP
SODIUM SERPL-SCNC: 137 MMOL/L — SIGNIFICANT CHANGE UP (ref 135–145)
TACROLIMUS SERPL-MCNC: <0.8 NG/ML — SIGNIFICANT CHANGE UP
WBC # BLD: 0.51 K/UL — CRITICAL LOW (ref 3.8–10.5)
WBC # FLD AUTO: 0.51 K/UL — CRITICAL LOW (ref 3.8–10.5)

## 2023-06-08 PROCEDURE — 99291 CRITICAL CARE FIRST HOUR: CPT

## 2023-06-08 PROCEDURE — 38240 TRANSPLT ALLO HCT/DONOR: CPT

## 2023-06-08 RX ORDER — POTASSIUM CHLORIDE 20 MEQ
20 PACKET (EA) ORAL
Refills: 0 | Status: COMPLETED | OUTPATIENT
Start: 2023-06-08 | End: 2023-06-08

## 2023-06-08 RX ORDER — HYDROCORTISONE 20 MG
100 TABLET ORAL ONCE
Refills: 0 | Status: COMPLETED | OUTPATIENT
Start: 2023-06-08 | End: 2023-06-08

## 2023-06-08 RX ORDER — DIPHENHYDRAMINE HCL 50 MG
25 CAPSULE ORAL ONCE
Refills: 0 | Status: COMPLETED | OUTPATIENT
Start: 2023-06-08 | End: 2023-06-08

## 2023-06-08 RX ORDER — FUROSEMIDE 40 MG
40 TABLET ORAL ONCE
Refills: 0 | Status: COMPLETED | OUTPATIENT
Start: 2023-06-08 | End: 2023-06-08

## 2023-06-08 RX ORDER — PROCHLORPERAZINE MALEATE 5 MG
10 TABLET ORAL EVERY 6 HOURS
Refills: 0 | Status: DISCONTINUED | OUTPATIENT
Start: 2023-06-08 | End: 2023-06-30

## 2023-06-08 RX ADMIN — Medication 5 MILLILITER(S): at 08:55

## 2023-06-08 RX ADMIN — Medication 1 TABLET(S): at 13:46

## 2023-06-08 RX ADMIN — Medication 400 MILLIGRAM(S): at 06:06

## 2023-06-08 RX ADMIN — FAMOTIDINE 20 MILLIGRAM(S): 10 INJECTION INTRAVENOUS at 06:06

## 2023-06-08 RX ADMIN — URSODIOL 300 MILLIGRAM(S): 250 TABLET, FILM COATED ORAL at 06:06

## 2023-06-08 RX ADMIN — Medication 1 LOZENGE: at 08:55

## 2023-06-08 RX ADMIN — Medication 10 MILLILITER(S): at 20:35

## 2023-06-08 RX ADMIN — Medication 100 MILLIGRAM(S): at 14:36

## 2023-06-08 RX ADMIN — URSODIOL 300 MILLIGRAM(S): 250 TABLET, FILM COATED ORAL at 18:35

## 2023-06-08 RX ADMIN — Medication 5 MILLILITER(S): at 13:47

## 2023-06-08 RX ADMIN — Medication 1 LOZENGE: at 00:34

## 2023-06-08 RX ADMIN — FLUCONAZOLE 400 MILLIGRAM(S): 150 TABLET ORAL at 13:46

## 2023-06-08 RX ADMIN — Medication 500 MILLIGRAM(S): at 18:36

## 2023-06-08 RX ADMIN — APREPITANT 80 MILLIGRAM(S): 80 CAPSULE ORAL at 13:46

## 2023-06-08 RX ADMIN — Medication 1 LOZENGE: at 17:06

## 2023-06-08 RX ADMIN — Medication 1 LOZENGE: at 13:47

## 2023-06-08 RX ADMIN — Medication 50 MILLIEQUIVALENT(S): at 08:56

## 2023-06-08 RX ADMIN — ONDANSETRON 8 MILLIGRAM(S): 8 TABLET, FILM COATED ORAL at 21:52

## 2023-06-08 RX ADMIN — ONDANSETRON 8 MILLIGRAM(S): 8 TABLET, FILM COATED ORAL at 13:46

## 2023-06-08 RX ADMIN — Medication 50 MILLIEQUIVALENT(S): at 12:38

## 2023-06-08 RX ADMIN — Medication 10 MILLILITER(S): at 13:47

## 2023-06-08 RX ADMIN — Medication 25 MILLIGRAM(S): at 14:40

## 2023-06-08 RX ADMIN — Medication 400 MILLIGRAM(S): at 21:52

## 2023-06-08 RX ADMIN — Medication 650 MILLIGRAM(S): at 12:04

## 2023-06-08 RX ADMIN — Medication 10 MILLILITER(S): at 17:06

## 2023-06-08 RX ADMIN — Medication 5 MILLILITER(S): at 00:33

## 2023-06-08 RX ADMIN — Medication 40 MILLIGRAM(S): at 08:45

## 2023-06-08 RX ADMIN — Medication 10 MILLILITER(S): at 08:55

## 2023-06-08 RX ADMIN — CHLORHEXIDINE GLUCONATE 1 APPLICATION(S): 213 SOLUTION TOPICAL at 08:57

## 2023-06-08 RX ADMIN — HEPARIN SODIUM 4.24 UNIT(S)/HR: 5000 INJECTION INTRAVENOUS; SUBCUTANEOUS at 00:34

## 2023-06-08 RX ADMIN — Medication 40 MILLIGRAM(S): at 17:07

## 2023-06-08 RX ADMIN — Medication 400 MILLIGRAM(S): at 13:47

## 2023-06-08 RX ADMIN — Medication 50 MILLIEQUIVALENT(S): at 13:48

## 2023-06-08 RX ADMIN — FAMOTIDINE 20 MILLIGRAM(S): 10 INJECTION INTRAVENOUS at 18:35

## 2023-06-08 RX ADMIN — Medication 5 MILLILITER(S): at 17:06

## 2023-06-08 RX ADMIN — Medication 5 MILLILITER(S): at 20:35

## 2023-06-08 RX ADMIN — Medication 50 MILLIGRAM(S): at 12:05

## 2023-06-08 RX ADMIN — Medication 500 MILLIGRAM(S): at 06:06

## 2023-06-08 RX ADMIN — Medication 10 MILLILITER(S): at 00:33

## 2023-06-08 RX ADMIN — Medication 1 MILLIGRAM(S): at 13:49

## 2023-06-08 RX ADMIN — ONDANSETRON 8 MILLIGRAM(S): 8 TABLET, FILM COATED ORAL at 06:06

## 2023-06-08 RX ADMIN — Medication 1 LOZENGE: at 20:35

## 2023-06-08 RX ADMIN — Medication 25 MILLIGRAM(S): at 12:04

## 2023-06-08 NOTE — PROGRESS NOTE ADULT - CRITICAL CARE ATTENDING COMMENT
1. Admit to BMTU Today is day 0  2. TLC placement in IR   3. Day -6 - day + 5 - antiemetics   4. Day - 6 start CTX hydration - 1/2NS @ 200ml /hr    5. Strict I&O, daily weights, prn diuresis   6. Day -6 - day -2 - Fludarabine 30mg/m2 IV  7. Day -6 - day - 5 - CTX 14.5mg/kg/day IV. CTX to start after urine SG < 1.010. [place]  12mg / kg - hemorrhagic cystitis ppx   8. Day -2 - IVIG 0.4 g/kg (ideal body weight) every 3 weeks. Premedication with Tylenol and benadryl  9. Day -1 -  cGy x 1 dose   10. Day - 1 - at 2200 begin transplant hydration : 0.45Ns + 1 amp (50mEq) sodium bicarbonate at 150ml /hr; continue transplant hydration for 24 hours post infusion   11. Day 0 – [facility] transplant   12. Day + 2 at 2200 - begin CTX hydration (0.45NS + 10 mEq KCl/ @150ml /m2  continue 24 hours post last dose of CTX   13. Day + 3 - + 4 - CTX 50mg/kg/day IV. Begin CTX when urine SG < 1.010. EKG daily. [place] for hemorraghic cystitis ppx.   14. Day + 5 - start Zarxio,  MMF and Tacrolimus. Check Tacrolimus levels on Monday and Thursday.   15. Anxiolytics, antinausea, antidiarrhea medications as needed   16. Lasix PRN while being aggressively hydrated to avoid VOD   17. Nutritional support, pain management  Need for prophylactic measures:  1. VOD prophylaxis - low dose heparin gtt (dosed at 100 units / kg / day), glutamine supplementation, Actigall BID   2. PCP prophylaxis - Bactrim DS through day -2    3. Antiviral prophylaxis - Continue Acyclovir   4. Antifungal prophylaxis- Diflucan 400 mg po daily.  5.. GI prophylaxis - Protonix po QD   6. Antibacterial prophylaxis - when ANC < 500, start Cipro 500mg po BID. If becomes febrile, pan cx, CXR and change Cipro to Cefepime 2g IV q 8 hours. Continue until count recovery  7. Aggressive mouth care and skin care as per protocol  8. GVHD prophylaxis- high dose CTX; MMF and Tacrolimus.    Patient with hemoglobin drop of ~2  on 6/4, likely due to chemotherapy however will repeat CBC tonight to establish stability, likely will require transfusion.  6/5 describes nausea  6/7 sister collected w/o complication 1. Admit to BMTU Today is day 0  2. TLC placement in IR   3. Day -6 - day + 5 - antiemetics   4. Day - 6 start CTX hydration - 1/2NS @ 200ml /hr    5. Strict I&O, daily weights, prn diuresis   6. Day -6 - day -2 - Fludarabine 30mg/m2 IV  7. Day -6 - day - 5 - CTX 14.5mg/kg/day IV. CTX to start after urine SG < 1.010. [place]  12mg / kg - hemorrhagic cystitis ppx   8. Day -2 - IVIG 0.4 g/kg (ideal body weight) every 3 weeks. Premedication with Tylenol and benadryl  9. Day -1 -  cGy x 1 dose   10. Day - 1 - at 2200 begin transplant hydration : 0.45Ns + 1 amp (50mEq) sodium bicarbonate at 150ml /hr; continue transplant hydration for 24 hours post infusion   11. Day 0 – HPC transplant   12. Day + 2 at 2200 - begin CTX hydration (0.45NS + 10 mEq KCl/ @150ml /m2  continue 24 hours post last dose of CTX   13. Day + 3 - + 4 - CTX 50mg/kg/day IV. Begin CTX when urine SG < 1.010. EKG daily. [place] for hemorraghic cystitis ppx.   14. Day + 5 - start Zarxio,  MMF and Tacrolimus. Check Tacrolimus levels on Monday and Thursday.   15. Anxiolytics, antinausea, antidiarrhea medications as needed   16. Lasix PRN while being aggressively hydrated to avoid VOD   17. Nutritional support, pain management  Need for prophylactic measures:  1. VOD prophylaxis - low dose heparin gtt (dosed at 100 units / kg / day), glutamine supplementation, Actigall BID   2. PCP prophylaxis - Bactrim DS through day -2    3. Antiviral prophylaxis - Continue Acyclovir   4. Antifungal prophylaxis- Diflucan 400 mg po daily.  5.. GI prophylaxis - Protonix po QD   6. Antibacterial prophylaxis - when ANC < 500, start Cipro 500mg po BID. If becomes febrile, pan cx, CXR and change Cipro to Cefepime 2g IV q 8 hours. Continue until count recovery  7. Aggressive mouth care and skin care as per protocol  8. GVHD prophylaxis- high dose CTX; MMF and Tacrolimus.  9. transfusion and electrolyte support    Patient with hemoglobin drop of ~2  on 6/4, likely due to chemotherapy however will repeat CBC tonight to establish stability, likely will require transfusion.  6/5 describes nausea  6/7 sister collected w/o complication

## 2023-06-08 NOTE — PROGRESS NOTE ADULT - SUBJECTIVE AND OBJECTIVE BOX
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) with Flu / Cy / TBI prep regimen for treatment of severe aplastic anemia    S: Patient seen and examined with HPC Transplant Team:       O: Vitals:   Vital Signs Last 24 Hrs  T(C): 36.7 (08 Jun 2023 05:25), Max: 37.4 (07 Jun 2023 17:17)  T(F): 98.1 (08 Jun 2023 05:25), Max: 99.3 (07 Jun 2023 17:17)  HR: 91 (08 Jun 2023 05:25) (91 - 105)  BP: 114/65 (08 Jun 2023 05:25) (111/57 - 132/66)  BP(mean): --  RR: 18 (08 Jun 2023 05:25) (18 - 19)  SpO2: 98% (08 Jun 2023 05:25) (98% - 100%)    Parameters below as of 08 Jun 2023 05:25  Patient On (Oxygen Delivery Method): room air      Admit weight: 102.8kg   Daily     Daily     Intake / Output:   06-07 @ 07:01  -  06-08 @ 07:00  --------------------------------------------------------  IN: 3248 mL / OUT: 2610 mL / NET: 638 mL      PE:   Oropharynx: Clear  Oral Mucositis: (-)                                                        Grade:   CVS: RRR, +S1,S2  Lungs: CTA B/L  Abdomen: Soft, NT, ND, +BS  Extremities: No edema in BLE  Gastric Mucositis: (-)                                                 Grade:   Intestinal Mucositis:   (-)                                           Grade:   Skin: Intact  TLC: CDI  Neuro: Alert, Ox3  Pain: Denies      Labs:       Meds:   Antimicrobials:   acyclovir   Oral Tab/Cap 400 milliGRAM(s) Oral every 8 hours  ciprofloxacin     Tablet 500 milliGRAM(s) Oral every 12 hours  clotrimazole Lozenge 1 Lozenge Oral five times a day  fluconAZOLE   Tablet 400 milliGRAM(s) Oral daily      Heme / Onc:   heparin  Infusion 424 Unit(s)/Hr IV Continuous <Continuous>      GI:  famotidine    Tablet 20 milliGRAM(s) Oral two times a day  senna 1 Tablet(s) Oral at bedtime PRN  sodium bicarbonate Mouth Rinse 10 milliLiter(s) Swish and Spit five times a day  ursodiol Capsule 300 milliGRAM(s) Oral two times a day      Cardiovascular:       Immunologic:       Other medications:   acetaminophen     Tablet .. 650 milliGRAM(s) Oral every 6 hours  acetaminophen   Tablet (Chemo) 650 milliGRAM(s) Oral once  aprepitant (Chemo) 80 milliGRAM(s) Oral every 24 hours  Biotene Dry Mouth Oral Rinse 5 milliLiter(s) Swish and Spit five times a day  Chemotherapy Worklist Order      chlorhexidine 4% Liquid 1 Application(s) Topical <User Schedule>  diphenhydrAMINE  Injectable (Chemo) 25 milliGRAM(s) IV Push once  folic acid 1 milliGRAM(s) Oral daily  hydrocortisone  Injectable (Chemo) 50 milliGRAM(s) IV Push once  Infuse HPC Product      multivitamin 1 Tablet(s) Oral daily  ondansetron Injectable (Chemo) 8 milliGRAM(s) IV Push every 8 hours  sodium bicarbonate  Infusion (Chemo) 0.074 mEq/kG/Hr IV Continuous <Continuous>  sodium chloride 0.9%. 1000 milliLiter(s) IV Continuous <Continuous>  sodium chloride 0.9%. (Chemo) 1000 milliLiter(s) IV Continuous <Continuous>      PRN:   acetaminophen     Tablet .. 650 milliGRAM(s) Oral every 6 hours PRN  diphenhydrAMINE Injectable 25 milliGRAM(s) IV Push every 12 hours PRN  LORazepam   Injectable 1 milliGRAM(s) IV Push every 6 hours PRN  prochlorperazine   Injectable 10 milliGRAM(s) IntraMuscular every 6 hours PRN  senna 1 Tablet(s) Oral at bedtime PRN  sodium chloride 0.9% lock flush 10 milliLiter(s) IV Push every 1 hour PRN      A/P:  37 year old male with severe aplastic anemia with multiple lines of treatment admitted for a haplo-identical bone marrow transplant with  Flu / Cy / TBI preparative regimen   Day 0  start CTX hydration - 1/2NS @ 200ml /hr    Strict I&O, daily weights, prn diuresis   Day -6 - day -2 - Fludarabine 30mg/m2 IV  Day -6 - day - 5 - CTX 14.5mg/kg/day IV. CTX to start after urine SG < 1.010. Mesna  12mg / kg - hemorrhagic cystitis ppx   Day -2 - IVIG 0.4 g/kg (ideal body weight) every 3 weeks. Premedication with Tylenol and benadryl   Day -1 -  cGy x 1 dose   Day - 1 - at 2200 begin transplant hydration : 0.45Ns + 1 amp (50mEq) sodium bicarbonate at 150ml /hr; continue transplant hydration for 24 hours post infusion    Day + 2 at 2200 - begin CTX hydration (0.45NS + 10 mEq KCl/ @150ml /m2  continue 24 hours post last dose of CTX   Day + 3 - + 4 - CTX 50mg/kg/day IV. Begin CTX when urine SG < 1.010. EKG daily. Mesna for hemorraghic cystitis ppx.   Day + 5 - start Zarxio,  MMF and Tacrolimus. Check Tacrolimus levels on Monday and Thursday.   when ANC < 500, start Cipro 500mg po BID. If becomes febrile, pan cx, CXR and change Cipro to Cefepime 2g IV q 8 hours. Continue until count recovery  6/5- Fludarabine 4/5, intermittent nausea, continue antiemetics   6/6- Fludarabine 5/5, IVIG today   6/7 TBI today  6/8- HPC transplant today. Continue transplant hydration for 24 hours post infusion of bone marrow     1. Infectious Disease:   acyclovir   Oral Tab/Cap 400 milliGRAM(s) Oral every 8 hours  ciprofloxacin     Tablet 500 milliGRAM(s) Oral every 12 hours  clotrimazole Lozenge 1 Lozenge Oral five times a day  fluconAZOLE   Tablet 400 milliGRAM(s) Oral daily    2. VOD Prophylaxis: Actigall, Glutamine, Heparin (dosed at 100 units / kg / day)     3. GI Prophylaxis:    famotidine    Tablet 20 milliGRAM(s) Oral two times a day    4. Mouthcare - NS / NaHCO3 rinses, Mycelex, Biotene; Skin care     5. GVHD prophylaxis   TBI day -1   CTX days +3, +4   MMF, tacrolimus to start on day + 5     6. Transfuse & replete electrolytes prn   KCl 20 mEq IV q 2 hours x 3 doses     7. IV hydration, daily weights, strict I&O, prn diuresis   Lasix 40mg IV x 1 0800  Lasix 40mg IV x 1 1600    8. PO intake as tolerated, nutrition follow up as needed, MVI, folic acid     9. Antiemetics, anti-diarrhea medications:   LORazepam   Injectable 1 milliGRAM(s) IV Push every 6 hours PRN  prochlorperazine   Injectable 10 milliGRAM(s) IntraMuscular every 6 hours PRN  ondansetron Injectable (Chemo) 8 milliGRAM(s) IV Push every 8 hours  aprepitant (Chemo) 80 milliGRAM(s) Oral every 24 hours    10. OOB as tolerated, physical therapy consult if needed     11. Monitor coags / fibrinogen 2x week, vitamin K as needed     12. Monitor closely for clinical changes, monitor for fevers     13. Emotional support provided, plan of care discussed and questions addressed     14. Patient education done regarding chemotherapy prep, plan of care, restrictions and discharge planning     15. Continue regular social work input     I have written the above note for Dr. Tina goss performed service with me in the room.   Mila De Guzman NP-C ([phone])    I have seen and examined patient with NP, I agree with above note as scribed.                    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) with Flu / Cy / TBI prep regimen for treatment of severe aplastic anemia    S: Patient seen and examined with HPC Transplant Team:       O: Vitals:   Vital Signs Last 24 Hrs  T(C): 36.7 (08 Jun 2023 05:25), Max: 37.4 (07 Jun 2023 17:17)  T(F): 98.1 (08 Jun 2023 05:25), Max: 99.3 (07 Jun 2023 17:17)  HR: 91 (08 Jun 2023 05:25) (91 - 105)  BP: 114/65 (08 Jun 2023 05:25) (111/57 - 132/66)  BP(mean): --  RR: 18 (08 Jun 2023 05:25) (18 - 19)  SpO2: 98% (08 Jun 2023 05:25) (98% - 100%)    Parameters below as of 08 Jun 2023 05:25  Patient On (Oxygen Delivery Method): room air      Admit weight: 102.8kg   Daily     Daily     Intake / Output:   06-07 @ 07:01  -  06-08 @ 07:00  --------------------------------------------------------  IN: 3248 mL / OUT: 2610 mL / NET: 638 mL      PE:   Oropharynx: Clear  Oral Mucositis: (-)                                                        Grade:   CVS: RRR, +S1,S2  Lungs: CTA B/L  Abdomen: Soft, NT, ND, +BS  Extremities: No edema in BLE  Gastric Mucositis: (-)                                                 Grade:   Intestinal Mucositis:   (-)                                           Grade:   Skin: Intact  TLC: CDI  Neuro: Alert, Ox3  Pain: Denies      Labs:                         7.1    0.51  )-----------( 126      ( 08 Jun 2023 07:03 )             20.2     06-08    137  |  103  |  13  ----------------------------<  89  3.8   |  22  |  0.82    Ca    8.8      08 Jun 2023 07:10  Phos  3.5     06-08  Mg     2.2     06-08    TPro  6.8  /  Alb  3.9  /  TBili  1.1  /  DBili  x   /  AST  39  /  ALT  62<H>  /  AlkPhos  86  06-08      Meds:   Antimicrobials:   acyclovir   Oral Tab/Cap 400 milliGRAM(s) Oral every 8 hours  ciprofloxacin     Tablet 500 milliGRAM(s) Oral every 12 hours  clotrimazole Lozenge 1 Lozenge Oral five times a day  fluconAZOLE   Tablet 400 milliGRAM(s) Oral daily      Heme / Onc:   heparin  Infusion 424 Unit(s)/Hr IV Continuous <Continuous>      GI:  famotidine    Tablet 20 milliGRAM(s) Oral two times a day  senna 1 Tablet(s) Oral at bedtime PRN  sodium bicarbonate Mouth Rinse 10 milliLiter(s) Swish and Spit five times a day  ursodiol Capsule 300 milliGRAM(s) Oral two times a day      Cardiovascular:       Immunologic:       Other medications:   acetaminophen     Tablet .. 650 milliGRAM(s) Oral every 6 hours  acetaminophen   Tablet (Chemo) 650 milliGRAM(s) Oral once  aprepitant (Chemo) 80 milliGRAM(s) Oral every 24 hours  Biotene Dry Mouth Oral Rinse 5 milliLiter(s) Swish and Spit five times a day  Chemotherapy Worklist Order      chlorhexidine 4% Liquid 1 Application(s) Topical <User Schedule>  diphenhydrAMINE  Injectable (Chemo) 25 milliGRAM(s) IV Push once  folic acid 1 milliGRAM(s) Oral daily  hydrocortisone  Injectable (Chemo) 50 milliGRAM(s) IV Push once  Infuse HPC Product      multivitamin 1 Tablet(s) Oral daily  ondansetron Injectable (Chemo) 8 milliGRAM(s) IV Push every 8 hours  sodium bicarbonate  Infusion (Chemo) 0.074 mEq/kG/Hr IV Continuous <Continuous>  sodium chloride 0.9%. 1000 milliLiter(s) IV Continuous <Continuous>  sodium chloride 0.9%. (Chemo) 1000 milliLiter(s) IV Continuous <Continuous>      PRN:   acetaminophen     Tablet .. 650 milliGRAM(s) Oral every 6 hours PRN  diphenhydrAMINE Injectable 25 milliGRAM(s) IV Push every 12 hours PRN  LORazepam   Injectable 1 milliGRAM(s) IV Push every 6 hours PRN  prochlorperazine   Injectable 10 milliGRAM(s) IntraMuscular every 6 hours PRN  senna 1 Tablet(s) Oral at bedtime PRN  sodium chloride 0.9% lock flush 10 milliLiter(s) IV Push every 1 hour PRN      A/P:  37 year old male with severe aplastic anemia with multiple lines of treatment admitted for a haplo-identical bone marrow transplant with  Flu / Cy / TBI preparative regimen   Day 0  start CTX hydration - 1/2NS @ 200ml /hr    Strict I&O, daily weights, prn diuresis   Day -6 - day -2 - Fludarabine 30mg/m2 IV  Day -6 - day - 5 - CTX 14.5mg/kg/day IV. CTX to start after urine SG < 1.010. Mesna  12mg / kg - hemorrhagic cystitis ppx   Day -2 - IVIG 0.4 g/kg (ideal body weight) every 3 weeks. Premedication with Tylenol and benadryl   Day -1 -  cGy x 1 dose   Day - 1 - at 2200 begin transplant hydration : 0.45Ns + 1 amp (50mEq) sodium bicarbonate at 150ml /hr; continue transplant hydration for 24 hours post infusion    Day + 2 at 2200 - begin CTX hydration (0.45NS + 10 mEq KCl/ @150ml /m2  continue 24 hours post last dose of CTX   Day + 3 - + 4 - CTX 50mg/kg/day IV. Begin CTX when urine SG < 1.010. EKG daily. Mesna for hemorraghic cystitis ppx.   Day + 5 - start Zarxio,  MMF and Tacrolimus. Check Tacrolimus levels on Monday and Thursday.   when ANC < 500, start Cipro 500mg po BID. If becomes febrile, pan cx, CXR and change Cipro to Cefepime 2g IV q 8 hours. Continue until count recovery  6/5- Fludarabine 4/5, intermittent nausea, continue antiemetics   6/6- Fludarabine 5/5, IVIG today   6/7 TBI today  6/8- HPC transplant today. Continue transplant hydration for 24 hours post infusion of bone marrow     1. Infectious Disease:   acyclovir   Oral Tab/Cap 400 milliGRAM(s) Oral every 8 hours  ciprofloxacin     Tablet 500 milliGRAM(s) Oral every 12 hours  clotrimazole Lozenge 1 Lozenge Oral five times a day  fluconAZOLE   Tablet 400 milliGRAM(s) Oral daily    2. VOD Prophylaxis: Actigall, Glutamine, Heparin (dosed at 100 units / kg / day)     3. GI Prophylaxis:    famotidine    Tablet 20 milliGRAM(s) Oral two times a day    4. Mouthcare - NS / NaHCO3 rinses, Mycelex, Biotene; Skin care     5. GVHD prophylaxis   TBI day -1   CTX days +3, +4   MMF, tacrolimus to start on day + 5     6. Transfuse & replete electrolytes prn   KCl 20 mEq IV q 2 hours x 3 doses     7. IV hydration, daily weights, strict I&O, prn diuresis   Lasix 40mg IV x 1 0800  Lasix 40mg IV x 1 1600    8. PO intake as tolerated, nutrition follow up as needed, MVI, folic acid     9. Antiemetics, anti-diarrhea medications:   LORazepam   Injectable 1 milliGRAM(s) IV Push every 6 hours PRN  prochlorperazine   Injectable 10 milliGRAM(s) IntraMuscular every 6 hours PRN  ondansetron Injectable (Chemo) 8 milliGRAM(s) IV Push every 8 hours  aprepitant (Chemo) 80 milliGRAM(s) Oral every 24 hours    10. OOB as tolerated, physical therapy consult if needed     11. Monitor coags / fibrinogen 2x week, vitamin K as needed     12. Monitor closely for clinical changes, monitor for fevers     13. Emotional support provided, plan of care discussed and questions addressed     14. Patient education done regarding chemotherapy prep, plan of care, restrictions and discharge planning     15. Continue regular social work input     I have written the above note for Dr. Tina goss performed service with me in the room.   Mila De Guzman NP-C ([phone])    I have seen and examined patient with NP, I agree with above note as scribed.                    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) with Flu / Cy / TBI prep regimen for treatment of severe aplastic anemia    S: Patient seen and examined with HPC Transplant Team:   + persistent nausea  + fatigue     O: Vitals:   Vital Signs Last 24 Hrs  T(C): 36.7 (08 Jun 2023 05:25), Max: 37.4 (07 Jun 2023 17:17)  T(F): 98.1 (08 Jun 2023 05:25), Max: 99.3 (07 Jun 2023 17:17)  HR: 91 (08 Jun 2023 05:25) (91 - 105)  BP: 114/65 (08 Jun 2023 05:25) (111/57 - 132/66)  BP(mean): --  RR: 18 (08 Jun 2023 05:25) (18 - 19)  SpO2: 98% (08 Jun 2023 05:25) (98% - 100%)    Parameters below as of 08 Jun 2023 05:25  Patient On (Oxygen Delivery Method): room air      Admit weight: 102.8kg   Daily     Daily     Intake / Output:   06-07 @ 07:01  -  06-08 @ 07:00  --------------------------------------------------------  IN: 3248 mL / OUT: 2610 mL / NET: 638 mL      PE:   Oropharynx: Clear  Oral Mucositis: (-)                                                        Grade:   CVS: RRR, +S1,S2  Lungs: CTA B/L  Abdomen: Soft, NT, ND, +BS  Extremities: No edema in BLE  Gastric Mucositis: (-)                                                 Grade:   Intestinal Mucositis:   (-)                                           Grade:   Skin: Intact  TLC: CDI  Neuro: Alert, Ox3  Pain: Denies      Labs:                         7.1    0.51  )-----------( 126      ( 08 Jun 2023 07:03 )             20.2     06-08    137  |  103  |  13  ----------------------------<  89  3.8   |  22  |  0.82    Ca    8.8      08 Jun 2023 07:10  Phos  3.5     06-08  Mg     2.2     06-08    TPro  6.8  /  Alb  3.9  /  TBili  1.1  /  DBili  x   /  AST  39  /  ALT  62<H>  /  AlkPhos  86  06-08      Meds:   Antimicrobials:   acyclovir   Oral Tab/Cap 400 milliGRAM(s) Oral every 8 hours  ciprofloxacin     Tablet 500 milliGRAM(s) Oral every 12 hours  clotrimazole Lozenge 1 Lozenge Oral five times a day  fluconAZOLE   Tablet 400 milliGRAM(s) Oral daily      Heme / Onc:   heparin  Infusion 424 Unit(s)/Hr IV Continuous <Continuous>      GI:  famotidine    Tablet 20 milliGRAM(s) Oral two times a day  senna 1 Tablet(s) Oral at bedtime PRN  sodium bicarbonate Mouth Rinse 10 milliLiter(s) Swish and Spit five times a day  ursodiol Capsule 300 milliGRAM(s) Oral two times a day      Cardiovascular:       Immunologic:       Other medications:   acetaminophen     Tablet .. 650 milliGRAM(s) Oral every 6 hours  acetaminophen   Tablet (Chemo) 650 milliGRAM(s) Oral once  aprepitant (Chemo) 80 milliGRAM(s) Oral every 24 hours  Biotene Dry Mouth Oral Rinse 5 milliLiter(s) Swish and Spit five times a day  Chemotherapy Worklist Order      chlorhexidine 4% Liquid 1 Application(s) Topical <User Schedule>  diphenhydrAMINE  Injectable (Chemo) 25 milliGRAM(s) IV Push once  folic acid 1 milliGRAM(s) Oral daily  hydrocortisone  Injectable (Chemo) 50 milliGRAM(s) IV Push once  Infuse HPC Product      multivitamin 1 Tablet(s) Oral daily  ondansetron Injectable (Chemo) 8 milliGRAM(s) IV Push every 8 hours  sodium bicarbonate  Infusion (Chemo) 0.074 mEq/kG/Hr IV Continuous <Continuous>  sodium chloride 0.9%. 1000 milliLiter(s) IV Continuous <Continuous>  sodium chloride 0.9%. (Chemo) 1000 milliLiter(s) IV Continuous <Continuous>      PRN:   acetaminophen     Tablet .. 650 milliGRAM(s) Oral every 6 hours PRN  diphenhydrAMINE Injectable 25 milliGRAM(s) IV Push every 12 hours PRN  LORazepam   Injectable 1 milliGRAM(s) IV Push every 6 hours PRN  prochlorperazine   Injectable 10 milliGRAM(s) IntraMuscular every 6 hours PRN  senna 1 Tablet(s) Oral at bedtime PRN  sodium chloride 0.9% lock flush 10 milliLiter(s) IV Push every 1 hour PRN      A/P:  37 year old male with severe aplastic anemia with multiple lines of treatment admitted for a haplo-identical bone marrow transplant with  Flu / Cy / TBI preparative regimen   Day 0  start CTX hydration - 1/2NS @ 200ml /hr    Strict I&O, daily weights, prn diuresis   Day -6 - day -2 - Fludarabine 30mg/m2 IV  Day -6 - day - 5 - CTX 14.5mg/kg/day IV. CTX to start after urine SG < 1.010. Mesna  12mg / kg - hemorrhagic cystitis ppx   Day -2 - IVIG 0.4 g/kg (ideal body weight) every 3 weeks. Premedication with Tylenol and benadryl   Day -1 -  cGy x 1 dose   Day - 1 - at 2200 begin transplant hydration : 0.45Ns + 1 amp (50mEq) sodium bicarbonate at 150ml /hr; continue transplant hydration for 24 hours post infusion    Day + 2 at 2200 - begin CTX hydration (0.45NS + 10 mEq KCl/ @150ml /m2  continue 24 hours post last dose of CTX   Day + 3 - + 4 - CTX 50mg/kg/day IV. Begin CTX when urine SG < 1.010. EKG daily. Mesna for hemorraghic cystitis ppx.   Day + 5 - start Zarxio,  MMF and Tacrolimus. Check Tacrolimus levels on Monday and Thursday.   when ANC < 500, start Cipro 500mg po BID. If becomes febrile, pan cx, CXR and change Cipro to Cefepime 2g IV q 8 hours. Continue until count recovery  6/5- Fludarabine 4/5, intermittent nausea, continue antiemetics   6/6- Fludarabine 5/5, IVIG today   6/7 TBI today  6/8- HPC transplant today. Continue transplant hydration for 24 hours post infusion of bone marrow     1. Infectious Disease:   acyclovir   Oral Tab/Cap 400 milliGRAM(s) Oral every 8 hours  ciprofloxacin     Tablet 500 milliGRAM(s) Oral every 12 hours  clotrimazole Lozenge 1 Lozenge Oral five times a day  fluconAZOLE   Tablet 400 milliGRAM(s) Oral daily    2. VOD Prophylaxis: Actigall, Glutamine, Heparin (dosed at 100 units / kg / day)     3. GI Prophylaxis:    famotidine    Tablet 20 milliGRAM(s) Oral two times a day    4. Mouthcare - NS / NaHCO3 rinses, Mycelex, Biotene; Skin care     5. GVHD prophylaxis   TBI day -1   CTX days +3, +4   MMF, tacrolimus to start on day + 5     6. Transfuse & replete electrolytes prn   KCl 20 mEq IV q 2 hours x 3 doses     7. IV hydration, daily weights, strict I&O, prn diuresis   Lasix 40mg IV x 1 0800  Lasix 40mg IV x 1 1600    8. PO intake as tolerated, nutrition follow up as needed, MVI, folic acid     9. Antiemetics, anti-diarrhea medications:   LORazepam   Injectable 1 milliGRAM(s) IV Push every 6 hours PRN  prochlorperazine   Injectable 10 milliGRAM(s) IntraMuscular every 6 hours PRN  ondansetron Injectable (Chemo) 8 milliGRAM(s) IV Push every 8 hours  aprepitant (Chemo) 80 milliGRAM(s) Oral every 24 hours    10. OOB as tolerated, physical therapy consult if needed     11. Monitor coags / fibrinogen 2x week, vitamin K as needed     12. Monitor closely for clinical changes, monitor for fevers     13. Emotional support provided, plan of care discussed and questions addressed     14. Patient education done regarding chemotherapy prep, plan of care, restrictions and discharge planning     15. Continue regular social work input     I have written the above note for Dr. Tina goss performed service with me in the room.   Mila De Guzman NP-C ([phone])    I have seen and examined patient with NP, I agree with above note as scribed.

## 2023-06-08 NOTE — PHARMACOTHERAPY INTERVENTION NOTE - COMMENTS
37-year-old male with severe aplastic anemia previously treated with eATG +CsA + eltrombopag in April 2020. He relapsed November 2021 and was treated with rATG + CSA + prednisone + eltombopag and then ravulizumab. He is here for a haploidentical allogeneic stem cell transplant, today is day 0. Course has been complicated by intermittent nausea. Recommend changing route of prochlorperazine from IM to IV. 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 Would recommend an EKG to assess QTc in the setting of multiple QTc prolonging medications if starting.    Asmita Carranza, PharmD, [facility]  Stem Cell Transplant Clinical Pharmacy Specialist

## 2023-06-08 NOTE — CHART NOTE - NSCHARTNOTEFT_GEN_A_CORE
After pre-medication Mr. Puckett received 548 mL of Allogeneic related, Fresh, Pooled, Plasma reduced, Haplo HCP marrow for over approximately 90 min. Cell counts as follows  Total MNC( x10^8/kg)=21.08  CD34+cells ( x10^6 kg)=1.65  CD3+cells( x10^7)=2.94  Cell Viability (%)= 99  During the infusion Mr. Puckett developed hives and required additional dose of benadryl and hydrocortisone injection.     Adri Gracia PA-C  Stem cell transplant   [phone]

## 2023-06-09 LAB
ALBUMIN SERPL ELPH-MCNC: 4.1 G/DL — SIGNIFICANT CHANGE UP (ref 3.3–5)
ALP SERPL-CCNC: 83 U/L — SIGNIFICANT CHANGE UP (ref 40–120)
ALT FLD-CCNC: 56 U/L — HIGH (ref 10–45)
ANION GAP SERPL CALC-SCNC: 13 MMOL/L — SIGNIFICANT CHANGE UP (ref 5–17)
AST SERPL-CCNC: 29 U/L — SIGNIFICANT CHANGE UP (ref 10–40)
BASOPHILS # BLD AUTO: 0 K/UL — SIGNIFICANT CHANGE UP (ref 0–0.2)
BASOPHILS NFR BLD AUTO: 0 % — SIGNIFICANT CHANGE UP (ref 0–2)
BILIRUB DIRECT SERPL-MCNC: 0.2 MG/DL — SIGNIFICANT CHANGE UP (ref 0–0.3)
BILIRUB INDIRECT FLD-MCNC: 0.9 MG/DL — SIGNIFICANT CHANGE UP (ref 0.2–1)
BILIRUB SERPL-MCNC: 1.1 MG/DL — SIGNIFICANT CHANGE UP (ref 0.2–1.2)
BLD GP AB SCN SERPL QL: NEGATIVE — SIGNIFICANT CHANGE UP
BUN SERPL-MCNC: 13 MG/DL — SIGNIFICANT CHANGE UP (ref 7–23)
CALCIUM SERPL-MCNC: 8.9 MG/DL — SIGNIFICANT CHANGE UP (ref 8.4–10.5)
CHLORIDE SERPL-SCNC: 100 MMOL/L — SIGNIFICANT CHANGE UP (ref 96–108)
CO2 SERPL-SCNC: 25 MMOL/L — SIGNIFICANT CHANGE UP (ref 22–31)
CREAT SERPL-MCNC: 0.8 MG/DL — SIGNIFICANT CHANGE UP (ref 0.5–1.3)
EGFR: 118 ML/MIN/1.73M2 — SIGNIFICANT CHANGE UP
EOSINOPHIL # BLD AUTO: 0.02 K/UL — SIGNIFICANT CHANGE UP (ref 0–0.5)
EOSINOPHIL NFR BLD AUTO: 1.5 % — SIGNIFICANT CHANGE UP (ref 0–6)
GLUCOSE SERPL-MCNC: 89 MG/DL — SIGNIFICANT CHANGE UP (ref 70–99)
HCT VFR BLD CALC: 23 % — LOW (ref 39–50)
HGB BLD-MCNC: 8 G/DL — LOW (ref 13–17)
IMM GRANULOCYTES NFR BLD AUTO: 0.8 % — SIGNIFICANT CHANGE UP (ref 0–0.9)
LDH SERPL L TO P-CCNC: 288 U/L — HIGH (ref 50–242)
LYMPHOCYTES # BLD AUTO: 0.03 K/UL — LOW (ref 1–3.3)
LYMPHOCYTES # BLD AUTO: 2.3 % — LOW (ref 13–44)
MAGNESIUM SERPL-MCNC: 2.2 MG/DL — SIGNIFICANT CHANGE UP (ref 1.6–2.6)
MCHC RBC-ENTMCNC: 32.4 PG — SIGNIFICANT CHANGE UP (ref 27–34)
MCHC RBC-ENTMCNC: 34.8 GM/DL — SIGNIFICANT CHANGE UP (ref 32–36)
MCV RBC AUTO: 93.1 FL — SIGNIFICANT CHANGE UP (ref 80–100)
MONOCYTES # BLD AUTO: 0.09 K/UL — SIGNIFICANT CHANGE UP (ref 0–0.9)
MONOCYTES NFR BLD AUTO: 6.9 % — SIGNIFICANT CHANGE UP (ref 2–14)
NEUTROPHILS # BLD AUTO: 1.16 K/UL — LOW (ref 1.8–7.4)
NEUTROPHILS NFR BLD AUTO: 88.5 % — HIGH (ref 43–77)
NRBC # BLD: 0 /100 WBCS — SIGNIFICANT CHANGE UP (ref 0–0)
PHOSPHATE SERPL-MCNC: 3.1 MG/DL — SIGNIFICANT CHANGE UP (ref 2.5–4.5)
PLATELET # BLD AUTO: 114 K/UL — LOW (ref 150–400)
POTASSIUM SERPL-MCNC: 3.4 MMOL/L — LOW (ref 3.5–5.3)
POTASSIUM SERPL-SCNC: 3.4 MMOL/L — LOW (ref 3.5–5.3)
PROT SERPL-MCNC: 6.7 G/DL — SIGNIFICANT CHANGE UP (ref 6–8.3)
RBC # BLD: 2.47 M/UL — LOW (ref 4.2–5.8)
RBC # FLD: 17.2 % — HIGH (ref 10.3–14.5)
RH IG SCN BLD-IMP: POSITIVE — SIGNIFICANT CHANGE UP
SODIUM SERPL-SCNC: 138 MMOL/L — SIGNIFICANT CHANGE UP (ref 135–145)
WBC # BLD: 1.31 K/UL — LOW (ref 3.8–10.5)
WBC # FLD AUTO: 1.31 K/UL — LOW (ref 3.8–10.5)

## 2023-06-09 PROCEDURE — 99291 CRITICAL CARE FIRST HOUR: CPT

## 2023-06-09 RX ORDER — FUROSEMIDE 40 MG
40 TABLET ORAL ONCE
Refills: 0 | Status: COMPLETED | OUTPATIENT
Start: 2023-06-09 | End: 2023-06-09

## 2023-06-09 RX ORDER — POTASSIUM CHLORIDE 20 MEQ
20 PACKET (EA) ORAL
Refills: 0 | Status: COMPLETED | OUTPATIENT
Start: 2023-06-09 | End: 2023-06-09

## 2023-06-09 RX ORDER — FUROSEMIDE 40 MG
40 TABLET ORAL ONCE
Refills: 0 | Status: COMPLETED | OUTPATIENT
Start: 2023-06-11 | End: 2023-06-11

## 2023-06-09 RX ADMIN — ONDANSETRON 8 MILLIGRAM(S): 8 TABLET, FILM COATED ORAL at 06:41

## 2023-06-09 RX ADMIN — ONDANSETRON 8 MILLIGRAM(S): 8 TABLET, FILM COATED ORAL at 21:46

## 2023-06-09 RX ADMIN — Medication 10 MILLILITER(S): at 00:35

## 2023-06-09 RX ADMIN — APREPITANT 80 MILLIGRAM(S): 80 CAPSULE ORAL at 12:31

## 2023-06-09 RX ADMIN — Medication 50 MILLIEQUIVALENT(S): at 12:31

## 2023-06-09 RX ADMIN — URSODIOL 300 MILLIGRAM(S): 250 TABLET, FILM COATED ORAL at 06:41

## 2023-06-09 RX ADMIN — FLUCONAZOLE 400 MILLIGRAM(S): 150 TABLET ORAL at 12:31

## 2023-06-09 RX ADMIN — CHLORHEXIDINE GLUCONATE 1 APPLICATION(S): 213 SOLUTION TOPICAL at 06:41

## 2023-06-09 RX ADMIN — Medication 50 MILLIEQUIVALENT(S): at 15:15

## 2023-06-09 RX ADMIN — Medication 400 MILLIGRAM(S): at 21:46

## 2023-06-09 RX ADMIN — FAMOTIDINE 20 MILLIGRAM(S): 10 INJECTION INTRAVENOUS at 17:01

## 2023-06-09 RX ADMIN — Medication 10 MILLILITER(S): at 15:16

## 2023-06-09 RX ADMIN — Medication 400 MILLIGRAM(S): at 06:41

## 2023-06-09 RX ADMIN — Medication 650 MILLIGRAM(S): at 09:50

## 2023-06-09 RX ADMIN — ONDANSETRON 8 MILLIGRAM(S): 8 TABLET, FILM COATED ORAL at 15:14

## 2023-06-09 RX ADMIN — URSODIOL 300 MILLIGRAM(S): 250 TABLET, FILM COATED ORAL at 17:01

## 2023-06-09 RX ADMIN — Medication 5 MILLILITER(S): at 15:15

## 2023-06-09 RX ADMIN — Medication 500 MILLIGRAM(S): at 17:01

## 2023-06-09 RX ADMIN — Medication 5 MILLILITER(S): at 00:35

## 2023-06-09 RX ADMIN — Medication 5 MILLILITER(S): at 07:47

## 2023-06-09 RX ADMIN — Medication 10 MILLILITER(S): at 07:48

## 2023-06-09 RX ADMIN — Medication 1 LOZENGE: at 00:35

## 2023-06-09 RX ADMIN — Medication 1 LOZENGE: at 15:15

## 2023-06-09 RX ADMIN — Medication 1 LOZENGE: at 07:47

## 2023-06-09 RX ADMIN — Medication 10 MILLILITER(S): at 20:37

## 2023-06-09 RX ADMIN — FAMOTIDINE 20 MILLIGRAM(S): 10 INJECTION INTRAVENOUS at 06:41

## 2023-06-09 RX ADMIN — Medication 50 MILLIEQUIVALENT(S): at 09:16

## 2023-06-09 RX ADMIN — Medication 650 MILLIGRAM(S): at 08:37

## 2023-06-09 RX ADMIN — Medication 1 LOZENGE: at 12:30

## 2023-06-09 RX ADMIN — Medication 500 MILLIGRAM(S): at 06:41

## 2023-06-09 RX ADMIN — Medication 5 MILLILITER(S): at 20:37

## 2023-06-09 RX ADMIN — Medication 10 MILLILITER(S): at 12:30

## 2023-06-09 RX ADMIN — Medication 40 MILLIGRAM(S): at 09:16

## 2023-06-09 RX ADMIN — Medication 400 MILLIGRAM(S): at 15:14

## 2023-06-09 RX ADMIN — Medication 10 MILLIGRAM(S): at 16:58

## 2023-06-09 RX ADMIN — Medication 1 LOZENGE: at 20:37

## 2023-06-09 RX ADMIN — Medication 1 MILLIGRAM(S): at 12:31

## 2023-06-09 RX ADMIN — Medication 1 TABLET(S): at 12:31

## 2023-06-09 RX ADMIN — Medication 5 MILLILITER(S): at 12:30

## 2023-06-09 RX ADMIN — SODIUM CHLORIDE 20 MILLILITER(S): 9 INJECTION INTRAMUSCULAR; INTRAVENOUS; SUBCUTANEOUS at 16:58

## 2023-06-09 NOTE — PROGRESS NOTE ADULT - SUBJECTIVE AND OBJECTIVE BOX
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 () with Flu / Cy / TBI prep regimen for treatment of severe aplastic anemia    S: Patient seen and examined with HPC Transplant Team:       O: Vitals:   Vital Signs Last 24 Hrs  T(C): 36.8 (2023 06:00), Max: 37.1 (2023 14:30)  T(F): 98.2 (2023 06:00), Max: 98.8 (2023 14:30)  HR: 88 (2023 06:00) (78 - 93)  BP: 121/75 (2023 06:00) (100/65 - 136/78)  BP(mean): --  RR: 18 (2023 06:00) (16 - 18)  SpO2: 96% (2023 06:00) (96% - 99%)    Parameters below as of 2023 21:15  Patient On (Oxygen Delivery Method): room air        Admit weight: 102.8kg   Daily     Daily Weight in k.2 (2023 10:05)    Intake / Output:   06-08 @ 07:01  -  06-09 @ 07:00  --------------------------------------------------------  IN: 5293 mL / OUT: 6450 mL / NET: -1157 mL      PE:   Oropharynx: Clear  Oral Mucositis: (-)                                                        Grade:   CVS: RRR, +S1,S2  Lungs: CTA B/L  Abdomen: Soft, NT, ND, +BS  Extremities: No edema in BLE  Gastric Mucositis: (-)                                                 Grade:   Intestinal Mucositis:   (-)                                           Grade:   Skin: Intact  TLC: CDI  Neuro: Alert, Ox3  Pain: Denies      Labs:       Meds:   Antimicrobials:   acyclovir   Oral Tab/Cap 400 milliGRAM(s) Oral every 8 hours  ciprofloxacin     Tablet 500 milliGRAM(s) Oral every 12 hours  clotrimazole Lozenge 1 Lozenge Oral five times a day  fluconAZOLE   Tablet 400 milliGRAM(s) Oral daily      Heme / Onc:   heparin  Infusion 424 Unit(s)/Hr IV Continuous <Continuous>      GI:  famotidine    Tablet 20 milliGRAM(s) Oral two times a day  senna 1 Tablet(s) Oral at bedtime PRN  sodium bicarbonate Mouth Rinse 10 milliLiter(s) Swish and Spit five times a day  ursodiol Capsule 300 milliGRAM(s) Oral two times a day      Cardiovascular:       Immunologic:       Other medications:   acetaminophen     Tablet .. 650 milliGRAM(s) Oral every 6 hours  aprepitant (Chemo) 80 milliGRAM(s) Oral every 24 hours  Biotene Dry Mouth Oral Rinse 5 milliLiter(s) Swish and Spit five times a day  Chemotherapy Worklist Order      chlorhexidine 4% Liquid 1 Application(s) Topical <User Schedule>  folic acid 1 milliGRAM(s) Oral daily  Infuse HPC Product      multivitamin 1 Tablet(s) Oral daily  ondansetron Injectable (Chemo) 8 milliGRAM(s) IV Push every 8 hours  sodium bicarbonate  Infusion (Chemo) 0.074 mEq/kG/Hr IV Continuous <Continuous>  sodium chloride 0.9%. 1000 milliLiter(s) IV Continuous <Continuous>  sodium chloride 0.9%. (Chemo) 1000 milliLiter(s) IV Continuous <Continuous>      PRN:   acetaminophen     Tablet .. 650 milliGRAM(s) Oral every 6 hours PRN  diphenhydrAMINE Injectable 25 milliGRAM(s) IV Push every 12 hours PRN  LORazepam   Injectable 1 milliGRAM(s) IV Push every 6 hours PRN  prochlorperazine   Injectable 10 milliGRAM(s) IV Push every 6 hours PRN  senna 1 Tablet(s) Oral at bedtime PRN  sodium chloride 0.9% lock flush 10 milliLiter(s) IV Push every 1 hour PRN    A/P:  37 year old male with severe aplastic anemia with multiple lines of treatment admitted for a haplo-identical bone marrow transplant with  Flu / Cy / TBI preparative regimen   Day + 1  start CTX hydration - 1/2NS @ 200ml /hr    Strict I&O, daily weights, prn diuresis   Day -6 - day -2 - Fludarabine 30mg/m2 IV  Day -6 - day - 5 - CTX 14.5mg/kg/day IV. CTX to start after urine SG < 1.010. Mesna  12mg / kg - hemorrhagic cystitis ppx   Day -2 - IVIG 0.4 g/kg (ideal body weight) every 3 weeks. Premedication with Tylenol and benadryl   Day -1 -  cGy x 1 dose   Day - 1 - at 2200 begin transplant hydration : 0.45Ns + 1 amp (50mEq) sodium bicarbonate at 150ml /hr; continue transplant hydration for 24 hours post infusion    Day + 2 at 2200 - begin CTX hydration (0.45NS + 10 mEq KCl/ @150ml /m2  continue 24 hours post last dose of CTX   Day + 3 - + 4 - CTX 50mg/kg/day IV. Begin CTX when urine SG < 1.010. EKG daily. Mes[place] for hemorraghic cystitis ppx.   Day + 5 - start Zarxio,  MMF and Tacrolimus. Check Tacrolimus levels on Monday and Thursday.   when ANC < 500, start Cipro 500mg po BID. If becomes febrile, pan cx, CXR and change Cipro to Cefepime 2g IV q 8 hours. Continue until count recovery  - Fludarabine , intermittent nausea, continue antiemetics   - Fludarabine , IVIG today    TBI today  - HPC transplant today. Continue transplant hydration for 24 hours post infusion of bone marrow   - monitor for CRS / haplo storm - if T >/= 102.5 on days +1 or +2, would dose tocilizumab 8mg / kg IVPB x 1     1. Infectious Disease:   acyclovir   Oral Tab/Cap 400 milliGRAM(s) Oral every 8 hours  ciprofloxacin     Tablet 500 milliGRAM(s) Oral every 12 hours  clotrimazole Lozenge 1 Lozenge Oral five times a day  fluconAZOLE   Tablet 400 milliGRAM(s) Oral daily    2. VOD Prophylaxis: Actigall, Glutamine, Heparin (dosed at 100 units / kg / day)     3. GI Prophylaxis:   famotidine    Tablet 20 milliGRAM(s) Oral two times a day    4. Mouthcare - NS / NaHCO3 rinses, Mycelex, Biotene; Skin care     5. GVHD prophylaxis   TBI day -1   CTX days +3, +4  MMF, tacrolimus to start on day +5     6. Transfuse & replete electrolytes prn     7. IV hydration, daily weights, strict I&O, prn diuresis     8. PO intake as tolerated, nutrition follow up as needed, MVI, folic acid     9. Antiemetics, anti-diarrhea medications:   LORazepam   Injectable 1 milliGRAM(s) IV Push every 6 hours PRN  prochlorperazine   Injectable 10 milliGRAM(s) IV Push every 6 hours PRN  ondansetron Injectable (Chemo) 8 milliGRAM(s) IV Push every 8 hours  aprepitant (Chemo) 80 milliGRAM(s) Oral every 24 hours    10. OOB as tolerated, physical therapy consult if needed     11. Monitor coags / fibrinogen 2x week, vitamin K as needed     12. Monitor closely for clinical changes, monitor for fevers     13. Emotional support provided, plan of care discussed and questions addressed     14. Patient education done regarding chemotherapy prep, plan of care, restrictions and discharge planning     15. Continue regular social work input     I have written the above note for Dr. Wilder who performed service with me in the room.   Mila De Guzman NP-C ([phone])    I have seen and examined patient with NP, I agree with above note as scribed.                    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 () with Flu / Cy / TBI prep regimen for treatment of severe aplastic anemia    S: Patient seen and examined with HPC Transplant Team:       O: Vitals:   Vital Signs Last 24 Hrs  T(C): 36.8 (2023 06:00), Max: 37.1 (2023 14:30)  T(F): 98.2 (2023 06:00), Max: 98.8 (2023 14:30)  HR: 88 (2023 06:00) (78 - 93)  BP: 121/75 (2023 06:00) (100/65 - 136/78)  BP(mean): --  RR: 18 (2023 06:00) (16 - 18)  SpO2: 96% (2023 06:00) (96% - 99%)    Parameters below as of 2023 21:15  Patient On (Oxygen Delivery Method): room air        Admit weight: 102.8kg   Daily     Daily Weight in k.2 (2023 10:05)    Intake / Output:   06-08 @ 07:01  -  06-09 @ 07:00  --------------------------------------------------------  IN: 5293 mL / OUT: 6450 mL / NET: -1157 mL      PE:   Oropharynx: Clear  Oral Mucositis: (-)                                                        Grade:   CVS: RRR, +S1,S2  Lungs: CTA B/L  Abdomen: Soft, NT, ND, +BS  Extremities: No edema in BLE  Gastric Mucositis: (-)                                                 Grade:   Intestinal Mucositis:   (-)                                           Grade:   Skin: Intact  TLC: CDI  Neuro: Alert, Ox3  Pain: Denies      Labs:                         8.0    1.31  )-----------( 114      ( 2023 07:14 )             23.0     06-09    138  |  100  |  13  ----------------------------<  89  3.4<L>   |  25  |  0.80    Ca    8.9      2023 07:12  Phos  3.1       Mg     2.2         TPro  6.7  /  Alb  4.1  /  TBili  1.1  /  DBili  0.2  /  AST  29  /  ALT  56<H>  /  AlkPhos  83        Meds:   Antimicrobials:   acyclovir   Oral Tab/Cap 400 milliGRAM(s) Oral every 8 hours  ciprofloxacin     Tablet 500 milliGRAM(s) Oral every 12 hours  clotrimazole Lozenge 1 Lozenge Oral five times a day  fluconAZOLE   Tablet 400 milliGRAM(s) Oral daily      Heme / Onc:   heparin  Infusion 424 Unit(s)/Hr IV Continuous <Continuous>      GI:  famotidine    Tablet 20 milliGRAM(s) Oral two times a day  senna 1 Tablet(s) Oral at bedtime PRN  sodium bicarbonate Mouth Rinse 10 milliLiter(s) Swish and Spit five times a day  ursodiol Capsule 300 milliGRAM(s) Oral two times a day      Cardiovascular:       Immunologic:       Other medications:   acetaminophen     Tablet .. 650 milliGRAM(s) Oral every 6 hours  aprepitant (Chemo) 80 milliGRAM(s) Oral every 24 hours  Biotene Dry Mouth Oral Rinse 5 milliLiter(s) Swish and Spit five times a day  Chemotherapy Worklist Order      chlorhexidine 4% Liquid 1 Application(s) Topical <User Schedule>  folic acid 1 milliGRAM(s) Oral daily  Infuse HPC Product      multivitamin 1 Tablet(s) Oral daily  ondansetron Injectable (Chemo) 8 milliGRAM(s) IV Push every 8 hours  sodium bicarbonate  Infusion (Chemo) 0.074 mEq/kG/Hr IV Continuous <Continuous>  sodium chloride 0.9%. 1000 milliLiter(s) IV Continuous <Continuous>  sodium chloride 0.9%. (Chemo) 1000 milliLiter(s) IV Continuous <Continuous>      PRN:   acetaminophen     Tablet .. 650 milliGRAM(s) Oral every 6 hours PRN  diphenhydrAMINE Injectable 25 milliGRAM(s) IV Push every 12 hours PRN  LORazepam   Injectable 1 milliGRAM(s) IV Push every 6 hours PRN  prochlorperazine   Injectable 10 milliGRAM(s) IV Push every 6 hours PRN  senna 1 Tablet(s) Oral at bedtime PRN  sodium chloride 0.9% lock flush 10 milliLiter(s) IV Push every 1 hour PRN    A/P:  37 year old male with severe aplastic anemia with multiple lines of treatment admitted for a haplo-identical bone marrow transplant with  Flu / Cy / TBI preparative regimen   Day + 1  start CTX hydration - 1/2NS @ 200ml /hr    Strict I&O, daily weights, prn diuresis   Day -6 - day -2 - Fludarabine 30mg/m2 IV  Day -6 - day - 5 - CTX 14.5mg/kg/day IV. CTX to start after urine SG < 1.010. Mes[place]  12mg / kg - hemorrhagic cystitis ppx   Day -2 - IVIG 0.4 g/kg (ideal body weight) every 3 weeks. Premedication with Tylenol and benadryl   Day -1 -  cGy x 1 dose   Day - 1 - at 2200 begin transplant hydration : 0.45Ns + 1 amp (50mEq) sodium bicarbonate at 150ml /hr; continue transplant hydration for 24 hours post infusion    Day + 2 at 2200 - begin CTX hydration (0.45NS + 10 mEq KCl/ @150ml /m2  continue 24 hours post last dose of CTX   Day + 3 - + 4 - CTX 50mg/kg/day IV. Begin CTX when urine SG < 1.010. EKG daily. Mesna for hemorraghic cystitis ppx.   Day + 5 - start Zarxio,  MMF and Tacrolimus. Check Tacrolimus levels on Monday and Thursday.   when ANC < 500, start Cipro 500mg po BID. If becomes febrile, pan cx, CXR and change Cipro to Cefepime 2g IV q 8 hours. Continue until count recovery  - Fludarabine , intermittent nausea, continue antiemetics   - Fludarabine , IVIG today   7 TBI today  - HPC transplant today. Continue transplant hydration for 24 hours post infusion of bone marrow   - monitor for CRS / haplo storm - if T >/= 102.5 on days +1 or +2, would dose tocilizumab 8mg / kg IVPB x 1     1. Infectious Disease:   acyclovir   Oral Tab/Cap 400 milliGRAM(s) Oral every 8 hours  ciprofloxacin     Tablet 500 milliGRAM(s) Oral every 12 hours  clotrimazole Lozenge 1 Lozenge Oral five times a day  fluconAZOLE   Tablet 400 milliGRAM(s) Oral daily    2. VOD Prophylaxis: Actigall, Glutamine, Heparin (dosed at 100 units / kg / day)     3. GI Prophylaxis:   famotidine    Tablet 20 milliGRAM(s) Oral two times a day    4. Mouthcare - NS / NaHCO3 rinses, Mycelex, Biotene; Skin care     5. GVHD prophylaxis   TBI day -1   CTX days +3, +4  MMF, tacrolimus to start on day +5     6. Transfuse & replete electrolytes prn   Lasix 40mg IV x 1   KCl 20mEq IV q 2 hours x 3 doses     7. IV hydration, daily weights, strict I&O, prn diuresis     8. PO intake as tolerated, nutrition follow up as needed, MVI, folic acid     9. Antiemetics, anti-diarrhea medications:   LORazepam   Injectable 1 milliGRAM(s) IV Push every 6 hours PRN  prochlorperazine   Injectable 10 milliGRAM(s) IV Push every 6 hours PRN  ondansetron Injectable (Chemo) 8 milliGRAM(s) IV Push every 8 hours  aprepitant (Chemo) 80 milliGRAM(s) Oral every 24 hours    10. OOB as tolerated, physical therapy consult if needed     11. Monitor coags / fibrinogen 2x week, vitamin K as needed     12. Monitor closely for clinical changes, monitor for fevers     13. Emotional support provided, plan of care discussed and questions addressed     14. Patient education done regarding chemotherapy prep, plan of care, restrictions and discharge planning     15. Continue regular social work input     I have written the above note for Dr. Wilder who performed service with me in the room.   Mila De Guzman NP-C ([phone])    I have seen and examined patient with NP, I agree with above note as scribed.                    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 () with Flu / Cy / TBI prep regimen for treatment of severe aplastic anemia    S: Patient seen and examined with HPC Transplant Team:   +codie    O: Vitals:   Vital Signs Last 24 Hrs  T(C): 36.8 (2023 06:00), Max: 37.1 (2023 14:30)  T(F): 98.2 (2023 06:00), Max: 98.8 (2023 14:30)  HR: 88 (2023 06:00) (78 - 93)  BP: 121/75 (2023 06:00) (100/65 - 136/78)  BP(mean): --  RR: 18 (2023 06:00) (16 - 18)  SpO2: 96% (2023 06:00) (96% - 99%)    Parameters below as of 2023 21:15  Patient On (Oxygen Delivery Method): room air        Admit weight: 102.8kg   Daily     Daily Weight in k.2 (2023 10:05)    Intake / Output:   -08 @ 07:01  -  06-09 @ 07:00  --------------------------------------------------------  IN: 5293 mL / OUT: 6450 mL / NET: -1157 mL      PE:   Oropharynx: Clear  Oral Mucositis: (-)                                                        Grade:   CVS: RRR, +S1,S2  Lungs: CTA B/L  Abdomen: Soft, NT, ND, +BS  Extremities: No edema in BLE  Gastric Mucositis: (-)                                                 Grade:   Intestinal Mucositis:   (-)                                           Grade:   Skin: Intact  TLC: CDI  Neuro: Alert, Ox3  Pain: Denies      Labs:                         8.0    1.31  )-----------( 114      ( 2023 07:14 )             23.0     06-09    138  |  100  |  13  ----------------------------<  89  3.4<L>   |  25  |  0.80    Ca    8.9      2023 07:12  Phos  3.1       Mg     2.2         TPro  6.7  /  Alb  4.1  /  TBili  1.1  /  DBili  0.2  /  AST  29  /  ALT  56<H>  /  AlkPhos  83        Meds:   Antimicrobials:   acyclovir   Oral Tab/Cap 400 milliGRAM(s) Oral every 8 hours  ciprofloxacin     Tablet 500 milliGRAM(s) Oral every 12 hours  clotrimazole Lozenge 1 Lozenge Oral five times a day  fluconAZOLE   Tablet 400 milliGRAM(s) Oral daily      Heme / Onc:   heparin  Infusion 424 Unit(s)/Hr IV Continuous <Continuous>      GI:  famotidine    Tablet 20 milliGRAM(s) Oral two times a day  senna 1 Tablet(s) Oral at bedtime PRN  sodium bicarbonate Mouth Rinse 10 milliLiter(s) Swish and Spit five times a day  ursodiol Capsule 300 milliGRAM(s) Oral two times a day      Cardiovascular:       Immunologic:       Other medications:   acetaminophen     Tablet .. 650 milliGRAM(s) Oral every 6 hours  aprepitant (Chemo) 80 milliGRAM(s) Oral every 24 hours  Biotene Dry Mouth Oral Rinse 5 milliLiter(s) Swish and Spit five times a day  Chemotherapy Worklist Order      chlorhexidine 4% Liquid 1 Application(s) Topical <User Schedule>  folic acid 1 milliGRAM(s) Oral daily  Infuse HPC Product      multivitamin 1 Tablet(s) Oral daily  ondansetron Injectable (Chemo) 8 milliGRAM(s) IV Push every 8 hours  sodium bicarbonate  Infusion (Chemo) 0.074 mEq/kG/Hr IV Continuous <Continuous>  sodium chloride 0.9%. 1000 milliLiter(s) IV Continuous <Continuous>  sodium chloride 0.9%. (Chemo) 1000 milliLiter(s) IV Continuous <Continuous>      PRN:   acetaminophen     Tablet .. 650 milliGRAM(s) Oral every 6 hours PRN  diphenhydrAMINE Injectable 25 milliGRAM(s) IV Push every 12 hours PRN  LORazepam   Injectable 1 milliGRAM(s) IV Push every 6 hours PRN  prochlorperazine   Injectable 10 milliGRAM(s) IV Push every 6 hours PRN  senna 1 Tablet(s) Oral at bedtime PRN  sodium chloride 0.9% lock flush 10 milliLiter(s) IV Push every 1 hour PRN    A/P:  37 year old male with severe aplastic anemia with multiple lines of treatment admitted for a haplo-identical bone marrow transplant with  Flu / Cy / TBI preparative regimen   Day + 1  start CTX hydration - 1/2NS @ 200ml /hr    Strict I&O, daily weights, prn diuresis   Day -6 - day -2 - Fludarabine 30mg/m2 IV  Day -6 - day - 5 - CTX 14.5mg/kg/day IV. CTX to start after urine SG < 1.010. Mes[place]  12mg / kg - hemorrhagic cystitis ppx   Day -2 - IVIG 0.4 g/kg (ideal body weight) every 3 weeks. Premedication with Tylenol and benadryl   Day -1 -  cGy x 1 dose   Day - 1 - at 2200 begin transplant hydration : 0.45Ns + 1 amp (50mEq) sodium bicarbonate at 150ml /hr; continue transplant hydration for 24 hours post infusion    Day + 2 at 2200 - begin CTX hydration (0.45NS + 10 mEq KCl/ @150ml /m2  continue 24 hours post last dose of CTX   Day + 3 - + 4 - CTX 50mg/kg/day IV. Begin CTX when urine SG < 1.010. EKG daily. Mesna for hemorraghic cystitis ppx.   Day + 5 - start Zarxio,  MMF and Tacrolimus. Check Tacrolimus levels on Monday and Thursday.   when ANC < 500, start Cipro 500mg po BID. If becomes febrile, pan cx, CXR and change Cipro to Cefepime 2g IV q 8 hours. Continue until count recovery  - Fludarabine , intermittent nausea, continue antiemetics   - Fludarabine , IVIG today    TBI today  - HPC transplant today. Continue transplant hydration for 24 hours post infusion of bone marrow   - monitor for CRS / haplo storm - if T >/= 102.5 on days +1 or +2, would dose tocilizumab 8mg / kg IVPB x 1     1. Infectious Disease:   acyclovir   Oral Tab/Cap 400 milliGRAM(s) Oral every 8 hours  ciprofloxacin     Tablet 500 milliGRAM(s) Oral every 12 hours  clotrimazole Lozenge 1 Lozenge Oral five times a day  fluconAZOLE   Tablet 400 milliGRAM(s) Oral daily    2. VOD Prophylaxis: Actigall, Glutamine, Heparin (dosed at 100 units / kg / day)     3. GI Prophylaxis:   famotidine    Tablet 20 milliGRAM(s) Oral two times a day    4. Mouthcare - NS / NaHCO3 rinses, Mycelex, Biotene; Skin care     5. GVHD prophylaxis   TBI day -1   CTX days +3, +4  MMF, tacrolimus to start on day +5     6. Transfuse & replete electrolytes prn   Lasix 40mg IV x 1   KCl 20mEq IV q 2 hours x 3 doses     7. IV hydration, daily weights, strict I&O, prn diuresis     8. PO intake as tolerated, nutrition follow up as needed, MVI, folic acid     9. Antiemetics, anti-diarrhea medications:   LORazepam   Injectable 1 milliGRAM(s) IV Push every 6 hours PRN  prochlorperazine   Injectable 10 milliGRAM(s) IV Push every 6 hours PRN  ondansetron Injectable (Chemo) 8 milliGRAM(s) IV Push every 8 hours  aprepitant (Chemo) 80 milliGRAM(s) Oral every 24 hours    10. OOB as tolerated, physical therapy consult if needed     11. Monitor coags / fibrinogen 2x week, vitamin K as needed     12. Monitor closely for clinical changes, monitor for fevers     13. Emotional support provided, plan of care discussed and questions addressed     14. Patient education done regarding chemotherapy prep, plan of care, restrictions and discharge planning     15. Continue regular social work input     I have written the above note for Dr. Wilder who performed service with me in the room.   Mila De Guzman NP-C ([phone])    I have seen and examined patient with NP, I agree with above note as scribed.

## 2023-06-09 NOTE — PROGRESS NOTE ADULT - NS ATTEND AMEND GEN_ALL_CORE FT
1. Admit to BMTU Today is day + 2  2. TLC placement in IR   3. Day -6 - day + 5 - antiemetics   4. Day - 6 start CTX hydration - 1/2NS @ 200ml /hr    5. Strict I&O, daily weights, prn diuresis   6. Day -6 - day -2 - Fludarabine 30mg/m2 IV  7. Day -6 - day - 5 - CTX 14.5mg/kg/day IV. CTX to start after urine SG < 1.010. Mesna  12mg / kg - hemorrhagic cystitis ppx   8. Day -2 - IVIG 0.4 g/kg (ideal body weight) every 3 weeks. Premedication with Tylenol and benadryl  9. Day -1 -  cGy x 1 dose   10. Day - 1 - at 2200 begin transplant hydration : 0.45Ns + 1 amp (50mEq) sodium bicarbonate at 150ml /hr; continue transplant hydration for 24 hours post infusion   11. Day 0 – HPC transplant   12. Day + 2 at 2200 - begin CTX hydration (0.45NS + 10 mEq KCl/ @150ml /m2  continue 24 hours post last dose of CTX   13. Day + 3 - + 4 - CTX 50mg/kg/day IV. Begin CTX when urine SG < 1.010. EKG daily. Mesna for hemorraghic cystitis ppx.   14. Day + 5 - start Zarxio,  MMF and Tacrolimus. Check Tacrolimus levels on Monday and Thursday.   15. Anxiolytics, antinausea, antidiarrhea medications as needed   16. Lasix PRN while being aggressively hydrated to avoid VOD   17. Nutritional support, pain management  Need for prophylactic measures:  1. VOD prophylaxis - low dose heparin gtt (dosed at 100 units / kg / day), glutamine supplementation, Actigall BID   2. PCP prophylaxis - Bactrim DS through day -2    3. Antiviral prophylaxis - Continue Acyclovir   4. Antifungal prophylaxis- Diflucan 400 mg po daily.  5.. GI prophylaxis - Protonix po QD   6. Antibacterial prophylaxis -  ANC < 500, started Cipro 500mg po BID. If becomes febrile, pan cx, CXR and change Cipro to Cefepime 2g IV q 8 hours. Continue until count recovery..slight rise in wbc noted after infusion of marrow  7. Aggressive mouth care and skin care as per protocol  8. GVHD prophylaxis- high dose CTX; MMF and Tacrolimus.  9. transfusion and electrolyte support    Patient with hemoglobin drop of ~2  on 6/4, likely due to chemotherapy however will repeat CBC tonight to establish stability, likely will require transfusion.  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

## 2023-06-09 NOTE — PROGRESS NOTE ADULT - CRITICAL CARE ATTENDING COMMENT
1. Admit to BMTU Today is day + 1  2. TLC placement in IR   3. Day -6 - day + 5 - antiemetics   4. Day - 6 start CTX hydration - 1/2NS @ 200ml /hr    5. Strict I&O, daily weights, prn diuresis   6. Day -6 - day -2 - Fludarabine 30mg/m2 IV  7. Day -6 - day - 5 - CTX 14.5mg/kg/day IV. CTX to start after urine SG < 1.010. Mesna  12mg / kg - hemorrhagic cystitis ppx   8. Day -2 - IVIG 0.4 g/kg (ideal body weight) every 3 weeks. Premedication with Tylenol and benadryl  9. Day -1 -  cGy x 1 dose   10. Day - 1 - at 2200 begin transplant hydration : 0.45Ns + 1 amp (50mEq) sodium bicarbonate at 150ml /hr; continue transplant hydration for 24 hours post infusion   11. Day 0 – HPC transplant   12. Day + 2 at 2200 - begin CTX hydration (0.45NS + 10 mEq KCl/ @150ml /m2  continue 24 hours post last dose of CTX   13. Day + 3 - + 4 - CTX 50mg/kg/day IV. Begin CTX when urine SG < 1.010. EKG daily. Mesna for hemorraghic cystitis ppx.   14. Day + 5 - start Zarxio,  MMF and Tacrolimus. Check Tacrolimus levels on Monday and Thursday.   15. Anxiolytics, antinausea, antidiarrhea medications as needed   16. Lasix PRN while being aggressively hydrated to avoid VOD   17. Nutritional support, pain management  Need for prophylactic measures:  1. VOD prophylaxis - low dose heparin gtt (dosed at 100 units / kg / day), glutamine supplementation, Actigall BID   2. PCP prophylaxis - Bactrim DS through day -2    3. Antiviral prophylaxis - Continue Acyclovir   4. Antifungal prophylaxis- Diflucan 400 mg po daily.  5.. GI prophylaxis - Protonix po QD   6. Antibacterial prophylaxis - when ANC < 500, start Cipro 500mg po BID. If becomes febrile, pan cx, CXR and change Cipro to Cefepime 2g IV q 8 hours. Continue until count recovery  7. Aggressive mouth care and skin care as per protocol  8. GVHD prophylaxis- high dose CTX; MMF and Tacrolimus.  9. transfusion and electrolyte support    Patient with hemoglobin drop of ~2  on 6/4, likely due to chemotherapy however will repeat CBC tonight to establish stability, likely will require transfusion.  6/5 describes nausea  6/7 sister collected w/o complication 1. Admit to BMTU Today is day + 1  2. TLC placement in IR   3. Day -6 - day + 5 - antiemetics   4. Day - 6 start CTX hydration - 1/2NS @ 200ml /hr    5. Strict I&O, daily weights, prn diuresis   6. Day -6 - day -2 - Fludarabine 30mg/m2 IV  7. Day -6 - day - 5 - CTX 14.5mg/kg/day IV. CTX to start after urine SG < 1.010. Mesna  12mg / kg - hemorrhagic cystitis ppx   8. Day -2 - IVIG 0.4 g/kg (ideal body weight) every 3 weeks. Premedication with Tylenol and benadryl  9. Day -1 -  cGy x 1 dose   10. Day - 1 - at 2200 begin transplant hydration : 0.45Ns + 1 amp (50mEq) sodium bicarbonate at 150ml /hr; continue transplant hydration for 24 hours post infusion   11. Day 0 – HPC transplant   12. Day + 2 at 2200 - begin CTX hydration (0.45NS + 10 mEq KCl/ @150ml /m2  continue 24 hours post last dose of CTX   13. Day + 3 - + 4 - CTX 50mg/kg/day IV. Begin CTX when urine SG < 1.010. EKG daily. Mesna for hemorraghic cystitis ppx.   14. Day + 5 - start Zarxio,  MMF and Tacrolimus. Check Tacrolimus levels on Monday and Thursday.   15. Anxiolytics, antinausea, antidiarrhea medications as needed   16. Lasix PRN while being aggressively hydrated to avoid VOD   17. Nutritional support, pain management  Need for prophylactic measures:  1. VOD prophylaxis - low dose heparin gtt (dosed at 100 units / kg / day), glutamine supplementation, Actigall BID   2. PCP prophylaxis - Bactrim DS through day -2    3. Antiviral prophylaxis - Continue Acyclovir   4. Antifungal prophylaxis- Diflucan 400 mg po daily.  5.. GI prophylaxis - Protonix po QD   6. Antibacterial prophylaxis -  ANC < 500, started Cipro 500mg po BID. If becomes febrile, pan cx, CXR and change Cipro to Cefepime 2g IV q 8 hours. Continue until count recovery..slight rise in wbc noted after infusion of marrow  7. Aggressive mouth care and skin care as per protocol  8. GVHD prophylaxis- high dose CTX; MMF and Tacrolimus.  9. transfusion and electrolyte support    Patient with hemoglobin drop of ~2  on 6/4, likely due to chemotherapy however will repeat CBC tonight to establish stability, likely will require transfusion.  6/5 describes nausea  6/7 sister collected w/o complication  6/9 toci for haplo storm if t>102.5 1. Admit to BMTU Today is day + 1  2. TLC placement in IR   3. Day -6 - day + 5 - antiemetics   4. Day - 6 start CTX hydration - 1/2NS @ 200ml /hr    5. Strict I&O, daily weights, prn diuresis   6. Day -6 - day -2 - Fludarabine 30mg/m2 IV  7. Day -6 - day - 5 - CTX 14.5mg/kg/day IV. CTX to start after urine SG < 1.010. Mesna  12mg / kg - hemorrhagic cystitis ppx   8. Day -2 - IVIG 0.4 g/kg (ideal body weight) every 3 weeks. Premedication with Tylenol and benadryl  9. Day -1 -  cGy x 1 dose   10. Day - 1 - at 2200 begin transplant hydration : 0.45Ns + 1 amp (50mEq) sodium bicarbonate at 150ml /hr; continue transplant hydration for 24 hours post infusion   11. Day 0 – HPC transplant   12. Day + 2 at 2200 - begin CTX hydration (0.45NS + 10 mEq KCl/ @150ml /m2  continue 24 hours post last dose of CTX   13. Day + 3 - + 4 - CTX 50mg/kg/day IV. Begin CTX when urine SG < 1.010. EKG daily. Mesna for hemorraghic cystitis ppx.   14. Day + 5 - start Zarxio,  MMF and Tacrolimus. Check Tacrolimus levels on Monday and Thursday.   15. Anxiolytics, antinausea, antidiarrhea medications as needed   16. Lasix PRN while being aggressively hydrated to avoid VOD   17. Nutritional support, pain management  Need for prophylactic measures:  1. VOD prophylaxis - low dose heparin gtt (dosed at 100 units / kg / day), glutamine supplementation, Actigall BID   2. PCP prophylaxis - Bactrim DS through day -2    3. Antiviral prophylaxis - Continue Acyclovir   4. Antifungal prophylaxis- Diflucan 400 mg po daily.  5.. GI prophylaxis - Protonix po QD   6. Antibacterial prophylaxis -  ANC < 500, started Cipro 500mg po BID. If becomes febrile, pan cx, CXR and change Cipro to Cefepime 2g IV q 8 hours. Continue until count recovery..slight rise in wbc noted after infusion of marrow  7. Aggressive mouth care and skin care as per protocol  8. GVHD prophylaxis- high dose CTX; MMF and Tacrolimus.  9. transfusion and electrolyte support    Patient with hemoglobin drop of ~2  on 6/4, likely due to chemotherapy however will repeat CBC tonight to establish stability, likely will require transfusion.  6/5 describes nausea  6/7 sister collected w/o complication  6/9 toci for haplo storm if t>102.5..will send blood cx on patient...marrow product cx positive for gram pos rods..likely contaminent

## 2023-06-10 LAB
ALBUMIN SERPL ELPH-MCNC: 4 G/DL — SIGNIFICANT CHANGE UP (ref 3.3–5)
ALP SERPL-CCNC: 93 U/L — SIGNIFICANT CHANGE UP (ref 40–120)
ALT FLD-CCNC: 44 U/L — SIGNIFICANT CHANGE UP (ref 10–45)
ANION GAP SERPL CALC-SCNC: 11 MMOL/L — SIGNIFICANT CHANGE UP (ref 5–17)
ANISOCYTOSIS BLD QL: SLIGHT — SIGNIFICANT CHANGE UP
APPEARANCE UR: CLEAR — SIGNIFICANT CHANGE UP
AST SERPL-CCNC: 21 U/L — SIGNIFICANT CHANGE UP (ref 10–40)
BASOPHILS # BLD AUTO: 0 K/UL — SIGNIFICANT CHANGE UP (ref 0–0.2)
BASOPHILS NFR BLD AUTO: 0 % — SIGNIFICANT CHANGE UP (ref 0–2)
BILIRUB SERPL-MCNC: 1 MG/DL — SIGNIFICANT CHANGE UP (ref 0.2–1.2)
BILIRUB UR-MCNC: NEGATIVE — SIGNIFICANT CHANGE UP
BUN SERPL-MCNC: 14 MG/DL — SIGNIFICANT CHANGE UP (ref 7–23)
CALCIUM SERPL-MCNC: 8.9 MG/DL — SIGNIFICANT CHANGE UP (ref 8.4–10.5)
CHLORIDE SERPL-SCNC: 104 MMOL/L — SIGNIFICANT CHANGE UP (ref 96–108)
CO2 SERPL-SCNC: 23 MMOL/L — SIGNIFICANT CHANGE UP (ref 22–31)
COLOR SPEC: SIGNIFICANT CHANGE UP
CREAT SERPL-MCNC: 0.85 MG/DL — SIGNIFICANT CHANGE UP (ref 0.5–1.3)
DACRYOCYTES BLD QL SMEAR: SLIGHT — SIGNIFICANT CHANGE UP
DIFF PNL FLD: NEGATIVE — SIGNIFICANT CHANGE UP
EGFR: 116 ML/MIN/1.73M2 — SIGNIFICANT CHANGE UP
EOSINOPHIL # BLD AUTO: 0.1 K/UL — SIGNIFICANT CHANGE UP (ref 0–0.5)
EOSINOPHIL NFR BLD AUTO: 8 % — HIGH (ref 0–6)
GLUCOSE SERPL-MCNC: 91 MG/DL — SIGNIFICANT CHANGE UP (ref 70–99)
GLUCOSE UR QL: NEGATIVE — SIGNIFICANT CHANGE UP
HCT VFR BLD CALC: 24.6 % — LOW (ref 39–50)
HGB BLD-MCNC: 8.3 G/DL — LOW (ref 13–17)
KETONES UR-MCNC: NEGATIVE — SIGNIFICANT CHANGE UP
LDH SERPL L TO P-CCNC: 288 U/L — HIGH (ref 50–242)
LEUKOCYTE ESTERASE UR-ACNC: NEGATIVE — SIGNIFICANT CHANGE UP
LYMPHOCYTES # BLD AUTO: 0.02 K/UL — LOW (ref 1–3.3)
LYMPHOCYTES # BLD AUTO: 1.8 % — LOW (ref 13–44)
MACROCYTES BLD QL: SLIGHT — SIGNIFICANT CHANGE UP
MAGNESIUM SERPL-MCNC: 2.2 MG/DL — SIGNIFICANT CHANGE UP (ref 1.6–2.6)
MANUAL SMEAR VERIFICATION: SIGNIFICANT CHANGE UP
MCHC RBC-ENTMCNC: 31.9 PG — SIGNIFICANT CHANGE UP (ref 27–34)
MCHC RBC-ENTMCNC: 33.7 GM/DL — SIGNIFICANT CHANGE UP (ref 32–36)
MCV RBC AUTO: 94.6 FL — SIGNIFICANT CHANGE UP (ref 80–100)
METAMYELOCYTES # FLD: 0.9 % — HIGH (ref 0–0)
MONOCYTES # BLD AUTO: 0 K/UL — SIGNIFICANT CHANGE UP (ref 0–0.9)
MONOCYTES NFR BLD AUTO: 0 % — LOW (ref 2–14)
NEUTROPHILS # BLD AUTO: 1.12 K/UL — LOW (ref 1.8–7.4)
NEUTROPHILS NFR BLD AUTO: 87.5 % — HIGH (ref 43–77)
NEUTS BAND # BLD: 1.8 % — SIGNIFICANT CHANGE UP (ref 0–8)
NITRITE UR-MCNC: NEGATIVE — SIGNIFICANT CHANGE UP
PH UR: 6.5 — SIGNIFICANT CHANGE UP (ref 5–8)
PHOSPHATE SERPL-MCNC: 3.5 MG/DL — SIGNIFICANT CHANGE UP (ref 2.5–4.5)
PLAT MORPH BLD: NORMAL — SIGNIFICANT CHANGE UP
PLATELET # BLD AUTO: 108 K/UL — LOW (ref 150–400)
POIKILOCYTOSIS BLD QL AUTO: SLIGHT — SIGNIFICANT CHANGE UP
POTASSIUM SERPL-MCNC: 4.2 MMOL/L — SIGNIFICANT CHANGE UP (ref 3.5–5.3)
POTASSIUM SERPL-SCNC: 4.2 MMOL/L — SIGNIFICANT CHANGE UP (ref 3.5–5.3)
PROT SERPL-MCNC: 7.1 G/DL — SIGNIFICANT CHANGE UP (ref 6–8.3)
PROT UR-MCNC: SIGNIFICANT CHANGE UP
RBC # BLD: 2.6 M/UL — LOW (ref 4.2–5.8)
RBC # FLD: 17 % — HIGH (ref 10.3–14.5)
RBC BLD AUTO: ABNORMAL
SODIUM SERPL-SCNC: 138 MMOL/L — SIGNIFICANT CHANGE UP (ref 135–145)
SP GR SPEC: 1.01 — SIGNIFICANT CHANGE UP (ref 1.01–1.02)
UROBILINOGEN FLD QL: NEGATIVE — SIGNIFICANT CHANGE UP
WBC # BLD: 1.25 K/UL — LOW (ref 3.8–10.5)
WBC # FLD AUTO: 1.25 K/UL — LOW (ref 3.8–10.5)

## 2023-06-10 PROCEDURE — 99233 SBSQ HOSP IP/OBS HIGH 50: CPT

## 2023-06-10 PROCEDURE — 71045 X-RAY EXAM CHEST 1 VIEW: CPT | Mod: 26

## 2023-06-10 RX ORDER — CEFEPIME 1 G/1
INJECTION, POWDER, FOR SOLUTION INTRAMUSCULAR; INTRAVENOUS
Refills: 0 | Status: DISCONTINUED | OUTPATIENT
Start: 2023-06-10 | End: 2023-06-20

## 2023-06-10 RX ORDER — CEFEPIME 1 G/1
2000 INJECTION, POWDER, FOR SOLUTION INTRAMUSCULAR; INTRAVENOUS EVERY 8 HOURS
Refills: 0 | Status: DISCONTINUED | OUTPATIENT
Start: 2023-06-11 | End: 2023-06-20

## 2023-06-10 RX ORDER — CEFEPIME 1 G/1
2000 INJECTION, POWDER, FOR SOLUTION INTRAMUSCULAR; INTRAVENOUS ONCE
Refills: 0 | Status: COMPLETED | OUTPATIENT
Start: 2023-06-10 | End: 2023-06-10

## 2023-06-10 RX ADMIN — Medication 5 MILLILITER(S): at 17:18

## 2023-06-10 RX ADMIN — APREPITANT 80 MILLIGRAM(S): 80 CAPSULE ORAL at 11:31

## 2023-06-10 RX ADMIN — Medication 1 LOZENGE: at 08:04

## 2023-06-10 RX ADMIN — Medication 5 MILLILITER(S): at 00:26

## 2023-06-10 RX ADMIN — ONDANSETRON 8 MILLIGRAM(S): 8 TABLET, FILM COATED ORAL at 13:54

## 2023-06-10 RX ADMIN — Medication 400 MILLIGRAM(S): at 13:54

## 2023-06-10 RX ADMIN — URSODIOL 300 MILLIGRAM(S): 250 TABLET, FILM COATED ORAL at 06:28

## 2023-06-10 RX ADMIN — ONDANSETRON 8 MILLIGRAM(S): 8 TABLET, FILM COATED ORAL at 22:22

## 2023-06-10 RX ADMIN — Medication 5 MILLILITER(S): at 11:32

## 2023-06-10 RX ADMIN — Medication 5 MILLILITER(S): at 08:04

## 2023-06-10 RX ADMIN — CEFEPIME 100 MILLIGRAM(S): 1 INJECTION, POWDER, FOR SOLUTION INTRAMUSCULAR; INTRAVENOUS at 21:49

## 2023-06-10 RX ADMIN — Medication 1 LOZENGE: at 19:39

## 2023-06-10 RX ADMIN — Medication 5 MILLILITER(S): at 19:41

## 2023-06-10 RX ADMIN — Medication 10 MILLILITER(S): at 00:26

## 2023-06-10 RX ADMIN — Medication 650 MILLIGRAM(S): at 21:46

## 2023-06-10 RX ADMIN — Medication 1 MILLIGRAM(S): at 11:31

## 2023-06-10 RX ADMIN — Medication 650 MILLIGRAM(S): at 22:15

## 2023-06-10 RX ADMIN — Medication 1 LOZENGE: at 17:18

## 2023-06-10 RX ADMIN — URSODIOL 300 MILLIGRAM(S): 250 TABLET, FILM COATED ORAL at 17:20

## 2023-06-10 RX ADMIN — CHLORHEXIDINE GLUCONATE 1 APPLICATION(S): 213 SOLUTION TOPICAL at 06:29

## 2023-06-10 RX ADMIN — Medication 500 MILLIGRAM(S): at 06:28

## 2023-06-10 RX ADMIN — Medication 1 LOZENGE: at 11:30

## 2023-06-10 RX ADMIN — Medication 10 MILLILITER(S): at 08:04

## 2023-06-10 RX ADMIN — Medication 400 MILLIGRAM(S): at 06:28

## 2023-06-10 RX ADMIN — FAMOTIDINE 20 MILLIGRAM(S): 10 INJECTION INTRAVENOUS at 06:29

## 2023-06-10 RX ADMIN — FLUCONAZOLE 400 MILLIGRAM(S): 150 TABLET ORAL at 11:30

## 2023-06-10 RX ADMIN — Medication 500 MILLIGRAM(S): at 17:19

## 2023-06-10 RX ADMIN — Medication 10 MILLILITER(S): at 11:32

## 2023-06-10 RX ADMIN — ONDANSETRON 8 MILLIGRAM(S): 8 TABLET, FILM COATED ORAL at 06:28

## 2023-06-10 RX ADMIN — Medication 1 TABLET(S): at 11:31

## 2023-06-10 RX ADMIN — Medication 10 MILLILITER(S): at 17:19

## 2023-06-10 RX ADMIN — FAMOTIDINE 20 MILLIGRAM(S): 10 INJECTION INTRAVENOUS at 17:19

## 2023-06-10 RX ADMIN — Medication 400 MILLIGRAM(S): at 21:46

## 2023-06-10 RX ADMIN — Medication 10 MILLILITER(S): at 19:39

## 2023-06-10 RX ADMIN — Medication 1 LOZENGE: at 00:26

## 2023-06-10 NOTE — PROGRESS NOTE ADULT - SUBJECTIVE AND OBJECTIVE BOX
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 () with Flu / Cy / TBI prep regimen for treatment of severe aplastic anemia  S: Patient seen and examined with HPC Transplant Team:        O:   Vital Signs Last 24 Hrs  T(C): 37.1 (10 Garfield 2023 13:00), Max: 37.8 (10 Garfield 2023 01:37)  T(F): 98.8 (10 Garfield 2023 13:00), Max: 100 (10 Garfield 2023 01:37)  HR: 101 (10 Garfield 2023 13:) (94 - 101)  BP: 137/76 (10 Garfield 2023 13:) (102/67 - 137/76)  BP(mean): --  RR: 18 (10 Garfield 2023 13:) (18 - 18)  SpO2: 99% (10 Garfield 2023 13:) (98% - 99%)    Parameters below as of 10 Garfield 2023 13:00  Patient On (Oxygen Delivery Method): room air        Admit weight:   Daily     Daily Weight in k.9 (10 Garfield 2023 09:30)    Intake / Output:    @ 07:  -  06-10 @ 07:00  --------------------------------------------------------  IN: 3216.3 mL / OUT: 3900 mL / NET: -683.7 mL    06-10 @ 07:01  -  06-10 @ 17:33  --------------------------------------------------------  IN: 843.5 mL / OUT: 500 mL / NET: 343.5 mL    PE:   Oropharynx: Clear  Oral Mucositis: (-)                                                        Grade:   CVS: RRR, +S1,S2  Lungs: CTA B/L  Abdomen: Soft, NT, ND, +BS  Extremities: No edema in BLE  Gastric Mucositis: (-)                                                 Grade:   Intestinal Mucositis:   (-)                                           Grade:   Skin: Intact  TLC: CDI  Neuro: Alert, Ox3  Pain: Denies    Labs:   CBC Full  -  ( 10 Garfield 2023 07:14 )  WBC Count : 1.25 K/uL  Hemoglobin : 8.3 g/dL  Hematocrit : 24.6 %  Platelet Count - Automated : 108 K/uL  Mean Cell Volume : 94.6 fl  Mean Cell Hemoglobin : 31.9 pg  Mean Cell Hemoglobin Concentration : 33.7 gm/dL  Auto Neutrophil # : 1.12 K/uL  Auto Lymphocyte # : 0.02 K/uL  Auto Monocyte # : 0.00 K/uL  Auto Eosinophil # : 0.10 K/uL  Auto Basophil # : 0.00 K/uL  Auto Neutrophil % : 87.5 %  Auto Lymphocyte % : 1.8 %  Auto Monocyte % : 0.0 %  Auto Eosinophil % : 8.0 %  Auto Basophil % : 0.0 %                          8.3    1.25  )-----------( 108      ( 10 Garfield 2023 07:14 )             24.6     06-10    138  |  104  |  14  ----------------------------<  91  4.2   |  23  |  0.85    Ca    8.9      10 Garfield 2023 07:14  Phos  3.5     06-10  Mg     2.2     06-10    TPro  7.1  /  Alb  4.0  /  TBili  1.0  /  DBili  x   /  AST  21  /  ALT  44  /  AlkPhos  93  06-10      LIVER FUNCTIONS - ( 10 Garfield 2023 07:14 )  Alb: 4.0 g/dL / Pro: 7.1 g/dL / ALK PHOS: 93 U/L / ALT: 44 U/L / AST: 21 U/L / GGT: x           Lactate Dehydrogenase, Serum: 288 U/L (06-10 @ 07:14)          Cultures:         Radiology:       Meds:   Antimicrobials:   acyclovir   Oral Tab/Cap 400 milliGRAM(s) Oral every 8 hours  ciprofloxacin     Tablet 500 milliGRAM(s) Oral every 12 hours  clotrimazole Lozenge 1 Lozenge Oral five times a day  fluconAZOLE   Tablet 400 milliGRAM(s) Oral daily      Heme / Onc:   heparin  Infusion 424 Unit(s)/Hr IV Continuous <Continuous>      GI:  famotidine    Tablet 20 milliGRAM(s) Oral two times a day  senna 1 Tablet(s) Oral at bedtime PRN  sodium bicarbonate Mouth Rinse 10 milliLiter(s) Swish and Spit five times a day  ursodiol Capsule 300 milliGRAM(s) Oral two times a day      Cardiovascular:       Immunologic:       Other medications:   acetaminophen     Tablet .. 650 milliGRAM(s) Oral every 6 hours  aprepitant (Chemo) 80 milliGRAM(s) Oral every 24 hours  Biotene Dry Mouth Oral Rinse 5 milliLiter(s) Swish and Spit five times a day  Chemotherapy Worklist Order      chlorhexidine 4% Liquid 1 Application(s) Topical <User Schedule>  folic acid 1 milliGRAM(s) Oral daily  Infuse HPC Product      multivitamin 1 Tablet(s) Oral daily  ondansetron Injectable (Chemo) 8 milliGRAM(s) IV Push every 8 hours  sodium bicarbonate  Infusion (Chemo) 0.074 mEq/kG/Hr IV Continuous <Continuous>  sodium chloride 0.9% 1000 milliLiter(s) IV Continuous <Continuous>  sodium chloride 0.9%. 1000 milliLiter(s) IV Continuous <Continuous>  sodium chloride 0.9%. (Chemo) 1000 milliLiter(s) IV Continuous <Continuous>      PRN:   acetaminophen     Tablet .. 650 milliGRAM(s) Oral every 6 hours PRN  diphenhydrAMINE Injectable 25 milliGRAM(s) IV Push every 12 hours PRN  prochlorperazine   Injectable 10 milliGRAM(s) IV Push every 6 hours PRN  senna 1 Tablet(s) Oral at bedtime PRN  sodium chloride 0.9% lock flush 10 milliLiter(s) IV Push every 1 hour PRN          A/P:  37 year old male with severe aplastic anemia with multiple lines of treatment admitted for a haplo-identical bone marrow transplant with  Flu / Cy / TBI preparative regimen   Day + 2  start CTX hydration - 1/2NS @ 200ml /hr    Strict I&O, daily weights, prn diuresis   Day -6 - day -2 - Fludarabine 30mg/m2 IV  Day -6 - day - 5 - CTX 14.5mg/kg/day IV. CTX to start after urine SG < 1.010. Mesna  12mg / kg - hemorrhagic cystitis ppx   Day -2 - IVIG 0.4 g/kg (ideal body weight) every 3 weeks. Premedication with Tylenol and benadryl   Day -1 -  cGy x 1 dose   Day - 1 - at  begin transplant hydration : 0.45Ns + 1 amp (50mEq) sodium bicarbonate at 150ml /hr; continue transplant hydration for 24 hours post infusion    Day + 2 at 0 - begin CTX hydration (0.45NS + 10 mEq KCl/ @150ml /m2  continue 24 hours post last dose of CTX   Day + 3 - + 4 - CTX 50mg/kg/day IV. Begin CTX when urine SG < 1.010. EKG daily. Mesna for hemorraghic cystitis ppx.   Day + 5 - start Zarxio,  MMF and Tacrolimus. Check Tacrolimus levels on Monday and Thursday.   when ANC < 500, start Cipro 500mg po BID. If becomes febrile, pan cx, CXR and change Cipro to Cefepime 2g IV q 8 hours. Continue until count recovery  - Fludarabine , intermittent nausea, continue antiemetics   - Fludarabine , IVIG today    TBI today  - HPC transplant today. Continue transplant hydration for 24 hours post infusion of bone marrow   - monitor for CRS / haplo storm - if T >/= 102.5 on days +1 or +2, would dose tocilizumab 8mg / kg IVPB x 1     1. Infectious Disease:   acyclovir   Oral Tab/Cap 400 milliGRAM(s) Oral every 8 hours  ciprofloxacin     Tablet 500 milliGRAM(s) Oral every 12 hours  clotrimazole Lozenge 1 Lozenge Oral five times a day  fluconAZOLE   Tablet 400 milliGRAM(s) Oral daily    2. VOD Prophylaxis: Actigall, Glutamine, Heparin (dosed at 100 units / kg / day)     3. GI Prophylaxis:   famotidine    Tablet 20 milliGRAM(s) Oral two times a day    4. Mouthcare - NS / NaHCO3 rinses, Mycelex, Biotene; Skin care     5. GVHD prophylaxis   TBI day -1   CTX days +3, +4  MMF, tacrolimus to start on day +5     6. Transfuse & replete electrolytes prn     7. IV hydration, daily weights, strict I&O, prn diuresis     8. PO intake as tolerated, nutrition follow up as needed, MVI, folic acid     9. Antiemetics, anti-diarrhea medications:   LORazepam   Injectable 1 milliGRAM(s) IV Push every 6 hours PRN  prochlorperazine   Injectable 10 milliGRAM(s) IV Push every 6 hours PRN  ondansetron Injectable (Chemo) 8 milliGRAM(s) IV Push every 8 hours  aprepitant (Chemo) 80 milliGRAM(s) Oral every 24 hours    10. OOB as tolerated, physical therapy consult if needed     11. Monitor coags / fibrinogen 2x week, vitamin K as needed     12. Monitor closely for clinical changes, monitor for fevers     13. Emotional support provided, plan of care discussed and questions addressed     14. Patient education done regarding chemotherapy prep, plan of care, restrictions and discharge planning     15. Continue regular social work input     I have written the above note for Dr. Wilder who performed service with me in the room.   C ([phone])    I have seen and examined patient with NP, I agree with above note as scribed.                    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 () with Flu / Cy / TBI prep regimen for treatment of severe aplastic anemia    S: Patient seen and examined with HPC Transplant Team: No overnight events     O: Rest of ROS negative    Vital Signs Last 24 Hrs  T(C): 37.1 (10 [facility]field 2023 13:00), Max: 37.8 (10 [facility] 2023 01:37)  T(F): 98.8 (10 [facility]field 2023 13:00), Max: 100 (10 [facility] 2023 01:37)  HR: 101 (10 [facility]field 2023 13:00) (94 - 101)  BP: 137/76 (10 [facility]field 2023 13:00) (102/67 - 137/76)  BP(mean): --  RR: 18 (10 [facility] 2023 13:00) (18 - 18)  SpO2: 99% (10 [facility]field 2023 13:00) (98% - 99%)    Parameters below as of 10 Garfield 2023 13:00  Patient On (Oxygen Delivery Method): room air    Admit weight: 102.8kg  Daily Weight in k.9 (10 [facility]field 2023 09:30)    Intake / Output:    @ :01  -  06-10 @ 07:00  --------------------------------------------------------  IN: 3216.3 mL / OUT: 3900 mL / NET: -683.7 mL    06-10 @ 07:01  -  06-10 @ 17:33  --------------------------------------------------------  IN: 843.5 mL / OUT: 500 mL / NET: 343.5 mL    PE:   Oropharynx: Clear  Oral Mucositis: (-)                                                        Grade:   CVS: RRR, +S1,S2  Lungs: CTA B/L  Abdomen: Soft, NT, ND, +BS  Extremities: No edema in BLE  Gastric Mucositis: (-)                                                 Grade:   Intestinal Mucositis:   (-)                                           Grade:   Skin: Intact  TLC: CDI  Neuro: Alert, Ox3  Pain: Denies    Labs:   CBC Full  -  ( 10 [facility]field 2023 07:14 )  WBC Count : 1.25 K/uL  Hemoglobin : 8.3 g/dL  Hematocrit : 24.6 %  Platelet Count - Automated : 108 K/uL  Mean Cell Volume : 94.6 fl  Mean Cell Hemoglobin : 31.9 pg  Mean Cell Hemoglobin Concentration : 33.7 gm/dL  Auto Neutrophil # : 1.12 K/uL  Auto Lymphocyte # : 0.02 K/uL  Auto Monocyte # : 0.00 K/uL  Auto Eosinophil # : 0.10 K/uL  Auto Basophil # : 0.00 K/uL  Auto Neutrophil % : 87.5 %  Auto Lymphocyte % : 1.8 %  Auto Monocyte % : 0.0 %  Auto Eosinophil % : 8.0 %  Auto Basophil % : 0.0 %                          8.3    1.25  )-----------( 108      ( 10 [facility]field 2023 07:14 )             24.6     06-10    138  |  104  |  14  ----------------------------<  91  4.2   |  23  |  0.85    Ca    8.9      10 [facility]field 2023 07:14  Phos  3.5     06-10  Mg     2.2     06-10    TPro  7.1  /  Alb  4.0  /  TBili  1.0  /  DBili  x   /  AST  21  /  ALT  44  /  AlkPhos  93  06-10      LIVER FUNCTIONS - ( 10 [facility]field 2023 07:14 )  Alb: 4.0 g/dL / Pro: 7.1 g/dL / ALK PHOS: 93 U/L / ALT: 44 U/L / AST: 21 U/L / GGT: x           Lactate Dehydrogenase, Serum: 288 U/L (06-10 @ 07:14)      Cultures:       Radiology:       Meds:   Antimicrobials:   acyclovir   Oral Tab/Cap 400 milliGRAM(s) Oral every 8 hours  ciprofloxacin     Tablet 500 milliGRAM(s) Oral every 12 hours  clotrimazole Lozenge 1 Lozenge Oral five times a day  fluconAZOLE   Tablet 400 milliGRAM(s) Oral daily      Heme / Onc:   heparin  Infusion 424 Unit(s)/Hr IV Continuous <Continuous>      GI:  famotidine    Tablet 20 milliGRAM(s) Oral two times a day  senna 1 Tablet(s) Oral at bedtime PRN  sodium bicarbonate Mouth Rinse 10 milliLiter(s) Swish and Spit five times a day  ursodiol Capsule 300 milliGRAM(s) Oral two times a day      Cardiovascular:       Immunologic:       Other medications:   acetaminophen     Tablet .. 650 milliGRAM(s) Oral every 6 hours  aprepitant (Chemo) 80 milliGRAM(s) Oral every 24 hours  Biotene Dry Mouth Oral Rinse 5 milliLiter(s) Swish and Spit five times a day  Chemotherapy Worklist Order      chlorhexidine 4% Liquid 1 Application(s) Topical <User Schedule>  folic acid 1 milliGRAM(s) Oral daily  Infuse HPC Product      multivitamin 1 Tablet(s) Oral daily  ondansetron Injectable (Chemo) 8 milliGRAM(s) IV Push every 8 hours  sodium bicarbonate  Infusion (Chemo) 0.074 mEq/kG/Hr IV Continuous <Continuous>  sodium chloride 0.9% 1000 milliLiter(s) IV Continuous <Continuous>  sodium chloride 0.9%. 1000 milliLiter(s) IV Continuous <Continuous>  sodium chloride 0.9%. (Chemo) 1000 milliLiter(s) IV Continuous <Continuous>      PRN:   acetaminophen     Tablet .. 650 milliGRAM(s) Oral every 6 hours PRN  diphenhydrAMINE Injectable 25 milliGRAM(s) IV Push every 12 hours PRN  prochlorperazine   Injectable 10 milliGRAM(s) IV Push every 6 hours PRN  senna 1 Tablet(s) Oral at bedtime PRN  sodium chloride 0.9% lock flush 10 milliLiter(s) IV Push every 1 hour PRN          A/P:  37 year old male with severe aplastic anemia with multiple lines of treatment admitted for a haplo-identical bone marrow transplant with  Flu / Cy / TBI preparative regimen   Day + 2  start CTX hydration - 1/2NS @ 200ml /hr    Strict I&O, daily weights, prn diuresis   Day -6 - day -2 - Fludarabine 30mg/m2 IV  Day -6 - day - 5 - CTX 14.5mg/kg/day IV. CTX to start after urine SG < 1.010. Mesna  12mg / kg - hemorrhagic cystitis ppx   Day -2 - IVIG 0.4 g/kg (ideal body weight) every 3 weeks. Premedication with Tylenol and benadryl   Day -1 -  cGy x 1 dose   Day - 1 - at 2200 begin transplant hydration : 0.45Ns + 1 amp (50mEq) sodium bicarbonate at 150ml /hr; continue transplant hydration for 24 hours post infusion    Day + 2 at 2200 - begin CTX hydration (0.45NS + 10 mEq KCl/ @150ml /m2  continue 24 hours post last dose of CTX   Day + 3 - + 4 - CTX 50mg/kg/day IV. Begin CTX when urine SG < 1.010. EKG daily. Mesna for hemorraghic cystitis ppx.   Day + 5 - start Zarxio,  MMF and Tacrolimus. Check Tacrolimus levels on Monday and Thursday.   when ANC < 500, start Cipro 500mg po BID. If becomes febrile, pan cx, CXR and change Cipro to Cefepime 2g IV q 8 hours. Continue until count recovery  - Fludarabine , intermittent nausea, continue antiemetics   - Fludarabine , IVIG today    TBI today  - HPC transplant today. Continue transplant hydration for 24 hours post infusion of bone marrow   - monitor for CRS / haplo storm - if T >/= 102.5 on days +1 or +2, would dose tocilizumab 8mg / kg IVPB x 1     1. Infectious Disease:   acyclovir   Oral Tab/Cap 400 milliGRAM(s) Oral every 8 hours  ciprofloxacin     Tablet 500 milliGRAM(s) Oral every 12 hours  clotrimazole Lozenge 1 Lozenge Oral five times a day  fluconAZOLE   Tablet 400 milliGRAM(s) Oral daily    2. VOD Prophylaxis: Actigall, Glutamine, Heparin (dosed at 100 units / kg / day)     3. GI Prophylaxis:   famotidine    Tablet 20 milliGRAM(s) Oral two times a day    4. Mouthcare - NS / NaHCO3 rinses, Mycelex, Biotene; Skin care     5. GVHD prophylaxis   TBI day -1   CTX days +3, +4  MMF, tacrolimus to start on day +5     6. Transfuse & replete electrolytes prn     7. IV hydration, daily weights, strict I&O, prn diuresis     8. PO intake as tolerated, nutrition follow up as needed, MVI, folic acid     9. Antiemetics, anti-diarrhea medications:   LORazepam   Injectable 1 milliGRAM(s) IV Push every 6 hours PRN  prochlorperazine   Injectable 10 milliGRAM(s) IV Push every 6 hours PRN  ondansetron Injectable (Chemo) 8 milliGRAM(s) IV Push every 8 hours  aprepitant (Chemo) 80 milliGRAM(s) Oral every 24 hours    10. OOB as tolerated, physical therapy consult if needed     11. Monitor coags / fibrinogen 2x week, vitamin K as needed     12. Monitor closely for clinical changes, monitor for fevers     13. Emotional support provided, plan of care discussed and questions addressed     14. Patient education done regarding chemotherapy prep, plan of care, restrictions and discharge planning     15. Continue regular social work input     I have written the above note for Dr. Wilder who performed service with me in the room.   MARGUERITE ([phone])    I have seen and examined patient with PA, I agree with above note as scribed.

## 2023-06-10 NOTE — CHART NOTE - NSCHARTNOTEFT_GEN_A_CORE
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  Patient seen and examined patient at bedside.  Patient is alert, denies HA, visual changes, CP, SOB, cough, N/V, dysuria, or abd pain.    >VITAL SIGNS:  T(C): 38.1 (06-10-23 @ 21:24), Max: 38.1 (06-10-23 @ 21:24)  T(F): 100.6 (06-10-23 @ 21:24), Max: 100.6 (06-10-23 @ 21:24)  HR: 113 (06-10-23 @ 21:24) (94 - 113)  BP: 126/73 (06-10-23 @ 21:24) (102/67 - 142/82)  RR: 18 (06-10-23 @ 21:24) (18 - 18)  SpO2: 98% (06-10-23 @ 21:24) (98% - 100%)      >Review Of Systems:   CONSTITUTIONAL:  + fever. No chills  EYES: No visual changes.    ENT:  No  throat pain.  No neck stiffness  RESPIRATORY: No cough, wheezing, hemoptysis; No shortness of breath  CARDIOVASCULAR: No chest pain or palpitations  GASTROINTESTINAL: No abdominal or epigastric pain.  No diarrhea.    GENITOURINARY: No dysuria, frequency or hematuria  NEUROLOGICAL: No numbness or weakness  SKIN: No itching, burning, rashes, or lesions       >PHYSICAL EXAM:  Constitutional: AOx3. NAD.  Neuro:  Grossly intact   Cardiovascular: +S1 S2. RRR.  No murmurs.  No LE edema.  Respiratory:  Even, unlabored.  Clear lungs bilaterally. No wheezing, rhonchi, or crackles.  Gastrointestinal: +BS X4 active. Soft. Nontender. Nondistended   Extremities/Vascular: +2 pulses bilaterally.   Skin:  +Feverish to touch     >MEDICATIONS:    MEDICATIONS  (STANDING):  acetaminophen     Tablet .. 650 milliGRAM(s) Oral every 6 hours  acyclovir   Oral Tab/Cap 400 milliGRAM(s) Oral every 8 hours  aprepitant (Chemo) 80 milliGRAM(s) Oral every 24 hours  Biotene Dry Mouth Oral Rinse 5 milliLiter(s) Swish and Spit five times a day  cefepime   IVPB      Chemotherapy Worklist Order      chlorhexidine 4% Liquid 1 Application(s) Topical <User Schedule>  clotrimazole Lozenge 1 Lozenge Oral five times a day  famotidine    Tablet 20 milliGRAM(s) Oral two times a day  fluconAZOLE   Tablet 400 milliGRAM(s) Oral daily  folic acid 1 milliGRAM(s) Oral daily  heparin  Infusion 424 Unit(s)/Hr (4.24 mL/Hr) IV Continuous <Continuous>  Infuse HPC Product      multivitamin 1 Tablet(s) Oral daily  ondansetron Injectable (Chemo) 8 milliGRAM(s) IV Push every 8 hours  sodium bicarbonate  Infusion (Chemo) 0.074 mEq/kG/Hr (150 mL/Hr) IV Continuous <Continuous>  sodium bicarbonate Mouth Rinse 10 milliLiter(s) Swish and Spit five times a day  sodium chloride 0.9% 1000 milliLiter(s) (288 mL/Hr) IV Continuous <Continuous>  sodium chloride 0.9%. 1000 milliLiter(s) (20 mL/Hr) IV Continuous <Continuous>  sodium chloride 0.9%. (Chemo) 1000 milliLiter(s) (200 mL/Hr) IV Continuous <Continuous>  ursodiol Capsule 300 milliGRAM(s) Oral two times a day      >LABORATORY:                          8.3    1.25  )-----------( 108      ( 10 [place] 2023 07:14 )             24.6       06-10    138  |  104  |  14  ----------------------------<  91  4.2   |  23  |  0.85    Ca    8.9      10 [place] 2023 07:14  Phos  3.5     06-10  Mg     2.2     06-10    TPro  7.1  /  Alb  4.0  /  TBili  1.0  /  DBili  x   /  AST  21  /  ALT  44  /  AlkPhos  93  06-10              >ASSESSMENT/PLAN:   HPI:  This is a 37 year old male with severe aplastic anemia admitted for a haplo-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 Hematologic history as follows: initially diagnosed by bone marrow biopsy 4/20, began treatment 4/23/20 with horse ATG + CSA + promacta and achieved CR1. A bone marrow biopsy 11/9/20 showed hypocellular marrow, with overall improved cellularity showing marrow regeneration. Bone marrow biopsy 5/18/21 showed normal morphology and normal cellularity. He relapsed 11/2021, developing worsening cytopenias and neutropenic fevers. He was then treated with rabbit ATG, prednisone, CSA and promacta with partial response 12/27/21. He later developed worsening hemolysis and pancytopenia, with a rising PNH clone and was treated with Ravulizumab. He has no complaints on admission.  (02 Jun 2023 12:14)      1) Neutropenic Fever  - Tylenol / Cooling measures prn for Pyrexia  - BC x2, UA/UC, CXR  - ciprofloxacin switched to cefepime   - Monitor VS  - ID on board, f/u with team  - F/U Primary  care team in      Rosemarie Munoz PA-C  Department of Medicine  [facility] [id number]

## 2023-06-11 LAB
ALBUMIN SERPL ELPH-MCNC: 3.9 G/DL — SIGNIFICANT CHANGE UP (ref 3.3–5)
ALBUMIN SERPL ELPH-MCNC: 4.1 G/DL — SIGNIFICANT CHANGE UP (ref 3.3–5)
ALP SERPL-CCNC: 84 U/L — SIGNIFICANT CHANGE UP (ref 40–120)
ALP SERPL-CCNC: 84 U/L — SIGNIFICANT CHANGE UP (ref 40–120)
ALT FLD-CCNC: 31 U/L — SIGNIFICANT CHANGE UP (ref 10–45)
ALT FLD-CCNC: 34 U/L — SIGNIFICANT CHANGE UP (ref 10–45)
ANION GAP SERPL CALC-SCNC: 10 MMOL/L — SIGNIFICANT CHANGE UP (ref 5–17)
ANION GAP SERPL CALC-SCNC: 9 MMOL/L — SIGNIFICANT CHANGE UP (ref 5–17)
AST SERPL-CCNC: 17 U/L — SIGNIFICANT CHANGE UP (ref 10–40)
AST SERPL-CCNC: 18 U/L — SIGNIFICANT CHANGE UP (ref 10–40)
BASOPHILS # BLD AUTO: 0.01 K/UL — SIGNIFICANT CHANGE UP (ref 0–0.2)
BASOPHILS NFR BLD AUTO: 1.6 % — SIGNIFICANT CHANGE UP (ref 0–2)
BILIRUB SERPL-MCNC: 0.8 MG/DL — SIGNIFICANT CHANGE UP (ref 0.2–1.2)
BILIRUB SERPL-MCNC: 1 MG/DL — SIGNIFICANT CHANGE UP (ref 0.2–1.2)
BUN SERPL-MCNC: 10 MG/DL — SIGNIFICANT CHANGE UP (ref 7–23)
BUN SERPL-MCNC: 8 MG/DL — SIGNIFICANT CHANGE UP (ref 7–23)
C DIFF GDH STL QL: NEGATIVE — SIGNIFICANT CHANGE UP
C DIFF GDH STL QL: SIGNIFICANT CHANGE UP
CALCIUM SERPL-MCNC: 8.3 MG/DL — LOW (ref 8.4–10.5)
CALCIUM SERPL-MCNC: 8.4 MG/DL — SIGNIFICANT CHANGE UP (ref 8.4–10.5)
CHLORIDE SERPL-SCNC: 104 MMOL/L — SIGNIFICANT CHANGE UP (ref 96–108)
CHLORIDE SERPL-SCNC: 106 MMOL/L — SIGNIFICANT CHANGE UP (ref 96–108)
CO2 SERPL-SCNC: 22 MMOL/L — SIGNIFICANT CHANGE UP (ref 22–31)
CO2 SERPL-SCNC: 22 MMOL/L — SIGNIFICANT CHANGE UP (ref 22–31)
CREAT SERPL-MCNC: 0.7 MG/DL — SIGNIFICANT CHANGE UP (ref 0.5–1.3)
CREAT SERPL-MCNC: 0.77 MG/DL — SIGNIFICANT CHANGE UP (ref 0.5–1.3)
EGFR: 120 ML/MIN/1.73M2 — SIGNIFICANT CHANGE UP
EGFR: 123 ML/MIN/1.73M2 — SIGNIFICANT CHANGE UP
EOSINOPHIL # BLD AUTO: 0.09 K/UL — SIGNIFICANT CHANGE UP (ref 0–0.5)
EOSINOPHIL NFR BLD AUTO: 11.3 % — HIGH (ref 0–6)
GI PCR PANEL: SIGNIFICANT CHANGE UP
GLUCOSE SERPL-MCNC: 118 MG/DL — HIGH (ref 70–99)
GLUCOSE SERPL-MCNC: 96 MG/DL — SIGNIFICANT CHANGE UP (ref 70–99)
HCT VFR BLD CALC: 20.7 % — CRITICAL LOW (ref 39–50)
HCT VFR BLD CALC: 21.6 % — LOW (ref 39–50)
HGB BLD-MCNC: 7.1 G/DL — LOW (ref 13–17)
HGB BLD-MCNC: 7.4 G/DL — LOW (ref 13–17)
LDH SERPL L TO P-CCNC: 278 U/L — HIGH (ref 50–242)
LYMPHOCYTES # BLD AUTO: 0 % — LOW (ref 13–44)
LYMPHOCYTES # BLD AUTO: 0 K/UL — LOW (ref 1–3.3)
MAGNESIUM SERPL-MCNC: 1.9 MG/DL — SIGNIFICANT CHANGE UP (ref 1.6–2.6)
MAGNESIUM SERPL-MCNC: 2 MG/DL — SIGNIFICANT CHANGE UP (ref 1.6–2.6)
MANUAL SMEAR VERIFICATION: SIGNIFICANT CHANGE UP
MCHC RBC-ENTMCNC: 31.4 PG — SIGNIFICANT CHANGE UP (ref 27–34)
MCHC RBC-ENTMCNC: 32.2 PG — SIGNIFICANT CHANGE UP (ref 27–34)
MCHC RBC-ENTMCNC: 34.3 GM/DL — SIGNIFICANT CHANGE UP (ref 32–36)
MCHC RBC-ENTMCNC: 34.3 GM/DL — SIGNIFICANT CHANGE UP (ref 32–36)
MCV RBC AUTO: 91.6 FL — SIGNIFICANT CHANGE UP (ref 80–100)
MCV RBC AUTO: 93.9 FL — SIGNIFICANT CHANGE UP (ref 80–100)
MONOCYTES # BLD AUTO: 0.03 K/UL — SIGNIFICANT CHANGE UP (ref 0–0.9)
MONOCYTES NFR BLD AUTO: 3.2 % — SIGNIFICANT CHANGE UP (ref 2–14)
NEUTROPHILS # BLD AUTO: 0.69 K/UL — LOW (ref 1.8–7.4)
NEUTROPHILS NFR BLD AUTO: 83.9 % — HIGH (ref 43–77)
NRBC # BLD: 0 /100 WBCS — SIGNIFICANT CHANGE UP (ref 0–0)
NRBC # BLD: 2 /100 — HIGH (ref 0–0)
PHOSPHATE SERPL-MCNC: 2.7 MG/DL — SIGNIFICANT CHANGE UP (ref 2.5–4.5)
PHOSPHATE SERPL-MCNC: 2.8 MG/DL — SIGNIFICANT CHANGE UP (ref 2.5–4.5)
PLAT MORPH BLD: NORMAL — SIGNIFICANT CHANGE UP
PLATELET # BLD AUTO: 77 K/UL — LOW (ref 150–400)
PLATELET # BLD AUTO: 94 K/UL — LOW (ref 150–400)
POTASSIUM SERPL-MCNC: 3.3 MMOL/L — LOW (ref 3.5–5.3)
POTASSIUM SERPL-MCNC: 4.1 MMOL/L — SIGNIFICANT CHANGE UP (ref 3.5–5.3)
POTASSIUM SERPL-SCNC: 3.3 MMOL/L — LOW (ref 3.5–5.3)
POTASSIUM SERPL-SCNC: 4.1 MMOL/L — SIGNIFICANT CHANGE UP (ref 3.5–5.3)
PROT SERPL-MCNC: 6.5 G/DL — SIGNIFICANT CHANGE UP (ref 6–8.3)
PROT SERPL-MCNC: 6.5 G/DL — SIGNIFICANT CHANGE UP (ref 6–8.3)
RBC # BLD: 2.26 M/UL — LOW (ref 4.2–5.8)
RBC # BLD: 2.3 M/UL — LOW (ref 4.2–5.8)
RBC # FLD: 15.7 % — HIGH (ref 10.3–14.5)
RBC # FLD: 16.3 % — HIGH (ref 10.3–14.5)
RBC BLD AUTO: SIGNIFICANT CHANGE UP
SODIUM SERPL-SCNC: 136 MMOL/L — SIGNIFICANT CHANGE UP (ref 135–145)
SODIUM SERPL-SCNC: 137 MMOL/L — SIGNIFICANT CHANGE UP (ref 135–145)
SP GR UR STRIP: 1.01 — LOW (ref 1.01–1.02)
WBC # BLD: 0.56 K/UL — CRITICAL LOW (ref 3.8–10.5)
WBC # BLD: 0.82 K/UL — CRITICAL LOW (ref 3.8–10.5)
WBC # FLD AUTO: 0.56 K/UL — CRITICAL LOW (ref 3.8–10.5)
WBC # FLD AUTO: 0.82 K/UL — CRITICAL LOW (ref 3.8–10.5)

## 2023-06-11 PROCEDURE — 99233 SBSQ HOSP IP/OBS HIGH 50: CPT

## 2023-06-11 PROCEDURE — 93010 ELECTROCARDIOGRAM REPORT: CPT

## 2023-06-11 RX ORDER — ACETAMINOPHEN 500 MG
500 TABLET ORAL ONCE
Refills: 0 | Status: COMPLETED | OUTPATIENT
Start: 2023-06-11 | End: 2023-06-11

## 2023-06-11 RX ORDER — LOPERAMIDE HCL 2 MG
2 TABLET ORAL EVERY 4 HOURS
Refills: 0 | Status: DISCONTINUED | OUTPATIENT
Start: 2023-06-11 | End: 2023-06-30

## 2023-06-11 RX ORDER — POTASSIUM CHLORIDE 20 MEQ
40 PACKET (EA) ORAL ONCE
Refills: 0 | Status: COMPLETED | OUTPATIENT
Start: 2023-06-11 | End: 2023-06-11

## 2023-06-11 RX ADMIN — ONDANSETRON 8 MILLIGRAM(S): 8 TABLET, FILM COATED ORAL at 21:40

## 2023-06-11 RX ADMIN — Medication 20 MILLIGRAM(S): at 15:58

## 2023-06-11 RX ADMIN — Medication 400 MILLIGRAM(S): at 06:31

## 2023-06-11 RX ADMIN — CHLORHEXIDINE GLUCONATE 1 APPLICATION(S): 213 SOLUTION TOPICAL at 06:32

## 2023-06-11 RX ADMIN — CEFEPIME 100 MILLIGRAM(S): 1 INJECTION, POWDER, FOR SOLUTION INTRAMUSCULAR; INTRAVENOUS at 14:01

## 2023-06-11 RX ADMIN — FLUCONAZOLE 400 MILLIGRAM(S): 150 TABLET ORAL at 12:25

## 2023-06-11 RX ADMIN — Medication 10 MILLILITER(S): at 11:01

## 2023-06-11 RX ADMIN — Medication 200 MILLIGRAM(S): at 21:39

## 2023-06-11 RX ADMIN — Medication 5 MILLILITER(S): at 07:34

## 2023-06-11 RX ADMIN — Medication 10 MILLILITER(S): at 00:27

## 2023-06-11 RX ADMIN — Medication 1 TABLET(S): at 12:26

## 2023-06-11 RX ADMIN — Medication 650 MILLIGRAM(S): at 16:48

## 2023-06-11 RX ADMIN — Medication 1 LOZENGE: at 19:56

## 2023-06-11 RX ADMIN — MESNA 1200 MILLIGRAM(S): 100 INJECTION, SOLUTION INTRAVENOUS at 17:03

## 2023-06-11 RX ADMIN — MESNA 1200 MILLIGRAM(S): 100 INJECTION, SOLUTION INTRAVENOUS at 19:56

## 2023-06-11 RX ADMIN — Medication 2 MILLIGRAM(S): at 16:35

## 2023-06-11 RX ADMIN — Medication 400 MILLIGRAM(S): at 13:22

## 2023-06-11 RX ADMIN — Medication 5 MILLILITER(S): at 11:00

## 2023-06-11 RX ADMIN — CEFEPIME 100 MILLIGRAM(S): 1 INJECTION, POWDER, FOR SOLUTION INTRAMUSCULAR; INTRAVENOUS at 06:36

## 2023-06-11 RX ADMIN — MESNA 1200 MILLIGRAM(S): 100 INJECTION, SOLUTION INTRAVENOUS at 22:54

## 2023-06-11 RX ADMIN — FAMOTIDINE 20 MILLIGRAM(S): 10 INJECTION INTRAVENOUS at 17:03

## 2023-06-11 RX ADMIN — Medication 400 MILLIGRAM(S): at 21:39

## 2023-06-11 RX ADMIN — Medication 5 MILLILITER(S): at 00:28

## 2023-06-11 RX ADMIN — Medication 40 MILLIGRAM(S): at 07:34

## 2023-06-11 RX ADMIN — MESNA 1200 MILLIGRAM(S): 100 INJECTION, SOLUTION INTRAVENOUS at 14:01

## 2023-06-11 RX ADMIN — MESNA 1200 MILLIGRAM(S): 100 INJECTION, SOLUTION INTRAVENOUS at 11:00

## 2023-06-11 RX ADMIN — URSODIOL 300 MILLIGRAM(S): 250 TABLET, FILM COATED ORAL at 17:03

## 2023-06-11 RX ADMIN — Medication 10 MILLILITER(S): at 07:35

## 2023-06-11 RX ADMIN — Medication 1 MILLIGRAM(S): at 12:25

## 2023-06-11 RX ADMIN — Medication 1 LOZENGE: at 15:41

## 2023-06-11 RX ADMIN — Medication 1 LOZENGE: at 11:00

## 2023-06-11 RX ADMIN — APREPITANT 80 MILLIGRAM(S): 80 CAPSULE ORAL at 12:25

## 2023-06-11 RX ADMIN — URSODIOL 300 MILLIGRAM(S): 250 TABLET, FILM COATED ORAL at 06:31

## 2023-06-11 RX ADMIN — Medication 10 MILLILITER(S): at 15:42

## 2023-06-11 RX ADMIN — Medication 3860 MILLIGRAM(S): at 11:15

## 2023-06-11 RX ADMIN — Medication 40 MILLIEQUIVALENT(S): at 21:38

## 2023-06-11 RX ADMIN — ONDANSETRON 8 MILLIGRAM(S): 8 TABLET, FILM COATED ORAL at 06:32

## 2023-06-11 RX ADMIN — Medication 5 MILLILITER(S): at 19:56

## 2023-06-11 RX ADMIN — Medication 1 LOZENGE: at 00:27

## 2023-06-11 RX ADMIN — Medication 1 LOZENGE: at 07:35

## 2023-06-11 RX ADMIN — ONDANSETRON 8 MILLIGRAM(S): 8 TABLET, FILM COATED ORAL at 13:22

## 2023-06-11 RX ADMIN — Medication 5 MILLILITER(S): at 15:41

## 2023-06-11 RX ADMIN — CEFEPIME 100 MILLIGRAM(S): 1 INJECTION, POWDER, FOR SOLUTION INTRAMUSCULAR; INTRAVENOUS at 21:41

## 2023-06-11 RX ADMIN — SODIUM CHLORIDE 288 MILLILITER(S): 9 INJECTION, SOLUTION INTRAVENOUS at 15:58

## 2023-06-11 RX ADMIN — FAMOTIDINE 20 MILLIGRAM(S): 10 INJECTION INTRAVENOUS at 06:31

## 2023-06-11 RX ADMIN — Medication 650 MILLIGRAM(S): at 16:41

## 2023-06-11 RX ADMIN — Medication 10 MILLILITER(S): at 19:56

## 2023-06-11 RX ADMIN — Medication 500 MILLIGRAM(S): at 22:00

## 2023-06-11 NOTE — PROGRESS NOTE ADULT - NS ATTEND AMEND GEN_ALL_CORE FT
1. Admit to BMTU Today is day + 2  2. TLC placement in IR   3. Day -6 - day + 5 - antiemetics   4. Day - 6 start CTX hydration - 1/2NS @ 200ml /hr    5. Strict I&O, daily weights, prn diuresis   6. Day -6 - day -2 - Fludarabine 30mg/m2 IV  7. Day -6 - day - 5 - CTX 14.5mg/kg/day IV. CTX to start after urine SG < 1.010. Mesna  12mg / kg - hemorrhagic cystitis ppx   8. Day -2 - IVIG 0.4 g/kg (ideal body weight) every 3 weeks. Premedication with Tylenol and benadryl  9. Day -1 -  cGy x 1 dose   10. Day - 1 - at 2200 begin transplant hydration : 0.45Ns + 1 amp (50mEq) sodium bicarbonate at 150ml /hr; continue transplant hydration for 24 hours post infusion   11. Day 0 – HPC transplant   12. Day + 2 at 2200 - begin CTX hydration (0.45NS + 10 mEq KCl/ @150ml /m2  continue 24 hours post last dose of CTX   13. Day + 3 - + 4 - CTX 50mg/kg/day IV. Begin CTX when urine SG < 1.010. EKG daily. Mesna for hemorraghic cystitis ppx.   14. Day + 5 - start Zarxio,  MMF and Tacrolimus. Check Tacrolimus levels on Monday and Thursday.   15. Anxiolytics, antinausea, antidiarrhea medications as needed   16. Lasix PRN while being aggressively hydrated to avoid VOD   17. Nutritional support, pain management  Need for prophylactic measures:  1. VOD prophylaxis - low dose heparin gtt (dosed at 100 units / kg / day), glutamine supplementation, Actigall BID   2. PCP prophylaxis - Bactrim DS through day -2    3. Antiviral prophylaxis - Continue Acyclovir   4. Antifungal prophylaxis- Diflucan 400 mg po daily.  5.. GI prophylaxis - Protonix po QD   6. Antibacterial prophylaxis -  ANC < 500, started Cipro 500mg po BID. If becomes febrile, pan cx, CXR and change Cipro to Cefepime 2g IV q 8 hours. Continue until count recovery..slight rise in wbc noted after infusion of marrow  7. Aggressive mouth care and skin care as per protocol  8. GVHD prophylaxis- high dose CTX; MMF and Tacrolimus.  9. transfusion and electrolyte support    Patient with hemoglobin drop of ~2  on 6/4, likely due to chemotherapy however will repeat CBC tonight to establish stability, likely will require transfusion.  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 1. Admit to BMTU Today is day + 3  2. TLC placement in IR   3. Day -6 - day + 5 - antiemetics   4. Day - 6 start CTX hydration - 1/2NS @ 200ml /hr    5. Strict I&O, daily weights, prn diuresis   6. Day -6 - day -2 - Fludarabine 30mg/m2 IV  7. Day -6 - day - 5 - CTX 14.5mg/kg/day IV. CTX to start after urine SG < 1.010. Mesna  12mg / kg - hemorrhagic cystitis ppx   8. Day -2 - IVIG 0.4 g/kg (ideal body weight) every 3 weeks. Premedication with Tylenol and benadryl  9. Day -1 -  cGy x 1 dose   10. Day - 1 - at 2200 begin transplant hydration : 0.45Ns + 1 amp (50mEq) sodium bicarbonate at 150ml /hr; continue transplant hydration for 24 hours post infusion   11. Day 0 – HPC transplant   12. Day + 2 at 2200 - begin CTX hydration (0.45NS + 10 mEq KCl/ @150ml /m2  continue 24 hours post last dose of CTX   13. Day + 3 - + 4 - CTX 50mg/kg/day IV. Begin CTX when urine SG < 1.010. EKG daily. Mesna for hemorraghic cystitis ppx.   14. Day + 5 - start Zarxio,  MMF and Tacrolimus. Check Tacrolimus levels on Monday and Thursday.   15. Anxiolytics, antinausea, antidiarrhea medications as needed   16. Lasix PRN while being aggressively hydrated to avoid VOD   17. Nutritional support, pain management  Need for prophylactic measures:  1. VOD prophylaxis - low dose heparin gtt (dosed at 100 units / kg / day), glutamine supplementation, Actigall BID   2. PCP prophylaxis - Bactrim DS through day -2    3. Antiviral prophylaxis - Continue Acyclovir   4. Antifungal prophylaxis- Diflucan 400 mg po daily.  5.. GI prophylaxis - Protonix po QD   6. Antibacterial prophylaxis -  ANC < 500, started Cipro 500mg po BID. If becomes febrile, pan cx, CXR and change Cipro to Cefepime 2g IV q 8 hours. Continue until count recovery..slight rise in wbc noted after infusion of marrow  7. Aggressive mouth care and skin care as per protocol  8. GVHD prophylaxis- high dose CTX; MMF and Tacrolimus.  9. transfusion and electrolyte support    Patient with hemoglobin drop of ~2  on 6/4, likely due to chemotherapy however will repeat CBC tonight to establish stability, likely will require transfusion.  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  6/11 Febrile to 100.6F. Cipro switched to cefepime. Infectious work up ordered.

## 2023-06-11 NOTE — CHART NOTE - NSCHARTNOTEFT_GEN_A_CORE
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  Patient seen and examined patient at bedside.  Patient is alert, denies HA, visual changes, CP, SOB, cough, N/V, dysuria, or abd pain.    >VITAL SIGNS:  T(C): 38.4 (06-11-23 @ 22:30), Max: 38.5 (06-11-23 @ 16:39)  T(F): 101.1 (06-11-23 @ 22:30), Max: 101.3 (06-11-23 @ 16:39)  HR: 116 (06-11-23 @ 22:30) (98 - 116)  BP: 104/55 (06-11-23 @ 22:30) (104/55 - 140/84)  RR: 20 (06-11-23 @ 22:30) (18 - 20)  SpO2: 98% (06-11-23 @ 22:30) (98% - 100%)      >Review Of Systems:   CONSTITUTIONAL:  No fever.   No chills  EYES: No visual changes.    ENT:  No  throat pain.  No neck stiffness  RESPIRATORY: No cough, wheezing, hemoptysis; No shortness of breath  CARDIOVASCULAR: No chest pain or palpitations  GASTROINTESTINAL: No abdominal or epigastric pain.  No diarrhea.    GENITOURINARY: No dysuria, frequency or hematuria  NEUROLOGICAL: No numbness or weakness  SKIN: No itching, burning, rashes, or lesions       >PHYSICAL EXAM:  Constitutional: AOx3. NAD.  Cardiovascular: +S1 S2. RRR.  No murmurs.  No LE edema.  Respiratory:  Even, unlabored.  Clear lungs bilaterally. No wheezing, rhonchi, or crackles.  Gastrointestinal: +BS X4 active. Soft. Nontender. Nondistended   Skin:  +Feverish to touch     >MEDICATIONS:    MEDICATIONS  (STANDING):  acetaminophen     Tablet .. 650 milliGRAM(s) Oral every 6 hours  acyclovir   Oral Tab/Cap 400 milliGRAM(s) Oral every 8 hours  aprepitant (Chemo) 80 milliGRAM(s) Oral every 24 hours  Biotene Dry Mouth Oral Rinse 5 milliLiter(s) Swish and Spit five times a day  cefepime   IVPB 2000 milliGRAM(s) IV Intermittent every 8 hours  cefepime   IVPB      Chemotherapy Worklist Order      chlorhexidine 4% Liquid 1 Application(s) Topical <User Schedule>  clotrimazole Lozenge 1 Lozenge Oral five times a day  famotidine    Tablet 20 milliGRAM(s) Oral two times a day  fluconAZOLE   Tablet 400 milliGRAM(s) Oral daily  folic acid 1 milliGRAM(s) Oral daily  heparin  Infusion 424 Unit(s)/Hr (4.24 mL/Hr) IV Continuous <Continuous>  Infuse HPC Product      mesna IVPB (eMAR) 1200 milliGRAM(s) IV Intermittent every 3 hours  multivitamin 1 Tablet(s) Oral daily  ondansetron Injectable (Chemo) 8 milliGRAM(s) IV Push every 8 hours  sodium bicarbonate  Infusion (Chemo) 0.074 mEq/kG/Hr (150 mL/Hr) IV Continuous <Continuous>  sodium bicarbonate Mouth Rinse 10 milliLiter(s) Swish and Spit five times a day  sodium chloride 0.9% 1000 milliLiter(s) (288 mL/Hr) IV Continuous <Continuous>  sodium chloride 0.9%. 1000 milliLiter(s) (20 mL/Hr) IV Continuous <Continuous>  sodium chloride 0.9%. (Chemo) 1000 milliLiter(s) (200 mL/Hr) IV Continuous <Continuous>  ursodiol Capsule 300 milliGRAM(s) Oral two times a day      >LABORATORY:                          7.1    0.56  )-----------( 77       ( 11 Jun 2023 20:37 )             20.7       06-11    136  |  104  |  8   ----------------------------<  118<H>  3.3<L>   |  22  |  0.77    Ca    8.3<L>      11 Jun 2023 20:37  Phos  2.8     06-11  Mg     1.9     06-11    TPro  6.5  /  Alb  4.1  /  TBili  0.8  /  DBili  x   /  AST  17  /  ALT  31  /  AlkPhos  84  06-11            >RADIOLOGY:    CXR: < from: Xray Chest 1 View- PORTABLE-Urgent (Xray Chest 1 View- PORTABLE-Urgent .) (06.10.23 @ 22:38) >      ACC: [id number] EXAM:  XR CHEST PORTABLE URGENT 1V   ORDERED BY:  ROSEMARIE ONEIL     PROCEDURE DATE:  06/10/2023          INTERPRETATION:  INDICATION: Febrile    COMPARISON: 12/30/21    Technique: AP radiograph of the chest    FINDINGS:  Right IJ central venous catheter tip in lower SVC.  Heart/Vascular: Cardiomediastinal silhouette is within normal limits.  Pulmonary: Lungs are clear. No pleural effusion or pneumothorax.  Bones: No acute bony finding.    Impression:  Clear lungs.        --- End of Report ---            SAGRARIO MONAE MD; Attending Radiologist  This document has been electronically signed. 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 Hematologic history as follows: initially diagnosed by bone marrow biopsy 4/20, began treatment 4/23/20 with horse ATG + CSA + promacta and achieved CR1. A bone marrow biopsy 11/9/20 showed hypocellular marrow, with overall improved cellularity showing marrow regeneration. Bone marrow biopsy 5/18/21 showed normal morphology and normal cellularity. He relapsed 11/2021, developing worsening cytopenias and neutropenic fevers. He was then treated with rabbit ATG, prednisone, CSA and promacta with partial response 12/27/21. He later developed worsening hemolysis and pancytopenia, with a rising PNH clone and was treated with Ravulizumab. He has no complaints on admission.  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  Patient seen and examined patient at bedside.  Patient is alert, denies HA, visual changes, CP, SOB, cough, N/V, dysuria, or abd pain.    >VITAL SIGNS:  T(C): 38.4 (06-11-23 @ 22:30), Max: 38.5 (06-11-23 @ 16:39)  T(F): 101.1 (06-11-23 @ 22:30), Max: 101.3 (06-11-23 @ 16:39)  HR: 116 (06-11-23 @ 22:30) (98 - 116)  BP: 104/55 (06-11-23 @ 22:30) (104/55 - 140/84)  RR: 20 (06-11-23 @ 22:30) (18 - 20)  SpO2: 98% (06-11-23 @ 22:30) (98% - 100%)      >Review Of Systems:   CONSTITUTIONAL:  No fever.   No chills  EYES: No visual changes.    ENT:  No  throat pain.  No neck stiffness  RESPIRATORY: No cough, wheezing, hemoptysis; No shortness of breath  CARDIOVASCULAR: No chest pain or palpitations  GASTROINTESTINAL: No abdominal or epigastric pain.  No diarrhea.    GENITOURINARY: No dysuria, frequency or hematuria  NEUROLOGICAL: No numbness or weakness  SKIN: No itching, burning, rashes, or lesions       >PHYSICAL EXAM:  Constitutional: AOx3. NAD.  Cardiovascular: +S1 S2. RRR.  No murmurs.  No LE edema.  Respiratory:  Even, unlabored.  Clear lungs bilaterally. No wheezing, rhonchi, or crackles.  Gastrointestinal: +BS X4 active. Soft. Nontender. Nondistended   Skin:  +Feverish to touch     >MEDICATIONS:    MEDICATIONS  (STANDING):  acetaminophen     Tablet .. 650 milliGRAM(s) Oral every 6 hours  acyclovir   Oral Tab/Cap 400 milliGRAM(s) Oral every 8 hours  aprepitant (Chemo) 80 milliGRAM(s) Oral every 24 hours  Biotene Dry Mouth Oral Rinse 5 milliLiter(s) Swish and Spit five times a day  cefepime   IVPB 2000 milliGRAM(s) IV Intermittent every 8 hours  cefepime   IVPB      Chemotherapy Worklist Order      chlorhexidine 4% Liquid 1 Application(s) Topical <User Schedule>  clotrimazole Lozenge 1 Lozenge Oral five times a day  famotidine    Tablet 20 milliGRAM(s) Oral two times a day  fluconAZOLE   Tablet 400 milliGRAM(s) Oral daily  folic acid 1 milliGRAM(s) Oral daily  heparin  Infusion 424 Unit(s)/Hr (4.24 mL/Hr) IV Continuous <Continuous>  Infuse HPC Product      mesna IVPB (eMAR) 1200 milliGRAM(s) IV Intermittent every 3 hours  multivitamin 1 Tablet(s) Oral daily  ondansetron Injectable (Chemo) 8 milliGRAM(s) IV Push every 8 hours  sodium bicarbonate  Infusion (Chemo) 0.074 mEq/kG/Hr (150 mL/Hr) IV Continuous <Continuous>  sodium bicarbonate Mouth Rinse 10 milliLiter(s) Swish and Spit five times a day  sodium chloride 0.9% 1000 milliLiter(s) (288 mL/Hr) IV Continuous <Continuous>  sodium chloride 0.9%. 1000 milliLiter(s) (20 mL/Hr) IV Continuous <Continuous>  sodium chloride 0.9%. (Chemo) 1000 milliLiter(s) (200 mL/Hr) IV Continuous <Continuous>  ursodiol Capsule 300 milliGRAM(s) Oral two times a day      >LABORATORY:                          7.1    0.56  )-----------( 77       ( 11 Jun 2023 20:37 )             20.7       06-11    136  |  104  |  8   ----------------------------<  118<H>  3.3<L>   |  22  |  0.77    Ca    8.3<L>      11 Jun 2023 20:37  Phos  2.8     06-11  Mg     1.9     06-11    TPro  6.5  /  Alb  4.1  /  TBili  0.8  /  DBili  x   /  AST  17  /  ALT  31  /  AlkPhos  84  06-11            >RADIOLOGY:    CXR: < from: Xray Chest 1 View- PORTABLE-Urgent (Xray Chest 1 View- PORTABLE-Urgent .) (06.10.23 @ 22:38) >      ACC: [id number] EXAM:  XR CHEST PORTABLE URGENT 1V   ORDERED BY:  ROSEMARIE ONEIL     PROCEDURE DATE:  06/10/2023          INTERPRETATION:  INDICATION: Febrile    COMPARISON: 12/30/21    Technique: AP radiograph of the chest    FINDINGS:  Right IJ central venous catheter tip in lower SVC.  Heart/Vascular: Cardiomediastinal silhouette is within normal limits.  Pulmonary: Lungs are clear. No pleural effusion or pneumothorax.  Bones: No acute bony finding.    Impression:  Clear lungs.        --- End of Report ---            SAGRARIO MONAE MD; Attending Radiologist  This document has been electronically signed. 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 Hematologic history as follows: initially diagnosed by bone marrow biopsy 4/20, began treatment 4/23/20 with horse ATG + CSA + promacta and achieved CR1. A bone marrow biopsy 11/9/20 showed hypocellular marrow, with overall improved cellularity showing marrow regeneration. Bone marrow biopsy 5/18/21 showed normal morphology and normal cellularity. He relapsed 11/2021, developing worsening cytopenias and neutropenic fevers. He was then treated with rabbit ATG, prednisone, CSA and promacta with partial response 12/27/21. He later developed worsening hemolysis and pancytopenia, with a rising PNH clone and was treated with Ravulizumab. He has no complaints on admission.  (02 Jun 2023 12:14)      1) Fever - Recurrent   - Tylenol / Cooling measures prn for Pyrexia  - Cultures up to date, last pancultured on 6/10, f/u results  - CXR done 6/10, refer above   - c/w Current Abx/ Antifungal/ IVF regimen   - Monitor VS, , consider EKG if HR still elevated after resolution of fever  - ID on board, f/u with team  - F/U Primary  care team in AM     ADDENDUM 6/12/23 at 03:06--pt now febrile 102.4F, tocilizumab x1 ordered w/ premedication of Benadryl 50mg PO x1 and Ofirmev x1.  Will continue to closely assess and monitor  Will endorse to day team     Rosemarie Oneil PA-C  Department of Medicine  [facility] [id number]

## 2023-06-11 NOTE — PROGRESS NOTE ADULT - SUBJECTIVE AND OBJECTIVE BOX
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 () with Flu / Cy / TBI prep regimen for treatment of severe aplastic anemia    S: Patient seen and examined with HPC Transplant Team:     O:     Vital Signs Last 24 Hrs  T(C): 37 (2023 08:55), Max: 38.1 (10 [facility] 2023 21:24)  T(F): 98.6 (2023 08:55), Max: 100.6 (10 [facility] 2023 21:24)  HR: 100 (:55) (98 - 113)  BP: 113/71 (2023 08:55) (105/65 - 142/82)  BP(mean): --  RR: 18 (2023 08:55) (18 - 18)  SpO2: 99% (:) (98% - 100%)    Parameters below as of 2023 08:55  Patient On (Oxygen Delivery Method): room air        Admit weight: 102.8kg  Daily Weight in k (2023 08:55)    Intake / Output:   06-10 @ 07: @ 07:00  --------------------------------------------------------  IN: 3787.5 mL / OUT: 2200 mL / NET: 1587.5 mL     @ 07: @ 10:08  --------------------------------------------------------  IN: 1069.2 mL / OUT: 2650 mL / NET: -1580.8 mL      PE:   Oropharynx: Clear  Oral Mucositis: (-)                                                        Grade:   CVS: RRR, +S1,S2  Lungs: CTA B/L  Abdomen: Soft, NT, ND, +BS  Extremities: No edema in BLE  Gastric Mucositis: (-)                                                 Grade:   Intestinal Mucositis:   (-)                                           Grade:   Skin: Intact  TLC: CDI  Neuro: Alert, Ox3  Pain: Denies      Labs:   CBC Full  -  ( 2023 07:13 )  WBC Count : 0.82 K/uL  Hemoglobin : 7.4 g/dL  Hematocrit : 21.6 %  Platelet Count - Automated : 94 K/uL  Mean Cell Volume : 93.9 fl  Mean Cell Hemoglobin : 32.2 pg  Mean Cell Hemoglobin Concentration : 34.3 gm/dL  Auto Neutrophil # : 0.69 K/uL  Auto Lymphocyte # : 0.00 K/uL  Auto Monocyte # : 0.03 K/uL  Auto Eosinophil # : 0.09 K/uL  Auto Basophil # : 0.01 K/uL  Auto Neutrophil % : 83.9 %  Auto Lymphocyte % : 0.0 %  Auto Monocyte % : 3.2 %  Auto Eosinophil % : 11.3 %  Auto Basophil % : 1.6 %                          7.4    0.82  )-----------( 94       ( 2023 07:13 )             21.6     06-11    137  |  106  |  10  ----------------------------<  96  4.1   |  22  |  0.70    Ca    8.4      2023 07:10  Phos  2.7     06-11  Mg     2.0     -11    TPro  6.5  /  Alb  3.9  /  TBili  1.0  /  DBili  x   /  AST  18  /  ALT  34  /  AlkPhos  84  06-11      LIVER FUNCTIONS - ( 2023 07:10 )  Alb: 3.9 g/dL / Pro: 6.5 g/dL / ALK PHOS: 84 U/L / ALT: 34 U/L / AST: 18 U/L / GGT: x           Lactate Dehydrogenase, Serum: 278 U/L ( @ 07:10)        Cultures:         Radiology:       Meds:   Antimicrobials:   acyclovir   Oral Tab/Cap 400 milliGRAM(s) Oral every 8 hours  cefepime   IVPB      cefepime   IVPB 2000 milliGRAM(s) IV Intermittent every 8 hours  clotrimazole Lozenge 1 Lozenge Oral five times a day  fluconAZOLE   Tablet 400 milliGRAM(s) Oral daily      Heme / Onc:   cyclophosphamide IVPB (eMAR) 3860 milliGRAM(s) IV Intermittent daily  heparin  Infusion 424 Unit(s)/Hr IV Continuous <Continuous>  mesna IVPB (eMAR) 1200 milliGRAM(s) IV Intermittent every 3 hours      GI:  famotidine    Tablet 20 milliGRAM(s) Oral two times a day  senna 1 Tablet(s) Oral at bedtime PRN  sodium bicarbonate Mouth Rinse 10 milliLiter(s) Swish and Spit five times a day  ursodiol Capsule 300 milliGRAM(s) Oral two times a day      Cardiovascular:   furosemide   Injectable (Chemo) 20 milliGRAM(s) IV Push once      Immunologic:       Other medications:   acetaminophen     Tablet .. 650 milliGRAM(s) Oral every 6 hours  aprepitant (Chemo) 80 milliGRAM(s) Oral every 24 hours  Biotene Dry Mouth Oral Rinse 5 milliLiter(s) Swish and Spit five times a day  Chemotherapy Worklist Order      chlorhexidine 4% Liquid 1 Application(s) Topical <User Schedule>  folic acid 1 milliGRAM(s) Oral daily  Infuse HPC Product      multivitamin 1 Tablet(s) Oral daily  ondansetron Injectable (Chemo) 8 milliGRAM(s) IV Push every 8 hours  sodium bicarbonate  Infusion (Chemo) 0.074 mEq/kG/Hr IV Continuous <Continuous>  sodium chloride 0.9% 1000 milliLiter(s) IV Continuous <Continuous>  sodium chloride 0.9%. 1000 milliLiter(s) IV Continuous <Continuous>  sodium chloride 0.9%. (Chemo) 1000 milliLiter(s) IV Continuous <Continuous>      PRN:   acetaminophen     Tablet .. 650 milliGRAM(s) Oral every 6 hours PRN  diphenhydrAMINE Injectable 25 milliGRAM(s) IV Push every 12 hours PRN  prochlorperazine   Injectable 10 milliGRAM(s) IV Push every 6 hours PRN  senna 1 Tablet(s) Oral at bedtime PRN  sodium chloride 0.9% lock flush 10 milliLiter(s) IV Push every 1 hour PRN      A/P:  37 year old male with severe aplastic anemia with multiple lines of treatment admitted for a haplo-identical bone marrow transplant with  Flu / Cy / TBI preparative regimen   Day + 3  start CTX hydration - 1/2NS @ 200ml /hr    Strict I&O, daily weights, prn diuresis   Day -6 - day -2 - Fludarabine 30mg/m2 IV  Day -6 - day - 5 - CTX 14.5mg/kg/day IV. CTX to start after urine SG < 1.010. Mesna  12mg / kg - hemorrhagic cystitis ppx   Day -2 - IVIG 0.4 g/kg (ideal body weight) every 3 weeks. Premedication with Tylenol and benadryl   Day -1 -  cGy x 1 dose   Day - 1 - at 2200 begin transplant hydration : 0.45Ns + 1 amp (50mEq) sodium bicarbonate at 150ml /hr; continue transplant hydration for 24 hours post infusion    Day + 2 at 2200 - begin CTX hydration (0.45NS + 10 mEq KCl/ @150ml /m2  continue 24 hours post last dose of CTX   Day + 3 - + 4 - CTX 50mg/kg/day IV. Begin CTX when urine SG < 1.010. EKG daily. Mesna for hemorraghic cystitis ppx.   Day + 5 - start Zarxio,  MMF and Tacrolimus. Check Tacrolimus levels on Monday and Thursday.   when ANC < 500, start Cipro 500mg po BID. If becomes febrile, pan cx, CXR and change Cipro to Cefepime 2g IV q 8 hours. Continue until count recovery  - Fludarabine , intermittent nausea, continue antiemetics   - Fludarabine , IVIG today    TBI today  - HPC transplant today. Continue transplant hydration for 24 hours post infusion of bone marrow   - monitor for CRS / haplo storm - if T >/= 102.5 on days +1 or +2, would dose tocilizumab 8mg / kg IVPB x 1     1. Infectious Disease:   acyclovir   Oral Tab/Cap 400 milliGRAM(s) Oral every 8 hours  cefepime   IVPB      cefepime   IVPB 2000 milliGRAM(s) IV Intermittent every 8 hours  clotrimazole Lozenge 1 Lozenge Oral five times a day  fluconAZOLE   Tablet 400 milliGRAM(s) Oral daily    2. VOD Prophylaxis: Actigall, Glutamine, Heparin (dosed at 100 units / kg / day)     3. GI Prophylaxis:   famotidine    Tablet 20 milliGRAM(s) Oral two times a day    4. Mouthcare - NS / NaHCO3 rinses, Mycelex, Biotene; Skin care     5. GVHD prophylaxis   TBI day -1   CTX days +3, +4  MMF, tacrolimus to start on day +5     6. Transfuse & replete electrolytes prn     7. IV hydration, daily weights, strict I&O, prn diuresis     8. PO intake as tolerated, nutrition follow up as needed, MVI, folic acid     9. Antiemetics, anti-diarrhea medications:   LORazepam   Injectable 1 milliGRAM(s) IV Push every 6 hours PRN  prochlorperazine   Injectable 10 milliGRAM(s) IV Push every 6 hours PRN  ondansetron Injectable (Chemo) 8 milliGRAM(s) IV Push every 8 hours  aprepitant (Chemo) 80 milliGRAM(s) Oral every 24 hours    10. OOB as tolerated, physical therapy consult if needed     11. Monitor coags / fibrinogen 2x week, vitamin K as needed     12. Monitor closely for clinical changes, monitor for fevers     13. Emotional support provided, plan of care discussed and questions addressed     14. Patient education done regarding chemotherapy prep, plan of care, restrictions and discharge planning     15. Continue regular social work input     I have written the above note for Dr. Wilder who performed service with me in the room.   MARGUERITE ([phone])    I have seen and examined patient with PA, I agree with above note as scribed.                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 () with Flu / Cy / TBI prep regimen for treatment of severe aplastic anemia    S: Patient seen and examined with HPC Transplant Team:   +Febrile  +Diarrhea x5, very loose, watery  +fatigue    O: Rest of ROS negative    Vital Signs Last 24 Hrs  T(C): 37 (2023 08:55), Max: 38.1 (10 [place] 2023 21:24)  T(F): 98.6 (2023 08:55), Max: 100.6 (10 [place] 2023 21:24)  HR: 100 (2023 08:55) (98 - 113)  BP: 113/71 (2023 08:55) (105/65 - 142/82)  BP(mean): --  RR: 18 (2023 08:55) (18 - 18)  SpO2: 99% (2023 08:55) (98% - 100%)    Parameters below as of 2023 08:55  Patient On (Oxygen Delivery Method): room air      Admit weight: 102.8kg  Daily Weight in k (2023 08:55)    Intake / Output:   10 @ 07: @ 07:00  --------------------------------------------------------  IN: 3787.5 mL / OUT: 2200 mL / NET: 1587.5 mL     @ 07:01  -  11 @ 10:08  --------------------------------------------------------  IN: 1069.2 mL / OUT: 2650 mL / NET: -1580.8 mL      PE:   Oropharynx: Clear  Oral Mucositis: (-)                                                        Grade:   CVS: RRR, +S1,S2  Lungs: CTA B/L  Abdomen: Soft, NT, ND, +BS  Extremities: No edema in BLE  Gastric Mucositis: (-)                                                 Grade:   Intestinal Mucositis:   (-)                                           Grade:   Skin: Intact  TLC: CDI  Neuro: Alert, Ox3  Pain: Denies      Labs:   CBC Full  -  ( 2023 07:13 )  WBC Count : 0.82 K/uL  Hemoglobin : 7.4 g/dL  Hematocrit : 21.6 %  Platelet Count - Automated : 94 K/uL  Mean Cell Volume : 93.9 fl  Mean Cell Hemoglobin : 32.2 pg  Mean Cell Hemoglobin Concentration : 34.3 gm/dL  Auto Neutrophil # : 0.69 K/uL  Auto Lymphocyte # : 0.00 K/uL  Auto Monocyte # : 0.03 K/uL  Auto Eosinophil # : 0.09 K/uL  Auto Basophil # : 0.01 K/uL  Auto Neutrophil % : 83.9 %  Auto Lymphocyte % : 0.0 %  Auto Monocyte % : 3.2 %  Auto Eosinophil % : 11.3 %  Auto Basophil % : 1.6 %                          7.4    0.82  )-----------( 94       ( 2023 07:13 )             21.6         137  |  106  |  10  ----------------------------<  96  4.1   |  22  |  0.70    Ca    8.4      2023 07:10  Phos  2.7       Mg     2.0         TPro  6.5  /  Alb  3.9  /  TBili  1.0  /  DBili  x   /  AST  18  /  ALT  34  /  AlkPhos  84        LIVER FUNCTIONS - ( 2023 07:10 )  Alb: 3.9 g/dL / Pro: 6.5 g/dL / ALK PHOS: 84 U/L / ALT: 34 U/L / AST: 18 U/L / GGT: x           Lactate Dehydrogenase, Serum: 278 U/L ( @ 07:10)        Cultures:   C.DIFF () - NEG    Blood cx () - pend  Ucx - p    Radiology:   CXR () - clear     Meds:   Antimicrobials:   acyclovir   Oral Tab/Cap 400 milliGRAM(s) Oral every 8 hours  cefepime   IVPB      cefepime   IVPB 2000 milliGRAM(s) IV Intermittent every 8 hours  clotrimazole Lozenge 1 Lozenge Oral five times a day  fluconAZOLE   Tablet 400 milliGRAM(s) Oral daily      Heme / Onc:   cyclophosphamide IVPB (eMAR) 3860 milliGRAM(s) IV Intermittent daily  heparin  Infusion 424 Unit(s)/Hr IV Continuous <Continuous>  mesna IVPB (eMAR) 1200 milliGRAM(s) IV Intermittent every 3 hours      GI:  famotidine    Tablet 20 milliGRAM(s) Oral two times a day  senna 1 Tablet(s) Oral at bedtime PRN  sodium bicarbonate Mouth Rinse 10 milliLiter(s) Swish and Spit five times a day  ursodiol Capsule 300 milliGRAM(s) Oral two times a day      Cardiovascular:   furosemide   Injectable (Chemo) 20 milliGRAM(s) IV Push once      Immunologic:       Other medications:   acetaminophen     Tablet .. 650 milliGRAM(s) Oral every 6 hours  aprepitant (Chemo) 80 milliGRAM(s) Oral every 24 hours  Biotene Dry Mouth Oral Rinse 5 milliLiter(s) Swish and Spit five times a day  Chemotherapy Worklist Order      chlorhexidine 4% Liquid 1 Application(s) Topical <User Schedule>  folic acid 1 milliGRAM(s) Oral daily  Infuse HPC Product      multivitamin 1 Tablet(s) Oral daily  ondansetron Injectable (Chemo) 8 milliGRAM(s) IV Push every 8 hours  sodium bicarbonate  Infusion (Chemo) 0.074 mEq/kG/Hr IV Continuous <Continuous>  sodium chloride 0.9% 1000 milliLiter(s) IV Continuous <Continuous>  sodium chloride 0.9%. 1000 milliLiter(s) IV Continuous <Continuous>  sodium chloride 0.9%. (Chemo) 1000 milliLiter(s) IV Continuous <Continuous>      PRN:   acetaminophen     Tablet .. 650 milliGRAM(s) Oral every 6 hours PRN  diphenhydrAMINE Injectable 25 milliGRAM(s) IV Push every 12 hours PRN  prochlorperazine   Injectable 10 milliGRAM(s) IV Push every 6 hours PRN  senna 1 Tablet(s) Oral at bedtime PRN  sodium chloride 0.9% lock flush 10 milliLiter(s) IV Push every 1 hour PRN      A/P:  37 year old male with severe aplastic anemia with multiple lines of treatment admitted for a haplo-identical bone marrow transplant with  Flu / Cy / TBI preparative regimen   Day + 3  start CTX hydration - 1/2NS @ 200ml /hr    Strict I&O, daily weights, prn diuresis   Day -6 - day -2 - Fludarabine 30mg/m2 IV  Day -6 - day - 5 - CTX 14.5mg/kg/day IV. CTX to start after urine SG < 1.010. Mesna  12mg / kg - hemorrhagic cystitis ppx   Day -2 - IVIG 0.4 g/kg (ideal body weight) every 3 weeks. Premedication with Tylenol and benadryl   Day -1 -  cGy x 1 dose   Day - 1 - at 2200 begin transplant hydration : 0.45Ns + 1 amp (50mEq) sodium bicarbonate at 150ml /hr; continue transplant hydration for 24 hours post infusion    Day + 2 at 0 - begin CTX hydration (0.45NS + 10 mEq KCl/ @150ml /m2  continue 24 hours post last dose of CTX   Day + 3 - + 4 - CTX 50mg/kg/day IV. Begin CTX when urine SG < 1.010. EKG daily. Mesna for hemorraghic cystitis ppx.   Day + 5 - start Zarxio,  MMF and Tacrolimus. Check Tacrolimus levels on Monday and Thursday.   when ANC < 500, start Cipro 500mg po BID. If becomes febrile, pan cx, CXR and change Cipro to Cefepime 2g IV q 8 hours. Continue until count recovery  - Fludarabine , intermittent nausea, continue antiemetics   - Fludarabine , IVIG today    TBI today  - HPC transplant today. Continue transplant hydration for 24 hours post infusion of bone marrow   - monitor for CRS / haplo storm - if T >/= 102.5 on days +1 or +2, would dose tocilizumab 8mg / kg IVPB x 1    C.Diff negative, started imodium    1. Infectious Disease:   acyclovir   Oral Tab/Cap 400 milliGRAM(s) Oral every 8 hours  cefepime   IVPB      cefepime   IVPB 2000 milliGRAM(s) IV Intermittent every 8 hours  clotrimazole Lozenge 1 Lozenge Oral five times a day  fluconAZOLE   Tablet 400 milliGRAM(s) Oral daily    2. VOD Prophylaxis: Actigall, Glutamine, Heparin (dosed at 100 units / kg / day)     3. GI Prophylaxis:   famotidine    Tablet 20 milliGRAM(s) Oral two times a day    4. Mouthcare - NS / NaHCO3 rinses, Mycelex, Biotene; Skin care     5. GVHD prophylaxis   TBI day -1   CTX days +3, +4  MMF, tacrolimus to start on day +5     6. Transfuse & replete electrolytes prn     7. IV hydration, daily weights, strict I&O, prn diuresis     8. PO intake as tolerated, nutrition follow up as needed, MVI, folic acid     9. Antiemetics, anti-diarrhea medications:   LORazepam   Injectable 1 milliGRAM(s) IV Push every 6 hours PRN  prochlorperazine   Injectable 10 milliGRAM(s) IV Push every 6 hours PRN  ondansetron Injectable (Chemo) 8 milliGRAM(s) IV Push every 8 hours  aprepitant (Chemo) 80 milliGRAM(s) Oral every 24 hours    10. OOB as tolerated, physical therapy consult if needed     11. Monitor coags / fibrinogen 2x week, vitamin K as needed     12. Monitor closely for clinical changes, monitor for fevers     13. Emotional support provided, plan of care discussed and questions addressed     14. Patient education done regarding chemotherapy prep, plan of care, restrictions and discharge planning     15. Continue regular social work input     I have written the above note for Dr. Wilder who performed service with me in the room.   Axel Goncalves PA-C ([phone])    I have seen and examined patient with PA, I agree with above note as scribed.

## 2023-06-12 LAB
ALBUMIN SERPL ELPH-MCNC: 3.5 G/DL — SIGNIFICANT CHANGE UP (ref 3.3–5)
ALBUMIN SERPL ELPH-MCNC: 3.9 G/DL — SIGNIFICANT CHANGE UP (ref 3.3–5)
ALP SERPL-CCNC: 77 U/L — SIGNIFICANT CHANGE UP (ref 40–120)
ALP SERPL-CCNC: 77 U/L — SIGNIFICANT CHANGE UP (ref 40–120)
ALT FLD-CCNC: 29 U/L — SIGNIFICANT CHANGE UP (ref 10–45)
ALT FLD-CCNC: 32 U/L — SIGNIFICANT CHANGE UP (ref 10–45)
ANION GAP SERPL CALC-SCNC: 10 MMOL/L — SIGNIFICANT CHANGE UP (ref 5–17)
ANION GAP SERPL CALC-SCNC: 11 MMOL/L — SIGNIFICANT CHANGE UP (ref 5–17)
APTT BLD: 28.4 SEC — SIGNIFICANT CHANGE UP (ref 27.5–35.5)
AST SERPL-CCNC: 20 U/L — SIGNIFICANT CHANGE UP (ref 10–40)
AST SERPL-CCNC: 23 U/L — SIGNIFICANT CHANGE UP (ref 10–40)
BILIRUB DIRECT SERPL-MCNC: 0.2 MG/DL — SIGNIFICANT CHANGE UP (ref 0–0.3)
BILIRUB INDIRECT FLD-MCNC: 0.8 MG/DL — SIGNIFICANT CHANGE UP (ref 0.2–1)
BILIRUB SERPL-MCNC: 0.9 MG/DL — SIGNIFICANT CHANGE UP (ref 0.2–1.2)
BILIRUB SERPL-MCNC: 1 MG/DL — SIGNIFICANT CHANGE UP (ref 0.2–1.2)
BLD GP AB SCN SERPL QL: NEGATIVE — SIGNIFICANT CHANGE UP
BUN SERPL-MCNC: 12 MG/DL — SIGNIFICANT CHANGE UP (ref 7–23)
BUN SERPL-MCNC: 9 MG/DL — SIGNIFICANT CHANGE UP (ref 7–23)
CALCIUM SERPL-MCNC: 8.2 MG/DL — LOW (ref 8.4–10.5)
CALCIUM SERPL-MCNC: 8.4 MG/DL — SIGNIFICANT CHANGE UP (ref 8.4–10.5)
CHLORIDE SERPL-SCNC: 104 MMOL/L — SIGNIFICANT CHANGE UP (ref 96–108)
CHLORIDE SERPL-SCNC: 108 MMOL/L — SIGNIFICANT CHANGE UP (ref 96–108)
CO2 SERPL-SCNC: 21 MMOL/L — LOW (ref 22–31)
CO2 SERPL-SCNC: 22 MMOL/L — SIGNIFICANT CHANGE UP (ref 22–31)
CREAT SERPL-MCNC: 0.78 MG/DL — SIGNIFICANT CHANGE UP (ref 0.5–1.3)
CREAT SERPL-MCNC: 0.78 MG/DL — SIGNIFICANT CHANGE UP (ref 0.5–1.3)
CULTURE RESULTS: NO GROWTH — SIGNIFICANT CHANGE UP
EGFR: 119 ML/MIN/1.73M2 — SIGNIFICANT CHANGE UP
EGFR: 119 ML/MIN/1.73M2 — SIGNIFICANT CHANGE UP
FIBRINOGEN PPP-MCNC: 490 MG/DL — HIGH (ref 200–445)
GLUCOSE SERPL-MCNC: 125 MG/DL — HIGH (ref 70–99)
GLUCOSE SERPL-MCNC: 96 MG/DL — SIGNIFICANT CHANGE UP (ref 70–99)
HCT VFR BLD CALC: 19.8 % — CRITICAL LOW (ref 39–50)
HCT VFR BLD CALC: 22.4 % — LOW (ref 39–50)
HGB BLD-MCNC: 6.6 G/DL — CRITICAL LOW (ref 13–17)
HGB BLD-MCNC: 7.8 G/DL — LOW (ref 13–17)
INR BLD: 1.24 RATIO — HIGH (ref 0.88–1.16)
LDH SERPL L TO P-CCNC: 332 U/L — HIGH (ref 50–242)
LDH SERPL L TO P-CCNC: 396 U/L — HIGH (ref 50–242)
MAGNESIUM SERPL-MCNC: 2 MG/DL — SIGNIFICANT CHANGE UP (ref 1.6–2.6)
MAGNESIUM SERPL-MCNC: 2.1 MG/DL — SIGNIFICANT CHANGE UP (ref 1.6–2.6)
MCHC RBC-ENTMCNC: 31.6 PG — SIGNIFICANT CHANGE UP (ref 27–34)
MCHC RBC-ENTMCNC: 32.1 PG — SIGNIFICANT CHANGE UP (ref 27–34)
MCHC RBC-ENTMCNC: 33.3 GM/DL — SIGNIFICANT CHANGE UP (ref 32–36)
MCHC RBC-ENTMCNC: 34.8 GM/DL — SIGNIFICANT CHANGE UP (ref 32–36)
MCV RBC AUTO: 92.2 FL — SIGNIFICANT CHANGE UP (ref 80–100)
MCV RBC AUTO: 94.7 FL — SIGNIFICANT CHANGE UP (ref 80–100)
NRBC # BLD: 0 /100 WBCS — SIGNIFICANT CHANGE UP (ref 0–0)
NRBC # BLD: 0 /100 WBCS — SIGNIFICANT CHANGE UP (ref 0–0)
PHOSPHATE SERPL-MCNC: 2.3 MG/DL — LOW (ref 2.5–4.5)
PHOSPHATE SERPL-MCNC: 3.1 MG/DL — SIGNIFICANT CHANGE UP (ref 2.5–4.5)
PLATELET # BLD AUTO: 52 K/UL — LOW (ref 150–400)
PLATELET # BLD AUTO: 59 K/UL — LOW (ref 150–400)
POTASSIUM SERPL-MCNC: 3.8 MMOL/L — SIGNIFICANT CHANGE UP (ref 3.5–5.3)
POTASSIUM SERPL-MCNC: 4.2 MMOL/L — SIGNIFICANT CHANGE UP (ref 3.5–5.3)
POTASSIUM SERPL-SCNC: 3.8 MMOL/L — SIGNIFICANT CHANGE UP (ref 3.5–5.3)
POTASSIUM SERPL-SCNC: 4.2 MMOL/L — SIGNIFICANT CHANGE UP (ref 3.5–5.3)
PROT SERPL-MCNC: 6.2 G/DL — SIGNIFICANT CHANGE UP (ref 6–8.3)
PROT SERPL-MCNC: 6.7 G/DL — SIGNIFICANT CHANGE UP (ref 6–8.3)
PROTHROM AB SERPL-ACNC: 14.4 SEC — HIGH (ref 10.5–13.4)
RBC # BLD: 2.09 M/UL — LOW (ref 4.2–5.8)
RBC # BLD: 2.43 M/UL — LOW (ref 4.2–5.8)
RBC # FLD: 15.1 % — HIGH (ref 10.3–14.5)
RBC # FLD: 15.8 % — HIGH (ref 10.3–14.5)
RH IG SCN BLD-IMP: POSITIVE — SIGNIFICANT CHANGE UP
SODIUM SERPL-SCNC: 137 MMOL/L — SIGNIFICANT CHANGE UP (ref 135–145)
SODIUM SERPL-SCNC: 139 MMOL/L — SIGNIFICANT CHANGE UP (ref 135–145)
SP GR UR STRIP: 1.01 — LOW (ref 1.01–1.02)
SPECIMEN SOURCE: SIGNIFICANT CHANGE UP
WBC # BLD: 0.19 K/UL — CRITICAL LOW (ref 3.8–10.5)
WBC # BLD: 0.26 K/UL — CRITICAL LOW (ref 3.8–10.5)
WBC # FLD AUTO: 0.19 K/UL — CRITICAL LOW (ref 3.8–10.5)
WBC # FLD AUTO: 0.26 K/UL — CRITICAL LOW (ref 3.8–10.5)

## 2023-06-12 PROCEDURE — 99291 CRITICAL CARE FIRST HOUR: CPT

## 2023-06-12 PROCEDURE — 93010 ELECTROCARDIOGRAM REPORT: CPT

## 2023-06-12 RX ORDER — FUROSEMIDE 40 MG
40 TABLET ORAL ONCE
Refills: 0 | Status: COMPLETED | OUTPATIENT
Start: 2023-06-12 | End: 2023-06-12

## 2023-06-12 RX ORDER — ACETAMINOPHEN 500 MG
1000 TABLET ORAL ONCE
Refills: 0 | Status: COMPLETED | OUTPATIENT
Start: 2023-06-12 | End: 2023-06-12

## 2023-06-12 RX ORDER — DIPHENHYDRAMINE HCL 50 MG
50 CAPSULE ORAL ONCE
Refills: 0 | Status: COMPLETED | OUTPATIENT
Start: 2023-06-12 | End: 2023-06-12

## 2023-06-12 RX ORDER — POTASSIUM CHLORIDE 20 MEQ
20 PACKET (EA) ORAL
Refills: 0 | Status: COMPLETED | OUTPATIENT
Start: 2023-06-12 | End: 2023-06-12

## 2023-06-12 RX ORDER — DIPHENHYDRAMINE HCL 50 MG
25 CAPSULE ORAL EVERY 12 HOURS
Refills: 0 | Status: DISCONTINUED | OUTPATIENT
Start: 2023-06-12 | End: 2023-06-21

## 2023-06-12 RX ORDER — SODIUM,POTASSIUM PHOSPHATES 278-250MG
1 POWDER IN PACKET (EA) ORAL ONCE
Refills: 0 | Status: DISCONTINUED | OUTPATIENT
Start: 2023-06-12 | End: 2023-06-12

## 2023-06-12 RX ORDER — TOCILIZUMAB 20 MG/ML
800 INJECTION, SOLUTION, CONCENTRATE INTRAVENOUS ONCE
Refills: 0 | Status: COMPLETED | OUTPATIENT
Start: 2023-06-12 | End: 2023-06-12

## 2023-06-12 RX ORDER — POTASSIUM PHOSPHATE, MONOBASIC POTASSIUM PHOSPHATE, DIBASIC 236; 224 MG/ML; MG/ML
30 INJECTION, SOLUTION INTRAVENOUS ONCE
Refills: 0 | Status: COMPLETED | OUTPATIENT
Start: 2023-06-12 | End: 2023-06-12

## 2023-06-12 RX ADMIN — Medication 1 LOZENGE: at 09:06

## 2023-06-12 RX ADMIN — Medication 1 LOZENGE: at 23:15

## 2023-06-12 RX ADMIN — ONDANSETRON 8 MILLIGRAM(S): 8 TABLET, FILM COATED ORAL at 06:48

## 2023-06-12 RX ADMIN — Medication 3860 MILLIGRAM(S): at 13:48

## 2023-06-12 RX ADMIN — Medication 5 MILLILITER(S): at 19:06

## 2023-06-12 RX ADMIN — Medication 400 MILLIGRAM(S): at 06:48

## 2023-06-12 RX ADMIN — Medication 5 MILLILITER(S): at 09:05

## 2023-06-12 RX ADMIN — Medication 1 LOZENGE: at 16:26

## 2023-06-12 RX ADMIN — CEFEPIME 100 MILLIGRAM(S): 1 INJECTION, POWDER, FOR SOLUTION INTRAMUSCULAR; INTRAVENOUS at 20:59

## 2023-06-12 RX ADMIN — CEFEPIME 100 MILLIGRAM(S): 1 INJECTION, POWDER, FOR SOLUTION INTRAMUSCULAR; INTRAVENOUS at 14:06

## 2023-06-12 RX ADMIN — Medication 50 MILLIEQUIVALENT(S): at 16:25

## 2023-06-12 RX ADMIN — TOCILIZUMAB 100 MILLIGRAM(S): 20 INJECTION, SOLUTION, CONCENTRATE INTRAVENOUS at 03:35

## 2023-06-12 RX ADMIN — CEFEPIME 100 MILLIGRAM(S): 1 INJECTION, POWDER, FOR SOLUTION INTRAMUSCULAR; INTRAVENOUS at 06:50

## 2023-06-12 RX ADMIN — Medication 400 MILLIGRAM(S): at 14:06

## 2023-06-12 RX ADMIN — URSODIOL 300 MILLIGRAM(S): 250 TABLET, FILM COATED ORAL at 17:48

## 2023-06-12 RX ADMIN — MESNA 1200 MILLIGRAM(S): 100 INJECTION, SOLUTION INTRAVENOUS at 22:00

## 2023-06-12 RX ADMIN — Medication 650 MILLIGRAM(S): at 09:41

## 2023-06-12 RX ADMIN — ONDANSETRON 8 MILLIGRAM(S): 8 TABLET, FILM COATED ORAL at 20:59

## 2023-06-12 RX ADMIN — CHLORHEXIDINE GLUCONATE 1 APPLICATION(S): 213 SOLUTION TOPICAL at 06:49

## 2023-06-12 RX ADMIN — FLUCONAZOLE 400 MILLIGRAM(S): 150 TABLET ORAL at 12:50

## 2023-06-12 RX ADMIN — Medication 1 LOZENGE: at 03:19

## 2023-06-12 RX ADMIN — Medication 400 MILLIGRAM(S): at 20:58

## 2023-06-12 RX ADMIN — MESNA 1200 MILLIGRAM(S): 100 INJECTION, SOLUTION INTRAVENOUS at 19:07

## 2023-06-12 RX ADMIN — Medication 10 MILLILITER(S): at 12:46

## 2023-06-12 RX ADMIN — Medication 5 MILLILITER(S): at 23:15

## 2023-06-12 RX ADMIN — Medication 1 LOZENGE: at 12:47

## 2023-06-12 RX ADMIN — SODIUM CHLORIDE 20 MILLILITER(S): 9 INJECTION INTRAMUSCULAR; INTRAVENOUS; SUBCUTANEOUS at 19:07

## 2023-06-12 RX ADMIN — Medication 650 MILLIGRAM(S): at 10:15

## 2023-06-12 RX ADMIN — Medication 10 MILLIGRAM(S): at 15:45

## 2023-06-12 RX ADMIN — HEPARIN SODIUM 4.24 UNIT(S)/HR: 5000 INJECTION INTRAVENOUS; SUBCUTANEOUS at 19:07

## 2023-06-12 RX ADMIN — Medication 1 MILLIGRAM(S): at 12:50

## 2023-06-12 RX ADMIN — Medication 5 MILLILITER(S): at 12:47

## 2023-06-12 RX ADMIN — Medication 1 LOZENGE: at 19:07

## 2023-06-12 RX ADMIN — APREPITANT 80 MILLIGRAM(S): 80 CAPSULE ORAL at 12:51

## 2023-06-12 RX ADMIN — Medication 10 MILLILITER(S): at 16:26

## 2023-06-12 RX ADMIN — Medication 1 TABLET(S): at 12:50

## 2023-06-12 RX ADMIN — MESNA 1200 MILLIGRAM(S): 100 INJECTION, SOLUTION INTRAVENOUS at 13:15

## 2023-06-12 RX ADMIN — MESNA 1200 MILLIGRAM(S): 100 INJECTION, SOLUTION INTRAVENOUS at 16:25

## 2023-06-12 RX ADMIN — FAMOTIDINE 20 MILLIGRAM(S): 10 INJECTION INTRAVENOUS at 06:48

## 2023-06-12 RX ADMIN — FAMOTIDINE 20 MILLIGRAM(S): 10 INJECTION INTRAVENOUS at 17:48

## 2023-06-12 RX ADMIN — Medication 5 MILLILITER(S): at 16:26

## 2023-06-12 RX ADMIN — Medication 10 MILLILITER(S): at 19:06

## 2023-06-12 RX ADMIN — Medication 50 MILLIEQUIVALENT(S): at 12:05

## 2023-06-12 RX ADMIN — Medication 50 MILLIEQUIVALENT(S): at 10:19

## 2023-06-12 RX ADMIN — ONDANSETRON 8 MILLIGRAM(S): 8 TABLET, FILM COATED ORAL at 14:06

## 2023-06-12 RX ADMIN — Medication 50 MILLIGRAM(S): at 03:34

## 2023-06-12 RX ADMIN — POTASSIUM PHOSPHATE, MONOBASIC POTASSIUM PHOSPHATE, DIBASIC 83.33 MILLIMOLE(S): 236; 224 INJECTION, SOLUTION INTRAVENOUS at 23:24

## 2023-06-12 RX ADMIN — Medication 40 MILLIGRAM(S): at 16:25

## 2023-06-12 RX ADMIN — Medication 1000 MILLIGRAM(S): at 03:43

## 2023-06-12 RX ADMIN — URSODIOL 300 MILLIGRAM(S): 250 TABLET, FILM COATED ORAL at 06:48

## 2023-06-12 RX ADMIN — Medication 10 MILLILITER(S): at 23:15

## 2023-06-12 RX ADMIN — Medication 400 MILLIGRAM(S): at 03:34

## 2023-06-12 RX ADMIN — Medication 20 MILLIGRAM(S): at 18:17

## 2023-06-12 RX ADMIN — SODIUM CHLORIDE 288 MILLILITER(S): 9 INJECTION, SOLUTION INTRAVENOUS at 19:29

## 2023-06-12 RX ADMIN — Medication 40 MILLIGRAM(S): at 10:18

## 2023-06-12 RX ADMIN — Medication 5 MILLILITER(S): at 03:18

## 2023-06-12 RX ADMIN — Medication 10 MILLILITER(S): at 09:05

## 2023-06-12 RX ADMIN — Medication 10 MILLILITER(S): at 03:18

## 2023-06-12 NOTE — PROGRESS NOTE ADULT - SUBJECTIVE AND OBJECTIVE BOX
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 () with Flu / Cy / TBI prep regimen for treatment of severe aplastic anemia    S: Patient seen and examined with HPC Transplant Team:   +Febrile  +Diarrhea x5, very loose, watery  +fatigue    O: Rest of ROS negative      O: Vitals:   Vital Signs Last 24 Hrs  T(C): 37.1 (2023 06:00), Max: 39.1 (2023 02:57)  T(F): 98.8 (2023 06:00), Max: 102.4 (2023 02:57)  HR: 100 (2023 06:00) (100 - 118)  BP: 103/60 (2023 06:00) (103/60 - 140/84)  BP(mean): --  RR: 20 (2023 06:00) (18 - 20)  SpO2: 98% (2023 06:00) (98% - 100%)    Parameters below as of 2023 16:20  Patient On (Oxygen Delivery Method): room air      Admit weight: 102.8kg  Daily Weight in k (2023 08:55)    Intake / Output:   -11 @ 07:01  -  06-12 @ 07:00  --------------------------------------------------------  IN: 8088.6 mL / OUT: 8050 mL / NET: 38.6 mL          PE:   Oropharynx: Clear  Oral Mucositis: (-)                                                        Grade:   CVS: RRR, +S1,S2  Lungs: CTA B/L  Abdomen: Soft, NT, ND, +BS  Extremities: No edema in BLE  Gastric Mucositis: (-)                                                 Grade:   Intestinal Mucositis:   (-)                                           Grade:   Skin: Intact  TLC: CDI  Neuro: Alert, Ox3  Pain: Denies        Labs:         Cultures:         Radiology:       Meds:   Antimicrobials:   acyclovir   Oral Tab/Cap 400 milliGRAM(s) Oral every 8 hours  cefepime   IVPB      cefepime   IVPB 2000 milliGRAM(s) IV Intermittent every 8 hours  clotrimazole Lozenge 1 Lozenge Oral five times a day  fluconAZOLE   Tablet 400 milliGRAM(s) Oral daily      Heme / Onc:   cyclophosphamide IVPB (eMAR) 3860 milliGRAM(s) IV Intermittent daily  heparin  Infusion 424 Unit(s)/Hr IV Continuous <Continuous>  mesna IVPB (eMAR) 1200 milliGRAM(s) IV Intermittent every 3 hours      GI:  famotidine    Tablet 20 milliGRAM(s) Oral two times a day  loperamide 2 milliGRAM(s) Oral every 4 hours PRN  senna 1 Tablet(s) Oral at bedtime PRN  sodium bicarbonate Mouth Rinse 10 milliLiter(s) Swish and Spit five times a day  ursodiol Capsule 300 milliGRAM(s) Oral two times a day      Cardiovascular:   furosemide   Injectable (Chemo) 20 milliGRAM(s) IV Push once    Other medications:   acetaminophen     Tablet .. 650 milliGRAM(s) Oral every 6 hours  aprepitant (Chemo) 80 milliGRAM(s) Oral every 24 hours  Biotene Dry Mouth Oral Rinse 5 milliLiter(s) Swish and Spit five times a day  Chemotherapy Worklist Order      chlorhexidine 4% Liquid 1 Application(s) Topical <User Schedule>  folic acid 1 milliGRAM(s) Oral daily  Infuse HPC Product      multivitamin 1 Tablet(s) Oral daily  ondansetron Injectable (Chemo) 8 milliGRAM(s) IV Push every 8 hours  sodium bicarbonate  Infusion (Chemo) 0.074 mEq/kG/Hr IV Continuous <Continuous>  sodium chloride 0.9% 1000 milliLiter(s) IV Continuous <Continuous>  sodium chloride 0.9%. 1000 milliLiter(s) IV Continuous <Continuous>  sodium chloride 0.9%. (Chemo) 1000 milliLiter(s) IV Continuous <Continuous>      PRN:   acetaminophen     Tablet .. 650 milliGRAM(s) Oral every 6 hours PRN  diphenhydrAMINE Injectable 25 milliGRAM(s) IV Push every 12 hours PRN  loperamide 2 milliGRAM(s) Oral every 4 hours PRN  prochlorperazine   Injectable 10 milliGRAM(s) IV Push every 6 hours PRN  senna 1 Tablet(s) Oral at bedtime PRN  sodium chloride 0.9% lock flush 10 milliLiter(s) IV Push every 1 hour PRN      A/P:    37 year old male with severe aplastic anemia with multiple lines of treatment admitted for a haplo-identical bone marrow transplant with  Flu / Cy / TBI preparative regimen   Day + 4  start CTX hydration - 1/2NS @ 200ml /hr    Strict I&O, daily weights, prn diuresis   Day -6 - day -2 - Fludarabine 30mg/m2 IV  Day -6 - day - 5 - CTX 14.5mg/kg/day IV. CTX to start after urine SG < 1.010. Mesna  12mg / kg - hemorrhagic cystitis ppx   Day -2 - IVIG 0.4 g/kg (ideal body weight) every 3 weeks. Premedication with Tylenol and benadryl   Day -1 -  cGy x 1 dose   Day - 1 - at 2200 begin transplant hydration : 0.45Ns + 1 amp (50mEq) sodium bicarbonate at 150ml /hr; continue transplant hydration for 24 hours post infusion    Day + 2 at 2200 - begin CTX hydration (0.45NS + 10 mEq KCl/ @150ml /m2  continue 24 hours post last dose of CTX   Day + 3 - + 4 - CTX 50mg/kg/day IV. Begin CTX when urine SG < 1.010. EKG daily. Mesna for hemorraghic cystitis ppx.   Day + 5 - start Zarxio,  MMF and Tacrolimus. Check Tacrolimus levels on Monday and Thursday.   when ANC < 500, start Cipro 500mg po BID. If becomes febrile, pan cx, CXR and change Cipro to Cefepime 2g IV q 8 hours. Continue until count recovery  - Fludarabine , intermittent nausea, continue antiemetics   - Fludarabine , IVIG today    TBI today  - HPC transplant today. Continue transplant hydration for 24 hours post infusion of bone marrow   - monitor for CRS / haplo storm - if T >/= 102.5 on days +1 or +2, would dose tocilizumab 8mg / kg IVPB x 1    C.Diff negative, started imodium    1. Infectious Disease:   acyclovir   Oral Tab/Cap 400 milliGRAM(s) Oral every 8 hours  cefepime   IVPB      cefepime   IVPB 2000 milliGRAM(s) IV Intermittent every 8 hours  clotrimazole Lozenge 1 Lozenge Oral five times a day  fluconAZOLE   Tablet 400 milliGRAM(s) Oral daily    2. VOD Prophylaxis: Actigall, Glutamine, Heparin (dosed at 100 units / kg / day)     3. GI Prophylaxis:   famotidine    Tablet 20 milliGRAM(s) Oral two times a day    4. Mouthcare - NS / NaHCO3 rinses, Mycelex, Biotene; Skin care     5. GVHD prophylaxis   TBI day -1   CTX days +3, +4  MMF, tacrolimus to start on day +5     6. Transfuse & replete electrolytes prn     7. IV hydration, daily weights, strict I&O, prn diuresis     8. PO intake as tolerated, nutrition follow up as needed, MVI, folic acid     9. Antiemetics, anti-diarrhea medications:   LORazepam   Injectable 1 milliGRAM(s) IV Push every 6 hours PRN  prochlorperazine   Injectable 10 milliGRAM(s) IV Push every 6 hours PRN  ondansetron Injectable (Chemo) 8 milliGRAM(s) IV Push every 8 hours  aprepitant (Chemo) 80 milliGRAM(s) Oral every 24 hours    10. OOB as tolerated, physical therapy consult if needed     11. Monitor coags / fibrinogen 2x week, vitamin K as needed     12. Monitor closely for clinical changes, monitor for fevers     13. Emotional support provided, plan of care discussed and questions addressed     14. Patient education done regarding chemotherapy prep, plan of care, restrictions and discharge planning     15. Continue regular social work input       I have written the above note for Dr. Wilder who performed service with me in the room.   Buck Sosa  NP-C ([phone])    I have seen and examined patient with NP, I agree with above note as scribed.                    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 () with Flu / Cy / TBI prep regimen for treatment of severe aplastic anemia    S: Patient seen and examined with HPC Transplant Team:   +Febrile  +Diarrhea x5, very loose, watery  +fatigue    O: Rest of ROS negative      O: Vitals:   Vital Signs Last 24 Hrs  T(C): 37.1 (2023 06:00), Max: 39.1 (2023 02:57)  T(F): 98.8 (2023 06:00), Max: 102.4 (2023 02:57)  HR: 100 (2023 06:00) (100 - 118)  BP: 103/60 (2023 06:00) (103/60 - 140/84)  BP(mean): --  RR: 20 (2023 06:00) (18 - 20)  SpO2: 98% (2023 06:00) (98% - 100%)    Parameters below as of 2023 16:20  Patient On (Oxygen Delivery Method): room air      Admit weight: 102.8kg  Daily Weight in k (2023 08:55)    Intake / Output:   -11 @ 07:01  -  06-12 @ 07:00  --------------------------------------------------------  IN: 8088.6 mL / OUT: 8050 mL / NET: 38.6 mL          PE:   Oropharynx: Clear  Oral Mucositis: (-)                                                        Grade:   CVS: RRR, +S1,S2  Lungs: CTA B/L  Abdomen: Soft, NT, ND, +BS  Extremities: No edema in BLE  Gastric Mucositis: (-)                                                 Grade:   Intestinal Mucositis:   (-)                                           Grade:   Skin: Intact  TLC: CDI  Neuro: Alert, Ox3  Pain: Denies        Labs:                         6.6    0.26  )-----------( 59       ( 2023 07:09 )             19.8     06-12    139  |  108  |  9   ----------------------------<  96  4.2   |  21<L>  |  0.78    Ca    8.2<L>      2023 07:06  Phos  3.1       Mg     2.0         TPro  6.2  /  Alb  3.5  /  TBili  1.0  /  DBili  0.2  /  AST  20  /  ALT  29  /  AlkPhos  77      Cultures:   Culture Results:   No growth to date. (06.10.23 @ 21:45)    Culture Results:   No growth to date. (06.10.23 @ 21:30)    Culture Results:   No growth to date. (06.10.23 @ 07:14)    Radiology:   ACC: [id number] EXAM:  XR CHEST PORTABLE URGENT 1V   ORDERED BY:  JACQUE ONEIL   PROCEDURE DATE:  06/10/2023    Impression:  Clear lungs.      Meds:   Antimicrobials:   acyclovir   Oral Tab/Cap 400 milliGRAM(s) Oral every 8 hours  cefepime   IVPB      cefepime   IVPB 2000 milliGRAM(s) IV Intermittent every 8 hours  clotrimazole Lozenge 1 Lozenge Oral five times a day  fluconAZOLE   Tablet 400 milliGRAM(s) Oral daily      Heme / Onc:   cyclophosphamide IVPB (eMAR) 3860 milliGRAM(s) IV Intermittent daily  heparin  Infusion 424 Unit(s)/Hr IV Continuous <Continuous>  mesna IVPB (eMAR) 1200 milliGRAM(s) IV Intermittent every 3 hours      GI:  famotidine    Tablet 20 milliGRAM(s) Oral two times a day  loperamide 2 milliGRAM(s) Oral every 4 hours PRN  senna 1 Tablet(s) Oral at bedtime PRN  sodium bicarbonate Mouth Rinse 10 milliLiter(s) Swish and Spit five times a day  ursodiol Capsule 300 milliGRAM(s) Oral two times a day      Cardiovascular:   furosemide   Injectable (Chemo) 20 milliGRAM(s) IV Push once    Other medications:   acetaminophen     Tablet .. 650 milliGRAM(s) Oral every 6 hours  aprepitant (Chemo) 80 milliGRAM(s) Oral every 24 hours  Biotene Dry Mouth Oral Rinse 5 milliLiter(s) Swish and Spit five times a day  Chemotherapy Worklist Order      chlorhexidine 4% Liquid 1 Application(s) Topical <User Schedule>  folic acid 1 milliGRAM(s) Oral daily  Infuse HPC Product      multivitamin 1 Tablet(s) Oral daily  ondansetron Injectable (Chemo) 8 milliGRAM(s) IV Push every 8 hours  sodium bicarbonate  Infusion (Chemo) 0.074 mEq/kG/Hr IV Continuous <Continuous>  sodium chloride 0.9% 1000 milliLiter(s) IV Continuous <Continuous>  sodium chloride 0.9%. 1000 milliLiter(s) IV Continuous <Continuous>  sodium chloride 0.9%. (Chemo) 1000 milliLiter(s) IV Continuous <Continuous>      PRN:   acetaminophen     Tablet .. 650 milliGRAM(s) Oral every 6 hours PRN  diphenhydrAMINE Injectable 25 milliGRAM(s) IV Push every 12 hours PRN  loperamide 2 milliGRAM(s) Oral every 4 hours PRN  prochlorperazine   Injectable 10 milliGRAM(s) IV Push every 6 hours PRN  senna 1 Tablet(s) Oral at bedtime PRN  sodium chloride 0.9% lock flush 10 milliLiter(s) IV Push every 1 hour PRN      A/P:    37 year old male with severe aplastic anemia with multiple lines of treatment admitted for a haplo-identical bone marrow transplant with  Flu / Cy / TBI preparative regimen   Day + 4  start CTX hydration - 1/2NS @ 200ml /hr    Strict I&O, daily weights, prn diuresis   Day -6 - day -2 - Fludarabine 30mg/m2 IV  Day -6 - day - 5 - CTX 14.5mg/kg/day IV. CTX to start after urine SG < 1.010. Mesna  12mg / kg - hemorrhagic cystitis ppx   Day -2 - IVIG 0.4 g/kg (ideal body weight) every 3 weeks. Premedication with Tylenol and benadryl   Day -1 -  cGy x 1 dose   Day - 1 - at 2200 begin transplant hydration : 0.45Ns + 1 amp (50mEq) sodium bicarbonate at 150ml /hr; continue transplant hydration for 24 hours post infusion    Day + 2 at 2200 - begin CTX hydration (0.45NS + 10 mEq KCl/ @150ml /m2  continue 24 hours post last dose of CTX   Day + 3 - + 4 - CTX 50mg/kg/day IV. Begin CTX when urine SG < 1.010. EKG daily. Mesna for hemorraghic cystitis ppx.   Day + 5 - start Zarxio,  MMF and Tacrolimus. Check Tacrolimus levels on Monday and Thursday.   when ANC < 500, start Cipro 500mg po BID. If becomes febrile, pan cx, CXR and change Cipro to Cefepime 2g IV q 8 hours. Continue until count recovery  - Fludarabine , intermittent nausea, continue antiemetics   - Fludarabine , IVIG today    TBI today  - HPC transplant today. Continue transplant hydration for 24 hours post infusion of bone marrow   - monitor for CRS / haplo storm - if T >/= 102.5 on days +1 or +2, would dose tocilizumab 8mg / kg IVPB x 1    C.Diff negative, started imodium  - post transplant CTX 2.2 - continue CTX hydration for 24 hours post infusion of last dose     1. Infectious Disease:   acyclovir   Oral Tab/Cap 400 milliGRAM(s) Oral every 8 hours  cefepime   IVPB      cefepime   IVPB 2000 milliGRAM(s) IV Intermittent every 8 hours  clotrimazole Lozenge 1 Lozenge Oral five times a day  fluconAZOLE   Tablet 400 milliGRAM(s) Oral daily    2. VOD Prophylaxis: Actigall, Glutamine, Heparin (dosed at 100 units / kg / day)     3. GI Prophylaxis:   famotidine    Tablet 20 milliGRAM(s) Oral two times a day    4. Mouthcare - NS / NaHCO3 rinses, Mycelex, Biotene; Skin care     5. GVHD prophylaxis   TBI day -1   CTX days +3, +4  MMF, tacrolimus to start on day +5     6. Transfuse & replete electrolytes prn   KCl 20mEq IV q 2 hours x 3 doses     7. IV hydration, daily weights, strict I&O, prn diuresis   Lasix 40mg IV x 1 0830  Lasix 40mg IV x 1 1600    8. PO intake as tolerated, nutrition follow up as needed, MVI, folic acid     9. Antiemetics, anti-diarrhea medications:   LORazepam   Injectable 1 milliGRAM(s) IV Push every 6 hours PRN  prochlorperazine   Injectable 10 milliGRAM(s) IV Push every 6 hours PRN  ondansetron Injectable (Chemo) 8 milliGRAM(s) IV Push every 8 hours  aprepitant (Chemo) 80 milliGRAM(s) Oral every 24 hours    10. OOB as tolerated, physical therapy consult if needed     11. Monitor coags / fibrinogen 2x week, vitamin K as needed     12. Monitor closely for clinical changes, monitor for fevers     13. Emotional support provided, plan of care discussed and questions addressed     14. Patient education done regarding chemotherapy prep, plan of care, restrictions and discharge planning     15. Continue regular social work input       I have written the above note for Dr. Wilder who performed service with me in the room.   Buck Sosa  NP-C ([phone])    I have seen and examined patient with NP, I agree with above note as scribed.                    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  +fatigue    O: Rest of ROS negative      O: Vitals:   Vital Signs Last 24 Hrs  T(C): 37.1 (2023 06:00), Max: 39.1 (2023 02:57)  T(F): 98.8 (2023 06:00), Max: 102.4 (2023 02:57)  HR: 100 (2023 06:00) (100 - 118)  BP: 103/60 (2023 06:00) (103/60 - 140/84)  BP(mean): --  RR: 20 (2023 06:00) (18 - 20)  SpO2: 98% (2023 06:00) (98% - 100%)    Parameters below as of 2023 16:20  Patient On (Oxygen Delivery Method): room air      Admit weight: 102.8kg  Daily Weight in k (2023 08:55)    Intake / Output:   - @ 07:01  -  06-12 @ 07:00  --------------------------------------------------------  IN: 8088.6 mL / OUT: 8050 mL / NET: 38.6 mL          PE:   Oropharynx: Clear  Oral Mucositis: (-)                                                        Grade:   CVS: RRR, +S1,S2  Lungs: CTA B/L  Abdomen: Soft, NT, ND, +BS  Extremities: No edema in BLE  Gastric Mucositis: (-)                                                 Grade:   Intestinal Mucositis:   (-)                                           Grade:   Skin: Intact  TLC: CDI  Neuro: Alert, Ox3  Pain: Denies        Labs:                         6.6    0.26  )-----------( 59       ( 2023 07:09 )             19.8     12    139  |  108  |  9   ----------------------------<  96  4.2   |  21<L>  |  0.78    Ca    8.2<L>      2023 07:06  Phos  3.1       Mg     2.0         TPro  6.2  /  Alb  3.5  /  TBili  1.0  /  DBili  0.2  /  AST  20  /  ALT  29  /  AlkPhos  77      Cultures:   Culture Results:   No growth to date. (06.10.23 @ 21:45)    Culture Results:   No growth to date. (06.10.23 @ 21:30)    Culture Results:   No growth to date. (06.10.23 @ 07:14)    Radiology:   ACC: [id number] EXAM:  XR CHEST PORTABLE URGENT 1V   ORDERED BY:  JACQUE ONEIL   PROCEDURE DATE:  06/10/2023    Impression:  Clear lungs.      Meds:   Antimicrobials:   acyclovir   Oral Tab/Cap 400 milliGRAM(s) Oral every 8 hours  cefepime   IVPB      cefepime   IVPB 2000 milliGRAM(s) IV Intermittent every 8 hours  clotrimazole Lozenge 1 Lozenge Oral five times a day  fluconAZOLE   Tablet 400 milliGRAM(s) Oral daily      Heme / Onc:   cyclophosphamide IVPB (eMAR) 3860 milliGRAM(s) IV Intermittent daily  heparin  Infusion 424 Unit(s)/Hr IV Continuous <Continuous>  mesna IVPB (eMAR) 1200 milliGRAM(s) IV Intermittent every 3 hours      GI:  famotidine    Tablet 20 milliGRAM(s) Oral two times a day  loperamide 2 milliGRAM(s) Oral every 4 hours PRN  senna 1 Tablet(s) Oral at bedtime PRN  sodium bicarbonate Mouth Rinse 10 milliLiter(s) Swish and Spit five times a day  ursodiol Capsule 300 milliGRAM(s) Oral two times a day      Cardiovascular:   furosemide   Injectable (Chemo) 20 milliGRAM(s) IV Push once    Other medications:   acetaminophen     Tablet .. 650 milliGRAM(s) Oral every 6 hours  aprepitant (Chemo) 80 milliGRAM(s) Oral every 24 hours  Biotene Dry Mouth Oral Rinse 5 milliLiter(s) Swish and Spit five times a day  Chemotherapy Worklist Order      chlorhexidine 4% Liquid 1 Application(s) Topical <User Schedule>  folic acid 1 milliGRAM(s) Oral daily  Infuse HPC Product      multivitamin 1 Tablet(s) Oral daily  ondansetron Injectable (Chemo) 8 milliGRAM(s) IV Push every 8 hours  sodium bicarbonate  Infusion (Chemo) 0.074 mEq/kG/Hr IV Continuous <Continuous>  sodium chloride 0.9% 1000 milliLiter(s) IV Continuous <Continuous>  sodium chloride 0.9%. 1000 milliLiter(s) IV Continuous <Continuous>  sodium chloride 0.9%. (Chemo) 1000 milliLiter(s) IV Continuous <Continuous>      PRN:   acetaminophen     Tablet .. 650 milliGRAM(s) Oral every 6 hours PRN  diphenhydrAMINE Injectable 25 milliGRAM(s) IV Push every 12 hours PRN  loperamide 2 milliGRAM(s) Oral every 4 hours PRN  prochlorperazine   Injectable 10 milliGRAM(s) IV Push every 6 hours PRN  senna 1 Tablet(s) Oral at bedtime PRN  sodium chloride 0.9% lock flush 10 milliLiter(s) IV Push every 1 hour PRN      A/P:    37 year old male with severe aplastic anemia with multiple lines of treatment admitted for a haplo-identical bone marrow transplant with  Flu / Cy / TBI preparative regimen   Day + 4  start CTX hydration - 1/2NS @ 200ml /hr    Strict I&O, daily weights, prn diuresis   Day -6 - day -2 - Fludarabine 30mg/m2 IV  Day -6 - day - 5 - CTX 14.5mg/kg/day IV. CTX to start after urine SG < 1.010. Mesna  12mg / kg - hemorrhagic cystitis ppx   Day -2 - IVIG 0.4 g/kg (ideal body weight) every 3 weeks. Premedication with Tylenol and benadryl   Day -1 -  cGy x 1 dose   Day - 1 - at 2200 begin transplant hydration : 0.45Ns + 1 amp (50mEq) sodium bicarbonate at 150ml /hr; continue transplant hydration for 24 hours post infusion    Day + 2 at 2200 - begin CTX hydration (0.45NS + 10 mEq KCl/ @150ml /m2  continue 24 hours post last dose of CTX   Day + 3 - + 4 - CTX 50mg/kg/day IV. Begin CTX when urine SG < 1.010. EKG daily. Mesna for hemorraghic cystitis ppx.   Day + 5 - start Zarxio,  MMF and Tacrolimus. Check Tacrolimus levels on Monday and Thursday.   when ANC < 500, start Cipro 500mg po BID. If becomes febrile, pan cx, CXR and change Cipro to Cefepime 2g IV q 8 hours. Continue until count recovery  - Fludarabine , intermittent nausea, continue antiemetics   - Fludarabine , IVIG today    TBI today  - HPC transplant today. Continue transplant hydration for 24 hours post infusion of bone marrow   - monitor for CRS / haplo storm - if T >/= 102.5 on days +1 or +2, would dose tocilizumab 8mg / kg IVPB x 1    C.Diff negative, started imodium  - post transplant CTX 2.2 - continue CTX hydration for 24 hours post infusion of last dose     1. Infectious Disease:   acyclovir   Oral Tab/Cap 400 milliGRAM(s) Oral every 8 hours  cefepime   IVPB      cefepime   IVPB 2000 milliGRAM(s) IV Intermittent every 8 hours  clotrimazole Lozenge 1 Lozenge Oral five times a day  fluconAZOLE   Tablet 400 milliGRAM(s) Oral daily    2. VOD Prophylaxis: Actigall, Glutamine, Heparin (dosed at 100 units / kg / day)     3. GI Prophylaxis:   famotidine    Tablet 20 milliGRAM(s) Oral two times a day    4. Mouthcare - NS / NaHCO3 rinses, Mycelex, Biotene; Skin care     5. GVHD prophylaxis   TBI day -1   CTX days +3, +4  MMF, tacrolimus to start on day +5     6. Transfuse & replete electrolytes prn   KCl 20mEq IV q 2 hours x 3 doses     7. IV hydration, daily weights, strict I&O, prn diuresis   Lasix 40mg IV x 1 0830  Lasix 40mg IV x 1 1600    8. PO intake as tolerated, nutrition follow up as needed, MVI, folic acid     9. Antiemetics, anti-diarrhea medications:   LORazepam   Injectable 1 milliGRAM(s) IV Push every 6 hours PRN  prochlorperazine   Injectable 10 milliGRAM(s) IV Push every 6 hours PRN  ondansetron Injectable (Chemo) 8 milliGRAM(s) IV Push every 8 hours  aprepitant (Chemo) 80 milliGRAM(s) Oral every 24 hours    10. OOB as tolerated, physical therapy consult if needed     11. Monitor coags / fibrinogen 2x week, vitamin K as needed     12. Monitor closely for clinical changes, monitor for fevers     13. Emotional support provided, plan of care discussed and questions addressed     14. Patient education done regarding chemotherapy prep, plan of care, restrictions and discharge planning     15. Continue regular social work input       I have written the above note for Dr. Wilder who performed service with me in the room.   Buck Sosa  NP-C ([phone])    I have seen and examined patient with NP, I agree with above note as scribed.                    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  +fatigue    O: All other ROS negative    O: Vitals:   Vital Signs Last 24 Hrs  T(C): 37.1 (2023 06:00), Max: 39.1 (2023 02:57)  T(F): 98.8 (2023 06:00), Max: 102.4 (2023 02:57)  HR: 100 (2023 06:00) (100 - 118)  BP: 103/60 (2023 06:00) (103/60 - 140/84)  BP(mean): --  RR: 20 (2023 06:00) (18 - 20)  SpO2: 98% (2023 06:00) (98% - 100%)    Parameters below as of 2023 16:20  Patient On (Oxygen Delivery Method): room air    Admit weight: 102.8kg  Daily Weight in k.0 kg    Intake / Output:   -11 @ 07:01  -  06-12 @ 07:00  --------------------------------------------------------  IN: 8088.6 mL / OUT: 8050 mL / NET: 38.6 mL    PE:   Oropharynx: Clear  Oral Mucositis: (-)                                                        Grade:   CVS: RRR, +S1,S2  Lungs: CTA B/L  Abdomen: Soft, NT, ND, +BS  Extremities: No edema in BLE  Gastric Mucositis: (-)                                                 Grade:   Intestinal Mucositis:   (-)                                           Grade:   Skin: Intact  TLC: CDI  Neuro: Alert, Ox3  Pain: Denies    Labs:                         6.6    0.26  )-----------( 59       ( 2023 07:09 )             19.8     06-12    139  |  108  |  9   ----------------------------<  96  4.2   |  21<L>  |  0.78    Ca    8.2<L>      2023 07:06  Phos  3.1       Mg     2.0         TPro  6.2  /  Alb  3.5  /  TBili  1.0  /  DBili  0.2  /  AST  20  /  ALT  29  /  AlkPhos  77      Cultures:   Culture Results:   No growth to date. (06.10.23 @ 21:45)    Culture Results:   No growth to date. (06.10.23 @ 21:30)    Culture Results:   No growth to date. (06.10.23 @ 07:14)    Radiology:   ACC: [id number] EXAM:  XR CHEST PORTABLE URGENT 1V   ORDERED BY:  JACQUE ONEIL   PROCEDURE DATE:  06/10/2023    Impression:  Clear lungs.    Meds:   Antimicrobials:   acyclovir   Oral Tab/Cap 400 milliGRAM(s) Oral every 8 hours  cefepime   IVPB      cefepime   IVPB 2000 milliGRAM(s) IV Intermittent every 8 hours  clotrimazole Lozenge 1 Lozenge Oral five times a day  fluconAZOLE   Tablet 400 milliGRAM(s) Oral daily    Heme / Onc:   cyclophosphamide IVPB (eMAR) 3860 milliGRAM(s) IV Intermittent daily  heparin  Infusion 424 Unit(s)/Hr IV Continuous <Continuous>  mesna IVPB (eMAR) 1200 milliGRAM(s) IV Intermittent every 3 hours    GI:  famotidine    Tablet 20 milliGRAM(s) Oral two times a day  loperamide 2 milliGRAM(s) Oral every 4 hours PRN  senna 1 Tablet(s) Oral at bedtime PRN  sodium bicarbonate Mouth Rinse 10 milliLiter(s) Swish and Spit five times a day  ursodiol Capsule 300 milliGRAM(s) Oral two times a day    Cardiovascular:   furosemide   Injectable (Chemo) 20 milliGRAM(s) IV Push once    Other medications:   acetaminophen     Tablet .. 650 milliGRAM(s) Oral every 6 hours  aprepitant (Chemo) 80 milliGRAM(s) Oral every 24 hours  Biotene Dry Mouth Oral Rinse 5 milliLiter(s) Swish and Spit five times a day  Chemotherapy Worklist Order      chlorhexidine 4% Liquid 1 Application(s) Topical <User Schedule>  folic acid 1 milliGRAM(s) Oral daily  Infuse HPC Product      multivitamin 1 Tablet(s) Oral daily  ondansetron Injectable (Chemo) 8 milliGRAM(s) IV Push every 8 hours  sodium bicarbonate  Infusion (Chemo) 0.074 mEq/kG/Hr IV Continuous <Continuous>  sodium chloride 0.9% 1000 milliLiter(s) IV Continuous <Continuous>  sodium chloride 0.9%. 1000 milliLiter(s) IV Continuous <Continuous>  sodium chloride 0.9%. (Chemo) 1000 milliLiter(s) IV Continuous <Continuous>    PRN:   acetaminophen     Tablet .. 650 milliGRAM(s) Oral every 6 hours PRN  diphenhydrAMINE Injectable 25 milliGRAM(s) IV Push every 12 hours PRN  loperamide 2 milliGRAM(s) Oral every 4 hours PRN  prochlorperazine   Injectable 10 milliGRAM(s) IV Push every 6 hours PRN  senna 1 Tablet(s) Oral at bedtime PRN  sodium chloride 0.9% lock flush 10 milliLiter(s) IV Push every 1 hour PRN    A/P:    37 year old male with severe aplastic anemia with multiple lines of treatment admitted for a haplo-identical bone marrow transplant with  Flu / Cy / TBI preparative regimen   Day + 4  start CTX hydration - 1/2NS @ 200ml /hr    Strict I&O, daily weights, prn diuresis   Day -6 - day -2 - Fludarabine 30mg/m2 IV  Day -6 - day - 5 - CTX 14.5mg/kg/day IV. CTX to start after urine SG < 1.010. Mesna  12mg / kg - hemorrhagic cystitis ppx   Day -2 - IVIG 0.4 g/kg (ideal body weight) every 3 weeks. Premedication with Tylenol and benadryl   Day -1 -  cGy x 1 dose   Day - 1 - at 2200 begin transplant hydration : 0.45Ns + 1 amp (50mEq) sodium bicarbonate at 150ml /hr; continue transplant hydration for 24 hours post infusion    Day + 2 at 2200 - begin CTX hydration (0.45NS + 10 mEq KCl/ @150ml /m2  continue 24 hours post last dose of CTX   Day + 3 - + 4 - CTX 50mg/kg/day IV. Begin CTX when urine SG < 1.010. EKG daily. Mesna for hemorraghic cystitis ppx.   Day + 5 - start Zarxio,  MMF and Tacrolimus. Check Tacrolimus levels on Monday and Thursday.   when ANC < 500, start Cipro 500mg po BID. If becomes febrile, pan cx, CXR and change Cipro to Cefepime 2g IV q 8 hours. Continue until count recovery  - Fludarabine , intermittent nausea, continue antiemetics   - Fludarabine , IVIG today    TBI today  - HPC transplant today. Continue transplant hydration for 24 hours post infusion of bone marrow   - monitor for CRS / haplo storm - if T >/= 102.5 on days +1 or +2, would dose tocilizumab 8mg / kg IVPB x 1    C.Diff negative, started imodium  - post transplant CTX 2.2 - continue CTX hydration for 24 hours post infusion of last dose , Tosilizumab x1 dose O/N for temp of 39.1.     1. Infectious Disease:   acyclovir   Oral Tab/Cap 400 milliGRAM(s) Oral every 8 hours  cefepime   IVPB 2000 milliGRAM(s) IV Intermittent every 8 hours  clotrimazole Lozenge 1 Lozenge Oral five times a day  fluconAZOLE   Tablet 400 milliGRAM(s) Oral daily    2. VOD Prophylaxis: Actigall, Glutamine, Heparin (dosed at 100 units / kg / day)     3. GI Prophylaxis:   famotidine    Tablet 20 milliGRAM(s) Oral two times a day    4. Mouthcare - NS / NaHCO3 rinses, Mycelex, Biotene; Skin care     5. GVHD prophylaxis   TBI day -1   CTX days +3, +4  MMF, tacrolimus to start on day +5     6. Transfuse & replete electrolytes prn   KCl 20mEq IV q 2 hours x 3 doses     7. IV hydration, daily weights, strict I&O, prn diuresis   Lasix 40mg IV x 1 0830  Lasix 40mg IV x 1 1600    8. PO intake as tolerated, nutrition follow up as needed, MVI, folic acid     9. Antiemetics, anti-diarrhea medications:   LORazepam   Injectable 1 milliGRAM(s) IV Push every 6 hours PRN  prochlorperazine   Injectable 10 milliGRAM(s) IV Push every 6 hours PRN  ondansetron Injectable (Chemo) 8 milliGRAM(s) IV Push every 8 hours  aprepitant (Chemo) 80 milliGRAM(s) Oral every 24 hours    10. OOB as tolerated, physical therapy consult if needed     11. Monitor coags / fibrinogen 2x week, vitamin K as needed     12. Monitor closely for clinical changes, monitor for fevers     13. Emotional support provided, plan of care discussed and questions addressed     14. Patient education done regarding chemotherapy prep, plan of care, restrictions and discharge planning     15. Continue regular social work input     I have written the above note for Dr. Wilder who performed service with me in the room.   Buck Sosa  NP-C ([phone])    I have seen and examined patient with NP, I agree with above note as scribed.

## 2023-06-12 NOTE — PROVIDER CONTACT NOTE (OTHER) - ACTION/TREATMENT ORDERED:
K phos 30mmol IV X 1 was started. pt. aware that his phosphorus is low and needs to get K phos. supplement thru the IV , pt. express understanding.

## 2023-06-12 NOTE — PROGRESS NOTE ADULT - NS ATTEND AMEND GEN_ALL_CORE FT
1. Admit to BMTU Today is day + 3  2. TLC placement in IR   3. Day -6 - day + 5 - antiemetics   4. Day - 6 start CTX hydration - 1/2NS @ 200ml /hr    5. Strict I&O, daily weights, prn diuresis   6. Day -6 - day -2 - Fludarabine 30mg/m2 IV  7. Day -6 - day - 5 - CTX 14.5mg/kg/day IV. CTX to start after urine SG < 1.010. Mesna  12mg / kg - hemorrhagic cystitis ppx   8. Day -2 - IVIG 0.4 g/kg (ideal body weight) every 3 weeks. Premedication with Tylenol and benadryl  9. Day -1 -  cGy x 1 dose   10. Day - 1 - at 2200 begin transplant hydration : 0.45Ns + 1 amp (50mEq) sodium bicarbonate at 150ml /hr; continue transplant hydration for 24 hours post infusion   11. Day 0 – HPC transplant   12. Day + 2 at 2200 - begin CTX hydration (0.45NS + 10 mEq KCl/ @150ml /m2  continue 24 hours post last dose of CTX   13. Day + 3 - + 4 - CTX 50mg/kg/day IV. Begin CTX when urine SG < 1.010. EKG daily. Mesna for hemorraghic cystitis ppx.   14. Day + 5 - start Zarxio,  MMF and Tacrolimus. Check Tacrolimus levels on Monday and Thursday.   15. Anxiolytics, antinausea, antidiarrhea medications as needed   16. Lasix PRN while being aggressively hydrated to avoid VOD   17. Nutritional support, pain management  Need for prophylactic measures:  1. VOD prophylaxis - low dose heparin gtt (dosed at 100 units / kg / day), glutamine supplementation, Actigall BID   2. PCP prophylaxis - Bactrim DS through day -2    3. Antiviral prophylaxis - Continue Acyclovir   4. Antifungal prophylaxis- Diflucan 400 mg po daily.  5.. GI prophylaxis - Protonix po QD   6. Antibacterial prophylaxis -  ANC < 500, started Cipro 500mg po BID. If becomes febrile, pan cx, CXR and change Cipro to Cefepime 2g IV q 8 hours. Continue until count recovery..slight rise in wbc noted after infusion of marrow  7. Aggressive mouth care and skin care as per protocol  8. GVHD prophylaxis- high dose CTX; MMF and Tacrolimus.  9. transfusion and electrolyte support    Patient with hemoglobin drop of ~2  on 6/4, likely due to chemotherapy however will repeat CBC tonight to establish stability, likely will require transfusion.  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  6/11 Febrile to 100.6F. Cipro switched to cefepime. Infectious work up ordered. 1. Admit to BMTU Today is day + 4  2. TLC placement in IR   3. Day -6 - day + 5 - antiemetics   4. Day - 6 start CTX hydration - 1/2NS @ 200ml /hr    5. Strict I&O, daily weights, prn diuresis   6. Day -6 - day -2 - Fludarabine 30mg/m2 IV  7. Day -6 - day - 5 - CTX 14.5mg/kg/day IV. CTX to start after urine SG < 1.010. Mesna  12mg / kg - hemorrhagic cystitis ppx   8. Day -2 - IVIG 0.4 g/kg (ideal body weight) every 3 weeks. Premedication with Tylenol and benadryl  9. Day -1 -  cGy x 1 dose   10. Day - 1 - at 2200 begin transplant hydration : 0.45Ns + 1 amp (50mEq) sodium bicarbonate at 150ml /hr; continue transplant hydration for 24 hours post infusion   11. Day 0 – HPC transplant   12. Day + 2 at 2200 - begin CTX hydration (0.45NS + 10 mEq KCl/ @150ml /m2  continue 24 hours post last dose of CTX   13. Day + 3 - + 4 - CTX 50mg/kg/day IV. Begin CTX when urine SG < 1.010. EKG daily. Mesna for hemorraghic cystitis ppx.   14. Day + 5 - start Zarxio,  MMF and Tacrolimus. Check Tacrolimus levels on Monday and Thursday.   15. Anxiolytics, antinausea, antidiarrhea medications as needed   16. Lasix PRN while being aggressively hydrated to avoid VOD   17. Nutritional support, pain management  Need for prophylactic measures:  1. VOD prophylaxis - low dose heparin gtt (dosed at 100 units / kg / day), glutamine supplementation, Actigall BID   2. PCP prophylaxis - Bactrim DS through day -2    3. Antiviral prophylaxis - Continue Acyclovir   4. Antifungal prophylaxis- Diflucan 400 mg po daily.  5.. GI prophylaxis - Protonix po QD   6. Antibacterial prophylaxis -  ANC < 500, started Cipro 500mg po BID. If becomes febrile, pan cx, CXR and change Cipro to Cefepime 2g IV q 8 hours. Continue until count recovery..slight rise in wbc noted after infusion of marrow  7. Aggressive mouth care and skin care as per protocol  8. GVHD prophylaxis- high dose CTX; MMF and Tacrolimus.  9. transfusion and electrolyte support    Patient with hemoglobin drop of ~2  on 6/4, likely due to chemotherapy however will repeat CBC tonight to establish stability, likely will require transfusion.  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  6/11 Febrile to 100.6F. Cipro switched to cefepime. Infectious work up ordered. 1. Admit to BMTU Today is day + 4  2. TLC placement in IR   3. Day -6 - day + 5 - antiemetics   4. Day - 6 start CTX hydration - 1/2NS @ 200ml /hr    5. Strict I&O, daily weights, prn diuresis   6. Day -6 - day -2 - Fludarabine 30mg/m2 IV  7. Day -6 - day - 5 - CTX 14.5mg/kg/day IV. CTX to start after urine SG < 1.010. Mesna  12mg / kg - hemorrhagic cystitis ppx   8. Day -2 - IVIG 0.4 g/kg (ideal body weight) every 3 weeks. Premedication with Tylenol and benadryl  9. Day -1 -  cGy x 1 dose   10. Day - 1 - at 2200 begin transplant hydration : 0.45Ns + 1 amp (50mEq) sodium bicarbonate at 150ml /hr; continue transplant hydration for 24 hours post infusion   11. Day 0 – HPC transplant   12. Day + 2 at 2200 - begin CTX hydration (0.45NS + 10 mEq KCl/ @150ml /m2  continue 24 hours post last dose of CTX   13. Day + 3 - + 4 - CTX 50mg/kg/day IV. Begin CTX when urine SG < 1.010. EKG daily. Mesna for hemorraghic cystitis ppx.   14. Day + 5 - start Zarxio,  MMF and Tacrolimus. Check Tacrolimus levels on Monday and Thursday.   15. Anxiolytics, antinausea, antidiarrhea medications as needed   16. Lasix PRN while being aggressively hydrated to avoid VOD   17. Nutritional support, pain management  Need for prophylactic measures:  1. VOD prophylaxis - low dose heparin gtt (dosed at 100 units / kg / day), glutamine supplementation, Actigall BID   2. PCP prophylaxis - Bactrim DS through day -2    3. Antiviral prophylaxis - Continue Acyclovir   4. Antifungal prophylaxis- Diflucan 400 mg po daily.  5.. GI prophylaxis - Protonix po QD   6. Antibacterial prophylaxis -  ANC < 500, started Cipro 500mg po BID. If becomes febrile, pan cx, CXR and change Cipro to Cefepime 2g IV q 8 hours. Continue until count recovery..slight rise in wbc noted after infusion of marrow  7. Aggressive mouth care and skin care as per protocol  8. GVHD prophylaxis- high dose CTX; MMF and Tacrolimus.  9. transfusion and electrolyte support    Patient with hemoglobin drop of ~2  on 6/4, likely due to chemotherapy however will repeat CBC tonight to establish stability, likely will require transfusion.  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  6/11 Febrile to 100.6F. Cipro switched to cefepime. Infectious work up ordered.  6/12 day 4 ctx today...given toci for haplo storm 1. Admit to BMTU Today is day + 4  2. TLC placement in IR   3. Day -6 - day + 5 - antiemetics   4. Day - 6 start CTX hydration - 1/2NS @ 200ml /hr    5. Strict I&O, daily weights, prn diuresis   6. Day -6 - day -2 - Fludarabine 30mg/m2 IV  7. Day -6 - day - 5 - CTX 14.5mg/kg/day IV. CTX to start after urine SG < 1.010. Mesna  12mg / kg - hemorrhagic cystitis ppx   8. Day -2 - IVIG 0.4 g/kg (ideal body weight) every 3 weeks. Premedication with Tylenol and benadryl  9. Day -1 -  cGy x 1 dose   10. Day - 1 - at 2200 begin transplant hydration : 0.45Ns + 1 amp (50mEq) sodium bicarbonate at 150ml /hr; continue transplant hydration for 24 hours post infusion   11. Day 0 – HPC transplant   12. Day + 2 at 2200 - begin CTX hydration (0.45NS + 10 mEq KCl/ @150ml /m2  continue 24 hours post last dose of CTX   13. Day + 3 - + 4 - CTX 50mg/kg/day IV. Begin CTX when urine SG < 1.010. EKG daily. Mesna for hemorraghic cystitis ppx.   14. Day + 5 - start Zarxio,  MMF and Tacrolimus. Check Tacrolimus levels on Monday and Thursday.   15. Anxiolytics, antinausea, antidiarrhea medications as needed   16. Lasix PRN while being aggressively hydrated to avoid VOD   17. Nutritional support, pain management  Need for prophylactic measures:  1. VOD prophylaxis - low dose heparin gtt (dosed at 100 units / kg / day), glutamine supplementation, Actigall BID   2. PCP prophylaxis - Bactrim DS through day -2    3. Antiviral prophylaxis - Continue Acyclovir   4. Antifungal prophylaxis- Diflucan 400 mg po daily.  5.. GI prophylaxis - Protonix po QD   6. Antibacterial prophylaxis -  ANC < 500, started Cipro 500mg po BID. If becomes febrile, pan cx, CXR and change Cipro to Cefepime 2g IV q 8 hours. Continue until count recovery..slight rise in wbc noted after infusion of marrow  7. Aggressive mouth care and skin care as per protocol  8. GVHD prophylaxis- high dose CTX; MMF and Tacrolimus.  9. transfusion and electrolyte support    Patient with hemoglobin drop of ~2  on 6/4, likely due to chemotherapy however will repeat CBC tonight to establish stability, likely will require transfusion.  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  6/11 Febrile to 100.6F. Cipro switched to cefepime. Infectious work up ordered.  6/12 day 4 ctx today...given toci for haplo storm..some loose bowel, cdiff neg

## 2023-06-12 NOTE — PHARMACOTHERAPY INTERVENTION NOTE - COMMENTS
37-year-old male with severe aplastic anemia previously treated with eATG +CsA + eltrombopag in 2020. He relapsed 2021 and was treated with rATG + CSA + prednisone + eltombopag and then ravulizumab. He is here for a haploidentical allogeneic stem cell transplant, today is day +4. Course has been complicated by intermittent nausea, diarrhea, and haplostorm requiring toci x1.     Patient is CMV seropositive and received post transplant cyclophosphamide for GVHD prophylaxis, and therefore considered high-risk for CMV reactivation. Confirmed that patient will be starting letermovir on day +14 (). Will adjust tacrolimus dose appropriately, since letermovir is a CY inhibitor.    Asmita Carranza, PharmD, [facility]  Stem Cell Transplant Clinical Pharmacy Specialist

## 2023-06-13 LAB
ALBUMIN SERPL ELPH-MCNC: 3.7 G/DL — SIGNIFICANT CHANGE UP (ref 3.3–5)
ALP SERPL-CCNC: 68 U/L — SIGNIFICANT CHANGE UP (ref 40–120)
ALT FLD-CCNC: 28 U/L — SIGNIFICANT CHANGE UP (ref 10–45)
ANION GAP SERPL CALC-SCNC: 9 MMOL/L — SIGNIFICANT CHANGE UP (ref 5–17)
AST SERPL-CCNC: 27 U/L — SIGNIFICANT CHANGE UP (ref 10–40)
BILIRUB SERPL-MCNC: 0.7 MG/DL — SIGNIFICANT CHANGE UP (ref 0.2–1.2)
BUN SERPL-MCNC: 8 MG/DL — SIGNIFICANT CHANGE UP (ref 7–23)
CALCIUM SERPL-MCNC: 8.7 MG/DL — SIGNIFICANT CHANGE UP (ref 8.4–10.5)
CHLORIDE SERPL-SCNC: 107 MMOL/L — SIGNIFICANT CHANGE UP (ref 96–108)
CO2 SERPL-SCNC: 21 MMOL/L — LOW (ref 22–31)
CREAT SERPL-MCNC: 0.73 MG/DL — SIGNIFICANT CHANGE UP (ref 0.5–1.3)
EGFR: 122 ML/MIN/1.73M2 — SIGNIFICANT CHANGE UP
GLUCOSE SERPL-MCNC: 84 MG/DL — SIGNIFICANT CHANGE UP (ref 70–99)
HCT VFR BLD CALC: 22.1 % — LOW (ref 39–50)
HGB BLD-MCNC: 7.5 G/DL — LOW (ref 13–17)
LDH SERPL L TO P-CCNC: 415 U/L — HIGH (ref 50–242)
MAGNESIUM SERPL-MCNC: 2.1 MG/DL — SIGNIFICANT CHANGE UP (ref 1.6–2.6)
MCHC RBC-ENTMCNC: 31.4 PG — SIGNIFICANT CHANGE UP (ref 27–34)
MCHC RBC-ENTMCNC: 33.9 GM/DL — SIGNIFICANT CHANGE UP (ref 32–36)
MCV RBC AUTO: 92.5 FL — SIGNIFICANT CHANGE UP (ref 80–100)
NRBC # BLD: 0 /100 WBCS — SIGNIFICANT CHANGE UP (ref 0–0)
PHOSPHATE SERPL-MCNC: 3.6 MG/DL — SIGNIFICANT CHANGE UP (ref 2.5–4.5)
PLATELET # BLD AUTO: 46 K/UL — LOW (ref 150–400)
POTASSIUM SERPL-MCNC: 4.4 MMOL/L — SIGNIFICANT CHANGE UP (ref 3.5–5.3)
POTASSIUM SERPL-SCNC: 4.4 MMOL/L — SIGNIFICANT CHANGE UP (ref 3.5–5.3)
PROT SERPL-MCNC: 6.1 G/DL — SIGNIFICANT CHANGE UP (ref 6–8.3)
RBC # BLD: 2.39 M/UL — LOW (ref 4.2–5.8)
RBC # FLD: 14.9 % — HIGH (ref 10.3–14.5)
SODIUM SERPL-SCNC: 137 MMOL/L — SIGNIFICANT CHANGE UP (ref 135–145)
WBC # BLD: 0.13 K/UL — CRITICAL LOW (ref 3.8–10.5)
WBC # FLD AUTO: 0.13 K/UL — CRITICAL LOW (ref 3.8–10.5)

## 2023-06-13 PROCEDURE — ZZZZZ: CPT

## 2023-06-13 RX ORDER — POTASSIUM PHOSPHATE, MONOBASIC POTASSIUM PHOSPHATE, DIBASIC 236; 224 MG/ML; MG/ML
15 INJECTION, SOLUTION INTRAVENOUS ONCE
Refills: 0 | Status: DISCONTINUED | OUTPATIENT
Start: 2023-06-13 | End: 2023-06-13

## 2023-06-13 RX ORDER — FUROSEMIDE 40 MG
20 TABLET ORAL ONCE
Refills: 0 | Status: COMPLETED | OUTPATIENT
Start: 2023-06-13 | End: 2023-06-13

## 2023-06-13 RX ORDER — ONDANSETRON 8 MG/1
8 TABLET, FILM COATED ORAL EVERY 8 HOURS
Refills: 0 | Status: DISCONTINUED | OUTPATIENT
Start: 2023-06-13 | End: 2023-06-13

## 2023-06-13 RX ADMIN — Medication 5 MILLILITER(S): at 07:54

## 2023-06-13 RX ADMIN — Medication 5 MILLILITER(S): at 13:01

## 2023-06-13 RX ADMIN — MYCOPHENOLATE MOFETIL 1000 MILLIGRAM(S): 250 CAPSULE ORAL at 13:07

## 2023-06-13 RX ADMIN — Medication 1 TABLET(S): at 13:03

## 2023-06-13 RX ADMIN — Medication 400 MILLIGRAM(S): at 13:07

## 2023-06-13 RX ADMIN — MESNA 1200 MILLIGRAM(S): 100 INJECTION, SOLUTION INTRAVENOUS at 01:00

## 2023-06-13 RX ADMIN — FAMOTIDINE 20 MILLIGRAM(S): 10 INJECTION INTRAVENOUS at 17:20

## 2023-06-13 RX ADMIN — FAMOTIDINE 20 MILLIGRAM(S): 10 INJECTION INTRAVENOUS at 05:34

## 2023-06-13 RX ADMIN — Medication 1 MILLIGRAM(S): at 13:03

## 2023-06-13 RX ADMIN — CHLORHEXIDINE GLUCONATE 1 APPLICATION(S): 213 SOLUTION TOPICAL at 07:54

## 2023-06-13 RX ADMIN — Medication 480 MICROGRAM(S): at 12:58

## 2023-06-13 RX ADMIN — Medication 1 LOZENGE: at 23:29

## 2023-06-13 RX ADMIN — URSODIOL 300 MILLIGRAM(S): 250 TABLET, FILM COATED ORAL at 05:34

## 2023-06-13 RX ADMIN — Medication 1 LOZENGE: at 20:13

## 2023-06-13 RX ADMIN — Medication 10 MILLILITER(S): at 07:54

## 2023-06-13 RX ADMIN — ONDANSETRON 8 MILLIGRAM(S): 8 TABLET, FILM COATED ORAL at 05:35

## 2023-06-13 RX ADMIN — Medication 400 MILLIGRAM(S): at 05:34

## 2023-06-13 RX ADMIN — ONDANSETRON 8 MILLIGRAM(S): 8 TABLET, FILM COATED ORAL at 13:08

## 2023-06-13 RX ADMIN — CEFEPIME 100 MILLIGRAM(S): 1 INJECTION, POWDER, FOR SOLUTION INTRAMUSCULAR; INTRAVENOUS at 13:07

## 2023-06-13 RX ADMIN — TACROLIMUS 1 MILLIGRAM(S): 5 CAPSULE ORAL at 05:35

## 2023-06-13 RX ADMIN — Medication 10 MILLIGRAM(S): at 14:31

## 2023-06-13 RX ADMIN — Medication 1 LOZENGE: at 16:31

## 2023-06-13 RX ADMIN — Medication 2 MILLIGRAM(S): at 05:57

## 2023-06-13 RX ADMIN — Medication 10 MILLILITER(S): at 13:00

## 2023-06-13 RX ADMIN — TACROLIMUS 1 MILLIGRAM(S): 5 CAPSULE ORAL at 17:19

## 2023-06-13 RX ADMIN — Medication 10 MILLILITER(S): at 23:29

## 2023-06-13 RX ADMIN — LIDOCAINE AND PRILOCAINE CREAM 1 APPLICATION(S): 25; 25 CREAM TOPICAL at 12:59

## 2023-06-13 RX ADMIN — FLUCONAZOLE 400 MILLIGRAM(S): 150 TABLET ORAL at 13:03

## 2023-06-13 RX ADMIN — Medication 1 LOZENGE: at 13:07

## 2023-06-13 RX ADMIN — Medication 400 MILLIGRAM(S): at 21:14

## 2023-06-13 RX ADMIN — CEFEPIME 100 MILLIGRAM(S): 1 INJECTION, POWDER, FOR SOLUTION INTRAMUSCULAR; INTRAVENOUS at 21:14

## 2023-06-13 RX ADMIN — APREPITANT 80 MILLIGRAM(S): 80 CAPSULE ORAL at 13:05

## 2023-06-13 RX ADMIN — SODIUM CHLORIDE 20 MILLILITER(S): 9 INJECTION INTRAMUSCULAR; INTRAVENOUS; SUBCUTANEOUS at 20:12

## 2023-06-13 RX ADMIN — Medication 1 LOZENGE: at 07:54

## 2023-06-13 RX ADMIN — Medication 10 MILLILITER(S): at 20:13

## 2023-06-13 RX ADMIN — HEPARIN SODIUM 4.24 UNIT(S)/HR: 5000 INJECTION INTRAVENOUS; SUBCUTANEOUS at 20:13

## 2023-06-13 RX ADMIN — Medication 5 MILLILITER(S): at 16:30

## 2023-06-13 RX ADMIN — CEFEPIME 100 MILLIGRAM(S): 1 INJECTION, POWDER, FOR SOLUTION INTRAMUSCULAR; INTRAVENOUS at 05:34

## 2023-06-13 RX ADMIN — MYCOPHENOLATE MOFETIL 1000 MILLIGRAM(S): 250 CAPSULE ORAL at 05:35

## 2023-06-13 RX ADMIN — Medication 5 MILLILITER(S): at 23:29

## 2023-06-13 RX ADMIN — Medication 20 MILLIGRAM(S): at 06:11

## 2023-06-13 RX ADMIN — ONDANSETRON 8 MILLIGRAM(S): 8 TABLET, FILM COATED ORAL at 20:19

## 2023-06-13 RX ADMIN — Medication 5 MILLILITER(S): at 20:18

## 2023-06-13 RX ADMIN — MYCOPHENOLATE MOFETIL 1000 MILLIGRAM(S): 250 CAPSULE ORAL at 21:14

## 2023-06-13 RX ADMIN — URSODIOL 300 MILLIGRAM(S): 250 TABLET, FILM COATED ORAL at 17:19

## 2023-06-13 RX ADMIN — Medication 10 MILLILITER(S): at 16:31

## 2023-06-13 NOTE — CHART NOTE - NSCHARTNOTEFT_GEN_A_CORE
Nutrition Follow Up Note  Patient seen for: nutrition follow-up     Chart reviewed, events noted. Per chart "37 year old male with severe aplastic anemia with multiple lines of treatment admitted for a haplo-identical bone marrow transplant with  Flu / Cy / TBI preparative regimen . Day + 5"     Source: [X] Patient       [x] EMR        [] RN        [] Family at bedside       [] Other:    -If unable to interview patient: [] Trach/Vent/BiPAP  [] Disoriented/confused/inappropriate to interview    Diet Order:   Diet, Regular:   GlutaSolve(Glutamine) 15gm pkg     Qty per Day:  1  Supplement Feeding Modality:  Oral  Ensure Clear Cans or Servings Per Day:  2       Frequency:  Daily (23)    - Is current order appropriate/adequate?  see below     - PO intake :   [] >75%  Adequate    [] 50-75%  Fair       [X] <50%  Poor    - Nutrition-related concerns:      - Intake: Pt reports poor appetite/PO intake, <50% of meals consumed x >5 days. Drinking Ensure Clear 1x/day, flavor preferences obtained encouraged to drink 2x/day. Drinking vegan smoothie of day 1x/day, will continue to provide. Amenable to additional oral nutritional supplement: Signadyne Standard 1.0 1x/day. Food preferences obtained; RD to honor as able.       - GI: Persistent nausea (continues on Compazine, Zofran PRN), loose BM (C. Diff negative PCR ). No GI distress reported at time of RD visit. Last BM: . Bowel regimen: senna, imodium      - Renal: K+ Phos and Mg WNL today . Continues on IVF.       - Micronutrient/Other supplementation: multivitamin , folic acid     Weights:   Dosing Weight in k.8 (-)  Daily Weight in k.6 (), Weight in k (), Weight in k (), Weight in k.9 (06-10), Weight in k.3 (), Weight in k.2 ()  ** Weight trending downward (4% weight loss x 1.5 weeks) in setting of IVF, poor PO intake (? true weight changes masked by fluid shifts). RD to continue to monitor weight trends as able.     Nutrition focused physical exam conducted:    Subcutaneous fat loss: [mild] Orbital fat pads region, [mild ]Buccal fat region  Muscle wasting: [ mild]Temples region    Nutritionally Pertinent MEDICATIONS  (STANDING):  acyclovir   Oral Tab/Cap  cefepime   IVPB  cefepime   IVPB  clotrimazole Lozenge  famotidine    Tablet  fluconAZOLE   Tablet  folic acid  multivitamin  sodium bicarbonate  Infusion (Chemo)  sodium bicarbonate Mouth Rinse  sodium chloride 0.9%  sodium chloride 0.9%.  sodium chloride 0.9%. (Chemo)  ursodiol Capsule    Pertinent Labs:  @ 06:55: Na 137, BUN 8, Cr 0.73, BG 84, K+ 4.4, Phos 3.6, Mg 2.1, Alk Phos 68, ALT/SGPT 28, AST/SGOT 27, HbA1c --   @ 22:11: Na 137, BUN 12, Cr 0.78, <H>, K+ 3.8, Phos 2.3<L>, Mg 2.1, Alk Phos 77, ALT/SGPT 32, AST/SGOT 23, HbA1c --    Skin per nursing documentation: no pressure injuries noted   Edema: no peripheral edema noted per nursing flow sheets     Estimated Needs:   [X] no change since previous assessment  [] recalculated:     Previous Nutrition Diagnosis: inadequate protein-energy intake   Nutrition Diagnosis is: [X] upgraded     New Nutrition Diagnosis: [X] Moderate acute Malnutrition RT decreased ability to consume adequate protein-energy in setting of treatment side effects AEB SCT +5, <50% EER x >5 days, 4% weight loss x 1.5 weeks, mild muscle/fat depletion   Goal: Pt will meet >75% estimated nutritional needs daily via best tolerated route     Nutrition Care Plan:  [X] In Progress  [] Achieved  [] Not applicable    Nutrition Interventions:     Education Provided:       [X] Yes:  [] No: Discussed importance of adequate consumption of meals/supplements to optimize protein-energy intake. Encouraged small/frequent meals, nutrient dense snacks, prioritizing protein foods at meal time. Reinforced nausea nutrition therapy.        Recommendations:         [X] Continue current diet order: Regular            [X] Continue PO supplementation: Ensure Clear 2x/day            [X] RD will provide vegan protein-fortified smoothie of day 1x/day             [X] Trial HP gelatin 1x/day , Add PO supplement: Signadyne Standard 1.0 1x/day     [X] Encourage PO intake, obtain food preferences, provide feeding assistance as needed.      [X] Continue to monitor electrolytes and replete if low.      [X] New malnutrition notification sent.     Monitoring and Evaluation:   Continue to monitor nutritional intake, tolerance to diet prescription, weights, labs, skin integrity    RD remains available upon request and will follow up per protocol  Kelli Spivey RDN CDN [facility] #[phone] or TEAMS

## 2023-06-13 NOTE — PROVIDER CONTACT NOTE (OTHER) - RECOMMENDATIONS
notify provider, and KISHA Houston ordered K phos 30mmol / in 500cc NS  IV  x 1 dose
notify provider, and KISHA Houston ordered lasix 20mgs IV

## 2023-06-13 NOTE — PROGRESS NOTE ADULT - NS ATTEND AMEND GEN_ALL_CORE FT
1. Admit to BMTU Today is day + 5  2. TLC placement in IR   3. Day -6 - day + 5 - antiemetics   4. Day - 6 start CTX hydration - 1/2NS @ 200ml /hr    5. Strict I&O, daily weights, prn diuresis   6. Day -6 - day -2 - Fludarabine 30mg/m2 IV  7. Day -6 - day - 5 - CTX 14.5mg/kg/day IV. CTX to start after urine SG < 1.010. [place]  12mg / kg - hemorrhagic cystitis ppx   8. Day -2 - IVIG 0.4 g/kg (ideal body weight) every 3 weeks. Premedication with Tylenol and benadryl  9. Day -1 -  cGy x 1 dose   10. Day - 1 - at 2200 begin transplant hydration : 0.45Ns + 1 amp (50mEq) sodium bicarbonate at 150ml /hr; continue transplant hydration for 24 hours post infusion   11. Day 0 – [facility] transplant   12. Day + 2 at 2200 - begin CTX hydration (0.45NS + 10 mEq KCl/ @150ml /m2  continue 24 hours post last dose of CTX   13. Day + 3 - + 4 - CTX 50mg/kg/day IV. Begin CTX when urine SG < 1.010. EKG daily. [place] for hemorraghic cystitis ppx.   14. Day + 5 - start Zarxio,  MMF and Tacrolimus. Check Tacrolimus levels on Monday and Thursday.   15. Anxiolytics, antinausea, antidiarrhea medications as needed   16. Lasix PRN while being aggressively hydrated to avoid VOD   17. Nutritional support, pain management  Need for prophylactic measures:  1. VOD prophylaxis - low dose heparin gtt (dosed at 100 units / kg / day), glutamine supplementation, Actigall BID   2. PCP prophylaxis - Bactrim DS through day -2    3. Antiviral prophylaxis - Continue Acyclovir   4. Antifungal prophylaxis- Diflucan 400 mg po daily.  5.. GI prophylaxis - Protonix po QD   6. Antibacterial prophylaxis -  ANC < 500, started Cipro 500mg po BID. If becomes febrile, pan cx, CXR and change Cipro to Cefepime 2g IV q 8 hours. Continue until count recovery..slight rise in wbc noted after infusion of marrow  7. Aggressive mouth care and skin care as per protocol  8. GVHD prophylaxis- high dose CTX; MMF and Tacrolimus.  9. transfusion and electrolyte support    Patient with hemoglobin drop of ~2  on 6/4, likely due to chemotherapy however will repeat CBC tonight to establish stability, likely will require transfusion.  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  6/11 Febrile to 100.6F. Cipro switched to cefepime. Infectious work up ordered.  6/12 day 4 ctx today...given toci for haplo storm..some loose bowel, cdiff neg 1. Admit to BMTU Today is day + 5  2. TLC placement in IR   3. Day -6 - day + 5 - antiemetics   4. Day - 6 start CTX hydration - 1/2NS @ 200ml /hr    5. Strict I&O, daily weights, prn diuresis   6. Day -6 - day -2 - Fludarabine 30mg/m2 IV  7. Day -6 - day - 5 - CTX 14.5mg/kg/day IV. CTX to start after urine SG < 1.010. [place]  12mg / kg - hemorrhagic cystitis ppx   8. Day -2 - IVIG 0.4 g/kg (ideal body weight) every 3 weeks. Premedication with Tylenol and benadryl  9. Day -1 -  cGy x 1 dose   10. Day - 1 - at 2200 begin transplant hydration : 0.45Ns + 1 amp (50mEq) sodium bicarbonate at 150ml /hr; continue transplant hydration for 24 hours post infusion   11. Day 0 – HPC transplant   12. Day + 2 at 2200 - begin CTX hydration (0.45NS + 10 mEq KCl/ @150ml /m2  continue 24 hours post last dose of CTX   13. Day + 3 - + 4 - CTX 50mg/kg/day IV. Begin CTX when urine SG < 1.010. EKG daily. [place] for hemorraghic cystitis ppx.   14. Day + 5 - start Zarxio,  MMF and Tacrolimus. Check Tacrolimus levels on Monday and Thursday.   15. Anxiolytics, antinausea, antidiarrhea medications as needed   16. Lasix PRN while being aggressively hydrated to avoid VOD   17. Nutritional support, pain management  Need for prophylactic measures:  1. VOD prophylaxis - low dose heparin gtt (dosed at 100 units / kg / day), glutamine supplementation, Actigall BID   2. PCP prophylaxis - Bactrim DS through day -2    3. Antiviral prophylaxis - Continue Acyclovir   4. Antifungal prophylaxis- Diflucan 400 mg po daily.  5.. GI prophylaxis - Protonix po QD   6. Antibacterial prophylaxis -  ANC < 500, started Cipro 500mg po BID. If becomes febrile, pan cx, CXR and change Cipro to Cefepime 2g IV q 8 hours. Continue until count recovery..slight rise in wbc noted after infusion of marrow  7. Aggressive mouth care and skin care as per protocol  8. GVHD prophylaxis- high dose CTX; MMF and Tacrolimus.  9. transfusion and electrolyte support    Patient with hemoglobin drop of ~2  on 6/4, likely due to chemotherapy however will repeat CBC tonight to establish stability, likely will require transfusion.  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  6/11 Febrile to 100.6F. Cipro switched to cefepime. Infectious work up ordered.  6/12 day 4 ctx today...given toci for haplo storm..some loose bowel, cdiff neg  6/13 mmf, tacro and zarxio

## 2023-06-13 NOTE — PROGRESS NOTE ADULT - SUBJECTIVE AND OBJECTIVE BOX
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 () with Flu / Cy / TBI prep regimen for treatment of severe aplastic anemia    S: Patient seen and examined with HPC Transplant Team:       O: Vitals:   Vital Signs Last 24 Hrs  T(C): 36.9 (2023 05:58), Max: 37.2 (2023 19:12)  T(F): 98.4 (2023 05:58), Max: 99 (2023 19:12)  HR: 90 (2023 05:58) (88 - 104)  BP: 118/74 (2023 05:58) (104/64 - 135/69)  BP(mean): --  RR: 20 (2023 05:58) (18 - 20)  SpO2: 97% (2023 05:58) (97% - 100%)    Parameters below as of 2023 05:58  Patient On (Oxygen Delivery Method): room air        Admit weight: 102.8kg   Daily     Daily Weight in k (2023 09:25)    Intake / Output:    @ 07:01  -  06-13 @ 07:00  --------------------------------------------------------  IN: 9354.8 mL / OUT: 6570 mL / NET: 2784.8 mL      PE:   Oropharynx: Clear  Oral Mucositis: (-)                                                        Grade:   CVS: RRR, +S1,S2  Lungs: CTA B/L  Abdomen: Soft, NT, ND, +BS  Extremities: No edema in BLE  Gastric Mucositis: (-)                                                 Grade:   Intestinal Mucositis:   (-)                                           Grade:   Skin: Intact  TLC: CDI  Neuro: Alert, Ox3  Pain: Denies      Labs:       Cultures:   Culture Results:   No growth to date. (06.10.23 @ 21:45)    Culture Results:   No growth to date. (06.10.23 @ 21:30)    Culture Results:   No growth to date. (06.10.23 @ 07:14)    Radiology:   ACC: [id number] EXAM:  XR CHEST PORTABLE URGENT 1V   ORDERED BY:  JACQUE ONEIL   PROCEDURE DATE:  06/10/2023    Impression:  Clear lungs.      Meds:   Antimicrobials:   acyclovir   Oral Tab/Cap 400 milliGRAM(s) Oral every 8 hours  cefepime   IVPB      cefepime   IVPB 2000 milliGRAM(s) IV Intermittent every 8 hours  clotrimazole Lozenge 1 Lozenge Oral five times a day  fluconAZOLE   Tablet 400 milliGRAM(s) Oral daily      Heme / Onc:   heparin  Infusion 424 Unit(s)/Hr IV Continuous <Continuous>      GI:  famotidine    Tablet 20 milliGRAM(s) Oral two times a day  loperamide 2 milliGRAM(s) Oral every 4 hours PRN  senna 1 Tablet(s) Oral at bedtime PRN  sodium bicarbonate Mouth Rinse 10 milliLiter(s) Swish and Spit five times a day  ursodiol Capsule 300 milliGRAM(s) Oral two times a day      Cardiovascular:       Immunologic:   filgrastim-sndz (ZARXIO) Injectable (Chemo) 480 MICROGram(s) SubCutaneous every 24 hours  mycophenolate mofetil (Chemo) 1000 milliGRAM(s) Oral three times a day  tacrolimus (Chemo) 1 milliGRAM(s) Oral two times a day      Other medications:   acetaminophen     Tablet .. 650 milliGRAM(s) Oral every 6 hours  aprepitant (Chemo) 80 milliGRAM(s) Oral every 24 hours  Biotene Dry Mouth Oral Rinse 5 milliLiter(s) Swish and Spit five times a day  Chemotherapy Worklist Order      chlorhexidine 4% Liquid 1 Application(s) Topical <User Schedule>  folic acid 1 milliGRAM(s) Oral daily  Infuse HPC Product      lidocaine/prilocaine Cream (Chemo) 1 Application(s) Topical daily  multivitamin 1 Tablet(s) Oral daily  ondansetron Injectable (Chemo) 8 milliGRAM(s) IV Push every 8 hours  potassium phosphate IVPB 15 milliMole(s) IV Intermittent once  sodium bicarbonate  Infusion (Chemo) 0.074 mEq/kG/Hr IV Continuous <Continuous>  sodium chloride 0.9% 1000 milliLiter(s) IV Continuous <Continuous>  sodium chloride 0.9%. 1000 milliLiter(s) IV Continuous <Continuous>  sodium chloride 0.9%. (Chemo) 1000 milliLiter(s) IV Continuous <Continuous>      PRN:   acetaminophen     Tablet .. 650 milliGRAM(s) Oral every 6 hours PRN  diphenhydrAMINE 25 milliGRAM(s) Oral every 12 hours PRN  loperamide 2 milliGRAM(s) Oral every 4 hours PRN  prochlorperazine   Injectable 10 milliGRAM(s) IV Push every 6 hours PRN  senna 1 Tablet(s) Oral at bedtime PRN  sodium chloride 0.9% lock flush 10 milliLiter(s) IV Push every 1 hour PRN    A/P:  37 year old male with severe aplastic anemia with multiple lines of treatment admitted for a haplo-identical bone marrow transplant with  Flu / Cy / TBI preparative regimen   Day + 5  start CTX hydration - 1/2NS @ 200ml /hr    Strict I&O, daily weights, prn diuresis   Day -6 - day -2 - Fludarabine 30mg/m2 IV  Day -6 - day - 5 - CTX 14.5mg/kg/day IV. CTX to start after urine SG < 1.010. [place]  12mg / kg - hemorrhagic cystitis ppx   Day -2 - IVIG 0.4 g/kg (ideal body weight) every 3 weeks. Premedication with Tylenol and benadryl   Day -1 -  cGy x 1 dose   Day - 1 - at 2200 begin transplant hydration : 0.45Ns + 1 amp (50mEq) sodium bicarbonate at 150ml /hr; continue transplant hydration for 24 hours post infusion    Day + 2 at 2200 - begin CTX hydration (0.45NS + 10 mEq KCl/ @150ml /m2  continue 24 hours post last dose of CTX   Day + 3 - + 4 - CTX 50mg/kg/day IV. Begin CTX when urine SG < 1.010. EKG daily. [place] for hemorraghic cystitis ppx.   Day + 5 - start Zarxio,  MMF and Tacrolimus. Check Tacrolimus levels on Monday and Thursday.   when ANC < 500, start Cipro 500mg po BID. If becomes febrile, pan cx, CXR and change Cipro to Cefepime 2g IV q 8 hours. Continue until count recovery  - Fludarabine , intermittent nausea, continue antiemetics   - Fludarabine , IVIG today    TBI today  - HPC transplant today. Continue transplant hydration for 24 hours post infusion of bone marrow   - monitor for CRS / haplo storm - if T >/= 102.5 on days +1 or +2, would dose tocilizumab 8mg / kg IVPB x 1    C.Diff negative, started imodium  - post transplant CTX 2.2 - continue CTX hydration for 24 hours post infusion of last dose , Tosilizumab x1 dose O/N for temp of 39.1.   - Start tacrolimus / MMF today     1. Infectious Disease:   acyclovir   Oral Tab/Cap 400 milliGRAM(s) Oral every 8 hours  cefepime   IVPB      cefepime   IVPB 2000 milliGRAM(s) IV Intermittent every 8 hours  clotrimazole Lozenge 1 Lozenge Oral five times a day  fluconAZOLE   Tablet 400 milliGRAM(s) Oral daily    2. VOD Prophylaxis: Actigall, Glutamine, Heparin (dosed at 100 units / kg / day)     3. GI Prophylaxis:    famotidine    Tablet 20 milliGRAM(s) Oral two times a day    4. Mouthcare - NS / NaHCO3 rinses, Mycelex, Biotene; Skin care     5. GVHD prophylaxis   TBI day -1   CTX days +3, +4   mycophenolate mofetil (Chemo) 1000 milliGRAM(s) Oral three times a day  tacrolimus (Chemo) 1 milliGRAM(s) Oral two times a day    6. Transfuse & replete electrolytes prn   potassium phosphate IVPB 15 milliMole(s) IV Intermittent once    7. IV hydration, daily weights, strict I&O, prn diuresis     8. PO intake as tolerated, nutrition follow up as needed, MVI, folic acid     9. Antiemetics, anti-diarrhea medications:   loperamide 2 milliGRAM(s) Oral every 4 hours PRN  prochlorperazine   Injectable 10 milliGRAM(s) IV Push every 6 hours PRN  ondansetron Injectable (Chemo) 8 milliGRAM(s) IV Push every 8 hours  aprepitant (Chemo) 80 milliGRAM(s) Oral every 24 hours    10. OOB as tolerated, physical therapy consult if needed     11. Monitor coags / fibrinogen 2x week, vitamin K as needed     12. Monitor closely for clinical changes, monitor for fevers     13. Emotional support provided, plan of care discussed and questions addressed     14. Patient education done regarding plan of care, restrictions and discharge planning     15. Continue regular social work input     I have written the above note for Dr. Wilder who performed service with me in the room.   Mila De Guzman NP-C ([phone])    I have seen and examined patient with NP, I agree with above note as scribed.                    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 () with Flu / Cy / TBI prep regimen for treatment of severe aplastic anemia    S: Patient seen and examined with [facility]PC Transplant Team:       O: Vitals:   Vital Signs Last 24 Hrs  T(C): 36.9 (2023 05:58), Max: 37.2 (2023 19:12)  T(F): 98.4 (2023 05:58), Max: 99 (2023 19:12)  HR: 90 (2023 05:58) (88 - 104)  BP: 118/74 (2023 05:58) (104/64 - 135/69)  BP(mean): --  RR: 20 (2023 05:58) (18 - 20)  SpO2: 97% (2023 05:58) (97% - 100%)    Parameters below as of 2023 05:58  Patient On (Oxygen Delivery Method): room air        Admit weight: 102.8kg   Daily     Daily Weight in k (2023 09:25)    Intake / Output:    @ 07:01  -  06-13 @ 07:00  --------------------------------------------------------  IN: 9354.8 mL / OUT: 6570 mL / NET: 2784.8 mL      PE:   Oropharynx: Clear  Oral Mucositis: (-)                                                        Grade:   CVS: RRR, +S1,S2  Lungs: CTA B/L  Abdomen: Soft, NT, ND, +BS  Extremities: No edema in BLE  Gastric Mucositis: (-)                                                 Grade:   Intestinal Mucositis:   (-)                                           Grade:   Skin: Intact  TLC: CDI  Neuro: Alert, Ox3  Pain: Denies      Labs:       Cultures:   Culture Results:   No growth to date. (06.10.23 @ 21:45)    Culture Results:   No growth to date. (06.10.23 @ 21:30)    Culture Results:   No growth to date. (06.10.23 @ 07:14)    Radiology:   ACC: [id number] EXAM:  XR CHEST PORTABLE URGENT 1V   ORDERED BY:  JACQUE ONEIL   PROCEDURE DATE:  06/10/2023    Impression:  Clear lungs.      Meds:   Antimicrobials:   acyclovir   Oral Tab/Cap 400 milliGRAM(s) Oral every 8 hours  cefepime   IVPB      cefepime   IVPB 2000 milliGRAM(s) IV Intermittent every 8 hours  clotrimazole Lozenge 1 Lozenge Oral five times a day  fluconAZOLE   Tablet 400 milliGRAM(s) Oral daily      Heme / Onc:   heparin  Infusion 424 Unit(s)/Hr IV Continuous <Continuous>      GI:  famotidine    Tablet 20 milliGRAM(s) Oral two times a day  loperamide 2 milliGRAM(s) Oral every 4 hours PRN  senna 1 Tablet(s) Oral at bedtime PRN  sodium bicarbonate Mouth Rinse 10 milliLiter(s) Swish and Spit five times a day  ursodiol Capsule 300 milliGRAM(s) Oral two times a day      Cardiovascular:       Immunologic:   filgrastim-sndz (ZARXIO) Injectable (Chemo) 480 MICROGram(s) SubCutaneous every 24 hours  mycophenolate mofetil (Chemo) 1000 milliGRAM(s) Oral three times a day  tacrolimus (Chemo) 1 milliGRAM(s) Oral two times a day      Other medications:   acetaminophen     Tablet .. 650 milliGRAM(s) Oral every 6 hours  aprepitant (Chemo) 80 milliGRAM(s) Oral every 24 hours  Biotene Dry Mouth Oral Rinse 5 milliLiter(s) Swish and Spit five times a day  Chemotherapy Worklist Order      chlorhexidine 4% Liquid 1 Application(s) Topical <User Schedule>  folic acid 1 milliGRAM(s) Oral daily  Infuse HPC Product      lidocaine/prilocaine Cream (Chemo) 1 Application(s) Topical daily  multivitamin 1 Tablet(s) Oral daily  ondansetron Injectable (Chemo) 8 milliGRAM(s) IV Push every 8 hours  potassium phosphate IVPB 15 milliMole(s) IV Intermittent once  sodium bicarbonate  Infusion (Chemo) 0.074 mEq/kG/Hr IV Continuous <Continuous>  sodium chloride 0.9% 1000 milliLiter(s) IV Continuous <Continuous>  sodium chloride 0.9%. 1000 milliLiter(s) IV Continuous <Continuous>  sodium chloride 0.9%. (Chemo) 1000 milliLiter(s) IV Continuous <Continuous>      PRN:   acetaminophen     Tablet .. 650 milliGRAM(s) Oral every 6 hours PRN  diphenhydrAMINE 25 milliGRAM(s) Oral every 12 hours PRN  loperamide 2 milliGRAM(s) Oral every 4 hours PRN  prochlorperazine   Injectable 10 milliGRAM(s) IV Push every 6 hours PRN  senna 1 Tablet(s) Oral at bedtime PRN  sodium chloride 0.9% lock flush 10 milliLiter(s) IV Push every 1 hour PRN    A/P:  37 year old male with severe aplastic anemia with multiple lines of treatment admitted for a haplo-identical bone marrow transplant with  Flu / Cy / TBI preparative regimen   Day + 5  start CTX hydration - 1/2NS @ 200ml /hr    Strict I&O, daily weights, prn diuresis   Day -6 - day -2 - Fludarabine 30mg/m2 IV  Day -6 - day - 5 - CTX 14.5mg/kg/day IV. CTX to start after urine SG < 1.010. [place]  12mg / kg - hemorrhagic cystitis ppx   Day -2 - IVIG 0.4 g/kg (ideal body weight) every 3 weeks. Premedication with Tylenol and benadryl   Day -1 -  cGy x 1 dose   Day - 1 - at 2200 begin transplant hydration : 0.45Ns + 1 amp (50mEq) sodium bicarbonate at 150ml /hr; continue transplant hydration for 24 hours post infusion    Day + 2 at 2200 - begin CTX hydration (0.45NS + 10 mEq KCl/ @150ml /m2  continue 24 hours post last dose of CTX   Day + 3 - + 4 - CTX 50mg/kg/day IV. Begin CTX when urine SG < 1.010. EKG daily. [place] for hemorraghic cystitis ppx.   Day + 5 - start Zarxio,  MMF and Tacrolimus. Check Tacrolimus levels on Monday and Thursday.   when ANC < 500, start Cipro 500mg po BID. If becomes febrile, pan cx, CXR and change Cipro to Cefepime 2g IV q 8 hours. Continue until count recovery  - Fludarabine , intermittent nausea, continue antiemetics   - Fludarabine , IVIG today    TBI today  - HPC transplant today. Continue transplant hydration for 24 hours post infusion of bone marrow   - monitor for CRS / haplo storm - if T >/= 102.5 on days +1 or +2, would dose tocilizumab 8mg / kg IVPB x 1    C.Diff negative, started imodium  - post transplant CTX 2.2 - continue CTX hydration for 24 hours post infusion of last dose , Tosilizumab x1 dose O/N for temp of 39.1.   - Start tacrolimus / MMF today     1. Infectious Disease:   acyclovir   Oral Tab/Cap 400 milliGRAM(s) Oral every 8 hours  cefepime   IVPB      cefepime   IVPB 2000 milliGRAM(s) IV Intermittent every 8 hours  clotrimazole Lozenge 1 Lozenge Oral five times a day  fluconAZOLE   Tablet 400 milliGRAM(s) Oral daily    2. VOD Prophylaxis: Actigall, Glutamine, Heparin (dosed at 100 units / kg / day)     3. GI Prophylaxis:    famotidine    Tablet 20 milliGRAM(s) Oral two times a day    4. Mouthcare - NS / NaHCO3 rinses, Mycelex, Biotene; Skin care     5. GVHD prophylaxis   TBI day -1   CTX days +3, +4   mycophenolate mofetil (Chemo) 1000 milliGRAM(s) Oral three times a day  tacrolimus (Chemo) 1 milliGRAM(s) Oral two times a day    6. Transfuse & replete electrolytes prn     7. IV hydration, daily weights, strict I&O, prn diuresis     8. PO intake as tolerated, nutrition follow up as needed, MVI, folic acid     9. Antiemetics, anti-diarrhea medications:   loperamide 2 milliGRAM(s) Oral every 4 hours PRN  prochlorperazine   Injectable 10 milliGRAM(s) IV Push every 6 hours PRN  ondansetron Injectable (Chemo) 8 milliGRAM(s) IV Push every 8 hours  aprepitant (Chemo) 80 milliGRAM(s) Oral every 24 hours    10. OOB as tolerated, physical therapy consult if needed     11. Monitor coags / fibrinogen 2x week, vitamin K as needed     12. Monitor closely for clinical changes, monitor for fevers     13. Emotional support provided, plan of care discussed and questions addressed     14. Patient education done regarding plan of care, restrictions and discharge planning     15. Continue regular social work input     I have written the above note for Dr. Wilder who performed service with me in the room.   Mila De Guzman NP-C ([phone])    I have seen and examined patient with NP, I agree with above note as scribed.                    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 () with Flu / Cy / TBI prep regimen for treatment of severe aplastic anemia    S: Patient seen and examined with HPC Transplant Team:       O: Vitals:   Vital Signs Last 24 Hrs  T(C): 36.9 (2023 05:58), Max: 37.2 (2023 19:12)  T(F): 98.4 (2023 05:58), Max: 99 (2023 19:12)  HR: 90 (2023 05:58) (88 - 104)  BP: 118/74 (2023 05:58) (104/64 - 135/69)  BP(mean): --  RR: 20 (2023 05:58) (18 - 20)  SpO2: 97% (2023 05:58) (97% - 100%)    Parameters below as of 2023 05:58  Patient On (Oxygen Delivery Method): room air      Admit weight: 102.8kg   Daily Weight in k (2023 09:25)  Today's weight: 98.6kg     Intake / Output:   12 @ 07:01  -  13 @ 07:00  --------------------------------------------------------  IN: 9354.8 mL / OUT: 6570 mL / NET: 2784.8 mL      PE:   Oropharynx: Clear  Oral Mucositis: (-)                                                        Grade:   CVS: RRR, +S1,S2  Lungs: CTA B/L  Abdomen: Soft, NT, ND, +BS  Extremities: No edema in BLE  Gastric Mucositis: (-)                                                 Grade:   Intestinal Mucositis:   (-)                                           Grade:   Skin: Intact  TLC: CDI  Neuro: Alert, Ox3  Pain: Denies      Labs:                         7.5    0.13  )-----------( 46       ( 2023 06:55 )             22.1     06-    137  |  107  |  8   ----------------------------<  84  4.4   |  21<L>  |  0.73    Ca    8.7      2023 06:55  Phos  3.6       Mg     2.1         TPro  6.1  /  Alb  3.7  /  TBili  0.7  /  DBili  x   /  AST  27  /  ALT  28  /  AlkPhos  68        Cultures:   Culture Results:   No growth to date. (06.10.23 @ 21:45)    Culture Results:   No growth to date. (06.10.23 @ 21:30)    Culture Results:   No growth to date. (06.10.23 @ 07:14)    Radiology:   ACC: [id number] EXAM:  XR CHEST PORTABLE URGENT 1V   ORDERED BY:  JACQUE ONEIL   PROCEDURE DATE:  06/10/2023    Impression:  Clear lungs.      Meds:   Antimicrobials:   acyclovir   Oral Tab/Cap 400 milliGRAM(s) Oral every 8 hours  cefepime   IVPB      cefepime   IVPB 2000 milliGRAM(s) IV Intermittent every 8 hours  clotrimazole Lozenge 1 Lozenge Oral five times a day  fluconAZOLE   Tablet 400 milliGRAM(s) Oral daily      Heme / Onc:   heparin  Infusion 424 Unit(s)/Hr IV Continuous <Continuous>      GI:  famotidine    Tablet 20 milliGRAM(s) Oral two times a day  loperamide 2 milliGRAM(s) Oral every 4 hours PRN  senna 1 Tablet(s) Oral at bedtime PRN  sodium bicarbonate Mouth Rinse 10 milliLiter(s) Swish and Spit five times a day  ursodiol Capsule 300 milliGRAM(s) Oral two times a day      Cardiovascular:       Immunologic:   filgrastim-sndz (ZARXIO) Injectable (Chemo) 480 MICROGram(s) SubCutaneous every 24 hours  mycophenolate mofetil (Chemo) 1000 milliGRAM(s) Oral three times a day  tacrolimus (Chemo) 1 milliGRAM(s) Oral two times a day      Other medications:   acetaminophen     Tablet .. 650 milliGRAM(s) Oral every 6 hours  aprepitant (Chemo) 80 milliGRAM(s) Oral every 24 hours  Biotene Dry Mouth Oral Rinse 5 milliLiter(s) Swish and Spit five times a day  Chemotherapy Worklist Order      chlorhexidine 4% Liquid 1 Application(s) Topical <User Schedule>  folic acid 1 milliGRAM(s) Oral daily  Infuse HPC Product      lidocaine/prilocaine Cream (Chemo) 1 Application(s) Topical daily  multivitamin 1 Tablet(s) Oral daily  ondansetron Injectable (Chemo) 8 milliGRAM(s) IV Push every 8 hours  potassium phosphate IVPB 15 milliMole(s) IV Intermittent once  sodium bicarbonate  Infusion (Chemo) 0.074 mEq/kG/Hr IV Continuous <Continuous>  sodium chloride 0.9% 1000 milliLiter(s) IV Continuous <Continuous>  sodium chloride 0.9%. 1000 milliLiter(s) IV Continuous <Continuous>  sodium chloride 0.9%. (Chemo) 1000 milliLiter(s) IV Continuous <Continuous>      PRN:   acetaminophen     Tablet .. 650 milliGRAM(s) Oral every 6 hours PRN  diphenhydrAMINE 25 milliGRAM(s) Oral every 12 hours PRN  loperamide 2 milliGRAM(s) Oral every 4 hours PRN  prochlorperazine   Injectable 10 milliGRAM(s) IV Push every 6 hours PRN  senna 1 Tablet(s) Oral at bedtime PRN  sodium chloride 0.9% lock flush 10 milliLiter(s) IV Push every 1 hour PRN    A/P:  37 year old male with severe aplastic anemia with multiple lines of treatment admitted for a haplo-identical bone marrow transplant with  Flu / Cy / TBI preparative regimen   Day + 5  start CTX hydration - 1/2NS @ 200ml /hr    Strict I&O, daily weights, prn diuresis   Day -6 - day -2 - Fludarabine 30mg/m2 IV  Day -6 - day - 5 - CTX 14.5mg/kg/day IV. CTX to start after urine SG < 1.010. [place]  12mg / kg - hemorrhagic cystitis ppx   Day -2 - IVIG 0.4 g/kg (ideal body weight) every 3 weeks. Premedication with Tylenol and benadryl   Day -1 -  cGy x 1 dose   Day - 1 - at 2200 begin transplant hydration : 0.45Ns + 1 amp (50mEq) sodium bicarbonate at 150ml /hr; continue transplant hydration for 24 hours post infusion    Day + 2 at 2200 - begin CTX hydration (0.45NS + 10 mEq KCl/ @150ml /m2  continue 24 hours post last dose of CTX   Day + 3 - + 4 - CTX 50mg/kg/day IV. Begin CTX when urine SG < 1.010. EKG daily. [place] for hemorraghic cystitis ppx.   Day + 5 - start Zarxio,  MMF and Tacrolimus. Check Tacrolimus levels on Monday and Thursday.   when ANC < 500, start Cipro 500mg po BID. If becomes febrile, pan cx, CXR and change Cipro to Cefepime 2g IV q 8 hours. Continue until count recovery  - Fludarabine , intermittent nausea, continue antiemetics   - Fludarabine , IVIG today    TBI today  - HPC transplant today. Continue transplant hydration for 24 hours post infusion of bone marrow   - monitor for CRS / haplo storm - if T >/= 102.5 on days +1 or +2, would dose tocilizumab 8mg / kg IVPB x 1    C.Diff negative, started imodium  - post transplant CTX 2.2 - continue CTX hydration for 24 hours post infusion of last dose , Tosilizumab x1 dose O/N for temp of 39.1.   - Start tacrolimus / MMF today     1. Infectious Disease:   acyclovir   Oral Tab/Cap 400 milliGRAM(s) Oral every 8 hours  cefepime   IVPB      cefepime   IVPB 2000 milliGRAM(s) IV Intermittent every 8 hours  clotrimazole Lozenge 1 Lozenge Oral five times a day  fluconAZOLE   Tablet 400 milliGRAM(s) Oral daily    2. VOD Prophylaxis: Actigall, Glutamine, Heparin (dosed at 100 units / kg / day)     3. GI Prophylaxis:    famotidine    Tablet 20 milliGRAM(s) Oral two times a day    4. Mouthcare - NS / NaHCO3 rinses, Mycelex, Biotene; Skin care     5. GVHD prophylaxis   TBI day -1   CTX days +3, +4   mycophenolate mofetil (Chemo) 1000 milliGRAM(s) Oral three times a day  tacrolimus (Chemo) 1 milliGRAM(s) Oral two times a day    6. Transfuse & replete electrolytes prn     7. IV hydration, daily weights, strict I&O, prn diuresis     8. PO intake as tolerated, nutrition follow up as needed, MVI, folic acid     9. Antiemetics, anti-diarrhea medications:   loperamide 2 milliGRAM(s) Oral every 4 hours PRN  prochlorperazine   Injectable 10 milliGRAM(s) IV Push every 6 hours PRN  ondansetron Injectable (Chemo) 8 milliGRAM(s) IV Push every 8 hours  aprepitant (Chemo) 80 milliGRAM(s) Oral every 24 hours    10. OOB as tolerated, physical therapy consult if needed     11. Monitor coags / fibrinogen 2x week, vitamin K as needed     12. Monitor closely for clinical changes, monitor for fevers     13. Emotional support provided, plan of care discussed and questions addressed     14. Patient education done regarding plan of care, restrictions and discharge planning     15. Continue regular social work input     I have written the above note for Dr. Wilder who performed service with me in the room.   Mila De Guzman NP-C ([phone])    I have seen and examined patient with NP, I agree with above note as scribed.                    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 () with Flu / Cy / TBI prep regimen for treatment of severe aplastic anemia    S: Patient seen and examined with HPC Transplant Team:   + fatigue  + loose stool     O: Vitals:   Vital Signs Last 24 Hrs  T(C): 36.9 (2023 05:58), Max: 37.2 (2023 19:12)  T(F): 98.4 (2023 05:58), Max: 99 (2023 19:12)  HR: 90 (2023 05:58) (88 - 104)  BP: 118/74 (2023 05:58) (104/64 - 135/69)  BP(mean): --  RR: 20 (2023 05:58) (18 - 20)  SpO2: 97% (2023 05:58) (97% - 100%)    Parameters below as of 2023 05:58  Patient On (Oxygen Delivery Method): room air      Admit weight: 102.8kg   Daily Weight in k (2023 09:25)  Today's weight: 98.6kg     Intake / Output:    @ 07:01  -  13 @ 07:00  --------------------------------------------------------  IN: 9354.8 mL / OUT: 6570 mL / NET: 2784.8 mL      PE:   Oropharynx: Clear  Oral Mucositis: (-)                                                        Grade:   CVS: RRR, +S1,S2  Lungs: CTA B/L  Abdomen: Soft, NT, ND, +BS  Extremities: No edema in BLE  Gastric Mucositis: (-)                                                 Grade:   Intestinal Mucositis:   (-)                                           Grade:   Skin: Intact  TLC: CDI  Neuro: Alert, Ox3  Pain: Denies      Labs:                         7.5    0.13  )-----------( 46       ( 2023 06:55 )             22.1     06-    137  |  107  |  8   ----------------------------<  84  4.4   |  21<L>  |  0.73    Ca    8.7      2023 06:55  Phos  3.6       Mg     2.1         TPro  6.1  /  Alb  3.7  /  TBili  0.7  /  DBili  x   /  AST  27  /  ALT  28  /  AlkPhos  68        Cultures:   Culture Results:   No growth to date. (06.10.23 @ 21:45)    Culture Results:   No growth to date. (06.10.23 @ 21:30)    Culture Results:   No growth to date. (06.10.23 @ 07:14)    Radiology:   ACC: [id number] EXAM:  XR CHEST PORTABLE URGENT 1V   ORDERED BY:  JACQUE ONEIL   PROCEDURE DATE:  06/10/2023    Impression:  Clear lungs.      Meds:   Antimicrobials:   acyclovir   Oral Tab/Cap 400 milliGRAM(s) Oral every 8 hours  cefepime   IVPB      cefepime   IVPB 2000 milliGRAM(s) IV Intermittent every 8 hours  clotrimazole Lozenge 1 Lozenge Oral five times a day  fluconAZOLE   Tablet 400 milliGRAM(s) Oral daily      Heme / Onc:   heparin  Infusion 424 Unit(s)/Hr IV Continuous <Continuous>      GI:  famotidine    Tablet 20 milliGRAM(s) Oral two times a day  loperamide 2 milliGRAM(s) Oral every 4 hours PRN  senna 1 Tablet(s) Oral at bedtime PRN  sodium bicarbonate Mouth Rinse 10 milliLiter(s) Swish and Spit five times a day  ursodiol Capsule 300 milliGRAM(s) Oral two times a day      Cardiovascular:       Immunologic:   filgrastim-sndz (ZARXIO) Injectable (Chemo) 480 MICROGram(s) SubCutaneous every 24 hours  mycophenolate mofetil (Chemo) 1000 milliGRAM(s) Oral three times a day  tacrolimus (Chemo) 1 milliGRAM(s) Oral two times a day      Other medications:   acetaminophen     Tablet .. 650 milliGRAM(s) Oral every 6 hours  aprepitant (Chemo) 80 milliGRAM(s) Oral every 24 hours  Biotene Dry Mouth Oral Rinse 5 milliLiter(s) Swish and Spit five times a day  Chemotherapy Worklist Order      chlorhexidine 4% Liquid 1 Application(s) Topical <User Schedule>  folic acid 1 milliGRAM(s) Oral daily  Infuse HPC Product      lidocaine/prilocaine Cream (Chemo) 1 Application(s) Topical daily  multivitamin 1 Tablet(s) Oral daily  ondansetron Injectable (Chemo) 8 milliGRAM(s) IV Push every 8 hours  potassium phosphate IVPB 15 milliMole(s) IV Intermittent once  sodium bicarbonate  Infusion (Chemo) 0.074 mEq/kG/Hr IV Continuous <Continuous>  sodium chloride 0.9% 1000 milliLiter(s) IV Continuous <Continuous>  sodium chloride 0.9%. 1000 milliLiter(s) IV Continuous <Continuous>  sodium chloride 0.9%. (Chemo) 1000 milliLiter(s) IV Continuous <Continuous>      PRN:   acetaminophen     Tablet .. 650 milliGRAM(s) Oral every 6 hours PRN  diphenhydrAMINE 25 milliGRAM(s) Oral every 12 hours PRN  loperamide 2 milliGRAM(s) Oral every 4 hours PRN  prochlorperazine   Injectable 10 milliGRAM(s) IV Push every 6 hours PRN  senna 1 Tablet(s) Oral at bedtime PRN  sodium chloride 0.9% lock flush 10 milliLiter(s) IV Push every 1 hour PRN    A/P:  37 year old male with severe aplastic anemia with multiple lines of treatment admitted for a haplo-identical bone marrow transplant with  Flu / Cy / TBI preparative regimen   Day + 5  start CTX hydration - 1/2NS @ 200ml /hr    Strict I&O, daily weights, prn diuresis   Day -6 - day -2 - Fludarabine 30mg/m2 IV  Day -6 - day - 5 - CTX 14.5mg/kg/day IV. CTX to start after urine SG < 1.010. [place]  12mg / kg - hemorrhagic cystitis ppx   Day -2 - IVIG 0.4 g/kg (ideal body weight) every 3 weeks. Premedication with Tylenol and benadryl   Day -1 -  cGy x 1 dose   Day - 1 - at 2200 begin transplant hydration : 0.45Ns + 1 amp (50mEq) sodium bicarbonate at 150ml /hr; continue transplant hydration for 24 hours post infusion    Day + 2 at 2200 - begin CTX hydration (0.45NS + 10 mEq KCl/ @150ml /m2  continue 24 hours post last dose of CTX   Day + 3 - + 4 - CTX 50mg/kg/day IV. Begin CTX when urine SG < 1.010. EKG daily. [place] for hemorraghic cystitis ppx.   Day + 5 - start Zarxio,  MMF and Tacrolimus. Check Tacrolimus levels on Monday and Thursday.   when ANC < 500, start Cipro 500mg po BID. If becomes febrile, pan cx, CXR and change Cipro to Cefepime 2g IV q 8 hours. Continue until count recovery  - Fludarabine , intermittent nausea, continue antiemetics   - Fludarabine , IVIG today    TBI today  - HPC transplant today. Continue transplant hydration for 24 hours post infusion of bone marrow   - monitor for CRS / haplo storm - if T >/= 102.5 on days +1 or +2, would dose tocilizumab 8mg / kg IVPB x 1    C.Diff negative, started imodium  - post transplant CTX 2.2 - continue CTX hydration for 24 hours post infusion of last dose , Tosilizumab x1 dose O/N for temp of 39.1.   - Start tacrolimus / MMF today     1. Infectious Disease:   acyclovir   Oral Tab/Cap 400 milliGRAM(s) Oral every 8 hours  cefepime   IVPB      cefepime   IVPB 2000 milliGRAM(s) IV Intermittent every 8 hours  clotrimazole Lozenge 1 Lozenge Oral five times a day  fluconAZOLE   Tablet 400 milliGRAM(s) Oral daily    2. VOD Prophylaxis: Actigall, Glutamine, Heparin (dosed at 100 units / kg / day)     3. GI Prophylaxis:    famotidine    Tablet 20 milliGRAM(s) Oral two times a day    4. Mouthcare - NS / NaHCO3 rinses, Mycelex, Biotene; Skin care     5. GVHD prophylaxis   TBI day -1   CTX days +3, +4   mycophenolate mofetil (Chemo) 1000 milliGRAM(s) Oral three times a day  tacrolimus (Chemo) 1 milliGRAM(s) Oral two times a day    6. Transfuse & replete electrolytes prn     7. IV hydration, daily weights, strict I&O, prn diuresis     8. PO intake as tolerated, nutrition follow up as needed, MVI, folic acid     9. Antiemetics, anti-diarrhea medications:   loperamide 2 milliGRAM(s) Oral every 4 hours PRN  prochlorperazine   Injectable 10 milliGRAM(s) IV Push every 6 hours PRN  ondansetron Injectable (Chemo) 8 milliGRAM(s) IV Push every 8 hours  aprepitant (Chemo) 80 milliGRAM(s) Oral every 24 hours    10. OOB as tolerated, physical therapy consult if needed     11. Monitor coags / fibrinogen 2x week, vitamin K as needed     12. Monitor closely for clinical changes, monitor for fevers     13. Emotional support provided, plan of care discussed and questions addressed     14. Patient education done regarding plan of care, restrictions and discharge planning     15. Continue regular social work input     I have written the above note for Dr. Wilder who performed service with me in the room.   Mila De Guzman NP-C ([phone])    I have seen and examined patient with NP, I agree with above note as scribed.                    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 () with Flu / Cy / TBI prep regimen for treatment of severe aplastic anemia    S: Patient seen and examined with HPC Transplant Team:   + fatigue  + loose stool   + intermittent nausea    O: Vitals:   Vital Signs Last 24 Hrs  T(C): 36.9 (2023 05:58), Max: 37.2 (2023 19:12)  T(F): 98.4 (2023 05:58), Max: 99 (2023 19:12)  HR: 90 (2023 05:58) (88 - 104)  BP: 118/74 (2023 05:58) (104/64 - 135/69)  BP(mean): --  RR: 20 (2023 05:58) (18 - 20)  SpO2: 97% (2023 05:58) (97% - 100%)    Parameters below as of 2023 05:58  Patient On (Oxygen Delivery Method): room air      Admit weight: 102.8kg   Daily Weight in k (2023 09:25)  Today's weight: 98.6kg     Intake / Output:   12 @ 07:01  -  -13 @ 07:00  --------------------------------------------------------  IN: 9354.8 mL / OUT: 6570 mL / NET: 2784.8 mL      PE:   Oropharynx: Clear  Oral Mucositis: (-)                                                        Grade:   CVS: RRR, +S1,S2  Lungs: CTA B/L  Abdomen: Soft, NT, ND, +BS  Extremities: No edema in BLE  Gastric Mucositis: (-)                                                 Grade:   Intestinal Mucositis:   (-)                                           Grade:   Skin: Intact  TLC: CDI  Neuro: Alert, Ox3  Pain: Denies      Labs:                         7.5    0.13  )-----------( 46       ( 2023 06:55 )             22.1     06-    137  |  107  |  8   ----------------------------<  84  4.4   |  21<L>  |  0.73    Ca    8.7      2023 06:55  Phos  3.6       Mg     2.1         TPro  6.1  /  Alb  3.7  /  TBili  0.7  /  DBili  x   /  AST  27  /  ALT  28  /  AlkPhos  68        Cultures:   Culture Results:   No growth to date. (06.10.23 @ 21:45)    Culture Results:   No growth to date. (06.10.23 @ 21:30)    Culture Results:   No growth to date. (06.10.23 @ 07:14)    Radiology:   ACC: [id number] EXAM:  XR CHEST PORTABLE URGENT 1V   ORDERED BY:  JACQUE ONEIL   PROCEDURE DATE:  06/10/2023    Impression:  Clear lungs.      Meds:   Antimicrobials:   acyclovir   Oral Tab/Cap 400 milliGRAM(s) Oral every 8 hours  cefepime   IVPB      cefepime   IVPB 2000 milliGRAM(s) IV Intermittent every 8 hours  clotrimazole Lozenge 1 Lozenge Oral five times a day  fluconAZOLE   Tablet 400 milliGRAM(s) Oral daily      Heme / Onc:   heparin  Infusion 424 Unit(s)/Hr IV Continuous <Continuous>      GI:  famotidine    Tablet 20 milliGRAM(s) Oral two times a day  loperamide 2 milliGRAM(s) Oral every 4 hours PRN  senna 1 Tablet(s) Oral at bedtime PRN  sodium bicarbonate Mouth Rinse 10 milliLiter(s) Swish and Spit five times a day  ursodiol Capsule 300 milliGRAM(s) Oral two times a day      Cardiovascular:       Immunologic:   filgrastim-sndz (ZARXIO) Injectable (Chemo) 480 MICROGram(s) SubCutaneous every 24 hours  mycophenolate mofetil (Chemo) 1000 milliGRAM(s) Oral three times a day  tacrolimus (Chemo) 1 milliGRAM(s) Oral two times a day      Other medications:   acetaminophen     Tablet .. 650 milliGRAM(s) Oral every 6 hours  aprepitant (Chemo) 80 milliGRAM(s) Oral every 24 hours  Biotene Dry Mouth Oral Rinse 5 milliLiter(s) Swish and Spit five times a day  Chemotherapy Worklist Order      chlorhexidine 4% Liquid 1 Application(s) Topical <User Schedule>  folic acid 1 milliGRAM(s) Oral daily  Infuse HPC Product      lidocaine/prilocaine Cream (Chemo) 1 Application(s) Topical daily  multivitamin 1 Tablet(s) Oral daily  ondansetron Injectable (Chemo) 8 milliGRAM(s) IV Push every 8 hours  potassium phosphate IVPB 15 milliMole(s) IV Intermittent once  sodium bicarbonate  Infusion (Chemo) 0.074 mEq/kG/Hr IV Continuous <Continuous>  sodium chloride 0.9% 1000 milliLiter(s) IV Continuous <Continuous>  sodium chloride 0.9%. 1000 milliLiter(s) IV Continuous <Continuous>  sodium chloride 0.9%. (Chemo) 1000 milliLiter(s) IV Continuous <Continuous>      PRN:   acetaminophen     Tablet .. 650 milliGRAM(s) Oral every 6 hours PRN  diphenhydrAMINE 25 milliGRAM(s) Oral every 12 hours PRN  loperamide 2 milliGRAM(s) Oral every 4 hours PRN  prochlorperazine   Injectable 10 milliGRAM(s) IV Push every 6 hours PRN  senna 1 Tablet(s) Oral at bedtime PRN  sodium chloride 0.9% lock flush 10 milliLiter(s) IV Push every 1 hour PRN    A/P:  37 year old male with severe aplastic anemia with multiple lines of treatment admitted for a haplo-identical bone marrow transplant with  Flu / Cy / TBI preparative regimen   Day + 5  start CTX hydration - 1/2NS @ 200ml /hr    Strict I&O, daily weights, prn diuresis   Day -6 - day -2 - Fludarabine 30mg/m2 IV  Day -6 - day - 5 - CTX 14.5mg/kg/day IV. CTX to start after urine SG < 1.010. [place]  12mg / kg - hemorrhagic cystitis ppx   Day -2 - IVIG 0.4 g/kg (ideal body weight) every 3 weeks. Premedication with Tylenol and benadryl   Day -1 -  cGy x 1 dose   Day - 1 - at 2200 begin transplant hydration : 0.45Ns + 1 amp (50mEq) sodium bicarbonate at 150ml /hr; continue transplant hydration for 24 hours post infusion    Day + 2 at 2200 - begin CTX hydration (0.45NS + 10 mEq KCl/ @150ml /m2  continue 24 hours post last dose of CTX   Day + 3 - + 4 - CTX 50mg/kg/day IV. Begin CTX when urine SG < 1.010. EKG daily. [place] for hemorraghic cystitis ppx.   Day + 5 - start Zarxio,  MMF and Tacrolimus. Check Tacrolimus levels on Monday and Thursday.   when ANC < 500, start Cipro 500mg po BID. If becomes febrile, pan cx, CXR and change Cipro to Cefepime 2g IV q 8 hours. Continue until count recovery  - Fludarabine , intermittent nausea, continue antiemetics   - Fludarabine , IVIG today    TBI today  - HPC transplant today. Continue transplant hydration for 24 hours post infusion of bone marrow   - monitor for CRS / haplo storm - if T >/= 102.5 on days +1 or +2, would dose tocilizumab 8mg / kg IVPB x 1    C.Diff negative, started imodium  - post transplant CTX 2.2 - continue CTX hydration for 24 hours post infusion of last dose , Tosilizumab x1 dose O/N for temp of 39.1.   - Start tacrolimus / MMF today     1. Infectious Disease:   acyclovir   Oral Tab/Cap 400 milliGRAM(s) Oral every 8 hours  cefepime   IVPB      cefepime   IVPB 2000 milliGRAM(s) IV Intermittent every 8 hours  clotrimazole Lozenge 1 Lozenge Oral five times a day  fluconAZOLE   Tablet 400 milliGRAM(s) Oral daily    2. VOD Prophylaxis: Actigall, Glutamine, Heparin (dosed at 100 units / kg / day)     3. GI Prophylaxis:    famotidine    Tablet 20 milliGRAM(s) Oral two times a day    4. Mouthcare - NS / NaHCO3 rinses, Mycelex, Biotene; Skin care     5. GVHD prophylaxis   TBI day -1   CTX days +3, +4   mycophenolate mofetil (Chemo) 1000 milliGRAM(s) Oral three times a day  tacrolimus (Chemo) 1 milliGRAM(s) Oral two times a day    6. Transfuse & replete electrolytes prn     7. IV hydration, daily weights, strict I&O, prn diuresis     8. PO intake as tolerated, nutrition follow up as needed, MVI, folic acid     9. Antiemetics, anti-diarrhea medications:   loperamide 2 milliGRAM(s) Oral every 4 hours PRN  prochlorperazine   Injectable 10 milliGRAM(s) IV Push every 6 hours PRN  ondansetron Injectable (Chemo) 8 milliGRAM(s) IV Push every 8 hours  aprepitant (Chemo) 80 milliGRAM(s) Oral every 24 hours    10. OOB as tolerated, physical therapy consult if needed     11. Monitor coags / fibrinogen 2x week, vitamin K as needed     12. Monitor closely for clinical changes, monitor for fevers     13. Emotional support provided, plan of care discussed and questions addressed     14. Patient education done regarding plan of care, restrictions and discharge planning     15. Continue regular social work input     I have written the above note for Dr. Wilder who performed service with me in the room.   Mila De Guzman NP-C ([phone])    I have seen and examined patient with NP, I agree with above note as scribed.

## 2023-06-13 NOTE — PROVIDER CONTACT NOTE (OTHER) - SITUATION
pt. is behind close to 1500cc
6hrs post cytoxan lab results were seen by provider and phosphorus is 2.3

## 2023-06-14 LAB
ALBUMIN SERPL ELPH-MCNC: 4.1 G/DL — SIGNIFICANT CHANGE UP (ref 3.3–5)
ALP SERPL-CCNC: 71 U/L — SIGNIFICANT CHANGE UP (ref 40–120)
ALT FLD-CCNC: 32 U/L — SIGNIFICANT CHANGE UP (ref 10–45)
ANION GAP SERPL CALC-SCNC: 13 MMOL/L — SIGNIFICANT CHANGE UP (ref 5–17)
AST SERPL-CCNC: 23 U/L — SIGNIFICANT CHANGE UP (ref 10–40)
BASOPHILS # BLD AUTO: 0 K/UL — SIGNIFICANT CHANGE UP (ref 0–0.2)
BASOPHILS NFR BLD AUTO: 0 % — SIGNIFICANT CHANGE UP (ref 0–2)
BILIRUB SERPL-MCNC: 0.7 MG/DL — SIGNIFICANT CHANGE UP (ref 0.2–1.2)
BLD GP AB SCN SERPL QL: NEGATIVE — SIGNIFICANT CHANGE UP
BUN SERPL-MCNC: 9 MG/DL — SIGNIFICANT CHANGE UP (ref 7–23)
CALCIUM SERPL-MCNC: 8.8 MG/DL — SIGNIFICANT CHANGE UP (ref 8.4–10.5)
CHLORIDE SERPL-SCNC: 102 MMOL/L — SIGNIFICANT CHANGE UP (ref 96–108)
CO2 SERPL-SCNC: 23 MMOL/L — SIGNIFICANT CHANGE UP (ref 22–31)
CREAT SERPL-MCNC: 0.64 MG/DL — SIGNIFICANT CHANGE UP (ref 0.5–1.3)
EGFR: 127 ML/MIN/1.73M2 — SIGNIFICANT CHANGE UP
EOSINOPHIL # BLD AUTO: 0.03 K/UL — SIGNIFICANT CHANGE UP (ref 0–0.5)
EOSINOPHIL NFR BLD AUTO: 5.9 % — SIGNIFICANT CHANGE UP (ref 0–6)
GLUCOSE SERPL-MCNC: 89 MG/DL — SIGNIFICANT CHANGE UP (ref 70–99)
HCT VFR BLD CALC: 22.4 % — LOW (ref 39–50)
HGB BLD-MCNC: 7.8 G/DL — LOW (ref 13–17)
LDH SERPL L TO P-CCNC: 375 U/L — HIGH (ref 50–242)
LYMPHOCYTES # BLD AUTO: 0.04 K/UL — LOW (ref 1–3.3)
LYMPHOCYTES # BLD AUTO: 7.8 % — LOW (ref 13–44)
MAGNESIUM SERPL-MCNC: 2.1 MG/DL — SIGNIFICANT CHANGE UP (ref 1.6–2.6)
MANUAL SMEAR VERIFICATION: SIGNIFICANT CHANGE UP
MCHC RBC-ENTMCNC: 32.2 PG — SIGNIFICANT CHANGE UP (ref 27–34)
MCHC RBC-ENTMCNC: 34.8 GM/DL — SIGNIFICANT CHANGE UP (ref 32–36)
MCV RBC AUTO: 92.6 FL — SIGNIFICANT CHANGE UP (ref 80–100)
MONOCYTES # BLD AUTO: 0 K/UL — SIGNIFICANT CHANGE UP (ref 0–0.9)
MONOCYTES NFR BLD AUTO: 0 % — LOW (ref 2–14)
NEUTROPHILS # BLD AUTO: 0.48 K/UL — LOW (ref 1.8–7.4)
NEUTROPHILS NFR BLD AUTO: 70.6 % — SIGNIFICANT CHANGE UP (ref 43–77)
NEUTS BAND # BLD: 15.7 % — HIGH (ref 0–8)
NRBC # BLD: 2 /100 — HIGH (ref 0–0)
PHOSPHATE SERPL-MCNC: 2.8 MG/DL — SIGNIFICANT CHANGE UP (ref 2.5–4.5)
PLAT MORPH BLD: NORMAL — SIGNIFICANT CHANGE UP
PLATELET # BLD AUTO: 28 K/UL — LOW (ref 150–400)
POTASSIUM SERPL-MCNC: 4.2 MMOL/L — SIGNIFICANT CHANGE UP (ref 3.5–5.3)
POTASSIUM SERPL-SCNC: 4.2 MMOL/L — SIGNIFICANT CHANGE UP (ref 3.5–5.3)
PROT SERPL-MCNC: 6.6 G/DL — SIGNIFICANT CHANGE UP (ref 6–8.3)
RBC # BLD: 2.42 M/UL — LOW (ref 4.2–5.8)
RBC # FLD: 14.8 % — HIGH (ref 10.3–14.5)
RBC BLD AUTO: SIGNIFICANT CHANGE UP
RH IG SCN BLD-IMP: POSITIVE — SIGNIFICANT CHANGE UP
SODIUM SERPL-SCNC: 138 MMOL/L — SIGNIFICANT CHANGE UP (ref 135–145)
WBC # BLD: 0.56 K/UL — CRITICAL LOW (ref 3.8–10.5)
WBC # FLD AUTO: 0.56 K/UL — CRITICAL LOW (ref 3.8–10.5)

## 2023-06-14 PROCEDURE — ZZZZZ: CPT

## 2023-06-14 RX ORDER — ONDANSETRON 8 MG/1
8 TABLET, FILM COATED ORAL EVERY 8 HOURS
Refills: 0 | Status: DISCONTINUED | OUTPATIENT
Start: 2023-06-14 | End: 2023-06-15

## 2023-06-14 RX ADMIN — Medication 10 MILLIGRAM(S): at 09:10

## 2023-06-14 RX ADMIN — Medication 1 MILLIGRAM(S): at 12:58

## 2023-06-14 RX ADMIN — MYCOPHENOLATE MOFETIL 1000 MILLIGRAM(S): 250 CAPSULE ORAL at 05:25

## 2023-06-14 RX ADMIN — Medication 1 LOZENGE: at 20:16

## 2023-06-14 RX ADMIN — Medication 10 MILLILITER(S): at 08:22

## 2023-06-14 RX ADMIN — Medication 10 MILLILITER(S): at 16:32

## 2023-06-14 RX ADMIN — CEFEPIME 100 MILLIGRAM(S): 1 INJECTION, POWDER, FOR SOLUTION INTRAMUSCULAR; INTRAVENOUS at 05:24

## 2023-06-14 RX ADMIN — Medication 5 MILLILITER(S): at 11:38

## 2023-06-14 RX ADMIN — CEFEPIME 100 MILLIGRAM(S): 1 INJECTION, POWDER, FOR SOLUTION INTRAMUSCULAR; INTRAVENOUS at 21:37

## 2023-06-14 RX ADMIN — MYCOPHENOLATE MOFETIL 1000 MILLIGRAM(S): 250 CAPSULE ORAL at 12:58

## 2023-06-14 RX ADMIN — Medication 400 MILLIGRAM(S): at 05:24

## 2023-06-14 RX ADMIN — Medication 400 MILLIGRAM(S): at 21:36

## 2023-06-14 RX ADMIN — Medication 5 MILLILITER(S): at 20:17

## 2023-06-14 RX ADMIN — FLUCONAZOLE 400 MILLIGRAM(S): 150 TABLET ORAL at 12:58

## 2023-06-14 RX ADMIN — Medication 1 LOZENGE: at 16:32

## 2023-06-14 RX ADMIN — Medication 10 MILLILITER(S): at 20:16

## 2023-06-14 RX ADMIN — URSODIOL 300 MILLIGRAM(S): 250 TABLET, FILM COATED ORAL at 05:25

## 2023-06-14 RX ADMIN — FAMOTIDINE 20 MILLIGRAM(S): 10 INJECTION INTRAVENOUS at 17:11

## 2023-06-14 RX ADMIN — Medication 5 MILLILITER(S): at 16:33

## 2023-06-14 RX ADMIN — TACROLIMUS 1 MILLIGRAM(S): 5 CAPSULE ORAL at 05:25

## 2023-06-14 RX ADMIN — ONDANSETRON 8 MILLIGRAM(S): 8 TABLET, FILM COATED ORAL at 05:24

## 2023-06-14 RX ADMIN — Medication 5 MILLILITER(S): at 08:22

## 2023-06-14 RX ADMIN — CEFEPIME 100 MILLIGRAM(S): 1 INJECTION, POWDER, FOR SOLUTION INTRAMUSCULAR; INTRAVENOUS at 13:00

## 2023-06-14 RX ADMIN — ONDANSETRON 8 MILLIGRAM(S): 8 TABLET, FILM COATED ORAL at 13:00

## 2023-06-14 RX ADMIN — CHLORHEXIDINE GLUCONATE 1 APPLICATION(S): 213 SOLUTION TOPICAL at 08:24

## 2023-06-14 RX ADMIN — Medication 1 LOZENGE: at 08:22

## 2023-06-14 RX ADMIN — Medication 10 MILLILITER(S): at 11:39

## 2023-06-14 RX ADMIN — Medication 1 LOZENGE: at 11:39

## 2023-06-14 RX ADMIN — FAMOTIDINE 20 MILLIGRAM(S): 10 INJECTION INTRAVENOUS at 05:25

## 2023-06-14 RX ADMIN — TACROLIMUS 1 MILLIGRAM(S): 5 CAPSULE ORAL at 17:10

## 2023-06-14 RX ADMIN — Medication 480 MICROGRAM(S): at 12:58

## 2023-06-14 RX ADMIN — Medication 1 TABLET(S): at 12:59

## 2023-06-14 RX ADMIN — LIDOCAINE AND PRILOCAINE CREAM 1 APPLICATION(S): 25; 25 CREAM TOPICAL at 13:10

## 2023-06-14 RX ADMIN — URSODIOL 300 MILLIGRAM(S): 250 TABLET, FILM COATED ORAL at 17:11

## 2023-06-14 RX ADMIN — MYCOPHENOLATE MOFETIL 1000 MILLIGRAM(S): 250 CAPSULE ORAL at 21:37

## 2023-06-14 RX ADMIN — Medication 400 MILLIGRAM(S): at 12:58

## 2023-06-14 NOTE — PROGRESS NOTE ADULT - SUBJECTIVE AND OBJECTIVE BOX
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 () with Flu / Cy / TBI prep regimen for treatment of severe aplastic anemia    S: Patient seen and examined with HPC Transplant Team:       O: Vitals:   Vital Signs Last 24 Hrs  T(C): 36.7 (2023 05:35), Max: 36.8 (2023 19:28)  T(F): 98.1 (2023 05:35), Max: 98.2 (2023 19:28)  HR: 81 (2023 05:35) (81 - 104)  BP: 105/68 (2023 05:35) (105/68 - 128/75)  BP(mean): --  RR: 18 (2023 05:35) (18 - 20)  SpO2: 97% (2023 05:35) (95% - 100%)    Parameters below as of 2023 05:35  Patient On (Oxygen Delivery Method): room air    Admit weight: 102.8kg   Daily Weight in k.6 (2023 09:18)  Today's weight:     Intake / Output:   06-13 @ 07:01  -  14 @ 07:00  --------------------------------------------------------  IN: 4721.5 mL / OUT: 5120 mL / NET: -398.5 mL      PE:   Oropharynx: Clear  Oral Mucositis: (-)                                                        Grade:   CVS: RRR, +S1,S2  Lungs: CTA B/L  Abdomen: Soft, NT, ND, +BS  Extremities: No edema in BLE  Gastric Mucositis: (-)                                                 Grade:   Intestinal Mucositis:   (-)                                           Grade:   Skin: Intact  TLC: CDI  Neuro: Alert, Ox3  Pain: Denies      Labs:     Cultures:   Culture Results:   No growth to date. (06.10.23 @ 21:45)    Culture Results:   No growth to date. (06.10.23 @ 21:30)    Culture Results:   No growth to date. (06.10.23 @ 07:14)    Radiology:   ACC: [id number] EXAM:  XR CHEST PORTABLE URGENT 1V   ORDERED BY:  JACQUE ONEIL   PROCEDURE DATE:  06/10/2023    Impression:  Clear lungs.    Meds:   Antimicrobials:   acyclovir   Oral Tab/Cap 400 milliGRAM(s) Oral every 8 hours  cefepime   IVPB      cefepime   IVPB 2000 milliGRAM(s) IV Intermittent every 8 hours  clotrimazole Lozenge 1 Lozenge Oral five times a day  fluconAZOLE   Tablet 400 milliGRAM(s) Oral daily      Heme / Onc:   heparin  Infusion 424 Unit(s)/Hr IV Continuous <Continuous>      GI:  famotidine    Tablet 20 milliGRAM(s) Oral two times a day  loperamide 2 milliGRAM(s) Oral every 4 hours PRN  senna 1 Tablet(s) Oral at bedtime PRN  sodium bicarbonate Mouth Rinse 10 milliLiter(s) Swish and Spit five times a day  ursodiol Capsule 300 milliGRAM(s) Oral two times a day      Cardiovascular:       Immunologic:   filgrastim-sndz (ZARXIO) Injectable (Chemo) 480 MICROGram(s) SubCutaneous every 24 hours  mycophenolate mofetil (Chemo) 1000 milliGRAM(s) Oral three times a day  tacrolimus (Chemo) 1 milliGRAM(s) Oral two times a day      Other medications:   acetaminophen     Tablet .. 650 milliGRAM(s) Oral every 6 hours  Biotene Dry Mouth Oral Rinse 5 milliLiter(s) Swish and Spit five times a day  Chemotherapy Worklist Order      chlorhexidine 4% Liquid 1 Application(s) Topical <User Schedule>  folic acid 1 milliGRAM(s) Oral daily  Infuse HPC Product      lidocaine/prilocaine Cream (Chemo) 1 Application(s) Topical daily  multivitamin 1 Tablet(s) Oral daily  sodium bicarbonate  Infusion (Chemo) 0.074 mEq/kG/Hr IV Continuous <Continuous>  sodium chloride 0.9% 1000 milliLiter(s) IV Continuous <Continuous>  sodium chloride 0.9%. 1000 milliLiter(s) IV Continuous <Continuous>  sodium chloride 0.9%. (Chemo) 1000 milliLiter(s) IV Continuous <Continuous>      PRN:   acetaminophen     Tablet .. 650 milliGRAM(s) Oral every 6 hours PRN  diphenhydrAMINE 25 milliGRAM(s) Oral every 12 hours PRN  loperamide 2 milliGRAM(s) Oral every 4 hours PRN  prochlorperazine   Injectable 10 milliGRAM(s) IV Push every 6 hours PRN  senna 1 Tablet(s) Oral at bedtime PRN  sodium chloride 0.9% lock flush 10 milliLiter(s) IV Push every 1 hour PRN    A/P:  37 year old male with severe aplastic anemia with multiple lines of treatment admitted for a haplo-identical bone marrow transplant with  Flu / Cy / TBI preparative regimen   Day + 6  start CTX hydration - 1/2NS @ 200ml /hr    Strict I&O, daily weights, prn diuresis   Day -6 - day -2 - Fludarabine 30mg/m2 IV  Day -6 - day - 5 - CTX 14.5mg/kg/day IV. CTX to start after urine SG < 1.010. Mesna  12mg / kg - hemorrhagic cystitis ppx   Day -2 - IVIG 0.4 g/kg (ideal body weight) every 3 weeks. Premedication with Tylenol and benadryl   Day -1 -  cGy x 1 dose   Day - 1 - at 2200 begin transplant hydration : 0.45Ns + 1 amp (50mEq) sodium bicarbonate at 150ml /hr; continue transplant hydration for 24 hours post infusion    Day + 2 at 2200 - begin CTX hydration (0.45NS + 10 mEq KCl/ @150ml /m2  continue 24 hours post last dose of CTX   Day + 3 - + 4 - CTX 50mg/kg/day IV. Begin CTX when urine SG < 1.010. EKG daily. Mesna for hemorraghic cystitis ppx.   Day + 5 - start Zarxio,  MMF and Tacrolimus. Check Tacrolimus levels on Monday and Thursday.   when ANC < 500, start Cipro 500mg po BID. If becomes febrile, pan cx, CXR and change Cipro to Cefepime 2g IV q 8 hours. Continue until count recovery  - Fludarabine , intermittent nausea, continue antiemetics   - Fludarabine , IVIG today    TBI today  - HPC transplant today. Continue transplant hydration for 24 hours post infusion of bone marrow   - monitor for CRS / haplo storm - if T >/= 102.5 on days +1 or +2, would dose tocilizumab 8mg / kg IVPB x 1   6/11 C.Diff negative, started imodium  - post transplant CTX 2.2 - continue CTX hydration for 24 hours post infusion of last dose , Tosilizumab x1 dose O/N for temp of 39.1.   - Start tacrolimus / MMF today     1. Infectious Disease:   acyclovir   Oral Tab/Cap 400 milliGRAM(s) Oral every 8 hours  cefepime   IVPB      cefepime   IVPB 2000 milliGRAM(s) IV Intermittent every 8 hours  clotrimazole Lozenge 1 Lozenge Oral five times a day  fluconAZOLE   Tablet 400 milliGRAM(s) Oral daily    2. VOD Prophylaxis: Actigall, Glutamine, Heparin (dosed at 100 units / kg / day)     3. GI Prophylaxis:    famotidine    Tablet 20 milliGRAM(s) Oral two times a day    4. Mouthcare - NS / NaHCO3 rinses, Mycelex, Biotene; Skin care     5. GVHD prophylaxis   TBI day -1   CTX days +3, +4   mycophenolate mofetil (Chemo) 1000 milliGRAM(s) Oral three times a day  tacrolimus (Chemo) 1 milliGRAM(s) Oral two times a day    6. Transfuse & replete electrolytes prn     7. IV hydration, daily weights, strict I&O, prn diuresis     8. PO intake as tolerated, nutrition follow up as needed, MVI, folic acid     9. Antiemetics, anti-diarrhea medications:   loperamide 2 milliGRAM(s) Oral every 4 hours PRN  prochlorperazine   Injectable 10 milliGRAM(s) IV Push every 6 hours PRN    10. OOB as tolerated, physical therapy consult if needed     11. Monitor coags / fibrinogen 2x week, vitamin K as needed     12. Monitor closely for clinical changes, monitor for fevers     13. Emotional support provided, plan of care discussed and questions addressed     14. Patient education done regarding plan of care, restrictions and discharge planning     15. Continue regular social work input     I have written the above note for Dr. Wilder who performed service with me in the room.   Mila De Guzman NP-C ([phone])    I have seen and examined patient with NP, I agree with above note as scribed.                    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 () with Flu / Cy / TBI prep regimen for treatment of severe aplastic anemia    S: Patient seen and examined with HPC Transplant Team:       O: Vitals:   Vital Signs Last 24 Hrs  T(C): 36.7 (2023 05:35), Max: 36.8 (2023 19:28)  T(F): 98.1 (2023 05:35), Max: 98.2 (2023 19:28)  HR: 81 (2023 05:35) (81 - 104)  BP: 105/68 (2023 05:35) (105/68 - 128/75)  BP(mean): --  RR: 18 (2023 05:35) (18 - 20)  SpO2: 97% (2023 05:35) (95% - 100%)    Parameters below as of 2023 05:35  Patient On (Oxygen Delivery Method): room air    Admit weight: 102.8kg   Daily Weight in k.6 (2023 09:18)  Today's weight:     Intake / Output:   06-13 @ 07:01  -  06-14 @ 07:00  --------------------------------------------------------  IN: 4721.5 mL / OUT: 5120 mL / NET: -398.5 mL      PE:   Oropharynx: Clear  Oral Mucositis: (-)                                                        Grade:   CVS: RRR, +S1,S2  Lungs: CTA B/L  Abdomen: Soft, NT, ND, +BS  Extremities: No edema in BLE  Gastric Mucositis: (-)                                                 Grade:   Intestinal Mucositis:   (-)                                           Grade:   Skin: Intact  TLC: CDI  Neuro: Alert, Ox3  Pain: Denies      Labs:                         7.8    0.56  )-----------( 28       ( 2023 07:06 )             22.4         06-14    138  |  102  |  9   ----------------------------<  89  4.2   |  23  |  0.64    Ca    8.8      2023 07:06  Phos  2.8       Mg     2.1         TPro  6.6  /  Alb  4.1  /  TBili  0.7  /  DBili  x   /  AST  23  /  ALT  32  /  AlkPhos  71    Cultures:   Culture Results:   No growth to date. (06.10.23 @ 21:45)    Culture Results:   No growth to date. (06.10.23 @ 21:30)    Culture Results:   No growth to date. (06.10.23 @ 07:14)    Radiology:   ACC: [id number] EXAM:  XR CHEST PORTABLE URGENT 1V   ORDERED BY:  JACQUE ONEIL   PROCEDURE DATE:  06/10/2023    Impression:  Clear lungs.    Meds:   Antimicrobials:   acyclovir   Oral Tab/Cap 400 milliGRAM(s) Oral every 8 hours  cefepime   IVPB      cefepime   IVPB 2000 milliGRAM(s) IV Intermittent every 8 hours  clotrimazole Lozenge 1 Lozenge Oral five times a day  fluconAZOLE   Tablet 400 milliGRAM(s) Oral daily      Heme / Onc:   heparin  Infusion 424 Unit(s)/Hr IV Continuous <Continuous>      GI:  famotidine    Tablet 20 milliGRAM(s) Oral two times a day  loperamide 2 milliGRAM(s) Oral every 4 hours PRN  senna 1 Tablet(s) Oral at bedtime PRN  sodium bicarbonate Mouth Rinse 10 milliLiter(s) Swish and Spit five times a day  ursodiol Capsule 300 milliGRAM(s) Oral two times a day      Cardiovascular:       Immunologic:   filgrastim-sndz (ZARXIO) Injectable (Chemo) 480 MICROGram(s) SubCutaneous every 24 hours  mycophenolate mofetil (Chemo) 1000 milliGRAM(s) Oral three times a day  tacrolimus (Chemo) 1 milliGRAM(s) Oral two times a day      Other medications:   acetaminophen     Tablet .. 650 milliGRAM(s) Oral every 6 hours  Biotene Dry Mouth Oral Rinse 5 milliLiter(s) Swish and Spit five times a day  Chemotherapy Worklist Order      chlorhexidine 4% Liquid 1 Application(s) Topical <User Schedule>  folic acid 1 milliGRAM(s) Oral daily  Infuse HPC Product      lidocaine/prilocaine Cream (Chemo) 1 Application(s) Topical daily  multivitamin 1 Tablet(s) Oral daily  sodium bicarbonate  Infusion (Chemo) 0.074 mEq/kG/Hr IV Continuous <Continuous>  sodium chloride 0.9% 1000 milliLiter(s) IV Continuous <Continuous>  sodium chloride 0.9%. 1000 milliLiter(s) IV Continuous <Continuous>  sodium chloride 0.9%. (Chemo) 1000 milliLiter(s) IV Continuous <Continuous>      PRN:   acetaminophen     Tablet .. 650 milliGRAM(s) Oral every 6 hours PRN  diphenhydrAMINE 25 milliGRAM(s) Oral every 12 hours PRN  loperamide 2 milliGRAM(s) Oral every 4 hours PRN  prochlorperazine   Injectable 10 milliGRAM(s) IV Push every 6 hours PRN  senna 1 Tablet(s) Oral at bedtime PRN  sodium chloride 0.9% lock flush 10 milliLiter(s) IV Push every 1 hour PRN    A/P:  37 year old male with severe aplastic anemia with multiple lines of treatment admitted for a haplo-identical bone marrow transplant with  Flu / Cy / TBI preparative regimen   Day + 6  start CTX hydration - 1/2NS @ 200ml /hr    Strict I&O, daily weights, prn diuresis   Day -6 - day -2 - Fludarabine 30mg/m2 IV  Day -6 - day - 5 - CTX 14.5mg/kg/day IV. CTX to start after urine SG < 1.010. Mesna  12mg / kg - hemorrhagic cystitis ppx   Day -2 - IVIG 0.4 g/kg (ideal body weight) every 3 weeks. Premedication with Tylenol and benadryl   Day -1 -  cGy x 1 dose   Day - 1 - at 2200 begin transplant hydration : 0.45Ns + 1 amp (50mEq) sodium bicarbonate at 150ml /hr; continue transplant hydration for 24 hours post infusion    Day + 2 at 2200 - begin CTX hydration (0.45NS + 10 mEq KCl/ @150ml /m2  continue 24 hours post last dose of CTX   Day + 3 - + 4 - CTX 50mg/kg/day IV. Begin CTX when urine SG < 1.010. EKG daily. Mesna for hemorraghic cystitis ppx.   Day + 5 - start Zarxio,  MMF and Tacrolimus. Check Tacrolimus levels on Monday and Thursday.   when ANC < 500, start Cipro 500mg po BID. If becomes febrile, pan cx, CXR and change Cipro to Cefepime 2g IV q 8 hours. Continue until count recovery  - Fludarabine , intermittent nausea, continue antiemetics   - Fludarabine , IVIG today    TBI today  - HPC transplant today. Continue transplant hydration for 24 hours post infusion of bone marrow   - monitor for CRS / haplo storm - if T >/= 102.5 on days +1 or +2, would dose tocilizumab 8mg / kg IVPB x 1    C.Diff negative, started imodium  - post transplant CTX 2.2 - continue CTX hydration for 24 hours post infusion of last dose , Tosilizumab x1 dose O/N for temp of 39.1.   - Start tacrolimus / MMF today     1. Infectious Disease:   acyclovir   Oral Tab/Cap 400 milliGRAM(s) Oral every 8 hours  cefepime   IVPB      cefepime   IVPB 2000 milliGRAM(s) IV Intermittent every 8 hours  clotrimazole Lozenge 1 Lozenge Oral five times a day  fluconAZOLE   Tablet 400 milliGRAM(s) Oral daily    2. VOD Prophylaxis: Actigall, Glutamine, Heparin (dosed at 100 units / kg / day)     3. GI Prophylaxis:    famotidine    Tablet 20 milliGRAM(s) Oral two times a day    4. Mouthcare - NS / NaHCO3 rinses, Mycelex, Biotene; Skin care     5. GVHD prophylaxis   TBI day -1   CTX days +3, +4   mycophenolate mofetil (Chemo) 1000 milliGRAM(s) Oral three times a day  tacrolimus (Chemo) 1 milliGRAM(s) Oral two times a day    6. Transfuse & replete electrolytes prn     7. IV hydration, daily weights, strict I&O, prn diuresis     8. PO intake as tolerated, nutrition follow up as needed, MVI, folic acid     9. Antiemetics, anti-diarrhea medications:   loperamide 2 milliGRAM(s) Oral every 4 hours PRN  prochlorperazine   Injectable 10 milliGRAM(s) IV Push every 6 hours PRN    10. OOB as tolerated, physical therapy consult if needed     11. Monitor coags / fibrinogen 2x week, vitamin K as needed     12. Monitor closely for clinical changes, monitor for fevers     13. Emotional support provided, plan of care discussed and questions addressed     14. Patient education done regarding plan of care, restrictions and discharge planning     15. Continue regular social work input     I have written the above note for Dr. Wilder who performed service with me in the room.   Mila De Guzman NP-C ([phone])    I have seen and examined patient with NP, I agree with above note as scribed.                    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 () with Flu / Cy / TBI prep regimen for treatment of severe aplastic anemia    S: Patient seen and examined with HPC Transplant Team:       O: Vitals:   Vital Signs Last 24 Hrs  T(C): 36.7 (2023 05:35), Max: 36.8 (2023 19:28)  T(F): 98.1 (2023 05:35), Max: 98.2 (2023 19:28)  HR: 81 (2023 05:35) (81 - 104)  BP: 105/68 (2023 05:35) (105/68 - 128/75)  BP(mean): --  RR: 18 (2023 05:35) (18 - 20)  SpO2: 97% (2023 05:35) (95% - 100%)    Parameters below as of 2023 05:35  Patient On (Oxygen Delivery Method): room air    Admit weight: 102.8kg   Daily Weight in k.6 (2023 09:18)  Today's weight: 97.4kg     Intake / Output:   -13 @ 07:01  -  -14 @ 07:00  --------------------------------------------------------  IN: 4721.5 mL / OUT: 5120 mL / NET: -398.5 mL      PE:   Oropharynx: Clear  Oral Mucositis: (-)                                                        Grade:   CVS: RRR, +S1,S2  Lungs: CTA B/L  Abdomen: Soft, NT, ND, +BS  Extremities: No edema in BLE  Gastric Mucositis: (-)                                                 Grade:   Intestinal Mucositis:   (-)                                           Grade:   Skin: Intact  TLC: CDI  Neuro: Alert, Ox3  Pain: Denies      Labs:                         7.8    0.56  )-----------( 28       ( 2023 07:06 )             22.4         06-14    138  |  102  |  9   ----------------------------<  89  4.2   |  23  |  0.64    Ca    8.8      2023 07:06  Phos  2.8       Mg     2.1         TPro  6.6  /  Alb  4.1  /  TBili  0.7  /  DBili  x   /  AST  23  /  ALT  32  /  AlkPhos  71    Cultures:   Culture Results:   No growth to date. (06.10.23 @ 21:45)    Culture Results:   No growth to date. (06.10.23 @ 21:30)    Culture Results:   No growth to date. (06.10.23 @ 07:14)    Radiology:   ACC: [id number] EXAM:  XR CHEST PORTABLE URGENT 1V   ORDERED BY:  JACQUE ONEIL   PROCEDURE DATE:  06/10/2023    Impression:  Clear lungs.    Meds:   Antimicrobials:   acyclovir   Oral Tab/Cap 400 milliGRAM(s) Oral every 8 hours  cefepime   IVPB      cefepime   IVPB 2000 milliGRAM(s) IV Intermittent every 8 hours  clotrimazole Lozenge 1 Lozenge Oral five times a day  fluconAZOLE   Tablet 400 milliGRAM(s) Oral daily      Heme / Onc:   heparin  Infusion 424 Unit(s)/Hr IV Continuous <Continuous>      GI:  famotidine    Tablet 20 milliGRAM(s) Oral two times a day  loperamide 2 milliGRAM(s) Oral every 4 hours PRN  senna 1 Tablet(s) Oral at bedtime PRN  sodium bicarbonate Mouth Rinse 10 milliLiter(s) Swish and Spit five times a day  ursodiol Capsule 300 milliGRAM(s) Oral two times a day      Cardiovascular:       Immunologic:   filgrastim-sndz (ZARXIO) Injectable (Chemo) 480 MICROGram(s) SubCutaneous every 24 hours  mycophenolate mofetil (Chemo) 1000 milliGRAM(s) Oral three times a day  tacrolimus (Chemo) 1 milliGRAM(s) Oral two times a day      Other medications:   acetaminophen     Tablet .. 650 milliGRAM(s) Oral every 6 hours  Biotene Dry Mouth Oral Rinse 5 milliLiter(s) Swish and Spit five times a day  Chemotherapy Worklist Order      chlorhexidine 4% Liquid 1 Application(s) Topical <User Schedule>  folic acid 1 milliGRAM(s) Oral daily  Infuse HPC Product      lidocaine/prilocaine Cream (Chemo) 1 Application(s) Topical daily  multivitamin 1 Tablet(s) Oral daily  sodium bicarbonate  Infusion (Chemo) 0.074 mEq/kG/Hr IV Continuous <Continuous>  sodium chloride 0.9% 1000 milliLiter(s) IV Continuous <Continuous>  sodium chloride 0.9%. 1000 milliLiter(s) IV Continuous <Continuous>  sodium chloride 0.9%. (Chemo) 1000 milliLiter(s) IV Continuous <Continuous>      PRN:   acetaminophen     Tablet .. 650 milliGRAM(s) Oral every 6 hours PRN  diphenhydrAMINE 25 milliGRAM(s) Oral every 12 hours PRN  loperamide 2 milliGRAM(s) Oral every 4 hours PRN  prochlorperazine   Injectable 10 milliGRAM(s) IV Push every 6 hours PRN  senna 1 Tablet(s) Oral at bedtime PRN  sodium chloride 0.9% lock flush 10 milliLiter(s) IV Push every 1 hour PRN    A/P:  37 year old male with severe aplastic anemia with multiple lines of treatment admitted for a haplo-identical bone marrow transplant with  Flu / Cy / TBI preparative regimen   Day + 6  start CTX hydration - 1/2NS @ 200ml /hr    Strict I&O, daily weights, prn diuresis   Day -6 - day -2 - Fludarabine 30mg/m2 IV  Day -6 - day - 5 - CTX 14.5mg/kg/day IV. CTX to start after urine SG < 1.010. Mesna  12mg / kg - hemorrhagic cystitis ppx   Day -2 - IVIG 0.4 g/kg (ideal body weight) every 3 weeks. Premedication with Tylenol and benadryl   Day -1 -  cGy x 1 dose   Day - 1 - at 2200 begin transplant hydration : 0.45Ns + 1 amp (50mEq) sodium bicarbonate at 150ml /hr; continue transplant hydration for 24 hours post infusion    Day + 2 at 2200 - begin CTX hydration (0.45NS + 10 mEq KCl/ @150ml /m2  continue 24 hours post last dose of CTX   Day + 3 - + 4 - CTX 50mg/kg/day IV. Begin CTX when urine SG < 1.010. EKG daily. Mesna for hemorraghic cystitis ppx.   Day + 5 - start Zarxio,  MMF and Tacrolimus. Check Tacrolimus levels on Monday and Thursday.   when ANC < 500, start Cipro 500mg po BID. If becomes febrile, pan cx, CXR and change Cipro to Cefepime 2g IV q 8 hours. Continue until count recovery  - Fludarabine , intermittent nausea, continue antiemetics   - Fludarabine , IVIG today    TBI today  - HPC transplant today. Continue transplant hydration for 24 hours post infusion of bone marrow   - monitor for CRS / haplo storm - if T >/= 102.5 on days +1 or +2, would dose tocilizumab 8mg / kg IVPB x 1    C.Diff negative, started imodium  - post transplant CTX 2.2 - continue CTX hydration for 24 hours post infusion of last dose , Tosilizumab x1 dose O/N for temp of 39.1.   - Start tacrolimus / MMF today     1. Infectious Disease:   acyclovir   Oral Tab/Cap 400 milliGRAM(s) Oral every 8 hours  cefepime   IVPB      cefepime   IVPB 2000 milliGRAM(s) IV Intermittent every 8 hours  clotrimazole Lozenge 1 Lozenge Oral five times a day  fluconAZOLE   Tablet 400 milliGRAM(s) Oral daily    2. VOD Prophylaxis: Actigall, Glutamine, Heparin (dosed at 100 units / kg / day)     3. GI Prophylaxis:    famotidine    Tablet 20 milliGRAM(s) Oral two times a day    4. Mouthcare - NS / NaHCO3 rinses, Mycelex, Biotene; Skin care     5. GVHD prophylaxis   TBI day -1   CTX days +3, +4   mycophenolate mofetil (Chemo) 1000 milliGRAM(s) Oral three times a day  tacrolimus (Chemo) 1 milliGRAM(s) Oral two times a day    6. Transfuse & replete electrolytes prn     7. IV hydration, daily weights, strict I&O, prn diuresis     8. PO intake as tolerated, nutrition follow up as needed, MVI, folic acid     9. Antiemetics, anti-diarrhea medications:   loperamide 2 milliGRAM(s) Oral every 4 hours PRN  prochlorperazine   Injectable 10 milliGRAM(s) IV Push every 6 hours PRN    10. OOB as tolerated, physical therapy consult if needed     11. Monitor coags / fibrinogen 2x week, vitamin K as needed     12. Monitor closely for clinical changes, monitor for fevers     13. Emotional support provided, plan of care discussed and questions addressed     14. Patient education done regarding plan of care, restrictions and discharge planning     15. Continue regular social work input     I have written the above note for Dr. Wilder who performed service with me in the room.   Mila De Guzman NP-C ([phone])    I have seen and examined patient with NP, I agree with above note as scribed.                    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 slight "muscle twinge" in am with stretching.       O: Vitals:   Vital Signs Last 24 Hrs  T(C): 36.7 (2023 05:35), Max: 36.8 (2023 19:28)  T(F): 98.1 (2023 05:35), Max: 98.2 (2023 19:28)  HR: 81 (2023 05:35) (81 - 104)  BP: 105/68 (2023 05:35) (105/68 - 128/75)  BP(mean): --  RR: 18 (2023 05:35) (18 - 20)  SpO2: 97% (2023 05:35) (95% - 100%)    Parameters below as of 2023 05:35  Patient On (Oxygen Delivery Method): room air    Admit weight: 102.8kg   Daily Weight in k.6 (2023 09:18)  Today's weight: 97.4kg     Intake / Output:   -13 @ 07:01  -  06-14 @ 07:00  --------------------------------------------------------  IN: 4721.5 mL / OUT: 5120 mL / NET: -398.5 mL      PE:   Oropharynx: Clear  Oral Mucositis: (-)                                                        Grade:   CVS: RRR, +S1,S2  Lungs: CTA B/L  Abdomen: Soft, NT, ND, +BS  Extremities: No edema in BLE  Gastric Mucositis: (-)                                                 Grade:   Intestinal Mucositis:   (-)                                           Grade:   Skin: Intact  TLC: CDI  Neuro: Alert, Ox3  Pain: Denies      Labs:                         7.8    0.56  )-----------( 28       ( 2023 07:06 )             22.4         06-14    138  |  102  |  9   ----------------------------<  89  4.2   |  23  |  0.64    Ca    8.8      2023 07:06  Phos  2.8       Mg     2.1         TPro  6.6  /  Alb  4.1  /  TBili  0.7  /  DBili  x   /  AST  23  /  ALT  32  /  AlkPhos  71    Cultures:   Culture Results:   No growth to date. (06.10.23 @ 21:45)    Culture Results:   No growth to date. (06.10.23 @ 21:30)    Culture Results:   No growth to date. (06.10.23 @ 07:14)    Radiology:   ACC: [id number] EXAM:  XR CHEST PORTABLE URGENT 1V   ORDERED BY:  JACQUE ONEIL   PROCEDURE DATE:  06/10/2023    Impression:  Clear lungs.    Meds:   Antimicrobials:   acyclovir   Oral Tab/Cap 400 milliGRAM(s) Oral every 8 hours  cefepime   IVPB      cefepime   IVPB 2000 milliGRAM(s) IV Intermittent every 8 hours  clotrimazole Lozenge 1 Lozenge Oral five times a day  fluconAZOLE   Tablet 400 milliGRAM(s) Oral daily      Heme / Onc:   heparin  Infusion 424 Unit(s)/Hr IV Continuous <Continuous>      GI:  famotidine    Tablet 20 milliGRAM(s) Oral two times a day  loperamide 2 milliGRAM(s) Oral every 4 hours PRN  senna 1 Tablet(s) Oral at bedtime PRN  sodium bicarbonate Mouth Rinse 10 milliLiter(s) Swish and Spit five times a day  ursodiol Capsule 300 milliGRAM(s) Oral two times a day      Immunologic:   filgrastim-sndz (ZARXIO) Injectable (Chemo) 480 MICROGram(s) SubCutaneous every 24 hours  mycophenolate mofetil (Chemo) 1000 milliGRAM(s) Oral three times a day  tacrolimus (Chemo) 1 milliGRAM(s) Oral two times a day      Other medications:   acetaminophen     Tablet .. 650 milliGRAM(s) Oral every 6 hours  Biotene Dry Mouth Oral Rinse 5 milliLiter(s) Swish and Spit five times a day  Chemotherapy Worklist Order      chlorhexidine 4% Liquid 1 Application(s) Topical <User Schedule>  folic acid 1 milliGRAM(s) Oral daily  Infuse HPC Product      lidocaine/prilocaine Cream (Chemo) 1 Application(s) Topical daily  multivitamin 1 Tablet(s) Oral daily  sodium bicarbonate  Infusion (Chemo) 0.074 mEq/kG/Hr IV Continuous <Continuous>  sodium chloride 0.9% 1000 milliLiter(s) IV Continuous <Continuous>  sodium chloride 0.9%. 1000 milliLiter(s) IV Continuous <Continuous>  sodium chloride 0.9%. (Chemo) 1000 milliLiter(s) IV Continuous <Continuous>      PRN:   acetaminophen     Tablet .. 650 milliGRAM(s) Oral every 6 hours PRN  diphenhydrAMINE 25 milliGRAM(s) Oral every 12 hours PRN  loperamide 2 milliGRAM(s) Oral every 4 hours PRN  prochlorperazine   Injectable 10 milliGRAM(s) IV Push every 6 hours PRN  senna 1 Tablet(s) Oral at bedtime PRN  sodium chloride 0.9% lock flush 10 milliLiter(s) IV Push every 1 hour PRN    A/P:  37 year old male with severe aplastic anemia with multiple lines of treatment admitted for a haplo-identical bone marrow transplant with  Flu / Cy / TBI preparative regimen   Day + 6  start CTX hydration - 1/2NS @ 200ml /hr    Strict I&O, daily weights, prn diuresis   Day -6 - day -2 - Fludarabine 30mg/m2 IV  Day -6 - day - 5 - CTX 14.5mg/kg/day IV. CTX to start after urine SG < 1.010. Mesna  12mg / kg - hemorrhagic cystitis ppx   Day -2 - IVIG 0.4 g/kg (ideal body weight) every 3 weeks. Premedication with Tylenol and benadryl   Day -1 -  cGy x 1 dose   Day - 1 - at 2200 begin transplant hydration : 0.45Ns + 1 amp (50mEq) sodium bicarbonate at 150ml /hr; continue transplant hydration for 24 hours post infusion    Day + 2 at 2200 - begin CTX hydration (0.45NS + 10 mEq KCl/ @150ml /m2  continue 24 hours post last dose of CTX   Day + 3 - + 4 - CTX 50mg/kg/day IV. Begin CTX when urine SG < 1.010. EKG daily. Mesna for hemorraghic cystitis ppx.   Day + 5 - start Zarxio,  MMF and Tacrolimus. Check Tacrolimus levels on Monday and Thursday.   when ANC < 500, start Cipro 500mg po BID. If becomes febrile, pan cx, CXR and change Cipro to Cefepime 2g IV q 8 hours. Continue until count recovery  - Fludarabine , intermittent nausea, continue antiemetics   - Fludarabine , IVIG today    TBI today  - HPC transplant today. Continue transplant hydration for 24 hours post infusion of bone marrow   - monitor for CRS / haplo storm - if T >/= 102.5 on days +1 or +2, would dose tocilizumab 8mg / kg IVPB x 1    C.Diff negative, started imodium  - post transplant CTX 2.2 - continue CTX hydration for 24 hours post infusion of last dose , Tosilizumab x1 dose O/N for temp of 39.1.   - Start tacrolimus / MMF today     1. Infectious Disease:   acyclovir   Oral Tab/Cap 400 milliGRAM(s) Oral every 8 hours  cefepime   IVPB      cefepime   IVPB 2000 milliGRAM(s) IV Intermittent every 8 hours  clotrimazole Lozenge 1 Lozenge Oral five times a day  fluconAZOLE   Tablet 400 milliGRAM(s) Oral daily    2. VOD Prophylaxis: Actigall, Glutamine, Heparin (dosed at 100 units / kg / day)     3. GI Prophylaxis:    famotidine    Tablet 20 milliGRAM(s) Oral two times a day    4. Mouthcare - NS / NaHCO3 rinses, Mycelex, Biotene; Skin care     5. GVHD prophylaxis   TBI day -1   CTX days +3, +4   mycophenolate mofetil (Chemo) 1000 milliGRAM(s) Oral three times a day  tacrolimus (Chemo) 1 milliGRAM(s) Oral two times a day    6. Transfuse & replete electrolytes prn     7. IV hydration, daily weights, strict I&O, prn diuresis     8. PO intake as tolerated, nutrition follow up as needed, MVI, folic acid     9. Antiemetics, anti-diarrhea medications:   loperamide 2 milliGRAM(s) Oral every 4 hours PRN  prochlorperazine   Injectable 10 milliGRAM(s) IV Push every 6 hours PRN    10. OOB as tolerated, physical therapy consult if needed     11. Monitor coags / fibrinogen 2x week, vitamin K as needed     12. Monitor closely for clinical changes, monitor for fevers     13. Emotional support provided, plan of care discussed and questions addressed     14. Patient education done regarding plan of care, restrictions and discharge planning     15. Continue regular social work input     I have written the above note for Dr. Wilder who performed service with me in the room.   Mila De Guzman NP-C ([phone])    I have seen and examined patient with NP, I agree with above note as scribed.

## 2023-06-14 NOTE — PHARMACOTHERAPY INTERVENTION NOTE - COMMENTS
37-year-old male with severe aplastic anemia previously treated with eATG +CsA + eltrombopag in April 2020. He relapsed November 2021 and was treated with rATG + CSA + prednisone + eltombopag and then ravulizumab. He is here for a haploidentical allogeneic stem cell transplant, today is day 0. Course has been complicated by intermittent nausea. Patient is on ondansetron 8 mg every 8 hours IV and I encouraged him to request as needed nausea medication. 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 Most recent EKG on 6/12 had a QTc of 427.      Asmita Carranza, PharmD, [facility]  Stem Cell Transplant Clinical Pharmacy Specialist 37-year-old male with severe aplastic anemia previously treated with eATG +CsA + eltrombopag in April 2020. He relapsed November 2021 and was treated with rATG + CSA + prednisone + eltombopag and then ravulizumab. He is here for a haploidentical allogeneic stem cell transplant, today is day +6. Course has been complicated by intermittent nausea. Patient is on ondansetron 8 mg every 8 hours IV and I encouraged him to request as needed nausea medication. 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 Most recent EKG on 6/12 had a QTc of 427.      Asmita Carranza, PharmD, [facility]  Stem Cell Transplant Clinical Pharmacy Specialist

## 2023-06-14 NOTE — PROGRESS NOTE ADULT - NS ATTEND AMEND GEN_ALL_CORE FT
1. Admit to BMTU Today is day + 6  2. TLC placement in IR   3. Day -6 - day + 5 - antiemetics   4. Day - 6 start CTX hydration - 1/2NS @ 200ml /hr    5. Strict I&O, daily weights, prn diuresis   6. Day -6 - day -2 - Fludarabine 30mg/m2 IV  7. Day -6 - day - 5 - CTX 14.5mg/kg/day IV. CTX to start after urine SG < 1.010. Mesna  12mg / kg - hemorrhagic cystitis ppx   8. Day -2 - IVIG 0.4 g/kg (ideal body weight) every 3 weeks. Premedication with Tylenol and benadryl  9. Day -1 -  cGy x 1 dose   10. Day - 1 - at 2200 begin transplant hydration : 0.45Ns + 1 amp (50mEq) sodium bicarbonate at 150ml /hr; continue transplant hydration for 24 hours post infusion   11. Day 0 – HPC transplant   12. Day + 2 at 2200 - begin CTX hydration (0.45NS + 10 mEq KCl/ @150ml /m2  continue 24 hours post last dose of CTX   13. Day + 3 - + 4 - CTX 50mg/kg/day IV. Begin CTX when urine SG < 1.010. EKG daily. Mesna for hemorraghic cystitis ppx.   14. Day + 5 - start Zarxio,  MMF and Tacrolimus. Check Tacrolimus levels on Monday and Thursday.   15. Anxiolytics, antinausea, antidiarrhea medications as needed   16. Lasix PRN while being aggressively hydrated to avoid VOD   17. Nutritional support, pain management  Need for prophylactic measures:  1. VOD prophylaxis - low dose heparin gtt (dosed at 100 units / kg / day), glutamine supplementation, Actigall BID   2. PCP prophylaxis - Bactrim DS through day -2    3. Antiviral prophylaxis - Continue Acyclovir   4. Antifungal prophylaxis- Diflucan 400 mg po daily.  5.. GI prophylaxis - Protonix po QD   6. Antibacterial prophylaxis -  ANC < 500, started Cipro 500mg po BID. If becomes febrile, pan cx, CXR and change Cipro to Cefepime 2g IV q 8 hours. Continue until count recovery..slight rise in wbc noted after infusion of marrow  7. Aggressive mouth care and skin care as per protocol  8. GVHD prophylaxis- high dose CTX; MMF and Tacrolimus.  9. transfusion and electrolyte support    Patient with hemoglobin drop of ~2  on 6/4, likely due to chemotherapy however will repeat CBC tonight to establish stability, likely will require transfusion.  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  6/11 Febrile to 100.6F. Cipro switched to cefepime. Infectious work up ordered.  6/12 day 4 ctx today...given toci for haplo storm..some loose bowel, cdiff neg  6/13 mmf, tacro and zarxio 1. Admit to BMTU Today is day + 6  2. TLC placement in IR   3. Day -6 - day + 5 - antiemetics   4. Day - 6 start CTX hydration - 1/2NS @ 200ml /hr    5. Strict I&O, daily weights, prn diuresis   6. Day -6 - day -2 - Fludarabine 30mg/m2 IV  7. Day -6 - day - 5 - CTX 14.5mg/kg/day IV. CTX to start after urine SG < 1.010. Mesna  12mg / kg - hemorrhagic cystitis ppx   8. Day -2 - IVIG 0.4 g/kg (ideal body weight) every 3 weeks. Premedication with Tylenol and benadryl  9. Day -1 -  cGy x 1 dose   10. Day - 1 - at 2200 begin transplant hydration : 0.45Ns + 1 amp (50mEq) sodium bicarbonate at 150ml /hr; continue transplant hydration for 24 hours post infusion   11. Day 0 – HPC transplant   12. Day + 2 at 2200 - begin CTX hydration (0.45NS + 10 mEq KCl/ @150ml /m2  continue 24 hours post last dose of CTX   13. Day + 3 - + 4 - CTX 50mg/kg/day IV. Begin CTX when urine SG < 1.010. EKG daily. Mesna for hemorraghic cystitis ppx.   14. Day + 5 - start Zarxio,  MMF and Tacrolimus. Check Tacrolimus levels on Monday and Thursday.   15. Anxiolytics, antinausea, antidiarrhea medications as needed   16. Lasix PRN while being aggressively hydrated to avoid VOD   17. Nutritional support, pain management  Need for prophylactic measures:  1. VOD prophylaxis - low dose heparin gtt (dosed at 100 units / kg / day), glutamine supplementation, Actigall BID   2. PCP prophylaxis - Bactrim DS through day -2    3. Antiviral prophylaxis - Continue Acyclovir   4. Antifungal prophylaxis- Diflucan 400 mg po daily.  5.. GI prophylaxis - Protonix po QD   6. Antibacterial prophylaxis -  ANC < 500, started Cipro 500mg po BID. If becomes febrile, pan cx, CXR and change Cipro to Cefepime 2g IV q 8 hours. Continue until count recovery..slight rise in wbc noted after infusion of marrow  7. Aggressive mouth care and skin care as per protocol  8. GVHD prophylaxis- high dose CTX; MMF and Tacrolimus.  9. transfusion and electrolyte support    Patient with hemoglobin drop of ~2  on 6/4, likely due to chemotherapy however will repeat CBC tonight to establish stability, likely will require transfusion.  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  6/11 Febrile to 100.6F. Cipro switched to cefepime. Infectious work up ordered.  6/12 day 4 ctx today...given toci for haplo storm..some loose bowel, cdiff neg  6/13 mmf, tacro and zarxio.

## 2023-06-15 LAB
ALBUMIN SERPL ELPH-MCNC: 4 G/DL — SIGNIFICANT CHANGE UP (ref 3.3–5)
ALP SERPL-CCNC: 79 U/L — SIGNIFICANT CHANGE UP (ref 40–120)
ALT FLD-CCNC: 32 U/L — SIGNIFICANT CHANGE UP (ref 10–45)
ANION GAP SERPL CALC-SCNC: 11 MMOL/L — SIGNIFICANT CHANGE UP (ref 5–17)
APTT BLD: 28.3 SEC — SIGNIFICANT CHANGE UP (ref 27.5–35.5)
AST SERPL-CCNC: 18 U/L — SIGNIFICANT CHANGE UP (ref 10–40)
BILIRUB SERPL-MCNC: 0.6 MG/DL — SIGNIFICANT CHANGE UP (ref 0.2–1.2)
BUN SERPL-MCNC: 11 MG/DL — SIGNIFICANT CHANGE UP (ref 7–23)
CALCIUM SERPL-MCNC: 8.6 MG/DL — SIGNIFICANT CHANGE UP (ref 8.4–10.5)
CHLORIDE SERPL-SCNC: 105 MMOL/L — SIGNIFICANT CHANGE UP (ref 96–108)
CO2 SERPL-SCNC: 22 MMOL/L — SIGNIFICANT CHANGE UP (ref 22–31)
CREAT SERPL-MCNC: 0.59 MG/DL — SIGNIFICANT CHANGE UP (ref 0.5–1.3)
CULTURE RESULTS: SIGNIFICANT CHANGE UP
EGFR: 130 ML/MIN/1.73M2 — SIGNIFICANT CHANGE UP
FIBRINOGEN PPP-MCNC: 327 MG/DL — SIGNIFICANT CHANGE UP (ref 200–445)
GLUCOSE SERPL-MCNC: 92 MG/DL — SIGNIFICANT CHANGE UP (ref 70–99)
HCT VFR BLD CALC: 21.1 % — LOW (ref 39–50)
HGB BLD-MCNC: 7.3 G/DL — LOW (ref 13–17)
INR BLD: 1.08 RATIO — SIGNIFICANT CHANGE UP (ref 0.88–1.16)
LDH SERPL L TO P-CCNC: 305 U/L — HIGH (ref 50–242)
MAGNESIUM SERPL-MCNC: 2.1 MG/DL — SIGNIFICANT CHANGE UP (ref 1.6–2.6)
MCHC RBC-ENTMCNC: 31.9 PG — SIGNIFICANT CHANGE UP (ref 27–34)
MCHC RBC-ENTMCNC: 34.6 GM/DL — SIGNIFICANT CHANGE UP (ref 32–36)
MCV RBC AUTO: 92.1 FL — SIGNIFICANT CHANGE UP (ref 80–100)
NRBC # BLD: 0 /100 WBCS — SIGNIFICANT CHANGE UP (ref 0–0)
PHOSPHATE SERPL-MCNC: 2.6 MG/DL — SIGNIFICANT CHANGE UP (ref 2.5–4.5)
PLATELET # BLD AUTO: 14 K/UL — CRITICAL LOW (ref 150–400)
POTASSIUM SERPL-MCNC: 4.1 MMOL/L — SIGNIFICANT CHANGE UP (ref 3.5–5.3)
POTASSIUM SERPL-SCNC: 4.1 MMOL/L — SIGNIFICANT CHANGE UP (ref 3.5–5.3)
PROT SERPL-MCNC: 6.5 G/DL — SIGNIFICANT CHANGE UP (ref 6–8.3)
PROTHROM AB SERPL-ACNC: 12.5 SEC — SIGNIFICANT CHANGE UP (ref 10.5–13.4)
RBC # BLD: 2.29 M/UL — LOW (ref 4.2–5.8)
RBC # FLD: 14.9 % — HIGH (ref 10.3–14.5)
SODIUM SERPL-SCNC: 138 MMOL/L — SIGNIFICANT CHANGE UP (ref 135–145)
SPECIMEN SOURCE: SIGNIFICANT CHANGE UP
TACROLIMUS SERPL-MCNC: 1.1 NG/ML — SIGNIFICANT CHANGE UP
WBC # BLD: 0.21 K/UL — CRITICAL LOW (ref 3.8–10.5)
WBC # FLD AUTO: 0.21 K/UL — CRITICAL LOW (ref 3.8–10.5)

## 2023-06-15 PROCEDURE — ZZZZZ: CPT

## 2023-06-15 RX ORDER — HYDROCORTISONE 1 %
1 OINTMENT (GRAM) TOPICAL EVERY 12 HOURS
Refills: 0 | Status: DISCONTINUED | OUTPATIENT
Start: 2023-06-15 | End: 2023-06-22

## 2023-06-15 RX ORDER — TACROLIMUS 5 MG/1
2 CAPSULE ORAL
Refills: 0 | Status: DISCONTINUED | OUTPATIENT
Start: 2023-06-15 | End: 2023-06-19

## 2023-06-15 RX ORDER — ONDANSETRON 8 MG/1
8 TABLET, FILM COATED ORAL EVERY 8 HOURS
Refills: 0 | Status: COMPLETED | OUTPATIENT
Start: 2023-06-15 | End: 2023-06-20

## 2023-06-15 RX ADMIN — Medication 1 APPLICATION(S): at 18:05

## 2023-06-15 RX ADMIN — FAMOTIDINE 20 MILLIGRAM(S): 10 INJECTION INTRAVENOUS at 06:47

## 2023-06-15 RX ADMIN — Medication 10 MILLILITER(S): at 20:07

## 2023-06-15 RX ADMIN — ONDANSETRON 8 MILLIGRAM(S): 8 TABLET, FILM COATED ORAL at 22:12

## 2023-06-15 RX ADMIN — Medication 1 LOZENGE: at 17:33

## 2023-06-15 RX ADMIN — Medication 5 MILLILITER(S): at 00:23

## 2023-06-15 RX ADMIN — MYCOPHENOLATE MOFETIL 1000 MILLIGRAM(S): 250 CAPSULE ORAL at 22:06

## 2023-06-15 RX ADMIN — URSODIOL 300 MILLIGRAM(S): 250 TABLET, FILM COATED ORAL at 06:47

## 2023-06-15 RX ADMIN — TACROLIMUS 1 MILLIGRAM(S): 5 CAPSULE ORAL at 06:47

## 2023-06-15 RX ADMIN — Medication 1 LOZENGE: at 00:23

## 2023-06-15 RX ADMIN — CEFEPIME 100 MILLIGRAM(S): 1 INJECTION, POWDER, FOR SOLUTION INTRAMUSCULAR; INTRAVENOUS at 06:46

## 2023-06-15 RX ADMIN — Medication 1 MILLIGRAM(S): at 12:21

## 2023-06-15 RX ADMIN — FAMOTIDINE 20 MILLIGRAM(S): 10 INJECTION INTRAVENOUS at 17:34

## 2023-06-15 RX ADMIN — ONDANSETRON 8 MILLIGRAM(S): 8 TABLET, FILM COATED ORAL at 14:03

## 2023-06-15 RX ADMIN — MYCOPHENOLATE MOFETIL 1000 MILLIGRAM(S): 250 CAPSULE ORAL at 14:04

## 2023-06-15 RX ADMIN — TACROLIMUS 2 MILLIGRAM(S): 5 CAPSULE ORAL at 22:02

## 2023-06-15 RX ADMIN — CHLORHEXIDINE GLUCONATE 1 APPLICATION(S): 213 SOLUTION TOPICAL at 06:48

## 2023-06-15 RX ADMIN — Medication 10 MILLILITER(S): at 00:23

## 2023-06-15 RX ADMIN — Medication 1 LOZENGE: at 20:07

## 2023-06-15 RX ADMIN — Medication 10 MILLILITER(S): at 17:34

## 2023-06-15 RX ADMIN — Medication 1 LOZENGE: at 12:22

## 2023-06-15 RX ADMIN — CEFEPIME 100 MILLIGRAM(S): 1 INJECTION, POWDER, FOR SOLUTION INTRAMUSCULAR; INTRAVENOUS at 14:17

## 2023-06-15 RX ADMIN — Medication 480 MICROGRAM(S): at 12:23

## 2023-06-15 RX ADMIN — FLUCONAZOLE 400 MILLIGRAM(S): 150 TABLET ORAL at 12:21

## 2023-06-15 RX ADMIN — Medication 400 MILLIGRAM(S): at 22:02

## 2023-06-15 RX ADMIN — Medication 5 MILLILITER(S): at 20:07

## 2023-06-15 RX ADMIN — Medication 1 TABLET(S): at 12:21

## 2023-06-15 RX ADMIN — CEFEPIME 100 MILLIGRAM(S): 1 INJECTION, POWDER, FOR SOLUTION INTRAMUSCULAR; INTRAVENOUS at 22:02

## 2023-06-15 RX ADMIN — Medication 400 MILLIGRAM(S): at 14:04

## 2023-06-15 RX ADMIN — Medication 1 LOZENGE: at 08:33

## 2023-06-15 RX ADMIN — ONDANSETRON 8 MILLIGRAM(S): 8 TABLET, FILM COATED ORAL at 08:08

## 2023-06-15 RX ADMIN — Medication 5 MILLILITER(S): at 17:33

## 2023-06-15 RX ADMIN — MYCOPHENOLATE MOFETIL 1000 MILLIGRAM(S): 250 CAPSULE ORAL at 06:47

## 2023-06-15 RX ADMIN — Medication 5 MILLILITER(S): at 12:22

## 2023-06-15 RX ADMIN — Medication 5 MILLILITER(S): at 08:32

## 2023-06-15 RX ADMIN — Medication 10 MILLILITER(S): at 08:32

## 2023-06-15 RX ADMIN — Medication 10 MILLILITER(S): at 12:22

## 2023-06-15 RX ADMIN — URSODIOL 300 MILLIGRAM(S): 250 TABLET, FILM COATED ORAL at 17:34

## 2023-06-15 RX ADMIN — Medication 400 MILLIGRAM(S): at 06:47

## 2023-06-15 NOTE — PROGRESS NOTE ADULT - NS ATTEND AMEND GEN_ALL_CORE FT
1. Admit to BMTU Today is day + 6  2. TLC placement in IR   3. Day -6 - day + 5 - antiemetics   4. Day - 6 start CTX hydration - 1/2NS @ 200ml /hr    5. Strict I&O, daily weights, prn diuresis   6. Day -6 - day -2 - Fludarabine 30mg/m2 IV  7. Day -6 - day - 5 - CTX 14.5mg/kg/day IV. CTX to start after urine SG < 1.010. Mesna  12mg / kg - hemorrhagic cystitis ppx   8. Day -2 - IVIG 0.4 g/kg (ideal body weight) every 3 weeks. Premedication with Tylenol and benadryl  9. Day -1 -  cGy x 1 dose   10. Day - 1 - at 2200 begin transplant hydration : 0.45Ns + 1 amp (50mEq) sodium bicarbonate at 150ml /hr; continue transplant hydration for 24 hours post infusion   11. Day 0 – HPC transplant   12. Day + 2 at 2200 - begin CTX hydration (0.45NS + 10 mEq KCl/ @150ml /m2  continue 24 hours post last dose of CTX   13. Day + 3 - + 4 - CTX 50mg/kg/day IV. Begin CTX when urine SG < 1.010. EKG daily. Mesna for hemorraghic cystitis ppx.   14. Day + 5 - start Zarxio,  MMF and Tacrolimus. Check Tacrolimus levels on Monday and Thursday.   15. Anxiolytics, antinausea, antidiarrhea medications as needed   16. Lasix PRN while being aggressively hydrated to avoid VOD   17. Nutritional support, pain management  Need for prophylactic measures:  1. VOD prophylaxis - low dose heparin gtt (dosed at 100 units / kg / day), glutamine supplementation, Actigall BID   2. PCP prophylaxis - Bactrim DS through day -2    3. Antiviral prophylaxis - Continue Acyclovir   4. Antifungal prophylaxis- Diflucan 400 mg po daily.  5.. GI prophylaxis - Protonix po QD   6. Antibacterial prophylaxis -  ANC < 500, started Cipro 500mg po BID. If becomes febrile, pan cx, CXR and change Cipro to Cefepime 2g IV q 8 hours. Continue until count recovery..slight rise in wbc noted after infusion of marrow  7. Aggressive mouth care and skin care as per protocol  8. GVHD prophylaxis- high dose CTX; MMF and Tacrolimus.  9. transfusion and electrolyte support    Patient with hemoglobin drop of ~2  on 6/4, likely due to chemotherapy however will repeat CBC tonight to establish stability, likely will require transfusion.  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  6/11 Febrile to 100.6F. Cipro switched to cefepime. Infectious work up ordered.  6/12 day 4 ctx today...given toci for haplo storm..some loose bowel, cdiff neg  6/13 mmf, tacro and zarxio. 1. Admit to BMTU Today is day + 7  2. TLC placement in IR   3. Day -6 - day + 5 - antiemetics   4. Day - 6 start CTX hydration - 1/2NS @ 200ml /hr    5. Strict I&O, daily weights, prn diuresis   6. Day -6 - day -2 - Fludarabine 30mg/m2 IV  7. Day -6 - day - 5 - CTX 14.5mg/kg/day IV. CTX to start after urine SG < 1.010. Mesna  12mg / kg - hemorrhagic cystitis ppx   8. Day -2 - IVIG 0.4 g/kg (ideal body weight) every 3 weeks. Premedication with Tylenol and benadryl  9. Day -1 -  cGy x 1 dose   10. Day - 1 - at 2200 begin transplant hydration : 0.45Ns + 1 amp (50mEq) sodium bicarbonate at 150ml /hr; continue transplant hydration for 24 hours post infusion   11. Day 0 – HPC transplant   12. Day + 2 at 2200 - begin CTX hydration (0.45NS + 10 mEq KCl/ @150ml /m2  continue 24 hours post last dose of CTX   13. Day + 3 - + 4 - CTX 50mg/kg/day IV. Begin CTX when urine SG < 1.010. EKG daily. Mesna for hemorraghic cystitis ppx.   14. Day + 5 - start Zarxio,  MMF and Tacrolimus. Check Tacrolimus levels on Monday and Thursday.   15. Anxiolytics, antinausea, antidiarrhea medications as needed   16. Lasix PRN while being aggressively hydrated to avoid VOD   17. Nutritional support, pain management  Need for prophylactic measures:  1. VOD prophylaxis - low dose heparin gtt (dosed at 100 units / kg / day), glutamine supplementation, Actigall BID   2. PCP prophylaxis - Bactrim DS through day -2    3. Antiviral prophylaxis - Continue Acyclovir   4. Antifungal prophylaxis- Diflucan 400 mg po daily.  5.. GI prophylaxis - Protonix po QD   6. Antibacterial prophylaxis -  ANC < 500, started Cipro 500mg po BID. If becomes febrile, pan cx, CXR and change Cipro to Cefepime 2g IV q 8 hours. Continue until count recovery..slight rise in wbc noted after infusion of marrow  7. Aggressive mouth care and skin care as per protocol  8. GVHD prophylaxis- high dose CTX; MMF and Tacrolimus.  9. transfusion and electrolyte support    Patient with hemoglobin drop of ~2  on 6/4, likely due to chemotherapy however will repeat CBC tonight to establish stability, likely will require transfusion.  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  6/11 Febrile to 100.6F. Cipro switched to cefepime. Infectious work up ordered.  6/12 day 4 ctx today...given toci for haplo storm..some loose bowel, cdiff neg  6/13 mmf, tacro and zarxio.

## 2023-06-15 NOTE — PROGRESS NOTE ADULT - SUBJECTIVE AND OBJECTIVE BOX
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 () with Flu / Cy / TBI prep regimen for treatment of severe aplastic anemia    S: Patient seen and examined with HPC Transplant Team:       O: Vitals:   Vital Signs Last 24 Hrs  T(C): 36.3 (15 Garfield 2023 06:05), Max: 36.6 (15 Garfield 2023 02:00)  T(F): 97.3 (15 Garfield 2023 06:05), Max: 97.9 (15 Garfield 2023 02:00)  HR: 88 (15 Garfield 2023 06:05) (76 - 91)  BP: 110/70 (15 Garfield 2023 06:05) (106/70 - 123/79)  BP(mean): --  RR: 20 (15 Garfield 2023 06:05) (18 - 20)  SpO2: 98% (15 Garfield 2023 06:05) (96% - 98%)    Parameters below as of 2023 21:08  Patient On (Oxygen Delivery Method): room air      Admit weight: 102.8kg   Daily Weight in k.4 (2023 09:15)  Today's weight:     Intake / Output:   06-14 @ 07:01  -  15 @ 07:00  --------------------------------------------------------  IN: 1616 mL / OUT: 2550 mL / NET: -934 mL      PE:   Oropharynx: Clear  Oral Mucositis: (-)                                                        Grade:   CVS: RRR, +S1,S2  Lungs: CTA B/L  Abdomen: Soft, NT, ND, +BS  Extremities: No edema in BLE  Gastric Mucositis: (-)                                                 Grade:   Intestinal Mucositis:   (-)                                           Grade:   Skin: Intact  TLC: CDI  Neuro: Alert, Ox3  Pain: Denies    Labs:       06-15    138  |  105  |  11  ----------------------------<  92  4.1   |  22  |  0.59    Ca    8.6      15 Garfield 2023 06:49  Phos  2.6     06-15  Mg     2.1     06-15    TPro  6.5  /  Alb  4.0  /  TBili  0.6  /  DBili  x   /  AST  18  /  ALT  32  /  AlkPhos  79  06-15      LIVER FUNCTIONS - ( 15 Garfield 2023 06:49 )  Alb: 4.0 g/dL / Pro: 6.5 g/dL / ALK PHOS: 79 U/L / ALT: 32 U/L / AST: 18 U/L / GGT: x           Lactate Dehydrogenase, Serum: 305 U/L (06-15 @ 06:49)      Cultures:   Culture Results:   No growth to date. (06.10.23 @ 21:45)    Culture Results:   No growth to date. (06.10.23 @ 21:30)    Culture Results:   No growth to date. (06.10.23 @ 07:14)    Radiology:   ACC: [id number] EXAM:  XR CHEST PORTABLE URGENT 1V   ORDERED BY:  JACQUE ONEIL   PROCEDURE DATE:  06/10/2023    Impression:  Clear lungs.      Meds:   Antimicrobials:   acyclovir   Oral Tab/Cap 400 milliGRAM(s) Oral every 8 hours  cefepime   IVPB 2000 milliGRAM(s) IV Intermittent every 8 hours  cefepime   IVPB      clotrimazole Lozenge 1 Lozenge Oral five times a day  fluconAZOLE   Tablet 400 milliGRAM(s) Oral daily      Heme / Onc:   heparin  Infusion 424 Unit(s)/Hr IV Continuous <Continuous>      GI:  famotidine    Tablet 20 milliGRAM(s) Oral two times a day  loperamide 2 milliGRAM(s) Oral every 4 hours PRN  senna 1 Tablet(s) Oral at bedtime PRN  sodium bicarbonate Mouth Rinse 10 milliLiter(s) Swish and Spit five times a day  ursodiol Capsule 300 milliGRAM(s) Oral two times a day      Cardiovascular:       Immunologic:   filgrastim-sndz (ZARXIO) Injectable (Chemo) 480 MICROGram(s) SubCutaneous every 24 hours  mycophenolate mofetil (Chemo) 1000 milliGRAM(s) Oral three times a day  tacrolimus (Chemo) 1 milliGRAM(s) Oral two times a day      Other medications:   acetaminophen     Tablet .. 650 milliGRAM(s) Oral every 6 hours  Biotene Dry Mouth Oral Rinse 5 milliLiter(s) Swish and Spit five times a day  Chemotherapy Worklist Order      chlorhexidine 4% Liquid 1 Application(s) Topical <User Schedule>  folic acid 1 milliGRAM(s) Oral daily  Infuse HPC Product      lidocaine/prilocaine Cream (Chemo) 1 Application(s) Topical daily  multivitamin 1 Tablet(s) Oral daily  sodium bicarbonate  Infusion (Chemo) 0.074 mEq/kG/Hr IV Continuous <Continuous>  sodium chloride 0.9% 1000 milliLiter(s) IV Continuous <Continuous>  sodium chloride 0.9%. 1000 milliLiter(s) IV Continuous <Continuous>  sodium chloride 0.9%. (Chemo) 1000 milliLiter(s) IV Continuous <Continuous>      PRN:   acetaminophen     Tablet .. 650 milliGRAM(s) Oral every 6 hours PRN  diphenhydrAMINE 25 milliGRAM(s) Oral every 12 hours PRN  loperamide 2 milliGRAM(s) Oral every 4 hours PRN  ondansetron Injectable 8 milliGRAM(s) IV Push every 8 hours PRN  prochlorperazine   Injectable 10 milliGRAM(s) IV Push every 6 hours PRN  senna 1 Tablet(s) Oral at bedtime PRN  sodium chloride 0.9% lock flush 10 milliLiter(s) IV Push every 1 hour PRN    A/P:  37 year old male with severe aplastic anemia with multiple lines of treatment admitted for a haplo-identical bone marrow transplant with  Flu / Cy / TBI preparative regimen   Day + 7  start CTX hydration - 1/2NS @ 200ml /hr    Strict I&O, daily weights, prn diuresis   Day -6 - day -2 - Fludarabine 30mg/m2 IV  Day -6 - day - 5 - CTX 14.5mg/kg/day IV. CTX to start after urine SG < 1.010. Mesna  12mg / kg - hemorrhagic cystitis ppx   Day -2 - IVIG 0.4 g/kg (ideal body weight) every 3 weeks. Premedication with Tylenol and benadryl   Day -1 -  cGy x 1 dose   Day - 1 - at 2200 begin transplant hydration : 0.45Ns + 1 amp (50mEq) sodium bicarbonate at 150ml /hr; continue transplant hydration for 24 hours post infusion    Day + 2 at 2200 - begin CTX hydration (0.45NS + 10 mEq KCl/ @150ml /m2  continue 24 hours post last dose of CTX   Day + 3 - + 4 - CTX 50mg/kg/day IV. Begin CTX when urine SG < 1.010. EKG daily. Mesna for hemorraghic cystitis ppx.   Day + 5 - start Zarxio,  MMF and Tacrolimus. Check Tacrolimus levels on Monday and Thursday.   when ANC < 500, start Cipro 500mg po BID. If becomes febrile, pan cx, CXR and change Cipro to Cefepime 2g IV q 8 hours. Continue until count recovery  - Fludarabine , intermittent nausea, continue antiemetics   - Fludarabine , IVIG today    TBI today  - HPC transplant today. Continue transplant hydration for 24 hours post infusion of bone marrow   - monitor for CRS / haplo storm - if T >/= 102.5 on days +1 or +2, would dose tocilizumab 8mg / kg IVPB x 1    C.Diff negative, started imodium  - post transplant CTX 2.2 - continue CTX hydration for 24 hours post infusion of last dose , Tosilizumab x1 dose O/N for temp of 39.1.   - Start tacrolimus / MMF today     1. Infectious Disease:   acyclovir   Oral Tab/Cap 400 milliGRAM(s) Oral every 8 hours  cefepime   IVPB 2000 milliGRAM(s) IV Intermittent every 8 hours  cefepime   IVPB      clotrimazole Lozenge 1 Lozenge Oral five times a day  fluconAZOLE   Tablet 400 milliGRAM(s) Oral daily    2. VOD Prophylaxis: Actigall, Glutamine, Heparin (dosed at 100 units / kg / day)     3. GI Prophylaxis:    famotidine    Tablet 20 milliGRAM(s) Oral two times a day    4. Mouthcare - NS / NaHCO3 rinses, Mycelex, Biotene; Skin care     5. GVHD prophylaxis   TBI day -1   CTX days +3, +4   mycophenolate mofetil (Chemo) 1000 milliGRAM(s) Oral three times a day  tacrolimus (Chemo) 1 milliGRAM(s) Oral two times a day    6. Transfuse & replete electrolytes prn     7. IV hydration, daily weights, strict I&O, prn diuresis     8. PO intake as tolerated, nutrition follow up as needed, MVI, folic acid     9. Antiemetics, anti-diarrhea medications:   loperamide 2 milliGRAM(s) Oral every 4 hours PRN  ondansetron Injectable 8 milliGRAM(s) IV Push every 8 hours PRN  prochlorperazine   Injectable 10 milliGRAM(s) IV Push every 6 hours PRN    10. OOB as tolerated, physical therapy consult if needed     11. Monitor coags / fibrinogen 2x week, vitamin K as needed     12. Monitor closely for clinical changes, monitor for fevers     13. Emotional support provided, plan of care discussed and questions addressed     14. Patient education done regarding plan of care, restrictions and discharge planning     15. Continue regular social work input     I have written the above note for Dr. Wilder who performed service with me in the room.   Mila De Guzman NP-C ([phone])    I have seen and examined patient with NP, I agree with above note as scribed.                    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 () with Flu / Cy / TBI prep regimen for treatment of severe aplastic anemia    S: Patient seen and examined with HPC Transplant Team:       O: Vitals:   Vital Signs Last 24 Hrs  T(C): 36.3 (15 Garfield 2023 06:05), Max: 36.6 (15 Garfield 2023 02:00)  T(F): 97.3 (15 Garfield 2023 06:05), Max: 97.9 (15 Garfield 2023 02:00)  HR: 88 (15 Garfield 2023 06:05) (76 - 91)  BP: 110/70 (15 Garfield 2023 06:05) (106/70 - 123/79)  BP(mean): --  RR: 20 (15 Garfield 2023 06:05) (18 - 20)  SpO2: 98% (15 Garfield 2023 06:05) (96% - 98%)    Parameters below as of 2023 21:08  Patient On (Oxygen Delivery Method): room air      Admit weight: 102.8kg   Daily Weight in k.4 (2023 09:15)  Today's weight:     Intake / Output:   06-14 @ 07:01  -  06-15 @ 07:00  --------------------------------------------------------  IN: 1616 mL / OUT: 2550 mL / NET: -934 mL      PE:   Oropharynx: Clear  Oral Mucositis: (-)                                                        Grade:   CVS: RRR, +S1,S2  Lungs: CTA B/L  Abdomen: Soft, NT, ND, +BS  Extremities: No edema in BLE  Gastric Mucositis: (-)                                                 Grade:   Intestinal Mucositis:   (-)                                           Grade:   Skin: Intact  TLC: CDI  Neuro: Alert, Ox3  Pain: Denies    Labs:                         7.3    0.21  )-----------(        ( 15 Garfield 2023 06:49 )             21.1         06-15    138  |  105  |  11  ----------------------------<  92  4.1   |  22  |  0.59    Ca    8.6      15 Garfield 2023 06:49  Phos  2.6     06-15  Mg     2.1     06-15    TPro  6.5  /  Alb  4.0  /  TBili  0.6  /  DBili  x   /  AST  18  /  ALT  32  /  AlkPhos  79  06-15      LIVER FUNCTIONS - ( 15 Garfield 2023 06:49 )  Alb: 4.0 g/dL / Pro: 6.5 g/dL / ALK PHOS: 79 U/L / ALT: 32 U/L / AST: 18 U/L / GGT: x           Lactate Dehydrogenase, Serum: 305 U/L (06-15 @ 06:49)      Cultures:   Culture Results:   No growth to date. (06.10.23 @ 21:45)    Culture Results:   No growth to date. (06.10.23 @ 21:30)    Culture Results:   No growth to date. (06.10.23 @ 07:14)    Radiology:   ACC: [id number] EXAM:  XR CHEST PORTABLE URGENT 1V   ORDERED BY:  JACQUE ONEIL   PROCEDURE DATE:  06/10/2023    Impression:  Clear lungs.      Meds:   Antimicrobials:   acyclovir   Oral Tab/Cap 400 milliGRAM(s) Oral every 8 hours  cefepime   IVPB 2000 milliGRAM(s) IV Intermittent every 8 hours  cefepime   IVPB      clotrimazole Lozenge 1 Lozenge Oral five times a day  fluconAZOLE   Tablet 400 milliGRAM(s) Oral daily      Heme / Onc:   heparin  Infusion 424 Unit(s)/Hr IV Continuous <Continuous>      GI:  famotidine    Tablet 20 milliGRAM(s) Oral two times a day  loperamide 2 milliGRAM(s) Oral every 4 hours PRN  senna 1 Tablet(s) Oral at bedtime PRN  sodium bicarbonate Mouth Rinse 10 milliLiter(s) Swish and Spit five times a day  ursodiol Capsule 300 milliGRAM(s) Oral two times a day      Cardiovascular:       Immunologic:   filgrastim-sndz (ZARXIO) Injectable (Chemo) 480 MICROGram(s) SubCutaneous every 24 hours  mycophenolate mofetil (Chemo) 1000 milliGRAM(s) Oral three times a day  tacrolimus (Chemo) 1 milliGRAM(s) Oral two times a day      Other medications:   acetaminophen     Tablet .. 650 milliGRAM(s) Oral every 6 hours  Biotene Dry Mouth Oral Rinse 5 milliLiter(s) Swish and Spit five times a day  Chemotherapy Worklist Order      chlorhexidine 4% Liquid 1 Application(s) Topical <User Schedule>  folic acid 1 milliGRAM(s) Oral daily  Infuse HPC Product      lidocaine/prilocaine Cream (Chemo) 1 Application(s) Topical daily  multivitamin 1 Tablet(s) Oral daily  sodium bicarbonate  Infusion (Chemo) 0.074 mEq/kG/Hr IV Continuous <Continuous>  sodium chloride 0.9% 1000 milliLiter(s) IV Continuous <Continuous>  sodium chloride 0.9%. 1000 milliLiter(s) IV Continuous <Continuous>  sodium chloride 0.9%. (Chemo) 1000 milliLiter(s) IV Continuous <Continuous>      PRN:   acetaminophen     Tablet .. 650 milliGRAM(s) Oral every 6 hours PRN  diphenhydrAMINE 25 milliGRAM(s) Oral every 12 hours PRN  loperamide 2 milliGRAM(s) Oral every 4 hours PRN  ondansetron Injectable 8 milliGRAM(s) IV Push every 8 hours PRN  prochlorperazine   Injectable 10 milliGRAM(s) IV Push every 6 hours PRN  senna 1 Tablet(s) Oral at bedtime PRN  sodium chloride 0.9% lock flush 10 milliLiter(s) IV Push every 1 hour PRN    A/P:  37 year old male with severe aplastic anemia with multiple lines of treatment admitted for a haplo-identical bone marrow transplant with  Flu / Cy / TBI preparative regimen   Day + 7  start CTX hydration - 1/2NS @ 200ml /hr    Strict I&O, daily weights, prn diuresis   Day -6 - day -2 - Fludarabine 30mg/m2 IV  Day -6 - day - 5 - CTX 14.5mg/kg/day IV. CTX to start after urine SG < 1.010. Mesna  12mg / kg - hemorrhagic cystitis ppx   Day -2 - IVIG 0.4 g/kg (ideal body weight) every 3 weeks. Premedication with Tylenol and benadryl   Day -1 -  cGy x 1 dose   Day - 1 - at 2200 begin transplant hydration : 0.45Ns + 1 amp (50mEq) sodium bicarbonate at 150ml /hr; continue transplant hydration for 24 hours post infusion    Day + 2 at 2200 - begin CTX hydration (0.45NS + 10 mEq KCl/ @150ml /m2  continue 24 hours post last dose of CTX   Day + 3 - + 4 - CTX 50mg/kg/day IV. Begin CTX when urine SG < 1.010. EKG daily. Mesna for hemorraghic cystitis ppx.   Day + 5 - start Zarxio,  MMF and Tacrolimus. Check Tacrolimus levels on Monday and Thursday.   when ANC < 500, start Cipro 500mg po BID. If becomes febrile, pan cx, CXR and change Cipro to Cefepime 2g IV q 8 hours. Continue until count recovery  - Fludarabine , intermittent nausea, continue antiemetics   - Fludarabine , IVIG today    TBI today  - HPC transplant today. Continue transplant hydration for 24 hours post infusion of bone marrow   - monitor for CRS / haplo storm - if T >/= 102.5 on days +1 or +2, would dose tocilizumab 8mg / kg IVPB x 1    C.Diff negative, started imodium  - post transplant CTX 2.2 - continue CTX hydration for 24 hours post infusion of last dose , Tosilizumab x1 dose O/N for temp of 39.1.   - Start tacrolimus / MMF today     1. Infectious Disease:   acyclovir   Oral Tab/Cap 400 milliGRAM(s) Oral every 8 hours  cefepime   IVPB 2000 milliGRAM(s) IV Intermittent every 8 hours  cefepime   IVPB      clotrimazole Lozenge 1 Lozenge Oral five times a day  fluconAZOLE   Tablet 400 milliGRAM(s) Oral daily    2. VOD Prophylaxis: Actigall, Glutamine, Heparin (dosed at 100 units / kg / day)     3. GI Prophylaxis:    famotidine    Tablet 20 milliGRAM(s) Oral two times a day    4. Mouthcare - NS / NaHCO3 rinses, Mycelex, Biotene; Skin care     5. GVHD prophylaxis   TBI day -1   CTX days +3, +4   mycophenolate mofetil (Chemo) 1000 milliGRAM(s) Oral three times a day  tacrolimus (Chemo) 1 milliGRAM(s) Oral two times a day    6. Transfuse & replete electrolytes prn     7. IV hydration, daily weights, strict I&O, prn diuresis     8. PO intake as tolerated, nutrition follow up as needed, MVI, folic acid     9. Antiemetics, anti-diarrhea medications:   loperamide 2 milliGRAM(s) Oral every 4 hours PRN  ondansetron Injectable 8 milliGRAM(s) IV Push every 8 hours PRN  prochlorperazine   Injectable 10 milliGRAM(s) IV Push every 6 hours PRN    10. OOB as tolerated, physical therapy consult if needed     11. Monitor coags / fibrinogen 2x week, vitamin K as needed     12. Monitor closely for clinical changes, monitor for fevers     13. Emotional support provided, plan of care discussed and questions addressed     14. Patient education done regarding plan of care, restrictions and discharge planning     15. Continue regular social work input     I have written the above note for Dr. Wilder who performed service with me in the room.   Mila De Guzman NP-C ([phone])    I have seen and examined patient with NP, I agree with above note as scribed.                    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 () with Flu / Cy / TBI prep regimen for treatment of severe aplastic anemia    S: Patient seen and examined with HPC Transplant Team:   + fatigue     O: Vitals:   Vital Signs Last 24 Hrs  T(C): 36.3 (15 Garfield 2023 06:05), Max: 36.6 (15 Garfield 2023 02:00)  T(F): 97.3 (15 Garfield 2023 06:05), Max: 97.9 (15 Garfield 2023 02:00)  HR: 88 (15 Garfield 2023 06:05) (76 - 91)  BP: 110/70 (15 Garfield 2023 06:05) (106/70 - 123/79)  BP(mean): --  RR: 20 (15 Garfield 2023 06:05) (18 - 20)  SpO2: 98% (15 Garfield 2023 06:05) (96% - 98%)    Parameters below as of 2023 21:08  Patient On (Oxygen Delivery Method): room air      Admit weight: 102.8kg   Daily Weight in k.4 (2023 09:15)  Today's weight: 96.9kg     Intake / Output:   -14 @ 07:01  -  06-15 @ 07:00  --------------------------------------------------------  IN: 1616 mL / OUT: 2550 mL / NET: -934 mL      PE:   Oropharynx: Clear  Oral Mucositis: (-)                                                        Grade:   CVS: RRR, +S1,S2  Lungs: CTA B/L  Abdomen: Soft, NT, ND, +BS  Extremities: No edema in BLE  Gastric Mucositis: (-)                                                 Grade:   Intestinal Mucositis:   (-)                                           Grade:   Skin: Intact  TLC: CDI  Neuro: Alert, Ox3  Pain: Denies    Labs:                         7.3    0.21  )-----------( 14       ( 15 Garfield 2023 06:49 )             21.1         06-15    138  |  105  |  11  ----------------------------<  92  4.1   |  22  |  0.59    Ca    8.6      15 Garfield 2023 06:49  Phos  2.6     06-15  Mg     2.1     06-15    TPro  6.5  /  Alb  4.0  /  TBili  0.6  /  DBili  x   /  AST  18  /  ALT  32  /  AlkPhos  79  06-15      LIVER FUNCTIONS - ( 15 Garfield 2023 06:49 )  Alb: 4.0 g/dL / Pro: 6.5 g/dL / ALK PHOS: 79 U/L / ALT: 32 U/L / AST: 18 U/L / GGT: x           Lactate Dehydrogenase, Serum: 305 U/L (06-15 @ 06:49)      Cultures:   Culture Results:   No growth to date. (06.10.23 @ 21:45)    Culture Results:   No growth to date. (06.10.23 @ 21:30)    Culture Results:   No growth to date. (06.10.23 @ 07:14)    Radiology:   ACC: [id number] EXAM:  XR CHEST PORTABLE URGENT 1V   ORDERED BY:  JACQUE ONEIL   PROCEDURE DATE:  06/10/2023    Impression:  Clear lungs.      Meds:   Antimicrobials:   acyclovir   Oral Tab/Cap 400 milliGRAM(s) Oral every 8 hours  cefepime   IVPB 2000 milliGRAM(s) IV Intermittent every 8 hours  cefepime   IVPB      clotrimazole Lozenge 1 Lozenge Oral five times a day  fluconAZOLE   Tablet 400 milliGRAM(s) Oral daily      Heme / Onc:   heparin  Infusion 424 Unit(s)/Hr IV Continuous <Continuous>      GI:  famotidine    Tablet 20 milliGRAM(s) Oral two times a day  loperamide 2 milliGRAM(s) Oral every 4 hours PRN  senna 1 Tablet(s) Oral at bedtime PRN  sodium bicarbonate Mouth Rinse 10 milliLiter(s) Swish and Spit five times a day  ursodiol Capsule 300 milliGRAM(s) Oral two times a day      Cardiovascular:       Immunologic:   filgrastim-sndz (ZARXIO) Injectable (Chemo) 480 MICROGram(s) SubCutaneous every 24 hours  mycophenolate mofetil (Chemo) 1000 milliGRAM(s) Oral three times a day  tacrolimus (Chemo) 1 milliGRAM(s) Oral two times a day      Other medications:   acetaminophen     Tablet .. 650 milliGRAM(s) Oral every 6 hours  Biotene Dry Mouth Oral Rinse 5 milliLiter(s) Swish and Spit five times a day  Chemotherapy Worklist Order      chlorhexidine 4% Liquid 1 Application(s) Topical <User Schedule>  folic acid 1 milliGRAM(s) Oral daily  Infuse HPC Product      lidocaine/prilocaine Cream (Chemo) 1 Application(s) Topical daily  multivitamin 1 Tablet(s) Oral daily  sodium bicarbonate  Infusion (Chemo) 0.074 mEq/kG/Hr IV Continuous <Continuous>  sodium chloride 0.9% 1000 milliLiter(s) IV Continuous <Continuous>  sodium chloride 0.9%. 1000 milliLiter(s) IV Continuous <Continuous>  sodium chloride 0.9%. (Chemo) 1000 milliLiter(s) IV Continuous <Continuous>      PRN:   acetaminophen     Tablet .. 650 milliGRAM(s) Oral every 6 hours PRN  diphenhydrAMINE 25 milliGRAM(s) Oral every 12 hours PRN  loperamide 2 milliGRAM(s) Oral every 4 hours PRN  ondansetron Injectable 8 milliGRAM(s) IV Push every 8 hours PRN  prochlorperazine   Injectable 10 milliGRAM(s) IV Push every 6 hours PRN  senna 1 Tablet(s) Oral at bedtime PRN  sodium chloride 0.9% lock flush 10 milliLiter(s) IV Push every 1 hour PRN    A/P:  37 year old male with severe aplastic anemia with multiple lines of treatment admitted for a haplo-identical bone marrow transplant with  Flu / Cy / TBI preparative regimen   Day + 7  start CTX hydration - 1/2NS @ 200ml /hr    Strict I&O, daily weights, prn diuresis   Day -6 - day -2 - Fludarabine 30mg/m2 IV  Day -6 - day - 5 - CTX 14.5mg/kg/day IV. CTX to start after urine SG < 1.010. Mesna  12mg / kg - hemorrhagic cystitis ppx   Day -2 - IVIG 0.4 g/kg (ideal body weight) every 3 weeks. Premedication with Tylenol and benadryl   Day -1 -  cGy x 1 dose   Day - 1 - at 2200 begin transplant hydration : 0.45Ns + 1 amp (50mEq) sodium bicarbonate at 150ml /hr; continue transplant hydration for 24 hours post infusion    Day + 2 at 2200 - begin CTX hydration (0.45NS + 10 mEq KCl/ @150ml /m2  continue 24 hours post last dose of CTX   Day + 3 - + 4 - CTX 50mg/kg/day IV. Begin CTX when urine SG < 1.010. EKG daily. Mesna for hemorraghic cystitis ppx.   Day + 5 - start Zarxio,  MMF and Tacrolimus. Check Tacrolimus levels on Monday and Thursday.   when ANC < 500, start Cipro 500mg po BID. If becomes febrile, pan cx, CXR and change Cipro to Cefepime 2g IV q 8 hours. Continue until count recovery  - Fludarabine , intermittent nausea, continue antiemetics   - Fludarabine , IVIG today    TBI today  - HPC transplant today. Continue transplant hydration for 24 hours post infusion of bone marrow   - monitor for CRS / haplo storm - if T >/= 102.5 on days +1 or +2, would dose tocilizumab 8mg / kg IVPB x 1    C.Diff negative, started imodium  - post transplant CTX 2.2 - continue CTX hydration for 24 hours post infusion of last dose , Tosilizumab x1 dose O/N for temp of 39.1.   - Start tacrolimus / MMF today     1. Infectious Disease:   acyclovir   Oral Tab/Cap 400 milliGRAM(s) Oral every 8 hours  cefepime   IVPB 2000 milliGRAM(s) IV Intermittent every 8 hours  cefepime   IVPB      clotrimazole Lozenge 1 Lozenge Oral five times a day  fluconAZOLE   Tablet 400 milliGRAM(s) Oral daily    2. VOD Prophylaxis: Actigall, Glutamine, Heparin (dosed at 100 units / kg / day)     3. GI Prophylaxis:    famotidine    Tablet 20 milliGRAM(s) Oral two times a day    4. Mouthcare - NS / NaHCO3 rinses, Mycelex, Biotene; Skin care     5. GVHD prophylaxis   TBI day -1   CTX days +3, +4   mycophenolate mofetil (Chemo) 1000 milliGRAM(s) Oral three times a day  tacrolimus (Chemo) 1 milliGRAM(s) Oral two times a day    6. Transfuse & replete electrolytes prn     7. IV hydration, daily weights, strict I&O, prn diuresis     8. PO intake as tolerated, nutrition follow up as needed, MVI, folic acid     9. Antiemetics, anti-diarrhea medications:   loperamide 2 milliGRAM(s) Oral every 4 hours PRN  ondansetron Injectable 8 milliGRAM(s) IV Push every 8 hours PRN  prochlorperazine   Injectable 10 milliGRAM(s) IV Push every 6 hours PRN    10. OOB as tolerated, physical therapy consult if needed     11. Monitor coags / fibrinogen 2x week, vitamin K as needed     12. Monitor closely for clinical changes, monitor for fevers     13. Emotional support provided, plan of care discussed and questions addressed     14. Patient education done regarding plan of care, restrictions and discharge planning     15. Continue regular social work input     I have written the above note for Dr. Wilder who performed service with me in the room.   Mila De Guzman NP-C ([phone])    I have seen and examined patient with NP, I agree with above note as scribed.                    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 () with Flu / Cy / TBI prep regimen for treatment of severe aplastic anemia    S: Patient seen and examined with HPC Transplant Team:   + fatigue   + nausea     O: Vitals:   Vital Signs Last 24 Hrs  T(C): 36.3 (15 [place]field 2023 06:05), Max: 36.6 (15 Garfield 2023 02:00)  T(F): 97.3 (15 Garfield 2023 06:05), Max: 97.9 (15 Garfield 2023 02:00)  HR: 88 (15 Garfield 2023 06:05) (76 - 91)  BP: 110/70 (15 Garfield 2023 06:05) (106/70 - 123/79)  BP(mean): --  RR: 20 (15 Garfield 2023 06:05) (18 - 20)  SpO2: 98% (15 Garfield 2023 06:05) (96% - 98%)    Parameters below as of 2023 21:08  Patient On (Oxygen Delivery Method): room air      Admit weight: 102.8kg   Daily Weight in k.4 (2023 09:15)  Today's weight: 96.9kg     Intake / Output:   -14 @ 07:01  -  06-15 @ 07:00  --------------------------------------------------------  IN: 1616 mL / OUT: 2550 mL / NET: -934 mL      PE:   Oropharynx: Clear  Oral Mucositis: (-)                                                        Grade:   CVS: RRR, +S1,S2  Lungs: CTA B/L  Abdomen: Soft, NT, ND, +BS  Extremities: No edema in BLE  Gastric Mucositis: (-)                                                 Grade:   Intestinal Mucositis:   (-)                                           Grade:   Skin: Intact  TLC: CDI  Neuro: Alert, Ox3  Pain: Denies    Labs:                         7.3    0.21  )-----------( 14       ( 15 Garfield 2023 06:49 )             21.1         06-15    138  |  105  |  11  ----------------------------<  92  4.1   |  22  |  0.59    Ca    8.6      15 Garfield 2023 06:49  Phos  2.6     06-15  Mg     2.1     06-15    TPro  6.5  /  Alb  4.0  /  TBili  0.6  /  DBili  x   /  AST  18  /  ALT  32  /  AlkPhos  79  06-15      LIVER FUNCTIONS - ( 15 Garfield 2023 06:49 )  Alb: 4.0 g/dL / Pro: 6.5 g/dL / ALK PHOS: 79 U/L / ALT: 32 U/L / AST: 18 U/L / GGT: x           Lactate Dehydrogenase, Serum: 305 U/L (06-15 @ 06:49)      Cultures:   Culture Results:   No growth to date. (06.10.23 @ 21:45)    Culture Results:   No growth to date. (06.10.23 @ 21:30)    Culture Results:   No growth to date. (06.10.23 @ 07:14)    Radiology:   ACC: [id number] EXAM:  XR CHEST PORTABLE URGENT 1V   ORDERED BY:  JACQUE ONEIL   PROCEDURE DATE:  06/10/2023    Impression:  Clear lungs.      Meds:   Antimicrobials:   acyclovir   Oral Tab/Cap 400 milliGRAM(s) Oral every 8 hours  cefepime   IVPB 2000 milliGRAM(s) IV Intermittent every 8 hours  cefepime   IVPB      clotrimazole Lozenge 1 Lozenge Oral five times a day  fluconAZOLE   Tablet 400 milliGRAM(s) Oral daily      Heme / Onc:   heparin  Infusion 424 Unit(s)/Hr IV Continuous <Continuous>      GI:  famotidine    Tablet 20 milliGRAM(s) Oral two times a day  loperamide 2 milliGRAM(s) Oral every 4 hours PRN  senna 1 Tablet(s) Oral at bedtime PRN  sodium bicarbonate Mouth Rinse 10 milliLiter(s) Swish and Spit five times a day  ursodiol Capsule 300 milliGRAM(s) Oral two times a day      Cardiovascular:       Immunologic:   filgrastim-sndz (ZARXIO) Injectable (Chemo) 480 MICROGram(s) SubCutaneous every 24 hours  mycophenolate mofetil (Chemo) 1000 milliGRAM(s) Oral three times a day  tacrolimus (Chemo) 1 milliGRAM(s) Oral two times a day      Other medications:   acetaminophen     Tablet .. 650 milliGRAM(s) Oral every 6 hours  Biotene Dry Mouth Oral Rinse 5 milliLiter(s) Swish and Spit five times a day  Chemotherapy Worklist Order      chlorhexidine 4% Liquid 1 Application(s) Topical <User Schedule>  folic acid 1 milliGRAM(s) Oral daily  Infuse HPC Product      lidocaine/prilocaine Cream (Chemo) 1 Application(s) Topical daily  multivitamin 1 Tablet(s) Oral daily  sodium bicarbonate  Infusion (Chemo) 0.074 mEq/kG/Hr IV Continuous <Continuous>  sodium chloride 0.9% 1000 milliLiter(s) IV Continuous <Continuous>  sodium chloride 0.9%. 1000 milliLiter(s) IV Continuous <Continuous>  sodium chloride 0.9%. (Chemo) 1000 milliLiter(s) IV Continuous <Continuous>      PRN:   acetaminophen     Tablet .. 650 milliGRAM(s) Oral every 6 hours PRN  diphenhydrAMINE 25 milliGRAM(s) Oral every 12 hours PRN  loperamide 2 milliGRAM(s) Oral every 4 hours PRN  ondansetron Injectable 8 milliGRAM(s) IV Push every 8 hours PRN  prochlorperazine   Injectable 10 milliGRAM(s) IV Push every 6 hours PRN  senna 1 Tablet(s) Oral at bedtime PRN  sodium chloride 0.9% lock flush 10 milliLiter(s) IV Push every 1 hour PRN    A/P:  37 year old male with severe aplastic anemia with multiple lines of treatment admitted for a haplo-identical bone marrow transplant with  Flu / Cy / TBI preparative regimen   Day + 7  start CTX hydration - 1/2NS @ 200ml /hr    Strict I&O, daily weights, prn diuresis   Day -6 - day -2 - Fludarabine 30mg/m2 IV  Day -6 - day - 5 - CTX 14.5mg/kg/day IV. CTX to start after urine SG < 1.010. Mesna  12mg / kg - hemorrhagic cystitis ppx   Day -2 - IVIG 0.4 g/kg (ideal body weight) every 3 weeks. Premedication with Tylenol and benadryl   Day -1 -  cGy x 1 dose   Day - 1 - at 2200 begin transplant hydration : 0.45Ns + 1 amp (50mEq) sodium bicarbonate at 150ml /hr; continue transplant hydration for 24 hours post infusion    Day + 2 at 2200 - begin CTX hydration (0.45NS + 10 mEq KCl/ @150ml /m2  continue 24 hours post last dose of CTX   Day + 3 - + 4 - CTX 50mg/kg/day IV. Begin CTX when urine SG < 1.010. EKG daily. Mesna for hemorraghic cystitis ppx.   Day + 5 - start Zarxio,  MMF and Tacrolimus. Check Tacrolimus levels on Monday and Thursday.   when ANC < 500, start Cipro 500mg po BID. If becomes febrile, pan cx, CXR and change Cipro to Cefepime 2g IV q 8 hours. Continue until count recovery  - Fludarabine , intermittent nausea, continue antiemetics   - Fludarabine , IVIG today    TBI today  - HPC transplant today. Continue transplant hydration for 24 hours post infusion of bone marrow   - monitor for CRS / haplo storm - if T >/= 102.5 on days +1 or +2, would dose tocilizumab 8mg / kg IVPB x 1    C.Diff negative, started imodium  - post transplant CTX 2.2 - continue CTX hydration for 24 hours post infusion of last dose , Tosilizumab x1 dose O/N for temp of 39.1.   - Start tacrolimus / MMF today   6/15- persistent nausea, restart zofran ATC     1. Infectious Disease:   acyclovir   Oral Tab/Cap 400 milliGRAM(s) Oral every 8 hours  cefepime   IVPB 2000 milliGRAM(s) IV Intermittent every 8 hours  cefepime   IVPB      clotrimazole Lozenge 1 Lozenge Oral five times a day  fluconAZOLE   Tablet 400 milliGRAM(s) Oral daily    2. VOD Prophylaxis: Actigall, Glutamine, Heparin (dosed at 100 units / kg / day)     3. GI Prophylaxis:    famotidine    Tablet 20 milliGRAM(s) Oral two times a day    4. Mouthcare - NS / NaHCO3 rinses, Mycelex, Biotene; Skin care     5. GVHD prophylaxis   TBI day -1   CTX days +3, +4   mycophenolate mofetil (Chemo) 1000 milliGRAM(s) Oral three times a day  tacrolimus (Chemo) 1 milliGRAM(s) Oral two times a day    6. Transfuse & replete electrolytes prn     7. IV hydration, daily weights, strict I&O, prn diuresis     8. PO intake as tolerated, nutrition follow up as needed, MVI, folic acid     9. Antiemetics, anti-diarrhea medications:   loperamide 2 milliGRAM(s) Oral every 4 hours PRN  ondansetron Injectable 8 milliGRAM(s) IV Push every 8 hours PRN  prochlorperazine   Injectable 10 milliGRAM(s) IV Push every 6 hours PRN    10. OOB as tolerated, physical therapy consult if needed     11. Monitor coags / fibrinogen 2x week, vitamin K as needed     12. Monitor closely for clinical changes, monitor for fevers     13. Emotional support provided, plan of care discussed and questions addressed     14. Patient education done regarding plan of care, restrictions and discharge planning     15. Continue regular social work input     I have written the above note for Dr. Wilder who performed service with me in the room.   Mila De Guzman NP-C ([phone])    I have seen and examined patient with NP, I agree with above note as scribed.

## 2023-06-15 NOTE — PHARMACOTHERAPY INTERVENTION NOTE - COMMENTS
37-year-old male with severe aplastic anemia previously treated with eATG +CsA + eltrombopag in April 2020. He relapsed November 2021 and was treated with rATG + CSA + prednisone + eltombopag and then ravulizumab. He is here for a haploidentical allogeneic stem cell transplant, today is day +7. Course has been complicated by intermittent nausea. Patient is on ondansetron 8 mg every 8 hours IV and I encouraged him to request as needed nausea medication. 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 Most recent EKG on 6/12 had a QTc of 427.    Most recent tacrolimus trough is 1.1 (drawn correctly, NOT at steady state) - recommend keeping dose as is and re-assessing on Monday 6/19 when at steady state.    Asmita Carranza, PharmD, [facility]  Stem Cell Transplant Clinical Pharmacy Specialist

## 2023-06-16 LAB
ALBUMIN SERPL ELPH-MCNC: 4.3 G/DL — SIGNIFICANT CHANGE UP (ref 3.3–5)
ALP SERPL-CCNC: 81 U/L — SIGNIFICANT CHANGE UP (ref 40–120)
ALT FLD-CCNC: 39 U/L — SIGNIFICANT CHANGE UP (ref 10–45)
ANION GAP SERPL CALC-SCNC: 12 MMOL/L — SIGNIFICANT CHANGE UP (ref 5–17)
AST SERPL-CCNC: 21 U/L — SIGNIFICANT CHANGE UP (ref 10–40)
BILIRUB DIRECT SERPL-MCNC: 0.1 MG/DL — SIGNIFICANT CHANGE UP (ref 0–0.3)
BILIRUB INDIRECT FLD-MCNC: 0.5 MG/DL — SIGNIFICANT CHANGE UP (ref 0.2–1)
BILIRUB SERPL-MCNC: 0.6 MG/DL — SIGNIFICANT CHANGE UP (ref 0.2–1.2)
BLD GP AB SCN SERPL QL: NEGATIVE — SIGNIFICANT CHANGE UP
BUN SERPL-MCNC: 10 MG/DL — SIGNIFICANT CHANGE UP (ref 7–23)
CALCIUM SERPL-MCNC: 8.8 MG/DL — SIGNIFICANT CHANGE UP (ref 8.4–10.5)
CHLORIDE SERPL-SCNC: 105 MMOL/L — SIGNIFICANT CHANGE UP (ref 96–108)
CO2 SERPL-SCNC: 21 MMOL/L — LOW (ref 22–31)
CREAT SERPL-MCNC: 0.59 MG/DL — SIGNIFICANT CHANGE UP (ref 0.5–1.3)
CULTURE RESULTS: SIGNIFICANT CHANGE UP
CULTURE RESULTS: SIGNIFICANT CHANGE UP
EGFR: 130 ML/MIN/1.73M2 — SIGNIFICANT CHANGE UP
GLUCOSE SERPL-MCNC: 91 MG/DL — SIGNIFICANT CHANGE UP (ref 70–99)
HCT VFR BLD CALC: 21.9 % — LOW (ref 39–50)
HGB BLD-MCNC: 7.6 G/DL — LOW (ref 13–17)
LDH SERPL L TO P-CCNC: 264 U/L — HIGH (ref 50–242)
MAGNESIUM SERPL-MCNC: 2 MG/DL — SIGNIFICANT CHANGE UP (ref 1.6–2.6)
MCHC RBC-ENTMCNC: 32.5 PG — SIGNIFICANT CHANGE UP (ref 27–34)
MCHC RBC-ENTMCNC: 34.7 GM/DL — SIGNIFICANT CHANGE UP (ref 32–36)
MCV RBC AUTO: 93.6 FL — SIGNIFICANT CHANGE UP (ref 80–100)
NRBC # BLD: 0 /100 WBCS — SIGNIFICANT CHANGE UP (ref 0–0)
PHOSPHATE SERPL-MCNC: 2.6 MG/DL — SIGNIFICANT CHANGE UP (ref 2.5–4.5)
PLATELET # BLD AUTO: 8 K/UL — CRITICAL LOW (ref 150–400)
POTASSIUM SERPL-MCNC: 4.1 MMOL/L — SIGNIFICANT CHANGE UP (ref 3.5–5.3)
POTASSIUM SERPL-SCNC: 4.1 MMOL/L — SIGNIFICANT CHANGE UP (ref 3.5–5.3)
PROT SERPL-MCNC: 6.7 G/DL — SIGNIFICANT CHANGE UP (ref 6–8.3)
RBC # BLD: 2.34 M/UL — LOW (ref 4.2–5.8)
RBC # FLD: 14.6 % — HIGH (ref 10.3–14.5)
RH IG SCN BLD-IMP: POSITIVE — SIGNIFICANT CHANGE UP
SODIUM SERPL-SCNC: 138 MMOL/L — SIGNIFICANT CHANGE UP (ref 135–145)
SPECIMEN SOURCE: SIGNIFICANT CHANGE UP
SPECIMEN SOURCE: SIGNIFICANT CHANGE UP
WBC # BLD: 0.04 K/UL — CRITICAL LOW (ref 3.8–10.5)
WBC # FLD AUTO: 0.04 K/UL — CRITICAL LOW (ref 3.8–10.5)

## 2023-06-16 PROCEDURE — ZZZZZ: CPT

## 2023-06-16 RX ADMIN — Medication 10 MILLILITER(S): at 23:31

## 2023-06-16 RX ADMIN — Medication 25 MILLIGRAM(S): at 09:03

## 2023-06-16 RX ADMIN — ONDANSETRON 8 MILLIGRAM(S): 8 TABLET, FILM COATED ORAL at 06:58

## 2023-06-16 RX ADMIN — TACROLIMUS 2 MILLIGRAM(S): 5 CAPSULE ORAL at 20:36

## 2023-06-16 RX ADMIN — TACROLIMUS 2 MILLIGRAM(S): 5 CAPSULE ORAL at 08:13

## 2023-06-16 RX ADMIN — SODIUM CHLORIDE 20 MILLILITER(S): 9 INJECTION INTRAMUSCULAR; INTRAVENOUS; SUBCUTANEOUS at 20:28

## 2023-06-16 RX ADMIN — URSODIOL 300 MILLIGRAM(S): 250 TABLET, FILM COATED ORAL at 06:59

## 2023-06-16 RX ADMIN — Medication 10 MILLILITER(S): at 00:17

## 2023-06-16 RX ADMIN — Medication 1 LOZENGE: at 23:31

## 2023-06-16 RX ADMIN — Medication 1 LOZENGE: at 20:29

## 2023-06-16 RX ADMIN — MYCOPHENOLATE MOFETIL 1000 MILLIGRAM(S): 250 CAPSULE ORAL at 20:34

## 2023-06-16 RX ADMIN — Medication 650 MILLIGRAM(S): at 10:03

## 2023-06-16 RX ADMIN — Medication 2 MILLIGRAM(S): at 19:10

## 2023-06-16 RX ADMIN — Medication 1 TABLET(S): at 12:26

## 2023-06-16 RX ADMIN — Medication 10 MILLILITER(S): at 08:11

## 2023-06-16 RX ADMIN — CEFEPIME 100 MILLIGRAM(S): 1 INJECTION, POWDER, FOR SOLUTION INTRAMUSCULAR; INTRAVENOUS at 12:49

## 2023-06-16 RX ADMIN — MYCOPHENOLATE MOFETIL 1000 MILLIGRAM(S): 250 CAPSULE ORAL at 12:50

## 2023-06-16 RX ADMIN — Medication 400 MILLIGRAM(S): at 07:00

## 2023-06-16 RX ADMIN — Medication 5 MILLILITER(S): at 00:17

## 2023-06-16 RX ADMIN — URSODIOL 300 MILLIGRAM(S): 250 TABLET, FILM COATED ORAL at 17:28

## 2023-06-16 RX ADMIN — CHLORHEXIDINE GLUCONATE 1 APPLICATION(S): 213 SOLUTION TOPICAL at 07:00

## 2023-06-16 RX ADMIN — Medication 1 LOZENGE: at 00:17

## 2023-06-16 RX ADMIN — MYCOPHENOLATE MOFETIL 1000 MILLIGRAM(S): 250 CAPSULE ORAL at 06:59

## 2023-06-16 RX ADMIN — Medication 400 MILLIGRAM(S): at 12:49

## 2023-06-16 RX ADMIN — Medication 10 MILLILITER(S): at 12:25

## 2023-06-16 RX ADMIN — FLUCONAZOLE 400 MILLIGRAM(S): 150 TABLET ORAL at 12:26

## 2023-06-16 RX ADMIN — Medication 5 MILLILITER(S): at 15:32

## 2023-06-16 RX ADMIN — FAMOTIDINE 20 MILLIGRAM(S): 10 INJECTION INTRAVENOUS at 06:59

## 2023-06-16 RX ADMIN — Medication 5 MILLILITER(S): at 12:25

## 2023-06-16 RX ADMIN — CEFEPIME 100 MILLIGRAM(S): 1 INJECTION, POWDER, FOR SOLUTION INTRAMUSCULAR; INTRAVENOUS at 06:59

## 2023-06-16 RX ADMIN — Medication 1 LOZENGE: at 08:11

## 2023-06-16 RX ADMIN — CEFEPIME 100 MILLIGRAM(S): 1 INJECTION, POWDER, FOR SOLUTION INTRAMUSCULAR; INTRAVENOUS at 20:34

## 2023-06-16 RX ADMIN — Medication 1 APPLICATION(S): at 07:13

## 2023-06-16 RX ADMIN — Medication 5 MILLILITER(S): at 08:11

## 2023-06-16 RX ADMIN — Medication 480 MICROGRAM(S): at 12:48

## 2023-06-16 RX ADMIN — ONDANSETRON 8 MILLIGRAM(S): 8 TABLET, FILM COATED ORAL at 12:49

## 2023-06-16 RX ADMIN — Medication 10 MILLILITER(S): at 15:32

## 2023-06-16 RX ADMIN — Medication 10 MILLILITER(S): at 20:36

## 2023-06-16 RX ADMIN — Medication 1 APPLICATION(S): at 17:28

## 2023-06-16 RX ADMIN — Medication 400 MILLIGRAM(S): at 20:34

## 2023-06-16 RX ADMIN — Medication 1 LOZENGE: at 12:26

## 2023-06-16 RX ADMIN — Medication 5 MILLILITER(S): at 20:30

## 2023-06-16 RX ADMIN — ONDANSETRON 8 MILLIGRAM(S): 8 TABLET, FILM COATED ORAL at 20:35

## 2023-06-16 RX ADMIN — LIDOCAINE AND PRILOCAINE CREAM 1 APPLICATION(S): 25; 25 CREAM TOPICAL at 12:48

## 2023-06-16 RX ADMIN — HEPARIN SODIUM 4.24 UNIT(S)/HR: 5000 INJECTION INTRAVENOUS; SUBCUTANEOUS at 20:29

## 2023-06-16 RX ADMIN — FAMOTIDINE 20 MILLIGRAM(S): 10 INJECTION INTRAVENOUS at 17:28

## 2023-06-16 RX ADMIN — Medication 1 MILLIGRAM(S): at 12:26

## 2023-06-16 RX ADMIN — Medication 5 MILLILITER(S): at 23:32

## 2023-06-16 RX ADMIN — Medication 1 LOZENGE: at 15:32

## 2023-06-16 NOTE — PROGRESS NOTE ADULT - NS ATTEND AMEND GEN_ALL_CORE FT
1. Admit to BMTU Today is day + 7  2. TLC placement in IR   3. Day -6 - day + 5 - antiemetics   4. Day - 6 start CTX hydration - 1/2NS @ 200ml /hr    5. Strict I&O, daily weights, prn diuresis   6. Day -6 - day -2 - Fludarabine 30mg/m2 IV  7. Day -6 - day - 5 - CTX 14.5mg/kg/day IV. CTX to start after urine SG < 1.010. Mesna  12mg / kg - hemorrhagic cystitis ppx   8. Day -2 - IVIG 0.4 g/kg (ideal body weight) every 3 weeks. Premedication with Tylenol and benadryl  9. Day -1 -  cGy x 1 dose   10. Day - 1 - at 2200 begin transplant hydration : 0.45Ns + 1 amp (50mEq) sodium bicarbonate at 150ml /hr; continue transplant hydration for 24 hours post infusion   11. Day 0 – HPC transplant   12. Day + 2 at 2200 - begin CTX hydration (0.45NS + 10 mEq KCl/ @150ml /m2  continue 24 hours post last dose of CTX   13. Day + 3 - + 4 - CTX 50mg/kg/day IV. Begin CTX when urine SG < 1.010. EKG daily. Mesna for hemorraghic cystitis ppx.   14. Day + 5 - start Zarxio,  MMF and Tacrolimus. Check Tacrolimus levels on Monday and Thursday.   15. Anxiolytics, antinausea, antidiarrhea medications as needed   16. Lasix PRN while being aggressively hydrated to avoid VOD   17. Nutritional support, pain management  Need for prophylactic measures:  1. VOD prophylaxis - low dose heparin gtt (dosed at 100 units / kg / day), glutamine supplementation, Actigall BID   2. PCP prophylaxis - Bactrim DS through day -2    3. Antiviral prophylaxis - Continue Acyclovir   4. Antifungal prophylaxis- Diflucan 400 mg po daily.  5.. GI prophylaxis - Protonix po QD   6. Antibacterial prophylaxis -  ANC < 500, started Cipro 500mg po BID. If becomes febrile, pan cx, CXR and change Cipro to Cefepime 2g IV q 8 hours. Continue until count recovery..slight rise in wbc noted after infusion of marrow  7. Aggressive mouth care and skin care as per protocol  8. GVHD prophylaxis- high dose CTX; MMF and Tacrolimus.  9. transfusion and electrolyte support    Patient with hemoglobin drop of ~2  on 6/4, likely due to chemotherapy however will repeat CBC tonight to establish stability, likely will require transfusion.  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  6/11 Febrile to 100.6F. Cipro switched to cefepime. Infectious work up ordered.  6/12 day 4 ctx today...given toci for haplo storm..some loose bowel, cdiff neg  6/13 mmf, tacro and zarxio. 1. Admit to BMTU Today is day + 8  2. TLC placement in IR   3. Day -6 - day + 5 - antiemetics   4. Day - 6 start CTX hydration - 1/2NS @ 200ml /hr    5. Strict I&O, daily weights, prn diuresis   6. Day -6 - day -2 - Fludarabine 30mg/m2 IV  7. Day -6 - day - 5 - CTX 14.5mg/kg/day IV. CTX to start after urine SG < 1.010. Mesna  12mg / kg - hemorrhagic cystitis ppx   8. Day -2 - IVIG 0.4 g/kg (ideal body weight) every 3 weeks. Premedication with Tylenol and benadryl  9. Day -1 -  cGy x 1 dose   10. Day - 1 - at 2200 begin transplant hydration : 0.45Ns + 1 amp (50mEq) sodium bicarbonate at 150ml /hr; continue transplant hydration for 24 hours post infusion   11. Day 0 – HPC transplant   12. Day + 2 at 2200 - begin CTX hydration (0.45NS + 10 mEq KCl/ @150ml /m2  continue 24 hours post last dose of CTX   13. Day + 3 - + 4 - CTX 50mg/kg/day IV. Begin CTX when urine SG < 1.010. EKG daily. Mesna for hemorraghic cystitis ppx.   14. Day + 5 - start Zarxio,  MMF and Tacrolimus. Check Tacrolimus levels on Monday and Thursday.   15. Anxiolytics, antinausea, antidiarrhea medications as needed   16. Lasix PRN while being aggressively hydrated to avoid VOD   17. Nutritional support, pain management  Need for prophylactic measures:  1. VOD prophylaxis - low dose heparin gtt (dosed at 100 units / kg / day), glutamine supplementation, Actigall BID   2. PCP prophylaxis - Bactrim DS through day -2    3. Antiviral prophylaxis - Continue Acyclovir   4. Antifungal prophylaxis- Diflucan 400 mg po daily.  5.. GI prophylaxis - Protonix po QD   6. Antibacterial prophylaxis -  ANC < 500, started Cipro 500mg po BID. If becomes febrile, pan cx, CXR and change Cipro to Cefepime 2g IV q 8 hours. Continue until count recovery..slight rise in wbc noted after infusion of marrow  7. Aggressive mouth care and skin care as per protocol  8. GVHD prophylaxis- high dose CTX; MMF and Tacrolimus.  9. transfusion and electrolyte support    Patient with hemoglobin drop of ~2  on 6/4, likely due to chemotherapy however will repeat CBC tonight to establish stability, likely will require transfusion.  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  6/11 Febrile to 100.6F. Cipro switched to cefepime. Infectious work up ordered.  6/12 day 4 ctx today...given toci for haplo storm..some loose bowel, cdiff neg  6/13 mmf, tacro and zarxio.  6/16 overall doing well..on tacro 2 mg..repeat on monday..goal 5 to 10

## 2023-06-16 NOTE — PROGRESS NOTE ADULT - SUBJECTIVE AND OBJECTIVE BOX
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 () with Flu / Cy / TBI prep regimen for treatment of severe aplastic anemia    S: Patient seen and examined with HPC Transplant Team:   + fatigue   + nausea     O: Vitals:   Vital Signs Last 24 Hrs  T(C): 36.8 (2023 06:00), Max: 36.8 (15 [facility] 2023 21:37)  T(F): 98.2 (2023 06:00), Max: 98.2 (15 [facility] 2023 21:37)  HR: 80 (2023 06:00) (80 - 86)  BP: 115/72 (2023 06:00) (106/64 - 130/77)  BP(mean): --  RR: 18 (2023 06:00) (18 - 18)  SpO2: 98% (2023 06:00) (96% - 100%)    Parameters below as of 15 [facility] 2023 21:37  Patient On (Oxygen Delivery Method): room air    Admit weight: 102.8 kg  Daily Weight in k.9 (15 [place] 2023 09:35)  Today's weight: pending     Intake / Output:   -15 @ 07:01  -  06-16 @ 07:00  --------------------------------------------------------  IN: 1886 mL / OUT: 2125 mL / NET: -239 mL    PE:   Oropharynx: Clear  Oral Mucositis: (-)                                                        Grade:   CVS: RRR, +S1,S2  Lungs: CTA B/L  Abdomen: Soft, NT, ND, +BS  Extremities: No edema in BLE  Gastric Mucositis: (-)                                                 Grade:   Intestinal Mucositis:   (-)                                           Grade:   Skin: Intact  TLC: CDI  Neuro: Alert, Ox3  Pain: Denies      Labs:   CBC Full  -  ( 2023 07:11 )  WBC Count : 0.04 K/uL  Hemoglobin : 7.6 g/dL  Hematocrit : 21.9 %  Platelet Count - Automated : 8 K/uL  Mean Cell Volume : 93.6 fl  Mean Cell Hemoglobin : 32.5 pg  Mean Cell Hemoglobin Concentration : 34.7 gm/dL  Auto Neutrophil # : x  Auto Lymphocyte # : x  Auto Monocyte # : x  Auto Eosinophil # : x  Auto Basophil # : x  Auto Neutrophil % : x  Auto Lymphocyte % : x  Auto Monocyte % : x  Auto Eosinophil % : x  Auto Basophil % : x                          7.6    0.04  )-----------( 8        ( 2023 07:11 )             21.9     -    138  |  105  |  10  ----------------------------<  91  4.1   |  21<L>  |  0.59    Ca    8.8      2023 07:06  Phos  2.6       Mg     2.0         TPro  6.7  /  Alb  4.3  /  TBili  0.6  /  DBili  0.1  /  AST  21  /  ALT  39  /  AlkPhos  81  -16    PT/INR - ( 15 [facility] 2023 06:49 )   PT: 12.5 sec;   INR: 1.08 ratio         PTT - ( 15 [facility] 2023 06:49 )  PTT:28.3 sec  LIVER FUNCTIONS - ( 2023 07:06 )  Alb: 4.3 g/dL / Pro: 6.7 g/dL / ALK PHOS: 81 U/L / ALT: 39 U/L / AST: 21 U/L / GGT: x           Lactate Dehydrogenase, Serum: 264 U/L ( @ 07:06)    Cultures:   Culture - Blood (06.10.23 @ 21:45)    Specimen Source: .Blood Blood-Catheter   Culture Results:   No Growth Final    Meds:   Antimicrobials:   acyclovir   Oral Tab/Cap 400 milliGRAM(s) Oral every 8 hours  cefepime   IVPB 2000 milliGRAM(s) IV Intermittent every 8 hours  clotrimazole Lozenge 1 Lozenge Oral five times a day  fluconAZOLE   Tablet 400 milliGRAM(s) Oral daily    Heme / Onc:   heparin  Infusion 424 Unit(s)/Hr IV Continuous <Continuous>    GI:  famotidine    Tablet 20 milliGRAM(s) Oral two times a day  loperamide 2 milliGRAM(s) Oral every 4 hours PRN  senna 1 Tablet(s) Oral at bedtime PRN  sodium bicarbonate Mouth Rinse 10 milliLiter(s) Swish and Spit five times a day  ursodiol Capsule 300 milliGRAM(s) Oral two times a day    Immunologic:   filgrastim-sndz (ZARXIO) Injectable (Chemo) 480 MICROGram(s) SubCutaneous every 24 hours  mycophenolate mofetil (Chemo) 1000 milliGRAM(s) Oral three times a day  tacrolimus 2 milliGRAM(s) Oral <User Schedule>    Other medications:   acetaminophen     Tablet .. 650 milliGRAM(s) Oral every 6 hours  Biotene Dry Mouth Oral Rinse 5 milliLiter(s) Swish and Spit five times a day  Chemotherapy Worklist Order      chlorhexidine 4% Liquid 1 Application(s) Topical <User Schedule>  folic acid 1 milliGRAM(s) Oral daily  hydrocortisone 1% Cream 1 Application(s) Topical every 12 hours  Infuse HPC Product      lidocaine/prilocaine Cream (Chemo) 1 Application(s) Topical daily  multivitamin 1 Tablet(s) Oral daily  ondansetron Injectable 8 milliGRAM(s) IV Push every 8 hours  sodium bicarbonate  Infusion (Chemo) 0.074 mEq/kG/Hr IV Continuous <Continuous>  sodium chloride 0.9% 1000 milliLiter(s) IV Continuous <Continuous>  sodium chloride 0.9%. 1000 milliLiter(s) IV Continuous <Continuous>  sodium chloride 0.9%. (Chemo) 1000 milliLiter(s) IV Continuous <Continuous>    PRN:   acetaminophen     Tablet .. 650 milliGRAM(s) Oral every 6 hours PRN  diphenhydrAMINE 25 milliGRAM(s) Oral every 12 hours PRN  loperamide 2 milliGRAM(s) Oral every 4 hours PRN  prochlorperazine   Injectable 10 milliGRAM(s) IV Push every 6 hours PRN  senna 1 Tablet(s) Oral at bedtime PRN  sodium chloride 0.9% lock flush 10 milliLiter(s) IV Push every 1 hour PRN      A/P:  37 year old male with severe aplastic anemia with multiple lines of treatment admitted for a haplo-identical bone marrow transplant with  Flu / Cy / TBI preparative regimen   Day + 8  start CTX hydration - 1/2NS @ 200ml /hr    Strict I&O, daily weights, prn diuresis   Day -6 - day -2 - Fludarabine 30mg/m2 IV  Day -6 - day - 5 - CTX 14.5mg/kg/day IV. CTX to start after urine SG < 1.010. Mesna  12mg / kg - hemorrhagic cystitis ppx   Day -2 - IVIG 0.4 g/kg (ideal body weight) every 3 weeks. Premedication with Tylenol and benadryl   Day -1 -  cGy x 1 dose   Day - 1 - at 2200 begin transplant hydration : 0.45Ns + 1 amp (50mEq) sodium bicarbonate at 150ml /hr; continue transplant hydration for 24 hours post infusion    Day + 2 at 2200 - begin CTX hydration (0.45NS + 10 mEq KCl/ @150ml /m2  continue 24 hours post last dose of CTX   Day + 3 - + 4 - CTX 50mg/kg/day IV. Begin CTX when urine SG < 1.010. EKG daily. Mesna for hemorraghic cystitis ppx.   Day + 5 - start Zarxio,  MMF and Tacrolimus. Check Tacrolimus levels on Monday and Thursday.   when ANC < 500, start Cipro 500mg po BID. If becomes febrile, pan cx, CXR and change Cipro to Cefepime 2g IV q 8 hours. Continue until count recovery  - Fludarabine , intermittent nausea, continue antiemetics   - Fludarabine , IVIG today    TBI today  - HPC transplant today. Continue transplant hydration for 24 hours post infusion of bone marrow   - monitor for CRS / haplo storm - if T >/= 102.5 on days +1 or +2, would dose tocilizumab 8mg / kg IVPB x 1    C.Diff negative, started imodium  - post transplant CTX 2.2 - continue CTX hydration for 24 hours post infusion of last dose , Tosilizumab x1 dose O/N for temp of 39.1.   - Start tacrolimus / MMF today   6/15- persistent nausea, restart zofran ATC       1. Infectious Disease:   acyclovir   Oral Tab/Cap 400 milliGRAM(s) Oral every 8 hours  cefepime   IVPB 2000 milliGRAM(s) IV Intermittent every 8 hours  clotrimazole Lozenge 1 Lozenge Oral five times a day  fluconAZOLE   Tablet 400 milliGRAM(s) Oral daily    2. VOD Prophylaxis: Actigall, Glutamine, Heparin (dosed at 100 units / kg / day)     3. GI Prophylaxis:    famotidine    Tablet 20 milliGRAM(s) Oral two times a day    4. Mouthcare - NS / NaHCO3 rinses, Mycelex, Biotene; Skin care     5. GVHD prophylaxis   TBI day -1   CTX days +3, +4   mycophenolate mofetil (Chemo) 1000 milliGRAM(s) Oral three times a day  tacrolimus (Chemo) 1 milliGRAM(s) Oral two times a day    6. Transfuse & replete electrolytes prn     7. IV hydration, daily weights, strict I&O, prn diuresis     8. PO intake as tolerated, nutrition follow up as needed, MVI, folic acid     9. Antiemetics, anti-diarrhea medications:   loperamide 2 milliGRAM(s) Oral every 4 hours PRN  ondansetron Injectable 8 milliGRAM(s) IV Push every 8 hours PRN  prochlorperazine   Injectable 10 milliGRAM(s) IV Push every 6 hours PRN    10. OOB as tolerated, physical therapy consult if needed     11. Monitor coags / fibrinogen 2x week, vitamin K as needed     12. Monitor closely for clinical changes, monitor for fevers     13. Emotional support provided, plan of care discussed and questions addressed     14. Patient education done regarding plan of care, restrictions and discharge planning     15. Continue regular social work input     I have written the above note for Dr. Wilder who performed service with me in the room.   Marilia Benton NP-C ([phone])    I have seen and examined patient with NP, I agree with above note as scribed.        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 () with Flu / Cy / TBI prep regimen for treatment of severe aplastic anemia    S: Patient seen and examined with HPC Transplant Team:   + fatigue   + intermittent nausea     O: Vitals:   Vital Signs Last 24 Hrs  T(C): 36.8 (2023 06:00), Max: 36.8 (15 [facility] 2023 21:37)  T(F): 98.2 (2023 06:00), Max: 98.2 (15 [facility] 2023 21:37)  HR: 80 (2023 06:00) (80 - 86)  BP: 115/72 (2023 06:00) (106/64 - 130/77)  BP(mean): --  RR: 18 (2023 06:00) (18 - 18)  SpO2: 98% (2023 06:00) (96% - 100%)    Parameters below as of 15 [facility] 2023 21:37  Patient On (Oxygen Delivery Method): room air    Admit weight: 102.8 kg  Daily Weight in k.9 (15 [facility] 2023 09:35)  Today's weight: 95.8     Intake / Output:   -15 @ 07:01  -  06-16 @ 07:00  --------------------------------------------------------  IN: 1886 mL / OUT: 2125 mL / NET: -239 mL    PE:   Oropharynx: Clear  Oral Mucositis: (-)                                                        Grade:   CVS: RRR, +S1,S2  Lungs: CTA B/L  Abdomen: Soft, NT, ND, +BS  Extremities: No edema in BLE  Gastric Mucositis: (-)                                                 Grade:   Intestinal Mucositis:   (-)                                           Grade:   Skin: Intact  TLC: CDI  Neuro: Alert, Ox3  Pain: Denies      Labs:   CBC Full  -  ( 2023 07:11 )  WBC Count : 0.04 K/uL  Hemoglobin : 7.6 g/dL  Hematocrit : 21.9 %  Platelet Count - Automated : 8 K/uL  Mean Cell Volume : 93.6 fl  Mean Cell Hemoglobin : 32.5 pg  Mean Cell Hemoglobin Concentration : 34.7 gm/dL  Auto Neutrophil # : x  Auto Lymphocyte # : x  Auto Monocyte # : x  Auto Eosinophil # : x  Auto Basophil # : x  Auto Neutrophil % : x  Auto Lymphocyte % : x  Auto Monocyte % : x  Auto Eosinophil % : x  Auto Basophil % : x                          7.6    0.04  )-----------( 8        ( 2023 07:11 )             21.9         138  |  105  |  10  ----------------------------<  91  4.1   |  21<L>  |  0.59    Ca    8.8      2023 07:06  Phos  2.6       Mg     2.0         TPro  6.7  /  Alb  4.3  /  TBili  0.6  /  DBili  0.1  /  AST  21  /  ALT  39  /  AlkPhos  81  -    PT/INR - ( 15 [facility] 2023 06:49 )   PT: 12.5 sec;   INR: 1.08 ratio         PTT - ( 15 [facility] 2023 06:49 )  PTT:28.3 sec  LIVER FUNCTIONS - ( 2023 07:06 )  Alb: 4.3 g/dL / Pro: 6.7 g/dL / ALK PHOS: 81 U/L / ALT: 39 U/L / AST: 21 U/L / GGT: x           Lactate Dehydrogenase, Serum: 264 U/L ( @ 07:06)    Cultures:   Culture - Blood (06.10.23 @ 21:45)    Specimen Source: .Blood Blood-Catheter   Culture Results:   No Growth Final    Meds:   Antimicrobials:   acyclovir   Oral Tab/Cap 400 milliGRAM(s) Oral every 8 hours  cefepime   IVPB 2000 milliGRAM(s) IV Intermittent every 8 hours  clotrimazole Lozenge 1 Lozenge Oral five times a day  fluconAZOLE   Tablet 400 milliGRAM(s) Oral daily    Heme / Onc:   heparin  Infusion 424 Unit(s)/Hr IV Continuous <Continuous>    GI:  famotidine    Tablet 20 milliGRAM(s) Oral two times a day  loperamide 2 milliGRAM(s) Oral every 4 hours PRN  senna 1 Tablet(s) Oral at bedtime PRN  sodium bicarbonate Mouth Rinse 10 milliLiter(s) Swish and Spit five times a day  ursodiol Capsule 300 milliGRAM(s) Oral two times a day    Immunologic:   filgrastim-sndz (ZARXIO) Injectable (Chemo) 480 MICROGram(s) SubCutaneous every 24 hours  mycophenolate mofetil (Chemo) 1000 milliGRAM(s) Oral three times a day  tacrolimus 2 milliGRAM(s) Oral <User Schedule>    Other medications:   acetaminophen     Tablet .. 650 milliGRAM(s) Oral every 6 hours  Biotene Dry Mouth Oral Rinse 5 milliLiter(s) Swish and Spit five times a day  Chemotherapy Worklist Order      chlorhexidine 4% Liquid 1 Application(s) Topical <User Schedule>  folic acid 1 milliGRAM(s) Oral daily  hydrocortisone 1% Cream 1 Application(s) Topical every 12 hours  Infuse HPC Product      lidocaine/prilocaine Cream (Chemo) 1 Application(s) Topical daily  multivitamin 1 Tablet(s) Oral daily  ondansetron Injectable 8 milliGRAM(s) IV Push every 8 hours  sodium bicarbonate  Infusion (Chemo) 0.074 mEq/kG/Hr IV Continuous <Continuous>  sodium chloride 0.9% 1000 milliLiter(s) IV Continuous <Continuous>  sodium chloride 0.9%. 1000 milliLiter(s) IV Continuous <Continuous>  sodium chloride 0.9%. (Chemo) 1000 milliLiter(s) IV Continuous <Continuous>    PRN:   acetaminophen     Tablet .. 650 milliGRAM(s) Oral every 6 hours PRN  diphenhydrAMINE 25 milliGRAM(s) Oral every 12 hours PRN  loperamide 2 milliGRAM(s) Oral every 4 hours PRN  prochlorperazine   Injectable 10 milliGRAM(s) IV Push every 6 hours PRN  senna 1 Tablet(s) Oral at bedtime PRN  sodium chloride 0.9% lock flush 10 milliLiter(s) IV Push every 1 hour PRN      A/P:  37 year old male with severe aplastic anemia with multiple lines of treatment admitted for a haplo-identical bone marrow transplant with  Flu / Cy / TBI preparative regimen   Day + 8  start CTX hydration - 1/2NS @ 200ml /hr    Strict I&O, daily weights, prn diuresis   Day -6 - day -2 - Fludarabine 30mg/m2 IV  Day -6 - day - 5 - CTX 14.5mg/kg/day IV. CTX to start after urine SG < 1.010. Mesna  12mg / kg - hemorrhagic cystitis ppx   Day -2 - IVIG 0.4 g/kg (ideal body weight) every 3 weeks. Premedication with Tylenol and benadryl   Day -1 -  cGy x 1 dose   Day - 1 - at 2200 begin transplant hydration : 0.45Ns + 1 amp (50mEq) sodium bicarbonate at 150ml /hr; continue transplant hydration for 24 hours post infusion    Day + 2 at 2200 - begin CTX hydration (0.45NS + 10 mEq KCl/ @150ml /m2  continue 24 hours post last dose of CTX   Day + 3 - + 4 - CTX 50mg/kg/day IV. Begin CTX when urine SG < 1.010. EKG daily. Mesna for hemorraghic cystitis ppx.   Day + 5 - start Zarxio,  MMF and Tacrolimus. Check Tacrolimus levels on Monday and Thursday.   when ANC < 500, start Cipro 500mg po BID. If becomes febrile, pan cx, CXR and change Cipro to Cefepime 2g IV q 8 hours. Continue until count recovery  - Fludarabine , intermittent nausea, continue antiemetics   - Fludarabine , IVIG today    TBI today  - HPC transplant today. Continue transplant hydration for 24 hours post infusion of bone marrow   - monitor for CRS / haplo storm - if T >/= 102.5 on days +1 or +2, would dose tocilizumab 8mg / kg IVPB x 1    C.Diff negative, started imodium  - post transplant CTX 2.2 - continue CTX hydration for 24 hours post infusion of last dose , Tosilizumab x1 dose O/N for temp of 39.1.   - Start tacrolimus / MMF today   6/15- persistent nausea, restart zofran ATC     1. Infectious Disease:   acyclovir   Oral Tab/Cap 400 milliGRAM(s) Oral every 8 hours  cefepime   IVPB 2000 milliGRAM(s) IV Intermittent every 8 hours  clotrimazole Lozenge 1 Lozenge Oral five times a day  fluconAZOLE   Tablet 400 milliGRAM(s) Oral daily    2. VOD Prophylaxis: Actigall, Glutamine, Heparin (dosed at 100 units / kg / day)     3. GI Prophylaxis:    famotidine    Tablet 20 milliGRAM(s) Oral two times a day    4. Mouthcare - NS / NaHCO3 rinses, Mycelex, Biotene; Skin care     5. GVHD prophylaxis   TBI day -1   CTX days +3, +4   mycophenolate mofetil (Chemo) 1000 milliGRAM(s) Oral three times a day  tacrolimus (Chemo) 1 milliGRAM(s) Oral two times a day    6. Transfuse & replete electrolytes prn     7. IV hydration, daily weights, strict I&O, prn diuresis     8. PO intake as tolerated, nutrition follow up as needed, MVI, folic acid     9. Antiemetics, anti-diarrhea medications:   loperamide 2 milliGRAM(s) Oral every 4 hours PRN  ondansetron Injectable 8 milliGRAM(s) IV Push every 8 hours PRN  prochlorperazine   Injectable 10 milliGRAM(s) IV Push every 6 hours PRN    10. OOB as tolerated, physical therapy consult if needed     11. Monitor coags / fibrinogen 2x week, vitamin K as needed     12. Monitor closely for clinical changes, monitor for fevers     13. Emotional support provided, plan of care discussed and questions addressed     14. Patient education done regarding plan of care, restrictions and discharge planning     15. Continue regular social work input     I have written the above note for Dr. Wilder who performed service with me in the room.   Marilia Benton NP-C ([phone])    I have seen and examined patient with NP, I agree with above note as scribed.        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 () with Flu / Cy / TBI prep regimen for treatment of severe aplastic anemia    S: Patient seen and examined with HPC Transplant Team:   + fatigue   + intermittent nausea     O: Vitals:   Vital Signs Last 24 Hrs  T(C): 36.8 (2023 06:00), Max: 36.8 (15 [facility] 2023 21:37)  T(F): 98.2 (2023 06:00), Max: 98.2 (15 [place] 2023 21:37)  HR: 80 (2023 06:00) (80 - 86)  BP: 115/72 (2023 06:00) (106/64 - 130/77)  BP(mean): --  RR: 18 (2023 06:00) (18 - 18)  SpO2: 98% (2023 06:00) (96% - 100%)    Parameters below as of 15 [place] 2023 21:37  Patient On (Oxygen Delivery Method): room air    Admit weight: 102.8 kg  Daily Weight in k.9 (15 [place] 2023 09:35)  Today's weight: 95.8     Intake / Output:   -15 @ 07:01  -  06-16 @ 07:00  --------------------------------------------------------  IN: 1886 mL / OUT: 2125 mL / NET: -239 mL    PE:   Oropharynx: Clear  Oral Mucositis: (-)                                                        Grade:   CVS: RRR, +S1,S2  Lungs: CTA B/L  Abdomen: Soft, NT, ND, +BS  Extremities: No edema in BLE  Gastric Mucositis: (-)                                                 Grade:   Intestinal Mucositis:   (-)                                           Grade:   Skin: Intact  TLC: CDI  Neuro: Alert, Ox3  Pain: Denies      Labs:   CBC Full  -  ( 2023 07:11 )  WBC Count : 0.04 K/uL  Hemoglobin : 7.6 g/dL  Hematocrit : 21.9 %  Platelet Count - Automated : 8 K/uL  Mean Cell Volume : 93.6 fl  Mean Cell Hemoglobin : 32.5 pg  Mean Cell Hemoglobin Concentration : 34.7 gm/dL  Auto Neutrophil # : x  Auto Lymphocyte # : x  Auto Monocyte # : x  Auto Eosinophil # : x  Auto Basophil # : x  Auto Neutrophil % : x  Auto Lymphocyte % : x  Auto Monocyte % : x  Auto Eosinophil % : x  Auto Basophil % : x                          7.6    0.04  )-----------( 8        ( 2023 07:11 )             21.9         138  |  105  |  10  ----------------------------<  91  4.1   |  21<L>  |  0.59    Ca    8.8      2023 07:06  Phos  2.6       Mg     2.0         TPro  6.7  /  Alb  4.3  /  TBili  0.6  /  DBili  0.1  /  AST  21  /  ALT  39  /  AlkPhos  81  -    PT/INR - ( 15 [facility] 2023 06:49 )   PT: 12.5 sec;   INR: 1.08 ratio         PTT - ( 15 [place] 2023 06:49 )  PTT:28.3 sec  LIVER FUNCTIONS - ( 2023 07:06 )  Alb: 4.3 g/dL / Pro: 6.7 g/dL / ALK PHOS: 81 U/L / ALT: 39 U/L / AST: 21 U/L / GGT: x           Lactate Dehydrogenase, Serum: 264 U/L ( @ 07:06)    Cultures:   Culture - Blood (06.10.23 @ 21:45)    Specimen Source: .Blood Blood-Catheter   Culture Results:   No Growth Final    Meds:   Antimicrobials:   acyclovir   Oral Tab/Cap 400 milliGRAM(s) Oral every 8 hours  cefepime   IVPB 2000 milliGRAM(s) IV Intermittent every 8 hours  clotrimazole Lozenge 1 Lozenge Oral five times a day  fluconAZOLE   Tablet 400 milliGRAM(s) Oral daily    Heme / Onc:   heparin  Infusion 424 Unit(s)/Hr IV Continuous <Continuous>    GI:  famotidine    Tablet 20 milliGRAM(s) Oral two times a day  loperamide 2 milliGRAM(s) Oral every 4 hours PRN  senna 1 Tablet(s) Oral at bedtime PRN  sodium bicarbonate Mouth Rinse 10 milliLiter(s) Swish and Spit five times a day  ursodiol Capsule 300 milliGRAM(s) Oral two times a day    Immunologic:   filgrastim-sndz (ZARXIO) Injectable (Chemo) 480 MICROGram(s) SubCutaneous every 24 hours  mycophenolate mofetil (Chemo) 1000 milliGRAM(s) Oral three times a day  tacrolimus 2 milliGRAM(s) Oral <User Schedule>    Other medications:   acetaminophen     Tablet .. 650 milliGRAM(s) Oral every 6 hours  Biotene Dry Mouth Oral Rinse 5 milliLiter(s) Swish and Spit five times a day  Chemotherapy Worklist Order      chlorhexidine 4% Liquid 1 Application(s) Topical <User Schedule>  folic acid 1 milliGRAM(s) Oral daily  hydrocortisone 1% Cream 1 Application(s) Topical every 12 hours  Infuse HPC Product      lidocaine/prilocaine Cream (Chemo) 1 Application(s) Topical daily  multivitamin 1 Tablet(s) Oral daily  ondansetron Injectable 8 milliGRAM(s) IV Push every 8 hours  sodium bicarbonate  Infusion (Chemo) 0.074 mEq/kG/Hr IV Continuous <Continuous>  sodium chloride 0.9% 1000 milliLiter(s) IV Continuous <Continuous>  sodium chloride 0.9%. 1000 milliLiter(s) IV Continuous <Continuous>  sodium chloride 0.9%. (Chemo) 1000 milliLiter(s) IV Continuous <Continuous>    PRN:   acetaminophen     Tablet .. 650 milliGRAM(s) Oral every 6 hours PRN  diphenhydrAMINE 25 milliGRAM(s) Oral every 12 hours PRN  loperamide 2 milliGRAM(s) Oral every 4 hours PRN  prochlorperazine   Injectable 10 milliGRAM(s) IV Push every 6 hours PRN  senna 1 Tablet(s) Oral at bedtime PRN  sodium chloride 0.9% lock flush 10 milliLiter(s) IV Push every 1 hour PRN      A/P:  37 year old male with severe aplastic anemia with multiple lines of treatment admitted for a haplo-identical bone marrow transplant with  Flu / Cy / TBI preparative regimen   Day + 8  start CTX hydration - 1/2NS @ 200ml /hr    Strict I&O, daily weights, prn diuresis   Day -6 - day -2 - Fludarabine 30mg/m2 IV  Day -6 - day - 5 - CTX 14.5mg/kg/day IV. CTX to start after urine SG < 1.010. Mesna  12mg / kg - hemorrhagic cystitis ppx   Day -2 - IVIG 0.4 g/kg (ideal body weight) every 3 weeks. Premedication with Tylenol and benadryl   Day -1 -  cGy x 1 dose   Day - 1 - at 2200 begin transplant hydration : 0.45Ns + 1 amp (50mEq) sodium bicarbonate at 150ml /hr; continue transplant hydration for 24 hours post infusion    Day + 2 at 2200 - begin CTX hydration (0.45NS + 10 mEq KCl/ @150ml /m2  continue 24 hours post last dose of CTX   Day + 3 - + 4 - CTX 50mg/kg/day IV. Begin CTX when urine SG < 1.010. EKG daily. [place]na for hemorraghic cystitis ppx.   Day + 5 - start Zarxio,  MMF and Tacrolimus. Check Tacrolimus levels on Monday and Thursday.   when ANC < 500, start Cipro 500mg po BID. If becomes febrile, pan cx, CXR and change Cipro to Cefepime 2g IV q 8 hours. Continue until count recovery  - Fludarabine , intermittent nausea, continue antiemetics   - Fludarabine , IVIG today    TBI today  - HPC transplant today. Continue transplant hydration for 24 hours post infusion of bone marrow   - monitor for CRS / haplo storm - if T >/= 102.5 on days +1 or +2, would dose tocilizumab 8mg / kg IVPB x 1    C.Diff negative, started imodium  - post transplant CTX 2.2 - continue CTX hydration for 24 hours post infusion of last dose , Tosilizumab x1 dose O/N for temp of 39.1.   - Start tacrolimus / MMF today   6/15- persistent nausea, restart zofran ATC     1. Infectious Disease:   acyclovir   Oral Tab/Cap 400 milliGRAM(s) Oral every 8 hours  cefepime   IVPB 2000 milliGRAM(s) IV Intermittent every 8 hours  clotrimazole Lozenge 1 Lozenge Oral five times a day  fluconAZOLE   Tablet 400 milliGRAM(s) Oral daily    2. VOD Prophylaxis: Actigall, Glutamine, Heparin (dosed at 100 units / kg / day)     3. GI Prophylaxis:    famotidine    Tablet 20 milliGRAM(s) Oral two times a day    4. Mouthcare - NS / NaHCO3 rinses, Mycelex, Biotene; Skin care     5. GVHD prophylaxis   TBI day -1   CTX days +3, +4   mycophenolate mofetil (Chemo) 1000 milliGRAM(s) Oral three times a day  tacrolimus (Chemo) 1 milliGRAM(s) Oral two times a day    6. Transfuse & replete electrolytes prn   Thrombocytopenia: Platelets 1 bag today     7. IV hydration, daily weights, strict I&O, prn diuresis     8. PO intake as tolerated, nutrition follow up as needed, MVI, folic acid     9. Antiemetics, anti-diarrhea medications:   loperamide 2 milliGRAM(s) Oral every 4 hours PRN  ondansetron Injectable 8 milliGRAM(s) IV Push every 8 hours PRN  prochlorperazine   Injectable 10 milliGRAM(s) IV Push every 6 hours PRN    10. OOB as tolerated, physical therapy consult if needed     11. Monitor coags / fibrinogen 2x week, vitamin K as needed     12. Monitor closely for clinical changes, monitor for fevers     13. Emotional support provided, plan of care discussed and questions addressed     14. Patient education done regarding plan of care, restrictions and discharge planning     15. Continue regular social work input     I have written the above note for Dr. Wilder who performed service with me in the room.   Marilia Benton NP-C ([phone])    I have seen and examined patient with NP, I agree with above note as scribed.

## 2023-06-16 NOTE — PHARMACOTHERAPY INTERVENTION NOTE - COMMENTS
37-year-old male with severe aplastic anemia previously treated with eATG +CsA + eltrombopag in April 2020. He relapsed November 2021 and was treated with rATG + CSA + prednisone + eltombopag and then ravulizumab. He is here for a haploidentical allogeneic stem cell transplant, today is day +8. Course has been complicated by intermittent nausea. Patient is on ondansetron 8 mg every 8 hours IV and I encouraged him to request as needed nausea medication. 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 Most recent EKG on 6/12 had a QTc of 427.    Asmita Carranza, PharmD, [facility]  Stem Cell Transplant Clinical Pharmacy Specialist

## 2023-06-17 LAB
ALBUMIN SERPL ELPH-MCNC: 4.2 G/DL — SIGNIFICANT CHANGE UP (ref 3.3–5)
ALP SERPL-CCNC: 76 U/L — SIGNIFICANT CHANGE UP (ref 40–120)
ALT FLD-CCNC: 34 U/L — SIGNIFICANT CHANGE UP (ref 10–45)
ANION GAP SERPL CALC-SCNC: 10 MMOL/L — SIGNIFICANT CHANGE UP (ref 5–17)
AST SERPL-CCNC: 17 U/L — SIGNIFICANT CHANGE UP (ref 10–40)
BILIRUB SERPL-MCNC: 0.5 MG/DL — SIGNIFICANT CHANGE UP (ref 0.2–1.2)
BUN SERPL-MCNC: 11 MG/DL — SIGNIFICANT CHANGE UP (ref 7–23)
CALCIUM SERPL-MCNC: 8.7 MG/DL — SIGNIFICANT CHANGE UP (ref 8.4–10.5)
CHLORIDE SERPL-SCNC: 106 MMOL/L — SIGNIFICANT CHANGE UP (ref 96–108)
CO2 SERPL-SCNC: 23 MMOL/L — SIGNIFICANT CHANGE UP (ref 22–31)
CREAT SERPL-MCNC: 0.62 MG/DL — SIGNIFICANT CHANGE UP (ref 0.5–1.3)
EGFR: 128 ML/MIN/1.73M2 — SIGNIFICANT CHANGE UP
GLUCOSE SERPL-MCNC: 91 MG/DL — SIGNIFICANT CHANGE UP (ref 70–99)
HCT VFR BLD CALC: 19.1 % — CRITICAL LOW (ref 39–50)
HGB BLD-MCNC: 6.7 G/DL — CRITICAL LOW (ref 13–17)
LDH SERPL L TO P-CCNC: 250 U/L — HIGH (ref 50–242)
MAGNESIUM SERPL-MCNC: 1.9 MG/DL — SIGNIFICANT CHANGE UP (ref 1.6–2.6)
MCHC RBC-ENTMCNC: 32.2 PG — SIGNIFICANT CHANGE UP (ref 27–34)
MCHC RBC-ENTMCNC: 35.1 GM/DL — SIGNIFICANT CHANGE UP (ref 32–36)
MCV RBC AUTO: 91.8 FL — SIGNIFICANT CHANGE UP (ref 80–100)
NRBC # BLD: 0 /100 WBCS — SIGNIFICANT CHANGE UP (ref 0–0)
PHOSPHATE SERPL-MCNC: 3.1 MG/DL — SIGNIFICANT CHANGE UP (ref 2.5–4.5)
PLATELET # BLD AUTO: 12 K/UL — CRITICAL LOW (ref 150–400)
POTASSIUM SERPL-MCNC: 4.1 MMOL/L — SIGNIFICANT CHANGE UP (ref 3.5–5.3)
POTASSIUM SERPL-SCNC: 4.1 MMOL/L — SIGNIFICANT CHANGE UP (ref 3.5–5.3)
PROT SERPL-MCNC: 6.6 G/DL — SIGNIFICANT CHANGE UP (ref 6–8.3)
RBC # BLD: 2.08 M/UL — LOW (ref 4.2–5.8)
RBC # FLD: 13.9 % — SIGNIFICANT CHANGE UP (ref 10.3–14.5)
SODIUM SERPL-SCNC: 139 MMOL/L — SIGNIFICANT CHANGE UP (ref 135–145)
WBC # BLD: 0.02 K/UL — CRITICAL LOW (ref 3.8–10.5)
WBC # FLD AUTO: 0.02 K/UL — CRITICAL LOW (ref 3.8–10.5)

## 2023-06-17 PROCEDURE — 99291 CRITICAL CARE FIRST HOUR: CPT

## 2023-06-17 RX ADMIN — Medication 10 MILLILITER(S): at 16:33

## 2023-06-17 RX ADMIN — ONDANSETRON 8 MILLIGRAM(S): 8 TABLET, FILM COATED ORAL at 05:25

## 2023-06-17 RX ADMIN — Medication 400 MILLIGRAM(S): at 21:00

## 2023-06-17 RX ADMIN — LIDOCAINE AND PRILOCAINE CREAM 1 APPLICATION(S): 25; 25 CREAM TOPICAL at 12:23

## 2023-06-17 RX ADMIN — Medication 5 MILLILITER(S): at 23:05

## 2023-06-17 RX ADMIN — HEPARIN SODIUM 4.24 UNIT(S)/HR: 5000 INJECTION INTRAVENOUS; SUBCUTANEOUS at 20:56

## 2023-06-17 RX ADMIN — MYCOPHENOLATE MOFETIL 1000 MILLIGRAM(S): 250 CAPSULE ORAL at 05:26

## 2023-06-17 RX ADMIN — Medication 480 MICROGRAM(S): at 12:25

## 2023-06-17 RX ADMIN — Medication 5 MILLILITER(S): at 07:52

## 2023-06-17 RX ADMIN — Medication 1 MILLIGRAM(S): at 12:20

## 2023-06-17 RX ADMIN — Medication 10 MILLILITER(S): at 12:20

## 2023-06-17 RX ADMIN — SODIUM CHLORIDE 20 MILLILITER(S): 9 INJECTION INTRAMUSCULAR; INTRAVENOUS; SUBCUTANEOUS at 20:55

## 2023-06-17 RX ADMIN — Medication 5 MILLILITER(S): at 20:55

## 2023-06-17 RX ADMIN — URSODIOL 300 MILLIGRAM(S): 250 TABLET, FILM COATED ORAL at 05:25

## 2023-06-17 RX ADMIN — URSODIOL 300 MILLIGRAM(S): 250 TABLET, FILM COATED ORAL at 17:39

## 2023-06-17 RX ADMIN — ONDANSETRON 8 MILLIGRAM(S): 8 TABLET, FILM COATED ORAL at 21:01

## 2023-06-17 RX ADMIN — CEFEPIME 100 MILLIGRAM(S): 1 INJECTION, POWDER, FOR SOLUTION INTRAMUSCULAR; INTRAVENOUS at 13:17

## 2023-06-17 RX ADMIN — Medication 1 LOZENGE: at 20:55

## 2023-06-17 RX ADMIN — Medication 10 MILLILITER(S): at 07:52

## 2023-06-17 RX ADMIN — Medication 2 MILLIGRAM(S): at 01:10

## 2023-06-17 RX ADMIN — Medication 1 LOZENGE: at 16:33

## 2023-06-17 RX ADMIN — Medication 1 APPLICATION(S): at 17:40

## 2023-06-17 RX ADMIN — Medication 10 MILLILITER(S): at 23:05

## 2023-06-17 RX ADMIN — Medication 1 LOZENGE: at 12:20

## 2023-06-17 RX ADMIN — CHLORHEXIDINE GLUCONATE 1 APPLICATION(S): 213 SOLUTION TOPICAL at 08:00

## 2023-06-17 RX ADMIN — Medication 400 MILLIGRAM(S): at 05:24

## 2023-06-17 RX ADMIN — Medication 10 MILLILITER(S): at 20:55

## 2023-06-17 RX ADMIN — Medication 5 MILLILITER(S): at 12:21

## 2023-06-17 RX ADMIN — Medication 400 MILLIGRAM(S): at 13:16

## 2023-06-17 RX ADMIN — FAMOTIDINE 20 MILLIGRAM(S): 10 INJECTION INTRAVENOUS at 05:25

## 2023-06-17 RX ADMIN — CEFEPIME 100 MILLIGRAM(S): 1 INJECTION, POWDER, FOR SOLUTION INTRAMUSCULAR; INTRAVENOUS at 21:01

## 2023-06-17 RX ADMIN — Medication 5 MILLILITER(S): at 16:33

## 2023-06-17 RX ADMIN — TACROLIMUS 2 MILLIGRAM(S): 5 CAPSULE ORAL at 19:56

## 2023-06-17 RX ADMIN — TACROLIMUS 2 MILLIGRAM(S): 5 CAPSULE ORAL at 05:27

## 2023-06-17 RX ADMIN — Medication 1 APPLICATION(S): at 05:25

## 2023-06-17 RX ADMIN — MYCOPHENOLATE MOFETIL 1000 MILLIGRAM(S): 250 CAPSULE ORAL at 21:00

## 2023-06-17 RX ADMIN — Medication 1 LOZENGE: at 23:05

## 2023-06-17 RX ADMIN — CEFEPIME 100 MILLIGRAM(S): 1 INJECTION, POWDER, FOR SOLUTION INTRAMUSCULAR; INTRAVENOUS at 05:25

## 2023-06-17 RX ADMIN — FAMOTIDINE 20 MILLIGRAM(S): 10 INJECTION INTRAVENOUS at 17:39

## 2023-06-17 RX ADMIN — ONDANSETRON 8 MILLIGRAM(S): 8 TABLET, FILM COATED ORAL at 13:16

## 2023-06-17 RX ADMIN — FLUCONAZOLE 400 MILLIGRAM(S): 150 TABLET ORAL at 12:21

## 2023-06-17 RX ADMIN — Medication 1 TABLET(S): at 12:21

## 2023-06-17 RX ADMIN — MYCOPHENOLATE MOFETIL 1000 MILLIGRAM(S): 250 CAPSULE ORAL at 13:16

## 2023-06-17 RX ADMIN — Medication 1 LOZENGE: at 07:52

## 2023-06-17 NOTE — PROGRESS NOTE ADULT - NS ATTEND AMEND GEN_ALL_CORE FT
1. Admit to BMTU Today is day + 8  2. TLC placement in IR   3. Day -6 - day + 5 - antiemetics   4. Day - 6 start CTX hydration - 1/2NS @ 200ml /hr    5. Strict I&O, daily weights, prn diuresis   6. Day -6 - day -2 - Fludarabine 30mg/m2 IV  7. Day -6 - day - 5 - CTX 14.5mg/kg/day IV. CTX to start after urine SG < 1.010. Mesna  12mg / kg - hemorrhagic cystitis ppx   8. Day -2 - IVIG 0.4 g/kg (ideal body weight) every 3 weeks. Premedication with Tylenol and benadryl  9. Day -1 -  cGy x 1 dose   10. Day - 1 - at 2200 begin transplant hydration : 0.45Ns + 1 amp (50mEq) sodium bicarbonate at 150ml /hr; continue transplant hydration for 24 hours post infusion   11. Day 0 – HPC transplant   12. Day + 2 at 2200 - begin CTX hydration (0.45NS + 10 mEq KCl/ @150ml /m2  continue 24 hours post last dose of CTX   13. Day + 3 - + 4 - CTX 50mg/kg/day IV. Begin CTX when urine SG < 1.010. EKG daily. Mesna for hemorraghic cystitis ppx.   14. Day + 5 - start Zarxio,  MMF and Tacrolimus. Check Tacrolimus levels on Monday and Thursday.   15. Anxiolytics, antinausea, antidiarrhea medications as needed   16. Lasix PRN while being aggressively hydrated to avoid VOD   17. Nutritional support, pain management  Need for prophylactic measures:  1. VOD prophylaxis - low dose heparin gtt (dosed at 100 units / kg / day), glutamine supplementation, Actigall BID   2. PCP prophylaxis - Bactrim DS through day -2    3. Antiviral prophylaxis - Continue Acyclovir   4. Antifungal prophylaxis- Diflucan 400 mg po daily.  5.. GI prophylaxis - Protonix po QD   6. Antibacterial prophylaxis -  ANC < 500, started Cipro 500mg po BID. If becomes febrile, pan cx, CXR and change Cipro to Cefepime 2g IV q 8 hours. Continue until count recovery..slight rise in wbc noted after infusion of marrow  7. Aggressive mouth care and skin care as per protocol  8. GVHD prophylaxis- high dose CTX; MMF and Tacrolimus.  9. transfusion and electrolyte support    Patient with hemoglobin drop of ~2  on 6/4, likely due to chemotherapy however will repeat CBC tonight to establish stability, likely will require transfusion.  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  6/11 Febrile to 100.6F. Cipro switched to cefepime. Infectious work up ordered.  6/12 day 4 ctx today...given toci for haplo storm..some loose bowel, cdiff neg  6/13 mmf, tacro and zarxio.  6/16 overall doing well..on tacro 2 mg..repeat on monday..goal 5 to 10 1. Admit to BMTU Today is day + 9  2. TLC placement in IR   3. Day -6 - day + 5 - antiemetics   4. Day - 6 start CTX hydration - 1/2NS @ 200ml /hr    5. Strict I&O, daily weights, prn diuresis   6. Day -6 - day -2 - Fludarabine 30mg/m2 IV  7. Day -6 - day - 5 - CTX 14.5mg/kg/day IV. CTX to start after urine SG < 1.010. Mesna  12mg / kg - hemorrhagic cystitis ppx   8. Day -2 - IVIG 0.4 g/kg (ideal body weight) every 3 weeks. Premedication with Tylenol and benadryl  9. Day -1 -  cGy x 1 dose   10. Day - 1 - at 2200 begin transplant hydration : 0.45Ns + 1 amp (50mEq) sodium bicarbonate at 150ml /hr; continue transplant hydration for 24 hours post infusion   11. Day 0 – HPC transplant   12. Day + 2 at 2200 - begin CTX hydration (0.45NS + 10 mEq KCl/ @150ml /m2  continue 24 hours post last dose of CTX   13. Day + 3 - + 4 - CTX 50mg/kg/day IV. Begin CTX when urine SG < 1.010. EKG daily. Mesna for hemorraghic cystitis ppx.   14. Day + 5 - start Zarxio,  MMF and Tacrolimus. Check Tacrolimus levels on Monday and Thursday.   15. Anxiolytics, antinausea, antidiarrhea medications as needed   16. Lasix PRN while being aggressively hydrated to avoid VOD   17. Nutritional support, pain management  Need for prophylactic measures:  1. VOD prophylaxis - low dose heparin gtt (dosed at 100 units / kg / day), glutamine supplementation, Actigall BID   2. PCP prophylaxis - Bactrim DS through day -2    3. Antiviral prophylaxis - Continue Acyclovir   4. Antifungal prophylaxis- Diflucan 400 mg po daily.  5.. GI prophylaxis - Protonix po QD   6. Antibacterial prophylaxis -  ANC < 500, started Cipro 500mg po BID. If becomes febrile, pan cx, CXR and change Cipro to Cefepime 2g IV q 8 hours. Continue until count recovery..slight rise in wbc noted after infusion of marrow  7. Aggressive mouth care and skin care as per protocol  8. GVHD prophylaxis- high dose CTX; MMF and Tacrolimus.  9. transfusion and electrolyte support    Patient with hemoglobin drop of ~2  on 6/4, likely due to chemotherapy however will repeat CBC tonight to establish stability, likely will require transfusion.  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  6/11 Febrile to 100.6F. Cipro switched to cefepime. Infectious work up ordered.  6/12 day 4 ctx today...given toci for haplo storm..some loose bowel, cdiff neg  6/13 mmf, tacro and zarxio.  6/16 overall doing well..on tacro 2 mg..repeat on monday..goal 5 to 10

## 2023-06-17 NOTE — PROGRESS NOTE ADULT - SUBJECTIVE AND OBJECTIVE BOX
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 () with Flu / Cy / TBI prep regimen for treatment of severe aplastic anemia    S: Patient seen and examined with HPC Transplant Team:   + fatigue   All other ROS negative     O: Vitals:   Vital Signs Last 24 Hrs  T(C): 36.5 (2023 10:50), Max: 36.9 (2023 21:16)  T(F): 97.7 (2023 10:50), Max: 98.4 (2023 21:16)  HR: 82 (2023 10:50) (82 - 103)  BP: 107/66 (2023 10:50) (107/66 - 150/91)  BP(mean): --  RR: 18 (2023 10:50) (18 - 19)  SpO2: 99% (2023 10:50) (96% - 99%)    Parameters below as of 2023 05:30  Patient On (Oxygen Delivery Method): room air        Admit weight: 102.8 kg    Daily Weight in k.7 (2023 10:20)    Intake / Output:    @ 07: @ 07:00  --------------------------------------------------------  IN: 2936.1 mL / OUT: 2195 mL / NET: 741.1 mL     @ 07: @ 14:35  --------------------------------------------------------  IN: 1305 mL / OUT: 1100 mL / NET: 205 mL    PE:   Oropharynx: Clear  Oral Mucositis: (-)                                                        Grade:   CVS: RRR, +S1,S2  Lungs: CTA B/L  Abdomen: Soft, NT, ND, +BS  Extremities: No edema in BLE  Gastric Mucositis: (-)                                                 Grade:   Intestinal Mucositis:   (-)                                           Grade:   Skin: Intact  TLC: CDI  Neuro: Alert, Ox3  Pain: Denies          Labs:   CBC Full  -  ( 2023 07:25 )  WBC Count : 0.02 K/uL  Hemoglobin : 6.7 g/dL  Hematocrit : 19.1 %  Platelet Count - Automated : 12 K/uL  Mean Cell Volume : 91.8 fl  Mean Cell Hemoglobin : 32.2 pg  Mean Cell Hemoglobin Concentration : 35.1 gm/dL  Auto Neutrophil # : x  Auto Lymphocyte # : x  Auto Monocyte # : x  Auto Eosinophil # : x  Auto Basophil # : x  Auto Neutrophil % : x  Auto Lymphocyte % : x  Auto Monocyte % : x  Auto Eosinophil % : x  Auto Basophil % : x                          6.7    0.02  )-----------( 12       ( 2023 07:25 )             19.1         139  |  106  |  11  ----------------------------<  91  4.1   |  23  |  0.62    Ca    8.7      2023 07:25  Phos  3.1       Mg     1.9         TPro  6.6  /  Alb  4.2  /  TBili  0.5  /  DBili  x   /  AST  17  /  ALT  34  /  AlkPhos  76        LIVER FUNCTIONS - ( 2023 07:25 )  Alb: 4.2 g/dL / Pro: 6.6 g/dL / ALK PHOS: 76 U/L / ALT: 34 U/L / AST: 17 U/L / GGT: x           Lactate Dehydrogenase, Serum: 250 U/L ( @ 07:25)            Meds:   Antimicrobials:   acyclovir   Oral Tab/Cap 400 milliGRAM(s) Oral every 8 hours  cefepime   IVPB 2000 milliGRAM(s) IV Intermittent every 8 hours  cefepime   IVPB      clotrimazole Lozenge 1 Lozenge Oral five times a day  fluconAZOLE   Tablet 400 milliGRAM(s) Oral daily      Heme / Onc:   heparin  Infusion 424 Unit(s)/Hr IV Continuous <Continuous>      GI:  famotidine    Tablet 20 milliGRAM(s) Oral two times a day  loperamide 2 milliGRAM(s) Oral every 4 hours PRN  senna 1 Tablet(s) Oral at bedtime PRN  sodium bicarbonate Mouth Rinse 10 milliLiter(s) Swish and Spit five times a day  ursodiol Capsule 300 milliGRAM(s) Oral two times a day      Cardiovascular:       Immunologic:   filgrastim-sndz (ZARXIO) Injectable (Chemo) 480 MICROGram(s) SubCutaneous every 24 hours  mycophenolate mofetil (Chemo) 1000 milliGRAM(s) Oral three times a day  tacrolimus 2 milliGRAM(s) Oral <User Schedule>      Other medications:   acetaminophen     Tablet .. 650 milliGRAM(s) Oral every 6 hours  Biotene Dry Mouth Oral Rinse 5 milliLiter(s) Swish and Spit five times a day  Chemotherapy Worklist Order      chlorhexidine 4% Liquid 1 Application(s) Topical <User Schedule>  folic acid 1 milliGRAM(s) Oral daily  hydrocortisone 1% Cream 1 Application(s) Topical every 12 hours  Infuse HPC Product      lidocaine/prilocaine Cream (Chemo) 1 Application(s) Topical daily  multivitamin 1 Tablet(s) Oral daily  ondansetron Injectable 8 milliGRAM(s) IV Push every 8 hours  sodium bicarbonate  Infusion (Chemo) 0.074 mEq/kG/Hr IV Continuous <Continuous>  sodium chloride 0.9% 1000 milliLiter(s) IV Continuous <Continuous>  sodium chloride 0.9%. 1000 milliLiter(s) IV Continuous <Continuous>  sodium chloride 0.9%. (Chemo) 1000 milliLiter(s) IV Continuous <Continuous>      PRN:   acetaminophen     Tablet .. 650 milliGRAM(s) Oral every 6 hours PRN  diphenhydrAMINE 25 milliGRAM(s) Oral every 12 hours PRN  loperamide 2 milliGRAM(s) Oral every 4 hours PRN  prochlorperazine   Injectable 10 milliGRAM(s) IV Push every 6 hours PRN  senna 1 Tablet(s) Oral at bedtime PRN  sodium chloride 0.9% lock flush 10 milliLiter(s) IV Push every 1 hour PRN    A/P:  37 year old male with severe aplastic anemia with multiple lines of treatment admitted for a haplo-identical bone marrow transplant with  Flu / Cy / TBI preparative regimen   Day + 9  start CTX hydration - 1/2NS @ 200ml /hr    Strict I&O, daily weights, prn diuresis   Day -6 - day -2 - Fludarabine 30mg/m2 IV  Day -6 - day - 5 - CTX 14.5mg/kg/day IV. CTX to start after urine SG < 1.010. Mesna  12mg / kg - hemorrhagic cystitis ppx   Day -2 - IVIG 0.4 g/kg (ideal body weight) every 3 weeks. Premedication with Tylenol and benadryl   Day -1 -  cGy x 1 dose   Day - 1 - at 2200 begin transplant hydration : 0.45Ns + 1 amp (50mEq) sodium bicarbonate at 150ml /hr; continue transplant hydration for 24 hours post infusion    Day + 2 at 2200 - begin CTX hydration (0.45NS + 10 mEq KCl/ @150ml /m2  continue 24 hours post last dose of CTX   Day + 3 - + 4 - CTX 50mg/kg/day IV. Begin CTX when urine SG < 1.010. EKG daily. Mesna for hemorraghic cystitis ppx.   Day + 5 - start Zarxio,  MMF and Tacrolimus. Check Tacrolimus levels on Monday and Thursday.   when ANC < 500, start Cipro 500mg po BID. If becomes febrile, pan cx, CXR and change Cipro to Cefepime 2g IV q 8 hours. Continue until count recovery  - Fludarabine , intermittent nausea, continue antiemetics   - Fludarabine , IVIG today    TBI today  - HPC transplant today. Continue transplant hydration for 24 hours post infusion of bone marrow   - monitor for CRS / haplo storm - if T >/= 102.5 on days +1 or +2, would dose tocilizumab 8mg / kg IVPB x 1    C.Diff negative, started imodium  - post transplant CTX 2.2 - continue CTX hydration for 24 hours post infusion of last dose , Tosilizumab x1 dose O/N for temp of 39.1.   - Start tacrolimus / MMF today   6/15- persistent nausea, restart zofran ATC     1. Infectious Disease:   acyclovir   Oral Tab/Cap 400 milliGRAM(s) Oral every 8 hours  cefepime   IVPB 2000 milliGRAM(s) IV Intermittent every 8 hours  cefepime   IVPB      clotrimazole Lozenge 1 Lozenge Oral five times a day  fluconAZOLE   Tablet 400 milliGRAM(s) Oral daily  2. VOD Prophylaxis: Actigall, Glutamine, Heparin (dosed at 100 units / kg / day)     3. GI Prophylaxis:    famotidine    Tablet 20 milliGRAM(s) Oral two times a day    4. Mouthcare - NS / NaHCO3 rinses, Mycelex, Biotene; Skin care     5. GVHD prophylaxis   TBI day -1   CTX days +3, +4   mycophenolate mofetil (Chemo) 1000 milliGRAM(s) Oral three times a day  tacrolimus (Chemo) 2 milliGRAM(s) Oral two times a day    6. Transfuse & replete electrolytes prn   Anemia PRBC x 1 unit     7. IV hydration, daily weights, strict I&O, prn diuresis     8. PO intake as tolerated, nutrition follow up as needed, MVI, folic acid     9. Antiemetics, anti-diarrhea medications:   loperamide 2 milliGRAM(s) Oral every 4 hours PRN  ondansetron Injectable 8 milliGRAM(s) IV Push every 8 hours PRN  prochlorperazine   Injectable 10 milliGRAM(s) IV Push every 6 hours PRN    10. OOB as tolerated, physical therapy consult if needed     11. Monitor coags / fibrinogen 2x week, vitamin K as needed     12. Monitor closely for clinical changes, monitor for fevers     13. Emotional support provided, plan of care discussed and questions addressed     14. Patient education done regarding plan of care, restrictions and discharge planning     15. Continue regular social work input     I have written the above note for Dr. Ace  who performed service with me in the room.   Ioana Lucia  NP-C ([phone])    I have seen and examined patient with NP, I agree with above note as scribed.

## 2023-06-18 LAB
ALBUMIN SERPL ELPH-MCNC: 4.3 G/DL — SIGNIFICANT CHANGE UP (ref 3.3–5)
ALP SERPL-CCNC: 75 U/L — SIGNIFICANT CHANGE UP (ref 40–120)
ALT FLD-CCNC: 32 U/L — SIGNIFICANT CHANGE UP (ref 10–45)
ANION GAP SERPL CALC-SCNC: 15 MMOL/L — SIGNIFICANT CHANGE UP (ref 5–17)
AST SERPL-CCNC: 14 U/L — SIGNIFICANT CHANGE UP (ref 10–40)
BILIRUB SERPL-MCNC: 0.6 MG/DL — SIGNIFICANT CHANGE UP (ref 0.2–1.2)
BUN SERPL-MCNC: 12 MG/DL — SIGNIFICANT CHANGE UP (ref 7–23)
CALCIUM SERPL-MCNC: 8.9 MG/DL — SIGNIFICANT CHANGE UP (ref 8.4–10.5)
CHLORIDE SERPL-SCNC: 103 MMOL/L — SIGNIFICANT CHANGE UP (ref 96–108)
CO2 SERPL-SCNC: 23 MMOL/L — SIGNIFICANT CHANGE UP (ref 22–31)
CREAT SERPL-MCNC: 0.62 MG/DL — SIGNIFICANT CHANGE UP (ref 0.5–1.3)
EGFR: 128 ML/MIN/1.73M2 — SIGNIFICANT CHANGE UP
GLUCOSE SERPL-MCNC: 91 MG/DL — SIGNIFICANT CHANGE UP (ref 70–99)
HCT VFR BLD CALC: 21.5 % — LOW (ref 39–50)
HGB BLD-MCNC: 7.6 G/DL — LOW (ref 13–17)
LDH SERPL L TO P-CCNC: 182 U/L — SIGNIFICANT CHANGE UP (ref 50–242)
MAGNESIUM SERPL-MCNC: 1.8 MG/DL — SIGNIFICANT CHANGE UP (ref 1.6–2.6)
MCHC RBC-ENTMCNC: 31.3 PG — SIGNIFICANT CHANGE UP (ref 27–34)
MCHC RBC-ENTMCNC: 35.3 GM/DL — SIGNIFICANT CHANGE UP (ref 32–36)
MCV RBC AUTO: 88.5 FL — SIGNIFICANT CHANGE UP (ref 80–100)
NRBC # BLD: 0 /100 WBCS — SIGNIFICANT CHANGE UP (ref 0–0)
PHOSPHATE SERPL-MCNC: 3 MG/DL — SIGNIFICANT CHANGE UP (ref 2.5–4.5)
PLATELET # BLD AUTO: 7 K/UL — CRITICAL LOW (ref 150–400)
POTASSIUM SERPL-MCNC: 3.9 MMOL/L — SIGNIFICANT CHANGE UP (ref 3.5–5.3)
POTASSIUM SERPL-SCNC: 3.9 MMOL/L — SIGNIFICANT CHANGE UP (ref 3.5–5.3)
PROT SERPL-MCNC: 6.6 G/DL — SIGNIFICANT CHANGE UP (ref 6–8.3)
RBC # BLD: 2.43 M/UL — LOW (ref 4.2–5.8)
RBC # FLD: 14.5 % — SIGNIFICANT CHANGE UP (ref 10.3–14.5)
SODIUM SERPL-SCNC: 141 MMOL/L — SIGNIFICANT CHANGE UP (ref 135–145)
WBC # BLD: 0.01 K/UL — CRITICAL LOW (ref 3.8–10.5)
WBC # FLD AUTO: 0.01 K/UL — CRITICAL LOW (ref 3.8–10.5)

## 2023-06-18 PROCEDURE — 99291 CRITICAL CARE FIRST HOUR: CPT

## 2023-06-18 RX ORDER — MAGNESIUM SULFATE 500 MG/ML
2 VIAL (ML) INJECTION ONCE
Refills: 0 | Status: COMPLETED | OUTPATIENT
Start: 2023-06-18 | End: 2023-06-18

## 2023-06-18 RX ADMIN — Medication 10 MILLILITER(S): at 16:08

## 2023-06-18 RX ADMIN — URSODIOL 300 MILLIGRAM(S): 250 TABLET, FILM COATED ORAL at 17:48

## 2023-06-18 RX ADMIN — HEPARIN SODIUM 4.24 UNIT(S)/HR: 5000 INJECTION INTRAVENOUS; SUBCUTANEOUS at 20:06

## 2023-06-18 RX ADMIN — Medication 5 MILLILITER(S): at 20:05

## 2023-06-18 RX ADMIN — Medication 10 MILLILITER(S): at 20:06

## 2023-06-18 RX ADMIN — SODIUM CHLORIDE 20 MILLILITER(S): 9 INJECTION INTRAMUSCULAR; INTRAVENOUS; SUBCUTANEOUS at 20:07

## 2023-06-18 RX ADMIN — Medication 400 MILLIGRAM(S): at 05:21

## 2023-06-18 RX ADMIN — Medication 1 MILLIGRAM(S): at 13:02

## 2023-06-18 RX ADMIN — FAMOTIDINE 20 MILLIGRAM(S): 10 INJECTION INTRAVENOUS at 05:21

## 2023-06-18 RX ADMIN — Medication 400 MILLIGRAM(S): at 13:01

## 2023-06-18 RX ADMIN — URSODIOL 300 MILLIGRAM(S): 250 TABLET, FILM COATED ORAL at 05:22

## 2023-06-18 RX ADMIN — LIDOCAINE AND PRILOCAINE CREAM 1 APPLICATION(S): 25; 25 CREAM TOPICAL at 13:06

## 2023-06-18 RX ADMIN — FLUCONAZOLE 400 MILLIGRAM(S): 150 TABLET ORAL at 13:02

## 2023-06-18 RX ADMIN — TACROLIMUS 2 MILLIGRAM(S): 5 CAPSULE ORAL at 20:05

## 2023-06-18 RX ADMIN — MYCOPHENOLATE MOFETIL 1000 MILLIGRAM(S): 250 CAPSULE ORAL at 13:03

## 2023-06-18 RX ADMIN — ONDANSETRON 8 MILLIGRAM(S): 8 TABLET, FILM COATED ORAL at 20:48

## 2023-06-18 RX ADMIN — Medication 5 MILLILITER(S): at 23:02

## 2023-06-18 RX ADMIN — Medication 480 MICROGRAM(S): at 13:05

## 2023-06-18 RX ADMIN — CEFEPIME 100 MILLIGRAM(S): 1 INJECTION, POWDER, FOR SOLUTION INTRAMUSCULAR; INTRAVENOUS at 20:48

## 2023-06-18 RX ADMIN — ONDANSETRON 8 MILLIGRAM(S): 8 TABLET, FILM COATED ORAL at 13:01

## 2023-06-18 RX ADMIN — Medication 1 APPLICATION(S): at 05:21

## 2023-06-18 RX ADMIN — CEFEPIME 100 MILLIGRAM(S): 1 INJECTION, POWDER, FOR SOLUTION INTRAMUSCULAR; INTRAVENOUS at 13:01

## 2023-06-18 RX ADMIN — MYCOPHENOLATE MOFETIL 1000 MILLIGRAM(S): 250 CAPSULE ORAL at 05:22

## 2023-06-18 RX ADMIN — Medication 10 MILLILITER(S): at 13:00

## 2023-06-18 RX ADMIN — MYCOPHENOLATE MOFETIL 1000 MILLIGRAM(S): 250 CAPSULE ORAL at 20:47

## 2023-06-18 RX ADMIN — Medication 1 LOZENGE: at 16:08

## 2023-06-18 RX ADMIN — Medication 1 TABLET(S): at 13:02

## 2023-06-18 RX ADMIN — TACROLIMUS 2 MILLIGRAM(S): 5 CAPSULE ORAL at 05:23

## 2023-06-18 RX ADMIN — Medication 1 LOZENGE: at 13:01

## 2023-06-18 RX ADMIN — Medication 10 MILLILITER(S): at 23:03

## 2023-06-18 RX ADMIN — Medication 400 MILLIGRAM(S): at 20:47

## 2023-06-18 RX ADMIN — Medication 5 MILLILITER(S): at 08:21

## 2023-06-18 RX ADMIN — Medication 25 GRAM(S): at 13:07

## 2023-06-18 RX ADMIN — Medication 10 MILLILITER(S): at 08:21

## 2023-06-18 RX ADMIN — Medication 1 LOZENGE: at 08:22

## 2023-06-18 RX ADMIN — FAMOTIDINE 20 MILLIGRAM(S): 10 INJECTION INTRAVENOUS at 17:48

## 2023-06-18 RX ADMIN — Medication 1 LOZENGE: at 23:02

## 2023-06-18 RX ADMIN — CEFEPIME 100 MILLIGRAM(S): 1 INJECTION, POWDER, FOR SOLUTION INTRAMUSCULAR; INTRAVENOUS at 05:21

## 2023-06-18 RX ADMIN — Medication 5 MILLILITER(S): at 16:08

## 2023-06-18 RX ADMIN — Medication 5 MILLILITER(S): at 13:00

## 2023-06-18 RX ADMIN — Medication 1 LOZENGE: at 20:06

## 2023-06-18 RX ADMIN — CHLORHEXIDINE GLUCONATE 1 APPLICATION(S): 213 SOLUTION TOPICAL at 08:21

## 2023-06-18 RX ADMIN — ONDANSETRON 8 MILLIGRAM(S): 8 TABLET, FILM COATED ORAL at 05:21

## 2023-06-18 RX ADMIN — Medication 1 APPLICATION(S): at 17:48

## 2023-06-18 NOTE — PROGRESS NOTE ADULT - NS ATTEND AMEND GEN_ALL_CORE FT
1. Admit to BMTU Today is day + 9  2. TLC placement in IR   3. Day -6 - day + 5 - antiemetics   4. Day - 6 start CTX hydration - 1/2NS @ 200ml /hr    5. Strict I&O, daily weights, prn diuresis   6. Day -6 - day -2 - Fludarabine 30mg/m2 IV  7. Day -6 - day - 5 - CTX 14.5mg/kg/day IV. CTX to start after urine SG < 1.010. Mesna  12mg / kg - hemorrhagic cystitis ppx   8. Day -2 - IVIG 0.4 g/kg (ideal body weight) every 3 weeks. Premedication with Tylenol and benadryl  9. Day -1 -  cGy x 1 dose   10. Day - 1 - at 2200 begin transplant hydration : 0.45Ns + 1 amp (50mEq) sodium bicarbonate at 150ml /hr; continue transplant hydration for 24 hours post infusion   11. Day 0 – HPC transplant   12. Day + 2 at 2200 - begin CTX hydration (0.45NS + 10 mEq KCl/ @150ml /m2  continue 24 hours post last dose of CTX   13. Day + 3 - + 4 - CTX 50mg/kg/day IV. Begin CTX when urine SG < 1.010. EKG daily. Mesna for hemorraghic cystitis ppx.   14. Day + 5 - start Zarxio,  MMF and Tacrolimus. Check Tacrolimus levels on Monday and Thursday.   15. Anxiolytics, antinausea, antidiarrhea medications as needed   16. Lasix PRN while being aggressively hydrated to avoid VOD   17. Nutritional support, pain management  Need for prophylactic measures:  1. VOD prophylaxis - low dose heparin gtt (dosed at 100 units / kg / day), glutamine supplementation, Actigall BID   2. PCP prophylaxis - Bactrim DS through day -2    3. Antiviral prophylaxis - Continue Acyclovir   4. Antifungal prophylaxis- Diflucan 400 mg po daily.  5.. GI prophylaxis - Protonix po QD   6. Antibacterial prophylaxis -  ANC < 500, started Cipro 500mg po BID. If becomes febrile, pan cx, CXR and change Cipro to Cefepime 2g IV q 8 hours. Continue until count recovery..slight rise in wbc noted after infusion of marrow  7. Aggressive mouth care and skin care as per protocol  8. GVHD prophylaxis- high dose CTX; MMF and Tacrolimus.  9. transfusion and electrolyte support    Patient with hemoglobin drop of ~2  on 6/4, likely due to chemotherapy however will repeat CBC tonight to establish stability, likely will require transfusion.  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  6/11 Febrile to 100.6F. Cipro switched to cefepime. Infectious work up ordered.  6/12 day 4 ctx today...given toci for haplo storm..some loose bowel, cdiff neg  6/13 mmf, tacro and zarxio.  6/16 overall doing well..on tacro 2 mg..repeat on monday..goal 5 to 10 1. Admit to BMTU Today is day + 10  2. TLC placement in IR   3. Day -6 - day + 5 - antiemetics   4. Day - 6 start CTX hydration - 1/2NS @ 200ml /hr    5. Strict I&O, daily weights, prn diuresis   6. Day -6 - day -2 - Fludarabine 30mg/m2 IV  7. Day -6 - day - 5 - CTX 14.5mg/kg/day IV. CTX to start after urine SG < 1.010. Mesna  12mg / kg - hemorrhagic cystitis ppx   8. Day -2 - IVIG 0.4 g/kg (ideal body weight) every 3 weeks. Premedication with Tylenol and benadryl  9. Day -1 -  cGy x 1 dose   10. Day - 1 - at 2200 begin transplant hydration : 0.45Ns + 1 amp (50mEq) sodium bicarbonate at 150ml /hr; continue transplant hydration for 24 hours post infusion   11. Day 0 – HPC transplant   12. Day + 2 at 2200 - begin CTX hydration (0.45NS + 10 mEq KCl/ @150ml /m2  continue 24 hours post last dose of CTX   13. Day + 3 - + 4 - CTX 50mg/kg/day IV. Begin CTX when urine SG < 1.010. EKG daily. Mesna for hemorraghic cystitis ppx.   14. Day + 5 - start Zarxio,  MMF and Tacrolimus. Check Tacrolimus levels on Monday and Thursday.   15. Anxiolytics, antinausea, antidiarrhea medications as needed   16. Lasix PRN while being aggressively hydrated to avoid VOD   17. Nutritional support, pain management  Need for prophylactic measures:  1. VOD prophylaxis - low dose heparin gtt (dosed at 100 units / kg / day), glutamine supplementation, Actigall BID   2. PCP prophylaxis - Bactrim DS through day -2    3. Antiviral prophylaxis - Continue Acyclovir   4. Antifungal prophylaxis- Diflucan 400 mg po daily.  5.. GI prophylaxis - Protonix po QD   6. Antibacterial prophylaxis -  ANC < 500, started Cipro 500mg po BID. If becomes febrile, pan cx, CXR and change Cipro to Cefepime 2g IV q 8 hours. Continue until count recovery..slight rise in wbc noted after infusion of marrow  7. Aggressive mouth care and skin care as per protocol  8. GVHD prophylaxis- high dose CTX; MMF and Tacrolimus.  9. transfusion and electrolyte support    Patient with hemoglobin drop of ~2  on 6/4, likely due to chemotherapy however will repeat CBC tonight to establish stability, likely will require transfusion.  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  6/11 Febrile to 100.6F. Cipro switched to cefepime. Infectious work up ordered.  6/12 day 4 ctx today...given toci for haplo storm..some loose bowel, cdiff neg  6/13 mmf, tacro and zarxio.  6/16 overall doing well..on tacro 2 mg..repeat on monday..goal 5 to 10

## 2023-06-18 NOTE — PROGRESS NOTE ADULT - SUBJECTIVE AND OBJECTIVE BOX
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 () with Flu / Cy / TBI prep regimen for treatment of severe aplastic anemia    S: Patient seen and examined with HPC Transplant Team:   + fatigue   All other ROS negative     O: Vitals:   Vital Signs Last 24 Hrs  T(C): 36.6 (2023 05:30), Max: 36.9 (2023 14:00)  T(F): 97.9 (2023 05:30), Max: 98.4 (2023 14:00)  HR: 82 (2023 05:30) (82 - 98)  BP: 131/80 (2023 05:30) (107/66 - 136/79)  BP(mean): --  RR: 20 (2023 05:30) (18 - 20)  SpO2: 99% (2023 05:30) (98% - 99%)    Parameters below as of 2023 05:30  Patient On (Oxygen Delivery Method): room air    Admit weight:   Daily     Daily Weight in k.7 (2023 10:20)    Intake / Output:   -17 @ 07:01  -  06-18 @ 07:00  --------------------------------------------------------  IN: 2804.1 mL / OUT: 3080 mL / NET: -275.9 mL      PE:   Oropharynx: Clear  Oral Mucositis: (-)                                                        Grade:   CVS: RRR, +S1,S2  Lungs: CTA B/L  Abdomen: Soft, NT, ND, +BS  Extremities: No edema in BLE  Gastric Mucositis: (-)                                                 Grade:   Intestinal Mucositis:   (-)                                           Grade:   Skin: Intact  TLC: CDI  Neuro: Alert, Ox3  Pain: Denies    Labs:       Cultures:         Radiology:       Meds:   Antimicrobials:   acyclovir   Oral Tab/Cap 400 milliGRAM(s) Oral every 8 hours  cefepime   IVPB 2000 milliGRAM(s) IV Intermittent every 8 hours  cefepime   IVPB      clotrimazole Lozenge 1 Lozenge Oral five times a day  fluconAZOLE   Tablet 400 milliGRAM(s) Oral daily      Heme / Onc:   heparin  Infusion 424 Unit(s)/Hr IV Continuous <Continuous>      GI:  famotidine    Tablet 20 milliGRAM(s) Oral two times a day  loperamide 2 milliGRAM(s) Oral every 4 hours PRN  senna 1 Tablet(s) Oral at bedtime PRN  sodium bicarbonate Mouth Rinse 10 milliLiter(s) Swish and Spit five times a day  ursodiol Capsule 300 milliGRAM(s) Oral two times a day    Immunologic:   filgrastim-sndz (ZARXIO) Injectable (Chemo) 480 MICROGram(s) SubCutaneous every 24 hours  mycophenolate mofetil (Chemo) 1000 milliGRAM(s) Oral three times a day  tacrolimus 2 milliGRAM(s) Oral <User Schedule>      Other medications:   acetaminophen     Tablet .. 650 milliGRAM(s) Oral every 6 hours  Biotene Dry Mouth Oral Rinse 5 milliLiter(s) Swish and Spit five times a day  Chemotherapy Worklist Order      chlorhexidine 4% Liquid 1 Application(s) Topical <User Schedule>  folic acid 1 milliGRAM(s) Oral daily  hydrocortisone 1% Cream 1 Application(s) Topical every 12 hours  Infuse HPC Product      lidocaine/prilocaine Cream (Chemo) 1 Application(s) Topical daily  multivitamin 1 Tablet(s) Oral daily  ondansetron Injectable 8 milliGRAM(s) IV Push every 8 hours  sodium bicarbonate  Infusion (Chemo) 0.074 mEq/kG/Hr IV Continuous <Continuous>  sodium chloride 0.9% 1000 milliLiter(s) IV Continuous <Continuous>  sodium chloride 0.9%. 1000 milliLiter(s) IV Continuous <Continuous>  sodium chloride 0.9%. (Chemo) 1000 milliLiter(s) IV Continuous <Continuous>    PRN:   acetaminophen     Tablet .. 650 milliGRAM(s) Oral every 6 hours PRN  diphenhydrAMINE 25 milliGRAM(s) Oral every 12 hours PRN  loperamide 2 milliGRAM(s) Oral every 4 hours PRN  prochlorperazine   Injectable 10 milliGRAM(s) IV Push every 6 hours PRN  senna 1 Tablet(s) Oral at bedtime PRN  sodium chloride 0.9% lock flush 10 milliLiter(s) IV Push every 1 hour PRN    A/P:     37 year old male with severe aplastic anemia with multiple lines of treatment admitted for a haplo-identical bone marrow transplant with  Flu / Cy / TBI preparative regimen   Day + 10  start CTX hydration - 1/2NS @ 200ml /hr    Strict I&O, daily weights, prn diuresis   Day -6 - day -2 - Fludarabine 30mg/m2 IV  Day -6 - day - 5 - CTX 14.5mg/kg/day IV. CTX to start after urine SG < 1.010. Mesna  12mg / kg - hemorrhagic cystitis ppx   Day -2 - IVIG 0.4 g/kg (ideal body weight) every 3 weeks. Premedication with Tylenol and benadryl   Day -1 -  cGy x 1 dose   Day - 1 - at 2200 begin transplant hydration : 0.45Ns + 1 amp (50mEq) sodium bicarbonate at 150ml /hr; continue transplant hydration for 24 hours post infusion    Day + 2 at 2200 - begin CTX hydration (0.45NS + 10 mEq KCl/ @150ml /m2  continue 24 hours post last dose of CTX   Day + 3 - + 4 - CTX 50mg/kg/day IV. Begin CTX when urine SG < 1.010. EKG daily. Mesna for hemorraghic cystitis ppx.   Day + 5 - start Zarxio,  MMF and Tacrolimus. Check Tacrolimus levels on Monday and Thursday.   when ANC < 500, start Cipro 500mg po BID. If becomes febrile, pan cx, CXR and change Cipro to Cefepime 2g IV q 8 hours. Continue until count recovery  - Fludarabine , intermittent nausea, continue antiemetics   - Fludarabine , IVIG today    TBI today  - HPC transplant today. Continue transplant hydration for 24 hours post infusion of bone marrow   - monitor for CRS / haplo storm - if T >/= 102.5 on days +1 or +2, would dose tocilizumab 8mg / kg IVPB x 1    C.Diff negative, started imodium  - post transplant CTX 2.2 - continue CTX hydration for 24 hours post infusion of last dose , Tosilizumab x1 dose O/N for temp of 39.1.   - Start tacrolimus / MMF today   6/15- persistent nausea, restart zofran ATC     1. Infectious Disease:   acyclovir   Oral Tab/Cap 400 milliGRAM(s) Oral every 8 hours  cefepime   IVPB 2000 milliGRAM(s) IV Intermittent every 8 hours  cefepime   IVPB      clotrimazole Lozenge 1 Lozenge Oral five times a day  fluconAZOLE   Tablet 400 milliGRAM(s) Oral daily  2. VOD Prophylaxis: Actigall, Glutamine, Heparin (dosed at 100 units / kg / day)     3. GI Prophylaxis:    famotidine    Tablet 20 milliGRAM(s) Oral two times a day    4. Mouthcare - NS / NaHCO3 rinses, Mycelex, Biotene; Skin care     5. GVHD prophylaxis   TBI day -1   CTX days +3, +4   mycophenolate mofetil (Chemo) 1000 milliGRAM(s) Oral three times a day  tacrolimus (Chemo) 2 milliGRAM(s) Oral two times a day    6. Transfuse & replete electrolytes prn   Anemia PRBC x 1 unit     7. IV hydration, daily weights, strict I&O, prn diuresis     8. PO intake as tolerated, nutrition follow up as needed, MVI, folic acid     9. Antiemetics, anti-diarrhea medications:   loperamide 2 milliGRAM(s) Oral every 4 hours PRN  ondansetron Injectable 8 milliGRAM(s) IV Push every 8 hours PRN  prochlorperazine   Injectable 10 milliGRAM(s) IV Push every 6 hours PRN    10. OOB as tolerated, physical therapy consult if needed     11. Monitor coags / fibrinogen 2x week, vitamin K as needed     12. Monitor closely for clinical changes, monitor for fevers     13. Emotional support provided, plan of care discussed and questions addressed     14. Patient education done regarding plan of care, restrictions and discharge planning     15. Continue regular social work input     I have written the above note for Dr. Wilder who performed service with me in the room.   Buck Sosa  NP-C ([phone])    I have seen and examined patient with NP, I agree with above note as scribed.                    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 () with Flu / Cy / TBI prep regimen for treatment of severe aplastic anemia    S: Patient seen and examined with HPC Transplant Team:   + fatigue   All other ROS negative     O: Vitals:   Vital Signs Last 24 Hrs  T(C): 36.6 (2023 05:30), Max: 36.9 (2023 14:00)  T(F): 97.9 (2023 05:30), Max: 98.4 (2023 14:00)  HR: 82 (2023 05:30) (82 - 98)  BP: 131/80 (2023 05:30) (107/66 - 136/79)  BP(mean): --  RR: 20 (2023 05:30) (18 - 20)  SpO2: 99% (2023 05:30) (98% - 99%)    Parameters below as of 2023 05:30  Patient On (Oxygen Delivery Method): room air    Admit weight: 102.8 kg  Daily Weight in k.0 kg    Intake / Output:   -17 @ 07:01  -  06-18 @ 07:00  --------------------------------------------------------  IN: 2804.1 mL / OUT: 3080 mL / NET: -275.9 mL      PE:   Oropharynx: Clear  Oral Mucositis: (-)                                                        Grade:   CVS: RRR, +S1,S2  Lungs: CTA B/L  Abdomen: Soft, NT, ND, +BS  Extremities: No edema in BLE  Gastric Mucositis: (-)                                                 Grade:   Intestinal Mucositis:   (-)                                           Grade:   Skin: Intact  TLC: CDI  Neuro: Alert, Ox3  Pain: Denies    Labs:                         7.6    0.01  )-----------( 7        ( 2023 06:52 )             21.5     Mean Cell Volume : 88.5 fl  Mean Cell Hemoglobin : 31.3 pg  Mean Cell Hemoglobin Concentration : 35.3 gm/dL  Auto Neutrophil # : x  Auto Lymphocyte # : x  Auto Monocyte # : x  Auto Eosinophil # : x  Auto Basophil # : x  Auto Neutrophil % : x  Auto Lymphocyte % : x  Auto Monocyte % : x  Auto Eosinophil % : x  Auto Basophil % : x    06-18    141  |  103  |  12  ----------------------------<  91  3.9   |  23  |  0.62    Ca    8.9      2023 06:52  Phos  3.0     06-18  Mg     1.8     06-18    TPro  6.6  /  Alb  4.3  /  TBili  0.6  /  DBili  x   /  AST  14  /  ALT  32  /  AlkPhos  75  06-18  Mg 1.8  Phos 3.0    Uric Acid --    Cultures:   Culture - Blood (06.10.23 @ 21:45)    Specimen Source: .Blood Blood-Catheter   Culture Results:   No Growth Final    Culture - Urine (06.10.23 @ 22:18)    Specimen Source: Clean Catch Clean Catch (Midstream)   Culture Results:   No growth    Radiology:   Xray Chest 1 View- PORTABLE-Urgent (Xray Chest 1 View- PORTABLE-Urgent .) (06.10.23 @ 22:38) >  Clear lungs.    Meds:   Antimicrobials:   acyclovir   Oral Tab/Cap 400 milliGRAM(s) Oral every 8 hours  cefepime   IVPB 2000 milliGRAM(s) IV Intermittent every 8 hours  cefepime   IVPB      clotrimazole Lozenge 1 Lozenge Oral five times a day  fluconAZOLE   Tablet 400 milliGRAM(s) Oral daily    Heme / Onc:   heparin  Infusion 424 Unit(s)/Hr IV Continuous <Continuous>    GI:  famotidine    Tablet 20 milliGRAM(s) Oral two times a day  loperamide 2 milliGRAM(s) Oral every 4 hours PRN  senna 1 Tablet(s) Oral at bedtime PRN  sodium bicarbonate Mouth Rinse 10 milliLiter(s) Swish and Spit five times a day  ursodiol Capsule 300 milliGRAM(s) Oral two times a day    Immunologic:   filgrastim-sndz (ZARXIO) Injectable (Chemo) 480 MICROGram(s) SubCutaneous every 24 hours  mycophenolate mofetil (Chemo) 1000 milliGRAM(s) Oral three times a day  tacrolimus 2 milliGRAM(s) Oral <User Schedule>    Other medications:   acetaminophen     Tablet .. 650 milliGRAM(s) Oral every 6 hours  Biotene Dry Mouth Oral Rinse 5 milliLiter(s) Swish and Spit five times a day  Chemotherapy Worklist Order      chlorhexidine 4% Liquid 1 Application(s) Topical <User Schedule>  folic acid 1 milliGRAM(s) Oral daily  hydrocortisone 1% Cream 1 Application(s) Topical every 12 hours  Infuse HPC Product      lidocaine/prilocaine Cream (Chemo) 1 Application(s) Topical daily  multivitamin 1 Tablet(s) Oral daily  ondansetron Injectable 8 milliGRAM(s) IV Push every 8 hours  sodium bicarbonate  Infusion (Chemo) 0.074 mEq/kG/Hr IV Continuous <Continuous>  sodium chloride 0.9% 1000 milliLiter(s) IV Continuous <Continuous>  sodium chloride 0.9%. 1000 milliLiter(s) IV Continuous <Continuous>  sodium chloride 0.9%. (Chemo) 1000 milliLiter(s) IV Continuous <Continuous>    PRN:   acetaminophen     Tablet .. 650 milliGRAM(s) Oral every 6 hours PRN  diphenhydrAMINE 25 milliGRAM(s) Oral every 12 hours PRN  loperamide 2 milliGRAM(s) Oral every 4 hours PRN  prochlorperazine   Injectable 10 milliGRAM(s) IV Push every 6 hours PRN  senna 1 Tablet(s) Oral at bedtime PRN  sodium chloride 0.9% lock flush 10 milliLiter(s) IV Push every 1 hour PRN    A/P: 37 year old male with severe aplastic anemia with multiple lines of treatment admitted for a haplo-identical bone marrow transplant with  Flu / Cy / TBI preparative regimen   Day + 10  start CTX hydration - 1/2NS @ 200ml /hr    Strict I&O, daily weights, prn diuresis   Day -6 - day -2 - Fludarabine 30mg/m2 IV  Day -6 - day - 5 - CTX 14.5mg/kg/day IV. CTX to start after urine SG < 1.010. Mesna  12mg / kg - hemorrhagic cystitis ppx   Day -2 - IVIG 0.4 g/kg (ideal body weight) every 3 weeks. Premedication with Tylenol and benadryl   Day -1 -  cGy x 1 dose   Day - 1 - at 2200 begin transplant hydration : 0.45Ns + 1 amp (50mEq) sodium bicarbonate at 150ml /hr; continue transplant hydration for 24 hours post infusion    Day + 2 at 2200 - begin CTX hydration (0.45NS + 10 mEq KCl/ @150ml /m2  continue 24 hours post last dose of CTX   Day + 3 - + 4 - CTX 50mg/kg/day IV. Begin CTX when urine SG < 1.010. EKG daily. Mesna for hemorraghic cystitis ppx.   Day + 5 - start Zarxio,  MMF and Tacrolimus. Check Tacrolimus levels on Monday and Thursday.   when ANC < 500, start Cipro 500mg po BID. If becomes febrile, pan cx, CXR and change Cipro to Cefepime 2g IV q 8 hours. Continue until count recovery  - Fludarabine , intermittent nausea, continue antiemetics   - Fludarabine , IVIG today    TBI today  - HPC transplant today. Continue transplant hydration for 24 hours post infusion of bone marrow   - monitor for CRS / haplo storm - if T >/= 102.5 on days +1 or +2, would dose tocilizumab 8mg / kg IVPB x 1    C.Diff negative, started imodium  - post transplant CTX 2.2 - continue CTX hydration for 24 hours post infusion of last dose , Tosilizumab x1 dose O/N for temp of 39.1.   - Start tacrolimus / MMF today   6/15- persistent nausea, restart zofran ATC     1. Infectious Disease:   acyclovir   Oral Tab/Cap 400 milliGRAM(s) Oral every 8 hours  cefepime   IVPB 2000 milliGRAM(s) IV Intermittent every 8 hours  clotrimazole Lozenge 1 Lozenge Oral five times a day  fluconAZOLE   Tablet 400 milliGRAM(s) Oral daily    2. VOD Prophylaxis: Actigall, Glutamine, Heparin (dosed at 100 units / kg / day)     3. GI Prophylaxis:    famotidine    Tablet 20 milliGRAM(s) Oral two times a day    4. Mouthcare - NS / NaHCO3 rinses, Mycelex, Biotene; Skin care     5. GVHD prophylaxis   TBI day -1   CTX days +3, +4   mycophenolate mofetil (Chemo) 1000 milliGRAM(s) Oral three times a day  tacrolimus (Chemo) 2 milliGRAM(s) Oral two times a day    6. Transfuse & replete electrolytes prn   Transfuse one unit Platelets.    7. IV hydration, daily weights, strict I&O, prn diuresis     8. PO intake as tolerated, nutrition follow up as needed, MVI, folic acid     9. Antiemetics, anti-diarrhea medications:   loperamide 2 milliGRAM(s) Oral every 4 hours PRN  ondansetron Injectable 8 milliGRAM(s) IV Push every 8 hours PRN  prochlorperazine   Injectable 10 milliGRAM(s) IV Push every 6 hours PRN    10. OOB as tolerated, physical therapy consult if needed     11. Monitor coags / fibrinogen 2x week, vitamin K as needed     12. Monitor closely for clinical changes, monitor for fevers     13. Emotional support provided, plan of care discussed and questions addressed     14. Patient education done regarding plan of care, restrictions and discharge planning     15. Continue regular social work input     I have written the above note for Dr. Ace who performed service with me in the room.   Buck Sosa  NP-C ([phone])    I have seen and examined patient with NP, I agree with above note as scribed.

## 2023-06-19 LAB
ALBUMIN SERPL ELPH-MCNC: 4.3 G/DL — SIGNIFICANT CHANGE UP (ref 3.3–5)
ALP SERPL-CCNC: 76 U/L — SIGNIFICANT CHANGE UP (ref 40–120)
ALT FLD-CCNC: 28 U/L — SIGNIFICANT CHANGE UP (ref 10–45)
ANION GAP SERPL CALC-SCNC: 15 MMOL/L — SIGNIFICANT CHANGE UP (ref 5–17)
APTT BLD: 30 SEC — SIGNIFICANT CHANGE UP (ref 27.5–35.5)
AST SERPL-CCNC: 13 U/L — SIGNIFICANT CHANGE UP (ref 10–40)
BILIRUB SERPL-MCNC: 0.6 MG/DL — SIGNIFICANT CHANGE UP (ref 0.2–1.2)
BLD GP AB SCN SERPL QL: NEGATIVE — SIGNIFICANT CHANGE UP
BUN SERPL-MCNC: 15 MG/DL — SIGNIFICANT CHANGE UP (ref 7–23)
CALCIUM SERPL-MCNC: 9 MG/DL — SIGNIFICANT CHANGE UP (ref 8.4–10.5)
CHLORIDE SERPL-SCNC: 104 MMOL/L — SIGNIFICANT CHANGE UP (ref 96–108)
CO2 SERPL-SCNC: 21 MMOL/L — LOW (ref 22–31)
CREAT SERPL-MCNC: 0.67 MG/DL — SIGNIFICANT CHANGE UP (ref 0.5–1.3)
EGFR: 125 ML/MIN/1.73M2 — SIGNIFICANT CHANGE UP
FIBRINOGEN PPP-MCNC: 205 MG/DL — SIGNIFICANT CHANGE UP (ref 200–445)
GLUCOSE SERPL-MCNC: 95 MG/DL — SIGNIFICANT CHANGE UP (ref 70–99)
HCT VFR BLD CALC: 21.3 % — LOW (ref 39–50)
HGB BLD-MCNC: 7.3 G/DL — LOW (ref 13–17)
INR BLD: 1.11 RATIO — SIGNIFICANT CHANGE UP (ref 0.88–1.16)
LDH SERPL L TO P-CCNC: 178 U/L — SIGNIFICANT CHANGE UP (ref 50–242)
MAGNESIUM SERPL-MCNC: 1.8 MG/DL — SIGNIFICANT CHANGE UP (ref 1.6–2.6)
MCHC RBC-ENTMCNC: 30.7 PG — SIGNIFICANT CHANGE UP (ref 27–34)
MCHC RBC-ENTMCNC: 34.3 GM/DL — SIGNIFICANT CHANGE UP (ref 32–36)
MCV RBC AUTO: 89.5 FL — SIGNIFICANT CHANGE UP (ref 80–100)
NRBC # BLD: 0 /100 WBCS — SIGNIFICANT CHANGE UP (ref 0–0)
PHOSPHATE SERPL-MCNC: 3.2 MG/DL — SIGNIFICANT CHANGE UP (ref 2.5–4.5)
PLATELET # BLD AUTO: 21 K/UL — LOW (ref 150–400)
POTASSIUM SERPL-MCNC: 4 MMOL/L — SIGNIFICANT CHANGE UP (ref 3.5–5.3)
POTASSIUM SERPL-SCNC: 4 MMOL/L — SIGNIFICANT CHANGE UP (ref 3.5–5.3)
PROT SERPL-MCNC: 6.5 G/DL — SIGNIFICANT CHANGE UP (ref 6–8.3)
PROTHROM AB SERPL-ACNC: 12.9 SEC — SIGNIFICANT CHANGE UP (ref 10.5–13.4)
RBC # BLD: 2.38 M/UL — LOW (ref 4.2–5.8)
RBC # FLD: 13.7 % — SIGNIFICANT CHANGE UP (ref 10.3–14.5)
RH IG SCN BLD-IMP: POSITIVE — SIGNIFICANT CHANGE UP
SODIUM SERPL-SCNC: 140 MMOL/L — SIGNIFICANT CHANGE UP (ref 135–145)
TACROLIMUS SERPL-MCNC: 1.4 NG/ML — SIGNIFICANT CHANGE UP
WBC # BLD: <0.3 K/UL — CRITICAL LOW (ref 3.8–10.5)
WBC # FLD AUTO: <0.3 K/UL — CRITICAL LOW (ref 3.8–10.5)

## 2023-06-19 PROCEDURE — 99233 SBSQ HOSP IP/OBS HIGH 50: CPT

## 2023-06-19 RX ORDER — MYCOPHENOLATE MOFETIL 250 MG/1
1000 CAPSULE ORAL THREE TIMES A DAY
Refills: 0 | Status: DISCONTINUED | OUTPATIENT
Start: 2023-06-19 | End: 2023-06-30

## 2023-06-19 RX ORDER — LORATADINE 10 MG/1
10 TABLET ORAL DAILY
Refills: 0 | Status: DISCONTINUED | OUTPATIENT
Start: 2023-06-19 | End: 2023-06-30

## 2023-06-19 RX ORDER — ALTEPLASE 100 MG
2 KIT INTRAVENOUS ONCE
Refills: 0 | Status: COMPLETED | OUTPATIENT
Start: 2023-06-19 | End: 2023-06-19

## 2023-06-19 RX ORDER — TACROLIMUS 5 MG/1
3 CAPSULE ORAL
Refills: 0 | Status: DISCONTINUED | OUTPATIENT
Start: 2023-06-19 | End: 2023-06-22

## 2023-06-19 RX ADMIN — Medication 1 LOZENGE: at 12:12

## 2023-06-19 RX ADMIN — ONDANSETRON 8 MILLIGRAM(S): 8 TABLET, FILM COATED ORAL at 15:07

## 2023-06-19 RX ADMIN — URSODIOL 300 MILLIGRAM(S): 250 TABLET, FILM COATED ORAL at 05:27

## 2023-06-19 RX ADMIN — Medication 480 MICROGRAM(S): at 16:02

## 2023-06-19 RX ADMIN — Medication 1 LOZENGE: at 08:05

## 2023-06-19 RX ADMIN — TACROLIMUS 3 MILLIGRAM(S): 5 CAPSULE ORAL at 19:42

## 2023-06-19 RX ADMIN — MYCOPHENOLATE MOFETIL 1000 MILLIGRAM(S): 250 CAPSULE ORAL at 15:06

## 2023-06-19 RX ADMIN — CEFEPIME 100 MILLIGRAM(S): 1 INJECTION, POWDER, FOR SOLUTION INTRAMUSCULAR; INTRAVENOUS at 22:03

## 2023-06-19 RX ADMIN — Medication 1 LOZENGE: at 19:43

## 2023-06-19 RX ADMIN — CEFEPIME 100 MILLIGRAM(S): 1 INJECTION, POWDER, FOR SOLUTION INTRAMUSCULAR; INTRAVENOUS at 15:05

## 2023-06-19 RX ADMIN — Medication 1 TABLET(S): at 12:14

## 2023-06-19 RX ADMIN — Medication 400 MILLIGRAM(S): at 15:08

## 2023-06-19 RX ADMIN — LORATADINE 10 MILLIGRAM(S): 10 TABLET ORAL at 15:05

## 2023-06-19 RX ADMIN — ONDANSETRON 8 MILLIGRAM(S): 8 TABLET, FILM COATED ORAL at 22:04

## 2023-06-19 RX ADMIN — FLUCONAZOLE 400 MILLIGRAM(S): 150 TABLET ORAL at 12:13

## 2023-06-19 RX ADMIN — Medication 5 MILLILITER(S): at 08:05

## 2023-06-19 RX ADMIN — Medication 400 MILLIGRAM(S): at 22:04

## 2023-06-19 RX ADMIN — Medication 1 MILLIGRAM(S): at 12:13

## 2023-06-19 RX ADMIN — Medication 10 MILLILITER(S): at 19:43

## 2023-06-19 RX ADMIN — FAMOTIDINE 20 MILLIGRAM(S): 10 INJECTION INTRAVENOUS at 05:26

## 2023-06-19 RX ADMIN — Medication 2 MILLIGRAM(S): at 19:42

## 2023-06-19 RX ADMIN — FAMOTIDINE 20 MILLIGRAM(S): 10 INJECTION INTRAVENOUS at 18:28

## 2023-06-19 RX ADMIN — MYCOPHENOLATE MOFETIL 1000 MILLIGRAM(S): 250 CAPSULE ORAL at 05:27

## 2023-06-19 RX ADMIN — Medication 10 MILLILITER(S): at 16:07

## 2023-06-19 RX ADMIN — Medication 1 LOZENGE: at 17:08

## 2023-06-19 RX ADMIN — Medication 400 MILLIGRAM(S): at 05:26

## 2023-06-19 RX ADMIN — Medication 5 MILLILITER(S): at 17:08

## 2023-06-19 RX ADMIN — Medication 5 MILLILITER(S): at 19:43

## 2023-06-19 RX ADMIN — Medication 5 MILLILITER(S): at 12:12

## 2023-06-19 RX ADMIN — Medication 10 MILLILITER(S): at 12:12

## 2023-06-19 RX ADMIN — CEFEPIME 100 MILLIGRAM(S): 1 INJECTION, POWDER, FOR SOLUTION INTRAMUSCULAR; INTRAVENOUS at 05:26

## 2023-06-19 RX ADMIN — Medication 1 APPLICATION(S): at 05:26

## 2023-06-19 RX ADMIN — ALTEPLASE 2 MILLIGRAM(S): KIT at 12:17

## 2023-06-19 RX ADMIN — CHLORHEXIDINE GLUCONATE 1 APPLICATION(S): 213 SOLUTION TOPICAL at 11:06

## 2023-06-19 RX ADMIN — MYCOPHENOLATE MOFETIL 1000 MILLIGRAM(S): 250 CAPSULE ORAL at 22:05

## 2023-06-19 RX ADMIN — URSODIOL 300 MILLIGRAM(S): 250 TABLET, FILM COATED ORAL at 18:27

## 2023-06-19 RX ADMIN — TACROLIMUS 2 MILLIGRAM(S): 5 CAPSULE ORAL at 05:28

## 2023-06-19 RX ADMIN — ONDANSETRON 8 MILLIGRAM(S): 8 TABLET, FILM COATED ORAL at 05:27

## 2023-06-19 RX ADMIN — Medication 1 APPLICATION(S): at 18:28

## 2023-06-19 RX ADMIN — Medication 10 MILLILITER(S): at 08:05

## 2023-06-19 NOTE — PROGRESS NOTE ADULT - SUBJECTIVE AND OBJECTIVE BOX
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 () with Flu / Cy / TBI prep regimen for treatment of severe aplastic anemia    S: Patient seen and examined with HPC Transplant Team:       O: Vitals:   Vital Signs Last 24 Hrs  T(C): 37.1 (2023 05:30), Max: 37.1 (2023 13:55)  T(F): 98.8 (2023 05:30), Max: 98.8 (2023 13:55)  HR: 90 (2023 05:30) (90 - 100)  BP: 118/69 (2023 05:30) (118/69 - 134/74)  BP(mean): --  RR: 19 (2023 05:30) (18 - 20)  SpO2: 99% (2023 05:30) (98% - 100%)    Parameters below as of 2023 05:30  Patient On (Oxygen Delivery Method): room air      Admit weight: 102.8kg   Daily Weight in k (2023 09:18)  Today's weight:     Intake / Output:   06-18 @ 07:01  -  06-19 @ 07:00  --------------------------------------------------------  IN: 2515.7 mL / OUT: 2525 mL / NET: -9.3 mL      PE:   Oropharynx: Clear  Oral Mucositis: (-)                                                        Grade:   CVS: RRR, +S1,S2  Lungs: CTA B/L  Abdomen: Soft, NT, ND, +BS  Extremities: No edema in BLE  Gastric Mucositis: (-)                                                 Grade:   Intestinal Mucositis:   (-)                                           Grade:   Skin: Intact  TLC: CDI  Neuro: Alert, Ox3  Pain: Denies    Labs:   CBC Full  -  ( 2023 07:11 )  WBC Count : <0.3 K/uL  Hemoglobin : 7.3 g/dL  Hematocrit : 21.3 %  Platelet Count - Automated : 21 K/uL  Mean Cell Volume : 89.5 fl  Mean Cell Hemoglobin : 30.7 pg  Mean Cell Hemoglobin Concentration : 34.3 gm/dL  Auto Neutrophil # : x  Auto Lymphocyte # : x  Auto Monocyte # : x  Auto Eosinophil # : x  Auto Basophil # : x  Auto Neutrophil % : x  Auto Lymphocyte % : x  Auto Monocyte % : x  Auto Eosinophil % : x  Auto Basophil % : x                          7.3    <0.3  )-----------( 21       ( 2023 07:11 )             21.3     PT/INR - ( 2023 07:12 )   PT: 12.9 sec;   INR: 1.11 ratio         PTT - ( 2023 07:12 )  PTT:30.0 sec  LIVER FUNCTIONS - ( 2023 06:52 )  Alb: 4.3 g/dL / Pro: 6.6 g/dL / ALK PHOS: 75 U/L / ALT: 32 U/L / AST: 14 U/L / GGT: x               Cultures:   Culture - Blood (06.10.23 @ 21:45)    Specimen Source: .Blood Blood-Catheter   Culture Results:   No Growth Final    Culture - Urine (06.10.23 @ 22:18)    Specimen Source: Clean Catch Clean Catch (Midstream)   Culture Results:   No growth    Radiology:   Xray Chest 1 View- PORTABLE-Urgent (Xray Chest 1 View- PORTABLE-Urgent .) (06.10.23 @ 22:38) >  Clear lungs.    Meds:   Antimicrobials:   acyclovir   Oral Tab/Cap 400 milliGRAM(s) Oral every 8 hours  cefepime   IVPB 2000 milliGRAM(s) IV Intermittent every 8 hours  cefepime   IVPB      clotrimazole Lozenge 1 Lozenge Oral five times a day  fluconAZOLE   Tablet 400 milliGRAM(s) Oral daily      Heme / Onc:   heparin  Infusion 424 Unit(s)/Hr IV Continuous <Continuous>      GI:  famotidine    Tablet 20 milliGRAM(s) Oral two times a day  loperamide 2 milliGRAM(s) Oral every 4 hours PRN  senna 1 Tablet(s) Oral at bedtime PRN  sodium bicarbonate Mouth Rinse 10 milliLiter(s) Swish and Spit five times a day  ursodiol Capsule 300 milliGRAM(s) Oral two times a day      Cardiovascular:       Immunologic:   filgrastim-sndz (ZARXIO) Injectable (Chemo) 480 MICROGram(s) SubCutaneous every 24 hours  mycophenolate mofetil (Chemo) 1000 milliGRAM(s) Oral three times a day  tacrolimus 2 milliGRAM(s) Oral <User Schedule>      Other medications:   acetaminophen     Tablet .. 650 milliGRAM(s) Oral every 6 hours  Biotene Dry Mouth Oral Rinse 5 milliLiter(s) Swish and Spit five times a day  Chemotherapy Worklist Order      chlorhexidine 4% Liquid 1 Application(s) Topical <User Schedule>  folic acid 1 milliGRAM(s) Oral daily  hydrocortisone 1% Cream 1 Application(s) Topical every 12 hours  Infuse HPC Product      lidocaine/prilocaine Cream (Chemo) 1 Application(s) Topical daily  multivitamin 1 Tablet(s) Oral daily  ondansetron Injectable 8 milliGRAM(s) IV Push every 8 hours  sodium bicarbonate  Infusion (Chemo) 0.074 mEq/kG/Hr IV Continuous <Continuous>  sodium chloride 0.9% 1000 milliLiter(s) IV Continuous <Continuous>  sodium chloride 0.9%. 1000 milliLiter(s) IV Continuous <Continuous>  sodium chloride 0.9%. (Chemo) 1000 milliLiter(s) IV Continuous <Continuous>      PRN:   acetaminophen     Tablet .. 650 milliGRAM(s) Oral every 6 hours PRN  diphenhydrAMINE 25 milliGRAM(s) Oral every 12 hours PRN  loperamide 2 milliGRAM(s) Oral every 4 hours PRN  prochlorperazine   Injectable 10 milliGRAM(s) IV Push every 6 hours PRN  senna 1 Tablet(s) Oral at bedtime PRN  sodium chloride 0.9% lock flush 10 milliLiter(s) IV Push every 1 hour PRN    A/P: 37 year old male with severe aplastic anemia with multiple lines of treatment admitted for a haplo-identical bone marrow transplant with  Flu / Cy / TBI preparative regimen   Day + 11  start CTX hydration - 1/2NS @ 200ml /hr    Strict I&O, daily weights, prn diuresis   Day -6 - day -2 - Fludarabine 30mg/m2 IV  Day -6 - day - 5 - CTX 14.5mg/kg/day IV. CTX to start after urine SG < 1.010. Mesna  12mg / kg - hemorrhagic cystitis ppx   Day -2 - IVIG 0.4 g/kg (ideal body weight) every 3 weeks. Premedication with Tylenol and benadryl   Day -1 -  cGy x 1 dose   Day - 1 - at  begin transplant hydration : 0.45Ns + 1 amp (50mEq) sodium bicarbonate at 150ml /hr; continue transplant hydration for 24 hours post infusion    Day + 2 at  - begin CTX hydration (0.45NS + 10 mEq KCl/ @150ml /m2  continue 24 hours post last dose of CTX   Day + 3 - + 4 - CTX 50mg/kg/day IV. Begin CTX when urine SG < 1.010. EKG daily. Mesna for hemorraghic cystitis ppx.   Day + 5 - start Zarxio,  MMF and Tacrolimus. Check Tacrolimus levels on Monday and Thursday.   when ANC < 500, start Cipro 500mg po BID. If becomes febrile, pan cx, CXR and change Cipro to Cefepime 2g IV q 8 hours. Continue until count recovery  - Fludarabine , intermittent nausea, continue antiemetics   - Fludarabine , IVIG today    TBI today  - HPC transplant today. Continue transplant hydration for 24 hours post infusion of bone marrow   - monitor for CRS / haplo storm - if T >/= 102.5 on days +1 or +2, would dose tocilizumab 8mg / kg IVPB x 1    C.Diff negative, started imodium  - post transplant CTX 2.2 - continue CTX hydration for 24 hours post infusion of last dose , Tosilizumab x1 dose O/N for temp of 39.1.   - Start tacrolimus / MMF today   6/15- persistent nausea, restart zofran ATC     1. Infectious Disease:   acyclovir   Oral Tab/Cap 400 milliGRAM(s) Oral every 8 hours  cefepime   IVPB 2000 milliGRAM(s) IV Intermittent every 8 hours  cefepime   IVPB      clotrimazole Lozenge 1 Lozenge Oral five times a day  fluconAZOLE   Tablet 400 milliGRAM(s) Oral daily    2. VOD Prophylaxis: Actigall, Glutamine, Heparin (dosed at 100 units / kg / day)     3. GI Prophylaxis:    loperamide 2 milliGRAM(s) Oral every 4 hours PRN    4. Mouthcare - NS / NaHCO3 rinses, Mycelex, Biotene; Skin care     5. GVHD prophylaxis   TBI day - 1   CTX days +3, +4   mycophenolate mofetil (Chemo) 1000 milliGRAM(s) Oral three times a day  tacrolimus 2 milliGRAM(s) Oral <User Schedule>    6. Transfuse & replete electrolytes prn     7. IV hydration, daily weights, strict I&O, prn diuresis     8. PO intake as tolerated, nutrition follow up as needed, MVI, folic acid     9. Antiemetics, anti-diarrhea medications:   loperamide 2 milliGRAM(s) Oral every 4 hours PRN  prochlorperazine   Injectable 10 milliGRAM(s) IV Push every 6 hours PRN  famotidine    Tablet 20 milliGRAM(s) Oral two times a day    10. OOB as tolerated, physical therapy consult if needed     11. Monitor coags / fibrinogen 2x week, vitamin K as needed     12. Monitor closely for clinical changes, monitor for fevers     13. Emotional support provided, plan of care discussed and questions addressed     14. Patient education done regarding plan of care, restrictions and discharge planning     15. Continue regular social work input     I have written the above note for Dr. Lima who performed service with me in the room.   Mila De Guzman NP-C ([phone])    I have seen and examined patient with NP, I agree with above note as scribed.                    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 () with Flu / Cy / TBI prep regimen for treatment of severe aplastic anemia    S: Patient seen and examined with HPC Transplant Team:   + fatigue     ROS otherwise negative     O: Vitals:   Vital Signs Last 24 Hrs  T(C): 37.1 (2023 05:30), Max: 37.1 (2023 13:55)  T(F): 98.8 (2023 05:30), Max: 98.8 (2023 13:55)  HR: 90 (2023 05:30) (90 - 100)  BP: 118/69 (2023 05:30) (118/69 - 134/74)  BP(mean): --  RR: 19 (2023 05:30) (18 - 20)  SpO2: 99% (2023 05:30) (98% - 100%)    Parameters below as of 2023 05:30  Patient On (Oxygen Delivery Method): room air    Admit weight: 102.8kg   Daily Weight in k (2023 09:18)  Today's weight: 96.4     Intake / Output:   18 @ 07:01  -  -19 @ 07:00  --------------------------------------------------------  IN: 2515.7 mL / OUT: 2525 mL / NET: -9.3 mL    PE:   Oropharynx: no erythema or ulcer s  Oral Mucositis: (-)                                                        Grade: -  CVS: RRR, +S1,S2  Lungs: CTA B/L  Abdomen: Soft, NT, ND, +BS  Extremities: No edema in BLE  Gastric Mucositis: (-)                                                 Grade: -  Intestinal Mucositis:   (-)                                           Grade: -  Skin: no rash   TLC: CDI  Neuro: Alert, O x 3  Pain: Denies    Labs:   CBC Full  -  ( 2023 07:11 )  WBC Count : <0.3 K/uL  Hemoglobin : 7.3 g/dL  Hematocrit : 21.3 %  Platelet Count - Automated : 21 K/uL  Mean Cell Volume : 89.5 fl  Mean Cell Hemoglobin : 30.7 pg  Mean Cell Hemoglobin Concentration : 34.3 gm/dL  Auto Neutrophil # : x  Auto Lymphocyte # : x  Auto Monocyte # : x  Auto Eosinophil # : x  Auto Basophil # : x  Auto Neutrophil % : x  Auto Lymphocyte % : x  Auto Monocyte % : x  Auto Eosinophil % : x  Auto Basophil % : x                          7.3    <0.3  )-----------( 21       ( 2023 07:11 )             21.3     PT/INR - ( 2023 07:12 )   PT: 12.9 sec;   INR: 1.11 ratio         PTT - ( 2023 07:12 )  PTT:30.0 sec  LIVER FUNCTIONS - ( 2023 06:52 )  Alb: 4.3 g/dL / Pro: 6.6 g/dL / ALK PHOS: 75 U/L / ALT: 32 U/L / AST: 14 U/L / GGT: x               Cultures:   Culture - Blood (06.10.23 @ 21:45)    Specimen Source: .Blood Blood-Catheter   Culture Results:   No Growth Final    Culture - Urine (06.10.23 @ 22:18)    Specimen Source: Clean Catch Clean Catch (Midstream)   Culture Results:   No growth    Radiology:   Xray Chest 1 View- PORTABLE-Urgent (Xray Chest 1 View- PORTABLE-Urgent .) (06.10.23 @ 22:38) >  Clear lungs.    Meds:   Antimicrobials:   acyclovir   Oral Tab/Cap 400 milliGRAM(s) Oral every 8 hours  cefepime   IVPB 2000 milliGRAM(s) IV Intermittent every 8 hours  cefepime   IVPB      clotrimazole Lozenge 1 Lozenge Oral five times a day  fluconAZOLE   Tablet 400 milliGRAM(s) Oral daily      Heme / Onc:   heparin  Infusion 424 Unit(s)/Hr IV Continuous <Continuous>      GI:  famotidine    Tablet 20 milliGRAM(s) Oral two times a day  loperamide 2 milliGRAM(s) Oral every 4 hours PRN  senna 1 Tablet(s) Oral at bedtime PRN  sodium bicarbonate Mouth Rinse 10 milliLiter(s) Swish and Spit five times a day  ursodiol Capsule 300 milliGRAM(s) Oral two times a day      Cardiovascular:       Immunologic:   filgrastim-sndz (ZARXIO) Injectable (Chemo) 480 MICROGram(s) SubCutaneous every 24 hours  mycophenolate mofetil (Chemo) 1000 milliGRAM(s) Oral three times a day  tacrolimus 2 milliGRAM(s) Oral <User Schedule>      Other medications:   acetaminophen     Tablet .. 650 milliGRAM(s) Oral every 6 hours  Biotene Dry Mouth Oral Rinse 5 milliLiter(s) Swish and Spit five times a day  Chemotherapy Worklist Order      chlorhexidine 4% Liquid 1 Application(s) Topical <User Schedule>  folic acid 1 milliGRAM(s) Oral daily  hydrocortisone 1% Cream 1 Application(s) Topical every 12 hours  Infuse HPC Product      lidocaine/prilocaine Cream (Chemo) 1 Application(s) Topical daily  multivitamin 1 Tablet(s) Oral daily  ondansetron Injectable 8 milliGRAM(s) IV Push every 8 hours  sodium bicarbonate  Infusion (Chemo) 0.074 mEq/kG/Hr IV Continuous <Continuous>  sodium chloride 0.9% 1000 milliLiter(s) IV Continuous <Continuous>  sodium chloride 0.9%. 1000 milliLiter(s) IV Continuous <Continuous>  sodium chloride 0.9%. (Chemo) 1000 milliLiter(s) IV Continuous <Continuous>      PRN:   acetaminophen     Tablet .. 650 milliGRAM(s) Oral every 6 hours PRN  diphenhydrAMINE 25 milliGRAM(s) Oral every 12 hours PRN  loperamide 2 milliGRAM(s) Oral every 4 hours PRN  prochlorperazine   Injectable 10 milliGRAM(s) IV Push every 6 hours PRN  senna 1 Tablet(s) Oral at bedtime PRN  sodium chloride 0.9% lock flush 10 milliLiter(s) IV Push every 1 hour PRN    A/P: 37 year old male with severe aplastic anemia with multiple lines of treatment admitted for a haplo-identical bone marrow transplant with  Flu / Cy / TBI preparative regimen   Day + 11  start CTX hydration - 1/2NS @ 200ml /hr    Strict I&O, daily weights, prn diuresis   Day -6 - day -2 - Fludarabine 30mg/m2 IV  Day -6 - day - 5 - CTX 14.5mg/kg/day IV. CTX to start after urine SG < 1.010. Mesna  12mg / kg - hemorrhagic cystitis ppx   Day -2 - IVIG 0.4 g/kg (ideal body weight) every 3 weeks. Premedication with Tylenol and benadryl   Day -1 -  cGy x 1 dose   Day - 1 - at 2200 begin transplant hydration : 0.45Ns + 1 amp (50mEq) sodium bicarbonate at 150ml /hr; continue transplant hydration for 24 hours post infusion    Day + 2 at 2200 - begin CTX hydration (0.45NS + 10 mEq KCl/ @150ml /m2  continue 24 hours post last dose of CTX   Day + 3 - + 4 - CTX 50mg/kg/day IV. Begin CTX when urine SG < 1.010. EKG daily. Mesna for hemorraghic cystitis ppx.   Day + 5 - start Zarxio,  MMF and Tacrolimus. Check Tacrolimus levels on Monday and Thursday.   when ANC < 500, start Cipro 500mg po BID. If becomes febrile, pan cx, CXR and change Cipro to Cefepime 2g IV q 8 hours. Continue until count recovery  - Fludarabine , intermittent nausea, continue antiemetics   - Fludarabine , IVIG today    TBI today  - HPC transplant today. Continue transplant hydration for 24 hours post infusion of bone marrow   - monitor for CRS / haplo storm - if T >/= 102.5 on days +1 or +2, would dose tocilizumab 8mg / kg IVPB x 1    C.Diff negative, started imodium  - post transplant CTX 2.2 - continue CTX hydration for 24 hours post infusion of last dose , Tosilizumab x1 dose O/N for temp of 39.1.   - Start tacrolimus / MMF today   6/15- persistent nausea, restart zofran ATC     1. Infectious Disease:   acyclovir   Oral Tab/Cap 400 milliGRAM(s) Oral every 8 hours  cefepime   IVPB 2000 milliGRAM(s) IV Intermittent every 8 hours  cefepime   IVPB      clotrimazole Lozenge 1 Lozenge Oral five times a day  fluconAZOLE   Tablet 400 milliGRAM(s) Oral daily    2. VOD Prophylaxis: Actigall, Glutamine, Heparin (dosed at 100 units / kg / day)     3. GI Prophylaxis:    loperamide 2 milliGRAM(s) Oral every 4 hours PRN    4. Mouthcare - NS / NaHCO3 rinses, Mycelex, Biotene; Skin care     5. GVHD prophylaxis   TBI day - 1   CTX days +3, +4   mycophenolate mofetil (Chemo) 1000 milliGRAM(s) Oral three times a day  tacrolimus 2 milliGRAM(s) Oral <User Schedule>    6. Transfuse & replete electrolytes prn     7. IV hydration, daily weights, strict I&O, prn diuresis     8. PO intake as tolerated, nutrition follow up as needed, MVI, folic acid     9. Antiemetics, anti-diarrhea medications:   loperamide 2 milliGRAM(s) Oral every 4 hours PRN  prochlorperazine   Injectable 10 milliGRAM(s) IV Push every 6 hours PRN  famotidine    Tablet 20 milliGRAM(s) Oral two times a day    10. OOB as tolerated, physical therapy consult if needed     11. Monitor coags / fibrinogen 2x week, vitamin K as needed     12. Monitor closely for clinical changes, monitor for fevers     13. Emotional support provided, plan of care discussed and questions addressed     14. Patient education done regarding plan of care, restrictions and discharge planning     15. Continue regular social work input     I have written the above note for Dr. Lima who performed service with me in the room.   Mila De Guzman NP-C ([phone])    I have seen and examined patient with NP, I agree with above note as scribed.

## 2023-06-19 NOTE — PHARMACOTHERAPY INTERVENTION NOTE - COMMENTS
37-year-old male with severe aplastic anemia previously treated with eATG +CsA + eltrombopag in April 2020. He relapsed November 2021 and was treated with rATG + CSA + prednisone + eltombopag and then ravulizumab. He is here for a haploidentical allogeneic stem cell transplant, today is day +11. Course has been complicated by intermittent nausea. Patient is on ondansetron 8 mg every 8 hours IV (due to end 6/20) and I encouraged him to request as needed nausea medication. Patient reports that his nausea is much improved and he is comfortable monitoring nausea with as needed ondansetron and not standing. Would recommend making ondansetron 8 mg PO q8h PRN, first line and prochlorperazine 10 mg PO q6h PRN, second line.    Most recent tacrolimus trough is 1.4 (drawn correctly, at steady state on 6/19) - recommend increasing dose to 3.5 mg in the morning and 3 mg in the evening (6 am and 6 pm) to reach goal of 5-10 and re-checking a level on Thursday, 6/22.    Asmita Carranza, PharmD, [facility]  Stem Cell Transplant Clinical Pharmacy Specialist

## 2023-06-19 NOTE — PROGRESS NOTE ADULT - NS ATTEND AMEND GEN_ALL_CORE FT
1. Admit to BMTU Today is day + 11  2. TLC placement in IR   3. Day -6 - day + 5 - antiemetics   4. Day - 6 start CTX hydration - 1/2NS @ 200ml /hr    5. Strict I&O, daily weights, prn diuresis   6. Day -6 - day -2 - Fludarabine 30mg/m2 IV  7. Day -6 - day - 5 - CTX 14.5mg/kg/day IV. CTX to start after urine SG < 1.010. Mesna  12mg / kg - hemorrhagic cystitis ppx   8. Day -2 - IVIG 0.4 g/kg (ideal body weight) every 3 weeks. Premedication with Tylenol and benadryl  9. Day -1 -  cGy x 1 dose   10. Day - 1 - at 2200 begin transplant hydration : 0.45Ns + 1 amp (50mEq) sodium bicarbonate at 150ml /hr; continue transplant hydration for 24 hours post infusion   11. Day 0 – HPC transplant   12. Day + 2 at 2200 - begin CTX hydration (0.45NS + 10 mEq KCl/ @150ml /m2  continue 24 hours post last dose of CTX   13. Day + 3 - + 4 - CTX 50mg/kg/day IV. Begin CTX when urine SG < 1.010. EKG daily. Mesna for hemorraghic cystitis ppx.   14. Day + 5 - start Zarxio,  MMF and Tacrolimus. Check Tacrolimus levels on Monday and Thursday.   15. Anxiolytics, antinausea, antidiarrhea medications as needed   16. Lasix PRN while being aggressively hydrated to avoid VOD   17. Nutritional support, pain management  Need for prophylactic measures:  1. VOD prophylaxis - low dose heparin gtt (dosed at 100 units / kg / day), glutamine supplementation, Actigall BID   2. PCP prophylaxis - Bactrim DS through day -2    3. Antiviral prophylaxis - Continue Acyclovir   4. Antifungal prophylaxis- Diflucan 400 mg po daily.  5.. GI prophylaxis - Protonix po QD   6. Antibacterial prophylaxis -  ANC < 500, started Cipro 500mg po BID. If becomes febrile, pan cx, CXR and change Cipro to Cefepime 2g IV q 8 hours. Continue until count recovery..slight rise in wbc noted after infusion of marrow  7. Aggressive mouth care and skin care as per protocol  8. GVHD prophylaxis- high dose CTX; MMF and Tacrolimus.  9. transfusion and electrolyte support    Patient with hemoglobin drop of ~2  on 6/4, likely due to chemotherapy however will repeat CBC tonight to establish stability, likely will require transfusion.  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  6/11 Febrile to 100.6F. Cipro switched to cefepime. Infectious work up ordered.  6/12 day 4 ctx today...given toci for haplo storm..some loose bowel, cdiff neg  6/13 mmf, tacro and zarxio.  6/16 overall doing well..on tacro 2 mg..repeat on monday..goal 5 to 10

## 2023-06-20 LAB
ALBUMIN SERPL ELPH-MCNC: 4.2 G/DL — SIGNIFICANT CHANGE UP (ref 3.3–5)
ALP SERPL-CCNC: 78 U/L — SIGNIFICANT CHANGE UP (ref 40–120)
ALT FLD-CCNC: 27 U/L — SIGNIFICANT CHANGE UP (ref 10–45)
ANION GAP SERPL CALC-SCNC: 15 MMOL/L — SIGNIFICANT CHANGE UP (ref 5–17)
AST SERPL-CCNC: 13 U/L — SIGNIFICANT CHANGE UP (ref 10–40)
BILIRUB SERPL-MCNC: 0.6 MG/DL — SIGNIFICANT CHANGE UP (ref 0.2–1.2)
BUN SERPL-MCNC: 16 MG/DL — SIGNIFICANT CHANGE UP (ref 7–23)
CALCIUM SERPL-MCNC: 8.9 MG/DL — SIGNIFICANT CHANGE UP (ref 8.4–10.5)
CHLORIDE SERPL-SCNC: 103 MMOL/L — SIGNIFICANT CHANGE UP (ref 96–108)
CO2 SERPL-SCNC: 22 MMOL/L — SIGNIFICANT CHANGE UP (ref 22–31)
CREAT SERPL-MCNC: 0.65 MG/DL — SIGNIFICANT CHANGE UP (ref 0.5–1.3)
EGFR: 126 ML/MIN/1.73M2 — SIGNIFICANT CHANGE UP
GLUCOSE SERPL-MCNC: 94 MG/DL — SIGNIFICANT CHANGE UP (ref 70–99)
HCT VFR BLD CALC: 20.1 % — CRITICAL LOW (ref 39–50)
HGB BLD-MCNC: 7.2 G/DL — LOW (ref 13–17)
LDH SERPL L TO P-CCNC: 137 U/L — SIGNIFICANT CHANGE UP (ref 50–242)
MAGNESIUM SERPL-MCNC: 1.6 MG/DL — SIGNIFICANT CHANGE UP (ref 1.6–2.6)
MCHC RBC-ENTMCNC: 31.7 PG — SIGNIFICANT CHANGE UP (ref 27–34)
MCHC RBC-ENTMCNC: 35.8 GM/DL — SIGNIFICANT CHANGE UP (ref 32–36)
MCV RBC AUTO: 88.5 FL — SIGNIFICANT CHANGE UP (ref 80–100)
NRBC # BLD: 0 /100 WBCS — SIGNIFICANT CHANGE UP (ref 0–0)
PHOSPHATE SERPL-MCNC: 3.8 MG/DL — SIGNIFICANT CHANGE UP (ref 2.5–4.5)
PLATELET # BLD AUTO: 8 K/UL — CRITICAL LOW (ref 150–400)
POTASSIUM SERPL-MCNC: 4.1 MMOL/L — SIGNIFICANT CHANGE UP (ref 3.5–5.3)
POTASSIUM SERPL-SCNC: 4.1 MMOL/L — SIGNIFICANT CHANGE UP (ref 3.5–5.3)
PROT SERPL-MCNC: 6.5 G/DL — SIGNIFICANT CHANGE UP (ref 6–8.3)
RBC # BLD: 2.27 M/UL — LOW (ref 4.2–5.8)
RBC # FLD: 13.3 % — SIGNIFICANT CHANGE UP (ref 10.3–14.5)
SODIUM SERPL-SCNC: 140 MMOL/L — SIGNIFICANT CHANGE UP (ref 135–145)
WBC # BLD: 0.01 K/UL — CRITICAL LOW (ref 3.8–10.5)
WBC # FLD AUTO: 0.01 K/UL — CRITICAL LOW (ref 3.8–10.5)

## 2023-06-20 PROCEDURE — 99233 SBSQ HOSP IP/OBS HIGH 50: CPT

## 2023-06-20 PROCEDURE — 86078 PHYS BLOOD BANK SERV REACTJ: CPT

## 2023-06-20 RX ORDER — HYDROCORTISONE 20 MG
50 TABLET ORAL EVERY 24 HOURS
Refills: 0 | Status: DISCONTINUED | OUTPATIENT
Start: 2023-06-20 | End: 2023-06-30

## 2023-06-20 RX ORDER — PIPERACILLIN AND TAZOBACTAM 4; .5 G/20ML; G/20ML
3.38 INJECTION, POWDER, LYOPHILIZED, FOR SOLUTION INTRAVENOUS ONCE
Refills: 0 | Status: COMPLETED | OUTPATIENT
Start: 2023-06-20 | End: 2023-06-20

## 2023-06-20 RX ORDER — HYDROCORTISONE 20 MG
50 TABLET ORAL ONCE
Refills: 0 | Status: COMPLETED | OUTPATIENT
Start: 2023-06-20 | End: 2023-06-20

## 2023-06-20 RX ORDER — PSEUDOEPHEDRINE HCL 30 MG
30 TABLET ORAL EVERY 6 HOURS
Refills: 0 | Status: DISCONTINUED | OUTPATIENT
Start: 2023-06-20 | End: 2023-06-30

## 2023-06-20 RX ORDER — PSEUDOEPHEDRINE HCL 30 MG
30 TABLET ORAL ONCE
Refills: 0 | Status: COMPLETED | OUTPATIENT
Start: 2023-06-20 | End: 2023-06-20

## 2023-06-20 RX ORDER — PIPERACILLIN AND TAZOBACTAM 4; .5 G/20ML; G/20ML
4.5 INJECTION, POWDER, LYOPHILIZED, FOR SOLUTION INTRAVENOUS EVERY 8 HOURS
Refills: 0 | Status: COMPLETED | OUTPATIENT
Start: 2023-06-20 | End: 2023-06-30

## 2023-06-20 RX ORDER — PIPERACILLIN AND TAZOBACTAM 4; .5 G/20ML; G/20ML
3.38 INJECTION, POWDER, LYOPHILIZED, FOR SOLUTION INTRAVENOUS ONCE
Refills: 0 | Status: DISCONTINUED | OUTPATIENT
Start: 2023-06-20 | End: 2023-06-20

## 2023-06-20 RX ADMIN — URSODIOL 300 MILLIGRAM(S): 250 TABLET, FILM COATED ORAL at 17:26

## 2023-06-20 RX ADMIN — Medication 1 APPLICATION(S): at 17:28

## 2023-06-20 RX ADMIN — Medication 1 MILLIGRAM(S): at 12:46

## 2023-06-20 RX ADMIN — Medication 5 MILLILITER(S): at 19:52

## 2023-06-20 RX ADMIN — Medication 480 MICROGRAM(S): at 13:12

## 2023-06-20 RX ADMIN — CHLORHEXIDINE GLUCONATE 1 APPLICATION(S): 213 SOLUTION TOPICAL at 05:56

## 2023-06-20 RX ADMIN — Medication 10 MILLILITER(S): at 23:52

## 2023-06-20 RX ADMIN — Medication 1 APPLICATION(S): at 05:57

## 2023-06-20 RX ADMIN — FAMOTIDINE 20 MILLIGRAM(S): 10 INJECTION INTRAVENOUS at 17:27

## 2023-06-20 RX ADMIN — Medication 1 LOZENGE: at 16:24

## 2023-06-20 RX ADMIN — Medication 5 MILLILITER(S): at 08:23

## 2023-06-20 RX ADMIN — MYCOPHENOLATE MOFETIL 1000 MILLIGRAM(S): 250 CAPSULE ORAL at 06:57

## 2023-06-20 RX ADMIN — Medication 30 MILLIGRAM(S): at 08:56

## 2023-06-20 RX ADMIN — FLUCONAZOLE 400 MILLIGRAM(S): 150 TABLET ORAL at 12:47

## 2023-06-20 RX ADMIN — Medication 30 MILLIGRAM(S): at 19:53

## 2023-06-20 RX ADMIN — Medication 400 MILLIGRAM(S): at 06:29

## 2023-06-20 RX ADMIN — Medication 5 MILLILITER(S): at 12:46

## 2023-06-20 RX ADMIN — Medication 1 LOZENGE: at 23:52

## 2023-06-20 RX ADMIN — Medication 1 TABLET(S): at 12:47

## 2023-06-20 RX ADMIN — MYCOPHENOLATE MOFETIL 1000 MILLIGRAM(S): 250 CAPSULE ORAL at 21:48

## 2023-06-20 RX ADMIN — Medication 10 MILLILITER(S): at 16:24

## 2023-06-20 RX ADMIN — Medication 5 MILLILITER(S): at 23:51

## 2023-06-20 RX ADMIN — Medication 1 LOZENGE: at 00:22

## 2023-06-20 RX ADMIN — Medication 10 MILLILITER(S): at 08:22

## 2023-06-20 RX ADMIN — FAMOTIDINE 20 MILLIGRAM(S): 10 INJECTION INTRAVENOUS at 06:57

## 2023-06-20 RX ADMIN — Medication 400 MILLIGRAM(S): at 21:48

## 2023-06-20 RX ADMIN — MYCOPHENOLATE MOFETIL 1000 MILLIGRAM(S): 250 CAPSULE ORAL at 13:11

## 2023-06-20 RX ADMIN — LORATADINE 10 MILLIGRAM(S): 10 TABLET ORAL at 12:47

## 2023-06-20 RX ADMIN — URSODIOL 300 MILLIGRAM(S): 250 TABLET, FILM COATED ORAL at 06:58

## 2023-06-20 RX ADMIN — PIPERACILLIN AND TAZOBACTAM 25 GRAM(S): 4; .5 INJECTION, POWDER, LYOPHILIZED, FOR SOLUTION INTRAVENOUS at 21:48

## 2023-06-20 RX ADMIN — Medication 1 LOZENGE: at 19:52

## 2023-06-20 RX ADMIN — Medication 400 MILLIGRAM(S): at 13:11

## 2023-06-20 RX ADMIN — ONDANSETRON 8 MILLIGRAM(S): 8 TABLET, FILM COATED ORAL at 05:57

## 2023-06-20 RX ADMIN — PIPERACILLIN AND TAZOBACTAM 200 GRAM(S): 4; .5 INJECTION, POWDER, LYOPHILIZED, FOR SOLUTION INTRAVENOUS at 13:17

## 2023-06-20 RX ADMIN — Medication 10 MILLILITER(S): at 00:22

## 2023-06-20 RX ADMIN — Medication 5 MILLILITER(S): at 00:23

## 2023-06-20 RX ADMIN — Medication 5 MILLILITER(S): at 16:24

## 2023-06-20 RX ADMIN — Medication 10 MILLILITER(S): at 19:52

## 2023-06-20 RX ADMIN — CEFEPIME 100 MILLIGRAM(S): 1 INJECTION, POWDER, FOR SOLUTION INTRAMUSCULAR; INTRAVENOUS at 05:56

## 2023-06-20 RX ADMIN — TACROLIMUS 3 MILLIGRAM(S): 5 CAPSULE ORAL at 08:22

## 2023-06-20 RX ADMIN — Medication 650 MILLIGRAM(S): at 09:46

## 2023-06-20 RX ADMIN — TACROLIMUS 3 MILLIGRAM(S): 5 CAPSULE ORAL at 19:51

## 2023-06-20 RX ADMIN — Medication 25 MILLIGRAM(S): at 09:46

## 2023-06-20 RX ADMIN — Medication 1 LOZENGE: at 12:46

## 2023-06-20 RX ADMIN — Medication 50 MILLIGRAM(S): at 10:45

## 2023-06-20 RX ADMIN — Medication 1 LOZENGE: at 08:22

## 2023-06-20 RX ADMIN — Medication 10 MILLILITER(S): at 12:46

## 2023-06-20 NOTE — PHARMACOTHERAPY INTERVENTION NOTE - COMMENTS
37-year-old male with severe aplastic anemia previously treated with eATG +CsA + eltrombopag in April 2020. He relapsed November 2021 and was treated with rATG + CSA + prednisone + eltombopag and then ravulizumab. He is here for a haploidentical allogeneic stem cell transplant, today is day +12. Course has been complicated by intermittent nausea, haplostorm, and now symptoms of pansinusitis.    Patient previously  37-year-old male with severe aplastic anemia previously treated with eATG +CsA + eltrombopag in April 2020. He relapsed November 2021 and was treated with rATG + CSA + prednisone + eltombopag and then ravulizumab. He is here for a haploidentical allogeneic stem cell transplant, today is day +12. Course has been complicated by intermittent nausea, haplostorm, and now symptoms of pansinusitis.    Patient previously had pansinusitis in January of 2022 that was treated with a 10-day course of ampicillin/sulbactam. Patient is experiencing similar symptoms   37-year-old male with severe aplastic anemia previously treated with eATG +CsA + eltrombopag in April 2020. He relapsed November 2021 and was treated with rATG + CSA + prednisone + eltombopag and then ravulizumab. He is here for a haploidentical allogeneic stem cell transplant, today is day +12. Course has been complicated by intermittent nausea, haplostorm, and now symptoms of pansinusitis.    Patient previously had pansinusitis in January of 2022 that was treated with a 10-day course of ampicillin/sulbactam. Patient is currently complaining of similar symptoms - recommended broadening coverage to piperacillin/tazobactam 4.5 g IV q8h for a 10-day course.    Asmita Carranza, PharmD, [facility]  Stem Cell Transplant Clinical Pharmacy Specialist

## 2023-06-20 NOTE — PROGRESS NOTE ADULT - NS ATTEND AMEND GEN_ALL_CORE FT
1. Admit to BMTU Today is day + 12  2. TLC placement in IR   3. Day -6 - day + 5 - antiemetics   4. Day - 6 start CTX hydration - 1/2NS @ 200ml /hr    5. Strict I&O, daily weights, prn diuresis   6. Day -6 - day -2 - Fludarabine 30mg/m2 IV  7. Day -6 - day - 5 - CTX 14.5mg/kg/day IV. CTX to start after urine SG < 1.010. [place]  12mg / kg - hemorrhagic cystitis ppx   8. Day -2 - IVIG 0.4 g/kg (ideal body weight) every 3 weeks. Premedication with Tylenol and benadryl  9. Day -1 -  cGy x 1 dose   10. Day - 1 - at 2200 begin transplant hydration : 0.45Ns + 1 amp (50mEq) sodium bicarbonate at 150ml /hr; continue transplant hydration for 24 hours post infusion   11. Day 0 – HPC transplant   12. Day + 2 at 2200 - begin CTX hydration (0.45NS + 10 mEq KCl/ @150ml /m2  continue 24 hours post last dose of CTX   13. Day + 3 - + 4 - CTX 50mg/kg/day IV. Begin CTX when urine SG < 1.010. EKG daily. [place] for hemorraghic cystitis ppx.   14. Day + 5 - start Zarxio,  MMF and Tacrolimus. Check Tacrolimus levels on Monday and Thursday.   15. Anxiolytics, antinausea, antidiarrhea medications as needed   16. Lasix PRN while being aggressively hydrated to avoid VOD   17. Nutritional support, pain management  Need for prophylactic measures:  1. VOD prophylaxis - low dose heparin gtt (dosed at 100 units / kg / day), glutamine supplementation, Actigall BID   2. PCP prophylaxis - Bactrim DS through day -2    3. Antiviral prophylaxis - Continue Acyclovir   4. Antifungal prophylaxis- Diflucan 400 mg po daily.  5.. GI prophylaxis - Protonix po QD   6. Antibacterial prophylaxis -  ANC < 500, started Cipro 500mg po BID. If becomes febrile, pan cx, CXR and change Cipro to Cefepime 2g IV q 8 hours. Continue until count recovery..slight rise in wbc noted after infusion of marrow  7. Aggressive mouth care and skin care as per protocol  8. GVHD prophylaxis- high dose CTX; MMF and Tacrolimus.  9. transfusion and electrolyte support    Patient with hemoglobin drop of ~2  on 6/4, likely due to chemotherapy however will repeat CBC tonight to establish stability, likely will require transfusion.  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  6/11 Febrile to 100.6F. Cipro switched to cefepime. Infectious work up ordered.  6/12 day 4 ctx today...given toci for haplo storm..some loose bowel, cdiff neg  6/13 mmf, tacro and zarxio.  6/16 overall doing well..on tacro 2 mg..repeat on monday..goal 5 to 10

## 2023-06-20 NOTE — PROGRESS NOTE ADULT - SUBJECTIVE AND OBJECTIVE BOX
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 () with Flu / Cy / TBI prep regimen for treatment of severe aplastic anemia    S: Patient seen and examined with HPC Transplant Team:   + sinus pain / pressure     O: Vitals:   Vital Signs Last 24 Hrs  T(C): 36.8 (2023 06:00), Max: 37.2 (2023 21:42)  T(F): 98.2 (2023 06:00), Max: 99 (2023 21:42)  HR: 102 (2023 06:00) (83 - 102)  BP: 121/72 (2023 06:00) (111/67 - 135/78)  BP(mean): --  RR: 18 (2023 06:00) (18 - 18)  SpO2: 99% (2023 06:00) (98% - 100%)    Parameters below as of 2023 21:42  Patient On (Oxygen Delivery Method): room air      Admit weight: 102.8kg   Daily Weight in k.4 (2023 09:10)  Today's weight:     Intake / Output:   - @ 07:01  -  06-20 @ 07:00  --------------------------------------------------------  IN: 1648 mL / OUT: 1450 mL / NET: 198 mL      PE:   Oropharynx: no erythema or ulcer s  Oral Mucositis: (-)                                                        Grade: -  CVS: RRR, +S1,S2  Lungs: CTA B/L  Abdomen: Soft, NT, ND, +BS  Extremities: No edema in BLE  Gastric Mucositis: (-)                                                 Grade: -  Intestinal Mucositis:   (-)                                           Grade: -  Skin: no rash   TLC: CDI  Neuro: Alert, O x 3  Pain: Denies    Labs:     Cultures:   Culture - Blood (06.10.23 @ 21:45)    Specimen Source: .Blood Blood-Catheter   Culture Results:   No Growth Final    Culture - Urine (06.10.23 @ 22:18)    Specimen Source: Clean Catch Clean Catch (Midstream)   Culture Results:   No growth    Radiology:   Xray Chest 1 View- PORTABLE-Urgent (Xray Chest 1 View- PORTABLE-Urgent .) (06.10.23 @ 22:38) >  Clear lungs.    Meds:   Antimicrobials:   acyclovir   Oral Tab/Cap 400 milliGRAM(s) Oral every 8 hours  cefepime   IVPB 2000 milliGRAM(s) IV Intermittent every 8 hours  cefepime   IVPB      clotrimazole Lozenge 1 Lozenge Oral five times a day  fluconAZOLE   Tablet 400 milliGRAM(s) Oral daily      Heme / Onc:   heparin  Infusion 424 Unit(s)/Hr IV Continuous <Continuous>      GI:  famotidine    Tablet 20 milliGRAM(s) Oral two times a day  loperamide 2 milliGRAM(s) Oral every 4 hours PRN  senna 1 Tablet(s) Oral at bedtime PRN  sodium bicarbonate Mouth Rinse 10 milliLiter(s) Swish and Spit five times a day  ursodiol Capsule 300 milliGRAM(s) Oral two times a day      Cardiovascular:       Immunologic:   filgrastim-sndz (ZARXIO) Injectable (Chemo) 480 MICROGram(s) SubCutaneous every 24 hours  mycophenolate mofetil 1000 milliGRAM(s) Oral three times a day  tacrolimus 3 milliGRAM(s) Oral <User Schedule>      Other medications:   acetaminophen     Tablet .. 650 milliGRAM(s) Oral every 6 hours  Biotene Dry Mouth Oral Rinse 5 milliLiter(s) Swish and Spit five times a day  Chemotherapy Worklist Order      chlorhexidine 4% Liquid 1 Application(s) Topical <User Schedule>  folic acid 1 milliGRAM(s) Oral daily  hydrocortisone 1% Cream 1 Application(s) Topical every 12 hours  Infuse HPC Product      lidocaine/prilocaine Cream (Chemo) 1 Application(s) Topical daily  loratadine 10 milliGRAM(s) Oral daily  multivitamin 1 Tablet(s) Oral daily  pseudoephedrine 30 milliGRAM(s) Oral once  sodium bicarbonate  Infusion (Chemo) 0.074 mEq/kG/Hr IV Continuous <Continuous>  sodium chloride 0.9% 1000 milliLiter(s) IV Continuous <Continuous>  sodium chloride 0.9%. 1000 milliLiter(s) IV Continuous <Continuous>  sodium chloride 0.9%. (Chemo) 1000 milliLiter(s) IV Continuous <Continuous>      PRN:   acetaminophen     Tablet .. 650 milliGRAM(s) Oral every 6 hours PRN  diphenhydrAMINE 25 milliGRAM(s) Oral every 12 hours PRN  loperamide 2 milliGRAM(s) Oral every 4 hours PRN  prochlorperazine   Injectable 10 milliGRAM(s) IV Push every 6 hours PRN  pseudoephedrine 30 milliGRAM(s) Oral every 6 hours PRN  senna 1 Tablet(s) Oral at bedtime PRN  sodium chloride 0.9% lock flush 10 milliLiter(s) IV Push every 1 hour PRN      A/P: 37 year old male with severe aplastic anemia with multiple lines of treatment admitted for a haplo-identical bone marrow transplant with  Flu / Cy / TBI preparative regimen   Day + 12  start CTX hydration - 1/2NS @ 200ml /hr    Strict I&O, daily weights, prn diuresis   Day -6 - day -2 - Fludarabine 30mg/m2 IV  Day -6 - day - 5 - CTX 14.5mg/kg/day IV. CTX to start after urine SG < 1.010. Mesna  12mg / kg - hemorrhagic cystitis ppx   Day -2 - IVIG 0.4 g/kg (ideal body weight) every 3 weeks. Premedication with Tylenol and benadryl   Day -1 -  cGy x 1 dose   Day - 1 - at 2200 begin transplant hydration : 0.45Ns + 1 amp (50mEq) sodium bicarbonate at 150ml /hr; continue transplant hydration for 24 hours post infusion    Day + 2 at 2200 - begin CTX hydration (0.45NS + 10 mEq KCl/ @150ml /m2  continue 24 hours post last dose of CTX   Day + 3 - + 4 - CTX 50mg/kg/day IV. Begin CTX when urine SG < 1.010. EKG daily. Mesna for hemorraghic cystitis ppx.   Day + 5 - start Zarxio,  MMF and Tacrolimus. Check Tacrolimus levels on Monday and Thursday.   when ANC < 500, start Cipro 500mg po BID. If becomes febrile, pan cx, CXR and change Cipro to Cefepime 2g IV q 8 hours. Continue until count recovery  - Fludarabine /, intermittent nausea, continue antiemetics   - Fludarabine , IVIG today    TBI today  - HPC transplant today. Continue transplant hydration for 24 hours post infusion of bone marrow   - monitor for CRS / haplo storm - if T >/= 102.5 on days +1 or +2, would dose tocilizumab 8mg / kg IVPB x 1    C.Diff negative, started imodium  - post transplant CTX 2.2 - continue CTX hydration for 24 hours post infusion of last dose , Tosilizumab x1 dose O/N for temp of 39.1.   - Start tacrolimus / MMF today   6/15- persistent nausea, restart zofran ATC     1. Infectious Disease:   acyclovir   Oral Tab/Cap 400 milliGRAM(s) Oral every 8 hours  cefepime   IVPB 2000 milliGRAM(s) IV Intermittent every 8 hours  cefepime   IVPB      clotrimazole Lozenge 1 Lozenge Oral five times a day  fluconAZOLE   Tablet 400 milliGRAM(s) Oral daily    2. VOD Prophylaxis: Actigall, Glutamine, Heparin (dosed at 100 units / kg / day)     3. GI Prophylaxis:   famotidine    Tablet 20 milliGRAM(s) Oral two times a day    4. Mouthcare - NS / NaHCO3 rinses, Mycelex, Biotene; Skin care     5. GVHD prophylaxis   TBI day -1   CTX days +3, +4   mycophenolate mofetil 1000 milliGRAM(s) Oral three times a day  tacrolimus 3 milliGRAM(s) Oral <User Schedule>    6. Transfuse & replete electrolytes prn     7. IV hydration, daily weights, strict I&O, prn diuresis     8. PO intake as tolerated, nutrition follow up as needed, MVI, folic acid     9. Antiemetics, anti-diarrhea medications:   loperamide 2 milliGRAM(s) Oral every 4 hours PRN  prochlorperazine   Injectable 10 milliGRAM(s) IV Push every 6 hours PRN    10. OOB as tolerated, physical therapy consult if needed     11. Monitor coags / fibrinogen 2x week, vitamin K as needed     12. Monitor closely for clinical changes, monitor for fevers     13. Emotional support provided, plan of care discussed and questions addressed     14. Patient education done regarding plan of care, restrictions and discharge planning     15. Continue regular social work input     I have written the above note for Dr. Lima who performed service with me in the room.   Mila DONGC ([phone])    I have seen and examined patient with NP, I agree with above note as scribed.                    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 () with Flu / Cy / TBI prep regimen for treatment of severe aplastic anemia    S: Patient seen and examined with HPC Transplant Team:   + sinus pain / pressure     O: Vitals:   Vital Signs Last 24 Hrs  T(C): 36.8 (2023 06:00), Max: 37.2 (2023 21:42)  T(F): 98.2 (2023 06:00), Max: 99 (2023 21:42)  HR: 102 (2023 06:00) (83 - 102)  BP: 121/72 (2023 06:00) (111/67 - 135/78)  BP(mean): --  RR: 18 (2023 06:00) (18 - 18)  SpO2: 99% (2023 06:00) (98% - 100%)    Parameters below as of 2023 21:42  Patient On (Oxygen Delivery Method): room air      Admit weight: 102.8kg   Daily Weight in k.4 (2023 09:10)  Today's weight:     Intake / Output:   06-19 @ 07:01  -  06-20 @ 07:00  --------------------------------------------------------  IN: 1648 mL / OUT: 1450 mL / NET: 198 mL      PE:   Oropharynx: no erythema or ulcer s  Oral Mucositis: (-)                                                        Grade: -  CVS: RRR, +S1,S2  Lungs: CTA B/L  Abdomen: Soft, NT, ND, +BS  Extremities: No edema in BLE  Gastric Mucositis: (-)                                                 Grade: -  Intestinal Mucositis:   (-)                                           Grade: -  Skin: no rash   TLC: CDI  Neuro: Alert, O x 3  Pain: Denies    Labs:                         7.2    0.01  )-----------( 8        ( 2023 07:27 )             20.1     06-20    140  |  103  |  16  ----------------------------<  94  4.1   |  22  |  0.65    Ca    8.9      2023 07:17  Phos  3.8       Mg     1.6         TPro  6.5  /  Alb  4.2  /  TBili  0.6  /  DBili  x   /  AST  13  /  ALT  27  /  AlkPhos  78        Cultures:   Culture - Blood (06.10.23 @ 21:45)    Specimen Source: .Blood Blood-Catheter   Culture Results:   No Growth Final    Culture - Urine (06.10.23 @ 22:18)    Specimen Source: Clean Catch Clean Catch (Midstream)   Culture Results:   No growth    Radiology:   Xray Chest 1 View- PORTABLE-Urgent (Xray Chest 1 View- PORTABLE-Urgent .) (06.10.23 @ 22:38) >  Clear lungs.    Meds:   Antimicrobials:   acyclovir   Oral Tab/Cap 400 milliGRAM(s) Oral every 8 hours  cefepime   IVPB 2000 milliGRAM(s) IV Intermittent every 8 hours  cefepime   IVPB      clotrimazole Lozenge 1 Lozenge Oral five times a day  fluconAZOLE   Tablet 400 milliGRAM(s) Oral daily      Heme / Onc:   heparin  Infusion 424 Unit(s)/Hr IV Continuous <Continuous>      GI:  famotidine    Tablet 20 milliGRAM(s) Oral two times a day  loperamide 2 milliGRAM(s) Oral every 4 hours PRN  senna 1 Tablet(s) Oral at bedtime PRN  sodium bicarbonate Mouth Rinse 10 milliLiter(s) Swish and Spit five times a day  ursodiol Capsule 300 milliGRAM(s) Oral two times a day      Cardiovascular:       Immunologic:   filgrastim-sndz (ZARXIO) Injectable (Chemo) 480 MICROGram(s) SubCutaneous every 24 hours  mycophenolate mofetil 1000 milliGRAM(s) Oral three times a day  tacrolimus 3 milliGRAM(s) Oral <User Schedule>      Other medications:   acetaminophen     Tablet .. 650 milliGRAM(s) Oral every 6 hours  Biotene Dry Mouth Oral Rinse 5 milliLiter(s) Swish and Spit five times a day  Chemotherapy Worklist Order      chlorhexidine 4% Liquid 1 Application(s) Topical <User Schedule>  folic acid 1 milliGRAM(s) Oral daily  hydrocortisone 1% Cream 1 Application(s) Topical every 12 hours  Infuse HPC Product      lidocaine/prilocaine Cream (Chemo) 1 Application(s) Topical daily  loratadine 10 milliGRAM(s) Oral daily  multivitamin 1 Tablet(s) Oral daily  pseudoephedrine 30 milliGRAM(s) Oral once  sodium bicarbonate  Infusion (Chemo) 0.074 mEq/kG/Hr IV Continuous <Continuous>  sodium chloride 0.9% 1000 milliLiter(s) IV Continuous <Continuous>  sodium chloride 0.9%. 1000 milliLiter(s) IV Continuous <Continuous>  sodium chloride 0.9%. (Chemo) 1000 milliLiter(s) IV Continuous <Continuous>      PRN:   acetaminophen     Tablet .. 650 milliGRAM(s) Oral every 6 hours PRN  diphenhydrAMINE 25 milliGRAM(s) Oral every 12 hours PRN  loperamide 2 milliGRAM(s) Oral every 4 hours PRN  prochlorperazine   Injectable 10 milliGRAM(s) IV Push every 6 hours PRN  pseudoephedrine 30 milliGRAM(s) Oral every 6 hours PRN  senna 1 Tablet(s) Oral at bedtime PRN  sodium chloride 0.9% lock flush 10 milliLiter(s) IV Push every 1 hour PRN      A/P: 37 year old male with severe aplastic anemia with multiple lines of treatment admitted for a haplo-identical bone marrow transplant with  Flu / Cy / TBI preparative regimen   Day + 12  start CTX hydration - 1/2NS @ 200ml /hr    Strict I&O, daily weights, prn diuresis   Day -6 - day -2 - Fludarabine 30mg/m2 IV  Day -6 - day - 5 - CTX 14.5mg/kg/day IV. CTX to start after urine SG < 1.010. Mesna  12mg / kg - hemorrhagic cystitis ppx   Day -2 - IVIG 0.4 g/kg (ideal body weight) every 3 weeks. Premedication with Tylenol and benadryl   Day -1 -  cGy x 1 dose   Day - 1 - at 2200 begin transplant hydration : 0.45Ns + 1 amp (50mEq) sodium bicarbonate at 150ml /hr; continue transplant hydration for 24 hours post infusion    Day + 2 at 2200 - begin CTX hydration (0.45NS + 10 mEq KCl/ @150ml /m2  continue 24 hours post last dose of CTX   Day + 3 - + 4 - CTX 50mg/kg/day IV. Begin CTX when urine SG < 1.010. EKG daily. Mesna for hemorraghic cystitis ppx.   Day + 5 - start Zarxio,  MMF and Tacrolimus. Check Tacrolimus levels on Monday and Thursday.   when ANC < 500, start Cipro 500mg po BID. If becomes febrile, pan cx, CXR and change Cipro to Cefepime 2g IV q 8 hours. Continue until count recovery  - Fludarabine , intermittent nausea, continue antiemetics   - Fludarabine , IVIG today    TBI today  - HPC transplant today. Continue transplant hydration for 24 hours post infusion of bone marrow   - monitor for CRS / haplo storm - if T >/= 102.5 on days +1 or +2, would dose tocilizumab 8mg / kg IVPB x 1    C.Diff negative, started imodium  - post transplant CTX 2.2 - continue CTX hydration for 24 hours post infusion of last dose , Tosilizumab x1 dose O/N for temp of 39.1.   - Start tacrolimus / MMF today   6/15- persistent nausea, restart zofran ATC     1. Infectious Disease:   acyclovir   Oral Tab/Cap 400 milliGRAM(s) Oral every 8 hours  cefepime   IVPB 2000 milliGRAM(s) IV Intermittent every 8 hours  cefepime   IVPB      clotrimazole Lozenge 1 Lozenge Oral five times a day  fluconAZOLE   Tablet 400 milliGRAM(s) Oral daily    2. VOD Prophylaxis: Actigall, Glutamine, Heparin (dosed at 100 units / kg / day)     3. GI Prophylaxis:   famotidine    Tablet 20 milliGRAM(s) Oral two times a day    4. Mouthcare - NS / NaHCO3 rinses, Mycelex, Biotene; Skin care     5. GVHD prophylaxis   TBI day -1   CTX days +3, +4   mycophenolate mofetil 1000 milliGRAM(s) Oral three times a day  tacrolimus 3 milliGRAM(s) Oral <User Schedule>    6. Transfuse & replete electrolytes prn   1 bag platelets    7. IV hydration, daily weights, strict I&O, prn diuresis     8. PO intake as tolerated, nutrition follow up as needed, MVI, folic acid     9. Antiemetics, anti-diarrhea medications:   loperamide 2 milliGRAM(s) Oral every 4 hours PRN  prochlorperazine   Injectable 10 milliGRAM(s) IV Push every 6 hours PRN    10. OOB as tolerated, physical therapy consult if needed     11. Monitor coags / fibrinogen 2x week, vitamin K as needed     12. Monitor closely for clinical changes, monitor for fevers     13. Emotional support provided, plan of care discussed and questions addressed     14. Patient education done regarding plan of care, restrictions and discharge planning     15. Continue regular social work input     I have written the above note for Dr. Lima who performed service with me in the room.   Mila De Guzman NP-C ([phone])    I have seen and examined patient with NP, I agree with above note as scribed.                    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 () with Flu / Cy / TBI prep regimen for treatment of severe aplastic anemia    S: Patient seen and examined with HPC Transplant Team:   + sinus pain / pressure     O: Vitals:   Vital Signs Last 24 Hrs  T(C): 36.8 (2023 06:00), Max: 37.2 (2023 21:42)  T(F): 98.2 (2023 06:00), Max: 99 (2023 21:42)  HR: 102 (2023 06:00) (83 - 102)  BP: 121/72 (2023 06:00) (111/67 - 135/78)  BP(mean): --  RR: 18 (2023 06:00) (18 - 18)  SpO2: 99% (2023 06:00) (98% - 100%)    Parameters below as of 2023 21:42  Patient On (Oxygen Delivery Method): room air      Admit weight: 102.8kg   Daily Weight in k.4 (2023 09:10)  Today's weight: 95.5kg     Intake / Output:   -19 @ 07:01  -  06-20 @ 07:00  --------------------------------------------------------  IN: 1648 mL / OUT: 1450 mL / NET: 198 mL      PE:   Oropharynx: no erythema or ulcer s  Oral Mucositis: (-)                                                        Grade: -  CVS: RRR, +S1,S2  Lungs: CTA B/L  Abdomen: Soft, NT, ND, +BS  Extremities: No edema in BLE  Gastric Mucositis: (-)                                                 Grade: -  Intestinal Mucositis:   (-)                                           Grade: -  Skin: no rash   TLC: CDI  Neuro: Alert, O x 3  Pain: Denies    Labs:                         7.2    0.01  )-----------( 8        ( 2023 07:27 )             20.1     06-20    140  |  103  |  16  ----------------------------<  94  4.1   |  22  |  0.65    Ca    8.9      2023 07:17  Phos  3.8       Mg     1.6         TPro  6.5  /  Alb  4.2  /  TBili  0.6  /  DBili  x   /  AST  13  /  ALT  27  /  AlkPhos  78        Cultures:   Culture - Blood (06.10.23 @ 21:45)    Specimen Source: .Blood Blood-Catheter   Culture Results:   No Growth Final    Culture - Urine (06.10.23 @ 22:18)    Specimen Source: Clean Catch Clean Catch (Midstream)   Culture Results:   No growth    Radiology:   Xray Chest 1 View- PORTABLE-Urgent (Xray Chest 1 View- PORTABLE-Urgent .) (06.10.23 @ 22:38) >  Clear lungs.    Meds:   Antimicrobials:   acyclovir   Oral Tab/Cap 400 milliGRAM(s) Oral every 8 hours  cefepime   IVPB 2000 milliGRAM(s) IV Intermittent every 8 hours  cefepime   IVPB      clotrimazole Lozenge 1 Lozenge Oral five times a day  fluconAZOLE   Tablet 400 milliGRAM(s) Oral daily      Heme / Onc:   heparin  Infusion 424 Unit(s)/Hr IV Continuous <Continuous>      GI:  famotidine    Tablet 20 milliGRAM(s) Oral two times a day  loperamide 2 milliGRAM(s) Oral every 4 hours PRN  senna 1 Tablet(s) Oral at bedtime PRN  sodium bicarbonate Mouth Rinse 10 milliLiter(s) Swish and Spit five times a day  ursodiol Capsule 300 milliGRAM(s) Oral two times a day      Cardiovascular:       Immunologic:   filgrastim-sndz (ZARXIO) Injectable (Chemo) 480 MICROGram(s) SubCutaneous every 24 hours  mycophenolate mofetil 1000 milliGRAM(s) Oral three times a day  tacrolimus 3 milliGRAM(s) Oral <User Schedule>      Other medications:   acetaminophen     Tablet .. 650 milliGRAM(s) Oral every 6 hours  Biotene Dry Mouth Oral Rinse 5 milliLiter(s) Swish and Spit five times a day  Chemotherapy Worklist Order      chlorhexidine 4% Liquid 1 Application(s) Topical <User Schedule>  folic acid 1 milliGRAM(s) Oral daily  hydrocortisone 1% Cream 1 Application(s) Topical every 12 hours  Infuse HPC Product      lidocaine/prilocaine Cream (Chemo) 1 Application(s) Topical daily  loratadine 10 milliGRAM(s) Oral daily  multivitamin 1 Tablet(s) Oral daily  pseudoephedrine 30 milliGRAM(s) Oral once  sodium bicarbonate  Infusion (Chemo) 0.074 mEq/kG/Hr IV Continuous <Continuous>  sodium chloride 0.9% 1000 milliLiter(s) IV Continuous <Continuous>  sodium chloride 0.9%. 1000 milliLiter(s) IV Continuous <Continuous>  sodium chloride 0.9%. (Chemo) 1000 milliLiter(s) IV Continuous <Continuous>      PRN:   acetaminophen     Tablet .. 650 milliGRAM(s) Oral every 6 hours PRN  diphenhydrAMINE 25 milliGRAM(s) Oral every 12 hours PRN  loperamide 2 milliGRAM(s) Oral every 4 hours PRN  prochlorperazine   Injectable 10 milliGRAM(s) IV Push every 6 hours PRN  pseudoephedrine 30 milliGRAM(s) Oral every 6 hours PRN  senna 1 Tablet(s) Oral at bedtime PRN  sodium chloride 0.9% lock flush 10 milliLiter(s) IV Push every 1 hour PRN      A/P: 37 year old male with severe aplastic anemia with multiple lines of treatment admitted for a haplo-identical bone marrow transplant with  Flu / Cy / TBI preparative regimen   Day + 12  start CTX hydration - 1/2NS @ 200ml /hr    Strict I&O, daily weights, prn diuresis   Day -6 - day -2 - Fludarabine 30mg/m2 IV  Day -6 - day - 5 - CTX 14.5mg/kg/day IV. CTX to start after urine SG < 1.010. Mesna  12mg / kg - hemorrhagic cystitis ppx   Day -2 - IVIG 0.4 g/kg (ideal body weight) every 3 weeks. Premedication with Tylenol and benadryl   Day -1 -  cGy x 1 dose   Day - 1 - at 2200 begin transplant hydration : 0.45Ns + 1 amp (50mEq) sodium bicarbonate at 150ml /hr; continue transplant hydration for 24 hours post infusion    Day + 2 at 2200 - begin CTX hydration (0.45NS + 10 mEq KCl/ @150ml /m2  continue 24 hours post last dose of CTX   Day + 3 - + 4 - CTX 50mg/kg/day IV. Begin CTX when urine SG < 1.010. EKG daily. Mesna for hemorraghic cystitis ppx.   Day + 5 - start Zarxio,  MMF and Tacrolimus. Check Tacrolimus levels on Monday and Thursday.   when ANC < 500, start Cipro 500mg po BID. If becomes febrile, pan cx, CXR and change Cipro to Cefepime 2g IV q 8 hours. Continue until count recovery  - Fludarabine , intermittent nausea, continue antiemetics   - Fludarabine , IVIG today    TBI today  - HPC transplant today. Continue transplant hydration for 24 hours post infusion of bone marrow   - monitor for CRS / haplo storm - if T >/= 102.5 on days +1 or +2, would dose tocilizumab 8mg / kg IVPB x 1    C.Diff negative, started imodium  - post transplant CTX 2.2 - continue CTX hydration for 24 hours post infusion of last dose , Tosilizumab x1 dose O/N for temp of 39.1.   - Start tacrolimus / MMF today   6/15- persistent nausea, restart zofran ATC     1. Infectious Disease:   acyclovir   Oral Tab/Cap 400 milliGRAM(s) Oral every 8 hours  cefepime   IVPB 2000 milliGRAM(s) IV Intermittent every 8 hours  cefepime   IVPB      clotrimazole Lozenge 1 Lozenge Oral five times a day  fluconAZOLE   Tablet 400 milliGRAM(s) Oral daily    2. VOD Prophylaxis: Actigall, Glutamine, Heparin (dosed at 100 units / kg / day)     3. GI Prophylaxis:   famotidine    Tablet 20 milliGRAM(s) Oral two times a day    4. Mouthcare - NS / NaHCO3 rinses, Mycelex, Biotene; Skin care     5. GVHD prophylaxis   TBI day -1   CTX days +3, +4   mycophenolate mofetil 1000 milliGRAM(s) Oral three times a day  tacrolimus 3 milliGRAM(s) Oral <User Schedule>    6. Transfuse & replete electrolytes prn   1 bag platelets    7. IV hydration, daily weights, strict I&O, prn diuresis     8. PO intake as tolerated, nutrition follow up as needed, MVI, folic acid     9. Antiemetics, anti-diarrhea medications:   loperamide 2 milliGRAM(s) Oral every 4 hours PRN  prochlorperazine   Injectable 10 milliGRAM(s) IV Push every 6 hours PRN    10. OOB as tolerated, physical therapy consult if needed     11. Monitor coags / fibrinogen 2x week, vitamin K as needed     12. Monitor closely for clinical changes, monitor for fevers     13. Emotional support provided, plan of care discussed and questions addressed     14. Patient education done regarding plan of care, restrictions and discharge planning     15. Continue regular social work input     I have written the above note for Dr. Lima who performed service with me in the room.   Mila De Guzman NP-C ([phone])    I have seen and examined patient with NP, I agree with above note as scribed.                    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     O: Vitals:   Vital Signs Last 24 Hrs  T(C): 36.8 (2023 06:00), Max: 37.2 (2023 21:42)  T(F): 98.2 (2023 06:00), Max: 99 (2023 21:42)  HR: 102 (2023 06:00) (83 - 102)  BP: 121/72 (2023 06:00) (111/67 - 135/78)  BP(mean): --  RR: 18 (2023 06:00) (18 - 18)  SpO2: 99% (2023 06:00) (98% - 100%)    Parameters below as of 2023 21:42  Patient On (Oxygen Delivery Method): room air      Admit weight: 102.8kg   Daily Weight in k.4 (2023 09:10)  Today's weight: 95.5kg     Intake / Output:   -19 @ 07:01  -  06-20 @ 07:00  --------------------------------------------------------  IN: 1648 mL / OUT: 1450 mL / NET: 198 mL      PE:   Oropharynx: no erythema or ulcers  Oral Mucositis: (-)                                                        Grade: -  CVS: RRR, +S1,S2  Lungs: CTA B/L  Abdomen: Soft, NT, ND, +BS  Extremities: No edema in BLE  Gastric Mucositis: (-)                                                 Grade: -  Intestinal Mucositis:   (-)                                           Grade: -  Skin: no rash other than the spots from platelet transfusion.   TLC: CDI  Neuro: Alert, O x 3  Pain: Denies    Labs:                         7.2    0.01  )-----------( 8        ( 2023 07:27 )             20.1     06-20    140  |  103  |  16  ----------------------------<  94  4.1   |  22  |  0.65    Ca    8.9      2023 07:17  Phos  3.8       Mg     1.6       TPro  6.5  /  Alb  4.2  /  TBili  0.6  /  DBili  x   /  AST  13  /  ALT  27  /  AlkPhos  78        Cultures:   Culture - Blood (06.10.23 @ 21:45)    Specimen Source: .Blood Blood-Catheter   Culture Results:   No Growth Final    Culture - Urine (06.10.23 @ 22:18)    Specimen Source: Clean Catch Clean Catch (Midstream)   Culture Results:   No growth    Radiology:   Xray Chest 1 View- PORTABLE-Urgent (Xray Chest 1 View- PORTABLE-Urgent .) (06.10.23 @ 22:38) >  Clear lungs.    Meds:   Antimicrobials:   acyclovir   Oral Tab/Cap 400 milliGRAM(s) Oral every 8 hours  cefepime   IVPB 2000 milliGRAM(s) IV Intermittent every 8 hours  cefepime   IVPB      clotrimazole Lozenge 1 Lozenge Oral five times a day  fluconAZOLE   Tablet 400 milliGRAM(s) Oral daily      Heme / Onc:   heparin  Infusion 424 Unit(s)/Hr IV Continuous <Continuous>      GI:  famotidine    Tablet 20 milliGRAM(s) Oral two times a day  loperamide 2 milliGRAM(s) Oral every 4 hours PRN  senna 1 Tablet(s) Oral at bedtime PRN  sodium bicarbonate Mouth Rinse 10 milliLiter(s) Swish and Spit five times a day  ursodiol Capsule 300 milliGRAM(s) Oral two times a day      Immunologic:   filgrastim-sndz (ZARXIO) Injectable (Chemo) 480 MICROGram(s) SubCutaneous every 24 hours  mycophenolate mofetil 1000 milliGRAM(s) Oral three times a day  tacrolimus 3 milliGRAM(s) Oral <User Schedule>      Other medications:   acetaminophen     Tablet .. 650 milliGRAM(s) Oral every 6 hours  Biotene Dry Mouth Oral Rinse 5 milliLiter(s) Swish and Spit five times a day  Chemotherapy Worklist Order      chlorhexidine 4% Liquid 1 Application(s) Topical <User Schedule>  folic acid 1 milliGRAM(s) Oral daily  hydrocortisone 1% Cream 1 Application(s) Topical every 12 hours  Infuse [facility] Product      lidocaine/prilocaine Cream (Chemo) 1 Application(s) Topical daily  loratadine 10 milliGRAM(s) Oral daily  multivitamin 1 Tablet(s) Oral daily  pseudoephedrine 30 milliGRAM(s) Oral once  sodium bicarbonate  Infusion (Chemo) 0.074 mEq/kG/Hr IV Continuous <Continuous>  sodium chloride 0.9% 1000 milliLiter(s) IV Continuous <Continuous>  sodium chloride 0.9%. 1000 milliLiter(s) IV Continuous <Continuous>  sodium chloride 0.9%. (Chemo) 1000 milliLiter(s) IV Continuous <Continuous>      PRN:   acetaminophen     Tablet .. 650 milliGRAM(s) Oral every 6 hours PRN  diphenhydrAMINE 25 milliGRAM(s) Oral every 12 hours PRN  loperamide 2 milliGRAM(s) Oral every 4 hours PRN  prochlorperazine   Injectable 10 milliGRAM(s) IV Push every 6 hours PRN  pseudoephedrine 30 milliGRAM(s) Oral every 6 hours PRN  senna 1 Tablet(s) Oral at bedtime PRN  sodium chloride 0.9% lock flush 10 milliLiter(s) IV Push every 1 hour PRN      A/P: 37 year old male with severe aplastic anemia with multiple lines of treatment admitted for a haplo-identical bone marrow transplant with  Flu / Cy / TBI preparative regimen   Day + 12  start CTX hydration - 1/2NS @ 200ml /hr    Strict I&O, daily weights, prn diuresis   Day -6 - day -2 - Fludarabine 30mg/m2 IV  Day -6 - day - 5 - CTX 14.5mg/kg/day IV. CTX to start after urine SG < 1.010. Mesna  12mg / kg - hemorrhagic cystitis ppx   Day -2 - IVIG 0.4 g/kg (ideal body weight) every 3 weeks. Premedication with Tylenol and benadryl   Day -1 -  cGy x 1 dose   Day - 1 - at 2200 begin transplant hydration : 0.45Ns + 1 amp (50mEq) sodium bicarbonate at 150ml /hr; continue transplant hydration for 24 hours post infusion    Day + 2 at 2200 - begin CTX hydration (0.45NS + 10 mEq KCl/ @150ml /m2  continue 24 hours post last dose of CTX   Day + 3 - + 4 - CTX 50mg/kg/day IV. Begin CTX when urine SG < 1.010. EKG daily. Mesna for hemorraghic cystitis ppx.   Day + 5 - start Zarxio,  MMF and Tacrolimus. Check Tacrolimus levels on Monday and Thursday.   when ANC < 500, start Cipro 500mg po BID. If becomes febrile, pan cx, CXR and change Cipro to Cefepime 2g IV q 8 hours. Continue until count recovery  - Fludarabine , intermittent nausea, continue antiemetics   - Fludarabine , IVIG today    TBI today  - HPC transplant today. Continue transplant hydration for 24 hours post infusion of bone marrow   - monitor for CRS / haplo storm - if T >/= 102.5 on days +1 or +2, would dose tocilizumab 8mg / kg IVPB x 1    C.Diff negative, started imodium  - post transplant CTX 2.2 - continue CTX hydration for 24 hours post infusion of last dose , Tosilizumab x1 dose O/N for temp of 39.1.   - Start tacrolimus / MMF today   6/15- persistent nausea, restart zofran ATC     1. Infectious Disease:   acyclovir   Oral Tab/Cap 400 milliGRAM(s) Oral every 8 hours  cefepime   IVPB 2000 milliGRAM(s) IV Intermittent every 8 hours  cefepime   IVPB      clotrimazole Lozenge 1 Lozenge Oral five times a day  fluconAZOLE   Tablet 400 milliGRAM(s) Oral daily    2. VOD Prophylaxis: Actigall, Glutamine, Heparin (dosed at 100 units / kg / day)     3. GI Prophylaxis:   famotidine    Tablet 20 milliGRAM(s) Oral two times a day    4. Mouthcare - NS / NaHCO3 rinses, Mycelex, Biotene; Skin care     5. GVHD prophylaxis   TBI day -1   CTX days +3, +4   mycophenolate mofetil 1000 milliGRAM(s) Oral three times a day  tacrolimus 3 milliGRAM(s) Oral <User Schedule>    6. Transfuse & replete electrolytes prn   1 bag platelets    7. IV hydration, daily weights, strict I&O, prn diuresis     8. PO intake as tolerated, nutrition follow up as needed, MVI, folic acid     9. Antiemetics, anti-diarrhea medications:   loperamide 2 milliGRAM(s) Oral every 4 hours PRN  prochlorperazine   Injectable 10 milliGRAM(s) IV Push every 6 hours PRN    10. OOB as tolerated, physical therapy consult if needed     11. Monitor coags / fibrinogen 2x week, vitamin K as needed     12. Monitor closely for clinical changes, monitor for fevers     13. Emotional support provided, plan of care discussed and questions addressed     14. Patient education done regarding plan of care, restrictions and discharge planning     15. Continue regular social work input     I have written the above note for Dr. Lima who performed service with me in the room.   Mila De Guzman NP-C ([phone])    I have seen and examined patient with NP, I agree with above note as scribed.

## 2023-06-21 LAB
ALBUMIN SERPL ELPH-MCNC: 4.2 G/DL — SIGNIFICANT CHANGE UP (ref 3.3–5)
ALP SERPL-CCNC: 83 U/L — SIGNIFICANT CHANGE UP (ref 40–120)
ALT FLD-CCNC: 31 U/L — SIGNIFICANT CHANGE UP (ref 10–45)
ANION GAP SERPL CALC-SCNC: 11 MMOL/L — SIGNIFICANT CHANGE UP (ref 5–17)
AST SERPL-CCNC: 12 U/L — SIGNIFICANT CHANGE UP (ref 10–40)
BILIRUB DIRECT SERPL-MCNC: 0.2 MG/DL — SIGNIFICANT CHANGE UP (ref 0–0.3)
BILIRUB INDIRECT FLD-MCNC: 0.7 MG/DL — SIGNIFICANT CHANGE UP (ref 0.2–1)
BILIRUB SERPL-MCNC: 0.9 MG/DL — SIGNIFICANT CHANGE UP (ref 0.2–1.2)
BLD GP AB SCN SERPL QL: NEGATIVE — SIGNIFICANT CHANGE UP
BUN SERPL-MCNC: 14 MG/DL — SIGNIFICANT CHANGE UP (ref 7–23)
CALCIUM SERPL-MCNC: 9.3 MG/DL — SIGNIFICANT CHANGE UP (ref 8.4–10.5)
CHLORIDE SERPL-SCNC: 104 MMOL/L — SIGNIFICANT CHANGE UP (ref 96–108)
CO2 SERPL-SCNC: 24 MMOL/L — SIGNIFICANT CHANGE UP (ref 22–31)
CREAT SERPL-MCNC: 0.65 MG/DL — SIGNIFICANT CHANGE UP (ref 0.5–1.3)
EGFR: 126 ML/MIN/1.73M2 — SIGNIFICANT CHANGE UP
GLUCOSE SERPL-MCNC: 100 MG/DL — HIGH (ref 70–99)
HCT VFR BLD CALC: 19 % — CRITICAL LOW (ref 39–50)
HGB BLD-MCNC: 6.6 G/DL — CRITICAL LOW (ref 13–17)
LDH SERPL L TO P-CCNC: 122 U/L — SIGNIFICANT CHANGE UP (ref 50–242)
MAGNESIUM SERPL-MCNC: 1.6 MG/DL — SIGNIFICANT CHANGE UP (ref 1.6–2.6)
MCHC RBC-ENTMCNC: 30.7 PG — SIGNIFICANT CHANGE UP (ref 27–34)
MCHC RBC-ENTMCNC: 34.7 GM/DL — SIGNIFICANT CHANGE UP (ref 32–36)
MCV RBC AUTO: 88.4 FL — SIGNIFICANT CHANGE UP (ref 80–100)
NRBC # BLD: 0 /100 WBCS — SIGNIFICANT CHANGE UP (ref 0–0)
PHOSPHATE SERPL-MCNC: 4.1 MG/DL — SIGNIFICANT CHANGE UP (ref 2.5–4.5)
PLATELET # BLD AUTO: 7 K/UL — CRITICAL LOW (ref 150–400)
POTASSIUM SERPL-MCNC: 4.1 MMOL/L — SIGNIFICANT CHANGE UP (ref 3.5–5.3)
POTASSIUM SERPL-SCNC: 4.1 MMOL/L — SIGNIFICANT CHANGE UP (ref 3.5–5.3)
PROT SERPL-MCNC: 6.4 G/DL — SIGNIFICANT CHANGE UP (ref 6–8.3)
RBC # BLD: 2.15 M/UL — LOW (ref 4.2–5.8)
RBC # FLD: 13 % — SIGNIFICANT CHANGE UP (ref 10.3–14.5)
RH IG SCN BLD-IMP: POSITIVE — SIGNIFICANT CHANGE UP
SODIUM SERPL-SCNC: 139 MMOL/L — SIGNIFICANT CHANGE UP (ref 135–145)
WBC # BLD: <0.1 K/UL — CRITICAL LOW (ref 3.8–10.5)
WBC # FLD AUTO: <0.1 K/UL — CRITICAL LOW (ref 3.8–10.5)

## 2023-06-21 PROCEDURE — 99233 SBSQ HOSP IP/OBS HIGH 50: CPT

## 2023-06-21 RX ORDER — OXYCODONE HYDROCHLORIDE 5 MG/1
5 TABLET ORAL EVERY 4 HOURS
Refills: 0 | Status: DISCONTINUED | OUTPATIENT
Start: 2023-06-21 | End: 2023-06-24

## 2023-06-21 RX ORDER — HYDROCORTISONE 20 MG
50 TABLET ORAL ONCE
Refills: 0 | Status: COMPLETED | OUTPATIENT
Start: 2023-06-21 | End: 2023-06-21

## 2023-06-21 RX ORDER — DIPHENHYDRAMINE HCL 50 MG
50 CAPSULE ORAL ONCE
Refills: 0 | Status: COMPLETED | OUTPATIENT
Start: 2023-06-21 | End: 2023-06-21

## 2023-06-21 RX ORDER — DIPHENHYDRAMINE HCL 50 MG
50 CAPSULE ORAL ONCE
Refills: 0 | Status: DISCONTINUED | OUTPATIENT
Start: 2023-06-21 | End: 2023-06-21

## 2023-06-21 RX ORDER — LETERMOVIR 480 MG/1
480 TABLET, FILM COATED ORAL DAILY
Refills: 0 | Status: DISCONTINUED | OUTPATIENT
Start: 2023-06-22 | End: 2023-06-30

## 2023-06-21 RX ADMIN — MYCOPHENOLATE MOFETIL 1000 MILLIGRAM(S): 250 CAPSULE ORAL at 14:26

## 2023-06-21 RX ADMIN — Medication 5 MILLILITER(S): at 17:38

## 2023-06-21 RX ADMIN — PIPERACILLIN AND TAZOBACTAM 25 GRAM(S): 4; .5 INJECTION, POWDER, LYOPHILIZED, FOR SOLUTION INTRAVENOUS at 05:45

## 2023-06-21 RX ADMIN — HEPARIN SODIUM 4.24 UNIT(S)/HR: 5000 INJECTION INTRAVENOUS; SUBCUTANEOUS at 19:57

## 2023-06-21 RX ADMIN — TACROLIMUS 3 MILLIGRAM(S): 5 CAPSULE ORAL at 08:36

## 2023-06-21 RX ADMIN — Medication 650 MILLIGRAM(S): at 08:40

## 2023-06-21 RX ADMIN — Medication 1 LOZENGE: at 23:02

## 2023-06-21 RX ADMIN — Medication 600 MILLIGRAM(S): at 02:55

## 2023-06-21 RX ADMIN — OXYCODONE HYDROCHLORIDE 5 MILLIGRAM(S): 5 TABLET ORAL at 18:44

## 2023-06-21 RX ADMIN — Medication 10 MILLILITER(S): at 17:39

## 2023-06-21 RX ADMIN — Medication 1 LOZENGE: at 19:54

## 2023-06-21 RX ADMIN — Medication 10 MILLILITER(S): at 19:56

## 2023-06-21 RX ADMIN — Medication 50 MILLIGRAM(S): at 08:40

## 2023-06-21 RX ADMIN — Medication 2 MILLIGRAM(S): at 18:05

## 2023-06-21 RX ADMIN — Medication 1 MILLIGRAM(S): at 12:57

## 2023-06-21 RX ADMIN — Medication 600 MILLIGRAM(S): at 16:44

## 2023-06-21 RX ADMIN — FAMOTIDINE 20 MILLIGRAM(S): 10 INJECTION INTRAVENOUS at 17:39

## 2023-06-21 RX ADMIN — Medication 1 APPLICATION(S): at 17:40

## 2023-06-21 RX ADMIN — Medication 5 MILLILITER(S): at 08:39

## 2023-06-21 RX ADMIN — Medication 400 MILLIGRAM(S): at 20:40

## 2023-06-21 RX ADMIN — Medication 5 MILLILITER(S): at 19:54

## 2023-06-21 RX ADMIN — Medication 650 MILLIGRAM(S): at 21:00

## 2023-06-21 RX ADMIN — Medication 1 LOZENGE: at 08:39

## 2023-06-21 RX ADMIN — Medication 650 MILLIGRAM(S): at 21:30

## 2023-06-21 RX ADMIN — Medication 1 TABLET(S): at 12:57

## 2023-06-21 RX ADMIN — URSODIOL 300 MILLIGRAM(S): 250 TABLET, FILM COATED ORAL at 17:39

## 2023-06-21 RX ADMIN — SODIUM CHLORIDE 20 MILLILITER(S): 9 INJECTION INTRAMUSCULAR; INTRAVENOUS; SUBCUTANEOUS at 19:57

## 2023-06-21 RX ADMIN — TACROLIMUS 3 MILLIGRAM(S): 5 CAPSULE ORAL at 19:56

## 2023-06-21 RX ADMIN — Medication 480 MICROGRAM(S): at 14:25

## 2023-06-21 RX ADMIN — FLUCONAZOLE 400 MILLIGRAM(S): 150 TABLET ORAL at 12:57

## 2023-06-21 RX ADMIN — OXYCODONE HYDROCHLORIDE 5 MILLIGRAM(S): 5 TABLET ORAL at 22:10

## 2023-06-21 RX ADMIN — Medication 10 MILLILITER(S): at 08:39

## 2023-06-21 RX ADMIN — OXYCODONE HYDROCHLORIDE 5 MILLIGRAM(S): 5 TABLET ORAL at 18:05

## 2023-06-21 RX ADMIN — Medication 400 MILLIGRAM(S): at 06:17

## 2023-06-21 RX ADMIN — Medication 1 LOZENGE: at 12:58

## 2023-06-21 RX ADMIN — Medication 5 MILLILITER(S): at 23:02

## 2023-06-21 RX ADMIN — CHLORHEXIDINE GLUCONATE 1 APPLICATION(S): 213 SOLUTION TOPICAL at 06:16

## 2023-06-21 RX ADMIN — MYCOPHENOLATE MOFETIL 1000 MILLIGRAM(S): 250 CAPSULE ORAL at 20:40

## 2023-06-21 RX ADMIN — PIPERACILLIN AND TAZOBACTAM 25 GRAM(S): 4; .5 INJECTION, POWDER, LYOPHILIZED, FOR SOLUTION INTRAVENOUS at 20:41

## 2023-06-21 RX ADMIN — Medication 10 MILLIGRAM(S): at 02:59

## 2023-06-21 RX ADMIN — Medication 10 MILLIGRAM(S): at 20:41

## 2023-06-21 RX ADMIN — FAMOTIDINE 20 MILLIGRAM(S): 10 INJECTION INTRAVENOUS at 06:17

## 2023-06-21 RX ADMIN — Medication 10 MILLILITER(S): at 23:02

## 2023-06-21 RX ADMIN — Medication 10 MILLILITER(S): at 12:58

## 2023-06-21 RX ADMIN — URSODIOL 300 MILLIGRAM(S): 250 TABLET, FILM COATED ORAL at 06:17

## 2023-06-21 RX ADMIN — Medication 5 MILLILITER(S): at 12:58

## 2023-06-21 RX ADMIN — Medication 1 APPLICATION(S): at 06:45

## 2023-06-21 RX ADMIN — PIPERACILLIN AND TAZOBACTAM 25 GRAM(S): 4; .5 INJECTION, POWDER, LYOPHILIZED, FOR SOLUTION INTRAVENOUS at 12:58

## 2023-06-21 RX ADMIN — Medication 400 MILLIGRAM(S): at 14:26

## 2023-06-21 RX ADMIN — OXYCODONE HYDROCHLORIDE 5 MILLIGRAM(S): 5 TABLET ORAL at 23:00

## 2023-06-21 RX ADMIN — MYCOPHENOLATE MOFETIL 1000 MILLIGRAM(S): 250 CAPSULE ORAL at 06:17

## 2023-06-21 RX ADMIN — Medication 1 LOZENGE: at 17:38

## 2023-06-21 NOTE — PHARMACOTHERAPY INTERVENTION NOTE - COMMENTS
37-year-old male with severe aplastic anemia previously treated with eATG +CsA + eltrombopag in 2020. He relapsed 2021 and was treated with rATG + CSA + prednisone + eltombopag and then ravulizumab. He is here for a haploidentical allogeneic stem cell transplant, today is day +13. Course has been complicated by intermittent nausea, haplostorm, and now symptoms of pansinusitis (on zosyn, day 2 of 10 day course).    Patient is also CMV seropositive and is getting post transplant cyclophosphamide for GVHD prophylaxis, he is at high-risk for CMV reactivation. Confirmed plan is to start letermovir for prophylaxis is on day +14 (23). Placed order in eMAR and confirmed coverage for patient. Will monitor and adjust tacrolimus accordingly, as letermovir is a CY inhibitor.    Asmita Carranza, PharmD, [facility]  Stem Cell Transplant Clinical Pharmacy Specialist

## 2023-06-21 NOTE — PROGRESS NOTE ADULT - SUBJECTIVE AND OBJECTIVE BOX
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 () with Flu / Cy / TBI prep regimen for treatment of severe aplastic anemia    S: Patient seen and examined with HPC Transplant Team:        O: Vitals:   Vital Signs Last 24 Hrs  T(C): 36.8 (2023 06:00), Max: 37.2 (2023 21:12)  T(F): 98.2 (2023 06:00), Max: 99 (2023 21:12)  HR: 100 (2023 06:00) (88 - 106)  BP: 110/70 (2023 06:00) (108/65 - 129/75)  BP(mean): --  RR: 18 (2023 06:00) (17 - 19)  SpO2: 99% (2023 06:00) (98% - 100%)    Parameters below as of 2023 21:12  Patient On (Oxygen Delivery Method): room air      Admit weight: 102.8kg   Daily Weight in k.5 (2023 10:00)  Today's weight:     Intake / Output:   06-20 @ 07:01  -  06-21 @ 07:00  --------------------------------------------------------  IN: 1707 mL / OUT: 1900 mL / NET: -193 mL      PE:   Oropharynx: no erythema or ulcers  Oral Mucositis: (-)                                                        Grade: -  CVS: RRR, +S1,S2  Lungs: CTA B/L  Abdomen: Soft, NT, ND, +BS  Extremities: No edema in BLE  Gastric Mucositis: (-)                                                 Grade: -  Intestinal Mucositis:   (-)                                           Grade: -  Skin: no rash other than the spots from platelet transfusion.   TLC: CDI  Neuro: Alert, O x 3  Pain: Denies      Labs:   CBC Full  -  ( 2023 06:47 )  WBC Count : <0.1 K/uL  Hemoglobin : 6.6 g/dL  Hematocrit : 19.0 %  Platelet Count - Automated : 7 K/uL  Mean Cell Volume : 88.4 fl  Mean Cell Hemoglobin : 30.7 pg  Mean Cell Hemoglobin Concentration : 34.7 gm/dL  Auto Neutrophil # : x  Auto Lymphocyte # : x  Auto Monocyte # : x  Auto Eosinophil # : x  Auto Basophil # : x  Auto Neutrophil % : x  Auto Lymphocyte % : x  Auto Monocyte % : x  Auto Eosinophil % : x  Auto Basophil % : x                          6.6    <0.1  )-----------( 7        ( 2023 06:47 )             19.0         139  |  104  |  14  ----------------------------<  100<H>  4.1   |  24  |  0.65    Ca    9.3      2023 06:48  Phos  4.1       Mg     1.6         TPro  6.4  /  Alb  4.2  /  TBili  0.9  /  DBili  0.2  /  AST  12  /  ALT  31  /  AlkPhos  83        LIVER FUNCTIONS - ( 2023 06:48 )  Alb: 4.2 g/dL / Pro: 6.4 g/dL / ALK PHOS: 83 U/L / ALT: 31 U/L / AST: 12 U/L / GGT: x           Lactate Dehydrogenase, Serum: 122 U/L ( @ 06:48)    Cultures:   Culture - Blood (06.10.23 @ 21:45)    Specimen Source: .Blood Blood-Catheter   Culture Results:   No Growth Final    Culture - Urine (06.10.23 @ 22:18)    Specimen Source: Clean Catch Clean Catch (Midstream)   Culture Results:   No growth    Radiology:   Xray Chest 1 View- PORTABLE-Urgent (Xray Chest 1 View- PORTABLE-Urgent .) (06.10.23 @ 22:38) >  Clear lungs.    Meds:   Antimicrobials:   acyclovir   Oral Tab/Cap 400 milliGRAM(s) Oral every 8 hours  clotrimazole Lozenge 1 Lozenge Oral five times a day  fluconAZOLE   Tablet 400 milliGRAM(s) Oral daily  piperacillin/tazobactam IVPB.. 4.5 Gram(s) IV Intermittent every 8 hours      Heme / Onc:   heparin  Infusion 424 Unit(s)/Hr IV Continuous <Continuous>      GI:  famotidine    Tablet 20 milliGRAM(s) Oral two times a day  loperamide 2 milliGRAM(s) Oral every 4 hours PRN  senna 1 Tablet(s) Oral at bedtime PRN  sodium bicarbonate Mouth Rinse 10 milliLiter(s) Swish and Spit five times a day  ursodiol Capsule 300 milliGRAM(s) Oral two times a day      Cardiovascular:       Immunologic:   filgrastim-sndz (ZARXIO) Injectable (Chemo) 480 MICROGram(s) SubCutaneous every 24 hours  mycophenolate mofetil 1000 milliGRAM(s) Oral three times a day  tacrolimus 3 milliGRAM(s) Oral <User Schedule>      Other medications:   acetaminophen     Tablet .. 650 milliGRAM(s) Oral every 6 hours  Biotene Dry Mouth Oral Rinse 5 milliLiter(s) Swish and Spit five times a day  Chemotherapy Worklist Order      chlorhexidine 4% Liquid 1 Application(s) Topical <User Schedule>  diphenhydrAMINE Injectable 50 milliGRAM(s) IV Push once  folic acid 1 milliGRAM(s) Oral daily  hydrocortisone 1% Cream 1 Application(s) Topical every 12 hours  hydrocortisone sodium succinate Injectable 50 milliGRAM(s) IV Push once  hydrocortisone sodium succinate Injectable 50 milliGRAM(s) IV Push every 24 hours  Infuse HPC Product      lidocaine/prilocaine Cream (Chemo) 1 Application(s) Topical daily  loratadine 10 milliGRAM(s) Oral daily  multivitamin 1 Tablet(s) Oral daily  sodium bicarbonate  Infusion (Chemo) 0.074 mEq/kG/Hr IV Continuous <Continuous>  sodium chloride 0.9% 1000 milliLiter(s) IV Continuous <Continuous>  sodium chloride 0.9%. 1000 milliLiter(s) IV Continuous <Continuous>  sodium chloride 0.9%. (Chemo) 1000 milliLiter(s) IV Continuous <Continuous>      PRN:   acetaminophen     Tablet .. 650 milliGRAM(s) Oral every 6 hours PRN  loperamide 2 milliGRAM(s) Oral every 4 hours PRN  prochlorperazine   Injectable 10 milliGRAM(s) IV Push every 6 hours PRN  pseudoephedrine 30 milliGRAM(s) Oral every 6 hours PRN  senna 1 Tablet(s) Oral at bedtime PRN  sodium chloride 0.9% lock flush 10 milliLiter(s) IV Push every 1 hour PRN    A/P: 37 year old male with severe aplastic anemia with multiple lines of treatment admitted for a haplo-identical bone marrow transplant with  Flu / Cy / TBI preparative regimen   Day + 13  start CTX hydration - 1/2NS @ 200ml /hr    Strict I&O, daily weights, prn diuresis   Day -6 - day -2 - Fludarabine 30mg/m2 IV  Day -6 - day - 5 - CTX 14.5mg/kg/day IV. CTX to start after urine SG < 1.010. Mes[place]  12mg / kg - hemorrhagic cystitis ppx   Day -2 - IVIG 0.4 g/kg (ideal body weight) every 3 weeks. Premedication with Tylenol and benadryl   Day -1 -  cGy x 1 dose   Day - 1 - at 2200 begin transplant hydration : 0.45Ns + 1 amp (50mEq) sodium bicarbonate at 150ml /hr; continue transplant hydration for 24 hours post infusion    Day + 2 at 0 - begin CTX hydration (0.45NS + 10 mEq KCl/ @150ml /m2  continue 24 hours post last dose of CTX   Day + 3 - + 4 - CTX 50mg/kg/day IV. Begin CTX when urine SG < 1.010. EKG daily. Mesna for hemorraghic cystitis ppx.   Day + 5 - start Zarxio,  MMF and Tacrolimus. Check Tacrolimus levels on Monday and Thursday.   when ANC < 500, start Cipro 500mg po BID. If becomes febrile, pan cx, CXR and change Cipro to Cefepime 2g IV q 8 hours. Continue until count recovery  - Fludarabine , intermittent nausea, continue antiemetics   - Fludarabine , IVIG today    TBI today  - HPC transplant today. Continue transplant hydration for 24 hours post infusion of bone marrow   - monitor for CRS / haplo storm - if T >/= 102.5 on days +1 or +2, would dose tocilizumab 8mg / kg IVPB x 1    C.Diff negative, started imodium  - post transplant CTX 2.2 - continue CTX hydration for 24 hours post infusion of last dose , Tosilizumab x1 dose O/N for temp of 39.1.     1. Infectious Disease:   acyclovir   Oral Tab/Cap 400 milliGRAM(s) Oral every 8 hours  clotrimazole Lozenge 1 Lozenge Oral five times a day  fluconAZOLE   Tablet 400 milliGRAM(s) Oral daily  piperacillin/tazobactam IVPB.. 4.5 Gram(s) IV Intermittent every 8 hours    2. VOD Prophylaxis: Actigall, Glutamine, Heparin (dosed at 100 units / kg / day)     3. GI Prophylaxis:    famotidine    Tablet 20 milliGRAM(s) Oral two times a day    4. Mouthcare - NS / NaHCO3 rinses, Mycelex, Biotene; Skin care     5. GVHD prophylaxis   TBI day -1   CTX days +3, +4   mycophenolate mofetil 1000 milliGRAM(s) Oral three times a day  tacrolimus 3 milliGRAM(s) Oral <User Schedule>    6. Transfuse & replete electrolytes prn   1 unit PRBC   1 bag platelets     7. IV hydration, daily weights, strict I&O, prn diuresis     8. PO intake as tolerated, nutrition follow up as needed, MVI, folic acid     9. Antiemetics, anti-diarrhea medications:   loperamide 2 milliGRAM(s) Oral every 4 hours PRN  prochlorperazine   Injectable 10 milliGRAM(s) IV Push every 6 hours PRN    10. OOB as tolerated, physical therapy consult if needed     11. Monitor coags / fibrinogen 2x week, vitamin K as needed     12. Monitor closely for clinical changes, monitor for fevers     13. Emotional support provided, plan of care discussed and questions addressed     14. Patient education done regarding plan of care, restrictions and discharge planning     15. Continue regular social work input     I have written the above note for Dr. Lima who performed service with me in the room.   Mila De Guzman NP-C ([phone])    I have seen and examined patient with NP, I agree with above note as scribed.                    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 () with Flu / Cy / TBI prep regimen for treatment of severe aplastic anemia    S: Patient seen and examined with HPC Transplant Team:   +fatigue   +sinus pressure/pain, nasal congestion, post nasal drip induced cough  +decreased appetite  +N/Vomited 1 x 330am       O: Vitals:   Vital Signs Last 24 Hrs  T(C): 36.8 (2023 06:00), Max: 37.2 (2023 21:12)  T(F): 98.2 (2023 06:00), Max: 99 (2023 21:12)  HR: 100 (2023 06:00) (88 - 106)  BP: 110/70 (2023 06:00) (108/65 - 129/75)  BP(mean): --  RR: 18 (2023 06:00) (17 - 19)  SpO2: 99% (2023 06:00) (98% - 100%)    Parameters below as of 2023 21:12  Patient On (Oxygen Delivery Method): room air      Admit weight: 102.8kg   Daily Weight in k.5 (2023 10:00)  Today's weight:     Intake / Output:   06-20 @ 07:01  -  06-21 @ 07:00  --------------------------------------------------------  IN: 1707 mL / OUT: 1900 mL / NET: -193 mL      PE:   Oropharynx: no erythema or ulcers  Oral Mucositis: (-)                                                        Grade: -  CVS: RRR, +S1,S2  Lungs: CTA B/L  Abdomen: Soft, NT, ND, +BS  Extremities: No edema in BLE  Gastric Mucositis: (-)                                                 Grade: -  Intestinal Mucositis:   (-)                                           Grade: -  Skin: no rash other than the spots from platelet transfusion.   TLC: CDI  Neuro: Alert, O x 3  Pain: Denies      Labs:   CBC Full  -  ( 2023 06:47 )  WBC Count : <0.1 K/uL  Hemoglobin : 6.6 g/dL  Hematocrit : 19.0 %  Platelet Count - Automated : 7 K/uL  Mean Cell Volume : 88.4 fl  Mean Cell Hemoglobin : 30.7 pg  Mean Cell Hemoglobin Concentration : 34.7 gm/dL  Auto Neutrophil # : x  Auto Lymphocyte # : x  Auto Monocyte # : x  Auto Eosinophil # : x  Auto Basophil # : x  Auto Neutrophil % : x  Auto Lymphocyte % : x  Auto Monocyte % : x  Auto Eosinophil % : x  Auto Basophil % : x                          6.6    <0.1  )-----------( 7        ( 2023 06:47 )             19.0         139  |  104  |  14  ----------------------------<  100<H>  4.1   |  24  |  0.65    Ca    9.3      2023 06:48  Phos  4.1       Mg     1.6         TPro  6.4  /  Alb  4.2  /  TBili  0.9  /  DBili  0.2  /  AST  12  /  ALT  31  /  AlkPhos  83        LIVER FUNCTIONS - ( 2023 06:48 )  Alb: 4.2 g/dL / Pro: 6.4 g/dL / ALK PHOS: 83 U/L / ALT: 31 U/L / AST: 12 U/L / GGT: x           Lactate Dehydrogenase, Serum: 122 U/L ( @ 06:48)    Cultures:   Culture - Blood (06.10.23 @ 21:45)    Specimen Source: .Blood Blood-Catheter   Culture Results:   No Growth Final    Culture - Urine (06.10.23 @ 22:18)    Specimen Source: Clean Catch Clean Catch (Midstream)   Culture Results:   No growth    Radiology:   Xray Chest 1 View- PORTABLE-Urgent (Xray Chest 1 View- PORTABLE-Urgent .) (06.10.23 @ 22:38) >  Clear lungs.    Meds:   Antimicrobials:   acyclovir   Oral Tab/Cap 400 milliGRAM(s) Oral every 8 hours  clotrimazole Lozenge 1 Lozenge Oral five times a day  fluconAZOLE   Tablet 400 milliGRAM(s) Oral daily  piperacillin/tazobactam IVPB.. 4.5 Gram(s) IV Intermittent every 8 hours      Heme / Onc:   heparin  Infusion 424 Unit(s)/Hr IV Continuous <Continuous>      GI:  famotidine    Tablet 20 milliGRAM(s) Oral two times a day  loperamide 2 milliGRAM(s) Oral every 4 hours PRN  senna 1 Tablet(s) Oral at bedtime PRN  sodium bicarbonate Mouth Rinse 10 milliLiter(s) Swish and Spit five times a day  ursodiol Capsule 300 milliGRAM(s) Oral two times a day      Cardiovascular:       Immunologic:   filgrastim-sndz (ZARXIO) Injectable (Chemo) 480 MICROGram(s) SubCutaneous every 24 hours  mycophenolate mofetil 1000 milliGRAM(s) Oral three times a day  tacrolimus 3 milliGRAM(s) Oral <User Schedule>      Other medications:   acetaminophen     Tablet .. 650 milliGRAM(s) Oral every 6 hours  Biotene Dry Mouth Oral Rinse 5 milliLiter(s) Swish and Spit five times a day  Chemotherapy Worklist Order      chlorhexidine 4% Liquid 1 Application(s) Topical <User Schedule>  diphenhydrAMINE Injectable 50 milliGRAM(s) IV Push once  folic acid 1 milliGRAM(s) Oral daily  hydrocortisone 1% Cream 1 Application(s) Topical every 12 hours  hydrocortisone sodium succinate Injectable 50 milliGRAM(s) IV Push once  hydrocortisone sodium succinate Injectable 50 milliGRAM(s) IV Push every 24 hours  Infuse HPC Product      lidocaine/prilocaine Cream (Chemo) 1 Application(s) Topical daily  loratadine 10 milliGRAM(s) Oral daily  multivitamin 1 Tablet(s) Oral daily  sodium bicarbonate  Infusion (Chemo) 0.074 mEq/kG/Hr IV Continuous <Continuous>  sodium chloride 0.9% 1000 milliLiter(s) IV Continuous <Continuous>  sodium chloride 0.9%. 1000 milliLiter(s) IV Continuous <Continuous>  sodium chloride 0.9%. (Chemo) 1000 milliLiter(s) IV Continuous <Continuous>      PRN:   acetaminophen     Tablet .. 650 milliGRAM(s) Oral every 6 hours PRN  loperamide 2 milliGRAM(s) Oral every 4 hours PRN  prochlorperazine   Injectable 10 milliGRAM(s) IV Push every 6 hours PRN  pseudoephedrine 30 milliGRAM(s) Oral every 6 hours PRN  senna 1 Tablet(s) Oral at bedtime PRN  sodium chloride 0.9% lock flush 10 milliLiter(s) IV Push every 1 hour PRN    A/P: 37 year old male with severe aplastic anemia with multiple lines of treatment admitted for a haplo-identical bone marrow transplant with  Flu / Cy / TBI preparative regimen   Day + 13  start CTX hydration - 1/2NS @ 200ml /hr    Strict I&O, daily weights, prn diuresis   Day -6 - day -2 - Fludarabine 30mg/m2 IV  Day -6 - day - 5 - CTX 14.5mg/kg/day IV. CTX to start after urine SG < 1.010. [place]  12mg / kg - hemorrhagic cystitis ppx   Day -2 - IVIG 0.4 g/kg (ideal body weight) every 3 weeks. Premedication with Tylenol and benadryl   Day -1 -  cGy x 1 dose   Day - 1 - at 2200 begin transplant hydration : 0.45Ns + 1 amp (50mEq) sodium bicarbonate at 150ml /hr; continue transplant hydration for 24 hours post infusion    Day + 2 at 2200 - begin CTX hydration (0.45NS + 10 mEq KCl/ @150ml /m2  continue 24 hours post last dose of CTX   Day + 3 - + 4 - CTX 50mg/kg/day IV. Begin CTX when urine SG < 1.010. EKG daily. Mesna for hemorraghic cystitis ppx.   Day + 5 - start Zarxio,  MMF and Tacrolimus. Check Tacrolimus levels on Monday and Thursday.   when ANC < 500, start Cipro 500mg po BID. If becomes febrile, pan cx, CXR and change Cipro to Cefepime 2g IV q 8 hours. Continue until count recovery  - Fludarabine , intermittent nausea, continue antiemetics   - Fludarabine , IVIG today    TBI today  - HPC transplant today. Continue transplant hydration for 24 hours post infusion of bone marrow   - monitor for CRS / haplo storm - if T >/= 102.5 on days +1 or +2, would dose tocilizumab 8mg / kg IVPB x 1    C.Diff negative, started imodium  - post transplant CTX 2.2 - continue CTX hydration for 24 hours post infusion of last dose , Tosilizumab x1 dose O/N for temp of 39.1.     1. Infectious Disease:   acyclovir   Oral Tab/Cap 400 milliGRAM(s) Oral every 8 hours  clotrimazole Lozenge 1 Lozenge Oral five times a day  fluconAZOLE   Tablet 400 milliGRAM(s) Oral daily  piperacillin/tazobactam IVPB.. 4.5 Gram(s) IV Intermittent every 8 hours    2. VOD Prophylaxis: Actigall, Glutamine, Heparin (dosed at 100 units / kg / day)     3. GI Prophylaxis:    famotidine    Tablet 20 milliGRAM(s) Oral two times a day    4. Mouthcare - NS / NaHCO3 rinses, Mycelex, Biotene; Skin care     5. GVHD prophylaxis   TBI day -1   CTX days +3, +4   mycophenolate mofetil 1000 milliGRAM(s) Oral three times a day  tacrolimus 3 milliGRAM(s) Oral <User Schedule>    6. Transfuse & replete electrolytes prn   1 unit PRBC   1 bag platelets     7. IV hydration, daily weights, strict I&O, prn diuresis     8. PO intake as tolerated, nutrition follow up as needed, MVI, folic acid     9. Antiemetics, anti-diarrhea medications:   loperamide 2 milliGRAM(s) Oral every 4 hours PRN  prochlorperazine   Injectable 10 milliGRAM(s) IV Push every 6 hours PRN    10. OOB as tolerated, physical therapy consult if needed     11. Monitor coags / fibrinogen 2x week, vitamin K as needed     12. Monitor closely for clinical changes, monitor for fevers     13. Emotional support provided, plan of care discussed and questions addressed     14. Patient education done regarding plan of care, restrictions and discharge planning     15. Continue regular social work input     I have written the above note for Dr. Lima who performed service with me in the room.   Mila De Guzman NP-C ([phone])    I have seen and examined patient with NP, I agree with above note as scribed.                    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 () with Flu / Cy / TBI prep regimen for treatment of severe aplastic anemia    S: Patient seen and examined with HPC Transplant Team:   +fatigue   +sinus pressure/pain, nasal congestion, post nasal drip induced cough  +decreased appetite  +N/Vomited 1 x 330am       O: Vitals:   Vital Signs Last 24 Hrs  T(C): 36.8 (2023 06:00), Max: 37.2 (2023 21:12)  T(F): 98.2 (2023 06:00), Max: 99 (:12)  HR: 100 (2023 06:00) (88 - 106)  BP: 110/70 (2023 06:00) (108/65 - 129/75)  BP(mean): --  RR: 18 (2023 06:00) (17 - 19)  SpO2: 99% (2023 06:00) (98% - 100%)    Parameters below as of 2023 21:12  Patient On (Oxygen Delivery Method): room air      Admit weight: 102.8kg   Daily Weight in k.5 (2023 10:00)  Today's weight: 96    Intake / Output:   -20 @ 07:01  -  06-21 @ 07:00  --------------------------------------------------------  IN: 1707 mL / OUT: 1900 mL / NET: -193 mL      PE:   Oropharynx: no erythema or ulcers  Oral Mucositis: (-)                                                        Grade: -  CVS: RRR, +S1,S2  Lungs: CTA B/L  Abdomen: Soft, NT, ND, +BS  Extremities: No edema in BLE  Gastric Mucositis: (-)                                                 Grade: -  Intestinal Mucositis:   (-)                                           Grade: -  Skin: no rash other than the spots from platelet transfusion.   TLC: CDI  Neuro: Alert, O x 3  Pain: Denies      Labs:   CBC Full  -  ( 2023 06:47 )  WBC Count : <0.1 K/uL  Hemoglobin : 6.6 g/dL  Hematocrit : 19.0 %  Platelet Count - Automated : 7 K/uL  Mean Cell Volume : 88.4 fl  Mean Cell Hemoglobin : 30.7 pg  Mean Cell Hemoglobin Concentration : 34.7 gm/dL  Auto Neutrophil # : x  Auto Lymphocyte # : x  Auto Monocyte # : x  Auto Eosinophil # : x  Auto Basophil # : x  Auto Neutrophil % : x  Auto Lymphocyte % : x  Auto Monocyte % : x  Auto Eosinophil % : x  Auto Basophil % : x                          6.6    <0.1  )-----------( 7        ( 2023 06:47 )             19.0         139  |  104  |  14  ----------------------------<  100<H>  4.1   |  24  |  0.65    Ca    9.3      2023 06:48  Phos  4.1       Mg     1.6         TPro  6.4  /  Alb  4.2  /  TBili  0.9  /  DBili  0.2  /  AST  12  /  ALT  31  /  AlkPhos  83        LIVER FUNCTIONS - ( 2023 06:48 )  Alb: 4.2 g/dL / Pro: 6.4 g/dL / ALK PHOS: 83 U/L / ALT: 31 U/L / AST: 12 U/L / GGT: x           Lactate Dehydrogenase, Serum: 122 U/L ( @ 06:48)    Cultures:   Culture - Blood (06.10.23 @ 21:45)    Specimen Source: .Blood Blood-Catheter   Culture Results:   No Growth Final    Culture - Urine (06.10.23 @ 22:18)    Specimen Source: Clean Catch Clean Catch (Midstream)   Culture Results:   No growth    Radiology:   Xray Chest 1 View- PORTABLE-Urgent (Xray Chest 1 View- PORTABLE-Urgent .) (06.10.23 @ 22:38) >  Clear lungs.    Meds:   Antimicrobials:   acyclovir   Oral Tab/Cap 400 milliGRAM(s) Oral every 8 hours  clotrimazole Lozenge 1 Lozenge Oral five times a day  fluconAZOLE   Tablet 400 milliGRAM(s) Oral daily  piperacillin/tazobactam IVPB.. 4.5 Gram(s) IV Intermittent every 8 hours      Heme / Onc:   heparin  Infusion 424 Unit(s)/Hr IV Continuous <Continuous>      GI:  famotidine    Tablet 20 milliGRAM(s) Oral two times a day  loperamide 2 milliGRAM(s) Oral every 4 hours PRN  senna 1 Tablet(s) Oral at bedtime PRN  sodium bicarbonate Mouth Rinse 10 milliLiter(s) Swish and Spit five times a day  ursodiol Capsule 300 milliGRAM(s) Oral two times a day      Cardiovascular:       Immunologic:   filgrastim-sndz (ZARXIO) Injectable (Chemo) 480 MICROGram(s) SubCutaneous every 24 hours  mycophenolate mofetil 1000 milliGRAM(s) Oral three times a day  tacrolimus 3 milliGRAM(s) Oral <User Schedule>      Other medications:   acetaminophen     Tablet .. 650 milliGRAM(s) Oral every 6 hours  Biotene Dry Mouth Oral Rinse 5 milliLiter(s) Swish and Spit five times a day  Chemotherapy Worklist Order      chlorhexidine 4% Liquid 1 Application(s) Topical <User Schedule>  diphenhydrAMINE Injectable 50 milliGRAM(s) IV Push once  folic acid 1 milliGRAM(s) Oral daily  hydrocortisone 1% Cream 1 Application(s) Topical every 12 hours  hydrocortisone sodium succinate Injectable 50 milliGRAM(s) IV Push once  hydrocortisone sodium succinate Injectable 50 milliGRAM(s) IV Push every 24 hours  Infuse HPC Product      lidocaine/prilocaine Cream (Chemo) 1 Application(s) Topical daily  loratadine 10 milliGRAM(s) Oral daily  multivitamin 1 Tablet(s) Oral daily  sodium bicarbonate  Infusion (Chemo) 0.074 mEq/kG/Hr IV Continuous <Continuous>  sodium chloride 0.9% 1000 milliLiter(s) IV Continuous <Continuous>  sodium chloride 0.9%. 1000 milliLiter(s) IV Continuous <Continuous>  sodium chloride 0.9%. (Chemo) 1000 milliLiter(s) IV Continuous <Continuous>      PRN:   acetaminophen     Tablet .. 650 milliGRAM(s) Oral every 6 hours PRN  loperamide 2 milliGRAM(s) Oral every 4 hours PRN  prochlorperazine   Injectable 10 milliGRAM(s) IV Push every 6 hours PRN  pseudoephedrine 30 milliGRAM(s) Oral every 6 hours PRN  senna 1 Tablet(s) Oral at bedtime PRN  sodium chloride 0.9% lock flush 10 milliLiter(s) IV Push every 1 hour PRN    A/P: 37 year old male with severe aplastic anemia with multiple lines of treatment admitted for a haplo-identical bone marrow transplant with  Flu / Cy / TBI preparative regimen   Day + 13  start CTX hydration - 1/2NS @ 200ml /hr    Strict I&O, daily weights, prn diuresis   Day -6 - day -2 - Fludarabine 30mg/m2 IV  Day -6 - day - 5 - CTX 14.5mg/kg/day IV. CTX to start after urine SG < 1.010. [place]  12mg / kg - hemorrhagic cystitis ppx   Day -2 - IVIG 0.4 g/kg (ideal body weight) every 3 weeks. Premedication with Tylenol and benadryl   Day -1 -  cGy x 1 dose   Day - 1 - at 2200 begin transplant hydration : 0.45Ns + 1 amp (50mEq) sodium bicarbonate at 150ml /hr; continue transplant hydration for 24 hours post infusion    Day + 2 at 2200 - begin CTX hydration (0.45NS + 10 mEq KCl/ @150ml /m2  continue 24 hours post last dose of CTX   Day + 3 - + 4 - CTX 50mg/kg/day IV. Begin CTX when urine SG < 1.010. EKG daily. Mesna for hemorraghic cystitis ppx.   Day + 5 - start Zarxio,  MMF and Tacrolimus. Check Tacrolimus levels on Monday and Thursday.   when ANC < 500, start Cipro 500mg po BID. If becomes febrile, pan cx, CXR and change Cipro to Cefepime 2g IV q 8 hours. Continue until count recovery  - Fludarabine , intermittent nausea, continue antiemetics   - Fludarabine , IVIG today    TBI today  - HPC transplant today. Continue transplant hydration for 24 hours post infusion of bone marrow   - monitor for CRS / haplo storm - if T >/= 102.5 on days +1 or +2, would dose tocilizumab 8mg / kg IVPB x 1    C.Diff negative, started imodium  - post transplant CTX 2.2 - continue CTX hydration for 24 hours post infusion of last dose , Tosilizumab x1 dose O/N for temp of 39.1.     1. Infectious Disease:   acyclovir   Oral Tab/Cap 400 milliGRAM(s) Oral every 8 hours  clotrimazole Lozenge 1 Lozenge Oral five times a day  fluconAZOLE   Tablet 400 milliGRAM(s) Oral daily  piperacillin/tazobactam IVPB.. 4.5 Gram(s) IV Intermittent every 8 hours    2. VOD Prophylaxis: Actigall, Glutamine, Heparin (dosed at 100 units / kg / day)     3. GI Prophylaxis:    famotidine    Tablet 20 milliGRAM(s) Oral two times a day    4. Mouthcare - NS / NaHCO3 rinses, Mycelex, Biotene; Skin care     5. GVHD prophylaxis   TBI day -1   CTX days +3, +4   mycophenolate mofetil 1000 milliGRAM(s) Oral three times a day  tacrolimus 3 milliGRAM(s) Oral <User Schedule>    6. Transfuse & replete electrolytes prn   1 unit PRBC   1 bag platelets     7. IV hydration, daily weights, strict I&O, prn diuresis     8. PO intake as tolerated, nutrition follow up as needed, MVI, folic acid     9. Antiemetics, anti-diarrhea medications:   loperamide 2 milliGRAM(s) Oral every 4 hours PRN  prochlorperazine   Injectable 10 milliGRAM(s) IV Push every 6 hours PRN    10. OOB as tolerated, physical therapy consult if needed     11. Monitor coags / fibrinogen 2x week, vitamin K as needed     12. Monitor closely for clinical changes, monitor for fevers     13. Emotional support provided, plan of care discussed and questions addressed     14. Patient education done regarding plan of care, restrictions and discharge planning     15. Continue regular social work input     I have written the above note for Dr. Lima who performed service with me in the room.   Mila De Guzman NP-C ([phone])    I have seen and examined patient with NP, I agree with above note as scribed.                    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 () with Flu / Cy / TBI prep regimen for treatment of severe aplastic anemia    S: Patient seen and examined with HPC Transplant Team:   +fatigue   +sinus pressure/pain, nasal congestion, post nasal drip induced cough  +decreased appetite  +N/Vomited 1 x 330am       O: Vitals:   Vital Signs Last 24 Hrs  T(C): 36.8 (2023 06:00), Max: 37.2 (2023 21:12)  T(F): 98.2 (2023 06:00), Max: 99 (2023 21:12)  HR: 100 (2023 06:00) (88 - 106)  BP: 110/70 (2023 06:00) (108/65 - 129/75)  BP(mean): --  RR: 18 (2023 06:00) (17 - 19)  SpO2: 99% (2023 06:00) (98% - 100%)    Parameters below as of 2023 21:12  Patient On (Oxygen Delivery Method): room air      Admit weight: 102.8kg   Daily Weight in k.5 (2023 10:00)  Today's weight: 96    Intake / Output:   -20 @ 07:01  -  06-21 @ 07:00  --------------------------------------------------------  IN: 1707 mL / OUT: 1900 mL / NET: -193 mL      PE:   Oropharynx: no erythema or ulcers  Oral Mucositis: (-)                                                        Grade: -  CVS: RRR, +S1,S2  Lungs: CTA B/L  Abdomen: Soft, NT, ND, +BS  Extremities: No edema in BLE  Gastric Mucositis: (-)                                                 Grade: -  Intestinal Mucositis:   (-)                                           Grade: -  Skin: no rash   TLC: CDI  Neuro: Alert, O x 3  Pain: Denies      Labs:   CBC Full  -  ( 2023 06:47 )  WBC Count : <0.1 K/uL  Hemoglobin : 6.6 g/dL  Hematocrit : 19.0 %  Platelet Count - Automated : 7 K/uL  Mean Cell Volume : 88.4 fl  Mean Cell Hemoglobin : 30.7 pg  Mean Cell Hemoglobin Concentration : 34.7 gm/dL  Auto Neutrophil # : x  Auto Lymphocyte # : x  Auto Monocyte # : x  Auto Eosinophil # : x  Auto Basophil # : x  Auto Neutrophil % : x  Auto Lymphocyte % : x  Auto Monocyte % : x  Auto Eosinophil % : x  Auto Basophil % : x                          6.6    <0.1  )-----------( 7        ( 2023 06:47 )             19.0         139  |  104  |  14  ----------------------------<  100<H>  4.1   |  24  |  0.65    Ca    9.3      2023 06:48  Phos  4.1       Mg     1.6         TPro  6.4  /  Alb  4.2  /  TBili  0.9  /  DBili  0.2  /  AST  12  /  ALT  31  /  AlkPhos  83        LIVER FUNCTIONS - ( 2023 06:48 )  Alb: 4.2 g/dL / Pro: 6.4 g/dL / ALK PHOS: 83 U/L / ALT: 31 U/L / AST: 12 U/L / GGT: x           Lactate Dehydrogenase, Serum: 122 U/L ( @ 06:48)    Cultures:     Culture - Blood (06.10.23 @ 21:45)    Specimen Source: .Blood Blood-Catheter   Culture Results:   No Growth Final    Culture - Urine (06.10.23 @ 22:18)    Specimen Source: Clean Catch Clean Catch (Midstream)   Culture Results:   No growth    Radiology:     Xray Chest 1 View- PORTABLE-Urgent (Xray Chest 1 View- PORTABLE-Urgent .) (06.10.23 @ 22:38) >  Clear lungs.    Meds:   Antimicrobials:   acyclovir   Oral Tab/Cap 400 milliGRAM(s) Oral every 8 hours  clotrimazole Lozenge 1 Lozenge Oral five times a day  fluconAZOLE   Tablet 400 milliGRAM(s) Oral daily  piperacillin/tazobactam IVPB.. 4.5 Gram(s) IV Intermittent every 8 hours      Heme / Onc:   heparin  Infusion 424 Unit(s)/Hr IV Continuous <Continuous>      GI:  famotidine    Tablet 20 milliGRAM(s) Oral two times a day  loperamide 2 milliGRAM(s) Oral every 4 hours PRN  senna 1 Tablet(s) Oral at bedtime PRN  sodium bicarbonate Mouth Rinse 10 milliLiter(s) Swish and Spit five times a day  ursodiol Capsule 300 milliGRAM(s) Oral two times a day      Cardiovascular:       Immunologic:   filgrastim-sndz (ZARXIO) Injectable (Chemo) 480 MICROGram(s) SubCutaneous every 24 hours  mycophenolate mofetil 1000 milliGRAM(s) Oral three times a day  tacrolimus 3 milliGRAM(s) Oral <User Schedule>      Other medications:   acetaminophen     Tablet .. 650 milliGRAM(s) Oral every 6 hours  Biotene Dry Mouth Oral Rinse 5 milliLiter(s) Swish and Spit five times a day  Chemotherapy Worklist Order      chlorhexidine 4% Liquid 1 Application(s) Topical <User Schedule>  diphenhydrAMINE Injectable 50 milliGRAM(s) IV Push once  folic acid 1 milliGRAM(s) Oral daily  hydrocortisone 1% Cream 1 Application(s) Topical every 12 hours  hydrocortisone sodium succinate Injectable 50 milliGRAM(s) IV Push once  hydrocortisone sodium succinate Injectable 50 milliGRAM(s) IV Push every 24 hours  Infuse HPC Product      lidocaine/prilocaine Cream (Chemo) 1 Application(s) Topical daily  loratadine 10 milliGRAM(s) Oral daily  multivitamin 1 Tablet(s) Oral daily  sodium bicarbonate  Infusion (Chemo) 0.074 mEq/kG/Hr IV Continuous <Continuous>  sodium chloride 0.9% 1000 milliLiter(s) IV Continuous <Continuous>  sodium chloride 0.9%. 1000 milliLiter(s) IV Continuous <Continuous>  sodium chloride 0.9%. (Chemo) 1000 milliLiter(s) IV Continuous <Continuous>      PRN:   acetaminophen     Tablet .. 650 milliGRAM(s) Oral every 6 hours PRN  loperamide 2 milliGRAM(s) Oral every 4 hours PRN  prochlorperazine   Injectable 10 milliGRAM(s) IV Push every 6 hours PRN  pseudoephedrine 30 milliGRAM(s) Oral every 6 hours PRN  senna 1 Tablet(s) Oral at bedtime PRN  sodium chloride 0.9% lock flush 10 milliLiter(s) IV Push every 1 hour PRN    A/P: 37 year old male with severe aplastic anemia with multiple lines of treatment admitted for a haplo-identical bone marrow transplant with  Flu / Cy / TBI preparative regimen   Day + 13  start CTX hydration - 1/2NS @ 200ml /hr    Strict I&O, daily weights, prn diuresis   Day -6 - day -2 - Fludarabine 30mg/m2 IV  Day -6 - day - 5 - CTX 14.5mg/kg/day IV. CTX to start after urine SG < 1.010. Mesna  12mg / kg - hemorrhagic cystitis ppx   Day -2 - IVIG 0.4 g/kg (ideal body weight) every 3 weeks. Premedication with Tylenol and benadryl   Day -1 -  cGy x 1 dose   Day - 1 - at 2200 begin transplant hydration : 0.45Ns + 1 amp (50mEq) sodium bicarbonate at 150ml /hr; continue transplant hydration for 24 hours post infusion    Day + 2 at 2200 - begin CTX hydration (0.45NS + 10 mEq KCl/ @150ml /m2  continue 24 hours post last dose of CTX   Day + 3 - + 4 - CTX 50mg/kg/day IV. Begin CTX when urine SG < 1.010. EKG daily. Mesna for hemorraghic cystitis ppx.   Day + 5 - start Zarxio,  MMF and Tacrolimus. Check Tacrolimus levels on Monday and Thursday.   when ANC < 500, start Cipro 500mg po BID. If becomes febrile, pan cx, CXR and change Cipro to Cefepime 2g IV q 8 hours. Continue until count recovery  - Fludarabine , intermittent nausea, continue antiemetics   - Fludarabine , IVIG today    TBI today  - HPC transplant today. Continue transplant hydration for 24 hours post infusion of bone marrow   - monitor for CRS / haplo storm - if T >/= 102.5 on days +1 or +2, would dose tocilizumab 8mg / kg IVPB x 1    C.Diff negative, started imodium  - post transplant CTX 2.2 - continue CTX hydration for 24 hours post infusion of last dose , Tosilizumab x1 dose O/N for temp of 39.1.     1. Infectious Disease:   acyclovir   Oral Tab/Cap 400 milliGRAM(s) Oral every 8 hours  clotrimazole Lozenge 1 Lozenge Oral five times a day  fluconAZOLE   Tablet 400 milliGRAM(s) Oral daily  piperacillin/tazobactam IVPB.. 4.5 Gram(s) IV Intermittent every 8 hours    2. VOD Prophylaxis: Actigall, Glutamine, Heparin (dosed at 100 units / kg / day)     3. GI Prophylaxis:    famotidine    Tablet 20 milliGRAM(s) Oral two times a day    4. Mouthcare - NS / NaHCO3 rinses, Mycelex, Biotene; Skin care     5. GVHD prophylaxis   TBI day -1   CTX days +3, +4   mycophenolate mofetil 1000 milliGRAM(s) Oral three times a day  tacrolimus 3 milliGRAM(s) Oral <User Schedule>    6. Transfuse & replete electrolytes prn   1 unit PRBC   1 bag platelets     7. IV hydration, daily weights, strict I&O, prn diuresis     8. PO intake as tolerated, nutrition follow up as needed, MVI, folic acid     9. Antiemetics, anti-diarrhea medications:   loperamide 2 milliGRAM(s) Oral every 4 hours PRN  prochlorperazine   Injectable 10 milliGRAM(s) IV Push every 6 hours PRN    10. OOB as tolerated, physical therapy consult if needed     11. Monitor coags / fibrinogen 2x week, vitamin K as needed     12. Monitor closely for clinical changes, monitor for fevers     13. Emotional support provided, plan of care discussed and questions addressed     14. Patient education done regarding plan of care, restrictions and discharge planning     15. Continue regular social work input     I have written the above note for Dr. Lima who performed service with me in the room.   Mila De Guzman NP-C ([phone])    I have seen and examined patient with NP, I agree with above note as scribed.

## 2023-06-21 NOTE — PROGRESS NOTE ADULT - NS ATTEND AMEND GEN_ALL_CORE FT
1. Admit to BMTU Today is day + 13  2. TLC placement in IR   3. Day -6 - day + 5 - antiemetics   4. Day - 6 start CTX hydration - 1/2NS @ 200ml /hr    5. Strict I&O, daily weights, prn diuresis   6. Day -6 - day -2 - Fludarabine 30mg/m2 IV  7. Day -6 - day - 5 - CTX 14.5mg/kg/day IV. CTX to start after urine SG < 1.010. Mesna  12mg / kg - hemorrhagic cystitis ppx   8. Day -2 - IVIG 0.4 g/kg (ideal body weight) every 3 weeks. Premedication with Tylenol and benadryl  9. Day -1 -  cGy x 1 dose   10. Day - 1 - at 2200 begin transplant hydration : 0.45Ns + 1 amp (50mEq) sodium bicarbonate at 150ml /hr; continue transplant hydration for 24 hours post infusion   11. Day 0 – HPC transplant   12. Day + 2 at 2200 - begin CTX hydration (0.45NS + 10 mEq KCl/ @150ml /m2  continue 24 hours post last dose of CTX   13. Day + 3 - + 4 - CTX 50mg/kg/day IV. Begin CTX when urine SG < 1.010. EKG daily. Mesna for hemorraghic cystitis ppx.   14. Day + 5 - start Zarxio,  MMF and Tacrolimus. Check Tacrolimus levels on Monday and Thursday.   15. Anxiolytics, antinausea, antidiarrhea medications as needed   16. Lasix PRN while being aggressively hydrated to avoid VOD   17. Nutritional support, pain management  Need for prophylactic measures:  1. VOD prophylaxis - low dose heparin gtt (dosed at 100 units / kg / day), glutamine supplementation, Actigall BID   2. PCP prophylaxis - Bactrim DS through day -2    3. Antiviral prophylaxis - Continue Acyclovir   4. Antifungal prophylaxis- Diflucan 400 mg po daily.  5.. GI prophylaxis - Protonix po QD   6. Antibacterial prophylaxis -  ANC < 500, started Cipro 500mg po BID. If becomes febrile, pan cx, CXR and change Cipro to Cefepime 2g IV q 8 hours. Continue until count recovery..slight rise in wbc noted after infusion of marrow  7. Aggressive mouth care and skin care as per protocol  8. GVHD prophylaxis- high dose CTX; MMF and Tacrolimus.  9. transfusion and electrolyte support    Patient with hemoglobin drop of ~2  on 6/4, likely due to chemotherapy however will repeat CBC tonight to establish stability, likely will require transfusion.  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  6/11 Febrile to 100.6F. Cipro switched to cefepime. Infectious work up ordered.  6/12 day 4 ctx today...given toci for haplo storm..some loose bowel, cdiff neg  6/13 mmf, tacro and zarxio.  6/16 overall doing well..on tacro 2 mg..repeat on monday..goal 5 to 10 1. Admit to BMTU Today is day + 13  2. TLC placement in IR   3. Day -6 - day + 5 - antiemetics   4. Day - 6 start CTX hydration - 1/2NS @ 200ml /hr    5. Strict I&O, daily weights, prn diuresis   6. Day -6 - day -2 - Fludarabine 30mg/m2 IV  7. Day -6 - day - 5 - CTX 14.5mg/kg/day IV. CTX to start after urine SG < 1.010. Mesna  12mg / kg - hemorrhagic cystitis ppx   8. Day -2 - IVIG 0.4 g/kg (ideal body weight) every 3 weeks. Premedication with Tylenol and benadryl  9. Day -1 -  cGy x 1 dose   10. Day - 1 - at 2200 begin transplant hydration : 0.45Ns + 1 amp (50mEq) sodium bicarbonate at 150ml /hr; continue transplant hydration for 24 hours post infusion   11. Day 0 – HPC transplant   12. Day + 2 at 2200 - begin CTX hydration (0.45NS + 10 mEq KCl/ @150ml /m2  continue 24 hours post last dose of CTX   13. Day + 3 - + 4 - CTX 50mg/kg/day IV. Begin CTX when urine SG < 1.010. EKG daily. Mesna for hemorraghic cystitis ppx.   14. Day + 5 - start Zarxio,  MMF and Tacrolimus. Check Tacrolimus levels on Monday and Thursday.   15. Anxiolytics, antinausea, antidiarrhea medications as needed   16. Lasix PRN while being aggressively hydrated to avoid VOD   17. Nutritional support, pain management  Need for prophylactic measures:  1. VOD prophylaxis - low dose heparin gtt (dosed at 100 units / kg / day), glutamine supplementation, Actigall BID   2. PCP prophylaxis - Bactrim DS through day -2    3. Antiviral prophylaxis - Continue Acyclovir   4. Antifungal prophylaxis- Diflucan 400 mg po daily.  5.. GI prophylaxis - Protonix po QD   6. Antibacterial prophylaxis -  ANC < 500, started Cipro 500mg po BID. If becomes febrile, pan cx, CXR and change Cipro to Cefepime 2g IV q 8 hours. Continue until count recovery..slight rise in wbc noted after infusion of marrow  7. Aggressive mouth care and skin care as per protocol  8. GVHD prophylaxis- high dose CTX; MMF and Tacrolimus.  9. transfusion and electrolyte support    Patient with hemoglobin drop of ~2  on 6/4, likely due to chemotherapy however will repeat CBC tonight to establish stability, likely will require transfusion.  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  6/11 Febrile to 100.6F. Cipro switched to cefepime. Infectious work up ordered.  6/12 day 4 ctx today...given toci for haplo storm..some loose bowel, cdiff neg  6/13 mmf, tacro and zarxio.  6/16 overall doing well..on tacro 2 mg..repeat on monday..goal 5 to 10    6/21- patient developed significant acute left sided facial pressure/pain in sinus region. During last hospitalization he had same symptoms diagnosed as pansinusitis and treated with Unasyn. Will broaden antibiotic coverage to Zosyn IV on 6/20/23; continue Acyclovir, Diflucan. If worsening or persistent symptoms will order CT of sinuses.

## 2023-06-22 LAB
ALBUMIN SERPL ELPH-MCNC: 4.3 G/DL — SIGNIFICANT CHANGE UP (ref 3.3–5)
ALP SERPL-CCNC: 84 U/L — SIGNIFICANT CHANGE UP (ref 40–120)
ALT FLD-CCNC: 32 U/L — SIGNIFICANT CHANGE UP (ref 10–45)
ANION GAP SERPL CALC-SCNC: 16 MMOL/L — SIGNIFICANT CHANGE UP (ref 5–17)
APTT BLD: 28.7 SEC — SIGNIFICANT CHANGE UP (ref 27.5–35.5)
AST SERPL-CCNC: 14 U/L — SIGNIFICANT CHANGE UP (ref 10–40)
BILIRUB SERPL-MCNC: 0.6 MG/DL — SIGNIFICANT CHANGE UP (ref 0.2–1.2)
BUN SERPL-MCNC: 11 MG/DL — SIGNIFICANT CHANGE UP (ref 7–23)
CALCIUM SERPL-MCNC: 8.8 MG/DL — SIGNIFICANT CHANGE UP (ref 8.4–10.5)
CHLORIDE SERPL-SCNC: 104 MMOL/L — SIGNIFICANT CHANGE UP (ref 96–108)
CO2 SERPL-SCNC: 24 MMOL/L — SIGNIFICANT CHANGE UP (ref 22–31)
CREAT SERPL-MCNC: 0.67 MG/DL — SIGNIFICANT CHANGE UP (ref 0.5–1.3)
EGFR: 125 ML/MIN/1.73M2 — SIGNIFICANT CHANGE UP
FIBRINOGEN PPP-MCNC: 187 MG/DL — LOW (ref 200–445)
GLUCOSE SERPL-MCNC: 100 MG/DL — HIGH (ref 70–99)
HCT VFR BLD CALC: 22.2 % — LOW (ref 39–50)
HGB BLD-MCNC: 7.9 G/DL — LOW (ref 13–17)
INR BLD: 1.15 RATIO — SIGNIFICANT CHANGE UP (ref 0.88–1.16)
LDH SERPL L TO P-CCNC: 122 U/L — SIGNIFICANT CHANGE UP (ref 50–242)
MAGNESIUM SERPL-MCNC: 1.5 MG/DL — LOW (ref 1.6–2.6)
MCHC RBC-ENTMCNC: 31.2 PG — SIGNIFICANT CHANGE UP (ref 27–34)
MCHC RBC-ENTMCNC: 35.6 GM/DL — SIGNIFICANT CHANGE UP (ref 32–36)
MCV RBC AUTO: 87.7 FL — SIGNIFICANT CHANGE UP (ref 80–100)
NRBC # BLD: 0 /100 WBCS — SIGNIFICANT CHANGE UP (ref 0–0)
PHOSPHATE SERPL-MCNC: 4.4 MG/DL — SIGNIFICANT CHANGE UP (ref 2.5–4.5)
PLATELET # BLD AUTO: 19 K/UL — CRITICAL LOW (ref 150–400)
POTASSIUM SERPL-MCNC: 3.7 MMOL/L — SIGNIFICANT CHANGE UP (ref 3.5–5.3)
POTASSIUM SERPL-SCNC: 3.7 MMOL/L — SIGNIFICANT CHANGE UP (ref 3.5–5.3)
PROT SERPL-MCNC: 6.6 G/DL — SIGNIFICANT CHANGE UP (ref 6–8.3)
PROTHROM AB SERPL-ACNC: 13.3 SEC — SIGNIFICANT CHANGE UP (ref 10.5–13.4)
RBC # BLD: 2.53 M/UL — LOW (ref 4.2–5.8)
RBC # FLD: 13.1 % — SIGNIFICANT CHANGE UP (ref 10.3–14.5)
SODIUM SERPL-SCNC: 144 MMOL/L — SIGNIFICANT CHANGE UP (ref 135–145)
TACROLIMUS SERPL-MCNC: 1.9 NG/ML — SIGNIFICANT CHANGE UP
WBC # BLD: 0.02 K/UL — CRITICAL LOW (ref 3.8–10.5)
WBC # FLD AUTO: 0.02 K/UL — CRITICAL LOW (ref 3.8–10.5)

## 2023-06-22 PROCEDURE — 70487 CT MAXILLOFACIAL W/DYE: CPT | Mod: 26

## 2023-06-22 PROCEDURE — 99222 1ST HOSP IP/OBS MODERATE 55: CPT

## 2023-06-22 PROCEDURE — 99233 SBSQ HOSP IP/OBS HIGH 50: CPT

## 2023-06-22 RX ORDER — MAGNESIUM SULFATE 500 MG/ML
2 VIAL (ML) INJECTION ONCE
Refills: 0 | Status: COMPLETED | OUTPATIENT
Start: 2023-06-22 | End: 2023-06-22

## 2023-06-22 RX ORDER — ONDANSETRON 8 MG/1
8 TABLET, FILM COATED ORAL ONCE
Refills: 0 | Status: COMPLETED | OUTPATIENT
Start: 2023-06-22 | End: 2023-06-22

## 2023-06-22 RX ORDER — SODIUM CHLORIDE 0.65 %
1 AEROSOL, SPRAY (ML) NASAL
Refills: 0 | Status: DISCONTINUED | OUTPATIENT
Start: 2023-06-22 | End: 2023-06-30

## 2023-06-22 RX ORDER — FLUTICASONE PROPIONATE 50 MCG
1 SPRAY, SUSPENSION NASAL
Refills: 0 | Status: DISCONTINUED | OUTPATIENT
Start: 2023-06-25 | End: 2023-06-30

## 2023-06-22 RX ORDER — OXYMETAZOLINE HYDROCHLORIDE 0.5 MG/ML
2 SPRAY NASAL
Refills: 0 | Status: COMPLETED | OUTPATIENT
Start: 2023-06-22 | End: 2023-06-25

## 2023-06-22 RX ORDER — HYDROCORTISONE 1 %
1 OINTMENT (GRAM) TOPICAL
Refills: 0 | Status: DISCONTINUED | OUTPATIENT
Start: 2023-06-22 | End: 2023-06-22

## 2023-06-22 RX ORDER — TACROLIMUS 5 MG/1
4 CAPSULE ORAL
Refills: 0 | Status: DISCONTINUED | OUTPATIENT
Start: 2023-06-22 | End: 2023-06-29

## 2023-06-22 RX ADMIN — Medication 10 MILLILITER(S): at 16:38

## 2023-06-22 RX ADMIN — Medication 5 MILLILITER(S): at 16:38

## 2023-06-22 RX ADMIN — Medication 650 MILLIGRAM(S): at 23:45

## 2023-06-22 RX ADMIN — LIDOCAINE AND PRILOCAINE CREAM 1 APPLICATION(S): 25; 25 CREAM TOPICAL at 13:52

## 2023-06-22 RX ADMIN — Medication 10 MILLIGRAM(S): at 03:30

## 2023-06-22 RX ADMIN — TACROLIMUS 4 MILLIGRAM(S): 5 CAPSULE ORAL at 19:19

## 2023-06-22 RX ADMIN — Medication 1 LOZENGE: at 16:38

## 2023-06-22 RX ADMIN — PIPERACILLIN AND TAZOBACTAM 25 GRAM(S): 4; .5 INJECTION, POWDER, LYOPHILIZED, FOR SOLUTION INTRAVENOUS at 13:51

## 2023-06-22 RX ADMIN — PIPERACILLIN AND TAZOBACTAM 25 GRAM(S): 4; .5 INJECTION, POWDER, LYOPHILIZED, FOR SOLUTION INTRAVENOUS at 05:14

## 2023-06-22 RX ADMIN — Medication 25 GRAM(S): at 09:56

## 2023-06-22 RX ADMIN — CHLORHEXIDINE GLUCONATE 1 APPLICATION(S): 213 SOLUTION TOPICAL at 07:50

## 2023-06-22 RX ADMIN — URSODIOL 300 MILLIGRAM(S): 250 TABLET, FILM COATED ORAL at 18:20

## 2023-06-22 RX ADMIN — SODIUM CHLORIDE 20 MILLILITER(S): 9 INJECTION INTRAMUSCULAR; INTRAVENOUS; SUBCUTANEOUS at 21:08

## 2023-06-22 RX ADMIN — FAMOTIDINE 20 MILLIGRAM(S): 10 INJECTION INTRAVENOUS at 05:14

## 2023-06-22 RX ADMIN — Medication 5 MILLILITER(S): at 07:45

## 2023-06-22 RX ADMIN — FAMOTIDINE 20 MILLIGRAM(S): 10 INJECTION INTRAVENOUS at 17:32

## 2023-06-22 RX ADMIN — Medication 600 MILLIGRAM(S): at 17:32

## 2023-06-22 RX ADMIN — MYCOPHENOLATE MOFETIL 1000 MILLIGRAM(S): 250 CAPSULE ORAL at 13:09

## 2023-06-22 RX ADMIN — Medication 400 MILLIGRAM(S): at 05:13

## 2023-06-22 RX ADMIN — OXYCODONE HYDROCHLORIDE 5 MILLIGRAM(S): 5 TABLET ORAL at 04:45

## 2023-06-22 RX ADMIN — OXYCODONE HYDROCHLORIDE 5 MILLIGRAM(S): 5 TABLET ORAL at 05:30

## 2023-06-22 RX ADMIN — TACROLIMUS 3 MILLIGRAM(S): 5 CAPSULE ORAL at 05:15

## 2023-06-22 RX ADMIN — MYCOPHENOLATE MOFETIL 1000 MILLIGRAM(S): 250 CAPSULE ORAL at 05:14

## 2023-06-22 RX ADMIN — Medication 5 MILLILITER(S): at 23:09

## 2023-06-22 RX ADMIN — Medication 5 MILLILITER(S): at 19:37

## 2023-06-22 RX ADMIN — Medication 400 MILLIGRAM(S): at 21:08

## 2023-06-22 RX ADMIN — Medication 10 MILLILITER(S): at 07:45

## 2023-06-22 RX ADMIN — Medication 1 LOZENGE: at 23:09

## 2023-06-22 RX ADMIN — OXYMETAZOLINE HYDROCHLORIDE 2 SPRAY(S): 0.5 SPRAY NASAL at 18:24

## 2023-06-22 RX ADMIN — OXYCODONE HYDROCHLORIDE 5 MILLIGRAM(S): 5 TABLET ORAL at 18:29

## 2023-06-22 RX ADMIN — Medication 1 APPLICATION(S): at 05:14

## 2023-06-22 RX ADMIN — Medication 10 MILLILITER(S): at 19:39

## 2023-06-22 RX ADMIN — Medication 10 MILLIGRAM(S): at 21:09

## 2023-06-22 RX ADMIN — Medication 400 MILLIGRAM(S): at 13:09

## 2023-06-22 RX ADMIN — ONDANSETRON 8 MILLIGRAM(S): 8 TABLET, FILM COATED ORAL at 12:32

## 2023-06-22 RX ADMIN — PIPERACILLIN AND TAZOBACTAM 25 GRAM(S): 4; .5 INJECTION, POWDER, LYOPHILIZED, FOR SOLUTION INTRAVENOUS at 21:08

## 2023-06-22 RX ADMIN — Medication 10 MILLILITER(S): at 23:09

## 2023-06-22 RX ADMIN — Medication 1 SPRAY(S): at 18:24

## 2023-06-22 RX ADMIN — Medication 1 LOZENGE: at 07:45

## 2023-06-22 RX ADMIN — URSODIOL 300 MILLIGRAM(S): 250 TABLET, FILM COATED ORAL at 05:14

## 2023-06-22 RX ADMIN — OXYCODONE HYDROCHLORIDE 5 MILLIGRAM(S): 5 TABLET ORAL at 17:49

## 2023-06-22 RX ADMIN — MYCOPHENOLATE MOFETIL 1000 MILLIGRAM(S): 250 CAPSULE ORAL at 21:08

## 2023-06-22 RX ADMIN — SODIUM CHLORIDE 20 MILLILITER(S): 9 INJECTION INTRAMUSCULAR; INTRAVENOUS; SUBCUTANEOUS at 19:37

## 2023-06-22 RX ADMIN — Medication 1 LOZENGE: at 19:37

## 2023-06-22 RX ADMIN — HEPARIN SODIUM 4.24 UNIT(S)/HR: 5000 INJECTION INTRAVENOUS; SUBCUTANEOUS at 19:38

## 2023-06-22 NOTE — PROVIDER CONTACT NOTE (MEDICATION) - SITUATION
Pt. complaining of sinus pressure
pt. c/o pain, discomfort left side of face and left ey, and it feels like his left eye is going to pop uou

## 2023-06-22 NOTE — CHART NOTE - NSCHARTNOTESELECT_GEN_ALL_CORE
Eye pain/Event Note
Nutrition Services
BMTU cells/Event Note
Event Note
Event Note
Nutrition Services

## 2023-06-22 NOTE — PROGRESS NOTE ADULT - NS ATTEND AMEND GEN_ALL_CORE FT
1. Admit to BMTU Today is day + 14  2. TLC placement in IR   3. Day -6 - day + 5 - antiemetics   4. Day - 6 start CTX hydration - 1/2NS @ 200ml /hr    5. Strict I&O, daily weights, prn diuresis   6. Day -6 - day -2 - Fludarabine 30mg/m2 IV  7. Day -6 - day - 5 - CTX 14.5mg/kg/day IV. CTX to start after urine SG < 1.010. Mesna  12mg / kg - hemorrhagic cystitis ppx   8. Day -2 - IVIG 0.4 g/kg (ideal body weight) every 3 weeks. Premedication with Tylenol and benadryl  9. Day -1 -  cGy x 1 dose   10. Day - 1 - at 2200 begin transplant hydration : 0.45Ns + 1 amp (50mEq) sodium bicarbonate at 150ml /hr; continue transplant hydration for 24 hours post infusion   11. Day 0 – HPC transplant   12. Day + 2 at 2200 - begin CTX hydration (0.45NS + 10 mEq KCl/ @150ml /m2  continue 24 hours post last dose of CTX   13. Day + 3 - + 4 - CTX 50mg/kg/day IV. Begin CTX when urine SG < 1.010. EKG daily. Mesna for hemorraghic cystitis ppx.   14. Day + 5 - start Zarxio,  MMF and Tacrolimus. Check Tacrolimus levels on Monday and Thursday.   15. Anxiolytics, antinausea, antidiarrhea medications as needed   16. Lasix PRN while being aggressively hydrated to avoid VOD   17. Nutritional support, pain management  Need for prophylactic measures:  1. VOD prophylaxis - low dose heparin gtt (dosed at 100 units / kg / day), glutamine supplementation, Actigall BID   2. PCP prophylaxis - Bactrim DS through day -2    3. Antiviral prophylaxis - Continue Acyclovir   4. Antifungal prophylaxis- Diflucan 400 mg po daily.  5.. GI prophylaxis - Protonix po QD   6. Antibacterial prophylaxis -  ANC < 500, started Cipro 500mg po BID. If becomes febrile, pan cx, CXR and change Cipro to Cefepime 2g IV q 8 hours. Continue until count recovery..slight rise in wbc noted after infusion of marrow  7. Aggressive mouth care and skin care as per protocol  8. GVHD prophylaxis- high dose CTX; MMF and Tacrolimus.  9. transfusion and electrolyte support    Patient with hemoglobin drop of ~2  on 6/4, likely due to chemotherapy however will repeat CBC tonight to establish stability, likely will require transfusion.  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  6/11 Febrile to 100.6F. Cipro switched to cefepime. Infectious work up ordered.  6/12 day 4 ctx today...given toci for haplo storm..some loose bowel, cdiff neg  6/13 mmf, tacro and zarxio.  6/16 overall doing well..on tacro 2 mg..repeat on monday..goal 5 to 10

## 2023-06-22 NOTE — PROGRESS NOTE ADULT - SUBJECTIVE AND OBJECTIVE BOX
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 () with Flu / Cy / TBI prep regimen for treatment of severe aplastic anemia    S: Patient seen and examined with HPC Transplant Team:   + eye pain/pressure overnight  + general fatigue   + sinus pressure/pain, nasal congestion, post nasal drip induced cough  + decreased appetite    O: Vitals:   Vital Signs Last 24 Hrs  T(C): 37 (2023 05:00), Max: 37 (2023 23:09)  T(F): 98.6 (2023 05:00), Max: 98.6 (2023 23:09)  HR: 114 (2023 05:00) (79 - 119)  BP: 128/80 (2023 05:00) (96/57 - 157/89)  RR: 21 (2023 05:00) (17 - 21)  SpO2: 98% (2023 05:00) (95% - 99%)    Parameters below as of 2023 05:00  Patient On (Oxygen Delivery Method): room air    Admit weight: 102.8kg   Daily Weight in k.5 (2023 10:00)  Daily Weight in k (2023 10:35)    Intake / Output:    @ 07:01  -   @ 07:00  --------------------------------------------------------  IN: 2599.9 mL / OUT: 1940 mL / NET: 659.9 mL      PE:   Oropharynx: no erythema or ulcers  Oral Mucositis: (-)                                                        Grade: -  CVS: RRR, +S1,S2  Lungs: CTA B/L  Abdomen: Soft, NT, ND, +BS  Extremities: No edema in BLE  Gastric Mucositis: (-)                                                 Grade: -  Intestinal Mucositis:   (-)                                           Grade: -  Skin: no rash   TLC: CDI  Neuro: Alert, O x 3  Pain: Denies    Labs:                        Cultures:   Culture - Blood (06.10.23 @ 21:45)    Specimen Source: .Blood Blood-Catheter   Culture Results:   No Growth Final    Culture - Urine (06.10.23 @ 22:18)    Specimen Source: Clean Catch Clean Catch (Midstream)   Culture Results:   No growth    Radiology:   Xray Chest 1 View- PORTABLE-Urgent (Xray Chest 1 View- PORTABLE-Urgent .) (06.10.23 @ 22:38) >  Clear lungs.    Meds:   Antimicrobials:   acyclovir   Oral Tab/Cap 400 milliGRAM(s) Oral every 8 hours  clotrimazole Lozenge 1 Lozenge Oral five times a day  fluconAZOLE   Tablet 400 milliGRAM(s) Oral daily  letermovir 480 milliGRAM(s) Oral daily  piperacillin/tazobactam IVPB.. 4.5 Gram(s) IV Intermittent every 8 hours    Heme / Onc:   heparin  Infusion 424 Unit(s)/Hr IV Continuous <Continuous>      GI:  famotidine    Tablet 20 milliGRAM(s) Oral two times a day  loperamide 2 milliGRAM(s) Oral every 4 hours PRN  senna 1 Tablet(s) Oral at bedtime PRN  sodium bicarbonate Mouth Rinse 10 milliLiter(s) Swish and Spit five times a day  ursodiol Capsule 300 milliGRAM(s) Oral two times a day    Immunologic:   filgrastim-sndz (ZARXIO) Injectable (Chemo) 480 MICROGram(s) SubCutaneous every 24 hours  mycophenolate mofetil 1000 milliGRAM(s) Oral three times a day  tacrolimus 3 milliGRAM(s) Oral <User Schedule>      Other medications:   acetaminophen     Tablet .. 650 milliGRAM(s) Oral every 6 hours  Biotene Dry Mouth Oral Rinse 5 milliLiter(s) Swish and Spit five times a day  Chemotherapy Worklist Order      chlorhexidine 4% Liquid 1 Application(s) Topical <User Schedule>  folic acid 1 milliGRAM(s) Oral daily  hydrocortisone 1% Cream 1 Application(s) Topical every 12 hours  hydrocortisone sodium succinate Injectable 50 milliGRAM(s) IV Push every 24 hours  Infuse HPC Product      lidocaine/prilocaine Cream (Chemo) 1 Application(s) Topical daily  loratadine 10 milliGRAM(s) Oral daily  multivitamin 1 Tablet(s) Oral daily  sodium bicarbonate  Infusion (Chemo) 0.074 mEq/kG/Hr IV Continuous <Continuous>  sodium chloride 0.9% 1000 milliLiter(s) IV Continuous <Continuous>  sodium chloride 0.9%. 1000 milliLiter(s) IV Continuous <Continuous>  sodium chloride 0.9%. (Chemo) 1000 milliLiter(s) IV Continuous <Continuous>      PRN:   acetaminophen     Tablet .. 650 milliGRAM(s) Oral every 6 hours PRN  guaiFENesin  milliGRAM(s) Oral every 12 hours PRN  loperamide 2 milliGRAM(s) Oral every 4 hours PRN  oxyCODONE    IR 5 milliGRAM(s) Oral every 4 hours PRN  prochlorperazine   Injectable 10 milliGRAM(s) IV Push every 6 hours PRN  pseudoephedrine 30 milliGRAM(s) Oral every 6 hours PRN  senna 1 Tablet(s) Oral at bedtime PRN  sodium chloride 0.9% lock flush 10 milliLiter(s) IV Push every 1 hour PRN      A/P: 37 year old male with severe aplastic anemia with multiple lines of treatment admitted for a haplo-identical bone marrow transplant with  Flu / Cy / TBI preparative regimen   Day + 14  start CTX hydration - 1/2NS @ 200ml /hr    Strict I&O, daily weights, prn diuresis   Day -6 - day -2 - Fludarabine 30mg/m2 IV  Day -6 - day - 5 - CTX 14.5mg/kg/day IV. CTX to start after urine SG < 1.010. Mesna  12mg / kg - hemorrhagic cystitis ppx   Day -2 - IVIG 0.4 g/kg (ideal body weight) every 3 weeks. Premedication with Tylenol and benadryl   Day -1 -  cGy x 1 dose   Day - 1 - at 2200 begin transplant hydration : 0.45Ns + 1 amp (50mEq) sodium bicarbonate at 150ml /hr; continue transplant hydration for 24 hours post infusion    Day + 2 at 2200 - begin CTX hydration (0.45NS + 10 mEq KCl/ @150ml /m2  continue 24 hours post last dose of CTX   Day + 3 - + 4 - CTX 50mg/kg/day IV. Begin CTX when urine SG < 1.010. EKG daily. Mesna for hemorraghic cystitis ppx.   Day + 5 - start Zarxio,  MMF and Tacrolimus. Check Tacrolimus levels on Monday and Thursday.   when ANC < 500, start Cipro 500mg po BID. If becomes febrile, pan cx, CXR and change Cipro to Cefepime 2g IV q 8 hours. Continue until count recovery  - Fludarabine , intermittent nausea, continue antiemetics   - Fludarabine , IVIG today    TBI today  - HPC transplant today. Continue transplant hydration for 24 hours post infusion of bone marrow   - monitor for CRS / haplo storm - if T >/= 102.5 on days +1 or +2, would dose tocilizumab 8mg / kg IVPB x 1    C.Diff negative, started imodium  - post transplant CTX 2.2 - continue CTX hydration for 24 hours post infusion of last dose , Tocilizumab x1 dose O/N for temp of 39.1.    eye pain overnight, no visual acuity deficits. Opthalmology evaluating - appreciated.    1. Infectious Disease:   acyclovir   Oral Tab/Cap 400 milliGRAM(s) Oral every 8 hours  clotrimazole Lozenge 1 Lozenge Oral five times a day  fluconAZOLE   Tablet 400 milliGRAM(s) Oral daily  piperacillin/tazobactam IVPB.. 4.5 Gram(s) IV Intermittent every 8 hours    2. VOD Prophylaxis: Actigall, Glutamine, Heparin (dosed at 100 units / kg / day)     3. GI Prophylaxis:    famotidine    Tablet 20 milliGRAM(s) Oral two times a day    4. Mouthcare - NS / NaHCO3 rinses, Mycelex, Biotene; Skin care     5. GVHD prophylaxis   TBI day -1   CTX days +3, +4   mycophenolate mofetil 1000 milliGRAM(s) Oral three times a day  tacrolimus 3 milliGRAM(s) Oral <User Schedule>    6. Transfuse & replete electrolytes prn   1 unit PRBC   1 bag platelets     7. IV hydration, daily weights, strict I&O, prn diuresis     8. PO intake as tolerated, nutrition follow up as needed, MVI, folic acid     9. Antiemetics, anti-diarrhea medications:   loperamide 2 milliGRAM(s) Oral every 4 hours PRN  prochlorperazine   Injectable 10 milliGRAM(s) IV Push every 6 hours PRN    10. OOB as tolerated, physical therapy consult if needed     11. Monitor coags / fibrinogen 2x week, vitamin K as needed     12. Monitor closely for clinical changes, monitor for fevers     13. Emotional support provided, plan of care discussed and questions addressed     14. Patient education done regarding plan of care, restrictions and discharge planning     15. Continue regular social work input     I have written the above note for Dr. Lima who performed service with me in the room.   Marcellus Waldron PA-C ([phone])    I have seen and examined patient with PA,  I agree with above note as scribed.                  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 () with Flu / Cy / TBI prep regimen for treatment of severe aplastic anemia    S: Patient seen and examined with HPC Transplant Team:   + eye pain/pressure overnight  + general fatigue   + sinus pressure/pain, nasal congestion, post nasal drip induced cough  + decreased appetite    O: Vitals:   Vital Signs Last 24 Hrs  T(C): 37 (2023 05:00), Max: 37 (2023 23:09)  T(F): 98.6 (2023 05:00), Max: 98.6 (2023 23:09)  HR: 114 (2023 05:00) (79 - 119)  BP: 128/80 (2023 05:00) (96/57 - 157/89)  RR: 21 (2023 05:00) (17 - 21)  SpO2: 98% (2023 05:00) (95% - 99%)    Parameters below as of 2023 05:00  Patient On (Oxygen Delivery Method): room air    Admit weight: 102.8kg   Daily Weight in k.5 (2023 10:00)  Daily Weight in k (2023 10:35)    Intake / Output:   - @ 07:01  -  - @ 07:00  --------------------------------------------------------  IN: 2599.9 mL / OUT: 1940 mL / NET: 659.9 mL      PE:   Oropharynx: no erythema or ulcers  Oral Mucositis: (-)                                                        Grade: -  CVS: RRR, +S1,S2  Lungs: CTA B/L  Abdomen: Soft, NT, ND, +BS  Extremities: No edema in BLE  Gastric Mucositis: (-)                                                 Grade: -  Intestinal Mucositis:   (-)                                           Grade: -  Skin: no rash   TLC: CDI  Neuro: Alert, O x 3  Pain: Denies    Labs:                       7.9    0.02  )-----------( 19       ( 2023 07:20 )             22.2     2023 07:21    144    |  104    |  11     ----------------------------<  100    3.7     |  24     |  0.67     Ca    8.8        2023 07:21  Phos  4.4       2023 07:21  Mg     1.5       2023 07:21    TPro  6.6    /  Alb  4.3    /  TBili  0.6    /  DBili  x      /  AST  14     /  ALT  32     /  AlkPhos  84     2023 07:21    PT/INR - ( 2023 07:24 )   PT: 13.3 sec;   INR: 1.15 ratio    PTT - ( 2023 07:24 )  PTT:28.7 sec    LIVER FUNCTIONS - ( 2023 07:21 )  Alb: 4.3 g/dL / Pro: 6.6 g/dL / ALK PHOS: 84 U/L / ALT: 32 U/L / AST: 14 U/L / GGT: x           Urinalysis Basic - ( 2023 07:21 )    Color: x / Appearance: x / SG: x / pH: x  Gluc: 100 mg/dL / Ketone: x  / Bili: x / Urobili: x   Blood: x / Protein: x / Nitrite: x   Leuk Esterase: x / RBC: x / WBC x   Sq Epi: x / Non Sq Epi: x / Bacteria: x    Cultures:   Culture - Blood (06.10.23 @ 21:45)    Specimen Source: .Blood Blood-Catheter   Culture Results:   No Growth Final    Culture - Urine (06.10.23 @ 22:18)    Specimen Source: Clean Catch Clean Catch (Midstream)   Culture Results:   No growth    Radiology:   Xray Chest 1 View- PORTABLE-Urgent (Xray Chest 1 View- PORTABLE-Urgent .) (06.10.23 @ 22:38) >  Clear lungs.    Meds:   Antimicrobials:   acyclovir   Oral Tab/Cap 400 milliGRAM(s) Oral every 8 hours  clotrimazole Lozenge 1 Lozenge Oral five times a day  fluconAZOLE   Tablet 400 milliGRAM(s) Oral daily  letermovir 480 milliGRAM(s) Oral daily  piperacillin/tazobactam IVPB.. 4.5 Gram(s) IV Intermittent every 8 hours    Heme / Onc:   heparin  Infusion 424 Unit(s)/Hr IV Continuous <Continuous>      GI:  famotidine    Tablet 20 milliGRAM(s) Oral two times a day  loperamide 2 milliGRAM(s) Oral every 4 hours PRN  senna 1 Tablet(s) Oral at bedtime PRN  sodium bicarbonate Mouth Rinse 10 milliLiter(s) Swish and Spit five times a day  ursodiol Capsule 300 milliGRAM(s) Oral two times a day    Immunologic:   filgrastim-sndz (ZARXIO) Injectable (Chemo) 480 MICROGram(s) SubCutaneous every 24 hours  mycophenolate mofetil 1000 milliGRAM(s) Oral three times a day  tacrolimus 3 milliGRAM(s) Oral <User Schedule>      Other medications:   acetaminophen     Tablet .. 650 milliGRAM(s) Oral every 6 hours  Biotene Dry Mouth Oral Rinse 5 milliLiter(s) Swish and Spit five times a day  Chemotherapy Worklist Order      chlorhexidine 4% Liquid 1 Application(s) Topical <User Schedule>  folic acid 1 milliGRAM(s) Oral daily  hydrocortisone 1% Cream 1 Application(s) Topical every 12 hours  hydrocortisone sodium succinate Injectable 50 milliGRAM(s) IV Push every 24 hours  Infuse HPC Product      lidocaine/prilocaine Cream (Chemo) 1 Application(s) Topical daily  loratadine 10 milliGRAM(s) Oral daily  multivitamin 1 Tablet(s) Oral daily  sodium bicarbonate  Infusion (Chemo) 0.074 mEq/kG/Hr IV Continuous <Continuous>  sodium chloride 0.9% 1000 milliLiter(s) IV Continuous <Continuous>  sodium chloride 0.9%. 1000 milliLiter(s) IV Continuous <Continuous>  sodium chloride 0.9%. (Chemo) 1000 milliLiter(s) IV Continuous <Continuous>      PRN:   acetaminophen     Tablet .. 650 milliGRAM(s) Oral every 6 hours PRN  guaiFENesin  milliGRAM(s) Oral every 12 hours PRN  loperamide 2 milliGRAM(s) Oral every 4 hours PRN  oxyCODONE    IR 5 milliGRAM(s) Oral every 4 hours PRN  prochlorperazine   Injectable 10 milliGRAM(s) IV Push every 6 hours PRN  pseudoephedrine 30 milliGRAM(s) Oral every 6 hours PRN  senna 1 Tablet(s) Oral at bedtime PRN  sodium chloride 0.9% lock flush 10 milliLiter(s) IV Push every 1 hour PRN      A/P: 37 year old male with severe aplastic anemia with multiple lines of treatment admitted for a haplo-identical bone marrow transplant with  Flu / Cy / TBI preparative regimen   Day + 14  start CTX hydration - 1/2NS @ 200ml /hr    Strict I&O, daily weights, prn diuresis   Day -6 - day -2 - Fludarabine 30mg/m2 IV  Day -6 - day - 5 - CTX 14.5mg/kg/day IV. CTX to start after urine SG < 1.010. Mesna  12mg / kg - hemorrhagic cystitis ppx   Day -2 - IVIG 0.4 g/kg (ideal body weight) every 3 weeks. Premedication with Tylenol and benadryl   Day -1 -  cGy x 1 dose   Day - 1 - at 2200 begin transplant hydration : 0.45Ns + 1 amp (50mEq) sodium bicarbonate at 150ml /hr; continue transplant hydration for 24 hours post infusion    Day + 2 at 2200 - begin CTX hydration (0.45NS + 10 mEq KCl/ @150ml /m2  continue 24 hours post last dose of CTX   Day + 3 - + 4 - CTX 50mg/kg/day IV. Begin CTX when urine SG < 1.010. EKG daily. Mesna for hemorraghic cystitis ppx.   Day + 5 - start Zarxio,  MMF and Tacrolimus. Check Tacrolimus levels on Monday and Thursday.   when ANC < 500, start Cipro 500mg po BID. If becomes febrile, pan cx, CXR and change Cipro to Cefepime 2g IV q 8 hours. Continue until count recovery  - Fludarabine , intermittent nausea, continue antiemetics   - Fludarabine , IVIG  7 TBI   - HPC transplant today. Continue transplant hydration for 24 hours post infusion of bone marrow   - monitor for CRS / haplo storm - if T >/= 102.5 on days +1 or +2, would dose tocilizumab 8mg / kg IVPB x 1    C.Diff negative, started imodium  - post transplant CTX 2.2 - continue CTX hydration for 24 hours post infusion of last dose , Tocilizumab x1 dose O/N for temp of 39.1.   6/22 eye pain overnight, no visual acuity deficits. Opthalmology evaluating - appreciated - optic disc is sharp/pink, overall suggesting against a diagnosis of optic neuritis.     1. Infectious Disease:   acyclovir   Oral Tab/Cap 400 milliGRAM(s) Oral every 8 hours  clotrimazole Lozenge 1 Lozenge Oral five times a day  fluconAZOLE   Tablet 400 milliGRAM(s) Oral daily  piperacillin/tazobactam IVPB.. 4.5 Gram(s) IV Intermittent every 8 hours    2. VOD Prophylaxis: Actigall, Glutamine, Heparin (dosed at 100 units / kg / day)     3. GI Prophylaxis:    famotidine    Tablet 20 milliGRAM(s) Oral two times a day    4. Mouthcare - NS / NaHCO3 rinses, Mycelex, Biotene; Skin care     5. GVHD prophylaxis   TBI day -1   CTX days +3, +4   mycophenolate mofetil 1000 milliGRAM(s) Oral three times a day  tacrolimus 3 milliGRAM(s) Oral <User Schedule>    6. Transfuse & replete electrolytes prn   1 unit PRBC   1 bag platelets     7. IV hydration, daily weights, strict I&O, prn diuresis     8. PO intake as tolerated, nutrition follow up as needed, MVI, folic acid     9. Antiemetics, anti-diarrhea medications:   loperamide 2 milliGRAM(s) Oral every 4 hours PRN  prochlorperazine   Injectable 10 milliGRAM(s) IV Push every 6 hours PRN    10. OOB as tolerated, physical therapy consult if needed     11. Monitor coags / fibrinogen 2x week, vitamin K as needed     12. Monitor closely for clinical changes, monitor for fevers     13. Emotional support provided, plan of care discussed and questions addressed     14. Patient education done regarding plan of care, restrictions and discharge planning     15. Continue regular social work input     I have written the above note for Dr. Lima who performed service with me in the room.   Marcellus Waldron PA-C ([phone])    I have seen and examined patient with PA,  I agree with above note as scribed.                  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 () with Flu / Cy / TBI prep regimen for treatment of severe aplastic anemia    S: Patient seen and examined with HPC Transplant Team:   + eye pain/pressure overnight  + general fatigue   + sinus pressure/pain, nasal congestion, post nasal drip induced cough  + decreased appetite    O: Vitals:   Vital Signs Last 24 Hrs  T(C): 37 (2023 05:00), Max: 37 (2023 23:09)  T(F): 98.6 (2023 05:00), Max: 98.6 (2023 23:09)  HR: 114 (2023 05:00) (79 - 119)  BP: 128/80 (2023 05:00) (96/57 - 157/89)  RR: 21 (2023 05:00) (17 - 21)  SpO2: 98% (2023 05:00) (95% - 99%)    Parameters below as of 2023 05:00  Patient On (Oxygen Delivery Method): room air    Admit weight: 102.8kg   Daily Weight in k.5 (2023 10:00)  Daily Weight in k.6 (2023 09:15)      Intake / Output:    @ 07:01  -  - @ 07:00  --------------------------------------------------------  IN: 2599.9 mL / OUT: 1940 mL / NET: 659.9 mL      PE:   Oropharynx: no erythema or ulcers  Oral Mucositis: (-)                                                        Grade: -  CVS: RRR, +S1,S2  Lungs: CTA B/L  Abdomen: Soft, NT, ND, +BS  Extremities: No edema in BLE  Gastric Mucositis: (-)                                                 Grade: -  Intestinal Mucositis:   (-)                                           Grade: -  Skin: no rash   TLC: CDI  Neuro: Alert, O x 3  Pain: Denies    Labs:                       7.9    0.02  )-----------( 19       ( 2023 07:20 )             22.2     2023 07:21    144    |  104    |  11     ----------------------------<  100    3.7     |  24     |  0.67     Ca    8.8        2023 07:21  Phos  4.4       2023 07:21  Mg     1.5       2023 07:21    TPro  6.6    /  Alb  4.3    /  TBili  0.6    /  DBili  x      /  AST  14     /  ALT  32     /  AlkPhos  84     2023 07:21    PT/INR - ( 2023 07:24 )   PT: 13.3 sec;   INR: 1.15 ratio    PTT - ( 2023 07:24 )  PTT:28.7 sec    LIVER FUNCTIONS - ( 2023 07:21 )  Alb: 4.3 g/dL / Pro: 6.6 g/dL / ALK PHOS: 84 U/L / ALT: 32 U/L / AST: 14 U/L / GGT: x           Urinalysis Basic - ( 2023 07:21 )    Color: x / Appearance: x / SG: x / pH: x  Gluc: 100 mg/dL / Ketone: x  / Bili: x / Urobili: x   Blood: x / Protein: x / Nitrite: x   Leuk Esterase: x / RBC: x / WBC x   Sq Epi: x / Non Sq Epi: x / Bacteria: x    Cultures:   Culture - Blood (06.10.23 @ 21:45)    Specimen Source: .Blood Blood-Catheter   Culture Results:   No Growth Final    Culture - Urine (06.10.23 @ 22:18)    Specimen Source: Clean Catch Clean Catch (Midstream)   Culture Results:   No growth    Radiology:   Xray Chest 1 View- PORTABLE-Urgent (Xray Chest 1 View- PORTABLE-Urgent .) (06.10.23 @ 22:38) >  Clear lungs.    Meds:   Antimicrobials:   acyclovir   Oral Tab/Cap 400 milliGRAM(s) Oral every 8 hours  clotrimazole Lozenge 1 Lozenge Oral five times a day  fluconAZOLE   Tablet 400 milliGRAM(s) Oral daily  letermovir 480 milliGRAM(s) Oral daily  piperacillin/tazobactam IVPB.. 4.5 Gram(s) IV Intermittent every 8 hours    Heme / Onc:   heparin  Infusion 424 Unit(s)/Hr IV Continuous <Continuous>      GI:  famotidine    Tablet 20 milliGRAM(s) Oral two times a day  loperamide 2 milliGRAM(s) Oral every 4 hours PRN  senna 1 Tablet(s) Oral at bedtime PRN  sodium bicarbonate Mouth Rinse 10 milliLiter(s) Swish and Spit five times a day  ursodiol Capsule 300 milliGRAM(s) Oral two times a day    Immunologic:   filgrastim-sndz (ZARXIO) Injectable (Chemo) 480 MICROGram(s) SubCutaneous every 24 hours  mycophenolate mofetil 1000 milliGRAM(s) Oral three times a day  tacrolimus 3 milliGRAM(s) Oral <User Schedule>      Other medications:   acetaminophen     Tablet .. 650 milliGRAM(s) Oral every 6 hours  Biotene Dry Mouth Oral Rinse 5 milliLiter(s) Swish and Spit five times a day  Chemotherapy Worklist Order      chlorhexidine 4% Liquid 1 Application(s) Topical <User Schedule>  folic acid 1 milliGRAM(s) Oral daily  hydrocortisone 1% Cream 1 Application(s) Topical every 12 hours  hydrocortisone sodium succinate Injectable 50 milliGRAM(s) IV Push every 24 hours  Infuse HPC Product      lidocaine/prilocaine Cream (Chemo) 1 Application(s) Topical daily  loratadine 10 milliGRAM(s) Oral daily  multivitamin 1 Tablet(s) Oral daily  sodium bicarbonate  Infusion (Chemo) 0.074 mEq/kG/Hr IV Continuous <Continuous>  sodium chloride 0.9% 1000 milliLiter(s) IV Continuous <Continuous>  sodium chloride 0.9%. 1000 milliLiter(s) IV Continuous <Continuous>  sodium chloride 0.9%. (Chemo) 1000 milliLiter(s) IV Continuous <Continuous>      PRN:   acetaminophen     Tablet .. 650 milliGRAM(s) Oral every 6 hours PRN  guaiFENesin  milliGRAM(s) Oral every 12 hours PRN  loperamide 2 milliGRAM(s) Oral every 4 hours PRN  oxyCODONE    IR 5 milliGRAM(s) Oral every 4 hours PRN  prochlorperazine   Injectable 10 milliGRAM(s) IV Push every 6 hours PRN  pseudoephedrine 30 milliGRAM(s) Oral every 6 hours PRN  senna 1 Tablet(s) Oral at bedtime PRN  sodium chloride 0.9% lock flush 10 milliLiter(s) IV Push every 1 hour PRN      A/P: 37 year old male with severe aplastic anemia with multiple lines of treatment admitted for a haplo-identical bone marrow transplant with  Flu / Cy / TBI preparative regimen   Day + 14  start CTX hydration - 1/2NS @ 200ml /hr    Strict I&O, daily weights, prn diuresis   Day -6 - day -2 - Fludarabine 30mg/m2 IV  Day -6 - day - 5 - CTX 14.5mg/kg/day IV. CTX to start after urine SG < 1.010. [place]  12mg / kg - hemorrhagic cystitis ppx   Day -2 - IVIG 0.4 g/kg (ideal body weight) every 3 weeks. Premedication with Tylenol and benadryl   Day -1 -  cGy x 1 dose   Day - 1 - at 2200 begin transplant hydration : 0.45Ns + 1 amp (50mEq) sodium bicarbonate at 150ml /hr; continue transplant hydration for 24 hours post infusion    Day + 2 at 0 - begin CTX hydration (0.45NS + 10 mEq KCl/ @150ml /m2  continue 24 hours post last dose of CTX   Day + 3 - + 4 - CTX 50mg/kg/day IV. Begin CTX when urine SG < 1.010. EKG daily. [place] for hemorraghic cystitis ppx.   Day + 5 - start Zarxio,  MMF and Tacrolimus. Check Tacrolimus levels on Monday and Thursday.   when ANC < 500, start Cipro 500mg po BID. If becomes febrile, pan cx, CXR and change Cipro to Cefepime 2g IV q 8 hours. Continue until count recovery  - Fludarabine , intermittent nausea, continue antiemetics   - Fludarabine , IVIG  7 TBI   - HPC transplant today. Continue transplant hydration for 24 hours post infusion of bone marrow   - monitor for CRS / haplo storm - if T >/= 102.5 on days +1 or +2, would dose tocilizumab 8mg / kg IVPB x 1    C.Diff negative, started imodium  - post transplant CTX 2.2 - continue CTX hydration for 24 hours post infusion of last dose , Tocilizumab x1 dose O/N for temp of 39.1.   6/22 eye pain overnight, no visual acuity deficits. Opthalmology evaluating - appreciated - optic disc is sharp/pink, overall suggesting against a diagnosis of optic neuritis.     1. Infectious Disease:   acyclovir   Oral Tab/Cap 400 milliGRAM(s) Oral every 8 hours  clotrimazole Lozenge 1 Lozenge Oral five times a day  fluconAZOLE   Tablet 400 milliGRAM(s) Oral daily  piperacillin/tazobactam IVPB.. 4.5 Gram(s) IV Intermittent every 8 hours    2. VOD Prophylaxis: Actigall, Glutamine, Heparin (dosed at 100 units / kg / day)     3. GI Prophylaxis:    famotidine    Tablet 20 milliGRAM(s) Oral two times a day    4. Mouthcare - NS / NaHCO3 rinses, Mycelex, Biotene; Skin care     5. GVHD prophylaxis   TBI day -1   CTX days +3, +4   mycophenolate mofetil 1000 milliGRAM(s) Oral three times a day  tacrolimus 3 milliGRAM(s) Oral <User Schedule>    6. Transfuse & replete electrolytes prn   1 unit PRBC   1 bag platelets     7. IV hydration, daily weights, strict I&O, prn diuresis     8. PO intake as tolerated, nutrition follow up as needed, MVI, folic acid     9. Antiemetics, anti-diarrhea medications:   loperamide 2 milliGRAM(s) Oral every 4 hours PRN  prochlorperazine   Injectable 10 milliGRAM(s) IV Push every 6 hours PRN    10. OOB as tolerated, physical therapy consult if needed     11. Monitor coags / fibrinogen 2x week, vitamin K as needed     12. Monitor closely for clinical changes, monitor for fevers     13. Emotional support provided, plan of care discussed and questions addressed     14. Patient education done regarding plan of care, restrictions and discharge planning     15. Continue regular social work input     I have written the above note for Dr. Lima who performed service with me in the room.   Marcellus Waldron PA-C ([phone])    I have seen and examined patient with PA,  I agree with above note as scribed.                  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 () with Flu / Cy / TBI prep regimen for treatment of severe aplastic anemia    S: Patient seen and examined with HPC Transplant Team:   + eye pain/pressure overnight  + general fatigue   + sinus pressure/pain, nasal congestion, post nasal drip induced cough  + decreased appetite    O: Vitals:   Vital Signs Last 24 Hrs  T(C): 37 (2023 05:00), Max: 37 (2023 23:09)  T(F): 98.6 (2023 05:00), Max: 98.6 (2023 23:09)  HR: 114 (2023 05:00) (79 - 119)  BP: 128/80 (2023 05:00) (96/57 - 157/89)  RR: 21 (2023 05:00) (17 - 21)  SpO2: 98% (2023 05:00) (95% - 99%)    Parameters below as of 2023 05:00  Patient On (Oxygen Delivery Method): room air    Admit weight: 102.8kg   Daily Weight in k.5 (2023 10:00)  Daily Weight in k.6 (2023 09:15)      Intake / Output:    @ 07:01  -  - @ 07:00  --------------------------------------------------------  IN: 2599.9 mL / OUT: 1940 mL / NET: 659.9 mL      PE:   Oropharynx: no erythema or ulcers  Oral Mucositis: (-)                                                        Grade: -  CVS: RRR, +S1,S2  Lungs: CTA B/L  Abdomen: Soft, NT, ND, +BS  Extremities: No edema in BLE  Gastric Mucositis: (-)                                                 Grade: -  Intestinal Mucositis:   (-)                                           Grade: -  Skin: no rash   TLC: CDI  Neuro: Alert, O x 3  Pain: Denies    Labs:                       7.9    0.02  )-----------( 19       ( 2023 07:20 )             22.2     2023 07:21    144    |  104    |  11     ----------------------------<  100    3.7     |  24     |  0.67     Ca    8.8        2023 07:21  Phos  4.4       2023 07:21  Mg     1.5       2023 07:21    TPro  6.6    /  Alb  4.3    /  TBili  0.6    /  DBili  x      /  AST  14     /  ALT  32     /  AlkPhos  84     2023 07:21    PT/INR - ( 2023 07:24 )   PT: 13.3 sec;   INR: 1.15 ratio    PTT - ( 2023 07:24 )  PTT:28.7 sec    LIVER FUNCTIONS - ( 2023 07:21 )  Alb: 4.3 g/dL / Pro: 6.6 g/dL / ALK PHOS: 84 U/L / ALT: 32 U/L / AST: 14 U/L / GGT: x           Urinalysis Basic - ( 2023 07:21 )    Color: x / Appearance: x / SG: x / pH: x  Gluc: 100 mg/dL / Ketone: x  / Bili: x / Urobili: x   Blood: x / Protein: x / Nitrite: x   Leuk Esterase: x / RBC: x / WBC x   Sq Epi: x / Non Sq Epi: x / Bacteria: x    Cultures:   Culture - Blood (06.10.23 @ 21:45)    Specimen Source: .Blood Blood-Catheter   Culture Results:   No Growth Final    Culture - Urine (06.10.23 @ 22:18)    Specimen Source: Clean Catch Clean Catch (Midstream)   Culture Results:   No growth    Radiology:   Xray Chest 1 View- PORTABLE-Urgent (Xray Chest 1 View- PORTABLE-Urgent .) (06.10.23 @ 22:38) >  Clear lungs.    Meds:   Antimicrobials:   acyclovir   Oral Tab/Cap 400 milliGRAM(s) Oral every 8 hours  clotrimazole Lozenge 1 Lozenge Oral five times a day  fluconAZOLE   Tablet 400 milliGRAM(s) Oral daily  letermovir 480 milliGRAM(s) Oral daily  piperacillin/tazobactam IVPB.. 4.5 Gram(s) IV Intermittent every 8 hours    Heme / Onc:   heparin  Infusion 424 Unit(s)/Hr IV Continuous <Continuous>      GI:  famotidine    Tablet 20 milliGRAM(s) Oral two times a day  loperamide 2 milliGRAM(s) Oral every 4 hours PRN  senna 1 Tablet(s) Oral at bedtime PRN  sodium bicarbonate Mouth Rinse 10 milliLiter(s) Swish and Spit five times a day  ursodiol Capsule 300 milliGRAM(s) Oral two times a day    Immunologic:   filgrastim-sndz (ZARXIO) Injectable (Chemo) 480 MICROGram(s) SubCutaneous every 24 hours  mycophenolate mofetil 1000 milliGRAM(s) Oral three times a day  tacrolimus 3 milliGRAM(s) Oral <User Schedule>      Other medications:   acetaminophen     Tablet .. 650 milliGRAM(s) Oral every 6 hours  Biotene Dry Mouth Oral Rinse 5 milliLiter(s) Swish and Spit five times a day  Chemotherapy Worklist Order      chlorhexidine 4% Liquid 1 Application(s) Topical <User Schedule>  folic acid 1 milliGRAM(s) Oral daily  hydrocortisone 1% Cream 1 Application(s) Topical every 12 hours  hydrocortisone sodium succinate Injectable 50 milliGRAM(s) IV Push every 24 hours  Infuse HPC Product      lidocaine/prilocaine Cream (Chemo) 1 Application(s) Topical daily  loratadine 10 milliGRAM(s) Oral daily  multivitamin 1 Tablet(s) Oral daily  sodium bicarbonate  Infusion (Chemo) 0.074 mEq/kG/Hr IV Continuous <Continuous>  sodium chloride 0.9% 1000 milliLiter(s) IV Continuous <Continuous>  sodium chloride 0.9%. 1000 milliLiter(s) IV Continuous <Continuous>  sodium chloride 0.9%. (Chemo) 1000 milliLiter(s) IV Continuous <Continuous>      PRN:   acetaminophen     Tablet .. 650 milliGRAM(s) Oral every 6 hours PRN  guaiFENesin  milliGRAM(s) Oral every 12 hours PRN  loperamide 2 milliGRAM(s) Oral every 4 hours PRN  oxyCODONE    IR 5 milliGRAM(s) Oral every 4 hours PRN  prochlorperazine   Injectable 10 milliGRAM(s) IV Push every 6 hours PRN  pseudoephedrine 30 milliGRAM(s) Oral every 6 hours PRN  senna 1 Tablet(s) Oral at bedtime PRN  sodium chloride 0.9% lock flush 10 milliLiter(s) IV Push every 1 hour PRN      A/P: 37 year old male with severe aplastic anemia with multiple lines of treatment admitted for a haplo-identical bone marrow transplant with  Flu / Cy / TBI preparative regimen   Day + 14  start CTX hydration - 1/2NS @ 200ml /hr    Strict I&O, daily weights, prn diuresis   Day -6 - day -2 - Fludarabine 30mg/m2 IV  Day -6 - day - 5 - CTX 14.5mg/kg/day IV. CTX to start after urine SG < 1.010. Mesna  12mg / kg - hemorrhagic cystitis ppx   Day -2 - IVIG 0.4 g/kg (ideal body weight) every 3 weeks. Premedication with Tylenol and benadryl   Day -1 -  cGy x 1 dose   Day - 1 - at 2200 begin transplant hydration : 0.45Ns + 1 amp (50mEq) sodium bicarbonate at 150ml /hr; continue transplant hydration for 24 hours post infusion    Day + 2 at 0 - begin CTX hydration (0.45NS + 10 mEq KCl/ @150ml /m2  continue 24 hours post last dose of CTX   Day + 3 - + 4 - CTX 50mg/kg/day IV. Begin CTX when urine SG < 1.010. EKG daily. Mesna for hemorraghic cystitis ppx.   Day + 5 - start Zarxio,  MMF and Tacrolimus. Check Tacrolimus levels on Monday and Thursday.   when ANC < 500, start Cipro 500mg po BID. If becomes febrile, pan cx, CXR and change Cipro to Cefepime 2g IV q 8 hours. Continue until count recovery  - Fludarabine , intermittent nausea, continue antiemetics   - Fludarabine , IVIG  7 TBI   - HPC transplant today. Continue transplant hydration for 24 hours post infusion of bone marrow   - monitor for CRS / haplo storm - if T >/= 102.5 on days +1 or +2, would dose tocilizumab 8mg / kg IVPB x 1    C.Diff negative, started imodium  - post transplant CTX 2.2 - continue CTX hydration for 24 hours post infusion of last dose , Tocilizumab x1 dose O/N for temp of 39.1.   6/22 eye pain overnight, no visual acuity deficits. Opthalmology evaluating - appreciated - optic disc is sharp/pink, overall suggesting against a diagnosis of optic neuritis. Follow up CT maxillofacial/orbits -     1. Infectious Disease:   acyclovir   Oral Tab/Cap 400 milliGRAM(s) Oral every 8 hours  clotrimazole Lozenge 1 Lozenge Oral five times a day  fluconAZOLE   Tablet 400 milliGRAM(s) Oral daily  piperacillin/tazobactam IVPB.. 4.5 Gram(s) IV Intermittent every 8 hours    2. VOD Prophylaxis: Actigall, Glutamine, Heparin (dosed at 100 units / kg / day)     3. GI Prophylaxis:    famotidine    Tablet 20 milliGRAM(s) Oral two times a day    4. Mouthcare - NS / NaHCO3 rinses, Mycelex, Biotene; Skin care     5. GVHD prophylaxis   TBI day -1   CTX days +3, +4   mycophenolate mofetil 1000 milliGRAM(s) Oral three times a day  tacrolimus 3 milliGRAM(s) Oral <User Schedule>    6. Transfuse & replete electrolytes prn   1 unit PRBC   1 bag platelets     7. IV hydration, daily weights, strict I&O, prn diuresis     8. PO intake as tolerated, nutrition follow up as needed, MVI, folic acid     9. Antiemetics, anti-diarrhea medications:   loperamide 2 milliGRAM(s) Oral every 4 hours PRN  prochlorperazine   Injectable 10 milliGRAM(s) IV Push every 6 hours PRN    10. OOB as tolerated, physical therapy consult if needed     11. Monitor coags / fibrinogen 2x week, vitamin K as needed     12. Monitor closely for clinical changes, monitor for fevers     13. Emotional support provided, plan of care discussed and questions addressed     14. Patient education done regarding plan of care, restrictions and discharge planning     15. Continue regular social work input     I have written the above note for Dr. Lima who performed service with me in the room.   Marcellus Waldron PA-C ([phone])    I have seen and examined patient with PA,  I agree with above note as scribed.

## 2023-06-22 NOTE — PROGRESS NOTE ADULT - NS ATTEST RISK PROBLEM GEN_ALL_CORE FT
Patient has aplastic anemia and is being treated with haplo-SCT, which carries a risk for infection, bleeding, and other life-threatening complications.

## 2023-06-22 NOTE — PROGRESS NOTE ADULT - NS ATTEST RISK GEN_ALL_CORE
Risk Statement (NON-critical care)

## 2023-06-22 NOTE — PROVIDER CONTACT NOTE (MEDICATION) - REASON
pt.  complained of discomfort on his left side of the face and eye, also made a comment that it feels like his left eye is going to pop out
Pt. complaining of sinus pressure,states Sudafed did not help

## 2023-06-22 NOTE — PHARMACOTHERAPY INTERVENTION NOTE - COMMENTS
37-year-old male with severe aplastic anemia previously treated with eATG +CsA + eltrombopag in April 2020. He relapsed November 2021 and was treated with rATG + CSA + prednisone + eltombopag and then ravulizumab. He is here for a haploidentical allogeneic stem cell transplant, today is day +14.     Most recent tacrolimus trough is 1.9 (drawn correctly, at steady state on 6/22) - agree with increasing dose to 4 mg every 12 hours to reach goal of 5-10 and re-checking a level on Monday, 6/26. Patient started letermovir today (6/22) which will also increase the tacrolimus level. Will monitor and adjust dose to reach trough goal of 5-10.    Asmita Carranza, PharmD, [facility]  Stem Cell Transplant Clinical Pharmacy Specialist

## 2023-06-22 NOTE — CONSULT NOTE ADULT - ASSESSMENT
37 year old male with severe aplastic anemia admitted for a haplo-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 Ophthalmology consulted for L-eye pain.    #L-eye pain and headache  -Patient reports new onset left eye pressure-like pain this AM exacerbated with rightward gaze  -VA 20/20 OU with normal IOP  -No erythema, discharge, or conjunctival papillary/follicular reaction to suggest conjunctivitis  -Cornea clear OU with no epi defects to suggest abrasion   -Although patient with L-eye pain on right gaze, color plates are full/symmetric and optic disc is sharp/pink, overall suggesting against a diagnosis of optic neuritis   -No blurry vision, photophobia, and erythema to suggest scleritis or episcleritis   -Overall, no ophthalmological pathology identified on exam to explain current symptoms  -Symptoms possibly iso sinusitis vs migraine HA  -Recommend neurological eval for migraine HA  -Please inform the service if patient's symptoms worsen or new symptoms arise    Case SDW Dr. Crabtree  37 year old male with severe aplastic anemia admitted for a haplo-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 Ophthalmology consulted for L-eye pain.    #L-eye pain and headache  -Patient reports new onset left eye pressure-like pain this AM exacerbated with rightward gaze  -VA 20/20 OU with normal IOP  -No erythema, discharge, or conjunctival papillary/follicular reaction to suggest conjunctivitis  -Cornea clear OU with no epi defects to suggest abrasion   -Although patient with L-eye pain on right gaze, color plates are full/symmetric and optic disc is sharp/pink, overall suggesting against a diagnosis of optic neuritis   -No blurry vision, photophobia, and erythema to suggest scleritis or episcleritis   -Overall, no ophthalmological pathology identified on exam to explain current symptoms  -Symptoms possibly iso sinusitis vs migraine HA  -Recommend neurological eval for migraine HA  -obtain CT max/face with contrast for sinus eval  -Please inform the service if patient's symptoms worsen or new symptoms arise    Case SDW Dr. Crabtree   Seen and discussed with Dr. Monte, [phone], 6/22/23

## 2023-06-22 NOTE — CONSULT NOTE ADULT - SUBJECTIVE AND OBJECTIVE BOX
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 Hematologic history as follows: initially diagnosed by bone marrow biopsy 4/20, began treatment 4/23/20 with horse ATG + CSA + promacta and achieved CR1. A bone marrow biopsy 11/9/20 showed hypocellular marrow, with overall improved cellularity showing marrow regeneration. Bone marrow biopsy 5/18/21 showed normal morphology and normal cellularity. He relapsed 11/2021, developing worsening cytopenias and neutropenic fevers. He was then treated with rabbit ATG, prednisone, CSA and promacta with partial response 12/27/21. He later developed worsening hemolysis and pancytopenia, with a rising PNH clone and was treated with Ravulizumab. He has no complaints on admission.  (02 Jun 2023 12:14)    Interval History: Patient reports that 4 days ago, he started developing "sinus pressure" with associated nasal congestion. No fevers, cough or sore throat. Adds that this morning, he developed "pressure-like" pain in the right eye. Denies headache, nausea/vomiting, blurry vision, double vision, eye redness, discharge, FBS, flashes, floaters, or curtain over vision. Does report some pain in the left eye when looking towards the right and epiphora in the left eye. No trauma to the eye.     PAST MEDICAL & SURGICAL HISTORY:  Asthma, stable      Redundant prepuce and phimosis      H/O circumcision        Past Ocular History: none  Ophthalmic Medications: none  FAMILY HISTORY:  FH: type 2 diabetes mellitus (Grandparent)      Social History: No tobacco, alcohol, or recreational substance use.     MEDICATIONS  (STANDING):  acetaminophen     Tablet .. 650 milliGRAM(s) Oral every 6 hours  acyclovir   Oral Tab/Cap 400 milliGRAM(s) Oral every 8 hours  Biotene Dry Mouth Oral Rinse 5 milliLiter(s) Swish and Spit five times a day  Chemotherapy Worklist Order      chlorhexidine 4% Liquid 1 Application(s) Topical <User Schedule>  clotrimazole Lozenge 1 Lozenge Oral five times a day  famotidine    Tablet 20 milliGRAM(s) Oral two times a day  filgrastim-sndz (ZARXIO) Injectable (Chemo) 480 MICROGram(s) SubCutaneous every 24 hours  fluconAZOLE   Tablet 400 milliGRAM(s) Oral daily  folic acid 1 milliGRAM(s) Oral daily  heparin  Infusion 424 Unit(s)/Hr (4.24 mL/Hr) IV Continuous <Continuous>  hydrocortisone 1% Cream 1 Application(s) Topical every 12 hours  hydrocortisone sodium succinate Injectable 50 milliGRAM(s) IV Push every 24 hours  Infuse HPC Product      letermovir 480 milliGRAM(s) Oral daily  lidocaine/prilocaine Cream (Chemo) 1 Application(s) Topical daily  loratadine 10 milliGRAM(s) Oral daily  multivitamin 1 Tablet(s) Oral daily  mycophenolate mofetil 1000 milliGRAM(s) Oral three times a day  piperacillin/tazobactam IVPB.. 4.5 Gram(s) IV Intermittent every 8 hours  sodium bicarbonate  Infusion (Chemo) 0.074 mEq/kG/Hr (150 mL/Hr) IV Continuous <Continuous>  sodium bicarbonate Mouth Rinse 10 milliLiter(s) Swish and Spit five times a day  sodium chloride 0.9% 1000 milliLiter(s) (288 mL/Hr) IV Continuous <Continuous>  sodium chloride 0.9%. 1000 milliLiter(s) (20 mL/Hr) IV Continuous <Continuous>  sodium chloride 0.9%. (Chemo) 1000 milliLiter(s) (200 mL/Hr) IV Continuous <Continuous>  tacrolimus 3 milliGRAM(s) Oral <User Schedule>  ursodiol Capsule 300 milliGRAM(s) Oral two times a day    MEDICATIONS  (PRN):  acetaminophen     Tablet .. 650 milliGRAM(s) Oral every 6 hours PRN Temp greater or equal to 38C (100.4F), Mild Pain (1 - 3)  guaiFENesin  milliGRAM(s) Oral every 12 hours PRN sinus congestion  loperamide 2 milliGRAM(s) Oral every 4 hours PRN Diarrhea  oxyCODONE    IR 5 milliGRAM(s) Oral every 4 hours PRN Moderate Pain (4 - 6)  prochlorperazine   Injectable 10 milliGRAM(s) IV Push every 6 hours PRN nausea  pseudoephedrine 30 milliGRAM(s) Oral every 6 hours PRN sinus pain  senna 1 Tablet(s) Oral at bedtime PRN Constipation  sodium chloride 0.9% lock flush 10 milliLiter(s) IV Push every 1 hour PRN Pre/post blood products, medications, blood draw, and to maintain line patency    Allergies & Intolerances:   No Known Allergies    Review of Systems:  Constitutional: No fever, chills  Eyes: No blurry vision, flashes, floaters, FBS, erythema, discharge, double vision, OU  Neuro: No tremors  Cardiovascular: No chest pain, palpitations  Respiratory: No SOB, no cough  GI: No nausea, vomiting, abdominal pain  : No dysuria  Skin: no rash  Psych: no depression  Endocrine: no polyuria, polydipsia  Heme/lymph: no swelling    VITALS: T(C): 37 (06-22-23 @ 05:00)  T(F): 98.6 (06-22-23 @ 05:00), Max: 98.6 (06-21-23 @ 23:09)  HR: 114 (06-22-23 @ 05:00) (79 - 119)  BP: 128/80 (06-22-23 @ 05:00) (96/57 - 157/89)  RR:  (17 - 21)  SpO2:  (95% - 99%)  Wt(kg): --  General: AAO x 3, appropriate mood and affect    Ophthalmology Exam:  Visual acuity (sc): 20/20 OU  Pupils: Pinpoint pupils, nonreactive to light.   Ttono: 14 OU  Extraocular movements (EOMs): Full OU, no diplopia, + pain OS with right gaze   Confrontational Visual Field (CVF): Full OU  Color Plates: 13/16 OD, 15/16 OS     Pen Light Exam (PLE)  External: Flat OU  Lids/Lashes/Lacrimal Ducts: Flat OU    Sclera/Conjunctiva: W+Q OU  Cornea: Cl OU  Anterior Chamber: D+F OU    Iris: Flat OU  Lens: Cl OU    Fundus Exam: dilated with 1% tropicamide and 2.5% phenylephrine  Approval obtained from primary team for dilation  Patient aware that pupils can remained dilated for at least 4-6 hours  Exam performed with 20D lens    Vitreous: wnl OU  Disc, cup/disc: sharp and pink, 0.4 OU  Macula: wnl OU  Vessels: wnl OU  Periphery: wnl OU    Labs/Imaging:  ***   [facility] DEPARTMENT OF OPHTHALMOLOGY - INITIAL ADULT CONSULT  ----------------------------------------------------------------------------------------------------  Sofia Hayes MD, PGY-1  -------------------------------------------------------------------------------------------------    HPI:  This is a 37 year old male with severe aplastic anemia admitted for a haplo-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 Hematologic history as follows: initially diagnosed by bone marrow biopsy 4/20, began treatment 4/23/20 with horse ATG + CSA + promacta and achieved CR1. A bone marrow biopsy 11/9/20 showed hypocellular marrow, with overall improved cellularity showing marrow regeneration. Bone marrow biopsy 5/18/21 showed normal morphology and normal cellularity. He relapsed 11/2021, developing worsening cytopenias and neutropenic fevers. He was then treated with rabbit ATG, prednisone, CSA and promacta with partial response 12/27/21. He later developed worsening hemolysis and pancytopenia, with a rising PNH clone and was treated with Ravulizumab. He has no complaints on admission.  (02 Jun 2023 12:14)    Interval History: Patient reports that 4 days ago, he started developing "sinus pressure" with associated nasal congestion. No fevers, cough or sore throat. Adds that this morning, he developed "pressure-like" pain in the right eye, max 5/10 in severity. Denies headache, nausea/vomiting, blurry vision, double vision, eye redness, discharge, FBS, flashes, floaters, or curtain over vision. Does report some pain in the left eye when looking towards the right and epiphora in the left eye. No trauma to the eye.     PAST MEDICAL & SURGICAL HISTORY:  Asthma, stable      Redundant prepuce and phimosis      H/O circumcision        Past Ocular History: none  Ophthalmic Medications: none  FAMILY HISTORY:  FH: type 2 diabetes mellitus (Grandparent)      Social History: No tobacco, alcohol, or recreational substance use.     MEDICATIONS  (STANDING):  acetaminophen     Tablet .. 650 milliGRAM(s) Oral every 6 hours  acyclovir   Oral Tab/Cap 400 milliGRAM(s) Oral every 8 hours  Biotene Dry Mouth Oral Rinse 5 milliLiter(s) Swish and Spit five times a day  Chemotherapy Worklist Order      chlorhexidine 4% Liquid 1 Application(s) Topical <User Schedule>  clotrimazole Lozenge 1 Lozenge Oral five times a day  famotidine    Tablet 20 milliGRAM(s) Oral two times a day  filgrastim-sndz (ZARXIO) Injectable (Chemo) 480 MICROGram(s) SubCutaneous every 24 hours  fluconAZOLE   Tablet 400 milliGRAM(s) Oral daily  folic acid 1 milliGRAM(s) Oral daily  heparin  Infusion 424 Unit(s)/Hr (4.24 mL/Hr) IV Continuous <Continuous>  hydrocortisone 1% Cream 1 Application(s) Topical every 12 hours  hydrocortisone sodium succinate Injectable 50 milliGRAM(s) IV Push every 24 hours  Infuse HPC Product      letermovir 480 milliGRAM(s) Oral daily  lidocaine/prilocaine Cream (Chemo) 1 Application(s) Topical daily  loratadine 10 milliGRAM(s) Oral daily  multivitamin 1 Tablet(s) Oral daily  mycophenolate mofetil 1000 milliGRAM(s) Oral three times a day  piperacillin/tazobactam IVPB.. 4.5 Gram(s) IV Intermittent every 8 hours  sodium bicarbonate  Infusion (Chemo) 0.074 mEq/kG/Hr (150 mL/Hr) IV Continuous <Continuous>  sodium bicarbonate Mouth Rinse 10 milliLiter(s) Swish and Spit five times a day  sodium chloride 0.9% 1000 milliLiter(s) (288 mL/Hr) IV Continuous <Continuous>  sodium chloride 0.9%. 1000 milliLiter(s) (20 mL/Hr) IV Continuous <Continuous>  sodium chloride 0.9%. (Chemo) 1000 milliLiter(s) (200 mL/Hr) IV Continuous <Continuous>  tacrolimus 3 milliGRAM(s) Oral <User Schedule>  ursodiol Capsule 300 milliGRAM(s) Oral two times a day    MEDICATIONS  (PRN):  acetaminophen     Tablet .. 650 milliGRAM(s) Oral every 6 hours PRN Temp greater or equal to 38C (100.4F), Mild Pain (1 - 3)  guaiFENesin  milliGRAM(s) Oral every 12 hours PRN sinus congestion  loperamide 2 milliGRAM(s) Oral every 4 hours PRN Diarrhea  oxyCODONE    IR 5 milliGRAM(s) Oral every 4 hours PRN Moderate Pain (4 - 6)  prochlorperazine   Injectable 10 milliGRAM(s) IV Push every 6 hours PRN nausea  pseudoephedrine 30 milliGRAM(s) Oral every 6 hours PRN sinus pain  senna 1 Tablet(s) Oral at bedtime PRN Constipation  sodium chloride 0.9% lock flush 10 milliLiter(s) IV Push every 1 hour PRN Pre/post blood products, medications, blood draw, and to maintain line patency    Allergies & Intolerances:   No Known Allergies    Review of Systems:  Constitutional: No fever, chills  Eyes: No blurry vision, flashes, floaters, FBS, erythema, discharge, double vision, OU  Neuro: No tremors  Cardiovascular: No chest pain, palpitations  Respiratory: No SOB, no cough  GI: No nausea, vomiting, abdominal pain  : No dysuria  Skin: no rash  Psych: no depression  Endocrine: no polyuria, polydipsia  Heme/lymph: no swelling    VITALS: T(C): 37 (06-22-23 @ 05:00)  T(F): 98.6 (06-22-23 @ 05:00), Max: 98.6 (06-21-23 @ 23:09)  HR: 114 (06-22-23 @ 05:00) (79 - 119)  BP: 128/80 (06-22-23 @ 05:00) (96/57 - 157/89)  RR:  (17 - 21)  SpO2:  (95% - 99%)  Wt(kg): --  General: AAO x 3, appropriate mood and affect    Ophthalmology Exam:  Visual acuity (sc): 20/20 OU  Pupils: 1mm pupils, nonreactive to light (likely iso opiates)  Ttono: 14 OU  Extraocular movements (EOMs): Full OU, no diplopia, + pain OS with right gaze   Confrontational Visual Field (CVF): Full OU  Color Plates: 14/16 OD, 15/16 OS;  no red desaturation     Pen Light Exam (PLE)  External: Flat OU  Lids/Lashes/Lacrimal Ducts: Flat OU    Sclera/Conjunctiva: W+Q OU  Cornea: Cl OU  Anterior Chamber: D+F OU    Iris: Flat OU  Lens: Cl OU    Fundus Exam: dilated with 1% tropicamide and 2.5% phenylephrine  Approval obtained from primary team for dilation  Patient aware that pupils can remained dilated for at least 4-6 hours  Exam performed with 20D lens    Vitreous: wnl OU  Disc, cup/disc: sharp and pink, 0.4 OU  Macula: wnl OU  Vessels: wnl OU  Periphery: wnl OU

## 2023-06-22 NOTE — CHART NOTE - NSCHARTNOTEFT_GEN_A_CORE
Medicine PA Episodic Note    Patient is a 37y old  Male who presents with a chief complaint of Haplo-identical bone marrow transplant from his sister (6/12) with Flu / Cy / TBI prep regimen for treatment of severe aplastic anemia (21 Jun 2023 07:51)    Notified by RN of pt. having L eye pain and endorsing that he feels "like my eye is going to pop out." Pt. seen and examined at bedside, resting, AOx3, in NAD. Upon questioning, pt. states that he has been having ongoing L eye pain x 4 days. Pt. states the pain has been the same and describes it as a sharp pain. Pt. states the pain radiates down his left cheek. He currently rates the pain a 2/10 on pain scale after taking oxycodone (previously pain was 5/10). Pt. denies lightheadedness, dizziness, headache, blurry vision, vision changes, or other acute symptoms.     Vital Signs Last 24 Hrs  T(C): 37 (22 Jun 2023 05:00), Max: 37 (21 Jun 2023 23:09)  T(F): 98.6 (22 Jun 2023 05:00), Max: 98.6 (21 Jun 2023 23:09)  HR: 114 (22 Jun 2023 05:00) (79 - 119)  BP: 128/80 (22 Jun 2023 05:00) (96/57 - 157/89)  BP(mean): --  RR: 21 (22 Jun 2023 05:00) (17 - 21)  SpO2: 98% (22 Jun 2023 05:00) (95% - 99%)    Parameters below as of 22 Jun 2023 05:00  Patient On (Oxygen Delivery Method): room air          Labs:                          6.6    <0.1  )-----------( 7        ( 21 Jun 2023 06:47 )             19.0     06-21    139  |  104  |  14  ----------------------------<  100<H>  4.1   |  24  |  0.65    Ca    9.3      21 Jun 2023 06:48  Phos  4.1     06-21  Mg     1.6     06-21    TPro  6.4  /  Alb  4.2  /  TBili  0.9  /  DBili  0.2  /  AST  12  /  ALT  31  /  AlkPhos  83  06-21      Physical Exam:  General: WN/WD NAD  Neurology: A&Ox3, nonfocal, WRIGHT x 4  Head:  Normocephalic, atraumatic, PERRLA, EOMI, pain in L eye w/ movement of eye to the right, L sclera is pink, slightly injected w/ associated tearing  MSK: No edema, + peripheral pulses, FROM all 4 extremity    Assessment & Plan:  HPI:  This is a 37 year old male with severe aplastic anemia admitted for a haplo-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 Hematologic history as follows: initially diagnosed by bone marrow biopsy 4/20, began treatment 4/23/20 with horse ATG + CSA + promacta and achieved CR1. A bone marrow biopsy 11/9/20 showed hypocellular marrow, with overall improved cellularity showing marrow regeneration. Bone marrow biopsy 5/18/21 showed normal morphology and normal cellularity. He relapsed 11/2021, developing worsening cytopenias and neutropenic fevers. He was then treated with rabbit ATG, prednisone, CSA and promacta with partial response 12/27/21. He later developed worsening hemolysis and pancytopenia, with a rising PNH clone and was treated with Ravulizumab. He has no complaints on admission.  (02 Jun 2023 12:14)    Now w/ ongoing L eye pain x 4 days.     Plan:  #L eye pain  - C/W oxycodone PRN for pain  - Pt. is currently non-focal  - Case d/w Dr. Bruce, ophthalmology c/s called  - Case d/w Dr. Hayes (ophthalmology), will see pt.  - Will endorse to day team in AM      Follow up with Attending in AM.    Sandra Tavares PA-C  Department of Medicine  [facility] [id number]

## 2023-06-23 LAB
ALBUMIN SERPL ELPH-MCNC: 4.3 G/DL — SIGNIFICANT CHANGE UP (ref 3.3–5)
ALP SERPL-CCNC: 88 U/L — SIGNIFICANT CHANGE UP (ref 40–120)
ALT FLD-CCNC: 37 U/L — SIGNIFICANT CHANGE UP (ref 10–45)
ANION GAP SERPL CALC-SCNC: 13 MMOL/L — SIGNIFICANT CHANGE UP (ref 5–17)
AST SERPL-CCNC: 16 U/L — SIGNIFICANT CHANGE UP (ref 10–40)
BILIRUB SERPL-MCNC: 0.6 MG/DL — SIGNIFICANT CHANGE UP (ref 0.2–1.2)
BLD GP AB SCN SERPL QL: NEGATIVE — SIGNIFICANT CHANGE UP
BUN SERPL-MCNC: 8 MG/DL — SIGNIFICANT CHANGE UP (ref 7–23)
CALCIUM SERPL-MCNC: 9.5 MG/DL — SIGNIFICANT CHANGE UP (ref 8.4–10.5)
CHLORIDE SERPL-SCNC: 104 MMOL/L — SIGNIFICANT CHANGE UP (ref 96–108)
CO2 SERPL-SCNC: 25 MMOL/L — SIGNIFICANT CHANGE UP (ref 22–31)
CREAT SERPL-MCNC: 0.63 MG/DL — SIGNIFICANT CHANGE UP (ref 0.5–1.3)
EGFR: 127 ML/MIN/1.73M2 — SIGNIFICANT CHANGE UP
GLUCOSE SERPL-MCNC: 105 MG/DL — HIGH (ref 70–99)
HCT VFR BLD CALC: 21 % — CRITICAL LOW (ref 39–50)
HGB BLD-MCNC: 7.6 G/DL — LOW (ref 13–17)
LDH SERPL L TO P-CCNC: 131 U/L — SIGNIFICANT CHANGE UP (ref 50–242)
MAGNESIUM SERPL-MCNC: 1.6 MG/DL — SIGNIFICANT CHANGE UP (ref 1.6–2.6)
MCHC RBC-ENTMCNC: 30.9 PG — SIGNIFICANT CHANGE UP (ref 27–34)
MCHC RBC-ENTMCNC: 36.2 GM/DL — HIGH (ref 32–36)
MCV RBC AUTO: 85.4 FL — SIGNIFICANT CHANGE UP (ref 80–100)
NRBC # BLD: 0 /100 WBCS — SIGNIFICANT CHANGE UP (ref 0–0)
PHOSPHATE SERPL-MCNC: 4.2 MG/DL — SIGNIFICANT CHANGE UP (ref 2.5–4.5)
PLATELET # BLD AUTO: 13 K/UL — CRITICAL LOW (ref 150–400)
POTASSIUM SERPL-MCNC: 3.9 MMOL/L — SIGNIFICANT CHANGE UP (ref 3.5–5.3)
POTASSIUM SERPL-SCNC: 3.9 MMOL/L — SIGNIFICANT CHANGE UP (ref 3.5–5.3)
PROT SERPL-MCNC: 6.9 G/DL — SIGNIFICANT CHANGE UP (ref 6–8.3)
RBC # BLD: 2.46 M/UL — LOW (ref 4.2–5.8)
RBC # FLD: 12.8 % — SIGNIFICANT CHANGE UP (ref 10.3–14.5)
RH IG SCN BLD-IMP: POSITIVE — SIGNIFICANT CHANGE UP
SODIUM SERPL-SCNC: 142 MMOL/L — SIGNIFICANT CHANGE UP (ref 135–145)
WBC # BLD: 0.09 K/UL — CRITICAL LOW (ref 3.8–10.5)
WBC # FLD AUTO: 0.09 K/UL — CRITICAL LOW (ref 3.8–10.5)

## 2023-06-23 PROCEDURE — 99233 SBSQ HOSP IP/OBS HIGH 50: CPT

## 2023-06-23 RX ORDER — PHENYLEPHRINE-SHARK LIVER OIL-MINERAL OIL-PETROLATUM RECTAL OINTMENT
1 OINTMENT (GRAM) RECTAL THREE TIMES A DAY
Refills: 0 | Status: DISCONTINUED | OUTPATIENT
Start: 2023-06-23 | End: 2023-06-30

## 2023-06-23 RX ORDER — AER TRAVELER 0.5 G/1
1 SOLUTION RECTAL; TOPICAL
Refills: 0 | Status: DISCONTINUED | OUTPATIENT
Start: 2023-06-23 | End: 2023-06-30

## 2023-06-23 RX ORDER — ACYCLOVIR SODIUM 500 MG
400 VIAL (EA) INTRAVENOUS EVERY 12 HOURS
Refills: 0 | Status: DISCONTINUED | OUTPATIENT
Start: 2023-06-23 | End: 2023-06-30

## 2023-06-23 RX ORDER — DIBUCAINE 1 %
1 OINTMENT (GRAM) RECTAL
Refills: 0 | Status: DISCONTINUED | OUTPATIENT
Start: 2023-06-23 | End: 2023-06-30

## 2023-06-23 RX ADMIN — Medication 10 MILLILITER(S): at 17:01

## 2023-06-23 RX ADMIN — FAMOTIDINE 20 MILLIGRAM(S): 10 INJECTION INTRAVENOUS at 17:02

## 2023-06-23 RX ADMIN — OXYMETAZOLINE HYDROCHLORIDE 2 SPRAY(S): 0.5 SPRAY NASAL at 17:06

## 2023-06-23 RX ADMIN — Medication 650 MILLIGRAM(S): at 15:00

## 2023-06-23 RX ADMIN — TACROLIMUS 4 MILLIGRAM(S): 5 CAPSULE ORAL at 05:34

## 2023-06-23 RX ADMIN — URSODIOL 300 MILLIGRAM(S): 250 TABLET, FILM COATED ORAL at 06:04

## 2023-06-23 RX ADMIN — Medication 650 MILLIGRAM(S): at 00:15

## 2023-06-23 RX ADMIN — CHLORHEXIDINE GLUCONATE 1 APPLICATION(S): 213 SOLUTION TOPICAL at 08:28

## 2023-06-23 RX ADMIN — FAMOTIDINE 20 MILLIGRAM(S): 10 INJECTION INTRAVENOUS at 05:31

## 2023-06-23 RX ADMIN — LETERMOVIR 480 MILLIGRAM(S): 480 TABLET, FILM COATED ORAL at 13:18

## 2023-06-23 RX ADMIN — Medication 1 SPRAY(S): at 06:01

## 2023-06-23 RX ADMIN — Medication 1 TABLET(S): at 18:02

## 2023-06-23 RX ADMIN — Medication 5 MILLILITER(S): at 20:32

## 2023-06-23 RX ADMIN — Medication 480 MICROGRAM(S): at 13:19

## 2023-06-23 RX ADMIN — Medication 1 TABLET(S): at 11:00

## 2023-06-23 RX ADMIN — Medication 1 TABLET(S): at 17:00

## 2023-06-23 RX ADMIN — PIPERACILLIN AND TAZOBACTAM 25 GRAM(S): 4; .5 INJECTION, POWDER, LYOPHILIZED, FOR SOLUTION INTRAVENOUS at 05:32

## 2023-06-23 RX ADMIN — Medication 400 MILLIGRAM(S): at 13:17

## 2023-06-23 RX ADMIN — MYCOPHENOLATE MOFETIL 1000 MILLIGRAM(S): 250 CAPSULE ORAL at 05:31

## 2023-06-23 RX ADMIN — Medication 10 MILLILITER(S): at 20:33

## 2023-06-23 RX ADMIN — Medication 1 LOZENGE: at 17:02

## 2023-06-23 RX ADMIN — PHENYLEPHRINE-SHARK LIVER OIL-MINERAL OIL-PETROLATUM RECTAL OINTMENT 1 APPLICATION(S): at 17:07

## 2023-06-23 RX ADMIN — PIPERACILLIN AND TAZOBACTAM 25 GRAM(S): 4; .5 INJECTION, POWDER, LYOPHILIZED, FOR SOLUTION INTRAVENOUS at 21:32

## 2023-06-23 RX ADMIN — Medication 5 MILLILITER(S): at 13:14

## 2023-06-23 RX ADMIN — URSODIOL 300 MILLIGRAM(S): 250 TABLET, FILM COATED ORAL at 17:02

## 2023-06-23 RX ADMIN — Medication 650 MILLIGRAM(S): at 16:11

## 2023-06-23 RX ADMIN — MYCOPHENOLATE MOFETIL 1000 MILLIGRAM(S): 250 CAPSULE ORAL at 13:17

## 2023-06-23 RX ADMIN — FLUCONAZOLE 400 MILLIGRAM(S): 150 TABLET ORAL at 13:15

## 2023-06-23 RX ADMIN — OXYMETAZOLINE HYDROCHLORIDE 2 SPRAY(S): 0.5 SPRAY NASAL at 05:35

## 2023-06-23 RX ADMIN — Medication 5 MILLILITER(S): at 17:02

## 2023-06-23 RX ADMIN — Medication 1 LOZENGE: at 08:27

## 2023-06-23 RX ADMIN — Medication 1 SPRAY(S): at 17:05

## 2023-06-23 RX ADMIN — Medication 650 MILLIGRAM(S): at 22:25

## 2023-06-23 RX ADMIN — Medication 1 TABLET(S): at 13:14

## 2023-06-23 RX ADMIN — Medication 10 MILLILITER(S): at 08:27

## 2023-06-23 RX ADMIN — Medication 1 LOZENGE: at 20:33

## 2023-06-23 RX ADMIN — Medication 10 MILLILITER(S): at 13:13

## 2023-06-23 RX ADMIN — PIPERACILLIN AND TAZOBACTAM 25 GRAM(S): 4; .5 INJECTION, POWDER, LYOPHILIZED, FOR SOLUTION INTRAVENOUS at 13:16

## 2023-06-23 RX ADMIN — LORATADINE 10 MILLIGRAM(S): 10 TABLET ORAL at 13:15

## 2023-06-23 RX ADMIN — Medication 1 TABLET(S): at 10:56

## 2023-06-23 RX ADMIN — Medication 400 MILLIGRAM(S): at 05:31

## 2023-06-23 RX ADMIN — Medication 10 MILLIGRAM(S): at 03:05

## 2023-06-23 RX ADMIN — Medication 1 MILLIGRAM(S): at 13:15

## 2023-06-23 RX ADMIN — TACROLIMUS 4 MILLIGRAM(S): 5 CAPSULE ORAL at 20:34

## 2023-06-23 RX ADMIN — MYCOPHENOLATE MOFETIL 1000 MILLIGRAM(S): 250 CAPSULE ORAL at 21:32

## 2023-06-23 RX ADMIN — Medication 5 MILLILITER(S): at 08:28

## 2023-06-23 RX ADMIN — LIDOCAINE AND PRILOCAINE CREAM 1 APPLICATION(S): 25; 25 CREAM TOPICAL at 13:16

## 2023-06-23 RX ADMIN — Medication 650 MILLIGRAM(S): at 21:40

## 2023-06-23 RX ADMIN — AER TRAVELER 1 APPLICATION(S): 0.5 SOLUTION RECTAL; TOPICAL at 17:07

## 2023-06-23 RX ADMIN — Medication 1 LOZENGE: at 13:13

## 2023-06-23 NOTE — PROGRESS NOTE ADULT - SUBJECTIVE AND OBJECTIVE BOX
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 () with Flu / Cy / TBI prep regimen for treatment of severe aplastic anemia    S: Patient seen and examined with HPC Transplant Team:   + eye pain/pressure overnight  + general fatigue   + sinus pressure/pain, nasal congestion, post nasal drip induced cough  + decreased appetite    O: Vitals:   Vital Signs Last 24 Hrs  T(C): 36.6 (2023 05:35), Max: 37.1 (2023 00:01)  T(F): 97.9 (2023 05:35), Max: 98.8 (2023 00:01)  HR: 118 (2023 05:35) (99 - 118)  BP: 154/97 (2023 05:35) (127/86 - 154/97)  BP(mean): --  RR: 19 (2023 05:35) (18 - 21)  SpO2: 98% (:35) (97% - 100%)    Parameters below as of 2023 05:35  Patient On (Oxygen Delivery Method): room air        Admit weight:   Daily     Daily Weight in k.6 (2023 10:02)    Intake / Output:    @ 07:01  -   @ 07:00  --------------------------------------------------------  IN: 2051.4 mL / OUT: 3130 mL / NET: -1078.6 mL          PE:   Oropharynx: no erythema or ulcers  Oral Mucositis: (-)                                                        Grade: -  CVS: RRR, +S1,S2  Lungs: CTA B/L  Abdomen: Soft, NT, ND, +BS  Extremities: No edema in BLE  Gastric Mucositis: (-)                                                 Grade: -  Intestinal Mucositis:   (-)                                           Grade: -  Skin: no rash   TLC: CDI  Neuro: Alert, O x 3  Pain: Denies        Labs:           Cultures:   Culture - Blood (06.10.23 @ 21:45)    Specimen Source: .Blood Blood-Catheter   Culture Results:   No Growth Final    Culture - Urine (06.10.23 @ 22:18)    Specimen Source: Clean Catch Clean Catch (Midstream)   Culture Results:   No growth    Radiology:   Xray Chest 1 View- PORTABLE-Urgent (Xray Chest 1 View- PORTABLE-Urgent .) (06.10.23 @ 22:38) >  Clear lungs.      Meds:   Antimicrobials:   acyclovir   Oral Tab/Cap 400 milliGRAM(s) Oral every 8 hours  clotrimazole Lozenge 1 Lozenge Oral five times a day  fluconAZOLE   Tablet 400 milliGRAM(s) Oral daily  letermovir 480 milliGRAM(s) Oral daily  piperacillin/tazobactam IVPB.. 4.5 Gram(s) IV Intermittent every 8 hours      Heme / Onc:   heparin  Infusion 424 Unit(s)/Hr IV Continuous <Continuous>      GI:  famotidine    Tablet 20 milliGRAM(s) Oral two times a day  loperamide 2 milliGRAM(s) Oral every 4 hours PRN  senna 1 Tablet(s) Oral at bedtime PRN  sodium bicarbonate Mouth Rinse 10 milliLiter(s) Swish and Spit five times a day  ursodiol Capsule 300 milliGRAM(s) Oral two times a day      Cardiovascular:       Immunologic:   filgrastim-sndz (ZARXIO) Injectable (Chemo) 480 MICROGram(s) SubCutaneous every 24 hours  mycophenolate mofetil 1000 milliGRAM(s) Oral three times a day  tacrolimus 4 milliGRAM(s) Oral <User Schedule>      Other medications:   acetaminophen     Tablet .. 650 milliGRAM(s) Oral every 6 hours  Biotene Dry Mouth Oral Rinse 5 milliLiter(s) Swish and Spit five times a day  Chemotherapy Worklist Order      chlorhexidine 4% Liquid 1 Application(s) Topical <User Schedule>  folic acid 1 milliGRAM(s) Oral daily  hydrocortisone sodium succinate Injectable 50 milliGRAM(s) IV Push every 24 hours  Infuse HPC Product      lidocaine/prilocaine Cream (Chemo) 1 Application(s) Topical daily  loratadine 10 milliGRAM(s) Oral daily  multivitamin 1 Tablet(s) Oral daily  oxymetazoline 0.05% Nasal Spray 2 Spray(s) Both Nostrils two times a day  sodium bicarbonate  Infusion (Chemo) 0.074 mEq/kG/Hr IV Continuous <Continuous>  sodium chloride 0.65% Nasal 1 Spray(s) Both Nostrils two times a day  sodium chloride 0.9% 1000 milliLiter(s) IV Continuous <Continuous>  sodium chloride 0.9%. 1000 milliLiter(s) IV Continuous <Continuous>  sodium chloride 0.9%. (Chemo) 1000 milliLiter(s) IV Continuous <Continuous>      PRN:   acetaminophen     Tablet .. 650 milliGRAM(s) Oral every 6 hours PRN  acetaminophen 300 mG/butalbital 50 mG/ caffeine 40 mG 1 Capsule(s) Oral every 6 hours PRN  guaiFENesin  milliGRAM(s) Oral every 12 hours PRN  loperamide 2 milliGRAM(s) Oral every 4 hours PRN  oxyCODONE    IR 5 milliGRAM(s) Oral every 4 hours PRN  prochlorperazine   Injectable 10 milliGRAM(s) IV Push every 6 hours PRN  pseudoephedrine 30 milliGRAM(s) Oral every 6 hours PRN  senna 1 Tablet(s) Oral at bedtime PRN  sodium chloride 0.9% lock flush 10 milliLiter(s) IV Push every 1 hour PRN            A/P: 37 year old male with severe aplastic anemia with multiple lines of treatment admitted for a haplo-identical bone marrow transplant with  Flu / Cy / TBI preparative regimen   Day + 15  start CTX hydration - 1/2NS @ 200ml /hr    Strict I&O, daily weights, prn diuresis   Day -6 - day -2 - Fludarabine 30mg/m2 IV  Day -6 - day - 5 - CTX 14.5mg/kg/day IV. CTX to start after urine SG < 1.010. Mesna  12mg / kg - hemorrhagic cystitis ppx   Day -2 - IVIG 0.4 g/kg (ideal body weight) every 3 weeks. Premedication with Tylenol and benadryl   Day -1 -  cGy x 1 dose   Day - 1 - at 2200 begin transplant hydration : 0.45Ns + 1 amp (50mEq) sodium bicarbonate at 150ml /hr; continue transplant hydration for 24 hours post infusion    Day + 2 at 2200 - begin CTX hydration (0.45NS + 10 mEq KCl/ @150ml /m2  continue 24 hours post last dose of CTX   Day + 3 - + 4 - CTX 50mg/kg/day IV. Begin CTX when urine SG < 1.010. EKG daily. Mesna for hemorraghic cystitis ppx.   Day + 5 - start Zarxio,  MMF and Tacrolimus. Check Tacrolimus levels on Monday and Thursday.   when ANC < 500, start Cipro 500mg po BID. If becomes febrile, pan cx, CXR and change Cipro to Cefepime 2g IV q 8 hours. Continue until count recovery  - Fludarabine , intermittent nausea, continue antiemetics   - Fludarabine , IVIG   TBI   - HPC transplant today. Continue transplant hydration for 24 hours post infusion of bone marrow   - monitor for CRS / haplo storm - if T >/= 102.5 on days +1 or +2, would dose tocilizumab 8mg / kg IVPB x 1    C.Diff negative, started imodium  - post transplant CTX 2.2 - continue CTX hydration for 24 hours post infusion of last dose , Tocilizumab x1 dose O/N for temp of 39.1.    eye pain overnight, no visual acuity deficits. Opthalmology evaluating - appreciated - optic disc is sharp/pink, overall suggesting against a diagnosis of optic neuritis. Follow up CT maxillofacial/orbits -     1. Infectious Disease:   acyclovir   Oral Tab/Cap 400 milliGRAM(s) Oral every 8 hours  clotrimazole Lozenge 1 Lozenge Oral five times a day  fluconAZOLE   Tablet 400 milliGRAM(s) Oral daily  piperacillin/tazobactam IVPB.. 4.5 Gram(s) IV Intermittent every 8 hours    2. VOD Prophylaxis: Actigall, Glutamine, Heparin (dosed at 100 units / kg / day)     3. GI Prophylaxis:    famotidine    Tablet 20 milliGRAM(s) Oral two times a day    4. Mouthcare - NS / NaHCO3 rinses, Mycelex, Biotene; Skin care     5. GVHD prophylaxis   TBI day -1   CTX days +3, +4   mycophenolate mofetil 1000 milliGRAM(s) Oral three times a day  tacrolimus 3 milliGRAM(s) Oral <User Schedule>    6. Transfuse & replete electrolytes prn   1 unit PRBC   1 bag platelets     7. IV hydration, daily weights, strict I&O, prn diuresis     8. PO intake as tolerated, nutrition follow up as needed, MVI, folic acid     9. Antiemetics, anti-diarrhea medications:   loperamide 2 milliGRAM(s) Oral every 4 hours PRN  prochlorperazine   Injectable 10 milliGRAM(s) IV Push every 6 hours PRN    10. OOB as tolerated, physical therapy consult if needed     11. Monitor coags / fibrinogen 2x week, vitamin K as needed     12. Monitor closely for clinical changes, monitor for fevers     13. Emotional support provided, plan of care discussed and questions addressed     14. Patient education done regarding plan of care, restrictions and discharge planning     15. Continue regular social work input     I have written the above note for Dr. Lima who performed service with me in the room.   Adri Gracia([phone])    I have seen and examined patient with PA,  I agree with above note as scribed.              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 () with Flu / Cy / TBI prep regimen for treatment of severe aplastic anemia    S: Patient seen and examined with HPC Transplant Team:   + eye pain/pressure overnight  + general fatigue   + sinus pressure/pain, nasal congestion, post nasal drip induced cough  + decreased appetite    O: Vitals:   Vital Signs Last 24 Hrs  T(C): 36.6 (2023 05:35), Max: 37.1 (2023 00:01)  T(F): 97.9 (2023 05:35), Max: 98.8 (2023 00:01)  HR: 118 (2023 05:35) (99 - 118)  BP: 154/97 (2023 05:35) (127/86 - 154/97)  BP(mean): --  RR: 19 (2023 05:35) (18 - 21)  SpO2: 98% (:35) (97% - 100%)    Parameters below as of 2023 05:35  Patient On (Oxygen Delivery Method): room air    Admit weight: 102.8 kg   Daily Weight in k.6 (2023 10:02)  Daily weight: 95.5 kg     Intake / Output:    @ 07:01  -  - @ 07:00  --------------------------------------------------------  IN: 2051.4 mL / OUT: 3130 mL / NET: -1078.6 mL          PE:   Oropharynx: no erythema or ulcers  Oral Mucositis: (-)                                                        Grade: -  CVS: RRR, +S1,S2  Lungs: CTA B/L  Abdomen: Soft, NT, ND, +BS  Extremities: No edema in BLE  Gastric Mucositis: (-)                                                 Grade: -  Intestinal Mucositis:   (-)                                           Grade: -  Skin: no rash   TLC: CDI  Neuro: Alert, O x 3  Pain: Denies        Labs:   CBC Full  -  ( 2023 07:09 )  WBC Count : 0.09 K/uL  Hemoglobin : 7.6 g/dL  Hematocrit : 21.0 %  Platelet Count - Automated : 13 K/uL  Mean Cell Volume : 85.4 fl  Mean Cell Hemoglobin : 30.9 pg  Mean Cell Hemoglobin Concentration : 36.2 gm/dL  Auto Neutrophil # : x  Auto Lymphocyte # : x  Auto Monocyte # : x  Auto Eosinophil # : x  Auto Basophil # : x  Auto Neutrophil % : x  Auto Lymphocyte % : x  Auto Monocyte % : x  Auto Eosinophil % : x  Auto Basophil % : x      2023 07:08    142    |  104    |  8      ----------------------------<  105    3.9     |  25     |  0.63     Ca    9.5        2023 07:08  Phos  4.2       2023 07:08  Mg     1.6       2023 07:08    TPro  6.9    /  Alb  4.3    /  TBili  0.6    /  DBili  x      /  AST  16     /  ALT  37     /  AlkPhos  88     2023 07:08      Cultures:   Culture - Blood (06.10.23 @ 21:45)    Specimen Source: .Blood Blood-Catheter   Culture Results:   No Growth Final    Culture - Urine (06.10.23 @ 22:18)    Specimen Source: Clean Catch Clean Catch (Midstream)   Culture Results:   No growth    Radiology:   Xray Chest 1 View- PORTABLE-Urgent (Xray Chest 1 View- PORTABLE-Urgent .) (06.10.23 @ 22:38) >  Clear lungs.    < from: CT Maxillofacial w/ IV Cont (23 @ 16:48) >  IMPRESSION:  Pansinusitis, severe in the left maxillary and ethmoid sinuses, worsened   since comparison study.      Meds:   Antimicrobials:   acyclovir   Oral Tab/Cap 400 milliGRAM(s) Oral every 8 hours  clotrimazole Lozenge 1 Lozenge Oral five times a day  fluconAZOLE   Tablet 400 milliGRAM(s) Oral daily  letermovir 480 milliGRAM(s) Oral daily  piperacillin/tazobactam IVPB.. 4.5 Gram(s) IV Intermittent every 8 hours      Heme / Onc:   heparin  Infusion 424 Unit(s)/Hr IV Continuous <Continuous>      GI:  famotidine    Tablet 20 milliGRAM(s) Oral two times a day  loperamide 2 milliGRAM(s) Oral every 4 hours PRN  senna 1 Tablet(s) Oral at bedtime PRN  sodium bicarbonate Mouth Rinse 10 milliLiter(s) Swish and Spit five times a day  ursodiol Capsule 300 milliGRAM(s) Oral two times a day      Cardiovascular:       Immunologic:   filgrastim-sndz (ZARXIO) Injectable (Chemo) 480 MICROGram(s) SubCutaneous every 24 hours  mycophenolate mofetil 1000 milliGRAM(s) Oral three times a day  tacrolimus 4 milliGRAM(s) Oral <User Schedule>      Other medications:   acetaminophen     Tablet .. 650 milliGRAM(s) Oral every 6 hours  Biotene Dry Mouth Oral Rinse 5 milliLiter(s) Swish and Spit five times a day  Chemotherapy Worklist Order      chlorhexidine 4% Liquid 1 Application(s) Topical <User Schedule>  folic acid 1 milliGRAM(s) Oral daily  hydrocortisone sodium succinate Injectable 50 milliGRAM(s) IV Push every 24 hours  Infuse HPC Product      lidocaine/prilocaine Cream (Chemo) 1 Application(s) Topical daily  loratadine 10 milliGRAM(s) Oral daily  multivitamin 1 Tablet(s) Oral daily  oxymetazoline 0.05% Nasal Spray 2 Spray(s) Both Nostrils two times a day  sodium bicarbonate  Infusion (Chemo) 0.074 mEq/kG/Hr IV Continuous <Continuous>  sodium chloride 0.65% Nasal 1 Spray(s) Both Nostrils two times a day  sodium chloride 0.9% 1000 milliLiter(s) IV Continuous <Continuous>  sodium chloride 0.9%. 1000 milliLiter(s) IV Continuous <Continuous>  sodium chloride 0.9%. (Chemo) 1000 milliLiter(s) IV Continuous <Continuous>      PRN:   acetaminophen     Tablet .. 650 milliGRAM(s) Oral every 6 hours PRN  acetaminophen 300 mG/butalbital 50 mG/ caffeine 40 mG 1 Capsule(s) Oral every 6 hours PRN  guaiFENesin  milliGRAM(s) Oral every 12 hours PRN  loperamide 2 milliGRAM(s) Oral every 4 hours PRN  oxyCODONE    IR 5 milliGRAM(s) Oral every 4 hours PRN  prochlorperazine   Injectable 10 milliGRAM(s) IV Push every 6 hours PRN  pseudoephedrine 30 milliGRAM(s) Oral every 6 hours PRN  senna 1 Tablet(s) Oral at bedtime PRN  sodium chloride 0.9% lock flush 10 milliLiter(s) IV Push every 1 hour PRN            A/P: 37 year old male with severe aplastic anemia with multiple lines of treatment admitted for a haplo-identical bone marrow transplant with  Flu / Cy / TBI preparative regimen   Day + 15  start CTX hydration - 1/2NS @ 200ml /hr    Strict I&O, daily weights, prn diuresis   Day -6 - day -2 - Fludarabine 30mg/m2 IV  Day -6 - day - 5 - CTX 14.5mg/kg/day IV. CTX to start after urine SG < 1.010. Mesna  12mg / kg - hemorrhagic cystitis ppx   Day -2 - IVIG 0.4 g/kg (ideal body weight) every 3 weeks. Premedication with Tylenol and benadryl   Day -1 -  cGy x 1 dose   Day - 1 - at 2200 begin transplant hydration : 0.45Ns + 1 amp (50mEq) sodium bicarbonate at 150ml /hr; continue transplant hydration for 24 hours post infusion    Day + 2 at 2200 - begin CTX hydration (0.45NS + 10 mEq KCl/ @150ml /m2  continue 24 hours post last dose of CTX   Day + 3 - + 4 - CTX 50mg/kg/day IV. Begin CTX when urine SG < 1.010. EKG daily. Mesna for hemorraghic cystitis ppx.   Day + 5 - start Zarxio,  MMF and Tacrolimus. Check Tacrolimus levels on Monday and Thursday.   when ANC < 500, start Cipro 500mg po BID. If becomes febrile, pan cx, CXR and change Cipro to Cefepime 2g IV q 8 hours. Continue until count recovery  - Fludarabine , intermittent nausea, continue antiemetics   - Fludarabine , IVIG   TBI   - HPC transplant today. Continue transplant hydration for 24 hours post infusion of bone marrow   - monitor for CRS / haplo storm - if T >/= 102.5 on days +1 or +2, would dose tocilizumab 8mg / kg IVPB x 1    C.Diff negative, started imodium  - post transplant CTX 2.2 - continue CTX hydration for 24 hours post infusion of last dose , Tocilizumab x1 dose O/N for temp of 39.1.   6/22 eye pain overnight, no visual acuity deficits. Opthalmology evaluating - appreciated - optic disc is sharp/pink, overall suggesting against a diagnosis of optic neuritis. Follow up CT maxillofacial/orbits - c/w pansinusitis. Continue Zosyn; Afrin x 3 days follow up Flonase, Saline nasal spray     1. Infectious Disease:   acyclovir   Oral Tab/Cap 400 milliGRAM(s) Oral every 8 hours  clotrimazole Lozenge 1 Lozenge Oral five times a day  fluconAZOLE   Tablet 400 milliGRAM(s) Oral daily  piperacillin/tazobactam IVPB.. 4.5 Gram(s) IV Intermittent every 8 hours    2. VOD Prophylaxis: Actigall, Glutamine, Heparin (dosed at 100 units / kg / day)     3. GI Prophylaxis:    famotidine    Tablet 20 milliGRAM(s) Oral two times a day    4. Mouthcare - NS / NaHCO3 rinses, Mycelex, Biotene; Skin care     5. GVHD prophylaxis   TBI day -1   CTX days +3, +4   mycophenolate mofetil 1000 milliGRAM(s) Oral three times a day  tacrolimus 3 milliGRAM(s) Oral <User Schedule>    6. Transfuse & replete electrolytes prn   1 unit PRBC   1 bag platelets     7. IV hydration, daily weights, strict I&O, prn diuresis     8. PO intake as tolerated, nutrition follow up as needed, MVI, folic acid     9. Antiemetics, anti-diarrhea medications:   loperamide 2 milliGRAM(s) Oral every 4 hours PRN  prochlorperazine   Injectable 10 milliGRAM(s) IV Push every 6 hours PRN    10. OOB as tolerated, physical therapy consult if needed     11. Monitor coags / fibrinogen 2x week, vitamin K as needed     12. Monitor closely for clinical changes, monitor for fevers     13. Emotional support provided, plan of care discussed and questions addressed     14. Patient education done regarding plan of care, restrictions and discharge planning     15. Continue regular social work input     I have written the above note for Dr. Lima who performed service with me in the room.   Adri Gracia([phone])    I have seen and examined patient with PA,  I agree with above note as scribed.

## 2023-06-23 NOTE — PROGRESS NOTE ADULT - NS ATTEND AMEND GEN_ALL_CORE FT
Today is day + 15 s/p  haplo-identical(sister) bone marrow transplant with  Flu / Cy / TBI preparative regimen for severe aplastic anemia.        Admit to BMTU  2. TLC placement in IR   3. Day -6 - day + 5 - antiemetics   4. Day - 6 start CTX hydration - 1/2NS @ 200ml /hr    5. Strict I&O, daily weights, prn diuresis   6. Day -6 - day -2 - Fludarabine 30mg/m2 IV  7. Day -6 - day - 5 - CTX 14.5mg/kg/day IV. CTX to start after urine SG < 1.010. Mesna  12mg / kg - hemorrhagic cystitis ppx   8. Day -2 - IVIG 0.4 g/kg (ideal body weight) every 3 weeks. Premedication with Tylenol and benadryl  9. Day -1 -  cGy x 1 dose   10. Day - 1 - at 2200 begin transplant hydration : 0.45Ns + 1 amp (50mEq) sodium bicarbonate at 150ml /hr; continue transplant hydration for 24 hours post infusion   11. Day 0 – HPC transplant   12. Day + 2 at 2200 - begin CTX hydration (0.45NS + 10 mEq KCl/ @150ml /m2  continue 24 hours post last dose of CTX   13. Day + 3 - + 4 - CTX 50mg/kg/day IV. Begin CTX when urine SG < 1.010. EKG daily. Mesna for hemorraghic cystitis ppx.   14. Day + 5 - start Zarxio,  MMF and Tacrolimus. Check Tacrolimus levels on Monday and Thursday.   15. Anxiolytics, antinausea, antidiarrhea medications as needed   16. Lasix PRN while being aggressively hydrated to avoid VOD   17. Nutritional support, pain management  Need for prophylactic measures:  1. VOD prophylaxis - low dose heparin gtt (dosed at 100 units / kg / day), glutamine supplementation, Actigall BID   2. PCP prophylaxis - Bactrim DS through day -2    3. Antiviral prophylaxis - Continue Acyclovir   4. Antifungal prophylaxis- Diflucan 400 mg po daily.  5.. GI prophylaxis - Protonix po QD   6. Antibacterial prophylaxis -  ANC < 500, started Cipro 500mg po BID. If becomes febrile, pan cx, CXR and change Cipro to Cefepime 2g IV q 8 hours. Continue until count recovery..slight rise in wbc noted after infusion of marrow  7. Aggressive mouth care and skin care as per protocol  8. GVHD prophylaxis- high dose CTX; MMF and Tacrolimus.  9. transfusion and electrolyte support    Patient with hemoglobin drop of ~2  on 6/4, likely due to chemotherapy however will repeat CBC tonight to establish stability, likely will require transfusion.  6/5 describes nausea  6/7 sister collected w/o complication  6/9 toci for haplo storm if t>102.5..will send blood cx on patient...marrow product cx positive for gram pos rods..likely contaminant  6/10 Afebrile, no new complaints.  6/11 Febrile to 100.6F. Cipro switched to cefepime. Infectious work up ordered.  6/12 day 4 ctx today...given toci for haplo storm..some loose bowel, cdiff neg  6/13 mmf, tacro and zarxio.  6/16 overall doing well..on tacro 2 mg..repeat on monday..goal 5 to 10.     Risk Statement (NON-critical care).     On this date of service, level of risk to patient is considered: High.     Due to: Patient has aplastic anemia and is being treated with haplo-SCT, which carries a risk for infection, bleeding, and other life-threatening complications. Today is day + 15 s/p  haplo-identical(sister) bone marrow transplant with  Flu / Cy / TBI preparative regimen for severe aplastic anemia.        Admit to BMTU  2. TLC placement in IR   3. Day -6 - day + 5 - antiemetics   4. Day - 6 start CTX hydration - 1/2NS @ 200ml /hr    5. Strict I&O, daily weights, prn diuresis   6. Day -6 - day -2 - Fludarabine 30mg/m2 IV  7. Day -6 - day - 5 - CTX 14.5mg/kg/day IV. CTX to start after urine SG < 1.010. Mesna  12mg / kg - hemorrhagic cystitis ppx   8. Day -2 - IVIG 0.4 g/kg (ideal body weight) every 3 weeks. Premedication with Tylenol and benadryl  9. Day -1 -  cGy x 1 dose   10. Day - 1 - at 2200 begin transplant hydration : 0.45Ns + 1 amp (50mEq) sodium bicarbonate at 150ml /hr; continue transplant hydration for 24 hours post infusion   11. Day 0 – HPC transplant   12. Day + 2 at 2200 - begin CTX hydration (0.45NS + 10 mEq KCl/ @150ml /m2  continue 24 hours post last dose of CTX   13. Day + 3 - + 4 - CTX 50mg/kg/day IV. Begin CTX when urine SG < 1.010. EKG daily. Mesna for hemorraghic cystitis ppx.   14. Day + 5 - start Zarxio,  MMF and Tacrolimus. Check Tacrolimus levels on Monday and Thursday.   15. Anxiolytics, antinausea, antidiarrhea medications as needed   16. Lasix PRN while being aggressively hydrated to avoid VOD   17. Nutritional support, pain management  Need for prophylactic measures:  1. VOD prophylaxis - low dose heparin gtt (dosed at 100 units / kg / day), glutamine supplementation, Actigall BID   2. PCP prophylaxis - Bactrim DS through day -2    3. Antiviral prophylaxis - Continue Acyclovir   4. Antifungal prophylaxis- Diflucan 400 mg po daily.  5.. GI prophylaxis - Protonix po QD   6. Antibacterial prophylaxis -  ANC < 500, started Cipro 500mg po BID. If becomes febrile, pan cx, CXR and change Cipro to Cefepime 2g IV q 8 hours. Continue until count recovery..slight rise in wbc noted after infusion of marrow  7. Aggressive mouth care and skin care as per protocol  8. GVHD prophylaxis- high dose CTX; MMF and Tacrolimus.  9. transfusion and electrolyte support    Patient with hemoglobin drop of ~2  on 6/4, likely due to chemotherapy however will repeat CBC tonight to establish stability, likely will require transfusion.  6/5 describes nausea  6/7 sister collected w/o complication  6/9 toci for haplo storm if t>102.5..will send blood cx on patient...marrow product cx positive for gram pos rods..likely contaminant  6/10 Afebrile, no new complaints.  6/11 Febrile to 100.6F. Cipro switched to cefepime. Infectious work up ordered.  6/12 day 4 ctx today...given toci for haplo storm..some loose bowel, cdiff neg  6/13 mmf, tacro and zarxio.  6/16 overall doing well..on tacro 2 mg..repeat on monday..goal 5 to 10.   6/21- patient developed significant acute left sided facial pressure/pain in sinus region. During last hospitalization he had same symptoms diagnosed as pansinusitis and treated with Unasyn. Will broaden antibiotic coverage to Zosyn IV on 6/20/23; continue Acyclovir, Diflucan. If worsening or persistent symptoms will order CT of sinuses.  6/23- CT maxillofacial(6/22)- Pansinusitis, severe in the left maxillary and ethmoid sinuses- continue Zosyn IV.    Risk Statement (NON-critical care).     On this date of service, level of risk to patient is considered: High.     Due to: Patient has aplastic anemia and is being treated with haplo-SCT, which carries a risk for infection, bleeding, and other life-threatening complications.

## 2023-06-24 LAB
ALBUMIN SERPL ELPH-MCNC: 4.4 G/DL — SIGNIFICANT CHANGE UP (ref 3.3–5)
ALP SERPL-CCNC: 86 U/L — SIGNIFICANT CHANGE UP (ref 40–120)
ALT FLD-CCNC: 39 U/L — SIGNIFICANT CHANGE UP (ref 10–45)
ANION GAP SERPL CALC-SCNC: 12 MMOL/L — SIGNIFICANT CHANGE UP (ref 5–17)
AST SERPL-CCNC: 15 U/L — SIGNIFICANT CHANGE UP (ref 10–40)
BILIRUB SERPL-MCNC: 0.6 MG/DL — SIGNIFICANT CHANGE UP (ref 0.2–1.2)
BUN SERPL-MCNC: 10 MG/DL — SIGNIFICANT CHANGE UP (ref 7–23)
CALCIUM SERPL-MCNC: 8.9 MG/DL — SIGNIFICANT CHANGE UP (ref 8.4–10.5)
CHLORIDE SERPL-SCNC: 105 MMOL/L — SIGNIFICANT CHANGE UP (ref 96–108)
CO2 SERPL-SCNC: 25 MMOL/L — SIGNIFICANT CHANGE UP (ref 22–31)
CREAT SERPL-MCNC: 0.66 MG/DL — SIGNIFICANT CHANGE UP (ref 0.5–1.3)
EGFR: 125 ML/MIN/1.73M2 — SIGNIFICANT CHANGE UP
GLUCOSE SERPL-MCNC: 101 MG/DL — HIGH (ref 70–99)
HCT VFR BLD CALC: 19.8 % — CRITICAL LOW (ref 39–50)
HGB BLD-MCNC: 7 G/DL — CRITICAL LOW (ref 13–17)
LDH SERPL L TO P-CCNC: 124 U/L — SIGNIFICANT CHANGE UP (ref 50–242)
MAGNESIUM SERPL-MCNC: 1.4 MG/DL — LOW (ref 1.6–2.6)
MAGNESIUM SERPL-MCNC: 2.1 MG/DL — SIGNIFICANT CHANGE UP (ref 1.6–2.6)
MCHC RBC-ENTMCNC: 30.7 PG — SIGNIFICANT CHANGE UP (ref 27–34)
MCHC RBC-ENTMCNC: 35.4 GM/DL — SIGNIFICANT CHANGE UP (ref 32–36)
MCV RBC AUTO: 86.8 FL — SIGNIFICANT CHANGE UP (ref 80–100)
NRBC # BLD: 0 /100 WBCS — SIGNIFICANT CHANGE UP (ref 0–0)
PHOSPHATE SERPL-MCNC: 4 MG/DL — SIGNIFICANT CHANGE UP (ref 2.5–4.5)
PLATELET # BLD AUTO: 8 K/UL — CRITICAL LOW (ref 150–400)
POTASSIUM SERPL-MCNC: 3.8 MMOL/L — SIGNIFICANT CHANGE UP (ref 3.5–5.3)
POTASSIUM SERPL-SCNC: 3.8 MMOL/L — SIGNIFICANT CHANGE UP (ref 3.5–5.3)
PROT SERPL-MCNC: 6.7 G/DL — SIGNIFICANT CHANGE UP (ref 6–8.3)
RBC # BLD: 2.28 M/UL — LOW (ref 4.2–5.8)
RBC # FLD: 12.7 % — SIGNIFICANT CHANGE UP (ref 10.3–14.5)
SODIUM SERPL-SCNC: 142 MMOL/L — SIGNIFICANT CHANGE UP (ref 135–145)
TACROLIMUS SERPL-MCNC: 6.1 NG/ML — SIGNIFICANT CHANGE UP
WBC # BLD: 0.22 K/UL — CRITICAL LOW (ref 3.8–10.5)
WBC # FLD AUTO: 0.22 K/UL — CRITICAL LOW (ref 3.8–10.5)

## 2023-06-24 PROCEDURE — 99233 SBSQ HOSP IP/OBS HIGH 50: CPT

## 2023-06-24 RX ORDER — MAGNESIUM SULFATE 500 MG/ML
2 VIAL (ML) INJECTION ONCE
Refills: 0 | Status: COMPLETED | OUTPATIENT
Start: 2023-06-24 | End: 2023-06-24

## 2023-06-24 RX ORDER — DIPHENHYDRAMINE HCL 50 MG
50 CAPSULE ORAL EVERY 6 HOURS
Refills: 0 | Status: DISCONTINUED | OUTPATIENT
Start: 2023-06-24 | End: 2023-06-30

## 2023-06-24 RX ORDER — HYDROCORTISONE 20 MG
50 TABLET ORAL EVERY 6 HOURS
Refills: 0 | Status: DISCONTINUED | OUTPATIENT
Start: 2023-06-24 | End: 2023-06-30

## 2023-06-24 RX ADMIN — Medication 10 MILLILITER(S): at 12:48

## 2023-06-24 RX ADMIN — OXYMETAZOLINE HYDROCHLORIDE 2 SPRAY(S): 0.5 SPRAY NASAL at 17:41

## 2023-06-24 RX ADMIN — Medication 5 MILLILITER(S): at 12:47

## 2023-06-24 RX ADMIN — FAMOTIDINE 20 MILLIGRAM(S): 10 INJECTION INTRAVENOUS at 17:30

## 2023-06-24 RX ADMIN — OXYCODONE HYDROCHLORIDE 5 MILLIGRAM(S): 5 TABLET ORAL at 00:46

## 2023-06-24 RX ADMIN — MYCOPHENOLATE MOFETIL 1000 MILLIGRAM(S): 250 CAPSULE ORAL at 21:31

## 2023-06-24 RX ADMIN — Medication 25 GRAM(S): at 11:04

## 2023-06-24 RX ADMIN — FLUCONAZOLE 400 MILLIGRAM(S): 150 TABLET ORAL at 12:49

## 2023-06-24 RX ADMIN — Medication 10 MILLILITER(S): at 20:06

## 2023-06-24 RX ADMIN — Medication 10 MILLILITER(S): at 08:29

## 2023-06-24 RX ADMIN — Medication 1 LOZENGE: at 17:31

## 2023-06-24 RX ADMIN — Medication 1 MILLIGRAM(S): at 12:49

## 2023-06-24 RX ADMIN — MYCOPHENOLATE MOFETIL 1000 MILLIGRAM(S): 250 CAPSULE ORAL at 05:47

## 2023-06-24 RX ADMIN — Medication 400 MILLIGRAM(S): at 17:30

## 2023-06-24 RX ADMIN — Medication 1 TABLET(S): at 06:09

## 2023-06-24 RX ADMIN — Medication 1 TABLET(S): at 12:48

## 2023-06-24 RX ADMIN — Medication 650 MILLIGRAM(S): at 12:00

## 2023-06-24 RX ADMIN — Medication 1 TABLET(S): at 06:55

## 2023-06-24 RX ADMIN — FAMOTIDINE 20 MILLIGRAM(S): 10 INJECTION INTRAVENOUS at 05:46

## 2023-06-24 RX ADMIN — Medication 480 MICROGRAM(S): at 13:14

## 2023-06-24 RX ADMIN — Medication 1 LOZENGE: at 08:30

## 2023-06-24 RX ADMIN — Medication 1 LOZENGE: at 12:48

## 2023-06-24 RX ADMIN — LETERMOVIR 480 MILLIGRAM(S): 480 TABLET, FILM COATED ORAL at 12:55

## 2023-06-24 RX ADMIN — PIPERACILLIN AND TAZOBACTAM 25 GRAM(S): 4; .5 INJECTION, POWDER, LYOPHILIZED, FOR SOLUTION INTRAVENOUS at 04:59

## 2023-06-24 RX ADMIN — Medication 1 LOZENGE: at 20:06

## 2023-06-24 RX ADMIN — Medication 1 SPRAY(S): at 05:52

## 2023-06-24 RX ADMIN — Medication 5 MILLILITER(S): at 20:06

## 2023-06-24 RX ADMIN — Medication 5 MILLILITER(S): at 08:30

## 2023-06-24 RX ADMIN — TACROLIMUS 4 MILLIGRAM(S): 5 CAPSULE ORAL at 21:31

## 2023-06-24 RX ADMIN — Medication 10 MILLILITER(S): at 00:46

## 2023-06-24 RX ADMIN — Medication 50 MILLIGRAM(S): at 11:20

## 2023-06-24 RX ADMIN — PIPERACILLIN AND TAZOBACTAM 25 GRAM(S): 4; .5 INJECTION, POWDER, LYOPHILIZED, FOR SOLUTION INTRAVENOUS at 21:32

## 2023-06-24 RX ADMIN — TACROLIMUS 4 MILLIGRAM(S): 5 CAPSULE ORAL at 08:29

## 2023-06-24 RX ADMIN — URSODIOL 300 MILLIGRAM(S): 250 TABLET, FILM COATED ORAL at 17:30

## 2023-06-24 RX ADMIN — Medication 400 MILLIGRAM(S): at 05:46

## 2023-06-24 RX ADMIN — Medication 1 LOZENGE: at 00:46

## 2023-06-24 RX ADMIN — URSODIOL 300 MILLIGRAM(S): 250 TABLET, FILM COATED ORAL at 05:47

## 2023-06-24 RX ADMIN — Medication 10 MILLILITER(S): at 17:31

## 2023-06-24 RX ADMIN — SODIUM CHLORIDE 20 MILLILITER(S): 9 INJECTION INTRAMUSCULAR; INTRAVENOUS; SUBCUTANEOUS at 05:53

## 2023-06-24 RX ADMIN — Medication 10 MILLIGRAM(S): at 05:58

## 2023-06-24 RX ADMIN — HEPARIN SODIUM 4.24 UNIT(S)/HR: 5000 INJECTION INTRAVENOUS; SUBCUTANEOUS at 05:53

## 2023-06-24 RX ADMIN — OXYMETAZOLINE HYDROCHLORIDE 2 SPRAY(S): 0.5 SPRAY NASAL at 05:52

## 2023-06-24 RX ADMIN — Medication 5 MILLILITER(S): at 00:45

## 2023-06-24 RX ADMIN — CHLORHEXIDINE GLUCONATE 1 APPLICATION(S): 213 SOLUTION TOPICAL at 10:21

## 2023-06-24 RX ADMIN — Medication 1 SPRAY(S): at 17:41

## 2023-06-24 RX ADMIN — Medication 650 MILLIGRAM(S): at 11:04

## 2023-06-24 RX ADMIN — MYCOPHENOLATE MOFETIL 1000 MILLIGRAM(S): 250 CAPSULE ORAL at 13:14

## 2023-06-24 RX ADMIN — PIPERACILLIN AND TAZOBACTAM 25 GRAM(S): 4; .5 INJECTION, POWDER, LYOPHILIZED, FOR SOLUTION INTRAVENOUS at 12:48

## 2023-06-24 RX ADMIN — Medication 50 MILLIGRAM(S): at 11:04

## 2023-06-24 RX ADMIN — LORATADINE 10 MILLIGRAM(S): 10 TABLET ORAL at 12:48

## 2023-06-24 RX ADMIN — OXYCODONE HYDROCHLORIDE 5 MILLIGRAM(S): 5 TABLET ORAL at 01:15

## 2023-06-24 RX ADMIN — Medication 5 MILLILITER(S): at 17:30

## 2023-06-24 NOTE — PROGRESS NOTE ADULT - NS ATTEND AMEND GEN_ALL_CORE FT
I attest my time as attending is greater than 50% of the total combined time spent on qualifying patient care activities by the PA/NP and attending.     I have made amendments to the documentation where necessary. 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        Admit to [facility]  2. TLC placement in IR   3. Day -6 - day + 5 - antiemetics   4. Day - 6 start CTX hydration - 1/2NS @ 200ml /hr    5. Strict I&O, daily weights, prn diuresis   6. Day -6 - day -2 - Fludarabine 30mg/m2 IV  7. Day -6 - day - 5 - CTX 14.5mg/kg/day IV. CTX to start after urine SG < 1.010. Mesna  12mg / kg - hemorrhagic cystitis ppx   8. Day -2 - IVIG 0.4 g/kg (ideal body weight) every 3 weeks. Premedication with Tylenol and benadryl  9. Day -1 -  cGy x 1 dose   10. Day - 1 - at 2200 begin transplant hydration : 0.45Ns + 1 amp (50mEq) sodium bicarbonate at 150ml /hr; continue transplant hydration for 24 hours post infusion   11. Day 0 – HPC transplant   12. Day + 2 at 2200 - begin CTX hydration (0.45NS + 10 mEq KCl/ @150ml /m2  continue 24 hours post last dose of CTX   13. Day + 3 - + 4 - CTX 50mg/kg/day IV. Begin CTX when urine SG < 1.010. EKG daily. Mes[place] for hemorraghic cystitis ppx.   14. Day + 5 - start Zarxio,  MMF and Tacrolimus. Check Tacrolimus levels on Monday and Thursday.   15. Anxiolytics, antinausea, antidiarrhea medications as needed   16. Lasix PRN while being aggressively hydrated to avoid VOD   17. Nutritional support, pain management  Need for prophylactic measures:  1. VOD prophylaxis - low dose heparin gtt (dosed at 100 units / kg / day), glutamine supplementation, Actigall BID   2. PCP prophylaxis - Bactrim DS through day -2    3. Antiviral prophylaxis - Continue Acyclovir   4. Antifungal prophylaxis- Diflucan 400 mg po daily.  5.. GI prophylaxis - Protonix po QD   6. Antibacterial prophylaxis -  ANC < 500, started Cipro 500mg po BID. If becomes febrile, pan cx, CXR and change Cipro to Cefepime 2g IV q 8 hours. Continue until count recovery..slight rise in wbc noted after infusion of marrow  7. Aggressive mouth care and skin care as per protocol  8. GVHD prophylaxis- high dose CTX; MMF and Tacrolimus.  9. transfusion and electrolyte support    Patient with hemoglobin drop of ~2  on 6/4, likely due to chemotherapy however will repeat CBC tonight to establish stability, likely will require transfusion.  6/5 describes nausea  6/7 sister collected w/o complication  6/9 toci for haplo storm if t>102.5..will send blood cx on patient...marrow product cx positive for gram pos rods..likely contaminant  6/10 Afebrile, no new complaints.  6/11 Febrile to 100.6F. Cipro switched to cefepime. Infectious work up ordered.  6/12 day 4 ctx today...given toci for haplo storm..some loose bowel, cdiff neg  6/13 mmf, tacro and zarxio.  6/16 overall doing well..on tacro 2 mg..repeat on monday..goal 5 to 10.   6/21- patient developed significant acute left sided facial pressure/pain in sinus region. During last hospitalization he had same symptoms diagnosed as pansinusitis and treated with Unasyn. Will broaden antibiotic coverage to Zosyn IV on 6/20/23; continue Acyclovir, Diflucan. If worsening or persistent symptoms will order CT of sinuses.  6/23- CT maxillofacial(6/22)- Pansinusitis, severe in the left maxillary and ethmoid sinuses- continue Zosyn IV.    Risk Statement (NON-critical care).     On this date of service, level of risk to patient is considered: High.     Due to: Patient has aplastic anemia and is being treated with haplo-SCT, which carries a risk for infection, bleeding, and other life-threatening complications.

## 2023-06-24 NOTE — PROGRESS NOTE ADULT - SUBJECTIVE AND OBJECTIVE BOX
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 () with Flu / Cy / TBI prep regimen for treatment of severe aplastic anemia    S: Patient seen and examined with HPC Transplant Team:   + eye pain/pressure overnight  + general fatigue   + sinus pressure/pain, nasal congestion, post nasal drip induced cough  + decreased appetite      O: Vitals:   Vital Signs Last 24 Hrs  T(C): 37 (2023 00:40), Max: 37.4 (2023 17:16)  T(F): 98.6 (2023 00:40), Max: 99.3 (2023 17:16)  HR: 118 (2023 00:40) (109 - 118)  BP: 141/97 (2023 00:40) (135/83 - 142/94)  BP(mean): --  RR: 18 (2023 00:40) (18 - 18)  SpO2: 98% (2023 00:40) (96% - 99%)    Parameters below as of 2023 00:40  Patient On (Oxygen Delivery Method): room air        Admit weight:   Daily     Daily Weight in k.5 (2023 10:02)    Intake / Output:    @ 07:01  -   @ 07:00  --------------------------------------------------------  IN: 1752.4 mL / OUT: 1370 mL / NET: 382.4 mL          PE:     Oropharynx: no erythema or ulcers  Oral Mucositis: (-)                                                        Grade: -  CVS: RRR, +S1,S2  Lungs: CTA B/L  Abdomen: Soft, NT, ND, +BS  Extremities: No edema in BLE  Gastric Mucositis: (-)                                                 Grade: -  Intestinal Mucositis:   (-)                                           Grade: -  Skin: no rash   TLC: CDI  Neuro: Alert, O x 3  Pain: De        Labs:   CBC Full  -  ( 2023 07:09 )  WBC Count : 0.09 K/uL  Hemoglobin : 7.6 g/dL  Hematocrit : 21.0 %  Platelet Count - Automated : 13 K/uL  Mean Cell Volume : 85.4 fl  Mean Cell Hemoglobin : 30.9 pg  Mean Cell Hemoglobin Concentration : 36.2 gm/dL  Auto Neutrophil # : x  Auto Lymphocyte # : x  Auto Monocyte # : x  Auto Eosinophil # : x  Auto Basophil # : x  Auto Neutrophil % : x  Auto Lymphocyte % : x  Auto Monocyte % : x  Auto Eosinophil % : x  Auto Basophil % : x                          7.6    0.09  )-----------( 13       ( 2023 07:09 )             21.0         142  |  105  |  10  ----------------------------<  101<H>  3.8   |  25  |  0.66    Ca    8.9      2023 07:03  Phos  4.0       Mg     1.4         TPro  6.7  /  Alb  4.4  /  TBili  0.6  /  DBili  x   /  AST  15  /  ALT  39  /  AlkPhos  86        LIVER FUNCTIONS - ( 2023 07:03 )  Alb: 4.4 g/dL / Pro: 6.7 g/dL / ALK PHOS: 86 U/L / ALT: 39 U/L / AST: 15 U/L / GGT: x           Lactate Dehydrogenase, Serum: 124 U/L ( @ 07:03)          Karnofsky / Lansky Scale:   GVHD:   Skin:   Liver:   Gut:   Overall Grade:       Cultures:         Radiology:       Meds:   Antimicrobials:   acyclovir   Oral Tab/Cap 400 milliGRAM(s) Oral every 12 hours  clotrimazole Lozenge 1 Lozenge Oral five times a day  fluconAZOLE   Tablet 400 milliGRAM(s) Oral daily  letermovir 480 milliGRAM(s) Oral daily  piperacillin/tazobactam IVPB.. 4.5 Gram(s) IV Intermittent every 8 hours      Heme / Onc:   heparin  Infusion 424 Unit(s)/Hr IV Continuous <Continuous>      GI:  famotidine    Tablet 20 milliGRAM(s) Oral two times a day  loperamide 2 milliGRAM(s) Oral every 4 hours PRN  senna 1 Tablet(s) Oral at bedtime PRN  sodium bicarbonate Mouth Rinse 10 milliLiter(s) Swish and Spit five times a day  ursodiol Capsule 300 milliGRAM(s) Oral two times a day      Cardiovascular:       Immunologic:   filgrastim-sndz (ZARXIO) Injectable (Chemo) 480 MICROGram(s) SubCutaneous every 24 hours  mycophenolate mofetil 1000 milliGRAM(s) Oral three times a day  tacrolimus 4 milliGRAM(s) Oral <User Schedule>      Other medications:   acetaminophen     Tablet .. 650 milliGRAM(s) Oral every 6 hours  Biotene Dry Mouth Oral Rinse 5 milliLiter(s) Swish and Spit five times a day  Chemotherapy Worklist Order      chlorhexidine 4% Liquid 1 Application(s) Topical <User Schedule>  folic acid 1 milliGRAM(s) Oral daily  hydrocortisone sodium succinate Injectable 50 milliGRAM(s) IV Push every 24 hours  Infuse HPC Product      lidocaine/prilocaine Cream (Chemo) 1 Application(s) Topical daily  loratadine 10 milliGRAM(s) Oral daily  multivitamin 1 Tablet(s) Oral daily  oxymetazoline 0.05% Nasal Spray 2 Spray(s) Both Nostrils two times a day  sodium bicarbonate  Infusion (Chemo) 0.074 mEq/kG/Hr IV Continuous <Continuous>  sodium chloride 0.65% Nasal 1 Spray(s) Both Nostrils two times a day  sodium chloride 0.9% 1000 milliLiter(s) IV Continuous <Continuous>  sodium chloride 0.9%. 1000 milliLiter(s) IV Continuous <Continuous>  sodium chloride 0.9%. (Chemo) 1000 milliLiter(s) IV Continuous <Continuous>      PRN:   acetaminophen     Tablet .. 650 milliGRAM(s) Oral every 6 hours PRN  acetaminophen 325 mG/butalbital 50 mG/caffeine 40 mG 1 Tablet(s) Oral every 6 hours PRN  dibucaine 1% Ointment 1 Application(s) Topical two times a day PRN  guaiFENesin  milliGRAM(s) Oral every 12 hours PRN  hemorrhoidal Ointment 1 Application(s) Rectal three times a day PRN  loperamide 2 milliGRAM(s) Oral every 4 hours PRN  oxyCODONE    IR 5 milliGRAM(s) Oral every 4 hours PRN  prochlorperazine   Injectable 10 milliGRAM(s) IV Push every 6 hours PRN  pseudoephedrine 30 milliGRAM(s) Oral every 6 hours PRN  senna 1 Tablet(s) Oral at bedtime PRN  sodium chloride 0.9% lock flush 10 milliLiter(s) IV Push every 1 hour PRN  witch hazel Pads 1 Application(s) Topical four times a day PRN      A/P:     37 year old male with severe aplastic anemia with multiple lines of treatment admitted for a haplo-identical bone marrow transplant with  Flu / Cy / TBI preparative regimen   Day + 14  start CTX hydration - 1/2NS @ 200ml /hr    Strict I&O, daily weights, prn diuresis   Day -6 - day -2 - Fludarabine 30mg/m2 IV  Day -6 - day - 5 - CTX 14.5mg/kg/day IV. CTX to start after urine SG < 1.010. Mesna  12mg / kg - hemorrhagic cystitis ppx   Day -2 - IVIG 0.4 g/kg (ideal body weight) every 3 weeks. Premedication with Tylenol and benadryl   Day -1 -  cGy x 1 dose   Day - 1 - at 2200 begin transplant hydration : 0.45Ns + 1 amp (50mEq) sodium bicarbonate at 150ml /hr; continue transplant hydration for 24 hours post infusion    Day + 2 at 2200 - begin CTX hydration (0.45NS + 10 mEq KCl/ @150ml /m2  continue 24 hours post last dose of CTX   Day + 3 - + 4 - CTX 50mg/kg/day IV. Begin CTX when urine SG < 1.010. EKG daily. Mesna for hemorraghic cystitis ppx.   Day + 5 - start Zarxio,  MMF and Tacrolimus. Check Tacrolimus levels on Monday and Thursday.   when ANC < 500, start Cipro 500mg po BID. If becomes febrile, pan cx, CXR and change Cipro to Cefepime 2g IV q 8 hours. Continue until count recovery  - Fludarabine , intermittent nausea, continue antiemetics   - Fludarabine , IVIG   TBI   - HPC transplant today. Continue transplant hydration for 24 hours post infusion of bone marrow   - monitor for CRS / haplo storm - if T >/= 102.5 on days +1 or +2, would dose tocilizumab 8mg / kg IVPB x 1    C.Diff negative, started imodium  6/12- post transplant CTX 2.2 - continue CTX hydration for 24 hours post infusion of last dose , Tocilizumab x1 dose O/N for temp of 39.1.   6/22 eye pain overnight, no visual acuity deficits. Opthalmology evaluating - appreciated - optic disc is sharp/pink, overall suggesting against a diagnosis of optic neuritis. Follow up CT maxillofacial/orbits -     1. Infectious Disease:   acyclovir   Oral Tab/Cap 400 milliGRAM(s) Oral every 8 hours  clotrimazole Lozenge 1 Lozenge Oral five times a day  fluconAZOLE   Tablet 400 milliGRAM(s) Oral daily  piperacillin/tazobactam IVPB.. 4.5 Gram(s) IV Intermittent every 8 hours    2. VOD Prophylaxis: Actigall, Glutamine, Heparin (dosed at 100 units / kg / day)     3. GI Prophylaxis:    famotidine    Tablet 20 milliGRAM(s) Oral two times a day    4. Mouthcare - NS / NaHCO3 rinses, Mycelex, Biotene; Skin care     5. GVHD prophylaxis   TBI day -1   CTX days +3, +4   mycophenolate mofetil 1000 milliGRAM(s) Oral three times a day  tacrolimus 3 milliGRAM(s) Oral <User Schedule>    6. Transfuse & replete electrolytes prn   1 unit PRBC   1 bag platelets     7. IV hydration, daily weights, strict I&O, prn diuresis     8. PO intake as tolerated, nutrition follow up as needed, MVI, folic acid     9. Antiemetics, anti-diarrhea medications:   loperamide 2 milliGRAM(s) Oral every 4 hours PRN  prochlorperazine   Injectable 10 milliGRAM(s) IV Push every 6 hours PRN    10. OOB as tolerated, physical therapy consult if needed     11. Monitor coags / fibrinogen 2x week, vitamin K as needed     12. Monitor closely for clinical changes, monitor for fevers     13. Emotional support provided, plan of care discussed and questions addressed     14. Patient education done regarding plan of care, restrictions and discharge planning     15. Continue regular social work input     I have written the above note for Dr. Lima who performed service with me in the room.   Buck Sosa  NP-C ([phone])    I have seen and examined patient with NP, I agree with above note as scribed.                      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 () with Flu / Cy / TBI prep regimen for treatment of severe aplastic anemia    S: Patient seen and examined with HPC Transplant Team:   + eye pain/pressure overnight  + general fatigue   + sinus pressure/pain, nasal congestion, post nasal drip induced cough  + decreased appetite      O: Vitals:   Vital Signs Last 24 Hrs  T(C): 37 (2023 00:40), Max: 37.4 (2023 17:16)  T(F): 98.6 (2023 00:40), Max: 99.3 (2023 17:16)  HR: 118 (2023 00:40) (109 - 118)  BP: 141/97 (2023 00:40) (135/83 - 142/94)  BP(mean): --  RR: 18 (2023 00:40) (18 - 18)  SpO2: 98% (2023 00:40) (96% - 99%)    Parameters below as of 2023 00:40  Patient On (Oxygen Delivery Method): room air        Admit weight:   Daily     Daily Weight in k.5 (2023 10:02)    Intake / Output:    @ 07:01  -   @ 07:00  --------------------------------------------------------  IN: 1752.4 mL / OUT: 1370 mL / NET: 382.4 mL          PE:     Oropharynx: no erythema or ulcers  Oral Mucositis: (-)                                                        Grade: -  CVS: RRR, +S1,S2  Lungs: CTA B/L  Abdomen: Soft, NT, ND, +BS  Extremities: No edema in BLE  Gastric Mucositis: (-)                                                 Grade: -  Intestinal Mucositis:   (-)                                           Grade: -  Skin: no rash   TLC: CDI  Neuro: Alert, O x 3  Pain: De        Labs:   CBC Full  -  ( 2023 07:09 )  WBC Count : 0.09 K/uL  Hemoglobin : 7.6 g/dL  Hematocrit : 21.0 %  Platelet Count - Automated : 13 K/uL  Mean Cell Volume : 85.4 fl  Mean Cell Hemoglobin : 30.9 pg  Mean Cell Hemoglobin Concentration : 36.2 gm/dL  Auto Neutrophil # : x  Auto Lymphocyte # : x  Auto Monocyte # : x  Auto Eosinophil # : x  Auto Basophil # : x  Auto Neutrophil % : x  Auto Lymphocyte % : x  Auto Monocyte % : x  Auto Eosinophil % : x  Auto Basophil % : x                          7.6    0.09  )-----------( 13       ( 2023 07:09 )             21.0         142  |  105  |  10  ----------------------------<  101<H>  3.8   |  25  |  0.66    Ca    8.9      2023 07:03  Phos  4.0       Mg     1.4         TPro  6.7  /  Alb  4.4  /  TBili  0.6  /  DBili  x   /  AST  15  /  ALT  39  /  AlkPhos  86        LIVER FUNCTIONS - ( 2023 07:03 )  Alb: 4.4 g/dL / Pro: 6.7 g/dL / ALK PHOS: 86 U/L / ALT: 39 U/L / AST: 15 U/L / GGT: x           Lactate Dehydrogenase, Serum: 124 U/L ( @ 07:03)          Karnofsky / Lansky Scale:   GVHD:   Skin:   Liver:   Gut:   Overall Grade:       Cultures:         Radiology:       Meds:   Antimicrobials:   acyclovir   Oral Tab/Cap 400 milliGRAM(s) Oral every 12 hours  clotrimazole Lozenge 1 Lozenge Oral five times a day  fluconAZOLE   Tablet 400 milliGRAM(s) Oral daily  letermovir 480 milliGRAM(s) Oral daily  piperacillin/tazobactam IVPB.. 4.5 Gram(s) IV Intermittent every 8 hours      Heme / Onc:   heparin  Infusion 424 Unit(s)/Hr IV Continuous <Continuous>      GI:  famotidine    Tablet 20 milliGRAM(s) Oral two times a day  loperamide 2 milliGRAM(s) Oral every 4 hours PRN  senna 1 Tablet(s) Oral at bedtime PRN  sodium bicarbonate Mouth Rinse 10 milliLiter(s) Swish and Spit five times a day  ursodiol Capsule 300 milliGRAM(s) Oral two times a day      Cardiovascular:       Immunologic:   filgrastim-sndz (ZARXIO) Injectable (Chemo) 480 MICROGram(s) SubCutaneous every 24 hours  mycophenolate mofetil 1000 milliGRAM(s) Oral three times a day  tacrolimus 4 milliGRAM(s) Oral <User Schedule>      Other medications:   acetaminophen     Tablet .. 650 milliGRAM(s) Oral every 6 hours  Biotene Dry Mouth Oral Rinse 5 milliLiter(s) Swish and Spit five times a day  Chemotherapy Worklist Order      chlorhexidine 4% Liquid 1 Application(s) Topical <User Schedule>  folic acid 1 milliGRAM(s) Oral daily  hydrocortisone sodium succinate Injectable 50 milliGRAM(s) IV Push every 24 hours  Infuse HPC Product      lidocaine/prilocaine Cream (Chemo) 1 Application(s) Topical daily  loratadine 10 milliGRAM(s) Oral daily  multivitamin 1 Tablet(s) Oral daily  oxymetazoline 0.05% Nasal Spray 2 Spray(s) Both Nostrils two times a day  sodium bicarbonate  Infusion (Chemo) 0.074 mEq/kG/Hr IV Continuous <Continuous>  sodium chloride 0.65% Nasal 1 Spray(s) Both Nostrils two times a day  sodium chloride 0.9% 1000 milliLiter(s) IV Continuous <Continuous>  sodium chloride 0.9%. 1000 milliLiter(s) IV Continuous <Continuous>  sodium chloride 0.9%. (Chemo) 1000 milliLiter(s) IV Continuous <Continuous>      PRN:   acetaminophen     Tablet .. 650 milliGRAM(s) Oral every 6 hours PRN  acetaminophen 325 mG/butalbital 50 mG/caffeine 40 mG 1 Tablet(s) Oral every 6 hours PRN  dibucaine 1% Ointment 1 Application(s) Topical two times a day PRN  guaiFENesin  milliGRAM(s) Oral every 12 hours PRN  hemorrhoidal Ointment 1 Application(s) Rectal three times a day PRN  loperamide 2 milliGRAM(s) Oral every 4 hours PRN  oxyCODONE    IR 5 milliGRAM(s) Oral every 4 hours PRN  prochlorperazine   Injectable 10 milliGRAM(s) IV Push every 6 hours PRN  pseudoephedrine 30 milliGRAM(s) Oral every 6 hours PRN  senna 1 Tablet(s) Oral at bedtime PRN  sodium chloride 0.9% lock flush 10 milliLiter(s) IV Push every 1 hour PRN  witch hazel Pads 1 Application(s) Topical four times a day PRN      A/P:     37 year old male with severe aplastic anemia with multiple lines of treatment admitted for a haplo-identical bone marrow transplant with  Flu / Cy / TBI preparative regimen   Day + 16  start CTX hydration - 1/2NS @ 200ml /hr    Strict I&O, daily weights, prn diuresis   Day -6 - day -2 - Fludarabine 30mg/m2 IV  Day -6 - day - 5 - CTX 14.5mg/kg/day IV. CTX to start after urine SG < 1.010. Mesna  12mg / kg - hemorrhagic cystitis ppx   Day -2 - IVIG 0.4 g/kg (ideal body weight) every 3 weeks. Premedication with Tylenol and benadryl   Day -1 -  cGy x 1 dose   Day - 1 - at 2200 begin transplant hydration : 0.45Ns + 1 amp (50mEq) sodium bicarbonate at 150ml /hr; continue transplant hydration for 24 hours post infusion    Day + 2 at 2200 - begin CTX hydration (0.45NS + 10 mEq KCl/ @150ml /m2  continue 24 hours post last dose of CTX   Day + 3 - + 4 - CTX 50mg/kg/day IV. Begin CTX when urine SG < 1.010. EKG daily. Mesna for hemorraghic cystitis ppx.   Day + 5 - start Zarxio,  MMF and Tacrolimus. Check Tacrolimus levels on Monday and Thursday.   when ANC < 500, start Cipro 500mg po BID. If becomes febrile, pan cx, CXR and change Cipro to Cefepime 2g IV q 8 hours. Continue until count recovery  - Fludarabine , intermittent nausea, continue antiemetics   - Fludarabine , IVIG   TBI   - HPC transplant today. Continue transplant hydration for 24 hours post infusion of bone marrow   - monitor for CRS / haplo storm - if T >/= 102.5 on days +1 or +2, would dose tocilizumab 8mg / kg IVPB x 1    C.Diff negative, started imodium  6/12- post transplant CTX 2.2 - continue CTX hydration for 24 hours post infusion of last dose , Tocilizumab x1 dose O/N for temp of 39.1.   6/22 eye pain overnight, no visual acuity deficits. Opthalmology evaluating - appreciated - optic disc is sharp/pink, overall suggesting against a diagnosis of optic neuritis. Follow up CT maxillofacial/orbits -     1. Infectious Disease:   acyclovir   Oral Tab/Cap 400 milliGRAM(s) Oral every 8 hours  clotrimazole Lozenge 1 Lozenge Oral five times a day  fluconAZOLE   Tablet 400 milliGRAM(s) Oral daily  piperacillin/tazobactam IVPB.. 4.5 Gram(s) IV Intermittent every 8 hours    2. VOD Prophylaxis: Actigall, Glutamine, Heparin (dosed at 100 units / kg / day)     3. GI Prophylaxis:    famotidine    Tablet 20 milliGRAM(s) Oral two times a day    4. Mouthcare - NS / NaHCO3 rinses, Mycelex, Biotene; Skin care     5. GVHD prophylaxis   TBI day -1   CTX days +3, +4   mycophenolate mofetil 1000 milliGRAM(s) Oral three times a day  tacrolimus 3 milliGRAM(s) Oral <User Schedule>    6. Transfuse & replete electrolytes prn   1 unit PRBC   1 bag platelets     7. IV hydration, daily weights, strict I&O, prn diuresis     8. PO intake as tolerated, nutrition follow up as needed, MVI, folic acid     9. Antiemetics, anti-diarrhea medications:   loperamide 2 milliGRAM(s) Oral every 4 hours PRN  prochlorperazine   Injectable 10 milliGRAM(s) IV Push every 6 hours PRN    10. OOB as tolerated, physical therapy consult if needed     11. Monitor coags / fibrinogen 2x week, vitamin K as needed     12. Monitor closely for clinical changes, monitor for fevers     13. Emotional support provided, plan of care discussed and questions addressed     14. Patient education done regarding plan of care, restrictions and discharge planning     15. Continue regular social work input     I have written the above note for Dr. Lima who performed service with me in the room.   Buck Sosa  NP-C ([phone])    I have seen and examined patient with NP, I agree with above note as scribed.                      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) with Flu / Cy / TBI prep regimen for treatment of severe aplastic anemia    S: Patient seen and examined with HPC Transplant Team:   + eye pain/pressure in left eye  + internal gaze left eye  + sinus pain, congesion  + general fatigue   + poor appetite    O: Vitals:   Vital Signs Last 24 Hrs  T(C): 37 (24 Jun 2023 00:40), Max: 37.4 (23 Jun 2023 17:16)  T(F): 98.6 (24 Jun 2023 00:40), Max: 99.3 (23 Jun 2023 17:16)  HR: 118 (24 Jun 2023 00:40) (109 - 118)  BP: 141/97 (24 Jun 2023 00:40) (135/83 - 142/94)  BP(mean): --  RR: 18 (24 Jun 2023 00:40) (18 - 18)  SpO2: 98% (24 Jun 2023 00:40) (96% - 99%)    Parameters below as of 24 Jun 2023 00:40  Patient On (Oxygen Delivery Method): room air    Admit weight: 102.8 kg   Daily weight: 95.4 kg     Intake / Output:   06-23 @ 07:01  -  06-24 @ 07:00  --------------------------------------------------------  IN: 1752.4 mL / OUT: 1370 mL / NET: 382.4 mL    PE:   Oropharynx: no erythema or ulcers  ENT: tenderness + in maxillary, ethmoidal sinus area  Oral Mucositis: (-)                                                        Grade: -  CVS: RRR, +S1,S2  Lungs: CTA B/L  Abdomen: Soft, NT, ND, +BS  Extremities: No edema in BLE  Gastric Mucositis: (-)                                                 Grade: -  Intestinal Mucositis:   (-)                                           Grade: -  Skin: no rash   TLC: CDI  Neuro: Alert, O x 3  Pain: Denies    Labs:   LABS:    Blood Cultures:                           7.0    0.22  )-----------( 8        ( 24 Jun 2023 07:04 )             19.8         Mean Cell Volume : 86.8 fl  Mean Cell Hemoglobin : 30.7 pg  Mean Cell Hemoglobin Concentration : 35.4 gm/dL  Auto Neutrophil # : x  Auto Lymphocyte # : x  Auto Monocyte # : x  Auto Eosinophil # : x  Auto Basophil # : x  Auto Neutrophil % : x  Auto Lymphocyte % : x  Auto Monocyte % : x  Auto Eosinophil % : x  Auto Basophil % : x      06-24    142  |  105  |  10  ----------------------------<  101<H>  3.8   |  25  |  0.66    Ca    8.9      24 Jun 2023 07:03  Phos  4.0     06-24  Mg     2.1     06-24    TPro  6.7  /  Alb  4.4  /  TBili  0.6  /  DBili  x   /  AST  15  /  ALT  39  /  AlkPhos  86  06-24    Mg 2.1  Phos --  Mg 1.4  Phos 4.0    Uric Acid --    Cultures:   Culture - Blood (06.10.23 @ 21:45)    Specimen Source: .Blood Blood-Catheter   Culture Results:   No Growth Final    Radiology:    CT Maxillofacial w/ IV Cont (06.22.23 @ 16:48) >  Pansinusitis, severe in the left maxillary and ethmoid sinuses, worsened   since comparison study.    Meds:   Antimicrobials:   acyclovir   Oral Tab/Cap 400 milliGRAM(s) Oral every 12 hours  clotrimazole Lozenge 1 Lozenge Oral five times a day  fluconAZOLE   Tablet 400 milliGRAM(s) Oral daily  letermovir 480 milliGRAM(s) Oral daily  piperacillin/tazobactam IVPB.. 4.5 Gram(s) IV Intermittent every 8 hours    Heme / Onc:   heparin  Infusion 424 Unit(s)/Hr IV Continuous <Continuous>    GI:  famotidine    Tablet 20 milliGRAM(s) Oral two times a day  loperamide 2 milliGRAM(s) Oral every 4 hours PRN  senna 1 Tablet(s) Oral at bedtime PRN  sodium bicarbonate Mouth Rinse 10 milliLiter(s) Swish and Spit five times a day  ursodiol Capsule 300 milliGRAM(s) Oral two times a day    Immunologic:   filgrastim-sndz (ZARXIO) Injectable (Chemo) 480 MICROGram(s) SubCutaneous every 24 hours  mycophenolate mofetil 1000 milliGRAM(s) Oral three times a day  tacrolimus 4 milliGRAM(s) Oral <User Schedule>    Other medications:   acetaminophen     Tablet .. 650 milliGRAM(s) Oral every 6 hours  Biotene Dry Mouth Oral Rinse 5 milliLiter(s) Swish and Spit five times a day  Chemotherapy Worklist Order      chlorhexidine 4% Liquid 1 Application(s) Topical <User Schedule>  folic acid 1 milliGRAM(s) Oral daily  hydrocortisone sodium succinate Injectable 50 milliGRAM(s) IV Push every 24 hours  Infuse HPC Product      lidocaine/prilocaine Cream (Chemo) 1 Application(s) Topical daily  loratadine 10 milliGRAM(s) Oral daily  multivitamin 1 Tablet(s) Oral daily  oxymetazoline 0.05% Nasal Spray 2 Spray(s) Both Nostrils two times a day  sodium bicarbonate  Infusion (Chemo) 0.074 mEq/kG/Hr IV Continuous <Continuous>  sodium chloride 0.65% Nasal 1 Spray(s) Both Nostrils two times a day  sodium chloride 0.9% 1000 milliLiter(s) IV Continuous <Continuous>  sodium chloride 0.9%. 1000 milliLiter(s) IV Continuous <Continuous>  sodium chloride 0.9%. (Chemo) 1000 milliLiter(s) IV Continuous <Continuous>    PRN:   acetaminophen     Tablet .. 650 milliGRAM(s) Oral every 6 hours PRN  acetaminophen 325 mG/butalbital 50 mG/caffeine 40 mG 1 Tablet(s) Oral every 6 hours PRN  dibucaine 1% Ointment 1 Application(s) Topical two times a day PRN  guaiFENesin  milliGRAM(s) Oral every 12 hours PRN  hemorrhoidal Ointment 1 Application(s) Rectal three times a day PRN  loperamide 2 milliGRAM(s) Oral every 4 hours PRN  oxyCODONE    IR 5 milliGRAM(s) Oral every 4 hours PRN  prochlorperazine   Injectable 10 milliGRAM(s) IV Push every 6 hours PRN  pseudoephedrine 30 milliGRAM(s) Oral every 6 hours PRN  senna 1 Tablet(s) Oral at bedtime PRN  sodium chloride 0.9% lock flush 10 milliLiter(s) IV Push every 1 hour PRN  witch hazel Pads 1 Application(s) Topical four times a day PRN    A/P:     37 year old male with severe aplastic anemia with multiple lines of treatment admitted for a haplo-identical bone marrow transplant with  Flu / Cy / TBI preparative regimen   Day + 16  start CTX hydration - 1/2NS @ 200ml /hr    Strict I&O, daily weights, prn diuresis   Day -6 - day -2 - Fludarabine 30mg/m2 IV  Day -6 - day - 5 - CTX 14.5mg/kg/day IV. CTX to start after urine SG < 1.010. Mesna  12mg / kg - hemorrhagic cystitis ppx   Day -2 - IVIG 0.4 g/kg (ideal body weight) every 3 weeks. Premedication with Tylenol and benadryl   Day -1 -  cGy x 1 dose   Day - 1 - at 2200 begin transplant hydration : 0.45Ns + 1 amp (50mEq) sodium bicarbonate at 150ml /hr; continue transplant hydration for 24 hours post infusion    Day + 2 at 2200 - begin CTX hydration (0.45NS + 10 mEq KCl/ @150ml /m2  continue 24 hours post last dose of CTX   Day + 3 - + 4 - CTX 50mg/kg/day IV. Begin CTX when urine SG < 1.010. EKG daily. Mesna for hemorraghic cystitis ppx.   Day + 5 - start Zarxio,  MMF and Tacrolimus. Check Tacrolimus levels on Monday and Thursday.   when ANC < 500, start Cipro 500mg po BID. If becomes febrile, pan cx, CXR and change Cipro to Cefepime 2g IV q 8 hours. Continue until count recovery  6/5- Fludarabine 4/5, intermittent nausea, continue antiemetics   6/6- Fludarabine 5/5, IVIG  6/7 TBI   6/8- HPC transplant today. Continue transplant hydration for 24 hours post infusion of bone marrow   6/9- monitor for CRS / haplo storm - if T >/= 102.5 on days +1 or +2, would dose tocilizumab 8mg / kg IVPB x 1   6/11 C.Diff negative, started imodium  6/12- post transplant CTX 2.2 - continue CTX hydration for 24 hours post infusion of last dose , Tocilizumab x1 dose O/N for temp of 39.1.   6/22 eye pain overnight, no visual acuity deficits. Opthalmology evaluating - appreciated - optic disc is sharp/pink, overall suggesting against a diagnosis of optic neuritis. Follow up CT maxillofacial/orbits -     1. Infectious Disease:   acyclovir   Oral Tab/Cap 400 milliGRAM(s) Oral every 12 hours  clotrimazole Lozenge 1 Lozenge Oral five times a day  fluconAZOLE   Tablet 400 milliGRAM(s) Oral daily  letermovir 480 milliGRAM(s) Oral daily  piperacillin/tazobactam IVPB.. 4.5 Gram(s) IV Intermittent every 8 hours    2. VOD Prophylaxis: Actigall, Glutamine, Heparin (dosed at 100 units / kg / day)     3. GI Prophylaxis:    famotidine    Tablet 20 milliGRAM(s) Oral two times a day    4. Mouthcare - NS / NaHCO3 rinses, Mycelex, Biotene; Skin care     5. GVHD prophylaxis   TBI day -1   CTX days +3, +4   mycophenolate mofetil 1000 milliGRAM(s) Oral three times a day  tacrolimus 3 milliGRAM(s) Oral <User Schedule>    6. Transfuse & replete electrolytes prn   Transfuse one unit platelets today.  6/24- Hypomagnesemia- Replete magnesium.     7. IV hydration, daily weights, strict I&O, prn diuresis     8. PO intake as tolerated, nutrition follow up as needed, MVI, folic acid     9. Antiemetics, anti-diarrhea medications:   loperamide 2 milliGRAM(s) Oral every 4 hours PRN  prochlorperazine   Injectable 10 milliGRAM(s) IV Push every 6 hours PRN    10. OOB as tolerated, physical therapy consult if needed     11. Monitor coags / fibrinogen 2x week, vitamin K as needed     12. Monitor closely for clinical changes, monitor for fevers     13. Emotional support provided, plan of care discussed and questions addressed     14. Patient education done regarding plan of care, restrictions and discharge planning     15. Continue regular social work input     I have written the above note for Dr. Gleason who performed service with me in the room.   Buck Sosa NP-C ([phone])    I have seen and examined patient with NP, I agree with above note as scribed.                      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) with Flu / Cy / TBI prep regimen for treatment of severe aplastic anemia    S: Patient seen and examined with HPC Transplant Team:   + eye pain/pressure in left eye  + internal right gaze pain  + sinus pain, congestion  + general fatigue   + poor appetite    O: Vitals:   Vital Signs Last 24 Hrs  T(C): 37 (24 Jun 2023 00:40), Max: 37.4 (23 Jun 2023 17:16)  T(F): 98.6 (24 Jun 2023 00:40), Max: 99.3 (23 Jun 2023 17:16)  HR: 118 (24 Jun 2023 00:40) (109 - 118)  BP: 141/97 (24 Jun 2023 00:40) (135/83 - 142/94)  BP(mean): --  RR: 18 (24 Jun 2023 00:40) (18 - 18)  SpO2: 98% (24 Jun 2023 00:40) (96% - 99%)    Parameters below as of 24 Jun 2023 00:40  Patient On (Oxygen Delivery Method): room air    Admit weight: 102.8 kg   Daily weight: 95.4 kg     Intake / Output:   06-23 @ 07:01  -  06-24 @ 07:00  --------------------------------------------------------  IN: 1752.4 mL / OUT: 1370 mL / NET: 382.4 mL    PE:   Oropharynx: no erythema or ulcers  ENT: tenderness + in maxillary, ethmoidal sinus area  Oral Mucositis: (-)                                                        Grade: -  CVS: RRR, +S1,S2  Lungs: CTA B/L  Abdomen: Soft, NT, ND, +BS  Extremities: No edema in BLE  Gastric Mucositis: (-)                                                 Grade: -  Intestinal Mucositis:   (-)                                           Grade: -  Skin: no rash   TLC: CDI  Neuro: Alert, O x 3  Pain: Denies    Labs:   LABS:    Blood Cultures:                           7.0    0.22  )-----------( 8        ( 24 Jun 2023 07:04 )             19.8         Mean Cell Volume : 86.8 fl  Mean Cell Hemoglobin : 30.7 pg  Mean Cell Hemoglobin Concentration : 35.4 gm/dL  Auto Neutrophil # : x  Auto Lymphocyte # : x  Auto Monocyte # : x  Auto Eosinophil # : x  Auto Basophil # : x  Auto Neutrophil % : x  Auto Lymphocyte % : x  Auto Monocyte % : x  Auto Eosinophil % : x  Auto Basophil % : x      06-24    142  |  105  |  10  ----------------------------<  101<H>  3.8   |  25  |  0.66    Ca    8.9      24 Jun 2023 07:03  Phos  4.0     06-24  Mg     2.1     06-24    TPro  6.7  /  Alb  4.4  /  TBili  0.6  /  DBili  x   /  AST  15  /  ALT  39  /  AlkPhos  86  06-24    Mg 2.1  Phos --  Mg 1.4  Phos 4.0    Uric Acid --    Cultures:   Culture - Blood (06.10.23 @ 21:45)    Specimen Source: .Blood Blood-Catheter   Culture Results:   No Growth Final    Radiology:    CT Maxillofacial w/ IV Cont (06.22.23 @ 16:48) >  Pansinusitis, severe in the left maxillary and ethmoid sinuses, worsened   since comparison study.    Meds:   Antimicrobials:   acyclovir   Oral Tab/Cap 400 milliGRAM(s) Oral every 12 hours  clotrimazole Lozenge 1 Lozenge Oral five times a day  fluconAZOLE   Tablet 400 milliGRAM(s) Oral daily  letermovir 480 milliGRAM(s) Oral daily  piperacillin/tazobactam IVPB.. 4.5 Gram(s) IV Intermittent every 8 hours    Heme / Onc:   heparin  Infusion 424 Unit(s)/Hr IV Continuous <Continuous>    GI:  famotidine    Tablet 20 milliGRAM(s) Oral two times a day  loperamide 2 milliGRAM(s) Oral every 4 hours PRN  senna 1 Tablet(s) Oral at bedtime PRN  sodium bicarbonate Mouth Rinse 10 milliLiter(s) Swish and Spit five times a day  ursodiol Capsule 300 milliGRAM(s) Oral two times a day    Immunologic:   filgrastim-sndz (ZARXIO) Injectable (Chemo) 480 MICROGram(s) SubCutaneous every 24 hours  mycophenolate mofetil 1000 milliGRAM(s) Oral three times a day  tacrolimus 4 milliGRAM(s) Oral <User Schedule>    Other medications:   acetaminophen     Tablet .. 650 milliGRAM(s) Oral every 6 hours  Biotene Dry Mouth Oral Rinse 5 milliLiter(s) Swish and Spit five times a day  Chemotherapy Worklist Order      chlorhexidine 4% Liquid 1 Application(s) Topical <User Schedule>  folic acid 1 milliGRAM(s) Oral daily  hydrocortisone sodium succinate Injectable 50 milliGRAM(s) IV Push every 24 hours  Infuse HPC Product      lidocaine/prilocaine Cream (Chemo) 1 Application(s) Topical daily  loratadine 10 milliGRAM(s) Oral daily  multivitamin 1 Tablet(s) Oral daily  oxymetazoline 0.05% Nasal Spray 2 Spray(s) Both Nostrils two times a day  sodium bicarbonate  Infusion (Chemo) 0.074 mEq/kG/Hr IV Continuous <Continuous>  sodium chloride 0.65% Nasal 1 Spray(s) Both Nostrils two times a day  sodium chloride 0.9% 1000 milliLiter(s) IV Continuous <Continuous>  sodium chloride 0.9%. 1000 milliLiter(s) IV Continuous <Continuous>  sodium chloride 0.9%. (Chemo) 1000 milliLiter(s) IV Continuous <Continuous>    PRN:   acetaminophen     Tablet .. 650 milliGRAM(s) Oral every 6 hours PRN  acetaminophen 325 mG/butalbital 50 mG/caffeine 40 mG 1 Tablet(s) Oral every 6 hours PRN  dibucaine 1% Ointment 1 Application(s) Topical two times a day PRN  guaiFENesin  milliGRAM(s) Oral every 12 hours PRN  hemorrhoidal Ointment 1 Application(s) Rectal three times a day PRN  loperamide 2 milliGRAM(s) Oral every 4 hours PRN  oxyCODONE    IR 5 milliGRAM(s) Oral every 4 hours PRN  prochlorperazine   Injectable 10 milliGRAM(s) IV Push every 6 hours PRN  pseudoephedrine 30 milliGRAM(s) Oral every 6 hours PRN  senna 1 Tablet(s) Oral at bedtime PRN  sodium chloride 0.9% lock flush 10 milliLiter(s) IV Push every 1 hour PRN  witch hazel Pads 1 Application(s) Topical four times a day PRN    A/P:     37 year old male with severe aplastic anemia with multiple lines of treatment admitted for a haplo-identical bone marrow transplant with  Flu / Cy / TBI preparative regimen   Day + 16  start CTX hydration - 1/2NS @ 200ml /hr    Strict I&O, daily weights, prn diuresis   Day -6 - day -2 - Fludarabine 30mg/m2 IV  Day -6 - day - 5 - CTX 14.5mg/kg/day IV. CTX to start after urine SG < 1.010. Mesna  12mg / kg - hemorrhagic cystitis ppx   Day -2 - IVIG 0.4 g/kg (ideal body weight) every 3 weeks. Premedication with Tylenol and benadryl   Day -1 -  cGy x 1 dose   Day - 1 - at 2200 begin transplant hydration : 0.45Ns + 1 amp (50mEq) sodium bicarbonate at 150ml /hr; continue transplant hydration for 24 hours post infusion    Day + 2 at 2200 - begin CTX hydration (0.45NS + 10 mEq KCl/ @150ml /m2  continue 24 hours post last dose of CTX   Day + 3 - + 4 - CTX 50mg/kg/day IV. Begin CTX when urine SG < 1.010. EKG daily. [place] for hemorraghic cystitis ppx.   Day + 5 - start Zarxio,  MMF and Tacrolimus. Check Tacrolimus levels on Monday and Thursday.   when ANC < 500, start Cipro 500mg po BID. If becomes febrile, pan cx, CXR and change Cipro to Cefepime 2g IV q 8 hours. Continue until count recovery  6/5- Fludarabine 4/5, intermittent nausea, continue antiemetics   6/6- Fludarabine 5/5, IVIG  6/7 TBI   6/8- HPC transplant today. Continue transplant hydration for 24 hours post infusion of bone marrow   6/9- monitor for CRS / haplo storm - if T >/= 102.5 on days +1 or +2, would dose tocilizumab 8mg / kg IVPB x 1   6/11 C.Diff negative, started imodium  6/12- post transplant CTX 2.2 - continue CTX hydration for 24 hours post infusion of last dose , Tocilizumab x1 dose O/N for temp of 39.1.   6/22 eye pain overnight, no visual acuity deficits. Opthalmology evaluating - appreciated - optic disc is sharp/pink, overall suggesting against a diagnosis of optic neuritis. Follow up CT maxillofacial/orbits -     1. Infectious Disease:   acyclovir   Oral Tab/Cap 400 milliGRAM(s) Oral every 12 hours  clotrimazole Lozenge 1 Lozenge Oral five times a day  fluconAZOLE   Tablet 400 milliGRAM(s) Oral daily  letermovir 480 milliGRAM(s) Oral daily  piperacillin/tazobactam IVPB.. 4.5 Gram(s) IV Intermittent every 8 hours    2. VOD Prophylaxis: Actigall, Glutamine, Heparin (dosed at 100 units / kg / day)     3. GI Prophylaxis:    famotidine    Tablet 20 milliGRAM(s) Oral two times a day    4. Mouthcare - NS / NaHCO3 rinses, Mycelex, Biotene; Skin care     5. GVHD prophylaxis   TBI day -1   CTX days +3, +4   mycophenolate mofetil 1000 milliGRAM(s) Oral three times a day  tacrolimus 3 milliGRAM(s) Oral <User Schedule>    6. Transfuse & replete electrolytes prn   Transfuse one unit platelets today.  6/24- Hypomagnesemia- Replete magnesium.     7. IV hydration, daily weights, strict I&O, prn diuresis     8. PO intake as tolerated, nutrition follow up as needed, MVI, folic acid     9. Antiemetics, anti-diarrhea medications:   loperamide 2 milliGRAM(s) Oral every 4 hours PRN  prochlorperazine   Injectable 10 milliGRAM(s) IV Push every 6 hours PRN    10. OOB as tolerated, physical therapy consult if needed     11. Monitor coags / fibrinogen 2x week, vitamin K as needed     12. Monitor closely for clinical changes, monitor for fevers     13. Emotional support provided, plan of care discussed and questions addressed     14. Patient education done regarding plan of care, restrictions and discharge planning     15. Continue regular social work input     I have written the above note for Dr. Gleason who performed service with me in the room.   Buck Sosa NP-C ([phone])    I have seen and examined patient with NP, I agree with above note as scribed.

## 2023-06-25 LAB
ALBUMIN SERPL ELPH-MCNC: 4 G/DL — SIGNIFICANT CHANGE UP (ref 3.3–5)
ALP SERPL-CCNC: 81 U/L — SIGNIFICANT CHANGE UP (ref 40–120)
ALT FLD-CCNC: 45 U/L — SIGNIFICANT CHANGE UP (ref 10–45)
ANION GAP SERPL CALC-SCNC: 10 MMOL/L — SIGNIFICANT CHANGE UP (ref 5–17)
AST SERPL-CCNC: 19 U/L — SIGNIFICANT CHANGE UP (ref 10–40)
BILIRUB SERPL-MCNC: 0.5 MG/DL — SIGNIFICANT CHANGE UP (ref 0.2–1.2)
BUN SERPL-MCNC: 10 MG/DL — SIGNIFICANT CHANGE UP (ref 7–23)
CALCIUM SERPL-MCNC: 9.1 MG/DL — SIGNIFICANT CHANGE UP (ref 8.4–10.5)
CHLORIDE SERPL-SCNC: 107 MMOL/L — SIGNIFICANT CHANGE UP (ref 96–108)
CO2 SERPL-SCNC: 26 MMOL/L — SIGNIFICANT CHANGE UP (ref 22–31)
CREAT SERPL-MCNC: 0.68 MG/DL — SIGNIFICANT CHANGE UP (ref 0.5–1.3)
EGFR: 124 ML/MIN/1.73M2 — SIGNIFICANT CHANGE UP
GLUCOSE SERPL-MCNC: 94 MG/DL — SIGNIFICANT CHANGE UP (ref 70–99)
HCT VFR BLD CALC: 18.7 % — CRITICAL LOW (ref 39–50)
HGB BLD-MCNC: 6.5 G/DL — CRITICAL LOW (ref 13–17)
LDH SERPL L TO P-CCNC: 106 U/L — SIGNIFICANT CHANGE UP (ref 50–242)
MAGNESIUM SERPL-MCNC: 1.4 MG/DL — LOW (ref 1.6–2.6)
MCHC RBC-ENTMCNC: 30.7 PG — SIGNIFICANT CHANGE UP (ref 27–34)
MCHC RBC-ENTMCNC: 34.8 GM/DL — SIGNIFICANT CHANGE UP (ref 32–36)
MCV RBC AUTO: 88.2 FL — SIGNIFICANT CHANGE UP (ref 80–100)
NRBC # BLD: 0 /100 WBCS — SIGNIFICANT CHANGE UP (ref 0–0)
PHOSPHATE SERPL-MCNC: 3.9 MG/DL — SIGNIFICANT CHANGE UP (ref 2.5–4.5)
PLATELET # BLD AUTO: 10 K/UL — CRITICAL LOW (ref 150–400)
POTASSIUM SERPL-MCNC: 4 MMOL/L — SIGNIFICANT CHANGE UP (ref 3.5–5.3)
POTASSIUM SERPL-SCNC: 4 MMOL/L — SIGNIFICANT CHANGE UP (ref 3.5–5.3)
PROT SERPL-MCNC: 6.2 G/DL — SIGNIFICANT CHANGE UP (ref 6–8.3)
RBC # BLD: 2.12 M/UL — LOW (ref 4.2–5.8)
RBC # FLD: 12.4 % — SIGNIFICANT CHANGE UP (ref 10.3–14.5)
SODIUM SERPL-SCNC: 143 MMOL/L — SIGNIFICANT CHANGE UP (ref 135–145)
WBC # BLD: 0.48 K/UL — CRITICAL LOW (ref 3.8–10.5)
WBC # FLD AUTO: 0.48 K/UL — CRITICAL LOW (ref 3.8–10.5)

## 2023-06-25 PROCEDURE — 99233 SBSQ HOSP IP/OBS HIGH 50: CPT

## 2023-06-25 RX ORDER — MAGNESIUM SULFATE 500 MG/ML
2 VIAL (ML) INJECTION ONCE
Refills: 0 | Status: COMPLETED | OUTPATIENT
Start: 2023-06-25 | End: 2023-06-25

## 2023-06-25 RX ADMIN — FAMOTIDINE 20 MILLIGRAM(S): 10 INJECTION INTRAVENOUS at 17:38

## 2023-06-25 RX ADMIN — PIPERACILLIN AND TAZOBACTAM 25 GRAM(S): 4; .5 INJECTION, POWDER, LYOPHILIZED, FOR SOLUTION INTRAVENOUS at 21:29

## 2023-06-25 RX ADMIN — MYCOPHENOLATE MOFETIL 1000 MILLIGRAM(S): 250 CAPSULE ORAL at 21:29

## 2023-06-25 RX ADMIN — Medication 50 MILLIGRAM(S): at 12:55

## 2023-06-25 RX ADMIN — OXYMETAZOLINE HYDROCHLORIDE 2 SPRAY(S): 0.5 SPRAY NASAL at 06:34

## 2023-06-25 RX ADMIN — Medication 1 SPRAY(S): at 17:40

## 2023-06-25 RX ADMIN — PIPERACILLIN AND TAZOBACTAM 25 GRAM(S): 4; .5 INJECTION, POWDER, LYOPHILIZED, FOR SOLUTION INTRAVENOUS at 06:31

## 2023-06-25 RX ADMIN — Medication 1 LOZENGE: at 16:04

## 2023-06-25 RX ADMIN — Medication 5 MILLILITER(S): at 16:03

## 2023-06-25 RX ADMIN — Medication 10 MILLILITER(S): at 20:05

## 2023-06-25 RX ADMIN — TACROLIMUS 4 MILLIGRAM(S): 5 CAPSULE ORAL at 07:38

## 2023-06-25 RX ADMIN — Medication 650 MILLIGRAM(S): at 04:30

## 2023-06-25 RX ADMIN — TACROLIMUS 4 MILLIGRAM(S): 5 CAPSULE ORAL at 20:06

## 2023-06-25 RX ADMIN — PIPERACILLIN AND TAZOBACTAM 25 GRAM(S): 4; .5 INJECTION, POWDER, LYOPHILIZED, FOR SOLUTION INTRAVENOUS at 13:17

## 2023-06-25 RX ADMIN — Medication 10 MILLILITER(S): at 16:04

## 2023-06-25 RX ADMIN — Medication 5 MILLILITER(S): at 07:37

## 2023-06-25 RX ADMIN — CHLORHEXIDINE GLUCONATE 1 APPLICATION(S): 213 SOLUTION TOPICAL at 06:33

## 2023-06-25 RX ADMIN — Medication 1 SPRAY(S): at 06:34

## 2023-06-25 RX ADMIN — LIDOCAINE AND PRILOCAINE CREAM 1 APPLICATION(S): 25; 25 CREAM TOPICAL at 13:21

## 2023-06-25 RX ADMIN — Medication 1 LOZENGE: at 00:06

## 2023-06-25 RX ADMIN — LORATADINE 10 MILLIGRAM(S): 10 TABLET ORAL at 11:19

## 2023-06-25 RX ADMIN — Medication 1 LOZENGE: at 11:18

## 2023-06-25 RX ADMIN — Medication 1 LOZENGE: at 20:05

## 2023-06-25 RX ADMIN — URSODIOL 300 MILLIGRAM(S): 250 TABLET, FILM COATED ORAL at 06:32

## 2023-06-25 RX ADMIN — Medication 650 MILLIGRAM(S): at 04:04

## 2023-06-25 RX ADMIN — Medication 1 TABLET(S): at 11:18

## 2023-06-25 RX ADMIN — Medication 2 MILLIGRAM(S): at 09:35

## 2023-06-25 RX ADMIN — Medication 650 MILLIGRAM(S): at 12:50

## 2023-06-25 RX ADMIN — Medication 1 MILLIGRAM(S): at 11:18

## 2023-06-25 RX ADMIN — Medication 5 MILLILITER(S): at 11:17

## 2023-06-25 RX ADMIN — LETERMOVIR 480 MILLIGRAM(S): 480 TABLET, FILM COATED ORAL at 11:18

## 2023-06-25 RX ADMIN — Medication 10 MILLILITER(S): at 11:18

## 2023-06-25 RX ADMIN — FAMOTIDINE 20 MILLIGRAM(S): 10 INJECTION INTRAVENOUS at 06:32

## 2023-06-25 RX ADMIN — FLUCONAZOLE 400 MILLIGRAM(S): 150 TABLET ORAL at 11:19

## 2023-06-25 RX ADMIN — URSODIOL 300 MILLIGRAM(S): 250 TABLET, FILM COATED ORAL at 17:38

## 2023-06-25 RX ADMIN — Medication 5 MILLILITER(S): at 00:06

## 2023-06-25 RX ADMIN — Medication 1 SPRAY(S): at 06:33

## 2023-06-25 RX ADMIN — Medication 5 MILLILITER(S): at 20:05

## 2023-06-25 RX ADMIN — Medication 25 GRAM(S): at 09:29

## 2023-06-25 RX ADMIN — Medication 1 LOZENGE: at 07:37

## 2023-06-25 RX ADMIN — Medication 400 MILLIGRAM(S): at 06:32

## 2023-06-25 RX ADMIN — MYCOPHENOLATE MOFETIL 1000 MILLIGRAM(S): 250 CAPSULE ORAL at 06:32

## 2023-06-25 RX ADMIN — Medication 10 MILLILITER(S): at 07:38

## 2023-06-25 RX ADMIN — Medication 480 MICROGRAM(S): at 13:20

## 2023-06-25 RX ADMIN — Medication 10 MILLILITER(S): at 00:06

## 2023-06-25 RX ADMIN — MYCOPHENOLATE MOFETIL 1000 MILLIGRAM(S): 250 CAPSULE ORAL at 13:18

## 2023-06-25 RX ADMIN — Medication 2 MILLIGRAM(S): at 13:36

## 2023-06-25 RX ADMIN — Medication 400 MILLIGRAM(S): at 17:38

## 2023-06-25 NOTE — PROGRESS NOTE ADULT - SUBJECTIVE AND OBJECTIVE BOX
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) with Flu / Cy / TBI prep regimen for treatment of severe aplastic anemia    S: Patient seen and examined with HPC Transplant Team:   + eye pain/pressure in left eye  + internal right gaze pain  + sinus pain, congestion  + general fatigue   + poor appetite      O: Vitals:   Vital Signs Last 24 Hrs  T(C): 36.8 (25 Jun 2023 06:00), Max: 37.3 (24 Jun 2023 21:27)  T(F): 98.2 (25 Jun 2023 06:00), Max: 99.1 (24 Jun 2023 21:27)  HR: 90 (25 Jun 2023 06:00) (82 - 106)  BP: 120/72 (25 Jun 2023 06:00) (112/67 - 144/85)  BP(mean): --  RR: 18 (25 Jun 2023 06:00) (17 - 18)  SpO2: 99% (25 Jun 2023 06:00) (97% - 100%)    Parameters below as of 24 Jun 2023 21:27  Patient On (Oxygen Delivery Method): room air      Admit weight: 102.8 kg   Daily weight: 95.4 kg       Intake / Output:   06-24 @ 07:01  -  06-25 @ 07:00  --------------------------------------------------------  IN: 2026 mL / OUT: 1450 mL / NET: 576 mL          PE:   Oropharynx: no erythema or ulcers  ENT: tenderness + in maxillary, ethmoidal sinus area  Oral Mucositis: (-)                                                        Grade: -  CVS: RRR, +S1,S2  Lungs: CTA B/L  Abdomen: Soft, NT, ND, +BS  Extremities: No edema in BLE  Gastric Mucositis: (-)                                                 Grade: -  Intestinal Mucositis:   (-)                                           Grade: -  Skin: no rash   TLC: CDI  Neuro: Alert, O x 3  Pain: Denies    Labs:               Cultures:         Radiology:       Meds:   Antimicrobials:   acyclovir   Oral Tab/Cap 400 milliGRAM(s) Oral every 12 hours  clotrimazole Lozenge 1 Lozenge Oral five times a day  fluconAZOLE   Tablet 400 milliGRAM(s) Oral daily  letermovir 480 milliGRAM(s) Oral daily  piperacillin/tazobactam IVPB.. 4.5 Gram(s) IV Intermittent every 8 hours      Heme / Onc:   heparin  Infusion 424 Unit(s)/Hr IV Continuous <Continuous>      GI:  famotidine    Tablet 20 milliGRAM(s) Oral two times a day  loperamide 2 milliGRAM(s) Oral every 4 hours PRN  senna 1 Tablet(s) Oral at bedtime PRN  sodium bicarbonate Mouth Rinse 10 milliLiter(s) Swish and Spit five times a day  ursodiol Capsule 300 milliGRAM(s) Oral two times a day      Cardiovascular:       Immunologic:   filgrastim-sndz (ZARXIO) Injectable (Chemo) 480 MICROGram(s) SubCutaneous every 24 hours  mycophenolate mofetil 1000 milliGRAM(s) Oral three times a day  tacrolimus 4 milliGRAM(s) Oral <User Schedule>      Other medications:   acetaminophen     Tablet .. 650 milliGRAM(s) Oral every 6 hours  Biotene Dry Mouth Oral Rinse 5 milliLiter(s) Swish and Spit five times a day  Chemotherapy Worklist Order      chlorhexidine 4% Liquid 1 Application(s) Topical <User Schedule>  fluticasone propionate 50 MICROgram(s)/spray Nasal Spray 1 Spray(s) Both Nostrils two times a day  folic acid 1 milliGRAM(s) Oral daily  hydrocortisone sodium succinate Injectable 50 milliGRAM(s) IV Push every 24 hours  Infuse HPC Product      lidocaine/prilocaine Cream (Chemo) 1 Application(s) Topical daily  loratadine 10 milliGRAM(s) Oral daily  multivitamin 1 Tablet(s) Oral daily  sodium bicarbonate  Infusion (Chemo) 0.074 mEq/kG/Hr IV Continuous <Continuous>  sodium chloride 0.65% Nasal 1 Spray(s) Both Nostrils two times a day  sodium chloride 0.9% 1000 milliLiter(s) IV Continuous <Continuous>  sodium chloride 0.9%. 1000 milliLiter(s) IV Continuous <Continuous>  sodium chloride 0.9%. (Chemo) 1000 milliLiter(s) IV Continuous <Continuous>      PRN:   acetaminophen     Tablet .. 650 milliGRAM(s) Oral every 6 hours PRN  acetaminophen 325 mG/butalbital 50 mG/caffeine 40 mG 1 Tablet(s) Oral every 6 hours PRN  dibucaine 1% Ointment 1 Application(s) Topical two times a day PRN  diphenhydrAMINE 50 milliGRAM(s) Oral every 6 hours PRN  guaiFENesin  milliGRAM(s) Oral every 12 hours PRN  hemorrhoidal Ointment 1 Application(s) Rectal three times a day PRN  hydrocortisone sodium succinate Injectable 50 milliGRAM(s) IV Push every 6 hours PRN  loperamide 2 milliGRAM(s) Oral every 4 hours PRN  oxyCODONE    IR 5 milliGRAM(s) Oral every 4 hours PRN  prochlorperazine   Injectable 10 milliGRAM(s) IV Push every 6 hours PRN  pseudoephedrine 30 milliGRAM(s) Oral every 6 hours PRN  senna 1 Tablet(s) Oral at bedtime PRN  sodium chloride 0.9% lock flush 10 milliLiter(s) IV Push every 1 hour PRN  witch hazel Pads 1 Application(s) Topical four times a day PRN      A/P:   37 year old male with severe aplastic anemia with multiple lines of treatment admitted for a haplo-identical bone marrow transplant with  Flu / Cy / TBI preparative regimen   Day + 16  start CTX hydration - 1/2NS @ 200ml /hr    Strict I&O, daily weights, prn diuresis   Day -6 - day -2 - Fludarabine 30mg/m2 IV  Day -6 - day - 5 - CTX 14.5mg/kg/day IV. CTX to start after urine SG < 1.010. Mesna  12mg / kg - hemorrhagic cystitis ppx   Day -2 - IVIG 0.4 g/kg (ideal body weight) every 3 weeks. Premedication with Tylenol and benadryl   Day -1 -  cGy x 1 dose   Day - 1 - at 2200 begin transplant hydration : 0.45Ns + 1 amp (50mEq) sodium bicarbonate at 150ml /hr; continue transplant hydration for 24 hours post infusion    Day + 2 at 2200 - begin CTX hydration (0.45NS + 10 mEq KCl/ @150ml /m2  continue 24 hours post last dose of CTX   Day + 3 - + 4 - CTX 50mg/kg/day IV. Begin CTX when urine SG < 1.010. EKG daily. Mesna for hemorraghic cystitis ppx.   Day + 5 - start Zarxio,  MMF and Tacrolimus. Check Tacrolimus levels on Monday and Thursday.   when ANC < 500, start Cipro 500mg po BID. If becomes febrile, pan cx, CXR and change Cipro to Cefepime 2g IV q 8 hours. Continue until count recovery  6/5- Fludarabine 4/5, intermittent nausea, continue antiemetics   6/6- Fludarabine 5/5, IVIG  6/7 TBI   6/8- HPC transplant today. Continue transplant hydration for 24 hours post infusion of bone marrow   6/9- monitor for CRS / haplo storm - if T >/= 102.5 on days +1 or +2, would dose tocilizumab 8mg / kg IVPB x 1   6/11 C.Diff negative, started imodium  6/12- post transplant CTX 2.2 - continue CTX hydration for 24 hours post infusion of last dose , Tocilizumab x1 dose O/N for temp of 39.1.   6/22 eye pain overnight, no visual acuity deficits. Opthalmology evaluating - appreciated - optic disc is sharp/pink, overall suggesting against a diagnosis of optic neuritis. Follow up CT maxillofacial/orbits -     1. Infectious Disease:   acyclovir   Oral Tab/Cap 400 milliGRAM(s) Oral every 12 hours  clotrimazole Lozenge 1 Lozenge Oral five times a day  fluconAZOLE   Tablet 400 milliGRAM(s) Oral daily  letermovir 480 milliGRAM(s) Oral daily  piperacillin/tazobactam IVPB.. 4.5 Gram(s) IV Intermittent every 8 hours    2. VOD Prophylaxis: Actigall, Glutamine, Heparin (dosed at 100 units / kg / day)     3. GI Prophylaxis:    famotidine    Tablet 20 milliGRAM(s) Oral two times a day    4. Mouthcare - NS / NaHCO3 rinses, Mycelex, Biotene; Skin care     5. GVHD prophylaxis   TBI day -1   CTX days +3, +4   mycophenolate mofetil 1000 milliGRAM(s) Oral three times a day  tacrolimus 3 milliGRAM(s) Oral <User Schedule>    6. Transfuse & replete electrolytes prn   Transfuse one unit platelets today.  6/24- Hypomagnesemia- Replete magnesium.     7. IV hydration, daily weights, strict I&O, prn diuresis     8. PO intake as tolerated, nutrition follow up as needed, MVI, folic acid     9. Antiemetics, anti-diarrhea medications:   loperamide 2 milliGRAM(s) Oral every 4 hours PRN  prochlorperazine   Injectable 10 milliGRAM(s) IV Push every 6 hours PRN    10. OOB as tolerated, physical therapy consult if needed     11. Monitor coags / fibrinogen 2x week, vitamin K as needed     12. Monitor closely for clinical changes, monitor for fevers     13. Emotional support provided, plan of care discussed and questions addressed     14. Patient education done regarding plan of care, restrictions and discharge planning     15. Continue regular social work input     I have written the above note for Dr. Lima who performed service with me in the room.   Buck Sosa  NP-C ([phone])    I have seen and examined patient with NP, I agree with above note as scribed.                    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) with Flu / Cy / TBI prep regimen for treatment of severe aplastic anemia    S: Patient seen and examined with HPC Transplant Team:   + eye pain/pressure in left eye improving  + internal right gaze pain  + sinus pain, congestion  + general fatigue   + poor appetite    O: Vitals:   Vital Signs Last 24 Hrs  T(C): 36.8 (25 Jun 2023 06:00), Max: 37.3 (24 Jun 2023 21:27)  T(F): 98.2 (25 Jun 2023 06:00), Max: 99.1 (24 Jun 2023 21:27)  HR: 90 (25 Jun 2023 06:00) (82 - 106)  BP: 120/72 (25 Jun 2023 06:00) (112/67 - 144/85)  BP(mean): --  RR: 18 (25 Jun 2023 06:00) (17 - 18)  SpO2: 99% (25 Jun 2023 06:00) (97% - 100%)    Parameters below as of 24 Jun 2023 21:27  Patient On (Oxygen Delivery Method): room air    Admit weight: 102.8 kg   Daily weight: 95 kg    Intake / Output:   06-24 @ 07:01  -  06-25 @ 07:00  --------------------------------------------------------  IN: 2026 mL / OUT: 1450 mL / NET: 576 mL    PE:   Oropharynx: no erythema or ulcers  ENT: tenderness + in maxillary, ethmoidal sinus area  Oral Mucositis: (-)                                                        Grade: -  CVS: RRR, +S1,S2  Lungs: CTA B/L  Abdomen: Soft, NT, ND, +BS  Extremities: No edema in BLE  Gastric Mucositis: (-)                                                 Grade: -  Intestinal Mucositis:   (-)                                           Grade: -  Skin: no rash   TLC: CDI  Neuro: Alert, O x 3  Pain: Denies    Labs:                         6.5    0.48  )-----------( 10       ( 25 Jun 2023 07:04 )             18.7     Mean Cell Volume : 88.2 fl  Mean Cell Hemoglobin : 30.7 pg  Mean Cell Hemoglobin Concentration : 34.8 gm/dL  Auto Neutrophil # : x  Auto Lymphocyte # : x  Auto Monocyte # : x  Auto Eosinophil # : x  Auto Basophil # : x  Auto Neutrophil % : x  Auto Lymphocyte % : x  Auto Monocyte % : x  Auto Eosinophil % : x  Auto Basophil % : x    06-25    143  |  107  |  10  ----------------------------<  94  4.0   |  26  |  0.68    Ca    9.1      25 Jun 2023 07:05  Phos  3.9     06-25  Mg     1.4     06-25    TPro  6.2  /  Alb  4.0  /  TBili  0.5  /  DBili  x   /  AST  19  /  ALT  45  /  AlkPhos  81  06-25  Mg 1.4  Phos 3.9    Uric Acid --    Cultures:   Culture - Blood (06.10.23 @ 21:45)    Specimen Source: .Blood Blood-Catheter   Culture Results:   No Growth Final    Culture - Urine (06.10.23 @ 22:18)    Specimen Source: Clean Catch Clean Catch (Midstream)   Culture Results:   No growth    Radiology:   CT Maxillofacial w/ IV Cont (06.22.23 @ 16:48) >  Pansinusitis, severe in the left maxillary and ethmoid sinuses, worsened   since comparison study.    Meds:   Antimicrobials:   acyclovir   Oral Tab/Cap 400 milliGRAM(s) Oral every 12 hours  clotrimazole Lozenge 1 Lozenge Oral five times a day  fluconAZOLE   Tablet 400 milliGRAM(s) Oral daily  letermovir 480 milliGRAM(s) Oral daily  piperacillin/tazobactam IVPB.. 4.5 Gram(s) IV Intermittent every 8 hours    Heme / Onc:   heparin  Infusion 424 Unit(s)/Hr IV Continuous <Continuous>    GI:  famotidine    Tablet 20 milliGRAM(s) Oral two times a day  loperamide 2 milliGRAM(s) Oral every 4 hours PRN  senna 1 Tablet(s) Oral at bedtime PRN  sodium bicarbonate Mouth Rinse 10 milliLiter(s) Swish and Spit five times a day  ursodiol Capsule 300 milliGRAM(s) Oral two times a day    Immunologic:   filgrastim-sndz (ZARXIO) Injectable (Chemo) 480 MICROGram(s) SubCutaneous every 24 hours  mycophenolate mofetil 1000 milliGRAM(s) Oral three times a day  tacrolimus 4 milliGRAM(s) Oral <User Schedule>    Other medications:   acetaminophen     Tablet .. 650 milliGRAM(s) Oral every 6 hours  Biotene Dry Mouth Oral Rinse 5 milliLiter(s) Swish and Spit five times a day  Chemotherapy Worklist Order      chlorhexidine 4% Liquid 1 Application(s) Topical <User Schedule>  fluticasone propionate 50 MICROgram(s)/spray Nasal Spray 1 Spray(s) Both Nostrils two times a day  folic acid 1 milliGRAM(s) Oral daily  hydrocortisone sodium succinate Injectable 50 milliGRAM(s) IV Push every 24 hours  Infuse HPC Product      lidocaine/prilocaine Cream (Chemo) 1 Application(s) Topical daily  loratadine 10 milliGRAM(s) Oral daily  multivitamin 1 Tablet(s) Oral daily  sodium bicarbonate  Infusion (Chemo) 0.074 mEq/kG/Hr IV Continuous <Continuous>  sodium chloride 0.65% Nasal 1 Spray(s) Both Nostrils two times a day  sodium chloride 0.9% 1000 milliLiter(s) IV Continuous <Continuous>  sodium chloride 0.9%. 1000 milliLiter(s) IV Continuous <Continuous>  sodium chloride 0.9%. (Chemo) 1000 milliLiter(s) IV Continuous <Continuous>    PRN:   acetaminophen     Tablet .. 650 milliGRAM(s) Oral every 6 hours PRN  acetaminophen 325 mG/butalbital 50 mG/caffeine 40 mG 1 Tablet(s) Oral every 6 hours PRN  dibucaine 1% Ointment 1 Application(s) Topical two times a day PRN  diphenhydrAMINE 50 milliGRAM(s) Oral every 6 hours PRN  guaiFENesin  milliGRAM(s) Oral every 12 hours PRN  hemorrhoidal Ointment 1 Application(s) Rectal three times a day PRN  hydrocortisone sodium succinate Injectable 50 milliGRAM(s) IV Push every 6 hours PRN  loperamide 2 milliGRAM(s) Oral every 4 hours PRN  oxyCODONE    IR 5 milliGRAM(s) Oral every 4 hours PRN  prochlorperazine   Injectable 10 milliGRAM(s) IV Push every 6 hours PRN  pseudoephedrine 30 milliGRAM(s) Oral every 6 hours PRN  senna 1 Tablet(s) Oral at bedtime PRN  sodium chloride 0.9% lock flush 10 milliLiter(s) IV Push every 1 hour PRN  witch hazel Pads 1 Application(s) Topical four times a day PRN    A/P: 37 year old male with severe aplastic anemia with multiple lines of treatment admitted for a haplo-identical bone marrow transplant with  Flu / Cy / TBI preparative regimen   Day + 17  start CTX hydration - 1/2NS @ 200ml /hr    Strict I&O, daily weights, prn diuresis   Day -6 - day -2 - Fludarabine 30mg/m2 IV  Day -6 - day - 5 - CTX 14.5mg/kg/day IV. CTX to start after urine SG < 1.010. Mesna  12mg / kg - hemorrhagic cystitis ppx   Day -2 - IVIG 0.4 g/kg (ideal body weight) every 3 weeks. Premedication with Tylenol and benadryl   Day -1 -  cGy x 1 dose   Day - 1 - at 2200 begin transplant hydration : 0.45Ns + 1 amp (50mEq) sodium bicarbonate at 150ml /hr; continue transplant hydration for 24 hours post infusion    Day + 2 at 2200 - begin CTX hydration (0.45NS + 10 mEq KCl/ @150ml /m2  continue 24 hours post last dose of CTX   Day + 3 - + 4 - CTX 50mg/kg/day IV. Begin CTX when urine SG < 1.010. EKG daily. Mesna for hemorraghic cystitis ppx.   Day + 5 - start Zarxio,  MMF and Tacrolimus. Check Tacrolimus levels on Monday and Thursday.   when ANC < 500, start Cipro 500mg po BID. If becomes febrile, pan cx, CXR and change Cipro to Cefepime 2g IV q 8 hours. Continue until count recovery  6/5- Fludarabine 4/5, intermittent nausea, continue antiemetics   6/6- Fludarabine 5/5, IVIG  6/7 TBI   6/8- HPC transplant today. Continue transplant hydration for 24 hours post infusion of bone marrow   6/9- monitor for CRS / haplo storm - if T >/= 102.5 on days +1 or +2, would dose tocilizumab 8mg / kg IVPB x 1   6/11 C.Diff negative, started imodium  6/12- post transplant CTX 2.2 - continue CTX hydration for 24 hours post infusion of last dose , Tocilizumab x1 dose O/N for temp of 39.1.   6/22 eye pain overnight, no visual acuity deficits. Opthalmology evaluating - appreciated - optic disc is sharp/pink, overall suggesting against a diagnosis of optic neuritis. Follow up CT maxillofacial/orbits -     1. Infectious Disease:   acyclovir   Oral Tab/Cap 400 milliGRAM(s) Oral every 12 hours  clotrimazole Lozenge 1 Lozenge Oral five times a day  fluconAZOLE   Tablet 400 milliGRAM(s) Oral daily  letermovir 480 milliGRAM(s) Oral daily  piperacillin/tazobactam IVPB.. 4.5 Gram(s) IV Intermittent every 8 hours    2. VOD Prophylaxis: Actigall, Glutamine, Heparin (dosed at 100 units / kg / day)     3. GI Prophylaxis:    famotidine    Tablet 20 milliGRAM(s) Oral two times a day    4. Mouthcare - NS / NaHCO3 rinses, Mycelex, Biotene; Skin care     5. GVHD prophylaxis   TBI day -1   CTX days +3, +4   mycophenolate mofetil 1000 milliGRAM(s) Oral three times a day  tacrolimus 3 milliGRAM(s) Oral <User Schedule>    6. Transfuse & replete electrolytes prn   Transfuse one unit PRBC today.  6/24- Hypomagnesemia- Replete magnesium.     7. IV hydration, daily weights, strict I&O, prn diuresis     8. PO intake as tolerated, nutrition follow up as needed, MVI, folic acid     9. Antiemetics, anti-diarrhea medications:   loperamide 2 milliGRAM(s) Oral every 4 hours PRN  prochlorperazine   Injectable 10 milliGRAM(s) IV Push every 6 hours PRN    10. OOB as tolerated, physical therapy consult if needed     11. Monitor coags / fibrinogen 2x week, vitamin K as needed     12. Monitor closely for clinical changes, monitor for fevers     13. Emotional support provided, plan of care discussed and questions addressed     14. Patient education done regarding plan of care, restrictions and discharge planning     15. Continue regular social work input     I have written the above note for Dr. Gleason who performed service with me in the room.   Buck Sosa  NP-C ([phone])    I have seen and examined patient with NP, I agree with above note as scribed.

## 2023-06-25 NOTE — PROGRESS NOTE ADULT - NS ATTEND AMEND GEN_ALL_CORE FT
I attest my time as attending is greater than 50% of the total combined time spent on qualifying patient care activities by the PA/NP and attending.     I have made amendments to the documentation where necessary. 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        Admit to BMTU  2. TLC placement in IR   3. Day -6 - day + 5 - antiemetics   4. Day - 6 start CTX hydration - 1/2NS @ 200ml /hr    5. Strict I&O, daily weights, prn diuresis   6. Day -6 - day -2 - Fludarabine 30mg/m2 IV  7. Day -6 - day - 5 - CTX 14.5mg/kg/day IV. CTX to start after urine SG < 1.010. Mesna  12mg / kg - hemorrhagic cystitis ppx   8. Day -2 - IVIG 0.4 g/kg (ideal body weight) every 3 weeks. Premedication with Tylenol and benadryl  9. Day -1 -  cGy x 1 dose   10. Day - 1 - at 2200 begin transplant hydration : 0.45Ns + 1 amp (50mEq) sodium bicarbonate at 150ml /hr; continue transplant hydration for 24 hours post infusion   11. Day 0 – HPC transplant   12. Day + 2 at 2200 - begin CTX hydration (0.45NS + 10 mEq KCl/ @150ml /m2  continue 24 hours post last dose of CTX   13. Day + 3 - + 4 - CTX 50mg/kg/day IV. Begin CTX when urine SG < 1.010. EKG daily. Mesna for hemorraghic cystitis ppx.   14. Day + 5 - start Zarxio,  MMF and Tacrolimus. Check Tacrolimus levels on Monday and Thursday.   15. Anxiolytics, antinausea, antidiarrhea medications as needed   16. Lasix PRN while being aggressively hydrated to avoid VOD   17. Nutritional support, pain management  Need for prophylactic measures:  1. VOD prophylaxis - low dose heparin gtt (dosed at 100 units / kg / day), glutamine supplementation, Actigall BID   2. PCP prophylaxis - Bactrim DS through day -2    3. Antiviral prophylaxis - Continue Acyclovir   4. Antifungal prophylaxis- Diflucan 400 mg po daily.  5.. GI prophylaxis - Protonix po QD   6. Antibacterial prophylaxis -  ANC < 500, started Cipro 500mg po BID. If becomes febrile, pan cx, CXR and change Cipro to Cefepime 2g IV q 8 hours. Continue until count recovery..slight rise in wbc noted after infusion of marrow  7. Aggressive mouth care and skin care as per protocol  8. GVHD prophylaxis- high dose CTX; MMF and Tacrolimus.  9. transfusion and electrolyte support    Patient with hemoglobin drop of ~2  on 6/4, likely due to chemotherapy however will repeat CBC tonight to establish stability, likely will require transfusion.  6/5 describes nausea  6/7 sister collected w/o complication  6/9 toci for haplo storm if t>102.5..will send blood cx on patient...marrow product cx positive for gram pos rods..likely contaminant  6/10 Afebrile, no new complaints.  6/11 Febrile to 100.6F. Cipro switched to cefepime. Infectious work up ordered.  6/12 day 4 ctx today...given toci for haplo storm..some loose bowel, cdiff neg  6/13 mmf, tacro and zarxio.  6/16 overall doing well..on tacro 2 mg..repeat on monday..goal 5 to 10.   6/21- patient developed significant acute left sided facial pressure/pain in sinus region. During last hospitalization he had same symptoms diagnosed as pansinusitis and treated with Unasyn. Will broaden antibiotic coverage to Zosyn IV on 6/20/23; continue Acyclovir, Diflucan. If worsening or persistent symptoms will order CT of sinuses.  6/23- CT maxillofacial(6/22)- Pansinusitis, severe in the left maxillary and ethmoid sinuses- continue Zosyn IV.    Risk Statement (NON-critical care).     On this date of service, level of risk to patient is considered: High.     Due to: Patient has aplastic anemia and is being treated with haplo-SCT, which carries a risk for infection, bleeding, and other life-threatening complications. I attest my time as attending is greater than 50% of the total combined time spent on qualifying patient care activities by the PA/NP and attending.     I have made amendments to the documentation where necessary. 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        Admit to BMTU  2. TLC placement in IR   3. Day -6 - day + 5 - antiemetics   4. Day - 6 start CTX hydration - 1/2NS @ 200ml /hr    5. Strict I&O, daily weights, prn diuresis   6. Day -6 - day -2 - Fludarabine 30mg/m2 IV  7. Day -6 - day - 5 - CTX 14.5mg/kg/day IV. CTX to start after urine SG < 1.010. Mesna  12mg / kg - hemorrhagic cystitis ppx   8. Day -2 - IVIG 0.4 g/kg (ideal body weight) every 3 weeks. Premedication with Tylenol and benadryl  9. Day -1 -  cGy x 1 dose   10. Day - 1 - at 2200 begin transplant hydration : 0.45Ns + 1 amp (50mEq) sodium bicarbonate at 150ml /hr; continue transplant hydration for 24 hours post infusion   11. Day 0 – HPC transplant   12. Day + 2 at 2200 - begin CTX hydration (0.45NS + 10 mEq KCl/ @150ml /m2  continue 24 hours post last dose of CTX   13. Day + 3 - + 4 - CTX 50mg/kg/day IV. Begin CTX when urine SG < 1.010. EKG daily. Mesna for hemorraghic cystitis ppx.   14. Day + 5 - start Zarxio,  MMF and Tacrolimus. Check Tacrolimus levels on Monday and Thursday.   15. Anxiolytics, antinausea, antidiarrhea medications as needed   16. Lasix PRN while being aggressively hydrated to avoid VOD   17. Nutritional support, pain management  Need for prophylactic measures:  1. VOD prophylaxis - low dose heparin gtt (dosed at 100 units / kg / day), glutamine supplementation, Actigall BID   2. PCP prophylaxis - Bactrim DS through day -2    3. Antiviral prophylaxis - Continue Acyclovir   4. Antifungal prophylaxis- Diflucan 400 mg po daily.  5.. GI prophylaxis - Protonix po QD   6. Antibacterial prophylaxis -  ANC < 500, started Cipro 500mg po BID. If becomes febrile, pan cx, CXR and change Cipro to Cefepime 2g IV q 8 hours. Continue until count recovery..slight rise in wbc noted after infusion of marrow  7. Aggressive mouth care and skin care as per protocol  8. GVHD prophylaxis- high dose CTX; MMF and Tacrolimus.  9. transfusion and electrolyte support    Patient with hemoglobin drop of ~2  on 6/4, likely due to chemotherapy however will repeat CBC tonight to establish stability, likely will require transfusion.  6/5 describes nausea  6/7 sister collected w/o complication  6/9 toci for haplo storm if t>102.5..will send blood cx on patient...marrow product cx positive for gram pos rods..likely contaminant  6/10 Afebrile, no new complaints.  6/11 Febrile to 100.6F. Cipro switched to cefepime. Infectious work up ordered.  6/12 day 4 ctx today...given toci for haplo storm..some loose bowel, cdiff neg  6/13 mmf, tacro and zarxio.  6/16 overall doing well..on tacro 2 mg..repeat on monday..goal 5 to 10.   6/21- patient developed significant acute left sided facial pressure/pain in sinus region. During last hospitalization he had same symptoms diagnosed as pansinusitis and treated with Unasyn. Will broaden antibiotic coverage to Zosyn IV on 6/20/23; continue Acyclovir, Diflucan. If worsening or persistent symptoms will order CT of sinuses.  6/23- CT maxillofacial(6/22)- Pansinusitis, severe in the left maxillary and ethmoid sinuses- continue Zosyn IV.    6/25 - WBC is increasing to 0.5, which suggests engraftment  Also, his left maxilla and left internal eye gaze pain is improving, especially relative to several days ago.    Risk Statement (NON-critical care).     On this date of service, level of risk to patient is considered: High.     Due to: Patient has aplastic anemia and is being treated with haplo-SCT, which carries a risk for infection, bleeding, and other life-threatening complications.

## 2023-06-26 LAB
ALBUMIN SERPL ELPH-MCNC: 4.1 G/DL — SIGNIFICANT CHANGE UP (ref 3.3–5)
ALP SERPL-CCNC: 84 U/L — SIGNIFICANT CHANGE UP (ref 40–120)
ALT FLD-CCNC: 55 U/L — HIGH (ref 10–45)
ANION GAP SERPL CALC-SCNC: 9 MMOL/L — SIGNIFICANT CHANGE UP (ref 5–17)
APTT BLD: 27.2 SEC — LOW (ref 27.5–35.5)
AST SERPL-CCNC: 23 U/L — SIGNIFICANT CHANGE UP (ref 10–40)
BASOPHILS # BLD AUTO: 0 K/UL — SIGNIFICANT CHANGE UP (ref 0–0.2)
BASOPHILS NFR BLD AUTO: 0 % — SIGNIFICANT CHANGE UP (ref 0–2)
BILIRUB DIRECT SERPL-MCNC: 0.1 MG/DL — SIGNIFICANT CHANGE UP (ref 0–0.3)
BILIRUB INDIRECT FLD-MCNC: 0.5 MG/DL — SIGNIFICANT CHANGE UP (ref 0.2–1)
BILIRUB SERPL-MCNC: 0.6 MG/DL — SIGNIFICANT CHANGE UP (ref 0.2–1.2)
BLD GP AB SCN SERPL QL: NEGATIVE — SIGNIFICANT CHANGE UP
BUN SERPL-MCNC: 12 MG/DL — SIGNIFICANT CHANGE UP (ref 7–23)
CALCIUM SERPL-MCNC: 9.2 MG/DL — SIGNIFICANT CHANGE UP (ref 8.4–10.5)
CHLORIDE SERPL-SCNC: 109 MMOL/L — HIGH (ref 96–108)
CO2 SERPL-SCNC: 26 MMOL/L — SIGNIFICANT CHANGE UP (ref 22–31)
CREAT SERPL-MCNC: 0.73 MG/DL — SIGNIFICANT CHANGE UP (ref 0.5–1.3)
EGFR: 122 ML/MIN/1.73M2 — SIGNIFICANT CHANGE UP
EOSINOPHIL # BLD AUTO: 0 K/UL — SIGNIFICANT CHANGE UP (ref 0–0.5)
EOSINOPHIL NFR BLD AUTO: 0 % — SIGNIFICANT CHANGE UP (ref 0–6)
FIBRINOGEN PPP-MCNC: 189 MG/DL — LOW (ref 200–445)
GLUCOSE SERPL-MCNC: 89 MG/DL — SIGNIFICANT CHANGE UP (ref 70–99)
HCT VFR BLD CALC: 21 % — CRITICAL LOW (ref 39–50)
HGB BLD-MCNC: 7.3 G/DL — LOW (ref 13–17)
INR BLD: 1.19 RATIO — HIGH (ref 0.88–1.16)
LDH SERPL L TO P-CCNC: 120 U/L — SIGNIFICANT CHANGE UP (ref 50–242)
LYMPHOCYTES # BLD AUTO: 0.03 K/UL — LOW (ref 1–3.3)
LYMPHOCYTES # BLD AUTO: 2.6 % — LOW (ref 13–44)
MAGNESIUM SERPL-MCNC: 1.4 MG/DL — LOW (ref 1.6–2.6)
MANUAL SMEAR VERIFICATION: SIGNIFICANT CHANGE UP
MCHC RBC-ENTMCNC: 30.5 PG — SIGNIFICANT CHANGE UP (ref 27–34)
MCHC RBC-ENTMCNC: 34.8 GM/DL — SIGNIFICANT CHANGE UP (ref 32–36)
MCV RBC AUTO: 87.9 FL — SIGNIFICANT CHANGE UP (ref 80–100)
MONOCYTES # BLD AUTO: 0.26 K/UL — SIGNIFICANT CHANGE UP (ref 0–0.9)
MONOCYTES NFR BLD AUTO: 21.1 % — HIGH (ref 2–14)
NEUTROPHILS # BLD AUTO: 0.95 K/UL — LOW (ref 1.8–7.4)
NEUTROPHILS NFR BLD AUTO: 71.9 % — SIGNIFICANT CHANGE UP (ref 43–77)
NEUTS BAND # BLD: 4.4 % — SIGNIFICANT CHANGE UP (ref 0–8)
NRBC # BLD: 2 /100 — HIGH (ref 0–0)
PHOSPHATE SERPL-MCNC: 4 MG/DL — SIGNIFICANT CHANGE UP (ref 2.5–4.5)
PLAT MORPH BLD: NORMAL — SIGNIFICANT CHANGE UP
PLATELET # BLD AUTO: 8 K/UL — CRITICAL LOW (ref 150–400)
POTASSIUM SERPL-MCNC: 3.9 MMOL/L — SIGNIFICANT CHANGE UP (ref 3.5–5.3)
POTASSIUM SERPL-SCNC: 3.9 MMOL/L — SIGNIFICANT CHANGE UP (ref 3.5–5.3)
PROT SERPL-MCNC: 6.3 G/DL — SIGNIFICANT CHANGE UP (ref 6–8.3)
PROTHROM AB SERPL-ACNC: 13.7 SEC — HIGH (ref 10.5–13.4)
RBC # BLD: 2.39 M/UL — LOW (ref 4.2–5.8)
RBC # FLD: 12.5 % — SIGNIFICANT CHANGE UP (ref 10.3–14.5)
RBC BLD AUTO: SIGNIFICANT CHANGE UP
RH IG SCN BLD-IMP: POSITIVE — SIGNIFICANT CHANGE UP
SODIUM SERPL-SCNC: 144 MMOL/L — SIGNIFICANT CHANGE UP (ref 135–145)
TACROLIMUS SERPL-MCNC: 5.4 NG/ML — SIGNIFICANT CHANGE UP
WBC # BLD: 1.25 K/UL — LOW (ref 3.8–10.5)
WBC # FLD AUTO: 1.25 K/UL — LOW (ref 3.8–10.5)

## 2023-06-26 PROCEDURE — 99233 SBSQ HOSP IP/OBS HIGH 50: CPT

## 2023-06-26 RX ORDER — MAGNESIUM OXIDE 400 MG ORAL TABLET 241.3 MG
400 TABLET ORAL
Refills: 0 | Status: DISCONTINUED | OUTPATIENT
Start: 2023-06-26 | End: 2023-06-30

## 2023-06-26 RX ORDER — MAGNESIUM SULFATE 500 MG/ML
2 VIAL (ML) INJECTION
Refills: 0 | Status: COMPLETED | OUTPATIENT
Start: 2023-06-26 | End: 2023-06-26

## 2023-06-26 RX ADMIN — MAGNESIUM OXIDE 400 MG ORAL TABLET 400 MILLIGRAM(S): 241.3 TABLET ORAL at 17:29

## 2023-06-26 RX ADMIN — MYCOPHENOLATE MOFETIL 1000 MILLIGRAM(S): 250 CAPSULE ORAL at 13:35

## 2023-06-26 RX ADMIN — Medication 5 MILLILITER(S): at 20:06

## 2023-06-26 RX ADMIN — Medication 50 MILLIGRAM(S): at 10:22

## 2023-06-26 RX ADMIN — Medication 1 TABLET(S): at 12:29

## 2023-06-26 RX ADMIN — MAGNESIUM OXIDE 400 MG ORAL TABLET 400 MILLIGRAM(S): 241.3 TABLET ORAL at 12:29

## 2023-06-26 RX ADMIN — Medication 10 MILLILITER(S): at 12:29

## 2023-06-26 RX ADMIN — Medication 10 MILLILITER(S): at 15:48

## 2023-06-26 RX ADMIN — PIPERACILLIN AND TAZOBACTAM 25 GRAM(S): 4; .5 INJECTION, POWDER, LYOPHILIZED, FOR SOLUTION INTRAVENOUS at 21:04

## 2023-06-26 RX ADMIN — Medication 650 MILLIGRAM(S): at 21:09

## 2023-06-26 RX ADMIN — Medication 5 MILLILITER(S): at 00:46

## 2023-06-26 RX ADMIN — LIDOCAINE AND PRILOCAINE CREAM 1 APPLICATION(S): 25; 25 CREAM TOPICAL at 13:44

## 2023-06-26 RX ADMIN — Medication 10 MILLILITER(S): at 20:06

## 2023-06-26 RX ADMIN — TACROLIMUS 4 MILLIGRAM(S): 5 CAPSULE ORAL at 07:51

## 2023-06-26 RX ADMIN — Medication 650 MILLIGRAM(S): at 21:39

## 2023-06-26 RX ADMIN — Medication 400 MILLIGRAM(S): at 05:59

## 2023-06-26 RX ADMIN — Medication 25 GRAM(S): at 12:30

## 2023-06-26 RX ADMIN — MYCOPHENOLATE MOFETIL 1000 MILLIGRAM(S): 250 CAPSULE ORAL at 05:58

## 2023-06-26 RX ADMIN — Medication 1 SPRAY(S): at 17:28

## 2023-06-26 RX ADMIN — Medication 25 GRAM(S): at 08:55

## 2023-06-26 RX ADMIN — TACROLIMUS 4 MILLIGRAM(S): 5 CAPSULE ORAL at 21:04

## 2023-06-26 RX ADMIN — Medication 5 MILLILITER(S): at 12:28

## 2023-06-26 RX ADMIN — Medication 10 MILLILITER(S): at 07:50

## 2023-06-26 RX ADMIN — SODIUM CHLORIDE 20 MILLILITER(S): 9 INJECTION INTRAMUSCULAR; INTRAVENOUS; SUBCUTANEOUS at 21:07

## 2023-06-26 RX ADMIN — LETERMOVIR 480 MILLIGRAM(S): 480 TABLET, FILM COATED ORAL at 12:30

## 2023-06-26 RX ADMIN — Medication 1 SPRAY(S): at 05:58

## 2023-06-26 RX ADMIN — Medication 5 MILLILITER(S): at 07:49

## 2023-06-26 RX ADMIN — FAMOTIDINE 20 MILLIGRAM(S): 10 INJECTION INTRAVENOUS at 05:59

## 2023-06-26 RX ADMIN — URSODIOL 300 MILLIGRAM(S): 250 TABLET, FILM COATED ORAL at 05:59

## 2023-06-26 RX ADMIN — PIPERACILLIN AND TAZOBACTAM 25 GRAM(S): 4; .5 INJECTION, POWDER, LYOPHILIZED, FOR SOLUTION INTRAVENOUS at 13:35

## 2023-06-26 RX ADMIN — Medication 650 MILLIGRAM(S): at 10:29

## 2023-06-26 RX ADMIN — URSODIOL 300 MILLIGRAM(S): 250 TABLET, FILM COATED ORAL at 17:23

## 2023-06-26 RX ADMIN — FAMOTIDINE 20 MILLIGRAM(S): 10 INJECTION INTRAVENOUS at 17:24

## 2023-06-26 RX ADMIN — Medication 1 LOZENGE: at 20:06

## 2023-06-26 RX ADMIN — Medication 10 MILLILITER(S): at 00:46

## 2023-06-26 RX ADMIN — Medication 1 LOZENGE: at 15:48

## 2023-06-26 RX ADMIN — CHLORHEXIDINE GLUCONATE 1 APPLICATION(S): 213 SOLUTION TOPICAL at 05:59

## 2023-06-26 RX ADMIN — FLUCONAZOLE 400 MILLIGRAM(S): 150 TABLET ORAL at 12:29

## 2023-06-26 RX ADMIN — LORATADINE 10 MILLIGRAM(S): 10 TABLET ORAL at 12:29

## 2023-06-26 RX ADMIN — MAGNESIUM OXIDE 400 MG ORAL TABLET 400 MILLIGRAM(S): 241.3 TABLET ORAL at 08:54

## 2023-06-26 RX ADMIN — Medication 50 MILLIGRAM(S): at 11:20

## 2023-06-26 RX ADMIN — Medication 1 MILLIGRAM(S): at 12:29

## 2023-06-26 RX ADMIN — Medication 480 MICROGRAM(S): at 12:30

## 2023-06-26 RX ADMIN — PIPERACILLIN AND TAZOBACTAM 25 GRAM(S): 4; .5 INJECTION, POWDER, LYOPHILIZED, FOR SOLUTION INTRAVENOUS at 05:57

## 2023-06-26 RX ADMIN — Medication 1 LOZENGE: at 00:46

## 2023-06-26 RX ADMIN — Medication 1 LOZENGE: at 12:28

## 2023-06-26 RX ADMIN — Medication 400 MILLIGRAM(S): at 17:23

## 2023-06-26 RX ADMIN — MYCOPHENOLATE MOFETIL 1000 MILLIGRAM(S): 250 CAPSULE ORAL at 21:04

## 2023-06-26 RX ADMIN — Medication 1 LOZENGE: at 07:50

## 2023-06-26 RX ADMIN — Medication 5 MILLILITER(S): at 15:49

## 2023-06-26 NOTE — PROGRESS NOTE ADULT - NS ATTEND AMEND GEN_ALL_CORE FT
I attest my time as attending is greater than 50% of the total combined time spent on qualifying patient care activities by the PA/NP and attending.     I have made amendments to the documentation where necessary. 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        Admit to BMTU  2. TLC placement in IR   3. Day -6 - day + 5 - antiemetics   4. Day - 6 start CTX hydration - 1/2NS @ 200ml /hr    5. Strict I&O, daily weights, prn diuresis   6. Day -6 - day -2 - Fludarabine 30mg/m2 IV  7. Day -6 - day - 5 - CTX 14.5mg/kg/day IV. CTX to start after urine SG < 1.010. Mesna  12mg / kg - hemorrhagic cystitis ppx   8. Day -2 - IVIG 0.4 g/kg (ideal body weight) every 3 weeks. Premedication with Tylenol and benadryl  9. Day -1 -  cGy x 1 dose   10. Day - 1 - at 2200 begin transplant hydration : 0.45Ns + 1 amp (50mEq) sodium bicarbonate at 150ml /hr; continue transplant hydration for 24 hours post infusion   11. Day 0 – HPC transplant   12. Day + 2 at 2200 - begin CTX hydration (0.45NS + 10 mEq KCl/ @150ml /m2  continue 24 hours post last dose of CTX   13. Day + 3 - + 4 - CTX 50mg/kg/day IV. Begin CTX when urine SG < 1.010. EKG daily. Mesna for hemorraghic cystitis ppx.   14. Day + 5 - start Zarxio,  MMF and Tacrolimus. Check Tacrolimus levels on Monday and Thursday.   15. Anxiolytics, antinausea, antidiarrhea medications as needed   16. Lasix PRN while being aggressively hydrated to avoid VOD   17. Nutritional support, pain management  Need for prophylactic measures:  1. VOD prophylaxis - low dose heparin gtt (dosed at 100 units / kg / day), glutamine supplementation, Actigall BID   2. PCP prophylaxis - Bactrim DS through day -2    3. Antiviral prophylaxis - Continue Acyclovir   4. Antifungal prophylaxis- Diflucan 400 mg po daily.  5.. GI prophylaxis - Protonix po QD   6. Antibacterial prophylaxis -  ANC < 500, started Cipro 500mg po BID. If becomes febrile, pan cx, CXR and change Cipro to Cefepime 2g IV q 8 hours. Continue until count recovery..slight rise in wbc noted after infusion of marrow  7. Aggressive mouth care and skin care as per protocol  8. GVHD prophylaxis- high dose CTX; MMF and Tacrolimus.  9. transfusion and electrolyte support    Patient with hemoglobin drop of ~2  on 6/4, likely due to chemotherapy however will repeat CBC tonight to establish stability, likely will require transfusion.  6/5 describes nausea  6/7 sister collected w/o complication  6/9 toci for haplo storm if t>102.5..will send blood cx on patient...marrow product cx positive for gram pos rods..likely contaminant  6/10 Afebrile, no new complaints.  6/11 Febrile to 100.6F. Cipro switched to cefepime. Infectious work up ordered.  6/12 day 4 ctx today...given toci for haplo storm..some loose bowel, cdiff neg  6/13 mmf, tacro and zarxio.  6/16 overall doing well..on tacro 2 mg..repeat on monday..goal 5 to 10.   6/21- patient developed significant acute left sided facial pressure/pain in sinus region. During last hospitalization he had same symptoms diagnosed as pansinusitis and treated with Unasyn. Will broaden antibiotic coverage to Zosyn IV on 6/20/23; continue Acyclovir, Diflucan. If worsening or persistent symptoms will order CT of sinuses.  6/23- CT maxillofacial(6/22)- Pansinusitis, severe in the left maxillary and ethmoid sinuses- continue Zosyn IV.    6/25 - WBC is increasing to 0.5, which suggests engraftment  Also, his left maxilla and left internal eye gaze pain is improving, especially relative to several days ago.    Risk Statement (NON-critical care).     On this date of service, level of risk to patient is considered: High.     Due to: Patient has aplastic anemia and is being treated with haplo-SCT, which carries a risk for infection, bleeding, and other life-threatening complications.

## 2023-06-26 NOTE — PHARMACOTHERAPY INTERVENTION NOTE - INTERVENTION TYPE RECOOMEND
Pharmacokinetics Evaluation
Therapy Recommended - Alternative treatment
Therapy Recommended - Additional therapy
Therapy Recommended - Additional therapy
Pharmacokinetics Evaluation
Pharmacokinetics Evaluation
Therapy Recommended - Additional therapy
Therapy Recommended - Additional therapy
Therapy Recommended - Alternative treatment
Pharmacokinetics Evaluation

## 2023-06-26 NOTE — PROGRESS NOTE ADULT - SUBJECTIVE AND OBJECTIVE BOX
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 () with Flu / Cy / TBI prep regimen for treatment of severe aplastic anemia    S: Patient seen and examined with HPC Transplant Team:       O: Vitals:   Vital Signs Last 24 Hrs  T(C): 36.8 (2023 06:00), Max: 37 (2023 17:48)  T(F): 98.2 (2023 06:00), Max: 98.6 (2023 17:48)  HR: 98 (2023 06:00) (90 - 104)  BP: 114/75 (2023 06:00) (114/75 - 148/92)  BP(mean): --  RR: 18 (2023 06:00) (18 - 18)  SpO2: 99% (2023 06:00) (97% - 99%)    Parameters below as of 2023 17:48  Patient On (Oxygen Delivery Method): room air      Admit weight: 102.8kg   Daily Weight in k (2023 08:35)  Today's weight;     Intake / Output:    @ 07:01  -  06- @ 07:00  --------------------------------------------------------  IN: 1824.1 mL / OUT: 2000 mL / NET: -175.9 mL        PE:   Oropharynx: no erythema or ulcers  ENT: tenderness + in maxillary, ethmoidal sinus area  Oral Mucositis: (-)                                                        Grade: -  CVS: RRR, +S1,S2  Lungs: CTA B/L  Abdomen: Soft, NT, ND, +BS  Extremities: No edema in BLE  Gastric Mucositis: (-)                                                 Grade: -  Intestinal Mucositis:   (-)                                           Grade: -  Skin: no rash   TLC: CDI  Neuro: Alert, O x 3  Pain: Denies      Labs:       Cultures:   Culture Results:   No growth (06.10.23 @ 22:18)    Culture Results:   No Growth Final (06.10.23 @ 21:45)    Culture Results:   No Growth Final (06.10.23 @ 21:30)    Culture Results:   No Growth Final (06.10.23 @ 07:14)    Radiology:   CT Maxillofacial w/ IV Cont (23 @ 16:48) >  Pansinusitis, severe in the left maxillary and ethmoid sinuses, worsened   since comparison study.    ACC: [id number] EXAM:  XR CHEST PORTABLE URGENT 1V   ORDERED BY:  JACQUE ONEIL   PROCEDURE DATE:  06/10/2023    Impression:  Clear lungs.        Meds:   Antimicrobials:   acyclovir   Oral Tab/Cap 400 milliGRAM(s) Oral every 12 hours  clotrimazole Lozenge 1 Lozenge Oral five times a day  fluconAZOLE   Tablet 400 milliGRAM(s) Oral daily  letermovir 480 milliGRAM(s) Oral daily  piperacillin/tazobactam IVPB.. 4.5 Gram(s) IV Intermittent every 8 hours      Heme / Onc:   heparin  Infusion 424 Unit(s)/Hr IV Continuous <Continuous>      GI:  famotidine    Tablet 20 milliGRAM(s) Oral two times a day  loperamide 2 milliGRAM(s) Oral every 4 hours PRN  senna 1 Tablet(s) Oral at bedtime PRN  sodium bicarbonate Mouth Rinse 10 milliLiter(s) Swish and Spit five times a day  ursodiol Capsule 300 milliGRAM(s) Oral two times a day      Cardiovascular:       Immunologic:   filgrastim-sndz (ZARXIO) Injectable (Chemo) 480 MICROGram(s) SubCutaneous every 24 hours  mycophenolate mofetil 1000 milliGRAM(s) Oral three times a day  tacrolimus 4 milliGRAM(s) Oral <User Schedule>      Other medications:   acetaminophen     Tablet .. 650 milliGRAM(s) Oral every 6 hours  Biotene Dry Mouth Oral Rinse 5 milliLiter(s) Swish and Spit five times a day  Chemotherapy Worklist Order      chlorhexidine 4% Liquid 1 Application(s) Topical <User Schedule>  fluticasone propionate 50 MICROgram(s)/spray Nasal Spray 1 Spray(s) Both Nostrils two times a day  folic acid 1 milliGRAM(s) Oral daily  hydrocortisone sodium succinate Injectable 50 milliGRAM(s) IV Push every 24 hours  Infuse HPC Product      lidocaine/prilocaine Cream (Chemo) 1 Application(s) Topical daily  loratadine 10 milliGRAM(s) Oral daily  magnesium oxide 400 milliGRAM(s) Oral three times a day with meals  magnesium sulfate  IVPB 2 Gram(s) IV Intermittent every 2 hours  multivitamin 1 Tablet(s) Oral daily  sodium bicarbonate  Infusion (Chemo) 0.074 mEq/kG/Hr IV Continuous <Continuous>  sodium chloride 0.65% Nasal 1 Spray(s) Both Nostrils two times a day  sodium chloride 0.9% 1000 milliLiter(s) IV Continuous <Continuous>  sodium chloride 0.9%. 1000 milliLiter(s) IV Continuous <Continuous>  sodium chloride 0.9%. (Chemo) 1000 milliLiter(s) IV Continuous <Continuous>      PRN:   acetaminophen     Tablet .. 650 milliGRAM(s) Oral every 6 hours PRN  acetaminophen 325 mG/butalbital 50 mG/caffeine 40 mG 1 Tablet(s) Oral every 6 hours PRN  dibucaine 1% Ointment 1 Application(s) Topical two times a day PRN  diphenhydrAMINE 50 milliGRAM(s) Oral every 6 hours PRN  guaiFENesin  milliGRAM(s) Oral every 12 hours PRN  hemorrhoidal Ointment 1 Application(s) Rectal three times a day PRN  hydrocortisone sodium succinate Injectable 50 milliGRAM(s) IV Push every 6 hours PRN  loperamide 2 milliGRAM(s) Oral every 4 hours PRN  oxyCODONE    IR 5 milliGRAM(s) Oral every 4 hours PRN  prochlorperazine   Injectable 10 milliGRAM(s) IV Push every 6 hours PRN  pseudoephedrine 30 milliGRAM(s) Oral every 6 hours PRN  senna 1 Tablet(s) Oral at bedtime PRN  sodium chloride 0.9% lock flush 10 milliLiter(s) IV Push every 1 hour PRN  witch hazel Pads 1 Application(s) Topical four times a day PRN    A/P: 37 year old male with severe aplastic anemia with multiple lines of treatment admitted for a haplo-identical bone marrow transplant with  Flu / Cy / TBI preparative regimen   Day + 18  start CTX hydration - 1/2NS @ 200ml /hr    Strict I&O, daily weights, prn diuresis   Day -6 - day -2 - Fludarabine 30mg/m2 IV  Day -6 - day - 5 - CTX 14.5mg/kg/day IV. CTX to start after urine SG < 1.010. Mesna  12mg / kg - hemorrhagic cystitis ppx   Day -2 - IVIG 0.4 g/kg (ideal body weight) every 3 weeks. Premedication with Tylenol and benadryl   Day -1 -  cGy x 1 dose   Day - 1 - at 2200 begin transplant hydration : 0.45Ns + 1 amp (50mEq) sodium bicarbonate at 150ml /hr; continue transplant hydration for 24 hours post infusion    Day + 2 at 2200 - begin CTX hydration (0.45NS + 10 mEq KCl/ @150ml /m2  continue 24 hours post last dose of CTX   Day + 3 - + 4 - CTX 50mg/kg/day IV. Begin CTX when urine SG < 1.010. EKG daily. Mesna for hemorraghic cystitis ppx.   Day + 5 - start Zarxio,  MMF and Tacrolimus. Check Tacrolimus levels on Monday and Thursday.   when ANC < 500, start Cipro 500mg po BID. If becomes febrile, pan cx, CXR and change Cipro to Cefepime 2g IV q 8 hours. Continue until count recovery  - Fludarabine , intermittent nausea, continue antiemetics   - Fludarabine , IVIG   TBI   - HPC transplant today. Continue transplant hydration for 24 hours post infusion of bone marrow   - monitor for CRS / haplo storm - if T >/= 102.5 on days +1 or +2, would dose tocilizumab 8mg / kg IVPB x 1    C.Diff negative, started imodium  - post transplant CTX 2.2 - continue CTX hydration for 24 hours post infusion of last dose , Tocilizumab x1 dose O/N for temp of 39.1.    eye pain overnight, no visual acuity deficits. Opthalmology evaluating - appreciated - optic disc is sharp/pink, overall suggesting against a diagnosis of optic neuritis. Follow up CT maxillofacial/orbits -   - CT maxillofacial / orbits consistent with pansinusitis, continue zosyn, nasal sprays     1. Infectious Disease:   acyclovir   Oral Tab/Cap 400 milliGRAM(s) Oral every 12 hours  clotrimazole Lozenge 1 Lozenge Oral five times a day  fluconAZOLE   Tablet 400 milliGRAM(s) Oral daily  letermovir 480 milliGRAM(s) Oral daily  piperacillin/tazobactam IVPB.. 4.5 Gram(s) IV Intermittent every 8 hours    2. VOD Prophylaxis: Actigall, Glutamine, Heparin (dosed at 100 units / kg / day)     3. GI Prophylaxis:    famotidine    Tablet 20 milliGRAM(s) Oral two times a day    4. Mouthcare - NS / NaHCO3 rinses, Mycelex, Biotene; Skin care     5. GVHD prophylaxis   TBI day -1   CTX days +3, +4   mycophenolate mofetil 1000 milliGRAM(s) Oral three times a day  tacrolimus 4 milliGRAM(s) Oral <User Schedule>    6. Transfuse & replete electrolytes prn   magnesium oxide 400 milliGRAM(s) Oral three times a day with meals  magnesium sulfate  IVPB 2 Gram(s) IV Intermittent every 2 hours    7. IV hydration, daily weights, strict I&O, prn diuresis     8. PO intake as tolerated, nutrition follow up as needed, MVI, folic acid     9. Antiemetics, anti-diarrhea medications:   loperamide 2 milliGRAM(s) Oral every 4 hours PRN  prochlorperazine   Injectable 10 milliGRAM(s) IV Push every 6 hours PRN    10. OOB as tolerated, physical therapy consult if needed     11. Monitor coags / fibrinogen 2x week, vitamin K as needed     12. Monitor closely for clinical changes, monitor for fevers     13. Emotional support provided, plan of care discussed and questions addressed     14. Patient education done regarding plan of care, restrictions and discharge planning     15. Continue regular social work input     I have written the above note for Dr. Lima who performed service with me in the room.   Mila De Guzman NP-C ([phone])    I have seen and examined patient with NP, I agree with above note as scribed.                    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 () with Flu / Cy / TBI prep regimen for treatment of severe aplastic anemia    S: Patient seen and examined with HPC Transplant Team:       O: Vitals:   Vital Signs Last 24 Hrs  T(C): 36.8 (2023 06:00), Max: 37 (2023 17:48)  T(F): 98.2 (2023 06:00), Max: 98.6 (2023 17:48)  HR: 98 (2023 06:00) (90 - 104)  BP: 114/75 (2023 06:00) (114/75 - 148/92)  BP(mean): --  RR: 18 (2023 06:00) (18 - 18)  SpO2: 99% (2023 06:00) (97% - 99%)    Parameters below as of 2023 17:48  Patient On (Oxygen Delivery Method): room air      Admit weight: 102.8kg   Daily Weight in k (2023 08:35)  Today's weight;     Intake / Output:    @ 07:01  -  06-26 @ 07:00  --------------------------------------------------------  IN: 1824.1 mL / OUT: 2000 mL / NET: -175.9 mL        PE:   Oropharynx: no erythema or ulcers  ENT: tenderness + in maxillary, ethmoidal sinus area  Oral Mucositis: (-)                                                        Grade: -  CVS: RRR, +S1,S2  Lungs: CTA B/L  Abdomen: Soft, NT, ND, +BS  Extremities: No edema in BLE  Gastric Mucositis: (-)                                                 Grade: -  Intestinal Mucositis:   (-)                                           Grade: -  Skin: no rash   TLC: CDI  Neuro: Alert, O x 3  Pain: Denies      Labs:                         7.3    1.25  )-----------( 8        ( 2023 07:26 )             21.0     06-26    144  |  109<H>  |  12  ----------------------------<  89  3.9   |  26  |  0.73    Ca    9.2      2023 07:23  Phos  4.0       Mg     1.4         TPro  6.3  /  Alb  4.1  /  TBili  0.6  /  DBili  0.1  /  AST  23  /  ALT  55<H>  /  AlkPhos  84        Cultures:   Culture Results:   No growth (06.10.23 @ 22:18)    Culture Results:   No Growth Final (06.10.23 @ 21:45)    Culture Results:   No Growth Final (06.10.23 @ 21:30)    Culture Results:   No Growth Final (06.10.23 @ 07:14)    Radiology:   CT Maxillofacial w/ IV Cont (23 @ 16:48) >  Pansinusitis, severe in the left maxillary and ethmoid sinuses, worsened   since comparison study.    ACC: [id number] EXAM:  XR CHEST PORTABLE URGENT 1V   ORDERED BY:  JACQUE ONEIL   PROCEDURE DATE:  06/10/2023    Impression:  Clear lungs.        Meds:   Antimicrobials:   acyclovir   Oral Tab/Cap 400 milliGRAM(s) Oral every 12 hours  clotrimazole Lozenge 1 Lozenge Oral five times a day  fluconAZOLE   Tablet 400 milliGRAM(s) Oral daily  letermovir 480 milliGRAM(s) Oral daily  piperacillin/tazobactam IVPB.. 4.5 Gram(s) IV Intermittent every 8 hours      Heme / Onc:   heparin  Infusion 424 Unit(s)/Hr IV Continuous <Continuous>      GI:  famotidine    Tablet 20 milliGRAM(s) Oral two times a day  loperamide 2 milliGRAM(s) Oral every 4 hours PRN  senna 1 Tablet(s) Oral at bedtime PRN  sodium bicarbonate Mouth Rinse 10 milliLiter(s) Swish and Spit five times a day  ursodiol Capsule 300 milliGRAM(s) Oral two times a day      Cardiovascular:       Immunologic:   filgrastim-sndz (ZARXIO) Injectable (Chemo) 480 MICROGram(s) SubCutaneous every 24 hours  mycophenolate mofetil 1000 milliGRAM(s) Oral three times a day  tacrolimus 4 milliGRAM(s) Oral <User Schedule>      Other medications:   acetaminophen     Tablet .. 650 milliGRAM(s) Oral every 6 hours  Biotene Dry Mouth Oral Rinse 5 milliLiter(s) Swish and Spit five times a day  Chemotherapy Worklist Order      chlorhexidine 4% Liquid 1 Application(s) Topical <User Schedule>  fluticasone propionate 50 MICROgram(s)/spray Nasal Spray 1 Spray(s) Both Nostrils two times a day  folic acid 1 milliGRAM(s) Oral daily  hydrocortisone sodium succinate Injectable 50 milliGRAM(s) IV Push every 24 hours  Infuse HPC Product      lidocaine/prilocaine Cream (Chemo) 1 Application(s) Topical daily  loratadine 10 milliGRAM(s) Oral daily  magnesium oxide 400 milliGRAM(s) Oral three times a day with meals  magnesium sulfate  IVPB 2 Gram(s) IV Intermittent every 2 hours  multivitamin 1 Tablet(s) Oral daily  sodium bicarbonate  Infusion (Chemo) 0.074 mEq/kG/Hr IV Continuous <Continuous>  sodium chloride 0.65% Nasal 1 Spray(s) Both Nostrils two times a day  sodium chloride 0.9% 1000 milliLiter(s) IV Continuous <Continuous>  sodium chloride 0.9%. 1000 milliLiter(s) IV Continuous <Continuous>  sodium chloride 0.9%. (Chemo) 1000 milliLiter(s) IV Continuous <Continuous>      PRN:   acetaminophen     Tablet .. 650 milliGRAM(s) Oral every 6 hours PRN  acetaminophen 325 mG/butalbital 50 mG/caffeine 40 mG 1 Tablet(s) Oral every 6 hours PRN  dibucaine 1% Ointment 1 Application(s) Topical two times a day PRN  diphenhydrAMINE 50 milliGRAM(s) Oral every 6 hours PRN  guaiFENesin  milliGRAM(s) Oral every 12 hours PRN  hemorrhoidal Ointment 1 Application(s) Rectal three times a day PRN  hydrocortisone sodium succinate Injectable 50 milliGRAM(s) IV Push every 6 hours PRN  loperamide 2 milliGRAM(s) Oral every 4 hours PRN  oxyCODONE    IR 5 milliGRAM(s) Oral every 4 hours PRN  prochlorperazine   Injectable 10 milliGRAM(s) IV Push every 6 hours PRN  pseudoephedrine 30 milliGRAM(s) Oral every 6 hours PRN  senna 1 Tablet(s) Oral at bedtime PRN  sodium chloride 0.9% lock flush 10 milliLiter(s) IV Push every 1 hour PRN  witch hazel Pads 1 Application(s) Topical four times a day PRN    A/P: 37 year old male with severe aplastic anemia with multiple lines of treatment admitted for a haplo-identical bone marrow transplant with  Flu / Cy / TBI preparative regimen   Day + 18  start CTX hydration - 1/2NS @ 200ml /hr    Strict I&O, daily weights, prn diuresis   Day -6 - day -2 - Fludarabine 30mg/m2 IV  Day -6 - day - 5 - CTX 14.5mg/kg/day IV. CTX to start after urine SG < 1.010. Mesna  12mg / kg - hemorrhagic cystitis ppx   Day -2 - IVIG 0.4 g/kg (ideal body weight) every 3 weeks. Premedication with Tylenol and benadryl   Day -1 -  cGy x 1 dose   Day - 1 - at 2200 begin transplant hydration : 0.45Ns + 1 amp (50mEq) sodium bicarbonate at 150ml /hr; continue transplant hydration for 24 hours post infusion    Day + 2 at 2200 - begin CTX hydration (0.45NS + 10 mEq KCl/ @150ml /m2  continue 24 hours post last dose of CTX   Day + 3 - + 4 - CTX 50mg/kg/day IV. Begin CTX when urine SG < 1.010. EKG daily. Mesna for hemorraghic cystitis ppx.   Day + 5 - start Zarxio,  MMF and Tacrolimus. Check Tacrolimus levels on Monday and Thursday.   when ANC < 500, start Cipro 500mg po BID. If becomes febrile, pan cx, CXR and change Cipro to Cefepime 2g IV q 8 hours. Continue until count recovery  - Fludarabine , intermittent nausea, continue antiemetics   - Fludarabine , IVIG   TBI   - HPC transplant today. Continue transplant hydration for 24 hours post infusion of bone marrow   - monitor for CRS / haplo storm - if T >/= 102.5 on days +1 or +2, would dose tocilizumab 8mg / kg IVPB x 1    C.Diff negative, started imodium  - post transplant CTX 2.2 - continue CTX hydration for 24 hours post infusion of last dose , Tocilizumab x1 dose O/N for temp of 39.1.   6/22 eye pain overnight, no visual acuity deficits. Opthalmology evaluating - appreciated - optic disc is sharp/pink, overall suggesting against a diagnosis of optic neuritis. Follow up CT maxillofacial/orbits -   - CT maxillofacial / orbits consistent with pansinusitis, continue zosyn, nasal sprays     1. Infectious Disease:   acyclovir   Oral Tab/Cap 400 milliGRAM(s) Oral every 12 hours  clotrimazole Lozenge 1 Lozenge Oral five times a day  fluconAZOLE   Tablet 400 milliGRAM(s) Oral daily  letermovir 480 milliGRAM(s) Oral daily  piperacillin/tazobactam IVPB.. 4.5 Gram(s) IV Intermittent every 8 hours    2. VOD Prophylaxis: Actigall, Glutamine, Heparin (dosed at 100 units / kg / day)     3. GI Prophylaxis:    famotidine    Tablet 20 milliGRAM(s) Oral two times a day    4. Mouthcare - NS / NaHCO3 rinses, Mycelex, Biotene; Skin care     5. GVHD prophylaxis   TBI day -1   CTX days +3, +4   mycophenolate mofetil 1000 milliGRAM(s) Oral three times a day  tacrolimus 4 milliGRAM(s) Oral <User Schedule>    6. Transfuse & replete electrolytes prn   magnesium oxide 400 milliGRAM(s) Oral three times a day with meals  magnesium sulfate  IVPB 2 Gram(s) IV Intermittent every 2 hours  1 bag platelets     7. IV hydration, daily weights, strict I&O, prn diuresis     8. PO intake as tolerated, nutrition follow up as needed, MVI, folic acid     9. Antiemetics, anti-diarrhea medications:   loperamide 2 milliGRAM(s) Oral every 4 hours PRN  prochlorperazine   Injectable 10 milliGRAM(s) IV Push every 6 hours PRN    10. OOB as tolerated, physical therapy consult if needed     11. Monitor coags / fibrinogen 2x week, vitamin K as needed     12. Monitor closely for clinical changes, monitor for fevers     13. Emotional support provided, plan of care discussed and questions addressed     14. Patient education done regarding plan of care, restrictions and discharge planning     15. Continue regular social work input     I have written the above note for Dr. Lima who performed service with me in the room.   Mila De Guzman NP-C ([phone])    I have seen and examined patient with NP, I agree with above note as scribed.                    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 () with Flu / Cy / TBI prep regimen for treatment of severe aplastic anemia    S: Patient seen and examined with HPC Transplant Team:   + sinus pressure (improving)     ROS otherwise negative     O: Vitals:   Vital Signs Last 24 Hrs  T(C): 36.8 (2023 06:00), Max: 37 (2023 17:48)  T(F): 98.2 (2023 06:00), Max: 98.6 (2023 17:48)  HR: 98 (2023 06:00) (90 - 104)  BP: 114/75 (2023 06:00) (114/75 - 148/92)  BP(mean): --  RR: 18 (2023 06:00) (18 - 18)  SpO2: 99% (2023 06:00) (97% - 99%)    Parameters below as of 2023 17:48  Patient On (Oxygen Delivery Method): room air      Admit weight: 102.8kg   Daily Weight in k (2023 08:35)  Today's weight; 95    Intake / Output:   - @ 07:01  -  06-26 @ 07:00  --------------------------------------------------------  IN: 1824.1 mL / OUT: 2000 mL / NET: -175.9 mL    PE:   Oropharynx: no erythema or ulcers  ENT: tenderness + in maxillary, ethmoidal sinus area  Oral Mucositis: (-)                                                        Grade: -  CVS: RRR, +S1,S2  Lungs: CTA B/L  Abdomen: Soft, NT, ND, +BS  Extremities: No edema in BLE  Gastric Mucositis: (-)                                                 Grade: -  Intestinal Mucositis:   (-)                                           Grade: -  Skin: localized patchy erythematous rash to left medial foot    TLC: CDI  Neuro: Alert, O x 3  Pain: Denies      Labs:                         7.3    1.25  )-----------( 8        ( 2023 07:26 )             21.0     06-26    144  |  109<H>  |  12  ----------------------------<  89  3.9   |  26  |  0.73    Ca    9.2      2023 07:23  Phos  4.0       Mg     1.4         TPro  6.3  /  Alb  4.1  /  TBili  0.6  /  DBili  0.1  /  AST  23  /  ALT  55<H>  /  AlkPhos  84        Cultures:   Culture Results:   No growth (06.10.23 @ 22:18)    Culture Results:   No Growth Final (06.10.23 @ 21:45)    Culture Results:   No Growth Final (06.10.23 @ 21:30)    Culture Results:   No Growth Final (06.10.23 @ 07:14)    Radiology:   CT Maxillofacial w/ IV Cont (23 @ 16:48) >  Pansinusitis, severe in the left maxillary and ethmoid sinuses, worsened   since comparison study.    ACC: [id number] EXAM:  XR CHEST PORTABLE URGENT 1V   ORDERED BY:  JACQUE ONEIL   PROCEDURE DATE:  06/10/2023    Impression:  Clear lungs.        Meds:   Antimicrobials:   acyclovir   Oral Tab/Cap 400 milliGRAM(s) Oral every 12 hours  clotrimazole Lozenge 1 Lozenge Oral five times a day  fluconAZOLE   Tablet 400 milliGRAM(s) Oral daily  letermovir 480 milliGRAM(s) Oral daily  piperacillin/tazobactam IVPB.. 4.5 Gram(s) IV Intermittent every 8 hours      Heme / Onc:   heparin  Infusion 424 Unit(s)/Hr IV Continuous <Continuous>      GI:  famotidine    Tablet 20 milliGRAM(s) Oral two times a day  loperamide 2 milliGRAM(s) Oral every 4 hours PRN  senna 1 Tablet(s) Oral at bedtime PRN  sodium bicarbonate Mouth Rinse 10 milliLiter(s) Swish and Spit five times a day  ursodiol Capsule 300 milliGRAM(s) Oral two times a day      Cardiovascular:       Immunologic:   filgrastim-sndz (ZARXIO) Injectable (Chemo) 480 MICROGram(s) SubCutaneous every 24 hours  mycophenolate mofetil 1000 milliGRAM(s) Oral three times a day  tacrolimus 4 milliGRAM(s) Oral <User Schedule>      Other medications:   acetaminophen     Tablet .. 650 milliGRAM(s) Oral every 6 hours  Biotene Dry Mouth Oral Rinse 5 milliLiter(s) Swish and Spit five times a day  Chemotherapy Worklist Order      chlorhexidine 4% Liquid 1 Application(s) Topical <User Schedule>  fluticasone propionate 50 MICROgram(s)/spray Nasal Spray 1 Spray(s) Both Nostrils two times a day  folic acid 1 milliGRAM(s) Oral daily  hydrocortisone sodium succinate Injectable 50 milliGRAM(s) IV Push every 24 hours  Infuse HPC Product      lidocaine/prilocaine Cream (Chemo) 1 Application(s) Topical daily  loratadine 10 milliGRAM(s) Oral daily  magnesium oxide 400 milliGRAM(s) Oral three times a day with meals  magnesium sulfate  IVPB 2 Gram(s) IV Intermittent every 2 hours  multivitamin 1 Tablet(s) Oral daily  sodium bicarbonate  Infusion (Chemo) 0.074 mEq/kG/Hr IV Continuous <Continuous>  sodium chloride 0.65% Nasal 1 Spray(s) Both Nostrils two times a day  sodium chloride 0.9% 1000 milliLiter(s) IV Continuous <Continuous>  sodium chloride 0.9%. 1000 milliLiter(s) IV Continuous <Continuous>  sodium chloride 0.9%. (Chemo) 1000 milliLiter(s) IV Continuous <Continuous>      PRN:   acetaminophen     Tablet .. 650 milliGRAM(s) Oral every 6 hours PRN  acetaminophen 325 mG/butalbital 50 mG/caffeine 40 mG 1 Tablet(s) Oral every 6 hours PRN  dibucaine 1% Ointment 1 Application(s) Topical two times a day PRN  diphenhydrAMINE 50 milliGRAM(s) Oral every 6 hours PRN  guaiFENesin  milliGRAM(s) Oral every 12 hours PRN  hemorrhoidal Ointment 1 Application(s) Rectal three times a day PRN  hydrocortisone sodium succinate Injectable 50 milliGRAM(s) IV Push every 6 hours PRN  loperamide 2 milliGRAM(s) Oral every 4 hours PRN  oxyCODONE    IR 5 milliGRAM(s) Oral every 4 hours PRN  prochlorperazine   Injectable 10 milliGRAM(s) IV Push every 6 hours PRN  pseudoephedrine 30 milliGRAM(s) Oral every 6 hours PRN  senna 1 Tablet(s) Oral at bedtime PRN  sodium chloride 0.9% lock flush 10 milliLiter(s) IV Push every 1 hour PRN  witch hazel Pads 1 Application(s) Topical four times a day PRN    A/P: 37 year old male with severe aplastic anemia with multiple lines of treatment admitted for a haplo-identical bone marrow transplant with  Flu / Cy / TBI preparative regimen   Day + 18  start CTX hydration - 1/2NS @ 200ml /hr    Strict I&O, daily weights, prn diuresis   Day -6 - day -2 - Fludarabine 30mg/m2 IV  Day -6 - day - 5 - CTX 14.5mg/kg/day IV. CTX to start after urine SG < 1.010. Mesna  12mg / kg - hemorrhagic cystitis ppx   Day -2 - IVIG 0.4 g/kg (ideal body weight) every 3 weeks. Premedication with Tylenol and benadryl   Day -1 -  cGy x 1 dose   Day - 1 - at 2200 begin transplant hydration : 0.45Ns + 1 amp (50mEq) sodium bicarbonate at 150ml /hr; continue transplant hydration for 24 hours post infusion    Day + 2 at 2200 - begin CTX hydration (0.45NS + 10 mEq KCl/ @150ml /m2  continue 24 hours post last dose of CTX   Day + 3 - + 4 - CTX 50mg/kg/day IV. Begin CTX when urine SG < 1.010. EKG daily. Mesna for hemorraghic cystitis ppx.   Day + 5 - start Zarxio,  MMF and Tacrolimus. Check Tacrolimus levels on Monday and Thursday.   when ANC < 500, start Cipro 500mg po BID. If becomes febrile, pan cx, CXR and change Cipro to Cefepime 2g IV q 8 hours. Continue until count recovery  - Fludarabine , intermittent nausea, continue antiemetics   - Fludarabine , IVIG   TBI   - HPC transplant today. Continue transplant hydration for 24 hours post infusion of bone marrow   - monitor for CRS / haplo storm - if T >/= 102.5 on days +1 or +2, would dose tocilizumab 8mg / kg IVPB x 1    C.Diff negative, started imodium  - post transplant CTX 2.2 - continue CTX hydration for 24 hours post infusion of last dose , Tocilizumab x1 dose O/N for temp of 39.1.    eye pain overnight, no visual acuity deficits. Opthalmology evaluating - appreciated - optic disc is sharp/pink, overall suggesting against a diagnosis of optic neuritis. Follow up CT maxillofacial/orbits -   - CT maxillofacial / orbits consistent with pansinusitis, continue zosyn, nasal sprays     1. Infectious Disease:   acyclovir   Oral Tab/Cap 400 milliGRAM(s) Oral every 12 hours  clotrimazole Lozenge 1 Lozenge Oral five times a day  fluconAZOLE   Tablet 400 milliGRAM(s) Oral daily  letermovir 480 milliGRAM(s) Oral daily  piperacillin/tazobactam IVPB.. 4.5 Gram(s) IV Intermittent every 8 hours    2. VOD Prophylaxis: Actigall, Glutamine, Heparin (dosed at 100 units / kg / day)     3. GI Prophylaxis:    famotidine    Tablet 20 milliGRAM(s) Oral two times a day    4. Mouthcare - NS / NaHCO3 rinses, Mycelex, Biotene; Skin care     5. GVHD prophylaxis   TBI day -1   CTX days +3, +4   mycophenolate mofetil 1000 milliGRAM(s) Oral three times a day  tacrolimus 4 milliGRAM(s) Oral <User Schedule>    6. Transfuse & replete electrolytes prn   magnesium oxide 400 milliGRAM(s) Oral three times a day with meals  magnesium sulfate  IVPB 2 Gram(s) IV Intermittent every 2 hours  1 bag platelets     7. IV hydration, daily weights, strict I&O, prn diuresis     8. PO intake as tolerated, nutrition follow up as needed, MVI, folic acid     9. Antiemetics, anti-diarrhea medications:   loperamide 2 milliGRAM(s) Oral every 4 hours PRN  prochlorperazine   Injectable 10 milliGRAM(s) IV Push every 6 hours PRN    10. OOB as tolerated, physical therapy consult if needed     11. Monitor coags / fibrinogen 2x week, vitamin K as needed     12. Monitor closely for clinical changes, monitor for fevers     13. Emotional support provided, plan of care discussed and questions addressed     14. Patient education done regarding plan of care, restrictions and discharge planning     15. Continue regular social work input     I have written the above note for Dr. Lima who performed service with me in the room.   Mila De Guzman NP-C ([phone])    I have seen and examined patient with NP, I agree with above note as scribed.                    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 () with Flu / Cy / TBI prep regimen for treatment of severe aplastic anemia    S: Patient seen and examined with HPC Transplant Team:   + sinus pressure (improving)   + nausea  + loose stool     ROS otherwise negative     O: Vitals:   Vital Signs Last 24 Hrs  T(C): 36.8 (2023 06:00), Max: 37 (2023 17:48)  T(F): 98.2 (2023 06:00), Max: 98.6 (2023 17:48)  HR: 98 (2023 06:00) (90 - 104)  BP: 114/75 (2023 06:00) (114/75 - 148/92)  BP(mean): --  RR: 18 (2023 06:00) (18 - 18)  SpO2: 99% (2023 06:00) (97% - 99%)    Parameters below as of 2023 17:48  Patient On (Oxygen Delivery Method): room air      Admit weight: 102.8kg   Daily Weight in k (2023 08:35)  Today's weight; 95    Intake / Output:   - @ 07:01  -  06-26 @ 07:00  --------------------------------------------------------  IN: 1824.1 mL / OUT: 2000 mL / NET: -175.9 mL    PE:   Oropharynx: no erythema or ulcers  ENT: tenderness + in maxillary, ethmoidal sinus area  Oral Mucositis: (-)                                                        Grade: -  CVS: RRR, +S1,S2  Lungs: CTA B/L  Abdomen: Soft, NT, ND, +BS  Extremities: No edema in BLE  Gastric Mucositis: (-)                                                 Grade: -  Intestinal Mucositis:   (-)                                           Grade: -  Skin: localized patchy erythematous rash to left medial foot    TLC: CDI  Neuro: Alert, O x 3  Pain: Denies      Labs:                         7.3    1.25  )-----------( 8        ( 2023 07:26 )             21.0         144  |  109<H>  |  12  ----------------------------<  89  3.9   |  26  |  0.73    Ca    9.2      2023 07:23  Phos  4.0       Mg     1.4         TPro  6.3  /  Alb  4.1  /  TBili  0.6  /  DBili  0.1  /  AST  23  /  ALT  55<H>  /  AlkPhos  84        Cultures:   Culture Results:   No growth (06.10.23 @ 22:18)    Culture Results:   No Growth Final (06.10.23 @ 21:45)    Culture Results:   No Growth Final (06.10.23 @ 21:30)    Culture Results:   No Growth Final (06.10.23 @ 07:14)    Radiology:   CT Maxillofacial w/ IV Cont (23 @ 16:48) >  Pansinusitis, severe in the left maxillary and ethmoid sinuses, worsened   since comparison study.    ACC: [id number] EXAM:  XR CHEST PORTABLE URGENT 1V   ORDERED BY:  JACQUE ONEIL   PROCEDURE DATE:  06/10/2023    Impression:  Clear lungs.        Meds:   Antimicrobials:   acyclovir   Oral Tab/Cap 400 milliGRAM(s) Oral every 12 hours  clotrimazole Lozenge 1 Lozenge Oral five times a day  fluconAZOLE   Tablet 400 milliGRAM(s) Oral daily  letermovir 480 milliGRAM(s) Oral daily  piperacillin/tazobactam IVPB.. 4.5 Gram(s) IV Intermittent every 8 hours      Heme / Onc:   heparin  Infusion 424 Unit(s)/Hr IV Continuous <Continuous>      GI:  famotidine    Tablet 20 milliGRAM(s) Oral two times a day  loperamide 2 milliGRAM(s) Oral every 4 hours PRN  senna 1 Tablet(s) Oral at bedtime PRN  sodium bicarbonate Mouth Rinse 10 milliLiter(s) Swish and Spit five times a day  ursodiol Capsule 300 milliGRAM(s) Oral two times a day      Cardiovascular:       Immunologic:   filgrastim-sndz (ZARXIO) Injectable (Chemo) 480 MICROGram(s) SubCutaneous every 24 hours  mycophenolate mofetil 1000 milliGRAM(s) Oral three times a day  tacrolimus 4 milliGRAM(s) Oral <User Schedule>      Other medications:   acetaminophen     Tablet .. 650 milliGRAM(s) Oral every 6 hours  Biotene Dry Mouth Oral Rinse 5 milliLiter(s) Swish and Spit five times a day  Chemotherapy Worklist Order      chlorhexidine 4% Liquid 1 Application(s) Topical <User Schedule>  fluticasone propionate 50 MICROgram(s)/spray Nasal Spray 1 Spray(s) Both Nostrils two times a day  folic acid 1 milliGRAM(s) Oral daily  hydrocortisone sodium succinate Injectable 50 milliGRAM(s) IV Push every 24 hours  Infuse HPC Product      lidocaine/prilocaine Cream (Chemo) 1 Application(s) Topical daily  loratadine 10 milliGRAM(s) Oral daily  magnesium oxide 400 milliGRAM(s) Oral three times a day with meals  magnesium sulfate  IVPB 2 Gram(s) IV Intermittent every 2 hours  multivitamin 1 Tablet(s) Oral daily  sodium bicarbonate  Infusion (Chemo) 0.074 mEq/kG/Hr IV Continuous <Continuous>  sodium chloride 0.65% Nasal 1 Spray(s) Both Nostrils two times a day  sodium chloride 0.9% 1000 milliLiter(s) IV Continuous <Continuous>  sodium chloride 0.9%. 1000 milliLiter(s) IV Continuous <Continuous>  sodium chloride 0.9%. (Chemo) 1000 milliLiter(s) IV Continuous <Continuous>      PRN:   acetaminophen     Tablet .. 650 milliGRAM(s) Oral every 6 hours PRN  acetaminophen 325 mG/butalbital 50 mG/caffeine 40 mG 1 Tablet(s) Oral every 6 hours PRN  dibucaine 1% Ointment 1 Application(s) Topical two times a day PRN  diphenhydrAMINE 50 milliGRAM(s) Oral every 6 hours PRN  guaiFENesin  milliGRAM(s) Oral every 12 hours PRN  hemorrhoidal Ointment 1 Application(s) Rectal three times a day PRN  hydrocortisone sodium succinate Injectable 50 milliGRAM(s) IV Push every 6 hours PRN  loperamide 2 milliGRAM(s) Oral every 4 hours PRN  oxyCODONE    IR 5 milliGRAM(s) Oral every 4 hours PRN  prochlorperazine   Injectable 10 milliGRAM(s) IV Push every 6 hours PRN  pseudoephedrine 30 milliGRAM(s) Oral every 6 hours PRN  senna 1 Tablet(s) Oral at bedtime PRN  sodium chloride 0.9% lock flush 10 milliLiter(s) IV Push every 1 hour PRN  witch hazel Pads 1 Application(s) Topical four times a day PRN    A/P: 37 year old male with severe aplastic anemia with multiple lines of treatment admitted for a haplo-identical bone marrow transplant with  Flu / Cy / TBI preparative regimen   Day + 18  start CTX hydration - 1/2NS @ 200ml /hr    Strict I&O, daily weights, prn diuresis   Day -6 - day -2 - Fludarabine 30mg/m2 IV  Day -6 - day - 5 - CTX 14.5mg/kg/day IV. CTX to start after urine SG < 1.010. Mesna  12mg / kg - hemorrhagic cystitis ppx   Day -2 - IVIG 0.4 g/kg (ideal body weight) every 3 weeks. Premedication with Tylenol and benadryl   Day -1 -  cGy x 1 dose   Day - 1 - at 2200 begin transplant hydration : 0.45Ns + 1 amp (50mEq) sodium bicarbonate at 150ml /hr; continue transplant hydration for 24 hours post infusion    Day + 2 at 2200 - begin CTX hydration (0.45NS + 10 mEq KCl/ @150ml /m2  continue 24 hours post last dose of CTX   Day + 3 - + 4 - CTX 50mg/kg/day IV. Begin CTX when urine SG < 1.010. EKG daily. Mesna for hemorraghic cystitis ppx.   Day + 5 - start Zarxio,  MMF and Tacrolimus. Check Tacrolimus levels on Monday and Thursday.   when ANC < 500, start Cipro 500mg po BID. If becomes febrile, pan cx, CXR and change Cipro to Cefepime 2g IV q 8 hours. Continue until count recovery  - Fludarabine , intermittent nausea, continue antiemetics   - Fludarabine , IVIG  7 TBI   - HPC transplant today. Continue transplant hydration for 24 hours post infusion of bone marrow   - monitor for CRS / haplo storm - if T >/= 102.5 on days +1 or +2, would dose tocilizumab 8mg / kg IVPB x 1    C.Diff negative, started imodium  - post transplant CTX 2.2 - continue CTX hydration for 24 hours post infusion of last dose , Tocilizumab x1 dose O/N for temp of 39.1.    eye pain overnight, no visual acuity deficits. Opthalmology evaluating - appreciated - optic disc is sharp/pink, overall suggesting against a diagnosis of optic neuritis. Follow up CT maxillofacial/orbits -   - CT maxillofacial / orbits consistent with pansinusitis, continue zosyn, nasal sprays     1. Infectious Disease:   acyclovir   Oral Tab/Cap 400 milliGRAM(s) Oral every 12 hours  clotrimazole Lozenge 1 Lozenge Oral five times a day  fluconAZOLE   Tablet 400 milliGRAM(s) Oral daily  letermovir 480 milliGRAM(s) Oral daily  piperacillin/tazobactam IVPB.. 4.5 Gram(s) IV Intermittent every 8 hours    2. VOD Prophylaxis: Actigall, Glutamine, Heparin (dosed at 100 units / kg / day)     3. GI Prophylaxis:    famotidine    Tablet 20 milliGRAM(s) Oral two times a day    4. Mouthcare - NS / NaHCO3 rinses, Mycelex, Biotene; Skin care     5. GVHD prophylaxis   TBI day -1   CTX days +3, +4   mycophenolate mofetil 1000 milliGRAM(s) Oral three times a day  tacrolimus 4 milliGRAM(s) Oral <User Schedule>    6. Transfuse & replete electrolytes prn   magnesium oxide 400 milliGRAM(s) Oral three times a day with meals  magnesium sulfate  IVPB 2 Gram(s) IV Intermittent every 2 hours  1 bag platelets     7. IV hydration, daily weights, strict I&O, prn diuresis     8. PO intake as tolerated, nutrition follow up as needed, MVI, folic acid     9. Antiemetics, anti-diarrhea medications:   loperamide 2 milliGRAM(s) Oral every 4 hours PRN  prochlorperazine   Injectable 10 milliGRAM(s) IV Push every 6 hours PRN    10. OOB as tolerated, physical therapy consult if needed     11. Monitor coags / fibrinogen 2x week, vitamin K as needed     12. Monitor closely for clinical changes, monitor for fevers     13. Emotional support provided, plan of care discussed and questions addressed     14. Patient education done regarding plan of care, restrictions and discharge planning     15. Continue regular social work input     I have written the above note for Dr. Lima who performed service with me in the room.   Mila De Guzman NP-C ([phone])    I have seen and examined patient with NP, I agree with above note as scribed.

## 2023-06-26 NOTE — PHARMACOTHERAPY INTERVENTION NOTE - COMMENTS
37-year-old male with severe aplastic anemia previously treated with eATG +CsA + eltrombopag in April 2020. He relapsed November 2021 and was treated with rATG + CSA + prednisone + eltombopag and then ravulizumab. He is here for a haploidentical allogeneic stem cell transplant, today is day +18. Course has been complicated by nausea, haplostorm, and pansinusitus (on zosyn day 7).     Most recent tacrolimus trough is 5.4 (drawn correctly, at steady state on 6/26) - agree with keeping dose at 4 mg every 12 hours to reach goal of 5-10 and re-checking a level on Thursday, 6/29. Will consider increasing dose to 4.5 mg PO every 12 hours if level stays within the 5-6 range on next level check.    Asmita Carranza, PharmD, BCOP  Stem Cell Transplant Clinical Pharmacy Specialist

## 2023-06-26 NOTE — PROGRESS NOTE ADULT - CRITICAL CARE ATTENDING COMMENT
Today is day + 18 s/p  haplo-identical(sister) bone marrow transplant with  Flu / Cy / TBI preparative regimen for severe aplastic anemia.        Admit to [facility]  2. TLC placement in IR   3. Day -6 - day + 5 - antiemetics   4. Day - 6 start CTX hydration - 1/2NS @ 200ml /hr    5. Strict I&O, daily weights, prn diuresis   6. Day -6 - day -2 - Fludarabine 30mg/m2 IV  7. Day -6 - day - 5 - CTX 14.5mg/kg/day IV. CTX to start after urine SG < 1.010. Mesna  12mg / kg - hemorrhagic cystitis ppx   8. Day -2 - IVIG 0.4 g/kg (ideal body weight) every 3 weeks. Premedication with Tylenol and benadryl  9. Day -1 -  cGy x 1 dose   10. Day - 1 - at 2200 begin transplant hydration : 0.45Ns + 1 amp (50mEq) sodium bicarbonate at 150ml /hr; continue transplant hydration for 24 hours post infusion   11. Day 0 – HPC transplant   12. Day + 2 at 2200 - begin CTX hydration (0.45NS + 10 mEq KCl/ @150ml /m2  continue 24 hours post last dose of CTX   13. Day + 3 - + 4 - CTX 50mg/kg/day IV. Begin CTX when urine SG < 1.010. EKG daily. Mesna for hemorraghic cystitis ppx.   14. Day + 5 - start Zarxio,  MMF and Tacrolimus. Check Tacrolimus levels on Monday and Thursday.   15. Anxiolytics, antinausea, antidiarrhea medications as needed   16. Lasix PRN while being aggressively hydrated to avoid VOD   17. Nutritional support, pain management  Need for prophylactic measures:  1. VOD prophylaxis - low dose heparin gtt (dosed at 100 units / kg / day), glutamine supplementation, Actigall BID   2. PCP prophylaxis - Bactrim DS through day -2    3. Antiviral prophylaxis - Continue Acyclovir   4. Antifungal prophylaxis- Diflucan 400 mg po daily.  5.. GI prophylaxis - Protonix po QD   6. Antibacterial prophylaxis -  ANC < 500, started Cipro 500mg po BID. If becomes febrile, pan cx, CXR and change Cipro to Cefepime 2g IV q 8 hours. Continue until count recovery..slight rise in wbc noted after infusion of marrow  7. Aggressive mouth care and skin care as per protocol  8. GVHD prophylaxis- high dose CTX; MMF and Tacrolimus.  9. transfusion and electrolyte support    Patient with hemoglobin drop of ~2  on 6/4, likely due to chemotherapy however will repeat CBC tonight to establish stability, likely will require transfusion.  6/5 describes nausea  6/7 sister collected w/o complication  6/9 toci for haplo storm if t>102.5..will send blood cx on patient...marrow product cx positive for gram pos rods..likely contaminant  6/10 Afebrile, no new complaints.  6/11 Febrile to 100.6F. Cipro switched to cefepime. Infectious work up ordered.  6/12 day 4 ctx today...given toci for haplo storm..some loose bowel, cdiff neg  6/13 mmf, tacro and zarxio.  6/16 overall doing well..on tacro 2 mg..repeat on monday..goal 5 to 10.   6/21- patient developed significant acute left sided facial pressure/pain in sinus region. During last hospitalization he had same symptoms diagnosed as pansinusitis and treated with Unasyn. Will broaden antibiotic coverage to Zosyn IV on 6/20/23; continue Acyclovir, Diflucan. If worsening or persistent symptoms will order CT of sinuses.  6/23- CT maxillofacial(6/22)- Pansinusitis, severe in the left maxillary and ethmoid sinuses- continue Zosyn IV.    6/25 - WBC is increasing to 0.5, which suggests engraftment  Also, his left maxilla and left internal eye gaze pain is improving, especially relative to several days ago.    Risk Statement (NON-critical care).     On this date of service, level of risk to patient is considered: High.     Due to: Patient has aplastic anemia and is being treated with haplo-SCT, which carries a risk for infection, bleeding, and other life-threatening complications.

## 2023-06-27 LAB
ALBUMIN SERPL ELPH-MCNC: 4.1 G/DL — SIGNIFICANT CHANGE UP (ref 3.3–5)
ALP SERPL-CCNC: 91 U/L — SIGNIFICANT CHANGE UP (ref 40–120)
ALT FLD-CCNC: 58 U/L — HIGH (ref 10–45)
ANION GAP SERPL CALC-SCNC: 11 MMOL/L — SIGNIFICANT CHANGE UP (ref 5–17)
AST SERPL-CCNC: 24 U/L — SIGNIFICANT CHANGE UP (ref 10–40)
BASOPHILS # BLD AUTO: 0 K/UL — SIGNIFICANT CHANGE UP (ref 0–0.2)
BASOPHILS NFR BLD AUTO: 0 % — SIGNIFICANT CHANGE UP (ref 0–2)
BILIRUB SERPL-MCNC: 0.5 MG/DL — SIGNIFICANT CHANGE UP (ref 0.2–1.2)
BUN SERPL-MCNC: 9 MG/DL — SIGNIFICANT CHANGE UP (ref 7–23)
CALCIUM SERPL-MCNC: 9.2 MG/DL — SIGNIFICANT CHANGE UP (ref 8.4–10.5)
CHLORIDE SERPL-SCNC: 105 MMOL/L — SIGNIFICANT CHANGE UP (ref 96–108)
CO2 SERPL-SCNC: 24 MMOL/L — SIGNIFICANT CHANGE UP (ref 22–31)
CREAT SERPL-MCNC: 0.71 MG/DL — SIGNIFICANT CHANGE UP (ref 0.5–1.3)
EGFR: 123 ML/MIN/1.73M2 — SIGNIFICANT CHANGE UP
EOSINOPHIL # BLD AUTO: 0 K/UL — SIGNIFICANT CHANGE UP (ref 0–0.5)
EOSINOPHIL NFR BLD AUTO: 0 % — SIGNIFICANT CHANGE UP (ref 0–6)
GLUCOSE SERPL-MCNC: 86 MG/DL — SIGNIFICANT CHANGE UP (ref 70–99)
HCT VFR BLD CALC: 21 % — CRITICAL LOW (ref 39–50)
HGB BLD-MCNC: 7.2 G/DL — LOW (ref 13–17)
LDH SERPL L TO P-CCNC: 130 U/L — SIGNIFICANT CHANGE UP (ref 50–242)
LYMPHOCYTES # BLD AUTO: 0.05 K/UL — LOW (ref 1–3.3)
LYMPHOCYTES # BLD AUTO: 2.7 % — LOW (ref 13–44)
MAGNESIUM SERPL-MCNC: 1.5 MG/DL — LOW (ref 1.6–2.6)
MANUAL SMEAR VERIFICATION: SIGNIFICANT CHANGE UP
MCHC RBC-ENTMCNC: 30.3 PG — SIGNIFICANT CHANGE UP (ref 27–34)
MCHC RBC-ENTMCNC: 34.3 GM/DL — SIGNIFICANT CHANGE UP (ref 32–36)
MCV RBC AUTO: 88.2 FL — SIGNIFICANT CHANGE UP (ref 80–100)
METAMYELOCYTES # FLD: 0.9 % — HIGH (ref 0–0)
MONOCYTES # BLD AUTO: 0.36 K/UL — SIGNIFICANT CHANGE UP (ref 0–0.9)
MONOCYTES NFR BLD AUTO: 18.2 % — HIGH (ref 2–14)
NEUTROPHILS # BLD AUTO: 1.55 K/UL — LOW (ref 1.8–7.4)
NEUTROPHILS NFR BLD AUTO: 78.2 % — HIGH (ref 43–77)
PHOSPHATE SERPL-MCNC: 4.1 MG/DL — SIGNIFICANT CHANGE UP (ref 2.5–4.5)
PLAT MORPH BLD: NORMAL — SIGNIFICANT CHANGE UP
PLATELET # BLD AUTO: 25 K/UL — LOW (ref 150–400)
POTASSIUM SERPL-MCNC: 3.7 MMOL/L — SIGNIFICANT CHANGE UP (ref 3.5–5.3)
POTASSIUM SERPL-SCNC: 3.7 MMOL/L — SIGNIFICANT CHANGE UP (ref 3.5–5.3)
PROT SERPL-MCNC: 6.4 G/DL — SIGNIFICANT CHANGE UP (ref 6–8.3)
RBC # BLD: 2.38 M/UL — LOW (ref 4.2–5.8)
RBC # FLD: 12.5 % — SIGNIFICANT CHANGE UP (ref 10.3–14.5)
RBC BLD AUTO: SIGNIFICANT CHANGE UP
SODIUM SERPL-SCNC: 140 MMOL/L — SIGNIFICANT CHANGE UP (ref 135–145)
WBC # BLD: 1.98 K/UL — LOW (ref 3.8–10.5)
WBC # FLD AUTO: 1.98 K/UL — LOW (ref 3.8–10.5)

## 2023-06-27 PROCEDURE — 88291 CYTO/MOLECULAR REPORT: CPT

## 2023-06-27 PROCEDURE — 99233 SBSQ HOSP IP/OBS HIGH 50: CPT

## 2023-06-27 RX ORDER — FLUCONAZOLE 150 MG/1
2 TABLET ORAL
Qty: 60 | Refills: 3
Start: 2023-06-27 | End: 2023-10-24

## 2023-06-27 RX ORDER — TACROLIMUS 5 MG/1
2 CAPSULE ORAL
Qty: 120 | Refills: 3
Start: 2023-06-27 | End: 2023-10-24

## 2023-06-27 RX ORDER — URSODIOL 250 MG/1
1 TABLET, FILM COATED ORAL
Qty: 60 | Refills: 3
Start: 2023-06-27 | End: 2023-10-24

## 2023-06-27 RX ORDER — URSODIOL 250 MG/1
1 TABLET, FILM COATED ORAL
Qty: 0 | Refills: 0 | DISCHARGE
Start: 2023-06-27

## 2023-06-27 RX ORDER — FOLIC ACID 0.8 MG
1 TABLET ORAL
Qty: 30 | Refills: 3
Start: 2023-06-27 | End: 2023-10-24

## 2023-06-27 RX ORDER — MAGNESIUM OXIDE 400 MG ORAL TABLET 241.3 MG
1 TABLET ORAL
Qty: 90 | Refills: 3
Start: 2023-06-27 | End: 2023-10-24

## 2023-06-27 RX ORDER — ONDANSETRON 8 MG/1
1 TABLET, FILM COATED ORAL
Qty: 90 | Refills: 0
Start: 2023-06-27 | End: 2023-07-26

## 2023-06-27 RX ORDER — LETERMOVIR 480 MG/1
1 TABLET, FILM COATED ORAL
Qty: 0 | Refills: 0 | DISCHARGE
Start: 2023-06-27

## 2023-06-27 RX ORDER — ATOVAQUONE 750 MG/5ML
5 SUSPENSION ORAL
Qty: 300 | Refills: 3
Start: 2023-06-27 | End: 2023-10-24

## 2023-06-27 RX ORDER — MYCOPHENOLATE MOFETIL 250 MG/1
2 CAPSULE ORAL
Qty: 90 | Refills: 0
Start: 2023-06-27 | End: 2023-07-11

## 2023-06-27 RX ORDER — FAMOTIDINE 10 MG/ML
1 INJECTION INTRAVENOUS
Qty: 0 | Refills: 0 | DISCHARGE
Start: 2023-06-27

## 2023-06-27 RX ORDER — MAGNESIUM SULFATE 500 MG/ML
2 VIAL (ML) INJECTION
Refills: 0 | Status: COMPLETED | OUTPATIENT
Start: 2023-06-27 | End: 2023-06-27

## 2023-06-27 RX ORDER — MAGNESIUM OXIDE 400 MG ORAL TABLET 241.3 MG
1 TABLET ORAL
Qty: 0 | Refills: 0 | DISCHARGE
Start: 2023-06-27

## 2023-06-27 RX ORDER — FOLIC ACID 0.8 MG
1 TABLET ORAL
Qty: 0 | Refills: 0 | DISCHARGE
Start: 2023-06-27

## 2023-06-27 RX ORDER — TACROLIMUS 5 MG/1
3 CAPSULE ORAL
Qty: 180 | Refills: 3
Start: 2023-06-27 | End: 2023-10-24

## 2023-06-27 RX ORDER — FLUCONAZOLE 150 MG/1
2 TABLET ORAL
Qty: 0 | Refills: 0 | DISCHARGE
Start: 2023-06-27

## 2023-06-27 RX ORDER — ACYCLOVIR SODIUM 500 MG
1 VIAL (EA) INTRAVENOUS
Qty: 0 | Refills: 0 | DISCHARGE
Start: 2023-06-27

## 2023-06-27 RX ORDER — ACYCLOVIR SODIUM 500 MG
1 VIAL (EA) INTRAVENOUS
Qty: 60 | Refills: 3
Start: 2023-06-27 | End: 2023-10-24

## 2023-06-27 RX ADMIN — Medication 480 MICROGRAM(S): at 12:51

## 2023-06-27 RX ADMIN — FAMOTIDINE 20 MILLIGRAM(S): 10 INJECTION INTRAVENOUS at 17:39

## 2023-06-27 RX ADMIN — LORATADINE 10 MILLIGRAM(S): 10 TABLET ORAL at 12:51

## 2023-06-27 RX ADMIN — TACROLIMUS 4 MILLIGRAM(S): 5 CAPSULE ORAL at 20:57

## 2023-06-27 RX ADMIN — FAMOTIDINE 20 MILLIGRAM(S): 10 INJECTION INTRAVENOUS at 05:08

## 2023-06-27 RX ADMIN — Medication 400 MILLIGRAM(S): at 17:39

## 2023-06-27 RX ADMIN — Medication 1 SPRAY(S): at 05:09

## 2023-06-27 RX ADMIN — Medication 5 MILLILITER(S): at 00:13

## 2023-06-27 RX ADMIN — Medication 1 SPRAY(S): at 17:43

## 2023-06-27 RX ADMIN — Medication 25 GRAM(S): at 10:44

## 2023-06-27 RX ADMIN — Medication 25 GRAM(S): at 08:11

## 2023-06-27 RX ADMIN — Medication 1 LOZENGE: at 15:23

## 2023-06-27 RX ADMIN — Medication 1 LOZENGE: at 12:52

## 2023-06-27 RX ADMIN — URSODIOL 300 MILLIGRAM(S): 250 TABLET, FILM COATED ORAL at 17:39

## 2023-06-27 RX ADMIN — TACROLIMUS 4 MILLIGRAM(S): 5 CAPSULE ORAL at 07:54

## 2023-06-27 RX ADMIN — Medication 10 MILLILITER(S): at 21:00

## 2023-06-27 RX ADMIN — Medication 2 MILLIGRAM(S): at 21:59

## 2023-06-27 RX ADMIN — LETERMOVIR 480 MILLIGRAM(S): 480 TABLET, FILM COATED ORAL at 12:54

## 2023-06-27 RX ADMIN — Medication 1 LOZENGE: at 08:07

## 2023-06-27 RX ADMIN — LIDOCAINE AND PRILOCAINE CREAM 1 APPLICATION(S): 25; 25 CREAM TOPICAL at 13:30

## 2023-06-27 RX ADMIN — URSODIOL 300 MILLIGRAM(S): 250 TABLET, FILM COATED ORAL at 05:08

## 2023-06-27 RX ADMIN — Medication 1 LOZENGE: at 21:01

## 2023-06-27 RX ADMIN — Medication 1 MILLIGRAM(S): at 12:51

## 2023-06-27 RX ADMIN — PIPERACILLIN AND TAZOBACTAM 25 GRAM(S): 4; .5 INJECTION, POWDER, LYOPHILIZED, FOR SOLUTION INTRAVENOUS at 05:09

## 2023-06-27 RX ADMIN — MAGNESIUM OXIDE 400 MG ORAL TABLET 400 MILLIGRAM(S): 241.3 TABLET ORAL at 08:11

## 2023-06-27 RX ADMIN — Medication 1 TABLET(S): at 12:51

## 2023-06-27 RX ADMIN — MYCOPHENOLATE MOFETIL 1000 MILLIGRAM(S): 250 CAPSULE ORAL at 05:08

## 2023-06-27 RX ADMIN — Medication 5 MILLILITER(S): at 21:00

## 2023-06-27 RX ADMIN — PIPERACILLIN AND TAZOBACTAM 25 GRAM(S): 4; .5 INJECTION, POWDER, LYOPHILIZED, FOR SOLUTION INTRAVENOUS at 13:03

## 2023-06-27 RX ADMIN — Medication 10 MILLILITER(S): at 15:23

## 2023-06-27 RX ADMIN — Medication 5 MILLILITER(S): at 15:23

## 2023-06-27 RX ADMIN — Medication 10 MILLILITER(S): at 08:07

## 2023-06-27 RX ADMIN — Medication 5 MILLILITER(S): at 08:07

## 2023-06-27 RX ADMIN — MYCOPHENOLATE MOFETIL 1000 MILLIGRAM(S): 250 CAPSULE ORAL at 21:59

## 2023-06-27 RX ADMIN — Medication 10 MILLILITER(S): at 12:52

## 2023-06-27 RX ADMIN — PIPERACILLIN AND TAZOBACTAM 25 GRAM(S): 4; .5 INJECTION, POWDER, LYOPHILIZED, FOR SOLUTION INTRAVENOUS at 21:02

## 2023-06-27 RX ADMIN — Medication 5 MILLILITER(S): at 12:52

## 2023-06-27 RX ADMIN — MAGNESIUM OXIDE 400 MG ORAL TABLET 400 MILLIGRAM(S): 241.3 TABLET ORAL at 12:51

## 2023-06-27 RX ADMIN — MYCOPHENOLATE MOFETIL 1000 MILLIGRAM(S): 250 CAPSULE ORAL at 13:03

## 2023-06-27 RX ADMIN — Medication 10 MILLILITER(S): at 00:13

## 2023-06-27 RX ADMIN — Medication 400 MILLIGRAM(S): at 05:08

## 2023-06-27 RX ADMIN — FLUCONAZOLE 400 MILLIGRAM(S): 150 TABLET ORAL at 12:51

## 2023-06-27 RX ADMIN — CHLORHEXIDINE GLUCONATE 1 APPLICATION(S): 213 SOLUTION TOPICAL at 08:08

## 2023-06-27 RX ADMIN — MAGNESIUM OXIDE 400 MG ORAL TABLET 400 MILLIGRAM(S): 241.3 TABLET ORAL at 17:39

## 2023-06-27 RX ADMIN — Medication 1 LOZENGE: at 00:13

## 2023-06-27 NOTE — PROGRESS NOTE ADULT - SUBJECTIVE AND OBJECTIVE BOX
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 () with Flu / Cy / TBI prep regimen for treatment of severe aplastic anemia    S: Patient seen and examined with HPC Transplant Team:     O: Vitals:   Vital Signs Last 24 Hrs  T(C): 36.7 (2023 05:07), Max: 37.2 (2023 21:00)  T(F): 98.1 (2023 05:07), Max: 99 (2023 21:00)  HR: 97 (2023 05:07) (84 - 102)  BP: 130/80 (2023 05:07) (105/64 - 142/85)  BP(mean): --  RR: 18 (2023 05:07) (18 - 18)  SpO2: 99% (2023 05:07) (98% - 100%)    Parameters below as of 2023 05:07  Patient On (Oxygen Delivery Method): room air      Admit weight: 102.8kg   Daily Weight in k (2023 08:25)  Today's weight:     Intake / Output:    @ 07: @ 07:00  --------------------------------------------------------  IN: 2042.9 mL / OUT: 2100 mL / NET: -57.1 mL     @ 07: @ 07:49  --------------------------------------------------------  IN: 59.1 mL / OUT: 0 mL / NET: 59.1 mL    PE:   Oropharynx: no erythema or ulcers  ENT: tenderness + in maxillary, ethmoidal sinus area  Oral Mucositis: (-)                                                        Grade: -  CVS: RRR, +S1,S2  Lungs: CTA B/L  Abdomen: Soft, NT, ND, +BS  Extremities: No edema in BLE  Gastric Mucositis: (-)                                                 Grade: -  Intestinal Mucositis:   (-)                                           Grade: -  Skin: localized patchy erythematous rash to left medial foot    TLC: CDI  Neuro: Alert, O x 3  Pain: Denies    Labs:         140  |  105  |  9   ----------------------------<  86  3.7   |  24  |  0.71    Ca    9.2      2023 07:10  Phos  4.1       Mg     1.5         TPro  6.4  /  Alb  4.1  /  TBili  0.5  /  DBili  x   /  AST  24  /  ALT  58<H>  /  AlkPhos  91      PT/INR - ( 2023 07:26 )   PT: 13.7 sec;   INR: 1.19 ratio         PTT - ( 2023 07:26 )  PTT:27.2 sec  LIVER FUNCTIONS - ( 2023 07:10 )  Alb: 4.1 g/dL / Pro: 6.4 g/dL / ALK PHOS: 91 U/L / ALT: 58 U/L / AST: 24 U/L / GGT: x           Lactate Dehydrogenase, Serum: 130 U/L ( @ 07:10)    Cultures:   Culture Results:   No growth (06.10.23 @ 22:18)    Culture Results:   No Growth Final (06.10.23 @ 21:45)    Culture Results:   No Growth Final (06.10.23 @ 21:30)    Culture Results:   No Growth Final (06.10.23 @ 07:14)    Radiology:   CT Maxillofacial w/ IV Cont (23 @ 16:48) >  Pansinusitis, severe in the left maxillary and ethmoid sinuses, worsened   since comparison study.    ACC: [id number] EXAM:  XR CHEST PORTABLE URGENT 1V   ORDERED BY:  JACQUE ONEIL   PROCEDURE DATE:  06/10/2023    Impression:  Clear lungs.    Meds:   Antimicrobials:   acyclovir   Oral Tab/Cap 400 milliGRAM(s) Oral every 12 hours  clotrimazole Lozenge 1 Lozenge Oral five times a day  fluconAZOLE   Tablet 400 milliGRAM(s) Oral daily  letermovir 480 milliGRAM(s) Oral daily  piperacillin/tazobactam IVPB.. 4.5 Gram(s) IV Intermittent every 8 hours      Heme / Onc:   heparin  Infusion 424 Unit(s)/Hr IV Continuous <Continuous>      GI:  famotidine    Tablet 20 milliGRAM(s) Oral two times a day  loperamide 2 milliGRAM(s) Oral every 4 hours PRN  senna 1 Tablet(s) Oral at bedtime PRN  sodium bicarbonate Mouth Rinse 10 milliLiter(s) Swish and Spit five times a day  ursodiol Capsule 300 milliGRAM(s) Oral two times a day      Cardiovascular:       Immunologic:   filgrastim-sndz (ZARXIO) Injectable (Chemo) 480 MICROGram(s) SubCutaneous every 24 hours  mycophenolate mofetil 1000 milliGRAM(s) Oral three times a day  tacrolimus 4 milliGRAM(s) Oral <User Schedule>      Other medications:   acetaminophen     Tablet .. 650 milliGRAM(s) Oral every 6 hours  Biotene Dry Mouth Oral Rinse 5 milliLiter(s) Swish and Spit five times a day  Chemotherapy Worklist Order      chlorhexidine 4% Liquid 1 Application(s) Topical <User Schedule>  fluticasone propionate 50 MICROgram(s)/spray Nasal Spray 1 Spray(s) Both Nostrils two times a day  folic acid 1 milliGRAM(s) Oral daily  hydrocortisone sodium succinate Injectable 50 milliGRAM(s) IV Push every 24 hours  Infuse HPC Product      lidocaine/prilocaine Cream (Chemo) 1 Application(s) Topical daily  loratadine 10 milliGRAM(s) Oral daily  magnesium oxide 400 milliGRAM(s) Oral three times a day with meals  magnesium sulfate  IVPB 2 Gram(s) IV Intermittent every 2 hours  multivitamin 1 Tablet(s) Oral daily  sodium bicarbonate  Infusion (Chemo) 0.074 mEq/kG/Hr IV Continuous <Continuous>  sodium chloride 0.65% Nasal 1 Spray(s) Both Nostrils two times a day  sodium chloride 0.9% 1000 milliLiter(s) IV Continuous <Continuous>  sodium chloride 0.9%. 1000 milliLiter(s) IV Continuous <Continuous>  sodium chloride 0.9%. (Chemo) 1000 milliLiter(s) IV Continuous <Continuous>      PRN:   acetaminophen     Tablet .. 650 milliGRAM(s) Oral every 6 hours PRN  acetaminophen 325 mG/butalbital 50 mG/caffeine 40 mG 1 Tablet(s) Oral every 6 hours PRN  dibucaine 1% Ointment 1 Application(s) Topical two times a day PRN  diphenhydrAMINE 50 milliGRAM(s) Oral every 6 hours PRN  guaiFENesin  milliGRAM(s) Oral every 12 hours PRN  hemorrhoidal Ointment 1 Application(s) Rectal three times a day PRN  hydrocortisone sodium succinate Injectable 50 milliGRAM(s) IV Push every 6 hours PRN  loperamide 2 milliGRAM(s) Oral every 4 hours PRN  oxyCODONE    IR 5 milliGRAM(s) Oral every 4 hours PRN  prochlorperazine   Injectable 10 milliGRAM(s) IV Push every 6 hours PRN  pseudoephedrine 30 milliGRAM(s) Oral every 6 hours PRN  senna 1 Tablet(s) Oral at bedtime PRN  sodium chloride 0.9% lock flush 10 milliLiter(s) IV Push every 1 hour PRN  witch hazel Pads 1 Application(s) Topical four times a day PRN      A/P: 37 year old male with severe aplastic anemia with multiple lines of treatment admitted for a haplo-identical bone marrow transplant with  Flu / Cy / TBI preparative regimen   Day + 19  start CTX hydration - 1/2NS @ 200ml /hr    Strict I&O, daily weights, prn diuresis   Day -6 - day -2 - Fludarabine 30mg/m2 IV  Day -6 - day - 5 - CTX 14.5mg/kg/day IV. CTX to start after urine SG < 1.010. Mesna  12mg / kg - hemorrhagic cystitis ppx   Day -2 - IVIG 0.4 g/kg (ideal body weight) every 3 weeks. Premedication with Tylenol and benadryl   Day -1 -  cGy x 1 dose   Day - 1 - at 2200 begin transplant hydration : 0.45Ns + 1 amp (50mEq) sodium bicarbonate at 150ml /hr; continue transplant hydration for 24 hours post infusion    Day + 2 at 2200 - begin CTX hydration (0.45NS + 10 mEq KCl/ @150ml /m2  continue 24 hours post last dose of CTX   Day + 3 - + 4 - CTX 50mg/kg/day IV. Begin CTX when urine SG < 1.010. EKG daily. Mesna for hemorraghic cystitis ppx.   Day + 5 - start Zarxio,  MMF and Tacrolimus. Check Tacrolimus levels on Monday and Thursday.   when ANC < 500, start Cipro 500mg po BID. If becomes febrile, pan cx, CXR and change Cipro to Cefepime 2g IV q 8 hours. Continue until count recovery  - Fludarabine , intermittent nausea, continue antiemetics   - Fludarabine , IVIG   TBI   - HPC transplant today. Continue transplant hydration for 24 hours post infusion of bone marrow   - monitor for CRS / haplo storm - if T >/= 102.5 on days +1 or +2, would dose tocilizumab 8mg / kg IVPB x 1    C.Diff negative, started imodium  - post transplant CTX 2.2 - continue CTX hydration for 24 hours post infusion of last dose , Tocilizumab x1 dose O/N for temp of 39.1.    eye pain overnight, no visual acuity deficits. Opthalmology evaluating - appreciated - optic disc is sharp/pink, overall suggesting against a diagnosis of optic neuritis. Follow up CT maxillofacial/orbits -   - CT maxillofacial / orbits consistent with pansinusitis, continue zosyn, nasal sprays     1. Infectious Disease:   acyclovir   Oral Tab/Cap 400 milliGRAM(s) Oral every 12 hours  clotrimazole Lozenge 1 Lozenge Oral five times a day  fluconAZOLE   Tablet 400 milliGRAM(s) Oral daily  letermovir 480 milliGRAM(s) Oral daily  piperacillin/tazobactam IVPB.. 4.5 Gram(s) IV Intermittent every 8 hours    2. VOD Prophylaxis: Actigall, Glutamine, Heparin (dosed at 100 units / kg / day)     3. GI Prophylaxis:    famotidine    Tablet 20 milliGRAM(s) Oral two times a day    4. Mouthcare - NS / NaHCO3 rinses, Mycelex, Biotene; Skin care     5. GVHD prophylaxis   TBI day -1   CTX days +3, +4   mycophenolate mofetil 1000 milliGRAM(s) Oral three times a day  tacrolimus 4 milliGRAM(s) Oral <User Schedule>    6. Transfuse & replete electrolytes prn   magnesium oxide 400 milliGRAM(s) Oral three times a day with meals  magnesium sulfate  IVPB 2 Gram(s) IV Intermittent every 2 hours    7. IV hydration, daily weights, strict I&O, prn diuresis     8. PO intake as tolerated, nutrition follow up as needed, MVI, folic acid     9. Antiemetics, anti-diarrhea medications:   loperamide 2 milliGRAM(s) Oral every 4 hours PRN  prochlorperazine   Injectable 10 milliGRAM(s) IV Push every 6 hours PRN    10. OOB as tolerated, physical therapy consult if needed     11. Monitor coags / fibrinogen 2x week, vitamin K as needed     12. Monitor closely for clinical changes, monitor for fevers     13. Emotional support provided, plan of care discussed and questions addressed     14. Patient education done regarding plan of care, restrictions and discharge planning     15. Continue regular social work input     I have written the above note for Dr. Lima who performed service with me in the room.   Mila De Guzman NP-C ([phone])    I have seen and examined patient with NP, I agree with above note as scribed.                    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 () with Flu / Cy / TBI prep regimen for treatment of severe aplastic anemia    S: Patient seen and examined with HPC Transplant Team:     O: Vitals:   Vital Signs Last 24 Hrs  T(C): 36.7 (2023 05:07), Max: 37.2 (2023 21:00)  T(F): 98.1 (2023 05:07), Max: 99 (2023 21:00)  HR: 97 (2023 05:07) (84 - 102)  BP: 130/80 (2023 05:07) (105/64 - 142/85)  BP(mean): --  RR: 18 (2023 05:07) (18 - 18)  SpO2: 99% (2023 05:07) (98% - 100%)    Parameters below as of 2023 05:07  Patient On (Oxygen Delivery Method): room air      Admit weight: 102.8kg   Daily Weight in k (2023 08:25)  Today's weight:     Intake / Output:    @ 07: @ 07:00  --------------------------------------------------------  IN: 2042.9 mL / OUT: 2100 mL / NET: -57.1 mL     @ 07: @ 07:49  --------------------------------------------------------  IN: 59.1 mL / OUT: 0 mL / NET: 59.1 mL    PE:   Oropharynx: no erythema or ulcers  ENT: tenderness + in maxillary, ethmoidal sinus area  Oral Mucositis: (-)                                                        Grade: -  CVS: RRR, +S1,S2  Lungs: CTA B/L  Abdomen: Soft, NT, ND, +BS  Extremities: No edema in BLE  Gastric Mucositis: (-)                                                 Grade: -  Intestinal Mucositis:   (-)                                           Grade: -  Skin: localized patchy erythematous rash to left medial foot    TLC: CDI  Neuro: Alert, O x 3  Pain: Denies    Labs:                         7.2    1.98  )-----------( 25       ( 2023 07:20 )             21.0           140  |  105  |  9   ----------------------------<  86  3.7   |  24  |  0.71    Ca    9.2      2023 07:10  Phos  4.1       Mg     1.5         TPro  6.4  /  Alb  4.1  /  TBili  0.5  /  DBili  x   /  AST  24  /  ALT  58<H>  /  AlkPhos  91      PT/INR - ( 2023 07:26 )   PT: 13.7 sec;   INR: 1.19 ratio         PTT - ( 2023 07:26 )  PTT:27.2 sec  LIVER FUNCTIONS - ( 2023 07:10 )  Alb: 4.1 g/dL / Pro: 6.4 g/dL / ALK PHOS: 91 U/L / ALT: 58 U/L / AST: 24 U/L / GGT: x           Lactate Dehydrogenase, Serum: 130 U/L ( @ 07:10)    Cultures:   Culture Results:   No growth (06.10.23 @ 22:18)    Culture Results:   No Growth Final (06.10.23 @ 21:45)    Culture Results:   No Growth Final (06.10.23 @ 21:30)    Culture Results:   No Growth Final (06.10.23 @ 07:14)    Radiology:   CT Maxillofacial w/ IV Cont (23 @ 16:48) >  Pansinusitis, severe in the left maxillary and ethmoid sinuses, worsened   since comparison study.    ACC: [id number] EXAM:  XR CHEST PORTABLE URGENT 1V   ORDERED BY:  JACQUE ONEIL   PROCEDURE DATE:  06/10/2023    Impression:  Clear lungs.    Meds:   Antimicrobials:   acyclovir   Oral Tab/Cap 400 milliGRAM(s) Oral every 12 hours  clotrimazole Lozenge 1 Lozenge Oral five times a day  fluconAZOLE   Tablet 400 milliGRAM(s) Oral daily  letermovir 480 milliGRAM(s) Oral daily  piperacillin/tazobactam IVPB.. 4.5 Gram(s) IV Intermittent every 8 hours      Heme / Onc:   heparin  Infusion 424 Unit(s)/Hr IV Continuous <Continuous>      GI:  famotidine    Tablet 20 milliGRAM(s) Oral two times a day  loperamide 2 milliGRAM(s) Oral every 4 hours PRN  senna 1 Tablet(s) Oral at bedtime PRN  sodium bicarbonate Mouth Rinse 10 milliLiter(s) Swish and Spit five times a day  ursodiol Capsule 300 milliGRAM(s) Oral two times a day      Cardiovascular:       Immunologic:   filgrastim-sndz (ZARXIO) Injectable (Chemo) 480 MICROGram(s) SubCutaneous every 24 hours  mycophenolate mofetil 1000 milliGRAM(s) Oral three times a day  tacrolimus 4 milliGRAM(s) Oral <User Schedule>      Other medications:   acetaminophen     Tablet .. 650 milliGRAM(s) Oral every 6 hours  Biotene Dry Mouth Oral Rinse 5 milliLiter(s) Swish and Spit five times a day  Chemotherapy Worklist Order      chlorhexidine 4% Liquid 1 Application(s) Topical <User Schedule>  fluticasone propionate 50 MICROgram(s)/spray Nasal Spray 1 Spray(s) Both Nostrils two times a day  folic acid 1 milliGRAM(s) Oral daily  hydrocortisone sodium succinate Injectable 50 milliGRAM(s) IV Push every 24 hours  Infuse HPC Product      lidocaine/prilocaine Cream (Chemo) 1 Application(s) Topical daily  loratadine 10 milliGRAM(s) Oral daily  magnesium oxide 400 milliGRAM(s) Oral three times a day with meals  magnesium sulfate  IVPB 2 Gram(s) IV Intermittent every 2 hours  multivitamin 1 Tablet(s) Oral daily  sodium bicarbonate  Infusion (Chemo) 0.074 mEq/kG/Hr IV Continuous <Continuous>  sodium chloride 0.65% Nasal 1 Spray(s) Both Nostrils two times a day  sodium chloride 0.9% 1000 milliLiter(s) IV Continuous <Continuous>  sodium chloride 0.9%. 1000 milliLiter(s) IV Continuous <Continuous>  sodium chloride 0.9%. (Chemo) 1000 milliLiter(s) IV Continuous <Continuous>      PRN:   acetaminophen     Tablet .. 650 milliGRAM(s) Oral every 6 hours PRN  acetaminophen 325 mG/butalbital 50 mG/caffeine 40 mG 1 Tablet(s) Oral every 6 hours PRN  dibucaine 1% Ointment 1 Application(s) Topical two times a day PRN  diphenhydrAMINE 50 milliGRAM(s) Oral every 6 hours PRN  guaiFENesin  milliGRAM(s) Oral every 12 hours PRN  hemorrhoidal Ointment 1 Application(s) Rectal three times a day PRN  hydrocortisone sodium succinate Injectable 50 milliGRAM(s) IV Push every 6 hours PRN  loperamide 2 milliGRAM(s) Oral every 4 hours PRN  oxyCODONE    IR 5 milliGRAM(s) Oral every 4 hours PRN  prochlorperazine   Injectable 10 milliGRAM(s) IV Push every 6 hours PRN  pseudoephedrine 30 milliGRAM(s) Oral every 6 hours PRN  senna 1 Tablet(s) Oral at bedtime PRN  sodium chloride 0.9% lock flush 10 milliLiter(s) IV Push every 1 hour PRN  witch hazel Pads 1 Application(s) Topical four times a day PRN      A/P: 37 year old male with severe aplastic anemia with multiple lines of treatment admitted for a haplo-identical bone marrow transplant with  Flu / Cy / TBI preparative regimen   Day + 19  start CTX hydration - 1/2NS @ 200ml /hr    Strict I&O, daily weights, prn diuresis   Day -6 - day -2 - Fludarabine 30mg/m2 IV  Day -6 - day - 5 - CTX 14.5mg/kg/day IV. CTX to start after urine SG < 1.010. Mesna  12mg / kg - hemorrhagic cystitis ppx   Day -2 - IVIG 0.4 g/kg (ideal body weight) every 3 weeks. Premedication with Tylenol and benadryl   Day -1 -  cGy x 1 dose   Day - 1 - at 2200 begin transplant hydration : 0.45Ns + 1 amp (50mEq) sodium bicarbonate at 150ml /hr; continue transplant hydration for 24 hours post infusion    Day + 2 at 2200 - begin CTX hydration (0.45NS + 10 mEq KCl/ @150ml /m2  continue 24 hours post last dose of CTX   Day + 3 - + 4 - CTX 50mg/kg/day IV. Begin CTX when urine SG < 1.010. EKG daily. Mesna for hemorraghic cystitis ppx.   Day + 5 - start Zarxio,  MMF and Tacrolimus. Check Tacrolimus levels on Monday and Thursday.   when ANC < 500, start Cipro 500mg po BID. If becomes febrile, pan cx, CXR and change Cipro to Cefepime 2g IV q 8 hours. Continue until count recovery  - Fludarabine , intermittent nausea, continue antiemetics   - Fludarabine , IVIG   TBI   - HPC transplant today. Continue transplant hydration for 24 hours post infusion of bone marrow   - monitor for CRS / haplo storm - if T >/= 102.5 on days +1 or +2, would dose tocilizumab 8mg / kg IVPB x 1    C.Diff negative, started imodium  - post transplant CTX 2.2 - continue CTX hydration for 24 hours post infusion of last dose , Tocilizumab x1 dose O/N for temp of 39.1.    eye pain overnight, no visual acuity deficits. Opthalmology evaluating - appreciated - optic disc is sharp/pink, overall suggesting against a diagnosis of optic neuritis. Follow up CT maxillofacial/orbits -   - CT maxillofacial / orbits consistent with pansinusitis, continue zosyn, nasal sprays     1. Infectious Disease:   acyclovir   Oral Tab/Cap 400 milliGRAM(s) Oral every 12 hours  clotrimazole Lozenge 1 Lozenge Oral five times a day  fluconAZOLE   Tablet 400 milliGRAM(s) Oral daily  letermovir 480 milliGRAM(s) Oral daily  piperacillin/tazobactam IVPB.. 4.5 Gram(s) IV Intermittent every 8 hours    2. VOD Prophylaxis: Actigall, Glutamine, Heparin (dosed at 100 units / kg / day)     3. GI Prophylaxis:    famotidine    Tablet 20 milliGRAM(s) Oral two times a day    4. Mouthcare - NS / NaHCO3 rinses, Mycelex, Biotene; Skin care     5. GVHD prophylaxis   TBI day -1   CTX days +3, +4   mycophenolate mofetil 1000 milliGRAM(s) Oral three times a day  tacrolimus 4 milliGRAM(s) Oral <User Schedule>    6. Transfuse & replete electrolytes prn   magnesium oxide 400 milliGRAM(s) Oral three times a day with meals  magnesium sulfate  IVPB 2 Gram(s) IV Intermittent every 2 hours    7. IV hydration, daily weights, strict I&O, prn diuresis     8. PO intake as tolerated, nutrition follow up as needed, MVI, folic acid     9. Antiemetics, anti-diarrhea medications:   loperamide 2 milliGRAM(s) Oral every 4 hours PRN  prochlorperazine   Injectable 10 milliGRAM(s) IV Push every 6 hours PRN    10. OOB as tolerated, physical therapy consult if needed     11. Monitor coags / fibrinogen 2x week, vitamin K as needed     12. Monitor closely for clinical changes, monitor for fevers     13. Emotional support provided, plan of care discussed and questions addressed     14. Patient education done regarding plan of care, restrictions and discharge planning     15. Continue regular social work input     I have written the above note for Dr. Lima who performed service with me in the room.   Mila De Guzman NP-C ([phone])    I have seen and examined patient with NP, I agree with above note as scribed.                    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 () with Flu / Cy / TBI prep regimen for treatment of severe aplastic anemia    S: Patient seen and examined with HPC Transplant Team:   + intermittent nausea   + loose stool     O: Vitals:   Vital Signs Last 24 Hrs  T(C): 36.7 (2023 05:07), Max: 37.2 (2023 21:00)  T(F): 98.1 (2023 05:07), Max: 99 (2023 21:00)  HR: 97 (2023 05:07) (84 - 102)  BP: 130/80 (2023 05:07) (105/64 - 142/85)  BP(mean): --  RR: 18 (2023 05:07) (18 - 18)  SpO2: 99% (2023 05:07) (98% - 100%)    Parameters below as of 2023 05:07  Patient On (Oxygen Delivery Method): room air      Admit weight: 102.8kg   Daily Weight in k (2023 08:25)  Today's weight: 95.7kg     Intake / Output:    @ 07: @ 07:00  --------------------------------------------------------  IN: 2042.9 mL / OUT: 2100 mL / NET: -57.1 mL     @ 07: @ 07:49  --------------------------------------------------------  IN: 59.1 mL / OUT: 0 mL / NET: 59.1 mL    PE:   Oropharynx: no erythema or ulcers  ENT: tenderness + in maxillary, ethmoidal sinus area  Oral Mucositis: (-)                                                        Grade: -  CVS: RRR, +S1,S2  Lungs: CTA B/L  Abdomen: Soft, NT, ND, +BS  Extremities: No edema in BLE  Gastric Mucositis: (-)                                                 Grade: -  Intestinal Mucositis:   (-)                                           Grade: -  Skin: localized patchy erythematous rash to left medial foot    TLC: CDI  Neuro: Alert, O x 3  Pain: Denies    Labs:                         7.2    1.98  )-----------(        ( 2023 07:20 )             21.0           140  |  105  |  9   ----------------------------<  86  3.7   |  24  |  0.71    Ca    9.2      2023 07:10  Phos  4.1       Mg     1.5         TPro  6.4  /  Alb  4.1  /  TBili  0.5  /  DBili  x   /  AST  24  /  ALT  58<H>  /  AlkPhos  91      PT/INR - ( 2023 07:26 )   PT: 13.7 sec;   INR: 1.19 ratio         PTT - ( 2023 07:26 )  PTT:27.2 sec  LIVER FUNCTIONS - ( 2023 07:10 )  Alb: 4.1 g/dL / Pro: 6.4 g/dL / ALK PHOS: 91 U/L / ALT: 58 U/L / AST: 24 U/L / GGT: x           Lactate Dehydrogenase, Serum: 130 U/L ( @ 07:10)    Cultures:   Culture Results:   No growth (06.10.23 @ 22:18)    Culture Results:   No Growth Final (06.10.23 @ 21:45)    Culture Results:   No Growth Final (06.10.23 @ 21:30)    Culture Results:   No Growth Final (06.10.23 @ 07:14)    Radiology:   CT Maxillofacial w/ IV Cont (23 @ 16:48) >  Pansinusitis, severe in the left maxillary and ethmoid sinuses, worsened   since comparison study.    ACC: [id number] EXAM:  XR CHEST PORTABLE URGENT 1V   ORDERED BY:  JACQUE ONEIL   PROCEDURE DATE:  06/10/2023    Impression:  Clear lungs.    Meds:   Antimicrobials:   acyclovir   Oral Tab/Cap 400 milliGRAM(s) Oral every 12 hours  clotrimazole Lozenge 1 Lozenge Oral five times a day  fluconAZOLE   Tablet 400 milliGRAM(s) Oral daily  letermovir 480 milliGRAM(s) Oral daily  piperacillin/tazobactam IVPB.. 4.5 Gram(s) IV Intermittent every 8 hours      Heme / Onc:   heparin  Infusion 424 Unit(s)/Hr IV Continuous <Continuous>      GI:  famotidine    Tablet 20 milliGRAM(s) Oral two times a day  loperamide 2 milliGRAM(s) Oral every 4 hours PRN  senna 1 Tablet(s) Oral at bedtime PRN  sodium bicarbonate Mouth Rinse 10 milliLiter(s) Swish and Spit five times a day  ursodiol Capsule 300 milliGRAM(s) Oral two times a day      Cardiovascular:       Immunologic:   filgrastim-sndz (ZARXIO) Injectable (Chemo) 480 MICROGram(s) SubCutaneous every 24 hours  mycophenolate mofetil 1000 milliGRAM(s) Oral three times a day  tacrolimus 4 milliGRAM(s) Oral <User Schedule>      Other medications:   acetaminophen     Tablet .. 650 milliGRAM(s) Oral every 6 hours  Biotene Dry Mouth Oral Rinse 5 milliLiter(s) Swish and Spit five times a day  Chemotherapy Worklist Order      chlorhexidine 4% Liquid 1 Application(s) Topical <User Schedule>  fluticasone propionate 50 MICROgram(s)/spray Nasal Spray 1 Spray(s) Both Nostrils two times a day  folic acid 1 milliGRAM(s) Oral daily  hydrocortisone sodium succinate Injectable 50 milliGRAM(s) IV Push every 24 hours  Infuse HPC Product      lidocaine/prilocaine Cream (Chemo) 1 Application(s) Topical daily  loratadine 10 milliGRAM(s) Oral daily  magnesium oxide 400 milliGRAM(s) Oral three times a day with meals  magnesium sulfate  IVPB 2 Gram(s) IV Intermittent every 2 hours  multivitamin 1 Tablet(s) Oral daily  sodium bicarbonate  Infusion (Chemo) 0.074 mEq/kG/Hr IV Continuous <Continuous>  sodium chloride 0.65% Nasal 1 Spray(s) Both Nostrils two times a day  sodium chloride 0.9% 1000 milliLiter(s) IV Continuous <Continuous>  sodium chloride 0.9%. 1000 milliLiter(s) IV Continuous <Continuous>  sodium chloride 0.9%. (Chemo) 1000 milliLiter(s) IV Continuous <Continuous>      PRN:   acetaminophen     Tablet .. 650 milliGRAM(s) Oral every 6 hours PRN  acetaminophen 325 mG/butalbital 50 mG/caffeine 40 mG 1 Tablet(s) Oral every 6 hours PRN  dibucaine 1% Ointment 1 Application(s) Topical two times a day PRN  diphenhydrAMINE 50 milliGRAM(s) Oral every 6 hours PRN  guaiFENesin  milliGRAM(s) Oral every 12 hours PRN  hemorrhoidal Ointment 1 Application(s) Rectal three times a day PRN  hydrocortisone sodium succinate Injectable 50 milliGRAM(s) IV Push every 6 hours PRN  loperamide 2 milliGRAM(s) Oral every 4 hours PRN  oxyCODONE    IR 5 milliGRAM(s) Oral every 4 hours PRN  prochlorperazine   Injectable 10 milliGRAM(s) IV Push every 6 hours PRN  pseudoephedrine 30 milliGRAM(s) Oral every 6 hours PRN  senna 1 Tablet(s) Oral at bedtime PRN  sodium chloride 0.9% lock flush 10 milliLiter(s) IV Push every 1 hour PRN  witch hazel Pads 1 Application(s) Topical four times a day PRN      A/P: 37 year old male with severe aplastic anemia with multiple lines of treatment admitted for a haplo-identical bone marrow transplant with  Flu / Cy / TBI preparative regimen   Day + 19  start CTX hydration - 1/2NS @ 200ml /hr    Strict I&O, daily weights, prn diuresis   Day -6 - day -2 - Fludarabine 30mg/m2 IV  Day -6 - day - 5 - CTX 14.5mg/kg/day IV. CTX to start after urine SG < 1.010. Mesna  12mg / kg - hemorrhagic cystitis ppx   Day -2 - IVIG 0.4 g/kg (ideal body weight) every 3 weeks. Premedication with Tylenol and benadryl   Day -1 -  cGy x 1 dose   Day - 1 - at 2200 begin transplant hydration : 0.45Ns + 1 amp (50mEq) sodium bicarbonate at 150ml /hr; continue transplant hydration for 24 hours post infusion    Day + 2 at 2200 - begin CTX hydration (0.45NS + 10 mEq KCl/ @150ml /m2  continue 24 hours post last dose of CTX   Day + 3 - + 4 - CTX 50mg/kg/day IV. Begin CTX when urine SG < 1.010. EKG daily. Mesna for hemorraghic cystitis ppx.   Day + 5 - start Zarxio,  MMF and Tacrolimus. Check Tacrolimus levels on Monday and Thursday.   when ANC < 500, start Cipro 500mg po BID. If becomes febrile, pan cx, CXR and change Cipro to Cefepime 2g IV q 8 hours. Continue until count recovery  - Fludarabine , intermittent nausea, continue antiemetics   - Fludarabine , IVIG   TBI   - [facility] transplant today. Continue transplant hydration for 24 hours post infusion of bone marrow   - monitor for CRS / haplo storm - if T >/= 102.5 on days +1 or +2, would dose tocilizumab 8mg / kg IVPB x 1    C.Diff negative, started imodium  - post transplant CTX 2.2 - continue CTX hydration for 24 hours post infusion of last dose , Tocilizumab x1 dose O/N for temp of 39.1.    eye pain overnight, no visual acuity deficits. Opthalmology evaluating - appreciated - optic disc is sharp/pink, overall suggesting against a diagnosis of optic neuritis. Follow up CT maxillofacial/orbits -   - CT maxillofacial / orbits consistent with pansinusitis, continue zosyn, nasal sprays     1. Infectious Disease:   acyclovir   Oral Tab/Cap 400 milliGRAM(s) Oral every 12 hours  clotrimazole Lozenge 1 Lozenge Oral five times a day  fluconAZOLE   Tablet 400 milliGRAM(s) Oral daily  letermovir 480 milliGRAM(s) Oral daily  piperacillin/tazobactam IVPB.. 4.5 Gram(s) IV Intermittent every 8 hours    2. VOD Prophylaxis: Actigall, Glutamine, Heparin (dosed at 100 units / kg / day)     3. GI Prophylaxis:    famotidine    Tablet 20 milliGRAM(s) Oral two times a day    4. Mouthcare - NS / NaHCO3 rinses, Mycelex, Biotene; Skin care     5. GVHD prophylaxis   TBI day -1   CTX days +3, +4   mycophenolate mofetil 1000 milliGRAM(s) Oral three times a day  tacrolimus 4 milliGRAM(s) Oral <User Schedule>    6. Transfuse & replete electrolytes prn   magnesium oxide 400 milliGRAM(s) Oral three times a day with meals  magnesium sulfate  IVPB 2 Gram(s) IV Intermittent every 2 hours    7. IV hydration, daily weights, strict I&O, prn diuresis     8. PO intake as tolerated, nutrition follow up as needed, MVI, folic acid     9. Antiemetics, anti-diarrhea medications:   loperamide 2 milliGRAM(s) Oral every 4 hours PRN  prochlorperazine   Injectable 10 milliGRAM(s) IV Push every 6 hours PRN    10. OOB as tolerated, physical therapy consult if needed     11. Monitor coags / fibrinogen 2x week, vitamin K as needed     12. Monitor closely for clinical changes, monitor for fevers     13. Emotional support provided, plan of care discussed and questions addressed     14. Patient education done regarding plan of care, restrictions and discharge planning     15. Continue regular social work input     I have written the above note for Dr. Lima who performed service with me in the room.   Mila De Guzman NP-C ([phone])    I have seen and examined patient with NP, I agree with above note as scribed.

## 2023-06-27 NOTE — PROGRESS NOTE ADULT - CRITICAL CARE ATTENDING COMMENT
Today is day + 19 s/p  haplo-identical(sister) bone marrow transplant with  Flu / Cy / TBI preparative regimen for severe aplastic anemia.        Admit to [facility]  2. TLC placement in IR   3. Day -6 - day + 5 - antiemetics   4. Day - 6 start CTX hydration - 1/2NS @ 200ml /hr    5. Strict I&O, daily weights, prn diuresis   6. Day -6 - day -2 - Fludarabine 30mg/m2 IV  7. Day -6 - day - 5 - CTX 14.5mg/kg/day IV. CTX to start after urine SG < 1.010. Mesna  12mg / kg - hemorrhagic cystitis ppx   8. Day -2 - IVIG 0.4 g/kg (ideal body weight) every 3 weeks. Premedication with Tylenol and benadryl  9. Day -1 -  cGy x 1 dose   10. Day - 1 - at 2200 begin transplant hydration : 0.45Ns + 1 amp (50mEq) sodium bicarbonate at 150ml /hr; continue transplant hydration for 24 hours post infusion   11. Day 0 – HPC transplant   12. Day + 2 at 2200 - begin CTX hydration (0.45NS + 10 mEq KCl/ @150ml /m2  continue 24 hours post last dose of CTX   13. Day + 3 - + 4 - CTX 50mg/kg/day IV. Begin CTX when urine SG < 1.010. EKG daily. Mesna for hemorraghic cystitis ppx.   14. Day + 5 - start Zarxio,  MMF and Tacrolimus. Check Tacrolimus levels on Monday and Thursday.   15. Anxiolytics, antinausea, antidiarrhea medications as needed   16. Lasix PRN while being aggressively hydrated to avoid VOD   17. Nutritional support, pain management  Need for prophylactic measures:  1. VOD prophylaxis - low dose heparin gtt (dosed at 100 units / kg / day), glutamine supplementation, Actigall BID   2. PCP prophylaxis - Bactrim DS through day -2    3. Antiviral prophylaxis - Continue Acyclovir   4. Antifungal prophylaxis- Diflucan 400 mg po daily.  5.. GI prophylaxis - Protonix po QD   6. Antibacterial prophylaxis -  ANC < 500, started Cipro 500mg po BID. If becomes febrile, pan cx, CXR and change Cipro to Cefepime 2g IV q 8 hours. Continue until count recovery..slight rise in wbc noted after infusion of marrow  7. Aggressive mouth care and skin care as per protocol  8. GVHD prophylaxis- high dose CTX; MMF and Tacrolimus.  9. transfusion and electrolyte support    Patient with hemoglobin drop of ~2  on 6/4, likely due to chemotherapy however will repeat CBC tonight to establish stability, likely will require transfusion.  6/5 describes nausea  6/7 sister collected w/o complication  6/9 toci for haplo storm if t>102.5..will send blood cx on patient...marrow product cx positive for gram pos rods..likely contaminant  6/10 Afebrile, no new complaints.  6/11 Febrile to 100.6F. Cipro switched to cefepime. Infectious work up ordered.  6/12 day 4 ctx today...given toci for haplo storm..some loose bowel, cdiff neg  6/13 mmf, tacro and zarxio.  6/16 overall doing well..on tacro 2 mg..repeat on monday..goal 5 to 10.   6/21- patient developed significant acute left sided facial pressure/pain in sinus region. During last hospitalization he had same symptoms diagnosed as pansinusitis and treated with Unasyn. Will broaden antibiotic coverage to Zosyn IV on 6/20/23; continue Acyclovir, Diflucan. If worsening or persistent symptoms will order CT of sinuses.  6/23- CT maxillofacial(6/22)- Pansinusitis, severe in the left maxillary and ethmoid sinuses- continue Zosyn IV.    6/25 - WBC is increasing to 0.5, which suggests engraftment  Also, his left maxilla and left internal eye gaze pain is improving, especially relative to several days ago.    Risk Statement (NON-critical care).     On this date of service, level of risk to patient is considered: High.     Due to: Patient has aplastic anemia and is being treated with haplo-SCT, which carries a risk for infection, bleeding, and other life-threatening complications.

## 2023-06-28 LAB
ALBUMIN SERPL ELPH-MCNC: 4.2 G/DL — SIGNIFICANT CHANGE UP (ref 3.3–5)
ALP SERPL-CCNC: 92 U/L — SIGNIFICANT CHANGE UP (ref 40–120)
ALT FLD-CCNC: 63 U/L — HIGH (ref 10–45)
ANION GAP SERPL CALC-SCNC: 12 MMOL/L — SIGNIFICANT CHANGE UP (ref 5–17)
AST SERPL-CCNC: 25 U/L — SIGNIFICANT CHANGE UP (ref 10–40)
BASOPHILS # BLD AUTO: 0.02 K/UL — SIGNIFICANT CHANGE UP (ref 0–0.2)
BASOPHILS NFR BLD AUTO: 0.8 % — SIGNIFICANT CHANGE UP (ref 0–2)
BILIRUB DIRECT SERPL-MCNC: <0.1 MG/DL — SIGNIFICANT CHANGE UP (ref 0–0.3)
BILIRUB INDIRECT FLD-MCNC: >0.3 MG/DL — SIGNIFICANT CHANGE UP (ref 0.2–1)
BILIRUB SERPL-MCNC: 0.4 MG/DL — SIGNIFICANT CHANGE UP (ref 0.2–1.2)
BLD GP AB SCN SERPL QL: NEGATIVE — SIGNIFICANT CHANGE UP
BUN SERPL-MCNC: 10 MG/DL — SIGNIFICANT CHANGE UP (ref 7–23)
CALCIUM SERPL-MCNC: 9 MG/DL — SIGNIFICANT CHANGE UP (ref 8.4–10.5)
CHLORIDE SERPL-SCNC: 107 MMOL/L — SIGNIFICANT CHANGE UP (ref 96–108)
CMV DNA CSF QL NAA+PROBE: SIGNIFICANT CHANGE UP IU/ML
CMV DNA SPEC NAA+PROBE-LOG#: SIGNIFICANT CHANGE UP LOG10IU/ML
CO2 SERPL-SCNC: 25 MMOL/L — SIGNIFICANT CHANGE UP (ref 22–31)
CREAT SERPL-MCNC: 0.76 MG/DL — SIGNIFICANT CHANGE UP (ref 0.5–1.3)
EGFR: 120 ML/MIN/1.73M2 — SIGNIFICANT CHANGE UP
EOSINOPHIL # BLD AUTO: 0 K/UL — SIGNIFICANT CHANGE UP (ref 0–0.5)
EOSINOPHIL NFR BLD AUTO: 0 % — SIGNIFICANT CHANGE UP (ref 0–6)
GLUCOSE SERPL-MCNC: 86 MG/DL — SIGNIFICANT CHANGE UP (ref 70–99)
HCT VFR BLD CALC: 20.3 % — CRITICAL LOW (ref 39–50)
HGB BLD-MCNC: 7 G/DL — CRITICAL LOW (ref 13–17)
LDH SERPL L TO P-CCNC: 124 U/L — SIGNIFICANT CHANGE UP (ref 50–242)
LYMPHOCYTES # BLD AUTO: 0 % — LOW (ref 13–44)
LYMPHOCYTES # BLD AUTO: 0 K/UL — LOW (ref 1–3.3)
MAGNESIUM SERPL-MCNC: 1.4 MG/DL — LOW (ref 1.6–2.6)
MANUAL SMEAR VERIFICATION: SIGNIFICANT CHANGE UP
MCHC RBC-ENTMCNC: 30.7 PG — SIGNIFICANT CHANGE UP (ref 27–34)
MCHC RBC-ENTMCNC: 34.5 GM/DL — SIGNIFICANT CHANGE UP (ref 32–36)
MCV RBC AUTO: 89 FL — SIGNIFICANT CHANGE UP (ref 80–100)
MONOCYTES # BLD AUTO: 0.66 K/UL — SIGNIFICANT CHANGE UP (ref 0–0.9)
MONOCYTES NFR BLD AUTO: 22.2 % — HIGH (ref 2–14)
MYELOCYTES NFR BLD: 0.9 % — HIGH (ref 0–0)
NEUTROPHILS # BLD AUTO: 2.28 K/UL — SIGNIFICANT CHANGE UP (ref 1.8–7.4)
NEUTROPHILS NFR BLD AUTO: 71.8 % — SIGNIFICANT CHANGE UP (ref 43–77)
NEUTS BAND # BLD: 4.3 % — SIGNIFICANT CHANGE UP (ref 0–8)
NRBC # BLD: 1 /100 — HIGH (ref 0–0)
PHOSPHATE SERPL-MCNC: 4.7 MG/DL — HIGH (ref 2.5–4.5)
PLAT MORPH BLD: NORMAL — SIGNIFICANT CHANGE UP
PLATELET # BLD AUTO: 20 K/UL — CRITICAL LOW (ref 150–400)
POTASSIUM SERPL-MCNC: 3.9 MMOL/L — SIGNIFICANT CHANGE UP (ref 3.5–5.3)
POTASSIUM SERPL-SCNC: 3.9 MMOL/L — SIGNIFICANT CHANGE UP (ref 3.5–5.3)
PROT SERPL-MCNC: 6.1 G/DL — SIGNIFICANT CHANGE UP (ref 6–8.3)
RBC # BLD: 2.28 M/UL — LOW (ref 4.2–5.8)
RBC # FLD: 12.6 % — SIGNIFICANT CHANGE UP (ref 10.3–14.5)
RBC BLD AUTO: SIGNIFICANT CHANGE UP
RH IG SCN BLD-IMP: POSITIVE — SIGNIFICANT CHANGE UP
SODIUM SERPL-SCNC: 144 MMOL/L — SIGNIFICANT CHANGE UP (ref 135–145)
WBC # BLD: 2.99 K/UL — LOW (ref 3.8–10.5)
WBC # FLD AUTO: 2.99 K/UL — LOW (ref 3.8–10.5)

## 2023-06-28 PROCEDURE — 99233 SBSQ HOSP IP/OBS HIGH 50: CPT

## 2023-06-28 RX ORDER — MAGNESIUM SULFATE 500 MG/ML
2 VIAL (ML) INJECTION ONCE
Refills: 0 | Status: COMPLETED | OUTPATIENT
Start: 2023-06-28 | End: 2023-06-28

## 2023-06-28 RX ADMIN — TACROLIMUS 4 MILLIGRAM(S): 5 CAPSULE ORAL at 08:09

## 2023-06-28 RX ADMIN — MAGNESIUM OXIDE 400 MG ORAL TABLET 400 MILLIGRAM(S): 241.3 TABLET ORAL at 11:24

## 2023-06-28 RX ADMIN — Medication 10 MILLILITER(S): at 20:28

## 2023-06-28 RX ADMIN — MAGNESIUM OXIDE 400 MG ORAL TABLET 400 MILLIGRAM(S): 241.3 TABLET ORAL at 08:07

## 2023-06-28 RX ADMIN — FLUCONAZOLE 400 MILLIGRAM(S): 150 TABLET ORAL at 11:26

## 2023-06-28 RX ADMIN — Medication 5 MILLILITER(S): at 20:26

## 2023-06-28 RX ADMIN — Medication 1 LOZENGE: at 20:27

## 2023-06-28 RX ADMIN — Medication 1 SPRAY(S): at 06:17

## 2023-06-28 RX ADMIN — TACROLIMUS 4 MILLIGRAM(S): 5 CAPSULE ORAL at 20:43

## 2023-06-28 RX ADMIN — PIPERACILLIN AND TAZOBACTAM 25 GRAM(S): 4; .5 INJECTION, POWDER, LYOPHILIZED, FOR SOLUTION INTRAVENOUS at 12:31

## 2023-06-28 RX ADMIN — Medication 10 MILLILITER(S): at 11:22

## 2023-06-28 RX ADMIN — Medication 5 MILLILITER(S): at 11:20

## 2023-06-28 RX ADMIN — Medication 400 MILLIGRAM(S): at 06:01

## 2023-06-28 RX ADMIN — PIPERACILLIN AND TAZOBACTAM 25 GRAM(S): 4; .5 INJECTION, POWDER, LYOPHILIZED, FOR SOLUTION INTRAVENOUS at 05:59

## 2023-06-28 RX ADMIN — Medication 1 SPRAY(S): at 17:20

## 2023-06-28 RX ADMIN — MYCOPHENOLATE MOFETIL 1000 MILLIGRAM(S): 250 CAPSULE ORAL at 14:04

## 2023-06-28 RX ADMIN — Medication 400 MILLIGRAM(S): at 17:19

## 2023-06-28 RX ADMIN — FAMOTIDINE 20 MILLIGRAM(S): 10 INJECTION INTRAVENOUS at 17:19

## 2023-06-28 RX ADMIN — Medication 10 MILLILITER(S): at 08:06

## 2023-06-28 RX ADMIN — LETERMOVIR 480 MILLIGRAM(S): 480 TABLET, FILM COATED ORAL at 11:28

## 2023-06-28 RX ADMIN — Medication 1 LOZENGE: at 11:22

## 2023-06-28 RX ADMIN — Medication 5 MILLILITER(S): at 15:01

## 2023-06-28 RX ADMIN — URSODIOL 300 MILLIGRAM(S): 250 TABLET, FILM COATED ORAL at 17:14

## 2023-06-28 RX ADMIN — PIPERACILLIN AND TAZOBACTAM 25 GRAM(S): 4; .5 INJECTION, POWDER, LYOPHILIZED, FOR SOLUTION INTRAVENOUS at 21:45

## 2023-06-28 RX ADMIN — MYCOPHENOLATE MOFETIL 1000 MILLIGRAM(S): 250 CAPSULE ORAL at 06:00

## 2023-06-28 RX ADMIN — Medication 1 LOZENGE: at 08:09

## 2023-06-28 RX ADMIN — LORATADINE 10 MILLIGRAM(S): 10 TABLET ORAL at 11:30

## 2023-06-28 RX ADMIN — MAGNESIUM OXIDE 400 MG ORAL TABLET 400 MILLIGRAM(S): 241.3 TABLET ORAL at 17:13

## 2023-06-28 RX ADMIN — Medication 10 MILLILITER(S): at 15:01

## 2023-06-28 RX ADMIN — CHLORHEXIDINE GLUCONATE 1 APPLICATION(S): 213 SOLUTION TOPICAL at 08:11

## 2023-06-28 RX ADMIN — SODIUM CHLORIDE 20 MILLILITER(S): 9 INJECTION INTRAMUSCULAR; INTRAVENOUS; SUBCUTANEOUS at 06:02

## 2023-06-28 RX ADMIN — MYCOPHENOLATE MOFETIL 1000 MILLIGRAM(S): 250 CAPSULE ORAL at 21:47

## 2023-06-28 RX ADMIN — Medication 2 MILLIGRAM(S): at 21:47

## 2023-06-28 RX ADMIN — Medication 1 TABLET(S): at 11:26

## 2023-06-28 RX ADMIN — FAMOTIDINE 20 MILLIGRAM(S): 10 INJECTION INTRAVENOUS at 06:00

## 2023-06-28 RX ADMIN — URSODIOL 300 MILLIGRAM(S): 250 TABLET, FILM COATED ORAL at 06:01

## 2023-06-28 RX ADMIN — Medication 2 MILLIGRAM(S): at 11:31

## 2023-06-28 RX ADMIN — Medication 5 MILLILITER(S): at 08:09

## 2023-06-28 RX ADMIN — Medication 1 LOZENGE: at 15:01

## 2023-06-28 RX ADMIN — Medication 480 MICROGRAM(S): at 12:31

## 2023-06-28 RX ADMIN — Medication 25 GRAM(S): at 08:32

## 2023-06-28 RX ADMIN — Medication 1 MILLIGRAM(S): at 11:26

## 2023-06-28 NOTE — PROGRESS NOTE ADULT - SUBJECTIVE AND OBJECTIVE BOX
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 () with Flu / Cy / TBI prep regimen for treatment of severe aplastic anemia    S: Patient seen and examined with HPC Transplant Team:       O:    Vital Signs Last 24 Hrs  T(C): 36.9 (2023 09:25), Max: 37.1 (2023 21:35)  T(F): 98.4 (2023 09:25), Max: 98.7 (2023 21:35)  HR: 89 (2023 09:25) (88 - 102)  BP: 112/69 (2023 09:25) (101/58 - 126/73)  BP(mean): --  RR: 17 (2023 09:25) (17 - 18)  SpO2: 99% (:25) (97% - 99%)    Parameters below as of 2023 09:25  Patient On (Oxygen Delivery Method): room air        Admit weight: 102.8kg  Daily Weight in k.8 (2023 08:12)    Intake / Output:    @ 07:01  -   @ 07:00  --------------------------------------------------------  IN: 2085.3 mL / OUT: 1000 mL / NET: 1085.3 mL    PE:   Oropharynx: no erythema or ulcers  ENT: tenderness + in maxillary, ethmoidal sinus area  Oral Mucositis: (-)                                                        Grade: -  CVS: RRR, +S1,S2  Lungs: CTA B/L  Abdomen: Soft, NT, ND, +BS  Extremities: No edema in BLE  Gastric Mucositis: (-)                                                 Grade: -  Intestinal Mucositis:   (-)                                           Grade: -  Skin: localized patchy erythematous rash to left medial foot    TLC: CDI  Neuro: Alert, O x 3  Pain: Denies      Labs:   CBC Full  -  ( 2023 06:54 )  WBC Count : 2.99 K/uL  Hemoglobin : 7.0 g/dL  Hematocrit : 20.3 %  Platelet Count - Automated : 20 K/uL  Mean Cell Volume : 89.0 fl  Mean Cell Hemoglobin : 30.7 pg  Mean Cell Hemoglobin Concentration : 34.5 gm/dL  Auto Neutrophil # : 2.28 K/uL  Auto Lymphocyte # : 0.00 K/uL  Auto Monocyte # : 0.66 K/uL  Auto Eosinophil # : 0.00 K/uL  Auto Basophil # : 0.02 K/uL  Auto Neutrophil % : 71.8 %  Auto Lymphocyte % : 0.0 %  Auto Monocyte % : 22.2 %  Auto Eosinophil % : 0.0 %  Auto Basophil % : 0.8 %                          7.0    2.99  )-----------( 20       ( 2023 06:54 )             20.3         144  |  107  |  10  ----------------------------<  86  3.9   |  25  |  0.76    Ca    9.0      2023 06:54  Phos  4.7       Mg     1.4         TPro  6.1  /  Alb  4.2  /  TBili  0.4  /  DBili  <0.1  /  AST  25  /  ALT  63<H>  /  AlkPhos  92        LIVER FUNCTIONS - ( 2023 06:54 )  Alb: 4.2 g/dL / Pro: 6.1 g/dL / ALK PHOS: 92 U/L / ALT: 63 U/L / AST: 25 U/L / GGT: x           Lactate Dehydrogenase, Serum: 124 U/L ( @ 06:54)          Cultures:         Radiology:       Meds:   Antimicrobials:   acyclovir   Oral Tab/Cap 400 milliGRAM(s) Oral every 12 hours  clotrimazole Lozenge 1 Lozenge Oral five times a day  fluconAZOLE   Tablet 400 milliGRAM(s) Oral daily  letermovir 480 milliGRAM(s) Oral daily  piperacillin/tazobactam IVPB.. 4.5 Gram(s) IV Intermittent every 8 hours      Heme / Onc:   heparin  Infusion 424 Unit(s)/Hr IV Continuous <Continuous>      GI:  famotidine    Tablet 20 milliGRAM(s) Oral two times a day  loperamide 2 milliGRAM(s) Oral every 4 hours PRN  senna 1 Tablet(s) Oral at bedtime PRN  sodium bicarbonate Mouth Rinse 10 milliLiter(s) Swish and Spit five times a day  ursodiol Capsule 300 milliGRAM(s) Oral two times a day      Cardiovascular:       Immunologic:   filgrastim-sndz (ZARXIO) Injectable (Chemo) 480 MICROGram(s) SubCutaneous every 24 hours  mycophenolate mofetil 1000 milliGRAM(s) Oral three times a day  tacrolimus 4 milliGRAM(s) Oral <User Schedule>      Other medications:   acetaminophen     Tablet .. 650 milliGRAM(s) Oral every 6 hours  Biotene Dry Mouth Oral Rinse 5 milliLiter(s) Swish and Spit five times a day  Chemotherapy Worklist Order      chlorhexidine 4% Liquid 1 Application(s) Topical <User Schedule>  fluticasone propionate 50 MICROgram(s)/spray Nasal Spray 1 Spray(s) Both Nostrils two times a day  folic acid 1 milliGRAM(s) Oral daily  hydrocortisone sodium succinate Injectable 50 milliGRAM(s) IV Push every 24 hours  Infuse HPC Product      lidocaine/prilocaine Cream (Chemo) 1 Application(s) Topical daily  loratadine 10 milliGRAM(s) Oral daily  magnesium oxide 400 milliGRAM(s) Oral three times a day with meals  multivitamin 1 Tablet(s) Oral daily  sodium bicarbonate  Infusion (Chemo) 0.074 mEq/kG/Hr IV Continuous <Continuous>  sodium chloride 0.65% Nasal 1 Spray(s) Both Nostrils two times a day  sodium chloride 0.9% 1000 milliLiter(s) IV Continuous <Continuous>  sodium chloride 0.9%. 1000 milliLiter(s) IV Continuous <Continuous>  sodium chloride 0.9%. (Chemo) 1000 milliLiter(s) IV Continuous <Continuous>      PRN:   acetaminophen     Tablet .. 650 milliGRAM(s) Oral every 6 hours PRN  acetaminophen 325 mG/butalbital 50 mG/caffeine 40 mG 1 Tablet(s) Oral every 6 hours PRN  dibucaine 1% Ointment 1 Application(s) Topical two times a day PRN  diphenhydrAMINE 50 milliGRAM(s) Oral every 6 hours PRN  guaiFENesin  milliGRAM(s) Oral every 12 hours PRN  hemorrhoidal Ointment 1 Application(s) Rectal three times a day PRN  hydrocortisone sodium succinate Injectable 50 milliGRAM(s) IV Push every 6 hours PRN  loperamide 2 milliGRAM(s) Oral every 4 hours PRN  oxyCODONE    IR 5 milliGRAM(s) Oral every 4 hours PRN  prochlorperazine   Injectable 10 milliGRAM(s) IV Push every 6 hours PRN  pseudoephedrine 30 milliGRAM(s) Oral every 6 hours PRN  senna 1 Tablet(s) Oral at bedtime PRN  sodium chloride 0.9% lock flush 10 milliLiter(s) IV Push every 1 hour PRN  witch hazel Pads 1 Application(s) Topical four times a day PRN        A/P: 37 year old male with severe aplastic anemia with multiple lines of treatment admitted for a haplo-identical bone marrow transplant with  Flu / Cy / TBI preparative regimen   Day + 20  start CTX hydration - 1/2NS @ 200ml /hr    Strict I&O, daily weights, prn diuresis   Day -6 - day -2 - Fludarabine 30mg/m2 IV  Day -6 - day - 5 - CTX 14.5mg/kg/day IV. CTX to start after urine SG < 1.010. Mesna  12mg / kg - hemorrhagic cystitis ppx   Day -2 - IVIG 0.4 g/kg (ideal body weight) every 3 weeks. Premedication with Tylenol and benadryl   Day -1 -  cGy x 1 dose   Day - 1 - at 2200 begin transplant hydration : 0.45Ns + 1 amp (50mEq) sodium bicarbonate at 150ml /hr; continue transplant hydration for 24 hours post infusion    Day + 2 at 2200 - begin CTX hydration (0.45NS + 10 mEq KCl/ @150ml /m2  continue 24 hours post last dose of CTX   Day + 3 - + 4 - CTX 50mg/kg/day IV. Begin CTX when urine SG < 1.010. EKG daily. Mesna for hemorraghic cystitis ppx.   Day + 5 - start Zarxio,  MMF and Tacrolimus. Check Tacrolimus levels on Monday and Thursday.   when ANC < 500, start Cipro 500mg po BID. If becomes febrile, pan cx, CXR and change Cipro to Cefepime 2g IV q 8 hours. Continue until count recovery  /- Fludarabine 4/, intermittent nausea, continue antiemetics   - Fludarabine , IVIG  6/7 TBI   - HPC transplant today. Continue transplant hydration for 24 hours post infusion of bone marrow   - monitor for CRS / haplo storm - if T >/= 102.5 on days +1 or +2, would dose tocilizumab 8mg / kg IVPB x 1    C.Diff negative, started imodium  - post transplant CTX 2.2 - continue CTX hydration for 24 hours post infusion of last dose , Tocilizumab x1 dose O/N for temp of 39.1.    eye pain overnight, no visual acuity deficits. Opthalmology evaluating - appreciated - optic disc is sharp/pink, overall suggesting against a diagnosis of optic neuritis. Follow up CT maxillofacial/orbits -   - CT maxillofacial / orbits consistent with pansinusitis, continue zosyn, nasal sprays     1. Infectious Disease:   acyclovir   Oral Tab/Cap 400 milliGRAM(s) Oral every 12 hours  clotrimazole Lozenge 1 Lozenge Oral five times a day  fluconAZOLE   Tablet 400 milliGRAM(s) Oral daily  letermovir 480 milliGRAM(s) Oral daily  piperacillin/tazobactam IVPB.. 4.5 Gram(s) IV Intermittent every 8 hours    2. VOD Prophylaxis: Actigall, Glutamine, Heparin (dosed at 100 units / kg / day)     3. GI Prophylaxis:    famotidine    Tablet 20 milliGRAM(s) Oral two times a day    4. Mouthcare - NS / NaHCO3 rinses, Mycelex, Biotene; Skin care     5. GVHD prophylaxis   TBI day -1   CTX days +3, +4   mycophenolate mofetil 1000 milliGRAM(s) Oral three times a day  tacrolimus 4 milliGRAM(s) Oral <User Schedule>    6. Transfuse & replete electrolytes prn   magnesium oxide 400 milliGRAM(s) Oral three times a day with meals  magnesium sulfate  IVPB 2 Gram(s) IV Intermittent every 2 hours    7. IV hydration, daily weights, strict I&O, prn diuresis     8. PO intake as tolerated, nutrition follow up as needed, MVI, folic acid     9. Antiemetics, anti-diarrhea medications:   loperamide 2 milliGRAM(s) Oral every 4 hours PRN  prochlorperazine   Injectable 10 milliGRAM(s) IV Push every 6 hours PRN    10. OOB as tolerated, physical therapy consult if needed     11. Monitor coags / fibrinogen 2x week, vitamin K as needed     12. Monitor closely for clinical changes, monitor for fevers     13. Emotional support provided, plan of care discussed and questions addressed     14. Patient education done regarding plan of care, restrictions and discharge planning     15. Continue regular social work input     I have written the above note for Dr. Lima who performed service with me in the room.   Mila De Guzman NP-C ([phone])    I have seen and examined patient with NP, I agree with above note as scribed.                  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 () with Flu / Cy / TBI prep regimen for treatment of severe aplastic anemia    S: Patient seen and examined with HPC Transplant Team: No overnight events  L sinus pain (resolved)    O: rest of ROS negative    Vital Signs Last 24 Hrs  T(C): 36.9 (2023 09:25), Max: 37.1 (2023 21:35)  T(F): 98.4 (2023 09:25), Max: 98.7 (2023 21:35)  HR: 89 (2023 09:25) (88 - 102)  BP: 112/69 (2023 09:25) (101/58 - 126/73)  BP(mean): --  RR: 17 (2023 09:25) (17 - 18)  SpO2: 99% (2023 09:25) (97% - 99%)    Parameters below as of 2023 09:25  Patient On (Oxygen Delivery Method): room air        Admit weight: 102.8kg  Daily Weight in k.8 (2023 08:12)    Intake / Output:    @ 07:01  -   @ 07:00  --------------------------------------------------------  IN: 2085.3 mL / OUT: 1000 mL / NET: 1085.3 mL    PE:   Oropharynx: no erythema or ulcers  ENT: tenderness + in maxillary, ethmoidal sinus area  Oral Mucositis: (-)                                                        Grade: -  CVS: RRR, +S1,S2  Lungs: CTA B/L  Abdomen: Soft, NT, ND, +BS  Extremities: No edema in BLE  Gastric Mucositis: (-)                                                 Grade: -  Intestinal Mucositis:   (-)                                           Grade: -  Skin: localized patchy erythematous rash to left medial foot    TLC: CDI  Neuro: Alert, O x 3  Pain: Denies      Labs:   CBC Full  -  ( 2023 06:54 )  WBC Count : 2.99 K/uL  Hemoglobin : 7.0 g/dL  Hematocrit : 20.3 %  Platelet Count - Automated : 20 K/uL  Mean Cell Volume : 89.0 fl  Mean Cell Hemoglobin : 30.7 pg  Mean Cell Hemoglobin Concentration : 34.5 gm/dL  Auto Neutrophil # : 2.28 K/uL  Auto Lymphocyte # : 0.00 K/uL  Auto Monocyte # : 0.66 K/uL  Auto Eosinophil # : 0.00 K/uL  Auto Basophil # : 0.02 K/uL  Auto Neutrophil % : 71.8 %  Auto Lymphocyte % : 0.0 %  Auto Monocyte % : 22.2 %  Auto Eosinophil % : 0.0 %  Auto Basophil % : 0.8 %                          7.0    2.99  )-----------( 20       ( 2023 06:54 )             20.3         144  |  107  |  10  ----------------------------<  86  3.9   |  25  |  0.76    Ca    9.0      2023 06:54  Phos  4.7       Mg     1.4         TPro  6.1  /  Alb  4.2  /  TBili  0.4  /  DBili  <0.1  /  AST  25  /  ALT  63<H>  /  AlkPhos  92        LIVER FUNCTIONS - ( 2023 06:54 )  Alb: 4.2 g/dL / Pro: 6.1 g/dL / ALK PHOS: 92 U/L / ALT: 63 U/L / AST: 25 U/L / GGT: x           Lactate Dehydrogenase, Serum: 124 U/L ( @ 06:54)        Cultures:       Radiology:       Meds:   Antimicrobials:   acyclovir   Oral Tab/Cap 400 milliGRAM(s) Oral every 12 hours  clotrimazole Lozenge 1 Lozenge Oral five times a day  fluconAZOLE   Tablet 400 milliGRAM(s) Oral daily  letermovir 480 milliGRAM(s) Oral daily  piperacillin/tazobactam IVPB.. 4.5 Gram(s) IV Intermittent every 8 hours      Heme / Onc:   heparin  Infusion 424 Unit(s)/Hr IV Continuous <Continuous>      GI:  famotidine    Tablet 20 milliGRAM(s) Oral two times a day  loperamide 2 milliGRAM(s) Oral every 4 hours PRN  senna 1 Tablet(s) Oral at bedtime PRN  sodium bicarbonate Mouth Rinse 10 milliLiter(s) Swish and Spit five times a day  ursodiol Capsule 300 milliGRAM(s) Oral two times a day      Cardiovascular:       Immunologic:   filgrastim-sndz (ZARXIO) Injectable (Chemo) 480 MICROGram(s) SubCutaneous every 24 hours  mycophenolate mofetil 1000 milliGRAM(s) Oral three times a day  tacrolimus 4 milliGRAM(s) Oral <User Schedule>      Other medications:   acetaminophen     Tablet .. 650 milliGRAM(s) Oral every 6 hours  Biotene Dry Mouth Oral Rinse 5 milliLiter(s) Swish and Spit five times a day  Chemotherapy Worklist Order      chlorhexidine 4% Liquid 1 Application(s) Topical <User Schedule>  fluticasone propionate 50 MICROgram(s)/spray Nasal Spray 1 Spray(s) Both Nostrils two times a day  folic acid 1 milliGRAM(s) Oral daily  hydrocortisone sodium succinate Injectable 50 milliGRAM(s) IV Push every 24 hours  Infuse HPC Product      lidocaine/prilocaine Cream (Chemo) 1 Application(s) Topical daily  loratadine 10 milliGRAM(s) Oral daily  magnesium oxide 400 milliGRAM(s) Oral three times a day with meals  multivitamin 1 Tablet(s) Oral daily  sodium bicarbonate  Infusion (Chemo) 0.074 mEq/kG/Hr IV Continuous <Continuous>  sodium chloride 0.65% Nasal 1 Spray(s) Both Nostrils two times a day  sodium chloride 0.9% 1000 milliLiter(s) IV Continuous <Continuous>  sodium chloride 0.9%. 1000 milliLiter(s) IV Continuous <Continuous>  sodium chloride 0.9%. (Chemo) 1000 milliLiter(s) IV Continuous <Continuous>      PRN:   acetaminophen     Tablet .. 650 milliGRAM(s) Oral every 6 hours PRN  acetaminophen 325 mG/butalbital 50 mG/caffeine 40 mG 1 Tablet(s) Oral every 6 hours PRN  dibucaine 1% Ointment 1 Application(s) Topical two times a day PRN  diphenhydrAMINE 50 milliGRAM(s) Oral every 6 hours PRN  guaiFENesin  milliGRAM(s) Oral every 12 hours PRN  hemorrhoidal Ointment 1 Application(s) Rectal three times a day PRN  hydrocortisone sodium succinate Injectable 50 milliGRAM(s) IV Push every 6 hours PRN  loperamide 2 milliGRAM(s) Oral every 4 hours PRN  oxyCODONE    IR 5 milliGRAM(s) Oral every 4 hours PRN  prochlorperazine   Injectable 10 milliGRAM(s) IV Push every 6 hours PRN  pseudoephedrine 30 milliGRAM(s) Oral every 6 hours PRN  senna 1 Tablet(s) Oral at bedtime PRN  sodium chloride 0.9% lock flush 10 milliLiter(s) IV Push every 1 hour PRN  witch hazel Pads 1 Application(s) Topical four times a day PRN        A/P: 37 year old male with severe aplastic anemia with multiple lines of treatment admitted for a haplo-identical bone marrow transplant with  Flu / Cy / TBI preparative regimen   Day + 20  start CTX hydration - 1/2NS @ 200ml /hr    Strict I&O, daily weights, prn diuresis   Day -6 - day -2 - Fludarabine 30mg/m2 IV  Day -6 - day - 5 - CTX 14.5mg/kg/day IV. CTX to start after urine SG < 1.010. Mesna  12mg / kg - hemorrhagic cystitis ppx   Day -2 - IVIG 0.4 g/kg (ideal body weight) every 3 weeks. Premedication with Tylenol and benadryl   Day -1 -  cGy x 1 dose   Day - 1 - at 2200 begin transplant hydration : 0.45Ns + 1 amp (50mEq) sodium bicarbonate at 150ml /hr; continue transplant hydration for 24 hours post infusion    Day + 2 at 2200 - begin CTX hydration (0.45NS + 10 mEq KCl/ @150ml /m2  continue 24 hours post last dose of CTX   Day + 3 - + 4 - CTX 50mg/kg/day IV. Begin CTX when urine SG < 1.010. EKG daily. Mesna for hemorraghic cystitis ppx.   Day + 5 - start Zarxio,  MMF and Tacrolimus. Check Tacrolimus levels on Monday and Thursday.   when ANC < 500, start Cipro 500mg po BID. If becomes febrile, pan cx, CXR and change Cipro to Cefepime 2g IV q 8 hours. Continue until count recovery  /5- Fludarabine 4/5, intermittent nausea, continue antiemetics   /- Fludarabine 5/5, IVIG  6/7 TBI   - HPC transplant today. Continue transplant hydration for 24 hours post infusion of bone marrow   - monitor for CRS / haplo storm - if T >/= 102.5 on days +1 or +2, would dose tocilizumab 8mg / kg IVPB x 1    C.Diff negative, started imodium  - post transplant CTX 2.2 - continue CTX hydration for 24 hours post infusion of last dose , Tocilizumab x1 dose O/N for temp of 39.1.    eye pain overnight, no visual acuity deficits. Opthalmology evaluating - appreciated - optic disc is sharp/pink, overall suggesting against a diagnosis of optic neuritis. Follow up CT maxillofacial/orbits -   - CT maxillofacial / orbits consistent with pansinusitis, continue zosyn, nasal sprays     1. Infectious Disease:   acyclovir   Oral Tab/Cap 400 milliGRAM(s) Oral every 12 hours  clotrimazole Lozenge 1 Lozenge Oral five times a day  fluconAZOLE   Tablet 400 milliGRAM(s) Oral daily  letermovir 480 milliGRAM(s) Oral daily  piperacillin/tazobactam IVPB.. 4.5 Gram(s) IV Intermittent every 8 hours    2. VOD Prophylaxis: Actigall, Glutamine, Heparin (dosed at 100 units / kg / day)     3. GI Prophylaxis:    famotidine    Tablet 20 milliGRAM(s) Oral two times a day    4. Mouthcare - NS / NaHCO3 rinses, Mycelex, Biotene; Skin care     5. GVHD prophylaxis   TBI day -1   CTX days +3, +4   mycophenolate mofetil 1000 milliGRAM(s) Oral three times a day  tacrolimus 4 milliGRAM(s) Oral <User Schedule>    6. Transfuse & replete electrolytes prn   Replete Mg magnesium sulfate  IVPB 2 Gram(s) IV Intermittent every 2 hours    7. IV hydration, daily weights, strict I&O, prn diuresis     8. PO intake as tolerated, nutrition follow up as needed, MVI, folic acid     9. Antiemetics, anti-diarrhea medications:   loperamide 2 milliGRAM(s) Oral every 4 hours PRN  prochlorperazine   Injectable 10 milliGRAM(s) IV Push every 6 hours PRN    10. OOB as tolerated, physical therapy consult if needed     11. Monitor coags / fibrinogen 2x week, vitamin K as needed     12. Monitor closely for clinical changes, monitor for fevers     13. Emotional support provided, plan of care discussed and questions addressed     14. Patient education done regarding plan of care, restrictions and discharge planning     15. Continue regular social work input     I have written the above note for Dr. Lima who performed service with me in the room.   Axel Goncalves PA-C ([phone])    I have seen and examined patient with PA, I agree with above note as scribed.

## 2023-06-29 LAB
ALBUMIN SERPL ELPH-MCNC: 4.2 G/DL — SIGNIFICANT CHANGE UP (ref 3.3–5)
ALP SERPL-CCNC: 105 U/L — SIGNIFICANT CHANGE UP (ref 40–120)
ALT FLD-CCNC: 77 U/L — HIGH (ref 10–45)
ANION GAP SERPL CALC-SCNC: 12 MMOL/L — SIGNIFICANT CHANGE UP (ref 5–17)
APTT BLD: 30.1 SEC — SIGNIFICANT CHANGE UP (ref 27.5–35.5)
AST SERPL-CCNC: 30 U/L — SIGNIFICANT CHANGE UP (ref 10–40)
BASOPHILS # BLD AUTO: 0 K/UL — SIGNIFICANT CHANGE UP (ref 0–0.2)
BASOPHILS NFR BLD AUTO: 0 % — SIGNIFICANT CHANGE UP (ref 0–2)
BILIRUB SERPL-MCNC: 0.4 MG/DL — SIGNIFICANT CHANGE UP (ref 0.2–1.2)
BUN SERPL-MCNC: 8 MG/DL — SIGNIFICANT CHANGE UP (ref 7–23)
CALCIUM SERPL-MCNC: 9.3 MG/DL — SIGNIFICANT CHANGE UP (ref 8.4–10.5)
CHLORIDE SERPL-SCNC: 108 MMOL/L — SIGNIFICANT CHANGE UP (ref 96–108)
CHROM ANALY INTERPHASE BLD FISH-IMP: SIGNIFICANT CHANGE UP
CMV DNA CSF QL NAA+PROBE: SIGNIFICANT CHANGE UP IU/ML
CMV DNA SPEC NAA+PROBE-LOG#: SIGNIFICANT CHANGE UP LOG10IU/ML
CO2 SERPL-SCNC: 23 MMOL/L — SIGNIFICANT CHANGE UP (ref 22–31)
CREAT SERPL-MCNC: 0.8 MG/DL — SIGNIFICANT CHANGE UP (ref 0.5–1.3)
EGFR: 118 ML/MIN/1.73M2 — SIGNIFICANT CHANGE UP
EOSINOPHIL # BLD AUTO: 0 K/UL — SIGNIFICANT CHANGE UP (ref 0–0.5)
EOSINOPHIL NFR BLD AUTO: 0 % — SIGNIFICANT CHANGE UP (ref 0–6)
FIBRINOGEN PPP-MCNC: 148 MG/DL — LOW (ref 200–445)
GLUCOSE SERPL-MCNC: 93 MG/DL — SIGNIFICANT CHANGE UP (ref 70–99)
HCT VFR BLD CALC: 20.4 % — CRITICAL LOW (ref 39–50)
HGB BLD-MCNC: 7.1 G/DL — LOW (ref 13–17)
INR BLD: 1.21 RATIO — HIGH (ref 0.88–1.16)
LDH SERPL L TO P-CCNC: 145 U/L — SIGNIFICANT CHANGE UP (ref 50–242)
LYMPHOCYTES # BLD AUTO: 0.07 K/UL — LOW (ref 1–3.3)
LYMPHOCYTES # BLD AUTO: 1.8 % — LOW (ref 13–44)
MAGNESIUM SERPL-MCNC: 1.3 MG/DL — LOW (ref 1.6–2.6)
MANUAL SMEAR VERIFICATION: SIGNIFICANT CHANGE UP
MCHC RBC-ENTMCNC: 30.6 PG — SIGNIFICANT CHANGE UP (ref 27–34)
MCHC RBC-ENTMCNC: 34.8 GM/DL — SIGNIFICANT CHANGE UP (ref 32–36)
MCV RBC AUTO: 87.9 FL — SIGNIFICANT CHANGE UP (ref 80–100)
METAMYELOCYTES # FLD: 0.9 % — HIGH (ref 0–0)
MONOCYTES # BLD AUTO: 0.62 K/UL — SIGNIFICANT CHANGE UP (ref 0–0.9)
MONOCYTES NFR BLD AUTO: 16.1 % — HIGH (ref 2–14)
MYELOCYTES NFR BLD: 0.9 % — HIGH (ref 0–0)
NEUTROPHILS # BLD AUTO: 3.12 K/UL — SIGNIFICANT CHANGE UP (ref 1.8–7.4)
NEUTROPHILS NFR BLD AUTO: 80.3 % — HIGH (ref 43–77)
NRBC # BLD: 1 /100 — HIGH (ref 0–0)
PHOSPHATE SERPL-MCNC: 4 MG/DL — SIGNIFICANT CHANGE UP (ref 2.5–4.5)
PLAT MORPH BLD: ABNORMAL
PLATELET # BLD AUTO: 19 K/UL — CRITICAL LOW (ref 150–400)
POTASSIUM SERPL-MCNC: 3.9 MMOL/L — SIGNIFICANT CHANGE UP (ref 3.5–5.3)
POTASSIUM SERPL-SCNC: 3.9 MMOL/L — SIGNIFICANT CHANGE UP (ref 3.5–5.3)
PROT SERPL-MCNC: 6.4 G/DL — SIGNIFICANT CHANGE UP (ref 6–8.3)
PROTHROM AB SERPL-ACNC: 13.9 SEC — HIGH (ref 10.5–13.4)
RBC # BLD: 2.32 M/UL — LOW (ref 4.2–5.8)
RBC # FLD: 12.7 % — SIGNIFICANT CHANGE UP (ref 10.3–14.5)
RBC BLD AUTO: SIGNIFICANT CHANGE UP
SODIUM SERPL-SCNC: 143 MMOL/L — SIGNIFICANT CHANGE UP (ref 135–145)
TACROLIMUS SERPL-MCNC: 4.2 NG/ML — SIGNIFICANT CHANGE UP
WBC # BLD: 3.88 K/UL — SIGNIFICANT CHANGE UP (ref 3.8–10.5)
WBC # FLD AUTO: 3.88 K/UL — SIGNIFICANT CHANGE UP (ref 3.8–10.5)

## 2023-06-29 PROCEDURE — 99233 SBSQ HOSP IP/OBS HIGH 50: CPT

## 2023-06-29 RX ORDER — TACROLIMUS 5 MG/1
4.5 CAPSULE ORAL
Refills: 0 | Status: DISCONTINUED | OUTPATIENT
Start: 2023-06-29 | End: 2023-06-30

## 2023-06-29 RX ORDER — MAGNESIUM OXIDE 400 MG ORAL TABLET 241.3 MG
400 TABLET ORAL
Refills: 0 | Status: DISCONTINUED | OUTPATIENT
Start: 2023-06-29 | End: 2023-06-29

## 2023-06-29 RX ORDER — MAGNESIUM SULFATE 500 MG/ML
2 VIAL (ML) INJECTION ONCE
Refills: 0 | Status: COMPLETED | OUTPATIENT
Start: 2023-06-29 | End: 2023-06-29

## 2023-06-29 RX ADMIN — MYCOPHENOLATE MOFETIL 1000 MILLIGRAM(S): 250 CAPSULE ORAL at 06:03

## 2023-06-29 RX ADMIN — PIPERACILLIN AND TAZOBACTAM 25 GRAM(S): 4; .5 INJECTION, POWDER, LYOPHILIZED, FOR SOLUTION INTRAVENOUS at 12:22

## 2023-06-29 RX ADMIN — Medication 2 MILLIGRAM(S): at 09:13

## 2023-06-29 RX ADMIN — PIPERACILLIN AND TAZOBACTAM 25 GRAM(S): 4; .5 INJECTION, POWDER, LYOPHILIZED, FOR SOLUTION INTRAVENOUS at 06:01

## 2023-06-29 RX ADMIN — Medication 50 MILLIGRAM(S): at 12:23

## 2023-06-29 RX ADMIN — Medication 5 MILLILITER(S): at 00:12

## 2023-06-29 RX ADMIN — CHLORHEXIDINE GLUCONATE 1 APPLICATION(S): 213 SOLUTION TOPICAL at 06:06

## 2023-06-29 RX ADMIN — HEPARIN SODIUM 4.24 UNIT(S)/HR: 5000 INJECTION INTRAVENOUS; SUBCUTANEOUS at 17:38

## 2023-06-29 RX ADMIN — Medication 480 MICROGRAM(S): at 12:23

## 2023-06-29 RX ADMIN — Medication 1 MILLIGRAM(S): at 11:05

## 2023-06-29 RX ADMIN — MYCOPHENOLATE MOFETIL 1000 MILLIGRAM(S): 250 CAPSULE ORAL at 13:38

## 2023-06-29 RX ADMIN — URSODIOL 300 MILLIGRAM(S): 250 TABLET, FILM COATED ORAL at 17:39

## 2023-06-29 RX ADMIN — URSODIOL 300 MILLIGRAM(S): 250 TABLET, FILM COATED ORAL at 06:01

## 2023-06-29 RX ADMIN — Medication 10 MILLILITER(S): at 21:47

## 2023-06-29 RX ADMIN — Medication 1 LOZENGE: at 00:11

## 2023-06-29 RX ADMIN — MAGNESIUM OXIDE 400 MG ORAL TABLET 400 MILLIGRAM(S): 241.3 TABLET ORAL at 08:48

## 2023-06-29 RX ADMIN — Medication 10 MILLILITER(S): at 08:48

## 2023-06-29 RX ADMIN — Medication 5 MILLILITER(S): at 08:48

## 2023-06-29 RX ADMIN — TACROLIMUS 4 MILLIGRAM(S): 5 CAPSULE ORAL at 08:51

## 2023-06-29 RX ADMIN — Medication 5 MILLILITER(S): at 11:04

## 2023-06-29 RX ADMIN — Medication 1 LOZENGE: at 21:47

## 2023-06-29 RX ADMIN — Medication 1 SPRAY(S): at 06:04

## 2023-06-29 RX ADMIN — Medication 400 MILLIGRAM(S): at 06:04

## 2023-06-29 RX ADMIN — Medication 1 LOZENGE: at 08:49

## 2023-06-29 RX ADMIN — MAGNESIUM OXIDE 400 MG ORAL TABLET 400 MILLIGRAM(S): 241.3 TABLET ORAL at 17:39

## 2023-06-29 RX ADMIN — Medication 400 MILLIGRAM(S): at 17:40

## 2023-06-29 RX ADMIN — LORATADINE 10 MILLIGRAM(S): 10 TABLET ORAL at 11:05

## 2023-06-29 RX ADMIN — MAGNESIUM OXIDE 400 MG ORAL TABLET 400 MILLIGRAM(S): 241.3 TABLET ORAL at 11:10

## 2023-06-29 RX ADMIN — Medication 1 TABLET(S): at 11:05

## 2023-06-29 RX ADMIN — FAMOTIDINE 20 MILLIGRAM(S): 10 INJECTION INTRAVENOUS at 17:40

## 2023-06-29 RX ADMIN — Medication 1 LOZENGE: at 11:04

## 2023-06-29 RX ADMIN — FAMOTIDINE 20 MILLIGRAM(S): 10 INJECTION INTRAVENOUS at 06:21

## 2023-06-29 RX ADMIN — Medication 5 MILLILITER(S): at 23:06

## 2023-06-29 RX ADMIN — Medication 5 MILLILITER(S): at 17:41

## 2023-06-29 RX ADMIN — PIPERACILLIN AND TAZOBACTAM 25 GRAM(S): 4; .5 INJECTION, POWDER, LYOPHILIZED, FOR SOLUTION INTRAVENOUS at 21:48

## 2023-06-29 RX ADMIN — FLUCONAZOLE 400 MILLIGRAM(S): 150 TABLET ORAL at 11:05

## 2023-06-29 RX ADMIN — MYCOPHENOLATE MOFETIL 1000 MILLIGRAM(S): 250 CAPSULE ORAL at 23:04

## 2023-06-29 RX ADMIN — LETERMOVIR 480 MILLIGRAM(S): 480 TABLET, FILM COATED ORAL at 11:06

## 2023-06-29 RX ADMIN — Medication 10 MILLILITER(S): at 00:17

## 2023-06-29 RX ADMIN — Medication 50 MILLIGRAM(S): at 12:22

## 2023-06-29 RX ADMIN — Medication 10 MILLILITER(S): at 23:06

## 2023-06-29 RX ADMIN — Medication 10 MILLILITER(S): at 11:04

## 2023-06-29 RX ADMIN — Medication 25 GRAM(S): at 08:38

## 2023-06-29 RX ADMIN — TACROLIMUS 4.5 MILLIGRAM(S): 5 CAPSULE ORAL at 22:08

## 2023-06-29 NOTE — PROVIDER CONTACT NOTE (CRITICAL VALUE NOTIFICATION) - PERSON GIVING RESULT:
Jaswant
Deborah Schneider
perez jackman
Jaswant Espinoza
perez
Deborah
Chica Weinstein/John
Cristiane Henderson
Deborah Schneider
perez jackman
blair benitez
perez
Chica Weinstein/John
Shirley Hoff (reynaldo)
juaquin
madelin

## 2023-06-29 NOTE — PROVIDER CONTACT NOTE (CRITICAL VALUE NOTIFICATION) - RECOMMENDATIONS
continue to monitor
Notify PA - monitor for s/s of bleeding.
Continue to monitor
transfuse 1 unit of PRBC w/pre meds
1 u plt
Notify NP. One unit platelet transfusion. Monitor for s/s of hypotension, SOB, dizziness.
1 u prbc
1 unit of PRBC transfused over 3 hours
Notify NP. Monitor for s/s of hypotension, SOB, dizziness.
monitor patient
none
transfuse 1 unit of plts w/pre meds

## 2023-06-29 NOTE — PROVIDER CONTACT NOTE (CRITICAL VALUE NOTIFICATION) - SITUATION
Platelet count = 19
plt 8
plt=7
Hgb: 7.0, Platelets: 20
hct 21.0, platelet count 8
hgb 7.2, hct 21.0
Platelets: 19, Hct: 20.4
h/h/plt=6.7/19.1/12
HPC culture
low cbc
Hgb 6.6, Hct 19, Plt 7
low cbc
cbc
plt 14
plt=8
wbc 0.09  hgb 7.6  hct 21  plts 13

## 2023-06-29 NOTE — PROVIDER CONTACT NOTE (CRITICAL VALUE NOTIFICATION) - ASSESSMENT
none
stable
Asymptomatic
Stable and afebrile.
NAD
asymptomatic
A&O X4, VS stable and afebrile
vss, no s/s of active bleeding
Stable and afebrile.
Stable and afebrile.
A&O x4, vs stable and afebrile, no bleeding
NAD
asymptomatic
vss, no s/s of active bleeding
asymptomatic
vss, no s/s of actie bleeding

## 2023-06-29 NOTE — PROVIDER CONTACT NOTE (CRITICAL VALUE NOTIFICATION) - BACKGROUND
13 days post haplo transplant
s/p stem cells
6 days post ttansp[lant
s/p bone marrow
s/p cytoxan
Aplastic Anemia
day +15 for haplo transplant
Aplastic anemia Day + 19 a/p haplo stem cell transplant
S/P HPC transplant
post chemo
s/p stem cells
Aplastic anemia / Day +14 haplo stem cell transplant
11 days post haplo transplant
Day +18 s/p haplo stem cell transplant
Aplastic Anemia, s/p bone marrow transplant, Day +20
day +13cfor haplo transplant

## 2023-06-29 NOTE — PROVIDER CONTACT NOTE (CRITICAL VALUE NOTIFICATION) - TEST AND RESULT REPORTED:
Hgb: 7.0, Platelets: 20
plt=8
platelet count = 19
Hgb 6.6, Hct 19, plt 7
hgb 7.2, hct 21.0
hct 21.0, platelet 8
plt 8
plt=7
plts 10 H/H 6.5/18.7
CBC
wbc 0.22  plts 8 H/H 7.0/19.8
h/h/plt=6.7/19.1/12
Platelets: 19 Hct: 20.4
HPC culture
cbc
Plt 14

## 2023-06-29 NOTE — PHARMACOTHERAPY INTERVENTION NOTE - COMMENTS
Allergies    No Known Allergies    Intolerances      MEDICATIONS  (STANDING):  acetaminophen     Tablet .. 650 milliGRAM(s) Oral every 6 hours  acyclovir   Oral Tab/Cap 400 milliGRAM(s) Oral every 12 hours  Biotene Dry Mouth Oral Rinse 5 milliLiter(s) Swish and Spit five times a day  Chemotherapy Worklist Order      chlorhexidine 4% Liquid 1 Application(s) Topical <User Schedule>  clotrimazole Lozenge 1 Lozenge Oral five times a day  famotidine    Tablet 20 milliGRAM(s) Oral two times a day  filgrastim-sndz (ZARXIO) Injectable (Chemo) 480 MICROGram(s) SubCutaneous every 24 hours  fluconAZOLE   Tablet 400 milliGRAM(s) Oral daily  fluticasone propionate 50 MICROgram(s)/spray Nasal Spray 1 Spray(s) Both Nostrils two times a day  folic acid 1 milliGRAM(s) Oral daily  heparin  Infusion 424 Unit(s)/Hr (4.24 mL/Hr) IV Continuous <Continuous>  hydrocortisone sodium succinate Injectable 50 milliGRAM(s) IV Push every 24 hours  Infuse HPC Product      letermovir 480 milliGRAM(s) Oral daily  lidocaine/prilocaine Cream (Chemo) 1 Application(s) Topical daily  loratadine 10 milliGRAM(s) Oral daily  magnesium oxide 400 milliGRAM(s) Oral three times a day with meals  multivitamin 1 Tablet(s) Oral daily  mycophenolate mofetil 1000 milliGRAM(s) Oral three times a day  piperacillin/tazobactam IVPB.. 4.5 Gram(s) IV Intermittent every 8 hours  sodium bicarbonate  Infusion (Chemo) 0.074 mEq/kG/Hr (150 mL/Hr) IV Continuous <Continuous>  sodium bicarbonate Mouth Rinse 10 milliLiter(s) Swish and Spit five times a day  sodium chloride 0.65% Nasal 1 Spray(s) Both Nostrils two times a day  sodium chloride 0.9% 1000 milliLiter(s) (288 mL/Hr) IV Continuous <Continuous>  sodium chloride 0.9%. 1000 milliLiter(s) (20 mL/Hr) IV Continuous <Continuous>  sodium chloride 0.9%. (Chemo) 1000 milliLiter(s) (200 mL/Hr) IV Continuous <Continuous>  tacrolimus 4.5 milliGRAM(s) Oral <User Schedule>  ursodiol Capsule 300 milliGRAM(s) Oral two times a day    MEDICATIONS  (PRN):  acetaminophen     Tablet .. 650 milliGRAM(s) Oral every 6 hours PRN Temp greater or equal to 38C (100.4F), Mild Pain (1 - 3)  acetaminophen 325 mG/butalbital 50 mG/caffeine 40 mG 1 Tablet(s) Oral every 6 hours PRN headache  dibucaine 1% Ointment 1 Application(s) Topical two times a day PRN hemmorrhods  diphenhydrAMINE 50 milliGRAM(s) Oral every 6 hours PRN PRE TRANSFUSION  guaiFENesin  milliGRAM(s) Oral every 12 hours PRN sinus congestion  hemorrhoidal Ointment 1 Application(s) Rectal three times a day PRN hemorrhoids  hydrocortisone sodium succinate Injectable 50 milliGRAM(s) IV Push every 6 hours PRN PRE TRANSFUSION  loperamide 2 milliGRAM(s) Oral every 4 hours PRN Diarrhea  prochlorperazine   Injectable 10 milliGRAM(s) IV Push every 6 hours PRN nausea  pseudoephedrine 30 milliGRAM(s) Oral every 6 hours PRN sinus pain  senna 1 Tablet(s) Oral at bedtime PRN Constipation  sodium chloride 0.9% lock flush 10 milliLiter(s) IV Push every 1 hour PRN Pre/post blood products, medications, blood draw, and to maintain line patency  witch hazel Pads 1 Application(s) Topical four times a day PRN hemnorrhoids    Recipient of Education:  [X]Patient  [  ]Family, please specify ___________  [  ]Other, please specify ____________    Readiness to Learn:  [X]Ready  [  ]Not ready: cognitive  [  ]Not ready: physical  [  ]Not ready: emotional  Comment(s):    Barriers to Information Exhange:  [X]None identified  [  ]Vision  [  ]Hearing  [  ]Language  [  ]Literacy  [  ]Memory and Learning  [  ]Culture/Holiness  [  ]Other, please specify ____________  Comment(s):    Patient Education Topics:  [  ]Anticoagulation  [  ]Heart Failure (HF)  [  ]Chronic Obstructive Pulmonary Disease (COPD)  [  ]Diabetes Mellitus (DM)  [  ]Stroke  [X]Other, please specify: Post Allogeneic HSCT Discharge Counseling  Comment(s):    Medication Counseling Points:  [X]Medications Reviewed  [X]Medication Schedule  [X]Precautions  [X]Side Effects  [X]Indication for Use  [X]Drug/Drug Interactions  [X]Drug/Food Interactions  [  ]Other, please specify ____________  Comment(s):    Teaching Method:  [X]Verbal Instruction  [X]Written Material, please specify: Personalized medication chart  [  ]Skill Demonstration  [X]Teach Back (Patient repeats in own words)  [  ]Ask Me 3  [  ]Computer/Internet  [  ]Other, please specify ____________  Comment(s):    Educational Evaluation:  [X]Meets goals/outcomes  [  ]Partially meets - needs review/practice  [  ]Unable to meet - needs instruction  [  ]Reinforced previously met goal  [  ]Patient declines instruction  [  ]Not applicable  Comment(s):    37-year-old male with severe aplastic anemia previously treated with eATG +CsA + eltrombopag in April 2020. He relapsed November 2021 and was treated with rATG + CSA + prednisone + eltombopag and then ravulizumab. He is here for a haploidentical allogeneic stem cell transplant, today is day +21. Course has been complicated by nausea, haplostorm, and pansinusitus (on zosyn day 9).     Counseled patient today for discharge on new transplant medications including immunosuppression, anti-infectives, vitamins, PPI, and PRN nausea meds. Reviewed doses, indications, efficacy and safety monitoring parameters, storage, and medication timeline.  Also counseled patient on the importance of adherence. Advised patient to: wear sunblock SPF>15 secondary to immunosuppressive regimen increasing risk of skin cancer; avoid grapefruit juice and pomegranate juice as they may increase tacrolimus levels; avoid over-the-counter NSAIDs, herbal, or dietary supplements as they may be harmful to the kidney; contact the clinic before initiating any new OTC or RX medications; hold immunosuppression dose before any clinic visits and bring medication to take in clinic after blood draws. Also advised patient to taper mycophenolate to every 12 hours starting on July 9th.       Patient expressed understanding and teach back method was used to confirm. All questions were answered to the patient's satisfaction and medication sheet was provided with medication name, strength, schedule, indication, and special instructions.      Time spent: 45 minutes    Carroll Abdelmeseh, PharmD, BCOP  Stem Cell Transplant Clinical Pharmacy Specialist

## 2023-06-29 NOTE — PROVIDER CONTACT NOTE (CRITICAL VALUE NOTIFICATION) - NAME OF MD/NP/PA/DO NOTIFIED:
Justice BEARD
susie lyon np
anastacio eduardo
Dominic FORTE
NP [place]
susie boss
Axel BEARD
Rosemarie BEARD
KISHA Herbert
NP [place]
NP [place]
NP Ronda De Guzman
susie boss np
Mila De Guzman NP
NP Ronda De Guzman
swati cervantes

## 2023-06-29 NOTE — PROVIDER CONTACT NOTE (CRITICAL VALUE NOTIFICATION) - ACTION/TREATMENT ORDERED:
none
tx 1 bag SD platelets
NP aware and notified. One unit platelets ordered. Will monitor for s/s of hypotension, SOB, dizziness.
transfuse 1 unit of PRBC w/pre meds
nine
no new orders at this time
Continue to monitor
1 unit of PRBC transfused over 3 hours
NP aware and notified. No transfusion at this time. Will monitor for s/s of hypotension, SOB, dizziness.
PA aware and notified. No transfusion at this time. Monitoring for bleeding is ongoing.
n/a
none
1 u prbc
transfuse 1 unit of plts w/pre meds
tx 1 bag SD plts, tx 1 unit PRBC
1 u plt

## 2023-06-29 NOTE — PROGRESS NOTE ADULT - SUBJECTIVE AND OBJECTIVE BOX
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 () with Flu / Cy / TBI prep regimen for treatment of severe aplastic anemia    S: Patient seen and examined with HPC Transplant Team:       O:      Vital Signs Last 24 Hrs  T(C): 36.8 (2023 05:18), Max: 36.9 (2023 09:25)  T(F): 98.3 (2023 05:18), Max: 98.5 (2023 12:58)  HR: 83 (2023 05:18) (76 - 92)  BP: 108/65 (2023 05:18) (100/60 - 119/73)  BP(mean): --  RR: 18 (2023 05:18) (17 - 18)  SpO2: 95% (2023 05:18) (95% - 100%)    Parameters below as of 2023 05:18  Patient On (Oxygen Delivery Method): room air    Admit weight: 102.8kg  Daily Weight in k.1 (2023 08:50)    Intake / Output:    @ 07:01  -   @ 07:00  --------------------------------------------------------  IN: 3123.2 mL / OUT: 301 mL / NET: 2822.2 mL    PE:   Oropharynx: no erythema or ulcers  ENT: tenderness + in maxillary, ethmoidal sinus area  Oral Mucositis: (-)                                                        Grade: -  CVS: RRR, +S1,S2  Lungs: CTA B/L  Abdomen: Soft, NT, ND, +BS  Extremities: No edema in BLE  Gastric Mucositis: (-)                                                 Grade: -  Intestinal Mucositis:   (-)                                           Grade: -  Skin: localized patchy erythematous rash to left medial foot    TLC: CDI  Neuro: Alert, O x 3  Pain: Denies      Labs:   CBC Full  -  ( 2023 07:20 )  WBC Count : 3.88 K/uL  Hemoglobin : 7.1 g/dL  Hematocrit : 20.4 %  Platelet Count - Automated : 19 K/uL  Mean Cell Volume : 87.9 fl  Mean Cell Hemoglobin : 30.6 pg  Mean Cell Hemoglobin Concentration : 34.8 gm/dL  Auto Neutrophil # : 3.12 K/uL  Auto Lymphocyte # : 0.07 K/uL  Auto Monocyte # : 0.62 K/uL  Auto Eosinophil # : 0.00 K/uL  Auto Basophil # : 0.00 K/uL  Auto Neutrophil % : 80.3 %  Auto Lymphocyte % : 1.8 %  Auto Monocyte % : 16.1 %  Auto Eosinophil % : 0.0 %  Auto Basophil % : 0.0 %                          7.1    3.88  )-----------( 19       ( 2023 07:20 )             20.4         144  |  107  |  10  ----------------------------<  86  3.9   |  25  |  0.76    Ca    9.0      2023 06:54  Phos  4.0       Mg     1.3         TPro  6.1  /  Alb  4.2  /  TBili  0.4  /  DBili  <0.1  /  AST  25  /  ALT  63<H>  /  AlkPhos  92      PT/INR - ( 2023 07:20 )   PT: 13.9 sec;   INR: 1.21 ratio         PTT - ( 2023 07:20 )  PTT:30.1 sec  LIVER FUNCTIONS - ( 2023 06:54 )  Alb: 4.2 g/dL / Pro: 6.1 g/dL / ALK PHOS: 92 U/L / ALT: 63 U/L / AST: 25 U/L / GGT: x           Lactate Dehydrogenase, Serum: 145 U/L ( @ 07:20)            Cultures:         Radiology:       Meds:   Antimicrobials:   acyclovir   Oral Tab/Cap 400 milliGRAM(s) Oral every 12 hours  clotrimazole Lozenge 1 Lozenge Oral five times a day  fluconAZOLE   Tablet 400 milliGRAM(s) Oral daily  letermovir 480 milliGRAM(s) Oral daily  piperacillin/tazobactam IVPB.. 4.5 Gram(s) IV Intermittent every 8 hours      Heme / Onc:   heparin  Infusion 424 Unit(s)/Hr IV Continuous <Continuous>      GI:  famotidine    Tablet 20 milliGRAM(s) Oral two times a day  loperamide 2 milliGRAM(s) Oral every 4 hours PRN  senna 1 Tablet(s) Oral at bedtime PRN  sodium bicarbonate Mouth Rinse 10 milliLiter(s) Swish and Spit five times a day  ursodiol Capsule 300 milliGRAM(s) Oral two times a day      Cardiovascular:       Immunologic:   filgrastim-sndz (ZARXIO) Injectable (Chemo) 480 MICROGram(s) SubCutaneous every 24 hours  mycophenolate mofetil 1000 milliGRAM(s) Oral three times a day  tacrolimus 4 milliGRAM(s) Oral <User Schedule>      Other medications:   acetaminophen     Tablet .. 650 milliGRAM(s) Oral every 6 hours  Biotene Dry Mouth Oral Rinse 5 milliLiter(s) Swish and Spit five times a day  Chemotherapy Worklist Order      chlorhexidine 4% Liquid 1 Application(s) Topical <User Schedule>  fluticasone propionate 50 MICROgram(s)/spray Nasal Spray 1 Spray(s) Both Nostrils two times a day  folic acid 1 milliGRAM(s) Oral daily  hydrocortisone sodium succinate Injectable 50 milliGRAM(s) IV Push every 24 hours  Infuse HPC Product      lidocaine/prilocaine Cream (Chemo) 1 Application(s) Topical daily  loratadine 10 milliGRAM(s) Oral daily  magnesium oxide 400 milliGRAM(s) Oral three times a day with meals  multivitamin 1 Tablet(s) Oral daily  sodium bicarbonate  Infusion (Chemo) 0.074 mEq/kG/Hr IV Continuous <Continuous>  sodium chloride 0.65% Nasal 1 Spray(s) Both Nostrils two times a day  sodium chloride 0.9% 1000 milliLiter(s) IV Continuous <Continuous>  sodium chloride 0.9%. 1000 milliLiter(s) IV Continuous <Continuous>  sodium chloride 0.9%. (Chemo) 1000 milliLiter(s) IV Continuous <Continuous>      PRN:   acetaminophen     Tablet .. 650 milliGRAM(s) Oral every 6 hours PRN  acetaminophen 325 mG/butalbital 50 mG/caffeine 40 mG 1 Tablet(s) Oral every 6 hours PRN  dibucaine 1% Ointment 1 Application(s) Topical two times a day PRN  diphenhydrAMINE 50 milliGRAM(s) Oral every 6 hours PRN  guaiFENesin  milliGRAM(s) Oral every 12 hours PRN  hemorrhoidal Ointment 1 Application(s) Rectal three times a day PRN  hydrocortisone sodium succinate Injectable 50 milliGRAM(s) IV Push every 6 hours PRN  loperamide 2 milliGRAM(s) Oral every 4 hours PRN  prochlorperazine   Injectable 10 milliGRAM(s) IV Push every 6 hours PRN  pseudoephedrine 30 milliGRAM(s) Oral every 6 hours PRN  senna 1 Tablet(s) Oral at bedtime PRN  sodium chloride 0.9% lock flush 10 milliLiter(s) IV Push every 1 hour PRN  witch hazel Pads 1 Application(s) Topical four times a day PRN        A/P: 37 year old male with severe aplastic anemia with multiple lines of treatment admitted for a haplo-identical bone marrow transplant with  Flu / Cy / TBI preparative regimen   Day + 21  Strict I&O, daily weights, prn diuresis   Day -6 - day -2 - Fludarabine 30mg/m2 IV  Day -6 - day - 5 - CTX 14.5mg/kg/day IV. CTX to start after urine SG < 1.010. Mesna  12mg / kg - hemorrhagic cystitis ppx   Day -2 - IVIG 0.4 g/kg (ideal body weight) every 3 weeks. Premedication with Tylenol and benadryl   Day -1 -  cGy x 1 dose   Day - 1 - at 2200 begin transplant hydration : 0.45Ns + 1 amp (50mEq) sodium bicarbonate at 150ml /hr; continue transplant hydration for 24 hours post infusion    Day + 2 at 2200 - begin CTX hydration (0.45NS + 10 mEq KCl/ @150ml /m2  continue 24 hours post last dose of CTX   Day + 3 - + 4 - CTX 50mg/kg/day IV. Begin CTX when urine SG < 1.010. EKG daily. Mesna for hemorraghic cystitis ppx.   Day + 5 - start Zarxio,  MMF and Tacrolimus. Check Tacrolimus levels on Monday and Thursday.   when ANC < 500, start Cipro 500mg po BID. If becomes febrile, pan cx, CXR and change Cipro to Cefepime 2g IV q 8 hours. Continue until count recovery  /5- Fludarabine 4/5, intermittent nausea, continue antiemetics   /6- Fludarabine 5/5, IVIG  6/7 TBI   - HPC transplant today. Continue transplant hydration for 24 hours post infusion of bone marrow   - monitor for CRS / haplo storm - if T >/= 102.5 on days +1 or +2, would dose tocilizumab 8mg / kg IVPB x 1    C.Diff negative, started imodium  - post transplant CTX 2.2 - continue CTX hydration for 24 hours post infusion of last dose , Tocilizumab x1 dose O/N for temp of 39.1.    eye pain overnight, no visual acuity deficits. Opthalmology evaluating - appreciated - optic disc is sharp/pink, overall suggesting against a diagnosis of optic neuritis. Follow up CT maxillofacial/orbits -   - CT maxillofacial / orbits consistent with pansinusitis, continue zosyn, nasal sprays     1. Infectious Disease:   acyclovir   Oral Tab/Cap 400 milliGRAM(s) Oral every 12 hours  clotrimazole Lozenge 1 Lozenge Oral five times a day  fluconAZOLE   Tablet 400 milliGRAM(s) Oral daily  letermovir 480 milliGRAM(s) Oral daily  piperacillin/tazobactam IVPB.. 4.5 Gram(s) IV Intermittent every 8 hours    2. VOD Prophylaxis: Actigall, Glutamine, Heparin (dosed at 100 units / kg / day)     3. GI Prophylaxis:    famotidine    Tablet 20 milliGRAM(s) Oral two times a day    4. Mouthcare - NS / NaHCO3 rinses, Mycelex, Biotene; Skin care     5. GVHD prophylaxis   TBI day -1   CTX days +3, +4   mycophenolate mofetil 1000 milliGRAM(s) Oral three times a day  tacrolimus 4 milliGRAM(s) Oral <User Schedule>    6. Transfuse & replete electrolytes prn   Replete Mg magnesium sulfate  IVPB 2 Gram(s) IV Intermittent every 2 hours    7. IV hydration, daily weights, strict I&O, prn diuresis     8. PO intake as tolerated, nutrition follow up as needed, MVI, folic acid     9. Antiemetics, anti-diarrhea medications:   loperamide 2 milliGRAM(s) Oral every 4 hours PRN  prochlorperazine   Injectable 10 milliGRAM(s) IV Push every 6 hours PRN    10. OOB as tolerated, physical therapy consult if needed     11. Monitor coags / fibrinogen 2x week, vitamin K as needed     12. Monitor closely for clinical changes, monitor for fevers     13. Emotional support provided, plan of care discussed and questions addressed     14. Patient education done regarding plan of care, restrictions and discharge planning     15. Continue regular social work input     I have written the above note for Dr. Lima who performed service with me in the room.   Axel Goncalves PA-C ([phone])    I have seen and examined patient with PA, I agree with above note as scribed.                  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 () with Flu / Cy / TBI prep regimen for treatment of severe aplastic anemia    S: Patient seen and examined with HPC Transplant Team:   No complaints    O:  rest of ROS negative    Vital Signs Last 24 Hrs  T(C): 36.8 (2023 05:18), Max: 36.9 (2023 09:25)  T(F): 98.3 (2023 05:18), Max: 98.5 (2023 12:58)  HR: 83 (2023 05:18) (76 - 92)  BP: 108/65 (2023 05:18) (100/60 - 119/73)  BP(mean): --  RR: 18 (2023 05:18) (17 - 18)  SpO2: 95% (2023 05:18) (95% - 100%)    Parameters below as of 2023 05:18  Patient On (Oxygen Delivery Method): room air    Admit weight: 102.8kg  Daily Weight in k.1 (2023 08:50)    Intake / Output:    @ 07:01  -   @ 07:00  --------------------------------------------------------  IN: 3123.2 mL / OUT: 301 mL / NET: 2822.2 mL    PE:   Oropharynx: no erythema or ulcers  Oral Mucositis: (-)                                                        Grade: -  CVS: RRR, +S1,S2  Lungs: CTA B/L  Abdomen: Soft, NT, ND, +BS  Extremities: No edema in BLE  Gastric Mucositis: (-)                                                 Grade: -  Intestinal Mucositis:   (-)                                           Grade: -  Skin: no rashes  TLC: CDI  Neuro: Alert, O x 3  Pain: Denies      Labs:   CBC Full  -  ( 2023 07:20 )  WBC Count : 3.88 K/uL  Hemoglobin : 7.1 g/dL  Hematocrit : 20.4 %  Platelet Count - Automated : 19 K/uL  Mean Cell Volume : 87.9 fl  Mean Cell Hemoglobin : 30.6 pg  Mean Cell Hemoglobin Concentration : 34.8 gm/dL  Auto Neutrophil # : 3.12 K/uL  Auto Lymphocyte # : 0.07 K/uL  Auto Monocyte # : 0.62 K/uL  Auto Eosinophil # : 0.00 K/uL  Auto Basophil # : 0.00 K/uL  Auto Neutrophil % : 80.3 %  Auto Lymphocyte % : 1.8 %  Auto Monocyte % : 16.1 %  Auto Eosinophil % : 0.0 %  Auto Basophil % : 0.0 %                          7.1    3.88  )-----------( 19       ( 2023 07:20 )             20.4         144  |  107  |  10  ----------------------------<  86  3.9   |  25  |  0.76    Ca    9.0      2023 06:54  Phos  4.0       Mg     1.3         TPro  6.1  /  Alb  4.2  /  TBili  0.4  /  DBili  <0.1  /  AST  25  /  ALT  63<H>  /  AlkPhos  92      PT/INR - ( 2023 07:20 )   PT: 13.9 sec;   INR: 1.21 ratio         PTT - ( 2023 07:20 )  PTT:30.1 sec  LIVER FUNCTIONS - ( 2023 06:54 )  Alb: 4.2 g/dL / Pro: 6.1 g/dL / ALK PHOS: 92 U/L / ALT: 63 U/L / AST: 25 U/L / GGT: x           Lactate Dehydrogenase, Serum: 145 U/L ( @ 07:20)      Cultures:         Radiology:       Meds:   Antimicrobials:   acyclovir   Oral Tab/Cap 400 milliGRAM(s) Oral every 12 hours  clotrimazole Lozenge 1 Lozenge Oral five times a day  fluconAZOLE   Tablet 400 milliGRAM(s) Oral daily  letermovir 480 milliGRAM(s) Oral daily  piperacillin/tazobactam IVPB.. 4.5 Gram(s) IV Intermittent every 8 hours      Heme / Onc:   heparin  Infusion 424 Unit(s)/Hr IV Continuous <Continuous>      GI:  famotidine    Tablet 20 milliGRAM(s) Oral two times a day  loperamide 2 milliGRAM(s) Oral every 4 hours PRN  senna 1 Tablet(s) Oral at bedtime PRN  sodium bicarbonate Mouth Rinse 10 milliLiter(s) Swish and Spit five times a day  ursodiol Capsule 300 milliGRAM(s) Oral two times a day      Cardiovascular:       Immunologic:   filgrastim-sndz (ZARXIO) Injectable (Chemo) 480 MICROGram(s) SubCutaneous every 24 hours  mycophenolate mofetil 1000 milliGRAM(s) Oral three times a day  tacrolimus 4 milliGRAM(s) Oral <User Schedule>      Other medications:   acetaminophen     Tablet .. 650 milliGRAM(s) Oral every 6 hours  Biotene Dry Mouth Oral Rinse 5 milliLiter(s) Swish and Spit five times a day  Chemotherapy Worklist Order      chlorhexidine 4% Liquid 1 Application(s) Topical <User Schedule>  fluticasone propionate 50 MICROgram(s)/spray Nasal Spray 1 Spray(s) Both Nostrils two times a day  folic acid 1 milliGRAM(s) Oral daily  hydrocortisone sodium succinate Injectable 50 milliGRAM(s) IV Push every 24 hours  Infuse HPC Product      lidocaine/prilocaine Cream (Chemo) 1 Application(s) Topical daily  loratadine 10 milliGRAM(s) Oral daily  magnesium oxide 400 milliGRAM(s) Oral three times a day with meals  multivitamin 1 Tablet(s) Oral daily  sodium bicarbonate  Infusion (Chemo) 0.074 mEq/kG/Hr IV Continuous <Continuous>  sodium chloride 0.65% Nasal 1 Spray(s) Both Nostrils two times a day  sodium chloride 0.9% 1000 milliLiter(s) IV Continuous <Continuous>  sodium chloride 0.9%. 1000 milliLiter(s) IV Continuous <Continuous>  sodium chloride 0.9%. (Chemo) 1000 milliLiter(s) IV Continuous <Continuous>      PRN:   acetaminophen     Tablet .. 650 milliGRAM(s) Oral every 6 hours PRN  acetaminophen 325 mG/butalbital 50 mG/caffeine 40 mG 1 Tablet(s) Oral every 6 hours PRN  dibucaine 1% Ointment 1 Application(s) Topical two times a day PRN  diphenhydrAMINE 50 milliGRAM(s) Oral every 6 hours PRN  guaiFENesin  milliGRAM(s) Oral every 12 hours PRN  hemorrhoidal Ointment 1 Application(s) Rectal three times a day PRN  hydrocortisone sodium succinate Injectable 50 milliGRAM(s) IV Push every 6 hours PRN  loperamide 2 milliGRAM(s) Oral every 4 hours PRN  prochlorperazine   Injectable 10 milliGRAM(s) IV Push every 6 hours PRN  pseudoephedrine 30 milliGRAM(s) Oral every 6 hours PRN  senna 1 Tablet(s) Oral at bedtime PRN  sodium chloride 0.9% lock flush 10 milliLiter(s) IV Push every 1 hour PRN  witch hazel Pads 1 Application(s) Topical four times a day PRN        A/P: 37 year old male with severe aplastic anemia with multiple lines of treatment admitted for a haplo-identical bone marrow transplant with  Flu / Cy / TBI preparative regimen   Day + 21  Strict I&O, daily weights, prn diuresis   Day -6 - day -2 - Fludarabine 30mg/m2 IV  Day -6 - day - 5 - CTX 14.5mg/kg/day IV. CTX to start after urine SG < 1.010. Mesna  12mg / kg - hemorrhagic cystitis ppx   Day -2 - IVIG 0.4 g/kg (ideal body weight) every 3 weeks. Premedication with Tylenol and benadryl   Day -1 -  cGy x 1 dose   Day - 1 - at 2200 begin transplant hydration : 0.45Ns + 1 amp (50mEq) sodium bicarbonate at 150ml /hr; continue transplant hydration for 24 hours post infusion    Day + 2 at 2200 - begin CTX hydration (0.45NS + 10 mEq KCl/ @150ml /m2  continue 24 hours post last dose of CTX   Day + 3 - + 4 - CTX 50mg/kg/day IV. Begin CTX when urine SG < 1.010. EKG daily. Mesna for hemorraghic cystitis ppx.   Day + 5 - start Zarxio,  MMF and Tacrolimus. Check Tacrolimus levels on Monday and Thursday.   when ANC < 500, start Cipro 500mg po BID. If becomes febrile, pan cx, CXR and change Cipro to Cefepime 2g IV q 8 hours. Continue until count recovery  /- Fludarabine 4/, intermittent nausea, continue antiemetics   - Fludarabine , IVIG  6/7 TBI   - HPC transplant today. Continue transplant hydration for 24 hours post infusion of bone marrow   - monitor for CRS / haplo storm - if T >/= 102.5 on days +1 or +2, would dose tocilizumab 8mg / kg IVPB x 1    C.Diff negative, started imodium  - post transplant CTX 2.2 - continue CTX hydration for 24 hours post infusion of last dose , Tocilizumab x1 dose O/N for temp of 39.1.    eye pain overnight, no visual acuity deficits. Opthalmology evaluating - appreciated - optic disc is sharp/pink, overall suggesting against a diagnosis of optic neuritis. Follow up CT maxillofacial/orbits -   - CT maxillofacial / orbits consistent with pansinusitis, continue zosyn, nasal sprays    Increased Tacrolimus 4.5mg bid, D/C planning    1. Infectious Disease:   acyclovir   Oral Tab/Cap 400 milliGRAM(s) Oral every 12 hours  clotrimazole Lozenge 1 Lozenge Oral five times a day  fluconAZOLE   Tablet 400 milliGRAM(s) Oral daily  letermovir 480 milliGRAM(s) Oral daily  piperacillin/tazobactam IVPB.. 4.5 Gram(s) IV Intermittent every 8 hours    2. VOD Prophylaxis: Actigall, Glutamine, Heparin (dosed at 100 units / kg / day)     3. GI Prophylaxis:    famotidine    Tablet 20 milliGRAM(s) Oral two times a day    4. Mouthcare - NS / NaHCO3 rinses, Mycelex, Biotene; Skin care     5. GVHD prophylaxis   TBI day -1   CTX days +3, +4   mycophenolate mofetil 1000 milliGRAM(s) Oral three times a day  tacrolimus 4.5 milliGRAM(s) Oral <User Schedule>    6. Transfuse & replete electrolytes prn   Replete Mg magnesium sulfate  IVPB 2 Gram(s) IV Intermittent every 2 hours    7. IV hydration, daily weights, strict I&O, prn diuresis     8. PO intake as tolerated, nutrition follow up as needed, MVI, folic acid     9. Antiemetics, anti-diarrhea medications:   loperamide 2 milliGRAM(s) Oral every 4 hours PRN  prochlorperazine   Injectable 10 milliGRAM(s) IV Push every 6 hours PRN    10. OOB as tolerated, physical therapy consult if needed     11. Monitor coags / fibrinogen 2x week, vitamin K as needed     12. Monitor closely for clinical changes, monitor for fevers     13. Emotional support provided, plan of care discussed and questions addressed     14. Patient education done regarding plan of care, restrictions and discharge planning     15. Continue regular social work input     I have written the above note for Dr. Lima who performed service with me in the room.   Axel Goncalves PA-C ([phone])    I have seen and examined patient with PA, I agree with above note as scribed.

## 2023-06-29 NOTE — PHARMACOTHERAPY INTERVENTION NOTE - INTERVENTION CATEGORIES
Patient Education
Therapy
Patient Education
Therapy

## 2023-06-29 NOTE — PHARMACOTHERAPY INTERVENTION NOTE - OUTCOME
accepted
pending
accepted
pending
accepted
pending
accepted

## 2023-06-30 ENCOUNTER — TRANSCRIPTION ENCOUNTER (OUTPATIENT)
Age: 38
End: 2023-06-30

## 2023-06-30 VITALS
RESPIRATION RATE: 18 BRPM | SYSTOLIC BLOOD PRESSURE: 136 MMHG | HEART RATE: 81 BPM | DIASTOLIC BLOOD PRESSURE: 89 MMHG | TEMPERATURE: 98 F | OXYGEN SATURATION: 99 %

## 2023-06-30 LAB
ALBUMIN SERPL ELPH-MCNC: 4.2 G/DL — SIGNIFICANT CHANGE UP (ref 3.3–5)
ALP SERPL-CCNC: 106 U/L — SIGNIFICANT CHANGE UP (ref 40–120)
ALT FLD-CCNC: 79 U/L — HIGH (ref 10–45)
ANION GAP SERPL CALC-SCNC: 9 MMOL/L — SIGNIFICANT CHANGE UP (ref 5–17)
AST SERPL-CCNC: 26 U/L — SIGNIFICANT CHANGE UP (ref 10–40)
BASOPHILS # BLD AUTO: 0 K/UL — SIGNIFICANT CHANGE UP (ref 0–0.2)
BASOPHILS NFR BLD AUTO: 0 % — SIGNIFICANT CHANGE UP (ref 0–2)
BILIRUB DIRECT SERPL-MCNC: <0.1 MG/DL — SIGNIFICANT CHANGE UP (ref 0–0.3)
BILIRUB INDIRECT FLD-MCNC: >0.3 MG/DL — SIGNIFICANT CHANGE UP (ref 0.2–1)
BILIRUB SERPL-MCNC: 0.4 MG/DL — SIGNIFICANT CHANGE UP (ref 0.2–1.2)
BUN SERPL-MCNC: 8 MG/DL — SIGNIFICANT CHANGE UP (ref 7–23)
CALCIUM SERPL-MCNC: 9.3 MG/DL — SIGNIFICANT CHANGE UP (ref 8.4–10.5)
CHLORIDE SERPL-SCNC: 107 MMOL/L — SIGNIFICANT CHANGE UP (ref 96–108)
CO2 SERPL-SCNC: 23 MMOL/L — SIGNIFICANT CHANGE UP (ref 22–31)
CREAT SERPL-MCNC: 0.75 MG/DL — SIGNIFICANT CHANGE UP (ref 0.5–1.3)
EGFR: 121 ML/MIN/1.73M2 — SIGNIFICANT CHANGE UP
EOSINOPHIL # BLD AUTO: 0 K/UL — SIGNIFICANT CHANGE UP (ref 0–0.5)
EOSINOPHIL NFR BLD AUTO: 0 % — SIGNIFICANT CHANGE UP (ref 0–6)
GLUCOSE SERPL-MCNC: 93 MG/DL — SIGNIFICANT CHANGE UP (ref 70–99)
HCT VFR BLD CALC: 24.2 % — LOW (ref 39–50)
HGB BLD-MCNC: 8.4 G/DL — LOW (ref 13–17)
LDH SERPL L TO P-CCNC: 194 U/L — SIGNIFICANT CHANGE UP (ref 50–242)
LYMPHOCYTES # BLD AUTO: 0.42 K/UL — LOW (ref 1–3.3)
LYMPHOCYTES # BLD AUTO: 5.9 % — LOW (ref 13–44)
MAGNESIUM SERPL-MCNC: 1.6 MG/DL — SIGNIFICANT CHANGE UP (ref 1.6–2.6)
MANUAL SMEAR VERIFICATION: SIGNIFICANT CHANGE UP
MCHC RBC-ENTMCNC: 30.2 PG — SIGNIFICANT CHANGE UP (ref 27–34)
MCHC RBC-ENTMCNC: 34.7 GM/DL — SIGNIFICANT CHANGE UP (ref 32–36)
MCV RBC AUTO: 87.1 FL — SIGNIFICANT CHANGE UP (ref 80–100)
MONOCYTES # BLD AUTO: 1.45 K/UL — HIGH (ref 0–0.9)
MONOCYTES NFR BLD AUTO: 20.2 % — HIGH (ref 2–14)
NEUTROPHILS # BLD AUTO: 5.32 K/UL — SIGNIFICANT CHANGE UP (ref 1.8–7.4)
NEUTROPHILS NFR BLD AUTO: 73.9 % — SIGNIFICANT CHANGE UP (ref 43–77)
PHOSPHATE SERPL-MCNC: 4 MG/DL — SIGNIFICANT CHANGE UP (ref 2.5–4.5)
PLAT MORPH BLD: NORMAL — SIGNIFICANT CHANGE UP
PLATELET # BLD AUTO: 25 K/UL — LOW (ref 150–400)
POTASSIUM SERPL-MCNC: 3.9 MMOL/L — SIGNIFICANT CHANGE UP (ref 3.5–5.3)
POTASSIUM SERPL-SCNC: 3.9 MMOL/L — SIGNIFICANT CHANGE UP (ref 3.5–5.3)
PROT SERPL-MCNC: 6.5 G/DL — SIGNIFICANT CHANGE UP (ref 6–8.3)
RBC # BLD: 2.78 M/UL — LOW (ref 4.2–5.8)
RBC # FLD: 13.5 % — SIGNIFICANT CHANGE UP (ref 10.3–14.5)
RBC BLD AUTO: SIGNIFICANT CHANGE UP
SODIUM SERPL-SCNC: 139 MMOL/L — SIGNIFICANT CHANGE UP (ref 135–145)
WBC # BLD: 7.2 K/UL — SIGNIFICANT CHANGE UP (ref 3.8–10.5)
WBC # FLD AUTO: 7.2 K/UL — SIGNIFICANT CHANGE UP (ref 3.8–10.5)

## 2023-06-30 PROCEDURE — 99238 HOSP IP/OBS DSCHRG MGMT 30/<: CPT

## 2023-06-30 RX ORDER — TACROLIMUS 5 MG/1
3 CAPSULE ORAL
Qty: 180 | Refills: 3
Start: 2023-06-30 | End: 2023-10-27

## 2023-06-30 RX ADMIN — Medication 1 MILLIGRAM(S): at 11:41

## 2023-06-30 RX ADMIN — FLUCONAZOLE 400 MILLIGRAM(S): 150 TABLET ORAL at 11:42

## 2023-06-30 RX ADMIN — FAMOTIDINE 20 MILLIGRAM(S): 10 INJECTION INTRAVENOUS at 06:38

## 2023-06-30 RX ADMIN — Medication 2 MILLIGRAM(S): at 09:23

## 2023-06-30 RX ADMIN — MAGNESIUM OXIDE 400 MG ORAL TABLET 400 MILLIGRAM(S): 241.3 TABLET ORAL at 07:56

## 2023-06-30 RX ADMIN — Medication 1 SPRAY(S): at 07:09

## 2023-06-30 RX ADMIN — MYCOPHENOLATE MOFETIL 1000 MILLIGRAM(S): 250 CAPSULE ORAL at 13:49

## 2023-06-30 RX ADMIN — Medication 5 MILLILITER(S): at 11:40

## 2023-06-30 RX ADMIN — Medication 10 MILLILITER(S): at 11:43

## 2023-06-30 RX ADMIN — Medication 5 MILLILITER(S): at 07:58

## 2023-06-30 RX ADMIN — LORATADINE 10 MILLIGRAM(S): 10 TABLET ORAL at 11:42

## 2023-06-30 RX ADMIN — Medication 400 MILLIGRAM(S): at 06:38

## 2023-06-30 RX ADMIN — URSODIOL 300 MILLIGRAM(S): 250 TABLET, FILM COATED ORAL at 06:38

## 2023-06-30 RX ADMIN — MAGNESIUM OXIDE 400 MG ORAL TABLET 400 MILLIGRAM(S): 241.3 TABLET ORAL at 11:41

## 2023-06-30 RX ADMIN — Medication 1 LOZENGE: at 07:54

## 2023-06-30 RX ADMIN — Medication 1 TABLET(S): at 11:42

## 2023-06-30 RX ADMIN — Medication 1 LOZENGE: at 00:16

## 2023-06-30 RX ADMIN — Medication 1 LOZENGE: at 11:40

## 2023-06-30 RX ADMIN — PIPERACILLIN AND TAZOBACTAM 25 GRAM(S): 4; .5 INJECTION, POWDER, LYOPHILIZED, FOR SOLUTION INTRAVENOUS at 07:08

## 2023-06-30 RX ADMIN — CHLORHEXIDINE GLUCONATE 1 APPLICATION(S): 213 SOLUTION TOPICAL at 06:39

## 2023-06-30 RX ADMIN — Medication 5 MILLILITER(S): at 00:16

## 2023-06-30 RX ADMIN — TACROLIMUS 4.5 MILLIGRAM(S): 5 CAPSULE ORAL at 07:55

## 2023-06-30 RX ADMIN — Medication 1 SPRAY(S): at 07:08

## 2023-06-30 RX ADMIN — MYCOPHENOLATE MOFETIL 1000 MILLIGRAM(S): 250 CAPSULE ORAL at 06:38

## 2023-06-30 RX ADMIN — LETERMOVIR 480 MILLIGRAM(S): 480 TABLET, FILM COATED ORAL at 12:16

## 2023-06-30 NOTE — DISCHARGE NOTE PROVIDER - CARE PROVIDER_API CALL
Tristan Wilder  Medical Oncology  [address] [place] [address]  Phone: [phone]  Fax: [phone]  Follow Up Time:

## 2023-06-30 NOTE — DISCHARGE NOTE PROVIDER - NSDCFUSCHEDAPPT_GEN_ALL_CORE_FT
Maximo England  [facility] Physician [facility]  UROLOGY [address] [facility]  Scheduled Appointment: 08/15/2023

## 2023-06-30 NOTE — PROGRESS NOTE ADULT - NS ATTEND OPT1 GEN_ALL_CORE

## 2023-06-30 NOTE — PROCEDURE NOTE - ADDITIONAL PROCEDURE DETAILS
Removed right IJ TLC per protocol. Patient tolerated procedure without complications. Sterile gauze placed. Notified RN to monitor site.

## 2023-06-30 NOTE — PROGRESS NOTE ADULT - PROVIDER SPECIALTY LIST ADULT
BMT/SCT
Heme/Onc
BMT/SCT
Heme/Onc
BMT/SCT

## 2023-06-30 NOTE — DISCHARGE NOTE NURSING/CASE MANAGEMENT/SOCIAL WORK - NSDCPEFALRISK_GEN_ALL_CORE
For information on Fall & Injury Prevention, visit: [email or web address] OR  [email or web address] OR  https://www.cdc.gov/steadi/patient.html

## 2023-06-30 NOTE — DISCHARGE NOTE NURSING/CASE MANAGEMENT/SOCIAL WORK - PATIENT PORTAL LINK FT
You can access the FollowMyHealth Patient Portal offered by [facility] by registering at the following website: [email or web address]. By joining [email or web address]’s FollowMyHealth portal, you will also be able to view your health information using other applications (apps) compatible with our system.

## 2023-06-30 NOTE — DISCHARGE NOTE PROVIDER - INSTRUCTIONS
Diet and activities as per [facility] discharge guidelines and safe food handling guidelines. NO RESTAURANT OR TAKE OUT FOOD AT THIS TIME, ONLY HOME COOKED PREPARED/FROZEN FOODS. You are allowed to have fresh baked pizza right out of the oven. This is the ONLY takeout food at this time.

## 2023-06-30 NOTE — DISCHARGE NOTE PROVIDER - HOSPITAL COURSE
This is a 37 year old male with severe aplastic anemia admitted for a haplo-identical bone marrow from his sister (6/12) with Flu / Cy / TBI prep regimen. Hematologic history as follows: initially diagnosed by bone marrow biopsy 4/20, began treatment 4/23/20 with horse ATG + CSA + promacta and achieved CR1. A bone marrow biopsy 11/9/20 showed hypocellular marrow, with overall improved cellularity showing marrow regeneration. Bone marrow biopsy 5/18/21 showed normal morphology and normal cellularity. He relapsed 11/2021, developing worsening cytopenias and neutropenic fevers. He was then treated with rabbit ATG, prednisone, CSA and promacta with partial response 12/27/21. He later developed worsening hemolysis and pancytopenia, with a rising PNH clone and was treated with Ravulizumab.     Upon admission, a TLC was placed in IR. Mr. Puckett received IV hydration, pain management, nutritional support, and antibacterial / antiviral / antifungal / VOD / GI and PCP prophylaxis. Labs were monitored on a daily basis, and he received transfusional support and electrolyte repletion as needed. While admitted, Mr. Puckett experienced pancytopenia related to the high dose chemotherapy prep regimen. When he became neutropenic, he was started on prophylactic ciprofloxacin. He also had neutropenic fevers. When he became febrile, blood and urine cultures were sent, a CXR completed the ciprofloxacin was changed to cefepime.     On 6/8/23, after pre-medication Mr. Puckett received 548 mL of allogeneic, related, fresh, pooled, plasma reduced, haplo-identical HPC marrow for over approximately 90 min. Cell counts as follows  Total MNC( x10^8/kg)=21.08  CD34+cells ( x10^6 kg)=1.65  CD3+cells( x10^7)=2.94  Cell Viability (%)= 99  During the infusion Mr. Puckett developed hives and required additional dose of benadryl and hydrocortisone injection.     Post transplant, on 6/12/23 he experienced a fever attributed to CRS post transplant. He was treated with a dose of tocilizumab. On 6/20/23, he developed clinical sinusitis, the cefepime was changed to zosyn. He completed a 10 day course. Pansinusitis was confirmed on a CT maxillofacial / orbit. Opthalmology was consulted on 6/21/23 for eye pain. Mr. Puckett also experienced chemotherapy induced diarrhea. C. diff was negative on 6/11/23.    Engraftment was noted on 6/26/23. The daily zarxio was discontinued on 6/30/23, as was the zosyn. Post engraftment a FISH for Y was sent to determine chimerism, results are pending. A CMV PCR was sent as well.     Currently, Mr. Puckett is stable for discharge home with outpatient follow up at the [facility].

## 2023-06-30 NOTE — DISCHARGE NOTE PROVIDER - NSDCMRMEDTOKEN_GEN_ALL_CORE_FT
acyclovir 400 mg oral tablet: 1 tab(s) orally every 12 hours  atovaquone 750 mg/5 mL oral suspension: 5 milliliter(s) orally 2 times a day  fluconazole 200 mg oral tablet: 2 tab(s) orally once a day  folic acid 1 mg oral tablet: 1 tab(s) orally once a day  hydroCHLOROthiazide 25 mg oral tablet: 1 tab(s) orally once a day MDD:1  letermovir 480 mg oral tablet: 1 tab(s) orally once a day  magnesium oxide 400 mg oral tablet: 1 tab(s) orally 3 times a day (with meals)  Multiple Vitamins oral tablet: 1 tab(s) orally once a day  mycophenolate mofetil 500 mg oral tablet: 2 tab(s) orally 3 times a day  ondansetron 8 mg oral tablet: 1 tab(s) orally every 8 hours as needed for  nausea  Pepcid 20 mg oral tablet: 1 tab(s) orally 2 times a day  tacrolimus 1 mg oral capsule: 3 cap(s) orally 2 times a day  ursodiol 300 mg oral capsule: 1 cap(s) orally 2 times a day

## 2023-06-30 NOTE — PROGRESS NOTE ADULT - SUBJECTIVE AND OBJECTIVE BOX
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 () with Flu / Cy / TBI prep regimen for treatment of severe aplastic anemia    S: Patient seen and examined with HPC Transplant Team:   No complaints    O:  rest of ROS negative    O: Vitals:   Vital Signs Last 24 Hrs  T(C): 36.8 (2023 06:05), Max: 36.9 (2023 09:20)  T(F): 98.3 (2023 06:05), Max: 98.5 (2023 09:20)  HR: 81 (2023 06:05) (81 - 95)  BP: 103/60 (2023 06:05) (103/60 - 137/84)  BP(mean): --  RR: 18 (2023 06:05) (18 - 18)  SpO2: 98% (2023 06:05) (97% - 100%)    Parameters below as of 2023 06:05  Patient On (Oxygen Delivery Method): room air    Admit weight:   Daily     Daily Weight in k.1 (2023 08:50)    Intake / Output:    @ 07:01  -  30 @ 07:00  --------------------------------------------------------  IN: 1228 mL / OUT: 500 mL / NET: 728 mL    PE:   Oropharynx: no erythema or ulcers  Oral Mucositis: (-)                                                        Grade: -  CVS: RRR, +S1,S2  Lungs: CTA B/L  Abdomen: Soft, NT, ND, +BS  Extremities: No edema in BLE  Gastric Mucositis: (-)                                                 Grade: -  Intestinal Mucositis:   (-)                                           Grade: -  Skin: no rashes  TLC: CDI  Neuro: Alert, O x 3  Pain: Denies      Labs:   CBC Full  -  ( 2023 07:18 )  WBC Count : 7.20 K/uL  Hemoglobin : 8.4 g/dL  Hematocrit : 24.2 %  Platelet Count - Automated : 25 K/uL  Mean Cell Volume : 87.1 fl  Mean Cell Hemoglobin : 30.2 pg  Mean Cell Hemoglobin Concentration : 34.7 gm/dL  Auto Neutrophil # : 5.32 K/uL  Auto Lymphocyte # : 0.42 K/uL  Auto Monocyte # : 1.45 K/uL  Auto Eosinophil # : 0.00 K/uL  Auto Basophil # : 0.00 K/uL  Auto Neutrophil % : 73.9 %  Auto Lymphocyte % : 5.9 %  Auto Monocyte % : 20.2 %  Auto Eosinophil % : 0.0 %  Auto Basophil % : 0.0 %                          8.4    7.20  )-----------( 25       ( 2023 07:18 )             24.2         139  |  107  |  8   ----------------------------<  93  3.9   |  23  |  0.75    Ca    9.3      2023 07:18  Phos  4.0       Mg     1.6         TPro  6.5  /  Alb  4.2  /  TBili  0.4  /  DBili  <0.1  /  AST  26  /  ALT  79<H>  /  AlkPhos  106      PT/INR - ( 2023 07:20 )   PT: 13.9 sec;   INR: 1.21 ratio         PTT - ( 2023 07:20 )  PTT:30.1 sec  LIVER FUNCTIONS - ( 2023 07:18 )  Alb: 4.2 g/dL / Pro: 6.5 g/dL / ALK PHOS: 106 U/L / ALT: 79 U/L / AST: 26 U/L / GGT: x           Lactate Dehydrogenase, Serum: 194 U/L ( @ 07:18)      Meds:   Antimicrobials:   acyclovir   Oral Tab/Cap 400 milliGRAM(s) Oral every 12 hours  clotrimazole Lozenge 1 Lozenge Oral five times a day  fluconAZOLE   Tablet 400 milliGRAM(s) Oral daily  letermovir 480 milliGRAM(s) Oral daily  piperacillin/tazobactam IVPB.. 4.5 Gram(s) IV Intermittent every 8 hours      Heme / Onc:   heparin  Infusion 424 Unit(s)/Hr IV Continuous <Continuous>      GI:  famotidine    Tablet 20 milliGRAM(s) Oral two times a day  loperamide 2 milliGRAM(s) Oral every 4 hours PRN  senna 1 Tablet(s) Oral at bedtime PRN  sodium bicarbonate Mouth Rinse 10 milliLiter(s) Swish and Spit five times a day  ursodiol Capsule 300 milliGRAM(s) Oral two times a day      Cardiovascular:       Immunologic:   filgrastim-sndz (ZARXIO) Injectable (Chemo) 480 MICROGram(s) SubCutaneous every 24 hours  mycophenolate mofetil 1000 milliGRAM(s) Oral three times a day  tacrolimus 4.5 milliGRAM(s) Oral <User Schedule>      Other medications:   acetaminophen     Tablet .. 650 milliGRAM(s) Oral every 6 hours  Biotene Dry Mouth Oral Rinse 5 milliLiter(s) Swish and Spit five times a day  Chemotherapy Worklist Order      chlorhexidine 4% Liquid 1 Application(s) Topical <User Schedule>  fluticasone propionate 50 MICROgram(s)/spray Nasal Spray 1 Spray(s) Both Nostrils two times a day  folic acid 1 milliGRAM(s) Oral daily  hydrocortisone sodium succinate Injectable 50 milliGRAM(s) IV Push every 24 hours  Infuse HPC Product      lidocaine/prilocaine Cream (Chemo) 1 Application(s) Topical daily  loratadine 10 milliGRAM(s) Oral daily  magnesium oxide 400 milliGRAM(s) Oral three times a day with meals  multivitamin 1 Tablet(s) Oral daily  sodium bicarbonate  Infusion (Chemo) 0.074 mEq/kG/Hr IV Continuous <Continuous>  sodium chloride 0.65% Nasal 1 Spray(s) Both Nostrils two times a day  sodium chloride 0.9% 1000 milliLiter(s) IV Continuous <Continuous>  sodium chloride 0.9%. 1000 milliLiter(s) IV Continuous <Continuous>  sodium chloride 0.9%. (Chemo) 1000 milliLiter(s) IV Continuous <Continuous>      PRN:   acetaminophen     Tablet .. 650 milliGRAM(s) Oral every 6 hours PRN  acetaminophen 325 mG/butalbital 50 mG/caffeine 40 mG 1 Tablet(s) Oral every 6 hours PRN  dibucaine 1% Ointment 1 Application(s) Topical two times a day PRN  diphenhydrAMINE 50 milliGRAM(s) Oral every 6 hours PRN  guaiFENesin  milliGRAM(s) Oral every 12 hours PRN  hemorrhoidal Ointment 1 Application(s) Rectal three times a day PRN  hydrocortisone sodium succinate Injectable 50 milliGRAM(s) IV Push every 6 hours PRN  loperamide 2 milliGRAM(s) Oral every 4 hours PRN  prochlorperazine   Injectable 10 milliGRAM(s) IV Push every 6 hours PRN  pseudoephedrine 30 milliGRAM(s) Oral every 6 hours PRN  senna 1 Tablet(s) Oral at bedtime PRN  sodium chloride 0.9% lock flush 10 milliLiter(s) IV Push every 1 hour PRN  witch hazel Pads 1 Application(s) Topical four times a day PRN      A/P: 37 year old male with severe aplastic anemia with multiple lines of treatment admitted for a haplo-identical bone marrow transplant with  Flu / Cy / TBI preparative regimen   Day +   Strict I&O, daily weights, prn diuresis   Day -6 - day -2 - Fludarabine 30mg/m2 IV  Day -6 - day - 5 - CTX 14.5mg/kg/day IV. CTX to start after urine SG < 1.010. Mesna  12mg / kg - hemorrhagic cystitis ppx   Day -2 - IVIG 0.4 g/kg (ideal body weight) every 3 weeks. Premedication with Tylenol and benadryl   Day -1 -  cGy x 1 dose   Day - 1 - at 2200 begin transplant hydration : 0.45Ns + 1 amp (50mEq) sodium bicarbonate at 150ml /hr; continue transplant hydration for 24 hours post infusion    Day + 2 at 2200 - begin CTX hydration (0.45NS + 10 mEq KCl/ @150ml /m2  continue 24 hours post last dose of CTX   Day + 3 - + 4 - CTX 50mg/kg/day IV. Begin CTX when urine SG < 1.010. EKG daily. Mesna for hemorraghic cystitis ppx.   Day + 5 - start Zarxio,  MMF and Tacrolimus. Check Tacrolimus levels on Monday and Thursday.   when ANC < 500, start Cipro 500mg po BID. If becomes febrile, pan cx, CXR and change Cipro to Cefepime 2g IV q 8 hours. Continue until count recovery  - Fludarabine /, intermittent nausea, continue antiemetics   - Fludarabine /, IVIG  6/7 TBI   - HPC transplant today. Continue transplant hydration for 24 hours post infusion of bone marrow   - monitor for CRS / haplo storm - if T >/= 102.5 on days +1 or +2, would dose tocilizumab 8mg / kg IVPB x 1    C.Diff negative, started imodium  - post transplant CTX 2.2 - continue CTX hydration for 24 hours post infusion of last dose , Tocilizumab x1 dose O/N for temp of 39.1.    eye pain overnight, no visual acuity deficits. Opthalmology evaluating - appreciated - optic disc is sharp/pink, overall suggesting against a diagnosis of optic neuritis. Follow up CT maxillofacial/orbits -   - CT maxillofacial / orbits consistent with pansinusitis, continue zosyn, nasal sprays    Increased Tacrolimus 4.5mg bid, D/C planning    1. Infectious Disease:   acyclovir   Oral Tab/Cap 400 milliGRAM(s) Oral every 12 hours  clotrimazole Lozenge 1 Lozenge Oral five times a day  fluconAZOLE   Tablet 400 milliGRAM(s) Oral daily  letermovir 480 milliGRAM(s) Oral daily  piperacillin/tazobactam IVPB.. 4.5 Gram(s) IV Intermittent every 8 hours    2. VOD Prophylaxis: Actigall, Glutamine, Heparin (dosed at 100 units / kg / day)     3. GI Prophylaxis:    famotidine    Tablet 20 milliGRAM(s) Oral two times a day    4. Mouthcare - NS / NaHCO3 rinses, Mycelex, Biotene; Skin care     5. GVHD prophylaxis   TBI day -1   CTX days +3, +4   mycophenolate mofetil 1000 milliGRAM(s) Oral three times a day  tacrolimus 4.5 milliGRAM(s) Oral <User Schedule>    6. Transfuse & replete electrolytes prn   Replete Mg magnesium sulfate  IVPB 2 Gram(s) IV Intermittent every 2 hours    7. IV hydration, daily weights, strict I&O, prn diuresis     8. PO intake as tolerated, nutrition follow up as needed, MVI, folic acid     9. Antiemetics, anti-diarrhea medications:   loperamide 2 milliGRAM(s) Oral every 4 hours PRN  prochlorperazine   Injectable 10 milliGRAM(s) IV Push every 6 hours PRN    10. OOB as tolerated, physical therapy consult if needed     11. Monitor coags / fibrinogen 2x week, vitamin K as needed     12. Monitor closely for clinical changes, monitor for fevers     13. Emotional support provided, plan of care discussed and questions addressed     14. Patient education done regarding plan of care, restrictions and discharge planning     15. Continue regular social work input     I have written the above note for Dr. Lima who performed service with me in the room.   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 () with Flu / Cy / TBI prep regimen for treatment of severe aplastic anemia    S: Patient seen and examined with HPC Transplant Team:   No complaints    O:  rest of ROS negative    O: Vitals:   Vital Signs Last 24 Hrs  T(C): 36.8 (2023 06:05), Max: 36.9 (2023 09:20)  T(F): 98.3 (2023 06:05), Max: 98.5 (2023 09:20)  HR: 81 (2023 06:05) (81 - 95)  BP: 103/60 (2023 06:05) (103/60 - 137/84)  BP(mean): --  RR: 18 (2023 06:05) (18 - 18)  SpO2: 98% (2023 06:05) (97% - 100%)    Parameters below as of 2023 06:05  Patient On (Oxygen Delivery Method): room air    Admit weight: 102.8kg  Daily 95.2kg  Daily Weight in k.1 (2023 08:50)    Intake / Output:    @ 07:01  -   @ 07:00  --------------------------------------------------------  IN: 1228 mL / OUT: 500 mL / NET: 728 mL    PE:   Oropharynx: no erythema or ulcers  Oral Mucositis: (-)                                                        Grade: -  CVS: RRR, +S1,S2  Lungs: CTA B/L  Abdomen: Soft, NT, ND, +BS  Extremities: No edema in BLE  Gastric Mucositis: (-)                                                 Grade: -  Intestinal Mucositis:   (-)                                           Grade: -  Skin: no rashes  TLC: CDI  Neuro: Alert, O x 3  Pain: Denies      Labs:   CBC Full  -  ( 2023 07:18 )  WBC Count : 7.20 K/uL  Hemoglobin : 8.4 g/dL  Hematocrit : 24.2 %  Platelet Count - Automated : 25 K/uL  Mean Cell Volume : 87.1 fl  Mean Cell Hemoglobin : 30.2 pg  Mean Cell Hemoglobin Concentration : 34.7 gm/dL  Auto Neutrophil # : 5.32 K/uL  Auto Lymphocyte # : 0.42 K/uL  Auto Monocyte # : 1.45 K/uL  Auto Eosinophil # : 0.00 K/uL  Auto Basophil # : 0.00 K/uL  Auto Neutrophil % : 73.9 %  Auto Lymphocyte % : 5.9 %  Auto Monocyte % : 20.2 %  Auto Eosinophil % : 0.0 %  Auto Basophil % : 0.0 %                          8.4    7.20  )-----------( 25       ( 2023 07:18 )             24.2         139  |  107  |  8   ----------------------------<  93  3.9   |  23  |  0.75    Ca    9.3      2023 07:18  Phos  4.0       Mg     1.6         TPro  6.5  /  Alb  4.2  /  TBili  0.4  /  DBili  <0.1  /  AST  26  /  ALT  79<H>  /  AlkPhos  106      PT/INR - ( 2023 07:20 )   PT: 13.9 sec;   INR: 1.21 ratio         PTT - ( 2023 07:20 )  PTT:30.1 sec  LIVER FUNCTIONS - ( 2023 07:18 )  Alb: 4.2 g/dL / Pro: 6.5 g/dL / ALK PHOS: 106 U/L / ALT: 79 U/L / AST: 26 U/L / GGT: x           Lactate Dehydrogenase, Serum: 194 U/L ( @ 07:18)      Meds:   Antimicrobials:   acyclovir   Oral Tab/Cap 400 milliGRAM(s) Oral every 12 hours  clotrimazole Lozenge 1 Lozenge Oral five times a day  fluconAZOLE   Tablet 400 milliGRAM(s) Oral daily  letermovir 480 milliGRAM(s) Oral daily  piperacillin/tazobactam IVPB.. 4.5 Gram(s) IV Intermittent every 8 hours      Heme / Onc:   heparin  Infusion 424 Unit(s)/Hr IV Continuous <Continuous>      GI:  famotidine    Tablet 20 milliGRAM(s) Oral two times a day  loperamide 2 milliGRAM(s) Oral every 4 hours PRN  senna 1 Tablet(s) Oral at bedtime PRN  sodium bicarbonate Mouth Rinse 10 milliLiter(s) Swish and Spit five times a day  ursodiol Capsule 300 milliGRAM(s) Oral two times a day      Immunologic:   filgrastim-sndz (ZARXIO) Injectable (Chemo) 480 MICROGram(s) SubCutaneous every 24 hours  mycophenolate mofetil 1000 milliGRAM(s) Oral three times a day  tacrolimus 4.5 milliGRAM(s) Oral <User Schedule>      Other medications:   acetaminophen     Tablet .. 650 milliGRAM(s) Oral every 6 hours  Biotene Dry Mouth Oral Rinse 5 milliLiter(s) Swish and Spit five times a day  Chemotherapy Worklist Order      chlorhexidine 4% Liquid 1 Application(s) Topical <User Schedule>  fluticasone propionate 50 MICROgram(s)/spray Nasal Spray 1 Spray(s) Both Nostrils two times a day  folic acid 1 milliGRAM(s) Oral daily  hydrocortisone sodium succinate Injectable 50 milliGRAM(s) IV Push every 24 hours  Infuse HPC Product      lidocaine/prilocaine Cream (Chemo) 1 Application(s) Topical daily  loratadine 10 milliGRAM(s) Oral daily  magnesium oxide 400 milliGRAM(s) Oral three times a day with meals  multivitamin 1 Tablet(s) Oral daily  sodium bicarbonate  Infusion (Chemo) 0.074 mEq/kG/Hr IV Continuous <Continuous>  sodium chloride 0.65% Nasal 1 Spray(s) Both Nostrils two times a day  sodium chloride 0.9% 1000 milliLiter(s) IV Continuous <Continuous>  sodium chloride 0.9%. 1000 milliLiter(s) IV Continuous <Continuous>  sodium chloride 0.9%. (Chemo) 1000 milliLiter(s) IV Continuous <Continuous>      PRN:   acetaminophen     Tablet .. 650 milliGRAM(s) Oral every 6 hours PRN  acetaminophen 325 mG/butalbital 50 mG/caffeine 40 mG 1 Tablet(s) Oral every 6 hours PRN  dibucaine 1% Ointment 1 Application(s) Topical two times a day PRN  diphenhydrAMINE 50 milliGRAM(s) Oral every 6 hours PRN  guaiFENesin  milliGRAM(s) Oral every 12 hours PRN  hemorrhoidal Ointment 1 Application(s) Rectal three times a day PRN  hydrocortisone sodium succinate Injectable 50 milliGRAM(s) IV Push every 6 hours PRN  loperamide 2 milliGRAM(s) Oral every 4 hours PRN  prochlorperazine   Injectable 10 milliGRAM(s) IV Push every 6 hours PRN  pseudoephedrine 30 milliGRAM(s) Oral every 6 hours PRN  senna 1 Tablet(s) Oral at bedtime PRN  sodium chloride 0.9% lock flush 10 milliLiter(s) IV Push every 1 hour PRN  witch hazel Pads 1 Application(s) Topical four times a day PRN      A/P: 37 year old male with severe aplastic anemia with multiple lines of treatment admitted for a haplo-identical bone marrow transplant with  Flu / Cy / TBI preparative regimen   Day +   Strict I&O, daily weights, prn diuresis   Day -6 - day -2 - Fludarabine 30mg/m2 IV  Day -6 - day - 5 - CTX 14.5mg/kg/day IV. CTX to start after urine SG < 1.010. Mesna  12mg / kg - hemorrhagic cystitis ppx   Day -2 - IVIG 0.4 g/kg (ideal body weight) every 3 weeks. Premedication with Tylenol and benadryl   Day -1 -  cGy x 1 dose   Day - 1 - at 2200 begin transplant hydration : 0.45Ns + 1 amp (50mEq) sodium bicarbonate at 150ml /hr; continue transplant hydration for 24 hours post infusion    Day + 2 at 2200 - begin CTX hydration (0.45NS + 10 mEq KCl/ @150ml /m2  continue 24 hours post last dose of CTX   Day + 3 - + 4 - CTX 50mg/kg/day IV. Begin CTX when urine SG < 1.010. EKG daily. Mesna for hemorraghic cystitis ppx.   Day + 5 - start Zarxio,  MMF and Tacrolimus. Check Tacrolimus levels on Monday and Thursday.   when ANC < 500, start Cipro 500mg po BID. If becomes febrile, pan cx, CXR and change Cipro to Cefepime 2g IV q 8 hours. Continue until count recovery  /- Fludarabine /, intermittent nausea, continue antiemetics   - Fludarabine , IVIG  /7 TBI   - HPC transplant today. Continue transplant hydration for 24 hours post infusion of bone marrow   - monitor for CRS / haplo storm - if T >/= 102.5 on days +1 or +2, would dose tocilizumab 8mg / kg IVPB x 1    C.Diff negative, started imodium  - post transplant CTX 2.2 - continue CTX hydration for 24 hours post infusion of last dose , Tocilizumab x1 dose O/N for temp of 39.1.    eye pain overnight, no visual acuity deficits. Opthalmology evaluating - appreciated - optic disc is sharp/pink, overall suggesting against a diagnosis of optic neuritis. Follow up CT maxillofacial/orbits -   - CT maxillofacial / orbits consistent with pansinusitis, continue zosyn, nasal sprays    Increased Tacrolimus 4.5mg bid, D/C planning   Discharge today      1. Infectious Disease:   acyclovir   Oral Tab/Cap 400 milliGRAM(s) Oral every 12 hours  clotrimazole Lozenge 1 Lozenge Oral five times a day  fluconAZOLE   Tablet 400 milliGRAM(s) Oral daily  letermovir 480 milliGRAM(s) Oral daily  piperacillin/tazobactam IVPB.. 4.5 Gram(s) IV Intermittent every 8 hours    2. VOD Prophylaxis: Actigall, Glutamine, Heparin (dosed at 100 units / kg / day)     3. GI Prophylaxis:    famotidine    Tablet 20 milliGRAM(s) Oral two times a day    4. Mouthcare - NS / NaHCO3 rinses, Mycelex, Biotene; Skin care     5. GVHD prophylaxis   TBI day -1   CTX days +3, +4   mycophenolate mofetil 1000 milliGRAM(s) Oral three times a day  tacrolimus 4.5 milliGRAM(s) Oral <User Schedule>    6. Transfuse & replete electrolytes prn   Replete Mg magnesium sulfate  IVPB 2 Gram(s) IV Intermittent every 2 hours    7. IV hydration, daily weights, strict I&O, prn diuresis     8. PO intake as tolerated, nutrition follow up as needed, MVI, folic acid     9. Antiemetics, anti-diarrhea medications:   loperamide 2 milliGRAM(s) Oral every 4 hours PRN  prochlorperazine   Injectable 10 milliGRAM(s) IV Push every 6 hours PRN    10. OOB as tolerated, physical therapy consult if needed     11. Monitor coags / fibrinogen 2x week, vitamin K as needed     12. Monitor closely for clinical changes, monitor for fevers     13. Emotional support provided, plan of care discussed and questions addressed     14. Patient education done regarding plan of care, restrictions and discharge planning     15. Continue regular social work input     I have written the above note for Dr. Lima who performed service with me in the room.   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 () with Flu / Cy / TBI prep regimen for treatment of severe aplastic anemia    S: Patient seen and examined with HPC Transplant Team:   No complaints    O: Vitals:   Vital Signs Last 24 Hrs  T(C): 36.8 (2023 06:05), Max: 36.9 (2023 09:20)  T(F): 98.3 (2023 06:05), Max: 98.5 (2023 09:20)  HR: 81 (2023 06:05) (81 - 95)  BP: 103/60 (2023 06:05) (103/60 - 137/84)  BP(mean): --  RR: 18 (2023 06:05) (18 - 18)  SpO2: 98% (2023 06:05) (97% - 100%)    Parameters below as of 2023 06:05  Patient On (Oxygen Delivery Method): room air    Admit weight: 102.8kg  Daily 95.2kg  Daily Weight in k.1 (2023 08:50)    Intake / Output:    @ 07:01  -  -30 @ 07:00  --------------------------------------------------------  IN: 1228 mL / OUT: 500 mL / NET: 728 mL    PE:   Oropharynx: no erythema or ulcers  Oral Mucositis: (-)                                                        Grade: -  CVS: RRR, +S1,S2  Lungs: CTA B/L  Abdomen: Soft, NT, ND, +BS  Extremities: No edema in BLE  Gastric Mucositis: (-)                                                 Grade: -  Intestinal Mucositis:   (-)                                           Grade: -  Skin: no rashes  TLC: CDI  Neuro: Alert, O x 3  Pain: Denies      Labs:   CBC Full  -  ( 2023 07:18 )  WBC Count : 7.20 K/uL  Hemoglobin : 8.4 g/dL  Hematocrit : 24.2 %  Platelet Count - Automated : 25 K/uL  Mean Cell Volume : 87.1 fl  Mean Cell Hemoglobin : 30.2 pg  Mean Cell Hemoglobin Concentration : 34.7 gm/dL  Auto Neutrophil # : 5.32 K/uL  Auto Lymphocyte # : 0.42 K/uL  Auto Monocyte # : 1.45 K/uL  Auto Eosinophil # : 0.00 K/uL  Auto Basophil # : 0.00 K/uL  Auto Neutrophil % : 73.9 %  Auto Lymphocyte % : 5.9 %  Auto Monocyte % : 20.2 %  Auto Eosinophil % : 0.0 %  Auto Basophil % : 0.0 %                          8.4    7.20  )-----------( 25       ( 2023 07:18 )             24.2         139  |  107  |  8   ----------------------------<  93  3.9   |  23  |  0.75    Ca    9.3      2023 07:18  Phos  4.0       Mg     1.6         TPro  6.5  /  Alb  4.2  /  TBili  0.4  /  DBili  <0.1  /  AST  26  /  ALT  79<H>  /  AlkPhos  106      PT/INR - ( 2023 07:20 )   PT: 13.9 sec;   INR: 1.21 ratio         PTT - ( 2023 07:20 )  PTT:30.1 sec  LIVER FUNCTIONS - ( 2023 07:18 )  Alb: 4.2 g/dL / Pro: 6.5 g/dL / ALK PHOS: 106 U/L / ALT: 79 U/L / AST: 26 U/L / GGT: x           Lactate Dehydrogenase, Serum: 194 U/L ( @ 07:18)      Meds:   Antimicrobials:   acyclovir   Oral Tab/Cap 400 milliGRAM(s) Oral every 12 hours  clotrimazole Lozenge 1 Lozenge Oral five times a day  fluconAZOLE   Tablet 400 milliGRAM(s) Oral daily  letermovir 480 milliGRAM(s) Oral daily  piperacillin/tazobactam IVPB.. 4.5 Gram(s) IV Intermittent every 8 hours      Heme / Onc:   heparin  Infusion 424 Unit(s)/Hr IV Continuous <Continuous>      GI:  famotidine    Tablet 20 milliGRAM(s) Oral two times a day  loperamide 2 milliGRAM(s) Oral every 4 hours PRN  senna 1 Tablet(s) Oral at bedtime PRN  sodium bicarbonate Mouth Rinse 10 milliLiter(s) Swish and Spit five times a day  ursodiol Capsule 300 milliGRAM(s) Oral two times a day      Immunologic:   filgrastim-sndz (ZARXIO) Injectable (Chemo) 480 MICROGram(s) SubCutaneous every 24 hours  mycophenolate mofetil 1000 milliGRAM(s) Oral three times a day  tacrolimus 4.5 milliGRAM(s) Oral <User Schedule>      Other medications:   acetaminophen     Tablet .. 650 milliGRAM(s) Oral every 6 hours  Biotene Dry Mouth Oral Rinse 5 milliLiter(s) Swish and Spit five times a day  Chemotherapy Worklist Order      chlorhexidine 4% Liquid 1 Application(s) Topical <User Schedule>  fluticasone propionate 50 MICROgram(s)/spray Nasal Spray 1 Spray(s) Both Nostrils two times a day  folic acid 1 milliGRAM(s) Oral daily  hydrocortisone sodium succinate Injectable 50 milliGRAM(s) IV Push every 24 hours  Infuse HPC Product      lidocaine/prilocaine Cream (Chemo) 1 Application(s) Topical daily  loratadine 10 milliGRAM(s) Oral daily  magnesium oxide 400 milliGRAM(s) Oral three times a day with meals  multivitamin 1 Tablet(s) Oral daily  sodium bicarbonate  Infusion (Chemo) 0.074 mEq/kG/Hr IV Continuous <Continuous>  sodium chloride 0.65% Nasal 1 Spray(s) Both Nostrils two times a day  sodium chloride 0.9% 1000 milliLiter(s) IV Continuous <Continuous>  sodium chloride 0.9%. 1000 milliLiter(s) IV Continuous <Continuous>  sodium chloride 0.9%. (Chemo) 1000 milliLiter(s) IV Continuous <Continuous>      PRN:   acetaminophen     Tablet .. 650 milliGRAM(s) Oral every 6 hours PRN  acetaminophen 325 mG/butalbital 50 mG/caffeine 40 mG 1 Tablet(s) Oral every 6 hours PRN  dibucaine 1% Ointment 1 Application(s) Topical two times a day PRN  diphenhydrAMINE 50 milliGRAM(s) Oral every 6 hours PRN  guaiFENesin  milliGRAM(s) Oral every 12 hours PRN  hemorrhoidal Ointment 1 Application(s) Rectal three times a day PRN  hydrocortisone sodium succinate Injectable 50 milliGRAM(s) IV Push every 6 hours PRN  loperamide 2 milliGRAM(s) Oral every 4 hours PRN  prochlorperazine   Injectable 10 milliGRAM(s) IV Push every 6 hours PRN  pseudoephedrine 30 milliGRAM(s) Oral every 6 hours PRN  senna 1 Tablet(s) Oral at bedtime PRN  sodium chloride 0.9% lock flush 10 milliLiter(s) IV Push every 1 hour PRN  witch hazel Pads 1 Application(s) Topical four times a day PRN      A/P: 37 year old male with severe aplastic anemia with multiple lines of treatment admitted for a haplo-identical bone marrow transplant with  Flu / Cy / TBI preparative regimen   Day +   Strict I&O, daily weights, prn diuresis   Day -6 - day -2 - Fludarabine 30mg/m2 IV  Day -6 - day - 5 - CTX 14.5mg/kg/day IV. CTX to start after urine SG < 1.010. Mesna  12mg / kg - hemorrhagic cystitis ppx   Day -2 - IVIG 0.4 g/kg (ideal body weight) every 3 weeks. Premedication with Tylenol and benadryl   Day -1 -  cGy x 1 dose   Day - 1 - at 2200 begin transplant hydration : 0.45Ns + 1 amp (50mEq) sodium bicarbonate at 150ml /hr; continue transplant hydration for 24 hours post infusion    Day + 2 at 2200 - begin CTX hydration (0.45NS + 10 mEq KCl/ @150ml /m2  continue 24 hours post last dose of CTX   Day + 3 - + 4 - CTX 50mg/kg/day IV. Begin CTX when urine SG < 1.010. EKG daily. Mesna for hemorraghic cystitis ppx.   Day + 5 - start Zarxio,  MMF and Tacrolimus. Check Tacrolimus levels on Monday and Thursday.   when ANC < 500, start Cipro 500mg po BID. If becomes febrile, pan cx, CXR and change Cipro to Cefepime 2g IV q 8 hours. Continue until count recovery  - Fludarabine /, intermittent nausea, continue antiemetics   - Fludarabine /, IVIG  6/7 TBI   - HPC transplant today. Continue transplant hydration for 24 hours post infusion of bone marrow   - monitor for CRS / haplo storm - if T >/= 102.5 on days +1 or +2, would dose tocilizumab 8mg / kg IVPB x 1    C.Diff negative, started imodium  - post transplant CTX 2.2 - continue CTX hydration for 24 hours post infusion of last dose , Tocilizumab x1 dose O/N for temp of 39.1.    eye pain overnight, no visual acuity deficits. Opthalmology evaluating - appreciated - optic disc is sharp/pink, overall suggesting against a diagnosis of optic neuritis. Follow up CT maxillofacial/orbits -   - CT maxillofacial / orbits consistent with pansinusitis, continue zosyn, nasal sprays    Increased Tacrolimus 4.5mg bid, D/C planning   Discharge today      1. Infectious Disease:   acyclovir   Oral Tab/Cap 400 milliGRAM(s) Oral every 12 hours  clotrimazole Lozenge 1 Lozenge Oral five times a day  fluconAZOLE   Tablet 400 milliGRAM(s) Oral daily  letermovir 480 milliGRAM(s) Oral daily  piperacillin/tazobactam IVPB.. 4.5 Gram(s) IV Intermittent every 8 hours    2. VOD Prophylaxis: Actigall, Glutamine, Heparin (dosed at 100 units / kg / day)     3. GI Prophylaxis:    famotidine    Tablet 20 milliGRAM(s) Oral two times a day    4. Mouthcare - NS / NaHCO3 rinses, Mycelex, Biotene; Skin care     5. GVHD prophylaxis   TBI day -1   CTX days +3, +4   mycophenolate mofetil 1000 milliGRAM(s) Oral three times a day  tacrolimus 4.5 milliGRAM(s) Oral <User Schedule>    6. Transfuse & replete electrolytes prn   Replete Mg magnesium sulfate  IVPB 2 Gram(s) IV Intermittent every 2 hours    7. IV hydration, daily weights, strict I&O, prn diuresis     8. PO intake as tolerated, nutrition follow up as needed, MVI, folic acid     9. Antiemetics, anti-diarrhea medications:   loperamide 2 milliGRAM(s) Oral every 4 hours PRN  prochlorperazine   Injectable 10 milliGRAM(s) IV Push every 6 hours PRN    10. OOB as tolerated, physical therapy consult if needed     11. Monitor coags / fibrinogen 2x week, vitamin K as needed     12. Monitor closely for clinical changes, monitor for fevers     13. Emotional support provided, plan of care discussed and questions addressed     14. Patient education done regarding plan of care, restrictions and discharge planning     15. Continue regular social work input     I have written the above note for Dr. Lima who performed service with me in the room.   Ana Flowers NP ([phone])

## 2023-06-30 NOTE — DISCHARGE NOTE NURSING/CASE MANAGEMENT/SOCIAL WORK - NSDCVIVACCINE_GEN_ALL_CORE_FT
Meningococcal MCV4O; 09-Aug-2022 12:16; Erika Freeman (RN); Qminder; [id number] (Exp. Date: 23-Jan-2023); IntraMuscular; Deltoid Left.; 0.5 milliLiter(s); VIS (VIS Published: 08-Sep-2022, VIS Presented: 09-Aug-2022);   Meningococcal MCV4O; 04-Oct-2022 09:24; Erika Freeman); Qminder; [id number] (Exp. Date: 02-Jan-2023); IntraMuscular; Deltoid Right.; 0.5 milliLiter(s); VIS (VIS Published: 10-Apr-2022, VIS Presented: 04-Oct-2022);

## 2023-06-30 NOTE — DISCHARGE NOTE PROVIDER - NSDCFUADDAPPT_GEN_ALL_CORE_FT
You have the following appointments at the [facility]   You have a lab appointment on Monday, 7/3/23 at 9:30am   **On the day you have an appointment at the [facility], do NOT take your Tacrolimus morning dose. Instead, bring it with you to the [facility]. After you have your bloods drawn you may take your morning dose of Tacrolimus.    Wednesday, 7/5/23 at 11:30am with Christine Denis NP   Monday, 7/10/23 at 1:30pm with Christine Denis NP   Wednesday, 7/19/23 at 1pm with Dr. Wilder     Please arrive to all appointments 1 hour early to have your blood drawn

## 2023-06-30 NOTE — DISCHARGE NOTE PROVIDER - NSDCCPCAREPLAN_GEN_ALL_CORE_FT
PRINCIPAL DISCHARGE DIAGNOSIS  Diagnosis: Stem cells transplant status  Assessment and Plan of Treatment: 1. Diet and activities as per [facility] discharge guidelines and safe food handling guidelines. NO RESTAURANT OR TAKE OUT FOOD AT THIS TIME, ONLY HOME COOKED PREPARED/FROZEN FOODS. You are allowed to have fresh baked pizza right out of the oven. This is the ONLY takeout food at this time.  2. Notify your physician if bleeding; swelling; persistent nausea and vomiting; unable to urinate; pain not relieved by medications; fever; numbness, tingling; excessive diarrhea, inability to tolerate liquids or foods; increased irritability or sluggishness, rash  3. On the day you have an appointment at the [facility], do NOT take your Tacrolimus morning dose. Instead, bring it with you to the [facility]. After you have your bloods drawn you may take your morning dose of Tacrolimus.        SECONDARY DISCHARGE DIAGNOSES  Diagnosis: Stem cells transplant status  Assessment and Plan of Treatment: 1. Continue your propylactic medications as directed by Dr. Wilder

## 2023-07-01 PROCEDURE — 85384 FIBRINOGEN ACTIVITY: CPT

## 2023-07-01 PROCEDURE — 86900 BLOOD TYPING SEROLOGIC ABO: CPT

## 2023-07-01 PROCEDURE — 86850 RBC ANTIBODY SCREEN: CPT

## 2023-07-01 PROCEDURE — 87086 URINE CULTURE/COLONY COUNT: CPT

## 2023-07-01 PROCEDURE — 36556 INSERT NON-TUNNEL CV CATH: CPT

## 2023-07-01 PROCEDURE — 86901 BLOOD TYPING SEROLOGIC RH(D): CPT

## 2023-07-01 PROCEDURE — 85025 COMPLETE CBC W/AUTO DIFF WBC: CPT

## 2023-07-01 PROCEDURE — 80197 ASSAY OF TACROLIMUS: CPT

## 2023-07-01 PROCEDURE — 88275 CYTOGENETICS 100-300: CPT

## 2023-07-01 PROCEDURE — 85730 THROMBOPLASTIN TIME PARTIAL: CPT

## 2023-07-01 PROCEDURE — C1894: CPT

## 2023-07-01 PROCEDURE — 94640 AIRWAY INHALATION TREATMENT: CPT

## 2023-07-01 PROCEDURE — 86923 COMPATIBILITY TEST ELECTRIC: CPT

## 2023-07-01 PROCEDURE — 36415 COLL VENOUS BLD VENIPUNCTURE: CPT

## 2023-07-01 PROCEDURE — 85610 PROTHROMBIN TIME: CPT

## 2023-07-01 PROCEDURE — 81005 URINALYSIS: CPT

## 2023-07-01 PROCEDURE — 87324 CLOSTRIDIUM AG IA: CPT

## 2023-07-01 PROCEDURE — 81003 URINALYSIS AUTO W/O SCOPE: CPT

## 2023-07-01 PROCEDURE — 82784 ASSAY IGA/IGD/IGG/IGM EACH: CPT

## 2023-07-01 PROCEDURE — 88271 CYTOGENETICS DNA PROBE: CPT

## 2023-07-01 PROCEDURE — 81001 URINALYSIS AUTO W/SCOPE: CPT

## 2023-07-01 PROCEDURE — 87205 SMEAR GRAM STAIN: CPT

## 2023-07-01 PROCEDURE — P9100: CPT

## 2023-07-01 PROCEDURE — 86367 STEM CELLS TOTAL COUNT: CPT

## 2023-07-01 PROCEDURE — 87449 NOS EACH ORGANISM AG IA: CPT

## 2023-07-01 PROCEDURE — 70487 CT MAXILLOFACIAL W/DYE: CPT

## 2023-07-01 PROCEDURE — 84100 ASSAY OF PHOSPHORUS: CPT

## 2023-07-01 PROCEDURE — 87077 CULTURE AEROBIC IDENTIFY: CPT

## 2023-07-01 PROCEDURE — 76937 US GUIDE VASCULAR ACCESS: CPT

## 2023-07-01 PROCEDURE — 77001 FLUOROGUIDE FOR VEIN DEVICE: CPT

## 2023-07-01 PROCEDURE — 36430 TRANSFUSION BLD/BLD COMPNT: CPT

## 2023-07-01 PROCEDURE — 71045 X-RAY EXAM CHEST 1 VIEW: CPT

## 2023-07-01 PROCEDURE — P9037: CPT

## 2023-07-01 PROCEDURE — P9040: CPT

## 2023-07-01 PROCEDURE — 82785 ASSAY OF IGE: CPT

## 2023-07-01 PROCEDURE — 82955 ASSAY OF G6PD ENZYME: CPT

## 2023-07-01 PROCEDURE — 85045 AUTOMATED RETICULOCYTE COUNT: CPT

## 2023-07-01 PROCEDURE — 93005 ELECTROCARDIOGRAM TRACING: CPT

## 2023-07-01 PROCEDURE — 88237 TISSUE CULTURE BONE MARROW: CPT

## 2023-07-01 PROCEDURE — 83735 ASSAY OF MAGNESIUM: CPT

## 2023-07-01 PROCEDURE — 85027 COMPLETE CBC AUTOMATED: CPT

## 2023-07-01 PROCEDURE — 82247 BILIRUBIN TOTAL: CPT

## 2023-07-01 PROCEDURE — 83615 LACTATE (LD) (LDH) ENZYME: CPT

## 2023-07-01 PROCEDURE — C1769: CPT

## 2023-07-01 PROCEDURE — 38214 VOLUME DEPLETE OF HARVEST: CPT

## 2023-07-01 PROCEDURE — 82248 BILIRUBIN DIRECT: CPT

## 2023-07-01 PROCEDURE — 87507 IADNA-DNA/RNA PROBE TQ 12-25: CPT

## 2023-07-01 PROCEDURE — 80053 COMPREHEN METABOLIC PANEL: CPT

## 2023-07-01 PROCEDURE — 38207 CRYOPRESERVE STEM CELLS: CPT

## 2023-07-01 PROCEDURE — 87040 BLOOD CULTURE FOR BACTERIA: CPT

## 2023-07-03 ENCOUNTER — RESULT REVIEW (OUTPATIENT)
Age: 38
End: 2023-07-03

## 2023-07-03 ENCOUNTER — APPOINTMENT (OUTPATIENT)
Dept: HEMATOLOGY ONCOLOGY | Facility: CLINIC | Age: 38
End: 2023-07-03

## 2023-07-03 LAB
ALBUMIN SERPL ELPH-MCNC: 4.9 G/DL
ALP BLD-CCNC: 115 U/L
ALT SERPL-CCNC: 113 U/L
ANION GAP SERPL CALC-SCNC: 13 MMOL/L
AST SERPL-CCNC: 47 U/L
BASOPHILS # BLD AUTO: 0.01 K/UL — SIGNIFICANT CHANGE UP (ref 0–0.2)
BASOPHILS NFR BLD AUTO: 1 % — SIGNIFICANT CHANGE UP (ref 0–2)
BILIRUB SERPL-MCNC: 0.3 MG/DL
BUN SERPL-MCNC: 13 MG/DL
CALCIUM SERPL-MCNC: 9.5 MG/DL
CHLORIDE SERPL-SCNC: 106 MMOL/L
CO2 SERPL-SCNC: 22 MMOL/L
CREAT SERPL-MCNC: 0.74 MG/DL
EGFR: 121 ML/MIN/1.73M2
EOSINOPHIL # BLD AUTO: 0 K/UL — SIGNIFICANT CHANGE UP (ref 0–0.5)
EOSINOPHIL NFR BLD AUTO: 0 % — SIGNIFICANT CHANGE UP (ref 0–6)
GLUCOSE SERPL-MCNC: 127 MG/DL
HCT VFR BLD CALC: 29 % — LOW (ref 39–50)
HGB BLD-MCNC: 10.2 G/DL — LOW (ref 13–17)
LDH SERPL-CCNC: 253 U/L
LYMPHOCYTES # BLD AUTO: 0.16 K/UL — LOW (ref 1–3.3)
LYMPHOCYTES # BLD AUTO: 13 % — SIGNIFICANT CHANGE UP (ref 13–44)
MAGNESIUM SERPL-MCNC: 1.4 MG/DL
MCHC RBC-ENTMCNC: 30.9 PG — SIGNIFICANT CHANGE UP (ref 27–34)
MCHC RBC-ENTMCNC: 35.2 G/DL — SIGNIFICANT CHANGE UP (ref 32–36)
MCV RBC AUTO: 87.8 FL — SIGNIFICANT CHANGE UP (ref 80–100)
METAMYELOCYTES # FLD: 1 % — HIGH (ref 0–0)
MONOCYTES # BLD AUTO: 0.4 K/UL — SIGNIFICANT CHANGE UP (ref 0–0.9)
MONOCYTES NFR BLD AUTO: 32 % — HIGH (ref 2–14)
NEUTROPHILS # BLD AUTO: 0.66 K/UL — LOW (ref 1.8–7.4)
NEUTROPHILS NFR BLD AUTO: 53 % — SIGNIFICANT CHANGE UP (ref 43–77)
NRBC # BLD: 1 /100 — HIGH (ref 0–0)
NRBC # BLD: SIGNIFICANT CHANGE UP /100 WBCS (ref 0–0)
PLAT MORPH BLD: NORMAL — SIGNIFICANT CHANGE UP
PLATELET # BLD AUTO: 38 K/UL — LOW (ref 150–400)
POLYCHROMASIA BLD QL SMEAR: SLIGHT — SIGNIFICANT CHANGE UP
POTASSIUM SERPL-SCNC: 4 MMOL/L
PROT SERPL-MCNC: 7 G/DL
RBC # BLD: 3.3 M/UL — LOW (ref 4.2–5.8)
RBC # FLD: 16.3 % — HIGH (ref 10.3–14.5)
RBC BLD AUTO: SIGNIFICANT CHANGE UP
SODIUM SERPL-SCNC: 141 MMOL/L
TACROLIMUS SERPL-MCNC: 5.7 NG/ML
WBC # BLD: 1.24 K/UL — LOW (ref 3.8–10.5)
WBC # FLD AUTO: 1.24 K/UL — LOW (ref 3.8–10.5)

## 2023-07-05 ENCOUNTER — APPOINTMENT (OUTPATIENT)
Dept: HEMATOLOGY ONCOLOGY | Facility: CLINIC | Age: 38
End: 2023-07-05
Payer: COMMERCIAL

## 2023-07-05 ENCOUNTER — RESULT REVIEW (OUTPATIENT)
Age: 38
End: 2023-07-05

## 2023-07-05 VITALS
TEMPERATURE: 98.6 F | HEIGHT: 67.99 IN | RESPIRATION RATE: 16 BRPM | OXYGEN SATURATION: 99 % | WEIGHT: 208.2 LBS | HEART RATE: 102 BPM | BODY MASS INDEX: 31.55 KG/M2 | SYSTOLIC BLOOD PRESSURE: 127 MMHG | DIASTOLIC BLOOD PRESSURE: 84 MMHG

## 2023-07-05 LAB
ALBUMIN SERPL ELPH-MCNC: 5.3 G/DL
ALP BLD-CCNC: 119 U/L
ALT SERPL-CCNC: 129 U/L
ANION GAP SERPL CALC-SCNC: 12 MMOL/L
AST SERPL-CCNC: 52 U/L
BASOPHILS # BLD AUTO: 0.03 K/UL — SIGNIFICANT CHANGE UP (ref 0–0.2)
BASOPHILS NFR BLD AUTO: 1.4 % — SIGNIFICANT CHANGE UP (ref 0–2)
BILIRUB SERPL-MCNC: 0.4 MG/DL
BUN SERPL-MCNC: 14 MG/DL
CALCIUM SERPL-MCNC: 10.1 MG/DL
CHLORIDE SERPL-SCNC: 105 MMOL/L
CMV DNA SPEC QL NAA+PROBE: NOT DETECTED IU/ML
CMVPCR LOG: NOT DETECTED LOG10IU/ML
CO2 SERPL-SCNC: 25 MMOL/L
CREAT SERPL-MCNC: 0.82 MG/DL
EGFR: 117 ML/MIN/1.73M2
EOSINOPHIL # BLD AUTO: 0.02 K/UL — SIGNIFICANT CHANGE UP (ref 0–0.5)
EOSINOPHIL NFR BLD AUTO: 0.9 % — SIGNIFICANT CHANGE UP (ref 0–6)
GLUCOSE SERPL-MCNC: 120 MG/DL
HCT VFR BLD CALC: 31.5 % — LOW (ref 39–50)
HGB BLD-MCNC: 11.1 G/DL — LOW (ref 13–17)
IMM GRANULOCYTES NFR BLD AUTO: 0.5 % — SIGNIFICANT CHANGE UP (ref 0–0.9)
LDH SERPL-CCNC: 279 U/L
LYMPHOCYTES # BLD AUTO: 0.35 K/UL — LOW (ref 1–3.3)
LYMPHOCYTES # BLD AUTO: 16.4 % — SIGNIFICANT CHANGE UP (ref 13–44)
MAGNESIUM SERPL-MCNC: 1.5 MG/DL
MCHC RBC-ENTMCNC: 31.3 PG — SIGNIFICANT CHANGE UP (ref 27–34)
MCHC RBC-ENTMCNC: 35.2 G/DL — SIGNIFICANT CHANGE UP (ref 32–36)
MCV RBC AUTO: 88.7 FL — SIGNIFICANT CHANGE UP (ref 80–100)
MONOCYTES # BLD AUTO: 0.9 K/UL — SIGNIFICANT CHANGE UP (ref 0–0.9)
MONOCYTES NFR BLD AUTO: 42.1 % — HIGH (ref 2–14)
NEUTROPHILS # BLD AUTO: 0.83 K/UL — LOW (ref 1.8–7.4)
NEUTROPHILS NFR BLD AUTO: 38.7 % — LOW (ref 43–77)
NRBC # BLD: 0 /100 WBCS — SIGNIFICANT CHANGE UP (ref 0–0)
PLATELET # BLD AUTO: 60 K/UL — LOW (ref 150–400)
POTASSIUM SERPL-SCNC: 4.9 MMOL/L
PROT SERPL-MCNC: 7.6 G/DL
RBC # BLD: 3.55 M/UL — LOW (ref 4.2–5.8)
RBC # FLD: 18.6 % — HIGH (ref 10.3–14.5)
SODIUM SERPL-SCNC: 141 MMOL/L
TACROLIMUS SERPL-MCNC: 4.4 NG/ML
WBC # BLD: 2.14 K/UL — LOW (ref 3.8–10.5)
WBC # FLD AUTO: 2.14 K/UL — LOW (ref 3.8–10.5)

## 2023-07-05 PROCEDURE — 99215 OFFICE O/P EST HI 40 MIN: CPT

## 2023-07-05 RX ORDER — CYCLOSPORINE 100 MG/ML
100 SOLUTION ORAL TWICE DAILY
Qty: 150 | Refills: 2 | Status: DISCONTINUED | COMMUNITY
Start: 2022-03-24 | End: 2023-07-05

## 2023-07-05 RX ORDER — MULTIVIT-MIN/IRON/FOLIC ACID/K 18-600-40
CAPSULE ORAL
Refills: 0 | Status: DISCONTINUED | COMMUNITY
End: 2023-07-05

## 2023-07-05 RX ORDER — ONDANSETRON 8 MG/1
8 TABLET, ORALLY DISINTEGRATING ORAL EVERY 8 HOURS
Qty: 90 | Refills: 2 | Status: ACTIVE | COMMUNITY
Start: 2022-02-24

## 2023-07-05 RX ORDER — ALBUTEROL SULFATE 2.5 MG/.5ML
2.5 SOLUTION RESPIRATORY (INHALATION)
Refills: 0 | Status: DISCONTINUED | COMMUNITY
Start: 2020-12-01 | End: 2023-07-05

## 2023-07-05 RX ORDER — FOLIC ACID 1 MG/1
1 TABLET ORAL TWICE DAILY
Qty: 60 | Refills: 3 | Status: DISCONTINUED | COMMUNITY
Start: 2022-12-01 | End: 2023-07-05

## 2023-07-05 NOTE — ASSESSMENT
[FreeTextEntry1] : This is a 37 year old male with severe aplastic anemia admitted for a haplo-identical bone marrow from his sister (6/12) with Flu / Cy / TBI prep regimen. Hematologic history as follows: initially diagnosed by bone marrow \par biopsy 4/20, began treatment 4/23/20 with horse ATG + CSA + promacta and achieved CR1. A bone marrow biopsy 11/9/20 showed hypocellular marrow, with overall improved cellularity showing marrow regeneration. Bone marrow biopsy 5/18/21 showed normal morphology and normal cellularity. He relapsed 11/2021, developing worsening cytopenias and neutropenic fevers. He was then treated with rabbit ATG, prednisone, CSA and promacta with partial response 12/27/21. He later developed worsening hemolysis and pancytopenia, with a rising PNH clone and was treated with Ravulizumab. Status post Haplo identical bone marrow transplant from his sister on 6/8/23.\par \par 1) Severe aplastic anemia.\par -Status post Haplo identical bone marrow transplant from his sister on 6/8/23.\par -Pending Fish for Y\par \par 2) Heme\par Counts stable. No indication for transfusions today.\par 7/5/23: WBC 2.14  H/H 11.1/31.5  PLT 60  ANC 0.83\par Continue folic acid 1 mg oral tablet: 1 tab(s) orally once a day and Multiple Vitamins oral tablet: 1 tab(s) orally once a day.\par \par 3) ID\par Continue ppx:\par acyclovir 400 mg oral tablet: 1 tab(s) orally every 12 hours \par atovaquone 750 mg/5 mL oral suspension: 5 milliliter(s) orally 2 times a day \par fluconazole 200 mg oral tablet: 2 tab(s) orally once a day \par letermovir 480 mg oral tablet: 1 tab(s) orally once a day- Patient has not started as of 7/5/23. Pharmacy will deliver hopefully today. \par \par GVHD\par Skin 0 Liver 0 GI 0- overall grade 0\par On FK 4.5 mg BID- pending today's level.\par On Cellcept 1000 mg TID. On Day 30 ( 7/8/23), decrease cellcept to 1000 mg BID.\par Reviewed signs and symptoms of GVHD.\par \par Pending CMV PCR \par \par 4) GI\par Continue ppx:\par ondansetron 8 mg oral tablet: 1 tab(s) orally every 8 hours as needed for nausea \par Pepcid 20 mg oral tablet: 1 tab(s) orally 2 times a day \par \par Transaminitis:\par 7/3/23: AST 47, , Alk Phos 115. Continue to monitor\par \par \par 5) Other\par Continue:\par magnesium oxide 400 mg oral tablet: 1 tab(s) orally 3 times a day (with meals) \par ursodiol 300 mg oral capsule: 1 cap(s) orally 2 times a day \par \par 6) Plan\par Continue weekly appointments at this time.\par Continue post transplant diet and crowd restrictions.\par NO RESTAURANT OR TAKE OUT FOOD AT THIS TIME, ONLY HOME COOKED PREPARED/FROZEN FOODS. You are allowed to have fresh baked pizza right out of the oven. This is the ONLY takeout food at this time. \par Notify your physician if bleeding; swelling; persistent nausea and vomiting; unable to urinate; pain not relieved by medications; fever; numbness, tingling; excessive diarrhea, inability to tolerate liquids or foods; increased \par irritability or sluggishness, rash \par Follow up with REANNA King in one week\par

## 2023-07-05 NOTE — REVIEW OF SYSTEMS
[Negative] : Allergic/Immunologic [Fatigue] : fatigue [Recent Change In Weight] : ~T recent weight change [Fever] : no fever [Chills] : no chills [Night Sweats] : no night sweats [Skin Rash] : no skin rash [Skin Wound] : no skin wound [FreeTextEntry2] : Weight loss  [de-identified] :  Right index finger hyperpigmented

## 2023-07-05 NOTE — REASON FOR VISIT
[Follow-Up Visit] : a follow-up visit for [Friend] : friend [FreeTextEntry2] : MADAY s/p haplo identical bone marrow transplant from his sister on 6/8/23.

## 2023-07-05 NOTE — PHYSICAL EXAM
[Restricted in physically strenuous activity but ambulatory and able to carry out work of a light or sedentary nature] : Status 1- Restricted in physically strenuous activity but ambulatory and able to carry out work of a light or sedentary nature, e.g., light house work, office work [Normal] : affect appropriate [Yes] : Yes [Cellcept] : Cellcept [] :  [No active (erythematous_ GVHD rash)] : Skin: No active (erythematous_ GVHD rash) [< 2 mg/dl] : Liver: < 2 mg/dl [No or intermittent] : Upper GI: No or intermittent nausea, vomiting or anorexia [<500 ml/day or < 3 episodes/day] : Lower GI (stool output/day): <500 ml/day or <3 episodes/day [No] : No [de-identified] :  Right index finger hyperpigmented [FreeTextEntry1] : 06/08/2023

## 2023-07-05 NOTE — HISTORY OF PRESENT ILLNESS
[de-identified] : 38 y/o M with no significant PMHx diagnosed with very severe aplastic anemia in April 2020. H confirmed on bone marrow biopsy at [facility] and now s/p inpatient treatment beginning 4/23/2020 with horse ATG + CsA + promacta with complete remission. Bone marrow bx on 11/9/20 revealed hypocellular marrow, overall cellularity improved (20-50%) showing marrow regeneration. BMBx on 5/18/2021 showing normal morphology with normal cellularity. Unfortunately patient relapsed in November 2021 with worsening cytopenias and neutropenic fever. He was admitted for treatment with rabbit ATG, prednisone, cyclosporine and Promacta with partial response  starting 12/27/21. 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 He reports doing well with ravulizumab and is able to continue working full time.\par He denies bleeding, bruising, fever, chills, night sweats, appetite changes.\par \par He presents today with his partner for follow up. He reports feeling okay, he continues to work full time. His energy, activity, and appetite are good. He however does feel fatigued when his Hb is low.  We discussed the plans for transplant. We will likely used his haplo sister as a donor and are completing work up for the [facility] as back up. He is completing his pre-testing in the coming weeks. He would like to proceed with transplant at the end of April to the beginning of May.\par \par \par PMHX:\par Asthma\par \par Allergy:\par NKDA\par \par SocialHx:\par Lives in  with partner and her family\par No children\par Manages oil terminal \par Denies smoking, ETOH use, illicit drug use\par \par \par FamilyHx:\par Father – unknown\par Mother – HTN\par Siblings – no medical history\par \par Dentist: needs to go\par  [de-identified] : This is a 37 year old male with severe aplastic anemia admitted for a haplo-identical bone marrow from his sister (6/12) with Flu / Cy / TBI prep regimen. Hematologic history as follows: initially diagnosed by bone marrow biopsy 4/20, began treatment 4/23/20 with horse ATG + CSA + promacta and achieved CR1. A bone marrow biopsy 11/9/20 showed hypocellular marrow, with overall improved cellularity showing marrow regeneration. Bone marrow biopsy 5/18/21 showed normal morphology and normal cellularity. He relapsed 11/2021, developing worsening cytopenias and neutropenic fevers. He was then treated with rabbit ATG, prednisone, CSA and promacta with partial response 12/27/21. \par He later developed worsening hemolysis and pancytopenia, with a rising PNH clone and was treated with Ravulizumab. \par \par Upon admission, a TLC was placed in IR. Mr. Puckett received IV hydration, pain management, nutritional support, and antibacterial / antiviral / antifungal / VOD / GI and PCP prophylaxis. Labs were monitored on a daily basis, and he received transfusional support and electrolyte repletion as needed. While admitted, Mr. Puckett experienced pancytopenia related to the high dose chemotherapy prep regimen. When he became neutropenic, he was started on prophylactic ciprofloxacin. He also had neutropenic fevers. When he became febrile, blood and urine cultures were sent, a CXR completed the ciprofloxacin was changed to cefepime. \par \par On 6/8/23, after pre-medication Mr. Puckett received 548 mL of allogeneic, related, fresh, pooled, plasma reduced, haplo-identical HPC marrow for over approximately 90 min. Cell counts as follows \par Total MNC( x10^8/kg)=21.08 \par CD34+cells ( x10^6 kg)=1.65 \par CD3+cells( x10^7)=2.94 \par Cell Viability (%)= 99 \par During the infusion Mr. Puckett developed hives and required additional dose \par of benadryl and hydrocortisone injection. \par \par Post transplant, on 6/12/23 he experienced a fever attributed to CRS post transplant. He was treated with a dose of tocilizumab. On 6/20/23, he developed clinical sinusitis, the cefepime was changed to zosyn. He completed a 10 day course. Pansinusitis was confirmed on a CT maxillofacial / orbit. Opthalmology was consulted on 6/21/23 for eye pain. Mr. Puckett also experienced chemotherapy induced diarrhea. C. diff was negative on 6/11/23. \par \par Engraftment was noted on 6/26/23. The daily zarxio was discontinued on 6/30/23, as was the zosyn. Post engraftment a FISH for Y was sent to determine chimerism, results are pending. A CMV PCR was sent as well. Currently, Mr. Puckett is stable for discharge home with outpatient follow up at the [facility]. \par \par On 7/5/23 visit, day + 27 post Haplo identical bone marrow transplant from his sister on 6/8/23. Overall fatigued but, feels well overall. Felt bumps on bilateral hands over the weekend. Right index finger hyperpigmented. On Cellcept 1000 mg TID and FK 4.5 mg BID. Did not take FK prior to blood work today. Denies fever, chills, nausea, vomiting, diarrhea, rash, mouth sores, dysuria or any signs of active bleeding. Denies SOB, chest pain or B/L LE edema. Remains compliant with post transplant medications. Remains compliant with post transplant diet and crowd restrictions.

## 2023-07-06 LAB
CMV DNA SPEC QL NAA+PROBE: NOT DETECTED IU/ML
CMVPCR LOG: NOT DETECTED LOG10IU/ML

## 2023-07-10 ENCOUNTER — RESULT REVIEW (OUTPATIENT)
Age: 38
End: 2023-07-10

## 2023-07-10 ENCOUNTER — APPOINTMENT (OUTPATIENT)
Dept: HEMATOLOGY ONCOLOGY | Facility: CLINIC | Age: 38
End: 2023-07-10
Payer: COMMERCIAL

## 2023-07-10 ENCOUNTER — LABORATORY RESULT (OUTPATIENT)
Age: 38
End: 2023-07-10

## 2023-07-10 VITALS
WEIGHT: 213.39 LBS | BODY MASS INDEX: 32.46 KG/M2 | DIASTOLIC BLOOD PRESSURE: 80 MMHG | TEMPERATURE: 97.9 F | RESPIRATION RATE: 15 BRPM | HEART RATE: 93 BPM | OXYGEN SATURATION: 99 % | SYSTOLIC BLOOD PRESSURE: 118 MMHG

## 2023-07-10 LAB
ALBUMIN SERPL ELPH-MCNC: 5 G/DL
ALP BLD-CCNC: 99 U/L
ALT SERPL-CCNC: 233 U/L
ANION GAP SERPL CALC-SCNC: 13 MMOL/L
ANISOCYTOSIS BLD QL: SLIGHT — SIGNIFICANT CHANGE UP
AST SERPL-CCNC: 90 U/L
BASOPHILS # BLD AUTO: 0.03 K/UL — SIGNIFICANT CHANGE UP (ref 0–0.2)
BASOPHILS NFR BLD AUTO: 2 % — SIGNIFICANT CHANGE UP (ref 0–2)
BILIRUB SERPL-MCNC: 0.4 MG/DL
BUN SERPL-MCNC: 13 MG/DL
CALCIUM SERPL-MCNC: 9.4 MG/DL
CHLORIDE SERPL-SCNC: 103 MMOL/L
CO2 SERPL-SCNC: 22 MMOL/L
CREAT SERPL-MCNC: 0.77 MG/DL
DACRYOCYTES BLD QL SMEAR: SLIGHT — SIGNIFICANT CHANGE UP
EGFR: 120 ML/MIN/1.73M2
EOSINOPHIL # BLD AUTO: 0.05 K/UL — SIGNIFICANT CHANGE UP (ref 0–0.5)
EOSINOPHIL NFR BLD AUTO: 3 % — SIGNIFICANT CHANGE UP (ref 0–6)
GLUCOSE SERPL-MCNC: 146 MG/DL
HCT VFR BLD CALC: 31.7 % — LOW (ref 39–50)
HGB BLD-MCNC: 11.5 G/DL — LOW (ref 13–17)
LDH SERPL-CCNC: 299 U/L
LYMPHOCYTES # BLD AUTO: 0.26 K/UL — LOW (ref 1–3.3)
LYMPHOCYTES # BLD AUTO: 17 % — SIGNIFICANT CHANGE UP (ref 13–44)
MAGNESIUM SERPL-MCNC: 1.4 MG/DL
MCHC RBC-ENTMCNC: 32.6 PG — SIGNIFICANT CHANGE UP (ref 27–34)
MCHC RBC-ENTMCNC: 36.3 G/DL — HIGH (ref 32–36)
MCV RBC AUTO: 89.8 FL — SIGNIFICANT CHANGE UP (ref 80–100)
MONOCYTES # BLD AUTO: 0.51 K/UL — SIGNIFICANT CHANGE UP (ref 0–0.9)
MONOCYTES NFR BLD AUTO: 33 % — HIGH (ref 2–14)
MYELOCYTES NFR BLD: 1 % — HIGH (ref 0–0)
NEUTROPHILS # BLD AUTO: 0.68 K/UL — LOW (ref 1.8–7.4)
NEUTROPHILS NFR BLD AUTO: 44 % — SIGNIFICANT CHANGE UP (ref 43–77)
NRBC # BLD: 0 /100 — SIGNIFICANT CHANGE UP (ref 0–0)
NRBC # BLD: SIGNIFICANT CHANGE UP /100 WBCS (ref 0–0)
PLAT MORPH BLD: NORMAL — SIGNIFICANT CHANGE UP
PLATELET # BLD AUTO: 108 K/UL — LOW (ref 150–400)
POIKILOCYTOSIS BLD QL AUTO: SLIGHT — SIGNIFICANT CHANGE UP
POLYCHROMASIA BLD QL SMEAR: SLIGHT — SIGNIFICANT CHANGE UP
POTASSIUM SERPL-SCNC: 4.4 MMOL/L
PROT SERPL-MCNC: 7.1 G/DL
RBC # BLD: 3.53 M/UL — LOW (ref 4.2–5.8)
RBC # FLD: SIGNIFICANT CHANGE UP (ref 10.3–14.5)
RBC BLD AUTO: ABNORMAL
SODIUM SERPL-SCNC: 138 MMOL/L
TACROLIMUS SERPL-MCNC: 9.7 NG/ML
WBC # BLD: 1.55 K/UL — LOW (ref 3.8–10.5)
WBC # FLD AUTO: 1.55 K/UL — LOW (ref 3.8–10.5)

## 2023-07-10 PROCEDURE — 99215 OFFICE O/P EST HI 40 MIN: CPT

## 2023-07-11 NOTE — REVIEW OF SYSTEMS
[Fatigue] : fatigue [Negative] : Allergic/Immunologic [Fever] : no fever [Chills] : no chills [Night Sweats] : no night sweats [Recent Change In Weight] : ~T no recent weight change [Eye Pain] : eye pain [Red Eyes] : eyes not red [Dry Eyes] : no dryness of the eyes [Vision Problems] : no vision problems [Skin Rash] : no skin rash [Skin Wound] : no skin wound [FreeTextEntry3] : Pain behind left eye [de-identified] :  Right index finger hyperpigmented

## 2023-07-11 NOTE — PHYSICAL EXAM
[Restricted in physically strenuous activity but ambulatory and able to carry out work of a light or sedentary nature] : Status 1- Restricted in physically strenuous activity but ambulatory and able to carry out work of a light or sedentary nature, e.g., light house work, office work [Normal] : affect appropriate [Yes] : Yes [Cellcept] : Cellcept [] :  [No active (erythematous_ GVHD rash)] : Skin: No active (erythematous_ GVHD rash) [< 2 mg/dl] : Liver: < 2 mg/dl [No or intermittent] : Upper GI: No or intermittent nausea, vomiting or anorexia [<500 ml/day or < 3 episodes/day] : Lower GI (stool output/day): <500 ml/day or <3 episodes/day [No] : No [de-identified] :  Right index finger hyperpigmented [FreeTextEntry1] : 06/08/2023

## 2023-07-11 NOTE — HISTORY OF PRESENT ILLNESS
[de-identified] : 38 y/o M with no significant PMHx diagnosed with very severe aplastic anemia in April 2020. H confirmed on bone marrow biopsy at [facility] and now s/p inpatient treatment beginning 4/23/2020 with horse ATG + CsA + promacta with complete remission. Bone marrow bx on 11/9/20 revealed hypocellular marrow, overall cellularity improved (20-50%) showing marrow regeneration. BMBx on 5/18/2021 showing normal morphology with normal cellularity. Unfortunately patient relapsed in November 2021 with worsening cytopenias and neutropenic fever. He was admitted for treatment with rabbit ATG, prednisone, cyclosporine and Promacta with partial response  starting 12/27/21. 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 He reports doing well with ravulizumab and is able to continue working full time.\par He denies bleeding, bruising, fever, chills, night sweats, appetite changes.\par \par He presents today with his partner for follow up. He reports feeling okay, he continues to work full time. His energy, activity, and appetite are good. He however does feel fatigued when his Hb is low.  We discussed the plans for transplant. We will likely used his haplo sister as a donor and are completing work up for the [facility] as back up. He is completing his pre-testing in the coming weeks. He would like to proceed with transplant at the end of April to the beginning of May.\par \par \par PMHX:\par Asthma\par \par Allergy:\par NKDA\par \par SocialHx:\par Lives in  with partner and her family\par No children\par Manages oil terminal \par Denies smoking, ETOH use, illicit drug use\par \par \par FamilyHx:\par Father – unknown\par Mother – HTN\par Siblings – no medical history\par \par Dentist: needs to go\par  [de-identified] : This is a 37 year old male with severe aplastic anemia admitted for a haplo-identical bone marrow from his sister (6/12) with Flu / Cy / TBI prep regimen. Hematologic history as follows: initially diagnosed by bone marrow biopsy 4/20, began treatment 4/23/20 with horse ATG + CSA + promacta and achieved CR1. A bone marrow biopsy 11/9/20 showed hypocellular marrow, with overall improved cellularity showing marrow regeneration. Bone marrow biopsy 5/18/21 showed normal morphology and normal cellularity. He relapsed 11/2021, developing worsening cytopenias and neutropenic fevers. He was then treated with rabbit ATG, prednisone, CSA and promacta with partial response 12/27/21. \par He later developed worsening hemolysis and pancytopenia, with a rising PNH clone and was treated with Ravulizumab. \par \par Upon admission, a TLC was placed in IR. Mr. Puckett received IV hydration, pain management, nutritional support, and antibacterial / antiviral / antifungal / VOD / GI and PCP prophylaxis. Labs were monitored on a daily basis, and he received transfusional support and electrolyte repletion as needed. While admitted, Mr. Puckett experienced pancytopenia related to the high dose chemotherapy prep regimen. When he became neutropenic, he was started on prophylactic ciprofloxacin. He also had neutropenic fevers. When he became febrile, blood and urine cultures were sent, a CXR completed the ciprofloxacin was changed to cefepime. \par \par On 6/8/23, after pre-medication Mr. Puckett received 548 mL of allogeneic, related, fresh, pooled, plasma reduced, haplo-identical HPC marrow for over approximately 90 min. Cell counts as follows \par Total MNC( x10^8/kg)=21.08 \par CD34+cells ( x10^6 kg)=1.65 \par CD3+cells( x10^7)=2.94 \par Cell Viability (%)= 99 \par During the infusion Mr. Puckett developed hives and required additional dose \par of benadryl and hydrocortisone injection. \par \par Post transplant, on 6/12/23 he experienced a fever attributed to CRS post transplant. He was treated with a dose of tocilizumab. On 6/20/23, he developed clinical sinusitis, the cefepime was changed to zosyn. He completed a 10 day course. Pansinusitis was confirmed on a CT maxillofacial / orbit. Opthalmology was consulted on 6/21/23 for eye pain. Mr. Puckett also experienced chemotherapy induced diarrhea. C. diff was negative on 6/11/23. \par \par Engraftment was noted on 6/26/23. The daily zarxio was discontinued on 6/30/23, as was the zosyn. Post engraftment a FISH for Y was sent to determine chimerism, results are pending. A CMV PCR was sent as well. Currently, Mr. Puckett is stable for discharge home with outpatient follow up at the [facility]. \par \par On 7/5/23 visit, day + 27 post Haplo identical bone marrow transplant from his sister on 6/8/23. Overall fatigued but, feels well overall. Felt bumps on bilateral hands over the weekend. Right index finger hyperpigmented. On Cellcept 1000 mg TID and FK 4.5 mg BID. Did not take FK prior to blood work today. Denies fever, chills, nausea, vomiting, diarrhea, rash, mouth sores, dysuria or any signs of active bleeding. Denies SOB, chest pain or B/L LE edema. Remains compliant with post transplant medications. Remains compliant with post transplant diet and crowd restrictions. \par \par On 7/10/23 visit, day + 37 post Allogeneic PBSCT. On Cellcept 1000 mg BID and FK 4.5 mg BID. Overall well with no acute concerns. Complains of fatigue and pain behind the left eye. Denies any vision changes. Denies fever, chills, nausea, vomiting, diarrhea, rash, mouth sores, dysuria or any signs of active bleeding. Denies SOB, chest pain or B/L LE edema. Remains compliant with post transplant medications. Remains compliant with post transplant diet and crowd restrictions.

## 2023-07-12 ENCOUNTER — OUTPATIENT (OUTPATIENT)
Dept: OUTPATIENT SERVICES | Facility: HOSPITAL | Age: 38
LOS: 1 days | Discharge: HOME | End: 2023-07-12

## 2023-07-12 DIAGNOSIS — Z98.890 OTHER SPECIFIED POSTPROCEDURAL STATES: Chronic | ICD-10-CM

## 2023-07-12 DIAGNOSIS — D61.9 APLASTIC ANEMIA, UNSPECIFIED: ICD-10-CM

## 2023-07-12 LAB
CMV DNA SPEC QL NAA+PROBE: ABNORMAL IU/ML
CMVPCR LOG: ABNORMAL LOG10IU/ML

## 2023-07-19 ENCOUNTER — RESULT REVIEW (OUTPATIENT)
Age: 38
End: 2023-07-19

## 2023-07-19 ENCOUNTER — APPOINTMENT (OUTPATIENT)
Dept: HEMATOLOGY ONCOLOGY | Facility: CLINIC | Age: 38
End: 2023-07-19
Payer: COMMERCIAL

## 2023-07-19 VITALS
TEMPERATURE: 96.5 F | BODY MASS INDEX: 31.88 KG/M2 | OXYGEN SATURATION: 99 % | SYSTOLIC BLOOD PRESSURE: 133 MMHG | HEIGHT: 67.95 IN | HEART RATE: 88 BPM | RESPIRATION RATE: 16 BRPM | WEIGHT: 210.32 LBS | DIASTOLIC BLOOD PRESSURE: 88 MMHG

## 2023-07-19 LAB
ALBUMIN SERPL ELPH-MCNC: 5.2 G/DL
ALP BLD-CCNC: 103 U/L
ALT SERPL-CCNC: 204 U/L
ANION GAP SERPL CALC-SCNC: 14 MMOL/L
AST SERPL-CCNC: 61 U/L
BASOPHILS # BLD AUTO: 0.07 K/UL — SIGNIFICANT CHANGE UP (ref 0–0.2)
BASOPHILS NFR BLD AUTO: 1 % — SIGNIFICANT CHANGE UP (ref 0–2)
BILIRUB SERPL-MCNC: 0.4 MG/DL
BUN SERPL-MCNC: 15 MG/DL
CALCIUM SERPL-MCNC: 10.5 MG/DL
CHLORIDE SERPL-SCNC: 102 MMOL/L
CO2 SERPL-SCNC: 24 MMOL/L
CREAT SERPL-MCNC: 0.83 MG/DL
EGFR: 117 ML/MIN/1.73M2
EOSINOPHIL # BLD AUTO: 0.37 K/UL — SIGNIFICANT CHANGE UP (ref 0–0.5)
EOSINOPHIL NFR BLD AUTO: 5.4 % — SIGNIFICANT CHANGE UP (ref 0–6)
GLUCOSE SERPL-MCNC: 119 MG/DL
HCT VFR BLD CALC: 34.8 % — LOW (ref 39–50)
HGB BLD-MCNC: 12.1 G/DL — LOW (ref 13–17)
IMM GRANULOCYTES NFR BLD AUTO: 4.5 % — HIGH (ref 0–0.9)
LDH SERPL-CCNC: 291 U/L
LYMPHOCYTES # BLD AUTO: 0.44 K/UL — LOW (ref 1–3.3)
LYMPHOCYTES # BLD AUTO: 6.4 % — LOW (ref 13–44)
MAGNESIUM SERPL-MCNC: 1.7 MG/DL
MCHC RBC-ENTMCNC: 31.9 PG — SIGNIFICANT CHANGE UP (ref 27–34)
MCHC RBC-ENTMCNC: 34.8 G/DL — SIGNIFICANT CHANGE UP (ref 32–36)
MCV RBC AUTO: 91.8 FL — SIGNIFICANT CHANGE UP (ref 80–100)
MONOCYTES # BLD AUTO: 1.48 K/UL — HIGH (ref 0–0.9)
MONOCYTES NFR BLD AUTO: 21.4 % — HIGH (ref 2–14)
NEUTROPHILS # BLD AUTO: 4.23 K/UL — SIGNIFICANT CHANGE UP (ref 1.8–7.4)
NEUTROPHILS NFR BLD AUTO: 61.3 % — SIGNIFICANT CHANGE UP (ref 43–77)
NRBC # BLD: 0 /100 WBCS — SIGNIFICANT CHANGE UP (ref 0–0)
PLATELET # BLD AUTO: 215 K/UL — SIGNIFICANT CHANGE UP (ref 150–400)
POTASSIUM SERPL-SCNC: 4.9 MMOL/L
PROT SERPL-MCNC: 7.6 G/DL
RBC # BLD: 3.79 M/UL — LOW (ref 4.2–5.8)
RBC # FLD: SIGNIFICANT CHANGE UP (ref 10.3–14.5)
SODIUM SERPL-SCNC: 141 MMOL/L
WBC # BLD: 6.9 K/UL — SIGNIFICANT CHANGE UP (ref 3.8–10.5)
WBC # FLD AUTO: 6.9 K/UL — SIGNIFICANT CHANGE UP (ref 3.8–10.5)

## 2023-07-19 PROCEDURE — 99215 OFFICE O/P EST HI 40 MIN: CPT

## 2023-07-20 LAB
CMV DNA SPEC QL NAA+PROBE: 63 IU/ML
CMVPCR LOG: 1.8 LOG10IU/ML
TACROLIMUS SERPL-MCNC: 5.2 NG/ML

## 2023-07-26 NOTE — PHYSICAL EXAM
[Restricted in physically strenuous activity but ambulatory and able to carry out work of a light or sedentary nature] : Status 1- Restricted in physically strenuous activity but ambulatory and able to carry out work of a light or sedentary nature, e.g., light house work, office work [Normal] : affect appropriate [Yes] : Yes [Cellcept] : Cellcept [] :  [No active (erythematous_ GVHD rash)] : Skin: No active (erythematous_ GVHD rash) [< 2 mg/dl] : Liver: < 2 mg/dl [No or intermittent] : Upper GI: No or intermittent nausea, vomiting or anorexia [<500 ml/day or < 3 episodes/day] : Lower GI (stool output/day): <500 ml/day or <3 episodes/day [No] : No [de-identified] :  Right index finger hyperpigmented [FreeTextEntry1] : 06/08/2023

## 2023-07-26 NOTE — HISTORY OF PRESENT ILLNESS
[de-identified] : 36 y/o M with no significant PMHx diagnosed with very severe aplastic anemia in April 2020. H confirmed on bone marrow biopsy at [facility] and now s/p inpatient treatment beginning 4/23/2020 with horse ATG + CsA + promacta with complete remission. Bone marrow bx on 11/9/20 revealed hypocellular marrow, overall cellularity improved (20-50%) showing marrow regeneration. BMBx on 5/18/2021 showing normal morphology with normal cellularity. Unfortunately patient relapsed in November 2021 with worsening cytopenias and neutropenic fever. He was admitted for treatment with rabbit ATG, prednisone, cyclosporine and Promacta with partial response  starting 12/27/21. 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 He reports doing well with ravulizumab and is able to continue working full time.\par He denies bleeding, bruising, fever, chills, night sweats, appetite changes.\par \par He presents today with his partner for follow up. He reports feeling okay, he continues to work full time. His energy, activity, and appetite are good. He however does feel fatigued when his Hb is low.  We discussed the plans for transplant. We will likely used his haplo sister as a donor and are completing work up for the [facility] as back up. He is completing his pre-testing in the coming weeks. He would like to proceed with transplant at the end of April to the beginning of May.\par \par \par PMHX:\par Asthma\par \par Allergy:\par NKDA\par \par SocialHx:\par Lives in  with partner and her family\par No children\par Manages oil terminal \par Denies smoking, ETOH use, illicit drug use\par \par \par FamilyHx:\par Father – unknown\par Mother – HTN\par Siblings – no medical history\par \par Dentist: needs to go\par  [de-identified] : This is a 37 year old male with severe aplastic anemia admitted for a haplo-identical bone marrow from his sister (6/12) with Flu / Cy / TBI prep regimen. Hematologic history as follows: initially diagnosed by bone marrow biopsy 4/20, began treatment 4/23/20 with horse ATG + CSA + promacta and achieved CR1. A bone marrow biopsy 11/9/20 showed hypocellular marrow, with overall improved cellularity showing marrow regeneration. Bone marrow biopsy 5/18/21 showed normal morphology and normal cellularity. He relapsed 11/2021, developing worsening cytopenias and neutropenic fevers. He was then treated with rabbit ATG, prednisone, CSA and promacta with partial response 12/27/21. \par He later developed worsening hemolysis and pancytopenia, with a rising PNH clone and was treated with Ravulizumab. \par \par Upon admission, a TLC was placed in IR. Mr. Puckett received IV hydration, pain management, nutritional support, and antibacterial / antiviral / antifungal / VOD / GI and PCP prophylaxis. Labs were monitored on a daily basis, and he received transfusional support and electrolyte repletion as needed. While admitted, Mr. Puckett experienced pancytopenia related to the high dose chemotherapy prep regimen. When he became neutropenic, he was started on prophylactic ciprofloxacin. He also had neutropenic fevers. When he became febrile, blood and urine cultures were sent, a CXR completed the ciprofloxacin was changed to cefepime. \par \par On 6/8/23, after pre-medication Mr. Puckett received 548 mL of allogeneic, related, fresh, pooled, plasma reduced, haplo-identical HPC marrow for over approximately 90 min. Cell counts as follows \par Total MNC( x10^8/kg)=21.08 \par CD34+cells ( x10^6 kg)=1.65 \par CD3+cells( x10^7)=2.94 \par Cell Viability (%)= 99 \par During the infusion Mr. Puckett developed hives and required additional dose \par of benadryl and hydrocortisone injection. \par \par Post transplant, on 6/12/23 he experienced a fever attributed to CRS post transplant. He was treated with a dose of tocilizumab. On 6/20/23, he developed clinical sinusitis, the cefepime was changed to zosyn. He completed a 10 day course. Pansinusitis was confirmed on a CT maxillofacial / orbit. Opthalmology was consulted on 6/21/23 for eye pain. Mr. Puckett also experienced chemotherapy induced diarrhea. C. diff was negative on 6/11/23. \par \par Engraftment was noted on 6/26/23. The daily zarxio was discontinued on 6/30/23, as was the zosyn. Post engraftment a FISH for Y was sent to determine chimerism, results are pending. A CMV PCR was sent as well. Currently, Mr. Puckett is stable for discharge home with outpatient follow up at the [facility]. \par \par On 7/5/23 visit, day + 27 post Haplo identical bone marrow transplant from his sister on 6/8/23. Overall fatigued but, feels well overall. Felt bumps on bilateral hands over the weekend. Right index finger hyperpigmented. On Cellcept 1000 mg TID and FK 4.5 mg BID. Did not take FK prior to blood work today. Denies fever, chills, nausea, vomiting, diarrhea, rash, mouth sores, dysuria or any signs of active bleeding. Denies SOB, chest pain or B/L LE edema. Remains compliant with post transplant medications. Remains compliant with post transplant diet and crowd restrictions. \par \par On 7/19/23 visit, day + 46 post Allogeneic PBSCT. On Cellcept 1000 mg QD and FK 4.5 mg BID. Overall well with no acute concerns. Complains of fatigue and pain behind the left eye. Denies any vision changes. Denies fever, chills, nausea, vomiting, diarrhea, rash, mouth sores, dysuria or any signs of active bleeding. Denies SOB, chest pain or B/L LE edema. Remains compliant with post transplant medications. Remains compliant with post transplant diet and crowd restrictions.

## 2023-07-26 NOTE — ASSESSMENT
[FreeTextEntry1] : This is a 37 year old male with severe aplastic anemia admitted for a haplo-identical bone marrow from his sister (6/12) with Flu / Cy / TBI prep regimen. Hematologic history as follows: initially diagnosed by bone marrow \par biopsy 4/20, began treatment 4/23/20 with horse ATG + CSA + promacta and achieved CR1. A bone marrow biopsy 11/9/20 showed hypocellular marrow, with overall improved cellularity showing marrow regeneration. Bone marrow biopsy 5/18/21 showed normal morphology and normal cellularity. He relapsed 11/2021, developing worsening cytopenias and neutropenic fevers. He was then treated with rabbit ATG, prednisone, CSA and promacta with partial response 12/27/21. He later developed worsening hemolysis and pancytopenia, with a rising PNH clone and was treated with Ravulizumab. Status post Haplo identical bone marrow transplant from his sister on 6/8/23.\par \par 1) Severe aplastic anemia.\par -Status post Haplo identical bone marrow transplant from his sister on 6/8/23.\par -7/3/23: FISH for Y= 96% donor\par \par 2) Heme\par Counts stable. No indication for transfusions today.\par Continue folic acid 1 mg oral tablet: 1 tab(s) orally once a day and Multiple Vitamins oral tablet: 1 tab(s) orally once a day.\par \par 3) ID\par Continue ppx:\par acyclovir 400 mg oral tablet: 1 tab(s) orally every 12 hours \par atovaquone 750 mg/5 mL oral suspension: 5 milliliter(s) orally 2 times a day \par fluconazole 200 mg oral tablet: 2 tab(s) orally once a day- HOLD as of  7/10/23.\par letermovir 480 mg oral tablet: 1 tab(s) orally once a day.\par \par GVHD\par Skin 0 Liver 0 GI 0- overall grade 0\par On FK 4.5 mg BID- pending today's level.\par On Cellcept to 1000 mg BID. On 7/15/23, decrease cellcept to 1000 mg daily. d/c in 48 hrs\par Reviewed signs and symptoms of GVHD.\par \par 7/5/23 CMV PCR negative. Continue to monitor weekly. \par \par 4) GI\par Continue ppx:\par ondansetron 8 mg oral tablet: 1 tab(s) orally every 8 hours as needed for nausea \par Pepcid 20 mg oral tablet: 1 tab(s) orally 2 times a day \par \par Transaminitis:\par 7/3/23: AST 47, , Alk Phos 115. Continue to monitor\par HOLD fluconazole as of 7/10/23.\par \par 5) Other\par Continue:\par magnesium oxide 400 mg oral tablet: 1 tab(s) orally 3 times a day (with meals) \par ursodiol 300 mg oral capsule: 1 cap(s) orally 2 times a day \par Eye Pain- Continue to monitor. If symptoms persist will order sinus xray.\par \par 6) Plan\par Continue weekly appointments at this time.\par Continue post transplant diet and crowd restrictions.\par NO RESTAURANT OR TAKE OUT FOOD AT THIS TIME, ONLY HOME COOKED PREPARED/FROZEN FOODS. You are allowed to have fresh baked pizza right out of the oven. This is the ONLY takeout food at this time. \par Notify your physician if bleeding; swelling; persistent nausea and vomiting; unable to urinate; pain not relieved by medications; fever; numbness, tingling; excessive diarrhea, inability to tolerate liquids or foods; increased \par irritability or sluggishness, rash \par Follow up  in one week\par

## 2023-07-26 NOTE — REVIEW OF SYSTEMS
[Fatigue] : fatigue [Eye Pain] : eye pain [Negative] : Allergic/Immunologic [Fever] : no fever [Chills] : no chills [Night Sweats] : no night sweats [Recent Change In Weight] : ~T no recent weight change [Red Eyes] : eyes not red [Dry Eyes] : no dryness of the eyes [Vision Problems] : no vision problems [Skin Rash] : no skin rash [Skin Wound] : no skin wound [FreeTextEntry3] : Pain behind left eye [de-identified] :  Right index finger hyperpigmented

## 2023-07-27 ENCOUNTER — APPOINTMENT (OUTPATIENT)
Dept: HEMATOLOGY ONCOLOGY | Facility: CLINIC | Age: 38
End: 2023-07-27
Payer: COMMERCIAL

## 2023-07-27 ENCOUNTER — RESULT REVIEW (OUTPATIENT)
Age: 38
End: 2023-07-27

## 2023-07-27 VITALS
BODY MASS INDEX: 31.96 KG/M2 | TEMPERATURE: 96.8 F | WEIGHT: 209.88 LBS | OXYGEN SATURATION: 98 % | DIASTOLIC BLOOD PRESSURE: 83 MMHG | RESPIRATION RATE: 15 BRPM | HEART RATE: 94 BPM | SYSTOLIC BLOOD PRESSURE: 120 MMHG

## 2023-07-27 LAB
ALBUMIN SERPL ELPH-MCNC: 4.9 G/DL
ALP BLD-CCNC: 106 U/L
ALT SERPL-CCNC: 114 U/L
ANION GAP SERPL CALC-SCNC: 13 MMOL/L
AST SERPL-CCNC: 41 U/L
BASOPHILS # BLD AUTO: 0.05 K/UL — SIGNIFICANT CHANGE UP (ref 0–0.2)
BASOPHILS NFR BLD AUTO: 0.5 % — SIGNIFICANT CHANGE UP (ref 0–2)
BILIRUB SERPL-MCNC: 0.3 MG/DL
BUN SERPL-MCNC: 15 MG/DL
CALCIUM SERPL-MCNC: 10.1 MG/DL
CHLORIDE SERPL-SCNC: 103 MMOL/L
CO2 SERPL-SCNC: 24 MMOL/L
CREAT SERPL-MCNC: 0.84 MG/DL
EGFR: 117 ML/MIN/1.73M2
EOSINOPHIL # BLD AUTO: 0.23 K/UL — SIGNIFICANT CHANGE UP (ref 0–0.5)
EOSINOPHIL NFR BLD AUTO: 2.4 % — SIGNIFICANT CHANGE UP (ref 0–6)
GLUCOSE SERPL-MCNC: 116 MG/DL
HCT VFR BLD CALC: 34.1 % — LOW (ref 39–50)
HGB BLD-MCNC: 12.1 G/DL — LOW (ref 13–17)
IMM GRANULOCYTES NFR BLD AUTO: 1.1 % — HIGH (ref 0–0.9)
LDH SERPL-CCNC: 251 U/L
LYMPHOCYTES # BLD AUTO: 1.15 K/UL — SIGNIFICANT CHANGE UP (ref 1–3.3)
LYMPHOCYTES # BLD AUTO: 12.2 % — LOW (ref 13–44)
MAGNESIUM SERPL-MCNC: 1.6 MG/DL
MCHC RBC-ENTMCNC: 32.5 PG — SIGNIFICANT CHANGE UP (ref 27–34)
MCHC RBC-ENTMCNC: 35.5 G/DL — SIGNIFICANT CHANGE UP (ref 32–36)
MCV RBC AUTO: 91.7 FL — SIGNIFICANT CHANGE UP (ref 80–100)
MONOCYTES # BLD AUTO: 1.08 K/UL — HIGH (ref 0–0.9)
MONOCYTES NFR BLD AUTO: 11.5 % — SIGNIFICANT CHANGE UP (ref 2–14)
NEUTROPHILS # BLD AUTO: 6.82 K/UL — SIGNIFICANT CHANGE UP (ref 1.8–7.4)
NEUTROPHILS NFR BLD AUTO: 72.3 % — SIGNIFICANT CHANGE UP (ref 43–77)
NRBC # BLD: 0 /100 WBCS — SIGNIFICANT CHANGE UP (ref 0–0)
PLATELET # BLD AUTO: 233 K/UL — SIGNIFICANT CHANGE UP (ref 150–400)
POTASSIUM SERPL-SCNC: 4.2 MMOL/L
PROT SERPL-MCNC: 7.6 G/DL
RBC # BLD: 3.72 M/UL — LOW (ref 4.2–5.8)
RBC # FLD: SIGNIFICANT CHANGE UP (ref 10.3–14.5)
SODIUM SERPL-SCNC: 140 MMOL/L
TACROLIMUS SERPL-MCNC: 4.1 NG/ML
WBC # BLD: 9.43 K/UL — SIGNIFICANT CHANGE UP (ref 3.8–10.5)
WBC # FLD AUTO: 9.43 K/UL — SIGNIFICANT CHANGE UP (ref 3.8–10.5)

## 2023-07-27 PROCEDURE — 99215 OFFICE O/P EST HI 40 MIN: CPT

## 2023-07-27 NOTE — ASSESSMENT
[FreeTextEntry1] : This is a 37 year old male with severe aplastic anemia admitted for a haplo-identical bone marrow from his sister (6/12) with Flu / Cy / TBI prep regimen. Hematologic history as follows: initially diagnosed by bone marrow \par biopsy 4/20, began treatment 4/23/20 with horse ATG + CSA + promacta and achieved CR1. A bone marrow biopsy 11/9/20 showed hypocellular marrow, with overall improved cellularity showing marrow regeneration. Bone marrow biopsy 5/18/21 showed normal morphology and normal cellularity. He relapsed 11/2021, developing worsening cytopenias and neutropenic fevers. He was then treated with rabbit ATG, prednisone, CSA and promacta with partial response 12/27/21. He later developed worsening hemolysis and pancytopenia, with a rising PNH clone and was treated with Ravulizumab. Status post Haplo identical bone marrow transplant from his sister on 6/8/23.\par \par 1) Severe aplastic anemia.\par -Status post Haplo identical bone marrow transplant from his sister on 6/8/23.\par -7/3/23: FISH for Y= 96% donor\par  7/10/23: FISH for Y = 99.5% donor\par \par 2) Heme\par Counts stable. No indication for transfusions today.\par Continue folic acid 1 mg oral tablet: 1 tab(s) orally once a day and Multiple Vitamins oral tablet: 1 tab(s) orally once a day.\par \par 3) ID\par Continue ppx:\par Acyclovir 400 mg oral tablet: 1 tab(s) orally every 12 hours \par Atovaquone 750 mg/5 mL oral suspension: 5 milliliter(s) orally 2 times a day \par Fluconazole 200 mg oral tablet: 2 tab(s) orally once a day- HOLD as of  7/10/23.\par Letermovir 480 mg oral tablet: 1 tab(s) orally once a day.\par \par GVHD\par Skin 0 Liver 0 GI 0- overall grade 0\par On FK 4.5 mg BID- pending today's level.\par On Cellcept to 1000 mg BID. On 7/15/23, decrease cellcept to 1000 mg daily.OFF SINCE LAST WEEK (Week of  7/17/23)\par Reviewed signs and symptoms of GVHD.\par \par 7/5/23 CMV PCR negative. Continue to monitor weekly. \par 7/19/23:CMV PCR negative\par \par 4) GI\par Continue ppx:\par Ondansetron 8 mg oral tablet: 1 tab(s) orally every 8 hours as needed for nausea \par Pepcid 20 mg oral tablet: 1 tab(s) orally 2 times a day \par \par Transaminitis:\par 7/3/23: AST 47, , Alk Phos 115. Continue to monitor\par HOLD fluconazole as of 7/10/23\par 7/27/23: AST 41  AlkPhos 106 .....continue to HOLD Fluconazole\par \par 5) Other\par Continue:\par Magnesium oxide 400 mg oral tablet: 1 tab(s) orally 3 times a day (with meals) \par Ursodiol 300 mg oral capsule: 1 cap(s) orally 2 times a day \par Eye Pain- Continue to monitor. If symptoms persist will order sinus xray....Resolved\par \par 6) Plan\par Continue weekly appointments at this time.\par Continue post transplant diet and crowd restrictions.\par NO RESTAURANT OR TAKE OUT FOOD AT THIS TIME, ONLY HOME COOKED PREPARED/FROZEN FOODS. You are allowed to have fresh baked pizza right out of the oven. This is the ONLY takeout food at this time. \par Notify your physician if bleeding; swelling; persistent nausea and vomiting; unable to urinate; pain not relieved by medications; fever; numbness, tingling; excessive diarrhea, inability to tolerate liquids or foods; increased \par irritability or sluggishness, rash \par Follow up  in one week\par \par I examined patient under Dr. Wilder's supervision and Dr. Wilder agrees to plan of care as listed above\par

## 2023-07-27 NOTE — REVIEW OF SYSTEMS
[Fatigue] : fatigue [Negative] : Eyes [Fever] : no fever [Chills] : no chills [Night Sweats] : no night sweats [Recent Change In Weight] : ~T no recent weight change [Eye Pain] : no eye pain [Red Eyes] : eyes not red [Dry Eyes] : no dryness of the eyes [Vision Problems] : no vision problems [Skin Rash] : no skin rash [Skin Wound] : no skin wound [de-identified] :  Right index finger hyperpigmented

## 2023-07-27 NOTE — PHYSICAL EXAM
[Restricted in physically strenuous activity but ambulatory and able to carry out work of a light or sedentary nature] : Status 1- Restricted in physically strenuous activity but ambulatory and able to carry out work of a light or sedentary nature, e.g., light house work, office work [Yes] : Yes [Cellcept] : Cellcept [] :  [No active (erythematous_ GVHD rash)] : Skin: No active (erythematous_ GVHD rash) [< 2 mg/dl] : Liver: < 2 mg/dl [No or intermittent] : Upper GI: No or intermittent nausea, vomiting or anorexia [<500 ml/day or < 3 episodes/day] : Lower GI (stool output/day): <500 ml/day or <3 episodes/day [No] : No [Normal] : no JVD, no calf tenderness, venous stasis changes, varices [de-identified] :  Right index finger hyperpigmented [FreeTextEntry1] : 06/08/2023

## 2023-07-27 NOTE — HISTORY OF PRESENT ILLNESS
[de-identified] : 38 y/o M with no significant PMHx diagnosed with very severe aplastic anemia in April 2020. H confirmed on bone marrow biopsy at [facility] and now s/p inpatient treatment beginning 4/23/2020 with horse ATG + CsA + promacta with complete remission. Bone marrow bx on 11/9/20 revealed hypocellular marrow, overall cellularity improved (20-50%) showing marrow regeneration. BMBx on 5/18/2021 showing normal morphology with normal cellularity. Unfortunately patient relapsed in November 2021 with worsening cytopenias and neutropenic fever. He was admitted for treatment with rabbit ATG, prednisone, cyclosporine and Promacta with partial response  starting 12/27/21. 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 He reports doing well with ravulizumab and is able to continue working full time.\par He denies bleeding, bruising, fever, chills, night sweats, appetite changes.\par \par He presents today with his partner for follow up. He reports feeling okay, he continues to work full time. His energy, activity, and appetite are good. He however does feel fatigued when his Hb is low.  We discussed the plans for transplant. We will likely used his haplo sister as a donor and are completing work up for the [facility] as back up. He is completing his pre-testing in the coming weeks. He would like to proceed with transplant at the end of April to the beginning of May.\par \par \par PMHX:\par Asthma\par \par Allergy:\par NKDA\par \par SocialHx:\par Lives in  with partner and her family\par No children\par Manages oil terminal \par Denies smoking, ETOH use, illicit drug use\par \par \par FamilyHx:\par Father – unknown\par Mother – HTN\par Siblings – no medical history\par \par Dentist: needs to go\par  [de-identified] : This is a 37 year old male with severe aplastic anemia admitted for a haplo-identical bone marrow from his sister (6/12) with Flu / Cy / TBI prep regimen. Hematologic history as follows: initially diagnosed by bone marrow biopsy 4/20, began treatment 4/23/20 with horse ATG + CSA + promacta and achieved CR1. A bone marrow biopsy 11/9/20 showed hypocellular marrow, with overall improved cellularity showing marrow regeneration. Bone marrow biopsy 5/18/21 showed normal morphology and normal cellularity. He relapsed 11/2021, developing worsening cytopenias and neutropenic fevers. He was then treated with rabbit ATG, prednisone, CSA and promacta with partial response 12/27/21. \par He later developed worsening hemolysis and pancytopenia, with a rising PNH clone and was treated with Ravulizumab. \par \par Upon admission, a TLC was placed in IR. Mr. Puckett received IV hydration, pain management, nutritional support, and antibacterial / antiviral / antifungal / VOD / GI and PCP prophylaxis. Labs were monitored on a daily basis, and he received transfusional support and electrolyte repletion as needed. While admitted, Mr. Puckett experienced pancytopenia related to the high dose chemotherapy prep regimen. When he became neutropenic, he was started on prophylactic ciprofloxacin. He also had neutropenic fevers. When he became febrile, blood and urine cultures were sent, a CXR completed the ciprofloxacin was changed to cefepime. \par \par On 6/8/23, after pre-medication Mr. Puckett received 548 mL of allogeneic, related, fresh, pooled, plasma reduced, haplo-identical HPC marrow for over approximately 90 min. Cell counts as follows \par Total MNC( x10^8/kg)=21.08 \par CD34+cells ( x10^6 kg)=1.65 \par CD3+cells( x10^7)=2.94 \par Cell Viability (%)= 99 \par During the infusion Mr. Puckett developed hives and required additional dose \par of benadryl and hydrocortisone injection. \par \par Post transplant, on 6/12/23 he experienced a fever attributed to CRS post transplant. He was treated with a dose of tocilizumab. On 6/20/23, he developed clinical sinusitis, the cefepime was changed to zosyn. He completed a 10 day course. Pansinusitis was confirmed on a CT maxillofacial / orbit. Opthalmology was consulted on 6/21/23 for eye pain. Mr. Puckett also experienced chemotherapy induced diarrhea. C. diff was negative on 6/11/23. \par \par Engraftment was noted on 6/26/23. The daily zarxio was discontinued on 6/30/23, as was the zosyn. Post engraftment a FISH for Y was sent to determine chimerism, results are pending. A CMV PCR was sent as well. Currently, Mr. Puckett is stable for discharge home with outpatient follow up at the [facility]. \par \par On 7/5/23 visit, day + 27 post Haplo identical bone marrow transplant from his sister on 6/8/23. Overall fatigued but, feels well overall. Felt bumps on bilateral hands over the weekend. Right index finger hyperpigmented. On Cellcept 1000 mg TID and FK 4.5 mg BID. Did not take FK prior to blood work today. Denies fever, chills, nausea, vomiting, diarrhea, rash, mouth sores, dysuria or any signs of active bleeding. Denies SOB, chest pain or B/L LE edema. Remains compliant with post transplant medications. Remains compliant with post transplant diet and crowd restrictions. \par \par On 7/19/23 visit, day + 46 post Allogeneic PBSCT. On Cellcept 1000 mg QD and FK 4.5 mg BID. Overall well with no acute concerns. Complains of fatigue and pain behind the left eye. Denies any vision changes. Denies fever, chills, nausea, vomiting, diarrhea, rash, mouth sores, dysuria or any signs of active bleeding. Denies SOB, chest pain or B/L LE edema. Remains compliant with post transplant medications. Remains compliant with post transplant diet and crowd restrictions. \par \par 7/27/23:Today is Day + 49 post Allogeneic PBSCT.Denies fever, chills, nausea,vomiting, diarrhea, SOB,chest pain,edema, rash,mouth sores,dysuria or any signs of active bleeding.On FK 4.5mg 2 x day.He is off Cellcept since last week.Remains compliant with medications as prescribed. Remains compliant with diet and crowd restrictions.

## 2023-07-28 LAB
CMV DNA SPEC QL NAA+PROBE: ABNORMAL IU/ML
CMVPCR LOG: ABNORMAL LOG10IU/ML

## 2023-08-03 ENCOUNTER — RESULT REVIEW (OUTPATIENT)
Age: 38
End: 2023-08-03

## 2023-08-03 ENCOUNTER — APPOINTMENT (OUTPATIENT)
Dept: HEMATOLOGY ONCOLOGY | Facility: CLINIC | Age: 38
End: 2023-08-03
Payer: COMMERCIAL

## 2023-08-03 VITALS
TEMPERATURE: 96.7 F | SYSTOLIC BLOOD PRESSURE: 131 MMHG | HEART RATE: 84 BPM | BODY MASS INDEX: 32.46 KG/M2 | OXYGEN SATURATION: 99 % | DIASTOLIC BLOOD PRESSURE: 80 MMHG | RESPIRATION RATE: 16 BRPM | WEIGHT: 213.16 LBS

## 2023-08-03 LAB
BASOPHILS # BLD AUTO: 0.03 K/UL — SIGNIFICANT CHANGE UP (ref 0–0.2)
BASOPHILS NFR BLD AUTO: 0.5 % — SIGNIFICANT CHANGE UP (ref 0–2)
EOSINOPHIL # BLD AUTO: 0.28 K/UL — SIGNIFICANT CHANGE UP (ref 0–0.5)
EOSINOPHIL NFR BLD AUTO: 4.3 % — SIGNIFICANT CHANGE UP (ref 0–6)
HCT VFR BLD CALC: 34.6 % — LOW (ref 39–50)
HGB BLD-MCNC: 11.9 G/DL — LOW (ref 13–17)
IMM GRANULOCYTES NFR BLD AUTO: 0.5 % — SIGNIFICANT CHANGE UP (ref 0–0.9)
LYMPHOCYTES # BLD AUTO: 1.58 K/UL — SIGNIFICANT CHANGE UP (ref 1–3.3)
LYMPHOCYTES # BLD AUTO: 24.4 % — SIGNIFICANT CHANGE UP (ref 13–44)
MCHC RBC-ENTMCNC: 32.3 PG — SIGNIFICANT CHANGE UP (ref 27–34)
MCHC RBC-ENTMCNC: 34.4 G/DL — SIGNIFICANT CHANGE UP (ref 32–36)
MCV RBC AUTO: 94 FL — SIGNIFICANT CHANGE UP (ref 80–100)
MONOCYTES # BLD AUTO: 0.87 K/UL — SIGNIFICANT CHANGE UP (ref 0–0.9)
MONOCYTES NFR BLD AUTO: 13.4 % — SIGNIFICANT CHANGE UP (ref 2–14)
NEUTROPHILS # BLD AUTO: 3.68 K/UL — SIGNIFICANT CHANGE UP (ref 1.8–7.4)
NEUTROPHILS NFR BLD AUTO: 56.9 % — SIGNIFICANT CHANGE UP (ref 43–77)
NRBC # BLD: 0 /100 WBCS — SIGNIFICANT CHANGE UP (ref 0–0)
PLATELET # BLD AUTO: 209 K/UL — SIGNIFICANT CHANGE UP (ref 150–400)
RBC # BLD: 3.68 M/UL — LOW (ref 4.2–5.8)
RBC # FLD: SIGNIFICANT CHANGE UP (ref 10.3–14.5)
TACROLIMUS SERPL-MCNC: 4.6 NG/ML
WBC # BLD: 6.47 K/UL — SIGNIFICANT CHANGE UP (ref 3.8–10.5)
WBC # FLD AUTO: 6.47 K/UL — SIGNIFICANT CHANGE UP (ref 3.8–10.5)

## 2023-08-03 PROCEDURE — 99215 OFFICE O/P EST HI 40 MIN: CPT

## 2023-08-03 NOTE — PHYSICAL EXAM
[Restricted in physically strenuous activity but ambulatory and able to carry out work of a light or sedentary nature] : Status 1- Restricted in physically strenuous activity but ambulatory and able to carry out work of a light or sedentary nature, e.g., light house work, office work [Normal] : affect appropriate [de-identified] :  skin hyperpigmented..few acne like lesions on face [Yes] : Yes [Cellcept] : Cellcept [] :  [No active (erythematous_ GVHD rash)] : Skin: No active (erythematous_ GVHD rash) [< 2 mg/dl] : Liver: < 2 mg/dl [No or intermittent] : Upper GI: No or intermittent nausea, vomiting or anorexia [<500 ml/day or < 3 episodes/day] : Lower GI (stool output/day): <500 ml/day or <3 episodes/day [No] : No [FreeTextEntry1] : 06/08/2023

## 2023-08-03 NOTE — HISTORY OF PRESENT ILLNESS
[de-identified] : 36 y/o M with no significant PMHx diagnosed with very severe aplastic anemia in April 2020. H confirmed on bone marrow biopsy at [facility] and now s/p inpatient treatment beginning 4/23/2020 with horse ATG + CsA + promacta with complete remission. Bone marrow bx on 11/9/20 revealed hypocellular marrow, overall cellularity improved (20-50%) showing marrow regeneration. BMBx on 5/18/2021 showing normal morphology with normal cellularity. Unfortunately patient relapsed in November 2021 with worsening cytopenias and neutropenic fever. He was admitted for treatment with rabbit ATG, prednisone, cyclosporine and Promacta with partial response  starting 12/27/21. 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 He reports doing well with ravulizumab and is able to continue working full time.\par  He denies bleeding, bruising, fever, chills, night sweats, appetite changes.\par  \par  He presents today with his partner for follow up. He reports feeling okay, he continues to work full time. His energy, activity, and appetite are good. He however does feel fatigued when his Hb is low.  We discussed the plans for transplant. We will likely used his haplo sister as a donor and are completing work up for the [facility] as back up. He is completing his pre-testing in the coming weeks. He would like to proceed with transplant at the end of April to the beginning of May.\par  \par  \par  PMHX:\par  Asthma\par  \par  Allergy:\par  NKDA\par  \par  SocialHx:\par  Lives in  with partner and her family\par  No children\par  Manages oil terminal \par  Denies smoking, ETOH use, illicit drug use\par  \par  \par  FamilyHx:\par  Father - unknown\par  Mother - HTN\par  Siblings - no medical history\par  \par  Dentist: needs to go\par   [de-identified] : This is a 37 year old male with severe aplastic anemia admitted for a haplo-identical bone marrow from his sister (6/12) with Flu / Cy / TBI prep regimen. Hematologic history as follows: initially diagnosed by bone marrow biopsy 4/20, began treatment 4/23/20 with horse ATG + CSA + promacta and achieved CR1. A bone marrow biopsy 11/9/20 showed hypocellular marrow, with overall improved cellularity showing marrow regeneration. Bone marrow biopsy 5/18/21 showed normal morphology and normal cellularity. He relapsed 11/2021, developing worsening cytopenias and neutropenic fevers. He was then treated with rabbit ATG, prednisone, CSA and promacta with partial response 12/27/21.  He later developed worsening hemolysis and pancytopenia, with a rising PNH clone and was treated with Ravulizumab.   Upon admission, a TLC was placed in IR. Mr. Puckett received IV hydration, pain management, nutritional support, and antibacterial / antiviral / antifungal / VOD / GI and PCP prophylaxis. Labs were monitored on a daily basis, and he received transfusional support and electrolyte repletion as needed. While admitted, Mr. Puckett experienced pancytopenia related to the high dose chemotherapy prep regimen. When he became neutropenic, he was started on prophylactic ciprofloxacin. He also had neutropenic fevers. When he became febrile, blood and urine cultures were sent, a CXR completed the ciprofloxacin was changed to cefepime.   On 6/8/23, after pre-medication Mr. Puckett received 548 mL of allogeneic, related, fresh, pooled, plasma reduced, haplo-identical HPC marrow for over approximately 90 min. Cell counts as follows  Total MNC( x10^8/kg)=21.08  CD34+cells ( x10^6 kg)=1.65  CD3+cells( x10^7)=2.94  Cell Viability (%)= 99  During the infusion Mr. Puckett developed hives and required additional dose  of benadryl and hydrocortisone injection.   Post transplant, on 6/12/23 he experienced a fever attributed to CRS post transplant. He was treated with a dose of tocilizumab. On 6/20/23, he developed clinical sinusitis, the cefepime was changed to zosyn. He completed a 10 day course. Pansinusitis was confirmed on a CT maxillofacial / orbit. Opthalmology was consulted on 6/21/23 for eye pain. Mr. Puckett also experienced chemotherapy induced diarrhea. C. diff was negative on 6/11/23.   Engraftment was noted on 6/26/23. The daily zarxio was discontinued on 6/30/23, as was the zosyn. Post engraftment a FISH for Y was sent to determine chimerism, results are pending. A CMV PCR was sent as well. Currently, Mr. Puckett is stable for discharge home with outpatient follow up at the [facility].   On 7/5/23 visit, day + 27 post Haplo identical bone marrow transplant from his sister on 6/8/23. Overall fatigued but, feels well overall. Felt bumps on bilateral hands over the weekend. Right index finger hyperpigmented. On Cellcept 1000 mg TID and FK 4.5 mg BID. Did not take FK prior to blood work today. Denies fever, chills, nausea, vomiting, diarrhea, rash, mouth sores, dysuria or any signs of active bleeding. Denies SOB, chest pain or B/L LE edema. Remains compliant with post transplant medications. Remains compliant with post transplant diet and crowd restrictions.   On 7/19/23 visit, day + 46 post Allogeneic PBSCT. On Cellcept 1000 mg QD and FK 4.5 mg BID. Overall well with no acute concerns. Complains of fatigue and pain behind the left eye. Denies any vision changes. Denies fever, chills, nausea, vomiting, diarrhea, rash, mouth sores, dysuria or any signs of active bleeding. Denies SOB, chest pain or B/L LE edema. Remains compliant with post transplant medications. Remains compliant with post transplant diet and crowd restrictions.   8/3/23:Today is Day + 60 post Allogeneic PBSCT.Denies fever, chills, nausea,vomiting, diarrhea, SOB,chest pain,edema, rash,mouth sores,dysuria or any signs of active bleeding.On FK 4.5mg 2 x day.He is off Cellcept since last week.Remains compliant with medications as prescribed. Remains compliant with diet and crowd restrictions. Describes occ double vision

## 2023-08-03 NOTE — ASSESSMENT
[FreeTextEntry1] : This is a 37 year old male with severe aplastic anemia admitted for a haplo-identical bone marrow from his sister (6/12) with Flu / Cy / TBI prep regimen. Hematologic history as follows: initially diagnosed by bone marrow  biopsy 4/20, began treatment 4/23/20 with horse ATG + CSA + promacta and achieved CR1. A bone marrow biopsy 11/9/20 showed hypocellular marrow, with overall improved cellularity showing marrow regeneration. Bone marrow biopsy 5/18/21 showed normal morphology and normal cellularity. He relapsed 11/2021, developing worsening cytopenias and neutropenic fevers. He was then treated with rabbit ATG, prednisone, CSA and promacta with partial response 12/27/21. He later developed worsening hemolysis and pancytopenia, with a rising PNH clone and was treated with Ravulizumab. Status post Haplo identical bone marrow transplant from his sister on 6/8/23.  1) Severe aplastic anemia. -Status post Haplo identical bone marrow transplant from his sister on 6/8/23. -7/3/23: FISH for Y= 96% donor  7/10/23: FISH for Y = 99.5% donor  2) Heme Counts stable. No indication for transfusions today. Continue folic acid 1 mg oral tablet: 1 tab(s) orally once a day and Multiple Vitamins oral tablet: 1 tab(s) orally once a day.  3) ID Continue ppx: Acyclovir 400 mg oral tablet: 1 tab(s) orally every 12 hours  Atovaquone 750 mg/5 mL oral suspension: 5 milliliter(s) orally 2 times a day  Fluconazole 200 mg oral tablet: 2 tab(s) orally once a day- HOLD as of  7/10/23. Letermovir 480 mg oral tablet: 1 tab(s) orally once a day.  GVHD Skin 0 Liver 0 GI 0- overall grade 0 On FK 4.5 mg BID- given today's level increase to 5 mg On Cellcept to 1000 mg BID. On 7/15/23, decrease cellcept to 1000 mg daily.OFF SINCE LAST WEEK (Week of  7/17/23) Reviewed signs and symptoms of GVHD.  7/5/23 CMV PCR negative. Continue to monitor weekly.  7/19/23:CMV PCR negative  4) GI Continue ppx: Ondansetron 8 mg oral tablet: 1 tab(s) orally every 8 hours as needed for nausea  Pepcid 20 mg oral tablet: 1 tab(s) orally 2 times a day   Transaminitis: 7/3/23: AST 47, , Alk Phos 115. Continue to monitor HOLD fluconazole as of 7/10/23 7/27/23: AST 41  AlkPhos 106 .....continue to HOLD Fluconazole  5) Other Continue: Magnesium oxide 400 mg oral tablet: 1 tab(s) orally 3 times a day (with meals)  Ursodiol 300 mg oral capsule: 1 cap(s) orally 2 times a day  Eye Pain / occ double vision- Continue to monitor. head ct ordered  6) Plan Continue weekly appointments at this time. Continue post transplant diet and crowd restrictions. NO RESTAURANT OR TAKE OUT FOOD AT THIS TIME, ONLY HOME COOKED PREPARED/FROZEN FOODS. You are allowed to have fresh baked pizza right out of the oven. This is the ONLY takeout food at this time.  Notify your physician if bleeding; swelling; persistent nausea and vomiting; unable to urinate; pain not relieved by medications; fever; numbness, tingling; excessive diarrhea, inability to tolerate liquids or foods; increased  irritability or sluggishness, rash  Follow up  in one week bm bx day 120

## 2023-08-03 NOTE — REVIEW OF SYSTEMS
[Fever] : no fever [Chills] : no chills [Night Sweats] : no night sweats [Fatigue] : fatigue [Recent Change In Weight] : ~T no recent weight change [Eye Pain] : no eye pain [Red Eyes] : eyes not red [Dry Eyes] : no dryness of the eyes [Vision Problems] : no vision problems [Skin Rash] : no skin rash [Skin Wound] : no skin wound [Negative] : Allergic/Immunologic [de-identified] :  Right index finger hyperpigmented

## 2023-08-04 LAB
ALBUMIN SERPL ELPH-MCNC: 4.8 G/DL
ALP BLD-CCNC: 100 U/L
ALT SERPL-CCNC: 71 U/L
ANION GAP SERPL CALC-SCNC: 14 MMOL/L
AST SERPL-CCNC: 25 U/L
BILIRUB SERPL-MCNC: 0.3 MG/DL
BUN SERPL-MCNC: 17 MG/DL
CALCIUM SERPL-MCNC: 9.9 MG/DL
CHLORIDE SERPL-SCNC: 105 MMOL/L
CMV DNA SPEC QL NAA+PROBE: ABNORMAL IU/ML
CMVPCR LOG: ABNORMAL LOG10IU/ML
CO2 SERPL-SCNC: 20 MMOL/L
CREAT SERPL-MCNC: 0.83 MG/DL
EGFR: 117 ML/MIN/1.73M2
GLUCOSE SERPL-MCNC: 109 MG/DL
LDH SERPL-CCNC: 181 U/L
MAGNESIUM SERPL-MCNC: 1.6 MG/DL
POTASSIUM SERPL-SCNC: 4.3 MMOL/L
PROT SERPL-MCNC: 7 G/DL
SODIUM SERPL-SCNC: 139 MMOL/L

## 2023-08-11 ENCOUNTER — APPOINTMENT (OUTPATIENT)
Dept: HEMATOLOGY ONCOLOGY | Facility: CLINIC | Age: 38
End: 2023-08-11
Payer: COMMERCIAL

## 2023-08-11 ENCOUNTER — RESULT REVIEW (OUTPATIENT)
Age: 38
End: 2023-08-11

## 2023-08-11 VITALS
OXYGEN SATURATION: 99 % | HEART RATE: 87 BPM | WEIGHT: 211.42 LBS | TEMPERATURE: 96.4 F | DIASTOLIC BLOOD PRESSURE: 73 MMHG | BODY MASS INDEX: 32.19 KG/M2 | RESPIRATION RATE: 17 BRPM | SYSTOLIC BLOOD PRESSURE: 117 MMHG

## 2023-08-11 LAB
BASOPHILS # BLD AUTO: 0.03 K/UL — SIGNIFICANT CHANGE UP (ref 0–0.2)
BASOPHILS NFR BLD AUTO: 0.4 % — SIGNIFICANT CHANGE UP (ref 0–2)
EOSINOPHIL # BLD AUTO: 0.28 K/UL — SIGNIFICANT CHANGE UP (ref 0–0.5)
EOSINOPHIL NFR BLD AUTO: 4.1 % — SIGNIFICANT CHANGE UP (ref 0–6)
HCT VFR BLD CALC: 36.5 % — LOW (ref 39–50)
HGB BLD-MCNC: 12.8 G/DL — LOW (ref 13–17)
IMM GRANULOCYTES NFR BLD AUTO: 0.4 % — SIGNIFICANT CHANGE UP (ref 0–0.9)
LYMPHOCYTES # BLD AUTO: 1.68 K/UL — SIGNIFICANT CHANGE UP (ref 1–3.3)
LYMPHOCYTES # BLD AUTO: 24.5 % — SIGNIFICANT CHANGE UP (ref 13–44)
MCHC RBC-ENTMCNC: 32.5 PG — SIGNIFICANT CHANGE UP (ref 27–34)
MCHC RBC-ENTMCNC: 35.1 G/DL — SIGNIFICANT CHANGE UP (ref 32–36)
MCV RBC AUTO: 92.6 FL — SIGNIFICANT CHANGE UP (ref 80–100)
MONOCYTES # BLD AUTO: 0.83 K/UL — SIGNIFICANT CHANGE UP (ref 0–0.9)
MONOCYTES NFR BLD AUTO: 12.1 % — SIGNIFICANT CHANGE UP (ref 2–14)
NEUTROPHILS # BLD AUTO: 4.02 K/UL — SIGNIFICANT CHANGE UP (ref 1.8–7.4)
NEUTROPHILS NFR BLD AUTO: 58.5 % — SIGNIFICANT CHANGE UP (ref 43–77)
NRBC # BLD: 0 /100 WBCS — SIGNIFICANT CHANGE UP (ref 0–0)
PLATELET # BLD AUTO: 188 K/UL — SIGNIFICANT CHANGE UP (ref 150–400)
RBC # BLD: 3.94 M/UL — LOW (ref 4.2–5.8)
RBC # FLD: 17.6 % — HIGH (ref 10.3–14.5)
WBC # BLD: 6.87 K/UL — SIGNIFICANT CHANGE UP (ref 3.8–10.5)
WBC # FLD AUTO: 6.87 K/UL — SIGNIFICANT CHANGE UP (ref 3.8–10.5)

## 2023-08-11 PROCEDURE — 99215 OFFICE O/P EST HI 40 MIN: CPT

## 2023-08-11 NOTE — ASSESSMENT
[FreeTextEntry1] : This is a 37 year old male with severe aplastic anemia admitted for a haplo-identical bone marrow from his sister (6/12) with Flu / Cy / TBI prep regimen. Hematologic history as follows: initially diagnosed by bone marrow  biopsy 4/20, began treatment 4/23/20 with horse ATG + CSA + promacta and achieved CR1. A bone marrow biopsy 11/9/20 showed hypocellular marrow, with overall improved cellularity showing marrow regeneration. Bone marrow biopsy 5/18/21 showed normal morphology and normal cellularity. He relapsed 11/2021, developing worsening cytopenias and neutropenic fevers. He was then treated with rabbit ATG, prednisone, CSA and promacta with partial response 12/27/21. He later developed worsening hemolysis and pancytopenia, with a rising PNH clone and was treated with Ravulizumab. Status post Haplo identical bone marrow transplant from his sister on 6/8/23.  1) Severe aplastic anemia. -Status post Haplo identical bone marrow transplant from his sister on 6/8/23. -7/3/23: FISH for Y= 96% donor  7/10/23: FISH for Y = 99.5% donor 8/3/23: FISH for Y =100% donor  2) Heme Counts stable. No indication for transfusions today. Continue folic acid 1 mg oral tablet: 1 tab(s) orally once a day and Multiple Vitamins oral tablet: 1 tab(s) orally once a day.  3) ID Continue ppx: Acyclovir 400 mg oral tablet: 1 tab(s) orally every 12 hours  Atovaquone 750 mg/5 mL oral suspension: 5 milliliter(s) orally 2 times a day  Fluconazole 200 mg oral tablet: 2 tab(s) orally once a day- HOLD as of  7/10/23. Letermovir 480 mg oral tablet: 1 tab(s) orally once a day.  GVHD Skin 0 Liver 0 GI 0- overall grade 0 On FK 5 mg BID On Cellcept to 1000 mg BID. On 7/15/23, decrease cellcept to 1000 mg daily.OFF SINCE LAST WEEK (Week of  7/17/23) Reviewed signs and symptoms of GVHD.  7/5/23 CMV PCR negative. Continue to monitor weekly.  7/19/23:CMV PCR negative 8/3/23: CMV PCR-<34.5 IU/Ml  4) GI Continue ppx: Ondansetron 8 mg oral tablet: 1 tab(s) orally every 8 hours as needed for nausea  Pepcid 20 mg oral tablet: 1 tab(s) orally 2 times a day   Transaminitis: 7/3/23: AST 47, , Alk Phos 115. Continue to monitor HOLD fluconazole as of 7/10/23 7/27/23: AST 41  AlkPhos 106 .....continue to HOLD Fluconazole Continue to HOLD Fluconazole  5) Other Continue: Magnesium oxide 400 mg oral tablet: 1 tab(s) orally 3 times a day (with meals)  Ursodiol 300 mg oral capsule: 1 cap(s) orally 2 times a day  Eye Pain / occ double vision- Continue to monitor. head ct ordered-RESOLVED  6) Plan Continue weekly appointments at this time. Continue post transplant diet and crowd restrictions. NO RESTAURANT OR TAKE OUT FOOD AT THIS TIME, ONLY HOME COOKED PREPARED/FROZEN FOODS. You are allowed to have fresh baked pizza right out of the oven. This is the ONLY takeout food at this time.  Notify your physician if bleeding; swelling; persistent nausea and vomiting; unable to urinate; pain not relieved by medications; fever; numbness, tingling; excessive diarrhea, inability to tolerate liquids or foods; increased  irritability or sluggishness, rash  Follow up  in one week bm bx day 120  I examined patient under Dr. Wilder's supervision and Dr. Wilder agrees to plan of care as listed above

## 2023-08-11 NOTE — REVIEW OF SYSTEMS
[Fatigue] : fatigue [Fever] : no fever [Chills] : no chills [Night Sweats] : no night sweats [Recent Change In Weight] : ~T no recent weight change [Eye Pain] : no eye pain [Red Eyes] : eyes not red [Dry Eyes] : no dryness of the eyes [Vision Problems] : no vision problems [Skin Rash] : no skin rash [Skin Wound] : no skin wound [Negative] : Constitutional [de-identified] :  Right index finger hyperpigmented

## 2023-08-11 NOTE — HISTORY OF PRESENT ILLNESS
[de-identified] : 38 y/o M with no significant PMHx diagnosed with very severe aplastic anemia in April 2020. H confirmed on bone marrow biopsy at [facility] and now s/p inpatient treatment beginning 4/23/2020 with horse ATG + CsA + promacta with complete remission. Bone marrow bx on 11/9/20 revealed hypocellular marrow, overall cellularity improved (20-50%) showing marrow regeneration. BMBx on 5/18/2021 showing normal morphology with normal cellularity. Unfortunately patient relapsed in November 2021 with worsening cytopenias and neutropenic fever. He was admitted for treatment with rabbit ATG, prednisone, cyclosporine and Promacta with partial response  starting 12/27/21. 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 He reports doing well with ravulizumab and is able to continue working full time.\par  He denies bleeding, bruising, fever, chills, night sweats, appetite changes.\par  \par  He presents today with his partner for follow up. He reports feeling okay, he continues to work full time. His energy, activity, and appetite are good. He however does feel fatigued when his Hb is low.  We discussed the plans for transplant. We will likely used his haplo sister as a donor and are completing work up for the [facility] as back up. He is completing his pre-testing in the coming weeks. He would like to proceed with transplant at the end of April to the beginning of May.\par  \par  \par  PMHX:\par  Asthma\par  \par  Allergy:\par  NKDA\par  \par  SocialHx:\par  Lives in  with partner and her family\par  No children\par  Manages oil terminal \par  Denies smoking, ETOH use, illicit drug use\par  \par  \par  FamilyHx:\par  Father - unknown\par  Mother - HTN\par  Siblings - no medical history\par  \par  Dentist: needs to go\par   [de-identified] : This is a 37 year old male with severe aplastic anemia admitted for a haplo-identical bone marrow from his sister (6/12) with Flu / Cy / TBI prep regimen. Hematologic history as follows: initially diagnosed by bone marrow biopsy 4/20, began treatment 4/23/20 with horse ATG + CSA + promacta and achieved CR1. A bone marrow biopsy 11/9/20 showed hypocellular marrow, with overall improved cellularity showing marrow regeneration. Bone marrow biopsy 5/18/21 showed normal morphology and normal cellularity. He relapsed 11/2021, developing worsening cytopenias and neutropenic fevers. He was then treated with rabbit ATG, prednisone, CSA and promacta with partial response 12/27/21.  He later developed worsening hemolysis and pancytopenia, with a rising PNH clone and was treated with Ravulizumab.   Upon admission, a TLC was placed in IR. Mr. Puckett received IV hydration, pain management, nutritional support, and antibacterial / antiviral / antifungal / VOD / GI and PCP prophylaxis. Labs were monitored on a daily basis, and he received transfusional support and electrolyte repletion as needed. While admitted, Mr. Puckett experienced pancytopenia related to the high dose chemotherapy prep regimen. When he became neutropenic, he was started on prophylactic ciprofloxacin. He also had neutropenic fevers. When he became febrile, blood and urine cultures were sent, a CXR completed the ciprofloxacin was changed to cefepime.   On 6/8/23, after pre-medication Mr. Puckett received 548 mL of allogeneic, related, fresh, pooled, plasma reduced, haplo-identical HPC marrow for over approximately 90 min. Cell counts as follows  Total MNC( x10^8/kg)=21.08  CD34+cells ( x10^6 kg)=1.65  CD3+cells( x10^7)=2.94  Cell Viability (%)= 99  During the infusion Mr. Puckett developed hives and required additional dose  of benadryl and hydrocortisone injection.   Post transplant, on 6/12/23 he experienced a fever attributed to CRS post transplant. He was treated with a dose of tocilizumab. On 6/20/23, he developed clinical sinusitis, the cefepime was changed to zosyn. He completed a 10 day course. Pansinusitis was confirmed on a CT maxillofacial / orbit. Opthalmology was consulted on 6/21/23 for eye pain. Mr. Puckett also experienced chemotherapy induced diarrhea. C. diff was negative on 6/11/23.   Engraftment was noted on 6/26/23. The daily zarxio was discontinued on 6/30/23, as was the zosyn. Post engraftment a FISH for Y was sent to determine chimerism, results are pending. A CMV PCR was sent as well. Currently, Mr. Puckett is stable for discharge home with outpatient follow up at the [facility].   On 7/5/23 visit, day + 27 post Haplo identical bone marrow transplant from his sister on 6/8/23. Overall fatigued but, feels well overall. Felt bumps on bilateral hands over the weekend. Right index finger hyperpigmented. On Cellcept 1000 mg TID and FK 4.5 mg BID. Did not take FK prior to blood work today. Denies fever, chills, nausea, vomiting, diarrhea, rash, mouth sores, dysuria or any signs of active bleeding. Denies SOB, chest pain or B/L LE edema. Remains compliant with post transplant medications. Remains compliant with post transplant diet and crowd restrictions.   On 7/19/23 visit, day + 46 post Allogeneic PBSCT. On Cellcept 1000 mg QD and FK 4.5 mg BID. Overall well with no acute concerns. Complains of fatigue and pain behind the left eye. Denies any vision changes. Denies fever, chills, nausea, vomiting, diarrhea, rash, mouth sores, dysuria or any signs of active bleeding. Denies SOB, chest pain or B/L LE edema. Remains compliant with post transplant medications. Remains compliant with post transplant diet and crowd restrictions.   8/3/23:Today is Day + 60 post Allogeneic PBSCT.Denies fever, chills, nausea,vomiting, diarrhea, SOB,chest pain,edema, rash,mouth sores,dysuria or any signs of active bleeding.On FK 4.5mg 2 x day.He is off Cellcept since last week.Remains compliant with medications as prescribed. Remains compliant with diet and crowd restrictions. Describes occ double vision  8/11/23: Patient is seen for a follow up visit. Today is Day +64.Overall feels well with no acute concerns. Denies fever, chills,SOB,chest pain, edema, rash, mouth sores,nausea, vomiting, diarrhea or any signs of active bleeding. On FK 5mg 2 x day. Remains compliant with post transplantm edications. Remains compliant with diet and crowd restrictions.

## 2023-08-11 NOTE — PHYSICAL EXAM
[Restricted in physically strenuous activity but ambulatory and able to carry out work of a light or sedentary nature] : Status 1- Restricted in physically strenuous activity but ambulatory and able to carry out work of a light or sedentary nature, e.g., light house work, office work [Normal] : affect appropriate [Yes] : Yes [Cellcept] : Cellcept [] :  [No active (erythematous_ GVHD rash)] : Skin: No active (erythematous_ GVHD rash) [< 2 mg/dl] : Liver: < 2 mg/dl [No or intermittent] : Upper GI: No or intermittent nausea, vomiting or anorexia [<500 ml/day or < 3 episodes/day] : Lower GI (stool output/day): <500 ml/day or <3 episodes/day [No] : No [de-identified] :  skin hyperpigmented..few acne like lesions on face [FreeTextEntry1] : 06/08/2023

## 2023-08-14 LAB
ALBUMIN SERPL ELPH-MCNC: 4.8 G/DL
ALP BLD-CCNC: 114 U/L
ALT SERPL-CCNC: 72 U/L
ANION GAP SERPL CALC-SCNC: 14 MMOL/L
AST SERPL-CCNC: 49 U/L
BILIRUB SERPL-MCNC: 0.4 MG/DL
BUN SERPL-MCNC: 18 MG/DL
CALCIUM SERPL-MCNC: 9.9 MG/DL
CHLORIDE SERPL-SCNC: 105 MMOL/L
CMV DNA SPEC QL NAA+PROBE: NOT DETECTED IU/ML
CMVPCR LOG: NOT DETECTED LOG10IU/ML
CO2 SERPL-SCNC: 24 MMOL/L
CREAT SERPL-MCNC: 0.89 MG/DL
EGFR: 115 ML/MIN/1.73M2
GLUCOSE SERPL-MCNC: 103 MG/DL
LDH SERPL-CCNC: 243 U/L
MAGNESIUM SERPL-MCNC: 1.6 MG/DL
POTASSIUM SERPL-SCNC: 4.3 MMOL/L
PROT SERPL-MCNC: 7.5 G/DL
SODIUM SERPL-SCNC: 142 MMOL/L
TACROLIMUS SERPL-MCNC: 6.7 NG/ML

## 2023-08-15 ENCOUNTER — APPOINTMENT (OUTPATIENT)
Dept: UROLOGY | Facility: CLINIC | Age: 38
End: 2023-08-15

## 2023-08-17 ENCOUNTER — APPOINTMENT (OUTPATIENT)
Dept: HEMATOLOGY ONCOLOGY | Facility: CLINIC | Age: 38
End: 2023-08-17
Payer: COMMERCIAL

## 2023-08-17 ENCOUNTER — RESULT REVIEW (OUTPATIENT)
Age: 38
End: 2023-08-17

## 2023-08-17 VITALS
TEMPERATURE: 96.6 F | RESPIRATION RATE: 16 BRPM | SYSTOLIC BLOOD PRESSURE: 122 MMHG | BODY MASS INDEX: 32.09 KG/M2 | WEIGHT: 210.76 LBS | OXYGEN SATURATION: 99 % | HEART RATE: 85 BPM | DIASTOLIC BLOOD PRESSURE: 82 MMHG

## 2023-08-17 LAB
BASOPHILS # BLD AUTO: 0.04 K/UL — SIGNIFICANT CHANGE UP (ref 0–0.2)
BASOPHILS NFR BLD AUTO: 0.6 % — SIGNIFICANT CHANGE UP (ref 0–2)
EOSINOPHIL # BLD AUTO: 0.23 K/UL — SIGNIFICANT CHANGE UP (ref 0–0.5)
EOSINOPHIL NFR BLD AUTO: 3.5 % — SIGNIFICANT CHANGE UP (ref 0–6)
HCT VFR BLD CALC: 37.5 % — LOW (ref 39–50)
HGB BLD-MCNC: 13.5 G/DL — SIGNIFICANT CHANGE UP (ref 13–17)
IMM GRANULOCYTES NFR BLD AUTO: 0.3 % — SIGNIFICANT CHANGE UP (ref 0–0.9)
LYMPHOCYTES # BLD AUTO: 1.63 K/UL — SIGNIFICANT CHANGE UP (ref 1–3.3)
LYMPHOCYTES # BLD AUTO: 24.9 % — SIGNIFICANT CHANGE UP (ref 13–44)
MCHC RBC-ENTMCNC: 33.4 PG — SIGNIFICANT CHANGE UP (ref 27–34)
MCHC RBC-ENTMCNC: 36 G/DL — SIGNIFICANT CHANGE UP (ref 32–36)
MCV RBC AUTO: 92.8 FL — SIGNIFICANT CHANGE UP (ref 80–100)
MONOCYTES # BLD AUTO: 0.6 K/UL — SIGNIFICANT CHANGE UP (ref 0–0.9)
MONOCYTES NFR BLD AUTO: 9.2 % — SIGNIFICANT CHANGE UP (ref 2–14)
NEUTROPHILS # BLD AUTO: 4.02 K/UL — SIGNIFICANT CHANGE UP (ref 1.8–7.4)
NEUTROPHILS NFR BLD AUTO: 61.5 % — SIGNIFICANT CHANGE UP (ref 43–77)
NRBC # BLD: 0 /100 WBCS — SIGNIFICANT CHANGE UP (ref 0–0)
PLATELET # BLD AUTO: 166 K/UL — SIGNIFICANT CHANGE UP (ref 150–400)
RBC # BLD: 4.04 M/UL — LOW (ref 4.2–5.8)
RBC # FLD: 16.7 % — HIGH (ref 10.3–14.5)
WBC # BLD: 6.54 K/UL — SIGNIFICANT CHANGE UP (ref 3.8–10.5)
WBC # FLD AUTO: 6.54 K/UL — SIGNIFICANT CHANGE UP (ref 3.8–10.5)

## 2023-08-17 PROCEDURE — 99215 OFFICE O/P EST HI 40 MIN: CPT

## 2023-08-18 LAB
ALBUMIN SERPL ELPH-MCNC: 4.9 G/DL
ALP BLD-CCNC: 113 U/L
ALT SERPL-CCNC: 51 U/L
ANION GAP SERPL CALC-SCNC: 14 MMOL/L
AST SERPL-CCNC: 24 U/L
BILIRUB SERPL-MCNC: 0.3 MG/DL
BUN SERPL-MCNC: 17 MG/DL
CALCIUM SERPL-MCNC: 10 MG/DL
CHLORIDE SERPL-SCNC: 105 MMOL/L
CO2 SERPL-SCNC: 22 MMOL/L
CREAT SERPL-MCNC: 0.85 MG/DL
EGFR: 116 ML/MIN/1.73M2
GLUCOSE SERPL-MCNC: 106 MG/DL
LDH SERPL-CCNC: 190 U/L
MAGNESIUM SERPL-MCNC: 1.6 MG/DL
POTASSIUM SERPL-SCNC: 4.6 MMOL/L
PROT SERPL-MCNC: 7.4 G/DL
SODIUM SERPL-SCNC: 140 MMOL/L
TACROLIMUS SERPL-MCNC: 7 NG/ML

## 2023-08-20 LAB
CMV DNA SPEC QL NAA+PROBE: NOT DETECTED IU/ML
CMVPCR LOG: NOT DETECTED LOG10IU/ML

## 2023-08-23 ENCOUNTER — RESULT REVIEW (OUTPATIENT)
Age: 38
End: 2023-08-23

## 2023-08-23 ENCOUNTER — APPOINTMENT (OUTPATIENT)
Dept: HEMATOLOGY ONCOLOGY | Facility: CLINIC | Age: 38
End: 2023-08-23
Payer: COMMERCIAL

## 2023-08-23 VITALS
SYSTOLIC BLOOD PRESSURE: 134 MMHG | WEIGHT: 212.5 LBS | HEART RATE: 83 BPM | BODY MASS INDEX: 32.36 KG/M2 | RESPIRATION RATE: 16 BRPM | DIASTOLIC BLOOD PRESSURE: 81 MMHG | OXYGEN SATURATION: 98 % | TEMPERATURE: 96.6 F

## 2023-08-23 LAB
BASOPHILS # BLD AUTO: 0.03 K/UL — SIGNIFICANT CHANGE UP (ref 0–0.2)
BASOPHILS NFR BLD AUTO: 0.5 % — SIGNIFICANT CHANGE UP (ref 0–2)
EOSINOPHIL # BLD AUTO: 0.19 K/UL — SIGNIFICANT CHANGE UP (ref 0–0.5)
EOSINOPHIL NFR BLD AUTO: 3.4 % — SIGNIFICANT CHANGE UP (ref 0–6)
HCT VFR BLD CALC: 39.1 % — SIGNIFICANT CHANGE UP (ref 39–50)
HGB BLD-MCNC: 13.7 G/DL — SIGNIFICANT CHANGE UP (ref 13–17)
IMM GRANULOCYTES NFR BLD AUTO: 0.4 % — SIGNIFICANT CHANGE UP (ref 0–0.9)
LYMPHOCYTES # BLD AUTO: 1.37 K/UL — SIGNIFICANT CHANGE UP (ref 1–3.3)
LYMPHOCYTES # BLD AUTO: 24.5 % — SIGNIFICANT CHANGE UP (ref 13–44)
MCHC RBC-ENTMCNC: 33.2 PG — SIGNIFICANT CHANGE UP (ref 27–34)
MCHC RBC-ENTMCNC: 35 G/DL — SIGNIFICANT CHANGE UP (ref 32–36)
MCV RBC AUTO: 94.7 FL — SIGNIFICANT CHANGE UP (ref 80–100)
MONOCYTES # BLD AUTO: 0.47 K/UL — SIGNIFICANT CHANGE UP (ref 0–0.9)
MONOCYTES NFR BLD AUTO: 8.4 % — SIGNIFICANT CHANGE UP (ref 2–14)
NEUTROPHILS # BLD AUTO: 3.52 K/UL — SIGNIFICANT CHANGE UP (ref 1.8–7.4)
NEUTROPHILS NFR BLD AUTO: 62.8 % — SIGNIFICANT CHANGE UP (ref 43–77)
NRBC # BLD: 0 /100 WBCS — SIGNIFICANT CHANGE UP (ref 0–0)
PLATELET # BLD AUTO: 149 K/UL — LOW (ref 150–400)
RBC # BLD: 4.13 M/UL — LOW (ref 4.2–5.8)
RBC # FLD: 15.9 % — HIGH (ref 10.3–14.5)
TACROLIMUS SERPL-MCNC: 5.4 NG/ML
WBC # BLD: 5.6 K/UL — SIGNIFICANT CHANGE UP (ref 3.8–10.5)
WBC # FLD AUTO: 5.6 K/UL — SIGNIFICANT CHANGE UP (ref 3.8–10.5)

## 2023-08-23 PROCEDURE — 99215 OFFICE O/P EST HI 40 MIN: CPT

## 2023-08-24 LAB
ALBUMIN SERPL ELPH-MCNC: 5.1 G/DL
ALP BLD-CCNC: 115 U/L
ALT SERPL-CCNC: 44 U/L
ANION GAP SERPL CALC-SCNC: 12 MMOL/L
AST SERPL-CCNC: 21 U/L
BILIRUB SERPL-MCNC: 0.4 MG/DL
BUN SERPL-MCNC: 18 MG/DL
CALCIUM SERPL-MCNC: 9.9 MG/DL
CHLORIDE SERPL-SCNC: 104 MMOL/L
CMV DNA SPEC QL NAA+PROBE: ABNORMAL IU/ML
CMVPCR LOG: ABNORMAL LOG10IU/ML
CO2 SERPL-SCNC: 25 MMOL/L
CREAT SERPL-MCNC: 0.86 MG/DL
EGFR: 116 ML/MIN/1.73M2
GLUCOSE SERPL-MCNC: 97 MG/DL
LDH SERPL-CCNC: 175 U/L
MAGNESIUM SERPL-MCNC: 1.7 MG/DL
POTASSIUM SERPL-SCNC: 4.7 MMOL/L
PROT SERPL-MCNC: 7.1 G/DL
SODIUM SERPL-SCNC: 141 MMOL/L

## 2023-08-26 NOTE — REVIEW OF SYSTEMS
[Fatigue] : fatigue [Negative] : Allergic/Immunologic [Fever] : no fever [Chills] : no chills [Night Sweats] : no night sweats [Recent Change In Weight] : ~T no recent weight change [Eye Pain] : no eye pain [Red Eyes] : eyes not red [Dry Eyes] : no dryness of the eyes [Vision Problems] : no vision problems [Skin Rash] : no skin rash [Skin Wound] : no skin wound [de-identified] :  Right index finger hyperpigmented, Mild erythema of face.

## 2023-08-26 NOTE — HISTORY OF PRESENT ILLNESS
[de-identified] : 38 y/o M with no significant PMHx diagnosed with very severe aplastic anemia in April 2020. H confirmed on bone marrow biopsy at [facility] and now s/p inpatient treatment beginning 4/23/2020 with horse ATG + CsA + promacta with complete remission. Bone marrow bx on 11/9/20 revealed hypocellular marrow, overall cellularity improved (20-50%) showing marrow regeneration. BMBx on 5/18/2021 showing normal morphology with normal cellularity. Unfortunately patient relapsed in November 2021 with worsening cytopenias and neutropenic fever. He was admitted for treatment with rabbit ATG, prednisone, cyclosporine and Promacta with partial response  starting 12/27/21. 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 He reports doing well with ravulizumab and is able to continue working full time.\par  He denies bleeding, bruising, fever, chills, night sweats, appetite changes.\par  \par  He presents today with his partner for follow up. He reports feeling okay, he continues to work full time. His energy, activity, and appetite are good. He however does feel fatigued when his Hb is low.  We discussed the plans for transplant. We will likely used his haplo sister as a donor and are completing work up for the [facility] as back up. He is completing his pre-testing in the coming weeks. He would like to proceed with transplant at the end of April to the beginning of May.\par  \par  \par  PMHX:\par  Asthma\par  \par  Allergy:\par  NKDA\par  \par  SocialHx:\par  Lives in  with partner and her family\par  No children\par  Manages oil terminal \par  Denies smoking, ETOH use, illicit drug use\par  \par  \par  FamilyHx:\par  Father - unknown\par  Mother - HTN\par  Siblings - no medical history\par  \par  Dentist: needs to go\par   [de-identified] : This is a 37 year old male with severe aplastic anemia admitted for a haplo-identical bone marrow from his sister (6/12) with Flu / Cy / TBI prep regimen. Hematologic history as follows: initially diagnosed by bone marrow biopsy 4/20, began treatment 4/23/20 with horse ATG + CSA + promacta and achieved CR1. A bone marrow biopsy 11/9/20 showed hypocellular marrow, with overall improved cellularity showing marrow regeneration. Bone marrow biopsy 5/18/21 showed normal morphology and normal cellularity. He relapsed 11/2021, developing worsening cytopenias and neutropenic fevers. He was then treated with rabbit ATG, prednisone, CSA and promacta with partial response 12/27/21.  He later developed worsening hemolysis and pancytopenia, with a rising PNH clone and was treated with Ravulizumab.   Upon admission, a TLC was placed in IR. Mr. Puckett received IV hydration, pain management, nutritional support, and antibacterial / antiviral / antifungal / VOD / GI and PCP prophylaxis. Labs were monitored on a daily basis, and he received transfusional support and electrolyte repletion as needed. While admitted, Mr. Puckett experienced pancytopenia related to the high dose chemotherapy prep regimen. When he became neutropenic, he was started on prophylactic ciprofloxacin. He also had neutropenic fevers. When he became febrile, blood and urine cultures were sent, a CXR completed the ciprofloxacin was changed to cefepime.   On 6/8/23, after pre-medication Mr. Puckett received 548 mL of allogeneic, related, fresh, pooled, plasma reduced, haplo-identical HPC marrow for over approximately 90 min. Cell counts as follows  Total MNC( x10^8/kg)=21.08  CD34+cells ( x10^6 kg)=1.65  CD3+cells( x10^7)=2.94  Cell Viability (%)= 99  During the infusion Mr. Puckett developed hives and required additional dose  of benadryl and hydrocortisone injection.   Post transplant, on 6/12/23 he experienced a fever attributed to CRS post transplant. He was treated with a dose of tocilizumab. On 6/20/23, he developed clinical sinusitis, the cefepime was changed to zosyn. He completed a 10 day course. Pansinusitis was confirmed on a CT maxillofacial / orbit. Opthalmology was consulted on 6/21/23 for eye pain. Mr. Puckett also experienced chemotherapy induced diarrhea. C. diff was negative on 6/11/23.   Engraftment was noted on 6/26/23. The daily zarxio was discontinued on 6/30/23, as was the zosyn. Post engraftment a FISH for Y was sent to determine chimerism, results are pending. A CMV PCR was sent as well. Currently, Mr. Puckett is stable for discharge home with outpatient follow up at the [facility].   On 7/5/23 visit, day + 27 post Haplo identical bone marrow transplant from his sister on 6/8/23. Overall fatigued but, feels well overall. Felt bumps on bilateral hands over the weekend. Right index finger hyperpigmented. On Cellcept 1000 mg TID and FK 4.5 mg BID. Did not take FK prior to blood work today. Denies fever, chills, nausea, vomiting, diarrhea, rash, mouth sores, dysuria or any signs of active bleeding. Denies SOB, chest pain or B/L LE edema. Remains compliant with post transplant medications. Remains compliant with post transplant diet and crowd restrictions.   On 7/19/23 visit, day + 46 post Allogeneic PBSCT. On Cellcept 1000 mg QD and FK 4.5 mg BID. Overall well with no acute concerns. Complains of fatigue and pain behind the left eye. Denies any vision changes. Denies fever, chills, nausea, vomiting, diarrhea, rash, mouth sores, dysuria or any signs of active bleeding. Denies SOB, chest pain or B/L LE edema. Remains compliant with post transplant medications. Remains compliant with post transplant diet and crowd restrictions.   8/3/23:Today is Day + 60 post Allogeneic PBSCT.Denies fever, chills, nausea,vomiting, diarrhea, SOB,chest pain,edema, rash,mouth sores,dysuria or any signs of active bleeding.On FK 4.5mg 2 x day.He is off Cellcept since last week.Remains compliant with medications as prescribed. Remains compliant with diet and crowd restrictions. Describes occ double vision  8/11/23: Patient is seen for a follow up visit. Today is Day +64.Overall feels well with no acute concerns. Denies fever, chills,SOB,chest pain, edema, rash, mouth sores,nausea, vomiting, diarrhea or any signs of active bleeding. On FK 5mg 2 x day. Remains compliant with post transplantm edications. Remains compliant with diet and crowd restrictions.  On 8/17/23, patient presents for a follow up visit. Overall well and offers no acute concerns. Mild erythema of face. On FK 5 mg BID. Denies fever, nausea, vomiting, diarrhea, mouth sores, dysuria or any signs of active bleeding. Denies SOB, chest pain or B/L LE edema.

## 2023-08-26 NOTE — PHYSICAL EXAM
[Restricted in physically strenuous activity but ambulatory and able to carry out work of a light or sedentary nature] : Status 1- Restricted in physically strenuous activity but ambulatory and able to carry out work of a light or sedentary nature, e.g., light house work, office work [Normal] : affect appropriate [Yes] : Yes [Cellcept] : Cellcept [] :  [No active (erythematous_ GVHD rash)] : Skin: No active (erythematous_ GVHD rash) [< 2 mg/dl] : Liver: < 2 mg/dl [No or intermittent] : Upper GI: No or intermittent nausea, vomiting or anorexia [<500 ml/day or < 3 episodes/day] : Lower GI (stool output/day): <500 ml/day or <3 episodes/day [No] : No [de-identified] :  skin hyperpigmented..few acne like lesions on face, mild erythema of face  [FreeTextEntry1] : 06/08/2023

## 2023-08-26 NOTE — ASSESSMENT
[FreeTextEntry1] : This is a 37 year old male with severe aplastic anemia admitted for a haplo-identical bone marrow from his sister (6/12) with Flu / Cy / TBI prep regimen. Hematologic history as follows: initially diagnosed by bone marrow  biopsy 4/20, began treatment 4/23/20 with horse ATG + CSA + promacta and achieved CR1. A bone marrow biopsy 11/9/20 showed hypocellular marrow, with overall improved cellularity showing marrow regeneration. Bone marrow biopsy 5/18/21 showed normal morphology and normal cellularity. He relapsed 11/2021, developing worsening cytopenias and neutropenic fevers. He was then treated with rabbit ATG, prednisone, CSA and promacta with partial response 12/27/21. He later developed worsening hemolysis and pancytopenia, with a rising PNH clone and was treated with Ravulizumab. Status post Haplo identical bone marrow transplant from his sister on 6/8/23.  1) Severe aplastic anemia. -Status post Haplo identical bone marrow transplant from his sister on 6/8/23. -7/3/23: FISH for Y= 96% donor  7/10/23: FISH for Y = 99.5% donor 8/3/23: FISH for Y =100% donor  2) Heme Counts stable. No indication for transfusions today. Continue folic acid 1 mg oral tablet: 1 tab(s) orally once a day and Multiple Vitamins oral tablet: 1 tab(s) orally once a day.  3) ID Continue ppx: Acyclovir 400 mg oral tablet: 1 tab(s) orally every 12 hours  Atovaquone 750 mg/5 mL oral suspension: 5 milliliter(s) orally 2 times a day  Fluconazole 200 mg oral tablet: 2 tab(s) orally once a day- HOLD as of  7/10/23. Letermovir 480 mg oral tablet: 1 tab(s) orally once a day.  GVHD Skin 0 Liver 0 GI 0- overall grade 0 On FK 5 mg BID On Cellcept to 1000 mg BID. On 7/15/23, decrease cellcept to 1000 mg daily.OFF SINCE LAST WEEK (Week of  7/17/23) Reviewed signs and symptoms of GVHD.  7/5/23 CMV PCR negative. Continue to monitor weekly.  7/19/23:CMV PCR negative 8/3/23: CMV PCR-<34.5 IU/Ml  4) GI Continue ppx: Ondansetron 8 mg oral tablet: 1 tab(s) orally every 8 hours as needed for nausea  Pepcid 20 mg oral tablet: 1 tab(s) orally 2 times a day   Transaminitis: 7/3/23: AST 47, , Alk Phos 115. Continue to monitor HOLD fluconazole as of 7/10/23 7/27/23: AST 41  AlkPhos 106 .....continue to HOLD Fluconazole Continue to HOLD Fluconazole  5) Other Continue: Magnesium oxide 400 mg oral tablet: 1 tab(s) orally 3 times a day (with meals)  Ursodiol 300 mg oral capsule: 1 cap(s) orally 2 times a day  Eye Pain / occ double vision- Continue to monitor. head ct ordered-RESOLVED Mild erythema of face- prescribed clobetasol on 8/17/23.   6) Plan Continue weekly appointments at this time. Continue post transplant diet and crowd restrictions. NO RESTAURANT OR TAKE OUT FOOD AT THIS TIME, ONLY HOME COOKED PREPARED/FROZEN FOODS. You are allowed to have fresh baked pizza right out of the oven. This is the ONLY takeout food at this time.  Notify your physician if bleeding; swelling; persistent nausea and vomiting; unable to urinate; pain not relieved by medications; fever; numbness, tingling; excessive diarrhea, inability to tolerate liquids or foods; increased  irritability or sluggishness, rash  Follow up  in one week bm bx day 120  I examined patient under Dr. Wilder's supervision and Dr. Wilder agrees to plan of care as listed above

## 2023-08-28 NOTE — REASON FOR VISIT
[Follow-Up Visit] : a follow-up visit for [FreeTextEntry2] : MADAY s/p haplo identical bone marrow transplant from his sister on 6/8/23.

## 2023-08-28 NOTE — REVIEW OF SYSTEMS
[Fever] : no fever [Chills] : no chills [Night Sweats] : no night sweats [Fatigue] : fatigue [Recent Change In Weight] : ~T no recent weight change [Eye Pain] : no eye pain [Red Eyes] : eyes not red [Dry Eyes] : no dryness of the eyes [Vision Problems] : no vision problems [Skin Rash] : no skin rash [Skin Wound] : no skin wound [Negative] : Allergic/Immunologic [de-identified] :  Right index finger hyperpigmented, Mild erythema of face.

## 2023-08-28 NOTE — HISTORY OF PRESENT ILLNESS
[de-identified] : 38 y/o M with no significant PMHx diagnosed with very severe aplastic anemia in April 2020. H confirmed on bone marrow biopsy at [facility] and now s/p inpatient treatment beginning 4/23/2020 with horse ATG + CsA + promacta with complete remission. Bone marrow bx on 11/9/20 revealed hypocellular marrow, overall cellularity improved (20-50%) showing marrow regeneration. BMBx on 5/18/2021 showing normal morphology with normal cellularity. Unfortunately patient relapsed in November 2021 with worsening cytopenias and neutropenic fever. He was admitted for treatment with rabbit ATG, prednisone, cyclosporine and Promacta with partial response  starting 12/27/21. 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 He reports doing well with ravulizumab and is able to continue working full time.\par  He denies bleeding, bruising, fever, chills, night sweats, appetite changes.\par  \par  He presents today with his partner for follow up. He reports feeling okay, he continues to work full time. His energy, activity, and appetite are good. He however does feel fatigued when his Hb is low.  We discussed the plans for transplant. We will likely used his haplo sister as a donor and are completing work up for the [facility] as back up. He is completing his pre-testing in the coming weeks. He would like to proceed with transplant at the end of April to the beginning of May.\par  \par  \par  PMHX:\par  Asthma\par  \par  Allergy:\par  NKDA\par  \par  SocialHx:\par  Lives in  with partner and her family\par  No children\par  Manages oil terminal \par  Denies smoking, ETOH use, illicit drug use\par  \par  \par  FamilyHx:\par  Father - unknown\par  Mother - HTN\par  Siblings - no medical history\par  \par  Dentist: needs to go\par   [de-identified] : This is a 37 year old male with severe aplastic anemia admitted for a haplo-identical bone marrow from his sister (6/12) with Flu / Cy / TBI prep regimen. Hematologic history as follows: initially diagnosed by bone marrow biopsy 4/20, began treatment 4/23/20 with horse ATG + CSA + promacta and achieved CR1. A bone marrow biopsy 11/9/20 showed hypocellular marrow, with overall improved cellularity showing marrow regeneration. Bone marrow biopsy 5/18/21 showed normal morphology and normal cellularity. He relapsed 11/2021, developing worsening cytopenias and neutropenic fevers. He was then treated with rabbit ATG, prednisone, CSA and promacta with partial response 12/27/21.  He later developed worsening hemolysis and pancytopenia, with a rising PNH clone and was treated with Ravulizumab.   Upon admission, a TLC was placed in IR. Mr. Puckett received IV hydration, pain management, nutritional support, and antibacterial / antiviral / antifungal / VOD / GI and PCP prophylaxis. Labs were monitored on a daily basis, and he received transfusional support and electrolyte repletion as needed. While admitted, Mr. Puckett experienced pancytopenia related to the high dose chemotherapy prep regimen. When he became neutropenic, he was started on prophylactic ciprofloxacin. He also had neutropenic fevers. When he became febrile, blood and urine cultures were sent, a CXR completed the ciprofloxacin was changed to cefepime.   On 6/8/23, after pre-medication Mr. Puckett received 548 mL of allogeneic, related, fresh, pooled, plasma reduced, haplo-identical HPC marrow for over approximately 90 min. Cell counts as follows  Total MNC( x10^8/kg)=21.08  CD34+cells ( x10^6 kg)=1.65  CD3+cells( x10^7)=2.94  Cell Viability (%)= 99  During the infusion Mr. Puckett developed hives and required additional dose  of benadryl and hydrocortisone injection.   Post transplant, on 6/12/23 he experienced a fever attributed to CRS post transplant. He was treated with a dose of tocilizumab. On 6/20/23, he developed clinical sinusitis, the cefepime was changed to zosyn. He completed a 10 day course. Pansinusitis was confirmed on a CT maxillofacial / orbit. Opthalmology was consulted on 6/21/23 for eye pain. Mr. Puckett also experienced chemotherapy induced diarrhea. C. diff was negative on 6/11/23.   Engraftment was noted on 6/26/23. The daily zarxio was discontinued on 6/30/23, as was the zosyn. Post engraftment a FISH for Y was sent to determine chimerism, results are pending. A CMV PCR was sent as well. Currently, Mr. Puckett is stable for discharge home with outpatient follow up at the [facility].   On 7/5/23 visit, day + 27 post Haplo identical bone marrow transplant from his sister on 6/8/23. Overall fatigued but, feels well overall. Felt bumps on bilateral hands over the weekend. Right index finger hyperpigmented. On Cellcept 1000 mg TID and FK 4.5 mg BID. Did not take FK prior to blood work today. Denies fever, chills, nausea, vomiting, diarrhea, rash, mouth sores, dysuria or any signs of active bleeding. Denies SOB, chest pain or B/L LE edema. Remains compliant with post transplant medications. Remains compliant with post transplant diet and crowd restrictions.   On 7/19/23 visit, day + 46 post Allogeneic PBSCT. On Cellcept 1000 mg QD and FK 4.5 mg BID. Overall well with no acute concerns. Complains of fatigue and pain behind the left eye. Denies any vision changes. Denies fever, chills, nausea, vomiting, diarrhea, rash, mouth sores, dysuria or any signs of active bleeding. Denies SOB, chest pain or B/L LE edema. Remains compliant with post transplant medications. Remains compliant with post transplant diet and crowd restrictions.   8/3/23:Today is Day + 60 post Allogeneic PBSCT.Denies fever, chills, nausea,vomiting, diarrhea, SOB,chest pain,edema, rash,mouth sores,dysuria or any signs of active bleeding.On FK 4.5mg 2 x day.He is off Cellcept since last week.Remains compliant with medications as prescribed. Remains compliant with diet and crowd restrictions. Describes occ double vision  8/11/23: Patient is seen for a follow up visit. Today is Day +64.Overall feels well with no acute concerns. Denies fever, chills,SOB,chest pain, edema, rash, mouth sores,nausea, vomiting, diarrhea or any signs of active bleeding. On FK 5mg 2 x day. Remains compliant with post transplantm edications. Remains compliant with diet and crowd restrictions.  On 8/23/23, patient presents for a follow up visit. Overall well and offers no acute concerns. Mild erythema of face. On FK 5 mg BID. Denies fever, nausea, vomiting, diarrhea, mouth sores, dysuria or any signs of active bleeding. Denies SOB, chest pain or B/L LE edema.

## 2023-08-28 NOTE — ASSESSMENT
[FreeTextEntry1] : This is a 37 year old male with severe aplastic anemia admitted for a haplo-identical bone marrow from his sister (6/12) with Flu / Cy / TBI prep regimen. Hematologic history as follows: initially diagnosed by bone marrow  biopsy 4/20, began treatment 4/23/20 with horse ATG + CSA + promacta and achieved CR1. A bone marrow biopsy 11/9/20 showed hypocellular marrow, with overall improved cellularity showing marrow regeneration. Bone marrow biopsy 5/18/21 showed normal morphology and normal cellularity. He relapsed 11/2021, developing worsening cytopenias and neutropenic fevers. He was then treated with rabbit ATG, prednisone, CSA and promacta with partial response 12/27/21. He later developed worsening hemolysis and pancytopenia, with a rising PNH clone and was treated with Ravulizumab. Status post Haplo identical bone marrow transplant from his sister on 6/8/23.  1) Severe aplastic anemia. -Status post Haplo identical bone marrow transplant from his sister on 6/8/23. -7/3/23: FISH for Y= 96% donor  7/10/23: FISH for Y = 99.5% donor 8/3/23: FISH for Y =100% donor  2) Heme Counts stable. No indication for transfusions today. Continue folic acid 1 mg oral tablet: 1 tab(s) orally once a day and Multiple Vitamins oral tablet: 1 tab(s) orally once a day.  3) ID Continue ppx: Acyclovir 400 mg oral tablet: 1 tab(s) orally every 12 hours  Atovaquone 750 mg/5 mL oral suspension: 5 milliliter(s) orally 2 times a day  Fluconazole 200 mg oral tablet: 2 tab(s) orally once a day- HOLD as of  7/10/23. Letermovir 480 mg oral tablet: 1 tab(s) orally once a day.  GVHD Skin 0 Liver 0 GI 0- overall grade 0 On FK 5 mg BID On Cellcept to 1000 mg BID. On 7/15/23, decrease cellcept to 1000 mg daily.OFF SINCE LAST WEEK (Week of  7/17/23) Reviewed signs and symptoms of GVHD.  7/5/23 CMV PCR negative. Continue to monitor weekly.  7/19/23:CMV PCR negative 8/3/23: CMV PCR-<34.5 IU/Ml  4) GI Continue ppx: Ondansetron 8 mg oral tablet: 1 tab(s) orally every 8 hours as needed for nausea  Pepcid 20 mg oral tablet: 1 tab(s) orally 2 times a day   Transaminitis: 7/3/23: AST 47, , Alk Phos 115. Continue to monitor HOLD fluconazole as of 7/10/23 7/27/23: AST 41  AlkPhos 106 .....continue to HOLD Fluconazole Continue to HOLD Fluconazole  5) Other Continue: Magnesium oxide 400 mg oral tablet: 1 tab(s) orally 3 times a day (with meals)  Ursodiol 300 mg oral capsule: 1 cap(s) orally 2 times a day  Eye Pain / occ double vision- Continue to monitor. head ct ordered-RESOLVED Mild erythema of face- prescribed clobetasol on 8/17/23.   6) Plan Continue weekly appointments at this time. Continue post transplant diet and crowd restrictions. NO RESTAURANT OR TAKE OUT FOOD AT THIS TIME, ONLY HOME COOKED PREPARED/FROZEN FOODS. You are allowed to have fresh baked pizza right out of the oven. This is the ONLY takeout food at this time.  Notify your physician if bleeding; swelling; persistent nausea and vomiting; unable to urinate; pain not relieved by medications; fever; numbness, tingling; excessive diarrhea, inability to tolerate liquids or foods; increased  irritability or sluggishness, rash  Follow up  in one week..after 100 days pt to f/u every 2 weeks bm bx day 120

## 2023-08-30 ENCOUNTER — APPOINTMENT (OUTPATIENT)
Dept: CT IMAGING | Facility: CLINIC | Age: 38
End: 2023-08-30

## 2023-09-01 ENCOUNTER — APPOINTMENT (OUTPATIENT)
Dept: HEMATOLOGY ONCOLOGY | Facility: CLINIC | Age: 38
End: 2023-09-01

## 2023-09-07 ENCOUNTER — APPOINTMENT (OUTPATIENT)
Dept: HEMATOLOGY ONCOLOGY | Facility: CLINIC | Age: 38
End: 2023-09-07
Payer: COMMERCIAL

## 2023-09-07 ENCOUNTER — RESULT REVIEW (OUTPATIENT)
Age: 38
End: 2023-09-07

## 2023-09-07 VITALS
SYSTOLIC BLOOD PRESSURE: 129 MMHG | HEART RATE: 86 BPM | BODY MASS INDEX: 32.28 KG/M2 | WEIGHT: 212 LBS | RESPIRATION RATE: 16 BRPM | TEMPERATURE: 96.6 F | OXYGEN SATURATION: 98 % | DIASTOLIC BLOOD PRESSURE: 87 MMHG

## 2023-09-07 LAB
BASOPHILS # BLD AUTO: 0.02 K/UL — SIGNIFICANT CHANGE UP (ref 0–0.2)
BASOPHILS NFR BLD AUTO: 0.3 % — SIGNIFICANT CHANGE UP (ref 0–2)
EOSINOPHIL # BLD AUTO: 0.12 K/UL — SIGNIFICANT CHANGE UP (ref 0–0.5)
EOSINOPHIL NFR BLD AUTO: 1.7 % — SIGNIFICANT CHANGE UP (ref 0–6)
HCT VFR BLD CALC: 41 % — SIGNIFICANT CHANGE UP (ref 39–50)
HGB BLD-MCNC: 14.3 G/DL — SIGNIFICANT CHANGE UP (ref 13–17)
IMM GRANULOCYTES NFR BLD AUTO: 0.3 % — SIGNIFICANT CHANGE UP (ref 0–0.9)
LYMPHOCYTES # BLD AUTO: 2.75 K/UL — SIGNIFICANT CHANGE UP (ref 1–3.3)
LYMPHOCYTES # BLD AUTO: 39 % — SIGNIFICANT CHANGE UP (ref 13–44)
MCHC RBC-ENTMCNC: 33.1 PG — SIGNIFICANT CHANGE UP (ref 27–34)
MCHC RBC-ENTMCNC: 34.9 G/DL — SIGNIFICANT CHANGE UP (ref 32–36)
MCV RBC AUTO: 94.9 FL — SIGNIFICANT CHANGE UP (ref 80–100)
MONOCYTES # BLD AUTO: 0.71 K/UL — SIGNIFICANT CHANGE UP (ref 0–0.9)
MONOCYTES NFR BLD AUTO: 10.1 % — SIGNIFICANT CHANGE UP (ref 2–14)
NEUTROPHILS # BLD AUTO: 3.44 K/UL — SIGNIFICANT CHANGE UP (ref 1.8–7.4)
NEUTROPHILS NFR BLD AUTO: 48.6 % — SIGNIFICANT CHANGE UP (ref 43–77)
NRBC # BLD: 0 /100 WBCS — SIGNIFICANT CHANGE UP (ref 0–0)
PLATELET # BLD AUTO: 156 K/UL — SIGNIFICANT CHANGE UP (ref 150–400)
RBC # BLD: 4.32 M/UL — SIGNIFICANT CHANGE UP (ref 4.2–5.8)
RBC # FLD: 14.3 % — SIGNIFICANT CHANGE UP (ref 10.3–14.5)
WBC # BLD: 7.06 K/UL — SIGNIFICANT CHANGE UP (ref 3.8–10.5)
WBC # FLD AUTO: 7.06 K/UL — SIGNIFICANT CHANGE UP (ref 3.8–10.5)

## 2023-09-07 PROCEDURE — 99215 OFFICE O/P EST HI 40 MIN: CPT

## 2023-09-07 RX ORDER — ACYCLOVIR 400 MG/1
400 TABLET ORAL TWICE DAILY
Qty: 60 | Refills: 3 | Status: COMPLETED | COMMUNITY
Start: 2020-06-08 | End: 2024-01-05

## 2023-09-07 NOTE — PHYSICAL EXAM
[Restricted in physically strenuous activity but ambulatory and able to carry out work of a light or sedentary nature] : Status 1- Restricted in physically strenuous activity but ambulatory and able to carry out work of a light or sedentary nature, e.g., light house work, office work [Normal] : affect appropriate [de-identified] :  skin hyperpigmented..few acne like lesions on face, mild erythema of face  [Yes] : Yes [Cellcept] : Cellcept [] :  [No active (erythematous_ GVHD rash)] : Skin: No active (erythematous_ GVHD rash) [No or intermittent] : Upper GI: No or intermittent nausea, vomiting or anorexia [< 2 mg/dl] : Liver: < 2 mg/dl [<500 ml/day or < 3 episodes/day] : Lower GI (stool output/day): <500 ml/day or <3 episodes/day [No] : No [FreeTextEntry1] : 06/08/2023

## 2023-09-07 NOTE — HISTORY OF PRESENT ILLNESS
[de-identified] : 38 y/o M with no significant PMHx diagnosed with very severe aplastic anemia in April 2020. H confirmed on bone marrow biopsy at [facility] and now s/p inpatient treatment beginning 4/23/2020 with horse ATG + CsA + promacta with complete remission. Bone marrow bx on 11/9/20 revealed hypocellular marrow, overall cellularity improved (20-50%) showing marrow regeneration. BMBx on 5/18/2021 showing normal morphology with normal cellularity. Unfortunately patient relapsed in November 2021 with worsening cytopenias and neutropenic fever. He was admitted for treatment with rabbit ATG, prednisone, cyclosporine and Promacta with partial response  starting 12/27/21. 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 He reports doing well with ravulizumab and is able to continue working full time.\par  He denies bleeding, bruising, fever, chills, night sweats, appetite changes.\par  \par  He presents today with his partner for follow up. He reports feeling okay, he continues to work full time. His energy, activity, and appetite are good. He however does feel fatigued when his Hb is low.  We discussed the plans for transplant. We will likely used his haplo sister as a donor and are completing work up for the [facility] as back up. He is completing his pre-testing in the coming weeks. He would like to proceed with transplant at the end of April to the beginning of May.\par  \par  \par  PMHX:\par  Asthma\par  \par  Allergy:\par  NKDA\par  \par  SocialHx:\par  Lives in  with partner and her family\par  No children\par  Manages oil terminal \par  Denies smoking, ETOH use, illicit drug use\par  \par  \par  FamilyHx:\par  Father - unknown\par  Mother - HTN\par  Siblings - no medical history\par  \par  Dentist: needs to go\par   [de-identified] : This is a 37 year old male with severe aplastic anemia admitted for a haplo-identical bone marrow from his sister (6/12) with Flu / Cy / TBI prep regimen. Hematologic history as follows: initially diagnosed by bone marrow biopsy 4/20, began treatment 4/23/20 with horse ATG + CSA + promacta and achieved CR1. A bone marrow biopsy 11/9/20 showed hypocellular marrow, with overall improved cellularity showing marrow regeneration. Bone marrow biopsy 5/18/21 showed normal morphology and normal cellularity. He relapsed 11/2021, developing worsening cytopenias and neutropenic fevers. He was then treated with rabbit ATG, prednisone, CSA and promacta with partial response 12/27/21.  He later developed worsening hemolysis and pancytopenia, with a rising PNH clone and was treated with Ravulizumab.   Upon admission, a TLC was placed in IR. Mr. Puckett received IV hydration, pain management, nutritional support, and antibacterial / antiviral / antifungal / VOD / GI and PCP prophylaxis. Labs were monitored on a daily basis, and he received transfusional support and electrolyte repletion as needed. While admitted, Mr. Puckett experienced pancytopenia related to the high dose chemotherapy prep regimen. When he became neutropenic, he was started on prophylactic ciprofloxacin. He also had neutropenic fevers. When he became febrile, blood and urine cultures were sent, a CXR completed the ciprofloxacin was changed to cefepime.   On 6/8/23, after pre-medication Mr. Puckett received 548 mL of allogeneic, related, fresh, pooled, plasma reduced, haplo-identical HPC marrow for over approximately 90 min. Cell counts as follows  Total MNC( x10^8/kg)=21.08  CD34+cells ( x10^6 kg)=1.65  CD3+cells( x10^7)=2.94  Cell Viability (%)= 99  During the infusion Mr. Puckett developed hives and required additional dose  of benadryl and hydrocortisone injection.   Post transplant, on 6/12/23 he experienced a fever attributed to CRS post transplant. He was treated with a dose of tocilizumab. On 6/20/23, he developed clinical sinusitis, the cefepime was changed to zosyn. He completed a 10 day course. Pansinusitis was confirmed on a CT maxillofacial / orbit. Opthalmology was consulted on 6/21/23 for eye pain. Mr. Puckett also experienced chemotherapy induced diarrhea. C. diff was negative on 6/11/23.   Engraftment was noted on 6/26/23. The daily zarxio was discontinued on 6/30/23, as was the zosyn. Post engraftment a FISH for Y was sent to determine chimerism, results are pending. A CMV PCR was sent as well. Currently, Mr. Puckett is stable for discharge home with outpatient follow up at the [facility].   On 7/5/23 visit, day + 27 post Haplo identical bone marrow transplant from his sister on 6/8/23. Overall fatigued but, feels well overall. Felt bumps on bilateral hands over the weekend. Right index finger hyperpigmented. On Cellcept 1000 mg TID and FK 4.5 mg BID. Did not take FK prior to blood work today. Denies fever, chills, nausea, vomiting, diarrhea, rash, mouth sores, dysuria or any signs of active bleeding. Denies SOB, chest pain or B/L LE edema. Remains compliant with post transplant medications. Remains compliant with post transplant diet and crowd restrictions.   On 7/19/23 visit, day + 46 post Allogeneic PBSCT. On Cellcept 1000 mg QD and FK 4.5 mg BID. Overall well with no acute concerns. Complains of fatigue and pain behind the left eye. Denies any vision changes. Denies fever, chills, nausea, vomiting, diarrhea, rash, mouth sores, dysuria or any signs of active bleeding. Denies SOB, chest pain or B/L LE edema. Remains compliant with post transplant medications. Remains compliant with post transplant diet and crowd restrictions.   8/3/23:Today is Day + 60 post Allogeneic PBSCT.Denies fever, chills, nausea,vomiting, diarrhea, SOB,chest pain,edema, rash,mouth sores,dysuria or any signs of active bleeding.On FK 4.5mg 2 x day.He is off Cellcept since last week.Remains compliant with medications as prescribed. Remains compliant with diet and crowd restrictions. Describes occ double vision  8/11/23: Patient is seen for a follow up visit. Today is Day +64.Overall feels well with no acute concerns. Denies fever, chills,SOB,chest pain, edema, rash, mouth sores,nausea, vomiting, diarrhea or any signs of active bleeding. On FK 5mg 2 x day. Remains compliant with post transplantm edications. Remains compliant with diet and crowd restrictions.  On 9/7//23, patient presents for a follow up visit. Overall well and offers no acute concerns. Mild erythema of face resolved. On FK 5 mg BID. Denies fever, nausea, vomiting, diarrhea, mouth sores, dysuria or any signs of active bleeding. Denies SOB, chest pain or B/L LE edema.

## 2023-09-07 NOTE — REVIEW OF SYSTEMS
[Fever] : no fever [Chills] : no chills [Night Sweats] : no night sweats [Fatigue] : fatigue [Recent Change In Weight] : ~T no recent weight change [Eye Pain] : no eye pain [Red Eyes] : eyes not red [Dry Eyes] : no dryness of the eyes [Vision Problems] : no vision problems [Skin Rash] : no skin rash [Skin Wound] : no skin wound [Negative] : Allergic/Immunologic [de-identified] :  Right index finger hyperpigmented, Mild erythema of face.

## 2023-09-07 NOTE — ASSESSMENT
[FreeTextEntry1] : This is a 37 year old male with severe aplastic anemia admitted for a haplo-identical bone marrow from his sister (6/12) with Flu / Cy / TBI prep regimen. Hematologic history as follows: initially diagnosed by bone marrow  biopsy 4/20, began treatment 4/23/20 with horse ATG + CSA + promacta and achieved CR1. A bone marrow biopsy 11/9/20 showed hypocellular marrow, with overall improved cellularity showing marrow regeneration. Bone marrow biopsy 5/18/21 showed normal morphology and normal cellularity. He relapsed 11/2021, developing worsening cytopenias and neutropenic fevers. He was then treated with rabbit ATG, prednisone, CSA and promacta with partial response 12/27/21. He later developed worsening hemolysis and pancytopenia, with a rising PNH clone and was treated with Ravulizumab. Status post Haplo identical bone marrow transplant from his sister on 6/8/23.  1) Severe aplastic anemia. -Status post Haplo identical bone marrow transplant from his sister on 6/8/23. -7/3/23: FISH for Y= 96% donor  7/10/23: FISH for Y = 99.5% donor 8/3/23: FISH for Y =100% donor  2) Heme Counts stable. No indication for transfusions today. Continue folic acid 1 mg oral tablet: 1 tab(s) orally once a day and Multiple Vitamins oral tablet: 1 tab(s) orally once a day.  3) ID Continue ppx: Acyclovir 400 mg oral tablet: 1 tab(s) orally every 12 hours  Atovaquone 750 mg/5 mL oral suspension: 5 milliliter(s) orally 2 times a day  Fluconazole 200 mg oral tablet: 2 tab(s) orally once a day- HOLD as of  7/10/23. Letermovir 480 mg oral tablet: 1 tab(s) orally once a day.  GVHD Skin 0 Liver 0 GI 0- overall grade 0 On FK 5 mg BID..taper day 180 On Cellcept to 1000 mg BID. On 7/15/23, decrease cellcept to 1000 mg daily.OFF SINCE LAST WEEK (Week of  7/17/23) Reviewed signs and symptoms of GVHD.  7/5/23 CMV PCR negative. Continue to monitor weekly.  7/19/23:CMV PCR negative 8/3/23: CMV PCR-<34.5 IU/Ml  4) GI Continue ppx: Ondansetron 8 mg oral tablet: 1 tab(s) orally every 8 hours as needed for nausea  Pepcid 20 mg oral tablet: 1 tab(s) orally 2 times a day   Transaminitis: 7/3/23: AST 47, , Alk Phos 115. Continue to monitor HOLD fluconazole as of 7/10/23 7/27/23: AST 41  AlkPhos 106 .....continue to HOLD Fluconazole Continue to HOLD Fluconazole  5) Other Continue: Magnesium oxide 400 mg oral tablet: 1 tab(s) orally 3 times a day (with meals)  Ursodiol 300 mg oral capsule: 1 cap(s) orally 2 times a day  Eye Pain / occ double vision- Continue to monitor. head ct ordered-RESOLVED Mild erythema of face- prescribed clobetasol on 8/17/23. resolved  6) Plan Continue post transplant diet and crowd restrictions. NO RESTAURANT OR TAKE OUT FOOD AT THIS TIME, ONLY HOME COOKED PREPARED/FROZEN FOODS. You are allowed to have fresh baked pizza right out of the oven. This is the ONLY takeout food at this time.  Notify your physician if bleeding; swelling; persistent nausea and vomiting; unable to urinate; pain not relieved by medications; fever; numbness, tingling; excessive diarrhea, inability to tolerate liquids or foods; increased  irritability or sluggishness, rash   f/u every 2 weeks bm bx day 120 to 150

## 2023-09-08 LAB
ALBUMIN SERPL ELPH-MCNC: 5.1 G/DL
ALP BLD-CCNC: 124 U/L
ALT SERPL-CCNC: 35 U/L
ANION GAP SERPL CALC-SCNC: 13 MMOL/L
AST SERPL-CCNC: 20 U/L
BILIRUB SERPL-MCNC: 0.2 MG/DL
BUN SERPL-MCNC: 17 MG/DL
CALCIUM SERPL-MCNC: 10 MG/DL
CHLORIDE SERPL-SCNC: 103 MMOL/L
CMV DNA SPEC QL NAA+PROBE: NOT DETECTED IU/ML
CMVPCR LOG: NOT DETECTED LOG10IU/ML
CO2 SERPL-SCNC: 22 MMOL/L
CREAT SERPL-MCNC: 0.89 MG/DL
EGFR: 115 ML/MIN/1.73M2
GLUCOSE SERPL-MCNC: 128 MG/DL
LDH SERPL-CCNC: 200 U/L
MAGNESIUM SERPL-MCNC: 1.7 MG/DL
POTASSIUM SERPL-SCNC: 4.2 MMOL/L
PROT SERPL-MCNC: 7.2 G/DL
SODIUM SERPL-SCNC: 139 MMOL/L
TACROLIMUS SERPL-MCNC: 5.1 NG/ML

## 2023-09-21 ENCOUNTER — OUTPATIENT (OUTPATIENT)
Dept: OUTPATIENT SERVICES | Facility: HOSPITAL | Age: 38
LOS: 1 days | Discharge: HOME | End: 2023-09-21

## 2023-09-21 DIAGNOSIS — Z98.890 OTHER SPECIFIED POSTPROCEDURAL STATES: Chronic | ICD-10-CM

## 2023-09-21 DIAGNOSIS — D61.9 APLASTIC ANEMIA, UNSPECIFIED: ICD-10-CM

## 2023-09-29 ENCOUNTER — APPOINTMENT (OUTPATIENT)
Dept: HEMATOLOGY ONCOLOGY | Facility: CLINIC | Age: 38
End: 2023-09-29
Payer: COMMERCIAL

## 2023-09-29 ENCOUNTER — RESULT REVIEW (OUTPATIENT)
Age: 38
End: 2023-09-29

## 2023-09-29 VITALS
BODY MASS INDEX: 32.16 KG/M2 | TEMPERATURE: 98.6 F | RESPIRATION RATE: 16 BRPM | HEART RATE: 105 BPM | DIASTOLIC BLOOD PRESSURE: 92 MMHG | SYSTOLIC BLOOD PRESSURE: 131 MMHG | OXYGEN SATURATION: 98 % | WEIGHT: 211.2 LBS

## 2023-09-29 LAB
BASOPHILS # BLD AUTO: 0.03 K/UL — SIGNIFICANT CHANGE UP (ref 0–0.2)
BASOPHILS NFR BLD AUTO: 0.4 % — SIGNIFICANT CHANGE UP (ref 0–2)
EOSINOPHIL # BLD AUTO: 0.15 K/UL — SIGNIFICANT CHANGE UP (ref 0–0.5)
EOSINOPHIL NFR BLD AUTO: 1.9 % — SIGNIFICANT CHANGE UP (ref 0–6)
HCT VFR BLD CALC: 44.3 % — SIGNIFICANT CHANGE UP (ref 39–50)
HGB BLD-MCNC: 15.4 G/DL — SIGNIFICANT CHANGE UP (ref 13–17)
IMM GRANULOCYTES NFR BLD AUTO: 0.4 % — SIGNIFICANT CHANGE UP (ref 0–0.9)
LYMPHOCYTES # BLD AUTO: 1.93 K/UL — SIGNIFICANT CHANGE UP (ref 1–3.3)
LYMPHOCYTES # BLD AUTO: 23.8 % — SIGNIFICANT CHANGE UP (ref 13–44)
MCHC RBC-ENTMCNC: 32.8 PG — SIGNIFICANT CHANGE UP (ref 27–34)
MCHC RBC-ENTMCNC: 34.8 G/DL — SIGNIFICANT CHANGE UP (ref 32–36)
MCV RBC AUTO: 94.3 FL — SIGNIFICANT CHANGE UP (ref 80–100)
MONOCYTES # BLD AUTO: 0.63 K/UL — SIGNIFICANT CHANGE UP (ref 0–0.9)
MONOCYTES NFR BLD AUTO: 7.8 % — SIGNIFICANT CHANGE UP (ref 2–14)
NEUTROPHILS # BLD AUTO: 5.33 K/UL — SIGNIFICANT CHANGE UP (ref 1.8–7.4)
NEUTROPHILS NFR BLD AUTO: 65.7 % — SIGNIFICANT CHANGE UP (ref 43–77)
NRBC # BLD: 0 /100 WBCS — SIGNIFICANT CHANGE UP (ref 0–0)
PLATELET # BLD AUTO: 180 K/UL — SIGNIFICANT CHANGE UP (ref 150–400)
RBC # BLD: 4.7 M/UL — SIGNIFICANT CHANGE UP (ref 4.2–5.8)
RBC # FLD: 12.4 % — SIGNIFICANT CHANGE UP (ref 10.3–14.5)
WBC # BLD: 8.1 K/UL — SIGNIFICANT CHANGE UP (ref 3.8–10.5)
WBC # FLD AUTO: 8.1 K/UL — SIGNIFICANT CHANGE UP (ref 3.8–10.5)

## 2023-09-29 PROCEDURE — 99215 OFFICE O/P EST HI 40 MIN: CPT

## 2023-09-30 LAB
ALBUMIN SERPL ELPH-MCNC: 5.1 G/DL
ALP BLD-CCNC: 128 U/L
ALT SERPL-CCNC: 23 U/L
ANION GAP SERPL CALC-SCNC: 14 MMOL/L
AST SERPL-CCNC: 17 U/L
BILIRUB SERPL-MCNC: 0.3 MG/DL
BUN SERPL-MCNC: 13 MG/DL
CALCIUM SERPL-MCNC: 10.1 MG/DL
CHLORIDE SERPL-SCNC: 102 MMOL/L
CO2 SERPL-SCNC: 23 MMOL/L
CREAT SERPL-MCNC: 0.85 MG/DL
EGFR: 116 ML/MIN/1.73M2
GLUCOSE SERPL-MCNC: 131 MG/DL
LDH SERPL-CCNC: 196 U/L
MAGNESIUM SERPL-MCNC: 1.7 MG/DL
POTASSIUM SERPL-SCNC: 4.2 MMOL/L
PROT SERPL-MCNC: 7.1 G/DL
SODIUM SERPL-SCNC: 139 MMOL/L
TACROLIMUS SERPL-MCNC: 6.2 NG/ML

## 2023-10-02 LAB
CMV DNA SPEC QL NAA+PROBE: NOT DETECTED IU/ML
CMVPCR LOG: NOT DETECTED LOG10IU/ML

## 2023-10-17 ENCOUNTER — RESULT REVIEW (OUTPATIENT)
Age: 38
End: 2023-10-17

## 2023-10-17 ENCOUNTER — APPOINTMENT (OUTPATIENT)
Dept: HEMATOLOGY ONCOLOGY | Facility: CLINIC | Age: 38
End: 2023-10-17
Payer: COMMERCIAL

## 2023-10-17 VITALS
RESPIRATION RATE: 16 BRPM | DIASTOLIC BLOOD PRESSURE: 89 MMHG | TEMPERATURE: 96.8 F | OXYGEN SATURATION: 98 % | BODY MASS INDEX: 32.33 KG/M2 | HEART RATE: 85 BPM | SYSTOLIC BLOOD PRESSURE: 131 MMHG | WEIGHT: 212.28 LBS

## 2023-10-17 DIAGNOSIS — Z87.891 PERSONAL HISTORY OF NICOTINE DEPENDENCE: ICD-10-CM

## 2023-10-17 DIAGNOSIS — Z87.09 PERSONAL HISTORY OF OTHER DISEASES OF THE RESPIRATORY SYSTEM: ICD-10-CM

## 2023-10-17 LAB
ALBUMIN SERPL ELPH-MCNC: 5 G/DL
ALP BLD-CCNC: 119 U/L
ALT SERPL-CCNC: 35 U/L
ANION GAP SERPL CALC-SCNC: 11 MMOL/L
AST SERPL-CCNC: 20 U/L
BASOPHILS # BLD AUTO: 0.02 K/UL — SIGNIFICANT CHANGE UP (ref 0–0.2)
BASOPHILS # BLD AUTO: 0.02 K/UL — SIGNIFICANT CHANGE UP (ref 0–0.2)
BASOPHILS NFR BLD AUTO: 0.3 % — SIGNIFICANT CHANGE UP (ref 0–2)
BASOPHILS NFR BLD AUTO: 0.3 % — SIGNIFICANT CHANGE UP (ref 0–2)
BILIRUB SERPL-MCNC: 0.3 MG/DL
BUN SERPL-MCNC: 13 MG/DL
CALCIUM SERPL-MCNC: 9.9 MG/DL
CHLORIDE SERPL-SCNC: 103 MMOL/L
CO2 SERPL-SCNC: 25 MMOL/L
CREAT SERPL-MCNC: 0.86 MG/DL
EGFR: 116 ML/MIN/1.73M2
EOSINOPHIL # BLD AUTO: 0.15 K/UL — SIGNIFICANT CHANGE UP (ref 0–0.5)
EOSINOPHIL # BLD AUTO: 0.15 K/UL — SIGNIFICANT CHANGE UP (ref 0–0.5)
EOSINOPHIL NFR BLD AUTO: 2.2 % — SIGNIFICANT CHANGE UP (ref 0–6)
EOSINOPHIL NFR BLD AUTO: 2.2 % — SIGNIFICANT CHANGE UP (ref 0–6)
GLUCOSE SERPL-MCNC: 105 MG/DL
HCT VFR BLD CALC: 44.3 % — SIGNIFICANT CHANGE UP (ref 39–50)
HCT VFR BLD CALC: 44.3 % — SIGNIFICANT CHANGE UP (ref 39–50)
HGB BLD-MCNC: 15.6 G/DL — SIGNIFICANT CHANGE UP (ref 13–17)
HGB BLD-MCNC: 15.6 G/DL — SIGNIFICANT CHANGE UP (ref 13–17)
IMM GRANULOCYTES NFR BLD AUTO: 0.4 % — SIGNIFICANT CHANGE UP (ref 0–0.9)
IMM GRANULOCYTES NFR BLD AUTO: 0.4 % — SIGNIFICANT CHANGE UP (ref 0–0.9)
LDH SERPL-CCNC: 191 U/L
LYMPHOCYTES # BLD AUTO: 1.88 K/UL — SIGNIFICANT CHANGE UP (ref 1–3.3)
LYMPHOCYTES # BLD AUTO: 1.88 K/UL — SIGNIFICANT CHANGE UP (ref 1–3.3)
LYMPHOCYTES # BLD AUTO: 28.1 % — SIGNIFICANT CHANGE UP (ref 13–44)
LYMPHOCYTES # BLD AUTO: 28.1 % — SIGNIFICANT CHANGE UP (ref 13–44)
MAGNESIUM SERPL-MCNC: 1.5 MG/DL
MCHC RBC-ENTMCNC: 32.8 PG — SIGNIFICANT CHANGE UP (ref 27–34)
MCHC RBC-ENTMCNC: 32.8 PG — SIGNIFICANT CHANGE UP (ref 27–34)
MCHC RBC-ENTMCNC: 35.2 G/DL — SIGNIFICANT CHANGE UP (ref 32–36)
MCHC RBC-ENTMCNC: 35.2 G/DL — SIGNIFICANT CHANGE UP (ref 32–36)
MCV RBC AUTO: 93.1 FL — SIGNIFICANT CHANGE UP (ref 80–100)
MCV RBC AUTO: 93.1 FL — SIGNIFICANT CHANGE UP (ref 80–100)
MONOCYTES # BLD AUTO: 0.66 K/UL — SIGNIFICANT CHANGE UP (ref 0–0.9)
MONOCYTES # BLD AUTO: 0.66 K/UL — SIGNIFICANT CHANGE UP (ref 0–0.9)
MONOCYTES NFR BLD AUTO: 9.9 % — SIGNIFICANT CHANGE UP (ref 2–14)
MONOCYTES NFR BLD AUTO: 9.9 % — SIGNIFICANT CHANGE UP (ref 2–14)
NEUTROPHILS # BLD AUTO: 3.94 K/UL — SIGNIFICANT CHANGE UP (ref 1.8–7.4)
NEUTROPHILS # BLD AUTO: 3.94 K/UL — SIGNIFICANT CHANGE UP (ref 1.8–7.4)
NEUTROPHILS NFR BLD AUTO: 59.1 % — SIGNIFICANT CHANGE UP (ref 43–77)
NEUTROPHILS NFR BLD AUTO: 59.1 % — SIGNIFICANT CHANGE UP (ref 43–77)
NRBC # BLD: 0 /100 WBCS — SIGNIFICANT CHANGE UP (ref 0–0)
NRBC # BLD: 0 /100 WBCS — SIGNIFICANT CHANGE UP (ref 0–0)
PLATELET # BLD AUTO: 179 K/UL — SIGNIFICANT CHANGE UP (ref 150–400)
PLATELET # BLD AUTO: 179 K/UL — SIGNIFICANT CHANGE UP (ref 150–400)
POTASSIUM SERPL-SCNC: 4.8 MMOL/L
PROT SERPL-MCNC: 6.9 G/DL
RBC # BLD: 4.76 M/UL — SIGNIFICANT CHANGE UP (ref 4.2–5.8)
RBC # BLD: 4.76 M/UL — SIGNIFICANT CHANGE UP (ref 4.2–5.8)
RBC # FLD: 11.9 % — SIGNIFICANT CHANGE UP (ref 10.3–14.5)
RBC # FLD: 11.9 % — SIGNIFICANT CHANGE UP (ref 10.3–14.5)
SODIUM SERPL-SCNC: 139 MMOL/L
TACROLIMUS SERPL-MCNC: 8.7 NG/ML
WBC # BLD: 6.68 K/UL — SIGNIFICANT CHANGE UP (ref 3.8–10.5)
WBC # BLD: 6.68 K/UL — SIGNIFICANT CHANGE UP (ref 3.8–10.5)
WBC # FLD AUTO: 6.68 K/UL — SIGNIFICANT CHANGE UP (ref 3.8–10.5)
WBC # FLD AUTO: 6.68 K/UL — SIGNIFICANT CHANGE UP (ref 3.8–10.5)

## 2023-10-17 PROCEDURE — 99215 OFFICE O/P EST HI 40 MIN: CPT

## 2023-10-18 LAB
CMV DNA SPEC QL NAA+PROBE: ABNORMAL IU/ML
CMVPCR LOG: ABNORMAL LOG10IU/ML

## 2023-10-25 NOTE — PROGRESS NOTE ADULT - PROBLEM SELECTOR PLAN 3
I received a call from the patient asking what time his colonoscopy is scheduled for on Friday. I informed him it is at 0730. I asked if he started the diet prep and med changes. He was unaware that he had to start the prep 5 days prior to procedure (per GI notes: There is a special diet you must start 5 days prior . You will find a copy of the procedure prep instructions and the diabetic medication instructions attached to this message. Please hold the Farxiga 4 days prior and Trulicity 7 days prior. )    I encouraged the patient to call GI as he would need to reschedule the procedure; he agreed. The patient reported his blood sugars are high when he eats but then stabilize. He stated he recently had a reading of 67 and was asymptomatic; he noted he ate candy which increased his blood sugar. The patient had a question regarding a medication but could not recall the name. I asked him to call back once he remembers. I will continue to follow. Pharmacologic DVT prophylaxis on hold due to thrombocytopenia    Contact: [phone] suspect retinal hemorrhage from thrombocytopenia  transfuse to keep PLTs above 50k for now  Please call Opthalmology consult in AM

## 2023-10-31 ENCOUNTER — APPOINTMENT (OUTPATIENT)
Dept: HEMATOLOGY ONCOLOGY | Facility: CLINIC | Age: 38
End: 2023-10-31

## 2023-11-07 ENCOUNTER — APPOINTMENT (OUTPATIENT)
Dept: HEMATOLOGY ONCOLOGY | Facility: CLINIC | Age: 38
End: 2023-11-07
Payer: COMMERCIAL

## 2023-11-07 ENCOUNTER — LABORATORY RESULT (OUTPATIENT)
Age: 38
End: 2023-11-07

## 2023-11-07 ENCOUNTER — RESULT REVIEW (OUTPATIENT)
Age: 38
End: 2023-11-07

## 2023-11-07 VITALS
OXYGEN SATURATION: 99 % | BODY MASS INDEX: 31.99 KG/M2 | DIASTOLIC BLOOD PRESSURE: 86 MMHG | RESPIRATION RATE: 16 BRPM | HEART RATE: 84 BPM | TEMPERATURE: 97 F | WEIGHT: 210.1 LBS | SYSTOLIC BLOOD PRESSURE: 148 MMHG

## 2023-11-07 LAB
ALBUMIN SERPL ELPH-MCNC: 4.8 G/DL
ALP BLD-CCNC: 126 U/L
ALT SERPL-CCNC: 29 U/L
ANION GAP SERPL CALC-SCNC: 12 MMOL/L
AST SERPL-CCNC: 16 U/L
BASOPHILS # BLD AUTO: 0.02 K/UL — SIGNIFICANT CHANGE UP (ref 0–0.2)
BASOPHILS # BLD AUTO: 0.02 K/UL — SIGNIFICANT CHANGE UP (ref 0–0.2)
BASOPHILS NFR BLD AUTO: 0.3 % — SIGNIFICANT CHANGE UP (ref 0–2)
BASOPHILS NFR BLD AUTO: 0.3 % — SIGNIFICANT CHANGE UP (ref 0–2)
BILIRUB SERPL-MCNC: 0.2 MG/DL
BUN SERPL-MCNC: 19 MG/DL
CALCIUM SERPL-MCNC: 9.5 MG/DL
CHLORIDE SERPL-SCNC: 101 MMOL/L
CO2 SERPL-SCNC: 25 MMOL/L
CREAT SERPL-MCNC: 1.02 MG/DL
EGFR: 98 ML/MIN/1.73M2
EOSINOPHIL # BLD AUTO: 0.17 K/UL — SIGNIFICANT CHANGE UP (ref 0–0.5)
EOSINOPHIL # BLD AUTO: 0.17 K/UL — SIGNIFICANT CHANGE UP (ref 0–0.5)
EOSINOPHIL NFR BLD AUTO: 2.6 % — SIGNIFICANT CHANGE UP (ref 0–6)
EOSINOPHIL NFR BLD AUTO: 2.6 % — SIGNIFICANT CHANGE UP (ref 0–6)
GLUCOSE SERPL-MCNC: 145 MG/DL
HCT VFR BLD CALC: 44.2 % — SIGNIFICANT CHANGE UP (ref 39–50)
HCT VFR BLD CALC: 44.2 % — SIGNIFICANT CHANGE UP (ref 39–50)
HGB BLD-MCNC: 16 G/DL — SIGNIFICANT CHANGE UP (ref 13–17)
HGB BLD-MCNC: 16 G/DL — SIGNIFICANT CHANGE UP (ref 13–17)
IMM GRANULOCYTES NFR BLD AUTO: 0.8 % — SIGNIFICANT CHANGE UP (ref 0–0.9)
IMM GRANULOCYTES NFR BLD AUTO: 0.8 % — SIGNIFICANT CHANGE UP (ref 0–0.9)
LDH SERPL-CCNC: 198 U/L
LYMPHOCYTES # BLD AUTO: 1.59 K/UL — SIGNIFICANT CHANGE UP (ref 1–3.3)
LYMPHOCYTES # BLD AUTO: 1.59 K/UL — SIGNIFICANT CHANGE UP (ref 1–3.3)
LYMPHOCYTES # BLD AUTO: 24.1 % — SIGNIFICANT CHANGE UP (ref 13–44)
LYMPHOCYTES # BLD AUTO: 24.1 % — SIGNIFICANT CHANGE UP (ref 13–44)
MAGNESIUM SERPL-MCNC: 1.7 MG/DL
MCHC RBC-ENTMCNC: 33.5 PG — SIGNIFICANT CHANGE UP (ref 27–34)
MCHC RBC-ENTMCNC: 33.5 PG — SIGNIFICANT CHANGE UP (ref 27–34)
MCHC RBC-ENTMCNC: 36.2 G/DL — HIGH (ref 32–36)
MCHC RBC-ENTMCNC: 36.2 G/DL — HIGH (ref 32–36)
MCV RBC AUTO: 92.7 FL — SIGNIFICANT CHANGE UP (ref 80–100)
MCV RBC AUTO: 92.7 FL — SIGNIFICANT CHANGE UP (ref 80–100)
MONOCYTES # BLD AUTO: 0.56 K/UL — SIGNIFICANT CHANGE UP (ref 0–0.9)
MONOCYTES # BLD AUTO: 0.56 K/UL — SIGNIFICANT CHANGE UP (ref 0–0.9)
MONOCYTES NFR BLD AUTO: 8.5 % — SIGNIFICANT CHANGE UP (ref 2–14)
MONOCYTES NFR BLD AUTO: 8.5 % — SIGNIFICANT CHANGE UP (ref 2–14)
NEUTROPHILS # BLD AUTO: 4.21 K/UL — SIGNIFICANT CHANGE UP (ref 1.8–7.4)
NEUTROPHILS # BLD AUTO: 4.21 K/UL — SIGNIFICANT CHANGE UP (ref 1.8–7.4)
NEUTROPHILS NFR BLD AUTO: 63.7 % — SIGNIFICANT CHANGE UP (ref 43–77)
NEUTROPHILS NFR BLD AUTO: 63.7 % — SIGNIFICANT CHANGE UP (ref 43–77)
NRBC # BLD: 0 /100 WBCS — SIGNIFICANT CHANGE UP (ref 0–0)
NRBC # BLD: 0 /100 WBCS — SIGNIFICANT CHANGE UP (ref 0–0)
PLATELET # BLD AUTO: 193 K/UL — SIGNIFICANT CHANGE UP (ref 150–400)
PLATELET # BLD AUTO: 193 K/UL — SIGNIFICANT CHANGE UP (ref 150–400)
POTASSIUM SERPL-SCNC: 4.8 MMOL/L
PROT SERPL-MCNC: 6.7 G/DL
RBC # BLD: 4.77 M/UL — SIGNIFICANT CHANGE UP (ref 4.2–5.8)
RBC # BLD: 4.77 M/UL — SIGNIFICANT CHANGE UP (ref 4.2–5.8)
RBC # FLD: 11.9 % — SIGNIFICANT CHANGE UP (ref 10.3–14.5)
RBC # FLD: 11.9 % — SIGNIFICANT CHANGE UP (ref 10.3–14.5)
SODIUM SERPL-SCNC: 138 MMOL/L
TACROLIMUS SERPL-MCNC: 8.8 NG/ML
WBC # BLD: 6.6 K/UL — SIGNIFICANT CHANGE UP (ref 3.8–10.5)
WBC # BLD: 6.6 K/UL — SIGNIFICANT CHANGE UP (ref 3.8–10.5)
WBC # FLD AUTO: 6.6 K/UL — SIGNIFICANT CHANGE UP (ref 3.8–10.5)
WBC # FLD AUTO: 6.6 K/UL — SIGNIFICANT CHANGE UP (ref 3.8–10.5)

## 2023-11-07 PROCEDURE — 38222 DX BONE MARROW BX & ASPIR: CPT | Mod: LT

## 2023-11-08 LAB
CMV DNA SPEC QL NAA+PROBE: NOT DETECTED IU/ML
CMVPCR LOG: NOT DETECTED LOG10IU/ML

## 2023-11-10 ENCOUNTER — APPOINTMENT (OUTPATIENT)
Dept: DERMATOLOGY | Facility: CLINIC | Age: 38
End: 2023-11-10
Payer: COMMERCIAL

## 2023-11-10 PROCEDURE — 11105 PUNCH BX SKIN EA SEP/ADDL: CPT

## 2023-11-10 PROCEDURE — 99204 OFFICE O/P NEW MOD 45 MIN: CPT | Mod: 25

## 2023-11-10 PROCEDURE — 11104 PUNCH BX SKIN SINGLE LESION: CPT

## 2023-11-10 RX ORDER — CLOBETASOL PROPIONATE 0.05 G/ML
0.05 SPRAY TOPICAL TWICE DAILY
Qty: 1 | Refills: 0 | Status: DISCONTINUED | COMMUNITY
Start: 2023-08-17 | End: 2023-11-10

## 2023-11-15 ENCOUNTER — OUTPATIENT (OUTPATIENT)
Dept: OUTPATIENT SERVICES | Facility: HOSPITAL | Age: 38
LOS: 1 days | Discharge: HOME | End: 2023-11-15

## 2023-11-15 DIAGNOSIS — D61.9 APLASTIC ANEMIA, UNSPECIFIED: ICD-10-CM

## 2023-11-15 DIAGNOSIS — Z98.890 OTHER SPECIFIED POSTPROCEDURAL STATES: Chronic | ICD-10-CM

## 2023-11-21 ENCOUNTER — RESULT REVIEW (OUTPATIENT)
Age: 38
End: 2023-11-21

## 2023-11-21 ENCOUNTER — APPOINTMENT (OUTPATIENT)
Dept: HEMATOLOGY ONCOLOGY | Facility: CLINIC | Age: 38
End: 2023-11-21
Payer: COMMERCIAL

## 2023-11-21 VITALS
SYSTOLIC BLOOD PRESSURE: 129 MMHG | DIASTOLIC BLOOD PRESSURE: 87 MMHG | RESPIRATION RATE: 16 BRPM | OXYGEN SATURATION: 98 % | WEIGHT: 209.88 LBS | HEART RATE: 74 BPM | BODY MASS INDEX: 31.96 KG/M2 | TEMPERATURE: 97.9 F

## 2023-11-21 LAB
BASOPHILS # BLD AUTO: 0.03 K/UL — SIGNIFICANT CHANGE UP (ref 0–0.2)
BASOPHILS # BLD AUTO: 0.03 K/UL — SIGNIFICANT CHANGE UP (ref 0–0.2)
BASOPHILS NFR BLD AUTO: 0.4 % — SIGNIFICANT CHANGE UP (ref 0–2)
BASOPHILS NFR BLD AUTO: 0.4 % — SIGNIFICANT CHANGE UP (ref 0–2)
EOSINOPHIL # BLD AUTO: 0.1 K/UL — SIGNIFICANT CHANGE UP (ref 0–0.5)
EOSINOPHIL # BLD AUTO: 0.1 K/UL — SIGNIFICANT CHANGE UP (ref 0–0.5)
EOSINOPHIL NFR BLD AUTO: 1.3 % — SIGNIFICANT CHANGE UP (ref 0–6)
EOSINOPHIL NFR BLD AUTO: 1.3 % — SIGNIFICANT CHANGE UP (ref 0–6)
HCT VFR BLD CALC: 44.7 % — SIGNIFICANT CHANGE UP (ref 39–50)
HCT VFR BLD CALC: 44.7 % — SIGNIFICANT CHANGE UP (ref 39–50)
HGB BLD-MCNC: 16.2 G/DL — SIGNIFICANT CHANGE UP (ref 13–17)
HGB BLD-MCNC: 16.2 G/DL — SIGNIFICANT CHANGE UP (ref 13–17)
IMM GRANULOCYTES NFR BLD AUTO: 0.9 % — SIGNIFICANT CHANGE UP (ref 0–0.9)
IMM GRANULOCYTES NFR BLD AUTO: 0.9 % — SIGNIFICANT CHANGE UP (ref 0–0.9)
LYMPHOCYTES # BLD AUTO: 2.67 K/UL — SIGNIFICANT CHANGE UP (ref 1–3.3)
LYMPHOCYTES # BLD AUTO: 2.67 K/UL — SIGNIFICANT CHANGE UP (ref 1–3.3)
LYMPHOCYTES # BLD AUTO: 33.4 % — SIGNIFICANT CHANGE UP (ref 13–44)
LYMPHOCYTES # BLD AUTO: 33.4 % — SIGNIFICANT CHANGE UP (ref 13–44)
MCHC RBC-ENTMCNC: 33.1 PG — SIGNIFICANT CHANGE UP (ref 27–34)
MCHC RBC-ENTMCNC: 33.1 PG — SIGNIFICANT CHANGE UP (ref 27–34)
MCHC RBC-ENTMCNC: 36.2 G/DL — HIGH (ref 32–36)
MCHC RBC-ENTMCNC: 36.2 G/DL — HIGH (ref 32–36)
MCV RBC AUTO: 91.2 FL — SIGNIFICANT CHANGE UP (ref 80–100)
MCV RBC AUTO: 91.2 FL — SIGNIFICANT CHANGE UP (ref 80–100)
MONOCYTES # BLD AUTO: 0.62 K/UL — SIGNIFICANT CHANGE UP (ref 0–0.9)
MONOCYTES # BLD AUTO: 0.62 K/UL — SIGNIFICANT CHANGE UP (ref 0–0.9)
MONOCYTES NFR BLD AUTO: 7.8 % — SIGNIFICANT CHANGE UP (ref 2–14)
MONOCYTES NFR BLD AUTO: 7.8 % — SIGNIFICANT CHANGE UP (ref 2–14)
NEUTROPHILS # BLD AUTO: 4.5 K/UL — SIGNIFICANT CHANGE UP (ref 1.8–7.4)
NEUTROPHILS # BLD AUTO: 4.5 K/UL — SIGNIFICANT CHANGE UP (ref 1.8–7.4)
NEUTROPHILS NFR BLD AUTO: 56.2 % — SIGNIFICANT CHANGE UP (ref 43–77)
NEUTROPHILS NFR BLD AUTO: 56.2 % — SIGNIFICANT CHANGE UP (ref 43–77)
NRBC # BLD: 0 /100 WBCS — SIGNIFICANT CHANGE UP (ref 0–0)
NRBC # BLD: 0 /100 WBCS — SIGNIFICANT CHANGE UP (ref 0–0)
PLATELET # BLD AUTO: 192 K/UL — SIGNIFICANT CHANGE UP (ref 150–400)
PLATELET # BLD AUTO: 192 K/UL — SIGNIFICANT CHANGE UP (ref 150–400)
RBC # BLD: 4.9 M/UL — SIGNIFICANT CHANGE UP (ref 4.2–5.8)
RBC # BLD: 4.9 M/UL — SIGNIFICANT CHANGE UP (ref 4.2–5.8)
RBC # FLD: 12.3 % — SIGNIFICANT CHANGE UP (ref 10.3–14.5)
RBC # FLD: 12.3 % — SIGNIFICANT CHANGE UP (ref 10.3–14.5)
WBC # BLD: 7.99 K/UL — SIGNIFICANT CHANGE UP (ref 3.8–10.5)
WBC # BLD: 7.99 K/UL — SIGNIFICANT CHANGE UP (ref 3.8–10.5)
WBC # FLD AUTO: 7.99 K/UL — SIGNIFICANT CHANGE UP (ref 3.8–10.5)
WBC # FLD AUTO: 7.99 K/UL — SIGNIFICANT CHANGE UP (ref 3.8–10.5)

## 2023-11-21 PROCEDURE — 99215 OFFICE O/P EST HI 40 MIN: CPT

## 2023-11-22 ENCOUNTER — APPOINTMENT (OUTPATIENT)
Dept: DERMATOLOGY | Facility: CLINIC | Age: 38
End: 2023-11-22
Payer: COMMERCIAL

## 2023-11-22 LAB
ALBUMIN SERPL ELPH-MCNC: 5.4 G/DL
ALP BLD-CCNC: 128 U/L
ALT SERPL-CCNC: 31 U/L
ANION GAP SERPL CALC-SCNC: 14 MMOL/L
AST SERPL-CCNC: 19 U/L
BILIRUB SERPL-MCNC: 0.4 MG/DL
BUN SERPL-MCNC: 16 MG/DL
CALCIUM SERPL-MCNC: 10.3 MG/DL
CHLORIDE SERPL-SCNC: 99 MMOL/L
CMV DNA SPEC QL NAA+PROBE: NOT DETECTED IU/ML
CMVPCR LOG: NOT DETECTED LOG10IU/ML
CO2 SERPL-SCNC: 23 MMOL/L
CREAT SERPL-MCNC: 0.83 MG/DL
EGFR: 117 ML/MIN/1.73M2
GLUCOSE SERPL-MCNC: 109 MG/DL
LDH SERPL-CCNC: 204 U/L
MAGNESIUM SERPL-MCNC: 1.8 MG/DL
POTASSIUM SERPL-SCNC: 4.4 MMOL/L
PROT SERPL-MCNC: 7.6 G/DL
SODIUM SERPL-SCNC: 136 MMOL/L
TACROLIMUS SERPL-MCNC: 7.8 NG/ML

## 2023-11-22 PROCEDURE — 99214 OFFICE O/P EST MOD 30 MIN: CPT

## 2023-12-07 RX ORDER — CLOBETASOL PROPIONATE 0.5 MG/G
0.05 OINTMENT TOPICAL TWICE DAILY
Qty: 1 | Refills: 0 | Status: ACTIVE | COMMUNITY
Start: 2023-11-10 | End: 1900-01-01

## 2023-12-19 ENCOUNTER — RESULT REVIEW (OUTPATIENT)
Age: 38
End: 2023-12-19

## 2023-12-19 ENCOUNTER — APPOINTMENT (OUTPATIENT)
Dept: HEMATOLOGY ONCOLOGY | Facility: CLINIC | Age: 38
End: 2023-12-19
Payer: COMMERCIAL

## 2023-12-19 VITALS
TEMPERATURE: 98 F | HEART RATE: 80 BPM | OXYGEN SATURATION: 98 % | RESPIRATION RATE: 16 BRPM | DIASTOLIC BLOOD PRESSURE: 87 MMHG | BODY MASS INDEX: 32.36 KG/M2 | SYSTOLIC BLOOD PRESSURE: 133 MMHG | WEIGHT: 212.5 LBS

## 2023-12-19 LAB
BASOPHILS # BLD AUTO: 0.03 K/UL — SIGNIFICANT CHANGE UP (ref 0–0.2)
BASOPHILS # BLD AUTO: 0.03 K/UL — SIGNIFICANT CHANGE UP (ref 0–0.2)
BASOPHILS NFR BLD AUTO: 0.4 % — SIGNIFICANT CHANGE UP (ref 0–2)
BASOPHILS NFR BLD AUTO: 0.4 % — SIGNIFICANT CHANGE UP (ref 0–2)
EOSINOPHIL # BLD AUTO: 0.12 K/UL — SIGNIFICANT CHANGE UP (ref 0–0.5)
EOSINOPHIL # BLD AUTO: 0.12 K/UL — SIGNIFICANT CHANGE UP (ref 0–0.5)
EOSINOPHIL NFR BLD AUTO: 1.7 % — SIGNIFICANT CHANGE UP (ref 0–6)
EOSINOPHIL NFR BLD AUTO: 1.7 % — SIGNIFICANT CHANGE UP (ref 0–6)
HCT VFR BLD CALC: 42.1 % — SIGNIFICANT CHANGE UP (ref 39–50)
HCT VFR BLD CALC: 42.1 % — SIGNIFICANT CHANGE UP (ref 39–50)
HGB BLD-MCNC: 15.2 G/DL — SIGNIFICANT CHANGE UP (ref 13–17)
HGB BLD-MCNC: 15.2 G/DL — SIGNIFICANT CHANGE UP (ref 13–17)
IMM GRANULOCYTES NFR BLD AUTO: 0.7 % — SIGNIFICANT CHANGE UP (ref 0–0.9)
IMM GRANULOCYTES NFR BLD AUTO: 0.7 % — SIGNIFICANT CHANGE UP (ref 0–0.9)
LYMPHOCYTES # BLD AUTO: 2.23 K/UL — SIGNIFICANT CHANGE UP (ref 1–3.3)
LYMPHOCYTES # BLD AUTO: 2.23 K/UL — SIGNIFICANT CHANGE UP (ref 1–3.3)
LYMPHOCYTES # BLD AUTO: 32.2 % — SIGNIFICANT CHANGE UP (ref 13–44)
LYMPHOCYTES # BLD AUTO: 32.2 % — SIGNIFICANT CHANGE UP (ref 13–44)
MCHC RBC-ENTMCNC: 33.5 PG — SIGNIFICANT CHANGE UP (ref 27–34)
MCHC RBC-ENTMCNC: 33.5 PG — SIGNIFICANT CHANGE UP (ref 27–34)
MCHC RBC-ENTMCNC: 36.1 G/DL — HIGH (ref 32–36)
MCHC RBC-ENTMCNC: 36.1 G/DL — HIGH (ref 32–36)
MCV RBC AUTO: 92.7 FL — SIGNIFICANT CHANGE UP (ref 80–100)
MCV RBC AUTO: 92.7 FL — SIGNIFICANT CHANGE UP (ref 80–100)
MONOCYTES # BLD AUTO: 0.7 K/UL — SIGNIFICANT CHANGE UP (ref 0–0.9)
MONOCYTES # BLD AUTO: 0.7 K/UL — SIGNIFICANT CHANGE UP (ref 0–0.9)
MONOCYTES NFR BLD AUTO: 10.1 % — SIGNIFICANT CHANGE UP (ref 2–14)
MONOCYTES NFR BLD AUTO: 10.1 % — SIGNIFICANT CHANGE UP (ref 2–14)
NEUTROPHILS # BLD AUTO: 3.8 K/UL — SIGNIFICANT CHANGE UP (ref 1.8–7.4)
NEUTROPHILS # BLD AUTO: 3.8 K/UL — SIGNIFICANT CHANGE UP (ref 1.8–7.4)
NEUTROPHILS NFR BLD AUTO: 54.9 % — SIGNIFICANT CHANGE UP (ref 43–77)
NEUTROPHILS NFR BLD AUTO: 54.9 % — SIGNIFICANT CHANGE UP (ref 43–77)
NRBC # BLD: 0 /100 WBCS — SIGNIFICANT CHANGE UP (ref 0–0)
NRBC # BLD: 0 /100 WBCS — SIGNIFICANT CHANGE UP (ref 0–0)
PLATELET # BLD AUTO: 192 K/UL — SIGNIFICANT CHANGE UP (ref 150–400)
PLATELET # BLD AUTO: 192 K/UL — SIGNIFICANT CHANGE UP (ref 150–400)
RBC # BLD: 4.54 M/UL — SIGNIFICANT CHANGE UP (ref 4.2–5.8)
RBC # BLD: 4.54 M/UL — SIGNIFICANT CHANGE UP (ref 4.2–5.8)
RBC # FLD: 12.2 % — SIGNIFICANT CHANGE UP (ref 10.3–14.5)
RBC # FLD: 12.2 % — SIGNIFICANT CHANGE UP (ref 10.3–14.5)
WBC # BLD: 6.93 K/UL — SIGNIFICANT CHANGE UP (ref 3.8–10.5)
WBC # BLD: 6.93 K/UL — SIGNIFICANT CHANGE UP (ref 3.8–10.5)
WBC # FLD AUTO: 6.93 K/UL — SIGNIFICANT CHANGE UP (ref 3.8–10.5)
WBC # FLD AUTO: 6.93 K/UL — SIGNIFICANT CHANGE UP (ref 3.8–10.5)

## 2023-12-19 PROCEDURE — 99215 OFFICE O/P EST HI 40 MIN: CPT

## 2023-12-20 ENCOUNTER — APPOINTMENT (OUTPATIENT)
Dept: DERMATOLOGY | Facility: CLINIC | Age: 38
End: 2023-12-20
Payer: COMMERCIAL

## 2023-12-20 PROCEDURE — 99213 OFFICE O/P EST LOW 20 MIN: CPT

## 2023-12-20 NOTE — HISTORY OF PRESENT ILLNESS
[de-identified] : 36 y/o M with no significant PMHx diagnosed with very severe aplastic anemia in April 2020. H confirmed on bone marrow biopsy at [facility] and now s/p inpatient treatment beginning 4/23/2020 with horse ATG + CsA + promacta with complete remission. Bone marrow bx on 11/9/20 revealed hypocellular marrow, overall cellularity improved (20-50%) showing marrow regeneration. BMBx on 5/18/2021 showing normal morphology with normal cellularity. Unfortunately patient relapsed in November 2021 with worsening cytopenias and neutropenic fever. He was admitted for treatment with rabbit ATG, prednisone, cyclosporine and Promacta with partial response  starting 12/27/21. 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 He reports doing well with ravulizumab and is able to continue working full time.       PMHX: Asthma    [de-identified] : This is a 37 year old male with severe aplastic anemia admitted for a haplo-identical bone marrow from his sister (6/12) with Flu / Cy / TBI prep regimen. Hematologic history as follows: initially diagnosed by bone marrow biopsy 4/20, began treatment 4/23/20 with horse ATG + CSA + promacta and achieved CR1. A bone marrow biopsy 11/9/20 showed hypocellular marrow, with overall improved cellularity showing marrow regeneration. Bone marrow biopsy 5/18/21 showed normal morphology and normal cellularity. He relapsed 11/2021, developing worsening cytopenias and neutropenic fevers. He was then treated with rabbit ATG, prednisone, CSA and promacta with partial response 12/27/21.  He later developed worsening hemolysis and pancytopenia, with a rising PNH clone and was treated with Ravulizumab.   Upon admission, a TLC was placed in IR. Mr. Puckett received IV hydration, pain management, nutritional support, and antibacterial / antiviral / antifungal / VOD / GI and PCP prophylaxis. Labs were monitored on a daily basis, and he received transfusional support and electrolyte repletion as needed. While admitted, Mr. Puckett experienced pancytopenia related to the high dose chemotherapy prep regimen. When he became neutropenic, he was started on prophylactic ciprofloxacin. He also had neutropenic fevers. When he became febrile, blood and urine cultures were sent, a CXR completed the ciprofloxacin was changed to cefepime.   On 6/8/23, after pre-medication Mr. Puckett received 548 mL of allogeneic, related, fresh, pooled, plasma reduced, haplo-identical HPC marrow for over approximately 90 min. Cell counts as follows  Total MNC( x10^8/kg)=21.08  CD34+cells ( x10^6 kg)=1.65  CD3+cells( x10^7)=2.94  Cell Viability (%)= 99  During the infusion Mr. Puckett developed hives and required additional dose  of benadryl and hydrocortisone injection.   Post transplant, on 6/12/23 he experienced a fever attributed to CRS post transplant. He was treated with a dose of tocilizumab. On 6/20/23, he developed clinical sinusitis, the cefepime was changed to zosyn. He completed a 10 day course. Pansinusitis was confirmed on a CT maxillofacial / orbit. Opthalmology was consulted on 6/21/23 for eye pain. Mr. Puckett also experienced chemotherapy induced diarrhea. C. diff was negative on 6/11/23.   Engraftment was noted on 6/26/23. The daily zarxio was discontinued on 6/30/23, as was the zosyn. Post engraftment a FISH for Y was sent to determine chimerism, results are pending. A CMV PCR was sent as well. Currently, Mr. Puckett is stable for discharge home with outpatient follow up at the [facility].   On 7/5/23 visit, day + 27 post Haplo identical bone marrow transplant from his sister on 6/8/23. Overall fatigued but, feels well overall. Felt bumps on bilateral hands over the weekend. Right index finger hyperpigmented. On Cellcept 1000 mg TID and FK 4.5 mg BID. Did not take FK prior to blood work today. Denies fever, chills, nausea, vomiting, diarrhea, rash, mouth sores, dysuria or any signs of active bleeding. Denies SOB, chest pain or B/L LE edema. Remains compliant with post transplant medications. Remains compliant with post transplant diet and crowd restrictions.   On 7/19/23 visit, day + 46 post Allogeneic PBSCT. On Cellcept 1000 mg QD and FK 4.5 mg BID. Overall well with no acute concerns. Complains of fatigue and pain behind the left eye. Denies any vision changes. Denies fever, chills, nausea, vomiting, diarrhea, rash, mouth sores, dysuria or any signs of active bleeding. Denies SOB, chest pain or B/L LE edema. Remains compliant with post transplant medications. Remains compliant with post transplant diet and crowd restrictions.   8/3/23:Today is Day + 60 post Allogeneic PBSCT.Denies fever, chills, nausea,vomiting, diarrhea, SOB,chest pain,edema, rash,mouth sores,dysuria or any signs of active bleeding.On FK 4.5mg 2 x day.He is off Cellcept since last week.Remains compliant with medications as prescribed. Remains compliant with diet and crowd restrictions. Describes occ double vision  8/11/23: Patient is seen for a follow up visit. Today is Day +64.Overall feels well with no acute concerns. Denies fever, chills,SOB,chest pain, edema, rash, mouth sores,nausea, vomiting, diarrhea or any signs of active bleeding. On FK 5mg 2 x day. Remains compliant with post transplantm edications. Remains compliant with diet and crowd restrictions.  On 9/7//23, patient presents for a follow up visit. Overall well and offers no acute concerns. Mild erythema of face resolved. On FK 5 mg BID. Denies fever, nausea, vomiting, diarrhea, mouth sores, dysuria or any signs of active bleeding. Denies SOB, chest pain or B/L LE edema.   9/29/23:Today is Day +113.Denies fever, chills, edema, SOB,chest pain,nausea,vomiting, diarrhea or any signs of active bleeding.On FK 5mg2 x day.Has mild resolving rash on forehead.Remains compliant with medications as prescribed.   10/17/23: Patient presents for a follow up visit. Overall, well and offers no acute concerns. Denies fever, chills, nausea, vomiting, diarrhea, rash, mouth sores or any signs of active bleeding. Denies SOB, chest pain or B/L LE edema. Patient reports dry skin, steroid cream helps.  11/21/23: Patient is here for a follow up visit. Denies fever, chills, nausea, vomiting, diarrhea, mouth sores or any signs of active bleeding. Denies SOB, chest pain or B/L LE edema. Patient had a rash throughout body, prescribed ointment helped. Patient complains of dry scalp.   12/19/23: Patient is seen for a follow up visit. Denies fever, chills, nausea, vomiting, diarrhea.mouthsores or any signs of active bleeding. He has healing rash on back and forehead.On FK 4mg BID.

## 2023-12-20 NOTE — ASSESSMENT
[FreeTextEntry1] : Aplastic anemia (284.9) (D61.9) S/P allogeneic bone marrow transplant (V42.81) (Z94.81) This is a 37 year old male with severe aplastic anemia admitted for a haplo-identical bone marrow from his sister (6/12) with Flu / Cy / TBI prep regimen. Hematologic history as follows: initially diagnosed by bone marrow biopsy 4/20, began treatment 4/23/20 with horse ATG + CSA + promacta and achieved CR1. A bone marrow biopsy 11/9/20 showed hypocellular marrow, with overall improved cellularity showing marrow regeneration. Bone marrow biopsy 5/18/21 showed normal morphology and normal cellularity. He relapsed 11/2021, developing worsening cytopenias and neutropenic fevers. He was then treated with rabbit ATG, prednisone, CSA and promacta with partial response 12/27/21. He later developed worsening hemolysis and pancytopenia, with a rising PNH clone and was treated with Ravulizumab. Status post Haplo identical bone marrow transplant from his sister on 6/8/23.  1) Severe aplastic anemia. -Status post Haplo identical bone marrow transplant from his sister on 6/8/23. -7/3/23: FISH for Y= 96% donor  7/10/23: FISH for Y = 99.5% donor 8/3/23: FISH for Y =100% donor 9/7/23:FISH for Y = 99% donor cells 9/29/23: FISH for Y = 98.5% 11/7/23: FISH for Y = 97.5% 11/21/23:FISH for Y = 97% donor cells  2) Heme Counts stable. No indication for transfusions today. Continue folic acid 1 mg oral tablet: 1 tab(s) orally once a day and Multiple Vitamins oral tablet: 1 tab(s) orally once a day.  3) ID Continue ppx: Acyclovir 400 mg oral tablet: 1 tab(s) orally every 12 hours Atovaquone 750 mg/5 mL oral suspension: 5 milliliter(s) orally 2 times a day Fluconazole 200 mg oral tablet: 2 tab(s) orally once a day- HOLD as of 7/10/23. Letermovir 480 mg oral tablet: 1 tab(s) orally once a day.  GVHD Skin 0 Liver 0 GI 0- overall grade 0 Has a resolving rash on forehead and mid back On FK 4 mg BID since 12/11/23.....will  decrease to 3 mg bid On Cellcept to 1000 mg BID. On 7/15/23, decrease cellcept to 1000 mg daily. OFF SINCE Week of 7/17/23 Reviewed signs and symptoms of GVHD.  7/5/23 CMV PCR negative. Continue to monitor weekly. 7/19/23:CMV PCR negative 8/3/23: CMV PCR-<34.5 IU/Ml 9/7/23:CMV PCR negative 11/21/23: CMV negative  4) GI Continue ppx: Ondansetron 8 mg oral tablet: 1 tab(s) orally every 8 hours as needed for nausea Pepcid 20 mg oral tablet: 1 tab(s) orally 2 times a day  Transaminitis: 7/3/23: AST 47, , Alk Phos 115. Continue to monitor HOLD fluconazole as of 7/10/23 7/27/23: AST 41  AlkPhos 106.....continue to HOLD Fluconazole Continue to HOLD Fluconazole  5) Other Continue: Magnesium oxide 400 mg oral tablet: 1 tab(s) orally 3 times a day (with meals) Ursodiol 300 mg oral capsule: 1 cap(s) orally 2 times a day Eye Pain / occ double vision- Continue to monitor. head ct ordered-RESOLVED Mild erythema of face- prescribed clobetasol on 8/17/23. resolved  6) Plan Continue post transplant diet and crowd restrictions. BMB Day 120-150 - trilineage hematopoiesis, fish normal Notify your physician if bleeding; swelling; persistent nausea and vomiting; unable to urinate; pain not relieved by medications; fever; numbness, tingling; excessive diarrhea, inability to tolerate liquids or foods; increased irritability or sluggishness, rash Discussed the tapering side effects of Tacrolimus and GVHD  I examined patient under Dr. Wilder's supervision and Dr. Wilder agrees to plan of care as listed above

## 2023-12-20 NOTE — END OF VISIT
[Time Spent: ___ minutes] : I have spent [unfilled] minutes of time on the encounter. [>50% of the face to face encounter time was spent on counseling and/or coordination of care for ___] : Greater than 50% of the face to face encounter time was spent on counseling and/or coordination of care for [unfilled] [FreeTextEntry2] :  Documented by Laine Hernandes acting as a scribe for Dr. Bella Wilder on 11/21/23. All medical record entries made by the Scribe were at my, Dr. Bella Wilder, direction and personally dictated by me on 11/21/23. I have reviewed the chart and agree that the record accurately reflects my personal performance of the history, physical exam, assessment and plan. I have also personally directed, reviewed, and agree with the discharge instructions.

## 2023-12-20 NOTE — PHYSICAL EXAM
[Restricted in physically strenuous activity but ambulatory and able to carry out work of a light or sedentary nature] : Status 1- Restricted in physically strenuous activity but ambulatory and able to carry out work of a light or sedentary nature, e.g., light house work, office work [Normal] : affect appropriate [Yes] : Yes [] :  [No active (erythematous_ GVHD rash)] : Skin: No active (erythematous_ GVHD rash) [< 2 mg/dl] : Liver: < 2 mg/dl [No or intermittent] : Upper GI: No or intermittent nausea, vomiting or anorexia [<500 ml/day or < 3 episodes/day] : Lower GI (stool output/day): <500 ml/day or <3 episodes/day [No] : No [de-identified] :  skin hyperpigmented..few acne like lesions on face, mild resolving rash of face, dry scalp [FreeTextEntry1] : 06/08/2023 [FreeTextEntry4] : Off Cellcept

## 2023-12-20 NOTE — REVIEW OF SYSTEMS
[Fatigue] : fatigue [Negative] : Allergic/Immunologic [Skin Rash] : skin rash [Fever] : no fever [Chills] : no chills [Night Sweats] : no night sweats [Recent Change In Weight] : ~T no recent weight change [Eye Pain] : no eye pain [Red Eyes] : eyes not red [Dry Eyes] : no dryness of the eyes [Vision Problems] : no vision problems [Skin Wound] : no skin wound [de-identified] :  Right index finger hyperpigmented, Mild resolving rash of forehead and mid back.

## 2023-12-22 LAB
ALBUMIN SERPL ELPH-MCNC: 5 G/DL
ALP BLD-CCNC: 123 U/L
ALT SERPL-CCNC: 29 U/L
ANION GAP SERPL CALC-SCNC: 11 MMOL/L
AST SERPL-CCNC: 15 U/L
BILIRUB SERPL-MCNC: 0.4 MG/DL
BUN SERPL-MCNC: 13 MG/DL
CALCIUM SERPL-MCNC: 10 MG/DL
CHLORIDE SERPL-SCNC: 103 MMOL/L
CMV DNA SPEC QL NAA+PROBE: NOT DETECTED IU/ML
CMVPCR LOG: NOT DETECTED LOG10IU/ML
CO2 SERPL-SCNC: 27 MMOL/L
CREAT SERPL-MCNC: 0.88 MG/DL
EGFR: 114 ML/MIN/1.73M2
GLUCOSE SERPL-MCNC: 117 MG/DL
LDH SERPL-CCNC: 181 U/L
MAGNESIUM SERPL-MCNC: 1.8 MG/DL
POTASSIUM SERPL-SCNC: 4.5 MMOL/L
PROT SERPL-MCNC: 7.1 G/DL
SODIUM SERPL-SCNC: 141 MMOL/L
TACROLIMUS SERPL-MCNC: 5.5 NG/ML

## 2024-01-18 RX ORDER — TACROLIMUS 0.5 MG/1
0.5 CAPSULE ORAL
Qty: 100 | Refills: 0 | Status: DISCONTINUED | COMMUNITY
Start: 2023-06-27 | End: 2024-01-18

## 2024-01-18 RX ORDER — MULTIVITAMIN WITH FOLIC ACID 400 MCG
TABLET ORAL DAILY
Qty: 30 | Refills: 3 | Status: ACTIVE | COMMUNITY
Start: 2023-07-05 | End: 1900-01-01

## 2024-01-22 NOTE — PROGRESS NOTE ADULT - NS ATTEND AMEND GEN_ALL_CORE FT
Diagnoses and all orders for this visit:    Segmental dysfunction of cervical region    thoracic back pain, unspecified chronicity - Bilateral    Segmental dysfunction of thoracic region    Segmental dysfunction of lumbar region    Segmental dysfunction of sacral region    Mechanical low back pain      ASSESSMENT:  Pt's symptoms and exam findings consistent with mechanical neck/ubp  and lower back secondary to repetitive st/sp injury, exacerbated by postural/ergonomic stressors. Pt responded well to stretches and manual mobilization of the affected spinal and myofascial tissues with increased ROM; trial of conservative tx recommended consisting of stretching, ther-ex, graded mobilization/manipulation of the affected spinal/myofascial tissues, postural/ergonomic education and take home stretches/exercises.  - Tolerated treatment well with a decrease in pain and mm spasm    PROCEDURE CODES: 74460 and 66467    TREATMENT:  Fear avoidance behavior discussion, encouraged and reassured pt that natural course of condition is to improve over time with adherence to tx plan and home care strategies. Home care recommendations: avoid bed rest, walk (but avoid trails and uneven surfaces), gradual return to activity to tolerance (avoid anything that peripheralizes symptoms), ice 20 min on/off, watch for ice burn, call if symptoms peripheralize, worsen, or neurologic deficit progresses. Ther-ex: IASTM - discussed post procedure soreness and/or ecchymosis for up to 36 hrs, applied to affected mm hypertonicities; wall magdalena, axial retraction, upper trap stretch, lev scap stretch, SCM stretch, lat rows with t-band, lat pull down with t-band, abdominal bracing; greater than 15 min spent performing above mentioned ther-ex. 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        Admit to BMTU  2. TLC placement in IR   3. Day -6 - day + 5 - antiemetics   4. Day - 6 start CTX hydration - 1/2NS @ 200ml /hr    5. Strict I&O, daily weights, prn diuresis   6. Day -6 - day -2 - Fludarabine 30mg/m2 IV  7. Day -6 - day - 5 - CTX 14.5mg/kg/day IV. CTX to start after urine SG < 1.010. Mesna  12mg / kg - hemorrhagic cystitis ppx   8. Day -2 - IVIG 0.4 g/kg (ideal body weight) every 3 weeks. Premedication with Tylenol and benadryl  9. Day -1 -  cGy x 1 dose   10. Day - 1 - at 2200 begin transplant hydration : 0.45Ns + 1 amp (50mEq) sodium bicarbonate at 150ml /hr; continue transplant hydration for 24 hours post infusion   11. Day 0 – HPC transplant   12. Day + 2 at 2200 - begin CTX hydration (0.45NS + 10 mEq KCl/ @150ml /m2  continue 24 hours post last dose of CTX   13. Day + 3 - + 4 - CTX 50mg/kg/day IV. Begin CTX when urine SG < 1.010. EKG daily. Mesna for hemorraghic cystitis ppx.   14. Day + 5 - start Zarxio,  MMF and Tacrolimus. Check Tacrolimus levels on Monday and Thursday.   15. Anxiolytics, antinausea, antidiarrhea medications as needed   16. Lasix PRN while being aggressively hydrated to avoid VOD   17. Nutritional support, pain management  Need for prophylactic measures:  1. VOD prophylaxis - low dose heparin gtt (dosed at 100 units / kg / day), glutamine supplementation, Actigall BID   2. PCP prophylaxis - Bactrim DS through day -2    3. Antiviral prophylaxis - Continue Acyclovir   4. Antifungal prophylaxis- Diflucan 400 mg po daily.  5.. GI prophylaxis - Protonix po QD   6. Antibacterial prophylaxis -  ANC < 500, started Cipro 500mg po BID. If becomes febrile, pan cx, CXR and change Cipro to Cefepime 2g IV q 8 hours. Continue until count recovery..slight rise in wbc noted after infusion of marrow  7. Aggressive mouth care and skin care as per protocol  8. GVHD prophylaxis- high dose CTX; MMF and Tacrolimus.  9. transfusion and electrolyte support    Patient with hemoglobin drop of ~2  on 6/4, likely due to chemotherapy however will repeat CBC tonight to establish stability, likely will require transfusion.  6/5 describes nausea  6/7 sister collected w/o complication  6/9 toci for haplo storm if t>102.5..will send blood cx on patient...marrow product cx positive for gram pos rods..likely contaminant  6/10 Afebrile, no new complaints.  6/11 Febrile to 100.6F. Cipro switched to cefepime. Infectious work up ordered.  6/12 day 4 ctx today...given toci for haplo storm..some loose bowel, cdiff neg  6/13 mmf, tacro and zarxio.  6/16 overall doing well..on tacro 2 mg..repeat on monday..goal 5 to 10.   6/21- patient developed significant acute left sided facial pressure/pain in sinus region. During last hospitalization he had same symptoms diagnosed as pansinusitis and treated with Unasyn. Will broaden antibiotic coverage to Zosyn IV on 6/20/23; continue Acyclovir, Diflucan. If worsening or persistent symptoms will order CT of sinuses.  6/23- CT maxillofacial(6/22)- Pansinusitis, severe in the left maxillary and ethmoid sinuses- continue Zosyn IV.    Risk Statement (NON-critical care).     On this date of service, level of risk to patient is considered: High.     Due to: Patient has aplastic anemia and is being treated with haplo-SCT, which carries a risk for infection, bleeding, and other life-threatening complications. y.o. female   Chief Complaint   Patient presents with   • Back Pain     Back pain-4   • Shoulder Pain     Shoulder pain-4     The patient presents to the office with complaints of mid back and lower back pain, mainly on the R in mid back. 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 She reports her lower back always feels tight along with her hips but only sometimes becomes painful. She has regularly been treated b Mary her [facility]  at her school and who referred her to our office. The patient does report stress id higher since she is looking a senior and looking and applying to colleges for the dance program.Previous treatments included an adjustment only Chiropractor which helped temporarily.  10/27- The patient is feeling the same today but has a good day, a bad day and then 4 days without thinking about the pain so much.   11/6- The patient feeling a little better today but still pain and tightness in the same areas.  11/17- The patient is feeling worse with a very physical work with increased car rides for college tours.   12/1- The patient is feeling more soreness in the neck at today's appt, from dancing but she has improvements since last visit through her performances.   12/15- The aptient felt pretty good through the week until Wed when she had a ballet private and not pretty sore. 6-8/10.  1/8- The aptietn is feeling a little very sore today, she got it worked on by Mary the  and it helped. Most pain is lower R back and L upper back  1/22- The patient reports R upper back and SHARRON lower back pain.     Back Pain  This is a chronic problem. The current episode started more than 1 year ago. The problem occurs 2 to 4 times per day. The problem has been waxing and waning since onset. The pain is present in the lumbar spine, thoracic spine and gluteal. The pain is at a severity of 4/10. The symptoms are aggravated  by sitting. She has tried home exercises and chiropractic manipulation for the symptoms. The treatment provided moderate relief.   Shoulder Pain       Past Medical History:   Diagnosis Date   • Acute otitis media, right 9/30/2019   • Environmental and seasonal allergies    • Self-injurious behavior 2/15/2019   • Sinus congestion 9/30/2019      The following portions of the patient's history were reviewed and updated as appropriate: allergies, past family history, past medical history, past social history, past surgical history, and problem list.  Review of Systems   Musculoskeletal:  Positive for back pain.     Physical Exam  Musculoskeletal:      Cervical back: Spasms and tenderness present. No swelling, edema, deformity, erythema, signs of trauma, lacerations, torticollis, bony tenderness or crepitus. Pain with movement present. Decreased range of motion.      Thoracic back: Spasms and tenderness present. No swelling, edema, deformity, signs of trauma, lacerations or bony tenderness. Decreased range of motion. No scoliosis.      Lumbar back: Spasms and tenderness present. No swelling, edema, deformity, signs of trauma, lacerations or bony tenderness. Decreased range of motion. Negative right straight leg raise test and negative left straight leg raise test. No scoliosis.        Back:       Right lower leg: Normal. No swelling, deformity, lacerations, tenderness or bony tenderness.      Left lower leg: Normal. No swelling, deformity, lacerations, tenderness or bony tenderness.        Legs:        SOFT TISSUE ASSESSMENT: Hypertonicity and tenderness palpated C5-T12  L3-S1 erector spinae, upper traps, lev scap, SCM, scalene, rhomboid, suboccipitals JOINT RECTRICTIONS: C5-T10 L3-S1 ORTHO: Nayeli unremarkable for centralization/peripheralization; max foraminal comp elicits local np R/L; shoulder depression elicits stiffness in R/L upper trap; brachial plexus tension test elicits neural tension in R/L UE; cervical  distraction elicits relieves CC, SLR- Neg, Fabere- neg, SLUMP- Neg, Sitting ROOT- neg    Return in about 1 week (around 1/29/2024) for Recheck.     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        Admit to BMTU  2. TLC placement in IR   3. Day -6 - day + 5 - antiemetics   4. Day - 6 start CTX hydration - 1/2NS @ 200ml /hr    5. Strict I&O, daily weights, prn diuresis   6. Day -6 - day -2 - Fludarabine 30mg/m2 IV  7. Day -6 - day - 5 - CTX 14.5mg/kg/day IV. CTX to start after urine SG < 1.010. Mesna  12mg / kg - hemorrhagic cystitis ppx   8. Day -2 - IVIG 0.4 g/kg (ideal body weight) every 3 weeks. Premedication with Tylenol and benadryl  9. Day -1 -  cGy x 1 dose   10. Day - 1 - at 2200 begin transplant hydration : 0.45Ns + 1 amp (50mEq) sodium bicarbonate at 150ml /hr; continue transplant hydration for 24 hours post infusion   11. Day 0 – HPC transplant   12. Day + 2 at 2200 - begin CTX hydration (0.45NS + 10 mEq KCl/ @150ml /m2  continue 24 hours post last dose of CTX   13. Day + 3 - + 4 - CTX 50mg/kg/day IV. Begin CTX when urine SG < 1.010. EKG daily. Mesna for hemorraghic cystitis ppx.   14. Day + 5 - start Zarxio,  MMF and Tacrolimus. Check Tacrolimus levels on Monday and Thursday.   15. Anxiolytics, antinausea, antidiarrhea medications as needed   16. Lasix PRN while being aggressively hydrated to avoid VOD   17. Nutritional support, pain management  Need for prophylactic measures:  1. VOD prophylaxis - low dose heparin gtt (dosed at 100 units / kg / day), glutamine supplementation, Actigall BID   2. PCP prophylaxis - Bactrim DS through day -2    3. Antiviral prophylaxis - Continue Acyclovir   4. Antifungal prophylaxis- Diflucan 400 mg po daily.  5.. GI prophylaxis - Protonix po QD   6. Antibacterial prophylaxis -  ANC < 500, started Cipro 500mg po BID. If becomes febrile, pan cx, CXR and change Cipro to Cefepime 2g IV q 8 hours. Continue until count recovery..slight rise in wbc noted after infusion of marrow  7. Aggressive mouth care and skin care as per protocol  8. GVHD prophylaxis- high dose CTX; MMF and Tacrolimus.  9. transfusion and electrolyte support    Patient with hemoglobin drop of ~2  on 6/4, likely due to chemotherapy however will repeat CBC tonight to establish stability, likely will require transfusion.  6/5 describes nausea  6/7 sister collected w/o complication  6/9 toci for haplo storm if t>102.5..will send blood cx on patient...marrow product cx positive for gram pos rods..likely contaminant  6/10 Afebrile, no new complaints.  6/11 Febrile to 100.6F. Cipro switched to cefepime. Infectious work up ordered.  6/12 day 4 ctx today...given toci for haplo storm..some loose bowel, cdiff neg  6/13 mmf, tacro and zarxio.  6/16 overall doing well..on tacro 2 mg..repeat on monday..goal 5 to 10.   6/21- patient developed significant acute left sided facial pressure/pain in sinus region. During last hospitalization he had same symptoms diagnosed as pansinusitis and treated with Unasyn. Will broaden antibiotic coverage to Zosyn IV on 6/20/23; continue Acyclovir, Diflucan. If worsening or persistent symptoms will order CT of sinuses.  6/23- CT maxillofacial(6/22)- Pansinusitis, severe in the left maxillary and ethmoid sinuses- continue Zosyn IV.  6/30- complete course of Zosyn IV for pansinusitis today; continue Acyclovir/Diflucan prophylaxis. On GVDH prophylaxis-  Tacro/MMF. No clinical evidence of aGVHD. Patient afebrile, not neutropenic. Discharge to home today and followup at [facility] as directed.     Risk Statement (NON-critical care).     On this date of service, level of risk to patient is considered: High.     Due to: Patient has aplastic anemia and is being treated with haplo-SCT, which carries a risk for infection, bleeding, and other life-threatening complications.

## 2024-01-29 ENCOUNTER — OUTPATIENT (OUTPATIENT)
Dept: OUTPATIENT SERVICES | Facility: HOSPITAL | Age: 39
LOS: 1 days | Discharge: HOME | End: 2024-01-29

## 2024-01-29 DIAGNOSIS — D61.9 APLASTIC ANEMIA, UNSPECIFIED: ICD-10-CM

## 2024-01-29 DIAGNOSIS — Z98.890 OTHER SPECIFIED POSTPROCEDURAL STATES: Chronic | ICD-10-CM

## 2024-01-30 ENCOUNTER — RESULT REVIEW (OUTPATIENT)
Age: 39
End: 2024-01-30

## 2024-01-30 ENCOUNTER — APPOINTMENT (OUTPATIENT)
Dept: HEMATOLOGY ONCOLOGY | Facility: CLINIC | Age: 39
End: 2024-01-30

## 2024-01-30 ENCOUNTER — APPOINTMENT (OUTPATIENT)
Dept: HEMATOLOGY ONCOLOGY | Facility: CLINIC | Age: 39
End: 2024-01-30
Payer: COMMERCIAL

## 2024-01-30 VITALS
HEART RATE: 94 BPM | TEMPERATURE: 98.2 F | DIASTOLIC BLOOD PRESSURE: 77 MMHG | OXYGEN SATURATION: 98 % | SYSTOLIC BLOOD PRESSURE: 147 MMHG | RESPIRATION RATE: 16 BRPM | BODY MASS INDEX: 32.33 KG/M2 | WEIGHT: 212.28 LBS

## 2024-01-30 LAB
ALBUMIN SERPL ELPH-MCNC: 4.8 G/DL
ALP BLD-CCNC: 116 U/L
ALT SERPL-CCNC: 31 U/L
ANION GAP SERPL CALC-SCNC: 12 MMOL/L
AST SERPL-CCNC: 22 U/L
BASOPHILS # BLD AUTO: 0.03 K/UL — SIGNIFICANT CHANGE UP (ref 0–0.2)
BASOPHILS NFR BLD AUTO: 0.4 % — SIGNIFICANT CHANGE UP (ref 0–2)
BILIRUB SERPL-MCNC: 0.2 MG/DL
BUN SERPL-MCNC: 17 MG/DL
CALCIUM SERPL-MCNC: 9.8 MG/DL
CHLORIDE SERPL-SCNC: 101 MMOL/L
CO2 SERPL-SCNC: 25 MMOL/L
CREAT SERPL-MCNC: 0.86 MG/DL
EGFR: 115 ML/MIN/1.73M2
EOSINOPHIL # BLD AUTO: 0.12 K/UL — SIGNIFICANT CHANGE UP (ref 0–0.5)
EOSINOPHIL NFR BLD AUTO: 1.5 % — SIGNIFICANT CHANGE UP (ref 0–6)
GLUCOSE SERPL-MCNC: 103 MG/DL
HCT VFR BLD CALC: 44.1 % — SIGNIFICANT CHANGE UP (ref 39–50)
HGB BLD-MCNC: 15.8 G/DL — SIGNIFICANT CHANGE UP (ref 13–17)
IMM GRANULOCYTES NFR BLD AUTO: 0.5 % — SIGNIFICANT CHANGE UP (ref 0–0.9)
LDH SERPL-CCNC: 199 U/L
LYMPHOCYTES # BLD AUTO: 2.17 K/UL — SIGNIFICANT CHANGE UP (ref 1–3.3)
LYMPHOCYTES # BLD AUTO: 27.5 % — SIGNIFICANT CHANGE UP (ref 13–44)
MAGNESIUM SERPL-MCNC: 2 MG/DL
MCHC RBC-ENTMCNC: 32.8 PG — SIGNIFICANT CHANGE UP (ref 27–34)
MCHC RBC-ENTMCNC: 35.8 G/DL — SIGNIFICANT CHANGE UP (ref 32–36)
MCV RBC AUTO: 91.5 FL — SIGNIFICANT CHANGE UP (ref 80–100)
MONOCYTES # BLD AUTO: 0.61 K/UL — SIGNIFICANT CHANGE UP (ref 0–0.9)
MONOCYTES NFR BLD AUTO: 7.7 % — SIGNIFICANT CHANGE UP (ref 2–14)
NEUTROPHILS # BLD AUTO: 4.92 K/UL — SIGNIFICANT CHANGE UP (ref 1.8–7.4)
NEUTROPHILS NFR BLD AUTO: 62.4 % — SIGNIFICANT CHANGE UP (ref 43–77)
NRBC # BLD: 0 /100 WBCS — SIGNIFICANT CHANGE UP (ref 0–0)
PLATELET # BLD AUTO: 214 K/UL — SIGNIFICANT CHANGE UP (ref 150–400)
POTASSIUM SERPL-SCNC: 4.4 MMOL/L
PROT SERPL-MCNC: 6.9 G/DL
RBC # BLD: 4.82 M/UL — SIGNIFICANT CHANGE UP (ref 4.2–5.8)
RBC # FLD: 12.1 % — SIGNIFICANT CHANGE UP (ref 10.3–14.5)
SODIUM SERPL-SCNC: 138 MMOL/L
TACROLIMUS SERPL-MCNC: 3.6 NG/ML
WBC # BLD: 7.89 K/UL — SIGNIFICANT CHANGE UP (ref 3.8–10.5)
WBC # FLD AUTO: 7.89 K/UL — SIGNIFICANT CHANGE UP (ref 3.8–10.5)

## 2024-01-30 PROCEDURE — 99214 OFFICE O/P EST MOD 30 MIN: CPT

## 2024-01-31 LAB
CMV DNA SPEC QL NAA+PROBE: NOT DETECTED IU/ML
CMVPCR LOG: NOT DETECTED LOG10IU/ML

## 2024-02-05 NOTE — PHYSICAL EXAM
[Restricted in physically strenuous activity but ambulatory and able to carry out work of a light or sedentary nature] : Status 1- Restricted in physically strenuous activity but ambulatory and able to carry out work of a light or sedentary nature, e.g., light house work, office work [Normal] : affect appropriate [Yes] : Yes [] :  [No active (erythematous_ GVHD rash)] : Skin: No active (erythematous_ GVHD rash) [< 2 mg/dl] : Liver: < 2 mg/dl [<500 ml/day or < 3 episodes/day] : Lower GI (stool output/day): <500 ml/day or <3 episodes/day [No] : No [de-identified] :  skin hyperpigmented..few acne like lesions on face, mild resolving rash of face, dry scalp [Maculopapular rash < 25% BSA] : Skin: Maculopapular rash < 25% BSA [No or intermittent] : Upper GI: No or intermittent nausea, vomiting or anorexia [I] : I [FreeTextEntry1] : 06/08/2023 [FreeTextEntry4] : Off Cellcept

## 2024-02-05 NOTE — ADDENDUM
[FreeTextEntry1] :  Documented by Laine Hernandes acting as a scribe for Dr. Bella Wilder on 1/30/24. All medical record entries made by the Scribe were at my, Dr. Bella Wilder, direction and personally dictated by me on 1/30/24. I have reviewed the chart and agree that the record accurately reflects my personal performance of the history, physical exam, assessment and plan. I have also personally directed, reviewed, and agree with the discharge instructions.

## 2024-02-05 NOTE — ASSESSMENT
[FreeTextEntry1] : Aplastic anemia (284.9) (D61.9) S/P allogeneic bone marrow transplant (V42.81) (Z94.81) This is a 37 year old male with severe aplastic anemia admitted for a haplo-identical bone marrow from his sister (6/12) with Flu / Cy / TBI prep regimen. Hematologic history as follows: initially diagnosed by bone marrow biopsy 4/20, began treatment 4/23/20 with horse ATG + CSA + promacta and achieved CR1. A bone marrow biopsy 11/9/20 showed hypocellular marrow, with overall improved cellularity showing marrow regeneration. Bone marrow biopsy 5/18/21 showed normal morphology and normal cellularity. He relapsed 11/2021, developing worsening cytopenias and neutropenic fevers. He was then treated with rabbit ATG, prednisone, CSA and promacta with partial response 12/27/21. He later developed worsening hemolysis and pancytopenia, with a rising PNH clone and was treated with Ravulizumab. Status post Haplo identical bone marrow transplant from his sister on 6/8/23.  1) Severe aplastic anemia. -Status post Haplo identical bone marrow transplant from his sister on 6/8/23. -7/3/23: FISH for Y= 96% donor  7/10/23: FISH for Y = 99.5% donor 8/3/23: FISH for Y =100% donor 9/7/23:FISH for Y = 99% donor cells 9/29/23: FISH for Y = 98.5% 11/7/23: FISH for Y = 97.5% 11/21/23:FISH for Y = 97% donor cells 12/19/23: FISH for Y = 97.5%  2) Heme Counts stable. No indication for transfusions today. Can stop folic acid 1 mg oral tablet: 1 tab(s) orally once a day and Multiple Vitamins oral tablet: 1 tab(s) orally once a day.  3) ID Continue ppx: Acyclovir 400 mg oral tablet: 1 tab(s) orally every 12 hours Atovaquone 750 mg/5 mL oral suspension: 5 milliliter(s) orally 2 times a day Fluconazole 200 mg oral tablet: 2 tab(s) orally once a day- HOLD as of 7/10/23. Letermovir 480 mg oral tablet: 1 tab(s) orally once a day. - can STOP when finishes supply  GVHD Skin 1 Liver 0 GI 0- overall grade 0...overall 1 Rash on forehead and mid back .. sees dermatologist..follow closely On FK 4 mg BID since 12/11/23.....will decrease to 3 mg bid ... 1/30/24 will decrease to 2.5 mg bid On Cellcept to 1000 mg BID. On 7/15/23, decrease cellcept to 1000 mg daily. OFF SINCE Week of 7/17/23 Reviewed signs and symptoms of GVHD.  7/5/23 CMV PCR negative. Continue to monitor weekly. 7/19/23:CMV PCR negative 8/3/23: CMV PCR-<34.5 IU/Ml 9/7/23:CMV PCR negative 11/21/23: CMV negative  4) GI Continue ppx: Ondansetron 8 mg oral tablet: 1 tab(s) orally every 8 hours as needed for nausea Pepcid 20 mg oral tablet: 1 tab(s) orally 2 times a day  Transaminitis: 7/3/23: AST 47, , Alk Phos 115. Continue to monitor HOLD fluconazole as of 7/10/23 7/27/23: AST 41  AlkPhos 106.....continue to HOLD Fluconazole Continue to HOLD Fluconazole  5) Other Continue: Magnesium oxide 400 mg oral tablet: 1 tab(s) orally 3 times a day (with meals) Ursodiol 300 mg oral capsule: 1 cap(s) orally 2 times a day Eye Pain / occ double vision- Continue to monitor. head ct ordered-RESOLVED Mild erythema of face- prescribed clobetasol on 8/17/23. resolved...1/30/24 does wax and wane  6) Plan Continue post transplant diet and crowd restrictions. BMB Day 120-150 - trilineage hematopoiesis, fish normal Notify your physician if bleeding; swelling; persistent nausea and vomiting; unable to urinate; pain not relieved by medications; fever; numbness, tingling; excessive diarrhea, inability to tolerate liquids or foods; increased irritability or sluggishness, rash Recommend Claritin or Zyrtec for rash Recommend pt to see ophthalmologist for routine check up post transplant  Will order repeat lung function test Discussed the tapering side effects of Tacrolimus and GVHD Follow up in 8 weeks with Dr. Wilder

## 2024-02-05 NOTE — ED PROVIDER NOTE - CRTICAL CARE TIME SPENT (MIN)
Called for pt in lobby with no answer  
Chief Complaint   Patient presents with    Shoulder Pain     Patient with right shoulder pain x 2 weeks. Seen at [facility] today and had xrays. Patient endorses being told to come to ER based on xray.         Patient educated on triage process. Informed to notified ED staff of change in symptoms.     Vitals:    02/05/24 1220   BP: (!) 180/111   Pulse: 84   Resp: 14   Temp: 36.6 °C (97.9 °F)   SpO2: 95%           
40

## 2024-02-05 NOTE — REVIEW OF SYSTEMS
[Fatigue] : fatigue [Skin Rash] : skin rash [Negative] : Allergic/Immunologic [Fever] : no fever [Chills] : no chills [Night Sweats] : no night sweats [Recent Change In Weight] : ~T no recent weight change [Eye Pain] : no eye pain [Red Eyes] : eyes not red [Dry Eyes] : no dryness of the eyes [Vision Problems] : no vision problems [Skin Wound] : no skin wound [de-identified] :  Right index finger hyperpigmented, Mild resolving rash of forehead and mid back.

## 2024-02-05 NOTE — HISTORY OF PRESENT ILLNESS
[de-identified] : 36 y/o M with no significant PMHx diagnosed with very severe aplastic anemia in April 2020. H confirmed on bone marrow biopsy at [facility] and now s/p inpatient treatment beginning 4/23/2020 with horse ATG + CsA + promacta with complete remission. Bone marrow bx on 11/9/20 revealed hypocellular marrow, overall cellularity improved (20-50%) showing marrow regeneration. BMBx on 5/18/2021 showing normal morphology with normal cellularity. Unfortunately patient relapsed in November 2021 with worsening cytopenias and neutropenic fever. He was admitted for treatment with rabbit ATG, prednisone, cyclosporine and Promacta with partial response  starting 12/27/21. 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 He reports doing well with ravulizumab and is able to continue working full time.       PMHX: Asthma    [de-identified] : This is a 37 year old male with severe aplastic anemia admitted for a haplo-identical bone marrow from his sister (6/12) with Flu / Cy / TBI prep regimen. Hematologic history as follows: initially diagnosed by bone marrow biopsy 4/20, began treatment 4/23/20 with horse ATG + CSA + promacta and achieved CR1. A bone marrow biopsy 11/9/20 showed hypocellular marrow, with overall improved cellularity showing marrow regeneration. Bone marrow biopsy 5/18/21 showed normal morphology and normal cellularity. He relapsed 11/2021, developing worsening cytopenias and neutropenic fevers. He was then treated with rabbit ATG, prednisone, CSA and promacta with partial response 12/27/21.  He later developed worsening hemolysis and pancytopenia, with a rising PNH clone and was treated with Ravulizumab.   Upon admission, a TLC was placed in IR. Mr. Puckett received IV hydration, pain management, nutritional support, and antibacterial / antiviral / antifungal / VOD / GI and PCP prophylaxis. Labs were monitored on a daily basis, and he received transfusional support and electrolyte repletion as needed. While admitted, Mr. Puckett experienced pancytopenia related to the high dose chemotherapy prep regimen. When he became neutropenic, he was started on prophylactic ciprofloxacin. He also had neutropenic fevers. When he became febrile, blood and urine cultures were sent, a CXR completed the ciprofloxacin was changed to cefepime.   On 6/8/23, after pre-medication Mr. Puckett received 548 mL of allogeneic, related, fresh, pooled, plasma reduced, haplo-identical HPC marrow for over approximately 90 min. Cell counts as follows  Total MNC( x10^8/kg)=21.08  CD34+cells ( x10^6 kg)=1.65  CD3+cells( x10^7)=2.94  Cell Viability (%)= 99  During the infusion Mr. Puckett developed hives and required additional dose  of benadryl and hydrocortisone injection.   Post transplant, on 6/12/23 he experienced a fever attributed to CRS post transplant. He was treated with a dose of tocilizumab. On 6/20/23, he developed clinical sinusitis, the cefepime was changed to zosyn. He completed a 10 day course. Pansinusitis was confirmed on a CT maxillofacial / orbit. Opthalmology was consulted on 6/21/23 for eye pain. Mr. Puckett also experienced chemotherapy induced diarrhea. C. diff was negative on 6/11/23.   Engraftment was noted on 6/26/23. The daily zarxio was discontinued on 6/30/23, as was the zosyn. Post engraftment a FISH for Y was sent to determine chimerism, results are pending. A CMV PCR was sent as well. Currently, Mr. Puckett is stable for discharge home with outpatient follow up at the [facility].   On 7/5/23 visit, day + 27 post Haplo identical bone marrow transplant from his sister on 6/8/23. Overall fatigued but, feels well overall. Felt bumps on bilateral hands over the weekend. Right index finger hyperpigmented. On Cellcept 1000 mg TID and FK 4.5 mg BID. Did not take FK prior to blood work today. Denies fever, chills, nausea, vomiting, diarrhea, rash, mouth sores, dysuria or any signs of active bleeding. Denies SOB, chest pain or B/L LE edema. Remains compliant with post transplant medications. Remains compliant with post transplant diet and crowd restrictions.   On 7/19/23 visit, day + 46 post Allogeneic PBSCT. On Cellcept 1000 mg QD and FK 4.5 mg BID. Overall well with no acute concerns. Complains of fatigue and pain behind the left eye. Denies any vision changes. Denies fever, chills, nausea, vomiting, diarrhea, rash, mouth sores, dysuria or any signs of active bleeding. Denies SOB, chest pain or B/L LE edema. Remains compliant with post transplant medications. Remains compliant with post transplant diet and crowd restrictions.   8/3/23:Today is Day + 60 post Allogeneic PBSCT.Denies fever, chills, nausea,vomiting, diarrhea, SOB,chest pain,edema, rash,mouth sores,dysuria or any signs of active bleeding.On FK 4.5mg 2 x day.He is off Cellcept since last week.Remains compliant with medications as prescribed. Remains compliant with diet and crowd restrictions. Describes occ double vision  8/11/23: Patient is seen for a follow up visit. Today is Day +64.Overall feels well with no acute concerns. Denies fever, chills,SOB,chest pain, edema, rash, mouth sores,nausea, vomiting, diarrhea or any signs of active bleeding. On FK 5mg 2 x day. Remains compliant with post transplantm edications. Remains compliant with diet and crowd restrictions.  On 9/7//23, patient presents for a follow up visit. Overall well and offers no acute concerns. Mild erythema of face resolved. On FK 5 mg BID. Denies fever, nausea, vomiting, diarrhea, mouth sores, dysuria or any signs of active bleeding. Denies SOB, chest pain or B/L LE edema.   9/29/23:Today is Day +113.Denies fever, chills, edema, SOB,chest pain,nausea,vomiting, diarrhea or any signs of active bleeding.On FK 5mg2 x day.Has mild resolving rash on forehead.Remains compliant with medications as prescribed.   10/17/23: Patient presents for a follow up visit. Overall, well and offers no acute concerns. Denies fever, chills, nausea, vomiting, diarrhea, rash, mouth sores or any signs of active bleeding. Denies SOB, chest pain or B/L LE edema. Patient reports dry skin, steroid cream helps.  11/21/23: Patient is here for a follow up visit. Denies fever, chills, nausea, vomiting, diarrhea, mouth sores or any signs of active bleeding. Denies SOB, chest pain or B/L LE edema. Patient had a rash throughout body, prescribed ointment helped. Patient complains of dry scalp.   12/19/23: Patient is seen for a follow up visit. Denies fever, chills, nausea, vomiting, diarrhea. mouth sores or any signs of active bleeding. He has healing rash on back and forehead. On FK 4mg BID.  1/30/24:  Patient is here for a follow up visit. Denies fever, chills, nausea, vomiting, diarrhea, mouth sores or any signs of active bleeding. Denies chest pain or B/L LE edema.  Dry skin and rash on face, scalp, underarms, as well as hair thinning on face and underarms. Pt states some nights he cannot sleep due to the rash being itchy. Some SOB when going up and down stairs in the last 2 weeks. On FK 3 mg BID.

## 2024-02-20 RX ORDER — FOLIC ACID 1 MG/1
1 TABLET ORAL DAILY
Qty: 30 | Refills: 3 | Status: DISCONTINUED | COMMUNITY
Start: 2023-07-05 | End: 2024-02-20

## 2024-03-11 ENCOUNTER — APPOINTMENT (OUTPATIENT)
Dept: FAMILY MEDICINE | Facility: CLINIC | Age: 39
End: 2024-03-11
Payer: COMMERCIAL

## 2024-03-11 DIAGNOSIS — Z83.3 FAMILY HISTORY OF DIABETES MELLITUS: ICD-10-CM

## 2024-03-11 DIAGNOSIS — Z13.1 ENCOUNTER FOR SCREENING FOR DIABETES MELLITUS: ICD-10-CM

## 2024-03-11 DIAGNOSIS — Z13.29 ENCOUNTER FOR SCREENING FOR OTHER SUSPECTED ENDOCRINE DISORDER: ICD-10-CM

## 2024-03-11 DIAGNOSIS — K21.9 GASTRO-ESOPHAGEAL REFLUX DISEASE W/OUT ESOPHAGITIS: ICD-10-CM

## 2024-03-11 DIAGNOSIS — Z76.89 PERSONS ENCOUNTERING HEALTH SERVICES IN OTHER SPECIFIED CIRCUMSTANCES: ICD-10-CM

## 2024-03-11 PROCEDURE — 99385 PREV VISIT NEW AGE 18-39: CPT

## 2024-03-11 PROCEDURE — 36415 COLL VENOUS BLD VENIPUNCTURE: CPT

## 2024-03-11 PROCEDURE — G2211 COMPLEX E/M VISIT ADD ON: CPT | Mod: NC,1L

## 2024-03-11 NOTE — PHYSICAL EXAM
[No Acute Distress] : no acute distress [Well Nourished] : well nourished [Well Developed] : well developed [Well-Appearing] : well-appearing [Normal Sclera/Conjunctiva] : normal sclera/conjunctiva [PERRL] : pupils equal round and reactive to light [Normal Outer Ear/Nose] : the outer ears and nose were normal in appearance [EOMI] : extraocular movements intact [Normal Oropharynx] : the oropharynx was normal [No JVD] : no jugular venous distention [No Lymphadenopathy] : no lymphadenopathy [Thyroid Normal, No Nodules] : the thyroid was normal and there were no nodules present [Supple] : supple [No Respiratory Distress] : no respiratory distress  [No Accessory Muscle Use] : no accessory muscle use [Clear to Auscultation] : lungs were clear to auscultation bilaterally [Normal Rate] : normal rate  [Regular Rhythm] : with a regular rhythm [Normal S1, S2] : normal S1 and S2 [No Murmur] : no murmur heard [No Abdominal Bruit] : a ~M bruit was not heard ~T in the abdomen [No Edema] : there was no peripheral edema [Pedal Pulses Present] : the pedal pulses are present [No Palpable Aorta] : no palpable aorta [No Extremity Clubbing/Cyanosis] : no extremity clubbing/cyanosis [Soft] : abdomen soft [Non Tender] : non-tender [Non-distended] : non-distended [No Masses] : no abdominal mass palpated [No HSM] : no HSM [Normal Bowel Sounds] : normal bowel sounds [No Joint Swelling] : no joint swelling [No Rash] : no rash [Grossly Normal Strength/Tone] : grossly normal strength/tone [Coordination Grossly Intact] : coordination grossly intact [No Focal Deficits] : no focal deficits [Normal Gait] : normal gait [Normal Affect] : the affect was normal [Normal Insight/Judgement] : insight and judgment were intact

## 2024-03-11 NOTE — HISTORY OF PRESENT ILLNESS
[FreeTextEntry1] : CPE [de-identified] : Mr. RENETTA CORREIA is a 38 year male with a PMH of aplastic anemia s/p BM transplant, HTN presents for a CPE as a new patient. Patient reports no acute issues. Has a sedentary job, tries to stay active, has been getting back to it since being cleared by heme/onc.

## 2024-03-11 NOTE — HEALTH RISK ASSESSMENT
[Good] : ~his/her~  mood as  good [Little interest or pleasure doing things] : 1) Little interest or pleasure doing things [0] : 2) Feeling down, depressed, or hopeless: Not at all (0) [Feeling down, depressed, or hopeless] : 2) Feeling down, depressed, or hopeless [PHQ-2 Negative - No further assessment needed] : PHQ-2 Negative - No further assessment needed [Not at all] : How difficult have these problems made it for you to do your work, take care of things at home, or get along with people? Not at all [Not at All (0)] : 9.) Thoughts that you would be off dead or of hurting yourself in some way? Not at all [PHQ-9 Negative - No further assessment needed] : PHQ-9 Negative - No further assessment needed [Fully functional (using the telephone, shopping, preparing meals, housekeeping, doing laundry, using] : Fully functional and needs no help or supervision to perform IADLs (using the telephone, shopping, preparing meals, housekeeping, doing laundry, using transportation, managing medications and managing finances) [Fully functional (bathing, dressing, toileting, transferring, walking, feeding)] : Fully functional (bathing, dressing, toileting, transferring, walking, feeding) [Former] : Former [0-4] : 0-4 [< 15 Years] : < 15 Years [GCW4Ddhdi] : 0 [[id number]] : 0 [de-identified] : Quit at 30 years old

## 2024-03-18 LAB
ALBUMIN SERPL ELPH-MCNC: 4.8 G/DL
ALP BLD-CCNC: 112 U/L
ALT SERPL-CCNC: 68 U/L
ANION GAP SERPL CALC-SCNC: 12 MMOL/L
APPEARANCE: CLEAR
AST SERPL-CCNC: 28 U/L
BACTERIA: NEGATIVE /HPF
BASOPHILS # BLD AUTO: 0.02 K/UL
BASOPHILS NFR BLD AUTO: 0.3 %
BILIRUB SERPL-MCNC: 0.5 MG/DL
BILIRUBIN URINE: NEGATIVE
BLOOD URINE: NEGATIVE
BUN SERPL-MCNC: 11 MG/DL
CALCIUM SERPL-MCNC: 9.6 MG/DL
CAST: 0 /LPF
CHLORIDE SERPL-SCNC: 104 MMOL/L
CHOLEST SERPL-MCNC: 222 MG/DL
CO2 SERPL-SCNC: 24 MMOL/L
COLOR: YELLOW
CREAT SERPL-MCNC: 0.86 MG/DL
EGFR: 115 ML/MIN/1.73M2
EOSINOPHIL # BLD AUTO: 0.16 K/UL
EOSINOPHIL NFR BLD AUTO: 2.8 %
EPITHELIAL CELLS: 1 /HPF
ESTIMATED AVERAGE GLUCOSE: 111 MG/DL
GLUCOSE QUALITATIVE U: NEGATIVE MG/DL
GLUCOSE SERPL-MCNC: 106 MG/DL
HBA1C MFR BLD HPLC: 5.5 %
HCT VFR BLD CALC: 48 %
HDLC SERPL-MCNC: 29 MG/DL
HGB BLD-MCNC: 16.5 G/DL
IMM GRANULOCYTES NFR BLD AUTO: 0.3 %
KETONES URINE: NEGATIVE MG/DL
LDLC SERPL CALC-MCNC: 139 MG/DL
LEUKOCYTE ESTERASE URINE: NEGATIVE
LYMPHOCYTES # BLD AUTO: 2.04 K/UL
LYMPHOCYTES NFR BLD AUTO: 35.3 %
MAN DIFF?: NORMAL
MCHC RBC-ENTMCNC: 32.7 PG
MCHC RBC-ENTMCNC: 34.4 GM/DL
MCV RBC AUTO: 95.2 FL
MICROSCOPIC-UA: NORMAL
MONOCYTES # BLD AUTO: 0.5 K/UL
MONOCYTES NFR BLD AUTO: 8.7 %
NEUTROPHILS # BLD AUTO: 3.04 K/UL
NEUTROPHILS NFR BLD AUTO: 52.6 %
NITRITE URINE: NEGATIVE
NONHDLC SERPL-MCNC: 193 MG/DL
PH URINE: 5.5
PLATELET # BLD AUTO: 239 K/UL
POTASSIUM SERPL-SCNC: 4.5 MMOL/L
PROT SERPL-MCNC: 7.1 G/DL
PROTEIN URINE: NEGATIVE MG/DL
RBC # BLD: 5.04 M/UL
RBC # FLD: 13.2 %
RED BLOOD CELLS URINE: 1 /HPF
SODIUM SERPL-SCNC: 140 MMOL/L
SPECIFIC GRAVITY URINE: 1.02
TRIGL SERPL-MCNC: 296 MG/DL
TSH SERPL-ACNC: 1.62 UIU/ML
UROBILINOGEN URINE: 0.2 MG/DL
WBC # FLD AUTO: 5.78 K/UL
WHITE BLOOD CELLS URINE: 1 /HPF

## 2024-03-26 NOTE — PROVIDER CONTACT NOTE (CRITICAL VALUE NOTIFICATION) - TEST AND RESULT REPORTED:
Head,  normocephalic,  atraumatic,  Face,  Face within normal limits,  Ears,  External ears within normal limits,  Nose/Nasopharynx,  External nose  normal appearance, no nasal discharge,  Mouth and Throat,  Oral cavity appearance normal Lips,  Appearance normal cyclosporine level = 514

## 2024-04-12 ENCOUNTER — OUTPATIENT (OUTPATIENT)
Dept: OUTPATIENT SERVICES | Facility: HOSPITAL | Age: 39
LOS: 1 days | Discharge: HOME | End: 2024-04-12

## 2024-04-12 DIAGNOSIS — Z98.890 OTHER SPECIFIED POSTPROCEDURAL STATES: Chronic | ICD-10-CM

## 2024-04-12 DIAGNOSIS — D61.9 APLASTIC ANEMIA, UNSPECIFIED: ICD-10-CM

## 2024-04-15 ENCOUNTER — APPOINTMENT (OUTPATIENT)
Dept: FAMILY MEDICINE | Facility: CLINIC | Age: 39
End: 2024-04-15
Payer: COMMERCIAL

## 2024-04-15 VITALS
HEART RATE: 94 BPM | DIASTOLIC BLOOD PRESSURE: 86 MMHG | BODY MASS INDEX: 33.04 KG/M2 | HEIGHT: 67.95 IN | OXYGEN SATURATION: 97 % | SYSTOLIC BLOOD PRESSURE: 122 MMHG | WEIGHT: 218 LBS

## 2024-04-15 DIAGNOSIS — Z86.79 PERSONAL HISTORY OF OTHER DISEASES OF THE CIRCULATORY SYSTEM: ICD-10-CM

## 2024-04-15 PROCEDURE — 99214 OFFICE O/P EST MOD 30 MIN: CPT

## 2024-04-15 RX ORDER — FAMOTIDINE 20 MG/1
20 TABLET, FILM COATED ORAL
Qty: 180 | Refills: 1 | Status: DISCONTINUED | COMMUNITY
End: 2024-04-15

## 2024-04-15 RX ORDER — FLUCONAZOLE 200 MG/1
200 TABLET ORAL
Qty: 60 | Refills: 3 | Status: DISCONTINUED | COMMUNITY
Start: 2023-07-05 | End: 2024-04-15

## 2024-04-15 RX ORDER — MYCOPHENOLATE MOFETIL 500 MG/1
500 TABLET ORAL
Qty: 10 | Refills: 0 | Status: DISCONTINUED | COMMUNITY
Start: 2023-06-27 | End: 2024-04-15

## 2024-04-15 RX ORDER — LETERMOVIR 480 MG/1
480 TABLET, FILM COATED ORAL
Qty: 30 | Refills: 3 | Status: DISCONTINUED | COMMUNITY
Start: 2023-05-24 | End: 2024-04-15

## 2024-04-15 NOTE — HISTORY OF PRESENT ILLNESS
[FreeTextEntry1] : Follow up HTN [de-identified] : Patient had elevated BP at last visit and was advised to log BP and return to re-evaluate. 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 He finished project on 4/3. He feels well.  BP log was reviewed. Most readings were at goal, except for a few readings with either SBP or DBP about up to 5 points above goal.

## 2024-04-18 ENCOUNTER — RESULT REVIEW (OUTPATIENT)
Age: 39
End: 2024-04-18

## 2024-04-18 ENCOUNTER — APPOINTMENT (OUTPATIENT)
Dept: HEMATOLOGY ONCOLOGY | Facility: CLINIC | Age: 39
End: 2024-04-18
Payer: COMMERCIAL

## 2024-04-18 ENCOUNTER — APPOINTMENT (OUTPATIENT)
Dept: HEMATOLOGY ONCOLOGY | Facility: CLINIC | Age: 39
End: 2024-04-18

## 2024-04-18 VITALS
WEIGHT: 219.36 LBS | DIASTOLIC BLOOD PRESSURE: 87 MMHG | OXYGEN SATURATION: 98 % | RESPIRATION RATE: 16 BRPM | SYSTOLIC BLOOD PRESSURE: 131 MMHG | TEMPERATURE: 98.2 F | BODY MASS INDEX: 33.4 KG/M2 | HEART RATE: 87 BPM

## 2024-04-18 DIAGNOSIS — L29.9 PRURITUS, UNSPECIFIED: ICD-10-CM

## 2024-04-18 LAB
ALBUMIN SERPL ELPH-MCNC: 4.7 G/DL
ALP BLD-CCNC: 115 U/L
ALT SERPL-CCNC: 91 U/L
ANION GAP SERPL CALC-SCNC: 12 MMOL/L
AST SERPL-CCNC: 38 U/L
BASOPHILS # BLD AUTO: 0.03 K/UL — SIGNIFICANT CHANGE UP (ref 0–0.2)
BASOPHILS NFR BLD AUTO: 0.4 % — SIGNIFICANT CHANGE UP (ref 0–2)
BILIRUB SERPL-MCNC: 0.7 MG/DL
BUN SERPL-MCNC: 13 MG/DL
CALCIUM SERPL-MCNC: 9.8 MG/DL
CHLORIDE SERPL-SCNC: 102 MMOL/L
CO2 SERPL-SCNC: 24 MMOL/L
CREAT SERPL-MCNC: 0.91 MG/DL
EGFR: 112 ML/MIN/1.73M2
EOSINOPHIL # BLD AUTO: 0.19 K/UL — SIGNIFICANT CHANGE UP (ref 0–0.5)
EOSINOPHIL NFR BLD AUTO: 2.5 % — SIGNIFICANT CHANGE UP (ref 0–6)
GLUCOSE SERPL-MCNC: 106 MG/DL
HCT VFR BLD CALC: 46 % — SIGNIFICANT CHANGE UP (ref 39–50)
HGB BLD-MCNC: 16.2 G/DL — SIGNIFICANT CHANGE UP (ref 13–17)
IMM GRANULOCYTES NFR BLD AUTO: 0.5 % — SIGNIFICANT CHANGE UP (ref 0–0.9)
LDH SERPL-CCNC: 267 U/L
LYMPHOCYTES # BLD AUTO: 4.27 K/UL — HIGH (ref 1–3.3)
LYMPHOCYTES # BLD AUTO: 56.5 % — HIGH (ref 13–44)
MAGNESIUM SERPL-MCNC: 2.2 MG/DL
MCHC RBC-ENTMCNC: 32.1 PG — SIGNIFICANT CHANGE UP (ref 27–34)
MCHC RBC-ENTMCNC: 35.2 G/DL — SIGNIFICANT CHANGE UP (ref 32–36)
MCV RBC AUTO: 91.1 FL — SIGNIFICANT CHANGE UP (ref 80–100)
MONOCYTES # BLD AUTO: 0.84 K/UL — SIGNIFICANT CHANGE UP (ref 0–0.9)
MONOCYTES NFR BLD AUTO: 11.1 % — SIGNIFICANT CHANGE UP (ref 2–14)
NEUTROPHILS # BLD AUTO: 2.19 K/UL — SIGNIFICANT CHANGE UP (ref 1.8–7.4)
NEUTROPHILS NFR BLD AUTO: 29 % — LOW (ref 43–77)
NRBC # BLD: 0 /100 WBCS — SIGNIFICANT CHANGE UP (ref 0–0)
PLATELET # BLD AUTO: 175 K/UL — SIGNIFICANT CHANGE UP (ref 150–400)
POTASSIUM SERPL-SCNC: 4.9 MMOL/L
PROT SERPL-MCNC: 7.1 G/DL
RBC # BLD: 5.05 M/UL — SIGNIFICANT CHANGE UP (ref 4.2–5.8)
RBC # FLD: 12.8 % — SIGNIFICANT CHANGE UP (ref 10.3–14.5)
SODIUM SERPL-SCNC: 138 MMOL/L
TACROLIMUS SERPL-MCNC: <2 NG/ML
WBC # BLD: 7.56 K/UL — SIGNIFICANT CHANGE UP (ref 3.8–10.5)
WBC # FLD AUTO: 7.56 K/UL — SIGNIFICANT CHANGE UP (ref 3.8–10.5)

## 2024-04-18 PROCEDURE — 99215 OFFICE O/P EST HI 40 MIN: CPT

## 2024-04-18 RX ORDER — TRIAMCINOLONE ACETONIDE 1 MG/G
0.1 OINTMENT TOPICAL
Qty: 160 | Refills: 0 | Status: ACTIVE | COMMUNITY
Start: 2023-11-22

## 2024-04-18 RX ORDER — TRIAMCINOLONE ACETONIDE 1 MG/ML
0.1 LOTION TOPICAL
Qty: 60 | Refills: 0 | Status: ACTIVE | COMMUNITY
Start: 2023-11-22

## 2024-04-18 RX ORDER — KETOCONAZOLE 20.5 MG/ML
2 SHAMPOO, SUSPENSION TOPICAL
Qty: 120 | Refills: 0 | Status: ACTIVE | COMMUNITY
Start: 2023-11-22

## 2024-04-18 NOTE — REVIEW OF SYSTEMS
[Fatigue] : fatigue [Skin Rash] : skin rash [Negative] : Allergic/Immunologic [Fever] : no fever [Chills] : no chills [Night Sweats] : no night sweats [Recent Change In Weight] : ~T no recent weight change [Eye Pain] : no eye pain [Red Eyes] : eyes not red [Dry Eyes] : no dryness of the eyes [Vision Problems] : no vision problems [Skin Wound] : no skin wound [de-identified] :  Right index finger hyperpigmented, Mild resolving rash of forehead and mid back.

## 2024-04-18 NOTE — PHYSICAL EXAM
[Restricted in physically strenuous activity but ambulatory and able to carry out work of a light or sedentary nature] : Status 1- Restricted in physically strenuous activity but ambulatory and able to carry out work of a light or sedentary nature, e.g., light house work, office work [Normal] : affect appropriate [Yes] : Yes [] :  [Maculopapular rash < 25% BSA] : Skin: Maculopapular rash < 25% BSA [< 2 mg/dl] : Liver: < 2 mg/dl [No or intermittent] : Upper GI: No or intermittent nausea, vomiting or anorexia [<500 ml/day or < 3 episodes/day] : Lower GI (stool output/day): <500 ml/day or <3 episodes/day [I] : I [No] : No [de-identified] :  skin hyperpigmented..few acne like lesions on face, mild resolving rash of face, dry scalp [FreeTextEntry1] : 06/08/2023 [FreeTextEntry4] : Off Cellcept

## 2024-04-18 NOTE — ADDENDUM
[FreeTextEntry1] :  Documented by Laine Hernandes acting as a scribe for Dr. Bella Wilder on 4/18/24. All medical record entries made by the Scribe were at my, Dr. Bella Wilder, direction and personally dictated by me on 4/18/24. I have reviewed the chart and agree that the record accurately reflects my personal performance of the history, physical exam, assessment and plan. I have also personally directed, reviewed, and agree with the discharge instructions.

## 2024-04-18 NOTE — ASSESSMENT
[FreeTextEntry1] : Aplastic anemia (284.9) (D61.9) S/P allogeneic bone marrow transplant (V42.81) (Z94.81) This is a 38 year old male with severe aplastic anemia admitted for a haplo-identical bone marrow from his sister (6/12) with Flu / Cy / TBI prep regimen. Hematologic history as follows: initially diagnosed by bone marrow biopsy 4/20, began treatment 4/23/20 with horse ATG + CSA + promacta and achieved CR1. A bone marrow biopsy 11/9/20 showed hypocellular marrow, with overall improved cellularity showing marrow regeneration. Bone marrow biopsy 5/18/21 showed normal morphology and normal cellularity. He relapsed 11/2021, developing worsening cytopenias and neutropenic fevers. He was then treated with rabbit ATG, prednisone, CSA and promacta with partial response 12/27/21. He later developed worsening hemolysis and pancytopenia, with a rising PNH clone and was treated with Ravulizumab. Status post Haplo identical bone marrow transplant from his sister on 6/8/23.  1) Severe aplastic anemia. -Status post Haplo identical bone marrow transplant from his sister on 6/8/23. -7/3/23: FISH for Y= 96% donor  7/10/23: FISH for Y = 99.5% donor 8/3/23: FISH for Y =100% donor 9/7/23:FISH for Y = 99% donor cells 9/29/23: FISH for Y = 98.5% 11/7/23: FISH for Y = 97.5% 11/21/23:FISH for Y = 97% donor cells 12/19/23: FISH for Y = 97.5% donor cells 1/30/24: FISH for Y = 98.5% donor cells  2) Heme Counts stable. No indication for transfusions today. Can stop folic acid 1 mg oral tablet: 1 tab(s) orally once a day and Multiple Vitamins oral tablet: 1 tab(s) orally once a day.  3) ID Continue ppx: Acyclovir 400 mg oral tablet: 1 tab(s) orally every 12 hours Atovaquone 750 mg/5 mL oral suspension: 5 milliliter(s) orally 2 times a day Fluconazole 200 mg oral tablet: 2 tab(s) orally once a day- HOLD as of 7/10/23. Letermovir 480 mg oral tablet: 1 tab(s) orally once a day. - can STOP when finishes supply  GVHD Skin 1 Liver 0 GI 0- overall grade 0...overall 1 Rash on forehead and mid back .. sees dermatologist..follow closely On FK 4 mg BID since 12/11/23.....will decrease to 3 mg bid, 1/30/24 taper to 2.5 mg bid, 4/18/24 taper to 2.0 mg bid for 2 weeks, then 1.5 mg bid On Cellcept to 1000 mg BID. On 7/15/23, decrease cellcept to 1000 mg daily. OFF SINCE Week of 7/17/23 Reviewed signs and symptoms of GVHD. 4/18/24: Ordered Jakafi 5mg bid   7/5/23 CMV PCR negative. Continue to monitor weekly. 7/19/23:CMV PCR negative 8/3/23: CMV PCR-<34.5 IU/Ml 9/7/23:CMV PCR negative 11/21/23: CMV negative  4) GI Continue ppx: Ondansetron 8 mg oral tablet: 1 tab(s) orally every 8 hours as needed for nausea Pepcid 20 mg oral tablet: 1 tab(s) orally 2 times a day  Transaminitis: 7/3/23: AST 47, , Alk Phos 115. Continue to monitor HOLD fluconazole as of 7/10/23 7/27/23: AST 41  AlkPhos 106.....continue to HOLD Fluconazole Continue to HOLD Fluconazole  5) Other Continue: Magnesium oxide 400 mg oral tablet: 1 tab(s) orally 3 times a day (with meals) Ursodiol 300 mg oral capsule: 1 cap(s) orally 2 times a day Eye Pain / occ double vision- Continue to monitor. head ct ordered-RESOLVED Mild erythema of face- prescribed clobetasol on 8/17/23. resolved...1/30/24 does wax and wane Derm prescribed triamcinolone acetonide for rash...Dr. Wilder prescribed atarax 4/18/24   6) Plan Continue post transplant diet and crowd restrictions. BMB Day 120-150 - trilineage hematopoiesis, fish normal Notify your physician if bleeding; swelling; persistent nausea and vomiting; unable to urinate; pain not relieved by medications; fever; numbness, tingling; excessive diarrhea, inability to tolerate liquids or foods; increased irritability or sluggishness, rash Discussed post-transplant vaccinations Recommend pt to see ophthalmologist for routine check up post transplant  Will order repeat lung function test Discussed the tapering side effects of Tacrolimus and GVHD ...discussed the addition of Jakafi and/or Prednisone Follow up in 8 weeks with Dr. Wilder

## 2024-04-18 NOTE — HISTORY OF PRESENT ILLNESS
[de-identified] : 38 y/o M with no significant PMHx diagnosed with very severe aplastic anemia in April 2020. H confirmed on bone marrow biopsy at [facility] and now s/p inpatient treatment beginning 4/23/2020 with horse ATG + CsA + promacta with complete remission. Bone marrow bx on 11/9/20 revealed hypocellular marrow, overall cellularity improved (20-50%) showing marrow regeneration. BMBx on 5/18/2021 showing normal morphology with normal cellularity. Unfortunately patient relapsed in November 2021 with worsening cytopenias and neutropenic fever. He was admitted for treatment with rabbit ATG, prednisone, cyclosporine and Promacta with partial response  starting 12/27/21. 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 He reports doing well with ravulizumab and is able to continue working full time.       PMHX: Asthma    [de-identified] : This is a 37 year old male with severe aplastic anemia admitted for a haplo-identical bone marrow from his sister (6/12) with Flu / Cy / TBI prep regimen. Hematologic history as follows: initially diagnosed by bone marrow biopsy 4/20, began treatment 4/23/20 with horse ATG + CSA + promacta and achieved CR1. A bone marrow biopsy 11/9/20 showed hypocellular marrow, with overall improved cellularity showing marrow regeneration. Bone marrow biopsy 5/18/21 showed normal morphology and normal cellularity. He relapsed 11/2021, developing worsening cytopenias and neutropenic fevers. He was then treated with rabbit ATG, prednisone, CSA and promacta with partial response 12/27/21.  He later developed worsening hemolysis and pancytopenia, with a rising PNH clone and was treated with Ravulizumab.   Upon admission, a TLC was placed in IR. Mr. Puckett received IV hydration, pain management, nutritional support, and antibacterial / antiviral / antifungal / VOD / GI and PCP prophylaxis. Labs were monitored on a daily basis, and he received transfusional support and electrolyte repletion as needed. While admitted, Mr. Puckett experienced pancytopenia related to the high dose chemotherapy prep regimen. When he became neutropenic, he was started on prophylactic ciprofloxacin. He also had neutropenic fevers. When he became febrile, blood and urine cultures were sent, a CXR completed the ciprofloxacin was changed to cefepime.   On 6/8/23, after pre-medication Mr. Puckett received 548 mL of allogeneic, related, fresh, pooled, plasma reduced, haplo-identical HPC marrow for over approximately 90 min. Cell counts as follows  Total MNC( x10^8/kg)=21.08  CD34+cells ( x10^6 kg)=1.65  CD3+cells( x10^7)=2.94  Cell Viability (%)= 99  During the infusion Mr. Puckett developed hives and required additional dose  of benadryl and hydrocortisone injection.   Post transplant, on 6/12/23 he experienced a fever attributed to CRS post transplant. He was treated with a dose of tocilizumab. On 6/20/23, he developed clinical sinusitis, the cefepime was changed to zosyn. He completed a 10 day course. Pansinusitis was confirmed on a CT maxillofacial / orbit. Opthalmology was consulted on 6/21/23 for eye pain. Mr. Puckett also experienced chemotherapy induced diarrhea. C. diff was negative on 6/11/23.   Engraftment was noted on 6/26/23. The daily zarxio was discontinued on 6/30/23, as was the zosyn. Post engraftment a FISH for Y was sent to determine chimerism, results are pending. A CMV PCR was sent as well. Currently, Mr. Puckett is stable for discharge home with outpatient follow up at the [facility].   On 7/5/23 visit, day + 27 post Haplo identical bone marrow transplant from his sister on 6/8/23. Overall fatigued but, feels well overall. Felt bumps on bilateral hands over the weekend. Right index finger hyperpigmented. On Cellcept 1000 mg TID and FK 4.5 mg BID. Did not take FK prior to blood work today. Denies fever, chills, nausea, vomiting, diarrhea, rash, mouth sores, dysuria or any signs of active bleeding. Denies SOB, chest pain or B/L LE edema. Remains compliant with post transplant medications. Remains compliant with post transplant diet and crowd restrictions.   On 7/19/23 visit, day + 46 post Allogeneic PBSCT. On Cellcept 1000 mg QD and FK 4.5 mg BID. Overall well with no acute concerns. Complains of fatigue and pain behind the left eye. Denies any vision changes. Denies fever, chills, nausea, vomiting, diarrhea, rash, mouth sores, dysuria or any signs of active bleeding. Denies SOB, chest pain or B/L LE edema. Remains compliant with post transplant medications. Remains compliant with post transplant diet and crowd restrictions.   8/3/23:Today is Day + 60 post Allogeneic PBSCT.Denies fever, chills, nausea,vomiting, diarrhea, SOB,chest pain,edema, rash,mouth sores,dysuria or any signs of active bleeding.On FK 4.5mg 2 x day.He is off Cellcept since last week.Remains compliant with medications as prescribed. Remains compliant with diet and crowd restrictions. Describes occ double vision  8/11/23: Patient is seen for a follow up visit. Today is Day +64.Overall feels well with no acute concerns. Denies fever, chills,SOB,chest pain, edema, rash, mouth sores,nausea, vomiting, diarrhea or any signs of active bleeding. On FK 5mg 2 x day. Remains compliant with post transplantm edications. Remains compliant with diet and crowd restrictions.  On 9/7//23, patient presents for a follow up visit. Overall well and offers no acute concerns. Mild erythema of face resolved. On FK 5 mg BID. Denies fever, nausea, vomiting, diarrhea, mouth sores, dysuria or any signs of active bleeding. Denies SOB, chest pain or B/L LE edema.   9/29/23:Today is Day +113.Denies fever, chills, edema, SOB,chest pain,nausea,vomiting, diarrhea or any signs of active bleeding.On FK 5mg2 x day.Has mild resolving rash on forehead.Remains compliant with medications as prescribed.   10/17/23: Patient presents for a follow up visit. Overall, well and offers no acute concerns. Denies fever, chills, nausea, vomiting, diarrhea, rash, mouth sores or any signs of active bleeding. Denies SOB, chest pain or B/L LE edema. Patient reports dry skin, steroid cream helps.  11/21/23: Patient is here for a follow up visit. Denies fever, chills, nausea, vomiting, diarrhea, mouth sores or any signs of active bleeding. Denies SOB, chest pain or B/L LE edema. Patient had a rash throughout body, prescribed ointment helped. Patient complains of dry scalp.   12/19/23: Patient is seen for a follow up visit. Denies fever, chills, nausea, vomiting, diarrhea. mouth sores or any signs of active bleeding. He has healing rash on back and forehead. On FK 4mg BID.  1/30/24:  Patient is here for a follow up visit. Denies fever, chills, nausea, vomiting, diarrhea, mouth sores or any signs of active bleeding. Denies chest pain or B/L LE edema.  Dry skin and rash on face, scalp, underarms, as well as hair thinning on face and underarms. Pt states some nights he cannot sleep due to the rash being itchy. Some SOB when going up and down stairs in the last 2 weeks. On FK 3 mg BID.  4/18/24:  Patient is here for a follow up visit. Denies fever, chills, nausea, vomiting, diarrhea, mouth sores or any signs of active bleeding. Denies SOB, chest pain or B/L LE edema. On FK 2.5 mg bid. Pt monitors blood pressure at home. Itchy rash persists on arms, stomach, back, face. Complains of dry eyes and hair thinning.

## 2024-04-19 PROBLEM — L29.9 PRURITUS: Status: ACTIVE | Noted: 2024-04-15

## 2024-04-19 LAB
CMV DNA SPEC QL NAA+PROBE: ABNORMAL IU/ML
CMVPCR LOG: ABNORMAL LOG10IU/ML

## 2024-05-16 RX ORDER — URSODIOL 300 MG/1
300 CAPSULE ORAL
Qty: 60 | Refills: 3 | Status: ACTIVE | COMMUNITY
Start: 2023-06-27 | End: 1900-01-01

## 2024-05-16 RX ORDER — OMEGA-3/DHA/EPA/FISH OIL 300-1000MG
400 CAPSULE ORAL
Qty: 90 | Refills: 3 | Status: ACTIVE | COMMUNITY
Start: 2023-07-05 | End: 1900-01-01

## 2024-05-16 RX ORDER — ATOVAQUONE 750 MG/5ML
750 SUSPENSION ORAL
Qty: 1 | Refills: 3 | Status: ACTIVE | COMMUNITY
Start: 2023-07-05 | End: 1900-01-01

## 2024-05-16 RX ORDER — HYDROXYZINE HYDROCHLORIDE 25 MG/1
25 TABLET ORAL
Qty: 100 | Refills: 0 | Status: ACTIVE | COMMUNITY
Start: 2024-04-18 | End: 1900-01-01

## 2024-05-20 RX ORDER — ACYCLOVIR 400 MG/1
400 TABLET ORAL TWICE DAILY
Qty: 60 | Refills: 3 | Status: ACTIVE | COMMUNITY
Start: 2024-01-18 | End: 2024-09-17

## 2024-06-10 RX ORDER — TACROLIMUS 1 MG/1
1 CAPSULE ORAL
Qty: 120 | Refills: 1 | Status: ACTIVE | COMMUNITY
Start: 2023-06-27 | End: 1900-01-01

## 2024-06-10 RX ORDER — TACROLIMUS 0.5 MG/1
0.5 CAPSULE ORAL
Qty: 60 | Refills: 1 | Status: ACTIVE | COMMUNITY
Start: 2024-01-30 | End: 1900-01-01

## 2024-06-11 ENCOUNTER — OUTPATIENT (OUTPATIENT)
Dept: OUTPATIENT SERVICES | Facility: HOSPITAL | Age: 39
LOS: 1 days | Discharge: HOME | End: 2024-06-11

## 2024-06-11 DIAGNOSIS — D61.9 APLASTIC ANEMIA, UNSPECIFIED: ICD-10-CM

## 2024-06-11 DIAGNOSIS — Z98.890 OTHER SPECIFIED POSTPROCEDURAL STATES: Chronic | ICD-10-CM

## 2024-06-12 DIAGNOSIS — D61.9 APLASTIC ANEMIA, UNSPECIFIED: ICD-10-CM

## 2024-06-13 ENCOUNTER — RESULT REVIEW (OUTPATIENT)
Age: 39
End: 2024-06-13

## 2024-06-13 ENCOUNTER — OUTPATIENT (OUTPATIENT)
Dept: OUTPATIENT SERVICES | Facility: HOSPITAL | Age: 39
LOS: 1 days | End: 2024-06-13
Payer: COMMERCIAL

## 2024-06-13 ENCOUNTER — APPOINTMENT (OUTPATIENT)
Dept: HEMATOLOGY ONCOLOGY | Facility: CLINIC | Age: 39
End: 2024-06-13
Payer: COMMERCIAL

## 2024-06-13 VITALS
HEART RATE: 72 BPM | WEIGHT: 220.02 LBS | DIASTOLIC BLOOD PRESSURE: 92 MMHG | BODY MASS INDEX: 33.5 KG/M2 | TEMPERATURE: 96.9 F | SYSTOLIC BLOOD PRESSURE: 135 MMHG | OXYGEN SATURATION: 99 % | RESPIRATION RATE: 16 BRPM

## 2024-06-13 DIAGNOSIS — D59.5: ICD-10-CM

## 2024-06-13 DIAGNOSIS — Z94.81 BONE MARROW TRANSPLANT STATUS: ICD-10-CM

## 2024-06-13 DIAGNOSIS — U07.1 COVID-19: ICD-10-CM

## 2024-06-13 DIAGNOSIS — E78.5 HYPERLIPIDEMIA, UNSPECIFIED: ICD-10-CM

## 2024-06-13 DIAGNOSIS — Z98.890 OTHER SPECIFIED POSTPROCEDURAL STATES: Chronic | ICD-10-CM

## 2024-06-13 LAB
ALBUMIN SERPL ELPH-MCNC: 5.1 G/DL
ALP BLD-CCNC: 106 U/L
ALT SERPL-CCNC: 73 U/L
ANION GAP SERPL CALC-SCNC: 11 MMOL/L
AST SERPL-CCNC: 41 U/L
BASOPHILS # BLD AUTO: 0.01 K/UL — SIGNIFICANT CHANGE UP (ref 0–0.2)
BASOPHILS NFR BLD AUTO: 0.2 % — SIGNIFICANT CHANGE UP (ref 0–2)
BILIRUB SERPL-MCNC: 0.5 MG/DL
BUN SERPL-MCNC: 13 MG/DL
CALCIUM SERPL-MCNC: 9.5 MG/DL
CD16+CD56+ CELLS # BLD: 236 CELLS/UL
CD16+CD56+ CELLS NFR BLD: 8 %
CD19 CELLS NFR BLD: 622 CELLS/UL
CD3 CELLS # BLD: 2004 CELLS/UL
CD3 CELLS NFR BLD: 68 %
CD3+CD4+ CELLS # BLD: 346 CELLS/UL
CD3+CD4+ CELLS NFR BLD: 11 %
CD3+CD4+ CELLS/CD3+CD8+ CLL SPEC: 0.22 RATIO
CD3+CD8+ CELLS # SPEC: 1587 CELLS/UL
CD3+CD8+ CELLS NFR BLD: 52 %
CELLS.CD3-CD19+/CELLS IN BLOOD: 22 %
CHLORIDE SERPL-SCNC: 105 MMOL/L
CO2 SERPL-SCNC: 24 MMOL/L
CREAT SERPL-MCNC: 0.94 MG/DL
DEPRECATED KAPPA LC FREE/LAMBDA SER: 1.06 RATIO
EGFR: 108 ML/MIN/1.73M2
EOSINOPHIL # BLD AUTO: 0.06 K/UL — SIGNIFICANT CHANGE UP (ref 0–0.5)
EOSINOPHIL NFR BLD AUTO: 1.1 % — SIGNIFICANT CHANGE UP (ref 0–6)
FERRITIN SERPL-MCNC: 3044 NG/ML
GLUCOSE SERPL-MCNC: 97 MG/DL
HCT VFR BLD CALC: 45.4 % — SIGNIFICANT CHANGE UP (ref 39–50)
HGB BLD-MCNC: 16.4 G/DL — SIGNIFICANT CHANGE UP (ref 13–17)
IGA SER QL IEP: 131 MG/DL
IGG SER QL IEP: 986 MG/DL
IGM SER QL IEP: 74 MG/DL
IMM GRANULOCYTES NFR BLD AUTO: 0.2 % — SIGNIFICANT CHANGE UP (ref 0–0.9)
KAPPA LC CSF-MCNC: 1.16 MG/DL
KAPPA LC SERPL-MCNC: 1.23 MG/DL
LDH SERPL-CCNC: 210 U/L
LYMPHOCYTES # BLD AUTO: 2.72 K/UL — SIGNIFICANT CHANGE UP (ref 1–3.3)
LYMPHOCYTES # BLD AUTO: 49.9 % — HIGH (ref 13–44)
MAGNESIUM SERPL-MCNC: 2.3 MG/DL
MCHC RBC-ENTMCNC: 32.7 PG — SIGNIFICANT CHANGE UP (ref 27–34)
MCHC RBC-ENTMCNC: 36.1 G/DL — HIGH (ref 32–36)
MCV RBC AUTO: 90.4 FL — SIGNIFICANT CHANGE UP (ref 80–100)
MONOCYTES # BLD AUTO: 0.61 K/UL — SIGNIFICANT CHANGE UP (ref 0–0.9)
MONOCYTES NFR BLD AUTO: 11.2 % — SIGNIFICANT CHANGE UP (ref 2–14)
NEUTROPHILS # BLD AUTO: 2.04 K/UL — SIGNIFICANT CHANGE UP (ref 1.8–7.4)
NEUTROPHILS NFR BLD AUTO: 37.4 % — LOW (ref 43–77)
NRBC # BLD: 0 /100 WBCS — SIGNIFICANT CHANGE UP (ref 0–0)
PLATELET # BLD AUTO: 191 K/UL — SIGNIFICANT CHANGE UP (ref 150–400)
POTASSIUM SERPL-SCNC: 4.7 MMOL/L
PROT SERPL-MCNC: 7.3 G/DL
RBC # BLD: 5.02 M/UL — SIGNIFICANT CHANGE UP (ref 4.2–5.8)
RBC # FLD: 12.7 % — SIGNIFICANT CHANGE UP (ref 10.3–14.5)
SODIUM SERPL-SCNC: 140 MMOL/L
T3 SERPL-MCNC: 125 NG/DL
T4 SERPL-MCNC: 7.3 UG/DL
TACROLIMUS SERPL-MCNC: <2 NG/ML
TESTOST SERPL-MCNC: 228 NG/DL
TSH SERPL-ACNC: 2.08 UIU/ML
WBC # BLD: 5.45 K/UL — SIGNIFICANT CHANGE UP (ref 3.8–10.5)
WBC # FLD AUTO: 5.45 K/UL — SIGNIFICANT CHANGE UP (ref 3.8–10.5)

## 2024-06-13 PROCEDURE — 99215 OFFICE O/P EST HI 40 MIN: CPT

## 2024-06-13 PROCEDURE — 86850 RBC ANTIBODY SCREEN: CPT

## 2024-06-13 PROCEDURE — 86900 BLOOD TYPING SEROLOGIC ABO: CPT

## 2024-06-13 PROCEDURE — 86901 BLOOD TYPING SEROLOGIC RH(D): CPT

## 2024-06-13 PROCEDURE — 86920 COMPATIBILITY TEST SPIN: CPT

## 2024-06-13 RX ORDER — RUXOLITINIB 10 MG/1
10 TABLET ORAL
Qty: 60 | Refills: 3 | Status: ACTIVE | COMMUNITY
Start: 2024-04-18 | End: 1900-01-01

## 2024-06-13 NOTE — PHYSICAL EXAM
[Restricted in physically strenuous activity but ambulatory and able to carry out work of a light or sedentary nature] : Status 1- Restricted in physically strenuous activity but ambulatory and able to carry out work of a light or sedentary nature, e.g., light house work, office work [Normal] : affect appropriate [Yes] : Yes [] :  [Maculopapular rash < 25% BSA] : Skin: Maculopapular rash < 25% BSA [< 2 mg/dl] : Liver: < 2 mg/dl [No or intermittent] : Upper GI: No or intermittent nausea, vomiting or anorexia [<500 ml/day or < 3 episodes/day] : Lower GI (stool output/day): <500 ml/day or <3 episodes/day [I] : I [No] : No [de-identified] :  skin hyperpigmented..few acne like lesions on face, mild resolving rash of face, dry scalp [FreeTextEntry1] : 06/08/2023 [FreeTextEntry4] : Off Cellcept

## 2024-06-13 NOTE — ASSESSMENT
[FreeTextEntry1] : Aplastic anemia (284.9) (D61.9) S/P allogeneic bone marrow transplant (V42.81) (Z94.81) This is a 38 year old male with severe aplastic anemia admitted for a haplo-identical bone marrow from his sister (6/12) with Flu / Cy / TBI prep regimen. Hematologic history as follows: initially diagnosed by bone marrow biopsy 4/20, began treatment 4/23/20 with horse ATG + CSA + promacta and achieved CR1. A bone marrow biopsy 11/9/20 showed hypocellular marrow, with overall improved cellularity showing marrow regeneration. Bone marrow biopsy 5/18/21 showed normal morphology and normal cellularity. He relapsed 11/2021, developing worsening cytopenias and neutropenic fevers. He was then treated with rabbit ATG, prednisone, CSA and promacta with partial response 12/27/21. He later developed worsening hemolysis and pancytopenia, with a rising PNH clone and was treated with Ravulizumab. Status post Haplo identical bone marrow transplant from his sister on 6/8/23.  1) Severe aplastic anemia. -Status post Haplo identical bone marrow transplant from his sister on 6/8/23. -7/3/23: FISH for Y= 96% donor  7/10/23: FISH for Y = 99.5% donor 8/3/23: FISH for Y =100% donor 9/7/23:FISH for Y = 99% donor cells 9/29/23: FISH for Y = 98.5% 11/7/23: FISH for Y = 97.5% 11/21/23:FISH for Y = 97% donor cells 12/19/23: FISH for Y = 97.5% donor cells 1/30/24: FISH for Y = 98.5% donor cells 4/18/24: FISH for Y = 99% donor cells  2) Heme Counts stable. No indication for transfusions today. Can stop folic acid 1 mg oral tablet: 1 tab(s) orally once a day and Multiple Vitamins oral tablet: 1 tab(s) orally once a day.  3) ID Continue ppx: Acyclovir 400 mg oral tablet: 1 tab(s) orally every 12 hours Atovaquone 750 mg/5 mL oral suspension: 5 milliliter(s) orally 2 times a day Fluconazole 200 mg oral tablet: 2 tab(s) orally once a day- HOLD as of 7/10/23. off Letermovir 480 mg oral tablet: 1 tab(s) orally once a day. - can STOP when finishes supply..off  GVHD Skin 1 Liver 0 GI 0- overall grade 0...overall 1 Rash on forehead and mid back .. sees dermatologist..follow closely On FK 4 mg BID since 12/11/23.....will decrease to 3 mg bid, 1/30/24 taper to 2.5 mg bid, 4/18/24 taper to 2.0 mg bid for 2 weeks, then 1.5 mg bid On Cellcept to 1000 mg BID. On 7/15/23, decrease cellcept to 1000 mg daily. OFF SINCE Week of 7/17/23 Reviewed signs and symptoms of GVHD. 4/18/24: Ordered Jakafi 5mg bid  6/13/24: Decrease FK to 1mg bid, after 3 weeks decrease to 0.5 bid. Increase Jakafi to 10 mg bid.   7/5/23 CMV PCR negative. Continue to monitor weekly. 7/19/23:CMV PCR negative 8/3/23: CMV PCR-<34.5 IU/Ml 9/7/23:CMV PCR negative 11/21/23: CMV negative  4) GI Continue ppx: Ondansetron 8 mg oral tablet: 1 tab(s) orally every 8 hours as needed for nausea Pepcid 20 mg oral tablet: 1 tab(s) orally 2 times a day  Transaminitis: 7/3/23: AST 47, , Alk Phos 115. Continue to monitor HOLD fluconazole as of 7/10/23 7/27/23: AST 41  AlkPhos 106.....continue to HOLD Fluconazole Continue to HOLD Fluconazole  5) Other Continue: Magnesium oxide 400 mg oral tablet: 1 tab(s) orally 3 times a day (with meals) Ursodiol 300 mg oral capsule: 1 cap(s) orally 2 times a day Eye Pain / occ double vision- Continue to monitor. head ct ordered-RESOLVED Mild erythema of face- prescribed clobetasol on 8/17/23. resolved...1/30/24 does wax and wane Derm prescribed triamcinolone acetonide for rash...Dr. Wilder prescribed atarax 4/18/24  6) Plan Continue post transplant diet and crowd restrictions. BMB Day 120-150 - trilineage hematopoiesis, fish normal Notify your physician if bleeding; swelling; persistent nausea and vomiting; unable to urinate; pain not relieved by medications; fever; numbness, tingling; excessive diarrhea, inability to tolerate liquids or foods; increased irritability or sluggishness, rash Discussed post-transplant vaccinations ..will re eval next visit.. Recommend pt to see Dr. Sinclair for routine optho check up post transplant .. to use artificial tears in the meantime Will order repeat lung function test Discussed the tapering side effects of Tacrolimus and GVHD ...discussed the addition of Jakafi and/or Prednisone Follow up in 12 weeks with Dr. Wilder

## 2024-06-13 NOTE — REVIEW OF SYSTEMS
[Fatigue] : fatigue [Skin Rash] : skin rash [Negative] : Allergic/Immunologic [Fever] : no fever [Chills] : no chills [Night Sweats] : no night sweats [Recent Change In Weight] : ~T no recent weight change [Eye Pain] : no eye pain [Red Eyes] : eyes not red [Dry Eyes] : no dryness of the eyes [Vision Problems] : no vision problems [Skin Wound] : no skin wound [de-identified] :  Right index finger hyperpigmented, Mild resolving rash of forehead and mid back.

## 2024-06-13 NOTE — ADDENDUM
[FreeTextEntry1] :  Documented by Laine Hernandes acting as a scribe for Dr. Bella Wilder on 6/13/24. All medical record entries made by the Scribe were at my, Dr. Bella Wilder, direction and personally dictated by me on 6/13/24. I have reviewed the chart and agree that the record accurately reflects my personal performance of the history, physical exam, assessment and plan. I have also personally directed, reviewed, and agree with the discharge instructions.

## 2024-06-13 NOTE — HISTORY OF PRESENT ILLNESS
[de-identified] : 36 y/o M with no significant PMHx diagnosed with very severe aplastic anemia in April 2020. H confirmed on bone marrow biopsy at [facility] and now s/p inpatient treatment beginning 4/23/2020 with horse ATG + CsA + promacta with complete remission. Bone marrow bx on 11/9/20 revealed hypocellular marrow, overall cellularity improved (20-50%) showing marrow regeneration. BMBx on 5/18/2021 showing normal morphology with normal cellularity. Unfortunately patient relapsed in November 2021 with worsening cytopenias and neutropenic fever. He was admitted for treatment with rabbit ATG, prednisone, cyclosporine and Promacta with partial response  starting 12/27/21. 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 He reports doing well with ravulizumab and is able to continue working full time.       PMHX: Asthma    [de-identified] : This is a 37 year old male with severe aplastic anemia admitted for a haplo-identical bone marrow from his sister (6/12) with Flu / Cy / TBI prep regimen. Hematologic history as follows: initially diagnosed by bone marrow biopsy 4/20, began treatment 4/23/20 with horse ATG + CSA + promacta and achieved CR1. A bone marrow biopsy 11/9/20 showed hypocellular marrow, with overall improved cellularity showing marrow regeneration. Bone marrow biopsy 5/18/21 showed normal morphology and normal cellularity. He relapsed 11/2021, developing worsening cytopenias and neutropenic fevers. He was then treated with rabbit ATG, prednisone, CSA and promacta with partial response 12/27/21.  He later developed worsening hemolysis and pancytopenia, with a rising PNH clone and was treated with Ravulizumab.   Upon admission, a TLC was placed in IR. Mr. Puckett received IV hydration, pain management, nutritional support, and antibacterial / antiviral / antifungal / VOD / GI and PCP prophylaxis. Labs were monitored on a daily basis, and he received transfusional support and electrolyte repletion as needed. While admitted, Mr. Puckett experienced pancytopenia related to the high dose chemotherapy prep regimen. When he became neutropenic, he was started on prophylactic ciprofloxacin. He also had neutropenic fevers. When he became febrile, blood and urine cultures were sent, a CXR completed the ciprofloxacin was changed to cefepime.   On 6/8/23, after pre-medication Mr. Puckett received 548 mL of allogeneic, related, fresh, pooled, plasma reduced, haplo-identical HPC marrow for over approximately 90 min. Cell counts as follows  Total MNC( x10^8/kg)=21.08  CD34+cells ( x10^6 kg)=1.65  CD3+cells( x10^7)=2.94  Cell Viability (%)= 99  During the infusion Mr. Puckett developed hives and required additional dose  of benadryl and hydrocortisone injection.   Post transplant, on 6/12/23 he experienced a fever attributed to CRS post transplant. He was treated with a dose of tocilizumab. On 6/20/23, he developed clinical sinusitis, the cefepime was changed to zosyn. He completed a 10 day course. Pansinusitis was confirmed on a CT maxillofacial / orbit. Opthalmology was consulted on 6/21/23 for eye pain. Mr. Puckett also experienced chemotherapy induced diarrhea. C. diff was negative on 6/11/23.   Engraftment was noted on 6/26/23. The daily zarxio was discontinued on 6/30/23, as was the zosyn. Post engraftment a FISH for Y was sent to determine chimerism, results are pending. A CMV PCR was sent as well. Currently, Mr. Puckett is stable for discharge home with outpatient follow up at the [facility].   On 7/5/23 visit, day + 27 post Haplo identical bone marrow transplant from his sister on 6/8/23. Overall fatigued but, feels well overall. Felt bumps on bilateral hands over the weekend. Right index finger hyperpigmented. On Cellcept 1000 mg TID and FK 4.5 mg BID. Did not take FK prior to blood work today. Denies fever, chills, nausea, vomiting, diarrhea, rash, mouth sores, dysuria or any signs of active bleeding. Denies SOB, chest pain or B/L LE edema. Remains compliant with post transplant medications. Remains compliant with post transplant diet and crowd restrictions.   On 7/19/23 visit, day + 46 post Allogeneic PBSCT. On Cellcept 1000 mg QD and FK 4.5 mg BID. Overall well with no acute concerns. Complains of fatigue and pain behind the left eye. Denies any vision changes. Denies fever, chills, nausea, vomiting, diarrhea, rash, mouth sores, dysuria or any signs of active bleeding. Denies SOB, chest pain or B/L LE edema. Remains compliant with post transplant medications. Remains compliant with post transplant diet and crowd restrictions.   8/3/23:Today is Day + 60 post Allogeneic PBSCT.Denies fever, chills, nausea,vomiting, diarrhea, SOB,chest pain,edema, rash,mouth sores,dysuria or any signs of active bleeding.On FK 4.5mg 2 x day.He is off Cellcept since last week.Remains compliant with medications as prescribed. Remains compliant with diet and crowd restrictions. Describes occ double vision  8/11/23: Patient is seen for a follow up visit. Today is Day +64.Overall feels well with no acute concerns. Denies fever, chills,SOB,chest pain, edema, rash, mouth sores,nausea, vomiting, diarrhea or any signs of active bleeding. On FK 5mg 2 x day. Remains compliant with post transplantm edications. Remains compliant with diet and crowd restrictions.  On 9/7//23, patient presents for a follow up visit. Overall well and offers no acute concerns. Mild erythema of face resolved. On FK 5 mg BID. Denies fever, nausea, vomiting, diarrhea, mouth sores, dysuria or any signs of active bleeding. Denies SOB, chest pain or B/L LE edema.   9/29/23:Today is Day +113.Denies fever, chills, edema, SOB,chest pain,nausea,vomiting, diarrhea or any signs of active bleeding.On FK 5mg2 x day.Has mild resolving rash on forehead.Remains compliant with medications as prescribed.   10/17/23: Patient presents for a follow up visit. Overall, well and offers no acute concerns. Denies fever, chills, nausea, vomiting, diarrhea, rash, mouth sores or any signs of active bleeding. Denies SOB, chest pain or B/L LE edema. Patient reports dry skin, steroid cream helps.  11/21/23: Patient is here for a follow up visit. Denies fever, chills, nausea, vomiting, diarrhea, mouth sores or any signs of active bleeding. Denies SOB, chest pain or B/L LE edema. Patient had a rash throughout body, prescribed ointment helped. Patient complains of dry scalp.   12/19/23: Patient is seen for a follow up visit. Denies fever, chills, nausea, vomiting, diarrhea. mouth sores or any signs of active bleeding. He has healing rash on back and forehead. On FK 4mg BID.  1/30/24:  Patient is here for a follow up visit. Denies fever, chills, nausea, vomiting, diarrhea, mouth sores or any signs of active bleeding. Denies chest pain or B/L LE edema.  Dry skin and rash on face, scalp, underarms, as well as hair thinning on face and underarms. Pt states some nights he cannot sleep due to the rash being itchy. Some SOB when going up and down stairs in the last 2 weeks. On FK 3 mg BID.  4/18/24:  Patient is here for a follow up visit. Denies fever, chills, nausea, vomiting, diarrhea, mouth sores or any signs of active bleeding. Denies SOB, chest pain or B/L LE edema. On FK 2.5 mg bid. Pt monitors blood pressure at home. Itchy rash persists on arms, stomach, back, face. Complains of dry eyes and hair thinning.   6/13/24:  Patient is here for a follow up visit. Denies fever, chills, vomiting, diarrhea, mouth sores or any signs of active bleeding. Denies SOB, chest pain or B/L LE edema. On FK 1.5 mg bid and Jakafi 5mg bid. Pt complains of occ nausea, dry eyes, rash on back and face improved.

## 2024-06-17 DIAGNOSIS — D59.5: ICD-10-CM

## 2024-06-17 LAB
CMV DNA SPEC QL NAA+PROBE: NOT DETECTED IU/ML
CMVPCR LOG: NOT DETECTED LOG10IU/ML

## 2024-09-01 ENCOUNTER — OUTPATIENT (OUTPATIENT)
Dept: OUTPATIENT SERVICES | Facility: HOSPITAL | Age: 39
LOS: 1 days | Discharge: HOME | End: 2024-09-01

## 2024-09-01 DIAGNOSIS — D61.9 APLASTIC ANEMIA, UNSPECIFIED: ICD-10-CM

## 2024-09-01 DIAGNOSIS — Z98.890 OTHER SPECIFIED POSTPROCEDURAL STATES: Chronic | ICD-10-CM

## 2024-09-12 ENCOUNTER — RESULT REVIEW (OUTPATIENT)
Age: 39
End: 2024-09-12

## 2024-09-12 ENCOUNTER — APPOINTMENT (OUTPATIENT)
Dept: HEMATOLOGY ONCOLOGY | Facility: CLINIC | Age: 39
End: 2024-09-12
Payer: COMMERCIAL

## 2024-09-12 ENCOUNTER — APPOINTMENT (OUTPATIENT)
Dept: HEMATOLOGY ONCOLOGY | Facility: CLINIC | Age: 39
End: 2024-09-12

## 2024-09-12 VITALS
RESPIRATION RATE: 16 BRPM | DIASTOLIC BLOOD PRESSURE: 92 MMHG | SYSTOLIC BLOOD PRESSURE: 133 MMHG | TEMPERATURE: 98.4 F | HEART RATE: 73 BPM | WEIGHT: 215.39 LBS | OXYGEN SATURATION: 99 % | BODY MASS INDEX: 32.8 KG/M2

## 2024-09-12 DIAGNOSIS — Z94.81 BONE MARROW TRANSPLANT STATUS: ICD-10-CM

## 2024-09-12 DIAGNOSIS — D61.9 APLASTIC ANEMIA, UNSPECIFIED: ICD-10-CM

## 2024-09-12 LAB
ALBUMIN SERPL ELPH-MCNC: 4.6 G/DL
ALP BLD-CCNC: 110 U/L
ALT SERPL-CCNC: 30 U/L
ANION GAP SERPL CALC-SCNC: 10 MMOL/L
AST SERPL-CCNC: 26 U/L
BASOPHILS # BLD AUTO: 0.03 K/UL — SIGNIFICANT CHANGE UP (ref 0–0.2)
BASOPHILS NFR BLD AUTO: 0.4 % — SIGNIFICANT CHANGE UP (ref 0–2)
BILIRUB SERPL-MCNC: 0.5 MG/DL
BUN SERPL-MCNC: 12 MG/DL
CALCIUM SERPL-MCNC: 10.4 MG/DL
CHLORIDE SERPL-SCNC: 103 MMOL/L
CO2 SERPL-SCNC: 26 MMOL/L
CREAT SERPL-MCNC: 0.89 MG/DL
EGFR: 114 ML/MIN/1.73M2
EOSINOPHIL # BLD AUTO: 0.36 K/UL — SIGNIFICANT CHANGE UP (ref 0–0.5)
EOSINOPHIL NFR BLD AUTO: 4.8 % — SIGNIFICANT CHANGE UP (ref 0–6)
GLUCOSE SERPL-MCNC: 96 MG/DL
HCT VFR BLD CALC: 43.6 % — SIGNIFICANT CHANGE UP (ref 39–50)
HGB BLD-MCNC: 15.6 G/DL — SIGNIFICANT CHANGE UP (ref 13–17)
IMM GRANULOCYTES NFR BLD AUTO: 0.1 % — SIGNIFICANT CHANGE UP (ref 0–0.9)
LDH SERPL-CCNC: 216 U/L
LYMPHOCYTES # BLD AUTO: 3.42 K/UL — HIGH (ref 1–3.3)
LYMPHOCYTES # BLD AUTO: 45.8 % — HIGH (ref 13–44)
MAGNESIUM SERPL-MCNC: 2.4 MG/DL
MCHC RBC-ENTMCNC: 33.2 PG — SIGNIFICANT CHANGE UP (ref 27–34)
MCHC RBC-ENTMCNC: 35.8 G/DL — SIGNIFICANT CHANGE UP (ref 32–36)
MCV RBC AUTO: 92.8 FL — SIGNIFICANT CHANGE UP (ref 80–100)
MONOCYTES # BLD AUTO: 0.66 K/UL — SIGNIFICANT CHANGE UP (ref 0–0.9)
MONOCYTES NFR BLD AUTO: 8.8 % — SIGNIFICANT CHANGE UP (ref 2–14)
NEUTROPHILS # BLD AUTO: 2.98 K/UL — SIGNIFICANT CHANGE UP (ref 1.8–7.4)
NEUTROPHILS NFR BLD AUTO: 40.1 % — LOW (ref 43–77)
NRBC # BLD: 0 /100 WBCS — SIGNIFICANT CHANGE UP (ref 0–0)
PLATELET # BLD AUTO: 275 K/UL — SIGNIFICANT CHANGE UP (ref 150–400)
POTASSIUM SERPL-SCNC: 4.7 MMOL/L
PROT SERPL-MCNC: 8.2 G/DL
RBC # BLD: 4.7 M/UL — SIGNIFICANT CHANGE UP (ref 4.2–5.8)
RBC # FLD: 11.8 % — SIGNIFICANT CHANGE UP (ref 10.3–14.5)
SODIUM SERPL-SCNC: 140 MMOL/L
WBC # BLD: 7.46 K/UL — SIGNIFICANT CHANGE UP (ref 3.8–10.5)
WBC # FLD AUTO: 7.46 K/UL — SIGNIFICANT CHANGE UP (ref 3.8–10.5)

## 2024-09-12 PROCEDURE — 99215 OFFICE O/P EST HI 40 MIN: CPT

## 2024-09-13 LAB
CMV DNA SPEC QL NAA+PROBE: NOT DETECTED IU/ML
CMVPCR LOG: NOT DETECTED LOG10IU/ML

## 2024-09-13 NOTE — REVIEW OF SYSTEMS
[Fatigue] : fatigue [Skin Rash] : skin rash [Negative] : Allergic/Immunologic [Fever] : no fever [Chills] : no chills [Night Sweats] : no night sweats [Recent Change In Weight] : ~T no recent weight change [Eye Pain] : no eye pain [Red Eyes] : eyes not red [Dry Eyes] : no dryness of the eyes [Vision Problems] : no vision problems [Skin Wound] : no skin wound [de-identified] :  Right index finger hyperpigmented, Mild resolving rash of forehead and mid back.

## 2024-09-13 NOTE — PHYSICAL EXAM
[Restricted in physically strenuous activity but ambulatory and able to carry out work of a light or sedentary nature] : Status 1- Restricted in physically strenuous activity but ambulatory and able to carry out work of a light or sedentary nature, e.g., light house work, office work [Normal] : affect appropriate [Yes] : Yes [] :  [Maculopapular rash < 25% BSA] : Skin: Maculopapular rash < 25% BSA [< 2 mg/dl] : Liver: < 2 mg/dl [No or intermittent] : Upper GI: No or intermittent nausea, vomiting or anorexia [<500 ml/day or < 3 episodes/day] : Lower GI (stool output/day): <500 ml/day or <3 episodes/day [I] : I [No] : No [de-identified] :  skin hyperpigmented..few acne like lesions on face, mild resolving rash of face, dry scalp [FreeTextEntry1] : 06/08/2023 [FreeTextEntry4] : Off Cellcept

## 2024-09-13 NOTE — HISTORY OF PRESENT ILLNESS
[de-identified] : 38 y/o M with no significant PMHx diagnosed with very severe aplastic anemia in April 2020. H confirmed on bone marrow biopsy at [facility] and now s/p inpatient treatment beginning 4/23/2020 with horse ATG + CsA + promacta with complete remission. Bone marrow bx on 11/9/20 revealed hypocellular marrow, overall cellularity improved (20-50%) showing marrow regeneration. BMBx on 5/18/2021 showing normal morphology with normal cellularity. Unfortunately patient relapsed in November 2021 with worsening cytopenias and neutropenic fever. He was admitted for treatment with rabbit ATG, prednisone, cyclosporine and Promacta with partial response  starting 12/27/21. 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 He reports doing well with ravulizumab and is able to continue working full time.       PMHX: Asthma    [de-identified] : This is a 37 year old male with severe aplastic anemia admitted for a haplo-identical bone marrow from his sister (6/12) with Flu / Cy / TBI prep regimen. Hematologic history as follows: initially diagnosed by bone marrow biopsy 4/20, began treatment 4/23/20 with horse ATG + CSA + promacta and achieved CR1. A bone marrow biopsy 11/9/20 showed hypocellular marrow, with overall improved cellularity showing marrow regeneration. Bone marrow biopsy 5/18/21 showed normal morphology and normal cellularity. He relapsed 11/2021, developing worsening cytopenias and neutropenic fevers. He was then treated with rabbit ATG, prednisone, CSA and promacta with partial response 12/27/21.  He later developed worsening hemolysis and pancytopenia, with a rising PNH clone and was treated with Ravulizumab.   Upon admission, a TLC was placed in IR. Mr. Puckett received IV hydration, pain management, nutritional support, and antibacterial / antiviral / antifungal / VOD / GI and PCP prophylaxis. Labs were monitored on a daily basis, and he received transfusional support and electrolyte repletion as needed. While admitted, Mr. Puckett experienced pancytopenia related to the high dose chemotherapy prep regimen. When he became neutropenic, he was started on prophylactic ciprofloxacin. He also had neutropenic fevers. When he became febrile, blood and urine cultures were sent, a CXR completed the ciprofloxacin was changed to cefepime.   On 6/8/23, after pre-medication Mr. Puckett received 548 mL of allogeneic, related, fresh, pooled, plasma reduced, haplo-identical HPC marrow for over approximately 90 min. Cell counts as follows  Total MNC( x10^8/kg)=21.08  CD34+cells ( x10^6 kg)=1.65  CD3+cells( x10^7)=2.94  Cell Viability (%)= 99  During the infusion Mr. Puckett developed hives and required additional dose  of benadryl and hydrocortisone injection.   Post transplant, on 6/12/23 he experienced a fever attributed to CRS post transplant. He was treated with a dose of tocilizumab. On 6/20/23, he developed clinical sinusitis, the cefepime was changed to zosyn. He completed a 10 day course. Pansinusitis was confirmed on a CT maxillofacial / orbit. Opthalmology was consulted on 6/21/23 for eye pain. Mr. Puckett also experienced chemotherapy induced diarrhea. C. diff was negative on 6/11/23.   Engraftment was noted on 6/26/23. The daily zarxio was discontinued on 6/30/23, as was the zosyn. Post engraftment a FISH for Y was sent to determine chimerism, results are pending. A CMV PCR was sent as well. Currently, Mr. Puckett is stable for discharge home with outpatient follow up at the [facility].   On 7/5/23 visit, day + 27 post Haplo identical bone marrow transplant from his sister on 6/8/23. Overall fatigued but, feels well overall. Felt bumps on bilateral hands over the weekend. Right index finger hyperpigmented. On Cellcept 1000 mg TID and FK 4.5 mg BID. Did not take FK prior to blood work today. Denies fever, chills, nausea, vomiting, diarrhea, rash, mouth sores, dysuria or any signs of active bleeding. Denies SOB, chest pain or B/L LE edema. Remains compliant with post transplant medications. Remains compliant with post transplant diet and crowd restrictions.   On 7/19/23 visit, day + 46 post Allogeneic PBSCT. On Cellcept 1000 mg QD and FK 4.5 mg BID. Overall well with no acute concerns. Complains of fatigue and pain behind the left eye. Denies any vision changes. Denies fever, chills, nausea, vomiting, diarrhea, rash, mouth sores, dysuria or any signs of active bleeding. Denies SOB, chest pain or B/L LE edema. Remains compliant with post transplant medications. Remains compliant with post transplant diet and crowd restrictions.   8/3/23:Today is Day + 60 post Allogeneic PBSCT.Denies fever, chills, nausea,vomiting, diarrhea, SOB,chest pain,edema, rash,mouth sores,dysuria or any signs of active bleeding.On FK 4.5mg 2 x day.He is off Cellcept since last week.Remains compliant with medications as prescribed. Remains compliant with diet and crowd restrictions. Describes occ double vision  8/11/23: Patient is seen for a follow up visit. Today is Day +64.Overall feels well with no acute concerns. Denies fever, chills,SOB,chest pain, edema, rash, mouth sores,nausea, vomiting, diarrhea or any signs of active bleeding. On FK 5mg 2 x day. Remains compliant with post transplantm edications. Remains compliant with diet and crowd restrictions.  On 9/7//23, patient presents for a follow up visit. Overall well and offers no acute concerns. Mild erythema of face resolved. On FK 5 mg BID. Denies fever, nausea, vomiting, diarrhea, mouth sores, dysuria or any signs of active bleeding. Denies SOB, chest pain or B/L LE edema.   9/29/23:Today is Day +113.Denies fever, chills, edema, SOB,chest pain,nausea,vomiting, diarrhea or any signs of active bleeding.On FK 5mg2 x day.Has mild resolving rash on forehead.Remains compliant with medications as prescribed.   10/17/23: Patient presents for a follow up visit. Overall, well and offers no acute concerns. Denies fever, chills, nausea, vomiting, diarrhea, rash, mouth sores or any signs of active bleeding. Denies SOB, chest pain or B/L LE edema. Patient reports dry skin, steroid cream helps.  11/21/23: Patient is here for a follow up visit. Denies fever, chills, nausea, vomiting, diarrhea, mouth sores or any signs of active bleeding. Denies SOB, chest pain or B/L LE edema. Patient had a rash throughout body, prescribed ointment helped. Patient complains of dry scalp.   12/19/23: Patient is seen for a follow up visit. Denies fever, chills, nausea, vomiting, diarrhea. mouth sores or any signs of active bleeding. He has healing rash on back and forehead. On FK 4mg BID.  1/30/24:  Patient is here for a follow up visit. Denies fever, chills, nausea, vomiting, diarrhea, mouth sores or any signs of active bleeding. Denies chest pain or B/L LE edema.  Dry skin and rash on face, scalp, underarms, as well as hair thinning on face and underarms. Pt states some nights he cannot sleep due to the rash being itchy. Some SOB when going up and down stairs in the last 2 weeks. On FK 3 mg BID.  4/18/24:  Patient is here for a follow up visit. Denies fever, chills, nausea, vomiting, diarrhea, mouth sores or any signs of active bleeding. Denies SOB, chest pain or B/L LE edema. On FK 2.5 mg bid. Pt monitors blood pressure at home. Itchy rash persists on arms, stomach, back, face. Complains of dry eyes and hair thinning.   6/13/24:  Patient is here for a follow up visit. Denies fever, chills, vomiting, diarrhea, mouth sores or any signs of active bleeding. Denies SOB, chest pain or B/L LE edema. On FK 1.5 mg bid and Jakafi 5mg bid. Pt complains of occ nausea, dry eyes, rash on back and face improved.   9/12/24:  Patient is here for a follow up visit. Denies fever, chills, nausea, vomiting, diarrhea, rash, mouth sores or any signs of active bleeding. Denies SOB, chest pain or B/L LE edema. On FK 0.5 bid and Jakafi 10 mg BID. Pt states he feels well overall, the skin and eyes have improved.

## 2024-09-13 NOTE — REVIEW OF SYSTEMS
[Fatigue] : fatigue [Skin Rash] : skin rash [Negative] : Allergic/Immunologic [Fever] : no fever [Chills] : no chills [Night Sweats] : no night sweats [Recent Change In Weight] : ~T no recent weight change [Eye Pain] : no eye pain [Red Eyes] : eyes not red [Dry Eyes] : no dryness of the eyes [Vision Problems] : no vision problems [Skin Wound] : no skin wound [de-identified] :  Right index finger hyperpigmented, Mild resolving rash of forehead and mid back.

## 2024-09-13 NOTE — HISTORY OF PRESENT ILLNESS
[de-identified] : 38 y/o M with no significant PMHx diagnosed with very severe aplastic anemia in April 2020. H confirmed on bone marrow biopsy at [facility] and now s/p inpatient treatment beginning 4/23/2020 with horse ATG + CsA + promacta with complete remission. Bone marrow bx on 11/9/20 revealed hypocellular marrow, overall cellularity improved (20-50%) showing marrow regeneration. BMBx on 5/18/2021 showing normal morphology with normal cellularity. Unfortunately patient relapsed in November 2021 with worsening cytopenias and neutropenic fever. He was admitted for treatment with rabbit ATG, prednisone, cyclosporine and Promacta with partial response  starting 12/27/21. 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 He reports doing well with ravulizumab and is able to continue working full time.       PMHX: Asthma    [de-identified] : This is a 37 year old male with severe aplastic anemia admitted for a haplo-identical bone marrow from his sister (6/12) with Flu / Cy / TBI prep regimen. Hematologic history as follows: initially diagnosed by bone marrow biopsy 4/20, began treatment 4/23/20 with horse ATG + CSA + promacta and achieved CR1. A bone marrow biopsy 11/9/20 showed hypocellular marrow, with overall improved cellularity showing marrow regeneration. Bone marrow biopsy 5/18/21 showed normal morphology and normal cellularity. He relapsed 11/2021, developing worsening cytopenias and neutropenic fevers. He was then treated with rabbit ATG, prednisone, CSA and promacta with partial response 12/27/21.  He later developed worsening hemolysis and pancytopenia, with a rising PNH clone and was treated with Ravulizumab.   Upon admission, a TLC was placed in IR. Mr. Puckett received IV hydration, pain management, nutritional support, and antibacterial / antiviral / antifungal / VOD / GI and PCP prophylaxis. Labs were monitored on a daily basis, and he received transfusional support and electrolyte repletion as needed. While admitted, Mr. Puckett experienced pancytopenia related to the high dose chemotherapy prep regimen. When he became neutropenic, he was started on prophylactic ciprofloxacin. He also had neutropenic fevers. When he became febrile, blood and urine cultures were sent, a CXR completed the ciprofloxacin was changed to cefepime.   On 6/8/23, after pre-medication Mr. Puckett received 548 mL of allogeneic, related, fresh, pooled, plasma reduced, haplo-identical HPC marrow for over approximately 90 min. Cell counts as follows  Total MNC( x10^8/kg)=21.08  CD34+cells ( x10^6 kg)=1.65  CD3+cells( x10^7)=2.94  Cell Viability (%)= 99  During the infusion Mr. Puckett developed hives and required additional dose  of benadryl and hydrocortisone injection.   Post transplant, on 6/12/23 he experienced a fever attributed to CRS post transplant. He was treated with a dose of tocilizumab. On 6/20/23, he developed clinical sinusitis, the cefepime was changed to zosyn. He completed a 10 day course. Pansinusitis was confirmed on a CT maxillofacial / orbit. Opthalmology was consulted on 6/21/23 for eye pain. Mr. Puckett also experienced chemotherapy induced diarrhea. C. diff was negative on 6/11/23.   Engraftment was noted on 6/26/23. The daily zarxio was discontinued on 6/30/23, as was the zosyn. Post engraftment a FISH for Y was sent to determine chimerism, results are pending. A CMV PCR was sent as well. Currently, Mr. Puckett is stable for discharge home with outpatient follow up at the [facility].   On 7/5/23 visit, day + 27 post Haplo identical bone marrow transplant from his sister on 6/8/23. Overall fatigued but, feels well overall. Felt bumps on bilateral hands over the weekend. Right index finger hyperpigmented. On Cellcept 1000 mg TID and FK 4.5 mg BID. Did not take FK prior to blood work today. Denies fever, chills, nausea, vomiting, diarrhea, rash, mouth sores, dysuria or any signs of active bleeding. Denies SOB, chest pain or B/L LE edema. Remains compliant with post transplant medications. Remains compliant with post transplant diet and crowd restrictions.   On 7/19/23 visit, day + 46 post Allogeneic PBSCT. On Cellcept 1000 mg QD and FK 4.5 mg BID. Overall well with no acute concerns. Complains of fatigue and pain behind the left eye. Denies any vision changes. Denies fever, chills, nausea, vomiting, diarrhea, rash, mouth sores, dysuria or any signs of active bleeding. Denies SOB, chest pain or B/L LE edema. Remains compliant with post transplant medications. Remains compliant with post transplant diet and crowd restrictions.   8/3/23:Today is Day + 60 post Allogeneic PBSCT.Denies fever, chills, nausea,vomiting, diarrhea, SOB,chest pain,edema, rash,mouth sores,dysuria or any signs of active bleeding.On FK 4.5mg 2 x day.He is off Cellcept since last week.Remains compliant with medications as prescribed. Remains compliant with diet and crowd restrictions. Describes occ double vision  8/11/23: Patient is seen for a follow up visit. Today is Day +64.Overall feels well with no acute concerns. Denies fever, chills,SOB,chest pain, edema, rash, mouth sores,nausea, vomiting, diarrhea or any signs of active bleeding. On FK 5mg 2 x day. Remains compliant with post transplantm edications. Remains compliant with diet and crowd restrictions.  On 9/7//23, patient presents for a follow up visit. Overall well and offers no acute concerns. Mild erythema of face resolved. On FK 5 mg BID. Denies fever, nausea, vomiting, diarrhea, mouth sores, dysuria or any signs of active bleeding. Denies SOB, chest pain or B/L LE edema.   9/29/23:Today is Day +113.Denies fever, chills, edema, SOB,chest pain,nausea,vomiting, diarrhea or any signs of active bleeding.On FK 5mg2 x day.Has mild resolving rash on forehead.Remains compliant with medications as prescribed.   10/17/23: Patient presents for a follow up visit. Overall, well and offers no acute concerns. Denies fever, chills, nausea, vomiting, diarrhea, rash, mouth sores or any signs of active bleeding. Denies SOB, chest pain or B/L LE edema. Patient reports dry skin, steroid cream helps.  11/21/23: Patient is here for a follow up visit. Denies fever, chills, nausea, vomiting, diarrhea, mouth sores or any signs of active bleeding. Denies SOB, chest pain or B/L LE edema. Patient had a rash throughout body, prescribed ointment helped. Patient complains of dry scalp.   12/19/23: Patient is seen for a follow up visit. Denies fever, chills, nausea, vomiting, diarrhea. mouth sores or any signs of active bleeding. He has healing rash on back and forehead. On FK 4mg BID.  1/30/24:  Patient is here for a follow up visit. Denies fever, chills, nausea, vomiting, diarrhea, mouth sores or any signs of active bleeding. Denies chest pain or B/L LE edema.  Dry skin and rash on face, scalp, underarms, as well as hair thinning on face and underarms. Pt states some nights he cannot sleep due to the rash being itchy. Some SOB when going up and down stairs in the last 2 weeks. On FK 3 mg BID.  4/18/24:  Patient is here for a follow up visit. Denies fever, chills, nausea, vomiting, diarrhea, mouth sores or any signs of active bleeding. Denies SOB, chest pain or B/L LE edema. On FK 2.5 mg bid. Pt monitors blood pressure at home. Itchy rash persists on arms, stomach, back, face. Complains of dry eyes and hair thinning.   6/13/24:  Patient is here for a follow up visit. Denies fever, chills, vomiting, diarrhea, mouth sores or any signs of active bleeding. Denies SOB, chest pain or B/L LE edema. On FK 1.5 mg bid and Jakafi 5mg bid. Pt complains of occ nausea, dry eyes, rash on back and face improved.   9/12/24:  Patient is here for a follow up visit. Denies fever, chills, nausea, vomiting, diarrhea, rash, mouth sores or any signs of active bleeding. Denies SOB, chest pain or B/L LE edema. On FK 0.5 bid and Jakafi 10 mg BID. Pt states he feels well overall, the skin and eyes have improved.

## 2024-09-13 NOTE — ASSESSMENT
[FreeTextEntry1] : Aplastic anemia (284.9) (D61.9) S/P allogeneic bone marrow transplant (V42.81) (Z94.81) This is a 38 year old male with severe aplastic anemia admitted for a haplo-identical bone marrow from his sister (6/12) with Flu / Cy / TBI prep regimen. Hematologic history as follows: initially diagnosed by bone marrow biopsy 4/20, began treatment 4/23/20 with horse ATG + CSA + promacta and achieved CR1. A bone marrow biopsy 11/9/20 showed hypocellular marrow, with overall improved cellularity showing marrow regeneration. Bone marrow biopsy 5/18/21 showed normal morphology and normal cellularity. He relapsed 11/2021, developing worsening cytopenias and neutropenic fevers. He was then treated with rabbit ATG, prednisone, CSA and promacta with partial response 12/27/21. He later developed worsening hemolysis and pancytopenia, with a rising PNH clone and was treated with Ravulizumab. Status post Haplo identical bone marrow transplant from his sister on 6/8/23.  1) Severe aplastic anemia. -Status post Haplo identical bone marrow transplant from his sister on 6/8/23. -7/3/23: FISH for Y= 96% donor  7/10/23: FISH for Y = 99.5% donor 8/3/23: FISH for Y =100% donor 9/7/23:FISH for Y = 99% donor cells 9/29/23: FISH for Y = 98.5% 11/7/23: FISH for Y = 97.5% 11/21/23:FISH for Y = 97% donor cells 12/19/23: FISH for Y = 97.5% donor cells 1/30/24: FISH for Y = 98.5% donor cells 4/18/24: FISH for Y = 99% donor cells 8/18/24: FISH for Y = 98% donor cells  2) Heme Counts stable. No indication for transfusions today. Can stop folic acid 1 mg oral tablet: 1 tab(s) orally once a day and Multiple Vitamins oral tablet: 1 tab(s) orally once a day.  3) ID Continue ppx: Acyclovir 400 mg oral tablet: 1 tab(s) orally every 12 hours Atovaquone 750 mg/5 mL oral suspension: 5 milliliter(s) orally 2 times a day Fluconazole 200 mg oral tablet: 2 tab(s) orally once a day- HOLD as of 7/10/23. off Letermovir 480 mg oral tablet: 1 tab(s) orally once a day. - can STOP when finishes supply..off  GVHD Skin 1 Liver 0 GI 0- overall grade 0...overall 1 Rash on forehead and mid back .. sees dermatologist..follow closely On FK 4 mg BID since 12/11/23.....will decrease to 3 mg bid, 1/30/24 taper to 2.5 mg bid, 4/18/24 taper to 2.0 mg bid for 2 weeks, then 1.5 mg bid  On Cellcept to 1000 mg BID. On 7/15/23, decrease cellcept to 1000 mg daily. OFF SINCE Week of 7/17/23 Reviewed signs and symptoms of GVHD. 4/18/24: Ordered Jakafi 5mg bid  6/13/24: Decrease FK to 1mg bid, after 3 weeks decrease to 0.5 bid. Increase Jakafi to 10 mg bid.  9/12/24: decrease FK to 0.5 mg once a day on odd days and twice a day on even days for 2 weeks, then decrease to 0.5 mg once a day starting 9/26/24 for 2 weeks, as of 10/10/24 decrease to 0.5 every other day for 2 weeks, then stop on 10/24/24   7/5/23 CMV PCR negative. Continue to monitor weekly. 7/19/23:CMV PCR negative 8/3/23: CMV PCR-<34.5 IU/Ml 9/7/23:CMV PCR negative 11/21/23: CMV negative  4) GI Continue ppx: Ondansetron 8 mg oral tablet: 1 tab(s) orally every 8 hours as needed for nausea Pepcid 20 mg oral tablet: 1 tab(s) orally 2 times a day  Transaminitis: 7/3/23: AST 47, , Alk Phos 115. Continue to monitor HOLD fluconazole as of 7/10/23 7/27/23: AST 41  AlkPhos 106.....continue to HOLD Fluconazole Continue to HOLD Fluconazole  5) Other Continue: Magnesium oxide 400 mg oral tablet: 1 tab(s) orally 3 times a day (with meals) Ursodiol 300 mg oral capsule: 1 cap(s) orally 2 times a day Eye Pain / occ double vision- Continue to monitor. head ct ordered-RESOLVED Mild erythema of face- prescribed clobetasol on 8/17/23. resolved...1/30/24 does wax and wane Derm prescribed triamcinolone acetonide for rash...Dr. Wilder prescribed atarax 4/18/24  6) Plan Continue post transplant diet and crowd restrictions. BMB Day 120-150 - trilineage hematopoiesis, fish normal Notify your physician if bleeding; swelling; persistent nausea and vomiting; unable to urinate; pain not relieved by medications; fever; numbness, tingling; excessive diarrhea, inability to tolerate liquids or foods; increased irritability or sluggishness, rash Discussed post-transplant vaccinations ..will arrange ---- Dec 2024 Week 1 (Prevnar, Pentacel & Menveo) & Week 2 Vaccines (HEP A, B) Recommend pt to see Dr. Sinclair for routine optho check up post transplant .. to use artificial tears in the meantime Will order repeat lung function test Discussed the tapering side effects of Tacrolimus and GVHD ...discussed the d/c of jakafi in the future Follow up in 3 months with Dr. Wilder

## 2024-09-13 NOTE — PHYSICAL EXAM
[Restricted in physically strenuous activity but ambulatory and able to carry out work of a light or sedentary nature] : Status 1- Restricted in physically strenuous activity but ambulatory and able to carry out work of a light or sedentary nature, e.g., light house work, office work [Normal] : affect appropriate [Yes] : Yes [] :  [Maculopapular rash < 25% BSA] : Skin: Maculopapular rash < 25% BSA [< 2 mg/dl] : Liver: < 2 mg/dl [No or intermittent] : Upper GI: No or intermittent nausea, vomiting or anorexia [<500 ml/day or < 3 episodes/day] : Lower GI (stool output/day): <500 ml/day or <3 episodes/day [I] : I [No] : No [de-identified] :  skin hyperpigmented..few acne like lesions on face, mild resolving rash of face, dry scalp [FreeTextEntry1] : 06/08/2023 [FreeTextEntry4] : Off Cellcept

## 2024-09-13 NOTE — ADDENDUM
[FreeTextEntry1] : Documented by Zan Conway acting as a scribe for Dr. Bella Wilder on 9/12/24. All medical record entries made by the Scribe were at my, Dr. Bella Wilder, direction and personally dictated by me on 9/12/24. I have reviewed the chart and agree that the record accurately reflects my personal performance of the history, physical exam, assessment and plan. I have also personally directed, reviewed, and agree with the discharge instructions.

## 2024-10-14 ENCOUNTER — APPOINTMENT (OUTPATIENT)
Dept: OPHTHALMOLOGY | Facility: CLINIC | Age: 39
End: 2024-10-14
Payer: COMMERCIAL

## 2024-10-14 ENCOUNTER — NON-APPOINTMENT (OUTPATIENT)
Age: 39
End: 2024-10-14

## 2024-10-14 PROCEDURE — 95060 OPH MUCOUS MEMBRANE TESTS: CPT

## 2024-10-14 PROCEDURE — 92014 COMPRE OPH EXAM EST PT 1/>: CPT

## 2024-10-15 NOTE — ED ADULT NURSE NOTE - CAS DISCH TRANSFER METHOD
Pt arrives in wheelchair, had bilateral ears cleaned yesterday. Today there is noted bleeding to both ears, right ear unable to hear out of. Normally does wear hearing aids, very [place] otherwise. Takes eliquis. A&O x2, family at triage.    Ambulance

## 2024-10-18 ENCOUNTER — APPOINTMENT (OUTPATIENT)
Dept: FAMILY MEDICINE | Facility: CLINIC | Age: 39
End: 2024-10-18
Payer: COMMERCIAL

## 2024-10-18 VITALS
HEIGHT: 68 IN | HEART RATE: 73 BPM | BODY MASS INDEX: 33.19 KG/M2 | SYSTOLIC BLOOD PRESSURE: 153 MMHG | OXYGEN SATURATION: 98 % | WEIGHT: 219 LBS | DIASTOLIC BLOOD PRESSURE: 93 MMHG

## 2024-10-18 DIAGNOSIS — J45.20 MILD INTERMITTENT ASTHMA, UNCOMPLICATED: ICD-10-CM

## 2024-10-18 DIAGNOSIS — R03.0 ELEVATED BLOOD-PRESSURE READING, W/OUT DIAGNOSIS OF HYPERTENSION: ICD-10-CM

## 2024-10-18 PROCEDURE — 99214 OFFICE O/P EST MOD 30 MIN: CPT

## 2024-10-18 RX ORDER — ALBUTEROL SULFATE 90 UG/1
108 (90 BASE) INHALANT RESPIRATORY (INHALATION)
Qty: 1 | Refills: 2 | Status: ACTIVE | COMMUNITY
Start: 2024-10-18 | End: 1900-01-01

## 2024-10-20 PROBLEM — R03.0 ELEVATED BLOOD PRESSURE READING: Status: ACTIVE | Noted: 2024-10-20

## 2024-12-04 ENCOUNTER — OUTPATIENT (OUTPATIENT)
Dept: OUTPATIENT SERVICES | Facility: HOSPITAL | Age: 39
LOS: 1 days | Discharge: HOME | End: 2024-12-04

## 2024-12-04 DIAGNOSIS — Z98.890 OTHER SPECIFIED POSTPROCEDURAL STATES: Chronic | ICD-10-CM

## 2024-12-04 DIAGNOSIS — D61.9 APLASTIC ANEMIA, UNSPECIFIED: ICD-10-CM

## 2024-12-12 ENCOUNTER — RESULT REVIEW (OUTPATIENT)
Age: 39
End: 2024-12-12

## 2024-12-12 ENCOUNTER — APPOINTMENT (OUTPATIENT)
Dept: HEMATOLOGY ONCOLOGY | Facility: CLINIC | Age: 39
End: 2024-12-12
Payer: COMMERCIAL

## 2024-12-12 ENCOUNTER — APPOINTMENT (OUTPATIENT)
Dept: INFUSION THERAPY | Facility: HOSPITAL | Age: 39
End: 2024-12-12

## 2024-12-12 VITALS
SYSTOLIC BLOOD PRESSURE: 143 MMHG | OXYGEN SATURATION: 98 % | DIASTOLIC BLOOD PRESSURE: 84 MMHG | BODY MASS INDEX: 33.58 KG/M2 | TEMPERATURE: 97.7 F | WEIGHT: 220.86 LBS | HEART RATE: 71 BPM | RESPIRATION RATE: 16 BRPM

## 2024-12-12 DIAGNOSIS — J45.20 MILD INTERMITTENT ASTHMA, UNCOMPLICATED: ICD-10-CM

## 2024-12-12 DIAGNOSIS — D61.9 APLASTIC ANEMIA, UNSPECIFIED: ICD-10-CM

## 2024-12-12 DIAGNOSIS — Z94.81 BONE MARROW TRANSPLANT STATUS: ICD-10-CM

## 2024-12-12 LAB
ALBUMIN SERPL ELPH-MCNC: 4.8 G/DL
ALP BLD-CCNC: 95 U/L
ALT SERPL-CCNC: 84 U/L
ANION GAP SERPL CALC-SCNC: 10 MMOL/L
AST SERPL-CCNC: 35 U/L
BASOPHILS # BLD AUTO: 0.03 K/UL — SIGNIFICANT CHANGE UP (ref 0–0.2)
BASOPHILS NFR BLD AUTO: 0.5 % — SIGNIFICANT CHANGE UP (ref 0–2)
BILIRUB SERPL-MCNC: 0.5 MG/DL
BUN SERPL-MCNC: 16 MG/DL
CALCIUM SERPL-MCNC: 10.1 MG/DL
CHLORIDE SERPL-SCNC: 101 MMOL/L
CO2 SERPL-SCNC: 27 MMOL/L
CREAT SERPL-MCNC: 0.94 MG/DL
EGFR: 107 ML/MIN/1.73M2
EOSINOPHIL # BLD AUTO: 0.06 K/UL — SIGNIFICANT CHANGE UP (ref 0–0.5)
EOSINOPHIL NFR BLD AUTO: 1 % — SIGNIFICANT CHANGE UP (ref 0–6)
GLUCOSE SERPL-MCNC: 104 MG/DL
HCT VFR BLD CALC: 43 % — SIGNIFICANT CHANGE UP (ref 39–50)
HGB BLD-MCNC: 16 G/DL — SIGNIFICANT CHANGE UP (ref 13–17)
IMM GRANULOCYTES NFR BLD AUTO: 0.2 % — SIGNIFICANT CHANGE UP (ref 0–0.9)
LDH SERPL-CCNC: 202 U/L
LYMPHOCYTES # BLD AUTO: 2.55 K/UL — SIGNIFICANT CHANGE UP (ref 1–3.3)
LYMPHOCYTES # BLD AUTO: 41.8 % — SIGNIFICANT CHANGE UP (ref 13–44)
MAGNESIUM SERPL-MCNC: 2.3 MG/DL
MCHC RBC-ENTMCNC: 33.8 PG — SIGNIFICANT CHANGE UP (ref 27–34)
MCHC RBC-ENTMCNC: 37.2 G/DL — HIGH (ref 32–36)
MCV RBC AUTO: 90.9 FL — SIGNIFICANT CHANGE UP (ref 80–100)
MONOCYTES # BLD AUTO: 0.72 K/UL — SIGNIFICANT CHANGE UP (ref 0–0.9)
MONOCYTES NFR BLD AUTO: 11.8 % — SIGNIFICANT CHANGE UP (ref 2–14)
NEUTROPHILS # BLD AUTO: 2.73 K/UL — SIGNIFICANT CHANGE UP (ref 1.8–7.4)
NEUTROPHILS NFR BLD AUTO: 44.7 % — SIGNIFICANT CHANGE UP (ref 43–77)
NRBC # BLD: 0 /100 WBCS — SIGNIFICANT CHANGE UP (ref 0–0)
NRBC BLD-RTO: 0 /100 WBCS — SIGNIFICANT CHANGE UP (ref 0–0)
PLATELET # BLD AUTO: 237 K/UL — SIGNIFICANT CHANGE UP (ref 150–400)
POTASSIUM SERPL-SCNC: 4.5 MMOL/L
PROT SERPL-MCNC: 7.3 G/DL
RBC # BLD: 4.73 M/UL — SIGNIFICANT CHANGE UP (ref 4.2–5.8)
RBC # FLD: 11.8 % — SIGNIFICANT CHANGE UP (ref 10.3–14.5)
SODIUM SERPL-SCNC: 138 MMOL/L
WBC # BLD: 6.1 K/UL — SIGNIFICANT CHANGE UP (ref 3.8–10.5)
WBC # FLD AUTO: 6.1 K/UL — SIGNIFICANT CHANGE UP (ref 3.8–10.5)

## 2024-12-12 PROCEDURE — 99215 OFFICE O/P EST HI 40 MIN: CPT

## 2024-12-12 RX ORDER — PENICILLIN V POTASSIUM 500 MG/1
500 TABLET, FILM COATED ORAL
Qty: 60 | Refills: 4 | Status: ACTIVE | COMMUNITY
Start: 2024-12-12 | End: 1900-01-01

## 2024-12-13 DIAGNOSIS — Z23 ENCOUNTER FOR IMMUNIZATION: ICD-10-CM

## 2024-12-13 LAB
CMV DNA SPEC QL NAA+PROBE: NOT DETECTED IU/ML
CMVPCR LOG: NOT DETECTED LOG10IU/ML

## 2024-12-19 ENCOUNTER — APPOINTMENT (OUTPATIENT)
Dept: INFUSION THERAPY | Facility: HOSPITAL | Age: 39
End: 2024-12-19

## 2024-12-30 RX ORDER — AZITHROMYCIN 250 MG/1
250 TABLET, FILM COATED ORAL
Qty: 1 | Refills: 0 | Status: ACTIVE | COMMUNITY
Start: 2024-12-30 | End: 1900-01-01

## 2025-01-16 ENCOUNTER — APPOINTMENT (OUTPATIENT)
Dept: INFUSION THERAPY | Facility: HOSPITAL | Age: 40
End: 2025-01-16

## 2025-01-20 ENCOUNTER — APPOINTMENT (OUTPATIENT)
Dept: INFUSION THERAPY | Facility: HOSPITAL | Age: 40
End: 2025-01-20

## 2025-01-21 ENCOUNTER — APPOINTMENT (OUTPATIENT)
Dept: INTERNAL MEDICINE | Facility: CLINIC | Age: 40
End: 2025-01-21
Payer: COMMERCIAL

## 2025-01-21 VITALS
DIASTOLIC BLOOD PRESSURE: 74 MMHG | WEIGHT: 223 LBS | BODY MASS INDEX: 33.8 KG/M2 | HEART RATE: 90 BPM | TEMPERATURE: 97.9 F | HEIGHT: 68 IN | OXYGEN SATURATION: 98 % | SYSTOLIC BLOOD PRESSURE: 124 MMHG

## 2025-01-21 DIAGNOSIS — J45.20 MILD INTERMITTENT ASTHMA, UNCOMPLICATED: ICD-10-CM

## 2025-01-21 DIAGNOSIS — R05.3 CHRONIC COUGH: ICD-10-CM

## 2025-01-21 PROCEDURE — 99212 OFFICE O/P EST SF 10 MIN: CPT

## 2025-01-30 RX ORDER — BUDESONIDE AND FORMOTEROL FUMARATE DIHYDRATE 80; 4.5 UG/1; UG/1
80-4.5 AEROSOL RESPIRATORY (INHALATION)
Qty: 1 | Refills: 0 | Status: ACTIVE | COMMUNITY
Start: 2025-01-21

## 2025-01-31 RX ORDER — ACYCLOVIR 400 MG/1
400 TABLET ORAL
Qty: 60 | Refills: 3 | Status: ACTIVE | COMMUNITY
Start: 2025-01-31 | End: 1900-01-01

## 2025-02-11 ENCOUNTER — OUTPATIENT (OUTPATIENT)
Dept: OUTPATIENT SERVICES | Facility: HOSPITAL | Age: 40
LOS: 1 days | Discharge: HOME | End: 2025-02-11

## 2025-02-11 DIAGNOSIS — Z98.890 OTHER SPECIFIED POSTPROCEDURAL STATES: Chronic | ICD-10-CM

## 2025-02-11 DIAGNOSIS — D61.9 APLASTIC ANEMIA, UNSPECIFIED: ICD-10-CM

## 2025-02-13 ENCOUNTER — APPOINTMENT (OUTPATIENT)
Dept: INFUSION THERAPY | Facility: HOSPITAL | Age: 40
End: 2025-02-13

## 2025-03-13 ENCOUNTER — RESULT REVIEW (OUTPATIENT)
Age: 40
End: 2025-03-13

## 2025-03-13 ENCOUNTER — APPOINTMENT (OUTPATIENT)
Dept: HEMATOLOGY ONCOLOGY | Facility: CLINIC | Age: 40
End: 2025-03-13
Payer: COMMERCIAL

## 2025-03-13 ENCOUNTER — APPOINTMENT (OUTPATIENT)
Dept: INFUSION THERAPY | Facility: HOSPITAL | Age: 40
End: 2025-03-13

## 2025-03-13 ENCOUNTER — NON-APPOINTMENT (OUTPATIENT)
Age: 40
End: 2025-03-13

## 2025-03-13 ENCOUNTER — MED ADMIN CHARGE (OUTPATIENT)
Age: 40
End: 2025-03-13

## 2025-03-13 VITALS
TEMPERATURE: 98.5 F | HEART RATE: 79 BPM | RESPIRATION RATE: 16 BRPM | BODY MASS INDEX: 33.76 KG/M2 | WEIGHT: 221.98 LBS | SYSTOLIC BLOOD PRESSURE: 150 MMHG | OXYGEN SATURATION: 99 % | DIASTOLIC BLOOD PRESSURE: 90 MMHG

## 2025-03-13 DIAGNOSIS — Z94.81 BONE MARROW TRANSPLANT STATUS: ICD-10-CM

## 2025-03-13 DIAGNOSIS — J45.20 MILD INTERMITTENT ASTHMA, UNCOMPLICATED: ICD-10-CM

## 2025-03-13 DIAGNOSIS — D61.9 APLASTIC ANEMIA, UNSPECIFIED: ICD-10-CM

## 2025-03-13 LAB
ALBUMIN SERPL ELPH-MCNC: 4.7 G/DL
ALP BLD-CCNC: 95 U/L
ALT SERPL-CCNC: 81 U/L
ANION GAP SERPL CALC-SCNC: 9 MMOL/L
AST SERPL-CCNC: 35 U/L
BASOPHILS # BLD AUTO: 0.02 K/UL — SIGNIFICANT CHANGE UP (ref 0–0.2)
BASOPHILS NFR BLD AUTO: 0.4 % — SIGNIFICANT CHANGE UP (ref 0–2)
BILIRUB SERPL-MCNC: 0.4 MG/DL
BUN SERPL-MCNC: 9 MG/DL
CALCIUM SERPL-MCNC: 9.7 MG/DL
CHLORIDE SERPL-SCNC: 105 MMOL/L
CO2 SERPL-SCNC: 29 MMOL/L
CREAT SERPL-MCNC: 0.89 MG/DL
EGFRCR SERPLBLD CKD-EPI 2021: 113 ML/MIN/1.73M2
EOSINOPHIL # BLD AUTO: 0.06 K/UL — SIGNIFICANT CHANGE UP (ref 0–0.5)
EOSINOPHIL NFR BLD AUTO: 1.3 % — SIGNIFICANT CHANGE UP (ref 0–6)
GLUCOSE SERPL-MCNC: 119 MG/DL
HCT VFR BLD CALC: 44.4 % — SIGNIFICANT CHANGE UP (ref 39–50)
HGB BLD-MCNC: 16.4 G/DL — SIGNIFICANT CHANGE UP (ref 13–17)
IMM GRANULOCYTES NFR BLD AUTO: 0.4 % — SIGNIFICANT CHANGE UP (ref 0–0.9)
LDH SERPL-CCNC: 202 U/L
LYMPHOCYTES # BLD AUTO: 2.03 K/UL — SIGNIFICANT CHANGE UP (ref 1–3.3)
LYMPHOCYTES # BLD AUTO: 42.6 % — SIGNIFICANT CHANGE UP (ref 13–44)
MAGNESIUM SERPL-MCNC: 2.3 MG/DL
MCHC RBC-ENTMCNC: 33.7 PG — SIGNIFICANT CHANGE UP (ref 27–34)
MCHC RBC-ENTMCNC: 36.9 G/DL — HIGH (ref 32–36)
MCV RBC AUTO: 91.2 FL — SIGNIFICANT CHANGE UP (ref 80–100)
MONOCYTES # BLD AUTO: 0.46 K/UL — SIGNIFICANT CHANGE UP (ref 0–0.9)
MONOCYTES NFR BLD AUTO: 9.7 % — SIGNIFICANT CHANGE UP (ref 2–14)
NEUTROPHILS # BLD AUTO: 2.17 K/UL — SIGNIFICANT CHANGE UP (ref 1.8–7.4)
NEUTROPHILS NFR BLD AUTO: 45.6 % — SIGNIFICANT CHANGE UP (ref 43–77)
NRBC BLD AUTO-RTO: 0 /100 WBCS — SIGNIFICANT CHANGE UP (ref 0–0)
PLATELET # BLD AUTO: 228 K/UL — SIGNIFICANT CHANGE UP (ref 150–400)
POTASSIUM SERPL-SCNC: 4.1 MMOL/L
PROT SERPL-MCNC: 7.8 G/DL
RBC # BLD: 4.87 M/UL — SIGNIFICANT CHANGE UP (ref 4.2–5.8)
RBC # FLD: 12.2 % — SIGNIFICANT CHANGE UP (ref 10.3–14.5)
SODIUM SERPL-SCNC: 143 MMOL/L
WBC # BLD: 4.76 K/UL — SIGNIFICANT CHANGE UP (ref 3.8–10.5)
WBC # FLD AUTO: 4.76 K/UL — SIGNIFICANT CHANGE UP (ref 3.8–10.5)

## 2025-03-13 PROCEDURE — 90677 PCV20 VACCINE IM: CPT

## 2025-03-13 PROCEDURE — 90698 DTAP-IPV/HIB VACCINE IM: CPT

## 2025-03-13 PROCEDURE — 99215 OFFICE O/P EST HI 40 MIN: CPT

## 2025-03-13 PROCEDURE — 90734 MENACWYD/MENACWYCRM VACC IM: CPT

## 2025-03-13 PROCEDURE — G0009: CPT

## 2025-03-13 PROCEDURE — 90750 HZV VACC RECOMBINANT IM: CPT

## 2025-03-13 PROCEDURE — 90472 IMMUNIZATION ADMIN EACH ADD: CPT

## 2025-03-14 LAB
CMV DNA SPEC QL NAA+PROBE: NOT DETECTED IU/ML
CMVPCR LOG: NOT DETECTED LOG10IU/ML

## 2025-04-07 ENCOUNTER — APPOINTMENT (OUTPATIENT)
Dept: OPHTHALMOLOGY | Facility: CLINIC | Age: 40
End: 2025-04-07
Payer: COMMERCIAL

## 2025-04-07 ENCOUNTER — NON-APPOINTMENT (OUTPATIENT)
Age: 40
End: 2025-04-07

## 2025-04-07 PROCEDURE — 92012 INTRM OPH EXAM EST PATIENT: CPT

## 2025-05-07 ENCOUNTER — OUTPATIENT (OUTPATIENT)
Dept: OUTPATIENT SERVICES | Facility: HOSPITAL | Age: 40
LOS: 1 days | Discharge: HOME | End: 2025-05-07

## 2025-05-07 DIAGNOSIS — D61.9 APLASTIC ANEMIA, UNSPECIFIED: ICD-10-CM

## 2025-05-07 DIAGNOSIS — Z98.890 OTHER SPECIFIED POSTPROCEDURAL STATES: Chronic | ICD-10-CM

## 2025-05-08 ENCOUNTER — APPOINTMENT (OUTPATIENT)
Dept: HEMATOLOGY ONCOLOGY | Facility: CLINIC | Age: 40
End: 2025-05-08
Payer: COMMERCIAL

## 2025-05-08 ENCOUNTER — RESULT REVIEW (OUTPATIENT)
Age: 40
End: 2025-05-08

## 2025-05-08 VITALS
OXYGEN SATURATION: 99 % | TEMPERATURE: 98.7 F | RESPIRATION RATE: 16 BRPM | BODY MASS INDEX: 33.87 KG/M2 | HEIGHT: 67.64 IN | HEART RATE: 74 BPM | SYSTOLIC BLOOD PRESSURE: 125 MMHG | WEIGHT: 220.9 LBS | DIASTOLIC BLOOD PRESSURE: 83 MMHG

## 2025-05-08 DIAGNOSIS — D61.9 APLASTIC ANEMIA, UNSPECIFIED: ICD-10-CM

## 2025-05-08 DIAGNOSIS — Z94.81 BONE MARROW TRANSPLANT STATUS: ICD-10-CM

## 2025-05-08 LAB
ALBUMIN SERPL ELPH-MCNC: 4.9 G/DL
ALP BLD-CCNC: 109 U/L
ALT SERPL-CCNC: 66 U/L
ANION GAP SERPL CALC-SCNC: 11 MMOL/L
AST SERPL-CCNC: 32 U/L
BASOPHILS # BLD AUTO: 0.02 K/UL — SIGNIFICANT CHANGE UP (ref 0–0.2)
BASOPHILS NFR BLD AUTO: 0.4 % — SIGNIFICANT CHANGE UP (ref 0–2)
BILIRUB SERPL-MCNC: 0.5 MG/DL
BUN SERPL-MCNC: 12 MG/DL
CALCIUM SERPL-MCNC: 9.6 MG/DL
CHLORIDE SERPL-SCNC: 106 MMOL/L
CO2 SERPL-SCNC: 26 MMOL/L
CREAT SERPL-MCNC: 0.95 MG/DL
EGFRCR SERPLBLD CKD-EPI 2021: 106 ML/MIN/1.73M2
EOSINOPHIL # BLD AUTO: 0.07 K/UL — SIGNIFICANT CHANGE UP (ref 0–0.5)
EOSINOPHIL NFR BLD AUTO: 1.4 % — SIGNIFICANT CHANGE UP (ref 0–6)
GLUCOSE SERPL-MCNC: 101 MG/DL
HCT VFR BLD CALC: 43.1 % — SIGNIFICANT CHANGE UP (ref 39–50)
HGB BLD-MCNC: 15.6 G/DL — SIGNIFICANT CHANGE UP (ref 13–17)
IMM GRANULOCYTES NFR BLD AUTO: 0.2 % — SIGNIFICANT CHANGE UP (ref 0–0.9)
LDH SERPL-CCNC: 215 U/L
LYMPHOCYTES # BLD AUTO: 2 K/UL — SIGNIFICANT CHANGE UP (ref 1–3.3)
LYMPHOCYTES # BLD AUTO: 40.4 % — SIGNIFICANT CHANGE UP (ref 13–44)
MCHC RBC-ENTMCNC: 33.3 PG — SIGNIFICANT CHANGE UP (ref 27–34)
MCHC RBC-ENTMCNC: 36.2 G/DL — HIGH (ref 32–36)
MCV RBC AUTO: 92.1 FL — SIGNIFICANT CHANGE UP (ref 80–100)
MONOCYTES # BLD AUTO: 0.45 K/UL — SIGNIFICANT CHANGE UP (ref 0–0.9)
MONOCYTES NFR BLD AUTO: 9.1 % — SIGNIFICANT CHANGE UP (ref 2–14)
NEUTROPHILS # BLD AUTO: 2.4 K/UL — SIGNIFICANT CHANGE UP (ref 1.8–7.4)
NEUTROPHILS NFR BLD AUTO: 48.5 % — SIGNIFICANT CHANGE UP (ref 43–77)
NRBC BLD AUTO-RTO: 0 /100 WBCS — SIGNIFICANT CHANGE UP (ref 0–0)
PLATELET # BLD AUTO: 217 K/UL — SIGNIFICANT CHANGE UP (ref 150–400)
POTASSIUM SERPL-SCNC: 4.7 MMOL/L
PROT SERPL-MCNC: 7.8 G/DL
RBC # BLD: 4.68 M/UL — SIGNIFICANT CHANGE UP (ref 4.2–5.8)
RBC # FLD: 11.9 % — SIGNIFICANT CHANGE UP (ref 10.3–14.5)
SODIUM SERPL-SCNC: 142 MMOL/L
WBC # BLD: 4.95 K/UL — SIGNIFICANT CHANGE UP (ref 3.8–10.5)
WBC # FLD AUTO: 4.95 K/UL — SIGNIFICANT CHANGE UP (ref 3.8–10.5)

## 2025-05-08 PROCEDURE — ZZZZZ: CPT

## 2025-05-08 PROCEDURE — 99214 OFFICE O/P EST MOD 30 MIN: CPT

## 2025-06-08 NOTE — ED ADULT TRIAGE NOTE - NSTRIAGECARE_GEN_A_ER
30-year-old male with a past medical history of asthma, s/p appendectomy (no rupture),daily marijuana use, and prior ethanol abuse presented to the ED with two days of nausea and vomiting in the setting of active marijuana use and recent sepsis from colitis still showing possible thickness vs underdistension of tranverse colon.       #N/V/abd pain with h/o of recent colitis and active marijuana use  #h/o of appendectomy  #ETOH abuse history  - WBC downtrending with improvement in left shift and normalization of lactate  - On CT A/P : Transverse colon mild wall thickening may be due to mild colitis versus   underdistention. No bowel obstruction.  - afebrile  - not requiring antiemetics   - alcohol level <10  -EKG showed Line Normal sinus rhythm with sinus arrhythmia  - continue cipro flagyl given leukocytosis; unlikely reactive given no emesis since admission  -s/p 2L LR ,famotidine 20 mg IV , halodo5 mg , Toradol 15 mg and Zofran 4   - can stop IVF as he is eating  - GI consult appreciated  - RUQ US for biliary colic  - appendectomy done prior to any appendiceal rupture as per patient so no sibo or gastroparesis  - CTAP does show thickening towards the pylorus of the stomach so could possibly evaluate with an EGD if no improvement  -f/u CATCH team   -f/u Addiction team c/s   - c/w home thiamine and folic acid  - patient needs to try solid food before he can go home      #Transaminitis, resolved  - AST 36, ALT 46( trending down )   - steatosis of liver noted on CTAP in the setting of alcohol use  - abstinence, CATCH team    #Asthma - not in exacerbation  - c/w home albuterol PRN      #DVT ppx: Lovenox SubQ  #GI ppx: n/a  #Diet: advance as tolerate - regular  #Activity: AAT  #Code Status: Full Code    Handoff: RUQ US as per GI, correct electrolytes, needs to tolerate solids before going home
Face Mask

## 2025-06-19 ENCOUNTER — APPOINTMENT (OUTPATIENT)
Dept: HEMATOLOGY ONCOLOGY | Facility: CLINIC | Age: 40
End: 2025-06-19

## 2025-06-19 VITALS
WEIGHT: 220 LBS | HEART RATE: 79 BPM | TEMPERATURE: 98.7 F | OXYGEN SATURATION: 99 % | RESPIRATION RATE: 16 BRPM | DIASTOLIC BLOOD PRESSURE: 92 MMHG | BODY MASS INDEX: 33.81 KG/M2 | SYSTOLIC BLOOD PRESSURE: 145 MMHG

## 2025-06-19 PROCEDURE — ZZZZZ: CPT

## 2025-08-07 ENCOUNTER — APPOINTMENT (OUTPATIENT)
Dept: HEMATOLOGY ONCOLOGY | Facility: CLINIC | Age: 40
End: 2025-08-07

## 2025-08-07 ENCOUNTER — APPOINTMENT (OUTPATIENT)
Dept: HEMATOLOGY ONCOLOGY | Facility: CLINIC | Age: 40
End: 2025-08-07
Payer: COMMERCIAL

## 2025-08-07 ENCOUNTER — RESULT REVIEW (OUTPATIENT)
Age: 40
End: 2025-08-07

## 2025-08-07 VITALS
HEIGHT: 68.5 IN | HEART RATE: 88 BPM | OXYGEN SATURATION: 97 % | SYSTOLIC BLOOD PRESSURE: 154 MMHG | RESPIRATION RATE: 16 BRPM | BODY MASS INDEX: 33.62 KG/M2 | TEMPERATURE: 97.2 F | WEIGHT: 224.43 LBS | DIASTOLIC BLOOD PRESSURE: 89 MMHG

## 2025-08-07 DIAGNOSIS — Z94.81 BONE MARROW TRANSPLANT STATUS: ICD-10-CM

## 2025-08-07 DIAGNOSIS — D61.9 APLASTIC ANEMIA, UNSPECIFIED: ICD-10-CM

## 2025-08-07 LAB
ALBUMIN SERPL ELPH-MCNC: 5 G/DL
ALP BLD-CCNC: 110 U/L
ALT SERPL-CCNC: 101 U/L
ANION GAP SERPL CALC-SCNC: 13 MMOL/L
AST SERPL-CCNC: 60 U/L
BILIRUB SERPL-MCNC: 0.6 MG/DL
BUN SERPL-MCNC: 13 MG/DL
CALCIUM SERPL-MCNC: 9.9 MG/DL
CHLORIDE SERPL-SCNC: 103 MMOL/L
CO2 SERPL-SCNC: 25 MMOL/L
CREAT SERPL-MCNC: 1.01 MG/DL
EGFRCR SERPLBLD CKD-EPI 2021: 98 ML/MIN/1.73M2
GLUCOSE SERPL-MCNC: 112 MG/DL
LDH SERPL-CCNC: 242 U/L
MAGNESIUM SERPL-MCNC: 2.5 MG/DL
POTASSIUM SERPL-SCNC: 4.8 MMOL/L
PROT SERPL-MCNC: 7.6 G/DL
SODIUM SERPL-SCNC: 141 MMOL/L

## 2025-08-07 PROCEDURE — 99214 OFFICE O/P EST MOD 30 MIN: CPT

## 2025-09-18 ENCOUNTER — MED ADMIN CHARGE (OUTPATIENT)
Age: 40
End: 2025-09-18

## 2025-09-18 ENCOUNTER — APPOINTMENT (OUTPATIENT)
Dept: HEMATOLOGY ONCOLOGY | Facility: CLINIC | Age: 40
End: 2025-09-18

## 2025-09-18 VITALS
RESPIRATION RATE: 16 BRPM | TEMPERATURE: 97.2 F | BODY MASS INDEX: 33.59 KG/M2 | DIASTOLIC BLOOD PRESSURE: 76 MMHG | HEART RATE: 84 BPM | WEIGHT: 224.21 LBS | SYSTOLIC BLOOD PRESSURE: 152 MMHG | OXYGEN SATURATION: 98 %

## 2025-09-18 PROCEDURE — 90684 PCV21 VACCINE IM: CPT

## 2025-09-18 PROCEDURE — G0009: CPT
